# Patient Record
Sex: FEMALE | Race: BLACK OR AFRICAN AMERICAN | NOT HISPANIC OR LATINO | Employment: OTHER | URBAN - METROPOLITAN AREA
[De-identification: names, ages, dates, MRNs, and addresses within clinical notes are randomized per-mention and may not be internally consistent; named-entity substitution may affect disease eponyms.]

---

## 2017-01-04 ENCOUNTER — HOSPITAL ENCOUNTER (OUTPATIENT)
Dept: CARDIOLOGY | Facility: HOSPITAL | Age: 82
Setting detail: RECURRING SERIES
Discharge: HOME | End: 2017-01-04

## 2017-01-04 PROCEDURE — 85610 PROTHROMBIN TIME: CPT | Performed by: INTERNAL MEDICINE

## 2017-01-04 PROCEDURE — 36416 COLLJ CAPILLARY BLOOD SPEC: CPT | Performed by: INTERNAL MEDICINE

## 2017-01-12 ENCOUNTER — HOSPITAL ENCOUNTER (OUTPATIENT)
Dept: CARDIOLOGY | Facility: HOSPITAL | Age: 82
Setting detail: RECURRING SERIES
Discharge: HOME | End: 2017-01-12

## 2017-01-12 PROCEDURE — 85610 PROTHROMBIN TIME: CPT | Performed by: INTERNAL MEDICINE

## 2017-01-12 PROCEDURE — 36416 COLLJ CAPILLARY BLOOD SPEC: CPT | Performed by: INTERNAL MEDICINE

## 2017-01-17 ENCOUNTER — HOSPITAL ENCOUNTER (OUTPATIENT)
Dept: MAMMOGRAPHY | Facility: HOSPITAL | Age: 82
Discharge: HOME | End: 2017-01-17
Admitting: FAMILY MEDICINE

## 2017-01-17 DIAGNOSIS — Z13.9 SCREENING: ICD-10-CM

## 2017-01-17 PROCEDURE — G0202 SCR MAMMO BI INCL CAD: HCPCS

## 2017-01-26 ENCOUNTER — HOSPITAL ENCOUNTER (OUTPATIENT)
Dept: CARDIOLOGY | Facility: HOSPITAL | Age: 82
Setting detail: RECURRING SERIES
Discharge: HOME | End: 2017-01-26

## 2017-01-26 PROCEDURE — 36416 COLLJ CAPILLARY BLOOD SPEC: CPT | Performed by: INTERNAL MEDICINE

## 2017-01-26 PROCEDURE — 85610 PROTHROMBIN TIME: CPT | Performed by: INTERNAL MEDICINE

## 2017-02-23 ENCOUNTER — HOSPITAL ENCOUNTER (OUTPATIENT)
Dept: CARDIOLOGY | Facility: HOSPITAL | Age: 82
Setting detail: RECURRING SERIES
Discharge: HOME | End: 2017-02-23

## 2017-02-23 PROCEDURE — 85610 PROTHROMBIN TIME: CPT

## 2017-02-23 PROCEDURE — 36416 COLLJ CAPILLARY BLOOD SPEC: CPT

## 2017-03-06 ENCOUNTER — TELEPHONE (OUTPATIENT)
Dept: FAMILY MEDICINE CLINIC | Facility: CLINIC | Age: 82
End: 2017-03-06

## 2017-03-08 ENCOUNTER — LAB (OUTPATIENT)
Dept: LAB | Facility: HOSPITAL | Age: 82
End: 2017-03-08

## 2017-03-08 DIAGNOSIS — D72.819 LEUKOPENIA: ICD-10-CM

## 2017-03-08 DIAGNOSIS — D47.2 MGUS (MONOCLONAL GAMMOPATHY OF UNKNOWN SIGNIFICANCE): ICD-10-CM

## 2017-03-08 LAB
ALBUMIN SERPL-MCNC: 3.9 G/DL (ref 3.5–5.2)
ALBUMIN/GLOB SERPL: 1.1 G/DL (ref 1.1–2.4)
ALP SERPL-CCNC: 64 U/L (ref 38–116)
ALT SERPL W P-5'-P-CCNC: <5 U/L (ref 0–33)
ANION GAP SERPL CALCULATED.3IONS-SCNC: 10 MMOL/L
AST SERPL-CCNC: 16 U/L (ref 0–32)
BASOPHILS # BLD AUTO: 0.02 10*3/MM3 (ref 0–0.1)
BASOPHILS NFR BLD AUTO: 0.9 % (ref 0–1.1)
BILIRUB SERPL-MCNC: 1.6 MG/DL (ref 0.1–1.2)
BUN BLD-MCNC: 14 MG/DL (ref 6–20)
BUN/CREAT SERPL: 16.9 (ref 7.3–30)
CALCIUM SPEC-SCNC: 10 MG/DL (ref 8.5–10.2)
CHLORIDE SERPL-SCNC: 103 MMOL/L (ref 98–107)
CO2 SERPL-SCNC: 29 MMOL/L (ref 22–29)
CREAT BLD-MCNC: 0.83 MG/DL (ref 0.6–1.1)
DEPRECATED RDW RBC AUTO: 51.7 FL (ref 37–49)
EOSINOPHIL # BLD AUTO: 0 10*3/MM3 (ref 0–0.36)
EOSINOPHIL NFR BLD AUTO: 0 % (ref 1–5)
ERYTHROCYTE [DISTWIDTH] IN BLOOD BY AUTOMATED COUNT: 14.6 % (ref 11.7–14.5)
GFR SERPL CREATININE-BSD FRML MDRD: 80 ML/MIN/1.73
GLOBULIN UR ELPH-MCNC: 3.5 GM/DL (ref 1.8–3.5)
GLUCOSE BLD-MCNC: 95 MG/DL (ref 74–124)
HCT VFR BLD AUTO: 36.9 % (ref 34–45)
HGB BLD-MCNC: 11.8 G/DL (ref 11.5–14.9)
IMM GRANULOCYTES # BLD: 0 10*3/MM3 (ref 0–0.03)
IMM GRANULOCYTES NFR BLD: 0 % (ref 0–0.5)
LYMPHOCYTES # BLD AUTO: 1.23 10*3/MM3 (ref 1–3.5)
LYMPHOCYTES NFR BLD AUTO: 54.9 % (ref 20–49)
MCH RBC QN AUTO: 30.9 PG (ref 27–33)
MCHC RBC AUTO-ENTMCNC: 32 G/DL (ref 32–35)
MCV RBC AUTO: 96.6 FL (ref 83–97)
MONOCYTES # BLD AUTO: 0.21 10*3/MM3 (ref 0.25–0.8)
MONOCYTES NFR BLD AUTO: 9.4 % (ref 4–12)
NEUTROPHILS # BLD AUTO: 0.78 10*3/MM3 (ref 1.5–7)
NEUTROPHILS NFR BLD AUTO: 34.8 % (ref 39–75)
NRBC BLD MANUAL-RTO: 0 /100 WBC (ref 0–0)
PLATELET # BLD AUTO: 158 10*3/MM3 (ref 150–375)
PMV BLD AUTO: 9.2 FL (ref 8.9–12.1)
POTASSIUM BLD-SCNC: 3.7 MMOL/L (ref 3.5–4.7)
PROT SERPL-MCNC: 7.4 G/DL (ref 6.3–8)
RBC # BLD AUTO: 3.82 10*6/MM3 (ref 3.9–5)
SODIUM BLD-SCNC: 142 MMOL/L (ref 134–145)
WBC NRBC COR # BLD: 2.24 10*3/MM3 (ref 4–10)

## 2017-03-08 PROCEDURE — 80053 COMPREHEN METABOLIC PANEL: CPT | Performed by: INTERNAL MEDICINE

## 2017-03-08 PROCEDURE — 85025 COMPLETE CBC W/AUTO DIFF WBC: CPT | Performed by: INTERNAL MEDICINE

## 2017-03-08 PROCEDURE — 36415 COLL VENOUS BLD VENIPUNCTURE: CPT | Performed by: INTERNAL MEDICINE

## 2017-03-10 LAB
ALBUMIN SERPL-MCNC: 3.4 G/DL (ref 2.9–4.4)
ALBUMIN/GLOB SERPL: 0.9 {RATIO} (ref 0.7–1.7)
ALPHA1 GLOB FLD ELPH-MCNC: 0.3 G/DL (ref 0–0.4)
ALPHA2 GLOB SERPL ELPH-MCNC: 0.7 G/DL (ref 0.4–1)
B-GLOBULIN SERPL ELPH-MCNC: 1.5 G/DL (ref 0.7–1.3)
GAMMA GLOB SERPL ELPH-MCNC: 1.5 G/DL (ref 0.4–1.8)
GLOBULIN SER CALC-MCNC: 3.9 G/DL (ref 2.2–3.9)
IGA SERPL-MCNC: 719 MG/DL (ref 64–422)
IGG SERPL-MCNC: 1549 MG/DL (ref 700–1600)
IGM SERPL-MCNC: 63 MG/DL (ref 26–217)
INTERPRETATION SERPL IEP-IMP: ABNORMAL
KAPPA LC SERPL-MCNC: 42.85 MG/L (ref 3.3–19.4)
KAPPA LC/LAMBDA SER: 0.94 {RATIO} (ref 0.26–1.65)
LAMBDA LC FREE SERPL-MCNC: 45.77 MG/L (ref 5.71–26.3)
Lab: ABNORMAL
M-SPIKE: ABNORMAL G/DL
PROT SERPL-MCNC: 7.3 G/DL (ref 6–8.5)

## 2017-03-22 ENCOUNTER — OFFICE VISIT (OUTPATIENT)
Dept: ONCOLOGY | Facility: CLINIC | Age: 82
End: 2017-03-22

## 2017-03-22 ENCOUNTER — APPOINTMENT (OUTPATIENT)
Dept: LAB | Facility: HOSPITAL | Age: 82
End: 2017-03-22

## 2017-03-22 ENCOUNTER — HOSPITAL ENCOUNTER (OUTPATIENT)
Dept: CARDIOLOGY | Facility: HOSPITAL | Age: 82
Setting detail: RECURRING SERIES
Discharge: HOME | End: 2017-03-22

## 2017-03-22 VITALS
OXYGEN SATURATION: 99 % | BODY MASS INDEX: 36.49 KG/M2 | TEMPERATURE: 97.8 F | DIASTOLIC BLOOD PRESSURE: 94 MMHG | RESPIRATION RATE: 20 BRPM | HEART RATE: 63 BPM | HEIGHT: 68 IN | SYSTOLIC BLOOD PRESSURE: 146 MMHG | WEIGHT: 240.8 LBS

## 2017-03-22 DIAGNOSIS — D47.2 MGUS (MONOCLONAL GAMMOPATHY OF UNKNOWN SIGNIFICANCE): Primary | ICD-10-CM

## 2017-03-22 DIAGNOSIS — D70.8 OTHER NEUTROPENIA (HCC): ICD-10-CM

## 2017-03-22 PROCEDURE — 85610 PROTHROMBIN TIME: CPT

## 2017-03-22 PROCEDURE — 99213 OFFICE O/P EST LOW 20 MIN: CPT | Performed by: INTERNAL MEDICINE

## 2017-03-22 PROCEDURE — 36416 COLLJ CAPILLARY BLOOD SPEC: CPT

## 2017-03-22 NOTE — PROGRESS NOTES
Subjective     REASON FOR FOLLOW UP:   1. CHRONIC LEUKOPENIA/ Neutropenia  2. IgA Lambda MGUS  3. Ulcerative Colitis  4. Positive ALEC    HISTORY OF PRESENT ILLNESS:  The patient is a 81 y.o. year old female who is here for follow up of the above noted  issuse.  She had actually been evaluated by Dr. Choi in our practice in 2014 for leukopenia.  At that time Dr. Choi had sent a peripheral blood flow cytometry which did not show any abnormal populations of white cells.  Ms. Villeda reports that she is actually feeling well in general.  She's not had any recurring infections.  Her white count remains low but stable.  Her platelets and hemoglobin are unremarkable.    With her visit here 6 months ago, we performed additional lab studies that revealed an IgA lambda monoclonal gammopathy.  She had repeat serum protein studies performed last week on 3/8/2017 that showed her IgA level and monoclonal spike are stable.    She also was noted to have a positive ALEC and positive RNP antibodies.    History of Present Illness       Current Outpatient Prescriptions on File Prior to Visit   Medication Sig Dispense Refill   • Acetaminophen (PAIN RELIEVER PO) Take  by mouth.     • calcium carbonate (OS-ALIDA) 600 MG tablet Take  by mouth daily.     • furosemide (LASIX) 40 MG tablet TAKE 1 TABLET EVERY DAY AS DIRECTED 90 tablet 3   • gabapentin (NEURONTIN) 300 MG capsule TAKE 1 CAPSULE THREE TIMES DAILY 270 capsule 3   • mesalamine (LIALDA) 1.2 G EC tablet Take 4 tablets by mouth Daily for 360 days. 360 tablet 3   • omeprazole (priLOSEC) 20 MG capsule Take 1 capsule by mouth Daily. 90 capsule 3   • vitamin B-12 (CYANOCOBALAMIN) 1000 MCG tablet Take  by mouth daily.     • warfarin (COUMADIN) 5 MG tablet Take 1 tablet by mouth daily. 104 tablet 3     No current facility-administered medications on file prior to visit.         ALLERGIES:    Allergies   Allergen Reactions   • Amitriptyline    • Amoxicillin-Pot Clavulanate    • Aspirin   "  • Bactrim [Sulfamethoxazole-Trimethoprim]    • Iodinated Diagnostic Agents    • Latex    • Naproxen    • Nsaids    • Soma Compound With Codeine [Carisoprodol-Aspirin-Codeine]    • Sulfa Antibiotics           Review of Systems   Constitutional: Negative for activity change, appetite change, fatigue, fever and unexpected weight change.   HENT: Negative for hearing loss, nosebleeds, trouble swallowing and voice change.    Eyes: Negative for visual disturbance.   Respiratory: Negative for cough, shortness of breath and wheezing.    Cardiovascular: Negative for chest pain and palpitations.   Gastrointestinal: Negative for abdominal pain, nausea and vomiting.   Genitourinary: Negative for difficulty urinating, frequency, hematuria and urgency.   Musculoskeletal: Positive for arthralgias and gait problem. Negative for back pain and neck pain.   Skin: Negative for rash.   Neurological: Negative for dizziness, seizures, syncope and headaches.        Balance issues.   Hematological: Negative for adenopathy. Does not bruise/bleed easily.   Psychiatric/Behavioral: Negative for behavioral problems. The patient is not nervous/anxious.       Objective     Vitals:    03/22/17 1054   BP: 146/94   Resp: 20   Temp: 97.8 °F (36.6 °C)   TempSrc: Oral   Weight: 240 lb 12.8 oz (109 kg)   Height: 68\" (172.7 cm)   PainSc: 8  Comment: Bilat knee pain x8     Current Status 9/12/2016   ECOG score 1       Physical Exam   Constitutional: She is oriented to person, place, and time. She appears well-developed and well-nourished. No distress.   HENT:   Head: Normocephalic.   Eyes: Conjunctivae and EOM are normal. Pupils are equal, round, and reactive to light. No scleral icterus.   Neck: Normal range of motion. Neck supple. No JVD present. No thyromegaly present.   Cardiovascular: Normal rate and regular rhythm.  Exam reveals no gallop and no friction rub.    Murmur heard.  Pulmonary/Chest: Effort normal and breath sounds normal. She has no " wheezes. She has no rales.   Abdominal: Soft. She exhibits no distension and no mass. There is no tenderness.   Musculoskeletal: Normal range of motion. She exhibits no edema or deformity.   Lymphadenopathy:     She has no cervical adenopathy.   Neurological: She is alert and oriented to person, place, and time. She has normal reflexes. No cranial nerve deficit.   Ambulates with a cane.   Skin: Skin is warm and dry. No rash noted. No erythema.   Psychiatric: She has a normal mood and affect. Her behavior is normal. Judgment normal.         RECENT LABS:  Hematology WBC   Date Value Ref Range Status   03/08/2017 2.24 (L) 4.00 - 10.00 10*3/mm3 Final     RBC   Date Value Ref Range Status   03/08/2017 3.82 (L) 3.90 - 5.00 10*6/mm3 Final     Hemoglobin   Date Value Ref Range Status   03/08/2017 11.8 11.5 - 14.9 g/dL Final     Hematocrit   Date Value Ref Range Status   03/08/2017 36.9 34.0 - 45.0 % Final     Platelets   Date Value Ref Range Status   03/08/2017 158 150 - 375 10*3/mm3 Final          Assessment/Plan   1.  Chronic leukopenia/neutropenia without any recurring infections.  As noted above she been evaluated in our office in 2014 and at that time peripheral blood flow cytometry was normal.  I suspect this may be a combination of benign constitutional leukopenia, which is common among the  population with some contribution or worsening of leukopenia due to her Lialda for ulcerative colitis.  Her counts remain stable.  2.  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  Her IgA level and lambda light chains have been stable over the past 6 months.  We will continue to monitor this.  She currently does not have any signs of underlying myeloma or non-Hodgkin's lymphoma.  3.  Ulcerative colitis.  She continues to follow-up with her gastroenterologist Dr. English  4.  Positive ALEC on previous lab work.  It is possible she could also have some degree of autoimmune neutropenia.    Plan    1.  We  discussed the results of the lab studies from 3/8/2017 with Mrs. Villeda.  Since her MGUS and leukopenia are stable we will continue to observe this for now.  2.  She'll be scheduled to return in 6 months for M.D. follow-up and again will undergo labs one week prior to that visit including a CBC, serum chemistries, and serum protein studies.  3.  She is still reluctant to see a rheumatologist regarding her positive ALEC due to the high co-pay associated with specialty visits.  We did discuss however that if her arthralgias worsen that she may want to see a rheumatologist at that time.  We also discussed the possibility of a trial of prednisone therapy in the future if her symptoms worsen or if her counts worsen significantly over time.

## 2017-04-21 ENCOUNTER — HOSPITAL ENCOUNTER (OUTPATIENT)
Dept: CARDIOLOGY | Facility: HOSPITAL | Age: 82
Setting detail: RECURRING SERIES
Discharge: HOME | End: 2017-04-21

## 2017-04-21 PROCEDURE — 36416 COLLJ CAPILLARY BLOOD SPEC: CPT

## 2017-04-21 PROCEDURE — 85610 PROTHROMBIN TIME: CPT

## 2017-05-02 ENCOUNTER — OFFICE VISIT (OUTPATIENT)
Dept: FAMILY MEDICINE CLINIC | Facility: CLINIC | Age: 82
End: 2017-05-02

## 2017-05-02 VITALS
HEIGHT: 68 IN | OXYGEN SATURATION: 96 % | BODY MASS INDEX: 36.19 KG/M2 | HEART RATE: 81 BPM | SYSTOLIC BLOOD PRESSURE: 130 MMHG | DIASTOLIC BLOOD PRESSURE: 70 MMHG | WEIGHT: 238.8 LBS | TEMPERATURE: 98 F

## 2017-05-02 DIAGNOSIS — Z23 ENCOUNTER FOR IMMUNIZATION: ICD-10-CM

## 2017-05-02 DIAGNOSIS — Z00.00 HEALTH CARE MAINTENANCE: Primary | ICD-10-CM

## 2017-05-02 PROCEDURE — 90670 PCV13 VACCINE IM: CPT | Performed by: FAMILY MEDICINE

## 2017-05-02 PROCEDURE — G0009 ADMIN PNEUMOCOCCAL VACCINE: HCPCS | Performed by: FAMILY MEDICINE

## 2017-05-02 PROCEDURE — G0439 PPPS, SUBSEQ VISIT: HCPCS | Performed by: FAMILY MEDICINE

## 2017-05-02 RX ORDER — FUROSEMIDE 20 MG/1
TABLET ORAL
Qty: 135 TABLET | Refills: 3 | Status: SHIPPED | OUTPATIENT
Start: 2017-05-02 | End: 2017-08-31 | Stop reason: SDUPTHER

## 2017-05-02 RX ORDER — FUROSEMIDE 20 MG/1
20 TABLET ORAL DAILY
COMMUNITY
End: 2017-05-02 | Stop reason: SDUPTHER

## 2017-05-05 ENCOUNTER — TELEPHONE (OUTPATIENT)
Dept: FAMILY MEDICINE CLINIC | Facility: CLINIC | Age: 82
End: 2017-05-05

## 2017-05-16 ENCOUNTER — HOSPITAL ENCOUNTER (OUTPATIENT)
Dept: CARDIOLOGY | Facility: HOSPITAL | Age: 82
Setting detail: RECURRING SERIES
Discharge: HOME | End: 2017-05-16

## 2017-05-16 PROCEDURE — 85610 PROTHROMBIN TIME: CPT

## 2017-05-16 PROCEDURE — 36416 COLLJ CAPILLARY BLOOD SPEC: CPT

## 2017-05-22 ENCOUNTER — TELEPHONE (OUTPATIENT)
Dept: FAMILY MEDICINE CLINIC | Facility: CLINIC | Age: 82
End: 2017-05-22

## 2017-06-12 ENCOUNTER — TELEPHONE (OUTPATIENT)
Dept: GASTROENTEROLOGY | Facility: CLINIC | Age: 82
End: 2017-06-12

## 2017-06-12 RX ORDER — MESALAMINE 1.2 G/1
TABLET, DELAYED RELEASE ORAL
Qty: 360 TABLET | Refills: 1 | Status: SHIPPED | OUTPATIENT
Start: 2017-06-12 | End: 2017-11-01 | Stop reason: SDUPTHER

## 2017-06-12 NOTE — TELEPHONE ENCOUNTER
----- Message from Samantha Gonzalez sent at 6/12/2017  1:13 PM EDT -----  Regarding: PT CALLED - mesalamine (LIALDA) 1.2 G EC tablet REFILL  Contact: [phone]  DANIAL VELARDE SEND TO [facility]. PT ALMOST OUT OF MEDS.

## 2017-06-13 RX ORDER — FUROSEMIDE 40 MG/1
TABLET ORAL
Qty: 90 TABLET | Refills: 3 | Status: SHIPPED | OUTPATIENT
Start: 2017-06-13 | End: 2017-08-31

## 2017-06-14 ENCOUNTER — HOSPITAL ENCOUNTER (OUTPATIENT)
Dept: CARDIOLOGY | Facility: HOSPITAL | Age: 82
Setting detail: RECURRING SERIES
Discharge: HOME | End: 2017-06-14

## 2017-06-14 PROCEDURE — 85610 PROTHROMBIN TIME: CPT

## 2017-06-14 PROCEDURE — 36416 COLLJ CAPILLARY BLOOD SPEC: CPT

## 2017-07-05 ENCOUNTER — TELEPHONE (OUTPATIENT)
Dept: FAMILY MEDICINE CLINIC | Facility: CLINIC | Age: 82
End: 2017-07-05

## 2017-07-05 NOTE — TELEPHONE ENCOUNTER
Pt called wanting to know when her last bone density test was? Wanted to know if she is do for it? Last dexa scan that i see in allscripts is 12/18/2013

## 2017-07-06 DIAGNOSIS — Z78.0 POSTMENOPAUSAL: Primary | ICD-10-CM

## 2017-07-06 NOTE — TELEPHONE ENCOUNTER
Informed pt. Pt said that can we schedule that for her. Wants it done at [facility].

## 2017-07-12 ENCOUNTER — HOSPITAL ENCOUNTER (OUTPATIENT)
Dept: CARDIOLOGY | Facility: HOSPITAL | Age: 82
Setting detail: RECURRING SERIES
Discharge: HOME | End: 2017-07-12

## 2017-07-12 PROCEDURE — 85610 PROTHROMBIN TIME: CPT

## 2017-07-12 PROCEDURE — 36416 COLLJ CAPILLARY BLOOD SPEC: CPT

## 2017-07-18 ENCOUNTER — HOSPITAL ENCOUNTER (OUTPATIENT)
Dept: BONE DENSITY | Facility: HOSPITAL | Age: 82
Discharge: HOME | End: 2017-07-18
Admitting: FAMILY MEDICINE

## 2017-07-18 DIAGNOSIS — Z78.0 POSTMENOPAUSAL: ICD-10-CM

## 2017-07-18 PROCEDURE — 77080 DXA BONE DENSITY AXIAL: CPT

## 2017-07-20 ENCOUNTER — TELEPHONE (OUTPATIENT)
Dept: FAMILY MEDICINE CLINIC | Facility: CLINIC | Age: 82
End: 2017-07-20

## 2017-07-20 NOTE — TELEPHONE ENCOUNTER
----- Message from Praveena Jeff MA sent at 7/19/2017  7:39 AM EDT -----  Please keep for .    Bone density study shows no change from the last one which is good.  She has osteopenia which is thinning of the bones but not osteoporosis.  Should take calcium and vitamin D as well as do weight bearing exercise such as walking    Recheck in 2 years

## 2017-08-04 ENCOUNTER — TELEPHONE (OUTPATIENT)
Dept: FAMILY MEDICINE CLINIC | Facility: CLINIC | Age: 82
End: 2017-08-04

## 2017-08-04 NOTE — TELEPHONE ENCOUNTER
That is not something we can take care of over the phone.  Need a whole lot more information  Is she short of breath?  Wheezing? Having chest pain or palpitations?  Does the swelling go down at all overnight?  Has she tried compression hose?  Does the diuretic help even for a short time?

## 2017-08-04 NOTE — TELEPHONE ENCOUNTER
Not short of breath. No wheezing, no chest pains or palpitations, the swelling goes down a little at night. She does do the compression hose but the swelling is so bad its to hard to put them on. She said that it helps a little when she first takes it but then stops.

## 2017-08-04 NOTE — TELEPHONE ENCOUNTER
Pt said that she is having severe swelling in both legs from the knees down. She has been keeping them elevated and taking her furosemide like she is supposed to. She said that its not hot to the touch or red. She said that the swelling is really bad and wasn't sure if she should adjust her medication or what she should do?

## 2017-08-07 DIAGNOSIS — R60.0 BILATERAL EDEMA OF LOWER EXTREMITY: Primary | ICD-10-CM

## 2017-08-09 ENCOUNTER — HOSPITAL ENCOUNTER (OUTPATIENT)
Dept: CARDIOLOGY | Facility: HOSPITAL | Age: 82
Setting detail: RECURRING SERIES
Discharge: HOME | End: 2017-08-09

## 2017-08-09 PROCEDURE — 36416 COLLJ CAPILLARY BLOOD SPEC: CPT

## 2017-08-09 PROCEDURE — 85610 PROTHROMBIN TIME: CPT

## 2017-08-16 RX ORDER — OMEPRAZOLE 20 MG/1
CAPSULE, DELAYED RELEASE ORAL
Qty: 90 CAPSULE | Refills: 3 | OUTPATIENT
Start: 2017-08-16

## 2017-08-16 RX ORDER — GABAPENTIN 300 MG/1
300 CAPSULE ORAL 3 TIMES DAILY
Qty: 270 CAPSULE | Refills: 0 | Status: SHIPPED | OUTPATIENT
Start: 2017-08-16 | End: 2017-10-18 | Stop reason: SDUPTHER

## 2017-08-16 NOTE — TELEPHONE ENCOUNTER
Last seen 05/02/17  Last refill 07/21/16 90 day supply    Wants sent to mail order so needs to be printed out.

## 2017-08-16 NOTE — TELEPHONE ENCOUNTER
Escribe request received from [employer] for Omeprazole 20 mg 1 tab po daily.  Prescribed on 11/28/16, #90, R3.  Call to [employer] @ [phone] and spoke with Annie to advise of above.  She states all refills have been sent 2nd shipping protocol. - last shipped 6/2017    Call to pt. She states has enough rx on hand to last until November.  No refill needed.  Escribe request denied.    Shoaib nath scheduled with DR English for 11/30/17 @ 7905.

## 2017-08-30 ENCOUNTER — LAB (OUTPATIENT)
Dept: LAB | Facility: HOSPITAL | Age: 82
End: 2017-08-30

## 2017-08-30 DIAGNOSIS — D47.2 MGUS (MONOCLONAL GAMMOPATHY OF UNKNOWN SIGNIFICANCE): ICD-10-CM

## 2017-08-30 DIAGNOSIS — D70.8 OTHER NEUTROPENIA (HCC): ICD-10-CM

## 2017-08-30 LAB
ALBUMIN SERPL-MCNC: 3.9 G/DL (ref 3.5–5.2)
ALBUMIN/GLOB SERPL: 1 G/DL (ref 1.1–2.4)
ALP SERPL-CCNC: 70 U/L (ref 38–116)
ALT SERPL W P-5'-P-CCNC: 9 U/L (ref 0–33)
ANION GAP SERPL CALCULATED.3IONS-SCNC: 11.5 MMOL/L
AST SERPL-CCNC: 18 U/L (ref 0–32)
BASOPHILS # BLD AUTO: 0.02 10*3/MM3 (ref 0–0.1)
BASOPHILS NFR BLD AUTO: 0.7 % (ref 0–1.1)
BILIRUB SERPL-MCNC: 0.9 MG/DL (ref 0.1–1.2)
BUN BLD-MCNC: 16 MG/DL (ref 6–20)
BUN/CREAT SERPL: 18.2 (ref 7.3–30)
CALCIUM SPEC-SCNC: 10.4 MG/DL (ref 8.5–10.2)
CHLORIDE SERPL-SCNC: 103 MMOL/L (ref 98–107)
CO2 SERPL-SCNC: 27.5 MMOL/L (ref 22–29)
CREAT BLD-MCNC: 0.88 MG/DL (ref 0.6–1.1)
DEPRECATED RDW RBC AUTO: 53.7 FL (ref 37–49)
EOSINOPHIL # BLD AUTO: 0 10*3/MM3 (ref 0–0.36)
EOSINOPHIL NFR BLD AUTO: 0 % (ref 1–5)
ERYTHROCYTE [DISTWIDTH] IN BLOOD BY AUTOMATED COUNT: 14.7 % (ref 11.7–14.5)
GFR SERPL CREATININE-BSD FRML MDRD: 75 ML/MIN/1.73
GLOBULIN UR ELPH-MCNC: 3.8 GM/DL (ref 1.8–3.5)
GLUCOSE BLD-MCNC: 96 MG/DL (ref 74–124)
HCT VFR BLD AUTO: 38.2 % (ref 34–45)
HGB BLD-MCNC: 12.3 G/DL (ref 11.5–14.9)
IMM GRANULOCYTES # BLD: 0.01 10*3/MM3 (ref 0–0.03)
IMM GRANULOCYTES NFR BLD: 0.3 % (ref 0–0.5)
LYMPHOCYTES # BLD AUTO: 1.8 10*3/MM3 (ref 1–3.5)
LYMPHOCYTES NFR BLD AUTO: 61.2 % (ref 20–49)
MCH RBC QN AUTO: 31.8 PG (ref 27–33)
MCHC RBC AUTO-ENTMCNC: 32.2 G/DL (ref 32–35)
MCV RBC AUTO: 98.7 FL (ref 83–97)
MONOCYTES # BLD AUTO: 0.29 10*3/MM3 (ref 0.25–0.8)
MONOCYTES NFR BLD AUTO: 9.9 % (ref 4–12)
NEUTROPHILS # BLD AUTO: 0.82 10*3/MM3 (ref 1.5–7)
NEUTROPHILS NFR BLD AUTO: 27.9 % (ref 39–75)
NRBC BLD MANUAL-RTO: 0 /100 WBC (ref 0–0)
PLATELET # BLD AUTO: 167 10*3/MM3 (ref 150–375)
PMV BLD AUTO: 8.9 FL (ref 8.9–12.1)
POTASSIUM BLD-SCNC: 4 MMOL/L (ref 3.5–4.7)
PROT SERPL-MCNC: 7.7 G/DL (ref 6.3–8)
RBC # BLD AUTO: 3.87 10*6/MM3 (ref 3.9–5)
SODIUM BLD-SCNC: 142 MMOL/L (ref 134–145)
VIT B12 BLD-MCNC: 1174 PG/ML (ref 211–946)
WBC NRBC COR # BLD: 2.94 10*3/MM3 (ref 4–10)

## 2017-08-30 PROCEDURE — 80053 COMPREHEN METABOLIC PANEL: CPT | Performed by: INTERNAL MEDICINE

## 2017-08-30 PROCEDURE — 85025 COMPLETE CBC W/AUTO DIFF WBC: CPT | Performed by: INTERNAL MEDICINE

## 2017-08-30 PROCEDURE — 36415 COLL VENOUS BLD VENIPUNCTURE: CPT | Performed by: INTERNAL MEDICINE

## 2017-08-30 PROCEDURE — 82607 VITAMIN B-12: CPT | Performed by: INTERNAL MEDICINE

## 2017-08-31 RX ORDER — FUROSEMIDE 20 MG/1
TABLET ORAL
Qty: 270 TABLET | Refills: 3 | Status: SHIPPED | OUTPATIENT
Start: 2017-08-31 | End: 2018-01-02 | Stop reason: SDUPTHER

## 2017-08-31 RX ORDER — WARFARIN SODIUM 5 MG/1
5 TABLET ORAL
Qty: 104 TABLET | Refills: 3 | Status: SHIPPED | OUTPATIENT
Start: 2017-08-31 | End: 2018-06-26 | Stop reason: SDUPTHER

## 2017-09-01 LAB
ALBUMIN SERPL-MCNC: 3.4 G/DL (ref 2.9–4.4)
ALBUMIN/GLOB SERPL: 0.9 {RATIO} (ref 0.7–1.7)
ALPHA1 GLOB FLD ELPH-MCNC: 0.3 G/DL (ref 0–0.4)
ALPHA2 GLOB SERPL ELPH-MCNC: 0.7 G/DL (ref 0.4–1)
B-GLOBULIN SERPL ELPH-MCNC: 1.6 G/DL (ref 0.7–1.3)
GAMMA GLOB SERPL ELPH-MCNC: 1.5 G/DL (ref 0.4–1.8)
GLOBULIN SER CALC-MCNC: 4.1 G/DL (ref 2.2–3.9)
IGA SERPL-MCNC: 788 MG/DL (ref 64–422)
IGG SERPL-MCNC: 1331 MG/DL (ref 700–1600)
IGM SERPL-MCNC: 67 MG/DL (ref 26–217)
INTERPRETATION SERPL IEP-IMP: ABNORMAL
KAPPA LC SERPL-MCNC: 42.6 MG/L (ref 3.3–19.4)
KAPPA LC/LAMBDA SER: 0.85 {RATIO} (ref 0.26–1.65)
LAMBDA LC FREE SERPL-MCNC: 50.3 MG/L (ref 5.7–26.3)
Lab: ABNORMAL
M-SPIKE: ABNORMAL G/DL
PROT SERPL-MCNC: 7.5 G/DL (ref 6–8.5)

## 2017-09-06 ENCOUNTER — HOSPITAL ENCOUNTER (OUTPATIENT)
Dept: CARDIOLOGY | Facility: HOSPITAL | Age: 82
Setting detail: RECURRING SERIES
Discharge: HOME | End: 2017-09-06

## 2017-09-06 ENCOUNTER — APPOINTMENT (OUTPATIENT)
Dept: LAB | Facility: HOSPITAL | Age: 82
End: 2017-09-06

## 2017-09-06 ENCOUNTER — OFFICE VISIT (OUTPATIENT)
Dept: ONCOLOGY | Facility: CLINIC | Age: 82
End: 2017-09-06

## 2017-09-06 VITALS
BODY MASS INDEX: 40.55 KG/M2 | HEART RATE: 68 BPM | OXYGEN SATURATION: 98 % | SYSTOLIC BLOOD PRESSURE: 132 MMHG | DIASTOLIC BLOOD PRESSURE: 76 MMHG | WEIGHT: 243.4 LBS | TEMPERATURE: 97.5 F | RESPIRATION RATE: 18 BRPM | HEIGHT: 65 IN

## 2017-09-06 DIAGNOSIS — D47.2 MGUS (MONOCLONAL GAMMOPATHY OF UNKNOWN SIGNIFICANCE): Primary | ICD-10-CM

## 2017-09-06 DIAGNOSIS — D70.8 OTHER NEUTROPENIA (HCC): ICD-10-CM

## 2017-09-06 PROCEDURE — 36416 COLLJ CAPILLARY BLOOD SPEC: CPT

## 2017-09-06 PROCEDURE — 85610 PROTHROMBIN TIME: CPT

## 2017-09-06 PROCEDURE — 99213 OFFICE O/P EST LOW 20 MIN: CPT | Performed by: INTERNAL MEDICINE

## 2017-09-06 RX ORDER — MOMETASONE FUROATE 1 MG/G
1 OINTMENT TOPICAL 2 TIMES DAILY PRN
COMMUNITY
Start: 2017-08-24 | End: 2019-08-19

## 2017-09-06 RX ORDER — MOMETASONE FUROATE 1 MG/G
OINTMENT TOPICAL
COMMUNITY
Start: 2017-08-23 | End: 2017-09-06 | Stop reason: SDUPTHER

## 2017-09-06 NOTE — PROGRESS NOTES
Subjective     REASON FOR FOLLOW UP:   1. CHRONIC LEUKOPENIA/ Neutropenia  2. IgA Lambda MGUS  3. Ulcerative Colitis  4. Positive ALEC    HISTORY OF PRESENT ILLNESS:  The patient is a 82 y.o. year old female who is here for follow up of the above noted  issuse.  She had actually been evaluated by Dr. Choi in our practice in 2014 for leukopenia.  At that time Dr. Choi had sent a peripheral blood flow cytometry which did not show any abnormal populations of white cells.  Ms. Villead reports that she is actually feeling well in general.  She's not had any recurring infections.  Her white count remains low but stable.  Her platelets and hemoglobin are unremarkable.    With her visit here 6 months ago, we performed additional lab studies that revealed an IgA lambda monoclonal gammopathy.  She had repeat serum protein studies performed last week on 8/30/2017 that showed her IgA level and monoclonal spike had increased slightly.  Her white blood count remained low with an absolute neutrophil count of 820.  Her serum calcium level was also borderline elevated at 10.4 with a serum creatinine of 0.88.    I discussed these results with the patient and with the patient's daughter by phone.  I think it would be best if we proceed with a bone marrow aspirate and biopsy to be sure that there is no myeloma or lymphoma lurking in the marrow to explain these abnormalities.    She also was noted to have a positive ALEC and positive RNP antibodies.    History of Present Illness       Current Outpatient Prescriptions on File Prior to Visit   Medication Sig Dispense Refill   • Acetaminophen (PAIN RELIEVER PO) Take  by mouth.     • calcium carbonate (OS-ALIDA) 600 MG tablet Take  by mouth daily.     • furosemide (LASIX) 20 MG tablet TAKE 2 TABLETS IN THE MORNING  AND TAKE 1 TABLET AT NIGHT 270 tablet 3   • gabapentin (NEURONTIN) 300 MG capsule Take 1 capsule by mouth 3 (Three) Times a Day. 270 capsule 0   • mesalamine (LIALDA) 1.2 G EC  "tablet Take 4 tablets daily 360 tablet 1   • omeprazole (priLOSEC) 20 MG capsule Take 1 capsule by mouth Daily. 90 capsule 3   • vitamin B-12 (CYANOCOBALAMIN) 1000 MCG tablet Take  by mouth daily.     • warfarin (COUMADIN) 5 MG tablet Take 1 tablet by mouth Daily. 104 tablet 3     No current facility-administered medications on file prior to visit.         ALLERGIES:    Allergies   Allergen Reactions   • Amitriptyline    • Amoxicillin-Pot Clavulanate    • Aspirin    • Bactrim [Sulfamethoxazole-Trimethoprim]    • Iodinated Diagnostic Agents    • Latex    • Naproxen    • Nsaids    • Soma Compound With Codeine [Carisoprodol-Aspirin-Codeine]    • Sulfa Antibiotics           Review of Systems   Constitutional: Negative for activity change, appetite change, fatigue, fever and unexpected weight change.   HENT: Negative for hearing loss, nosebleeds, trouble swallowing and voice change.    Eyes: Negative for visual disturbance.   Respiratory: Negative for cough, shortness of breath and wheezing.    Cardiovascular: Negative for chest pain and palpitations.   Gastrointestinal: Negative for abdominal pain, nausea and vomiting.   Genitourinary: Negative for difficulty urinating, frequency, hematuria and urgency.   Musculoskeletal: Positive for arthralgias and gait problem. Negative for back pain and neck pain.   Skin: Negative for rash.   Neurological: Negative for dizziness, seizures, syncope and headaches.        Balance issues.   Hematological: Negative for adenopathy. Does not bruise/bleed easily.   Psychiatric/Behavioral: Negative for behavioral problems. The patient is not nervous/anxious.       Objective     Vitals:    09/06/17 0849   BP: 132/76   Pulse: 68   Resp: 18   Temp: 97.5 °F (36.4 °C)   TempSrc: Oral   SpO2: 98%   Weight: 243 lb 6.4 oz (110 kg)   Height: 64.96\" (165 cm)  Comment: new ht   PainSc: 5  Comment: all over pain, mainly rt shoulder     Current Status 9/6/2017   ECOG score 1       Physical Exam "   Constitutional: She is oriented to person, place, and time. She appears well-developed and well-nourished. No distress.   HENT:   Head: Normocephalic.   Eyes: Conjunctivae and EOM are normal. Pupils are equal, round, and reactive to light. No scleral icterus.   Neck: Normal range of motion. Neck supple. No JVD present. No thyromegaly present.   Cardiovascular: Normal rate and regular rhythm.  Exam reveals no gallop and no friction rub.    Murmur heard.  Pulmonary/Chest: Effort normal and breath sounds normal. She has no wheezes. She has no rales.   Abdominal: Soft. She exhibits no distension and no mass. There is no tenderness.   Musculoskeletal: Normal range of motion. She exhibits no edema or deformity.   Lymphadenopathy:     She has no cervical adenopathy.   Neurological: She is alert and oriented to person, place, and time. She has normal reflexes. No cranial nerve deficit.   Ambulates with a cane.   Skin: Skin is warm and dry. No rash noted. No erythema.   Psychiatric: She has a normal mood and affect. Her behavior is normal. Judgment normal.         RECENT LABS:  Hematology WBC   Date Value Ref Range Status   08/30/2017 2.94 (L) 4.00 - 10.00 10*3/mm3 Final     RBC   Date Value Ref Range Status   08/30/2017 3.87 (L) 3.90 - 5.00 10*6/mm3 Final     Hemoglobin   Date Value Ref Range Status   08/30/2017 12.3 11.5 - 14.9 g/dL Final     Hematocrit   Date Value Ref Range Status   08/30/2017 38.2 34.0 - 45.0 % Final     Platelets   Date Value Ref Range Status   08/30/2017 167 150 - 375 10*3/mm3 Final        Lab Results   Component Value Date    GLUCOSE 96 08/30/2017    BUN 16 08/30/2017    CREATININE 0.88 08/30/2017    EGFRIFNONA  09/12/2016      Comment:      <15 Indicative of kidney failure.    EGFRIFAFRI 75 08/30/2017    BCR 18.2 08/30/2017    K 4.0 08/30/2017    CO2 27.5 08/30/2017    CALCIUM 10.4 (H) 08/30/2017    PROTENTOTREF 7.5 08/30/2017    ALBUMIN 3.90 08/30/2017    ALBUMIN 3.4 08/30/2017    LABIL2 1.0 (L)  08/30/2017    LABIL2 0.9 08/30/2017    AST 18 08/30/2017    ALT 9 08/30/2017     Lab Results   Component Value Date    UWWSCKEA94 1174 (H) 08/30/2017 8/30/2017  IgG 700 - 1600 mg/dL 1331   IgA 64 - 422 mg/dL 788 (H)   IgM 26 - 217 mg/dL 67   Total Protein 6.0 - 8.5 g/dL 7.5   Albumin 2.9 - 4.4 g/dL 3.4   Alpha-1-Globulin 0.0 - 0.4 g/dL 0.3   Alpha-2-Globulin 0.4 - 1.0 g/dL 0.7   Beta Globulin 0.7 - 1.3 g/dL 1.6 (H)   Gamma Globulin 0.4 - 1.8 g/dL 1.5   M-Kip Not Observed g/dL Comment:   Comments: Due to the small quantity of monoclonal protein, unable to   quantitate the M-spike.   Globulin 2.2 - 3.9 g/dL 4.1 (H)   A/G Ratio 0.7 - 1.7 0.9   Immunofixation Reflex, Serum  Comment   Comments: Immunofixation shows IgA monoclonal protein with lambda light chain      Free Light Chain, Kappa 3.3 - 19.4 mg/L 42.6 (H)   Free Lambda Light Chains 5.7 - 26.3 mg/L 50.3 (H)   Kappa/Lambda Ratio 0.26 - 1.65 0.85       Assessment/Plan   1.  Chronic leukopenia/neutropenia without any recurring infections.  As noted above she been evaluated in our office in 2014 and at that time peripheral blood flow cytometry was normal.  I suspect this may be a combination of benign constitutional leukopenia, which is common among the  population with some contribution or worsening of leukopenia due to her Lialda for ulcerative colitis or possible autoimmune leukopenia.  Her counts remain low but stable.  2.  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  Her IgA level and lambda light chains have increased slightly over the past 6 months.  We discussed the option of a bone marrow aspirate and biopsy to rule out malignancy in the marrow as a cause for these abnormalities.    3.  Ulcerative colitis.  She continues to follow-up with her gastroenterologist Dr. English  4.  Positive ALEC on previous lab work.  It is possible she could also have some degree of autoimmune neutropenia.    Plan    1.  After discussing the  situation with the patient and her daughter, they've agreed to undergo a CT-guided bone marrow biopsy next week.  She will have to interrupt her Coumadin temporarily.  2.  She'll be scheduled to follow-up at our [place] office a couple of weeks after the biopsy to go over the results.  Obviously if there are any significant abnormalities in the marrow we can discuss treatment options.  If the marrow is unremarkable with no evidence of myeloma or other malignancies, then we most likely will get back on a schedule of following these abnormalities with labs every 6 months.

## 2017-09-12 ENCOUNTER — TELEPHONE (OUTPATIENT)
Dept: GASTROENTEROLOGY | Facility: CLINIC | Age: 82
End: 2017-09-12

## 2017-09-12 NOTE — TELEPHONE ENCOUNTER
"Faxed clarification received from [employer] with message: \"rx for Lialda DR 1.2 gm tab.  Pt reports salicylates allergy and there is a potential for cross- sensitivity.  Please confirm if ok to fill this medication.\"    Form to Dr English.    "

## 2017-09-13 ENCOUNTER — HOSPITAL ENCOUNTER (OUTPATIENT)
Dept: CT IMAGING | Facility: HOSPITAL | Age: 82
Discharge: HOME | End: 2017-09-13
Attending: INTERNAL MEDICINE | Admitting: INTERNAL MEDICINE

## 2017-09-13 VITALS
TEMPERATURE: 97.3 F | DIASTOLIC BLOOD PRESSURE: 48 MMHG | HEIGHT: 66 IN | SYSTOLIC BLOOD PRESSURE: 102 MMHG | HEART RATE: 60 BPM | WEIGHT: 240 LBS | BODY MASS INDEX: 38.57 KG/M2 | RESPIRATION RATE: 14 BRPM | OXYGEN SATURATION: 96 %

## 2017-09-13 DIAGNOSIS — D70.8 OTHER NEUTROPENIA (HCC): ICD-10-CM

## 2017-09-13 DIAGNOSIS — D47.2 MGUS (MONOCLONAL GAMMOPATHY OF UNKNOWN SIGNIFICANCE): ICD-10-CM

## 2017-09-13 LAB
INR PPP: 1.4 (ref 0.8–1.2)
PROTHROMBIN TIME: 16.1 SECONDS (ref 12.8–15.2)

## 2017-09-13 PROCEDURE — 88305 TISSUE EXAM BY PATHOLOGIST: CPT | Performed by: INTERNAL MEDICINE

## 2017-09-13 PROCEDURE — 63710000001 ALPRAZOLAM 1 MG TABLET: Performed by: RADIOLOGY

## 2017-09-13 PROCEDURE — 88311 DECALCIFY TISSUE: CPT | Performed by: INTERNAL MEDICINE

## 2017-09-13 PROCEDURE — 77012 CT SCAN FOR NEEDLE BIOPSY: CPT

## 2017-09-13 PROCEDURE — A9270 NON-COVERED ITEM OR SERVICE: HCPCS | Performed by: RADIOLOGY

## 2017-09-13 RX ORDER — ALPRAZOLAM 1 MG/1
1 TABLET ORAL ONCE
Status: COMPLETED | OUTPATIENT
Start: 2017-09-13 | End: 2017-09-13

## 2017-09-13 RX ORDER — LIDOCAINE HYDROCHLORIDE 10 MG/ML
20 INJECTION, SOLUTION INFILTRATION; PERINEURAL ONCE
Status: COMPLETED | OUTPATIENT
Start: 2017-09-13 | End: 2017-09-13

## 2017-09-13 RX ADMIN — ALPRAZOLAM 1 MG: 1 TABLET ORAL at 08:26

## 2017-09-13 RX ADMIN — LIDOCAINE HYDROCHLORIDE 20 ML: 10 INJECTION, SOLUTION INFILTRATION; PERINEURAL at 08:55

## 2017-09-13 NOTE — DISCHARGE INSTRUCTIONS
..EDUCATION /DISCHARGE INSTRUCTIONS  CT/US guided biopsy:  A biopsy is a procedure done to remove tissue for further analysis.  Before images are taken to locate the target area.  Images can be obtained using ultrasound, CT or MRI.  A physician will clean your skin with antiseptic soap, place a sterile towel around the site and administer a local anesthetic to numb the area.  The physician will then insert a special needle.  Sometimes images are taken of the needle after it is inserted to ensure the needle is in the correct area to be biopsied.   A sample is obtained and sent to the laboratory for study.  Occasionally the laboratory is unable to make a diagnosis from the sample and the procedure may need to be repeated.  Within a week the radiologist will send a report to your physician.  A pathologist will also examine the tissue and send a report.      Risks of the procedure include but are not limited to:   *  Bleeding    *  Infection   *  Puncture of surrounding organs *  Death     *  Lung collapse if the biopsy is near the chest which may require insertion of a       tube to re-inflate the lung if severe.      Benefits of the procedure:  Using x-ray helps to locate the area that requires a biopsy. The procedure is less invasive than a surgical procedure, there are no large incisions and it does not require anesthesia.      Alternatives to the procedure:  A biopsy can be performed surgically.  Risks of a surgical biopsy include exposure to anesthesia, infection, excessive bleeding and injury to abdominal organs.  A benefit of surgical biopsy is the ability to see the area to be biopsied and remove of a larger piece of tissue.    THIS EDUCATION INFORMATION WAS REVIEWED PRIOR TO PROCEDURE AND CONSENT. Patient initials__________________Time___________________    Post Procedure:    *  Expect the biopsy site may be tender up to one week.    *  Rest today (no pushing pulling or straining).   *  Slowly increase  activity tomorrow.    *  If you received sedation do not drive for 24 hours.   *  Keep dressing clean and dry.   *  Leave dressing on puncture site for 24 hours.    *  You may shower when dressing removed.    Call your doctor if experiencing:   *  Signs of infection such as redness, swelling, excessive pain and / or foul        smelling drainage from the puncture site.   *  Chills or fever over 101 degrees (by mouth).   *  Unrelieved pain.   *  Any new or severe symptoms.   *  If experiencing sudden / severe shortness of breath or chest pain go to the       nearest emergency room.     Following the procedure:     Follow-up with the ordering physician as directed.    Continue to take other medications as directed by your physician unless    otherwise instructed.   If applicable, resume taking your blood thinners  _9/13/2017 before bed.    If you have any concerns please call the Radiology Nurses Desk at [phone].  You are the most important factor in your recovery.  Follow the above instructions carefully.

## 2017-09-13 NOTE — NURSING NOTE
0810 Dr. Gonzales here.  0828 S/W Ruth, notified her patient will be ready in 20 minutes d/t premed given.

## 2017-09-13 NOTE — INTERVAL H&P NOTE
Name: Brittany Villeda ADMIT: 2017   : 1935  PCP: Janice Castaneda MD    MRN: [id number] LOS: 0 days   AGE/SEX: 82 y.o. female  ROOM: Room/bed info not found       Chief complaint: CHRONIC LEUKOPENIA/ Neutropenia for bone marrow biopsy and aspiration    Present Illness or Internal History:  Patient is a 82 y.o. female presents with CHRONIC LEUKOPENIA/ Neutropenia for bone marrow biopsy and aspiration.     Past Surgical History:  Past Surgical History:   Procedure Laterality Date   • CHOLECYSTECTOMY     • COLONOSCOPY  2014    Showed evident of colitis.   • HYSTERECTOMY     • KNEE ARTHROPLASTY     • MYOMECTOMY     • SINUS SURGERY     • TONSILLECTOMY         Past Medical History:  Past Medical History:   Diagnosis Date   • Anemia    • Arthritis    • Asthma    • Colitis    • COPD (chronic obstructive pulmonary disease)    • Diastolic dysfunction    • GERD (gastroesophageal reflux disease)    • Hypertension    • Hypertensive heart disease    • Hyperthyroidism    • Low back pain    • MGUS (monoclonal gammopathy of unknown significance)    • Nephrolithiasis    • Obesity    • Paroxysmal atrial fibrillation    • Peptic ulceration    • Pulmonary hypertension    • Sleep apnea    • Ventricular tachycardia     nonsustained   • Vertigo        Home Medications:    (Not in a hospital admission)    Allergies:  Amitriptyline; Amoxicillin-pot clavulanate; Aspirin; Bactrim [sulfamethoxazole-trimethoprim]; Iodinated diagnostic agents; Latex; Naproxen; Nsaids; Soma compound with codeine [carisoprodol-aspirin-codeine]; and Sulfa antibiotics    Family History:  Family History   Problem Relation Age of Onset   • Diabetes Mother    • Breast cancer Sister    • Kidney cancer Sister    • Heart disease Sister    • Prostate cancer Brother    • Prostate cancer Brother    • Prostate cancer Brother        Social History:  Social History   Substance Use Topics   • Smoking status: Former Smoker     Packs/day: 1.50      Years: 10.00   • Smokeless tobacco: None   • Alcohol use No        Objective     Physical Exam:    Head: normocephalic, without obvious abnormality and atraumatic  Lungs: clear to auscultation, respirations regular, respirations even and respirations unlabored  Heart: regular rhythm & normal rate  Abdomen: normal bowel sounds and no rebound tenderness    Vital Signs  Temp:  [97.3 °F (36.3 °C)] 97.3 °F (36.3 °C)  Heart Rate:  [66] 66  Resp:  [20] 20  BP: (124)/(67) 124/67    Anticipated Surgical Procedure:  CHRONIC LEUKOPENIA/ Neutropenia for bone marrow biopsy and aspiration    The risks, benefits and alternatives of this procedure have been discussed with the patient or responsible party: Yes        Vimal Gonzales MD  09/13/17  8:13 AM

## 2017-09-13 NOTE — H&P (VIEW-ONLY)
Subjective     REASON FOR FOLLOW UP:   1. CHRONIC LEUKOPENIA/ Neutropenia  2. IgA Lambda MGUS  3. Ulcerative Colitis  4. Positive ALEC    HISTORY OF PRESENT ILLNESS:  The patient is a 82 y.o. year old female who is here for follow up of the above noted  issuse.  She had actually been evaluated by Dr. Choi in our practice in 2014 for leukopenia.  At that time Dr. Choi had sent a peripheral blood flow cytometry which did not show any abnormal populations of white cells.  Ms. Villeda reports that she is actually feeling well in general.  She's not had any recurring infections.  Her white count remains low but stable.  Her platelets and hemoglobin are unremarkable.    With her visit here 6 months ago, we performed additional lab studies that revealed an IgA lambda monoclonal gammopathy.  She had repeat serum protein studies performed last week on 8/30/2017 that showed her IgA level and monoclonal spike had increased slightly.  Her white blood count remained low with an absolute neutrophil count of 820.  Her serum calcium level was also borderline elevated at 10.4 with a serum creatinine of 0.88.    I discussed these results with the patient and with the patient's daughter by phone.  I think it would be best if we proceed with a bone marrow aspirate and biopsy to be sure that there is no myeloma or lymphoma lurking in the marrow to explain these abnormalities.    She also was noted to have a positive ALEC and positive RNP antibodies.    History of Present Illness       Current Outpatient Prescriptions on File Prior to Visit   Medication Sig Dispense Refill   • Acetaminophen (PAIN RELIEVER PO) Take  by mouth.     • calcium carbonate (OS-ALIDA) 600 MG tablet Take  by mouth daily.     • furosemide (LASIX) 20 MG tablet TAKE 2 TABLETS IN THE MORNING  AND TAKE 1 TABLET AT NIGHT 270 tablet 3   • gabapentin (NEURONTIN) 300 MG capsule Take 1 capsule by mouth 3 (Three) Times a Day. 270 capsule 0   • mesalamine (LIALDA) 1.2 G EC  "tablet Take 4 tablets daily 360 tablet 1   • omeprazole (priLOSEC) 20 MG capsule Take 1 capsule by mouth Daily. 90 capsule 3   • vitamin B-12 (CYANOCOBALAMIN) 1000 MCG tablet Take  by mouth daily.     • warfarin (COUMADIN) 5 MG tablet Take 1 tablet by mouth Daily. 104 tablet 3     No current facility-administered medications on file prior to visit.         ALLERGIES:    Allergies   Allergen Reactions   • Amitriptyline    • Amoxicillin-Pot Clavulanate    • Aspirin    • Bactrim [Sulfamethoxazole-Trimethoprim]    • Iodinated Diagnostic Agents    • Latex    • Naproxen    • Nsaids    • Soma Compound With Codeine [Carisoprodol-Aspirin-Codeine]    • Sulfa Antibiotics           Review of Systems   Constitutional: Negative for activity change, appetite change, fatigue, fever and unexpected weight change.   HENT: Negative for hearing loss, nosebleeds, trouble swallowing and voice change.    Eyes: Negative for visual disturbance.   Respiratory: Negative for cough, shortness of breath and wheezing.    Cardiovascular: Negative for chest pain and palpitations.   Gastrointestinal: Negative for abdominal pain, nausea and vomiting.   Genitourinary: Negative for difficulty urinating, frequency, hematuria and urgency.   Musculoskeletal: Positive for arthralgias and gait problem. Negative for back pain and neck pain.   Skin: Negative for rash.   Neurological: Negative for dizziness, seizures, syncope and headaches.        Balance issues.   Hematological: Negative for adenopathy. Does not bruise/bleed easily.   Psychiatric/Behavioral: Negative for behavioral problems. The patient is not nervous/anxious.       Objective     Vitals:    09/06/17 0849   BP: 132/76   Pulse: 68   Resp: 18   Temp: 97.5 °F (36.4 °C)   TempSrc: Oral   SpO2: 98%   Weight: 243 lb 6.4 oz (110 kg)   Height: 64.96\" (165 cm)  Comment: new ht   PainSc: 5  Comment: all over pain, mainly rt shoulder     Current Status 9/6/2017   ECOG score 1       Physical Exam "   Constitutional: She is oriented to person, place, and time. She appears well-developed and well-nourished. No distress.   HENT:   Head: Normocephalic.   Eyes: Conjunctivae and EOM are normal. Pupils are equal, round, and reactive to light. No scleral icterus.   Neck: Normal range of motion. Neck supple. No JVD present. No thyromegaly present.   Cardiovascular: Normal rate and regular rhythm.  Exam reveals no gallop and no friction rub.    Murmur heard.  Pulmonary/Chest: Effort normal and breath sounds normal. She has no wheezes. She has no rales.   Abdominal: Soft. She exhibits no distension and no mass. There is no tenderness.   Musculoskeletal: Normal range of motion. She exhibits no edema or deformity.   Lymphadenopathy:     She has no cervical adenopathy.   Neurological: She is alert and oriented to person, place, and time. She has normal reflexes. No cranial nerve deficit.   Ambulates with a cane.   Skin: Skin is warm and dry. No rash noted. No erythema.   Psychiatric: She has a normal mood and affect. Her behavior is normal. Judgment normal.         RECENT LABS:  Hematology WBC   Date Value Ref Range Status   08/30/2017 2.94 (L) 4.00 - 10.00 10*3/mm3 Final     RBC   Date Value Ref Range Status   08/30/2017 3.87 (L) 3.90 - 5.00 10*6/mm3 Final     Hemoglobin   Date Value Ref Range Status   08/30/2017 12.3 11.5 - 14.9 g/dL Final     Hematocrit   Date Value Ref Range Status   08/30/2017 38.2 34.0 - 45.0 % Final     Platelets   Date Value Ref Range Status   08/30/2017 167 150 - 375 10*3/mm3 Final        Lab Results   Component Value Date    GLUCOSE 96 08/30/2017    BUN 16 08/30/2017    CREATININE 0.88 08/30/2017    EGFRIFNONA  09/12/2016      Comment:      <15 Indicative of kidney failure.    EGFRIFAFRI 75 08/30/2017    BCR 18.2 08/30/2017    K 4.0 08/30/2017    CO2 27.5 08/30/2017    CALCIUM 10.4 (H) 08/30/2017    PROTENTOTREF 7.5 08/30/2017    ALBUMIN 3.90 08/30/2017    ALBUMIN 3.4 08/30/2017    LABIL2 1.0 (L)  08/30/2017    LABIL2 0.9 08/30/2017    AST 18 08/30/2017    ALT 9 08/30/2017     Lab Results   Component Value Date    YZTBJSZU95 1174 (H) 08/30/2017 8/30/2017  IgG 700 - 1600 mg/dL 1331   IgA 64 - 422 mg/dL 788 (H)   IgM 26 - 217 mg/dL 67   Total Protein 6.0 - 8.5 g/dL 7.5   Albumin 2.9 - 4.4 g/dL 3.4   Alpha-1-Globulin 0.0 - 0.4 g/dL 0.3   Alpha-2-Globulin 0.4 - 1.0 g/dL 0.7   Beta Globulin 0.7 - 1.3 g/dL 1.6 (H)   Gamma Globulin 0.4 - 1.8 g/dL 1.5   M-Kip Not Observed g/dL Comment:   Comments: Due to the small quantity of monoclonal protein, unable to   quantitate the M-spike.   Globulin 2.2 - 3.9 g/dL 4.1 (H)   A/G Ratio 0.7 - 1.7 0.9   Immunofixation Reflex, Serum  Comment   Comments: Immunofixation shows IgA monoclonal protein with lambda light chain      Free Light Chain, Kappa 3.3 - 19.4 mg/L 42.6 (H)   Free Lambda Light Chains 5.7 - 26.3 mg/L 50.3 (H)   Kappa/Lambda Ratio 0.26 - 1.65 0.85       Assessment/Plan   1.  Chronic leukopenia/neutropenia without any recurring infections.  As noted above she been evaluated in our office in 2014 and at that time peripheral blood flow cytometry was normal.  I suspect this may be a combination of benign constitutional leukopenia, which is common among the  population with some contribution or worsening of leukopenia due to her Lialda for ulcerative colitis or possible autoimmune leukopenia.  Her counts remain low but stable.  2.  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  Her IgA level and lambda light chains have increased slightly over the past 6 months.  We discussed the option of a bone marrow aspirate and biopsy to rule out malignancy in the marrow as a cause for these abnormalities.    3.  Ulcerative colitis.  She continues to follow-up with her gastroenterologist Dr. English  4.  Positive ALEC on previous lab work.  It is possible she could also have some degree of autoimmune neutropenia.    Plan    1.  After discussing the  situation with the patient and her daughter, they've agreed to undergo a CT-guided bone marrow biopsy next week.  She will have to interrupt her Coumadin temporarily.  2.  She'll be scheduled to follow-up at our [facility] office a couple of weeks after the biopsy to go over the results.  Obviously if there are any significant abnormalities in the marrow we can discuss treatment options.  If the marrow is unremarkable with no evidence of myeloma or other malignancies, then we most likely will get back on a schedule of following these abnormalities with labs every 6 months.

## 2017-09-14 ENCOUNTER — TELEPHONE (OUTPATIENT)
Dept: INTERVENTIONAL RADIOLOGY/VASCULAR | Facility: HOSPITAL | Age: 82
End: 2017-09-14

## 2017-09-14 LAB — REF LAB TEST METHOD: NORMAL

## 2017-09-18 NOTE — TELEPHONE ENCOUNTER
Completed form faxed to [facility] @ [phone] - confirmation received.

## 2017-09-22 LAB
LAB AP CASE REPORT: NORMAL
LAB AP CLINICAL INFORMATION: NORMAL
Lab: NORMAL
PATH REPORT.ADDENDUM SPEC: NORMAL
PATH REPORT.FINAL DX SPEC: NORMAL
PATH REPORT.GROSS SPEC: NORMAL

## 2017-09-25 ENCOUNTER — OFFICE VISIT (OUTPATIENT)
Dept: ONCOLOGY | Facility: CLINIC | Age: 82
End: 2017-09-25

## 2017-09-25 ENCOUNTER — APPOINTMENT (OUTPATIENT)
Dept: OTHER | Facility: HOSPITAL | Age: 82
End: 2017-09-25

## 2017-09-25 VITALS
RESPIRATION RATE: 16 BRPM | SYSTOLIC BLOOD PRESSURE: 122 MMHG | HEIGHT: 65 IN | WEIGHT: 243 LBS | OXYGEN SATURATION: 98 % | HEART RATE: 69 BPM | DIASTOLIC BLOOD PRESSURE: 69 MMHG | BODY MASS INDEX: 40.48 KG/M2 | TEMPERATURE: 98.4 F

## 2017-09-25 DIAGNOSIS — R71.8 OTHER ABNORMALITY OF RED BLOOD CELLS: ICD-10-CM

## 2017-09-25 DIAGNOSIS — D47.2 MGUS (MONOCLONAL GAMMOPATHY OF UNKNOWN SIGNIFICANCE): Primary | ICD-10-CM

## 2017-09-25 DIAGNOSIS — D70.8 OTHER NEUTROPENIA (HCC): ICD-10-CM

## 2017-09-25 LAB
FERRITIN SERPL-MCNC: 40.2 NG/ML (ref 13–150)
HGB RETIC QN: 34.3 PG (ref 32.7–38.6)
IMM RETICS NFR: 10.5 % (ref 0.7–13.7)
IRON 24H UR-MRATE: 64 MCG/DL (ref 37–145)
IRON SATN MFR SERPL: 16 % (ref 20–50)
RETICS/RBC NFR AUTO: 0.93 % (ref 0.5–1.5)
TIBC SERPL-MCNC: 408 MCG/DL (ref 298–536)
TRANSFERRIN SERPL-MCNC: 274 MG/DL (ref 200–360)

## 2017-09-25 PROCEDURE — 82728 ASSAY OF FERRITIN: CPT | Performed by: INTERNAL MEDICINE

## 2017-09-25 PROCEDURE — 36415 COLL VENOUS BLD VENIPUNCTURE: CPT | Performed by: INTERNAL MEDICINE

## 2017-09-25 PROCEDURE — 99213 OFFICE O/P EST LOW 20 MIN: CPT | Performed by: INTERNAL MEDICINE

## 2017-09-25 PROCEDURE — 85046 RETICYTE/HGB CONCENTRATE: CPT | Performed by: INTERNAL MEDICINE

## 2017-09-25 PROCEDURE — 83540 ASSAY OF IRON: CPT | Performed by: INTERNAL MEDICINE

## 2017-09-25 PROCEDURE — 84466 ASSAY OF TRANSFERRIN: CPT | Performed by: INTERNAL MEDICINE

## 2017-09-25 RX ORDER — FERROUS SULFATE 325(65) MG
325 TABLET ORAL
Qty: 60 TABLET | Refills: 5 | Status: SHIPPED | OUTPATIENT
Start: 2017-09-25 | End: 2018-02-14 | Stop reason: SDUPTHER

## 2017-10-03 ENCOUNTER — OFFICE VISIT (OUTPATIENT)
Dept: FAMILY MEDICINE CLINIC | Facility: CLINIC | Age: 82
End: 2017-10-03

## 2017-10-03 VITALS
SYSTOLIC BLOOD PRESSURE: 122 MMHG | OXYGEN SATURATION: 98 % | DIASTOLIC BLOOD PRESSURE: 70 MMHG | BODY MASS INDEX: 40.45 KG/M2 | WEIGHT: 242.8 LBS | HEART RATE: 62 BPM | HEIGHT: 65 IN | TEMPERATURE: 97.5 F

## 2017-10-03 DIAGNOSIS — Z23 ENCOUNTER FOR IMMUNIZATION: ICD-10-CM

## 2017-10-03 DIAGNOSIS — R25.2 MUSCLE CRAMPS: ICD-10-CM

## 2017-10-03 DIAGNOSIS — M19.90 GENERALIZED ARTHRITIS: Primary | ICD-10-CM

## 2017-10-03 PROCEDURE — 99213 OFFICE O/P EST LOW 20 MIN: CPT | Performed by: FAMILY MEDICINE

## 2017-10-03 PROCEDURE — G0008 ADMIN INFLUENZA VIRUS VAC: HCPCS | Performed by: FAMILY MEDICINE

## 2017-10-03 PROCEDURE — 90662 IIV NO PRSV INCREASED AG IM: CPT | Performed by: FAMILY MEDICINE

## 2017-10-03 RX ORDER — OMEPRAZOLE 40 MG/1
40 CAPSULE, DELAYED RELEASE ORAL 2 TIMES DAILY
COMMUNITY
End: 2017-11-30 | Stop reason: DRUGHIGH

## 2017-10-03 NOTE — PROGRESS NOTES
Subjective   Brittany Villeda is a 82 y.o. female. CC: arthritis    History of Present Illness   Brittany is an 82 year old female who comes in today with the complaint of arthritis.    She complains of pain in both of her shoulders.  She has had arthroscopic surgery in the right shoulder.  She denies any known injury to the shoulders.  She is able to fix her hair but she has pain with it.  She has had the shoulder pain for about 2 weeks now.  It started after she mowed her lawn.  She also complains of pain in her left hip.  Difficult to walk.  She states that when she rolls onto her left side in bed, pain shoots from the left hip to the right.  The hip has been bothering her for about 2 months.  No known injury.  She has low back pain which has worsened.  Has a history of low back surgery.  Has seen Dr. Martinez for her back in the past.  Using a cane to walk.    Taking Tylenol for the pain which gives her a little relief but not much.  Cannot take any NSAIDs because she is on Coumadin.  Cannot take Tramadol because she is allergic to Soma compound and there could be a cross reaction.        The following portions of the patient's history were reviewed and updated as appropriate: allergies, current medications, past medical history, past social history, past surgical history and problem list.    Review of Systems   Constitutional: Negative.  Negative for fatigue and unexpected weight change.   HENT: Negative.  Negative for congestion.    Respiratory: Negative.  Negative for cough, shortness of breath and wheezing.    Cardiovascular: Negative.  Negative for chest pain, palpitations and leg swelling.   Gastrointestinal: Negative.  Negative for abdominal pain, diarrhea and nausea.   Genitourinary: Negative.  Negative for difficulty urinating.   Musculoskeletal: Positive for arthralgias and back pain.   Skin: Negative.  Negative for rash.   Neurological: Negative.  Negative for dizziness, light-headedness, numbness and  headaches.   Psychiatric/Behavioral: Negative.  Negative for dysphoric mood and sleep disturbance. The patient is not nervous/anxious.        Objective   Physical Exam   Constitutional: She is oriented to person, place, and time. She appears well-developed and well-nourished.   Neck: Normal range of motion. Neck supple.   Cardiovascular: Normal rate, regular rhythm and normal heart sounds.    Pulmonary/Chest: Effort normal and breath sounds normal.   Musculoskeletal:        Right shoulder: She exhibits tenderness and pain.        Left shoulder: She exhibits tenderness and pain.        Left hip: She exhibits normal range of motion and no tenderness.        Lumbar back: She exhibits tenderness and pain.   Neurological: She is alert and oriented to person, place, and time.   Skin: Skin is warm and dry. No rash noted.   Psychiatric: She has a normal mood and affect. Her behavior is normal.   Nursing note and vitals reviewed.    Vitals:    10/03/17 0808   BP: 122/70   Pulse: 62   Temp: 97.5 °F (36.4 °C)   SpO2: 98%     Assessment/Plan   Problems Addressed this Visit     None      Visit Diagnoses     Generalized arthritis    -  Primary    Relevant Orders    Sedimentation rate, automated    ALEC    Muscle cramps        Relevant Orders    Comprehensive metabolic panel    Encounter for immunization        Relevant Orders    Flu Vaccine High Dose PF 65YR+ (Completed)          After obtaining informed consent, the immunization is given by Praveena LILLY    She did not want any x-rays of the areas she is having pain.  Does not want referral to ortho.  Advised to check in with Dr. Martinez concerning the back pain.

## 2017-10-04 ENCOUNTER — HOSPITAL ENCOUNTER (OUTPATIENT)
Dept: CARDIOLOGY | Facility: HOSPITAL | Age: 82
Setting detail: RECURRING SERIES
Discharge: HOME | End: 2017-10-04

## 2017-10-04 LAB
ALBUMIN SERPL-MCNC: 4.2 G/DL (ref 3.5–5.2)
ALBUMIN/GLOB SERPL: 1.2 G/DL
ALP SERPL-CCNC: 66 U/L (ref 39–117)
ALT SERPL-CCNC: 9 U/L (ref 1–33)
ANA SER QL: POSITIVE
AST SERPL-CCNC: 18 U/L (ref 1–32)
BILIRUB SERPL-MCNC: 1.1 MG/DL (ref 0.1–1.2)
BUN SERPL-MCNC: 17 MG/DL (ref 8–23)
BUN/CREAT SERPL: 18.7 (ref 7–25)
CALCIUM SERPL-MCNC: 10.5 MG/DL (ref 8.6–10.5)
CHLORIDE SERPL-SCNC: 104 MMOL/L (ref 98–107)
CO2 SERPL-SCNC: 27.5 MMOL/L (ref 22–29)
CREAT SERPL-MCNC: 0.91 MG/DL (ref 0.57–1)
DSDNA AB SER-ACNC: <1 IU/ML (ref 0–9)
ERYTHROCYTE [SEDIMENTATION RATE] IN BLOOD BY WESTERGREN METHOD: 38 MM/HR (ref 0–30)
GLOBULIN SER CALC-MCNC: 3.5 GM/DL
GLUCOSE SERPL-MCNC: 100 MG/DL (ref 65–99)
Lab: NORMAL
POTASSIUM SERPL-SCNC: 3.8 MMOL/L (ref 3.5–5.2)
PROT SERPL-MCNC: 7.7 G/DL (ref 6–8.5)
SODIUM SERPL-SCNC: 145 MMOL/L (ref 136–145)

## 2017-10-04 PROCEDURE — 85610 PROTHROMBIN TIME: CPT

## 2017-10-04 PROCEDURE — 36416 COLLJ CAPILLARY BLOOD SPEC: CPT

## 2017-10-06 DIAGNOSIS — M25.50 ARTHRALGIA, UNSPECIFIED JOINT: ICD-10-CM

## 2017-10-06 DIAGNOSIS — R76.8 POSITIVE ANA (ANTINUCLEAR ANTIBODY): Primary | ICD-10-CM

## 2017-10-18 RX ORDER — GABAPENTIN 300 MG/1
CAPSULE ORAL
Qty: 270 CAPSULE | Refills: 0 | OUTPATIENT
Start: 2017-10-18 | End: 2017-10-19 | Stop reason: SDUPTHER

## 2017-10-19 RX ORDER — GABAPENTIN 300 MG/1
300 CAPSULE ORAL 3 TIMES DAILY
Qty: 270 CAPSULE | Refills: 0 | Status: SHIPPED | OUTPATIENT
Start: 2017-10-19 | End: 2018-01-02 | Stop reason: SDUPTHER

## 2017-11-01 ENCOUNTER — HOSPITAL ENCOUNTER (OUTPATIENT)
Dept: CARDIOLOGY | Facility: HOSPITAL | Age: 82
Setting detail: RECURRING SERIES
Discharge: HOME | End: 2017-11-01

## 2017-11-01 PROCEDURE — 36416 COLLJ CAPILLARY BLOOD SPEC: CPT

## 2017-11-01 PROCEDURE — 85610 PROTHROMBIN TIME: CPT

## 2017-11-02 RX ORDER — MESALAMINE 1.2 G/1
TABLET, DELAYED RELEASE ORAL
Qty: 360 TABLET | Refills: 0 | Status: SHIPPED | OUTPATIENT
Start: 2017-11-02 | End: 2017-11-30 | Stop reason: SDUPTHER

## 2017-11-10 ENCOUNTER — HOSPITAL ENCOUNTER (OUTPATIENT)
Dept: GENERAL RADIOLOGY | Facility: HOSPITAL | Age: 82
Discharge: HOME | End: 2017-11-10

## 2017-11-10 ENCOUNTER — HOSPITAL ENCOUNTER (OUTPATIENT)
Dept: GENERAL RADIOLOGY | Facility: HOSPITAL | Age: 82
Discharge: HOME | End: 2017-11-10
Admitting: NURSE PRACTITIONER

## 2017-11-10 DIAGNOSIS — M54.9 DORSALGIA: ICD-10-CM

## 2017-11-10 PROCEDURE — 73610 X-RAY EXAM OF ANKLE: CPT

## 2017-11-10 PROCEDURE — 72202 X-RAY EXAM SI JOINTS 3/> VWS: CPT

## 2017-11-10 PROCEDURE — 71020 HC CHEST PA AND LATERAL: CPT

## 2017-11-30 ENCOUNTER — TELEPHONE (OUTPATIENT)
Dept: GASTROENTEROLOGY | Facility: CLINIC | Age: 82
End: 2017-11-30

## 2017-11-30 ENCOUNTER — HOSPITAL ENCOUNTER (OUTPATIENT)
Dept: CARDIOLOGY | Facility: HOSPITAL | Age: 82
Setting detail: RECURRING SERIES
Discharge: HOME | End: 2017-11-30

## 2017-11-30 ENCOUNTER — OFFICE VISIT (OUTPATIENT)
Dept: GASTROENTEROLOGY | Facility: CLINIC | Age: 82
End: 2017-11-30

## 2017-11-30 VITALS
SYSTOLIC BLOOD PRESSURE: 120 MMHG | BODY MASS INDEX: 39.15 KG/M2 | WEIGHT: 235 LBS | TEMPERATURE: 98 F | DIASTOLIC BLOOD PRESSURE: 78 MMHG | HEIGHT: 65 IN

## 2017-11-30 DIAGNOSIS — K21.9 GASTROESOPHAGEAL REFLUX DISEASE WITHOUT ESOPHAGITIS: ICD-10-CM

## 2017-11-30 DIAGNOSIS — K51.30 ULCERATIVE RECTOSIGMOIDITIS WITHOUT COMPLICATION (HCC): Primary | ICD-10-CM

## 2017-11-30 PROCEDURE — 99213 OFFICE O/P EST LOW 20 MIN: CPT | Performed by: INTERNAL MEDICINE

## 2017-11-30 PROCEDURE — 85610 PROTHROMBIN TIME: CPT

## 2017-11-30 PROCEDURE — 36416 COLLJ CAPILLARY BLOOD SPEC: CPT

## 2017-11-30 RX ORDER — MESALAMINE 1.2 G/1
4800 TABLET, DELAYED RELEASE ORAL DAILY
Qty: 360 TABLET | Refills: 3 | Status: SHIPPED | OUTPATIENT
Start: 2017-11-30 | End: 2018-09-12 | Stop reason: SDUPTHER

## 2017-11-30 RX ORDER — OMEPRAZOLE 20 MG/1
20 CAPSULE, DELAYED RELEASE ORAL DAILY
Qty: 90 CAPSULE | Refills: 3 | Status: SHIPPED | OUTPATIENT
Start: 2017-11-30 | End: 2018-09-07 | Stop reason: SDUPTHER

## 2017-11-30 RX ORDER — PREDNISONE 1 MG/1
TABLET ORAL
COMMUNITY
Start: 2017-11-08 | End: 2017-12-12

## 2017-11-30 NOTE — PROGRESS NOTES
Chief Complaint   Patient presents with   • Med Refill     Lialda, Omeprazole       Brittany Villeda is a  82 y.o. 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  Last EGD was also in 2014 with no esophageal abnormalities.    BM are good - no blood in stool.  She is now on iron and stools are black.  Not constipated but stools are pretty formed.  Lialda 4.8 gm/daily.    She has episodes of acid brash but this is infrequent - happens after dinner and sometimes at night. She is tolerating the decreased dose of omeprazole ok - no real difference.  Taking 20 mg once daily.    She has been struggling with diffuse myalgias/arthralgias.  She has right hip and low back pain.  She has had a diffuse w/u and is seeing a neurologist.    She has rcvd her flu vaccine.        HPI  Past Medical History:   Diagnosis Date   • Anemia    • Arthritis    • Asthma    • Colitis    • COPD (chronic obstructive pulmonary disease)    • Diastolic dysfunction    • GERD (gastroesophageal reflux disease)    • Hypertension    • Hypertensive heart disease    • Hyperthyroidism    • Low back pain    • MGUS (monoclonal gammopathy of unknown significance)    • Nephrolithiasis    • Obesity    • Paroxysmal atrial fibrillation    • Peptic ulceration    • Pulmonary hypertension    • Sleep apnea    • Ventricular tachycardia     nonsustained   • Vertigo      Past Surgical History:   Procedure Laterality Date   • CHOLECYSTECTOMY     • COLONOSCOPY  06/16/2014    colitis, cryptitis,  tics, NBIH, TA w/low grade dysplasia   • HYSTERECTOMY     • KNEE ARTHROPLASTY     • MYOMECTOMY     • SINUS SURGERY     • TONSILLECTOMY         Current Outpatient Prescriptions:   •  calcium carbonate (OS-ALIDA) 600 MG tablet, Take  by mouth daily., Disp: , Rfl:   •  ferrous sulfate 325 (65 FE) MG tablet, Take 1 tablet by mouth Daily With Breakfast & Dinner. (Patient taking differently: Take 325 mg by mouth 2 (Two) Times a Day.), Disp:  60 tablet, Rfl: 5  •  furosemide (LASIX) 20 MG tablet, TAKE 2 TABLETS IN THE MORNING  AND TAKE 1 TABLET AT NIGHT, Disp: 270 tablet, Rfl: 3  •  gabapentin (NEURONTIN) 300 MG capsule, Take 1 capsule by mouth 3 (Three) Times a Day., Disp: 270 capsule, Rfl: 0  •  mesalamine (LIALDA) 1.2 g EC tablet, Take 4 tablets by mouth Daily., Disp: 360 tablet, Rfl: 3  •  mometasone (ELOCON) 0.1 % ointment, Apply  topically 2 (Two) Times a Day. Bid x1 month, till oct 23      Every other day, for once per day, Disp: , Rfl:   •  predniSONE (DELTASONE) 5 MG tablet, , Disp: , Rfl:   •  vitamin B-12 (CYANOCOBALAMIN) 1000 MCG tablet, Take  by mouth daily., Disp: , Rfl:   •  warfarin (COUMADIN) 5 MG tablet, Take 1 tablet by mouth Daily. (Patient taking differently: Take 5 mg by mouth Daily. Last taken 9/9/17), Disp: 104 tablet, Rfl: 3  •  Acetaminophen (PAIN RELIEVER PO), Take  by mouth., Disp: , Rfl:   •  omeprazole (priLOSEC) 20 MG capsule, Take 1 capsule by mouth Daily., Disp: 90 capsule, Rfl: 3  PRN Meds:.  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[employer]     Social History Main Topics   • Smoking status: Former Smoker     Packs/day: 1.50     Years: 10.00   • Smokeless tobacco: Never Used   • Alcohol use No   • Drug use: No   • Sexual activity: No     Other Topics Concern   • Not on file     Social History Narrative     Family History   Problem Relation Age of Onset   • Diabetes Mother    • Breast cancer Sister    • Kidney cancer Sister    •  Heart disease Sister    • Prostate cancer Brother    • Prostate cancer Brother    • Prostate cancer Brother      Review of Systems   Constitutional: Negative for appetite change and unexpected weight change.   Gastrointestinal: Negative for abdominal pain, blood in stool, constipation, nausea and vomiting.   Musculoskeletal: Positive for arthralgias and myalgias.   Neurological: Positive for weakness.   All other systems reviewed and are negative.    Vitals:    11/30/17 0842   BP: 120/78   Temp: 98 °F (36.7 °C)     Last Weight    11/30/17 0842   Weight: 235 lb (107 kg)     Physical Exam   Constitutional: She appears well-developed and well-nourished.   HENT:   Head: Normocephalic and atraumatic.   Eyes: No scleral icterus.   Pulmonary/Chest: Effort normal.   Abdominal: Soft. She exhibits no distension and no mass. There is no tenderness.   Neurological: She is alert.   Skin: Skin is warm and dry.   Psychiatric: She has a normal mood and affect.     No images are attached to the encounter.  Diagnoses and all orders for this visit:    Ulcerative rectosigmoiditis without complication    Gastroesophageal reflux disease without esophagitis    Other orders  -     predniSONE (DELTASONE) 5 MG tablet;   -     omeprazole (priLOSEC) 20 MG capsule; Take 1 capsule by mouth Daily.  -     mesalamine (LIALDA) 1.2 g EC tablet; Take 4 tablets by mouth Daily.    Plan:  - she is stable form GI standpoint  - continue current meds  - BM bx noted and Dr Valadez's notes reviewed - given stability of GI symptoms, lack of gross bleeding and normal Hb would defer endoscopic eval at this time - chronic blood loss in IBD with AC likely responsible for low iron but will follow closely and can reconsider should situation change

## 2017-12-05 ENCOUNTER — TRANSCRIBE ORDERS (OUTPATIENT)
Dept: PHYSICAL THERAPY | Facility: HOSPITAL | Age: 82
End: 2017-12-05

## 2017-12-05 DIAGNOSIS — M51.36 DDD (DEGENERATIVE DISC DISEASE), LUMBAR: Primary | ICD-10-CM

## 2017-12-05 DIAGNOSIS — M41.9 SCOLIOSIS OF LUMBAR SPINE, UNSPECIFIED SCOLIOSIS TYPE: ICD-10-CM

## 2017-12-06 ENCOUNTER — TELEPHONE (OUTPATIENT)
Dept: FAMILY MEDICINE CLINIC | Facility: CLINIC | Age: 82
End: 2017-12-06

## 2017-12-06 NOTE — TELEPHONE ENCOUNTER
Brittany wright wants to see you before you leave to go over labs from the neuro dr. Having pain in her calfs and hands. [phone]

## 2017-12-12 ENCOUNTER — OFFICE VISIT (OUTPATIENT)
Dept: FAMILY MEDICINE CLINIC | Facility: CLINIC | Age: 82
End: 2017-12-12

## 2017-12-12 VITALS
BODY MASS INDEX: 40.29 KG/M2 | HEIGHT: 65 IN | DIASTOLIC BLOOD PRESSURE: 65 MMHG | HEART RATE: 78 BPM | WEIGHT: 241.8 LBS | TEMPERATURE: 98.2 F | OXYGEN SATURATION: 96 % | SYSTOLIC BLOOD PRESSURE: 115 MMHG

## 2017-12-12 DIAGNOSIS — R76.8 POSITIVE ANA (ANTINUCLEAR ANTIBODY): ICD-10-CM

## 2017-12-12 DIAGNOSIS — M19.90 ARTHRITIS: Primary | ICD-10-CM

## 2017-12-12 PROCEDURE — 99213 OFFICE O/P EST LOW 20 MIN: CPT | Performed by: FAMILY MEDICINE

## 2017-12-12 NOTE — PROGRESS NOTES
Subjective   Brittany Villeda is a 82 y.o. female. CC: arthritis    History of Present Illness   Brittany is an 82 year old female who comes in today because of arthritis.  She is having a lot of trouble with her hands.  She states that her fingers stiffen up and almost become locked when she is trying to do something like needlework or writing.  She has to rub them together to loosen then up.    She also complains of pain in the right anterior shin.  She did see rheumatology.  X-rays were done.  She also had extensive lab work---she states that they took 25 tubes of blood.  She went back for a followup visit.  She states that the provider did not review the labs with her and give her much information about future care.  I reviewed the labs with her and they have been scanned in to her chart.  I so not have information from her last office visit with them.  Will request it.      The following portions of the patient's history were reviewed and updated as appropriate: allergies, current medications, past medical history, past social history, past surgical history and problem list.    Review of Systems   Constitutional: Negative.  Negative for fatigue and unexpected weight change.   HENT: Negative.  Negative for congestion.    Respiratory: Negative.  Negative for cough, shortness of breath and wheezing.    Cardiovascular: Negative.  Negative for chest pain, palpitations and leg swelling.   Gastrointestinal: Negative.  Negative for abdominal pain, constipation, diarrhea and nausea.   Genitourinary: Negative.  Negative for difficulty urinating.   Musculoskeletal: Positive for arthralgias.   Skin: Negative.  Negative for rash.   Neurological: Negative.  Negative for dizziness, light-headedness and headaches.   Psychiatric/Behavioral: Negative.  Negative for dysphoric mood and sleep disturbance. The patient is not nervous/anxious.        Objective   Physical Exam   Constitutional: She is oriented to person, place, and  "time. She appears well-developed and well-nourished.   Neck: Normal range of motion. Neck supple.   Cardiovascular: Normal rate, regular rhythm and normal heart sounds.    Pulmonary/Chest: Effort normal and breath sounds normal.   Musculoskeletal:   Arthritic changes in all of her fingers.   Neurological: She is alert and oriented to person, place, and time.   Skin: Skin is warm and dry. No rash noted.   Psychiatric: She has a normal mood and affect. Her behavior is normal.   Nursing note and vitals reviewed.    Vitals:    12/12/17 1336   BP: 115/65   Pulse: 78   Temp: 98.2 °F (36.8 °C)   SpO2: 96%   Weight: 110 kg (241 lb 12.8 oz)   Height: 165 cm (64.96\")     Assessment/Plan   Problems Addressed this Visit     None      Visit Diagnoses     Arthritis    -  Primary    Positive ALEC (antinuclear antibody)            Have requested notes of last office visit from her rheumatologist.  Will review that and then get in touch with Mrs. Villeda.         "

## 2017-12-18 ENCOUNTER — TELEPHONE (OUTPATIENT)
Dept: FAMILY MEDICINE CLINIC | Facility: CLINIC | Age: 82
End: 2017-12-18

## 2017-12-18 NOTE — TELEPHONE ENCOUNTER
Pt called wanting to know if you can write a letter for her to get her out of jury duty? She physically can not do jury duty

## 2017-12-18 NOTE — TELEPHONE ENCOUNTER
Have written the letter and will have someone type it.    Tell her that you will call her when it is ready.    Also let her know that we are still trying to get the records from her rheumatologist.

## 2017-12-19 NOTE — TELEPHONE ENCOUNTER
Letter mailed.    Received the notes from rheumatologist.  According to the notes she told them that she was doing a lot better at the time of the visit so they did not see the need to do anything further at that time..  Per the note she is to call them if things worsen before they see her back in May.

## 2017-12-21 ENCOUNTER — HOSPITAL ENCOUNTER (OUTPATIENT)
Dept: PHYSICAL THERAPY | Facility: HOSPITAL | Age: 82
Setting detail: THERAPIES SERIES
Discharge: HOME | End: 2017-12-21

## 2017-12-21 DIAGNOSIS — M25.551 RIGHT HIP PAIN: ICD-10-CM

## 2017-12-21 DIAGNOSIS — M51.36 DDD (DEGENERATIVE DISC DISEASE), LUMBAR: Primary | ICD-10-CM

## 2017-12-21 PROCEDURE — G8979 MOBILITY GOAL STATUS: HCPCS | Performed by: PHYSICAL THERAPIST

## 2017-12-21 PROCEDURE — G8978 MOBILITY CURRENT STATUS: HCPCS | Performed by: PHYSICAL THERAPIST

## 2017-12-21 PROCEDURE — 97161 PT EVAL LOW COMPLEX 20 MIN: CPT | Performed by: PHYSICAL THERAPIST

## 2017-12-21 PROCEDURE — 97110 THERAPEUTIC EXERCISES: CPT | Performed by: PHYSICAL THERAPIST

## 2017-12-21 NOTE — THERAPY EVALUATION
Outpatient Physical Therapy Ortho Initial Evaluation  [facility]     Patient Name: Brittany Villeda  : 1935  MRN: [id number]  Today's Date: 2017      Visit Date: 2017    Patient Active Problem List   Diagnosis   • Abnormal weight loss   • Tachycardia   • Sciatica   • Non-toxic multinodular goiter   • Nausea   • Low back pain   • Hyperthyroidism   • Hypertension   • Gastroesophageal reflux disease without esophagitis   • Fatigue   • Dizziness   • Diarrhea   • Cataract   • Abnormal thyroid stimulating hormone (TSH) level   • Abdominal pain   • Pulmonary hypertension   • Paroxysmal atrial fibrillation   • Diastolic dysfunction   • HUGO (obstructive sleep apnea)   • Trifascicular block   • Abnormal EKG   • Leukopenia   • MGUS (monoclonal gammopathy of unknown significance)   • Ulcerative rectosigmoiditis without complication        Past Medical History:   Diagnosis Date   • Anemia    • Arthritis    • Asthma    • Colitis    • COPD (chronic obstructive pulmonary disease)    • Diastolic dysfunction    • GERD (gastroesophageal reflux disease)    • Hypertension    • Hypertensive heart disease    • Hyperthyroidism    • Low back pain    • MGUS (monoclonal gammopathy of unknown significance)    • Nephrolithiasis    • Obesity    • Paroxysmal atrial fibrillation    • Peptic ulceration    • Pulmonary hypertension    • Sleep apnea    • Ventricular tachycardia     nonsustained   • Vertigo         Past Surgical History:   Procedure Laterality Date   • CHOLECYSTECTOMY     • COLONOSCOPY  2014    colitis, cryptitis,  tics, NBIH, TA w/low grade dysplasia   • HYSTERECTOMY     • KNEE ARTHROPLASTY     • MYOMECTOMY     • SINUS SURGERY     • TONSILLECTOMY         Visit Dx:     ICD-10-CM ICD-9-CM   1. DDD (degenerative disc disease), lumbar M51.36 722.52   2. Right hip pain M25.551 719.45             Patient History       17 1400          History    Chief Complaint Pain  -KH      Type of Pain Back pain   -      Date Current Problem(s) Began --   chronic  -      Brief Description of Current Complaint Patient is an 81 y/o female suffering from chronic LBP/R hip pain. Patient reports her back pain has been present for many years, however the hip only began bothering her about 3 months ago. 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 He recommended she try water therapy to alleviate her pain. Patient underwent a laminectomy back in 2000 and has also tried epidurals to alleviate her back pain. Patient reports difficulty standing for prolonged periods of time as well as completing daily tasks such as washing the dishes and vaccumming. She enjoys going to [employer] but has avoided it for the past two months due to pain with traveling and sitting.   -      Previous treatment for THIS PROBLEM Injections;Surgery  -      Onset Date- PT 12/21/17  -      Occupation/sports/leisure activities enjoys going to [employer]- hasn't gone in 2 months  -      Patient seeing anyone else for problem(s)? Yes  -      What clinical tests have you had for this problem? X-ray  -      Results of Clinical Tests DDD, scoliosis. Bone on bone hip OA  -KH      Pain     Pain Location Back  -      Pain at Present 7  -KH      Pain at Best 7  -KH      Pain at Worst 9  -KH      Pain Frequency Constant/continuous  -      Pain Description Aching  -      What Performance Factors Make the Current Problem(s) WORSE? standing  -      Tolerance Time- Standing 10-15 min  -      Tolerance Time- Sitting 30 min  -      Tolerance Time- Walking 15 min  -      Difficulties at work? not working  -      Difficulties with ADL's? trouble standing to do dishes, or doing anything in standing.   -      Difficulties with recreational activities? not as social due to pain  -      Fall Risk Assessment    Any falls in the past year: No  -      Does patient have a fear of falling Yes (comment)  -      Daily Activities    Primary Language  English  -KH      Are you able to read Yes  -KH      Are you able to write Yes  -KH      Patient is concerned about/has problems with Difficulty with self care (i.e. bathing, dressing, toileting:;Performing home management (household chores, shopping, care of dependents);Climbing Stairs;Standing;Transfers (getting out of a chair, bed);Walking  -KH      Pt Participated in POC and Goals Yes  -KH      Safety    Are you being hurt, hit, or frightened by anyone at home or in your life? No  -KH      Are you being neglected by a caregiver No  -KH        User Key  (r) = Recorded By, (t) = Taken By, (c) = Cosigned By    Initials Name Provider Type    GINGER Rae PT Physical Therapist              PT Ortho       12/21/17 1400    Subjective Comments    Subjective Comments I haven't gone to [employer] in over 2 months because of my pain  -KH    Subjective Pain    Able to rate subjective pain? yes  -KH    Pre-Treatment Pain Level 7  -KH    Post-Treatment Pain Level 7  -KH    Posture/Observations    Alignment Options Cervical lordosis;Thoracic kyphosis;Rounded shoulders;Forward head  -KH    Forward Head Moderate;Increased  -KH    Cervical Lordosis Moderate;Increased  -KH    Thoracic Kyphosis Moderate;Increased  -KH    Rounded Shoulders Moderate;Increased  -KH    Sensory Screen for Light Touch- Lower Quarter Clearing    L1 (inguinal area) Bilateral:;Intact  -KH    L2 (anterior mid thigh) Bilateral:;Intact  -KH    L3 (distal anterior thigh) Bilateral:;Intact  -KH    L4 (medial lower leg/foot) Bilateral:;Intact  -KH    L5 (lateral lower leg/great toe) Bilateral:;Intact  -KH    S1 (bottom of foot) Bilateral:;Intact  -KH    Myotomal Screen- Lower Quarter Clearing    Hip flexion (L2) Bilateral:;4- (Good -)  -KH    Knee extension (L3) Bilateral:;4 (Good)  -KH    Ankle DF (L4) Bilateral:;4 (Good)  -KH    Ankle PF (S1) Bilateral:;3+ (Fair +)  -KH    Knee flexion (S2) Bilateral:;4 (Good)  -KH    Lumbar ROM Screen- Lower Quarter Clearing     Lumbar Flexion Impaired   25% of WNL and painful  -KH    Lumbar Extension Impaired   25% of WNL and painful  -KH    Lumbar Lateral Flexion Impaired   25% of WNL and painful  -KH    SI/Hip Screen- Lower Quarter Clearing    Dipak's/Sigifredo's test Bilateral:;Positive  -KH    Posterior thigh sheer Bilateral:;Positive  -KH    Pain in Kam's area Bilateral:;Positive  -KH    Lower Extremity Flexibility    Hamstrings Bilateral:;Moderately limited  -KH    Hip External Rotators Bilateral:;Moderately limited  -KH    Transfers    Transfer, Comment heavily relies on L LE to perform sit to stand transfer and uses hands  -    Gait Assessment/Treatment    Gait, Comment uses rollator. Very forward flexed posture, decreased foot clearance, slow ovi, decreased step length  -    Stairs Assessment/Treatment    Stairs, Comment avoids stairs  -      User Key  (r) = Recorded By, (t) = Taken By, (c) = Cosigned By    Initials Name Provider Type    GINGER aRe, PT Physical Therapist          Therapy Education  Given: Symptoms/condition management, Pain management, HEP, Posture/body mechanics  Program: New  How Provided: Verbal, Demonstration, Written  Provided to: Patient  Level of Understanding: Teach back education performed           PT OP Goals       12/21/17 1400       PT Short Term Goals    STG 1 Patient will report a reduction in pain for 12-24 hours or greater following aquatic therapy session  -     STG 2 Patient will perform sit to stand from pool bench without using hands to demonstrate increased functional strength  -     STG 3 Patient will increase walking tolerance from 15 min to 20 min or greater in order to increase level of physical activity and allow patient to participate in social outings  -     Long Term Goals    LTG 1 Patient will improve 30 sec sit to stand test from 2.5 stands to 3 stands to demonstrate improved functional strength and reduced risk of falls  -     LTG 2 Patient will increase  standing tolerance from 10 min to 20 min or greater to increase ease with ADLs/household chores  -     LTG 3 Patient will improve perceived level of disability as measured by the Oswestry from 70% disability to </= 60% disability in order to improve quality of life  -       User Key  (r) = Recorded By, (t) = Taken By, (c) = Cosigned By    Initials Name Provider Type    GINGER Rae PT Physical Therapist                PT Assessment/Plan       12/21/17 1519       PT Assessment    Functional Limitations Limitations in community activities;Limitations in functional capacity and performance;Limitation in home management;Performance in self-care ADL;Impaired gait;Decreased safety during functional activities;Performance in leisure activities  -     Impairments Balance;Posture;Range of motion;Muscle strength;Pain;Gait;Endurance;Joint integrity;Joint mobility;Impaired flexibility  -     Assessment Comments Patient is an 83 y/o female suffering from chronic LBP/R hip pain. Patient reports her back pain has been present for many years, however the hip only began bothering her about 3 months ago. 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 He recommended she try water therapy to alleviate her pain. Patient underwent a laminectomy back in 2000 and has also tried epidurals to alleviate her back pain. Patient reports difficulty standing for prolonged periods of time as well as completing daily tasks such as washing the dishes and vaccumming. She enjoys going to [employer] but has avoided it for the past two months due to pain with traveling and sitting. Upon physical examination patient presents with severely limited lumbar mobility, decrease B LE strength with MMT, impaired functional strength with sit to stands, and gait deficits. Patient would benefit from skilled PT to address these deficits in order to improve safety and ease with daily mobility.   -     Please refer to paper survey for additional  self-reported information Yes  -KH     Rehab Potential Good  -KH     Patient/caregiver participated in establishment of treatment plan and goals Yes  -KH     Patient would benefit from skilled therapy intervention Yes  -KH     PT Plan    PT Frequency 2x/week  -     Predicted Duration of Therapy Intervention (days/wks) 5-6 weeks  -KH     Planned CPT's? PT EVAL LOW COMPLEXITY: 33814;PT AQUATIC THERAPY EA 15 MIN: 87366;PT THER PROC EA 15 MIN: 58546;PT RE-EVAL: 09268  -     Physical Therapy Interventions (Optional Details) aquatics exercise  -     PT Plan Comments LE strengthening, gait training, core stability, and LE flexibility  -       User Key  (r) = Recorded By, (t) = Taken By, (c) = Cosigned By    Initials Name Provider Type    GINGER Rae PT Physical Therapist                  Exercises       12/21/17 1400          Subjective Comments    Subjective Comments I haven't gone to [employer] in over 2 months because of my pain  -      Subjective Pain    Able to rate subjective pain? yes  -KH      Pre-Treatment Pain Level 7  -KH      Post-Treatment Pain Level 7  -KH      Exercise 1    Exercise Name 1 reviewed exercises in HEP- See copy in chart for details  -        User Key  (r) = Recorded By, (t) = Taken By, (c) = Cosigned By    Initials Name Provider Type    GINGER Rae PT Physical Therapist           Outcome Measure Options: 30 Second Chair Stand Test  30 Second Chair Stand Test  30 Second Chair Stand Test: 2.5 (excessive use of hands)  Modified Oswestry  Modified Oswestry Score/Comments: 70% disability      Time Calculation:   Start Time: 1450  Stop Time: 1530  Time Calculation (min): 40 min     Therapy Charges for Today     Code Description Service Date Service Provider Modifiers Qty    94436255011 HC PT THER PROC EA 15 MIN 12/21/2017 Chely Rae, PT GP 1    81445353938 HC PT EVAL LOW COMPLEXITY 2 12/21/2017 Chely Rae PT GP 1    06379409176 HC PT MOBILITY CURRENT 12/21/2017 Chely Rae, MARIA D GP,   1    [id number]  PT MOBILITY PROJECTED 12/21/2017 Chely Rae, PT GP, CL 1          PT G-Codes  PT Professional Judgement Used?: Yes  Outcome Measure Options: 30 Second Chair Stand Test  Score: 2.5 stands  Functional Limitation: Mobility: Walking and moving around  Mobility: Walking and Moving Around Current Status (): At least 80 percent but less than 100 percent impaired, limited or restricted  Mobility: Walking and Moving Around Goal Status (): At least 60 percent but less than 80 percent impaired, limited or restricted         Chely Rae, PT  12/21/2017

## 2017-12-24 ENCOUNTER — APPOINTMENT (OUTPATIENT)
Dept: GENERAL RADIOLOGY | Facility: HOSPITAL | Age: 82
End: 2017-12-24

## 2017-12-24 ENCOUNTER — HOSPITAL ENCOUNTER (OUTPATIENT)
Facility: HOSPITAL | Age: 82
Setting detail: OBSERVATION
Discharge: HOME | End: 2017-12-26
Attending: EMERGENCY MEDICINE | Admitting: INTERNAL MEDICINE

## 2017-12-24 DIAGNOSIS — R07.2 PRECORDIAL PAIN: Primary | ICD-10-CM

## 2017-12-24 DIAGNOSIS — I48.0 PAROXYSMAL ATRIAL FIBRILLATION (HCC): ICD-10-CM

## 2017-12-24 LAB
ALBUMIN SERPL-MCNC: 3.5 G/DL (ref 3.5–5.2)
ALBUMIN/GLOB SERPL: 0.9 G/DL
ALP SERPL-CCNC: 64 U/L (ref 39–117)
ALT SERPL W P-5'-P-CCNC: 13 U/L (ref 1–33)
ANION GAP SERPL CALCULATED.3IONS-SCNC: 9.8 MMOL/L
AST SERPL-CCNC: 20 U/L (ref 1–32)
BASOPHILS # BLD AUTO: 0.01 10*3/MM3 (ref 0–0.2)
BASOPHILS NFR BLD AUTO: 0.3 % (ref 0–1.5)
BILIRUB SERPL-MCNC: 1.1 MG/DL (ref 0.1–1.2)
BUN BLD-MCNC: 18 MG/DL (ref 8–23)
BUN/CREAT SERPL: 19.4 (ref 7–25)
CALCIUM SPEC-SCNC: 9.9 MG/DL (ref 8.6–10.5)
CHLORIDE SERPL-SCNC: 103 MMOL/L (ref 98–107)
CO2 SERPL-SCNC: 28.2 MMOL/L (ref 22–29)
CREAT BLD-MCNC: 0.93 MG/DL (ref 0.57–1)
DEPRECATED RDW RBC AUTO: 57.6 FL (ref 37–54)
EOSINOPHIL # BLD AUTO: 0 10*3/MM3 (ref 0–0.7)
EOSINOPHIL NFR BLD AUTO: 0 % (ref 0.3–6.2)
ERYTHROCYTE [DISTWIDTH] IN BLOOD BY AUTOMATED COUNT: 15.4 % (ref 11.7–13)
GFR SERPL CREATININE-BSD FRML MDRD: 70 ML/MIN/1.73
GLOBULIN UR ELPH-MCNC: 3.7 GM/DL
GLUCOSE BLD-MCNC: 103 MG/DL (ref 65–99)
HCT VFR BLD AUTO: 38.2 % (ref 35.6–45.5)
HGB BLD-MCNC: 12.3 G/DL (ref 11.9–15.5)
HOLD SPECIMEN: NORMAL
IMM GRANULOCYTES # BLD: 0 10*3/MM3 (ref 0–0.03)
IMM GRANULOCYTES NFR BLD: 0 % (ref 0–0.5)
INR PPP: 2.66 (ref 0.9–1.1)
INR PPP: 2.68 (ref 0.9–1.1)
LYMPHOCYTES # BLD AUTO: 1.81 10*3/MM3 (ref 0.9–4.8)
LYMPHOCYTES NFR BLD AUTO: 48.9 % (ref 19.6–45.3)
MCH RBC QN AUTO: 32.7 PG (ref 26.9–32)
MCHC RBC AUTO-ENTMCNC: 32.2 G/DL (ref 32.4–36.3)
MCV RBC AUTO: 101.6 FL (ref 80.5–98.2)
MONOCYTES # BLD AUTO: 0.32 10*3/MM3 (ref 0.2–1.2)
MONOCYTES NFR BLD AUTO: 8.6 % (ref 5–12)
NEUTROPHILS # BLD AUTO: 1.56 10*3/MM3 (ref 1.9–8.1)
NEUTROPHILS NFR BLD AUTO: 42.2 % (ref 42.7–76)
PLATELET # BLD AUTO: 154 10*3/MM3 (ref 140–500)
PMV BLD AUTO: 9.4 FL (ref 6–12)
POTASSIUM BLD-SCNC: 4.2 MMOL/L (ref 3.5–5.2)
PROT SERPL-MCNC: 7.2 G/DL (ref 6–8.5)
PROTHROMBIN TIME: 27.5 SECONDS (ref 11.7–14.2)
PROTHROMBIN TIME: 27.7 SECONDS (ref 11.7–14.2)
RBC # BLD AUTO: 3.76 10*6/MM3 (ref 3.9–5.2)
SODIUM BLD-SCNC: 141 MMOL/L (ref 136–145)
TROPONIN T SERPL-MCNC: <0.01 NG/ML (ref 0–0.03)
WBC NRBC COR # BLD: 3.7 10*3/MM3 (ref 4.5–10.7)

## 2017-12-24 PROCEDURE — 85025 COMPLETE CBC W/AUTO DIFF WBC: CPT | Performed by: EMERGENCY MEDICINE

## 2017-12-24 PROCEDURE — 80053 COMPREHEN METABOLIC PANEL: CPT | Performed by: EMERGENCY MEDICINE

## 2017-12-24 PROCEDURE — 84484 ASSAY OF TROPONIN QUANT: CPT | Performed by: EMERGENCY MEDICINE

## 2017-12-24 PROCEDURE — G0378 HOSPITAL OBSERVATION PER HR: HCPCS

## 2017-12-24 PROCEDURE — 93010 ELECTROCARDIOGRAM REPORT: CPT | Performed by: INTERNAL MEDICINE

## 2017-12-24 PROCEDURE — 71020 HC CHEST PA AND LATERAL: CPT

## 2017-12-24 PROCEDURE — 93005 ELECTROCARDIOGRAM TRACING: CPT

## 2017-12-24 PROCEDURE — 85610 PROTHROMBIN TIME: CPT | Performed by: EMERGENCY MEDICINE

## 2017-12-24 PROCEDURE — 85610 PROTHROMBIN TIME: CPT | Performed by: INTERNAL MEDICINE

## 2017-12-24 PROCEDURE — 99285 EMERGENCY DEPT VISIT HI MDM: CPT

## 2017-12-24 RX ORDER — FUROSEMIDE 40 MG/1
40 TABLET ORAL DAILY
Status: DISCONTINUED | OUTPATIENT
Start: 2017-12-24 | End: 2017-12-26 | Stop reason: HOSPADM

## 2017-12-24 RX ORDER — SODIUM CHLORIDE 0.9 % (FLUSH) 0.9 %
10 SYRINGE (ML) INJECTION AS NEEDED
Status: DISCONTINUED | OUTPATIENT
Start: 2017-12-24 | End: 2017-12-26 | Stop reason: HOSPADM

## 2017-12-24 RX ORDER — NITROGLYCERIN 0.4 MG/1
0.4 TABLET SUBLINGUAL
Status: DISCONTINUED | OUTPATIENT
Start: 2017-12-24 | End: 2017-12-26 | Stop reason: HOSPADM

## 2017-12-24 RX ORDER — PANTOPRAZOLE SODIUM 40 MG/1
40 TABLET, DELAYED RELEASE ORAL EVERY MORNING
Status: DISCONTINUED | OUTPATIENT
Start: 2017-12-25 | End: 2017-12-26 | Stop reason: HOSPADM

## 2017-12-24 RX ORDER — WARFARIN SODIUM 5 MG/1
5 TABLET ORAL
Status: DISCONTINUED | OUTPATIENT
Start: 2017-12-24 | End: 2017-12-25

## 2017-12-24 RX ORDER — LORAZEPAM 1 MG/1
1 TABLET ORAL EVERY 6 HOURS PRN
Status: DISCONTINUED | OUTPATIENT
Start: 2017-12-24 | End: 2017-12-26 | Stop reason: HOSPADM

## 2017-12-24 RX ORDER — SODIUM CHLORIDE 0.9 % (FLUSH) 0.9 %
1-10 SYRINGE (ML) INJECTION AS NEEDED
Status: DISCONTINUED | OUTPATIENT
Start: 2017-12-24 | End: 2017-12-26 | Stop reason: HOSPADM

## 2017-12-24 RX ORDER — GABAPENTIN 300 MG/1
300 CAPSULE ORAL EVERY 8 HOURS SCHEDULED
Status: DISCONTINUED | OUTPATIENT
Start: 2017-12-24 | End: 2017-12-26 | Stop reason: HOSPADM

## 2017-12-24 RX ORDER — ACETAMINOPHEN 325 MG/1
650 TABLET ORAL EVERY 4 HOURS PRN
Status: DISCONTINUED | OUTPATIENT
Start: 2017-12-24 | End: 2017-12-26 | Stop reason: HOSPADM

## 2017-12-24 RX ORDER — MESALAMINE 1.2 G/1
4800 TABLET, DELAYED RELEASE ORAL DAILY
Status: DISCONTINUED | OUTPATIENT
Start: 2017-12-24 | End: 2017-12-26 | Stop reason: HOSPADM

## 2017-12-24 RX ADMIN — ACETAMINOPHEN 650 MG: 325 TABLET, FILM COATED ORAL at 20:43

## 2017-12-24 RX ADMIN — WARFARIN SODIUM 5 MG: 5 TABLET ORAL at 20:43

## 2017-12-24 RX ADMIN — NITROGLYCERIN 0.5 INCH: 20 OINTMENT TOPICAL at 16:31

## 2017-12-24 RX ADMIN — FUROSEMIDE 40 MG: 40 TABLET ORAL at 20:43

## 2017-12-25 LAB
CHOLEST SERPL-MCNC: 141 MG/DL (ref 0–200)
HDLC SERPL-MCNC: 64 MG/DL (ref 40–60)
INR PPP: 2.94 (ref 0.9–1.1)
LDLC SERPL CALC-MCNC: 66 MG/DL (ref 0–100)
LDLC/HDLC SERPL: 1.03 {RATIO}
PROTHROMBIN TIME: 29.8 SECONDS (ref 11.7–14.2)
TRIGL SERPL-MCNC: 55 MG/DL (ref 0–150)
TROPONIN T SERPL-MCNC: <0.01 NG/ML (ref 0–0.03)
VLDLC SERPL-MCNC: 11 MG/DL (ref 5–40)

## 2017-12-25 PROCEDURE — 99220 PR INITIAL OBSERVATION CARE/DAY 70 MINUTES: CPT | Performed by: INTERNAL MEDICINE

## 2017-12-25 PROCEDURE — 85610 PROTHROMBIN TIME: CPT | Performed by: INTERNAL MEDICINE

## 2017-12-25 PROCEDURE — G0378 HOSPITAL OBSERVATION PER HR: HCPCS

## 2017-12-25 PROCEDURE — 80061 LIPID PANEL: CPT | Performed by: INTERNAL MEDICINE

## 2017-12-25 PROCEDURE — 93010 ELECTROCARDIOGRAM REPORT: CPT | Performed by: INTERNAL MEDICINE

## 2017-12-25 PROCEDURE — 93005 ELECTROCARDIOGRAM TRACING: CPT | Performed by: INTERNAL MEDICINE

## 2017-12-25 PROCEDURE — 84484 ASSAY OF TROPONIN QUANT: CPT | Performed by: INTERNAL MEDICINE

## 2017-12-25 RX ORDER — DEXTROSE MONOHYDRATE 25 G/50ML
25 INJECTION, SOLUTION INTRAVENOUS
Status: CANCELLED | OUTPATIENT
Start: 2017-12-25

## 2017-12-25 RX ORDER — WARFARIN SODIUM 4 MG/1
4 TABLET ORAL
Status: DISCONTINUED | OUTPATIENT
Start: 2017-12-25 | End: 2017-12-26 | Stop reason: HOSPADM

## 2017-12-25 RX ORDER — NICOTINE POLACRILEX 4 MG
15 LOZENGE BUCCAL
Status: CANCELLED | OUTPATIENT
Start: 2017-12-25

## 2017-12-25 RX ADMIN — GABAPENTIN 300 MG: 300 CAPSULE ORAL at 13:57

## 2017-12-25 RX ADMIN — WARFARIN SODIUM 4 MG: 4 TABLET ORAL at 17:45

## 2017-12-25 RX ADMIN — GABAPENTIN 300 MG: 300 CAPSULE ORAL at 06:37

## 2017-12-25 RX ADMIN — FUROSEMIDE 40 MG: 40 TABLET ORAL at 08:57

## 2017-12-25 RX ADMIN — PANTOPRAZOLE SODIUM 40 MG: 40 TABLET, DELAYED RELEASE ORAL at 06:37

## 2017-12-25 RX ADMIN — ACETAMINOPHEN 650 MG: 325 TABLET, FILM COATED ORAL at 21:08

## 2017-12-25 RX ADMIN — GABAPENTIN 300 MG: 300 CAPSULE ORAL at 21:08

## 2017-12-25 RX ADMIN — GABAPENTIN 300 MG: 300 CAPSULE ORAL at 00:11

## 2017-12-25 RX ADMIN — MESALAMINE 4.8 G: 1.2 TABLET, DELAYED RELEASE ORAL at 08:57

## 2017-12-26 ENCOUNTER — APPOINTMENT (OUTPATIENT)
Dept: CARDIOLOGY | Facility: HOSPITAL | Age: 82
End: 2017-12-26

## 2017-12-26 ENCOUNTER — HOSPITAL ENCOUNTER (OUTPATIENT)
Dept: CARDIOLOGY | Facility: HOSPITAL | Age: 82
Discharge: HOME | End: 2017-12-26
Attending: INTERNAL MEDICINE | Admitting: INTERNAL MEDICINE

## 2017-12-26 ENCOUNTER — APPOINTMENT (OUTPATIENT)
Dept: PHYSICAL THERAPY | Facility: HOSPITAL | Age: 82
End: 2017-12-26

## 2017-12-26 VITALS
WEIGHT: 243 LBS | DIASTOLIC BLOOD PRESSURE: 74 MMHG | BODY MASS INDEX: 39.05 KG/M2 | TEMPERATURE: 98 F | HEIGHT: 66 IN | OXYGEN SATURATION: 95 % | SYSTOLIC BLOOD PRESSURE: 116 MMHG | RESPIRATION RATE: 16 BRPM | HEART RATE: 62 BPM

## 2017-12-26 DIAGNOSIS — R07.2 PRECORDIAL PAIN: ICD-10-CM

## 2017-12-26 LAB
BH CV NUCLEAR PRIOR STUDY: 2
BH CV STRESS COMMENTS STAGE 1: NORMAL
BH CV STRESS DOSE REGADENOSON STAGE 1: 0.4
BH CV STRESS DURATION MIN STAGE 1: 0
BH CV STRESS DURATION SEC STAGE 1: 15
BH CV STRESS HR STAGE 1: 81
BH CV STRESS PROTOCOL 1: NORMAL
BH CV STRESS RECOVERY BP: NORMAL MMHG
BH CV STRESS RECOVERY HR: 78 BPM
BH CV STRESS STAGE 1: 1
INR PPP: 2.97 (ref 0.9–1.1)
LV EF NUC BP: 54 %
MAXIMAL PREDICTED HEART RATE: 138 BPM
PERCENT MAX PREDICTED HR: 58.7 %
PROTHROMBIN TIME: 29.9 SECONDS (ref 11.7–14.2)
STRESS BASELINE BP: NORMAL MMHG
STRESS BASELINE HR: 67 BPM
STRESS PERCENT HR: 69 %
STRESS POST EXERCISE DUR SEC: 15 SEC
STRESS POST PEAK HR: 81 BPM
STRESS TARGET HR: 117 BPM

## 2017-12-26 PROCEDURE — G0378 HOSPITAL OBSERVATION PER HR: HCPCS

## 2017-12-26 PROCEDURE — 25010000002 REGADENOSON 0.4 MG/5ML SOLUTION: Performed by: INTERNAL MEDICINE

## 2017-12-26 PROCEDURE — 0296T HC EXT ECG > 48HR TO 21 DAY RCRD W/CONECT INTL RCRD: CPT

## 2017-12-26 PROCEDURE — 93016 CV STRESS TEST SUPVJ ONLY: CPT | Performed by: INTERNAL MEDICINE

## 2017-12-26 PROCEDURE — 93018 CV STRESS TEST I&R ONLY: CPT | Performed by: INTERNAL MEDICINE

## 2017-12-26 PROCEDURE — A9555 RB82 RUBIDIUM: HCPCS | Performed by: INTERNAL MEDICINE

## 2017-12-26 PROCEDURE — 78492 MYOCRD IMG PET MLT RST&STRS: CPT

## 2017-12-26 PROCEDURE — 99217 PR OBSERVATION CARE DISCHARGE MANAGEMENT: CPT | Performed by: NURSE PRACTITIONER

## 2017-12-26 PROCEDURE — 78492 MYOCRD IMG PET MLT RST&STRS: CPT | Performed by: INTERNAL MEDICINE

## 2017-12-26 PROCEDURE — 0 RUBIDIUM CHLORIDE: Performed by: INTERNAL MEDICINE

## 2017-12-26 PROCEDURE — 93017 CV STRESS TEST TRACING ONLY: CPT

## 2017-12-26 PROCEDURE — 25010000002 REGADENOSON 0.4 MG/5ML SOLUTION

## 2017-12-26 PROCEDURE — 85610 PROTHROMBIN TIME: CPT | Performed by: INTERNAL MEDICINE

## 2017-12-26 RX ADMIN — ACETAMINOPHEN 650 MG: 325 TABLET, FILM COATED ORAL at 08:51

## 2017-12-26 RX ADMIN — GABAPENTIN 300 MG: 300 CAPSULE ORAL at 14:01

## 2017-12-26 RX ADMIN — MESALAMINE 4.8 G: 1.2 TABLET, DELAYED RELEASE ORAL at 10:45

## 2017-12-26 RX ADMIN — RUBIDIUM CHLORIDE RB-82 1 DOSE: 150 INJECTION, SOLUTION INTRAVENOUS at 09:41

## 2017-12-26 RX ADMIN — FUROSEMIDE 40 MG: 40 TABLET ORAL at 10:46

## 2017-12-26 RX ADMIN — REGADENOSON 0.4 MG: 0.08 INJECTION, SOLUTION INTRAVENOUS at 09:41

## 2017-12-26 RX ADMIN — RUBIDIUM CHLORIDE RB-82 1 DOSE: 150 INJECTION, SOLUTION INTRAVENOUS at 09:26

## 2017-12-28 ENCOUNTER — APPOINTMENT (OUTPATIENT)
Dept: PHYSICAL THERAPY | Facility: HOSPITAL | Age: 82
End: 2017-12-28

## 2018-01-02 ENCOUNTER — OFFICE VISIT (OUTPATIENT)
Dept: FAMILY MEDICINE CLINIC | Facility: CLINIC | Age: 83
End: 2018-01-02

## 2018-01-02 ENCOUNTER — TELEPHONE (OUTPATIENT)
Dept: CARDIOLOGY | Facility: CLINIC | Age: 83
End: 2018-01-02

## 2018-01-02 VITALS
TEMPERATURE: 97.5 F | BODY MASS INDEX: 39.58 KG/M2 | HEIGHT: 66 IN | SYSTOLIC BLOOD PRESSURE: 128 MMHG | DIASTOLIC BLOOD PRESSURE: 68 MMHG | WEIGHT: 246.3 LBS | OXYGEN SATURATION: 96 % | HEART RATE: 75 BPM

## 2018-01-02 DIAGNOSIS — M54.50 CHRONIC MIDLINE LOW BACK PAIN WITHOUT SCIATICA: ICD-10-CM

## 2018-01-02 DIAGNOSIS — E05.90 HYPERTHYROIDISM: ICD-10-CM

## 2018-01-02 DIAGNOSIS — I48.0 PAROXYSMAL ATRIAL FIBRILLATION (HCC): Primary | ICD-10-CM

## 2018-01-02 DIAGNOSIS — G89.29 CHRONIC MIDLINE LOW BACK PAIN WITHOUT SCIATICA: ICD-10-CM

## 2018-01-02 PROBLEM — L90.0 LICHEN SCLEROSUS: Status: ACTIVE | Noted: 2017-08-23

## 2018-01-02 PROBLEM — R07.2 PRECORDIAL PAIN: Status: RESOLVED | Noted: 2017-12-24 | Resolved: 2018-01-02

## 2018-01-02 PROCEDURE — 99214 OFFICE O/P EST MOD 30 MIN: CPT | Performed by: FAMILY MEDICINE

## 2018-01-02 RX ORDER — GABAPENTIN 300 MG/1
300 CAPSULE ORAL
Qty: 270 CAPSULE | Refills: 0
Start: 2018-01-02 | End: 2018-05-04 | Stop reason: SDUPTHER

## 2018-01-02 RX ORDER — FUROSEMIDE 20 MG/1
40 TABLET ORAL DAILY
Qty: 270 TABLET | Refills: 3
Start: 2018-01-02 | End: 2018-01-26 | Stop reason: SDUPTHER

## 2018-01-02 NOTE — TELEPHONE ENCOUNTER
Gregg Esparza,  Unfortunately, I have not seen this patient myself in the past 1-1/2 years but am scheduled to see her on 1/26/2018.  I will  address the concerns she has at that time. Thank you for sharing your concerns. Please let me know if you think she needs to be seen earlier.  Mary Grace Culp

## 2018-01-02 NOTE — PROGRESS NOTES
Subjective   Brittany Villeda is a 82 y.o. female.     Chief Complaint   Patient presents with   • [facility] Care     trans pt,    • Shortness of Breath     chest pain follow up hospital visit 12/24/17 got out on 12-26-17         History of Present Illness    Recent hospitalization.  Admitted over [place].  For 2 days.  Present with chest pain.  She ruled out for myocardial infarction.  She had a PET stress test that was essentially normal with normal LV function and no evidence of ischemia.  Since the hospitalization she describes no chest pain or dizziness.    Back pain.  Long-standing.  Chronic low back pain.  Previous sciatica.  She takes gabapentin 300 mg 3 times a day.  If she doesn't take it she states perhaps she has more pain.  The pain is a nuisance sometimes, other times a problem.  She's had no sciatica symptoms in some time.    Paroxysmal atrial fibrillation.  She was diagnosed with this in 2013 .  I one mentioned on the chart.  She has a ZIO patch on from previous hospitals age.  She continues the warfarin.  INR monitored by cardiology.    On Lasix.  Cause unknown.  She has normal systolic function.  She does have diastolic dysfunction.  She has pulmonary hypertension reportedly.  She takes 2 Lasix in the morning one at nighttime.  She states she takes Lasix for summertime leg swelling.  She states she has no history of hypertension.  Her blood pressure she states is always been normal.  She checks at home daily.  Runs about 120s systolic with normal diastolic.    Hyperthyroidism noted on the chart.  TSH was suppressed with normal T4.  She went to a endocrinologist.  Reportedly no further treatment was needed.          The following portions of the patient's history were reviewed and updated as appropriate: allergies, current medications, past family history, past medical history, past social history, past surgical history and problem list.          Review of Systems   Constitutional: Negative.   "  Respiratory: Negative.    Cardiovascular: Negative.    Musculoskeletal: Positive for back pain.   Neurological: Negative.    Psychiatric/Behavioral: Negative.  Negative for confusion and dysphoric mood.       Objective   Blood pressure 128/68, pulse 75, temperature 97.5 °F (36.4 °C), temperature source Oral, height 167.6 cm (65.98\"), weight 112 kg (246 lb 4.8 oz), SpO2 96 %.  Physical Exam   Constitutional: She appears well-developed and well-nourished. No distress.   Neck: No thyromegaly present.   Cardiovascular: Normal rate, regular rhythm, normal heart sounds and intact distal pulses.    Pulmonary/Chest: Effort normal and breath sounds normal.   Musculoskeletal: She exhibits no edema.   Skin: Skin is warm and dry.   Psychiatric: She has a normal mood and affect. Her behavior is normal. Judgment and thought content normal.   Nursing note and vitals reviewed.      Assessment/Plan   Brittany was seen today for establish care and shortness of breath.    Diagnoses and all orders for this visit:    Paroxysmal atrial fibrillation  -     TSH Rfx On Abnormal To Free T4  -     Basic Metabolic Panel    Hyperthyroidism  -     TSH Rfx On Abnormal To Free T4    Chronic midline low back pain without sciatica    Other orders  -     gabapentin (NEURONTIN) 300 MG capsule; Take 1 capsule by mouth 2 (Two) Times a Day.  -     furosemide (LASIX) 20 MG tablet; Take 2 tablets by mouth Daily.    Chronic low back pain.  I recommend she decrease the gabapentin from 300 mg 3 times a day down to 300 mg twice a day.  We will see if she has any changes in her back symptoms.  I am concerned about some dry mouth symptoms she is describing.  The gabapentin may increase her risk for falls.    Subclinical hyperthyroidism?.  Rechecking TSH and T4 today.  Reportedly she saw endocrinologist with no further action needed.    Paroxysmal atrial fibrillation.  Patient has a ZIO patch in place.  She'll be seeing her cardiologist end of this month.  Only " episode I can see of atrial ablation was in 2013.  She is on no beta blockers.  She continues warfarin.  I'm concerned about the risk of warfarin with regards to fall and bleeding.  Defer to cardiology for further or friend treatment.    Dependent edema.  None on examination today.  Typically occurs during the summer.  She is on Lasix 2 tablets in the morning one tablet at nighttime.  The nighttime tablet especially is causing urinary issues.  Recommend she decrease the Lasix to 2 tablets the morning only.  She will monitor blood pressure at home.  Also because of her diastolic dysfunction, would recommend less diuretics.    Follow-up in 6 weeks for recheck.  Sooner as needed.

## 2018-01-02 NOTE — TELEPHONE ENCOUNTER
----- Message from Mega Esparza MD sent at 1/2/2018  3:30 PM EST -----  Dr Culp,    I am seeing Ms Villeda for the first time today.  Her previous family doctor has retired.  I see she was diagnosed with paroxysmal atrial fibrillation 2013 and has been on warfarin since.  I don't see any further evidence of atrial fibrillation documented. At hospital discharge recently, Zio patch has been placed and results are pending.  She is going to see you within the next few weeks.  I also see she was recently admitted for chest pain over [place], with negative PET stress test.  Looking back, she had a similar negative PET stress test in 2015.     Question: If the Zio patch is negative, what your thoughts about stopping the warfarin?  She lives alone.  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    I'm also going to reduce her Lasix somewhat.  She is having some urinary symptoms from it.  She takes it primarily I believe for summertime leg swelling.  She has normal LV function systolic function, with previous documented diastolic dysfunction.  Also diagnosed with pulmonary hypertension.  There is a diagnosis of hypertension on the chart, however she checks her blood pressure at home and it is consistently normal.    Rafia Esparza M.D.

## 2018-01-03 LAB
BUN SERPL-MCNC: 24 MG/DL (ref 8–23)
BUN/CREAT SERPL: 23.3 (ref 7–25)
CALCIUM SERPL-MCNC: 10.3 MG/DL (ref 8.6–10.5)
CHLORIDE SERPL-SCNC: 99 MMOL/L (ref 98–107)
CO2 SERPL-SCNC: 32.3 MMOL/L (ref 22–29)
CREAT SERPL-MCNC: 1.03 MG/DL (ref 0.57–1)
GLUCOSE SERPL-MCNC: 94 MG/DL (ref 65–99)
POTASSIUM SERPL-SCNC: 3.7 MMOL/L (ref 3.5–5.2)
SODIUM SERPL-SCNC: 141 MMOL/L (ref 136–145)
TSH SERPL DL<=0.005 MIU/L-ACNC: 0.28 MIU/ML (ref 0.27–4.2)

## 2018-01-03 NOTE — TELEPHONE ENCOUNTER
Mini,    No.  She can definitely wait to see you.  I knew you had not seen her in some time.      Mega

## 2018-01-04 ENCOUNTER — HOSPITAL ENCOUNTER (OUTPATIENT)
Dept: PHYSICAL THERAPY | Facility: HOSPITAL | Age: 83
Setting detail: THERAPIES SERIES
Discharge: HOME | End: 2018-01-04

## 2018-01-04 DIAGNOSIS — M25.551 RIGHT HIP PAIN: ICD-10-CM

## 2018-01-04 DIAGNOSIS — M51.36 DDD (DEGENERATIVE DISC DISEASE), LUMBAR: Primary | ICD-10-CM

## 2018-01-04 PROCEDURE — 97113 AQUATIC THERAPY/EXERCISES: CPT | Performed by: PHYSICAL THERAPIST

## 2018-01-04 NOTE — THERAPY TREATMENT NOTE
Outpatient Physical Therapy Ortho Treatment Note  [facility]     Patient Name: Brittany Villeda  : 1935  MRN: [id number]  Today's Date: 2018      Visit Date: 2018    Visit Dx:    ICD-10-CM ICD-9-CM   1. DDD (degenerative disc disease), lumbar M51.36 722.52   2. Right hip pain M25.551 719.45       Patient Active Problem List   Diagnosis   • Sciatica   • Non-toxic multinodular goiter   • Chronic midline low back pain without sciatica   • Essential hypertension   • Gastroesophageal reflux disease without esophagitis   • Cataract   • Pulmonary hypertension   • Paroxysmal atrial fibrillation   • Diastolic dysfunction   • HUGO (obstructive sleep apnea)   • Trifascicular block   • Leukopenia   • MGUS (monoclonal gammopathy of unknown significance)   • Ulcerative rectosigmoiditis without complication   • Lichen sclerosus        Past Medical History:   Diagnosis Date   • Anemia    • Arthritis    • Asthma    • Colitis    • COPD (chronic obstructive pulmonary disease)    • Diastolic dysfunction    • Essential hypertension 2016   • GERD (gastroesophageal reflux disease)    • Hypertension    • Hypertensive heart disease    • Hyperthyroidism    • Kidney stone    • Low back pain    • MGUS (monoclonal gammopathy of unknown significance)    • Nephrolithiasis    • Obesity    • Paroxysmal atrial fibrillation    • Peptic ulceration    • Pulmonary hypertension    • Sleep apnea    • Ventricular tachycardia     nonsustained   • Vertigo         Past Surgical History:   Procedure Laterality Date   • CHOLECYSTECTOMY     • COLONOSCOPY  2014    colitis, cryptitis,  tics, NBIH, TA w/low grade dysplasia   • HEMORRHOIDECTOMY     • HYSTERECTOMY     • KNEE ARTHROPLASTY     • MYOMECTOMY     • SHOULDER SURGERY     • SINUS SURGERY     • TONSILLECTOMY                               PT Assessment/Plan       18 1121       PT Assessment    Assessment Comments Patient wearing heart halter after last week’s  hospitalization for new onset heart problems for further evaluation. She felt R lateral hip pain walking in water and with hip abduction ROM. She needed assistance in/out pool via stairs. She is very motivated to get better.  -PB       User Key  (r) = Recorded By, (t) = Taken By, (c) = Cosigned By    Initials Name Provider Type    KETTY Noel PT Physical Therapist                    Exercises       01/04/18 0900          Subjective Comments    Subjective Comments My hip is bothering me more than my back  -PB      Subjective Pain    Able to rate subjective pain? yes  -PB      Pre-Treatment Pain Level 7  -PB      Post-Treatment Pain Level 7  -PB      Aquatics    Aquatics performed? Yes  -PB      Aquatics LE    Water Walk forward;side   At rail trying not to hold  -PB      Abdominals noodle   SN x 10  -PB      Hip Abd/Add 10 B limited ROM R  -PB      Hip Flex/Ext 10 B  -PB      March in Place 10  -PB      Toe/Heel Raises 15  -PB      Uni-Squat Small hip circles 10/2 B   -PB      Bicycle Seated 2 min  -PB      Flutter/Scissor Seated x 10 each   -PB      Exercise 1    Exercise Name 1 Patient entered and exited warm pool non reciprocally with rail and assist from therapist on other side.  -PB        User Key  (r) = Recorded By, (t) = Taken By, (c) = Cosigned By    Initials Name Provider Type    KETTY Noel PT Physical Therapist                                            Time Calculation:   Start Time: 0945  Stop Time: 1025  Time Calculation (min): 40 min    Therapy Charges for Today     Code Description Service Date Service Provider Modifiers Qty    [id number]  PT AQUATIC THERAPY EA 15 MIN 1/4/2018 Rosemary Noel, PT GP 3                    Rosemary Noel PT  1/4/2018

## 2018-01-05 ENCOUNTER — TRANSCRIBE ORDERS (OUTPATIENT)
Dept: FAMILY MEDICINE CLINIC | Facility: CLINIC | Age: 83
End: 2018-01-05

## 2018-01-05 DIAGNOSIS — Z12.31 VISIT FOR SCREENING MAMMOGRAM: Primary | ICD-10-CM

## 2018-01-11 ENCOUNTER — HOSPITAL ENCOUNTER (OUTPATIENT)
Dept: CARDIOLOGY | Facility: HOSPITAL | Age: 83
Setting detail: RECURRING SERIES
Discharge: HOME | End: 2018-01-11

## 2018-01-11 ENCOUNTER — HOSPITAL ENCOUNTER (OUTPATIENT)
Dept: PHYSICAL THERAPY | Facility: HOSPITAL | Age: 83
Setting detail: THERAPIES SERIES
Discharge: HOME | End: 2018-01-11

## 2018-01-11 DIAGNOSIS — M51.36 DDD (DEGENERATIVE DISC DISEASE), LUMBAR: Primary | ICD-10-CM

## 2018-01-11 DIAGNOSIS — M25.551 RIGHT HIP PAIN: ICD-10-CM

## 2018-01-11 PROCEDURE — 36416 COLLJ CAPILLARY BLOOD SPEC: CPT

## 2018-01-11 PROCEDURE — 85610 PROTHROMBIN TIME: CPT

## 2018-01-11 PROCEDURE — 97113 AQUATIC THERAPY/EXERCISES: CPT | Performed by: PHYSICAL THERAPIST

## 2018-01-11 NOTE — THERAPY TREATMENT NOTE
Outpatient Physical Therapy Ortho Treatment Note  [facility]     Patient Name: Brittany Villeda  : 1935  MRN: [id number]  Today's Date: 2018      Visit Date: 2018    Visit Dx:    ICD-10-CM ICD-9-CM   1. DDD (degenerative disc disease), lumbar M51.36 722.52   2. Right hip pain M25.551 719.45       Patient Active Problem List   Diagnosis   • Sciatica   • Non-toxic multinodular goiter   • Chronic midline low back pain without sciatica   • Essential hypertension   • Gastroesophageal reflux disease without esophagitis   • Cataract   • Pulmonary hypertension   • Paroxysmal atrial fibrillation   • Diastolic dysfunction   • HUGO (obstructive sleep apnea)   • Trifascicular block   • Leukopenia   • MGUS (monoclonal gammopathy of unknown significance)   • Ulcerative rectosigmoiditis without complication   • Lichen sclerosus        Past Medical History:   Diagnosis Date   • Anemia    • Arthritis    • Asthma    • Colitis    • COPD (chronic obstructive pulmonary disease)    • Diastolic dysfunction    • Essential hypertension 2016   • GERD (gastroesophageal reflux disease)    • Hypertension    • Hypertensive heart disease    • Hyperthyroidism    • Kidney stone    • Low back pain    • MGUS (monoclonal gammopathy of unknown significance)    • Nephrolithiasis    • Obesity    • Paroxysmal atrial fibrillation    • Peptic ulceration    • Pulmonary hypertension    • Sleep apnea    • Ventricular tachycardia     nonsustained   • Vertigo         Past Surgical History:   Procedure Laterality Date   • CHOLECYSTECTOMY     • COLONOSCOPY  2014    colitis, cryptitis,  tics, NBIH, TA w/low grade dysplasia   • HEMORRHOIDECTOMY     • HYSTERECTOMY     • KNEE ARTHROPLASTY     • MYOMECTOMY     • SHOULDER SURGERY     • SINUS SURGERY     • TONSILLECTOMY                               PT Assessment/Plan       18 1235       PT Assessment    Assessment Comments Brittany responded well to PT treatment with added  stretches, decompression and deep water exercises.  She reported no pain by end of treatment. CGA to enter/exit pool.  -HEMALATHA       User Key  (r) = Recorded By, (t) = Taken By, (c) = Cosigned By    Initials Name Provider Type    HEMALATHA Yin, PT Physical Therapist                    Exercises       01/11/18 0900          Subjective Pain    Able to rate subjective pain? yes  -HEMALATHA      Pre-Treatment Pain Level 4  -HEMALATHA      Post-Treatment Pain Level 0  -HEMALATHA      Subjective Pain Comment I did ok after first water session, I was sore.  -HEMALATHA      Aquatics    Aquatics performed? Yes  -HEMALATHA      Aquatics LE    Water Walk forward;side;backward   At rail trying not to hold, 5-6 min.(deeper)  -HEMALATHA      Stretch 1 Hamstrings 20 sec X 2 ea with therapist assist to support leg.  -HEMALATHA      Stretch 2 BKTC at rail with noodle support 20 sed. X 2  -HEMALATHA      Vertical Traction Decompression knees slightly bent X 1.5 min  -HEMALATHA      Abdominals noodle   LN X 15  -HEMALATHA      Clams Suspended X 15  -HEMALATHA      Hip Abd/Add 10 B limited ROM R, used deep water  -HEMALATHA      Hip Flex/Ext 10 B, deep water  -HEMALATHA      March in Place 10  -HEMALATHA      Toe/Heel Raises --  -HEMALATHA      Uni-Squat Small hip circles 10/2 B   -HEMALATHA      Bicycle Seated & suspended X 2 min ea.  -HEMALATHA      Flutter/Scissor Seated 15/15  -HEMALATHA        User Key  (r) = Recorded By, (t) = Taken By, (c) = Cosigned By    Initials Name Provider Type    HEMALATHA Yin, MARIA D Physical Therapist                             Therapy Education  Education Details: Recommended non-slip footwear for walking on pool deck and in locker room.  Given: Bandaging/dressing change  Program: Reinforced (Aquatic exercise)  How Provided: Verbal  Provided to: Patient  Level of Understanding: Teach back education performed              Time Calculation:   Start Time: 0950  Stop Time: 1030  Time Calculation (min): 40 min    Therapy Charges for Today     Code Description Service Date Service Provider Modifiers Qty    99159398706  PT AQUATIC  THERAPY EA 15 MIN 1/11/2018 Dima Yin, PT GP 3                    Dima Yin, PT  1/11/2018

## 2018-01-15 ENCOUNTER — LAB (OUTPATIENT)
Dept: LAB | Facility: HOSPITAL | Age: 83
End: 2018-01-15

## 2018-01-15 DIAGNOSIS — R71.8 OTHER ABNORMALITY OF RED BLOOD CELLS: ICD-10-CM

## 2018-01-15 DIAGNOSIS — D70.8 OTHER NEUTROPENIA (HCC): ICD-10-CM

## 2018-01-15 DIAGNOSIS — D47.2 MGUS (MONOCLONAL GAMMOPATHY OF UNKNOWN SIGNIFICANCE): ICD-10-CM

## 2018-01-15 LAB
ALBUMIN SERPL-MCNC: 4 G/DL (ref 3.5–5.2)
ALBUMIN/GLOB SERPL: 1.1 G/DL (ref 1.1–2.4)
ALP SERPL-CCNC: 63 U/L (ref 38–116)
ALT SERPL W P-5'-P-CCNC: 7 U/L (ref 0–33)
ANION GAP SERPL CALCULATED.3IONS-SCNC: 10.9 MMOL/L
AST SERPL-CCNC: 15 U/L (ref 0–32)
BASOPHILS # BLD AUTO: 0.02 10*3/MM3 (ref 0–0.1)
BASOPHILS NFR BLD AUTO: 0.6 % (ref 0–1.1)
BILIRUB SERPL-MCNC: 1 MG/DL (ref 0.1–1.2)
BUN BLD-MCNC: 16 MG/DL (ref 6–20)
BUN/CREAT SERPL: 19.3 (ref 7.3–30)
CALCIUM SPEC-SCNC: 10.3 MG/DL (ref 8.5–10.2)
CHLORIDE SERPL-SCNC: 105 MMOL/L (ref 98–107)
CO2 SERPL-SCNC: 28.1 MMOL/L (ref 22–29)
CREAT BLD-MCNC: 0.83 MG/DL (ref 0.6–1.1)
DEPRECATED RDW RBC AUTO: 55 FL (ref 37–49)
EOSINOPHIL # BLD AUTO: 0 10*3/MM3 (ref 0–0.36)
EOSINOPHIL NFR BLD AUTO: 0 % (ref 1–5)
ERYTHROCYTE [DISTWIDTH] IN BLOOD BY AUTOMATED COUNT: 14.7 % (ref 11.7–14.5)
FERRITIN SERPL-MCNC: 82.4 NG/ML
GFR SERPL CREATININE-BSD FRML MDRD: 80 ML/MIN/1.73
GLOBULIN UR ELPH-MCNC: 3.5 GM/DL (ref 1.8–3.5)
GLUCOSE BLD-MCNC: 101 MG/DL (ref 74–124)
HCT VFR BLD AUTO: 39.7 % (ref 34–45)
HGB BLD-MCNC: 12.9 G/DL (ref 11.5–14.9)
IMM GRANULOCYTES # BLD: 0 10*3/MM3 (ref 0–0.03)
IMM GRANULOCYTES NFR BLD: 0 % (ref 0–0.5)
IRON 24H UR-MRATE: 63 MCG/DL (ref 37–145)
IRON SATN MFR SERPL: 19 % (ref 14–48)
LYMPHOCYTES # BLD AUTO: 1.93 10*3/MM3 (ref 1–3.5)
LYMPHOCYTES NFR BLD AUTO: 59.9 % (ref 20–49)
MCH RBC QN AUTO: 32.6 PG (ref 27–33)
MCHC RBC AUTO-ENTMCNC: 32.5 G/DL (ref 32–35)
MCV RBC AUTO: 100.3 FL (ref 83–97)
MONOCYTES # BLD AUTO: 0.33 10*3/MM3 (ref 0.25–0.8)
MONOCYTES NFR BLD AUTO: 10.2 % (ref 4–12)
NEUTROPHILS # BLD AUTO: 0.94 10*3/MM3 (ref 1.5–7)
NEUTROPHILS NFR BLD AUTO: 29.3 % (ref 39–75)
NRBC BLD MANUAL-RTO: 0 /100 WBC (ref 0–0)
PLATELET # BLD AUTO: 177 10*3/MM3 (ref 150–375)
PMV BLD AUTO: 8.8 FL (ref 8.9–12.1)
POTASSIUM BLD-SCNC: 4.2 MMOL/L (ref 3.5–4.7)
PROT SERPL-MCNC: 7.5 G/DL (ref 6.3–8)
RBC # BLD AUTO: 3.96 10*6/MM3 (ref 3.9–5)
SODIUM BLD-SCNC: 144 MMOL/L (ref 134–145)
TIBC SERPL-MCNC: 336 MCG/DL (ref 249–505)
TRANSFERRIN SERPL-MCNC: 240 MG/DL (ref 200–360)
WBC NRBC COR # BLD: 3.22 10*3/MM3 (ref 4–10)

## 2018-01-15 PROCEDURE — 83540 ASSAY OF IRON: CPT | Performed by: INTERNAL MEDICINE

## 2018-01-15 PROCEDURE — 84466 ASSAY OF TRANSFERRIN: CPT | Performed by: INTERNAL MEDICINE

## 2018-01-15 PROCEDURE — 85025 COMPLETE CBC W/AUTO DIFF WBC: CPT | Performed by: INTERNAL MEDICINE

## 2018-01-15 PROCEDURE — 36415 COLL VENOUS BLD VENIPUNCTURE: CPT | Performed by: INTERNAL MEDICINE

## 2018-01-15 PROCEDURE — 80053 COMPREHEN METABOLIC PANEL: CPT

## 2018-01-15 PROCEDURE — 82728 ASSAY OF FERRITIN: CPT | Performed by: INTERNAL MEDICINE

## 2018-01-16 ENCOUNTER — APPOINTMENT (OUTPATIENT)
Dept: PHYSICAL THERAPY | Facility: HOSPITAL | Age: 83
End: 2018-01-16

## 2018-01-16 ENCOUNTER — TELEPHONE (OUTPATIENT)
Dept: CARDIOLOGY | Facility: CLINIC | Age: 83
End: 2018-01-16

## 2018-01-16 PROCEDURE — 0298T HOLTER MONITOR - 72 HOUR UP TO 21 DAY: CPT | Performed by: INTERNAL MEDICINE

## 2018-01-16 NOTE — TELEPHONE ENCOUNTER
----- Message from SHAHID Roberts sent at 1/16/2018  1:44 PM EST -----  Regarding: Zio      ----- Message -----     From: Brooke Faustin MD     Sent: 1/16/2018  10:49 AM       To: SHAHID Roberts

## 2018-01-16 NOTE — TELEPHONE ENCOUNTER
I talked to patient regarding Ziopatch results.  Patient is not consuming any alcohol and consumes only one caffeinated beverage a day.  Her blood pressure remained stable at 110-120 monos mercury systolic.  Will have her decrease stimulants as much as she is able to keep her appointment on February 26.  She has no symptoms at this time though did have symptoms when she had SVT as listed on the Zio patch.[email or web address]

## 2018-01-17 LAB
ALBUMIN SERPL-MCNC: 3.4 G/DL (ref 2.9–4.4)
ALBUMIN/GLOB SERPL: 0.9 {RATIO} (ref 0.7–1.7)
ALPHA1 GLOB FLD ELPH-MCNC: 0.3 G/DL (ref 0–0.4)
ALPHA2 GLOB SERPL ELPH-MCNC: 0.7 G/DL (ref 0.4–1)
B-GLOBULIN SERPL ELPH-MCNC: 1.6 G/DL (ref 0.7–1.3)
GAMMA GLOB SERPL ELPH-MCNC: 1.2 G/DL (ref 0.4–1.8)
GLOBULIN SER CALC-MCNC: 3.8 G/DL (ref 2.2–3.9)
IGA SERPL-MCNC: 714 MG/DL (ref 64–422)
IGG SERPL-MCNC: 1082 MG/DL (ref 700–1600)
IGM SERPL-MCNC: 62 MG/DL (ref 26–217)
INTERPRETATION SERPL IEP-IMP: ABNORMAL
KAPPA LC SERPL-MCNC: 36.1 MG/L (ref 3.3–19.4)
KAPPA LC/LAMBDA SER: 0.91 {RATIO} (ref 0.26–1.65)
LAMBDA LC FREE SERPL-MCNC: 39.8 MG/L (ref 5.7–26.3)
Lab: ABNORMAL
M-SPIKE: 0.4 G/DL
PROT SERPL-MCNC: 7.2 G/DL (ref 6–8.5)

## 2018-01-18 ENCOUNTER — HOSPITAL ENCOUNTER (OUTPATIENT)
Dept: PHYSICAL THERAPY | Facility: HOSPITAL | Age: 83
Setting detail: THERAPIES SERIES
Discharge: HOME | End: 2018-01-18

## 2018-01-18 DIAGNOSIS — M25.551 RIGHT HIP PAIN: ICD-10-CM

## 2018-01-18 DIAGNOSIS — M51.36 DDD (DEGENERATIVE DISC DISEASE), LUMBAR: Primary | ICD-10-CM

## 2018-01-18 PROCEDURE — 97113 AQUATIC THERAPY/EXERCISES: CPT | Performed by: PHYSICAL THERAPIST

## 2018-01-18 NOTE — THERAPY TREATMENT NOTE
Outpatient Physical Therapy Ortho Treatment Note  [facility]     Patient Name: Brittany Villeda  : 1935  MRN: [id number]  Today's Date: 2018      Visit Date: 2018    Visit Dx:    ICD-10-CM ICD-9-CM   1. DDD (degenerative disc disease), lumbar M51.36 722.52   2. Right hip pain M25.551 719.45       Patient Active Problem List   Diagnosis   • Sciatica   • Non-toxic multinodular goiter   • Chronic midline low back pain without sciatica   • Essential hypertension   • Gastroesophageal reflux disease without esophagitis   • Cataract   • Pulmonary hypertension   • Paroxysmal atrial fibrillation   • Diastolic dysfunction   • HUGO (obstructive sleep apnea)   • Trifascicular block   • Leukopenia   • MGUS (monoclonal gammopathy of unknown significance)   • Ulcerative rectosigmoiditis without complication   • Lichen sclerosus        Past Medical History:   Diagnosis Date   • Anemia    • Arthritis    • Asthma    • Chest pain    • Colitis    • COPD (chronic obstructive pulmonary disease)    • Diastolic dysfunction    • Essential hypertension 2016   • GERD (gastroesophageal reflux disease)    • Hypertension    • Hypertensive heart disease    • Hyperthyroidism    • Kidney stone    • Low back pain    • MGUS (monoclonal gammopathy of unknown significance)    • Nephrolithiasis    • Obesity    • Paroxysmal atrial fibrillation    • Peptic ulceration    • Pulmonary hypertension    • Sleep apnea    • Ventricular tachycardia     nonsustained   • Vertigo         Past Surgical History:   Procedure Laterality Date   • CHOLECYSTECTOMY     • COLONOSCOPY  2014    colitis, cryptitis,  tics, NBIH, TA w/low grade dysplasia   • HEMORRHOIDECTOMY     • HYSTERECTOMY     • KNEE ARTHROPLASTY     • MYOMECTOMY     • SHOULDER SURGERY     • SINUS SURGERY     • TONSILLECTOMY                               PT Assessment/Plan       18 1335       PT Assessment    Assessment Comments Brittany has responded well to aquatic  "therapy with decreased pain level and better tolerance to activity in water   -PB       User Key  (r) = Recorded By, (t) = Taken By, (c) = Cosigned By    Initials Name Provider Type    KETTY Noel PT Physical Therapist                    Exercises       01/18/18 1300          Subjective Comments    Subjective Comments I did not have pain at the end of last session and continue to feel better. The stairs do aggravate pain going up.  -PB      Subjective Pain    Able to rate subjective pain? yes  -PB      Pre-Treatment Pain Level 0  -PB      Post-Treatment Pain Level 0  -PB      Aquatics    Aquatics performed? Yes  -PB      Aquatics LE    Water Walk forward;side   x 3 each   -PB      Stretch 1 HS SN with small hip sweep x 60\" B  -PB      Stretch 2 Lower back BKTC walk up wall with LN x 10\"/5  -PB      Stretch 3 Calf 30\" B   -PB      Vertical Traction LN mid depth water at rail x 2 min   -PB      Abdominals noodle   LN x 15   -PB      Hip Abd/Add 15 B  -PB      March in Place 15   -PB      Toe/Heel Raises 15  -PB      Uni-Squat Small hip circles 10/2 B   -PB      Bicycle Seated & suspended X 2 min ea.  -PB      Flutter/Scissor Seated 20/20  -PB      Exercise 1    Exercise Name 1 Patient entered water via stairs and rail with SBA and exited pool via lift chair to walker   -PB        User Key  (r) = Recorded By, (t) = Taken By, (c) = Cosigned By    Initials Name Provider Type    KETTY Noel PT Physical Therapist                                            Time Calculation:   Start Time: 0950  Stop Time: 1033  Time Calculation (min): 43 min    Therapy Charges for Today     Code Description Service Date Service Provider Modifiers Qty    50583882929  PT AQUATIC THERAPY EA 15 MIN 1/18/2018 Rosemary Noel, PT GP 3                    Rosemary Noel, PT  1/18/2018     "

## 2018-01-22 ENCOUNTER — APPOINTMENT (OUTPATIENT)
Dept: LAB | Facility: HOSPITAL | Age: 83
End: 2018-01-22

## 2018-01-22 ENCOUNTER — OFFICE VISIT (OUTPATIENT)
Dept: ONCOLOGY | Facility: CLINIC | Age: 83
End: 2018-01-22

## 2018-01-22 VITALS
HEART RATE: 76 BPM | HEIGHT: 65 IN | BODY MASS INDEX: 41.75 KG/M2 | SYSTOLIC BLOOD PRESSURE: 132 MMHG | TEMPERATURE: 97.6 F | DIASTOLIC BLOOD PRESSURE: 70 MMHG | RESPIRATION RATE: 16 BRPM | WEIGHT: 250.6 LBS

## 2018-01-22 DIAGNOSIS — D70.8 OTHER NEUTROPENIA (HCC): Primary | ICD-10-CM

## 2018-01-22 DIAGNOSIS — R79.9 ABNORMAL FINDING OF BLOOD CHEMISTRY: ICD-10-CM

## 2018-01-22 DIAGNOSIS — D47.2 MGUS (MONOCLONAL GAMMOPATHY OF UNKNOWN SIGNIFICANCE): ICD-10-CM

## 2018-01-22 PROCEDURE — G0463 HOSPITAL OUTPT CLINIC VISIT: HCPCS | Performed by: INTERNAL MEDICINE

## 2018-01-22 PROCEDURE — 99213 OFFICE O/P EST LOW 20 MIN: CPT | Performed by: INTERNAL MEDICINE

## 2018-01-22 NOTE — PROGRESS NOTES
Subjective     REASON FOR FOLLOW UP:   1. CHRONIC LEUKOPENIA/ Neutropenia  2. IgA Lambda MGUS  3. Ulcerative Colitis  4. Positive ALEC    HISTORY OF PRESENT ILLNESS:  The patient is a 82 y.o. year old female who is here for follow up of the above noted  issuse.  She had actually been evaluated by Dr. Choi in our practice in 2014 for leukopenia.  At that time Dr. Choi had sent a peripheral blood flow cytometry which did not show any abnormal populations of white cells.  Ms. Villeda reports that she is actually feeling well in general.  She's not had any recurring infections.  Her white count remains low but stable.  Her platelets and hemoglobin are unremarkable.    She had a repeat serum protein electrophoresis performed last week that showed stable IgA monoclonal spike.  She remains on oral iron supplementation and her iron studies were within normal limits.    History of Present Illness       Current Outpatient Prescriptions on File Prior to Visit   Medication Sig Dispense Refill   • Acetaminophen (PAIN RELIEVER PO) Take  by mouth.     • calcium carbonate (OS-ALIDA) 600 MG tablet Take 600 mg by mouth Daily.     • ferrous sulfate 325 (65 FE) MG tablet Take 1 tablet by mouth Daily With Breakfast & Dinner. (Patient taking differently: Take 325 mg by mouth 2 (Two) Times a Day.) 60 tablet 5   • furosemide (LASIX) 20 MG tablet Take 2 tablets by mouth Daily. 270 tablet 3   • gabapentin (NEURONTIN) 300 MG capsule Take 1 capsule by mouth 2 (Two) Times a Day. 270 capsule 0   • mesalamine (LIALDA) 1.2 g EC tablet Take 4 tablets by mouth Daily. 360 tablet 3   • mometasone (ELOCON) 0.1 % ointment Apply 1 application topically 2 (Two) Times a Day.     • omeprazole (priLOSEC) 20 MG capsule Take 1 capsule by mouth Daily. 90 capsule 3   • vitamin B-12 (CYANOCOBALAMIN) 1000 MCG tablet Take 1,000 mcg by mouth Daily.     • warfarin (COUMADIN) 5 MG tablet Take 1 tablet by mouth Daily. 104 tablet 3     No current facility-administered  "medications on file prior to visit.         ALLERGIES:    Allergies   Allergen Reactions   • Amitriptyline    • Amoxicillin-Pot Clavulanate    • Aspirin    • Bactrim [Sulfamethoxazole-Trimethoprim]    • Codeine    • Iodinated Diagnostic Agents    • Latex    • Naproxen    • Nsaids    • Soma Compound With Codeine [Carisoprodol-Aspirin-Codeine]    • Sulfa Antibiotics           Review of Systems   Constitutional: Negative for activity change, appetite change, fatigue, fever and unexpected weight change.   HENT: Negative for hearing loss, nosebleeds, trouble swallowing and voice change.    Eyes: Negative for visual disturbance.   Respiratory: Negative for cough, shortness of breath and wheezing.    Cardiovascular: Negative for chest pain and palpitations.   Gastrointestinal: Negative for abdominal pain, nausea and vomiting.   Genitourinary: Negative for difficulty urinating, frequency, hematuria and urgency.   Musculoskeletal: Positive for arthralgias and gait problem. Negative for back pain and neck pain.   Skin: Negative for rash.   Neurological: Negative for dizziness, seizures, syncope and headaches.        Balance issues.   Hematological: Negative for adenopathy. Does not bruise/bleed easily.   Psychiatric/Behavioral: Negative for behavioral problems. The patient is not nervous/anxious.       Objective     Vitals:    01/22/18 1059   BP: 132/70   Pulse: 76   Resp: 16   Temp: 97.6 °F (36.4 °C)   Weight: 114 kg (250 lb 9.6 oz)   Height: 164 cm (64.57\")  Comment: new ht.   PainSc: 3  Comment: rt. hip     Current Status 1/22/2018   ECOG score 0       Physical Exam   Constitutional: She is oriented to person, place, and time. She appears well-developed and well-nourished. No distress.   HENT:   Head: Normocephalic.   Eyes: Conjunctivae and EOM are normal. Pupils are equal, round, and reactive to light. No scleral icterus.   Neck: Normal range of motion. Neck supple. No JVD present. No thyromegaly present. "   Cardiovascular: Normal rate and regular rhythm.  Exam reveals no gallop and no friction rub.    Murmur heard.  Pulmonary/Chest: Effort normal and breath sounds normal. She has no wheezes. She has no rales.   Abdominal: Soft. She exhibits no distension and no mass. There is no tenderness.   Musculoskeletal: Normal range of motion. She exhibits no edema or deformity.   Lymphadenopathy:     She has no cervical adenopathy.   Neurological: She is alert and oriented to person, place, and time. She has normal reflexes. No cranial nerve deficit.   Ambulates with a cane.   Skin: Skin is warm and dry. No rash noted. No erythema.   Psychiatric: She has a normal mood and affect. Her behavior is normal. Judgment normal.         RECENT LABS:  Hematology WBC   Date Value Ref Range Status   01/15/2018 3.22 (L) 4.00 - 10.00 10*3/mm3 Final     RBC   Date Value Ref Range Status   01/15/2018 3.96 3.90 - 5.00 10*6/mm3 Final     Hemoglobin   Date Value Ref Range Status   01/15/2018 12.9 11.5 - 14.9 g/dL Final     Hematocrit   Date Value Ref Range Status   01/15/2018 39.7 34.0 - 45.0 % Final     Platelets   Date Value Ref Range Status   01/15/2018 177 150 - 375 10*3/mm3 Final        Lab Results   Component Value Date    GLUCOSE 101 01/15/2018    BUN 16 01/15/2018    CREATININE 0.83 01/15/2018    EGFRIFNONA 51 (L) 01/02/2018    EGFRIFAFRI 80 01/15/2018    BCR 19.3 01/15/2018    K 4.2 01/15/2018    CO2 28.1 01/15/2018    CALCIUM 10.3 (H) 01/15/2018    PROTENTOTREF 7.2 01/15/2018    ALBUMIN 3.4 01/15/2018    ALBUMIN 4.00 01/15/2018    LABIL2 0.9 01/15/2018    LABIL2 1.1 01/15/2018    AST 15 01/15/2018    ALT 7 01/15/2018       Lab Results   Component Value Date    IRON 63 01/15/2018    TIBC 336 01/15/2018    FERRITIN 82.40 01/15/2018     1/15/2018  IgG 700 - 1600 mg/dL 1082   IgA 64 - 422 mg/dL 714 (H)   IgM 26 - 217 mg/dL 62   Total Protein 6.0 - 8.5 g/dL 7.2   Albumin 2.9 - 4.4 g/dL 3.4   Alpha-1-Globulin 0.0 - 0.4 g/dL 0.3    Alpha-2-Globulin 0.4 - 1.0 g/dL 0.7   Beta Globulin 0.7 - 1.3 g/dL 1.6 (H)   Gamma Globulin 0.4 - 1.8 g/dL 1.2   M-Kip Not Observed g/dL 0.4 (H)   Globulin 2.2 - 3.9 g/dL 3.8   A/G Ratio 0.7 - 1.7 0.9   Immunofixation Reflex, Serum  Comment   Comments: Immunofixation shows IgA monoclonal protein with lambda light chain   specificity.     Free Light Chain, Kappa 3.3 - 19.4 mg/L 36.1 (H)   Free Lambda Light Chains 5.7 - 26.3 mg/L 39.8 (H)   Kappa/Lambda Ratio 0.26 - 1.65 0.91       BONE MARROW BIOPSY 9/13/2017  No evidence malignancy or plasma cell excess. Absent stainable iron.    Assessment/Plan   1.  Chronic leukopenia/neutropenia without any recurring infections.  As noted above she been evaluated in our office in 2014 and at that time peripheral blood flow cytometry was normal.  I suspect this may be a combination of benign constitutional leukopenia, which is common among the  population with some contribution or worsening of leukopenia due to her Lialda for ulcerative colitis or possible autoimmune leukopenia.  Her counts remain low but stable. Bone marrow exam on 9/13/2017 did not show any evidence of marrow pathology other than absent stainable iron.  2.  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  Her IgA level and lambda light chains have increased slightly over the past 6 months.  With her bone marrow showing no excess of plasma cells we will just plan on observing this for now with repeat serum protein studies every 6 months.   3.  Ulcerative colitis.  She continues to follow-up with her gastroenterologist Dr. English  4.  Positive ALEC on previous lab work.  It is possible she could also have some degree of autoimmune neutropenia.  5.  Absent iron on the bone marrow examination.  We suspect patient is becoming iron deficient due to chronic GI blood loss from ulcerative colitis and chronic Coumadin therapy.    Plan    1.  We discussed the results of the lab studies with the  patient and  reassured her that monoclonal gammopathy and leukopenia are stable.  2.  She'll be scheduled for return visit in 6 months and will undergo repeat labs one week prior to that visit including CBC, serum chemistries, iron studies, and serum protein electrophoresis with immunofixation.

## 2018-01-23 ENCOUNTER — HOSPITAL ENCOUNTER (OUTPATIENT)
Dept: PHYSICAL THERAPY | Facility: HOSPITAL | Age: 83
Setting detail: THERAPIES SERIES
Discharge: HOME | End: 2018-01-23

## 2018-01-23 DIAGNOSIS — M25.551 RIGHT HIP PAIN: ICD-10-CM

## 2018-01-23 DIAGNOSIS — M51.36 DDD (DEGENERATIVE DISC DISEASE), LUMBAR: Primary | ICD-10-CM

## 2018-01-23 PROCEDURE — 97113 AQUATIC THERAPY/EXERCISES: CPT | Performed by: PHYSICAL THERAPIST

## 2018-01-23 NOTE — THERAPY PROGRESS REPORT/RE-CERT
Outpatient Physical Therapy Ortho Re-Assessment  [facility]     Patient Name: Brittany Villeda  : 1935  MRN: [id number]  Today's Date: 2018      Visit Date: 2018    Patient Active Problem List   Diagnosis   • Sciatica   • Non-toxic multinodular goiter   • Chronic midline low back pain without sciatica   • Essential hypertension   • Gastroesophageal reflux disease without esophagitis   • Cataract   • Pulmonary hypertension   • Paroxysmal atrial fibrillation   • Diastolic dysfunction   • HUGO (obstructive sleep apnea)   • Trifascicular block   • Leukopenia   • MGUS (monoclonal gammopathy of unknown significance)   • Ulcerative rectosigmoiditis without complication   • Lichen sclerosus        Past Medical History:   Diagnosis Date   • Anemia    • Arthritis    • Asthma    • Chest pain    • Colitis    • COPD (chronic obstructive pulmonary disease)    • Diastolic dysfunction    • Essential hypertension 2016   • GERD (gastroesophageal reflux disease)    • Hypertension    • Hypertensive heart disease    • Hyperthyroidism    • Kidney stone    • Low back pain    • MGUS (monoclonal gammopathy of unknown significance)    • Nephrolithiasis    • Obesity    • Paroxysmal atrial fibrillation    • Peptic ulceration    • Pulmonary hypertension    • Sleep apnea    • Ventricular tachycardia     nonsustained   • Vertigo         Past Surgical History:   Procedure Laterality Date   • CHOLECYSTECTOMY     • COLONOSCOPY  2014    colitis, cryptitis,  tics, NBIH, TA w/low grade dysplasia   • HEMORRHOIDECTOMY     • HYSTERECTOMY     • KNEE ARTHROPLASTY     • MYOMECTOMY     • SHOULDER SURGERY     • SINUS SURGERY     • TONSILLECTOMY         Visit Dx:     ICD-10-CM ICD-9-CM   1. DDD (degenerative disc disease), lumbar M51.36 722.52   2. Right hip pain M25.551 719.45                                       PT OP Goals       18 1200       PT Short Term Goals    STG 1 Patient will report a reduction in pain for  12-24 hours or greater following aquatic therapy session  -PB     STG 1 Progress Met  -PB     STG 1 Progress Comments Pain 0/10   -PB     STG 2 Patient will perform sit to stand from pool bench without using hands to demonstrate increased functional strength  -PB     STG 2 Progress Ongoing  -PB     STG 2 Progress Comments Requires B hand support to balance to stand up and sit   -PB     STG 3 Patient will increase walking tolerance from 15 min to 20 min or greater in order to increase level of physical activity and allow patient to participate in social outings  -PB     STG 3 Progress Progressing  -PB     STG 3 Progress Comments Walking same distance about 15 min without pain   -PB     Long Term Goals    LTG 1 Patient will improve 30 sec sit to stand test from 2.5 stands to 3 stands to demonstrate improved functional strength and reduced risk of falls  -PB     LTG 1 Progress Met  -PB     LTG 1 Progress Comments 4 stand with B hand support   -PB     LTG 2 Patient will increase standing tolerance from 10 min to 20 min or greater to increase ease with ADLs/household chores  -PB     LTG 2 Progress Progressing  -PB     LTG 2 Progress Comments Standing more comfortably for ADLS  -PB     LTG 3 Patient will improve perceived level of disability as measured by the Oswestry from 70% disability to </= 60% disability in order to improve quality of life  -PB     LTG 3 Progress Ongoing  -PB     LTG 3 Progress Comments Plan to reassess visit 10   -PB       User Key  (r) = Recorded By, (t) = Taken By, (c) = Cosigned By    Initials Name Provider Type    KETTY Noel, PT Physical Therapist                PT Assessment/Plan       01/23/18 1234       PT Assessment    Functional Limitations Limitations in community activities;Limitations in functional capacity and performance;Limitation in home management;Performance in self-care ADL;Impaired gait;Decreased safety during functional activities;Performance in leisure activities  -PB      Impairments Balance;Posture;Range of motion;Muscle strength;Pain;Gait;Endurance;Joint integrity;Joint mobility;Impaired flexibility  -PB     Assessment Comments Brittany has been seen for evaluation and 4 aquatic therapy sessions for right hip pain.  She has responded very well to pain relief with aquatic therapy and only reported mild pain with one exercise performed today.  She does feel better avoiding the stairs to exit the pool so that her pain relief is lasting.  She demonstrates imrpoved sit to stand mobility but does require B hand support and she is able to perform more exercises in the water to help improve he r strength and endurance.  She has not returned to [facility] since November 5th because she has to walk up a steeper ramp but that is a personal goal.  She did miss some therapy visits in the past month due to hospitilization so her therapy has been extended this next month.   -PB     Please refer to paper survey for additional self-reported information Yes  -PB     Rehab Potential Good  -PB     Patient/caregiver participated in establishment of treatment plan and goals Yes  -PB     Patient would benefit from skilled therapy intervention Yes  -PB     PT Plan    PT Frequency 2x/week  -PB     Predicted Duration of Therapy Intervention (days/wks) 30 days   -PB     Planned CPT's? PT AQUATIC THERAPY EA 15 MIN: 67631;PT THER PROC EA 15 MIN: 25316;PT GAIT TRAINING EA 15 MIN: 80681;PT NEUROMUSC RE-EDUCATION EA 15 MIN: 12451  -PB       User Key  (r) = Recorded By, (t) = Taken By, (c) = Cosigned By    Initials Name Provider Type    PB Rosemary Noel, PT Physical Therapist                  Exercises       01/23/18 1200          Subjective Comments    Subjective Comments Still not having any pain.  The water is a miracle.   -PB      Subjective Pain    Able to rate subjective pain? yes  -PB      Pre-Treatment Pain Level 0  -PB      Post-Treatment Pain Level 0  -PB      Subjective Pain Comment 1/10 R hip pain with  "hip AROM flex/ext exercise   -PB      Aquatics    Aquatics performed? Yes  -PB      Aquatics LE    Water Walk forward;side;backward;Other (comment)   heel to toe balance walk at rail   -PB      Stretch 2 Lower back BKTC walk up wall with LN x 10\"/5  -PB      Stretch 3 Calf 30\" B   -PB      Vertical Traction LN mid depth water at rail x 2 min   -PB      Abdominals noodle   LN x 15   -PB      Hip Abd/Add 15 B  -PB      Hip Flex/Ext 15 B  -PB      March in Place 15  -PB      Mini Squat Sit to stand from bench with B hand support x 5  -PB      Toe/Heel Raises 15  -PB      Uni-Squat Small hip circles 10/2 B   -PB      Bicycle Seated 1 min  -PB      Flutter/Scissor Seated 20/20  -PB      Exercise 1    Exercise Name 1 Patient entered water via stairs and rail with SBA and exited pool via lift chair to walker   -PB        User Key  (r) = Recorded By, (t) = Taken By, (c) = Cosigned By    Initials Name Provider Type    PB Rosemary Noel, PT Physical Therapist                                  Time Calculation:   Start Time: 0945  Stop Time: 1030  Time Calculation (min): 45 min     Therapy Charges for Today     Code Description Service Date Service Provider Modifiers Qty    [id number] HC PT AQUATIC THERAPY EA 15 MIN 1/23/2018 Rosemary Noel, PT GP 3                    Rosemary Noel, PT  1/23/2018      "

## 2018-01-25 ENCOUNTER — HOSPITAL ENCOUNTER (OUTPATIENT)
Dept: PHYSICAL THERAPY | Facility: HOSPITAL | Age: 83
Setting detail: THERAPIES SERIES
Discharge: HOME | End: 2018-01-25

## 2018-01-25 ENCOUNTER — HOSPITAL ENCOUNTER (OUTPATIENT)
Dept: CARDIOLOGY | Facility: HOSPITAL | Age: 83
Setting detail: RECURRING SERIES
Discharge: HOME | End: 2018-01-25

## 2018-01-25 DIAGNOSIS — M51.36 DDD (DEGENERATIVE DISC DISEASE), LUMBAR: Primary | ICD-10-CM

## 2018-01-25 DIAGNOSIS — M25.551 RIGHT HIP PAIN: ICD-10-CM

## 2018-01-25 PROCEDURE — 85610 PROTHROMBIN TIME: CPT

## 2018-01-25 PROCEDURE — 36416 COLLJ CAPILLARY BLOOD SPEC: CPT

## 2018-01-25 PROCEDURE — 97113 AQUATIC THERAPY/EXERCISES: CPT | Performed by: PHYSICAL THERAPIST

## 2018-01-25 NOTE — THERAPY TREATMENT NOTE
Outpatient Physical Therapy Ortho Treatment Note  [facility]     Patient Name: Brittany Villeda  : 1935  MRN: [id number]  Today's Date: 2018      Visit Date: 2018    Visit Dx:    ICD-10-CM ICD-9-CM   1. DDD (degenerative disc disease), lumbar M51.36 722.52   2. Right hip pain M25.551 719.45       Patient Active Problem List   Diagnosis   • Sciatica   • Non-toxic multinodular goiter   • Chronic midline low back pain without sciatica   • Essential hypertension   • Gastroesophageal reflux disease without esophagitis   • Cataract   • Pulmonary hypertension   • Paroxysmal atrial fibrillation   • Diastolic dysfunction   • HUGO (obstructive sleep apnea)   • Trifascicular block   • Leukopenia   • MGUS (monoclonal gammopathy of unknown significance)   • Ulcerative rectosigmoiditis without complication   • Lichen sclerosus        Past Medical History:   Diagnosis Date   • Anemia    • Arthritis    • Asthma    • Chest pain    • Colitis    • COPD (chronic obstructive pulmonary disease)    • Diastolic dysfunction    • Essential hypertension 2016   • GERD (gastroesophageal reflux disease)    • Hypertension    • Hypertensive heart disease    • Hyperthyroidism    • Kidney stone    • Low back pain    • MGUS (monoclonal gammopathy of unknown significance)    • Nephrolithiasis    • Obesity    • Paroxysmal atrial fibrillation    • Peptic ulceration    • Pulmonary hypertension    • Sleep apnea    • Ventricular tachycardia     nonsustained   • Vertigo         Past Surgical History:   Procedure Laterality Date   • CHOLECYSTECTOMY     • COLONOSCOPY  2014    colitis, cryptitis,  tics, NBIH, TA w/low grade dysplasia   • HEMORRHOIDECTOMY     • HYSTERECTOMY     • KNEE ARTHROPLASTY     • MYOMECTOMY     • SHOULDER SURGERY     • SINUS SURGERY     • TONSILLECTOMY                               PT Assessment/Plan       18 1220       PT Assessment    Assessment Comments Brittany had reported extended pain  "relief until after last session her pain increased to 5/10 apparently from performing sit to stand test that placed greater strain on her shoulder and hip from seat position and she is used to getting up from chair with arms.   -PB     PT Plan    PT Plan Comments Continue 2 weeks of therapy likely for core and leg AROM, gait, and flexibility to help return pain level back to 0/10   -PB       User Key  (r) = Recorded By, (t) = Taken By, (c) = Cosigned By    Initials Name Provider Type    KETTY Noel, PT Physical Therapist                    Exercises       01/25/18 0900          Subjective Comments    Subjective Comments Not feeling as good.  Having pain in L shoulder and R hip, I think it was from doing to sit to stand 5 times last visit.   -PB      Subjective Pain    Able to rate subjective pain? yes  -PB      Pre-Treatment Pain Level 5  -PB      Post-Treatment Pain Level 4  -PB      Subjective Pain Comment Not as painful in the water  -PB      Aquatics LE    Water Walk forward;side;backward;Other (comment)  -PB      Stretch 2 Lower back BKTC walk up wall with LN x 10\"/5  -PB      Stretch 3 Calf 30\" B   -PB      Vertical Traction LN mid depth water at rail x 2 min   -PB      Abdominals noodle   SN x 15   -PB      Hip Abd/Add 15 B  -PB      Hip Flex/Ext 15 B  -PB      March in Place 15  -PB      Mini Squat --  -PB      Toe/Heel Raises 15  -PB      Uni-Squat Small hip circles 10/2 B   -PB      Bicycle Seated 1 min  -PB      Flutter/Scissor Seated 20/20  -PB      Exercise 1    Exercise Name 1 Patient entered water via stairs and rail with SBA and exited pool via lift chair to walker   -PB        User Key  (r) = Recorded By, (t) = Taken By, (c) = Cosigned By    Initials Name Provider Type    KETTY Noel PT Physical Therapist                                            Time Calculation:   Start Time: 0950  Stop Time: 1029  Time Calculation (min): 39 min    Therapy Charges for Today     Code Description " Service Date Service Provider Modifiers Qty    28498260882 HC PT AQUATIC THERAPY EA 15 MIN 1/25/2018 Rosemary Noel, PT GP 3                    Rosemary Noel, PT  1/25/2018

## 2018-01-26 ENCOUNTER — OFFICE VISIT (OUTPATIENT)
Dept: CARDIOLOGY | Facility: CLINIC | Age: 83
End: 2018-01-26

## 2018-01-26 VITALS
HEART RATE: 62 BPM | WEIGHT: 246 LBS | SYSTOLIC BLOOD PRESSURE: 120 MMHG | HEIGHT: 68 IN | DIASTOLIC BLOOD PRESSURE: 68 MMHG | BODY MASS INDEX: 37.28 KG/M2

## 2018-01-26 DIAGNOSIS — I48.0 PAROXYSMAL ATRIAL FIBRILLATION (HCC): Primary | ICD-10-CM

## 2018-01-26 DIAGNOSIS — I51.89 DIASTOLIC DYSFUNCTION: ICD-10-CM

## 2018-01-26 DIAGNOSIS — I45.3 TRIFASCICULAR BLOCK: ICD-10-CM

## 2018-01-26 DIAGNOSIS — I27.20 PULMONARY HYPERTENSION (HCC): ICD-10-CM

## 2018-01-26 DIAGNOSIS — D47.2 MGUS (MONOCLONAL GAMMOPATHY OF UNKNOWN SIGNIFICANCE): ICD-10-CM

## 2018-01-26 DIAGNOSIS — I10 ESSENTIAL HYPERTENSION: ICD-10-CM

## 2018-01-26 PROCEDURE — 99213 OFFICE O/P EST LOW 20 MIN: CPT | Performed by: INTERNAL MEDICINE

## 2018-01-26 PROCEDURE — 93000 ELECTROCARDIOGRAM COMPLETE: CPT | Performed by: INTERNAL MEDICINE

## 2018-01-26 RX ORDER — FUROSEMIDE 20 MG/1
40 TABLET ORAL EVERY MORNING
COMMUNITY
End: 2018-06-26 | Stop reason: SDUPTHER

## 2018-01-26 NOTE — TELEPHONE ENCOUNTER
Mega,     I had a nice discussion with the patient today about ongoing use of Coumadin especially as we have not had any known recurrence of atrial fibrillation though she is certainly at risk for an embolic event given her chads VASC factors.  We discussed frankly discontinue warfarin/anticoagulation and she was quite insistent that she is ambulating well with a walker and is not having any troubles with balance or falls.  She is quite hesitant to discontinue warfarin.  She and her family promised to pay close attention to her on ambulation and will call promptly if they notice a change.  So for now opted to stay on Coumadin    Mini

## 2018-01-27 NOTE — PROGRESS NOTES
Date of Office Visit: 2018  Encounter Provider: Mary Grace Culp MD  Place of Service: [facility] CARDIOLOGY  Patient Name: Brittany Villeda  :1935    Chief complaint  follow-up of paroxysmal atrial fibrillation, hypertension with hypertensive heart disease conduction disease and chronic warfarin therapy     History of Present Illness  The patient is a pleasant, 82-year-old female with a history of hypertension, obstructive sleep apnea, obesity, immobility, pulmonary hypertension, history of nonsustained ventricular tachycardia, and obstructive sleep apnea.  She has a history of diastolic dysfunction.  In , she had nonsustained ventricular tachycardia.  A stress perfusion study was negative for ischemia.  An echocardiogram revealed normal left ventricular size and function with moderate left ventricular hypertrophy.  In 2012, she had chronic obstructive pulmonary disease exacerbation as well as chest pain that resolved with treatment of her chronic obstructive pulmonary disease.  In 2013, she was seen for persistent dizziness in the setting of new-onset paroxysmal atrial fibrillation.  She was admitted to the hospital and not felt to have had a stroke.  Her symptoms actually resolved with ear manipulation and were felt to be more vestibular in nature.  She also developed paroxysmal atrial fibrillation with early bradycardia which resolved with treatment of her sleep apnea and AV flaco blocker therapy.  She was placed on warfarin.  She also had a heme-positive stool with anemia and was seen by the gastrointestinal doctors and EGD and colonoscopy were performed. The EGD revealed mild erythema but otherwise stable.  She initiated therapy with a BiPAP and now presents for follow-up.      Since last visit, the patient has not returned for follow-up, though was admitted in 2017 with chest pain.  She has a stress perfusion study that was negative for ischemia.  She had  a Holter performed in 12/2017 that showed episodes of supraventricular tachycardia with 1st degree AV block. Episodes of supraventricular tachycardia lasted up to 16 minutes. Some of these were symptomatic.  Since then, she denies any chest pain, shortness of breath, palpitations, syncope, or near syncope.  She is ambulating with a walker around her home.  She has had no further palpitations after decreasing her stimulant use. She has had no falls or bleeding abnormalities that she is aware of.    Past Medical History:   Diagnosis Date   • Anemia    • Arthritis    • Asthma    • Chest pain    • Colitis    • COPD (chronic obstructive pulmonary disease)    • Diastolic dysfunction    • Essential hypertension 5/12/2016   • GERD (gastroesophageal reflux disease)    • Hypertension    • Hypertensive heart disease    • Hyperthyroidism    • Kidney stone    • Low back pain    • MGUS (monoclonal gammopathy of unknown significance)    • Nephrolithiasis    • Obesity    • Paroxysmal atrial fibrillation    • Peptic ulceration    • Pulmonary hypertension    • Sleep apnea    • Ventricular tachycardia     nonsustained   • Vertigo      Past Surgical History:   Procedure Laterality Date   • CHOLECYSTECTOMY     • COLONOSCOPY  06/16/2014    colitis, cryptitis,  tics, NBIH, TA w/low grade dysplasia   • HEMORRHOIDECTOMY     • HYSTERECTOMY     • KNEE ARTHROPLASTY     • MYOMECTOMY     • SHOULDER SURGERY     • SINUS SURGERY     • TONSILLECTOMY       Outpatient Medications Prior to Visit   Medication Sig Dispense Refill   • Acetaminophen (PAIN RELIEVER PO) Take  by mouth.     • calcium carbonate (OS-ALIDA) 600 MG tablet Take 600 mg by mouth Daily.     • ferrous sulfate 325 (65 FE) MG tablet Take 1 tablet by mouth Daily With Breakfast & Dinner. (Patient taking differently: Take 325 mg by mouth 2 (Two) Times a Day.) 60 tablet 5   • gabapentin (NEURONTIN) 300 MG capsule Take 1 capsule by mouth 2 (Two) Times a Day. 270 capsule 0   • mesalamine  (LIALDA) 1.2 g EC tablet Take 4 tablets by mouth Daily. 360 tablet 3   • mometasone (ELOCON) 0.1 % ointment Apply 1 application topically 2 (Two) Times a Day.     • omeprazole (priLOSEC) 20 MG capsule Take 1 capsule by mouth Daily. 90 capsule 3   • vitamin B-12 (CYANOCOBALAMIN) 1000 MCG tablet Take 1,000 mcg by mouth Daily.     • warfarin (COUMADIN) 5 MG tablet Take 1 tablet by mouth Daily. 104 tablet 3   • furosemide (LASIX) 20 MG tablet Take 2 tablets by mouth Daily. (Patient taking differently: Take 40 mg by mouth Every Morning. 20mg Q PM) 270 tablet 3     No facility-administered medications prior to visit.      Allergies as of 01/26/2018 - Merlin as Reviewed 01/26/2018   Allergen Reaction Noted   • Amitriptyline  05/12/2016   • Amoxicillin-pot clavulanate  05/12/2016   • Aspirin  05/12/2016   • Bactrim [sulfamethoxazole-trimethoprim]  05/12/2016   • Codeine  11/30/2017   • Iodinated diagnostic agents  05/12/2016   • Latex  09/12/2016   • Naproxen  05/12/2016   • Nsaids  05/12/2016   • Soma compound with codeine [carisoprodol-aspirin-codeine]  09/12/2016   • Sulfa antibiotics  05/12/2016     Social History     Social History   • Marital status:      Spouse name: N/A   • Number of children: 10   • Years of education: High School     Occupational History   • Caregiver Retired     worked for [employer]     Social History Main Topics   • Smoking status: Former Smoker     Packs/day: 1.50     Years: 10.00   • Smokeless tobacco: Never Used   • Alcohol use No   • Drug use: No   • Sexual activity: No     Other Topics Concern   • Not on file     Social History Narrative     Family History   Problem Relation Age of Onset   • Diabetes Mother    • Breast cancer Sister    • Kidney cancer Sister    • Heart disease Sister    • Prostate cancer Brother    • Prostate cancer Brother    • Prostate cancer Brother      Review of Systems   Constitution: Negative for fever, malaise/fatigue, weight gain and weight  "loss.   HENT: Negative for ear pain, hearing loss, nosebleeds and sore throat.    Eyes: Negative for double vision, pain, vision loss in left eye and vision loss in right eye.   Cardiovascular:        See history of present illness.   Respiratory: Negative for cough, shortness of breath, sleep disturbances due to breathing, snoring and wheezing.    Endocrine: Negative for cold intolerance, heat intolerance and polyuria.   Skin: Negative for itching, poor wound healing and rash.   Musculoskeletal: Negative for joint pain, joint swelling and myalgias.   Gastrointestinal: Negative for abdominal pain, diarrhea, hematochezia, nausea and vomiting.   Genitourinary: Negative for hematuria and hesitancy.   Neurological: Negative for numbness, paresthesias and seizures.   Psychiatric/Behavioral: Negative for depression. The patient is not nervous/anxious.      Objective:     Vitals:    01/26/18 1034   BP: 120/68   Pulse: 62   Weight: 112 kg (246 lb)   Height: 172.7 cm (68\")     Body mass index is 37.4 kg/(m^2).    Physical Exam   Constitutional: She is oriented to person, place, and time. She appears well-developed and well-nourished. No distress.   Obese   HENT:   Head: Normocephalic.   Eyes: Conjunctivae are normal. Pupils are equal, round, and reactive to light. No scleral icterus.   Neck: Normal carotid pulses, no hepatojugular reflux and no JVD present. Carotid bruit is not present. No tracheal deviation, no edema and no erythema present. No thyromegaly present.   Cardiovascular: Normal rate, regular rhythm, S1 normal, S2 normal, normal heart sounds and intact distal pulses.   No extrasystoles are present. PMI is not displaced.  Exam reveals no gallop, no distant heart sounds and no friction rub.    No murmur heard.  Pulses:       Carotid pulses are 2+ on the right side, and 2+ on the left side.       Radial pulses are 2+ on the right side, and 2+ on the left side.        Femoral pulses are 2+ on the right side, and 2+ " on the left side.       Dorsalis pedis pulses are 2+ on the right side, and 2+ on the left side.        Posterior tibial pulses are 2+ on the right side, and 2+ on the left side.   Pulmonary/Chest: Effort normal and breath sounds normal. No respiratory distress. She has no decreased breath sounds. She has no wheezes. She has no rhonchi. She has no rales. She exhibits no tenderness.   Abdominal: Soft. Bowel sounds are normal. She exhibits no distension and no mass. There is no hepatosplenomegaly. There is no tenderness. There is no rebound and no guarding.   Musculoskeletal: She exhibits no edema, tenderness or deformity.   Neurological: She is alert and oriented to person, place, and time.   Skin: Skin is warm and dry. No rash noted. She is not diaphoretic. No cyanosis or erythema. No pallor. Nails show no clubbing.   Psychiatric: She has a normal mood and affect. Her speech is normal and behavior is normal. Judgment and thought content normal.     Lab Review:     ECG 12 Lead  Date/Time: 1/26/2018 9:20 PM  Performed by: DAMIAN PAZ  Authorized by: DAMIAN PAZ   Comparison: compared with previous ECG   Similar to previous ECG  Rhythm: sinus rhythm  Rhythm comments: PAC  Conduction: right bundle branch block, LAFB and 1st degree  Clinical impression: abnormal ECG          Assessment:       Diagnosis Plan   1. Paroxysmal atrial fibrillation  ECG 12 Lead   2. Pulmonary hypertension  ECG 12 Lead   3. Essential hypertension  ECG 12 Lead   4. Diastolic dysfunction  ECG 12 Lead   5. Trifascicular block  ECG 12 Lead   6. MGUS (monoclonal gammopathy of unknown significance)  ECG 12 Lead     Plan:       1.  Distant history of paroxysmal atrial fibrillation. Dr. Esparza had raised the concern about her fall risks and the fact that atrial fibrillation was in the distant past. I did discuss these concerns with the patient.  However, she insists that she has not had any falls and that she feels fairly stable on her walker around her  home and outside of her home.  She prefers to stay on warfarin therapy at this time, knowing well that she has not had any know recurrences.  She does have a [id number] score that is elevated and it is certainly reasonable to continue with warfarin at this point.  She will call if her gait changes at all.    2.  Supraventricular tachycardia.  Symptoms improved with decreased stimulant use.  3.  Hypertension, under good control.  4.  History of diastolic dysfunction.   5.  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  6.  Pulmonary hypertension.  Last echocardiogram in 2014 showed an RV systolic pressure of 41 mmHg. Clinically, she is doing well.  Will continue with the current regimen.     Atrial Fibrillation and Atrial Flutter  Assessment  • The patient has paroxysmal atrial fibrillation  • This is non-valvular in etiology  • The patient's CHADS2-VASc score is 4  • A [id number] score of 2 or more is considered a high risk for a thromboembolic event  • Warfarin prescribed    Plan  • Attempt to maintain sinus rhythm  • Continue warfarin for antithrombotic therapy, bleeding issues discussed       Brittany Villeda   Home Medication Instructions CANDI:    Printed on:01/27/18 2046   Medication Information                      Acetaminophen (PAIN RELIEVER PO)  Take  by mouth.             calcium carbonate (OS-ALIDA) 600 MG tablet  Take 600 mg by mouth Daily.             ferrous sulfate 325 (65 FE) MG tablet  Take 1 tablet by mouth Daily With Breakfast & Dinner.             furosemide (LASIX) 20 MG tablet  Take 40 mg by mouth Every Morning. 20mg in the PM             gabapentin (NEURONTIN) 300 MG capsule  Take 1 capsule by mouth 2 (Two) Times a Day.             mesalamine (LIALDA) 1.2 g EC tablet  Take 4 tablets by mouth Daily.             mometasone (ELOCON) 0.1 % ointment  Apply 1 application topically 2 (Two) Times a Day.             omeprazole (priLOSEC) 20 MG capsule  Take 1 capsule by mouth  Daily.             vitamin B-12 (CYANOCOBALAMIN) 1000 MCG tablet  Take 1,000 mcg by mouth Daily.             warfarin (COUMADIN) 5 MG tablet  Take 1 tablet by mouth Daily.               Dictated utilizing Dragon dictation

## 2018-01-30 ENCOUNTER — HOSPITAL ENCOUNTER (OUTPATIENT)
Dept: PHYSICAL THERAPY | Facility: HOSPITAL | Age: 83
Setting detail: THERAPIES SERIES
Discharge: HOME | End: 2018-01-30

## 2018-01-30 ENCOUNTER — HOSPITAL ENCOUNTER (OUTPATIENT)
Dept: MAMMOGRAPHY | Facility: HOSPITAL | Age: 83
Discharge: HOME | End: 2018-01-30
Admitting: FAMILY MEDICINE

## 2018-01-30 DIAGNOSIS — M25.551 RIGHT HIP PAIN: ICD-10-CM

## 2018-01-30 DIAGNOSIS — Z12.31 VISIT FOR SCREENING MAMMOGRAM: ICD-10-CM

## 2018-01-30 DIAGNOSIS — M51.36 DDD (DEGENERATIVE DISC DISEASE), LUMBAR: Primary | ICD-10-CM

## 2018-01-30 PROCEDURE — 97113 AQUATIC THERAPY/EXERCISES: CPT | Performed by: PHYSICAL THERAPIST

## 2018-01-30 PROCEDURE — 77067 SCR MAMMO BI INCL CAD: CPT

## 2018-01-30 NOTE — THERAPY TREATMENT NOTE
Outpatient Physical Therapy Ortho Treatment Note  [facility]     Patient Name: Brittany Villeda  : 1935  MRN: [id number]  Today's Date: 2018      Visit Date: 2018    Visit Dx:    ICD-10-CM ICD-9-CM   1. DDD (degenerative disc disease), lumbar M51.36 722.52   2. Right hip pain M25.551 719.45       Patient Active Problem List   Diagnosis   • Sciatica   • Non-toxic multinodular goiter   • Chronic midline low back pain without sciatica   • Essential hypertension   • Gastroesophageal reflux disease without esophagitis   • Cataract   • Pulmonary hypertension   • Paroxysmal atrial fibrillation   • Diastolic dysfunction   • HUGO (obstructive sleep apnea)   • Trifascicular block   • Leukopenia   • MGUS (monoclonal gammopathy of unknown significance)   • Ulcerative rectosigmoiditis without complication   • Lichen sclerosus        Past Medical History:   Diagnosis Date   • Anemia    • Arthritis    • Asthma    • Chest pain    • Colitis    • COPD (chronic obstructive pulmonary disease)    • Diastolic dysfunction    • Essential hypertension 2016   • GERD (gastroesophageal reflux disease)    • Hypertension    • Hypertensive heart disease    • Hyperthyroidism    • Kidney stone    • Low back pain    • MGUS (monoclonal gammopathy of unknown significance)    • Nephrolithiasis    • Obesity    • Paroxysmal atrial fibrillation    • Peptic ulceration    • Pulmonary hypertension    • Sleep apnea    • Ventricular tachycardia     nonsustained   • Vertigo         Past Surgical History:   Procedure Laterality Date   • CHOLECYSTECTOMY     • COLONOSCOPY  2014    colitis, cryptitis,  tics, NBIH, TA w/low grade dysplasia   • HEMORRHOIDECTOMY     • HYSTERECTOMY     • KNEE ARTHROPLASTY     • MYOMECTOMY     • SHOULDER SURGERY     • SINUS SURGERY     • TONSILLECTOMY                               PT Assessment/Plan       18 1030       PT Assessment    Assessment Comments Brittany reports her pain has decreased  since flare up last week.  Progressed to leg press against foam ring which was challenging and required moderate UE support on rail and shallow water. Backward walking is unsteady and required SBA today in shallower water.  -HEMALATHA       User Key  (r) = Recorded By, (t) = Taken By, (c) = Cosigned By    Initials Name Provider Type    HEMALATHA Yin, PT Physical Therapist                    Exercises       01/30/18 0900          Subjective Pain    Able to rate subjective pain? yes  -HEMALATHA      Pre-Treatment Pain Level 0  -HEMALATHA      Post-Treatment Pain Level 0  -HEMALATHA      Subjective Pain Comment Feeling better today.  -HEMALATHA      Aquatics    Aquatics performed? Yes  -HEMALATHA      Aquatics LE    Water Walk forward;side;backward   100 ft e.  -HEMALATHA      Stretch 2 BKTC 10 sec X 2  -HEMALATHA      Stretch 3 Calf 20 sec X 2 ea  -HEMALATHA      Abdominals noodle   Large Noodle X 15  -HEMALATHA      Clams 15X  -HEMALATHA      Hip Abd/Add 15 B  -HEMALATHA      Hip Flex/Ext 15 B  -HEMALATHA      March in Place 15X ( suspended)  -HEMALATHA      Mini Squat Leg Press Shallow blue ring X 10 ea with moderate UE support on rail.  -HEMALATHA      Toe/Heel Raises -/15 B  -HEMALATHA      Uni-Squat Hip Circles 10/10  -HEMALATHA      Bicycle Seated X 1min., floating X 1 min.  -HEMALATHA      Flutter/Scissor Floating 20/20  -HEMALATHA      Exercise 1    Exercise Name 1 Patient entered water via stairs and rail with SBA and exited pool via lift chair to walker   -HEMALATHA        User Key  (r) = Recorded By, (t) = Taken By, (c) = Cosigned By    Initials Name Provider Type    HEMALATHA Yin, PT Physical Therapist                                            Time Calculation:   Start Time: 0945  Stop Time: 1030  Time Calculation (min): 45 min    Therapy Charges for Today     Code Description Service Date Service Provider Modifiers Qty    39042012174  PT AQUATIC THERAPY EA 15 MIN 1/30/2018 Dima Yin, PT GP 3                    Dima Yin, PT  1/30/2018

## 2018-02-01 ENCOUNTER — HOSPITAL ENCOUNTER (OUTPATIENT)
Dept: PHYSICAL THERAPY | Facility: HOSPITAL | Age: 83
Setting detail: THERAPIES SERIES
Discharge: HOME | End: 2018-02-01

## 2018-02-01 DIAGNOSIS — M25.551 RIGHT HIP PAIN: ICD-10-CM

## 2018-02-01 DIAGNOSIS — M51.36 DDD (DEGENERATIVE DISC DISEASE), LUMBAR: Primary | ICD-10-CM

## 2018-02-01 PROCEDURE — 97113 AQUATIC THERAPY/EXERCISES: CPT | Performed by: PHYSICAL THERAPIST

## 2018-02-01 NOTE — THERAPY TREATMENT NOTE
Outpatient Physical Therapy Ortho Treatment Note  [facility]     Patient Name: Brittany Villeda  : 1935  MRN: [id number]  Today's Date: 2018      Visit Date: 2018    Visit Dx:    ICD-10-CM ICD-9-CM   1. DDD (degenerative disc disease), lumbar M51.36 722.52   2. Right hip pain M25.551 719.45       Patient Active Problem List   Diagnosis   • Sciatica   • Non-toxic multinodular goiter   • Chronic midline low back pain without sciatica   • Essential hypertension   • Gastroesophageal reflux disease without esophagitis   • Cataract   • Pulmonary hypertension   • Paroxysmal atrial fibrillation   • Diastolic dysfunction   • HUGO (obstructive sleep apnea)   • Trifascicular block   • Leukopenia   • MGUS (monoclonal gammopathy of unknown significance)   • Ulcerative rectosigmoiditis without complication   • Lichen sclerosus        Past Medical History:   Diagnosis Date   • Anemia    • Arthritis    • Asthma    • Chest pain    • Colitis    • COPD (chronic obstructive pulmonary disease)    • Diastolic dysfunction    • Essential hypertension 2016   • GERD (gastroesophageal reflux disease)    • Hypertension    • Hypertensive heart disease    • Hyperthyroidism    • Kidney stone    • Low back pain    • MGUS (monoclonal gammopathy of unknown significance)    • Nephrolithiasis    • Obesity    • Paroxysmal atrial fibrillation    • Peptic ulceration    • Pulmonary hypertension    • Sleep apnea    • Ventricular tachycardia     nonsustained   • Vertigo         Past Surgical History:   Procedure Laterality Date   • CHOLECYSTECTOMY     • COLONOSCOPY  2014    colitis, cryptitis,  tics, NBIH, TA w/low grade dysplasia   • HEMORRHOIDECTOMY     • HYSTERECTOMY     • KNEE ARTHROPLASTY     • MYOMECTOMY     • SHOULDER SURGERY     • SINUS SURGERY     • TONSILLECTOMY                               PT Assessment/Plan       18 1420       PT Assessment    Assessment Comments did well today and added box step. pt  "with general pain today in many joints but still managed going down steps okay. went to deeper water today for improved steadiness and indep.  -ZK       User Key  (r) = Recorded By, (t) = Taken By, (c) = Cosigned By    Initials Name Provider Type    ZK Zorre Zeno Kimura, PT Physical Therapist                    Exercises       02/01/18 1000          Subjective Comments    Subjective Comments sore from weather. agrees to use heating pad at home more.  -ZK      Subjective Pain    Able to rate subjective pain? yes  -ZK      Pre-Treatment Pain Level 6  -ZK      Post-Treatment Pain Level 4  -ZK      Subjective Pain Comment right hip shoulders too  -ZK      Aquatics    Aquatics performed? Yes  -ZK      Aquatics LE    Water Walk forward;side;backward   10 minutes at end of rx. deep water indep  -ZK      Stretch Other 1 easy shoulder AROM in easy scaption plane for ab add and breastroke motions 5'  -ZK      Hip Abd/Add 15  -ZK      Hip Flex/Ext 15  -ZK      March in Place on pool floor 30  -ZK      Mini Squat SN 20 leg press  -ZK      Uni-Squat 10-10  -ZK      Step Ups 4\" with railing 15x  -ZK      Bicycle at bench 2'  -ZK      Flutter/Scissor bench 2'  -ZK      Exercise 1    Exercise Name 1 steps in, lift chair out.   -ZK        User Key  (r) = Recorded By, (t) = Taken By, (c) = Cosigned By    Initials Name Provider Type    ZK Zorre Zeno Kimura, PT Physical Therapist                                            Time Calculation:   Start Time: 1030  Stop Time: 1115  Time Calculation (min): 45 min    Therapy Charges for Today     Code Description Service Date Service Provider Modifiers Qty    31612502050  PT AQUATIC THERAPY EA 15 MIN 2/1/2018 Zorre Zeno Kimura, PT GP 3                    Zorre Zeno Kimura, PT  2/1/2018     "

## 2018-02-06 ENCOUNTER — HOSPITAL ENCOUNTER (OUTPATIENT)
Dept: PHYSICAL THERAPY | Facility: HOSPITAL | Age: 83
Setting detail: THERAPIES SERIES
Discharge: HOME | End: 2018-02-06

## 2018-02-06 DIAGNOSIS — M25.551 RIGHT HIP PAIN: ICD-10-CM

## 2018-02-06 DIAGNOSIS — M51.36 DDD (DEGENERATIVE DISC DISEASE), LUMBAR: Primary | ICD-10-CM

## 2018-02-06 PROCEDURE — 97113 AQUATIC THERAPY/EXERCISES: CPT

## 2018-02-08 ENCOUNTER — HOSPITAL ENCOUNTER (OUTPATIENT)
Dept: PHYSICAL THERAPY | Facility: HOSPITAL | Age: 83
Setting detail: THERAPIES SERIES
Discharge: HOME | End: 2018-02-08

## 2018-02-08 DIAGNOSIS — M25.551 RIGHT HIP PAIN: ICD-10-CM

## 2018-02-08 DIAGNOSIS — M51.36 DDD (DEGENERATIVE DISC DISEASE), LUMBAR: Primary | ICD-10-CM

## 2018-02-08 PROCEDURE — G8980 MOBILITY D/C STATUS: HCPCS | Performed by: PHYSICAL THERAPIST

## 2018-02-08 PROCEDURE — 97113 AQUATIC THERAPY/EXERCISES: CPT | Performed by: PHYSICAL THERAPIST

## 2018-02-08 PROCEDURE — G8979 MOBILITY GOAL STATUS: HCPCS | Performed by: PHYSICAL THERAPIST

## 2018-02-08 NOTE — THERAPY TREATMENT NOTE
Outpatient Physical Therapy Ortho Treatment Note  [facility]     Patient Name: Brittany Villeda  : 1935  MRN: [id number]  Today's Date: 2018      Visit Date: 2018    Visit Dx:    ICD-10-CM ICD-9-CM   1. DDD (degenerative disc disease), lumbar M51.36 722.52   2. Right hip pain M25.551 719.45       Patient Active Problem List   Diagnosis   • Sciatica   • Non-toxic multinodular goiter   • Chronic midline low back pain without sciatica   • Essential hypertension   • Gastroesophageal reflux disease without esophagitis   • Cataract   • Pulmonary hypertension   • Paroxysmal atrial fibrillation   • Diastolic dysfunction   • HUGO (obstructive sleep apnea)   • Trifascicular block   • Leukopenia   • MGUS (monoclonal gammopathy of unknown significance)   • Ulcerative rectosigmoiditis without complication   • Lichen sclerosus        Past Medical History:   Diagnosis Date   • Anemia    • Arthritis    • Asthma    • Chest pain    • Colitis    • COPD (chronic obstructive pulmonary disease)    • Diastolic dysfunction    • Essential hypertension 2016   • GERD (gastroesophageal reflux disease)    • Hypertension    • Hypertensive heart disease    • Hyperthyroidism    • Kidney stone    • Low back pain    • MGUS (monoclonal gammopathy of unknown significance)    • Nephrolithiasis    • Obesity    • Paroxysmal atrial fibrillation    • Peptic ulceration    • Pulmonary hypertension    • Sleep apnea    • Ventricular tachycardia     nonsustained   • Vertigo         Past Surgical History:   Procedure Laterality Date   • CHOLECYSTECTOMY     • COLONOSCOPY  2014    colitis, cryptitis,  tics, NBIH, TA w/low grade dysplasia   • HEMORRHOIDECTOMY     • HYSTERECTOMY     • KNEE ARTHROPLASTY     • MYOMECTOMY     • SHOULDER SURGERY     • SINUS SURGERY     • TONSILLECTOMY                               PT Assessment/Plan       18 2200       PT Assessment    Assessment Comments back and hip feels well. Still a little  shoulder pain from doing the sit to stand thing. . Declines step ups. They bother knees. Pt had dec pain today 3/10. Challenged with SLS Met goals for standing and walking longer.   -SP     PT Plan    PT Plan Comments last visit set for 2/8/18. Has improved with aqua. Does well with program, Doesnt want to do steps. Plan dc Get modified oswestry.    -SP       User Key  (r) = Recorded By, (t) = Taken By, (c) = Cosigned By    Initials Name Provider Type    SP Claudia Johnston, PT Physical Therapist                    Exercises       02/06/18 2200          Subjective Comments    Subjective Comments doing better. shoulder is still a little sore. When asked, she can stand and walk longer.   -SP      Subjective Pain    Able to rate subjective pain? yes  -SP      Pre-Treatment Pain Level 3  -SP      Post-Treatment Pain Level 2  -SP      Subjective Pain Comment I can get up off bench without hands in water.   -SP      Aquatics    Aquatics performed? Yes  -SP      Aquatics LE    Water Walk forward;side;backward   10 minutes at end of rx. deep water indep  -SP      Stretch 2 BKTC 10 sec X 2  -SP      Stretch 3 Calf 20 sec X 2 ea  -SP      Stretch Other 1 trunk flextion at bench with benefit to shoulder ROM and low back stretch   -SP      Stretch Other 2 LAQ at bench - hold for stretch knee/ankle B x 10   -SP      Abdominals noodle   LN x15  -SP      Clams sit to stand at bench 5 x   -SP      Hip Abd/Add 15  -SP      Hip Flex/Ext 15  -SP      March in Place standing with core tightening 20   -SP      Mini Squat SN 20 leg press  -SP      Toe/Heel Raises 10/10   -SP      Uni-Squat 10-10 hip circles cw/ccwb  -SP      Step Ups declined step ups, knee sore   -SP      Bicycle at bench 2'  -SP      Flutter/Scissor bench 2'  -SP      Exercise 1    Exercise Name 1 steps in, lift chair out.   -SP      Additional Comments cont.   -SP      Exercise 2    Exercise Name 2 trunk rotation LN 5 B   -SP      Exercise 3    Exercise Name 3 single  leg stance   -SP      Additional Comments holding B 5 ct x 5   -SP        User Key  (r) = Recorded By, (t) = Taken By, (c) = Cosigned By    Initials Name Provider Type    LAURA Johnston PT Physical Therapist                               PT OP Goals       02/06/18 2200       PT Short Term Goals    STG 1 Patient will report a reduction in pain for 12-24 hours or greater following aquatic therapy session  -SP     STG 1 Progress Met  -SP     STG 2 Patient will perform sit to stand from pool bench without using hands to demonstrate increased functional strength  -SP     STG 2 Progress Met  -SP     STG 2 Progress Comments Met this date. Can do in pool, not on land.   -SP     STG 3 Patient will increase walking tolerance from 15 min to 20 min or greater in order to increase level of physical activity and allow patient to participate in social outings  -SP     STG 3 Progress Met  -SP     STG 3 Progress Comments Pt agrees she can walk up to 20 minutes for tasks.   -SP     Long Term Goals    LTG 1 Patient will improve 30 sec sit to stand test from 2.5 stands to 3 stands to demonstrate improved functional strength and reduced risk of falls  -SP     LTG 1 Progress Met  -SP     LTG 2 Patient will increase standing tolerance from 10 min to 20 min or greater to increase ease with ADLs/household chores  -SP     LTG 2 Progress Met  -SP     LTG 2 Progress Comments Pt subjectively reports she can do this.   -SP     LTG 3 Patient will improve perceived level of disability as measured by the Oswestry from 70% disability to </= 60% disability in order to improve quality of life  -SP     LTG 3 Progress Ongoing  -SP       User Key  (r) = Recorded By, (t) = Taken By, (c) = Cosigned By    Initials Name Provider Type    LAURA Johnston PT Physical Therapist                         Time Calculation:   Start Time: 0905  Stop Time: 0950  Time Calculation (min): 45 min    Therapy Charges for Today     Code Description Service Date  Service Provider Modifiers Qty    11718042973 HC PT AQUATIC THERAPY EA 15 MIN 2/6/2018 Claudia Johnston, PT GP 3                    Claudia Johnston, PT  2/7/2018

## 2018-02-14 ENCOUNTER — OFFICE VISIT (OUTPATIENT)
Dept: FAMILY MEDICINE CLINIC | Facility: CLINIC | Age: 83
End: 2018-02-14

## 2018-02-14 VITALS
SYSTOLIC BLOOD PRESSURE: 136 MMHG | OXYGEN SATURATION: 96 % | HEIGHT: 68 IN | HEART RATE: 64 BPM | DIASTOLIC BLOOD PRESSURE: 70 MMHG | WEIGHT: 244.1 LBS | BODY MASS INDEX: 36.99 KG/M2 | TEMPERATURE: 97.5 F

## 2018-02-14 DIAGNOSIS — D70.8 OTHER NEUTROPENIA (HCC): ICD-10-CM

## 2018-02-14 DIAGNOSIS — R71.8 OTHER ABNORMALITY OF RED BLOOD CELLS: ICD-10-CM

## 2018-02-14 DIAGNOSIS — M54.30 SCIATICA, UNSPECIFIED LATERALITY: ICD-10-CM

## 2018-02-14 DIAGNOSIS — I10 ESSENTIAL HYPERTENSION: Primary | ICD-10-CM

## 2018-02-14 DIAGNOSIS — D47.2 MGUS (MONOCLONAL GAMMOPATHY OF UNKNOWN SIGNIFICANCE): ICD-10-CM

## 2018-02-14 DIAGNOSIS — I48.0 PAROXYSMAL ATRIAL FIBRILLATION (HCC): ICD-10-CM

## 2018-02-14 PROCEDURE — 99214 OFFICE O/P EST MOD 30 MIN: CPT | Performed by: FAMILY MEDICINE

## 2018-02-14 RX ORDER — FERROUS SULFATE 325(65) MG
325 TABLET ORAL
Start: 2018-02-14 | End: 2018-12-21 | Stop reason: SINTOL

## 2018-02-14 NOTE — PROGRESS NOTES
"Subjective   Brittany Villeda is a 82 y.o. female.     Chief Complaint   Patient presents with   • Hypertension     pt is fasting        History of Present Illness    Hypertension.  Follow-up.  Patient continues Lasix daily.  No other anti-hypertensives.    Chronic low back pain with sciatica.  She continues gabapentin for this.  She is going to watch her aerobics which seems be helping.    Chronic leukopenia.  Followed by hematology.  Recent lab work looks good overall.  No anemia.  Iron levels normal.  She's taking the iron sulfate twice a day which is causing diarrhea.  She prefers to take once a day.     colitis.  Followed by GI.    Paroxysmal atrial fibrillation.  Recently saw cardiology.  They recommend continuing the warfarin because of her high risk factors.  She continues with [facility] monthly.  She describes no bleeding episodes.      The following portions of the patient's history were reviewed and updated as appropriate: allergies, current medications, past family history, past medical history, past social history, past surgical history and problem list.          Review of Systems   Constitutional: Negative.    Respiratory: Negative.    Cardiovascular: Negative.    Musculoskeletal: Positive for back pain.   Neurological: Negative.    Psychiatric/Behavioral: Negative.        Objective   Blood pressure 136/70, pulse 64, temperature 97.5 °F (36.4 °C), temperature source Oral, height 172.7 cm (67.99\"), weight 111 kg (244 lb 1.6 oz), SpO2 96 %.  Physical Exam   Constitutional: She appears well-developed and well-nourished. No distress.   Neck: No thyromegaly present.   Cardiovascular: Normal rate, regular rhythm, normal heart sounds and intact distal pulses.    Pulmonary/Chest: Effort normal and breath sounds normal.   Musculoskeletal: She exhibits no edema.   Skin: Skin is warm and dry.   Psychiatric: She has a normal mood and affect. Her behavior is normal. Judgment and thought content normal. "   Nursing note and vitals reviewed.      Assessment/Plan   Brittany was seen today for hypertension.    Diagnoses and all orders for this visit:    Essential hypertension    MGUS (monoclonal gammopathy of unknown significance)  -     ferrous sulfate 325 (65 FE) MG tablet; Take 1 tablet by mouth Daily With Breakfast.    Other neutropenia  -     ferrous sulfate 325 (65 FE) MG tablet; Take 1 tablet by mouth Daily With Breakfast.    Other abnormality of red blood cells   -     ferrous sulfate 325 (65 FE) MG tablet; Take 1 tablet by mouth Daily With Breakfast.    Paroxysmal atrial fibrillation    Sciatica, unspecified laterality      Paroxysmal atrial fibrillation.  She continues warfarin because of her high risk factors.  She will continue with cardiology and also with the warfarin clinic.  She's had no bleeding episodes.  Next    Hypertension.  This point well controlled with Lasix.  She also has history of pulmonary hypertension which is been stable.    Neutropenia.  Previous anemia.  CBC is stable.  I recommend she decrease the iron to once a day.  It is causing diarrhea she believes.    Sciatica.  She continues gabapentin.  Overall doing well.  I'll see her in 3 or 4 months.

## 2018-02-22 ENCOUNTER — HOSPITAL ENCOUNTER (OUTPATIENT)
Dept: CARDIOLOGY | Facility: HOSPITAL | Age: 83
Setting detail: RECURRING SERIES
Discharge: HOME | End: 2018-02-22

## 2018-02-22 PROCEDURE — 85610 PROTHROMBIN TIME: CPT

## 2018-02-22 PROCEDURE — 36416 COLLJ CAPILLARY BLOOD SPEC: CPT

## 2018-03-22 ENCOUNTER — HOSPITAL ENCOUNTER (OUTPATIENT)
Dept: CARDIOLOGY | Facility: HOSPITAL | Age: 83
Setting detail: RECURRING SERIES
Discharge: HOME | End: 2018-03-22

## 2018-03-22 PROCEDURE — 85610 PROTHROMBIN TIME: CPT

## 2018-03-22 PROCEDURE — 36416 COLLJ CAPILLARY BLOOD SPEC: CPT

## 2018-03-29 ENCOUNTER — HOSPITAL ENCOUNTER (OUTPATIENT)
Dept: CARDIOLOGY | Facility: HOSPITAL | Age: 83
Setting detail: RECURRING SERIES
Discharge: HOME | End: 2018-03-29

## 2018-03-29 PROCEDURE — 85610 PROTHROMBIN TIME: CPT

## 2018-03-29 PROCEDURE — 36416 COLLJ CAPILLARY BLOOD SPEC: CPT

## 2018-04-05 ENCOUNTER — HOSPITAL ENCOUNTER (OUTPATIENT)
Dept: CARDIOLOGY | Facility: HOSPITAL | Age: 83
Setting detail: RECURRING SERIES
Discharge: HOME | End: 2018-04-05

## 2018-04-05 PROCEDURE — 85610 PROTHROMBIN TIME: CPT

## 2018-04-05 PROCEDURE — 36416 COLLJ CAPILLARY BLOOD SPEC: CPT

## 2018-04-13 ENCOUNTER — TRANSCRIBE ORDERS (OUTPATIENT)
Dept: PHYSICAL THERAPY | Facility: HOSPITAL | Age: 83
End: 2018-04-13

## 2018-04-13 DIAGNOSIS — Z98.1 S/P LUMBAR FUSION: Primary | ICD-10-CM

## 2018-04-19 ENCOUNTER — HOSPITAL ENCOUNTER (OUTPATIENT)
Dept: CARDIOLOGY | Facility: HOSPITAL | Age: 83
Setting detail: RECURRING SERIES
Discharge: HOME | End: 2018-04-19

## 2018-04-19 PROCEDURE — 85610 PROTHROMBIN TIME: CPT

## 2018-04-19 PROCEDURE — 36416 COLLJ CAPILLARY BLOOD SPEC: CPT

## 2018-04-26 ENCOUNTER — HOSPITAL ENCOUNTER (OUTPATIENT)
Dept: PHYSICAL THERAPY | Facility: HOSPITAL | Age: 83
Setting detail: THERAPIES SERIES
Discharge: HOME | End: 2018-04-26

## 2018-04-26 ENCOUNTER — DOCUMENTATION (OUTPATIENT)
Dept: PHYSICAL THERAPY | Facility: HOSPITAL | Age: 83
End: 2018-04-26

## 2018-04-26 DIAGNOSIS — M54.50 CHRONIC BILATERAL LOW BACK PAIN WITHOUT SCIATICA: Primary | ICD-10-CM

## 2018-04-26 DIAGNOSIS — M25.511 BILATERAL SHOULDER PAIN, UNSPECIFIED CHRONICITY: ICD-10-CM

## 2018-04-26 DIAGNOSIS — G89.29 CHRONIC BILATERAL LOW BACK PAIN WITHOUT SCIATICA: Primary | ICD-10-CM

## 2018-04-26 DIAGNOSIS — M19.012 OSTEOARTHRITIS OF LEFT SHOULDER, UNSPECIFIED OSTEOARTHRITIS TYPE: ICD-10-CM

## 2018-04-26 DIAGNOSIS — M25.512 BILATERAL SHOULDER PAIN, UNSPECIFIED CHRONICITY: ICD-10-CM

## 2018-04-26 DIAGNOSIS — Z98.1 HISTORY OF LUMBAR FUSION: ICD-10-CM

## 2018-04-26 PROCEDURE — 97162 PT EVAL MOD COMPLEX 30 MIN: CPT | Performed by: PHYSICAL THERAPIST

## 2018-04-26 PROCEDURE — G8979 MOBILITY GOAL STATUS: HCPCS | Performed by: PHYSICAL THERAPIST

## 2018-04-26 PROCEDURE — G8978 MOBILITY CURRENT STATUS: HCPCS | Performed by: PHYSICAL THERAPIST

## 2018-04-26 NOTE — THERAPY EVALUATION
Outpatient Physical Therapy Ortho Initial Evaluation  [facility]     Patient Name: Brittany Villeda  : 1935  MRN: [id number]  Today's Date: 2018      Visit Date: 2018    Patient Active Problem List   Diagnosis   • Sciatica   • Non-toxic multinodular goiter   • Chronic midline low back pain without sciatica   • Essential hypertension   • Gastroesophageal reflux disease without esophagitis   • Cataract   • Pulmonary hypertension   • Paroxysmal atrial fibrillation   • Diastolic dysfunction   • HUGO (obstructive sleep apnea)   • Trifascicular block   • Leukopenia   • MGUS (monoclonal gammopathy of unknown significance)   • Ulcerative rectosigmoiditis without complication   • Lichen sclerosus        Past Medical History:   Diagnosis Date   • Anemia    • Arthritis    • Asthma    • Chest pain    • Colitis    • COPD (chronic obstructive pulmonary disease)    • Diastolic dysfunction    • Essential hypertension 2016   • GERD (gastroesophageal reflux disease)    • Hypertension    • Hypertensive heart disease    • Hyperthyroidism    • Kidney stone    • Low back pain    • MGUS (monoclonal gammopathy of unknown significance)    • Nephrolithiasis    • Obesity    • Paroxysmal atrial fibrillation    • Peptic ulceration    • Pulmonary hypertension    • Sleep apnea    • Ventricular tachycardia     nonsustained   • Vertigo         Past Surgical History:   Procedure Laterality Date   • BACK SURGERY      lumbar fusion   • CHOLECYSTECTOMY     • COLONOSCOPY  2014    colitis, cryptitis,  tics, NBIH, TA w/low grade dysplasia   • HEMORRHOIDECTOMY     • HYSTERECTOMY     • KNEE ARTHROPLASTY     • MYOMECTOMY     • SHOULDER SURGERY     • SINUS SURGERY     • TONSILLECTOMY         Visit Dx:     ICD-10-CM ICD-9-CM   1. Chronic bilateral low back pain without sciatica M54.5 724.2    G89.29 338.29   2. History of lumbar fusion Z98.1 V45.4   3. Bilateral shoulder pain, unspecified chronicity M25.511 719.41     M25.512    4. Osteoarthritis of right shoulder, unspecified osteoarthritis type M19.011 715.91             Patient History     Row Name 04/26/18 1050             History    Chief Complaint Pain  -JS      Type of Pain Back pain;Shoulder pain  -JS      Brief Description of Current Complaint Pt presents with extensive medical history  with c/o bilateral shoulder & low back pain, reporting history of low back pain with lumbar surgery several years ago (laminectomy & fusion in 2000 per pt report)  and bilateral shoulder pain with history of R shoulder surgery in 2012.  Pt had been seen by PT for aquatic therapy to address low back & hip pain until Feb 2018 finding much benefit from aquatic therapy.  Pt attempted to use pool at [facility] [place] 3x, though water was cold & pt did not tolerate this environment.  Pt has not exercised since that time & notices increased pain as a result.  Seen by Dr. Pereira recently & referred back to aquatic therapy at [facility][1] at this time.  -JS      Patient/Caregiver Goals Relieve pain;Improve mobility  -JS      Hand Dominance right-handed  -JS      What clinical tests have you had for this problem? X-ray  -JS      Results of Clinical Tests DDD lumbar spine  -JS      Patient seeing anyone else for problem(s)? Yes  -JS         Pain     Pain Location Back;Shoulder  -JS      Pain at Present 9  -JS      Pain at Best 8  -JS      Pain at Worst 9  -JS      What Performance Factors Make the Current Problem(s) WORSE? Shoulder: reaching, lifting. Back: Standing, turning in bed  -JS      What Performance Factors Make the Current Problem(s) BETTER? Shoulder: Tylenol, massage shoulder. Back: Back brace, Tylenol  -JS      Tolerance Time- Standing 15 min  -JS      Difficulties at work? not working  -JS      Difficulties with ADL's? Standing to do dishes. Reaching to do hair & to cabinets  -JS      Difficulties with recreational activities? only attends [employer]- limited socially due to back pain  -JS          Fall Risk Assessment    Any falls in the past year: No  -JS         Daily Activities    Primary Language English  -JS      Are you able to read Yes  -JS      Are you able to write Yes  -JS      Pt Participated in POC and Goals Yes  -JS         Safety    Are you being hurt, hit, or frightened by anyone at home or in your life? No  -JS      Are you being neglected by a caregiver No  -JS        User Key  (r) = Recorded By, (t) = Taken By, (c) = Cosigned By    Initials Name Provider Type    KIM Gant PT Physical Therapist                PT Ortho     Row Name 04/26/18 1050       Subjective Comments    Subjective Comments Pt arrives with c/o bilateral shoulder pain (L>R) & back pain.  Had attempted to use [employer] x 3 following last PT sessions in Feb, but water temp too cold.  No exercise since that time & symptoms have worsened.    -JS       Subjective Pain    Able to rate subjective pain? yes  -JS    Pre-Treatment Pain Level 9  -JS    Post-Treatment Pain Level 9  -JS    Subjective Pain Comment Back, bilateral shoulders  -JS       Posture/Observations    Forward Head Moderate  -JS    Cervical Lordosis Moderate  -JS    Thoracic Kyphosis Moderate  -JS    Rounded Shoulders Moderate  -JS    Genu valgus Bilateral:;Moderate  -JS    Posture/Observations Comments Stands with slightly flexed trunk using rolling walker  -JS       Quarter Clearing    Quarter Clearing Upper Quarter Clearing  -JS       Sensory Screen for Light Touch- Upper Quarter Clearing    C4 (posterior shoulder) Bilateral:;Intact  -JS    C5 (lateral upper arm) Bilateral:;Intact  -JS    C6 (tip of thumb) Bilateral:;Intact  -JS    C7 (tip of 3rd finger) Bilateral:;Intact  -JS    C8 (tip of 5th finger) Bilateral:;Intact  -JS    T1 (medial lower arm) Bilateral:;Intact  -JS       Cervical/Shoulder ROM Screen    Cervical flexion Normal   No reproduction of shoulder pain  -JS    Cervical extension Impaired   50%. No reproduction of shoulder pain   -JS    Cervical lateral flexion Impaired   50% bilaterally. No reproduction of shoulder pain  -JS    Cervical rotation Normal;Impaired   Normal  to R, 50% to L. No reproduction of shoulder pain  -JS    Cervical quadrant (Spurling's) --   (+) for neck pain. No reproduction of shoulder pain  -JS       Sensory Screen for Light Touch- Lower Quarter Clearing    L1 (inguinal area) Bilateral:;Intact  -JS    L2 (anterior mid thigh) Bilateral:;Intact  -JS    L3 (distal anterior thigh) Bilateral:;Intact  -JS    L4 (medial lower leg/foot) Bilateral:;Intact  -JS    L5 (lateral lower leg/great toe) Bilateral:;Intact  -JS    S1 (bottom of foot) Bilateral:;Intact  -JS       Myotomal Screen- Lower Quarter Clearing    Hip flexion (L2) Bilateral:;3+ (Fair +)   within limited ROM. Fair core stabilization during MMT  -JS    Knee extension (L3) Bilateral:;5 (Normal)  -JS    Ankle DF (L4) Bilateral:;5 (Normal)  -JS    Ankle PF (S1) Bilateral:;5 (Normal)  -JS    Knee flexion (S2) Bilateral:;5 (Normal)  -JS       Lumbar ROM Screen- Lower Quarter Clearing    Lumbar Flexion Impaired   50%  -JS    Lumbar Extension Impaired   10% of motion  -JS    Lumbar Lateral Flexion Impaired   50%  -JS       General ROM    RT Upper Ext Rt Shoulder ABduction;Rt Shoulder Flexion;Rt Shoulder External Rotation;Rt Shoulder Internal Rotation  -JS    LT Upper Ext Lt Shoulder ABduction;Lt Shoulder Flexion;Lt Shoulder External Rotation;Lt Shoulder Internal Rotation  -JS       Right Upper Ext    Rt Shoulder Abduction AROM 87  -JS    Rt Shoulder Abduction PROM 115  -JS    Rt Shoulder Flexion AROM 60  -JS    Rt Shoulder Flexion PROM 120  -JS    Rt Shoulder External Rotation AROM Functionally to post head with difficulty  -JS    Rt Shoulder Internal Rotation AROM Functionally to PSIS with difficulty  -JS    Rt Upper Extremity Comments  Pain during R shoulder A/PROM  -JS       Left Upper Ext    Lt Shoulder Abduction AROM 90  -JS    Lt Shoulder Abduction PROM 115  -JS     Lt Shoulder Flexion AROM 70  -JS    Lt Shoulder Flexion PROM 120  -JS    Lt Shoulder External Rotation AROM Functionally to C8 with difficulty  -JS    Lt Shoulder Internal Rotation AROM Functionally to L4-5 level with difficulty  -JS    Lt Upper Extremity Comments  Pain during L shoulder A/PROM  -JS       MMT (Manual Muscle Testing)    Additional Documentation General Assessment (Manual Muscle Testing) (Group)  -JS       General Assessment (Manual Muscle Testing)    General Manual Muscle Testing (MMT) Assessment upper extremity strength deficits identified;lower extremity strength deficits identified  -JS       Upper Extremity (Manual Muscle Testing)    Upper Extremity: Manual Muscle Testing (MMT) left shoulder strength deficit;right shoulder strength deficit  -JS       Left Shoulder (Manual Muscle Testing)    Left Shoulder Manual Muscle Testing (MMT) flexion;abduction;external rotation;internal rotation  -JS    MMT: Flexion, Left Shoulder flexion  -JS    MMT, Gross Movement: Left Shoulder Flexion (3-/5) fair minus  -JS    MMT: ABduction, Left Shoulder abduction  -JS    MMT, Gross Movement: Left Shoulder ABduction (3-/5) fair minus  -JS    MMT: Internal Rotation, Left Shoulder internal rotation  -JS    MMT, Gross Movement: Left Shoulder Internal Rotation (4+/5) good plus  -JS    MMT: External Rotation, Left Shoulder external rotation  -JS    MMT, Gross Movement: Left Shoulder External Rotation (4/5) good  -JS       Right Shoulder (Manual Muscle Testing)    Right Shoulder Manual Muscle Testing (MMT) flexion;abduction;external rotation;internal rotation  -JS    MMT: Flexion, Right Shoulder flexion  -JS    MMT, Gross Movement: Right Shoulder Flexion (3-/5) fair minus  -JS    MMT: ABduction, Right Shoulder abduction  -JS    MMT, Gross Movement: Right Shoulder ABduction (3-/5) fair minus  -JS    MMT: Internal Rotation, Right Shoulder internal rotation  -JS    MMT, Gross Movement: Right Shoulder Internal Rotation (4/5)  good  -JS    MMT: External Rotation, Right Shoulder external rotation  -JS    MMT, Gross Movement: Right Shoulder External Rotation (3+/5) fair plus  -JS       Lower Extremity (Manual Muscle Testing)    Lower Extremity: Manual Muscle Testing (MMT) left hip strength deficit;right hip strength deficit  -JS       Left Hip (Manual Muscle Testing)    Left Hip Manual Muscle Testing (MMT) abduction  -JS    MMT: ABduction, Left Hip abduction  -JS    MMT, Gross Movement: Left Hip ABduction (3+/5) fair plus  -JS       Right Hip (Manual Muscle Testing)    Right Hip Manual Muscle Testing (MMT) abduction  -JS    MMT: ABduction, Right Hip abduction  -JS    MMT, Gross Movement: Right Hip ABduction (3+/5) fair plus  -JS       Flexibility    Flexibility Tested? Lower Extremity  -JS       Lower Extremity Flexibility    Hamstrings Bilateral:;Moderately limited  -JS    Hip Flexors Bilateral:;Moderately limited   assessed in sidelying  -JS    Quadriceps Bilateral:;Moderately limited   assessed in sidelying  -JS       Gait/Stairs Assessment/Training    Hopkins Level (Gait) independent  -JS    Assistive Device (Gait) walker, 4-wheeled   Rollator  -JS    Deviations/Abnormal Patterns (Gait) bilateral deviations;ovi decreased;stride length decreased  -JS    Bilateral Gait Deviations forward flexed posture;heel strike decreased;weight shift ability decreased  -JS      User Key  (r) = Recorded By, (t) = Taken By, (c) = Cosigned By    Initials Name Provider Type    KIM Gant PT Physical Therapist                      Therapy Education  Education Details: Reviewed benefits of aquatic therapy.  Instructed pt to consider long-term plan to continue aquatic therapy following d/c from PT- pt is considering joining [employer] to continue aquatic exericse following PT sessions.  Given: Symptoms/condition management  Program: New  How Provided: Verbal  Provided to: Patient  Level of Understanding: Verbalized           PT OP Goals     Row  Name 04/26/18 1050          PT Short Term Goals    STG Date to Achieve 05/10/18  -JS     STG 1 Patient will perform 45 min session of aquatic exercise without increased symptoms.  -JS     STG 1 Progress New  -JS     STG 2 Pt will report reduction of shoulder & back pain from 9/10 to 6/10 or better following aquatic therapy sessions.  -JS     STG 2 Progress New  -JS     STG 3 Patient will demonstrate good core stabilization with ambulation & core exercises in the pool.  -JS     STG 3 Progress New  -JS        Long Term Goals    LTG Date to Achieve 06/07/18  -JS     LTG 1 Patient will be demonstrate independence with an advanced aquatic program and community resources to facilitate self-management of symptoms  -JS     LTG 1 Progress New  -JS     LTG 2 Patient will increase core and lower extremity strength and balance by improvement in the 5 Times Sit to Stand Test from 48 seconds to 40 seconds or less with moderate use of UEs to decrease strain on bilateral shoulders.  -JS     LTG 2 Progress New  -JS     LTG 3 Patient will improve score on Modified Oswestry Outcome Measures from 62 % to 50% or less as indication of reduced functional disability  -JS     LTG 3 Progress New  -JS     LTG 4 Patient will improve score on SPADI from 68% to 58% or less to indicate improve perceived disability of shoulders.  -JS     LTG 4 Progress New  -JS     LTG 5 Pt will demonstrate improvement in shoulder AROM & strength to allow reaching to at least 90 degrees flexion & to top/posterior head with improved ease & without significantly increasing symptoms for improved tolerance to reaching to cabinets, dressing & washing hair during ADLs   -        Time Calculation    PT Goal Re-Cert Due Date 07/26/18  -       User Key  (r) = Recorded By, (t) = Taken By, (c) = Cosigned By    Initials Name Provider Type    KIM Gant PT Physical Therapist                PT Assessment/Plan     Row Name 04/26/18 1050          PT Assessment     Functional Limitations Decreased safety during functional activities;Impaired locomotion;Limitation in home management;Limitations in community activities;Performance in self-care ADL;Performance in leisure activities;Limitations in functional capacity and performance  -JS     Impairments Balance;Range of motion;Muscle strength;Pain;Posture;Gait  -JS     Assessment Comments Pt is an 82 y/o female referred to PT for aquatic therapy for LBP with history of lumbar fusion & shoulder pain with diagnosis of L shoulder DJD.  Pt was seen by PT at [facility] for aquatic therapy in the past with last appointment Feb 2018.  Pt attempted to use [employer] on 3 occasions following aquatic therapy sessions, though reports that cold temperature of the pool did not allow tolerance to exercise in this environment.  Pt stopped all exercises at that time, and reports worsening of symptoms to now include chief complaints of bilateral shoulder pain & LBP.  Pt presents with decreased shoulder & lumbar ROM, decreased UE/LE & core strength, decreased flexibility and decreased functional balance.  Pt ambulates with Rollator with flexed trunk posture & performs sit to stand transfer with significant use of UEs for assistance resulting in increased use of shoulders during functional mobility.  Pt demonstrates decreased core stabilization during functional mobility as well which can contribute to increased strain on low back.  Pt will benefit to resume skilled PT to address evolving pain, current impairments & provide education on appropriate exercises to resume.  Pt is considering joining [facility] gym following therapy sessions to allow her to continue aquatic exercise independently for long-term symptom management.  -JS     Please refer to paper survey for additional self-reported information Yes  -JS     Rehab Potential Good  -JS     Patient/caregiver participated in establishment of treatment plan and goals Yes  -JS     Patient would  benefit from skilled therapy intervention Yes  -JS        PT Plan    PT Frequency 2x/week  -JS     Predicted Duration of Therapy Intervention (OT Eval) 6 weeks  -JS     Planned CPT's? PT EVAL AQUA: 97292;PT AQUATIC THERAPY EA 15 MIN: 43819;PT GAIT TRAINING EA 15 MIN: 17369;PT THER PROC EA 15 MIN: 70160;PT NEUROMUSC RE-EDUCATION EA 15 MIN: 30335  -JS     Physical Therapy Interventions (Optional Details) aquatics exercise  -JS     PT Plan Comments Aquatic therapy 2x/week to address shoulder ROM & strengthening, gait training, balance, core strength/stabilization, LE strengthening & flexibility.  Pt considering joining [employer] following PT sessions.  -JS       User Key  (r) = Recorded By, (t) = Taken By, (c) = Cosigned By    Initials Name Provider Type    KIM Gant, PT Physical Therapist                  Exercises     Row Name 04/26/18 1050             Subjective Comments    Subjective Comments Pt arrives with c/o bilateral shoulder pain (L>R) & back pain.  Had attempted to use [employer] pool x 3 following last PT sessions in Feb, but water temp too cold.  No exercise since that time & symptoms have worsened.    -JS         Subjective Pain    Able to rate subjective pain? yes  -JS      Pre-Treatment Pain Level 9  -JS      Post-Treatment Pain Level 9  -JS      Subjective Pain Comment Back, bilateral shoulders  -JS        User Key  (r) = Recorded By, (t) = Taken By, (c) = Cosigned By    Initials Name Provider Type    KIM Gant, PT Physical Therapist                        Outcome Measure Options: 5x Sit to Stand, Modifed Nabeel, Other Outcome Measure  5 Times Sit to Stand  5 Times Sit to Stand (seconds): 48 seconds  5 Times Sit to Stand Comments: Significant use of bilateral UEs on arm rests, decreased core stabilization with excessive trunk movements, (+) pain  Modified Oswestry  Modified Oswestry Score/Comments: 62%  Other Outcome Measure Tool Used  Other Outcome Measure Tool Comments: SPADI=  68%      Time Calculation:   Start Time: 1050  Stop Time: 1135  Time Calculation (min): 45 min     Therapy Charges for Today     Code Description Service Date Service Provider Modifiers Qty    53033993380 HC PT MOBILITY CURRENT 4/26/2018 Rolanda Gant, PT GP, CL 1    80163629136 HC PT MOBILITY PROJECTED 4/26/2018 Rolanda Gant, PT GP, CK 1    38400827272 HC PT AQUA EVAL MOD COMPLEXITY 3 4/26/2018 Rolanda Gant, PT GP 1          PT G-Codes  PT Professional Judgement Used?: Yes  Outcome Measure Options: 5x Sit to Stand, Modifed Owestry, Other Outcome Measure  Score: 48 sec 5x Sit to Stand/62% Oswestry/ 68% SPADI  Functional Limitation: Mobility: Walking and moving around  Mobility: Walking and Moving Around Current Status (): At least 60 percent but less than 80 percent impaired, limited or restricted  Mobility: Walking and Moving Around Goal Status (): At least 40 percent but less than 60 percent impaired, limited or restricted         Rolanda Gant PT  4/26/2018

## 2018-04-30 ENCOUNTER — HOSPITAL ENCOUNTER (OUTPATIENT)
Dept: PHYSICAL THERAPY | Facility: HOSPITAL | Age: 83
Setting detail: THERAPIES SERIES
Discharge: HOME | End: 2018-04-30

## 2018-04-30 DIAGNOSIS — M25.551 RIGHT HIP PAIN: ICD-10-CM

## 2018-04-30 DIAGNOSIS — M19.012 OSTEOARTHRITIS OF LEFT SHOULDER, UNSPECIFIED OSTEOARTHRITIS TYPE: ICD-10-CM

## 2018-04-30 DIAGNOSIS — Z98.1 HISTORY OF LUMBAR FUSION: ICD-10-CM

## 2018-04-30 DIAGNOSIS — G89.29 CHRONIC BILATERAL LOW BACK PAIN WITHOUT SCIATICA: Primary | ICD-10-CM

## 2018-04-30 DIAGNOSIS — M25.512 BILATERAL SHOULDER PAIN, UNSPECIFIED CHRONICITY: ICD-10-CM

## 2018-04-30 DIAGNOSIS — M25.511 BILATERAL SHOULDER PAIN, UNSPECIFIED CHRONICITY: ICD-10-CM

## 2018-04-30 DIAGNOSIS — M54.50 CHRONIC BILATERAL LOW BACK PAIN WITHOUT SCIATICA: Primary | ICD-10-CM

## 2018-04-30 DIAGNOSIS — M51.36 DDD (DEGENERATIVE DISC DISEASE), LUMBAR: ICD-10-CM

## 2018-04-30 PROCEDURE — 97113 AQUATIC THERAPY/EXERCISES: CPT | Performed by: PHYSICAL THERAPIST

## 2018-05-03 ENCOUNTER — HOSPITAL ENCOUNTER (OUTPATIENT)
Dept: PHYSICAL THERAPY | Facility: HOSPITAL | Age: 83
Setting detail: THERAPIES SERIES
End: 2018-05-03

## 2018-05-03 ENCOUNTER — HOSPITAL ENCOUNTER (OUTPATIENT)
Dept: CARDIOLOGY | Facility: HOSPITAL | Age: 83
Setting detail: RECURRING SERIES
Discharge: HOME | End: 2018-05-03

## 2018-05-03 PROCEDURE — 36416 COLLJ CAPILLARY BLOOD SPEC: CPT

## 2018-05-03 PROCEDURE — 85610 PROTHROMBIN TIME: CPT

## 2018-05-04 ENCOUNTER — OFFICE VISIT (OUTPATIENT)
Dept: FAMILY MEDICINE CLINIC | Facility: CLINIC | Age: 83
End: 2018-05-04

## 2018-05-04 VITALS
OXYGEN SATURATION: 96 % | SYSTOLIC BLOOD PRESSURE: 134 MMHG | TEMPERATURE: 98.2 F | HEART RATE: 76 BPM | WEIGHT: 239.5 LBS | BODY MASS INDEX: 36.3 KG/M2 | HEIGHT: 68 IN | DIASTOLIC BLOOD PRESSURE: 70 MMHG

## 2018-05-04 DIAGNOSIS — J01.90 ACUTE NON-RECURRENT SINUSITIS, UNSPECIFIED LOCATION: Primary | ICD-10-CM

## 2018-05-04 PROCEDURE — 99213 OFFICE O/P EST LOW 20 MIN: CPT | Performed by: FAMILY MEDICINE

## 2018-05-04 RX ORDER — AZITHROMYCIN 250 MG/1
TABLET, FILM COATED ORAL
Qty: 6 TABLET | Refills: 0 | Status: SHIPPED | OUTPATIENT
Start: 2018-05-04 | End: 2018-05-04 | Stop reason: SDUPTHER

## 2018-05-04 RX ORDER — AZITHROMYCIN 250 MG/1
TABLET, FILM COATED ORAL
Qty: 6 TABLET | Refills: 0 | Status: SHIPPED | OUTPATIENT
Start: 2018-05-04 | End: 2018-06-13

## 2018-05-04 RX ORDER — GABAPENTIN 300 MG/1
300 CAPSULE ORAL
Qty: 180 CAPSULE | Refills: 1 | Status: SHIPPED | OUTPATIENT
Start: 2018-05-04 | End: 2018-11-01 | Stop reason: SDUPTHER

## 2018-05-04 NOTE — PROGRESS NOTES
"Wendi Villeda is a 83 y.o. female.     Chief Complaint   Patient presents with   • Cough     x 2 days        History of Present Illness    3 days of cough and congestion.  Not increased sinus pressure with yellow nasal discharge.  No shortness of breath.  No fever.  Feeling rundown.  Moderate and persistent symptoms.  She's taking over-the-counter Delsym.  History of leukopenia and monoclonal gammopathy of unknown significance.  Followed by hematology.  Also on warfarin, followed by cardiology.  Recent INR was reportedly normal at 2.3.      The following portions of the patient's history were reviewed and updated as appropriate: allergies, current medications, past family history, past medical history, past social history, past surgical history and problem list.          Review of Systems   Constitutional: Negative for fatigue and fever.   HENT: Positive for congestion, rhinorrhea, sinus pain, sinus pressure and sore throat.    Respiratory: Positive for cough.    Gastrointestinal: Negative.    Skin: Negative for rash.       Objective   Blood pressure 134/70, pulse 76, temperature 98.2 °F (36.8 °C), temperature source Oral, height 172.7 cm (67.99\"), weight 109 kg (239 lb 8 oz), SpO2 96 %.  Physical Exam   Constitutional: No distress.   No acute distress.  Nontoxic.   HENT:   Right Ear: Tympanic membrane, external ear and ear canal normal.   Left Ear: Tympanic membrane, external ear and ear canal normal.   Nose: Nose normal.   Mouth/Throat: Oropharynx is clear and moist. No oropharyngeal exudate.   Eyes: Conjunctivae are normal. Right eye exhibits no discharge. Left eye exhibits no discharge. No scleral icterus.   Cardiovascular: Normal rate.    Pulmonary/Chest: Effort normal and breath sounds normal. No stridor. No respiratory distress. She has no wheezes. She has no rales.   No tachypnea   Lymphadenopathy:     She has no cervical adenopathy.   Skin: No rash noted.   Nursing note and vitals " reviewed.      Assessment/Plan   Brittany was seen today for cough.    Diagnoses and all orders for this visit:    Acute non-recurrent sinusitis, unspecified location    Other orders  -     gabapentin (NEURONTIN) 300 MG capsule; Take 1 capsule by mouth 2 (Two) Times a Day.  -     Discontinue: azithromycin (ZITHROMAX Z-YUNG) 250 MG tablet; Take 2 tablets the first day, then 1 tablet daily for 4 days.  -     azithromycin (ZITHROMAX Z-YUNG) 250 MG tablet; Take 2 tablets the first day, then 1 tablet daily for 4 days.        Viral URI.  I am concerned about developing sinusitis.  Likely not early pneumonia.  Patient has risk factors for worsening illness including leukopenia.  I'm recommending azithromycin five-day pack.  May interfere with warfarin, recent INR 2.3.  She will continue the over-the-counter Delsym.  She will seek medical attention with shortness of breath, high fever, or worsening symptoms.

## 2018-05-07 ENCOUNTER — APPOINTMENT (OUTPATIENT)
Dept: PHYSICAL THERAPY | Facility: HOSPITAL | Age: 83
End: 2018-05-07

## 2018-05-07 ENCOUNTER — OFFICE VISIT (OUTPATIENT)
Dept: GASTROENTEROLOGY | Facility: CLINIC | Age: 83
End: 2018-05-07

## 2018-05-07 VITALS
DIASTOLIC BLOOD PRESSURE: 78 MMHG | HEIGHT: 68 IN | TEMPERATURE: 98.8 F | WEIGHT: 237.2 LBS | BODY MASS INDEX: 35.95 KG/M2 | SYSTOLIC BLOOD PRESSURE: 112 MMHG

## 2018-05-07 DIAGNOSIS — K21.9 GASTROESOPHAGEAL REFLUX DISEASE WITHOUT ESOPHAGITIS: ICD-10-CM

## 2018-05-07 DIAGNOSIS — K51.30 ULCERATIVE RECTOSIGMOIDITIS WITHOUT COMPLICATION (HCC): Primary | ICD-10-CM

## 2018-05-07 PROCEDURE — 99213 OFFICE O/P EST LOW 20 MIN: CPT | Performed by: INTERNAL MEDICINE

## 2018-05-07 NOTE — PROGRESS NOTES
Chief Complaint   Patient presents with   • Follow-up   • Ulcerative rectosigmoiditis       Brittany Villeda is a  83 y.o. 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  Last EGD 2014 - no esophageal abnormalities.    Bowels moving regularly - soft BM - no blood in stool.  Has a little urgency with breakfast - doesn't happen any other time.      No issues with acid reflux at present. She doesn't eat late to prevent acid reflux.     She has been having trouble with a sinus infection - Currently on erythromycin.   She takes tylenol for OA.    HPI  Past Medical History:   Diagnosis Date   • Anemia    • Arthritis    • Asthma    • Chest pain    • Colitis    • COPD (chronic obstructive pulmonary disease)    • Diastolic dysfunction    • Essential hypertension 5/12/2016   • GERD (gastroesophageal reflux disease)    • Hypertension    • Hypertensive heart disease    • Hyperthyroidism    • Kidney stone    • Low back pain    • MGUS (monoclonal gammopathy of unknown significance)    • Nephrolithiasis    • Obesity    • Paroxysmal atrial fibrillation    • Peptic ulceration    • Pulmonary hypertension    • Sleep apnea    • Ventricular tachycardia     nonsustained   • Vertigo      Past Surgical History:   Procedure Laterality Date   • BACK SURGERY      lumbar fusion   • CHOLECYSTECTOMY     • COLONOSCOPY  06/16/2014    colitis, cryptitis,  tics, NBIH, TA w/low grade dysplasia   • HEMORRHOIDECTOMY     • HYSTERECTOMY     • KNEE ARTHROPLASTY     • MYOMECTOMY     • SHOULDER SURGERY     • SINUS SURGERY     • TONSILLECTOMY         Current Outpatient Prescriptions:   •  Acetaminophen (PAIN RELIEVER PO), Take  by mouth., Disp: , Rfl:   •  azithromycin (ZITHROMAX Z-YUNG) 250 MG tablet, Take 2 tablets the first day, then 1 tablet daily for 4 days., Disp: 6 tablet, Rfl: 0  •  calcium carbonate (OS-ALIDA) 600 MG tablet, Take 600 mg by mouth Daily., Disp: , Rfl:   •  ferrous sulfate 325 (65 FE) MG  tablet, Take 1 tablet by mouth Daily With Breakfast., Disp: , Rfl:   •  furosemide (LASIX) 20 MG tablet, Take 40 mg by mouth Every Morning. 20mg in the PM, Disp: , Rfl:   •  gabapentin (NEURONTIN) 300 MG capsule, Take 1 capsule by mouth 2 (Two) Times a Day., Disp: 180 capsule, Rfl: 1  •  mesalamine (LIALDA) 1.2 g EC tablet, Take 4 tablets by mouth Daily., Disp: 360 tablet, Rfl: 3  •  mometasone (ELOCON) 0.1 % ointment, Apply 1 application topically 2 (Two) Times a Day., Disp: , Rfl:   •  omeprazole (priLOSEC) 20 MG capsule, Take 1 capsule by mouth Daily., Disp: 90 capsule, Rfl: 3  •  vitamin B-12 (CYANOCOBALAMIN) 1000 MCG tablet, Take 1,000 mcg by mouth Daily., Disp: , Rfl:   •  warfarin (COUMADIN) 5 MG tablet, Take 1 tablet by mouth Daily., Disp: 104 tablet, Rfl: 3  PRN Meds:.  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[employer]     Social History Main Topics   • Smoking status: Former Smoker     Packs/day: 1.50     Years: 10.00   • Smokeless tobacco: Never Used   • Alcohol use No   • Drug use: No   • Sexual activity: No     Other Topics Concern   • Not on file     Social History Narrative   • No narrative on file     Family History   Problem Relation Age of Onset   • Diabetes Mother    • Breast cancer Sister    • Kidney cancer Sister    • Heart disease Sister    • Prostate cancer Brother    • Prostate cancer Brother    • Prostate cancer Brother      Review of Systems   Constitutional: Positive for appetite change. Negative for  fever and unexpected weight change.   HENT: Positive for postnasal drip, sinus pain, sinus pressure and sore throat.    Gastrointestinal: Negative for abdominal pain, blood in stool and diarrhea.   Musculoskeletal: Positive for arthralgias.   All other systems reviewed and are negative.    Vitals:    05/07/18 0829   BP: 112/78   Temp: 98.8 °F (37.1 °C)     1    05/07/18  0829   Weight: 108 kg (237 lb 3.2 oz)     Physical Exam   Constitutional: She appears well-developed and well-nourished.   HENT:   Head: Normocephalic and atraumatic.   Eyes: No scleral icterus.   Abdominal: Soft. She exhibits no distension and no mass. There is tenderness.       Neurological: She is alert.   Skin: Skin is warm and dry.   Psychiatric: She has a normal mood and affect.     No images are attached to the encounter.  Diagnoses and all orders for this visit:    Ulcerative rectosigmoiditis without complication    Gastroesophageal reflux disease without esophagitis         Plan:  - stable on current medications  - labs from 1/18 reviewed  - recommend hepatitis A vaccination-rx given  - 6 month f/u

## 2018-05-10 ENCOUNTER — APPOINTMENT (OUTPATIENT)
Dept: PHYSICAL THERAPY | Facility: HOSPITAL | Age: 83
End: 2018-05-10

## 2018-05-14 ENCOUNTER — APPOINTMENT (OUTPATIENT)
Dept: PHYSICAL THERAPY | Facility: HOSPITAL | Age: 83
End: 2018-05-14

## 2018-05-21 ENCOUNTER — HOSPITAL ENCOUNTER (OUTPATIENT)
Dept: PHYSICAL THERAPY | Facility: HOSPITAL | Age: 83
Setting detail: THERAPIES SERIES
Discharge: HOME | End: 2018-05-21

## 2018-05-21 DIAGNOSIS — G89.29 CHRONIC BILATERAL LOW BACK PAIN WITHOUT SCIATICA: Primary | ICD-10-CM

## 2018-05-21 DIAGNOSIS — M25.551 RIGHT HIP PAIN: ICD-10-CM

## 2018-05-21 DIAGNOSIS — M25.512 BILATERAL SHOULDER PAIN, UNSPECIFIED CHRONICITY: ICD-10-CM

## 2018-05-21 DIAGNOSIS — M19.012 OSTEOARTHRITIS OF LEFT SHOULDER, UNSPECIFIED OSTEOARTHRITIS TYPE: ICD-10-CM

## 2018-05-21 DIAGNOSIS — Z98.1 HISTORY OF LUMBAR FUSION: ICD-10-CM

## 2018-05-21 DIAGNOSIS — M51.36 DDD (DEGENERATIVE DISC DISEASE), LUMBAR: ICD-10-CM

## 2018-05-21 DIAGNOSIS — M54.50 CHRONIC BILATERAL LOW BACK PAIN WITHOUT SCIATICA: Primary | ICD-10-CM

## 2018-05-21 DIAGNOSIS — M25.511 BILATERAL SHOULDER PAIN, UNSPECIFIED CHRONICITY: ICD-10-CM

## 2018-05-21 PROCEDURE — 97113 AQUATIC THERAPY/EXERCISES: CPT | Performed by: PHYSICAL THERAPIST

## 2018-05-21 NOTE — THERAPY TREATMENT NOTE
Outpatient Physical Therapy Ortho Treatment Note   [facility]     Patient Name: Brittany Villeda  : 1935  MRN: [id number]  Today's Date: 2018      Visit Date: 2018    Visit Dx:    ICD-10-CM ICD-9-CM   1. Chronic bilateral low back pain without sciatica M54.5 724.2    G89.29 338.29   2. History of lumbar fusion Z98.1 V45.4   3. Bilateral shoulder pain, unspecified chronicity M25.511 719.41    M25.512    4. Osteoarthritis of left shoulder, unspecified osteoarthritis type M19.012 715.91   5. DDD (degenerative disc disease), lumbar M51.36 722.52   6. Right hip pain M25.551 719.45       Patient Active Problem List   Diagnosis   • Sciatica   • Non-toxic multinodular goiter   • Chronic midline low back pain without sciatica   • Essential hypertension   • Gastroesophageal reflux disease without esophagitis   • Cataract   • Pulmonary hypertension   • Paroxysmal atrial fibrillation   • Diastolic dysfunction   • HUGO (obstructive sleep apnea)   • Trifascicular block   • Leukopenia   • MGUS (monoclonal gammopathy of unknown significance)   • Ulcerative rectosigmoiditis without complication   • Lichen sclerosus        Past Medical History:   Diagnosis Date   • Anemia    • Arthritis    • Asthma    • Chest pain    • Colitis    • COPD (chronic obstructive pulmonary disease)    • Diastolic dysfunction    • Essential hypertension 2016   • GERD (gastroesophageal reflux disease)    • Hypertension    • Hypertensive heart disease    • Hyperthyroidism    • Kidney stone    • Low back pain    • MGUS (monoclonal gammopathy of unknown significance)    • Nephrolithiasis    • Obesity    • Paroxysmal atrial fibrillation    • Peptic ulceration    • Pulmonary hypertension    • Sleep apnea    • Ventricular tachycardia     nonsustained   • Vertigo         Past Surgical History:   Procedure Laterality Date   • BACK SURGERY      lumbar fusion   • CHOLECYSTECTOMY     • COLONOSCOPY  2014    colitis, cryptitis,   tics, NBIH, TA w/low grade dysplasia   • HEMORRHOIDECTOMY     • HYSTERECTOMY     • KNEE ARTHROPLASTY     • MYOMECTOMY     • SHOULDER SURGERY     • SINUS SURGERY     • TONSILLECTOMY                               PT Assessment/Plan     Row Name 05/21/18 1122          PT Assessment    Assessment Comments Brittany reports improvement in her back and hip, however having consiistent pain in the right shoulder.  She returns to PT after 3 week lapse in treatment due to sinus infection.  -HEMALATHA       User Key  (r) = Recorded By, (t) = Taken By, (c) = Cosigned By    Initials Name Provider Type    HEMALATHA Yin, PT Physical Therapist                    Exercises     Row Name 05/21/18 0900             Subjective Pain    Able to rate subjective pain? yes  -HEMALATHA      Pre-Treatment Pain Level 7  -HEMALATHA      Post-Treatment Pain Level 5  -HEMALATHA      Subjective Pain Comment My shoulder pain always runs at a 6-7, it just hurts.  My back is doing better. My hip hurts once in a while.  -HEMALATHA         Aquatics    Aquatics performed? Yes  -HEMALATHA         Aquatics LE    Water Walk forward;side   Independent  -HEMALATHA      Bicycle 1 min  -HEMALATHA      Flutter/Scissor 15/15  -HEMALATHA         Aquatics UE    Stretch 1 Shoulder Circles 10/10 R  -HEMALATHA      Stretch 2 Scapular Squeezes 5 sec X 5  -HEMALATHA      Stretch 3 --  -HEMALATHA      Stretch Other 1 PROM R shld Flexion X 6  -HEMALATHA      Stretch Other 2 Elbow Flexion/ extension 2X 10   -HEMALATHA      Scap Retraction/Row Rows X 15  -HEMALATHA      Scaption Seated forward reach with LN support X 12  -HEMALATHA      External Rotation @ 90 Shldr Flexion deep water to 80 degrees X 10  -HEMALATHA      External Rotation @ 0 12X ea  -HEMALATHA      I's/T's/Y's --  -HEMALATHA      Abdominals LN  X 15  -HEMALATHA      March in Place --  -HEMALATHA      Mini Squat 15X  -HEMALATHA        User Key  (r) = Recorded By, (t) = Taken By, (c) = Cosigned By    Initials Name Provider Type    HEMALATHA Yin, PT Physical Therapist                                            Time Calculation:   Start Time: 0900  Stop Time:  0945  Time Calculation (min): 45 min  Total Timed Code Minutes- PT: 40 minute(s)    Therapy Charges for Today     Code Description Service Date Service Provider Modifiers Qty    76581633391 HC PT AQUATIC THERAPY EA 15 MIN 5/21/2018 Dima Yin, PT GP 3                    Dima Yin, PT  5/21/2018

## 2018-05-23 ENCOUNTER — HOSPITAL ENCOUNTER (OUTPATIENT)
Dept: PHYSICAL THERAPY | Facility: HOSPITAL | Age: 83
Setting detail: THERAPIES SERIES
Discharge: HOME | End: 2018-05-23

## 2018-05-23 DIAGNOSIS — M25.512 BILATERAL SHOULDER PAIN, UNSPECIFIED CHRONICITY: ICD-10-CM

## 2018-05-23 DIAGNOSIS — M19.012 OSTEOARTHRITIS OF LEFT SHOULDER, UNSPECIFIED OSTEOARTHRITIS TYPE: ICD-10-CM

## 2018-05-23 DIAGNOSIS — G89.29 CHRONIC BILATERAL LOW BACK PAIN WITHOUT SCIATICA: Primary | ICD-10-CM

## 2018-05-23 DIAGNOSIS — M54.50 CHRONIC BILATERAL LOW BACK PAIN WITHOUT SCIATICA: Primary | ICD-10-CM

## 2018-05-23 DIAGNOSIS — M25.551 RIGHT HIP PAIN: ICD-10-CM

## 2018-05-23 DIAGNOSIS — Z98.1 HISTORY OF LUMBAR FUSION: ICD-10-CM

## 2018-05-23 DIAGNOSIS — M25.511 BILATERAL SHOULDER PAIN, UNSPECIFIED CHRONICITY: ICD-10-CM

## 2018-05-23 DIAGNOSIS — M51.36 DDD (DEGENERATIVE DISC DISEASE), LUMBAR: ICD-10-CM

## 2018-05-23 PROCEDURE — 97113 AQUATIC THERAPY/EXERCISES: CPT | Performed by: PHYSICAL THERAPIST

## 2018-05-23 NOTE — THERAPY TREATMENT NOTE
Outpatient Physical Therapy Ortho Treatment Note   [facility]     Patient Name: Brittany Villeda  : 1935  MRN: [id number]  Today's Date: 2018      Visit Date: 2018    Visit Dx:    ICD-10-CM ICD-9-CM   1. Chronic bilateral low back pain without sciatica M54.5 724.2    G89.29 338.29   2. History of lumbar fusion Z98.1 V45.4   3. Bilateral shoulder pain, unspecified chronicity M25.511 719.41    M25.512    4. Osteoarthritis of left shoulder, unspecified osteoarthritis type M19.012 715.91   5. DDD (degenerative disc disease), lumbar M51.36 722.52   6. Right hip pain M25.551 719.45       Patient Active Problem List   Diagnosis   • Sciatica   • Non-toxic multinodular goiter   • Chronic midline low back pain without sciatica   • Essential hypertension   • Gastroesophageal reflux disease without esophagitis   • Cataract   • Pulmonary hypertension   • Paroxysmal atrial fibrillation   • Diastolic dysfunction   • HUGO (obstructive sleep apnea)   • Trifascicular block   • Leukopenia   • MGUS (monoclonal gammopathy of unknown significance)   • Ulcerative rectosigmoiditis without complication   • Lichen sclerosus        Past Medical History:   Diagnosis Date   • Anemia    • Arthritis    • Asthma    • Chest pain    • Colitis    • COPD (chronic obstructive pulmonary disease)    • Diastolic dysfunction    • Essential hypertension 2016   • GERD (gastroesophageal reflux disease)    • Hypertension    • Hypertensive heart disease    • Hyperthyroidism    • Kidney stone    • Low back pain    • MGUS (monoclonal gammopathy of unknown significance)    • Nephrolithiasis    • Obesity    • Paroxysmal atrial fibrillation    • Peptic ulceration    • Pulmonary hypertension    • Sleep apnea    • Ventricular tachycardia     nonsustained   • Vertigo         Past Surgical History:   Procedure Laterality Date   • BACK SURGERY      lumbar fusion   • CHOLECYSTECTOMY     • COLONOSCOPY  2014    colitis, cryptitis,   tics, NBIH, TA w/low grade dysplasia   • HEMORRHOIDECTOMY     • HYSTERECTOMY     • KNEE ARTHROPLASTY     • MYOMECTOMY     • SHOULDER SURGERY     • SINUS SURGERY     • TONSILLECTOMY                               PT Assessment/Plan     Row Name 05/23/18 1630          PT Assessment    Assessment Comments Brittany reports R shoulder is improving.  Noted AROM improved after manual work today.  -HEMALATHA       User Key  (r) = Recorded By, (t) = Taken By, (c) = Cosigned By    Initials Name Provider Type    HEMALATHA Yin, PT Physical Therapist                Modalities     Row Name 05/23/18 1300             Subjective Pain    Able to rate subjective pain? yes  -HEMALATHA      Pre-Treatment Pain Level 2  -HEMALATHA      Subjective Pain Comment My shoulder is getting better. I had a little pain in back and hip this morning, but not now.  -HEMALATHA        User Key  (r) = Recorded By, (t) = Taken By, (c) = Cosigned By    Initials Name Provider Type    HEMALATHA Yin, PT Physical Therapist                Exercises     Row Name 05/23/18 1300             Subjective Pain    Able to rate subjective pain? yes  -HEMALATHA      Pre-Treatment Pain Level 2  -HEMALATHA      Subjective Pain Comment My shoulder is getting better. I had a little pain in back and hip this morning, but not now.  -HEMALATHA         Aquatics    Aquatics performed? Yes  -HEMALATHA         Aquatics LE    Stretch 1 Hamstrings 20 sec ea SN  -HEMALATHA      Toe/Heel Raises -/15X B  -HEMALATHA      Step Ups Leg Press lg foam ring X 10 e.  -HEMALATHA         Aquatics UE    Water Walk forward;side  -HEMALATHA      Stretch 1 Pendulums 10/10 R  -HEMALATHA      Stretch 2 Scapular Squeezes 5 sec X 5  -HEMALATHA      Stretch Other 1 A/AROM R shld Flexion X 10  -HEMALATHA      Stretch Other 2 Elbow Flexion/ extension 15X holding one paddle L5  -HEMALATHA      Scap Retraction/Row Rows L3 paddle 2X 15  -HEMALATHA      Scaption --  -HEMALATHA      External Rotation @ 90 Shldr Flexion deep water to 80 degrees X 10  -HEMALATHA      External Rotation @ 0 L1 paddle X 15, no paddle X 15  -HEMALATHA      Abdominals  LN  X 20  -HEMALATHA      Hip Abd/Add 20X ea  -HEMALATHA      March in Place 12X   -HEMALATHA      Mini Squat 15X  -HEMALATHA         Exercise 1    Exercise Name 1 Manual R shoulder-G-H distraction with oscillations, Inferior and AP glides, upper trap brief STM.  -HEMALATHA        User Key  (r) = Recorded By, (t) = Taken By, (c) = Cosigned By    Initials Name Provider Type    HEMALATHA Dima Yin, PT Physical Therapist                                            Time Calculation:   Start Time: 1443  Stop Time: 1522  Time Calculation (min): 39 min  Total Timed Code Minutes- PT: 38 minute(s)    Therapy Charges for Today     Code Description Service Date Service Provider Modifiers Qty    03481659987 HC PT AQUATIC THERAPY EA 15 MIN 5/23/2018 Dima Yin, PT GP 3                    Dima Yin, PT  5/23/2018

## 2018-05-29 ENCOUNTER — HOSPITAL ENCOUNTER (OUTPATIENT)
Dept: PHYSICAL THERAPY | Facility: HOSPITAL | Age: 83
Setting detail: THERAPIES SERIES
Discharge: HOME | End: 2018-05-29

## 2018-05-29 DIAGNOSIS — M25.551 RIGHT HIP PAIN: ICD-10-CM

## 2018-05-29 DIAGNOSIS — Z98.1 HISTORY OF LUMBAR FUSION: ICD-10-CM

## 2018-05-29 DIAGNOSIS — M19.012 OSTEOARTHRITIS OF LEFT SHOULDER, UNSPECIFIED OSTEOARTHRITIS TYPE: ICD-10-CM

## 2018-05-29 DIAGNOSIS — M25.511 BILATERAL SHOULDER PAIN, UNSPECIFIED CHRONICITY: ICD-10-CM

## 2018-05-29 DIAGNOSIS — G89.29 CHRONIC BILATERAL LOW BACK PAIN WITHOUT SCIATICA: Primary | ICD-10-CM

## 2018-05-29 DIAGNOSIS — M51.36 DDD (DEGENERATIVE DISC DISEASE), LUMBAR: ICD-10-CM

## 2018-05-29 DIAGNOSIS — M54.50 CHRONIC BILATERAL LOW BACK PAIN WITHOUT SCIATICA: Primary | ICD-10-CM

## 2018-05-29 DIAGNOSIS — M25.512 BILATERAL SHOULDER PAIN, UNSPECIFIED CHRONICITY: ICD-10-CM

## 2018-05-29 PROCEDURE — 97113 AQUATIC THERAPY/EXERCISES: CPT | Performed by: PHYSICAL THERAPIST

## 2018-05-29 NOTE — THERAPY TREATMENT NOTE
Outpatient Physical Therapy Ortho Treatment Note   [facility]     Patient Name: Brittany Villeda  : 1935  MRN: [id number]  Today's Date: 2018      Visit Date: 2018    Visit Dx:    ICD-10-CM ICD-9-CM   1. Chronic bilateral low back pain without sciatica M54.5 724.2    G89.29 338.29   2. History of lumbar fusion Z98.1 V45.4   3. Bilateral shoulder pain, unspecified chronicity M25.511 719.41    M25.512    4. Osteoarthritis of left shoulder, unspecified osteoarthritis type M19.012 715.91   5. DDD (degenerative disc disease), lumbar M51.36 722.52   6. Right hip pain M25.551 719.45       Patient Active Problem List   Diagnosis   • Sciatica   • Non-toxic multinodular goiter   • Chronic midline low back pain without sciatica   • Essential hypertension   • Gastroesophageal reflux disease without esophagitis   • Cataract   • Pulmonary hypertension   • Paroxysmal atrial fibrillation   • Diastolic dysfunction   • HUGO (obstructive sleep apnea)   • Trifascicular block   • Leukopenia   • MGUS (monoclonal gammopathy of unknown significance)   • Ulcerative rectosigmoiditis without complication   • Lichen sclerosus        Past Medical History:   Diagnosis Date   • Anemia    • Arthritis    • Asthma    • Chest pain    • Colitis    • COPD (chronic obstructive pulmonary disease)    • Diastolic dysfunction    • Essential hypertension 2016   • GERD (gastroesophageal reflux disease)    • Hypertension    • Hypertensive heart disease    • Hyperthyroidism    • Kidney stone    • Low back pain    • MGUS (monoclonal gammopathy of unknown significance)    • Nephrolithiasis    • Obesity    • Paroxysmal atrial fibrillation    • Peptic ulceration    • Pulmonary hypertension    • Sleep apnea    • Ventricular tachycardia     nonsustained   • Vertigo         Past Surgical History:   Procedure Laterality Date   • BACK SURGERY      lumbar fusion   • CHOLECYSTECTOMY     • COLONOSCOPY  2014    colitis, cryptitis,   tics, NBIH, TA w/low grade dysplasia   • HEMORRHOIDECTOMY     • HYSTERECTOMY     • KNEE ARTHROPLASTY     • MYOMECTOMY     • SHOULDER SURGERY     • SINUS SURGERY     • TONSILLECTOMY                               PT Assessment/Plan     Row Name 05/29/18 1442          PT Assessment    Assessment Comments Brittany is pleased with her progress, reporting no pain today, however still weak in right shoulder and B LEs.  [facility]ine [facility] exercise on her own as she sees the benefit.  -HEMALATHA       User Key  (r) = Recorded By, (t) = Taken By, (c) = Cosigned By    Initials Name Provider Type    HEMALATHA Yin, PT Physical Therapist                    Exercises     Row Name 05/29/18 1300             Subjective Comments    Subjective Comments I’m doing much better.  -HEMALATHA         Subjective Pain    Able to rate subjective pain? yes  -HEMALATHA      Pre-Treatment Pain Level 0  -HEMALATHA      Post-Treatment Pain Level 0  -HEMALATHA         Aquatics    Aquatics performed? Yes  -HEMALATHA         Aquatics LE    Toe/Heel Raises -/2X15 B  -HEMALATHA      Step Ups Leg Press lg foam ring X 10 e.  -HEMALATHA         Aquatics UE    Water Walk forward;side   Independent  -HEMALATHA      Stretch 1 Shoulder circles 5/5 ea  -HEMALATHA      Stretch 2 Shld horiz Abd/Add X 10  -HEMALATHA      Stretch 3 Arms in V position over small noodle with pulses 10 sec X 5  -HEMALATHA      Stretch Other 1 A/AROM R shld Flexion 2 X 10  -HEMALATHA      Stretch Other 2 Elbow Flexion/ extension 15X holding one paddle L5  -HEMALATHA      Scap Retraction/Row Rows L3 paddle 2X 15  -HEMALATHA      External Rotation @ 90 Shldr Flexion deep water to 80 degrees X 10  -HEMALATHA      External Rotation @ 0 L1 paddle X 15, no paddle X 15  -HEMALATHA      Abdominals LN  X 20  -HEMALATHA      Hip Abd/Add 20X ea  -HEMALATHA      March in Place 12X   -HEMALATHA      Mini Squat 20X, Sit to Dtands X 12 with noodle support  -HEMALATHA      Toe/Heel Raises 2 X 12  -HEMALATHA        User Key  (r) = Recorded By, (t) = Taken By, (c) = Cosigned By    Initials Name Provider Type    HEMALATHA  Dima Yin, PT Physical Therapist                                            Time Calculation:   Start Time: 1300  Stop Time: 1342  Time Calculation (min): 42 min  Total Timed Code Minutes- PT: 40 minute(s)    Therapy Charges for Today     Code Description Service Date Service Provider Modifiers Qty    96681733728 HC PT AQUATIC THERAPY EA 15 MIN 5/29/2018 Dima Yin, PT GP 3                    Dima Yin, PT  5/29/2018

## 2018-05-31 ENCOUNTER — HOSPITAL ENCOUNTER (OUTPATIENT)
Dept: PHYSICAL THERAPY | Facility: HOSPITAL | Age: 83
Setting detail: THERAPIES SERIES
Discharge: HOME | End: 2018-05-31

## 2018-05-31 ENCOUNTER — HOSPITAL ENCOUNTER (OUTPATIENT)
Dept: CARDIOLOGY | Facility: HOSPITAL | Age: 83
Setting detail: RECURRING SERIES
Discharge: HOME | End: 2018-05-31

## 2018-05-31 DIAGNOSIS — Z98.1 HISTORY OF LUMBAR FUSION: ICD-10-CM

## 2018-05-31 DIAGNOSIS — M25.511 BILATERAL SHOULDER PAIN, UNSPECIFIED CHRONICITY: ICD-10-CM

## 2018-05-31 DIAGNOSIS — M19.012 OSTEOARTHRITIS OF LEFT SHOULDER, UNSPECIFIED OSTEOARTHRITIS TYPE: ICD-10-CM

## 2018-05-31 DIAGNOSIS — G89.29 CHRONIC BILATERAL LOW BACK PAIN WITHOUT SCIATICA: Primary | ICD-10-CM

## 2018-05-31 DIAGNOSIS — M51.36 DDD (DEGENERATIVE DISC DISEASE), LUMBAR: ICD-10-CM

## 2018-05-31 DIAGNOSIS — M25.512 BILATERAL SHOULDER PAIN, UNSPECIFIED CHRONICITY: ICD-10-CM

## 2018-05-31 DIAGNOSIS — M25.551 RIGHT HIP PAIN: ICD-10-CM

## 2018-05-31 DIAGNOSIS — M54.50 CHRONIC BILATERAL LOW BACK PAIN WITHOUT SCIATICA: Primary | ICD-10-CM

## 2018-05-31 PROCEDURE — 36416 COLLJ CAPILLARY BLOOD SPEC: CPT

## 2018-05-31 PROCEDURE — 85610 PROTHROMBIN TIME: CPT

## 2018-05-31 PROCEDURE — 97113 AQUATIC THERAPY/EXERCISES: CPT | Performed by: PHYSICAL THERAPIST

## 2018-06-13 ENCOUNTER — OFFICE VISIT (OUTPATIENT)
Dept: FAMILY MEDICINE CLINIC | Facility: CLINIC | Age: 83
End: 2018-06-13

## 2018-06-13 VITALS
HEIGHT: 68 IN | SYSTOLIC BLOOD PRESSURE: 120 MMHG | HEART RATE: 78 BPM | DIASTOLIC BLOOD PRESSURE: 73 MMHG | TEMPERATURE: 97.4 F | OXYGEN SATURATION: 93 % | BODY MASS INDEX: 36.77 KG/M2 | WEIGHT: 242.6 LBS

## 2018-06-13 DIAGNOSIS — D47.2 MGUS (MONOCLONAL GAMMOPATHY OF UNKNOWN SIGNIFICANCE): ICD-10-CM

## 2018-06-13 DIAGNOSIS — Z00.00 MEDICARE ANNUAL WELLNESS VISIT, SUBSEQUENT: Primary | ICD-10-CM

## 2018-06-13 DIAGNOSIS — I48.0 PAROXYSMAL ATRIAL FIBRILLATION (HCC): ICD-10-CM

## 2018-06-13 DIAGNOSIS — I10 ESSENTIAL HYPERTENSION: ICD-10-CM

## 2018-06-13 DIAGNOSIS — M54.31 SCIATICA OF RIGHT SIDE: ICD-10-CM

## 2018-06-13 DIAGNOSIS — K21.9 GASTROESOPHAGEAL REFLUX DISEASE WITHOUT ESOPHAGITIS: ICD-10-CM

## 2018-06-13 PROCEDURE — G0439 PPPS, SUBSEQ VISIT: HCPCS | Performed by: FAMILY MEDICINE

## 2018-06-13 PROCEDURE — 99213 OFFICE O/P EST LOW 20 MIN: CPT | Performed by: FAMILY MEDICINE

## 2018-06-13 PROCEDURE — 96160 PT-FOCUSED HLTH RISK ASSMT: CPT | Performed by: FAMILY MEDICINE

## 2018-06-13 PROCEDURE — G0009 ADMIN PNEUMOCOCCAL VACCINE: HCPCS | Performed by: FAMILY MEDICINE

## 2018-06-13 PROCEDURE — 90732 PPSV23 VACC 2 YRS+ SUBQ/IM: CPT | Performed by: FAMILY MEDICINE

## 2018-06-13 NOTE — PROGRESS NOTES
QUICK REFERENCE INFORMATION:  The ABCs of the Annual Wellness Visit    Subsequent Medicare Wellness Visit    HEALTH RISK ASSESSMENT    1935    Recent Hospitalizations:  No hospitalization(s) within the last year..        Current Medical Providers:  Patient Care Team:  Mega Esparza MD as PCP - General (Family Medicine)  Micaela English MD as Consulting Physician (Gastroenterology)  Mary Grace Culp MD as Consulting Physician (Cardiology)  Devon Valadez MD as Consulting Physician (Hematology and Oncology)  Janice Castaneda MD (Inactive) as Referring Physician (Family Medicine)        Smoking Status:  History   Smoking Status   • Former Smoker   • Packs/day: 1.50   • Years: 10.00   Smokeless Tobacco   • Never Used       Alcohol Consumption:  History   Alcohol Use No       Depression Screen:   PHQ-2/PHQ-9 Depression Screening 6/13/2018   Little interest or pleasure in doing things 0   Feeling down, depressed, or hopeless 0   Trouble falling or staying asleep, or sleeping too much 0   Feeling tired or having little energy 0   Poor appetite or overeating 0   Feeling bad about yourself - or that you are a failure or have let yourself or your family down 0   Trouble concentrating on things, such as reading the newspaper or watching television 0   Moving or speaking so slowly that other people could have noticed. Or the opposite - being so fidgety or restless that you have been moving around a lot more than usual 0   Thoughts that you would be better off dead, or of hurting yourself in some way 0   Total Score 0   If you checked off any problems, how difficult have these problems made it for you to do your work, take care of things at home, or get along with other people? -       Health Habits and Functional and Cognitive Screening:  Functional & Cognitive Status 6/13/2018   Do you have difficulty preparing food and eating? No   Do you have difficulty bathing yourself, getting dressed or grooming yourself? No   Do  you have difficulty using the toilet? No   Do you have difficulty moving around from place to place? No   Do you have trouble with steps or getting out of a bed or a chair? No   In the past year have you fallen or experienced a near fall? No   Current Diet Well Balanced Diet   Dental Exam Not up to date   Eye Exam Up to date   Exercise (times per week) 7 times per week   Current Exercise Activities Include Swimming   Do you need help using the phone?  No   Are you deaf or do you have serious difficulty hearing?  No   Do you need help with transportation? No   Do you need help shopping? No   Do you need help preparing meals?  No   Do you need help with housework?  No   Do you need help with laundry? No   Do you need help taking your medications? No   Do you need help managing money? No   Do you ever drive or ride in a car without wearing a seat belt? No   Have you felt unusual stress, anger or loneliness in the last month? No   Who do you live with? Alone   If you need help, do you have trouble finding someone available to you? No   Have you been bothered in the last four weeks by sexual problems? No   Do you have difficulty concentrating, remembering or making decisions? No           Does the patient have evidence of cognitive impairment? No    Aspirin use counseling: Does not need ASA (and currently is not on it)      Recent Lab Results:  CMP:  Lab Results   Component Value Date    GLU 94 01/02/2018    BUN 16 01/15/2018    CREATININE 0.83 01/15/2018    EGFRIFNONA 51 (L) 01/02/2018    EGFRIFAFRI 80 01/15/2018    BCR 19.3 01/15/2018     01/15/2018    K 4.2 01/15/2018    CO2 28.1 01/15/2018    CALCIUM 10.3 (H) 01/15/2018    PROTENTOTREF 7.2 01/15/2018    ALBUMIN 3.4 01/15/2018    ALBUMIN 4.00 01/15/2018    LABGLOBREF 3.8 01/15/2018    LABIL2 0.9 01/15/2018    LABIL2 1.1 01/15/2018    BILITOT 1.0 01/15/2018    ALKPHOS 63 01/15/2018    AST 15 01/15/2018    ALT 7 01/15/2018     Lipid Panel:  Lab Results   Component  Value Date    CHOL 141 12/25/2017    TRIG 55 12/25/2017    HDL 64 (H) 12/25/2017    VLDL 11 12/25/2017    LDLHDL 1.03 12/25/2017     HbA1c:       Visual Acuity:  No exam data present    Age-appropriate Screening Schedule:  Refer to the list below for future screening recommendations based on patient's age, sex and/or medical conditions. Orders for these recommended tests are listed in the plan section. The patient has been provided with a written plan.    Health Maintenance   Topic Date Due   • ZOSTER VACCINE (2 of 2) 08/15/2011   • PNEUMOCOCCAL VACCINES (65+ LOW/MEDIUM RISK) (2 of 2 - PPSV23) 05/02/2018   • INFLUENZA VACCINE  08/01/2018   • MAMMOGRAM  01/30/2020   • TDAP/TD VACCINES (2 - Td) 01/01/2022   • COLONOSCOPY  06/16/2024        Immunization History   Administered Date(s) Administered   • Flu Vaccine High Dose PF 65YR+ 10/03/2017   • Hepatitis A 05/24/2018   • Pneumococcal Conjugate 13-Valent (PCV13) 05/02/2017   • Pneumococcal Polysaccharide (PPSV23) 06/13/2018   • Zostavax 06/20/2011         Subjective   History of Present Illness    Brittany Villeda is a 83 y.o. female who presents for an Subsequent Wellness Visit.    The following portions of the patient's history were reviewed and updated as appropriate: allergies, current medications, past family history, past medical history, past social history, past surgical history and problem list.    Outpatient Medications Prior to Visit   Medication Sig Dispense Refill   • Acetaminophen (PAIN RELIEVER PO) Take  by mouth.     • calcium carbonate (OS-ALIDA) 600 MG tablet Take 600 mg by mouth Daily.     • ferrous sulfate 325 (65 FE) MG tablet Take 1 tablet by mouth Daily With Breakfast.     • furosemide (LASIX) 20 MG tablet Take 40 mg by mouth Every Morning. 20mg in the PM     • gabapentin (NEURONTIN) 300 MG capsule Take 1 capsule by mouth 2 (Two) Times a Day. 180 capsule 1   • mesalamine (LIALDA) 1.2 g EC tablet Take 4 tablets by mouth Daily. 360 tablet 3   •  "mometasone (ELOCON) 0.1 % ointment Apply 1 application topically 2 (Two) Times a Day.     • omeprazole (priLOSEC) 20 MG capsule Take 1 capsule by mouth Daily. 90 capsule 3   • vitamin B-12 (CYANOCOBALAMIN) 1000 MCG tablet Take 1,000 mcg by mouth Daily.     • warfarin (COUMADIN) 5 MG tablet Take 1 tablet by mouth Daily. 104 tablet 3   • azithromycin (ZITHROMAX Z-YUNG) 250 MG tablet Take 2 tablets the first day, then 1 tablet daily for 4 days. 6 tablet 0     No facility-administered medications prior to visit.        Patient Active Problem List   Diagnosis   • Sciatica   • Non-toxic multinodular goiter   • Chronic midline low back pain without sciatica   • Essential hypertension   • Gastroesophageal reflux disease without esophagitis   • Cataract   • Pulmonary hypertension   • Paroxysmal atrial fibrillation   • Diastolic dysfunction   • HUGO (obstructive sleep apnea)   • Trifascicular block   • Leukopenia   • MGUS (monoclonal gammopathy of unknown significance)   • Ulcerative rectosigmoiditis without complication   • Lichen sclerosus       Advance Care Planning:  has an advance directive - a copy HAS NOT been provided. Have asked the patient to send this to us to add to record.    Identification of Risk Factors:  Risk factors include: cardiovascular risk. Chronic pain. Higher fall risk.      Review of Systems    Compared to one year ago, the patient feels her physical health is the same.  Compared to one year ago, the patient feels her mental health is the same.    Objective     Physical Exam    Vitals:    06/13/18 1349   BP: 120/73   Pulse: 78   Temp: 97.4 °F (36.3 °C)   TempSrc: Oral   SpO2: 93%   Weight: 110 kg (242 lb 9.6 oz)   Height: 172.7 cm (67.99\")       Patient's Body mass index is 36.9 kg/m². BMI is above normal parameters. Recommendations include: exercise counseling.      Assessment/Plan   Patient Self-Management and Personalized Health Advice  The patient has been provided with information about: exercise, " prevention of cardiac or vascular disease and designing advance directives and preventive services including:   · Advance directive, Pneumococcal vaccine .    Visit Diagnoses:    ICD-10-CM ICD-9-CM   1. Medicare annual wellness visit, subsequent Z00.00 V70.0   2. Paroxysmal atrial fibrillation I48.0 427.31   3. Essential hypertension I10 401.9   4. MGUS (monoclonal gammopathy of unknown significance) D47.2 273.1   5. Gastroesophageal reflux disease without esophagitis K21.9 530.81   6. Sciatica of right side M54.31 724.3       Orders Placed This Encounter   Procedures   • Pneumococcal Polysaccharide Vaccine 23-Valent Greater Than or Equal To 3yo Subcutaneous / IM       Outpatient Encounter Prescriptions as of 6/13/2018   Medication Sig Dispense Refill   • Acetaminophen (PAIN RELIEVER PO) Take  by mouth.     • calcium carbonate (OS-ALIDA) 600 MG tablet Take 600 mg by mouth Daily.     • ferrous sulfate 325 (65 FE) MG tablet Take 1 tablet by mouth Daily With Breakfast.     • furosemide (LASIX) 20 MG tablet Take 40 mg by mouth Every Morning. 20mg in the PM     • gabapentin (NEURONTIN) 300 MG capsule Take 1 capsule by mouth 2 (Two) Times a Day. 180 capsule 1   • mesalamine (LIALDA) 1.2 g EC tablet Take 4 tablets by mouth Daily. 360 tablet 3   • mometasone (ELOCON) 0.1 % ointment Apply 1 application topically 2 (Two) Times a Day.     • omeprazole (priLOSEC) 20 MG capsule Take 1 capsule by mouth Daily. 90 capsule 3   • vitamin B-12 (CYANOCOBALAMIN) 1000 MCG tablet Take 1,000 mcg by mouth Daily.     • warfarin (COUMADIN) 5 MG tablet Take 1 tablet by mouth Daily. 104 tablet 3   • [DISCONTINUED] azithromycin (ZITHROMAX Z-YUNG) 250 MG tablet Take 2 tablets the first day, then 1 tablet daily for 4 days. 6 tablet 0     No facility-administered encounter medications on file as of 6/13/2018.        Reviewed use of high risk medication in the elderly: yes  Reviewed for potential of harmful drug interactions in the elderly:  yes    Follow Up:  Return in about 6 months (around 12/13/2018) for Recheck.     An After Visit Summary and PPPS with all of these plans were given to the patient.

## 2018-06-13 NOTE — PROGRESS NOTES
"Wendi Villeda is a 83 y.o. female.     Chief Complaint   Patient presents with   • Hypertension     follow up        History of Present Illness    Paroxysmal atrial fibrillation.  Patient continues warfarin prescribed by cardiology.    Hypertension.  Patient is on no blood pressure medication other than perhaps furosemide.  Blood pressure today is normal.    Ulcerative rectosigmoiditis, without complication.  Followed by specialty. She continues mesalamine.    GERD.  Continues omeprazole 20 mg day.  Also followed by gastroenterology, see above.     Monoclonal gammopathy of unknown significance.  Followed by hematology.  CBC stable about 6 months ago.    Sciatica with chronic back pain.  Right-sided sciatica.  She's been on gabapentin 300 mg twice day for some time.  She is not sure is making any difference.  The symptoms will come and go.    The following portions of the patient's history were reviewed and updated as appropriate: allergies, current medications, past family history, past medical history, past social history, past surgical history and problem list.          Review of Systems   Constitutional: Negative.    Respiratory: Negative.    Cardiovascular: Negative.    Musculoskeletal: Positive for back pain.   Neurological: Negative.    Psychiatric/Behavioral: Negative.        Objective   Blood pressure 120/73, pulse 78, temperature 97.4 °F (36.3 °C), temperature source Oral, height 172.7 cm (67.99\"), weight 110 kg (242 lb 9.6 oz), SpO2 93 %.  Physical Exam   Constitutional: She appears well-developed and well-nourished. No distress.   Neck: No thyromegaly present.   Cardiovascular: Normal rate, regular rhythm, normal heart sounds and intact distal pulses.    Appears to be in sinus rhythm today.   Pulmonary/Chest: Effort normal and breath sounds normal.   Musculoskeletal: She exhibits no edema.   Skin: Skin is warm and dry.   Psychiatric: She has a normal mood and affect. Her behavior is " normal. Judgment and thought content normal.   Nursing note and vitals reviewed.      Assessment/Plan   Brittany was seen today for hypertension.    Diagnoses and all orders for this visit:    Medicare annual wellness visit, subsequent    Paroxysmal atrial fibrillation    Essential hypertension    MGUS (monoclonal gammopathy of unknown significance)    Gastroesophageal reflux disease without esophagitis    Sciatica of right side    Other orders  -     Pneumococcal Polysaccharide Vaccine 23-Valent Greater Than or Equal To 3yo Subcutaneous / IM        Paroxysmal atrial fibrillation.  Patient continues warfarin prescribed by cardiology.  Appears to be in sinus rhythm today.    Hypertension.  Patient is on no blood pressure medication other than perhaps furosemide.  Blood pressure today is normal.    Ulcerative rectosigmoiditis, without complication.  Followed by specialty.  She continues mesalamine.    GERD.  Continues omeprazole 20 mg day.  Also followed by gastroenterology, see above.  I'll recommend continuing the omeprazole 20 mg a day.  Patient states doesn't seem to make a difference in terms her symptoms.  However given the warfarin use, he'll be reasonable to continue the PPI.  To help prevent a stomach ulcer.    Monoclonal gammopathy of unknown significance.  Followed by hematology.  CBC stable about 6 months ago.  Has an appointment with her hematologist coming up soon.  I encouraged her to get blood work done prior.    Sciatica.  I recommend weaning the gabapentin.  3 mg a day for about 10 days and then maybe every other day and then stop.  If the sciatica is much worse, restart gabapentin let us know.  Otherwise see me in 6 months.

## 2018-06-21 ENCOUNTER — HOSPITAL ENCOUNTER (OUTPATIENT)
Dept: PHYSICAL THERAPY | Facility: HOSPITAL | Age: 83
Setting detail: THERAPIES SERIES
Discharge: HOME | End: 2018-06-21

## 2018-06-21 DIAGNOSIS — M25.512 BILATERAL SHOULDER PAIN, UNSPECIFIED CHRONICITY: ICD-10-CM

## 2018-06-21 DIAGNOSIS — Z98.1 HISTORY OF LUMBAR FUSION: ICD-10-CM

## 2018-06-21 DIAGNOSIS — M51.36 DDD (DEGENERATIVE DISC DISEASE), LUMBAR: ICD-10-CM

## 2018-06-21 DIAGNOSIS — M54.50 CHRONIC BILATERAL LOW BACK PAIN WITHOUT SCIATICA: Primary | ICD-10-CM

## 2018-06-21 DIAGNOSIS — M19.012 OSTEOARTHRITIS OF LEFT SHOULDER, UNSPECIFIED OSTEOARTHRITIS TYPE: ICD-10-CM

## 2018-06-21 DIAGNOSIS — M25.511 BILATERAL SHOULDER PAIN, UNSPECIFIED CHRONICITY: ICD-10-CM

## 2018-06-21 DIAGNOSIS — G89.29 CHRONIC BILATERAL LOW BACK PAIN WITHOUT SCIATICA: Primary | ICD-10-CM

## 2018-06-21 DIAGNOSIS — M25.551 RIGHT HIP PAIN: ICD-10-CM

## 2018-06-21 PROCEDURE — G8979 MOBILITY GOAL STATUS: HCPCS | Performed by: PHYSICAL THERAPIST

## 2018-06-21 PROCEDURE — 97113 AQUATIC THERAPY/EXERCISES: CPT | Performed by: PHYSICAL THERAPIST

## 2018-06-21 PROCEDURE — G8980 MOBILITY D/C STATUS: HCPCS | Performed by: PHYSICAL THERAPIST

## 2018-06-21 NOTE — THERAPY DISCHARGE NOTE
Outpatient Physical Therapy Ortho Treatment Note/Discharge Summary  [facility]     Patient Name: Brittany Villeda  : 1935  MRN: [id number]  Today's Date: 2018      Visit Date: 2018    Visit Dx:    ICD-10-CM ICD-9-CM   1. Chronic bilateral low back pain without sciatica M54.5 724.2    G89.29 338.29   2. History of lumbar fusion Z98.1 V45.4   3. Bilateral shoulder pain, unspecified chronicity M25.511 719.41    M25.512    4. Osteoarthritis of left shoulder, unspecified osteoarthritis type M19.012 715.91   5. DDD (degenerative disc disease), lumbar M51.36 722.52   6. Right hip pain M25.551 719.45       Patient Active Problem List   Diagnosis   • Sciatica   • Non-toxic multinodular goiter   • Chronic midline low back pain without sciatica   • Essential hypertension   • Gastroesophageal reflux disease without esophagitis   • Cataract   • Pulmonary hypertension   • Paroxysmal atrial fibrillation   • Diastolic dysfunction   • HUGO (obstructive sleep apnea)   • Trifascicular block   • Leukopenia   • MGUS (monoclonal gammopathy of unknown significance)   • Ulcerative rectosigmoiditis without complication   • Lichen sclerosus        Past Medical History:   Diagnosis Date   • Anemia    • Arthritis    • Asthma    • Chest pain    • Colitis    • COPD (chronic obstructive pulmonary disease)    • Diastolic dysfunction    • Essential hypertension 2016   • GERD (gastroesophageal reflux disease)    • Hypertension    • Hypertensive heart disease    • Hyperthyroidism    • Kidney stone    • Low back pain    • MGUS (monoclonal gammopathy of unknown significance)    • Nephrolithiasis    • Obesity    • Paroxysmal atrial fibrillation    • Peptic ulceration    • Pulmonary hypertension    • Sleep apnea    • Ventricular tachycardia     nonsustained   • Vertigo         Past Surgical History:   Procedure Laterality Date   • BACK SURGERY      lumbar fusion   • CHOLECYSTECTOMY     • COLONOSCOPY  2014     colitis, cryptitis,  tics, NBIH, TA w/low grade dysplasia   • HEMORRHOIDECTOMY     • HYSTERECTOMY     • KNEE ARTHROPLASTY     • MYOMECTOMY     • SHOULDER SURGERY     • SINUS SURGERY     • TONSILLECTOMY                                       Exercises     Row Name 06/21/18 1400             Subjective Comments    Subjective Comments I can’t afford the [facility] to the [facility] so I haven’t done any water exercise since I was here last.  -HEMALATHA         Subjective Pain    Able to rate subjective pain? yes  -HEMALATHA      Pre-Treatment Pain Level 1  -HEMALATHA      Post-Treatment Pain Level 1  -HEMALATHA      Subjective Pain Comment My back and R shoulder are  doing well. I have a little pain in my R hip.   -HEMALATHA         Aquatics    Aquatics performed? Yes  -HEMALATHA         Aquatics UE    Water Walk forward;side;backward   Independent  -HEMALATHA      Stretch 2 Shld horiz Abd/Add X 10  -HEMALATHA      Stretch Other 1 AROM R shld Flexion (elbow flexed) X 10  -HEMALATHA      Stretch Other 2 Elbow Flexion/ extension 15X holding 2 paddle L5  -HEMALATHA      Scap Retraction/Row Rows L3 paddle X 15  -HEMALATHA      External Rotation @ 0 L3 paddle X 15  -HEMALATHA      Abdominals LN  X 15  -HEMALATHA      Hip Abd/Add 15X ea  -HEMALATHA      Hip Flex/Ext 15X  -HEMALATHA      March in Place Leg Press lg foam ring X 15 ea  -HEMALATHA      Mini Squat Sit/Stand X 10  -HEMALATHA      Toe/Heel Raises 15/15  -HEMALATHA      Uni-Clock Seated bicyles-independent  -HEMALATHA        User Key  (r) = Recorded By, (t) = Taken By, (c) = Cosigned By    Initials Name Provider Type    HEMALATHA Yin, PT Physical Therapist                               PT OP Goals     Row Name 06/21/18 1400          PT Short Term Goals    STG Date to Achieve 05/10/18  -HEMALATHA     STG 1 Patient will perform 45 min session of aquatic exercise without increased symptoms.  -HEMALATHA     STG 1 Progress Met  -HEMALATHA     STG 2 Pt will report reduction of shoulder & back pain from 9/10 to 6/10 or better following aquatic therapy sessions.  -HEMALATHA     STG 2 Progress Met  -HEMALATHA     STG 3 Patient will  demonstrate good core stabilization with ambulation & core exercises in the pool.  -HEMALATHA     STG 3 Progress Met  -HEMALATHA        Long Term Goals    LTG Date to Achieve 06/07/18  -HEMALATHA     LTG 1 Patient will be demonstrate independence with an advanced aquatic program and community resources to facilitate self-management of symptoms  -HEMALATHA     LTG 1 Progress Met  -HEMALATHA     LTG 2 Patient will increase core and lower extremity strength and balance by improvement in the 5 Times Sit to Stand Test from 48 seconds to 40 seconds or less with moderate use of UEs to decrease strain on bilateral shoulders.  -HEMALATHA     LTG 2 Progress Met  -HEMALATHA     LTG 3 Patient will improve score on Modified Oswestry Outcome Measures from 62 % to 50% or less as indication of reduced functional disability  -HEMALATHA     LTG 3 Progress Met  -HEMALATHA     LTG 4 Patient will improve score on SPADI from 68% to 58% or less to indicate improve perceived disability of shoulders.  -HEMALATHA     LTG 4 Progress Met  -HEMALATHA     LTG 4 Progress Comments 37%  -HEMALATHA     LTG 5 Pt will demonstrate improvement in shoulder AROM & strength to allow reaching to at least 90 degrees flexion & to top/posterior head with improved ease & without significantly increasing symptoms for improved tolerance to reaching to cabinets, dressing & washing hair during ADLs   -HEMALATHA     LTG 5 Progress Met  -HEMALATHA       User Key  (r) = Recorded By, (t) = Taken By, (c) = Cosigned By    Initials Name Provider Type    HEMALATHA Yin, PT Physical Therapist                         Time Calculation:   Start Time: 1345  Stop Time: 1428  Time Calculation (min): 43 min  Total Timed Code Minutes- PT: 43 minute(s)  Therapy Suggested Charges     Code   Minutes Charges    None           Therapy Charges for Today     Code Description Service Date Service Provider Modifiers Qty    66580384425 HC PT AQUATIC THERAPY EA 15 MIN 6/21/2018 Dima Yin, PT GP 3    17250235611 HC PT MOBILITY PROJECTED 6/21/2018 Dima Yin, PT GP, CK 1     [id number]  PT MOBILITY DISCHARGE 6/21/2018 Dima Yin, PT GP, CK 1          PT G-Codes  PT Professional Judgement Used?: Yes  Score: WILLIAM 42%, SPADI 37%  Functional Limitation: Mobility: Walking and moving around  Mobility: Walking and Moving Around Goal Status (): At least 40 percent but less than 60 percent impaired, limited or restricted  Mobility: Walking and Moving Around Discharge Status (): At least 40 percent but less than 60 percent impaired, limited or restricted     OP PT Discharge Summary  Date of Discharge: 06/21/18  Reason for Discharge: All goals achieved, Independent  Outcomes Achieved: Able to achieve all goals within established timeline  Discharge Destination: Home with home program  Discharge Instructions/Additional Comments: Brittany reports improvement in back pain- rated 0/10 today, stating new back brace helps.  R shoulder also not as painful and improved AROM-can reach to top and back of head and > 90 degrees. Reports slight pain R hip rated 1/10.  [facility] to [facility] the [facility] due to finances, [facility] as patient must have warm water (did not tolerate temperature at [facility]).        Dima Yin, PT  6/21/2018

## 2018-06-26 RX ORDER — WARFARIN SODIUM 5 MG/1
5 TABLET ORAL
Qty: 90 TABLET | Refills: 1 | Status: SHIPPED | OUTPATIENT
Start: 2018-06-26 | End: 2018-11-13 | Stop reason: SDUPTHER

## 2018-06-26 RX ORDER — FUROSEMIDE 20 MG/1
40 TABLET ORAL EVERY MORNING
Qty: 180 TABLET | Refills: 1 | Status: SHIPPED | OUTPATIENT
Start: 2018-06-26 | End: 2019-05-28 | Stop reason: SDUPTHER

## 2018-06-28 ENCOUNTER — HOSPITAL ENCOUNTER (OUTPATIENT)
Dept: CARDIOLOGY | Facility: HOSPITAL | Age: 83
Setting detail: RECURRING SERIES
Discharge: HOME | End: 2018-06-28

## 2018-06-28 PROCEDURE — 85610 PROTHROMBIN TIME: CPT

## 2018-06-28 PROCEDURE — 36416 COLLJ CAPILLARY BLOOD SPEC: CPT

## 2018-07-09 ENCOUNTER — LAB (OUTPATIENT)
Dept: LAB | Facility: HOSPITAL | Age: 83
End: 2018-07-09

## 2018-07-09 DIAGNOSIS — R79.9 ABNORMAL FINDING OF BLOOD CHEMISTRY: ICD-10-CM

## 2018-07-09 DIAGNOSIS — D47.2 MGUS (MONOCLONAL GAMMOPATHY OF UNKNOWN SIGNIFICANCE): ICD-10-CM

## 2018-07-09 DIAGNOSIS — D70.8 OTHER NEUTROPENIA (HCC): ICD-10-CM

## 2018-07-09 LAB
ALBUMIN SERPL-MCNC: 3.7 G/DL (ref 3.5–5.2)
ALBUMIN/GLOB SERPL: 1 G/DL (ref 1.1–2.4)
ALP SERPL-CCNC: 65 U/L (ref 38–116)
ALT SERPL W P-5'-P-CCNC: 6 U/L (ref 0–33)
ANION GAP SERPL CALCULATED.3IONS-SCNC: 9.7 MMOL/L
AST SERPL-CCNC: 16 U/L (ref 0–32)
BASOPHILS # BLD AUTO: 0.02 10*3/MM3 (ref 0–0.1)
BASOPHILS NFR BLD AUTO: 0.6 % (ref 0–1.1)
BILIRUB SERPL-MCNC: 1.5 MG/DL (ref 0.1–1.2)
BUN BLD-MCNC: 25 MG/DL (ref 6–20)
BUN/CREAT SERPL: 25.8 (ref 7.3–30)
CALCIUM SPEC-SCNC: 10.4 MG/DL (ref 8.5–10.2)
CHLORIDE SERPL-SCNC: 105 MMOL/L (ref 98–107)
CO2 SERPL-SCNC: 27.3 MMOL/L (ref 22–29)
CREAT BLD-MCNC: 0.97 MG/DL (ref 0.6–1.1)
DEPRECATED RDW RBC AUTO: 52.1 FL (ref 37–49)
EOSINOPHIL # BLD AUTO: 0 10*3/MM3 (ref 0–0.36)
EOSINOPHIL NFR BLD AUTO: 0 % (ref 1–5)
ERYTHROCYTE [DISTWIDTH] IN BLOOD BY AUTOMATED COUNT: 14.1 % (ref 11.7–14.5)
FERRITIN SERPL-MCNC: 90.1 NG/ML
GFR SERPL CREATININE-BSD FRML MDRD: 66 ML/MIN/1.73
GLOBULIN UR ELPH-MCNC: 3.7 GM/DL (ref 1.8–3.5)
GLUCOSE BLD-MCNC: 94 MG/DL (ref 74–124)
HCT VFR BLD AUTO: 36.8 % (ref 34–45)
HGB BLD-MCNC: 11.8 G/DL (ref 11.5–14.9)
IMM GRANULOCYTES # BLD: 0 10*3/MM3 (ref 0–0.03)
IMM GRANULOCYTES NFR BLD: 0 % (ref 0–0.5)
IRON 24H UR-MRATE: 93 MCG/DL (ref 37–145)
IRON SATN MFR SERPL: 31 % (ref 14–48)
LYMPHOCYTES # BLD AUTO: 1.64 10*3/MM3 (ref 1–3.5)
LYMPHOCYTES NFR BLD AUTO: 51.4 % (ref 20–49)
MCH RBC QN AUTO: 32.2 PG (ref 27–33)
MCHC RBC AUTO-ENTMCNC: 32.1 G/DL (ref 32–35)
MCV RBC AUTO: 100.3 FL (ref 83–97)
MONOCYTES # BLD AUTO: 0.32 10*3/MM3 (ref 0.25–0.8)
MONOCYTES NFR BLD AUTO: 10 % (ref 4–12)
NEUTROPHILS # BLD AUTO: 1.21 10*3/MM3 (ref 1.5–7)
NEUTROPHILS NFR BLD AUTO: 38 % (ref 39–75)
NRBC BLD MANUAL-RTO: 0 /100 WBC (ref 0–0)
PLATELET # BLD AUTO: 147 10*3/MM3 (ref 150–375)
PMV BLD AUTO: 8.6 FL (ref 8.9–12.1)
POTASSIUM BLD-SCNC: 4.3 MMOL/L (ref 3.5–4.7)
PROT SERPL-MCNC: 7.4 G/DL (ref 6.3–8)
RBC # BLD AUTO: 3.67 10*6/MM3 (ref 3.9–5)
SODIUM BLD-SCNC: 142 MMOL/L (ref 134–145)
TIBC SERPL-MCNC: 298 MCG/DL (ref 249–505)
TRANSFERRIN SERPL-MCNC: 213 MG/DL (ref 200–360)
WBC NRBC COR # BLD: 3.19 10*3/MM3 (ref 4–10)

## 2018-07-09 PROCEDURE — 80053 COMPREHEN METABOLIC PANEL: CPT | Performed by: INTERNAL MEDICINE

## 2018-07-09 PROCEDURE — 36415 COLL VENOUS BLD VENIPUNCTURE: CPT | Performed by: INTERNAL MEDICINE

## 2018-07-09 PROCEDURE — 83540 ASSAY OF IRON: CPT | Performed by: INTERNAL MEDICINE

## 2018-07-09 PROCEDURE — 84466 ASSAY OF TRANSFERRIN: CPT | Performed by: INTERNAL MEDICINE

## 2018-07-09 PROCEDURE — 82728 ASSAY OF FERRITIN: CPT | Performed by: INTERNAL MEDICINE

## 2018-07-09 PROCEDURE — 85025 COMPLETE CBC W/AUTO DIFF WBC: CPT | Performed by: INTERNAL MEDICINE

## 2018-07-10 LAB
ALBUMIN SERPL-MCNC: 3.5 G/DL (ref 2.9–4.4)
ALBUMIN/GLOB SERPL: 1 {RATIO} (ref 0.7–1.7)
ALPHA1 GLOB FLD ELPH-MCNC: 0.3 G/DL (ref 0–0.4)
ALPHA2 GLOB SERPL ELPH-MCNC: 0.6 G/DL (ref 0.4–1)
B-GLOBULIN SERPL ELPH-MCNC: 1.3 G/DL (ref 0.7–1.3)
GAMMA GLOB SERPL ELPH-MCNC: 1.4 G/DL (ref 0.4–1.8)
GLOBULIN SER CALC-MCNC: 3.6 G/DL (ref 2.2–3.9)
IGA SERPL-MCNC: 723 MG/DL (ref 64–422)
IGG SERPL-MCNC: 1359 MG/DL (ref 700–1600)
IGM SERPL-MCNC: 62 MG/DL (ref 26–217)
INTERPRETATION SERPL IEP-IMP: ABNORMAL
KAPPA LC SERPL-MCNC: 45.7 MG/L (ref 3.3–19.4)
KAPPA LC/LAMBDA SER: 0.88 {RATIO} (ref 0.26–1.65)
LAMBDA LC FREE SERPL-MCNC: 51.9 MG/L (ref 5.7–26.3)
Lab: ABNORMAL
M-SPIKE: 0.4 G/DL
PROT SERPL-MCNC: 7.1 G/DL (ref 6–8.5)

## 2018-07-16 ENCOUNTER — APPOINTMENT (OUTPATIENT)
Dept: LAB | Facility: HOSPITAL | Age: 83
End: 2018-07-16

## 2018-07-16 ENCOUNTER — OFFICE VISIT (OUTPATIENT)
Dept: ONCOLOGY | Facility: CLINIC | Age: 83
End: 2018-07-16

## 2018-07-16 VITALS
DIASTOLIC BLOOD PRESSURE: 82 MMHG | SYSTOLIC BLOOD PRESSURE: 132 MMHG | HEIGHT: 64 IN | OXYGEN SATURATION: 96 % | TEMPERATURE: 97.9 F | HEART RATE: 54 BPM | BODY MASS INDEX: 41.48 KG/M2 | WEIGHT: 243 LBS | RESPIRATION RATE: 16 BRPM

## 2018-07-16 DIAGNOSIS — D70.8 OTHER NEUTROPENIA (HCC): ICD-10-CM

## 2018-07-16 DIAGNOSIS — D47.2 MGUS (MONOCLONAL GAMMOPATHY OF UNKNOWN SIGNIFICANCE): ICD-10-CM

## 2018-07-16 DIAGNOSIS — K51.30 ULCERATIVE RECTOSIGMOIDITIS WITHOUT COMPLICATION (HCC): Primary | ICD-10-CM

## 2018-07-16 PROCEDURE — G0463 HOSPITAL OUTPT CLINIC VISIT: HCPCS | Performed by: INTERNAL MEDICINE

## 2018-07-16 PROCEDURE — 99214 OFFICE O/P EST MOD 30 MIN: CPT | Performed by: INTERNAL MEDICINE

## 2018-07-16 RX ORDER — MOMETASONE FUROATE 1 MG/G
CREAM TOPICAL
COMMUNITY
Start: 2018-06-14 | End: 2018-09-17 | Stop reason: SDUPTHER

## 2018-07-26 ENCOUNTER — HOSPITAL ENCOUNTER (OUTPATIENT)
Dept: CARDIOLOGY | Facility: HOSPITAL | Age: 83
Setting detail: RECURRING SERIES
Discharge: HOME | End: 2018-07-26

## 2018-07-26 PROCEDURE — 36416 COLLJ CAPILLARY BLOOD SPEC: CPT

## 2018-07-26 PROCEDURE — 85610 PROTHROMBIN TIME: CPT

## 2018-08-27 ENCOUNTER — HOSPITAL ENCOUNTER (OUTPATIENT)
Dept: CARDIOLOGY | Facility: HOSPITAL | Age: 83
Setting detail: RECURRING SERIES
Discharge: HOME | End: 2018-08-27

## 2018-08-27 PROCEDURE — 85610 PROTHROMBIN TIME: CPT

## 2018-08-27 PROCEDURE — 36416 COLLJ CAPILLARY BLOOD SPEC: CPT

## 2018-09-11 RX ORDER — OMEPRAZOLE 20 MG/1
CAPSULE, DELAYED RELEASE ORAL
Qty: 90 CAPSULE | Refills: 0 | Status: SHIPPED | OUTPATIENT
Start: 2018-09-11 | End: 2019-02-21 | Stop reason: SDUPTHER

## 2018-09-12 ENCOUNTER — TELEPHONE (OUTPATIENT)
Dept: GASTROENTEROLOGY | Facility: CLINIC | Age: 83
End: 2018-09-12

## 2018-09-12 RX ORDER — MESALAMINE 1.2 G/1
4800 TABLET, DELAYED RELEASE ORAL DAILY
Qty: 360 TABLET | Refills: 0 | Status: SHIPPED | OUTPATIENT
Start: 2018-09-12 | End: 2018-11-21 | Stop reason: SDUPTHER

## 2018-09-12 NOTE — TELEPHONE ENCOUNTER
Faxed request received from [facility] for Yazmin DOMÍNGUEZ 1.2 gm - take 4 tabs by mouth daily, #360.  Pt has f/u appt on 11/5/18.     Escribe completed as requested, R0.

## 2018-09-17 ENCOUNTER — OFFICE VISIT (OUTPATIENT)
Dept: CARDIOLOGY | Facility: CLINIC | Age: 83
End: 2018-09-17

## 2018-09-17 VITALS
BODY MASS INDEX: 35.68 KG/M2 | HEART RATE: 55 BPM | HEIGHT: 68 IN | DIASTOLIC BLOOD PRESSURE: 80 MMHG | SYSTOLIC BLOOD PRESSURE: 130 MMHG | WEIGHT: 235.4 LBS

## 2018-09-17 DIAGNOSIS — R00.1 BRADYCARDIA: Primary | ICD-10-CM

## 2018-09-17 DIAGNOSIS — I27.20 PULMONARY HYPERTENSION (HCC): ICD-10-CM

## 2018-09-17 PROCEDURE — 99214 OFFICE O/P EST MOD 30 MIN: CPT | Performed by: INTERNAL MEDICINE

## 2018-09-17 PROCEDURE — 93000 ELECTROCARDIOGRAM COMPLETE: CPT | Performed by: INTERNAL MEDICINE

## 2018-09-17 NOTE — PROGRESS NOTES
Date of Office Visit: 2018  Encounter Provider: Mary Grace Culp MD  Place of Service: [facility] CARDIOLOGY  Patient Name: Brittany Villeda  :1935    Chief complaint  follow-up of paroxysmal atrial fibrillation, hypertension with hypertensive heart disease conduction disease and chronic warfarin therapy     History of Present Illness  The patient is a pleasant, 83-year-old female with a history of hypertension, obstructive sleep apnea, obesity, immobility, pulmonary hypertension, history of nonsustained ventricular tachycardia, and obstructive sleep apnea.  She has a history of diastolic dysfunction.  In , she had nonsustained ventricular tachycardia.  A stress perfusion study was negative for ischemia.  An echocardiogram revealed normal left ventricular size and function with moderate left ventricular hypertrophy.  In 2012, she had chronic obstructive pulmonary disease exacerbation as well as chest pain that resolved with treatment of her chronic obstructive pulmonary disease.  In 2013, she was seen for persistent dizziness in the setting of new-onset paroxysmal atrial fibrillation.  She was admitted to the hospital and not felt to have had a stroke.  Her symptoms actually resolved with ear manipulation and were felt to be more vestibular in nature.  She also developed paroxysmal atrial fibrillation with early bradycardia which resolved with treatment of her sleep apnea and AV flaco blocker therapy.  She was placed on warfarin.  She also had a heme-positive stool with anemia and was seen by the gastrointestinal doctors and EGD and colonoscopy were performed. The EGD revealed mild erythema but otherwise stable.  She initiated therapy with a BiPAP.  In 2017 presented to the emergency room with chest pain.  She ruled out for myocardial infarction.  A stress perfusion study that was negative for ischemia with normal systolic function.  A Ziopatch revealed  sinus rhythm with episodes of supra-ventricular tachycardia that was symptomatic.    Since last visit she is ambulating with her cane unfortunately she is not tolerating CPAP at all.  She denies any chest pain, shortness of breath, palpitations, syncope near syncope.  Her heart rate had home has been in the 60s    Past Medical History:   Diagnosis Date   • Anemia    • Arthritis    • Asthma    • Chest pain    • Colitis    • COPD (chronic obstructive pulmonary disease) (CMS/HCC)    • Diastolic dysfunction    • Essential hypertension 5/12/2016   • GERD (gastroesophageal reflux disease)    • Hypertension    • Hypertensive heart disease    • Hyperthyroidism    • Kidney stone    • Low back pain    • MGUS (monoclonal gammopathy of unknown significance)    • Nephrolithiasis    • Obesity    • Paroxysmal atrial fibrillation (CMS/HCC)    • Peptic ulceration    • Pulmonary hypertension    • Sleep apnea    • Ventricular tachycardia (CMS/HCC)     nonsustained   • Vertigo      Past Surgical History:   Procedure Laterality Date   • BACK SURGERY      lumbar fusion   • CHOLECYSTECTOMY     • COLONOSCOPY  06/16/2014    colitis, cryptitis,  tics, NBIH, TA w/low grade dysplasia   • HEMORRHOIDECTOMY     • HYSTERECTOMY     • KNEE ARTHROPLASTY     • MYOMECTOMY     • SHOULDER SURGERY     • SINUS SURGERY     • TONSILLECTOMY       Outpatient Medications Prior to Visit   Medication Sig Dispense Refill   • Acetaminophen (PAIN RELIEVER PO) Take  by mouth As Needed.     • calcium carbonate (OS-ALIDA) 600 MG tablet Take 600 mg by mouth Daily.     • ferrous sulfate 325 (65 FE) MG tablet Take 1 tablet by mouth Daily With Breakfast. (Patient taking differently: Take 325 mg by mouth. Take 1 po every other week)     • furosemide (LASIX) 20 MG tablet Take 2 tablets by mouth Every Morning. 20mg in the  tablet 1   • gabapentin (NEURONTIN) 300 MG capsule Take 1 capsule by mouth 2 (Two) Times a Day. 180 capsule 1   • mesalamine (LIALDA) 1.2 g EC tablet  Take 4 tablets by mouth Daily. 360 tablet 0   • mometasone (ELOCON) 0.1 % ointment Apply 1 application topically to the appropriate area as directed 2 (Two) Times a Day. As needed     • omeprazole (priLOSEC) 20 MG capsule TAKE 1 CAPSULE EVERY DAY 90 capsule 0   • vitamin B-12 (CYANOCOBALAMIN) 1000 MCG tablet Take 1,000 mcg by mouth Daily.     • warfarin (COUMADIN) 5 MG tablet Take 1 tablet by mouth Daily. 90 tablet 1   • mometasone (ELOCON) 0.1 % cream        No facility-administered medications prior to visit.        Allergies as of 09/17/2018 - Reviewed 09/17/2018   Allergen Reaction Noted   • Amitriptyline  05/12/2016   • Amoxicillin-pot clavulanate  05/12/2016   • Aspirin  05/12/2016   • Bactrim [sulfamethoxazole-trimethoprim]  05/12/2016   • Codeine  11/30/2017   • Iodinated diagnostic agents  05/12/2016   • Latex  09/12/2016   • Naproxen  05/12/2016   • Nsaids  05/12/2016   • Soma compound with codeine [carisoprodol-aspirin-codeine]  09/12/2016   • Sulfa antibiotics  05/12/2016     Social History     Social History   • Marital status:      Spouse name: N/A   • Number of children: 10   • Years of education: High School     Occupational History   • Caregiver Retired     worked for [employer]     Social History Main Topics   • Smoking status: Former Smoker     Packs/day: 1.50     Years: 10.00   • Smokeless tobacco: Never Used   • Alcohol use No      Comment: Daily caffeine use   • Drug use: No   • Sexual activity: No     Other Topics Concern   • Not on file     Social History Narrative   • No narrative on file     Family History   Problem Relation Age of Onset   • Diabetes Mother    • Breast cancer Sister    • Kidney cancer Sister    • Heart disease Sister    • Prostate cancer Brother    • Prostate cancer Brother    • Prostate cancer Brother      Review of Systems   Constitution: Negative for fever, malaise/fatigue, weight gain and weight loss.   HENT: Negative for ear pain, hearing loss,  "nosebleeds and sore throat.    Eyes: Negative for double vision, pain, vision loss in left eye and vision loss in right eye.   Cardiovascular:        See history of present illness.   Respiratory: Negative for cough, shortness of breath, sleep disturbances due to breathing, snoring and wheezing.    Endocrine: Negative for cold intolerance, heat intolerance and polyuria.   Skin: Negative for itching, poor wound healing and rash.   Musculoskeletal: Negative for joint pain, joint swelling and myalgias.   Gastrointestinal: Negative for abdominal pain, diarrhea, hematochezia, nausea and vomiting.   Genitourinary: Negative for hematuria and hesitancy.   Neurological: Negative for numbness, paresthesias and seizures.   Psychiatric/Behavioral: Negative for depression. The patient is not nervous/anxious.         Objective:     Vitals:    09/17/18 1005   BP: 130/80   Pulse: 55   Weight: 107 kg (235 lb 6.4 oz)   Height: 172.7 cm (68\")     Body mass index is 35.79 kg/m².    Physical Exam   Constitutional: She is oriented to person, place, and time. She appears well-developed and well-nourished.   Obese   HENT:   Head: Normocephalic.   Nose: Nose normal.   Mouth/Throat: Oropharynx is clear and moist.   Eyes: Pupils are equal, round, and reactive to light. Conjunctivae and EOM are normal. Right eye exhibits no discharge. No scleral icterus.   Neck: Normal range of motion. Neck supple. No JVD present. No thyromegaly present.   Cardiovascular: Normal rate, regular rhythm, normal heart sounds and intact distal pulses.  Exam reveals no gallop and no friction rub.    No murmur heard.  Pulses:       Carotid pulses are 2+ on the right side, and 2+ on the left side.       Radial pulses are 2+ on the right side, and 2+ on the left side.        Femoral pulses are 0 on the right side, and 0 on the left side.       Popliteal pulses are 0 on the right side, and 0 on the left side.        Dorsalis pedis pulses are 2+ on the right side, and 2+ " on the left side.        Posterior tibial pulses are 2+ on the right side, and 2+ on the left side.   Pulmonary/Chest: Effort normal and breath sounds normal. No respiratory distress. She has no wheezes. She has no rales.   Abdominal: Soft. Bowel sounds are normal. She exhibits no distension. There is no hepatosplenomegaly. There is no tenderness. There is no rebound.   Musculoskeletal: Normal range of motion. She exhibits no edema or tenderness.   Neurological: She is alert and oriented to person, place, and time.   Skin: Skin is warm and dry. No rash noted. No erythema.   Psychiatric: She has a normal mood and affect. Her behavior is normal. Judgment and thought content normal.   Vitals reviewed.    Lab Review:     ECG 12 Lead  Date/Time: 9/17/2018 7:45 PM  Performed by: DAMIAN PAZ  Authorized by: DAMIAN PAZ   Rhythm: sinus bradycardia  Rhythm comments: with sinus arrhythmia  Conduction: right bundle branch block  Clinical impression: abnormal ECG          Assessment:       Diagnosis Plan   1. Bradycardia  Holter Monitor - 24 Hour   2. Pulmonary hypertension  Adult Transthoracic Echo Complete W/ Cont if Necessary Per Protocol     Plan:       1.  Distant history of paroxysmal atrial fibrillation.  With an elevated chads VASC score she's been maintained on warfarin  2.  Supraventricular tachycardia. As above  3.  Hypertension, under good control.  4.  History of diastolic dysfunction.   5.  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 With brief pauses on today's EKG, will place a 24 hour holter  6.  Pulmonary hypertension.  Last echocardiogram in 2014 showed an RV systolic pressure of 41 mmHg. Will check an echo.   7   Chest pain.  Negative PET scan in December 2017  8.  Untreated HUGO, intolerant of BiPAP    Atrial Fibrillation and Atrial Flutter  Assessment  • The patient has paroxysmal atrial fibrillation  • This is non-valvular in etiology  • The patient's CHADS2-VASc score is 4  • A  [id number] score of 2 or more is considered a high risk for a thromboembolic event  • Warfarin prescribed    Plan  • Attempt to maintain sinus rhythm  • Continue warfarin for antithrombotic therapy, bleeding issues discussed         Your medication list          Accurate as of 9/17/18 11:59 PM. If you have any questions, ask your nurse or doctor.               CHANGE how you take these medications      Instructions Last Dose Given Next Dose Due   ferrous sulfate 325 (65 FE) MG tablet  What changed:  · when to take this  · additional instructions      Take 1 tablet by mouth Daily With Breakfast.       mometasone 0.1 % ointment  Commonly known as:  ELOCON  What changed:  Another medication with the same name was removed. Continue taking this medication, and follow the directions you see here.  Changed by:  Mary Grace Culp MD      Apply 1 application topically to the appropriate area as directed 2 (Two) Times a Day. As needed          CONTINUE taking these medications      Instructions Last Dose Given Next Dose Due   calcium carbonate 600 MG tablet  Commonly known as:  OS-ALIDA      Take 600 mg by mouth Daily.       furosemide 20 MG tablet  Commonly known as:  LASIX      Take 2 tablets by mouth Every Morning. 20mg in the PM       gabapentin 300 MG capsule  Commonly known as:  NEURONTIN      Take 1 capsule by mouth 2 (Two) Times a Day.       mesalamine 1.2 g EC tablet  Commonly known as:  LIALDA      Take 4 tablets by mouth Daily.       omeprazole 20 MG capsule  Commonly known as:  priLOSEC      TAKE 1 CAPSULE EVERY DAY       PAIN RELIEVER PO      Take  by mouth As Needed.       vitamin B-12 1000 MCG tablet  Commonly known as:  CYANOCOBALAMIN      Take 1,000 mcg by mouth Daily.       warfarin 5 MG tablet  Commonly known as:  COUMADIN      Take 1 tablet by mouth Daily.              Dictated utilizing Dragon dictation

## 2018-09-24 ENCOUNTER — APPOINTMENT (OUTPATIENT)
Dept: PHARMACY | Facility: HOSPITAL | Age: 83
End: 2018-09-24

## 2018-09-27 ENCOUNTER — HOSPITAL ENCOUNTER (OUTPATIENT)
Dept: CARDIOLOGY | Facility: HOSPITAL | Age: 83
Discharge: HOME | End: 2018-09-27
Attending: INTERNAL MEDICINE | Admitting: INTERNAL MEDICINE

## 2018-09-27 ENCOUNTER — ANTICOAGULATION VISIT (OUTPATIENT)
Dept: PHARMACY | Facility: HOSPITAL | Age: 83
End: 2018-09-27

## 2018-09-27 VITALS
DIASTOLIC BLOOD PRESSURE: 70 MMHG | WEIGHT: 235 LBS | HEART RATE: 74 BPM | HEIGHT: 68 IN | BODY MASS INDEX: 35.61 KG/M2 | SYSTOLIC BLOOD PRESSURE: 100 MMHG

## 2018-09-27 DIAGNOSIS — I48.0 PAROXYSMAL ATRIAL FIBRILLATION (HCC): ICD-10-CM

## 2018-09-27 DIAGNOSIS — I27.20 PULMONARY HYPERTENSION (HCC): ICD-10-CM

## 2018-09-27 LAB
ASCENDING AORTA: 3.8 CM
BH CV ECHO MEAS - ACS: 2.2 CM
BH CV ECHO MEAS - AO MAX PG (FULL): 3.2 MMHG
BH CV ECHO MEAS - AO MAX PG: 8.1 MMHG
BH CV ECHO MEAS - AO MEAN PG (FULL): 1.9 MMHG
BH CV ECHO MEAS - AO MEAN PG: 4.3 MMHG
BH CV ECHO MEAS - AO ROOT AREA (BSA CORRECTED): 1.6
BH CV ECHO MEAS - AO ROOT AREA: 10 CM^2
BH CV ECHO MEAS - AO ROOT DIAM: 3.6 CM
BH CV ECHO MEAS - AO V2 MAX: 142.4 CM/SEC
BH CV ECHO MEAS - AO V2 MEAN: 97.3 CM/SEC
BH CV ECHO MEAS - AO V2 VTI: 32.9 CM
BH CV ECHO MEAS - AVA(I,A): 2.6 CM^2
BH CV ECHO MEAS - AVA(I,D): 2.6 CM^2
BH CV ECHO MEAS - AVA(V,A): 2.9 CM^2
BH CV ECHO MEAS - AVA(V,D): 2.9 CM^2
BH CV ECHO MEAS - BSA(HAYCOCK): 2.3 M^2
BH CV ECHO MEAS - BSA: 2.2 M^2
BH CV ECHO MEAS - BZI_BMI: 35.7 KILOGRAMS/M^2
BH CV ECHO MEAS - BZI_METRIC_HEIGHT: 172.7 CM
BH CV ECHO MEAS - BZI_METRIC_WEIGHT: 106.6 KG
BH CV ECHO MEAS - EDV(MOD-SP2): 98 ML
BH CV ECHO MEAS - EDV(MOD-SP4): 100 ML
BH CV ECHO MEAS - EDV(TEICH): 93.3 ML
BH CV ECHO MEAS - EF(CUBED): 58.1 %
BH CV ECHO MEAS - EF(MOD-BP): 61 %
BH CV ECHO MEAS - EF(MOD-SP2): 63.3 %
BH CV ECHO MEAS - EF(MOD-SP4): 57 %
BH CV ECHO MEAS - EF(TEICH): 49.9 %
BH CV ECHO MEAS - ESV(MOD-SP2): 36 ML
BH CV ECHO MEAS - ESV(MOD-SP4): 43 ML
BH CV ECHO MEAS - ESV(TEICH): 46.8 ML
BH CV ECHO MEAS - FS: 25.2 %
BH CV ECHO MEAS - IVS/LVPW: 1.2
BH CV ECHO MEAS - IVSD: 1 CM
BH CV ECHO MEAS - LAT PEAK E' VEL: 11 CM/SEC
BH CV ECHO MEAS - LV DIASTOLIC VOL/BSA (35-75): 45.7 ML/M^2
BH CV ECHO MEAS - LV MASS(C)D: 141.2 GRAMS
BH CV ECHO MEAS - LV MASS(C)DI: 64.5 GRAMS/M^2
BH CV ECHO MEAS - LV MAX PG: 4.9 MMHG
BH CV ECHO MEAS - LV MEAN PG: 2.4 MMHG
BH CV ECHO MEAS - LV SYSTOLIC VOL/BSA (12-30): 19.6 ML/M^2
BH CV ECHO MEAS - LV V1 MAX: 110.6 CM/SEC
BH CV ECHO MEAS - LV V1 MEAN: 73.3 CM/SEC
BH CV ECHO MEAS - LV V1 VTI: 23.2 CM
BH CV ECHO MEAS - LVIDD: 4.5 CM
BH CV ECHO MEAS - LVIDS: 3.4 CM
BH CV ECHO MEAS - LVLD AP2: 7 CM
BH CV ECHO MEAS - LVLD AP4: 7.1 CM
BH CV ECHO MEAS - LVLS AP2: 5.7 CM
BH CV ECHO MEAS - LVLS AP4: 6.2 CM
BH CV ECHO MEAS - LVOT AREA (M): 3.8 CM^2
BH CV ECHO MEAS - LVOT AREA: 3.8 CM^2
BH CV ECHO MEAS - LVOT DIAM: 2.2 CM
BH CV ECHO MEAS - LVPWD: 0.86 CM
BH CV ECHO MEAS - MED PEAK E' VEL: 7 CM/SEC
BH CV ECHO MEAS - MR MAX PG: 19.2 MMHG
BH CV ECHO MEAS - MR MAX VEL: 219.2 CM/SEC
BH CV ECHO MEAS - MV A DUR: 0.15 SEC
BH CV ECHO MEAS - MV A MAX VEL: 56.8 CM/SEC
BH CV ECHO MEAS - MV DEC SLOPE: 193.7 CM/SEC^2
BH CV ECHO MEAS - MV DEC TIME: 0.24 SEC
BH CV ECHO MEAS - MV E MAX VEL: 42.2 CM/SEC
BH CV ECHO MEAS - MV E/A: 0.74
BH CV ECHO MEAS - MV MAX PG: 1.4 MMHG
BH CV ECHO MEAS - MV MEAN PG: 0.79 MMHG
BH CV ECHO MEAS - MV P1/2T MAX VEL: 45.1 CM/SEC
BH CV ECHO MEAS - MV P1/2T: 68.2 MSEC
BH CV ECHO MEAS - MV V2 MAX: 59.7 CM/SEC
BH CV ECHO MEAS - MV V2 MEAN: 41.8 CM/SEC
BH CV ECHO MEAS - MV V2 VTI: 18.3 CM
BH CV ECHO MEAS - MVA P1/2T LCG: 4.9 CM^2
BH CV ECHO MEAS - MVA(P1/2T): 3.2 CM^2
BH CV ECHO MEAS - MVA(VTI): 4.8 CM^2
BH CV ECHO MEAS - PA ACC TIME: 0.13 SEC
BH CV ECHO MEAS - PA MAX PG (FULL): 2.6 MMHG
BH CV ECHO MEAS - PA MAX PG: 4.9 MMHG
BH CV ECHO MEAS - PA PR(ACCEL): 22 MMHG
BH CV ECHO MEAS - PA V2 MAX: 110.5 CM/SEC
BH CV ECHO MEAS - PULM A REVS DUR: 0.1 SEC
BH CV ECHO MEAS - PULM A REVS VEL: 28.1 CM/SEC
BH CV ECHO MEAS - PULM DIAS VEL: 41.7 CM/SEC
BH CV ECHO MEAS - PULM S/D: 0.64
BH CV ECHO MEAS - PULM SYS VEL: 26.7 CM/SEC
BH CV ECHO MEAS - PVA(V,A): 1.9 CM^2
BH CV ECHO MEAS - PVA(V,D): 1.9 CM^2
BH CV ECHO MEAS - QP/QS: 0.56
BH CV ECHO MEAS - RAP SYSTOLE: 15 MMHG
BH CV ECHO MEAS - RV MAX PG: 2.3 MMHG
BH CV ECHO MEAS - RV MEAN PG: 1.1 MMHG
BH CV ECHO MEAS - RV V1 MAX: 75.2 CM/SEC
BH CV ECHO MEAS - RV V1 MEAN: 49.6 CM/SEC
BH CV ECHO MEAS - RV V1 VTI: 17.3 CM
BH CV ECHO MEAS - RVOT AREA: 2.8 CM^2
BH CV ECHO MEAS - RVOT DIAM: 1.9 CM
BH CV ECHO MEAS - RVSP: 48 MMHG
BH CV ECHO MEAS - SI(AO): 150.9 ML/M^2
BH CV ECHO MEAS - SI(CUBED): 24.5 ML/M^2
BH CV ECHO MEAS - SI(LVOT): 39.8 ML/M^2
BH CV ECHO MEAS - SI(MOD-SP2): 28.3 ML/M^2
BH CV ECHO MEAS - SI(MOD-SP4): 26 ML/M^2
BH CV ECHO MEAS - SI(TEICH): 21.2 ML/M^2
BH CV ECHO MEAS - SUP REN AO DIAM: 2.1 CM
BH CV ECHO MEAS - SV(AO): 330.4 ML
BH CV ECHO MEAS - SV(CUBED): 53.5 ML
BH CV ECHO MEAS - SV(LVOT): 87.1 ML
BH CV ECHO MEAS - SV(MOD-SP2): 62 ML
BH CV ECHO MEAS - SV(MOD-SP4): 57 ML
BH CV ECHO MEAS - SV(RVOT): 49.2 ML
BH CV ECHO MEAS - SV(TEICH): 46.5 ML
BH CV ECHO MEAS - TAPSE (>1.6): 1.5 CM2
BH CV ECHO MEAS - TR MAX VEL: 288.4 CM/SEC
BH CV ECHO MEASUREMENTS AVERAGE E/E' RATIO: 4.69
BH CV XLRA - RV BASE: 3.2 CM
BH CV XLRA - TDI S': 8 CM/SEC
INR PPP: 2.1 (ref 0.91–1.09)
LEFT ATRIUM VOLUME INDEX: 33 ML/M2
PROTHROMBIN TIME: 25.4 SECONDS (ref 10–13.8)
SINUS: 3.3 CM
STJ: 2.9 CM

## 2018-09-27 PROCEDURE — 85610 PROTHROMBIN TIME: CPT

## 2018-09-27 PROCEDURE — 93306 TTE W/DOPPLER COMPLETE: CPT | Performed by: INTERNAL MEDICINE

## 2018-09-27 PROCEDURE — 36416 COLLJ CAPILLARY BLOOD SPEC: CPT

## 2018-09-27 PROCEDURE — G0463 HOSPITAL OUTPT CLINIC VISIT: HCPCS

## 2018-09-27 PROCEDURE — 93306 TTE W/DOPPLER COMPLETE: CPT

## 2018-09-27 NOTE — PROGRESS NOTES
Anticoagulation Clinic Progress Note  Anticoagulation Summary  As of 2018    INR goal:   2.0-3.0   TTR:   --   Today's INR:   2.1   Warfarin maintenance plan:   2.5 mg on Thu, Sat; 5 mg all other days   Weekly warfarin total:   30 mg   Plan last modified:   Janice Hart [facility] (2018)   Next INR check:   10/24/2018   Target end date:   Indefinite    Indications    Paroxysmal atrial fibrillation (CMS/HCC) [I48.0]             Anticoagulation Episode Summary     INR check location:       Preferred lab:       Send INR reminders to:    [facility]    Comments:         Anticoagulation Care Providers     Provider Role Specialty Phone number    Mary Grace Culp MD Referring Cardiology [phone]    Janice Hart [facility] Responsible Pharmacy             Drug interactions: No new medications  Diet: consistent    Clinic Interview:  Patient Findings     Negatives:   Signs/symptoms of thrombosis, Signs/symptoms of bleeding,   Laboratory test error suspected, Change in health, Change in alcohol use,   Change in activity, Upcoming invasive procedure, Emergency department   visit, Upcoming dental procedure, Missed doses, Extra doses, Change in   medications, Change in diet/appetite, Hospital admission, Bruising, Other   complaints      Clinical Outcomes     Negatives:   Major bleeding event, Thromboembolic event,   Anticoagulation-related hospital admission, Anticoagulation-related ED   visit, Anticoagulation-related fatality        Education:  Brittany Villeda is a new start in the [facility]. We discussed the followin) Warfarin's indication, mechanism, and dosing  2) Enforced the importance of taking warfarin as instructed and at the same time every day, preferably in the evening so that we can make dose adjustments more easily following subsequent clinic visits  3) What she should do about a missed dose; pts can take missed doses within about 12 hours of their usual  scheduled dose, but she was instructed on the importance of not doubling up on doses unless told to do so by the [facility]  4) Explained possible side effects of warfarin therapy, including increased risk of bleeding, s/sx of bleeding and s/sx of any additional clots/PE/CVA.   5) Discussed monitoring of warfarin, the INR, goal INR range, and the frequency of monitoring  6) Reviewed drug/food/tobacco/EtOH interactions and provided written information covering these topics in more detail, explaining that green, leafy vegetables interact most heavily with warfarin  7) Instructed the pt not to take or discontinue any medications without informing her physician/pharmacist and reminded her to inform us of any dietary changes, as well  8) Explained that she would be coming into the clinic more frequently in these first few weeks of therapy as we try to adjust her dose and achieve a therapeutic INR x 2 consecutive readings. Once that is achieved, patient will follow up in clinic every 4 weeks, on average.    She stated no problems with transportation or scheduling clinic appts in this manner. she expressed understanding of the information provided and has no additional questions at this time.    Brittany Villeda was presented with a copy of the Patients Rights and Responsibilities. she expressed verbal consent and agreement to receive care in the [facility] under the current collaborative care agreement with [facility].       INR History:  Previous INR data from [facility] is recorded in [facility] and scanned into the patient's chart in [facility]. These results have been analyzed and reviewed.      Plan:  1. INR is Therapeutic today- see above in Anticoagulation Summary.   Will instruct Brittany Villeda to Continue their warfarin regimen- see above in Anticoagulation Summary.  2. Follow up in 1 month  3. Patient declines warfarin refills.  4. Verbal and  written information provided. Patient expresses understanding and has no further questions at this time.    Janice Hart, [facility]

## 2018-09-28 ENCOUNTER — TELEPHONE (OUTPATIENT)
Dept: CARDIOLOGY | Facility: CLINIC | Age: 83
End: 2018-09-28

## 2018-10-01 ENCOUNTER — TELEPHONE (OUTPATIENT)
Dept: FAMILY MEDICINE CLINIC | Facility: CLINIC | Age: 83
End: 2018-10-01

## 2018-10-01 NOTE — TELEPHONE ENCOUNTER
Pt of Dr Esparza last seen 6/18    She called today because starting Sunday morning going to [place] she could hear a ringing in ears. She came home and it was still happening. She said it sounds like beeping. Will you refer to ENT or does she need to come in here?

## 2018-10-01 NOTE — TELEPHONE ENCOUNTER
Complains of ringing in ears.  If other symptoms should be seen here or UC,or if she has ready access to ENT could go there.  If just ringing and no other problems would give it a few days.

## 2018-10-02 ENCOUNTER — OFFICE VISIT (OUTPATIENT)
Dept: FAMILY MEDICINE CLINIC | Facility: CLINIC | Age: 83
End: 2018-10-02

## 2018-10-02 VITALS
RESPIRATION RATE: 16 BRPM | WEIGHT: 230.7 LBS | BODY MASS INDEX: 34.96 KG/M2 | HEART RATE: 88 BPM | TEMPERATURE: 98.2 F | SYSTOLIC BLOOD PRESSURE: 122 MMHG | OXYGEN SATURATION: 95 % | HEIGHT: 68 IN | DIASTOLIC BLOOD PRESSURE: 82 MMHG

## 2018-10-02 DIAGNOSIS — Z23 NEED FOR IMMUNIZATION AGAINST INFLUENZA: ICD-10-CM

## 2018-10-02 DIAGNOSIS — H93.12 TINNITUS OF LEFT EAR: Primary | ICD-10-CM

## 2018-10-02 PROCEDURE — 99214 OFFICE O/P EST MOD 30 MIN: CPT | Performed by: FAMILY MEDICINE

## 2018-10-02 PROCEDURE — 90662 IIV NO PRSV INCREASED AG IM: CPT | Performed by: FAMILY MEDICINE

## 2018-10-02 PROCEDURE — G0008 ADMIN INFLUENZA VIRUS VAC: HCPCS | Performed by: FAMILY MEDICINE

## 2018-10-02 NOTE — PATIENT INSTRUCTIONS
ER if neurological symptoms--speech, visual changes, etc    If you do not hear about the ear doctor appointment this week, call us

## 2018-10-02 NOTE — PROGRESS NOTES
Subjective   Brittany Villeda is a 83 y.o. female.     Complains of a noise in her left ear.  While driving to [place] Sunday, September 30 she noticed an intermittent noise in her left ear that sounded like the alarm in a car when her seatbelt isn't fastened.  Actually got fairly loud.  Continues intermittently.  She has no pain injury or illness.  She says that since this happened she has had a little bit of the blowing noise in the right ear.  She does not appreciate any acute hearing loss.  She has had no diplopia, dysphagia or other neurological symptoms.  The noise is intermittent.  Years ago she had some vertigo but she has had no dizziness associated with this.  He is on warfarin for PAF         The following portions of the patient's history were reviewed and updated as appropriate: allergies, current medications, past family history, past medical history, past social history, past surgical history and problem list.    Review of Systems   Constitutional: Negative for chills and fever.   HENT: Positive for tinnitus. Negative for congestion, rhinorrhea and sore throat.    Eyes: Negative for discharge and visual disturbance.   Respiratory: Negative for cough and shortness of breath.    Cardiovascular: Negative for chest pain.   Gastrointestinal: Negative for abdominal pain, constipation, diarrhea, nausea and vomiting.   Endocrine: Negative for polydipsia.   Genitourinary: Negative for dysuria and hematuria.   Musculoskeletal: Negative for arthralgias and myalgias.   Skin: Negative for rash.   Allergic/Immunologic: Negative.    Neurological: Negative for dizziness, speech difficulty, weakness, numbness and headaches.   Hematological: Does not bruise/bleed easily.   Psychiatric/Behavioral: Negative for dysphoric mood. The patient is not nervous/anxious.    All other systems reviewed and are negative.      Objective   Physical Exam   Constitutional: She is oriented to person, place, and time. She appears  well-developed and well-nourished.   HENT:   Head: Normocephalic and atraumatic.   Right Ear: External ear normal.   Left Ear: External ear normal.   Mouth/Throat: No oropharyngeal exudate.   Eyes: Pupils are equal, round, and reactive to light. Conjunctivae, EOM and lids are normal. Right eye exhibits no discharge. Left eye exhibits no discharge. No scleral icterus.   Neck: Trachea normal, normal range of motion and phonation normal. Neck supple. Carotid bruit is not present. No thyromegaly present.   Cardiovascular: Normal rate, regular rhythm and normal heart sounds.  Exam reveals no gallop and no friction rub.    No murmur heard.  Pulmonary/Chest: Effort normal and breath sounds normal. No stridor. She has no wheezes. She has no rales.   Abdominal: Soft. She exhibits no distension. There is no tenderness.   Musculoskeletal: Normal range of motion. She exhibits no edema.   Lymphadenopathy:     She has no cervical adenopathy.   Neurological: She is alert and oriented to person, place, and time. She has normal strength. No cranial nerve deficit or sensory deficit.   Cerebellar intact     Skin: Skin is warm, dry and intact. No petechiae and no rash noted. No cyanosis. Nails show no clubbing.   Psychiatric: She has a normal mood and affect. Her speech is normal and behavior is normal. Judgment and thought content normal. Cognition and memory are normal.   Nursing note and vitals reviewed.        Assessment/Plan   Brittany was seen today for tinnitus.    Diagnoses and all orders for this visit:    Tinnitus of left ear  -     Ambulatory Referral to ENT (Otolaryngology)    Need for immunization against influenza  -     Fluzone High Dose =>65Years      Patient Instructions   ER if neurological symptoms--speech, visual changes, etc    If you do not hear about the ear doctor appointment this week, call us      EMR Dragon/Transcription disclaimer:   Much of this encounter note is an electronic transcription/translation of  spoken language to printed text. The electronic translation of spoken language may permit erroneous, or at times, nonsensical words or phrases to be inadvertently transcribed; Although I have reviewed the note for such errors, some may still exist. Please contact me with any questions or concerns about the conduct of this encounter note.

## 2018-10-03 ENCOUNTER — ANTICOAGULATION VISIT (OUTPATIENT)
Dept: PHARMACY | Facility: HOSPITAL | Age: 83
End: 2018-10-03

## 2018-10-03 DIAGNOSIS — I48.0 PAROXYSMAL ATRIAL FIBRILLATION (HCC): ICD-10-CM

## 2018-10-03 NOTE — PROGRESS NOTES
This visit is for documentation purposes only. Patient called because they missed a dose last night. Reassured her that this was ok and to take a full 5mg tomorrow, Thursday 10/4, instead of her usual 2.5mg. She verbally agreed and did not have any additional questions.    Thank you,  Sanna Allen, Jamar[facility]

## 2018-10-04 NOTE — TELEPHONE ENCOUNTER
Neck talked to patient regarding echo and Holter results.  I reviewed with her that she has rapid heart beat and increasing medications or adding medications were likely due to worsening of her slow heartbeat.  Has no symptoms at all chest pain shortness of breath or palpitations and feels well.  I told her that she still has significant slow heartbeat which may become symptomatic without warning at times such as when she's driving and that could be quite dangerous.  She is not interested in considering pacemaker or additional medications.  I also recommended that she consider possible alternative treatment options for sleep apnea including but not limited to the use of oxygen.  She states that by my explanation she prefers to have come in for a visit with her daughter for another explanation and may consider treatment options at that time.    Whit, please see if he can get her in with her daughter in the next 2-3 weeks. aravind

## 2018-10-24 ENCOUNTER — TELEPHONE (OUTPATIENT)
Dept: CARDIOLOGY | Facility: CLINIC | Age: 83
End: 2018-10-24

## 2018-10-24 ENCOUNTER — OFFICE VISIT (OUTPATIENT)
Dept: CARDIOLOGY | Facility: CLINIC | Age: 83
End: 2018-10-24

## 2018-10-24 ENCOUNTER — ANTICOAGULATION VISIT (OUTPATIENT)
Dept: PHARMACY | Facility: HOSPITAL | Age: 83
End: 2018-10-24

## 2018-10-24 VITALS
BODY MASS INDEX: 38.76 KG/M2 | HEIGHT: 64 IN | HEART RATE: 64 BPM | SYSTOLIC BLOOD PRESSURE: 108 MMHG | DIASTOLIC BLOOD PRESSURE: 65 MMHG | WEIGHT: 227 LBS

## 2018-10-24 DIAGNOSIS — I45.9 HEART BLOCK: ICD-10-CM

## 2018-10-24 DIAGNOSIS — R00.1 BRADYCARDIA: Primary | ICD-10-CM

## 2018-10-24 DIAGNOSIS — I48.0 PAROXYSMAL ATRIAL FIBRILLATION (HCC): ICD-10-CM

## 2018-10-24 DIAGNOSIS — I48.0 PAF (PAROXYSMAL ATRIAL FIBRILLATION) (HCC): ICD-10-CM

## 2018-10-24 DIAGNOSIS — G47.33 OSA (OBSTRUCTIVE SLEEP APNEA): ICD-10-CM

## 2018-10-24 LAB
INR PPP: 3.5 (ref 0.91–1.09)
PROTHROMBIN TIME: 41.5 SECONDS (ref 10–13.8)

## 2018-10-24 PROCEDURE — G0463 HOSPITAL OUTPT CLINIC VISIT: HCPCS | Performed by: PHARMACIST

## 2018-10-24 PROCEDURE — 36416 COLLJ CAPILLARY BLOOD SPEC: CPT

## 2018-10-24 PROCEDURE — 85610 PROTHROMBIN TIME: CPT

## 2018-10-24 PROCEDURE — 99215 OFFICE O/P EST HI 40 MIN: CPT | Performed by: INTERNAL MEDICINE

## 2018-10-24 NOTE — PROGRESS NOTES
Anticoagulation Clinic Progress Note    Anticoagulation Summary  As of 10/24/2018    INR goal:   2.0-3.0   TTR:   44.4 % (2.4 wk)   Today's INR:   3.5!   Warfarin maintenance plan:   2.5 mg on Thu, Sat; 5 mg all other days   Weekly warfarin total:   30 mg   Plan last modified:   [facility] (9/27/2018)   Next INR check:   11/7/2018   Priority:   High   Target end date:   Indefinite    Indications    Paroxysmal atrial fibrillation (CMS/HCC) [I48.0]             Anticoagulation Episode Summary     INR check location:       Preferred lab:       Send INR reminders to:   [facility] [place]    Comments:         Anticoagulation Care Providers     Provider Role Specialty Phone number    Mary Grace Culp MD Referring Cardiology [phone]    [facility] Responsible [facility]           Drug interactions: has remained unchanged.  Diet: has remained unchanged.    Clinic Interview:  Patient Findings     Negatives:   Signs/symptoms of thrombosis, Signs/symptoms of bleeding,   Laboratory test error suspected, Change in health, Change in alcohol use,   Change in activity, Upcoming invasive procedure, Emergency department   visit, Upcoming dental procedure, Missed doses, Extra doses, Change in   medications, Change in diet/appetite, Hospital admission, Bruising, Other   complaints      Clinical Outcomes     Negatives:   Major bleeding event, Thromboembolic event,   Anticoagulation-related hospital admission, Anticoagulation-related ED   visit, Anticoagulation-related fatality        INR History:  Anticoagulation Monitoring 9/27/2018 10/3/2018 10/24/2018   INR 2.1 - 3.5   INR Date 9/27/2018 - 10/24/2018   INR Goal 2.0-3.0 2.0-3.0 2.0-3.0   Trend - Same Same   Last Week Total 0 mg 20 mg 30 mg   Next Week Total 30 mg 32.5 mg 27.5 mg   Sun 5 mg 5 mg 5 mg   Mon 5 mg 5 mg 5 mg   Tue 5 mg 5 mg 5 mg   Wed 5 mg 5 mg 2.5 mg (10/24); Otherwise 5 mg   Thu 2.5 mg 5 mg (10/4); Otherwise 2.5 mg 2.5 mg   Fri 5 mg 5 mg 5  mg   Sat 2.5 mg 2.5 mg 2.5 mg   Visit Report - - -   Some recent data might be hidden       Plan:  1. INR is therapeutic today- see above in Anticoagulation Summary.   Will instruct Brittany DELROY RichmondVilleda to continue their warfarin regimen- see above in Anticoagulation Summary.  2. Follow up in 2 weeks.  3. Patient declines warfarin refills.  4. Verbal and written information provided. Patient expresses understanding and has no further questions at this time.    Seema Lambert

## 2018-10-24 NOTE — PROGRESS NOTES
Anticoagulation Clinic Progress Note    Anticoagulation Summary  As of 10/24/2018    INR goal:   2.0-3.0   TTR:   44.4 % (2.4 wk)   Today's INR:   3.5!   Warfarin maintenance plan:   2.5 mg on Thu, Sat; 5 mg all other days   Weekly warfarin total:   30 mg   Plan last modified:   [facility]ice [facility] (9/27/2018)   Next INR check:   11/7/2018   Priority:   High   Target end date:   Indefinite    Indications    Paroxysmal atrial fibrillation (CMS/HCC) [I48.0]             Anticoagulation Episode Summary     INR check location:       Preferred lab:       Send INR reminders to:   [facility] [place]    Comments:         Anticoagulation Care Providers     Provider Role Specialty Phone number    Mary Grace Culp MD Referring Cardiology [phone]    [facility] Responsible Pharmacy           Drug interactions: has remained unchanged.  Diet: has remained unchanged.    Clinic Interview:  Patient Findings     Negatives:   Signs/symptoms of thrombosis, Signs/symptoms of bleeding,   Laboratory test error suspected, Change in health, Change in alcohol use,   Change in activity, Upcoming invasive procedure, Emergency department   visit, Upcoming dental procedure, Missed doses, Extra doses, Change in   medications, Change in diet/appetite, Hospital admission, Bruising, Other   complaints      Clinical Outcomes     Negatives:   Major bleeding event, Thromboembolic event,   Anticoagulation-related hospital admission, Anticoagulation-related ED   visit, Anticoagulation-related fatality        INR History:  Anticoagulation Monitoring 9/27/2018 10/3/2018 10/24/2018   INR 2.1 - 3.5   INR Date 9/27/2018 - 10/24/2018   INR Goal 2.0-3.0 2.0-3.0 2.0-3.0   Trend - Same Same   Last Week Total 0 mg 20 mg 30 mg   Next Week Total 30 mg 32.5 mg 27.5 mg   Sun 5 mg 5 mg 5 mg   Mon 5 mg 5 mg 5 mg   Tue 5 mg 5 mg 5 mg   Wed 5 mg 5 mg 2.5 mg (10/24); Otherwise 5 mg   Thu 2.5 mg 5 mg (10/4); Otherwise 2.5 mg 2.5 mg   Fri 5 mg 5 mg 5  mg   Sat 2.5 mg 2.5 mg 2.5 mg   Visit Report - - -   Some recent data might be hidden       Plan:  1. INR is Supratherapeutic today- see above in Anticoagulation Summary.  Will instruct Brittany Villeda to Change their warfarin regimen- see above in Anticoagulation Summary.  2. Follow up in 2 weeks  3. Patient declines warfarin refills.  4. Verbal and written information provided. Patient expresses understanding and has no further questions at this time.    Sanna Allen, [facility]

## 2018-10-28 PROCEDURE — 93000 ELECTROCARDIOGRAM COMPLETE: CPT | Performed by: INTERNAL MEDICINE

## 2018-10-30 ENCOUNTER — OFFICE VISIT (OUTPATIENT)
Dept: CARDIOLOGY | Facility: CLINIC | Age: 83
End: 2018-10-30

## 2018-10-30 VITALS
BODY MASS INDEX: 39.44 KG/M2 | HEIGHT: 64 IN | SYSTOLIC BLOOD PRESSURE: 128 MMHG | WEIGHT: 231 LBS | HEART RATE: 59 BPM | DIASTOLIC BLOOD PRESSURE: 78 MMHG

## 2018-10-30 DIAGNOSIS — I45.9 HEART BLOCK: Primary | ICD-10-CM

## 2018-10-30 PROCEDURE — 93000 ELECTROCARDIOGRAM COMPLETE: CPT | Performed by: INTERNAL MEDICINE

## 2018-10-30 PROCEDURE — 99204 OFFICE O/P NEW MOD 45 MIN: CPT | Performed by: INTERNAL MEDICINE

## 2018-10-31 NOTE — PROGRESS NOTES
Date of Office Visit: 10/30/2018  Encounter Provider: Jeuss Granda MD  Place of Service: [facility] CARDIOLOGY  Patient Name: Brittany Villeda  :1935    Chief Complaint   Patient presents with   • Atrial Fibrillation     New Patient Consult / MKD ref Bradycardia   :     HPI: Brittany Villeda is a 83 y.o. female who presents today for evaluation of high degree heart block seen on monitor.  Patient has occasional intermittent episodes of dizziness.  He isn't been present for years.  She notices no associated or modifying factors.  She had a previous EKG revealing pauses up to 3 seconds.  She recently wore is no patch which demonstrated high-grade heart block with multiple nonconducted P waves.  She denies any syncope or presyncope.          Past Medical History:   Diagnosis Date   • Anemia    • Arthritis    • Asthma    • Chest pain    • Colitis    • COPD (chronic obstructive pulmonary disease) (CMS/HCC)    • Diastolic dysfunction    • Essential hypertension 2016   • GERD (gastroesophageal reflux disease)    • Hypertension    • Hypertensive heart disease    • Hyperthyroidism    • Kidney stone    • Low back pain    • MGUS (monoclonal gammopathy of unknown significance)    • Nephrolithiasis    • Obesity    • Paroxysmal atrial fibrillation (CMS/HCC)    • Peptic ulceration    • Pulmonary hypertension (CMS/HCC)    • Sleep apnea    • Ventricular tachycardia (CMS/HCC)     nonsustained   • Vertigo        Past Surgical History:   Procedure Laterality Date   • BACK SURGERY      lumbar fusion   • CHOLECYSTECTOMY     • COLONOSCOPY  2014    colitis, cryptitis,  tics, NBIH, TA w/low grade dysplasia   • HEMORRHOIDECTOMY     • HYSTERECTOMY     • KNEE ARTHROPLASTY     • MYOMECTOMY     • SHOULDER SURGERY     • SINUS SURGERY     • TONSILLECTOMY         Social History     Social History   • Marital status:      Spouse name: N/A   • Number of children: 10   • Years of  education: High School     Occupational History   • Caregiver Retired     worked for [employer]     Social History Main Topics   • Smoking status: Former Smoker     Packs/day: 1.50     Years: 10.00   • Smokeless tobacco: Never Used   • Alcohol use No      Comment: Daily caffeine use   • Drug use: No   • Sexual activity: No     Other Topics Concern   • Not on file     Social History Narrative   • No narrative on file       Family History   Problem Relation Age of Onset   • Diabetes Mother    • Breast cancer Sister    • Kidney cancer Sister    • Heart disease Sister    • Prostate cancer Brother    • Prostate cancer Brother    • Prostate cancer Brother        Review of Systems   Constitution: Negative for weakness and malaise/fatigue.   HENT: Negative.    Eyes: Negative.    Cardiovascular: Negative for chest pain, dyspnea on exertion, leg swelling and near-syncope.   Respiratory: Negative for cough and shortness of breath.    Endocrine: Negative.    Hematologic/Lymphatic: Negative.    Skin: Negative.    Musculoskeletal: Negative.    Gastrointestinal: Negative.    Genitourinary: Negative.    Neurological: Positive for dizziness and light-headedness.   Psychiatric/Behavioral: Negative.    Allergic/Immunologic: Negative.        Allergies   Allergen Reactions   • Amitriptyline    • Amoxicillin-Pot Clavulanate    • Aspirin    • Bactrim [Sulfamethoxazole-Trimethoprim]    • Codeine    • Iodinated Diagnostic Agents    • Latex    • Naproxen    • Nsaids    • Soma Compound With Codeine [Carisoprodol-Aspirin-Codeine]    • Sulfa Antibiotics          Current Outpatient Prescriptions:   •  Acetaminophen (PAIN RELIEVER PO), Take  by mouth As Needed., Disp: , Rfl:   •  calcium carbonate (OS-ALIDA) 600 MG tablet, Take 600 mg by mouth Daily., Disp: , Rfl:   •  ferrous sulfate 325 (65 FE) MG tablet, Take 1 tablet by mouth Daily With Breakfast. (Patient taking differently: Take 325 mg by mouth. Take 1 po every other week),  "Disp: , Rfl:   •  furosemide (LASIX) 20 MG tablet, Take 2 tablets by mouth Every Morning. 20mg in the PM, Disp: 180 tablet, Rfl: 1  •  gabapentin (NEURONTIN) 300 MG capsule, Take 1 capsule by mouth 2 (Two) Times a Day. (Patient taking differently: Take 300 mg by mouth 3 (Three) Times a Day.), Disp: 180 capsule, Rfl: 1  •  mesalamine (LIALDA) 1.2 g EC tablet, Take 4 tablets by mouth Daily., Disp: 360 tablet, Rfl: 0  •  mometasone (ELOCON) 0.1 % ointment, Apply 1 application topically to the appropriate area as directed 2 (Two) Times a Day. As needed, Disp: , Rfl:   •  omeprazole (priLOSEC) 20 MG capsule, TAKE 1 CAPSULE EVERY DAY, Disp: 90 capsule, Rfl: 0  •  vitamin B-12 (CYANOCOBALAMIN) 1000 MCG tablet, Take 1,000 mcg by mouth Daily., Disp: , Rfl:   •  warfarin (COUMADIN) 5 MG tablet, Take 1 tablet by mouth Daily., Disp: 90 tablet, Rfl: 1      Objective:     Vitals:    10/30/18 1246   BP: 128/78   Pulse: 59   Weight: 105 kg (231 lb)   Height: 162.6 cm (64\")     Body mass index is 39.65 kg/m².    PHYSICAL EXAM:    Physical Exam   Constitutional: She is oriented to person, place, and time. She appears well-developed and well-nourished. No distress.   HENT:   Head: Normocephalic and atraumatic.   Right Ear: External ear normal.   Left Ear: External ear normal.   Eyes: EOM are normal. Right eye exhibits no discharge. Left eye exhibits no discharge.   Neck: Normal range of motion. Neck supple. No thyromegaly present.   Cardiovascular: Normal rate.  An irregular rhythm present.   No murmur heard.  Pulmonary/Chest: Effort normal and breath sounds normal. No respiratory distress.   Abdominal: Soft. Bowel sounds are normal. She exhibits no distension. There is no tenderness.   Musculoskeletal: Normal range of motion.   Neurological: She is alert and oriented to person, place, and time. She has normal reflexes.   Skin: Skin is warm. She is not diaphoretic.   Psychiatric: She has a normal mood and affect. Her behavior is " normal. Judgment and thought content normal.   Nursing note and vitals reviewed.          ECG 12 Lead  Date/Time: 10/30/2018 7:17 PM  Performed by: ZABRINA GRANDA  Authorized by: ZABRINA GRANDA   Comparison: compared with previous ECG   Similar to previous ECG  Comparison to previous ECG: Similar to previous EKG  Rhythm: sinus rhythm and bundle branch block  Conduction: complete RBBB and LAFB  Clinical impression: abnormal ECG  Comments: Sinus arrhythmia with rapid bundle branch block and left anterior fascicular block           I personally reviewed her Zio patch tracings   Assessment:       Diagnosis Plan   1. Heart block  Case Request EP Lab: Pacemaker DC new          Plan:       The patient has evidence of high-grade heart  on her heart monitor.  She has some intermittent dizziness.  She has had evidence of high-grade heart block previously.  We discussed the risk and benefits of a pacemaker.  She was agreeable to proceeding we will get her on the schedule the next week or so.    As always, it has been a pleasure to participate in your patient's care.      Sincerely,         Zabrina Granda MD

## 2018-11-01 NOTE — TELEPHONE ENCOUNTER
I talked with Dr. Krause who is fine with proceeding with CT angiogram in this setting.  I contacted the patient who states that she has blood work and an appointment with Dr. English on Monday.  She is not certain when she wants to the CT scan of her chest for thoracic aortic aneurysm as discussed at the last visit and will talk with her daughter was not available at that time and call back.  He did tell her this was not urgent and could be done early next week or 4-5 weeks after the pacemaker has been placed.  Whit, please contact the daughter and find out when they 1 do the CT angiogram of the chest.  aravind

## 2018-11-02 RX ORDER — GABAPENTIN 300 MG/1
CAPSULE ORAL
Qty: 180 CAPSULE | Refills: 1 | Status: SHIPPED | OUTPATIENT
Start: 2018-11-02 | End: 2018-12-17

## 2018-11-02 NOTE — TELEPHONE ENCOUNTER
Spoke with pt.  She has a lot of things going on Monday and would like to wait until after Pacemaker placed.  Can you place order.  Pt is aware to schedule CTA 4-5 weeks after PM placement.

## 2018-11-05 ENCOUNTER — TRANSCRIBE ORDERS (OUTPATIENT)
Dept: LAB | Facility: HOSPITAL | Age: 83
End: 2018-11-05

## 2018-11-05 ENCOUNTER — OFFICE VISIT (OUTPATIENT)
Dept: GASTROENTEROLOGY | Facility: CLINIC | Age: 83
End: 2018-11-05

## 2018-11-05 ENCOUNTER — LAB (OUTPATIENT)
Dept: LAB | Facility: HOSPITAL | Age: 83
End: 2018-11-05
Attending: INTERNAL MEDICINE

## 2018-11-05 VITALS
SYSTOLIC BLOOD PRESSURE: 132 MMHG | WEIGHT: 233 LBS | HEIGHT: 64 IN | DIASTOLIC BLOOD PRESSURE: 90 MMHG | TEMPERATURE: 98.2 F | BODY MASS INDEX: 39.78 KG/M2

## 2018-11-05 DIAGNOSIS — Z13.6 SCREENING FOR HYPERTENSION: ICD-10-CM

## 2018-11-05 DIAGNOSIS — Z01.810 PRE-OPERATIVE CARDIOVASCULAR EXAMINATION: ICD-10-CM

## 2018-11-05 DIAGNOSIS — I45.9 HEART BLOCK: ICD-10-CM

## 2018-11-05 DIAGNOSIS — K51.30 ULCERATIVE RECTOSIGMOIDITIS WITHOUT COMPLICATION (HCC): Primary | ICD-10-CM

## 2018-11-05 DIAGNOSIS — Z01.818 PRE-OP EXAM: ICD-10-CM

## 2018-11-05 DIAGNOSIS — K21.9 GASTROESOPHAGEAL REFLUX DISEASE WITHOUT ESOPHAGITIS: ICD-10-CM

## 2018-11-05 DIAGNOSIS — Z13.6 SCREENING FOR ISCHEMIC HEART DISEASE: ICD-10-CM

## 2018-11-05 DIAGNOSIS — I45.9 CONDUCTION DISORDER, UNSPECIFIED: ICD-10-CM

## 2018-11-05 DIAGNOSIS — Z01.818 PRE-OP EXAM: Primary | ICD-10-CM

## 2018-11-05 LAB
ANION GAP SERPL CALCULATED.3IONS-SCNC: 12.4 MMOL/L
BASOPHILS # BLD AUTO: 0.01 10*3/MM3 (ref 0–0.2)
BASOPHILS NFR BLD AUTO: 0.4 % (ref 0–1.5)
BUN BLD-MCNC: 12 MG/DL (ref 8–23)
BUN/CREAT SERPL: 15.6 (ref 7–25)
CALCIUM SPEC-SCNC: 9.9 MG/DL (ref 8.6–10.5)
CHLORIDE SERPL-SCNC: 107 MMOL/L (ref 98–107)
CO2 SERPL-SCNC: 28.6 MMOL/L (ref 22–29)
CREAT BLD-MCNC: 0.77 MG/DL (ref 0.57–1)
DEPRECATED RDW RBC AUTO: 54.4 FL (ref 37–54)
EOSINOPHIL # BLD AUTO: 0 10*3/MM3 (ref 0–0.7)
EOSINOPHIL NFR BLD AUTO: 0 % (ref 0.3–6.2)
ERYTHROCYTE [DISTWIDTH] IN BLOOD BY AUTOMATED COUNT: 14.9 % (ref 11.7–13)
GFR SERPL CREATININE-BSD FRML MDRD: 87 ML/MIN/1.73
GLUCOSE BLD-MCNC: 93 MG/DL (ref 65–99)
HCT VFR BLD AUTO: 37.1 % (ref 35.6–45.5)
HGB BLD-MCNC: 11.9 G/DL (ref 11.9–15.5)
IMM GRANULOCYTES # BLD: 0 10*3/MM3 (ref 0–0.03)
IMM GRANULOCYTES NFR BLD: 0 % (ref 0–0.5)
INR PPP: 2.35 (ref 0.9–1.1)
LYMPHOCYTES # BLD AUTO: 1.4 10*3/MM3 (ref 0.9–4.8)
LYMPHOCYTES NFR BLD AUTO: 52.6 % (ref 19.6–45.3)
MCH RBC QN AUTO: 31.8 PG (ref 26.9–32)
MCHC RBC AUTO-ENTMCNC: 32.1 G/DL (ref 32.4–36.3)
MCV RBC AUTO: 99.2 FL (ref 80.5–98.2)
MONOCYTES # BLD AUTO: 0.25 10*3/MM3 (ref 0.2–1.2)
MONOCYTES NFR BLD AUTO: 9.4 % (ref 5–12)
NEUTROPHILS # BLD AUTO: 1 10*3/MM3 (ref 1.9–8.1)
NEUTROPHILS NFR BLD AUTO: 37.6 % (ref 42.7–76)
PLATELET # BLD AUTO: 155 10*3/MM3 (ref 140–500)
PMV BLD AUTO: 9.8 FL (ref 6–12)
POTASSIUM BLD-SCNC: 3.5 MMOL/L (ref 3.5–5.2)
PROTHROMBIN TIME: 25.4 SECONDS (ref 11.7–14.2)
RBC # BLD AUTO: 3.74 10*6/MM3 (ref 3.9–5.2)
SODIUM BLD-SCNC: 148 MMOL/L (ref 136–145)
WBC NRBC COR # BLD: 2.66 10*3/MM3 (ref 4.5–10.7)

## 2018-11-05 PROCEDURE — 85610 PROTHROMBIN TIME: CPT

## 2018-11-05 PROCEDURE — 85025 COMPLETE CBC W/AUTO DIFF WBC: CPT

## 2018-11-05 PROCEDURE — 80048 BASIC METABOLIC PNL TOTAL CA: CPT

## 2018-11-05 PROCEDURE — 36415 COLL VENOUS BLD VENIPUNCTURE: CPT

## 2018-11-05 PROCEDURE — 99213 OFFICE O/P EST LOW 20 MIN: CPT | Performed by: INTERNAL MEDICINE

## 2018-11-05 NOTE — PROGRESS NOTES
Chief Complaint   Patient presents with   • Follow-up   • Ulcerative rectosigmoiditis       Brittany Villeda is a  83 y.o. 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  Last EGD 2014 - no esophageal abnormalities.  Last seen in the office 5/18.    Bowels moving regularly - soft BM - no blood in stool.  Has a little urgency with breakfast - doesn't happen any other time.  No incontinence.  She takes 4.8 gm lialda daily - [employer] is no longer covering this med as of 1/1/19 - apriso is a covered alternative.    No issues with acid reflux at present. She doesn't eat late to prevent acid reflux. She takes omeprazole 20 mg daily - she was taking 40 mg - she did not note a difference when we decreased her dose.    She has rcvd her flu shot.  .   HPI  Past Medical History:   Diagnosis Date   • Anemia    • Arthritis    • Asthma    • Chest pain    • Colitis    • COPD (chronic obstructive pulmonary disease) (CMS/HCC)    • Diastolic dysfunction    • Essential hypertension 5/12/2016   • GERD (gastroesophageal reflux disease)    • Hypertension    • Hypertensive heart disease    • Hyperthyroidism    • Kidney stone    • Low back pain    • MGUS (monoclonal gammopathy of unknown significance)    • Nephrolithiasis    • Obesity    • Paroxysmal atrial fibrillation (CMS/HCC)    • Peptic ulceration    • Pulmonary hypertension (CMS/HCC)    • Sleep apnea    • Ventricular tachycardia (CMS/HCC)     nonsustained   • Vertigo      Past Surgical History:   Procedure Laterality Date   • BACK SURGERY      lumbar fusion   • CHOLECYSTECTOMY     • COLONOSCOPY  06/16/2014    colitis, cryptitis,  tics, NBIH, TA w/low grade dysplasia   • HEMORRHOIDECTOMY     • HYSTERECTOMY     • KNEE ARTHROPLASTY     • MYOMECTOMY     • SHOULDER SURGERY     • SINUS SURGERY     • TONSILLECTOMY         Current Outpatient Prescriptions:   •  Acetaminophen (PAIN RELIEVER PO), Take  by mouth As Needed., Disp: , Rfl:   •   calcium carbonate (OS-ALIDA) 600 MG tablet, Take 600 mg by mouth Daily., Disp: , Rfl:   •  ferrous sulfate 325 (65 FE) MG tablet, Take 1 tablet by mouth Daily With Breakfast. (Patient taking differently: Take 325 mg by mouth. Take 1 po every other week), Disp: , Rfl:   •  furosemide (LASIX) 20 MG tablet, Take 2 tablets by mouth Every Morning. 20mg in the PM, Disp: 180 tablet, Rfl: 1  •  gabapentin (NEURONTIN) 300 MG capsule, TAKE 1 CAPSULE TWICE DAILY, Disp: 180 capsule, Rfl: 1  •  mesalamine (LIALDA) 1.2 g EC tablet, Take 4 tablets by mouth Daily., Disp: 360 tablet, Rfl: 0  •  mometasone (ELOCON) 0.1 % ointment, Apply 1 application topically to the appropriate area as directed 2 (Two) Times a Day. As needed, Disp: , Rfl:   •  omeprazole (priLOSEC) 20 MG capsule, TAKE 1 CAPSULE EVERY DAY, Disp: 90 capsule, Rfl: 0  •  vitamin B-12 (CYANOCOBALAMIN) 1000 MCG tablet, Take 1,000 mcg by mouth Daily., Disp: , Rfl:   •  warfarin (COUMADIN) 5 MG tablet, Take 1 tablet by mouth Daily., Disp: 90 tablet, Rfl: 1  PRN Meds:.  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[employer]     Social History Main Topics   • Smoking status: Former Smoker     Packs/day: 1.50     Years: 10.00   • Smokeless tobacco: Never Used   • Alcohol use No      Comment: Daily caffeine use   • Drug use: No   • Sexual activity: No     Other Topics Concern   • Not on file     Social History Narrative   • No narrative on file     Family History   Problem Relation Age of Onset    • Diabetes Mother    • Breast cancer Sister    • Kidney cancer Sister    • Heart disease Sister    • Prostate cancer Brother    • Prostate cancer Brother    • Prostate cancer Brother      Review of Systems   Constitutional: Negative for appetite change and unexpected weight change.   Cardiovascular: Negative for palpitations.   Gastrointestinal: Negative for abdominal pain, blood in stool and diarrhea.   All other systems reviewed and are negative.    Vitals:    11/05/18 0830   BP: 132/90   Temp: 98.2 °F (36.8 °C)     1    11/05/18  0830   Weight: 106 kg (233 lb)     Physical Exam   Constitutional: She appears well-developed and well-nourished.   HENT:   Head: Normocephalic and atraumatic.   Eyes: No scleral icterus.   Pulmonary/Chest: Effort normal.   Abdominal: She exhibits no distension.   Neurological: She is alert.   Skin: Skin is warm and dry.   Psychiatric: She has a normal mood and affect.     No images are attached to the encounter.  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

## 2018-11-05 NOTE — TELEPHONE ENCOUNTER
She should have a follow-up appointment around 4-6 weeks after permanent pacemaker placement and we'll order the CT then. aravind

## 2018-11-06 ENCOUNTER — TELEPHONE (OUTPATIENT)
Dept: CARDIOLOGY | Facility: CLINIC | Age: 83
End: 2018-11-06

## 2018-11-06 NOTE — TELEPHONE ENCOUNTER
----- Message from Jesus Granda MD sent at 11/5/2018  4:51 PM EST -----  That will be fine.  I just do it on her coumadin.      ----- Message -----  From: Geetha Lassiter  Sent: 11/5/2018   2:06 PM  To: SHAHID Farias Kevin Wayne Parrott, MD Dr Parrott    Her INR today is 2.35.    Geetha LANG  ----- Message -----  From: Jesus Granda MD  Sent: 10/30/2018   5:32 PM  To: Geetha Lassiter    Yes     ----- Message -----  From: Geetha Lassiter  Sent: 10/30/2018   2:05 PM  To: Jesus Granda MD    Pt is scheduled for 11/7.    Shes going to have her labs drawn on Monday 11/5, is that enough time to let her know if she needs to hold her Warfarin or not?    Geetha LANG      ----- Message -----  From: Jesus Granda MD  Sent: 10/30/2018   1:43 PM  To: SHAHID Farias Taylor O Brewster    Please schedule for a dual chamber pacemaker with [facility] [employer].  Check INR few days before, needs to be 2-3

## 2018-11-06 NOTE — PERIOPERATIVE NURSING NOTE
Called and spoke with pt confirming arrival time and need to be NPO after MN. May take am meds with the exception of diuretics. Pt [place] [facility] [place]. Family to accompany pt to hospital in the am

## 2018-11-07 ENCOUNTER — HOSPITAL ENCOUNTER (OUTPATIENT)
Facility: HOSPITAL | Age: 83
Discharge: HOME | End: 2018-11-08
Attending: INTERNAL MEDICINE | Admitting: INTERNAL MEDICINE

## 2018-11-07 ENCOUNTER — APPOINTMENT (OUTPATIENT)
Dept: GENERAL RADIOLOGY | Facility: HOSPITAL | Age: 83
End: 2018-11-07

## 2018-11-07 DIAGNOSIS — I45.9 HEART BLOCK: ICD-10-CM

## 2018-11-07 DIAGNOSIS — I48.0 PAROXYSMAL ATRIAL FIBRILLATION (HCC): Primary | ICD-10-CM

## 2018-11-07 LAB
INR PPP: 1.8 (ref 0.8–1.2)
INR PPP: 1.8 (ref 0.9–1.1)
PROTHROMBIN TIME: 21.6 SECONDS
PROTHROMBIN TIME: 21.6 SECONDS (ref 12.8–15.2)

## 2018-11-07 PROCEDURE — 93005 ELECTROCARDIOGRAM TRACING: CPT | Performed by: INTERNAL MEDICINE

## 2018-11-07 PROCEDURE — G0378 HOSPITAL OBSERVATION PER HR: HCPCS

## 2018-11-07 PROCEDURE — C1898 LEAD, PMKR, OTHER THAN TRANS: HCPCS | Performed by: INTERNAL MEDICINE

## 2018-11-07 PROCEDURE — 25010000002 DIPHENHYDRAMINE PER 50 MG: Performed by: INTERNAL MEDICINE

## 2018-11-07 PROCEDURE — 63710000001 WARFARIN 5 MG TABLET: Performed by: INTERNAL MEDICINE

## 2018-11-07 PROCEDURE — A9270 NON-COVERED ITEM OR SERVICE: HCPCS | Performed by: INTERNAL MEDICINE

## 2018-11-07 PROCEDURE — 93010 ELECTROCARDIOGRAM REPORT: CPT | Performed by: INTERNAL MEDICINE

## 2018-11-07 PROCEDURE — 25010000002 MIDAZOLAM PER 1 MG: Performed by: INTERNAL MEDICINE

## 2018-11-07 PROCEDURE — 85610 PROTHROMBIN TIME: CPT

## 2018-11-07 PROCEDURE — 33208 INSRT HEART PM ATRIAL & VENT: CPT | Performed by: INTERNAL MEDICINE

## 2018-11-07 PROCEDURE — 63710000001 ACETAMINOPHEN 325 MG TABLET: Performed by: INTERNAL MEDICINE

## 2018-11-07 PROCEDURE — 25010000002 FENTANYL CITRATE (PF) 100 MCG/2ML SOLUTION: Performed by: INTERNAL MEDICINE

## 2018-11-07 PROCEDURE — 63710000001 GABAPENTIN 300 MG CAPSULE: Performed by: INTERNAL MEDICINE

## 2018-11-07 PROCEDURE — 25010000002 VANCOMYCIN PER 500 MG: Performed by: INTERNAL MEDICINE

## 2018-11-07 PROCEDURE — C1894 INTRO/SHEATH, NON-LASER: HCPCS | Performed by: INTERNAL MEDICINE

## 2018-11-07 PROCEDURE — C1785 PMKR, DUAL, RATE-RESP: HCPCS | Performed by: INTERNAL MEDICINE

## 2018-11-07 PROCEDURE — 99152 MOD SED SAME PHYS/QHP 5/>YRS: CPT | Performed by: INTERNAL MEDICINE

## 2018-11-07 PROCEDURE — 71045 X-RAY EXAM CHEST 1 VIEW: CPT

## 2018-11-07 PROCEDURE — 25010000002 METHYLPREDNISOLONE PER 125 MG: Performed by: INTERNAL MEDICINE

## 2018-11-07 PROCEDURE — 63710000001 ACETAMINOPHEN 500 MG TABLET: Performed by: INTERNAL MEDICINE

## 2018-11-07 PROCEDURE — 99153 MOD SED SAME PHYS/QHP EA: CPT | Performed by: INTERNAL MEDICINE

## 2018-11-07 DEVICE — ENDOCARDIAL STEROID-ELUTING MR CONDITIONAL STYLET DELIVERED PACE/SENSE LEAD
Type: IMPLANTABLE DEVICE | Status: FUNCTIONAL
Brand: INGEVITY™ MRI

## 2018-11-07 DEVICE — PACEMAKER
Type: IMPLANTABLE DEVICE | Status: FUNCTIONAL
Brand: ACCOLADE™ MRI DR

## 2018-11-07 RX ORDER — LIDOCAINE HYDROCHLORIDE 10 MG/ML
0.1 INJECTION, SOLUTION EPIDURAL; INFILTRATION; INTRACAUDAL; PERINEURAL ONCE AS NEEDED
Status: DISCONTINUED | OUTPATIENT
Start: 2018-11-07 | End: 2018-11-07 | Stop reason: HOSPADM

## 2018-11-07 RX ORDER — ACETAMINOPHEN 325 MG/1
650 TABLET ORAL EVERY 4 HOURS PRN
Status: DISCONTINUED | OUTPATIENT
Start: 2018-11-07 | End: 2018-11-07

## 2018-11-07 RX ORDER — FENTANYL CITRATE 50 UG/ML
INJECTION, SOLUTION INTRAMUSCULAR; INTRAVENOUS AS NEEDED
Status: DISCONTINUED | OUTPATIENT
Start: 2018-11-07 | End: 2018-11-07 | Stop reason: HOSPADM

## 2018-11-07 RX ORDER — VANCOMYCIN HYDROCHLORIDE 1 G/200ML
INJECTION, SOLUTION INTRAVENOUS CONTINUOUS PRN
Status: COMPLETED | OUTPATIENT
Start: 2018-11-07 | End: 2018-11-07

## 2018-11-07 RX ORDER — GABAPENTIN 300 MG/1
300 CAPSULE ORAL 2 TIMES DAILY
Status: DISCONTINUED | OUTPATIENT
Start: 2018-11-07 | End: 2018-11-08 | Stop reason: HOSPADM

## 2018-11-07 RX ORDER — ACETAMINOPHEN 500 MG
1000 TABLET ORAL EVERY 6 HOURS PRN
Status: DISCONTINUED | OUTPATIENT
Start: 2018-11-07 | End: 2018-11-08 | Stop reason: HOSPADM

## 2018-11-07 RX ORDER — LIDOCAINE HYDROCHLORIDE 10 MG/ML
INJECTION, SOLUTION INFILTRATION; PERINEURAL AS NEEDED
Status: DISCONTINUED | OUTPATIENT
Start: 2018-11-07 | End: 2018-11-07 | Stop reason: HOSPADM

## 2018-11-07 RX ORDER — SODIUM CHLORIDE 9 MG/ML
75 INJECTION, SOLUTION INTRAVENOUS CONTINUOUS
Status: DISCONTINUED | OUTPATIENT
Start: 2018-11-07 | End: 2018-11-08 | Stop reason: HOSPADM

## 2018-11-07 RX ORDER — WARFARIN SODIUM 5 MG/1
5 TABLET ORAL
Status: DISCONTINUED | OUTPATIENT
Start: 2018-11-07 | End: 2018-11-08 | Stop reason: HOSPADM

## 2018-11-07 RX ORDER — ACETAMINOPHEN 650 MG/1
650 SUPPOSITORY RECTAL EVERY 4 HOURS PRN
Status: DISCONTINUED | OUTPATIENT
Start: 2018-11-07 | End: 2018-11-07

## 2018-11-07 RX ORDER — SODIUM CHLORIDE 0.9 % (FLUSH) 0.9 %
3 SYRINGE (ML) INJECTION EVERY 12 HOURS SCHEDULED
Status: DISCONTINUED | OUTPATIENT
Start: 2018-11-07 | End: 2018-11-07 | Stop reason: HOSPADM

## 2018-11-07 RX ORDER — SODIUM CHLORIDE 0.9 % (FLUSH) 0.9 %
3-10 SYRINGE (ML) INJECTION AS NEEDED
Status: DISCONTINUED | OUTPATIENT
Start: 2018-11-07 | End: 2018-11-07 | Stop reason: HOSPADM

## 2018-11-07 RX ORDER — SODIUM CHLORIDE 0.9 % (FLUSH) 0.9 %
3 SYRINGE (ML) INJECTION EVERY 12 HOURS SCHEDULED
Status: DISCONTINUED | OUTPATIENT
Start: 2018-11-07 | End: 2018-11-08 | Stop reason: HOSPADM

## 2018-11-07 RX ORDER — CHOLECALCIFEROL (VITAMIN D3) 125 MCG
1000 CAPSULE ORAL DAILY
Status: DISCONTINUED | OUTPATIENT
Start: 2018-11-07 | End: 2018-11-08 | Stop reason: HOSPADM

## 2018-11-07 RX ORDER — METHYLPREDNISOLONE SODIUM SUCCINATE 125 MG/2ML
125 INJECTION, POWDER, LYOPHILIZED, FOR SOLUTION INTRAMUSCULAR; INTRAVENOUS ONCE
Status: COMPLETED | OUTPATIENT
Start: 2018-11-07 | End: 2018-11-07

## 2018-11-07 RX ORDER — ACETAMINOPHEN 160 MG/5ML
650 SOLUTION ORAL EVERY 4 HOURS PRN
Status: DISCONTINUED | OUTPATIENT
Start: 2018-11-07 | End: 2018-11-07

## 2018-11-07 RX ORDER — SODIUM CHLORIDE 0.9 % (FLUSH) 0.9 %
3-10 SYRINGE (ML) INJECTION AS NEEDED
Status: DISCONTINUED | OUTPATIENT
Start: 2018-11-07 | End: 2018-11-08 | Stop reason: HOSPADM

## 2018-11-07 RX ORDER — MIDAZOLAM HYDROCHLORIDE 1 MG/ML
INJECTION INTRAMUSCULAR; INTRAVENOUS AS NEEDED
Status: DISCONTINUED | OUTPATIENT
Start: 2018-11-07 | End: 2018-11-07 | Stop reason: HOSPADM

## 2018-11-07 RX ORDER — MESALAMINE 1.2 G/1
4800 TABLET, DELAYED RELEASE ORAL DAILY
Status: DISCONTINUED | OUTPATIENT
Start: 2018-11-07 | End: 2018-11-08 | Stop reason: HOSPADM

## 2018-11-07 RX ORDER — DIPHENHYDRAMINE HYDROCHLORIDE 50 MG/ML
25 INJECTION INTRAMUSCULAR; INTRAVENOUS ONCE
Status: COMPLETED | OUTPATIENT
Start: 2018-11-07 | End: 2018-11-07

## 2018-11-07 RX ADMIN — ACETAMINOPHEN 1000 MG: 500 TABLET, FILM COATED ORAL at 16:52

## 2018-11-07 RX ADMIN — METHYLPREDNISOLONE SODIUM SUCCINATE 125 MG: 125 INJECTION, POWDER, FOR SOLUTION INTRAMUSCULAR; INTRAVENOUS at 09:40

## 2018-11-07 RX ADMIN — Medication 3 ML: at 16:53

## 2018-11-07 RX ADMIN — Medication 3 ML: at 20:59

## 2018-11-07 RX ADMIN — DIPHENHYDRAMINE HYDROCHLORIDE 25 MG: 50 INJECTION, SOLUTION INTRAMUSCULAR; INTRAVENOUS at 09:40

## 2018-11-07 RX ADMIN — GABAPENTIN 300 MG: 300 CAPSULE ORAL at 15:03

## 2018-11-07 RX ADMIN — WARFARIN SODIUM 5 MG: 5 TABLET ORAL at 19:49

## 2018-11-07 RX ADMIN — GABAPENTIN 300 MG: 300 CAPSULE ORAL at 20:59

## 2018-11-07 RX ADMIN — SODIUM CHLORIDE 75 ML/HR: 9 INJECTION, SOLUTION INTRAVENOUS at 09:38

## 2018-11-07 RX ADMIN — ACETAMINOPHEN 650 MG: 325 TABLET, FILM COATED ORAL at 12:09

## 2018-11-07 RX ADMIN — ACETAMINOPHEN 1000 MG: 500 TABLET, FILM COATED ORAL at 23:20

## 2018-11-07 NOTE — H&P (VIEW-ONLY)
Date of Office Visit: 10/30/2018  Encounter Provider: Jesus Granda MD  Place of Service: [facility] CARDIOLOGY  Patient Name: Brittany Villeda  :1935    Chief Complaint   Patient presents with   • Atrial Fibrillation     New Patient Consult / MKD ref Bradycardia   :     HPI: Brittany Villeda is a 83 y.o. female who presents today for evaluation of high degree heart block seen on monitor.  Patient has occasional intermittent episodes of dizziness.  He isn't been present for years.  She notices no associated or modifying factors.  She had a previous EKG revealing pauses up to 3 seconds.  She recently wore is no patch which demonstrated high-grade heart block with multiple nonconducted P waves.  She denies any syncope or presyncope.          Past Medical History:   Diagnosis Date   • Anemia    • Arthritis    • Asthma    • Chest pain    • Colitis    • COPD (chronic obstructive pulmonary disease) (CMS/HCC)    • Diastolic dysfunction    • Essential hypertension 2016   • GERD (gastroesophageal reflux disease)    • Hypertension    • Hypertensive heart disease    • Hyperthyroidism    • Kidney stone    • Low back pain    • MGUS (monoclonal gammopathy of unknown significance)    • Nephrolithiasis    • Obesity    • Paroxysmal atrial fibrillation (CMS/HCC)    • Peptic ulceration    • Pulmonary hypertension (CMS/HCC)    • Sleep apnea    • Ventricular tachycardia (CMS/HCC)     nonsustained   • Vertigo        Past Surgical History:   Procedure Laterality Date   • BACK SURGERY      lumbar fusion   • CHOLECYSTECTOMY     • COLONOSCOPY  2014    colitis, cryptitis,  tics, NBIH, TA w/low grade dysplasia   • HEMORRHOIDECTOMY     • HYSTERECTOMY     • KNEE ARTHROPLASTY     • MYOMECTOMY     • SHOULDER SURGERY     • SINUS SURGERY     • TONSILLECTOMY         Social History     Social History   • Marital status:      Spouse name: N/A   • Number of children: 10   • Years of  education: High School     Occupational History   • Caregiver Retired     worked for [employer]     Social History Main Topics   • Smoking status: Former Smoker     Packs/day: 1.50     Years: 10.00   • Smokeless tobacco: Never Used   • Alcohol use No      Comment: Daily caffeine use   • Drug use: No   • Sexual activity: No     Other Topics Concern   • Not on file     Social History Narrative   • No narrative on file       Family History   Problem Relation Age of Onset   • Diabetes Mother    • Breast cancer Sister    • Kidney cancer Sister    • Heart disease Sister    • Prostate cancer Brother    • Prostate cancer Brother    • Prostate cancer Brother        Review of Systems   Constitution: Negative for weakness and malaise/fatigue.   HENT: Negative.    Eyes: Negative.    Cardiovascular: Negative for chest pain, dyspnea on exertion, leg swelling and near-syncope.   Respiratory: Negative for cough and shortness of breath.    Endocrine: Negative.    Hematologic/Lymphatic: Negative.    Skin: Negative.    Musculoskeletal: Negative.    Gastrointestinal: Negative.    Genitourinary: Negative.    Neurological: Positive for dizziness and light-headedness.   Psychiatric/Behavioral: Negative.    Allergic/Immunologic: Negative.        Allergies   Allergen Reactions   • Amitriptyline    • Amoxicillin-Pot Clavulanate    • Aspirin    • Bactrim [Sulfamethoxazole-Trimethoprim]    • Codeine    • Iodinated Diagnostic Agents    • Latex    • Naproxen    • Nsaids    • Soma Compound With Codeine [Carisoprodol-Aspirin-Codeine]    • Sulfa Antibiotics          Current Outpatient Prescriptions:   •  Acetaminophen (PAIN RELIEVER PO), Take  by mouth As Needed., Disp: , Rfl:   •  calcium carbonate (OS-ALIDA) 600 MG tablet, Take 600 mg by mouth Daily., Disp: , Rfl:   •  ferrous sulfate 325 (65 FE) MG tablet, Take 1 tablet by mouth Daily With Breakfast. (Patient taking differently: Take 325 mg by mouth. Take 1 po every other week),  "Disp: , Rfl:   •  furosemide (LASIX) 20 MG tablet, Take 2 tablets by mouth Every Morning. 20mg in the PM, Disp: 180 tablet, Rfl: 1  •  gabapentin (NEURONTIN) 300 MG capsule, Take 1 capsule by mouth 2 (Two) Times a Day. (Patient taking differently: Take 300 mg by mouth 3 (Three) Times a Day.), Disp: 180 capsule, Rfl: 1  •  mesalamine (LIALDA) 1.2 g EC tablet, Take 4 tablets by mouth Daily., Disp: 360 tablet, Rfl: 0  •  mometasone (ELOCON) 0.1 % ointment, Apply 1 application topically to the appropriate area as directed 2 (Two) Times a Day. As needed, Disp: , Rfl:   •  omeprazole (priLOSEC) 20 MG capsule, TAKE 1 CAPSULE EVERY DAY, Disp: 90 capsule, Rfl: 0  •  vitamin B-12 (CYANOCOBALAMIN) 1000 MCG tablet, Take 1,000 mcg by mouth Daily., Disp: , Rfl:   •  warfarin (COUMADIN) 5 MG tablet, Take 1 tablet by mouth Daily., Disp: 90 tablet, Rfl: 1      Objective:     Vitals:    10/30/18 1246   BP: 128/78   Pulse: 59   Weight: 105 kg (231 lb)   Height: 162.6 cm (64\")     Body mass index is 39.65 kg/m².    PHYSICAL EXAM:    Physical Exam   Constitutional: She is oriented to person, place, and time. She appears well-developed and well-nourished. No distress.   HENT:   Head: Normocephalic and atraumatic.   Right Ear: External ear normal.   Left Ear: External ear normal.   Eyes: EOM are normal. Right eye exhibits no discharge. Left eye exhibits no discharge.   Neck: Normal range of motion. Neck supple. No thyromegaly present.   Cardiovascular: Normal rate.  An irregular rhythm present.   No murmur heard.  Pulmonary/Chest: Effort normal and breath sounds normal. No respiratory distress.   Abdominal: Soft. Bowel sounds are normal. She exhibits no distension. There is no tenderness.   Musculoskeletal: Normal range of motion.   Neurological: She is alert and oriented to person, place, and time. She has normal reflexes.   Skin: Skin is warm. She is not diaphoretic.   Psychiatric: She has a normal mood and affect. Her behavior is " normal. Judgment and thought content normal.   Nursing note and vitals reviewed.          ECG 12 Lead  Date/Time: 10/30/2018 7:17 PM  Performed by: ZABRINA GRANDA  Authorized by: ZABRINA GRANDA   Comparison: compared with previous ECG   Similar to previous ECG  Comparison to previous ECG: Similar to previous EKG  Rhythm: sinus rhythm and bundle branch block  Conduction: complete RBBB and LAFB  Clinical impression: abnormal ECG  Comments: Sinus arrhythmia with rapid bundle branch block and left anterior fascicular block           I personally reviewed her Zio patch tracings   Assessment:       Diagnosis Plan   1. Heart block  Case Request EP Lab: Pacemaker DC new          Plan:       The patient has evidence of high-grade heart  on her heart monitor.  She has some intermittent dizziness.  She has had evidence of high-grade heart block previously.  We discussed the risk and benefits of a pacemaker.  She was agreeable to proceeding we will get her on the schedule the next week or so.    As always, it has been a pleasure to participate in your patient's care.      Sincerely,         Zabrina Granda MD

## 2018-11-07 NOTE — DISCHARGE INSTRUCTIONS
"[facility]   [phone]    Post Pacemaker / Defibrillator Implant Instructions      1.  The dressing may be removed the next day.    2. If steri-strips were used, they should not be removed. Allow them to \"fall off\".      3. You may shower after the dressing is removed. Do not allow shower water to hit directly on incision.    4. No lotion/powder/ointment/cream on incision until it is healed.    5. Gently wash incision daily with soap and water and pat dry.    6. You may reapply a dressing if there is drainage, otherwise leave your incision open to air. If you reapply a dressing, please notify the pacemaker clinic.    7. No heavy lifting, pulling, or pushing.    8. Do not raise the affected arm over your head for a minimum of 1 month.    9. The pacemaker clinic will contact you (usually within 1 business day) to schedule a pacemaker/incision check. The check is usually done 7-10 days post-implant. If you have not heard from the pacemaker clinic within 3 days, please call the office.    10. Please call the office if you experience any of the following:   bleeding or drainage from your incision   swelling, redness, or opening of your incision   fever or chills   pain not relieved with medication   chest pain or difficulty breathing   lightheadness    11. For defibrillator patients only: If you receive a shock from your device, please call the office. If you receive 2 or more shocks within a 24 hour period OR if you receive 1 shock and feel poorly, you should be evaluated in the emergency room. Please DO NOT DRIVE if you have received a shock until your device has been checked.  "

## 2018-11-07 NOTE — PLAN OF CARE
Problem: Patient Care Overview  Goal: Plan of Care Review   11/07/18 6438   Coping/Psychosocial   Plan of Care Reviewed With patient   Plan of Care Review   Progress improving   OTHER   Outcome Summary admitted from recovery S/P ppm placement today. site cdi.given po tylenol for pain falls risk will cont to monitor

## 2018-11-08 ENCOUNTER — TELEPHONE (OUTPATIENT)
Dept: CARDIOLOGY | Facility: CLINIC | Age: 83
End: 2018-11-08

## 2018-11-08 VITALS
TEMPERATURE: 98.1 F | WEIGHT: 231 LBS | SYSTOLIC BLOOD PRESSURE: 128 MMHG | BODY MASS INDEX: 39.44 KG/M2 | DIASTOLIC BLOOD PRESSURE: 74 MMHG | HEIGHT: 64 IN | OXYGEN SATURATION: 94 % | RESPIRATION RATE: 20 BRPM | HEART RATE: 75 BPM

## 2018-11-08 PROCEDURE — A9270 NON-COVERED ITEM OR SERVICE: HCPCS | Performed by: INTERNAL MEDICINE

## 2018-11-08 PROCEDURE — 63710000001 VITAMIN B-12 500 MCG TABLET: Performed by: INTERNAL MEDICINE

## 2018-11-08 PROCEDURE — 93010 ELECTROCARDIOGRAM REPORT: CPT | Performed by: INTERNAL MEDICINE

## 2018-11-08 PROCEDURE — 63710000001 ACETAMINOPHEN 500 MG TABLET: Performed by: INTERNAL MEDICINE

## 2018-11-08 PROCEDURE — 93005 ELECTROCARDIOGRAM TRACING: CPT | Performed by: INTERNAL MEDICINE

## 2018-11-08 PROCEDURE — 63710000001 MESALAMINE 1.2 G TABLET DELAYED-RELEASE: Performed by: INTERNAL MEDICINE

## 2018-11-08 PROCEDURE — 63710000001 GABAPENTIN 300 MG CAPSULE: Performed by: INTERNAL MEDICINE

## 2018-11-08 PROCEDURE — 99024 POSTOP FOLLOW-UP VISIT: CPT | Performed by: NURSE PRACTITIONER

## 2018-11-08 PROCEDURE — G0378 HOSPITAL OBSERVATION PER HR: HCPCS

## 2018-11-08 RX ADMIN — MESALAMINE 4.8 G: 1.2 TABLET, DELAYED RELEASE ORAL at 08:50

## 2018-11-08 RX ADMIN — Medication 3 ML: at 08:52

## 2018-11-08 RX ADMIN — Medication 1000 MCG: at 08:50

## 2018-11-08 RX ADMIN — GABAPENTIN 300 MG: 300 CAPSULE ORAL at 08:50

## 2018-11-08 RX ADMIN — ACETAMINOPHEN 1000 MG: 500 TABLET, FILM COATED ORAL at 08:50

## 2018-11-08 NOTE — TELEPHONE ENCOUNTER
----- Message from SHAHID Farias sent at 11/8/2018  8:48 AM EST -----  Regarding: appt  Incision check in 1 week

## 2018-11-08 NOTE — PLAN OF CARE
Problem: Patient Care Overview  Goal: Plan of Care Review  Outcome: Ongoing (interventions implemented as appropriate)   11/08/18 0651   Coping/Psychosocial   Plan of Care Reviewed With patient   Plan of Care Review   Progress improving   OTHER   Outcome Summary Minimal c/o pain. Tylenol given for pain control. Incision is C/D/I. VSS. Potential discharge this am . Will continue to momitor      11/08/18 0651   Coping/Psychosocial   Plan of Care Reviewed With patient   Plan of Care Review   Progress improving   OTHER   Outcome Summary Minimal c/o pain. Tylenol given for pain control. Incision is C/D/I. VSS. Potential discharge this am . Will continue to momitor     Goal: Individualization and Mutuality  Outcome: Ongoing (interventions implemented as appropriate)    Goal: Discharge Needs Assessment  Outcome: Ongoing (interventions implemented as appropriate)    Goal: Interprofessional Rounds/Family Conf  Outcome: Ongoing (interventions implemented as appropriate)      Problem: Cardiac Rhythm Management Device (Adult)  Goal: Signs and Symptoms of Listed Potential Problems Will be Absent, Minimized or Managed (Cardiac Rhythm Management Device)  Outcome: Ongoing (interventions implemented as appropriate)      Problem: Fall Risk (Adult)  Goal: Identify Related Risk Factors and Signs and Symptoms  Outcome: Ongoing (interventions implemented as appropriate)    Goal: Absence of Fall  Outcome: Ongoing (interventions implemented as appropriate)      Problem: Skin Injury Risk (Adult)  Goal: Identify Related Risk Factors and Signs and Symptoms  Outcome: Ongoing (interventions implemented as appropriate)    Goal: Skin Health and Integrity  Outcome: Ongoing (interventions implemented as appropriate)

## 2018-11-08 NOTE — DISCHARGE SUMMARY
DISCHARGE NOTE    Patient Name: Brittany Villeda  Age/Sex: 83 y.o. female  : 1935  MRN: [id number]    Date of Discharge:  2018   Date of Admit: 2018  Encounter Provider: SHAHID Farias  Place of Service: [facility] CARDIOLOGY  Patient Care Team:  Mega Esparza MD as PCP - General (Family Medicine)  Micaela English MD as Consulting Physician (Gastroenterology)  Mary Grace Culp MD as Consulting Physician (Cardiology)  Devon Valadez MD as Consulting Physician (Hematology and Oncology)  Janice Castaneda MD (Inactive) as Referring Physician (Family Medicine)  Micaela English MD as Referring Physician (Gastroenterology)  Sanna Allen [facility] as Pharmacist (Pharmacy)  Jp Krause Jr., MD as Consulting Physician (Hematology and Oncology)    Subjective:     Discharge Diagnosis:    Heart block      Hospital Course:     83 year old patient of Dr. Culp and Dr. Granda's with symptomatic intermittent high degree AV block . She saw Dr. Granda and was recommended for PPM. She underwent PPM implantation yesterday by Dr. Granda. She has done well post procedure and is stable for dc home. Warfarin resumed for her PAF and will have incision check in 1 week. INR on Monday.    Vital Signs  Temp:  [97.5 °F (36.4 °C)-98.3 °F (36.8 °C)] 98.1 °F (36.7 °C)  Heart Rate:  [60-97] 75  Resp:  [16-20] 20  BP: (115-138)/(60-99) 128/74    Intake/Output Summary (Last 24 hours) at 18 1117  Last data filed at 18 1214   Gross per 24 hour   Intake              240 ml   Output                0 ml   Net              240 ml       Physical Exam:    General Appearance: No acute distress, well developed and well nourished.   Eyes: Conjunctiva and lids: No erythema, swelling, or discharge. Sclera non-icteric.   HENT: Atraumatic, normocephalic. External eyes, ears, and nose  normal.   Respiratory: No signs of respiratory distress. Respiration rhythm and depth normal.   Clear to auscultation. No rales, crackles, rhonchi, or wheezing auscultated.   Cardiovascular:  Jugular Venous Pressure: Normal  Heart Rate and Rhythm: Normal, Heart Sounds: Normal S1 and S2. No S3 or S4 noted.  Murmurs: No murmurs noted. No rubs, thrills, or gallops.   Arterial Pulses: Posterior tibialis and dorsalis pedis pulses normal.   Lower Extremities: No edema noted.  Gastrointestinal:  Abdomen soft, non-distended, non-tender.  Musculoskeletal: Normal movement of extremities  Skin: Warm and dry.   Psychiatric: Patient alert and oriented to person, place, and time. Speech and behavior appropriate. Normal mood and affect.    Labs:     Results from last 7 days  Lab Units 11/05/18  0802   SODIUM mmol/L 148*   POTASSIUM mmol/L 3.5   CHLORIDE mmol/L 107   CO2 mmol/L 28.6   BUN mg/dL 12   CREATININE mg/dL 0.77   GLUCOSE mg/dL 93   CALCIUM mg/dL 9.9           Results from last 7 days  Lab Units 11/05/18  0802   WBC 10*3/mm3 2.66*   HEMOGLOBIN g/dL 11.9   HEMATOCRIT % 37.1   PLATELETS 10*3/mm3 155       Results from last 7 days  Lab Units 11/07/18  0931 11/07/18  0929 11/05/18  0802   INR  1.8* 1.8* 2.35*                       Discharge Diet:    Dietary Orders     Start     Ordered    11/07/18 1206  Diet Regular  Diet Effective Now     Question:  Diet Texture / Consistency  Answer:  Regular    11/07/18 1205            Activity at Discharge:   Activity Instructions     Activity as tolerated                  Discharge Medications     Discharge Medications      Changes to Medications      Instructions Start Date   ferrous sulfate 325 (65 FE) MG tablet  What changed:  · when to take this  · additional instructions   325 mg, Oral, Daily With Breakfast         Continue These Medications      Instructions Start Date   calcium carbonate 600 MG tablet  Commonly known as:  OS-ALIDA   600 mg, Oral, Daily      furosemide 20 MG  tablet  Commonly known as:  LASIX   40 mg, Oral, Every Morning, 20mg in the PM       gabapentin 300 MG capsule  Commonly known as:  NEURONTIN   TAKE 1 CAPSULE TWICE DAILY      mesalamine 1.2 g EC tablet  Commonly known as:  LIALDA   4,800 mg, Oral, Daily      mometasone 0.1 % ointment  Commonly known as:  ELOCON   1 application, Topical, 2 Times Daily, As needed      omeprazole 20 MG capsule  Commonly known as:  priLOSEC   TAKE 1 CAPSULE EVERY DAY      PAIN RELIEVER PO   1 tablet, Oral, As Needed      vitamin B-12 1000 MCG tablet  Commonly known as:  CYANOCOBALAMIN   1,000 mcg, Oral, Daily      warfarin 5 MG tablet  Commonly known as:  COUMADIN   5 mg, Oral, Daily Warfarin             Discharge disposition: home    Follow-up Appointments  Additional Instructions for the Follow-ups that You Need to Schedule     Protime-INR    Nov 12, 2018      Is Patient on anti-coag:  Yes           Follow-up Information     Mega Esparza MD Follow up.    Specialty:  Family Medicine  Contact information:  [address]  [address]  [facility] [address] [phone]             [facility] Follow up in 1 week(s).    Why:  Incision check   Contact information:  [phone]               Future Appointments  Date Time Provider Department Center   11/15/2018 8:00 AM PACEART IN OFFICE, L[facility] CHERYLE TREV CD L[facility]KR None   12/12/2018 10:45 AM Mega Esparza MD [facility] PC EASPT None   12/21/2018 1:00 PM Mary Grace Culp MD [facility] CD LCGKR None   1/2/2019 10:00 AM LAB CHAIR 1 CBC KREE  LAB KRES LAG   1/9/2019 10:20 AM VITALS ONLY CBC KRE  LAB KRES LAG   1/9/2019 10:40 AM Devon Valadez MD [facility] CBC KRES  CBC Cheryle   1/10/2019 10:15 AM Carmelo Tao MD [facility] ANDERSO2 None   3/18/2019 10:00 AM Tereza Jackson DNP, APRN MGANASTASIYA CD LCGKR None   4/24/2019 9:40 AM Mary Grace Culp MD [facility] CD LCGEP None         SHAHID Farias  11/08/18  11:17 AM

## 2018-11-09 ENCOUNTER — TELEPHONE (OUTPATIENT)
Dept: FAMILY MEDICINE CLINIC | Facility: CLINIC | Age: 83
End: 2018-11-09

## 2018-11-09 NOTE — TELEPHONE ENCOUNTER
Patient called stating that she was due for her Hep shot on Wednesday but she had surgery on Wednesday for a pace maker. Patient is wondering when she should get her Hep A shot.

## 2018-11-12 ENCOUNTER — ANTICOAGULATION VISIT (OUTPATIENT)
Dept: PHARMACY | Facility: HOSPITAL | Age: 83
End: 2018-11-12

## 2018-11-12 DIAGNOSIS — I48.0 PAROXYSMAL ATRIAL FIBRILLATION (HCC): ICD-10-CM

## 2018-11-12 LAB
INR PPP: 2.5 (ref 0.91–1.09)
PROTHROMBIN TIME: 29.9 SECONDS (ref 10–13.8)

## 2018-11-12 PROCEDURE — 85610 PROTHROMBIN TIME: CPT

## 2018-11-12 PROCEDURE — 36416 COLLJ CAPILLARY BLOOD SPEC: CPT

## 2018-11-12 NOTE — PROGRESS NOTES
Anticoagulation Clinic Progress Note    Anticoagulation Summary  As of 2018    INR goal:   2.0-3.0   TTR:   53.2 % (1.2 mo)   INR used for dosin.5 (2018)   Warfarin maintenance plan:   2.5 mg on Thu, Sat; 5 mg all other days   Weekly warfarin total:   30 mg   No change documented:   Cherelle Montague   Plan last modified:   Janice Hart [facility] (2018)   Next INR check:   12/10/2018   Priority:   Maintenance   Target end date:   Indefinite    Indications    Paroxysmal atrial fibrillation (CMS/HCC) [I48.0]             Anticoagulation Episode Summary     INR check location:       Preferred lab:       Send INR reminders to:    [facility] [place]    Comments:         Anticoagulation Care Providers     Provider Role Specialty Phone number    Mary Grace Culp MD Referring Cardiology [phone]    Janice Hart [facility] Responsible Pharmacy [phone]          Drug interactions: has remained unchanged.  Diet: has remained unchanged.    Clinic Interview:  Patient Findings     Negatives:   Signs/symptoms of thrombosis, Signs/symptoms of bleeding,   Laboratory test error suspected, Change in health, Change in alcohol use,   Change in activity, Upcoming invasive procedure, Emergency department   visit, Upcoming dental procedure, Missed doses, Extra doses, Change in   medications, Change in diet/appetite, Hospital admission, Bruising, Other   complaints      Clinical Outcomes     Negatives:   Major bleeding event, Thromboembolic event,   Anticoagulation-related hospital admission, Anticoagulation-related ED   visit, Anticoagulation-related fatality        INR History:  Anticoagulation Monitoring 10/3/2018 10/24/2018 2018   INR - 3.5 2.5   INR Date - 10/24/2018 2018   INR Goal 2.0-3.0 2.0-3.0 2.0-3.0   Trend Same Same Same   Last Week Total 20 mg 30 mg 30 mg   Next Week Total 32.5 mg 27.5 mg 30 mg   Sun 5 mg 5 mg 5 mg   Mon 5 mg 5 mg 5 mg   Tue 5 mg 5 mg 5 mg   Wed 5 mg 2.5 mg  (10/24); Otherwise 5 mg 5 mg   Thu 5 mg (10/4); Otherwise 2.5 mg 2.5 mg 2.5 mg   Fri 5 mg 5 mg 5 mg   Sat 2.5 mg 2.5 mg 2.5 mg   Visit Report - - -   Some recent data might be hidden       Plan:  1. INR is therapeutic today- see above in Anticoagulation Summary.   Will instruct Brittany Villeda to continue their warfarin regimen- see above in Anticoagulation Summary.  2. Follow up in 4 weeks.  3. Patient declines warfarin refills.  4. Verbal and written information provided. Patient expresses understanding and has no further questions at this time.    Cherelle Montague

## 2018-11-13 RX ORDER — WARFARIN SODIUM 5 MG/1
TABLET ORAL
Qty: 90 TABLET | Refills: 1 | Status: SHIPPED | OUTPATIENT
Start: 2018-11-13 | End: 2019-10-25

## 2018-11-15 ENCOUNTER — TELEPHONE (OUTPATIENT)
Dept: CARDIOLOGY | Facility: CLINIC | Age: 83
End: 2018-11-15

## 2018-11-15 ENCOUNTER — CLINICAL SUPPORT NO REQUIREMENTS (OUTPATIENT)
Dept: CARDIOLOGY | Facility: CLINIC | Age: 83
End: 2018-11-15

## 2018-11-15 DIAGNOSIS — I44.2 THIRD DEGREE AV BLOCK (HCC): Primary | ICD-10-CM

## 2018-11-15 PROCEDURE — 93280 PM DEVICE PROGR EVAL DUAL: CPT | Performed by: INTERNAL MEDICINE

## 2018-11-15 NOTE — TELEPHONE ENCOUNTER
Pt of Dr. Culp.  You implanted PPM on 11/7/18.  Pacemaker checked today in office an PPM and incision are both great.  She is coming back in December for 1 month and Feb for 3 months check.  She is seeing Dr. Culp on 12/21/18.  She has no follow up with you and d/c summary does not indicate she needs follow up with you.  Please let me know if you would like to see her back and if you do when.

## 2018-11-21 ENCOUNTER — TELEPHONE (OUTPATIENT)
Dept: GASTROENTEROLOGY | Facility: CLINIC | Age: 83
End: 2018-11-21

## 2018-11-21 ENCOUNTER — HOSPITAL ENCOUNTER (EMERGENCY)
Facility: HOSPITAL | Age: 83
Discharge: HOME | End: 2018-11-21
Attending: EMERGENCY MEDICINE | Admitting: EMERGENCY MEDICINE

## 2018-11-21 VITALS
HEART RATE: 84 BPM | HEIGHT: 64 IN | SYSTOLIC BLOOD PRESSURE: 134 MMHG | DIASTOLIC BLOOD PRESSURE: 81 MMHG | OXYGEN SATURATION: 98 % | TEMPERATURE: 97.6 F | BODY MASS INDEX: 39.88 KG/M2 | WEIGHT: 233.6 LBS | RESPIRATION RATE: 16 BRPM

## 2018-11-21 DIAGNOSIS — B02.9 HERPES ZOSTER WITHOUT COMPLICATION: Primary | ICD-10-CM

## 2018-11-21 PROCEDURE — 99283 EMERGENCY DEPT VISIT LOW MDM: CPT

## 2018-11-21 RX ORDER — MESALAMINE 1.2 G/1
4800 TABLET, DELAYED RELEASE ORAL DAILY
Qty: 360 TABLET | Refills: 0 | Status: SHIPPED | OUTPATIENT
Start: 2018-11-21 | End: 2019-05-28 | Stop reason: SDUPTHER

## 2018-11-21 RX ORDER — VALACYCLOVIR HYDROCHLORIDE 1 G/1
1000 TABLET, FILM COATED ORAL 3 TIMES DAILY
Qty: 21 TABLET | Refills: 0 | Status: SHIPPED | OUTPATIENT
Start: 2018-11-21 | End: 2018-11-28

## 2018-11-21 NOTE — TELEPHONE ENCOUNTER
See o/v note of 11/5.  Lialda 1.2 gm - take 4 tabs by mouth daily, #360, R0 completed.      Next refill will need to be Apriso.

## 2018-11-21 NOTE — ED PROVIDER NOTES
Pt presents to the ED complaining of L shoulder pain and rash that began this morning. Per pt, rash is spreading to back and down arm. Pt denies any other complaints, chest pain, and SOA.     On exam,  GENERAL: not distressed  HENT: nares patent  EYES: no scleral icterus  CV: regular rhythm, regular rate  RESPIRATORY: normal effort  ABDOMEN: soft  MUSCULOSKELETAL: no deformity  NEURO: alert, MCKINLEY, FC  SKIN: warm, dry, vesicles to L shoulder and scapula     Vital signs and nursing notes reviewed.    I agree with midlevel plan to discharge pt with anti-viral medication for treatment of herpes zoster. Informed pt of possible persistence of pain, and pt directed to manage pain with lidoderm patches.     The BRENT and I have discussed this patient's history, physical exam, and treatment plan.  I have reviewed the documentation and personally had a face to face interaction with the patient. I affirm the documentation and agree with the treatment and plan.  The attached note describes my personal findings.    Documentation assistance provided by nuris Knott for Dr. Federico MD.  Information recorded by the scribe was done at my direction and has been verified and validated by me.       Jessenia Knott  11/21/18 1223       Jimy Caballero II, MD  11/21/18 3005

## 2018-11-21 NOTE — TELEPHONE ENCOUNTER
----- Message from Marielle Glez sent at 11/21/2018  2:31 PM EST -----  Regarding: mesalamine  Contact: [phone]  NEED AN NEW RX FOR MEDICATION       [employer] MAIL ORDER

## 2018-11-21 NOTE — DISCHARGE INSTRUCTIONS
Keep the areas covered.   Take the medications as prescribed.  Follow up with your PCP as directed.  Return to the ER as needed.

## 2018-11-21 NOTE — ED PROVIDER NOTES
EMERGENCY DEPARTMENT ENCOUNTER    Room Number:  15/15  Date seen:  11/21/2018  Time seen: 11:08 AM  PCP: Mega Esparza MD   History provided by- patient, family    HPI:  Chief complaint: skin rash  Context:Brittany Villeda is a 83 y.o. female states that this morning, she started having left shoulder pain. When she examined her left shoulder, she noticed that there was a painful and blister-like skin rash to her left shoulder, left neck, left chest, and behind her left ear. She denies fever, chills, intraoral lesions, any other chest pain, dyspnea, N/V/D, abd pain, and hx of shingles. She is s/p pacemaker placement on 11/7/18. Pt has no other complaints at this time.     Onset: gradual  Location: left shoulder, left neck, left chest, behind left ear  Radiation: none  Duration: noticed today  Timing: constant  Character: painful, blister-like  Aggravating Factors: none mentioned  Alleviating Factors: none mentioned  Severity: moderate        ALLERGIES  Codeine; Amitriptyline; Amoxicillin-pot clavulanate; Aspirin; Bactrim [sulfamethoxazole-trimethoprim]; Iodinated diagnostic agents; Latex; Naproxen; Nsaids; Soma compound with codeine [carisoprodol-aspirin-codeine]; and Sulfa antibiotics    PAST MEDICAL HISTORY  Active Ambulatory Problems     Diagnosis Date Noted   • Sciatica 05/12/2016   • Non-toxic multinodular goiter 05/12/2016   • Chronic midline low back pain without sciatica 05/12/2016   • Essential hypertension 05/12/2016   • Gastroesophageal reflux disease without esophagitis 05/12/2016   • Cataract 05/12/2016   • Pulmonary hypertension (CMS/[facility]) 06/30/2016   • Paroxysmal atrial fibrillation (CMS/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) 11/30/2017   • Lichen  sclerosus 08/23/2017   • Heart block 10/30/2018     Resolved Ambulatory Problems     Diagnosis Date Noted   • Abnormal weight loss 05/12/2016   • Tachycardia 05/12/2016   • Nausea 05/12/2016   • Hyperthyroidism 05/12/2016   • Fatigue 05/12/2016   • Dizziness 05/12/2016   • Diarrhea 05/12/2016   • Abnormal thyroid stimulating hormone (TSH) level 05/12/2016   • Abdominal pain 05/12/2016   • Abnormal EKG 06/30/2016   • Precordial pain 12/24/2017     Past Medical History:   Diagnosis Date   • Anemia    • Arrhythmia    • Arthritis    • Asthma    • Chest pain    • Colitis    • COPD (chronic obstructive pulmonary disease) (CMS/HCC)    • Diastolic dysfunction    • Essential hypertension 5/12/2016   • GERD (gastroesophageal reflux disease)    • Hypertension    • Hypertensive heart disease    • Hyperthyroidism    • Kidney stone    • Low back pain    • MGUS (monoclonal gammopathy of unknown significance)    • Nephrolithiasis    • Obesity    • Paroxysmal atrial fibrillation (CMS/HCC)    • Peptic ulceration    • Pulmonary hypertension (CMS/HCC)    • Sleep apnea    • Ventricular tachycardia (CMS/HCC)    • Vertigo        PAST SURGICAL HISTORY  Past Surgical History:   Procedure Laterality Date   • BACK SURGERY      lumbar fusion   • CHOLECYSTECTOMY     • COLONOSCOPY  06/16/2014    colitis, cryptitis,  tics, NBIH, TA w/low grade dysplasia   • HEMORRHOIDECTOMY     • HYSTERECTOMY     • KNEE ARTHROPLASTY     • MYOMECTOMY     • SHOULDER SURGERY     • SINUS SURGERY     • TONSILLECTOMY         FAMILY HISTORY  Family History   Problem Relation Age of Onset   • Diabetes Mother    • Breast cancer Sister    • Kidney cancer Sister    • Heart disease Sister    • Prostate cancer Brother    • Prostate cancer Brother    • Prostate cancer Brother        SOCIAL HISTORY  Social History     Socioeconomic History   • Marital status:      Spouse name: Not on file   • Number of children: 10   • Years of education: High School   • Highest  education level: Not on file   Social Needs   • Financial resource strain: Not on file   • Food insecurity - worry: Not on file   • Food insecurity - inability: Not on file   • Transportation needs - medical: Not on file   • Transportation needs - non-medical: Not on file   Occupational History   • Occupation: Caregiver     Employer: RETIRED     Comment: worked for [employer]   Tobacco Use   • Smoking status: Former Smoker     Packs/day: 1.50     Years: 10.00     Pack years: 15.00   • Smokeless tobacco: Never Used   • Tobacco comment: QUIT SMOKING 1965   Substance and Sexual Activity   • Alcohol use: No     Comment: Daily caffeine use   • Drug use: No   • Sexual activity: No   Other Topics Concern   • Not on file   Social History Narrative   • Not on file       REVIEW OF SYSTEMS  Review of Systems   Constitutional: Negative for chills and fever.   HENT: Negative.    Eyes: Negative.    Respiratory: Negative for shortness of breath.    Cardiovascular: Negative for chest pain.   Gastrointestinal: Negative for abdominal pain.   Genitourinary: Negative.    Musculoskeletal: Negative for joint swelling.   Skin: Positive for rash (painful and blister-like skin rash to left shoulder, left neck, left chest, and behind left ear).   Neurological: Negative for weakness and numbness.   Psychiatric/Behavioral: Negative.        PHYSICAL EXAM  ED Triage Vitals   Temp Heart Rate Resp BP SpO2   11/21/18 1117 11/21/18 1054 11/21/18 1054 11/21/18 1118 11/21/18 1054   97.8 °F (36.6 °C) 104 18 130/82 97 % WNL      Temp src Heart Rate Source Patient Position BP Location FiO2 (%)   11/21/18 1117 11/21/18 1054 -- -- --   Oral Monitor        Physical Exam   Constitutional: She is oriented to person, place, and time and well-developed, well-nourished, and in no distress.   HENT:   Head: Normocephalic and atraumatic.   Right Ear: External ear normal.   Left Ear: External ear normal.   Nose: Nose normal.   Eyes: Conjunctivae are  "normal.   Neck: Normal range of motion.   Cardiovascular: Normal rate and regular rhythm.   Pulmonary/Chest: Effort normal and breath sounds normal. No respiratory distress.   Musculoskeletal: Normal range of motion.   Neurological: She is alert and oriented to person, place, and time.   Skin: Skin is warm and dry. Rash (vesicular skin rash to the left posterior shoulder, left neck, left chest wall, and left posterior auricular area (skin rash does not cross the midline)) noted.   Psychiatric: Affect normal.   Nursing note and vitals reviewed.      MEDICATIONS GIVEN IN ER  Medications - No data to display      PROCEDURES  Procedures      PROGRESS AND CONSULTS    Progress Notes:       1114- Informed pt and family that based on exam findings, pt's sx are consistent with shingles skin rash. Educated them on the nature of shingles. Notified them of plan to prescribe pt with antiviral for shingles. Instructed pt to keep the skin rash covered. Also instructed pt and family to have pt f/u closely with PMD for recheck. RTER warnings given. Pt and family understand and agree with plan. Addressed all questions.    1155- Reviewed pt's history and workup with Dr. Caballero.  After a bedside evaluation; Dr Caballero agrees with the plan of care.      Disposition vitals:  /82   Pulse 104   Temp 97.8 °F (36.6 °C) (Oral)   Resp 18   Ht 162.6 cm (64\")   LMP  (LMP Unknown)   SpO2 97%   BMI 39.65 kg/m²       DIAGNOSIS  Final diagnoses:   Herpes zoster without complication       DISCHARGE    Patient discharged in stable condition.    Reviewed implications of diagnosis, meds, responsibility to follow up, warning signs and symptoms of possible worsening, potential complications and reasons to return to ER.    Patient/Family voiced understanding of above instructions.    Discussed plan for discharge, as there is no emergent indication for admission.  Pt/family is agreeable and understands need for follow up and repeat testing.  Pt is " aware that discharge does not mean that nothing is wrong but it indicates no emergency is present and they must continue care with follow-up as given below or physician of their choice.     FOLLOW-UP  Mega Esparza MD  [address]  [place] [address]  [phone]    In 1 week        DISCHARGE MEDICATIONS     Medication List      New Prescriptions    valACYclovir 1000 MG tablet  Commonly known as:  VALTREX  Take 1 tablet by mouth 3 (Three) Times a Day for 7 days.        Changed    ferrous sulfate 325 (65 FE) MG tablet  Take 1 tablet by mouth Daily With Breakfast.  What changed:    when to take this  additional instructions            Documentation assistance provided by nuris Smith for Katherine Torres PA-C.  Information recorded by the scribe was done at my direction and has been verified and validated by me.           Didi Smith  11/21/18 1214       Katherine Torres PA  11/22/18 2014

## 2018-11-26 ENCOUNTER — TELEPHONE (OUTPATIENT)
Dept: GASTROENTEROLOGY | Facility: CLINIC | Age: 83
End: 2018-11-26

## 2018-11-26 NOTE — TELEPHONE ENCOUNTER
----- Message from Maryann Villalta sent at 11/26/2018  9:54 AM EST -----  Contact: [phone]  PLEASE CALL PATIENT ABOUT NEW MEDICATION... SHE IS WANTING TO MAKE SURE SHE GETS IT.      THANKS

## 2018-11-26 NOTE — TELEPHONE ENCOUNTER
Called pt and pt was wanting to make sure that we sent in her lialda to the [facility].  Advised pt that yes it was sent on 11/21/2018.  Advised pt to let us know if she has any problems.  Pt verb understanding and reports in Jose A she will need to be switched to apriso.

## 2018-12-10 ENCOUNTER — ANTICOAGULATION VISIT (OUTPATIENT)
Dept: PHARMACY | Facility: HOSPITAL | Age: 83
End: 2018-12-10

## 2018-12-10 DIAGNOSIS — I48.0 PAROXYSMAL ATRIAL FIBRILLATION (HCC): ICD-10-CM

## 2018-12-10 LAB
INR PPP: 2.2 (ref 0.91–1.09)
PROTHROMBIN TIME: 26.6 SECONDS (ref 10–13.8)

## 2018-12-10 PROCEDURE — 36416 COLLJ CAPILLARY BLOOD SPEC: CPT

## 2018-12-10 PROCEDURE — 85610 PROTHROMBIN TIME: CPT

## 2018-12-10 NOTE — PROGRESS NOTES
Anticoagulation Clinic Progress Note    Anticoagulation Summary  As of 12/10/2018    INR goal:   2.0-3.0   TTR:   73.6 % (2.1 mo)   INR used for dosin.2 (12/10/2018)   Warfarin maintenance plan:   2.5 mg on Thu, Sat; 5 mg all other days   Weekly warfarin total:   30 mg   No change documented:   Fiorella Baires   Plan last modified:   [facility] (2018)   Next INR check:   2019   Priority:   Maintenance   Target end date:   Indefinite    Indications    Paroxysmal atrial fibrillation (CMS/HCC) [I48.0]             Anticoagulation Episode Summary     INR check location:       Preferred lab:       Send INR reminders to:    [facility] [place]    Comments:         Anticoagulation Care Providers     Provider Role Specialty Phone number    Mary Grace Culp MD Referring Cardiology [phone]    [facility] Responsible Pharmacy [phone]          Drug interactions: has remained unchanged.  Diet: has remained unchanged.    Clinic Interview:  Patient Findings     Negatives:   Signs/symptoms of thrombosis, Signs/symptoms of bleeding,   Laboratory test error suspected, Change in health, Change in alcohol use,   Change in activity, Upcoming invasive procedure, Emergency department   visit, Upcoming dental procedure, Missed doses, Extra doses, Change in   medications, Change in diet/appetite, Hospital admission, Bruising, Other   complaints      Clinical Outcomes     Negatives:   Major bleeding event, Thromboembolic event,   Anticoagulation-related hospital admission, Anticoagulation-related ED   visit, Anticoagulation-related fatality        INR History:  Anticoagulation Monitoring 10/24/2018 2018 12/10/2018   INR 3.5 2.5 2.2   INR Date 10/24/2018 2018 12/10/2018   INR Goal 2.0-3.0 2.0-3.0 2.0-3.0   Trend Same Same Same   Last Week Total 30 mg 30 mg 30 mg   Next Week Total 27.5 mg 30 mg 30 mg   Sun 5 mg 5 mg 5 mg   Mon 5 mg 5 mg 5 mg   Tue 5 mg 5 mg 5 mg   Wed 2.5  mg (10/24); Otherwise 5 mg 5 mg 5 mg   Thu 2.5 mg 2.5 mg 2.5 mg   Fri 5 mg 5 mg 5 mg   Sat 2.5 mg 2.5 mg 2.5 mg   Visit Report - - -   Some recent data might be hidden       Plan:  1. INR is therapeutic today- see above in Anticoagulation Summary.   Will instruct Brittany Villeda to continue their warfarin regimen- see above in Anticoagulation Summary.  2. Follow up in 4 weeks.  3. Patient declines warfarin refills.  4. Verbal and written information provided. Patient expresses understanding and has no further questions at this time.    Fiorella Baires

## 2018-12-11 ENCOUNTER — CLINICAL SUPPORT NO REQUIREMENTS (OUTPATIENT)
Dept: CARDIOLOGY | Facility: CLINIC | Age: 83
End: 2018-12-11

## 2018-12-11 DIAGNOSIS — I44.2 THIRD DEGREE AV BLOCK (HCC): Primary | ICD-10-CM

## 2018-12-17 ENCOUNTER — OFFICE VISIT (OUTPATIENT)
Dept: FAMILY MEDICINE CLINIC | Facility: CLINIC | Age: 83
End: 2018-12-17

## 2018-12-17 VITALS
HEIGHT: 64 IN | HEART RATE: 73 BPM | DIASTOLIC BLOOD PRESSURE: 80 MMHG | TEMPERATURE: 97.1 F | WEIGHT: 229.9 LBS | SYSTOLIC BLOOD PRESSURE: 124 MMHG | BODY MASS INDEX: 39.25 KG/M2 | OXYGEN SATURATION: 96 %

## 2018-12-17 DIAGNOSIS — I10 ESSENTIAL HYPERTENSION: ICD-10-CM

## 2018-12-17 DIAGNOSIS — B02.29 POSTHERPETIC NEURALGIA: Primary | ICD-10-CM

## 2018-12-17 DIAGNOSIS — I45.9 HEART BLOCK: ICD-10-CM

## 2018-12-17 PROCEDURE — 99214 OFFICE O/P EST MOD 30 MIN: CPT | Performed by: FAMILY MEDICINE

## 2018-12-17 RX ORDER — GABAPENTIN 300 MG/1
300 CAPSULE ORAL 4 TIMES DAILY
Qty: 360 CAPSULE | Refills: 1 | Status: SHIPPED | OUTPATIENT
Start: 2018-12-17 | End: 2019-07-22 | Stop reason: SDUPTHER

## 2018-12-17 NOTE — PROGRESS NOTES
"Subjective   Brittany Villeda is a 83 y.o. female.     Hypertension (pt had to get pacemacker 11/7/18 hospital follow up, pt is fasting)    History of Present Illness    Heart block.  Patient a permanent pacemaker placed over a month ago.  She feels fine otherwise with exception of postherpetic neuralgia.  She was diagnosed with shingles along the left neck and left shoulder area about a month ago.  She was placed on Valtrex.  Previously was on gabapentin for chronic back pain.  We had lowered her dose from 3 times a day down to 300 mg 2 times a day.  She continues that now.  She complains of radiculopathy from the left side of the neck down to the left hand.  When she uses the hand she feels pain in the left shoulder.  She has decreased range of motion left shoulder.  She has a minimal rash now at the left shoulder she states.  Mostly where the rashes were the pain is.    Hypertension follow up. Doing well with current medication which she is taking as directed. No known high or low blood pressure episodes. No cardiovascular or neurological symptoms. Today's BP: 124/80.      Last lipid panel:   Lab Results   Component Value Date    HDL 64 (H) 12/25/2017     Lab Results   Component Value Date    LDL 66 12/25/2017     Lab Results   Component Value Date    TRIG 55 12/25/2017       The following portions of the patient's history were reviewed and updated as appropriate: allergies, current medications, past family history, past medical history, past social history, past surgical history and problem list.      Review of Systems   Constitutional: Negative.    Respiratory: Negative.    Cardiovascular: Negative.    Musculoskeletal: Positive for back pain.   Skin: Positive for rash.   Neurological: Negative for numbness.       Objective   Blood pressure 124/80, pulse 73, temperature 97.1 °F (36.2 °C), temperature source Oral, height 162.6 cm (64.02\"), weight 104 kg (229 lb 14.4 oz), SpO2 96 %.  Physical Exam " "  Constitutional: She appears well-developed and well-nourished. No distress.   Neck: No thyromegaly present.   Cardiovascular: Normal rate, regular rhythm, normal heart sounds and intact distal pulses.   Pulmonary/Chest: Effort normal and breath sounds normal.   Musculoskeletal: She exhibits no edema.   She has decreased range of motion left shoulder.  There is weakness with abduction.  Pain with abduction against resistance.   Skin: Skin is warm and dry. Rash noted.   Postinflammatory changes from of vesicular rash left deltoid prominently.  Consistent with postherpetic neuralgia.   Psychiatric: She has a normal mood and affect. Her behavior is normal. Judgment and thought content normal.   Nursing note and vitals reviewed.      Assessment/Plan   Brittany was seen today for hypertension.    Diagnoses and all orders for this visit:    Postherpetic neuralgia    Essential hypertension    Heart block    Other orders  -     gabapentin (NEURONTIN) 300 MG capsule; Take 1 capsule by mouth 4 (Four) Times a Day.    Postherpetic neuralgia.  Also probable rotator cuff tendinitis.  She has an appointment coming up with her orthopedic doctor.  I recommend we increase the gabapentin from 300 mg twice a day up to eventually 300 mg 4 times a day.  She will decrease dose if she becomes sedated.  Daughter is aware also.    I pretension.  Good control.    Heart block.  Resolved since a permanent pacemaker.    History of sleep apnea.  Patient never tolerated the CPAP device.  She's not very excited about trying again.  She states she sleeps well \"like a log\".  She is scheduled to see the sleep doctor soon.      Current outpatient and discharge medications have been reconciled for the patient.  Reviewed by: Mega Esparza MD       "

## 2018-12-19 ENCOUNTER — OFFICE VISIT (OUTPATIENT)
Dept: SLEEP MEDICINE | Facility: HOSPITAL | Age: 83
End: 2018-12-19
Attending: INTERNAL MEDICINE

## 2018-12-19 VITALS
WEIGHT: 229 LBS | SYSTOLIC BLOOD PRESSURE: 149 MMHG | OXYGEN SATURATION: 97 % | HEIGHT: 64 IN | HEART RATE: 97 BPM | BODY MASS INDEX: 39.09 KG/M2 | DIASTOLIC BLOOD PRESSURE: 84 MMHG

## 2018-12-19 DIAGNOSIS — G47.33 OSA (OBSTRUCTIVE SLEEP APNEA): Primary | ICD-10-CM

## 2018-12-19 DIAGNOSIS — IMO0002 SLEEP-RELATED HYPOVENTILATION: ICD-10-CM

## 2018-12-19 PROCEDURE — G0463 HOSPITAL OUTPT CLINIC VISIT: HCPCS

## 2018-12-19 PROCEDURE — 99204 OFFICE O/P NEW MOD 45 MIN: CPT | Performed by: INTERNAL MEDICINE

## 2018-12-19 NOTE — PROGRESS NOTES
[facility] New Patient/Consultation       Reason for Consultation: HUGO    Patient Care Team:  Mega Esparza MD as PCP - General (Family Medicine)  Micaela English MD as Consulting Physician (Gastroenterology)  Mary Grace Culp MD as Consulting Physician (Cardiology)  Jp Krause Jr., MD as Consulting Physician (Hematology and Oncology)  Yasmeen Pichardo RAMIRO as Pharmacist  Carmelo Tao MD as Consulting Physician (Sleep Medicine)    Chief complaint:  HUGO    History of present illness:  Thank you for asking me to see your patient.  The patient is a 83 y.o. female who has a history of HGUO and atrial fibrillation.  The patient recently had PPM for symptomatic intermittent high degree AV block.    The patient was last seen in the sleep disorder Center in May 2013.  An overnight polysomnogram performed May 2, 2013, weight 270 pounds, demonstrated mild AHI abnormality at 9 events per hour for total sleep time.  During REM, moderate severity obstructive sleep apnea identified with AHI 25 events per hour.  Sleep-related hypoxemia noted with low O2 saturation of 74% and sleep-related hypoxemia present for 90 minutes.  The patient has never been able to tolerate CPAP.    Patient goes to bed between 8:30 and 9 PM and awakens at 6:45 AM.  She states she feels good upon awakening.  Her weight is down 40 pounds since her last sleep study.  She will have a dry mouth in the morning.  She will use the restroom once during the nighttime.  She denies hypersomnolence.    Review of Systems:  Recorded on the Sleep Questionnaire.  Unremarkable except for painful joints, poor appetite, history of irregular heartbeat, and swollen ankles toward the end of the day.    History:  Past Medical History:   Diagnosis Date   • Anemia    • Arrhythmia    • Arthritis    • Asthma    • Chest pain    • Colitis    • COPD (chronic obstructive pulmonary disease) (CMS/[facility])    • Diastolic dysfunction    • Essential hypertension 5/12/2016   •  GERD (gastroesophageal reflux disease)    • Hypertension    • Hypertensive heart disease    • Hyperthyroidism    • Kidney stone    • Low back pain    • MGUS (monoclonal gammopathy of unknown significance)    • Nephrolithiasis    • Obesity    • Paroxysmal atrial fibrillation (CMS/HCC)    • Peptic ulceration    • Pulmonary hypertension (CMS/HCC)    • Sleep apnea    • Ventricular tachycardia (CMS/HCC)     nonsustained   • Vertigo    ,   Past Surgical History:   Procedure Laterality Date   • BACK SURGERY      lumbar fusion   • CARDIAC ELECTROPHYSIOLOGY PROCEDURE N/A 2018    Procedure: Pacemaker DC new   [place];  Surgeon: Jesus Granda MD;  Location: [facility] INVASIVE LOCATION;  Service: Cardiology   • CHOLECYSTECTOMY     • COLONOSCOPY  2014    colitis, cryptitis,  tics, NBIH, TA w/low grade dysplasia   • HEMORRHOIDECTOMY     • HYSTERECTOMY     • KNEE ARTHROPLASTY     • MYOMECTOMY     • SHOULDER SURGERY     • SINUS SURGERY     • TONSILLECTOMY     ,   Family History   Problem Relation Age of Onset   • Diabetes Mother    • Breast cancer Sister    • Kidney cancer Sister    • Heart disease Sister    • Prostate cancer Brother    • Prostate cancer Brother    • Prostate cancer Brother     and   Social History     Tobacco Use   • Smoking status: Former Smoker     Packs/day: 1.50     Years: 10.00     Pack years: 15.00     Start date:      Last attempt to quit: 1965     Years since quittin.0   • Smokeless tobacco: Never Used   • Tobacco comment: QUIT SMOKING    Substance Use Topics   • Alcohol use: No     Comment: Daily caffeine use - one cup of coffee   • Drug use: No       Allergies:  Codeine; Amitriptyline; Amoxicillin-pot clavulanate; Aspirin; Bactrim [sulfamethoxazole-trimethoprim]; Iodinated diagnostic agents; Latex; Naproxen; Nsaids; Soma compound with codeine [carisoprodol-aspirin-codeine]; and Sulfa antibiotics     Medication Review: I reviewed her medication list    Vital Signs:   "  Vitals:    12/19/18 0900   BP: 149/84   Pulse: 97   SpO2: 97%   Weight: 104 kg (229 lb)   Height: 162.6 cm (64\")      Body mass index is 39.31 kg/m².  Neck Circumference: 14.5 inches      Physical Exam:  Recorded on the [facility] Physical Exam Form and is unremarkable except for:  A normal sized tongue and uvula and moderate narrowing of the posterior pharyngeal opening and class II MP airway     Results Review:  I reviewed the results of her old sleep study       Impression:   History of mild obstructive sleep apnea for total sleep time, moderate severity during REM and significant sleep-related hypoxemia.  The patient has lost 40 pounds of weight since her last sleep study.    Plan:  Good sleep hygiene measures should be maintained.  Further weight loss would be beneficial in this patient who is obese.    Pathophysiology of HUGO described to the patient.  Cardiovascular complications of untreated HUGO also reviewed.  I also reviewed the relationship of abnormal heart rhythm and HUGO.      After reviewing all with the patient and her son, I would recommend obtaining an overnight diagnostic polysomnogram.  She is agreeable and this will be scheduled.     Thank you for requesting me to assist in this patient's care.    Carmelo Tao MD  Sleep Medicine  12/22/18  11:13 AM          "

## 2018-12-21 ENCOUNTER — OFFICE VISIT (OUTPATIENT)
Dept: CARDIOLOGY | Facility: CLINIC | Age: 83
End: 2018-12-21

## 2018-12-21 VITALS
DIASTOLIC BLOOD PRESSURE: 72 MMHG | BODY MASS INDEX: 39.27 KG/M2 | WEIGHT: 230 LBS | HEIGHT: 64 IN | HEART RATE: 85 BPM | SYSTOLIC BLOOD PRESSURE: 120 MMHG

## 2018-12-21 DIAGNOSIS — I51.89 DIASTOLIC DYSFUNCTION: ICD-10-CM

## 2018-12-21 DIAGNOSIS — I48.0 PAROXYSMAL ATRIAL FIBRILLATION (HCC): ICD-10-CM

## 2018-12-21 DIAGNOSIS — I45.9 HEART BLOCK: ICD-10-CM

## 2018-12-21 DIAGNOSIS — G47.33 OSA (OBSTRUCTIVE SLEEP APNEA): ICD-10-CM

## 2018-12-21 DIAGNOSIS — I10 ESSENTIAL HYPERTENSION: ICD-10-CM

## 2018-12-21 DIAGNOSIS — R00.1 BRADYCARDIA: Primary | ICD-10-CM

## 2018-12-21 DIAGNOSIS — I27.20 PULMONARY HYPERTENSION (HCC): ICD-10-CM

## 2018-12-21 PROCEDURE — 99214 OFFICE O/P EST MOD 30 MIN: CPT | Performed by: INTERNAL MEDICINE

## 2018-12-21 PROCEDURE — 93000 ELECTROCARDIOGRAM COMPLETE: CPT | Performed by: INTERNAL MEDICINE

## 2018-12-21 NOTE — PROGRESS NOTES
Date of Office Visit: 2018  Encounter Provider: Mary Grace Culp MD  Place of Service: [facility] CARDIOLOGY  Patient Name: Brittany Villeda  :1935    Chief complaint  follow-up of paroxysmal atrial fibrillation, hypertension with hypertensive heart disease conduction disease and chronic warfarin therapy     History of Present Illness  The patient is a pleasant, 83-year-old female with a history of hypertension, obstructive sleep apnea, obesity, immobility, pulmonary hypertension, history of nonsustained ventricular tachycardia, and obstructive sleep apnea.  She has a history of diastolic dysfunction.  In , she had nonsustained ventricular tachycardia.  A stress perfusion study was negative for ischemia.  An echocardiogram revealed normal left ventricular size and function with moderate left ventricular hypertrophy.  In 2012, she had chronic obstructive pulmonary disease exacerbation as well as chest pain that resolved with treatment of her chronic obstructive pulmonary disease.  In 2013, she was seen for persistent dizziness in the setting of new-onset paroxysmal atrial fibrillation.  She was admitted to the hospital and not felt to have had a stroke.  Her symptoms actually resolved with ear manipulation and were felt to be more vestibular in nature.  She also developed paroxysmal atrial fibrillation with early bradycardia which resolved with treatment of her sleep apnea and AV flaco blocker therapy.  She was placed on warfarin.  She also had a heme-positive stool with anemia and was seen by the gastrointestinal doctors and EGD and colonoscopy were performed. The EGD revealed mild erythema but otherwise stable.  She initiated therapy with a BiPAP.  In 2017 presented to the emergency room with chest pain.  She ruled out for myocardial infarction.  A stress perfusion study that was negative for ischemia with normal systolic function.  A Ziopatch revealed  sinus rhythm with episodes of supra-ventricular tachycardia that was symptomatic.  In September 2018 EKG showed pauses in the setting of bifascicular block.  A 24-hour Holter revealed 65 pauses 3 seconds longest induration.  Sinus rhythm was present throughout most of the study.  With complaints of palpitations PACs were noted.  Current 22.2% of the monitor time there is also 14 episodes of atrial tachycardia lasting 4 beats.  There were also episodes of 2-1 AV block and questionable third degree.  An echocardiogram revealed normal systolic function grade 1 diastolic dysfunction, borderline right ventricular enlargement, mild to moderate tricuspid valve regurgitation with an RV systolic pressure 48 mmHg.  The ascending aorta was mildly dilated at 3.8 cm.  I recommended a pacemaker placement but she deferred in which she discuss this further with her daughter and is here today regarding this.  By September 2018 she was noted to have more symptomatic bradycardia and after much discussion she underwent pacemaker placement in November 2018.      ..............................    Past Medical History:   Diagnosis Date   • Anemia    • Arrhythmia    • Arthritis    • Asthma    • Chest pain    • Colitis    • COPD (chronic obstructive pulmonary disease) (CMS/HCC)    • Diastolic dysfunction    • Essential hypertension 5/12/2016   • GERD (gastroesophageal reflux disease)    • Hypertension    • Hypertensive heart disease    • Hyperthyroidism    • Kidney stone    • Low back pain    • MGUS (monoclonal gammopathy of unknown significance)    • Nephrolithiasis    • Obesity    • Paroxysmal atrial fibrillation (CMS/HCC)    • Peptic ulceration    • Pulmonary hypertension (CMS/HCC)    • Sleep apnea    • Ventricular tachycardia (CMS/HCC)     nonsustained   • Vertigo      Past Surgical History:   Procedure Laterality Date   • BACK SURGERY      lumbar fusion   • CARDIAC ELECTROPHYSIOLOGY PROCEDURE N/A 11/7/2018    Procedure: Pacemaker DC  [place];  Surgeon: Jesus Granda MD;  Location: [facility] INVASIVE LOCATION;  Service: Cardiology   • CHOLECYSTECTOMY     • COLONOSCOPY  06/16/2014    colitis, cryptitis,  tics, NBIH, TA w/low grade dysplasia   • HEMORRHOIDECTOMY     • HYSTERECTOMY     • KNEE ARTHROPLASTY     • MYOMECTOMY     • SHOULDER SURGERY     • SINUS SURGERY     • TONSILLECTOMY       Outpatient Medications Prior to Visit   Medication Sig Dispense Refill   • Acetaminophen (PAIN RELIEVER PO) Take 1 tablet by mouth As Needed.     • calcium carbonate (OS-ALIDA) 600 MG tablet Take 600 mg by mouth Daily.     • furosemide (LASIX) 20 MG tablet Take 2 tablets by mouth Every Morning. 20mg in the  tablet 1   • gabapentin (NEURONTIN) 300 MG capsule Take 1 capsule by mouth 4 (Four) Times a Day. (Patient taking differently: Take 300 mg by mouth 3 (Three) Times a Day.) 360 capsule 1   • mesalamine (LIALDA) 1.2 g EC tablet Take 4 tablets by mouth Daily. 360 tablet 0   • mometasone (ELOCON) 0.1 % ointment Apply 1 application topically to the appropriate area as directed 2 (Two) Times a Day. As needed     • omeprazole (priLOSEC) 20 MG capsule TAKE 1 CAPSULE EVERY DAY 90 capsule 0   • vitamin B-12 (CYANOCOBALAMIN) 1000 MCG tablet Take 1,000 mcg by mouth Daily.     • warfarin (COUMADIN) 5 MG tablet TAKE 1 TABLET EVERY DAY 90 tablet 1   • ferrous sulfate 325 (65 FE) MG tablet Take 1 tablet by mouth Daily With Breakfast. (Patient taking differently: Take 325 mg by mouth. Take 1 po every other week)       No facility-administered medications prior to visit.        Allergies as of 12/21/2018 - Reviewed 12/21/2018   Allergen Reaction Noted   • Codeine Hallucinations 11/30/2017   • Amitriptyline Rash 05/12/2016   • Amoxicillin-pot clavulanate Rash 05/12/2016   • Aspirin Unknown (See Comments) 05/12/2016   • Bactrim [sulfamethoxazole-trimethoprim] Rash 05/12/2016   • Iodinated diagnostic agents Rash 05/12/2016   • Latex Rash 09/12/2016   • Naproxen  Rash 2016   • Nsaids Unknown (See Comments) 2016   • Soma compound with codeine [carisoprodol-aspirin-codeine] Rash 2016   • Sulfa antibiotics Rash 2016     Social History     Socioeconomic History   • Marital status:      Spouse name: Not on file   • Number of children: 10   • Years of education: High School   • Highest education level: Not on file   Social Needs   • Financial resource strain: Not on file   • Food insecurity - worry: Not on file   • Food insecurity - inability: Not on file   • Transportation needs - medical: Not on file   • Transportation needs - non-medical: Not on file   Occupational History   • Occupation: Caregiver     Employer: RETIRED     Comment: worked for [employer]   Tobacco Use   • Smoking status: Former Smoker     Packs/day: 1.50     Years: 10.00     Pack years: 15.00     Start date:      Last attempt to quit:      Years since quittin.0   • Smokeless tobacco: Never Used   • Tobacco comment: QUIT SMOKING    Substance and Sexual Activity   • Alcohol use: No     Comment: Daily caffeine use - one cup of coffee   • Drug use: No   • Sexual activity: No   Other Topics Concern   • Not on file   Social History Narrative   • Not on file     Family History   Problem Relation Age of Onset   • Diabetes Mother    • Breast cancer Sister    • Kidney cancer Sister    • Heart disease Sister    • Prostate cancer Brother    • Prostate cancer Brother    • Prostate cancer Brother      Review of Systems   Constitution: Negative for fever, malaise/fatigue, weight gain and weight loss.   HENT: Negative for ear pain, hearing loss, nosebleeds and sore throat.    Eyes: Negative for double vision, pain, vision loss in left eye and vision loss in right eye.   Cardiovascular:        See history of present illness.   Respiratory: Negative for cough, shortness of breath, sleep disturbances due to breathing, snoring and wheezing.    Endocrine: Negative for  "cold intolerance, heat intolerance and polyuria.   Skin: Negative for itching, poor wound healing and rash.   Musculoskeletal: Negative for joint pain, joint swelling and myalgias.   Gastrointestinal: Negative for abdominal pain, diarrhea, hematochezia, nausea and vomiting.   Genitourinary: Negative for hematuria and hesitancy.   Neurological: Negative for numbness, paresthesias and seizures.   Psychiatric/Behavioral: Negative for depression. The patient is not nervous/anxious.         Objective:     Vitals:    12/21/18 1339   BP: 120/72   BP Location: Right arm   Pulse: 85   Weight: 104 kg (230 lb)   Height: 162.6 cm (64\")     Body mass index is 39.48 kg/m².    Physical Exam   Constitutional: She is oriented to person, place, and time. She appears well-developed and well-nourished.   Obese   HENT:   Head: Normocephalic.   Nose: Nose normal.   Mouth/Throat: Oropharynx is clear and moist.   Eyes: Conjunctivae and EOM are normal. Pupils are equal, round, and reactive to light. Right eye exhibits no discharge. No scleral icterus.   Neck: Normal range of motion. Neck supple. No JVD present. No thyromegaly present.   Cardiovascular: Normal rate, regular rhythm, normal heart sounds and intact distal pulses. Exam reveals no gallop and no friction rub.   No murmur heard.  Pulses:       Carotid pulses are 2+ on the right side, and 2+ on the left side.       Radial pulses are 2+ on the right side, and 2+ on the left side.        Femoral pulses are 0 on the right side, and 0 on the left side.       Popliteal pulses are 0 on the right side, and 0 on the left side.        Dorsalis pedis pulses are 2+ on the right side, and 2+ on the left side.        Posterior tibial pulses are 2+ on the right side, and 2+ on the left side.   Pulmonary/Chest: Effort normal and breath sounds normal. No respiratory distress. She has no wheezes. She has no rales.   Abdominal: Soft. Bowel sounds are normal. She exhibits no distension. There is no " hepatosplenomegaly. There is no tenderness. There is no rebound.   Musculoskeletal: Normal range of motion. She exhibits no edema or tenderness.   Neurological: She is alert and oriented to person, place, and time.   Skin: Skin is warm and dry. No rash noted. No erythema.   Psychiatric: She has a normal mood and affect. Her behavior is normal. Judgment and thought content normal.   Vitals reviewed.    Lab Review:     ECG 12 Lead  Date/Time: 12/21/2018 1:55 PM  Performed by: Mary Grace Culp MD  Authorized by: Mary Grace Culp MD   Comparison: compared with previous ECG   Comparison to previous ECG: Omentum ventricular pacing is noted.  Rhythm: sinus rhythm  Rhythm comments: Sinus rhythm with sinus arrhythmia and intermittent ventricular pacing            Assessment:       Diagnosis Plan   1. Bradycardia  ECG 12 Lead   2. Pulmonary hypertension (CMS/HCC)     3. Paroxysmal atrial fibrillation (CMS/HCC)     4. Essential hypertension     5. Heart block     6. Diastolic dysfunction     7. HUGO (obstructive sleep apnea)       Plan:       1.  Distant history of paroxysmal atrial fibrillation and paroxysmal supraventricular tachycardia with sick sinus syndrome with intermittent type and complete heart block.  status post permanent pacemaker placement.  Continue routine device checks.  2.  Supraventricular tachycardia. As above  3.  Hypertension, her blood pressure initially taken with the risks cuff is 180/112 however subsequently taken with a thigh cuff was 120/72.  4.  History of diastolic dysfunction.   5.  Probable early ascending aortic aneurysm.  Noted by echocardiography in September.  I had recommended CT angiography of her chest to further assess this after obtaining clearance from hematology.  However patient deferred this at that time until after pacemaker was placed.  Unfortunately she does not recall any of this.  However her daughter Gema was contacted by phone and does recall this.  At this point will proceed with  CT angiography of the chest I asked her to increase her hydration and hold Lasix for 3 days day of and the day following CT scan.  6.  Pulmonary hypertension, RVSP 48mmHg. The patient saw Dr. Tao and refuses further testing for sleep apnea  7   Hx of chest pain.  Negative PET scan in December 2017.  No recurrence  8.  Untreated HUGO, intolerant of BiPAP       Your medication list           Accurate as of 12/21/18 11:59 PM. If you have any questions, ask your nurse or doctor.               CHANGE how you take these medications      Instructions Last Dose Given Next Dose Due   gabapentin 300 MG capsule  Commonly known as:  NEURONTIN  What changed:  when to take this      Take 1 capsule by mouth 4 (Four) Times a Day.          CONTINUE taking these medications      Instructions Last Dose Given Next Dose Due   calcium carbonate 600 MG tablet  Commonly known as:  OS-ALIDA      Take 600 mg by mouth Daily.       furosemide 20 MG tablet  Commonly known as:  LASIX      Take 2 tablets by mouth Every Morning. 20mg in the PM       mesalamine 1.2 g EC tablet  Commonly known as:  LIALDA      Take 4 tablets by mouth Daily.       mometasone 0.1 % ointment  Commonly known as:  ELOCON      Apply 1 application topically to the appropriate area as directed 2 (Two) Times a Day. As needed       omeprazole 20 MG capsule  Commonly known as:  priLOSEC      TAKE 1 CAPSULE EVERY DAY       PAIN RELIEVER PO      Take 1 tablet by mouth As Needed.       vitamin B-12 1000 MCG tablet  Commonly known as:  CYANOCOBALAMIN      Take 1,000 mcg by mouth Daily.       warfarin 5 MG tablet  Commonly known as:  COUMADIN      TAKE 1 TABLET EVERY DAY          STOP taking these medications    ferrous sulfate 325 (65 FE) MG tablet  Stopped by:  Mary Grace Culp MD               Patient is no longer taking -.  I corrected the med list to reflect this.  I did not stop these medications.    Dictated utilizing Dragon dictation

## 2018-12-22 PROBLEM — IMO0002 SLEEP-RELATED HYPOVENTILATION: Status: ACTIVE | Noted: 2018-12-22

## 2018-12-29 PROBLEM — R00.1 BRADYCARDIA: Status: ACTIVE | Noted: 2018-12-29

## 2019-01-02 ENCOUNTER — LAB (OUTPATIENT)
Dept: LAB | Facility: HOSPITAL | Age: 84
End: 2019-01-02

## 2019-01-02 DIAGNOSIS — K51.30 ULCERATIVE RECTOSIGMOIDITIS WITHOUT COMPLICATION (HCC): ICD-10-CM

## 2019-01-02 DIAGNOSIS — D47.2 MGUS (MONOCLONAL GAMMOPATHY OF UNKNOWN SIGNIFICANCE): ICD-10-CM

## 2019-01-02 DIAGNOSIS — D70.8 OTHER NEUTROPENIA (HCC): ICD-10-CM

## 2019-01-02 LAB
ALBUMIN SERPL-MCNC: 4 G/DL (ref 3.5–5.2)
ALBUMIN/GLOB SERPL: 1.2 G/DL (ref 1.1–2.4)
ALP SERPL-CCNC: 65 U/L (ref 38–116)
ALT SERPL W P-5'-P-CCNC: 5 U/L (ref 0–33)
ANION GAP SERPL CALCULATED.3IONS-SCNC: 9.7 MMOL/L
AST SERPL-CCNC: 14 U/L (ref 0–32)
BASOPHILS # BLD AUTO: 0.02 10*3/MM3 (ref 0–0.1)
BASOPHILS NFR BLD AUTO: 0.6 % (ref 0–1.1)
BILIRUB SERPL-MCNC: 1.2 MG/DL (ref 0.1–1.2)
BUN BLD-MCNC: 21 MG/DL (ref 6–20)
BUN/CREAT SERPL: 26.9 (ref 7.3–30)
CALCIUM SPEC-SCNC: 10.2 MG/DL (ref 8.5–10.2)
CHLORIDE SERPL-SCNC: 104 MMOL/L (ref 98–107)
CO2 SERPL-SCNC: 29.3 MMOL/L (ref 22–29)
CREAT BLD-MCNC: 0.78 MG/DL (ref 0.6–1.1)
DEPRECATED RDW RBC AUTO: 53.8 FL (ref 37–49)
EOSINOPHIL # BLD AUTO: 0 10*3/MM3 (ref 0–0.36)
EOSINOPHIL NFR BLD AUTO: 0 % (ref 1–5)
ERYTHROCYTE [DISTWIDTH] IN BLOOD BY AUTOMATED COUNT: 14.2 % (ref 11.7–14.5)
FERRITIN SERPL-MCNC: 80.5 NG/ML
GFR SERPL CREATININE-BSD FRML MDRD: 85 ML/MIN/1.73
GLOBULIN UR ELPH-MCNC: 3.4 GM/DL (ref 1.8–3.5)
GLUCOSE BLD-MCNC: 101 MG/DL (ref 74–124)
HCT VFR BLD AUTO: 37.3 % (ref 34–45)
HGB BLD-MCNC: 12 G/DL (ref 11.5–14.9)
IMM GRANULOCYTES # BLD AUTO: 0.01 10*3/MM3 (ref 0–0.03)
IMM GRANULOCYTES NFR BLD AUTO: 0.3 % (ref 0–0.5)
IRON 24H UR-MRATE: 86 MCG/DL (ref 37–145)
IRON SATN MFR SERPL: 27 % (ref 14–48)
LYMPHOCYTES # BLD AUTO: 1.63 10*3/MM3 (ref 1–3.5)
LYMPHOCYTES NFR BLD AUTO: 47.8 % (ref 20–49)
MCH RBC QN AUTO: 32.7 PG (ref 27–33)
MCHC RBC AUTO-ENTMCNC: 32.2 G/DL (ref 32–35)
MCV RBC AUTO: 101.6 FL (ref 83–97)
MONOCYTES # BLD AUTO: 0.39 10*3/MM3 (ref 0.25–0.8)
MONOCYTES NFR BLD AUTO: 11.4 % (ref 4–12)
NEUTROPHILS # BLD AUTO: 1.36 10*3/MM3 (ref 1.5–7)
NEUTROPHILS NFR BLD AUTO: 39.9 % (ref 39–75)
NRBC BLD AUTO-RTO: 0 /100 WBC (ref 0–0)
PLATELET # BLD AUTO: 145 10*3/MM3 (ref 150–375)
PMV BLD AUTO: 8.1 FL (ref 8.9–12.1)
POTASSIUM BLD-SCNC: 3.9 MMOL/L (ref 3.5–4.7)
PROT SERPL-MCNC: 7.4 G/DL (ref 6.3–8)
RBC # BLD AUTO: 3.67 10*6/MM3 (ref 3.9–5)
SODIUM BLD-SCNC: 143 MMOL/L (ref 134–145)
TIBC SERPL-MCNC: 322 MCG/DL (ref 249–505)
TRANSFERRIN SERPL-MCNC: 230 MG/DL (ref 200–360)
WBC NRBC COR # BLD: 3.41 10*3/MM3 (ref 4–10)

## 2019-01-02 PROCEDURE — 85025 COMPLETE CBC W/AUTO DIFF WBC: CPT

## 2019-01-02 PROCEDURE — 83540 ASSAY OF IRON: CPT

## 2019-01-02 PROCEDURE — 80053 COMPREHEN METABOLIC PANEL: CPT

## 2019-01-02 PROCEDURE — 84466 ASSAY OF TRANSFERRIN: CPT

## 2019-01-02 PROCEDURE — 36415 COLL VENOUS BLD VENIPUNCTURE: CPT | Performed by: INTERNAL MEDICINE

## 2019-01-02 PROCEDURE — 82728 ASSAY OF FERRITIN: CPT

## 2019-01-03 LAB
ALBUMIN SERPL-MCNC: 3.7 G/DL (ref 2.9–4.4)
ALBUMIN/GLOB SERPL: 1.1 {RATIO} (ref 0.7–1.7)
ALPHA1 GLOB FLD ELPH-MCNC: 0.2 G/DL (ref 0–0.4)
ALPHA2 GLOB SERPL ELPH-MCNC: 0.6 G/DL (ref 0.4–1)
B-GLOBULIN SERPL ELPH-MCNC: 1.4 G/DL (ref 0.7–1.3)
GAMMA GLOB SERPL ELPH-MCNC: 1.3 G/DL (ref 0.4–1.8)
GLOBULIN SER CALC-MCNC: 3.6 G/DL (ref 2.2–3.9)
IGA SERPL-MCNC: 780 MG/DL (ref 64–422)
IGG SERPL-MCNC: 1460 MG/DL (ref 700–1600)
IGM SERPL-MCNC: 58 MG/DL (ref 26–217)
INTERPRETATION SERPL IEP-IMP: ABNORMAL
KAPPA LC SERPL-MCNC: 38.7 MG/L (ref 3.3–19.4)
KAPPA LC/LAMBDA SER: 0.9 {RATIO} (ref 0.26–1.65)
LAMBDA LC FREE SERPL-MCNC: 42.8 MG/L (ref 5.7–26.3)
Lab: ABNORMAL
M-SPIKE: 0.4 G/DL
PROT SERPL-MCNC: 7.3 G/DL (ref 6–8.5)

## 2019-01-09 ENCOUNTER — APPOINTMENT (OUTPATIENT)
Dept: LAB | Facility: HOSPITAL | Age: 84
End: 2019-01-09

## 2019-01-09 ENCOUNTER — OFFICE VISIT (OUTPATIENT)
Dept: ONCOLOGY | Facility: CLINIC | Age: 84
End: 2019-01-09

## 2019-01-09 ENCOUNTER — ANTICOAGULATION VISIT (OUTPATIENT)
Dept: PHARMACY | Facility: HOSPITAL | Age: 84
End: 2019-01-09

## 2019-01-09 VITALS
RESPIRATION RATE: 18 BRPM | BODY MASS INDEX: 39.21 KG/M2 | OXYGEN SATURATION: 94 % | HEART RATE: 66 BPM | DIASTOLIC BLOOD PRESSURE: 77 MMHG | TEMPERATURE: 97.9 F | WEIGHT: 229.7 LBS | HEIGHT: 64 IN | SYSTOLIC BLOOD PRESSURE: 145 MMHG

## 2019-01-09 DIAGNOSIS — I48.0 PAROXYSMAL ATRIAL FIBRILLATION (HCC): ICD-10-CM

## 2019-01-09 DIAGNOSIS — D70.8 OTHER NEUTROPENIA (HCC): ICD-10-CM

## 2019-01-09 DIAGNOSIS — D47.2 MGUS (MONOCLONAL GAMMOPATHY OF UNKNOWN SIGNIFICANCE): Primary | ICD-10-CM

## 2019-01-09 LAB
INR PPP: 2.2 (ref 0.91–1.09)
PROTHROMBIN TIME: 26.9 SECONDS (ref 10–13.8)

## 2019-01-09 PROCEDURE — 99214 OFFICE O/P EST MOD 30 MIN: CPT | Performed by: INTERNAL MEDICINE

## 2019-01-09 PROCEDURE — 36416 COLLJ CAPILLARY BLOOD SPEC: CPT

## 2019-01-09 PROCEDURE — 85610 PROTHROMBIN TIME: CPT

## 2019-01-09 NOTE — PROGRESS NOTES
Anticoagulation Clinic Progress Note    Anticoagulation Summary  As of 2019    INR goal:   2.0-3.0   TTR:   82.0 % (3.1 mo)   INR used for dosin.2 (2019)   Warfarin maintenance plan:   2.5 mg on Thu, Sat; 5 mg all other days   Weekly warfarin total:   30 mg   No change documented:   Fiorella Baires   Plan last modified:   [facility] (2018)   Next INR check:   2019   Priority:   Maintenance   Target end date:   Indefinite    Indications    Paroxysmal atrial fibrillation (CMS/HCC) [I48.0]             Anticoagulation Episode Summary     INR check location:       Preferred lab:       Send INR reminders to:    [facility] [place]    Comments:         Anticoagulation Care Providers     Provider Role Specialty Phone number    Mary Grace Culp MD Referring Cardiology [phone]    [facility] [phone]          Clinic Interview:  Patient Findings     Negatives:   Signs/symptoms of thrombosis, Signs/symptoms of bleeding,   Laboratory test error suspected, Change in health, Change in alcohol use,   Change in activity, Upcoming invasive procedure, Emergency department   visit, Upcoming dental procedure, Missed doses, Extra doses, Change in   medications, Change in diet/appetite, Hospital admission, Bruising, Other   complaints      Clinical Outcomes     Negatives:   Major bleeding event, Thromboembolic event,   Anticoagulation-related hospital admission, Anticoagulation-related ED   visit, Anticoagulation-related fatality        INR History:  Anticoagulation Monitoring 2018 12/10/2018 2019   INR 2.5 2.2 2.2   INR Date 2018 12/10/2018 2019   INR Goal 2.0-3.0 2.0-3.0 2.0-3.0   Trend Same Same Same   Last Week Total 30 mg 30 mg 30 mg   Next Week Total 30 mg 30 mg 30 mg   Sun 5 mg 5 mg 5 mg   Mon 5 mg 5 mg 5 mg   Tue 5 mg 5 mg 5 mg   Wed 5 mg 5 mg 5 mg   Thu 2.5 mg 2.5 mg 2.5 mg   Fri 5 mg 5 mg 5 mg   Sat 2.5 mg 2.5 mg 2.5 mg    Visit Report - - -   Some recent data might be hidden       Plan:  1. INR is therapeutic today- see above in Anticoagulation Summary.   Will instruct Brittany Villeda to continue their warfarin regimen- see above in Anticoagulation Summary.  2. Follow up in 4 weeks.  3. Patient declines warfarin refills.  4. Verbal and written information provided. Patient expresses understanding and has no further questions at this time.    Fiorella Baires

## 2019-01-09 NOTE — PROGRESS NOTES
Subjective     REASON FOR FOLLOW UP:   1. CHRONIC LEUKOPENIA/ Neutropenia  2. IgA Lambda MGUS  3. Ulcerative Colitis  4. Positive ALEC    HISTORY OF PRESENT ILLNESS:  The patient is a 83 y.o. year old female who is here for follow up of the above noted  issuse.  She had actually been evaluated by Dr. Choi in our practice in 2014 for leukopenia.  At that time Dr. Choi had sent a peripheral blood flow cytometry which did not show any abnormal populations of white cells.  Ms. Villeda reports that she is actually feeling well in general.  She's not had any recurring infections.  Her white count remains low but stable.  Her platelets and hemoglobin are unremarkable.    She had a repeat serum protein electrophoresis performed last week that showed stable IgA monoclonal spike and adequate iron stores.  Her white count remains low but stable and overall she appears to be doing very well.  History of Present Illness       Current Outpatient Medications on File Prior to Visit   Medication Sig Dispense Refill   • Acetaminophen (PAIN RELIEVER PO) Take 1 tablet by mouth As Needed.     • calcium carbonate (OS-ALIDA) 600 MG tablet Take 600 mg by mouth Daily.     • furosemide (LASIX) 20 MG tablet Take 2 tablets by mouth Every Morning. 20mg in the  tablet 1   • gabapentin (NEURONTIN) 300 MG capsule Take 1 capsule by mouth 4 (Four) Times a Day. (Patient taking differently: Take 300 mg by mouth 3 (Three) Times a Day.) 360 capsule 1   • mesalamine (LIALDA) 1.2 g EC tablet Take 4 tablets by mouth Daily. 360 tablet 0   • mometasone (ELOCON) 0.1 % ointment Apply 1 application topically to the appropriate area as directed 2 (Two) Times a Day. As needed     • omeprazole (priLOSEC) 20 MG capsule TAKE 1 CAPSULE EVERY DAY 90 capsule 0   • vitamin B-12 (CYANOCOBALAMIN) 1000 MCG tablet Take 1,000 mcg by mouth Daily.     • warfarin (COUMADIN) 5 MG tablet TAKE 1 TABLET EVERY DAY 90 tablet 1     No current facility-administered medications  "on file prior to visit.         ALLERGIES:    Allergies   Allergen Reactions   • Codeine Hallucinations   • Amitriptyline Rash   • Amoxicillin-Pot Clavulanate Rash   • Aspirin Unknown (See Comments)     Patient doesn't know why   • Bactrim [Sulfamethoxazole-Trimethoprim] Rash   • Iodinated Diagnostic Agents Rash   • Latex Rash   • Naproxen Rash   • Nsaids Unknown (See Comments)     unkknown   • Soma Compound With Codeine [Carisoprodol-Aspirin-Codeine] Rash   • Sulfa Antibiotics Rash          Review of Systems   Constitutional: Negative for activity change, appetite change, fatigue, fever and unexpected weight change.   HENT: Negative for hearing loss, nosebleeds, trouble swallowing and voice change.    Eyes: Negative for visual disturbance.   Respiratory: Negative for cough, shortness of breath and wheezing.    Cardiovascular: Negative for chest pain and palpitations.   Gastrointestinal: Negative for abdominal pain, nausea and vomiting.   Genitourinary: Negative for difficulty urinating, frequency, hematuria and urgency.   Musculoskeletal: Positive for arthralgias and gait problem. Negative for back pain and neck pain.   Skin: Negative for rash.   Neurological: Negative for dizziness, seizures, syncope and headaches.        Balance issues.   Hematological: Negative for adenopathy. Does not bruise/bleed easily.   Psychiatric/Behavioral: Negative for behavioral problems. The patient is not nervous/anxious.       Objective     Vitals:    01/09/19 1023   BP: 145/77   Pulse: 66   Resp: 18   Temp: 97.9 °F (36.6 °C)   TempSrc: Oral   SpO2: 94%   Weight: 104 kg (229 lb 11.2 oz)   Height: 162.6 cm (64.02\")   PainSc: 0-No pain     Current Status 1/9/2019   ECOG score 1       Physical Exam   Constitutional: She is oriented to person, place, and time. She appears well-developed and well-nourished. No distress.   HENT:   Head: Normocephalic.   Eyes: Conjunctivae and EOM are normal. Pupils are equal, round, and reactive to light. " No scleral icterus.   Neck: Normal range of motion. Neck supple. No JVD present. No thyromegaly present.   Cardiovascular: Normal rate and regular rhythm. Exam reveals no gallop and no friction rub.   Murmur heard.  Pulmonary/Chest: Effort normal and breath sounds normal. She has no wheezes. She has no rales.   Abdominal: Soft. She exhibits no distension and no mass. There is no tenderness.   Musculoskeletal: Normal range of motion. She exhibits no edema or deformity.   Lymphadenopathy:     She has no cervical adenopathy.   Neurological: She is alert and oriented to person, place, and time. She has normal reflexes. No cranial nerve deficit.   Ambulates with a cane.   Skin: Skin is warm and dry. No rash noted. No erythema.   Psychiatric: She has a normal mood and affect. Her behavior is normal. Judgment normal.         RECENT LABS:  Hematology WBC   Date Value Ref Range Status   01/02/2019 3.41 (L) 4.00 - 10.00 10*3/mm3 Final     RBC   Date Value Ref Range Status   01/02/2019 3.67 (L) 3.90 - 5.00 10*6/mm3 Final     Hemoglobin   Date Value Ref Range Status   01/02/2019 12.0 11.5 - 14.9 g/dL Final     Hematocrit   Date Value Ref Range Status   01/02/2019 37.3 34.0 - 45.0 % Final     Platelets   Date Value Ref Range Status   01/02/2019 145 (L) 150 - 375 10*3/mm3 Final        Lab Results   Component Value Date    GLUCOSE 101 01/02/2019    BUN 21 (H) 01/02/2019    CREATININE 0.78 01/02/2019    EGFRIFNONA 51 (L) 01/02/2018    EGFRIFAFRI 85 01/02/2019    BCR 26.9 01/02/2019    K 3.9 01/02/2019    CO2 29.3 (H) 01/02/2019    CALCIUM 10.2 01/02/2019    PROTENTOTREF 7.3 01/02/2019    ALBUMIN 4.00 01/02/2019    ALBUMIN 3.7 01/02/2019    LABIL2 1.1 01/02/2019    AST 14 01/02/2019    ALT 5 01/02/2019       Lab Results   Component Value Date    IRON 86 01/02/2019    TIBC 322 01/02/2019    FERRITIN 80.50 01/02/2019       SPEP/DMITRIY 1/2/2019  IgG 700 - 1600 mg/dL 1460    IgA 64 - 422 mg/dL 780 Abnormally high     IgM 26 - 217 mg/dL 58     Total Protein 6.0 - 8.5 g/dL 7.3    Albumin 2.9 - 4.4 g/dL 3.7    Alpha-1-Globulin 0.0 - 0.4 g/dL 0.2    Alpha-2-Globulin 0.4 - 1.0 g/dL 0.6    Beta Globulin 0.7 - 1.3 g/dL 1.4 Abnormally high     Gamma Globulin 0.4 - 1.8 g/dL 1.3    M-Kip Not Observed g/dL 0.4 Abnormally high     Globulin 2.2 - 3.9 g/dL 3.6    A/G Ratio 0.7 - 1.7 1.1    Immunofixation Reflex, Serum  Comment    Comment: Immunofixation shows IgA monoclonal protein with lambda light chain   specificity.       BONE MARROW BIOPSY 9/13/2017  No evidence malignancy or plasma cell excess. Absent stainable iron.    Assessment/Plan   1.  Chronic leukopenia/neutropenia without any recurring infections.  As noted above she been evaluated in our office in 2014 and at that time peripheral blood flow cytometry was normal.  I suspect this may be a combination of benign constitutional leukopenia, which is common among the  population with some contribution or worsening of leukopenia due to her Lialda for ulcerative colitis or possible autoimmune leukopenia.  Her counts remain low but stable. Bone marrow exam on 9/13/2017 did not show any evidence of marrow pathology other than absent stainable iron.  2.  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  Her IgA level and lambda light chains have been stable over the past 6 months.  With her bone marrow showing no excess of plasma cells we will just plan on observing this for now with repeat serum protein studies every 6 months.   3.  Ulcerative colitis.  She continues to follow-up with her gastroenterologist Dr. English  4.  Positive ALEC on previous lab work.  It is possible she could also have some degree of autoimmune neutropenia.  5.  Absent iron on the bone marrow examination.  We suspect patient is becoming iron deficient due to chronic GI blood loss from ulcerative colitis and chronic Coumadin therapy.    Plan    1.  We discussed the results of the lab studies with the patient and   reassured her that monoclonal gammopathy and leukopenia are stable.  2.  She'll be scheduled for return visit in 6 months and will undergo repeat labs one week prior to that visit including CBC, serum chemistries, iron studies, and serum protein electrophoresis with immunofixation.

## 2019-01-21 ENCOUNTER — TRANSCRIBE ORDERS (OUTPATIENT)
Dept: ADMINISTRATIVE | Facility: HOSPITAL | Age: 84
End: 2019-01-21

## 2019-01-21 DIAGNOSIS — Z12.39 BREAST SCREENING: Primary | ICD-10-CM

## 2019-01-28 ENCOUNTER — OFFICE VISIT (OUTPATIENT)
Dept: FAMILY MEDICINE CLINIC | Facility: CLINIC | Age: 84
End: 2019-01-28

## 2019-01-28 VITALS
WEIGHT: 228.3 LBS | TEMPERATURE: 97 F | HEIGHT: 64 IN | HEART RATE: 72 BPM | DIASTOLIC BLOOD PRESSURE: 67 MMHG | OXYGEN SATURATION: 96 % | SYSTOLIC BLOOD PRESSURE: 130 MMHG | BODY MASS INDEX: 38.98 KG/M2

## 2019-01-28 DIAGNOSIS — M19.019 SHOULDER ARTHRITIS: Primary | ICD-10-CM

## 2019-01-28 DIAGNOSIS — M75.112 INCOMPLETE TEAR OF LEFT ROTATOR CUFF: Primary | ICD-10-CM

## 2019-01-28 PROCEDURE — 99213 OFFICE O/P EST LOW 20 MIN: CPT | Performed by: FAMILY MEDICINE

## 2019-01-28 NOTE — PROGRESS NOTES
"Subjective   Brittany Villeda is a 83 y.o. female.     Chief Complaint   Patient presents with   • Shoulder Pain     left shoulder, she had been having this pain for a while every since her pacemaker was placed in Nov 7 2018 the pain is worse        History of Present Illness    Left shoulder pain.  Since November.  On and off again prior to that.  She describes decreased range of motion of the left shoulder.  Pain especially at nighttime.  She has seen a orthopedic doctor through [place] [id number].  Reportedly has a rotator cuff tear.  They have tried some cortisone injections without relief.  She has not gone to physical therapy.  They're considering surgery.  We have placed a second opinion consultation through [facility] [place] [facility].    She had severe shingles on the left side of the neck and left shoulder in November of this year.  She was treated with an antiviral.  She continues on gabapentin 3 times a day maximum.  It is helping the neuropathic, neuralgia pain.  She describes the pain also going from her neck to her shoulder.  Currently taking gabapentin twice a day.  Also taking Tylenol 500 mg 2 tablets twice a day      The following portions of the patient's history were reviewed and updated as appropriate: allergies, current medications, past family history, past medical history, past social history, past surgical history and problem list.          Review of Systems   Constitutional: Negative for fever.   Respiratory: Negative.    Cardiovascular: Negative.    Musculoskeletal: Negative for joint swelling.   Skin: Negative for rash.   Neurological: Positive for weakness. Negative for numbness.   Psychiatric/Behavioral: Negative for dysphoric mood.       Objective   Blood pressure 130/67, pulse 72, temperature 97 °F (36.1 °C), temperature source Oral, height 162.6 cm (64.02\"), weight 104 kg (228 lb 4.8 oz), SpO2 96 %.  Physical Exam   Constitutional: No distress.   Cardiovascular: Normal rate.   Pulmonary/Chest: " Effort normal.   Musculoskeletal:   Left shoulder.  Limited range of motion.  She cannot abduct past 45°.  Painful.  Also strength appears decreased at the left deltoid.  Examination is limited by pain.  She has good pulses.  She has no rash.       Assessment/Plan   Brittany was seen today for shoulder pain.    Diagnoses and all orders for this visit:    Incomplete tear of left rotator cuff  -     Ambulatory Referral to Physical Therapy Evaluate and treat        Rotator cuff tear.  Left-sided.  Probable frozen shoulder syndrome.  We are waiting the second opinion with orthopedics.  In the meantime I strongly recommend physical therapy, evaluation and treat.  She likely will benefit from even minimal range of motion and strength training.  Also if she needs surgery, the physical therapy will help preparing her for that.  I did offer hydrocodone for nighttime, patient declined.  She does not like the way it makes her feel.  She cannot take NSAIDs because of previous rash and also previous GI problems.  She also cannot take NSAIDs because of her warfarin use and paroxysmal atrial fibrillation.    Postherpetic neuralgia.  Left neck left shoulder left chest wall.  It is probable that some of her shoulder pain is related to the neuralgia.  However she has definite rotator cuff issues that exacerbate her pain.  Continue gabapentin 300 mg twice a day.

## 2019-02-04 ENCOUNTER — ANTICOAGULATION VISIT (OUTPATIENT)
Dept: PHARMACY | Facility: HOSPITAL | Age: 84
End: 2019-02-04

## 2019-02-04 DIAGNOSIS — I48.0 PAROXYSMAL ATRIAL FIBRILLATION (HCC): ICD-10-CM

## 2019-02-04 LAB
INR PPP: 2.1 (ref 0.91–1.09)
INR PPP: 2.1 (ref 0.91–1.09)
PROTHROMBIN TIME: 25.3 SECONDS (ref 10–13.8)
PROTHROMBIN TIME: 25.3 SECONDS (ref 10–13.8)

## 2019-02-04 PROCEDURE — 85610 PROTHROMBIN TIME: CPT

## 2019-02-04 PROCEDURE — 36416 COLLJ CAPILLARY BLOOD SPEC: CPT

## 2019-02-04 NOTE — PROGRESS NOTES
Anticoagulation Clinic Progress Note    Anticoagulation Summary  As of 2019    INR goal:   2.0-3.0   TTR:   85.9 % (4 mo)   INR used for dosin.1 (2019)   Warfarin maintenance plan:   2.5 mg on Thu, Sat; 5 mg all other days   Weekly warfarin total:   30 mg   No change documented:   Fiorella Baires   Plan last modified:   [facility] (2018)   Next INR check:   3/4/2019   Priority:   Maintenance   Target end date:   Indefinite    Indications    Paroxysmal atrial fibrillation (CMS/HCC) [I48.0]             Anticoagulation Episode Summary     INR check location:       Preferred lab:       Send INR reminders to:    [facility]    Comments:         Anticoagulation Care Providers     Provider Role Specialty Phone number    Mary Grace Culp MD Referring Cardiology [phone]    [facility] Responsible Pharmacy [phone]          Clinic Interview:  Patient Findings     Negatives:   Signs/symptoms of thrombosis, Signs/symptoms of bleeding,   Laboratory test error suspected, Change in health, Change in alcohol use,   Change in activity, Upcoming invasive procedure, Emergency department   visit, Upcoming dental procedure, Missed doses, Extra doses, Change in   medications, Change in diet/appetite, Hospital admission, Bruising, Other   complaints      Clinical Outcomes     Negatives:   Major bleeding event, Thromboembolic event,   Anticoagulation-related hospital admission, Anticoagulation-related ED   visit, Anticoagulation-related fatality        INR History:  Anticoagulation Monitoring 12/10/2018 2019 2019   INR 2.2 2.2 2.1   INR Date 12/10/2018 2019 2019   INR Goal 2.0-3.0 2.0-3.0 2.0-3.0   Trend Same Same Same   Last Week Total 30 mg 30 mg 30 mg   Next Week Total 30 mg 30 mg 30 mg   Sun 5 mg 5 mg 5 mg   Mon 5 mg 5 mg 5 mg   Tue 5 mg 5 mg 5 mg   Wed 5 mg 5 mg 5 mg   Thu 2.5 mg 2.5 mg 2.5 mg   Fri 5 mg 5 mg 5 mg   Sat 2.5 mg 2.5 mg 2.5 mg    Visit Report - - -   Some recent data might be hidden       Plan:  1. INR is therapeutic today- see above in Anticoagulation Summary.   Will instruct Brittany Villeda to continue their warfarin regimen- see above in Anticoagulation Summary.  2. Follow up in 4 weeks.  3. Patient declines warfarin refills.  4. Verbal and written information provided. Patient expresses understanding and has no further questions at this time.    Fiorella Baires

## 2019-02-06 ENCOUNTER — APPOINTMENT (OUTPATIENT)
Dept: GENERAL RADIOLOGY | Facility: HOSPITAL | Age: 84
End: 2019-02-06

## 2019-02-06 ENCOUNTER — HOSPITAL ENCOUNTER (EMERGENCY)
Facility: HOSPITAL | Age: 84
Discharge: HOME | End: 2019-02-06
Attending: EMERGENCY MEDICINE | Admitting: EMERGENCY MEDICINE

## 2019-02-06 VITALS
BODY MASS INDEX: 37.56 KG/M2 | DIASTOLIC BLOOD PRESSURE: 62 MMHG | WEIGHT: 220 LBS | RESPIRATION RATE: 16 BRPM | OXYGEN SATURATION: 98 % | HEIGHT: 64 IN | HEART RATE: 73 BPM | TEMPERATURE: 98.2 F | SYSTOLIC BLOOD PRESSURE: 105 MMHG

## 2019-02-06 DIAGNOSIS — B02.29 POST HERPETIC NEURALGIA: ICD-10-CM

## 2019-02-06 DIAGNOSIS — M75.32 CALCIFIC TENDONITIS OF LEFT SHOULDER: Primary | ICD-10-CM

## 2019-02-06 DIAGNOSIS — M75.02 ADHESIVE CAPSULITIS OF LEFT SHOULDER: ICD-10-CM

## 2019-02-06 PROCEDURE — 99283 EMERGENCY DEPT VISIT LOW MDM: CPT

## 2019-02-06 PROCEDURE — 73030 X-RAY EXAM OF SHOULDER: CPT

## 2019-02-06 RX ORDER — METHYLPREDNISOLONE 4 MG/1
TABLET ORAL
Qty: 1 EACH | Refills: 0 | Status: SHIPPED | OUTPATIENT
Start: 2019-02-06 | End: 2019-02-13

## 2019-02-06 RX ORDER — ONDANSETRON 4 MG/1
4 TABLET, ORALLY DISINTEGRATING ORAL ONCE
Status: COMPLETED | OUTPATIENT
Start: 2019-02-06 | End: 2019-02-06

## 2019-02-06 RX ORDER — HYDROCODONE BITARTRATE AND ACETAMINOPHEN 7.5; 325 MG/1; MG/1
1 TABLET ORAL ONCE
Status: COMPLETED | OUTPATIENT
Start: 2019-02-06 | End: 2019-02-06

## 2019-02-06 RX ORDER — HYDROCODONE BITARTRATE AND ACETAMINOPHEN 5; 325 MG/1; MG/1
1 TABLET ORAL EVERY 8 HOURS PRN
Qty: 15 TABLET | Refills: 0 | Status: SHIPPED | OUTPATIENT
Start: 2019-02-06 | End: 2019-02-13

## 2019-02-06 RX ADMIN — ONDANSETRON 4 MG: 4 TABLET, ORALLY DISINTEGRATING ORAL at 09:55

## 2019-02-06 RX ADMIN — HYDROCODONE BITARTRATE AND ACETAMINOPHEN 1 TABLET: 7.5; 325 TABLET ORAL at 09:54

## 2019-02-06 NOTE — ED PROVIDER NOTES
EMERGENCY DEPARTMENT ENCOUNTER    CHIEF COMPLAINT  Chief Complaint: left shoulder pain  History given by: patient  History limited by: nothing  Room Number: 08/08  PMD: Mega Esparza MD      HPI:  Pt is a 83 y.o. female who presents complaining of left shoulder pain that began in early November. She states that she has had this pain since they placed her pacemaker. When she moves her left arm, she has shooting pain down the left side of her neck and into her shoulder. However, she also has pain with light palpation to the posterior left shoulder.  Pt states that she has seen her PCP regarding this shoulder pain and he has referred her to PT and an orthopedic physician. She states that she has an apt for both of these in ten days. Pt states that she had shingles on the left side of her neck and shoulder after they placed the pacemaker. Her shingles were located in the same areas that she has had the pain since then.     Duration: three months  Onset: gradual  Timing: constant  Location: left shoulder  Radiation: none  Quality: pain  Intensity/Severity: moderate  Progression: worsening  Associated Symptoms: none  Aggravating Factors: movement of left arm  Alleviating Factors: none  Previous Episodes: none  Treatment before arrival: Pt saw her PCP twice and is scheduled to see ortho and PT.     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   • Leukopenia 09/12/2016   • MGUS (monoclonal gammopathy of unknown  significance) 09/16/2016   • 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   • Lichen sclerosus 08/23/2017   • Heart block 10/30/2018   • Sleep-related hypoxia 12/22/2018   • Bradycardia 12/29/2018   • Shoulder arthritis 01/28/2019     Resolved Ambulatory Problems     Diagnosis Date Noted   • Abnormal weight loss 05/12/2016   • Tachycardia 05/12/2016   • Nausea 05/12/2016   • Hyperthyroidism 05/12/2016   • Fatigue 05/12/2016   • Dizziness 05/12/2016   • Diarrhea 05/12/2016   • Abnormal thyroid stimulating hormone (TSH) level 05/12/2016   • Abdominal pain 05/12/2016   • Abnormal EKG 06/30/2016   • Precordial pain 12/24/2017     Past Medical History:   Diagnosis Date   • Anemia    • Arrhythmia    • Arthritis    • Asthma    • Chest pain    • Colitis    • COPD (chronic obstructive pulmonary disease) (CMS/HCC)    • Diastolic dysfunction    • Essential hypertension 5/12/2016   • GERD (gastroesophageal reflux disease)    • Hypertension    • Hypertensive heart disease    • Hyperthyroidism    • Kidney stone    • Low back pain    • MGUS (monoclonal gammopathy of unknown significance)    • Nephrolithiasis    • Obesity    • Paroxysmal atrial fibrillation (CMS/HCC)    • Peptic ulceration    • Pulmonary hypertension (CMS/HCC)    • Sleep apnea    • Ventricular tachycardia (CMS/HCC)    • Vertigo        PAST SURGICAL HISTORY  Past Surgical History:   Procedure Laterality Date   • BACK SURGERY      lumbar fusion   • CARDIAC ELECTROPHYSIOLOGY PROCEDURE N/A 11/7/2018    Procedure: Pacemaker DC new   [place];  Surgeon: Jesus Granda MD;  Location: [facility] INVASIVE LOCATION;  Service: Cardiology   • CHOLECYSTECTOMY     • COLONOSCOPY  06/16/2014    colitis, cryptitis,  tics, NBIH, TA w/low grade dysplasia   • HEMORRHOIDECTOMY     • HYSTERECTOMY     • KNEE ARTHROPLASTY     • MYOMECTOMY     • SHOULDER SURGERY     • SINUS SURGERY     • TONSILLECTOMY         FAMILY HISTORY  Family History   Problem Relation  Age of Onset   • Diabetes Mother    • Breast cancer Sister    • Kidney cancer Sister    • Heart disease Sister    • Prostate cancer Brother    • Prostate cancer Brother    • Prostate cancer Brother        SOCIAL HISTORY  Social History     Socioeconomic History   • Marital status:      Spouse name: Not on file   • Number of children: 10   • Years of education: High School   • Highest education level: Not on file   Social Needs   • Financial resource strain: Not on file   • Food insecurity - worry: Not on file   • Food insecurity - inability: Not on file   • Transportation needs - medical: Not on file   • Transportation needs - non-medical: Not on file   Occupational History   • Occupation: Caregiver     Employer: RETIRED     Comment: worked for [employer]   Tobacco Use   • Smoking status: Former Smoker     Packs/day: 1.50     Years: 10.00     Pack years: 15.00     Start date:      Last attempt to quit:      Years since quittin.1   • Smokeless tobacco: Never Used   • Tobacco comment: QUIT SMOKING    Substance and Sexual Activity   • Alcohol use: No     Comment: Daily caffeine use - one cup of coffee   • Drug use: No   • Sexual activity: No   Other Topics Concern   • Not on file   Social History Narrative   • Not on file       ALLERGIES  Codeine; Amitriptyline; Amoxicillin-pot clavulanate; Aspirin; Bactrim [sulfamethoxazole-trimethoprim]; Iodinated diagnostic agents; Latex; Naproxen; Nsaids; Soma compound with codeine [carisoprodol-aspirin-codeine]; and Sulfa antibiotics    REVIEW OF SYSTEMS  Review of Systems   Constitutional: Negative for fever.   HENT: Negative for sore throat.    Eyes: Negative.    Respiratory: Negative for cough and shortness of breath.    Cardiovascular: Negative for chest pain.   Gastrointestinal: Negative for abdominal pain, diarrhea and vomiting.   Genitourinary: Negative for dysuria.   Musculoskeletal: Positive for arthralgias (left shoulder). Negative  for neck pain.   Skin: Negative for rash.   Neurological: Negative for weakness, numbness and headaches.   Hematological: Negative.    Psychiatric/Behavioral: Negative.    All other systems reviewed and are negative.      PHYSICAL EXAM  ED Triage Vitals   Temp Heart Rate Resp BP SpO2   02/06/19 0913 02/06/19 0913 02/06/19 0913 02/06/19 0926 02/06/19 0913   98.2 °F (36.8 °C) 74 15 119/55 98 %      Temp src Heart Rate Source Patient Position BP Location FiO2 (%)   02/06/19 0913 -- 02/06/19 0926 -- --   Tympanic  Sitting         Physical Exam   Constitutional: She is oriented to person, place, and time. No distress.   HENT:   Head: Normocephalic and atraumatic.   Eyes: EOM are normal. Pupils are equal, round, and reactive to light.   Neck: Normal range of motion. Neck supple.   Cardiovascular: Normal rate, regular rhythm and normal heart sounds.   Pulmonary/Chest: Effort normal and breath sounds normal. No respiratory distress.   Abdominal: Soft. There is no tenderness. There is no rebound and no guarding.   Musculoskeletal: Normal range of motion. She exhibits no edema.   Pain with external rotation, abduction, and extension of her left shoulder.    Neurological: She is alert and oriented to person, place, and time. She has normal sensation and normal strength.   Skin: Skin is warm and dry. No rash noted.   Pt has healing scars on posterior left shoulder from recent shingles outbreak.   Psychiatric: Mood and affect normal.   Nursing note and vitals reviewed.      RADIOLOGY  XR Shoulder 2+ View Left   Final Result   FINDINGS AND IMPRESSION:   Left-sided pacemaker is present.       There is no fracture or dislocation. Moderate to severe glenohumeral   osteoarthritis is present. There is increased, ill-defined calcification   overlying the humeral head in the area of the rotator cuff tendons   suggestive of calcific tendinopathy/tendinitis. Correlation with patient   history is recommended.       This report was  finalized on 2/6/2019 10:44 AM by Dr. Edgar Lopez M.D.               I ordered the above noted radiological studies. Interpreted by radiologist.Reviewed by me in PACS.       PROCEDURES  Procedures      PROGRESS AND CONSULTS       0948  Ordered XR left shoulder for further evaluation. Ordered norco for pain and zofran for nausea.     1057  Rechecked Pt who is resting comfortably. Informed her that her XRs show calcific tendonitis in her right shoulder which is most likely responsible for her pain with movement of her shoulder. She also likely has residual nerve pain from the shingles that were in the area.  Discussed plans for discharge and instructed her to keep her scheduled follow up with ortho and PT. Pt understands and agrees to all plans. All questions answered.       MEDICAL DECISION MAKING  Results were reviewed/discussed with the patient and they were also made aware of online access. Pt also made aware that some labs, such as cultures, will not be resulted during ER visit and follow up with PMD is necessary.     MDM  Number of Diagnoses or Management Options  Calcific tendonitis of left shoulder:      Amount and/or Complexity of Data Reviewed  Tests in the radiology section of CPT®: ordered and reviewed (XR left shoulder shows no fracture or dislocation. However, there is increased, ill-defined calcification overlying the humeral head in the area of the rotator cuff tendons suggestive of calcific tendinopathy/tendinitis.)           DIAGNOSIS  Final diagnoses:   Calcific tendonitis of left shoulder   Post herpetic neuralgia       DISPOSITION  DISCHARGE    Patient discharged in stable condition.    Reviewed implications of results, diagnosis, meds, responsibility to follow up, warning signs and symptoms of possible worsening, potential complications and reasons to return to ER, including new or worsening symptoms.    Patient/Family voiced understanding of above instructions.    Discussed plan for  discharge, as there is no emergent indication for admission. Patient referred to primary care provider for BP management due to today's BP. Pt/family is agreeable and understands need for follow up and repeat testing.  Pt is aware that discharge does not mean that nothing is wrong but it indicates no emergency is present that requires admission and they must continue care with follow-up as given below or physician of their choice.     FOLLOW-UP  No follow-up provider specified.       Medication List      New Prescriptions    HYDROcodone-acetaminophen 5-325 MG per tablet  Commonly known as:  NORCO  Take 1 tablet by mouth Every 8 (Eight) Hours As Needed for Moderate Pain .     MethylPREDNISolone 4 MG tablet  Commonly known as:  MEDROL (YUNG)  Take as directed on package instructions.        Changed    gabapentin 300 MG capsule  Commonly known as:  NEURONTIN  Take 1 capsule by mouth 4 (Four) Times a Day.  What changed:  when to take this              Latest Documented Vital Signs:  As of 11:17 AM  BP- 119/55 HR- 74 Temp- 98.2 °F (36.8 °C) (Tympanic) O2 sat- 98%    --  Documentation assistance provided by nuris Veloz for Dr. Flores.  Information recorded by the scribe was done at my direction and has been verified and validated by me.       Gunjan Veloz  02/06/19 1115       Ricardo Flores MD  02/06/19 6229

## 2019-02-06 NOTE — ED NOTES
"Pt c/o left shoulder pain since \"the beginning of December 2018\"     Vivian Cancino, RN  02/06/19 0912    "

## 2019-02-06 NOTE — DISCHARGE INSTRUCTIONS
Use medications as directed and follow up with your physical therapist and the orthopedist as scheduled.  DO NOT put the sling back on.

## 2019-02-11 ENCOUNTER — APPOINTMENT (OUTPATIENT)
Dept: MAMMOGRAPHY | Facility: HOSPITAL | Age: 84
End: 2019-02-11

## 2019-02-12 ENCOUNTER — CLINICAL SUPPORT NO REQUIREMENTS (OUTPATIENT)
Dept: CARDIOLOGY | Facility: CLINIC | Age: 84
End: 2019-02-12

## 2019-02-12 DIAGNOSIS — I44.2 COMPLETE HEART BLOCK (HCC): Primary | ICD-10-CM

## 2019-02-12 PROCEDURE — 93280 PM DEVICE PROGR EVAL DUAL: CPT | Performed by: INTERNAL MEDICINE

## 2019-02-13 ENCOUNTER — TREATMENT (OUTPATIENT)
Dept: PHYSICAL THERAPY | Facility: CLINIC | Age: 84
End: 2019-02-13

## 2019-02-13 ENCOUNTER — OFFICE VISIT (OUTPATIENT)
Dept: FAMILY MEDICINE CLINIC | Facility: CLINIC | Age: 84
End: 2019-02-13

## 2019-02-13 VITALS
SYSTOLIC BLOOD PRESSURE: 140 MMHG | WEIGHT: 223.2 LBS | DIASTOLIC BLOOD PRESSURE: 78 MMHG | TEMPERATURE: 97.5 F | BODY MASS INDEX: 38.1 KG/M2 | HEIGHT: 64 IN | HEART RATE: 68 BPM | OXYGEN SATURATION: 96 %

## 2019-02-13 DIAGNOSIS — M19.019 SHOULDER ARTHRITIS: Primary | ICD-10-CM

## 2019-02-13 DIAGNOSIS — G89.29 CHRONIC LEFT SHOULDER PAIN: Primary | ICD-10-CM

## 2019-02-13 DIAGNOSIS — M75.112 INCOMPLETE TEAR OF LEFT ROTATOR CUFF: ICD-10-CM

## 2019-02-13 DIAGNOSIS — B02.29 POSTHERPETIC NEURALGIA: ICD-10-CM

## 2019-02-13 DIAGNOSIS — M25.512 CHRONIC LEFT SHOULDER PAIN: Primary | ICD-10-CM

## 2019-02-13 PROCEDURE — 97162 PT EVAL MOD COMPLEX 30 MIN: CPT | Performed by: PHYSICAL THERAPIST

## 2019-02-13 PROCEDURE — 97110 THERAPEUTIC EXERCISES: CPT | Performed by: PHYSICAL THERAPIST

## 2019-02-13 PROCEDURE — 99214 OFFICE O/P EST MOD 30 MIN: CPT | Performed by: FAMILY MEDICINE

## 2019-02-13 RX ORDER — GABAPENTIN 100 MG/1
100 CAPSULE ORAL 3 TIMES DAILY
Qty: 90 CAPSULE | Refills: 2 | Status: SHIPPED | OUTPATIENT
Start: 2019-02-13 | End: 2019-03-08

## 2019-02-13 RX ORDER — GABAPENTIN 100 MG/1
100 CAPSULE ORAL 3 TIMES DAILY
Qty: 90 CAPSULE | Refills: 2 | Status: SHIPPED | OUTPATIENT
Start: 2019-02-13 | End: 2019-02-13 | Stop reason: SDUPTHER

## 2019-02-13 RX ORDER — TRAMADOL HYDROCHLORIDE 50 MG/1
50 TABLET ORAL 2 TIMES DAILY PRN
Qty: 20 TABLET | Refills: 1 | Status: SHIPPED | OUTPATIENT
Start: 2019-02-13 | End: 2019-02-13 | Stop reason: SDUPTHER

## 2019-02-13 RX ORDER — TRAMADOL HYDROCHLORIDE 50 MG/1
50 TABLET ORAL 2 TIMES DAILY PRN
Qty: 20 TABLET | Refills: 1 | Status: SHIPPED | OUTPATIENT
Start: 2019-02-13 | End: 2019-03-08

## 2019-02-13 NOTE — PROGRESS NOTES
"Physical Therapy Initial Evaluation and Plan of Care    Patient: Brittany Villeda   : 1935  Diagnosis/ICD-10 Code:  Chronic left shoulder pain [M25.512, G89.29]  Referring practitioner: Mega Esparza MD    Subjective Evaluation    History of Present Illness  Date of onset: 2018  Mechanism of injury: Pt reports pacemaker don on 2018 and going to room had pain going from left side of neck to shoulder and was dx with shingles.  Pt reports anterior, lateral and posterior shoulder pain.      PMH: no previous shoulder pain prior to     Pain  Current pain ratin  At best pain ratin  At worst pain ratin  Location: denies numbness or tingling  Quality: severe pain.  Alleviating factors: nothing known.  Aggravating factors: sleeping (taking top off juice)  Progression: worsening    Hand dominance: right    Diagnostic Tests  X-ray: abnormal (details in Epic, moderate to severe osteoarthritis and calcifications)    Treatments  Previous treatment: medication  Patient Goals  Patient goals for therapy: decreased pain (be able to use arm)             Objective       Palpation   Left   No palpable tenderness to the supraspinatus.   Hypertonic in the infraspinatus.   Tenderness of the infraspinatus.     Tenderness     Left Shoulder   Tenderness in the AC joint, acromion, biceps tendon (proximal), infraspinatus tendon, subacromial bursa, subscapularis tendon and supraspinatus tendon.     Active Range of Motion   Left Shoulder   Flexion: 80 (gross assessment) degrees   Abduction: 75 (gross assessment) degrees     Additional Active Range of Motion Details  Cross cervical AROM WNL and reports of \"muscle pull\" only and no increased pain and no reproduction of symptoms.  \"pull\" reported with right side-bending and right rotation      Strength/Myotome Testing     Left Shoulder     Planes of Motion   Flexion: 3-   Abduction: 3-   External rotation at 0°: 4-   Internal rotation at 0°: 4-     Tests "     Left Shoulder   Positive Hawkin's and passive horizontal adduction.   Negative belly press, external rotation lag sign and painful arc.          Assessment & Plan     Assessment  Impairments: abnormal or restricted ROM, impaired physical strength, lacks appropriate home exercise program and pain with function  Assessment details: Pt is pleasant 83 y.o. Female who presents with chronic left shoulder pain since pacemaker was placed in November 2018.  Pt demonstrates signs and symptoms consistent with right glenohumeral joint degeneration and rotator cuff tear.  Pt requires further medical evaluation by orthopedist, but will also require skilled physical therapy to manage pain and improve ROM pending evaluation of MD.   Prognosis: good  Functional Limitations: carrying objects, lifting, pushing, uncomfortable because of pain, reaching behind back and reaching overhead  Goals  Plan Goals: Short - Term Goals - In 2 weeks:  1. Pt will tolerate AAROM exercises with </= mild increased pain.  2. Pt will be (I) with initial HEP.    Long - Term Goals - to be assessed and planned following scheduled MD follow up.    Plan  Therapy options: will be seen for skilled physical therapy services  Planned modality interventions: cryotherapy and thermotherapy (hydrocollator packs)  Planned therapy interventions: body mechanics training, functional ROM exercises, home exercise program, joint mobilization, manual therapy, neuromuscular re-education, soft tissue mobilization, strengthening, therapeutic activities and stretching  Duration in visits: 4  Treatment plan discussed with: patient and family  Plan details: Daughter educated post-evaluation and treatment        Manual Therapy:    -     mins  57324;  Therapeutic Exercise:    10     mins  87752;     Neuromuscular Mat:    -   mins  11306;    Therapeutic Activity:     -     mins  41162;     Gait Training:      -     mins  19916;     Ultrasound:     -     mins  11419;    Electrical  Stimulation:    -     mins  30438 ( );  Dry Needling     -     mins self-pay    Timed Treatment:   10   mins   Total Treatment:     40   mins    PT SIGNATURE: Karen Ramsay, PT DPT   DATE TREATMENT INITIATED: 2/13/2019    Initial Certification  Certification Period: 5/14/2019  I certify that the therapy services are furnished while this patient is under my care.  The services outlined above are required by this patient, and will be reviewed every 90 days.     PHYSICIAN: Mega Esparza MD      DATE:     Please sign and return via fax to [phone]. Thank you, [facility].

## 2019-02-13 NOTE — PROGRESS NOTES
"Subjective   Brittany Villeda is a 83 y.o. female.     Chief Complaint   Patient presents with   • Herpes Zoster     follow up er   • Shoulder Pain     left         History of Present Illness    Left shoulder pain.  Chronic.  Rotator cuff tear.  Probable frozen shoulder.  Seen by physical therapy.  Has an appointment coming up for second opinion with [facility] orthopedic surgeon Dr. Lovell.  She complains of ongoing pain that is worse with range of motion.  Especially at nighttime.  She also has burning tingling pain along the distribution of the shingles in the left neck and left shoulder.  She continues gabapentin 300 mg 3 times a day.  If she takes more than that she gets too groggy.  Hydrocodone has not helped her.  It was prescribed by previous orthopedic doctor.  She cannot take NSAIDs because of her warfarin use and paroxysmal atrial fibrillation.      The following portions of the patient's history were reviewed and updated as appropriate: allergies, current medications, past family history, past medical history, past social history, past surgical history and problem list.          Review of Systems   Musculoskeletal: Positive for arthralgias.   Neurological: Positive for weakness.   Psychiatric/Behavioral: Negative for dysphoric mood.       Objective   Blood pressure 140/78, pulse 68, temperature 97.5 °F (36.4 °C), temperature source Oral, height 162.6 cm (64.02\"), weight 101 kg (223 lb 3.2 oz), SpO2 96 %.  Physical Exam   Constitutional: No distress.   Looks tired.   Cardiovascular: Normal rate.   Pulmonary/Chest: Effort normal.   Musculoskeletal:   Left shoulder with limited range of motion.  Positive impingement testing.  She cannot abduct past about 45 degrees.  She also has mild allodynia to light palpation and light touch at the neck and shoulder.       Assessment/Plan   Brittany was seen today for herpes zoster and shoulder pain.    Diagnoses and all orders for this " visit:    Shoulder arthritis    Postherpetic neuralgia    Other orders  -     Discontinue: traMADol (ULTRAM) 50 MG tablet; Take 1 tablet by mouth 2 (Two) Times a Day As Needed (shoulder pain).  -     Discontinue: gabapentin (NEURONTIN) 100 MG capsule; Take 1 capsule by mouth 3 (Three) Times a Day. Take with Gabapentin 300mg as directed  -     gabapentin (NEURONTIN) 100 MG capsule; Take 1 capsule by mouth 3 (Three) Times a Day. Take with Gabapentin 300mg as directed  -     traMADol (ULTRAM) 50 MG tablet; Take 1 tablet by mouth 2 (Two) Times a Day As Needed (shoulder pain).      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  Developing chronic pain syndrome.  She does not respond well to hydrocodone.  I am recommending tramadol 1/2-1 tablet twice a day as needed for more severe shoulder pain.  Especially at nighttime.  She is aware this may cause sedation or agitation.  Stop if it does.  May be habit forming.    Continue gabapentin.  This time we are going from 300 mg 3 times a day up to 400 mg 3 times a day with gradual addition of 100 mg of gabapentin.  I will see her back within 3 weeks for recheck.    Paroxysmal atrial fibrillation.  Followed by cardiology.

## 2019-02-14 NOTE — PATIENT INSTRUCTIONS
Pathology and involved anatomy  HEP performance  Please view [facility] Brittany Villeda for a complete list of HEP instructions.  Handout given for HEP

## 2019-02-20 ENCOUNTER — TREATMENT (OUTPATIENT)
Dept: PHYSICAL THERAPY | Facility: CLINIC | Age: 84
End: 2019-02-20

## 2019-02-20 DIAGNOSIS — M75.112 INCOMPLETE TEAR OF LEFT ROTATOR CUFF: ICD-10-CM

## 2019-02-20 DIAGNOSIS — M25.512 CHRONIC LEFT SHOULDER PAIN: Primary | ICD-10-CM

## 2019-02-20 DIAGNOSIS — G89.29 CHRONIC LEFT SHOULDER PAIN: Primary | ICD-10-CM

## 2019-02-20 PROCEDURE — 97110 THERAPEUTIC EXERCISES: CPT | Performed by: PHYSICAL THERAPIST

## 2019-02-20 NOTE — PATIENT INSTRUCTIONS
Please view [facility] Brittany Villeda for a complete list of HEP instructions.  Handout given for HEP

## 2019-02-20 NOTE — PROGRESS NOTES
Physical Therapy Daily Progress Note      Brittanyyuri Shaferg reports: shoulder is a little better and pain patch is helping more than pain medicine did.      Subjective     Objective   See Exercise, Manual, and Modality Logs for complete treatment.       Assessment & Plan     Assessment  Assessment details: Pt reported increased pain during PROM, ER > flexion and manual therapy was deferred.  Remainder of treatment session focused on AAROM to tolerance for flexion, scaption, abduction and ER.  Pt demonstrated low tolerance to active and AAROM.  Pt instructed on performing small motion to the point of discomfort.  Updated HEP with additional AAROM exercises today.          Progress per Plan of Care pending results from scheduled orthopedic appointment.            Manual Therapy:    5     mins  01162;  Therapeutic Exercise:    20     mins  78569;     Neuromuscular Mat:    -    mins  36834;    Therapeutic Activity:     -     mins  60892;     Gait Training:      -     mins  79012;     Ultrasound:     -     mins  68097;    Electrical Stimulation:    -     mins  86424 ( );  Dry Needling     -     mins self-pay    Timed Treatment:   25   mins   Total Treatment:     25   mins    Karen Ramsay, PT DPT  Physical Therapist

## 2019-02-22 RX ORDER — OMEPRAZOLE 20 MG/1
CAPSULE, DELAYED RELEASE ORAL
Qty: 90 CAPSULE | Refills: 0 | Status: SHIPPED | OUTPATIENT
Start: 2019-02-22 | End: 2019-04-29 | Stop reason: SDUPTHER

## 2019-03-01 ENCOUNTER — OFFICE VISIT (OUTPATIENT)
Dept: ORTHOPEDIC SURGERY | Facility: CLINIC | Age: 84
End: 2019-03-01

## 2019-03-01 VITALS — WEIGHT: 227 LBS | TEMPERATURE: 98.4 F | HEIGHT: 64 IN | BODY MASS INDEX: 38.76 KG/M2

## 2019-03-01 DIAGNOSIS — M19.012 PRIMARY LOCALIZED OSTEOARTHROSIS OF LEFT SHOULDER REGION: Primary | ICD-10-CM

## 2019-03-01 PROCEDURE — 99204 OFFICE O/P NEW MOD 45 MIN: CPT | Performed by: ORTHOPAEDIC SURGERY

## 2019-03-01 PROCEDURE — 20610 DRAIN/INJ JOINT/BURSA W/O US: CPT | Performed by: ORTHOPAEDIC SURGERY

## 2019-03-01 RX ADMIN — LIDOCAINE HYDROCHLORIDE 4 ML: 10 INJECTION, SOLUTION EPIDURAL; INFILTRATION; INTRACAUDAL; PERINEURAL at 14:06

## 2019-03-01 RX ADMIN — METHYLPREDNISOLONE ACETATE 80 MG: 80 INJECTION, SUSPENSION INTRA-ARTICULAR; INTRALESIONAL; INTRAMUSCULAR; SOFT TISSUE at 14:06

## 2019-03-01 NOTE — PROGRESS NOTES
New left Shoulder      Patient: Brittany Villeda        YOB: 1935    Medical Record Number: [id number]        Chief Complaints: left shoulder pain  Chief Complaint   Patient presents with   • Left Shoulder - Establish Care, Pain           History of Present Illness: This is a 83-year-old female who presents complaining of left shoulder pain she is right-hand dominant she states began on 11/7/2018 when she got a pacemaker note dedicated history injury change in activity her symptoms are severe constant aching loss of range of motion worse with activity somewhat better with rest she is retired her past medical history smart for colitis anemia    Allergies:   Allergies   Allergen Reactions   • Codeine Hallucinations   • Amitriptyline Rash   • Amoxicillin-Pot Clavulanate Rash   • Aspirin Unknown (See Comments)     Patient doesn't know why   • Bactrim [Sulfamethoxazole-Trimethoprim] Rash   • Iodinated Diagnostic Agents Rash   • Latex Rash   • Naproxen Rash   • Nsaids Unknown (See Comments)     unkknown   • Soma Compound With Codeine [Carisoprodol-Aspirin-Codeine] Rash   • Sulfa Antibiotics Rash       Medications:   Home Medications:  Current Outpatient Medications on File Prior to Visit   Medication Sig   • Acetaminophen (PAIN RELIEVER PO) Take 1 tablet by mouth As Needed.   • calcium carbonate (OS-ALIDA) 600 MG tablet Take 600 mg by mouth Daily.   • furosemide (LASIX) 20 MG tablet Take 2 tablets by mouth Every Morning. 20mg in the PM   • gabapentin (NEURONTIN) 100 MG capsule Take 1 capsule by mouth 3 (Three) Times a Day. Take with Gabapentin 300mg as directed   • gabapentin (NEURONTIN) 300 MG capsule Take 1 capsule by mouth 4 (Four) Times a Day. (Patient taking differently: Take 300 mg by mouth 3 (Three) Times a Day.)   • mesalamine (LIALDA) 1.2 g EC tablet Take 4 tablets by mouth Daily.   • mometasone (ELOCON) 0.1 % ointment Apply 1 application topically to the appropriate area as directed 2 (Two)  Times a Day. As needed   • omeprazole (priLOSEC) 20 MG capsule TAKE 1 CAPSULE EVERY DAY   • traMADol (ULTRAM) 50 MG tablet Take 1 tablet by mouth 2 (Two) Times a Day As Needed (shoulder pain).   • vitamin B-12 (CYANOCOBALAMIN) 1000 MCG tablet Take 1,000 mcg by mouth Daily.   • warfarin (COUMADIN) 5 MG tablet TAKE 1 TABLET EVERY DAY     No current facility-administered medications on file prior to visit.      Current Medications:  Scheduled Meds:  Continuous Infusions:  No current facility-administered medications for this visit.   PRN Meds:.    Past Medical History:   Diagnosis Date   • Anemia    • Arrhythmia    • Arthritis    • Asthma    • Chest pain    • Colitis    • COPD (chronic obstructive pulmonary disease) (CMS/HCC)    • Diastolic dysfunction    • Essential hypertension 5/12/2016   • GERD (gastroesophageal reflux disease)    • Hypertension    • Hypertensive heart disease    • Hyperthyroidism    • Kidney stone    • Low back pain    • MGUS (monoclonal gammopathy of unknown significance)    • Nephrolithiasis    • Obesity    • Paroxysmal atrial fibrillation (CMS/HCC)    • Peptic ulceration    • Pulmonary hypertension (CMS/HCC)    • Sleep apnea    • Ventricular tachycardia (CMS/HCC)     nonsustained   • Vertigo         Past Surgical History:   Procedure Laterality Date   • BACK SURGERY      lumbar fusion   • CARDIAC ELECTROPHYSIOLOGY PROCEDURE N/A 11/7/2018    Procedure: Pacemaker DC new   [place];  Surgeon: Jesus Granda MD;  Location: [facility] INVASIVE LOCATION;  Service: Cardiology   • CHOLECYSTECTOMY     • COLONOSCOPY  06/16/2014    colitis, cryptitis,  tics, NBIH, TA w/low grade dysplasia   • HEMORRHOIDECTOMY     • HYSTERECTOMY     • KNEE ARTHROPLASTY     • MYOMECTOMY     • SHOULDER SURGERY     • SINUS SURGERY     • TONSILLECTOMY          Social History     Occupational History   • Occupation: Caregiver     Employer: RETIRED     Comment: worked for [employer]   Tobacco Use   • Smoking  status: Former Smoker     Packs/day: 1.50     Years: 10.00     Pack years: 15.00     Start date:      Last attempt to quit:      Years since quittin.1   • Smokeless tobacco: Never Used   • Tobacco comment: QUIT SMOKING    Substance and Sexual Activity   • Alcohol use: No     Comment: Daily caffeine use - one cup of coffee   • Drug use: No   • Sexual activity: No    Social History     Social History Narrative   • Not on file        Family History   Problem Relation Age of Onset   • Diabetes Mother    • Breast cancer Sister    • Kidney cancer Sister    • Heart disease Sister    • Prostate cancer Brother    • Prostate cancer Brother    • Prostate cancer Brother              Review of Systems: 14 point review of systems are remarkable for the pertinent positives listed in the chart by the patient the remainder are negative    Review of Systems      Physical Exam: 83 y.o. female  General Appearance:    Alert, cooperative, in no acute distress                 There were no vitals filed for this visit.   Patient is alert and read ×3 no acute distress appears her above-listed at height weight and age.  Affect is normal respiratory rate is normal unlabored. Heart rate regular rate rhythm, sclera, dentition and hearing are normal for the purpose of this exam.    Ortho Exam  Physical exam the left shoulder reveals no overlying skin changes no lymphedema lymphadenopathy the patient can actively flex to about 150 passively I get them to 160 abduction is similar external rotation is 40 internal rotation to there buttock.  Rotator cuff strength is 4+ over 5 with isometric strength testing no overlying skin changes.  Patient has reasonable cervical range of motion for their age no radicular symptoms and a normal elbow exam.  There are good distal pulses.  Large Joint Arthrocentesis: L glenohumeral  Date/Time: 3/1/2019 2:06 PM  Consent given by: patient  Site marked: site marked  Timeout: Immediately prior to  procedure a time out was called to verify the correct patient, procedure, equipment, support staff and site/side marked as required   Supporting Documentation  Indications: pain   Procedure Details  Location: shoulder - L glenohumeral  Preparation: Patient was prepped and draped in the usual sterile fashion  Needle size: 22 G  Approach: posterior  Medications administered: 4 mL lidocaine PF 1% 1 %; 80 mg methylPREDNISolone acetate 80 MG/ML  Patient tolerance: patient tolerated the procedure well with no immediate complications                Radiology:   AP, Scapular Y  of the left shoulder were ordered/reviewed to evauate shoulder pain.  These were [facility] on [facility] she does have marked degenerative change about her glenohumeral joint with near complete loss of joint space  Imaging Results (most recent)     None        Assessment/Plan: Left shoulder pain this is arthritis in origin.  I suspect may be from positioning on the table at the time of her pacemaker or using her arm or his irritated arthritis.  I think it is reasonable to proceed with an injection we also talked about arthroplasty she will start with the injection if she fails to improve we will pursue other means of treatment

## 2019-03-04 ENCOUNTER — ANTICOAGULATION VISIT (OUTPATIENT)
Dept: PHARMACY | Facility: HOSPITAL | Age: 84
End: 2019-03-04

## 2019-03-04 DIAGNOSIS — I48.0 PAROXYSMAL ATRIAL FIBRILLATION (HCC): ICD-10-CM

## 2019-03-04 LAB
INR PPP: 3.9 (ref 0.91–1.09)
PROTHROMBIN TIME: 46.4 SECONDS (ref 10–13.8)

## 2019-03-04 PROCEDURE — G0463 HOSPITAL OUTPT CLINIC VISIT: HCPCS

## 2019-03-04 PROCEDURE — 85610 PROTHROMBIN TIME: CPT

## 2019-03-04 PROCEDURE — 36416 COLLJ CAPILLARY BLOOD SPEC: CPT

## 2019-03-04 NOTE — PROGRESS NOTES
Anticoagulation Clinic Progress Note    Anticoagulation Summary  As of 3/4/2019    INR goal:   2.0-3.0   TTR:   79.1 % (4.9 mo)   INR used for dosing:   3.9! (3/4/2019)   Warfarin maintenance plan:   2.5 mg on Thu, Sat; 5 mg all other days   Weekly warfarin total:   30 mg   Plan last modified:   Janice Hrat [facility] (9/27/2018)   Next INR check:   3/11/2019   Priority:   Maintenance   Target end date:   Indefinite    Indications    Paroxysmal atrial fibrillation (CMS/HCC) [I48.0]             Anticoagulation Episode Summary     INR check location:       Preferred lab:       Send INR reminders to:    [facility] CLINICAL POOL    Comments:         Anticoagulation Care Providers     Provider Role Specialty Phone number    Mary Grace Culp MD Referring Cardiology [phone]    Janice Hart [facility] Responsible Pharmacy [phone]          Clinic Interview:  Patient Findings     Positives:   Change in health, Change in medications, Change in   diet/appetite    Negatives:   Signs/symptoms of thrombosis, Signs/symptoms of bleeding,   Laboratory test error suspected, Change in alcohol use, Change in   activity, Upcoming invasive procedure, Emergency department visit,   Upcoming dental procedure, Missed doses, Extra doses, Hospital admission,   Bruising, Other complaints    Comments:   Began tramadol, which may increase INR; however, stopped   sometime last week due to GI upset that also caused significantly reduced   appetite (decreased vit k). GI upset not yet improved.       Clinical Outcomes     Negatives:   Major bleeding event, Thromboembolic event,   Anticoagulation-related hospital admission, Anticoagulation-related ED   visit, Anticoagulation-related fatality    Comments:   Began tramadol, which may increase INR; however, stopped   sometime last week due to GI upset that also caused significantly reduced   appetite (decreased vit k). GI upset not yet improved.         INR History:  Anticoagulation Monitoring  1/9/2019 2/4/2019 3/4/2019   INR 2.2 2.1 3.9   INR Date 1/9/2019 2/4/2019 3/4/2019   INR Goal 2.0-3.0 2.0-3.0 2.0-3.0   Trend Same Same Same   Last Week Total 30 mg 30 mg 30 mg   Next Week Total 30 mg 30 mg 27.5 mg   Sun 5 mg 5 mg 5 mg   Mon 5 mg 5 mg 2.5 mg (3/4)   Tue 5 mg 5 mg 5 mg   Wed 5 mg 5 mg 5 mg   Thu 2.5 mg 2.5 mg 2.5 mg   Fri 5 mg 5 mg 5 mg   Sat 2.5 mg 2.5 mg 2.5 mg   Visit Report - - -   Some recent data might be hidden       Plan:  1. INR is Supratherapeutic today- see above in Anticoagulation Summary.  Will instruct Brittany Villeda to Change their warfarin regimen- see above in Anticoagulation Summary.  2. Follow up in 1 week  3. Patient declines warfarin refills.  4. Verbal and written information provided. Patient expresses understanding and has no further questions at this time.    Rusty Interiano [facility]

## 2019-03-07 RX ORDER — LIDOCAINE HYDROCHLORIDE 10 MG/ML
4 INJECTION, SOLUTION EPIDURAL; INFILTRATION; INTRACAUDAL; PERINEURAL
Status: COMPLETED | OUTPATIENT
Start: 2019-03-01 | End: 2019-03-01

## 2019-03-07 RX ORDER — METHYLPREDNISOLONE ACETATE 80 MG/ML
80 INJECTION, SUSPENSION INTRA-ARTICULAR; INTRALESIONAL; INTRAMUSCULAR; SOFT TISSUE
Status: COMPLETED | OUTPATIENT
Start: 2019-03-01 | End: 2019-03-01

## 2019-03-08 ENCOUNTER — OFFICE VISIT (OUTPATIENT)
Dept: FAMILY MEDICINE CLINIC | Facility: CLINIC | Age: 84
End: 2019-03-08

## 2019-03-08 VITALS
WEIGHT: 227 LBS | SYSTOLIC BLOOD PRESSURE: 121 MMHG | HEIGHT: 64 IN | HEART RATE: 66 BPM | BODY MASS INDEX: 38.76 KG/M2 | OXYGEN SATURATION: 97 % | TEMPERATURE: 97.1 F | DIASTOLIC BLOOD PRESSURE: 74 MMHG

## 2019-03-08 DIAGNOSIS — B02.29 POSTHERPETIC NEURALGIA: Primary | ICD-10-CM

## 2019-03-08 DIAGNOSIS — M19.019 SHOULDER ARTHRITIS: ICD-10-CM

## 2019-03-08 PROCEDURE — 99213 OFFICE O/P EST LOW 20 MIN: CPT | Performed by: FAMILY MEDICINE

## 2019-03-08 RX ORDER — GENTAMICIN SULFATE 1 MG/G
CREAM TOPICAL 3 TIMES DAILY
COMMUNITY
Start: 2019-03-04 | End: 2019-08-19

## 2019-03-08 NOTE — PROGRESS NOTES
"Subjective   Brittany Villeda is a 83 y.o. female.     Chief Complaint   Patient presents with   • Shoulder Pain     left shoulder        History of Present Illness    Follow-up shoulder pain.  Left-sided.  Also post herpetic neuralgia.  After last visit she was in significant left shoulder pain.  We added tramadol.  She took a couple and upset her stomach and she stopped.  She felt as if the gabapentin improved her neuralgia pain on the left side of the neck and head.  She went to the orthopedic surgeon.  She had a cortisone injection in her left shoulder.  It helped the pain tremendously.  She now has a near full range of motion without pain.  She has a follow-up appointment with orthopedic doctor coming up in the next month or so.      The following portions of the patient's history were reviewed and updated as appropriate: allergies, current medications, past family history, past medical history, past social history, past surgical history and problem list.          Review of Systems   Constitutional: Negative.    Respiratory: Negative.    Cardiovascular: Negative.        Objective   Blood pressure 121/74, pulse 66, temperature 97.1 °F (36.2 °C), temperature source Oral, height 162.6 cm (64.02\"), weight 103 kg (227 lb), SpO2 97 %.  Physical Exam   Constitutional: She appears well-developed and well-nourished. No distress.   Neck: No thyromegaly present.   Cardiovascular: Normal rate, normal heart sounds and intact distal pulses.   Irregularly irregular rhythm rate controlled   Pulmonary/Chest: Effort normal and breath sounds normal.   Musculoskeletal: She exhibits no edema.   Much improved range of motion left shoulder   Skin: Skin is warm and dry.   Psychiatric: She has a normal mood and affect. Her behavior is normal. Judgment and thought content normal.   Nursing note and vitals reviewed.      Assessment/Plan   Brittany was seen today for shoulder pain.    Diagnoses and all orders for this " visit:    Postherpetic neuralgia    Shoulder arthritis      Postherpetic neuralgia and shoulder arthritis.  Some overlap.  Predominantly left shoulder arthritis.  Getting better.  Decrease gabapentin down to 300 mg 3 times a day.  We can consider decreasing more in the future.  She will follow-up with her orthopedic doctor as scheduled.  Otherwise see me in 6 months.

## 2019-03-11 ENCOUNTER — ANTICOAGULATION VISIT (OUTPATIENT)
Dept: PHARMACY | Facility: HOSPITAL | Age: 84
End: 2019-03-11

## 2019-03-11 DIAGNOSIS — I48.0 PAROXYSMAL ATRIAL FIBRILLATION (HCC): ICD-10-CM

## 2019-03-11 LAB
INR PPP: 3.1 (ref 0.91–1.09)
PROTHROMBIN TIME: 37.4 SECONDS (ref 10–13.8)

## 2019-03-11 PROCEDURE — 85610 PROTHROMBIN TIME: CPT

## 2019-03-11 PROCEDURE — 36416 COLLJ CAPILLARY BLOOD SPEC: CPT

## 2019-03-11 NOTE — PROGRESS NOTES
Anticoagulation Clinic Progress Note    Anticoagulation Summary  As of 3/11/2019    INR goal:   2.0-3.0   TTR:   79.1 % (4.9 mo)   INR used for dosing:      Warfarin maintenance plan:   2.5 mg on Thu, Sat; 5 mg all other days   Weekly warfarin total:   30 mg   No change documented:   Fiorella Baires   Plan last modified:   Janice Hart, [facility] (9/27/2018)   Next INR check:   3/26/2019   Priority:   High   Target end date:   Indefinite    Indications    Paroxysmal atrial fibrillation (CMS/HCC) [I48.0]             Anticoagulation Episode Summary     INR check location:       Preferred lab:       [facility]ers [facility]:    [facility]    Comments:         Anticoagulation Care Providers     Provider Role Specialty Phone number    Mary Grace Culp MD Referring Cardiology [phone]    Janice Hart [facility] Responsible Pharmacy [phone]          Clinic Interview:  Patient Findings     Positives:   Change in medications        INR History:  Anticoagulation Monitoring 2/4/2019 3/4/2019 3/11/2019   INR 2.1 3.9 -   INR Date 2/4/2019 3/4/2019 -   INR Goal 2.0-3.0 2.0-3.0 2.0-3.0   Trend Same Same Same   Last Week Total 30 mg 30 mg 27.5 mg   Next Week Total 30 mg 27.5 mg 30 mg   Sun 5 mg 5 mg 5 mg   Mon 5 mg 2.5 mg (3/4) 5 mg   Tue 5 mg 5 mg 5 mg   Wed 5 mg 5 mg 5 mg   Thu 2.5 mg 2.5 mg 2.5 mg   Fri 5 mg 5 mg 5 mg   Sat 2.5 mg 2.5 mg 2.5 mg   Visit Report - - -   Some recent data might be hidden       Plan:  1. INR is therapeutic today- see above in Anticoagulation Summary.   Will instruct Brittany Villeda to continue their warfarin regimen- see above in Anticoagulation Summary.  2. Follow up in 2 weeks.  3. Patient declines warfarin refills.  4. Verbal and written information provided. Patient expresses understanding and has no further questions at this time.    Fiorella Baires

## 2019-03-11 NOTE — PROGRESS NOTES
Anticoagulation Clinic Progress Note    Anticoagulation Summary  As of 3/11/2019    INR goal:   2.0-3.0   TTR:   75.5 % (5.2 mo)   INR used for dosing:   3.1! (3/11/2019)   Warfarin maintenance plan:   2.5 mg on Thu, Sat; 5 mg all other days   Weekly warfarin total:   30 mg   No change documented:   Fiorella Baires   Plan last modified:   Janice Hart [facility] (9/27/2018)   Next INR check:   3/26/2019   Priority:   High   Target end date:   Indefinite    Indications    Paroxysmal atrial fibrillation (CMS/HCC) [I48.0]             Anticoagulation Episode Summary     INR check location:       Preferred lab:       Send INR reminders to:    [facility] CLINICAL POOL    Comments:         Anticoagulation Care Providers     Provider Role Specialty Phone number    Mary Grace Culp MD Referring Cardiology [phone]    Janice Hart [facility] Responsible Pharmacy [phone]          Clinic Interview:  Patient Findings     Positives:   Change in medications    Negatives:   Signs/symptoms of thrombosis, Signs/symptoms of bleeding,   Laboratory test error suspected, Change in health, Change in alcohol use,   Change in activity, Upcoming invasive procedure, Emergency department   visit, Upcoming dental procedure, Missed doses, Extra doses, Change in   diet/appetite, Hospital admission, Bruising, Other complaints      Clinical Outcomes     Negatives:   Major bleeding event, Thromboembolic event,   Anticoagulation-related hospital admission, Anticoagulation-related ED   visit, Anticoagulation-related fatality        INR History:  Anticoagulation Monitoring 2/4/2019 3/4/2019 3/11/2019   INR 2.1 3.9 3.1   INR Date 2/4/2019 3/4/2019 3/11/2019   INR Goal 2.0-3.0 2.0-3.0 2.0-3.0   Trend Same Same Same   Last Week Total 30 mg 30 mg 27.5 mg   Next Week Total 30 mg 27.5 mg 30 mg   Sun 5 mg 5 mg 5 mg   Mon 5 mg 2.5 mg (3/4) 5 mg   Tue 5 mg 5 mg 5 mg   Wed 5 mg 5 mg 5 mg   Thu 2.5 mg 2.5 mg 2.5 mg   Fri 5 mg 5 mg 5 mg   Sat 2.5  mg 2.5 mg 2.5 mg   Visit Report - - -   Some recent data might be hidden       Plan:  1. INR is out of range- see above in Anticoagulation Summary.   Will instruct Brittany Villeda to Continue  their warfarin regimen- see above in Anticoagulation Summary.  2. Follow up in 2 weeks.   3. Patient declines warfarin refills.  4. Verbal and written information provided. Patient expresses understanding and has no further questions at this time.    Fiorella Baires

## 2019-03-13 ENCOUNTER — HOSPITAL ENCOUNTER (OUTPATIENT)
Dept: MAMMOGRAPHY | Facility: HOSPITAL | Age: 84
Discharge: HOME | End: 2019-03-13
Admitting: FAMILY MEDICINE

## 2019-03-13 DIAGNOSIS — Z12.39 BREAST SCREENING: ICD-10-CM

## 2019-03-13 PROCEDURE — 77067 SCR MAMMO BI INCL CAD: CPT

## 2019-03-13 PROCEDURE — 77063 BREAST TOMOSYNTHESIS BI: CPT

## 2019-03-14 DIAGNOSIS — R92.8 ABNORMAL MAMMOGRAM OF LEFT BREAST: Primary | ICD-10-CM

## 2019-03-18 ENCOUNTER — OFFICE VISIT (OUTPATIENT)
Dept: CARDIOLOGY | Facility: CLINIC | Age: 84
End: 2019-03-18

## 2019-03-18 VITALS
HEIGHT: 64 IN | WEIGHT: 225.8 LBS | SYSTOLIC BLOOD PRESSURE: 144 MMHG | HEART RATE: 66 BPM | DIASTOLIC BLOOD PRESSURE: 80 MMHG | BODY MASS INDEX: 38.55 KG/M2

## 2019-03-18 DIAGNOSIS — I45.9 HEART BLOCK: ICD-10-CM

## 2019-03-18 DIAGNOSIS — Z86.79 HISTORY OF ATRIAL FIBRILLATION: Primary | ICD-10-CM

## 2019-03-18 DIAGNOSIS — I51.89 DIASTOLIC DYSFUNCTION: ICD-10-CM

## 2019-03-18 DIAGNOSIS — G47.33 OSA (OBSTRUCTIVE SLEEP APNEA): ICD-10-CM

## 2019-03-18 DIAGNOSIS — I27.20 PULMONARY HYPERTENSION (HCC): ICD-10-CM

## 2019-03-18 DIAGNOSIS — I10 ESSENTIAL HYPERTENSION: ICD-10-CM

## 2019-03-18 DIAGNOSIS — Z95.0 PACEMAKER: ICD-10-CM

## 2019-03-18 PROCEDURE — 93000 ELECTROCARDIOGRAM COMPLETE: CPT | Performed by: NURSE PRACTITIONER

## 2019-03-18 PROCEDURE — 99214 OFFICE O/P EST MOD 30 MIN: CPT | Performed by: NURSE PRACTITIONER

## 2019-03-18 NOTE — PROGRESS NOTES
Date of Office Visit: 2019  Encounter Provider: Tereza Jackson, NIKITA, APRN  Place of Service: [facility] CARDIOLOGY  Patient Name: Brittany Villeda  :1935        Subjective:     Chief Complaint:  Follow-up, paroxysmal atrial fibrillation, hypertension, hypertensive heart disease, chronic anticoagulation therapy.      History of Present Illness:  Brittany Villeda is a 83 y.o. female patient of Dr. Culp.  This is my first time seeing this patient in the office today, and I have reviewed her records.    Patient has a history of hypertension, obstructive sleep apnea, obesity, and mobility, pulmonary hypertension, nonsustained ventricular tachycardia, diastolic dysfunction.    In  patient had nonsustained ventricular tachycardia.  Stress perfusion study was negative for ischemia.  Echocardiogram showed normal left ventricular size and function with moderate LVH.  2012 she had chronic obstructive pulmonary disease exacerbation as well as chest pain that resolved with treatment of her COPD.  2013 she had persistent dizziness in the setting of new onset paroxysmal atrial fibrillation.  She was admitted to the hospital and not felt to have had a stroke.  Symptoms actually resolved with ear manipulation and were felt to be more vestibular in nature.  She also developed paroxysmal atrial fibrillation with early bradycardia which resolved with treatment of sleep apnea and AV flaco blocker therapy.  She was placed on warfarin.  She also had a heme positive stool with anemia and was seen by gastroenterology and EGD and colonoscopy were done.  EGD showed mild erythema but otherwise stable.  She initiated therapy of BiPAP.  2017 she presented to the ER with chest pain.  Ruled out for MI.  Stress perfusion study was negative for ischemia with normal systolic function.  Zio patch shows sinus rhythm with episodes of SVT that was symptomatic.  2018 EKG showed  pauses in the setting of bifascicular block.  24-hour Holter showed 65 pauses 3 seconds long in duration.  Sinus rhythm was present throughout most of the study.  With complaints of palpitations PACs were noted.  There were also 14 episodes of atrial tachycardia lasting 4 beats.  There is also episode of 2-1 AV block and questionable third-degree block.  Echocardiogram showed normal systolic function, grade 1 diastolic dysfunction, borderline right ventricular enlargement, mild to moderate tricuspid valve regurgitation, and RVSP of 48 mmHg.  A sending aorta was mildly dilated at 3.8 cm.  Pacemaker placement was recommended the patient deferred.  By September 2018 patient was noted to have more symptomatic bradycardia and after much discussion underwent pacemaker placement November 2018.    Patient was seen in office 12/21/18 by Dr. Culp with her daughter, at which point she was feeling well.  She did not have recommended CT angiogram of the chest done yet at that time due to history of probable early ascending aortic aneurysm seen on echocardiogram in September.  At that point it was decided to proceed with CT angiography of the chest and she was instructed to increase her hydration and hold Lasix 3 days prior and the day following the CT scan.      Patient presents to office today for follow-up appointment.  Patient's daughter was with her in the office today, per patient preference.  Her appointment was not initially scheduled for today, and had been changed, however they had not been notified of this so she was added on to my schedule. Patient reports she has been feeling great since last visit.  She continues to have INRs checked through the INR clinic.  She reports blood pressure is always higher in the office than it is at home.  Blood pressure at home today was 129/60.  She will continue to monitor notify office of high or low readings.  She denies any chest pain, shortness of breath, shortness of breath with  exertion, palpitations, racing heartbeat sensation, edema, dizziness, syncope, near syncope, falls, fatigue, or abnormal bleeding.  She has not got her CT angiogram of the chest done since last visit, she reports that she was never called to schedule this.  We will check with Dr. Culp that this was not canceled for any particular reason and that was just an oversight with the scheduling department.    Patient to schedule 4-6 month follow-up appointment with Dr. Culp or follow-up sooner if needed for any new, recurrent, or worsening symptoms or other problems/concerns.        Past Medical History:   Diagnosis Date   • Anemia    • Arrhythmia    • Arthritis    • Asthma    • Bradycardia    • Chest pain    • Colitis    • COPD (chronic obstructive pulmonary disease) (CMS/HCC)    • Diastolic dysfunction    • Essential hypertension 5/12/2016   • GERD (gastroesophageal reflux disease)    • Heart block    • Hypertension    • Hypertensive heart disease    • Hyperthyroidism    • Kidney stone    • Low back pain    • MGUS (monoclonal gammopathy of unknown significance)    • Nephrolithiasis    • Obesity    • Paroxysmal atrial fibrillation (CMS/HCC)    • Peptic ulceration    • Pulmonary hypertension (CMS/HCC)    • Sleep apnea    • Ventricular tachycardia (CMS/HCC)     nonsustained   • Vertigo      Past Surgical History:   Procedure Laterality Date   • BACK SURGERY      lumbar fusion   • CARDIAC ELECTROPHYSIOLOGY PROCEDURE N/A 11/7/2018    Procedure: Pacemaker DC new   [place];  Surgeon: Jesus Granda MD;  Location: [facility] INVASIVE LOCATION;  Service: Cardiology   • CHOLECYSTECTOMY     • COLONOSCOPY  06/16/2014    colitis, cryptitis,  tics, NBIH, TA w/low grade dysplasia   • HEMORRHOIDECTOMY     • HYSTERECTOMY     • KNEE ARTHROPLASTY     • MYOMECTOMY     • SHOULDER SURGERY     • SINUS SURGERY     • TOE NAIL AMPUTATION  03/04/2019   • TONSILLECTOMY       Outpatient Medications Prior to Visit   Medication Sig Dispense Refill    • Acetaminophen (PAIN RELIEVER PO) Take 1 tablet by mouth As Needed.     • calcium carbonate (OS-ALIDA) 600 MG tablet Take 600 mg by mouth Daily.     • furosemide (LASIX) 20 MG tablet Take 2 tablets by mouth Every Morning. 20mg in the  tablet 1   • gabapentin (NEURONTIN) 300 MG capsule Take 1 capsule by mouth 4 (Four) Times a Day. (Patient taking differently: Take 300 mg by mouth 3 (Three) Times a Day.) 360 capsule 1   • gentamicin (GARAMYCIN) 0.1 % cream Apply  topically to the appropriate area as directed 3 (Three) Times a Day.     • mesalamine (LIALDA) 1.2 g EC tablet Take 4 tablets by mouth Daily. 360 tablet 0   • mometasone (ELOCON) 0.1 % ointment Apply 1 application topically to the appropriate area as directed 2 (Two) Times a Day As Needed. As needed     • omeprazole (priLOSEC) 20 MG capsule TAKE 1 CAPSULE EVERY DAY 90 capsule 0   • vitamin B-12 (CYANOCOBALAMIN) 1000 MCG tablet Take 1,000 mcg by mouth Daily.     • warfarin (COUMADIN) 5 MG tablet TAKE 1 TABLET EVERY DAY 90 tablet 1     No facility-administered medications prior to visit.        Allergies as of 03/18/2019 - Reviewed 03/18/2019   Allergen Reaction Noted   • Codeine Hallucinations 11/30/2017   • Amitriptyline Rash 05/12/2016   • Amoxicillin-pot clavulanate Rash 05/12/2016   • Aspirin Unknown (See Comments) 05/12/2016   • Bactrim [sulfamethoxazole-trimethoprim] Rash 05/12/2016   • Iodinated diagnostic agents Rash 05/12/2016   • Latex Rash 09/12/2016   • Naproxen Rash 05/12/2016   • Nsaids Unknown (See Comments) 05/12/2016   • Soma compound with codeine [carisoprodol-aspirin-codeine] Rash 09/12/2016   • Sulfa antibiotics Rash 05/12/2016     Social History     Socioeconomic History   • Marital status:      Spouse name: Not on file   • Number of children: 10   • Years of education: High School   • Highest education level: Not on file   Social Needs   • Financial resource strain: Not on file   • Food insecurity - worry: Not on file   •  Food insecurity - inability: Not on file   • Transportation needs - medical: Not on file   • Transportation needs - non-medical: Not on file   Occupational History   • Occupation: Caregiver     Employer: RETIRED     Comment: worked for [employer]   Tobacco Use   • Smoking status: Former Smoker     Packs/day: 1.50     Years: 10.00     Pack years: 15.00     Start date:      Last attempt to quit:      Years since quittin.2   • Smokeless tobacco: Never Used   • Tobacco comment: QUIT SMOKING    Substance and Sexual Activity   • Alcohol use: No     Comment: Daily caffeine use - one cup of coffee   • Drug use: No   • Sexual activity: No   Other Topics Concern   • Not on file   Social History Narrative   • Not on file     Family History   Problem Relation Age of Onset   • Diabetes Mother    • Breast cancer Sister    • Kidney cancer Sister    • Heart disease Sister    • Prostate cancer Brother    • Prostate cancer Brother    • Prostate cancer Brother        Review of Systems   Constitution: Negative for chills, fever, malaise/fatigue, weight gain and weight loss.   HENT: Negative for ear pain, hearing loss, nosebleeds and sore throat.    Eyes: Negative for blurred vision, double vision, redness, vision loss in left eye, vision loss in right eye and visual disturbance.   Cardiovascular:        SEE HPI.    Respiratory: Negative for cough, shortness of breath, snoring and wheezing.    Endocrine: Negative for cold intolerance and heat intolerance.   Skin: Negative for itching, rash and suspicious lesions.   Musculoskeletal: Negative for joint pain, joint swelling and myalgias.   Gastrointestinal: Negative for abdominal pain, diarrhea, hematemesis, melena, nausea and vomiting.   Genitourinary: Negative for dysuria, frequency and hematuria.   Neurological: Negative for dizziness, headaches, numbness, paresthesias and seizures.   Psychiatric/Behavioral: Negative for altered mental status and  "depression. The patient is not nervous/anxious.           Objective:     Vitals:    03/18/19 1119   BP: 144/80   BP Location: Right arm   Cuff Size: Large Adult   Pulse: 66   Weight: 102 kg (225 lb 12.8 oz)   Height: 162.6 cm (64\")     Body mass index is 38.76 kg/m².      PHYSICAL EXAM:  Physical Exam   Constitutional: She is oriented to person, place, and time. She appears well-developed and well-nourished. No distress.   Obese   HENT:   Head: Normocephalic and atraumatic.   Eyes: Pupils are equal, round, and reactive to light.   Neck: Neck supple. No JVD present. Carotid bruit is not present. No tracheal deviation present.   Cardiovascular: Normal rate, regular rhythm, normal heart sounds and intact distal pulses. Exam reveals no gallop and no friction rub.   No murmur heard.  Pulses:       Radial pulses are 2+ on the right side, and 2+ on the left side.        Posterior tibial pulses are 2+ on the right side, and 2+ on the left side.   Pulmonary/Chest: Effort normal and breath sounds normal. No respiratory distress. She has no wheezes. She has no rales.   Abdominal: Soft. Bowel sounds are normal. She exhibits no distension. There is no tenderness.   Musculoskeletal: She exhibits no edema, tenderness or deformity.   Neurological: She is alert and oriented to person, place, and time.   Skin: Skin is warm and dry. No rash noted. She is not diaphoretic. No erythema.   Psychiatric: She has a normal mood and affect. Her behavior is normal. Judgment normal.           ECG 12 Lead  Date/Time: 3/18/2019 2:51 PM  Performed by: Tereza Jackson DNP, APRN  Authorized by: Tereza Jackson DNP, SHAHID   Comparison: compared with previous ECG from 12/21/2018  Similar to previous ECG  Rhythm comments: Atrial ventricular dual paced complexes.  Rate: normal  BPM: 66    Clinical impression: abnormal EKG              Assessment:       Diagnosis Plan   1. History of atrial fibrillation     2. Essential hypertension     3. Pulmonary " hypertension (CMS/HCC)     4. Diastolic dysfunction     5. Heart block     6. HUGO (obstructive sleep apnea)     7. Pacemaker           Plan:     1. Distant history atrial fibrillation and paroxysmal SVT with sick sinus syndrome and intermittent complete heart block status post permanent pacemaker placement: Continue with routine device checks.  2. Hypertension: Patient reports blood pressure is always more elevated during office visits and at home it stays well controlled.  Blood pressure at home this morning was 129/60.  Patient to continue to monitor notify office of high or low readings.  3. History diastolic dysfunction  4. Probable early ascending aortic aneurysm: Noted by echocardiography previously.  CT angiogram of the chest has not been done since last visit.  Will double check with Dr. Culp that there was not a reason that this was held.  If not then we will get patient scheduled for this.  5. Pulmonary hypertension: RVSP 48 mmHg.  Saw Dr. aTo and refused further testing for sleep apnea.  6. History of chest pain: Negative PET scan December 2017.  No recurrence.  7. Untreated obstructive sleep apnea: Intolerant of BiPAP.    Patient to follow-up with Dr. Culp in 4-6 months or sooner if needed for any new, recurrent, or worsening symptoms or other problems/concerns.           Your medication list           Accurate as of 3/18/19 11:59 PM. If you have any questions, ask your nurse or doctor.               CHANGE how you take these medications      Instructions Last Dose Given Next Dose Due   gabapentin 300 MG capsule  Commonly known as:  NEURONTIN  What changed:  when to take this      Take 1 capsule by mouth 4 (Four) Times a Day.          CONTINUE taking these medications      Instructions Last Dose Given Next Dose Due   calcium carbonate 600 MG tablet  Commonly known as:  OS-ALIDA      Take 600 mg by mouth Daily.       furosemide 20 MG tablet  Commonly known as:  LASIX      Take 2 tablets by mouth Every  Morning. 20mg in the PM       gentamicin 0.1 % cream  Commonly known as:  GARAMYCIN      Apply  topically to the appropriate area as directed 3 (Three) Times a Day.       mesalamine 1.2 g EC tablet  Commonly known as:  LIALDA      Take 4 tablets by mouth Daily.       mometasone 0.1 % ointment  Commonly known as:  ELOCON      Apply 1 application topically to the appropriate area as directed 2 (Two) Times a Day As Needed. As needed       omeprazole 20 MG capsule  Commonly known as:  priLOSEC      TAKE 1 CAPSULE EVERY DAY       PAIN RELIEVER PO      Take 1 tablet by mouth As Needed.       vitamin B-12 1000 MCG tablet  Commonly known as:  CYANOCOBALAMIN      Take 1,000 mcg by mouth Daily.       warfarin 5 MG tablet  Commonly known as:  COUMADIN      TAKE 1 TABLET EVERY DAY              I do not stop or change the above medications.  Patient's medication list was updated to reflect medications they are currently taking including medication changes made by other providers.            Thanks,    Tereza Jackson, NIKITA, APRN  03/18/2019             Dictated utilizing Dragon dictation

## 2019-03-19 ENCOUNTER — TELEPHONE (OUTPATIENT)
Dept: CARDIOLOGY | Facility: CLINIC | Age: 84
End: 2019-03-19

## 2019-03-19 DIAGNOSIS — I71.20 THORACIC AORTIC ANEURYSM WITHOUT RUPTURE (HCC): Primary | ICD-10-CM

## 2019-03-19 PROBLEM — Z86.79 HISTORY OF ATRIAL FIBRILLATION: Status: ACTIVE | Noted: 2019-03-19

## 2019-03-19 PROBLEM — Z95.0 PACEMAKER: Status: ACTIVE | Noted: 2019-03-19

## 2019-03-19 NOTE — TELEPHONE ENCOUNTER
Dr. Culp,    I saw this patient in the office yesterday and she had not had her CT angiogram chest done.  Before reordering this I just wanted to make sure the reason that this have been held?   (Please see your previous note under section 5).     Of note, patient does report that she does not think she can raise 1 of her arms above her head due to history of shoulder issues.    If we are going to proceed with this then I will instruct patient to hold the Lasix and increase hydration 3 days prior to procedure as well as 1 day after.      Thanks,  SHAHID Rinaldi

## 2019-03-19 NOTE — TELEPHONE ENCOUNTER
She can certainly at this point given her concerns to CT of the chest without contrast with 3D reconstruction.  Please find out from radiology if they can do this with her arm by her side.( I believe they can). aravind

## 2019-03-22 ENCOUNTER — HOSPITAL ENCOUNTER (OUTPATIENT)
Dept: ULTRASOUND IMAGING | Facility: HOSPITAL | Age: 84
Discharge: HOME | End: 2019-03-22

## 2019-03-22 ENCOUNTER — HOSPITAL ENCOUNTER (OUTPATIENT)
Dept: MAMMOGRAPHY | Facility: HOSPITAL | Age: 84
Discharge: HOME | End: 2019-03-22
Admitting: FAMILY MEDICINE

## 2019-03-22 PROCEDURE — 76642 ULTRASOUND BREAST LIMITED: CPT

## 2019-03-22 PROCEDURE — 77065 DX MAMMO INCL CAD UNI: CPT

## 2019-03-25 NOTE — TELEPHONE ENCOUNTER
Okay, hopefully they will be able to at least partially raise that arm for clear images.  This is a chronic shoulder problem and so I do not expect it to get better.  We will proceed with the CT chest without.

## 2019-03-25 NOTE — TELEPHONE ENCOUNTER
Geno--please find out from radiology if there is any way that CT chest without contrast can be done if patient cannot raise left arm above head.

## 2019-03-25 NOTE — TELEPHONE ENCOUNTER
3/25/19  I spoke with Ronan, radiology tech - yes they can do the CT but the images are not as clear./raza

## 2019-03-26 ENCOUNTER — ANTICOAGULATION VISIT (OUTPATIENT)
Dept: PHARMACY | Facility: HOSPITAL | Age: 84
End: 2019-03-26

## 2019-03-26 DIAGNOSIS — I48.0 PAROXYSMAL ATRIAL FIBRILLATION (HCC): ICD-10-CM

## 2019-03-26 LAB
INR PPP: 2.5 (ref 0.91–1.09)
PROTHROMBIN TIME: 29.6 SECONDS (ref 10–13.8)

## 2019-03-26 PROCEDURE — 85610 PROTHROMBIN TIME: CPT

## 2019-03-26 PROCEDURE — 36416 COLLJ CAPILLARY BLOOD SPEC: CPT

## 2019-03-26 NOTE — PROGRESS NOTES
Anticoagulation Clinic Progress Note    Anticoagulation Summary  As of 3/26/2019    INR goal:   2.0-3.0   TTR:   76.2 % (5.7 mo)   INR used for dosin.5 (3/26/2019)   Warfarin maintenance plan:   2.5 mg every Thu, Sat; 5 mg all other days   Weekly warfarin total:   30 mg   No change documented:   Fiorella Baires   Plan last modified:   Janice Hart [facility] (2018)   Next INR check:   3/29/2019   Priority:   Critical   Target end date:   Indefinite    Indications    Paroxysmal atrial fibrillation (CMS/HCC) [I48.0]             Anticoagulation Episode Summary     INR check location:       Preferred lab:       Send INR reminders to:    ANJU RANDALL [facility]    Comments:         Anticoagulation Care Providers     Provider Role Specialty Phone number    Mary Grace Culp MD Referring Cardiology [phone]    Janice Hart [facility] Responsible Pharmacy [phone]          Clinic Interview:  Patient Findings     Positives:   Change in medications        INR History:  Anticoagulation Monitoring 3/4/2019 3/11/2019 3/26/2019   INR 3.9 3.1 2.5   INR Date 3/4/2019 3/11/2019 3/26/2019   INR Goal 2.0-3.0 2.0-3.0 2.0-3.0   Trend Same Same Same   Last Week Total 30 mg 27.5 mg 30 mg   Next Week Total 27.5 mg 30 mg 30 mg   Sun 5 mg 5 mg -   Mon 2.5 mg (3/4) 5 mg -   Tue 5 mg 5 mg 5 mg   Wed 5 mg 5 mg 5 mg   Thu 2.5 mg 2.5 mg 2.5 mg   Fri 5 mg 5 mg -   Sat 2.5 mg 2.5 mg -   Visit Report - - -   Some recent data might be hidden       Plan:  1. INR is therapeutic today- see above in Anticoagulation Summary.   Will instruct Brittany Villeda to continue their warfarin regimen- see above in Anticoagulation Summary.  2. Follow up in 1 weeks.  3. Patient declines warfarin refills.  4. Verbal and written information provided. Patient expresses understanding and has no further questions at this time.    Fiorella Baires

## 2019-03-29 ENCOUNTER — HOSPITAL ENCOUNTER (OUTPATIENT)
Dept: CT IMAGING | Facility: HOSPITAL | Age: 84
Discharge: HOME | End: 2019-03-29
Admitting: NURSE PRACTITIONER

## 2019-03-29 ENCOUNTER — APPOINTMENT (OUTPATIENT)
Dept: PHARMACY | Facility: HOSPITAL | Age: 84
End: 2019-03-29

## 2019-03-29 ENCOUNTER — ANTICOAGULATION VISIT (OUTPATIENT)
Dept: PHARMACY | Facility: HOSPITAL | Age: 84
End: 2019-03-29

## 2019-03-29 DIAGNOSIS — I48.0 PAROXYSMAL ATRIAL FIBRILLATION (HCC): ICD-10-CM

## 2019-03-29 DIAGNOSIS — I71.20 THORACIC AORTIC ANEURYSM WITHOUT RUPTURE (HCC): ICD-10-CM

## 2019-03-29 LAB
INR PPP: 3.1 (ref 0.91–1.09)
PROTHROMBIN TIME: 37.5 SECONDS (ref 10–13.8)

## 2019-03-29 PROCEDURE — 36416 COLLJ CAPILLARY BLOOD SPEC: CPT

## 2019-03-29 PROCEDURE — 76377 3D RENDER W/INTRP POSTPROCES: CPT

## 2019-03-29 PROCEDURE — G0463 HOSPITAL OUTPT CLINIC VISIT: HCPCS

## 2019-03-29 PROCEDURE — 71250 CT THORAX DX C-: CPT

## 2019-03-29 PROCEDURE — 85610 PROTHROMBIN TIME: CPT

## 2019-03-29 RX ORDER — CLINDAMYCIN HYDROCHLORIDE 150 MG/1
150 CAPSULE ORAL 3 TIMES DAILY
COMMUNITY
End: 2019-04-12

## 2019-03-29 NOTE — PROGRESS NOTES
Anticoagulation Clinic Progress Note    Anticoagulation Summary  As of 3/29/2019    INR goal:   2.0-3.0   TTR:   76.4 % (5.8 mo)   INR used for dosing:   3.1! (3/29/2019)   Warfarin maintenance plan:   2.5 mg every Thu, Sat; 5 mg all other days   Weekly warfarin total:   30 mg   Plan last modified:   Janice Hart [facility] (9/27/2018)   Next INR check:   4/8/2019   Priority:   Critical   Target end date:   Indefinite    Indications    Paroxysmal atrial fibrillation (CMS/HCC) [I48.0]             Anticoagulation Episode Summary     INR check location:       Preferred lab:       Send INR reminders to:    ANJU RANDALL CLINICAL POOL    Comments:         Anticoagulation Care Providers     Provider Role Specialty Phone number    Mary Grace Culp MD Referring Cardiology [phone]    Janice Hart [facility] Responsible Pharmacy [phone]          Clinic Interview:  Patient Findings     Positives:   Change in medications    Negatives:   Signs/symptoms of thrombosis, Signs/symptoms of bleeding,   Laboratory test error suspected, Change in health, Change in alcohol use,   Change in activity, Upcoming invasive procedure, Emergency department   visit, Upcoming dental procedure, Missed doses, Extra doses, Change in   diet/appetite, Hospital admission, Bruising, Other complaints    Comments:   Continuing Cleocin po for foot infection til ~4/4 ir 4/5       Clinical Outcomes     Negatives:   Major bleeding event, Thromboembolic event,   Anticoagulation-related hospital admission, Anticoagulation-related ED   visit, Anticoagulation-related fatality    Comments:   Continuing Cleocin po for foot infection til ~4/4 ir 4/5         INR History:  Anticoagulation Monitoring 3/11/2019 3/26/2019 3/29/2019   INR 3.1 2.5 3.1   INR Date 3/11/2019 3/26/2019 3/29/2019   INR Goal 2.0-3.0 2.0-3.0 2.0-3.0   Trend Same Same Same   Last Week Total 27.5 mg 30 mg 30 mg   Next Week Total 30 mg 30 mg 27.5 mg   Sun 5 mg - 5 mg   Mon 5 mg - 5 mg   Tue 5 mg  5 mg 5 mg   Wed 5 mg 5 mg 5 mg   Thu 2.5 mg 2.5 mg 2.5 mg   Fri 5 mg - 2.5 mg (3/29); Otherwise 5 mg   Sat 2.5 mg - 2.5 mg   Visit Report - - -   Some recent data might be hidden       Plan:  1. INR is Supratherapeutic today- see above in Anticoagulation Summary.  Will instruct Brittany Villeda to Take only 2.5mg today, then continue same dosage regimen. Will come in for INR check 4/8. Note Continuing cleocin po now until ~ 4/4.    - see above in Anticoagulation Summary.  2. Follow up in 10 days  3. Patient declines warfarin refills.  4. Verbal and written information provided. Patient expresses understanding and has no further questions at this time.    Alissa Carmichael [facility]

## 2019-04-01 ENCOUNTER — TELEPHONE (OUTPATIENT)
Dept: CARDIOLOGY | Facility: CLINIC | Age: 84
End: 2019-04-01

## 2019-04-01 NOTE — TELEPHONE ENCOUNTER
----- Message from Tereza Jackson, NIKITA, APRN sent at 3/29/2019  4:32 PM EDT -----  Please let patient know that the dilation of her descending thoracic aorta is stable.  We will repeat a CT in 1 year.  She needs to keep follow-up appointment as scheduled with Dr. Culp for July or follow-up sooner if needed for any new, recurrent, or worsening symptoms or other issues/concerns.

## 2019-04-08 ENCOUNTER — ANTICOAGULATION VISIT (OUTPATIENT)
Dept: PHARMACY | Facility: HOSPITAL | Age: 84
End: 2019-04-08

## 2019-04-08 DIAGNOSIS — I48.0 PAROXYSMAL ATRIAL FIBRILLATION (HCC): ICD-10-CM

## 2019-04-08 LAB
INR PPP: 1.7 (ref 0.91–1.09)
PROTHROMBIN TIME: 20.7 SECONDS (ref 10–13.8)

## 2019-04-08 PROCEDURE — 85610 PROTHROMBIN TIME: CPT

## 2019-04-08 PROCEDURE — G0463 HOSPITAL OUTPT CLINIC VISIT: HCPCS

## 2019-04-08 PROCEDURE — 36416 COLLJ CAPILLARY BLOOD SPEC: CPT

## 2019-04-08 NOTE — PROGRESS NOTES
Anticoagulation Clinic Progress Note    Anticoagulation Summary  As of 2019    INR goal:   2.0-3.0   TTR:   76.1 % (6.1 mo)   INR used for dosin.7! (2019)   Warfarin maintenance plan:   2.5 mg every Thu, Sat; 5 mg all other days   Weekly warfarin total:   30 mg   Plan last modified:   Janice Hart [facility] (2018)   Next INR check:   4/15/2019   Priority:   Critical   Target end date:   Indefinite    Indications    Paroxysmal atrial fibrillation (CMS/HCC) [I48.0]             Anticoagulation Episode Summary     INR check location:       Preferred lab:       Send INR reminders to:    ANJU RANDALL CLINICAL POOL    Comments:         Anticoagulation Care Providers     Provider Role Specialty Phone number    Mary Grace Culp MD Referring Cardiology [phone]    Janice Hart [facility] Responsible Pharmacy [phone]          Clinic Interview:  Patient Findings     Negatives:   Signs/symptoms of thrombosis, Signs/symptoms of bleeding,   Laboratory test error suspected, Change in health, Change in alcohol use,   Change in activity, Upcoming invasive procedure, Emergency department   visit, Upcoming dental procedure, Missed doses, Extra doses, Change in   medications, Change in diet/appetite, Hospital admission, Bruising, Other   complaints      Clinical Outcomes     Negatives:   Major bleeding event, Thromboembolic event,   Anticoagulation-related hospital admission, Anticoagulation-related ED   visit, Anticoagulation-related fatality        INR History:  Anticoagulation Monitoring 3/26/2019 3/29/2019 2019   INR 2.5 3.1 1.7   INR Date 3/26/2019 3/29/2019 2019   INR Goal 2.0-3.0 2.0-3.0 2.0-3.0   Trend Same Same Same   Last Week Total 30 mg 30 mg 30 mg   Next Week Total 30 mg 27.5 mg 32.5 mg   Sun - 5 mg 5 mg   Mon - 5 mg 7.5 mg ()   Tue 5 mg 5 mg 5 mg   Wed 5 mg 5 mg 5 mg   Thu 2.5 mg 2.5 mg 2.5 mg   Fri - 2.5 mg (3/29); Otherwise 5 mg 5 mg   Sat - 2.5 mg 2.5 mg   Visit Report - - -   Some  recent data might be hidden       Plan:  1. INR is Subtherapeutic today- see above in Anticoagulation Summary.  Will instruct Brittany Villeda to Continue their warfarin regimen with a 7.5 mg boost dose today- see above in Anticoagulation Summary.  2. Follow up in 1 week  3. Patient declines warfarin refills.  4. Verbal and written information provided. Patient expresses understanding and has no further questions at this time.    Reyna Sanches RP

## 2019-04-12 ENCOUNTER — CONSULT (OUTPATIENT)
Dept: ORTHOPEDIC SURGERY | Facility: CLINIC | Age: 84
End: 2019-04-12

## 2019-04-12 VITALS — HEIGHT: 64 IN | WEIGHT: 225 LBS | BODY MASS INDEX: 38.41 KG/M2

## 2019-04-12 DIAGNOSIS — G89.29 CHRONIC RIGHT SHOULDER PAIN: Primary | ICD-10-CM

## 2019-04-12 DIAGNOSIS — M25.511 CHRONIC RIGHT SHOULDER PAIN: Primary | ICD-10-CM

## 2019-04-12 PROCEDURE — 73030 X-RAY EXAM OF SHOULDER: CPT | Performed by: ORTHOPAEDIC SURGERY

## 2019-04-12 PROCEDURE — 20610 DRAIN/INJ JOINT/BURSA W/O US: CPT | Performed by: ORTHOPAEDIC SURGERY

## 2019-04-12 PROCEDURE — 99214 OFFICE O/P EST MOD 30 MIN: CPT | Performed by: ORTHOPAEDIC SURGERY

## 2019-04-12 RX ADMIN — LIDOCAINE HYDROCHLORIDE 2 ML: 20 INJECTION, SOLUTION EPIDURAL; INFILTRATION; INTRACAUDAL; PERINEURAL at 09:53

## 2019-04-12 RX ADMIN — METHYLPREDNISOLONE ACETATE 80 MG: 80 INJECTION, SUSPENSION INTRA-ARTICULAR; INTRALESIONAL; INTRAMUSCULAR; SOFT TISSUE at 09:53

## 2019-04-12 NOTE — PROGRESS NOTES
Patient: Brittany Villeda    YOB: 1935    Medical Record Number: [id number]    Chief Complaints:  Referral for left shoulder pain, new complaint of right shoulder pain    History of Present Illness:     84 y.o. female patient who presents for evaluation of both shoulders.  The patient is referred to me for further evaluation by Dr. Lovell.  The patient has a long history of worsening bilateral shoulder pain and dysfunction.  She has only previously seen Dr. Lovell for the left shoulder.  Pain is described as moderate to severe, constant and aching.  She has difficulty with overhead activities, reaching and lifting.  She responded well to a previous injection in the left shoulder.  She has never had any treatment for the right shoulder.    Allergies:   Allergies   Allergen Reactions   • Codeine Hallucinations   • Amitriptyline Rash   • Carisoprodol-Aspirin-Codeine Unknown (See Comments)   • Tramadol Unknown (See Comments)     heart races    • Amoxicillin-Pot Clavulanate Rash   • Aspirin Unknown (See Comments)     Patient doesn't know why   • Bactrim [Sulfamethoxazole-Trimethoprim] Rash   • Iodinated Diagnostic Agents Rash   • Latex Rash   • Naproxen Rash   • Nsaids Unknown (See Comments)     unkknown   • Soma Compound With Codeine [Carisoprodol-Aspirin-Codeine] Rash   • Sulfa Antibiotics Rash       Home Medications:    Current Outpatient Medications:   •  Acetaminophen (PAIN RELIEVER PO), Take 1 tablet by mouth As Needed., Disp: , Rfl:   •  calcium carbonate (OS-ALIDA) 600 MG tablet, Take 600 mg by mouth Daily., Disp: , Rfl:   •  furosemide (LASIX) 20 MG tablet, Take 2 tablets by mouth Every Morning. 20mg in the PM, Disp: 180 tablet, Rfl: 1  •  gabapentin (NEURONTIN) 300 MG capsule, Take 1 capsule by mouth 4 (Four) Times a Day. (Patient taking differently: Take 300 mg by mouth 3 (Three) Times a Day.), Disp: 360 capsule, Rfl: 1  •  gentamicin (GARAMYCIN) 0.1 % cream, Apply  topically to the  appropriate area as directed 3 (Three) Times a Day., Disp: , Rfl:   •  mesalamine (LIALDA) 1.2 g EC tablet, Take 4 tablets by mouth Daily., Disp: 360 tablet, Rfl: 0  •  mometasone (ELOCON) 0.1 % ointment, Apply 1 application topically to the appropriate area as directed 2 (Two) Times a Day As Needed. As needed, Disp: , Rfl:   •  omeprazole (priLOSEC) 20 MG capsule, TAKE 1 CAPSULE EVERY DAY, Disp: 90 capsule, Rfl: 0  •  vitamin B-12 (CYANOCOBALAMIN) 1000 MCG tablet, Take 1,000 mcg by mouth Daily., Disp: , Rfl:   •  warfarin (COUMADIN) 5 MG tablet, TAKE 1 TABLET EVERY DAY, Disp: 90 tablet, Rfl: 1    Past Medical History:   Diagnosis Date   • Anemia    • Arrhythmia    • Arthritis    • Asthma    • Bradycardia    • Chest pain    • Colitis    • COPD (chronic obstructive pulmonary disease) (CMS/HCC)    • Diastolic dysfunction    • Essential hypertension 5/12/2016   • GERD (gastroesophageal reflux disease)    • Heart block    • Hypertension    • Hypertensive heart disease    • Hyperthyroidism    • Kidney stone    • Low back pain    • MGUS (monoclonal gammopathy of unknown significance)    • Nephrolithiasis    • Obesity    • Paroxysmal atrial fibrillation (CMS/HCC)    • Peptic ulceration    • Pulmonary hypertension (CMS/HCC)    • Sleep apnea    • Ventricular tachycardia (CMS/HCC)     nonsustained   • Vertigo        Past Surgical History:   Procedure Laterality Date   • BACK SURGERY      lumbar fusion   • CARDIAC ELECTROPHYSIOLOGY PROCEDURE N/A 11/7/2018    Procedure: Pacemaker DC new   [place];  Surgeon: Jesus Granda MD;  Location: [facility] INVASIVE LOCATION;  Service: Cardiology   • CHOLECYSTECTOMY     • COLONOSCOPY  06/16/2014    colitis, cryptitis,  tics, NBIH, TA w/low grade dysplasia   • HEMORRHOIDECTOMY     • HYSTERECTOMY     • KNEE ARTHROPLASTY     • MYOMECTOMY     • SHOULDER SURGERY     • SINUS SURGERY     • TOE NAIL AMPUTATION  03/04/2019   • TONSILLECTOMY         Social History     Occupational History  "  • Occupation: Caregiver     Employer: RETIRED     Comment: worked for [employer]   Tobacco Use   • Smoking status: Former Smoker     Packs/day: 1.50     Years: 10.00     Pack years: 15.00     Start date:      Last attempt to quit:      Years since quittin.3   • Smokeless tobacco: Never Used   • Tobacco comment: QUIT SMOKING    Substance and Sexual Activity   • Alcohol use: No     Comment: Daily caffeine use - one cup of coffee   • Drug use: No   • Sexual activity: No      Social History     Social History Narrative   • Not on file       Family History   Problem Relation Age of Onset   • Diabetes Mother    • Breast cancer Sister    • Kidney cancer Sister    • Heart disease Sister    • Prostate cancer Brother    • Prostate cancer Brother    • Prostate cancer Brother        Review of Systems:      Constitutional: Denies fever, shaking or chills   Eyes: Denies change in visual acuity   HEENT: Denies nasal congestion or sore throat   Respiratory: Denies cough or shortness of breath   Cardiovascular: Denies chest pain or edema  Endocrine: Denies tremors, palpitations, intolerance of heat or cold, polyuria, polydipsia.  GI: Denies abdominal pain, nausea, vomiting, bloody stools or diarrhea  : Denies frequency, urgency, incontinence, retention, or nocturia.  Musculoskeletal: Denies numbness, tingling or loss of motor function except as above  Integument: Denies rash, lesion or ulceration   Neurologic: Denies headache or focal weakness, deficits  Heme: Denies spontaneous or excessive bleeding, epistaxis, hematuria, melena, fatigue, enlarged or tender lymph nodes.      All other pertinent positives and negatives as noted above in HPI.    Physical Exam: 84 y.o. female    Vitals:    19 0907   Weight: 102 kg (225 lb)   Height: 162.6 cm (64\")     General:  Patient is awake and alert.  Appears in no acute distress or discomfort.    Psych:  Affect and demeanor are appropriate.    Eyes:  " Conjunctiva and sclera appear grossly normal.  Eyes track well and EOM seem to be intact.    Ears:  No gross abnormalities.  Hearing adequate for the exam.    Cardiovascular:  Regular rate and rhythm.    Lungs:  Good chest expansion.  Breathing unlabored.    Extremities: Right shoulder is examined. Skin is benign.  No obvious gross abnormalities.  No palpable masses or adenopathy.  Moderate tenderness noted over anterior glenohumeral joint and rotator interval.  Motion is limited and uncomfortable--forward elevation roughly 140 degrees, external rotation 25 degrees, internal rotation to her pocket.  Passive motion is a little better but still very limited.  No instability.  Seems to have overall good strength in her rotator cuff and deltoid but she does have discomfort which limits the exam somewhat.  Good motor and sensory function in lower arm and hand.  Brisk capillary refill in hand.  Palpable radial pulse.  Good skin turgor.    Left shoulder exam is similar.  Motion is more limited than that on the right.  Good motor and sensory function distally with a palpable radial pulse.    Imaging:  Previous x-rays from February including AP and orthogonal views of the left shoulder are reviewed.  The x-rays show moderate to severe osteoarthritis with bone-on-bone degeneration, osteophyte formation and subchondral sclerosis.  AP, scapular Y and axillary views of the right shoulder are ordered and reviewed today.  No comparison films are available.  The x-rays show moderate glenohumeral osteoarthritis with joint space narrowing, osteophyte formation and subchondral sclerosis.    Assessment/Plan:  Bilateral shoulder osteoarthritis    We had a long discussion about conservative and surgical treatment options.  We talked about an arthroplasty and all that that would potentially entail in terms of surgery itself, rehabilitation and recovery.  The patient acknowledged understanding this information.  Patient has stated that she  would prefer to continue nonsurgical care indefinitely.  The risks, benefits and alternatives to injections for both shoulders were discussed.  She says the left is doing fairly well at this time and she would prefer just to get the right shoulder injected.  The risks were discussed and she consented.  Forward, she will follow-up as needed.    Wes Roldan MD    04/12/2019      Large Joint Arthrocentesis: R glenohumeral  Date/Time: 4/12/2019 9:53 AM  Consent given by: patient  Site marked: site marked  Timeout: Immediately prior to procedure a time out was called to verify the correct patient, procedure, equipment, support staff and site/side marked as required   Supporting Documentation  Indications: pain   Procedure Details  Location: shoulder - R glenohumeral  Preparation: Patient was prepped and draped in the usual sterile fashion  Needle gauge: 21 G.  Approach: posterior  Medications administered: 2 mL lidocaine PF 2% 2 %; 80 mg methylPREDNISolone acetate 80 MG/ML  Patient tolerance: patient tolerated the procedure well with no immediate complications

## 2019-04-14 RX ORDER — LIDOCAINE HYDROCHLORIDE 20 MG/ML
2 INJECTION, SOLUTION EPIDURAL; INFILTRATION; INTRACAUDAL; PERINEURAL
Status: COMPLETED | OUTPATIENT
Start: 2019-04-12 | End: 2019-04-12

## 2019-04-14 RX ORDER — METHYLPREDNISOLONE ACETATE 80 MG/ML
80 INJECTION, SUSPENSION INTRA-ARTICULAR; INTRALESIONAL; INTRAMUSCULAR; SOFT TISSUE
Status: COMPLETED | OUTPATIENT
Start: 2019-04-12 | End: 2019-04-12

## 2019-04-15 ENCOUNTER — ANTICOAGULATION VISIT (OUTPATIENT)
Dept: PHARMACY | Facility: HOSPITAL | Age: 84
End: 2019-04-15

## 2019-04-15 DIAGNOSIS — I48.0 PAROXYSMAL ATRIAL FIBRILLATION (HCC): ICD-10-CM

## 2019-04-15 LAB
INR PPP: 2.2 (ref 0.91–1.09)
PROTHROMBIN TIME: 26 SECONDS (ref 10–13.8)

## 2019-04-15 PROCEDURE — 85610 PROTHROMBIN TIME: CPT

## 2019-04-15 PROCEDURE — 36416 COLLJ CAPILLARY BLOOD SPEC: CPT

## 2019-04-15 NOTE — PROGRESS NOTES
Anticoagulation Clinic Progress Note    Anticoagulation Summary  As of 4/15/2019    INR goal:   2.0-3.0   TTR:   74.8 % (6.3 mo)   INR used for dosin.2 (4/15/2019)   Warfarin maintenance plan:   2.5 mg every Thu, Sat; 5 mg all other days   Weekly warfarin total:   30 mg   No change documented:   Cherelle Montague   Plan last modified:   [facility] (2018)   Next INR check:   2019   Priority:   High   Target end date:   Indefinite    Indications    Paroxysmal atrial fibrillation (CMS/HCC) [I48.0]             Anticoagulation Episode Summary     INR check location:       Preferred lab:       Send INR reminders to:    [facility]    Comments:         Anticoagulation Care Providers     Provider Role Specialty Phone number    Mary Grace Culp MD Referring Cardiology [phone]    [facility] [phone]          Clinic Interview:  Patient Findings     Negatives:   Signs/symptoms of thrombosis, Signs/symptoms of bleeding,   Laboratory test error suspected, Change in health, Change in alcohol use,   Change in activity, Upcoming invasive procedure, Emergency department   visit, Upcoming dental procedure, Missed doses, Extra doses, Change in   medications, Change in diet/appetite, Hospital admission, Bruising, Other   complaints      Clinical Outcomes     Negatives:   Major bleeding event, Thromboembolic event,   Anticoagulation-related hospital admission, Anticoagulation-related ED   visit, Anticoagulation-related fatality        INR History:  Anticoagulation Monitoring 3/29/2019 2019 4/15/2019   INR 3.1 1.7 2.2   INR Date 3/29/2019 2019 4/15/2019   INR Goal 2.0-3.0 2.0-3.0 2.0-3.0   Trend Same Same Same   Last Week Total 30 mg 30 mg 32.5 mg   Next Week Total 27.5 mg 32.5 mg 30 mg   Sun 5 mg 5 mg 5 mg   Mon 5 mg 7.5 mg () 5 mg   Tue 5 mg 5 mg 5 mg   Wed 5 mg 5 mg 5 mg   Thu 2.5 mg 2.5 mg 2.5 mg   Fri 2.5 mg (3/29); Otherwise 5 mg 5 mg 5 mg    Sat 2.5 mg 2.5 mg 2.5 mg   Visit Report - - -   Some recent data might be hidden       Plan:  1. INR is therapeutic today- see above in Anticoagulation Summary.   Will instruct Brittanyyuri Shaferg to continue their warfarin regimen- see above in Anticoagulation Summary.  2. Follow up in 2 weeks.  3. Patient declines warfarin refills.  4. Verbal and written information provided. Patient expresses understanding and has no further questions at this time.    Cherelle Montague

## 2019-04-29 ENCOUNTER — ANTICOAGULATION VISIT (OUTPATIENT)
Dept: PHARMACY | Facility: HOSPITAL | Age: 84
End: 2019-04-29

## 2019-04-29 DIAGNOSIS — I48.0 PAROXYSMAL ATRIAL FIBRILLATION (HCC): ICD-10-CM

## 2019-04-29 LAB
INR PPP: 1.9 (ref 0.91–1.09)
PROTHROMBIN TIME: 23.3 SECONDS (ref 10–13.8)

## 2019-04-29 PROCEDURE — 85610 PROTHROMBIN TIME: CPT

## 2019-04-29 PROCEDURE — 36416 COLLJ CAPILLARY BLOOD SPEC: CPT

## 2019-04-29 NOTE — PROGRESS NOTES
Anticoagulation Clinic Progress Note    Anticoagulation Summary  As of 2019    INR goal:   2.0-3.0   TTR:   74.2 % (6.8 mo)   INR used for dosin.9! (2019)   Warfarin maintenance plan:   2.5 mg every Thu, Sat; 5 mg all other days   Weekly warfarin total:   30 mg   No change documented:   Seema Lambert   Plan last modified:   [facility] (2018)   Next INR check:   2019   Priority:   High   Target end date:   Indefinite    Indications    Paroxysmal atrial fibrillation (CMS/HCC) [I48.0]             Anticoagulation Episode Summary     INR check location:       Preferred lab:       Send INR reminders to:    [facility] POOL    Comments:         Anticoagulation Care Providers     Provider Role Specialty Phone number    Mary Grace Culp MD Referring Cardiology [phone]    [facility] [phone]          Clinic Interview:  Patient Findings     Negatives:   Signs/symptoms of thrombosis, Signs/symptoms of bleeding,   Laboratory test error suspected, Change in health, Change in alcohol use,   Change in activity, Upcoming invasive procedure, Emergency department   visit, Upcoming dental procedure, Missed doses, Extra doses, Change in   medications, Change in diet/appetite, Hospital admission, Bruising, Other   complaints      Clinical Outcomes     Negatives:   Major bleeding event, Thromboembolic event,   Anticoagulation-related hospital admission, Anticoagulation-related ED   visit, Anticoagulation-related fatality        INR History:  Anticoagulation Monitoring 2019 4/15/2019 2019   INR 1.7 2.2 1.9   INR Date 2019 4/15/2019 2019   INR Goal 2.0-3.0 2.0-3.0 2.0-3.0   Trend Same Same Same   Last Week Total 30 mg 32.5 mg 30 mg   Next Week Total 32.5 mg 30 mg 30 mg   Sun 5 mg 5 mg 5 mg   Mon 7.5 mg () 5 mg 5 mg   Tue 5 mg 5 mg 5 mg   Wed 5 mg 5 mg 5 mg   Thu 2.5 mg 2.5 mg 2.5 mg   Fri 5 mg 5 mg 5 mg   Sat 2.5 mg 2.5 mg 2.5 mg    Visit Report - - -   Some recent data might be hidden       Plan:  1. INR is out of range- see above in Anticoagulation Summary.   Will instruct Brittany Villeda to Continue  their warfarin regimen- see above in Anticoagulation Summary.  2. Follow up in 3 weeks.   3. Patient declines warfarin refills.  4. Verbal and written information provided. Patient expresses understanding and has no further questions at this time.    Seema Lambert

## 2019-04-30 RX ORDER — OMEPRAZOLE 20 MG/1
CAPSULE, DELAYED RELEASE ORAL
Qty: 90 CAPSULE | Refills: 0 | Status: SHIPPED | OUTPATIENT
Start: 2019-04-30 | End: 2019-08-26 | Stop reason: SDUPTHER

## 2019-05-04 ENCOUNTER — APPOINTMENT (OUTPATIENT)
Dept: CT IMAGING | Facility: HOSPITAL | Age: 84
End: 2019-05-04

## 2019-05-04 ENCOUNTER — HOSPITAL ENCOUNTER (EMERGENCY)
Facility: HOSPITAL | Age: 84
Discharge: HOME | End: 2019-05-04
Attending: EMERGENCY MEDICINE | Admitting: EMERGENCY MEDICINE

## 2019-05-04 ENCOUNTER — APPOINTMENT (OUTPATIENT)
Dept: GENERAL RADIOLOGY | Facility: HOSPITAL | Age: 84
End: 2019-05-04

## 2019-05-04 VITALS
BODY MASS INDEX: 38.95 KG/M2 | HEIGHT: 64 IN | SYSTOLIC BLOOD PRESSURE: 129 MMHG | WEIGHT: 228.13 LBS | TEMPERATURE: 97.8 F | RESPIRATION RATE: 16 BRPM | OXYGEN SATURATION: 96 % | HEART RATE: 71 BPM | DIASTOLIC BLOOD PRESSURE: 66 MMHG

## 2019-05-04 DIAGNOSIS — R07.9 CHEST PAIN, UNSPECIFIED TYPE: Primary | ICD-10-CM

## 2019-05-04 LAB
ALBUMIN SERPL-MCNC: 3.8 G/DL (ref 3.5–5.2)
ALBUMIN/GLOB SERPL: 1.2 G/DL
ALP SERPL-CCNC: 60 U/L (ref 39–117)
ALT SERPL W P-5'-P-CCNC: 11 U/L (ref 1–33)
ANION GAP SERPL CALCULATED.3IONS-SCNC: 8.7 MMOL/L
AST SERPL-CCNC: 15 U/L (ref 1–32)
BASOPHILS # BLD AUTO: 0.01 10*3/MM3 (ref 0–0.2)
BASOPHILS NFR BLD AUTO: 0.3 % (ref 0–1.5)
BILIRUB SERPL-MCNC: 1.1 MG/DL (ref 0.2–1.2)
BUN BLD-MCNC: 16 MG/DL (ref 8–23)
BUN/CREAT SERPL: 18 (ref 7–25)
CALCIUM SPEC-SCNC: 10 MG/DL (ref 8.6–10.5)
CHLORIDE SERPL-SCNC: 106 MMOL/L (ref 98–107)
CO2 SERPL-SCNC: 28.3 MMOL/L (ref 22–29)
CREAT BLD-MCNC: 0.89 MG/DL (ref 0.57–1)
DEPRECATED RDW RBC AUTO: 54.9 FL (ref 37–54)
EOSINOPHIL # BLD AUTO: 0 10*3/MM3 (ref 0–0.4)
EOSINOPHIL NFR BLD AUTO: 0 % (ref 0.3–6.2)
ERYTHROCYTE [DISTWIDTH] IN BLOOD BY AUTOMATED COUNT: 14.6 % (ref 12.3–15.4)
GFR SERPL CREATININE-BSD FRML MDRD: 73 ML/MIN/1.73
GLOBULIN UR ELPH-MCNC: 3.3 GM/DL
GLUCOSE BLD-MCNC: 97 MG/DL (ref 65–99)
HCT VFR BLD AUTO: 36.4 % (ref 34–46.6)
HGB BLD-MCNC: 11.3 G/DL (ref 12–15.9)
IMM GRANULOCYTES # BLD AUTO: 0.01 10*3/MM3 (ref 0–0.05)
IMM GRANULOCYTES NFR BLD AUTO: 0.3 % (ref 0–0.5)
INR PPP: 2.73 (ref 0.9–1.1)
LIPASE SERPL-CCNC: 19 U/L (ref 13–60)
LYMPHOCYTES # BLD AUTO: 1.41 10*3/MM3 (ref 0.7–3.1)
LYMPHOCYTES NFR BLD AUTO: 45.9 % (ref 19.6–45.3)
MCH RBC QN AUTO: 32.1 PG (ref 26.6–33)
MCHC RBC AUTO-ENTMCNC: 31 G/DL (ref 31.5–35.7)
MCV RBC AUTO: 103.4 FL (ref 79–97)
MONOCYTES # BLD AUTO: 0.34 10*3/MM3 (ref 0.1–0.9)
MONOCYTES NFR BLD AUTO: 11.1 % (ref 5–12)
NEUTROPHILS # BLD AUTO: 1.3 10*3/MM3 (ref 1.7–7)
NEUTROPHILS NFR BLD AUTO: 42.4 % (ref 42.7–76)
NRBC BLD AUTO-RTO: 0 /100 WBC (ref 0–0.2)
NT-PROBNP SERPL-MCNC: 391.5 PG/ML (ref 5–1800)
PLATELET # BLD AUTO: 152 10*3/MM3 (ref 140–450)
PMV BLD AUTO: 8.8 FL (ref 6–12)
POTASSIUM BLD-SCNC: 4.3 MMOL/L (ref 3.5–5.2)
PROT SERPL-MCNC: 7.1 G/DL (ref 6–8.5)
PROTHROMBIN TIME: 28.6 SECONDS (ref 11.7–14.2)
RBC # BLD AUTO: 3.52 10*6/MM3 (ref 3.77–5.28)
SODIUM BLD-SCNC: 143 MMOL/L (ref 136–145)
TROPONIN T SERPL-MCNC: <0.01 NG/ML (ref 0–0.03)
TROPONIN T SERPL-MCNC: <0.01 NG/ML (ref 0–0.03)
WBC NRBC COR # BLD: 3.07 10*3/MM3 (ref 3.4–10.8)

## 2019-05-04 PROCEDURE — 84484 ASSAY OF TROPONIN QUANT: CPT | Performed by: EMERGENCY MEDICINE

## 2019-05-04 PROCEDURE — 71275 CT ANGIOGRAPHY CHEST: CPT

## 2019-05-04 PROCEDURE — 85610 PROTHROMBIN TIME: CPT | Performed by: EMERGENCY MEDICINE

## 2019-05-04 PROCEDURE — 83690 ASSAY OF LIPASE: CPT | Performed by: EMERGENCY MEDICINE

## 2019-05-04 PROCEDURE — 99284 EMERGENCY DEPT VISIT MOD MDM: CPT

## 2019-05-04 PROCEDURE — 25010000002 DIPHENHYDRAMINE PER 50 MG: Performed by: EMERGENCY MEDICINE

## 2019-05-04 PROCEDURE — 93010 ELECTROCARDIOGRAM REPORT: CPT | Performed by: INTERNAL MEDICINE

## 2019-05-04 PROCEDURE — 93005 ELECTROCARDIOGRAM TRACING: CPT | Performed by: EMERGENCY MEDICINE

## 2019-05-04 PROCEDURE — 71046 X-RAY EXAM CHEST 2 VIEWS: CPT

## 2019-05-04 PROCEDURE — 25010000002 IOPAMIDOL 61 % SOLUTION: Performed by: EMERGENCY MEDICINE

## 2019-05-04 PROCEDURE — 74174 CTA ABD&PLVS W/CONTRAST: CPT

## 2019-05-04 PROCEDURE — 96375 TX/PRO/DX INJ NEW DRUG ADDON: CPT

## 2019-05-04 PROCEDURE — 83880 ASSAY OF NATRIURETIC PEPTIDE: CPT | Performed by: EMERGENCY MEDICINE

## 2019-05-04 PROCEDURE — 96374 THER/PROPH/DIAG INJ IV PUSH: CPT

## 2019-05-04 PROCEDURE — 85025 COMPLETE CBC W/AUTO DIFF WBC: CPT | Performed by: EMERGENCY MEDICINE

## 2019-05-04 PROCEDURE — 25010000002 METHYLPREDNISOLONE PER 125 MG: Performed by: EMERGENCY MEDICINE

## 2019-05-04 PROCEDURE — 80053 COMPREHEN METABOLIC PANEL: CPT | Performed by: EMERGENCY MEDICINE

## 2019-05-04 RX ORDER — NITROGLYCERIN 0.4 MG/1
0.4 TABLET SUBLINGUAL ONCE
Status: COMPLETED | OUTPATIENT
Start: 2019-05-04 | End: 2019-05-04

## 2019-05-04 RX ORDER — DIPHENHYDRAMINE HYDROCHLORIDE 50 MG/ML
25 INJECTION INTRAMUSCULAR; INTRAVENOUS ONCE
Status: COMPLETED | OUTPATIENT
Start: 2019-05-04 | End: 2019-05-04

## 2019-05-04 RX ORDER — SODIUM CHLORIDE 0.9 % (FLUSH) 0.9 %
10 SYRINGE (ML) INJECTION AS NEEDED
Status: DISCONTINUED | OUTPATIENT
Start: 2019-05-04 | End: 2019-05-04 | Stop reason: HOSPADM

## 2019-05-04 RX ORDER — METHYLPREDNISOLONE SODIUM SUCCINATE 125 MG/2ML
125 INJECTION, POWDER, LYOPHILIZED, FOR SOLUTION INTRAMUSCULAR; INTRAVENOUS ONCE
Status: COMPLETED | OUTPATIENT
Start: 2019-05-04 | End: 2019-05-04

## 2019-05-04 RX ADMIN — IOPAMIDOL 95 ML: 612 INJECTION, SOLUTION INTRAVENOUS at 15:22

## 2019-05-04 RX ADMIN — METHYLPREDNISOLONE SODIUM SUCCINATE 125 MG: 125 INJECTION, POWDER, FOR SOLUTION INTRAMUSCULAR; INTRAVENOUS at 13:59

## 2019-05-04 RX ADMIN — DIPHENHYDRAMINE HYDROCHLORIDE 25 MG: 50 INJECTION, SOLUTION INTRAMUSCULAR; INTRAVENOUS at 14:00

## 2019-05-04 RX ADMIN — NITROGLYCERIN 0.4 MG: 0.4 TABLET, ORALLY DISINTEGRATING SUBLINGUAL at 12:39

## 2019-05-04 NOTE — ED PROVIDER NOTES
" EMERGENCY DEPARTMENT ENCOUNTER    Room Number:  09/09  Date seen:  5/4/2019  Time seen: 12:21 PM  PCP: Mega Esparza MD  Historian: patient      HPI:  Chief Complaint: chest pain  Context: Brittany Villeda is a 84 y.o. female who presents to the ED c/o intermittent mid-sternal chest tightness onset about an hour PTA. The pain is \"like eating something but it goes down the wrong way\" and she states that the pain radiates down her back. Pt was sitting down when the pain hit and states that she ate about two hours prior to the onset of her pain. She reports accompanying diaphoresis and lightheadedness. Denies SOA, nausea, vomiting, Hx of HTN, Hx of kidney problems, chills, fever, cough, and any other complaints at this time. Pt has a pacemaker in place.     Pain Location: midsternal  Radiation: to the mid back  Quality: \"like eating something but it goes down the wrong way\"  Intensity/Severity: moderate  Duration: about an hour  Onset quality: sudden  Timing: intermittent  Progression: unchanged  Aggravating Factors: none  Alleviating Factors: none  Previous Episodes: none  Treatment before arrival: none  Associated Symptoms: lightheadedness and diaphoresis      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   • Leukopenia 09/12/2016   • MGUS (monoclonal gammopathy of unknown significance) 09/16/2016   • 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   • Lichen sclerosus 08/23/2017   • Heart " block 10/30/2018   • Sleep-related hypoxia 12/22/2018   • Bradycardia 12/29/2018   • Shoulder arthritis 01/28/2019   • History of atrial fibrillation 03/19/2019   • Pacemaker 03/19/2019     Resolved Ambulatory Problems     Diagnosis Date Noted   • Abnormal weight loss 05/12/2016   • Tachycardia 05/12/2016   • Nausea 05/12/2016   • Hyperthyroidism 05/12/2016   • Fatigue 05/12/2016   • Dizziness 05/12/2016   • Diarrhea 05/12/2016   • Abnormal thyroid stimulating hormone (TSH) level 05/12/2016   • Abdominal pain 05/12/2016   • Abnormal EKG 06/30/2016   • Precordial pain 12/24/2017     Past Medical History:   Diagnosis Date   • Anemia    • Arrhythmia    • Arthritis    • Asthma    • Bradycardia    • Chest pain    • Colitis    • COPD (chronic obstructive pulmonary disease) (CMS/HCC)    • Diastolic dysfunction    • Essential hypertension 5/12/2016   • GERD (gastroesophageal reflux disease)    • Heart block    • Hypertension    • Hypertensive heart disease    • Hyperthyroidism    • Kidney stone    • Low back pain    • MGUS (monoclonal gammopathy of unknown significance)    • Nephrolithiasis    • Obesity    • Paroxysmal atrial fibrillation (CMS/HCC)    • Peptic ulceration    • Pulmonary hypertension (CMS/HCC)    • Sleep apnea    • Ventricular tachycardia (CMS/HCC)    • Vertigo          PAST SURGICAL HISTORY  Past Surgical History:   Procedure Laterality Date   • BACK SURGERY      lumbar fusion   • CARDIAC ELECTROPHYSIOLOGY PROCEDURE N/A 11/7/2018    Procedure: Pacemaker DC new   [place];  Surgeon: Jesus Granda MD;  Location: [facility] INVASIVE LOCATION;  Service: Cardiology   • CHOLECYSTECTOMY     • COLONOSCOPY  06/16/2014    colitis, cryptitis,  tics, NBIH, TA w/low grade dysplasia   • HEMORRHOIDECTOMY     • HYSTERECTOMY     • KNEE ARTHROPLASTY     • MYOMECTOMY     • SHOULDER SURGERY     • SINUS SURGERY     • TOE NAIL AMPUTATION  03/04/2019   • TONSILLECTOMY           FAMILY HISTORY  Family History   Problem  "Relation Age of Onset   • Diabetes Mother    • Breast cancer Sister    • Kidney cancer Sister    • Heart disease Sister    • Prostate cancer Brother    • Prostate cancer Brother    • Prostate cancer Brother          SOCIAL HISTORY  Social History     Socioeconomic History   • Marital status:      Spouse name: Not on file   • Number of children: 10   • Years of education: High School   • Highest education level: Not on file   Occupational History   • Occupation: Caregiver     Employer: RETIRED     Comment: worked for [employer]   Tobacco Use   • Smoking status: Former Smoker     Packs/day: 1.50     Years: 10.00     Pack years: 15.00     Start date:      Last attempt to quit:      Years since quittin.3   • Smokeless tobacco: Never Used   • Tobacco comment: QUIT SMOKING    Substance and Sexual Activity   • Alcohol use: No     Comment: Daily caffeine use - one cup of coffee   • Drug use: No   • Sexual activity: No         ALLERGIES  Codeine; Amitriptyline; Carisoprodol-aspirin-codeine; Tramadol; Amoxicillin-pot clavulanate; Aspirin; Bactrim [sulfamethoxazole-trimethoprim]; Iodinated diagnostic agents; Latex; Naproxen; Nsaids; Soma compound with codeine [carisoprodol-aspirin-codeine]; and Sulfa antibiotics        REVIEW OF SYSTEMS  Review of Systems   Constitutional: Positive for diaphoresis. Negative for fever.   HENT: Negative for congestion.    Eyes: Negative for visual disturbance.   Respiratory: Negative for shortness of breath.    Cardiovascular: Positive for chest pain (midsternal, intermittent, \"like eating something and having it go down the wrong way\"). Negative for palpitations.   Gastrointestinal: Negative for blood in stool and vomiting.   Endocrine: Negative for polyuria.   Genitourinary: Negative for flank pain.   Musculoskeletal: Negative for joint swelling.   Skin: Negative for wound.   Neurological: Positive for light-headedness. Negative for seizures. "   Hematological: Negative for adenopathy.   Psychiatric/Behavioral: Negative for sleep disturbance.        PHYSICAL EXAM  ED Triage Vitals [05/04/19 1155]   Temp Heart Rate Resp BP SpO2   -- 90 16 138/76 98 %      Temp src Heart Rate Source Patient Position BP Location FiO2 (%)   -- -- -- -- --       GENERAL: no acute distress  HENT: nares patent  EYES: no scleral icterus  CV: regular rhythm, normal rate, mild tenderness to the lower third of the sternum, 2+ radial pulses bilaterally  RESPIRATORY: normal effort, CTAB  ABDOMEN: soft, non-tender throughout  MUSCULOSKELETAL: no deformity, no BLE edema  NEURO: alert, moves all extremities, follows commands  SKIN: warm, dry    Vital signs and nursing notes reviewed.      LAB RESULTS  Recent Results (from the past 24 hour(s))   Protime-INR    Collection Time: 05/04/19 12:39 PM   Result Value Ref Range    Protime 28.6 (H) 11.7 - 14.2 Seconds    INR 2.73 (H) 0.90 - 1.10   Comprehensive Metabolic Panel    Collection Time: 05/04/19 12:39 PM   Result Value Ref Range    Glucose 97 65 - 99 mg/dL    BUN 16 8 - 23 mg/dL    Creatinine 0.89 0.57 - 1.00 mg/dL    Sodium 143 136 - 145 mmol/L    Potassium 4.3 3.5 - 5.2 mmol/L    Chloride 106 98 - 107 mmol/L    CO2 28.3 22.0 - 29.0 mmol/L    Calcium 10.0 8.6 - 10.5 mg/dL    Total Protein 7.1 6.0 - 8.5 g/dL    Albumin 3.80 3.50 - 5.20 g/dL    ALT (SGPT) 11 1 - 33 U/L    AST (SGOT) 15 1 - 32 U/L    Alkaline Phosphatase 60 39 - 117 U/L    Total Bilirubin 1.1 0.2 - 1.2 mg/dL    eGFR  African Amer 73 >60 mL/min/1.73    Globulin 3.3 gm/dL    A/G Ratio 1.2 g/dL    BUN/Creatinine Ratio 18.0 7.0 - 25.0    Anion Gap 8.7 mmol/L   Lipase    Collection Time: 05/04/19 12:39 PM   Result Value Ref Range    Lipase 19 13 - 60 U/L   BNP    Collection Time: 05/04/19 12:39 PM   Result Value Ref Range    proBNP 391.5 5.0-1,800.0 pg/mL   Troponin    Collection Time: 05/04/19 12:39 PM   Result Value Ref Range    Troponin T <0.010 0.000 - 0.030 ng/mL   CBC Auto  Differential    Collection Time: 05/04/19 12:39 PM   Result Value Ref Range    WBC 3.07 (L) 3.40 - 10.80 10*3/mm3    RBC 3.52 (L) 3.77 - 5.28 10*6/mm3    Hemoglobin 11.3 (L) 12.0 - 15.9 g/dL    Hematocrit 36.4 34.0 - 46.6 %    .4 (H) 79.0 - 97.0 fL    MCH 32.1 26.6 - 33.0 pg    MCHC 31.0 (L) 31.5 - 35.7 g/dL    RDW 14.6 12.3 - 15.4 %    RDW-SD 54.9 (H) 37.0 - 54.0 fl    MPV 8.8 6.0 - 12.0 fL    Platelets 152 140 - 450 10*3/mm3    Neutrophil % 42.4 (L) 42.7 - 76.0 %    Lymphocyte % 45.9 (H) 19.6 - 45.3 %    Monocyte % 11.1 5.0 - 12.0 %    Eosinophil % 0.0 (L) 0.3 - 6.2 %    Basophil % 0.3 0.0 - 1.5 %    Immature Grans % 0.3 0.0 - 0.5 %    Neutrophils, Absolute 1.30 (L) 1.70 - 7.00 10*3/mm3    Lymphocytes, Absolute 1.41 0.70 - 3.10 10*3/mm3    Monocytes, Absolute 0.34 0.10 - 0.90 10*3/mm3    Eosinophils, Absolute 0.00 0.00 - 0.40 10*3/mm3    Basophils, Absolute 0.01 0.00 - 0.20 10*3/mm3    Immature Grans, Absolute 0.01 0.00 - 0.05 10*3/mm3    nRBC 0.0 0.0 - 0.2 /100 WBC   Troponin    Collection Time: 05/04/19  2:48 PM   Result Value Ref Range    Troponin T <0.010 0.000 - 0.030 ng/mL       Ordered the above labs and reviewed the results.      RADIOLOGY  Xr Chest 2 View    Result Date: 5/4/2019  TWO-VIEW CHEST  HISTORY: Chest pain.  FINDINGS: There is mild elevation of the right hemidiaphragm unchanged from previous studies. The lungs are clear except for a calcified granuloma in the left midlung. There is mild cardiomegaly with a pacemaker in place and no acute abnormality is seen. There is improved lung expansion when compared to the study dated 11/07/2018.        Ordered the above noted radiological studies. Reviewed by me in PACS.  Spoke with Dr. Mcclendon (radiologist) regarding CTA chest and abd scan results.      PROCEDURES  Procedures    EKG:           EKG time: 1207  Rhythm/Rate: A-fib rate of 75  P waves and [place]: n/a  QRS, axis: paced-complex   ST and T waves: no acute ischemic changes     Interpreted  Contemporaneously by me, independently viewed  Unchanged compared to prior 11/8/18    HEART SCORE    History Moderately suspicious (1)  ECG Normal (0)  Age > or = 65 (2)  Risk factors No risk factors (0)  Troponin < or = Normal limit (0)    This patient's HEART score is 3    HEART Score Key:  Scores 0-3: 0.9-1.7% risk of adverse cardiac event. In the HEART Score study, these patients were discharged (0.99% in the retrospective study, 1.7% in the prospective study)  Scores 4-6: 12-16.6% risk of adverse cardiac event. In the HEART Score study, these patients were admitted to the hospital. (11.6% retrospective, 16.6% prospective)  Scores ?7: 50-65% risk of adverse cardiac event. In the HEART Score study, these patients were candidates for early invasive measures. (65.2% retrospective, 50.1% prospective)    MEDICATIONS GIVEN IN ER  Medications   sodium chloride 0.9 % flush 10 mL (not administered)   nitroglycerin (NITROSTAT) SL tablet 0.4 mg (0.4 mg Sublingual Given 5/4/19 1239)   methylPREDNISolone sodium succinate (SOLU-Medrol) injection 125 mg (125 mg Intravenous Given 5/4/19 1359)   diphenhydrAMINE (BENADRYL) injection 25 mg (25 mg Intravenous Given 5/4/19 1400)   iopamidol (ISOVUE-300) 61 % injection 100 mL (95 mL Intravenous Given by Other 5/4/19 1522)         PROGRESS AND CONSULTS     1229- Ordered NTG for pt pain. Ordered CXR, labs, and EKG.     1345- Ordered Solu-medrol and Benadryl for pt. Ordered CTA chest and CTA abd.     1441- Rechecked pt. Pt is resting comfortably. Pt reports the relief of most of her pain. Notified pt of her current lab, CXR, and EKG results. Discussed the plan to obtain CT chest and to obtain a repeat Troponin. Further discussed that a CTA will use contrast and that we administered solu-medrol and benadryl prior to obtaining the scan. Pt understands and agrees with the plan, all questions answered.    1552- Rechecked pt. Pt is resting comfortably. Notified pt of her CTA chest, CTA abd,  and repeat troponin results. Discussed the plan to discharge the pt home. I instructed the pt to f/u with the chest pain clinic. RTED instructions given. Pt understands and agrees with the plan, all questions answered.    MEDICAL DECISION MAKING    84-year-old female with history of A. fib status post pacemaker placement on warfarin presents today with substernal chest discomfort and also complaining of back pain.  She reports it almost felt like something she swallowed it go down right but she had not eaten for 2 hours prior to the pain starting.  EKG reveals paced rhythm with no acute ischemic changes noted.  Initial and 2-hour interval cardiac troponin are both normal.  She has had previous notation of a mild dilatation of her ascending aorta, thus CTA chest abdomen pelvis was obtained to evaluate for possible dissection or aneurysm rupture.  She was premedicated with Solu-Medrol and Benadryl as she has reported contrast allergy.  She tolerated the contrast well.  CTA is negative for dissection or significant aneurysm.  She is appropriate for discharge home with chest pain clinic referral.  I suspect this may have been esophageal.  She also had some mild sternal tenderness on exam however this would not explain her back pain.  Return precautions were discussed.    MDM  Number of Diagnoses or Management Options  Chest pain, unspecified type:      Amount and/or Complexity of Data Reviewed  Clinical lab tests: ordered and reviewed (Initial Troponin <0.010  Repeat Troponin <0.010)  Tests in the radiology section of CPT®: ordered and reviewed (CXR- negative acute  CTA chest and abd- negative)  Tests in the medicine section of CPT®: ordered and reviewed (See EKG note.)  Discussion of test results with the performing providers: yes (Dr. Mcclendon (Radiologist))  Decide to obtain previous medical records or to obtain history from someone other than the patient: yes  Independent visualization of images, tracings, or  specimens: yes    Patient Progress  Patient progress: stable         DIAGNOSIS  Final diagnoses:   Chest pain, unspecified type       DISPOSITION  DISCHARGE    Patient discharged in stable condition.    Reviewed implications of results, diagnosis, meds, responsibility to follow up, warning signs and symptoms of possible worsening, potential complications and reasons to return to ER.    Patient/Family voiced understanding of above instructions.    Discussed plan for discharge, as there is no emergent indication for admission. Patient referred to primary care provider for BP management due to today's BP. Pt/family is agreeable and understands need for follow up and repeat testing.  Pt is aware that discharge does not mean that nothing is wrong but it indicates no emergency is present that requires admission and they must continue care with follow-up as given below or physician of their choice.     FOLLOW-UP  [facility]  [address] [facility] [facility] [address] [phone]             Medication List      Changed    gabapentin 300 MG capsule  Commonly known as:  NEURONTIN  Take 1 capsule by mouth 4 (Four) Times a Day.  What changed:  when to take this                Latest Documented Vital Signs:  As of 3:52 PM  BP- 115/51 HR- 64 Temp- 97.8 °F (36.6 °C) (Oral) O2 sat- 98%    --  Documentation assistance provided by nuris Claros for Dr. BHUMI Castro MD.  Information recorded by the nuris was done at my direction and has been verified and validated by me.     Fredi Claros  05/04/19 0092       Tony Castro MD  05/04/19 6544

## 2019-05-08 ENCOUNTER — OFFICE VISIT (OUTPATIENT)
Dept: CARDIOLOGY | Facility: CLINIC | Age: 84
End: 2019-05-08

## 2019-05-08 ENCOUNTER — CLINICAL SUPPORT NO REQUIREMENTS (OUTPATIENT)
Dept: CARDIOLOGY | Facility: CLINIC | Age: 84
End: 2019-05-08

## 2019-05-08 VITALS
WEIGHT: 224.8 LBS | HEART RATE: 85 BPM | SYSTOLIC BLOOD PRESSURE: 126 MMHG | HEIGHT: 64 IN | DIASTOLIC BLOOD PRESSURE: 78 MMHG | BODY MASS INDEX: 38.38 KG/M2

## 2019-05-08 DIAGNOSIS — Z95.0 PACEMAKER: ICD-10-CM

## 2019-05-08 DIAGNOSIS — I44.2 THIRD DEGREE AV BLOCK (HCC): Primary | ICD-10-CM

## 2019-05-08 DIAGNOSIS — I48.0 PAROXYSMAL ATRIAL FIBRILLATION (HCC): ICD-10-CM

## 2019-05-08 DIAGNOSIS — I47.1 PAROXYSMAL SVT (SUPRAVENTRICULAR TACHYCARDIA) (HCC): ICD-10-CM

## 2019-05-08 DIAGNOSIS — I10 ESSENTIAL HYPERTENSION: ICD-10-CM

## 2019-05-08 DIAGNOSIS — G47.33 OSA (OBSTRUCTIVE SLEEP APNEA): ICD-10-CM

## 2019-05-08 DIAGNOSIS — I51.89 DIASTOLIC DYSFUNCTION: ICD-10-CM

## 2019-05-08 DIAGNOSIS — R00.2 PALPITATIONS: ICD-10-CM

## 2019-05-08 DIAGNOSIS — Z87.898 HISTORY OF CHEST PAIN: Primary | ICD-10-CM

## 2019-05-08 DIAGNOSIS — R07.9 CHEST PAIN, UNSPECIFIED TYPE: ICD-10-CM

## 2019-05-08 PROBLEM — I47.10 PAROXYSMAL SVT (SUPRAVENTRICULAR TACHYCARDIA): Status: ACTIVE | Noted: 2019-05-08

## 2019-05-08 PROCEDURE — 99214 OFFICE O/P EST MOD 30 MIN: CPT | Performed by: NURSE PRACTITIONER

## 2019-05-08 PROCEDURE — 93280 PM DEVICE PROGR EVAL DUAL: CPT | Performed by: INTERNAL MEDICINE

## 2019-05-08 PROCEDURE — 93000 ELECTROCARDIOGRAM COMPLETE: CPT | Performed by: NURSE PRACTITIONER

## 2019-05-08 NOTE — PROGRESS NOTES
Date of Office Visit: 2019  Encounter Provider: Tereza Jackson, NIKITA, APRN  Place of Service: [facility] CARDIOLOGY  Patient Name: Brittany Villeda  :1935        Subjective:     Chief Complaint:  Follow-up, paroxysmal atrial fibrillation, hypertension, hypertensive heart disease, chronic anticoagulation therapy.      History of Present Illness:  Brittany Villeda is a 84 y.o. female patient of Dr. Culp.  I am seeing this patient in the office today and I reviewed her records.    Patient has a history of  paroxysmal atrial fibrillation, hypertension, obstructive sleep apnea, obesity, imobility, pulmonary hypertension, nonsustained ventricular tachycardia, diastolic dysfunction.     In  patient had nonsustained ventricular tachycardia.  Stress perfusion study was negative for ischemia.  Echocardiogram showed normal left ventricular size and function with moderate LVH.  2012 she had chronic obstructive pulmonary disease exacerbation as well as chest pain that resolved with treatment of her COPD.  2013 she had persistent dizziness in the setting of new onset paroxysmal atrial fibrillation.  She was admitted to the hospital and not felt to have had a stroke.  Symptoms actually resolved with ear manipulation and were felt to be more vestibular in nature.  She also developed paroxysmal atrial fibrillation with early bradycardia which resolved with treatment of sleep apnea and AV flaco blocker therapy.  She was placed on warfarin.  She also had a heme positive stool with anemia and was seen by gastroenterology and EGD and colonoscopy were done.  EGD showed mild erythema but otherwise stable.  She initiated therapy of BiPAP.  2017 she presented to the ER with chest pain.  Ruled out for MI.  Stress perfusion study was negative for ischemia with normal systolic function.  Zio patch shows sinus rhythm with episodes of SVT that was symptomatic.  2018 EKG  showed pauses in the setting of bifascicular block.  24-hour Holter showed 65 pauses 3 seconds long in duration.  Sinus rhythm was present throughout most of the study.  With complaints of palpitations PACs were noted.  There were also 14 episodes of atrial tachycardia lasting 4 beats.  There is also episode of 2-1 AV block and questionable third-degree block.  Echocardiogram showed normal systolic function, grade 1 diastolic dysfunction, borderline right ventricular enlargement, mild to moderate tricuspid valve regurgitation, and RVSP of 48 mmHg.  A sending aorta was mildly dilated at 3.8 cm.  Pacemaker placement was recommended the patient deferred.  By September 2018 patient was noted to have more symptomatic bradycardia and after much discussion underwent pacemaker placement November 2018.    Patient was seen in the ER 5/4/2019 when she presented with intermittent midsternal chest tightness that began prior to arrival.  She reported that the pain radiated to her back and felt like she had something stuck in her throat.  She was sitting down when the pain began and had eaten something about 2 hours prior to the onset.  It was associated with diaphoresis, lightheadedness.  CT angiogram of the chest showed a sending aorta with maximal AP diameter 4 cm.  No dissection seen.  No evidence of pulmonary emboli.  Thoracic and lumbar spine degenerative changes were seen.  No acute abdominal abnormalities were seen on abdomen/pelvis CT.  Lipase level was within normal limits, as was proBNP.  Troponins were negative x2.  EKG showed paced rhythm without acute changes.  Patient was instructed to follow-up with cardiology outpatient.  Patient symptoms were felt to be esophageal in nature.      Patient presents to office today for follow-up appointment.  Patient's daughter is with her in the office today, per patient preference.  Patient reports that prior to her ER visit she was sitting and talking to her friend from [employer].   "Her son told her that he forgot her bank receipt and she had a sudden onset of chest pain and pressure/heaviness that radiated to her back.  She then developed sweating, lightheadedness, shortness of breath, near syncope.  She reports she called 911 and went to the ER and when she got there she was still having symptoms on and off at the hospital but not as bad as they initially had been in her house.  She usually only drinks 1 cup of caffeine per day and does not drink alcohol.  She denies any recurrence of chest discomfort symptoms since ER visit, however she has been having intermittent palpitations that she describes as her heart beating fast \" like it is coming out of the chest.\"  These mostly occur at night when she is watching the news, and usually occur 2-3 times per day but do not occur when she gets in bed at night.  She has some chronic dyspnea on exertion, however she reports that this is not new or worsening since last visit.  Denies any lower extremity edema, dizziness, syncope, near syncope, falls, fatigue, or abnormal bleeding.  She does have a history of untreated sleep apnea.  Pacemaker was checked in the office today which showed she has remained in atrial fibrillation since the evening of 5/6/2019.  However she is only having palpitation symptoms intermittently.  We will place Holter monitor to see if average heart rate remains well controlled.  Blood pressure and heart rate are well controlled in the office today.         Past Medical History:   Diagnosis Date   • Anemia    • Arrhythmia    • Arthritis    • Asthma    • Bradycardia    • Chest pain    • Colitis    • COPD (chronic obstructive pulmonary disease) (CMS/[facility])    • Diastolic dysfunction    • Essential hypertension 5/12/2016   • GERD (gastroesophageal reflux disease)    • Heart block    • Hypertension    • Hypertensive heart disease    • Hyperthyroidism    • Kidney stone    • Leukopenia    • Low back pain    • MGUS (monoclonal gammopathy " of unknown significance)    • Nephrolithiasis    • Obesity    • Paroxysmal atrial fibrillation (CMS/HCC)    • Peptic ulceration    • Pulmonary hypertension (CMS/HCC)    • Sleep apnea    • Trifascicular block    • Ventricular tachycardia (CMS/HCC)     nonsustained   • Vertigo      Past Surgical History:   Procedure Laterality Date   • BACK SURGERY      lumbar fusion   • CARDIAC ELECTROPHYSIOLOGY PROCEDURE N/A 11/7/2018    Procedure: Pacemaker DC new   [place];  Surgeon: Jesus Granda MD;  Location:  INVASIVE LOCATION;  Service: Cardiology   • CHOLECYSTECTOMY     • COLONOSCOPY  06/16/2014    colitis, cryptitis,  tics, NBIH, TA w/low grade dysplasia   • HEMORRHOIDECTOMY     • HYSTERECTOMY     • KNEE ARTHROPLASTY     • MYOMECTOMY     • SHOULDER SURGERY     • SINUS SURGERY     • TOE NAIL AMPUTATION  03/04/2019   • TONSILLECTOMY       Outpatient Medications Prior to Visit   Medication Sig Dispense Refill   • Acetaminophen (PAIN RELIEVER PO) Take 1 tablet by mouth As Needed.     • calcium carbonate (OS-ALIDA) 600 MG tablet Take 600 mg by mouth Daily.     • furosemide (LASIX) 20 MG tablet Take 2 tablets by mouth Every Morning. 20mg in the  tablet 1   • gabapentin (NEURONTIN) 300 MG capsule Take 1 capsule by mouth 4 (Four) Times a Day. (Patient taking differently: Take 300 mg by mouth 3 (Three) Times a Day.) 360 capsule 1   • gentamicin (GARAMYCIN) 0.1 % cream Apply  topically to the appropriate area as directed 3 (Three) Times a Day.     • mesalamine (LIALDA) 1.2 g EC tablet Take 4 tablets by mouth Daily. 360 tablet 0   • mometasone (ELOCON) 0.1 % ointment Apply 1 application topically to the appropriate area as directed 2 (Two) Times a Day As Needed. As needed     • omeprazole (priLOSEC) 20 MG capsule TAKE 1 CAPSULE EVERY DAY 90 capsule 0   • vitamin B-12 (CYANOCOBALAMIN) 1000 MCG tablet Take 1,000 mcg by mouth Daily.     • warfarin (COUMADIN) 5 MG tablet TAKE 1 TABLET EVERY DAY 90 tablet 1     No  facility-administered medications prior to visit.        Allergies as of 2019 - Reviewed 2019   Allergen Reaction Noted   • Codeine Hallucinations 2017   • Amitriptyline Rash 2016   • Carisoprodol-aspirin-codeine Unknown (See Comments) 2016   • Tramadol Unknown (See Comments) 2019   • Amoxicillin-pot clavulanate Rash 2016   • Aspirin Unknown (See Comments) 2016   • Bactrim [sulfamethoxazole-trimethoprim] Rash 2016   • Iodinated diagnostic agents Rash 2016   • Latex Rash 2016   • Naproxen Rash 2016   • Nsaids Unknown (See Comments) 2016   • Soma compound with codeine [carisoprodol-aspirin-codeine] Rash 2016   • Sulfa antibiotics Rash 2016     Social History     Socioeconomic History   • Marital status:      Spouse name: Not on file   • Number of children: 10   • Years of education: High School   • Highest education level: Not on file   Occupational History   • Occupation: Caregiver     Employer: RETIRED     Comment: worked for [employer]   Tobacco Use   • Smoking status: Former Smoker     Packs/day: 1.50     Years: 10.00     Pack years: 15.00     Start date:      Last attempt to quit:      Years since quittin.3   • Smokeless tobacco: Never Used   • Tobacco comment: QUIT SMOKING    Substance and Sexual Activity   • Alcohol use: No     Comment: Daily caffeine use - one cup of coffee   • Drug use: No   • Sexual activity: No     Family History   Problem Relation Age of Onset   • Diabetes Mother    • Breast cancer Sister    • Kidney cancer Sister    • Heart disease Sister    • Prostate cancer Brother    • Prostate cancer Brother    • Prostate cancer Brother        Review of Systems   Constitution: Negative for chills, fever, malaise/fatigue, weight gain and weight loss.   HENT: Negative for ear pain, hearing loss, nosebleeds and sore throat.    Eyes: Negative for blurred vision, double vision,  "redness, vision loss in left eye, vision loss in right eye and visual disturbance.   Cardiovascular: Positive for irregular heartbeat.        SEE HPI.    Respiratory: Negative for cough, shortness of breath, snoring and wheezing.    Endocrine: Negative for cold intolerance and heat intolerance.   Skin: Negative for itching, rash and suspicious lesions.   Musculoskeletal: Negative for joint pain, joint swelling and myalgias.   Gastrointestinal: Negative for abdominal pain, diarrhea, hematemesis, melena, nausea and vomiting.   Genitourinary: Negative for dysuria, frequency and hematuria.   Neurological: Negative for dizziness, headaches, numbness, paresthesias and seizures.   Psychiatric/Behavioral: Negative for altered mental status and depression. The patient is not nervous/anxious.           Objective:     Vitals:    05/08/19 1449   BP: 126/78   BP Location: Right arm   Cuff Size: Large Adult   Pulse: 85   Weight: 102 kg (224 lb 12.8 oz)   Height: 162.6 cm (64\")     Body mass index is 38.59 kg/m².      PHYSICAL EXAM:  Physical Exam   Constitutional: She is oriented to person, place, and time. She appears well-developed and well-nourished. No distress.   HENT:   Head: Normocephalic and atraumatic.   Eyes: Pupils are equal, round, and reactive to light.   Neck: Neck supple. No JVD present. Carotid bruit is not present. No tracheal deviation present.   Cardiovascular: Normal rate, normal heart sounds and intact distal pulses. An irregularly irregular rhythm present. Exam reveals no gallop and no friction rub.   No murmur heard.  Pulses:       Radial pulses are 2+ on the right side, and 2+ on the left side.        Posterior tibial pulses are 2+ on the right side, and 2+ on the left side.   Pulmonary/Chest: Effort normal and breath sounds normal. No respiratory distress. She has no wheezes. She has no rales.   Abdominal: Soft. Bowel sounds are normal. She exhibits no distension. There is no tenderness. "   Musculoskeletal: She exhibits no edema, tenderness or deformity.   Neurological: She is alert and oriented to person, place, and time.   Skin: Skin is warm and dry. No rash noted. She is not diaphoretic. No erythema.   Psychiatric: She has a normal mood and affect. Her behavior is normal. Judgment normal.           ECG 12 Lead  Date/Time: 5/8/2019 3:05 PM  Performed by: Tereza Jackson DNP, APRN  Authorized by: Tereza Jackson DNP, SHAHID   Comparison: compared with previous ECG from 3/18/2019  Comparison to previous ECG: Patient does not appear to be in ventricular paced rhythm today.  Rhythm: atrial fibrillation  Rate: normal  BPM: 85  Conduction: right bundle branch block and left anterior fascicular block  Other findings: non-specific ST-T wave changes    Clinical impression: abnormal EKG              Assessment:       Diagnosis Plan   1. History of chest pain  ECG 12 Lead    Stress Test With Pet Myocardial Perfusion (MULTI STUDY, REST AND STRESS)   2. Palpitations  ECG 12 Lead    Holter Monitor - 24 Hour   3. Paroxysmal atrial fibrillation (CMS/HCC)  ECG 12 Lead    Holter Monitor - 24 Hour    Stress Test With Pet Myocardial Perfusion (MULTI STUDY, REST AND STRESS)   4. Paroxysmal SVT (supraventricular tachycardia) (CMS/HCC)  ECG 12 Lead    Holter Monitor - 24 Hour   5. Pacemaker  ECG 12 Lead   6. Essential hypertension     7. Diastolic dysfunction     8. HUGO (obstructive sleep apnea)     9. Chest pain, unspecified type   Stress Test With Pet Myocardial Perfusion (MULTI STUDY, REST AND STRESS)         Plan:     1. History of chest pain: Negative PET stress test December 2017.   Had not had any recurrence until 5/4/2019.  Negative troponins x2 at this time and normal proBNP.  Patient symptoms had some typical and atypical features.  They occurred with stress and the associated symptoms are concerning for possible ischemia.  Would recommend repeat stress test at this time.  2. Palpitations: Patient continues  to have palpitations multiple times a day, mostly in the evening, however not at bedtime.  Will check pacemaker in the office today.  Also recommend Holter monitor.  3. History of atrial fibrillation and paroxysmal SVT with sick sinus syndrome pleat heart block status post permanent pacemaker placement: Continue with routine device checks.  Pacemaker check in the office today shows atrial fibrillation has been present consistently since 5/6/2019 in the evening.  Overall atrial fibrillation burden only 3% since last check.  No other events/episodes seen.  4. Hypertension: Blood pressure well controlled in the office today.  5. History of diastolic dysfunction: Appears euvolemic in the office today.  Lungs clear and no edema.  Recent normal proBNP.  6. Ascending aortic dilation:: CT chest without contrast 3/29/2019 showed mildly dilated ascending thoracic aorta at 4 cm.  CTA chest without contrast 5/4/2019 showed maximum AP diameter of ascending aorta 4 cm.  7. Pulmonary hypertension: RVSP 48 mmHg.  Patient saw Dr. Tao in refused further testing for sleep apnea.  8. Untreated obstructive sleep apnea: Intolerant of BiPAP machine.    Patient to keep 7/11/2019 follow-up with Dr. Culp as scheduled or follow-up sooner if needed for any new, recurrent, or worsening symptoms or other problems/concerns.    Greater than 13 minutes of 25-minute office visit spent face-to-face with patient discussing recommended testing, recommended follow-up, signs/symptoms that would warrant sooner follow-up or ER visit.           Your medication list           Accurate as of 5/8/19  4:57 PM. If you have any questions, ask your nurse or doctor.               CHANGE how you take these medications      Instructions Last Dose Given Next Dose Due   gabapentin 300 MG capsule  Commonly known as:  NEURONTIN  What changed:  when to take this      Take 1 capsule by mouth 4 (Four) Times a Day.          CONTINUE taking these medications       Instructions Last Dose Given Next Dose Due   calcium carbonate 600 MG tablet  Commonly known as:  OS-ALIDA      Take 600 mg by mouth Daily.       furosemide 20 MG tablet  Commonly known as:  LASIX      Take 2 tablets by mouth Every Morning. 20mg in the PM       gentamicin 0.1 % cream  Commonly known as:  GARAMYCIN      Apply  topically to the appropriate area as directed 3 (Three) Times a Day.       mesalamine 1.2 g EC tablet  Commonly known as:  LIALDA      Take 4 tablets by mouth Daily.       mometasone 0.1 % ointment  Commonly known as:  ELOCON      Apply 1 application topically to the appropriate area as directed 2 (Two) Times a Day As Needed. As needed       omeprazole 20 MG capsule  Commonly known as:  priLOSEC      TAKE 1 CAPSULE EVERY DAY       PAIN RELIEVER PO      Take 1 tablet by mouth As Needed.       vitamin B-12 1000 MCG tablet  Commonly known as:  CYANOCOBALAMIN      Take 1,000 mcg by mouth Daily.       warfarin 5 MG tablet  Commonly known as:  COUMADIN      TAKE 1 TABLET EVERY DAY              I did not stop or change the above medications.  Patient's medication list was updated to reflect medications they are currently taking including medication changes made by other providers.          Thanks,    Tereza Jackson, NIKITA, APRN  05/08/2019             Dictated utilizing Dragon dictation

## 2019-05-11 ENCOUNTER — CLINICAL SUPPORT NO REQUIREMENTS (OUTPATIENT)
Dept: CARDIOLOGY | Facility: CLINIC | Age: 84
End: 2019-05-11

## 2019-05-11 DIAGNOSIS — I44.2 THIRD DEGREE AV BLOCK (HCC): Primary | ICD-10-CM

## 2019-05-13 ENCOUNTER — TELEPHONE (OUTPATIENT)
Dept: CARDIOLOGY | Facility: CLINIC | Age: 84
End: 2019-05-13

## 2019-05-13 NOTE — TELEPHONE ENCOUNTER
At/af burden alert received over the weekend.  She does have a history of paf and is on warfarin.  She was in the office on 5/6/19 and af is continuous since then. She saw Tereza Jackson on 5/6/19, so I am sending this message to her as well.   I am turning of at/af burden alert that is currently set to alert us each time she has 24 hours of af.  This message will come up under Dr. Granda's name, but that is because referring MD was set to him, but it is actually Dr. Culp and I have corrected his information in [employer].

## 2019-05-14 NOTE — TELEPHONE ENCOUNTER
When I ordered the holter she had been in persistent a. Fib for days but was only having palpitations in the evening.  I wanted to see if overall heart rates were controlled and rule out other possible arrhythmias that would possibly be causing the palpitations since they were not consistent but she was in persistent a. Fib.     Will discuss with you when you get here.  Thanks

## 2019-05-14 NOTE — TELEPHONE ENCOUNTER
Alvarado--  Please CANCEL holter but do not cancel patient's stress test scheduled for next week.  Patient still needs stress test done, as scheduled.     (Spoke with Dr. Culp who thinks holter not needed at this time).   Thanks!

## 2019-05-14 NOTE — TELEPHONE ENCOUNTER
Cristy, please see me in am.  I am not sure why the A. fib alert was turned off.  Please turn it back on.  We will need to treat further as patient has symptoms of palpitations.  Milady, she does not need a Holter.  Okay for stress test.  See me in a.m.  May need to start low-dose beta-blocker therapy or digoxin.. aravind

## 2019-05-15 ENCOUNTER — TELEPHONE (OUTPATIENT)
Dept: CARDIOLOGY | Facility: CLINIC | Age: 84
End: 2019-05-15

## 2019-05-15 ENCOUNTER — CLINICAL SUPPORT NO REQUIREMENTS (OUTPATIENT)
Dept: CARDIOLOGY | Facility: CLINIC | Age: 84
End: 2019-05-15

## 2019-05-15 DIAGNOSIS — I44.2 THIRD DEGREE AV BLOCK (HCC): Primary | ICD-10-CM

## 2019-05-15 DIAGNOSIS — I48.0 PAF (PAROXYSMAL ATRIAL FIBRILLATION) (HCC): Primary | ICD-10-CM

## 2019-05-15 NOTE — TELEPHONE ENCOUNTER
"Dr. Culp--      I called and spoke with patient.      She has not had any recurrent palpitations since before last visit.  Denies increased fatigue or shortness of breath.  Denies any recurrent chest discomfort.     Has history of allergy to carisoprodol-aspirin-codeine and thinks it may have given her hives or thinks it may have \"made her go crazy\"  -- she can not remember exact reaction to it.      Given this allergy beta-blockers are coming up as possible cross reaction.  She reports she has never taken a beta-blocker.     In the past it looks like she has been on AV flaco blocker therapy with diltiazem.    At this time would you recommend continuing to monitor she she is asymptomatic, trying low dose diltiazem with careful BP monitoring, or cautiously trying low dose beta-blocker?    Recent BPs:  122/72, 112/70, 113/69, 114/82, 125/69, 116/69, 124/72, 114/59, 127/74.   "

## 2019-05-15 NOTE — TELEPHONE ENCOUNTER
Alert dual chamber pacemaker report received due to afib burden. Alert is back on per your task 5/13. Patient has been in continuous afib since office visit on 5/8.

## 2019-05-16 NOTE — TELEPHONE ENCOUNTER
The only thing I did was make a change to the alert settings in [employer] (so that it would not send a transmission each time at/af burden alert was met).  This alert is usually used for pt that do not have a history of af or pt's with history of af and not anticoagulated.  I did this since she was in continuous af for 5 days and is on warfarin.  Her pacemaker will continue to record af episodes and af burden that would have been available each time we checked it even if the burden alert is off.  I was off the past 2 days and the burden alert was turned back on while I was off/

## 2019-05-16 NOTE — TELEPHONE ENCOUNTER
Called patient, and she had her daughter get on the line also, to discuss medication recommendations and recommendations to continue to monitor HR and BP and call if SBP staying below 100 or HR below 50 or if she develops dizziness/ lightheadedness.     Also recommend keeping 7/11/19 follow-up with Dr. Culp as scheduled or follow-up sooner if needed for any new, recurrent, or worsening symptoms, or other problems/ concerns.     They verbalized understanding of plan.

## 2019-05-17 NOTE — TELEPHONE ENCOUNTER
Thanks you.  OK.  [place] alert is off now.  Will continue to record af episode and af burden in the device, just not transmit alerts for this information.

## 2019-05-20 ENCOUNTER — CLINICAL SUPPORT NO REQUIREMENTS (OUTPATIENT)
Dept: CARDIOLOGY | Facility: CLINIC | Age: 84
End: 2019-05-20

## 2019-05-20 DIAGNOSIS — I44.2 COMPLETE HEART BLOCK (HCC): Primary | ICD-10-CM

## 2019-05-21 ENCOUNTER — ANTICOAGULATION VISIT (OUTPATIENT)
Dept: PHARMACY | Facility: HOSPITAL | Age: 84
End: 2019-05-21

## 2019-05-21 DIAGNOSIS — I48.0 PAROXYSMAL ATRIAL FIBRILLATION (HCC): ICD-10-CM

## 2019-05-21 LAB
INR PPP: 3.3 (ref 0.91–1.09)
PROTHROMBIN TIME: 39.4 SECONDS (ref 10–13.8)

## 2019-05-21 PROCEDURE — 85610 PROTHROMBIN TIME: CPT

## 2019-05-21 PROCEDURE — 36416 COLLJ CAPILLARY BLOOD SPEC: CPT

## 2019-05-21 PROCEDURE — G0463 HOSPITAL OUTPT CLINIC VISIT: HCPCS

## 2019-05-21 NOTE — PROGRESS NOTES
Anticoagulation Clinic Progress Note    Anticoagulation Summary  As of 5/21/2019    INR goal:   2.0-3.0   TTR:   72.5 % (7.5 mo)   INR used for dosing:   3.3! (5/21/2019)   Warfarin maintenance plan:   2.5 mg every Tue, Thu, Sat; 5 mg all other days   Weekly warfarin total:   27.5 mg   Plan last modified:   Yasmeen Pichardo RPH (5/21/2019)   Next INR check:   5/29/2019   Priority:   High   Target end date:   Indefinite    Indications    Paroxysmal atrial fibrillation (CMS/HCC) [I48.0]             Anticoagulation Episode Summary     INR check location:       Preferred lab:       Send INR reminders to:   [facility] CLINICAL POOL    Comments:         Anticoagulation Care Providers     Provider Role Specialty Phone number    Mary Grace Culp MD Referring Cardiology [phone]    Janice Hart [facility] Responsible Pharmacy [phone]          Clinic Interview:      INR History:  Anticoagulation Monitoring 4/15/2019 4/29/2019 5/21/2019   INR 2.2 1.9 3.3   INR Date 4/15/2019 4/29/2019 5/21/2019   INR Goal 2.0-3.0 2.0-3.0 2.0-3.0   Trend Same Same Down   Last Week Total 32.5 mg 30 mg 30 mg   Next Week Total 30 mg 30 mg 27.5 mg   Sun 5 mg 5 mg 5 mg   Mon 5 mg 5 mg 5 mg   Tue 5 mg 5 mg 2.5 mg   Wed 5 mg 5 mg 5 mg   Thu 2.5 mg 2.5 mg 2.5 mg   Fri 5 mg 5 mg 5 mg   Sat 2.5 mg 2.5 mg 2.5 mg   Visit Report - - -   Some recent data might be hidden       Plan:  1. INR is Supratherapeutic today potentially due to drug interaction with warfarin/diltiazem- see above in Anticoagulation Summary.  Will instruct Brittany Villeda to Change their warfarin regimen- see above in Anticoagulation Summary.  2. Follow up in 1 week  3. Patient declines warfarin refills.  4. Verbal and written information provided. Patient expresses understanding and has no further questions at this time.    Yasmeen Pichardo [facility]

## 2019-05-28 ENCOUNTER — TELEPHONE (OUTPATIENT)
Dept: GASTROENTEROLOGY | Facility: CLINIC | Age: 84
End: 2019-05-28

## 2019-05-29 ENCOUNTER — HOSPITAL ENCOUNTER (OUTPATIENT)
Dept: CARDIOLOGY | Facility: HOSPITAL | Age: 84
Discharge: HOME | End: 2019-05-29
Admitting: NURSE PRACTITIONER

## 2019-05-29 ENCOUNTER — ANTICOAGULATION VISIT (OUTPATIENT)
Dept: PHARMACY | Facility: HOSPITAL | Age: 84
End: 2019-05-29

## 2019-05-29 DIAGNOSIS — R07.9 CHEST PAIN, UNSPECIFIED TYPE: ICD-10-CM

## 2019-05-29 DIAGNOSIS — Z87.898 HISTORY OF CHEST PAIN: ICD-10-CM

## 2019-05-29 DIAGNOSIS — I48.0 PAROXYSMAL ATRIAL FIBRILLATION (HCC): ICD-10-CM

## 2019-05-29 LAB
BH CV STRESS BP STAGE 1: NORMAL
BH CV STRESS COMMENTS STAGE 1: NORMAL
BH CV STRESS DOSE REGADENOSON STAGE 1: 0.4
BH CV STRESS DURATION MIN STAGE 1: 0
BH CV STRESS DURATION SEC STAGE 1: 10
BH CV STRESS HR STAGE 1: 75
BH CV STRESS PROTOCOL 1: NORMAL
BH CV STRESS RECOVERY BP: NORMAL MMHG
BH CV STRESS RECOVERY HR: 72 BPM
BH CV STRESS STAGE 1: 1
INR PPP: 3.7 (ref 0.91–1.09)
LV EF NUC BP: 69 %
MAXIMAL PREDICTED HEART RATE: 136 BPM
PERCENT MAX PREDICTED HR: 55.15 %
PROTHROMBIN TIME: 44.2 SECONDS (ref 10–13.8)
STRESS BASELINE BP: NORMAL MMHG
STRESS BASELINE HR: 72 BPM
STRESS PERCENT HR: 65 %
STRESS POST EXERCISE DUR SEC: 10 SEC
STRESS POST PEAK BP: NORMAL MMHG
STRESS POST PEAK HR: 75 BPM
STRESS TARGET HR: 116 BPM

## 2019-05-29 PROCEDURE — 93018 CV STRESS TEST I&R ONLY: CPT | Performed by: INTERNAL MEDICINE

## 2019-05-29 PROCEDURE — 93017 CV STRESS TEST TRACING ONLY: CPT

## 2019-05-29 PROCEDURE — 78492 MYOCRD IMG PET MLT RST&STRS: CPT | Performed by: INTERNAL MEDICINE

## 2019-05-29 PROCEDURE — G0463 HOSPITAL OUTPT CLINIC VISIT: HCPCS

## 2019-05-29 PROCEDURE — 93016 CV STRESS TEST SUPVJ ONLY: CPT | Performed by: INTERNAL MEDICINE

## 2019-05-29 PROCEDURE — 36416 COLLJ CAPILLARY BLOOD SPEC: CPT

## 2019-05-29 PROCEDURE — 78492 MYOCRD IMG PET MLT RST&STRS: CPT

## 2019-05-29 PROCEDURE — 85610 PROTHROMBIN TIME: CPT

## 2019-05-29 PROCEDURE — 0 RUBIDIUM CHLORIDE: Performed by: NURSE PRACTITIONER

## 2019-05-29 PROCEDURE — A9555 RB82 RUBIDIUM: HCPCS | Performed by: NURSE PRACTITIONER

## 2019-05-29 PROCEDURE — 25010000002 REGADENOSON 0.4 MG/5ML SOLUTION: Performed by: NURSE PRACTITIONER

## 2019-05-29 RX ORDER — FUROSEMIDE 20 MG/1
TABLET ORAL
Qty: 270 TABLET | Refills: 1 | Status: SHIPPED | OUTPATIENT
Start: 2019-05-29 | End: 2019-10-16 | Stop reason: SDUPTHER

## 2019-05-29 RX ORDER — MESALAMINE 1.2 G/1
TABLET, DELAYED RELEASE ORAL
Qty: 360 TABLET | Refills: 0 | Status: SHIPPED | OUTPATIENT
Start: 2019-05-29 | End: 2019-06-06 | Stop reason: CLARIF

## 2019-05-29 RX ADMIN — RUBIDIUM CHLORIDE RB-82 1 DOSE: 150 INJECTION, SOLUTION INTRAVENOUS at 09:32

## 2019-05-29 RX ADMIN — REGADENOSON 0.4 MG: 0.08 INJECTION, SOLUTION INTRAVENOUS at 09:44

## 2019-05-29 RX ADMIN — RUBIDIUM CHLORIDE RB-82 1 DOSE: 150 INJECTION, SOLUTION INTRAVENOUS at 09:44

## 2019-05-29 NOTE — PROGRESS NOTES
Please call patient let her know that her stress test looked good without signs of tightly blocked arteries.  It was considered a low risk study.  At this point I recommend continuing to monitor her blood pressure and call if she has systolic blood pressure readings less than 100, heart rate less than 50, or she develops dizziness or lightheadedness.  Keep July follow-up with Dr. Culp as scheduled or follow-up sooner if needed for any new, recurrent, or worsening symptoms or other problems/concerns.

## 2019-05-29 NOTE — PROGRESS NOTES
Anticoagulation Clinic Progress Note    Anticoagulation Summary  As of 5/29/2019    INR goal:   2.0-3.0   TTR:   70.1 % (7.8 mo)   INR used for dosing:   3.7! (5/29/2019)   Warfarin maintenance plan:   2.5 mg every Tue, Thu, Sat; 5 mg all other days   Weekly warfarin total:   27.5 mg   Plan last modified:   Yasmeen Pichardo [facility] (5/21/2019)   Next INR check:   6/5/2019   Priority:   High   Target end date:   Indefinite    Indications    Paroxysmal atrial fibrillation (CMS/HCC) [I48.0]             Anticoagulation Episode Summary     INR check location:       Preferred lab:       Send INR reminders to:    [facility]    Comments:         Anticoagulation Care Providers     Provider Role Specialty Phone number    Mary Grace Culp MD Referring Cardiology [phone]    Janice Hart [facility] Responsible Pharmacy [phone]          Clinic Interview:  Patient Findings     Negatives:   Signs/symptoms of thrombosis, Signs/symptoms of bleeding,   Laboratory test error suspected, Change in health, Change in alcohol use,   Change in activity, Upcoming invasive procedure, Emergency department   visit, Upcoming dental procedure, Missed doses, Extra doses, Change in   medications, Change in diet/appetite, Hospital admission, Bruising, Other   complaints      Clinical Outcomes     Negatives:   Major bleeding event, Thromboembolic event,   Anticoagulation-related hospital admission, Anticoagulation-related ED   visit, Anticoagulation-related fatality        INR History:  Anticoagulation Monitoring 4/29/2019 5/21/2019 5/29/2019   INR 1.9 3.3 3.7   INR Date 4/29/2019 5/21/2019 5/29/2019   INR Goal 2.0-3.0 2.0-3.0 2.0-3.0   Trend Same Down Same   Last Week Total 30 mg 30 mg 27.5 mg   Next Week Total 30 mg 27.5 mg 25 mg   Sun 5 mg 5 mg 5 mg   Mon 5 mg 5 mg 5 mg   Tue 5 mg 2.5 mg 2.5 mg   Wed 5 mg 5 mg 2.5 mg (5/29)   Thu 2.5 mg 2.5 mg 2.5 mg   Fri 5 mg 5 mg 5 mg   Sat 2.5 mg 2.5 mg 2.5 mg   Visit Report - - -   Some recent  data might be hidden       Plan:  1. INR is Supratherapeutic today- see above in Anticoagulation Summary.  Will instruct Brittany Villeda to Change their warfarin regimen- see above in Anticoagulation Summary.  2. Follow up in 1 week  3. Patient declines warfarin refills.  4. Verbal and written information provided. Patient expresses understanding and has no further questions at this time.    Nilesh Matthews, [facility]

## 2019-05-29 NOTE — PATIENT INSTRUCTIONS
Partial dose of 2.5 mg today, then resume normal schedule tomorrow of 2.5 mg on Tues, Thurs, Sat and 5 mg rest of week. Check INR in 1 week.

## 2019-06-03 ENCOUNTER — TELEPHONE (OUTPATIENT)
Dept: GASTROENTEROLOGY | Facility: CLINIC | Age: 84
End: 2019-06-03

## 2019-06-03 NOTE — TELEPHONE ENCOUNTER
Faxed request received from [email or web address] for formulary alternative for Mesalamine 1.2 gm - formulary alternative would be balsalazide or sulfasalazine.      Call to pt.  Advise of above.  States ok with switching to formulary if Dr English thinks is appropriate.    Message to DR English.

## 2019-06-05 ENCOUNTER — ANTICOAGULATION VISIT (OUTPATIENT)
Dept: PHARMACY | Facility: HOSPITAL | Age: 84
End: 2019-06-05

## 2019-06-05 DIAGNOSIS — I48.0 PAROXYSMAL ATRIAL FIBRILLATION (HCC): ICD-10-CM

## 2019-06-05 LAB
INR PPP: 3.9 (ref 0.91–1.09)
PROTHROMBIN TIME: 46.9 SECONDS (ref 10–13.8)

## 2019-06-05 PROCEDURE — 85610 PROTHROMBIN TIME: CPT

## 2019-06-05 PROCEDURE — 36416 COLLJ CAPILLARY BLOOD SPEC: CPT

## 2019-06-05 PROCEDURE — G0463 HOSPITAL OUTPT CLINIC VISIT: HCPCS

## 2019-06-05 NOTE — PROGRESS NOTES
Anticoagulation Clinic Progress Note    Anticoagulation Summary  As of 6/5/2019    INR goal:   2.0-3.0   TTR:   68.0 % (8 mo)   INR used for dosing:   3.9! (6/5/2019)   Warfarin maintenance plan:   5 mg every Mon, Wed, Fri; 2.5 mg all other days   Weekly warfarin total:   25 mg   Plan last modified:   Rusty Interiano [facility] (6/5/2019)   Next INR check:   6/11/2019   Priority:   High   Target end date:   Indefinite    Indications    Paroxysmal atrial fibrillation (CMS/HCC) [I48.0]             Anticoagulation Episode Summary     INR check location:       Preferred lab:       Send INR reminders to:   [facility] POOL    Comments:         Anticoagulation Care Providers     Provider Role Specialty Phone number    Mary Grace Culp MD Referring Cardiology [phone]    Janice Hart [facility] Responsible Pharmacy [phone]          Clinic Interview:  Patient Findings     Positives:   Change in medications    Negatives:   Signs/symptoms of thrombosis, Signs/symptoms of bleeding,   Laboratory test error suspected, Change in health, Change in alcohol use,   Change in activity, Upcoming invasive procedure, Emergency department   visit, Upcoming dental procedure, Missed doses, Extra doses, Change in   diet/appetite, Hospital admission, Bruising, Other complaints    Comments:   Only change is diltiazem initiation 2 wks ago.      Clinical Outcomes     Negatives:   Major bleeding event, Thromboembolic event,   Anticoagulation-related hospital admission, Anticoagulation-related ED   visit, Anticoagulation-related fatality    Comments:   Only change is diltiazem initiation 2 wks ago.        INR History:  Anticoagulation Monitoring 5/21/2019 5/29/2019 6/5/2019   INR 3.3 3.7 3.9   INR Date 5/21/2019 5/29/2019 6/5/2019   INR Goal 2.0-3.0 2.0-3.0 2.0-3.0   Trend Down Same Down   Last Week Total 30 mg 27.5 mg 25 mg   Next Week Total 27.5 mg 25 mg 22.5 mg   Sun 5 mg 5 mg 2.5 mg   Mon 5 mg 5 mg 5 mg   Tue 2.5 mg 2.5 mg -   Wed 5 mg  2.5 mg (5/29) 2.5 mg (6/5)   Thu 2.5 mg 2.5 mg 2.5 mg   Fri 5 mg 5 mg 5 mg   Sat 2.5 mg 2.5 mg 2.5 mg   Visit Report - - -   Some recent data might be hidden       Plan:  1. INR is Supratherapeutic today- see above in Anticoagulation Summary.  Will instruct Brittany Villeda to Change their warfarin regimen- see above in Anticoagulation Summary.  2. Follow up in 1 week  3. Patient declines warfarin refills.  4. Verbal and written information provided. Patient expresses understanding and has no further questions at this time.    Rusty Interiano [facility]

## 2019-06-06 RX ORDER — BALSALAZIDE DISODIUM 750 MG/1
2250 CAPSULE ORAL 3 TIMES DAILY
Qty: 270 CAPSULE | Refills: 0 | Status: SHIPPED | OUTPATIENT
Start: 2019-06-06 | End: 2019-06-11 | Stop reason: SDUPTHER

## 2019-06-06 NOTE — TELEPHONE ENCOUNTER
Okay to switch to balsalazide-she is allergic to sulfa so sulfasalazine is not an option.  I have sent this to her [facility].  Please update us if there is a problem

## 2019-06-06 NOTE — TELEPHONE ENCOUNTER
Called pt and advised per Dr English that it is ok to swithc to balsalazide.  She is allergic to sulfa so sulfasalazine is not an option.  She has sent this to her [facility] pharmacy.  Advised pt to update us if there is any problems.     Pt verb understanding and is asking about getting her c/s scheduled.  Pt sent in OA paperwork.  Message sent to Rebecca.

## 2019-06-10 ENCOUNTER — ANTICOAGULATION VISIT (OUTPATIENT)
Dept: PHARMACY | Facility: HOSPITAL | Age: 84
End: 2019-06-10

## 2019-06-10 DIAGNOSIS — I48.0 PAROXYSMAL ATRIAL FIBRILLATION (HCC): ICD-10-CM

## 2019-06-10 LAB
INR PPP: 2.4 (ref 0.91–1.09)
PROTHROMBIN TIME: 29.3 SECONDS (ref 10–13.8)

## 2019-06-10 PROCEDURE — 36416 COLLJ CAPILLARY BLOOD SPEC: CPT

## 2019-06-10 PROCEDURE — 85610 PROTHROMBIN TIME: CPT

## 2019-06-10 NOTE — PROGRESS NOTES
Anticoagulation Clinic Progress Note    Anticoagulation Summary  As of 6/10/2019    INR goal:   2.0-3.0   TTR:   67.5 % (8.2 mo)   INR used for dosin.4 (6/10/2019)   Warfarin maintenance plan:   5 mg every Mon, Wed, Fri; 2.5 mg all other days   Weekly warfarin total:   25 mg   No change documented:   Cherelle Montague   Plan last modified:   Rusty Interiano [facility] (2019)   Next INR check:   2019   Priority:   High   Target end date:   Indefinite    Indications    Paroxysmal atrial fibrillation (CMS/HCC) [I48.0]             Anticoagulation Episode Summary     INR check location:       Preferred lab:       Send INR reminders to:    [facility]INICAL POOL    Comments:         Anticoagulation Care Providers     Provider Role Specialty Phone number    Mary Grace Culp MD Referring Cardiology [phone]    Janice Hart [facility] Responsible Pharmacy [phone]          Clinic Interview:  Patient Findings     Negatives:   Signs/symptoms of thrombosis, Signs/symptoms of bleeding,   Laboratory test error suspected, Change in health, Change in alcohol use,   Change in activity, Upcoming invasive procedure, Emergency department   visit, Upcoming dental procedure, Missed doses, Extra doses, Change in   medications, Change in diet/appetite, Hospital admission, Bruising, Other   complaints      Clinical Outcomes     Negatives:   Major bleeding event, Thromboembolic event,   Anticoagulation-related hospital admission, Anticoagulation-related ED   visit, Anticoagulation-related fatality        INR History:  Anticoagulation Monitoring 2019 2019 6/10/2019   INR 3.7 3.9 2.4   INR Date 2019 2019 6/10/2019   INR Goal 2.0-3.0 2.0-3.0 2.0-3.0   Trend Same Down Same   Last Week Total 27.5 mg 25 mg 22.5 mg   Next Week Total 25 mg 22.5 mg 25 mg   Sun 5 mg 2.5 mg 2.5 mg   Mon 5 mg 5 mg 5 mg   Tue 2.5 mg - 2.5 mg   Wed 2.5 mg () 2.5 mg () 5 mg   Thu 2.5 mg 2.5 mg 2.5 mg   Fri 5 mg 5 mg 5 mg   Sat 2.5 mg  2.5 mg 2.5 mg   Visit Report - - -   Some recent data might be hidden       Plan:  1. INR is therapeutic today- see above in Anticoagulation Summary.   Will instruct Brittany S Christiana to continue their warfarin regimen- see above in Anticoagulation Summary.  2. Follow up in 2 weeks.  3. Patient declines warfarin refills.  4. Verbal and written information provided. Patient expresses understanding and has no further questions at this time.    Cherelle Montague

## 2019-06-11 ENCOUNTER — TELEPHONE (OUTPATIENT)
Dept: GASTROENTEROLOGY | Facility: CLINIC | Age: 84
End: 2019-06-11

## 2019-06-11 RX ORDER — BALSALAZIDE DISODIUM 750 MG/1
CAPSULE ORAL
Qty: 810 CAPSULE | Refills: 1 | Status: SHIPPED | OUTPATIENT
Start: 2019-06-11 | End: 2019-09-10

## 2019-06-11 NOTE — TELEPHONE ENCOUNTER
----- Message from Ewelina Mcfadden sent at 6/11/2019 10:35 AM EDT -----  Regarding: pt called   Contact: [phone]  Pt is calling, gave this number for us to call to clarify dosage     [facility] [phone]

## 2019-06-11 NOTE — TELEPHONE ENCOUNTER
Liz completed for Balsalazide 750 mg - take 3 capsules by mouth 3x/day, #810, R1 (due for annual appt 11/19).

## 2019-06-13 ENCOUNTER — LAB (OUTPATIENT)
Dept: LAB | Facility: HOSPITAL | Age: 84
End: 2019-06-13

## 2019-06-13 DIAGNOSIS — D70.8 OTHER NEUTROPENIA (HCC): ICD-10-CM

## 2019-06-13 DIAGNOSIS — D47.2 MGUS (MONOCLONAL GAMMOPATHY OF UNKNOWN SIGNIFICANCE): ICD-10-CM

## 2019-06-13 LAB
ALBUMIN SERPL-MCNC: 4 G/DL (ref 3.5–5.2)
ALBUMIN/GLOB SERPL: 1.2 G/DL (ref 1.1–2.4)
ALP SERPL-CCNC: 63 U/L (ref 38–116)
ALT SERPL W P-5'-P-CCNC: 10 U/L (ref 0–33)
ANION GAP SERPL CALCULATED.3IONS-SCNC: 10.6 MMOL/L
AST SERPL-CCNC: 20 U/L (ref 0–32)
BASOPHILS # BLD AUTO: 0.01 10*3/MM3 (ref 0–0.2)
BASOPHILS NFR BLD AUTO: 0.4 % (ref 0–1.5)
BILIRUB SERPL-MCNC: 1.8 MG/DL (ref 0.2–1.2)
BUN BLD-MCNC: 15 MG/DL (ref 6–20)
BUN/CREAT SERPL: 16.9 (ref 7.3–30)
CALCIUM SPEC-SCNC: 10 MG/DL (ref 8.5–10.2)
CHLORIDE SERPL-SCNC: 106 MMOL/L (ref 98–107)
CO2 SERPL-SCNC: 27.4 MMOL/L (ref 22–29)
CREAT BLD-MCNC: 0.89 MG/DL (ref 0.6–1.1)
DEPRECATED RDW RBC AUTO: 54.4 FL (ref 37–54)
EOSINOPHIL # BLD AUTO: 0 10*3/MM3 (ref 0–0.4)
EOSINOPHIL NFR BLD AUTO: 0 % (ref 0.3–6.2)
ERYTHROCYTE [DISTWIDTH] IN BLOOD BY AUTOMATED COUNT: 14.3 % (ref 12.3–15.4)
GFR SERPL CREATININE-BSD FRML MDRD: 73 ML/MIN/1.73
GLOBULIN UR ELPH-MCNC: 3.3 GM/DL (ref 1.8–3.5)
GLUCOSE BLD-MCNC: 122 MG/DL (ref 74–124)
HCT VFR BLD AUTO: 37.9 % (ref 34–46.6)
HGB BLD-MCNC: 12 G/DL (ref 12–15.9)
IMM GRANULOCYTES # BLD AUTO: 0.01 10*3/MM3 (ref 0–0.05)
IMM GRANULOCYTES NFR BLD AUTO: 0.4 % (ref 0–0.5)
LYMPHOCYTES # BLD AUTO: 1.32 10*3/MM3 (ref 0.7–3.1)
LYMPHOCYTES NFR BLD AUTO: 47 % (ref 19.6–45.3)
MCH RBC QN AUTO: 32.4 PG (ref 26.6–33)
MCHC RBC AUTO-ENTMCNC: 31.7 G/DL (ref 31.5–35.7)
MCV RBC AUTO: 102.4 FL (ref 79–97)
MONOCYTES # BLD AUTO: 0.33 10*3/MM3 (ref 0.1–0.9)
MONOCYTES NFR BLD AUTO: 11.7 % (ref 5–12)
NEUTROPHILS # BLD AUTO: 1.14 10*3/MM3 (ref 1.7–7)
NEUTROPHILS NFR BLD AUTO: 40.5 % (ref 42.7–76)
NRBC BLD AUTO-RTO: 0 /100 WBC (ref 0–0.2)
PLATELET # BLD AUTO: 181 10*3/MM3 (ref 140–450)
PMV BLD AUTO: 8.8 FL (ref 6–12)
POTASSIUM BLD-SCNC: 3.8 MMOL/L (ref 3.5–4.7)
PROT SERPL-MCNC: 7.3 G/DL (ref 6.3–8)
RBC # BLD AUTO: 3.7 10*6/MM3 (ref 3.77–5.28)
SODIUM BLD-SCNC: 144 MMOL/L (ref 134–145)
WBC NRBC COR # BLD: 2.81 10*3/MM3 (ref 3.4–10.8)

## 2019-06-13 PROCEDURE — 80053 COMPREHEN METABOLIC PANEL: CPT

## 2019-06-13 PROCEDURE — 36415 COLL VENOUS BLD VENIPUNCTURE: CPT | Performed by: INTERNAL MEDICINE

## 2019-06-13 PROCEDURE — 85025 COMPLETE CBC W/AUTO DIFF WBC: CPT | Performed by: INTERNAL MEDICINE

## 2019-06-14 LAB
ALBUMIN SERPL-MCNC: 3.5 G/DL (ref 2.9–4.4)
ALBUMIN/GLOB SERPL: 1.1 {RATIO} (ref 0.7–1.7)
ALPHA1 GLOB FLD ELPH-MCNC: 0.2 G/DL (ref 0–0.4)
ALPHA2 GLOB SERPL ELPH-MCNC: 0.6 G/DL (ref 0.4–1)
B-GLOBULIN SERPL ELPH-MCNC: 1.3 G/DL (ref 0.7–1.3)
GAMMA GLOB SERPL ELPH-MCNC: 1.2 G/DL (ref 0.4–1.8)
GLOBULIN SER CALC-MCNC: 3.3 G/DL (ref 2.2–3.9)
IGA SERPL-MCNC: 709 MG/DL (ref 64–422)
IGG SERPL-MCNC: 1161 MG/DL (ref 700–1600)
IGM SERPL-MCNC: 58 MG/DL (ref 26–217)
INTERPRETATION SERPL IEP-IMP: ABNORMAL
KAPPA LC SERPL-MCNC: 40.2 MG/L (ref 3.3–19.4)
KAPPA LC/LAMBDA SER: 1 {RATIO} (ref 0.26–1.65)
LAMBDA LC FREE SERPL-MCNC: 40.4 MG/L (ref 5.7–26.3)
Lab: ABNORMAL
M-SPIKE: 0.4 G/DL
PROT SERPL-MCNC: 6.8 G/DL (ref 6–8.5)

## 2019-06-17 ENCOUNTER — TELEPHONE (OUTPATIENT)
Dept: ORTHOPEDIC SURGERY | Facility: CLINIC | Age: 84
End: 2019-06-17

## 2019-06-17 NOTE — TELEPHONE ENCOUNTER
Patient says that she dropped off a form for a handicapped placard on 6/14. Patient is wanting to know if it has been addressed, would like for someone to call her regarding the matter.

## 2019-06-17 NOTE — TELEPHONE ENCOUNTER
I saw her once for shoulder arthritis.  I can't give her a handicap placard for that.  If so, I need to see her for that in order to approve the handicap placard.

## 2019-06-18 ENCOUNTER — TELEPHONE (OUTPATIENT)
Dept: GASTROENTEROLOGY | Facility: CLINIC | Age: 84
End: 2019-06-18

## 2019-06-18 NOTE — TELEPHONE ENCOUNTER
Call to [facility] and spoke with Yanick - transferred to Pharmacist, Carmen Jones.  Advise per Dr English that ok to fill.      Carmen R&V.    Advise Carmen that pt will be out of lialda in 4 days - need asap.  States will ship today.    Call to pt to advise of above.   Verb understanding.

## 2019-06-18 NOTE — TELEPHONE ENCOUNTER
Call from pt.   has not yet received Balsalazide and will run out of lialda in 4 days.  [facility] called her yesterday and advised that need further info from this office.    Call to [facility] @ [phone] and spoke with Yanick.  States cross sensitivity noted with salicylates and want to make sure DR English ok with filling.    Message to Dr English.

## 2019-06-20 ENCOUNTER — OFFICE VISIT (OUTPATIENT)
Dept: ONCOLOGY | Facility: CLINIC | Age: 84
End: 2019-06-20

## 2019-06-20 ENCOUNTER — APPOINTMENT (OUTPATIENT)
Dept: LAB | Facility: HOSPITAL | Age: 84
End: 2019-06-20

## 2019-06-20 VITALS
RESPIRATION RATE: 18 BRPM | HEART RATE: 76 BPM | HEIGHT: 64 IN | OXYGEN SATURATION: 97 % | WEIGHT: 231.8 LBS | DIASTOLIC BLOOD PRESSURE: 64 MMHG | SYSTOLIC BLOOD PRESSURE: 102 MMHG | TEMPERATURE: 97.8 F | BODY MASS INDEX: 39.57 KG/M2

## 2019-06-20 DIAGNOSIS — D70.8 OTHER NEUTROPENIA (HCC): ICD-10-CM

## 2019-06-20 DIAGNOSIS — K51.30 ULCERATIVE RECTOSIGMOIDITIS WITHOUT COMPLICATION (HCC): ICD-10-CM

## 2019-06-20 DIAGNOSIS — D47.2 MGUS (MONOCLONAL GAMMOPATHY OF UNKNOWN SIGNIFICANCE): Primary | ICD-10-CM

## 2019-06-20 PROCEDURE — 99214 OFFICE O/P EST MOD 30 MIN: CPT | Performed by: INTERNAL MEDICINE

## 2019-06-20 PROCEDURE — G0463 HOSPITAL OUTPT CLINIC VISIT: HCPCS | Performed by: INTERNAL MEDICINE

## 2019-06-20 RX ORDER — MESALAMINE 1.2 G/1
4800 TABLET, DELAYED RELEASE ORAL DAILY
Qty: 360 TABLET | Refills: 3 | OUTPATIENT
Start: 2019-06-20

## 2019-06-20 NOTE — PROGRESS NOTES
Subjective     REASON FOR FOLLOW UP:   1. CHRONIC LEUKOPENIA/ Neutropenia  2. IgA Lambda MGUS  3. Ulcerative Colitis  4. Positive ALEC    HISTORY OF PRESENT ILLNESS:  The patient is a 84 y.o. year old female who is here for follow up of the above noted  issuse.  She had actually been evaluated by Dr. Choi in our practice in 2014 for leukopenia.  At that time Dr. Choi had sent a peripheral blood flow cytometry which did not show any abnormal populations of white cells.      She is here today for routine six-month follow-up and monitoring of her blood counts and monoclonal gammopathy..    She had a repeat serum protein electrophoresis performed last week that showed stable IgA monoclonal spike and adequate iron stores.  Her white count remains low but stable and overall she appears to be doing very well.    History of Present Illness     ALLERGIES:    Allergies   Allergen Reactions   • Codeine Hallucinations   • Amitriptyline Rash   • Carisoprodol-Aspirin-Codeine Unknown (See Comments)   • Tramadol Unknown (See Comments)     heart races    • Amoxicillin-Pot Clavulanate Rash   • Aspirin Unknown (See Comments)     Patient doesn't know why   • Bactrim [Sulfamethoxazole-Trimethoprim] Rash   • Iodinated Diagnostic Agents Rash   • Latex Rash   • Naproxen Rash   • Nsaids Unknown (See Comments)     unkknown   • Soma Compound With Codeine [Carisoprodol-Aspirin-Codeine] Rash   • Sulfa Antibiotics Rash          Review of Systems   Constitutional: Negative for activity change, appetite change, fatigue, fever and unexpected weight change.   HENT: Negative for hearing loss, nosebleeds, trouble swallowing and voice change.    Eyes: Negative for visual disturbance.   Respiratory: Negative for cough, shortness of breath and wheezing.    Cardiovascular: Negative for chest pain and palpitations.   Gastrointestinal: Negative for abdominal pain, nausea and vomiting.   Genitourinary: Negative for difficulty urinating, frequency, hematuria  "and urgency.   Musculoskeletal: Positive for arthralgias and gait problem. Negative for back pain and neck pain.   Skin: Negative for rash.   Neurological: Negative for dizziness, seizures, syncope and headaches.        Balance issues.   Hematological: Negative for adenopathy. Does not bruise/bleed easily.   Psychiatric/Behavioral: Negative for behavioral problems. The patient is not nervous/anxious.       Objective     Vitals:    06/20/19 0942   BP: 102/64   Pulse: 76   Resp: 18   Temp: 97.8 °F (36.6 °C)   TempSrc: Oral   SpO2: 97%   Weight: 105 kg (231 lb 12.8 oz)   Height: 162.6 cm (64.02\")   PainSc: 0-No pain     Current Status 6/20/2019   ECOG score 1       Physical Exam   Constitutional: She is oriented to person, place, and time. She appears well-developed and well-nourished. No distress.   HENT:   Head: Normocephalic.   Eyes: Conjunctivae and EOM are normal. Pupils are equal, round, and reactive to light. No scleral icterus.   Neck: Normal range of motion. Neck supple. No JVD present. No thyromegaly present.   Cardiovascular: Normal rate and regular rhythm. Exam reveals no gallop and no friction rub.   Murmur heard.  Pulmonary/Chest: Effort normal and breath sounds normal. She has no wheezes. She has no rales.   Abdominal: Soft. She exhibits no distension and no mass. There is no tenderness.   Musculoskeletal: Normal range of motion. She exhibits edema. She exhibits no deformity.   Lymphadenopathy:     She has no cervical adenopathy.   Neurological: She is alert and oriented to person, place, and time. She has normal reflexes. No cranial nerve deficit.   Ambulates with a cane.   Skin: Skin is warm and dry. No rash noted. No erythema.   Psychiatric: She has a normal mood and affect. Her behavior is normal. Judgment normal.         RECENT LABS:  Hematology WBC   Date Value Ref Range Status   06/13/2019 2.81 (L) 3.40 - 10.80 10*3/mm3 Final     RBC   Date Value Ref Range Status   06/13/2019 3.70 (L) 3.77 - 5.28 " 10*6/mm3 Final     Hemoglobin   Date Value Ref Range Status   06/13/2019 12.0 12.0 - 15.9 g/dL Final     Hematocrit   Date Value Ref Range Status   06/13/2019 37.9 34.0 - 46.6 % Final     Platelets   Date Value Ref Range Status   06/13/2019 181 140 - 450 10*3/mm3 Final        Lab Results   Component Value Date    GLUCOSE 122 06/13/2019    BUN 15 06/13/2019    CREATININE 0.89 06/13/2019    EGFRIFNONA 51 (L) 01/02/2018    EGFRIFAFRI 73 06/13/2019    BCR 16.9 06/13/2019    K 3.8 06/13/2019    CO2 27.4 06/13/2019    CALCIUM 10.0 06/13/2019    PROTENTOTREF 6.8 06/13/2019    ALBUMIN 4.00 06/13/2019    ALBUMIN 3.5 06/13/2019    LABIL2 1.1 06/13/2019    AST 20 06/13/2019    ALT 10 06/13/2019       Lab Results   Component Value Date    IRON 86 01/02/2019    TIBC 322 01/02/2019    FERRITIN 80.50 01/02/2019       SPEP/DMITRIY 6/13/2019  IgG 700 - 1600 mg/dL 1161    IgA 64 - 422 mg/dL 709 Abnormally high     IgM 26 - 217 mg/dL 58    Total Protein 6.0 - 8.5 g/dL 6.8    Albumin 2.9 - 4.4 g/dL 3.5    Alpha-1-Globulin 0.0 - 0.4 g/dL 0.2    Alpha-2-Globulin 0.4 - 1.0 g/dL 0.6    Beta Globulin 0.7 - 1.3 g/dL 1.3    Gamma Globulin 0.4 - 1.8 g/dL 1.2    M-Kip Not Observed g/dL 0.4 Abnormally high     Globulin 2.2 - 3.9 g/dL 3.3    A/G Ratio 0.7 - 1.7 1.1    Immunofixation Reflex, Serum  Comment    Comment: Immunofixation shows IgA monoclonal protein with lambda light chain   specificity.     BONE MARROW BIOPSY 9/13/2017  No evidence malignancy or plasma cell excess. Absent stainable iron.    Assessment/Plan   1.  Chronic leukopenia/neutropenia without any recurring infections.  As noted above she been evaluated in our office in 2014 and at that time peripheral blood flow cytometry was normal.  I suspect this may be a combination of benign constitutional leukopenia, which is common among the  population with some contribution or worsening of leukopenia due to her Lialda for ulcerative colitis or possible autoimmune  leukopenia.  Her counts remain low but stable. Bone marrow exam on 9/13/2017 did not show any evidence of marrow pathology other than absent stainable iron.  2.  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  Her IgA level and lambda light chains have been stable over the past 6 months.  With her bone marrow showing no excess of plasma cells we will just plan on observing this for now with repeat serum protein studies every 6 months.   3.  Ulcerative colitis.  She continues to follow-up with her gastroenterologist Dr. English  4.  Positive ALEC on previous lab work.  It is possible she could also have some degree of autoimmune neutropenia.      Plan    1.  We discussed the results of the lab studies with the patient and  reassured her that monoclonal gammopathy and leukopenia are stable.  2.  She'll be scheduled for return visit in 6 months and will undergo repeat labs one week prior to that visit including CBC, serum chemistries, iron studies, and serum protein electrophoresis with immunofixation.

## 2019-06-24 ENCOUNTER — ANTICOAGULATION VISIT (OUTPATIENT)
Dept: PHARMACY | Facility: HOSPITAL | Age: 84
End: 2019-06-24

## 2019-06-24 DIAGNOSIS — I48.0 PAROXYSMAL ATRIAL FIBRILLATION (HCC): ICD-10-CM

## 2019-06-24 LAB
INR PPP: 2.7 (ref 0.91–1.09)
PROTHROMBIN TIME: 31.9 SECONDS (ref 10–13.8)

## 2019-06-24 PROCEDURE — 85610 PROTHROMBIN TIME: CPT

## 2019-06-24 PROCEDURE — 36416 COLLJ CAPILLARY BLOOD SPEC: CPT

## 2019-06-24 NOTE — PROGRESS NOTES
Anticoagulation Clinic Progress Note    Anticoagulation Summary  As of 2019    INR goal:   2.0-3.0   TTR:   69.2 % (8.7 mo)   INR used for dosin.7 (2019)   Warfarin maintenance plan:   5 mg every Mon, Wed, Fri; 2.5 mg all other days   Weekly warfarin total:   25 mg   No change documented:   Seema Lamebrt   Plan last modified:   Rusty Interiano [facility] (2019)   Next INR check:   2019   Priority:   High   Target end date:   Indefinite    Indications    Paroxysmal atrial fibrillation (CMS/HCC) [I48.0]             Anticoagulation Episode Summary     INR check location:       Preferred lab:       Send INR reminders to:    [facility]INICAL POOL    Comments:         Anticoagulation Care Providers     Provider Role Specialty Phone number    Mary Grace Culp MD Referring Cardiology [phone]    Janice Hart [facility] Responsible Pharmacy [phone]          Clinic Interview:  Patient Findings     Positives:   Change in medications    Comments:   Balsalazide 750mg 3 caps Three times a day      Clinical Outcomes     Comments:   Balsalazide 750mg 3 caps Three times a day        INR History:  Anticoagulation Monitoring 2019 6/10/2019 2019   INR 3.9 2.4 2.7   INR Date 2019 6/10/2019 2019   INR Goal 2.0-3.0 2.0-3.0 2.0-3.0   Trend Down Same Same   Last Week Total 25 mg 22.5 mg 25 mg   Next Week Total 22.5 mg 25 mg 25 mg   Sun 2.5 mg 2.5 mg 2.5 mg   Mon 5 mg 5 mg 5 mg   Tue - 2.5 mg 2.5 mg   Wed 2.5 mg () 5 mg 5 mg   Thu 2.5 mg 2.5 mg 2.5 mg   Fri 5 mg 5 mg 5 mg   Sat 2.5 mg 2.5 mg 2.5 mg   Visit Report - - -   Some recent data might be hidden       Plan:  1. INR is therapeutic today- see above in Anticoagulation Summary.   Will instruct Brittany Villeda to continue their warfarin regimen- see above in Anticoagulation Summary.  2. Follow up Friday to ensure INR not affected by Balsalazide.  3. Patient declines warfarin refills.  4. Verbal and written information provided. Patient  expresses understanding and has no further questions at this time.    Seema Lambert

## 2019-06-28 ENCOUNTER — ANTICOAGULATION VISIT (OUTPATIENT)
Dept: PHARMACY | Facility: HOSPITAL | Age: 84
End: 2019-06-28

## 2019-06-28 DIAGNOSIS — I48.0 PAROXYSMAL ATRIAL FIBRILLATION (HCC): ICD-10-CM

## 2019-06-28 LAB
INR PPP: 2.1 (ref 0.91–1.09)
PROTHROMBIN TIME: 25.3 SECONDS (ref 10–13.8)

## 2019-06-28 PROCEDURE — 36416 COLLJ CAPILLARY BLOOD SPEC: CPT

## 2019-06-28 PROCEDURE — 85610 PROTHROMBIN TIME: CPT

## 2019-06-28 NOTE — PROGRESS NOTES
Anticoagulation Clinic Progress Note    Anticoagulation Summary  As of 2019    INR goal:   2.0-3.0   TTR:   69.7 % (8.8 mo)   INR used for dosin.1 (2019)   Warfarin maintenance plan:   5 mg every Mon, Wed, Fri; 2.5 mg all other days   Weekly warfarin total:   25 mg   No change documented:   Fiorella Baires   Plan last modified:   Rusty Interiano [facility] (2019)   Next INR check:   2019   Priority:   High   Target end date:   Indefinite    Indications    Paroxysmal atrial fibrillation (CMS/HCC) [I48.0]             Anticoagulation Episode Summary     INR check location:       Preferred lab:       Send INR reminders to:    [facility] [place]    Comments:         Anticoagulation Care Providers     Provider Role Specialty Phone number    Mary Grace Culp MD Referring Cardiology [phone]    Janice Hart [facility] Responsible Pharmacy [phone]          Clinic Interview:  Patient Findings     Negatives:   Signs/symptoms of thrombosis, Signs/symptoms of bleeding,   Laboratory test error suspected, Change in health, Change in alcohol use,   Change in activity, Upcoming invasive procedure, Emergency department   visit, Upcoming dental procedure, Missed doses, Extra doses, Change in   medications, Change in diet/appetite, Hospital admission, Bruising, Other   complaints      Clinical Outcomes     Negatives:   Major bleeding event, Thromboembolic event,   Anticoagulation-related hospital admission, Anticoagulation-related ED   visit, Anticoagulation-related fatality        INR History:  Anticoagulation Monitoring 6/10/2019 2019 2019   INR 2.4 2.7 2.1   INR Date 6/10/2019 2019 2019   INR Goal 2.0-3.0 2.0-3.0 2.0-3.0   Trend Same Same Same   Last Week Total 22.5 mg 25 mg 25 mg   Next Week Total 25 mg 25 mg 25 mg   Sun 2.5 mg 2.5 mg 2.5 mg   Mon 5 mg 5 mg 5 mg   Tue 2.5 mg 2.5 mg 2.5 mg   Wed 5 mg 5 mg 5 mg   Thu 2.5 mg 2.5 mg 2.5 mg   Fri 5 mg 5 mg 5 mg   Sat 2.5 mg 2.5  mg 2.5 mg   Visit Report - - -   Some recent data might be hidden       Plan:  1. INR is therapeutic today- see above in Anticoagulation Summary.   Will instruct Brittany Villeda to continue their warfarin regimen- see above in Anticoagulation Summary.  2. Follow up in 4 weeks.  3. Patient declines warfarin refills.  4. Verbal and written information provided. Patient expresses understanding and has no further questions at this time.    Fiorella Baires

## 2019-07-09 ENCOUNTER — TELEPHONE (OUTPATIENT)
Dept: GASTROENTEROLOGY | Facility: CLINIC | Age: 84
End: 2019-07-09

## 2019-07-09 DIAGNOSIS — R19.7 DIARRHEA, UNSPECIFIED TYPE: ICD-10-CM

## 2019-07-09 DIAGNOSIS — K51.30 ULCERATIVE RECTOSIGMOIDITIS WITHOUT COMPLICATION (HCC): Primary | ICD-10-CM

## 2019-07-09 NOTE — TELEPHONE ENCOUNTER
With recent change in symptoms and recent travel will check stool studies, BMP and CBC.  Okay to use Imodium as long as she does not have fever abdominal pain

## 2019-07-09 NOTE — TELEPHONE ENCOUNTER
Call to pt.  Reports started balsalazide 3 tabs 3x/day  about 2 wks ago.  1 wk ago went to [place] for 1 wk.  Returned 7/7 and since 7/7 has been having 3-4 diarrhea stools/day, and also at night.  Denies blood, fever, abd pain.      Poor appetite.  Denies eating raw oysters, or anything outside of normal diet wile in [place].      Concerned re: onset/persistence diarrhea.    Update/concern to Dr English.

## 2019-07-09 NOTE — TELEPHONE ENCOUNTER
----- Message from Marielle Glez sent at 7/9/2019  2:32 PM EDT -----  Regarding: balsalazide  Contact: [phone]  PT COMPLAIN OF LOOSE MARCIA

## 2019-07-10 NOTE — TELEPHONE ENCOUNTER
Called pt and advised per Dr English that with recent change in symptoms and recent travel will check stool studies- culture , o and p, c diff, and labs (cbc , cmp).  Advised it is ok to use imodium as long as she is not having a fever or abd pain.    Pt verb understanding and made lab appt for tomorrow at 8a  07/11.    Labs and stools orders placed,

## 2019-07-11 LAB
BASOPHILS # BLD AUTO: 0.02 10*3/MM3 (ref 0–0.2)
BASOPHILS NFR BLD AUTO: 0.8 % (ref 0–1.5)
EOSINOPHIL # BLD AUTO: 0 10*3/MM3 (ref 0–0.4)
EOSINOPHIL NFR BLD AUTO: 0 % (ref 0.3–6.2)
ERYTHROCYTE [DISTWIDTH] IN BLOOD BY AUTOMATED COUNT: 14.1 % (ref 12.3–15.4)
HCT VFR BLD AUTO: 37.3 % (ref 34–46.6)
HGB BLD-MCNC: 11.6 G/DL (ref 12–15.9)
IMM GRANULOCYTES # BLD AUTO: 0.01 10*3/MM3 (ref 0–0.05)
IMM GRANULOCYTES NFR BLD AUTO: 0.4 % (ref 0–0.5)
LYMPHOCYTES # BLD AUTO: 1.29 10*3/MM3 (ref 0.7–3.1)
LYMPHOCYTES NFR BLD AUTO: 51.2 % (ref 19.6–45.3)
MCH RBC QN AUTO: 31.3 PG (ref 26.6–33)
MCHC RBC AUTO-ENTMCNC: 31.1 G/DL (ref 31.5–35.7)
MCV RBC AUTO: 100.5 FL (ref 79–97)
MONOCYTES # BLD AUTO: 0.26 10*3/MM3 (ref 0.1–0.9)
MONOCYTES NFR BLD AUTO: 10.3 % (ref 5–12)
NEUTROPHILS # BLD AUTO: 0.94 10*3/MM3 (ref 1.7–7)
NEUTROPHILS NFR BLD AUTO: 37.3 % (ref 42.7–76)
NRBC BLD AUTO-RTO: 0 /100 WBC (ref 0–0.2)
PLATELET # BLD AUTO: 164 10*3/MM3 (ref 140–450)
RBC # BLD AUTO: 3.71 10*6/MM3 (ref 3.77–5.28)
WBC # BLD AUTO: 2.52 10*3/MM3 (ref 3.4–10.8)

## 2019-07-12 LAB
BUN SERPL-MCNC: 13 MG/DL (ref 8–23)
BUN/CREAT SERPL: 15.5 (ref 7–25)
CALCIUM SERPL-MCNC: 9.8 MG/DL (ref 8.6–10.5)
CHLORIDE SERPL-SCNC: 105 MMOL/L (ref 98–107)
CO2 SERPL-SCNC: 29.3 MMOL/L (ref 22–29)
CREAT SERPL-MCNC: 0.84 MG/DL (ref 0.57–1)
GLUCOSE SERPL-MCNC: 97 MG/DL (ref 65–99)
POTASSIUM SERPL-SCNC: 3.7 MMOL/L (ref 3.5–5.2)
SODIUM SERPL-SCNC: 145 MMOL/L (ref 136–145)

## 2019-07-12 NOTE — PROGRESS NOTES
Date of Office Visit: 07/15/2019  Encounter Provider: SHAHID Rios  Place of Service: [facility] CARDIOLOGY  Patient Name: Brittany Villeda  :1935      Subjective:     Chief Complaint:  Follow up for atrial fibrillation    History of Present Illness:  Brittany Villeda is a pleasant 84 y.o. female who is new to me.  Outside records have been obtained and reviewed by me.     This is a patient of Dr. Culp with a history of paroxysmal atrial fibrillation, hypertension, obstructive sleep apnea, obesity, immobility, COPD, pulmonary hypertension, nonsustained ventricular tachycardia, hypertensive heart disease, diastolic dysfunction.    Previous cardiac history includes in  she had nonsustained ventricular tachycardia for which she had a stress perfusion study which was negative for ischemia.  Her echocardiogram showed normal LV size and function with moderate LVH.    2012 she had COPD exacerbation with chest pain that resolved with treatment of her COPD.    2013 she had persistent dizziness in the setting of new onset paroxysmal atrial fibrillation.  She was admitted to the hospital and apparently work-up for stroke was negative.  Symptoms actually resolved with ear manipulation and were felt to be more vestibular in nature.  With her paroxysmal atrial fibrillation she also developed bradycardia which resolved with treatment of sleep apnea and AV node blocker therapy.  She was placed on warfarin.    She also had a heme positive stool with anemia was seen by gastroenterology and EGD and colonoscopy was done.  EGD showed mild erythema but otherwise stable.  Also it was reported that she had initiated BiPAP therapy.    And 2017 she presented to the ER with chest pain and ruled out for MI.  She had a stress perfusion study that was negative for ischemia with normal systolic function.  She had Ziopatch that showed sinus rhythm with episodes of SVT that  was symptomatic.    In September 2018 she had an EKG that showed pauses in the setting of a bifascicular block.  She had 24-hour Holter monitor that showed 65 pauses that were about 3 seconds long in duration.  She was sinus rhythm throughout most of the study and the PACs that were noted were associated with complaints of palpitations.  There were 14 episodes of atrial tachycardia lasting 4 beats as well as one episode of 2-1 AV block with questionable third-degree heart block.  She had an echocardiogram showing normal systolic function, grade 1 diastolic dysfunction, borderline right ventricular enlargement, mild to moderate tricuspid valve regurgitation and pulmonary hypertension with RVSP of 48 mmHg, mildly dilated ascending aorta measured at 3.8 cm.  Pacemaker placement was recommended but the patient deferred but later was noted to have more some somatic bradycardia and after much discussion she underwent pacemaker placement on November 2018.    Patient presented the ED on 5/4/2019 with intermittent midsternal chest tightness began prior to arrival.  The pain radiated to her back and felt like she had something stuck in her throat.  She was sitting down when pain began and had eaten something about 2 hours prior to onset.  It was associated with diaphoresis and lightheadedness.  CT angiogram of the chest showed ascending aorta with a maximum AP diameter 4 cm with no dissection seen or no evidence of pulmonary emboli, thoracic and lumbar spine degenerative changes were seen.  No acute abdominal abnormality seen on CT of the abdomen pelvis.  She had a normal lipase and proBNP, troponins were negative x2.  EKG showed paced rhythm without acute changes.  It was felt that her symptoms were esophageal in nature but she was instructed to follow-up with cardiology outpatient.    She presented for ED follow-up on 5/8/2019 where she saw SHAHID Rinaldi.  Apparently her symptoms started after her son told her he  forgot her bank receipt and she developed sudden onset of chest pain pressure/heaviness radiating to the back as well as sweating, lightheadedness, shortness of breath or near syncope.  Since the ED she had had recurrence of chest discomfort but reported intermittent palpitations that she described as her heart beating fast like it was coming out of the chest.  It would mostly occur at night when she was watching the news and usually 2-3 times per day but none when she would get in bed at nighttime.  She also had chronic dyspnea on exertion that was not new or worsening.  There is no lower extremity edema, dizziness, syncope, near syncope, falls, fatigue or abnormal bleeding.  It was reported that she has a history of untreated sleep apnea.  Her pacemaker was checked in the office which showed that she had remained in atrial fibrillation since the evening of 5/6/2019 however reported only intermittent palpitations.  Her blood pressure and heart rate were well controlled in the office that day.  Her device was interrogated in the office that day and her AF burden increased from 1% to 3%.  She has been anticoagulated with Coumadin.  Because of her symptoms of palpitations Dr. Culp wanted the patient to be on Cardizem 30 mg twice daily the patient was given parameters for heart rate and blood pressure.  Stress test was also ordered because of questionable anginal symptoms while she was in the ED.  The stress test was performed on 5/29/2019 and myocardial perfusion imaging indicated normal myocardial perfusion study with no evidence of ischemia, LV ejection fraction was normal at 69% and it was consistent with a low risk study.    Milady wanted the patient to keep her scheduled July follow-up with Dr. Culp.  The patient was moved to my schedule because Dr. Culp was out of the office.  At today's visit she had her device checked which showed normal dual-chamber pacemaker function, she was atrial paced less than 1% of the  time, V paced 65% of the time.  Her atrial fibrillation burden has been 100% since her 5/8/2019 check.  She had 4 NST episodes reported with 3 EGM to review and appeared to be RVR with A. Fib.  At today's visit she is present with her daughter again.  She has overall been doing pretty well.  She does report developing lower extremity edema since starting the diltiazem.  It does improve some with elevation but she is unable to get compression stockings on them.  She went to [place] with her family and they took their meals in and she did not have any changes in her sodium intake during that time that would precipitate weight gain or leg swelling.  She denies any recurrence of chest pain or palpitations.  Her shortness of breath with walking long distances is not new changed or worsened.  She denies any dizzy spells or significant fatigue.  She denies any lightheadedness, syncope or near syncope.  She denies any PND or orthopnea.  She does have a dry cough that started this morning.  She has not had any fevers.  Her weight is up about 7 pounds on her home scale since she started the diltiazem.  He states at home her heart rate typically runs 60 to 70s.  Her blood pressure runs 110s-120s/60-70s.  She has not had any bleeding issues except for occasional bruising on her warfarin.  No reports of GI bleeding, nosebleeds or blood in the urine.      She had normal renal function on 7/11/2019    Past Medical History:   Diagnosis Date   • Anemia    • Arrhythmia    • Arthritis    • Asthma    • Bradycardia    • Chest pain    • Colitis    • COPD (chronic obstructive pulmonary disease) (CMS/[facility])    • Diastolic dysfunction    • Essential hypertension 5/12/2016   • GERD (gastroesophageal reflux disease)    • Heart block    • Hypertension    • Hypertensive heart disease    • Hyperthyroidism    • Kidney stone    • Leukopenia    • Low back pain    • MGUS (monoclonal gammopathy of unknown significance)    • Nephrolithiasis    • Obesity     • Paroxysmal atrial fibrillation (CMS/HCC)    • Peptic ulceration    • Pulmonary hypertension (CMS/HCC)    • Sleep apnea    • Trifascicular block    • Ventricular tachycardia (CMS/HCC)     nonsustained   • Vertigo      Past Surgical History:   Procedure Laterality Date   • BACK SURGERY      lumbar fusion   • CARDIAC ELECTROPHYSIOLOGY PROCEDURE N/A 11/7/2018    Procedure: Pacemaker DC new   [place];  Surgeon: Jesus Granda MD;  Location: [facility] INVASIVE LOCATION;  Service: Cardiology   • CHOLECYSTECTOMY     • COLONOSCOPY  06/16/2014    colitis, cryptitis,  tics, NBIH, TA w/low grade dysplasia   • HEMORRHOIDECTOMY     • HYSTERECTOMY     • KNEE ARTHROPLASTY     • MYOMECTOMY     • SHOULDER SURGERY     • SINUS SURGERY     • TOE NAIL AMPUTATION  03/04/2019   • TONSILLECTOMY       Outpatient Medications Prior to Visit   Medication Sig Dispense Refill   • Acetaminophen (PAIN RELIEVER PO) Take 1 tablet by mouth As Needed.     • balsalazide (COLAZAL) 750 MG capsule Take 3 capsules by mouth three times a day 810 capsule 1   • calcium carbonate (OS-ALIDA) 600 MG tablet Take 600 mg by mouth Daily.     • diltiaZEM (CARDIZEM) 30 MG tablet Take 1 tablet by mouth 2 (Two) Times a Day. 60 tablet 2   • furosemide (LASIX) 20 MG tablet TAKE 2 TABLETS IN THE MORNING  AND TAKE 1 TABLET EVERY EVENING 270 tablet 1   • gabapentin (NEURONTIN) 300 MG capsule Take 1 capsule by mouth 4 (Four) Times a Day. (Patient taking differently: Take 300 mg by mouth 3 (Three) Times a Day.) 360 capsule 1   • gentamicin (GARAMYCIN) 0.1 % cream Apply  topically to the appropriate area as directed 3 (Three) Times a Day.     • mometasone (ELOCON) 0.1 % ointment Apply 1 application topically to the appropriate area as directed 2 (Two) Times a Day As Needed. As needed     • omeprazole (priLOSEC) 20 MG capsule TAKE 1 CAPSULE EVERY DAY 90 capsule 0   • vitamin B-12 (CYANOCOBALAMIN) 1000 MCG tablet Take 1,000 mcg by mouth Daily.     • warfarin (COUMADIN)  5 MG tablet TAKE 1 TABLET EVERY DAY 90 tablet 1     No facility-administered medications prior to visit.        Allergies as of 07/15/2019 - Reviewed 07/15/2019   Allergen Reaction Noted   • Codeine Hallucinations 2017   • Amitriptyline Rash 2016   • Carisoprodol-aspirin-codeine Unknown (See Comments) 2016   • Tramadol Unknown (See Comments) 2019   • Amoxicillin-pot clavulanate Rash 2016   • Aspirin Unknown (See Comments) 2016   • Bactrim [sulfamethoxazole-trimethoprim] Rash 2016   • Iodinated diagnostic agents Rash 2016   • Latex Rash 2016   • Naproxen Rash 2016   • Nsaids Unknown (See Comments) 2016   • Soma compound with codeine [carisoprodol-aspirin-codeine] Rash 2016   • Sulfa antibiotics Rash 2016     Social History     Socioeconomic History   • Marital status:      Spouse name: Not on file   • Number of children: 10   • Years of education: High School   • Highest education level: Not on file   Occupational History   • Occupation: Caregiver     Employer: RETIRED     Comment: worked for [employer]   Tobacco Use   • Smoking status: Former Smoker     Packs/day: 1.50     Years: 10.00     Pack years: 15.00     Start date:      Last attempt to quit: 1965     Years since quittin.5   • Smokeless tobacco: Never Used   • Tobacco comment: QUIT SMOKING    Substance and Sexual Activity   • Alcohol use: No     Comment: Daily caffeine use - one cup of coffee   • Drug use: No   • Sexual activity: No     Family History   Problem Relation Age of Onset   • Diabetes Mother    • Breast cancer Sister    • Kidney cancer Sister    • Heart disease Sister    • Prostate cancer Brother    • Prostate cancer Brother    • Prostate cancer Brother      Review of Systems   Constitution: Negative for chills, fever and malaise/fatigue.   HENT: Negative for ear pain, hearing loss, nosebleeds and sore throat.    Eyes: Negative for  "double vision, pain, vision loss in left eye and vision loss in right eye.   Cardiovascular: Positive for leg swelling. Negative for chest pain, claudication, dyspnea on exertion, irregular heartbeat, near-syncope, orthopnea, palpitations, paroxysmal nocturnal dyspnea and syncope.   Respiratory: Negative for cough, shortness of breath, snoring and wheezing.    Endocrine: Negative for cold intolerance and heat intolerance.   Hematologic/Lymphatic: Negative for bleeding problem.   Skin: Negative for color change, itching, rash and unusual hair distribution.   Musculoskeletal: Positive for joint pain (leg pain with walking). Negative for joint swelling.   Gastrointestinal: Positive for diarrhea (following with GI for work up). Negative for abdominal pain, hematochezia, melena, nausea and vomiting.   Genitourinary: Negative for decreased libido, frequency, hematuria, hesitancy and incomplete emptying.   Neurological: Negative for excessive daytime sleepiness, dizziness, headaches, light-headedness, loss of balance, numbness, paresthesias and seizures.   Psychiatric/Behavioral: Negative for depression.          Objective:     Vitals:    07/15/19 0854 07/15/19 0923   BP: 112/72    BP Location: Right arm    Patient Position: Sitting    Pulse: 79 70   SpO2:  98%   Weight: 110 kg (242 lb 6.4 oz)    Height: 162.6 cm (64\")      Body mass index is 41.61 kg/m².    PHYSICAL EXAM:  Physical Exam   Constitutional: She is oriented to person, place, and time. She appears well-developed and well-nourished. No distress.   Morbidly obese, uses rollator walker chair   HENT:   Head: Normocephalic and atraumatic.   Eyes: Conjunctivae and EOM are normal.   Wears glasses   Neck: No JVD present. Carotid bruit is not present.   Cardiovascular: Normal rate, regular rhythm, normal heart sounds and intact distal pulses.   No murmur heard.  Pulses:       Radial pulses are 2+ on the right side, and 2+ on the left side.        Dorsalis pedis pulses " are 2+ on the right side, and 2+ on the left side.        Posterior tibial pulses are 2+ on the right side, and 2+ on the left side.   3+ bilateral pedal/lower extremity edema to shins   Pulmonary/Chest: Effort normal and breath sounds normal. No accessory muscle usage. No tachypnea. No respiratory distress. She has no decreased breath sounds. She has no wheezes. She has no rhonchi. She has no rales. She exhibits no tenderness.   Left chest PPM   Abdominal: Soft. Bowel sounds are normal. She exhibits no distension. There is no tenderness. There is no rebound and no guarding.   Obese   Neurological: She is alert and oriented to person, place, and time.   Skin: Skin is warm, dry and intact. Bruising (scattered small bruises on arms) noted. She is not diaphoretic. No erythema.   Psychiatric: She has a normal mood and affect. Her speech is normal and behavior is normal. Judgment and thought content normal. Cognition and memory are normal.   Nursing note and vitals reviewed.        ECG 12 Lead  Date/Time: 7/15/2019 9:04 AM  Performed by: Beatrice Hernandez APRN  Authorized by: Beatrice Hernandez APRN   Comparison: compared with previous ECG from 5/8/2019  Comparison to previous ECG: Twave changes in V1-V3 less pronounsed.   Rhythm: atrial fibrillation  Ectopy: unifocal PVCs and infrequent PVCs  Rate: normal  BPM: 79  Conduction: right bundle branch block and left anterior fascicular block  T inversion: V1, V2 and V3  QRS axis: left  Other findings: non-specific ST-T wave changes    Clinical impression: abnormal EKG  Comments: Indication: Atrial fibrillation now persistent            Assessment:       Diagnosis Plan   1. Paroxysmal atrial fibrillation (CMS/HCC)  ECG 12 Lead   2. Paroxysmal SVT (supraventricular tachycardia) (CMS/HCC)  ECG 12 Lead   3. Pulmonary hypertension (CMS/HCC)  ECG 12 Lead   4. HUGO (obstructive sleep apnea)  ECG 12 Lead   5. Pacemaker  ECG 12 Lead   6. Essential hypertension  ECG 12 Lead   7. Diastolic  dysfunction  ECG 12 Lead       Plan:     1. History of chest pain: Negative PET stress test December 2017.  9/2018 echocardiogram showed normal LV systolic function and wall motion.  Had not had any recurrence until 5/4/2019.  Negative troponins x2 at this time and normal proBNP 5/2019 in ED.  Patient symptoms had some typical and atypical features.  They occurred with stress and the associated symptoms were concerning for possible ischemia so stress test ordered.   5/29/19 stress test was negative for ischemia.  2. Palpitations: Because of continued palpitations multiple times a day mostly in the evening but not at bedtime and with her carisoprodol allergy they can be a cross sensitivity to beta-blockers so it was opted to try calcium channel blocker instead- diltiazem 30 mg twice daily was added in May 2019 and had resolution of palpitations.   3. History of atrial fibrillation and paroxysmal SVT: Recent increase in burden in May 2019. Has has 100% AF burden since 5/8/19.  Diltiazem added and anticoagulated with Coumadin. INR monitored by outpatient anticoagulation clinic.  No abnormal bleeding reported.  4. Sick sinus syndrome/complete heart block status post permanent pacemaker placement: Continue with routine device checks. Normal device function on today's check.  5. Hypertension: Blood pressure well controlled in the office today.  6. History of diastolic dysfunction: Grade 1 on 9/2018 echo.  Lungs clear, felt significant lower extremity edema since starting diltiazem.  Recent normal proBNP 5/2019.  Will recheck echocardiogram.  7. Ascending aortic dilation: Borderline dilation at 3.8 cm on 9/27/2018 echo, CT chest without contrast 3/29/2019 showed mildly dilated ascending thoracic aorta at 4 cm.  CTA chest without contrast 5/4/2019 showed maximum AP diameter of ascending aorta 4 cm.  8. Pulmonary hypertension: RVSP 48 mmHg 9/2018 echo.  Patient saw Dr. Tao and refused further testing for sleep  apnea.  9. Untreated obstructive sleep apnea: Intolerant of BiPAP machine. She was educated on importance of treatment.  10. Anemia: Mild Defer to heme/onc, has history of ulcerative colitis and follows with GI Dr. English.   11. Leukopenia: Defer to heme/onc  12. Diarrhea: GI is working up with her hx of ulcerative colitis and recent med changes  13.  Lower extremity edema/weight gain: No increase in shortness of breath with exertion, PND/orthopnea, or JVD, but does report dry cough starting this AM.   Will check an echocardiogram as her last one was in September 2018.  She has constantly been in atrial fibrillation since 5/8/19 even since starting the diltiazem.  We will have her stop the diltiazem at this time and monitor for recurrence of palpitations and see if her lower extremity edema improves. She was encouraged to notify us of worsening symptoms.       Atrial Fibrillation and Atrial Flutter  Assessment  • The patient has paroxysmal atrial fibrillation  • The patient's CHADS2-VASc score is 3  • A [id number] score of 2 or more is considered a high risk for a thromboembolic event    Plan  • Attempt to maintain sinus rhythm  • Continue warfarin for antithrombotic therapy, bleeding issues discussed  • Continue diltiazem for rhythm control  • Continue diltiazem for rate control      Because of her lower extremity edema since starting the diltiazem and 100% atrial fibrillation burden since early May 2019. I am going to have her stop the diltiazem and monitor her heart rate and let me know if it goes over 100 or she has recurrence of palpitations. I advised the patient that if they have not heard from our office within 2-3 days after they have completed their testing (echocardiogram) to please call our office to obtain their results.    Follow up with Dr. Culp in 3 months, unless otherwise needed sooner based on symptoms or test results.  I advised the patient to contact our office with any questions or concerns.          Your medication list           Accurate as of 7/15/19  9:23 AM. If you have any questions, ask your nurse or doctor.               CHANGE how you take these medications      Instructions Last Dose Given Next Dose Due   gabapentin 300 MG capsule  Commonly known as:  NEURONTIN  What changed:  when to take this      Take 1 capsule by mouth 4 (Four) Times a Day.          CONTINUE taking these medications      Instructions Last Dose Given Next Dose Due   balsalazide 750 MG capsule  Commonly known as:  COLAZAL      Take 3 capsules by mouth three times a day       calcium carbonate 600 MG tablet  Commonly known as:  OS-ALIDA      Take 600 mg by mouth Daily.       diltiaZEM 30 MG tablet  Commonly known as:  CARDIZEM      Take 1 tablet by mouth 2 (Two) Times a Day.       furosemide 20 MG tablet  Commonly known as:  LASIX      TAKE 2 TABLETS IN THE MORNING  AND TAKE 1 TABLET EVERY EVENING       gentamicin 0.1 % cream  Commonly known as:  GARAMYCIN      Apply  topically to the appropriate area as directed 3 (Three) Times a Day.       mometasone 0.1 % ointment  Commonly known as:  ELOCON      Apply 1 application topically to the appropriate area as directed 2 (Two) Times a Day As Needed. As needed       omeprazole 20 MG capsule  Commonly known as:  priLOSEC      TAKE 1 CAPSULE EVERY DAY       PAIN RELIEVER PO      Take 1 tablet by mouth As Needed.       vitamin B-12 1000 MCG tablet  Commonly known as:  CYANOCOBALAMIN      Take 1,000 mcg by mouth Daily.       warfarin 5 MG tablet  Commonly known as:  COUMADIN      TAKE 1 TABLET EVERY DAY              The above medication changes may not have been made by this provider.  Medication list was updated to reflect medications patient is currently taking including medication changes and discontinuations made by other healthcare providers.     I have discussed this case with Dr. Culp who agrees with the plan of care.  It has been a pleasure to participate in this patient's care.  Please feel free to contact me with any questions or concerns.     Beatrice Hernandez, APRN  07/15/2019       Dictated utilizing Dragon Dictation System.

## 2019-07-13 LAB — C DIFF TOX A+B STL QL IA: NEGATIVE

## 2019-07-15 ENCOUNTER — CLINICAL SUPPORT NO REQUIREMENTS (OUTPATIENT)
Dept: CARDIOLOGY | Facility: CLINIC | Age: 84
End: 2019-07-15

## 2019-07-15 ENCOUNTER — OFFICE VISIT (OUTPATIENT)
Dept: CARDIOLOGY | Facility: CLINIC | Age: 84
End: 2019-07-15

## 2019-07-15 VITALS
DIASTOLIC BLOOD PRESSURE: 72 MMHG | WEIGHT: 242.4 LBS | BODY MASS INDEX: 41.38 KG/M2 | OXYGEN SATURATION: 98 % | HEIGHT: 64 IN | HEART RATE: 70 BPM | SYSTOLIC BLOOD PRESSURE: 112 MMHG

## 2019-07-15 DIAGNOSIS — I44.2 COMPLETE HEART BLOCK (HCC): Primary | ICD-10-CM

## 2019-07-15 DIAGNOSIS — I48.0 PAROXYSMAL ATRIAL FIBRILLATION (HCC): Primary | ICD-10-CM

## 2019-07-15 DIAGNOSIS — I27.20 PULMONARY HYPERTENSION (HCC): ICD-10-CM

## 2019-07-15 DIAGNOSIS — I51.89 DIASTOLIC DYSFUNCTION: ICD-10-CM

## 2019-07-15 DIAGNOSIS — Z95.0 PACEMAKER: ICD-10-CM

## 2019-07-15 DIAGNOSIS — I47.1 PAROXYSMAL SVT (SUPRAVENTRICULAR TACHYCARDIA) (HCC): ICD-10-CM

## 2019-07-15 DIAGNOSIS — I10 ESSENTIAL HYPERTENSION: ICD-10-CM

## 2019-07-15 DIAGNOSIS — G47.33 OSA (OBSTRUCTIVE SLEEP APNEA): ICD-10-CM

## 2019-07-15 PROCEDURE — 99214 OFFICE O/P EST MOD 30 MIN: CPT | Performed by: NURSE PRACTITIONER

## 2019-07-15 PROCEDURE — 93000 ELECTROCARDIOGRAM COMPLETE: CPT | Performed by: NURSE PRACTITIONER

## 2019-07-15 PROCEDURE — 93280 PM DEVICE PROGR EVAL DUAL: CPT | Performed by: INTERNAL MEDICINE

## 2019-07-16 LAB
BACTERIA SPEC CULT: NORMAL
BACTERIA SPEC CULT: NORMAL
CAMPYLOBACTER STL CULT: NORMAL
E COLI SXT STL QL IA: NEGATIVE
O+P SPEC MICRO: NORMAL
O+P STL CONC: NORMAL
SALM + SHIG STL CULT: NORMAL

## 2019-07-19 ENCOUNTER — HOSPITAL ENCOUNTER (EMERGENCY)
Facility: HOSPITAL | Age: 84
Discharge: HOME | End: 2019-07-19
Attending: EMERGENCY MEDICINE | Admitting: EMERGENCY MEDICINE

## 2019-07-19 ENCOUNTER — APPOINTMENT (OUTPATIENT)
Dept: GENERAL RADIOLOGY | Facility: HOSPITAL | Age: 84
End: 2019-07-19

## 2019-07-19 ENCOUNTER — CLINICAL SUPPORT NO REQUIREMENTS (OUTPATIENT)
Dept: CARDIOLOGY | Facility: CLINIC | Age: 84
End: 2019-07-19

## 2019-07-19 VITALS
WEIGHT: 242 LBS | OXYGEN SATURATION: 97 % | SYSTOLIC BLOOD PRESSURE: 123 MMHG | BODY MASS INDEX: 41.32 KG/M2 | RESPIRATION RATE: 18 BRPM | HEIGHT: 64 IN | DIASTOLIC BLOOD PRESSURE: 59 MMHG | TEMPERATURE: 99.1 F | HEART RATE: 70 BPM

## 2019-07-19 DIAGNOSIS — J06.9 VIRAL URI WITH COUGH: Primary | ICD-10-CM

## 2019-07-19 DIAGNOSIS — I48.0 PAROXYSMAL ATRIAL FIBRILLATION (HCC): Primary | ICD-10-CM

## 2019-07-19 LAB
ALBUMIN SERPL-MCNC: 3.7 G/DL (ref 3.5–5.2)
ALBUMIN/GLOB SERPL: 1.2 G/DL
ALP SERPL-CCNC: 67 U/L (ref 39–117)
ALT SERPL W P-5'-P-CCNC: 8 U/L (ref 1–33)
ANION GAP SERPL CALCULATED.3IONS-SCNC: 9.1 MMOL/L (ref 5–15)
AST SERPL-CCNC: 19 U/L (ref 1–32)
BASOPHILS # BLD AUTO: 0.02 10*3/MM3 (ref 0–0.2)
BASOPHILS NFR BLD AUTO: 0.8 % (ref 0–1.5)
BILIRUB SERPL-MCNC: 1.2 MG/DL (ref 0.2–1.2)
BUN BLD-MCNC: 15 MG/DL (ref 8–23)
BUN/CREAT SERPL: 16 (ref 7–25)
CALCIUM SPEC-SCNC: 10 MG/DL (ref 8.6–10.5)
CHLORIDE SERPL-SCNC: 103 MMOL/L (ref 98–107)
CO2 SERPL-SCNC: 27.9 MMOL/L (ref 22–29)
CREAT BLD-MCNC: 0.94 MG/DL (ref 0.57–1)
DEPRECATED RDW RBC AUTO: 51.9 FL (ref 37–54)
EOSINOPHIL # BLD AUTO: 0.01 10*3/MM3 (ref 0–0.4)
EOSINOPHIL NFR BLD AUTO: 0.4 % (ref 0.3–6.2)
ERYTHROCYTE [DISTWIDTH] IN BLOOD BY AUTOMATED COUNT: 13.9 % (ref 12.3–15.4)
GFR SERPL CREATININE-BSD FRML MDRD: 69 ML/MIN/1.73
GLOBULIN UR ELPH-MCNC: 3.2 GM/DL
GLUCOSE BLD-MCNC: 94 MG/DL (ref 65–99)
HCT VFR BLD AUTO: 36.5 % (ref 34–46.6)
HGB BLD-MCNC: 11.5 G/DL (ref 12–15.9)
HOLD SPECIMEN: NORMAL
HOLD SPECIMEN: NORMAL
IMM GRANULOCYTES # BLD AUTO: 0.01 10*3/MM3 (ref 0–0.05)
IMM GRANULOCYTES NFR BLD AUTO: 0.4 % (ref 0–0.5)
INR PPP: 1.8 (ref 0.9–1.1)
LYMPHOCYTES # BLD AUTO: 1.43 10*3/MM3 (ref 0.7–3.1)
LYMPHOCYTES NFR BLD AUTO: 54.4 % (ref 19.6–45.3)
MAGNESIUM SERPL-MCNC: 1.7 MG/DL (ref 1.6–2.4)
MCH RBC QN AUTO: 31.9 PG (ref 26.6–33)
MCHC RBC AUTO-ENTMCNC: 31.5 G/DL (ref 31.5–35.7)
MCV RBC AUTO: 101.1 FL (ref 79–97)
MONOCYTES # BLD AUTO: 0.33 10*3/MM3 (ref 0.1–0.9)
MONOCYTES NFR BLD AUTO: 12.5 % (ref 5–12)
NEUTROPHILS # BLD AUTO: 0.83 10*3/MM3 (ref 1.7–7)
NEUTROPHILS NFR BLD AUTO: 31.5 % (ref 42.7–76)
NRBC BLD AUTO-RTO: 0 /100 WBC (ref 0–0.2)
NT-PROBNP SERPL-MCNC: 1268 PG/ML (ref 5–1800)
PLATELET # BLD AUTO: 130 10*3/MM3 (ref 140–450)
PMV BLD AUTO: 9.5 FL (ref 6–12)
POTASSIUM BLD-SCNC: 3.9 MMOL/L (ref 3.5–5.2)
PROT SERPL-MCNC: 6.9 G/DL (ref 6–8.5)
PROTHROMBIN TIME: 20.6 SECONDS (ref 11.7–14.2)
RBC # BLD AUTO: 3.61 10*6/MM3 (ref 3.77–5.28)
SODIUM BLD-SCNC: 140 MMOL/L (ref 136–145)
TROPONIN T SERPL-MCNC: <0.01 NG/ML (ref 0–0.03)
WBC NRBC COR # BLD: 2.63 10*3/MM3 (ref 3.4–10.8)
WHOLE BLOOD HOLD SPECIMEN: NORMAL
WHOLE BLOOD HOLD SPECIMEN: NORMAL

## 2019-07-19 PROCEDURE — 99284 EMERGENCY DEPT VISIT MOD MDM: CPT

## 2019-07-19 PROCEDURE — 83735 ASSAY OF MAGNESIUM: CPT | Performed by: EMERGENCY MEDICINE

## 2019-07-19 PROCEDURE — 80053 COMPREHEN METABOLIC PANEL: CPT | Performed by: EMERGENCY MEDICINE

## 2019-07-19 PROCEDURE — 84484 ASSAY OF TROPONIN QUANT: CPT | Performed by: EMERGENCY MEDICINE

## 2019-07-19 PROCEDURE — 71046 X-RAY EXAM CHEST 2 VIEWS: CPT

## 2019-07-19 PROCEDURE — 93010 ELECTROCARDIOGRAM REPORT: CPT | Performed by: INTERNAL MEDICINE

## 2019-07-19 PROCEDURE — 94799 UNLISTED PULMONARY SVC/PX: CPT

## 2019-07-19 PROCEDURE — 83880 ASSAY OF NATRIURETIC PEPTIDE: CPT | Performed by: EMERGENCY MEDICINE

## 2019-07-19 PROCEDURE — 85025 COMPLETE CBC W/AUTO DIFF WBC: CPT | Performed by: EMERGENCY MEDICINE

## 2019-07-19 PROCEDURE — 93005 ELECTROCARDIOGRAM TRACING: CPT | Performed by: EMERGENCY MEDICINE

## 2019-07-19 PROCEDURE — 94640 AIRWAY INHALATION TREATMENT: CPT

## 2019-07-19 PROCEDURE — 85610 PROTHROMBIN TIME: CPT | Performed by: EMERGENCY MEDICINE

## 2019-07-19 RX ORDER — ALBUTEROL SULFATE 2.5 MG/3ML
2.5 SOLUTION RESPIRATORY (INHALATION) ONCE
Status: COMPLETED | OUTPATIENT
Start: 2019-07-19 | End: 2019-07-19

## 2019-07-19 RX ORDER — SODIUM CHLORIDE 0.9 % (FLUSH) 0.9 %
10 SYRINGE (ML) INJECTION AS NEEDED
Status: DISCONTINUED | OUTPATIENT
Start: 2019-07-19 | End: 2019-07-19 | Stop reason: HOSPADM

## 2019-07-19 RX ORDER — GUAIFENESIN DEXTROMETHORPHAN HYDROBROMIDE ORAL SOLUTION 10; 100 MG/5ML; MG/5ML
5 SOLUTION ORAL EVERY 12 HOURS
Qty: 100 ML | Refills: 0 | Status: SHIPPED | OUTPATIENT
Start: 2019-07-19 | End: 2019-08-19

## 2019-07-19 RX ORDER — ALBUTEROL SULFATE 90 UG/1
2 AEROSOL, METERED RESPIRATORY (INHALATION) EVERY 6 HOURS PRN
Qty: 1 INHALER | Refills: 0 | Status: SHIPPED | OUTPATIENT
Start: 2019-07-19 | End: 2021-07-25

## 2019-07-19 RX ADMIN — ALBUTEROL SULFATE 2.5 MG: 2.5 SOLUTION RESPIRATORY (INHALATION) at 16:35

## 2019-07-22 ENCOUNTER — ANTICOAGULATION VISIT (OUTPATIENT)
Dept: PHARMACY | Facility: HOSPITAL | Age: 84
End: 2019-07-22

## 2019-07-22 DIAGNOSIS — I48.0 PAROXYSMAL ATRIAL FIBRILLATION (HCC): ICD-10-CM

## 2019-07-22 LAB
INR PPP: 1.8 (ref 0.91–1.09)
PROTHROMBIN TIME: 21.1 SECONDS (ref 10–13.8)

## 2019-07-22 PROCEDURE — 36416 COLLJ CAPILLARY BLOOD SPEC: CPT

## 2019-07-22 PROCEDURE — 85610 PROTHROMBIN TIME: CPT

## 2019-07-22 PROCEDURE — G0463 HOSPITAL OUTPT CLINIC VISIT: HCPCS

## 2019-07-22 RX ORDER — GABAPENTIN 300 MG/1
CAPSULE ORAL
Qty: 360 CAPSULE | Refills: 1 | Status: SHIPPED | OUTPATIENT
Start: 2019-07-22 | End: 2019-09-10

## 2019-07-22 NOTE — PROGRESS NOTES
Anticoagulation Clinic Progress Note    Anticoagulation Summary  As of 2019    INR goal:   2.0-3.0   TTR:   66.3 % (9.6 mo)   INR used for dosin.8! (2019)   Warfarin maintenance plan:   2.5 mg every Tue, Thu, Sat; 5 mg all other days   Weekly warfarin total:   27.5 mg   Plan last modified:   Rusty Interiano [facility] (2019)   Next INR check:   2019   Priority:   High   Target end date:   Indefinite    Indications    Paroxysmal atrial fibrillation (CMS/HCC) [I48.0]             Anticoagulation Episode Summary     INR check location:       Preferred lab:       Send INR reminders to:    [facility]INICAL POOL    Comments:         Anticoagulation Care Providers     Provider Role Specialty Phone number    Mary Grace Culp MD Referring Cardiology [phone]    Janice Hart [facility] Responsible Pharmacy [phone]          Clinic Interview:  Patient Findings     Positives:   Emergency department visit, Change in medications    Negatives:   Signs/symptoms of thrombosis, Signs/symptoms of bleeding,   Laboratory test error suspected, Change in health, Change in alcohol use,   Change in activity, Upcoming invasive procedure, Upcoming dental   procedure, Missed doses, Extra doses, Change in diet/appetite, Hospital   admission, Bruising, Other complaints    Comments:   Recent ED visit for respiratory sx - no interacting meds.   Stopped diltiazem 7/15 per APRN Hernandez's guidance. Previously decreased   warfarin dose as a result of diltiazem intxn.      Clinical Outcomes     Negatives:   Major bleeding event, Thromboembolic event,   Anticoagulation-related hospital admission, Anticoagulation-related ED   visit, Anticoagulation-related fatality    Comments:   Recent ED visit for respiratory sx - no interacting meds.   Stopped diltiazem 7/15 per APRN Hernandez's guidance. Previously decreased   warfarin dose as a result of diltiazem intxn.        INR History:  Anticoagulation Monitoring 2019    INR 2.7 2.1 1.8   INR Date 6/24/2019 6/28/2019 7/22/2019   INR Goal 2.0-3.0 2.0-3.0 2.0-3.0   Trend Same Same Up   Last Week Total 25 mg 25 mg 25 mg   Next Week Total 25 mg 25 mg 27.5 mg   Sun 2.5 mg 2.5 mg 5 mg   Mon 5 mg 5 mg 5 mg   Tue 2.5 mg 2.5 mg 2.5 mg   Wed 5 mg 5 mg 5 mg   Thu 2.5 mg 2.5 mg 2.5 mg   Fri 5 mg 5 mg 5 mg   Sat 2.5 mg 2.5 mg 2.5 mg   Visit Report - - -   Some recent data might be hidden       Plan:  1. INR is Subtherapeutic today- see above in Anticoagulation Summary.  Will instruct Brittany Villeda to Increase their warfarin regimen- see above in Anticoagulation Summary.  2. Follow up in 1 week per pt to coordinate with another appt  3. Patient declines warfarin refills.  4. Verbal and written information provided. Patient expresses understanding and has no further questions at this time.    Rusty Interiano [facility]

## 2019-07-23 ENCOUNTER — TELEPHONE (OUTPATIENT)
Dept: GASTROENTEROLOGY | Facility: CLINIC | Age: 84
End: 2019-07-23

## 2019-07-24 NOTE — TELEPHONE ENCOUNTER
rec staying off the balsalazide until her office f/u in August and we can decide on further management at that time

## 2019-07-24 NOTE — TELEPHONE ENCOUNTER
Called pt and advised per Dr English that her repeat labs are stable      Pt verb understanding and reports that she quit taking the balsalazide for about a wk and a half and her bowels are fine.  Pt states she feels pretty good.  She has had a resp infection. Advised will update Dr English.

## 2019-07-24 NOTE — TELEPHONE ENCOUNTER
Called pt and advised per Dr English to stay off the balsalazide until her office f/u in August and we can decide on further management at that time.  Pt verb understanding.

## 2019-07-29 ENCOUNTER — HOSPITAL ENCOUNTER (OUTPATIENT)
Dept: CARDIOLOGY | Facility: HOSPITAL | Age: 84
Discharge: HOME | End: 2019-07-29
Admitting: NURSE PRACTITIONER

## 2019-07-29 ENCOUNTER — ANTICOAGULATION VISIT (OUTPATIENT)
Dept: PHARMACY | Facility: HOSPITAL | Age: 84
End: 2019-07-29

## 2019-07-29 VITALS
BODY MASS INDEX: 41.32 KG/M2 | DIASTOLIC BLOOD PRESSURE: 70 MMHG | WEIGHT: 242 LBS | HEIGHT: 64 IN | SYSTOLIC BLOOD PRESSURE: 122 MMHG | HEART RATE: 76 BPM

## 2019-07-29 DIAGNOSIS — I51.89 DIASTOLIC DYSFUNCTION: ICD-10-CM

## 2019-07-29 DIAGNOSIS — I47.1 PAROXYSMAL SVT (SUPRAVENTRICULAR TACHYCARDIA) (HCC): ICD-10-CM

## 2019-07-29 DIAGNOSIS — Z95.0 PACEMAKER: ICD-10-CM

## 2019-07-29 DIAGNOSIS — G47.33 OSA (OBSTRUCTIVE SLEEP APNEA): ICD-10-CM

## 2019-07-29 DIAGNOSIS — I48.0 PAROXYSMAL ATRIAL FIBRILLATION (HCC): ICD-10-CM

## 2019-07-29 DIAGNOSIS — I10 ESSENTIAL HYPERTENSION: ICD-10-CM

## 2019-07-29 DIAGNOSIS — I27.20 PULMONARY HYPERTENSION (HCC): ICD-10-CM

## 2019-07-29 LAB
AORTIC ROOT ANNULUS: 2.2 CM
ASCENDING AORTA: 3.5 CM
BH CV ECHO MEAS - ACS: 1.9 CM
BH CV ECHO MEAS - AO MAX PG (FULL): 0.4 MMHG
BH CV ECHO MEAS - AO MAX PG: 4 MMHG
BH CV ECHO MEAS - AO MEAN PG (FULL): 0.13 MMHG
BH CV ECHO MEAS - AO MEAN PG: 2.3 MMHG
BH CV ECHO MEAS - AO V2 MAX: 100.2 CM/SEC
BH CV ECHO MEAS - AO V2 MEAN: 72.1 CM/SEC
BH CV ECHO MEAS - AO V2 VTI: 20.3 CM
BH CV ECHO MEAS - AVA(I,A): 3.2 CM^2
BH CV ECHO MEAS - AVA(I,D): 3.2 CM^2
BH CV ECHO MEAS - AVA(V,A): 3.3 CM^2
BH CV ECHO MEAS - AVA(V,D): 3.3 CM^2
BH CV ECHO MEAS - BSA(HAYCOCK): 2.3 M^2
BH CV ECHO MEAS - BSA: 2.1 M^2
BH CV ECHO MEAS - BZI_BMI: 41.5 KILOGRAMS/M^2
BH CV ECHO MEAS - BZI_METRIC_HEIGHT: 162.6 CM
BH CV ECHO MEAS - BZI_METRIC_WEIGHT: 109.8 KG
BH CV ECHO MEAS - EDV(MOD-SP2): 73 ML
BH CV ECHO MEAS - EDV(MOD-SP4): 77 ML
BH CV ECHO MEAS - EDV(TEICH): 98 ML
BH CV ECHO MEAS - EF(CUBED): 65.5 %
BH CV ECHO MEAS - EF(MOD-BP): 57 %
BH CV ECHO MEAS - EF(MOD-SP2): 56.2 %
BH CV ECHO MEAS - EF(MOD-SP4): 55.8 %
BH CV ECHO MEAS - EF(TEICH): 57.1 %
BH CV ECHO MEAS - ESV(MOD-SP2): 32 ML
BH CV ECHO MEAS - ESV(MOD-SP4): 34 ML
BH CV ECHO MEAS - ESV(TEICH): 42 ML
BH CV ECHO MEAS - FS: 29.9 %
BH CV ECHO MEAS - IVS/LVPW: 1
BH CV ECHO MEAS - IVSD: 0.98 CM
BH CV ECHO MEAS - LAT PEAK E' VEL: 11 CM/SEC
BH CV ECHO MEAS - LV DIASTOLIC VOL/BSA (35-75): 36.3 ML/M^2
BH CV ECHO MEAS - LV MASS(C)D: 153.1 GRAMS
BH CV ECHO MEAS - LV MASS(C)DI: 72.2 GRAMS/M^2
BH CV ECHO MEAS - LV MAX PG: 3.6 MMHG
BH CV ECHO MEAS - LV MEAN PG: 2.1 MMHG
BH CV ECHO MEAS - LV SYSTOLIC VOL/BSA (12-30): 16 ML/M^2
BH CV ECHO MEAS - LV V1 MAX: 95.1 CM/SEC
BH CV ECHO MEAS - LV V1 MEAN: 69.9 CM/SEC
BH CV ECHO MEAS - LV V1 VTI: 18.9 CM
BH CV ECHO MEAS - LVIDD: 4.6 CM
BH CV ECHO MEAS - LVIDS: 3.2 CM
BH CV ECHO MEAS - LVLD AP2: 7 CM
BH CV ECHO MEAS - LVLD AP4: 6.6 CM
BH CV ECHO MEAS - LVLS AP2: 6.3 CM
BH CV ECHO MEAS - LVLS AP4: 6.1 CM
BH CV ECHO MEAS - LVOT AREA (M): 3.5 CM^2
BH CV ECHO MEAS - LVOT AREA: 3.5 CM^2
BH CV ECHO MEAS - LVOT DIAM: 2.1 CM
BH CV ECHO MEAS - LVPWD: 0.96 CM
BH CV ECHO MEAS - MED PEAK E' VEL: 7 CM/SEC
BH CV ECHO MEAS - MR MAX PG: 14.4 MMHG
BH CV ECHO MEAS - MR MAX VEL: 190 CM/SEC
BH CV ECHO MEAS - MV DEC SLOPE: 325.6 CM/SEC^2
BH CV ECHO MEAS - MV DEC TIME: 0.22 SEC
BH CV ECHO MEAS - MV E MAX VEL: 77.1 CM/SEC
BH CV ECHO MEAS - MV MAX PG: 1.8 MMHG
BH CV ECHO MEAS - MV MEAN PG: 0.71 MMHG
BH CV ECHO MEAS - MV P1/2T MAX VEL: 77.1 CM/SEC
BH CV ECHO MEAS - MV P1/2T: 69.4 MSEC
BH CV ECHO MEAS - MV V2 MAX: 66.5 CM/SEC
BH CV ECHO MEAS - MV V2 MEAN: 39.6 CM/SEC
BH CV ECHO MEAS - MV V2 VTI: 16.3 CM
BH CV ECHO MEAS - MVA P1/2T LCG: 2.9 CM^2
BH CV ECHO MEAS - MVA(P1/2T): 3.2 CM^2
BH CV ECHO MEAS - MVA(VTI): 4 CM^2
BH CV ECHO MEAS - PA ACC TIME: 0.1 SEC
BH CV ECHO MEAS - PA MAX PG (FULL): 1.3 MMHG
BH CV ECHO MEAS - PA MAX PG: 2.6 MMHG
BH CV ECHO MEAS - PA PR(ACCEL): 34.6 MMHG
BH CV ECHO MEAS - PA V2 MAX: 80.5 CM/SEC
BH CV ECHO MEAS - RAP SYSTOLE: 15 MMHG
BH CV ECHO MEAS - RV MAX PG: 1.3 MMHG
BH CV ECHO MEAS - RV MEAN PG: 0.71 MMHG
BH CV ECHO MEAS - RV V1 MAX: 56.3 CM/SEC
BH CV ECHO MEAS - RV V1 MEAN: 39.9 CM/SEC
BH CV ECHO MEAS - RV V1 VTI: 10.5 CM
BH CV ECHO MEAS - RVSP: 53 MMHG
BH CV ECHO MEAS - SI(CUBED): 30.3 ML/M^2
BH CV ECHO MEAS - SI(LVOT): 31 ML/M^2
BH CV ECHO MEAS - SI(MOD-SP2): 19.3 ML/M^2
BH CV ECHO MEAS - SI(MOD-SP4): 20.3 ML/M^2
BH CV ECHO MEAS - SI(TEICH): 26.4 ML/M^2
BH CV ECHO MEAS - SUP REN AO DIAM: 2.1 CM
BH CV ECHO MEAS - SV(CUBED): 64.3 ML
BH CV ECHO MEAS - SV(LVOT): 65.8 ML
BH CV ECHO MEAS - SV(MOD-SP2): 41 ML
BH CV ECHO MEAS - SV(MOD-SP4): 43 ML
BH CV ECHO MEAS - SV(TEICH): 55.9 ML
BH CV ECHO MEAS - TAPSE (>1.6): 1.3 CM2
BH CV ECHO MEAS - TR MAX VEL: 309.3 CM/SEC
BH CV ECHO MEASUREMENTS AVERAGE E/E' RATIO: 8.57
BH CV XLRA - RV BASE: 3.6 CM
BH CV XLRA - TDI S': 11 CM/SEC
INR PPP: 1.7 (ref 0.91–1.09)
LEFT ATRIUM VOLUME INDEX: 36 ML/M2
LEFT ATRIUM VOLUME: 75 CM3
LV EF 2D ECHO EST: 57 %
PROTHROMBIN TIME: 20.8 SECONDS (ref 10–13.8)
SINUS: 3.7 CM
STJ: 3.1 CM

## 2019-07-29 PROCEDURE — 93306 TTE W/DOPPLER COMPLETE: CPT

## 2019-07-29 PROCEDURE — 93306 TTE W/DOPPLER COMPLETE: CPT | Performed by: INTERNAL MEDICINE

## 2019-07-29 PROCEDURE — 36416 COLLJ CAPILLARY BLOOD SPEC: CPT

## 2019-07-29 PROCEDURE — G0463 HOSPITAL OUTPT CLINIC VISIT: HCPCS

## 2019-07-29 PROCEDURE — 85610 PROTHROMBIN TIME: CPT

## 2019-07-29 NOTE — PROGRESS NOTES
Anticoagulation Clinic Progress Note    Anticoagulation Summary  As of 2019    INR goal:   2.0-3.0   TTR:   64.8 % (9.8 mo)   INR used for dosin.7! (2019)   Warfarin maintenance plan:   2.5 mg every Tue, Sat; 5 mg all other days   Weekly warfarin total:   30 mg   Plan last modified:   Rusty Interiano [facility] (2019)   Next INR check:   2019   Priority:   High   Target end date:   Indefinite    Indications    Paroxysmal atrial fibrillation (CMS/HCC) [I48.0]             Anticoagulation Episode Summary     INR check location:       Preferred lab:       Send INR reminders to:   [facility]INICAL POOL    Comments:         Anticoagulation Care Providers     Provider Role Specialty Phone number    Mary Grace Culp MD Referring Cardiology [phone]    Janice Hart [facility] Responsible Pharmacy [phone]          Clinic Interview:  Patient Findings     Positives:   Change in medications    Negatives:   Signs/symptoms of thrombosis, Signs/symptoms of bleeding,   Laboratory test error suspected, Change in health, Change in alcohol use,   Change in activity, Upcoming invasive procedure, Emergency department   visit, Upcoming dental procedure, Missed doses, Extra doses, Change in   diet/appetite, Hospital admission, Bruising, Other complaints    Comments:   Diltiazem recently discontinued ~1.5 wks ago.       Clinical Outcomes     Negatives:   Major bleeding event, Thromboembolic event,   Anticoagulation-related hospital admission, Anticoagulation-related ED   visit, Anticoagulation-related fatality    Comments:   Diltiazem recently discontinued ~1.5 wks ago.         INR History:  Anticoagulation Monitoring 2019   INR 2.1 1.8 1.7   INR Date 2019   INR Goal 2.0-3.0 2.0-3.0 2.0-3.0   Trend Same Up Up   Last Week Total 25 mg 25 mg 27.5 mg   Next Week Total 25 mg 27.5 mg 32.5 mg   Sun 2.5 mg 5 mg 5 mg   Mon 5 mg 5 mg 7.5 mg (); Otherwise 5 mg   Tue 2.5  mg 2.5 mg 2.5 mg   Wed 5 mg 5 mg 5 mg   Thu 2.5 mg 2.5 mg 5 mg   Fri 5 mg 5 mg 5 mg   Sat 2.5 mg 2.5 mg 2.5 mg   Visit Report - - -   Some recent data might be hidden       Plan:  1. INR is Subtherapeutic today- see above in Anticoagulation Summary.  Will instruct Brittany Villeda to Increase their warfarin regimen- see above in Anticoagulation Summary.  2. Follow up in 2 weeks  3. Patient declines warfarin refills.  4. Verbal and written information provided. Patient expresses understanding and has no further questions at this time.    Rusty Interiano [facility]

## 2019-07-30 ENCOUNTER — TELEPHONE (OUTPATIENT)
Dept: CARDIOLOGY | Facility: CLINIC | Age: 84
End: 2019-07-30

## 2019-07-30 NOTE — TELEPHONE ENCOUNTER
I called and spoke with patient.  Since stopping diltiazem her lower extremity edema is greatly improved.  It is not completely gone but she is now able to wear compression stockings.  She has not had any further weight gain.  Her blood pressure at home is running 120s/70s and heart rate is ranging 60s to 70s.  She does not have heart rates higher than the 70s she states.  She denies any worsening shortness of breath, chest pain, palpitations, PND or orthopnea.  She again adamantly states that she will not wear a CPAP mask for sleep apnea as I discussed her echocardiogram results with her.  I am going to discuss her echocardiogram results further with Dr. Culp and will let her know if any additional changes will be made.  She demonstrated understanding of the plan of care and would like me to update 1 of her daughters as well.

## 2019-07-31 ENCOUNTER — TELEPHONE (OUTPATIENT)
Dept: CARDIOLOGY | Facility: CLINIC | Age: 84
End: 2019-07-31

## 2019-07-31 NOTE — TELEPHONE ENCOUNTER
I called and spoke with Dr. Culp regarding the patient's recent echocardiogram and worsening tricuspid regurgitation and reported area of apical inferior hypokinesis that is new.  She will review the echocardiogram results and let me know if she would like any further testing.

## 2019-08-01 ENCOUNTER — TELEPHONE (OUTPATIENT)
Dept: CARDIOLOGY | Facility: CLINIC | Age: 84
End: 2019-08-01

## 2019-08-01 DIAGNOSIS — I27.20 PULMONARY HYPERTENSION (HCC): Primary | ICD-10-CM

## 2019-08-01 DIAGNOSIS — R93.1 REGIONAL WALL MOTION ABNORMALITY OF HEART: ICD-10-CM

## 2019-08-01 NOTE — TELEPHONE ENCOUNTER
I spoke with Norma Leblanc in echo after discussing with Dr. Culp to repeat images at no charge with contrast to reassess wall motion.  I contacted the patient as well as her daughter and reviewed the echo findings and the plan for repeat images at no charge except for the cost of IV contrast.  They were in agreement to come back in for repeat images.  I also reviewed her pulmonary hypertension and moderate to severe tricuspid regurgitation and again reiterated the importance of having her sleep apnea treated.  Her daughter states she will discuss this with her mother again however her mother was very adamant recently about refusing treatment despite the risks.

## 2019-08-02 ENCOUNTER — HOSPITAL ENCOUNTER (OUTPATIENT)
Dept: CARDIOLOGY | Facility: HOSPITAL | Age: 84
Discharge: HOME | End: 2019-08-02
Admitting: NURSE PRACTITIONER

## 2019-08-02 VITALS — OXYGEN SATURATION: 95 %

## 2019-08-02 DIAGNOSIS — R93.1 REGIONAL WALL MOTION ABNORMALITY OF HEART: ICD-10-CM

## 2019-08-02 DIAGNOSIS — I27.20 PULMONARY HYPERTENSION (HCC): ICD-10-CM

## 2019-08-02 LAB
BH CV ECHO MEAS - BSA(HAYCOCK): 2.2 M^2
BH CV ECHO MEAS - BSA: 2.1 M^2
BH CV ECHO MEAS - BZI_BMI: 39.3 KILOGRAMS/M^2
BH CV ECHO MEAS - BZI_METRIC_HEIGHT: 162.6 CM
BH CV ECHO MEAS - BZI_METRIC_WEIGHT: 103.9 KG
BH CV ECHO MEAS - EDV(MOD-SP2): 91 ML
BH CV ECHO MEAS - EDV(MOD-SP4): 111 ML
BH CV ECHO MEAS - EF(MOD-BP): 56 %
BH CV ECHO MEAS - EF(MOD-SP2): 54.9 %
BH CV ECHO MEAS - EF(MOD-SP4): 56.8 %
BH CV ECHO MEAS - ESV(MOD-SP2): 41 ML
BH CV ECHO MEAS - ESV(MOD-SP4): 48 ML
BH CV ECHO MEAS - LV DIASTOLIC VOL/BSA (35-75): 53.6 ML/M^2
BH CV ECHO MEAS - LV SYSTOLIC VOL/BSA (12-30): 23.2 ML/M^2
BH CV ECHO MEAS - LVLD AP2: 6.6 CM
BH CV ECHO MEAS - LVLD AP4: 6.5 CM
BH CV ECHO MEAS - LVLS AP2: 5.8 CM
BH CV ECHO MEAS - LVLS AP4: 5.2 CM
BH CV ECHO MEAS - SI(MOD-SP2): 24.1 ML/M^2
BH CV ECHO MEAS - SI(MOD-SP4): 30.4 ML/M^2
BH CV ECHO MEAS - SV(MOD-SP2): 50 ML
BH CV ECHO MEAS - SV(MOD-SP4): 63 ML
MAXIMAL PREDICTED HEART RATE: 136 BPM
STRESS TARGET HR: 116 BPM

## 2019-08-02 PROCEDURE — 93308 TTE F-UP OR LMTD: CPT

## 2019-08-02 PROCEDURE — 25010000002 PERFLUTREN (DEFINITY) 8.476 MG IN SODIUM CHLORIDE 0.9 % 10 ML INJECTION: Performed by: NURSE PRACTITIONER

## 2019-08-02 RX ADMIN — PERFLUTREN 1.5 ML: 6.52 INJECTION, SUSPENSION INTRAVENOUS at 09:24

## 2019-08-08 ENCOUNTER — TELEPHONE (OUTPATIENT)
Dept: CARDIOLOGY | Facility: CLINIC | Age: 84
End: 2019-08-08

## 2019-08-08 NOTE — TELEPHONE ENCOUNTER
I notified patient of repeat limited echo results that I reviewed with Dr. Culp.  The area of wall motion abnormality was not present on this study with Definity.  No changes to patient's regimen.  She will keep her 10/8/2019 appointment with Dr. Culp unless otherwise needed sooner she was encouraged to call.  She demonstrated understanding.

## 2019-08-12 ENCOUNTER — ANTICOAGULATION VISIT (OUTPATIENT)
Dept: PHARMACY | Facility: HOSPITAL | Age: 84
End: 2019-08-12

## 2019-08-12 DIAGNOSIS — I48.0 PAROXYSMAL ATRIAL FIBRILLATION (HCC): ICD-10-CM

## 2019-08-12 LAB
INR PPP: 2.1 (ref 0.91–1.09)
PROTHROMBIN TIME: 24.8 SECONDS (ref 10–13.8)

## 2019-08-12 PROCEDURE — 85610 PROTHROMBIN TIME: CPT

## 2019-08-12 PROCEDURE — 36416 COLLJ CAPILLARY BLOOD SPEC: CPT

## 2019-08-12 NOTE — PROGRESS NOTES
Anticoagulation Clinic Progress Note    Anticoagulation Summary  As of 2019    INR goal:   2.0-3.0   TTR:   63.0 % (10.3 mo)   INR used for dosin.1 (2019)   Warfarin maintenance plan:   2.5 mg every Tue, Sat; 5 mg all other days   Weekly warfarin total:   30 mg   No change documented:   Efraín Sotelo, Pharmacy Intern   Plan last modified:   Rusty Interiano [facility] (2019)   Next INR check:   2019   Priority:   High   Target end date:   Indefinite    Indications    Paroxysmal atrial fibrillation (CMS/[facility]) [I48.0]             Anticoagulation Episode Summary     INR check location:       Preferred lab:       Send INR reminders to:    [facility] CLINICAL POOL    Comments:         Anticoagulation Care Providers     Provider Role Specialty Phone number    Mary Grace Culp MD Referring Cardiology [phone]    Janice Hart [facility] Responsible Pharmacy [phone]          Clinic Interview:  Patient Findings     Positives:   Change in medications    Negatives:   Signs/symptoms of thrombosis, Signs/symptoms of bleeding,   Laboratory test error suspected, Change in health, Change in alcohol use,   Change in activity, Upcoming invasive procedure, Emergency department   visit, Upcoming dental procedure, Missed doses, Extra doses, Change in   diet/appetite, Hospital admission, Bruising, Other complaints    Comments:   Stopped taking balasalazide immediately before stopping   diltiazem few weeks ago.       Clinical Outcomes     Negatives:   Major bleeding event, Thromboembolic event,   Anticoagulation-related hospital admission, Anticoagulation-related ED   visit, Anticoagulation-related fatality    Comments:   Stopped taking balasalazide immediately before stopping   diltiazem few weeks ago.         INR History:  Anticoagulation Monitoring 2019   INR 1.8 1.7 2.1   INR Date 2019   INR Goal 2.0-3.0 2.0-3.0 2.0-3.0   Trend Up Up Same   Last Week Total 25 mg  27.5 mg 30 mg   Next Week Total 27.5 mg 32.5 mg 30 mg   Sun 5 mg 5 mg 5 mg   Mon 5 mg 7.5 mg (7/29); Otherwise 5 mg 5 mg   Tue 2.5 mg 2.5 mg 2.5 mg   Wed 5 mg 5 mg 5 mg   Thu 2.5 mg 5 mg 5 mg   Fri 5 mg 5 mg 5 mg   Sat 2.5 mg 2.5 mg 2.5 mg   Visit Report - - -   Some recent data might be hidden       Plan:  1. INR is Therapeutic today- see above in Anticoagulation Summary.  Will instruct Brittany Villeda to Continue their warfarin regimen- see above in Anticoagulation Summary.  2. Follow up in 2 weeks  3. Patient declines warfarin refills.  4. Verbal and written information provided. Patient expresses understanding and has no further questions at this time.    Efraín Sotelo, Pharmacy Intern

## 2019-08-12 NOTE — PROGRESS NOTES
I have supervised and reviewed the notes, assessments, and/or procedures performed by Efraín Sotelo, PharmD Candidate. I concur with his documentation of this patient.    Yasmeen Pichardo [facility]

## 2019-08-19 ENCOUNTER — OFFICE VISIT (OUTPATIENT)
Dept: GASTROENTEROLOGY | Facility: CLINIC | Age: 84
End: 2019-08-19

## 2019-08-19 VITALS
BODY MASS INDEX: 39.03 KG/M2 | SYSTOLIC BLOOD PRESSURE: 118 MMHG | TEMPERATURE: 98.5 F | WEIGHT: 228.6 LBS | DIASTOLIC BLOOD PRESSURE: 72 MMHG | HEIGHT: 64 IN

## 2019-08-19 DIAGNOSIS — K21.9 GASTROESOPHAGEAL REFLUX DISEASE WITHOUT ESOPHAGITIS: ICD-10-CM

## 2019-08-19 DIAGNOSIS — K51.30 ULCERATIVE RECTOSIGMOIDITIS WITHOUT COMPLICATION (HCC): Primary | ICD-10-CM

## 2019-08-19 DIAGNOSIS — D12.6 ADENOMATOUS POLYP OF COLON, UNSPECIFIED PART OF COLON: ICD-10-CM

## 2019-08-19 PROCEDURE — 99213 OFFICE O/P EST LOW 20 MIN: CPT | Performed by: INTERNAL MEDICINE

## 2019-08-19 NOTE — PROGRESS NOTES
Chief Complaint   Patient presents with   • Ulcerative Colitis   • Pre-op Exam       Brittany Villeda is a  84 y.o. 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  Last EGD 2014 - no esophageal abnormalities.  Last seen in the office 11/18.      Given stable on Lialda for years.  Her insurance mandated switch to balsalazide in June.  After a few weeks on this medication she had persistent diarrhea and urgency.  Stool studies were negative.  We stopped the balsalazide and her symptoms resolved.    Her last colonoscopy was in 2000 for the time of her diagnosis.  She had a tubular adenoma removed at that time as well which was 5 mm.    She is doing well-denies abdominal pain, no rectal bleeding, no diarrhea  HPI  Past Medical History:   Diagnosis Date   • Anemia    • Arrhythmia    • Arthritis    • Asthma    • Bradycardia    • Chest pain    • Colitis    • COPD (chronic obstructive pulmonary disease) (CMS/HCC)    • Diastolic dysfunction    • Essential hypertension 5/12/2016   • GERD (gastroesophageal reflux disease)    • Heart block    • Hypertension    • Hypertensive heart disease    • Hyperthyroidism    • Kidney stone    • Leukopenia    • Low back pain    • MGUS (monoclonal gammopathy of unknown significance)    • Nephrolithiasis    • Obesity    • Paroxysmal atrial fibrillation (CMS/HCC)    • Peptic ulceration    • Pulmonary hypertension (CMS/HCC)    • Sleep apnea    • Trifascicular block    • Ventricular tachycardia (CMS/HCC)     nonsustained   • Vertigo      Past Surgical History:   Procedure Laterality Date   • BACK SURGERY      lumbar fusion   • CARDIAC ELECTROPHYSIOLOGY PROCEDURE N/A 11/7/2018    Procedure: Pacemaker DC new   [place];  Surgeon: Jesus Granda MD;  Location: [facility] INVASIVE LOCATION;  Service: Cardiology   • CHOLECYSTECTOMY     • COLONOSCOPY  06/16/2014    colitis, cryptitis,  tics, NBIH, TA w/low grade dysplasia   • HEMORRHOIDECTOMY      • HYSTERECTOMY     • KNEE ARTHROPLASTY     • MYOMECTOMY     • SHOULDER SURGERY     • SINUS SURGERY     • TOE NAIL AMPUTATION  03/04/2019   • TONSILLECTOMY         Current Outpatient Medications:   •  Acetaminophen (PAIN RELIEVER PO), Take 1 tablet by mouth As Needed., Disp: , Rfl:   •  albuterol sulfate  (90 Base) MCG/ACT inhaler, Inhale 2 puffs Every 6 (Six) Hours As Needed for Wheezing., Disp: 1 inhaler, Rfl: 0  •  calcium carbonate (OS-ALIDA) 600 MG tablet, Take 600 mg by mouth Daily., Disp: , Rfl:   •  furosemide (LASIX) 20 MG tablet, TAKE 2 TABLETS IN THE MORNING  AND TAKE 1 TABLET EVERY EVENING, Disp: 270 tablet, Rfl: 1  •  gabapentin (NEURONTIN) 300 MG capsule, TAKE 1 CAPSULE FOUR TIMES DAILY, Disp: 360 capsule, Rfl: 1  •  omeprazole (priLOSEC) 20 MG capsule, TAKE 1 CAPSULE EVERY DAY, Disp: 90 capsule, Rfl: 0  •  vitamin B-12 (CYANOCOBALAMIN) 1000 MCG tablet, Take 1,000 mcg by mouth Daily., Disp: , Rfl:   •  warfarin (COUMADIN) 5 MG tablet, TAKE 1 TABLET EVERY DAY (Patient taking differently: TAKE 1 TABLET T, W, F, half tablet M,Th,S,S), Disp: 90 tablet, Rfl: 1  •  balsalazide (COLAZAL) 750 MG capsule, Take 3 capsules by mouth three times a day, Disp: 810 capsule, Rfl: 1  •  diltiaZEM (CARDIZEM) 30 MG tablet, Take 1 tablet by mouth 2 (Two) Times a Day., Disp: 60 tablet, Rfl: 2  PRN Meds:.  Allergies   Allergen Reactions   • Codeine Hallucinations   • Amitriptyline Rash   • Carisoprodol-Aspirin-Codeine Unknown (See Comments)   • Tramadol Unknown (See Comments)     heart races    • Amoxicillin-Pot Clavulanate Rash   • Aspirin Unknown (See Comments)     Patient doesn't know why   • Bactrim [Sulfamethoxazole-Trimethoprim] Rash   • Iodinated Diagnostic Agents Rash   • Latex Rash   • Naproxen Rash   • Nsaids Unknown (See Comments)     unkknown   • Soma Compound With Codeine [Carisoprodol-Aspirin-Codeine] Rash   • Sulfa Antibiotics Rash     Social History     Socioeconomic History   • Marital status:       Spouse name: Not on file   • Number of children: 10   • Years of education: High School   • Highest education level: Not on file   Occupational History   • Occupation: Caregiver     Employer: RETIRED     Comment: worked for [employer]   Tobacco Use   • Smoking status: Former Smoker     Packs/day: 1.50     Years: 10.00     Pack years: 15.00     Start date:      Last attempt to quit:      Years since quittin.6   • Smokeless tobacco: Never Used   • Tobacco comment: QUIT SMOKING    Substance and Sexual Activity   • Alcohol use: No     Comment: Daily caffeine use - one cup of coffee   • Drug use: Defer   • Sexual activity: Defer     Family History   Problem Relation Age of Onset   • Diabetes Mother    • Breast cancer Sister    • Kidney cancer Sister    • Heart disease Sister    • Prostate cancer Brother    • Prostate cancer Brother    • Prostate cancer Brother      Review of Systems   Constitutional: Positive for unexpected weight change. Negative for appetite change.   Respiratory: Negative for shortness of breath.    Cardiovascular: Positive for leg swelling.   Gastrointestinal: Negative for abdominal pain, blood in stool and constipation.   Musculoskeletal: Positive for gait problem.   All other systems reviewed and are negative.    Vitals:    19   BP: 118/72   Temp: 98.5 °F (36.9 °C)         19   Weight: 104 kg (228 lb 9.6 oz)     Physical Exam   Constitutional: She appears well-developed and well-nourished.   HENT:   Head: Normocephalic and atraumatic.   Eyes: No scleral icterus.   Abdominal: Soft. She exhibits no distension. There is no tenderness.   Musculoskeletal: She exhibits edema.   Neurological: She is alert.   Skin: Skin is warm and dry.   Psychiatric: She has a normal mood and affect.     No images are attached to the encounter.  Diagnoses and all orders for this visit:    Ulcerative rectosigmoiditis without complication  (CMS/HCC)    Gastroesophageal reflux disease without esophagitis    Adenomatous polyp of colon, unspecified part of colon         · Will hold off on any treatment for now given that she is symptomatic but I have asked her to call if she should develop problems  · Sheis intolerant of balsalazide so should she develop symptoms again, an alternate agent would be necessary (tolerated lialda in the past)  · Given her age and comorbidities, would not pursue further surveillance colonoscopy

## 2019-08-19 NOTE — PATIENT INSTRUCTIONS
· Will hold off on any treatment for now given that she is symptomatic but I have asked her to call if she should develop problems  · Given her age and comorbidities, would not pursue further surveillance colonoscopy

## 2019-08-26 ENCOUNTER — ANTICOAGULATION VISIT (OUTPATIENT)
Dept: PHARMACY | Facility: HOSPITAL | Age: 84
End: 2019-08-26

## 2019-08-26 DIAGNOSIS — I48.0 PAROXYSMAL ATRIAL FIBRILLATION (HCC): ICD-10-CM

## 2019-08-26 LAB
INR PPP: 1.9 (ref 0.91–1.09)
PROTHROMBIN TIME: 22.5 SECONDS (ref 10–13.8)

## 2019-08-26 PROCEDURE — G0463 HOSPITAL OUTPT CLINIC VISIT: HCPCS

## 2019-08-26 PROCEDURE — 85610 PROTHROMBIN TIME: CPT

## 2019-08-26 PROCEDURE — 36416 COLLJ CAPILLARY BLOOD SPEC: CPT

## 2019-08-26 NOTE — PROGRESS NOTES
Anticoagulation Clinic Progress Note    Anticoagulation Summary  As of 2019    INR goal:   2.0-3.0   TTR:   62.4 % (10.8 mo)   INR used for dosin.9! (2019)   Warfarin maintenance plan:   2.5 mg every Sat; 5 mg all other days   Weekly warfarin total:   32.5 mg   Plan last modified:   Rusty Interiano RPH (2019)   Next INR check:   2019   Priority:   High   Target end date:   Indefinite    Indications    Paroxysmal atrial fibrillation (CMS/HCC) [I48.0]             Anticoagulation Episode Summary     INR check location:       Preferred lab:       Send INR reminders to:   [facility]INICAL POOL    Comments:         Anticoagulation Care Providers     Provider Role Specialty Phone number    Mary Grace Culp MD Referring Cardiology [phone]          Clinic Interview:  Patient Findings     Negatives:   Signs/symptoms of thrombosis, Signs/symptoms of bleeding,   Laboratory test error suspected, Change in health, Change in alcohol use,   Change in activity, Upcoming invasive procedure, Emergency department   visit, Upcoming dental procedure, Missed doses, Extra doses, Change in   medications, Change in diet/appetite, Hospital admission, Bruising, Other   complaints      Clinical Outcomes     Negatives:   Major bleeding event, Thromboembolic event,   Anticoagulation-related hospital admission, Anticoagulation-related ED   visit, Anticoagulation-related fatality        INR History:  Anticoagulation Monitoring 2019   INR 1.7 2.1 1.9   INR Date 2019   INR Goal 2.0-3.0 2.0-3.0 2.0-3.0   Trend Up Same Up   Last Week Total 27.5 mg 30 mg 30 mg   Next Week Total 32.5 mg 30 mg 32.5 mg   Sun 5 mg 5 mg 5 mg   Mon 7.5 mg (); Otherwise 5 mg 5 mg 5 mg   Tue 2.5 mg 2.5 mg 5 mg   Wed 5 mg 5 mg 5 mg   Thu 5 mg 5 mg 5 mg   Fri 5 mg 5 mg 5 mg   Sat 2.5 mg 2.5 mg 2.5 mg   Visit Report - - -   Some recent data might be hidden       Plan:  1. INR is Subtherapeutic  today- see above in Anticoagulation Summary.  Will instruct Brittany Villeda to Increase their warfarin regimen- see above in Anticoagulation Summary.  2. Follow up in 2 weeks  3. Patient declines warfarin refills.  4. Verbal and written information provided. Patient expresses understanding and has no further questions at this time.    Rusty Interiano, [facility]

## 2019-08-28 RX ORDER — OMEPRAZOLE 20 MG/1
CAPSULE, DELAYED RELEASE ORAL
Qty: 90 CAPSULE | Refills: 0 | Status: SHIPPED | OUTPATIENT
Start: 2019-08-28 | End: 2019-10-25

## 2019-09-09 ENCOUNTER — ANTICOAGULATION VISIT (OUTPATIENT)
Dept: PHARMACY | Facility: HOSPITAL | Age: 84
End: 2019-09-09

## 2019-09-09 DIAGNOSIS — I48.0 PAROXYSMAL ATRIAL FIBRILLATION (HCC): ICD-10-CM

## 2019-09-09 LAB
INR PPP: 2.4 (ref 0.91–1.09)
PROTHROMBIN TIME: 29.2 SECONDS (ref 10–13.8)

## 2019-09-09 PROCEDURE — 85610 PROTHROMBIN TIME: CPT

## 2019-09-09 PROCEDURE — 36416 COLLJ CAPILLARY BLOOD SPEC: CPT

## 2019-09-09 NOTE — PROGRESS NOTES
Anticoagulation Clinic Progress Note    Anticoagulation Summary  As of 2019    INR goal:   2.0-3.0   TTR:   63.1 % (11.2 mo)   INR used for dosin.4 (2019)   Warfarin maintenance plan:   2.5 mg every Sat; 5 mg all other days   Weekly warfarin total:   32.5 mg   No change documented:   Cherelle Montague   Plan last modified:   Rusty Interiano [facility] (2019)   Next INR check:   2019   Priority:   High   Target end date:   Indefinite    Indications    Paroxysmal atrial fibrillation (CMS/[facility]) [I48.0]             Anticoagulation Episode Summary     INR check location:       Preferred lab:       Send INR reminders to:    ANJ[facility] CLINICAL POOL    Comments:         Anticoagulation Care Providers     Provider Role Specialty Phone number    Mary Grace Culp MD Referring Cardiology [phone]          Clinic Interview:  Patient Findings     Negatives:   Signs/symptoms of thrombosis, Signs/symptoms of bleeding,   Laboratory test error suspected, Change in health, Change in alcohol use,   Change in activity, Upcoming invasive procedure, Emergency department   visit, Upcoming dental procedure, Missed doses, Extra doses, Change in   medications, Change in diet/appetite, Hospital admission, Bruising, Other   complaints      Clinical Outcomes     Negatives:   Major bleeding event, Thromboembolic event,   Anticoagulation-related hospital admission, Anticoagulation-related ED   visit, Anticoagulation-related fatality        INR History:  Anticoagulation Monitoring 2019   INR 2.1 1.9 2.4   INR Date 2019   INR Goal 2.0-3.0 2.0-3.0 2.0-3.0   Trend Same Up Same   Last Week Total 30 mg 30 mg 32.5 mg   Next Week Total 30 mg 32.5 mg 32.5 mg   Sun 5 mg 5 mg 5 mg   Mon 5 mg 5 mg 5 mg   Tue 2.5 mg 5 mg 5 mg   Wed 5 mg 5 mg 5 mg   Thu 5 mg 5 mg 5 mg   Fri 5 mg 5 mg 5 mg   Sat 2.5 mg 2.5 mg 2.5 mg   Visit Report - - -   Some recent data might be hidden       Plan:  1. INR is  therapeutic today- see above in Anticoagulation Summary.   Will instruct Brittanyyuri Villeda to continue their warfarin regimen- see above in Anticoagulation Summary.  2. Follow up in 2 weeks.  3. Patient declines warfarin refills.  4. Verbal and written information provided. Patient expresses understanding and has no further questions at this time.    Cherelle Montague

## 2019-09-10 ENCOUNTER — OFFICE VISIT (OUTPATIENT)
Dept: FAMILY MEDICINE CLINIC | Facility: CLINIC | Age: 84
End: 2019-09-10

## 2019-09-10 VITALS
HEART RATE: 68 BPM | WEIGHT: 228.1 LBS | HEIGHT: 64 IN | OXYGEN SATURATION: 98 % | SYSTOLIC BLOOD PRESSURE: 123 MMHG | DIASTOLIC BLOOD PRESSURE: 73 MMHG | BODY MASS INDEX: 38.94 KG/M2 | TEMPERATURE: 97.2 F

## 2019-09-10 DIAGNOSIS — Z00.00 MEDICARE ANNUAL WELLNESS VISIT, SUBSEQUENT: Primary | ICD-10-CM

## 2019-09-10 DIAGNOSIS — Z23 NEED FOR IMMUNIZATION AGAINST INFLUENZA: ICD-10-CM

## 2019-09-10 DIAGNOSIS — M54.41 CHRONIC MIDLINE LOW BACK PAIN WITH RIGHT-SIDED SCIATICA: ICD-10-CM

## 2019-09-10 DIAGNOSIS — S76.012A STRAIN OF LEFT HIP, INITIAL ENCOUNTER: ICD-10-CM

## 2019-09-10 DIAGNOSIS — I48.0 PAROXYSMAL ATRIAL FIBRILLATION (HCC): ICD-10-CM

## 2019-09-10 DIAGNOSIS — K51.30 ULCERATIVE RECTOSIGMOIDITIS WITHOUT COMPLICATION (HCC): ICD-10-CM

## 2019-09-10 DIAGNOSIS — G89.29 CHRONIC MIDLINE LOW BACK PAIN WITH RIGHT-SIDED SCIATICA: ICD-10-CM

## 2019-09-10 DIAGNOSIS — Z78.0 POSTMENOPAUSAL: ICD-10-CM

## 2019-09-10 DIAGNOSIS — I10 ESSENTIAL HYPERTENSION: ICD-10-CM

## 2019-09-10 PROCEDURE — 96160 PT-FOCUSED HLTH RISK ASSMT: CPT | Performed by: FAMILY MEDICINE

## 2019-09-10 PROCEDURE — G0008 ADMIN INFLUENZA VIRUS VAC: HCPCS | Performed by: FAMILY MEDICINE

## 2019-09-10 PROCEDURE — G0439 PPPS, SUBSEQ VISIT: HCPCS | Performed by: FAMILY MEDICINE

## 2019-09-10 PROCEDURE — 99214 OFFICE O/P EST MOD 30 MIN: CPT | Performed by: FAMILY MEDICINE

## 2019-09-10 PROCEDURE — 90653 IIV ADJUVANT VACCINE IM: CPT | Performed by: FAMILY MEDICINE

## 2019-09-10 RX ORDER — GABAPENTIN 300 MG/1
300 CAPSULE ORAL 2 TIMES DAILY
Qty: 180 CAPSULE | Refills: 1
Start: 2019-09-10 | End: 2020-01-27

## 2019-09-10 NOTE — PROGRESS NOTES
The ABCs of the Annual Wellness Visit  Subsequent Medicare Wellness Visit    Chief Complaint   Patient presents with   • Medicare Wellness-subsequent     pt is fasting    • Hip Pain     left hip pain x 1 wk NKI    • Shoulder Pain     bilateral        Subjective   History of Present Illness:  Brittany Villeda is a 84 y.o. female who presents for a Subsequent Medicare Wellness Visit.    HEALTH RISK ASSESSMENT    Recent Hospitalizations:  No hospitalization(s) within the last year.    Current Medical Providers:  Patient Care Team:  Mega Esparza MD as PCP - General (Family Medicine)  Micaela English MD as Consulting Physician (Gastroenterology)  Mary Grace Culp MD as Consulting Physician (Cardiology)  Jp Krause Jr., MD as Consulting Physician (Hematology and Oncology)  Yasmeen Pichardo RPH as Pharmacist  Micaela English MD as Referring Physician (Gastroenterology)  Devon Valadez MD as Consulting Physician (Hematology and Oncology)    Smoking Status:  Social History     Tobacco Use   Smoking Status Former Smoker   • Packs/day: 1.50   • Years: 10.00   • Pack years: 15.00   • Start date:    • Last attempt to quit:    • Years since quittin.7   Smokeless Tobacco Never Used   Tobacco Comment    QUIT SMOKING        Alcohol Consumption:  Social History     Substance and Sexual Activity   Alcohol Use No    Comment: Daily caffeine use - one cup of coffee       Depression Screen:   PHQ-2/PHQ-9 Depression Screening 9/10/2019   Little interest or pleasure in doing things 0   Feeling down, depressed, or hopeless 0   Trouble falling or staying asleep, or sleeping too much -   Feeling tired or having little energy -   Poor appetite or overeating -   Feeling bad about yourself - or that you are a failure or have let yourself or your family down -   Trouble concentrating on things, such as reading the newspaper or watching television -   Moving or speaking so slowly that other people could have noticed. Or  the opposite - being so fidgety or restless that you have been moving around a lot more than usual -   Thoughts that you would be better off dead, or of hurting yourself in some way -   Total Score 0   If you checked off any problems, how difficult have these problems made it for you to do your work, take care of things at home, or get along with other people? -       Fall Risk Screen:  BRIT Fall Risk Assessment has not been completed.    Health Habits and Functional and Cognitive Screening:  Functional & Cognitive Status 9/10/2019   Do you have difficulty preparing food and eating? No   Do you have difficulty bathing yourself, getting dressed or grooming yourself? No   Do you have difficulty using the toilet? No   Do you have difficulty moving around from place to place? No   Do you have trouble with steps or getting out of a bed or a chair? Yes   Current Diet Well Balanced Diet   Dental Exam Not up to date   Eye Exam Not up to date   Exercise (times per week) 0 times per week   Current Exercise Activities Include None   Do you need help using the phone?  No   Are you deaf or do you have serious difficulty hearing?  No   Do you need help with transportation? No   Do you need help shopping? Yes   Do you need help preparing meals?  No   Do you need help with housework?  No   Do you need help with laundry? No   Do you need help taking your medications? No   Do you need help managing money? No   Do you ever drive or ride in a car without wearing a seat belt? No   Have you felt unusual stress, anger or loneliness in the last month? No   Who do you live with? Child   If you need help, do you have trouble finding someone available to you? No   Have you been bothered in the last four weeks by sexual problems? No   Do you have difficulty concentrating, remembering or making decisions? No         Does the patient have evidence of cognitive impairment? No    Asprin use counseling:Does not need ASA (and currently is not on  it)    Age-appropriate Screening Schedule:  Refer to the list below for future screening recommendations based on patient's age, sex and/or medical conditions. Orders for these recommended tests are listed in the plan section. The patient has been provided with a written plan.    Health Maintenance   Topic Date Due   • ZOSTER VACCINE (2 of 2) 08/15/2011   • INFLUENZA VACCINE  08/01/2019   • MAMMOGRAM  03/22/2021   • TDAP/TD VACCINES (2 - Td) 01/01/2022   • COLONOSCOPY  06/16/2024   • PNEUMOCOCCAL VACCINES (65+ LOW/MEDIUM RISK)  Completed          The following portions of the patient's history were reviewed and updated as appropriate: allergies, current medications, past family history, past medical history, past social history, past surgical history and problem list.    Outpatient Medications Prior to Visit   Medication Sig Dispense Refill   • Acetaminophen (PAIN RELIEVER PO) Take 1 tablet by mouth As Needed.     • albuterol sulfate  (90 Base) MCG/ACT inhaler Inhale 2 puffs Every 6 (Six) Hours As Needed for Wheezing. 1 inhaler 0   • calcium carbonate (OS-ALIDA) 600 MG tablet Take 600 mg by mouth Daily.     • furosemide (LASIX) 20 MG tablet TAKE 2 TABLETS IN THE MORNING  AND TAKE 1 TABLET EVERY EVENING 270 tablet 1   • gabapentin (NEURONTIN) 300 MG capsule TAKE 1 CAPSULE FOUR TIMES DAILY 360 capsule 1   • omeprazole (priLOSEC) 20 MG capsule TAKE 1 CAPSULE EVERY DAY 90 capsule 0   • vitamin B-12 (CYANOCOBALAMIN) 1000 MCG tablet Take 1,000 mcg by mouth Daily.     • warfarin (COUMADIN) 5 MG tablet TAKE 1 TABLET EVERY DAY (Patient taking differently: TAKE 1 TABLET T, W, F, half tablet M,Th,S,S) 90 tablet 1   • balsalazide (COLAZAL) 750 MG capsule Take 3 capsules by mouth three times a day 810 capsule 1   • diltiaZEM (CARDIZEM) 30 MG tablet Take 1 tablet by mouth 2 (Two) Times a Day. 60 tablet 2     No facility-administered medications prior to visit.        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  Compared to one year ago, the patient feels her mental health is the same.    Reviewed chart for potential of high risk medication in the elderly: yes  Reviewed chart for potential of harmful drug interactions in the elderly:yes    Objective         Vitals:    09/10/19 0810   BP: 123/73   Pulse: 68   Temp: 97.2 °F (36.2 °C)   TempSrc: Oral   SpO2: 98%   Weight: 103 kg (228 lb 1.6 oz)   Height: 162.6 cm (64.02\")       Body mass index is 39.13 kg/m².  Discussed the patient's BMI with her. The BMI is above average; no BMI management plan is appropriate..    Physical Exam    Lab Results   Component Value Date    GLU 97 07/11/2019        Assessment/Plan   Medicare Risks and Personalized Health Plan  CMS Preventative Services Quick Reference  Osteoprorosis Risk    The above risks/problems have been discussed with the patient.  Pertinent information has been shared with the patient in the After Visit Summary.  Follow up plans and orders are seen below in the Assessment/Plan " Section.    Diagnoses and all orders for this visit:    1. Medicare annual wellness visit, subsequent (Primary)    2. Paroxysmal atrial fibrillation (CMS/HCC)    3. Essential hypertension    4. 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     An After Visit Summary and PPPS were given to the patient.

## 2019-09-10 NOTE — PROGRESS NOTES
Subjective   Brittany Villeda is a 84 y.o. female.     Chief Complaint   Patient presents with   • Medicare Wellness-subsequent     pt is fasting    • Hip Pain     left hip pain x 1 wk NKI    • Shoulder Pain     bilateral         History of Present Illness    Hypertension.  Patient continues to check her blood pressure daily.  It runs 120/80 on average.  Its been completely normal for a number of years at this time.  She is no longer taking the Cardizem.  She takes Lasix 20 mg a day only.  No cardiovascular symptoms.    Paroxysmal atrial fibrillation.  Continues on warfarin managed by cardiology.  It is rate controlled without the diltiazem.  She has no symptoms.  She states she does not know when she goes in or out of atrial fibrillation.  She has no trouble with bleeding from the warfarin.    Sciatica.  Right-sided.  Well-controlled with gabapentin 300 mg twice a day.  If she takes it more than twice a day she does not feel well.  She is had no recent right-sided sciatica.    Left-sided hip pain.  1 week.  She was bending over to put on her compression stockings.  Started having pain.  Since then she is having pain with left hip flexion only.  No pain with ambulation.  She went to a chiropractor.  Reportedly x-rays showed may be some arthritis but otherwise reportedly nothing else.  She is had no fall or other injury.    Bilateral shoulder pain.  Followed by orthopedic surgery.  She is going to be seeing them in a week or so.    Ulcerative colitis.  Followed by GI.  No recent exacerbation.  She does continue omeprazole prescribed by GI.          The following portions of the patient's history were reviewed and updated as appropriate: allergies, current medications, past family history, past medical history, past social history, past surgical history and problem list.          Review of Systems   Constitutional: Negative.    Respiratory: Negative.    Cardiovascular: Positive for leg swelling.   Musculoskeletal:  "Positive for back pain.   Neurological: Negative.        Objective   Blood pressure 123/73, pulse 68, temperature 97.2 °F (36.2 °C), temperature source Oral, height 162.6 cm (64.02\"), weight 103 kg (228 lb 1.6 oz), SpO2 98 %.  Physical Exam   Constitutional: She appears well-developed and well-nourished. No distress.   Neck: No thyromegaly present.   Cardiovascular: Normal rate, regular rhythm, normal heart sounds and intact distal pulses.   Appears to be in sinus rhythm today   Pulmonary/Chest: Effort normal and breath sounds normal.   Musculoskeletal: She exhibits edema.   Left hip with pain with internal rotation and flexion of the hip.  Especially with active left hip flexion she has pain over the left hip abductors to palpation.  There is no pain over the greater trochanteric bursa.  She ambulates without pain.   Skin: Skin is warm and dry.   Psychiatric: She has a normal mood and affect. Her behavior is normal. Judgment and thought content normal.   Nursing note and vitals reviewed.      Assessment/Plan   Brittany was seen today for medicare wellness-subsequent, hip pain and shoulder pain.    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        Paroxysmal atrial fibrillation.  Rate controlled.  Currently in sinus rhythm.  Continue warfarin.  Continue with cardiology.    Hypertension.  Well-controlled on furosemide at low-dose.    Ulcerative colitis.  Followed by GI.  Stable.    Strain of the left hip " flexors.  Musculoskeletal strain, recommend observation, Tylenol, heat.  If not improving within the next month or so may need further evaluation.  She can ambulate without pain.    Postmenopausal.  History of osteopenia.  She is at increased fall risk.  Rechecking DEXA scan.    Chronic low back pain with right-sided sciatica, controlled with gabapentin.  300 mg twice a day without side effect.    See me in 6 months.  Sooner as needed.

## 2019-09-12 ENCOUNTER — OFFICE VISIT (OUTPATIENT)
Dept: ORTHOPEDIC SURGERY | Facility: CLINIC | Age: 84
End: 2019-09-12

## 2019-09-12 VITALS — BODY MASS INDEX: 38.99 KG/M2 | TEMPERATURE: 98 F | WEIGHT: 228.4 LBS | HEIGHT: 64 IN

## 2019-09-12 DIAGNOSIS — M19.90 ARTHRITIS: Primary | ICD-10-CM

## 2019-09-12 PROCEDURE — 20610 DRAIN/INJ JOINT/BURSA W/O US: CPT | Performed by: ORTHOPAEDIC SURGERY

## 2019-09-12 RX ADMIN — METHYLPREDNISOLONE ACETATE 80 MG: 80 INJECTION, SUSPENSION INTRA-ARTICULAR; INTRALESIONAL; INTRAMUSCULAR; SOFT TISSUE at 08:13

## 2019-09-12 NOTE — PROGRESS NOTES
Patient: Brittany Villeda  YOB: 1935  Date of Service: 9/12/2019    Chief Complaints:   Chief Complaint   Patient presents with   • Right Shoulder - Follow-up   • Left Shoulder - Follow-up     Bilateral shoulder pain  Subjective:    History of Present Illness: Pt is seen in the office today with complaints of Chief Complaint   Patient presents with   • Right Shoulder - Follow-up   • Left Shoulder - Follow-up   .    Patient is here for bilateral shoulder pain she has arthritis in both is had injections in both wishes to proceed with repeat injection she understands the option of arthroplasty but wishes to hold off on that      Allergies:   Allergies   Allergen Reactions   • Codeine Hallucinations   • Amitriptyline Rash   • Carisoprodol-Aspirin-Codeine Unknown (See Comments)   • Tramadol Unknown (See Comments)     heart races    • Amoxicillin-Pot Clavulanate Rash   • Aspirin Unknown (See Comments)     Patient doesn't know why   • Bactrim [Sulfamethoxazole-Trimethoprim] Rash   • Iodinated Diagnostic Agents Rash   • Latex Rash   • Naproxen Rash   • Nsaids Unknown (See Comments)     unkknown   • Soma Compound With Codeine [Carisoprodol-Aspirin-Codeine] Rash   • Sulfa Antibiotics Rash       Medications:   Home Medications:  Current Outpatient Medications on File Prior to Visit   Medication Sig   • Acetaminophen (PAIN RELIEVER PO) Take 1 tablet by mouth As Needed.   • albuterol sulfate  (90 Base) MCG/ACT inhaler Inhale 2 puffs Every 6 (Six) Hours As Needed for Wheezing.   • calcium carbonate-cholecalciferol 500-400 MG-UNIT tablet tablet Take  by mouth Daily.   • furosemide (LASIX) 20 MG tablet TAKE 2 TABLETS IN THE MORNING  AND TAKE 1 TABLET EVERY EVENING   • gabapentin (NEURONTIN) 300 MG capsule Take 1 capsule by mouth 2 (Two) Times a Day.   • omeprazole (priLOSEC) 20 MG capsule TAKE 1 CAPSULE EVERY DAY   • vitamin B-12 (CYANOCOBALAMIN) 1000 MCG tablet Take 1,000 mcg by mouth Daily.   • warfarin  (COUMADIN) 5 MG tablet TAKE 1 TABLET EVERY DAY (Patient taking differently: TAKE 1 TABLET T, W, F, half tablet M,Th,S,S)     No current facility-administered medications on file prior to visit.      Current Medications:  Scheduled Meds:  Continuous Infusions:  No current facility-administered medications for this visit.   PRN Meds:.    I have reviewed the patient's medical history in detail and updated the computerized patient record.  Review and summarization of old records include:    Past Medical History:   Diagnosis Date   • Anemia    • Arrhythmia    • Arthritis    • Asthma    • Bradycardia    • Chest pain    • Colitis    • COPD (chronic obstructive pulmonary disease) (CMS/HCC)    • Diastolic dysfunction    • Essential hypertension 5/12/2016   • GERD (gastroesophageal reflux disease)    • Heart block    • Hypertension    • Hypertensive heart disease    • Hyperthyroidism    • Kidney stone    • Leukopenia    • Low back pain    • MGUS (monoclonal gammopathy of unknown significance)    • Nephrolithiasis    • Obesity    • Paroxysmal atrial fibrillation (CMS/HCC)    • Peptic ulceration    • Pulmonary hypertension (CMS/HCC)    • Sleep apnea    • Trifascicular block    • Ventricular tachycardia (CMS/HCC)     nonsustained   • Vertigo         Past Surgical History:   Procedure Laterality Date   • BACK SURGERY      lumbar fusion   • CARDIAC ELECTROPHYSIOLOGY PROCEDURE N/A 11/7/2018    Procedure: Pacemaker DC new   [place];  Surgeon: Jesus Granda MD;  Location: [facility] INVASIVE LOCATION;  Service: Cardiology   • CHOLECYSTECTOMY     • COLONOSCOPY  06/16/2014    colitis, cryptitis,  tics, NBIH, TA w/low grade dysplasia   • HEMORRHOIDECTOMY     • HYSTERECTOMY     • KNEE ARTHROPLASTY     • MYOMECTOMY     • SHOULDER SURGERY     • SINUS SURGERY     • TOE NAIL AMPUTATION  03/04/2019   • TONSILLECTOMY          Social History     Occupational History   • Occupation: Caregiver     Employer: RETIRED     Comment: worked for  [employer]   Tobacco Use   • Smoking status: Former Smoker     Packs/day: 1.50     Years: 10.00     Pack years: 15.00     Start date:      Last attempt to quit:      Years since quittin.7   • Smokeless tobacco: Never Used   • Tobacco comment: QUIT SMOKING    Substance and Sexual Activity   • Alcohol use: No     Comment: Daily caffeine use - one cup of coffee   • Drug use: Defer   • Sexual activity: Defer    Social History     Social History Narrative   • Not on file        Family History   Problem Relation Age of Onset   • Diabetes Mother    • Breast cancer Sister    • Kidney cancer Sister    • Heart disease Sister    • Prostate cancer Brother    • Prostate cancer Brother    • Prostate cancer Brother        ROS: 14 point review of systems was performed and was negative except for documented findings in HPI and today's encounter.     Allergies:   Allergies   Allergen Reactions   • Codeine Hallucinations   • Amitriptyline Rash   • Carisoprodol-Aspirin-Codeine Unknown (See Comments)   • Tramadol Unknown (See Comments)     heart races    • Amoxicillin-Pot Clavulanate Rash   • Aspirin Unknown (See Comments)     Patient doesn't know why   • Bactrim [Sulfamethoxazole-Trimethoprim] Rash   • Iodinated Diagnostic Agents Rash   • Latex Rash   • Naproxen Rash   • Nsaids Unknown (See Comments)     unkknown   • Soma Compound With Codeine [Carisoprodol-Aspirin-Codeine] Rash   • Sulfa Antibiotics Rash     Constitutional:  Denies fever, shaking or chills   Eyes:  Denies change in visual acuity   HENT:  Denies nasal congestion or sore throat   Respiratory:  Denies cough or shortness of breath   Cardiovascular:  Denies chest pain or severe LE edema   GI:  Denies abdominal pain, nausea, vomiting, bloody stools or diarrhea   Musculoskeletal:  Numbness, tingling, or loss of motor function only as noted above in history of present illness.  : Denies painful urination or hematuria  Integument:  Denies rash,  lesion or ulceration   Neurologic:  Denies headache or focal weakness  Endocrine:  Denies lymphadenopathy  Psych:  Denies confusion or change in mental status   Hem:  Denies active bleeding      Physical Exam: 84 y.o. female  Wt Readings from Last 3 Encounters:   09/12/19 104 kg (228 lb 6.4 oz)   09/10/19 103 kg (228 lb 1.6 oz)   08/19/19 104 kg (228 lb 9.6 oz)       Body mass index is 39.2 kg/m².  Facility age limit for growth percentiles is 20 years.  Vitals:    09/12/19 0803   Temp: 98 °F (36.7 °C)     Vital signs reviewed.   General Appearance:    Alert, cooperative, in no acute distress                  Eyes: conjunctiva clear  ENT: external ears and nose atraumatic  CV: no peripheral edema  Resp: normal respiratory effort  Skin: no rashes or wounds; normal turgor  Psych: mood and affect appropriate  Lymph: no nodes appreciated  Neuro: gross sensation intact  Vascular:  Palpable peripheral pulse in noted extremity    Ortho exam      Physical exam the left and right shoulder reveals no overlying skin changes no lymphedema lymphadenopathy the patient can actively flex to about 150 passively I get them to 160 abduction is similar external rotation is 40 internal rotation to there buttock.  Rotator cuff strength is 4+ over 5 with isometric strength testing no overlying skin changes.  Patient has reasonable cervical range of motion for their age no radicular symptoms and a normal elbow exam.  There are good distal pulses.           Assessment: Bilateral shoulder arthritis    Plan: Injections  Follow up as indicated.  Ice, elevate, and rest as needed.    Hyacinth Lovell M.D.        Large Joint Arthrocentesis: L glenohumeral  Date/Time: 9/12/2019 8:13 AM  Consent given by: patient  Site marked: site marked  Timeout: Immediately prior to procedure a time out was called to verify the correct patient, procedure, equipment, support staff and site/side marked as required   Supporting Documentation  Indications: pain    Procedure Details  Location: shoulder - L glenohumeral  Preparation: Patient was prepped and draped in the usual sterile fashion  Needle size: 22 G  Approach: posterior  Medications administered: 80 mg methylPREDNISolone acetate 80 MG/ML; 4 mL lidocaine (cardiac)  Patient tolerance: patient tolerated the procedure well with no immediate complications    Large Joint Arthrocentesis: R glenohumeral  Date/Time: 9/12/2019 8:13 AM  Consent given by: patient  Site marked: site marked  Timeout: Immediately prior to procedure a time out was called to verify the correct patient, procedure, equipment, support staff and site/side marked as required   Supporting Documentation  Indications: pain   Procedure Details  Location: shoulder - R glenohumeral  Preparation: Patient was prepped and draped in the usual sterile fashion  Needle size: 22 G  Approach: posterior  Medications administered: 4 mL lidocaine (cardiac); 80 mg methylPREDNISolone acetate 80 MG/ML  Patient tolerance: patient tolerated the procedure well with no immediate complications

## 2019-09-16 ENCOUNTER — TELEPHONE (OUTPATIENT)
Dept: GASTROENTEROLOGY | Facility: CLINIC | Age: 84
End: 2019-09-16

## 2019-09-16 RX ORDER — METHYLPREDNISOLONE ACETATE 80 MG/ML
80 INJECTION, SUSPENSION INTRA-ARTICULAR; INTRALESIONAL; INTRAMUSCULAR; SOFT TISSUE
Status: COMPLETED | OUTPATIENT
Start: 2019-09-12 | End: 2019-09-12

## 2019-09-16 NOTE — TELEPHONE ENCOUNTER
----- Message from Fabiana Kim sent at 9/16/2019 12:02 PM EDT -----  Regarding: symptoms  Contact: [phone]  Pt calling regarding bleeding in stool.

## 2019-09-16 NOTE — TELEPHONE ENCOUNTER
"Call to pt.  Noted scant amount red streaking in BM yesterday - thought may have been from tomatoes.  This am, noting blood on toilet tissue - none in stool, or toilet water.  Rectum \"burns like fire\".      States overall, feels well. H/o hemorrhoid surgery years ago.  States cannot get into tub for sitz bath.      Advise will update Dr English for recommendations.  Verb understanding.  "

## 2019-09-18 ENCOUNTER — HOSPITAL ENCOUNTER (OUTPATIENT)
Dept: BONE DENSITY | Facility: HOSPITAL | Age: 84
Discharge: HOME | End: 2019-09-18
Admitting: FAMILY MEDICINE

## 2019-09-18 DIAGNOSIS — Z78.0 POSTMENOPAUSAL: ICD-10-CM

## 2019-09-18 PROCEDURE — 77080 DXA BONE DENSITY AXIAL: CPT

## 2019-09-18 NOTE — TELEPHONE ENCOUNTER
Called pt and advised per Dr English that she recommends prep h supp every hs and prep h wipes through out the day prn.     Pt verb understanding .

## 2019-09-20 NOTE — PROGRESS NOTES
Spoke in detail with Patient about results, patient expressed understanding and will follow up as agreed.     Any pending Labs and/or Diagnostic procedures required have been ordered for future release.    Tanya GARCIA

## 2019-09-23 ENCOUNTER — ANTICOAGULATION VISIT (OUTPATIENT)
Dept: PHARMACY | Facility: HOSPITAL | Age: 84
End: 2019-09-23

## 2019-09-23 DIAGNOSIS — I48.0 PAROXYSMAL ATRIAL FIBRILLATION (HCC): ICD-10-CM

## 2019-09-23 LAB
INR PPP: 2.4 (ref 0.91–1.09)
PROTHROMBIN TIME: 29.2 SECONDS (ref 10–13.8)

## 2019-09-23 PROCEDURE — 36416 COLLJ CAPILLARY BLOOD SPEC: CPT

## 2019-09-23 PROCEDURE — 85610 PROTHROMBIN TIME: CPT

## 2019-09-23 NOTE — PROGRESS NOTES
Anticoagulation Clinic Progress Note    Anticoagulation Summary  As of 2019    INR goal:   2.0-3.0   TTR:   64.6 % (11.7 mo)   INR used for dosin.4 (2019)   Warfarin maintenance plan:   2.5 mg every Sat; 5 mg all other days   Weekly warfarin total:   32.5 mg   No change documented:   Fiorella Baires   Plan last modified:   Rusty Interiano RP (2019)   Next INR check:   10/21/2019   Priority:   High   Target end date:   Indefinite    Indications    Paroxysmal atrial fibrillation (CMS/[facility]) [I48.0]             Anticoagulation Episode Summary     INR check location:       Preferred lab:       Send INR reminders to:    [facility]    Comments:         Anticoagulation Care Providers     Provider Role Specialty Phone number    Mary Grace Culp MD Referring Cardiology [phone]          Clinic Interview:  Patient Findings     Negatives:   Signs/symptoms of thrombosis, Signs/symptoms of bleeding,   Laboratory test error suspected, Change in health, Change in alcohol use,   Change in activity, Upcoming invasive procedure, Emergency department   visit, Upcoming dental procedure, Missed doses, Extra doses, Change in   medications, Change in diet/appetite, Hospital admission, Bruising, Other   complaints      Clinical Outcomes     Negatives:   Major bleeding event, Thromboembolic event,   Anticoagulation-related hospital admission, Anticoagulation-related ED   visit, Anticoagulation-related fatality        INR History:  Anticoagulation Monitoring 2019   INR 1.9 2.4 2.4   INR Date 2019   INR Goal 2.0-3.0 2.0-3.0 2.0-3.0   Trend Up Same Same   Last Week Total 30 mg 32.5 mg 32.5 mg   Next Week Total 32.5 mg 32.5 mg 32.5 mg   Sun 5 mg 5 mg 5 mg   Mon 5 mg 5 mg 5 mg   Tue 5 mg 5 mg 5 mg   Wed 5 mg 5 mg 5 mg   Thu 5 mg 5 mg 5 mg   Fri 5 mg 5 mg 5 mg   Sat 2.5 mg 2.5 mg 2.5 mg   Visit Report - - -   Some recent data might be hidden        Plan:  1. INR is therapeutic today- see above in Anticoagulation Summary.   Will instruct Brittany Villeda to continue their warfarin regimen- see above in Anticoagulation Summary.  2. Follow up in 4 weeks.  3. Patient declines warfarin refills.  4. Verbal and written information provided. Patient expresses understanding and has no further questions at this time.    Fiorella Baires

## 2019-09-26 ENCOUNTER — TELEPHONE (OUTPATIENT)
Dept: GASTROENTEROLOGY | Facility: CLINIC | Age: 84
End: 2019-09-26

## 2019-09-26 RX ORDER — HYDROCORTISONE ACETATE 25 MG/1
25 SUPPOSITORY RECTAL NIGHTLY
Qty: 14 SUPPOSITORY | Refills: 3 | Status: SHIPPED | OUTPATIENT
Start: 2019-09-26 | End: 2019-10-10

## 2019-09-26 NOTE — TELEPHONE ENCOUNTER
rec check cbc - anusol supp called to pharmacy - if bleeding pwersists would recommend c/s  - if bleeding is severe, rec er eval

## 2019-09-26 NOTE — TELEPHONE ENCOUNTER
"See note of 9/16.      Call to pt  States used preparation h supp and wipes as instructed with above call.  \"Helped for a minute\".    States yesterday and today, noting bright blood in toilet water and tissue.  States bottom a little sore, but not like it was.  INR 9/23 2.4 - therapeutic.    States concerned about bleeding - wonders if should have c/s.    Update/question to DR English.   "

## 2019-09-26 NOTE — TELEPHONE ENCOUNTER
----- Message from Lenore Bob sent at 9/26/2019 11:09 AM EDT -----  Regarding: voicemail  Patient left voicemail stating she has been passing blood, Dr English told her to call the office is she experienced this symptom.

## 2019-09-27 LAB
BASOPHILS # BLD AUTO: 0.01 10*3/MM3 (ref 0–0.2)
BASOPHILS NFR BLD AUTO: 0.3 % (ref 0–1.5)
BUN SERPL-MCNC: 18 MG/DL (ref 8–23)
BUN/CREAT SERPL: 20 (ref 7–25)
CALCIUM SERPL-MCNC: 10.6 MG/DL (ref 8.6–10.5)
CHLORIDE SERPL-SCNC: 103 MMOL/L (ref 98–107)
CO2 SERPL-SCNC: 30.3 MMOL/L (ref 22–29)
CREAT SERPL-MCNC: 0.9 MG/DL (ref 0.57–1)
EOSINOPHIL # BLD AUTO: 0.01 10*3/MM3 (ref 0–0.4)
EOSINOPHIL NFR BLD AUTO: 0.3 % (ref 0.3–6.2)
ERYTHROCYTE [DISTWIDTH] IN BLOOD BY AUTOMATED COUNT: 13.5 % (ref 12.3–15.4)
GLUCOSE SERPL-MCNC: 101 MG/DL (ref 65–99)
HCT VFR BLD AUTO: 38.4 % (ref 34–46.6)
HGB BLD-MCNC: 12.4 G/DL (ref 12–15.9)
IMM GRANULOCYTES # BLD AUTO: 0.01 10*3/MM3 (ref 0–0.05)
IMM GRANULOCYTES NFR BLD AUTO: 0.3 % (ref 0–0.5)
LYMPHOCYTES # BLD AUTO: 1.54 10*3/MM3 (ref 0.7–3.1)
LYMPHOCYTES NFR BLD AUTO: 51.9 % (ref 19.6–45.3)
MCH RBC QN AUTO: 31.4 PG (ref 26.6–33)
MCHC RBC AUTO-ENTMCNC: 32.3 G/DL (ref 31.5–35.7)
MCV RBC AUTO: 97.2 FL (ref 79–97)
MONOCYTES # BLD AUTO: 0.31 10*3/MM3 (ref 0.1–0.9)
MONOCYTES NFR BLD AUTO: 10.4 % (ref 5–12)
NEUTROPHILS # BLD AUTO: 1.09 10*3/MM3 (ref 1.7–7)
NEUTROPHILS NFR BLD AUTO: 36.8 % (ref 42.7–76)
NRBC BLD AUTO-RTO: 0 /100 WBC (ref 0–0.2)
PLATELET # BLD AUTO: 154 10*3/MM3 (ref 140–450)
POTASSIUM SERPL-SCNC: 4.6 MMOL/L (ref 3.5–5.2)
RBC # BLD AUTO: 3.95 10*6/MM3 (ref 3.77–5.28)
SODIUM SERPL-SCNC: 144 MMOL/L (ref 136–145)
WBC # BLD AUTO: 2.97 10*3/MM3 (ref 3.4–10.8)

## 2019-09-27 NOTE — TELEPHONE ENCOUNTER
Called pt and advised per Dr English that she recommends checking cbc.  She also sent anusol supp to her pharmacy.  If bleeding persists would recommends a c/s and if bleeding is severe , recommends er eval.     Pt verb understanding but reports that the supp are $76 and she can not afford this.  Also she reports her stools are orange in color today and she has not seen any bright red blood today.  Also pt is coming in today at 11am for cbc.  Advised pt will send message to Dr English.

## 2019-10-02 LAB — C DIFF TOX A+B STL QL IA: NEGATIVE

## 2019-10-04 ENCOUNTER — OFFICE VISIT (OUTPATIENT)
Dept: CARDIOLOGY | Facility: CLINIC | Age: 84
End: 2019-10-04

## 2019-10-04 VITALS
WEIGHT: 222 LBS | DIASTOLIC BLOOD PRESSURE: 72 MMHG | BODY MASS INDEX: 37.9 KG/M2 | HEIGHT: 64 IN | SYSTOLIC BLOOD PRESSURE: 118 MMHG | HEART RATE: 91 BPM

## 2019-10-04 DIAGNOSIS — I51.89 DIASTOLIC DYSFUNCTION: ICD-10-CM

## 2019-10-04 DIAGNOSIS — I48.19 ATRIAL FIBRILLATION, PERSISTENT (HCC): Primary | ICD-10-CM

## 2019-10-04 DIAGNOSIS — I27.20 PULMONARY HYPERTENSION (HCC): ICD-10-CM

## 2019-10-04 DIAGNOSIS — I47.1 PAROXYSMAL SVT (SUPRAVENTRICULAR TACHYCARDIA) (HCC): ICD-10-CM

## 2019-10-04 LAB
BACTERIA SPEC CULT: NORMAL
BACTERIA SPEC CULT: NORMAL
CAMPYLOBACTER STL CULT: NORMAL
E COLI SXT STL QL IA: NEGATIVE
SALM + SHIG STL CULT: NORMAL

## 2019-10-04 PROCEDURE — 93000 ELECTROCARDIOGRAM COMPLETE: CPT | Performed by: INTERNAL MEDICINE

## 2019-10-04 PROCEDURE — 99213 OFFICE O/P EST LOW 20 MIN: CPT | Performed by: INTERNAL MEDICINE

## 2019-10-04 NOTE — PROGRESS NOTES
Date of Office Visit: 10/04/2019  Encounter Provider: Mary Grace Culp MD  Place of Service: [facility]  Patient Name: Brittany Villeda  :1935    Chief complaint  follow-up of paroxysmal atrial fibrillation, hypertension with hypertensive heart disease conduction disease and chronic warfarin therapy     History of Present Illness  The patient is a pleasant, 84-year-old female with a history of hypertension, obstructive sleep apnea, obesity, immobility, pulmonary hypertension, history of nonsustained ventricular tachycardia, and obstructive sleep apnea.  She has a history of diastolic dysfunction.  In , she had nonsustained ventricular tachycardia.  A stress perfusion study was negative for ischemia.  An echocardiogram revealed normal left ventricular size and function with moderate left ventricular hypertrophy.  In 2012, she had chronic obstructive pulmonary disease exacerbation as well as chest pain that resolved with treatment of her chronic obstructive pulmonary disease.  In 2013, she was seen for persistent dizziness in the setting of new-onset paroxysmal atrial fibrillation.  She was admitted to the hospital and not felt to have had a stroke.  Her symptoms actually resolved with ear manipulation and were felt to be more vestibular in nature.  She also developed paroxysmal atrial fibrillation with early bradycardia which resolved with treatment of her sleep apnea and AV flaco blocker therapy.  She was placed on warfarin.  She also had a heme-positive stool with anemia and was seen by the gastrointestinal doctors and EGD and colonoscopy were performed. The EGD revealed mild erythema but otherwise stable.  She initiated therapy with a BiPAP.  In 2017 presented to the emergency room with chest pain.  She ruled out for myocardial infarction.  A stress perfusion study that was negative for ischemia with normal systolic function.  A Ziopatch revealed  sinus rhythm with episodes of supra-ventricular tachycardia that was symptomatic.  In September 2018 EKG showed pauses in the setting of bifascicular block.  A 24-hour Holter revealed 65 pauses 3 seconds longest induration.  Sinus rhythm was present throughout most of the study.  With complaints of palpitations PACs were noted.  Current 22.2% of the monitor time there is also 14 episodes of atrial tachycardia lasting 4 beats.  There were also episodes of 2-1 AV block and questionable third degree.  An echocardiogram revealed normal systolic function grade 1 diastolic dysfunction, borderline right ventricular enlargement, mild to moderate tricuspid valve regurgitation with an RV systolic pressure 48 mmHg.  The ascending aorta was mildly dilated at 3.8 cm.  I recommended a pacemaker placement but she deferred in which she discuss this further with her daughter and is here today regarding this.  By September 2018 she was noted to have more symptomatic bradycardia and after much discussion she underwent pacemaker placement in November 2018.     Since the last visit she has not been exercising.  She denies any chest pain palpitations syncope near syncope.  Her chronic dyspnea on exertion is unchanged.  Last check in July 2019 showed normal function with persistent atrial fibrillation.  She has chronic dyspnea on exertion that is unchanged.  Her edema has improved with leg elevation and support stockings.  She had some rectal bleeding in September and saw Dr. Santana for it.  Her hemoglobin was 12.4.  She denies any chest pain syncope near syncope or palpitations.    Past Medical History:   Diagnosis Date   • Anemia    • Arrhythmia    • Arthritis    • Asthma    • Bradycardia    • Chest pain    • Colitis    • COPD (chronic obstructive pulmonary disease) (CMS/[facility])    • Diastolic dysfunction    • Essential hypertension 5/12/2016   • GERD (gastroesophageal reflux disease)    • Heart block    • Hypertension    • Hypertensive  heart disease    • Hyperthyroidism    • Kidney stone    • Leukopenia    • Low back pain    • MGUS (monoclonal gammopathy of unknown significance)    • Nephrolithiasis    • Obesity    • Paroxysmal atrial fibrillation (CMS/HCC)    • Peptic ulceration    • PSVT (paroxysmal supraventricular tachycardia) (CMS/HCC)    • Pulmonary hypertension (CMS/HCC)    • Sleep apnea    • Trifascicular block    • 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)    • Ventricular tachycardia (CMS/HCC)     nonsustained   • Vertigo      Past Surgical History:   Procedure Laterality Date   • BACK SURGERY      lumbar fusion   • CARDIAC ELECTROPHYSIOLOGY PROCEDURE N/A 11/7/2018    Procedure: Pacemaker DC new   [place];  Surgeon: Jesus Granda MD;  Location: [facility] INVASIVE LOCATION;  Service: Cardiology   • CHOLECYSTECTOMY     • COLONOSCOPY  06/16/2014    colitis, cryptitis,  tics, NBIH, TA w/low grade dysplasia   • HEMORRHOIDECTOMY     • HYSTERECTOMY     • KNEE ARTHROPLASTY     • MYOMECTOMY     • SHOULDER SURGERY     • SINUS SURGERY     • TOE NAIL AMPUTATION  03/04/2019   • TONSILLECTOMY       Outpatient Medications Prior to Visit   Medication Sig Dispense Refill   • Acetaminophen (PAIN RELIEVER PO) Take 1 tablet by mouth As Needed.     • albuterol sulfate  (90 Base) MCG/ACT inhaler Inhale 2 puffs Every 6 (Six) Hours As Needed for Wheezing. 1 inhaler 0   • calcium carbonate-cholecalciferol 500-400 MG-UNIT tablet tablet Take  by mouth Daily.     • furosemide (LASIX) 20 MG tablet TAKE 2 TABLETS IN THE MORNING  AND TAKE 1 TABLET EVERY EVENING 270 tablet 1   • gabapentin (NEURONTIN) 300 MG capsule Take 1 capsule by mouth 2 (Two) Times a Day. 180 capsule 1   • hydrocortisone (ANUSOL-HC) 25 MG suppository Insert 1 suppository into the rectum Every Night for 14 days. 14 suppository 3   • omeprazole (priLOSEC) 20 MG capsule TAKE 1 CAPSULE EVERY DAY 90 capsule 0   • vitamin B-12 (CYANOCOBALAMIN) 1000 MCG tablet Take 1,000  mcg by mouth Daily.     • warfarin (COUMADIN) 5 MG tablet TAKE 1 TABLET EVERY DAY (Patient taking differently: TAKE 1 TABLET T, W, F, half tablet M,Th,S,S) 90 tablet 1     No facility-administered medications prior to visit.        Allergies as of 10/04/2019 - Reviewed 10/04/2019   Allergen Reaction Noted   • Codeine Hallucinations 2017   • Amitriptyline Rash 2016   • Carisoprodol-aspirin-codeine Unknown (See Comments) 2016   • Tramadol Unknown (See Comments) 2019   • Amoxicillin-pot clavulanate Rash 2016   • Aspirin Unknown (See Comments) 2016   • Bactrim [sulfamethoxazole-trimethoprim] Rash 2016   • Iodinated diagnostic agents Rash 2016   • Latex Rash 2016   • Naproxen Rash 2016   • Nsaids Unknown (See Comments) 2016   • Soma compound with codeine [carisoprodol-aspirin-codeine] Rash 2016   • Sulfa antibiotics Rash 2016     Social History     Socioeconomic History   • Marital status:      Spouse name: Not on file   • Number of children: 10   • Years of education: High School   • Highest education level: Not on file   Occupational History   • Occupation: Caregiver     Employer: RETIRED     Comment: worked for [employer]   Tobacco Use   • Smoking status: Former Smoker     Packs/day: 1.50     Years: 10.00     Pack years: 15.00     Start date:      Last attempt to quit:      Years since quittin.7   • Smokeless tobacco: Never Used   • Tobacco comment: QUIT SMOKING    Substance and Sexual Activity   • Alcohol use: No     Comment: Daily caffeine use - one cup of coffee   • Drug use: Defer   • Sexual activity: Defer     Family History   Problem Relation Age of Onset   • Diabetes Mother    • Breast cancer Sister    • Kidney cancer Sister    • Heart disease Sister    • Prostate cancer Brother    • Prostate cancer Brother    • Prostate cancer Brother      Review of Systems   Constitution: Negative for fever,  "malaise/fatigue, weight gain and weight loss (Initially lost 22 pounds since to lie).   HENT: Positive for hearing loss. Negative for ear pain, nosebleeds and sore throat.    Eyes: Negative for double vision, pain, vision loss in left eye and vision loss in right eye.   Cardiovascular:        See history of present illness.   Respiratory: Negative for cough, shortness of breath, sleep disturbances due to breathing, snoring and wheezing.    Endocrine: Negative for cold intolerance, heat intolerance and polyuria.   Skin: Negative for itching, poor wound healing and rash.   Musculoskeletal: Positive for joint pain and myalgias. Negative for joint swelling.   Gastrointestinal: Positive for hematochezia. Negative for abdominal pain, diarrhea, nausea and vomiting.   Genitourinary: Negative for hematuria and hesitancy.   Neurological: Negative for numbness, paresthesias and seizures.   Psychiatric/Behavioral: Negative for depression. The patient is not nervous/anxious.         Objective:     Vitals:    10/04/19 1044   BP: 118/72   Pulse: 91   Weight: 101 kg (222 lb)   Height: 162.6 cm (64\")     Body mass index is 38.11 kg/m².    Physical Exam   Constitutional: She is oriented to person, place, and time. She appears well-developed and well-nourished.   Obese   HENT:   Head: Normocephalic.   Nose: Nose normal.   Mouth/Throat: Oropharynx is clear and moist.   Eyes: Conjunctivae and EOM are normal. Pupils are equal, round, and reactive to light. Right eye exhibits no discharge. No scleral icterus.   Neck: Normal range of motion. Neck supple. No JVD present. No thyromegaly present.   Cardiovascular: Normal rate, normal heart sounds and intact distal pulses. An irregularly irregular rhythm present. Exam reveals no gallop and no friction rub.   No murmur heard.  Pulses:       Carotid pulses are 2+ on the right side, and 2+ on the left side.       Radial pulses are 2+ on the right side, and 2+ on the left side.        Femoral " pulses are 2+ on the right side, and 2+ on the left side.       Popliteal pulses are 2+ on the right side, and 2+ on the left side.        Dorsalis pedis pulses are 2+ on the right side, and 2+ on the left side.        Posterior tibial pulses are 2+ on the right side, and 2+ on the left side.   Pulmonary/Chest: Effort normal and breath sounds normal. No respiratory distress. She has no wheezes. She has no rales.   Abdominal: Soft. Bowel sounds are normal. She exhibits no distension. There is no hepatosplenomegaly. There is no tenderness. There is no rebound.   Musculoskeletal: Normal range of motion. She exhibits no edema or tenderness.   Neurological: She is alert and oriented to person, place, and time.   Skin: Skin is warm and dry. No rash noted. No erythema.   Psychiatric: She has a normal mood and affect. Her behavior is normal. Judgment and thought content normal.   Vitals reviewed.    Lab Review:     ECG 12 Lead  Date/Time: 10/4/2019 10:56 AM  Performed by: Mary Grace Culp MD  Authorized by: Mary Grace Culp MD   Comparison: compared with previous ECG   Comparison to previous ECG: Intermittent negative ventricular conduction is present  Rhythm: atrial fibrillation  QRS axis: left  Pacing: ventricular paced rhythm  Clinical impression: abnormal EKG          Assessment:       Diagnosis Plan   1. Atrial fibrillation, persistent  ECG 12 Lead   2. Paroxysmal SVT (supraventricular tachycardia) (CMS/HCC)     3. Pulmonary hypertension (CMS/HCC)     4. Diastolic dysfunction       Plan:           1.  Persistent atrial fibrillation with history of paroxysmal supraventricular tachycardia.  .  No significant tachyarrhythmia noted on last pacemaker check.  She has one coming up in the near future.  She is still on Coumadin at this point and working on her rectal bleeding with Dr. Francis.  She is well aware that if this worsens she must go to the emergency room promptly.  2.  Status post permanent pacemaker placement.  Device  check next week  3.  Hypertension, controlled  4.  History of diastolic dysfunction.   5.  Dilated a sending aorta measuring 4.0 cm.  (Not read by radiology as an aneurysm)  6.  Pulmonary hypertension.  RV pressures further elevated in July 2019 to 53 mmHg.  Likely due to untreated sleep apnea.  7   Hx of chest pain.  Negative PET scan in December 2017.  No recurrence  8.  Untreated HUGO, intolerant of BiPAP but likely has addressed this with her 22 pound weight loss.  9.  Rectal bleeding with hemorroids. Followed by Dr English and may need colonoscopy. Still some bleeding with rectal suppossitory x 12 days.  I have asked her to contact Dr. Santana with an update next week and if it worsens over the weekend to go to the emergency room.    Atrial Fibrillation and Atrial Flutter  Assessment  • The patient's CHADS2-VASc score is 4  • A [id number] score of 2 or more is considered a high risk for a thromboembolic event       Your medication list           Accurate as of 10/4/19 11:59 PM. If you have any questions, ask your nurse or doctor.               CHANGE how you take these medications      Instructions Last Dose Given Next Dose Due   warfarin 5 MG tablet  Commonly known as:  COUMADIN  What changed:    · how much to take  · how to take this  · when to take this      TAKE 1 TABLET EVERY DAY          CONTINUE taking these medications      Instructions Last Dose Given Next Dose Due   albuterol sulfate  (90 Base) MCG/ACT inhaler  Commonly known as:  PROVENTIL HFA;VENTOLIN HFA;PROAIR HFA      Inhale 2 puffs Every 6 (Six) Hours As Needed for Wheezing.       calcium carbonate-cholecalciferol 500-400 MG-UNIT tablet tablet      Take  by mouth Daily.       furosemide 20 MG tablet  Commonly known as:  LASIX      TAKE 2 TABLETS IN THE MORNING  AND TAKE 1 TABLET EVERY EVENING       gabapentin 300 MG capsule  Commonly known as:  NEURONTIN      Take 1 capsule by mouth 2 (Two) Times a Day.       hydrocortisone 25 MG  suppository  Commonly known as:  ANUSOL-HC      Insert 1 suppository into the rectum Every Night for 14 days.       omeprazole 20 MG capsule  Commonly known as:  priLOSEC      TAKE 1 CAPSULE EVERY DAY       PAIN RELIEVER PO      Take 1 tablet by mouth As Needed.       vitamin B-12 1000 MCG tablet  Commonly known as:  CYANOCOBALAMIN      Take 1,000 mcg by mouth Daily.              Patient is no longer taking -.  I corrected the med list to reflect this.  I did not stop these medications.    Dictated utilizing Dragon dictation

## 2019-10-06 PROBLEM — I48.19 ATRIAL FIBRILLATION, PERSISTENT (HCC): Status: ACTIVE | Noted: 2019-10-06

## 2019-10-06 PROBLEM — I48.19 ATRIAL FIBRILLATION, PERSISTENT: Status: ACTIVE | Noted: 2019-10-06

## 2019-10-06 LAB
O+P SPEC MICRO: NORMAL
O+P STL CONC: NORMAL

## 2019-10-07 ENCOUNTER — TELEPHONE (OUTPATIENT)
Dept: GASTROENTEROLOGY | Facility: CLINIC | Age: 84
End: 2019-10-07

## 2019-10-09 NOTE — TELEPHONE ENCOUNTER
Called pt and advised per Dr English that her hgb remains stable and her bmp was normal.     Pt verb understanding.  Pt reports that she is still bleeding.  She states that the bleeding happens mostly in the am.  She states in the am she sees blood in her stools, when she wipes and in the toilet water.  In the afternoon when she has a bm she sees the blood only in the stool.  Advised will send message to Dr English.  Pt verb understanding.

## 2019-10-10 NOTE — TELEPHONE ENCOUNTER
Chads vas score is 4.  With a low thromboembolic  procedural risk and intermediate to high procedural bleeding risk.  Therefore it is reasonable to hold Coumadin without a bridge. Micaela, is it okay to hold Coumadin for 3 to 4 days prior to the colonoscopy?

## 2019-10-10 NOTE — TELEPHONE ENCOUNTER
Due to the continued bleeding, perhaps we need to discuss repeating her colonoscopy.  Please see if she thinks she could tolerate this prep and who prescribes her Coumadin so that we can get clearance to hold it for the procedure.  If she is interested in her cardiologist is agreeable, could consider evaluating the bleeding with colonoscopy since it does not seem to be subsiding

## 2019-10-10 NOTE — TELEPHONE ENCOUNTER
Called pt and advised per Dr English that due to the continues bleeding , perhaps we need to discuss repeating c/s.     Pt verb understanding and states she can tolerated the prep.  Pt also reports Dr Culp prescribed her coumadin.  Advised Pt we will contact Dr Culp to see if she can hold her coumadin for a colonoscopy.  Pt verb understanding.     Message sent to Dr Culp regarding the above.      Also update sent to Dr English.

## 2019-10-15 ENCOUNTER — TELEPHONE (OUTPATIENT)
Dept: GASTROENTEROLOGY | Facility: CLINIC | Age: 84
End: 2019-10-15

## 2019-10-15 ENCOUNTER — PREP FOR SURGERY (OUTPATIENT)
Dept: OTHER | Facility: HOSPITAL | Age: 84
End: 2019-10-15

## 2019-10-15 DIAGNOSIS — K62.5 RECTAL BLEEDING: Primary | ICD-10-CM

## 2019-10-15 RX ORDER — MESALAMINE 0.38 G/1
375 CAPSULE, EXTENDED RELEASE ORAL DAILY
Qty: 120 CAPSULE | Refills: 1 | Status: ON HOLD | OUTPATIENT
Start: 2019-10-15 | End: 2019-10-28 | Stop reason: SDUPTHER

## 2019-10-15 NOTE — TELEPHONE ENCOUNTER
Call to pt.  Advise per Dr English that will send apriso to pharmacy to see if this is covered.  Should proceed with c/s as previously discussed.  Dr Culp has o k'd holding coumadin x 4 days prior - can be scheduled 10/28 if ok with pt.  Last dose coumadin 10/23.    Verb understanding and agreeable to 10/28.  Advise that Scheduling will contact to arrange.

## 2019-10-15 NOTE — TELEPHONE ENCOUNTER
Called pt and pt reports she is still having bleeding in the am.  She reports yesterday that her first bm was bright red and then later in the day it was a maroon color.  She reports that she is using the prep h supp, but is still having problems with bleeding.  Pt is currently not on any medications.  Pt reports feeling well.  Advised pt will send message to Dr English and in the meantime if her bleeding increases to go to ER.  Pt verb understanding.

## 2019-10-15 NOTE — TELEPHONE ENCOUNTER
----- Message from Matty Wagoner sent at 10/15/2019  9:12 AM EDT -----  Regarding: bleeding  Contact: [phone]  Pt says she is still bleeding in the mornings and wants to know what she needs to do.

## 2019-10-15 NOTE — TELEPHONE ENCOUNTER
Will send apriso to pharmacy to see if this is covered (instead of lialda or colazol) and I think we should proceed with c/s as previously discussed - Dr Culp has ok'd holding her coumadin x 4 days prior (INR day of c/s) - can be scheduled at [facility] on 10/28 if ok with pt (last dose coumadin 10/23)

## 2019-10-16 RX ORDER — FUROSEMIDE 20 MG/1
TABLET ORAL
Qty: 270 TABLET | Refills: 1 | Status: SHIPPED | OUTPATIENT
Start: 2019-10-16 | End: 2019-10-25

## 2019-10-17 ENCOUNTER — CLINICAL SUPPORT NO REQUIREMENTS (OUTPATIENT)
Dept: CARDIOLOGY | Facility: CLINIC | Age: 84
End: 2019-10-17

## 2019-10-17 DIAGNOSIS — I44.2 COMPLETE HEART BLOCK (HCC): Primary | ICD-10-CM

## 2019-10-17 PROCEDURE — 93294 REM INTERROG EVL PM/LDLS PM: CPT | Performed by: INTERNAL MEDICINE

## 2019-10-17 PROCEDURE — 93296 REM INTERROG EVL PM/IDS: CPT | Performed by: INTERNAL MEDICINE

## 2019-10-21 ENCOUNTER — ANTICOAGULATION VISIT (OUTPATIENT)
Dept: PHARMACY | Facility: HOSPITAL | Age: 84
End: 2019-10-21

## 2019-10-21 LAB
INR PPP: 2.7 (ref 0.91–1.09)
PROTHROMBIN TIME: 32.9 SECONDS (ref 10–13.8)

## 2019-10-21 PROCEDURE — 85610 PROTHROMBIN TIME: CPT

## 2019-10-21 PROCEDURE — 36416 COLLJ CAPILLARY BLOOD SPEC: CPT

## 2019-10-21 NOTE — PROGRESS NOTES
Anticoagulation Clinic Progress Note    Anticoagulation Summary  As of 10/21/2019    INR goal:   2.0-3.0   TTR:   67.2 % (1 y)   INR used for dosin.7 (10/21/2019)   Warfarin maintenance plan:   2.5 mg every Sat; 5 mg all other days   Weekly warfarin total:   32.5 mg   Plan last modified:   Rusty Interiano [facility] (2019)   Next INR check:   2019   Priority:   High   Target end date:   Indefinite         Anticoagulation Episode Summary     INR check location:       Preferred lab:       Send INR reminders to:   [facility] [employer] [place]    Comments:         Anticoagulation Care Providers     Provider Role Specialty Phone number    Mary Grace Culp MD Referring Cardiology [phone]          Clinic Interview:      INR History:  Anticoagulation Monitoring 2019 2019 10/21/2019   INR 2.4 2.4 2.7   INR Date 2019 2019 10/21/2019   INR Goal 2.0-3.0 2.0-3.0 2.0-3.0   Trend Same Same Same   Last Week Total 32.5 mg 32.5 mg 32.5 mg   Next Week Total 32.5 mg 32.5 mg 15 mg   Sun 5 mg 5 mg Hold (10/27); Otherwise 5 mg   Mon 5 mg 5 mg 5 mg   Tue 5 mg 5 mg 5 mg   Wed 5 mg 5 mg 5 mg   Thu 5 mg 5 mg Hold (10/24); Otherwise 5 mg   Fri 5 mg 5 mg Hold (10/25); Otherwise 5 mg   Sat 2.5 mg 2.5 mg Hold (10/26); Otherwise 2.5 mg   Visit Report - - -   Some recent data might be hidden       Plan:  1. INR is Therapeutic today- see above in Anticoagulation Summary.  Will instruct Brittany Villeda to Continue their warfarin regimen and will hold 4 days prior to colonoscopy on 10/28 as per Dr. Culp & Dr. English notes- see above in Anticoagulation Summary.  2. Follow up in 1 week after procedure/restart of warfarin  3. Patient declines warfarin refills.  4. Verbal and written information provided. Patient expresses understanding and has no further questions at this time.    Yasmeen Pichardo [facility]

## 2019-10-24 ENCOUNTER — TELEPHONE (OUTPATIENT)
Dept: GASTROENTEROLOGY | Facility: CLINIC | Age: 84
End: 2019-10-24

## 2019-10-24 NOTE — TELEPHONE ENCOUNTER
Received fax request for clarification on apriso 0.375mg .  Script states to take one per day however quantity states 120.   Called pt's [facility] pharmacy and spoke with pharmacist Pablo and advised that the instructions should be take 4 tabs po daily.  He verb understanding.

## 2019-10-25 ENCOUNTER — TELEPHONE (OUTPATIENT)
Dept: GASTROENTEROLOGY | Facility: CLINIC | Age: 84
End: 2019-10-25

## 2019-10-25 RX ORDER — OMEPRAZOLE 20 MG/1
20 CAPSULE, DELAYED RELEASE ORAL DAILY
COMMUNITY
End: 2019-10-30 | Stop reason: SDUPTHER

## 2019-10-25 RX ORDER — WARFARIN SODIUM 5 MG/1
5 TABLET ORAL
COMMUNITY
End: 2019-11-11 | Stop reason: SDUPTHER

## 2019-10-25 RX ORDER — FUROSEMIDE 20 MG/1
20 TABLET ORAL 2 TIMES DAILY
COMMUNITY
End: 2020-03-12

## 2019-10-28 ENCOUNTER — HOSPITAL ENCOUNTER (OUTPATIENT)
Facility: HOSPITAL | Age: 84
Setting detail: HOSPITAL OUTPATIENT SURGERY
Discharge: HOME | End: 2019-10-28
Attending: INTERNAL MEDICINE | Admitting: INTERNAL MEDICINE

## 2019-10-28 ENCOUNTER — ANESTHESIA (OUTPATIENT)
Dept: GASTROENTEROLOGY | Facility: HOSPITAL | Age: 84
End: 2019-10-28

## 2019-10-28 ENCOUNTER — ANTICOAGULATION VISIT (OUTPATIENT)
Dept: PHARMACY | Facility: HOSPITAL | Age: 84
End: 2019-10-28

## 2019-10-28 ENCOUNTER — ANESTHESIA EVENT (OUTPATIENT)
Dept: GASTROENTEROLOGY | Facility: HOSPITAL | Age: 84
End: 2019-10-28

## 2019-10-28 VITALS
HEART RATE: 71 BPM | SYSTOLIC BLOOD PRESSURE: 118 MMHG | WEIGHT: 212 LBS | RESPIRATION RATE: 16 BRPM | OXYGEN SATURATION: 98 % | DIASTOLIC BLOOD PRESSURE: 79 MMHG | BODY MASS INDEX: 36.39 KG/M2 | TEMPERATURE: 98.3 F

## 2019-10-28 DIAGNOSIS — I48.19 ATRIAL FIBRILLATION, PERSISTENT (HCC): ICD-10-CM

## 2019-10-28 DIAGNOSIS — K62.5 RECTAL BLEEDING: ICD-10-CM

## 2019-10-28 LAB
INR PPP: 1.32 (ref 0.9–1.1)
PROTHROMBIN TIME: 16.1 SECONDS (ref 11.7–14.2)

## 2019-10-28 PROCEDURE — 88305 TISSUE EXAM BY PATHOLOGIST: CPT | Performed by: INTERNAL MEDICINE

## 2019-10-28 PROCEDURE — 25010000002 PROPOFOL 10 MG/ML EMULSION: Performed by: NURSE ANESTHETIST, CERTIFIED REGISTERED

## 2019-10-28 PROCEDURE — 45380 COLONOSCOPY AND BIOPSY: CPT | Performed by: INTERNAL MEDICINE

## 2019-10-28 PROCEDURE — S0260 H&P FOR SURGERY: HCPCS | Performed by: INTERNAL MEDICINE

## 2019-10-28 PROCEDURE — 45385 COLONOSCOPY W/LESION REMOVAL: CPT | Performed by: INTERNAL MEDICINE

## 2019-10-28 PROCEDURE — 85610 PROTHROMBIN TIME: CPT | Performed by: INTERNAL MEDICINE

## 2019-10-28 DEVICE — DEV CLIP ENDO RESOLUTION360 CONTRL ROT 235CM: Type: IMPLANTABLE DEVICE | Site: TRANSVERSE COLON | Status: FUNCTIONAL

## 2019-10-28 RX ORDER — SODIUM CHLORIDE 0.9 % (FLUSH) 0.9 %
10 SYRINGE (ML) INJECTION AS NEEDED
Status: DISCONTINUED | OUTPATIENT
Start: 2019-10-28 | End: 2019-10-28 | Stop reason: HOSPADM

## 2019-10-28 RX ORDER — PROPOFOL 10 MG/ML
VIAL (ML) INTRAVENOUS CONTINUOUS PRN
Status: DISCONTINUED | OUTPATIENT
Start: 2019-10-28 | End: 2019-10-28 | Stop reason: SURG

## 2019-10-28 RX ORDER — MESALAMINE 0.38 G/1
375 CAPSULE, EXTENDED RELEASE ORAL DAILY
Qty: 360 CAPSULE | Refills: 1 | Status: SHIPPED | OUTPATIENT
Start: 2019-10-28 | End: 2019-11-22 | Stop reason: SDUPTHER

## 2019-10-28 RX ORDER — SODIUM CHLORIDE 0.9 % (FLUSH) 0.9 %
3 SYRINGE (ML) INJECTION EVERY 12 HOURS SCHEDULED
Status: DISCONTINUED | OUTPATIENT
Start: 2019-10-28 | End: 2019-10-28 | Stop reason: HOSPADM

## 2019-10-28 RX ORDER — LIDOCAINE HYDROCHLORIDE 20 MG/ML
INJECTION, SOLUTION INFILTRATION; PERINEURAL AS NEEDED
Status: DISCONTINUED | OUTPATIENT
Start: 2019-10-28 | End: 2019-10-28 | Stop reason: SURG

## 2019-10-28 RX ORDER — PROPOFOL 10 MG/ML
VIAL (ML) INTRAVENOUS AS NEEDED
Status: DISCONTINUED | OUTPATIENT
Start: 2019-10-28 | End: 2019-10-28 | Stop reason: SURG

## 2019-10-28 RX ORDER — SODIUM CHLORIDE, SODIUM LACTATE, POTASSIUM CHLORIDE, CALCIUM CHLORIDE 600; 310; 30; 20 MG/100ML; MG/100ML; MG/100ML; MG/100ML
30 INJECTION, SOLUTION INTRAVENOUS CONTINUOUS PRN
Status: DISCONTINUED | OUTPATIENT
Start: 2019-10-28 | End: 2019-10-28 | Stop reason: HOSPADM

## 2019-10-28 RX ADMIN — PROPOFOL 100 MCG/KG/MIN: 10 INJECTION, EMULSION INTRAVENOUS at 13:54

## 2019-10-28 RX ADMIN — PROPOFOL 100 MG: 10 INJECTION, EMULSION INTRAVENOUS at 13:54

## 2019-10-28 RX ADMIN — LIDOCAINE HYDROCHLORIDE 60 MG: 20 INJECTION, SOLUTION INFILTRATION; PERINEURAL at 13:54

## 2019-10-28 RX ADMIN — SODIUM CHLORIDE, POTASSIUM CHLORIDE, SODIUM LACTATE AND CALCIUM CHLORIDE 30 ML/HR: 600; 310; 30; 20 INJECTION, SOLUTION INTRAVENOUS at 13:49

## 2019-10-28 NOTE — ANESTHESIA PREPROCEDURE EVALUATION
Anesthesia Evaluation     Patient summary reviewed and Nursing notes reviewed                Airway   Mallampati: II  TM distance: >3 FB  Neck ROM: full  No difficulty expected  Dental - normal exam   (+) upper dentures    Pulmonary - normal exam   (+) COPD, asthma, sleep apnea,   Cardiovascular     Rhythm: regular  Rate: normal    (+) pacemaker pacemaker, hypertension, dysrhythmias Bradycardia, Atrial Fib, Tachycardia, murmur,     ROS comment: Hx afib/PSVT/pacer/pulmonary HTN  PE comment: Systolic murmur LSB    Neuro/Psych  (+) dizziness/light headedness, numbness,       ROS Comment: Vertigo  GI/Hepatic/Renal/Endo    (+) obesity, morbid obesity, GERD, PUD, GI bleeding, renal disease, hyperthyroidism    Musculoskeletal     (+) back pain, chronic pain,   Abdominal   (+) obese,    Substance History      OB/GYN          Other   (+) arthritis                   Anesthesia Plan    ASA 3     MAC     intravenous induction   Anesthetic plan, all risks, benefits, and alternatives have been provided, discussed and informed consent has been obtained with: patient.

## 2019-10-28 NOTE — ANESTHESIA POSTPROCEDURE EVALUATION
Patient: Brittany Villeda    Procedure Summary     Date:  10/28/19 Room / Location:   [facility]    Anesthesia Start:  1351 Anesthesia Stop:  1428    Procedure:  COLONOSCOPY WITH COLD AND HOT POLYPECTOMIES (N/A ) Diagnosis:       Rectal bleeding      (Rectal bleeding [K62.5])    Surgeon:  Micaela English MD Provider:  Estiven Scales MD    Anesthesia Type:  MAC ASA Status:  3          Anesthesia Type: MAC  Last vitals  BP   (!) 83/59 (10/28/19 1429)   Temp   36.8 °C (98.3 °F) (10/28/19 1304)   Pulse   71 (10/28/19 1429)   Resp   14 (10/28/19 1429)     SpO2   98 % (10/28/19 1429)     Post Anesthesia Care and Evaluation    Patient location during evaluation: PHASE II  Patient participation: complete - patient participated  Level of consciousness: awake and alert  Pain management: adequate  Airway patency: patent  Anesthetic complications: No anesthetic complications  PONV Status: none  Cardiovascular status: acceptable  Respiratory status: acceptable  Hydration status: acceptable

## 2019-10-28 NOTE — PROGRESS NOTES
Anticoagulation Clinic Progress Note    Anticoagulation Summary  As of 10/28/2019    INR goal:   2.0-3.0   TTR:   66.9 % (1.1 y)   INR used for dosin.32! (10/28/2019)   Warfarin maintenance plan:   2.5 mg every Sat; 5 mg all other days   Weekly warfarin total:   32.5 mg   No change documented:   Rusty Interiano RPH   Plan last modified:   Rusty Interiano RPH (2019)   Next INR check:   2019   Priority:   High   Target end date:   Indefinite    Indications    Atrial fibrillation  persistent [I48.19]             Anticoagulation Episode Summary     INR check location:       Preferred lab:       Send INR reminders to:    [facility]    Comments:         Anticoagulation Care Providers     Provider Role Specialty Phone number    Mary Grace Culp MD Referring Cardiology [phone]            Clinic Interview:  Patient Findings     Positives:   Change in medications    Negatives:   Signs/symptoms of thrombosis, Signs/symptoms of bleeding,   Laboratory test error suspected, Change in health, Change in alcohol use,   Change in activity, Upcoming invasive procedure, Emergency department   visit, Upcoming dental procedure, Missed doses, Extra doses, Change in   diet/appetite, Hospital admission, Bruising, Other complaints    Comments:   Starting Apriso (mesalamine) tomorrow      Clinical Outcomes     Negatives:   Major bleeding event, Thromboembolic event,   Anticoagulation-related hospital admission, Anticoagulation-related ED   visit, Anticoagulation-related fatality    Comments:   Starting Apriso (mesalamine) tomorrow        INR History:  Anticoagulation Monitoring 2019 10/21/2019 10/28/2019   INR 2.4 2.7 1.32   INR Date 2019 10/21/2019 10/28/2019   INR Goal 2.0-3.0 2.0-3.0 2.0-3.0   Trend Same Same Same   Last Week Total 32.5 mg 32.5 mg 15 mg   Next Week Total 32.5 mg 15 mg 32.5 mg   Sun 5 mg Hold (10/27); Otherwise 5 mg 5 mg   Mon 5 mg 5 mg 5 mg   Tue 5 mg 5 mg 5 mg   Wed 5 mg 5 mg 5 mg    Thu 5 mg Hold (10/24); Otherwise 5 mg 5 mg   Fri 5 mg Hold (10/25); Otherwise 5 mg 5 mg   Sat 2.5 mg Hold (10/26); Otherwise 2.5 mg 2.5 mg   Visit Report - - -   Some recent data might be hidden       Plan:  1. INR is Subtherapeutic today- see above in Anticoagulation Summary.   Will instruct Brittany Villeda to Resume their warfarin regimen; defer boost due to GI bleeding prior to colonscopy - see above in Anticoagulation Summary.  2. Follow up in 1 week as previously scheduled  3. They have been instructed to call if any changes in medications, doses, concerns, etc. Patient expresses understanding and has no further questions at this time.    Rusty Interiano [facility]

## 2019-10-29 LAB
CYTO UR: NORMAL
LAB AP CASE REPORT: NORMAL
PATH REPORT.FINAL DX SPEC: NORMAL
PATH REPORT.GROSS SPEC: NORMAL

## 2019-10-30 RX ORDER — OMEPRAZOLE 20 MG/1
CAPSULE, DELAYED RELEASE ORAL
Qty: 90 CAPSULE | Refills: 0 | Status: SHIPPED | OUTPATIENT
Start: 2019-10-30 | End: 2020-01-10

## 2019-11-05 ENCOUNTER — ANTICOAGULATION VISIT (OUTPATIENT)
Dept: PHARMACY | Facility: HOSPITAL | Age: 84
End: 2019-11-05

## 2019-11-05 DIAGNOSIS — I48.19 ATRIAL FIBRILLATION, PERSISTENT (HCC): ICD-10-CM

## 2019-11-05 LAB
INR PPP: 1.8 (ref 0.91–1.09)
PROTHROMBIN TIME: 21.4 SECONDS (ref 10–13.8)

## 2019-11-05 PROCEDURE — G0463 HOSPITAL OUTPT CLINIC VISIT: HCPCS

## 2019-11-05 PROCEDURE — 85610 PROTHROMBIN TIME: CPT

## 2019-11-05 PROCEDURE — 36416 COLLJ CAPILLARY BLOOD SPEC: CPT

## 2019-11-05 NOTE — PROGRESS NOTES
Anticoagulation Clinic Progress Note    Anticoagulation Summary  As of 2019    INR goal:   2.0-3.0   TTR:   65.6 % (1.1 y)   INR used for dosin.8! (2019)   Warfarin maintenance plan:   2.5 mg every Sat; 5 mg all other days   Weekly warfarin total:   32.5 mg   Plan last modified:   Rusty Interiano RPH (2019)   Next INR check:   2019   Priority:   High   Target end date:   Indefinite    Indications    Atrial fibrillation  persistent [I48.19]             Anticoagulation Episode Summary     INR check location:       Preferred lab:       Send INR reminders to:   [facility]    Comments:         Anticoagulation Care Providers     Provider Role Specialty Phone number    Mary Grace Culp MD Referring Cardiology [phone]          Clinic Interview:  Patient Findings     Positives:   Signs/symptoms of bleeding    Negatives:   Signs/symptoms of thrombosis, Laboratory test error   suspected, Change in health, Change in alcohol use, Change in activity,   Upcoming invasive procedure, Emergency department visit, Upcoming dental   procedure, Missed doses, Extra doses, Change in medications, Change in   diet/appetite, Hospital admission, Bruising, Other complaints    Comments:   Pt reports some bloody stools but not as bad as they were   before colonoscopy; GI reports if bleeding continues >1 week to contact   them; will see if pt still having symptoms in a week.       Clinical Outcomes     Negatives:   Major bleeding event, Thromboembolic event,   Anticoagulation-related hospital admission, Anticoagulation-related ED   visit, Anticoagulation-related fatality    Comments:   Pt reports some bloody stools but not as bad as they were   before colonoscopy; GI reports if bleeding continues >1 week to contact   them; will see if pt still having symptoms in a week.         INR History:  Anticoagulation Monitoring 10/21/2019 10/28/2019 2019   INR 2.7 1.32 1.8   INR Date 10/21/2019 10/28/2019  11/5/2019   INR Goal 2.0-3.0 2.0-3.0 2.0-3.0   Trend Same Same Same   Last Week Total 32.5 mg 15 mg 32.5 mg   Next Week Total 15 mg 32.5 mg 35 mg   Sun Hold (10/27); Otherwise 5 mg 5 mg 5 mg   Mon 5 mg 5 mg -   Tue 5 mg 5 mg 7.5 mg (11/5)   Wed 5 mg 5 mg 5 mg   Thu Hold (10/24); Otherwise 5 mg 5 mg 5 mg   Fri Hold (10/25); Otherwise 5 mg 5 mg 5 mg   Sat Hold (10/26); Otherwise 2.5 mg 2.5 mg 2.5 mg   Visit Report - - -   Some recent data might be hidden       Plan:  1. INR is Subtherapeutic today- see above in Anticoagulation Summary.  Will instruct Brittany Villeda to Continue their warfarin regimen and give 7.5 mg today instead of 5 mg. I would like to ensure the pt is within the goal range when she visits on 11/11 and to see if bleeding gets worse when in the goal range of 2 to 3- see above in Anticoagulation Summary.  2. Follow up in 1 week  3. Patient declines warfarin refills.  4. Verbal and written information provided. Patient expresses understanding and has no further questions at this time.    Shmuel Mccracken [facility]

## 2019-11-08 ENCOUNTER — TELEPHONE (OUTPATIENT)
Dept: GASTROENTEROLOGY | Facility: CLINIC | Age: 84
End: 2019-11-08

## 2019-11-08 NOTE — TELEPHONE ENCOUNTER
"Call to pt.  States wants rx to go to [facility] because more affordable and can get 90 day supply.     States continues to pass \"little clots\" of dk blood first thing in the morning.  Notes orange color when wipes.  States this less than has been, but is persisting.  Notes foul odor when passes gas.      Update to Dr English.   "

## 2019-11-08 NOTE — TELEPHONE ENCOUNTER
Received fax from [facility] regarding apriso script and salicylate allergy and possible cross sensitivity .      Called pt and left vm for pt to call back.      This was addressed on 10/25 with pt's  [facility] pharmacy.

## 2019-11-11 ENCOUNTER — ANTICOAGULATION VISIT (OUTPATIENT)
Dept: PHARMACY | Facility: HOSPITAL | Age: 84
End: 2019-11-11

## 2019-11-11 ENCOUNTER — TELEPHONE (OUTPATIENT)
Dept: GASTROENTEROLOGY | Facility: CLINIC | Age: 84
End: 2019-11-11

## 2019-11-11 DIAGNOSIS — I48.19 ATRIAL FIBRILLATION, PERSISTENT (HCC): ICD-10-CM

## 2019-11-11 LAB
INR PPP: 2.6 (ref 0.91–1.09)
PROTHROMBIN TIME: 31.2 SECONDS (ref 10–13.8)

## 2019-11-11 PROCEDURE — 36416 COLLJ CAPILLARY BLOOD SPEC: CPT

## 2019-11-11 PROCEDURE — 85610 PROTHROMBIN TIME: CPT

## 2019-11-11 RX ORDER — WARFARIN SODIUM 5 MG/1
TABLET ORAL
Qty: 90 TABLET | Refills: 0 | Status: SHIPPED | OUTPATIENT
Start: 2019-11-11 | End: 2020-02-04

## 2019-11-11 NOTE — PROGRESS NOTES
Anticoagulation Clinic Progress Note    Anticoagulation Summary  As of 2019    INR goal:   2.0-3.0   TTR:   65.7 % (1.1 y)   INR used for dosin.6 (2019)   Warfarin maintenance plan:   2.5 mg every Sat; 5 mg all other days   Weekly warfarin total:   32.5 mg   No change documented:   Emmanuel Mike RPH   Plan last modified:   Rusty Interiano RP (2019)   Next INR check:   2019   Priority:   Maintenance   Target end date:   Indefinite    Indications    Atrial fibrillation  persistent [I48.19]             Anticoagulation Episode Summary     INR check location:       Preferred lab:       Send INR reminders to:    [facility]    Comments:         Anticoagulation Care Providers     Provider Role Specialty Phone number    Mary Grace Culp MD Referring Cardiology [phone]          Clinic Interview:  Patient Findings     Negatives:   Signs/symptoms of thrombosis, Signs/symptoms of bleeding,   Laboratory test error suspected, Change in health, Change in alcohol use,   Change in activity, Upcoming invasive procedure, Emergency department   visit, Upcoming dental procedure, Missed doses, Extra doses, Change in   medications, Change in diet/appetite, Hospital admission, Bruising, Other   complaints      Clinical Outcomes     Negatives:   Major bleeding event, Thromboembolic event,   Anticoagulation-related hospital admission, Anticoagulation-related ED   visit, Anticoagulation-related fatality        INR History:  Anticoagulation Monitoring 10/28/2019 2019 2019   INR 1.32 1.8 2.6   INR Date 10/28/2019 2019 2019   INR Goal 2.0-3.0 2.0-3.0 2.0-3.0   Trend Same Same Same   Last Week Total 15 mg 32.5 mg 35 mg   Next Week Total 32.5 mg 35 mg 32.5 mg   Sun 5 mg 5 mg 5 mg   Mon 5 mg - 5 mg   Tue 5 mg 7.5 mg () 5 mg   Wed 5 mg 5 mg 5 mg   Thu 5 mg 5 mg 5 mg   Fri 5 mg 5 mg 5 mg   Sat 2.5 mg 2.5 mg 2.5 mg   Visit Report - - -   Some recent data might be hidden        Plan:  1. INR is Therapeutic today- see above in Anticoagulation Summary.  Will instruct Brittany Villeda to Continue their warfarin regimen- see above in Anticoagulation Summary.  2. Follow up in 2 weeks  3. Patient desires warfarin refills. Refill sent to [facility] mail order pharmacy.  4. Verbal and written information provided. Patient expresses understanding and has no further questions at this time.    Emmanuel Mike, [facility]

## 2019-11-11 NOTE — TELEPHONE ENCOUNTER
Warfarin 5 mg tablet: Take half tab (2.5 mg) on Saturdays. Take 1 tablet (5 mg) all other days.     Refill sent to [facility] mail order pharmacy during clinic visit.

## 2019-11-12 NOTE — TELEPHONE ENCOUNTER
Called pt and advised per Dr English that 2 of the polyps bx showed adenaomtous change.  This is not cancerous but is considered potentially precancerous.  She can defer further c/s due to age.     Also advised to continue apriso for uc and f/u with her in Jan.  Pt verb understanding and reports that [facility] is telling her that they will not fill due to her aspirin allergy.  Advised pt that she has been on mesalmine yrs without problems.    Called [facility] and spoke with pharmacist Yovana and advised per Dr English that pt has been on mesalamine for years and is ok to have her apriso filled.  She verb understanding and will refill script.      Called pt and advised of this.  Pt verb understanding and is asking if there is anything she can take for her foul smelling gas. Advised will send message to Dr English.

## 2019-11-15 NOTE — TELEPHONE ENCOUNTER
Called pt and advised per Dr English that she can try gasx , beano or phazyme. Pt verb understanding.

## 2019-11-20 ENCOUNTER — OFFICE VISIT (OUTPATIENT)
Dept: FAMILY MEDICINE CLINIC | Facility: CLINIC | Age: 84
End: 2019-11-20

## 2019-11-20 ENCOUNTER — HOSPITAL ENCOUNTER (OUTPATIENT)
Dept: CT IMAGING | Facility: HOSPITAL | Age: 84
Discharge: HOME | End: 2019-11-20

## 2019-11-20 ENCOUNTER — HOSPITAL ENCOUNTER (OUTPATIENT)
Dept: ULTRASOUND IMAGING | Facility: HOSPITAL | Age: 84
Discharge: HOME | End: 2019-11-20
Admitting: NURSE PRACTITIONER

## 2019-11-20 ENCOUNTER — HOSPITAL ENCOUNTER (OUTPATIENT)
Dept: ULTRASOUND IMAGING | Facility: HOSPITAL | Age: 84
Discharge: HOME | End: 2019-11-20

## 2019-11-20 VITALS
BODY MASS INDEX: 38.41 KG/M2 | HEIGHT: 64 IN | HEART RATE: 78 BPM | WEIGHT: 225 LBS | RESPIRATION RATE: 22 BRPM | OXYGEN SATURATION: 98 % | DIASTOLIC BLOOD PRESSURE: 80 MMHG | TEMPERATURE: 98.1 F | SYSTOLIC BLOOD PRESSURE: 128 MMHG

## 2019-11-20 DIAGNOSIS — R10.2 ACUTE PELVIC PAIN: Primary | ICD-10-CM

## 2019-11-20 DIAGNOSIS — R10.2 ACUTE PELVIC PAIN: ICD-10-CM

## 2019-11-20 PROCEDURE — 76857 US EXAM PELVIC LIMITED: CPT

## 2019-11-20 PROCEDURE — 74176 CT ABD & PELVIS W/O CONTRAST: CPT

## 2019-11-20 PROCEDURE — 76830 TRANSVAGINAL US NON-OB: CPT

## 2019-11-20 PROCEDURE — 99214 OFFICE O/P EST MOD 30 MIN: CPT | Performed by: NURSE PRACTITIONER

## 2019-11-20 NOTE — PROGRESS NOTES
Subjective   Brittany Villeda is a 84 y.o. female.     Chief Complaint   Patient presents with   • Flank Pain     left side      HPI New patient to me.  Here with daughter for left lower pelvic pain x appr 1 month.  Began a week or so prior to colonoscopy and seems to be getting worse.  Pain is all the time.  Sitting up straight in chair and bending forward to tie her show exacerbate pain.  Pain is located in left groin-at first thought her panties were too tight, but that was not the case.  Not had this in the past. Took some tylenol without relief. Denies any urinary, vaginal, bowel changes. Food, bowel movement, urinating do not change the pain one way or another.           Social History     Tobacco Use   • Smoking status: Former Smoker     Packs/day: 1.50     Years: 10.00     Pack years: 15.00     Start date:      Last attempt to quit: 1965     Years since quittin.9   • Smokeless tobacco: Never Used   • Tobacco comment: QUIT SMOKING    Substance Use Topics   • Alcohol use: No     Comment: Daily caffeine use - one cup of coffee   • Drug use: Defer       The following portions of the patient's history were reviewed and updated as appropriate: allergies, current medications, past family history, past medical history, past social history, past surgical history and problem list.    Review of Systems   Constitutional: Negative for activity change, appetite change, chills, fever and unexpected weight change.   Respiratory: Negative for cough and shortness of breath.    Cardiovascular: Negative for chest pain and palpitations.   Gastrointestinal: Positive for anal bleeding and blood in stool. Negative for abdominal distention, abdominal pain, constipation, diarrhea, nausea, rectal pain and vomiting.         Anal bleeding and blood in stool lessening  since colonoscopy   Genitourinary: Positive for pelvic pain. Negative for difficulty urinating, dysuria, enuresis, flank pain, frequency, hematuria,  "urgency, vaginal bleeding, vaginal discharge and vaginal pain.   Musculoskeletal: Positive for gait problem. Negative for joint swelling.   Neurological: Negative for weakness and numbness.       Objective   Blood pressure 128/80, pulse 78, temperature 98.1 °F (36.7 °C), temperature source Tympanic, resp. rate 22, height 162.6 cm (64\"), weight 102 kg (225 lb), SpO2 98 %.  Body mass index is 38.62 kg/m².    Physical Exam   Constitutional: She is oriented to person, place, and time. She appears well-developed and well-nourished. No distress.   HENT:   Head: Normocephalic and atraumatic.   Eyes: Conjunctivae are normal. Right eye exhibits no discharge. Left eye exhibits no discharge.   Cardiovascular: Normal rate and regular rhythm.   Pulmonary/Chest: Effort normal and breath sounds normal.   Abdominal: Soft. Bowel sounds are normal. There is no tenderness. Hernia confirmed negative in the right inguinal area and confirmed negative in the left inguinal area.   Genitourinary: Vagina normal. Pelvic exam was performed with patient supine. There is no rash on the right labia. There is no rash on the left labia. No vaginal discharge found.   Genitourinary Comments: Localized, focal TTP left pelvis just deep from left pelvis-pain radiates to left inguinal area    Pain is not focused on bone       Musculoskeletal: She exhibits no deformity.   Lymphadenopathy: No inguinal adenopathy noted on the right or left side.   Neurological: She is alert and oriented to person, place, and time.   Skin: Skin is warm and dry. She is not diaphoretic.   Psychiatric: She has a normal mood and affect.   Nursing note and vitals reviewed.      Assessment   Problem List Items Addressed This Visit     None      Visit Diagnoses     Acute pelvic pain    -  Primary    Relevant Orders    US Pelvis Complete    US Non-ob Transvaginal    US Testicular or Ovarian Vascular Limited    CT Abdomen Pelvis Without Contrast           Procedures       "     Impression and Plan:  US ordered to eval pain.  Could be hernia related.  Does not seem to be colon or bladder related. Prior CT scan abd/pelvis from this past May did not show any abnormalities pelvis.     Late entry: spoke with US tech after pelvic US completed and did not show any acute findings but radiologist recommended CT scan with contrast.  Patient and FM returned to office to discuss this and are agreeable for CT scan.  I did not order contrast due to contrast allergy.   Later entry:  Spoke with radiologist with preliminary findings CT.  No acute concerning findings but patient is noted to have b/l small inguinal hernias.  The right has a little bowel, but not strangulated and the left has small amount fat.  Patient and FM returned to office to review this and will watch and wait prior to seeking surgical intervention which is reasonable-she is relieved to know that nothing acute was found.  She will try heat and prn tylenol to manage and follow up with PCP prn. We discussed s/s requiring emergent tx.         Health Maintenance Due   Topic Date Due   • ANNUAL PHYSICAL  04/08/1938              EMR Dragon/Transcription disclaimer:   Much of this encounter note is an electronic transcription/translation of spoken language to printed text. The electronic translation of spoken language may permit erroneous, or at times, nonsensical words or phrases to be inadvertently transcribed; Although I have reviewed the note for such errors, some may still exist.

## 2019-11-22 ENCOUNTER — TELEPHONE (OUTPATIENT)
Dept: GASTROENTEROLOGY | Facility: CLINIC | Age: 84
End: 2019-11-22

## 2019-11-22 RX ORDER — MESALAMINE 0.38 G/1
CAPSULE, EXTENDED RELEASE ORAL
Qty: 360 CAPSULE | Refills: 1 | Status: SHIPPED | OUTPATIENT
Start: 2019-11-22 | End: 2020-03-09

## 2019-11-22 NOTE — TELEPHONE ENCOUNTER
Called pt and advised that we have corrected her apriso to be 4 tablets per day.  ADvised pt we have resent this to her [facility]. Pt verb understanding.

## 2019-11-22 NOTE — TELEPHONE ENCOUNTER
----- Message from Matty Wagoner Rep sent at 11/22/2019  9:17 AM EST -----  Regarding: omeprazole ([facility]  Contact: [phone]  Pt is calling because she said she is suppose to be taking 4 a day but it was called in for just one a day.

## 2019-11-26 ENCOUNTER — ANTICOAGULATION VISIT (OUTPATIENT)
Dept: PHARMACY | Facility: HOSPITAL | Age: 84
End: 2019-11-26

## 2019-11-26 DIAGNOSIS — I48.19 ATRIAL FIBRILLATION, PERSISTENT (HCC): ICD-10-CM

## 2019-11-26 LAB
INR PPP: 2.8 (ref 0.91–1.09)
PROTHROMBIN TIME: 33.4 SECONDS (ref 10–13.8)

## 2019-11-26 PROCEDURE — 36416 COLLJ CAPILLARY BLOOD SPEC: CPT

## 2019-11-26 PROCEDURE — 85610 PROTHROMBIN TIME: CPT

## 2019-11-26 NOTE — PROGRESS NOTES
Anticoagulation Clinic Progress Note    Anticoagulation Summary  As of 2019    INR goal:   2.0-3.0   TTR:   67.0 % (1.1 y)   INR used for dosin.8 (2019)   Warfarin maintenance plan:   2.5 mg every Sat; 5 mg all other days   Weekly warfarin total:   32.5 mg   No change documented:   Seema Lambert   Plan last modified:   Rusty Interiano RPH (2019)   Next INR check:   2019   Priority:   Maintenance   Target end date:   Indefinite    Indications    Atrial fibrillation  persistent [I48.19]             Anticoagulation Episode Summary     INR check location:       Preferred lab:       Send INR reminders to:    [facility]    Comments:         Anticoagulation Care Providers     Provider Role Specialty Phone number    Mary Grace Culp MD Referring Cardiology [phone]          Clinic Interview:  Patient Findings     Negatives:   Signs/symptoms of thrombosis, Signs/symptoms of bleeding,   Laboratory test error suspected, Change in health, Change in alcohol use,   Change in activity, Upcoming invasive procedure, Emergency department   visit, Upcoming dental procedure, Missed doses, Extra doses, Change in   medications, Change in diet/appetite, Hospital admission, Bruising, Other   complaints      Clinical Outcomes     Negatives:   Major bleeding event, Thromboembolic event,   Anticoagulation-related hospital admission, Anticoagulation-related ED   visit, Anticoagulation-related fatality        INR History:  Anticoagulation Monitoring 2019   INR 1.8 2.6 2.8   INR Date 2019   INR Goal 2.0-3.0 2.0-3.0 2.0-3.0   Trend Same Same Same   Last Week Total 32.5 mg 35 mg 32.5 mg   Next Week Total 35 mg 32.5 mg 32.5 mg   Sun 5 mg 5 mg 5 mg   Mon - 5 mg 5 mg   Tue 7.5 mg () 5 mg 5 mg   Wed 5 mg 5 mg 5 mg   Thu 5 mg 5 mg 5 mg   Fri 5 mg 5 mg 5 mg   Sat 2.5 mg 2.5 mg 2.5 mg   Visit Report - - -   Some recent data might be hidden        Plan:  1. INR is therapeutic today- see above in Anticoagulation Summary.   Will instruct Brittany Villeda to continue their warfarin regimen- see above in Anticoagulation Summary.  2. Follow up in 4.5 weeks.  3. Patient declines warfarin refills.  4. Verbal and written information provided. Patient expresses understanding and has no further questions at this time.    Seema Lambert

## 2019-12-12 ENCOUNTER — APPOINTMENT (OUTPATIENT)
Dept: LAB | Facility: HOSPITAL | Age: 84
End: 2019-12-12

## 2019-12-13 ENCOUNTER — CLINICAL SUPPORT (OUTPATIENT)
Dept: ORTHOPEDIC SURGERY | Facility: CLINIC | Age: 84
End: 2019-12-13

## 2019-12-13 VITALS — BODY MASS INDEX: 36.54 KG/M2 | WEIGHT: 214 LBS | HEIGHT: 64 IN | TEMPERATURE: 98 F

## 2019-12-13 DIAGNOSIS — M19.90 ARTHRITIS: Primary | ICD-10-CM

## 2019-12-13 PROCEDURE — 20610 DRAIN/INJ JOINT/BURSA W/O US: CPT | Performed by: ORTHOPAEDIC SURGERY

## 2019-12-13 RX ORDER — METHYLPREDNISOLONE ACETATE 80 MG/ML
80 INJECTION, SUSPENSION INTRA-ARTICULAR; INTRALESIONAL; INTRAMUSCULAR; SOFT TISSUE
Status: COMPLETED | OUTPATIENT
Start: 2019-12-13 | End: 2019-12-13

## 2019-12-13 RX ADMIN — METHYLPREDNISOLONE ACETATE 80 MG: 80 INJECTION, SUSPENSION INTRA-ARTICULAR; INTRALESIONAL; INTRAMUSCULAR; SOFT TISSUE at 08:09

## 2019-12-13 RX ADMIN — METHYLPREDNISOLONE ACETATE 80 MG: 80 INJECTION, SUSPENSION INTRA-ARTICULAR; INTRALESIONAL; INTRAMUSCULAR; SOFT TISSUE at 08:10

## 2019-12-13 NOTE — PROGRESS NOTES
Patient: Brittany Villeda  YOB: 1935  Date of Service: 12/13/2019    Chief Complaints:   Chief Complaint   Patient presents with   • Right Shoulder - Follow-up   • Left Shoulder - Follow-up   Bilateral shoulder pain    Subjective:    History of Present Illness: Pt is seen in the office today with complaints of Chief Complaint   Patient presents with   • Right Shoulder - Follow-up   • Left Shoulder - Follow-up   .    Patient has bilateral shoulder pain with degenerative changes she gets intermittent injection she did get some relief from those      Allergies:   Allergies   Allergen Reactions   • Codeine Hallucinations   • Amitriptyline Rash   • Amoxicillin-Pot Clavulanate Rash   • Aspirin Unknown (See Comments)     Patient doesn't know why   • Bactrim [Sulfamethoxazole-Trimethoprim] Rash   • Carisoprodol-Aspirin-Codeine Palpitations   • Iodinated Diagnostic Agents Rash   • Latex Rash   • Naproxen Rash   • Nsaids Unknown (See Comments)     unkknown   • Soma Compound With Codeine [Carisoprodol-Aspirin-Codeine] Rash   • Sulfa Antibiotics Rash   • Tramadol Palpitations     heart races        Medications:   Home Medications:  Current Outpatient Medications on File Prior to Visit   Medication Sig   • Acetaminophen (PAIN RELIEVER PO) Take 1 tablet by mouth As Needed.   • albuterol sulfate  (90 Base) MCG/ACT inhaler Inhale 2 puffs Every 6 (Six) Hours As Needed for Wheezing.   • calcium carbonate-cholecalciferol 500-400 MG-UNIT tablet tablet Take 1 tablet by mouth Daily.   • furosemide (LASIX) 20 MG tablet Take 20 mg by mouth 2 (Two) Times a Day.   • gabapentin (NEURONTIN) 300 MG capsule Take 1 capsule by mouth 2 (Two) Times a Day.   • mesalamine (APRISO) 0.375 g 24 hr capsule Take 4 tablets by mouth daily   • omeprazole (priLOSEC) 20 MG capsule TAKE 1 CAPSULE EVERY DAY   • vitamin B-12 (CYANOCOBALAMIN) 1000 MCG tablet Take 1,000 mcg by mouth Daily.   • warfarin (COUMADIN) 5 MG tablet TAKE HALF  TABLET (2.5 MG) BY MOUTH ON SATURDAYS. TAKE 1 TABLET (5 MG) BY MOUTH ALL OTHER DAYS     No current facility-administered medications on file prior to visit.      Current Medications:  Scheduled Meds:  Continuous Infusions:  No current facility-administered medications for this visit.   PRN Meds:.    I have reviewed the patient's medical history in detail and updated the computerized patient record.  Review and summarization of old records include:    Past Medical History:   Diagnosis Date   • Anemia    • Arrhythmia    • Arthritis    • Asthma    • Bradycardia    • Chest pain    • Colitis    • COPD (chronic obstructive pulmonary disease) (CMS/HCC)    • Diastolic dysfunction    • Essential hypertension 5/12/2016   • GERD (gastroesophageal reflux disease)    • Heart block    • Hypertension    • Hypertensive heart disease    • Hyperthyroidism    • Kidney stone    • Leukopenia    • Low back pain    • MGUS (monoclonal gammopathy of unknown significance)    • Nephrolithiasis    • Obesity    • Paroxysmal atrial fibrillation (CMS/HCC)    • Peptic ulceration    • PSVT (paroxysmal supraventricular tachycardia) (CMS/HCC)    • Pulmonary hypertension (CMS/HCC)    • Rectal bleed    • Sleep apnea    • Trifascicular block    • 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)    • Ventricular tachycardia (CMS/HCC)     nonsustained   • Vertigo         Past Surgical History:   Procedure Laterality Date   • BACK SURGERY      lumbar fusion   • CARDIAC ELECTROPHYSIOLOGY PROCEDURE N/A 11/7/2018    Procedure: Pacemaker DC new   [place];  Surgeon: Jesus Granda MD;  Location: [facility] CATH INVASIVE LOCATION;  Service: Cardiology   • CHOLECYSTECTOMY     • COLONOSCOPY  06/16/2014    colitis, cryptitis,  tics, NBIH, TA w/low grade dysplasia   • COLONOSCOPY N/A 10/28/2019    Procedure: COLONOSCOPY WITH COLD AND HOT POLYPECTOMIES;  Surgeon: Micaela English MD;  Location: [facility] ENDOSCOPY;  Service: Gastroenterology   •  HEMORRHOIDECTOMY     • HYSTERECTOMY     • KNEE ARTHROPLASTY     • MYOMECTOMY     • SHOULDER SURGERY     • SINUS SURGERY     • TOE NAIL AMPUTATION  2019   • TONSILLECTOMY          Social History     Occupational History   • Occupation: Caregiver     Employer: RETIRED     Comment: worked for [employer]   Tobacco Use   • Smoking status: Former Smoker     Packs/day: 1.50     Years: 10.00     Pack years: 15.00     Start date:      Last attempt to quit: 1965     Years since quittin.9   • Smokeless tobacco: Never Used   • Tobacco comment: QUIT SMOKING    Substance and Sexual Activity   • Alcohol use: No     Comment: Daily caffeine use - one cup of coffee   • Drug use: Defer   • Sexual activity: Defer    Social History     Social History Narrative   • Not on file        Family History   Problem Relation Age of Onset   • Diabetes Mother    • Breast cancer Sister    • Kidney cancer Sister    • Heart disease Sister    • Prostate cancer Brother    • Prostate cancer Brother    • Prostate cancer Brother    • Malig Hyperthermia Neg Hx        ROS: 14 point review of systems was performed and was negative except for documented findings in HPI and today's encounter.     Allergies:   Allergies   Allergen Reactions   • Codeine Hallucinations   • Amitriptyline Rash   • Amoxicillin-Pot Clavulanate Rash   • Aspirin Unknown (See Comments)     Patient doesn't know why   • Bactrim [Sulfamethoxazole-Trimethoprim] Rash   • Carisoprodol-Aspirin-Codeine Palpitations   • Iodinated Diagnostic Agents Rash   • Latex Rash   • Naproxen Rash   • Nsaids Unknown (See Comments)     unkknown   • Soma Compound With Codeine [Carisoprodol-Aspirin-Codeine] Rash   • Sulfa Antibiotics Rash   • Tramadol Palpitations     heart races      Constitutional:  Denies fever, shaking or chills   Eyes:  Denies change in visual acuity   HENT:  Denies nasal congestion or sore throat   Respiratory:  Denies cough or shortness of breath    Cardiovascular:  Denies chest pain or severe LE edema   GI:  Denies abdominal pain, nausea, vomiting, bloody stools or diarrhea   Musculoskeletal:  Numbness, tingling, or loss of motor function only as noted above in history of present illness.  : Denies painful urination or hematuria  Integument:  Denies rash, lesion or ulceration   Neurologic:  Denies headache or focal weakness  Endocrine:  Denies lymphadenopathy  Psych:  Denies confusion or change in mental status   Hem:  Denies active bleeding      Physical Exam: 84 y.o. female  Wt Readings from Last 3 Encounters:   12/13/19 97.1 kg (214 lb)   11/20/19 102 kg (225 lb)   10/25/19 96.2 kg (212 lb)       Body mass index is 36.73 kg/m².  Facility age limit for growth percentiles is 20 years.  Vitals:    12/13/19 0756   Temp: 98 °F (36.7 °C)     Vital signs reviewed.   General Appearance:    Alert, cooperative, in no acute distress                  Eyes: conjunctiva clear  ENT: external ears and nose atraumatic  CV: no peripheral edema  Resp: normal respiratory effort  Skin: no rashes or wounds; normal turgor  Psych: mood and affect appropriate  Lymph: no nodes appreciated  Neuro: gross sensation intact  Vascular:  Palpable peripheral pulse in noted extremity    Ortho exam      Exam demonstrates limited range of motion this is unchanged from last exam           Assessment: Bilateral shoulder DJD    Plan: Injection  Follow up as indicated.  Ice, elevate, and rest as needed.  NEPTALI Lovell M.D.        Large Joint Arthrocentesis: L glenohumeral  Date/Time: 12/13/2019 8:09 AM  Consent given by: patient  Site marked: site marked  Timeout: Immediately prior to procedure a time out was called to verify the correct patient, procedure, equipment, support staff and site/side marked as required   Supporting Documentation  Indications: pain   Procedure Details  Location: shoulder - L glenohumeral  Preparation: Patient was prepped and draped in the usual sterile  fashion  Needle size: 25 G  Approach: posterior  Medications administered: 80 mg methylPREDNISolone acetate 80 MG/ML; 4 mL lidocaine (cardiac)  Patient tolerance: patient tolerated the procedure well with no immediate complications      Large Joint Arthrocentesis: R glenohumeral  Date/Time: 12/13/2019 8:10 AM  Consent given by: patient  Site marked: site marked  Timeout: Immediately prior to procedure a time out was called to verify the correct patient, procedure, equipment, support staff and site/side marked as required   Supporting Documentation  Indications: pain   Procedure Details  Location: shoulder - R glenohumeral  Preparation: Patient was prepped and draped in the usual sterile fashion  Needle size: 22 G  Approach: posterior  Medications administered: 4 mL lidocaine (cardiac); 80 mg methylPREDNISolone acetate 80 MG/ML  Patient tolerance: patient tolerated the procedure well with no immediate complications

## 2019-12-17 ENCOUNTER — OFFICE VISIT (OUTPATIENT)
Dept: FAMILY MEDICINE CLINIC | Facility: CLINIC | Age: 84
End: 2019-12-17

## 2019-12-17 VITALS
SYSTOLIC BLOOD PRESSURE: 128 MMHG | HEIGHT: 64 IN | WEIGHT: 222.5 LBS | TEMPERATURE: 96.9 F | BODY MASS INDEX: 37.98 KG/M2 | DIASTOLIC BLOOD PRESSURE: 69 MMHG | HEART RATE: 70 BPM | OXYGEN SATURATION: 97 %

## 2019-12-17 DIAGNOSIS — M19.019 SHOULDER ARTHRITIS: Primary | ICD-10-CM

## 2019-12-17 PROCEDURE — 99213 OFFICE O/P EST LOW 20 MIN: CPT | Performed by: FAMILY MEDICINE

## 2019-12-17 RX ORDER — HYDROCODONE BITARTRATE AND ACETAMINOPHEN 5; 325 MG/1; MG/1
TABLET ORAL
Qty: 20 TABLET | Refills: 0 | Status: SHIPPED | OUTPATIENT
Start: 2019-12-17 | End: 2020-04-17

## 2019-12-17 NOTE — PROGRESS NOTES
"Subjective   Brittanyyuri Villeda is a 84 y.o. female.     Chief Complaint   Patient presents with   • Shoulder Pain     left shoulder for a while and keeps getting worse the injects are not helping         History of Present Illness    Acute on chronic left shoulder pain.  Recently seen by orthopedic surgery.  Seen last week.  Had a steroid injection both shoulders.  Right shoulder feeling better but his left shoulder still a problem.  Becoming a quality of life issue.  Severe pain at times that is affecting her.  Negative depression screen.  Here with daughter.  She has decreased range of motion of the shoulder.  Cannot abduct.  She has reported bone-on-bone arthritis.  She wants to have surgery.  She has multiple core morbidities including pulmonary hypertension and atrial fibrillation.  Continues on warfarin.  She has chronic back pain.  Continues gabapentin 300 mg twice a day.  Higher doses she is too sedated.  The Tylenol which she takes twice a day is helping.      The following portions of the patient's history were reviewed and updated as appropriate: allergies, current medications, past family history, past medical history, past social history, past surgical history and problem list.          Review of Systems   Constitutional: Negative.    Respiratory: Negative.    Cardiovascular: Negative.    Musculoskeletal: Positive for arthralgias.   Psychiatric/Behavioral: Negative for dysphoric mood.       Objective   Blood pressure 128/69, pulse 70, temperature 96.9 °F (36.1 °C), temperature source Oral, height 162.6 cm (64.02\"), weight 101 kg (222 lb 8 oz), SpO2 97 %.  Physical Exam   Constitutional:   Tired appearing but nontoxic   HENT:   Head: Atraumatic.   Cardiovascular: Normal rate.   Pulmonary/Chest: Effort normal.   Musculoskeletal:   Shoulder with decreased range of motion.  She cannot abduct past about 80 degrees.  Questionable adhesive capsulitis.       Assessment/Plan   Brittany was seen today for " shoulder pain.    Diagnoses and all orders for this visit:    Shoulder arthritis  -     HYDROcodone-acetaminophen (NORCO) 5-325 MG per tablet; 1/2-1 tab po bid prn severe L shoulder pain. Do not take with tylenol.      Acute on chronic left shoulder pain with known shoulder osteoarthritis.  I recommend she get back to her surgeon for further discussion.  Although I do not believe she is a good surgical candidate.  She is continuing the Tylenol twice a day.  For very severe pain on severe days, substitute hydrocodone for Tylenol.  She is going to take one half of a hydrocodone once or twice a day as needed.  Number 20 pills last couple months.  She understands take this sparingly.  May cause interactions with her other medications.  May have similar side effects as codeine.  Stop with this mental status change her sedation.  She understands increased fall risk.  She understands risk of constipation.  She understands this is a controlled substance.  Okay to take with her gabapentin.    Follow-up as scheduled.  Sooner as needed

## 2019-12-19 ENCOUNTER — APPOINTMENT (OUTPATIENT)
Dept: LAB | Facility: HOSPITAL | Age: 84
End: 2019-12-19

## 2019-12-31 ENCOUNTER — ANTICOAGULATION VISIT (OUTPATIENT)
Dept: PHARMACY | Facility: HOSPITAL | Age: 84
End: 2019-12-31

## 2019-12-31 DIAGNOSIS — I48.19 ATRIAL FIBRILLATION, PERSISTENT (HCC): ICD-10-CM

## 2019-12-31 LAB
INR PPP: 2.8 (ref 0.91–1.09)
PROTHROMBIN TIME: 33.2 SECONDS (ref 10–13.8)

## 2019-12-31 PROCEDURE — 85610 PROTHROMBIN TIME: CPT

## 2019-12-31 PROCEDURE — 36416 COLLJ CAPILLARY BLOOD SPEC: CPT

## 2019-12-31 NOTE — PROGRESS NOTES
Anticoagulation Clinic Progress Note    Anticoagulation Summary  As of 2019    INR goal:   2.0-3.0   TTR:   69.5 % (1.2 y)   INR used for dosin.8 (2019)   Warfarin maintenance plan:   2.5 mg every Sat; 5 mg all other days   Weekly warfarin total:   32.5 mg   No change documented:   Cherelle Montague   Plan last modified:   Rusty Inter[facility] (2019)   Next INR check:   2020   Priority:   Maintenance   Target end date:   Indefinite    Indications    Atrial fibrillation  persistent [I48.19]             Anticoagulation Episode Summary     INR check location:       Preferred lab:       Send INR reminders to:    [facility]    Comments:         Anticoagulation Care Providers     Provider Role Specialty Phone number    Mary Grace Culp MD Referring Cardiology [phone]          Clinic Interview:  Patient Findings     Negatives:   Signs/symptoms of thrombosis, Signs/symptoms of bleeding,   Laboratory test error suspected, Change in health, Change in alcohol use,   Change in activity, Upcoming invasive procedure, Emergency department   visit, Upcoming dental procedure, Missed doses, Extra doses, Change in   medications, Change in diet/appetite, Hospital admission, Bruising, Other   complaints      Clinical Outcomes     Negatives:   Major bleeding event, Thromboembolic event,   Anticoagulation-related hospital admission, Anticoagulation-related ED   visit, Anticoagulation-related fatality        INR History:  Anticoagulation Monitoring 2019   INR 2.6 2.8 2.8   INR Date 2019   INR Goal 2.0-3.0 2.0-3.0 2.0-3.0   Trend Same Same Same   Last Week Total 35 mg 32.5 mg 32.5 mg   Next Week Total 32.5 mg 32.5 mg 32.5 mg   Sun 5 mg 5 mg 5 mg   Mon 5 mg 5 mg 5 mg   Tue 5 mg 5 mg 5 mg   Wed 5 mg 5 mg 5 mg   Thu 5 mg 5 mg 5 mg   Fri 5 mg 5 mg 5 mg   Sat 2.5 mg 2.5 mg 2.5 mg   Visit Report - - -   Some recent data might be hidden        Plan:  1. INR is therapeutic today- see above in Anticoagulation Summary.   Will instruct Brittany Villeda to continue their warfarin regimen- see above in Anticoagulation Summary.  2. Follow up in 5 weeks.  3. Patient declines warfarin refills.  4. Verbal and written information provided. Patient expresses understanding and has no further questions at this time.    Cherelle Montague

## 2020-01-03 ENCOUNTER — LAB (OUTPATIENT)
Dept: LAB | Facility: HOSPITAL | Age: 85
End: 2020-01-03

## 2020-01-03 DIAGNOSIS — D70.8 OTHER NEUTROPENIA (HCC): ICD-10-CM

## 2020-01-03 DIAGNOSIS — D47.2 MGUS (MONOCLONAL GAMMOPATHY OF UNKNOWN SIGNIFICANCE): ICD-10-CM

## 2020-01-03 DIAGNOSIS — K51.30 ULCERATIVE RECTOSIGMOIDITIS WITHOUT COMPLICATION (HCC): ICD-10-CM

## 2020-01-03 LAB
ALBUMIN SERPL-MCNC: 4.1 G/DL (ref 3.5–5.2)
ALBUMIN/GLOB SERPL: 1.1 G/DL (ref 1.1–2.4)
ALP SERPL-CCNC: 73 U/L (ref 38–116)
ALT SERPL W P-5'-P-CCNC: 7 U/L (ref 0–33)
ANION GAP SERPL CALCULATED.3IONS-SCNC: 11.8 MMOL/L (ref 5–15)
AST SERPL-CCNC: 16 U/L (ref 0–32)
BASOPHILS # BLD AUTO: 0.01 10*3/MM3 (ref 0–0.2)
BASOPHILS NFR BLD AUTO: 0.3 % (ref 0–1.5)
BILIRUB SERPL-MCNC: 1.7 MG/DL (ref 0.2–1.2)
BUN BLD-MCNC: 30 MG/DL (ref 6–20)
BUN/CREAT SERPL: 32.3 (ref 7.3–30)
CALCIUM SPEC-SCNC: 10.4 MG/DL (ref 8.5–10.2)
CHLORIDE SERPL-SCNC: 103 MMOL/L (ref 98–107)
CO2 SERPL-SCNC: 29.2 MMOL/L (ref 22–29)
CREAT BLD-MCNC: 0.93 MG/DL (ref 0.6–1.1)
DEPRECATED RDW RBC AUTO: 54.7 FL (ref 37–54)
EOSINOPHIL # BLD AUTO: 0.01 10*3/MM3 (ref 0–0.4)
EOSINOPHIL NFR BLD AUTO: 0.3 % (ref 0.3–6.2)
ERYTHROCYTE [DISTWIDTH] IN BLOOD BY AUTOMATED COUNT: 14.4 % (ref 12.3–15.4)
FERRITIN SERPL-MCNC: 80 NG/ML (ref 13–150)
GFR SERPL CREATININE-BSD FRML MDRD: 70 ML/MIN/1.73
GLOBULIN UR ELPH-MCNC: 3.6 GM/DL (ref 1.8–3.5)
GLUCOSE BLD-MCNC: 102 MG/DL (ref 74–124)
HCT VFR BLD AUTO: 39.1 % (ref 34–46.6)
HGB BLD-MCNC: 12.7 G/DL (ref 12–15.9)
IMM GRANULOCYTES # BLD AUTO: 0 10*3/MM3 (ref 0–0.05)
IMM GRANULOCYTES NFR BLD AUTO: 0 % (ref 0–0.5)
IRON 24H UR-MRATE: 71 MCG/DL (ref 37–145)
IRON SATN MFR SERPL: 19 % (ref 14–48)
LYMPHOCYTES # BLD AUTO: 2.09 10*3/MM3 (ref 0.7–3.1)
LYMPHOCYTES NFR BLD AUTO: 61.1 % (ref 19.6–45.3)
MCH RBC QN AUTO: 33.4 PG (ref 26.6–33)
MCHC RBC AUTO-ENTMCNC: 32.5 G/DL (ref 31.5–35.7)
MCV RBC AUTO: 102.9 FL (ref 79–97)
MONOCYTES # BLD AUTO: 0.36 10*3/MM3 (ref 0.1–0.9)
MONOCYTES NFR BLD AUTO: 10.5 % (ref 5–12)
NEUTROPHILS # BLD AUTO: 0.95 10*3/MM3 (ref 1.7–7)
NEUTROPHILS NFR BLD AUTO: 27.8 % (ref 42.7–76)
NRBC BLD AUTO-RTO: 0 /100 WBC (ref 0–0.2)
PLATELET # BLD AUTO: 129 10*3/MM3 (ref 140–450)
PMV BLD AUTO: 8.6 FL (ref 6–12)
POTASSIUM BLD-SCNC: 3.9 MMOL/L (ref 3.5–4.7)
PROT SERPL-MCNC: 7.7 G/DL (ref 6.3–8)
RBC # BLD AUTO: 3.8 10*6/MM3 (ref 3.77–5.28)
SODIUM BLD-SCNC: 144 MMOL/L (ref 134–145)
TIBC SERPL-MCNC: 372 MCG/DL (ref 249–505)
TRANSFERRIN SERPL-MCNC: 266 MG/DL (ref 200–360)
WBC NRBC COR # BLD: 3.42 10*3/MM3 (ref 3.4–10.8)

## 2020-01-03 PROCEDURE — 83540 ASSAY OF IRON: CPT

## 2020-01-03 PROCEDURE — 36415 COLL VENOUS BLD VENIPUNCTURE: CPT

## 2020-01-03 PROCEDURE — 85025 COMPLETE CBC W/AUTO DIFF WBC: CPT

## 2020-01-03 PROCEDURE — 80053 COMPREHEN METABOLIC PANEL: CPT

## 2020-01-03 PROCEDURE — 82728 ASSAY OF FERRITIN: CPT

## 2020-01-03 PROCEDURE — 84466 ASSAY OF TRANSFERRIN: CPT

## 2020-01-06 LAB
ALBUMIN SERPL-MCNC: 3.4 G/DL (ref 2.9–4.4)
ALBUMIN/GLOB SERPL: 0.9 {RATIO} (ref 0.7–1.7)
ALPHA1 GLOB FLD ELPH-MCNC: 0.3 G/DL (ref 0–0.4)
ALPHA2 GLOB SERPL ELPH-MCNC: 0.7 G/DL (ref 0.4–1)
B-GLOBULIN SERPL ELPH-MCNC: 1.6 G/DL (ref 0.7–1.3)
GAMMA GLOB SERPL ELPH-MCNC: 1.4 G/DL (ref 0.4–1.8)
GLOBULIN SER CALC-MCNC: 4 G/DL (ref 2.2–3.9)
IGA SERPL-MCNC: 828 MG/DL (ref 64–422)
IGG SERPL-MCNC: 1471 MG/DL (ref 700–1600)
IGM SERPL-MCNC: 68 MG/DL (ref 26–217)
INTERPRETATION SERPL IEP-IMP: ABNORMAL
KAPPA LC SERPL-MCNC: 37 MG/L (ref 3.3–19.4)
KAPPA LC/LAMBDA SER: 0.97 {RATIO} (ref 0.26–1.65)
LAMBDA LC FREE SERPL-MCNC: 38.2 MG/L (ref 5.7–26.3)
Lab: ABNORMAL
M-SPIKE: ABNORMAL G/DL
PROT SERPL-MCNC: 7.4 G/DL (ref 6–8.5)

## 2020-01-10 ENCOUNTER — APPOINTMENT (OUTPATIENT)
Dept: OTHER | Facility: HOSPITAL | Age: 85
End: 2020-01-10

## 2020-01-10 ENCOUNTER — APPOINTMENT (OUTPATIENT)
Dept: ONCOLOGY | Facility: CLINIC | Age: 85
End: 2020-01-10

## 2020-01-10 RX ORDER — OMEPRAZOLE 20 MG/1
CAPSULE, DELAYED RELEASE ORAL
Qty: 90 CAPSULE | Refills: 0 | Status: SHIPPED | OUTPATIENT
Start: 2020-01-10 | End: 2020-03-24

## 2020-01-17 ENCOUNTER — CLINICAL SUPPORT NO REQUIREMENTS (OUTPATIENT)
Dept: CARDIOLOGY | Facility: CLINIC | Age: 85
End: 2020-01-17

## 2020-01-17 DIAGNOSIS — I44.2 COMPLETE HEART BLOCK (HCC): Primary | ICD-10-CM

## 2020-01-17 DIAGNOSIS — I48.19 ATRIAL FIBRILLATION, PERSISTENT (HCC): ICD-10-CM

## 2020-01-17 PROCEDURE — 93280 PM DEVICE PROGR EVAL DUAL: CPT | Performed by: INTERNAL MEDICINE

## 2020-01-22 ENCOUNTER — CLINICAL SUPPORT NO REQUIREMENTS (OUTPATIENT)
Dept: CARDIOLOGY | Facility: CLINIC | Age: 85
End: 2020-01-22

## 2020-01-22 DIAGNOSIS — I48.19 ATRIAL FIBRILLATION, PERSISTENT (HCC): ICD-10-CM

## 2020-01-22 DIAGNOSIS — I44.2 COMPLETE HEART BLOCK (HCC): Primary | ICD-10-CM

## 2020-01-26 ENCOUNTER — APPOINTMENT (OUTPATIENT)
Dept: CT IMAGING | Facility: HOSPITAL | Age: 85
End: 2020-01-26

## 2020-01-26 ENCOUNTER — HOSPITAL ENCOUNTER (EMERGENCY)
Facility: HOSPITAL | Age: 85
Discharge: HOME | End: 2020-01-26
Attending: EMERGENCY MEDICINE | Admitting: EMERGENCY MEDICINE

## 2020-01-26 VITALS
HEART RATE: 70 BPM | DIASTOLIC BLOOD PRESSURE: 73 MMHG | RESPIRATION RATE: 18 BRPM | SYSTOLIC BLOOD PRESSURE: 137 MMHG | HEIGHT: 64 IN | TEMPERATURE: 97.7 F | OXYGEN SATURATION: 97 % | BODY MASS INDEX: 35.34 KG/M2 | WEIGHT: 207 LBS

## 2020-01-26 DIAGNOSIS — R42 VERTIGO: Primary | ICD-10-CM

## 2020-01-26 LAB
ALBUMIN SERPL-MCNC: 3.1 G/DL (ref 3.5–5.2)
ALBUMIN/GLOB SERPL: 1.3 G/DL
ALP SERPL-CCNC: 53 U/L (ref 39–117)
ALT SERPL W P-5'-P-CCNC: 8 U/L (ref 1–33)
ANION GAP SERPL CALCULATED.3IONS-SCNC: 10 MMOL/L (ref 5–15)
ANISOCYTOSIS BLD QL: ABNORMAL
AST SERPL-CCNC: 15 U/L (ref 1–32)
BILIRUB SERPL-MCNC: 1.1 MG/DL (ref 0.2–1.2)
BUN BLD-MCNC: 19 MG/DL (ref 8–23)
BUN/CREAT SERPL: 26 (ref 7–25)
CALCIUM SPEC-SCNC: 8.4 MG/DL (ref 8.6–10.5)
CHLORIDE SERPL-SCNC: 108 MMOL/L (ref 98–107)
CO2 SERPL-SCNC: 24 MMOL/L (ref 22–29)
CREAT BLD-MCNC: 0.73 MG/DL (ref 0.57–1)
DEPRECATED RDW RBC AUTO: 48.1 FL (ref 37–54)
ERYTHROCYTE [DISTWIDTH] IN BLOOD BY AUTOMATED COUNT: 12.8 % (ref 12.3–15.4)
GFR SERPL CREATININE-BSD FRML MDRD: 92 ML/MIN/1.73
GLOBULIN UR ELPH-MCNC: 2.4 GM/DL
GLUCOSE BLD-MCNC: 81 MG/DL (ref 65–99)
HCT VFR BLD AUTO: 37.7 % (ref 34–46.6)
HGB BLD-MCNC: 12.2 G/DL (ref 12–15.9)
INR PPP: 2.33 (ref 0.9–1.1)
LYMPHOCYTES # BLD MANUAL: 1.73 10*3/MM3 (ref 0.7–3.1)
LYMPHOCYTES NFR BLD MANUAL: 1 % (ref 5–12)
LYMPHOCYTES NFR BLD MANUAL: 49.5 % (ref 19.6–45.3)
MACROCYTES BLD QL SMEAR: ABNORMAL
MCH RBC QN AUTO: 33 PG (ref 26.6–33)
MCHC RBC AUTO-ENTMCNC: 32.4 G/DL (ref 31.5–35.7)
MCV RBC AUTO: 101.9 FL (ref 79–97)
MONOCYTES # BLD AUTO: 0.04 10*3/MM3 (ref 0.1–0.9)
NEUTROPHILS # BLD AUTO: 1.66 10*3/MM3 (ref 1.7–7)
NEUTROPHILS NFR BLD MANUAL: 47.4 % (ref 42.7–76)
PLAT MORPH BLD: NORMAL
PLATELET # BLD AUTO: 129 10*3/MM3 (ref 140–450)
PMV BLD AUTO: 9.2 FL (ref 6–12)
POTASSIUM BLD-SCNC: 3.4 MMOL/L (ref 3.5–5.2)
PROT SERPL-MCNC: 5.5 G/DL (ref 6–8.5)
PROTHROMBIN TIME: 25.2 SECONDS (ref 11.7–14.2)
RBC # BLD AUTO: 3.7 10*6/MM3 (ref 3.77–5.28)
SODIUM BLD-SCNC: 142 MMOL/L (ref 136–145)
TROPONIN T SERPL-MCNC: <0.01 NG/ML (ref 0–0.03)
VARIANT LYMPHS NFR BLD MANUAL: 2.1 % (ref 0–5)
WBC MORPH BLD: NORMAL
WBC NRBC COR # BLD: 3.5 10*3/MM3 (ref 3.4–10.8)

## 2020-01-26 PROCEDURE — 70450 CT HEAD/BRAIN W/O DYE: CPT

## 2020-01-26 PROCEDURE — 96374 THER/PROPH/DIAG INJ IV PUSH: CPT

## 2020-01-26 PROCEDURE — 85007 BL SMEAR W/DIFF WBC COUNT: CPT | Performed by: EMERGENCY MEDICINE

## 2020-01-26 PROCEDURE — 93005 ELECTROCARDIOGRAM TRACING: CPT | Performed by: EMERGENCY MEDICINE

## 2020-01-26 PROCEDURE — 80053 COMPREHEN METABOLIC PANEL: CPT | Performed by: EMERGENCY MEDICINE

## 2020-01-26 PROCEDURE — 93010 ELECTROCARDIOGRAM REPORT: CPT | Performed by: INTERNAL MEDICINE

## 2020-01-26 PROCEDURE — 99284 EMERGENCY DEPT VISIT MOD MDM: CPT

## 2020-01-26 PROCEDURE — 99283 EMERGENCY DEPT VISIT LOW MDM: CPT

## 2020-01-26 PROCEDURE — 85610 PROTHROMBIN TIME: CPT | Performed by: EMERGENCY MEDICINE

## 2020-01-26 PROCEDURE — 84484 ASSAY OF TROPONIN QUANT: CPT | Performed by: EMERGENCY MEDICINE

## 2020-01-26 PROCEDURE — 25010000002 ONDANSETRON PER 1 MG: Performed by: EMERGENCY MEDICINE

## 2020-01-26 PROCEDURE — 85025 COMPLETE CBC W/AUTO DIFF WBC: CPT | Performed by: EMERGENCY MEDICINE

## 2020-01-26 RX ORDER — MECLIZINE HYDROCHLORIDE 25 MG/1
25 TABLET ORAL ONCE
Status: COMPLETED | OUTPATIENT
Start: 2020-01-26 | End: 2020-01-26

## 2020-01-26 RX ORDER — ONDANSETRON 4 MG/1
4 TABLET, FILM COATED ORAL EVERY 6 HOURS PRN
Qty: 10 TABLET | Refills: 0 | Status: SHIPPED | OUTPATIENT
Start: 2020-01-26 | End: 2020-06-19

## 2020-01-26 RX ORDER — ONDANSETRON 2 MG/ML
4 INJECTION INTRAMUSCULAR; INTRAVENOUS ONCE
Status: COMPLETED | OUTPATIENT
Start: 2020-01-26 | End: 2020-01-26

## 2020-01-26 RX ORDER — MECLIZINE HYDROCHLORIDE 25 MG/1
25 TABLET ORAL 3 TIMES DAILY PRN
Qty: 12 TABLET | Refills: 0 | Status: SHIPPED | OUTPATIENT
Start: 2020-01-26 | End: 2020-06-19

## 2020-01-26 RX ADMIN — SODIUM CHLORIDE 500 ML: 9 INJECTION, SOLUTION INTRAVENOUS at 16:39

## 2020-01-26 RX ADMIN — ONDANSETRON 4 MG: 2 INJECTION INTRAMUSCULAR; INTRAVENOUS at 16:44

## 2020-01-26 RX ADMIN — MECLIZINE HYDROCHLORIDE 25 MG: 25 TABLET ORAL at 16:44

## 2020-01-26 NOTE — ED PROVIDER NOTES
" EMERGENCY DEPARTMENT ENCOUNTER    CHIEF COMPLAINT  Chief Complaint: Dizziness   History given by: Patient   History limited by: none   Room Number: 37/37  PMD: Mega Esparza MD      HPI:  Pt is a 84 y.o. female who presents complaining of intermittent \"room spinning\" dizziness that began at 0600 this morning upon waking up, improved with lying still and exacerbated with standing and movement. Per pt, she has had two similar events such as this 5-6 years ago, was evaluated at that time and dx with vertigo. Pt affirms she ambulates with a walker or cane at baseline.     Duration:  More than 10 hours   Onset: gradual   Timing: intermittent   Location: generalized   Radiation: none   Quality: dizziness   Intensity/Severity: mild to moderate   Progression: unchanged   Associated Symptoms: none   Aggravating Factors: movement and ambulation   Alleviating Factors: lying still   Previous Episodes: Pt affirms hx of vertigo.   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   • Leukopenia 09/12/2016   • MGUS (monoclonal gammopathy of unknown significance) 09/16/2016   • 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CMS/[facility]) 11/30/2017   • Lichen sclerosus 08/23/2017   • Heart block 10/30/2018   • Sleep-related hypoxia 12/22/2018   • Bradycardia 12/29/2018   • Shoulder arthritis 01/28/2019   • History of atrial fibrillation 03/19/2019   • Pacemaker 03/19/2019   • Paroxysmal SVT (supraventricular tachycardia) (CMS/HCC) 05/08/2019   • " History of chest pain 05/08/2019   • Palpitations 05/08/2019   • Atrial fibrillation, persistent 10/06/2019   • Rectal bleeding 10/15/2019   • Arthritis 12/13/2019     Resolved Ambulatory Problems     Diagnosis Date Noted   • Abnormal weight loss 05/12/2016   • Tachycardia 05/12/2016   • Nausea 05/12/2016   • Hyperthyroidism 05/12/2016   • Fatigue 05/12/2016   • Dizziness 05/12/2016   • Diarrhea 05/12/2016   • Abnormal thyroid stimulating hormone (TSH) level 05/12/2016   • Abdominal pain 05/12/2016   • Abnormal EKG 06/30/2016   • Precordial pain 12/24/2017     Past Medical History:   Diagnosis Date   • Anemia    • Arrhythmia    • Asthma    • Chest pain    • Colitis    • COPD (chronic obstructive pulmonary disease) (CMS/HCC)    • GERD (gastroesophageal reflux disease)    • Hypertension    • Hypertensive heart disease    • Kidney stone    • Low back pain    • Nephrolithiasis    • Obesity    • Paroxysmal atrial fibrillation (CMS/HCC)    • Peptic ulceration    • PSVT (paroxysmal supraventricular tachycardia) (CMS/HCC)    • Rectal bleed    • Sleep apnea    • Ventricular tachycardia (CMS/HCC)    • Vertigo        PAST SURGICAL HISTORY  Past Surgical History:   Procedure Laterality Date   • BACK SURGERY      lumbar fusion   • CARDIAC ELECTROPHYSIOLOGY PROCEDURE N/A 11/7/2018    Procedure: Pacemaker DC new   [place];  Surgeon: Jesus Granda MD;  Location: [facility] CATH INVASIVE LOCATION;  Service: Cardiology   • CHOLECYSTECTOMY     • COLONOSCOPY  06/16/2014    colitis, cryptitis,  tics, NBIH, TA w/low grade dysplasia   • COLONOSCOPY N/A 10/28/2019    Procedure: COLONOSCOPY WITH COLD AND HOT POLYPECTOMIES;  Surgeon: Micaela English MD;  Location: [facility] ENDOSCOPY;  Service: Gastroenterology   • HEMORRHOIDECTOMY     • HYSTERECTOMY     • KNEE ARTHROPLASTY     • MYOMECTOMY     • SHOULDER SURGERY     • SINUS SURGERY     • TOE NAIL AMPUTATION  03/04/2019   • TONSILLECTOMY         FAMILY HISTORY  Family History   Problem  "Relation Age of Onset   • Diabetes Mother    • Breast cancer Sister    • Kidney cancer Sister    • Heart disease Sister    • Prostate cancer Brother    • Prostate cancer Brother    • Prostate cancer Brother    • Malig Hyperthermia Neg Hx        SOCIAL HISTORY  Social History     Socioeconomic History   • Marital status:      Spouse name: Not on file   • Number of children: 10   • Years of education: High School   • Highest education level: Not on file   Occupational History   • Occupation: Caregiver     Employer: RETIRED     Comment: worked for [employer]   Tobacco Use   • Smoking status: Former Smoker     Packs/day: 1.50     Years: 10.00     Pack years: 15.00     Start date:      Last attempt to quit:      Years since quittin.1   • Smokeless tobacco: Never Used   • Tobacco comment: QUIT SMOKING    Substance and Sexual Activity   • Alcohol use: No     Comment: Daily caffeine use - one cup of coffee   • Drug use: Defer   • Sexual activity: Defer       ALLERGIES  Codeine; Amitriptyline; Amoxicillin-pot clavulanate; Aspirin; Bactrim [sulfamethoxazole-trimethoprim]; Carisoprodol-aspirin-codeine; Iodinated diagnostic agents; Latex; Naproxen; Nsaids; Soma compound with codeine [carisoprodol-aspirin-codeine]; Sulfa antibiotics; and Tramadol    REVIEW OF SYSTEMS  Review of Systems   Constitutional: Negative for fever.   HENT: Negative for sore throat.    Eyes: Negative.    Respiratory: Negative for cough and shortness of breath.    Cardiovascular: Negative for chest pain.   Gastrointestinal: Negative for abdominal pain, diarrhea and vomiting.   Genitourinary: Negative for dysuria.   Musculoskeletal: Negative for neck pain.   Skin: Negative for rash.   Allergic/Immunologic: Negative.    Neurological: Positive for dizziness (\"room spinning\"). Negative for weakness, numbness and headaches.   Hematological: Negative.    Psychiatric/Behavioral: Negative.    All other systems reviewed and " are negative.      PHYSICAL EXAM  ED Triage Vitals   Temp Heart Rate Resp BP SpO2   01/26/20 1549 01/26/20 1549 01/26/20 1549 01/26/20 1554 01/26/20 1549   97.7 °F (36.5 °C) 70 18 131/73 98 %      Temp src Heart Rate Source Patient Position BP Location FiO2 (%)   01/26/20 1549 01/26/20 1549 01/26/20 1554 01/26/20 1554 --   Tympanic Monitor Lying Right arm        Physical Exam   Constitutional: She is oriented to person, place, and time. No distress.   HENT:   Head: Normocephalic and atraumatic.   Mouth/Throat: Mucous membranes are normal.   Eyes: Pupils are equal, round, and reactive to light. EOM are normal.   Nystagmus on R brown gaze that does fatigue.    Neck: Normal range of motion. Neck supple.   Cardiovascular: Normal rate, regular rhythm and normal heart sounds.   Pulmonary/Chest: Effort normal and breath sounds normal. No respiratory distress.   Abdominal: Soft. Bowel sounds are normal. She exhibits no distension. There is no tenderness. There is no rebound and no guarding.   Musculoskeletal: Normal range of motion. She exhibits no edema (no pedal) or tenderness (no calf).   Neurological: She is alert and oriented to person, place, and time. She has normal sensation and normal strength. She has a normal Finger-Nose-Finger Test.   Non focal exam   Skin: Skin is warm and dry. No rash noted.   Psychiatric: Mood and affect normal.   Nursing note and vitals reviewed.      LAB RESULTS  Lab Results (last 24 hours)     Procedure Component Value Units Date/Time    CBC & Differential [[id number]] Collected:  01/26/20 1638    Specimen:  Blood Updated:  01/26/20 1728    Narrative:       The following orders were created for panel order CBC & Differential.  Procedure                               Abnormality         Status                     ---------                               -----------         ------                     CBC Auto Differential[[id number]]        Abnormal            Final result                 Please  view results for these tests on the individual orders.    Comprehensive Metabolic Panel [[id number]]  (Abnormal) Collected:  01/26/20 1638    Specimen:  Blood Updated:  01/26/20 1712     Glucose 81 mg/dL      BUN 19 mg/dL      Creatinine 0.73 mg/dL      Sodium 142 mmol/L      Potassium 3.4 mmol/L      Chloride 108 mmol/L      CO2 24.0 mmol/L      Calcium 8.4 mg/dL      Total Protein 5.5 g/dL      Albumin 3.10 g/dL      ALT (SGPT) 8 U/L      AST (SGOT) 15 U/L      Alkaline Phosphatase 53 U/L      Total Bilirubin 1.1 mg/dL      eGFR  African Amer 92 mL/min/1.73      Globulin 2.4 gm/dL      A/G Ratio 1.3 g/dL      BUN/Creatinine Ratio 26.0     Anion Gap 10.0 mmol/L     Narrative:       GFR Normal >60  Chronic Kidney Disease <60  Kidney Failure <15      Protime-INR [[id number]]  (Abnormal) Collected:  01/26/20 1638    Specimen:  Blood Updated:  01/26/20 1700     Protime 25.2 Seconds      INR 2.33    Troponin [[id number]]  (Normal) Collected:  01/26/20 1638    Specimen:  Blood Updated:  01/26/20 1709     Troponin T <0.010 ng/mL     Narrative:       Troponin T Reference Range:  <= 0.03 ng/mL-   Negative for AMI  >0.03 ng/mL-     Abnormal for myocardial necrosis.  Clinicians would have to utilize clinical acumen, EKG, Troponin and serial changes to determine if it is an Acute Myocardial Infarction or myocardial injury due to an underlying chronic condition.       Results may be falsely decreased if patient taking Biotin.      CBC Auto Differential [[id number]]  (Abnormal) Collected:  01/26/20 1638    Specimen:  Blood Updated:  01/26/20 1728     WBC 3.50 10*3/mm3      RBC 3.70 10*6/mm3      Hemoglobin 12.2 g/dL      Hematocrit 37.7 %      .9 fL      MCH 33.0 pg      MCHC 32.4 g/dL      RDW 12.8 %      RDW-SD 48.1 fl      MPV 9.2 fL      Platelets 129 10*3/mm3     Manual Differential [[id number]]  (Abnormal) Collected:  01/26/20 1638    Specimen:  Blood Updated:  01/26/20 1728     Neutrophil % 47.4 %      Lymphocyte % 49.5  %      Monocyte % 1.0 %      Atypical Lymphocyte % 2.1 %      Neutrophils Absolute 1.66 10*3/mm3      Lymphocytes Absolute 1.73 10*3/mm3      Monocytes Absolute 0.04 10*3/mm3      Anisocytosis Slight/1+     Macrocytes Slight/1+     WBC Morphology Normal     Platelet Morphology Normal          I ordered the above labs and reviewed the results    RADIOLOGY  CT Head Without Contrast   Final Result   No evidence for acute intracranial abnormality.       This report was finalized on 1/26/2020 5:46 PM by Dr. Natan Martínez M.D.               I ordered the above noted radiological studies. Interpreted by radiologist. Reviewed by me in PACS.       PROCEDURES  Procedures  EKG    EKG time: 1641  Rhythm/Rate: V-Paced   No Acute Ischemia  Non-Specific ST-T changes    Unchanged compared to prior on 7/19/19    Interpreted Contemporaneously by me.  Independently viewed by me      PROGRESS AND CONSULTS     1609: Upon pt exam, pt's vitals are stable, BP at 139/83 and HR in 70s. Discussed with pt the possibility symptoms are related to vertigo with plan for medication management in ED. Informed pt of plan for rule out of emergent findings with labs, CT head, and EKG as well.     1618: Labs ordered for further evaluation. CT head ordered due to dizziness. Antivert and Zofran ordered for symptom management.     1800: Pt rechecked and resting comfortably. Discussed with pt the negative acute findings of labs and CT head, with likelihood pt's symptoms are related to vertigo as pt's prior episodes several years ago. Informed pt of plan for discharge with medication for management of episodes, Antivert and Zofran, and plan for outpatient PCP follow up for recheck. Pt directed to rest at home and avoid operating heavy machinery for several days. Pt updated on INR level and informed that her medication would not interfere with anticoagulation. Pt understands and agrees with the plan, all questions answered.      MEDICAL DECISION  MAKING  Results were reviewed/discussed with the patient and they were also made aware of online access. Pt also made aware that some labs, such as cultures, will not be resulted during ER visit and follow up with PMD is necessary.     MDM  Number of Diagnoses or Management Options     Amount and/or Complexity of Data Reviewed  Clinical lab tests: ordered and reviewed (Unremarkable labs, INR - 2.3)  Tests in the radiology section of CPT®: ordered and reviewed (CT head - negative acute )  Tests in the medicine section of CPT®: ordered and reviewed (See note )           DIAGNOSIS  Final diagnoses:   Vertigo       DISPOSITION  DISCHARGE    Patient discharged in stable condition.    Reviewed implications of results, diagnosis, meds, responsibility to follow up, warning signs and symptoms of possible worsening, potential complications and reasons to return to ER.    Patient/Family voiced understanding of above instructions.    Discussed plan for discharge, as there is no emergent indication for admission. Patient referred to primary care provider for BP management due to today's BP. Pt/family is agreeable and understands need for follow up and repeat testing.  Pt is aware that discharge does not mean that nothing is wrong but it indicates no emergency is present that requires admission and they must continue care with follow-up as given below or physician of their choice.     FOLLOW-UP  Mega Esparza MD  [address]  [place] [id number] [phone]    In 3 days  If symptoms worsen         Medication List      New Prescriptions    meclizine 25 MG tablet  Commonly known as:  ANTIVERT  Take 1 tablet by mouth 3 (Three) Times a Day As Needed for Dizziness.     ondansetron 4 MG tablet  Commonly known as:  ZOFRAN  Take 1 tablet by mouth Every 6 (Six) Hours As Needed for Nausea or   Vomiting.              Latest Documented Vital Signs:  As of 6:12 PM  BP- 136/70 HR- 70 Temp- 97.7 °F (36.5 °C) (Tympanic) O2  sat- 96%    --  Documentation assistance provided by nuris Knott for Dr. Torres.  Information recorded by the scribe was done at my direction and has been verified and validated by me.                 Jessenia Knott  01/26/20 1812       Natan Torres MD  01/26/20 2034

## 2020-01-26 NOTE — ED TRIAGE NOTES
Pt reports she feeling dizzy since this morning. Pt reports nothing makes it worse or better. Pt reprots she feels like the room is spinning. Pt denies pain.

## 2020-01-26 NOTE — ED NOTES
Bedside dysphasia performed before PO medication administration.      Kevin Lechuga, CORRINE  01/26/20 0092

## 2020-01-27 RX ORDER — GABAPENTIN 300 MG/1
CAPSULE ORAL
Qty: 360 CAPSULE | Refills: 1 | Status: SHIPPED | OUTPATIENT
Start: 2020-01-27 | End: 2020-06-19

## 2020-02-04 ENCOUNTER — ANTICOAGULATION VISIT (OUTPATIENT)
Dept: PHARMACY | Facility: HOSPITAL | Age: 85
End: 2020-02-04

## 2020-02-04 DIAGNOSIS — I48.19 ATRIAL FIBRILLATION, PERSISTENT (HCC): ICD-10-CM

## 2020-02-04 LAB
INR PPP: 2.9 (ref 0.91–1.09)
PROTHROMBIN TIME: 34.9 SECONDS (ref 10–13.8)

## 2020-02-04 PROCEDURE — 85610 PROTHROMBIN TIME: CPT

## 2020-02-04 PROCEDURE — 36416 COLLJ CAPILLARY BLOOD SPEC: CPT

## 2020-02-04 RX ORDER — WARFARIN SODIUM 5 MG/1
TABLET ORAL
Qty: 85 TABLET | Refills: 0 | Status: SHIPPED | OUTPATIENT
Start: 2020-02-04 | End: 2020-04-07

## 2020-02-04 NOTE — PROGRESS NOTES
Anticoagulation Clinic Progress Note    Anticoagulation Summary  As of 2020    INR goal:   2.0-3.0   TTR:   71.7 % (1.3 y)   INR used for dosin.9 (2020)   Warfarin maintenance plan:   2.5 mg every Sat; 5 mg all other days   Weekly warfarin total:   32.5 mg   No change documented:   Cherelle Montague   Plan last modified:   [facility]ust[facility] (2019)   Next INR check:   3/9/2020   Priority:   Maintenance   Target end date:   Indefinite    Indications    Atrial fibrillation  persistent [I48.19]             Anticoagulation Episode Summary     INR check location:       Preferred lab:       Send IN[facility] reminders [facility]:    [facility]    Comments:         Anticoagulation Care Providers     Provider Role Specialty Phone number    Mary Grace Culp MD Referring Cardiology [phone]          Clinic Interview:      INR History:  Anticoagulation Monitoring 2019   INR 2.8 2.8 2.9   INR Date 2019   INR Goal 2.0-3.0 2.0-3.0 2.0-3.0   Trend Same Same Same   Last Week Total 32.5 mg 32.5 mg 32.5 mg   Next Week Total 32.5 mg 32.5 mg 32.5 mg   Sun 5 mg 5 mg 5 mg   Mon 5 mg 5 mg 5 mg   Tue 5 mg 5 mg 5 mg   Wed 5 mg 5 mg 5 mg   Thu 5 mg 5 mg 5 mg   Fri 5 mg 5 mg 5 mg   Sat 2.5 mg 2.5 mg 2.5 mg   Visit Report - - -   Some recent data might be hidden       Plan:  1. INR is therapeutic today- see above in Anticoagulation Summary.   Will instruct Brittany Villeda to continue their warfarin regimen- see above in Anticoagulation Summary.  2. Follow up in 5 weeks.  3. Patient declines warfarin refills.  4. Verbal and written information provided. Patient expresses understanding and has no further questions at this time.    Cherelle Montague

## 2020-02-05 ENCOUNTER — APPOINTMENT (OUTPATIENT)
Dept: OTHER | Facility: HOSPITAL | Age: 85
End: 2020-02-05

## 2020-02-05 ENCOUNTER — APPOINTMENT (OUTPATIENT)
Dept: LAB | Facility: HOSPITAL | Age: 85
End: 2020-02-05

## 2020-02-05 ENCOUNTER — OFFICE VISIT (OUTPATIENT)
Dept: ONCOLOGY | Facility: CLINIC | Age: 85
End: 2020-02-05

## 2020-02-05 VITALS
TEMPERATURE: 97.4 F | RESPIRATION RATE: 16 BRPM | WEIGHT: 218 LBS | HEART RATE: 70 BPM | SYSTOLIC BLOOD PRESSURE: 122 MMHG | DIASTOLIC BLOOD PRESSURE: 75 MMHG | BODY MASS INDEX: 37.22 KG/M2 | HEIGHT: 64 IN | OXYGEN SATURATION: 97 %

## 2020-02-05 DIAGNOSIS — D70.8 OTHER NEUTROPENIA (HCC): ICD-10-CM

## 2020-02-05 DIAGNOSIS — D47.2 MGUS (MONOCLONAL GAMMOPATHY OF UNKNOWN SIGNIFICANCE): Primary | ICD-10-CM

## 2020-02-05 PROCEDURE — 99214 OFFICE O/P EST MOD 30 MIN: CPT | Performed by: INTERNAL MEDICINE

## 2020-02-05 NOTE — PROGRESS NOTES
Subjective     REASON FOR FOLLOW UP:   1. CHRONIC LEUKOPENIA/ Neutropenia  2. IgA Lambda MGUS  3. Ulcerative Colitis  4. Positive ALEC    HISTORY OF PRESENT ILLNESS:  The patient is a 84 y.o. year old female who is here for follow up of the above noted  issuse.  She had actually been evaluated by Dr. Choi in our practice in 2014 for leukopenia.  At that time Dr. Choi had sent a peripheral blood flow cytometry which did not show any abnormal populations of white cells.      She is here today for routine six-month follow-up and monitoring of her blood counts and monoclonal gammopathy..    She had a repeat serum protein electrophoresis performed 1/3/2020 that showed stable IgA monoclonal spike.  Her white count remains low but stable and overall she appears to be doing very well.    She did have an ER visit last week for an episode of vertigo and was discharged with a prescription for meclizine.    History of Present Illness     ALLERGIES:    Allergies   Allergen Reactions   • Codeine Hallucinations   • Amitriptyline Rash   • Amoxicillin-Pot Clavulanate Rash   • Aspirin Unknown - Low Severity     Patient doesn't know why   • Bactrim [Sulfamethoxazole-Trimethoprim] Rash   • Carisoprodol-Aspirin-Codeine Palpitations   • Iodinated Diagnostic Agents Rash   • Latex Rash   • Naproxen Rash   • Nsaids Unknown - Low Severity     unkknown   • Soma Compound With Codeine [Carisoprodol-Aspirin-Codeine] Rash   • Sulfa Antibiotics Rash   • Tramadol Palpitations     heart races           Review of Systems   Constitutional: Negative for activity change, appetite change, fatigue, fever and unexpected weight change.   HENT: Negative for hearing loss, nosebleeds, trouble swallowing and voice change.    Eyes: Negative for visual disturbance.   Respiratory: Negative for cough, shortness of breath and wheezing.    Cardiovascular: Negative for chest pain and palpitations.   Gastrointestinal: Negative for abdominal pain, nausea and vomiting.  "  Genitourinary: Negative for difficulty urinating, frequency, hematuria and urgency.   Musculoskeletal: Positive for arthralgias and gait problem. Negative for back pain and neck pain.   Skin: Negative for rash.   Neurological: Negative for dizziness, seizures, syncope and headaches.        Balance issues.   Hematological: Negative for adenopathy. Does not bruise/bleed easily.   Psychiatric/Behavioral: Negative for behavioral problems. The patient is not nervous/anxious.       Objective     Vitals:    02/05/20 0904   BP: 122/75   Pulse: 70   Resp: 16   Temp: 97.4 °F (36.3 °C)   TempSrc: Oral   SpO2: 97%   Weight: 98.9 kg (218 lb)   Height: 162.6 cm (64.02\")   PainSc: 0-No pain     Current Status 2/5/2020   ECOG score 1       Physical Exam   Constitutional: She is oriented to person, place, and time. She appears well-developed and well-nourished. No distress.   HENT:   Head: Normocephalic.   Eyes: Pupils are equal, round, and reactive to light. Conjunctivae and EOM are normal. No scleral icterus.   Neck: Normal range of motion. Neck supple. No JVD present. No thyromegaly present.   Cardiovascular: Normal rate and regular rhythm. Exam reveals no gallop and no friction rub.   Murmur heard.  Pulmonary/Chest: Effort normal and breath sounds normal. She has no wheezes. She has no rales.   Abdominal: Soft. She exhibits no distension and no mass. There is no tenderness.   Musculoskeletal: Normal range of motion. She exhibits edema. She exhibits no deformity.   Lymphadenopathy:     She has no cervical adenopathy.   Neurological: She is alert and oriented to person, place, and time. She has normal reflexes. No cranial nerve deficit.   Ambulates with a cane.   Skin: Skin is warm and dry. No rash noted. No erythema.   Psychiatric: She has a normal mood and affect. Her behavior is normal. Judgment normal.         RECENT LABS:  Hematology WBC   Date Value Ref Range Status   01/26/2020 3.50 3.40 - 10.80 10*3/mm3 Final "   09/27/2019 2.97 (L) 3.40 - 10.80 10*3/mm3 Final     RBC   Date Value Ref Range Status   01/26/2020 3.70 (L) 3.77 - 5.28 10*6/mm3 Final   09/27/2019 3.95 3.77 - 5.28 10*6/mm3 Final     Hemoglobin   Date Value Ref Range Status   01/26/2020 12.2 12.0 - 15.9 g/dL Final     Hematocrit   Date Value Ref Range Status   01/26/2020 37.7 34.0 - 46.6 % Final     Platelets   Date Value Ref Range Status   01/26/2020 129 (L) 140 - 450 10*3/mm3 Final        Lab Results   Component Value Date    GLUCOSE 81 01/26/2020    BUN 19 01/26/2020    CREATININE 0.73 01/26/2020    EGFRIFNONA 60 (L) 09/27/2019    EGFRIFAFRI 92 01/26/2020    BCR 26.0 (H) 01/26/2020    K 3.4 (L) 01/26/2020    CO2 24.0 01/26/2020    CALCIUM 8.4 (L) 01/26/2020    PROTENTOTREF 7.4 01/03/2020    ALBUMIN 3.10 (L) 01/26/2020    LABIL2 0.9 01/03/2020    AST 15 01/26/2020    ALT 8 01/26/2020       Lab Results   Component Value Date    IRON 71 01/03/2020    TIBC 372 01/03/2020    FERRITIN 80.00 01/03/2020       SPEP/DMITRIY 1/3/2020  IgG  700 - 1600 mg/dL 1471    1161     IgA  64 - 422 mg/dL 828High     709High      Comment: Results confirmed on   dilution.   IgM  26 - 217 mg/dL 68    58     Total Protein  6.0 - 8.5 g/dL 7.4  7.7 R 6.9  6.8  7.3 R   Albumin  2.9 - 4.4 g/dL 3.4  4.10 R 3.70 R 3.5  4.00 R   Alpha-1-Globulin  0.0 - 0.4 g/dL 0.3    0.2     Alpha-2-Globulin  0.4 - 1.0 g/dL 0.7    0.6     Beta Globulin  0.7 - 1.3 g/dL 1.6High     1.3     Gamma Globulin  0.4 - 1.8 g/dL 1.4    1.2     M-Kip  Not Observed g/dL Comment:    0.4High      Comment: Due to the small quantity of monoclonal protein, unable to   quantitate the M-spike.   Globulin  2.2 - 3.9 g/dL 4.0High     3.3     A/G Ratio  0.7 - 1.7 0.9  1.1 R 1.2 R 1.1  1.2 R   Immunofixation Reflex, Serum Comment    Comment CM    Comment: Immunofixation shows IgA monoclonal protein with lambda light chain   specificity.     Free Light Chain, Kappa  3.3 - 19.4 mg/L 37.0High     40.2High      Free Lambda Light  Chains  5.7 - 26.3 mg/L 38.2High     40.4High      Kappa/Lambda Ratio  0.26 - 1.65 0.97    1.00           BONE MARROW BIOPSY 9/13/2017  No evidence malignancy or plasma cell excess. Absent stainable iron.    Assessment/Plan   1.  Chronic leukopenia/neutropenia without any recurring infections.  As noted above she been evaluated in our office in 2014 and at that time peripheral blood flow cytometry was normal.  I suspect this may be a combination of benign constitutional leukopenia, which is common among the  population with some contribution or worsening of leukopenia due to her Lialda for ulcerative colitis or possible autoimmune leukopenia.  Her counts remain low but stable. Bone marrow exam on 9/13/2017 did not show any evidence of marrow pathology other than absent stainable iron.  2.  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  Her IgA level and lambda light chains have been stable over the past 6 months.  With her bone marrow showing no excess of plasma cells we will just plan on observing this for now with repeat serum protein studies every 6 months.   3.  Ulcerative colitis.  She continues to follow-up with her gastroenterologist Dr. English  4.  Positive ALEC on previous lab work.  It is possible she could also have some degree of autoimmune neutropenia.      Plan    1.  We discussed the results of the lab studies with the patient and  reassured her that monoclonal gammopathy and leukopenia are stable.  2.  She'll be scheduled for return visit in 6 months and will undergo repeat labs one week prior to that visit including CBC, serum chemistries, iron studies, and serum protein electrophoresis with immunofixation.

## 2020-02-13 NOTE — PROGRESS NOTES
Subjective     REASON FOR FOLLOW UP:   1. CHRONIC LEUKOPENIA/ Neutropenia  2. IgA Lambda MGUS  3. Ulcerative Colitis  4. Positive ALEC    HISTORY OF PRESENT ILLNESS:  The patient is a 82 y.o. year old female who is here for follow up of the above noted  issuse.  She had actually been evaluated by Dr. Choi in our practice in 2014 for leukopenia.  At that time Dr. Choi had sent a peripheral blood flow cytometry which did not show any abnormal populations of white cells.  Ms. Villeda reports that she is actually feeling well in general.  She's not had any recurring infections.  Her white count remains low but stable.  Her platelets and hemoglobin are unremarkable.    With her visit here 6 months ago, we performed additional lab studies that revealed an IgA lambda monoclonal gammopathy.  She had repeat serum protein studies performed last week on 8/30/2017 that showed her IgA level and monoclonal spike had increased slightly.  Her white blood count remained low with an absolute neutrophil count of 820.  Her serum calcium level was also borderline elevated at 10.4 with a serum creatinine of 0.88.    We scheduled her for an outpatient bone marrow biopsy that was performed on 9/13/2017.  She returns today to discuss those results.  Thankfully the bone marrow biopsy did not show any evidence of plasma cell dyscrasia or other malignancy or myelodysplasia.  She had absent iron on the iron stain.    She also was noted to have a positive ALEC and positive RNP antibodies.    History of Present Illness       Current Outpatient Prescriptions on File Prior to Visit   Medication Sig Dispense Refill   • Acetaminophen (PAIN RELIEVER PO) Take  by mouth.     • calcium carbonate (OS-ALIDA) 600 MG tablet Take  by mouth daily.     • furosemide (LASIX) 20 MG tablet TAKE 2 TABLETS IN THE MORNING  AND TAKE 1 TABLET AT NIGHT 270 tablet 3   • gabapentin (NEURONTIN) 300 MG capsule Take 1 capsule by mouth 3 (Three) Times a Day. 270 capsule 0   •  hyperglycemia mesalamine (LIALDA) 1.2 G EC tablet Take 4 tablets daily 360 tablet 1   • mometasone (ELOCON) 0.1 % ointment Apply  topically 2 (Two) Times a Day. Bid x1 month, till oct 23      Every other day, for once per day     • omeprazole (priLOSEC) 20 MG capsule Take 1 capsule by mouth Daily. 90 capsule 3   • vitamin B-12 (CYANOCOBALAMIN) 1000 MCG tablet Take  by mouth daily.     • warfarin (COUMADIN) 5 MG tablet Take 1 tablet by mouth Daily. (Patient taking differently: Take 5 mg by mouth Daily. Last taken 9/9/17) 104 tablet 3     No current facility-administered medications on file prior to visit.         ALLERGIES:    Allergies   Allergen Reactions   • Amitriptyline    • Amoxicillin-Pot Clavulanate    • Aspirin    • Bactrim [Sulfamethoxazole-Trimethoprim]    • Iodinated Diagnostic Agents    • Latex    • Naproxen    • Nsaids    • Soma Compound With Codeine [Carisoprodol-Aspirin-Codeine]    • Sulfa Antibiotics           Review of Systems   Constitutional: Negative for activity change, appetite change, fatigue, fever and unexpected weight change.   HENT: Negative for hearing loss, nosebleeds, trouble swallowing and voice change.    Eyes: Negative for visual disturbance.   Respiratory: Negative for cough, shortness of breath and wheezing.    Cardiovascular: Negative for chest pain and palpitations.   Gastrointestinal: Negative for abdominal pain, nausea and vomiting.   Genitourinary: Negative for difficulty urinating, frequency, hematuria and urgency.   Musculoskeletal: Positive for arthralgias and gait problem. Negative for back pain and neck pain.   Skin: Negative for rash.   Neurological: Negative for dizziness, seizures, syncope and headaches.        Balance issues.   Hematological: Negative for adenopathy. Does not bruise/bleed easily.   Psychiatric/Behavioral: Negative for behavioral problems. The patient is not nervous/anxious.       Objective     Vitals:    09/25/17 0810   BP: 122/69   Pulse: 69   Resp: 16   Temp:  "98.4 °F (36.9 °C)   TempSrc: Oral   SpO2: 98%   Weight: 243 lb (110 kg)   Height: 64.96\" (165 cm)   PainSc:   9   PainLoc: Generalized  Comment: with no pain medication     Current Status 9/25/2017   ECOG score 1       Physical Exam   Constitutional: She is oriented to person, place, and time. She appears well-developed and well-nourished. No distress.   HENT:   Head: Normocephalic.   Eyes: Conjunctivae and EOM are normal. Pupils are equal, round, and reactive to light. No scleral icterus.   Neck: Normal range of motion. Neck supple. No JVD present. No thyromegaly present.   Cardiovascular: Normal rate and regular rhythm.  Exam reveals no gallop and no friction rub.    Murmur heard.  Pulmonary/Chest: Effort normal and breath sounds normal. She has no wheezes. She has no rales.   Abdominal: Soft. She exhibits no distension and no mass. There is no tenderness.   Musculoskeletal: Normal range of motion. She exhibits no edema or deformity.   Lymphadenopathy:     She has no cervical adenopathy.   Neurological: She is alert and oriented to person, place, and time. She has normal reflexes. No cranial nerve deficit.   Ambulates with a cane.   Skin: Skin is warm and dry. No rash noted. No erythema.   Psychiatric: She has a normal mood and affect. Her behavior is normal. Judgment normal.         RECENT LABS:  Hematology WBC   Date Value Ref Range Status   08/30/2017 2.94 (L) 4.00 - 10.00 10*3/mm3 Final     RBC   Date Value Ref Range Status   08/30/2017 3.87 (L) 3.90 - 5.00 10*6/mm3 Final     Hemoglobin   Date Value Ref Range Status   08/30/2017 12.3 11.5 - 14.9 g/dL Final     Hematocrit   Date Value Ref Range Status   08/30/2017 38.2 34.0 - 45.0 % Final     Platelets   Date Value Ref Range Status   08/30/2017 167 150 - 375 10*3/mm3 Final        Lab Results   Component Value Date    GLUCOSE 96 08/30/2017    BUN 16 08/30/2017    CREATININE 0.88 08/30/2017    EGFRIFNONA  09/12/2016      Comment:      <15 Indicative of kidney " failure.    EGFRIFAFRI 75 08/30/2017    BCR 18.2 08/30/2017    K 4.0 08/30/2017    CO2 27.5 08/30/2017    CALCIUM 10.4 (H) 08/30/2017    PROTENTOTREF 7.5 08/30/2017    ALBUMIN 3.90 08/30/2017    ALBUMIN 3.4 08/30/2017    LABIL2 1.0 (L) 08/30/2017    LABIL2 0.9 08/30/2017    AST 18 08/30/2017    ALT 9 08/30/2017     Lab Results   Component Value Date    YXUKJWEA34 1174 (H) 08/30/2017 8/30/2017  IgG 700 - 1600 mg/dL 1331   IgA 64 - 422 mg/dL 788 (H)   IgM 26 - 217 mg/dL 67   Total Protein 6.0 - 8.5 g/dL 7.5   Albumin 2.9 - 4.4 g/dL 3.4   Alpha-1-Globulin 0.0 - 0.4 g/dL 0.3   Alpha-2-Globulin 0.4 - 1.0 g/dL 0.7   Beta Globulin 0.7 - 1.3 g/dL 1.6 (H)   Gamma Globulin 0.4 - 1.8 g/dL 1.5   M-Kip Not Observed g/dL Comment:   Comments: Due to the small quantity of monoclonal protein, unable to   quantitate the M-spike.   Globulin 2.2 - 3.9 g/dL 4.1 (H)   A/G Ratio 0.7 - 1.7 0.9   Immunofixation Reflex, Serum  Comment   Comments: Immunofixation shows IgA monoclonal protein with lambda light chain      Free Light Chain, Kappa 3.3 - 19.4 mg/L 42.6 (H)   Free Lambda Light Chains 5.7 - 26.3 mg/L 50.3 (H)   Kappa/Lambda Ratio 0.26 - 1.65 0.85     BONE MARROW BIOPSY 9/13/2017  No evidence malignancy or plasma cell excess. Absent stainable iron.    Assessment/Plan   1.  Chronic leukopenia/neutropenia without any recurring infections.  As noted above she been evaluated in our office in 2014 and at that time peripheral blood flow cytometry was normal.  I suspect this may be a combination of benign constitutional leukopenia, which is common among the  population with some contribution or worsening of leukopenia due to her Lialda for ulcerative colitis or possible autoimmune leukopenia.  Her counts remain low but stable. Bone marrow exam on 9/13/2017 did not show any evidence of marrow pathology other than absent stainable iron.  2.  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  Her IgA level and  lambda light chains have increased slightly over the past 6 months.  With her bone marrow showing no excess of plasma cells we will just plan on observing this for now with repeat serum protein studies every 4-6 months.   3.  Ulcerative colitis.  She continues to follow-up with her gastroenterologist Dr. English  4.  Positive ALEC on previous lab work.  It is possible she could also have some degree of autoimmune neutropenia.  5.  Absent iron on the bone marrow examination.  We suspect patient is becoming iron deficient due to chronic GI blood loss from ulcerative colitis and chronic Coumadin therapy.    Plan    1.  We discussed the results of the bone marrow with the patient and her daughter today and reassured them that we do not see any evidence of marrow pathology and certainly no evidence of myeloma or other plasma cell dyscrasia.  Likewise there is no evidence of myelodysplasia or malignancy to explain her chronic leukopenia.  2.  We will draw some additional lab studies today for an iron profile and ferritin due to the absent iron on her bone marrow exam.  3.  We we'll go ahead and start the patient on some oral iron supplementation with ferrous sulfate 325 mg by mouth twice daily with food.  4.  We will schedule follow-up M.D. appointment in our office in 4 months with repeat labs drawn 1 week prior to that visit to include a CBC iron profile, ferritin, serum chemistries, and serum protein electrophoresis and immunofixation.  5.  She will be following up with Dr. English her gastroenterologist in November.  We will defer to her discretion as to whether or not she feels Ms. Villeda would need to undergo another colonoscopy.  6.  We encouraged her to discuss with her primary care physician her diffuse arthralgias.  Certainly possible that she could have an underlying autoimmune connective tissue disorder contributing to her symptoms and may need to see a rheumatologist at some point.

## 2020-03-02 ENCOUNTER — TELEPHONE (OUTPATIENT)
Dept: GASTROENTEROLOGY | Facility: CLINIC | Age: 85
End: 2020-03-02

## 2020-03-02 NOTE — TELEPHONE ENCOUNTER
----- Message from Fabiana Kim sent at 3/2/2020  8:36 AM EST -----  Regarding: medication  Contact: [phone]  Pt is calling regarding mesalamine (APRISO) 0.375 g 24 hr capsule prescription and refill     Pharmacy:  [facility] - [facility] [address] - [phone]  - [phone] FX Phone:  [phone] Fax:  [phone]   [address] [facility] [address]

## 2020-03-02 NOTE — TELEPHONE ENCOUNTER
Call to pt.  States on 2/24, received mesalamine 0.375 g - take 4 tabs po daily, #120 (1 mo supply).      Concerned because bottle states mesalamine (does not include apriso), and then only 1 mo supply.    Call to [facility] and spoke with Dorothea.  Pt was given 1 mo supply because  Apriso now no longer formulary.  Requires PA.  Transferred to  and spoke with Jelani.  Clinical info given.  Decision will be faxed to  within 24-48 hours.  Can also call  to check status.  Reference # for PA is [id number].

## 2020-03-03 NOTE — TELEPHONE ENCOUNTER
Catherine Almodovar MA  [facility] Clinical 1 Pool 18 minutes ago (12:46 PM)      PA for Mesalamine approved 3/3/2020-12/31/2020   To be scanned into Media tab    Routing comment      Call to [employer] and spoke with Lucila.  Advise of above.  States 90 day refill will be shipped to pt on 3/16.  Call to pt.  Advise of same.   Verb understanding.  Kesha Ahn RN.

## 2020-03-09 ENCOUNTER — ANTICOAGULATION VISIT (OUTPATIENT)
Dept: PHARMACY | Facility: HOSPITAL | Age: 85
End: 2020-03-09

## 2020-03-09 DIAGNOSIS — I48.19 ATRIAL FIBRILLATION, PERSISTENT (HCC): ICD-10-CM

## 2020-03-09 LAB
INR PPP: 2.6 (ref 0.91–1.09)
PROTHROMBIN TIME: 31.7 SECONDS (ref 10–13.8)

## 2020-03-09 PROCEDURE — 36416 COLLJ CAPILLARY BLOOD SPEC: CPT

## 2020-03-09 PROCEDURE — 85610 PROTHROMBIN TIME: CPT

## 2020-03-09 RX ORDER — MESALAMINE 0.38 G/1
CAPSULE, EXTENDED RELEASE ORAL
Qty: 360 CAPSULE | Refills: 1 | Status: SHIPPED | OUTPATIENT
Start: 2020-03-09 | End: 2020-07-30

## 2020-03-09 NOTE — TELEPHONE ENCOUNTER
Liz request received for mesalamine 0.375 g - take 4 caps by mouth daily, #360, R1.     See note of 11/11/19- pt was to f/u in Jan.  No appt noted.    Message to Dr English.

## 2020-03-09 NOTE — PROGRESS NOTES
Anticoagulation Clinic Progress Note    Anticoagulation Summary  As of 3/9/2020    INR goal:   2.0-3.0   TTR:   73.6 % (1.4 y)   INR used for dosin.6 (3/9/2020)   Warfarin maintenance plan:   2.5 mg every Sat; 5 mg all other days   Weekly warfarin total:   32.5 mg   No change documented:   Cherelle Montague   Plan last modified:   Rusty Interiano [facility] (2019)   Next INR check:   2020   Priority:   Maintenance   Target end date:   Indefinite    Indications    Atrial fibrillation  persistent [I48.19]             Anticoagulation Episode Summary     INR check location:       Preferred lab:       [facility]    Comments:         Anticoagulation Care Providers     Provider Role Specialty Phone number    Mary Grace Culp MD Referring Cardiology [phone]          Clinic Interview:      INR History:  Anticoagulation Monitoring 2019 2020 3/9/2020   INR 2.8 2.9 2.6   INR Date 2019 2020 3/9/2020   INR Goal 2.0-3.0 2.0-3.0 2.0-3.0   Trend Same Same Same   Last Week Total 32.5 mg 32.5 mg 32.5 mg   Next Week Total 32.5 mg 32.5 mg 32.5 mg   Sun 5 mg 5 mg 5 mg   Mon 5 mg 5 mg 5 mg   Tue 5 mg 5 mg 5 mg   Wed 5 mg 5 mg 5 mg   Thu 5 mg 5 mg 5 mg   Fri 5 mg 5 mg 5 mg   Sat 2.5 mg 2.5 mg 2.5 mg   Visit Report - - -   Some recent data might be hidden       Plan:  1. INR is therapeutic today- see above in Anticoagulation Summary.   Will instruct Brittany Villeda to continue their warfarin regimen- see above in Anticoagulation Summary.  2. Follow up in 6 weeks.  3. Patient declines warfarin refills.  4. Verbal and written information provided. Patient expresses understanding and has no further questions at this time.    Cherelle Montague

## 2020-03-12 RX ORDER — FUROSEMIDE 20 MG/1
TABLET ORAL
Qty: 270 TABLET | Refills: 1 | Status: SHIPPED | OUTPATIENT
Start: 2020-03-12 | End: 2020-07-27

## 2020-03-12 NOTE — TELEPHONE ENCOUNTER
Please call patient and confirm the furosemide dosage.  Find out who originally prescribed it.  I know it was not me.  It seems like potentially a lot.

## 2020-03-12 NOTE — TELEPHONE ENCOUNTER
It looks like we have filled this medication a few times. She said that is how she takes the medication I seen that it goes all the way back til 2017 with Dr. Castaneda. I asked her who started her on this dose She said that she thinks it wass Dr. Haji

## 2020-03-13 ENCOUNTER — CLINICAL SUPPORT (OUTPATIENT)
Dept: ORTHOPEDIC SURGERY | Facility: CLINIC | Age: 85
End: 2020-03-13

## 2020-03-13 VITALS — TEMPERATURE: 97.9 F | WEIGHT: 212.1 LBS | HEIGHT: 64 IN | BODY MASS INDEX: 36.21 KG/M2

## 2020-03-13 DIAGNOSIS — M19.012 PRIMARY LOCALIZED OSTEOARTHROSIS OF LEFT SHOULDER REGION: Primary | ICD-10-CM

## 2020-03-13 PROCEDURE — 73030 X-RAY EXAM OF SHOULDER: CPT | Performed by: ORTHOPAEDIC SURGERY

## 2020-03-13 PROCEDURE — 20610 DRAIN/INJ JOINT/BURSA W/O US: CPT | Performed by: ORTHOPAEDIC SURGERY

## 2020-03-13 RX ORDER — LIDOCAINE HYDROCHLORIDE 20 MG/ML
4 INJECTION, SOLUTION EPIDURAL; INFILTRATION; INTRACAUDAL; PERINEURAL
Status: COMPLETED | OUTPATIENT
Start: 2020-03-13 | End: 2020-03-13

## 2020-03-13 RX ORDER — METHYLPREDNISOLONE ACETATE 80 MG/ML
80 INJECTION, SUSPENSION INTRA-ARTICULAR; INTRALESIONAL; INTRAMUSCULAR; SOFT TISSUE
Status: COMPLETED | OUTPATIENT
Start: 2020-03-13 | End: 2020-03-13

## 2020-03-13 RX ADMIN — LIDOCAINE HYDROCHLORIDE 4 ML: 20 INJECTION, SOLUTION EPIDURAL; INFILTRATION; INTRACAUDAL; PERINEURAL at 08:21

## 2020-03-13 RX ADMIN — METHYLPREDNISOLONE ACETATE 80 MG: 80 INJECTION, SUSPENSION INTRA-ARTICULAR; INTRALESIONAL; INTRAMUSCULAR; SOFT TISSUE at 08:21

## 2020-03-13 NOTE — PROGRESS NOTES
Patient: Brittany Villeda  YOB: 1935  Date of Service: 3/13/2020    Chief Complaints:   Chief Complaint   Patient presents with   • Right Shoulder - Follow-up, Pain   • Left Shoulder - Follow-up, Pain   Bilateral shoulder pain    Subjective:    History of Present Illness: Pt is seen in the office today with complaints of Chief Complaint   Patient presents with   • Right Shoulder - Follow-up, Pain   • Left Shoulder - Follow-up, Pain   .    Patient is here followed bilateral shoulder pain she has known degenerative changes we have x-rayed the right one more recently the left has been a while ago she does have degenerative changes she gets intermittent injections and does well      Allergies:   Allergies   Allergen Reactions   • Codeine Hallucinations   • Amitriptyline Rash   • Amoxicillin-Pot Clavulanate Rash   • Aspirin Unknown - Low Severity     Patient doesn't know why   • Bactrim [Sulfamethoxazole-Trimethoprim] Rash   • Carisoprodol-Aspirin-Codeine Palpitations   • Iodinated Diagnostic Agents Rash   • Latex Rash   • Naproxen Rash   • Nsaids Unknown - Low Severity     unkknown   • Soma Compound With Codeine [Carisoprodol-Aspirin-Codeine] Rash   • Sulfa Antibiotics Rash   • Tramadol Palpitations     heart races        Medications:   Home Medications:  Current Outpatient Medications on File Prior to Visit   Medication Sig   • Acetaminophen (PAIN RELIEVER PO) Take 1 tablet by mouth As Needed.   • albuterol sulfate  (90 Base) MCG/ACT inhaler Inhale 2 puffs Every 6 (Six) Hours As Needed for Wheezing.   • calcium carbonate-cholecalciferol 500-400 MG-UNIT tablet tablet Take 1 tablet by mouth Daily.   • furosemide (LASIX) 20 MG tablet TAKE 2 TABLETS IN THE MORNING  AND TAKE 1 TABLET EVERY EVENING   • gabapentin (NEURONTIN) 300 MG capsule TAKE 1 CAPSULE FOUR TIMES DAILY   • HYDROcodone-acetaminophen (NORCO) 5-325 MG per tablet 1/2-1 tab po bid prn severe L shoulder pain. Do not take with tylenol.    • meclizine (ANTIVERT) 25 MG tablet Take 1 tablet by mouth 3 (Three) Times a Day As Needed for Dizziness.   • mesalamine (APRISO) 0.375 g 24 hr capsule TAKE 4 CAPSULES DAILY   • omeprazole (priLOSEC) 20 MG capsule TAKE 1 CAPSULE EVERY DAY   • ondansetron (ZOFRAN) 4 MG tablet Take 1 tablet by mouth Every 6 (Six) Hours As Needed for Nausea or Vomiting.   • vitamin B-12 (CYANOCOBALAMIN) 1000 MCG tablet Take 1,000 mcg by mouth Daily.   • warfarin (COUMADIN) 5 MG tablet Take one-half tablet (2.5 mg) by mouth on Sat, and take one tablet (5 mg) by mouth all other days or as directed.     No current facility-administered medications on file prior to visit.      Current Medications:  Scheduled Meds:  Continuous Infusions:  No current facility-administered medications for this visit.   PRN Meds:.    I have reviewed the patient's medical history in detail and updated the computerized patient record.  Review and summarization of old records include:    Past Medical History:   Diagnosis Date   • Anemia    • Arrhythmia    • Arthritis    • Asthma    • Bradycardia    • Chest pain    • Colitis    • COPD (chronic obstructive pulmonary disease) (CMS/HCC)    • Diastolic dysfunction    • Essential hypertension 5/12/2016   • GERD (gastroesophageal reflux disease)    • Heart block    • Hypertension    • Hypertensive heart disease    • Hyperthyroidism    • Kidney stone    • Leukopenia    • Low back pain    • MGUS (monoclonal gammopathy of unknown significance)    • Nephrolithiasis    • Obesity    • Paroxysmal atrial fibrillation (CMS/HCC)    • Peptic ulceration    • PSVT (paroxysmal supraventricular tachycardia) (CMS/HCC)    • Pulmonary hypertension (CMS/HCC)    • Rectal bleed    • Sleep apnea    • Trifascicular block    • 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)    • Ventricular tachycardia (CMS/HCC)     nonsustained   • Vertigo         Past Surgical History:   Procedure Laterality Date   • BACK SURGERY      lumbar  fusion   • CARDIAC ELECTROPHYSIOLOGY PROCEDURE N/A 2018    Procedure: Pacemaker DC new   [place];  Surgeon: Jesus Granda MD;  Location:  [facility];  Service: Cardiology   • CHOLECYSTECTOMY     • COLONOSCOPY  2014    colitis, cryptitis,  tics, NBIH, TA w/low grade dysplasia   • COLONOSCOPY N/A 10/28/2019    Procedure: COLONOSCOPY WITH COLD AND HOT POLYPECTOMIES;  Surgeon: Micaela English MD;  Location:  [facility];  Service: Gastroenterology   • HEMORRHOIDECTOMY     • HYSTERECTOMY     • KNEE ARTHROPLASTY     • MYOMECTOMY     • SHOULDER SURGERY     • SINUS SURGERY     • TOE NAIL AMPUTATION  2019   • TONSILLECTOMY          Social History     Occupational History   • Occupation: Caregiver     Employer: RETIRED     Comment: worked for [employer]   Tobacco Use   • Smoking status: Former Smoker     Packs/day: 1.50     Years: 10.00     Pack years: 15.00     Start date:      Last attempt to quit:      Years since quittin.2   • Smokeless tobacco: Never Used   • Tobacco comment: QUIT SMOKING    Substance and Sexual Activity   • Alcohol use: No     Comment: Daily caffeine use - one cup of coffee   • Drug use: Defer   • Sexual activity: Defer    Social History     Social History Narrative   • Not on file        Family History   Problem Relation Age of Onset   • Diabetes Mother    • Breast cancer Sister    • Kidney cancer Sister    • Heart disease Sister    • Prostate cancer Brother    • Prostate cancer Brother    • Prostate cancer Brother    • Malig Hyperthermia Neg Hx        ROS: 14 point review of systems was performed and was negative except for documented findings in HPI and today's encounter.     Allergies:   Allergies   Allergen Reactions   • Codeine Hallucinations   • Amitriptyline Rash   • Amoxicillin-Pot Clavulanate Rash   • Aspirin Unknown - Low Severity     Patient doesn't know why   • Bactrim [Sulfamethoxazole-Trimethoprim] Rash   •  Carisoprodol-Aspirin-Codeine Palpitations   • Iodinated Diagnostic Agents Rash   • Latex Rash   • Naproxen Rash   • Nsaids Unknown - Low Severity     unkknown   • Soma Compound With Codeine [Carisoprodol-Aspirin-Codeine] Rash   • Sulfa Antibiotics Rash   • Tramadol Palpitations     heart races      Constitutional:  Denies fever, shaking or chills   Eyes:  Denies change in visual acuity   HENT:  Denies nasal congestion or sore throat   Respiratory:  Denies cough or shortness of breath   Cardiovascular:  Denies chest pain or severe LE edema   GI:  Denies abdominal pain, nausea, vomiting, bloody stools or diarrhea   Musculoskeletal:  Numbness, tingling, or loss of motor function only as noted above in history of present illness.  : Denies painful urination or hematuria  Integument:  Denies rash, lesion or ulceration   Neurologic:  Denies headache or focal weakness  Endocrine:  Denies lymphadenopathy  Psych:  Denies confusion or change in mental status   Hem:  Denies active bleeding      Physical Exam: 84 y.o. female  Wt Readings from Last 3 Encounters:   03/13/20 96.2 kg (212 lb 1.6 oz)   02/05/20 98.9 kg (218 lb)   01/26/20 93.9 kg (207 lb)       Body mass index is 36.41 kg/m².  Facility age limit for growth percentiles is 20 years.  Vitals:    03/13/20 0819   Temp: 97.9 °F (36.6 °C)     Vital signs reviewed.   General Appearance:    Alert, cooperative, in no acute distress                  Eyes: conjunctiva clear  ENT: external ears and nose atraumatic  CV: no peripheral edema  Resp: normal respiratory effort  Skin: no rashes or wounds; normal turgor  Psych: mood and affect appropriate  Lymph: no nodes appreciated  Neuro: gross sensation intact  Vascular:  Palpable peripheral pulse in noted extremity    Ortho exam  Physical exam the right and left shoulder reveals no overlying skin changes no lymphedema lymphadenopathy the patient can actively flex to about 150 passively I get them to 160 abduction is similar  external rotation is 40 internal rotation to there buttock.  Rotator cuff strength is 4+ over 5 with isometric strength testing no overlying skin changes.  Patient has reasonable cervical range of motion for their age no radicular symptoms and a normal elbow exam.  There are good distal pulses.      X-rays AP scapular on x-ray lateral left shoulder were taken to evaluate her symptoms and compared to previous films she does have degenerative changes about the glenohumeral joint with flattening the humeral head and osteophyte formation         Assessment: Bilateral shoulder DJD    Plan: Injections  Follow up as indicated.  Ice, elevate, and rest as needed  Hyacinth Lovell M.D.        Large Joint Arthrocentesis: L glenohumeral  Date/Time: 3/13/2020 8:21 AM  Consent given by: patient  Site marked: site marked  Timeout: Immediately prior to procedure a time out was called to verify the correct patient, procedure, equipment, support staff and site/side marked as required   Supporting Documentation  Indications: pain   Procedure Details  Location: shoulder - L glenohumeral  Preparation: Patient was prepped and draped in the usual sterile fashion  Needle size: 22 G  Approach: posterior  Medications administered: 80 mg methylPREDNISolone acetate 80 MG/ML; 4 mL lidocaine PF 2% 2 %  Patient tolerance: patient tolerated the procedure well with no immediate complications    Large Joint Arthrocentesis: R glenohumeral  Date/Time: 3/13/2020 8:21 AM  Consent given by: patient  Site marked: site marked  Timeout: Immediately prior to procedure a time out was called to verify the correct patient, procedure, equipment, support staff and site/side marked as required   Supporting Documentation  Indications: pain   Procedure Details  Location: shoulder - R glenohumeral  Preparation: Patient was prepped and draped in the usual sterile fashion  Needle size: 22 G  Approach: posterior  Medications administered: 4 mL lidocaine PF 2% 2 %; 80 mg  methylPREDNISolone acetate 80 MG/ML  Patient tolerance: patient tolerated the procedure well with no immediate complications

## 2020-03-24 RX ORDER — OMEPRAZOLE 20 MG/1
CAPSULE, DELAYED RELEASE ORAL
Qty: 90 CAPSULE | Refills: 0 | Status: SHIPPED | OUTPATIENT
Start: 2020-03-24 | End: 2020-05-19

## 2020-03-30 ENCOUNTER — TELEPHONE (OUTPATIENT)
Dept: GASTROENTEROLOGY | Facility: CLINIC | Age: 85
End: 2020-03-30

## 2020-03-30 NOTE — TELEPHONE ENCOUNTER
"Call to pt.  Has noted blood clots \"size of cottage cheese\" in toilet water every morning.  Preceded by discomfort in suprapubic area - alleviated by passing stool.  When has BM later in day, only very scant amount blood noted on stool.  Denies fever.      Taking apriso (mesalamine)  4 caps QAM as ordered.      Concerned re: persistent AM pattern - asking what to do aout this.  Message to Dr English.   "

## 2020-03-30 NOTE — TELEPHONE ENCOUNTER
----- Message from Matty Wagoner sent at 3/30/2020 10:42 AM EDT -----  Regarding: passing blood  Contact: [phone]  Pt is calling because she is passing blood but only in the morning, she wants to know what she should do.

## 2020-03-31 ENCOUNTER — TELEMEDICINE (OUTPATIENT)
Dept: GASTROENTEROLOGY | Facility: CLINIC | Age: 85
End: 2020-03-31

## 2020-03-31 DIAGNOSIS — K51.30 ULCERATIVE RECTOSIGMOIDITIS WITHOUT COMPLICATION (HCC): ICD-10-CM

## 2020-03-31 DIAGNOSIS — K62.5 RECTAL BLEEDING: Primary | ICD-10-CM

## 2020-03-31 PROCEDURE — 99024 POSTOP FOLLOW-UP VISIT: CPT | Performed by: INTERNAL MEDICINE

## 2020-03-31 RX ORDER — HYDROCORTISONE ACETATE 25 MG/1
25 SUPPOSITORY RECTAL NIGHTLY PRN
Qty: 30 SUPPOSITORY | Refills: 3 | Status: SHIPPED | OUTPATIENT
Start: 2020-03-31 | End: 2020-07-22

## 2020-03-31 NOTE — PROGRESS NOTES
Subjective   Chief Complaint   Patient presents with   • Rectal Bleeding       Brittany Villeda is a  84 y.o. female here for a follow up visit for rectal bleeding.     C/o small amounts of rectal bleeding in the morning upon wakening.  She describes it as small chunks with the appearance of cottage cheese curds.  The blood has been bright red blood as well as dark blood.  She has no abdominal discomfort and her bowel movements are solid otherwise.  She has a history of ulcerative colitis and she takes Apriso 1.5 g/day.  She is anticoagulated on Coumadin.  She otherwise feels well.  She may see some blood streaking throughout the day but it is a scant amount.  HPI  Past Medical History:   Diagnosis Date   • Anemia    • Arrhythmia    • Arthritis    • Asthma    • Bradycardia    • Chest pain    • Colitis    • COPD (chronic obstructive pulmonary disease) (CMS/HCC)    • Diastolic dysfunction    • Essential hypertension 5/12/2016   • GERD (gastroesophageal reflux disease)    • Heart block    • Hypertension    • Hypertensive heart disease    • Hyperthyroidism    • Kidney stone    • Leukopenia    • Low back pain    • MGUS (monoclonal gammopathy of unknown significance)    • Nephrolithiasis    • Obesity    • Paroxysmal atrial fibrillation (CMS/HCC)    • Peptic ulceration    • PSVT (paroxysmal supraventricular tachycardia) (CMS/HCC)    • Pulmonary hypertension (CMS/HCC)    • Rectal bleed    • Sleep apnea    • Trifascicular block    • 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)    • Ventricular tachycardia (CMS/HCC)     nonsustained   • Vertigo      Past Surgical History:   Procedure Laterality Date   • BACK SURGERY      lumbar fusion   • CARDIAC ELECTROPHYSIOLOGY PROCEDURE N/A 11/7/2018    Procedure: Pacemaker DC new   [place];  Surgeon: Jesus Granda MD;  Location: [facility] INVASIVE LOCATION;  Service: Cardiology   • CHOLECYSTECTOMY     • COLONOSCOPY  06/16/2014    colitis, cryptitis,  tics, NBIH,  TA w/low grade dysplasia   • COLONOSCOPY N/A 10/28/2019    Procedure: COLONOSCOPY WITH COLD AND HOT POLYPECTOMIES;  Surgeon: Micaela English MD;  Location: [facility] ENDOSCOPY;  Service: Gastroenterology   • HEMORRHOIDECTOMY     • HYSTERECTOMY     • KNEE ARTHROPLASTY     • MYOMECTOMY     • SHOULDER SURGERY     • SINUS SURGERY     • TOE NAIL AMPUTATION  03/04/2019   • TONSILLECTOMY         Current Outpatient Medications:   •  Acetaminophen (PAIN RELIEVER PO), Take 1 tablet by mouth As Needed., Disp: , Rfl:   •  albuterol sulfate  (90 Base) MCG/ACT inhaler, Inhale 2 puffs Every 6 (Six) Hours As Needed for Wheezing., Disp: 1 inhaler, Rfl: 0  •  calcium carbonate-cholecalciferol 500-400 MG-UNIT tablet tablet, Take 1 tablet by mouth Daily., Disp: , Rfl:   •  furosemide (LASIX) 20 MG tablet, TAKE 2 TABLETS IN THE MORNING  AND TAKE 1 TABLET EVERY EVENING, Disp: 270 tablet, Rfl: 1  •  gabapentin (NEURONTIN) 300 MG capsule, TAKE 1 CAPSULE FOUR TIMES DAILY, Disp: 360 capsule, Rfl: 1  •  HYDROcodone-acetaminophen (NORCO) 5-325 MG per tablet, 1/2-1 tab po bid prn severe L shoulder pain. Do not take with tylenol., Disp: 20 tablet, Rfl: 0  •  hydrocortisone (ANUSOL-HC) 25 MG suppository, Insert 1 suppository into the rectum At Night As Needed for Hemorrhoids (rectal discomfort/bleeding)., Disp: 30 suppository, Rfl: 3  •  meclizine (ANTIVERT) 25 MG tablet, Take 1 tablet by mouth 3 (Three) Times a Day As Needed for Dizziness., Disp: 12 tablet, Rfl: 0  •  mesalamine (APRISO) 0.375 g 24 hr capsule, TAKE 4 CAPSULES DAILY, Disp: 360 capsule, Rfl: 1  •  omeprazole (priLOSEC) 20 MG capsule, TAKE 1 CAPSULE EVERY DAY, Disp: 90 capsule, Rfl: 0  •  ondansetron (ZOFRAN) 4 MG tablet, Take 1 tablet by mouth Every 6 (Six) Hours As Needed for Nausea or Vomiting., Disp: 10 tablet, Rfl: 0  •  vitamin B-12 (CYANOCOBALAMIN) 1000 MCG tablet, Take 1,000 mcg by mouth Daily., Disp: , Rfl:   •  warfarin (COUMADIN) 5 MG tablet, Take one-half tablet  (2.5 mg) by mouth on Sat, and take one tablet (5 mg) by mouth all other days or as directed., Disp: 85 tablet, Rfl: 0  PRN Meds:.  Allergies   Allergen Reactions   • Codeine Hallucinations   • Amitriptyline Rash   • Amoxicillin-Pot Clavulanate Rash   • Aspirin Unknown - Low Severity     Patient doesn't know why   • Bactrim [Sulfamethoxazole-Trimethoprim] Rash   • Carisoprodol-Aspirin-Codeine Palpitations   • Iodinated Diagnostic Agents Rash   • Latex Rash   • Naproxen Rash   • Nsaids Unknown - Low Severity     unkknown   • Soma Compound With Codeine [Carisoprodol-Aspirin-Codeine] Rash   • Sulfa Antibiotics Rash   • Tramadol Palpitations     heart races      Social History     Socioeconomic History   • Marital status:      Spouse name: Not on file   • Number of children: 10   • Years of education: High School   • Highest education level: Not on file   Occupational History   • Occupation: Caregiver     Employer: RETIRED     Comment: worked for [employer]   Tobacco Use   • Smoking status: Former Smoker     Packs/day: 1.50     Years: 10.00     Pack years: 15.00     Start date:      Last attempt to quit:      Years since quittin.2   • Smokeless tobacco: Never Used   • Tobacco comment: QUIT SMOKING    Substance and Sexual Activity   • Alcohol use: No     Comment: Daily caffeine use - one cup of coffee   • Drug use: Defer   • Sexual activity: Defer     Family History   Problem Relation Age of Onset   • Diabetes Mother    • Breast cancer Sister    • Kidney cancer Sister    • Heart disease Sister    • Prostate cancer Brother    • Prostate cancer Brother    • Prostate cancer Brother    • Malig Hyperthermia Neg Hx      There were no vitals filed for this visit.  There were no vitals filed for this visit.    Objective   No radiology results for the last 7 days    Assessment/Plan   Diagnoses and all orders for this visit:    Rectal bleeding    Ulcerative rectosigmoiditis without  complication (CMS/HCC)    Other orders  -     hydrocortisone (ANUSOL-HC) 25 MG suppository; Insert 1 suppository into the rectum At Night As Needed for Hemorrhoids (rectal discomfort/bleeding).      Plan:  · Continue Apriso 1.5 g/day  · Bleeding is minimal-not concerned at this point and it is likely related to hemorrhoids or proctitis in the setting of Coumadin use.  · We will try suppository in the hopes that this will cut down on bleeding.  Patient to call if bleeding worsens or her symptoms change or are concerning.    Length of call 4 minutes

## 2020-04-02 NOTE — PROGRESS NOTES
"Subjective:   Tamy Hines is a 20 y.o. female here today for evaluation and management of:     Encounter for surveillance of injectable contraceptive  Been on the Depo for a few years.   No periods on it.   LMP: a few years ago  Last Depo December.   She is sexually active with one partner. She denies any partner abuse.   Urine pregnancy test is negative in clinic.   Will provide depo in clinic today.   Immunizations updated and provided in clinic today.       Plantar wart  Left hand wart been there for about 2 months.   Does not want cryotherapy  Advised using wart pad OTC.          Current medicines (including changes today)  Current Outpatient Medications   Medication Sig Dispense Refill   • amitriptyline (ELAVIL) 50 MG Tab Take 1 Tab by mouth every bedtime. 90 Tab 3   • ibuprofen (MOTRIN) 200 MG Tab Take 400 mg by mouth every 6 hours as needed.       No current facility-administered medications for this visit.      She  has a past medical history of Allergic rhinitis, ASTHMA (8/15/2013), Bowel habit changes, Gynecological disorder, Heart burn, Indigestion, Menarche (8/15/2013), and Nausea, vomiting and diarrhea (11/19/2014).    ROS  No chest pain, no shortness of breath, no abdominal pain       Objective:     BP (!) 98/66   Pulse 75   Temp 36.7 °C (98.1 °F) (Temporal)   Resp 16   Ht 1.689 m (5' 6.5\")   Wt 87.1 kg (192 lb)   SpO2 95%  Body mass index is 30.53 kg/m².   Physical Exam:  Constitutional: Alert, no distress.  Skin: Warm, dry, good turgor,small wart on palm of left hand  Eye: Equal, round and reactive, conjunctiva clear, lids normal.  ENMT: Lips without lesions,  Neck: Trachea midline  Respiratory: Unlabored respiratory effort  Psych: Alert and oriented x3, normal affect and mood.        Assessment and Plan:   The following treatment plan was discussed    1. Need for vaccination  - Pneumovax Vaccine (PPSV23)  - Meningococcal Vaccine Serogroup B 2-3 Dose (TRUMENBA)  - 9VHPV Vaccine 2-3 " Date of Office Visit: 10/24/2018  Encounter Provider: aMry Grace Culp MD  Place of Service: [facility] CARDIOLOGY  Patient Name: Brittany Villeda  :1935    Chief complaint  follow-up of paroxysmal atrial fibrillation, hypertension with hypertensive heart disease conduction disease and chronic warfarin therapy     History of Present Illness   The patient is a pleasant, 83-year-old female with a history of hypertension, obstructive sleep apnea, obesity, immobility, pulmonary hypertension, history of nonsustained ventricular tachycardia, and obstructive sleep apnea.  She has a history of diastolic dysfunction.  In , she had nonsustained ventricular tachycardia.  A stress perfusion study was negative for ischemia.  An echocardiogram revealed normal left ventricular size and function with moderate left ventricular hypertrophy.  In 2012, she had chronic obstructive pulmonary disease exacerbation as well as chest pain that resolved with treatment of her chronic obstructive pulmonary disease.  In 2013, she was seen for persistent dizziness in the setting of new-onset paroxysmal atrial fibrillation.  She was admitted to the hospital and not felt to have had a stroke.  Her symptoms actually resolved with ear manipulation and were felt to be more vestibular in nature.  She also developed paroxysmal atrial fibrillation with early bradycardia which resolved with treatment of her sleep apnea and AV flaco blocker therapy.  She was placed on warfarin.  She also had a heme-positive stool with anemia and was seen by the gastrointestinal doctors and EGD and colonoscopy were performed. The EGD revealed mild erythema but otherwise stable.  She initiated therapy with a BiPAP.  In 2017 presented to the emergency room with chest pain.  She ruled out for myocardial infarction.  A stress perfusion study that was negative for ischemia with normal systolic function.  A Ziopatch revealed  Dose (GARDASIL 9)    2. Encounter for surveillance of contraceptives, unspecified contraceptive  Depo sub Q provided. Follow up every 3 months.   - POCT Pregnancy      3. Plantar wart  Advised on treatment with OTC salicylate products and will return to clinic for cryotherapy if needed.       Followup: as needed.   3 months with MA visit for depo provera.           sinus rhythm with episodes of supra-ventricular tachycardia that was symptomatic.  In September 2018 EKG showed pauses in the setting of bifascicular block.  A 24-hour Holter revealed 65 pauses 3 seconds longest induration.  Sinus rhythm was present throughout most of the study.  With complaints of palpitations PACs were noted.  Current 22.2% of the monitor time there is also 14 episodes of atrial tachycardia lasting 4 beats.  There were also episodes of 2-1 AV block and questionable third degree.  An echocardiogram revealed normal systolic function grade 1 diastolic dysfunction, borderline right ventricular enlargement, mild to moderate tricuspid valve regurgitation with an RV systolic pressure 48 mmHg.  The ascending aorta was mildly dilated at 3.8 cm.  I recommended a pacemaker placement but she deferred in which she discuss this further with her daughter and is here today regarding this.    Since last visit she continues to feel well.  She denies any chest pain, shortness of breath, palpitations, syncope near syncope.  He has not been checking her blood pressure heart rate consistently at home.  She is not treating sleep apnea.    Past Medical History:   Diagnosis Date   • Anemia    • Arthritis    • Asthma    • Chest pain    • Colitis    • COPD (chronic obstructive pulmonary disease) (CMS/HCC)    • Diastolic dysfunction    • Essential hypertension 5/12/2016   • GERD (gastroesophageal reflux disease)    • Hypertension    • Hypertensive heart disease    • Hyperthyroidism    • Kidney stone    • Low back pain    • MGUS (monoclonal gammopathy of unknown significance)    • Nephrolithiasis    • Obesity    • Paroxysmal atrial fibrillation (CMS/HCC)    • Peptic ulceration    • Pulmonary hypertension (CMS/HCC)    • Sleep apnea    • Ventricular tachycardia (CMS/HCC)     nonsustained   • Vertigo      Past Surgical History:   Procedure Laterality Date   • BACK SURGERY      lumbar fusion   • CHOLECYSTECTOMY     • COLONOSCOPY   06/16/2014    colitis, cryptitis,  tics, NBIH, TA w/low grade dysplasia   • HEMORRHOIDECTOMY     • HYSTERECTOMY     • KNEE ARTHROPLASTY     • MYOMECTOMY     • SHOULDER SURGERY     • SINUS SURGERY     • TONSILLECTOMY       Outpatient Medications Prior to Visit   Medication Sig Dispense Refill   • Acetaminophen (PAIN RELIEVER PO) Take  by mouth As Needed.     • calcium carbonate (OS-ALIDA) 600 MG tablet Take 600 mg by mouth Daily.     • ferrous sulfate 325 (65 FE) MG tablet Take 1 tablet by mouth Daily With Breakfast. (Patient taking differently: Take 325 mg by mouth. Take 1 po every other week)     • furosemide (LASIX) 20 MG tablet Take 2 tablets by mouth Every Morning. 20mg in the  tablet 1   • gabapentin (NEURONTIN) 300 MG capsule Take 1 capsule by mouth 2 (Two) Times a Day. (Patient taking differently: Take 300 mg by mouth 3 (Three) Times a Day.) 180 capsule 1   • mesalamine (LIALDA) 1.2 g EC tablet Take 4 tablets by mouth Daily. 360 tablet 0   • mometasone (ELOCON) 0.1 % ointment Apply 1 application topically to the appropriate area as directed 2 (Two) Times a Day. As needed     • omeprazole (priLOSEC) 20 MG capsule TAKE 1 CAPSULE EVERY DAY 90 capsule 0   • vitamin B-12 (CYANOCOBALAMIN) 1000 MCG tablet Take 1,000 mcg by mouth Daily.     • warfarin (COUMADIN) 5 MG tablet Take 1 tablet by mouth Daily. 90 tablet 1     No facility-administered medications prior to visit.        Allergies as of 10/24/2018 - Reviewed 10/24/2018   Allergen Reaction Noted   • Amitriptyline  05/12/2016   • Amoxicillin-pot clavulanate  05/12/2016   • Aspirin  05/12/2016   • Bactrim [sulfamethoxazole-trimethoprim]  05/12/2016   • Codeine  11/30/2017   • Iodinated diagnostic agents  05/12/2016   • Latex  09/12/2016   • Naproxen  05/12/2016   • Nsaids  05/12/2016   • Soma compound with codeine [carisoprodol-aspirin-codeine]  09/12/2016   • Sulfa antibiotics  05/12/2016     Social History     Social History   • Marital status:       "Spouse name: N/A   • Number of children: 10   • Years of education: High School     Occupational History   • Caregiver Retired     worked for [employer]     Social History Main Topics   • Smoking status: Former Smoker     Packs/day: 1.50     Years: 10.00   • Smokeless tobacco: Never Used   • Alcohol use No      Comment: Daily caffeine use   • Drug use: No   • Sexual activity: No     Other Topics Concern   • Not on file     Social History Narrative   • No narrative on file     Family History   Problem Relation Age of Onset   • Diabetes Mother    • Breast cancer Sister    • Kidney cancer Sister    • Heart disease Sister    • Prostate cancer Brother    • Prostate cancer Brother    • Prostate cancer Brother      Review of Systems   Constitution: Negative for fever, malaise/fatigue, weight gain and weight loss.   HENT: Negative for ear pain, hearing loss, nosebleeds and sore throat.    Eyes: Negative for double vision, pain, vision loss in left eye and vision loss in right eye.   Cardiovascular:        See history of present illness.   Respiratory: Negative for cough, shortness of breath, sleep disturbances due to breathing, snoring and wheezing.    Endocrine: Negative for cold intolerance, heat intolerance and polyuria.   Skin: Negative for itching, poor wound healing and rash.   Musculoskeletal: Positive for myalgias. Negative for joint pain and joint swelling.   Gastrointestinal: Negative for abdominal pain, diarrhea, hematochezia, nausea and vomiting.   Genitourinary: Negative for hematuria and hesitancy.   Neurological: Negative for numbness, paresthesias and seizures.   Psychiatric/Behavioral: Negative for depression. The patient is not nervous/anxious.         Objective:     Vitals:    10/24/18 0936   BP: 108/65   Pulse: 64   Weight: 103 kg (227 lb)   Height: 162.6 cm (64\")     Body mass index is 38.96 kg/m².    Physical Exam   Constitutional: She is oriented to person, place, and time. She appears " well-developed and well-nourished.   Obese   HENT:   Head: Normocephalic.   Nose: Nose normal.   Mouth/Throat: Oropharynx is clear and moist.   Eyes: Pupils are equal, round, and reactive to light. Conjunctivae and EOM are normal. Right eye exhibits no discharge. No scleral icterus.   Neck: Normal range of motion. Neck supple. No JVD present. No thyromegaly present.   Cardiovascular: Normal rate, regular rhythm, normal heart sounds and intact distal pulses.  Exam reveals no gallop and no friction rub.    No murmur heard.  Pulses:       Carotid pulses are 2+ on the right side, and 2+ on the left side.       Radial pulses are 2+ on the right side, and 2+ on the left side.        Femoral pulses are 2+ on the right side, and 2+ on the left side.       Popliteal pulses are 2+ on the right side, and 2+ on the left side.        Dorsalis pedis pulses are 2+ on the right side, and 2+ on the left side.        Posterior tibial pulses are 2+ on the right side, and 2+ on the left side.   Pulmonary/Chest: Effort normal and breath sounds normal. No respiratory distress. She has no wheezes. She has no rales.   Abdominal: Soft. Bowel sounds are normal. She exhibits no distension. There is no hepatosplenomegaly. There is no tenderness. There is no rebound.   Musculoskeletal: Normal range of motion. She exhibits no edema or tenderness.   Neurological: She is alert and oriented to person, place, and time.   Skin: Skin is warm and dry. No rash noted. No erythema.   Psychiatric: She has a normal mood and affect. Her behavior is normal. Judgment and thought content normal.   Vitals reviewed.    Lab Review:     ECG 12 Lead  Date/Time: 10/28/2018 6:58 PM  Performed by: DAMIAN PAZ  Authorized by: DAMIAN PAZ   Comparison: compared with previous ECG   Rhythm: sinus rhythm  Conduction: LAFB and 2nd degree (Mobitz 2)  Clinical impression: abnormal ECG          Assessment:       Diagnosis Plan   1. Bradycardia  Ambulatory Referral to Cardiac  Electrophysiology   2. PAF (paroxysmal atrial fibrillation) (CMS/[facility])  Ambulatory Referral to Cardiac Electrophysiology   3. Heart block  Ambulatory Referral to Cardiac Electrophysiology   4. HUGO (obstructive sleep apnea)  Ambulatory Referral to Sleep Medicine     Plan:       1.  Distant history of paroxysmal atrial fibrillation and paroxysmal supraventricular tachycardia with sick sinus syndrome with intermittent type and complete heart block.  I had a long talk with the patient and 2 daughters who accompany her.  I strongly recommended a permanent pacemaker and treatment of sleep apnea.  She still does not wish to do so as she feels quite well despite my discussion of possible sudden syncope/death.  She would like additional discussion with electrophysiologist in the presence of her daughters. With this in mind and request a consult with   2.  Supraventricular tachycardia. As above  3.  Hypertension, controlled  4.  History of diastolic dysfunction.   5.  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  Pulmonary hypertension, RVSP 48mmHg  7   Hx of chest pain.  Negative PET scan in December 2017  8.  Untreated HUGO, intolerant of BiPAP  9.  Probable ascending aortic aneurysm.  Will check a CT angiogram of the chest    Atrial Fibrillation and Atrial Flutter  Assessment  • The patient has paroxysmal atrial fibrillation  • This is non-valvular in etiology  • The patient's CHADS2-VASc score is 4  • A [id number] score of 2 or more is considered a high risk for a thromboembolic event  • Warfarin prescribed    Plan  • Attempt to maintain sinus rhythm  • Continue warfarin for antithrombotic therapy, bleeding issues discussed       Your medication list          Accurate as of 10/24/18  9:54 PM. If you have any questions, ask your nurse or doctor.               CHANGE how you take these medications      Instructions Last Dose Given Next Dose Due   ferrous sulfate 325 (65 FE) MG  tablet  What changed:  · when to take this  · additional instructions      Take 1 tablet by mouth Daily With Breakfast.       gabapentin 300 MG capsule  Commonly known as:  NEURONTIN  What changed:  when to take this      Take 1 capsule by mouth 2 (Two) Times a Day.          CONTINUE taking these medications      Instructions Last Dose Given Next Dose Due   calcium carbonate 600 MG tablet  Commonly known as:  OS-ALIDA      Take 600 mg by mouth Daily.       furosemide 20 MG tablet  Commonly known as:  LASIX      Take 2 tablets by mouth Every Morning. 20mg in the PM       mesalamine 1.2 g EC tablet  Commonly known as:  LIALDA      Take 4 tablets by mouth Daily.       mometasone 0.1 % ointment  Commonly known as:  ELOCON      Apply 1 application topically to the appropriate area as directed 2 (Two) Times a Day. As needed       omeprazole 20 MG capsule  Commonly known as:  priLOSEC      TAKE 1 CAPSULE EVERY DAY       PAIN RELIEVER PO      Take  by mouth As Needed.       vitamin B-12 1000 MCG tablet  Commonly known as:  CYANOCOBALAMIN      Take 1,000 mcg by mouth Daily.       warfarin 5 MG tablet  Commonly known as:  COUMADIN      Take 1 tablet by mouth Daily.                Dictated utilizing Dragon dictation

## 2020-04-06 ENCOUNTER — TELEPHONE (OUTPATIENT)
Dept: PHARMACY | Facility: HOSPITAL | Age: 85
End: 2020-04-06

## 2020-04-06 ENCOUNTER — TELEPHONE (OUTPATIENT)
Dept: CARDIOLOGY | Facility: CLINIC | Age: 85
End: 2020-04-06

## 2020-04-06 DIAGNOSIS — I48.19 ATRIAL FIBRILLATION, PERSISTENT (HCC): Primary | ICD-10-CM

## 2020-04-06 NOTE — TELEPHONE ENCOUNTER
4/6/20  Pt left [facility] - has appt on 4/17 - asking what she should do - keep appt or reschedule - feels fine.  Her ph  [phone]- I did not get an answer when I called back.  Please call pt to either reschedule or set up as televisit./raza

## 2020-04-07 RX ORDER — WARFARIN SODIUM 5 MG/1
TABLET ORAL
Qty: 85 TABLET | Refills: 0 | Status: SHIPPED | OUTPATIENT
Start: 2020-04-07 | End: 2020-06-01

## 2020-04-17 ENCOUNTER — OFFICE VISIT (OUTPATIENT)
Dept: CARDIOLOGY | Facility: CLINIC | Age: 85
End: 2020-04-17

## 2020-04-17 ENCOUNTER — CLINICAL SUPPORT NO REQUIREMENTS (OUTPATIENT)
Dept: CARDIOLOGY | Facility: CLINIC | Age: 85
End: 2020-04-17

## 2020-04-17 VITALS
HEIGHT: 64 IN | WEIGHT: 214.8 LBS | HEART RATE: 74 BPM | DIASTOLIC BLOOD PRESSURE: 70 MMHG | BODY MASS INDEX: 36.67 KG/M2 | SYSTOLIC BLOOD PRESSURE: 115 MMHG

## 2020-04-17 DIAGNOSIS — I44.2 THIRD DEGREE AV BLOCK (HCC): Primary | ICD-10-CM

## 2020-04-17 DIAGNOSIS — I47.1 PAROXYSMAL SVT (SUPRAVENTRICULAR TACHYCARDIA) (HCC): ICD-10-CM

## 2020-04-17 DIAGNOSIS — Z95.0 PACEMAKER: ICD-10-CM

## 2020-04-17 DIAGNOSIS — I48.19 ATRIAL FIBRILLATION, PERSISTENT (HCC): Primary | ICD-10-CM

## 2020-04-17 DIAGNOSIS — G47.33 OSA (OBSTRUCTIVE SLEEP APNEA): ICD-10-CM

## 2020-04-17 DIAGNOSIS — I51.89 DIASTOLIC DYSFUNCTION: ICD-10-CM

## 2020-04-17 DIAGNOSIS — I10 ESSENTIAL HYPERTENSION: ICD-10-CM

## 2020-04-17 PROCEDURE — 93294 REM INTERROG EVL PM/LDLS PM: CPT | Performed by: INTERNAL MEDICINE

## 2020-04-17 PROCEDURE — 93296 REM INTERROG EVL PM/IDS: CPT | Performed by: INTERNAL MEDICINE

## 2020-04-17 PROCEDURE — 99441 PR PHYS/QHP TELEPHONE EVALUATION 5-10 MIN: CPT | Performed by: NURSE PRACTITIONER

## 2020-04-17 NOTE — PROGRESS NOTES
Date of Office Visit: 2020  Encounter Provider: Tereza Jackson, NIKITA, APRN  Place of Service: [facility] CARDIOLOGY  Patient Name: Brittany Villeda  :1935        Subjective:     Chief Complaint:  Follow-up paroxysmal atrial fibrillation, hypertension, chronic anticoagulation therapy.      History of Present Illness:  Brittany Villeda is a 85 y.o. female patient of Dr. Culp.  I am doing a telehealth visit with patient and I have reviewed her records.    Patient has a history of  paroxysmal atrial fibrillation, hypertension, obstructive sleep apnea, obesity, imobility, pulmonary hypertension, nonsustained ventricular tachycardia, diastolic dysfunction.     In  patient had nonsustained ventricular tachycardia.  Stress perfusion study was negative for ischemia.  Echocardiogram showed normal left ventricular size and function with moderate LVH.  2012 she had chronic obstructive pulmonary disease exacerbation as well as chest pain that resolved with treatment of her COPD.  2013 she had persistent dizziness in the setting of new onset paroxysmal atrial fibrillation.  She was admitted to the hospital and not felt to have had a stroke.  Symptoms actually resolved with ear manipulation and were felt to be more vestibular in nature.  She also developed paroxysmal atrial fibrillation with early bradycardia which resolved with treatment of sleep apnea and AV flaco blocker therapy.  She was placed on warfarin.  She also had a heme positive stool with anemia and was seen by gastroenterology and EGD and colonoscopy were done.  EGD showed mild erythema but otherwise stable.  She initiated therapy of BiPAP.  2017 she presented to the ER with chest pain.  Ruled out for MI.  Stress perfusion study was negative for ischemia with normal systolic function.  Zio patch shows sinus rhythm with episodes of SVT that was symptomatic.  2018 EKG showed pauses in the  setting of bifascicular block.  24-hour Holter showed 65 pauses 3 seconds long in duration.  Sinus rhythm was present throughout most of the study.  With complaints of palpitations PACs were noted.  There were also 14 episodes of atrial tachycardia lasting 4 beats.  There is also episode of 2-1 AV block and questionable third-degree block.  Echocardiogram showed normal systolic function, grade 1 diastolic dysfunction, borderline right ventricular enlargement, mild to moderate tricuspid valve regurgitation, and RVSP of 48 mmHg.  A sending aorta was mildly dilated at 3.8 cm.  Pacemaker placement was recommended the patient deferred.  By September 2018 patient was noted to have more symptomatic bradycardia and after much discussion underwent pacemaker placement November 2018.       Patient has called into the office today for a telehealth phone follow-up appointment.  Patient reports she has been feeling good since last visit.  She is staying active around the house and goes out to walk back and forth on back deck and walks back and forth in the [place] to stay active. Not doing formal exercise but says she stays busy.  Denies exertional issues or concerns. BP usually 110s-120s/60s-70s,  HR 60s-70s. Home health is going to be checking INRs in place of AC clinic for the time being.  Denies chest pain, shortness of breath, shortness of breath with exertion, palpitations, edema (wears compression socks), dizziness, syncope, falls, fatigue, or abnormal bleeding.         Past Medical History:   Diagnosis Date   • Anemia    • Arrhythmia    • Arthritis    • Asthma    • Bradycardia    • Chest pain    • Colitis    • COPD (chronic obstructive pulmonary disease) (CMS/[facility])    • Diastolic dysfunction    • Essential hypertension 5/12/2016   • GERD (gastroesophageal reflux disease)    • Heart block    • Hypertension    • Hypertensive heart disease    • Hyperthyroidism    • Kidney stone    • Leukopenia    • Low back pain    • MGUS  (monoclonal gammopathy of unknown significance)    • Nephrolithiasis    • Obesity    • Paroxysmal atrial fibrillation (CMS/HCC)    • Peptic ulceration    • PSVT (paroxysmal supraventricular tachycardia) (CMS/HCC)    • Pulmonary hypertension (CMS/HCC)    • Rectal bleed    • Sleep apnea    • Trifascicular block    • 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)    • Ventricular tachycardia (CMS/HCC)     nonsustained   • Vertigo      Past Surgical History:   Procedure Laterality Date   • BACK SURGERY      lumbar fusion   • CARDIAC ELECTROPHYSIOLOGY PROCEDURE N/A 11/7/2018    Procedure: Pacemaker DC new   [place];  Surgeon: Jesus Granda MD;  Location:  [facility] CATH INVASIVE LOCATION;  Service: Cardiology   • CHOLECYSTECTOMY     • COLONOSCOPY  06/16/2014    colitis, cryptitis,  tics, NBIH, TA w/low grade dysplasia   • COLONOSCOPY N/A 10/28/2019    Procedure: COLONOSCOPY WITH COLD AND HOT POLYPECTOMIES;  Surgeon: Micaela English MD;  Location:  [facility] ENDOSCOPY;  Service: Gastroenterology   • HEMORRHOIDECTOMY     • HYSTERECTOMY     • KNEE ARTHROPLASTY     • MYOMECTOMY     • SHOULDER SURGERY     • SINUS SURGERY     • TOE NAIL AMPUTATION  03/04/2019   • TONSILLECTOMY       Outpatient Medications Prior to Visit   Medication Sig Dispense Refill   • Acetaminophen (PAIN RELIEVER PO) Take 1 tablet by mouth As Needed.     • albuterol sulfate  (90 Base) MCG/ACT inhaler Inhale 2 puffs Every 6 (Six) Hours As Needed for Wheezing. 1 inhaler 0   • calcium carbonate-cholecalciferol 500-400 MG-UNIT tablet tablet Take 1 tablet by mouth Daily.     • furosemide (LASIX) 20 MG tablet TAKE 2 TABLETS IN THE MORNING  AND TAKE 1 TABLET EVERY EVENING 270 tablet 1   • gabapentin (NEURONTIN) 300 MG capsule TAKE 1 CAPSULE FOUR TIMES DAILY 360 capsule 1   • hydrocortisone (ANUSOL-HC) 25 MG suppository Insert 1 suppository into the rectum At Night As Needed for Hemorrhoids (rectal discomfort/bleeding). 30 suppository 3   •  meclizine (ANTIVERT) 25 MG tablet Take 1 tablet by mouth 3 (Three) Times a Day As Needed for Dizziness. 12 tablet 0   • mesalamine (APRISO) 0.375 g 24 hr capsule TAKE 4 CAPSULES DAILY 360 capsule 1   • omeprazole (priLOSEC) 20 MG capsule TAKE 1 CAPSULE EVERY DAY 90 capsule 0   • ondansetron (ZOFRAN) 4 MG tablet Take 1 tablet by mouth Every 6 (Six) Hours As Needed for Nausea or Vomiting. 10 tablet 0   • vitamin B-12 (CYANOCOBALAMIN) 1000 MCG tablet Take 1,000 mcg by mouth Daily.     • warfarin (COUMADIN) 5 MG tablet TAKE 1/2 TABLET  ON  SATURDAYS  AND TAKE 1 TABLET ALL OTHER DAYS OR AS DIRECTED 85 tablet 0   • HYDROcodone-acetaminophen (NORCO) 5-325 MG per tablet 1/2-1 tab po bid prn severe L shoulder pain. Do not take with tylenol. 20 tablet 0     No facility-administered medications prior to visit.        Allergies as of 04/17/2020 - Reviewed 04/17/2020   Allergen Reaction Noted   • Codeine Hallucinations 11/30/2017   • Amitriptyline Rash 05/12/2016   • Amoxicillin-pot clavulanate Rash 05/12/2016   • Aspirin Unknown - Low Severity 05/12/2016   • Bactrim [sulfamethoxazole-trimethoprim] Rash 05/12/2016   • Carisoprodol-aspirin-codeine Palpitations 09/12/2016   • Iodinated diagnostic agents Rash 05/12/2016   • Latex Rash 09/12/2016   • Naproxen Rash 05/12/2016   • Nsaids Unknown - Low Severity 05/12/2016   • Soma compound with codeine [carisoprodol-aspirin-codeine] Rash 09/12/2016   • Sulfa antibiotics Rash 05/12/2016   • Tramadol Palpitations 04/12/2019     Social History     Socioeconomic History   • Marital status:      Spouse name: Not on file   • Number of children: 10   • Years of education: High School   • Highest education level: Not on file   Occupational History   • Occupation: Caregiver     Employer: RETIRED     Comment: worked for [employer]   Tobacco Use   • Smoking status: Former Smoker     Packs/day: 1.50     Years: 10.00     Pack years: 15.00     Start date: 1953     Last attempt  "to quit: 1965     Years since quittin.3   • Smokeless tobacco: Never Used   • Tobacco comment: QUIT SMOKING    Substance and Sexual Activity   • Alcohol use: No     Comment: Daily caffeine use - one cup of coffee   • Drug use: Defer   • Sexual activity: Defer     Family History   Problem Relation Age of Onset   • Diabetes Mother    • Breast cancer Sister    • Kidney cancer Sister    • Heart disease Sister    • Prostate cancer Brother    • Prostate cancer Brother    • Prostate cancer Brother    • Malig Hyperthermia Neg Hx        Review of Systems   Cardiovascular: Negative for chest pain, dyspnea on exertion, leg swelling (Wears compression hose), palpitations and syncope.   Respiratory: Negative for shortness of breath.    Hematologic/Lymphatic: Negative for bleeding problem.   Musculoskeletal: Negative for falls.        Uses walker or cane   Gastrointestinal: Negative for melena.   Genitourinary: Negative for hematuria.   Neurological: Negative for dizziness.   Psychiatric/Behavioral: Negative for altered mental status.          Objective:     Vitals:    20 0932   BP: 115/70   Pulse: 74   Weight: 97.4 kg (214 lb 12.8 oz)   Height: 162.6 cm (64\")     Body mass index is 36.87 kg/m².        Assessment:       Diagnosis Plan   1. Atrial fibrillation, persistent     2. Paroxysmal SVT (supraventricular tachycardia) (CMS/HCC)     3. Essential hypertension     4. Diastolic dysfunction     5. Pacemaker     6. HUGO (obstructive sleep apnea)           Plan:     1. Persistent atrial fibrillation with hx paroxysmal SVT: On warfarin.  Denies falls or bleeding.  INRs managed by  clinic.   2. Pacemaker: continue with routine device checks. Scheduled for remote check today.   3. Hypertension: controlled.  4. History diastolic dysfunction: without symptoms of heart failure.  5. Dilated ascending aorta measuring 4.0cm  6. Pulmonary hypertension: continue with BP control.  Asymptomatic.  Likely from untreated " HUGO.  7. Untreated HUGO: intolerant of bipap.   8. Hx hemorrhoids and constipation: follows Dr. English.  Denies any bleeding at this time.  Uses suppositories as needed for constipation.     Patient to follow-up with Dr. Culp in 6 months or sooner if needed for any new, recurrent, or worsening symptoms or other problems/ concerns.    This patient has consented to a telehealth visit via phone. The visit was scheduled as a telehealth phone visit to comply with patient safety concerns in accordance with CDC recommendations.  All vitals recorded within this visit are reported by the patient.  I spent 9 minutes in direct conversation with this patient.            Your medication list           Accurate as of April 17, 2020 10:02 AM. If you have any questions, ask your nurse or doctor.               CONTINUE taking these medications      Instructions Last Dose Given Next Dose Due   albuterol sulfate  (90 Base) MCG/ACT inhaler  Commonly known as:  PROVENTIL HFA;VENTOLIN HFA;PROAIR HFA      Inhale 2 puffs Every 6 (Six) Hours As Needed for Wheezing.       calcium carbonate-cholecalciferol 500-400 MG-UNIT tablet tablet      Take 1 tablet by mouth Daily.       furosemide 20 MG tablet  Commonly known as:  LASIX      TAKE 2 TABLETS IN THE MORNING  AND TAKE 1 TABLET EVERY EVENING       gabapentin 300 MG capsule  Commonly known as:  NEURONTIN      TAKE 1 CAPSULE FOUR TIMES DAILY       hydrocortisone 25 MG suppository  Commonly known as:  ANUSOL-HC      Insert 1 suppository into the rectum At Night As Needed for Hemorrhoids (rectal discomfort/bleeding).       meclizine 25 MG tablet  Commonly known as:  ANTIVERT      Take 1 tablet by mouth 3 (Three) Times a Day As Needed for Dizziness.       mesalamine 0.375 g 24 hr capsule  Commonly known as:  APRISO      TAKE 4 CAPSULES DAILY       omeprazole 20 MG capsule  Commonly known as:  priLOSEC      TAKE 1 CAPSULE EVERY DAY       ondansetron 4 MG tablet  Commonly known as:  Zofran       Take 1 tablet by mouth Every 6 (Six) Hours As Needed for Nausea or Vomiting.       PAIN RELIEVER PO      Take 1 tablet by mouth As Needed.       vitamin B-12 1000 MCG tablet  Commonly known as:  CYANOCOBALAMIN      Take 1,000 mcg by mouth Daily.       warfarin 5 MG tablet  Commonly known as:  COUMADIN      TAKE 1/2 TABLET  ON  SATURDAYS  AND TAKE 1 TABLET ALL OTHER DAYS OR AS DIRECTED          STOP taking these medications    HYDROcodone-acetaminophen 5-325 MG per tablet  Commonly known as:  NORCO  Stopped by:  Tereza Jackson DNP, APRN               I did not stop or change the above medications.  Patient's medication list was updated to reflect medications they are currently taking including medication changes made by other providers.          Thanks,    Tereza Jackson DNP, APRN  04/17/2020           Dictated utilizing Dragon dictation

## 2020-04-20 ENCOUNTER — ANTICOAGULATION VISIT (OUTPATIENT)
Dept: PHARMACY | Facility: HOSPITAL | Age: 85
End: 2020-04-20

## 2020-04-20 DIAGNOSIS — I48.19 ATRIAL FIBRILLATION, PERSISTENT (HCC): ICD-10-CM

## 2020-04-20 LAB
INR PPP: 2.8 (ref 0.91–1.09)
PROTHROMBIN TIME: 33.5 SECONDS (ref 10–13.8)

## 2020-04-20 PROCEDURE — G0463 HOSPITAL OUTPT CLINIC VISIT: HCPCS

## 2020-04-20 PROCEDURE — 85610 PROTHROMBIN TIME: CPT

## 2020-04-20 PROCEDURE — 36416 COLLJ CAPILLARY BLOOD SPEC: CPT

## 2020-04-20 NOTE — PROGRESS NOTES
"Anticoagulation Clinic Progress Note    Anticoagulation Summary  As of 2020    INR goal:   2.0-3.0   TTR:   75.6 % (1.5 y)   INR used for dosin.8 (2020)   Warfarin maintenance plan:   2.5 mg every Sat; 5 mg all other days   Weekly warfarin total:   32.5 mg   No change documented:   Rusty Interiano RPH   Plan last modified:   Rusty Interiano RPH (2019)   Next INR check:   2020   Priority:   Maintenance   Target end date:   Indefinite    Indications    Atrial fibrillation  persistent [I48.19]             Anticoagulation Episode Summary     INR check location:       Preferred lab:       Send INR reminders to:    [facility]    Comments:         Anticoagulation Care Providers     Provider Role Specialty Phone number    Mary Grace Culp MD Referring Cardiology [phone]          Clinic Interview:  Patient Findings     Positives:   Signs/symptoms of bleeding    Negatives:   Signs/symptoms of thrombosis, Laboratory test error   suspected, Change in health, Change in alcohol use, Change in activity,   Upcoming invasive procedure, Emergency department visit, Upcoming dental   procedure, Missed doses, Extra doses, Change in medications, Change in   diet/appetite, Hospital admission, Bruising, Other complaints    Comments:   Reports some blood (\"gooey, maroon\", \"bright red blood on   toilet paper\") in morning with BMs since 3/11/20; h/o hemorrhoids/colitis;   pt spoke with Dr. English 2 wks ago, who advised use of the suppositories   that had been prescribed. Pt agreeable to contacting Dr. English again   today to update on current, persistent symptoms.       Clinical Outcomes     Negatives:   Major bleeding event, Thromboembolic event,   Anticoagulation-related hospital admission, Anticoagulation-related ED   visit, Anticoagulation-related fatality    Comments:   Reports some blood (\"gooey, maroon\", \"bright red blood on   toilet paper\") in morning with BMs since 3/11/20; h/o " hemorrhoids/colitis;   pt spoke with Dr. English 2 wks ago, who advised use of the suppositories   that had been prescribed. Pt agreeable to contacting Dr. English again   today to update on current, persistent symptoms.         INR History:  Anticoagulation Monitoring 2/4/2020 3/9/2020 4/20/2020   INR 2.9 2.6 2.8   INR Date 2/4/2020 3/9/2020 4/20/2020   INR Goal 2.0-3.0 2.0-3.0 2.0-3.0   Trend Same Same Same   Last Week Total 32.5 mg 32.5 mg 32.5 mg   Next Week Total 32.5 mg 32.5 mg 32.5 mg   Sun 5 mg 5 mg 5 mg   Mon 5 mg 5 mg 5 mg   Tue 5 mg 5 mg 5 mg   Wed 5 mg 5 mg 5 mg   Thu 5 mg 5 mg 5 mg   Fri 5 mg 5 mg 5 mg   Sat 2.5 mg 2.5 mg 2.5 mg   Visit Report - - -   Some recent data might be hidden       Plan:  1. INR is Therapeutic today- see above in Anticoagulation Summary.  Will instruct Brittany Villeda to Continue their warfarin regimen- see above in Anticoagulation Summary.  2. Agreeable to call Dr. English to discuss GI bleeding w/ h/o colitis/hemorrhoids  3. Follow up in 2 weeks w/ [facility]  4. Patient declines warfarin refills.  5. Verbal and written information provided. To seek immediate medical attention if s/sx of GI bleeding worsen/persist. Patient expresses understanding and has no further questions at this time.    Rusty Interiano [facility]

## 2020-04-21 ENCOUNTER — OFFICE VISIT (OUTPATIENT)
Dept: GASTROENTEROLOGY | Facility: CLINIC | Age: 85
End: 2020-04-21

## 2020-04-21 ENCOUNTER — TELEPHONE (OUTPATIENT)
Dept: GASTROENTEROLOGY | Facility: CLINIC | Age: 85
End: 2020-04-21

## 2020-04-21 VITALS — BODY MASS INDEX: 35.85 KG/M2 | HEIGHT: 64 IN | WEIGHT: 210 LBS

## 2020-04-21 DIAGNOSIS — Z79.01 ANTICOAGULATED ON WARFARIN: ICD-10-CM

## 2020-04-21 DIAGNOSIS — K62.5 RECTAL BLEEDING: Primary | ICD-10-CM

## 2020-04-21 DIAGNOSIS — K51.30 ULCERATIVE RECTOSIGMOIDITIS WITHOUT COMPLICATION (HCC): ICD-10-CM

## 2020-04-21 PROCEDURE — 99442 PR PHYS/QHP TELEPHONE EVALUATION 11-20 MIN: CPT | Performed by: INTERNAL MEDICINE

## 2020-04-21 NOTE — PROGRESS NOTES
Subjective   Chief Complaint   Patient presents with   • Rectal Bleeding       Brittany Villeda is a  85 y.o. female here for a follow up visit for rectal bleeding.     Patient has consented to proceed with a telehealth (telephone or telemedicine) visit in lieu of an office visit given the coronavirus epidemic.    Patient reports that the rectal bleeding is continued.  She called in early March complaining of rectal bleeding that occurred with her first morning bowel movement.  She described as dark small chunks of blood.  Then she would have a second bowel movement around 10 AM with bright red blood.  This is persisted despite using hydrocortisone at least once daily.  She was using it up to 3 times daily some days.  Her Coumadin levels have been checked and have been in the normal range.  Her stools are not hard.  She is had no constipation.  Stools have been fairly soft.  She feels okay otherwise.  She does complain of some pressure in her pelvis at times but no other abdominal pain.  She takes Apriso 1.5 g/day.  HPI  Past Medical History:   Diagnosis Date   • Anemia    • Arrhythmia    • Arthritis    • Asthma    • Bradycardia    • Chest pain    • Colitis    • COPD (chronic obstructive pulmonary disease) (CMS/HCC)    • Diastolic dysfunction    • Essential hypertension 5/12/2016   • GERD (gastroesophageal reflux disease)    • Heart block    • Hypertension    • Hypertensive heart disease    • Hyperthyroidism    • Kidney stone    • Leukopenia    • Low back pain    • MGUS (monoclonal gammopathy of unknown significance)    • Nephrolithiasis    • Obesity    • Paroxysmal atrial fibrillation (CMS/HCC)    • Peptic ulceration    • PSVT (paroxysmal supraventricular tachycardia) (CMS/HCC)    • Pulmonary hypertension (CMS/HCC)    • Rectal bleed    • Sleep apnea    • Trifascicular block    • 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)    • Ventricular tachycardia (CMS/HCC)     nonsustained   • Vertigo       Past Surgical History:   Procedure Laterality Date   • BACK SURGERY      lumbar fusion   • CARDIAC ELECTROPHYSIOLOGY PROCEDURE N/A 11/7/2018    Procedure: Pacemaker DC new   [place];  Surgeon: Jesus Granda MD;  Location: [facility] CATH INVASIVE LOCATION;  Service: Cardiology   • CHOLECYSTECTOMY     • COLONOSCOPY  06/16/2014    colitis, cryptitis,  tics, NBIH, TA w/low grade dysplasia   • COLONOSCOPY N/A 10/28/2019    Procedure: COLONOSCOPY WITH COLD AND HOT POLYPECTOMIES;  Surgeon: Micaela English MD;  Location: [facility] ENDOSCOPY;  Service: Gastroenterology   • HEMORRHOIDECTOMY     • HYSTERECTOMY     • KNEE ARTHROPLASTY     • MYOMECTOMY     • SHOULDER SURGERY     • SINUS SURGERY     • TOE NAIL AMPUTATION  03/04/2019   • TONSILLECTOMY         Current Outpatient Medications:   •  Acetaminophen (PAIN RELIEVER PO), Take 1 tablet by mouth As Needed., Disp: , Rfl:   •  albuterol sulfate  (90 Base) MCG/ACT inhaler, Inhale 2 puffs Every 6 (Six) Hours As Needed for Wheezing., Disp: 1 inhaler, Rfl: 0  •  calcium carbonate-cholecalciferol 500-400 MG-UNIT tablet tablet, Take 1 tablet by mouth Daily., Disp: , Rfl:   •  furosemide (LASIX) 20 MG tablet, TAKE 2 TABLETS IN THE MORNING  AND TAKE 1 TABLET EVERY EVENING, Disp: 270 tablet, Rfl: 1  •  gabapentin (NEURONTIN) 300 MG capsule, TAKE 1 CAPSULE FOUR TIMES DAILY, Disp: 360 capsule, Rfl: 1  •  hydrocortisone (ANUSOL-HC) 25 MG suppository, Insert 1 suppository into the rectum At Night As Needed for Hemorrhoids (rectal discomfort/bleeding)., Disp: 30 suppository, Rfl: 3  •  meclizine (ANTIVERT) 25 MG tablet, Take 1 tablet by mouth 3 (Three) Times a Day As Needed for Dizziness., Disp: 12 tablet, Rfl: 0  •  mesalamine (APRISO) 0.375 g 24 hr capsule, TAKE 4 CAPSULES DAILY, Disp: 360 capsule, Rfl: 1  •  omeprazole (priLOSEC) 20 MG capsule, TAKE 1 CAPSULE EVERY DAY, Disp: 90 capsule, Rfl: 0  •  ondansetron (ZOFRAN) 4 MG tablet, Take 1 tablet by mouth Every 6 (Six)  Hours As Needed for Nausea or Vomiting., Disp: 10 tablet, Rfl: 0  •  vitamin B-12 (CYANOCOBALAMIN) 1000 MCG tablet, Take 1,000 mcg by mouth Daily., Disp: , Rfl:   •  warfarin (COUMADIN) 5 MG tablet, TAKE 1/2 TABLET  ON    AND TAKE 1 TABLET ALL OTHER DAYS OR AS DIRECTED, Disp: 85 tablet, Rfl: 0  PRN Meds:.  Allergies   Allergen Reactions   • Codeine Hallucinations   • Amitriptyline Rash   • Amoxicillin-Pot Clavulanate Rash   • Aspirin Unknown - Low Severity     Patient doesn't know why   • Bactrim [Sulfamethoxazole-Trimethoprim] Rash   • Carisoprodol-Aspirin-Codeine Palpitations   • Iodinated Diagnostic Agents Rash   • Latex Rash   • Naproxen Rash   • Nsaids Unknown - Low Severity     unkknown   • Soma Compound With Codeine [Carisoprodol-Aspirin-Codeine] Rash   • Sulfa Antibiotics Rash   • Tramadol Palpitations     heart races      Social History     Socioeconomic History   • Marital status:      Spouse name: Not on file   • Number of children: 10   • Years of education: High School   • Highest education level: Not on file   Occupational History   • Occupation: Caregiver     Employer: RETIRED     Comment: worked for [employer]   Tobacco Use   • Smoking status: Former Smoker     Packs/day: 1.50     Years: 10.00     Pack years: 15.00     Start date:      Last attempt to quit:      Years since quittin.3   • Smokeless tobacco: Never Used   • Tobacco comment: QUIT SMOKING    Substance and Sexual Activity   • Alcohol use: No     Comment: Daily caffeine use - one cup of coffee   • Drug use: Defer   • Sexual activity: Defer     Family History   Problem Relation Age of Onset   • Diabetes Mother    • Breast cancer Sister    • Kidney cancer Sister    • Heart disease Sister    • Prostate cancer Brother    • Prostate cancer Brother    • Prostate cancer Brother    • Malig Hyperthermia Neg Hx      Review of Systems   Constitutional: Negative for appetite change and unexpected weight  change.   Gastrointestinal: Positive for blood in stool. Negative for abdominal pain, constipation and diarrhea.   All other systems reviewed and are negative.    There were no vitals filed for this visit.      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  · She is having home health come to check her INR in 2 weeks-we will try to arrange to have them also check her CBC and her iron levels to see if she need supplementation.    · If we cannot get Uceris foam approved may have to consider a trial of steroids      Time of call is 11 minutes

## 2020-04-21 NOTE — TELEPHONE ENCOUNTER
Call to pt.  States DR Culp' office has arranged for lab to come to her home for draw.      Call to Dr Culp' office - not open.  Will try back tomorrow.    [facility]    1/1/2020 10:41 AM        Name: Marcelo Stokes . Admitted: 12/29/2019  Primary Care Provider: Dora Anne MD (Tel: [phone])       Subjective:    Sitting in bed sob has improved but still on on 2L o2, still slow to answer question per father in law is not baseline  Reviewed interval ancillary notes    Current Medications  amiodarone (CORDARONE) tablet 200 mg, Daily  metronidazole (FLAGYL) 500 mg in NaCl 100 mL IVPB premix, Q8H  potassium chloride (KLOR-CON M) extended release tablet 40 mEq, PRN    Or  potassium bicarb-citric acid (EFFER-K) effervescent tablet 40 mEq, PRN    Or  potassium chloride 10 mEq/100 mL IVPB (Peripheral Line), PRN  potassium chloride 20 mEq/50 mL IVPB (Central Line), PRN  sodium chloride flush 0.9 % injection 10 mL, 2 times per day  sodium chloride flush 0.9 % injection 10 mL, PRN  ondansetron (ZOFRAN) injection 4 mg, Q6H PRN  digoxin (LANOXIN) tablet 125 mcg, Daily  sodium chloride flush 0.9 % injection 10 mL, 2 times per day  sodium chloride flush 0.9 % injection 10 mL, PRN  magnesium sulfate 1 g in dextrose 5% 100 mL IVPB, PRN  pantoprazole (PROTONIX) injection 40 mg, Daily  linezolid (ZYVOX) IVPB 600 mg, Q12H  milrinone (PRIMACOR) 20 mg in dextrose 5 % 100 mL infusion, Continuous  carvedilol (COREG) tablet 3.125 mg, BID WC        Objective:  /77   Pulse 97   Temp 97.1 °F (36.2 °C) (Temporal)   Resp 12   Ht 5' 10\" (1.778 m)   Wt 198 lb 3.1 oz (89.9 kg)   SpO2 97%   BMI 28.44 kg/m²     Intake/Output Summary (Last 24 hours) at 1/1/2020 1041  Last data filed at 1/1/2020 0546  Gross per 24 hour   Intake 2919.7 ml   Output 1590 ml   Net 1329.7 ml      Wt Readings from Last 3 Encounters:   01/01/20 198 lb 3.1 oz (89.9 kg)   12/23/19 192 lb 9.6 oz (87.4 kg)   12/18/19 194 lb 11.2 oz (88.3 kg)       General appearance:  Appears comfortable.  AAOx3 but with no obstruction    Ischemic hepatitis: imaging negative, lfts improving  Coagulopathy:  S/p vit k improvied no bleeding    Acute encephalopathy: stillquite slow to answer questions, will get neuro input, may be degress of under perfusion from weak heart    ?  C diff + 12/21: continue iv flagly, wbc inmproving  - c diff isolation  - still on linozolid per id, f/u cultures       Diet: Dietary Nutrition Supplements: Standard High Calorie Oral Supplement  DIET LOW SODIUM 2 GM;  Dietary Nutrition Supplements: Standard High Calorie Oral Supplement  Code:Full Code  DVT PPXscd no ac given coagulpathy  Disposition 2-3 days,       Connie Cali MD   1/1/2020 10:41 AM

## 2020-04-21 NOTE — TELEPHONE ENCOUNTER
----- Message from Micaela English MD sent at 4/21/2020  4:06 PM EDT -----  Regarding: add labs to  labs  Patient is scheduled to have her INR drawn at home by [facility] in 2 weeks-can we contact them to see if they could also draw a CBC and a ferritin level?

## 2020-04-22 NOTE — TELEPHONE ENCOUNTER
Call from Sigifredo with [employer].  States will fax requisition to this office - this will expedite appropriate processing.

## 2020-04-22 NOTE — TELEPHONE ENCOUNTER
Call to [facility] and spoke with Sigifredo.  Request add cbc and ferritin to labs being done on 5/4.     Sigifredo states will add - watch for results back to DR Culp.

## 2020-04-22 NOTE — TELEPHONE ENCOUNTER
Call to DR Culp' office @ [phone] - FD left for Mariola requesting info for [facility].  Awaiting reply.

## 2020-04-22 NOTE — TELEPHONE ENCOUNTER
Phone Number for [facility]: [phone]. If you all need, you can enter labs needed into pt chart and we can pull off a copy and send to the lab. Thanks!

## 2020-04-24 NOTE — TELEPHONE ENCOUNTER
Call to [employer] and spoke with Gillian.  Fax not received.  Per instructions - refaxed.  Receipt confirmed.

## 2020-05-04 LAB — INR PPP: 2.57

## 2020-05-05 ENCOUNTER — ANTICOAGULATION VISIT (OUTPATIENT)
Dept: PHARMACY | Facility: HOSPITAL | Age: 85
End: 2020-05-05

## 2020-05-05 DIAGNOSIS — I48.19 ATRIAL FIBRILLATION, PERSISTENT (HCC): ICD-10-CM

## 2020-05-05 NOTE — PROGRESS NOTES
Anticoagulation Clinic Progress Note    Anticoagulation Summary  As of 2020    INR goal:   2.0-3.0   TTR:   76.1 % (1.6 y)   INR used for dosin.57 (2020)   Warfarin maintenance plan:   2.5 mg every Sat; 5 mg all other days   Weekly warfarin total:   32.5 mg   No change documented:   Rusty Interiano RPH   Plan last modified:   Rusty Interiano RPH (2019)   Next INR check:   6/3/2020   Priority:   Maintenance   Target end date:   Indefinite    Indications    Atrial fibrillation  persistent [I48.19]             Anticoagulation Episode Summary     INR check location:       Preferred lab:       Send INR reminders to:    [facility]    Comments:         Anticoagulation Care Providers     Provider Role Specialty Phone number    Mary Grace Culp MD Referring Cardiology [phone]          Clinic Interview:  Patient Findings     Negatives:   Signs/symptoms of thrombosis, Signs/symptoms of bleeding,   Laboratory test error suspected, Change in health, Change in alcohol use,   Change in activity, Upcoming invasive procedure, Emergency department   visit, Upcoming dental procedure, Missed doses, Extra doses, Change in   medications, Change in diet/appetite, Hospital admission, Bruising, Other   complaints      Clinical Outcomes     Negatives:   Major bleeding event, Thromboembolic event,   Anticoagulation-related hospital admission, Anticoagulation-related ED   visit, Anticoagulation-related fatality        INR History:  Anticoagulation Monitoring 3/9/2020 2020 2020   INR 2.6 2.8 2.57   INR Date 3/9/2020 2020 2020   INR Goal 2.0-3.0 2.0-3.0 2.0-3.0   Trend Same Same Same   Last Week Total 32.5 mg 32.5 mg 32.5 mg   Next Week Total 32.5 mg 32.5 mg 32.5 mg   Sun 5 mg 5 mg 5 mg   Mon 5 mg 5 mg 5 mg   Tue 5 mg 5 mg 5 mg   Wed 5 mg 5 mg 5 mg   Thu 5 mg 5 mg 5 mg   Fri 5 mg 5 mg 5 mg   Sat 2.5 mg 2.5 mg 2.5 mg   Visit Report - - -   Some recent data might be hidden       Plan:  1. INR is  Therapeutic today- see above in Anticoagulation Summary.   Will instruct Brittany Villeda to Continue their warfarin regimen- see above in Anticoagulation Summary.  2. Follow up in 4 weeks w/ [facility].   3. They have been instructed to call if any changes in medications, doses, concerns, etc. Patient expresses understanding and has no further questions at this time.    Rusty Interiano RP

## 2020-05-19 ENCOUNTER — TELEPHONE (OUTPATIENT)
Dept: CARDIOLOGY | Facility: CLINIC | Age: 85
End: 2020-05-19

## 2020-05-19 RX ORDER — OMEPRAZOLE 20 MG/1
CAPSULE, DELAYED RELEASE ORAL
Qty: 90 CAPSULE | Refills: 0 | Status: SHIPPED | OUTPATIENT
Start: 2020-05-19 | End: 2020-07-24

## 2020-05-19 NOTE — TELEPHONE ENCOUNTER
Spoke with pt.  Verbalized understanding.  No questions.  She will contact Dr Esparza to see if any further workup is needed.  (done)

## 2020-05-19 NOTE — TELEPHONE ENCOUNTER
Blood work dated 5/7/2020 reviewed with hemoglobin of 12.4.  Platelet count normal.  White count slightly low.  Please have the patient follow-up with PCP regarding low white count

## 2020-05-20 ENCOUNTER — TELEPHONE (OUTPATIENT)
Dept: FAMILY MEDICINE CLINIC | Facility: CLINIC | Age: 85
End: 2020-05-20

## 2020-05-20 NOTE — TELEPHONE ENCOUNTER
Please call patient.  No further action needed at this time.  Make sure she follows up with Dr. Valadez, her hematologist.  They are aware of the low white blood cell count as is she.

## 2020-05-20 NOTE — TELEPHONE ENCOUNTER
Pt called back and she said that Dr Valadez has been watching the WBC and she is always low.     JUST ANAHI.

## 2020-05-20 NOTE — TELEPHONE ENCOUNTER
PATIENT CALLED STATING THAT SHE HAD RECEIVED A CALL FROM DR. PAZ OFFICE REGARDING HER LAB RESULTS. SHE STATES THAT THEY TOLD HER TO CALL THE OFFICE TO GET THOSE RESULTS. SHE CAN BE REACHED [phone].

## 2020-06-01 RX ORDER — WARFARIN SODIUM 5 MG/1
TABLET ORAL
Qty: 85 TABLET | Refills: 1 | Status: SHIPPED | OUTPATIENT
Start: 2020-06-01 | End: 2020-10-29

## 2020-06-03 LAB — INR PPP: 2.68

## 2020-06-04 ENCOUNTER — ANTICOAGULATION VISIT (OUTPATIENT)
Dept: PHARMACY | Facility: HOSPITAL | Age: 85
End: 2020-06-04

## 2020-06-04 DIAGNOSIS — I48.19 ATRIAL FIBRILLATION, PERSISTENT (HCC): ICD-10-CM

## 2020-06-04 NOTE — PROGRESS NOTES
Anticoagulation Clinic Progress Note    Anticoagulation Summary  As of 2020    INR goal:   2.0-3.0   TTR:   77.3 % (1.7 y)   INR used for dosin.68 (6/3/2020)   Warfarin maintenance plan:   2.5 mg every Sat; 5 mg all other days   Weekly warfarin total:   32.5 mg   Plan last modified:   Rusty Interiano [facility] (2019)   Next INR check:   2020   Priority:   Maintenance   Target end date:   Indefinite    Indications    Atrial fibrillation  persistent [I48.19]             Anticoagulation Episode Summary     INR check location:       Preferred lab:       Send INR reminders to:    [facility]    Comments:         Anticoagulation Care Providers     Provider Role Specialty Phone number    Mary Grace Culp MD Referring Cardiology [phone]          Clinic Interview:      INR History:  Anticoagulation Monitoring 2020   INR 2.8 2.57 2.68   INR Date 2020 2020 6/3/2020   INR Goal 2.0-3.0 2.0-3.0 2.0-3.0   Trend Same Same Same   Last Week Total 32.5 mg 32.5 mg 32.5 mg   Next Week Total 32.5 mg 32.5 mg 32.5 mg   Sun 5 mg 5 mg 5 mg   Mon 5 mg 5 mg 5 mg   Tue 5 mg 5 mg 5 mg   Wed 5 mg 5 mg 5 mg   Thu 5 mg 5 mg 5 mg   Fri 5 mg 5 mg 5 mg   Sat 2.5 mg 2.5 mg 2.5 mg   Visit Report - - -   Some recent data might be hidden       Plan:  1. INR is Therapeutic today- see above in Anticoagulation Summary.   Will instruct Brittany Villeda to Continue their warfarin regimen- see above in Anticoagulation Summary.  2. Follow up in 4 weeks  3. Spoke with pt today. They have been instructed to call if any changes in medications, doses, concerns, etc. Patient expresses understanding and has no further questions at this time.    Yasmeen Pichardo [facility]

## 2020-06-17 ENCOUNTER — TELEPHONE (OUTPATIENT)
Dept: FAMILY MEDICINE CLINIC | Facility: CLINIC | Age: 85
End: 2020-06-17

## 2020-06-17 NOTE — TELEPHONE ENCOUNTER
Patient called requesting refills for HYDROCODONE.     Patient callback  [phone]    Pharmacy confirmed [facility] AT [facility] AND [address]

## 2020-06-19 ENCOUNTER — CLINICAL SUPPORT (OUTPATIENT)
Dept: ORTHOPEDIC SURGERY | Facility: CLINIC | Age: 85
End: 2020-06-19

## 2020-06-19 ENCOUNTER — OFFICE VISIT (OUTPATIENT)
Dept: FAMILY MEDICINE CLINIC | Facility: CLINIC | Age: 85
End: 2020-06-19

## 2020-06-19 VITALS
OXYGEN SATURATION: 98 % | HEART RATE: 70 BPM | WEIGHT: 218 LBS | DIASTOLIC BLOOD PRESSURE: 68 MMHG | SYSTOLIC BLOOD PRESSURE: 130 MMHG | TEMPERATURE: 97.3 F | BODY MASS INDEX: 37.22 KG/M2 | HEIGHT: 64 IN

## 2020-06-19 VITALS — TEMPERATURE: 97.6 F | HEIGHT: 64 IN | WEIGHT: 218 LBS | BODY MASS INDEX: 37.22 KG/M2

## 2020-06-19 DIAGNOSIS — M19.90 ARTHRITIS: ICD-10-CM

## 2020-06-19 DIAGNOSIS — M54.41 CHRONIC MIDLINE LOW BACK PAIN WITH RIGHT-SIDED SCIATICA: ICD-10-CM

## 2020-06-19 DIAGNOSIS — M19.019 SHOULDER ARTHRITIS: Primary | ICD-10-CM

## 2020-06-19 DIAGNOSIS — G89.29 CHRONIC MIDLINE LOW BACK PAIN WITH RIGHT-SIDED SCIATICA: ICD-10-CM

## 2020-06-19 DIAGNOSIS — M25.511 CHRONIC RIGHT SHOULDER PAIN: Primary | ICD-10-CM

## 2020-06-19 DIAGNOSIS — K51.30 ULCERATIVE RECTOSIGMOIDITIS WITHOUT COMPLICATION (HCC): ICD-10-CM

## 2020-06-19 DIAGNOSIS — I10 ESSENTIAL HYPERTENSION: ICD-10-CM

## 2020-06-19 DIAGNOSIS — G89.29 CHRONIC RIGHT SHOULDER PAIN: Primary | ICD-10-CM

## 2020-06-19 DIAGNOSIS — I48.19 ATRIAL FIBRILLATION, PERSISTENT (HCC): ICD-10-CM

## 2020-06-19 PROCEDURE — 73030 X-RAY EXAM OF SHOULDER: CPT | Performed by: ORTHOPAEDIC SURGERY

## 2020-06-19 PROCEDURE — 99214 OFFICE O/P EST MOD 30 MIN: CPT | Performed by: FAMILY MEDICINE

## 2020-06-19 PROCEDURE — 20610 DRAIN/INJ JOINT/BURSA W/O US: CPT | Performed by: ORTHOPAEDIC SURGERY

## 2020-06-19 RX ORDER — GABAPENTIN 300 MG/1
300 CAPSULE ORAL 2 TIMES DAILY
Qty: 180 CAPSULE | Refills: 1
Start: 2020-06-19 | End: 2020-12-09

## 2020-06-19 RX ORDER — METHYLPREDNISOLONE ACETATE 80 MG/ML
80 INJECTION, SUSPENSION INTRA-ARTICULAR; INTRALESIONAL; INTRAMUSCULAR; SOFT TISSUE
Status: COMPLETED | OUTPATIENT
Start: 2020-06-19 | End: 2020-06-19

## 2020-06-19 RX ORDER — HYDROCODONE BITARTRATE AND ACETAMINOPHEN 5; 325 MG/1; MG/1
TABLET ORAL
Qty: 20 TABLET | Refills: 0 | Status: SHIPPED | OUTPATIENT
Start: 2020-06-19 | End: 2020-11-24 | Stop reason: HOSPADM

## 2020-06-19 RX ADMIN — METHYLPREDNISOLONE ACETATE 80 MG: 80 INJECTION, SUSPENSION INTRA-ARTICULAR; INTRALESIONAL; INTRAMUSCULAR; SOFT TISSUE at 07:45

## 2020-06-19 NOTE — PROGRESS NOTES
"Subjective   Brittany Villeda is a 85 y.o. female.     Chief Complaint   Patient presents with   • Med Refill     3 mth f/u for Controlled Sub.        History of Present Illness    Shoulder arthritis.  Chronic midline low back pain with right-sided sciatica.  Gabapentin continues to 300 mg twice a day.  No sedation.  She states it helps.  She also takes a very rare hydrocodone tablet.  I gave her 20 a number of months ago and she just ran out.  She also saw orthopedic surgeon this morning and had some steroid shots in her shoulders.  No trouble with either medication.  No sedation.  No near falls.  No constipation.  She states the hydrocodone does help.  She uses it for more severe pain only.  She is unable to take NSAIDs because of her medications, see below.    Ulcerative rectosigmoid colitis.  She has had some recent rectal bleeding but her gastroenterologist states it is related to hemorrhoids.  She is using suppositories and that is much better.    Persistent atrial fibrillation.  She continues on warfarin.  She does not need rate control.  Her heart rate has been in the normal range without beta-blockade.    Hypertension.  Currently taking furosemide 40 the morning and 20 in the evening.  Her potassium was slightly low a few months ago.      The following portions of the patient's history were reviewed and updated as appropriate: allergies, current medications, past family history, past medical history, past social history, past surgical history and problem list.          Review of Systems   Constitutional: Negative.    Respiratory: Negative.    Cardiovascular: Negative for palpitations and leg swelling.   Musculoskeletal: Positive for back pain.   Neurological: Negative.    Psychiatric/Behavioral: Negative.        Objective   Blood pressure 130/68, pulse 70, temperature 97.3 °F (36.3 °C), temperature source Temporal, height 162.6 cm (64\"), weight 98.9 kg (218 lb), SpO2 98 %.  Physical Exam "   Constitutional: She appears well-developed and well-nourished. No distress.   Neck: No thyromegaly present.   Cardiovascular: Normal rate, regular rhythm, normal heart sounds and intact distal pulses.   Appears to be in sinus rhythm today.   Pulmonary/Chest: Effort normal and breath sounds normal.   Musculoskeletal: She exhibits no edema.   Skin: Skin is warm and dry.   Psychiatric: She has a normal mood and affect. Her behavior is normal. Judgment and thought content normal.   Nursing note and vitals reviewed.      Assessment/Plan   Brittany was seen today for med refill.    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    Essential hypertension  -     Comprehensive Metabolic Panel      Shoulder arthritis.  Chronic low back pain with right-sided sciatica.  Continue gabapentin.  Continue very minimal hydrocodone use.  20 tablets has lasted her about 5 months.  Refill given.  She is had no constipation or otherwise adverse effects.  I will see her back within 6 months for annual wellness visit and recheck.    Ulcerative colitis.  She continues to follow with her gastroenterologist.    Atrial fibrillation.  No clinical evidence today.  I believe she is in sinus rhythm today.  I did not do a EKG.  She continues warfarin.  Her rate is acceptable without beta-blockers.    Hypertension.  Well-controlled with Lasix.  Potassium was a little bit low a few months ago.  Rechecking today.

## 2020-06-19 NOTE — PROGRESS NOTES
Patient: Brittany Villeda  YOB: 1935  Date of Service: 6/19/2020    Chief Complaints: Right shoulder pain, left shoulder pain  Subjective:    History of Present Illness: Pt is seen in the office today with complaints of bilateral shoulder pain I last saw her in March for injections of her shoulder she has known degenerative changes is not interested in think surgical would like repeat injections.          Allergies:   Allergies   Allergen Reactions   • Codeine Hallucinations   • Amitriptyline Rash   • Amoxicillin-Pot Clavulanate Rash   • Aspirin Unknown - Low Severity     Patient doesn't know why   • Bactrim [Sulfamethoxazole-Trimethoprim] Rash   • Carisoprodol-Aspirin-Codeine Palpitations   • Iodinated Diagnostic Agents Rash   • Latex Rash   • Naproxen Rash   • Nsaids Unknown - Low Severity     unkknown   • Soma Compound With Codeine [Carisoprodol-Aspirin-Codeine] Rash   • Sulfa Antibiotics Rash   • Tramadol Palpitations     heart races        Medications:   Home Medications:  Current Outpatient Medications on File Prior to Visit   Medication Sig   • Acetaminophen (PAIN RELIEVER PO) Take 1 tablet by mouth As Needed.   • albuterol sulfate  (90 Base) MCG/ACT inhaler Inhale 2 puffs Every 6 (Six) Hours As Needed for Wheezing.   • calcium carbonate-cholecalciferol 500-400 MG-UNIT tablet tablet Take 1 tablet by mouth Daily.   • furosemide (LASIX) 20 MG tablet TAKE 2 TABLETS IN THE MORNING  AND TAKE 1 TABLET EVERY EVENING   • gabapentin (NEURONTIN) 300 MG capsule TAKE 1 CAPSULE FOUR TIMES DAILY   • hydrocortisone (ANUSOL-HC) 25 MG suppository Insert 1 suppository into the rectum At Night As Needed for Hemorrhoids (rectal discomfort/bleeding).   • meclizine (ANTIVERT) 25 MG tablet Take 1 tablet by mouth 3 (Three) Times a Day As Needed for Dizziness.   • mesalamine (APRISO) 0.375 g 24 hr capsule TAKE 4 CAPSULES DAILY   • omeprazole (priLOSEC) 20 MG capsule TAKE 1 CAPSULE EVERY DAY   • ondansetron  (ZOFRAN) 4 MG tablet Take 1 tablet by mouth Every 6 (Six) Hours As Needed for Nausea or Vomiting.   • vitamin B-12 (CYANOCOBALAMIN) 1000 MCG tablet Take 1,000 mcg by mouth Daily.   • warfarin (COUMADIN) 5 MG tablet Take one-half tablet (2.5 mg) by mouth on Sat, and take one tablet (5 mg) by mouth all other days or as directed.     No current facility-administered medications on file prior to visit.      Current Medications:  Scheduled Meds:  Continuous Infusions:  No current facility-administered medications for this visit.   PRN Meds:.    I have reviewed the patient's medical history in detail and updated the computerized patient record.  Review and summarization of old records include:    Past Medical History:   Diagnosis Date   • Anemia    • Arrhythmia    • Arthritis    • Asthma    • Bradycardia    • Chest pain    • Colitis    • COPD (chronic obstructive pulmonary disease) (CMS/HCC)    • Diastolic dysfunction    • Essential hypertension 5/12/2016   • GERD (gastroesophageal reflux disease)    • Heart block    • Hypertension    • Hypertensive heart disease    • Hyperthyroidism    • Kidney stone    • Leukopenia    • Low back pain    • MGUS (monoclonal gammopathy of unknown significance)    • Nephrolithiasis    • Obesity    • Paroxysmal atrial fibrillation (CMS/HCC)    • Peptic ulceration    • PSVT (paroxysmal supraventricular tachycardia) (CMS/HCC)    • Pulmonary hypertension (CMS/HCC)    • Rectal bleed    • Sleep apnea    • Trifascicular block    • 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 INVASIVE LOCATION;  Service: Cardiology   • CHOLECYSTECTOMY     • COLONOSCOPY  06/16/2014     colitis, cryptitis,  tics, NBIH, TA w/low grade dysplasia   • COLONOSCOPY N/A 10/28/2019    Procedure: COLONOSCOPY WITH COLD AND HOT POLYPECTOMIES;  Surgeon: Micaela English MD;  Location: [facility] ENDOSCOPY;  Service: Gastroenterology   • HEMORRHOIDECTOMY     • HYSTERECTOMY     • KNEE ARTHROPLASTY     • MYOMECTOMY     • SHOULDER SURGERY     • SINUS SURGERY     • TOE NAIL AMPUTATION  2019   • TONSILLECTOMY          Social History     Occupational History   • Occupation: Caregiver     Employer: RETIRED     Comment: worked for [employer]   Tobacco Use   • Smoking status: Former Smoker     Packs/day: 1.50     Years: 10.00     Pack years: 15.00     Start date:      Last attempt to quit:      Years since quittin.5   • Smokeless tobacco: Never Used   • Tobacco comment: QUIT SMOKING    Substance and Sexual Activity   • Alcohol use: No     Comment: Daily caffeine use - one cup of coffee   • Drug use: Defer   • Sexual activity: Defer    Social History     Social History Narrative   • Not on file        Family History   Problem Relation Age of Onset   • Diabetes Mother    • Breast cancer Sister    • Kidney cancer Sister    • Heart disease Sister    • Prostate cancer Brother    • Prostate cancer Brother    • Prostate cancer Brother    • Malig Hyperthermia Neg Hx        ROS: 14 point review of systems was performed and was negative except for documented findings in HPI and today's encounter.     Allergies:   Allergies   Allergen Reactions   • Codeine Hallucinations   • Amitriptyline Rash   • Amoxicillin-Pot Clavulanate Rash   • Aspirin Unknown - Low Severity     Patient doesn't know why   • Bactrim [Sulfamethoxazole-Trimethoprim] Rash   • Carisoprodol-Aspirin-Codeine Palpitations   • Iodinated Diagnostic Agents Rash   • Latex Rash   • Naproxen Rash   • Nsaids Unknown - Low Severity     unkknown   • Soma Compound With Codeine [Carisoprodol-Aspirin-Codeine] Rash   • Sulfa Antibiotics Rash   •  Tramadol Palpitations     heart races      Constitutional:  Denies fever, shaking or chills   Eyes:  Denies change in visual acuity   HENT:  Denies nasal congestion or sore throat   Respiratory:  Denies cough or shortness of breath   Cardiovascular:  Denies chest pain or severe LE edema   GI:  Denies abdominal pain, nausea, vomiting, bloody stools or diarrhea   Musculoskeletal:  Numbness, tingling, or loss of motor function only as noted above in history of present illness.  : Denies painful urination or hematuria  Integument:  Denies rash, lesion or ulceration   Neurologic:  Denies headache or focal weakness  Endocrine:  Denies lymphadenopathy  Psych:  Denies confusion or change in mental status   Hem:  Denies active bleeding      Physical Exam: 85 y.o. female  Wt Readings from Last 3 Encounters:   04/21/20 95.3 kg (210 lb)   04/17/20 97.4 kg (214 lb 12.8 oz)   03/13/20 96.2 kg (212 lb 1.6 oz)       There is no height or weight on file to calculate BMI.  No height and weight on file for this encounter.  There were no vitals filed for this visit.  Vital signs reviewed.   General Appearance:    Alert, cooperative, in no acute distress                  Eyes: conjunctiva clear  ENT: external ears and nose atraumatic  CV: no peripheral edema  Resp: normal respiratory effort  Skin: no rashes or wounds; normal turgor  Psych: mood and affect appropriate  Lymph: no nodes appreciated  Neuro: gross sensation intact  Vascular:  Palpable peripheral pulse in noted extremity    Ortho exam  Exam is unchanged    X-rays AP scapular on x-ray lateral the right shoulder were taken to evaluate her symptoms and compared to previous films she does have degenerative changes with narrowing the joint space and osteophyte formation               Assessment: Bilateral shoulder DJD    Plan: Injections  Follow up as indicated.  Ice, elevate, and rest as needed.  Discussed conservative measures of pain control including ice, bracing.  Also  talked about the importance of strengthening Hyacinth Lovell M.D.      Large Joint Arthrocentesis: L glenohumeral  Date/Time: 6/19/2020 7:45 AM  Consent given by: patient  Site marked: site marked  Timeout: Immediately prior to procedure a time out was called to verify the correct patient, procedure, equipment, support staff and site/side marked as required   Supporting Documentation  Indications: pain   Procedure Details  Location: shoulder - L glenohumeral  Preparation: Patient was prepped and draped in the usual sterile fashion  Needle gauge: 21.  Approach: posterior  Medications administered: 80 mg methylPREDNISolone acetate 80 MG/ML; 4 mL lidocaine (cardiac)  Patient tolerance: patient tolerated the procedure well with no immediate complications    Large Joint Arthrocentesis: R glenohumeral  Date/Time: 6/19/2020 7:45 AM  Consent given by: patient  Site marked: site marked  Timeout: Immediately prior to procedure a time out was called to verify the correct patient, procedure, equipment, support staff and site/side marked as required   Supporting Documentation  Indications: pain   Procedure Details  Location: shoulder - R glenohumeral  Preparation: Patient was prepped and draped in the usual sterile fashion  Needle gauge: 21.  Approach: posterior  Medications administered: 4 mL lidocaine (cardiac); 80 mg methylPREDNISolone acetate 80 MG/ML  Patient tolerance: patient tolerated the procedure well with no immediate complications

## 2020-06-20 LAB
ALBUMIN SERPL-MCNC: 4.2 G/DL (ref 3.5–5.2)
ALBUMIN/GLOB SERPL: 1.2 G/DL
ALP SERPL-CCNC: 67 U/L (ref 39–117)
ALT SERPL-CCNC: <5 U/L (ref 1–33)
AST SERPL-CCNC: 11 U/L (ref 1–32)
BILIRUB SERPL-MCNC: 1.2 MG/DL (ref 0.2–1.2)
BUN SERPL-MCNC: 15 MG/DL (ref 8–23)
BUN/CREAT SERPL: 13.9 (ref 7–25)
CALCIUM SERPL-MCNC: 10.7 MG/DL (ref 8.6–10.5)
CHLORIDE SERPL-SCNC: 102 MMOL/L (ref 98–107)
CO2 SERPL-SCNC: 31 MMOL/L (ref 22–29)
CREAT SERPL-MCNC: 1.08 MG/DL (ref 0.57–1)
GLOBULIN SER CALC-MCNC: 3.5 GM/DL
GLUCOSE SERPL-MCNC: 108 MG/DL (ref 65–99)
POTASSIUM SERPL-SCNC: 4.2 MMOL/L (ref 3.5–5.2)
PROT SERPL-MCNC: 7.7 G/DL (ref 6–8.5)
SODIUM SERPL-SCNC: 141 MMOL/L (ref 136–145)

## 2020-06-22 NOTE — PROGRESS NOTES
Lab work overall okay.  Potassium level back to normal.  The calcium level just borderline high.  We will continue to monitor.

## 2020-07-01 LAB — INR PPP: 2.99

## 2020-07-02 ENCOUNTER — ANTICOAGULATION VISIT (OUTPATIENT)
Dept: PHARMACY | Facility: HOSPITAL | Age: 85
End: 2020-07-02

## 2020-07-02 DIAGNOSIS — I48.19 ATRIAL FIBRILLATION, PERSISTENT (HCC): ICD-10-CM

## 2020-07-02 NOTE — PROGRESS NOTES
Anticoagulation Clinic Progress Note    Anticoagulation Summary  As of 2020    INR goal:   2.0-3.0   TTR:   78.3 % (1.7 y)   INR used for dosin.99 (2020)   Warfarin maintenance plan:   2.5 mg every Sat; 5 mg all other days   Weekly warfarin total:   32.5 mg   No change documented:   Beatrice Rice RPH   Plan last modified:   Rusty Interiano RP (2019)   Next INR check:   2020   Priority:   Maintenance   Target end date:   Indefinite    Indications    Atrial fibrillation  persistent (CMS/HCC) [I48.19]             Anticoagulation Episode Summary     INR check location:       Preferred lab:       Send INR reminders to:    [facility]    Comments:         Anticoagulation Care Providers     Provider Role Specialty Phone number    Mary Grace Culp MD Referring Cardiology [phone]          Clinic Interview:  Patient Findings     Negatives:   Signs/symptoms of thrombosis, Signs/symptoms of bleeding,   Laboratory test error suspected, Change in health, Change in alcohol use,   Change in activity, Upcoming invasive procedure, Emergency department   visit, Upcoming dental procedure, Missed doses, Extra doses, Change in   medications, Change in diet/appetite, Hospital admission, Bruising, Other   complaints      Clinical Outcomes     Negatives:   Major bleeding event, Thromboembolic event,   Anticoagulation-related hospital admission, Anticoagulation-related ED   visit, Anticoagulation-related fatality        INR History:  Anticoagulation Monitoring 2020   INR 2.57 2.68 2.99   INR Date 2020 6/3/2020 2020   INR Goal 2.0-3.0 2.0-3.0 2.0-3.0   Trend Same Same Same   Last Week Total 32.5 mg 32.5 mg 32.5 mg   Next Week Total 32.5 mg 32.5 mg 32.5 mg   Sun 5 mg 5 mg 5 mg   Mon 5 mg 5 mg 5 mg   Tue 5 mg 5 mg 5 mg   Wed 5 mg 5 mg 5 mg   Thu 5 mg 5 mg 5 mg   Fri 5 mg 5 mg 5 mg   Sat 2.5 mg 2.5 mg 2.5 mg   Visit Report - - -   Some recent data might be hidden        Plan:  1. INR is Therapeutic today- see above in Anticoagulation Summary.   Will instruct Brittany Villeda to Continue their warfarin regimen- see above in Anticoagulation Summary.  2. Follow up in 4 weeks  3. They have been instructed to call if any changes in medications, doses, concerns, etc. Patient expresses understanding and has no further questions at this time.    Beatrice Rice, [facility]

## 2020-07-15 ENCOUNTER — LAB (OUTPATIENT)
Dept: LAB | Facility: HOSPITAL | Age: 85
End: 2020-07-15

## 2020-07-15 DIAGNOSIS — D70.8 OTHER NEUTROPENIA (HCC): ICD-10-CM

## 2020-07-15 DIAGNOSIS — D47.2 MGUS (MONOCLONAL GAMMOPATHY OF UNKNOWN SIGNIFICANCE): ICD-10-CM

## 2020-07-15 LAB
ALBUMIN SERPL-MCNC: 4 G/DL (ref 3.5–5.2)
ALBUMIN/GLOB SERPL: 1.1 G/DL (ref 1.1–2.4)
ALP SERPL-CCNC: 62 U/L (ref 38–116)
ALT SERPL W P-5'-P-CCNC: 6 U/L (ref 0–33)
ANION GAP SERPL CALCULATED.3IONS-SCNC: 10 MMOL/L (ref 5–15)
AST SERPL-CCNC: 16 U/L (ref 0–32)
BASOPHILS # BLD AUTO: 0.03 10*3/MM3 (ref 0–0.2)
BASOPHILS NFR BLD AUTO: 0.9 % (ref 0–1.5)
BILIRUB SERPL-MCNC: 1.8 MG/DL (ref 0.2–1.2)
BUN SERPL-MCNC: 19 MG/DL (ref 6–20)
BUN/CREAT SERPL: 17.3 (ref 7.3–30)
CALCIUM SPEC-SCNC: 10.6 MG/DL (ref 8.5–10.2)
CHLORIDE SERPL-SCNC: 103 MMOL/L (ref 98–107)
CO2 SERPL-SCNC: 29 MMOL/L (ref 22–29)
CREAT SERPL-MCNC: 1.1 MG/DL (ref 0.6–1.1)
DEPRECATED RDW RBC AUTO: 52.1 FL (ref 37–54)
EOSINOPHIL # BLD AUTO: 0.01 10*3/MM3 (ref 0–0.4)
EOSINOPHIL NFR BLD AUTO: 0.3 % (ref 0.3–6.2)
ERYTHROCYTE [DISTWIDTH] IN BLOOD BY AUTOMATED COUNT: 14 % (ref 12.3–15.4)
GFR SERPL CREATININE-BSD FRML MDRD: 57 ML/MIN/1.73
GLOBULIN UR ELPH-MCNC: 3.5 GM/DL (ref 1.8–3.5)
GLUCOSE SERPL-MCNC: 116 MG/DL (ref 74–124)
HCT VFR BLD AUTO: 38.2 % (ref 34–46.6)
HGB BLD-MCNC: 12.2 G/DL (ref 12–15.9)
IMM GRANULOCYTES # BLD AUTO: 0.01 10*3/MM3 (ref 0–0.05)
IMM GRANULOCYTES NFR BLD AUTO: 0.3 % (ref 0–0.5)
LYMPHOCYTES # BLD AUTO: 1.61 10*3/MM3 (ref 0.7–3.1)
LYMPHOCYTES NFR BLD AUTO: 48.1 % (ref 19.6–45.3)
MCH RBC QN AUTO: 32 PG (ref 26.6–33)
MCHC RBC AUTO-ENTMCNC: 31.9 G/DL (ref 31.5–35.7)
MCV RBC AUTO: 100.3 FL (ref 79–97)
MONOCYTES # BLD AUTO: 0.34 10*3/MM3 (ref 0.1–0.9)
MONOCYTES NFR BLD AUTO: 10.1 % (ref 5–12)
NEUTROPHILS NFR BLD AUTO: 1.35 10*3/MM3 (ref 1.7–7)
NEUTROPHILS NFR BLD AUTO: 40.3 % (ref 42.7–76)
NRBC BLD AUTO-RTO: 0 /100 WBC (ref 0–0.2)
PLATELET # BLD AUTO: 155 10*3/MM3 (ref 140–450)
PMV BLD AUTO: 8.7 FL (ref 6–12)
POTASSIUM SERPL-SCNC: 4.3 MMOL/L (ref 3.5–4.7)
PROT SERPL-MCNC: 7.5 G/DL (ref 6.3–8)
RBC # BLD AUTO: 3.81 10*6/MM3 (ref 3.77–5.28)
SODIUM SERPL-SCNC: 142 MMOL/L (ref 134–145)
WBC # BLD AUTO: 3.35 10*3/MM3 (ref 3.4–10.8)

## 2020-07-15 PROCEDURE — 80053 COMPREHEN METABOLIC PANEL: CPT

## 2020-07-15 PROCEDURE — 36415 COLL VENOUS BLD VENIPUNCTURE: CPT

## 2020-07-15 PROCEDURE — 85025 COMPLETE CBC W/AUTO DIFF WBC: CPT

## 2020-07-16 LAB
ALBUMIN SERPL-MCNC: 3.3 G/DL (ref 2.9–4.4)
ALBUMIN/GLOB SERPL: 0.9 {RATIO} (ref 0.7–1.7)
ALPHA1 GLOB FLD ELPH-MCNC: 0.3 G/DL (ref 0–0.4)
ALPHA2 GLOB SERPL ELPH-MCNC: 0.6 G/DL (ref 0.4–1)
B-GLOBULIN SERPL ELPH-MCNC: 1.5 G/DL (ref 0.7–1.3)
GAMMA GLOB SERPL ELPH-MCNC: 1.4 G/DL (ref 0.4–1.8)
GLOBULIN SER CALC-MCNC: 3.8 G/DL (ref 2.2–3.9)
IGA SERPL-MCNC: 731 MG/DL (ref 64–422)
IGG SERPL-MCNC: 1373 MG/DL (ref 586–1602)
IGM SERPL-MCNC: 54 MG/DL (ref 26–217)
INTERPRETATION SERPL IEP-IMP: ABNORMAL
KAPPA LC SERPL-MCNC: 51.4 MG/L (ref 3.3–19.4)
KAPPA LC/LAMBDA SER: 1.06 {RATIO} (ref 0.26–1.65)
LAMBDA LC FREE SERPL-MCNC: 48.5 MG/L (ref 5.7–26.3)
Lab: ABNORMAL
M-SPIKE: ABNORMAL G/DL
PROT SERPL-MCNC: 7.1 G/DL (ref 6–8.5)

## 2020-07-22 ENCOUNTER — OFFICE VISIT (OUTPATIENT)
Dept: ONCOLOGY | Facility: CLINIC | Age: 85
End: 2020-07-22

## 2020-07-22 ENCOUNTER — TELEPHONE (OUTPATIENT)
Dept: FAMILY MEDICINE CLINIC | Facility: CLINIC | Age: 85
End: 2020-07-22

## 2020-07-22 ENCOUNTER — APPOINTMENT (OUTPATIENT)
Dept: LAB | Facility: HOSPITAL | Age: 85
End: 2020-07-22

## 2020-07-22 VITALS
RESPIRATION RATE: 18 BRPM | DIASTOLIC BLOOD PRESSURE: 60 MMHG | WEIGHT: 222.6 LBS | HEART RATE: 74 BPM | SYSTOLIC BLOOD PRESSURE: 121 MMHG | OXYGEN SATURATION: 95 % | HEIGHT: 64 IN | TEMPERATURE: 97.5 F | BODY MASS INDEX: 38 KG/M2

## 2020-07-22 DIAGNOSIS — D70.8 OTHER NEUTROPENIA (HCC): ICD-10-CM

## 2020-07-22 DIAGNOSIS — K51.30 ULCERATIVE RECTOSIGMOIDITIS WITHOUT COMPLICATION (HCC): ICD-10-CM

## 2020-07-22 DIAGNOSIS — D47.2 MGUS (MONOCLONAL GAMMOPATHY OF UNKNOWN SIGNIFICANCE): Primary | ICD-10-CM

## 2020-07-22 PROCEDURE — 99214 OFFICE O/P EST MOD 30 MIN: CPT | Performed by: INTERNAL MEDICINE

## 2020-07-22 NOTE — PROGRESS NOTES
Subjective     REASON FOR FOLLOW UP:   1. CHRONIC LEUKOPENIA/ Neutropenia  2. IgA Lambda MGUS  3. Ulcerative Colitis  4. Positive ALEC    HISTORY OF PRESENT ILLNESS:  The patient is a 85 y.o. year old female who is here for follow up of the above noted  issuse.  She had actually been evaluated by Dr. Choi in our practice in 2014 for leukopenia.  At that time Dr. Choi had sent a peripheral blood flow cytometry which did not show any abnormal populations of white cells.      She is here today for routine six-month follow-up and monitoring of her blood counts and monoclonal gammopathy..    She had a repeat serum protein electrophoresis performed 7/15/2020 that showed stable IgA monoclonal spike.  Her free serum light chains were slightly higher but her kappa lambda ratio remains normal.  Her white count remains low but stable and overall she appears to be doing very well.      History of Present Illness     ALLERGIES:    Allergies   Allergen Reactions   • Codeine Hallucinations   • Amitriptyline Rash   • Amoxicillin-Pot Clavulanate Rash   • Aspirin Unknown - Low Severity     Patient doesn't know why   • Bactrim [Sulfamethoxazole-Trimethoprim] Rash   • Carisoprodol-Aspirin-Codeine Palpitations   • Iodinated Diagnostic Agents Rash   • Latex Rash   • Naproxen Rash   • Nsaids Unknown - Low Severity     unkknown   • Soma Compound With Codeine [Carisoprodol-Aspirin-Codeine] Rash   • Sulfa Antibiotics Rash   • Tramadol Palpitations     heart races           Review of Systems   Constitutional: Negative for activity change, appetite change, fatigue, fever and unexpected weight change.   HENT: Negative for hearing loss, nosebleeds, trouble swallowing and voice change.    Eyes: Negative for visual disturbance.   Respiratory: Negative for cough, shortness of breath and wheezing.    Cardiovascular: Negative for chest pain and palpitations.   Gastrointestinal: Negative for abdominal pain, nausea and vomiting.   Genitourinary:  "Negative for difficulty urinating, frequency, hematuria and urgency.   Musculoskeletal: Positive for arthralgias and gait problem. Negative for back pain and neck pain.   Skin: Negative for rash.   Neurological: Negative for dizziness, seizures, syncope and headaches.        Balance issues.   Hematological: Negative for adenopathy. Does not bruise/bleed easily.   Psychiatric/Behavioral: Negative for behavioral problems. The patient is not nervous/anxious.    Reviewed and unchanged on the visit of 7/22/2020     Objective     Vitals:    07/22/20 0956   BP: 121/60   Pulse: 74   Resp: 18   Temp: 97.5 °F (36.4 °C)   TempSrc: Skin   SpO2: 95%   Weight: 101 kg (222 lb 9.6 oz)   Height: 162.6 cm (64.02\")   PainSc: 0-No pain     Current Status 7/22/2020   ECOG score 1       Physical Exam   Constitutional: She is oriented to person, place, and time. She appears well-developed and well-nourished. No distress.   HENT:   Head: Normocephalic.   Eyes: Pupils are equal, round, and reactive to light. Conjunctivae and EOM are normal. No scleral icterus.   Neck: Normal range of motion. Neck supple. No JVD present. No thyromegaly present.   Cardiovascular: Normal rate and regular rhythm. Exam reveals no gallop and no friction rub.   Murmur heard.  Pulmonary/Chest: Effort normal and breath sounds normal. She has no wheezes. She has no rales.   Abdominal: Soft. She exhibits no distension and no mass. There is no tenderness.   Musculoskeletal: Normal range of motion. She exhibits edema. She exhibits no deformity.   Lymphadenopathy:     She has no cervical adenopathy.   Neurological: She is alert and oriented to person, place, and time. She has normal reflexes. No cranial nerve deficit.   Ambulates with a cane.   Skin: Skin is warm and dry. No rash noted. No erythema.   Psychiatric: She has a normal mood and affect. Her behavior is normal. Judgment normal.   Unchanged on the visit of 7/22/2020    RECENT LABS:  Hematology WBC   Date Value " Ref Range Status   07/15/2020 3.35 (L) 3.40 - 10.80 10*3/mm3 Final   09/27/2019 2.97 (L) 3.40 - 10.80 10*3/mm3 Final     RBC   Date Value Ref Range Status   07/15/2020 3.81 3.77 - 5.28 10*6/mm3 Final   09/27/2019 3.95 3.77 - 5.28 10*6/mm3 Final     Hemoglobin   Date Value Ref Range Status   07/15/2020 12.2 12.0 - 15.9 g/dL Final     Hematocrit   Date Value Ref Range Status   07/15/2020 38.2 34.0 - 46.6 % Final     Platelets   Date Value Ref Range Status   07/15/2020 155 140 - 450 10*3/mm3 Final        Lab Results   Component Value Date    GLUCOSE 116 07/15/2020    BUN 19 07/15/2020    CREATININE 1.10 07/15/2020    EGFRIFNONA 48 (L) 06/19/2020    EGFRIFAFRI 57 (L) 07/15/2020    BCR 17.3 07/15/2020    K 4.3 07/15/2020    CO2 29.0 07/15/2020    CALCIUM 10.6 (C) 07/15/2020    PROTENTOTREF 7.1 07/15/2020    ALBUMIN 4.00 07/15/2020    ALBUMIN 3.3 07/15/2020    LABIL2 0.9 07/15/2020    AST 16 07/15/2020    ALT 6 07/15/2020       Lab Results   Component Value Date    IRON 71 01/03/2020    TIBC 372 01/03/2020    FERRITIN 80.00 01/03/2020       SPEP/DMITRIY 7/15/2020  IgG  586 - 1602 mg/dL 1373    IgA  64 - 422 mg/dL 731High     IgM  26 - 217 mg/dL 54    Total Protein  6.0 - 8.5 g/dL 7.1    Albumin  2.9 - 4.4 g/dL 3.3    Alpha-1-Globulin  0.0 - 0.4 g/dL 0.3    Alpha-2-Globulin  0.4 - 1.0 g/dL 0.6    Beta Globulin  0.7 - 1.3 g/dL 1.5High     Gamma Globulin  0.4 - 1.8 g/dL 1.4    M-Kip  Not Observed g/dL Comment:    Comment: Due to the small quantity of monoclonal protein, unable to   quantitate the M-spike.   Globulin  2.2 - 3.9 g/dL 3.8    A/G Ratio  0.7 - 1.7 0.9    Immunofixation Reflex, Serum Comment    Comment: Immunofixation shows IgA monoclonal protein with lambda light chain   specificity.     Free Light Chain, Kappa  3.3 - 19.4 mg/L 51.4High     Free Lambda Light Chains  5.7 - 26.3 mg/L 48.5High     Kappa/Lambda Ratio  0.26 - 1.65 1.06        BONE MARROW BIOPSY 9/13/2017  No evidence malignancy or plasma cell excess.  Absent stainable iron.    Assessment/Plan   1.  Chronic leukopenia/neutropenia without any recurring infections.  As noted above she been evaluated in our office in 2014 and at that time peripheral blood flow cytometry was normal.  I suspect this may be a combination of benign constitutional leukopenia, which is common among the  population with some contribution or worsening of leukopenia due to her medications for ulcerative colitis or possible autoimmune leukopenia.  Her counts remain low but stable. Bone marrow exam on 9/13/2017 did not show any evidence of marrow pathology other than absent stainable iron.  2.  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  Her IgA level and lambda light chains have been stable over the past 6 months.  With her bone marrow showing no excess of plasma cells we will just plan on observing this for now with repeat serum protein studies every 6 months.   3.  Ulcerative colitis.  She continues to follow-up with her gastroenterologist Dr. English  4.  Positive ALEC on previous lab work.  It is possible she could also have some degree of autoimmune neutropenia.      Plan    1.  We discussed the results of the lab studies with the patient and  reassured her that monoclonal gammopathy and leukopenia are stable.  2.  She'll be scheduled for return visit in 6 months and will undergo repeat labs one week prior to that visit including CBC, serum chemistries, iron studies, and serum protein electrophoresis with immunofixation.

## 2020-07-22 NOTE — TELEPHONE ENCOUNTER
Pt is taking miralax for constipation.    Pt is asking if the miralax would have any affects to her coumadin level?    Pt can be reached #[phone].

## 2020-07-24 RX ORDER — OMEPRAZOLE 20 MG/1
CAPSULE, DELAYED RELEASE ORAL
Qty: 90 CAPSULE | Refills: 0 | Status: SHIPPED | OUTPATIENT
Start: 2020-07-24 | End: 2020-10-13

## 2020-07-27 RX ORDER — FUROSEMIDE 20 MG/1
TABLET ORAL
Qty: 270 TABLET | Refills: 1 | Status: SHIPPED | OUTPATIENT
Start: 2020-07-27 | End: 2020-09-03

## 2020-07-29 LAB — INR PPP: 2.54

## 2020-07-30 ENCOUNTER — ANTICOAGULATION VISIT (OUTPATIENT)
Dept: PHARMACY | Facility: HOSPITAL | Age: 85
End: 2020-07-30

## 2020-07-30 DIAGNOSIS — I48.19 ATRIAL FIBRILLATION, PERSISTENT (HCC): ICD-10-CM

## 2020-07-30 RX ORDER — MESALAMINE 0.38 G/1
CAPSULE, EXTENDED RELEASE ORAL
Qty: 360 CAPSULE | Refills: 1 | Status: SHIPPED | OUTPATIENT
Start: 2020-07-30 | End: 2021-02-26

## 2020-07-30 NOTE — PROGRESS NOTES
Anticoagulation Clinic Progress Note    Anticoagulation Summary  As of 2020    INR goal:   2.0-3.0   TTR:   79.2 % (1.8 y)   INR used for dosin.54 (2020)   Warfarin maintenance plan:   2.5 mg every Sat; 5 mg all other days   Weekly warfarin total:   32.5 mg   Plan last modified:   Rusty Interiano [facility] (2019)   Next INR check:   2020   Priority:   Maintenance   Target end date:   Indefinite    Indications    Atrial fibrillation  persistent (CMS/HCC) [I48.19]             Anticoagulation Episode Summary     INR check location:       Preferred lab:       Send INR reminders to:    [facility]    Comments:         Anticoagulation Care Providers     Provider Role Specialty Phone number    Mary Grace Culp MD Referring Cardiology [phone]          Clinic Interview:      INR History:  Anticoagulation Monitoring 2020   INR 2.68 2.99 2.54   INR Date 6/3/2020 2020 2020   INR Goal 2.0-3.0 2.0-3.0 2.0-3.0   Trend Same Same Same   Last Week Total 32.5 mg 32.5 mg 32.5 mg   Next Week Total 32.5 mg 32.5 mg 32.5 mg   Sun 5 mg 5 mg 5 mg   Mon 5 mg 5 mg 5 mg   Tue 5 mg 5 mg 5 mg   Wed 5 mg 5 mg 5 mg   Thu 5 mg 5 mg 5 mg   Fri 5 mg 5 mg 5 mg   Sat 2.5 mg 2.5 mg 2.5 mg   Visit Report - - -   Some recent data might be hidden       Plan:  1. INR is Therapeutic today- see above in Anticoagulation Summary.   Will instruct Brittany Villeda to Continue their warfarin regimen- see above in Anticoagulation Summary.  2. Follow up in 4 weeks  3. Spoke with pt today. They have been instructed to call if any changes in medications, doses, concerns, etc. Patient expresses understanding and has no further questions at this time.    Yasmeen Pichardo [facility]

## 2020-08-13 ENCOUNTER — TELEPHONE (OUTPATIENT)
Dept: GASTROENTEROLOGY | Facility: CLINIC | Age: 85
End: 2020-08-13

## 2020-08-13 NOTE — TELEPHONE ENCOUNTER
"Call topt.  States for past wk, does not adequately empty with AM BM's.  Usually has 1 bm daily.  Now having about 3/day - \"going in little spurts\".  Denies blood, fever.  Stool is soft.      Taking apriso as ordered.  Has never been on fiber.  States does not want anything would have to take daily - just something she can use if needed.     Update/request to DR English   "

## 2020-08-13 NOTE — TELEPHONE ENCOUNTER
Recommend benefiber daily - use for 2 weeks to hopefully get her back on track - can continue thereafter if needed

## 2020-08-13 NOTE — TELEPHONE ENCOUNTER
----- Message from Matty Santana sent at 8/13/2020 10:15 AM EDT -----  Regarding: med suggestion advice  Contact: [phone]  Patient is having a hard time having a complete bowel movement she would like to know if there is something you can suggest she take that will not affect her warfarin.  Please advise [phone]

## 2020-08-20 ENCOUNTER — TELEPHONE (OUTPATIENT)
Dept: CARDIOLOGY | Facility: CLINIC | Age: 85
End: 2020-08-20

## 2020-08-20 NOTE — TELEPHONE ENCOUNTER
If the pain was positional then can keep routine appointment.  Otherwise please see if she can come in and see Corrine

## 2020-08-20 NOTE — TELEPHONE ENCOUNTER
Spoke with pt.  It was not positional.  She will await scheduling to call to get her with an APRN.

## 2020-08-20 NOTE — TELEPHONE ENCOUNTER
Pt LMM re: CP since Sunday.    Called pt back.  She stated everything is gone.      Sunday the pain started and peaked yesterday.  She woke this AM and pain is gone and she has no sx.      During the episode she had CP not exertional, no SOA, no fatigue, no dizziness.  Just pain below her left breast that felt like a pinch.  She said it peaked yesterday and was worse with laying down and after a cough.      BP today was 120/62 with 72.  Advised her if this comes back then go to the ER or call 911.  Please advise.

## 2020-08-24 ENCOUNTER — ANTICOAGULATION VISIT (OUTPATIENT)
Dept: PHARMACY | Facility: HOSPITAL | Age: 85
End: 2020-08-24

## 2020-08-24 ENCOUNTER — OFFICE VISIT (OUTPATIENT)
Dept: CARDIOLOGY | Facility: CLINIC | Age: 85
End: 2020-08-24

## 2020-08-24 ENCOUNTER — TELEPHONE (OUTPATIENT)
Dept: CARDIOLOGY | Facility: CLINIC | Age: 85
End: 2020-08-24

## 2020-08-24 VITALS
DIASTOLIC BLOOD PRESSURE: 64 MMHG | WEIGHT: 224.2 LBS | SYSTOLIC BLOOD PRESSURE: 110 MMHG | HEIGHT: 64 IN | HEART RATE: 70 BPM | BODY MASS INDEX: 38.28 KG/M2

## 2020-08-24 DIAGNOSIS — I71.21 ASCENDING AORTIC ANEURYSM (HCC): ICD-10-CM

## 2020-08-24 DIAGNOSIS — R07.89 OTHER CHEST PAIN: Primary | ICD-10-CM

## 2020-08-24 DIAGNOSIS — I51.89 DIASTOLIC DYSFUNCTION: ICD-10-CM

## 2020-08-24 DIAGNOSIS — I27.20 PULMONARY HYPERTENSION (HCC): ICD-10-CM

## 2020-08-24 DIAGNOSIS — I48.19 ATRIAL FIBRILLATION, PERSISTENT (HCC): ICD-10-CM

## 2020-08-24 DIAGNOSIS — I10 ESSENTIAL HYPERTENSION: ICD-10-CM

## 2020-08-24 DIAGNOSIS — Z79.01 CHRONIC ANTICOAGULATION: ICD-10-CM

## 2020-08-24 DIAGNOSIS — E83.52 HYPERCALCEMIA: ICD-10-CM

## 2020-08-24 LAB
INR PPP: 2.9 (ref 0.91–1.09)
PROTHROMBIN TIME: 34.5 SECONDS (ref 10–13.8)

## 2020-08-24 PROCEDURE — 99214 OFFICE O/P EST MOD 30 MIN: CPT | Performed by: NURSE PRACTITIONER

## 2020-08-24 PROCEDURE — 36416 COLLJ CAPILLARY BLOOD SPEC: CPT

## 2020-08-24 PROCEDURE — 85610 PROTHROMBIN TIME: CPT

## 2020-08-24 PROCEDURE — 93000 ELECTROCARDIOGRAM COMPLETE: CPT | Performed by: NURSE PRACTITIONER

## 2020-08-24 NOTE — PROGRESS NOTES
I have supervised and reviewed the notes, assessments, and/or procedures performed by our Pharmacy Intern. The documented assessment and plan were developed cooperatively. I concur with the documentation of this patient encounter.    Rusty Interiano RP

## 2020-08-24 NOTE — PROGRESS NOTES
Anticoagulation Clinic Progress Note    Anticoagulation Summary  As of 2020    INR goal:   2.0-3.0   TTR:   80.0 % (1.9 y)   INR used for dosin.9 (2020)   Warfarin maintenance plan:   2.5 mg every Sat; 5 mg all other days   Weekly warfarin total:   32.5 mg   No change documented:   Bernie Campbell, Pharmacy Intern   Plan last modified:   Rusty Interiano [facility] (2019)   Next INR check:   2020   Priority:   Maintenance   Target end date:   Indefinite    Indications    Atrial fibrillation  persistent (CMS/HCC) [I48.19]             Anticoagulation Episode Summary     INR check location:       Preferred lab:       Send INR reminders to:    [facility]    Comments:         Anticoagulation Care Providers     Provider Role Specialty Phone number    Mary Grace Culp MD Referring Cardiology [phone]          Clinic Interview:  Patient Findings     Negatives:   Signs/symptoms of thrombosis, Signs/symptoms of bleeding,   Laboratory test error suspected, Change in health, Change in alcohol use,   Change in activity, Upcoming invasive procedure, Emergency department   visit, Upcoming dental procedure, Missed doses, Extra doses, Change in   medications, Change in diet/appetite, Hospital admission, Bruising, Other   complaints      Clinical Outcomes     Negatives:   Major bleeding event, Thromboembolic event,   Anticoagulation-related hospital admission, Anticoagulation-related ED   visit, Anticoagulation-related fatality        INR History:  Anticoagulation Monitoring 2020   INR 2.99 2.54 2.9   INR Date 2020   INR Goal 2.0-3.0 2.0-3.0 2.0-3.0   Trend Same Same Same   Last Week Total 32.5 mg 32.5 mg 32.5 mg   Next Week Total 32.5 mg 32.5 mg 32.5 mg   Sun 5 mg 5 mg 5 mg   Mon 5 mg 5 mg 5 mg   Tue 5 mg 5 mg 5 mg   Wed 5 mg 5 mg 5 mg   Thu 5 mg 5 mg 5 mg   Fri 5 mg 5 mg 5 mg   Sat 2.5 mg 2.5 mg 2.5 mg   Visit Report - - -   Some recent data might be  hidden       Plan:  1. INR is Therapeutic today- see above in Anticoagulation Summary.  Will instruct Brittany Villeda to Continue their warfarin regimen- see above in Anticoagulation Summary.  2. Follow up in 4 weeks  3. Patient declines warfarin refills.  4. Verbal and written information provided. Patient expresses understanding and has no further questions at this time.    Bernie Campbell, Pharmacy Intern

## 2020-08-24 NOTE — TELEPHONE ENCOUNTER
Called to confirm plan with patient as I told her I would call after about her INR results.  We will proceed with CTA tomorrow and needs premedication.  She will call the office if she has not heard anything about this by tomorrow morning.  She will go the ER for any chest discomfort recurrence prior to that time however she reports that she is feeling good at this point.        Geetha Hendricks, or Hamida---   patient needs CTA tomorrow to rule out thoracic aortic dissection.  She needs to be premedicated given that she has a history of contrast dye allergy but has done okay with premedication in the past.  Please let me know if I need to change the CTA order to stat.

## 2020-08-24 NOTE — PROGRESS NOTES
Date of Office Visit: 2020  Encounter Provider: Tereza Jackson, NIKITA, APRN  Place of Service: [facility] CARDIOLOGY  Patient Name: Brittany Villeda  :1935        Subjective:     Chief Complaint:  Follow-up, paroxysmal atrial fibrillation, hypertension.      History of Present Illness:  Brittany Villeda is a 85 y.o. female patient of Dr. Culp.  I am seeing this patient in the office today and I have reviewed her records.    Patient has a history of  paroxysmal atrial fibrillation, hypertension, obstructive sleep apnea, obesity, imobility, pulmonary hypertension, nonsustained ventricular tachycardia, diastolic dysfunction.     In  patient had nonsustained ventricular tachycardia.  Stress perfusion study was negative for ischemia.  Echocardiogram showed normal left ventricular size and function with moderate LVH.  2012 she had chronic obstructive pulmonary disease exacerbation as well as chest pain that resolved with treatment of her COPD.  2013 she had persistent dizziness in the setting of new onset paroxysmal atrial fibrillation.  She was admitted to the hospital and not felt to have had a stroke.  Symptoms actually resolved with ear manipulation and were felt to be more vestibular in nature.  She also developed paroxysmal atrial fibrillation with early bradycardia which resolved with treatment of sleep apnea and AV flaco blocker therapy.  She was placed on warfarin.  She also had a heme positive stool with anemia and was seen by gastroenterology and EGD and colonoscopy were done.  EGD showed mild erythema but otherwise stable.  She initiated therapy of BiPAP.  2017 she presented to the ER with chest pain.  Ruled out for MI.  Stress perfusion study was negative for ischemia with normal systolic function.  Zio patch shows sinus rhythm with episodes of SVT that was symptomatic.  2018 EKG showed pauses in the setting of bifascicular block.   24-hour Holter showed 65 pauses 3 seconds long in duration.  Sinus rhythm was present throughout most of the study.  With complaints of palpitations PACs were noted.  There were also 14 episodes of atrial tachycardia lasting 4 beats.  There is also episode of 2-1 AV block and questionable third-degree block.  Echocardiogram showed normal systolic function, grade 1 diastolic dysfunction, borderline right ventricular enlargement, mild to moderate tricuspid valve regurgitation, and RVSP of 48 mmHg.  A sending aorta was mildly dilated at 3.8 cm.  Pacemaker placement was recommended the patient deferred.  By September 2018 patient was noted to have more symptomatic bradycardia and after much discussion underwent pacemaker placement November 2018.  Patient had a telehealth visit 4/2020 due to coronavirus epidemic.  At this point she was feeling well without cardiac symptoms or concerns.  She called the office 8/2020 reporting that she had some chest discomfort that occurred Sunday and Monday at rest under left breast feeling like a pinch.  Blood pressure was 120/62 and heart rate 72.  He was instructed to schedule follow-up with APRN.      Patient presents to office today for follow-up appointment.  Patient's daughter is with her in the office today, per patient preference.  Patient is not the best historian.  She reports that a week ago Sunday she was having an intermittent pinching sensation to her left chest.  Last Wednesday she developed some lower sternal chest discomfort and then discomfort and tightening to her left chest which was quite significant and worse with a deep breath and worse when she tried to get up off of her potty chair though reports that this is not necessarily positional.  She was having quite a bit of shortness of breath with it as well but did not have any radiation of the discomfort (no radiation to jaw, neck, arm, or back) nor did she have any palpitations, diaphoresis, or other associated  symptoms.  It lasted several hours and she reports that she knew that she should call EMS but she did not and her daughter chastised her for it later.  Thursday morning when she woke up she had some mild chest discomfort though it was much improved.  She reports she has never had an episode of chest discomfort like that before as it was quite significant.  She has not had any recurrence of shortness of breath since the episode on Wednesday.  She has had some intermittent lightheadedness for the last few days but reports that she also has a history of this and a history of vertigo though this has not necessarily felt like her vertigo.  She denies any active chest discomfort or shortness of breath in the office.  Denies any palpitations, racing heartbeat sensation, lower extremity edema, fatigue, fainting, falling, or abnormal bleeding.  She had an INR check about a month ago that was at goal.          Past Medical History:   Diagnosis Date   • Anemia    • Arrhythmia    • Arthritis    • Asthma    • Bradycardia    • Chest pain    • Colitis    • COPD (chronic obstructive pulmonary disease) (CMS/HCC)    • Diastolic dysfunction    • Essential hypertension 5/12/2016   • GERD (gastroesophageal reflux disease)    • Heart block    • Hypertension    • Hypertensive heart disease    • Hyperthyroidism    • Kidney stone    • Leukopenia    • Low back pain    • MGUS (monoclonal gammopathy of unknown significance)    • Nephrolithiasis    • Obesity    • Paroxysmal atrial fibrillation (CMS/HCC)    • Peptic ulceration    • PSVT (paroxysmal supraventricular tachycardia) (CMS/HCC)    • Pulmonary hypertension (CMS/HCC)    • Rectal bleed    • Sleep apnea    • Trifascicular block    • 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)    • Ventricular tachycardia (CMS/HCC)     nonsustained   • Vertigo      Past Surgical History:   Procedure Laterality Date   • BACK SURGERY      lumbar fusion   • CARDIAC ELECTROPHYSIOLOGY PROCEDURE  N/A 11/7/2018    Procedure: Pacemaker DC new   [place];  Surgeon: Jesus Granda MD;  Location:  [facility];  Service: Cardiology   • CHOLECYSTECTOMY     • COLONOSCOPY  06/16/2014    colitis, cryptitis,  tics, NBIH, TA w/low grade dysplasia   • COLONOSCOPY N/A 10/28/2019    Procedure: COLONOSCOPY WITH COLD AND HOT POLYPECTOMIES;  Surgeon: Micaela English MD;  Location: [facility] ENDOSCOPY;  Service: Gastroenterology   • HEMORRHOIDECTOMY     • HYSTERECTOMY     • KNEE ARTHROPLASTY     • MYOMECTOMY     • SHOULDER SURGERY     • SINUS SURGERY     • TOE NAIL AMPUTATION  03/04/2019   • TONSILLECTOMY       Outpatient Medications Prior to Visit   Medication Sig Dispense Refill   • Acetaminophen (PAIN RELIEVER PO) Take 1 tablet by mouth As Needed.     • albuterol sulfate  (90 Base) MCG/ACT inhaler Inhale 2 puffs Every 6 (Six) Hours As Needed for Wheezing. 1 inhaler 0   • calcium carbonate-cholecalciferol 500-400 MG-UNIT tablet tablet Take 1 tablet by mouth 2 (two) times a day.     • furosemide (LASIX) 20 MG tablet TAKE 2 TABLETS IN THE MORNING  AND TAKE 1 TABLET EVERY EVENING 270 tablet 1   • gabapentin (NEURONTIN) 300 MG capsule Take 1 capsule by mouth 2 (two) times a day. 180 capsule 1   • HYDROcodone-acetaminophen (NORCO) 5-325 MG per tablet 1/2 - 1 tab po qd AS NEEDED for severe shoulder arthritis 20 tablet 0   • mesalamine (APRISO) 0.375 g 24 hr capsule TAKE 4 CAPSULES DAILY 360 capsule 1   • omeprazole (priLOSEC) 20 MG capsule TAKE 1 CAPSULE EVERY DAY 90 capsule 0   • vitamin B-12 (CYANOCOBALAMIN) 1000 MCG tablet Take 1,000 mcg by mouth Daily.     • warfarin (COUMADIN) 5 MG tablet Take one-half tablet (2.5 mg) by mouth on Sat, and take one tablet (5 mg) by mouth all other days or as directed. 85 tablet 1   • Wheat Dextrin (BENEFIBER DRINK MIX PO) Take  by mouth 2 (two) times a day.       No facility-administered medications prior to visit.        Allergies as of 08/24/2020 - Reviewed  2020   Allergen Reaction Noted   • Codeine Hallucinations 2017   • Amitriptyline Rash 2016   • Amoxicillin-pot clavulanate Rash 2016   • Aspirin Unknown - Low Severity 2016   • Bactrim [sulfamethoxazole-trimethoprim] Rash 2016   • Carisoprodol-aspirin-codeine Palpitations 2016   • Iodinated diagnostic agents Rash 2016   • Latex Rash 2016   • Naproxen Rash 2016   • Nsaids Unknown - Low Severity 2016   • Soma compound with codeine [carisoprodol-aspirin-codeine] Rash 2016   • Sulfa antibiotics Rash 2016   • Tramadol Palpitations 2019     Social History     Socioeconomic History   • Marital status:      Spouse name: Not on file   • Number of children: 10   • Years of education: High School   • Highest education level: Not on file   Occupational History   • Occupation: Caregiver     Employer: RETIRED     Comment: worked for [employer]   Tobacco Use   • Smoking status: Former Smoker     Packs/day: 1.50     Years: 10.00     Pack years: 15.00     Start date:      Last attempt to quit: 1965     Years since quittin.6   • Smokeless tobacco: Never Used   • Tobacco comment: QUIT SMOKING    Substance and Sexual Activity   • Alcohol use: No     Comment: Daily caffeine use - one cup of coffee   • Drug use: Defer   • Sexual activity: Defer     Family History   Problem Relation Age of Onset   • Diabetes Mother    • Breast cancer Sister    • Kidney cancer Sister    • Heart disease Sister    • Prostate cancer Brother    • Prostate cancer Brother    • Prostate cancer Brother    • Malig Hyperthermia Neg Hx        Review of Systems   Constitution: Negative for malaise/fatigue.   Cardiovascular: Positive for chest pain (No currently). Negative for dyspnea on exertion, leg swelling, near-syncope, palpitations and syncope.   Respiratory: Positive for shortness of breath (Only with episode of chest discomfort last Wednesday  ").    Hematologic/Lymphatic: Negative for bleeding problem.   Musculoskeletal: Negative for falls.   Gastrointestinal: Negative for melena.        Denies rectal bleeding or other abnormal bleeding   Genitourinary: Negative for hematuria.   Neurological: Positive for light-headedness.   Psychiatric/Behavioral: Negative for altered mental status.          Objective:     Vitals:    08/24/20 1358   BP: 110/64   BP Location: Right arm   Cuff Size: Large Adult   Pulse: 70   Weight: 102 kg (224 lb 3.2 oz)   Height: 162.6 cm (64\")     Body mass index is 38.48 kg/m².      PHYSICAL EXAM:  Physical Exam   Constitutional: She is oriented to person, place, and time. She appears well-developed and well-nourished. No distress.   HENT:   Head: Normocephalic and atraumatic.   Eyes: Pupils are equal, round, and reactive to light.   Neck: Neck supple. No JVD present. Carotid bruit is not present. No tracheal deviation present. No thyromegaly present.   Cardiovascular: Normal rate, regular rhythm, normal heart sounds and intact distal pulses. Exam reveals no gallop and no friction rub.   No murmur heard.  Pulses:       Radial pulses are 2+ on the right side, and 2+ on the left side.        Posterior tibial pulses are 2+ on the right side, and 2+ on the left side.   Pulmonary/Chest: Effort normal and breath sounds normal. No respiratory distress. She has no wheezes. She has no rales.   Abdominal: Soft. Bowel sounds are normal. She exhibits no distension.   Musculoskeletal: She exhibits no edema, tenderness or deformity.   Neurological: She is alert and oriented to person, place, and time.   Skin: Skin is warm and dry. No rash noted. She is not diaphoretic. No erythema.   Psychiatric: She has a normal mood and affect. Her behavior is normal. Judgment normal.           ECG 12 Lead  Date/Time: 8/24/2020 2:20 PM  Performed by: Tereza Jackson DNP, APRN  Authorized by: Tereza Jackson DNP, APRN   Comparison: compared with previous ECG " from 10/4/2019  Rhythm comments: Atrial fibrillation and ventricular paced complexes    Clinical impression: abnormal EKG  Comments: No significant changes from previous EKG              Assessment:       Diagnosis Plan   1. Other chest pain     2. Ascending aortic aneurysm (CMS/HCC)     3. Atrial fibrillation, persistent (CMS/HCC)     4. Chronic anticoagulation  CBC & Differential   5. Essential hypertension     6. Pulmonary hypertension (CMS/HCC)     7. Diastolic dysfunction     8. Hypercalcemia  Basic Metabolic Panel         Plan:     1. Chest pain: Asymptomatic in the office today.  We are going to check an INR today.  If INR at goal then we will do a CTA to rule out dissection of thoracic aortic dilation.  If INR not at goal then we will do a CTA PE protocol and then also see if they are able to comment on the thoracic aorta.  She will need premedication and we will get this done first thing tomorrow.  If CTA is normal then we will proceed with repeat PET stress test.  ER if she develops any recurrent chest discomfort symptoms prior to that time.  If cardiac testing comes back normal then would recommend following up with GI and PCP for possible noncardiac causes of symptoms.  2. Persistent atrial fibrillation with history of paroxysmal supraventricular tachycardia: Anticoagulated with warfarin.  Denies falls or abnormal bleeding.  Remains in persistent atrial fibrillation on pacemaker check.  3. Chronic anticoagulation therapy: Denies falls or abnormal bleeding.  4. Status post permanent pacemaker placement: Pacemaker check normal in the office today.  Remains in persistent atrial fibrillation but no other events reported and pacemaker functioning normally.  5. Hypertension: Blood pressure well controlled in the office today and was also well controlled when she called in the other day reporting her symptoms.  6. History of diastolic dysfunction: She appears euvolemic on exam.  7. Dilated ascending aorta:  Measuring 4.0 cm.  Not read by radiology as aneurysm.  Plan as above.  8. Pulmonary hypertension: RV pressure elevated 7/20 19 to 53 mmHg.  [place] to be from untreated sleep apnea.  9. History of chest discomfort: Negative PET stress test 12/2017.  She believes that the chest discomfort she had more recently is different from what she had in the past though not strictly typical in nature.  10. Untreated sleep apnea: Intolerant of BiPAP machine.  11. History of rectal bleeding and hemorrhoids: Followed by Dr. Encinas.  Denies any recent rectal bleeding or blood in urine or other bleeding.  12. Hypercalcemia: She is not sure who instructed her to take her current calcium medication dose but she is going to call Dr. Esparza as soon as possible to find out if she needs to adjust her home dose of calcium.    Patient to follow-up with Dr. Culp in 6 months or sooner if needed for any new, recurrent, or worsening symptoms or other issues/concerns.  She will go to the ER for any recurrent chest discomfort prior to testing or prior to follow-up.    Plan of care reviewed with Dr. Robbi MD.        ADDENDUM:   INR at goal.  We will proceed with CTA chest to rule out thoracic aortic dissection.             Your medication list           Accurate as of August 24, 2020  3:33 PM. If you have any questions, ask your nurse or doctor.               CONTINUE taking these medications      Instructions Last Dose Given Next Dose Due   albuterol sulfate  (90 Base) MCG/ACT inhaler  Commonly known as:  PROVENTIL HFA;VENTOLIN HFA;PROAIR HFA      Inhale 2 puffs Every 6 (Six) Hours As Needed for Wheezing.       BENEFIBER DRINK MIX PO      Take  by mouth 2 (two) times a day.       calcium carbonate-cholecalciferol 500-400 MG-UNIT tablet tablet      Take 1 tablet by mouth 2 (two) times a day.       furosemide 20 MG tablet  Commonly known as:  LASIX      TAKE 2 TABLETS IN THE MORNING  AND TAKE 1 TABLET EVERY EVENING       gabapentin 300 MG  capsule  Commonly known as:  NEURONTIN      Take 1 capsule by mouth 2 (two) times a day.       HYDROcodone-acetaminophen 5-325 MG per tablet  Commonly known as:  NORCO      1/2 - 1 tab po qd AS NEEDED for severe shoulder arthritis       mesalamine 0.375 g 24 hr capsule  Commonly known as:  APRISO      TAKE 4 CAPSULES DAILY       omeprazole 20 MG capsule  Commonly known as:  priLOSEC      TAKE 1 CAPSULE EVERY DAY       PAIN RELIEVER PO      Take 1 tablet by mouth As Needed.       vitamin B-12 1000 MCG tablet  Commonly known as:  CYANOCOBALAMIN      Take 1,000 mcg by mouth Daily.       warfarin 5 MG tablet  Commonly known as:  COUMADIN      Take one-half tablet (2.5 mg) by mouth on Sat, and take one tablet (5 mg) by mouth all other days or as directed.              I did not stop or change the above medications.  Patient's medication list was updated to reflect medications they are currently taking including medication changes made by other providers.        Thanks,    Tereza Jackson, NIKITA, APRN  08/24/2020         Dictated utilizing Dragon dictation

## 2020-08-25 ENCOUNTER — APPOINTMENT (OUTPATIENT)
Dept: PHARMACY | Facility: HOSPITAL | Age: 85
End: 2020-08-25

## 2020-08-26 ENCOUNTER — HOSPITAL ENCOUNTER (OUTPATIENT)
Dept: CT IMAGING | Facility: HOSPITAL | Age: 85
Discharge: HOME | End: 2020-08-26
Admitting: NURSE PRACTITIONER

## 2020-08-26 LAB
CREAT BLDA-MCNC: 1.1 MG/DL (ref 0.6–1.3)
INR PPP: 2.93

## 2020-08-26 PROCEDURE — 0 IOPAMIDOL PER 1 ML: Performed by: NURSE PRACTITIONER

## 2020-08-26 PROCEDURE — 82565 ASSAY OF CREATININE: CPT

## 2020-08-26 PROCEDURE — 71275 CT ANGIOGRAPHY CHEST: CPT

## 2020-08-26 RX ADMIN — IOPAMIDOL 95 ML: 755 INJECTION, SOLUTION INTRAVENOUS at 13:10

## 2020-08-27 ENCOUNTER — ANTICOAGULATION VISIT (OUTPATIENT)
Dept: PHARMACY | Facility: HOSPITAL | Age: 85
End: 2020-08-27

## 2020-08-27 ENCOUNTER — TELEPHONE (OUTPATIENT)
Dept: CARDIOLOGY | Facility: CLINIC | Age: 85
End: 2020-08-27

## 2020-08-27 DIAGNOSIS — I48.19 ATRIAL FIBRILLATION, PERSISTENT (HCC): ICD-10-CM

## 2020-08-27 DIAGNOSIS — R07.9 CHEST PAIN, UNSPECIFIED TYPE: Primary | ICD-10-CM

## 2020-08-27 NOTE — TELEPHONE ENCOUNTER
CTA chest shows no dissection and stable aortic dilation.      Continues to have renal cysts.  Also with some nonspecific mildly enlarged lymph nodes and stable scarring to right lower lobe.  Multinodular goiter again seen..  Follow-up with primary care provider for noncardiac findings--attention to mildly enlarged lymph nodes was recommended on follow-up, will have her discuss with PCP.    Proceed with repeat stress test.  We will also look at repeating an echo given LVH noted on CTA.  There is also signs of some pulmonary hypertension which she has a known history of, felt to be from untreated sleep apnea, but refused further testing for sleep apnea when saw Dr. Tao.    _______________________________________________      Called and discussed with patient.  She is requesting that I also discussed with her daughter.  I called and discussed with Amina, per patient request and okay per verbal release form.  Discussed findings with her as well and they are going to call Dr. Esparza and have him follow-up on noncardiac findings.  They know that someone should be calling them from the office within the next few days to schedule stress test and echo and if they do not hear anything then they will give us a call back.

## 2020-08-31 NOTE — PROGRESS NOTES
Anticoagulation Clinic Progress Note    Anticoagulation Summary  As of 2020    INR goal:   2.0-3.0   TTR:   80.0 % (1.9 y)   INR used for dosin.93 (2020)   Warfarin maintenance plan:   2.5 mg every Sat; 5 mg all other days   Weekly warfarin total:   32.5 mg   No change documented:   Rusty Interiano RPH   Plan last modified:   Rusty Interiano RPH (2019)   Next INR check:   2020   Priority:   Maintenance   Target end date:   Indefinite    Indications    Atrial fibrillation  persistent (CMS/[facility]) [I48.19]             Anticoagulation Episode Summary     INR check location:       Preferred lab:       Send INR reminders to:    [facility]    Comments:         Anticoagulation Care Providers     Provider Role Specialty Phone number    Mary Grace Culp MD Referring Cardiology [phone]          Clinic Interview:  Patient Findings     Negatives:   Signs/symptoms of thrombosis, Signs/symptoms of bleeding,   Laboratory test error suspected, Change in health, Change in alcohol use,   Change in activity, Upcoming invasive procedure, Emergency department   visit, Upcoming dental procedure, Missed doses, Extra doses, Change in   medications, Change in diet/appetite, Hospital admission, Bruising, Other   complaints      Clinical Outcomes     Negatives:   Major bleeding event, Thromboembolic event,   Anticoagulation-related hospital admission, Anticoagulation-related ED   visit, Anticoagulation-related fatality        INR History:  Anticoagulation Monitoring 2020   INR 2.54 2.9 2.93   INR Date 2020   INR Goal 2.0-3.0 2.0-3.0 2.0-3.0   Trend Same Same Same   Last Week Total 32.5 mg 32.5 mg 32.5 mg   Next Week Total 32.5 mg 32.5 mg 32.5 mg   Sun 5 mg 5 mg 5 mg   Mon 5 mg 5 mg 5 mg   Tue 5 mg 5 mg 5 mg   Wed 5 mg 5 mg 5 mg   Thu 5 mg 5 mg 5 mg   Fri 5 mg 5 mg 5 mg   Sat 2.5 mg 2.5 mg 2.5 mg   Visit Report - - -   Some recent data might be hidden        Plan:  1. INR is Therapeutic today- see above in Anticoagulation Summary. Unsuccessful reaching until 8/31/20. Appears [facility] visited pt despite us requesting to cancel lab draw on 8/26/20.   Will instruct Brittany Villeda to Continue their warfarin regimen- see above in Anticoagulation Summary.  2. Follow up in 4 weeks (9/23/20)  3. They have been instructed to call if any changes in medications, doses, concerns, etc. Patient expresses understanding and has no further questions at this time.    Rusty Interiano [facility]

## 2020-09-01 ENCOUNTER — TELEPHONE (OUTPATIENT)
Dept: CARDIOLOGY | Facility: CLINIC | Age: 85
End: 2020-09-01

## 2020-09-01 NOTE — TELEPHONE ENCOUNTER
Called and spoke with patient.  She has decided not to do any further testing at this time.  Verbalized understanding of risks including potential undiagnosed heart issues leading to heart attack or death.  Recommended that she call if she changes her mind or if she has any new, recurrent, or worsening symptoms prior to next visit.  ER for severe or unrelieved symptoms.  I tried to ask her if she would be willing to schedule 6-month follow-up with Dr. Culp and she is amenable but does not want to follow-up sooner.      Brianna-- she reports she is willing to schedule a six-month follow-up with Dr. Culp if you call her.      Dr. Culp-- ANAHI

## 2020-09-02 ENCOUNTER — TELEPHONE (OUTPATIENT)
Dept: FAMILY MEDICINE CLINIC | Facility: CLINIC | Age: 85
End: 2020-09-02

## 2020-09-02 NOTE — TELEPHONE ENCOUNTER
Patient called in  And stated she is out of  melazine 25 mg  And wanted to know if it can be called in due to her dizziness starting again .   1 3 times a day         Sent to [facility] [address] [place] [address] [address] [place] [address] BYPASS AT [facility] & ([facility]) - [phone]  - [phone]   [phone]        Patient call back [phone]

## 2020-09-03 ENCOUNTER — OFFICE VISIT (OUTPATIENT)
Dept: FAMILY MEDICINE CLINIC | Facility: CLINIC | Age: 85
End: 2020-09-03

## 2020-09-03 DIAGNOSIS — R42 DIZZINESS: Primary | ICD-10-CM

## 2020-09-03 PROCEDURE — 99442 PR PHYS/QHP TELEPHONE EVALUATION 11-20 MIN: CPT | Performed by: FAMILY MEDICINE

## 2020-09-03 NOTE — PROGRESS NOTES
Subjective   Brittany Villeda is a 85 y.o. female.     Chief Complaint   Patient presents with   • Dizziness        History of Present Illness    You have chosen to receive care through a telephone visit. Do you consent to use a telephone visit for your medical care today? Yes    Coronavirus pandemic telehealth visit.  Patient is complaining of some dizziness when she first stands up.  Typically when she stands up fast.  Over the last 3 or 4 days.  She has similar problem in January.  She otherwise feels fine.  She is treated for paroxysmal atrial fibrillation with warfarin.  She does not need rate control.  She states her heart rate is not fast.  Her INR is therapeutic.  She does not feel vertiginous.  No chest pain.  No chest pressure.  Otherwise feels fine.  Is feeling better today.  She is prescribed Lasix 20 mg a day for hypertension.  She is taken for a number of years.        The following portions of the patient's history were reviewed and updated as appropriate: allergies, current medications, past family history, past medical history, past social history, past surgical history and problem list.          Review of Systems   Constitutional: Negative.    Respiratory: Negative.    Cardiovascular: Negative.    Neurological: Positive for light-headedness.   Psychiatric/Behavioral: Negative.        Objective   There were no vitals taken for this visit.  Physical Exam   Constitutional: She is oriented to person, place, and time.   Patient is well-known to me.  She sounds in no acute distress.   Neurological: She is alert and oriented to person, place, and time.       Assessment/Plan   Brittany was seen today for dizziness.    Diagnoses and all orders for this visit:    Dizziness      Dizziness/lightheadedness.  Higher probability of orthostatic hypotension.  She is checking her blood pressure at home.  This morning is 119/77 with a heart rate of 69.  She checks it daily.  These numbers are about the same she  states.  I want her to stop her Lasix.  She will start checking her blood pressure twice a day.  She has an appointment to see me next week.  We will discuss more then.  She will call if her blood pressure gets very high.    Duration of visit 10 minutes

## 2020-09-09 ENCOUNTER — OFFICE VISIT (OUTPATIENT)
Dept: FAMILY MEDICINE CLINIC | Facility: CLINIC | Age: 85
End: 2020-09-09

## 2020-09-09 VITALS
HEIGHT: 64 IN | TEMPERATURE: 97.5 F | BODY MASS INDEX: 39.27 KG/M2 | SYSTOLIC BLOOD PRESSURE: 135 MMHG | OXYGEN SATURATION: 98 % | DIASTOLIC BLOOD PRESSURE: 77 MMHG | HEART RATE: 75 BPM | WEIGHT: 230 LBS

## 2020-09-09 DIAGNOSIS — R42 DIZZINESS: ICD-10-CM

## 2020-09-09 DIAGNOSIS — D47.2 MGUS (MONOCLONAL GAMMOPATHY OF UNKNOWN SIGNIFICANCE): ICD-10-CM

## 2020-09-09 DIAGNOSIS — I10 ESSENTIAL HYPERTENSION: Primary | ICD-10-CM

## 2020-09-09 DIAGNOSIS — I71.21 ASCENDING AORTIC ANEURYSM (HCC): ICD-10-CM

## 2020-09-09 DIAGNOSIS — Z23 NEED FOR VACCINATION: ICD-10-CM

## 2020-09-09 DIAGNOSIS — R59.0 MEDIASTINAL LYMPHADENOPATHY: ICD-10-CM

## 2020-09-09 PROCEDURE — G0008 ADMIN INFLUENZA VIRUS VAC: HCPCS | Performed by: FAMILY MEDICINE

## 2020-09-09 PROCEDURE — 90653 IIV ADJUVANT VACCINE IM: CPT | Performed by: FAMILY MEDICINE

## 2020-09-09 PROCEDURE — 99214 OFFICE O/P EST MOD 30 MIN: CPT | Performed by: FAMILY MEDICINE

## 2020-09-09 NOTE — PROGRESS NOTES
"Wendi Villeda is a 85 y.o. female.     Chief Complaint   Patient presents with   • Dizziness     follow up    • CT SCAN FOLLOW UP        History of Present Illness    Follow-up telephone visit from last week.  She was having some lightheadedness and dizziness when she stood up.  She is on Lasix 20 mg a day for some years for hypertension.  Asked her to stop the Lasix last week.  Her blood pressures running normal.  Averaging 120 systolic.  Normal diastolic.  She is feeling better now.    Thoracic dilated aorta.  Aneurysmal.  Repeat CT angiogram recently revealed no change.  However did demonstrate some mediastinal lymph nodes, mildly enlarged and nonspecific, they were not seen on the previous CT scan from 1 year ago.  She is also followed by hematology for leukopenia and monoclonal gammopathy of unknown significance.  She states she overall feels well.  No fevers.  No night sweats.  Good appetite.  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  I prescribed 20 a few weeks ago she has 18 now.  Does cause some constipation.  The CT scan also showed a goiter.  Appears to be unchanged in size.      The following portions of the patient's history were reviewed and updated as appropriate: allergies, current medications, past family history, past medical history, past social history, past surgical history and problem list.          Review of Systems   Constitutional: Negative.    Respiratory: Negative.    Cardiovascular: Negative.    Musculoskeletal: Negative.    Neurological: Positive for light-headedness.       Objective   Blood pressure 135/77, pulse 75, temperature 97.5 °F (36.4 °C), temperature source Temporal, height 162.6 cm (64.02\"), weight 104 kg (230 lb), SpO2 98 %.  Physical Exam   Constitutional: She is oriented to person, place, and time. She appears well-developed and well-nourished. No distress.   HENT:   Head: Atraumatic. "   Neck: Normal range of motion.   Cardiovascular: Normal rate, regular rhythm, normal heart sounds and intact distal pulses.   Pulmonary/Chest: Effort normal and breath sounds normal.   Musculoskeletal: She exhibits no edema.   Neurological: She is alert and oriented to person, place, and time. Coordination normal.   Skin: Skin is warm and dry.   Psychiatric: She has a normal mood and affect. Her behavior is normal. Judgment and thought content normal.   Nursing note and vitals reviewed.      Assessment/Plan   Brittany was seen today for dizziness and ct scan follow up.    Diagnoses and all orders for this visit:    Essential hypertension    Ascending aortic aneurysm (CMS/HCC)    MGUS (monoclonal gammopathy of unknown significance)    Dizziness        Dizziness.  Resolving.  Likely orthostatic hypotension at home.  She is doing well off the Lasix.  Blood pressure today is just slightly.  No orthostasis in the office today.  I recommend observation.  If symptoms return or going to let us know.    Ascending aortic aneurysmal dilatation.  Unchanged on recent CT angiogram.    Mild nonspecific mediastinal lymphadenopathy not seen on last year CT angiography.  She has a history of leukopenia and monoclonal gammopathy of unknown significance.  This point I recommend repeating a CT scan in 6 months.  The patient and family agrees.  Risks and benefits discussed in some detail.

## 2020-09-18 ENCOUNTER — CLINICAL SUPPORT (OUTPATIENT)
Dept: ORTHOPEDIC SURGERY | Facility: CLINIC | Age: 85
End: 2020-09-18

## 2020-09-18 VITALS — BODY MASS INDEX: 38.93 KG/M2 | HEIGHT: 64 IN | TEMPERATURE: 97.1 F | WEIGHT: 228 LBS

## 2020-09-18 DIAGNOSIS — M19.019 GLENOHUMERAL ARTHRITIS: Primary | ICD-10-CM

## 2020-09-18 PROCEDURE — 20610 DRAIN/INJ JOINT/BURSA W/O US: CPT | Performed by: ORTHOPAEDIC SURGERY

## 2020-09-18 RX ORDER — METHYLPREDNISOLONE ACETATE 80 MG/ML
80 INJECTION, SUSPENSION INTRA-ARTICULAR; INTRALESIONAL; INTRAMUSCULAR; SOFT TISSUE
Status: COMPLETED | OUTPATIENT
Start: 2020-09-18 | End: 2020-09-18

## 2020-09-18 RX ADMIN — METHYLPREDNISOLONE ACETATE 80 MG: 80 INJECTION, SUSPENSION INTRA-ARTICULAR; INTRALESIONAL; INTRAMUSCULAR; SOFT TISSUE at 07:46

## 2020-09-18 NOTE — PROGRESS NOTES
Patient: Brittany Villeda  YOB: 1935  Date of Service: 9/18/2020    Chief Complaints: Bilateral shoulder pain    Subjective:    History of Present Illness: Pt is seen in the office today with complaints of bilateral shoulder pain she has known degenerative changes her primary care we will not clear her for anything surgical at this time which is not surprising shot gave her about 6 weeks relief she would like to repeat it.          Allergies:   Allergies   Allergen Reactions   • Codeine Hallucinations   • Amitriptyline Rash   • Amoxicillin-Pot Clavulanate Rash   • Aspirin Unknown - Low Severity     Patient doesn't know why   • Bactrim [Sulfamethoxazole-Trimethoprim] Rash   • Carisoprodol-Aspirin-Codeine Palpitations   • Iodinated Diagnostic Agents Rash   • Latex Rash   • Naproxen Rash   • Nsaids Unknown - Low Severity     unkknown   • Soma Compound With Codeine [Carisoprodol-Aspirin-Codeine] Rash   • Sulfa Antibiotics Rash   • Tramadol Palpitations     heart races        Medications:   Home Medications:  Current Outpatient Medications on File Prior to Visit   Medication Sig   • Acetaminophen (PAIN RELIEVER PO) Take 1 tablet by mouth As Needed.   • albuterol sulfate  (90 Base) MCG/ACT inhaler Inhale 2 puffs Every 6 (Six) Hours As Needed for Wheezing.   • calcium carbonate-cholecalciferol 500-400 MG-UNIT tablet tablet Take 1 tablet by mouth 2 (two) times a day.   • gabapentin (NEURONTIN) 300 MG capsule Take 1 capsule by mouth 2 (two) times a day.   • HYDROcodone-acetaminophen (NORCO) 5-325 MG per tablet 1/2 - 1 tab po qd AS NEEDED for severe shoulder arthritis   • mesalamine (APRISO) 0.375 g 24 hr capsule TAKE 4 CAPSULES DAILY   • omeprazole (priLOSEC) 20 MG capsule TAKE 1 CAPSULE EVERY DAY   • vitamin B-12 (CYANOCOBALAMIN) 1000 MCG tablet Take 1,000 mcg by mouth Daily.   • warfarin (COUMADIN) 5 MG tablet Take one-half tablet (2.5 mg) by mouth on Sat, and take one tablet (5 mg) by mouth all  other days or as directed.   • Wheat Dextrin (BENEFIBER DRINK MIX PO) Take  by mouth 2 (two) times a day.     No current facility-administered medications on file prior to visit.      Current Medications:  Scheduled Meds:  Continuous Infusions:No current facility-administered medications for this visit.     PRN Meds:.    I have reviewed the patient's medical history in detail and updated the computerized patient record.  Review and summarization of old records include:    Past Medical History:   Diagnosis Date   • Anemia    • Arrhythmia    • Arthritis    • Asthma    • Bradycardia    • Chest pain    • Colitis    • COPD (chronic obstructive pulmonary disease) (CMS/HCC)    • Diastolic dysfunction    • Essential hypertension 5/12/2016   • GERD (gastroesophageal reflux disease)    • Heart block    • Hypertension    • Hypertensive heart disease    • Hyperthyroidism    • Kidney stone    • Leukopenia    • Low back pain    • MGUS (monoclonal gammopathy of unknown significance)    • Nephrolithiasis    • Obesity    • Paroxysmal atrial fibrillation (CMS/HCC)    • Peptic ulceration    • PSVT (paroxysmal supraventricular tachycardia) (CMS/HCC)    • Pulmonary hypertension (CMS/HCC)    • Rectal bleed    • Sleep apnea    • Trifascicular block    • 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)    • Ventricular tachycardia (CMS/HCC)     nonsustained   • Vertigo         Past Surgical History:   Procedure Laterality Date   • BACK SURGERY      lumbar fusion   • CARDIAC ELECTROPHYSIOLOGY PROCEDURE N/A 11/7/2018    Procedure: Pacemaker DC new   [place];  Surgeon: Jesus Granda MD;  Location: [facility] CATH INVASIVE LOCATION;  Service: Cardiology   • CHOLECYSTECTOMY     • COLONOSCOPY  06/16/2014    colitis, cryptitis,  tics, NBIH, TA w/low grade dysplasia   • COLONOSCOPY N/A 10/28/2019    Procedure: COLONOSCOPY WITH COLD AND HOT POLYPECTOMIES;  Surgeon: Micaela English MD;  Location: [facility] ENDOSCOPY;  Service:  Gastroenterology   • HEMORRHOIDECTOMY     • HYSTERECTOMY     • KNEE ARTHROPLASTY     • MYOMECTOMY     • SHOULDER SURGERY     • SINUS SURGERY     • TOE NAIL AMPUTATION  2019   • TONSILLECTOMY          Social History     Occupational History   • Occupation: Caregiver     Employer: RETIRED     Comment: worked for [employer]   Tobacco Use   • Smoking status: Former Smoker     Packs/day: 1.50     Years: 10.00     Pack years: 15.00     Start date:      Quit date:      Years since quittin.7   • Smokeless tobacco: Never Used   • Tobacco comment: QUIT SMOKING    Substance and Sexual Activity   • Alcohol use: No     Comment: Daily caffeine use - one cup of coffee   • Drug use: Defer   • Sexual activity: Defer      Social History     Social History Narrative   • Not on file        Family History   Problem Relation Age of Onset   • Diabetes Mother    • Breast cancer Sister    • Kidney cancer Sister    • Heart disease Sister    • Prostate cancer Brother    • Prostate cancer Brother    • Prostate cancer Brother    • Malig Hyperthermia Neg Hx        ROS: 14 point review of systems was performed and was negative except for documented findings in HPI and today's encounter.     Allergies:   Allergies   Allergen Reactions   • Codeine Hallucinations   • Amitriptyline Rash   • Amoxicillin-Pot Clavulanate Rash   • Aspirin Unknown - Low Severity     Patient doesn't know why   • Bactrim [Sulfamethoxazole-Trimethoprim] Rash   • Carisoprodol-Aspirin-Codeine Palpitations   • Iodinated Diagnostic Agents Rash   • Latex Rash   • Naproxen Rash   • Nsaids Unknown - Low Severity     unkknown   • Soma Compound With Codeine [Carisoprodol-Aspirin-Codeine] Rash   • Sulfa Antibiotics Rash   • Tramadol Palpitations     heart races      Constitutional:  Denies fever, shaking or chills   Eyes:  Denies change in visual acuity   HENT:  Denies nasal congestion or sore throat   Respiratory:  Denies cough or shortness of  breath   Cardiovascular:  Denies chest pain or severe LE edema   GI:  Denies abdominal pain, nausea, vomiting, bloody stools or diarrhea   Musculoskeletal:  Numbness, tingling, or loss of motor function only as noted above in history of present illness.  : Denies painful urination or hematuria  Integument:  Denies rash, lesion or ulceration   Neurologic:  Denies headache or focal weakness  Endocrine:  Denies lymphadenopathy  Psych:  Denies confusion or change in mental status   Hem:  Denies active bleeding      Physical Exam: 85 y.o. female  Wt Readings from Last 3 Encounters:   09/09/20 104 kg (230 lb)   08/24/20 102 kg (224 lb 3.2 oz)   07/22/20 101 kg (222 lb 9.6 oz)       There is no height or weight on file to calculate BMI.  No height and weight on file for this encounter.  There were no vitals filed for this visit.  Vital signs reviewed.   General Appearance:    Alert, cooperative, in no acute distress                    Ortho exam    Exam is unchanged         .time    Assessment: Bilateral shoulder DJD plan is to proceed with injections  Plan:   Follow up as indicated.  Ice, elevate, and rest as needed.  Discussed conservative measures of pain control including ice, bracing.    Hyacinth Lovell M.D.        Large Joint Arthrocentesis: L glenohumeral  Date/Time: 9/18/2020 7:46 AM  Consent given by: patient  Site marked: site marked  Timeout: Immediately prior to procedure a time out was called to verify the correct patient, procedure, equipment, support staff and site/side marked as required   Supporting Documentation  Indications: pain   Procedure Details  Location: shoulder - L glenohumeral  Preparation: Patient was prepped and draped in the usual sterile fashion  Needle size: 22 G  Approach: posterior  Medications administered: 4 mL lidocaine (cardiac); 80 mg methylPREDNISolone acetate 80 MG/ML  Patient tolerance: patient tolerated the procedure well with no immediate complications    Large Joint  Arthrocentesis: R glenohumeral  Date/Time: 9/18/2020 7:46 AM  Consent given by: patient  Site marked: site marked  Timeout: Immediately prior to procedure a time out was called to verify the correct patient, procedure, equipment, support staff and site/side marked as required   Supporting Documentation  Indications: pain   Procedure Details  Location: shoulder - R glenohumeral  Preparation: Patient was prepped and draped in the usual sterile fashion  Needle size: 22 G  Approach: posterior  Medications administered: 4 mL lidocaine (cardiac); 80 mg methylPREDNISolone acetate 80 MG/ML  Patient tolerance: patient tolerated the procedure well with no immediate complications

## 2020-09-23 LAB — INR PPP: 4.87

## 2020-09-24 ENCOUNTER — ANTICOAGULATION VISIT (OUTPATIENT)
Dept: PHARMACY | Facility: HOSPITAL | Age: 85
End: 2020-09-24

## 2020-09-24 DIAGNOSIS — I48.19 ATRIAL FIBRILLATION, PERSISTENT (HCC): ICD-10-CM

## 2020-09-24 NOTE — PROGRESS NOTES
Anticoagulation Clinic Progress Note    Anticoagulation Summary  As of 2020    INR goal:  2.0-3.0   TTR:  77.1 % (2 y)   INR used for dosin.87 (2020)   Warfarin maintenance plan:  2.5 mg every Sat; 5 mg all other days   Weekly warfarin total:  32.5 mg   Plan last modified:  Rusty Interiano RPH (2019)   Next INR check:  2020   Priority:  Maintenance   Target end date:  Indefinite    Indications    Atrial fibrillation  persistent (CMS/HCC) [I48.19]             Anticoagulation Episode Summary     INR check location:      Preferred lab:      Send INR reminders to:  [facility] [place]    Comments:        Anticoagulation Care Providers     Provider Role Specialty Phone number    Mary Grace Culp MD Referring Cardiology [phone]          Clinic Interview:  Patient Findings     Positives:  Change in health, Change in medications    Negatives:  Signs/symptoms of thrombosis, Signs/symptoms of bleeding,   Laboratory test error suspected, Change in alcohol use, Change in   activity, Upcoming invasive procedure, Emergency department visit,   Upcoming dental procedure, Missed doses, Extra doses, Change in   diet/appetite, Hospital admission, Bruising, Other complaints    Comments:  Patient received methylprednisolone and lidocaine injection to   shoulder - R glenohumerol on 20 for pain relief. Reported slight   relief in shoulder pain but that it still painful with certain types of   movements. Denies s/sx of bleeding.         Clinical Outcomes     Negatives:  Major bleeding event, Thromboembolic event,   Anticoagulation-related hospital admission, Anticoagulation-related ED   visit, Anticoagulation-related fatality    Comments:  Patient received methylprednisolone and lidocaine injection to   shoulder - R glenohumerol on 20 for pain relief. Reported slight   relief in shoulder pain but that it still painful with certain types of   movements. Denies s/sx of bleeding.           INR  History:  Anticoagulation Monitoring 8/24/2020 8/27/2020 9/24/2020   INR 2.9 2.93 4.87   INR Date 8/24/2020 8/26/2020 9/23/2020   INR Goal 2.0-3.0 2.0-3.0 2.0-3.0   Trend Same Same Same   Last Week Total 32.5 mg 32.5 mg 32.5 mg   Next Week Total 32.5 mg 32.5 mg 25 mg   Sun 5 mg 5 mg 5 mg   Mon 5 mg 5 mg 5 mg   Tue 5 mg 5 mg 5 mg   Wed 5 mg 5 mg -   Thu 5 mg 5 mg Hold (9/24)   Fri 5 mg 5 mg 2.5 mg (9/25)   Sat 2.5 mg 2.5 mg 2.5 mg   Visit Report - - -   Some recent data might be hidden       Plan:  1. INR is Supratherapeutic today- see above in Anticoagulation Summary.   Will instruct Brittany Villeda to Change their warfarin regimen- see above in Anticoagulation Summary. Hold today, 2.5 mg partial dose on 9/25 then resume current maintenance regimen.   2. Follow up in 1 week  3. Advised to seek emergency care if she notices signs or symptoms of bleeding.   4. Pt has agreed to only be called if INR out of range. They have been instructed to call if any changes in medications, doses, concerns, etc. Patient expresses understanding and has no further questions at this time.    Emmanuel Mike, PharmD

## 2020-10-01 LAB — INR PPP: 2.87

## 2020-10-02 ENCOUNTER — ANTICOAGULATION VISIT (OUTPATIENT)
Dept: PHARMACY | Facility: HOSPITAL | Age: 85
End: 2020-10-02

## 2020-10-02 DIAGNOSIS — I48.19 ATRIAL FIBRILLATION, PERSISTENT (HCC): ICD-10-CM

## 2020-10-02 NOTE — PROGRESS NOTES
Anticoagulation Clinic Progress Note    Anticoagulation Summary  As of 10/2/2020    INR goal:  2.0-3.0   TTR:  76.3 % (2 y)   INR used for dosin.87 (10/1/2020)   Warfarin maintenance plan:  2.5 mg every Sat; 5 mg all other days   Weekly warfarin total:  32.5 mg   No change documented:  Rusty Interiano RPH   Plan last modified:  Rusty Interiano RPH (2019)   Next INR check:  10/14/2020   Priority:  Maintenance   Target end date:  Indefinite    Indications    Atrial fibrillation  persistent (CMS/HCC) [I48.19]             Anticoagulation Episode Summary     INR check location:      Preferred lab:      Send INR reminders to:   [facility]    Comments:        Anticoagulation Care Providers     Provider Role Specialty Phone number    Mary Grace Culp MD Referring Cardiology [phone]          Clinic Interview:  Patient Findings     Negatives:  Signs/symptoms of thrombosis, Signs/symptoms of bleeding,   Laboratory test error suspected, Change in health, Change in alcohol use,   Change in activity, Upcoming invasive procedure, Emergency department   visit, Upcoming dental procedure, Missed doses, Extra doses, Change in   medications, Change in diet/appetite, Hospital admission, Bruising, Other   complaints      Clinical Outcomes     Negatives:  Major bleeding event, Thromboembolic event,   Anticoagulation-related hospital admission, Anticoagulation-related ED   visit, Anticoagulation-related fatality        INR History:  Anticoagulation Monitoring 2020 2020 10/2/2020   INR 2.93 4.87 2.87   INR Date 2020 2020 10/1/2020   INR Goal 2.0-3.0 2.0-3.0 2.0-3.0   Trend Same Same Same   Last Week Total 32.5 mg 32.5 mg 30 mg   Next Week Total 32.5 mg 25 mg 32.5 mg   Sun 5 mg 5 mg 5 mg   Mon 5 mg 5 mg 5 mg   Tue 5 mg 5 mg 5 mg   Wed 5 mg - 5 mg   Thu 5 mg Hold () 5 mg   Fri 5 mg 2.5 mg () 5 mg   Sat 2.5 mg 2.5 mg 2.5 mg   Visit Report - - -   Some recent data might be hidden        Plan:  1. INR is Therapeutic today- see above in Anticoagulation Summary.   Will instruct Brittany Villeda to Continue their warfarin regimen- see above in Anticoagulation Summary.  2. Follow up in 2 weeks  3. They have been instructed to call if any changes in medications, doses, concerns, etc. Patient expresses understanding and has no further questions at this time.    Rusty Interiano RP

## 2020-10-13 RX ORDER — OMEPRAZOLE 20 MG/1
CAPSULE, DELAYED RELEASE ORAL
Qty: 90 CAPSULE | Refills: 0 | Status: SHIPPED | OUTPATIENT
Start: 2020-10-13 | End: 2021-05-14 | Stop reason: HOSPADM

## 2020-10-14 LAB — INR PPP: 3.32

## 2020-10-15 ENCOUNTER — ANTICOAGULATION VISIT (OUTPATIENT)
Dept: PHARMACY | Facility: HOSPITAL | Age: 85
End: 2020-10-15

## 2020-10-15 DIAGNOSIS — I48.19 ATRIAL FIBRILLATION, PERSISTENT (HCC): ICD-10-CM

## 2020-10-15 NOTE — PROGRESS NOTES
Anticoagulation Clinic Progress Note    Anticoagulation Summary  As of 10/15/2020    INR goal:  2.0-3.0   TTR:  75.5 % (2 y)   INR used for dosing:  3.32 (10/14/2020)   Warfarin maintenance plan:  2.5 mg every Tue, Sat; 5 mg all other days   Weekly warfarin total:  30 mg   Plan last modified:  Rusty Interiano RPH (10/15/2020)   Next INR check:  10/28/2020   Priority:  Maintenance   Target end date:  Indefinite    Indications    Atrial fibrillation  persistent (CMS/HCC) [I48.19]             Anticoagulation Episode Summary     INR check location:      Preferred lab:      Send INR reminders to:  [facility]    Comments:        Anticoagulation Care Providers     Provider Role Specialty Phone number    Mary Grace Culp MD Referring Cardiology [phone]          Clinic Interview:  Patient Findings     Negatives:  Signs/symptoms of thrombosis, Signs/symptoms of bleeding,   Laboratory test error suspected, Change in health, Change in alcohol use,   Change in activity, Upcoming invasive procedure, Emergency department   visit, Upcoming dental procedure, Missed doses, Extra doses, Change in   medications, Change in diet/appetite, Hospital admission, Bruising, Other   complaints      Clinical Outcomes     Negatives:  Major bleeding event, Thromboembolic event,   Anticoagulation-related hospital admission, Anticoagulation-related ED   visit, Anticoagulation-related fatality        INR History:  Anticoagulation Monitoring 9/24/2020 10/2/2020 10/15/2020   INR 4.87 2.87 3.32   INR Date 9/23/2020 10/1/2020 10/14/2020   INR Goal 2.0-3.0 2.0-3.0 2.0-3.0   Trend Same Same Down   Last Week Total 32.5 mg 30 mg 32.5 mg   Next Week Total 25 mg 32.5 mg 27.5 mg   Sun 5 mg 5 mg 5 mg   Mon 5 mg 5 mg 5 mg   Tue 5 mg 5 mg 2.5 mg   Wed - 5 mg 5 mg   Thu Hold (9/24) 5 mg 2.5 mg (10/15); Otherwise 5 mg   Fri 2.5 mg (9/25) 5 mg 5 mg   Sat 2.5 mg 2.5 mg 2.5 mg   Visit Report - - -   Some recent data might be hidden       Plan:  1. INR  is Supratherapeutic today- see above in Anticoagulation Summary.   Will instruct Brittany Villeda to Change their warfarin regimen- see above in Anticoagulation Summary.  2. Follow up in 2 weeks  3. They have been instructed to call if any changes in medications, doses, concerns, etc. Patient expresses understanding and has no further questions at this time.    Rusty Interiano [facility]

## 2020-10-19 ENCOUNTER — APPOINTMENT (OUTPATIENT)
Dept: GENERAL RADIOLOGY | Facility: HOSPITAL | Age: 85
End: 2020-10-19

## 2020-10-19 ENCOUNTER — TELEPHONE (OUTPATIENT)
Dept: FAMILY MEDICINE CLINIC | Facility: CLINIC | Age: 85
End: 2020-10-19

## 2020-10-19 ENCOUNTER — HOSPITAL ENCOUNTER (EMERGENCY)
Facility: HOSPITAL | Age: 85
Discharge: HOME | End: 2020-10-19
Attending: EMERGENCY MEDICINE | Admitting: EMERGENCY MEDICINE

## 2020-10-19 ENCOUNTER — APPOINTMENT (OUTPATIENT)
Dept: CARDIOLOGY | Facility: HOSPITAL | Age: 85
End: 2020-10-19

## 2020-10-19 VITALS
SYSTOLIC BLOOD PRESSURE: 151 MMHG | HEART RATE: 70 BPM | OXYGEN SATURATION: 96 % | TEMPERATURE: 98.9 F | DIASTOLIC BLOOD PRESSURE: 86 MMHG

## 2020-10-19 DIAGNOSIS — I50.32 CHRONIC DIASTOLIC HEART FAILURE (HCC): ICD-10-CM

## 2020-10-19 DIAGNOSIS — I27.20 PULMONARY HYPERTENSION (HCC): ICD-10-CM

## 2020-10-19 DIAGNOSIS — R60.0 BILATERAL LEG EDEMA: Primary | ICD-10-CM

## 2020-10-19 LAB
ALBUMIN SERPL-MCNC: 3.7 G/DL (ref 3.5–5.2)
ALBUMIN/GLOB SERPL: 1 G/DL
ALP SERPL-CCNC: 75 U/L (ref 39–117)
ALT SERPL W P-5'-P-CCNC: 8 U/L (ref 1–33)
ANION GAP SERPL CALCULATED.3IONS-SCNC: 8.5 MMOL/L (ref 5–15)
ANISOCYTOSIS BLD QL: ABNORMAL
AST SERPL-CCNC: 18 U/L (ref 1–32)
BH CV LOWER VASCULAR LEFT COMMON FEMORAL AUGMENT: NORMAL
BH CV LOWER VASCULAR LEFT COMMON FEMORAL COMPETENT: NORMAL
BH CV LOWER VASCULAR LEFT COMMON FEMORAL COMPRESS: NORMAL
BH CV LOWER VASCULAR LEFT COMMON FEMORAL PHASIC: NORMAL
BH CV LOWER VASCULAR LEFT COMMON FEMORAL SPONT: NORMAL
BH CV LOWER VASCULAR LEFT DISTAL FEMORAL COMPRESS: NORMAL
BH CV LOWER VASCULAR LEFT GASTRONEMIUS COMPRESS: NORMAL
BH CV LOWER VASCULAR LEFT GREATER SAPH AK COMPRESS: NORMAL
BH CV LOWER VASCULAR LEFT GREATER SAPH BK COMPRESS: NORMAL
BH CV LOWER VASCULAR LEFT LESSER SAPH COMPRESS: NORMAL
BH CV LOWER VASCULAR LEFT MID FEMORAL AUGMENT: NORMAL
BH CV LOWER VASCULAR LEFT MID FEMORAL COMPETENT: NORMAL
BH CV LOWER VASCULAR LEFT MID FEMORAL COMPRESS: NORMAL
BH CV LOWER VASCULAR LEFT MID FEMORAL PHASIC: NORMAL
BH CV LOWER VASCULAR LEFT MID FEMORAL SPONT: NORMAL
BH CV LOWER VASCULAR LEFT PERONEAL COMPRESS: NORMAL
BH CV LOWER VASCULAR LEFT POPLITEAL AUGMENT: NORMAL
BH CV LOWER VASCULAR LEFT POPLITEAL COMPETENT: NORMAL
BH CV LOWER VASCULAR LEFT POPLITEAL COMPRESS: NORMAL
BH CV LOWER VASCULAR LEFT POPLITEAL PHASIC: NORMAL
BH CV LOWER VASCULAR LEFT POPLITEAL SPONT: NORMAL
BH CV LOWER VASCULAR LEFT POSTERIOR TIBIAL COMPRESS: NORMAL
BH CV LOWER VASCULAR LEFT PROXIMAL FEMORAL COMPRESS: NORMAL
BH CV LOWER VASCULAR LEFT SAPHENOFEMORAL JUNCTION COMPRESS: NORMAL
BH CV LOWER VASCULAR RIGHT COMMON FEMORAL AUGMENT: NORMAL
BH CV LOWER VASCULAR RIGHT COMMON FEMORAL COMPETENT: NORMAL
BH CV LOWER VASCULAR RIGHT COMMON FEMORAL COMPRESS: NORMAL
BH CV LOWER VASCULAR RIGHT COMMON FEMORAL PHASIC: NORMAL
BH CV LOWER VASCULAR RIGHT COMMON FEMORAL SPONT: NORMAL
BH CV LOWER VASCULAR RIGHT DISTAL FEMORAL COMPRESS: NORMAL
BH CV LOWER VASCULAR RIGHT GASTRONEMIUS COMPRESS: NORMAL
BH CV LOWER VASCULAR RIGHT GREATER SAPH AK COMPRESS: NORMAL
BH CV LOWER VASCULAR RIGHT GREATER SAPH BK COMPRESS: NORMAL
BH CV LOWER VASCULAR RIGHT LESSER SAPH COMPRESS: NORMAL
BH CV LOWER VASCULAR RIGHT MID FEMORAL AUGMENT: NORMAL
BH CV LOWER VASCULAR RIGHT MID FEMORAL COMPETENT: NORMAL
BH CV LOWER VASCULAR RIGHT MID FEMORAL COMPRESS: NORMAL
BH CV LOWER VASCULAR RIGHT MID FEMORAL PHASIC: NORMAL
BH CV LOWER VASCULAR RIGHT MID FEMORAL SPONT: NORMAL
BH CV LOWER VASCULAR RIGHT PERONEAL COMPRESS: NORMAL
BH CV LOWER VASCULAR RIGHT POPLITEAL AUGMENT: NORMAL
BH CV LOWER VASCULAR RIGHT POPLITEAL COMPETENT: NORMAL
BH CV LOWER VASCULAR RIGHT POPLITEAL COMPRESS: NORMAL
BH CV LOWER VASCULAR RIGHT POPLITEAL PHASIC: NORMAL
BH CV LOWER VASCULAR RIGHT POPLITEAL SPONT: NORMAL
BH CV LOWER VASCULAR RIGHT POSTERIOR TIBIAL COMPRESS: NORMAL
BH CV LOWER VASCULAR RIGHT PROXIMAL FEMORAL COMPRESS: NORMAL
BH CV LOWER VASCULAR RIGHT SAPHENOFEMORAL JUNCTION COMPRESS: NORMAL
BILIRUB SERPL-MCNC: 1.4 MG/DL (ref 0–1.2)
BUN SERPL-MCNC: 11 MG/DL (ref 8–23)
BUN/CREAT SERPL: 13.3 (ref 7–25)
BURR CELLS BLD QL SMEAR: ABNORMAL
CALCIUM SPEC-SCNC: 10.4 MG/DL (ref 8.6–10.5)
CHLORIDE SERPL-SCNC: 108 MMOL/L (ref 98–107)
CO2 SERPL-SCNC: 24.5 MMOL/L (ref 22–29)
CREAT SERPL-MCNC: 0.83 MG/DL (ref 0.57–1)
DEPRECATED RDW RBC AUTO: 46 FL (ref 37–54)
ERYTHROCYTE [DISTWIDTH] IN BLOOD BY AUTOMATED COUNT: 13.6 % (ref 12.3–15.4)
GFR SERPL CREATININE-BSD FRML MDRD: 79 ML/MIN/1.73
GLOBULIN UR ELPH-MCNC: 3.7 GM/DL
GLUCOSE SERPL-MCNC: 96 MG/DL (ref 65–99)
HCT VFR BLD AUTO: 36.1 % (ref 34–46.6)
HGB BLD-MCNC: 12.1 G/DL (ref 12–15.9)
HOLD SPECIMEN: NORMAL
HOLD SPECIMEN: NORMAL
INR PPP: 2.69 (ref 0.9–1.1)
LYMPHOCYTES # BLD MANUAL: 1.99 10*3/MM3 (ref 0.7–3.1)
LYMPHOCYTES NFR BLD MANUAL: 54.5 % (ref 19.6–45.3)
LYMPHOCYTES NFR BLD MANUAL: 9.1 % (ref 5–12)
MACROCYTES BLD QL SMEAR: ABNORMAL
MCH RBC QN AUTO: 31 PG (ref 26.6–33)
MCHC RBC AUTO-ENTMCNC: 33.5 G/DL (ref 31.5–35.7)
MCV RBC AUTO: 92.6 FL (ref 79–97)
MONOCYTES # BLD AUTO: 0.33 10*3/MM3 (ref 0.1–0.9)
NEUTROPHILS # BLD AUTO: 1.33 10*3/MM3 (ref 1.7–7)
NEUTROPHILS NFR BLD MANUAL: 36.4 % (ref 42.7–76)
NT-PROBNP SERPL-MCNC: 1017 PG/ML (ref 0–1800)
PLAT MORPH BLD: NORMAL
PLATELET # BLD AUTO: 181 10*3/MM3 (ref 140–450)
PMV BLD AUTO: 9 FL (ref 6–12)
POIKILOCYTOSIS BLD QL SMEAR: ABNORMAL
POTASSIUM SERPL-SCNC: 4.2 MMOL/L (ref 3.5–5.2)
PROT SERPL-MCNC: 7.4 G/DL (ref 6–8.5)
PROTHROMBIN TIME: 27.7 SECONDS (ref 11.7–14.2)
RBC # BLD AUTO: 3.9 10*6/MM3 (ref 3.77–5.28)
SMUDGE CELLS BLD QL SMEAR: ABNORMAL
SODIUM SERPL-SCNC: 141 MMOL/L (ref 136–145)
TROPONIN T SERPL-MCNC: <0.01 NG/ML (ref 0–0.03)
WBC # BLD AUTO: 3.65 10*3/MM3 (ref 3.4–10.8)
WHOLE BLOOD HOLD SPECIMEN: NORMAL
WHOLE BLOOD HOLD SPECIMEN: NORMAL

## 2020-10-19 PROCEDURE — 80053 COMPREHEN METABOLIC PANEL: CPT | Performed by: EMERGENCY MEDICINE

## 2020-10-19 PROCEDURE — 93005 ELECTROCARDIOGRAM TRACING: CPT | Performed by: EMERGENCY MEDICINE

## 2020-10-19 PROCEDURE — 71046 X-RAY EXAM CHEST 2 VIEWS: CPT

## 2020-10-19 PROCEDURE — 83880 ASSAY OF NATRIURETIC PEPTIDE: CPT | Performed by: EMERGENCY MEDICINE

## 2020-10-19 PROCEDURE — 85610 PROTHROMBIN TIME: CPT | Performed by: EMERGENCY MEDICINE

## 2020-10-19 PROCEDURE — 93010 ELECTROCARDIOGRAM REPORT: CPT | Performed by: INTERNAL MEDICINE

## 2020-10-19 PROCEDURE — 85025 COMPLETE CBC W/AUTO DIFF WBC: CPT | Performed by: EMERGENCY MEDICINE

## 2020-10-19 PROCEDURE — 96374 THER/PROPH/DIAG INJ IV PUSH: CPT

## 2020-10-19 PROCEDURE — 84484 ASSAY OF TROPONIN QUANT: CPT | Performed by: EMERGENCY MEDICINE

## 2020-10-19 PROCEDURE — 85007 BL SMEAR W/DIFF WBC COUNT: CPT | Performed by: EMERGENCY MEDICINE

## 2020-10-19 PROCEDURE — 25010000002 HYDROMORPHONE PER 4 MG: Performed by: EMERGENCY MEDICINE

## 2020-10-19 PROCEDURE — 99284 EMERGENCY DEPT VISIT MOD MDM: CPT

## 2020-10-19 PROCEDURE — 93970 EXTREMITY STUDY: CPT

## 2020-10-19 RX ORDER — HYDROMORPHONE HYDROCHLORIDE 1 MG/ML
0.5 INJECTION, SOLUTION INTRAMUSCULAR; INTRAVENOUS; SUBCUTANEOUS ONCE
Status: COMPLETED | OUTPATIENT
Start: 2020-10-19 | End: 2020-10-19

## 2020-10-19 RX ORDER — SODIUM CHLORIDE 0.9 % (FLUSH) 0.9 %
10 SYRINGE (ML) INJECTION AS NEEDED
Status: DISCONTINUED | OUTPATIENT
Start: 2020-10-19 | End: 2020-10-19 | Stop reason: HOSPADM

## 2020-10-19 RX ORDER — FUROSEMIDE 20 MG/1
20 TABLET ORAL DAILY
Qty: 30 TABLET | Refills: 0 | Status: SHIPPED | OUTPATIENT
Start: 2020-10-19 | End: 2020-10-26

## 2020-10-19 RX ADMIN — HYDROMORPHONE HYDROCHLORIDE 0.5 MG: 1 INJECTION, SOLUTION INTRAMUSCULAR; INTRAVENOUS; SUBCUTANEOUS at 19:24

## 2020-10-19 NOTE — ED NOTES
Patient to er from home with c/o leg swelling that started a few days ago and getting worse. Patient has mask on in triage along with staff.     Javid Nieto, RN  10/19/20 0178

## 2020-10-19 NOTE — ED PROVIDER NOTES
EMERGENCY DEPARTMENT ENCOUNTER    Room Number:  31/31  Date of encounter:  10/19/2020  PCP: Mega Esparza MD  Historian: Patient, son      HPI:  Chief Complaint: Leg pain  A complete HPI/ROS/PMH/PSH/SH/FH are unobtainable due to: None    Context: Brittany Villeda is a 85 y.o. female who presents to the ED c/o right leg pain.  Onset 6 days ago.  Pain is constant, severe, and sharp.  Nothing makes this better or worse.  She feels that her legs are swollen, primarily on the right side.  She has pain radiates from her calf up into her hip.  She denies have any chest pain or shortness of breath.  She denies having history of prior leg swelling.      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   • Leukopenia 09/12/2016   • MGUS (monoclonal gammopathy of unknown significance) 09/16/2016   • 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   • Other chest pain 12/24/2017   • Lichen sclerosus 08/23/2017   • Heart block 10/30/2018   • Sleep-related hypoxia 12/22/2018   • Bradycardia 12/29/2018   • Shoulder arthritis 01/28/2019   • History of atrial fibrillation 03/19/2019   • Pacemaker 03/19/2019   • Paroxysmal SVT (supraventricular tachycardia) (CMS/HCC) 05/08/2019   • History of chest pain 05/08/2019   • Palpitations 05/08/2019   • Atrial fibrillation, persistent (CMS/HCC) 10/06/2019   • Rectal bleeding 10/15/2019   • Arthritis 12/13/2019   • Ascending aortic aneurysm (CMS/HCC) 08/24/2020   • Mediastinal lymphadenopathy 09/09/2020      Resolved Ambulatory Problems     Diagnosis Date Noted   • Abnormal weight loss 05/12/2016   • Tachycardia 05/12/2016   • Nausea 05/12/2016   • Hyperthyroidism 05/12/2016   • Fatigue 05/12/2016   • Dizziness 05/12/2016   • Diarrhea 05/12/2016   • Abnormal thyroid stimulating hormone (TSH) level 05/12/2016   • Abdominal pain 05/12/2016   • Abnormal EKG 06/30/2016     Past Medical History:   Diagnosis Date   • Anemia    • Arrhythmia    • Asthma    • Chest pain    • Colitis    • COPD (chronic obstructive pulmonary disease) (CMS/HCC)    • GERD (gastroesophageal reflux disease)    • Hypertension    • Hypertensive heart disease    • Kidney stone    • Low back pain    • Nephrolithiasis    • Obesity    • Paroxysmal atrial fibrillation (CMS/HCC)    • Peptic ulceration    • PSVT (paroxysmal supraventricular tachycardia) (CMS/HCC)    • Rectal bleed    • Sleep apnea    • Ventricular tachycardia (CMS/HCC)    • Vertigo          PAST SURGICAL HISTORY  Past Surgical History:   Procedure Laterality Date   • BACK SURGERY      lumbar fusion   • CARDIAC ELECTROPHYSIOLOGY PROCEDURE N/A 11/7/2018    Procedure: Pacemaker DC new   [place];  Surgeon: Jesus Granda MD;  Location: [facility] CATH INVASIVE LOCATION;  Service: Cardiology   • CHOLECYSTECTOMY     • COLONOSCOPY  06/16/2014    colitis, cryptitis,  tics, NBIH, TA w/low grade dysplasia   • COLONOSCOPY N/A 10/28/2019    Procedure: COLONOSCOPY WITH COLD AND HOT POLYPECTOMIES;  Surgeon: Micaela English MD;  Location: [facility] ENDOSCOPY;  Service: Gastroenterology   • HEMORRHOIDECTOMY     • HYSTERECTOMY     • KNEE ARTHROPLASTY     • MYOMECTOMY     • SHOULDER SURGERY     • SINUS SURGERY     • TOE NAIL AMPUTATION  03/04/2019   • TONSILLECTOMY           FAMILY HISTORY  Family History   Problem Relation Age of Onset   • Diabetes Mother    • Breast cancer Sister    • Kidney cancer Sister    • Heart disease Sister    • Prostate cancer Brother    • Prostate cancer Brother    • Prostate  cancer Brother    • Malig Hyperthermia Neg Hx          SOCIAL HISTORY  Social History     Socioeconomic History   • Marital status:      Spouse name: Not on file   • Number of children: 10   • Years of education: High School   • Highest education level: Not on file   Occupational History   • Occupation: Caregiver     Employer: RETIRED     Comment: worked for [employer]   Tobacco Use   • Smoking status: Former Smoker     Packs/day: 1.50     Years: 10.00     Pack years: 15.00     Start date:      Quit date:      Years since quittin.8   • Smokeless tobacco: Never Used   • Tobacco comment: QUIT SMOKING    Substance and Sexual Activity   • Alcohol use: No     Comment: Daily caffeine use - one cup of coffee   • Drug use: Defer   • Sexual activity: Defer         ALLERGIES  Codeine, Amitriptyline, Amoxicillin-pot clavulanate, Aspirin, Bactrim [sulfamethoxazole-trimethoprim], Carisoprodol-aspirin-codeine, Iodinated diagnostic agents, Latex, Naproxen, Nsaids, Soma compound with codeine [carisoprodol-aspirin-codeine], Sulfa antibiotics, and Tramadol        REVIEW OF SYSTEMS  Review of Systems     All systems reviewed and negative except for those discussed in HPI.       PHYSICAL EXAM    I have reviewed the triage vital signs and nursing notes.    ED Triage Vitals [10/19/20 1603]   Temp Heart Rate Resp BP SpO2   98.9 °F (37.2 °C) 82 -- -- 97 %      Temp src Heart Rate Source Patient Position BP Location FiO2 (%)   Tympanic -- -- -- --       Physical Exam  GENERAL: not distressed  HENT: nares patent  EYES: no scleral icterus  CV: regular rhythm, regular rate, 2+ DP pulses bilaterally, warm feet bilaterally  RESPIRATORY: normal effort, clear to auscultation bilaterally  ABDOMEN: soft, nontender, obese abdomen  MUSCULOSKELETAL: no deformity, 1+ edema to the bilateral lower extremities although the right leg is slightly larger circumferentially than the left at the calf   NEURO: alert, moves  all extremities, follows commands  SKIN: warm, dry, there is bruising to the feet bilaterally        LAB RESULTS  Recent Results (from the past 24 hour(s))   Protime-INR    Collection Time: 10/19/20  6:42 PM    Specimen: Blood   Result Value Ref Range    Protime 27.7 (H) 11.7 - 14.2 Seconds    INR 2.69 (H) 0.90 - 1.10   Light Blue Top    Collection Time: 10/19/20  6:42 PM   Result Value Ref Range    Extra Tube hold for add-on    Green Top (Gel)    Collection Time: 10/19/20  6:42 PM   Result Value Ref Range    Extra Tube Hold for add-ons.    Lavender Top    Collection Time: 10/19/20  6:42 PM   Result Value Ref Range    Extra Tube hold for add-on    Gold Top - SST    Collection Time: 10/19/20  6:42 PM   Result Value Ref Range    Extra Tube Hold for add-ons.    Comprehensive Metabolic Panel    Collection Time: 10/19/20  6:42 PM    Specimen: Blood   Result Value Ref Range    Glucose 96 65 - 99 mg/dL    BUN 11 8 - 23 mg/dL    Creatinine 0.83 0.57 - 1.00 mg/dL    Sodium 141 136 - 145 mmol/L    Potassium 4.2 3.5 - 5.2 mmol/L    Chloride 108 (H) 98 - 107 mmol/L    CO2 24.5 22.0 - 29.0 mmol/L    Calcium 10.4 8.6 - 10.5 mg/dL    Total Protein 7.4 6.0 - 8.5 g/dL    Albumin 3.70 3.50 - 5.20 g/dL    ALT (SGPT) 8 1 - 33 U/L    AST (SGOT) 18 1 - 32 U/L    Alkaline Phosphatase 75 39 - 117 U/L    Total Bilirubin 1.4 (H) 0.0 - 1.2 mg/dL    eGFR  African Amer 79 >60 mL/min/1.73    Globulin 3.7 gm/dL    A/G Ratio 1.0 g/dL    BUN/Creatinine Ratio 13.3 7.0 - 25.0    Anion Gap 8.5 5.0 - 15.0 mmol/L   BNP    Collection Time: 10/19/20  6:42 PM    Specimen: Blood   Result Value Ref Range    proBNP 1,017.0 0.0-1,800.0 pg/mL   Troponin    Collection Time: 10/19/20  6:42 PM    Specimen: Blood   Result Value Ref Range    Troponin T <0.010 0.000 - 0.030 ng/mL   CBC Auto Differential    Collection Time: 10/19/20  6:42 PM    Specimen: Blood   Result Value Ref Range    WBC 3.65 3.40 - 10.80 10*3/mm3    RBC 3.90 3.77 - 5.28 10*6/mm3    Hemoglobin  12.1 12.0 - 15.9 g/dL    Hematocrit 36.1 34.0 - 46.6 %    MCV 92.6 79.0 - 97.0 fL    MCH 31.0 26.6 - 33.0 pg    MCHC 33.5 31.5 - 35.7 g/dL    RDW 13.6 12.3 - 15.4 %    RDW-SD 46.0 37.0 - 54.0 fl    MPV 9.0 6.0 - 12.0 fL    Platelets 181 140 - 450 10*3/mm3   Manual Differential    Collection Time: 10/19/20  6:42 PM    Specimen: Blood   Result Value Ref Range    Neutrophil % 36.4 (L) 42.7 - 76.0 %    Lymphocyte % 54.5 (H) 19.6 - 45.3 %    Monocyte % 9.1 5.0 - 12.0 %    Neutrophils Absolute 1.33 (L) 1.70 - 7.00 10*3/mm3    Lymphocytes Absolute 1.99 0.70 - 3.10 10*3/mm3    Monocytes Absolute 0.33 0.10 - 0.90 10*3/mm3    Anisocytosis Mod/2+ None Seen    Dee Cells Mod/2+ None Seen    Macrocytes Mod/2+ None Seen    Poikilocytes Mod/2+ None Seen    Smudge Cells Slight/1+ None Seen    Platelet Morphology Normal Normal   Duplex Venous Lower Extremity - Bilateral    Collection Time: 10/19/20  7:40 PM   Result Value Ref Range    Right Common Femoral Spont Y     Right Common Femoral Phasic Y     Right Common Femoral Augment Y     Right Common Femoral Competent Y     Right Common Femoral Compress C     Right Saphenofemoral Junction Compress C     Right Proximal Femoral Compress C     Right Mid Femoral Spont Y     Right Mid Femoral Phasic Y     Right Mid Femoral Augment Y     Right Mid Femoral Competent Y     Right Mid Femoral Compress C     Right Distal Femoral Compress C     Right Popliteal Spont Y     Right Popliteal Phasic Y     Right Popliteal Augment Y     Right Popliteal Competent Y     Right Popliteal Compress C     Right Posterior Tibial Compress C     Right Peroneal Compress C     Right GastronemiusSoleal Compress C     Right Greater Saph AK Compress C     Right Greater Saph BK Compress C     Right Lesser Saph Compress C     Left Common Femoral Spont Y     Left Common Femoral Phasic Y     Left Common Femoral Augment Y     Left Common Femoral Competent Y     Left Common Femoral Compress C     Left Saphenofemoral Junction  Compress C     Left Proximal Femoral Compress C     Left Mid Femoral Spont Y     Left Mid Femoral Phasic Y     Left Mid Femoral Augment Y     Left Mid Femoral Competent Y     Left Mid Femoral Compress C     Left Distal Femoral Compress C     Left Popliteal Spont Y     Left Popliteal Phasic Y     Left Popliteal Augment Y     Left Popliteal Competent Y     Left Popliteal Compress C     Left Posterior Tibial Compress C     Left Peroneal Compress C     Left GastronemiusSoleal Compress C     Left Greater Saph AK Compress C     Left Greater Saph BK Compress C     Left Lesser Saph Compress C        Ordered the above labs and independently reviewed the results.        RADIOLOGY  Xr Chest 2 View    Result Date: 10/19/2020  XR CHEST 2 VW-  HISTORY: Female who is 85 years-old,  leg swelling  TECHNIQUE: Frontal and lateral views of the chest  COMPARISON: 07/19/2019  FINDINGS: The heart size is borderline. Aorta is tortuous. Left-sided pacemaker and cardiac leads are seen. Mild pulmonary vascular congestion is apparent.  No focal pulmonary consolidation, pleural effusion, or pneumothorax. Right hemidiaphragm is elevated. No acute osseous process.      No focal pulmonary consolidation. Tortuous aorta.  This report was finalized on 10/19/2020 8:07 PM by Dr. Nas Maharaj M.D.        I ordered the above noted radiological studies. Reviewed by me and discussed with radiologist.  See dictation for official radiology interpretation.      PROCEDURES    Procedures      MEDICATIONS GIVEN IN ER    Medications   sodium chloride 0.9 % flush 10 mL (has no administration in time range)   HYDROmorphone (DILAUDID) injection 0.5 mg (0.5 mg Intravenous Given 10/19/20 1924)         PROGRESS, DATA ANALYSIS, CONSULTS, AND MEDICAL DECISION MAKING    All labs have been independently reviewed by me.  All radiology studies have been reviewed by me and discussed with radiologist dictating the report.   EKG's independently viewed and interpreted by  me.  Discussion below represents my analysis of pertinent findings related to patient's condition, differential diagnosis, treatment plan and final disposition.    Differential diagnosis includes coagulopathy, DVT, arterial injury, CHF, cirrhosis, nephrosis.    ED Course as of Oct 19 2216   Mon Oct 19, 2020   1854 EKG interpreted by myself.  Time 1835.  Atrial fibrillation.  Heart rate 70.  Left bundle branch block.    [TD]   1855 On medical chart review, old EKG obtained from 1/26/2020.  atrial fibrillation.  Heart rate 70.  Ventricularly paced rhythm.    [TD]   1957 proBNP: 1,017.0 [TD]   1957 Troponin T: <0.010 [TD]   1957 INR(!): 2.69 [TD]   1957 Creatinine: 0.83 [TD]   1957 Sodium: 141 [TD]   1957 Potassium: 4.2 [TD]   2017 Chest x-ray interpreted by myself.  There is no evidence of pneumonia.  She has borderline cardiomegaly.    [TD]   2031 She used to be taking furosemide 40 mg in the morning 20 mg in evening.  However, her PCP discontinued this about 6 weeks ago due to having issues with dizziness.    [TD]      ED Course User Index  [TD] Jimy Caballero II, MD     I am instructed patient on 20 mg of furosemide daily.  I will have her follow-up with her PCP in 1 week to reassess her symptoms.  I want to make sure that you not over diurese her given her prior intolerance.  However, I do believe that given her history of pulmonary hypertension and CHF that she needs to be back on some diuretic.    I gave the patient and family good return precautions.  They are in agreement with the plan.    PPE: Both the patient and I wore a surgical mask throughout the entire patient encounter. I wore protective goggles.     AS OF 22:16 EDT VITALS:    BP - 151/86  HR - 70  TEMP - 98.9 °F (37.2 °C) (Tympanic)  O2 SATS - 96%        DIAGNOSIS  Final diagnoses:   Bilateral leg edema   Pulmonary hypertension (CMS/HCC)   Chronic diastolic heart failure (CMS/HCC)         DISPOSITION  DISCHARGE    FOLLOW-UP  Mega Esparza,  MD  [address]  [address]  [facility] [address]  [phone]    Schedule an appointment as soon as possible for a visit in 1 week  for adjustment of your water pill (furosemide)    [facility] Emergency Department  [address] [facility] [address] [phone]  Go to   immmediately if symptoms worsen         Medication List      New Prescriptions    furosemide 20 MG tablet  Commonly known as: LASIX  Take 1 tablet by mouth Daily for 30 days.           Where to Get Your Medications      You can get these medications from any pharmacy    Bring a paper prescription for each of these medications  · furosemide 20 MG tablet                  Jimy Caballero II, MD  10/19/20 6824

## 2020-10-19 NOTE — TELEPHONE ENCOUNTER
PT WANTS SOME ADVICE FROM DR. RANDHAWA REGARDING HER LEGS SWELLING AND THE DISCOLORATION OF HER FEET.

## 2020-10-19 NOTE — TELEPHONE ENCOUNTER
Please ask her if she is having shortness of breath, fever, hot red legs, or other severe concerns.  Otherwise we may be able to restart her Lasix.

## 2020-10-20 ENCOUNTER — PATIENT OUTREACH (OUTPATIENT)
Dept: CASE MANAGEMENT | Facility: OTHER | Age: 85
End: 2020-10-20

## 2020-10-20 NOTE — OUTREACH NOTE
Care Plan Note      Responses   Lifestyle Goals  Routine follow-up with doctor(s), Medication management, Fewer ER/urgent care visits, Fewer exacerbations   Barriers  Transportation issues [Patient's children transport patient to appointments. ]   Self Management  Medication Adherence   Suggested Appointments  Other (See Comment) [Patient requests AC assist in scheduling follow up appointment with Dr. Esparza. ]   Annual Wellness Visit:   Patient Will Schedule [Scheduled for December. ]   Specific Disease Process Teaching  Heart Failure [Take Lasix as directed and follow up with Dr. Esparza]   Does patient have depression diagnosis?  No   Advanced Directives:  Patient Has   Ed Visits past 12 months:  2 or 3   Hospitalizations past 12 months  1   Discharge destination:  Home   Medication Adherence  Medications understood   Goal Progress  Making Progress Toward Goal(s)   Readiness Scale  6   Confidence Scale  6   Health Literacy  Good        The main concerns and/or symptoms the patient would like to address are: Spoke with patient regarding her ED visit and her health and wellness. Patient states she is taking her Lasix as directed. Patient's son drove her to the ED but she would like her daughter to drive her to the follow up. Patient requests [facility] assistance in scheduling a follow up with Dr. Esparza in the early morning. .    Education/instruction provided by Care Coordinator: Introduced self, explained ACM RN role and provided contact information. Reviewed patient's follow up appointment. Patient has no scheduled but plans to use her daughter as transportation. Patient states she is taking her Lasix as directed. No other questions or concerns at this time. Follow up outreach scheduled.     Follow Up Outreach Due: 1 week    Ruth Encinas RN  Ambulatory     10/20/2020, 14:56 EDT

## 2020-10-20 NOTE — OUTREACH NOTE
Care Coordination Note    Spoke with Beatrice at Dr. Esparza's office. A hospital follow up appointment scheduled for 10/26 at 9:30 AM has been set and communicated to the patient.     Ruth Encinas RN  Ambulatory     10/20/2020, 15:03 EDT

## 2020-10-26 ENCOUNTER — OFFICE VISIT (OUTPATIENT)
Dept: FAMILY MEDICINE CLINIC | Facility: CLINIC | Age: 85
End: 2020-10-26

## 2020-10-26 VITALS
HEIGHT: 64 IN | DIASTOLIC BLOOD PRESSURE: 71 MMHG | SYSTOLIC BLOOD PRESSURE: 145 MMHG | BODY MASS INDEX: 38.76 KG/M2 | HEART RATE: 71 BPM | TEMPERATURE: 98 F | OXYGEN SATURATION: 99 % | WEIGHT: 227 LBS

## 2020-10-26 DIAGNOSIS — M19.019 SHOULDER ARTHRITIS: ICD-10-CM

## 2020-10-26 DIAGNOSIS — I48.19 ATRIAL FIBRILLATION, PERSISTENT (HCC): ICD-10-CM

## 2020-10-26 DIAGNOSIS — I27.20 PULMONARY HYPERTENSION (HCC): Primary | ICD-10-CM

## 2020-10-26 PROCEDURE — 99214 OFFICE O/P EST MOD 30 MIN: CPT | Performed by: FAMILY MEDICINE

## 2020-10-26 RX ORDER — FUROSEMIDE 40 MG/1
40 TABLET ORAL DAILY
Qty: 90 TABLET | Refills: 1 | Status: SHIPPED | OUTPATIENT
Start: 2020-10-26 | End: 2020-12-23

## 2020-10-26 NOTE — PROGRESS NOTES
"Subjective   Brittany Villeda is a 85 y.o. female.     Chief Complaint   Patient presents with   • Leg Swelling     hospital f/u, x 1 wk         History of Present Illness    Follow-up leg swelling, emergency room visit.    2 months ago patient was having orthostatic symptoms.  She was on Lasix for a number of years.  I stopped the Lasix.  She did fine until last week, when she started having bilateral ankle swelling that was uncomfortable for her.  She felt maybe a little more shortness of breath than usual.  Also feeling tired.  Her kidney function has always been remarkably good.  I was out of the office.  They went to the emergency room.  The chest x-ray showed some mild vascular congestion but no infiltrate.  BNP was borderline elevated.  Her EKG revealed paced atrial fibrillation.  Her INR was elevated 2.9.  She continues warfarin for the persistent atrial fibrillation.  They restarted low-dose Lasix 20 mg a day.  Since then the leg swelling is come back down but still somewhat bothersome for her.  She states previously she was taking Lasix 40 mg in the morning and 20 mg in the evening.  She has not seen her cardiologist in over 1 year.    She complains of continuing left shoulder arthritis.  She has a limited supply of hydrocodone she takes on occasion.  She cannot take NSAIDs.  She has tolerated the medication.      The following portions of the patient's history were reviewed and updated as appropriate: allergies, current medications, past family history, past medical history, past social history, past surgical history and problem list.          Review of Systems   Constitutional: Negative.    Respiratory: Positive for shortness of breath.    Cardiovascular: Positive for leg swelling.   Musculoskeletal: Negative.        Objective   Blood pressure 145/71, pulse 71, temperature 98 °F (36.7 °C), temperature source Temporal, height 162.6 cm (64.02\"), weight 103 kg (227 lb), SpO2 99 %.  Physical " Exam  Constitutional:       Appearance: Normal appearance.   HENT:      Head: Atraumatic.   Neck:      Musculoskeletal: Normal range of motion and neck supple.   Cardiovascular:      Rate and Rhythm: Normal rate and regular rhythm.      Pulses: Normal pulses.      Comments: Regular rate and rhythm today.  Musculoskeletal:      Comments: One half to +1 pitting edema bilateral lower extremities.         Assessment/Plan   Diagnoses and all orders for this visit:    1. Pulmonary hypertension (CMS/HCC) (Primary)  -     Ambulatory Referral to Cardiology    2. Atrial fibrillation, persistent (CMS/HCC)    3. Shoulder arthritis    Other orders  -     furosemide (Lasix) 40 MG tablet; Take 1 tablet by mouth Daily.  Dispense: 90 tablet; Refill: 1      Pulmonary hypertension, persistent atrial fibrillation, pacemaker, lower extremity edema.  Possible borderline congestive heart failure.  Definite dependent edema made worse off Lasix.  The Lasix was stopped 2 months ago because of orthostatic symptoms.  The ankle started swelling 2 weeks ago.  I am recommending she get to her cardiologist for further evaluation.  I restarted Lasix at the previous higher dose of 40 mg daily.  She states she was also taking 20 mg in the evening.  We will hold off on that for now.  If not improving they are going to let me know.  I have placed referral for her to get back to her cardiologist.    Atrial fibrillation.  Persistent.  On warfarin.  INR was 2.9.  Managed by cardiology.    Left shoulder arthritis.  Osteoarthritis.  Chronic pain syndrome related to shoulder arthritis.  She can continue the occasional hydrocodone.  She is tolerating.

## 2020-10-28 ENCOUNTER — ANTICOAGULATION VISIT (OUTPATIENT)
Dept: PHARMACY | Facility: HOSPITAL | Age: 85
End: 2020-10-28

## 2020-10-28 DIAGNOSIS — I48.19 ATRIAL FIBRILLATION, PERSISTENT (HCC): ICD-10-CM

## 2020-10-28 LAB — INR PPP: 2.58

## 2020-10-28 NOTE — PROGRESS NOTES
Anticoagulation Clinic Progress Note    Anticoagulation Summary  As of 10/28/2020    INR goal:  2.0-3.0   TTR:  75.5 % (2.1 y)   INR used for dosin.58 (10/28/2020)   Warfarin maintenance plan:  2.5 mg every Tue, Sat; 5 mg all other days   Weekly warfarin total:  30 mg   Plan last modified:  Rusty Interiano [facility] (10/15/2020)   Next INR check:  2020   Priority:  Maintenance   Target end date:  Indefinite    Indications    Atrial fibrillation  persistent (CMS/HCC) [I48.19]             Anticoagulation Episode Summary     INR check location:      Preferred lab:      Send INR reminders to:   [facility] POOL    Comments:        Anticoagulation Care Providers     Provider Role Specialty Phone number    Mary Grace Culp MD Referring Cardiology [phone]          Clinic Interview:  PCP did increase diuretic for leg swelling--improving      INR History:  Anticoagulation Monitoring 10/2/2020 10/15/2020 10/28/2020   INR 2.87 3.32 2.58   INR Date 10/1/2020 10/14/2020 10/28/2020   INR Goal 2.0-3.0 2.0-3.0 2.0-3.0   Trend Same Down Same   Last Week Total 30 mg 32.5 mg 30 mg   Next Week Total 32.5 mg 27.5 mg 30 mg   Sun 5 mg 5 mg 5 mg   Mon 5 mg 5 mg 5 mg   Tue 5 mg 2.5 mg 2.5 mg   Wed 5 mg 5 mg 5 mg   Thu 5 mg 2.5 mg (10/15); Otherwise 5 mg 5 mg   Fri 5 mg 5 mg 5 mg   Sat 2.5 mg 2.5 mg 2.5 mg   Visit Report - - -   Some recent data might be hidden       Plan:  1. INR is Therapeutic today- see above in Anticoagulation Summary.   Will instruct Brittany Villeda to Continue their warfarin regimen- see above in Anticoagulation Summary.  2. Follow up in 2 weeks  3. Spoke with pt today. They have been instructed to call if any changes in medications, doses, concerns, etc. Patient expresses understanding and has no further questions at this time.    Yasmeen Pichardo [facility]

## 2020-10-29 RX ORDER — WARFARIN SODIUM 5 MG/1
TABLET ORAL
Qty: 85 TABLET | Refills: 0 | Status: SHIPPED | OUTPATIENT
Start: 2020-10-29 | End: 2020-11-20

## 2020-11-02 ENCOUNTER — OFFICE VISIT (OUTPATIENT)
Dept: ORTHOPEDIC SURGERY | Facility: CLINIC | Age: 85
End: 2020-11-02

## 2020-11-02 VITALS — TEMPERATURE: 96 F

## 2020-11-02 DIAGNOSIS — M25.512 CHRONIC LEFT SHOULDER PAIN: Primary | ICD-10-CM

## 2020-11-02 DIAGNOSIS — M19.019 ARTHRITIS OF SHOULDER: ICD-10-CM

## 2020-11-02 DIAGNOSIS — G89.29 CHRONIC LEFT SHOULDER PAIN: Primary | ICD-10-CM

## 2020-11-02 PROCEDURE — 99213 OFFICE O/P EST LOW 20 MIN: CPT | Performed by: ORTHOPAEDIC SURGERY

## 2020-11-02 NOTE — PROGRESS NOTES
Patient: Brittany Villeda    YOB: 1935    Medical Record Number: [id number]    Chief Complaints: Worsening left shoulder pain    History of Present Illness:     85 y.o. female patient who comes in for follow-up of her left shoulder.  She had injections done a little over a month ago.  It helped on the right but not on the left.  Her left shoulder pain is getting worse.  The pain is severe, constant, and both aching and throbbing.  She has very limited movement and function.  The pain is keeping her up at night.  She comes in today with her daughter wanting to discuss the option of a possible arthroplasty.    Allergies:   Allergies   Allergen Reactions   • Codeine Hallucinations   • Amitriptyline Rash   • Amoxicillin-Pot Clavulanate Rash   • Aspirin Unknown - Low Severity     Patient doesn't know why   • Bactrim [Sulfamethoxazole-Trimethoprim] Rash   • Carisoprodol-Aspirin-Codeine Palpitations   • Iodinated Diagnostic Agents Rash   • Latex Rash   • Naproxen Rash   • Nsaids Unknown - Low Severity     unkknown   • Soma Compound With Codeine [Carisoprodol-Aspirin-Codeine] Rash   • Sulfa Antibiotics Rash   • Tramadol Palpitations     heart races        Home Medications:    Current Outpatient Medications:   •  Acetaminophen (PAIN RELIEVER PO), Take 1 tablet by mouth As Needed., Disp: , Rfl:   •  albuterol sulfate  (90 Base) MCG/ACT inhaler, Inhale 2 puffs Every 6 (Six) Hours As Needed for Wheezing., Disp: 1 inhaler, Rfl: 0  •  calcium carbonate-cholecalciferol 500-400 MG-UNIT tablet tablet, Take 1 tablet by mouth 2 (two) times a day., Disp: , Rfl:   •  furosemide (Lasix) 40 MG tablet, Take 1 tablet by mouth Daily., Disp: 90 tablet, Rfl: 1  •  gabapentin (NEURONTIN) 300 MG capsule, Take 1 capsule by mouth 2 (two) times a day., Disp: 180 capsule, Rfl: 1  •  HYDROcodone-acetaminophen (NORCO) 5-325 MG per tablet, 1/2 - 1 tab po qd AS NEEDED for severe shoulder arthritis, Disp: 20 tablet, Rfl: 0  •   mesalamine (APRISO) 0.375 g 24 hr capsule, TAKE 4 CAPSULES DAILY, Disp: 360 capsule, Rfl: 1  •  omeprazole (priLOSEC) 20 MG capsule, TAKE 1 CAPSULE EVERY DAY, Disp: 90 capsule, Rfl: 0  •  vitamin B-12 (CYANOCOBALAMIN) 1000 MCG tablet, Take 1,000 mcg by mouth Daily., Disp: , Rfl:   •  warfarin (COUMADIN) 5 MG tablet, TAKE 1/2 TABLET ON SATURDAYS AND TAKE 1 TABLET ALL OTHER DAYS OR AS DIRECTED, Disp: 85 tablet, Rfl: 0  •  Wheat Dextrin (BENEFIBER DRINK MIX PO), Take  by mouth 2 (two) times a day., Disp: , Rfl:     Past Medical History:   Diagnosis Date   • Anemia    • Arrhythmia    • Arthritis    • Asthma    • Bradycardia    • Chest pain    • Colitis    • COPD (chronic obstructive pulmonary disease) (CMS/HCC)    • Diastolic dysfunction    • Essential hypertension 5/12/2016   • GERD (gastroesophageal reflux disease)    • Heart block    • Hypertension    • Hypertensive heart disease    • Hyperthyroidism    • Kidney stone    • Leukopenia    • Low back pain    • MGUS (monoclonal gammopathy of unknown significance)    • Nephrolithiasis    • Obesity    • Paroxysmal atrial fibrillation (CMS/HCC)    • Peptic ulceration    • PSVT (paroxysmal supraventricular tachycardia) (CMS/HCC)    • Pulmonary hypertension (CMS/HCC)    • Rectal bleed    • Sleep apnea    • Trifascicular block    • 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)    • Ventricular tachycardia (CMS/HCC)     nonsustained   • Vertigo        Past Surgical History:   Procedure Laterality Date   • BACK SURGERY      lumbar fusion   • CARDIAC ELECTROPHYSIOLOGY PROCEDURE N/A 11/7/2018    Procedure: Pacemaker DC new   [place];  Surgeon: Jesus Granda MD;  Location: [facility] INVASIVE LOCATION;  Service: Cardiology   • CHOLECYSTECTOMY     • COLONOSCOPY  06/16/2014    colitis, cryptitis,  tics, NBIH, TA w/low grade dysplasia   • COLONOSCOPY N/A 10/28/2019    Procedure: COLONOSCOPY WITH COLD AND HOT POLYPECTOMIES;  Surgeon: Micaela English MD;   Location: [facility] ENDOSCOPY;  Service: Gastroenterology   • HEMORRHOIDECTOMY     • HYSTERECTOMY     • KNEE ARTHROPLASTY     • MYOMECTOMY     • SHOULDER SURGERY     • SINUS SURGERY     • TOE NAIL AMPUTATION  2019   • TONSILLECTOMY         Social History     Occupational History   • Occupation: Caregiver     Employer: RETIRED     Comment: worked for [employer]   Tobacco Use   • Smoking status: Former Smoker     Packs/day: 1.50     Years: 10.00     Pack years: 15.00     Start date:      Quit date:      Years since quittin.8   • Smokeless tobacco: Never Used   • Tobacco comment: QUIT SMOKING    Substance and Sexual Activity   • Alcohol use: No     Comment: Daily caffeine use - one cup of coffee   • Drug use: Defer   • Sexual activity: Defer      Social History     Social History Narrative   • Not on file       Family History   Problem Relation Age of Onset   • Diabetes Mother    • Breast cancer Sister    • Kidney cancer Sister    • Heart disease Sister    • Prostate cancer Brother    • Prostate cancer Brother    • Prostate cancer Brother    • Malig Hyperthermia Neg Hx        Review of Systems:      Constitutional: Denies fever, shaking or chills   Eyes: Denies change in visual acuity   HEENT: Denies nasal congestion or sore throat   Respiratory: Denies cough or shortness of breath   Cardiovascular: Denies chest pain or edema  Endocrine: Denies tremors, palpitations, intolerance of heat or cold, polyuria, polydipsia.  GI: Denies abdominal pain, nausea, vomiting, bloody stools or diarrhea  : Denies frequency, urgency, incontinence, retention, or nocturia.  Musculoskeletal: Denies numbness, tingling or loss of motor function except as above  Integument: Denies rash, lesion or ulceration   Neurologic: Denies headache or focal weakness, deficits  Heme: Denies spontaneous or excessive bleeding, epistaxis, hematuria, melena, fatigue, enlarged or tender lymph nodes.      All other pertinent  positives and negatives as noted above in HPI.    Physical Exam:   85 y.o. female  Vitals:    11/02/20 0756   Temp: 96 °F (35.6 °C)   TempSrc: Temporal     General:  Patient is awake and alert.  Appears in no acute distress or discomfort.    Psych:  Affect and demeanor are appropriate.    Eyes:  Conjunctiva and sclera appear grossly normal.  Eyes track well and EOM seem to be intact.    Ears:  No gross abnormalities.  Hearing adequate for the exam.    Cardiovascular:  Regular rate and rhythm.    Lungs:  Good chest expansion.  Breathing unlabored.    Extremities:  Left shoulder is examined.  Skin is benign.  Moderate anterior tenderness.  Active and passive motion are limited and painful for her.  She has crepitus with attempted range of motion.  Cannot really get a good assessment of her cuff strength due to pain. Intact deltoid function and axillary nerve sensation.  Normal motor and sensory function in her lower arm and hand.  Palpable radial pulse.  Brisk capillary refill.    Imaging: Previous x-rays of the left shoulder are reviewed and show end-stage left shoulder osteoarthritis.  Incidental note of ipsilateral pacemaker    Assessment/Plan: Left shoulder end-stage osteoarthritis    We discussed the natural history of this condition and all available treatment options, both surgical and nonsurgical.  She is potentially a candidate for an arthroplasty but she would need preoperative medical clearance and optimization.  She has an appointment to see Dr. Culp on Thursday.  I suggest that she talk with her about whether or not she is at acceptable risk for potential surgery or if she has any modifiable risk factors.  She would also need permission to come off the Coumadin 7 days prior to surgery.  If she decides that she would like to move forward with the surgery and is deemed to be at acceptable risk then she can contact me and we can look at potentially getting her scheduled.  She would need to see me back again  for a preoperative visit.    Wes Roldan MD  11/02/2020

## 2020-11-05 ENCOUNTER — OFFICE VISIT (OUTPATIENT)
Dept: CARDIOLOGY | Facility: CLINIC | Age: 85
End: 2020-11-05

## 2020-11-05 VITALS
BODY MASS INDEX: 38.58 KG/M2 | WEIGHT: 226 LBS | HEIGHT: 64 IN | DIASTOLIC BLOOD PRESSURE: 70 MMHG | SYSTOLIC BLOOD PRESSURE: 108 MMHG | HEART RATE: 71 BPM

## 2020-11-05 DIAGNOSIS — G47.33 OSA (OBSTRUCTIVE SLEEP APNEA): ICD-10-CM

## 2020-11-05 DIAGNOSIS — I51.89 DIASTOLIC DYSFUNCTION: ICD-10-CM

## 2020-11-05 DIAGNOSIS — I27.20 PULMONARY HYPERTENSION (HCC): ICD-10-CM

## 2020-11-05 DIAGNOSIS — I10 ESSENTIAL HYPERTENSION: ICD-10-CM

## 2020-11-05 DIAGNOSIS — I47.1 PAROXYSMAL SVT (SUPRAVENTRICULAR TACHYCARDIA) (HCC): ICD-10-CM

## 2020-11-05 DIAGNOSIS — I71.21 ASCENDING AORTIC ANEURYSM (HCC): Primary | ICD-10-CM

## 2020-11-05 DIAGNOSIS — Z95.0 PACEMAKER: ICD-10-CM

## 2020-11-05 PROCEDURE — 93000 ELECTROCARDIOGRAM COMPLETE: CPT | Performed by: INTERNAL MEDICINE

## 2020-11-05 PROCEDURE — 99214 OFFICE O/P EST MOD 30 MIN: CPT | Performed by: INTERNAL MEDICINE

## 2020-11-05 NOTE — PROGRESS NOTES
Date of Office Visit: 2020  Encounter Provider: Mary Grace Culp MD  Place of Service: [facility] CARDIOLOGY  Patient Name: Brittany Villeda  :1935    Chief complaint  Paroxysmal atrial fibrillation, hypertension, borderline dilated aortic root, sick sinus syndrome status post pacemaker placement    History of Present Illness  The patient is a pleasant, 85-year-old female with a history of hypertension, obstructive sleep apnea, obesity, immobility, pulmonary hypertension, history of nonsustained ventricular tachycardia, and obstructive sleep apnea.  She has a history of diastolic dysfunction.  In , she had nonsustained ventricular tachycardia.  A stress perfusion study was negative for ischemia.  An echocardiogram revealed normal left ventricular size and function with moderate left ventricular hypertrophy. In 2013, she was seen for persistent dizziness in the setting of new-onset paroxysmal atrial fibrillation.  She was admitted to the hospital and not felt to have had a stroke.  Her symptoms actually resolved with ear manipulation and were felt to be more vestibular in nature.  She also developed paroxysmal atrial fibrillation with early bradycardia which resolved with treatment of her sleep apnea and AV flaco blocker therapy.  She was placed on warfarin.  She also had a heme-positive stool with anemia and was seen by the gastrointestinal doctors and EGD and colonoscopy were performed. The EGD revealed mild erythema but otherwise stable.  She initiated therapy with a BiPAP.  In 2017 presented to the emergency room with chest pain.  She ruled out for myocardial infarction.  A stress perfusion study that was negative for ischemia with normal systolic function.  A Ziopatch revealed sinus rhythm with episodes of supra-ventricular tachycardia that was symptomatic.  In 2018 EKG showed pauses in the setting of bifascicular block.  A 24-hour Holter  revealed 65 pauses 3 seconds longest induration.  Sinus rhythm was present throughout most of the study.  With complaints of palpitations PACs were noted.  Current 22.2% of the monitor time there is also 14 episodes of atrial tachycardia lasting 4 beats.  There were also episodes of 2-1 AV block and questionable third degree.  An echocardiogram revealed normal systolic function grade 1 diastolic dysfunction, borderline right ventricular enlargement, mild to moderate tricuspid valve regurgitation with an RV systolic pressure 48 mmHg.  The ascending aorta was mildly dilated at 3.8 cm.  I recommended a pacemaker placement but she deferred in which she discuss this further with her daughter and is here today regarding this.  By September 2018 she was noted to have more symptomatic bradycardia and after much discussion she underwent pacemaker placement in November 2018.  In 2019 at follow-up echocardiogram showed an ejection fraction of 56%.  In August 2020 a sending aorta by CT imaging was stable measuring 3.9 cm.  Mediastinal lymphadenopathy was present.  Large right renal cyst was again noted.    Since last visit she has had no chest pain shortness of breath palpitations syncope near syncope.  Lasix was discontinued several months ago due to concerns of orthostasis.  Several weeks ago with worsening edema and shortness of breath Lasix was resumed.  She has been suffering from shoulder pain and at times in the morning her pressures in the 140s.  At other times it is as it is today.  She denies any dizziness or syncope.  She is wanting to consider left shoulder surgery as the pain is quite intense at times.  She is discussed this with Dr. Roldan.    Past Medical History:   Diagnosis Date   • Anemia    • Arrhythmia    • Arthritis    • Asthma    • Bradycardia    • Chest pain    • Colitis    • COPD (chronic obstructive pulmonary disease) (CMS/formerly [facility])    • Diastolic dysfunction    • Essential hypertension 5/12/2016   • GERD  (gastroesophageal reflux disease)    • Heart block    • Hypertension    • Hypertensive heart disease    • Hyperthyroidism    • Kidney stone    • Leukopenia    • Low back pain    • MGUS (monoclonal gammopathy of unknown significance)    • Nephrolithiasis    • Obesity    • Paroxysmal atrial fibrillation (CMS/HCC)    • Peptic ulceration    • PSVT (paroxysmal supraventricular tachycardia) (CMS/HCC)    • Pulmonary hypertension (CMS/HCC)    • Rectal bleed    • Sleep apnea    • Trifascicular block    • 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)    • Ventricular tachycardia (CMS/HCC)     nonsustained   • Vertigo      Past Surgical History:   Procedure Laterality Date   • BACK SURGERY      lumbar fusion   • CARDIAC ELECTROPHYSIOLOGY PROCEDURE N/A 11/7/2018    Procedure: Pacemaker DC new   [place];  Surgeon: Jessu Granda MD;  [facility] [facility];  Service: Cardiology   • CHOLECYSTECTOMY     • COLONOSCOPY  06/16/2014    colitis, cryptitis,  tics, NBIH, TA w/low grade dysplasia   • COLONOSCOPY N/A 10/28/2019    Procedure: COLONOSCOPY WITH COLD AND HOT POLYPECTOMIES;  Surgeon: Micaela English MD;  Location: [facility];  Service: Gastroenterology   • HEMORRHOIDECTOMY     • HYSTERECTOMY     • KNEE ARTHROPLASTY     • MYOMECTOMY     • SHOULDER SURGERY     • SINUS SURGERY     • TOE NAIL AMPUTATION  03/04/2019   • TONSILLECTOMY       Outpatient Medications Prior to Visit   Medication Sig Dispense Refill   • Acetaminophen (PAIN RELIEVER PO) Take 1 tablet by mouth As Needed.     • albuterol sulfate  (90 Base) MCG/ACT inhaler Inhale 2 puffs Every 6 (Six) Hours As Needed for Wheezing. 1 inhaler 0   • calcium carbonate-cholecalciferol 500-400 MG-UNIT tablet tablet Take 1 tablet by mouth 2 (two) times a day.     • furosemide (Lasix) 40 MG tablet Take 1 tablet by mouth Daily. 90 tablet 1   • gabapentin (NEURONTIN) 300 MG capsule Take 1 capsule by mouth 2 (two) times a day. 180  capsule 1   • HYDROcodone-acetaminophen (NORCO) 5-325 MG per tablet 1/2 - 1 tab po qd AS NEEDED for severe shoulder arthritis 20 tablet 0   • mesalamine (APRISO) 0.375 g 24 hr capsule TAKE 4 CAPSULES DAILY 360 capsule 1   • omeprazole (priLOSEC) 20 MG capsule TAKE 1 CAPSULE EVERY DAY 90 capsule 0   • vitamin B-12 (CYANOCOBALAMIN) 1000 MCG tablet Take 1,000 mcg by mouth Daily.     • warfarin (COUMADIN) 5 MG tablet TAKE 1/2 TABLET ON  AND TAKE 1 TABLET ALL OTHER DAYS OR AS DIRECTED 85 tablet 0   • Wheat Dextrin (BENEFIBER DRINK MIX PO) Take  by mouth 2 (two) times a day.       No facility-administered medications prior to visit.        Allergies as of 2020 - Reviewed 2020   Allergen Reaction Noted   • Codeine Hallucinations 2017   • Amitriptyline Rash 2016   • Amoxicillin-pot clavulanate Rash 2016   • Aspirin Unknown - Low Severity 2016   • Bactrim [sulfamethoxazole-trimethoprim] Rash 2016   • Carisoprodol-aspirin-codeine Palpitations 2016   • Iodinated diagnostic agents Rash 2016   • Latex Rash 2016   • Naproxen Rash 2016   • Nsaids Unknown - Low Severity 2016   • Soma compound with codeine [carisoprodol-aspirin-codeine] Rash 2016   • Sulfa antibiotics Rash 2016   • Tramadol Palpitations 2019     Social History     Socioeconomic History   • Marital status:      Spouse name: Not on file   • Number of children: 10   • Years of education: High School   • Highest education level: Not on file   Occupational History   • Occupation: Caregiver     Employer: RETIRED     Comment: worked for [employer]   Tobacco Use   • Smoking status: Former Smoker     Packs/day: 1.50     Years: 10.00     Pack years: 15.00     Start date:      Quit date:      Years since quittin.8   • Smokeless tobacco: Never Used   • Tobacco comment: QUIT SMOKING    Substance and Sexual Activity   • Alcohol use: No      "Comment: Daily caffeine use - one cup of coffee   • Drug use: Defer   • Sexual activity: Defer     Family History   Problem Relation Age of Onset   • Diabetes Mother    • Breast cancer Sister    • Kidney cancer Sister    • Heart disease Sister    • Prostate cancer Brother    • Prostate cancer Brother    • Prostate cancer Brother    • Malig Hyperthermia Neg Hx      Review of Systems   Constitution: Negative for fever, malaise/fatigue, weight gain and weight loss.   HENT: Negative for ear pain, hearing loss, nosebleeds and sore throat.    Eyes: Negative for double vision, pain, vision loss in left eye and vision loss in right eye.   Cardiovascular: Positive for leg swelling.        See history of present illness.   Respiratory: Negative for cough, shortness of breath, sleep disturbances due to breathing, snoring and wheezing.    Endocrine: Negative for cold intolerance, heat intolerance and polyuria.   Skin: Negative for itching, poor wound healing and rash.   Musculoskeletal: Negative for joint pain, joint swelling and myalgias.   Gastrointestinal: Negative for abdominal pain, diarrhea, hematochezia, nausea and vomiting.   Genitourinary: Negative for hematuria and hesitancy.   Neurological: Negative for numbness, paresthesias and seizures.   Psychiatric/Behavioral: Negative for depression. The patient is not nervous/anxious.         Objective:     Vitals:    11/05/20 1047   BP: 108/70   Pulse: 71   Weight: 103 kg (226 lb)   Height: 162.6 cm (64\")     Body mass index is 38.79 kg/m².    Vitals signs reviewed.   Constitutional:       Appearance: Well-developed.      Comments: Morbid obesty   Eyes:      General: No scleral icterus.        Right eye: No discharge.      Conjunctiva/sclera: Conjunctivae normal.      Pupils: Pupils are equal, round, and reactive to light.   HENT:      Head: Normocephalic.      Nose: Nose normal.   Neck:      Musculoskeletal: Normal range of motion and neck supple.      Thyroid: No " thyromegaly.      Vascular: No JVD.   Pulmonary:      Effort: Pulmonary effort is normal. No respiratory distress.      Breath sounds: Normal breath sounds. No wheezing. No rales.   Cardiovascular:      Normal rate. Regular rhythm. Normal S1. Normal S2.      Murmurs: There is no murmur.      No gallop.   Pulses:     Intact distal pulses.   Edema:     Peripheral edema present.     Pretibial: bilateral 1+ edema of the pretibial area.     Ankle: bilateral 1+ edema of the ankle.  Abdominal:      General: Bowel sounds are normal. There is no distension.      Palpations: Abdomen is soft.      Tenderness: There is no abdominal tenderness. There is no rebound.   Musculoskeletal: Normal range of motion.         General: No tenderness.   Skin:     General: Skin is warm and dry.      Findings: No erythema or rash.   Neurological:      Mental Status: Alert and oriented to person, place, and time.   Psychiatric:         Behavior: Behavior normal.         Thought Content: Thought content normal.         Judgment: Judgment normal.       Lab Review:     ECG 12 Lead    Date/Time: 11/5/2020 10:53 AM  Performed by: Mary Grace Culp MD  Authorized by: Mary Grace Culp MD   Comparison: compared with previous ECG   Rhythm: atrial fibrillation  Pacing: ventricular paced rhythm  Clinical impression: abnormal EKG          Assessment:       Diagnosis Plan   1. Ascending aortic aneurysm (CMS/HCC)  ECG 12 Lead   2. Diastolic dysfunction  ECG 12 Lead   3. Pulmonary hypertension (CMS/HCC)  Adult Transthoracic Echo Complete W/ Cont if Necessary Per Protocol   4. Pacemaker     5. Paroxysmal SVT (supraventricular tachycardia) (CMS/HCC)     6. Essential hypertension     7. HUGO (obstructive sleep apnea)       Plan:       1.  Persistent atrial fibrillation with history of paroxysmal supraventricular tachycardia.  Rates are controlled and she is tolerating Coumadin well without falls or bleeding  2.  Status post permanent pacemaker placement.  Device check  next week  3.  Edema.  Improved with resumption of diuretics.  We will check an echocardiogram to reassess pulmonary pressures.  4.  Preoperative clearance for left shoulder surgery.  She had a stress test last year that was normal and no anginal symptoms.  Her edema needs further evaluation as above.  As long as there is no significant LV systolic dysfunction or pulmonary hypertension, her perioperative risk is increased but not prohibitive.  Would need Lovenox bridge when she is off Coumadin and would like to start back Coumadin fairly soon after shoulder surgery.  5.  History of diastolic dysfunction.   6.  Dilated asending aorta able by CT in August 2020  7.  Pulmonary hypertension.  As above  8.  Untreated HUGO, untreated  9.  Rectal bleeding with hemorroids.  No recent events  10.  Mediastinal adenopathy.  Patient and daughter state they have discussed this with Dr. Esparza who will continue to follow this         Your medication list          Accurate as of November 5, 2020 11:59 PM. If you have any questions, ask your nurse or doctor.            CONTINUE taking these medications      Instructions Last Dose Given Next Dose Due   albuterol sulfate  (90 Base) MCG/ACT inhaler  Commonly known as: PROVENTIL HFA;VENTOLIN HFA;PROAIR HFA      Inhale 2 puffs Every 6 (Six) Hours As Needed for Wheezing.       BENEFIBER DRINK MIX PO      Take  by mouth 2 (two) times a day.       calcium carbonate-cholecalciferol 500-400 MG-UNIT tablet tablet      Take 1 tablet by mouth 2 (two) times a day.       furosemide 40 MG tablet  Commonly known as: Lasix      Take 1 tablet by mouth Daily.       gabapentin 300 MG capsule  Commonly known as: NEURONTIN      Take 1 capsule by mouth 2 (two) times a day.       HYDROcodone-acetaminophen 5-325 MG per tablet  Commonly known as: NORCO      1/2 - 1 tab po qd AS NEEDED for severe shoulder arthritis       mesalamine 0.375 g 24 hr capsule  Commonly known as: APRISO      TAKE 4 CAPSULES  DAILY       omeprazole 20 MG capsule  Commonly known as: priLOSEC      TAKE 1 CAPSULE EVERY DAY       PAIN RELIEVER PO      Take 1 tablet by mouth As Needed.       vitamin B-12 1000 MCG tablet  Commonly known as: CYANOCOBALAMIN      Take 1,000 mcg by mouth Daily.       warfarin 5 MG tablet  Commonly known as: COUMADIN      TAKE 1/2 TABLET ON SATURDAYS AND TAKE 1 TABLET ALL OTHER DAYS OR AS DIRECTED              Patient is no longer taking -.  I corrected the med list to reflect this.  I did not stop these medications.    Dictated utilizing Dragon dictation

## 2020-11-11 LAB — INR PPP: 2.31

## 2020-11-12 ENCOUNTER — ANTICOAGULATION VISIT (OUTPATIENT)
Dept: PHARMACY | Facility: HOSPITAL | Age: 85
End: 2020-11-12

## 2020-11-12 DIAGNOSIS — I48.19 ATRIAL FIBRILLATION, PERSISTENT (HCC): ICD-10-CM

## 2020-11-20 ENCOUNTER — HOSPITAL ENCOUNTER (OUTPATIENT)
Facility: HOSPITAL | Age: 85
Setting detail: OBSERVATION
Discharge: HOME HEALTH | End: 2020-11-24
Attending: EMERGENCY MEDICINE | Admitting: HOSPITALIST

## 2020-11-20 ENCOUNTER — TELEPHONE (OUTPATIENT)
Dept: FAMILY MEDICINE CLINIC | Facility: CLINIC | Age: 85
End: 2020-11-20

## 2020-11-20 ENCOUNTER — APPOINTMENT (OUTPATIENT)
Dept: GENERAL RADIOLOGY | Facility: HOSPITAL | Age: 85
End: 2020-11-20

## 2020-11-20 DIAGNOSIS — M25.561 ACUTE PAIN OF BOTH KNEES: ICD-10-CM

## 2020-11-20 DIAGNOSIS — M25.562 ACUTE PAIN OF LEFT KNEE: ICD-10-CM

## 2020-11-20 DIAGNOSIS — R29.898 WEAKNESS OF BOTH LOWER EXTREMITIES: ICD-10-CM

## 2020-11-20 DIAGNOSIS — M25.062 HEMARTHROSIS OF LEFT KNEE: ICD-10-CM

## 2020-11-20 DIAGNOSIS — Z79.01 CHRONIC ANTICOAGULATION: ICD-10-CM

## 2020-11-20 DIAGNOSIS — Z74.09 IMMOBILITY: Primary | ICD-10-CM

## 2020-11-20 DIAGNOSIS — M25.562 ACUTE PAIN OF BOTH KNEES: ICD-10-CM

## 2020-11-20 PROBLEM — M25.462 EFFUSION OF BURSA OF LEFT KNEE: Status: ACTIVE | Noted: 2020-11-20

## 2020-11-20 LAB
ALBUMIN SERPL-MCNC: 3.4 G/DL (ref 3.5–5.2)
ALBUMIN/GLOB SERPL: 1 G/DL
ALP SERPL-CCNC: 77 U/L (ref 39–117)
ALT SERPL W P-5'-P-CCNC: 14 U/L (ref 1–33)
ANION GAP SERPL CALCULATED.3IONS-SCNC: 7.6 MMOL/L (ref 5–15)
AST SERPL-CCNC: 32 U/L (ref 1–32)
BASOPHILS # BLD AUTO: 0.03 10*3/MM3 (ref 0–0.2)
BASOPHILS NFR BLD AUTO: 0.7 % (ref 0–1.5)
BILIRUB SERPL-MCNC: 4 MG/DL (ref 0–1.2)
BILIRUB UR QL STRIP: NEGATIVE
BUN SERPL-MCNC: 13 MG/DL (ref 8–23)
BUN/CREAT SERPL: 17.3 (ref 7–25)
CALCIUM SPEC-SCNC: 10.6 MG/DL (ref 8.6–10.5)
CHLORIDE SERPL-SCNC: 108 MMOL/L (ref 98–107)
CLARITY UR: CLEAR
CO2 SERPL-SCNC: 24.4 MMOL/L (ref 22–29)
COLOR UR: ABNORMAL
CREAT SERPL-MCNC: 0.75 MG/DL (ref 0.57–1)
DEPRECATED RDW RBC AUTO: 45.7 FL (ref 37–54)
EOSINOPHIL # BLD AUTO: 0.02 10*3/MM3 (ref 0–0.4)
EOSINOPHIL NFR BLD AUTO: 0.4 % (ref 0.3–6.2)
ERYTHROCYTE [DISTWIDTH] IN BLOOD BY AUTOMATED COUNT: 13.3 % (ref 12.3–15.4)
GFR SERPL CREATININE-BSD FRML MDRD: 89 ML/MIN/1.73
GLOBULIN UR ELPH-MCNC: 3.4 GM/DL
GLUCOSE SERPL-MCNC: 111 MG/DL (ref 65–99)
GLUCOSE UR STRIP-MCNC: NEGATIVE MG/DL
HCT VFR BLD AUTO: 34.8 % (ref 34–46.6)
HGB BLD-MCNC: 11.4 G/DL (ref 12–15.9)
HGB UR QL STRIP.AUTO: NEGATIVE
IMM GRANULOCYTES # BLD AUTO: 0.01 10*3/MM3 (ref 0–0.05)
IMM GRANULOCYTES NFR BLD AUTO: 0.2 % (ref 0–0.5)
INR PPP: 2.26 (ref 0.9–1.1)
INR PPP: 2.38 (ref 0.9–1.1)
KETONES UR QL STRIP: NEGATIVE
LEUKOCYTE ESTERASE UR QL STRIP.AUTO: NEGATIVE
LYMPHOCYTES # BLD AUTO: 1.43 10*3/MM3 (ref 0.7–3.1)
LYMPHOCYTES NFR BLD AUTO: 31 % (ref 19.6–45.3)
MAGNESIUM SERPL-MCNC: 1.8 MG/DL (ref 1.6–2.4)
MCH RBC QN AUTO: 30.8 PG (ref 26.6–33)
MCHC RBC AUTO-ENTMCNC: 32.8 G/DL (ref 31.5–35.7)
MCV RBC AUTO: 94.1 FL (ref 79–97)
MONOCYTES # BLD AUTO: 0.61 10*3/MM3 (ref 0.1–0.9)
MONOCYTES NFR BLD AUTO: 13.2 % (ref 5–12)
NEUTROPHILS NFR BLD AUTO: 2.51 10*3/MM3 (ref 1.7–7)
NEUTROPHILS NFR BLD AUTO: 54.5 % (ref 42.7–76)
NITRITE UR QL STRIP: NEGATIVE
NRBC BLD AUTO-RTO: 0 /100 WBC (ref 0–0.2)
PH UR STRIP.AUTO: 6.5 [PH] (ref 5–8)
PLATELET # BLD AUTO: 149 10*3/MM3 (ref 140–450)
PMV BLD AUTO: 9.5 FL (ref 6–12)
POTASSIUM SERPL-SCNC: 3.8 MMOL/L (ref 3.5–5.2)
PROT SERPL-MCNC: 6.8 G/DL (ref 6–8.5)
PROT UR QL STRIP: ABNORMAL
PROTHROMBIN TIME: 24.7 SECONDS (ref 11.7–14.2)
PROTHROMBIN TIME: 25.7 SECONDS (ref 11.7–14.2)
RBC # BLD AUTO: 3.7 10*6/MM3 (ref 3.77–5.28)
SODIUM SERPL-SCNC: 140 MMOL/L (ref 136–145)
SP GR UR STRIP: 1.02 (ref 1–1.03)
T4 FREE SERPL-MCNC: 1.47 NG/DL (ref 0.93–1.7)
TSH SERPL DL<=0.05 MIU/L-ACNC: 0.12 UIU/ML (ref 0.27–4.2)
UROBILINOGEN UR QL STRIP: ABNORMAL
WBC # BLD AUTO: 4.61 10*3/MM3 (ref 3.4–10.8)

## 2020-11-20 PROCEDURE — 99285 EMERGENCY DEPT VISIT HI MDM: CPT

## 2020-11-20 PROCEDURE — 81003 URINALYSIS AUTO W/O SCOPE: CPT | Performed by: PHYSICIAN ASSISTANT

## 2020-11-20 PROCEDURE — G0378 HOSPITAL OBSERVATION PER HR: HCPCS

## 2020-11-20 PROCEDURE — 73560 X-RAY EXAM OF KNEE 1 OR 2: CPT

## 2020-11-20 PROCEDURE — 84443 ASSAY THYROID STIM HORMONE: CPT | Performed by: PHYSICIAN ASSISTANT

## 2020-11-20 PROCEDURE — 85025 COMPLETE CBC W/AUTO DIFF WBC: CPT | Performed by: PHYSICIAN ASSISTANT

## 2020-11-20 PROCEDURE — 96374 THER/PROPH/DIAG INJ IV PUSH: CPT

## 2020-11-20 PROCEDURE — 80053 COMPREHEN METABOLIC PANEL: CPT | Performed by: PHYSICIAN ASSISTANT

## 2020-11-20 PROCEDURE — C9803 HOPD COVID-19 SPEC COLLECT: HCPCS

## 2020-11-20 PROCEDURE — 71045 X-RAY EXAM CHEST 1 VIEW: CPT

## 2020-11-20 PROCEDURE — 83735 ASSAY OF MAGNESIUM: CPT | Performed by: PHYSICIAN ASSISTANT

## 2020-11-20 PROCEDURE — 25010000002 HYDROMORPHONE PER 4 MG: Performed by: EMERGENCY MEDICINE

## 2020-11-20 PROCEDURE — 85610 PROTHROMBIN TIME: CPT | Performed by: INTERNAL MEDICINE

## 2020-11-20 PROCEDURE — U0004 COV-19 TEST NON-CDC HGH THRU: HCPCS | Performed by: NURSE PRACTITIONER

## 2020-11-20 PROCEDURE — 84439 ASSAY OF FREE THYROXINE: CPT | Performed by: PHYSICIAN ASSISTANT

## 2020-11-20 PROCEDURE — 85610 PROTHROMBIN TIME: CPT | Performed by: PHYSICIAN ASSISTANT

## 2020-11-20 RX ORDER — ACETAMINOPHEN 325 MG/1
325 TABLET ORAL EVERY 6 HOURS PRN
Status: DISCONTINUED | OUTPATIENT
Start: 2020-11-20 | End: 2020-11-24 | Stop reason: HOSPADM

## 2020-11-20 RX ORDER — ACETAMINOPHEN 500 MG
500 TABLET ORAL 2 TIMES DAILY
Status: ON HOLD | COMMUNITY
End: 2020-11-24 | Stop reason: SDUPTHER

## 2020-11-20 RX ORDER — ONDANSETRON 2 MG/ML
4 INJECTION INTRAMUSCULAR; INTRAVENOUS EVERY 6 HOURS PRN
Status: DISCONTINUED | OUTPATIENT
Start: 2020-11-20 | End: 2020-11-24 | Stop reason: HOSPADM

## 2020-11-20 RX ORDER — SODIUM CHLORIDE 0.9 % (FLUSH) 0.9 %
10 SYRINGE (ML) INJECTION AS NEEDED
Status: DISCONTINUED | OUTPATIENT
Start: 2020-11-20 | End: 2020-11-24 | Stop reason: HOSPADM

## 2020-11-20 RX ORDER — GABAPENTIN 300 MG/1
300 CAPSULE ORAL 2 TIMES DAILY
Status: DISCONTINUED | OUTPATIENT
Start: 2020-11-20 | End: 2020-11-24 | Stop reason: HOSPADM

## 2020-11-20 RX ORDER — PANTOPRAZOLE SODIUM 40 MG/1
40 TABLET, DELAYED RELEASE ORAL EVERY MORNING
Status: DISCONTINUED | OUTPATIENT
Start: 2020-11-21 | End: 2020-11-24 | Stop reason: HOSPADM

## 2020-11-20 RX ORDER — WARFARIN SODIUM 5 MG/1
5 TABLET ORAL
Status: COMPLETED | OUTPATIENT
Start: 2020-11-20 | End: 2020-11-20

## 2020-11-20 RX ORDER — ALBUTEROL SULFATE 90 UG/1
2 AEROSOL, METERED RESPIRATORY (INHALATION) EVERY 6 HOURS PRN
Status: DISCONTINUED | OUTPATIENT
Start: 2020-11-20 | End: 2020-11-21 | Stop reason: CLARIF

## 2020-11-20 RX ORDER — HYDROMORPHONE HYDROCHLORIDE 1 MG/ML
0.5 INJECTION, SOLUTION INTRAMUSCULAR; INTRAVENOUS; SUBCUTANEOUS ONCE
Status: COMPLETED | OUTPATIENT
Start: 2020-11-20 | End: 2020-11-20

## 2020-11-20 RX ORDER — SODIUM CHLORIDE 0.9 % (FLUSH) 0.9 %
10 SYRINGE (ML) INJECTION EVERY 12 HOURS SCHEDULED
Status: DISCONTINUED | OUTPATIENT
Start: 2020-11-20 | End: 2020-11-24 | Stop reason: HOSPADM

## 2020-11-20 RX ORDER — WARFARIN SODIUM 1 MG/1
1 TABLET ORAL
Status: DISCONTINUED | OUTPATIENT
Start: 2020-11-20 | End: 2020-11-20

## 2020-11-20 RX ORDER — WARFARIN SODIUM 2.5 MG/1
2.5 TABLET ORAL
Status: DISCONTINUED | OUTPATIENT
Start: 2020-11-21 | End: 2020-11-21

## 2020-11-20 RX ORDER — HYDROMORPHONE HYDROCHLORIDE 1 MG/ML
0.5 INJECTION, SOLUTION INTRAMUSCULAR; INTRAVENOUS; SUBCUTANEOUS
Status: DISCONTINUED | OUTPATIENT
Start: 2020-11-20 | End: 2020-11-24

## 2020-11-20 RX ORDER — WARFARIN SODIUM 5 MG/1
5 TABLET ORAL
Status: DISCONTINUED | OUTPATIENT
Start: 2020-11-22 | End: 2020-11-21

## 2020-11-20 RX ORDER — CHOLECALCIFEROL (VITAMIN D3) 125 MCG
1000 CAPSULE ORAL DAILY
Status: DISCONTINUED | OUTPATIENT
Start: 2020-11-21 | End: 2020-11-24 | Stop reason: HOSPADM

## 2020-11-20 RX ORDER — HYDROCODONE BITARTRATE AND ACETAMINOPHEN 5; 325 MG/1; MG/1
1 TABLET ORAL EVERY 4 HOURS PRN
Status: DISCONTINUED | OUTPATIENT
Start: 2020-11-20 | End: 2020-11-24 | Stop reason: HOSPADM

## 2020-11-20 RX ORDER — FUROSEMIDE 40 MG/1
40 TABLET ORAL DAILY
Status: DISCONTINUED | OUTPATIENT
Start: 2020-11-21 | End: 2020-11-24 | Stop reason: HOSPADM

## 2020-11-20 RX ORDER — WARFARIN SODIUM 5 MG/1
5 TABLET ORAL
COMMUNITY
End: 2020-11-24 | Stop reason: HOSPADM

## 2020-11-20 RX ORDER — WARFARIN SODIUM 2.5 MG/1
2.5 TABLET ORAL
COMMUNITY
End: 2020-11-24 | Stop reason: HOSPADM

## 2020-11-20 RX ORDER — MESALAMINE 0.38 G/1
1.5 CAPSULE, EXTENDED RELEASE ORAL DAILY
Status: DISCONTINUED | OUTPATIENT
Start: 2020-11-21 | End: 2020-11-24 | Stop reason: HOSPADM

## 2020-11-20 RX ADMIN — HYDROCODONE BITARTRATE AND ACETAMINOPHEN 1 TABLET: 5; 325 TABLET ORAL at 23:55

## 2020-11-20 RX ADMIN — WARFARIN 5 MG: 5 TABLET ORAL at 22:43

## 2020-11-20 RX ADMIN — GABAPENTIN 300 MG: 300 CAPSULE ORAL at 22:11

## 2020-11-20 RX ADMIN — ACETAMINOPHEN 325 MG: 325 TABLET, FILM COATED ORAL at 22:26

## 2020-11-20 RX ADMIN — SODIUM CHLORIDE, PRESERVATIVE FREE 10 ML: 5 INJECTION INTRAVENOUS at 22:12

## 2020-11-20 RX ADMIN — HYDROMORPHONE HYDROCHLORIDE 0.5 MG: 1 INJECTION, SOLUTION INTRAMUSCULAR; INTRAVENOUS; SUBCUTANEOUS at 15:17

## 2020-11-20 NOTE — TELEPHONE ENCOUNTER
Contacted the daughter back and was advised that patient's legs have been giving out on her. She has had multiple falls. Fell 3xs yesterday, once Wednesday and this am also. This nurse asked daughter if she had hit her head on any of the falls.concerned due to coumadin use.  Daughter stated no then proceeded to state that she had hit the side of her head. When I contacted them back EMS was on site and transporting to [facility].

## 2020-11-20 NOTE — TELEPHONE ENCOUNTER
PATIENT'S DAUGHTER IS CALLING BECAUSE PATIENT HAS FELL TWO TIMES IN THE PAST TWO DAYS. PLEASE GIVE THEM A CALL BACK TO LET THEM KNOW IF SHE SHOULD GO TO ER OR COME IN TO SEE .      CONCEPCION-DAUGHTER  BEST PH#: [phone]

## 2020-11-21 ENCOUNTER — APPOINTMENT (OUTPATIENT)
Dept: GENERAL RADIOLOGY | Facility: HOSPITAL | Age: 85
End: 2020-11-21

## 2020-11-21 LAB
ALBUMIN SERPL-MCNC: 2.9 G/DL (ref 3.5–5.2)
ALBUMIN/GLOB SERPL: 0.8 G/DL
ALP SERPL-CCNC: 84 U/L (ref 39–117)
ALT SERPL W P-5'-P-CCNC: 19 U/L (ref 1–33)
ANION GAP SERPL CALCULATED.3IONS-SCNC: 6.7 MMOL/L (ref 5–15)
AST SERPL-CCNC: 40 U/L (ref 1–32)
BASOPHILS # BLD AUTO: 0.02 10*3/MM3 (ref 0–0.2)
BASOPHILS NFR BLD AUTO: 0.3 % (ref 0–1.5)
BILIRUB SERPL-MCNC: 4.4 MG/DL (ref 0–1.2)
BUN SERPL-MCNC: 11 MG/DL (ref 8–23)
BUN/CREAT SERPL: 16.2 (ref 7–25)
CALCIUM SPEC-SCNC: 10 MG/DL (ref 8.6–10.5)
CHLORIDE SERPL-SCNC: 108 MMOL/L (ref 98–107)
CO2 SERPL-SCNC: 23.3 MMOL/L (ref 22–29)
CREAT SERPL-MCNC: 0.68 MG/DL (ref 0.57–1)
DEPRECATED RDW RBC AUTO: 45.7 FL (ref 37–54)
EOSINOPHIL # BLD AUTO: 0 10*3/MM3 (ref 0–0.4)
EOSINOPHIL NFR BLD AUTO: 0 % (ref 0.3–6.2)
ERYTHROCYTE [DISTWIDTH] IN BLOOD BY AUTOMATED COUNT: 13.5 % (ref 12.3–15.4)
GFR SERPL CREATININE-BSD FRML MDRD: 100 ML/MIN/1.73
GLOBULIN UR ELPH-MCNC: 3.8 GM/DL
GLUCOSE SERPL-MCNC: 107 MG/DL (ref 65–99)
HCT VFR BLD AUTO: 33.8 % (ref 34–46.6)
HGB BLD-MCNC: 11.3 G/DL (ref 12–15.9)
IMM GRANULOCYTES # BLD AUTO: 0.05 10*3/MM3 (ref 0–0.05)
IMM GRANULOCYTES NFR BLD AUTO: 0.6 % (ref 0–0.5)
INR PPP: 2.27 (ref 0.9–1.1)
LYMPHOCYTES # BLD AUTO: 1.49 10*3/MM3 (ref 0.7–3.1)
LYMPHOCYTES NFR BLD AUTO: 19.1 % (ref 19.6–45.3)
MCH RBC QN AUTO: 30.7 PG (ref 26.6–33)
MCHC RBC AUTO-ENTMCNC: 33.4 G/DL (ref 31.5–35.7)
MCV RBC AUTO: 91.8 FL (ref 79–97)
MONOCYTES # BLD AUTO: 0.97 10*3/MM3 (ref 0.1–0.9)
MONOCYTES NFR BLD AUTO: 12.4 % (ref 5–12)
NEUTROPHILS NFR BLD AUTO: 5.27 10*3/MM3 (ref 1.7–7)
NEUTROPHILS NFR BLD AUTO: 67.6 % (ref 42.7–76)
NRBC BLD AUTO-RTO: 0 /100 WBC (ref 0–0.2)
PLATELET # BLD AUTO: 146 10*3/MM3 (ref 140–450)
PMV BLD AUTO: 10 FL (ref 6–12)
POTASSIUM SERPL-SCNC: 4.1 MMOL/L (ref 3.5–5.2)
PROT SERPL-MCNC: 6.7 G/DL (ref 6–8.5)
PROTHROMBIN TIME: 24.8 SECONDS (ref 11.7–14.2)
RBC # BLD AUTO: 3.68 10*6/MM3 (ref 3.77–5.28)
SARS-COV-2 RNA RESP QL NAA+PROBE: NOT DETECTED
SODIUM SERPL-SCNC: 138 MMOL/L (ref 136–145)
WBC # BLD AUTO: 7.8 10*3/MM3 (ref 3.4–10.8)

## 2020-11-21 PROCEDURE — 99214 OFFICE O/P EST MOD 30 MIN: CPT | Performed by: NURSE PRACTITIONER

## 2020-11-21 PROCEDURE — 85025 COMPLETE CBC W/AUTO DIFF WBC: CPT | Performed by: INTERNAL MEDICINE

## 2020-11-21 PROCEDURE — 97110 THERAPEUTIC EXERCISES: CPT

## 2020-11-21 PROCEDURE — 73610 X-RAY EXAM OF ANKLE: CPT

## 2020-11-21 PROCEDURE — G0378 HOSPITAL OBSERVATION PER HR: HCPCS

## 2020-11-21 PROCEDURE — 97535 SELF CARE MNGMENT TRAINING: CPT

## 2020-11-21 PROCEDURE — 99213 OFFICE O/P EST LOW 20 MIN: CPT | Performed by: INTERNAL MEDICINE

## 2020-11-21 PROCEDURE — 80053 COMPREHEN METABOLIC PANEL: CPT | Performed by: INTERNAL MEDICINE

## 2020-11-21 PROCEDURE — 85610 PROTHROMBIN TIME: CPT | Performed by: INTERNAL MEDICINE

## 2020-11-21 PROCEDURE — 97165 OT EVAL LOW COMPLEX 30 MIN: CPT

## 2020-11-21 PROCEDURE — 97162 PT EVAL MOD COMPLEX 30 MIN: CPT

## 2020-11-21 RX ORDER — ALBUTEROL SULFATE 2.5 MG/3ML
2.5 SOLUTION RESPIRATORY (INHALATION) EVERY 6 HOURS PRN
Status: DISCONTINUED | OUTPATIENT
Start: 2020-11-21 | End: 2020-11-24 | Stop reason: HOSPADM

## 2020-11-21 RX ADMIN — CALCIUM CARBONATE-VITAMIN D TAB 500 MG-200 UNIT 500 MG: 500-200 TAB at 20:32

## 2020-11-21 RX ADMIN — Medication 1000 MCG: at 08:48

## 2020-11-21 RX ADMIN — SODIUM CHLORIDE, PRESERVATIVE FREE 10 ML: 5 INJECTION INTRAVENOUS at 08:48

## 2020-11-21 RX ADMIN — CALCIUM CARBONATE-VITAMIN D TAB 500 MG-200 UNIT 500 MG: 500-200 TAB at 08:48

## 2020-11-21 RX ADMIN — GABAPENTIN 300 MG: 300 CAPSULE ORAL at 20:32

## 2020-11-21 RX ADMIN — PANTOPRAZOLE SODIUM 40 MG: 40 TABLET, DELAYED RELEASE ORAL at 06:27

## 2020-11-21 RX ADMIN — SODIUM CHLORIDE, PRESERVATIVE FREE 10 ML: 5 INJECTION INTRAVENOUS at 20:33

## 2020-11-21 RX ADMIN — FUROSEMIDE 40 MG: 40 TABLET ORAL at 08:48

## 2020-11-21 RX ADMIN — HYDROCODONE BITARTRATE AND ACETAMINOPHEN 1 TABLET: 5; 325 TABLET ORAL at 20:43

## 2020-11-21 RX ADMIN — ACETAMINOPHEN 325 MG: 325 TABLET, FILM COATED ORAL at 20:46

## 2020-11-21 RX ADMIN — HYDROCODONE BITARTRATE AND ACETAMINOPHEN 1 TABLET: 5; 325 TABLET ORAL at 08:53

## 2020-11-21 RX ADMIN — GABAPENTIN 300 MG: 300 CAPSULE ORAL at 08:48

## 2020-11-21 RX ADMIN — MESALAMINE 1.5 G: 0.38 CAPSULE, EXTENDED RELEASE ORAL at 08:48

## 2020-11-22 ENCOUNTER — APPOINTMENT (OUTPATIENT)
Dept: CARDIOLOGY | Facility: HOSPITAL | Age: 85
End: 2020-11-22

## 2020-11-22 LAB
ANION GAP SERPL CALCULATED.3IONS-SCNC: 8.5 MMOL/L (ref 5–15)
B PARAPERT DNA SPEC QL NAA+PROBE: NOT DETECTED
B PERT DNA SPEC QL NAA+PROBE: NOT DETECTED
BH CV LOWER VASCULAR LEFT COMMON FEMORAL AUGMENT: NORMAL
BH CV LOWER VASCULAR LEFT COMMON FEMORAL COMPETENT: NORMAL
BH CV LOWER VASCULAR LEFT COMMON FEMORAL COMPRESS: NORMAL
BH CV LOWER VASCULAR LEFT COMMON FEMORAL PHASIC: NORMAL
BH CV LOWER VASCULAR LEFT COMMON FEMORAL SPONT: NORMAL
BH CV LOWER VASCULAR LEFT DISTAL FEMORAL COMPRESS: NORMAL
BH CV LOWER VASCULAR LEFT GASTRONEMIUS COMPRESS: NORMAL
BH CV LOWER VASCULAR LEFT GREATER SAPH AK COMPRESS: NORMAL
BH CV LOWER VASCULAR LEFT GREATER SAPH BK COMPRESS: NORMAL
BH CV LOWER VASCULAR LEFT LESSER SAPH COMPRESS: NORMAL
BH CV LOWER VASCULAR LEFT MID FEMORAL AUGMENT: NORMAL
BH CV LOWER VASCULAR LEFT MID FEMORAL COMPETENT: NORMAL
BH CV LOWER VASCULAR LEFT MID FEMORAL COMPRESS: NORMAL
BH CV LOWER VASCULAR LEFT MID FEMORAL PHASIC: NORMAL
BH CV LOWER VASCULAR LEFT MID FEMORAL SPONT: NORMAL
BH CV LOWER VASCULAR LEFT PERONEAL COMPRESS: NORMAL
BH CV LOWER VASCULAR LEFT POPLITEAL AUGMENT: NORMAL
BH CV LOWER VASCULAR LEFT POPLITEAL COMPETENT: NORMAL
BH CV LOWER VASCULAR LEFT POPLITEAL COMPRESS: NORMAL
BH CV LOWER VASCULAR LEFT POPLITEAL PHASIC: NORMAL
BH CV LOWER VASCULAR LEFT POPLITEAL SPONT: NORMAL
BH CV LOWER VASCULAR LEFT POSTERIOR TIBIAL COMPRESS: NORMAL
BH CV LOWER VASCULAR LEFT PROFUNDA FEMORAL COMPRESS: NORMAL
BH CV LOWER VASCULAR LEFT PROXIMAL FEMORAL COMPRESS: NORMAL
BH CV LOWER VASCULAR LEFT SAPHENOFEMORAL JUNCTION COMPRESS: NORMAL
BH CV LOWER VASCULAR RIGHT COMMON FEMORAL AUGMENT: NORMAL
BH CV LOWER VASCULAR RIGHT COMMON FEMORAL COMPETENT: NORMAL
BH CV LOWER VASCULAR RIGHT COMMON FEMORAL COMPRESS: NORMAL
BH CV LOWER VASCULAR RIGHT COMMON FEMORAL PHASIC: NORMAL
BH CV LOWER VASCULAR RIGHT COMMON FEMORAL SPONT: NORMAL
BH CV LOWER VASCULAR RIGHT DISTAL FEMORAL COMPRESS: NORMAL
BH CV LOWER VASCULAR RIGHT GASTRONEMIUS COMPRESS: NORMAL
BH CV LOWER VASCULAR RIGHT GREATER SAPH AK COMPRESS: NORMAL
BH CV LOWER VASCULAR RIGHT GREATER SAPH BK COMPRESS: NORMAL
BH CV LOWER VASCULAR RIGHT LESSER SAPH COMPRESS: NORMAL
BH CV LOWER VASCULAR RIGHT MID FEMORAL AUGMENT: NORMAL
BH CV LOWER VASCULAR RIGHT MID FEMORAL COMPETENT: NORMAL
BH CV LOWER VASCULAR RIGHT MID FEMORAL COMPRESS: NORMAL
BH CV LOWER VASCULAR RIGHT MID FEMORAL PHASIC: NORMAL
BH CV LOWER VASCULAR RIGHT MID FEMORAL SPONT: NORMAL
BH CV LOWER VASCULAR RIGHT PERONEAL COMPRESS: NORMAL
BH CV LOWER VASCULAR RIGHT POPLITEAL AUGMENT: NORMAL
BH CV LOWER VASCULAR RIGHT POPLITEAL COMPETENT: NORMAL
BH CV LOWER VASCULAR RIGHT POPLITEAL COMPRESS: NORMAL
BH CV LOWER VASCULAR RIGHT POPLITEAL PHASIC: NORMAL
BH CV LOWER VASCULAR RIGHT POPLITEAL SPONT: NORMAL
BH CV LOWER VASCULAR RIGHT POSTERIOR TIBIAL COMPRESS: NORMAL
BH CV LOWER VASCULAR RIGHT PROFUNDA FEMORAL COMPRESS: NORMAL
BH CV LOWER VASCULAR RIGHT PROXIMAL FEMORAL COMPRESS: NORMAL
BH CV LOWER VASCULAR RIGHT SAPHENOFEMORAL JUNCTION COMPRESS: NORMAL
BUN SERPL-MCNC: 14 MG/DL (ref 8–23)
BUN/CREAT SERPL: 17.7 (ref 7–25)
C PNEUM DNA NPH QL NAA+NON-PROBE: NOT DETECTED
CALCIUM SPEC-SCNC: 10.2 MG/DL (ref 8.6–10.5)
CHLORIDE SERPL-SCNC: 106 MMOL/L (ref 98–107)
CO2 SERPL-SCNC: 19.5 MMOL/L (ref 22–29)
CREAT SERPL-MCNC: 0.79 MG/DL (ref 0.57–1)
DEPRECATED RDW RBC AUTO: 46.9 FL (ref 37–54)
ERYTHROCYTE [DISTWIDTH] IN BLOOD BY AUTOMATED COUNT: 13.7 % (ref 12.3–15.4)
FLUAV SUBTYP SPEC NAA+PROBE: NOT DETECTED
FLUBV RNA ISLT QL NAA+PROBE: NOT DETECTED
GFR SERPL CREATININE-BSD FRML MDRD: 84 ML/MIN/1.73
GLUCOSE SERPL-MCNC: 107 MG/DL (ref 65–99)
HADV DNA SPEC NAA+PROBE: NOT DETECTED
HCOV 229E RNA SPEC QL NAA+PROBE: NOT DETECTED
HCOV HKU1 RNA SPEC QL NAA+PROBE: NOT DETECTED
HCOV NL63 RNA SPEC QL NAA+PROBE: NOT DETECTED
HCOV OC43 RNA SPEC QL NAA+PROBE: NOT DETECTED
HCT VFR BLD AUTO: 34.7 % (ref 34–46.6)
HGB BLD-MCNC: 11.9 G/DL (ref 12–15.9)
HMPV RNA NPH QL NAA+NON-PROBE: NOT DETECTED
HPIV1 RNA SPEC QL NAA+PROBE: NOT DETECTED
HPIV2 RNA SPEC QL NAA+PROBE: NOT DETECTED
HPIV3 RNA NPH QL NAA+PROBE: NOT DETECTED
HPIV4 P GENE NPH QL NAA+PROBE: NOT DETECTED
INR PPP: 2.13 (ref 0.9–1.1)
M PNEUMO IGG SER IA-ACNC: NOT DETECTED
MCH RBC QN AUTO: 32.2 PG (ref 26.6–33)
MCHC RBC AUTO-ENTMCNC: 34.3 G/DL (ref 31.5–35.7)
MCV RBC AUTO: 93.8 FL (ref 79–97)
PLATELET # BLD AUTO: 169 10*3/MM3 (ref 140–450)
PMV BLD AUTO: 10.3 FL (ref 6–12)
POTASSIUM SERPL-SCNC: 4.7 MMOL/L (ref 3.5–5.2)
PROTHROMBIN TIME: 23.5 SECONDS (ref 11.7–14.2)
RBC # BLD AUTO: 3.7 10*6/MM3 (ref 3.77–5.28)
RHINOVIRUS RNA SPEC NAA+PROBE: NOT DETECTED
RSV RNA NPH QL NAA+NON-PROBE: NOT DETECTED
SARS-COV-2 RNA NPH QL NAA+NON-PROBE: NOT DETECTED
SODIUM SERPL-SCNC: 134 MMOL/L (ref 136–145)
WBC # BLD AUTO: 8.88 10*3/MM3 (ref 3.4–10.8)

## 2020-11-22 PROCEDURE — 93970 EXTREMITY STUDY: CPT

## 2020-11-22 PROCEDURE — 0202U NFCT DS 22 TRGT SARS-COV-2: CPT | Performed by: INTERNAL MEDICINE

## 2020-11-22 PROCEDURE — G0378 HOSPITAL OBSERVATION PER HR: HCPCS

## 2020-11-22 PROCEDURE — 99213 OFFICE O/P EST LOW 20 MIN: CPT | Performed by: ORTHOPAEDIC SURGERY

## 2020-11-22 PROCEDURE — 85027 COMPLETE CBC AUTOMATED: CPT | Performed by: INTERNAL MEDICINE

## 2020-11-22 PROCEDURE — 99213 OFFICE O/P EST LOW 20 MIN: CPT | Performed by: INTERNAL MEDICINE

## 2020-11-22 PROCEDURE — 97110 THERAPEUTIC EXERCISES: CPT

## 2020-11-22 PROCEDURE — 80048 BASIC METABOLIC PNL TOTAL CA: CPT | Performed by: INTERNAL MEDICINE

## 2020-11-22 PROCEDURE — 85610 PROTHROMBIN TIME: CPT | Performed by: INTERNAL MEDICINE

## 2020-11-22 PROCEDURE — 87040 BLOOD CULTURE FOR BACTERIA: CPT | Performed by: INTERNAL MEDICINE

## 2020-11-22 RX ADMIN — GABAPENTIN 300 MG: 300 CAPSULE ORAL at 20:42

## 2020-11-22 RX ADMIN — SODIUM CHLORIDE, PRESERVATIVE FREE 10 ML: 5 INJECTION INTRAVENOUS at 20:42

## 2020-11-22 RX ADMIN — Medication 1000 MCG: at 08:20

## 2020-11-22 RX ADMIN — PANTOPRAZOLE SODIUM 40 MG: 40 TABLET, DELAYED RELEASE ORAL at 06:18

## 2020-11-22 RX ADMIN — GABAPENTIN 300 MG: 300 CAPSULE ORAL at 08:20

## 2020-11-22 RX ADMIN — MESALAMINE 1.5 G: 0.38 CAPSULE, EXTENDED RELEASE ORAL at 08:20

## 2020-11-22 RX ADMIN — CALCIUM CARBONATE-VITAMIN D TAB 500 MG-200 UNIT 500 MG: 500-200 TAB at 08:20

## 2020-11-22 RX ADMIN — ACETAMINOPHEN 325 MG: 325 TABLET, FILM COATED ORAL at 10:08

## 2020-11-22 RX ADMIN — SODIUM CHLORIDE, PRESERVATIVE FREE 10 ML: 5 INJECTION INTRAVENOUS at 08:20

## 2020-11-22 RX ADMIN — CALCIUM CARBONATE-VITAMIN D TAB 500 MG-200 UNIT 500 MG: 500-200 TAB at 20:42

## 2020-11-22 RX ADMIN — HYDROCODONE BITARTRATE AND ACETAMINOPHEN 1 TABLET: 5; 325 TABLET ORAL at 08:24

## 2020-11-22 RX ADMIN — HYDROCODONE BITARTRATE AND ACETAMINOPHEN 1 TABLET: 5; 325 TABLET ORAL at 20:44

## 2020-11-22 RX ADMIN — FUROSEMIDE 40 MG: 40 TABLET ORAL at 08:20

## 2020-11-23 LAB
ANION GAP SERPL CALCULATED.3IONS-SCNC: 14.2 MMOL/L (ref 5–15)
BACTERIA UR QL AUTO: ABNORMAL /HPF
BILIRUB UR QL STRIP: ABNORMAL
BUN SERPL-MCNC: 17 MG/DL (ref 8–23)
BUN/CREAT SERPL: 22.1 (ref 7–25)
CALCIUM SPEC-SCNC: 11.6 MG/DL (ref 8.6–10.5)
CHLORIDE SERPL-SCNC: 107 MMOL/L (ref 98–107)
CLARITY UR: ABNORMAL
CO2 SERPL-SCNC: 18.8 MMOL/L (ref 22–29)
COD CRY URNS QL: ABNORMAL /HPF
COLOR UR: ABNORMAL
CREAT SERPL-MCNC: 0.77 MG/DL (ref 0.57–1)
DEPRECATED RDW RBC AUTO: 45.8 FL (ref 37–54)
ERYTHROCYTE [DISTWIDTH] IN BLOOD BY AUTOMATED COUNT: 13.3 % (ref 12.3–15.4)
GFR SERPL CREATININE-BSD FRML MDRD: 86 ML/MIN/1.73
GLUCOSE SERPL-MCNC: 121 MG/DL (ref 65–99)
GLUCOSE UR STRIP-MCNC: NEGATIVE MG/DL
HCT VFR BLD AUTO: 35.6 % (ref 34–46.6)
HGB BLD-MCNC: 11.7 G/DL (ref 12–15.9)
HGB UR QL STRIP.AUTO: NEGATIVE
HYALINE CASTS UR QL AUTO: ABNORMAL /LPF
INR PPP: 1.72 (ref 0.9–1.1)
KETONES UR QL STRIP: NEGATIVE
LEUKOCYTE ESTERASE UR QL STRIP.AUTO: ABNORMAL
MCH RBC QN AUTO: 31.2 PG (ref 26.6–33)
MCHC RBC AUTO-ENTMCNC: 32.9 G/DL (ref 31.5–35.7)
MCV RBC AUTO: 94.9 FL (ref 79–97)
MUCOUS THREADS URNS QL MICRO: ABNORMAL /HPF
NITRITE UR QL STRIP: POSITIVE
PH UR STRIP.AUTO: <=5 [PH] (ref 5–8)
PLATELET # BLD AUTO: 201 10*3/MM3 (ref 140–450)
PMV BLD AUTO: 9.9 FL (ref 6–12)
POTASSIUM SERPL-SCNC: 4.2 MMOL/L (ref 3.5–5.2)
PROT UR QL STRIP: ABNORMAL
PROTHROMBIN TIME: 19.9 SECONDS (ref 11.7–14.2)
RBC # BLD AUTO: 3.75 10*6/MM3 (ref 3.77–5.28)
RBC # UR: ABNORMAL /HPF
REF LAB TEST METHOD: ABNORMAL
SODIUM SERPL-SCNC: 140 MMOL/L (ref 136–145)
SP GR UR STRIP: >=1.03 (ref 1–1.03)
SQUAMOUS #/AREA URNS HPF: ABNORMAL /HPF
UROBILINOGEN UR QL STRIP: ABNORMAL
WBC # BLD AUTO: 9.6 10*3/MM3 (ref 3.4–10.8)
WBC UR QL AUTO: ABNORMAL /HPF

## 2020-11-23 PROCEDURE — 85610 PROTHROMBIN TIME: CPT | Performed by: INTERNAL MEDICINE

## 2020-11-23 PROCEDURE — 81001 URINALYSIS AUTO W/SCOPE: CPT | Performed by: INTERNAL MEDICINE

## 2020-11-23 PROCEDURE — 85027 COMPLETE CBC AUTOMATED: CPT | Performed by: INTERNAL MEDICINE

## 2020-11-23 PROCEDURE — 99213 OFFICE O/P EST LOW 20 MIN: CPT | Performed by: NURSE PRACTITIONER

## 2020-11-23 PROCEDURE — 80048 BASIC METABOLIC PNL TOTAL CA: CPT | Performed by: INTERNAL MEDICINE

## 2020-11-23 PROCEDURE — 97110 THERAPEUTIC EXERCISES: CPT

## 2020-11-23 PROCEDURE — G0378 HOSPITAL OBSERVATION PER HR: HCPCS

## 2020-11-23 PROCEDURE — 99212 OFFICE O/P EST SF 10 MIN: CPT | Performed by: NURSE PRACTITIONER

## 2020-11-23 RX ADMIN — Medication 1000 MCG: at 08:08

## 2020-11-23 RX ADMIN — FUROSEMIDE 40 MG: 40 TABLET ORAL at 08:08

## 2020-11-23 RX ADMIN — SODIUM CHLORIDE, PRESERVATIVE FREE 10 ML: 5 INJECTION INTRAVENOUS at 20:00

## 2020-11-23 RX ADMIN — MESALAMINE 1.5 G: 0.38 CAPSULE, EXTENDED RELEASE ORAL at 08:08

## 2020-11-23 RX ADMIN — HYDROCODONE BITARTRATE AND ACETAMINOPHEN 1 TABLET: 5; 325 TABLET ORAL at 20:26

## 2020-11-23 RX ADMIN — GABAPENTIN 300 MG: 300 CAPSULE ORAL at 20:00

## 2020-11-23 RX ADMIN — SODIUM CHLORIDE, PRESERVATIVE FREE 10 ML: 5 INJECTION INTRAVENOUS at 08:08

## 2020-11-23 RX ADMIN — CALCIUM CARBONATE-VITAMIN D TAB 500 MG-200 UNIT 500 MG: 500-200 TAB at 08:08

## 2020-11-23 RX ADMIN — GABAPENTIN 300 MG: 300 CAPSULE ORAL at 08:08

## 2020-11-23 RX ADMIN — PANTOPRAZOLE SODIUM 40 MG: 40 TABLET, DELAYED RELEASE ORAL at 06:14

## 2020-11-23 RX ADMIN — CALCIUM CARBONATE-VITAMIN D TAB 500 MG-200 UNIT 500 MG: 500-200 TAB at 20:26

## 2020-11-24 ENCOUNTER — READMISSION MANAGEMENT (OUTPATIENT)
Dept: CALL CENTER | Facility: HOSPITAL | Age: 85
End: 2020-11-24

## 2020-11-24 VITALS
HEART RATE: 77 BPM | BODY MASS INDEX: 37.64 KG/M2 | SYSTOLIC BLOOD PRESSURE: 135 MMHG | WEIGHT: 220.46 LBS | HEIGHT: 64 IN | RESPIRATION RATE: 16 BRPM | OXYGEN SATURATION: 98 % | TEMPERATURE: 98.5 F | DIASTOLIC BLOOD PRESSURE: 77 MMHG

## 2020-11-24 LAB
INR PPP: 1.57 (ref 0.9–1.1)
PROTHROMBIN TIME: 18.6 SECONDS (ref 11.7–14.2)

## 2020-11-24 PROCEDURE — G0378 HOSPITAL OBSERVATION PER HR: HCPCS

## 2020-11-24 PROCEDURE — 97530 THERAPEUTIC ACTIVITIES: CPT

## 2020-11-24 PROCEDURE — 85610 PROTHROMBIN TIME: CPT | Performed by: INTERNAL MEDICINE

## 2020-11-24 PROCEDURE — 99212 OFFICE O/P EST SF 10 MIN: CPT | Performed by: NURSE PRACTITIONER

## 2020-11-24 PROCEDURE — 97110 THERAPEUTIC EXERCISES: CPT

## 2020-11-24 RX ORDER — HYDROCODONE BITARTRATE AND ACETAMINOPHEN 5; 325 MG/1; MG/1
1 TABLET ORAL EVERY 6 HOURS PRN
Qty: 12 TABLET | Refills: 0 | Status: SHIPPED | OUTPATIENT
Start: 2020-11-24 | End: 2020-12-09

## 2020-11-24 RX ORDER — ACETAMINOPHEN 500 MG
500 TABLET ORAL EVERY 6 HOURS PRN
Start: 2020-11-24

## 2020-11-24 RX ADMIN — HYDROCODONE BITARTRATE AND ACETAMINOPHEN 1 TABLET: 5; 325 TABLET ORAL at 06:12

## 2020-11-24 RX ADMIN — GABAPENTIN 300 MG: 300 CAPSULE ORAL at 08:19

## 2020-11-24 RX ADMIN — MESALAMINE 1.5 G: 0.38 CAPSULE, EXTENDED RELEASE ORAL at 08:19

## 2020-11-24 RX ADMIN — PANTOPRAZOLE SODIUM 40 MG: 40 TABLET, DELAYED RELEASE ORAL at 06:10

## 2020-11-24 RX ADMIN — Medication 1000 MCG: at 08:19

## 2020-11-24 RX ADMIN — CALCIUM CARBONATE-VITAMIN D TAB 500 MG-200 UNIT 500 MG: 500-200 TAB at 08:19

## 2020-11-24 RX ADMIN — FUROSEMIDE 40 MG: 40 TABLET ORAL at 08:19

## 2020-11-24 RX ADMIN — SODIUM CHLORIDE, PRESERVATIVE FREE 10 ML: 5 INJECTION INTRAVENOUS at 08:49

## 2020-11-24 NOTE — OUTREACH NOTE
Prep Survey      Responses   [facility] facility patient discharged from?  [place]   Is LACE score < 7 ?  No   Eligibility  [facility]   Date of Admission  11/20/20   Date of Discharge  11/24/20   Discharge Disposition  Home-Health Care [facility]   Discharge diagnosis  Hemarthrosis of left knee   Does the patient have one of the following disease processes/diagnoses(primary or secondary)?  Other   Does the patient have Home health ordered?  Yes   What is the Home health agency?   [facility]    Is there a DME ordered?  Yes   What D[place]  [facility]   Prep survey completed?  Yes          Ana Mendiola RN

## 2020-11-25 ENCOUNTER — TRANSITIONAL CARE MANAGEMENT TELEPHONE ENCOUNTER (OUTPATIENT)
Dept: CALL CENTER | Facility: HOSPITAL | Age: 85
End: 2020-11-25

## 2020-11-25 ENCOUNTER — TELEPHONE (OUTPATIENT)
Dept: FAMILY MEDICINE CLINIC | Facility: CLINIC | Age: 85
End: 2020-11-25

## 2020-11-25 LAB — INR PPP: 1.31

## 2020-11-25 NOTE — OUTREACH NOTE
Call Center TCM Note      Responses   [facility] patient discharged from?  [place]   Does the patient have one of the following disease processes/diagnoses(primary or secondary)?  Other   TCM attempt successful?  Yes   Call start time  1144   Call end time  1149   Discharge diagnosis  Hemaarthrosis of left knee, left ankle sprain.    Is patient permission given to speak with other caregiver?  Yes   List who call center can speak with  Amina Diana, daughter.    Person spoke with today (if not patient) and relationship  Amina Barros), daughter   Medication alerts for this patient  Warfarin on hold   Meds reviewed with patient/caregiver?  Yes   Is the patient having any side effects they believe may be caused by any medication additions or changes?  No   Does the patient have all medications ordered at discharge?  Yes   Is the patient taking all medications as directed (includes completed medication regime)?  Yes   Does the patient have a primary care provider?   Yes   Does the patient have an appointment with their PCP within 7 days of discharge?  No   Comments regarding PCP  PCP DR Esparza. Family would like My Chart video visit arranged for PCP follow up. No availability seen in Dr Esparza's schedule. Patient does not want appt with NP's or other providers, just Dr Esparza.    Nursing Interventions  -- [Message routed to office with telemedicine appt need. ]   Has the patient kept scheduled appointments due by today?  N/A   What is the Home health agency?   [facility]   Has home health visited the patient within 72 hours of discharge?  Call prior to 72 hours   What DME was ordered?  [facility] for hosptial bed   Has all DME been delivered?  Yes   DME comments  daughter reports that they have hospital bed and wheelchair.    Psychosocial issues?  No   Did the patient receive a copy of their discharge instructions?  Yes   Nursing interventions  Reviewed instructions with patient [daughter]   What is the patient's  perception of their health status since discharge?  Improving   Is the patient/caregiver able to teach back signs and symptoms related to disease process for when to call PCP?  Yes   Is the patient/caregiver able to teach back signs and symptoms related to disease process for when to call 911?  Yes   Is the patient/caregiver able to teach back the hierarchy of who to call/visit for symptoms/problems? PCP, Specialist, Home health nurse, Urgent Care, ED, 911  Yes   TCM call completed?  Yes          Janice Webster RN    11/25/2020, 11:49 EST

## 2020-11-27 ENCOUNTER — APPOINTMENT (OUTPATIENT)
Dept: CARDIOLOGY | Facility: HOSPITAL | Age: 85
End: 2020-11-27

## 2020-11-27 ENCOUNTER — TELEPHONE (OUTPATIENT)
Dept: FAMILY MEDICINE CLINIC | Facility: CLINIC | Age: 85
End: 2020-11-27

## 2020-11-27 ENCOUNTER — ANTICOAGULATION VISIT (OUTPATIENT)
Dept: PHARMACY | Facility: HOSPITAL | Age: 85
End: 2020-11-27

## 2020-11-27 ENCOUNTER — HOSPITAL ENCOUNTER (EMERGENCY)
Facility: HOSPITAL | Age: 85
Discharge: HOME | End: 2020-11-27
Attending: EMERGENCY MEDICINE | Admitting: EMERGENCY MEDICINE

## 2020-11-27 ENCOUNTER — APPOINTMENT (OUTPATIENT)
Dept: GENERAL RADIOLOGY | Facility: HOSPITAL | Age: 85
End: 2020-11-27

## 2020-11-27 VITALS
BODY MASS INDEX: 36.88 KG/M2 | HEART RATE: 80 BPM | HEIGHT: 64 IN | SYSTOLIC BLOOD PRESSURE: 148 MMHG | OXYGEN SATURATION: 96 % | WEIGHT: 216 LBS | TEMPERATURE: 98.5 F | RESPIRATION RATE: 16 BRPM | DIASTOLIC BLOOD PRESSURE: 74 MMHG

## 2020-11-27 DIAGNOSIS — I48.19 ATRIAL FIBRILLATION, PERSISTENT (HCC): ICD-10-CM

## 2020-11-27 DIAGNOSIS — M79.89 BILATERAL HAND SWELLING: Primary | ICD-10-CM

## 2020-11-27 DIAGNOSIS — R06.00 DYSPNEA, UNSPECIFIED TYPE: ICD-10-CM

## 2020-11-27 LAB
ALBUMIN SERPL-MCNC: 2.7 G/DL (ref 3.5–5.2)
ALBUMIN/GLOB SERPL: 0.6 G/DL
ALP SERPL-CCNC: 101 U/L (ref 39–117)
ALT SERPL W P-5'-P-CCNC: 11 U/L (ref 1–33)
ANION GAP SERPL CALCULATED.3IONS-SCNC: 6.8 MMOL/L (ref 5–15)
APTT PPP: 28.1 SECONDS (ref 22.7–35.4)
AST SERPL-CCNC: 29 U/L (ref 1–32)
BACTERIA SPEC AEROBE CULT: NORMAL
BACTERIA SPEC AEROBE CULT: NORMAL
BASOPHILS # BLD AUTO: 0.01 10*3/MM3 (ref 0–0.2)
BASOPHILS NFR BLD AUTO: 0.1 % (ref 0–1.5)
BH CV UPPER VENOUS LEFT AXILLARY AUGMENT: NORMAL
BH CV UPPER VENOUS LEFT AXILLARY COMPETENT: NORMAL
BH CV UPPER VENOUS LEFT AXILLARY COMPRESS: NORMAL
BH CV UPPER VENOUS LEFT AXILLARY PHASIC: NORMAL
BH CV UPPER VENOUS LEFT AXILLARY SPONT: NORMAL
BH CV UPPER VENOUS LEFT BASILIC FOREARM COMPRESS: NORMAL
BH CV UPPER VENOUS LEFT BASILIC UPPER COMPRESS: NORMAL
BH CV UPPER VENOUS LEFT BRACHIAL COMPRESS: NORMAL
BH CV UPPER VENOUS LEFT CEPHALIC FOREARM COMPRESS: NORMAL
BH CV UPPER VENOUS LEFT CEPHALIC UPPER COMPRESS: NORMAL
BH CV UPPER VENOUS LEFT INTERNAL JUGULAR AUGMENT: NORMAL
BH CV UPPER VENOUS LEFT INTERNAL JUGULAR COMPETENT: NORMAL
BH CV UPPER VENOUS LEFT INTERNAL JUGULAR COMPRESS: NORMAL
BH CV UPPER VENOUS LEFT INTERNAL JUGULAR PHASIC: NORMAL
BH CV UPPER VENOUS LEFT INTERNAL JUGULAR SPONT: NORMAL
BH CV UPPER VENOUS LEFT RADIAL COMPRESS: NORMAL
BH CV UPPER VENOUS LEFT SUBCLAVIAN AUGMENT: NORMAL
BH CV UPPER VENOUS LEFT SUBCLAVIAN COMPETENT: NORMAL
BH CV UPPER VENOUS LEFT SUBCLAVIAN COMPRESS: NORMAL
BH CV UPPER VENOUS LEFT SUBCLAVIAN PHASIC: NORMAL
BH CV UPPER VENOUS LEFT SUBCLAVIAN SPONT: NORMAL
BH CV UPPER VENOUS LEFT ULNAR COMPRESS: NORMAL
BH CV UPPER VENOUS RIGHT AXILLARY AUGMENT: NORMAL
BH CV UPPER VENOUS RIGHT AXILLARY COMPETENT: NORMAL
BH CV UPPER VENOUS RIGHT AXILLARY COMPRESS: NORMAL
BH CV UPPER VENOUS RIGHT AXILLARY PHASIC: NORMAL
BH CV UPPER VENOUS RIGHT AXILLARY SPONT: NORMAL
BH CV UPPER VENOUS RIGHT BRACHIAL COMPRESS: NORMAL
BH CV UPPER VENOUS RIGHT INTERNAL JUGULAR AUGMENT: NORMAL
BH CV UPPER VENOUS RIGHT INTERNAL JUGULAR COMPETENT: NORMAL
BH CV UPPER VENOUS RIGHT INTERNAL JUGULAR COMPRESS: NORMAL
BH CV UPPER VENOUS RIGHT INTERNAL JUGULAR PHASIC: NORMAL
BH CV UPPER VENOUS RIGHT INTERNAL JUGULAR SPONT: NORMAL
BH CV UPPER VENOUS RIGHT RADIAL COMPRESS: NORMAL
BH CV UPPER VENOUS RIGHT SUBCLAVIAN AUGMENT: NORMAL
BH CV UPPER VENOUS RIGHT SUBCLAVIAN COMPETENT: NORMAL
BH CV UPPER VENOUS RIGHT SUBCLAVIAN COMPRESS: NORMAL
BH CV UPPER VENOUS RIGHT SUBCLAVIAN PHASIC: NORMAL
BH CV UPPER VENOUS RIGHT SUBCLAVIAN SPONT: NORMAL
BH CV UPPER VENOUS RIGHT ULNAR COMPRESS: NORMAL
BILIRUB SERPL-MCNC: 2.2 MG/DL (ref 0–1.2)
BUN SERPL-MCNC: 14 MG/DL (ref 8–23)
BUN/CREAT SERPL: 22.2 (ref 7–25)
CALCIUM SPEC-SCNC: 10.3 MG/DL (ref 8.6–10.5)
CHLORIDE SERPL-SCNC: 102 MMOL/L (ref 98–107)
CO2 SERPL-SCNC: 26.2 MMOL/L (ref 22–29)
CREAT SERPL-MCNC: 0.63 MG/DL (ref 0.57–1)
DEPRECATED RDW RBC AUTO: 45.7 FL (ref 37–54)
EOSINOPHIL # BLD AUTO: 0.01 10*3/MM3 (ref 0–0.4)
EOSINOPHIL NFR BLD AUTO: 0.1 % (ref 0.3–6.2)
ERYTHROCYTE [DISTWIDTH] IN BLOOD BY AUTOMATED COUNT: 13.5 % (ref 12.3–15.4)
GFR SERPL CREATININE-BSD FRML MDRD: 109 ML/MIN/1.73
GLOBULIN UR ELPH-MCNC: 4.5 GM/DL
GLUCOSE SERPL-MCNC: 112 MG/DL (ref 65–99)
HCT VFR BLD AUTO: 33 % (ref 34–46.6)
HGB BLD-MCNC: 10.9 G/DL (ref 12–15.9)
IMM GRANULOCYTES # BLD AUTO: 0.05 10*3/MM3 (ref 0–0.05)
IMM GRANULOCYTES NFR BLD AUTO: 0.7 % (ref 0–0.5)
INR PPP: 1.16 (ref 0.9–1.1)
LYMPHOCYTES # BLD AUTO: 1.43 10*3/MM3 (ref 0.7–3.1)
LYMPHOCYTES NFR BLD AUTO: 20.2 % (ref 19.6–45.3)
MAGNESIUM SERPL-MCNC: 1.7 MG/DL (ref 1.6–2.4)
MCH RBC QN AUTO: 31 PG (ref 26.6–33)
MCHC RBC AUTO-ENTMCNC: 33 G/DL (ref 31.5–35.7)
MCV RBC AUTO: 93.8 FL (ref 79–97)
MONOCYTES # BLD AUTO: 0.9 10*3/MM3 (ref 0.1–0.9)
MONOCYTES NFR BLD AUTO: 12.7 % (ref 5–12)
NEUTROPHILS NFR BLD AUTO: 4.69 10*3/MM3 (ref 1.7–7)
NEUTROPHILS NFR BLD AUTO: 66.2 % (ref 42.7–76)
NRBC BLD AUTO-RTO: 0 /100 WBC (ref 0–0.2)
NT-PROBNP SERPL-MCNC: 2404 PG/ML (ref 0–1800)
PLATELET # BLD AUTO: 265 10*3/MM3 (ref 140–450)
PMV BLD AUTO: 9.1 FL (ref 6–12)
POTASSIUM SERPL-SCNC: 4.6 MMOL/L (ref 3.5–5.2)
PROT SERPL-MCNC: 7.2 G/DL (ref 6–8.5)
PROTHROMBIN TIME: 14.6 SECONDS (ref 11.7–14.2)
QT INTERVAL: 413 MS
RBC # BLD AUTO: 3.52 10*6/MM3 (ref 3.77–5.28)
SODIUM SERPL-SCNC: 135 MMOL/L (ref 136–145)
TROPONIN T SERPL-MCNC: <0.01 NG/ML (ref 0–0.03)
WBC # BLD AUTO: 7.09 10*3/MM3 (ref 3.4–10.8)

## 2020-11-27 PROCEDURE — 80053 COMPREHEN METABOLIC PANEL: CPT | Performed by: EMERGENCY MEDICINE

## 2020-11-27 PROCEDURE — 71045 X-RAY EXAM CHEST 1 VIEW: CPT

## 2020-11-27 PROCEDURE — 93005 ELECTROCARDIOGRAM TRACING: CPT | Performed by: EMERGENCY MEDICINE

## 2020-11-27 PROCEDURE — 84484 ASSAY OF TROPONIN QUANT: CPT | Performed by: EMERGENCY MEDICINE

## 2020-11-27 PROCEDURE — 99284 EMERGENCY DEPT VISIT MOD MDM: CPT

## 2020-11-27 PROCEDURE — 85025 COMPLETE CBC W/AUTO DIFF WBC: CPT | Performed by: EMERGENCY MEDICINE

## 2020-11-27 PROCEDURE — 85730 THROMBOPLASTIN TIME PARTIAL: CPT | Performed by: EMERGENCY MEDICINE

## 2020-11-27 PROCEDURE — 93010 ELECTROCARDIOGRAM REPORT: CPT | Performed by: INTERNAL MEDICINE

## 2020-11-27 PROCEDURE — 93970 EXTREMITY STUDY: CPT

## 2020-11-27 PROCEDURE — 36415 COLL VENOUS BLD VENIPUNCTURE: CPT

## 2020-11-27 PROCEDURE — 83880 ASSAY OF NATRIURETIC PEPTIDE: CPT | Performed by: EMERGENCY MEDICINE

## 2020-11-27 PROCEDURE — 83735 ASSAY OF MAGNESIUM: CPT | Performed by: EMERGENCY MEDICINE

## 2020-11-27 PROCEDURE — 85610 PROTHROMBIN TIME: CPT | Performed by: EMERGENCY MEDICINE

## 2020-11-27 NOTE — TELEPHONE ENCOUNTER
Pt's daughter Amina called the on-call service and explained that she and pt's home health nurse concerned for pt's new swelling in her left hand. Said she was unable to work with PT because of the pain. She is having a hard time moving the hand and arm.   Amina reports that pt was just discharged from the hospital after a fall and left leg pain. Pt still having pain and lot of trouble moving because of the left leg pain. Also said pt was taken off coumadin. Amina can not explain why. Says they were with pt in the hospital but none of them were told the diagnosis. Amina also does not know why pt on coumadin. I asked about a fib and hx of blood clots but neither the pt or Amina could recall.   I told her because of newly d/c of coumadin and new hand swelling that is painful she should go to the ED for imaging to make sure there is not a blood clot in the arm.     I told Amina that I would review the chart and call her back with regard to her prior admission but not to delay taking her to the hospital because of the concern for new blood clot.     Called Amina back to give her some explanation with prior admission and pt was in ED getting worked up. 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 Says porsche is really having a lot of pain in her legs and ankles, not able to bear weight well and needing a lot of assistance since home from the hospital. Said they wouldn't dare put her in a nursing facility now. Feels like she would die given the level of care and not being able to see the family.   I explained that with her fall she was found to have hemarthrosis of the left knee and left ankle sprain. She does have the ankle brace and wears it but still painful.   Coumadin was treating a fib. Pt's a fib classified as persistent and her 11/5/20 EKG shows a fib. Explained risk of blood clot and stroke off of coumadin that is why she was going to  have close follow up with cards and ortho.

## 2020-11-27 NOTE — PROGRESS NOTES
Anticoagulation Clinic Progress Note    Anticoagulation Summary  As of 2020    INR goal:  2.0-3.0   TTR:  76.1 % (2.1 y)   INR used for dosin.31 (2020)   Warfarin maintenance plan:  2.5 mg every Tue, Sat; 5 mg all other days   Weekly warfarin total:  30 mg   Plan last modified:  Rusty Interiano RP (10/15/2020)   Next INR check:  2020   Priority:  Maintenance   Target end date:  Indefinite    Indications    Atrial fibrillation  persistent (CMS/HCC) [I48.19]             Anticoagulation Episode Summary     INR check location:      Preferred lab:      Send INR reminders to:   [facility]    Comments:        Anticoagulation Care Providers     Provider Role Specialty Phone number    Mary Grace Culp MD Referring Cardiology [phone]          Clinic Interview:  Patient Findings     Positives:  Missed doses, Hospital admission    Comments:  Warfarin on hold --hospital admission for fall, warf held at   discharge on .      INR History:  Anticoagulation Monitoring 10/28/2020 2020 2020   INR 2.58 2.31 1.31   INR Date 10/28/2020 2020 2020   INR Goal 2.0-3.0 2.0-3.0 2.0-3.0   Trend Same Same Same   Last Week Total 30 mg 30 mg 10 mg   Next Week Total 30 mg 30 mg 30 mg   Sun 5 mg 5 mg 5 mg   Mon 5 mg 5 mg 5 mg   Tue 2.5 mg 2.5 mg 2.5 mg   Wed 5 mg 5 mg 5 mg   Thu 5 mg 5 mg 5 mg   Fri 5 mg 5 mg 5 mg   Sat 2.5 mg 2.5 mg 2.5 mg   Visit Report - - -   Some recent data might be hidden       Plan:  1. INR is Subtherapeutic today- see above in Anticoagulation Summary.   Will instruct Brittany Villeda to continue to HOLD their warfarin regimen- see above in Anticoagulation Summary.  2. Follow up when warfarin is restarted--will arrange for [facility] lab draw if not home health  3. They have been instructed to call if any changes in medications, doses, concerns, etc. Daughter Amina expresses understanding and has no further questions at this time.    Yasmeen Pichardo [facility]

## 2020-11-28 NOTE — TELEPHONE ENCOUNTER
Looks like ER eval was unremarkable. I have discussed role and need for anticaogulation extensively in the past with pt and daughter. On occasion they have been forgetful in the past. She needs to stay on anticoagulation was long as ok from an ortho standpoint. aravind

## 2020-12-03 ENCOUNTER — TELEPHONE (OUTPATIENT)
Dept: ORTHOPEDIC SURGERY | Facility: CLINIC | Age: 85
End: 2020-12-03

## 2020-12-03 ENCOUNTER — TELEMEDICINE (OUTPATIENT)
Dept: CARDIOLOGY | Facility: CLINIC | Age: 85
End: 2020-12-03

## 2020-12-03 VITALS
HEIGHT: 64 IN | SYSTOLIC BLOOD PRESSURE: 109 MMHG | DIASTOLIC BLOOD PRESSURE: 58 MMHG | HEART RATE: 73 BPM | BODY MASS INDEX: 37.08 KG/M2

## 2020-12-03 DIAGNOSIS — I48.11 LONGSTANDING PERSISTENT ATRIAL FIBRILLATION (HCC): Primary | ICD-10-CM

## 2020-12-03 DIAGNOSIS — Z95.0 PRESENCE OF CARDIAC PACEMAKER: ICD-10-CM

## 2020-12-03 DIAGNOSIS — I51.89 DIASTOLIC DYSFUNCTION: ICD-10-CM

## 2020-12-03 DIAGNOSIS — Z79.01 CHRONIC ANTICOAGULATION: ICD-10-CM

## 2020-12-03 PROCEDURE — 99214 OFFICE O/P EST MOD 30 MIN: CPT | Performed by: NURSE PRACTITIONER

## 2020-12-03 NOTE — PROGRESS NOTES
Date of Office Visit: 2020  Encounter Provider: Tereza Jackson, NIKITA, APRN  Place of Service: [facility] CARDIOLOGY  Patient Name: Brittany Villeda  :1935        Subjective:     Chief Complaint:  Follow-up, paroxysmal atrial fibrillation, chronic anticoagulation therapy currently on hold      History of Present Illness:  Brittany Villeda is a 85 y.o. female patient of Dr. Culp.  I am doing a telehealth visit with patient today and I have reviewed her records.     Patient has a history of  paroxysmal atrial fibrillation, hypertension, obstructive sleep apnea, obesity, imobility, pulmonary hypertension, nonsustained ventricular tachycardia, diastolic dysfunction.     In  patient had nonsustained ventricular tachycardia.  Stress perfusion study was negative for ischemia.  Echocardiogram showed normal left ventricular size and function with moderate LVH.  2012 she had chronic obstructive pulmonary disease exacerbation as well as chest pain that resolved with treatment of her COPD.  2013 she had persistent dizziness in the setting of new onset paroxysmal atrial fibrillation.  She was admitted to the hospital and not felt to have had a stroke.  Symptoms actually resolved with  ear manipulation and were felt to be more vestibular in nature.  She also developed paroxysmal atrial fibrillation with early bradycardia which resolved with treatment of sleep apnea and AV flaco blocker therapy.  She was placed on warfarin.  She also had a heme positive stool with anemia and was seen by gastroenterology and EGD and colonoscopy were done.  EGD showed mild erythema but otherwise stable.  She initiated therapy of BiPAP.  2017 she presented to the ER with chest pain.  Ruled out for MI.  Stress perfusion study was negative for ischemia with normal systolic function.  Zio patch shows sinus rhythm with episodes of SVT that was symptomatic.  2018 EKG showed pauses  in the setting of bifascicular block.  24-hour Holter showed 65 pauses 3 seconds long in duration.  Sinus rhythm was present throughout most of the study.  With complaints of palpitations PACs were noted.  There were also 14 episodes of atrial tachycardia lasting 4 beats.  There is also episode of 2-1 AV block and questionable third-degree block.  Echocardiogram showed normal systolic function, grade 1 diastolic dysfunction, borderline right ventricular enlargement, mild to moderate tricuspid valve regurgitation, and RVSP of 48 mmHg.  A sending aorta was mildly dilated at 3.8 cm.  Pacemaker placement was recommended the patient deferred.  By September 2018 patient was noted to have more symptomatic bradycardia and after much discussion underwent pacemaker placement November 2018.  Patient had a telehealth visit 4/2020 due to coronavirus epidemic. CT chest 8/2020 showed stable dilation or ascending aorta at 3.9cm.  She was seen by Dr. Culp 11/2020 at which point she was feeling well and nstructed to follow-up in 6 moths with SHAHID and 1 year with Dr. Culp.     She was admitted 11/20-11/24.  She was having falls and left knee strain with hemarthrois and warfarin was held. It was recommended that she go to rehab but she declined and went home with family and home health/ PT.  She went back to the ER 11/27 for hand pain and swelling.  She was negative for DVT/SVT. Chronic mild anemia again noted, hgb at baseline.  Cardiac work-up was negative as was chest x-ray.        Patient has called into the office today for a telehealth video follow-up appointment.  Patient's daughter is on the video visit with her today per patient preference.  Patient reports she is home with home health and physical therapy.  Home health is coming 2 days a week and PT 2 days a week.  Knee pain has improved quite a bit this week and has been able to work with PT.  Knee still swollen but with improvement.  Did have 1 episode of lightheadedness the  first time she tried to work with PT but no recurrence.  She reports that her lower extremity swelling has improved.  Clinically sounds euvolemic.  Denies chest pain or discomfort, shortness of breath, shortness of breath with exertion, palpitations, or syncope.  No falls since hospital discharge. She has upcoming hospital follow-up with PCP and we discussed importance of keeping this.           Past Medical History:   Diagnosis Date   • Anemia    • Arrhythmia    • Arthritis    • Asthma    • Bradycardia    • Chest pain    • Colitis    • COPD (chronic obstructive pulmonary disease) (CMS/HCC)    • Diastolic dysfunction    • Essential hypertension 5/12/2016   • GERD (gastroesophageal reflux disease)    • Heart block    • Hypertension    • Hypertensive heart disease    • Hyperthyroidism    • Kidney stone    • Leukopenia    • Low back pain    • MGUS (monoclonal gammopathy of unknown significance)    • Nephrolithiasis    • Obesity    • Paroxysmal atrial fibrillation (CMS/HCC)    • Peptic ulceration    • PSVT (paroxysmal supraventricular tachycardia) (CMS/HCC)    • Pulmonary hypertension (CMS/HCC)    • Rectal bleed    • Sleep apnea    • Trifascicular block    • 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 CATH INVASIVE LOCATION;  Service: Cardiology   • CHOLECYSTECTOMY     • COLONOSCOPY  06/16/2014    colitis, cryptitis,  tics, NBIH, TA w/low grade dysplasia   • COLONOSCOPY N/A 10/28/2019    Procedure: COLONOSCOPY WITH COLD AND HOT POLYPECTOMIES;  Surgeon: Micaela English MD;  Location: [facility] ENDOSCOPY;  Service: Gastroenterology   • HEMORRHOIDECTOMY     • HYSTERECTOMY     • KNEE ARTHROPLASTY     •  MYOMECTOMY     • SHOULDER SURGERY     • SINUS SURGERY     • TOE NAIL AMPUTATION  03/04/2019   • TONSILLECTOMY       Outpatient Medications Prior to Visit   Medication Sig Dispense Refill   • acetaminophen (TYLENOL) 500 MG tablet Take 1 tablet by mouth Every 6 (Six) Hours As Needed for Mild Pain .     • albuterol sulfate  (90 Base) MCG/ACT inhaler Inhale 2 puffs Every 6 (Six) Hours As Needed for Wheezing. 1 inhaler 0   • calcium carbonate-cholecalciferol 500-400 MG-UNIT tablet tablet Take 1 tablet by mouth 2 (two) times a day.     • furosemide (Lasix) 40 MG tablet Take 1 tablet by mouth Daily. 90 tablet 1   • gabapentin (NEURONTIN) 300 MG capsule Take 1 capsule by mouth 2 (two) times a day. 180 capsule 1   • HYDROcodone-acetaminophen (NORCO) 5-325 MG per tablet Take 1 tablet by mouth Every 6 (Six) Hours As Needed for Moderate Pain . 12 tablet 0   • mesalamine (APRISO) 0.375 g 24 hr capsule TAKE 4 CAPSULES DAILY 360 capsule 1   • omeprazole (priLOSEC) 20 MG capsule TAKE 1 CAPSULE EVERY DAY 90 capsule 0   • vitamin B-12 (CYANOCOBALAMIN) 1000 MCG tablet Take 1,000 mcg by mouth Daily.     • Wheat Dextrin (BENEFIBER DRINK MIX PO) Take  by mouth 2 (two) times a day.       No facility-administered medications prior to visit.        Allergies as of 12/03/2020 - Reviewed 12/03/2020   Allergen Reaction Noted   • Codeine Hallucinations 11/30/2017   • Amitriptyline Rash 05/12/2016   • Amoxicillin-pot clavulanate Rash 05/12/2016   • Aspirin Unknown - Low Severity 05/12/2016   • Bactrim [sulfamethoxazole-trimethoprim] Rash 05/12/2016   • Carisoprodol-aspirin-codeine Palpitations 09/12/2016   • Iodinated diagnostic agents Rash 05/12/2016   • Latex Rash 09/12/2016   • Naproxen Rash 05/12/2016   • Nsaids Unknown - Low Severity 05/12/2016   • Soma compound with codeine [carisoprodol-aspirin-codeine] Rash 09/12/2016   • Sulfa antibiotics Rash 05/12/2016   • Tramadol Palpitations 04/12/2019     Social History     Socioeconomic  "History   • Marital status:      Spouse name: Not on file   • Number of children: 10   • Years of education: High School   • Highest education level: Not on file   Occupational History   • Occupation: Caregiver     Employer: RETIRED     Comment: worked for [employer]   Tobacco Use   • Smoking status: Former Smoker     Packs/day: 1.50     Years: 10.00     Pack years: 15.00     Start date:      Quit date:      Years since quittin.9   • Smokeless tobacco: Never Used   • Tobacco comment: QUIT SMOKING    Substance and Sexual Activity   • Alcohol use: No     Comment: Daily caffeine use - one cup of coffee   • Drug use: Defer   • Sexual activity: Defer     Family History   Problem Relation Age of Onset   • Diabetes Mother    • Breast cancer Sister    • Kidney cancer Sister    • Heart disease Sister    • Prostate cancer Brother    • Prostate cancer Brother    • Prostate cancer Brother    • Malig Hyperthermia Neg Hx        Review of Systems   Cardiovascular: Negative for chest pain, dyspnea on exertion, leg swelling, palpitations and syncope.        SEE HPI   Respiratory: Negative for shortness of breath.    Musculoskeletal: Positive for falls (Hx falls but since discharge), joint pain (Knee, much improved) and joint swelling (Improved, per patient).   Gastrointestinal: Negative for melena.   Genitourinary: Negative for hematuria.   Neurological: Negative for dizziness.   Psychiatric/Behavioral: Negative for altered mental status.          Objective:     Vitals:    20 1051   BP: 109/58   Pulse: 73   Height: 162.6 cm (64\")     Body mass index is 37.08 kg/m².        PHYSICAL EXAM:  Alert and oriented.  Resting comfortably in bed.  Respirations appear even, unlabored, normal rate.  Daughter at bedside.          Assessment:       Diagnosis Plan   1. Longstanding persistent atrial fibrillation (CMS/HCC)     2. Diastolic dysfunction     3. Presence of cardiac pacemaker     4. Chronic " anticoagulation           Plan:     1. Left knee hemarthrosis:  They are going to call orthopedic provider today to schedule hospital follow-up.  Were supposed to be seen this week.  Discussed the importance of following up with ortho on if/when she is able to resume her warfarin from an orthopedic standpoint and notify in the office.  2. Persistent atrial fibrillation: warfarin currently on hold. Per recent telephone note, MD does feel she needs to resume warfarin if/when cleared by ortho.  Patient and family will follow up on this and notify the office as soon as possible as to what they find out.  They are aware of risks of not being on anticoagulation.  3. Pacemaker: continue with routine device checks.   4. History diastolic dysfunction: sounds clinically stable and overall euvolemic.  Watch for signs of dehydration (dizziness, lightheadedness) or fluid overload (edema, shortness of breath, weight gain).  5. Dilated ascending aorta: stable on 8/2020 imaging.   6. Pulmonary hypertension: denies dyspnea  7. Obstructive sleep apnea: untreated  8. History of mediastinal adenopathy: Followed by outside provider    Patient to schedule 6-8 week hospital follow-up appointment with Dr. Culp or follow-up sooner if needed for any new, recurrent, or worsening symptoms or other issues or concerns.    This patient has consented to a telehealth visit via video. The visit was scheduled as a telehealth video visit to comply with patient safety concerns in accordance with CDC recommendations.  All vitals recorded within this visit are reported by the patient.  I spent 28 minutes in total including but not limited to the 18 minutes spent in direct conversation with this patient.            Your medication list          Accurate as of December 3, 2020 11:59 PM. If you have any questions, ask your nurse or doctor.            CONTINUE taking these medications      Instructions Last Dose Given Next Dose Due   acetaminophen 500 MG  tablet  Commonly known as: TYLENOL      Take 1 tablet by mouth Every 6 (Six) Hours As Needed for Mild Pain .       albuterol sulfate  (90 Base) MCG/ACT inhaler  Commonly known as: PROVENTIL HFA;VENTOLIN HFA;PROAIR HFA      Inhale 2 puffs Every 6 (Six) Hours As Needed for Wheezing.       BENEFIBER DRINK MIX PO      Take  by mouth 2 (two) times a day.       calcium carbonate-cholecalciferol 500-400 MG-UNIT tablet tablet      Take 1 tablet by mouth 2 (two) times a day.       furosemide 40 MG tablet  Commonly known as: Lasix      Take 1 tablet by mouth Daily.       gabapentin 300 MG capsule  Commonly known as: NEURONTIN      Take 1 capsule by mouth 2 (two) times a day.       HYDROcodone-acetaminophen 5-325 MG per tablet  Commonly known as: NORCO      Take 1 tablet by mouth Every 6 (Six) Hours As Needed for Moderate Pain .       mesalamine 0.375 g 24 hr capsule  Commonly known as: APRISO      TAKE 4 CAPSULES DAILY       omeprazole 20 MG capsule  Commonly known as: priLOSEC      TAKE 1 CAPSULE EVERY DAY       vitamin B-12 1000 MCG tablet  Commonly known as: CYANOCOBALAMIN      Take 1,000 mcg by mouth Daily.              I did not stop or change the above medications.  Patient's medication list was updated to reflect medications they are currently taking including medication changes made by other providers.            Thanks,    Tereza Jackson DNP, APRN  12/03/2020         Dictated utilizing Dragon dictation

## 2020-12-03 NOTE — TELEPHONE ENCOUNTER
Provider: DR ANJEL MOSCOSO  Caller: CONCEPCION SOSA  Relationship to Patient: DAUGHTER  Phone Number:[phone]    Reason for Call: PATIENT'S CARDIOLOGIST ASKING WHEN PATIENT CAN RESUME TAKING WARFARIN. SAYS [facility] STOPPED WARFARIN WHEN PATIENT HAD BLEEDING IN HER KNEE

## 2020-12-04 ENCOUNTER — ANTICOAGULATION VISIT (OUTPATIENT)
Dept: PHARMACY | Facility: HOSPITAL | Age: 85
End: 2020-12-04

## 2020-12-04 ENCOUNTER — READMISSION MANAGEMENT (OUTPATIENT)
Dept: CALL CENTER | Facility: HOSPITAL | Age: 85
End: 2020-12-04

## 2020-12-04 DIAGNOSIS — I48.19 ATRIAL FIBRILLATION, PERSISTENT (HCC): ICD-10-CM

## 2020-12-04 NOTE — OUTREACH NOTE
Medical Week 2 Survey      Responses   [facility] patient discharged from?  [place]   Does the patient have one of the following disease processes/diagnoses(primary or secondary)?  Other   Week 2 attempt successful?  Yes   Call start time  0743   Discharge diagnosis  Hemaarthrosis of left knee, left ankle sprain.    Call end time  0747   Is patient permission given to speak with other caregiver?  Yes   List who call center can speak with  Amina Diana, daughter.    Medication alerts for this patient  Warfarin on hold   Meds reviewed with patient/caregiver?  Yes   Is the patient having any side effects they believe may be caused by any medication additions or changes?  No   Does the patient have all medications ordered at discharge?  Yes   Is the patient taking all medications as directed (includes completed medication regime)?  Yes   Does the patient have a primary care provider?   Yes   Does the patient have an appointment with their PCP within 7 days of discharge?  Yes   Comments regarding PCP  My chart visit.    Has the patient kept scheduled appointments due by today?  Yes   What is the Home health agency?   [facility]    Has home health visited the patient within 72 hours of discharge?  Yes   What DME was ordered?  [facility] bed   Has all DME been delivered?  Yes   Psychosocial issues?  No   Did the patient receive a copy of their discharge instructions?  Yes   Nursing interventions  Reviewed instructions with patient   What is the patient's perception of their health status since discharge?  Improving   Is the patient/caregiver able to teach back signs and symptoms related to disease process for when to call PCP?  Yes   Is the patient/caregiver able to teach back signs and symptoms related to disease process for when to call 911?  Yes   Is the patient/caregiver able to teach back the hierarchy of who to call/visit for symptoms/problems? PCP, Specialist, Home health nurse, Urgent Care, ED,  241  Yes   Additional teach back comments  Daughter - says she is doing well.   Week 2 Call Completed?  Yes          Leeann Maldonado RN

## 2020-12-04 NOTE — PROGRESS NOTES
Anticoagulation Clinic Progress Note    Anticoagulation Summary  As of 12/4/2020    INR goal:  2.0-3.0   TTR:  75.9 % (2.1 y)   INR used for dosing:  No new INR was available at the time of this encounter.   Warfarin maintenance plan:  2.5 mg every Tue, Sat; 5 mg all other days   Weekly warfarin total:  30 mg   Plan last modified:  Rusty Interiano RP (10/15/2020)   Next INR check:  12/8/2020   Priority:  Maintenance   Target end date:  Indefinite    Indications    Atrial fibrillation  persistent (CMS/HCC) [I48.19]             Anticoagulation Episode Summary     INR check location:      Preferred lab:      Send INR reminders to:  [facility]    Comments:        Anticoagulation Care Providers     Provider Role Specialty Phone number    Mary Grace Culp MD Referring Cardiology [phone]          Clinic Interview:  Patient Findings     Positives:  Missed doses, Other complaints    Negatives:  Signs/symptoms of thrombosis, Signs/symptoms of bleeding,   Laboratory test error suspected, Change in health, Change in alcohol use,   Change in activity, Upcoming invasive procedure, Emergency department   visit, Upcoming dental procedure, Extra doses, Change in medications,   Change in diet/appetite, Hospital admission, Bruising    Comments:  Warfarin had been on hold r/t knee hemarthrosis s/p fall.   Ortho cleared warfarin to be resumed 12/3 (see 12/3 telephone note).   Reports no warfarin was administered yesterday. Currently has [facility] through ~12/16.       Clinical Outcomes     Negatives:  Major bleeding event, Thromboembolic event,   Anticoagulation-related hospital admission, Anticoagulation-related ED   visit, Anticoagulation-related fatality    Comments:  Warfarin had been on hold r/t knee hemarthrosis s/p fall.   Ortho cleared warfarin to be resumed 12/3 (see 12/3 telephone note).   Reports no warfarin was administered yesterday. Currently has [facility] through ~12/16.          INR History:  Anticoagulation Monitoring 11/12/2020 11/27/2020 12/4/2020   INR 2.31 1.31 -   INR Date 11/11/2020 11/25/2020 -   INR Goal 2.0-3.0 2.0-3.0 2.0-3.0   Trend Same Same Same   Last Week Total 30 mg 10 mg 0 mg   Next Week Total 30 mg 30 mg 25 mg   Sun 5 mg 5 mg 5 mg   Mon 5 mg 5 mg 5 mg   Tue 2.5 mg 2.5 mg -   Wed 5 mg 5 mg -   Thu 5 mg 5 mg -   Fri 5 mg 5 mg Hold (12/4)   Sat 2.5 mg 2.5 mg 2.5 mg   Visit Report - - -   Some recent data might be hidden       Plan:  1. INR is unavailable today- see above in Anticoagulation Summary.   Will instruct Brittany Villeda to resume their usual warfarin regimen tomorrow (pt specifically requests to begin tomorrow due to usually taking it in AM rather than PM - see above in Anticoagulation Summary.  2. Follow up on 12/8/20 w/ [facility]  3. They have been instructed to call if any changes in medications, doses, concerns, etc. Patient expresses understanding and has no further questions at this time.    Rusty Interiano [facility]

## 2020-12-08 ENCOUNTER — ANTICOAGULATION VISIT (OUTPATIENT)
Dept: PHARMACY | Facility: HOSPITAL | Age: 85
End: 2020-12-08

## 2020-12-08 DIAGNOSIS — I48.19 ATRIAL FIBRILLATION, PERSISTENT (HCC): ICD-10-CM

## 2020-12-08 LAB — INR PPP: 1.2

## 2020-12-08 NOTE — PROGRESS NOTES
Anticoagulation Clinic Progress Note    Anticoagulation Summary  As of 2020    INR goal:  2.0-3.0   TTR:  74.9 % (2.2 y)   INR used for dosin.20 (2020)   Warfarin maintenance plan:  2.5 mg every Tue, Sat; 5 mg all other days   Weekly warfarin total:  30 mg   Plan last modified:  Rusty Interiano [facility] (10/15/2020)   Next INR check:  2020   Priority:  Maintenance   Target end date:  Indefinite    Indications    Atrial fibrillation  persistent (CMS/HCC) [I48.19]             Anticoagulation Episode Summary     INR check location:      Preferred lab:      Send IN[facility]ers [facility]:   [facility]    Comments:        Anticoagulation Care Providers     Provider Role Specialty Phone number    Mary Grace Culp MD Referring Cardiology [phone]          INR History:  Anticoagulation Monitoring 2020   INR 1.31 - 1.20   INR Date 2020 - 2020   INR Goal 2.0-3.0 2.0-3.0 2.0-3.0   Trend Same Same Same   Last Week Total 10 mg 0 mg 12.5 mg   Next Week Total 30 mg 25 mg 37.5 mg   Sun 5 mg 5 mg -   Mon 5 mg 5 mg -   Tue 2.5 mg - 7.5 mg ()   Wed 5 mg - 7.5 mg ()   Thu 5 mg - 5 mg   Fri 5 mg Hold () -   Sat 2.5 mg 2.5 mg -   Visit Report - - -   Some recent data might be hidden       Plan:  1. INR is Subtherapeutic today- see above in Anticoagulation Summary.   Provided instructions to Maria Antonia with [facility] to give a booster dose today and tomorrow of 7.5mg - see above in Anticoagulation Summary.  2. Follow up in 3 days      Janice Hart [facility]

## 2020-12-11 ENCOUNTER — ANTICOAGULATION VISIT (OUTPATIENT)
Dept: PHARMACY | Facility: HOSPITAL | Age: 85
End: 2020-12-11

## 2020-12-11 ENCOUNTER — TELEPHONE (OUTPATIENT)
Dept: FAMILY MEDICINE CLINIC | Facility: CLINIC | Age: 85
End: 2020-12-11

## 2020-12-11 DIAGNOSIS — I48.19 ATRIAL FIBRILLATION, PERSISTENT (HCC): ICD-10-CM

## 2020-12-11 LAB — INR PPP: 1.7

## 2020-12-11 NOTE — PROGRESS NOTES
Anticoagulation Clinic Progress Note    Anticoagulation Summary  As of 2020    INR goal:  2.0-3.0   TTR:  74.6 % (2.2 y)   INR used for dosin.70 (2020)   Warfarin maintenance plan:  2.5 mg every Tue, Sat; 5 mg all other days   Weekly warfarin total:  30 mg   Plan last modified:  Rusty Interiano RPH (10/15/2020)   Next INR check:  2020   Priority:  Maintenance   Target end date:  Indefinite    Indications    Atrial fibrillation  persistent (CMS/HCC) [I48.19]             Anticoagulation Episode Summary     INR check location:      Preferred lab:      [facility]    Comments:        Anticoagulation Care Providers     Provider Role Specialty Phone number    Mary Grace Culp MD Referring Cardiology [phone]          INR History:  Anticoagulation Monitoring 2020   INR - 1.20 1.70   INR Date - 2020   INR Goal 2.0-3.0 2.0-3.0 2.0-3.0   Trend Same Same Same   Last Week Total 0 mg 12.5 mg 32.5 mg   Next Week Total 25 mg 37.5 mg 30 mg   Sun 5 mg - 5 mg   Mon 5 mg - 5 mg   Tue - 7.5 mg () 2.5 mg   Wed - 7.5 mg () -   Thu - 5 mg -   Fri Hold () - 5 mg   Sat 2.5 mg - 2.5 mg   Visit Report - - -   Some recent data might be hidden       Plan:  1. INR is Subtherapeutic today- see above in Anticoagulation Summary.   Provided instructions to [facility]ia [facility] to Continue their warfarin regimen- see above in Anticoagulation Summary.  Called [phone]  2. Follow up in 5 days      Yasmeen Pichardo RPH

## 2020-12-11 NOTE — TELEPHONE ENCOUNTER
TODD [place] CALLED WITH INR 1.7  PROTIME 19.8      SHE IS ALSO REQUESTING ANOTHER ORDER FOR ANOTHER ONE WEEK 3   TODD CALL BACK NUMBER [phone]

## 2020-12-16 ENCOUNTER — ANTICOAGULATION VISIT (OUTPATIENT)
Dept: PHARMACY | Facility: HOSPITAL | Age: 85
End: 2020-12-16

## 2020-12-16 DIAGNOSIS — I48.19 ATRIAL FIBRILLATION, PERSISTENT (HCC): ICD-10-CM

## 2020-12-16 LAB — INR PPP: 2.1

## 2020-12-16 NOTE — TELEPHONE ENCOUNTER
"TODD FROM [facility] CALLED STATING THAT THE PATIENT'S INR THIS MORNING (12/16/20) WAS 2.1 AND HER PROTIME WAS 25.6.    TODD STATED THAT THE PATIENT HAS GONE BACK TO HER \"ORIGINAL\" DOSE OF THE WARFARIN (COUMADIN), AND SHE IS TAKING 2.5 MG OF THIS MEDICATION ON TUESDAYS AND THURSDAYS, AND 5 MG EVERY OTHER DAY (SUNDAYS, MONDAYS, WEDNESDAYS, FRIDAYS, SATURDAYS).    IF THERE ARE ANY QUESTIONS OR CONCERNS PLEASE CALL [facility] [phone].  "

## 2020-12-16 NOTE — PROGRESS NOTES
Anticoagulation Clinic Progress Note    Anticoagulation Summary  As of 2020    INR goal:  2.0-3.0   TTR:  74.3 % (2.2 y)   INR used for dosin.10 (2020)   Warfarin maintenance plan:  2.5 mg every Tue, Sat; 5 mg all other days   Weekly warfarin total:  30 mg   Plan last modified:  Rusty Interiano [facility] (10/15/2020)   Next INR check:  2020   Priority:  Maintenance   Target end date:  Indefinite    Indications    Atrial fibrillation  persistent (CMS/HCC) [I48.19]             Anticoagulation Episode Summary     INR check location:      Preferred lab:      [facility]:   [facility] [place]    Comments:        Anticoagulation Care Providers     Provider Role Specialty Phone number    Mary Grace Culp MD Referring Cardiology [phone]          INR History:  Anticoagulation Monitoring 2020   INR 1.20 1.70 2.10   INR Date 2020   INR Goal 2.0-3.0 2.0-3.0 2.0-3.0   Trend Same Same Same   Last Week Total 12.5 mg 32.5 mg 32.5 mg   Next Week Total 37.5 mg 30 mg 30 mg   Sun - 5 mg 5 mg   Mon - 5 mg 5 mg   Tue 7.5 mg () 2.5 mg 2.5 mg   Wed 7.5 mg () - 5 mg   Thu 5 mg - 5 mg   Fri - 5 mg 5 mg   Sat - 2.5 mg 2.5 mg   Visit Report - - -   Some recent data might be hidden       Plan:  1. INR is Therapeutic today- see above in Anticoagulation Summary.   Provided instructions to Maria Antonia with [facility] to Continue their warfarin regimen- see above in Anticoagulation Summary.  2. Follow up in 1 week with [place] or may need to schedule in clinic in 1-2 weeks.  Her insurance likely will not cover home [facility] draw.      Yasmeen Pichardo [facility]

## 2020-12-17 ENCOUNTER — EPISODE CHANGES (OUTPATIENT)
Dept: CASE MANAGEMENT | Facility: OTHER | Age: 85
End: 2020-12-17

## 2020-12-23 ENCOUNTER — TELEPHONE (OUTPATIENT)
Dept: FAMILY MEDICINE CLINIC | Facility: CLINIC | Age: 85
End: 2020-12-23

## 2020-12-23 ENCOUNTER — ANTICOAGULATION VISIT (OUTPATIENT)
Dept: PHARMACY | Facility: HOSPITAL | Age: 85
End: 2020-12-23

## 2020-12-23 ENCOUNTER — TELEMEDICINE (OUTPATIENT)
Dept: FAMILY MEDICINE CLINIC | Facility: CLINIC | Age: 85
End: 2020-12-23

## 2020-12-23 DIAGNOSIS — I10 ESSENTIAL HYPERTENSION: Primary | ICD-10-CM

## 2020-12-23 DIAGNOSIS — I48.19 ATRIAL FIBRILLATION, PERSISTENT (HCC): ICD-10-CM

## 2020-12-23 LAB — INR PPP: 3.3

## 2020-12-23 PROCEDURE — 99213 OFFICE O/P EST LOW 20 MIN: CPT | Performed by: FAMILY MEDICINE

## 2020-12-23 NOTE — PROGRESS NOTES
Anticoagulation Clinic Progress Note    Anticoagulation Summary  As of 12/23/2020    INR goal:  2.0-3.0   TTR:  74.3 % (2.2 y)   INR used for dosing:  3.30 (12/23/2020)   Warfarin maintenance plan:  2.5 mg every Tue, Sat; 5 mg all other days   Weekly warfarin total:  30 mg   Plan last modified:  Rusty Interiano RPH (10/15/2020)   Next INR check:  12/30/2020   Priority:  Maintenance   Target end date:  Indefinite    Indications    Atrial fibrillation  persistent (CMS/HCC) [I48.19]             Anticoagulation Episode Summary     INR check location:      Preferred lab:      [facility]    Comments:        Anticoagulation Care Providers     Provider Role Specialty Phone number    Mary Grace Culp MD Referring Cardiology [phone]          INR History:  Anticoagulation Monitoring 12/11/2020 12/16/2020 12/23/2020   INR 1.70 2.10 3.30   INR Date 12/11/2020 12/16/2020 12/23/2020   INR Goal 2.0-3.0 2.0-3.0 2.0-3.0   Trend Same Same Same   Last Week Total 32.5 mg 32.5 mg 30 mg   Next Week Total 30 mg 30 mg 27.5 mg   Sun 5 mg 5 mg 5 mg   Mon 5 mg 5 mg 5 mg   Tue 2.5 mg 2.5 mg 2.5 mg   Wed - 5 mg 2.5 mg (12/23)   Thu - 5 mg 5 mg   Fri 5 mg 5 mg 5 mg   Sat 2.5 mg 2.5 mg 2.5 mg   Visit Report - - -   Some recent data might be hidden       Plan:  1. INR is Supratherapeutic today- see above in Anticoagulation Summary.   Provided [facility] to Change their warfarin regimen- see above in Anticoagulation Summary.  2. Follow up in 1 week      Rusty Interiano RPH

## 2020-12-23 NOTE — TELEPHONE ENCOUNTER
KATELYN RUTH, WITH [facility], IS CALLING REQUESTING AN ORDER TO CONTINUE PT IN HOME WITH PATIENT 2 TIMES A WEEK FOR 4 WEEKS.    KATELYN CAN BE REACHED AT  [phone]

## 2020-12-23 NOTE — TELEPHONE ENCOUNTER
Hub staff attempted to follow warm transfer process and was unsuccessful     Caller: KATELYN RUTH    Relationship to patient:     Best call back number: [phone]    Patient is needing: KATELYN RUTH RETURNED CALL BACK REGARDING [facility]

## 2020-12-23 NOTE — PROGRESS NOTES
Subjective   Brittany Villeda is a 85 y.o. female.     Chief Complaint   Patient presents with   • Hypertension        History of Present Illness    You have chosen to receive care through a telehealth visit.  Do you consent to use a video/audio connection for your medical care today? Yes     FaceTime.  Excellent A/V connection.   Also present.    Hypertension.  She stopped the Lasix last visit.  She is not had any dependent edema.  She is mostly still in bed although she is continues to physical therapy at home.  She is able to get up to the side of the bed now.  The left knee hemarthrosis is much improved although she has occasional swelling on and off.  But the physical therapist is continue to working with her.  And the daughter has been pleased also.    Chronic pain syndrome.  Mostly osteoarthritis pain of the back and elsewhere.  She is taking the gabapentin still twice a day.  I want her to decrease it to once a day at nighttime.  I am concerned about possible sedation.    Persistent atrial fibrillation.  Her INR is being monitored by cardiology.  Her INR was recently 3.3 and they are holding her Coumadin today.        The following portions of the patient's history were reviewed and updated as appropriate: allergies, current medications, past family history, past medical history, past social history, past surgical history and problem list.          Review of Systems   Constitutional: Negative.    Respiratory: Negative.    Psychiatric/Behavioral: Negative for dysphoric mood ( Has been in good spirits.).       Objective   There were no vitals taken for this visit.  Physical Exam  Constitutional:       Comments: She is more interactive and talkative compared to last visit.  No evidence of depression.  No evidence of respiratory distress.   HENT:      Head: Atraumatic.   Neck:      Musculoskeletal: Normal range of motion.   Pulmonary:      Effort: Pulmonary effort is normal. No respiratory distress.    Skin:     Coloration: Skin is not pale.   Neurological:      Mental Status: She is alert and oriented to person, place, and time.      Coordination: Coordination normal.   Psychiatric:         Behavior: Behavior normal.       Blood pressure at home today is 110/80.  They state has been running those numbers no higher no lower.  Off Lasix.    Assessment/Plan   Diagnoses and all orders for this visit:    1. Essential hypertension (Primary)      Hypertension follow-up.  Continue off the furosemide.  At this point no need for medication.    Hemarthrosis left knee.  She is able to get up to the side of the bed now.  She continues home physical therapy.    Persistent atrial fibrillation.  Followed by cardiology.  They are monitoring her warfarin.    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  Were going to wean off the gabapentin over 1 week.  Take nighttime only for 1 week then stop.  I will see her back in 6 weeks for another FaceTime visit.  They will call with questions.    Duration of visit approximately 12 minutes.

## 2020-12-29 ENCOUNTER — ANTICOAGULATION VISIT (OUTPATIENT)
Dept: PHARMACY | Facility: HOSPITAL | Age: 85
End: 2020-12-29

## 2020-12-29 DIAGNOSIS — I48.19 ATRIAL FIBRILLATION, PERSISTENT (HCC): ICD-10-CM

## 2020-12-29 LAB — INR PPP: 2.5

## 2020-12-29 NOTE — PROGRESS NOTES
Anticoagulation Clinic Progress Note    Anticoagulation Summary  As of 2020    INR goal:  2.0-3.0   TTR:  74.2 % (2.2 y)   INR used for dosin.50 (2020)   Warfarin maintenance plan:  2.5 mg every Tue, Sat; 5 mg all other days   Weekly warfarin total:  30 mg   Plan last modified:  Rusty Interiano RPH (10/15/2020)   Next INR check:  2021   Priority:  Maintenance   Target end date:  Indefinite    Indications    Atrial fibrillation  persistent (CMS/HCC) [I48.19]             Anticoagulation Episode Summary     INR check location:      Preferred lab:      Send INR reminders to:   [facility]    Comments:        Anticoagulation Care Providers     Provider Role Specialty Phone number    Mary Grace Culp MD Referring Cardiology [phone]          INR History:  Anticoagulation Monitoring 2020   INR 2.10 3.30 2.50   INR Date 2020   INR Goal 2.0-3.0 2.0-3.0 2.0-3.0   Trend Same Same Same   Last Week Total 32.5 mg 30 mg 27.5 mg   Next Week Total 30 mg 27.5 mg 30 mg   Sun 5 mg 5 mg 5 mg   Mon 5 mg 5 mg 5 mg   Tue 2.5 mg 2.5 mg 2.5 mg   Wed 5 mg 2.5 mg () 5 mg   Thu 5 mg 5 mg 5 mg   Fri 5 mg 5 mg 5 mg   Sat 2.5 mg 2.5 mg 2.5 mg   Visit Report - - -   Some recent data might be hidden       Plan:  1. INR is Therapeutic today- see above in Anticoagulation Summary.   Provided instructions to Maria Antonia with [facility] to Continue their warfarin regimen- see above in Anticoagulation Summary.  2. Follow up in 1 week      Yasmeen Pichardo RPH

## 2021-01-06 ENCOUNTER — ANTICOAGULATION VISIT (OUTPATIENT)
Dept: PHARMACY | Facility: HOSPITAL | Age: 86
End: 2021-01-06

## 2021-01-06 ENCOUNTER — TELEPHONE (OUTPATIENT)
Dept: ONCOLOGY | Facility: CLINIC | Age: 86
End: 2021-01-06

## 2021-01-06 DIAGNOSIS — I48.19 ATRIAL FIBRILLATION, PERSISTENT (HCC): ICD-10-CM

## 2021-01-06 LAB — INR PPP: 4.1

## 2021-01-06 NOTE — TELEPHONE ENCOUNTER
Caller: johnny    Relationship to patient: daughter    Best call back number: [phone]    Daughter states that the pt is bed ridden and will not be able to make it to her appt on 1/7/21 and will need to resched for a later date. Unable to resched within hub timeframe.

## 2021-01-06 NOTE — PROGRESS NOTES
Anticoagulation Clinic Progress Note    Anticoagulation Summary  As of 2021    INR goal:  2.0-3.0   TTR:  73.8 % (2.3 y)   INR used for dosin.10 (2021)   Warfarin maintenance plan:  2.5 mg every Tue, Thu, Sat; 5 mg all other days   Weekly warfarin total:  27.5 mg   Plan last modified:  Rusty Interiano RPH (2021)   Next INR check:  2021   Priority:  Maintenance   Target end date:  Indefinite    Indications    Atrial fibrillation  persistent (CMS/HCC) [I48.19]             Anticoagulation Episode Summary     INR check location:      Preferred lab:      [facility]    Comments:        Anticoagulation Care Providers     Provider Role Specialty Phone number    Mary Grace Culp MD Referring Cardiology [phone]          INR History:  Anticoagulation Monitoring 2020   INR 3.30 2.50 4.10   INR Date 2020   INR Goal 2.0-3.0 2.0-3.0 2.0-3.0   Trend Same Same Down   Last Week Total 30 mg 27.5 mg 30 mg   Next Week Total 27.5 mg 30 mg 25 mg   Sun 5 mg 5 mg 5 mg   Mon 5 mg 5 mg 5 mg   Tue 2.5 mg 2.5 mg 2.5 mg   Wed 2.5 mg () 5 mg 2.5 mg ()   Thu 5 mg 5 mg 2.5 mg   Fri 5 mg 5 mg 5 mg   Sat 2.5 mg 2.5 mg 2.5 mg   Visit Report - - -   Some recent data might be hidden       Plan:  1. INR is Supratherapeutic today- see above in Anticoagulation Summary.   Provided instructions to Maria Antonia with [facility] to Change their warfarin regimen- see above in Anticoagulation Summary.  2. Follow up in 1 week      Rusty Interiano RPH

## 2021-01-07 ENCOUNTER — APPOINTMENT (OUTPATIENT)
Dept: LAB | Facility: HOSPITAL | Age: 86
End: 2021-01-07

## 2021-01-13 ENCOUNTER — ANTICOAGULATION VISIT (OUTPATIENT)
Dept: PHARMACY | Facility: HOSPITAL | Age: 86
End: 2021-01-13

## 2021-01-13 DIAGNOSIS — I48.19 ATRIAL FIBRILLATION, PERSISTENT (HCC): ICD-10-CM

## 2021-01-13 LAB — INR PPP: 5.8

## 2021-01-13 NOTE — PROGRESS NOTES
Anticoagulation Clinic Progress Note    Anticoagulation Summary  As of 2021    INR goal:  2.0-3.0   TTR:  73.2 % (2.3 y)   INR used for dosin.80 (2021)   Warfarin maintenance plan:  2.5 mg every Tue, Thu, Sat; 5 mg all other days   Weekly warfarin total:  27.5 mg   Plan last modified:  Rusty Interiano, KAUSHIK (2021)   Next INR check:  2021   Priority:  Maintenance   Target end date:  Indefinite    Indications    Atrial fibrillation  persistent (CMS/HCC) [I48.19]             Anticoagulation Episode Summary     INR check location:      Preferred lab:      Send INR reminders to:   [facility] CLIN[facility] [place]    Comments:        Anticoagulation Care Providers     Provider Role Specialty Phone number    Mary Grace Culp MD Referring Cardiology [phone]        Pertinent info:  Softer stools on 1/10/21-21. Cranberry juice 8 oz ~1.5 wks ago. Pt reports she only took 2.5 mg 3x in past week rather than 4x. Nurse reports pt has lost some weight in recent months and is less mobile as she previously was.     INR History:  Anticoagulation Monitoring 2020   INR 2.50 4.10 5.80   INR Date 2020   INR Goal 2.0-3.0 2.0-3.0 2.0-3.0   Trend Same Down Same   Last Week Total 27.5 mg 30 mg 27.5 mg   Next Week Total 30 mg 25 mg 15 mg   Sun 5 mg 5 mg 2.5 mg ()   Mon 5 mg 5 mg -   Tue 2.5 mg 2.5 mg -   Wed 5 mg 2.5 mg () Hold ()   Thu 5 mg 2.5 mg Hold ()   Fri 5 mg 5 mg 2.5 mg (1/15)   Sat 2.5 mg 2.5 mg 2.5 mg   Visit Report - - -   Some recent data might be hidden       Plan:  1. INR is Supratherapeutic today- see above in Anticoagulation Summary.   Provided instructions to Praveena with [facility] to Change their warfarin regimen- see above in Anticoagulation Summary. Also relayed instructions directly to patient.  2. Follow up in 5 days  3. Advised pt to seek immediate medical attention if s/sx of bleeding develop or fall occurs.        Rusty Interiano, [facility]

## 2021-01-14 ENCOUNTER — APPOINTMENT (OUTPATIENT)
Dept: LAB | Facility: HOSPITAL | Age: 86
End: 2021-01-14

## 2021-01-18 LAB — INR PPP: 2.7

## 2021-01-19 ENCOUNTER — PATIENT OUTREACH (OUTPATIENT)
Dept: CASE MANAGEMENT | Facility: OTHER | Age: 86
End: 2021-01-19

## 2021-01-19 ENCOUNTER — ANTICOAGULATION VISIT (OUTPATIENT)
Dept: PHARMACY | Facility: HOSPITAL | Age: 86
End: 2021-01-19

## 2021-01-19 DIAGNOSIS — I48.19 ATRIAL FIBRILLATION, PERSISTENT (HCC): ICD-10-CM

## 2021-01-19 NOTE — PROGRESS NOTES
Anticoagulation Clinic Progress Note    Anticoagulation Summary  As of 2021    INR goal:  2.0-3.0   TTR:  72.8 % (2.3 y)   INR used for dosin.70 (2021)   Warfarin maintenance plan:  2.5 mg every Tue, Thu, Sat; 5 mg all other days   Weekly warfarin total:  27.5 mg   Plan last modified:  Rusty Interiano [facility] (2021)   Next INR check:  2021   Priority:  Maintenance   Target end date:  Indefinite    Indications    Atrial fibrillation  persistent (CMS/HCC) [I48.19]             Anticoagulation Episode Summary     INR check location:      Preferred lab:      Send INR reminders to:  [facility] CLINICAL [place]    Comments:        Anticoagulation Care Providers     Provider Role Specialty Phone number    Mary Grace Culp MD Referring Cardiology [phone]          INR History:  Anticoagulation Monitoring 2021   INR 4.10 5.80 2.70   INR Date 2021   INR Goal 2.0-3.0 2.0-3.0 2.0-3.0   Trend Down Same Same   Last Week Total 30 mg 27.5 mg 10 mg   Next Week Total 25 mg 15 mg 25 mg   Sun 5 mg 2.5 mg () -   Mon 5 mg - -   Tue 2.5 mg - 2.5 mg   Wed 2.5 mg () Hold () 2.5 mg ()   Thu 2.5 mg Hold () -   Fri 5 mg 2.5 mg (1/15) -   Sat 2.5 mg 2.5 mg -   Visit Report - - -   Some recent data might be hidden       Plan:  1. INR is Therapeutic today- see above in Anticoagulation Summary.   Provided instructions to Nicole with [facility] to Change their warfarin regimen- see above in Anticoagulation Summary.  2. Follow up in 3 days  3. Spoke with pt and daughter and they agreed with warfarin plan as above.    Yasmeen Pichardo [facility]

## 2021-01-19 NOTE — OUTREACH NOTE
Patient Outreach Note    Spoke with patient regarding her health and wellness. Patient states she is doing okay. No problems or concerns at this time. Patient's daughter is not doing well in the hospital and family is waiting for a update. Patient states no problems with her B/P, no swelling, no medication problems. Patient's Coumadin dose was adjusted and will be rechecked Thursday. AWV reminder to be mailed to patient. AD on file. [employer] active. No further outreach scheduled at this time.     Care Plan Note      Responses   Annual Wellness Visit:   Patient Will Schedule   AWV Materials  Send Materials   Health Literacy  Good            Ruth Encinas RN  Ambulatory     1/19/2021, 15:51 EST

## 2021-01-21 ENCOUNTER — ANTICOAGULATION VISIT (OUTPATIENT)
Dept: PHARMACY | Facility: HOSPITAL | Age: 86
End: 2021-01-21

## 2021-01-21 ENCOUNTER — TELEPHONE (OUTPATIENT)
Dept: FAMILY MEDICINE CLINIC | Facility: CLINIC | Age: 86
End: 2021-01-21

## 2021-01-21 DIAGNOSIS — I48.19 ATRIAL FIBRILLATION, PERSISTENT (HCC): ICD-10-CM

## 2021-01-21 LAB — INR PPP: 2.5

## 2021-01-21 NOTE — PROGRESS NOTES
Anticoagulation Clinic Progress Note    Anticoagulation Summary  As of 2021    INR goal:  2.0-3.0   TTR:  72.9 % (2.3 y)   INR used for dosin.50 (2021)   Warfarin maintenance plan:  2.5 mg every day   Weekly warfarin total:  17.5 mg   Plan last modified:  Rusty Interiano RPH (2021)   Next INR check:  2021   Priority:  Maintenance   Target end date:  Indefinite    Indications    Atrial fibrillation  persistent (CMS/HCC) [I48.19]             Anticoagulation Episode Summary     INR check location:      Preferred lab:      Send INR reminders to:   AN[facility] CLINICAL [place]    Comments:        Anticoagulation Care Providers     Provider Role Specialty Phone number    Mary Grace Culp MD Referring Cardiology [phone]          INR History:  Anticoagulation Monitoring 2021   INR 5.80 2.70 2.50   INR Date 2021   INR Goal 2.0-3.0 2.0-3.0 2.0-3.0   Trend Same Same Down   Last Week Total 27.5 mg 10 mg 12.5 mg   Next Week Total 15 mg 25 mg 17.5 mg   Sun 2.5 mg () - 2.5 mg   Mon - - -   Tue - 2.5 mg -   Wed Hold () 2.5 mg () -   Thu Hold () - 2.5 mg   Fri 2.5 mg (1/15) - 2.5 mg   Sat 2.5 mg - 2.5 mg   Visit Report - - -   Some recent data might be hidden       Plan:  1. INR is Therapeutic today- see above in Anticoagulation Summary.   Provided instructions to [facility] to Change their warfarin regimen- see above in Anticoagulation Summary.  2. Follow up in 4 days      Rusty Interiano RPH

## 2021-01-21 NOTE — TELEPHONE ENCOUNTER
KATELYN RUTH FROM [facility] REQUESTED A VERBAL FOR TO CONTINUATION OF PHYSICAL THERAPY FOR  PT    2X A WEEK FOR FIVE WEEKS    KATELYN REQUESTED A CALL [phone]

## 2021-01-27 ENCOUNTER — ANTICOAGULATION VISIT (OUTPATIENT)
Dept: PHARMACY | Facility: HOSPITAL | Age: 86
End: 2021-01-27

## 2021-01-27 DIAGNOSIS — I48.19 ATRIAL FIBRILLATION, PERSISTENT (HCC): ICD-10-CM

## 2021-01-27 LAB — INR PPP: 1.8

## 2021-01-27 NOTE — PROGRESS NOTES
Anticoagulation Clinic Progress Note    Anticoagulation Summary  As of 2021    INR goal:  2.0-3.0   TTR:  72.9 % (2.3 y)   INR used for dosin.80 (2021)   Warfarin maintenance plan:  5 mg every Wed; 2.5 mg all other days   Weekly warfarin total:  20 mg   Plan last modified:  Rusty Interiano RPH (2021)   Next INR check:  2021   Priority:  Maintenance   Target end date:  Indefinite    Indications    Atrial fibrillation  persistent (CMS/HCC) [I48.19]             Anticoagulation Episode Summary     INR check location:      Preferred lab:      Send INR reminders to:   [facility] CLINICAL POOL    Comments:        Anticoagulation Care Providers     Provider Role Specialty Phone number    Mary Grace Culp MD Referring Cardiology [phone]          INR History:  Anticoagulation Monitoring 2021   INR 2.70 2.50 1.80   INR Date 2021   INR Goal 2.0-3.0 2.0-3.0 2.0-3.0   Trend Same Down Up   Last Week Total 10 mg 12.5 mg 17.5 mg   Next Week Total 25 mg 17.5 mg 20 mg   Sun - 2.5 mg 2.5 mg   Mon - - -   Tue 2.5 mg - -   Wed 2.5 mg () - 5 mg   Thu - 2.5 mg 2.5 mg   Fri - 2.5 mg 2.5 mg   Sat - 2.5 mg 2.5 mg   Visit Report - - -   Some recent data might be hidden       Plan:  1. INR is Subtherapeutic today- see above in Anticoagulation Summary.   Provided instructions via secure VM to [facility] to Increase their warfarin regimen- see above in Anticoagulation Summary. Also relayed instructions directly to patient/daughter.   2. Follow up in 5 days      Rusty Interiano RPH

## 2021-01-28 ENCOUNTER — TELEPHONE (OUTPATIENT)
Dept: FAMILY MEDICINE CLINIC | Facility: CLINIC | Age: 86
End: 2021-01-28

## 2021-02-01 ENCOUNTER — ANTICOAGULATION VISIT (OUTPATIENT)
Dept: PHARMACY | Facility: HOSPITAL | Age: 86
End: 2021-02-01

## 2021-02-01 DIAGNOSIS — I48.19 ATRIAL FIBRILLATION, PERSISTENT (HCC): ICD-10-CM

## 2021-02-01 LAB
INR PPP: 1.9
INR PPP: 1.9

## 2021-02-03 ENCOUNTER — TELEMEDICINE (OUTPATIENT)
Dept: FAMILY MEDICINE CLINIC | Facility: CLINIC | Age: 86
End: 2021-02-03

## 2021-02-03 DIAGNOSIS — L89.151 PRESSURE INJURY OF SACRAL REGION, STAGE 1: Primary | ICD-10-CM

## 2021-02-03 PROCEDURE — 99213 OFFICE O/P EST LOW 20 MIN: CPT | Performed by: FAMILY MEDICINE

## 2021-02-03 NOTE — PROGRESS NOTES
Chief Complaint  Wound Check    Subjective          Brittany Villeda presents to [facility] for   History of Present Illness    You have chosen to receive care through a telehealth visit.  Do you consent to use a video/audio connection for your medical care today? Yes      Video visit.  Patient continues to be bedbound.  He has multiple medical problems including hypertension, atrial fibrillation, obesity, and pulmonary hypertension.  Immobility is her #1 from right now.  The family and home health nurse noted a dime sized decubitus ulcer near the gluteal cleft.  It was erythematous and had some bleeding.  They have been applying peroxide and Neosporin and Aquaphor.  The daughter states that the redness is getting much better and the bleeding has stopped.  It is not bothering her the patient.  She is not able to get out of bed yet.  She does have home physical therapy.  They are recommending a bedside table.  She is otherwise doing well.  She is had some nuisance level constipation.  Prune juice makes the stools too loose.  No fever.  In good spirits.    Objective   Vital Signs:   There were no vitals taken for this visit.    Physical Exam  Constitutional:       Appearance: She is obese.   HENT:      Head: Atraumatic.   Neck:      Musculoskeletal: Normal range of motion.   Pulmonary:      Effort: Pulmonary effort is normal. No respiratory distress.   Skin:     Coloration: Skin is not pale.   Neurological:      Mental Status: She is alert and oriented to person, place, and time.      Coordination: Coordination normal.   Psychiatric:         Mood and Affect: Mood normal.         Behavior: Behavior normal.        Result Review :                 Assessment and Plan    Problem List Items Addressed This Visit     None      Visit Diagnoses     Pressure injury of sacral region, stage 1    -  Primary        Pressure ulcer, gluteal cleft/sacral region, dime size.  Improving with localized  care.  I have placed an order for home skin care.  We are also going to get her a bedside table so she can sit up at the side of the bed and eat her meals.  Continuing home physical therapy.  I am also recommending Colace for the mild constipation.    Duration of visit approximately 15 minutes      Follow Up {Instructions Charge Capture  Follow-up Communications :23}  No follow-ups on file.  Patient was given instructions and counseling regarding her condition or for health maintenance advice. Please see specific information pulled into the AVS if appropriate.

## 2021-02-08 ENCOUNTER — ANTICOAGULATION VISIT (OUTPATIENT)
Dept: PHARMACY | Facility: HOSPITAL | Age: 86
End: 2021-02-08

## 2021-02-08 DIAGNOSIS — I48.19 ATRIAL FIBRILLATION, PERSISTENT (HCC): ICD-10-CM

## 2021-02-08 LAB — INR PPP: 1.9

## 2021-02-08 NOTE — PROGRESS NOTES
Anticoagulation Clinic Progress Note    Anticoagulation Summary  As of 2021    INR goal:  2.0-3.0   TTR:  71.9 % (2.3 y)   INR used for dosin.90 (2021)   Warfarin maintenance plan:  5 mg every Mon, Thu; 2.5 mg all other days   Weekly warfarin total:  22.5 mg   No change documented:  Janice Hart [facility]   Plan last modified:  Janice Hart [facility] (2021)   Next INR check:  2/15/2021   Priority:  Maintenance   Target end date:  Indefinite    Indications    Atrial fibrillation  persistent (CMS/HCC) [I48.19]             Anticoagulation Episode Summary     INR check location:      Preferred lab:      Send INR reminders to:   ANJU RANDALL [facility]    Comments:        Anticoagulation Care Providers     Provider Role Specialty Phone number    Mary Grace Culp MD Referring Cardiology [phone]          INR History:  Anticoagulation Monitoring 2021   INR 1.80 1.90 1.90   INR Date 2021   INR Goal 2.0-3.0 2.0-3.0 2.0-3.0   Trend Up Up Same   Last Week Total 17.5 mg 20 mg 22.5 mg   Next Week Total 20 mg 22.5 mg 22.5 mg   Sun 2.5 mg 2.5 mg 2.5 mg   Mon - 5 mg 5 mg   Tue - 2.5 mg 2.5 mg   Wed 5 mg 2.5 mg 2.5 mg   Thu 2.5 mg 5 mg 5 mg   Fri 2.5 mg 2.5 mg 2.5 mg   Sat 2.5 mg 2.5 mg 2.5 mg   Visit Report - - -   Some recent data might be hidden       Plan:  1. INR is Subtherapeutic today- see above in Anticoagulation Summary.   Provided instructions to Maria Antonia with [facility] to Continue her current warfarin regimen- see above in Anticoagulation Summary.  2. Follow up in 1 week      Janice Hart [facility]

## 2021-02-15 ENCOUNTER — ANTICOAGULATION VISIT (OUTPATIENT)
Dept: PHARMACY | Facility: HOSPITAL | Age: 86
End: 2021-02-15

## 2021-02-15 ENCOUNTER — DOCUMENTATION (OUTPATIENT)
Dept: FAMILY MEDICINE CLINIC | Facility: CLINIC | Age: 86
End: 2021-02-15

## 2021-02-15 DIAGNOSIS — I48.19 ATRIAL FIBRILLATION, PERSISTENT (HCC): ICD-10-CM

## 2021-02-15 LAB — INR PPP: 2

## 2021-02-15 RX ORDER — CLONAZEPAM 0.5 MG/1
TABLET ORAL
Qty: 6 TABLET | Refills: 0 | Status: SHIPPED | OUTPATIENT
Start: 2021-02-15 | End: 2021-02-18

## 2021-02-15 NOTE — PROGRESS NOTES
Message from patient's daughter.  The patient's other daughter  of Covid related complications.  Patient is grieving.  Family is asking for something to help her calm down.  I have prescribed clonazepam at a very low dose with very minimal number of pills.  I have sent him a message to use 1/2 tablet to start with.  If it causes too much grogginess they are going to stop using them.  Benefits outweigh risks at this time.  Risks include increased fall risk.  However the patient is pretty much bedbound at this time anyways.

## 2021-02-15 NOTE — PROGRESS NOTES
Anticoagulation Clinic Progress Note    Anticoagulation Summary  As of 2/15/2021    INR goal:  2.0-3.0   TTR:  71.3 % (2.4 y)   INR used for dosin.00 (2/15/2021)   Warfarin maintenance plan:  5 mg every Mon, Thu; 2.5 mg all other days   Weekly warfarin total:  22.5 mg   No change documented:  Rusty Interiano RPH   Plan last modified:  Janice Hart RPH (2021)   Next INR check:  2021   Priority:  Maintenance   Target end date:  Indefinite    Indications    Atrial fibrillation  persistent (CMS/HCC) [I48.19]             Anticoagulation Episode Summary     INR check location:      Preferred lab:      Send INR reminders to:   ANJU RANDALL [facility]    Comments:        Anticoagulation Care Providers     Provider Role Specialty Phone number    Mary Grace Culp MD Referring Cardiology [phone]          INR History:  Anticoagulation Monitoring 2021 2021 2/15/2021   INR 1.90 1.90 2.00   INR Date 2021 2021 2/15/2021   INR Goal 2.0-3.0 2.0-3.0 2.0-3.0   Trend Up Same Same   Last Week Total 20 mg 22.5 mg 22.5 mg   Next Week Total 22.5 mg 22.5 mg 22.5 mg   Sun 2.5 mg 2.5 mg 2.5 mg   Mon 5 mg 5 mg 5 mg   Tue 2.5 mg 2.5 mg 2.5 mg   Wed 2.5 mg 2.5 mg 2.5 mg   Thu 5 mg 5 mg 5 mg   Fri 2.5 mg 2.5 mg 2.5 mg   Sat 2.5 mg 2.5 mg 2.5 mg   Visit Report - - -   Some recent data might be hidden       Plan:  1. INR is Therapeutic today- see above in Anticoagulation Summary.   Provided instructions to Corcoreyn with [facility] to Continue their warfarin regimen- see above in Anticoagulation Summary.  2. Follow up in 1 week      Rusty Interiano RPH

## 2021-02-18 ENCOUNTER — TELEMEDICINE (OUTPATIENT)
Dept: FAMILY MEDICINE CLINIC | Facility: CLINIC | Age: 86
End: 2021-02-18

## 2021-02-18 DIAGNOSIS — R05.9 COUGH: Primary | ICD-10-CM

## 2021-02-18 PROCEDURE — 99213 OFFICE O/P EST LOW 20 MIN: CPT | Performed by: FAMILY MEDICINE

## 2021-02-18 RX ORDER — WARFARIN SODIUM 1 MG/1
5 TABLET ORAL 2 TIMES WEEKLY
COMMUNITY
Start: 2021-05-03 | End: 2021-07-25

## 2021-02-18 NOTE — PROGRESS NOTES
Chief Complaint  Cough    Subjective          Brittany DELROY Villeda presents to [facility]  History of Present Illness    You have chosen to receive care through a telehealth visit.  Do you consent to use a video/audio connection for your medical care today? Yes    FaceTime visit.  Excellent A/V connection.    Cough.  For 2 or 3 weeks.  Dry.  Mostly at nighttime.  It is more of a nuisance for her.  She is had no shortness of breath.  No productive sputum.  She states her heart starts racing at times.  She has known atrial fibrillation.  Followed by cardiology.  She had a recent anticoagulation visit.  She continues on warfarin.  Her blood pressure recently was 128/72 with a normal heart rate when checked.  She has had no fevers chills.  No sore throat.  No diarrhea.  She does not feel winded or short of air.  She keeps the house very warm.  The area is very dry.  She had a CT scan done in August of last year which demonstrated some nonspecific mediastinal adenopathy.  The plan has been to repeat the CT of the chest in spring of this year.    Her daughter recently  from complications of COVID-19.  Patient is coping.  There have been 2 other children, at least with COVID-19.  None currently in the house with active COVID-19.  The sick contact was about a month ago.       Objective   Vital Signs:   There were no vitals taken for this visit.    Physical Exam  Constitutional:       Appearance: Normal appearance.      Comments: Patient is well-known to me.  She is alert and oriented.  She is articulate.  She is having no trouble with recent memory.  She is able to speak in complete sentences.  She does not appear short of air.  She has no tachypnea.  Her color looks good.   HENT:      Head: Atraumatic.   Neck:      Musculoskeletal: Normal range of motion.   Pulmonary:      Effort: Pulmonary effort is normal. No respiratory distress.   Skin:     Coloration: Skin is not pale.   Neurological:       Mental Status: She is alert and oriented to person, place, and time.      Coordination: Coordination normal.   Psychiatric:         Behavior: Behavior normal.        Result Review :                 Assessment and Plan    Diagnoses and all orders for this visit:    1. Cough (Primary)      Intermittent dry cough.  At this point likely nothing urgent.  Given the snowstorm, I do not recommend she come into our office today.  She is not tachypneic and is in no acute distress.  She was potentially exposed to COVID-19 about a month ago.  It is possible the symptoms are related to low-level COVID-19 URI syndrome.  In either case treatment is supportive and she is not short of breath or tachypneic.  Likely not community-acquired pneumonia at this time.  She is due for repeat CT scan in the spring because of some mediastinal adenopathy.  If the cough persists or gets worse sooner, should need further evaluation including office visit and CT scan.  I also spoke with the patient's daughter who understands the plan at this time.  Otherwise I recommend humidifier in her room, the relative humidity has been very low because of the cold temperatures, and she keeps her room very warm which will further drop the relative humidity.  They will contact us with concerns.    Duration of visit total 15 minutes      Follow Up   No follow-ups on file.  Patient was given instructions and counseling regarding her condition or for health maintenance advice. Please see specific information pulled into the AVS if appropriate.

## 2021-02-22 LAB — INR PPP: 1.8

## 2021-02-23 ENCOUNTER — ANTICOAGULATION VISIT (OUTPATIENT)
Dept: PHARMACY | Facility: HOSPITAL | Age: 86
End: 2021-02-23

## 2021-02-23 DIAGNOSIS — I48.19 ATRIAL FIBRILLATION, PERSISTENT (HCC): ICD-10-CM

## 2021-02-23 NOTE — PROGRESS NOTES
Anticoagulation Clinic Progress Note    Anticoagulation Summary  As of 2021    INR goal:  2.0-3.0   TTR:  70.7 % (2.4 y)   INR used for dosin.80 (2021)   Warfarin maintenance plan:  5 mg every Mon, Thu, Sat; 2.5 mg all other days   Weekly warfarin total:  25 mg   Plan last modified:  Rusty Interiano RPH (2021)   Next INR check:  3/1/2021   Priority:  Maintenance   Target end date:  Indefinite    Indications    Atrial fibrillation  persistent (CMS/HCC) [I48.19]             Anticoagulation Episode Summary     INR check location:      Preferred lab:      Send INR reminders to:  [facility] CLINICAL [place]    Comments:        Anticoagulation Care Providers     Provider Role Specialty Phone number    Mary Grace Culp MD Referring Cardiology [phone]          INR History:  Anticoagulation Monitoring 2021 2/15/2021 2021   INR 1.90 2.00 1.80   INR Date 2021 2/15/2021 2021   INR Goal 2.0-3.0 2.0-3.0 2.0-3.0   Trend Same Same Up   Last Week Total 22.5 mg 22.5 mg 22.5 mg   Next Week Total 22.5 mg 22.5 mg 25 mg   Sun 2.5 mg 2.5 mg 2.5 mg   Mon 5 mg 5 mg -   Tue 2.5 mg 2.5 mg 2.5 mg   Wed 2.5 mg 2.5 mg 2.5 mg   Thu 5 mg 5 mg 5 mg   Fri 2.5 mg 2.5 mg 2.5 mg   Sat 2.5 mg 2.5 mg 5 mg   Visit Report - - -   Some recent data might be hidden       Plan:  1. INR is Subtherapeutic today- see above in Anticoagulation Summary.   Provided instructions to Nicole with [place] [facility] to Increase their warfarin regimen- see above in Anticoagulation Summary.  2. Follow up in 1 week      Rusty Interiano RPH

## 2021-02-26 RX ORDER — MESALAMINE 0.38 G/1
CAPSULE, EXTENDED RELEASE ORAL
Qty: 360 CAPSULE | Refills: 0 | Status: SHIPPED | OUTPATIENT
Start: 2021-02-26 | End: 2021-07-15

## 2021-03-01 LAB — INR PPP: 2.1

## 2021-03-02 ENCOUNTER — ANTICOAGULATION VISIT (OUTPATIENT)
Dept: PHARMACY | Facility: HOSPITAL | Age: 86
End: 2021-03-02

## 2021-03-02 DIAGNOSIS — Z23 IMMUNIZATION DUE: ICD-10-CM

## 2021-03-02 DIAGNOSIS — I48.19 ATRIAL FIBRILLATION, PERSISTENT (HCC): ICD-10-CM

## 2021-03-02 NOTE — PROGRESS NOTES
Anticoagulation Clinic Progress Note    Anticoagulation Summary  As of 3/2/2021    INR goal:  2.0-3.0   TTR:  70.4 % (2.4 y)   INR used for dosin.10 (3/1/2021)   Warfarin maintenance plan:  5 mg every Mon, Thu, Sat; 2.5 mg all other days   Weekly warfarin total:  25 mg   Plan last modified:  Rusty Interiano [facility] (2021)   Next INR check:     Priority:  Maintenance   Target end date:  Indefinite    Indications    Atrial fibrillation  persistent (CMS/HCC) [I48.19]             Anticoagulation Episode Summary     INR check location:      Preferred lab:      Send INR reminders to:  [facility]    Comments:        Anticoagulation Care Providers     Provider Role Specialty Phone number    Mary Grace Culp MD Referring Cardiology [phone]          INR History:  Anticoagulation Monitoring 2/15/2021 2021 3/2/2021   INR 2.00 1.80 2.10   INR Date 2/15/2021 2021 3/1/2021   INR Goal 2.0-3.0 2.0-3.0 2.0-3.0   Trend Same Up Same   Last Week Total 22.5 mg 22.5 mg 25 mg   Next Week Total 22.5 mg 25 mg 25 mg   Sun 2.5 mg 2.5 mg 2.5 mg   Mon 5 mg - 5 mg   Tue 2.5 mg 2.5 mg 2.5 mg   Wed 2.5 mg 2.5 mg 2.5 mg   Thu 5 mg 5 mg 5 mg   Fri 2.5 mg 2.5 mg 2.5 mg   Sat 2.5 mg 5 mg 5 mg   Visit Report - - -   Some recent data might be hidden       Plan:  1. INR is Therapeutic today- see above in Anticoagulation Summary.   Provided instructions to [facility] via fax to Continue their warfarin regimen- see above in Anticoagulation Summary.  2. Follow up in 1 week  3.  Faxed to [place] and called patient today.      Yasmeen Pichardo [facility]

## 2021-03-08 ENCOUNTER — TELEPHONE (OUTPATIENT)
Dept: CARDIOLOGY | Facility: CLINIC | Age: 86
End: 2021-03-08

## 2021-03-08 ENCOUNTER — TELEMEDICINE (OUTPATIENT)
Dept: CARDIOLOGY | Facility: CLINIC | Age: 86
End: 2021-03-08

## 2021-03-08 ENCOUNTER — ANTICOAGULATION VISIT (OUTPATIENT)
Dept: PHARMACY | Facility: HOSPITAL | Age: 86
End: 2021-03-08

## 2021-03-08 VITALS — BODY MASS INDEX: 37.08 KG/M2 | HEIGHT: 64 IN

## 2021-03-08 DIAGNOSIS — I48.11 LONGSTANDING PERSISTENT ATRIAL FIBRILLATION (HCC): Primary | ICD-10-CM

## 2021-03-08 DIAGNOSIS — I47.1 PAROXYSMAL SVT (SUPRAVENTRICULAR TACHYCARDIA) (HCC): ICD-10-CM

## 2021-03-08 DIAGNOSIS — I48.19 ATRIAL FIBRILLATION, PERSISTENT (HCC): ICD-10-CM

## 2021-03-08 DIAGNOSIS — Z74.09 IMMOBILITY: ICD-10-CM

## 2021-03-08 DIAGNOSIS — D64.9 ANEMIA, UNSPECIFIED TYPE: ICD-10-CM

## 2021-03-08 DIAGNOSIS — G47.33 OSA (OBSTRUCTIVE SLEEP APNEA): ICD-10-CM

## 2021-03-08 DIAGNOSIS — Z95.0 PACEMAKER: ICD-10-CM

## 2021-03-08 DIAGNOSIS — I27.20 PULMONARY HYPERTENSION (HCC): ICD-10-CM

## 2021-03-08 DIAGNOSIS — Z79.01 CHRONIC ANTICOAGULATION: ICD-10-CM

## 2021-03-08 DIAGNOSIS — Z86.79 HISTORY OF ATRIAL FIBRILLATION: ICD-10-CM

## 2021-03-08 DIAGNOSIS — I71.21 ASCENDING AORTIC ANEURYSM (HCC): ICD-10-CM

## 2021-03-08 LAB — INR PPP: 2.4

## 2021-03-08 PROCEDURE — 99442 PR PHYS/QHP TELEPHONE EVALUATION 11-20 MIN: CPT | Performed by: INTERNAL MEDICINE

## 2021-03-08 RX ORDER — DOCUSATE SODIUM 100 MG/1
100 CAPSULE, LIQUID FILLED ORAL NIGHTLY PRN
COMMUNITY
End: 2022-11-14

## 2021-03-08 NOTE — PROGRESS NOTES
Date of Office Visit: 2021  Encounter Provider: Mary Grace Culp MD  Place of Service: [facility] CARDIOLOGY  Patient Name: Brittany Villeda  :1935    Chief complaint  Paroxysmal atrial fibrillation, hypertension, borderline dilated aortic root, sick sinus syndrome status post pacemaker placement    History of Present Illness  The patient is a pleasant, 85-year-old female with a history of hypertension, obstructive sleep apnea, obesity, immobility, pulmonary hypertension, history of nonsustained ventricular tachycardia, and obstructive sleep apnea.  She has a history of diastolic dysfunction.  In , she had nonsustained ventricular tachycardia.  A stress perfusion study was negative for ischemia.  An echocardiogram revealed normal left ventricular size and function with moderate left ventricular hypertrophy. In 2013, she was seen for persistent dizziness in the setting of new-onset paroxysmal atrial fibrillation.  She was admitted to the hospital and not felt to have had a stroke.  Her symptoms actually resolved with ear manipulation and were felt to be more vestibular in nature.  She also developed paroxysmal atrial fibrillation with early bradycardia which resolved with treatment of her sleep apnea and AV flaco blocker therapy.  She was placed on warfarin.  She also had a heme-positive stool with anemia and was seen by the gastrointestinal doctors and EGD and colonoscopy were performed. The EGD revealed mild erythema but otherwise stable.  She initiated therapy with a BiPAP.  In 2017 presented to the emergency room with chest pain.  She ruled out for myocardial infarction.  A stress perfusion study that was negative for ischemia with normal systolic function.  A Ziopatch revealed sinus rhythm with episodes of supra-ventricular tachycardia that was symptomatic.  In 2018 EKG showed pauses in the setting of bifascicular block.  A 24-hour Holter  revealed 65 pauses 3 seconds longest induration.  Sinus rhythm was present throughout most of the study.  With complaints of palpitations PACs were noted.  Current 22.2% of the monitor time there is also 14 episodes of atrial tachycardia lasting 4 beats.  There were also episodes of 2-1 AV block and questionable third degree.  An echocardiogram revealed normal systolic function grade 1 diastolic dysfunction, borderline right ventricular enlargement, mild to moderate tricuspid valve regurgitation with an RV systolic pressure 48 mmHg.  The ascending aorta was mildly dilated at 3.8 cm.  I recommended a pacemaker placement but she deferred in which she discuss this further with her daughter and is here today regarding this.  By 2018 she was noted to have more symptomatic bradycardia and after much discussion she underwent pacemaker placement in 2018.  In  at follow-up echocardiogram showed an ejection fraction of 56%.  In 2020 asending aorta by CT imaging was stable measuring 3.9 cm.  Mediastinal lymphadenopathy was present.  Large right renal cyst was again noted.    Device check  shows atrial fibrillation with intermittent rapid rates.  She states palpitations have improved she denies any shortness of breath edema dizziness chest pain.  She is not using CPAP.  Unfortunately she is mostly bedbound after she developed hemarthrosis in November.  She did not wish to go to rehab and and this did getting PT at home though it has been challenging as she has required 2 people to assist.  She also had a setback when her daughter  of Covid pneumonia recently.  Her family feels she is slowly improving from this.    Past Medical History:   Diagnosis Date   • Anemia    • Arrhythmia    • Arthritis    • Asthma    • Bradycardia    • Chest pain    • Colitis    • COPD (chronic obstructive pulmonary disease) (CMS/[facility])    • Diastolic dysfunction    • Essential hypertension 2016   • GERD  (gastroesophageal reflux disease)    • Heart block    • Hypertension    • Hypertensive heart disease    • Hyperthyroidism    • Kidney stone    • Leukopenia    • Low back pain    • MGUS (monoclonal gammopathy of unknown significance)    • Nephrolithiasis    • Obesity    • Paroxysmal atrial fibrillation (CMS/HCC)    • Peptic ulceration    • PSVT (paroxysmal supraventricular tachycardia) (CMS/HCC)    • Pulmonary hypertension (CMS/HCC)    • Rectal bleed    • Sleep apnea    • Trifascicular block    • 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)    • Ventricular tachycardia (CMS/HCC)     nonsustained   • Vertigo      Past Surgical History:   Procedure Laterality Date   • BACK SURGERY      lumbar fusion   • CARDIAC ELECTROPHYSIOLOGY PROCEDURE N/A 11/7/2018    Procedure: Pacemaker DC new   [place];  Surgeon: Jesus Granda MD;  Location: [facility] CATH INVASIVE LOCATION;  Service: Cardiology   • CHOLECYSTECTOMY     • COLONOSCOPY  06/16/2014    colitis, cryptitis,  tics, NBIH, TA w/low grade dysplasia   • COLONOSCOPY N/A 10/28/2019    Procedure: COLONOSCOPY WITH COLD AND HOT POLYPECTOMIES;  Surgeon: Micaela English MD;  Location: [facility] ENDOSCOPY;  Service: Gastroenterology   • HEMORRHOIDECTOMY     • HYSTERECTOMY     • KNEE ARTHROPLASTY     • MYOMECTOMY     • SHOULDER SURGERY     • SINUS SURGERY     • TOE NAIL AMPUTATION  03/04/2019   • TONSILLECTOMY       Outpatient Medications Prior to Visit   Medication Sig Dispense Refill   • acetaminophen (TYLENOL) 500 MG tablet Take 1 tablet by mouth Every 6 (Six) Hours As Needed for Mild Pain .     • albuterol sulfate  (90 Base) MCG/ACT inhaler Inhale 2 puffs Every 6 (Six) Hours As Needed for Wheezing. 1 inhaler 0   • calcium carbonate-cholecalciferol 500-400 MG-UNIT tablet tablet Take 1 tablet by mouth 2 (two) times a day.     • docusate sodium (COLACE) 100 MG capsule Take 100 mg by mouth Every Night.     • mesalamine (APRISO) 0.375 g 24 hr capsule TAKE 4  CAPSULES DAILY 360 capsule 0   • omeprazole (priLOSEC) 20 MG capsule TAKE 1 CAPSULE EVERY DAY 90 capsule 0   • vitamin B-12 (CYANOCOBALAMIN) 1000 MCG tablet Take 1,000 mcg by mouth Daily.     • warfarin (COUMADIN) 1 MG tablet Take 5 mg by mouth Daily.     • Wheat Dextrin (BENEFIBER DRINK MIX PO) Take  by mouth Every 2 (Two) Hours As Needed.       No facility-administered medications prior to visit.       Allergies as of 2021 - Reviewed 2021   Allergen Reaction Noted   • Codeine Hallucinations 2017   • Amitriptyline Rash 2016   • Amoxicillin-pot clavulanate Rash 2016   • Aspirin Unknown - Low Severity 2016   • Bactrim [sulfamethoxazole-trimethoprim] Rash 2016   • Carisoprodol-aspirin-codeine Palpitations 2016   • Iodinated diagnostic agents Rash 2016   • Latex Rash 2016   • Naproxen Rash 2016   • Nsaids Unknown - Low Severity 2016   • Soma compound with codeine [carisoprodol-aspirin-codeine] Rash 2016   • Sulfa antibiotics Rash 2016   • Tramadol Palpitations 2019     Social History     Socioeconomic History   • Marital status:      Spouse name: Not on file   • Number of children: 10   • Years of education: High School   • Highest education level: Not on file   Tobacco Use   • Smoking status: Former Smoker     Packs/day: 1.50     Years: 10.00     Pack years: 15.00     Start date:      Quit date:      Years since quittin.2   • Smokeless tobacco: Never Used   • Tobacco comment: QUIT SMOKING    Substance and Sexual Activity   • Alcohol use: No     Comment: Daily caffeine use - one cup of coffee   • Drug use: Defer   • Sexual activity: Defer     Family History   Problem Relation Age of Onset   • Diabetes Mother    • Breast cancer Sister    • Kidney cancer Sister    • Heart disease Sister    • Prostate cancer Brother    • Prostate cancer Brother    • Prostate cancer Brother    • Malig Hyperthermia Neg Hx   "    Review of Systems   Cardiovascular: Positive for chest pain. Negative for dyspnea on exertion, leg swelling, palpitations and syncope.        Objective:     Vitals:    03/08/21 1149   Height: 162.6 cm (64\")     Body mass index is 37.08 kg/m².    Physical Exam: Not performed    Assessment:       Diagnosis Plan   1. Longstanding persistent atrial fibrillation (CMS/HCC)     2. Anemia, unspecified type  CBC & Differential   3. Paroxysmal SVT (supraventricular tachycardia) (CMS/HCC)     4. Pacemaker     5. History of atrial fibrillation     6. Ascending aortic aneurysm (CMS/HCC)     7. Chronic anticoagulation     8. Pulmonary hypertension (CMS/HCC)     9. HUGO (obstructive sleep apnea)     10. Immobility       Plan:       1.  Persistent atrial fibrillation with history of paroxysmal supraventricular tachycardia.  Rates are controlled and she is tolerating Coumadin well without falls or bleeding  2.  Status post permanent pacemaker placement.  Device check next week  3.  Weakness.  We will check a CBC as she was previously mildly anemic in 11/20.  Daughter to also call back with additional blood pressure reading..  4.  History of diastolic dysfunction.   5.  Dilated asending aorta able by CT in August 2020  6.  Pulmonary hypertension.  As above  7.  Untreated HUGO, untreated  8.  Rectal bleeding with hemorroids.  No recent events  9.  Mediastinal adenopathy.  Patient and daughter state they have discussed this with Dr. Esparza who will continue to follow this  10.  Debilitated state.  She continues to work with PT and daughter states that she is slowly improving.  We will check for anemia and hypertension as above    This patient has consented to a telehealth visit via phone. The visit was scheduled as a half video visit to comply with patient safety concerns in accordance with CDC recommendations.  All vitals recorded within this visit are reported by the patient.  I spent 25 minutes in total including but not limited to " the 15 minutes spent in direct conversation with this patient.          Your medication list          Accurate as of March 8, 2021 11:59 PM. If you have any questions, ask your nurse or doctor.            CONTINUE taking these medications      Instructions Last Dose Given Next Dose Due   acetaminophen 500 MG tablet  Commonly known as: TYLENOL      Take 1 tablet by mouth Every 6 (Six) Hours As Needed for Mild Pain .       albuterol sulfate  (90 Base) MCG/ACT inhaler  Commonly known as: PROVENTIL HFA;VENTOLIN HFA;PROAIR HFA      Inhale 2 puffs Every 6 (Six) Hours As Needed for Wheezing.       BENEFIBER DRINK MIX PO      Take  by mouth Every 2 (Two) Hours As Needed.       calcium carbonate-cholecalciferol 500-400 MG-UNIT tablet tablet      Take 1 tablet by mouth 2 (two) times a day.       docusate sodium 100 MG capsule  Commonly known as: COLACE      Take 100 mg by mouth Every Night.       mesalamine 0.375 g 24 hr capsule  Commonly known as: APRISO      TAKE 4 CAPSULES DAILY       omeprazole 20 MG capsule  Commonly known as: priLOSEC      TAKE 1 CAPSULE EVERY DAY       vitamin B-12 1000 MCG tablet  Commonly known as: CYANOCOBALAMIN      Take 1,000 mcg by mouth Daily.       warfarin 1 MG tablet  Commonly known as: COUMADIN      Take 5 mg by mouth Daily.              Patient is no longer taking -.  I corrected the med list to reflect this.  I did not stop these medications.    Dictated utilizing Dragon dictation

## 2021-03-08 NOTE — PROGRESS NOTES
Anticoagulation Clinic Progress Note    Anticoagulation Summary  As of 3/8/2021    INR goal:  2.0-3.0   TTR:  70.6 % (2.4 y)   INR used for dosin.40 (3/8/2021)   Warfarin maintenance plan:  5 mg every Mon, Thu, Sat; 2.5 mg all other days   Weekly warfarin total:  25 mg   Plan last modified:  Rusty Interiano [facility] (2021)   Next INR check:  3/15/2021   Priority:  Maintenance   Target end date:  Indefinite    Indications    Atrial fibrillation  persistent (CMS/HCC) [I48.19]             Anticoagulation Episode Summary     INR check location:      Preferred lab:      [facility]    Comments:        Anticoagulation Care Providers     Provider Role Specialty Phone number    Mary Grace Culp MD Referring Cardiology [phone]          Clinic Interview:      INR History:  Anticoagulation Monitoring 2021 3/2/2021 3/8/2021   INR 1.80 2.10 2.40   INR Date 2021 3/1/2021 3/8/2021   INR Goal 2.0-3.0 2.0-3.0 2.0-3.0   Trend Up Same Same   Last Week Total 22.5 mg 25 mg 25 mg   Next Week Total 25 mg 25 mg 25 mg   Sun 2.5 mg 2.5 mg 2.5 mg   Mon - - 5 mg   Tue 2.5 mg 2.5 mg 2.5 mg   Wed 2.5 mg 2.5 mg 2.5 mg   Thu 5 mg 5 mg 5 mg   Fri 2.5 mg 2.5 mg 2.5 mg   Sat 5 mg 5 mg 5 mg   Visit Report - - -   Some recent data might be hidden       Plan:  1. INR is Therapeutic today- see above in Anticoagulation Summary.   Will instruct Brittany Villeda to Continue their warfarin regimen- see above in Anticoagulation Summary.  2. Follow up in 1 week--[facility]  3. Spoke with patient and faxed order to [facility] . They have been instructed to call if any changes in medications, doses, concerns, etc. Patient expresses understanding and has no further questions at this time.    Yasmeen Pichardo [facility]

## 2021-03-10 ENCOUNTER — TELEPHONE (OUTPATIENT)
Dept: CARDIOLOGY | Facility: CLINIC | Age: 86
End: 2021-03-10

## 2021-03-10 NOTE — TELEPHONE ENCOUNTER
----- Message from Brittany Villeda sent at 3/10/2021  3:10 PM EST -----  Regarding: Visit Follow-Up Question  Contact: [phone]  Dr Culp here is the Bp you requested from visiting nurse Brandi    2/24/21.   128/74    3/1/21.   132/70    3/4/21.   130/74    3/8/21.  132/70

## 2021-03-12 NOTE — TELEPHONE ENCOUNTER
Spoke with Amina (daughter).  Brandi RN with [facility] @ [phone].  They do not draw labs.  They do not have her down for nursing care, only PT and Warfarin checks only.  We can send an order to [facility] Only.  They will sometimes come to the patient.

## 2021-03-12 NOTE — TELEPHONE ENCOUNTER
Please let patient know these were numbers are reasonable.  To see if she can increase her activity a little more.  Also find out when his home health getting her CBC.

## 2021-03-15 ENCOUNTER — ANTICOAGULATION VISIT (OUTPATIENT)
Dept: PHARMACY | Facility: HOSPITAL | Age: 86
End: 2021-03-15

## 2021-03-15 DIAGNOSIS — I48.19 ATRIAL FIBRILLATION, PERSISTENT (HCC): ICD-10-CM

## 2021-03-15 LAB — INR PPP: 2.1

## 2021-03-15 NOTE — PROGRESS NOTES
Anticoagulation Clinic Progress Note    Anticoagulation Summary  As of 3/15/2021    INR goal:  2.0-3.0   TTR:  70.9 % (2.4 y)   INR used for dosin.10 (3/15/2021)   Warfarin maintenance plan:  5 mg every Mon, Thu, Sat; 2.5 mg all other days   Weekly warfarin total:  25 mg   Plan last modified:  Rusty Interiano [facility] (2021)   Next INR check:  3/29/2021   Priority:  Maintenance   Target end date:  Indefinite    Indications    Atrial fibrillation  persistent (CMS/HCC) [I48.19]             Anticoagulation Episode Summary     INR check location:      Preferred lab:      Send INR reminders to:  [facility] CLINICAL [place]    Comments:        Anticoagulation Care Providers     Provider Role Specialty Phone number    Mary Grace Culp MD Referring Cardiology [phone]          INR History:  Anticoagulation Monitoring 3/2/2021 3/8/2021 3/15/2021   INR 2.10 2.40 2.10   INR Date 3/1/2021 3/8/2021 3/15/2021   INR Goal 2.0-3.0 2.0-3.0 2.0-3.0   Trend Same Same Same   Last Week Total 25 mg 25 mg 25 mg   Next Week Total 25 mg 25 mg 25 mg   Sun 2.5 mg 2.5 mg 2.5 mg   Mon - 5 mg 5 mg   Tue 2.5 mg 2.5 mg 2.5 mg   Wed 2.5 mg 2.5 mg 2.5 mg   Thu 5 mg 5 mg 5 mg   Fri 2.5 mg 2.5 mg 2.5 mg   Sat 5 mg 5 mg 5 mg   Visit Report - - -   Some recent data might be hidden       Plan:  1. INR is Therapeutic today- see above in Anticoagulation Summary.   Provided instructions to Maria Antonia with [employer] via fax to Continue their warfarin regimen- see above in Anticoagulation Summary.  2. Follow up in 2 weeks      Yasmeen Pichardo RPH

## 2021-03-15 NOTE — TELEPHONE ENCOUNTER
Spoke with Amina.  Pt is unable to get out and no upcoming appts.  I will call [facility] tomorrow and see if they will go to here.

## 2021-03-15 NOTE — TELEPHONE ENCOUNTER
Order is already in the system.  Please ask either home health to address or see if family can take her someplace where it can be drawn.  See if she has an appointment in the near future with PCP.

## 2021-03-16 NOTE — TELEPHONE ENCOUNTER
Call [facility] again as [facility] does not handle these.  Spoke with Maria Antonia and was referred to [facility] @ [phone].  They will go out and draw the labs.  Faxed order to them at [phone].  Information to Amina.

## 2021-03-24 ENCOUNTER — TELEPHONE (OUTPATIENT)
Dept: FAMILY MEDICINE CLINIC | Facility: CLINIC | Age: 86
End: 2021-03-24

## 2021-03-24 ENCOUNTER — TELEMEDICINE (OUTPATIENT)
Dept: FAMILY MEDICINE CLINIC | Facility: CLINIC | Age: 86
End: 2021-03-24

## 2021-03-24 DIAGNOSIS — M79.604 PAIN IN BOTH LOWER EXTREMITIES: Primary | ICD-10-CM

## 2021-03-24 DIAGNOSIS — M79.605 PAIN IN BOTH LOWER EXTREMITIES: Primary | ICD-10-CM

## 2021-03-24 PROCEDURE — 99213 OFFICE O/P EST LOW 20 MIN: CPT | Performed by: FAMILY MEDICINE

## 2021-03-24 NOTE — TELEPHONE ENCOUNTER
Pt's daughter states she woke up today with pain in both legs from the knee down. She wants a video visit with Dr Esparza today but he only has 1 15 minute slot left. I offered her a video visit with SHAHID but she declined. She only wants to see Dr. Esparza.

## 2021-03-24 NOTE — PROGRESS NOTES
Chief Complaint  Leg Pain    Subjective          Brittany DELROY Villeda presents to [facility]  History of Present Illness    You have chosen to receive care through a telehealth visit.  Do you consent to use a video/audio connection for your medical care today? Yes    FaceTime visit.  Excellent A/V connection.  Daughter also present in the room with the patient.    Patient has been bedbound for a number of months now.  She is undergoing home physical therapy.  For the first time she was able to walk earlier this week.  The next day she developed left hip pain left groin pain from her groin down to her lower extremity.  And also right knee pain with pain down to the foot also.  She has pain when she lifts her legs up in bed.  The pain is mild to moderate.  There was no fall.  There was no known injury.  She has no leg swelling or rash.  She does not feel ill otherwise.  Her next physical therapy appointment is later this week.          Objective   Vital Signs:   There were no vitals taken for this visit.    Physical Exam  Constitutional:       Appearance: Normal appearance.      Comments: Excellent video quality.  Patient appears in no acute distress.  She is not in any respiratory distress.  Examination of the legs on video with excellent lighting reveals atrophy of the calf.  No edema.  No erythema.  No localized swelling.  She is able to flex both hips.  Some pain with left hip flexion.   Neurological:      Mental Status: She is alert.        Result Review :                 Assessment and Plan    Diagnoses and all orders for this visit:    1. Pain in both lower extremities (Primary)      Bilateral lower extremity leg pain after physical therapy.  She has not walked in a couple of months.  Likely musculoskeletal strains.  Less likely compression fracture, disc injury, or other pathological fracture.  At this time I recommend continuing moving around in bed, taking Tylenol, getting up  for meals.  However if the pain is not improving by early next week, should need further evaluation including x-rays.    Duration of visit approximately 15 minutes total      Follow Up   No follow-ups on file.  Patient was given instructions and counseling regarding her condition or for health maintenance advice. Please see specific information pulled into the AVS if appropriate.

## 2021-03-29 ENCOUNTER — ANTICOAGULATION VISIT (OUTPATIENT)
Dept: PHARMACY | Facility: HOSPITAL | Age: 86
End: 2021-03-29

## 2021-03-29 DIAGNOSIS — I48.19 ATRIAL FIBRILLATION, PERSISTENT (HCC): ICD-10-CM

## 2021-03-29 LAB — INR PPP: 1.8

## 2021-03-29 NOTE — PROGRESS NOTES
Anticoagulation Clinic Progress Note    Anticoagulation Summary  As of 3/29/2021    INR goal:  2.0-3.0   TTR:  70.3 % (2.5 y)   INR used for dosin.80 (3/29/2021)   Warfarin maintenance plan:  5 mg every Mon, Thu, Sat; 2.5 mg all other days   Weekly warfarin total:  25 mg   Plan last modified:  Rusty Interiano RP (2021)   Next INR check:  2021   Priority:  Maintenance   Target end date:  Indefinite    Indications    Atrial fibrillation  persistent (CMS/HCC) [I48.19]             Anticoagulation Episode Summary     INR check location:      Preferred lab:      Send INR reminders to:  [facility] CLINICAL [place]    Comments:        Anticoagulation Care Providers     Provider Role Specialty Phone number    Mayr Grace Culp MD Referring Cardiology [phone]          INR History:  Anticoagulation Monitoring 3/8/2021 3/15/2021 3/29/2021   INR 2.40 2.10 1.80   INR Date 3/8/2021 3/15/2021 3/29/2021   INR Goal 2.0-3.0 2.0-3.0 2.0-3.0   Trend Same Same Same   Last Week Total 25 mg 25 mg 25 mg   Next Week Total 25 mg 25 mg 25 mg   Sun 2.5 mg 2.5 mg 2.5 mg   Mon 5 mg 5 mg 5 mg   Tue 2.5 mg 2.5 mg 2.5 mg   Wed 2.5 mg 2.5 mg 2.5 mg   Thu 5 mg 5 mg 5 mg   Fri 2.5 mg 2.5 mg 2.5 mg   Sat 5 mg 5 mg 5 mg   Visit Report - - -   Some recent data might be hidden       Plan:  1. INR is Subtherapeutic today- see above in Anticoagulation Summary.   Provided instructions to [facility] to Continue their warfarin regimen- see above in Anticoagulation Summary.  2. Follow up in 1 week  3. Faxed order back to [phone]      Yasmeen Pichardo [facility]

## 2021-03-29 NOTE — TELEPHONE ENCOUNTER
Spoke with Amina (daughter).  Verbalized understanding.  No other questions.  They have had low WBC and it is watched by Dr Esparza.  (done)

## 2021-04-05 ENCOUNTER — ANTICOAGULATION VISIT (OUTPATIENT)
Dept: PHARMACY | Facility: HOSPITAL | Age: 86
End: 2021-04-05

## 2021-04-05 DIAGNOSIS — I48.19 ATRIAL FIBRILLATION, PERSISTENT (HCC): ICD-10-CM

## 2021-04-05 LAB — INR PPP: 2.4

## 2021-04-05 NOTE — PROGRESS NOTES
Anticoagulation Clinic Progress Note    Anticoagulation Summary  As of 2021    INR goal:  2.0-3.0   TTR:  70.3 % (2.5 y)   INR used for dosin.40 (2021)   Warfarin maintenance plan:  5 mg every Mon, Thu, Sat; 2.5 mg all other days   Weekly warfarin total:  25 mg   No change documented:  Rusty Interiano RPH   Plan last modified:  Rusty Interiano RPH (2021)   Next INR check:  2021   Priority:  Maintenance   Target end date:  Indefinite    Indications    Atrial fibrillation  persistent (CMS/HCC) [I48.19]             Anticoagulation Episode Summary     INR check location:      Preferred lab:      Send INR reminders to:   [facility]IN[facility] [place]    Comments:        Anticoagulation Care Providers     Provider Role Specialty Phone number    Mary Grace Culp MD Referring Cardiology [phone]          INR History:  Anticoagulation Monitoring 3/15/2021 3/29/2021 2021   INR 2.10 1.80 2.40   INR Date 3/15/2021 3/29/2021 2021   INR Goal 2.0-3.0 2.0-3.0 2.0-3.0   Trend Same Same Same   Last Week Total 25 mg 25 mg 25 mg   Next Week Total 25 mg 25 mg 25 mg   Sun 2.5 mg 2.5 mg 2.5 mg   Mon 5 mg 5 mg 5 mg   Tue 2.5 mg 2.5 mg 2.5 mg   Wed 2.5 mg 2.5 mg 2.5 mg   Thu 5 mg 5 mg 5 mg   Fri 2.5 mg 2.5 mg 2.5 mg   Sat 5 mg 5 mg 5 mg   Visit Report - - -   Some recent data might be hidden       Plan:  1. INR is Therapeutic today- see above in Anticoagulation Summary.   Provided instructions to Nicole with [facility] to Continue their warfarin regimen- see above in Anticoagulation Summary.  2. Follow up in 1 week      Rusty Interiano RPH

## 2021-04-07 ENCOUNTER — HOSPITAL ENCOUNTER (OUTPATIENT)
Facility: HOSPITAL | Age: 86
Setting detail: OBSERVATION
Discharge: HOME HEALTH | End: 2021-04-15
Attending: EMERGENCY MEDICINE | Admitting: INTERNAL MEDICINE

## 2021-04-07 ENCOUNTER — APPOINTMENT (OUTPATIENT)
Dept: GENERAL RADIOLOGY | Facility: HOSPITAL | Age: 86
End: 2021-04-07

## 2021-04-07 DIAGNOSIS — R53.1 GENERALIZED WEAKNESS: Primary | ICD-10-CM

## 2021-04-07 DIAGNOSIS — R06.00 ACUTE DYSPNEA: ICD-10-CM

## 2021-04-07 DIAGNOSIS — R29.898 WEAKNESS OF BOTH LOWER EXTREMITIES: ICD-10-CM

## 2021-04-07 LAB
DEPRECATED RDW RBC AUTO: 46.8 FL (ref 37–54)
ERYTHROCYTE [DISTWIDTH] IN BLOOD BY AUTOMATED COUNT: 13.1 % (ref 12.3–15.4)
HCT VFR BLD AUTO: 37.7 % (ref 34–46.6)
HGB BLD-MCNC: 12.2 G/DL (ref 12–15.9)
INR PPP: 2.15 (ref 0.9–1.1)
MCH RBC QN AUTO: 32 PG (ref 26.6–33)
MCHC RBC AUTO-ENTMCNC: 32.4 G/DL (ref 31.5–35.7)
MCV RBC AUTO: 99 FL (ref 79–97)
NRBC BLD AUTO-RTO: 0 /100 WBC (ref 0–0.2)
PLATELET # BLD AUTO: 163 10*3/MM3 (ref 140–450)
PMV BLD AUTO: 9.3 FL (ref 6–12)
PROTHROMBIN TIME: 23.7 SECONDS (ref 11.7–14.2)
RBC # BLD AUTO: 3.81 10*6/MM3 (ref 3.77–5.28)
WBC # BLD AUTO: 3.64 10*3/MM3 (ref 3.4–10.8)

## 2021-04-07 PROCEDURE — 93005 ELECTROCARDIOGRAM TRACING: CPT | Performed by: EMERGENCY MEDICINE

## 2021-04-07 PROCEDURE — 93010 ELECTROCARDIOGRAM REPORT: CPT | Performed by: INTERNAL MEDICINE

## 2021-04-07 PROCEDURE — 80053 COMPREHEN METABOLIC PANEL: CPT | Performed by: EMERGENCY MEDICINE

## 2021-04-07 PROCEDURE — 85610 PROTHROMBIN TIME: CPT | Performed by: EMERGENCY MEDICINE

## 2021-04-07 PROCEDURE — 84484 ASSAY OF TROPONIN QUANT: CPT | Performed by: EMERGENCY MEDICINE

## 2021-04-07 PROCEDURE — 85007 BL SMEAR W/DIFF WBC COUNT: CPT | Performed by: EMERGENCY MEDICINE

## 2021-04-07 PROCEDURE — 85025 COMPLETE CBC W/AUTO DIFF WBC: CPT | Performed by: EMERGENCY MEDICINE

## 2021-04-07 PROCEDURE — 71045 X-RAY EXAM CHEST 1 VIEW: CPT

## 2021-04-07 PROCEDURE — 99285 EMERGENCY DEPT VISIT HI MDM: CPT

## 2021-04-07 PROCEDURE — 83880 ASSAY OF NATRIURETIC PEPTIDE: CPT | Performed by: EMERGENCY MEDICINE

## 2021-04-08 ENCOUNTER — APPOINTMENT (OUTPATIENT)
Dept: CT IMAGING | Facility: HOSPITAL | Age: 86
End: 2021-04-08

## 2021-04-08 PROBLEM — N39.0 ACUTE UTI (URINARY TRACT INFECTION): Status: ACTIVE | Noted: 2021-04-08

## 2021-04-08 PROBLEM — R53.1 GENERALIZED WEAKNESS: Status: ACTIVE | Noted: 2021-04-08

## 2021-04-08 LAB
ALBUMIN SERPL-MCNC: 3.3 G/DL (ref 3.5–5.2)
ALBUMIN/GLOB SERPL: 1 G/DL
ALP SERPL-CCNC: 93 U/L (ref 39–117)
ALT SERPL W P-5'-P-CCNC: 9 U/L (ref 1–33)
ANION GAP SERPL CALCULATED.3IONS-SCNC: 7 MMOL/L (ref 5–15)
ANION GAP SERPL CALCULATED.3IONS-SCNC: 8.4 MMOL/L (ref 5–15)
AST SERPL-CCNC: 16 U/L (ref 1–32)
BACTERIA UR QL AUTO: ABNORMAL /HPF
BASOPHILS # BLD MANUAL: 0.04 10*3/MM3 (ref 0–0.2)
BASOPHILS NFR BLD AUTO: 1.1 % (ref 0–1.5)
BILIRUB SERPL-MCNC: 0.6 MG/DL (ref 0–1.2)
BILIRUB UR QL STRIP: NEGATIVE
BUN SERPL-MCNC: 12 MG/DL (ref 8–23)
BUN SERPL-MCNC: 9 MG/DL (ref 8–23)
BUN/CREAT SERPL: 17.3 (ref 7–25)
BUN/CREAT SERPL: 17.6 (ref 7–25)
CALCIUM SPEC-SCNC: 8.4 MG/DL (ref 8.6–10.5)
CALCIUM SPEC-SCNC: 9.7 MG/DL (ref 8.6–10.5)
CHLORIDE SERPL-SCNC: 106 MMOL/L (ref 98–107)
CHLORIDE SERPL-SCNC: 112 MMOL/L (ref 98–107)
CLARITY UR: CLEAR
CO2 SERPL-SCNC: 21 MMOL/L (ref 22–29)
CO2 SERPL-SCNC: 24.6 MMOL/L (ref 22–29)
COLOR UR: YELLOW
CREAT SERPL-MCNC: 0.52 MG/DL (ref 0.57–1)
CREAT SERPL-MCNC: 0.68 MG/DL (ref 0.57–1)
DEPRECATED RDW RBC AUTO: 44.9 FL (ref 37–54)
EOSINOPHIL # BLD MANUAL: 0.04 10*3/MM3 (ref 0–0.4)
EOSINOPHIL NFR BLD MANUAL: 1.1 % (ref 0.3–6.2)
ERYTHROCYTE [DISTWIDTH] IN BLOOD BY AUTOMATED COUNT: 12.7 % (ref 12.3–15.4)
GFR SERPL CREATININE-BSD FRML MDRD: 100 ML/MIN/1.73
GFR SERPL CREATININE-BSD FRML MDRD: 136 ML/MIN/1.73
GLOBULIN UR ELPH-MCNC: 3.3 GM/DL
GLUCOSE SERPL-MCNC: 108 MG/DL (ref 65–99)
GLUCOSE SERPL-MCNC: 76 MG/DL (ref 65–99)
GLUCOSE UR STRIP-MCNC: NEGATIVE MG/DL
HCT VFR BLD AUTO: 38.5 % (ref 34–46.6)
HGB BLD-MCNC: 13.2 G/DL (ref 12–15.9)
HGB UR QL STRIP.AUTO: NEGATIVE
HYALINE CASTS UR QL AUTO: ABNORMAL /LPF
KETONES UR QL STRIP: NEGATIVE
LEUKOCYTE ESTERASE UR QL STRIP.AUTO: ABNORMAL
LYMPHOCYTES # BLD MANUAL: 1.93 10*3/MM3 (ref 0.7–3.1)
LYMPHOCYTES NFR BLD MANUAL: 10.3 % (ref 5–12)
LYMPHOCYTES NFR BLD MANUAL: 52.9 % (ref 19.6–45.3)
MCH RBC QN AUTO: 33.4 PG (ref 26.6–33)
MCHC RBC AUTO-ENTMCNC: 34.3 G/DL (ref 31.5–35.7)
MCV RBC AUTO: 97.5 FL (ref 79–97)
MONOCYTES # BLD AUTO: 0.37 10*3/MM3 (ref 0.1–0.9)
NEUTROPHILS # BLD AUTO: 1.26 10*3/MM3 (ref 1.7–7)
NEUTROPHILS NFR BLD MANUAL: 34.5 % (ref 42.7–76)
NITRITE UR QL STRIP: POSITIVE
NT-PROBNP SERPL-MCNC: 1228 PG/ML (ref 0–1800)
PH UR STRIP.AUTO: <=5 [PH] (ref 5–8)
PLAT MORPH BLD: NORMAL
PLATELET # BLD AUTO: 142 10*3/MM3 (ref 140–450)
PMV BLD AUTO: 9.1 FL (ref 6–12)
POTASSIUM SERPL-SCNC: 4.4 MMOL/L (ref 3.5–5.2)
POTASSIUM SERPL-SCNC: 4.7 MMOL/L (ref 3.5–5.2)
PROCALCITONIN SERPL-MCNC: 0.05 NG/ML (ref 0–0.25)
PROT SERPL-MCNC: 6.6 G/DL (ref 6–8.5)
PROT UR QL STRIP: NEGATIVE
QT INTERVAL: 452 MS
RBC # BLD AUTO: 3.95 10*6/MM3 (ref 3.77–5.28)
RBC # UR: ABNORMAL /HPF
RBC MORPH BLD: NORMAL
REF LAB TEST METHOD: ABNORMAL
SARS-COV-2 ORF1AB RESP QL NAA+PROBE: NOT DETECTED
SMUDGE CELLS BLD QL SMEAR: ABNORMAL
SODIUM SERPL-SCNC: 139 MMOL/L (ref 136–145)
SODIUM SERPL-SCNC: 140 MMOL/L (ref 136–145)
SP GR UR STRIP: 1.02 (ref 1–1.03)
SQUAMOUS #/AREA URNS HPF: ABNORMAL /HPF
TROPONIN T SERPL-MCNC: <0.01 NG/ML (ref 0–0.03)
UROBILINOGEN UR QL STRIP: ABNORMAL
WBC # BLD AUTO: 3.01 10*3/MM3 (ref 3.4–10.8)
WBC UR QL AUTO: ABNORMAL /HPF

## 2021-04-08 PROCEDURE — 87086 URINE CULTURE/COLONY COUNT: CPT | Performed by: INTERNAL MEDICINE

## 2021-04-08 PROCEDURE — 87186 SC STD MICRODIL/AGAR DIL: CPT | Performed by: INTERNAL MEDICINE

## 2021-04-08 PROCEDURE — G0378 HOSPITAL OBSERVATION PER HR: HCPCS

## 2021-04-08 PROCEDURE — 84145 PROCALCITONIN (PCT): CPT | Performed by: NURSE PRACTITIONER

## 2021-04-08 PROCEDURE — U0004 COV-19 TEST NON-CDC HGH THRU: HCPCS | Performed by: EMERGENCY MEDICINE

## 2021-04-08 PROCEDURE — 97110 THERAPEUTIC EXERCISES: CPT

## 2021-04-08 PROCEDURE — P9612 CATHETERIZE FOR URINE SPEC: HCPCS

## 2021-04-08 PROCEDURE — 85027 COMPLETE CBC AUTOMATED: CPT | Performed by: NURSE PRACTITIONER

## 2021-04-08 PROCEDURE — 70450 CT HEAD/BRAIN W/O DYE: CPT

## 2021-04-08 PROCEDURE — 25010000002 CEFTRIAXONE PER 250 MG: Performed by: NURSE PRACTITIONER

## 2021-04-08 PROCEDURE — 87088 URINE BACTERIA CULTURE: CPT | Performed by: INTERNAL MEDICINE

## 2021-04-08 PROCEDURE — 97162 PT EVAL MOD COMPLEX 30 MIN: CPT

## 2021-04-08 PROCEDURE — 81001 URINALYSIS AUTO W/SCOPE: CPT | Performed by: EMERGENCY MEDICINE

## 2021-04-08 PROCEDURE — 36415 COLL VENOUS BLD VENIPUNCTURE: CPT | Performed by: NURSE PRACTITIONER

## 2021-04-08 PROCEDURE — 80048 BASIC METABOLIC PNL TOTAL CA: CPT | Performed by: NURSE PRACTITIONER

## 2021-04-08 RX ORDER — SODIUM CHLORIDE 0.9 % (FLUSH) 0.9 %
10 SYRINGE (ML) INJECTION AS NEEDED
Status: DISCONTINUED | OUTPATIENT
Start: 2021-04-08 | End: 2021-04-15 | Stop reason: HOSPADM

## 2021-04-08 RX ORDER — PANTOPRAZOLE SODIUM 40 MG/1
40 TABLET, DELAYED RELEASE ORAL EVERY MORNING
Status: DISCONTINUED | OUTPATIENT
Start: 2021-04-08 | End: 2021-04-14

## 2021-04-08 RX ORDER — DOCUSATE SODIUM 100 MG/1
100 CAPSULE, LIQUID FILLED ORAL NIGHTLY
Status: DISCONTINUED | OUTPATIENT
Start: 2021-04-08 | End: 2021-04-15 | Stop reason: HOSPADM

## 2021-04-08 RX ORDER — CALCIUM CARBONATE 200(500)MG
2 TABLET,CHEWABLE ORAL 2 TIMES DAILY PRN
Status: DISCONTINUED | OUTPATIENT
Start: 2021-04-08 | End: 2021-04-15 | Stop reason: HOSPADM

## 2021-04-08 RX ORDER — SODIUM CHLORIDE 0.9 % (FLUSH) 0.9 %
10 SYRINGE (ML) INJECTION EVERY 12 HOURS SCHEDULED
Status: DISCONTINUED | OUTPATIENT
Start: 2021-04-08 | End: 2021-04-15 | Stop reason: HOSPADM

## 2021-04-08 RX ORDER — WARFARIN SODIUM 5 MG/1
5 TABLET ORAL
Status: DISCONTINUED | OUTPATIENT
Start: 2021-04-08 | End: 2021-04-15 | Stop reason: HOSPADM

## 2021-04-08 RX ORDER — WHEAT DEXTRIN 3 G/4 G
1 POWDER IN PACKET (EA) ORAL DAILY
Status: DISCONTINUED | OUTPATIENT
Start: 2021-04-08 | End: 2021-04-15 | Stop reason: HOSPADM

## 2021-04-08 RX ORDER — CHOLECALCIFEROL (VITAMIN D3) 125 MCG
1000 CAPSULE ORAL DAILY
Status: DISCONTINUED | OUTPATIENT
Start: 2021-04-08 | End: 2021-04-15 | Stop reason: HOSPADM

## 2021-04-08 RX ORDER — MESALAMINE 0.38 G/1
1.5 CAPSULE, EXTENDED RELEASE ORAL DAILY
Status: DISCONTINUED | OUTPATIENT
Start: 2021-04-08 | End: 2021-04-15 | Stop reason: HOSPADM

## 2021-04-08 RX ORDER — ACETAMINOPHEN 325 MG/1
650 TABLET ORAL EVERY 4 HOURS PRN
Status: DISCONTINUED | OUTPATIENT
Start: 2021-04-08 | End: 2021-04-15 | Stop reason: HOSPADM

## 2021-04-08 RX ORDER — ONDANSETRON 2 MG/ML
4 INJECTION INTRAMUSCULAR; INTRAVENOUS EVERY 6 HOURS PRN
Status: DISCONTINUED | OUTPATIENT
Start: 2021-04-08 | End: 2021-04-15 | Stop reason: HOSPADM

## 2021-04-08 RX ORDER — ALBUTEROL SULFATE 90 UG/1
2 AEROSOL, METERED RESPIRATORY (INHALATION) EVERY 6 HOURS PRN
Status: DISCONTINUED | OUTPATIENT
Start: 2021-04-08 | End: 2021-04-15 | Stop reason: HOSPADM

## 2021-04-08 RX ORDER — ACETAMINOPHEN 160 MG/5ML
650 SOLUTION ORAL EVERY 4 HOURS PRN
Status: DISCONTINUED | OUTPATIENT
Start: 2021-04-08 | End: 2021-04-15 | Stop reason: HOSPADM

## 2021-04-08 RX ORDER — WARFARIN SODIUM 5 MG/1
5 TABLET ORAL
Status: DISCONTINUED | OUTPATIENT
Start: 2021-04-08 | End: 2021-04-08

## 2021-04-08 RX ORDER — WARFARIN SODIUM 2.5 MG/1
2.5 TABLET ORAL
Status: DISCONTINUED | OUTPATIENT
Start: 2021-04-09 | End: 2021-04-15 | Stop reason: HOSPADM

## 2021-04-08 RX ORDER — CEFTRIAXONE SODIUM 1 G/50ML
1 INJECTION, SOLUTION INTRAVENOUS EVERY 24 HOURS
Status: DISCONTINUED | OUTPATIENT
Start: 2021-04-09 | End: 2021-04-10

## 2021-04-08 RX ORDER — ONDANSETRON 4 MG/1
4 TABLET, FILM COATED ORAL EVERY 6 HOURS PRN
Status: DISCONTINUED | OUTPATIENT
Start: 2021-04-08 | End: 2021-04-15 | Stop reason: HOSPADM

## 2021-04-08 RX ORDER — ACETAMINOPHEN 650 MG/1
650 SUPPOSITORY RECTAL EVERY 4 HOURS PRN
Status: DISCONTINUED | OUTPATIENT
Start: 2021-04-08 | End: 2021-04-15 | Stop reason: HOSPADM

## 2021-04-08 RX ORDER — CEFTRIAXONE SODIUM 1 G/50ML
1 INJECTION, SOLUTION INTRAVENOUS ONCE
Status: COMPLETED | OUTPATIENT
Start: 2021-04-08 | End: 2021-04-08

## 2021-04-08 RX ADMIN — DOCUSATE SODIUM 100 MG: 100 CAPSULE ORAL at 21:19

## 2021-04-08 RX ADMIN — CEFTRIAXONE SODIUM 1 G: 1 INJECTION, SOLUTION INTRAVENOUS at 13:41

## 2021-04-08 RX ADMIN — PANTOPRAZOLE SODIUM 40 MG: 40 TABLET, DELAYED RELEASE ORAL at 13:41

## 2021-04-08 RX ADMIN — ACETAMINOPHEN 650 MG: 325 TABLET ORAL at 21:19

## 2021-04-08 RX ADMIN — MESALAMINE 1.5 G: 0.38 CAPSULE, EXTENDED RELEASE ORAL at 17:34

## 2021-04-08 RX ADMIN — CALCIUM CARBONATE-VITAMIN D TAB 500 MG-200 UNIT 500 MG: 500-200 TAB at 17:35

## 2021-04-08 RX ADMIN — SODIUM CHLORIDE, PRESERVATIVE FREE 10 ML: 5 INJECTION INTRAVENOUS at 21:19

## 2021-04-08 RX ADMIN — Medication 1 EACH: at 17:35

## 2021-04-08 RX ADMIN — WARFARIN 5 MG: 5 TABLET ORAL at 21:18

## 2021-04-08 RX ADMIN — SODIUM CHLORIDE, PRESERVATIVE FREE 10 ML: 5 INJECTION INTRAVENOUS at 02:02

## 2021-04-08 NOTE — ED PROVIDER NOTES
EMERGENCY DEPARTMENT ENCOUNTER    Room Number:  23/23  PCP: Mega Esparza MD  Historian: Patient  History Limited By: Nothing      HPI  Chief Complaint: Weakness and shortness of breath  Context: Brittany Villeda is a 86 y.o. female who presents to the ED c/o weakness and shortness of breath.  Patient states symptoms started on Saturday with increasing weakness.  Patient states she has been doing physical therapy and has been doing fairly well since Saturday she has been unable to get up.  Patient has had increasing shortness of breath.  Shortness of breath much worse with exertion and better with rest.  Has had no lower extremity swelling.  Has had some mild lightheadedness.  No chest pain.  No cough or congestion      Location: Weakness   Radiation: None  Character: Generalized  Duration: 5 days  Severity: Moderate   Progression: Not improving  Aggravating Factors: Nothing  Alleviating Factors: Nothing        MEDICAL RECORD REVIEW    Patient has history of atrial fibrillation          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   • Leukopenia 09/12/2016   • MGUS (monoclonal gammopathy of unknown significance) 09/16/2016   • 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   • Other chest pain 12/24/2017   • Lichen sclerosus 08/23/2017   • Heart block 10/30/2018   • Sleep-related hypoxia 12/22/2018   • Bradycardia 12/29/2018   • Shoulder arthritis 01/28/2019   • History of atrial fibrillation 03/19/2019   • Pacemaker 03/19/2019   •  Paroxysmal SVT (supraventricular tachycardia) (CMS/HCC) 05/08/2019   • History of chest pain 05/08/2019   • Palpitations 05/08/2019   • Atrial fibrillation, persistent (CMS/HCC) 10/06/2019   • Rectal bleeding 10/15/2019   • Arthritis 12/13/2019   • Ascending aortic aneurysm (CMS/HCC) 08/24/2020   • Mediastinal lymphadenopathy 09/09/2020   • Immobility 11/20/2020   • Left knee pain 11/20/2020   • Chronic anticoagulation 11/20/2020   • Hemarthrosis of left knee 11/20/2020   • Anemia    • Obesity (BMI 30-39.9)      Resolved Ambulatory Problems     Diagnosis Date Noted   • Abnormal weight loss 05/12/2016   • Tachycardia 05/12/2016   • Nausea 05/12/2016   • Hyperthyroidism 05/12/2016   • Fatigue 05/12/2016   • Dizziness 05/12/2016   • Diarrhea 05/12/2016   • Abnormal thyroid stimulating hormone (TSH) level 05/12/2016   • Abdominal pain 05/12/2016   • Abnormal EKG 06/30/2016     Past Medical History:   Diagnosis Date   • Arrhythmia    • Asthma    • Chest pain    • Colitis    • COPD (chronic obstructive pulmonary disease) (CMS/[facility])    • GERD (gastroesophageal reflux disease)    • Hypertension    • Hypertensive heart disease    • Kidney stone    • Low back pain    • Nephrolithiasis    • Obesity    • Paroxysmal atrial fibrillation (CMS/[facility])    • Peptic ulceration    • PSVT (paroxysmal supraventricular tachycardia) (CMS/[facility])    • Rectal bleed    • Sleep apnea    • Ventricular tachycardia (CMS/HCC)    • Vertigo          PAST SURGICAL HISTORY  Past Surgical History:   Procedure Laterality Date   • BACK SURGERY      lumbar fusion   • CARDIAC ELECTROPHYSIOLOGY PROCEDURE N/A 11/7/2018    Procedure: Pacemaker DC new   [place];  Surgeon: Jesus Granda MD;  Location: [facility] INVASIVE LOCATION;  Service: Cardiology   • CHOLECYSTECTOMY     • COLONOSCOPY  06/16/2014    colitis, cryptitis,  tics, NBIH, TA w/low grade dysplasia   • COLONOSCOPY N/A 10/28/2019    Procedure: COLONOSCOPY WITH COLD AND HOT POLYPECTOMIES;  Surgeon:  Micaela English MD;  Location: [facility] ENDOSCOPY;  Service: Gastroenterology   • HEMORRHOIDECTOMY     • HYSTERECTOMY     • KNEE ARTHROPLASTY     • MYOMECTOMY     • SHOULDER SURGERY     • SINUS SURGERY     • TOE NAIL AMPUTATION  2019   • TONSILLECTOMY           FAMILY HISTORY  Family History   Problem Relation Age of Onset   • Diabetes Mother    • Breast cancer Sister    • Kidney cancer Sister    • Heart disease Sister    • Prostate cancer Brother    • Prostate cancer Brother    • Prostate cancer Brother    • Malig Hyperthermia Neg Hx          SOCIAL HISTORY  Social History     Socioeconomic History   • Marital status:      Spouse name: Not on file   • Number of children: 10   • Years of education: High School   • Highest education level: Not on file   Tobacco Use   • Smoking status: Former Smoker     Packs/day: 1.50     Years: 10.00     Pack years: 15.00     Start date:      Quit date:      Years since quittin.3   • Smokeless tobacco: Never Used   • Tobacco comment: QUIT SMOKING    Substance and Sexual Activity   • Alcohol use: No     Comment: Daily caffeine use - one cup of coffee   • Drug use: Defer   • Sexual activity: Defer         ALLERGIES  Codeine, Amitriptyline, Amoxicillin-pot clavulanate, Aspirin, Bactrim [sulfamethoxazole-trimethoprim], Carisoprodol-aspirin-codeine, Iodinated diagnostic agents, Latex, Naproxen, Nsaids, Soma compound with codeine [carisoprodol-aspirin-codeine], Sulfa antibiotics, and Tramadol        REVIEW OF SYSTEMS  Review of Systems   Constitutional: Negative for fever.   HENT: Negative for sore throat.    Eyes: Negative.    Respiratory: Positive for shortness of breath. Negative for cough.    Cardiovascular: Negative for chest pain.   Gastrointestinal: Negative for abdominal pain, diarrhea and vomiting.   Genitourinary: Negative for dysuria.   Musculoskeletal: Negative for neck pain.   Skin: Negative for rash.   Allergic/Immunologic: Negative.     Neurological: Positive for weakness and light-headedness. Negative for numbness and headaches.   Hematological: Negative.    Psychiatric/Behavioral: Negative.    All other systems reviewed and are negative.           PHYSICAL EXAM  ED Triage Vitals [04/07/21 2312]   Temp Heart Rate Resp BP SpO2   99 °F (37.2 °C) 69 18 119/65 98 %      Temp src Heart Rate Source Patient Position BP Location FiO2 (%)   Tympanic -- -- -- --       Physical Exam  Vitals and nursing note reviewed.   Constitutional:       General: She is not in acute distress.  HENT:      Head: Normocephalic and atraumatic.   Eyes:      Pupils: Pupils are equal, round, and reactive to light.   Cardiovascular:      Rate and Rhythm: Normal rate and regular rhythm.      Heart sounds: Normal heart sounds.   Pulmonary:      Effort: Pulmonary effort is normal. No respiratory distress.      Breath sounds: Normal breath sounds.   Abdominal:      Palpations: Abdomen is soft.      Tenderness: There is no abdominal tenderness. There is no guarding or rebound.   Musculoskeletal:         General: Normal range of motion.      Cervical back: Normal range of motion and neck supple.   Skin:     General: Skin is warm and dry.      Findings: No rash.   Neurological:      Mental Status: She is alert and oriented to person, place, and time.      Sensory: Sensation is intact.      Comments: Generalized weakness.  Unable to lift either leg off the bed   Psychiatric:         Mood and Affect: Mood and affect normal.       Patient was wearing a face mask when I entered the room and they continued to wear a mask throughout their stay in the ED.  I wore PPE, including  gloves, face mask with shield or face mask with goggles whenever I was in the room with patient.     LAB RESULTS  Recent Results (from the past 24 hour(s))   ECG 12 Lead    Collection Time: 04/07/21 11:30 PM   Result Value Ref Range    QT Interval 452 ms   Protime-INR    Collection Time: 04/07/21 11:35 PM     Specimen: Blood   Result Value Ref Range    Protime 23.7 (H) 11.7 - 14.2 Seconds    INR 2.15 (H) 0.90 - 1.10   Comprehensive Metabolic Panel    Collection Time: 04/07/21 11:35 PM    Specimen: Blood   Result Value Ref Range    Glucose 108 (H) 65 - 99 mg/dL    BUN 12 8 - 23 mg/dL    Creatinine 0.68 0.57 - 1.00 mg/dL    Sodium 139 136 - 145 mmol/L    Potassium 4.4 3.5 - 5.2 mmol/L    Chloride 106 98 - 107 mmol/L    CO2 24.6 22.0 - 29.0 mmol/L    Calcium 9.7 8.6 - 10.5 mg/dL    Total Protein 6.6 6.0 - 8.5 g/dL    Albumin 3.30 (L) 3.50 - 5.20 g/dL    ALT (SGPT) 9 1 - 33 U/L    AST (SGOT) 16 1 - 32 U/L    Alkaline Phosphatase 93 39 - 117 U/L    Total Bilirubin 0.6 0.0 - 1.2 mg/dL    eGFR  African Amer 100 >60 mL/min/1.73    Globulin 3.3 gm/dL    A/G Ratio 1.0 g/dL    BUN/Creatinine Ratio 17.6 7.0 - 25.0    Anion Gap 8.4 5.0 - 15.0 mmol/L   BNP    Collection Time: 04/07/21 11:35 PM    Specimen: Blood   Result Value Ref Range    proBNP 1,228.0 0.0-1,800.0 pg/mL   Troponin    Collection Time: 04/07/21 11:35 PM    Specimen: Blood   Result Value Ref Range    Troponin T <0.010 0.000 - 0.030 ng/mL   CBC Auto Differential    Collection Time: 04/07/21 11:35 PM    Specimen: Blood   Result Value Ref Range    WBC 3.64 3.40 - 10.80 10*3/mm3    RBC 3.81 3.77 - 5.28 10*6/mm3    Hemoglobin 12.2 12.0 - 15.9 g/dL    Hematocrit 37.7 34.0 - 46.6 %    MCV 99.0 (H) 79.0 - 97.0 fL    MCH 32.0 26.6 - 33.0 pg    MCHC 32.4 31.5 - 35.7 g/dL    RDW 13.1 12.3 - 15.4 %    RDW-SD 46.8 37.0 - 54.0 fl    MPV 9.3 6.0 - 12.0 fL    Platelets 163 140 - 450 10*3/mm3    nRBC 0.0 0.0 - 0.2 /100 WBC   Manual Differential    Collection Time: 04/07/21 11:35 PM    Specimen: Blood   Result Value Ref Range    Neutrophil % 34.5 (L) 42.7 - 76.0 %    Lymphocyte % 52.9 (H) 19.6 - 45.3 %    Monocyte % 10.3 5.0 - 12.0 %    Eosinophil % 1.1 0.3 - 6.2 %    Basophil % 1.1 0.0 - 1.5 %    Neutrophils Absolute 1.26 (L) 1.70 - 7.00 10*3/mm3    Lymphocytes Absolute 1.93 0.70 -  3.10 10*3/mm3    Monocytes Absolute 0.37 0.10 - 0.90 10*3/mm3    Eosinophils Absolute 0.04 0.00 - 0.40 10*3/mm3    Basophils Absolute 0.04 0.00 - 0.20 10*3/mm3    RBC Morphology Normal Normal    Smudge Cells Slight/1+ None Seen    Platelet Morphology Normal Normal       Ordered the above labs and reviewed the results.        RADIOLOGY  XR Chest 1 View   Final Result   No acute findings.       This report was finalized on 4/8/2021 12:12 AM by Dr. Luna Oleary M.D.          CT Head Without Contrast    (Results Pending)        Ordered the above noted radiological studies. Reviewed by me in PACS.          PROCEDURES  Procedures      EKG:          EKG time: 2330  Rhythm/Rate: Paced rhythm rate 70  Interpreted Contemporaneously by me, independently viewed        MEDICATIONS GIVEN IN ER  Medications   sodium chloride 0.9 % flush 10 mL (has no administration in time range)   sodium chloride 0.9 % flush 10 mL (has no administration in time range)   acetaminophen (TYLENOL) tablet 650 mg (has no administration in time range)     Or   acetaminophen (TYLENOL) 160 MG/5ML solution 650 mg (has no administration in time range)     Or   acetaminophen (TYLENOL) suppository 650 mg (has no administration in time range)   ondansetron (ZOFRAN) tablet 4 mg (has no administration in time range)     Or   ondansetron (ZOFRAN) injection 4 mg (has no administration in time range)   calcium carbonate (TUMS) chewable tablet 500 mg (200 mg elemental) (has no administration in time range)             PROGRESS AND CONSULTS  ED Course as of Apr 08 0145   Thu Apr 08, 2021   0135 01:36 EDT  Patient presents for generalized weakness as well as shortness of breath.  Patient states she was able to ambulate around her house a week ago and now cannot lift either leg off the bed.  Does not appear to be focal like a stroke.  Patient has no back pain.  Patient also appears to have dyspnea of unclear etiology.  Patient has no evidence of pneumonia or  congestive heart failure.  Discussed options with patient and she feels she cannot go home.  Discussed with Shwetha who will admit for further eval for [facility].    [SL]      ED Course User Index  [SL] Mark Burnett MD           MEDICAL DECISION MAKING      MDM  Number of Diagnoses or Management Options     Amount and/or Complexity of Data Reviewed  Clinical lab tests: reviewed and ordered (White blood cell count 3)  Tests in the radiology section of CPT®: reviewed and ordered (X-ray negative)  Discuss the patient with other providers: yes (Discussed with Shwetha with [facility] who will admit for Dr. Leonard)               DIAGNOSIS  Final diagnoses:   Generalized weakness   Acute dyspnea           DISPOSITION  admit        Latest Documented Vital Signs:  As of 01:45 EDT  BP- 123/72 HR- 70 Temp- 99 °F (37.2 °C) (Tympanic) O2 sat- 100%                         Mark Burnett MD  04/08/21 0146

## 2021-04-08 NOTE — ED TRIAGE NOTES
Pt to ED from home with complaints of SOB of about a week that increasingly worse today with dizziness. Ems placed pt on 2L pt 95% on RA. Pt has pacemaker. Per ems pt has irregular heart rhythm in route.     I wore full protective equipment throughout this patient encounter including a face mask, goggles, and gloves. Hand hygiene was performed before donning protective equipment and after removal when leaving the room.

## 2021-04-08 NOTE — PROGRESS NOTES
Pts daughter paged after hours (2200) to report that pt had been sick x 4 days with weakness and progressive dyspnea.  Had not contacted PCP until this evening when daughter sent message.     Daughter was advised that mother needs to be seen in ER.  Daughter did not think patient could be taken by family members as she could not stand up.  Daughter was advised that she should call EMS if patient was so ill and short of breath that she could not stand up.  Daughter said she would do this as soon as she hung up the phone.    Will update PCP-SW

## 2021-04-08 NOTE — PLAN OF CARE
Goal Outcome Evaluation:     Progress: no change  Outcome Summary: Patient from ED with weakness of LE. CT and X-ray both negative. Bacteria found in urine. Patient to weak to walk. PT consulted. Patient on external catheter. Patient is V-paced and alert and oriented x4. Patient had dose or rocephin in ED. Will continue to monitor.

## 2021-04-08 NOTE — PROGRESS NOTES
There is not a written consult for neurology to evaluate and treat so I did not see this patient she appears to be neurologically stable at this time.  If a neurology consult is still needed please write a written consult and I will be able to see her in the morning.  Thanks  Jp Daniels MD

## 2021-04-08 NOTE — THERAPY EVALUATION
Patient Name: Brittany Villeda  : 1935    MRN: [id number]                              Today's Date: 2021       Admit Date: 2021    Visit Dx:     ICD-10-CM ICD-9-CM   1. Generalized weakness  R53.1 780.79   2. Acute dyspnea  R06.00 786.09     Patient Active Problem List   Diagnosis   • Sciatica   • Non-toxic multinodular goiter   • Chronic midline low back pain with right-sided sciatica   • Essential hypertension   • Gastroesophageal reflux disease without esophagitis   • Cataract   • Pulmonary hypertension (CMS/HCC)   • Diastolic dysfunction   • HUGO (obstructive sleep apnea)   • Trifascicular block   • Leukopenia   • MGUS (monoclonal gammopathy of unknown significance)   • 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CMS/[facility])   • Other chest pain   • Lichen sclerosus   • Heart block   • Sleep-related hypoxia   • Bradycardia   • Shoulder arthritis   • History of atrial fibrillation   • Pacemaker   • Paroxysmal SVT (supraventricular tachycardia) (CMS/[facility])   • History of chest pain   • Palpitations   • Atrial fibrillation, persistent (CMS/HCC)   • Rectal bleeding   • Arthritis   • Ascending aortic aneurysm (CMS/HCC)   • Mediastinal lymphadenopathy   • Immobility   • Left knee pain   • Chronic anticoagulation   • Hemarthrosis of left knee   • Anemia   • Obesity (BMI 30-39.9)   • Generalized weakness   • Acute UTI (urinary tract infection)     Past Medical History:   Diagnosis Date   • Acute UTI (urinary tract infection) 2021   • Anemia    • Arrhythmia    • Arthritis    • Asthma    • Bradycardia    • Chest pain    • Colitis    • COPD (chronic obstructive pulmonary disease) (CMS/[facility])    • Diastolic dysfunction    • Essential hypertension 2016   • GERD (gastroesophageal reflux disease)    • Heart block    • Hypertension    • Hypertensive heart disease    • Hyperthyroidism    • Kidney stone    • Leukopenia    • Low back pain    • MGUS (monoclonal gammopathy of unknown significance)    •  Nephrolithiasis    • Obesity    • Paroxysmal atrial fibrillation (CMS/HCC)    • Peptic ulceration    • PSVT (paroxysmal supraventricular tachycardia) (CMS/HCC)    • Pulmonary hypertension (CMS/HCC)    • Rectal bleed    • Sleep apnea    • Trifascicular block    • 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)    • Ventricular tachycardia (CMS/HCC)     nonsustained   • Vertigo      Past Surgical History:   Procedure Laterality Date   • BACK SURGERY      lumbar fusion   • CARDIAC ELECTROPHYSIOLOGY PROCEDURE N/A 11/7/2018    Procedure: Pacemaker DC new   [place];  Surgeon: Jesus Granda MD;  Location: [facility] CATH INVASIVE LOCATION;  Service: Cardiology   • CHOLECYSTECTOMY     • COLONOSCOPY  06/16/2014    colitis, cryptitis,  tics, NBIH, TA w/low grade dysplasia   • COLONOSCOPY N/A 10/28/2019    Procedure: COLONOSCOPY WITH COLD AND HOT POLYPECTOMIES;  Surgeon: Micaela English MD;  Location: [facility] ENDOSCOPY;  Service: Gastroenterology   • HEMORRHOIDECTOMY     • HYSTERECTOMY     • KNEE ARTHROPLASTY     • MYOMECTOMY     • SHOULDER SURGERY     • SINUS SURGERY     • TOE NAIL AMPUTATION  03/04/2019   • TONSILLECTOMY       General Information     Row Name 04/08/21 1609          Physical Therapy Time and Intention    Document Type  evaluation  -CB     Mode of Treatment  individual therapy;physical therapy  -CB     Row Name 04/08/21 160          General Information    Patient Profile Reviewed  yes  -CB     Prior Level of Function  independent: Per patient she was hospitalized Nov 2020 and prior to Nov she was independent with all ALDs and mobility with walker. Since Nov she has had homehealth PT and ambualted short distances with them but still unable to bathe self.  -CB     Existing Precautions/Restrictions  fall;oxygen therapy device and L/min;pacemaker  -CB     Barriers to Rehab  medically complex;previous functional deficit  -CB     Row Name 04/08/21 1561          Living Environment    Lives With   child(donaldo), adult  -CB     Row Name 04/08/21 1609          Home Main Entrance    Number of Stairs, Main Entrance  -- ramp  -CB     Row Name 04/08/21 1609          Stairs Within Home, Primary    Number of Stairs, Within Home, Primary  none  -CB     Row Name 04/08/21 1609          Cognition    Orientation Status (Cognition)  oriented x 4  -CB     Row Name 04/08/21 1609          Safety Issues, Functional Mobility    Impairments Affecting Function (Mobility)  balance;endurance/activity tolerance;pain;strength;shortness of breath;range of motion (ROM);postural/trunk control  -CB     Comment, Safety Issues/Impairments (Mobility)  non skid socks  -CB       User Key  (r) = Recorded By, (t) = Taken By, (c) = Cosigned By    Initials Name Provider Type    Luzma Estrada Physical Therapist        Mobility     Row Name 04/08/21 1613          Bed Mobility    Bed Mobility  supine-sit;sit-supine  -CB     Supine-Sit [place] (Bed Mobility)  maximum assist (25% patient effort)  -CB     Sit-Supine [place] (Bed Mobility)  maximum assist (25% patient effort)  -CB     Assistive Device (Bed Mobility)  head of bed elevated;leg ;bed rails  -CB     Comment (Bed Mobility)  pt with mod to severe groin pain with BLE movement to get EOB. Pain subsided then 2nd attemp patient able to complete  -CB     Row Name 04/08/21 1613          Bed-Chair Transfer    Bed-Chair [place] (Transfers)  not tested  -CB     Row Name 04/08/21 1613          Sit-Stand Transfer    Sit-Stand [place] (Transfers)  not tested  -CB     Row Name 04/08/21 1613          Gait/Stairs (Locomotion)    [place]io Level (Gait)  not tested  -CB       User Key  (r) = Recorded By, (t) = Taken By, (c) = Cosigned By    Initials Name Provider Type    Luzma Estrada Physical Therapist        Obj/Interventions     Row Name 04/08/21 1615          Range of Motion Comprehensive    General Range of Motion  bilateral lower extremity ROM WFL  -CB     Row Name  04/08/21 1615          Strength Comprehensive (MMT)    Comment, General Manual Muscle Testing (MMT) Assessment  generalized weakness BLE grossly 3/5  -CB     Row Name 04/08/21 1615          Motor Skills    Therapeutic Exercise  -- supine DF/PF, HS, SLR- modA for SLR  -CB     Row Name 04/08/21 1615          Balance    Balance Assessment  sitting static balance;sitting dynamic balance  -CB     Static Sitting Balance  mild impairment  -CB     Dynamic Sitting Balance  mild impairment  -CB     Row Name 04/08/21 1615          Sensory Assessment (Somatosensory)    Sensory Assessment (Somatosensory)  LE sensation intact  -CB       User Key  (r) = Recorded By, (t) = Taken By, (c) = Cosigned By    Initials Name Provider Type    Luzma Estrada Physical Therapist        Goals/Plan     Row Name 04/08/21 1627          Bed Mobility Goal 1 (PT)    Activity/Assistive Device (Bed Mobility Goal 1, PT)  bed mobility activities, all  -CB     Bent Level/Cues Needed (Bed Mobility Goal 1, PT)  contact guard assist  -CB     Time Frame (Bed Mobility Goal 1, PT)  long term goal (LTG);1 week  -CB     Row Name 04/08/21 1627          Transfer Goal 1 (PT)    Activity/Assistive Device (Transfer Goal 1, PT)  transfers, all;walker, rolling  -CB     Bent Level/Cues Needed (Transfer Goal 1, PT)  contact guard assist  -CB     Time Frame (Transfer Goal 1, PT)  long term goal (LTG);1 week  -CB     Row Name 04/08/21 1627          Gait Training Goal 1 (PT)    Activity/Assistive Device (Gait Training Goal 1, PT)  gait (walking locomotion)  -CB     Bent Level (Gait Training Goal 1, PT)  minimum assist (75% or more patient effort)  -CB     Distance (Gait Training Goal 1, PT)  25ft  -CB     Time Frame (Gait Training Goal 1, PT)  long term goal (LTG);1 week  -CB       User Key  (r) = Recorded By, (t) = Taken By, (c) = Cosigned By    Initials Name Provider Type    Luzma Estrada Physical Therapist        Clinical Impression     Row  Name 04/08/21 1616          Pain    Additional Documentation  Pain Scale: Numbers Pre/Post-Treatment (Group);Pain Scale: FACES Pre/Post-Treatment (Group)  -CB     Row Name 04/08/21 1616          Pain Scale: FACES Pre/Post-Treatment    Pain: FACES Scale, Pretreatment  0-->no hurt  -CB     Posttreatment Pain Rating  6-->hurts even more  -CB     Pre/Posttreatment Pain Comment  BLE when moving EOB in B groin then only LLE groin only with movement in bed.  -CB     Row Name 04/08/21 1616          Plan of Care Review    Plan of Care Reviewed With  patient;daughter  -CB     Progress  no change  -CB     Outcome Summary  Patient is a 85 yo female who presented with weakness and SOA. She was found to have a UTI and CT head negative. She states prior to hospitalization in Nov she was independent with all ALDs and mobility with walker but since Nov she has only been walking with home health PT and has all equipment needs. She has ramp to enter home and adult son lives with her. The patient has not completed standing since 4/5/2021.  The patient presents today with decreased strength, balance, activity tolerance, and increased pain. The patient completed supine<>sitting EOB with maxA on second attempt due to pain in B groin with intial trial of sitting and pain subsided. Pt reports dizziness in sitting and did not subside so patient returned to supine. The patient completed bed exercises and reported L groin pain wtih SLR so discontinued. The patient will likely benefit from skilled PT to increase level of indpendence.  Patient and daughter at bedside not agreeable to SNF recommendation from PT and wish to return home with home health PT.  -CB     Row Name 04/08/21 1616          Therapy Assessment/Plan (PT)    Patient/Family Therapy Goals Statement (PT)  get stronger  -CB     Rehab Potential (PT)  fair, will monitor progress closely  -CB     Criteria for Skilled Interventions Met (PT)  yes  -CB     Row Name 04/08/21 1611           Positioning and Restraints    Pre-Treatment Position  in bed  -CB     Post Treatment Position  bed  -CB     In Bed  notified nsg;fowlers;call light within reach;encouraged to call for assist;exit alarm on;with family/caregiver  -CB       User Key  (r) = Recorded By, (t) = Taken By, (c) = Cosigned By    Initials Name Provider Type    Luzma Estrada Physical Therapist        Outcome Measures     Row Name 04/08/21 1628          How much help from another person do you currently need...    Turning from your back to your side while in flat bed without using bedrails?  2  -CB     Moving from lying on back to sitting on the side of a flat bed without bedrails?  1  -CB     Moving to and from a bed to a chair (including a wheelchair)?  1  -CB     Standing up from a chair using your arms (e.g., wheelchair, bedside chair)?  1  -CB     Climbing 3-5 steps with a railing?  1  -CB     To walk in hospital room?  1  -CB     AM-PAC 6 Clicks Score (PT)  7  -CB     Row Name 04/08/21 1628          Functional Assessment    Outcome Measure Options  AM-PAC 6 Clicks Basic Mobility (PT)  -CB       User Key  (r) = Recorded By, (t) = Taken By, (c) = Cosigned By    Initials Name Provider Type    Luzma Estrada Physical Therapist        Physical Therapy Education                 Title: PT OT SLP Therapies (In Progress)     Topic: Physical Therapy (Done)     Point: Mobility training (Done)     Learning Progress Summary           Patient Acceptance, E,TB,D, VU,NR by  at 4/8/2021 1628                   Point: Home exercise program (Done)     Learning Progress Summary           Patient Acceptance, E,TB,D, VU,NR by CB at 4/8/2021 1628                   Point: Body mechanics (Done)     Learning Progress Summary           Patient Acceptance, E,TB,D, VU,NR by CB at 4/8/2021 1628                   Point: Precautions (Done)     Learning Progress Summary           Patient Acceptance, E,TB,D, VU,NR by  at 4/8/2021 1628                                User Key     Initials Effective Dates Name Provider Type Discipline     12/30/20 -  Luzma Ramírez Physical Therapist PT              PT Recommendation and Plan  Planned Therapy Interventions (PT): balance training, gait training, bed mobility training, home exercise program, patient/family education, transfer training, stretching, strengthening, stair training, ROM (range of motion)  Plan of Care Reviewed With: patient, daughter  Progress: no change  Outcome Summary: Patient is a 85 yo female who presented with weakness and SOA. She was found to have a UTI and CT head negative. She states prior to hospitalization in Nov she was independent with all ALDs and mobility with walker but since Nov she has only been walking with home health PT and has all equipment needs. She has ramp to enter home and adult son lives with her. The patient has not completed standing since 4/5/2021.  The patient presents today with decreased strength, balance, activity tolerance, and increased pain. The patient completed supine<>sitting EOB with maxA on second attempt due to pain in B groin with intial trial of sitting and pain subsided. Pt reports dizziness in sitting and did not subside so patient returned to supine. The patient completed bed exercises and reported L groin pain wtih SLR so discontinued. The patient will likely benefit from skilled PT to increase level of indpendence.  Patient and daughter at bedside not agreeable to SNF recommendation from PT and wish to return home with home health PT.     Time Calculation:   PT Charges     Row Name 04/08/21 1630             Time Calculation    Start Time  1536  -CB      Stop Time  1600  -CB      Time Calculation (min)  24 min  -CB      PT Received On  04/08/21  -CB      PT - Next Appointment  04/09/21  -CB      PT Goal Re-Cert Due Date  04/15/21  -CB         Time Calculation- PT    Total Timed Code Minutes- PT  15 minute(s)  -CB        User Key  (r) = Recorded By, (t) = Taken By,  (c) = Cosigned By    Initials Name Provider Type    Luzma Estrada Physical Therapist        Therapy Charges for Today     Code Description Service Date Service Provider Modifiers Qty    06755592646 HC PT EVAL MOD COMPLEXITY 2 4/8/2021 Luzma Ramírez GP 1    62553867580 HC PT THER PROC EA 15 MIN 4/8/2021 Luzma Ramírez GP 1          PT G-Codes  Outcome Measure Options: AM-PAC 6 Clicks Basic Mobility (PT)  AM-PAC 6 Clicks Score (PT): 7    Luzma Ramírez  4/8/2021

## 2021-04-08 NOTE — PLAN OF CARE
Goal Outcome Evaluation:  Plan of Care Reviewed With: patient, daughter  Progress: no change  Outcome Summary: Patient is a 85 yo female who presented with weakness and SOA. She was found to have a UTI and CT head negative. She states prior to hospitalization in Nov she was independent with all ALDs and mobility with walker but since Nov she has only been walking with home health PT and has all equipment needs. She has ramp to enter home and adult son lives with her. The patient has not completed standing since 4/5/2021.  The patient presents today with decreased strength, balance, activity tolerance, and increased pain. The patient completed supine<>sitting EOB with maxA on second attempt due to pain in B groin with intial trial of sitting and pain subsided. Pt reports dizziness in sitting and did not subside so patient returned to supine. The patient completed bed exercises and reported L groin pain wtih SLR so discontinued. The patient will likely benefit from skilled PT to increase level of indpendence.  Patient and daughter at bedside not agreeable to SNF recommendation from PT and wish to return home with home health PT.Patient was not wearing a face mask during this therapy encounter. Therapist used appropriate personal protective equipment including eye protection, mask, and gloves.  Mask used was standard procedure mask. Appropriate PPE was worn during the entire therapy session. Hand hygiene was completed before and after therapy session. Patient is not in enhanced droplet precautions.

## 2021-04-08 NOTE — H&P
Patient Name:  Brittany Villeda  YOB: 1935  MRN:  [id number]  Admit Date:  4/7/2021  Patient Care Team:  Mega Esparza MD as PCP - General (Family Medicine)  Micaela English MD as Consulting Physician (Gastroenterology)  Mary Grace Culp MD as Consulting Physician (Cardiology)  Jp Krause Jr., MD as Consulting Physician (Hematology and Oncology)  Yasmeen Pichardo [facility] as Pharmacist  Micaela English MD as Referring Physician (Gastroenterology)  Devon Valadez MD as Consulting Physician (Hematology and Oncology)  Rusty Interiano RP as Pharmacist (Pharmacy)      Subjective   History Present Illness     Chief Complaint   Patient presents with   • Shortness of Breath     History of Present Illness   Ms. Villeda is a 86 y.o. former smoker with a history of UC, MGUS, pulmonary HTN, HUGO, obesity, immobility, atrial fibrillation on warfarin, diastolic dysfunction and HTN that presents to [facility] complaining of shortness of breath and weakness. The patient states she has been progressively weaker since November of last year. She was at this facility at that time where she reported left ankle and left knee pain after a fall. She was found to have a hemarthrosis and coumadin was temporarily held. She was recommended to go to rehab but went home instead. She has been working with PT at home since that time.   The patient states prior to November she was very active and still cleaning her house. Since that last stay, she has been less mobile. She recently just started walking with a walker for short distances with PT. However, last Saturday she had an acute change and could not longer walk. She states she is primarily in a chair and cannot even get up to use the restroom. She has been going to the restroom in a brief and family cleaning her up. She states her left leg may be weaker than her right, but denies any prior stroke. She denies any prior issues with neuropathy and/or spine  issues. However, per the chart she does have a history of chronic low back pain. She denies any numbness or tingling as well as any changes in her speech, sensation or vision. She denies any nausea, vomiting or diarrhea. However, she does report some short-lived diarrhea about a week ago. She denies any chest pain, but has been increasingly short of breath with dizziness for the last week as well. She denies any fever or chills. She is not coughing. She denies any dysuria, frequency or urgency, but has been going in her brief as above.   In the ED, lab work showed a WBC of 3.64, proBNP of 1228 and negative troponin. Renal function unremarkable. INR 2.15 on warfarin for a history of afib. UA showed 31-50 WBC with 4+ bacteria and nitrite positive. COVID pending. Procal 0.05. CXR negative acute as well as CT head. She has been admitted for further work up at this time.    Review of Systems   Constitutional: Positive for activity change. Negative for appetite change, chills, fatigue and fever.   HENT: Negative for congestion and ear pain.    Eyes: Negative for pain and discharge.   Respiratory: Positive for shortness of breath. Negative for cough, choking and chest tightness.    Gastrointestinal: Negative for abdominal distention, abdominal pain, constipation, nausea and vomiting.   Genitourinary: Negative for difficulty urinating and dysuria.   Musculoskeletal: Positive for arthralgias (bilateral lower legs ache) and gait problem. Negative for back pain.   Skin: Negative for color change and pallor.   Neurological: Positive for weakness. Negative for dizziness and light-headedness.   Psychiatric/Behavioral: Negative for confusion. The patient is not nervous/anxious.       Personal History     Past Medical History:   Diagnosis Date   • Anemia    • Arrhythmia    • Arthritis    • Asthma    • Bradycardia    • Chest pain    • Colitis    • COPD (chronic obstructive pulmonary disease) (CMS/[facility])    • Diastolic dysfunction    •  Essential hypertension 2016   • GERD (gastroesophageal reflux disease)    • Heart block    • Hypertension    • Hypertensive heart disease    • Hyperthyroidism    • Kidney stone    • Leukopenia    • Low back pain    • MGUS (monoclonal gammopathy of unknown significance)    • Nephrolithiasis    • Obesity    • Paroxysmal atrial fibrillation (CMS/HCC)    • Peptic ulceration    • PSVT (paroxysmal supraventricular tachycardia) (CMS/HCC)    • Pulmonary hypertension (CMS/HCC)    • Rectal bleed    • Sleep apnea    • Trifascicular block    • 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)    • Ventricular tachycardia (CMS/HCC)     nonsustained   • Vertigo      Past Surgical History:   Procedure Laterality Date   • BACK SURGERY      lumbar fusion   • CARDIAC ELECTROPHYSIOLOGY PROCEDURE N/A 2018    Procedure: Pacemaker DC new   [place];  Surgeon: Jesus Granda MD;  Location: [facility] CATH INVASIVE LOCATION;  Service: Cardiology   • CHOLECYSTECTOMY     • COLONOSCOPY  2014    colitis, cryptitis,  tics, NBIH, TA w/low grade dysplasia   • COLONOSCOPY N/A 10/28/2019    Procedure: COLONOSCOPY WITH COLD AND HOT POLYPECTOMIES;  Surgeon: Micaela English MD;  [facility];  Service: Gastroenterology   • HEMORRHOIDECTOMY     • HYSTERECTOMY     • KNEE ARTHROPLASTY     • MYOMECTOMY     • SHOULDER SURGERY     • SINUS SURGERY     • TOE NAIL AMPUTATION  2019   • TONSILLECTOMY       Family History   Problem Relation Age of Onset   • Diabetes Mother    • Breast cancer Sister    • Kidney cancer Sister    • Heart disease Sister    • Prostate cancer Brother    • Prostate cancer Brother    • Prostate cancer Brother    • Malig Hyperthermia Neg Hx      Social History     Tobacco Use   • Smoking status: Former Smoker     Packs/day: 1.50     Years: 10.00     Pack years: 15.00     Start date:      Quit date:      Years since quittin.3   • Smokeless tobacco: Never Used   • Tobacco  comment: QUIT SMOKING 1965   Substance Use Topics   • Alcohol use: No     Comment: Daily caffeine use - one cup of coffee   • Drug use: Defer     (Not in a hospital admission)    Allergies:    Allergies   Allergen Reactions   • Codeine Hallucinations   • Amitriptyline Rash   • Amoxicillin-Pot Clavulanate Rash   • Aspirin Unknown - Low Severity     Patient doesn't know why   • Bactrim [Sulfamethoxazole-Trimethoprim] Rash   • Carisoprodol-Aspirin-Codeine Palpitations   • Iodinated Diagnostic Agents Rash   • Latex Rash   • Naproxen Rash   • Nsaids Unknown - Low Severity     unkknown   • Soma Compound With Codeine [Carisoprodol-Aspirin-Codeine] Rash   • Sulfa Antibiotics Rash   • Tramadol Palpitations     heart races        Objective    Objective     Vital Signs  Temp:  [97.7 °F (36.5 °C)-99 °F (37.2 °C)] 97.7 °F (36.5 °C)  Heart Rate:  [69-70] 70  Resp:  [16-18] 16  BP: (119-157)/(65-90) 156/85  SpO2:  [98 %-100 %] 100 %  on  Flow (L/min):  [2] 2;   Device (Oxygen Therapy): nasal cannula  Body mass index is 36.9 kg/m².    Physical Exam  Vitals and nursing note reviewed.   Constitutional:       General: She is not in acute distress.     Appearance: She is obese. She is not toxic-appearing.   HENT:      Head: Normocephalic and atraumatic.      Right Ear: External ear normal.      Left Ear: External ear normal.      Nose: Nose normal.   Eyes:      General:         Right eye: No discharge.         Left eye: No discharge.      Conjunctiva/sclera: Conjunctivae normal.   Cardiovascular:      Rate and Rhythm: Normal rate. Rhythm irregular.      Pulses: Normal pulses.      Heart sounds: Normal heart sounds.   Pulmonary:      Effort: Pulmonary effort is normal. No respiratory distress.      Breath sounds: Normal breath sounds.   Abdominal:      General: Bowel sounds are normal. There is no distension.      Palpations: Abdomen is soft.      Tenderness: There is no abdominal tenderness.   Musculoskeletal:         General: Swelling  (trace BLE) present. Normal range of motion.      Cervical back: Normal range of motion and neck supple.   Skin:     General: Skin is warm and dry.      Findings: No bruising.   Neurological:      Mental Status: She is alert and oriented to person, place, and time.      Sensory: No sensory deficit.      Motor: Weakness (generalized ) present.      Coordination: Coordination normal.   Psychiatric:         Mood and Affect: Mood normal.         Behavior: Behavior normal.        Results Review:  I reviewed the patient's new clinical results.  I reviewed the patient's new imaging results and agree with the interpretation.  I reviewed the patient's other test results and agree with the interpretation  I personally viewed and interpreted the patient's EKG/Telemetry data  Discussed with ED provider.    Lab Results (last 24 hours)     Procedure Component Value Units Date/Time    Protime-INR [[id number]]  (Abnormal) Collected: 04/07/21 2335    Specimen: Blood Updated: 04/07/21 2353     Protime 23.7 Seconds      INR 2.15    CBC & Differential [[id number]]  (Abnormal) Collected: 04/07/21 2335    Specimen: Blood Updated: 04/07/21 2342    Narrative:      The following orders were created for panel order CBC & Differential.  Procedure                               Abnormality         Status                     ---------                               -----------         ------                     CBC Auto Differential[[id number]]        Abnormal            Final result                 Please view results for these tests on the individual orders.    Comprehensive Metabolic Panel [[id number]]  (Abnormal) Collected: 04/07/21 2335    Specimen: Blood Updated: 04/08/21 0005     Glucose 108 mg/dL      BUN 12 mg/dL      Creatinine 0.68 mg/dL      Sodium 139 mmol/L      Potassium 4.4 mmol/L      Chloride 106 mmol/L      CO2 24.6 mmol/L      Calcium 9.7 mg/dL      Total Protein 6.6 g/dL      Albumin 3.30 g/dL      ALT (SGPT) 9 U/L      AST  (SGOT) 16 U/L      Alkaline Phosphatase 93 U/L      Total Bilirubin 0.6 mg/dL      eGFR  African Amer 100 mL/min/1.73      Globulin 3.3 gm/dL      A/G Ratio 1.0 g/dL      BUN/Creatinine Ratio 17.6     Anion Gap 8.4 mmol/L     Narrative:      GFR Normal >60  Chronic Kidney Disease <60  Kidney Failure <15      BNP [[id number]]  (Normal) Collected: 04/07/21 2335    Specimen: Blood Updated: 04/08/21 0004     proBNP 1,228.0 pg/mL     Narrative:      Among patients with dyspnea, NT-proBNP is highly sensitive for the detection of acute congestive heart failure. In addition NT-proBNP of <300 pg/ml effectively rules out acute congestive heart failure with 99% negative predictive value.    Results may be falsely decreased if patient taking Biotin.      Troponin [[id number]]  (Normal) Collected: 04/07/21 2335    Specimen: Blood Updated: 04/08/21 0005     Troponin T <0.010 ng/mL     Narrative:      Troponin T Reference Range:  <= 0.03 ng/mL-   Negative for AMI  >0.03 ng/mL-     Abnormal for myocardial necrosis.  Clinicians would have to utilize clinical acumen, EKG, Troponin and serial changes to determine if it is an Acute Myocardial Infarction or myocardial injury due to an underlying chronic condition.       Results may be falsely decreased if patient taking Biotin.      CBC Auto Differential [[id number]]  (Abnormal) Collected: 04/07/21 2335    Specimen: Blood Updated: 04/07/21 2342     WBC 3.64 10*3/mm3      RBC 3.81 10*6/mm3      Hemoglobin 12.2 g/dL      Hematocrit 37.7 %      MCV 99.0 fL      MCH 32.0 pg      MCHC 32.4 g/dL      RDW 13.1 %      RDW-SD 46.8 fl      MPV 9.3 fL      Platelets 163 10*3/mm3      nRBC 0.0 /100 WBC     Manual Differential [[id number]]  (Abnormal) Collected: 04/07/21 2335    Specimen: Blood Updated: 04/08/21 0008     Neutrophil % 34.5 %      Lymphocyte % 52.9 %      Monocyte % 10.3 %      Eosinophil % 1.1 %      Basophil % 1.1 %      Neutrophils Absolute 1.26 10*3/mm3      Lymphocytes Absolute 1.93  10*3/mm3      Monocytes Absolute 0.37 10*3/mm3      Eosinophils Absolute 0.04 10*3/mm3      Basophils Absolute 0.04 10*3/mm3      RBC Morphology Normal     Smudge Cells Slight/1+     Platelet Morphology Normal    Urinalysis With Microscopic If Indicated (No Culture) - Urine, Catheter [[id number]]  (Abnormal) Collected: 04/08/21 0107    Specimen: Urine, Catheter Updated: 04/08/21 0206     Color, UA Yellow     Appearance, UA Clear     pH, UA <=5.0     Specific Gravity, UA 1.017     Glucose, UA Negative     Ketones, UA Negative     Bilirubin, UA Negative     Blood, UA Negative     Protein, UA Negative     Leuk Esterase, UA Moderate (2+)     Nitrite, UA Positive     Urobilinogen, UA 0.2 E.U./dL    Urinalysis, Microscopic Only - Urine, Catheter [[id number]]  (Abnormal) Collected: 04/08/21 0107    Specimen: Urine, Catheter Updated: 04/08/21 0206     RBC, UA 0-2 /HPF      WBC, UA 31-50 /HPF      Bacteria, UA 4+ /HPF      Squamous Epithelial Cells, UA 0-2 /HPF      Hyaline Casts, UA 3-6 /LPF      Methodology Automated Microscopy    Urine Culture - Urine, Urine, Catheter [[id number]] Collected: 04/08/21 0107    Specimen: Urine, Catheter Updated: 04/08/21 1020    COVID PRE-OP / PRE-PROCEDURE SCREENING ORDER (NO ISOLATION) - Swab, Nasopharynx [[id number]] Collected: 04/08/21 0139    Specimen: Swab from Nasopharynx Updated: 04/08/21 0149    Narrative:      The following orders were created for panel order COVID PRE-OP / PRE-PROCEDURE SCREENING ORDER (NO ISOLATION) - Swab, Nasopharynx.  Procedure                               Abnormality         Status                     ---------                               -----------         ------                     COVID-19,APTIMA PANTHER,...[[id number]]                      In process                   Please view results for these tests on the individual orders.    COVID-19,APTIMA PANTHERANJU IN-HOUSE, NP/OP SWAB IN UTM/VTM/SALINE TRANSPORT MEDIA,24 HR TAT - Swab, Nasopharynx [[id number]]  "Collected: 04/08/21 0139    Specimen: Swab from Nasopharynx Updated: 04/08/21 0149    Basic Metabolic Panel [[id number]]  (Abnormal) Collected: 04/08/21 0548    Specimen: Blood Updated: 04/08/21 0659     Glucose 76 mg/dL      BUN 9 mg/dL      Creatinine 0.52 mg/dL      Sodium 140 mmol/L      Potassium 4.7 mmol/L      Comment: Specimen hemolyzed.  Results may be affected.        Chloride 112 mmol/L      CO2 21.0 mmol/L      Calcium 8.4 mg/dL      eGFR  African Amer 136 mL/min/1.73      BUN/Creatinine Ratio 17.3     Anion Gap 7.0 mmol/L     Narrative:      GFR Normal >60  Chronic Kidney Disease <60  Kidney Failure <15      Procalcitonin [[id number]]  (Normal) Collected: 04/08/21 0548    Specimen: Blood Updated: 04/08/21 1050     Procalcitonin 0.05 ng/mL     Narrative:      As a Marker for Sepsis (Non-Neonates):     1. <0.5 ng/mL represents a low risk of severe sepsis and/or septic shock.  2. >2 ng/mL represents a high risk of severe sepsis and/or septic shock.    As a Marker for Lower Respiratory Tract Infections that require antibiotic therapy:  PCT on Admission     Antibiotic Therapy             6-12 Hrs later  >0.5                          Strongly Recommended            >0.25 - <0.5             Recommended  0.1 - 0.25                  Discouraged                       Remeasure/reassess PCT  <0.1                         Strongly Discouraged         Remeasure/reassess PCT      As 28 day mortality risk marker: \"Change in Procalcitonin Result\" (>80% or <=80%) if Day 0 (or Day 1) and Day 4 values are available. Refer to http://www.[employer]-pct-calculator.com/    Change in PCT <=80 %   A decrease of PCT levels below or equal to 80% defines a positive change in PCT test result representing a higher risk for 28-day all-cause mortality of patients diagnosed with severe sepsis or septic shock.    Change in PCT >80 %   A decrease of PCT levels of more than 80% defines a negative change in PCT result representing a lower risk " for 28-day all-cause mortality of patients diagnosed with severe sepsis or septic shock.              Results may be falsely decreased if patient taking Biotin.     CBC (No Diff) [[id number]]  (Abnormal) Collected: 04/08/21 0912    Specimen: Blood Updated: 04/08/21 0926     WBC 3.01 10*3/mm3      RBC 3.95 10*6/mm3      Hemoglobin 13.2 g/dL      Hematocrit 38.5 %      MCV 97.5 fL      MCH 33.4 pg      MCHC 34.3 g/dL      RDW 12.7 %      RDW-SD 44.9 fl      MPV 9.1 fL      Platelets 142 10*3/mm3           Imaging Results (Last 24 Hours)     Procedure Component Value Units Date/Time    CT Head Without Contrast [[id number]] Collected: 04/08/21 0331     Updated: 04/08/21 0337    Narrative:      CT HEAD WITHOUT CONTRAST     HISTORY: Weakness     COMPARISON: 01/26/2020     TECHNIQUE: Axial CT imaging was obtained through the brain. No IV  contrast was administered.     FINDINGS:  No acute intracranial hemorrhage is seen. There is mild atrophy. There  is periventricular and deep white matter microangiopathic change. There  is no midline shift or mass effect.     Bilateral basal ganglia calcifications are seen. The paranasal sinuses  and mastoid air cells appear clear.       Impression:      No acute intracranial findings.     Radiation dose reduction techniques were utilized, including automated  exposure control and exposure modulation based on body size.     This report was finalized on 4/8/2021 3:34 AM by Dr. Luna Oleary M.D.       XR Chest 1 View [[id number]] Collected: 04/08/21 0011     Updated: 04/08/21 0015    Narrative:      SINGLE VIEW OF THE CHEST     HISTORY: Shortness of air     COMPARISON: 11/27/2020     FINDINGS:  Cardiomegaly is present. There is no vascular congestion. There is  stable elevation of the right hemidiaphragm. Left-sided pacemaker is  present. No pneumothorax or pleural effusion is seen. No acute  infiltrates are identified.       Impression:      No acute findings.     This report was  finalized on 4/8/2021 12:12 AM by Dr. Luna Oleary M.D.             Results for orders placed during the hospital encounter of 08/02/19    Adult Transthoracic Echo Limited W/ Cont if Necessary Per Protocol    Interpretation Summary  · A limited two-dimensional transthoracic echocardiogram was performed  · Left ventricular systolic function is normal. Calculated EF = 56%. Estimated EF was in agreement with the calculated EF. Normal left ventricular cavity size noted. Left ventricular wall thickness is consistent with mild concentric hypertrophy. Septal wall motion is abnormal, consistent with right ventricular pacing. Left ventricular diastolic function not assessed on this study.      ECG 12 Lead   Final Result   HEART RATE= 70  bpm   RR Interval= 856  ms   MS Interval= 203  ms   P Horizontal Axis= 198  deg   P Front Axis= 0  deg   QRSD Interval= 175  ms   QT Interval= 452  ms   QRS Axis= -59  deg   T Wave Axis= 110  deg   - ABNORMAL ECG -   Sinus rhythm   No change from prior tracing   ventricular pacing   Electronically Signed By: Genaro LeblancAUDELIA) ([facility]) 08-Apr-2021 09:33:18   Date and Time of Study: 2021-04-07 23:30:27           Assessment/Plan     Active Hospital Problems    Diagnosis  POA   • **Generalized weakness [R53.1]  Yes   • Chronic anticoagulation [Z79.01]  Not Applicable   • Immobility [Z74.09]  Yes   • Obesity (BMI 30-39.9) [E66.9]  Yes   • Atrial fibrillation, persistent (CMS/HCC) [I48.19]  Yes   • 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 Villeda is a 86 year old female who presented to the hospital with complaints of weakness and shortness of breath.    · Generalized weakness/immobility: Suspect this is  multifactorial. CT head was negative and she is has no gross neurological deficits. May have some component of neuropathy as she reports her legs do ache. UA does show pyuria and bacteria. She has been incontinent so this is likely contributing. Will treat UTI with Rocephin x 1 today. Await culture. Ask PT/OT to see. Consider neurology consultation. Patient does have a PPM so not MRI candidate.  · Acute UTI: Cover with Rocephin and await culture as above.  · Atrial fibrillation/chronic A/C: Resume warfarin. Monitor PT/INR. Rate controlled.   · Diastolic dysfunction/pulmonary HTN: BNP normal and CXR without congestion. She does not appear overtly overloaded. Will check echocardiogram to rule out any acute findings given weakness, dizziness and dyspnea. Will consult Cardiology if new findings. She sees Dr. Culp.  · HTN: BP stable. Does not appear to have any agents for BP.  · HUGO: Wean oxygen with sleep to maintain saturations > 90%.  · UC: Resume home medications. Monitor stools.     · I discussed the patients findings and my recommendations with patient and nursing staff.    VTE Prophylaxis - Warfarin (home med).  Code Status - No CPR. Discussed with patient at bedside.       SHAHID Nowak  [facility]  04/08/21  12:35 EDT

## 2021-04-08 NOTE — PROGRESS NOTES
Pharmacy Consult: Warfarin Dosing/ Monitoring    Brittany Villeda is a 86 y.o. female, estimated creatinine clearance is 57.2 mL/min (A) (by C-G formula based on SCr of 0.52 mg/dL (L)). weighing 97.5 kg (215 lb).     has a past medical history of Acute UTI (urinary tract infection) (2021), Anemia, Arrhythmia, Arthritis, Asthma, Bradycardia, Chest pain, Colitis, COPD (chronic obstructive pulmonary disease) (CMS/HCC), Diastolic dysfunction, Essential hypertension (2016), GERD (gastroesophageal reflux disease), Heart block, Hypertension, Hypertensive heart disease, Hyperthyroidism, Kidney stone, Leukopenia, Low back pain, MGUS (monoclonal gammopathy of unknown significance), Nephrolithiasis, Obesity, Paroxysmal atrial fibrillation (CMS/HCC), Peptic ulceration, PSVT (paroxysmal supraventricular tachycardia) (CMS/HCC), Pulmonary hypertension (CMS/HCC), Rectal bleed, Sleep apnea, Trifascicular block, 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CMS/[facility]), Ventricular tachycardia (CMS/[facility]), and Vertigo.    Social History     Tobacco Use    Smoking status: Former Smoker     Packs/day: 1.50     Years: 10.00     Pack years: 15.00     Start date:      Quit date:      Years since quittin.3    Smokeless tobacco: Never Used    Tobacco comment: QUIT SMOKING    Substance Use Topics    Alcohol use: No     Comment: Daily caffeine use - one cup of coffee    Drug use: Defer       Results from last 7 days   Lab Units 21  0912 21  2335 21  0000   INR   --  2.15* 2.40   HEMOGLOBIN g/dL 13.2 12.2  --    HEMATOCRIT % 38.5 37.7  --    PLATELETS 10*3/mm3 142 163  --      Results from last 7 days   Lab Units 21  0548 21  2335   SODIUM mmol/L 140 139   POTASSIUM mmol/L 4.7 4.4   CHLORIDE mmol/L 112* 106   CO2 mmol/L 21.0* 24.6   BUN mg/dL 9 12   CREATININE mg/dL 0.52* 0.68   CALCIUM mg/dL 8.4* 9.7   BILIRUBIN mg/dL  --  0.6   ALK PHOS U/L  --  93   ALT (SGPT) U/L  --  9   AST (SGOT)  U/L  --  16   GLUCOSE mg/dL 76 108*     Anticoagulation history: Warfarin therapy is managed outpatient by the medication management clinic. Patient takes 5 mg Mon,Thu,Sat and 2.5 mg all other days.    Hospital Anticoagulation:  Consulting provider: SHAHID Randall  Start date: 4/8  Indication: Atrial fibrillation  Target INR: 2.0-3.0  Expected duration: Indefinite   Bridge Therapy: No                  Date 4/8            INR 2.15            Warfarin dose 5 mg              Potential drug interactions:  Ceftriaxone - may enhance the effect of warfarin    Relevant nutrition status: Regular cardiac diet    Other: None    Education complete?/ Date: PTA    Assessment/Plan:  Resume outpatient warfarin regimen; dose 5 mg today  Monitor for signs/symptoms of bleeding  Follow up INR tomorrow      Pharmacy will continue to follow until discharge or discontinuation of warfarin.     Mahendra Henao III, PharmD  PGY1 Pharmacy Resident  4/8/2021

## 2021-04-09 ENCOUNTER — APPOINTMENT (OUTPATIENT)
Dept: CARDIOLOGY | Facility: HOSPITAL | Age: 86
End: 2021-04-09

## 2021-04-09 PROBLEM — R29.898 WEAKNESS OF BOTH LOWER EXTREMITIES: Status: ACTIVE | Noted: 2021-04-09

## 2021-04-09 PROBLEM — M47.817 SPONDYLOSIS OF LUMBOSACRAL REGION WITHOUT MYELOPATHY OR RADICULOPATHY: Status: ACTIVE | Noted: 2021-04-09

## 2021-04-09 PROBLEM — M47.817 SPONDYLOSIS OF LUMBOSACRAL REGION WITHOUT MYELOPATHY OR RADICULOPATHY: Status: RESOLVED | Noted: 2021-04-09 | Resolved: 2021-04-09

## 2021-04-09 PROBLEM — G90.1 DYSAUTONOMIA: Status: ACTIVE | Noted: 2021-04-09

## 2021-04-09 LAB
ANION GAP SERPL CALCULATED.3IONS-SCNC: 6.2 MMOL/L (ref 5–15)
AORTIC ARCH: 2.3 CM
AORTIC DIMENSIONLESS INDEX: 0.7 (DI)
ASCENDING AORTA: 3.9 CM
BASOPHILS # BLD AUTO: 0.03 10*3/MM3 (ref 0–0.2)
BASOPHILS NFR BLD AUTO: 0.9 % (ref 0–1.5)
BH CV ECHO MEAS - ACS: 1.9 CM
BH CV ECHO MEAS - AO ARCH DIAM (PROXIMAL TRANS.): 2.3 CM
BH CV ECHO MEAS - AO MAX PG (FULL): 2.6 MMHG
BH CV ECHO MEAS - AO MAX PG: 6.1 MMHG
BH CV ECHO MEAS - AO MEAN PG (FULL): 1 MMHG
BH CV ECHO MEAS - AO MEAN PG: 3 MMHG
BH CV ECHO MEAS - AO ROOT AREA (BSA CORRECTED): 1.4
BH CV ECHO MEAS - AO ROOT AREA: 6.2 CM^2
BH CV ECHO MEAS - AO ROOT DIAM: 2.8 CM
BH CV ECHO MEAS - AO V2 MAX: 123 CM/SEC
BH CV ECHO MEAS - AO V2 MEAN: 88 CM/SEC
BH CV ECHO MEAS - AO V2 VTI: 26.9 CM
BH CV ECHO MEAS - AVA(I,A): 2 CM^2
BH CV ECHO MEAS - AVA(I,D): 2 CM^2
BH CV ECHO MEAS - AVA(V,A): 2.2 CM^2
BH CV ECHO MEAS - AVA(V,D): 2.2 CM^2
BH CV ECHO MEAS - BSA(HAYCOCK): 2.1 M^2
BH CV ECHO MEAS - BSA: 2 M^2
BH CV ECHO MEAS - BZI_BMI: 36.9 KILOGRAMS/M^2
BH CV ECHO MEAS - BZI_METRIC_HEIGHT: 162.6 CM
BH CV ECHO MEAS - BZI_METRIC_WEIGHT: 97.5 KG
BH CV ECHO MEAS - EDV(CUBED): 50.7 ML
BH CV ECHO MEAS - EDV(MOD-SP2): 57 ML
BH CV ECHO MEAS - EDV(MOD-SP4): 78 ML
BH CV ECHO MEAS - EDV(TEICH): 58.1 ML
BH CV ECHO MEAS - EF(CUBED): 76 %
BH CV ECHO MEAS - EF(MOD-BP): 57 %
BH CV ECHO MEAS - EF(MOD-SP2): 57.9 %
BH CV ECHO MEAS - EF(MOD-SP4): 53.8 %
BH CV ECHO MEAS - EF(TEICH): 68.8 %
BH CV ECHO MEAS - ESV(CUBED): 12.2 ML
BH CV ECHO MEAS - ESV(MOD-SP2): 24 ML
BH CV ECHO MEAS - ESV(MOD-SP4): 36 ML
BH CV ECHO MEAS - ESV(TEICH): 18.1 ML
BH CV ECHO MEAS - FS: 37.8 %
BH CV ECHO MEAS - IVS/LVPW: 0.92
BH CV ECHO MEAS - IVSD: 1.1 CM
BH CV ECHO MEAS - LA DIMENSION: 3.9 CM
BH CV ECHO MEAS - LA/AO: 1.4
BH CV ECHO MEAS - LAT PEAK E' VEL: 11.3 CM/SEC
BH CV ECHO MEAS - LV DIASTOLIC VOL/BSA (35-75): 38.7 ML/M^2
BH CV ECHO MEAS - LV MASS(C)D: 138.2 GRAMS
BH CV ECHO MEAS - LV MASS(C)DI: 68.5 GRAMS/M^2
BH CV ECHO MEAS - LV MAX PG: 3.5 MMHG
BH CV ECHO MEAS - LV MEAN PG: 2 MMHG
BH CV ECHO MEAS - LV SYSTOLIC VOL/BSA (12-30): 17.8 ML/M^2
BH CV ECHO MEAS - LV V1 MAX: 93.3 CM/SEC
BH CV ECHO MEAS - LV V1 MEAN: 62.7 CM/SEC
BH CV ECHO MEAS - LV V1 VTI: 19.2 CM
BH CV ECHO MEAS - LVIDD: 3.7 CM
BH CV ECHO MEAS - LVIDS: 2.3 CM
BH CV ECHO MEAS - LVLD AP2: 6.4 CM
BH CV ECHO MEAS - LVLD AP4: 6.5 CM
BH CV ECHO MEAS - LVLS AP2: 5.3 CM
BH CV ECHO MEAS - LVLS AP4: 5.4 CM
BH CV ECHO MEAS - LVOT AREA (M): 2.8 CM^2
BH CV ECHO MEAS - LVOT AREA: 2.8 CM^2
BH CV ECHO MEAS - LVOT DIAM: 1.9 CM
BH CV ECHO MEAS - LVPWD: 1.2 CM
BH CV ECHO MEAS - MED PEAK E' VEL: 9 CM/SEC
BH CV ECHO MEAS - MV A DUR: 0.18 SEC
BH CV ECHO MEAS - MV A MAX VEL: 27.1 CM/SEC
BH CV ECHO MEAS - MV DEC SLOPE: 327 CM/SEC^2
BH CV ECHO MEAS - MV DEC TIME: 0.18 SEC
BH CV ECHO MEAS - MV E MAX VEL: 79.5 CM/SEC
BH CV ECHO MEAS - MV E/A: 2.9
BH CV ECHO MEAS - MV MEAN PG: 1 MMHG
BH CV ECHO MEAS - MV P1/2T MAX VEL: 81.4 CM/SEC
BH CV ECHO MEAS - MV P1/2T: 72.9 MSEC
BH CV ECHO MEAS - MV V2 MEAN: 45.5 CM/SEC
BH CV ECHO MEAS - MV V2 VTI: 18.5 CM
BH CV ECHO MEAS - MVA P1/2T LCG: 2.7 CM^2
BH CV ECHO MEAS - MVA(P1/2T): 3 CM^2
BH CV ECHO MEAS - MVA(VTI): 2.9 CM^2
BH CV ECHO MEAS - PA ACC SLOPE: 832 CM/SEC^2
BH CV ECHO MEAS - PA ACC TIME: 0.1 SEC
BH CV ECHO MEAS - PA MAX PG (FULL): 1.2 MMHG
BH CV ECHO MEAS - PA MAX PG: 2.6 MMHG
BH CV ECHO MEAS - PA PR(ACCEL): 36.3 MMHG
BH CV ECHO MEAS - PA V2 MAX: 79.9 CM/SEC
BH CV ECHO MEAS - PI END-D VEL: 102 CM/SEC
BH CV ECHO MEAS - PULM A REVS DUR: 0.12 SEC
BH CV ECHO MEAS - PULM A REVS VEL: 15.6 CM/SEC
BH CV ECHO MEAS - PULM DIAS VEL: 47.2 CM/SEC
BH CV ECHO MEAS - PULM S/D: 0.67
BH CV ECHO MEAS - PULM SYS VEL: 31.6 CM/SEC
BH CV ECHO MEAS - PVA(V,A): 2.3 CM^2
BH CV ECHO MEAS - PVA(V,D): 2.3 CM^2
BH CV ECHO MEAS - QP/QS: 0.66
BH CV ECHO MEAS - RAP SYSTOLE: 3 MMHG
BH CV ECHO MEAS - RV MAX PG: 1.3 MMHG
BH CV ECHO MEAS - RV MEAN PG: 1 MMHG
BH CV ECHO MEAS - RV V1 MAX: 57.9 CM/SEC
BH CV ECHO MEAS - RV V1 MEAN: 37.3 CM/SEC
BH CV ECHO MEAS - RV V1 VTI: 11.5 CM
BH CV ECHO MEAS - RVOT AREA: 3.1 CM^2
BH CV ECHO MEAS - RVOT DIAM: 2 CM
BH CV ECHO MEAS - RVSP: 28 MMHG
BH CV ECHO MEAS - SI(AO): 82.1 ML/M^2
BH CV ECHO MEAS - SI(CUBED): 19.1 ML/M^2
BH CV ECHO MEAS - SI(LVOT): 27 ML/M^2
BH CV ECHO MEAS - SI(MOD-SP2): 16.4 ML/M^2
BH CV ECHO MEAS - SI(MOD-SP4): 20.8 ML/M^2
BH CV ECHO MEAS - SI(TEICH): 19.8 ML/M^2
BH CV ECHO MEAS - SV(AO): 165.6 ML
BH CV ECHO MEAS - SV(CUBED): 38.5 ML
BH CV ECHO MEAS - SV(LVOT): 54.4 ML
BH CV ECHO MEAS - SV(MOD-SP2): 33 ML
BH CV ECHO MEAS - SV(MOD-SP4): 42 ML
BH CV ECHO MEAS - SV(RVOT): 36.1 ML
BH CV ECHO MEAS - SV(TEICH): 40 ML
BH CV ECHO MEAS - TAPSE (>1.6): 1.4 CM
BH CV ECHO MEAS - TR MAX VEL: 250 CM/SEC
BH CV ECHO MEASUREMENTS AVERAGE E/E' RATIO: 7.83
BH CV XLRA - RV BASE: 3.8 CM
BH CV XLRA - RV LENGTH: 5.1 CM
BH CV XLRA - RV MID: 2.8 CM
BH CV XLRA - TDI S': 12 CM/SEC
BUN SERPL-MCNC: 10 MG/DL (ref 8–23)
BUN/CREAT SERPL: 15.4 (ref 7–25)
CALCIUM SPEC-SCNC: 10.2 MG/DL (ref 8.6–10.5)
CHLORIDE SERPL-SCNC: 105 MMOL/L (ref 98–107)
CO2 SERPL-SCNC: 26.8 MMOL/L (ref 22–29)
CREAT SERPL-MCNC: 0.65 MG/DL (ref 0.57–1)
DEPRECATED RDW RBC AUTO: 45.4 FL (ref 37–54)
EOSINOPHIL # BLD AUTO: 0.1 10*3/MM3 (ref 0–0.4)
EOSINOPHIL NFR BLD AUTO: 3 % (ref 0.3–6.2)
ERYTHROCYTE [DISTWIDTH] IN BLOOD BY AUTOMATED COUNT: 12.8 % (ref 12.3–15.4)
GFR SERPL CREATININE-BSD FRML MDRD: 105 ML/MIN/1.73
GLUCOSE SERPL-MCNC: 85 MG/DL (ref 65–99)
HCT VFR BLD AUTO: 38.3 % (ref 34–46.6)
HGB BLD-MCNC: 12.9 G/DL (ref 12–15.9)
INR PPP: 2.21 (ref 0.9–1.1)
LEFT ATRIUM VOLUME INDEX: 29 ML/M2
LV EF 2D ECHO EST: 60 %
LYMPHOCYTES # BLD AUTO: 1.5 10*3/MM3 (ref 0.7–3.1)
LYMPHOCYTES NFR BLD AUTO: 45.5 % (ref 19.6–45.3)
MAXIMAL PREDICTED HEART RATE: 134 BPM
MCH RBC QN AUTO: 32.8 PG (ref 26.6–33)
MCHC RBC AUTO-ENTMCNC: 33.7 G/DL (ref 31.5–35.7)
MCV RBC AUTO: 97.5 FL (ref 79–97)
MONOCYTES # BLD AUTO: 0.35 10*3/MM3 (ref 0.1–0.9)
MONOCYTES NFR BLD AUTO: 10.6 % (ref 5–12)
NEUTROPHILS NFR BLD AUTO: 1.31 10*3/MM3 (ref 1.7–7)
NEUTROPHILS NFR BLD AUTO: 39.7 % (ref 42.7–76)
PLATELET # BLD AUTO: 152 10*3/MM3 (ref 140–450)
PMV BLD AUTO: 9.3 FL (ref 6–12)
POTASSIUM SERPL-SCNC: 4.3 MMOL/L (ref 3.5–5.2)
PROTHROMBIN TIME: 24.2 SECONDS (ref 11.7–14.2)
RBC # BLD AUTO: 3.93 10*6/MM3 (ref 3.77–5.28)
SINUS: 3.3 CM
SODIUM SERPL-SCNC: 138 MMOL/L (ref 136–145)
STJ: 3 CM
STRESS TARGET HR: 114 BPM
TSH SERPL DL<=0.05 MIU/L-ACNC: 0.51 UIU/ML (ref 0.27–4.2)
VIT B12 BLD-MCNC: 1335 PG/ML (ref 211–946)
WBC # BLD AUTO: 3.3 10*3/MM3 (ref 3.4–10.8)

## 2021-04-09 PROCEDURE — 85025 COMPLETE CBC W/AUTO DIFF WBC: CPT | Performed by: INTERNAL MEDICINE

## 2021-04-09 PROCEDURE — G0378 HOSPITAL OBSERVATION PER HR: HCPCS

## 2021-04-09 PROCEDURE — 84443 ASSAY THYROID STIM HORMONE: CPT | Performed by: INTERNAL MEDICINE

## 2021-04-09 PROCEDURE — 93306 TTE W/DOPPLER COMPLETE: CPT

## 2021-04-09 PROCEDURE — 25010000002 CEFTRIAXONE PER 250 MG: Performed by: INTERNAL MEDICINE

## 2021-04-09 PROCEDURE — 97110 THERAPEUTIC EXERCISES: CPT

## 2021-04-09 PROCEDURE — 96365 THER/PROPH/DIAG IV INF INIT: CPT

## 2021-04-09 PROCEDURE — 97530 THERAPEUTIC ACTIVITIES: CPT

## 2021-04-09 PROCEDURE — 25010000002 PERFLUTREN (DEFINITY) 8.476 MG IN SODIUM CHLORIDE (PF) 0.9 % 10 ML INJECTION: Performed by: NURSE PRACTITIONER

## 2021-04-09 PROCEDURE — 99204 OFFICE O/P NEW MOD 45 MIN: CPT | Performed by: PSYCHIATRY & NEUROLOGY

## 2021-04-09 PROCEDURE — 82607 VITAMIN B-12: CPT | Performed by: INTERNAL MEDICINE

## 2021-04-09 PROCEDURE — 93306 TTE W/DOPPLER COMPLETE: CPT | Performed by: INTERNAL MEDICINE

## 2021-04-09 PROCEDURE — 97535 SELF CARE MNGMENT TRAINING: CPT

## 2021-04-09 PROCEDURE — 80048 BASIC METABOLIC PNL TOTAL CA: CPT | Performed by: NURSE PRACTITIONER

## 2021-04-09 PROCEDURE — 97166 OT EVAL MOD COMPLEX 45 MIN: CPT

## 2021-04-09 PROCEDURE — 85610 PROTHROMBIN TIME: CPT | Performed by: INTERNAL MEDICINE

## 2021-04-09 RX ADMIN — Medication 1 EACH: at 09:12

## 2021-04-09 RX ADMIN — ACETAMINOPHEN 650 MG: 325 TABLET ORAL at 06:37

## 2021-04-09 RX ADMIN — Medication 1000 MCG: at 09:12

## 2021-04-09 RX ADMIN — CEFTRIAXONE SODIUM 1 G: 1 INJECTION, SOLUTION INTRAVENOUS at 13:14

## 2021-04-09 RX ADMIN — WARFARIN 2.5 MG: 2.5 TABLET ORAL at 21:59

## 2021-04-09 RX ADMIN — SODIUM CHLORIDE, PRESERVATIVE FREE 10 ML: 5 INJECTION INTRAVENOUS at 09:13

## 2021-04-09 RX ADMIN — CALCIUM CARBONATE-VITAMIN D TAB 500 MG-200 UNIT 500 MG: 500-200 TAB at 09:13

## 2021-04-09 RX ADMIN — MESALAMINE 1.5 G: 0.38 CAPSULE, EXTENDED RELEASE ORAL at 09:12

## 2021-04-09 RX ADMIN — ACETAMINOPHEN 650 MG: 325 TABLET ORAL at 22:03

## 2021-04-09 RX ADMIN — CALCIUM CARBONATE-VITAMIN D TAB 500 MG-200 UNIT 500 MG: 500-200 TAB at 21:59

## 2021-04-09 RX ADMIN — SODIUM CHLORIDE, PRESERVATIVE FREE 10 ML: 5 INJECTION INTRAVENOUS at 21:59

## 2021-04-09 RX ADMIN — PERFLUTREN 2 ML: 6.52 INJECTION, SUSPENSION INTRAVENOUS at 11:42

## 2021-04-09 RX ADMIN — PANTOPRAZOLE SODIUM 40 MG: 40 TABLET, DELAYED RELEASE ORAL at 06:37

## 2021-04-09 NOTE — PROGRESS NOTES
Discharge Planning Assessment  [facility]     Patient Name: Brittany Villeda  MRN: [id number]  Today's Date: 4/9/2021    Admit Date: 4/7/2021    Discharge Needs Assessment     Row Name 04/09/21 1811       Living Environment    Lives With  child(donaldo), adult    Current Living Arrangements  home/apartment/condo    Primary Care Provided by  child(donaldo)    Provides Primary Care For  no one, unable/limited ability to care for self    Family Caregiver if Needed  child(donaldo), adult    Family Caregiver Names  isabellan has 11 children    Quality of Family Relationships  helpful;involved;supportive    Able to Return to Prior Arrangements  yes       Resource/Environmental Concerns    Resource/Environmental Concerns  none    Transportation Concerns  car, none       Transition Planning    Patient/Family Anticipates Transition to  home with family;home with help/services    Patient/Family Anticipated Services at Transition  none    Transportation Anticipated  other (see comments) will need ambulance at [place]       Discharge Needs Assessment    Readmission Within the Last 30 Days  no previous admission in last 30 days    Equipment Currently Used at Home  rollator;hospital bed;wheelchair    Anticipated Changes Related to Illness  none        Discharge Plan     Row Name 04/09/21 1815       Plan    Plan Comments  Patient lives in a single story house and her son, Nas Villeda, lives with her.  There is a ramp to enter.  He does not work outside the home and is home most of the time.  The patient has11 children and they assist as needed.  She does require assistance with all ADLS.  Daughter states that it has been difficult getting the patient to appointments so they are currently doing all MD appointments via telehealth.  Patient has a hospital bed, wheelchair, and rollator.  She is current with [facility].  Referral sent in [facility].  Discussed SNF, but patient wanting to return home.  Provided daughter with PC agency list per  her request.  At [place], patient will need ambulance transport home.  Ambulance necessity form and facesheet in [facility].  CCP will follow. Katherine Meek RN    Row Name 04/09/21 1814       Plan    Plan  return home with family and [facility]     Patient/Family in Agreement with Plan  yes    Plan Comments  Spoke with patient and Tracey Diana ( [phone]) at bedside.  Facesheet, PCP and pharmacy verified.        Continued Care and Services - Admitted Since 4/7/2021     Home Medical Care     Service Provider Request Status Selected Services Address Phone Fax Patient Preferred    [facility] [employer]  Pending - Request Sent N/A [address] [id number] [phone] --                Demographic Summary     Row Name 04/09/21 1810       General Information    Admission Type  observation    Arrived From  home    Required Notices Provided  Observation Status Notice    Referral Source  admission list    Reason for Consult  discharge planning    Preferred Language  English        Functional Status     Row Name 04/09/21 1811       Functional Status    Usual Activity Tolerance  fair    Current Activity Tolerance  fair       Functional Status, IADL    Medications  assistive person    Meal Preparation  completely dependent    Housekeeping  completely dependent    Laundry  completely dependent    Shopping  completely dependent       Mental Status    General Appearance WDL  WDL       Mental Status Summary    Recent Changes in Mental Status/Cognitive Functioning  no changes        Psychosocial    No documentation.       Abuse/Neglect    No documentation.       Legal    No documentation.       Substance Abuse    No documentation.       Patient Forms    No documentation.           Katherine Meek RN

## 2021-04-09 NOTE — THERAPY EVALUATION
Patient Name: Brittany Villeda  : 1935    MRN: [id number]                              Today's Date: 2021       Admit Date: 2021    Visit Dx:     ICD-10-CM ICD-9-CM   1. Generalized weakness  R53.1 780.79   2. Acute dyspnea  R06.00 786.09     Patient Active Problem List   Diagnosis   • Sciatica   • Non-toxic multinodular goiter   • Chronic midline low back pain with right-sided sciatica   • Essential hypertension   • Gastroesophageal reflux disease without esophagitis   • Cataract   • Pulmonary hypertension (CMS/HCC)   • Diastolic dysfunction   • HUGO (obstructive sleep apnea)   • Trifascicular block   • Leukopenia   • MGUS (monoclonal gammopathy of unknown significance)   • 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CMS/[facility])   • Other chest pain   • Lichen sclerosus   • Heart block   • Sleep-related hypoxia   • Bradycardia   • Shoulder arthritis   • History of atrial fibrillation   • Pacemaker   • Paroxysmal SVT (supraventricular tachycardia) (CMS/[facility])   • History of chest pain   • Palpitations   • Atrial fibrillation, persistent (CMS/HCC)   • Rectal bleeding   • Arthritis   • Ascending aortic aneurysm (CMS/HCC)   • Mediastinal lymphadenopathy   • Immobility   • Left knee pain   • Chronic anticoagulation   • Hemarthrosis of left knee   • Anemia   • Obesity (BMI 30-39.9)   • Generalized weakness   • Acute UTI (urinary tract infection)     Past Medical History:   Diagnosis Date   • Acute UTI (urinary tract infection) 2021   • Anemia    • Arrhythmia    • Arthritis    • Asthma    • Bradycardia    • Chest pain    • Colitis    • COPD (chronic obstructive pulmonary disease) (CMS/[facility])    • Diastolic dysfunction    • Essential hypertension 2016   • GERD (gastroesophageal reflux disease)    • Heart block    • Hypertension    • Hypertensive heart disease    • Hyperthyroidism    • Kidney stone    • Leukopenia    • Low back pain    • MGUS (monoclonal gammopathy of unknown significance)    •  Nephrolithiasis    • Obesity    • Paroxysmal atrial fibrillation (CMS/HCC)    • Peptic ulceration    • PSVT (paroxysmal supraventricular tachycardia) (CMS/HCC)    • Pulmonary hypertension (CMS/HCC)    • Rectal bleed    • Sleep apnea    • Trifascicular block    • 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)    • Ventricular tachycardia (CMS/HCC)     nonsustained   • Vertigo      Past Surgical History:   Procedure Laterality Date   • BACK SURGERY      lumbar fusion   • CARDIAC ELECTROPHYSIOLOGY PROCEDURE N/A 11/7/2018    Procedure: Pacemaker DC new   [place];  Surgeon: Jesus Granda MD;  Location: [facility] CATH INVASIVE LOCATION;  Service: Cardiology   • CHOLECYSTECTOMY     • COLONOSCOPY  06/16/2014    colitis, cryptitis,  tics, NBIH, TA w/low grade dysplasia   • COLONOSCOPY N/A 10/28/2019    Procedure: COLONOSCOPY WITH COLD AND HOT POLYPECTOMIES;  Surgeon: Micaela English MD;  Location: [facility] ENDOSCOPY;  Service: Gastroenterology   • HEMORRHOIDECTOMY     • HYSTERECTOMY     • KNEE ARTHROPLASTY     • MYOMECTOMY     • SHOULDER SURGERY     • SINUS SURGERY     • TOE NAIL AMPUTATION  03/04/2019   • TONSILLECTOMY       General Information     Row Name 04/09/21 1208          OT Time and Intention    Document Type  evaluation  -BL     Mode of Treatment  occupational therapy  -BL     Row Name 04/09/21 1208          General Information    Patient Profile Reviewed  yes  -BL     Prior Level of Function  independent:;ADL's;feeding;grooming;dressing;bathing Pt reports prior to November of 2020 she was independent with all ADLs/IADLs.  -BL     Existing Precautions/Restrictions  fall;oxygen therapy device and L/min  -BL     Barriers to Rehab  medically complex  -BL     Row Name 04/09/21 1208          Occupational Profile    Reason for Services/Referral (Occupational Profile)  Pt is an 85 y/o female who is a former smoker with a hx of UC, MGUS, pulmonary HTN, HUGO, obesity, immobility, atrial fibrillation on  warfarin, diastolic dysfunction and HTN that presents to [facility] complaining of SOB and weakness. Pt reports that she lives at home with grandchild who has other children that check in on her daily. Pt reports that prior to November 2020 she was independent with ADLs/IADLs at baseline but since then has become dependent on others for assistance with self-care. Pt reports that she spend majority of her day in the bed at home or sitting on EOB. Pt presents this date with decrease functional mobility, decrease ADL/IADL performance, decrease balance, decrease endurance/activity tolerance, decrease BUE strength and ROM.  -     Row Name 04/09/21 1208          Living Environment    Lives With  grandchild(donaldo)  -     Row Name 04/09/21 1208          Cognition    Orientation Status (Cognition)  oriented x 3  -BL     Row Name 04/09/21 1208          Safety Issues, Functional Mobility    Safety Issues Affecting Function (Mobility)  insight into deficits/self-awareness;awareness of need for assistance;judgment;positioning of assistive device;sequencing abilities  -     Impairments Affecting Function (Mobility)  balance;endurance/activity tolerance;shortness of breath;strength;range of motion (ROM);postural/trunk control;grasp  -BL       User Key  (r) = Recorded By, (t) = Taken By, (c) = Cosigned By    Initials Name Provider Type    Anna Gonzalez OT Occupational Therapist          Mobility/ADL's     Row Name 04/09/21 1230          Bed Mobility    Bed Mobility  scooting/bridging;supine-sit;sit-supine  -BL     Scooting/Bridging [place] (Bed Mobility)  maximum assist (25% patient effort);2 person assist  -BL     Supine-Sit [place] (Bed Mobility)  contact guard  -BL     Sit-Supine [place] (Bed Mobility)  maximum assist (25% patient effort);2 person assist  -BL     Bed Mobility, Safety Issues  decreased use of arms for pushing/pulling;decreased use of legs for bridging/pushing  -BL      Assistive Device (Bed Mobility)  bed rails;draw sheet;head of bed elevated  -     Comment (Bed Mobility)  Pt CGA to supine>sit transition with increase time for moving BLEs out of bed. Pt Max A for sit>supine and for scooting due to decrease use of BUEs/BLEs.  -     Row Name 04/09/21 1230          Transfers    Transfers  sit-stand transfer  -     Comment (Transfers)  Pt performed sit>stand transition with use of rolling walker and Min Ax2 with verbal cues for sequencing task as pt took side steps towards HOB for increase positioning.  -     Bed-Chair [place] (Transfers)  not tested  -     Sit-Stand [place] (Transfers)  minimum assist (75% patient effort);2 person assist  -     Row Name 04/09/21 1230          Sit-Stand Transfer    Assistive Device (Sit-Stand Transfers)  walker, front-wheeled  -     Row Name 04/09/21 1230          Functional Mobility    Functional Mobility- Device  rolling walker  -     Functional Mobility- Safety Issues  sequencing ability decreased  -     Functional Mobility- Comment  side step towards HOB with rolling walker and Min A x2 with verbal cues.  -     Row Name 04/09/21 1230          Activities of Daily Living    BADL Assessment/Intervention  lower body dressing;grooming  -     Row Name 04/09/21 1230          Lower Body Dressing Assessment/Training    [place] Level (Lower Body Dressing)  doff;don;socks;dependent (less than 25% patient effort)  -     Row Name 04/09/21 1230          Grooming Assessment/Training    [place] Level (Grooming)  wash face, hands;set up  -     Position (Grooming)  edge of bed sitting  -       User Key  (r) = Recorded By, (t) = Taken By, (c) = Cosigned By    Initials Name Provider Type     Anna Mckeon OT Occupational Therapist        Obj/Interventions     Row Name 04/09/21 1235          Sensory Assessment (Somatosensory)    Sensory Assessment (Somatosensory)  UE sensation intact  -     Row Name 04/09/21 1235           Vision Assessment/Intervention    Visual Impairment/Limitations  WFL  -BL     Row Name 04/09/21 1235          Range of Motion Comprehensive    General Range of Motion  upper extremity range of motion deficits identified  -BL     Comment, General Range of Motion  LUE: 25% AROM-AAROM shoulder flexion due to rotator cuff tear, RUE: 50% AROM  -BL     Row Name 04/09/21 1235          Strength Comprehensive (MMT)    Comment, General Manual Muscle Testing (MMT) Assessment  BUE 3-/5  -BL     Row Name 04/09/21 1235          Motor Skills    Motor Skills  coordination  -     Coordination  WFL;dysdiadochokinesia;dysmetria;finger to nose;bilateral  -BL     Row Name 04/09/21 1235          Balance    Balance Assessment  sitting static balance;sitting dynamic balance;sit to stand dynamic balance;standing static balance;standing dynamic balance  -BL     Static Sitting Balance  mild impairment;unsupported;sitting, edge of bed  -BL     Dynamic Sitting Balance  mild impairment;unsupported;sitting, edge of bed  -BL     Sit to Stand Dynamic Balance  mild impairment;supported;standing  -BL     Static Standing Balance  mild impairment;supported;standing  -BL     Dynamic Standing Balance  mild impairment;supported;standing  -BL     Balance Interventions  sitting;standing;sit to stand;supported;static;dynamic;occupation based/functional task  -BL     Comment, Balance  Pt reports being dizzy while sitting EOB with 1 LOB episode noted this date.  -BL       User Key  (r) = Recorded By, (t) = Taken By, (c) = Cosigned By    Initials Name Provider Type    BL Anna Mckeon OT Occupational Therapist        Goals/Plan     Row Name 04/09/21 1246          Bed Mobility Goal 1 (OT)    Activity/Assistive Device (Bed Mobility Goal 1, OT)  bed mobility activities, all  -BL     [place] Level/Cues Needed (Bed Mobility Goal 1, OT)  supervision required  -BL     Time Frame (Bed Mobility Goal 1, OT)  short term goal (STG);2 weeks  -BL      Progress/Outcomes (Bed Mobility Goal 1, OT)  continuing progress toward goal  -BL     Row Name 04/09/21 1246          Transfer Goal 1 (OT)    Activity/Assistive Device (Transfer Goal 1, OT)  sit-to-stand/stand-to-sit;bed-to-chair/chair-to-bed;toilet;shower chair;commode, 3-in-1  -BL     [place] Level/Cues Needed (Transfer Goal 1, OT)  contact guard assist  -BL     Time Frame (Transfer Goal 1, OT)  short term goal (STG);2 weeks  -BL     Progress/Outcome (Transfer Goal 1, OT)  continuing progress toward goal  -BL     Row Name 04/09/21 1246          Bathing Goal 1 (OT)    Activity/Device (Bathing Goal 1, OT)  upper body bathing;lower body bathing  -BL     [place] Level/Cues Needed (Bathing Goal 1, OT)  minimum assist (75% or more patient effort)  -BL     Time Frame (Bathing Goal 1, OT)  short term goal (STG);2 weeks  -BL     Progress/Outcomes (Bathing Goal 1, OT)  continuing progress toward goal  -BL     Row Name 04/09/21 1246          Dressing Goal 1 (OT)    Activity/Device (Dressing Goal 1, OT)  upper body dressing;lower body dressing  -BL     [place]/Cues Needed (Dressing Goal 1, OT)  minimum assist (75% or more patient effort)  -BL     Time Frame (Dressing Goal 1, OT)  short term goal (STG);2 weeks  -BL     Progress/Outcome (Dressing Goal 1, OT)  continuing progress toward goal  -BL     Row Name 04/09/21 1246          Grooming Goal 1 (OT)    Activity/Device (Grooming Goal 1, OT)  grooming skills, all  -BL     [place] (Grooming Goal 1, OT)  modified independence  -BL     Time Frame (Grooming Goal 1, OT)  short term goal (STG);2 weeks  -BL     Progress/Outcome (Grooming Goal 1, OT)  continuing progress toward goal  -BL     Row Name 04/09/21 1246          Self-Feeding Goal 1 (OT)    Activity/Device (Self-Feeding Goal 1, OT)  self-feeding skills, all  -BL     [place] Level/Cues Needed (Self-Feeding Goal 1, OT)  modified independence  -BL     Time Frame (Self-Feeding Goal 1, OT)  short term goal  (STG);2 weeks  -BL     Progress/Outcomes (Self-Feeding Goal 1, OT)  continuing progress toward goal  -BL     Row Name 04/09/21 1246          Strength Goal 1 (OT)    Strength Goal 1 (OT)  Pt will increase BUE strength to 3+/5 prior to D/C.  -BL     Time Frame (Strength Goal 1, OT)  short term goal (STG);2 weeks  -BL     Progress/Outcome (Strength Goal 1, OT)  continuing progress toward goal  -BL     Row Name 04/09/21 1246          Therapy Assessment/Plan (OT)    Planned Therapy Interventions (OT)  activity tolerance training;functional balance retraining;occupation/activity based interventions;strengthening exercise;transfer/mobility retraining;ROM/therapeutic exercise;IADL retraining;BADL retraining;patient/caregiver education/training;passive ROM/stretching  -BL       User Key  (r) = Recorded By, (t) = Taken By, (c) = Cosigned By    Initials Name Provider Type     Anna Mckeon, JONATHAN Occupational Therapist        Clinical Impression     Row Name 04/09/21 1238          Pain Assessment    Additional Documentation  Pain Scale: Numbers Pre/Post-Treatment (Group)  -BL     Row Name 04/09/21 1238          Pain Scale: Numbers Pre/Post-Treatment    Pretreatment Pain Rating  3/10  -BL     Posttreatment Pain Rating  3/10  -BL     Pain Location - Orientation  lower  -BL     Pain Location  back  -BL     Pain Intervention(s)  Repositioned;Ambulation/increased activity  -BL     Row Name 04/09/21 1238          Plan of Care Review    Plan of Care Reviewed With  patient  -BL     Progress  improving  -BL     Outcome Summary  Pt seen by OT this date for evaluation. Upon arrival pt lying supine in bed finishing breakfast, 3/10 pain in lower back, A&O x3. Pt performed bed mobility to sit EOB with CGA and increase time to complete task due to difficulty with moving BLEs off bed. Pt noted with 1 LOB episode noted while sitting EOB, BP checked 102/57. Pt completed grooming task sitting EOB with set-up. Pt dependent for LB dressing task  this date. Pt performed sit>stand transition with Min A x2 and use of rolling walker with verbal cues to side step towards HOB for increase positioning. Pt very motivated this date with positive attitude towards therapy and getting better. Pt requiring Max A for sit>supine and for scooting up in bed. Pt left in supine position, needs in reach, alarm on. Pt to benefit from skilled OT services to address goals and deficits. OT wore mask, gloves, glasses,hand hygiene performed. OT rec HH-OT at D/C as pt is refusing SNF/DANN.  -     Row Name 04/09/21 1238          Therapy Assessment/Plan (OT)    Rehab Potential (OT)  good, to achieve stated therapy goals  -     Criteria for Skilled Therapeutic Interventions Met (OT)  yes;skilled treatment is necessary  -     Therapy Frequency (OT)  5 times/wk  -     Row Name 04/09/21 1238          Therapy Plan Review/Discharge Plan (OT)    Anticipated Discharge Disposition (OT)  home with home health  -     Row Name 04/09/21 1238          Vital Signs    Pre Patient Position  Supine  -BL     Intra Patient Position  Standing  -BL     Post Patient Position  Supine  -BL     Row Name 04/09/21 1238          Positioning and Restraints    Pre-Treatment Position  in bed  -BL     Post Treatment Position  bed  -BL     In Bed  notified nsg;supine;call light within reach;encouraged to call for assist;exit alarm on  -BL       User Key  (r) = Recorded By, (t) = Taken By, (c) = Cosigned By    Initials Name Provider Type    Anna Gonzalez, OT Occupational Therapist        Outcome Measures     Row Name 04/09/21 1248          How much help from another is currently needed...    Putting on and taking off regular lower body clothing?  1  -BL     Bathing (including washing, rinsing, and drying)  2  -BL     Toileting (which includes using toilet bed pan or urinal)  1  -BL     Putting on and taking off regular upper body clothing  2  -BL     Taking care of personal grooming (such as brushing  teeth)  3  -BL     Eating meals  3  -BL     AM-PAC 6 Clicks Score (OT)  12  -BL     Row Name 04/09/21 1248          Functional Assessment    Outcome Measure Options  AM-PAC 6 Clicks Daily Activity (OT)  -BL       User Key  (r) = Recorded By, (t) = Taken By, (c) = Cosigned By    Initials Name Provider Type     Anna Mckeon OT Occupational Therapist        Occupational Therapy Education                 Title: PT OT SLP Therapies (In Progress)     Topic: Occupational Therapy (In Progress)     Point: ADL training (Done)     Description:   Instruct learner(s) on proper safety adaptation and remediation techniques during self care or transfers.   Instruct in proper use of assistive devices.              Learning Progress Summary           Patient Acceptance, E, VU,NR by  at 4/9/2021 1249    Comment: transfer safety, ADL task modification                   Point: Home exercise program (Not Started)     Description:   Instruct learner(s) on appropriate technique for monitoring, assisting and/or progressing therapeutic exercises/activities.              Learner Progress:  Not documented in this visit.          Point: Precautions (Not Started)     Description:   Instruct learner(s) on prescribed precautions during self-care and functional transfers.              Learner Progress:  Not documented in this visit.          Point: Body mechanics (Done)     Description:   Instruct learner(s) on proper positioning and spine alignment during self-care, functional mobility activities and/or exercises.              Learning Progress Summary           Patient Acceptance, E, VU,NR by  at 4/9/2021 1249    Comment: transfer safety, ADL task modification                               User Key     Initials Effective Dates Name Provider Type [facility] 01/05/21 -  Anna Mckeon OT Occupational Therapist OT              OT Recommendation and Plan  Planned Therapy Interventions (OT): activity tolerance training, functional  balance retraining, occupation/activity based interventions, strengthening exercise, transfer/mobility retraining, ROM/therapeutic exercise, IADL retraining, BADL retraining, patient/caregiver education/training, passive ROM/stretching  Therapy Frequency (OT): 5 times/wk  Plan of Care Review  Plan of Care Reviewed With: patient  Progress: improving  Outcome Summary: Pt seen by OT this date for evaluation. Upon arrival pt lying supine in bed finishing breakfast, 3/10 pain in lower back, A&O x3. Pt performed bed mobility to sit EOB with CGA and increase time to complete task due to difficulty with moving BLEs off bed. Pt noted with 1 LOB episode noted while sitting EOB, BP checked 102/57. Pt completed grooming task sitting EOB with set-up. Pt dependent for LB dressing task this date. Pt performed sit>stand transition with Min A x2 and use of rolling walker with verbal cues to side step towards HOB for increase positioning. Pt very motivated this date with positive attitude towards therapy and getting better. Pt requiring Max A for sit>supine and for scooting up in bed. Pt left in supine position, needs in reach, alarm on. Pt to benefit from skilled OT services to address goals and deficits. OT wore mask, gloves, glasses,hand hygiene performed. OT rec HH-OT at D/C as pt is refusing SNF/DANN.     Time Calculation:   Time Calculation- OT     Row Name 04/09/21 1250             Time Calculation- OT    OT Start Time  0824  -BL      OT Stop Time  0900  -BL      OT Time Calculation (min)  36 min  -BL      Total Timed Code Minutes- OT  32 minute(s)  -BL      OT Received On  04/09/21  -BL      OT - Next Appointment  04/12/21  -BL      OT Goal Re-Cert Due Date  04/23/21  -BL        User Key  (r) = Recorded By, (t) = Taken By, (c) = Cosigned By    Initials Name Provider Type    Anna Gonzalez OT Occupational Therapist        Therapy Charges for Today     Code Description Service Date Service Provider Modifiers Qty     29404522805 HC OT EVAL MOD COMPLEXITY 2 4/9/2021 Anna Mckeon OT GO 1    87621067334 HC OT THERAPEUTIC ACT EA 15 MIN 4/9/2021 Anna Mckeon OT GO 1    43216552788 HC OT SELF CARE/MGMT/TRAIN EA 15 MIN 4/9/2021 Anna Mckeon OT GO 1               Anna Mckeon OT  4/9/2021

## 2021-04-09 NOTE — NURSING NOTE
Neuro consulted with patient about finding information regarding the pacemaker and whether or not it is compatible with MRI. Nurse called [employer] regarding implanted device. Tech support at [employer] guided towards a 16-point checklist for cardiology and radiology to use to determine compatibility. Paged out to NP on call for [facility] and gave her information regarding pacemaker make and model. Will continue to try and find resolution or answer.

## 2021-04-09 NOTE — CONSULTS
I was requested to see patient.  Patient is a member of a local [employer] which is very supportive.  She had a her daughter present who is a missionary.  There are no specific spiritual needs at this time, but she was open to having prayer.

## 2021-04-09 NOTE — PROGRESS NOTES
Pharmacy Consult: Warfarin Dosing/ Monitoring    Brittany Villeda is a 86 y.o. female, estimated creatinine clearance is 57.2 mL/min (by C-G formula based on SCr of 0.65 mg/dL). weighing 97.5 kg (215 lb).     has a past medical history of Acute UTI (urinary tract infection) (2021), Anemia, Arrhythmia, Arthritis, Asthma, Bradycardia, Chest pain, Colitis, COPD (chronic obstructive pulmonary disease) (CMS/[facility]), Diastolic dysfunction, Essential hypertension (2016), GERD (gastroesophageal reflux disease), Heart block, Hypertension, Hypertensive heart disease, Hyperthyroidism, Kidney stone, Leukopenia, Low back pain, MGUS (monoclonal gammopathy of unknown significance), Nephrolithiasis, Obesity, Paroxysmal atrial fibrillation (CMS/[facility]), Peptic ulceration, PSVT (paroxysmal supraventricular tachycardia) (CMS/[facility]), Pulmonary hypertension (CMS/[facility]), Rectal bleed, Sleep apnea, Trifascicular block, 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CMS/[facility]), Ventricular tachycardia (CMS/[facility]), and Vertigo.    Social History     Tobacco Use    Smoking status: Former Smoker     Packs/day: 1.50     Years: 10.00     Pack years: 15.00     Start date:      Quit date:      Years since quittin.3    Smokeless tobacco: Never Used    Tobacco comment: QUIT SMOKING    Substance Use Topics    Alcohol use: No     Comment: Daily caffeine use - one cup of coffee    Drug use: Defer       Results from last 7 days   Lab Units 21  0725 21  0912 21  2335 21  0000   INR  2.21*  --  2.15* 2.40   HEMOGLOBIN g/dL 12.9 13.2 12.2  --    HEMATOCRIT % 38.3 38.5 37.7  --    PLATELETS 10*3/mm3 152 142 163  --      Results from last 7 days   Lab Units 21  0725 21  0548 21  2335   SODIUM mmol/L 138 140 139   POTASSIUM mmol/L 4.3 4.7 4.4   CHLORIDE mmol/L 105 112* 106   CO2 mmol/L 26.8 21.0* 24.6   BUN mg/dL 10 9 12   CREATININE mg/dL 0.65 0.52* 0.68   CALCIUM mg/dL 10.2 8.4* 9.7   BILIRUBIN  mg/dL  --   --  0.6   ALK PHOS U/L  --   --  93   ALT (SGPT) U/L  --   --  9   AST (SGOT) U/L  --   --  16   GLUCOSE mg/dL 85 76 108*     Anticoagulation history: Warfarin therapy is managed outpatient by the medication management clinic. Patient takes 5 mg Mon,Thu,Sat and 2.5 mg all other days    Hospital Anticoagulation:  Consulting provider: SHAHID Randall  Start date: 4/8  Indication: Atrial fibrillation  Target INR: 2.0-3.0  Expected duration: Indefinite   Bridge Therapy: No                  Date 4/8 4/9           INR 2.15 2.21           Warfarin dose 5 mg 2.5mg             Potential drug interactions:  Ceftriaxone - may enhance the effect of warfarin    Relevant nutrition status: Regular cardiac diet    Other: None    Education complete?/ Date: PTA    Assessment/Plan:  INR in target today at 2.21 therefore will continue outpatient warfarin regimen as above in anticoagulation history....patient will receive 2.5mg of warfarin today.  Monitor for signs/symptoms of bleeding  Follow up INR tomorrow    Pharmacy will continue to follow until discharge or discontinuation of warfarin.     Fredi Granda [facility]  4/9/2021

## 2021-04-09 NOTE — PLAN OF CARE
Goal Outcome Evaluation:     Progress: no change  Outcome Summary: VSS. Patient still c/o of weakness in lower extremities. Patient worked with PT and was able to preform some activities. Patient experienced dizziness with activity. Urine culture positive for E.Coli. MD notified and patient will continue will rocephin. Patient was weaned of O2 and continues to sat at %. Neuro consulting and determining if patient is able to recieve MRI. Will continue to monitor.

## 2021-04-09 NOTE — PLAN OF CARE
Goal Outcome Evaluation:  Plan of Care Reviewed With: patient  Progress: improving  Outcome Summary: Pt seen by OT this date for evaluation. Upon arrival pt lying supine in bed finishing breakfast, 3/10 pain in lower back, A&O x3. Pt performed bed mobility to sit EOB with CGA and increase time to complete task due to difficulty with moving BLEs off bed. Pt noted with 1 LOB episode noted while sitting EOB, BP checked 102/57. Pt completed grooming task sitting EOB with set-up. Pt dependent for LB dressing task this date. Pt performed sit>stand transition with Min A x2 and use of rolling walker with verbal cues to side step towards HOB for increase positioning. Pt very motivated this date with positive attitude towards therapy and getting better. Pt requiring Max A for sit>supine and for scooting up in bed. Pt left in supine position, needs in reach, alarm on. Pt to benefit from skilled OT services to address goals and deficits. OT wore mask, gloves, glasses,hand hygiene performed. OT rec HH-OT at D/C as pt is refusing SNF/[facility].

## 2021-04-09 NOTE — NURSING NOTE
Update on pacemaker compatibility: Spoke to [employer] [facility] for [facility]. Per her notes on patient, the pacemaker is MRI compatible, however the 16 point checklist and form from MRI department still needs to be filled out by  [facility].   MRI department is faxing form to [facility] and form should be in patient's chart. Per [facility] the rounding physician needs to be called and reminded Saturday morning to fill out form. Upon completion of form Dr. Daniels (neuro) needs to be notified.

## 2021-04-09 NOTE — CONSULTS
Patient Identification:  NAME:  Brittany Villeda  Age:  86 y.o.   Sex:  female   :  1935   MRN:  [id number]       Chief complaint: She does not have one, reason for consult leg weakness    History of present illness: Patient is an 86-year-old right-handed black female with history of hypertension hypothyroidism low back pain which is chronic paroxysmal A. fib she is on Coumadin she comes to the hospital after she tried to get out of a chair and was not able to move her legs according to the daughter the patient uses a walker and over the last month has been slowing down a little bit she does have episodes in which she is walking with a walker and seems to go off to one side.  Context is a patient who does have occasional low blood pressure as measured here and some of this may be associated with lightheaded dizziness and generalized weakness nonetheless location she complains of leg weakness bilaterally there is no pain in the legs no numbness in the legs and according to her really no change in bowel bladder habits she does have low back pain is context modifying factors none quality as described again no paresthesias no paralysis she has not been on steroids that could have caused anything either according to the daughter.  CT of the head by my independent eyeball review shows atrophy and calcium in the brain but nothing acute    Past medical history:  Past Medical History:   Diagnosis Date   • Acute UTI (urinary tract infection) 2021   • Anemia    • Arrhythmia    • Arthritis    • Asthma    • Bradycardia    • Chest pain    • Colitis    • COPD (chronic obstructive pulmonary disease) (CMS/[facility])    • Diastolic dysfunction    • Essential hypertension 2016   • GERD (gastroesophageal reflux disease)    • Heart block    • Hypertension    • Hypertensive heart disease    • Hyperthyroidism    • Kidney stone    • Leukopenia    • Low back pain    • MGUS (monoclonal gammopathy of unknown significance)    •  Nephrolithiasis    • Obesity    • Paroxysmal atrial fibrillation (CMS/H[facility])    • Peptic ulceration    • PSVT (paroxysmal supraventricular tachycardia) (CMS/HCC)    • Pulmonary hypertension (CMS/HCC)    • Rectal bleed    • Sleep apnea    • Trifascicular block    • 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)    • Ventricular tachycardia (CMS/HCC)     nonsustained   • Vertigo        Past surgical history:  Past Surgical History:   Procedure Laterality Date   • BACK SURGERY      lumbar fusion   • CARDIAC ELECTROPHYSIOLOGY PROCEDURE N/A 11/7/2018    Procedure: Pacemaker DC new   [place];  Surgeon: Jesus Granda MD;  Location: [facility] CATH INVASIVE LOCATION;  Service: Cardiology   • CHOLECYSTECTOMY     • COLONOSCOPY  06/16/2014    colitis, cryptitis,  tics, NBIH, TA w/low grade dysplasia   • COLONOSCOPY N/A 10/28/2019    Procedure: COLONOSCOPY WITH COLD AND HOT POLYPECTOMIES;  Surgeon: Micaela English MD;  Location: [facility] ENDOSCOPY;  Service: Gastroenterology   • HEMORRHOIDECTOMY     • HYSTERECTOMY     • KNEE ARTHROPLASTY     • MYOMECTOMY     • SHOULDER SURGERY     • SINUS SURGERY     • TOE NAIL AMPUTATION  03/04/2019   • TONSILLECTOMY         Allergies:  Codeine, Amitriptyline, Amoxicillin-pot clavulanate, Aspirin, Bactrim [sulfamethoxazole-trimethoprim], Carisoprodol-aspirin-codeine, Iodinated diagnostic agents, Latex, Naproxen, Nsaids, Soma compound with codeine [carisoprodol-aspirin-codeine], Sulfa antibiotics, and Tramadol    Home medications:  Medications Prior to Admission   Medication Sig Dispense Refill Last Dose   • acetaminophen (TYLENOL) 500 MG tablet Take 1 tablet by mouth Every 6 (Six) Hours As Needed for Mild Pain .      • albuterol sulfate  (90 Base) MCG/ACT inhaler Inhale 2 puffs Every 6 (Six) Hours As Needed for Wheezing. 1 inhaler 0    • calcium carbonate-cholecalciferol 500-400 MG-UNIT tablet tablet Take 1 tablet by mouth 2 (two) times a day.      • docusate sodium  (COLACE) 100 MG capsule Take 100 mg by mouth Every Night.      • mesalamine (APRISO) 0.375 g 24 hr capsule TAKE 4 CAPSULES DAILY 360 capsule 0    • omeprazole (priLOSEC) 20 MG capsule TAKE 1 CAPSULE EVERY DAY 90 capsule 0    • vitamin B-12 (CYANOCOBALAMIN) 1000 MCG tablet Take 1,000 mcg by mouth Daily.      • warfarin (COUMADIN) 1 MG tablet Take 5 mg by mouth Daily.      • Wheat Dextrin (BENEFIBER DRINK MIX PO) Take  by mouth Every 2 (Two) Hours As Needed.           Hospital medications:  calcium-vitamin D, 500 mg, Oral, BID  cefTRIAXone, 1 g, Intravenous, Q24H  docusate sodium, 100 mg, Oral, Nightly  mesalamine, 1.5 g, Oral, Daily  pantoprazole, 40 mg, Oral, QAM  sodium chloride, 10 mL, Intravenous, Q12H  vitamin B-12, 1,000 mcg, Oral, Daily  warfarin, 2.5 mg, Oral, Once per day on Sun Tue Wed Fri  warfarin, 5 mg, Oral, Once per day on  Sat  wheat dextrin, 1 each, Oral, Daily      Pharmacy to dose warfarin,       •  acetaminophen **OR** acetaminophen **OR** acetaminophen  •  albuterol sulfate HFA  •  calcium carbonate  •  ondansetron **OR** ondansetron  •  Pharmacy to dose warfarin  •  sodium chloride    Family history:  Family History   Problem Relation Age of Onset   • Diabetes Mother    • Breast cancer Sister    • Kidney cancer Sister    • Heart disease Sister    • Prostate cancer Brother    • Prostate cancer Brother    • Prostate cancer Brother    • Malig Hyperthermia Neg Hx        Social history:  Social History     Tobacco Use   • Smoking status: Former Smoker     Packs/day: 1.50     Years: 10.00     Pack years: 15.00     Start date:      Quit date:      Years since quittin.3   • Smokeless tobacco: Never Used   • Tobacco comment: QUIT SMOKING    Substance Use Topics   • Alcohol use: No     Comment: Daily caffeine use - one cup of coffee   • Drug use: Defer       Review of systems:    Not had bowel incontinence but had some today because she could not get back to the room soon enough  has low back pain denies just about everything else cannot give a good review of systems daughter is here who cannot give much else in the way of review of systems but I reviewed the chart fully    Objective:  Vitals Ranges:   Temp:  [97.4 °F (36.3 °C)-97.9 °F (36.6 °C)] 97.9 °F (36.6 °C)  Heart Rate:  [70-76] 72  Resp:  [16-18] 18  BP: ()/(41-71) 114/70      Physical Exam:  Patient is awake alert oriented times 3 fund of knowledge poor attention span concentration fair recent remote memory not so good language function normal well-developed well-nourished in no distress pupils 2 and half constricting to 2 unable to visualize disc retinas extraocular movements are full without nystagmus nasolabial folds palate tongue symmetrical decreased hearing normal facial sensation head turning shoulder shrug.  Visual fields are full motor she has antalgesic weaknes in the upper extremities but gives good  strength she cannot lift the proximal arms off the bed and has rotator cuff problems according to her history.  She was able to bend the knees in bed but not lift the legs distal strength however dorsi and plantar flexion is at least 5- out of 5 reflexes trace throughout symmetrical toes definitely downgoing bilaterally sensation normal light touch face both arms both legs coordination slow in the upper extremities Station and gait was impossible.  Heart is regular without murmur neck supple without bruits extremities no clubbing cyanosis there may be some peripheral edema in the feet she is obese visual fields are full    Results review:   I reviewed the patient's new clinical results.    Data review:  Lab Results (last 24 hours)     Procedure Component Value Units Date/Time    Urine Culture - Urine, Urine, Catheter [[id number]]  (Abnormal) Collected: 04/08/21 0107    Specimen: Urine, Catheter Updated: 04/09/21 1032     Urine Culture >100,000 CFU/mL Escherichia coli    CBC & Differential [[id number]]  (Abnormal)  Collected: 04/09/21 0725    Specimen: Blood Updated: 04/09/21 0900    Narrative:      The following orders were created for panel order CBC & Differential.  Procedure                               Abnormality         Status                     ---------                               -----------         ------                     CBC Auto Differential[[id number]]        Abnormal            Final result                 Please view results for these tests on the individual orders.    CBC Auto Differential [[id number]]  (Abnormal) Collected: 04/09/21 0725    Specimen: Blood Updated: 04/09/21 0900     WBC 3.30 10*3/mm3      RBC 3.93 10*6/mm3      Hemoglobin 12.9 g/dL      Hematocrit 38.3 %      MCV 97.5 fL      MCH 32.8 pg      MCHC 33.7 g/dL      RDW 12.8 %      RDW-SD 45.4 fl      MPV 9.3 fL      Platelets 152 10*3/mm3      Neutrophil % 39.7 %      Lymphocyte % 45.5 %      Monocyte % 10.6 %      Eosinophil % 3.0 %      Basophil % 0.9 %      Neutrophils, Absolute 1.31 10*3/mm3      Lymphocytes, Absolute 1.50 10*3/mm3      Monocytes, Absolute 0.35 10*3/mm3      Eosinophils, Absolute 0.10 10*3/mm3      Basophils, Absolute 0.03 10*3/mm3     Vitamin B12 [[id number]]  (Abnormal) Collected: 04/09/21 0725    Specimen: Blood Updated: 04/09/21 0835     Vitamin B-12 1,335 pg/mL     Narrative:      Results may be falsely increased if patient taking Biotin.      TSH Rfx On Abnormal To Free T4 [[id number]]  (Normal) Collected: 04/09/21 0725    Specimen: Blood Updated: 04/09/21 0826     TSH 0.509 uIU/mL     Basic Metabolic Panel [[id number]]  (Normal) Collected: 04/09/21 0725    Specimen: Blood Updated: 04/09/21 0820     Glucose 85 mg/dL      BUN 10 mg/dL      Creatinine 0.65 mg/dL      Sodium 138 mmol/L      Potassium 4.3 mmol/L      Chloride 105 mmol/L      CO2 26.8 mmol/L      Calcium 10.2 mg/dL      eGFR  African Amer 105 mL/min/1.73      BUN/Creatinine Ratio 15.4     Anion Gap 6.2 mmol/L     Narrative:      GFR Normal >60  Chronic  Kidney Disease <60  Kidney Failure <15      Protime-INR [[id number]]  (Abnormal) Collected: 04/09/21 0725    Specimen: Blood Updated: 04/09/21 0814     Protime 24.2 Seconds      INR 2.21           Imaging:  Imaging Results (Last 24 Hours)     ** No results found for the last 24 hours. **      pPE worn at all times washed before washed afterwards disposed of everything properly was not within 6 feet of her for more than few minutes during exam no aerosols used at any point    Assessment and Plan:     This patient has weakness in the legs more so than the arms although she states that this is more acute listening to the history it sounds like she has had some leg weakness previously and has had to use a walker and has had sort of a slow decline over the last few weeks or so.  Note that her proximal leg weakness seems more prominent than distal which her dorsi and plantar flexion is good also upper extremity strength is good except for some Antalgesic weakness.  Her toes are downgoing bilaterally and there is no evidence of Guillain-Barré syndrome  I am confident that she has significant spinal stenosis at more than 1 level I would like to check an MRI of the low back where she has low back pain but possibly even further up the spine later.  She does not really look myelopathic although her legs are weak and her upper extremities seem to be okay.  Has a pacemaker and its not sure if she can get an MRI scan of the low back.  I will attempt to have that checked out.  I will check a CPK level but I am not convinced that she has polymyositis. there is some definite quadriceps weakness more so than distal weakness and there is no history of steroid use  She does not really look like primary Parkinson's disease either.  Note that she does sound very much like she has some dysautonomia and when she stands up and walks with a walker over the last month or so I believe she has orthostatic hypotension.      Jp Daniels,  MD  04/09/21  16:25 EDT

## 2021-04-09 NOTE — THERAPY TREATMENT NOTE
Patient Name: Brittany Villeda  : 1935    MRN: [id number]                              Today's Date: 2021       Admit Date: 2021    Visit Dx:     ICD-10-CM ICD-9-CM   1. Generalized weakness  R53.1 780.79   2. Acute dyspnea  R06.00 786.09     Patient Active Problem List   Diagnosis   • Sciatica   • Non-toxic multinodular goiter   • Chronic midline low back pain with right-sided sciatica   • Essential hypertension   • Gastroesophageal reflux disease without esophagitis   • Cataract   • Pulmonary hypertension (CMS/HCC)   • Diastolic dysfunction   • HUGO (obstructive sleep apnea)   • Trifascicular block   • Leukopenia   • MGUS (monoclonal gammopathy of unknown significance)   • 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CMS/[facility])   • Other chest pain   • Lichen sclerosus   • Heart block   • Sleep-related hypoxia   • Bradycardia   • Shoulder arthritis   • History of atrial fibrillation   • Pacemaker   • Paroxysmal SVT (supraventricular tachycardia) (CMS/[facility])   • History of chest pain   • Palpitations   • Atrial fibrillation, persistent (CMS/HCC)   • Rectal bleeding   • Arthritis   • Ascending aortic aneurysm (CMS/HCC)   • Mediastinal lymphadenopathy   • Immobility   • Left knee pain   • Chronic anticoagulation   • Hemarthrosis of left knee   • Anemia   • Obesity (BMI 30-39.9)   • Generalized weakness   • Acute UTI (urinary tract infection)     Past Medical History:   Diagnosis Date   • Acute UTI (urinary tract infection) 2021   • Anemia    • Arrhythmia    • Arthritis    • Asthma    • Bradycardia    • Chest pain    • Colitis    • COPD (chronic obstructive pulmonary disease) (CMS/[facility])    • Diastolic dysfunction    • Essential hypertension 2016   • GERD (gastroesophageal reflux disease)    • Heart block    • Hypertension    • Hypertensive heart disease    • Hyperthyroidism    • Kidney stone    • Leukopenia    • Low back pain    • MGUS (monoclonal gammopathy of unknown significance)    •  Nephrolithiasis    • Obesity    • Paroxysmal atrial fibrillation (CMS/HCC)    • Peptic ulceration    • PSVT (paroxysmal supraventricular tachycardia) (CMS/HCC)    • Pulmonary hypertension (CMS/HCC)    • Rectal bleed    • Sleep apnea    • Trifascicular block    • 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)    • Ventricular tachycardia (CMS/HCC)     nonsustained   • Vertigo      Past Surgical History:   Procedure Laterality Date   • BACK SURGERY      lumbar fusion   • CARDIAC ELECTROPHYSIOLOGY PROCEDURE N/A 11/7/2018    Procedure: Pacemaker DC new   [place];  Surgeon: Jesus Granda MD;  Location: [facility] CATH INVASIVE LOCATION;  Service: Cardiology   • CHOLECYSTECTOMY     • COLONOSCOPY  06/16/2014    colitis, cryptitis,  tics, NBIH, TA w/low grade dysplasia   • COLONOSCOPY N/A 10/28/2019    Procedure: COLONOSCOPY WITH COLD AND HOT POLYPECTOMIES;  Surgeon: Micaela English MD;  Location: [facility] ENDOSCOPY;  Service: Gastroenterology   • HEMORRHOIDECTOMY     • HYSTERECTOMY     • KNEE ARTHROPLASTY     • MYOMECTOMY     • SHOULDER SURGERY     • SINUS SURGERY     • TOE NAIL AMPUTATION  03/04/2019   • TONSILLECTOMY       General Information     Row Name 04/09/21 1001          Physical Therapy Time and Intention    Document Type  therapy note (daily note)  -     Mode of Treatment  physical therapy  -     Row Name 04/09/21 1001          General Information    Existing Precautions/Restrictions  fall;oxygen therapy device and L/min  -     Row Name 04/09/21 1001          Cognition    Orientation Status (Cognition)  oriented x 3  -       User Key  (r) = Recorded By, (t) = Taken By, (c) = Cosigned By    Initials Name Provider Type     Desire Matthews PTA Physical Therapy Assistant        Mobility     Row Name 04/09/21 1002          Bed Mobility    Bed Mobility  supine-sit;sit-supine  -     Supine-Sit [place] (Bed Mobility)  contact guard  -     Sit-Supine [place] (Bed Mobility)   maximum assist (25% patient effort);2 person assist  -     Assistive Device (Bed Mobility)  bed rails;head of bed elevated  -SM     Row Name 04/09/21 1002          Transfers    Comment (Transfers)  stood 1x, maintaining standing for ~3min, then took 2 side steps towards HOB  -SM     Row Name 04/09/21 1002          Sit-Stand Transfer    Sit-Stand [place] (Transfers)  minimum assist (75% patient effort);2 person assist  -     Assistive Device (Sit-Stand Transfers)  walker, front-wheeled  -SM     Row Name 04/09/21 1002          Gait/Stairs (Locomotion)    [place] Level (Gait)  minimum assist (75% patient effort);2 person assist  -     Assistive Device (Gait)  walker, front-wheeled  -     Distance in Feet (Gait)  2 side steps towards [facility]       User Key  (r) = Recorded By, (t) = Taken By, (c) = Cosigned By    Initials Name Provider Type    Desire Falcon PTA Physical Therapy Assistant        Obj/Interventions     Row Name 04/09/21 1003          Motor Skills    Therapeutic Exercise  -- seated B AP, LAQ x10 reps  -       User Key  (r) = Recorded By, (t) = Taken By, (c) = Cosigned By    Initials Name Provider Type    Desire Falcon PTA Physical Therapy Assistant        Goals/Plan    No documentation.       Clinical Impression     Row Name 04/09/21 1003          Pain    Additional Documentation  Pain Scale: Numbers Pre/Post-Treatment (Group)  -SM     Row Name 04/09/21 1003          Pain Scale: Numbers Pre/Post-Treatment    Pretreatment Pain Rating  3/10  -     Posttreatment Pain Rating  3/10  -     Pain Location  back  -     Pain Intervention(s)  Repositioned;Ambulation/increased activity;Rest  -SM     Row Name 04/09/21 1003          Positioning and Restraints    Pre-Treatment Position  in bed  -     Post Treatment Position  bed  -     In Bed  supine;call light within reach;encouraged to call for assist;exit alarm on  -       User Key  (r) = Recorded By, (t) = Taken By,  (c) = Cosigned By    Initials Name Provider Type    Desire Falcon PTA Physical Therapy Assistant        Outcome Measures     Row Name 04/09/21 1004          How much help from another person do you currently need...    Turning from your back to your side while in flat bed without using bedrails?  3  -SM     Moving from lying on back to sitting on the side of a flat bed without bedrails?  3  -SM     Moving to and from a bed to a chair (including a wheelchair)?  2  -SM     Standing up from a chair using your arms (e.g., wheelchair, bedside chair)?  3  -SM     Climbing 3-5 steps with a railing?  1  -SM     To walk in hospital room?  2  -SM     AM-PAC 6 Clicks Score (PT)  14  -     Row Name 04/09/21 1004          Functional Assessment    Outcome Measure Options  AM-PAC 6 Clicks Basic Mobility (PT)  -       User Key  (r) = Recorded By, (t) = Taken By, (c) = Cosigned By    Initials Name Provider Type    Desire Falcon PTA Physical Therapy Assistant        Physical Therapy Education                 Title: PT OT SLP Therapies (In Progress)     Topic: Physical Therapy (Done)     Point: Mobility training (Done)     Learning Progress Summary           Patient Acceptance, E,TB,D, VU,NR by  at 4/9/2021 1005    Acceptance, E,TB,D, VU,NR by  at 4/8/2021 1628                   Point: Home exercise program (Done)     Learning Progress Summary           Patient Acceptance, E,TB,D, VU,NR by  at 4/9/2021 1005    Acceptance, E,TB,D, VU,NR by  at 4/8/2021 1628                   Point: Body mechanics (Done)     Learning Progress Summary           Patient Acceptance, E,TB,D, VU,NR by  at 4/9/2021 1005    Acceptance, E,TB,D, VU,NR by CB at 4/8/2021 1628                   Point: Precautions (Done)     Learning Progress Summary           Patient Acceptance, E,TB,D, VU,NR by  at 4/9/2021 1005    Acceptance, E,TB,D, VU,NR by CB at 4/8/2021 1628                               User Key     Initials Effective  Dates Name Provider Type Discipline     03/07/18 -  Desire Matthews PTA Physical Therapy Assistant PT    CB 12/30/20 -  Luzma Ramírez Physical Therapist PT              PT Recommendation and Plan     Plan of Care Reviewed With: patient  Progress: improving  Outcome Summary: Pt tolerated treatment well this date. Much improved success w/ mobility today. Required CGA to transition from supine to sit, then stood and took 2 side steps towards HOB w/ min A x2 and Rw. Pt had c/o dizziness initially while seated on EOB, though improved slightly after sitting for several minutes. Performed a few seated LE exercises while on EOB. Plan to transfer/ambulate to chair tomorrow. Patient was not wearing a face mask during this therapy encounter. Therapist used appropriate personal protective equipment including eye protection, mask, and gloves.  Mask used was standard procedure mask. Appropriate PPE was worn during the entire therapy session. Hand hygiene was completed before and after therapy session. Patient is not in enhanced droplet precautions.     Time Calculation:   PT Charges     Row Name 04/09/21 1008             Time Calculation    Start Time  0824  -      Stop Time  0856  -      Time Calculation (min)  32 min  -      PT Received On  04/09/21  -      PT - Next Appointment  04/10/21  -        User Key  (r) = Recorded By, (t) = Taken By, (c) = Cosigned By    Initials Name Provider Type     Desire Matthews PTA Physical Therapy Assistant        Therapy Charges for Today     Code Description Service Date Service Provider Modifiers Qty    25397414132 HC PT THER PROC EA 15 MIN 4/9/2021 Desire Matthews, KEANU GP 1    58101357597 HC PT THERAPEUTIC ACT EA 15 MIN 4/9/2021 Desire Matthews PTA GP 1    79659047852 HC PT THER SUPP EA 15 MIN 4/9/2021 Desire Matthews PTA GP 1          PT G-Codes  Outcome Measure Options: AM-PAC 6 Clicks Basic Mobility (PT)  AM-PAC 6 Clicks Score (PT): 14    Desire  Randi Matthews, PTA  4/9/2021

## 2021-04-09 NOTE — PLAN OF CARE
Problem: Adult Inpatient Plan of Care  Goal: Plan of Care Review  Outcome: Ongoing, Progressing  Flowsheets (Taken 4/9/2021 8096)  Progress: no change  Plan of Care Reviewed With: patient  Outcome Summary: Patient complained of back pain, relieved with tylenol PRN. Patient rested most of the night. Purewick in place, good urine output. A&Ox4. VSS. Will continue to monitor.

## 2021-04-09 NOTE — PLAN OF CARE
Goal Outcome Evaluation:  Plan of Care Reviewed With: patient  Progress: improving  Outcome Summary: Pt tolerated treatment well this date. Much improved success w/ mobility today. Required CGA to transition from supine to sit, then stood and took 2 side steps towards HOB w/ min A x2 and Rw. Pt had c/o dizziness initially while seated on EOB, though improved slightly after sitting for several minutes. Performed a few seated LE exercises while on EOB. Plan to transfer/ambulate to chair tomorrow. Patient was not wearing a face mask during this therapy encounter. Therapist used appropriate personal protective equipment including eye protection, mask, and gloves.  Mask used was standard procedure mask. Appropriate PPE was worn during the entire therapy session. Hand hygiene was completed before and after therapy session. Patient is not in enhanced droplet precautions.

## 2021-04-09 NOTE — PROGRESS NOTES
Name: Brittany Villeda ADMIT: 2021   : 1935  PCP: Mega Esparza MD    MRN: [id number] LOS: 0 days   AGE/SEX: 86 y.o. female  ROOM: [facility]     Subjective   Subjective   Resting in bed. Worked with PT. States she stood and took about 4 steps. Denies any chest pain or dyspnea. No cough/fever/chills. No nausea or vomiting. Did have some breakfast. Nursing notes indicate low back pain. Note of bilateral groin pain yesterday with PT.     Objective   Objective   Vital Signs  Temp:  [97.4 °F (36.3 °C)-97.8 °F (36.6 °C)] 97.4 °F (36.3 °C)  Heart Rate:  [70-76] 76  Resp:  [16] 16  BP: ()/(41-80) 123/69  SpO2:  [98 %-99 %] 98 %  on  Flow (L/min):  [2] 2;   Device (Oxygen Therapy): nasal cannula  Body mass index is 36.9 kg/m².     Physical Exam  Vitals and nursing note reviewed.   Constitutional:       General: She is not in acute distress.     Appearance: She is obese. She is not toxic-appearing.   Cardiovascular:      Rate and Rhythm: Normal rate. Rhythm regular and pacing on monitor.     Pulses: Normal pulses.      Heart sounds: Normal heart sounds.   Pulmonary:      Effort: Pulmonary effort is normal. No respiratory distress.      Breath sounds: Normal breath sounds. Wearing 2 L oxygen saturating 100%  Abdominal:      General: Bowel sounds are normal. There is no distension.      Palpations: Abdomen is soft.      Tenderness: There is no abdominal tenderness.   Musculoskeletal:         General: Swelling (trace BLE) present. Normal range of motion.      Cervical back: Normal range of motion and neck supple.   Skin:     General: Skin is warm and dry.      Findings: No bruising.   Neurological:      Mental Status: She is alert and oriented to person, place, and time.      Sensory: No sensory deficit.      Motor: Weakness (generalized) present.      Coordination: Coordination normal.   Psychiatric:         Mood and Affect: Mood normal.         Behavior: Behavior normal.     Results Review:       I  reviewed the patient's new clinical results.  Results from last 7 days   Lab Units 04/09/21  0725 04/08/21  0912 04/07/21  2335   WBC 10*3/mm3 3.30* 3.01* 3.64   HEMOGLOBIN g/dL 12.9 13.2 12.2   PLATELETS 10*3/mm3 152 142 163     Results from last 7 days   Lab Units 04/09/21  0725 04/08/21  0548 04/07/21  2335   SODIUM mmol/L 138 140 139   POTASSIUM mmol/L 4.3 4.7 4.4   CHLORIDE mmol/L 105 112* 106   CO2 mmol/L 26.8 21.0* 24.6   BUN mg/dL 10 9 12   CREATININE mg/dL 0.65 0.52* 0.68   GLUCOSE mg/dL 85 76 108*   Estimated Creatinine Clearance: 57.2 mL/min (by C-G formula based on SCr of 0.65 mg/dL).  Results from last 7 days   Lab Units 04/07/21  2335   ALBUMIN g/dL 3.30*   BILIRUBIN mg/dL 0.6   ALK PHOS U/L 93   AST (SGOT) U/L 16   ALT (SGPT) U/L 9     Results from last 7 days   Lab Units 04/09/21  0725 04/08/21  0548 04/07/21  2335   CALCIUM mg/dL 10.2 8.4* 9.7   ALBUMIN g/dL  --   --  3.30*     Results from last 7 days   Lab Units 04/08/21  0548   PROCALCITONIN ng/mL 0.05     No results found for: HGBA1C, POCGLU    calcium-vitamin D, 500 mg, Oral, BID  cefTRIAXone, 1 g, Intravenous, Q24H  docusate sodium, 100 mg, Oral, Nightly  mesalamine, 1.5 g, Oral, Daily  pantoprazole, 40 mg, Oral, QAM  sodium chloride, 10 mL, Intravenous, Q12H  vitamin B-12, 1,000 mcg, Oral, Daily  warfarin, 2.5 mg, Oral, Once per day on Sun Tue Wed Fri  warfarin, 5 mg, Oral, Once per day on Mon Thu Sat  wheat dextrin, 1 each, Oral, Daily      Pharmacy to dose warfarin,     Diet Regular; Cardiac       Assessment/Plan     Active Hospital Problems    Diagnosis  POA   • **Generalized weakness [R53.1]  Yes   • Acute UTI (urinary tract infection) [N39.0]  Yes   • Chronic anticoagulation [Z79.01]  Not Applicable   • Immobility [Z74.09]  Yes   • Obesity (BMI 30-39.9) [E66.9]  Yes   • Atrial fibrillation, persistent (CMS/HCC) [I48.19]  Yes   • 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]  Yes   • MGUS (monoclonal gammopathy of  unknown significance) [D47.2]  Yes   • HUGO (obstructive sleep apnea) [G47.33]  Yes   • Pulmonary hypertension (CMS/HCC) [I27.20]  Yes   • Diastolic dysfunction [I51.89]  Yes   • Essential hypertension [I10]  Yes      Resolved Hospital Problems   No resolved problems to display.     Ms. Villeda is a 86 year old female who presented to the hospital with complaints of weakness and shortness of breath.     · Generalized weakness/immobility: Suspect this is multifactorial. CT head was negative and she is has no gross neurological deficits. Neurology consulted. Consider CT imaging of lumbar spine pending their input given back pain/groin pain. No MRI related to PPM. UTI present and being treated with abx at this time. PT/OT following.  · Acute UTI: Cover with Rocephin. Culture pending.  · Atrial fibrillation/chronic A/C: Continue warfarin. Monitor PT/INR. Rate controlled and paced.   · Diastolic dysfunction/pulmonary HTN: BNP normal and CXR without congestion. She does not appear overtly overloaded. Echocardiogram ordered. Will consult Cardiology if new findings. She sees Dr. Culp.  · HTN: BP stable. Does not appear to have any agents for BP.  · HUGO: Wean oxygen with sleep to maintain saturations > 90%.   · UC: Continue mesalamine. Monitor stools.      I discussed the patients findings and my recommendations with patient and nursing staff.     VTE Prophylaxis - Warfarin (home med).  Code Status - No CPR. Discussed with patient at bedside.   Disposition - Anticipate discharge TBD. Likely home with HH as family/patient do not want SNF.      SHAHID Nowak  [facility]  04/09/21  09:30 EDT

## 2021-04-10 LAB
ALBUMIN SERPL-MCNC: 3 G/DL (ref 3.5–5.2)
ALBUMIN/GLOB SERPL: 0.9 G/DL
ALP SERPL-CCNC: 67 U/L (ref 39–117)
ALT SERPL W P-5'-P-CCNC: 8 U/L (ref 1–33)
ANION GAP SERPL CALCULATED.3IONS-SCNC: 6.8 MMOL/L (ref 5–15)
AST SERPL-CCNC: 16 U/L (ref 1–32)
BACTERIA SPEC AEROBE CULT: ABNORMAL
BILIRUB SERPL-MCNC: 1.7 MG/DL (ref 0–1.2)
BUN SERPL-MCNC: 11 MG/DL (ref 8–23)
BUN/CREAT SERPL: 17.5 (ref 7–25)
CALCIUM SPEC-SCNC: 10.4 MG/DL (ref 8.6–10.5)
CHLORIDE SERPL-SCNC: 106 MMOL/L (ref 98–107)
CK SERPL-CCNC: 194 U/L (ref 20–180)
CO2 SERPL-SCNC: 26.2 MMOL/L (ref 22–29)
CREAT SERPL-MCNC: 0.63 MG/DL (ref 0.57–1)
DEPRECATED RDW RBC AUTO: 45.2 FL (ref 37–54)
ERYTHROCYTE [DISTWIDTH] IN BLOOD BY AUTOMATED COUNT: 12.7 % (ref 12.3–15.4)
GFR SERPL CREATININE-BSD FRML MDRD: 109 ML/MIN/1.73
GLOBULIN UR ELPH-MCNC: 3.4 GM/DL
GLUCOSE SERPL-MCNC: 82 MG/DL (ref 65–99)
HCT VFR BLD AUTO: 38.1 % (ref 34–46.6)
HGB BLD-MCNC: 12.5 G/DL (ref 12–15.9)
INR PPP: 2.25 (ref 0.9–1.1)
MCH RBC QN AUTO: 32.3 PG (ref 26.6–33)
MCHC RBC AUTO-ENTMCNC: 32.8 G/DL (ref 31.5–35.7)
MCV RBC AUTO: 98.4 FL (ref 79–97)
PLATELET # BLD AUTO: 144 10*3/MM3 (ref 140–450)
PMV BLD AUTO: 9.5 FL (ref 6–12)
POTASSIUM SERPL-SCNC: 4.3 MMOL/L (ref 3.5–5.2)
PROT SERPL-MCNC: 6.4 G/DL (ref 6–8.5)
PROTHROMBIN TIME: 24.6 SECONDS (ref 11.7–14.2)
RBC # BLD AUTO: 3.87 10*6/MM3 (ref 3.77–5.28)
SODIUM SERPL-SCNC: 139 MMOL/L (ref 136–145)
WBC # BLD AUTO: 3.73 10*3/MM3 (ref 3.4–10.8)

## 2021-04-10 PROCEDURE — 82550 ASSAY OF CK (CPK): CPT | Performed by: PSYCHIATRY & NEUROLOGY

## 2021-04-10 PROCEDURE — 99212 OFFICE O/P EST SF 10 MIN: CPT | Performed by: PSYCHIATRY & NEUROLOGY

## 2021-04-10 PROCEDURE — 80053 COMPREHEN METABOLIC PANEL: CPT | Performed by: NURSE PRACTITIONER

## 2021-04-10 PROCEDURE — 99214 OFFICE O/P EST MOD 30 MIN: CPT | Performed by: INTERNAL MEDICINE

## 2021-04-10 PROCEDURE — G0378 HOSPITAL OBSERVATION PER HR: HCPCS

## 2021-04-10 PROCEDURE — 36415 COLL VENOUS BLD VENIPUNCTURE: CPT | Performed by: INTERNAL MEDICINE

## 2021-04-10 PROCEDURE — 85027 COMPLETE CBC AUTOMATED: CPT | Performed by: NURSE PRACTITIONER

## 2021-04-10 PROCEDURE — 25010000002 CEFTRIAXONE PER 250 MG: Performed by: INTERNAL MEDICINE

## 2021-04-10 PROCEDURE — 85610 PROTHROMBIN TIME: CPT | Performed by: INTERNAL MEDICINE

## 2021-04-10 PROCEDURE — 97110 THERAPEUTIC EXERCISES: CPT

## 2021-04-10 RX ORDER — LORAZEPAM 2 MG/ML
1 INJECTION INTRAMUSCULAR
Status: ACTIVE | OUTPATIENT
Start: 2021-04-11 | End: 2021-04-11

## 2021-04-10 RX ORDER — DOXYCYCLINE 100 MG/1
100 CAPSULE ORAL EVERY 12 HOURS SCHEDULED
Status: COMPLETED | OUTPATIENT
Start: 2021-04-10 | End: 2021-04-14

## 2021-04-10 RX ADMIN — DOXYCYCLINE 100 MG: 100 CAPSULE ORAL at 20:41

## 2021-04-10 RX ADMIN — PANTOPRAZOLE SODIUM 40 MG: 40 TABLET, DELAYED RELEASE ORAL at 06:48

## 2021-04-10 RX ADMIN — DOXYCYCLINE 100 MG: 100 CAPSULE ORAL at 15:45

## 2021-04-10 RX ADMIN — WARFARIN 5 MG: 5 TABLET ORAL at 20:42

## 2021-04-10 RX ADMIN — CALCIUM CARBONATE-VITAMIN D TAB 500 MG-200 UNIT 500 MG: 500-200 TAB at 09:39

## 2021-04-10 RX ADMIN — Medication 1 EACH: at 09:39

## 2021-04-10 RX ADMIN — SODIUM CHLORIDE, PRESERVATIVE FREE 10 ML: 5 INJECTION INTRAVENOUS at 20:41

## 2021-04-10 RX ADMIN — CEFTRIAXONE SODIUM 1 G: 1 INJECTION, SOLUTION INTRAVENOUS at 09:48

## 2021-04-10 RX ADMIN — MESALAMINE 1.5 G: 0.38 CAPSULE, EXTENDED RELEASE ORAL at 09:38

## 2021-04-10 RX ADMIN — ACETAMINOPHEN 650 MG: 325 TABLET ORAL at 20:41

## 2021-04-10 RX ADMIN — Medication 1000 MCG: at 09:38

## 2021-04-10 RX ADMIN — CALCIUM CARBONATE-VITAMIN D TAB 500 MG-200 UNIT 500 MG: 500-200 TAB at 20:41

## 2021-04-10 RX ADMIN — SODIUM CHLORIDE, PRESERVATIVE FREE 10 ML: 5 INJECTION INTRAVENOUS at 09:39

## 2021-04-10 NOTE — PLAN OF CARE
Goal Outcome Evaluation:  Plan of Care Reviewed With: patient  Progress: improving  Outcome Summary: Pt agreeable to therapy this date. Pt performed bed mob with modA x2 and stand pivot transfer to the chair with mod/maxA x2 and HHA. Pt continues to benefit from PT to improve function.  Patient was intermittently wearing a face mask during this therapy encounter. Therapist used appropriate personal protective equipment including eye protection, mask, and gloves.  Mask used was standard procedure mask. Appropriate PPE was worn during the entire therapy session. Hand hygiene was completed before and after therapy session. Patient is not in enhanced droplet precautions.   PT Enedina Lee

## 2021-04-10 NOTE — PROGRESS NOTES
Patient Identification:  NAME:  Brittany Villeda  Age:  86 y.o.   Sex:  female   :  1935   MRN:  [id number]       Chief complaint: Leg weakness, back pain    History of present illness: She states she just does not feel too good today a little depressed and run down but otherwise feels about the same some back pain and has the leg weakness.      Past medical history:  Past Medical History:   Diagnosis Date   • Acute UTI (urinary tract infection) 2021   • Anemia    • Arrhythmia    • Arthritis    • Asthma    • Bradycardia    • Chest pain    • Colitis    • COPD (chronic obstructive pulmonary disease) (CMS/[facility])    • Diastolic dysfunction    • Essential hypertension 2016   • GERD (gastroesophageal reflux disease)    • Heart block    • Hypertension    • Hypertensive heart disease    • Hyperthyroidism    • Kidney stone    • Leukopenia    • Low back pain    • MGUS (monoclonal gammopathy of unknown significance)    • Nephrolithiasis    • Obesity    • Paroxysmal atrial fibrillation (CMS/[facility])    • Peptic ulceration    • PSVT (paroxysmal supraventricular tachycardia) (CMS/[facility])    • Pulmonary hypertension (CMS/[facility])    • Rectal bleed    • Sleep apnea    • Trifascicular block    • 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CMS/[facility])    • Ventricular tachycardia (CMS/[facility])     nonsustained   • Vertigo        Allergies:  Codeine, Amitriptyline, Amoxicillin-pot clavulanate, Aspirin, Bactrim [sulfamethoxazole-trimethoprim], Carisoprodol-aspirin-codeine, Iodinated diagnostic agents, Latex, Naproxen, Nsaids, Soma compound with codeine [carisoprodol-aspirin-codeine], Sulfa antibiotics, and Tramadol    Home medications:  Medications Prior to Admission   Medication Sig Dispense Refill Last Dose   • acetaminophen (TYLENOL) 500 MG tablet Take 1 tablet by mouth Every 6 (Six) Hours As Needed for Mild Pain .      • albuterol sulfate  (90 Base) MCG/ACT inhaler Inhale 2 puffs Every 6 (Six) Hours As Needed for  Wheezing. 1 inhaler 0    • calcium carbonate-cholecalciferol 500-400 MG-UNIT tablet tablet Take 1 tablet by mouth 2 (two) times a day.      • docusate sodium (COLACE) 100 MG capsule Take 100 mg by mouth Every Night.      • mesalamine (APRISO) 0.375 g 24 hr capsule TAKE 4 CAPSULES DAILY 360 capsule 0    • omeprazole (priLOSEC) 20 MG capsule TAKE 1 CAPSULE EVERY DAY 90 capsule 0    • vitamin B-12 (CYANOCOBALAMIN) 1000 MCG tablet Take 1,000 mcg by mouth Daily.      • warfarin (COUMADIN) 1 MG tablet Take 5 mg by mouth Daily.      • Wheat Dextrin (BENEFIBER DRINK MIX PO) Take  by mouth Every 2 (Two) Hours As Needed.           Hospital medications:  calcium-vitamin D, 500 mg, Oral, BID  cefTRIAXone, 1 g, Intravenous, Q24H  docusate sodium, 100 mg, Oral, Nightly  mesalamine, 1.5 g, Oral, Daily  pantoprazole, 40 mg, Oral, QAM  sodium chloride, 10 mL, Intravenous, Q12H  vitamin B-12, 1,000 mcg, Oral, Daily  warfarin, 2.5 mg, Oral, Once per day on Sun Tue Wed Fri  warfarin, 5 mg, Oral, Once per day on Mon Thu Sat  wheat dextrin, 1 each, Oral, Daily      Pharmacy to dose warfarin,       •  acetaminophen **OR** acetaminophen **OR** acetaminophen  •  albuterol sulfate HFA  •  calcium carbonate  •  ondansetron **OR** ondansetron  •  Pharmacy to dose warfarin  •  sodium chloride      Objective:  Vitals Ranges:   Temp:  [97.9 °F (36.6 °C)-98.4 °F (36.9 °C)] 98.2 °F (36.8 °C)  Heart Rate:  [70-78] 70  Resp:  [18] 18  BP: (111-119)/(59-70) 119/67      Physical Exam:  Patient is awake alert normal cranial nerves II through VII she still cannot raise either arm above her shoulder she does have some rotator cuff problem but I think there is proximal weakness in the upper extremities her  strength is good lower extremities although she is obese I think she has some quadriceps wasting underneath the adipose tissue, definite proximal weakness can barely bend the legs or move the legs proximally or lift them but has excellent distal  strength dorsi and plantar flexion reflexes trace throughout symmetrical trace or absent at the knees toes definitely downgoing bilaterally no loss of light touch distally no sensory level    Results review:   I reviewed the patient's new clinical results.    Data review:  Lab Results (last 24 hours)     Procedure Component Value Units Date/Time    Comprehensive Metabolic Panel [[id number]]  (Abnormal) Collected: 04/10/21 0642    Specimen: Blood Updated: 04/10/21 0756     Glucose 82 mg/dL      BUN 11 mg/dL      Creatinine 0.63 mg/dL      Sodium 139 mmol/L      Potassium 4.3 mmol/L      Chloride 106 mmol/L      CO2 26.2 mmol/L      Calcium 10.4 mg/dL      Total Protein 6.4 g/dL      Albumin 3.00 g/dL      ALT (SGPT) 8 U/L      AST (SGOT) 16 U/L      Alkaline Phosphatase 67 U/L      Total Bilirubin 1.7 mg/dL      eGFR  African Amer 109 mL/min/1.73      Globulin 3.4 gm/dL      A/G Ratio 0.9 g/dL      BUN/Creatinine Ratio 17.5     Anion Gap 6.8 mmol/L     Narrative:      GFR Normal >60  Chronic Kidney Disease <60  Kidney Failure <15      CK [[id number]]  (Abnormal) Collected: 04/10/21 0642    Specimen: Blood Updated: 04/10/21 0756     Creatine Kinase 194 U/L     Protime-INR [[id number]]  (Abnormal) Collected: 04/10/21 0642    Specimen: Blood Updated: 04/10/21 0750     Protime 24.6 Seconds      INR 2.25    CBC (No Diff) [[id number]]  (Abnormal) Collected: 04/10/21 0642    Specimen: Blood Updated: 04/10/21 0721     WBC 3.73 10*3/mm3      RBC 3.87 10*6/mm3      Hemoglobin 12.5 g/dL      Hematocrit 38.1 %      MCV 98.4 fL      MCH 32.3 pg      MCHC 32.8 g/dL      RDW 12.7 %      RDW-SD 45.2 fl      MPV 9.5 fL      Platelets 144 10*3/mm3     Urine Culture - Urine, Urine, Catheter [[id number]]  (Abnormal)  (Susceptibility) Collected: 04/08/21 0107    Specimen: Urine, Catheter Updated: 04/10/21 0214     Urine Culture >100,000 CFU/mL Escherichia coli    Susceptibility      Escherichia coli      CARLOS      Ampicillin Susceptible       Ampicillin + Sulbactam Susceptible      Cefazolin Susceptible      Cefepime Susceptible      Ceftazidime Susceptible      Ceftriaxone Susceptible      Gentamicin Susceptible      Levofloxacin Susceptible      Nitrofurantoin Susceptible      Piperacillin + Tazobactam Susceptible      Tetracycline Susceptible      Trimethoprim + Sulfamethoxazole Susceptible               Linear View                          Imaging:  Imaging Results (Last 24 Hours)     ** No results found for the last 24 hours. **         PPE worn at all times washed before washed afterwards disposed of everything properly was not within 6 feet of her for more than few minutes during exam no aerosols used at any point    Assessment and Plan:     This patient has proximal more so than distal leg weakness.  There is I believe some atrophy of the muscles in the proximal legs, although she is pretty obese, which could also imply a proximal myopathy.  Although she has had shoulder problems she definitely has some weakness lifting her arms as well.  We are going to try and get an MRI of the both sacral spine if she gets cleared by cardiology.  She apparently has a [employer] iZettle pacemaker that will be MRI compatible.  She does complain of low back pain that hurts into the groin so this could be consistent with radiculopathy or spinal stenosis at the L2-3-4 level without causing distal leg weakness entheses dorsi and plantar flexion is excellent and the toes are downgoing).  So, we will try the MRI of the back and go from there.  Note her CPK is within normal limits, only slightly elevated.  This is not going to be acute polymyositis.  Likewise there is no report of her being on steroids long-term which could cause this phenomena as well.      Jp Daniels MD  04/10/21  12:38 EDT

## 2021-04-10 NOTE — PROGRESS NOTES
Pharmacy Consult: Warfarin Dosing/ Monitoring    Brittany Villeda is a 86 y.o. female, estimated creatinine clearance is 57.2 mL/min (by C-G formula based on SCr of 0.65 mg/dL). weighing 97.5 kg (215 lb).     has a past medical history of Acute UTI (urinary tract infection) (2021), Anemia, Arrhythmia, Arthritis, Asthma, Bradycardia, Chest pain, Colitis, COPD (chronic obstructive pulmonary disease) (CMS/[facility]), Diastolic dysfunction, Essential hypertension (2016), GERD (gastroesophageal reflux disease), Heart block, Hypertension, Hypertensive heart disease, Hyperthyroidism, Kidney stone, Leukopenia, Low back pain, MGUS (monoclonal gammopathy of unknown significance), Nephrolithiasis, Obesity, Paroxysmal atrial fibrillation (CMS/[facility]), Peptic ulceration, PSVT (paroxysmal supraventricular tachycardia) (CMS/[facility]), Pulmonary hypertension (CMS/[facility]), Rectal bleed, Sleep apnea, Trifascicular block, 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CMS/[facility]), Ventricular tachycardia (CMS/[facility]), and Vertigo.    Social History     Tobacco Use    Smoking status: Former Smoker     Packs/day: 1.50     Years: 10.00     Pack years: 15.00     Start date:      Quit date:      Years since quittin.3    Smokeless tobacco: Never Used    Tobacco comment: QUIT SMOKING    Substance Use Topics    Alcohol use: No     Comment: Daily caffeine use - one cup of coffee    Drug use: Defer       Results from last 7 days   Lab Units 04/10/21  0642 21  0725 21  0912 21  2335 21  0000   INR  2.25* 2.21*  --  2.15* 2.40   HEMOGLOBIN g/dL 12.5 12.9 13.2 12.2  --    HEMATOCRIT % 38.1 38.3 38.5 37.7  --    PLATELETS 10*3/mm3 144 152 142 163  --      Results from last 7 days   Lab Units 04/10/21  0642 21  0725 21  0548 21  2335   SODIUM mmol/L 139 138 140 139   POTASSIUM mmol/L 4.3 4.3 4.7 4.4   CHLORIDE mmol/L 106 105 112* 106   CO2 mmol/L 26.2 26.8 21.0* 24.6   BUN mg/dL 11 10 9 12    CREATININE mg/dL 0.63 0.65 0.52* 0.68   CALCIUM mg/dL 10.4 10.2 8.4* 9.7   BILIRUBIN mg/dL 1.7*  --   --  0.6   ALK PHOS U/L 67  --   --  93   ALT (SGPT) U/L 8  --   --  9   AST (SGOT) U/L 16  --   --  16   GLUCOSE mg/dL 82 85 76 108*     Anticoagulation history: Warfarin therapy is managed outpatient by the medication management clinic. Patient takes 5 mg Mon,Thu,Sat and 2.5 mg all other days    Hospital Anticoagulation:  Consulting provider: SHAHID Randall  Start date: 4/8  Indication: Atrial fibrillation  Target INR: 2.0-3.0  Expected duration: Indefinite   Bridge Therapy: No                  Date 4/8 4/9  4/10          INR 2.15 2.21  2.25          Warfarin dose 5 mg 2.5mg  5 mg            Potential drug interactions:  Ceftriaxone - may enhance the effect of warfarin    Relevant nutrition status: Regular cardiac diet    Other: None    Education complete?/ Date: hospitals    Assessment/Plan:  INR in target today at 2.25 therefore will continue outpatient warfarin regimen as above in anticoagulation history....patient will receive 5.0mg of warfarin today.  Monitor for signs/symptoms of bleeding  Follow up INR tomorrow    Pharmacy will continue to follow until discharge or discontinuation of warfarin.       Janice Alonso, [facility]

## 2021-04-10 NOTE — PROGRESS NOTES
Name: Brittany Villeda ADMIT: 2021   : 1935  PCP: Mega Esparza MD    MRN: [id number] LOS: 0 days   AGE/SEX: 86 y.o. female  ROOM: [facility]     Subjective   Subjective   Continues to complain of weakness of both thigh muscles bilaterally.  No double vision.  No loss of the vision.  No slurring of the speech.  No unilateral numbness or weakness.  No loss of consciousness.  No dizziness.  No joint pain or swelling.    Review of Systems  Cardiovascular/respiratory.  No chest pain.  Positive old shortness of breath.  No palpitation.  GI.  No abdominal pain or nausea or vomiting.  .  No dysuria or hematuria.  No incontinence.     Objective   Objective   Vital Signs  Temp:  [97.9 °F (36.6 °C)-98.4 °F (36.9 °C)] 98.2 °F (36.8 °C)  Heart Rate:  [70-78] 70  Resp:  [18] 18  BP: (111-119)/(59-70) 119/67  SpO2:  [96 %-99 %] 97 %  on   ;   Device (Oxygen Therapy): room air    Intake/Output Summary (Last 24 hours) at 4/10/2021 1302  Last data filed at 4/10/2021 0900  Gross per 24 hour   Intake 820 ml   Output --   Net 820 ml     Body mass index is 36.9 kg/m².      21  0145 21  1106   Weight: 97.5 kg (215 lb) 97.5 kg (215 lb)     Physical Exam  General.  Elderly female.  Alert oriented x3 no apparent pain distress or diaphoresis normal mood and affect  Eyes.  Pupils equal round and reactive intact extraocular musculature no pallor or jaundice  Neck.  Supple no JVD no lymphadenopathy no thyromegaly  Cardiovascular.  Regular rate and rhythm with occasional ectopic beats.  Chest.  Clear to auscultation bilaterally with no added sounds  Abdomen.  Soft lax no tenderness no organomegaly no guarding or  Extremities.  No clubbing cyanosis or edema  CNS.  Proximal muscle weakness of both lower extremities and both upper extremities more so on the lower extremities than the upper otherwise negative    Results Review:      Results from last 7 days   Lab Units 04/10/21  0642 21  0725 21  0527  04/07/21  2335   SODIUM mmol/L 139 138 140 139   POTASSIUM mmol/L 4.3 4.3 4.7 4.4   CHLORIDE mmol/L 106 105 112* 106   CO2 mmol/L 26.2 26.8 21.0* 24.6   BUN mg/dL 11 10 9 12   CREATININE mg/dL 0.63 0.65 0.52* 0.68   GLUCOSE mg/dL 82 85 76 108*   CALCIUM mg/dL 10.4 10.2 8.4* 9.7   AST (SGOT) U/L 16  --   --  16   ALT (SGPT) U/L 8  --   --  9     Estimated Creatinine Clearance: 57.2 mL/min (by C-G formula based on SCr of 0.63 mg/dL).          Results from last 7 days   Lab Units 04/10/21  0642 04/07/21  2335   CK TOTAL U/L 194*  --    TROPONIN T ng/mL  --  <0.010     Results from last 7 days   Lab Units 04/07/21  2335   PROBNP pg/mL 1,228.0     Results from last 7 days   Lab Units 04/09/21  0725   TSH uIU/mL 0.509           Invalid input(s):  PHOS        Invalid input(s): LDLCALC  Results from last 7 days   Lab Units 04/10/21  0642 04/09/21  0725 04/08/21  0912 04/07/21  2335   WBC 10*3/mm3 3.73 3.30* 3.01* 3.64   HEMOGLOBIN g/dL 12.5 12.9 13.2 12.2   HEMATOCRIT % 38.1 38.3 38.5 37.7   PLATELETS 10*3/mm3 144 152 142 163   MCV fL 98.4* 97.5* 97.5* 99.0*   MCH pg 32.3 32.8 33.4* 32.0   MCHC g/dL 32.8 33.7 34.3 32.4   RDW % 12.7 12.8 12.7 13.1   RDW-SD fl 45.2 45.4 44.9 46.8   MPV fL 9.5 9.3 9.1 9.3   NEUTROPHIL % %  --  39.7*  --   --    LYMPHOCYTE % %  --  45.5*  --   --    MONOCYTES % %  --  10.6  --   --    EOSINOPHIL % %  --  3.0  --   --    BASOPHIL % %  --  0.9  --   --    NEUTROS ABS 10*3/mm3  --  1.31*  --  1.26*   LYMPHS ABS 10*3/mm3  --  1.50  --   --    MONOS ABS 10*3/mm3  --  0.35  --   --    EOS ABS 10*3/mm3  --  0.10  --  0.04   BASOS ABS 10*3/mm3  --  0.03  --  0.04   NRBC /100 WBC  --   --   --  0.0     Results from last 7 days   Lab Units 04/10/21  0642 04/09/21  0725 04/07/21  2335 04/05/21  0000   INR  2.25* 2.21* 2.15* 2.40         Results from last 7 days   Lab Units 04/08/21  0548   PROCALCITONIN ng/mL 0.05             Results from last 7 days   Lab Units 04/08/21  0107   URINECX  >100,000 CFU/mL  Escherichia coli*         Results from last 7 days   Lab Units 04/08/21  0107   NITRITE UA  Positive*   WBC UA /HPF 31-50*   BACTERIA UA /HPF 4+*   SQUAM EPITHEL UA /HPF 0-2   URINECX  >100,000 CFU/mL Escherichia coli*           Imaging:  Imaging Results (Last 24 Hours)     ** No results found for the last 24 hours. **             I reviewed the patient's new clinical results / labs / tests / procedures      Assessment/Plan     Active Hospital Problems    Diagnosis  POA   • **Generalized weakness [R53.1]  Yes   • Dysautonomia (CMS/HCC) [G90.1]  Yes   • Weakness of both lower extremities [R29.898]  Yes   • Acute UTI (urinary tract infection) [N39.0]  Yes   • Chronic anticoagulation [Z79.01]  Not Applicable   • Immobility [Z74.09]  Yes   • Obesity (BMI 30-39.9) [E66.9]  Yes   • Atrial fibrillation, persistent (CMS/HCC) [I48.19]  Yes   • 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  Differential diagnosis myopathy/cord abnormality.  CK only mildly elevated.  Not on steroids or other medications that can cause myopathy.  Discussed with neurology.  Plan for MRI of the lumbar spine.  PT and OT on board.  · E. coli UTI sensitive to doxycycline we will switch from Rocephin to doxycycline patient is asymptomatic.  · History of atrial fibrillation/diastolic congestive heart failure/hypertension tension/pulmonary hypertension.  BNP is normal.  Rate is controlled.  Blood pressure is controlled.  Currently on Coumadin with therapeutic INR.  Will monitor.  No  signs of angina or congestive heart failure.  Echo with normal ejection fraction right atrial dilatation left atrial dilation and diastolic dysfunction  · History of ulcerative colitis.  Continue Protonix and mesalamine  · History of obstructive sleep apnea.  Nocturnal oxygen.  · Hyperbilirubinemia.  Intermittent problem.  Suspect Gilbert's disease. Benign GI examination.  Will monitor.    · Discussed with patient/neurology      Cedric Cee MD  [facility]  04/10/21  13:02 EDT

## 2021-04-10 NOTE — CONSULTS
Patient Name: Brittany Villeda  :1935  86 y.o.    Date of Admission: 2021  Encounter Provider: Brooke Faustin MD  Date of Encounter Visit: 04/10/21  Place of Service: [facility] CARDIOLOGY  Referring Provider: Kevin Leonard MD  Patient Care Team:  Mega Esparza MD as PCP - General (Family Medicine)  Micaela English MD as Consulting Physician (Gastroenterology)  Mary Grace Culp MD as Consulting Physician (Cardiology)  Jp Krause Jr., MD as Consulting Physician (Hematology and Oncology)  Yasmeen Pichardo [facility] as Pharmacist  Micaela English MD as Referring Physician (Gastroenterology)  Devon Valadez MD as Consulting Physician (Hematology and Oncology)  Rusty Interiano [facility] as Pharmacist (Pharmacy)      Chief complaint: Weakness    History of Present Illness:    This is a lady with paroxysmal atrial fibrillation on warfarin, hypertension, borderline dilated aortic root, sick sinus syndrome status post [employer] pacemaker in 2018.  She came to the emergency room with weakness and some inability to move her legs.  Neurology was consulted and would like to do an MRI of the back.  We were asked to see her to make sure her pacemaker is compatible.    She says she does not feel good but has a hard time describing it.  She denies chest pain, palpitations or shortness of breath.    Prior Cardiac Testing:   Echo 2021  · The right atrial cavity is severely dilated.  · Left atrium is dilated severely  · Estimated left ventricular EF = 60% Left ventricular systolic function is normal.  · Left ventricular diastolic function was indeterminate.  · Mildly reduced right ventricular systolic function noted.  · Saline test results are negative.    Pacer Interrogation 2020      Stress Test 2019  · Myocardial perfusion imaging indicates a normal myocardial perfusion study with no evidence of ischemia.  · Left ventricular ejection fraction is normal (Calculated EF =  69%).  · Impressions are consistent with a low risk study.         Past Medical History:   Diagnosis Date   • Acute UTI (urinary tract infection) 4/8/2021   • Anemia    • Arrhythmia    • Arthritis    • Asthma    • Bradycardia    • Chest pain    • Colitis    • COPD (chronic obstructive pulmonary disease) (CMS/[facility])    • Diastolic dysfunction    • Essential hypertension 5/12/2016   • GERD (gastroesophageal reflux disease)    • Heart block    • Hypertension    • Hypertensive heart disease    • Hyperthyroidism    • Kidney stone    • Leukopenia    • Low back pain    • MGUS (monoclonal gammopathy of unknown significance)    • Nephrolithiasis    • Obesity    • Paroxysmal atrial fibrillation (CMS/HCC)    • Peptic ulceration    • PSVT (paroxysmal supraventricular tachycardia) (CMS/[facility])    • Pulmonary hypertension (CMS/HCC)    • Rectal bleed    • Sleep apnea    • Trifascicular block    • 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CMS/[facility])    • Ventricular tachycardia (CMS/[facility])     nonsustained   • Vertigo        Past Surgical History:   Procedure Laterality Date   • BACK SURGERY      lumbar fusion   • CARDIAC ELECTROPHYSIOLOGY PROCEDURE N/A 11/7/2018    Procedure: Pacemaker DC new   [place];  Surgeon: Jesus Granda MD;  Location: [facility] CATH INVASIVE LOCATION;  Service: Cardiology   • CHOLECYSTECTOMY     • COLONOSCOPY  06/16/2014    colitis, cryptitis,  tics, NBIH, TA w/low grade dysplasia   • COLONOSCOPY N/A 10/28/2019    Procedure: COLONOSCOPY WITH COLD AND HOT POLYPECTOMIES;  Surgeon: Micaela English MD;  Location: [facility] ENDOSCOPY;  Service: Gastroenterology   • HEMORRHOIDECTOMY     • HYSTERECTOMY     • KNEE ARTHROPLASTY     • MYOMECTOMY     • SHOULDER SURGERY     • SINUS SURGERY     • TOE NAIL AMPUTATION  03/04/2019   • TONSILLECTOMY           Prior to Admission medications    Medication Sig Start Date End Date Taking? Authorizing Provider   acetaminophen (TYLENOL) 500 MG tablet Take 1 tablet by mouth  Every 6 (Six) Hours As Needed for Mild Pain . 20   Vargas Jones MD   albuterol sulfate  (90 Base) MCG/ACT inhaler Inhale 2 puffs Every 6 (Six) Hours As Needed for Wheezing. 19   Merlin Archibald MD   calcium carbonate-cholecalciferol 500-400 MG-UNIT tablet tablet Take 1 tablet by mouth 2 (two) times a day.    Ely Bolanos MD   docusate sodium (COLACE) 100 MG capsule Take 100 mg by mouth Every Night.    Ely Bolanos MD   mesalamine (APRISO) 0.375 g 24 hr capsule TAKE 4 CAPSULES DAILY 21   Micaela English MD   omeprazole (priLOSEC) 20 MG capsule TAKE 1 CAPSULE EVERY DAY 10/13/20   Micaela English MD   vitamin B-12 (CYANOCOBALAMIN) 1000 MCG tablet Take 1,000 mcg by mouth Daily. 4/9/15   Ely Bolanos MD   warfarin (COUMADIN) 1 MG tablet Take 5 mg by mouth Daily.    Ely Bolanos MD   Wheat Dextrin (BENEFIBER DRINK MIX PO) Take  by mouth Every 2 (Two) Hours As Needed.    Ely Bolanos MD       Allergies   Allergen Reactions   • Codeine Hallucinations   • Amitriptyline Rash   • Amoxicillin-Pot Clavulanate Rash   • Aspirin Unknown - Low Severity     Patient doesn't know why   • Bactrim [Sulfamethoxazole-Trimethoprim] Rash   • Carisoprodol-Aspirin-Codeine Palpitations   • Iodinated Diagnostic Agents Rash   • Latex Rash   • Naproxen Rash   • Nsaids Unknown - Low Severity     unkknown   • Soma Compound With Codeine [Carisoprodol-Aspirin-Codeine] Rash   • Sulfa Antibiotics Rash   • Tramadol Palpitations     heart races        Social History     Socioeconomic History   • Marital status:      Spouse name: Not on file   • Number of children: 10   • Years of education: High School   • Highest education level: Not on file   Tobacco Use   • Smoking status: Former Smoker     Packs/day: 1.50     Years: 10.00     Pack years: 15.00     Start date:      Quit date:      Years since quittin.3   • Smokeless tobacco: Never Used   • Tobacco  comment: QUIT SMOKING 1965   Substance and Sexual Activity   • Alcohol use: No     Comment: Daily caffeine use - one cup of coffee   • Drug use: Defer   • Sexual activity: Defer       Family History   Problem Relation Age of Onset   • Diabetes Mother    • Breast cancer Sister    • Kidney cancer Sister    • Heart disease Sister    • Prostate cancer Brother    • Prostate cancer Brother    • Prostate cancer Brother    • Malig Hyperthermia Neg Hx        REVIEW OF SYSTEMS:   All other systems reviewed and negative.        Objective:     Vitals:    04/09/21 1938 04/09/21 2329 04/10/21 0731 04/10/21 1314   BP: 111/64 114/59 119/67 134/80   BP Location: Left arm Left arm Left arm Left arm   Patient Position: Lying Lying Lying Sitting   Pulse: 70 78 70 70   Resp: 18 18 18 18   Temp: 98.4 °F (36.9 °C) 98 °F (36.7 °C) 98.2 °F (36.8 °C) 98.3 °F (36.8 °C)   TempSrc: Oral Oral Oral Oral   SpO2: 99% 96% 97% 99%   Weight:       Height:         Body mass index is 36.9 kg/m².    Intake/Output Summary (Last 24 hours) at 4/10/2021 1457  Last data filed at 4/10/2021 1314  Gross per 24 hour   Intake 890 ml   Output --   Net 890 ml       Constitutional: She is oriented to person, place, and time. She appears well-developed. She does not appear ill.   HENT:   Head: Normocephalic and atraumatic. Head is without contusion.   Right Ear: Hearing normal. No drainage.   Left Ear: Hearing normal. No drainage.   Nose: No nasal deformity. No epistaxis.   Eyes: Lids are normal. Right eye exhibits no exudate. Left eye exhibits no exudate.  Neck: No JVD present. Carotid bruit is not present. No tracheal deviation present. No thyroid mass and no thyromegaly present.   Cardiovascular: Normal rate, regular rhythm and normal heart sounds.    Pulses:       Posterior tibial pulses are 2+ on the right side, and 2+ on the left side.   Pulmonary/Chest: Effort normal and breath sounds normal.   Abdominal: Soft. Normal appearance and bowel sounds are normal.  There is no tenderness.   Musculoskeletal: Normal range of motion.        Right shoulder: She exhibits no deformity.        Left shoulder: She exhibits no deformity.   Neurological: She is alert and oriented to person, place, and time. She has normal strength.   Skin: Skin is warm, dry and intact. No rash noted.   Psychiatric: She has a normal mood and affect. Her behavior is normal. Thought content normal.   Vitals reviewed      Lab Review:     Results from last 7 days   Lab Units 04/10/21  0642   SODIUM mmol/L 139   POTASSIUM mmol/L 4.3   CHLORIDE mmol/L 106   CO2 mmol/L 26.2   BUN mg/dL 11   CREATININE mg/dL 0.63   CALCIUM mg/dL 10.4   BILIRUBIN mg/dL 1.7*   ALK PHOS U/L 67   ALT (SGPT) U/L 8   AST (SGOT) U/L 16   GLUCOSE mg/dL 82     Results from last 7 days   Lab Units 04/10/21  0642 04/07/21  2335   CK TOTAL U/L 194*  --    TROPONIN T ng/mL  --  <0.010     Results from last 7 days   Lab Units 04/10/21  0642   WBC 10*3/mm3 3.73   HEMOGLOBIN g/dL 12.5   HEMATOCRIT % 38.1   PLATELETS 10*3/mm3 144     Results from last 7 days   Lab Units 04/10/21  0642 04/09/21  0725 04/07/21  2335   INR  2.25* 2.21* 2.15*                EKG 4/7/2021      Prior EKG 11/27/2020      I personally viewed and interpreted the patient's EKG/Telemetry data.        Assessment and Plan:       1.  [employer] pacemaker.  This is MRI compatible and either 1.5 or 3 Nancy magnet.  Okay to proceed with MRI of the back.  2.  Atrial fibrillation.  Rate controlled.  On warfarin  3.  Sick sinus syndrome with pacemaker as above  4.  Hypertension    Stable from a cardiac standpoint.  Please call if there are any further cardiac issues.    Brooke Faustin MD  04/10/21  14:57 EDT

## 2021-04-10 NOTE — THERAPY TREATMENT NOTE
Patient Name: Brittany Villeda  : 1935    MRN: [id number]                              Today's Date: 4/10/2021       Admit Date: 2021    Visit Dx:     ICD-10-CM ICD-9-CM   1. Generalized weakness  R53.1 780.79   2. Acute dyspnea  R06.00 786.09     Patient Active Problem List   Diagnosis   • Sciatica   • Non-toxic multinodular goiter   • Chronic midline low back pain with right-sided sciatica   • Essential hypertension   • Gastroesophageal reflux disease without esophagitis   • Cataract   • Pulmonary hypertension (CMS/HCC)   • Diastolic dysfunction   • HUGO (obstructive sleep apnea)   • Trifascicular block   • Leukopenia   • MGUS (monoclonal gammopathy of unknown significance)   • 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)   • Other chest pain   • Lichen sclerosus   • Heart block   • Sleep-related hypoxia   • Bradycardia   • Shoulder arthritis   • History of atrial fibrillation   • Pacemaker   • Paroxysmal SVT (supraventricular tachycardia) (CMS/HCC)   • History of chest pain   • Palpitations   • Atrial fibrillation, persistent (CMS/HCC)   • Rectal bleeding   • Arthritis   • Ascending aortic aneurysm (CMS/HCC)   • Mediastinal lymphadenopathy   • Immobility   • Left knee pain   • Chronic anticoagulation   • Hemarthrosis of left knee   • Anemia   • Obesity (BMI 30-39.9)   • Generalized weakness   • Acute UTI (urinary tract infection)   • Dysautonomia (CMS/HCC)   • Weakness of both lower extremities     Past Medical History:   Diagnosis Date   • Acute UTI (urinary tract infection) 2021   • Anemia    • Arrhythmia    • Arthritis    • Asthma    • Bradycardia    • Chest pain    • Colitis    • COPD (chronic obstructive pulmonary disease) (CMS/HCC)    • Diastolic dysfunction    • Essential hypertension 2016   • GERD (gastroesophageal reflux disease)    • Heart block    • Hypertension    • Hypertensive heart disease    • Hyperthyroidism    • Kidney stone    • Leukopenia    • Low back pain     • MGUS (monoclonal gammopathy of unknown significance)    • Nephrolithiasis    • Obesity    • Paroxysmal atrial fibrillation (CMS/HCC)    • Peptic ulceration    • PSVT (paroxysmal supraventricular tachycardia) (CMS/HCC)    • Pulmonary hypertension (CMS/HCC)    • Rectal bleed    • Sleep apnea    • Trifascicular block    • 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)    • Ventricular tachycardia (CMS/HCC)     nonsustained   • Vertigo      Past Surgical History:   Procedure Laterality Date   • BACK SURGERY      lumbar fusion   • CARDIAC ELECTROPHYSIOLOGY PROCEDURE N/A 11/7/2018    Procedure: Pacemaker DC new   [place];  Surgeon: Jesus Granda MD;  Location: [facility] CATH INVASIVE LOCATION;  Service: Cardiology   • CHOLECYSTECTOMY     • COLONOSCOPY  06/16/2014    colitis, cryptitis,  tics, NBIH, TA w/low grade dysplasia   • COLONOSCOPY N/A 10/28/2019    Procedure: COLONOSCOPY WITH COLD AND HOT POLYPECTOMIES;  Surgeon: Micaela English MD;  Location: [facility] ENDOSCOPY;  Service: Gastroenterology   • HEMORRHOIDECTOMY     • HYSTERECTOMY     • KNEE ARTHROPLASTY     • MYOMECTOMY     • SHOULDER SURGERY     • SINUS SURGERY     • TOE NAIL AMPUTATION  03/04/2019   • TONSILLECTOMY       General Information     Row Name 04/10/21 1135          Physical Therapy Time and Intention    Document Type  therapy note (daily note)  -DB     Mode of Treatment  individual therapy;physical therapy  -DB     Row Name 04/10/21 1134          General Information    Patient Profile Reviewed  yes  -DB       User Key  (r) = Recorded By, (t) = Taken By, (c) = Cosigned By    Initials Name Provider Type    DB Karen Hunt PT Physical Therapist        Mobility     Row Name 04/10/21 3031          Bed Mobility    Supine-Sit [place] (Bed Mobility)  moderate assist (50% patient effort);2 person assist;verbal cues;nonverbal cues (demo/gesture);minimum assist (75% patient effort)  -DB     Assistive Device (Bed Mobility)  bed  rails;head of bed elevated  -DB     Row Name 04/10/21 1135          Bed-Chair Transfer    Bed-Chair [place] (Transfers)  moderate assist (50% patient effort);maximum assist (25% patient effort);2 person assist;verbal cues;nonverbal cues (demo/gesture) stand pivot transfer from bed to chair with HHA  -DB     Assistive Device (Bed-Chair Transfers)  other (see comments) HHA  -DB     Row Name 04/10/21 1135          Sit-Stand Transfer    Sit-Stand [place] (Transfers)  moderate assist (50% patient effort);2 person assist;verbal cues;nonverbal cues (demo/gesture)  -DB     Assistive Device (Sit-Stand Transfers)  other (see comments) HHA  -DB       User Key  (r) = Recorded By, (t) = Taken By, (c) = Cosigned By    Initials Name Provider Type    DB Karen Hunt, PT Physical Therapist        Obj/Interventions     Row Name 04/10/21 1136          Balance    Balance Assessment  sitting static balance;sitting dynamic balance;standing static balance;standing dynamic balance  -DB     Static Sitting Balance  WFL;unsupported;sitting, edge of bed  -DB     Dynamic Sitting Balance  mild impairment;unsupported;sitting, edge of bed  -DB     Static Standing Balance  mild impairment  -DB     Dynamic Standing Balance  moderate impairment  -DB     Balance Interventions  sitting;standing;sit to stand  -DB       User Key  (r) = Recorded By, (t) = Taken By, (c) = Cosigned By    Initials Name Provider Type    DB Karen Hunt, PT Physical Therapist        Goals/Plan    No documentation.       Clinical Impression     Row Name 04/10/21 1137          Pain Scale: Numbers Pre/Post-Treatment    Pain Intervention(s)  Ambulation/increased activity;Repositioned  -DB     Row Name 04/10/21 1137          Plan of Care Review    Plan of Care Reviewed With  patient  -DB     Progress  improving  -DB     Outcome Summary  Pt agreeable to therapy this date. Pt performed bed mob with modA x2 and stand pivot transfer to the chair with mod/maxA x2 and  HHA. Pt continues to benefit from PT to improve function.  -DB     Row Name 04/10/21 1137          Vital Signs    O2 Delivery Pre Treatment  room air  -DB     O2 Delivery Intra Treatment  room air  -DB     O2 Delivery Post Treatment  room air  -DB     Pre Patient Position  Supine  -DB     Intra Patient Position  Standing  -DB     Post Patient Position  Sitting  -DB     Row Name 04/10/21 1137          Positioning and Restraints    Pre-Treatment Position  in bed  -DB     Post Treatment Position  chair  -DB     In Chair  reclined;call light within reach;encouraged to call for assist  -DB       User Key  (r) = Recorded By, (t) = Taken By, (c) = Cosigned By    Initials Name Provider Type    Karen Norman, PT Physical Therapist        Outcome Measures     Row Name 04/10/21 1139          How much help from another person do you currently need...    Turning from your back to your side while in flat bed without using bedrails?  3  -DB     Moving from lying on back to sitting on the side of a flat bed without bedrails?  2  -DB     Moving to and from a bed to a chair (including a wheelchair)?  2  -DB     Standing up from a chair using your arms (e.g., wheelchair, bedside chair)?  2  -DB     Climbing 3-5 steps with a railing?  1  -DB     To walk in hospital room?  1  -DB     AM-PAC 6 Clicks Score (PT)  11  -DB     Row Name 04/10/21 1139          Functional Assessment    Outcome Measure Options  AM-PAC 6 Clicks Basic Mobility (PT)  -DB       User Key  (r) = Recorded By, (t) = Taken By, (c) = Cosigned By    Initials Name Provider Type    Karen Norman PT Physical Therapist        Physical Therapy Education                 Title: PT OT SLP Therapies (In Progress)     Topic: Physical Therapy (Done)     Point: Mobility training (Done)     Learning Progress Summary           Patient Acceptance, E, VU by DB at 4/10/2021 1139    Acceptance, E,TB,D, VU,NR by ZENA at 4/9/2021 1005    Acceptance, E,TB,D, VU,NR by CB at 4/8/2021  1628                   Point: Home exercise program (Done)     Learning Progress Summary           Patient Acceptance, E, VU by DB at 4/10/2021 1139    Acceptance, E,TB,D, VU,NR by  at 4/9/2021 1005    Acceptance, E,TB,D, VU,NR by CB at 4/8/2021 1628                   Point: Body mechanics (Done)     Learning Progress Summary           Patient Acceptance, E, VU by DB at 4/10/2021 1139    Acceptance, E,TB,D, VU,NR by  at 4/9/2021 1005    Acceptance, E,TB,D, VU,NR by CB at 4/8/2021 1628                   Point: Precautions (Done)     Learning Progress Summary           Patient Acceptance, E, VU by DB at 4/10/2021 1139    Acceptance, E,TB,D, VU,NR by  at 4/9/2021 1005    Acceptance, E,TB,D, VU,NR by CB at 4/8/2021 1628                               User Key     Initials Effective Dates Name Provider Type Discipline     03/07/18 -  Desire Matthews, PTA Physical Therapy Assistant PT    DB 01/22/20 -  Karen Hunt, PT Physical Therapist PT    CB 12/30/20 -  Luzma Ramírez Physical Therapist PT              PT Recommendation and Plan     Plan of Care Reviewed With: patient  Progress: improving  Outcome Summary: Pt agreeable to therapy this date. Pt performed bed mob with modA x2 and stand pivot transfer to the chair with mod/maxA x2 and HHA. Pt continues to benefit from PT to improve function.     Time Calculation:   PT Charges     Row Name 04/10/21 1140             Time Calculation    Start Time  1015  -DB      Stop Time  1029  -DB      Time Calculation (min)  14 min  -DB      PT Received On  04/10/21  -DB      PT - Next Appointment  04/11/21  -DB         Time Calculation- PT    Total Timed Code Minutes- PT  14 minute(s)  -DB        User Key  (r) = Recorded By, (t) = Taken By, (c) = Cosigned By    Initials Name Provider Type    DB Karen Hunt, PT Physical Therapist        Therapy Charges for Today     Code Description Service Date Service Provider Modifiers Qty    00264404153 HC PT THER PROC EA 15 MIN  4/10/2021 Karen Hunt, PT GP 1          PT G-Codes  Outcome Measure Options: AM-PAC 6 Clicks Basic Mobility (PT)  AM-PAC 6 Clicks Score (PT): 11  AM-PAC 6 Clicks Score (OT): 12    Karen Hunt, PT  4/10/2021

## 2021-04-10 NOTE — PLAN OF CARE
Goal Outcome Evaluation:  Plan of Care Reviewed With: patient  Progress: no change  Outcome Summary: All needs met this shift. Bedrest. Regular cardiac diet. VSS. Alert and oriented x 4. [employer]. On room air. V-paced on the monitor. Tylenol given for pain and effective. Will CTM. Plan for MRI, waiting for approval on pacemaker.

## 2021-04-11 PROBLEM — D75.89 MACROCYTOSIS: Status: ACTIVE | Noted: 2021-04-11

## 2021-04-11 PROBLEM — E83.52 HYPERCALCEMIA: Status: ACTIVE | Noted: 2021-04-11

## 2021-04-11 LAB
ALBUMIN SERPL-MCNC: 3.2 G/DL (ref 3.5–5.2)
ALBUMIN/GLOB SERPL: 0.9 G/DL
ALP SERPL-CCNC: 77 U/L (ref 39–117)
ALT SERPL W P-5'-P-CCNC: 8 U/L (ref 1–33)
ANION GAP SERPL CALCULATED.3IONS-SCNC: 11.7 MMOL/L (ref 5–15)
AST SERPL-CCNC: 22 U/L (ref 1–32)
BASOPHILS # BLD AUTO: 0.02 10*3/MM3 (ref 0–0.2)
BASOPHILS NFR BLD AUTO: 0.4 % (ref 0–1.5)
BILIRUB SERPL-MCNC: 1.9 MG/DL (ref 0–1.2)
BUN SERPL-MCNC: 11 MG/DL (ref 8–23)
BUN/CREAT SERPL: 20 (ref 7–25)
CA-I BLD-MCNC: 6.2 MG/DL (ref 4.6–5.4)
CA-I SERPL ISE-MCNC: 1.54 MMOL/L (ref 1.15–1.35)
CALCIUM SPEC-SCNC: 10.7 MG/DL (ref 8.6–10.5)
CHLORIDE SERPL-SCNC: 102 MMOL/L (ref 98–107)
CK SERPL-CCNC: 211 U/L (ref 20–180)
CO2 SERPL-SCNC: 24.3 MMOL/L (ref 22–29)
CREAT SERPL-MCNC: 0.55 MG/DL (ref 0.57–1)
DEPRECATED RDW RBC AUTO: 50 FL (ref 37–54)
EOSINOPHIL # BLD AUTO: 0.12 10*3/MM3 (ref 0–0.4)
EOSINOPHIL NFR BLD AUTO: 2.5 % (ref 0.3–6.2)
ERYTHROCYTE [DISTWIDTH] IN BLOOD BY AUTOMATED COUNT: 13 % (ref 12.3–15.4)
GFR SERPL CREATININE-BSD FRML MDRD: 127 ML/MIN/1.73
GLOBULIN UR ELPH-MCNC: 3.6 GM/DL
GLUCOSE SERPL-MCNC: 86 MG/DL (ref 65–99)
HCT VFR BLD AUTO: 41.1 % (ref 34–46.6)
HGB BLD-MCNC: 13.2 G/DL (ref 12–15.9)
IMM GRANULOCYTES # BLD AUTO: 0.01 10*3/MM3 (ref 0–0.05)
IMM GRANULOCYTES NFR BLD AUTO: 0.2 % (ref 0–0.5)
INR PPP: 1.99 (ref 0.9–1.1)
LYMPHOCYTES # BLD AUTO: 1.72 10*3/MM3 (ref 0.7–3.1)
LYMPHOCYTES NFR BLD AUTO: 35.5 % (ref 19.6–45.3)
MAGNESIUM SERPL-MCNC: 1.6 MG/DL (ref 1.6–2.4)
MCH RBC QN AUTO: 33.1 PG (ref 26.6–33)
MCHC RBC AUTO-ENTMCNC: 32.1 G/DL (ref 31.5–35.7)
MCV RBC AUTO: 103 FL (ref 79–97)
MONOCYTES # BLD AUTO: 0.68 10*3/MM3 (ref 0.1–0.9)
MONOCYTES NFR BLD AUTO: 14 % (ref 5–12)
NEUTROPHILS NFR BLD AUTO: 2.3 10*3/MM3 (ref 1.7–7)
NEUTROPHILS NFR BLD AUTO: 47.4 % (ref 42.7–76)
NRBC BLD AUTO-RTO: 0 /100 WBC (ref 0–0.2)
PLATELET # BLD AUTO: 146 10*3/MM3 (ref 140–450)
PMV BLD AUTO: 9.5 FL (ref 6–12)
POTASSIUM SERPL-SCNC: 4.1 MMOL/L (ref 3.5–5.2)
PROT SERPL-MCNC: 6.8 G/DL (ref 6–8.5)
PROTHROMBIN TIME: 22.4 SECONDS (ref 11.7–14.2)
RBC # BLD AUTO: 3.99 10*6/MM3 (ref 3.77–5.28)
SODIUM SERPL-SCNC: 138 MMOL/L (ref 136–145)
TSH SERPL DL<=0.05 MIU/L-ACNC: 0.54 UIU/ML (ref 0.27–4.2)
WBC # BLD AUTO: 4.85 10*3/MM3 (ref 3.4–10.8)

## 2021-04-11 PROCEDURE — 80053 COMPREHEN METABOLIC PANEL: CPT | Performed by: INTERNAL MEDICINE

## 2021-04-11 PROCEDURE — G0378 HOSPITAL OBSERVATION PER HR: HCPCS

## 2021-04-11 PROCEDURE — 84443 ASSAY THYROID STIM HORMONE: CPT | Performed by: INTERNAL MEDICINE

## 2021-04-11 PROCEDURE — 85025 COMPLETE CBC W/AUTO DIFF WBC: CPT | Performed by: INTERNAL MEDICINE

## 2021-04-11 PROCEDURE — 82550 ASSAY OF CK (CPK): CPT | Performed by: INTERNAL MEDICINE

## 2021-04-11 PROCEDURE — 85610 PROTHROMBIN TIME: CPT | Performed by: INTERNAL MEDICINE

## 2021-04-11 PROCEDURE — 83735 ASSAY OF MAGNESIUM: CPT | Performed by: INTERNAL MEDICINE

## 2021-04-11 PROCEDURE — 96361 HYDRATE IV INFUSION ADD-ON: CPT

## 2021-04-11 PROCEDURE — 82330 ASSAY OF CALCIUM: CPT | Performed by: INTERNAL MEDICINE

## 2021-04-11 RX ORDER — SODIUM CHLORIDE 9 MG/ML
75 INJECTION, SOLUTION INTRAVENOUS CONTINUOUS
Status: DISCONTINUED | OUTPATIENT
Start: 2021-04-11 | End: 2021-04-15 | Stop reason: HOSPADM

## 2021-04-11 RX ADMIN — MESALAMINE 1.5 G: 0.38 CAPSULE, EXTENDED RELEASE ORAL at 09:28

## 2021-04-11 RX ADMIN — Medication 1000 MCG: at 09:28

## 2021-04-11 RX ADMIN — Medication 1 EACH: at 09:29

## 2021-04-11 RX ADMIN — DOCUSATE SODIUM 100 MG: 100 CAPSULE ORAL at 20:31

## 2021-04-11 RX ADMIN — CALCIUM CARBONATE-VITAMIN D TAB 500 MG-200 UNIT 500 MG: 500-200 TAB at 09:29

## 2021-04-11 RX ADMIN — ACETAMINOPHEN 650 MG: 325 TABLET ORAL at 06:49

## 2021-04-11 RX ADMIN — DOXYCYCLINE 100 MG: 100 CAPSULE ORAL at 09:28

## 2021-04-11 RX ADMIN — SODIUM CHLORIDE 75 ML/HR: 9 INJECTION, SOLUTION INTRAVENOUS at 18:42

## 2021-04-11 RX ADMIN — DOXYCYCLINE 100 MG: 100 CAPSULE ORAL at 20:31

## 2021-04-11 RX ADMIN — SODIUM CHLORIDE, PRESERVATIVE FREE 10 ML: 5 INJECTION INTRAVENOUS at 20:31

## 2021-04-11 RX ADMIN — ACETAMINOPHEN 650 MG: 325 TABLET ORAL at 11:00

## 2021-04-11 RX ADMIN — PANTOPRAZOLE SODIUM 40 MG: 40 TABLET, DELAYED RELEASE ORAL at 06:48

## 2021-04-11 RX ADMIN — WARFARIN 2.5 MG: 2.5 TABLET ORAL at 20:30

## 2021-04-11 RX ADMIN — SODIUM CHLORIDE 75 ML/HR: 9 INJECTION, SOLUTION INTRAVENOUS at 21:34

## 2021-04-11 RX ADMIN — SODIUM CHLORIDE, PRESERVATIVE FREE 10 ML: 5 INJECTION INTRAVENOUS at 09:30

## 2021-04-11 RX ADMIN — ACETAMINOPHEN 650 MG: 325 TABLET ORAL at 21:28

## 2021-04-11 NOTE — PLAN OF CARE
Goal Outcome Evaluation:  Plan of Care Reviewed With: patient  Progress: no change  Patient's VSS this shift.  She sat up in chair for 4 to 5 hours today, and reports that this was too long compared to what she is used to.  This RN encouraged patient/family to notify staff whenever she feels she would like to return to bed.  Awaiting MRI spine, hopefully to be completed tomorrow.

## 2021-04-11 NOTE — PROGRESS NOTES
I did not see this patient in follow-up today but have noted that she is neurologically stable.  Recall she has proximal greater than distal leg weakness without myelopathic symptoms or signs.  We have scheduled an MRI of the lumbosacral spine but we are awaiting clearance by cardiology given the fact she has a [employer] pacemaker.  I am confident given her low back pain she has some degree of lumbosacral spondylosis but I am not convinced she has an acute bilateral radiculopathy.  Jp Daniels MD

## 2021-04-11 NOTE — SIGNIFICANT NOTE
04/11/21 1155   OTHER   Discipline physical therapist   Rehab Time/Intention   Session Not Performed patient/family declined treatment   Recommendation   PT - Next Appointment 04/12/21      6

## 2021-04-11 NOTE — PROGRESS NOTES
Pharmacy Consult: Warfarin Dosing/ Monitoring    Brittany Villeda is a 86 y.o. female, estimated creatinine clearance is 57.2 mL/min (by C-G formula based on SCr of 0.65 mg/dL). weighing 97.5 kg (215 lb).     has a past medical history of Acute UTI (urinary tract infection) (2021), Anemia, Arrhythmia, Arthritis, Asthma, Bradycardia, Chest pain, Colitis, COPD (chronic obstructive pulmonary disease) (CMS/[facility]), Diastolic dysfunction, Essential hypertension (2016), GERD (gastroesophageal reflux disease), Heart block, Hypertension, Hypertensive heart disease, Hyperthyroidism, Kidney stone, Leukopenia, Low back pain, MGUS (monoclonal gammopathy of unknown significance), Nephrolithiasis, Obesity, Paroxysmal atrial fibrillation (CMS/[facility]), Peptic ulceration, PSVT (paroxysmal supraventricular tachycardia) (CMS/[facility]), Pulmonary hypertension (CMS/[facility]), Rectal bleed, Sleep apnea, Trifascicular block, 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CMS/[facility]), Ventricular tachycardia (CMS/[facility]), and Vertigo.    Social History     Tobacco Use    Smoking status: Former Smoker     Packs/day: 1.50     Years: 10.00     Pack years: 15.00     Start date:      Quit date:      Years since quittin.3    Smokeless tobacco: Never Used    Tobacco comment: QUIT SMOKING    Substance Use Topics    Alcohol use: No     Comment: Daily caffeine use - one cup of coffee    Drug use: Defer       Results from last 7 days   Lab Units 04/11/21  0431 04/10/21  0642 04/09/21  0725 21  0912 21  2335 21  0000   INR  1.99* 2.25* 2.21*  --  2.15* 2.40   HEMOGLOBIN g/dL 13.2 12.5 12.9 13.2 12.2  --    HEMATOCRIT % 41.1 38.1 38.3 38.5 37.7  --    PLATELETS 10*3/mm3 146 144 152 142 163  --      Results from last 7 days   Lab Units 04/11/21  0431 04/10/21  0642 21  0725 21  2335   SODIUM mmol/L 138 139 138 139   POTASSIUM mmol/L 4.1 4.3 4.3 4.4   CHLORIDE mmol/L 102 106 105 106   CO2 mmol/L 24.3 26.2 26.8 24.6    BUN mg/dL 11 11 10 12   CREATININE mg/dL 0.55* 0.63 0.65 0.68   CALCIUM mg/dL 10.7* 10.4 10.2 9.7   BILIRUBIN mg/dL 1.9* 1.7*  --  0.6   ALK PHOS U/L 77 67  --  93   ALT (SGPT) U/L 8 8  --  9   AST (SGOT) U/L 22 16  --  16   GLUCOSE mg/dL 86 82 85 108*     Anticoagulation history: Warfarin therapy is managed outpatient by the medication management clinic. Patient takes 5 mg Mon,Thu,Sat and 2.5 mg all other days    Hospital Anticoagulation:  Consulting provider: SHAHID Randall  Start date: 4/8  Indication: Atrial fibrillation  Target INR: 2.0-3.0  Expected duration: Indefinite   Bridge Therapy: No                  Date 4/8 4/9  4/10  4/11         INR 2.15 2.21  2.25 1.99         Warfarin dose 5 mg 2.5mg  5 mg  2.5mg           Potential drug interactions:  Ceftriaxone - may enhance the effect of warfarin    Relevant nutrition status: Regular cardiac diet, breakfast 50%    Other: None    Education complete?/ Date: PTA    Assessment/Plan:  continue outpatient warfarin regimen as above in anticoagulation history....patient will receive 2.5mg of warfarin today.  Monitor for signs/symptoms of bleeding  Follow up INR tomorrow    Pharmacy will continue to follow until discharge or discontinuation of warfarin.       Janice Alonso, [facility]

## 2021-04-11 NOTE — PROGRESS NOTES
Name: Brittany Villeda ADMIT: 2021   : 1935  PCP: Mega Esparza MD    MRN: [id number] LOS: 0 days   AGE/SEX: 86 y.o. female  ROOM: [facility]     Subjective   Subjective   Continues to complain of weakness of both thigh muscles bilaterally.  No double vision.  No loss of the vision.  No slurring of the speech.  No unilateral numbness or weakness.  No loss of consciousness.  No dizziness.  No joint pain or swelling.    Review of Systems    Cardiovascular/respiratory.  No chest pain.  No shortness of breath.  No palpitation.  GI.  No abdominal pain or nausea or vomiting.  .  No dysuria or hematuria.  No incontinence.     Objective   Objective   Vital Signs  Temp:  [98.3 °F (36.8 °C)-99 °F (37.2 °C)] 99 °F (37.2 °C)  Heart Rate:  [70-82] 80  Resp:  [18-20] 20  BP: (104-134)/(62-80) 118/66  SpO2:  [94 %-99 %] 95 %  on   ;   Device (Oxygen Therapy): room air    Intake/Output Summary (Last 24 hours) at 2021 1213  Last data filed at 2021 0035  Gross per 24 hour   Intake 530 ml   Output --   Net 530 ml     Body mass index is 36.9 kg/m².      21  0145 21  1106   Weight: 97.5 kg (215 lb) 97.5 kg (215 lb)     Physical Exam    General.  Elderly female.  Alert oriented x3 no apparent pain distress or diaphoresis normal mood and affect  Eyes.  Pupils equal round and reactive intact extraocular musculature no pallor or jaundice  Neck.  Supple no JVD no lymphadenopathy no thyromegaly  Cardiovascular.  Regular rate and rhythm with grade 2 systolic murmur.  Chest.  Clear to auscultation bilaterally with no added sounds  Abdomen.  Soft lax no tenderness no organomegaly no guarding or  Extremities.  No clubbing cyanosis or edema  CNS.  Proximal muscle weakness of both lower extremities and both upper extremities more so on the lower extremities than the upper(no change from yesterday) otherwise negative    Results Review:      Results from last 7 days   Lab Units 21  0431 04/10/21  0682  04/09/21  0725 04/08/21  0548 04/07/21  2335   SODIUM mmol/L 138 139 138 140 139   POTASSIUM mmol/L 4.1 4.3 4.3 4.7 4.4   CHLORIDE mmol/L 102 106 105 112* 106   CO2 mmol/L 24.3 26.2 26.8 21.0* 24.6   BUN mg/dL 11 11 10 9 12   CREATININE mg/dL 0.55* 0.63 0.65 0.52* 0.68   GLUCOSE mg/dL 86 82 85 76 108*   CALCIUM mg/dL 10.7* 10.4 10.2 8.4* 9.7   AST (SGOT) U/L 22 16  --   --  16   ALT (SGPT) U/L 8 8  --   --  9     Estimated Creatinine Clearance: 57.2 mL/min (A) (by C-G formula based on SCr of 0.55 mg/dL (L)).          Results from last 7 days   Lab Units 04/10/21  0642 04/07/21  2335   CK TOTAL U/L 194*  --    TROPONIN T ng/mL  --  <0.010     Results from last 7 days   Lab Units 04/07/21  2335   PROBNP pg/mL 1,228.0     Results from last 7 days   Lab Units 04/09/21  0725   TSH uIU/mL 0.509           Invalid input(s):  PHOS        Invalid input(s): LDLCALC  Results from last 7 days   Lab Units 04/11/21  0431 04/10/21  0642 04/09/21  0725 04/08/21  0912 04/07/21  2335 04/07/21  2335   WBC 10*3/mm3 4.85 3.73 3.30* 3.01*  --  3.64   HEMOGLOBIN g/dL 13.2 12.5 12.9 13.2  --  12.2   HEMATOCRIT % 41.1 38.1 38.3 38.5  --  37.7   PLATELETS 10*3/mm3 146 144 152 142  --  163   MCV fL 103.0* 98.4* 97.5* 97.5*   < > 99.0*   MCH pg 33.1* 32.3 32.8 33.4*   < > 32.0   MCHC g/dL 32.1 32.8 33.7 34.3   < > 32.4   RDW % 13.0 12.7 12.8 12.7   < > 13.1   RDW-SD fl 50.0 45.2 45.4 44.9   < > 46.8   MPV fL 9.5 9.5 9.3 9.1   < > 9.3   NEUTROPHIL % % 47.4  --  39.7*  --    < >  --    LYMPHOCYTE % % 35.5  --  45.5*  --    < >  --    MONOCYTES % % 14.0*  --  10.6  --    < >  --    EOSINOPHIL % % 2.5  --  3.0  --    < >  --    BASOPHIL % % 0.4  --  0.9  --    < >  --    IMM GRAN % % 0.2  --   --   --   --   --    NEUTROS ABS 10*3/mm3 2.30  --  1.31*  --    < > 1.26*   LYMPHS ABS 10*3/mm3 1.72  --  1.50  --    < >  --    MONOS ABS 10*3/mm3 0.68  --  0.35  --    < >  --    EOS ABS 10*3/mm3 0.12  --  0.10  --    < > 0.04   BASOS ABS 10*3/mm3 0.02  --   0.03  --    < > 0.04   IMMATURE GRANS (ABS) 10*3/mm3 0.01  --   --   --   --   --    NRBC /100 WBC 0.0  --   --   --   --  0.0    < > = values in this interval not displayed.     Results from last 7 days   Lab Units 04/11/21  0431 04/10/21  0642 04/09/21  0725 04/07/21  2335 04/05/21  0000   INR  1.99* 2.25* 2.21* 2.15* 2.40         Results from last 7 days   Lab Units 04/08/21  0548   PROCALCITONIN ng/mL 0.05             Results from last 7 days   Lab Units 04/08/21  0107   URINECX  >100,000 CFU/mL Escherichia coli*         Results from last 7 days   Lab Units 04/08/21  0107   NITRITE UA  Positive*   WBC UA /HPF 31-50*   BACTERIA UA /HPF 4+*   SQUAM EPITHEL UA /HPF 0-2   URINECX  >100,000 CFU/mL Escherichia coli*           Imaging:  Imaging Results (Last 24 Hours)     ** No results found for the last 24 hours. **             I reviewed the patient's new clinical results / labs / tests / procedures      Assessment/Plan     Active Hospital Problems    Diagnosis  POA   • **Generalized weakness [R53.1]  Yes   • Macrocytosis [D75.89]  Yes   • Hypercalcemia [E83.52]  No   • Dysautonomia (CMS/HCC) [G90.1]  Yes   • Weakness of both lower extremities [R29.898]  Yes   • Acute UTI (urinary tract infection) [N39.0]  Yes   • Chronic anticoagulation [Z79.01]  Not Applicable   • Immobility [Z74.09]  Yes   • Obesity (BMI 30-39.9) [E66.9]  Yes   • Atrial fibrillation, persistent (CMS/HCC) [I48.19]  Yes   • 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  Differential  diagnosis myopathy/cord abnormality/CNS demyelinating disease..  CK only mildly elevated will recheck.  Potassium is normal.  TSH is normal.  Will check magnesium..  Not on steroids or other medications that can cause myopathy.  Discussed with neurology yesterday.  Awaiting MRI of the lumbar spine.  PT and OT on board but patient has declined physical therapy yesterday she was encouraged to participate..  · E. coli UTI sensitive to doxycycline we will switch from Rocephin to doxycycline patient is asymptomatic.  · History of atrial fibrillation/diastolic congestive heart failure/hypertension tension/pulmonary hypertension.  BNP is normal.  Rate is controlled.  Blood pressure is controlled.  Currently on Coumadin with therapeutic INR.  Will monitor.  No signs of angina or congestive heart failure.  Echo with normal ejection fraction right atrial dilatation left atrial dilation and diastolic dysfunction.  Cardiology okayed MRI.  · History of ulcerative colitis.  Continue Protonix and mesalamine.  Stable with negative GI examination.  · History of obstructive sleep apnea.  Nocturnal oxygen.  · Hyperbilirubinemia.  Intermittent problem.  Suspect Gilbert's disease. Benign GI examination.  Will monitor.  · Hypercalcemia.  Will check ionized calcium.  Start slow IV fluid.  And stop p.o. calcium and vitamin D.  · Macrocytosis.  Normal B12 and TSH.  Will check cortisol and folic acid.    · Discussed with patient.      Cedric Cee MD  [facility]  04/11/21  12:13 EDT

## 2021-04-11 NOTE — PLAN OF CARE
Goal Outcome Evaluation:  Plan of Care Reviewed With: patient  Progress: no change  Outcome Summary: All needs met this shift. Rested well. Incontinence care provided. Rested well. VSS. On room air. V-paced on the monitor. Complained of pain, Tylenol given. Will CTM.

## 2021-04-12 ENCOUNTER — APPOINTMENT (OUTPATIENT)
Dept: CT IMAGING | Facility: HOSPITAL | Age: 86
End: 2021-04-12

## 2021-04-12 LAB
25(OH)D3 SERPL-MCNC: 34.1 NG/ML (ref 30–100)
ALBUMIN SERPL-MCNC: 3.5 G/DL (ref 3.5–5.2)
ALBUMIN/GLOB SERPL: 1 G/DL
ALP SERPL-CCNC: 80 U/L (ref 39–117)
ALT SERPL W P-5'-P-CCNC: 11 U/L (ref 1–33)
ANION GAP SERPL CALCULATED.3IONS-SCNC: 8.2 MMOL/L (ref 5–15)
AST SERPL-CCNC: 17 U/L (ref 1–32)
BILIRUB SERPL-MCNC: 1.9 MG/DL (ref 0–1.2)
BUN SERPL-MCNC: 12 MG/DL (ref 8–23)
BUN/CREAT SERPL: 21.8 (ref 7–25)
CALCIUM SPEC-SCNC: 10 MG/DL (ref 8.6–10.5)
CALCIUM SPEC-SCNC: 10 MG/DL (ref 8.6–10.5)
CHLORIDE SERPL-SCNC: 105 MMOL/L (ref 98–107)
CO2 SERPL-SCNC: 23.8 MMOL/L (ref 22–29)
CORTIS SERPL-MCNC: 9.44 MCG/DL
CREAT SERPL-MCNC: 0.55 MG/DL (ref 0.57–1)
DEPRECATED RDW RBC AUTO: 43.1 FL (ref 37–54)
ERYTHROCYTE [DISTWIDTH] IN BLOOD BY AUTOMATED COUNT: 12.5 % (ref 12.3–15.4)
ERYTHROCYTE [SEDIMENTATION RATE] IN BLOOD: 43 MM/HR (ref 0–30)
FOLATE SERPL-MCNC: 7.81 NG/ML (ref 4.78–24.2)
GFR SERPL CREATININE-BSD FRML MDRD: 127 ML/MIN/1.73
GLOBULIN UR ELPH-MCNC: 3.5 GM/DL
GLUCOSE SERPL-MCNC: 95 MG/DL (ref 65–99)
HCT VFR BLD AUTO: 36.8 % (ref 34–46.6)
HGB BLD-MCNC: 12.9 G/DL (ref 12–15.9)
INR PPP: 1.97 (ref 0.9–1.1)
MAGNESIUM SERPL-MCNC: 1.5 MG/DL (ref 1.6–2.4)
MCH RBC QN AUTO: 33.6 PG (ref 26.6–33)
MCHC RBC AUTO-ENTMCNC: 35.1 G/DL (ref 31.5–35.7)
MCV RBC AUTO: 95.8 FL (ref 79–97)
PLATELET # BLD AUTO: 165 10*3/MM3 (ref 140–450)
PMV BLD AUTO: 9.5 FL (ref 6–12)
POTASSIUM SERPL-SCNC: 4.2 MMOL/L (ref 3.5–5.2)
PROT SERPL-MCNC: 7 G/DL (ref 6–8.5)
PROTHROMBIN TIME: 22.2 SECONDS (ref 11.7–14.2)
PTH-INTACT SERPL-MCNC: 67.9 PG/ML (ref 15–65)
QT INTERVAL: 443 MS
RBC # BLD AUTO: 3.84 10*6/MM3 (ref 3.77–5.28)
RPR SER QL: NORMAL
SODIUM SERPL-SCNC: 137 MMOL/L (ref 136–145)
WBC # BLD AUTO: 4.4 10*3/MM3 (ref 3.4–10.8)

## 2021-04-12 PROCEDURE — 82746 ASSAY OF FOLIC ACID SERUM: CPT | Performed by: INTERNAL MEDICINE

## 2021-04-12 PROCEDURE — 85027 COMPLETE CBC AUTOMATED: CPT | Performed by: INTERNAL MEDICINE

## 2021-04-12 PROCEDURE — 93005 ELECTROCARDIOGRAM TRACING: CPT | Performed by: INTERNAL MEDICINE

## 2021-04-12 PROCEDURE — 85652 RBC SED RATE AUTOMATED: CPT | Performed by: INTERNAL MEDICINE

## 2021-04-12 PROCEDURE — 82306 VITAMIN D 25 HYDROXY: CPT | Performed by: INTERNAL MEDICINE

## 2021-04-12 PROCEDURE — 96361 HYDRATE IV INFUSION ADD-ON: CPT

## 2021-04-12 PROCEDURE — 25010000002 ZOLEDRONIC ACID PER 1 MG: Performed by: INTERNAL MEDICINE

## 2021-04-12 PROCEDURE — 82533 TOTAL CORTISOL: CPT | Performed by: INTERNAL MEDICINE

## 2021-04-12 PROCEDURE — G0378 HOSPITAL OBSERVATION PER HR: HCPCS

## 2021-04-12 PROCEDURE — 82308 ASSAY OF CALCITONIN: CPT | Performed by: INTERNAL MEDICINE

## 2021-04-12 PROCEDURE — 96375 TX/PRO/DX INJ NEW DRUG ADDON: CPT

## 2021-04-12 PROCEDURE — 83970 ASSAY OF PARATHORMONE: CPT | Performed by: INTERNAL MEDICINE

## 2021-04-12 PROCEDURE — 99214 OFFICE O/P EST MOD 30 MIN: CPT | Performed by: NURSE PRACTITIONER

## 2021-04-12 PROCEDURE — 36415 COLL VENOUS BLD VENIPUNCTURE: CPT | Performed by: INTERNAL MEDICINE

## 2021-04-12 PROCEDURE — 86334 IMMUNOFIX E-PHORESIS SERUM: CPT | Performed by: INTERNAL MEDICINE

## 2021-04-12 PROCEDURE — 80053 COMPREHEN METABOLIC PANEL: CPT | Performed by: INTERNAL MEDICINE

## 2021-04-12 PROCEDURE — 82784 ASSAY IGA/IGD/IGG/IGM EACH: CPT | Performed by: INTERNAL MEDICINE

## 2021-04-12 PROCEDURE — 74176 CT ABD & PELVIS W/O CONTRAST: CPT

## 2021-04-12 PROCEDURE — 93010 ELECTROCARDIOGRAM REPORT: CPT | Performed by: INTERNAL MEDICINE

## 2021-04-12 PROCEDURE — 86592 SYPHILIS TEST NON-TREP QUAL: CPT | Performed by: NURSE PRACTITIONER

## 2021-04-12 PROCEDURE — 83735 ASSAY OF MAGNESIUM: CPT | Performed by: INTERNAL MEDICINE

## 2021-04-12 PROCEDURE — 85610 PROTHROMBIN TIME: CPT | Performed by: INTERNAL MEDICINE

## 2021-04-12 PROCEDURE — 71250 CT THORAX DX C-: CPT

## 2021-04-12 RX ORDER — LORAZEPAM 2 MG/ML
1 INJECTION INTRAMUSCULAR ONCE
Status: DISCONTINUED | OUTPATIENT
Start: 2021-04-12 | End: 2021-04-13

## 2021-04-12 RX ORDER — LACTULOSE 10 G/15ML
20 SOLUTION ORAL 2 TIMES DAILY
Status: DISCONTINUED | OUTPATIENT
Start: 2021-04-12 | End: 2021-04-15 | Stop reason: HOSPADM

## 2021-04-12 RX ADMIN — DOXYCYCLINE 100 MG: 100 CAPSULE ORAL at 20:39

## 2021-04-12 RX ADMIN — Medication 1 EACH: at 08:58

## 2021-04-12 RX ADMIN — ZOLEDRONIC ACID 3.5 MG: 4 INJECTION, SOLUTION, CONCENTRATE INTRAVENOUS at 11:37

## 2021-04-12 RX ADMIN — SODIUM CHLORIDE, PRESERVATIVE FREE 10 ML: 5 INJECTION INTRAVENOUS at 20:40

## 2021-04-12 RX ADMIN — Medication 1000 MCG: at 08:58

## 2021-04-12 RX ADMIN — WARFARIN 5 MG: 5 TABLET ORAL at 20:39

## 2021-04-12 RX ADMIN — LACTULOSE 20 G: 20 SOLUTION ORAL at 12:37

## 2021-04-12 RX ADMIN — ACETAMINOPHEN 650 MG: 325 TABLET ORAL at 08:58

## 2021-04-12 RX ADMIN — PANTOPRAZOLE SODIUM 40 MG: 40 TABLET, DELAYED RELEASE ORAL at 06:12

## 2021-04-12 RX ADMIN — DOXYCYCLINE 100 MG: 100 CAPSULE ORAL at 08:58

## 2021-04-12 RX ADMIN — MESALAMINE 1.5 G: 0.38 CAPSULE, EXTENDED RELEASE ORAL at 08:58

## 2021-04-12 RX ADMIN — SODIUM CHLORIDE, PRESERVATIVE FREE 10 ML: 5 INJECTION INTRAVENOUS at 08:58

## 2021-04-12 NOTE — PROGRESS NOTES
"DOS: 2021  NAME: Brittany Villeda   : 1935  PCP: Mega Esparza MD  Chief Complaint   Patient presents with   • Shortness of Breath     Neruology    Subjective: Daughters at the bedside and provides additional history.  Her daughter endorses history obtained from the patient earlier today as below.  They report acute inability to walk starting in November with some improvement after working with PT but acute worsening April 3 after sitting in recliner for several hours.  Patient complains of ongoing leg weakness as well as upper extremity weakness but also notes she has rotator cuff issues in both surgeries and she has an acute pain in her right elbow today, denies injury.  Pt seen in follow up today, however the problem is new to the examiner.      Objective:  Vital signs: /74 (BP Location: Left arm, Patient Position: Lying)   Pulse 79   Temp 97.5 °F (36.4 °C) (Oral)   Resp 18   Ht 162.6 cm (64\")   Wt 97.5 kg (215 lb)   LMP  (LMP Unknown)   SpO2 99%   BMI 36.90 kg/m²       General appearance: Well developed, well nourished, well groomed, alert and cooperative.  Obese.    HEENT: Normocephalic.   Neck and spine: Neck supple, no nuchal rigidity.  Cardiac: Regular rate and rhythm.  Peripheral Vasculature: Radial pulses are equal and symmetric.  Chest Exam: Clear to auscultation bilaterally, no wheezes, no rhonchi.  Extremities: Normal, no edema.   Skin: No rashes or birthmarks.     Higher integrative function: Oriented to time, place, person, intact recent and remote memory, attention span, concentration and language. Spontaneous speech, fund of vocabulary are normal.   CN II: Normal visual fields.   CN III IV VI: Extraocular movements are full without nystagmus. Pupils are equal, round, and reactive to light.   CN V: Normal facial sensation.  CN VII: Facial movements are symmetric, no weakness.   CN VIII: Auditory acuity is normal.   CN IX & X: Symmetric palatal movement.   CN XI: " Sternocleidomastoid and trapezius are normal. No weakness.   CN XII: The tongue is midline. No atrophy or fasciculations.   Motor: Bilateral upper extremity with no resistance in deltoids.  Right elbow extension and flexion with giveaway weakness due to pain, otherwise upper extremities are 4 out of 5.  Lower extremities are approximately 4- out of 5 throughout but patient unable to raise lower extremities against gravity.  No fasciculations, rigidity, spasticity or abnormal movements.   Sensation: Intact to light touch, cool temperature, and vibration in upper and lower extremities.  Station and gait: Patient nonambulatory.  Muscle stretch reflexes: Reflexes are normal in upper extremities, trace to areflexive in lower extremities.  Plantar reflexes are flexor bilaterally.   Coordination: Difficulty completing finger-to-nose due to proximal weakness.      Scheduled Meds:docusate sodium, 100 mg, Oral, Nightly  doxycycline, 100 mg, Oral, Q12H  lactulose, 20 g, Oral, BID  LORazepam, 1 mg, Intravenous, Once  mesalamine, 1.5 g, Oral, Daily  pantoprazole, 40 mg, Oral, QAM  sodium chloride, 10 mL, Intravenous, Q12H  vitamin B-12, 1,000 mcg, Oral, Daily  warfarin, 2.5 mg, Oral, Once per day on Sun Tue Wed Fri  warfarin, 5 mg, Oral, Once per day on Mon Thu Sat  wheat dextrin, 1 each, Oral, Daily      Continuous Infusions:Pharmacy Consult - Pharmacy to dose,   Pharmacy to dose warfarin,   sodium chloride, 75 mL/hr, Last Rate: 75 mL/hr (04/11/21 2134)      PRN Meds:.•  acetaminophen **OR** acetaminophen **OR** acetaminophen  •  albuterol sulfate HFA  •  calcium carbonate  •  ondansetron **OR** ondansetron  •  Pharmacy Consult - Pharmacy to dose  •  Pharmacy to dose warfarin  •  sodium chloride    Laboratory results:  Lab Results   Component Value Date    GLUCOSE 95 04/12/2021    CALCIUM 10.0 04/12/2021     04/12/2021    K 4.2 04/12/2021    CO2 23.8 04/12/2021     04/12/2021    BUN 12 04/12/2021    CREATININE 0.55  (L) 04/12/2021    EGFRIFAFRI 127 04/12/2021    EGFRIFNONA 48 (L) 06/19/2020    BCR 21.8 04/12/2021    ANIONGAP 8.2 04/12/2021     Lab Results   Component Value Date    WBC 4.40 04/12/2021    HGB 12.9 04/12/2021    HCT 36.8 04/12/2021    MCV 95.8 04/12/2021     04/12/2021     Lab Results   Component Value Date    CHOL 141 12/25/2017     Lab Results   Component Value Date    HDL 64 (H) 12/25/2017     Lab Results   Component Value Date    LDL 66 12/25/2017     Lab Results   Component Value Date    TRIG 55 12/25/2017          ECG 4/12 tracings viewed by me, shows V-paced rhythm.  Review and interpretation of imaging: CT head images viewed by me, no acute findings seen.  CT HEAD WITHOUT CONTRAST     HISTORY: Weakness     COMPARISON: 01/26/2020     TECHNIQUE: Axial CT imaging was obtained through the brain. No IV  contrast was administered.     FINDINGS:  No acute intracranial hemorrhage is seen. There is mild atrophy. There  is periventricular and deep white matter microangiopathic change. There  is no midline shift or mass effect.     Bilateral basal ganglia calcifications are seen. The paranasal sinuses  and mastoid air cells appear clear.     IMPRESSION:  No acute intracranial findings.     Radiation dose reduction techniques were utilized, including automated  exposure control and exposure modulation based on body size.     This report was finalized on 4/8/2021 3:34 AM by Dr. Luna Oleary M.D.       Impression:  This patient is an 86-year-old female, former smoker, with HTN, hypothyroidism, PAF on warfarin, s/p PPM, HUGO, COPD, ulcerative colitis, previous kidney stone, and MGUS who presented 4/7 with shortness of breath and weakness.  The patient tells me she was admitted in November 2020 with difficulty walking.  Per quick review of chart at that time patient was found to have hemarthrosis and an ankle injury.  She states she was bedridden for some time after that discharge.  She has recently been  working with physical therapy and is now able to ambulate from her bedroom to the living room with assist x2 and a walker.  Starting Saturday she has had worsening weakness in bilateral lower extremities, shortness of air, and lightheadedness prompting presentation to the ED.  She does note some low back pain as well as left groin discomfort with pains radiating into the left lower extremity.  She describes some nonpainful right lower extremity jerking/spasm.  INR was 2.15 on admission, UA positive (started on rocephin), chest x-ray and CT head were negative for acute findings.  She was initially evaluated by Dr. Daniels who has ordered an MRI of the lumbar spine.  She also notes upper extremity weakness and difficulty feeding herself.  She is currently being worked up for hypercalcemia.    Work-up:  CT head: No acute findings.  Mild atrophy and small vessel disease noted.  2D echo: EF 60%, severely dilated left atrial cavity size, no spontaneous echo contrast, saline test results negative.  Labs: Folate 7.8, PTH 67.9, TSH 0.54, INR 1.97 4/12, B12 1355, sedimentation rate 43, /211, Covid testing negative this admission      Diagnoses:  Proximal muscle weakness-subacute.  Etiology not clear. CKs are only mildly elevated arguing against myopathy.  May be a combination of deconditioning plus or minus myelopathy.  E. coli UTI  Hypercalcemia  History of A. fib on warfarin  History of HUGO  History of ulcerative colitis    Plan:  MRI L-spine ordered, will add MRI T and C-spine.  Check additional labs for treatable causes of neuropathy, lupus panel, repeat CK in a.m.  May need to consider EMG/NCS.  Patient was seen with Dr. Cherry today.  We will follow.

## 2021-04-12 NOTE — PLAN OF CARE
Goal Outcome Evaluation:  Plan of Care Reviewed With: patient  Progress: no change  Outcome Summary: Pt still waiting to get MRI completed- MRI called regarding what they need to complete- they stated there was a specific form that they needed from cardiology and had already notified them; pt also had CT of abdomen and chest done; Pt is assist x2; does of IV zometa given; tylenol given once for left sided pain, IV fluids continued; pt not eating much; EKG also done.

## 2021-04-12 NOTE — PROGRESS NOTES
Name: Brittany Villeda ADMIT: 2021   : 1935  PCP: Mega Esparza MD    MRN: [id number] LOS: 0 days   AGE/SEX: 86 y.o. female  ROOM: [facility]     Subjective   Subjective   No change of weakness of both thigh muscles/upper extremity bilaterally.  Positive myalgia..  No double vision.  No loss of the vision.  No slurring of the speech.  No unilateral numbness or weakness.  No loss of consciousness.  No dizziness.  No joint pain or swelling.    Review of Systems    Cardiovascular/respiratory.  No chest pain.  No shortness of breath.  No palpitation.  GI.  No abdominal pain or nausea or vomiting.  No bowel movement for a few days.  .  No dysuria or hematuria.  No incontinence.     Objective   Objective   Vital Signs  Temp:  [97.5 °F (36.4 °C)-98.2 °F (36.8 °C)] 97.5 °F (36.4 °C)  Heart Rate:  [70-79] 79  Resp:  [18-20] 18  BP: (106-135)/(65-83) 106/74  SpO2:  [97 %-99 %] 99 %  on   ;   Device (Oxygen Therapy): room air  No intake or output data in the 24 hours ending 21 1015  Body mass index is 36.9 kg/m².      21  0145 21  1106   Weight: 97.5 kg (215 lb) 97.5 kg (215 lb)     Physical Exam    General.  Elderly female.  Alert oriented x3 no apparent pain distress or diaphoresis normal mood and affect  Eyes.  Pupils equal round and reactive intact extraocular musculature no pallor or jaundice  Neck.  Supple no JVD no lymphadenopathy no thyromegaly  Cardiovascular.  Regular rate and rhythm with grade 2 systolic murmur.  Chest.  Clear to auscultation bilaterally with no added sounds  Abdomen.  Soft lax no tenderness no organomegaly no guarding or  Extremities.  No clubbing cyanosis or edema  CNS.  Proximal muscle weakness of both lower extremities and both upper extremities more so on the lower extremities than the upper(no change from yesterday) otherwise negative    Results Review:      Results from last 7 days   Lab Units 21  0645 21  0431 04/10/21  0642 21  0706  04/08/21  0548 04/07/21  2335   SODIUM mmol/L 137 138 139 138 140 139   POTASSIUM mmol/L 4.2 4.1 4.3 4.3 4.7 4.4   CHLORIDE mmol/L 105 102 106 105 112* 106   CO2 mmol/L 23.8 24.3 26.2 26.8 21.0* 24.6   BUN mg/dL 12 11 11 10 9 12   CREATININE mg/dL 0.55* 0.55* 0.63 0.65 0.52* 0.68   GLUCOSE mg/dL 95 86 82 85 76 108*   CALCIUM mg/dL 10.0 10.7* 10.4 10.2 8.4* 9.7   AST (SGOT) U/L 17 22 16  --   --  16   ALT (SGPT) U/L 11 8 8  --   --  9     Estimated Creatinine Clearance: 57.2 mL/min (A) (by C-G formula based on SCr of 0.55 mg/dL (L)).          Results from last 7 days   Lab Units 04/11/21  1247 04/10/21  0642 04/07/21  2335   CK TOTAL U/L 211* 194*  --    TROPONIN T ng/mL  --   --  <0.010     Results from last 7 days   Lab Units 04/07/21  2335   PROBNP pg/mL 1,228.0     Results from last 7 days   Lab Units 04/11/21  1247 04/09/21  0725   TSH uIU/mL 0.544 0.509     Results from last 7 days   Lab Units 04/11/21  1247   MAGNESIUM mg/dL 1.6           Invalid input(s): LDLCALC  Results from last 7 days   Lab Units 04/12/21  0645 04/11/21  0431 04/10/21  0642 04/09/21  0725 04/08/21  0912 04/07/21  2335 04/07/21  2335   WBC 10*3/mm3 4.40 4.85 3.73 3.30* 3.01*  --  3.64   HEMOGLOBIN g/dL 12.9 13.2 12.5 12.9 13.2  --  12.2   HEMATOCRIT % 36.8 41.1 38.1 38.3 38.5  --  37.7   PLATELETS 10*3/mm3 165 146 144 152 142  --  163   MCV fL 95.8 103.0* 98.4* 97.5* 97.5*   < > 99.0*   MCH pg 33.6* 33.1* 32.3 32.8 33.4*   < > 32.0   MCHC g/dL 35.1 32.1 32.8 33.7 34.3   < > 32.4   RDW % 12.5 13.0 12.7 12.8 12.7   < > 13.1   RDW-SD fl 43.1 50.0 45.2 45.4 44.9   < > 46.8   MPV fL 9.5 9.5 9.5 9.3 9.1   < > 9.3   NEUTROPHIL % %  --  47.4  --  39.7*  --    < >  --    LYMPHOCYTE % %  --  35.5  --  45.5*  --    < >  --    MONOCYTES % %  --  14.0*  --  10.6  --    < >  --    EOSINOPHIL % %  --  2.5  --  3.0  --    < >  --    BASOPHIL % %  --  0.4  --  0.9  --    < >  --    IMM GRAN % %  --  0.2  --   --   --   --   --    NEUTROS ABS 10*3/mm3  --   2.30  --  1.31*  --    < > 1.26*   LYMPHS ABS 10*3/mm3  --  1.72  --  1.50  --    < >  --    MONOS ABS 10*3/mm3  --  0.68  --  0.35  --    < >  --    EOS ABS 10*3/mm3  --  0.12  --  0.10  --    < > 0.04   BASOS ABS 10*3/mm3  --  0.02  --  0.03  --    < > 0.04   IMMATURE GRANS (ABS) 10*3/mm3  --  0.01  --   --   --   --   --    NRBC /100 WBC  --  0.0  --   --   --   --  0.0    < > = values in this interval not displayed.     Results from last 7 days   Lab Units 04/12/21  0645 04/11/21  0431 04/10/21  0642 04/09/21  0725 04/07/21  2335   INR  1.97* 1.99* 2.25* 2.21* 2.15*         Results from last 7 days   Lab Units 04/08/21  0548   PROCALCITONIN ng/mL 0.05             Results from last 7 days   Lab Units 04/08/21  0107   URINECX  >100,000 CFU/mL Escherichia coli*         Results from last 7 days   Lab Units 04/08/21  0107   NITRITE UA  Positive*   WBC UA /HPF 31-50*   BACTERIA UA /HPF 4+*   SQUAM EPITHEL UA /HPF 0-2   URINECX  >100,000 CFU/mL Escherichia coli*           Imaging:  Imaging Results (Last 24 Hours)     ** No results found for the last 24 hours. 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  Differential diagnosis myopathy/cord abnormality/CNS demyelinating disease..  CK only mildly elevated..  Potassium is normal.  TSH is normal.  Awaiting magnesium..  Not on steroids or other medications that can cause myopathy..  Awaiting MRI of the lumbar spine.  PT and OT on board and for MRI of the lumbar spine is negative we will let neurology decide if the patient needs an MRI of the brain/C/T spine.  If all labs are negative this is mostly a myopathy because of hypercalcemia.  · E. coli UTI sensitive to doxycycline we will switch from Rocephin to doxycycline patient is asymptomatic.  · History of atrial fibrillation/diastolic congestive heart failure/hypertension tension/pulmonary hypertension.  BNP is normal.  Rate is controlled.  Blood pressure is controlled.  Currently on Coumadin with therapeutic INR.  Will monitor.  No signs of angina or congestive heart failure.  Echo with normal ejection fraction right atrial dilatation left atrial dilation and diastolic dysfunction.  Cardiology okayed MRI.  · History of ulcerative colitis.  Continue Protonix and mesalamine.  Stable with negative GI examination.  · History of obstructive sleep apnea.  Nocturnal oxygen.  · Hyperbilirubinemia.  Intermittent problem.  Suspect Gilbert's disease. Benign GI examination.  Will monitor.  · Hypercalcemia.  Elevated ionized calcium.  Holding slow IV fluid.  . calcium and vitamin D DC'd.  Will check parathyroid hormone/calcitonin/serum protein immunofixation/ESR/CT chest, abdomen, pelvis/vitamin D to rule out cancer, multiple myeloma, vitamin D toxicity hyperparathyroidism.  Will initiate Zometa.  Will monitor..  · Macrocytosis.  Normal B12/folate/cortisol and TSH.   · Constipation.  Continue Colace  and add lactulose  · Discussed with patient/nurse.      Cedric Cee MD  [facility]  04/12/21  10:15 EDT

## 2021-04-12 NOTE — PROGRESS NOTES
Pharmacy Consult: Warfarin Dosing/ Monitoring    Brittany Villeda is a 86 y.o. female, estimated creatinine clearance is 57.2 mL/min (by C-G formula based on SCr of 0.65 mg/dL). weighing 97.5 kg (215 lb).    PMH: Acute UTI (2021), Anemia, Arrhythmia, Arthritis, Asthma, Bradycardia, Chest pain, Colitis, COPD, Diastolic dysfunction, Essential hypertension (2016), GERD, Heart block, Hypertension, Hypertensive heart disease, Hyperthyroidism, Kidney stone, Leukopenia, Low back pain, MGUS (monoclonal gammopathy of unknown significance), Nephrolithiasis, Obesity, Paroxysmal atrial fibrillation, Peptic ulceration, PSVT (paroxysmal supraventricular tachycardia), Pulmonary hypertension, Rectal bleed, Sleep apnea, Trifascicular block, Ulcerative rectosigmoiditis without complication, Ventricular tachycardia, and Vertigo.    Social History     Tobacco Use    Smoking status: Former Smoker     Packs/day: 1.50     Years: 10.00     Pack years: 15.00     Start date:      Quit date:      Years since quittin.3    Smokeless tobacco: Never Used    Tobacco comment: QUIT SMOKING    Substance Use Topics    Alcohol use: No     Comment: Daily caffeine use - one cup of coffee    Drug use: Defer     Results from last 7 days   Lab Units 04/11/21  0431 04/10/21  0642 04/09/21  0721  0912 21  2335 21  0000   INR  1.99* 2.25* 2.21*  --  2.15* 2.40   HEMOGLOBIN g/dL 13.2 12.5 12.9 13.2 12.2  --    HEMATOCRIT % 41.1 38.1 38.3 38.5 37.7  --    PLATELETS 10*3/mm3 146 144 152 142 163  --      Results from last 7 days   Lab Units 21  0431 04/10/21  0642 21  0725 21  2335   SODIUM mmol/L 138 139 138 139   POTASSIUM mmol/L 4.1 4.3 4.3 4.4   CHLORIDE mmol/L 102 106 105 106   CO2 mmol/L 24.3 26.2 26.8 24.6   BUN mg/dL 11 11 10 12   CREATININE mg/dL 0.55* 0.63 0.65 0.68   CALCIUM mg/dL 10.7* 10.4 10.2 9.7   BILIRUBIN mg/dL 1.9* 1.7*  --  0.6   ALK PHOS U/L 77 67  --  93   ALT (SGPT) U/L 8 8   --  9   AST (SGOT) U/L 22 16  --  16   GLUCOSE mg/dL 86 82 85 108*     Anticoagulation history: Warfarin therapy is managed outpatient by the [facility]. Patient takes Warfarin 5 mg po q Mon,Thu,Sat and 2.5 mg all other days    Hospital Anticoagulation:  Consulting provider: SHAHID Randall  Start date: 4/8/21 continued from home  Indication: Atrial fibrillation  Target INR: 2.0-3.0  Expected duration: Indefinite   Bridge Therapy: No                  Date 4/8 4/9  4/10  4/11 4/12        INR 2.15 2.21  2.25 1.99 1.97        Warfarin dose 5 mg 2.5 mg  5 mg  2.5 mg 5 mg          Potential drug interactions:  1) Ceftriaxone - may enhance the effect of warfarin  2) Acetaminophen - may result in an increased risk of bleeding.   3) Doxycycline - may result in an increased risk of bleeding.   4) Mesalamine - may result in decreased warfarin efficacy.   5) Pantoprazole - may result in increased International Normalized Ratio (INR) and prothrombin time.     Relevant nutrition status: Regular, cardiac diet    Other: Albumin: 3.50 g/dL (4/12/21)    Education complete?/ Date: [facility]    Assessment/Plan:  Continue outpatient warfarin regimen: Warfarin 5 mg po q Mon,Thu,Sat and 2.5 mg all other days  Monitor for signs/symptoms of bleeding  Follow up INR tomorrow    Pharmacy will continue to follow until discharge or discontinuation of warfarin.     Moraima Cabrales [facility]  Clinical Staff Pharmacist

## 2021-04-12 NOTE — PROGRESS NOTES
Continued Stay Note  Psychiatric     Patient Name: Brittany Villeda  MRN: [id number]  Today's Date: 4/12/2021    Admit Date: 4/7/2021    Discharge Plan     Row Name 04/12/21 1439       Plan    Plan  return home with family and Court[facility] @ [facility]    Patient/Family in Agreement with Plan  yes    Plan Comments  Spoke with patient at bedside.  She is currently waiting to have a MRI.  Asked about PT, and patient stated that she has been unable to work with them.  Discussed SNF, but patient still wanting to return home with family and get therapy in her home.  Plan remains for patient to return home with Cathleen @ [facility] to follow. Katherine Meek RN        Discharge Codes    No documentation.             Katherine Meek RN

## 2021-04-12 NOTE — PLAN OF CARE
Goal Outcome Evaluation:  Plan of Care Reviewed With: patient  Progress: no change   Patient's VSS.  She refused to participate in PT today.  MRI spine without contrast is planned for 0600 tomorrow, when the rep from [employer] will be available/present.  16-point checklist has been faxed to MRI.  RN to administer 1mg IV Ativan prior to MRI, per Dr. Daniels's order.

## 2021-04-12 NOTE — PLAN OF CARE
Problem: Adult Inpatient Plan of Care  Goal: Plan of Care Review  Outcome: Ongoing, Progressing  Flowsheets (Taken 4/12/2021 9302)  Plan of Care Reviewed With: patient  Outcome Summary: Pt rested well overnight. Tylenol given for complaints of pain. IV fluids. Incontinence care provided. MRI of the spine this a.m.VSS, will continue to monitor.

## 2021-04-12 NOTE — SIGNIFICANT NOTE
04/12/21 1513   OTHER   Discipline physical therapy assistant   Rehab Time/Intention   Session Not Performed patient unavailable for treatment  (pt was checked on in AM and declined d/t dizziness, then off floor when checked on in PM; PT will f/u tomorrow)   Recommendation   PT - Next Appointment 04/13/21

## 2021-04-13 ENCOUNTER — APPOINTMENT (OUTPATIENT)
Dept: GENERAL RADIOLOGY | Facility: HOSPITAL | Age: 86
End: 2021-04-13

## 2021-04-13 ENCOUNTER — APPOINTMENT (OUTPATIENT)
Dept: MRI IMAGING | Facility: HOSPITAL | Age: 86
End: 2021-04-13

## 2021-04-13 PROBLEM — E83.42 HYPOMAGNESEMIA: Status: ACTIVE | Noted: 2021-04-13

## 2021-04-13 PROBLEM — M19.031 ARTHRITIS OF RIGHT WRIST: Status: ACTIVE | Noted: 2021-04-13

## 2021-04-13 LAB
CA-I BLD-MCNC: 6 MG/DL (ref 4.6–5.4)
CA-I SERPL ISE-MCNC: 1.5 MMOL/L (ref 1.15–1.35)
CK SERPL-CCNC: 194 U/L (ref 20–180)
CRP SERPL-MCNC: 8.2 MG/DL (ref 0–0.5)
ERYTHROCYTE [SEDIMENTATION RATE] IN BLOOD: 41 MM/HR (ref 0–30)
HBA1C MFR BLD: 4.6 % (ref 4.8–5.6)
INR PPP: 2.17 (ref 0.9–1.1)
PROTHROMBIN TIME: 23.9 SECONDS (ref 11.7–14.2)
URATE SERPL-MCNC: 4.9 MG/DL (ref 2.4–5.7)

## 2021-04-13 PROCEDURE — 82784 ASSAY IGA/IGD/IGG/IGM EACH: CPT | Performed by: NURSE PRACTITIONER

## 2021-04-13 PROCEDURE — 86235 NUCLEAR ANTIGEN ANTIBODY: CPT | Performed by: INTERNAL MEDICINE

## 2021-04-13 PROCEDURE — 82175 ASSAY OF ARSENIC: CPT | Performed by: NURSE PRACTITIONER

## 2021-04-13 PROCEDURE — 84165 PROTEIN E-PHORESIS SERUM: CPT | Performed by: NURSE PRACTITIONER

## 2021-04-13 PROCEDURE — A9577 INJ MULTIHANCE: HCPCS | Performed by: INTERNAL MEDICINE

## 2021-04-13 PROCEDURE — 36415 COLL VENOUS BLD VENIPUNCTURE: CPT | Performed by: INTERNAL MEDICINE

## 2021-04-13 PROCEDURE — 86147 CARDIOLIPIN ANTIBODY EA IG: CPT | Performed by: NURSE PRACTITIONER

## 2021-04-13 PROCEDURE — 86146 BETA-2 GLYCOPROTEIN ANTIBODY: CPT | Performed by: NURSE PRACTITIONER

## 2021-04-13 PROCEDURE — 86140 C-REACTIVE PROTEIN: CPT | Performed by: NURSE PRACTITIONER

## 2021-04-13 PROCEDURE — 83036 HEMOGLOBIN GLYCOSYLATED A1C: CPT | Performed by: NURSE PRACTITIONER

## 2021-04-13 PROCEDURE — 85652 RBC SED RATE AUTOMATED: CPT | Performed by: NURSE PRACTITIONER

## 2021-04-13 PROCEDURE — 72141 MRI NECK SPINE W/O DYE: CPT

## 2021-04-13 PROCEDURE — 82330 ASSAY OF CALCIUM: CPT | Performed by: INTERNAL MEDICINE

## 2021-04-13 PROCEDURE — G0378 HOSPITAL OBSERVATION PER HR: HCPCS

## 2021-04-13 PROCEDURE — 84155 ASSAY OF PROTEIN SERUM: CPT | Performed by: NURSE PRACTITIONER

## 2021-04-13 PROCEDURE — 83825 ASSAY OF MERCURY: CPT | Performed by: NURSE PRACTITIONER

## 2021-04-13 PROCEDURE — 84446 ASSAY OF VITAMIN E: CPT | Performed by: NURSE PRACTITIONER

## 2021-04-13 PROCEDURE — 85670 THROMBIN TIME PLASMA: CPT | Performed by: NURSE PRACTITIONER

## 2021-04-13 PROCEDURE — 85610 PROTHROMBIN TIME: CPT | Performed by: INTERNAL MEDICINE

## 2021-04-13 PROCEDURE — 72146 MRI CHEST SPINE W/O DYE: CPT

## 2021-04-13 PROCEDURE — 73110 X-RAY EXAM OF WRIST: CPT

## 2021-04-13 PROCEDURE — 85613 RUSSELL VIPER VENOM DILUTED: CPT | Performed by: NURSE PRACTITIONER

## 2021-04-13 PROCEDURE — 96376 TX/PRO/DX INJ SAME DRUG ADON: CPT

## 2021-04-13 PROCEDURE — 85597 PHOSPHOLIPID PLTLT NEUTRALIZ: CPT | Performed by: NURSE PRACTITIONER

## 2021-04-13 PROCEDURE — 85610 PROTHROMBIN TIME: CPT | Performed by: NURSE PRACTITIONER

## 2021-04-13 PROCEDURE — 99212 OFFICE O/P EST SF 10 MIN: CPT | Performed by: NURSE PRACTITIONER

## 2021-04-13 PROCEDURE — 86334 IMMUNOFIX E-PHORESIS SERUM: CPT | Performed by: NURSE PRACTITIONER

## 2021-04-13 PROCEDURE — 85598 HEXAGNAL PHOSPH PLTLT NEUTRL: CPT | Performed by: NURSE PRACTITIONER

## 2021-04-13 PROCEDURE — 82550 ASSAY OF CK (CPK): CPT | Performed by: NURSE PRACTITIONER

## 2021-04-13 PROCEDURE — 84550 ASSAY OF BLOOD/URIC ACID: CPT | Performed by: INTERNAL MEDICINE

## 2021-04-13 PROCEDURE — 25010000002 ZOLEDRONIC ACID PER 1 MG: Performed by: INTERNAL MEDICINE

## 2021-04-13 PROCEDURE — 96361 HYDRATE IV INFUSION ADD-ON: CPT

## 2021-04-13 PROCEDURE — 83655 ASSAY OF LEAD: CPT | Performed by: NURSE PRACTITIONER

## 2021-04-13 PROCEDURE — 85730 THROMBOPLASTIN TIME PARTIAL: CPT | Performed by: NURSE PRACTITIONER

## 2021-04-13 PROCEDURE — 85732 THROMBOPLASTIN TIME PARTIAL: CPT | Performed by: NURSE PRACTITIONER

## 2021-04-13 PROCEDURE — 86038 ANTINUCLEAR ANTIBODIES: CPT | Performed by: INTERNAL MEDICINE

## 2021-04-13 PROCEDURE — 0 GADOBENATE DIMEGLUMINE 529 MG/ML SOLUTION: Performed by: INTERNAL MEDICINE

## 2021-04-13 PROCEDURE — 72158 MRI LUMBAR SPINE W/O & W/DYE: CPT

## 2021-04-13 PROCEDURE — 86225 DNA ANTIBODY NATIVE: CPT | Performed by: INTERNAL MEDICINE

## 2021-04-13 PROCEDURE — 82595 ASSAY OF CRYOGLOBULIN: CPT | Performed by: NURSE PRACTITIONER

## 2021-04-13 PROCEDURE — 86200 CCP ANTIBODY: CPT | Performed by: INTERNAL MEDICINE

## 2021-04-13 RX ORDER — MAGNESIUM SULFATE HEPTAHYDRATE 40 MG/ML
2 INJECTION, SOLUTION INTRAVENOUS AS NEEDED
Status: DISCONTINUED | OUTPATIENT
Start: 2021-04-13 | End: 2021-04-15 | Stop reason: HOSPADM

## 2021-04-13 RX ORDER — MAGNESIUM SULFATE HEPTAHYDRATE 40 MG/ML
4 INJECTION, SOLUTION INTRAVENOUS AS NEEDED
Status: DISCONTINUED | OUTPATIENT
Start: 2021-04-13 | End: 2021-04-15 | Stop reason: HOSPADM

## 2021-04-13 RX ADMIN — SODIUM CHLORIDE, PRESERVATIVE FREE 10 ML: 5 INJECTION INTRAVENOUS at 20:22

## 2021-04-13 RX ADMIN — LACTULOSE 20 G: 20 SOLUTION ORAL at 09:02

## 2021-04-13 RX ADMIN — WARFARIN 2.5 MG: 2.5 TABLET ORAL at 20:22

## 2021-04-13 RX ADMIN — SODIUM CHLORIDE 75 ML/HR: 9 INJECTION, SOLUTION INTRAVENOUS at 04:24

## 2021-04-13 RX ADMIN — DOXYCYCLINE 100 MG: 100 CAPSULE ORAL at 20:22

## 2021-04-13 RX ADMIN — SODIUM CHLORIDE 75 ML/HR: 9 INJECTION, SOLUTION INTRAVENOUS at 22:26

## 2021-04-13 RX ADMIN — SODIUM CHLORIDE, PRESERVATIVE FREE 10 ML: 5 INJECTION INTRAVENOUS at 09:03

## 2021-04-13 RX ADMIN — Medication 1 EACH: at 09:02

## 2021-04-13 RX ADMIN — Medication 1000 MCG: at 09:01

## 2021-04-13 RX ADMIN — DOXYCYCLINE 100 MG: 100 CAPSULE ORAL at 09:01

## 2021-04-13 RX ADMIN — MESALAMINE 1.5 G: 0.38 CAPSULE, EXTENDED RELEASE ORAL at 09:01

## 2021-04-13 RX ADMIN — PANTOPRAZOLE SODIUM 40 MG: 40 TABLET, DELAYED RELEASE ORAL at 06:07

## 2021-04-13 RX ADMIN — GADOBENATE DIMEGLUMINE 20 ML: 529 INJECTION, SOLUTION INTRAVENOUS at 16:04

## 2021-04-13 RX ADMIN — ZOLEDRONIC ACID 3.3 MG: 4 INJECTION, SOLUTION, CONCENTRATE INTRAVENOUS at 17:00

## 2021-04-13 NOTE — PLAN OF CARE
"This is the patient's 3rd refusal of PT services. Had long discussion with patient and her goals. Patient stated she is \"just too weak to get up.\" Asked patient what she plans on doing when she goes home and says she'll \"wait to get up with physical therapy when they come to the house.\" Asked patient why she would get up with physical therapy in her home but not here in the hospital and she says she \"just can't do it, she doesn't want to.\"  Patient states she will take an ambulance home, stay in her hospital bed and that her daughters and son will help change her and roll her in bed when she is at home. Attempted to encourage and educate patient to participate in bed exercise or mobility even rolling side<>side to be able to do that on her own and she adamantly refused despite there being no medical or musculoskeletal reason as to why she could not perform bed mobility.  Asked patient if she should be discharged from PT caseload since she is refusing to participate and she said \"Yes take me off. I'm not going to work with you.\"      Patient will be DC from PT caseload. Instructed patient and RN that because the last documented standing/bed mobility required modA x 2 that current DC recs are SNU/DANN. However since patient and family are refusing, safe DC home includes ambulance transportation home into hospital bed. Pt requires 2 people for bed mobility, toileting/dressing in bed. Optional HHPT to follow only if patient will participate.  Problem: Adult Inpatient Plan of Care  Goal: Plan of Care Review  Outcome: Unable to Meet, Plan Revised  Flowsheets (Taken 4/12/2021 0566 by Areli Dick, RN)  Plan of Care Reviewed With: patient   Goal Outcome Evaluation:           "

## 2021-04-13 NOTE — PROGRESS NOTES
Name: Brittany Villeda ADMIT: 2021   : 1935  PCP: Mega Esparza MD    MRN: [id number] LOS: 0 days   AGE/SEX: 86 y.o. female  ROOM: [facility]     Subjective   Subjective   No change of weakness of both thigh muscles/upper extremity bilaterally.  Positive myalgia of the extremities...  No double vision.  No loss of the vision.  No slurring of the speech.  No unilateral numbness or weakness.  No loss of consciousness.  No dizziness.  No joint pain or swelling.  Complaining of right wrist pain.  Nursing staff reports that the patient is not motivated at all and refuses to work with physical therapy.      Review of Systems    Cardiovascular/respiratory.  No chest pain.  No shortness of breath.  No palpitation.  GI.  No abdominal pain or nausea or vomiting.  No bowel movement for a few days.  .  No dysuria or hematuria.  No incontinence.     Objective   Objective   Vital Signs  Temp:  [97.9 °F (36.6 °C)-98.3 °F (36.8 °C)] 98.3 °F (36.8 °C)  Heart Rate:  [69-82] 70  Resp:  [16-18] 18  BP: (105-133)/(51-70) 128/62  SpO2:  [98 %-99 %] 99 %  on   ;   Device (Oxygen Therapy): room air    Intake/Output Summary (Last 24 hours) at 2021 1207  Last data filed at 2021 0941  Gross per 24 hour   Intake 3284 ml   Output --   Net 3284 ml     Body mass index is 36.9 kg/m².      21  0145 21  1106   Weight: 97.5 kg (215 lb) 97.5 kg (215 lb)     Physical Exam    General.  Elderly female.  Alert oriented x3 no apparent pain distress or diaphoresis normal mood and affect  Eyes.  Pupils equal round and reactive intact extraocular musculature no pallor or jaundice  Neck.  Supple no JVD no lymphadenopathy no thyromegaly  Cardiovascular.  Regular rate and rhythm with grade 2 systolic murmur.  Chest.  Clear to auscultation bilaterally with no added sounds  Abdomen.  Soft lax no tenderness no organomegaly no guarding or  Extremities.  No clubbing cyanosis or edema  CNS.  Proximal muscle weakness of both  lower extremities and both upper extremities more so on the lower extremities than the upper(no change from yesterday) otherwise negative  Musculoskeletal.  Redness/hotness/tenderness of the right wrist with decreased range of movement otherwise negative    Results Review:      Results from last 7 days   Lab Units 04/12/21  1031 04/12/21  0645 04/11/21  0431 04/10/21  0642 04/09/21  0725 04/08/21  0548 04/07/21  2335   SODIUM mmol/L  --  137 138 139 138 140 139   POTASSIUM mmol/L  --  4.2 4.1 4.3 4.3 4.7 4.4   CHLORIDE mmol/L  --  105 102 106 105 112* 106   CO2 mmol/L  --  23.8 24.3 26.2 26.8 21.0* 24.6   BUN mg/dL  --  12 11 11 10 9 12   CREATININE mg/dL  --  0.55* 0.55* 0.63 0.65 0.52* 0.68   GLUCOSE mg/dL  --  95 86 82 85 76 108*   CALCIUM mg/dL 10.0 10.0 10.7* 10.4 10.2 8.4* 9.7   AST (SGOT) U/L  --  17 22 16  --   --  16   ALT (SGPT) U/L  --  11 8 8  --   --  9     Estimated Creatinine Clearance: 57.2 mL/min (A) (by C-G formula based on SCr of 0.55 mg/dL (L)).  Results from last 7 days   Lab Units 04/13/21  0552   HEMOGLOBIN A1C % 4.60*         Results from last 7 days   Lab Units 04/13/21  0552 04/11/21  1247 04/10/21  0642 04/07/21  2335   CK TOTAL U/L 194* 211* 194*  --    TROPONIN T ng/mL  --   --   --  <0.010     Results from last 7 days   Lab Units 04/07/21  2335   PROBNP pg/mL 1,228.0     Results from last 7 days   Lab Units 04/11/21  1247 04/09/21  0725   TSH uIU/mL 0.544 0.509     Results from last 7 days   Lab Units 04/12/21  1031 04/11/21  1247   MAGNESIUM mg/dL 1.5* 1.6           Invalid input(s): LDLCALC  Results from last 7 days   Lab Units 04/12/21  0645 04/11/21  0431 04/10/21  0642 04/09/21  0725 04/08/21  0912 04/07/21  2335 04/07/21  2335   WBC 10*3/mm3 4.40 4.85 3.73 3.30* 3.01*  --  3.64   HEMOGLOBIN g/dL 12.9 13.2 12.5 12.9 13.2  --  12.2   HEMATOCRIT % 36.8 41.1 38.1 38.3 38.5  --  37.7   PLATELETS 10*3/mm3 165 146 144 152 142  --  163   MCV fL 95.8 103.0* 98.4* 97.5* 97.5*   < > 99.0*      MCH pg 33.6* 33.1* 32.3 32.8 33.4*   < > 32.0   MCHC g/dL 35.1 32.1 32.8 33.7 34.3   < > 32.4   RDW % 12.5 13.0 12.7 12.8 12.7   < > 13.1   RDW-SD fl 43.1 50.0 45.2 45.4 44.9   < > 46.8   MPV fL 9.5 9.5 9.5 9.3 9.1   < > 9.3   NEUTROPHIL % %  --  47.4  --  39.7*  --    < >  --    LYMPHOCYTE % %  --  35.5  --  45.5*  --    < >  --    MONOCYTES % %  --  14.0*  --  10.6  --    < >  --    EOSINOPHIL % %  --  2.5  --  3.0  --    < >  --    BASOPHIL % %  --  0.4  --  0.9  --    < >  --    IMM GRAN % %  --  0.2  --   --   --   --   --    NEUTROS ABS 10*3/mm3  --  2.30  --  1.31*  --    < > 1.26*   LYMPHS ABS 10*3/mm3  --  1.72  --  1.50  --    < >  --    MONOS ABS 10*3/mm3  --  0.68  --  0.35  --    < >  --    EOS ABS 10*3/mm3  --  0.12  --  0.10  --    < > 0.04   BASOS ABS 10*3/mm3  --  0.02  --  0.03  --    < > 0.04   IMMATURE GRANS (ABS) 10*3/mm3  --  0.01  --   --   --   --   --    NRBC /100 WBC  --  0.0  --   --   --   --  0.0    < > = values in this interval not displayed.     Results from last 7 days   Lab Units 04/13/21  0553 04/12/21  0645 04/11/21  0431 04/10/21  0642 04/09/21  0725 04/07/21  2335   INR  2.17* 1.97* 1.99* 2.25* 2.21* 2.15*         Results from last 7 days   Lab Units 04/08/21  0548   PROCALCITONIN ng/mL 0.05     Results from last 7 days   Lab Units 04/13/21  0552 04/12/21  1031   SED RATE mm/hr 41* 43*   CRP mg/dL 8.20*  --          Results from last 7 days   Lab Units 04/08/21  0107   URINECX  >100,000 CFU/mL Escherichia coli*         Results from last 7 days   Lab Units 04/08/21  0107   NITRITE UA  Positive*   WBC UA /HPF 31-50*   BACTERIA UA /HPF 4+*   SQUAM EPITHEL UA /HPF 0-2   URINECX  >100,000 CFU/mL Escherichia coli*           Imaging:  Imaging Results (Last 24 Hours)     Procedure Component Value Units Date/Time    CT Abdomen Pelvis Without Contrast [[id number]] Collected: 04/12/21 1642     Updated: 04/13/21 1045    Narrative:      CT OF THE CHEST, ABDOMEN AND PELVIS WITHOUT CONTRAST  04/12/2021     HISTORY: Hypercalcemia. Evaluate for neoplasm.     TECHNIQUE: Spiral images were obtained from the lung apices to the  symphysis pubis. No intravenous or oral contrast was given.     COMPARISON: Previous CT of the chest dated 08/26/2020 and CT of the  abdomen and pelvis dated 11/20/2019 are compared.     FINDINGS: Again seen is rather marked enlargement of the thyroid gland  which appears similar to the 08/26/2020 study. Small amount of  mediastinal lymphadenopathy is seen and this also appears stable from  the previous study. There is some aortic and coronary calcification.     There is some minimal ground-glass appearing infiltrate in the lingula  on images 50 through 57. There is some mild chronic atelectasis or scar  of the right lower lobe. This appears similar to the 08/26/2020 study.     No new lung masses are seen.     The gallbladder has been removed. Liver appears somewhat small but no  focal hepatic lesions are seen. The spleen, pancreas and adrenals appear  unremarkable. Low-density bilateral renal lesions are seen presumably  cysts on this unenhanced scan of these were also present on the previous  study of 08/26/2020. Lower lumbar fusion hardware is seen.     There is colonic diverticulosis. Moderately large amount of stool is  seen in the rectum which is distended up to approximately 7.4 cm. Uterus  has been removed.       Impression:      1. Markedly enlarged thyroid gland which was also seen on the previous  study of 08/26/2020.  2. No definite evidence of neoplasm in the chest.  3. Status post hysterectomy and cholecystectomy.  4. Bilateral renal cysts.  5. No evidence of neoplasm in the abdomen and pelvis.  6. There is some minimal ground glass appearing infiltrate in the left  upper lobe which may be inflammatory in nature. This was not seen on the  08/26/2020 study. Mild scar or chronic atelectasis of the right lower  lobe is seen.                 Radiation dose reduction  techniques were utilized, including automated  exposure control and exposure modulation based on body size.     This report was finalized on 4/13/2021 10:42 AM by Dr. Gordy Nance M.D.       CT Chest Without Contrast Diagnostic [[id number]] Collected: 04/12/21 1642     Updated: 04/13/21 1045    Narrative:      CT OF THE CHEST, ABDOMEN AND PELVIS WITHOUT CONTRAST 04/12/2021     HISTORY: Hypercalcemia. Evaluate for neoplasm.     TECHNIQUE: Spiral images were obtained from the lung apices to the  symphysis pubis. No intravenous or oral contrast was given.     COMPARISON: Previous CT of the chest dated 08/26/2020 and CT of the  abdomen and pelvis dated 11/20/2019 are compared.     FINDINGS: Again seen is rather marked enlargement of the thyroid gland  which appears similar to the 08/26/2020 study. Small amount of  mediastinal lymphadenopathy is seen and this also appears stable from  the previous study. There is some aortic and coronary calcification.     There is some minimal ground-glass appearing infiltrate in the lingula  on images 50 through 57. There is some mild chronic atelectasis or scar  of the right lower lobe. This appears similar to the 08/26/2020 study.     No new lung masses are seen.     The gallbladder has been removed. Liver appears somewhat small but no  focal hepatic lesions are seen. The spleen, pancreas and adrenals appear  unremarkable. Low-density bilateral renal lesions are seen presumably  cysts on this unenhanced scan of these were also present on the previous  study of 08/26/2020. Lower lumbar fusion hardware is seen.     There is colonic diverticulosis. Moderately large amount of stool is  seen in the rectum which is distended up to approximately 7.4 cm. Uterus  has been removed.       Impression:      1. Markedly enlarged thyroid gland which was also seen on the previous  study of 08/26/2020.  2. No definite evidence of neoplasm in the chest.  3. Status post hysterectomy and  cholecystectomy.  4. Bilateral renal cysts.  5. No evidence of neoplasm in the abdomen and pelvis.  6. There is some minimal ground glass appearing infiltrate in the left  upper lobe which may be inflammatory in nature. This was not seen on the  08/26/2020 study. Mild scar or chronic atelectasis of the right lower  lobe is seen.                 Radiation dose reduction techniques were utilized, including automated  exposure control and exposure modulation based on body size.     This report was finalized on 4/13/2021 10:42 AM by Dr. Gordy Nance M.D.                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]  No   • Hypomagnesemia [E83.42]  No   • Macrocytosis [D75.89]  Yes   • Hypercalcemia [E83.52]  No   • Dysautonomia (CMS/HCC) [G90.1]  Yes   • Weakness of both lower extremities [R29.898]  Yes   • Acute UTI (urinary tract infection) [N39.0]  Yes   • Chronic anticoagulation [Z79.01]  Not Applicable   • Immobility [Z74.09]  Yes   • Obesity (BMI 30-39.9) [E66.9]  Yes   • Atrial fibrillation, persistent (CMS/HCC) [I48.19]  Yes   • 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  Differential diagnosis myopathy/peripheral neuropathy/cord abnormality/CNS demyelinating disease..  CK only mildly elevated arguing against myopathy...  Potassium is normal.  TSH is normal.  Low  magnesium, will substitute..  Not on steroids or other medications that can cause myopathy.. PT and OT working with the patient but she is unmotivated.  Neurology now checking C, T, L-spine MRI.  I definitely agree that an EMG study is appropriate.  She might require a muscle/nerve biopsy  · E. coli UTI sensitive to doxycycline we will switch from Rocephin to doxycycline patient is asymptomatic.  · History of atrial fibrillation/diastolic congestive heart failure/hypertension tension/pulmonary hypertension.  BNP is normal.  Rate is controlled.  Blood pressure is controlled.  Currently on Coumadin with therapeutic INR.  Will monitor.  No signs of angina or congestive heart failure.  Echo with normal ejection fraction right atrial dilatation left atrial dilation and diastolic dysfunction.  Cardiology okayed MRI.  · History of ulcerative colitis.  Continue Protonix and mesalamine.  Stable with negative GI examination.  · History of obstructive sleep apnea.  Nocturnal oxygen.  · Hyperbilirubinemia.  Intermittent problem.  Suspect Gilbert's disease. Benign GI examination.  Will monitor.  · Hypercalcemia.  Elevated ionized calcium.  Improving.  Cont. Slow IV fluid.  . calcium and vitamin D DC'd.  Parathyroid hormone is elevated.  Vitamin D level is normal.  ESR and C-reactive protein are elevated.  Awaiting calcitonin/serum protein immunofixation.  CT of the chest, abdomen, pelvis with no evidence of tumors.  This is mostly hyperparathyroidism.  Will give extra bolus of Zometa.  Hypercalcemia slowly improving.  Will monitor..  · Hypomagnesemia.  Will replace and monitor.  · Macrocytosis.  Normal B12/folate/cortisol and TSH.   · Constipation.  C resolved.  Ontinue Colace and add lactulose  · Right wrist inflammatory arthritis.  Will check uric acid/CCP/ALEC/x-ray  · Discussed with patient/nurse.      Cedric Cee MD  [facility]  04/13/21  12:07 EDT

## 2021-04-13 NOTE — PROGRESS NOTES
Pharmacy Consult: Warfarin Dosing/ Monitoring    Brittany Villeda is a 86 y.o. female, estimated creatinine clearance is 57.2 mL/min (A) (by C-G formula based on SCr of 0.55 mg/dL (L)). weighing 97.5 kg (215 lb).    PMH: Acute UTI (2021), Anemia, Arrhythmia, Arthritis, Asthma, Bradycardia, Chest pain, Colitis, COPD, Diastolic dysfunction, Essential hypertension (2016), GERD, Heart block, Hypertension, Hypertensive heart disease, Hyperthyroidism, Kidney stone, Leukopenia, Low back pain, MGUS (monoclonal gammopathy of unknown significance), Nephrolithiasis, Obesity, Paroxysmal atrial fibrillation, Peptic ulceration, PSVT (paroxysmal supraventricular tachycardia), Pulmonary hypertension, Rectal bleed, Sleep apnea, Trifascicular block, Ulcerative rectosigmoiditis without complication, Ventricular tachycardia, and Vertigo.    Social History     Tobacco Use    Smoking status: Former Smoker     Packs/day: 1.50     Years: 10.00     Pack years: 15.00     Start date:      Quit date:      Years since quittin.3    Smokeless tobacco: Never Used    Tobacco comment: QUIT SMOKING    Substance Use Topics    Alcohol use: No     Comment: Daily caffeine use - one cup of coffee    Drug use: Defer     Results from last 7 days   Lab Units 21  0553 21  0645 21  0431 04/10/21  0642 21  0725 21  0912 21  2335   INR  2.17* 1.97* 1.99* 2.25* 2.21*  --  2.15*   HEMOGLOBIN g/dL  --  12.9 13.2 12.5 12.9 13.2 12.2   HEMATOCRIT %  --  36.8 41.1 38.1 38.3 38.5 37.7   PLATELETS 10*3/mm3  --  165 146 144 152 142 163     Results from last 7 days   Lab Units 21  1031 21  0645 21  0431 04/10/21  0642   SODIUM mmol/L  --  137 138 139   POTASSIUM mmol/L  --  4.2 4.1 4.3   CHLORIDE mmol/L  --  105 102 106   CO2 mmol/L  --  23.8 24.3 26.2   BUN mg/dL  --  12 11 11   CREATININE mg/dL  --  0.55* 0.55* 0.63   CALCIUM mg/dL 10.0 10.0 10.7* 10.4   BILIRUBIN mg/dL  --  1.9* 1.9*  1.7*   ALK PHOS U/L  --  80 77 67   ALT (SGPT) U/L  --  11 8 8   AST (SGOT) U/L  --  17 22 16   GLUCOSE mg/dL  --  95 86 82     Anticoagulation history: Warfarin therapy is managed outpatient by the [facility]. Patient takes Warfarin 5 mg po q Mon,Thu,Sat and 2.5 mg all other days    Hospital Anticoagulation:  Consulting provider: SHAHID Randall  Start date: 4/8/21 continued from home  Indication: Atrial fibrillation  Target INR: 2.0-3.0  Expected duration: Indefinite   Bridge Therapy: No                  Date 4/8 4/9  4/10  4/11 4/12 4/13       INR 2.15 2.21  2.25 1.99 1.97 2.17       Warfarin dose 5 mg 2.5 mg  5 mg  2.5 mg 5 mg 2.5 mg         Potential drug interactions:  1) Ceftriaxone - may enhance the effect of warfarin  2) Acetaminophen - may result in an increased risk of bleeding.   3) Doxycycline - may result in an increased risk of bleeding.   4) Mesalamine - may result in decreased warfarin efficacy.   5) Pantoprazole - may result in increased International Normalized Ratio (INR) and prothrombin time.     Relevant nutrition status: Regular, cardiac diet    Other: Albumin: 3.50 g/dL (4/12/21)    Education complete?/ Date: [facility]    Assessment/Plan:    1) Continue outpatient warfarin regimen: Warfarin 5 mg po q Mon,Thu,Sat and 2.5 mg all other days  2) Monitor for signs/symptoms of bleeding  3) Follow up INR tomorrow    Pharmacy will continue to follow until discharge or discontinuation of warfarin.     Moraima Cabrales [facility]  Clinical Staff Pharmacist

## 2021-04-13 NOTE — PLAN OF CARE
"Goal Outcome Evaluation:   Pt had MRI spine series today.  Several lab tests ordered.  Pt refused PT and stated that she will \"go home by ambulance, be put in her hospital bed at home, and her family can turn her\".  PT removed her from their caseload.  Pt has had a flat affect this shift.  Minimal complaints of pain but declined pain medication.  VSS.  Pts needs met this shift.  Will continue to monitor        "

## 2021-04-13 NOTE — PROGRESS NOTES
"DOS: 2021  NAME: Brittany Villeda   : 1935  PCP: Mega Esparza MD  Chief Complaint   Patient presents with   • Shortness of Breath     Neurology    Subjective: Patient denies any significant events overnight.  Weakness is stable.  She does report right hand discomfort in addition to right elbow pain noticed yesterday.  Denies injury.  Per PT notes patient has refused to work with PT.    Objective:  Vital signs: /62 (BP Location: Right arm, Patient Position: Lying)   Pulse 70   Temp 98.3 °F (36.8 °C) (Oral)   Resp 18   Ht 162.6 cm (64\")   Wt 97.5 kg (215 lb)   LMP  (LMP Unknown)   SpO2 99%   BMI 36.90 kg/m²       General appearance: Well developed, well nourished, well groomed, alert and cooperative.  Obese.    HEENT: Normocephalic.   Neck and spine: Neck supple, no nuchal rigidity.  Cardiac: Regular rate and rhythm.  Peripheral Vasculature: Radial pulses are equal and symmetric.  Chest Exam: Clear to auscultation bilaterally, no wheezes, no rhonchi.  Extremities: No edema.   Skin: No rashes or birthmarks.      Higher integrative function: Oriented to time, place, person, intact recent and remote memory, attention span, concentration and language. Spontaneous speech, fund of vocabulary are normal.   CN II: Normal visual fields.   CN III IV VI: Extraocular movements are full without nystagmus. Pupils are equal, round, and reactive to light.   CN V: Normal facial sensation.  CN VII: Facial movements are symmetric, no weakness.   CN VIII: Auditory acuity is normal.   CN IX & X: Symmetric palatal movement.   CN XI: Sternocleidomastoid and trapezius are normal. No weakness.   CN XII: The tongue is midline. No atrophy or fasciculations.   Motor: Bilateral upper extremity with no resistance in deltoids.  RUE power testing limited by pain, but elbow extension/flexion at least 4/5. LUE 4/5. Lower extremities are approximately 4- out of 5 throughout but patient unable to raise lower extremities " against gravity.  No fasciculations, rigidity, spasticity or abnormal movements.   Sensation: Intact to light touch in all extremities.  Station and gait: Patient nonambulatory.  Muscle stretch reflexes: Plantar reflexes are flexor bilaterally.   Coordination: Difficulty completing finger-to-nose due to proximal weakness.  Patient reexamined, changes noted.      Scheduled Meds:docusate sodium, 100 mg, Oral, Nightly  doxycycline, 100 mg, Oral, Q12H  lactulose, 20 g, Oral, BID  mesalamine, 1.5 g, Oral, Daily  pantoprazole, 40 mg, Oral, QAM  sodium chloride, 10 mL, Intravenous, Q12H  vitamin B-12, 1,000 mcg, Oral, Daily  warfarin, 2.5 mg, Oral, Once per day on Sun Tue Wed Fri  warfarin, 5 mg, Oral, Once per day on Mon Thu Sat  wheat dextrin, 1 each, Oral, Daily  zoledronic acid, 3.3 mg, Intravenous, Once      Continuous Infusions:Pharmacy Consult - Pharmacy to dose,   Pharmacy to dose warfarin,   sodium chloride, 75 mL/hr, Last Rate: 75 mL/hr (04/13/21 0424)      PRN Meds:.•  acetaminophen **OR** acetaminophen **OR** acetaminophen  •  albuterol sulfate HFA  •  calcium carbonate  •  magnesium sulfate **OR** magnesium sulfate **OR** magnesium sulfate  •  ondansetron **OR** ondansetron  •  Pharmacy Consult - Pharmacy to dose  •  Pharmacy to dose warfarin  •  sodium chloride    Laboratory results:  Lab Results   Component Value Date    GLUCOSE 95 04/12/2021    CALCIUM 10.0 04/12/2021     04/12/2021    K 4.2 04/12/2021    CO2 23.8 04/12/2021     04/12/2021    BUN 12 04/12/2021    CREATININE 0.55 (L) 04/12/2021    EGFRIFAFRI 127 04/12/2021    EGFRIFNONA 48 (L) 06/19/2020    BCR 21.8 04/12/2021    ANIONGAP 8.2 04/12/2021     Lab Results   Component Value Date    WBC 4.40 04/12/2021    HGB 12.9 04/12/2021    HCT 36.8 04/12/2021    MCV 95.8 04/12/2021     04/12/2021     Lab Results   Component Value Date    CHOL 141 12/25/2017     Lab Results   Component Value Date    HDL 64 (H) 12/25/2017     Lab Results    Component Value Date    LDL 66 12/25/2017     Lab Results   Component Value Date    TRIG 55 12/25/2017         Lab 04/13/21  0552   HEMOGLOBIN A1C 4.60*      Review and interpretation of imaging:    Impression:  This patient is an 86-year-old female, former smoker, with HTN, hypothyroidism, PAF on warfarin, s/p PPM, HUGO, COPD, ulcerative colitis, previous kidney stone, and MGUS who presented 4/7 with shortness of breath and weakness.  The patient tells me she was admitted in November 2020 with difficulty walking.  Per quick review of chart at that time patient was found to have hemarthrosis and an ankle injury.  She states she was bedridden for some time after that discharge.  She has recently been working with physical therapy and is now able to ambulate from her bedroom to the living room with assist x2 and a walker.  Starting Saturday 4/3 she has had worsening weakness in bilateral lower extremities, shortness of air, and lightheadedness prompting presentation to the ED.  She does note some low back pain as well as left groin discomfort with pains radiating into the left lower extremity.  She describes some nonpainful right lower extremity jerking/spasm.  INR was 2.15 on admission, UA positive (started on rocephin), chest x-ray and CT head were negative for acute findings.  She was initially evaluated by Dr. Daniels who has ordered an MRI of the lumbar spine.  She also notes upper extremity weakness and difficulty feeding herself.  She is currently being worked up for hypercalcemia.     Work-up:  CT head: No acute findings.  Mild atrophy and small vessel disease noted.  2D echo: EF 60%, severely dilated left atrial cavity size, no spontaneous echo contrast, saline test results negative.  CT abdomen/pelvis: No evidence of neoplasm.  Markedly enlarged thyroid, also noted on previous study from August 2020.  Labs: Folate 7.8, PTH 67.9, TSH 0.54, B12 1355,  /211,  on 4/13.  Covid testing negative this  admission.  Hemoglobin A1c 4.60%, CRP 8.20, sedimentation rate 41        Diagnoses:  Proximal muscle weakness-subacute.  Etiology not clear. CKs are only mildly elevated arguing against myopathy.  May be a combination of deconditioning plus or minus myelopathy.  E. coli UTI  Hypercalcemia  History of A. fib on warfarin  History of HUGO  History of ulcerative colitis  The above impression statement reviewed and changes noted.    Plan:  Follow-up additional labs for treatable causes of peripheral neuropathy as well as lupus and ALEC panels  Follow-up MRI C/T/L-spine, further recommendations pending these results.  She will likely need outpatient EMG/NCS and follow-up with Dr. Maxime Arnold.  Discussed with Dr. Cherry today.  Will follow.

## 2021-04-14 ENCOUNTER — APPOINTMENT (OUTPATIENT)
Dept: GENERAL RADIOLOGY | Facility: HOSPITAL | Age: 86
End: 2021-04-14

## 2021-04-14 LAB
ALBUMIN SERPL ELPH-MCNC: 2.9 G/DL (ref 2.9–4.4)
ALBUMIN SERPL-MCNC: 2.9 G/DL (ref 3.5–5.2)
ALBUMIN/GLOB SERPL: 0.7 G/DL
ALBUMIN/GLOB SERPL: 0.9 {RATIO} (ref 0.7–1.7)
ALP SERPL-CCNC: 81 U/L (ref 39–117)
ALPHA1 GLOB SERPL ELPH-MCNC: 0.3 G/DL (ref 0–0.4)
ALPHA2 GLOB SERPL ELPH-MCNC: 0.6 G/DL (ref 0.4–1)
ALT SERPL W P-5'-P-CCNC: 15 U/L (ref 1–33)
ANA SER QL: POSITIVE
ANION GAP SERPL CALCULATED.3IONS-SCNC: 8 MMOL/L (ref 5–15)
AST SERPL-CCNC: 24 U/L (ref 1–32)
B-GLOBULIN SERPL ELPH-MCNC: 1.1 G/DL (ref 0.7–1.3)
BASOPHILS # BLD AUTO: 0.03 10*3/MM3 (ref 0–0.2)
BASOPHILS NFR BLD AUTO: 0.6 % (ref 0–1.5)
BILIRUB SERPL-MCNC: 2.1 MG/DL (ref 0–1.2)
BUN SERPL-MCNC: 7 MG/DL (ref 8–23)
BUN/CREAT SERPL: 14.6 (ref 7–25)
CA-I BLD-MCNC: 5.5 MG/DL (ref 4.6–5.4)
CA-I SERPL ISE-MCNC: 1.37 MMOL/L (ref 1.15–1.35)
CALCIT SERPL-MCNC: <2 PG/ML (ref 0–5)
CALCIUM SPEC-SCNC: 9.3 MG/DL (ref 8.6–10.5)
CCP IGA+IGG SERPL IA-ACNC: 11 UNITS (ref 0–19)
CENTROMERE B AB SER-ACNC: <0.2 AI (ref 0–0.9)
CHLORIDE SERPL-SCNC: 105 MMOL/L (ref 98–107)
CHROMATIN AB SERPL-ACNC: 0.5 AI (ref 0–0.9)
CO2 SERPL-SCNC: 22 MMOL/L (ref 22–29)
CREAT SERPL-MCNC: 0.48 MG/DL (ref 0.57–1)
DEPRECATED RDW RBC AUTO: 45.5 FL (ref 37–54)
DSDNA AB SER-ACNC: <1 IU/ML (ref 0–9)
ENA JO1 AB SER-ACNC: <0.2 AI (ref 0–0.9)
ENA RNP AB SER-ACNC: 2.2 AI (ref 0–0.9)
ENA SCL70 AB SER-ACNC: <0.2 AI (ref 0–0.9)
ENA SM AB SER-ACNC: <0.2 AI (ref 0–0.9)
ENA SS-A AB SER-ACNC: <0.2 AI (ref 0–0.9)
ENA SS-B AB SER-ACNC: <0.2 AI (ref 0–0.9)
EOSINOPHIL # BLD AUTO: 0.07 10*3/MM3 (ref 0–0.4)
EOSINOPHIL NFR BLD AUTO: 1.5 % (ref 0.3–6.2)
ERYTHROCYTE [DISTWIDTH] IN BLOOD BY AUTOMATED COUNT: 12.5 % (ref 12.3–15.4)
GAMMA GLOB SERPL ELPH-MCNC: 1.6 G/DL (ref 0.4–1.8)
GFR SERPL CREATININE-BSD FRML MDRD: 149 ML/MIN/1.73
GLOBULIN SER-MCNC: 3.6 G/DL (ref 2.2–3.9)
GLOBULIN UR ELPH-MCNC: 3.9 GM/DL
GLUCOSE SERPL-MCNC: 90 MG/DL (ref 65–99)
HCT VFR BLD AUTO: 35.8 % (ref 34–46.6)
HGB BLD-MCNC: 12 G/DL (ref 12–15.9)
IGA SERPL-MCNC: 783 MG/DL (ref 64–422)
IGA SERPL-MCNC: 792 MG/DL (ref 64–422)
IGG SERPL-MCNC: 1538 MG/DL (ref 586–1602)
IGG SERPL-MCNC: 1725 MG/DL (ref 586–1602)
IGM SERPL-MCNC: 56 MG/DL (ref 26–217)
IGM SERPL-MCNC: 58 MG/DL (ref 26–217)
IMM GRANULOCYTES # BLD AUTO: 0.01 10*3/MM3 (ref 0–0.05)
IMM GRANULOCYTES NFR BLD AUTO: 0.2 % (ref 0–0.5)
INR PPP: 1.9 (ref 0.9–1.1)
INTERPRETATION SERPL IEP-IMP: ABNORMAL
LABORATORY COMMENT REPORT: ABNORMAL
LYMPHOCYTES # BLD AUTO: 1.47 10*3/MM3 (ref 0.7–3.1)
LYMPHOCYTES NFR BLD AUTO: 31.4 % (ref 19.6–45.3)
Lab: ABNORMAL
M PROTEIN SERPL ELPH-MCNC: 0.3 G/DL
MAGNESIUM SERPL-MCNC: 1.5 MG/DL (ref 1.6–2.4)
MCH RBC QN AUTO: 33.1 PG (ref 26.6–33)
MCHC RBC AUTO-ENTMCNC: 33.5 G/DL (ref 31.5–35.7)
MCV RBC AUTO: 98.9 FL (ref 79–97)
MONOCYTES # BLD AUTO: 0.63 10*3/MM3 (ref 0.1–0.9)
MONOCYTES NFR BLD AUTO: 13.5 % (ref 5–12)
NEUTROPHILS NFR BLD AUTO: 2.47 10*3/MM3 (ref 1.7–7)
NEUTROPHILS NFR BLD AUTO: 52.8 % (ref 42.7–76)
NRBC BLD AUTO-RTO: 0 /100 WBC (ref 0–0.2)
PLATELET # BLD AUTO: 170 10*3/MM3 (ref 140–450)
PMV BLD AUTO: 9.2 FL (ref 6–12)
POTASSIUM SERPL-SCNC: 3.7 MMOL/L (ref 3.5–5.2)
PROT PATTERN SERPL IFE-IMP: ABNORMAL
PROT SERPL-MCNC: 6.5 G/DL (ref 6–8.5)
PROT SERPL-MCNC: 6.8 G/DL (ref 6–8.5)
PROTHROMBIN TIME: 21.6 SECONDS (ref 11.7–14.2)
RBC # BLD AUTO: 3.62 10*6/MM3 (ref 3.77–5.28)
SODIUM SERPL-SCNC: 135 MMOL/L (ref 136–145)
WBC # BLD AUTO: 4.68 10*3/MM3 (ref 3.4–10.8)

## 2021-04-14 PROCEDURE — 85025 COMPLETE CBC W/AUTO DIFF WBC: CPT | Performed by: INTERNAL MEDICINE

## 2021-04-14 PROCEDURE — 73030 X-RAY EXAM OF SHOULDER: CPT

## 2021-04-14 PROCEDURE — 86235 NUCLEAR ANTIGEN ANTIBODY: CPT | Performed by: PSYCHIATRY & NEUROLOGY

## 2021-04-14 PROCEDURE — 36415 COLL VENOUS BLD VENIPUNCTURE: CPT | Performed by: INTERNAL MEDICINE

## 2021-04-14 PROCEDURE — 96361 HYDRATE IV INFUSION ADD-ON: CPT

## 2021-04-14 PROCEDURE — 80053 COMPREHEN METABOLIC PANEL: CPT | Performed by: INTERNAL MEDICINE

## 2021-04-14 PROCEDURE — 73521 X-RAY EXAM HIPS BI 2 VIEWS: CPT

## 2021-04-14 PROCEDURE — 25010000002 METHYLPREDNISOLONE PER 40 MG: Performed by: INTERNAL MEDICINE

## 2021-04-14 PROCEDURE — 85610 PROTHROMBIN TIME: CPT | Performed by: INTERNAL MEDICINE

## 2021-04-14 PROCEDURE — G0378 HOSPITAL OBSERVATION PER HR: HCPCS

## 2021-04-14 PROCEDURE — 83735 ASSAY OF MAGNESIUM: CPT | Performed by: INTERNAL MEDICINE

## 2021-04-14 PROCEDURE — 96375 TX/PRO/DX INJ NEW DRUG ADDON: CPT

## 2021-04-14 PROCEDURE — 25010000002 MAGNESIUM SULFATE 2 GM/50ML SOLUTION: Performed by: INTERNAL MEDICINE

## 2021-04-14 PROCEDURE — 82330 ASSAY OF CALCIUM: CPT | Performed by: INTERNAL MEDICINE

## 2021-04-14 PROCEDURE — 96376 TX/PRO/DX INJ SAME DRUG ADON: CPT

## 2021-04-14 PROCEDURE — 99213 OFFICE O/P EST LOW 20 MIN: CPT | Performed by: NURSE PRACTITIONER

## 2021-04-14 PROCEDURE — 86225 DNA ANTIBODY NATIVE: CPT | Performed by: PSYCHIATRY & NEUROLOGY

## 2021-04-14 RX ORDER — PANTOPRAZOLE SODIUM 40 MG/1
40 TABLET, DELAYED RELEASE ORAL
Status: DISCONTINUED | OUTPATIENT
Start: 2021-04-15 | End: 2021-04-15 | Stop reason: HOSPADM

## 2021-04-14 RX ORDER — METHYLPREDNISOLONE SODIUM SUCCINATE 40 MG/ML
20 INJECTION, POWDER, LYOPHILIZED, FOR SOLUTION INTRAMUSCULAR; INTRAVENOUS EVERY 12 HOURS
Status: DISCONTINUED | OUTPATIENT
Start: 2021-04-14 | End: 2021-04-14

## 2021-04-14 RX ORDER — METHYLPREDNISOLONE SODIUM SUCCINATE 40 MG/ML
20 INJECTION, POWDER, LYOPHILIZED, FOR SOLUTION INTRAMUSCULAR; INTRAVENOUS EVERY 12 HOURS
Status: DISCONTINUED | OUTPATIENT
Start: 2021-04-14 | End: 2021-04-15 | Stop reason: HOSPADM

## 2021-04-14 RX ADMIN — ACETAMINOPHEN 650 MG: 325 TABLET ORAL at 09:00

## 2021-04-14 RX ADMIN — PANTOPRAZOLE SODIUM 40 MG: 40 TABLET, DELAYED RELEASE ORAL at 06:10

## 2021-04-14 RX ADMIN — MAGNESIUM SULFATE HEPTAHYDRATE 2 G: 2 INJECTION, SOLUTION INTRAVENOUS at 18:26

## 2021-04-14 RX ADMIN — SODIUM CHLORIDE, PRESERVATIVE FREE 10 ML: 5 INJECTION INTRAVENOUS at 10:18

## 2021-04-14 RX ADMIN — DOXYCYCLINE 100 MG: 100 CAPSULE ORAL at 20:53

## 2021-04-14 RX ADMIN — MESALAMINE 1.5 G: 0.38 CAPSULE, EXTENDED RELEASE ORAL at 08:56

## 2021-04-14 RX ADMIN — METHYLPREDNISOLONE SODIUM SUCCINATE 20 MG: 40 INJECTION, POWDER, FOR SOLUTION INTRAMUSCULAR; INTRAVENOUS at 20:54

## 2021-04-14 RX ADMIN — MAGNESIUM SULFATE HEPTAHYDRATE 2 G: 2 INJECTION, SOLUTION INTRAVENOUS at 13:46

## 2021-04-14 RX ADMIN — MAGNESIUM SULFATE HEPTAHYDRATE 2 G: 2 INJECTION, SOLUTION INTRAVENOUS at 10:18

## 2021-04-14 RX ADMIN — SODIUM CHLORIDE 75 ML/HR: 9 INJECTION, SOLUTION INTRAVENOUS at 13:53

## 2021-04-14 RX ADMIN — LACTULOSE 20 G: 20 SOLUTION ORAL at 08:55

## 2021-04-14 RX ADMIN — Medication 1000 MCG: at 08:55

## 2021-04-14 RX ADMIN — METHYLPREDNISOLONE SODIUM SUCCINATE 20 MG: 40 INJECTION, POWDER, FOR SOLUTION INTRAMUSCULAR; INTRAVENOUS at 13:46

## 2021-04-14 RX ADMIN — Medication 1 EACH: at 08:56

## 2021-04-14 RX ADMIN — DOXYCYCLINE 100 MG: 100 CAPSULE ORAL at 08:55

## 2021-04-14 RX ADMIN — WARFARIN 2.5 MG: 2.5 TABLET ORAL at 20:53

## 2021-04-14 NOTE — PROGRESS NOTES
"DOS: 2021  NAME: Brittany Villeda   : 1935  PCP: Mega Esparza MD  Chief Complaint   Patient presents with   • Shortness of Breath     Neurology    Subjective: Patient continues to complain of right hand pain and currently has an ice pack in place.  She denies any significant events overnight or significant change in her weakness.    Objective:  Vital signs: /59 (BP Location: Left arm, Patient Position: Lying)   Pulse 70   Temp 97.6 °F (36.4 °C) (Oral)   Resp 18   Ht 162.6 cm (64\")   Wt 97.5 kg (215 lb)   LMP  (LMP Unknown)   SpO2 96%   BMI 36.90 kg/m²       General appearance: Well developed, well nourished, well groomed, alert and cooperative.  Obese.    HEENT: Normocephalic.   Neck and spine: Neck supple, no nuchal rigidity.  Cardiac: Regular rate and rhythm.  Peripheral Vasculature: Radial pulses are equal and symmetric.  Chest Exam: Clear to auscultation bilaterally, no wheezes, no rhonchi.  Extremities: No edema.   Skin: No rashes or birthmarks.      Higher integrative function: Oriented to time, place, person, intact recent and remote memory, attention span, concentration and language. Spontaneous speech, fund of vocabulary are normal.   CN II: Normal visual fields.   CN III IV VI: Extraocular movements are full without nystagmus. Pupils are equal, round, and reactive to light.   CN V: Normal facial sensation.  CN VII: Facial movements are symmetric, no weakness.   CN VIII: Auditory acuity is normal.   CN IX & X: Symmetric palatal movement.   CN XI: Sternocleidomastoid and trapezius are normal. No weakness.   CN XII: The tongue is midline. No atrophy or fasciculations.   Motor: Bilateral upper extremity with no resistance in deltoids.  RUE power testing limited by pain, but elbow extension/flexion at least 4/5. LUE 4/5. Lower extremities are approximately 4- out of 5 throughout but patient unable to raise lower extremities against gravity.  No fasciculations, rigidity, " spasticity or abnormal movements.   Sensation: Intact to light touch in all extremities.  Station and gait: Patient nonambulatory.  Muscle stretch reflexes: Plantar reflexes are flexor bilaterally.   Coordination: Difficulty completing finger-to-nose due to proximal weakness.  Patient reexamined, changes noted.    Scheduled Meds:docusate sodium, 100 mg, Oral, Nightly  doxycycline, 100 mg, Oral, Q12H  lactulose, 20 g, Oral, BID  mesalamine, 1.5 g, Oral, Daily  methylPREDNISolone sodium succinate, 20 mg, Intravenous, Q12H  pantoprazole, 40 mg, Oral, QAM  sodium chloride, 10 mL, Intravenous, Q12H  vitamin B-12, 1,000 mcg, Oral, Daily  warfarin, 2.5 mg, Oral, Once per day on Sun Tue Wed Fri  warfarin, 5 mg, Oral, Once per day on Mon Thu Sat  wheat dextrin, 1 each, Oral, Daily      Continuous Infusions:Pharmacy Consult - Pharmacy to dose,   Pharmacy to dose warfarin,   sodium chloride, 75 mL/hr, Last Rate: 75 mL/hr (04/14/21 1353)      PRN Meds:.•  acetaminophen **OR** acetaminophen **OR** acetaminophen  •  albuterol sulfate HFA  •  calcium carbonate  •  magnesium sulfate **OR** magnesium sulfate **OR** magnesium sulfate  •  ondansetron **OR** ondansetron  •  Pharmacy Consult - Pharmacy to dose  •  Pharmacy to dose warfarin  •  sodium chloride    Laboratory results:  Lab Results   Component Value Date    GLUCOSE 90 04/14/2021    CALCIUM 9.3 04/14/2021     (L) 04/14/2021    K 3.7 04/14/2021    CO2 22.0 04/14/2021     04/14/2021    BUN 7 (L) 04/14/2021    CREATININE 0.48 (L) 04/14/2021    EGFRIFAFRI 149 04/14/2021    EGFRIFNONA 48 (L) 06/19/2020    BCR 14.6 04/14/2021    ANIONGAP 8.0 04/14/2021     Lab Results   Component Value Date    WBC 4.68 04/14/2021    HGB 12.0 04/14/2021    HCT 35.8 04/14/2021    MCV 98.9 (H) 04/14/2021     04/14/2021     Lab Results   Component Value Date    CHOL 141 12/25/2017     Lab Results   Component Value Date    HDL 64 (H) 12/25/2017     Lab Results   Component Value Date     LDL 66 12/25/2017     Lab Results   Component Value Date    TRIG 55 12/25/2017         Lab 04/13/21  0552   HEMOGLOBIN A1C 4.60*      Review and interpretation of imaging:  CERVICAL SPINE MRI WITHOUT CONTRAST     HISTORY: Unable to stand. Bilateral lower extremity weakness.     TECHNIQUE: Cervical spine MRI was performed without contrast. Image  quality is poor due to extensive patient motion. To avoid delay in the  patient's care, I am proceeding with the report. Thoracic and lumbar MRI  scans were performed today as well.     FINDINGS: The visualized posterior fossa contents appear normal.  Vertebral body height and marrow signal are normal. There is no  subluxation.     The spinal cord demonstrates normal caliber and signal.     At C3-4, there is loss of disc height with what appears to be posterior  midline disc bulge and hypertrophic change of the posterior elements  combining to create severe stenosis of the spinal canal. There is no CSF  signal anterior or posterior to the cord at this level but only minimal  cord remodeling and no cord signal abnormality identified.     There are degenerative disc changes at C4-5, C5-6, C6-7, and C7-T1, but  there does not appear to be significant canal stenosis at any of those  levels. There is multilevel facet arthropathy and foraminal stenosis,  poorly delineated.     There appears to be massive enlargement of the thyroid gland with  multiple cystic foci.     IMPRESSION:  Severe spinal canal stenosis due to chronic-appearing  degenerative change at C3-4. There is no cord signal abnormality. Note  that fine anatomic detail is markedly limited and detail of foraminal  stenosis is poor.     This report was finalized on 4/13/2021 4:56 PM by Dr. Jp Jackson M.D.     THORACIC SPINE MRI WITHOUT CONTRAST     HISTORY: Bilateral lower extremity weakness with difficulty standing.  Total spine MRI performed today. Cervical and lumbar exams are reported  separately.      TECHNIQUE: Thoracic spine MRI was performed without contrast and is  correlated with chest CT performed yesterday. There is excessive patient  motion on this exam which severely compromises fine anatomic detail.  However, there is yield regarding the spinal canal and the spinal cord  and so I am proceeding with the report to avoid delay in the patient's  care.     FINDINGS: There is pronounced thoracic kyphosis. Marrow signal  throughout the thoracic spine appears normal. The spinal cord appears  normal in caliber throughout its length and grossly normal in signal  throughout its length. It is possible that a small spinal cord lesion  might not be demonstrated on this exam but there is no large area of  signal or contour abnormality in the cord. The thoracic spinal canal  appears widely patent at every level. There is degenerative disc change  throughout the thoracic spine but no marrow edema or significant  compression deformity.     Paraspinous musculature is deconditioned. No paraspinal mass lesion is  present. Visualized posterior mediastinal structures are normal and  there is no pleural effusion.     IMPRESSION:  Fine anatomic detail is limited due to motion. There is no  spinal canal stenosis or cord signal abnormality identified in the  thoracic spine.     This report was finalized on 4/13/2021 5:15 PM by Dr. Jp Jackson M.D.   LUMBAR SPINE MRI WITH AND WITHOUT CONTRAST     HISTORY: Bilateral lower extremity weakness with difficulty standing.  Prior lumbar spine surgery.     TECHNIQUE: Lumbar MRI was performed before and after the IV  administration of 20 mL of MultiHance contrast. Cervical and thoracic  spine MRI scans were performed today as well and are reported  separately. This exam is correlated with abdomen and pelvis CT performed  yesterday.     FINDINGS: There has been previous L4 laminectomy and there appears to be  solid ankylosis across posterior elements at L4-5. There is posterior  mojgan  and pedicle screw fusion hardware remaining along the left side at  that level. This level is fused with 7 mm anterolisthesis of 4 on 5.  There is 4 mm anterolisthesis of L3 on L4. Alignment is otherwise  normal. The tip of the conus lies at the lower L1 endplate level.     Vertebral body height and marrow signal are normal throughout the lumbar  spine.     There is less motion artifact on the lumbar series, perhaps as it was  the first of the three scans. Patients generally do not tolerate three  MRI scans contiguously without substantial motion on the later scans.     At L1-2 and L2-3, disc spaces are preserved and the canal and foramina  are patent.     At L3-4, there is complete loss of disc height with hypertrophic facet  change. The axial T2 sequences through this level are nondiagnostic due  to motion. There appears to be narrowing of the left lateral recess with  only mild stenosis of the spinal canal and the neural foramina.     At L4-5, there is postsurgical change as delineated above. The canal and  foramina are patent.     At L5-S1, disc height is preserved and there is hypertrophic facet  change. The canal is patent. The foramina are mildly stenotic.     There is no abnormal contrast enhancement. Paraspinous musculature is  atrophic.     IMPRESSION:  Postoperative change from L4-5 fusion with L4 laminectomy.  There is mild lateral recess narrowing and foraminal stenosis at the  transitional L3-4 level. No high-grade stenosis is present.     This report was finalized on 4/13/2021 5:15 PM by Dr. Jp Jackson M.D.       Impression:  This patient is an 86-year-old female, former smoker, with HTN, hypothyroidism, PAF on warfarin, s/p PPM, HUGO, COPD, ulcerative colitis, previous kidney stone, and MGUS who presented 4/7 with shortness of breath and weakness.  The patient tells me she was admitted in November 2020 with difficulty walking.  Per quick review of chart at that time patient was found to have  hemarthrosis and an ankle injury.  She states she was bedridden for some time after that discharge.  She has recently been working with physical therapy and is now able to ambulate from her bedroom to the living room with assist x2 and a walker.  Starting Saturday 4/3 she has had worsening weakness in bilateral lower extremities, shortness of air, and lightheadedness prompting presentation to the ED.  She does note some low back pain as well as left groin discomfort with pains radiating into the left lower extremity.  She describes some nonpainful right lower extremity jerking/spasm.  INR was 2.15 on admission, UA positive (started on rocephin), chest x-ray and CT head were negative for acute findings.  She was initially evaluated by Dr. Daniels who has ordered an MRI of the lumbar spine.  She also notes upper extremity weakness and difficulty feeding herself.  She is currently being worked up for hypercalcemia.     Work-up:  CT head: No acute findings.  Mild atrophy and small vessel disease noted.  2D echo: EF 60%, severely dilated left atrial cavity size, no spontaneous echo contrast, saline test results negative.  CT abdomen/pelvis: No evidence of neoplasm.  Markedly enlarged thyroid, also noted on previous study from August 2020.  MRI C-spine without: Severe spinal canal stenosis due to chronic appearing degenerative change at C3-4.  No cord signal abnormality.  MRI thoracic spine: Motion degraded but no spinal canal stenosis or Cord signal abnormality in the thoracic spine.  MRI L-spine with and without contrast: Postoperative changes from L4-5 fusion with L4 laminectomy.  Mild lateral recess narrowing and foraminal stenosis at the transitional L3-4 level.  No high-grade stenosis present.  Labs: Folate 7.8, PTH 67.9, TSH 0.54, B12 1355,  /211,  on 4/13.  Covid testing negative this admission.  Hemoglobin A1c 4.60%, CRP 8.20, sedimentation rate 41        Diagnoses:  Proximal muscle  weakness-subacute.  Etiology not clear. CKs are only mildly elevated arguing against myopathy.  May be a combination of deconditioning plus or minus myelopathy.  E. coli UTI  Hypercalcemia  History of A. fib on warfarin  History of HUGO  History of ulcerative colitis  The above impression statement reviewed and changes noted.    Plan:  No cause of progressive weakness identified on total spine imaging.  Primary team planning a trial of steroids.  Will check EMG.  Patient's son Nas was updated on MRI imaging results via telephone.  Discussed with Dr. Gomez, Dr. Cherry, and Dr. Arnold.  Will follow.  Encourage patient to participate in PT as she will only continue to get weaker with disuse.

## 2021-04-14 NOTE — PLAN OF CARE
Goal Outcome Evaluation:      Pt had several xrays this shift.  Pt had one tearful and anxious episode this morning but is better this evening.  Pt's daughter at bedside and active in care.  Pt with poor appetite today and encouraged to eat meals.  Neuro oncology consulted this am and has assessed pt.  Pt has been resting quietly this evening.  VSS.  Pt needs met this shift.  Will continue to monitor

## 2021-04-14 NOTE — PLAN OF CARE
Goal Outcome Evaluation:    Pt slept well with no complaints this shift. IV fluids. Incontinence care provided. No changes overnight. VSS, will continue to monitor.

## 2021-04-14 NOTE — PROGRESS NOTES
Pharmacy Consult: Warfarin Dosing/ Monitoring    Brittany Villeda is a 86 y.o. female, estimated creatinine clearance is 57.2 mL/min (A) (by C-G formula based on SCr of 0.48 mg/dL (L)). weighing 97.5 kg (215 lb).    PMH: Acute UTI (2021), Anemia, Arrhythmia, Arthritis, Asthma, Bradycardia, Chest pain, Colitis, COPD, Diastolic dysfunction, Essential hypertension (2016), GERD, Heart block, Hypertension, Hypertensive heart disease, Hyperthyroidism, Kidney stone, Leukopenia, Low back pain, MGUS (monoclonal gammopathy of unknown significance), Nephrolithiasis, Obesity, Paroxysmal atrial fibrillation, Peptic ulceration, PSVT (paroxysmal supraventricular tachycardia), Pulmonary hypertension, Rectal bleed, Sleep apnea, Trifascicular block, Ulcerative rectosigmoiditis without complication, Ventricular tachycardia, and Vertigo.    Social History     Tobacco Use    Smoking status: Former Smoker     Packs/day: 1.50     Years: 10.00     Pack years: 15.00     Start date:      Quit date:      Years since quittin.3    Smokeless tobacco: Never Used    Tobacco comment: QUIT SMOKING    Substance Use Topics    Alcohol use: No     Comment: Daily caffeine use - one cup of coffee    Drug use: Defer     Results from last 7 days   Lab Units 21  0701 21  0553 21  0645 21  0431 04/10/21  0642 21  0725 21  0912 21  2335   INR  1.90* 2.17* 1.97* 1.99* 2.25* 2.21*  --  2.15*   HEMOGLOBIN g/dL 12.0  --  12.9 13.2 12.5 12.9 13.2 12.2   HEMATOCRIT % 35.8  --  36.8 41.1 38.1 38.3 38.5 37.7   PLATELETS 10*3/mm3 170  --  165 146 144 152 142 163     Results from last 7 days   Lab Units 21  0701 21  1031 21  0645 21  0431   SODIUM mmol/L 135*  --  137 138   POTASSIUM mmol/L 3.7  --  4.2 4.1   CHLORIDE mmol/L 105  --  105 102   CO2 mmol/L 22.0  --  23.8 24.3   BUN mg/dL 7*  --  12 11   CREATININE mg/dL 0.48*  --  0.55* 0.55*   CALCIUM mg/dL 9.3 10.0 10.0 10.7*    BILIRUBIN mg/dL 2.1*  --  1.9* 1.9*   ALK PHOS U/L 81  --  80 77   ALT (SGPT) U/L 15  --  11 8   AST (SGOT) U/L 24  --  17 22   GLUCOSE mg/dL 90  --  95 86     Anticoagulation history: Warfarin therapy is managed outpatient by the [facility]. Patient takes Warfarin 5 mg po q Mon,Thu,Sat and 2.5 mg all other days    Hospital Anticoagulation:  Consulting provider: SHAHID Randall  Start date: 4/8/21 continued from home  Indication: Atrial fibrillation  Target INR: 2.0-3.0  Expected duration: Indefinite   Bridge Therapy: No                  Date 4/8 4/9  4/10  4/11 4/12 4/13 4/14      INR 2.15 2.21  2.25 1.99 1.97 2.17 1.90      Warfarin dose 5 mg 2.5 mg  5 mg  2.5 mg 5 mg 2.5 mg 2.5 mg        Potential drug interactions:  1) Ceftriaxone - may enhance the effect of warfarin  2) Acetaminophen - may result in an increased risk of bleeding.   3) Doxycycline - may result in an increased risk of bleeding.   4) Mesalamine - may result in decreased warfarin efficacy.   5) Pantoprazole - may result in increased International Normalized Ratio (INR) and prothrombin time.     Relevant nutrition status: Regular, cardiac diet    Other: Albumin: 2.90 g/dL (4/14/21)    Education complete?/ Date: [facility]    Assessment/Plan:    1) Continue outpatient warfarin regimen: Warfarin 5 mg po q Mon,Thu,Sat and 2.5 mg all other days (INR= 1.90 today)  2) Monitor for signs/symptoms of bleeding  3) Follow up INR tomorrow    Pharmacy will continue to follow until discharge or discontinuation of warfarin.     Moraima Cabrales, [facility]  Clinical Staff Pharmacist

## 2021-04-14 NOTE — PROGRESS NOTES
Name: Brittany Villeda ADMIT: 2021   : 1935  PCP: Mega Esparza MD    MRN: [id number] LOS: 0 days   AGE/SEX: 86 y.o. female  ROOM: [facility]     Subjective   Subjective   Patient continues with right hand and wrist pain associated with swelling and redness.  She reports that the extremity pain is now localized to the shoulders and both hips.  Continues with weakness of the lower extremity and upper extremity.  No headache no double vision no loss of the vision no slurring of the speech.  No loss of consciousness or seizures.  Continues to be poorly motivated to participate in physical therapy..      Review of Systems    Cardiovascular/respiratory.  No chest pain.  No shortness of breath.  No palpitation.  GI.  No abdominal pain or nausea or vomiting.   .  No dysuria or hematuria.  No incontinence.     Objective   Objective   Vital Signs  Temp:  [97.6 °F (36.4 °C)-98.9 °F (37.2 °C)] 97.6 °F (36.4 °C)  Heart Rate:  [67-90] 70  Resp:  [16-18] 18  BP: (111-154)/() 122/59  SpO2:  [91 %-98 %] 96 %  on   ;   Device (Oxygen Therapy): room air    Intake/Output Summary (Last 24 hours) at 2021 1126  Last data filed at 2021 1018  Gross per 24 hour   Intake 530 ml   Output --   Net 530 ml     Body mass index is 36.9 kg/m².      21  0145 21  1106   Weight: 97.5 kg (215 lb) 97.5 kg (215 lb)     Physical Exam    General.  Elderly female.  Alert oriented x3 no apparent pain distress or diaphoresis normal mood and affect  Eyes.  Pupils equal round and reactive intact extraocular musculature no pallor or jaundice  Neck.  Supple no JVD no lymphadenopathy no thyromegaly  Cardiovascular.  Regular rate and rhythm with grade 2 systolic murmur.  Chest.  Clear to auscultation bilaterally with no added sounds  Abdomen.  Soft lax no tenderness no organomegaly no guarding or  Extremities.  No clubbing cyanosis or edema  CNS.  Proximal muscle weakness of both lower extremities and both upper  extremities more so on the lower extremities than the upper(no change from yesterday) otherwise negative  Musculoskeletal.  Redness/hotness/tenderness of the right wrist with decreased range of movement.  Painful bilateral shoulders and the head range of movement.    Results Review:      Results from last 7 days   Lab Units 04/14/21  0701 04/12/21  1031 04/12/21  0645 04/11/21  0431 04/10/21  0642 04/09/21  0725 04/08/21  0548 04/07/21  2335   SODIUM mmol/L 135*  --  137 138 139 138 140 139   POTASSIUM mmol/L 3.7  --  4.2 4.1 4.3 4.3 4.7 4.4   CHLORIDE mmol/L 105  --  105 102 106 105 112* 106   CO2 mmol/L 22.0  --  23.8 24.3 26.2 26.8 21.0* 24.6   BUN mg/dL 7*  --  12 11 11 10 9 12   CREATININE mg/dL 0.48*  --  0.55* 0.55* 0.63 0.65 0.52* 0.68   GLUCOSE mg/dL 90  --  95 86 82 85 76 108*   CALCIUM mg/dL 9.3 10.0 10.0 10.7* 10.4 10.2 8.4* 9.7   AST (SGOT) U/L 24  --  17 22 16  --   --  16   ALT (SGPT) U/L 15  --  11 8 8  --   --  9     Estimated Creatinine Clearance: 57.2 mL/min (A) (by C-G formula based on SCr of 0.48 mg/dL (L)).  Results from last 7 days   Lab Units 04/13/21  0552   HEMOGLOBIN A1C % 4.60*         Results from last 7 days   Lab Units 04/13/21  0552 04/11/21  1247 04/10/21  0642 04/07/21  2335   CK TOTAL U/L 194* 211* 194*  --    TROPONIN T ng/mL  --   --   --  <0.010     Results from last 7 days   Lab Units 04/07/21  2335   PROBNP pg/mL 1,228.0     Results from last 7 days   Lab Units 04/11/21  1247 04/09/21  0725   TSH uIU/mL 0.544 0.509     Results from last 7 days   Lab Units 04/14/21  0701 04/12/21  1031 04/11/21  1247   MAGNESIUM mg/dL 1.5* 1.5* 1.6           Invalid input(s): LDLCALC  Results from last 7 days   Lab Units 04/14/21  0701 04/12/21  0645 04/11/21  0431 04/11/21  0431 04/10/21  0642 04/09/21  0725 04/08/21  0912 04/07/21  2335 04/07/21  2335   WBC 10*3/mm3 4.68 4.40  --  4.85 3.73 3.30* 3.01*  --  3.64   HEMOGLOBIN g/dL 12.0 12.9  --  13.2 12.5 12.9 13.2  --  12.2   HEMATOCRIT %  35.8 36.8  --  41.1 38.1 38.3 38.5  --  37.7   PLATELETS 10*3/mm3 170 165  --  146 144 152 142  --  163   MCV fL 98.9* 95.8   < > 103.0* 98.4* 97.5* 97.5*   < > 99.0*   MCH pg 33.1* 33.6*   < > 33.1* 32.3 32.8 33.4*   < > 32.0   MCHC g/dL 33.5 35.1   < > 32.1 32.8 33.7 34.3   < > 32.4   RDW % 12.5 12.5   < > 13.0 12.7 12.8 12.7   < > 13.1   RDW-SD fl 45.5 43.1   < > 50.0 45.2 45.4 44.9   < > 46.8   MPV fL 9.2 9.5   < > 9.5 9.5 9.3 9.1   < > 9.3   NEUTROPHIL % % 52.8  --   --  47.4  --  39.7*  --    < >  --    LYMPHOCYTE % % 31.4  --   --  35.5  --  45.5*  --    < >  --    MONOCYTES % % 13.5*  --   --  14.0*  --  10.6  --    < >  --    EOSINOPHIL % % 1.5  --   --  2.5  --  3.0  --    < >  --    BASOPHIL % % 0.6  --   --  0.4  --  0.9  --    < >  --    IMM GRAN % % 0.2  --   --  0.2  --   --   --    < >  --    NEUTROS ABS 10*3/mm3 2.47  --   --  2.30  --  1.31*  --    < > 1.26*   LYMPHS ABS 10*3/mm3 1.47  --   --  1.72  --  1.50  --    < >  --    MONOS ABS 10*3/mm3 0.63  --   --  0.68  --  0.35  --    < >  --    EOS ABS 10*3/mm3 0.07  --   --  0.12  --  0.10  --    < > 0.04   BASOS ABS 10*3/mm3 0.03  --   --  0.02  --  0.03  --    < > 0.04   IMMATURE GRANS (ABS) 10*3/mm3 0.01  --   --  0.01  --   --   --    < >  --    NRBC /100 WBC 0.0  --   --  0.0  --   --   --   --  0.0    < > = values in this interval not displayed.     Results from last 7 days   Lab Units 04/14/21  0701 04/13/21  0553 04/12/21  0645 04/11/21  0431 04/10/21  0642 04/09/21  0725 04/07/21  2335   INR  1.90* 2.17* 1.97* 1.99* 2.25* 2.21* 2.15*         Results from last 7 days   Lab Units 04/08/21  0548   PROCALCITONIN ng/mL 0.05     Results from last 7 days   Lab Units 04/13/21  0552 04/12/21  1031   SED RATE mm/hr 41* 43*   CRP mg/dL 8.20*  --          Results from last 7 days   Lab Units 04/08/21  0107   URINECX  >100,000 CFU/mL Escherichia coli*         Results from last 7 days   Lab Units 04/08/21  0107   NITRITE UA  Positive*   WBC UA /HPF  31-50*   BACTERIA UA /HPF 4+*   SQUAM EPITHEL UA /HPF 0-2   URINECX  >100,000 CFU/mL Escherichia coli*     Results from last 7 days   Lab Units 04/13/21  1222   URIC ACID mg/dL 4.9       Imaging:  Imaging Results (Last 24 Hours)     Procedure Component Value Units Date/Time    XR Wrist 3+ View Right [[id number]] Collected: 04/13/21 1811     Updated: 04/13/21 1823    Narrative:      RIGHT WRIST X-RAY     HISTORY: Wrist pain after the hand was squeezed.     TECHNIQUE: Four x-rays of the right hand are provided.     FINDINGS: The bones are demineralized. There is osteoarthritic change  along the radial side of the wrist. There is no evidence of fracture.       Impression:      No acute abnormality identified at the right wrist.     This report was finalized on 4/13/2021 6:20 PM by Dr. Jp Jackson M.D.       MRI Lumbar Spine With & Without Contrast [[id number]] Collected: 04/13/21 1714     Updated: 04/13/21 1718    Narrative:      LUMBAR SPINE MRI WITH AND WITHOUT CONTRAST     HISTORY: Bilateral lower extremity weakness with difficulty standing.  Prior lumbar spine surgery.     TECHNIQUE: Lumbar MRI was performed before and after the IV  administration of 20 mL of MultiHance contrast. Cervical and thoracic  spine MRI scans were performed today as well and are reported  separately. This exam is correlated with abdomen and pelvis CT performed  yesterday.     FINDINGS: There has been previous L4 laminectomy and there appears to be  solid ankylosis across posterior elements at L4-5. There is posterior  mojgan and pedicle screw fusion hardware remaining along the left side at  that level. This level is fused with 7 mm anterolisthesis of 4 on 5.  There is 4 mm anterolisthesis of L3 on L4. Alignment is otherwise  normal. The tip of the conus lies at the lower L1 endplate level.     Vertebral body height and marrow signal are normal throughout the lumbar  spine.     There is less motion artifact on the lumbar series, perhaps as  it was  the first of the three scans. Patients generally do not tolerate three  MRI scans contiguously without substantial motion on the later scans.     At L1-2 and L2-3, disc spaces are preserved and the canal and foramina  are patent.     At L3-4, there is complete loss of disc height with hypertrophic facet  change. The axial T2 sequences through this level are nondiagnostic due  to motion. There appears to be narrowing of the left lateral recess with  only mild stenosis of the spinal canal and the neural foramina.     At L4-5, there is postsurgical change as delineated above. The canal and  foramina are patent.     At L5-S1, disc height is preserved and there is hypertrophic facet  change. The canal is patent. The foramina are mildly stenotic.     There is no abnormal contrast enhancement. Paraspinous musculature is  atrophic.       Impression:      Postoperative change from L4-5 fusion with L4 laminectomy.  There is mild lateral recess narrowing and foraminal stenosis at the  transitional L3-4 level. No high-grade stenosis is present.     This report was finalized on 4/13/2021 5:15 PM by Dr. Jp Jackson M.D.       MRI Thoracic Spine Without Contrast [[id number]] Collected: 04/13/21 1703     Updated: 04/13/21 1718    Narrative:      THORACIC SPINE MRI WITHOUT CONTRAST     HISTORY: Bilateral lower extremity weakness with difficulty standing.  Total spine MRI performed today. Cervical and lumbar exams are reported  separately.     TECHNIQUE: Thoracic spine MRI was performed without contrast and is  correlated with chest CT performed yesterday. There is excessive patient  motion on this exam which severely compromises fine anatomic detail.  However, there is yield regarding the spinal canal and the spinal cord  and so I am proceeding with the report to avoid delay in the patient's  care.     FINDINGS: There is pronounced thoracic kyphosis. Marrow signal  throughout the thoracic spine appears normal. The spinal  cord appears  normal in caliber throughout its length and grossly normal in signal  throughout its length. It is possible that a small spinal cord lesion  might not be demonstrated on this exam but there is no large area of  signal or contour abnormality in the cord. The thoracic spinal canal  appears widely patent at every level. There is degenerative disc change  throughout the thoracic spine but no marrow edema or significant  compression deformity.     Paraspinous musculature is deconditioned. No paraspinal mass lesion is  present. Visualized posterior mediastinal structures are normal and  there is no pleural effusion.       Impression:      Fine anatomic detail is limited due to motion. There is no  spinal canal stenosis or cord signal abnormality identified in the  thoracic spine.     This report was finalized on 4/13/2021 5:15 PM by Dr. Jp Jackson M.D.       MRI Cervical Spine Without Contrast [[id number]] Collected: 04/13/21 1646     Updated: 04/13/21 1659    Narrative:      CERVICAL SPINE MRI WITHOUT CONTRAST     HISTORY: Unable to stand. Bilateral lower extremity weakness.     TECHNIQUE: Cervical spine MRI was performed without contrast. Image  quality is poor due to extensive patient motion. To avoid delay in the  patient's care, I am proceeding with the report. Thoracic and lumbar MRI  scans were performed today as well.     FINDINGS: The visualized posterior fossa contents appear normal.  Vertebral body height and marrow signal are normal. There is no  subluxation.     The spinal cord demonstrates normal caliber and signal.     At C3-4, there is loss of disc height with what appears to be posterior  midline disc bulge and hypertrophic change of the posterior elements  combining to create severe stenosis of the spinal canal. There is no CSF  signal anterior or posterior to the cord at this level but only minimal  cord remodeling and no cord signal abnormality identified.     There are degenerative  disc changes at C4-5, C5-6, C6-7, and C7-T1, but  there does not appear to be significant canal stenosis at any of those  levels. There is multilevel facet arthropathy and foraminal stenosis,  poorly delineated.     There appears to be massive enlargement of the thyroid gland with  multiple cystic foci.       Impression:      Severe spinal canal stenosis due to chronic-appearing  degenerative change at C3-4. There is no cord signal abnormality. Note  that fine anatomic detail is markedly limited and detail of foraminal  stenosis is poor.     This report was finalized on 4/13/2021 4:56 PM by Dr. Jp Jackson M.D.                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]  No   • Hypomagnesemia [E83.42]  No   • Macrocytosis [D75.89]  Yes   • Hypercalcemia [E83.52]  No   • Dysautonomia (CMS/HCC) [G90.1]  Yes   • Weakness of both lower extremities [R29.898]  Yes   • Acute UTI (urinary tract infection) [N39.0]  Yes   • Chronic anticoagulation [Z79.01]  Not Applicable   • Immobility [Z74.09]  Yes   • Obesity (BMI 30-39.9) [E66.9]  Yes   • Atrial fibrillation, persistent (CMS/HCC) [I48.19]  Yes   • 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  Differential diagnosis myopathy/peripheral neuropathy/CNS demyelinating disease.. MRI of the C/T/L-spine significant for C3-4 severe  spinal stenosis but no cord abnormalities.  CK only mildly elevated arguing against myopathy...  Potassium is normal.  TSH is normal.  Low magnesium, being substituted.  Hypercalcemia now almost completely corrected.  Not on steroids or other medications that can cause myopathy.. PT and OT working with the patient but she is unmotivated.  Neurology following.  Alex up for peripheral neuropathy closes that includes pending cryoglobulin/lupus anticoagulant/serum electrophoresis/heavy metal screen.  Folate, cortisol, TSH, B12 are normal.  Uric acid is normal.  X-ray of the right wrist without significant disease.  I really believe the patient would benefit from EMG study and possibly muscle/nerve biopsy.  We will ask neurosurgery to evaluate for C3-4 severe stenosis.  Awaiting neurology follow-up.  We will also check shoulders and hip x-rays to rule out severe arthritis there.  We will try a low-dose IV steroids.  · E. coli UTI sensitive to doxycycline we will switch from Rocephin to doxycycline patient is asymptomatic.  · History of atrial fibrillation/diastolic congestive heart failure/hypertension tension/pulmonary hypertension.  BNP is normal.  Rate is controlled.  Blood pressure is controlled.  Currently on Coumadin with therapeutic INR.  Will monitor.  No signs of angina or congestive heart failure.  Echo with normal ejection fraction right atrial dilatation left atrial dilation and diastolic dysfunction.  Cardiology okayed MRI.  · History of ulcerative colitis.  Continue Protonix and mesalamine.  Stable with negative GI examination.  · History of obstructive sleep apnea.  Nocturnal oxygen.  · Hyperbilirubinemia.  Intermittent problem.  Suspect Gilbert's disease. Benign GI examination.  Will monitor.  · Hypercalcemia.  Elevated ionized calcium.  Improving.  Cont. Slow IV fluid.  . calcium and vitamin D DC'd.  Parathyroid hormone is elevated.  Vitamin D level is normal.  ESR and C-reactive protein are elevated.   Awaiting calcitonin/serum protein immunofixation.  CT of the chest, abdomen, pelvis with no evidence of tumors.  This is mostly hyperparathyroidism.  Calcium level has been improving.  Status post Zometa treatment. .  Hypercalcemia slowly improving.  Will monitor..  · Hypomagnesemia.  Will replace and monitor.  · Macrocytosis.  Normal B12/folate/cortisol and TSH.   · Constipation. resolved.  Ontinue Colace and add lactulose  · Right wrist inflammatory arthritis.  Normal uric acid.  X-ray negative.  CCP/ALEC.  Will initiate low-dose steroids.  · Discussed with patient/nurse.      Cedric Cee MD  [facility]  04/14/21  11:26 EDT      Addendum.  4/14/2021 at 3:18 PM.  I discussed with neurology service the case.  No obvious scoliosis for the progressive muscle weakness.  We are still waiting for the immunological studies and peripheral neuropathy work-up.  Neurology planning EMG.  We will proceed with a trial of steroids.  If EMG is going to take more than 2 days to get home this can be done as an outpatient.  Patient has refused skilled facility placement and home health before.  I am hoping to discharge the patient in 1 to 2 days.

## 2021-04-15 ENCOUNTER — READMISSION MANAGEMENT (OUTPATIENT)
Dept: CALL CENTER | Facility: HOSPITAL | Age: 86
End: 2021-04-15

## 2021-04-15 ENCOUNTER — TELEPHONE (OUTPATIENT)
Dept: NEUROLOGY | Facility: CLINIC | Age: 86
End: 2021-04-15

## 2021-04-15 VITALS
OXYGEN SATURATION: 95 % | BODY MASS INDEX: 36.7 KG/M2 | SYSTOLIC BLOOD PRESSURE: 126 MMHG | TEMPERATURE: 97.6 F | RESPIRATION RATE: 16 BRPM | HEART RATE: 79 BPM | HEIGHT: 64 IN | WEIGHT: 215 LBS | DIASTOLIC BLOOD PRESSURE: 64 MMHG

## 2021-04-15 PROBLEM — E83.52 HYPERCALCEMIA: Status: RESOLVED | Noted: 2021-04-11 | Resolved: 2021-04-15

## 2021-04-15 PROBLEM — N39.0 ACUTE UTI (URINARY TRACT INFECTION): Status: RESOLVED | Noted: 2021-04-08 | Resolved: 2021-04-15

## 2021-04-15 PROBLEM — E83.42 HYPOMAGNESEMIA: Status: RESOLVED | Noted: 2021-04-13 | Resolved: 2021-04-15

## 2021-04-15 LAB
ALBUMIN SERPL-MCNC: 3.1 G/DL (ref 3.5–5.2)
ALBUMIN/GLOB SERPL: 0.8 G/DL
ALP SERPL-CCNC: 89 U/L (ref 39–117)
ALT SERPL W P-5'-P-CCNC: 16 U/L (ref 1–33)
ANION GAP SERPL CALCULATED.3IONS-SCNC: 9.7 MMOL/L (ref 5–15)
AST SERPL-CCNC: 37 U/L (ref 1–32)
BASOPHILS # BLD AUTO: 0.01 10*3/MM3 (ref 0–0.2)
BASOPHILS NFR BLD AUTO: 0.2 % (ref 0–1.5)
BILIRUB SERPL-MCNC: 1.1 MG/DL (ref 0–1.2)
BUN SERPL-MCNC: 8 MG/DL (ref 8–23)
BUN/CREAT SERPL: 18.2 (ref 7–25)
CA-I BLD-MCNC: 5.4 MG/DL (ref 4.6–5.4)
CA-I SERPL ISE-MCNC: 1.36 MMOL/L (ref 1.15–1.35)
CALCIUM SPEC-SCNC: 8.6 MG/DL (ref 8.6–10.5)
CENTROMERE B AB SER-ACNC: <0.2 AI (ref 0–0.9)
CHLORIDE SERPL-SCNC: 107 MMOL/L (ref 98–107)
CHROMATIN AB SERPL-ACNC: 0.6 AI (ref 0–0.9)
CO2 SERPL-SCNC: 20.3 MMOL/L (ref 22–29)
CREAT SERPL-MCNC: 0.44 MG/DL (ref 0.57–1)
DEPRECATED RDW RBC AUTO: 44.5 FL (ref 37–54)
DSDNA AB SER-ACNC: <1 IU/ML (ref 0–9)
ENA JO1 AB SER-ACNC: <0.2 AI (ref 0–0.9)
ENA RNP AB SER-ACNC: 2.2 AI (ref 0–0.9)
ENA SCL70 AB SER-ACNC: <0.2 AI (ref 0–0.9)
ENA SM AB SER-ACNC: <0.2 AI (ref 0–0.9)
ENA SS-A AB SER-ACNC: <0.2 AI (ref 0–0.9)
ENA SS-B AB SER-ACNC: <0.2 AI (ref 0–0.9)
EOSINOPHIL # BLD AUTO: 0 10*3/MM3 (ref 0–0.4)
EOSINOPHIL NFR BLD AUTO: 0 % (ref 0.3–6.2)
ERYTHROCYTE [DISTWIDTH] IN BLOOD BY AUTOMATED COUNT: 12.3 % (ref 12.3–15.4)
GFR SERPL CREATININE-BSD FRML MDRD: >150 ML/MIN/1.73
GLOBULIN UR ELPH-MCNC: 4.1 GM/DL
GLUCOSE SERPL-MCNC: 143 MG/DL (ref 65–99)
HCT VFR BLD AUTO: 36.2 % (ref 34–46.6)
HGB BLD-MCNC: 12.3 G/DL (ref 12–15.9)
IMM GRANULOCYTES # BLD AUTO: 0.01 10*3/MM3 (ref 0–0.05)
IMM GRANULOCYTES NFR BLD AUTO: 0.2 % (ref 0–0.5)
INR PPP: 1.82 (ref 0.9–1.1)
LYMPHOCYTES # BLD AUTO: 0.96 10*3/MM3 (ref 0.7–3.1)
LYMPHOCYTES NFR BLD AUTO: 20.9 % (ref 19.6–45.3)
Lab: ABNORMAL
MAGNESIUM SERPL-MCNC: 2.6 MG/DL (ref 1.6–2.4)
MCH RBC QN AUTO: 33.4 PG (ref 26.6–33)
MCHC RBC AUTO-ENTMCNC: 34 G/DL (ref 31.5–35.7)
MCV RBC AUTO: 98.4 FL (ref 79–97)
MONOCYTES # BLD AUTO: 0.12 10*3/MM3 (ref 0.1–0.9)
MONOCYTES NFR BLD AUTO: 2.6 % (ref 5–12)
NEUTROPHILS NFR BLD AUTO: 3.49 10*3/MM3 (ref 1.7–7)
NEUTROPHILS NFR BLD AUTO: 76.1 % (ref 42.7–76)
NRBC BLD AUTO-RTO: 0 /100 WBC (ref 0–0.2)
PLATELET # BLD AUTO: 183 10*3/MM3 (ref 140–450)
PMV BLD AUTO: 9.3 FL (ref 6–12)
POTASSIUM SERPL-SCNC: 4.1 MMOL/L (ref 3.5–5.2)
PROT SERPL-MCNC: 7.2 G/DL (ref 6–8.5)
PROTHROMBIN TIME: 20.8 SECONDS (ref 11.7–14.2)
RBC # BLD AUTO: 3.68 10*6/MM3 (ref 3.77–5.28)
SODIUM SERPL-SCNC: 137 MMOL/L (ref 136–145)
WBC # BLD AUTO: 4.59 10*3/MM3 (ref 3.4–10.8)

## 2021-04-15 PROCEDURE — 82330 ASSAY OF CALCIUM: CPT | Performed by: INTERNAL MEDICINE

## 2021-04-15 PROCEDURE — 25010000002 METHYLPREDNISOLONE PER 40 MG: Performed by: INTERNAL MEDICINE

## 2021-04-15 PROCEDURE — 85610 PROTHROMBIN TIME: CPT | Performed by: INTERNAL MEDICINE

## 2021-04-15 PROCEDURE — 80053 COMPREHEN METABOLIC PANEL: CPT | Performed by: INTERNAL MEDICINE

## 2021-04-15 PROCEDURE — 85025 COMPLETE CBC W/AUTO DIFF WBC: CPT | Performed by: INTERNAL MEDICINE

## 2021-04-15 PROCEDURE — 96376 TX/PRO/DX INJ SAME DRUG ADON: CPT

## 2021-04-15 PROCEDURE — G0378 HOSPITAL OBSERVATION PER HR: HCPCS

## 2021-04-15 PROCEDURE — 83735 ASSAY OF MAGNESIUM: CPT | Performed by: INTERNAL MEDICINE

## 2021-04-15 RX ORDER — PREDNISONE 10 MG/1
20 TABLET ORAL 2 TIMES DAILY
Qty: 40 TABLET | Refills: 0 | Status: SHIPPED | OUTPATIENT
Start: 2021-04-15 | End: 2021-04-28

## 2021-04-15 RX ADMIN — PANTOPRAZOLE SODIUM 40 MG: 40 TABLET, DELAYED RELEASE ORAL at 08:27

## 2021-04-15 RX ADMIN — LACTULOSE 20 G: 20 SOLUTION ORAL at 08:27

## 2021-04-15 RX ADMIN — Medication 1 EACH: at 08:28

## 2021-04-15 RX ADMIN — METHYLPREDNISOLONE SODIUM SUCCINATE 20 MG: 40 INJECTION, POWDER, FOR SOLUTION INTRAMUSCULAR; INTRAVENOUS at 09:22

## 2021-04-15 RX ADMIN — SODIUM CHLORIDE, PRESERVATIVE FREE 10 ML: 5 INJECTION INTRAVENOUS at 08:28

## 2021-04-15 RX ADMIN — MESALAMINE 1.5 G: 0.38 CAPSULE, EXTENDED RELEASE ORAL at 08:27

## 2021-04-15 RX ADMIN — Medication 1000 MCG: at 08:27

## 2021-04-15 NOTE — TELEPHONE ENCOUNTER
Caller: GAEL    Relationship to patient: NURSE  Best call back number: [phone]    New or established patient?  [x] New  [] Established    Date of discharge: 04/15/2021-PER GAEL    Facility discharged from: [facility]    Diagnosis/Symptoms: WEAKNESS    Length of stay (If applicable): 04/08/2021-04/15/2021-7 DAYS    Specialty Only: Did you see a [facility] health provider?    [x] Yes  [] No  If so, who? MYESHA ROMERO    PLEASE ADVISE, NEEDS TO BE SEEN WITHIN ONE WEEK.

## 2021-04-15 NOTE — PLAN OF CARE
Goal Outcome Evaluation:         VSS, A&Ox4, no c/o pain, swelling and erythema at R hand knuckles, ivf, iv steriod, neuro onc following, possible d/c pending s/o of providers, ctm

## 2021-04-15 NOTE — OUTREACH NOTE
Prep Survey      Responses   [facility] facility patient discharged from?  [facility]   Is LACE score < 7 ?  No   Emergency Room discharge w/ pulse ox?  No   Eligibility  [facility]   Date of Admission  04/07/21   Date of Discharge  04/15/21   Discharge Disposition  Home-Health Care Sv   Discharge diagnosis  Acute UTI Dysautonomia MGUS Immobility   Does the patient have one of the following disease processes/diagnoses(primary or secondary)?  Other   Does the patient have Home health ordered?  Yes   What is the Home health agency?   [facility]    Is there a DME ordered?  No   Prep survey completed?  Yes          Janice Mena RN

## 2021-04-15 NOTE — PROGRESS NOTES
Case Management Discharge Note      Final Note: DC home with [facility] via [facility]. Katherine Meek RN         Selected Continued Care - Discharged on 4/15/2021 Admission date: 4/7/2021 - Discharge disposition: Home-Health Care Svc    Destination    No services have been selected for the patient.              Durable Medical Equipment    No services have been selected for the patient.              Dialysis/Infusion    No services have been selected for the patient.              Home Medical Care Coordination complete    Service Provider Selected Services Address Phone Fax Patient Preferred    [facility] - [facility] [address] [place] [phone] --          Therapy    No services have been selected for the patient.              Community Resources    No services have been selected for the patient.                  Transportation Services  Ambulance: [facility]    Final Discharge Disposition Code: 06 - home with home health care

## 2021-04-15 NOTE — DISCHARGE SUMMARY
Patient Name: Brittany Villeda  : 1935  MRN: [id number]    Date of Admission: 2021  Date of Discharge:  4/15/2021  Primary Care Physician: Mega Esparza MD      Discharge Diagnoses     Active Hospital Problems    Diagnosis  POA   • **Generalized weakness [R53.1]  Yes   • Arthritis of right wrist [M19.031]  No   • Macrocytosis [D75.89]  Yes   • Dysautonomia (CMS/HCC) [G90.1]  Yes   • Weakness of both lower extremities [R29.898]  Yes   • Chronic anticoagulation [Z79.01]  Not Applicable   • Immobility [Z74.09]  Yes   • Obesity (BMI 30-39.9) [E66.9]  Yes   • Atrial fibrillation, persistent (CMS/HCC) [I48.19]  Yes   • 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]  Yes   • MGUS (monoclonal gammopathy of unknown significance) [D47.2]  Yes   • HUGO (obstructive sleep apnea) [G47.33]  Yes   • Pulmonary hypertension (CMS/HCC) [I27.20]  Yes   • Diastolic dysfunction [I51.89]  Yes   • Essential hypertension [I10]  Yes      Resolved Hospital Problems    Diagnosis Date Resolved POA   • Hypomagnesemia [E83.42] 04/15/2021 No   • Hypercalcemia [E83.52] 04/15/2021 No   • Spondylosis of lumbosacral region without myelopathy or radiculopathy [M47.817] 2021 Yes   • Acute UTI (urinary tract infection) [N39.0] 04/15/2021 Yes        Hospital Course     Brief admission history and physical.  Please refer to the H&P for full details.  A very pleasant 86 years old -American female with a past history of ulcerative colitis/MGUS/pulmonary hypertension/motility/obstructive sleep apnea/obesity/A. fib/diastolic dysfunction/hypertension.  Presented to the emergency department with progressive worsening weakness and mobility associated with shortness of breath does report multiple joint pains specially in both lower extremities without redness hotness.  She does report that there weakness of the muscle has rendered her bedridden.  No cardiac symptoms.  No fever or chills.  Positive myalgia.  No  skin rashes.  Physical examination remarkable for temperature of 97.7 a pulse of 70 respiratory rate of 156/85 and O2 sats of 100% on 4 L the rest of the examination is remarkable for obesity/rate controlled irregular heart rhythm/trace bilateral lower extremity edema/proximal muscle weakness of the upper and lower extremities.  Hospital course.  EKG revealed a normal sinus rhythm with ventricular pacing.  Chest x-ray with no acute findings.  CT scan of the brain without contrast revealed no acute intracranial disease.  CBC was normal except a white count of 3.01.  Procalcitonin was normal.  BMP was normal except a chloride of 112 CO2 of 21.  UA suggestive of UTI.  proBNP was normal.  Troponin was negative.  Liver function test was normal except an albumin of 3.3.  INR was 2.1.  The impression this nice lady was that of progressive proximal muscle weakness leading to immobility.  Extensive work-up was initiated.  Neurology was consulted.  MRIs of the C, T, L spine was significant for a C3-4 severe spinal stenosis but no cord abnormalities.  CK was mildly elevated but not significant enough to incriminate myopathy.  Potassium was normal.  TSH was normal.  Magnesium was low and this was substituted.  Ionized calcium was elevated and this was treated with Zometa.  There was no electrolyte abnormalities to incriminate for her weakness after correction of the low magnesium and high calcium.  Neurology has asked for peripheral neuropathy work-up.  The following are pending at the time of discharge including cryoglobulins/lupus anticoagulant/serum electrophoresis/heavy metal screen.  The rest of the peripheral neuropathy work-up included a normal folic acid/cortisol/TSH/B12.  Uric acid was normal.  The differential diagnosis was then narrowed to myopathy or peripheral neuropathy or CNS demyelinating disease.  EMG was scheduled and it will be done as an outpatient.  An MRI of the brain and a muscle biopsy as an outpatient  are consideration.  X-rays of the hip and the shoulder shows minimal arthritis.  She did experience redness and hotness of the right wrist associated with pain and decreased range of movement.  We gave her a low IV dose of steroid the day before discharge and she reported resolution of her myalgia and some improvement of the muscle weakness and improvement of her right wrist pain.  She will be switched to a 20 days therapy of prednisone p.o.  She will need to follow-up with neurology and rheumatology as an outpatient.  Wagoner mentioning that she had hypomagnesemia and this was corrected.  Her ionized calcium was elevated and this was corrected by Zometa and IV fluid.  Her parathyroid hormone was elevated indicating hyperparathyroidism.  Vitamin D was normal.  She will be referred to an endocrine as an outpatient.  She was noted to have an E. coli UTI and she was treated with doxycycline.  She remained asymptomatic.  She has a history of atrial fibrillation/chronic diastolic congestive heart failure/hypertension/pulmonary hypertension.  Her BN P was normal.  Her A. fib rate was controlled.  Blood pressure was controlled.  She was maintained on her Coumadin with a follow-up on the INR that remained therapeutic to slightly subtherapeutic.  A 2D echo showed a normal ejection fraction with right atrial and left atrial dilatation and diastolic dysfunction.  Cardiology saw the patient and gave the okay for the MRIs as the patient has a pacer.  She has a history of ulcerative colitis we continued her on the Protonix and mesalamine and this remained stable with benign GI examination.  She has a history of obstructive sleep apnea and she was supplemented with nocturnal oxygen.  She was noted to have mild intermittent hyperbilirubinemia suggestive of Gilbert's disease.  She had microcytosis during hospitalization B12/folic acid/cortisone/TSH were normal.  She had constipation that has resolved with lactulose and Colace.   Patient wants to go home which I do not think this would be a problem she has refused to skilled facility placement for physical therapy.  She does have a home health and physical therapy at home that follows up on her INR and on her gait training.  Patient will have multiple consultants follow-up including neurology/cardiology/endocrine/rheumatology/primary care physician.  At the time of discharge she was hemodynamically stable still continued with proximal muscle weakness and inability to ambulate      Consultants     Consult Orders (all) (From admission, onward)     Start     Ordered    04/14/21 1117  Inpatient Neuro-Oncology Consult  Once     Provider:  (Not yet assigned)    04/14/21 1117    04/12/21 1006  Inpatient Case Management  Consult  Once     Provider:  (Not yet assigned)    04/12/21 1006    04/10/21 1245  Inpatient Cardiology Consult  Once     Specialty:  Cardiology  Provider:  Lucila Shannon MD    04/10/21 1245    04/09/21 0824  Inpatient Neurology Consult General  Once     Specialty:  Neurology  Provider:  Jp Daniels MD    04/09/21 0823    04/08/21 1408  Inpatient Neurology Consult General  Once     Specialty:  Neurology  Provider:  Jp Daniels MD    04/08/21 1407    04/08/21 1223  Inpatient Spiritual Care Consult  Once     Provider:  (Not yet assigned)    04/08/21 1222    04/08/21 1007  Inpatient Case Management  Consult  Once     Provider:  (Not yet assigned)    04/08/21 1007    04/08/21 0047  [facility] (on-call MD unless specified) Details  Once     Specialty:  Hospitalist  Provider:  (Not yet assigned)    04/08/21 0046              Procedures     Imaging Results (All)     Procedure Component Value Units Date/Time    XR Shoulder 2+ View Bilateral [[id number]] Collected: 04/14/21 1249     Updated: 04/14/21 1255    Narrative:      PROCEDURE:  XR SHOULDER 2+ VW BILATERAL-     HISTORY: Rotator cuff pain in both shoulders.     COMPARISON: CT chest 04/12/2021. Chest  radiograph 04/07/2021.     FINDINGS:       6 views of the shoulders were obtained.     There is diffuse osseous demineralization, which limits evaluation of  fine osseous detail. There is bilateral glenohumeral osteoarthritis,  left greater than right. There is bilateral acromioclavicular  osteoarthritis. No acute fracture or osseous malalignment is identified.  There is partially imaged left chest cardiac pacemaker.       Impression:         Diffuse osseous demineralization with no definite acute osseous  abnormality. Bilateral glenohumeral and acromioclavicular  osteoarthritis.        This report was finalized on 4/14/2021 12:52 PM by Dr. Shasha Pitt M.D.       XR Hips Bilateral With or Without Pelvis 2 View [[id number]] Collected: 04/14/21 1244     Updated: 04/14/21 1252    Narrative:      PROCEDURE:  XR HIPS BILATERAL W OR WO PELVIS 2 VIEW-     HISTORY: Bilateral hip pain with abduction.     COMPARISON: CT abdomen and pelvis 04/12/2021.     FINDINGS:       6 views of the pelvis and both hips were obtained.     There is diffuse osseous demineralization, which limits evaluation of  fine osseous detail. There is incompletely assessed surgical hardware in  the lower lumbar spine. No definite acute fracture is identified. There  is no osseous malalignment.  There is mild bilateral hip osteoarthritis.   There is calcific atherosclerosis. There is a moderate rectal stool  burden.       Impression:         Diffuse osseous demineralization with no definite acute osseous  abnormality. If there is persistent clinical concern for acute fracture  or inability to bear weight, further evaluation with dedicated cross  sectional imaging would be recommended.        This report was finalized on 4/14/2021 12:49 PM by Dr. Shasha Pitt M.D.       XR Wrist 3+ View Right [[id number]] Collected: 04/13/21 1811     Updated: 04/13/21 1823    Narrative:      RIGHT WRIST X-RAY     HISTORY: Wrist pain after the hand was squeezed.        TECHNIQUE: Four x-rays of the right hand are provided.     FINDINGS: The bones are demineralized. There is osteoarthritic change  along the radial side of the wrist. There is no evidence of fracture.       Impression:      No acute abnormality identified at the right wrist.     This report was finalized on 4/13/2021 6:20 PM by Dr. Jp Jackson M.D.       MRI Lumbar Spine With & Without Contrast [[id number]] Collected: 04/13/21 1714     Updated: 04/13/21 1718    Narrative:      LUMBAR SPINE MRI WITH AND WITHOUT CONTRAST     HISTORY: Bilateral lower extremity weakness with difficulty standing.  Prior lumbar spine surgery.     TECHNIQUE: Lumbar MRI was performed before and after the IV  administration of 20 mL of MultiHance contrast. Cervical and thoracic  spine MRI scans were performed today as well and are reported  separately. This exam is correlated with abdomen and pelvis CT performed  yesterday.     FINDINGS: There has been previous L4 laminectomy and there appears to be  solid ankylosis across posterior elements at L4-5. There is posterior  mojgan and pedicle screw fusion hardware remaining along the left side at  that level. This level is fused with 7 mm anterolisthesis of 4 on 5.  There is 4 mm anterolisthesis of L3 on L4. Alignment is otherwise  normal. The tip of the conus lies at the lower L1 endplate level.     Vertebral body height and marrow signal are normal throughout the lumbar  spine.     There is less motion artifact on the lumbar series, perhaps as it was  the first of the three scans. Patients generally do not tolerate three  MRI scans contiguously without substantial motion on the later scans.     At L1-2 and L2-3, disc spaces are preserved and the canal and foramina  are patent.     At L3-4, there is complete loss of disc height with hypertrophic facet  change. The axial T2 sequences through this level are nondiagnostic due  to motion. There appears to be narrowing of the left lateral recess  with  only mild stenosis of the spinal canal and the neural foramina.     At L4-5, there is postsurgical change as delineated above. The canal and  foramina are patent.     At L5-S1, disc height is preserved and there is hypertrophic facet  change. The canal is patent. The foramina are mildly stenotic.     There is no abnormal contrast enhancement. Paraspinous musculature is  atrophic.       Impression:      Postoperative change from L4-5 fusion with L4 laminectomy.  There is mild lateral recess narrowing and foraminal stenosis at the  transitional L3-4 level. No high-grade stenosis is present.     This report was finalized on 4/13/2021 5:15 PM by Dr. Jp Jackson M.D.       MRI Thoracic Spine Without Contrast [[id number]] Collected: 04/13/21 1703     Updated: 04/13/21 1718    Narrative:      THORACIC SPINE MRI WITHOUT CONTRAST     HISTORY: Bilateral lower extremity weakness with difficulty standing.  Total spine MRI performed today. Cervical and lumbar exams are reported  separately.     TECHNIQUE: Thoracic spine MRI was performed without contrast and is  correlated with chest CT performed yesterday. There is excessive patient  motion on this exam which severely compromises fine anatomic detail.  However, there is yield regarding the spinal canal and the spinal cord  and so I am proceeding with the report to avoid delay in the patient's  care.     FINDINGS: There is pronounced thoracic kyphosis. Marrow signal  throughout the thoracic spine appears normal. The spinal cord appears  normal in caliber throughout its length and grossly normal in signal  throughout its length. It is possible that a small spinal cord lesion  might not be demonstrated on this exam but there is no large area of  signal or contour abnormality in the cord. The thoracic spinal canal  appears widely patent at every level. There is degenerative disc change  throughout the thoracic spine but no marrow edema or significant  compression  deformity.     Paraspinous musculature is deconditioned. No paraspinal mass lesion is  present. Visualized posterior mediastinal structures are normal and  there is no pleural effusion.       Impression:      Fine anatomic detail is limited due to motion. There is no  spinal canal stenosis or cord signal abnormality identified in the  thoracic spine.     This report was finalized on 4/13/2021 5:15 PM by Dr. Jp Jackson M.D.       MRI Cervical Spine Without Contrast [[id number]] Collected: 04/13/21 1646     Updated: 04/13/21 1659    Narrative:      CERVICAL SPINE MRI WITHOUT CONTRAST     HISTORY: Unable to stand. Bilateral lower extremity weakness.     TECHNIQUE: Cervical spine MRI was performed without contrast. Image  quality is poor due to extensive patient motion. To avoid delay in the  patient's care, I am proceeding with the report. Thoracic and lumbar MRI  scans were performed today as well.     FINDINGS: The visualized posterior fossa contents appear normal.  Vertebral body height and marrow signal are normal. There is no  subluxation.     The spinal cord demonstrates normal caliber and signal.     At C3-4, there is loss of disc height with what appears to be posterior  midline disc bulge and hypertrophic change of the posterior elements  combining to create severe stenosis of the spinal canal. There is no CSF  signal anterior or posterior to the cord at this level but only minimal  cord remodeling and no cord signal abnormality identified.     There are degenerative disc changes at C4-5, C5-6, C6-7, and C7-T1, but  there does not appear to be significant canal stenosis at any of those  levels. There is multilevel facet arthropathy and foraminal stenosis,  poorly delineated.     There appears to be massive enlargement of the thyroid gland with  multiple cystic foci.       Impression:      Severe spinal canal stenosis due to chronic-appearing  degenerative change at C3-4. There is no cord signal  abnormality. Note  that fine anatomic detail is markedly limited and detail of foraminal  stenosis is poor.     This report was finalized on 4/13/2021 4:56 PM by Dr. Jp Jackson M.D.       CT Abdomen Pelvis Without Contrast [[id number]] Collected: 04/12/21 1642     Updated: 04/13/21 1045    Narrative:      CT OF THE CHEST, ABDOMEN AND PELVIS WITHOUT CONTRAST 04/12/2021     HISTORY: Hypercalcemia. Evaluate for neoplasm.     TECHNIQUE: Spiral images were obtained from the lung apices to the  symphysis pubis. No intravenous or oral contrast was given.     COMPARISON: Previous CT of the chest dated 08/26/2020 and CT of the  abdomen and pelvis dated 11/20/2019 are compared.     FINDINGS: Again seen is rather marked enlargement of the thyroid gland  which appears similar to the 08/26/2020 study. Small amount of  mediastinal lymphadenopathy is seen and this also appears stable from  the previous study. There is some aortic and coronary calcification.     There is some minimal ground-glass appearing infiltrate in the lingula  on images 50 through 57. There is some mild chronic atelectasis or scar  of the right lower lobe. This appears similar to the 08/26/2020 study.     No new lung masses are seen.     The gallbladder has been removed. Liver appears somewhat small but no  focal hepatic lesions are seen. The spleen, pancreas and adrenals appear  unremarkable. Low-density bilateral renal lesions are seen presumably  cysts on this unenhanced scan of these were also present on the previous  study of 08/26/2020. Lower lumbar fusion hardware is seen.     There is colonic diverticulosis. Moderately large amount of stool is  seen in the rectum which is distended up to approximately 7.4 cm. Uterus  has been removed.       Impression:      1. Markedly enlarged thyroid gland which was also seen on the previous  study of 08/26/2020.  2. No definite evidence of neoplasm in the chest.  3. Status post hysterectomy and  cholecystectomy.  4. Bilateral renal cysts.  5. No evidence of neoplasm in the abdomen and pelvis.  6. There is some minimal ground glass appearing infiltrate in the left  upper lobe which may be inflammatory in nature. This was not seen on the  08/26/2020 study. Mild scar or chronic atelectasis of the right lower  lobe is seen.                 Radiation dose reduction techniques were utilized, including automated  exposure control and exposure modulation based on body size.     This report was finalized on 4/13/2021 10:42 AM by Dr. Gordy Nance M.D.       CT Chest Without Contrast Diagnostic [[id number]] Collected: 04/12/21 1642     Updated: 04/13/21 1045    Narrative:      CT OF THE CHEST, ABDOMEN AND PELVIS WITHOUT CONTRAST 04/12/2021     HISTORY: Hypercalcemia. Evaluate for neoplasm.     TECHNIQUE: Spiral images were obtained from the lung apices to the  symphysis pubis. No intravenous or oral contrast was given.     COMPARISON: Previous CT of the chest dated 08/26/2020 and CT of the  abdomen and pelvis dated 11/20/2019 are compared.     FINDINGS: Again seen is rather marked enlargement of the thyroid gland  which appears similar to the 08/26/2020 study. Small amount of  mediastinal lymphadenopathy is seen and this also appears stable from  the previous study. There is some aortic and coronary calcification.     There is some minimal ground-glass appearing infiltrate in the lingula  on images 50 through 57. There is some mild chronic atelectasis or scar  of the right lower lobe. This appears similar to the 08/26/2020 study.     No new lung masses are seen.     The gallbladder has been removed. Liver appears somewhat small but no  focal hepatic lesions are seen. The spleen, pancreas and adrenals appear  unremarkable. Low-density bilateral renal lesions are seen presumably  cysts on this unenhanced scan of these were also present on the previous  study of 08/26/2020. Lower lumbar fusion hardware is seen.        There is colonic diverticulosis. Moderately large amount of stool is  seen in the rectum which is distended up to approximately 7.4 cm. Uterus  has been removed.       Impression:      1. Markedly enlarged thyroid gland which was also seen on the previous  study of 08/26/2020.  2. No definite evidence of neoplasm in the chest.  3. Status post hysterectomy and cholecystectomy.  4. Bilateral renal cysts.  5. No evidence of neoplasm in the abdomen and pelvis.  6. There is some minimal ground glass appearing infiltrate in the left  upper lobe which may be inflammatory in nature. This was not seen on the  08/26/2020 study. Mild scar or chronic atelectasis of the right lower  lobe is seen.                 Radiation dose reduction techniques were utilized, including automated  exposure control and exposure modulation based on body size.     This report was finalized on 4/13/2021 10:42 AM by Dr. Gordy Nance M.D.       CT Head Without Contrast [[id number]] Collected: 04/08/21 0331     Updated: 04/08/21 0337    Narrative:      CT HEAD WITHOUT CONTRAST     HISTORY: Weakness     COMPARISON: 01/26/2020     TECHNIQUE: Axial CT imaging was obtained through the brain. No IV  contrast was administered.     FINDINGS:  No acute intracranial hemorrhage is seen. There is mild atrophy. There  is periventricular and deep white matter microangiopathic change. There  is no midline shift or mass effect.     Bilateral basal ganglia calcifications are seen. The paranasal sinuses  and mastoid air cells appear clear.       Impression:      No acute intracranial findings.     Radiation dose reduction techniques were utilized, including automated  exposure control and exposure modulation based on body size.     This report was finalized on 4/8/2021 3:34 AM by Dr. Luna Oleary M.D.       XR Chest 1 View [[id number]] Collected: 04/08/21 0011     Updated: 04/08/21 0015    Narrative:      SINGLE VIEW OF THE CHEST     HISTORY: Shortness of  air     COMPARISON: 11/27/2020     FINDINGS:  Cardiomegaly is present. There is no vascular congestion. There is  stable elevation of the right hemidiaphragm. Left-sided pacemaker is  present. No pneumothorax or pleural effusion is seen. No acute  infiltrates are identified.       Impression:      No acute findings.     This report was finalized on 4/8/2021 12:12 AM by Dr. Luna Oleary M.D.             Pertinent Labs     Results from last 7 days   Lab Units 04/15/21  0558 04/14/21  0701 04/12/21  0645 04/11/21  0431   WBC 10*3/mm3 4.59 4.68 4.40 4.85   HEMOGLOBIN g/dL 12.3 12.0 12.9 13.2   PLATELETS 10*3/mm3 183 170 165 146     Results from last 7 days   Lab Units 04/15/21  0558 04/14/21  0701 04/12/21  0645 04/11/21  0431   SODIUM mmol/L 137 135* 137 138   POTASSIUM mmol/L 4.1 3.7 4.2 4.1   CHLORIDE mmol/L 107 105 105 102   CO2 mmol/L 20.3* 22.0 23.8 24.3   BUN mg/dL 8 7* 12 11   CREATININE mg/dL 0.44* 0.48* 0.55* 0.55*   GLUCOSE mg/dL 143* 90 95 86   Estimated Creatinine Clearance: 57.2 mL/min (A) (by C-G formula based on SCr of 0.44 mg/dL (L)).  Results from last 7 days   Lab Units 04/15/21  0558 04/14/21  0701 04/13/21  0552 04/12/21  0645 04/11/21  0431   ALBUMIN g/dL 3.10* 2.90* 2.9 3.50 3.20*   BILIRUBIN mg/dL 1.1 2.1*  --  1.9* 1.9*   ALK PHOS U/L 89 81  --  80 77   AST (SGOT) U/L 37* 24  --  17 22   ALT (SGPT) U/L 16 15  --  11 8     Results from last 7 days   Lab Units 04/15/21  0558 04/14/21  0701 04/13/21  0552 04/12/21  1031 04/12/21  0645 04/11/21  1247   CALCIUM mg/dL 8.6 9.3  --  10.0 10.0  --    ALBUMIN g/dL 3.10* 2.90* 2.9  --  3.50  --    MAGNESIUM mg/dL 2.6* 1.5*  --  1.5*  --  1.6       Results from last 7 days   Lab Units 04/13/21  0552 04/11/21  1247 04/10/21  0642   CK TOTAL U/L 194* 211* 194*     Results from last 7 days   Lab Units 04/13/21  1222   URIC ACID mg/dL 4.9         Invalid input(s): LDLCALC      Imaging Results (Last 24 Hours)     ** No results found for the last 24 hours.  **          Test Results Pending at Discharge     Pending Labs     Order Current Status    [facility] Comprehensive Panel In process    Cryoglobulin In process    Heavy Metals, Blood In process    Lupus Anticoagulant Panel In process    Vitamin E In process            Discharge Exam   Physical Exam  Vitals temperature 97.6 a pulse of 16 and blood pressure 126/64 and O2 sats of 95% on room air  General.  Elderly female.  Alert oriented x3 no apparent pain distress or diaphoresis normal mood and affect  Eyes.  Pupils equal round and reactive intact extraocular musculature no pallor or jaundice  Neck.  Supple no JVD no lymphadenopathy no thyromegaly  Cardiovascular.  Regular rate and rhythm with occasional ectopic beats.  Grade 2 systolic murmur.  Chest.  Clear to auscultation bilaterally with no added sounds  Abdomen.  Soft lax no tenderness no organomegaly no guarding or  Extremities.  No clubbing cyanosis or edema  CNS.  Proximal muscle weakness of both lower extremities and both upper extremities more so on the lower extremities than the upper(mild improvement from yesterday) otherwise negative  Musculoskeletal.  Decreased Redness/hotness/tenderness of the right wrist with decreased range of movement.     Discharge Details        Discharge Medications      New Medications      Instructions Start Date   predniSONE 10 MG tablet  Commonly known as: DELTASONE   20 mg, Oral, 2 Times Daily         Continue These Medications      Instructions Start Date   acetaminophen 500 MG tablet  Commonly known as: TYLENOL   500 mg, Oral, Every 6 Hours PRN      albuterol sulfate  (90 Base) MCG/ACT inhaler  Commonly known as: PROVENTIL HFA;VENTOLIN HFA;PROAIR HFA   2 puffs, Inhalation, Every 6 Hours PRN      BENEFIBER DRINK MIX PO   Oral, Every 2 Hours PRN      docusate sodium 100 MG capsule  Commonly known as: COLACE   100 mg, Oral, Nightly      mesalamine 0.375 g 24 hr capsule  Commonly known as: APRISO   TAKE 4 CAPSULES DAILY         omeprazole 20 MG capsule  Commonly known as: priLOSEC   TAKE 1 CAPSULE EVERY DAY      vitamin B-12 1000 MCG tablet  Commonly known as: CYANOCOBALAMIN   1,000 mcg, Oral, Daily      warfarin 1 MG tablet  Commonly known as: COUMADIN   5 mg, Oral, Daily Warfarin         Stop These Medications    calcium carbonate-cholecalciferol 500-400 MG-UNIT tablet tablet            Allergies   Allergen Reactions   • Codeine Hallucinations   • Amitriptyline Rash   • Amoxicillin-Pot Clavulanate Rash   • Aspirin Unknown - Low Severity     Patient doesn't know why   • Bactrim [Sulfamethoxazole-Trimethoprim] Rash   • Carisoprodol-Aspirin-Codeine Palpitations   • Iodinated Diagnostic Agents Rash   • Latex Rash   • Naproxen Rash   • Nsaids Unknown - Low Severity     unknown   • Soma Compound With Codeine [Carisoprodol-Aspirin-Codeine] Rash   • Sulfa Antibiotics Rash   • Tramadol Palpitations     heart races          Discharge Disposition:  Condition: Stable    Diet:   Diet Order   Procedures   • Diet Regular; Cardiac       Activity:   Activity Instructions     Activity as Tolerated      Other Activity Instructions      Activity Instructions: Home health PT/OT to evaluate and treat    Up WIth Assist            Counseling : Continue home health PT and INR check by home    CODE STATUS:    Code Status and Medical Interventions:   Ordered at: 04/08/21 1211     Limited Support to NOT Include:    Cardioversion/Defibrillation    Intubation     Level Of Support Discussed With:    Patient     Code Status:    No CPR     Medical Interventions (Level of Support Prior to Arrest):    Limited       Future Appointments   Date Time Provider Department Center   7/19/2021 10:00 AM Mary Grace Culp MD [facility] None     Additional Instructions for the Follow-ups that You Need to Schedule     Call MD With Problems / Concerns   As directed      Instructions: Call MD or return to ER if increasing pain in the muscles or progressive worsening weakness of the  muscles/headache/focal neurological symptoms/chest pain/shortness of breath/palpitation/loss of consciousness or dizziness    Order Comments: Instructions: Call MD or return to ER if increasing pain in the muscles or progressive worsening weakness of the muscles/headache/focal neurological symptoms/chest pain/shortness of breath/palpitation/loss of consciousness or dizziness          Discharge Follow-up with PCP   As directed       Currently Documented PCP:    Mega Esparza MD    PCP Phone Number:    [phone]     Follow Up Details: 1 week.  Proximal girdle muscle weakness/mobility/anticoagulation         Discharge Follow-up with Specialty: Cardiology; 2 Weeks   As directed      Specialty: Cardiology    Follow Up: 2 Weeks    Follow Up Details: History of A. fib         Discharge Follow-up with Specified Provider: Endocrine; 2 Weeks   As directed      To: Endocrine    Follow Up: 2 Weeks    Follow Up Details: Hyperparathyroidism/hypercalcemia         Discharge Follow-up with Specified Provider: Neurology; 1 Week   As directed      To: Neurology    Follow Up: 1 Week    Follow Up Details: Proximal muscle weakness         Discharge Follow-up with Specified Provider: Rheumatology; 2 Weeks   As directed      To: Rheumatology    Follow Up: 2 Weeks    Follow Up Details: Myopathy/arthritis           Follow-up Information     Mega Esparza MD .    Specialty: Family Medicine  Why: 1 week.  Proximal girdle muscle weakness/mobility/anticoagulation  Contact information:  [address]  [place] [address]  [phone]                     Time Spent on Discharge:  Greater than 30 minutes      Cedric Cee MD  [facility]  04/15/21  09:29 EDT

## 2021-04-15 NOTE — SIGNIFICANT NOTE
"   04/15/21 0956   OTHER   Discipline occupational therapist   Rehab Time/Intention   Session Not Performed other (see comments)  (Pt adamantly refused. Voiced she is \"very weak\". OT educated pt on increasing strength before d/c and she asked therapy to s/o.)     "

## 2021-04-15 NOTE — PROGRESS NOTES
No significant neuro change overnight. Patient notes she has been d/c and ambulance is coming to get her at 1pm. EMG has not been completed yet. Will d/w Dr Arnold and reschedule as outpatient.

## 2021-04-15 NOTE — PROGRESS NOTES
Continued Stay Note  [facility]     Patient Name: Brittany Villeda  MRN: [id number]  Today's Date: 4/15/2021    Admit Date: 4/7/2021    Discharge Plan     Row Name 04/15/21 1130       Plan    Plan  home with family and [facility]    Plan Comments  DC orders in EPIC.  Spoke with patient at bedside and she confirms that she wants to return home and is not interested in SNF @ this time. She confirms that she will need an ambulance at dc and that she has a ramp to enter.  Called and spoke with Mita/[facility] and ambulance arranged for 1300.  Facesheet and ambulance necessity form in [facility].  Faye/[facility] notifed of dc.  Spoke with daughter, Amina Diana ( [phone]), by telephone and she is agreeable to dc plan.  She did confirm that the patient would have 24/7 care @ home.  Katherine Meek RN        Discharge Codes    No documentation.       Expected Discharge Date and Time     Expected Discharge Date Expected Discharge Time    Apr 15, 2021             Katherine Meek RN

## 2021-04-16 ENCOUNTER — TRANSITIONAL CARE MANAGEMENT TELEPHONE ENCOUNTER (OUTPATIENT)
Dept: CALL CENTER | Facility: HOSPITAL | Age: 86
End: 2021-04-16

## 2021-04-16 LAB
ARSENIC BLD-MCNC: 5 UG/L (ref 2–23)
LEAD BLDV-MCNC: 4 UG/DL (ref 0–4)
MERCURY BLD-MCNC: <1 UG/L (ref 0–14.9)

## 2021-04-16 NOTE — OUTREACH NOTE
Call Center TCM Note      Responses   [facility] patient discharged from?  [place]   Does the patient have one of the following disease processes/diagnoses(primary or secondary)?  Other   TCM attempt successful?  Yes   Call start time  1654   Call end time  1657   Discharge diagnosis  Acute UTI Dysautonomia MGUS Immobility   Does the patient have all medications ordered at discharge?  Yes   Is the patient taking all medications as directed (includes completed medication regime)?  Yes   Does the patient have a primary care provider?   Yes   Does the patient have an appointment with their PCP within 7 days of discharge?  N/A   Comments regarding PCP  states her daughter Amina takes care of all of her appts   Has the patient kept scheduled appointments due by today?  N/A   What is the Home health agency?   [facility]    Has home health visited the patient within 72 hours of discharge?  Yes   Psychosocial issues?  No   Did the patient receive a copy of their discharge instructions?  Yes   Nursing interventions  Reviewed instructions with patient   What is the patient's perception of their health status since discharge?  Improving   Is the patient/caregiver able to teach back the hierarchy of who to call/visit for symptoms/problems? PCP, Specialist, Home health nurse, Urgent Care, ED, 911  Yes   TCM call completed?  Yes   Wrap up additional comments  States she is doing fine, stated her daughter takes care of her appts and she will be calling to make her f/u appts, no questions or concerns at this time.          Radha Falcon RN    4/16/2021, 16:57 EDT

## 2021-04-16 NOTE — OUTREACH NOTE
Call Center TCM Note      Responses   [facility] patient discharged from?  [place]   Does the patient have one of the following disease processes/diagnoses(primary or secondary)?  Other   TCM attempt successful?  No   Unsuccessful attempts  Attempt 1          Janice Webster RN    4/16/2021, 16:22 EDT

## 2021-04-16 NOTE — TELEPHONE ENCOUNTER
You saw this pt in the hosp this month. She is wanting to schedule a f/u for next week (d/c paperwork said 1 week). Just wanted to be sure you are okay with following up with her and when. I didn't see a plan for f/u in the last note.       Thank you

## 2021-04-19 ENCOUNTER — TELEPHONE (OUTPATIENT)
Dept: CARDIOLOGY | Facility: CLINIC | Age: 86
End: 2021-04-19

## 2021-04-19 ENCOUNTER — TELEPHONE (OUTPATIENT)
Dept: PHARMACY | Facility: HOSPITAL | Age: 86
End: 2021-04-19

## 2021-04-19 DIAGNOSIS — I48.0 PAROXYSMAL ATRIAL FIBRILLATION (HCC): Primary | ICD-10-CM

## 2021-04-19 LAB — CRYOGLOB SER QL 1D COLD INC: NORMAL

## 2021-04-19 NOTE — TELEPHONE ENCOUNTER
Patient's daughter Amina called, informing me that her mom was in [facility] April 7-15.  Patient was told to follow up in 2 weeks for an office appt.  Amina said Brittany can be reached at [phone].  Amina also said her mom might want to do a virtual appt if that is an option.

## 2021-04-19 NOTE — TELEPHONE ENCOUNTER
Ms. Villeda has visits with [facility] scheduled, with next visit being tomorrow, 4/20/21. Faxed INR order to ensure INR is checked tomorrow. Spoke with staff member at [place]'s office who confirmed.

## 2021-04-19 NOTE — TELEPHONE ENCOUNTER
Provider: MELTON  Caller: MAIRA CONCEPCION  Relationship to Patient: DAUGHTER  Pharmacy: NA  Phone Number: [phone]  Reason for Call: HOSPITAL FOLLOW UP   PATIENT IS WANTING TO GET A FOLLOW UP ON THIS REQUEST.   When was the patient last seen: 04/08/2021-04/15/2021    PLEASE ADVISE.

## 2021-04-20 ENCOUNTER — ANTICOAGULATION VISIT (OUTPATIENT)
Dept: PHARMACY | Facility: HOSPITAL | Age: 86
End: 2021-04-20

## 2021-04-20 DIAGNOSIS — I48.19 ATRIAL FIBRILLATION, PERSISTENT (HCC): Primary | ICD-10-CM

## 2021-04-20 LAB — INR PPP: 3.8

## 2021-04-20 PROCEDURE — 93294 REM INTERROG EVL PM/LDLS PM: CPT | Performed by: INTERNAL MEDICINE

## 2021-04-20 PROCEDURE — 93296 REM INTERROG EVL PM/IDS: CPT | Performed by: INTERNAL MEDICINE

## 2021-04-20 NOTE — PROGRESS NOTES
Anticoagulation Clinic Progress Note    Anticoagulation Summary  As of 4/20/2021    INR goal:  2.0-3.0   TTR:  70.2 % (2.5 y)   INR used for dosing:  3.80 (4/20/2021)   Warfarin maintenance plan:  5 mg every Mon, Thu, Sat; 2.5 mg all other days   Weekly warfarin total:  25 mg   Plan last modified:  Rusty Interiano [facility] (2/23/2021)   Next INR check:  4/26/2021   Priority:  Maintenance   Target end date:  Indefinite    Indications    Atrial fibrillation  persistent (CMS/HCC) [I48.19]             Anticoagulation Episode Summary     INR check location:      Preferred lab:      Send INR reminders to:  [facility] [place]    Comments:        Anticoagulation Care Providers     Provider Role Specialty Phone number    Mary Grace Culp MD Referring Cardiology [phone]          INR History:  Anticoagulation Monitoring 3/29/2021 4/5/2021 4/20/2021   INR 1.80 2.40 3.80   INR Date 3/29/2021 4/5/2021 4/20/2021   INR Goal 2.0-3.0 2.0-3.0 2.0-3.0   Trend Same Same Same   Last Week Total 25 mg 25 mg 25 mg   Next Week Total 25 mg 25 mg 22.5 mg   Sun 2.5 mg 2.5 mg 2.5 mg   Mon 5 mg 5 mg -   Tue 2.5 mg 2.5 mg 2.5 mg   Wed 2.5 mg 2.5 mg 2.5 mg   Thu 5 mg 5 mg 2.5 mg (4/22)   Fri 2.5 mg 2.5 mg 2.5 mg   Sat 5 mg 5 mg 5 mg   Visit Report - - -   Some recent data might be hidden       Plan:  1. INR is Supratherapeutic today- see above in Anticoagulation Summary.   Provided instructions to Maria Antonia with [facility] to  change her warfarin regimen (2.5mg on Thursday)- see above in Anticoagulation Summary.  2. Follow up INR on 4/26      Janice Hart [facility]

## 2021-04-21 LAB
A-TOCOPHEROL VIT E SERPL-MCNC: 5.7 MG/L (ref 9–29)
GAMMA TOCOPHEROL SERPL-MCNC: 1.1 MG/L (ref 0.5–4.9)

## 2021-04-22 ENCOUNTER — READMISSION MANAGEMENT (OUTPATIENT)
Dept: CALL CENTER | Facility: HOSPITAL | Age: 86
End: 2021-04-22

## 2021-04-22 NOTE — OUTREACH NOTE
Medical Week 2 Survey      Responses   [facility] patient discharged from?  [place]   Does the patient have one of the following disease processes/diagnoses(primary or secondary)?  Other   Week 2 attempt successful?  Yes   Call start time  1723   Discharge diagnosis  Acute UTI Dysautonomia MGUS Immobility   Call end time  1726   Meds reviewed with patient/caregiver?  Yes   Is the patient having any side effects they believe may be caused by any medication additions or changes?  No   Does the patient have all medications ordered at discharge?  Yes   Is the patient taking all medications as directed (includes completed medication regime)?  Yes   Does the patient have a primary care provider?   Yes   Does the patient have an appointment with their PCP within 7 days of discharge?  Yes   Has the patient kept scheduled appointments due by today?  Yes   Psychosocial issues?  No   Did the patient receive a copy of their discharge instructions?  Yes   Nursing interventions  Reviewed instructions with patient   What is the patient's perception of their health status since discharge?  Improving   Is the patient/caregiver able to teach back signs and symptoms related to disease process for when to call PCP?  Yes   Is the patient/caregiver able to teach back signs and symptoms related to disease process for when to call 911?  Yes   Is the patient/caregiver able to teach back the hierarchy of who to call/visit for symptoms/problems? PCP, Specialist, Home health nurse, Urgent Care, ED, 911  Yes   Week 2 Call Completed?  Yes          Luc Tripp RN

## 2021-04-26 ENCOUNTER — TELEMEDICINE (OUTPATIENT)
Dept: FAMILY MEDICINE CLINIC | Facility: CLINIC | Age: 86
End: 2021-04-26

## 2021-04-26 ENCOUNTER — ANTICOAGULATION VISIT (OUTPATIENT)
Dept: PHARMACY | Facility: HOSPITAL | Age: 86
End: 2021-04-26

## 2021-04-26 DIAGNOSIS — E83.52 HYPERCALCEMIA: ICD-10-CM

## 2021-04-26 DIAGNOSIS — I48.19 ATRIAL FIBRILLATION, PERSISTENT (HCC): ICD-10-CM

## 2021-04-26 DIAGNOSIS — I48.19 ATRIAL FIBRILLATION, PERSISTENT (HCC): Primary | ICD-10-CM

## 2021-04-26 DIAGNOSIS — E83.42 HYPOMAGNESEMIA: ICD-10-CM

## 2021-04-26 DIAGNOSIS — M35.3 PMR (POLYMYALGIA RHEUMATICA) (HCC): ICD-10-CM

## 2021-04-26 DIAGNOSIS — Z09 HOSPITAL DISCHARGE FOLLOW-UP: Primary | ICD-10-CM

## 2021-04-26 LAB — INR PPP: 2.7

## 2021-04-26 PROCEDURE — 99214 OFFICE O/P EST MOD 30 MIN: CPT | Performed by: FAMILY MEDICINE

## 2021-04-26 PROCEDURE — 1111F DSCHRG MED/CURRENT MED MERGE: CPT | Performed by: FAMILY MEDICINE

## 2021-04-26 NOTE — PATIENT INSTRUCTIONS
I believe you may have polymyalgia rheumatica.  I want to continue on the prednisone until we discuss it more next visit.  If you run out of prednisone between now and the next visit, please contact us.  We await the results of blood work.  I will be contacting our staff will give the visiting nurses to call.  Polymyalgia Rheumatica  Polymyalgia rheumatica (PMR) is an inflammatory disorder that causes the muscles and joints to ache and become stiff. Sometimes, PMR leads to a more dangerous condition that can cause vision loss (temporal arteritis or giant cell arteritis).  What are the causes?  The exact cause of PMR is not known.  What increases the risk?  You are more likely to develop this condition if you are:  · Female.  · 50 years of age or older.  · .  What are the signs or symptoms?  Pain and stiffness are the main symptoms of PMR. Symptoms may:  · Be worse after inactivity and in the morning.  · Affect your:  ? Hips, buttocks, and thighs.  ? Neck, arms, and shoulders. This can make it hard to raise your arms above your head.  ? Hands and wrists.  Other symptoms include:  · Fever.  · Tiredness.  · Weakness.  · Depression.  · Decreased appetite. This may lead to weight loss.  Symptoms may start slowly or suddenly.  How is this diagnosed?  This condition is diagnosed with your medical history and a physical exam. You may need to see a health care provider who specializes in diseases of the joints, muscles, and bones (rheumatologist). You may also have tests, including:  · Blood tests.  · X-rays.  · Ultrasound.  How is this treated?  PMR usually goes away without treatment, but it may take years. Your health care provider may recommend low-dose steroids and other medicines to help manage your symptoms of pain and stiffness. Regular exercise and rest will also help your symptoms.  Follow these instructions at home:    · Take over-the-counter and prescription medicines only as told by your health care  provider.  · Make sure to get enough rest and sleep.  · Eat a healthy and nutritious diet.  · Try to exercise most days of the week. Ask your health care provider what type of exercise is best for you.  · Keep all follow-up visits as told by your health care provider. This is important.  Contact a health care provider if:  · Your symptoms do not improve with medicine.  · You have side effects from steroids. These may include:  ? Weight gain.  ? Swelling.  ? Insomnia.  ? Mood changes.  ? Bruising.  ? High blood sugar readings, if you have diabetes.  ? Higher than normal blood pressure readings, if you monitor your blood pressure.  Get help right away if:  · You develop symptoms of temporal arteritis, such as:  ? A change in vision.  ? Severe headache.  ? Scalp pain.  ? Jaw pain.  Summary  · Polymyalgia rheumatica is an inflammatory disorder that causes aching and stiffness in your muscles and joints.  · The exact cause of this condition is not known.  · This condition usually goes away without treatment. Your health care provider may give you low-dose steroids to help manage your pain and stiffness.  · Rest and regular exercise will help the symptoms.  This information is not intended to replace advice given to you by your health care provider. Make sure you discuss any questions you have with your health care provider.  Document Revised: 10/24/2019 Document Reviewed: 10/24/2019  Elsevier Patient Education © 2021 [employer]

## 2021-04-26 NOTE — PROGRESS NOTES
Anticoagulation Clinic Progress Note    Anticoagulation Summary  As of 2021    INR goal:  2.0-3.0   TTR:  69.9 % (2.6 y)   INR used for dosin.70 (2021)   Warfarin maintenance plan:  5 mg every Mon, Thu, Sat; 2.5 mg all other days   Weekly warfarin total:  25 mg   Plan last modified:  Rusty Interiano RPH (2021)   Next INR check:  5/3/2021   Priority:  Maintenance   Target end date:  Indefinite    Indications    Atrial fibrillation  persistent (CMS/HCC) [I48.19]             Anticoagulation Episode Summary     INR check location:      Preferred lab:      Send INR reminders to:  [facility]    Comments:        Anticoagulation Care Providers     Provider Role Specialty Phone number    Mary Grace Culp MD Referring Cardiology [phone]        Findings:  Discharged from hospital 4/15/21 on 20-day course of prednisone.     INR History:  Anticoagulation Monitoring 2021   INR 2.40 3.80 2.70   INR Date 2021   INR Goal 2.0-3.0 2.0-3.0 2.0-3.0   Trend Same Same Same   Last Week Total 25 mg 25 mg 22.5 mg   Next Week Total 25 mg 22.5 mg 22.5 mg   Sun 2.5 mg 2.5 mg 2.5 mg   Mon 5 mg - 5 mg   Tue 2.5 mg 2.5 mg 2.5 mg   Wed 2.5 mg 2.5 mg 2.5 mg   Thu 5 mg 2.5 mg () 2.5 mg ()   Fri 2.5 mg 2.5 mg 2.5 mg   Sat 5 mg 5 mg 5 mg   Visit Report - - -   Some recent data might be hidden       Plan:  1. INR is Therapeutic today- see above in Anticoagulation Summary.   Provided instructions to Maria Antonia with [facility] to Change their warfarin regimen - Trial 5 mg Mon/Sat, 2.5 mg AOD while on prednisone - see above in Anticoagulation Summary.  2. Follow up in 1 week      Rusty Interiano RPH

## 2021-04-26 NOTE — PROGRESS NOTES
"Chief Complaint  Hospital Follow Up Visit and Fatigue    Subjective          Brittany Villeda presents to [facility]  History of Present Illness     Hospital discharge video visit.  Good audio and video connection.  Informed consent given.    Hospital discharge follow-up for leg weakness and leg pain.  She was admitted.  Extensive work-up.  Of note her sedimentation rate and C-reactive protein were moderately elevated.  Her calcium and parathyroid hormone levels were elevated.  Her parathyroid hormone level was just minimally elevated.  Her magnesium was low.  Her ionized calcium was high.  All this was corrected.  When the prednisone magnesium was started, she states her leg pain and weakness \"melted away\" and she said it was like \"pouring water on a flower\".  She felt much better.  She did describe some weakness and achiness.  She continues on prednisone 20 mg a day.  She has 20 days worth.  She continues to follow with home health.  They continue to monitor her INR for atrial fibrillation stroke prevention.  She has history of monoclonal gammopathy of unknown significance.  Her SPEP showed IgA a monoclonal gammopathy, unchanged from previous checks.  M spike just minimally elevated.  Her ALEC was positive, with RNP antibodies elevated otherwise antibodies were normal.  X-rays revealed diffuse osseous demineralization.  No other acute findings.  No lytic areas.  No fractures.    Since being home she is taking her medication.  She is not taking magnesium.  The magnesium level is elevated at discharge.  She is walking more.  She continues the prednisone.  She feels much better..    Objective   Vital Signs:   There were no vitals taken for this visit.    Physical Exam  Constitutional:       Comments: She looks much better.  Her affect is brighter.   HENT:      Head: Atraumatic.   Pulmonary:      Effort: Pulmonary effort is normal. No respiratory distress.   Musculoskeletal:      " Cervical back: Normal range of motion.   Skin:     Coloration: Skin is not pale.   Neurological:      Mental Status: She is alert and oriented to person, place, and time.      Coordination: Coordination normal.   Psychiatric:         Behavior: Behavior normal.        Result Review :   The following data was reviewed by: Mega Esparza MD on 04/26/2021:  Common labs    Common Labsle 4/13/21 4/13/21 4/13/21 4/14/21 4/14/21 4/15/21 4/15/21    0552 0552 1222 0701 0701 0558 0558   Glucose     90  143 (A)   BUN     7 (A)  8   Creatinine     0.48 (A)  0.44 (A)   eGFR  Am     149  >150   Sodium     135 (A)  137   Potassium     3.7  4.1   Chloride     105  107   Calcium     9.3  8.6   Total Protein  6.5        Albumin  2.9   2.90 (A)  3.10 (A)   Total Bilirubin     2.1 (A)  1.1   Alkaline Phosphatase     81  89   AST (SGOT)     24  37 (A)   ALT (SGPT)     15  16   WBC    4.68  4.59    Hemoglobin    12.0  12.3    Hematocrit    35.8  36.2    Platelets    170  183    Hemoglobin A1C 4.60 (A)         Uric Acid   4.9       (A) Abnormal value            Data reviewed: Consultant notes Pulm and Recent hospitalization notes D/C summary and labs          Assessment and Plan    Diagnoses and all orders for this visit:    1. Hospital discharge follow-up (Primary)    2. PMR (polymyalgia rheumatica) (CMS/[facility])  -     Comprehensive Metabolic Panel; Future  -     Sedimentation Rate; Future  -     C-reactive Protein; Future    3. Hypomagnesemia  -     Magnesium; Future  -     PTH Intact (Serial Monitor); Future  -     Calcium, Ionized; Future    4. Hypercalcemia  -     Comprehensive Metabolic Panel; Future  -     Magnesium; Future  -     PTH Intact (Serial Monitor); Future  -     Calcium, Ionized; Future    5. Atrial fibrillation, persistent (CMS/[facility])      Hospital discharge follow-up for leg weakness and pain.  Hypomagnesemia, resolved.  Hypercalcemia with mildly elevated parathyroid hormone level.  She is no longer taking calcium  supplementation.    I am repeating the above lab work.    Given the rapid improvement in symptomatology, decreased pain and decreased weakness, with the prednisone at low-dose, this is suggestive of polymyalgia rheumatica and is supported by the elevated CRP and sed rate.  At this time I recommend continuing the low-dose prednisone 20 mg a day.  I am repeating the above blood work.  To consider then lowering down to 15 or 10 mg a day.  Assuming the other repeat lab work is normal.    She does have an EMG ordered.  I would recommend at this time canceling it.  She is at home and unable to get into the doctor's office.  We can order a later date if needed.    Atrial fibrillation.  Persistent.  She continues on warfarin and this is managed by her cardiologist.    I will see her back in 2 weeks for a video visit.  Sooner as needed.    Duration of visit 35 minutes.      Follow Up   No follow-ups on file.  Patient was given instructions and counseling regarding her condition or for health maintenance advice. Please see specific information pulled into the AVS if appropriate.

## 2021-04-26 NOTE — PROGRESS NOTES
Transitional Care Follow Up Visit  Subjective     Brittany Villeda is a 86 y.o. female who presents for a transitional care management visit.    Within 48 business hours after discharge our office contacted her via telephone to coordinate her care and needs.      I reviewed and discussed the details of that call along with the discharge summary, hospital problems, inpatient lab results, inpatient diagnostic studies, and consultation reports with Brittany.     Current outpatient and discharge medications have been reconciled for the patient.  Reviewed by: Mega Espraza MD      Date of TCM Phone Call 4/15/2021   [facility]   Date of Admission 4/7/2021   Date of Discharge 4/15/2021   Discharge Disposition [facility]     Risk for Readmission (LACE) Score: 14 (4/15/2021  6:01 AM)      Fatigue  Associated symptoms include fatigue.      Course During Hospital Stay:  Uncomplicated.      The following portions of the patient's history were reviewed and updated as appropriate: allergies, current medications, past family history, past medical history, past social history, past surgical history and problem list.    Review of Systems   Constitutional: Positive for fatigue.       Objective   Physical Exam    Assessment/Plan   Diagnoses and all orders for this visit:    1. Hospital discharge follow-up (Primary)    2. PMR (polymyalgia rheumatica) (CMS/[facility])  -     Comprehensive Metabolic Panel; Future  -     Sedimentation Rate; Future  -     C-reactive Protein; Future    3. Hypomagnesemia  -     Magnesium; Future  -     PTH Intact (Serial Monitor); Future  -     Calcium, Ionized; Future    4. Hypercalcemia  -     Comprehensive Metabolic Panel; Future  -     Magnesium; Future  -     PTH Intact (Serial Monitor); Future  -     Calcium, Ionized; Future    5. Atrial fibrillation, persistent (CMS/[facility])

## 2021-04-27 DIAGNOSIS — E83.42 HYPOMAGNESEMIA: ICD-10-CM

## 2021-04-27 DIAGNOSIS — M35.3 PMR (POLYMYALGIA RHEUMATICA) (HCC): ICD-10-CM

## 2021-04-27 DIAGNOSIS — E83.52 HYPERCALCEMIA: ICD-10-CM

## 2021-04-27 LAB
APTT HEX PL PPP: 0 SEC
APTT IMM NP PPP: 27.1 SEC
APTT PPP 1:1 SALINE: 54.3 SEC
APTT PPP: 38.2 SEC
B2 GLYCOPROT1 IGA SER-ACNC: <10 SAU
B2 GLYCOPROT1 IGG SER-ACNC: <10 SGU
B2 GLYCOPROT1 IGM SER-ACNC: <10 SMU
CARDIOLIPIN IGG SER IA-ACNC: <10 GPL
CARDIOLIPIN IGM SER IA-ACNC: <10 MPL
CONFIRM APTT: 0 SEC
CONFIRM DRVVT: ABNORMAL S
DRVVT SCREEN TO CONFIRM RATIO: ABNORMAL {RATIO}
INR PPP: 1.9 RATIO
LABORATORY COMMENT REPORT: ABNORMAL
PROTHROMBIN TIME: 19.4 SEC
SCREEN DRVVT: 46.6 SEC
THROMBIN TIME: 15.1 SEC

## 2021-04-28 ENCOUNTER — TELEMEDICINE (OUTPATIENT)
Dept: CARDIOLOGY | Facility: CLINIC | Age: 86
End: 2021-04-28

## 2021-04-28 VITALS
DIASTOLIC BLOOD PRESSURE: 73 MMHG | HEART RATE: 75 BPM | HEIGHT: 64 IN | BODY MASS INDEX: 36.9 KG/M2 | SYSTOLIC BLOOD PRESSURE: 149 MMHG

## 2021-04-28 DIAGNOSIS — Z79.01 CHRONIC ANTICOAGULATION: ICD-10-CM

## 2021-04-28 DIAGNOSIS — Z95.0 PRESENCE OF CARDIAC PACEMAKER: ICD-10-CM

## 2021-04-28 DIAGNOSIS — E21.3 HYPERPARATHYROIDISM (HCC): Primary | ICD-10-CM

## 2021-04-28 DIAGNOSIS — I77.810 DILATION OF THORACIC AORTA (HCC): ICD-10-CM

## 2021-04-28 DIAGNOSIS — I48.11 LONGSTANDING PERSISTENT ATRIAL FIBRILLATION (HCC): Primary | ICD-10-CM

## 2021-04-28 PROCEDURE — 99214 OFFICE O/P EST MOD 30 MIN: CPT | Performed by: NURSE PRACTITIONER

## 2021-04-28 RX ORDER — PREDNISONE 10 MG/1
10 TABLET ORAL 2 TIMES DAILY
Qty: 20 TABLET | Refills: 0 | Status: SHIPPED | OUTPATIENT
Start: 2021-04-28 | End: 2021-05-07

## 2021-05-03 ENCOUNTER — TELEMEDICINE (OUTPATIENT)
Dept: FAMILY MEDICINE CLINIC | Facility: CLINIC | Age: 86
End: 2021-05-03

## 2021-05-03 ENCOUNTER — ANTICOAGULATION VISIT (OUTPATIENT)
Dept: PHARMACY | Facility: HOSPITAL | Age: 86
End: 2021-05-03

## 2021-05-03 DIAGNOSIS — I48.19 ATRIAL FIBRILLATION, PERSISTENT (HCC): Primary | ICD-10-CM

## 2021-05-03 DIAGNOSIS — E21.3 HYPERPARATHYROIDISM (HCC): Primary | ICD-10-CM

## 2021-05-03 LAB — INR PPP: 2.7

## 2021-05-03 PROCEDURE — 99213 OFFICE O/P EST LOW 20 MIN: CPT | Performed by: FAMILY MEDICINE

## 2021-05-03 NOTE — PROGRESS NOTES
Chief Complaint  No chief complaint on file.    Subjective          Brittany Villeda presents to [facility]  History of Present Illness    You have chosen to receive care through a telehealth visit.  Do you consent to use a video/audio connection for your medical care today? Yes     FaceTime audio video visit with excellent connection.    Follow-up elevated calcium levels.  Patient was admitted to [facility] a few weeks ago with weakness and lower extremity discomfort.  She is found to have elevated CRP and sedimentation rate.  Her parathyroid hormone level was elevated in the 80s.  Her ionized calcium was elevated.  Her albumin was slightly low.  Her magnesium was very low.  She had magnesium supplementation and she started feeling better.  The same time she was placed on prednisone at low-dose for possible neuropathy.    At our last telephone visit about a week ago, I continue to her prednisone but at a lower dose of 10 mg.  I ordered lab work which is done through [facility] visiting nurses.  Her ionized calcium level is elevated at 1.36.  Magnesium was borderline low at 1.4.  Normal transaminases.  Her albumin was on the lower side of 3.5.  Her calcium level was 9.7.  Corrected to low tens.  The C-reactive protein was almost back to normal and so was a sedimentation rate.    Her repeat parathyroid hormone level is very high at 243.    She continues to feel better.  She is more ambulatory.  She still feels as well she was before with the prednisone and the magnesium was started at the hospital.  Much better than since before admission.          Objective   Vital Signs:   There were no vitals taken for this visit.    Physical Exam  HENT:      Head: Atraumatic.   Pulmonary:      Effort: Pulmonary effort is normal. No respiratory distress.   Musculoskeletal:      Cervical back: Normal range of motion.   Skin:     Coloration: Skin is not pale.   Neurological:      Mental  Status: She is alert and oriented to person, place, and time.      Coordination: Coordination normal.   Psychiatric:         Behavior: Behavior normal.        Result Review :   The following data was reviewed by: Mega Esparza MD on 05/03/2021:  Common labs    Common Labsle 4/13/21 4/13/21 4/13/21 4/14/21 4/14/21 4/15/21 4/15/21    0552 0552 1222 0701 0701 0558 0558   Glucose     90  143 (A)   BUN     7 (A)  8   Creatinine     0.48 (A)  0.44 (A)   eGFR  Am     149  >150   Sodium     135 (A)  137   Potassium     3.7  4.1   Chloride     105  107   Calcium     9.3  8.6   Total Protein  6.5        Albumin  2.9   2.90 (A)  3.10 (A)   Total Bilirubin     2.1 (A)  1.1   Alkaline Phosphatase     81  89   AST (SGOT)     24  37 (A)   ALT (SGPT)     15  16   WBC    4.68  4.59    Hemoglobin    12.0  12.3    Hematocrit    35.8  36.2    Platelets    170  183    Hemoglobin A1C 4.60 (A)         Uric Acid   4.9       (A) Abnormal value                      Assessment and Plan    Diagnoses and all orders for this visit:    1. Hyperparathyroidism (CMS/[facility]) (Primary)  -     Ambulatory Referral to Endocrinology    Other orders  -     magnesium gluconate (MAGONATE) 30 MG tablet; Take 1 tablet by mouth Daily.  Dispense: 90 tablet; Refill: 1      Lower extremity weakness and myalgias.  Improved with a combination of magnesium and prednisone.  She definitely has hyperparathyroidism.  Probably primary hyperparathyroidism.  Her vitamin D level was normal during hospitalization at 34.  There still also may be an element of polymyalgia rheumatica.  I am going to continue her on the prednisone 10 mg a day for the next couple weeks and then stop it.  In addition I want her to continue on some magnesium supplementation which I am sending to the pharmacy.  I also get a referral with an endocrinologist.  Referral has been placed.  I will follow up with her after the endocrinology consultation.  They understand to contact us if her symptoms  are getting worse.  Note she is pretty much bedbound and homebound.  Although the family states will be able to get her into the endocrinologist if available.    Duration of visit approximately 15 minutes      Follow Up   No follow-ups on file.  Patient was given instructions and counseling regarding her condition or for health maintenance advice. Please see specific information pulled into the AVS if appropriate.

## 2021-05-03 NOTE — PROGRESS NOTES
Anticoagulation Clinic Progress Note    Anticoagulation Summary  As of 5/3/2021    INR goal:  2.0-3.0   TTR:  70.0 % (2.6 y)   INR used for dosin.70 (5/3/2021)   Warfarin maintenance plan:  5 mg every Mon, Thu, Sat; 2.5 mg all other days   Weekly warfarin total:  25 mg   Plan last modified:  Rusty Interiano RPH (2021)   Next INR check:  5/10/2021   Priority:  Maintenance   Target end date:  Indefinite    Indications    Atrial fibrillation  persistent (CMS/HCC) [I48.19]             Anticoagulation Episode Summary     INR check location:      Preferred lab:      Send INR reminders to:  [facility]    Comments:        Anticoagulation Care Providers     Provider Role Specialty Phone number    Mary Grace Culp MD Referring Cardiology [phone]        Findings:  Continues on 20-day course of prednisone since 4/15/21.     INR History:  Anticoagulation Monitoring 2021 2021 5/3/2021   INR 3.80 2.70 2.70   INR Date 2021 2021 5/3/2021   INR Goal 2.0-3.0 2.0-3.0 2.0-3.0   Trend Same Same Same   Last Week Total 25 mg 22.5 mg 22.5 mg   Next Week Total 22.5 mg 22.5 mg 22.5 mg   Sun 2.5 mg 2.5 mg 2.5 mg   Mon - 5 mg 5 mg   Tue 2.5 mg 2.5 mg 2.5 mg   Wed 2.5 mg 2.5 mg 2.5 mg   Thu 2.5 mg () 2.5 mg () 2.5 mg ()   Fri 2.5 mg 2.5 mg 2.5 mg   Sat 5 mg 5 mg 5 mg   Visit Report - - -   Some recent data might be hidden       Plan:  1. INR is Therapeutic today- see above in Anticoagulation Summary.   Provided instructions to Maria Antonia Bhardwaj with [facility] to Change their warfarin regimen while still on prednisone course - 5 mg Mon/Sat, 2.5 mg AOD - see above in Anticoagulation Summary.  2. Follow up in 1 week      Rusty Interiano RPH

## 2021-05-07 ENCOUNTER — READMISSION MANAGEMENT (OUTPATIENT)
Dept: CALL CENTER | Facility: HOSPITAL | Age: 86
End: 2021-05-07

## 2021-05-07 RX ORDER — PREDNISONE 1 MG/1
15 TABLET ORAL DAILY
Qty: 90 TABLET | Refills: 1 | Status: SHIPPED | OUTPATIENT
Start: 2021-05-07 | End: 2021-05-14 | Stop reason: HOSPADM

## 2021-05-07 NOTE — OUTREACH NOTE
Medical Week 4 Survey      Responses   [facility] patient discharged from?  [place]   Does the patient have one of the following disease processes/diagnoses(primary or secondary)?  Other   Week 4 attempt successful?  Yes   Call start time  1304   Call end time  1311   Discharge diagnosis  Acute UTI Dysautonomia MGUS Immobility   Person spoke with today (if not patient) and relationship  Amina, herbie   Meds reviewed with patient/caregiver?  Yes   Is the patient taking all medications as directed (includes completed medication regime)?  Yes   Has the patient kept scheduled appointments due by today?  Yes   Is the patient still receiving Home Health Services?  N/A   Comments  She has enlarged thyroid. Saw endocrinologist   What is the patient's perception of their health status since discharge?  New symptoms unrelated to diagnosis [Has an enlarge thyroid saw endocrinologist today]   Week 4 Call Completed?  Yes   Would the patient like one additional call?  No   Graduated  Yes   Is the patient interested in additional calls from an ambulatory ?  NOTE:  applies to high risk patients requiring additional follow-up.  No          Mariana Daniels RN

## 2021-05-09 ENCOUNTER — APPOINTMENT (OUTPATIENT)
Dept: GENERAL RADIOLOGY | Facility: HOSPITAL | Age: 86
End: 2021-05-09

## 2021-05-09 ENCOUNTER — APPOINTMENT (OUTPATIENT)
Dept: CT IMAGING | Facility: HOSPITAL | Age: 86
End: 2021-05-09

## 2021-05-09 ENCOUNTER — HOSPITAL ENCOUNTER (OUTPATIENT)
Facility: HOSPITAL | Age: 86
Setting detail: OBSERVATION
Discharge: HOME | End: 2021-05-14
Attending: EMERGENCY MEDICINE | Admitting: INTERNAL MEDICINE

## 2021-05-09 DIAGNOSIS — R06.02 SHORTNESS OF BREATH: ICD-10-CM

## 2021-05-09 DIAGNOSIS — R10.13 EPIGASTRIC PAIN: ICD-10-CM

## 2021-05-09 DIAGNOSIS — K80.50 CHOLEDOCHOLITHIASIS: Primary | ICD-10-CM

## 2021-05-09 LAB
ALBUMIN SERPL-MCNC: 3.5 G/DL (ref 3.5–5.2)
ALBUMIN/GLOB SERPL: 1 G/DL
ALP SERPL-CCNC: 79 U/L (ref 39–117)
ALT SERPL W P-5'-P-CCNC: 8 U/L (ref 1–33)
ANION GAP SERPL CALCULATED.3IONS-SCNC: 11.8 MMOL/L (ref 5–15)
AST SERPL-CCNC: 16 U/L (ref 1–32)
B PARAPERT DNA SPEC QL NAA+PROBE: NOT DETECTED
B PERT DNA SPEC QL NAA+PROBE: NOT DETECTED
BASOPHILS # BLD AUTO: 0.02 10*3/MM3 (ref 0–0.2)
BASOPHILS NFR BLD AUTO: 0.5 % (ref 0–1.5)
BILIRUB SERPL-MCNC: 1.5 MG/DL (ref 0–1.2)
BUN SERPL-MCNC: 8 MG/DL (ref 8–23)
BUN/CREAT SERPL: 14.5 (ref 7–25)
C PNEUM DNA NPH QL NAA+NON-PROBE: NOT DETECTED
CALCIUM SPEC-SCNC: 10 MG/DL (ref 8.6–10.5)
CHLORIDE SERPL-SCNC: 104 MMOL/L (ref 98–107)
CO2 SERPL-SCNC: 21.2 MMOL/L (ref 22–29)
CREAT SERPL-MCNC: 0.55 MG/DL (ref 0.57–1)
DEPRECATED RDW RBC AUTO: 44.7 FL (ref 37–54)
EOSINOPHIL # BLD AUTO: 0.03 10*3/MM3 (ref 0–0.4)
EOSINOPHIL NFR BLD AUTO: 0.7 % (ref 0.3–6.2)
ERYTHROCYTE [DISTWIDTH] IN BLOOD BY AUTOMATED COUNT: 12.7 % (ref 12.3–15.4)
FLUAV SUBTYP SPEC NAA+PROBE: NOT DETECTED
FLUBV RNA ISLT QL NAA+PROBE: NOT DETECTED
GFR SERPL CREATININE-BSD FRML MDRD: 127 ML/MIN/1.73
GLOBULIN UR ELPH-MCNC: 3.5 GM/DL
GLUCOSE SERPL-MCNC: 103 MG/DL (ref 65–99)
HADV DNA SPEC NAA+PROBE: NOT DETECTED
HCOV 229E RNA SPEC QL NAA+PROBE: NOT DETECTED
HCOV HKU1 RNA SPEC QL NAA+PROBE: NOT DETECTED
HCOV NL63 RNA SPEC QL NAA+PROBE: NOT DETECTED
HCOV OC43 RNA SPEC QL NAA+PROBE: NOT DETECTED
HCT VFR BLD AUTO: 40.2 % (ref 34–46.6)
HGB BLD-MCNC: 13.7 G/DL (ref 12–15.9)
HMPV RNA NPH QL NAA+NON-PROBE: NOT DETECTED
HOLD SPECIMEN: NORMAL
HPIV1 RNA SPEC QL NAA+PROBE: NOT DETECTED
HPIV2 RNA SPEC QL NAA+PROBE: NOT DETECTED
HPIV3 RNA NPH QL NAA+PROBE: NOT DETECTED
HPIV4 P GENE NPH QL NAA+PROBE: NOT DETECTED
IMM GRANULOCYTES # BLD AUTO: 0.01 10*3/MM3 (ref 0–0.05)
IMM GRANULOCYTES NFR BLD AUTO: 0.2 % (ref 0–0.5)
INR PPP: 3.47 (ref 0.9–1.1)
LIPASE SERPL-CCNC: 14 U/L (ref 13–60)
LYMPHOCYTES # BLD AUTO: 1.67 10*3/MM3 (ref 0.7–3.1)
LYMPHOCYTES NFR BLD AUTO: 39.7 % (ref 19.6–45.3)
M PNEUMO IGG SER IA-ACNC: NOT DETECTED
MCH RBC QN AUTO: 33.2 PG (ref 26.6–33)
MCHC RBC AUTO-ENTMCNC: 34.1 G/DL (ref 31.5–35.7)
MCV RBC AUTO: 97.3 FL (ref 79–97)
MONOCYTES # BLD AUTO: 0.4 10*3/MM3 (ref 0.1–0.9)
MONOCYTES NFR BLD AUTO: 9.5 % (ref 5–12)
NEUTROPHILS NFR BLD AUTO: 2.08 10*3/MM3 (ref 1.7–7)
NEUTROPHILS NFR BLD AUTO: 49.4 % (ref 42.7–76)
NRBC BLD AUTO-RTO: 0 /100 WBC (ref 0–0.2)
NT-PROBNP SERPL-MCNC: 762.9 PG/ML (ref 0–1800)
PLATELET # BLD AUTO: 160 10*3/MM3 (ref 140–450)
PMV BLD AUTO: 9.1 FL (ref 6–12)
POTASSIUM SERPL-SCNC: 4.5 MMOL/L (ref 3.5–5.2)
PROT SERPL-MCNC: 7 G/DL (ref 6–8.5)
PROTHROMBIN TIME: 34.6 SECONDS (ref 11.7–14.2)
QT INTERVAL: 443 MS
RBC # BLD AUTO: 4.13 10*6/MM3 (ref 3.77–5.28)
RHINOVIRUS RNA SPEC NAA+PROBE: NOT DETECTED
RSV RNA NPH QL NAA+NON-PROBE: NOT DETECTED
SARS-COV-2 RNA NPH QL NAA+NON-PROBE: NOT DETECTED
SODIUM SERPL-SCNC: 137 MMOL/L (ref 136–145)
TROPONIN T SERPL-MCNC: <0.01 NG/ML (ref 0–0.03)
WBC # BLD AUTO: 4.21 10*3/MM3 (ref 3.4–10.8)

## 2021-05-09 PROCEDURE — 25010000002 DIPHENHYDRAMINE PER 50 MG: Performed by: EMERGENCY MEDICINE

## 2021-05-09 PROCEDURE — 83690 ASSAY OF LIPASE: CPT | Performed by: EMERGENCY MEDICINE

## 2021-05-09 PROCEDURE — 83880 ASSAY OF NATRIURETIC PEPTIDE: CPT | Performed by: EMERGENCY MEDICINE

## 2021-05-09 PROCEDURE — 25010000002 METHYLPREDNISOLONE PER 40 MG: Performed by: EMERGENCY MEDICINE

## 2021-05-09 PROCEDURE — 96375 TX/PRO/DX INJ NEW DRUG ADDON: CPT

## 2021-05-09 PROCEDURE — G0378 HOSPITAL OBSERVATION PER HR: HCPCS

## 2021-05-09 PROCEDURE — 71275 CT ANGIOGRAPHY CHEST: CPT

## 2021-05-09 PROCEDURE — 99285 EMERGENCY DEPT VISIT HI MDM: CPT

## 2021-05-09 PROCEDURE — 84484 ASSAY OF TROPONIN QUANT: CPT | Performed by: EMERGENCY MEDICINE

## 2021-05-09 PROCEDURE — 96376 TX/PRO/DX INJ SAME DRUG ADON: CPT

## 2021-05-09 PROCEDURE — 96361 HYDRATE IV INFUSION ADD-ON: CPT

## 2021-05-09 PROCEDURE — 85025 COMPLETE CBC W/AUTO DIFF WBC: CPT | Performed by: EMERGENCY MEDICINE

## 2021-05-09 PROCEDURE — 0 IOPAMIDOL PER 1 ML: Performed by: EMERGENCY MEDICINE

## 2021-05-09 PROCEDURE — 71045 X-RAY EXAM CHEST 1 VIEW: CPT

## 2021-05-09 PROCEDURE — 85610 PROTHROMBIN TIME: CPT | Performed by: EMERGENCY MEDICINE

## 2021-05-09 PROCEDURE — 93005 ELECTROCARDIOGRAM TRACING: CPT

## 2021-05-09 PROCEDURE — 93010 ELECTROCARDIOGRAM REPORT: CPT | Performed by: INTERNAL MEDICINE

## 2021-05-09 PROCEDURE — 25010000002 HYDROMORPHONE PER 4 MG: Performed by: EMERGENCY MEDICINE

## 2021-05-09 PROCEDURE — 25010000002 CEFTRIAXONE PER 250 MG: Performed by: INTERNAL MEDICINE

## 2021-05-09 PROCEDURE — 80053 COMPREHEN METABOLIC PANEL: CPT | Performed by: EMERGENCY MEDICINE

## 2021-05-09 PROCEDURE — 0202U NFCT DS 22 TRGT SARS-COV-2: CPT | Performed by: EMERGENCY MEDICINE

## 2021-05-09 PROCEDURE — 25010000002 ONDANSETRON PER 1 MG: Performed by: EMERGENCY MEDICINE

## 2021-05-09 PROCEDURE — 96365 THER/PROPH/DIAG IV INF INIT: CPT

## 2021-05-09 PROCEDURE — 74177 CT ABD & PELVIS W/CONTRAST: CPT

## 2021-05-09 RX ORDER — PREDNISONE 10 MG/1
15 TABLET ORAL
Status: DISCONTINUED | OUTPATIENT
Start: 2021-05-10 | End: 2021-05-13

## 2021-05-09 RX ORDER — CEFTRIAXONE SODIUM 1 G/50ML
1 INJECTION, SOLUTION INTRAVENOUS EVERY 24 HOURS
Status: DISCONTINUED | OUTPATIENT
Start: 2021-05-09 | End: 2021-05-10

## 2021-05-09 RX ORDER — HYDROMORPHONE HYDROCHLORIDE 1 MG/ML
0.5 INJECTION, SOLUTION INTRAMUSCULAR; INTRAVENOUS; SUBCUTANEOUS ONCE
Status: COMPLETED | OUTPATIENT
Start: 2021-05-09 | End: 2021-05-09

## 2021-05-09 RX ORDER — NITROGLYCERIN 0.4 MG/1
0.4 TABLET SUBLINGUAL
Status: DISCONTINUED | OUTPATIENT
Start: 2021-05-09 | End: 2021-05-14 | Stop reason: HOSPADM

## 2021-05-09 RX ORDER — DIPHENHYDRAMINE HYDROCHLORIDE 50 MG/ML
50 INJECTION INTRAMUSCULAR; INTRAVENOUS ONCE
Status: COMPLETED | OUTPATIENT
Start: 2021-05-09 | End: 2021-05-09

## 2021-05-09 RX ORDER — SODIUM CHLORIDE, SODIUM LACTATE, POTASSIUM CHLORIDE, CALCIUM CHLORIDE 600; 310; 30; 20 MG/100ML; MG/100ML; MG/100ML; MG/100ML
50 INJECTION, SOLUTION INTRAVENOUS CONTINUOUS
Status: DISCONTINUED | OUTPATIENT
Start: 2021-05-09 | End: 2021-05-14 | Stop reason: HOSPADM

## 2021-05-09 RX ORDER — ONDANSETRON 4 MG/1
4 TABLET, FILM COATED ORAL EVERY 6 HOURS PRN
Status: DISCONTINUED | OUTPATIENT
Start: 2021-05-09 | End: 2021-05-14 | Stop reason: HOSPADM

## 2021-05-09 RX ORDER — UREA 10 %
3 LOTION (ML) TOPICAL NIGHTLY PRN
Status: DISCONTINUED | OUTPATIENT
Start: 2021-05-09 | End: 2021-05-14 | Stop reason: HOSPADM

## 2021-05-09 RX ORDER — ACETAMINOPHEN 325 MG/1
650 TABLET ORAL EVERY 4 HOURS PRN
Status: DISCONTINUED | OUTPATIENT
Start: 2021-05-09 | End: 2021-05-14 | Stop reason: HOSPADM

## 2021-05-09 RX ORDER — WARFARIN SODIUM 2.5 MG/1
2.5 TABLET ORAL NIGHTLY
COMMUNITY
End: 2021-08-14 | Stop reason: HOSPADM

## 2021-05-09 RX ORDER — METHYLPREDNISOLONE SODIUM SUCCINATE 40 MG/ML
40 INJECTION, POWDER, LYOPHILIZED, FOR SOLUTION INTRAMUSCULAR; INTRAVENOUS EVERY 4 HOURS
Status: DISPENSED | OUTPATIENT
Start: 2021-05-09 | End: 2021-05-09

## 2021-05-09 RX ORDER — CHOLECALCIFEROL (VITAMIN D3) 125 MCG
1000 CAPSULE ORAL DAILY
Status: DISCONTINUED | OUTPATIENT
Start: 2021-05-10 | End: 2021-05-14 | Stop reason: HOSPADM

## 2021-05-09 RX ORDER — ALBUTEROL SULFATE 2.5 MG/3ML
2.5 SOLUTION RESPIRATORY (INHALATION) EVERY 6 HOURS PRN
Status: DISCONTINUED | OUTPATIENT
Start: 2021-05-09 | End: 2021-05-14 | Stop reason: HOSPADM

## 2021-05-09 RX ORDER — PANTOPRAZOLE SODIUM 40 MG/1
40 TABLET, DELAYED RELEASE ORAL EVERY MORNING
Refills: 0 | Status: DISCONTINUED | OUTPATIENT
Start: 2021-05-10 | End: 2021-05-11

## 2021-05-09 RX ORDER — ONDANSETRON 2 MG/ML
4 INJECTION INTRAMUSCULAR; INTRAVENOUS EVERY 6 HOURS PRN
Status: DISCONTINUED | OUTPATIENT
Start: 2021-05-09 | End: 2021-05-14 | Stop reason: HOSPADM

## 2021-05-09 RX ORDER — MORPHINE SULFATE 2 MG/ML
2 INJECTION, SOLUTION INTRAMUSCULAR; INTRAVENOUS EVERY 4 HOURS PRN
Status: DISCONTINUED | OUTPATIENT
Start: 2021-05-09 | End: 2021-05-14 | Stop reason: HOSPADM

## 2021-05-09 RX ORDER — MESALAMINE 0.38 G/1
1.5 CAPSULE, EXTENDED RELEASE ORAL DAILY
Status: DISCONTINUED | OUTPATIENT
Start: 2021-05-10 | End: 2021-05-14 | Stop reason: HOSPADM

## 2021-05-09 RX ORDER — SODIUM CHLORIDE 0.9 % (FLUSH) 0.9 %
10 SYRINGE (ML) INJECTION AS NEEDED
Status: DISCONTINUED | OUTPATIENT
Start: 2021-05-09 | End: 2021-05-14 | Stop reason: HOSPADM

## 2021-05-09 RX ORDER — ONDANSETRON 2 MG/ML
4 INJECTION INTRAMUSCULAR; INTRAVENOUS ONCE
Status: COMPLETED | OUTPATIENT
Start: 2021-05-09 | End: 2021-05-09

## 2021-05-09 RX ADMIN — ONDANSETRON 4 MG: 2 INJECTION INTRAMUSCULAR; INTRAVENOUS at 11:32

## 2021-05-09 RX ADMIN — SODIUM CHLORIDE, POTASSIUM CHLORIDE, SODIUM LACTATE AND CALCIUM CHLORIDE 100 ML/HR: 600; 310; 30; 20 INJECTION, SOLUTION INTRAVENOUS at 20:08

## 2021-05-09 RX ADMIN — DIPHENHYDRAMINE HYDROCHLORIDE 50 MG: 50 INJECTION, SOLUTION INTRAMUSCULAR; INTRAVENOUS at 11:33

## 2021-05-09 RX ADMIN — IOPAMIDOL 95 ML: 755 INJECTION, SOLUTION INTRAVENOUS at 12:51

## 2021-05-09 RX ADMIN — METHYLPREDNISOLONE SODIUM SUCCINATE 40 MG: 40 INJECTION, POWDER, FOR SOLUTION INTRAMUSCULAR; INTRAVENOUS at 11:33

## 2021-05-09 RX ADMIN — METHYLPREDNISOLONE SODIUM SUCCINATE 40 MG: 40 INJECTION, POWDER, FOR SOLUTION INTRAMUSCULAR; INTRAVENOUS at 20:07

## 2021-05-09 RX ADMIN — CEFTRIAXONE SODIUM 1 G: 1 INJECTION, SOLUTION INTRAVENOUS at 21:06

## 2021-05-09 RX ADMIN — HYDROMORPHONE HYDROCHLORIDE 0.5 MG: 1 INJECTION, SOLUTION INTRAMUSCULAR; INTRAVENOUS; SUBCUTANEOUS at 11:32

## 2021-05-10 PROBLEM — R73.9 HYPERGLYCEMIA: Status: ACTIVE | Noted: 2021-05-10

## 2021-05-10 PROBLEM — I48.91 ATRIAL FIBRILLATION: Status: ACTIVE | Noted: 2019-10-06

## 2021-05-10 PROBLEM — I10 ESSENTIAL HYPERTENSION: Status: ACTIVE | Noted: 2021-05-10

## 2021-05-10 LAB
ANION GAP SERPL CALCULATED.3IONS-SCNC: 7 MMOL/L (ref 5–15)
BUN SERPL-MCNC: 9 MG/DL (ref 8–23)
BUN/CREAT SERPL: 19.1 (ref 7–25)
CALCIUM SPEC-SCNC: 9.3 MG/DL (ref 8.6–10.5)
CHLORIDE SERPL-SCNC: 104 MMOL/L (ref 98–107)
CO2 SERPL-SCNC: 23 MMOL/L (ref 22–29)
CREAT SERPL-MCNC: 0.47 MG/DL (ref 0.57–1)
DEPRECATED RDW RBC AUTO: 44.3 FL (ref 37–54)
ERYTHROCYTE [DISTWIDTH] IN BLOOD BY AUTOMATED COUNT: 12.8 % (ref 12.3–15.4)
GFR SERPL CREATININE-BSD FRML MDRD: >150 ML/MIN/1.73
GLUCOSE SERPL-MCNC: 123 MG/DL (ref 65–99)
HCT VFR BLD AUTO: 37.8 % (ref 34–46.6)
HGB BLD-MCNC: 12.7 G/DL (ref 12–15.9)
MCH RBC QN AUTO: 32.7 PG (ref 26.6–33)
MCHC RBC AUTO-ENTMCNC: 33.6 G/DL (ref 31.5–35.7)
MCV RBC AUTO: 97.4 FL (ref 79–97)
PLATELET # BLD AUTO: 153 10*3/MM3 (ref 140–450)
PMV BLD AUTO: 9 FL (ref 6–12)
POTASSIUM SERPL-SCNC: 4.7 MMOL/L (ref 3.5–5.2)
RBC # BLD AUTO: 3.88 10*6/MM3 (ref 3.77–5.28)
SODIUM SERPL-SCNC: 134 MMOL/L (ref 136–145)
WBC # BLD AUTO: 3.99 10*3/MM3 (ref 3.4–10.8)

## 2021-05-10 PROCEDURE — 99214 OFFICE O/P EST MOD 30 MIN: CPT | Performed by: INTERNAL MEDICINE

## 2021-05-10 PROCEDURE — 80048 BASIC METABOLIC PNL TOTAL CA: CPT | Performed by: INTERNAL MEDICINE

## 2021-05-10 PROCEDURE — G0378 HOSPITAL OBSERVATION PER HR: HCPCS

## 2021-05-10 PROCEDURE — 96361 HYDRATE IV INFUSION ADD-ON: CPT

## 2021-05-10 PROCEDURE — 85027 COMPLETE CBC AUTOMATED: CPT | Performed by: INTERNAL MEDICINE

## 2021-05-10 PROCEDURE — 63710000001 PREDNISONE PER 5 MG: Performed by: INTERNAL MEDICINE

## 2021-05-10 RX ORDER — DOCUSATE SODIUM 100 MG/1
100 CAPSULE, LIQUID FILLED ORAL 2 TIMES DAILY
Status: DISCONTINUED | OUTPATIENT
Start: 2021-05-10 | End: 2021-05-14 | Stop reason: HOSPADM

## 2021-05-10 RX ADMIN — PREDNISONE 15 MG: 10 TABLET ORAL at 08:17

## 2021-05-10 RX ADMIN — Medication 1000 MCG: at 08:17

## 2021-05-10 RX ADMIN — DOCUSATE SODIUM 100 MG: 100 CAPSULE, LIQUID FILLED ORAL at 20:39

## 2021-05-10 RX ADMIN — MESALAMINE 1.5 G: 0.38 CAPSULE, EXTENDED RELEASE ORAL at 08:17

## 2021-05-10 RX ADMIN — SODIUM CHLORIDE, POTASSIUM CHLORIDE, SODIUM LACTATE AND CALCIUM CHLORIDE 100 ML/HR: 600; 310; 30; 20 INJECTION, SOLUTION INTRAVENOUS at 05:42

## 2021-05-10 RX ADMIN — PANTOPRAZOLE SODIUM 40 MG: 40 TABLET, DELAYED RELEASE ORAL at 05:42

## 2021-05-10 RX ADMIN — SODIUM CHLORIDE, POTASSIUM CHLORIDE, SODIUM LACTATE AND CALCIUM CHLORIDE 100 ML/HR: 600; 310; 30; 20 INJECTION, SOLUTION INTRAVENOUS at 20:39

## 2021-05-11 LAB
ALBUMIN SERPL-MCNC: 3.1 G/DL (ref 3.5–5.2)
ALBUMIN/GLOB SERPL: 1 G/DL
ALP SERPL-CCNC: 66 U/L (ref 39–117)
ALT SERPL W P-5'-P-CCNC: 9 U/L (ref 1–33)
ANION GAP SERPL CALCULATED.3IONS-SCNC: 6 MMOL/L (ref 5–15)
AST SERPL-CCNC: 13 U/L (ref 1–32)
BASOPHILS # BLD AUTO: 0.02 10*3/MM3 (ref 0–0.2)
BASOPHILS NFR BLD AUTO: 0.4 % (ref 0–1.5)
BILIRUB SERPL-MCNC: 1 MG/DL (ref 0–1.2)
BUN SERPL-MCNC: 8 MG/DL (ref 8–23)
BUN/CREAT SERPL: 14 (ref 7–25)
CALCIUM SPEC-SCNC: 9.6 MG/DL (ref 8.6–10.5)
CHLORIDE SERPL-SCNC: 107 MMOL/L (ref 98–107)
CO2 SERPL-SCNC: 26 MMOL/L (ref 22–29)
CREAT SERPL-MCNC: 0.57 MG/DL (ref 0.57–1)
DEPRECATED RDW RBC AUTO: 46.4 FL (ref 37–54)
EOSINOPHIL # BLD AUTO: 0.02 10*3/MM3 (ref 0–0.4)
EOSINOPHIL NFR BLD AUTO: 0.4 % (ref 0.3–6.2)
ERYTHROCYTE [DISTWIDTH] IN BLOOD BY AUTOMATED COUNT: 13 % (ref 12.3–15.4)
GFR SERPL CREATININE-BSD FRML MDRD: 122 ML/MIN/1.73
GLOBULIN UR ELPH-MCNC: 3.2 GM/DL
GLUCOSE SERPL-MCNC: 75 MG/DL (ref 65–99)
HBA1C MFR BLD: 5.3 % (ref 4.8–5.6)
HCT VFR BLD AUTO: 35.2 % (ref 34–46.6)
HGB BLD-MCNC: 11.7 G/DL (ref 12–15.9)
IMM GRANULOCYTES # BLD AUTO: 0.02 10*3/MM3 (ref 0–0.05)
IMM GRANULOCYTES NFR BLD AUTO: 0.4 % (ref 0–0.5)
INR PPP: 2.91 (ref 0.9–1.1)
LYMPHOCYTES # BLD AUTO: 2.2 10*3/MM3 (ref 0.7–3.1)
LYMPHOCYTES NFR BLD AUTO: 46.3 % (ref 19.6–45.3)
MCH RBC QN AUTO: 32.1 PG (ref 26.6–33)
MCHC RBC AUTO-ENTMCNC: 33.2 G/DL (ref 31.5–35.7)
MCV RBC AUTO: 96.7 FL (ref 79–97)
MONOCYTES # BLD AUTO: 0.54 10*3/MM3 (ref 0.1–0.9)
MONOCYTES NFR BLD AUTO: 11.4 % (ref 5–12)
NEUTROPHILS NFR BLD AUTO: 1.95 10*3/MM3 (ref 1.7–7)
NEUTROPHILS NFR BLD AUTO: 41.1 % (ref 42.7–76)
NRBC BLD AUTO-RTO: 0 /100 WBC (ref 0–0.2)
PLATELET # BLD AUTO: 152 10*3/MM3 (ref 140–450)
PMV BLD AUTO: 9.1 FL (ref 6–12)
POTASSIUM SERPL-SCNC: 4.3 MMOL/L (ref 3.5–5.2)
PROT SERPL-MCNC: 6.3 G/DL (ref 6–8.5)
PROTHROMBIN TIME: 30.2 SECONDS (ref 11.7–14.2)
QT INTERVAL: 443 MS
RBC # BLD AUTO: 3.64 10*6/MM3 (ref 3.77–5.28)
SODIUM SERPL-SCNC: 139 MMOL/L (ref 136–145)
WBC # BLD AUTO: 4.75 10*3/MM3 (ref 3.4–10.8)

## 2021-05-11 PROCEDURE — 96375 TX/PRO/DX INJ NEW DRUG ADDON: CPT

## 2021-05-11 PROCEDURE — 93010 ELECTROCARDIOGRAM REPORT: CPT | Performed by: INTERNAL MEDICINE

## 2021-05-11 PROCEDURE — 83036 HEMOGLOBIN GLYCOSYLATED A1C: CPT | Performed by: INTERNAL MEDICINE

## 2021-05-11 PROCEDURE — 80053 COMPREHEN METABOLIC PANEL: CPT | Performed by: INTERNAL MEDICINE

## 2021-05-11 PROCEDURE — G0378 HOSPITAL OBSERVATION PER HR: HCPCS

## 2021-05-11 PROCEDURE — 63710000001 PREDNISONE PER 5 MG: Performed by: INTERNAL MEDICINE

## 2021-05-11 PROCEDURE — 99214 OFFICE O/P EST MOD 30 MIN: CPT | Performed by: INTERNAL MEDICINE

## 2021-05-11 PROCEDURE — 93005 ELECTROCARDIOGRAM TRACING: CPT | Performed by: INTERNAL MEDICINE

## 2021-05-11 PROCEDURE — 96361 HYDRATE IV INFUSION ADD-ON: CPT

## 2021-05-11 PROCEDURE — 85610 PROTHROMBIN TIME: CPT | Performed by: INTERNAL MEDICINE

## 2021-05-11 PROCEDURE — 85025 COMPLETE CBC W/AUTO DIFF WBC: CPT | Performed by: INTERNAL MEDICINE

## 2021-05-11 RX ORDER — PANTOPRAZOLE SODIUM 40 MG/10ML
40 INJECTION, POWDER, LYOPHILIZED, FOR SOLUTION INTRAVENOUS EVERY 12 HOURS SCHEDULED
Status: DISCONTINUED | OUTPATIENT
Start: 2021-05-11 | End: 2021-05-14 | Stop reason: HOSPADM

## 2021-05-11 RX ADMIN — PANTOPRAZOLE SODIUM 40 MG: 40 INJECTION, POWDER, FOR SOLUTION INTRAVENOUS at 21:19

## 2021-05-11 RX ADMIN — PREDNISONE 15 MG: 10 TABLET ORAL at 08:54

## 2021-05-11 RX ADMIN — PANTOPRAZOLE SODIUM 40 MG: 40 TABLET, DELAYED RELEASE ORAL at 06:14

## 2021-05-11 RX ADMIN — DOCUSATE SODIUM 100 MG: 100 CAPSULE, LIQUID FILLED ORAL at 08:54

## 2021-05-11 RX ADMIN — SODIUM CHLORIDE, POTASSIUM CHLORIDE, SODIUM LACTATE AND CALCIUM CHLORIDE 100 ML/HR: 600; 310; 30; 20 INJECTION, SOLUTION INTRAVENOUS at 08:57

## 2021-05-11 RX ADMIN — NITROGLYCERIN 0.4 MG: 0.4 TABLET SUBLINGUAL at 07:40

## 2021-05-11 RX ADMIN — MESALAMINE 1.5 G: 0.38 CAPSULE, EXTENDED RELEASE ORAL at 08:54

## 2021-05-11 RX ADMIN — Medication 1000 MCG: at 08:54

## 2021-05-12 PROBLEM — R10.13 EPIGASTRIC PAIN: Status: ACTIVE | Noted: 2021-05-12

## 2021-05-12 LAB
ALBUMIN SERPL-MCNC: 3.1 G/DL (ref 3.5–5.2)
ALBUMIN/GLOB SERPL: 1 G/DL
ALP SERPL-CCNC: 66 U/L (ref 39–117)
ALT SERPL W P-5'-P-CCNC: 9 U/L (ref 1–33)
ANION GAP SERPL CALCULATED.3IONS-SCNC: 6.4 MMOL/L (ref 5–15)
AST SERPL-CCNC: 13 U/L (ref 1–32)
BILIRUB SERPL-MCNC: 1.1 MG/DL (ref 0–1.2)
BUN SERPL-MCNC: 6 MG/DL (ref 8–23)
BUN/CREAT SERPL: 12 (ref 7–25)
CALCIUM SPEC-SCNC: 9.4 MG/DL (ref 8.6–10.5)
CHLORIDE SERPL-SCNC: 107 MMOL/L (ref 98–107)
CO2 SERPL-SCNC: 25.6 MMOL/L (ref 22–29)
CREAT SERPL-MCNC: 0.5 MG/DL (ref 0.57–1)
DEPRECATED RDW RBC AUTO: 43.9 FL (ref 37–54)
ERYTHROCYTE [DISTWIDTH] IN BLOOD BY AUTOMATED COUNT: 12.8 % (ref 12.3–15.4)
GFR SERPL CREATININE-BSD FRML MDRD: 142 ML/MIN/1.73
GLOBULIN UR ELPH-MCNC: 3.2 GM/DL
GLUCOSE SERPL-MCNC: 72 MG/DL (ref 65–99)
HCT VFR BLD AUTO: 35.9 % (ref 34–46.6)
HGB BLD-MCNC: 12 G/DL (ref 12–15.9)
INR PPP: 2.04 (ref 0.9–1.1)
LYMPHOCYTES # BLD MANUAL: 1.79 10*3/MM3 (ref 0.7–3.1)
LYMPHOCYTES NFR BLD MANUAL: 12.8 % (ref 5–12)
LYMPHOCYTES NFR BLD MANUAL: 48.9 % (ref 19.6–45.3)
MCH RBC QN AUTO: 32 PG (ref 26.6–33)
MCHC RBC AUTO-ENTMCNC: 33.4 G/DL (ref 31.5–35.7)
MCV RBC AUTO: 95.7 FL (ref 79–97)
MONOCYTES # BLD AUTO: 0.47 10*3/MM3 (ref 0.1–0.9)
NEUTROPHILS # BLD AUTO: 1.4 10*3/MM3 (ref 1.7–7)
NEUTROPHILS NFR BLD MANUAL: 38.3 % (ref 42.7–76)
PLAT MORPH BLD: NORMAL
PLATELET # BLD AUTO: 169 10*3/MM3 (ref 140–450)
PMV BLD AUTO: 9 FL (ref 6–12)
POTASSIUM SERPL-SCNC: 4.1 MMOL/L (ref 3.5–5.2)
PROT SERPL-MCNC: 6.3 G/DL (ref 6–8.5)
PROTHROMBIN TIME: 22.8 SECONDS (ref 11.7–14.2)
RBC # BLD AUTO: 3.75 10*6/MM3 (ref 3.77–5.28)
RBC MORPH BLD: NORMAL
SODIUM SERPL-SCNC: 139 MMOL/L (ref 136–145)
WBC # BLD AUTO: 3.66 10*3/MM3 (ref 3.4–10.8)
WBC MORPH BLD: NORMAL

## 2021-05-12 PROCEDURE — 63710000001 PREDNISONE PER 5 MG: Performed by: INTERNAL MEDICINE

## 2021-05-12 PROCEDURE — 85025 COMPLETE CBC W/AUTO DIFF WBC: CPT | Performed by: INTERNAL MEDICINE

## 2021-05-12 PROCEDURE — 99214 OFFICE O/P EST MOD 30 MIN: CPT | Performed by: INTERNAL MEDICINE

## 2021-05-12 PROCEDURE — 96376 TX/PRO/DX INJ SAME DRUG ADON: CPT

## 2021-05-12 PROCEDURE — 85610 PROTHROMBIN TIME: CPT | Performed by: INTERNAL MEDICINE

## 2021-05-12 PROCEDURE — 80053 COMPREHEN METABOLIC PANEL: CPT | Performed by: INTERNAL MEDICINE

## 2021-05-12 PROCEDURE — 96361 HYDRATE IV INFUSION ADD-ON: CPT

## 2021-05-12 PROCEDURE — 85007 BL SMEAR W/DIFF WBC COUNT: CPT | Performed by: INTERNAL MEDICINE

## 2021-05-12 PROCEDURE — G0378 HOSPITAL OBSERVATION PER HR: HCPCS

## 2021-05-12 RX ADMIN — MESALAMINE 1.5 G: 0.38 CAPSULE, EXTENDED RELEASE ORAL at 08:51

## 2021-05-12 RX ADMIN — PREDNISONE 15 MG: 10 TABLET ORAL at 08:51

## 2021-05-12 RX ADMIN — Medication 1000 MCG: at 08:51

## 2021-05-12 RX ADMIN — SODIUM CHLORIDE, POTASSIUM CHLORIDE, SODIUM LACTATE AND CALCIUM CHLORIDE 100 ML/HR: 600; 310; 30; 20 INJECTION, SOLUTION INTRAVENOUS at 22:56

## 2021-05-12 RX ADMIN — SODIUM CHLORIDE, POTASSIUM CHLORIDE, SODIUM LACTATE AND CALCIUM CHLORIDE 100 ML/HR: 600; 310; 30; 20 INJECTION, SOLUTION INTRAVENOUS at 08:51

## 2021-05-12 RX ADMIN — PANTOPRAZOLE SODIUM 40 MG: 40 INJECTION, POWDER, FOR SOLUTION INTRAVENOUS at 08:51

## 2021-05-12 RX ADMIN — PANTOPRAZOLE SODIUM 40 MG: 40 INJECTION, POWDER, FOR SOLUTION INTRAVENOUS at 20:31

## 2021-05-13 ENCOUNTER — PREP FOR SURGERY (OUTPATIENT)
Dept: OTHER | Facility: HOSPITAL | Age: 86
End: 2021-05-13

## 2021-05-13 ENCOUNTER — ANESTHESIA EVENT (OUTPATIENT)
Dept: GASTROENTEROLOGY | Facility: HOSPITAL | Age: 86
End: 2021-05-13

## 2021-05-13 ENCOUNTER — ANESTHESIA (OUTPATIENT)
Dept: GASTROENTEROLOGY | Facility: HOSPITAL | Age: 86
End: 2021-05-13

## 2021-05-13 DIAGNOSIS — R10.13 EPIGASTRIC PAIN: Primary | ICD-10-CM

## 2021-05-13 PROBLEM — K26.9 DUODENAL ULCER: Status: ACTIVE | Noted: 2021-05-13

## 2021-05-13 PROBLEM — K29.71 HEMORRHAGIC GASTRITIS: Status: ACTIVE | Noted: 2021-05-13

## 2021-05-13 LAB
ALBUMIN SERPL-MCNC: 3.1 G/DL (ref 3.5–5.2)
ALBUMIN/GLOB SERPL: 0.9 G/DL
ALP SERPL-CCNC: 71 U/L (ref 39–117)
ALT SERPL W P-5'-P-CCNC: 9 U/L (ref 1–33)
ANION GAP SERPL CALCULATED.3IONS-SCNC: 8.4 MMOL/L (ref 5–15)
AST SERPL-CCNC: 16 U/L (ref 1–32)
BASOPHILS # BLD AUTO: 0.01 10*3/MM3 (ref 0–0.2)
BASOPHILS NFR BLD AUTO: 0.3 % (ref 0–1.5)
BILIRUB SERPL-MCNC: 1.2 MG/DL (ref 0–1.2)
BUN SERPL-MCNC: 4 MG/DL (ref 8–23)
BUN/CREAT SERPL: 7.8 (ref 7–25)
CALCIUM SPEC-SCNC: 9.6 MG/DL (ref 8.6–10.5)
CHLORIDE SERPL-SCNC: 106 MMOL/L (ref 98–107)
CO2 SERPL-SCNC: 25.6 MMOL/L (ref 22–29)
CREAT SERPL-MCNC: 0.51 MG/DL (ref 0.57–1)
DEPRECATED RDW RBC AUTO: 45.4 FL (ref 37–54)
EOSINOPHIL # BLD AUTO: 0.03 10*3/MM3 (ref 0–0.4)
EOSINOPHIL NFR BLD AUTO: 0.8 % (ref 0.3–6.2)
ERYTHROCYTE [DISTWIDTH] IN BLOOD BY AUTOMATED COUNT: 12.6 % (ref 12.3–15.4)
GFR SERPL CREATININE-BSD FRML MDRD: 139 ML/MIN/1.73
GLOBULIN UR ELPH-MCNC: 3.5 GM/DL
GLUCOSE BLDC GLUCOMTR-MCNC: 134 MG/DL (ref 70–130)
GLUCOSE BLDC GLUCOMTR-MCNC: 76 MG/DL (ref 70–130)
GLUCOSE BLDC GLUCOMTR-MCNC: 78 MG/DL (ref 70–130)
GLUCOSE SERPL-MCNC: 68 MG/DL (ref 65–99)
HCT VFR BLD AUTO: 39.6 % (ref 34–46.6)
HGB BLD-MCNC: 13.1 G/DL (ref 12–15.9)
IMM GRANULOCYTES # BLD AUTO: 0 10*3/MM3 (ref 0–0.05)
IMM GRANULOCYTES NFR BLD AUTO: 0 % (ref 0–0.5)
INR PPP: 1.81 (ref 0.9–1.1)
LYMPHOCYTES # BLD AUTO: 1.86 10*3/MM3 (ref 0.7–3.1)
LYMPHOCYTES NFR BLD AUTO: 51.1 % (ref 19.6–45.3)
MCH RBC QN AUTO: 32.8 PG (ref 26.6–33)
MCHC RBC AUTO-ENTMCNC: 33.1 G/DL (ref 31.5–35.7)
MCV RBC AUTO: 99 FL (ref 79–97)
MONOCYTES # BLD AUTO: 0.4 10*3/MM3 (ref 0.1–0.9)
MONOCYTES NFR BLD AUTO: 11 % (ref 5–12)
NEUTROPHILS NFR BLD AUTO: 1.34 10*3/MM3 (ref 1.7–7)
NEUTROPHILS NFR BLD AUTO: 36.8 % (ref 42.7–76)
NRBC BLD AUTO-RTO: 0.3 /100 WBC (ref 0–0.2)
PLATELET # BLD AUTO: 137 10*3/MM3 (ref 140–450)
PMV BLD AUTO: 9.6 FL (ref 6–12)
POTASSIUM SERPL-SCNC: 3.8 MMOL/L (ref 3.5–5.2)
PROT SERPL-MCNC: 6.6 G/DL (ref 6–8.5)
PROTHROMBIN TIME: 20.8 SECONDS (ref 11.7–14.2)
RBC # BLD AUTO: 4 10*6/MM3 (ref 3.77–5.28)
SARS-COV-2 RNA RESP QL NAA+PROBE: NOT DETECTED
SODIUM SERPL-SCNC: 140 MMOL/L (ref 136–145)
WBC # BLD AUTO: 3.64 10*3/MM3 (ref 3.4–10.8)

## 2021-05-13 PROCEDURE — 43239 EGD BIOPSY SINGLE/MULTIPLE: CPT | Performed by: INTERNAL MEDICINE

## 2021-05-13 PROCEDURE — G0378 HOSPITAL OBSERVATION PER HR: HCPCS

## 2021-05-13 PROCEDURE — 96375 TX/PRO/DX INJ NEW DRUG ADDON: CPT

## 2021-05-13 PROCEDURE — 85610 PROTHROMBIN TIME: CPT | Performed by: INTERNAL MEDICINE

## 2021-05-13 PROCEDURE — 88305 TISSUE EXAM BY PATHOLOGIST: CPT | Performed by: INTERNAL MEDICINE

## 2021-05-13 PROCEDURE — 87081 CULTURE SCREEN ONLY: CPT | Performed by: INTERNAL MEDICINE

## 2021-05-13 PROCEDURE — 96376 TX/PRO/DX INJ SAME DRUG ADON: CPT

## 2021-05-13 PROCEDURE — 85025 COMPLETE CBC W/AUTO DIFF WBC: CPT | Performed by: INTERNAL MEDICINE

## 2021-05-13 PROCEDURE — 96361 HYDRATE IV INFUSION ADD-ON: CPT

## 2021-05-13 PROCEDURE — 80053 COMPREHEN METABOLIC PANEL: CPT | Performed by: INTERNAL MEDICINE

## 2021-05-13 PROCEDURE — 63710000001 PREDNISONE PER 5 MG: Performed by: INTERNAL MEDICINE

## 2021-05-13 PROCEDURE — U0003 INFECTIOUS AGENT DETECTION BY NUCLEIC ACID (DNA OR RNA); SEVERE ACUTE RESPIRATORY SYNDROME CORONAVIRUS 2 (SARS-COV-2) (CORONAVIRUS DISEASE [COVID-19]), AMPLIFIED PROBE TECHNIQUE, MAKING USE OF HIGH THROUGHPUT TECHNOLOGIES AS DESCRIBED BY CMS-2020-01-R: HCPCS | Performed by: INTERNAL MEDICINE

## 2021-05-13 PROCEDURE — 25010000002 PROPOFOL 10 MG/ML EMULSION: Performed by: ANESTHESIOLOGY

## 2021-05-13 PROCEDURE — 82962 GLUCOSE BLOOD TEST: CPT

## 2021-05-13 RX ORDER — SUCRALFATE 1 G/1
1 TABLET ORAL
Status: DISCONTINUED | OUTPATIENT
Start: 2021-05-13 | End: 2021-05-14 | Stop reason: HOSPADM

## 2021-05-13 RX ORDER — DEXTROSE MONOHYDRATE 25 G/50ML
50 INJECTION, SOLUTION INTRAVENOUS ONCE
Status: COMPLETED | OUTPATIENT
Start: 2021-05-13 | End: 2021-05-13

## 2021-05-13 RX ORDER — PROPOFOL 10 MG/ML
VIAL (ML) INTRAVENOUS CONTINUOUS PRN
Status: DISCONTINUED | OUTPATIENT
Start: 2021-05-13 | End: 2021-05-13 | Stop reason: SURG

## 2021-05-13 RX ORDER — LIDOCAINE HYDROCHLORIDE 20 MG/ML
INJECTION, SOLUTION INFILTRATION; PERINEURAL AS NEEDED
Status: DISCONTINUED | OUTPATIENT
Start: 2021-05-13 | End: 2021-05-13 | Stop reason: SURG

## 2021-05-13 RX ORDER — SODIUM CHLORIDE 9 MG/ML
30 INJECTION, SOLUTION INTRAVENOUS CONTINUOUS PRN
Status: DISCONTINUED | OUTPATIENT
Start: 2021-05-13 | End: 2021-05-13

## 2021-05-13 RX ADMIN — LIDOCAINE HYDROCHLORIDE 60 MG: 20 INJECTION, SOLUTION INFILTRATION; PERINEURAL at 11:09

## 2021-05-13 RX ADMIN — SODIUM CHLORIDE 30 ML/HR: 9 INJECTION, SOLUTION INTRAVENOUS at 10:25

## 2021-05-13 RX ADMIN — DEXTROSE MONOHYDRATE 50 ML: 500 INJECTION PARENTERAL at 08:02

## 2021-05-13 RX ADMIN — PANTOPRAZOLE SODIUM 40 MG: 40 INJECTION, POWDER, FOR SOLUTION INTRAVENOUS at 21:46

## 2021-05-13 RX ADMIN — SUCRALFATE 1 G: 1 TABLET ORAL at 21:46

## 2021-05-13 RX ADMIN — PREDNISONE 15 MG: 10 TABLET ORAL at 11:59

## 2021-05-13 RX ADMIN — MESALAMINE 1.5 G: 0.38 CAPSULE, EXTENDED RELEASE ORAL at 11:59

## 2021-05-13 RX ADMIN — PROPOFOL 100 MCG/KG/MIN: 10 INJECTION, EMULSION INTRAVENOUS at 11:12

## 2021-05-13 RX ADMIN — PANTOPRAZOLE SODIUM 40 MG: 40 INJECTION, POWDER, FOR SOLUTION INTRAVENOUS at 08:02

## 2021-05-13 RX ADMIN — PROPOFOL 80 MCG/KG/MIN: 10 INJECTION, EMULSION INTRAVENOUS at 11:08

## 2021-05-13 RX ADMIN — SODIUM CHLORIDE, POTASSIUM CHLORIDE, SODIUM LACTATE AND CALCIUM CHLORIDE 100 ML/HR: 600; 310; 30; 20 INJECTION, SOLUTION INTRAVENOUS at 08:50

## 2021-05-13 RX ADMIN — Medication 1000 MCG: at 11:59

## 2021-05-13 RX ADMIN — DOCUSATE SODIUM 100 MG: 100 CAPSULE, LIQUID FILLED ORAL at 21:46

## 2021-05-13 RX ADMIN — SUCRALFATE 1 G: 1 TABLET ORAL at 16:44

## 2021-05-13 NOTE — ANESTHESIA PREPROCEDURE EVALUATION
Anesthesia Evaluation                  Airway   Mallampati: II  TM distance: >3 FB  Neck ROM: full  No difficulty expected  Dental    (+) upper dentures    Pulmonary - normal exam   (+) COPD, asthma,sleep apnea,   Cardiovascular - normal exam    (+) hypertension, dysrhythmias Atrial Fib,       Neuro/Psych  GI/Hepatic/Renal/Endo    (+) obesity, morbid obesity, GERD, PUD, GI bleeding , renal disease stones, thyroid problem     Musculoskeletal     Abdominal    Substance History      OB/GYN          Other                        Anesthesia Plan    ASA 3     MAC     intravenous induction     Anesthetic plan, all risks, benefits, and alternatives have been provided, discussed and informed consent has been obtained with: patient.

## 2021-05-13 NOTE — ANESTHESIA POSTPROCEDURE EVALUATION
Patient: Brittany Villeda    Procedure Summary     Date: 05/13/21 [facility]    Anesthesia Start: 1101 Anesthesia Stop: 1125    Procedure: ESOPHAGOGASTRODUODENOSCOPY with BX (N/A Esophagus) Diagnosis:       Epigastric pain      (Epigastric pain [R10.13])    Surgeons: Stefan Mcfadden MD Provider: Gricelda Chaudhary MD    Anesthesia Type: MAC ASA Status: 3          Anesthesia Type: MAC    Vitals  No vitals data found for the desired time range.          Post Anesthesia Care and Evaluation    Patient location during evaluation: bedside  Patient participation: complete - patient participated  Level of consciousness: awake  Pain management: adequate  Airway patency: patent  Anesthetic complications: No anesthetic complications    Cardiovascular status: acceptable  Respiratory status: acceptable  Hydration status: acceptable

## 2021-05-14 ENCOUNTER — READMISSION MANAGEMENT (OUTPATIENT)
Dept: CALL CENTER | Facility: HOSPITAL | Age: 86
End: 2021-05-14

## 2021-05-14 VITALS
OXYGEN SATURATION: 96 % | WEIGHT: 209 LBS | HEART RATE: 70 BPM | TEMPERATURE: 97.6 F | BODY MASS INDEX: 35.68 KG/M2 | SYSTOLIC BLOOD PRESSURE: 145 MMHG | HEIGHT: 64 IN | DIASTOLIC BLOOD PRESSURE: 82 MMHG | RESPIRATION RATE: 16 BRPM

## 2021-05-14 LAB
CYTO UR: NORMAL
DEPRECATED RDW RBC AUTO: 46.7 FL (ref 37–54)
ERYTHROCYTE [DISTWIDTH] IN BLOOD BY AUTOMATED COUNT: 13.1 % (ref 12.3–15.4)
HCT VFR BLD AUTO: 40.8 % (ref 34–46.6)
HGB BLD-MCNC: 13.7 G/DL (ref 12–15.9)
INR PPP: 1.63 (ref 0.9–1.1)
LAB AP CASE REPORT: NORMAL
LAB AP DIAGNOSIS COMMENT: NORMAL
MCH RBC QN AUTO: 33 PG (ref 26.6–33)
MCHC RBC AUTO-ENTMCNC: 33.6 G/DL (ref 31.5–35.7)
MCV RBC AUTO: 98.3 FL (ref 79–97)
PATH REPORT.FINAL DX SPEC: NORMAL
PATH REPORT.GROSS SPEC: NORMAL
PLATELET # BLD AUTO: 181 10*3/MM3 (ref 140–450)
PMV BLD AUTO: 8.9 FL (ref 6–12)
PROTHROMBIN TIME: 19.1 SECONDS (ref 11.7–14.2)
RBC # BLD AUTO: 4.15 10*6/MM3 (ref 3.77–5.28)
UREASE TISS QL: NEGATIVE
WBC # BLD AUTO: 3.73 10*3/MM3 (ref 3.4–10.8)

## 2021-05-14 PROCEDURE — G0378 HOSPITAL OBSERVATION PER HR: HCPCS

## 2021-05-14 PROCEDURE — 85027 COMPLETE CBC AUTOMATED: CPT | Performed by: INTERNAL MEDICINE

## 2021-05-14 PROCEDURE — 85610 PROTHROMBIN TIME: CPT | Performed by: INTERNAL MEDICINE

## 2021-05-14 RX ORDER — SUCRALFATE 1 G/1
1 TABLET ORAL
Qty: 120 TABLET | Refills: 3 | Status: SHIPPED | OUTPATIENT
Start: 2021-05-14 | End: 2021-06-14

## 2021-05-14 RX ORDER — PANTOPRAZOLE SODIUM 40 MG/1
40 TABLET, DELAYED RELEASE ORAL 2 TIMES DAILY
Qty: 60 TABLET | Refills: 3 | Status: SHIPPED | OUTPATIENT
Start: 2021-05-14 | End: 2021-07-29

## 2021-05-14 RX ADMIN — SODIUM CHLORIDE, POTASSIUM CHLORIDE, SODIUM LACTATE AND CALCIUM CHLORIDE 50 ML/HR: 600; 310; 30; 20 INJECTION, SOLUTION INTRAVENOUS at 07:14

## 2021-05-14 RX ADMIN — SUCRALFATE 1 G: 1 TABLET ORAL at 07:12

## 2021-05-14 RX ADMIN — DOCUSATE SODIUM 100 MG: 100 CAPSULE, LIQUID FILLED ORAL at 08:15

## 2021-05-14 RX ADMIN — SUCRALFATE 1 G: 1 TABLET ORAL at 11:09

## 2021-05-14 RX ADMIN — MESALAMINE 1.5 G: 0.38 CAPSULE, EXTENDED RELEASE ORAL at 08:15

## 2021-05-14 RX ADMIN — Medication 1000 MCG: at 08:15

## 2021-05-14 RX ADMIN — PANTOPRAZOLE SODIUM 40 MG: 40 INJECTION, POWDER, FOR SOLUTION INTRAVENOUS at 08:16

## 2021-05-14 NOTE — OUTREACH NOTE
Prep Survey      Responses   [facility] patient discharged from?  [place]   Is LACE score < 7 ?  No   Emergency Room discharge w/ pulse ox?  No   Eligibility  [facility]   Date of Admission  05/09/21   Date of Discharge  05/14/21   Discharge Disposition  Home-Health Care [facility] [place] [facility]   Discharge diagnosis  Hemorrhagic gastritis   Does the patient have one of the following disease processes/diagnoses(primary or secondary)?  Other   Does the patient have Home health ordered?  Yes   What is the Home health agency?   [facility]    Is there a DME ordered?  No   Prep survey completed?  Yes          Lawanda Peters RN

## 2021-05-17 ENCOUNTER — TRANSITIONAL CARE MANAGEMENT TELEPHONE ENCOUNTER (OUTPATIENT)
Dept: CALL CENTER | Facility: HOSPITAL | Age: 86
End: 2021-05-17

## 2021-05-17 NOTE — OUTREACH NOTE
Call Center TCM Note      Responses   [facility] patient discharged from?  [place]   Does the patient have one of the following disease processes/diagnoses(primary or secondary)?  Other   TCM attempt successful?  Yes   Discharge diagnosis  Hemorrhagic gastritis   Is patient permission given to speak with other caregiver?  Yes   Person spoke with today (if not patient) and relationship  Amina, herbie   Meds reviewed with patient/caregiver?  Yes   Is the patient having any side effects they believe may be caused by any medication additions or changes?  No   Does the patient have all medications ordered at discharge?  Yes   Is the patient taking all medications as directed (includes completed medication regime)?  Yes   Does the patient have a primary care provider?   Yes   Does the patient have an appointment with their PCP within 7 days of discharge?  Yes   Comments regarding PCP  TCM [facility] is sched as VIDEO VISIT with Dr Esparza 05/18/2021   Has the patient kept scheduled appointments due by today?  Yes   What is the Home health agency?   [facility] [place]    Has home health visited the patient within 72 hours of discharge?  Yes   Psychosocial issues?  No   Did the patient receive a copy of their discharge instructions?  Yes   Nursing interventions  Reviewed instructions with patient   What is the patient's perception of their health status since discharge?  Improving   Is the patient/caregiver able to teach back signs and symptoms related to disease process for when to call PCP?  Yes   Is the patient/caregiver able to teach back signs and symptoms related to disease process for when to call 911?  Yes   Is the patient/caregiver able to teach back the hierarchy of who to call/visit for symptoms/problems? PCP, Specialist, Home health nurse, Urgent Care, ED, 911  Yes   If the patient is a current smoker, are they able to teach back resources for cessation?  Not a smoker   TCM call completed?  Yes   Wrap up  additional comments  I spoke with pt dtr Amina and pt present in room. Pt is better from GI standpoint. Abdominal pain i simproved at this time. Pt is having alot more joint pain/stiffness being off Prednisone. TCM [facility] is sched as VIDEO VISIT with Dr Esparza 05/18/2021, pt does need referral to Rheumatologist and hopes Dr Esparza ofc can assist with refferral and soonest appt for this.           Janice Oakes MA    5/17/2021, 10:16 EDT

## 2021-05-18 ENCOUNTER — TELEMEDICINE (OUTPATIENT)
Dept: FAMILY MEDICINE CLINIC | Facility: CLINIC | Age: 86
End: 2021-05-18

## 2021-05-18 DIAGNOSIS — E83.42 HYPOMAGNESEMIA: ICD-10-CM

## 2021-05-18 DIAGNOSIS — K26.9 DUODENAL ULCER: ICD-10-CM

## 2021-05-18 DIAGNOSIS — E21.3 HYPERPARATHYROIDISM (HCC): ICD-10-CM

## 2021-05-18 DIAGNOSIS — Z09 HOSPITAL DISCHARGE FOLLOW-UP: Primary | ICD-10-CM

## 2021-05-18 DIAGNOSIS — K29.01 OTHER ACUTE GASTRITIS WITH HEMORRHAGE: ICD-10-CM

## 2021-05-18 PROCEDURE — 99214 OFFICE O/P EST MOD 30 MIN: CPT | Performed by: FAMILY MEDICINE

## 2021-05-18 NOTE — PROGRESS NOTES
Transitional Care Follow Up Visit  Subjective      Video call.  Good A/V connection.  Patient gave verbal consent for treatment.    Brittany Villeda is a 86 y.o. female who presents for a transitional care management visit.    Within 48 business hours after discharge our office contacted her via telephone to coordinate her care and needs.      I reviewed and discussed the details of that call along with the discharge summary, hospital problems, inpatient lab results, inpatient diagnostic studies, and consultation reports with Brittany.     Current outpatient and discharge medications have been reconciled for the patient.  Reviewed by: Mega Esparza MD      Date of TCM Phone Call 5/14/2021   [facility]   Date of Admission 5/9/2021   Date of Discharge 5/14/2021   Discharge Disposition Home-Health Care [facility]     Risk for Readmission (LACE) Score: 11 (5/14/2021  6:01 AM)      History of Present Illness     Course During Hospital Stay: The patient was admitted to the hospital for abdominal pain on warfarin, prednisone for PMR, and previously stopped PPI because of hypomagnesemia.  There was concern that she might have choledocholithiasis.  Previous gallbladder surgery.  However was seen by GI and this was thought not to be the case.  CT chest revealed no evidence of PE or dissection.  Her Covid and respiratory panel was negative.  GI did not feel an MRCP or ERCP was warranted.  They suspected peptic ulcer disease.  A few weeks back she was admitted for lower extremity discomfort.  Thought to have a neuropathy-like syndrome.  She was placed on prednisone which made her symptoms meltaway.  She had hypomagnesemia, her PPI was stopped.  She had a relatively low calcium and low ionized calcium level with a high parathyroid hormone level, found to be up to 225 as an outpatient.  An EGD at this most recent hospitalization revealed hemorrhagic gastritis and a small duodenal ulcer.  No active bleeding.   Later pathology revealed no malignancy, no H. pylori.  She was started on Protonix.  Her steroids were stopped.  She is thought to have polymyalgia rheumatica.  She was referred to rheumatologist.  She also seen an endocrinologist prior to the hospital admission.  Her warfarin was resumed and is monitored by cardiology.    Since the hospital stay she is not getting out of bed much.  She does have physical therapy coming.  They restarted the warfarin.  INR will be done tomorrow through cardiology.  Off prednisone.  Taking the PPI twice a day.  No abdominal pain.  Only fair appetite.  She is getting round-the-clock care from her family.     The following portions of the patient's history were reviewed and updated as appropriate: allergies, current medications, past family history, past medical history, past social history, past surgical history and problem list.    Review of Systems    Objective   Physical Exam  HENT:      Head: Atraumatic.   Pulmonary:      Effort: Pulmonary effort is normal. No respiratory distress.   Musculoskeletal:      Cervical back: Normal range of motion.   Skin:     Coloration: Skin is not pale.   Neurological:      Mental Status: She is alert and oriented to person, place, and time.      Coordination: Coordination normal.   Psychiatric:         Behavior: Behavior normal.         Assessment/Plan   Diagnoses and all orders for this visit:    1. Hospital discharge follow-up (Primary)    2. Other acute gastritis with hemorrhage    3. Duodenal ulcer    4. Hyperparathyroidism (CMS/[facility])    5. Hypomagnesemia    Other orders  -     magnesium gluconate (MAGONATE) 30 MG tablet; Take 1 tablet by mouth Daily.  Dispense: 90 tablet; Refill: 1      Hospital discharge follow-up.  Medication has been reconciled.    Acute gastritis, duodenal ulcer, resolved.  She is to stay off prednisone for the possible PMR.  She will continue the PPI twice a day for least a couple weeks.  The combination of the prednisone  and the warfarin use likely led to the duodenal ulcer/hemorrhagic gastritis.    She will follow up with her endocrinologist for subclinical hyperparathyroidism.  Her calcium was normal at most recent hospitalization, last check 5 days ago.    Limited mobility.  Continue physical therapy.    Probable osteoarthritis of multiple joints.  This is likely confounding the diagnosis of PMR.     I will see the patient in about 1 month.    Duration of visit greater than 30 minutes with the majority face-to-face contact via video call.

## 2021-05-18 NOTE — PROGRESS NOTES
Chief Complaint  No chief complaint on file.    Subjective     {Problem List  Visit Diagnosis   Encounters  Notes  Medications  Labs  Result Review Imaging  Media :23}     Brittany Villeda presents to [facility]  History of Present Illness    Objective   Vital Signs:   There were no vitals taken for this visit.    Physical Exam   Result Review :{Labs  Result Review  Imaging  Med Tab  Media  Procedures :23}   {The following data was reviewed by (Optional):94906}  {Ambulatory Labs (Optional):44054}  {Data reviewed (Optional):80756:::1}          Assessment and Plan {CC Problem List  Visit Diagnosis   ROS  Review (Popup)  Health Maintenance  Quality  BestPractice  Medications  SmartSets  SnapShot Encounters  Media :23}   There are no diagnoses linked to this encounter.  {Time Spent (Optional):35504}  Follow Up {Instructions Charge Capture  Follow-up Communications :23}  No follow-ups on file.  Patient was given instructions and counseling regarding her condition or for health maintenance advice. Please see specific information pulled into the AVS if appropriate.

## 2021-05-19 ENCOUNTER — ANTICOAGULATION VISIT (OUTPATIENT)
Dept: PHARMACY | Facility: HOSPITAL | Age: 86
End: 2021-05-19

## 2021-05-19 ENCOUNTER — TELEPHONE (OUTPATIENT)
Dept: FAMILY MEDICINE CLINIC | Facility: CLINIC | Age: 86
End: 2021-05-19

## 2021-05-19 LAB — INR PPP: 1.2

## 2021-05-19 NOTE — PROGRESS NOTES
Anticoagulation Clinic Progress Note    Anticoagulation Summary  As of 2021    INR goal:  2.0-3.0   TTR:  69.2 % (2.6 y)   INR used for dosin.20 (2021)   Warfarin maintenance plan:  5 mg every Mon, Thu, Sat; 2.5 mg all other days   Weekly warfarin total:  25 mg   Plan last modified:  Rusty Interiano [facility] (2021)   Next INR check:  2021   Priority:  Maintenance   Target end date:  Indefinite    Indications    Atrial fibrillation (CMS/HCC) [I48.91]             Anticoagulation Episode Summary     INR check location:      Preferred lab:      Send INR reminders to:   [facility] [place]    Comments:        Anticoagulation Care Providers     Provider Role Specialty Phone number    Mary Grace Culp MD Referring Cardiology [phone]          Clinic Interview:      INR History:  Anticoagulation Monitoring 2021 5/3/2021 2021   INR 2.70 2.70 1.20   INR Date 2021 5/3/2021 2021   INR Goal 2.0-3.0 2.0-3.0 2.0-3.0   Trend Same Same Same   Last Week Total 22.5 mg 22.5 mg 25 mg   Next Week Total 22.5 mg 22.5 mg 30 mg   Sun 2.5 mg 2.5 mg -   Mon 5 mg 5 mg -   Tue 2.5 mg 2.5 mg -   Wed 2.5 mg 2.5 mg 7.5 mg ()   Thu 2.5 mg () 2.5 mg () 5 mg   Fri 2.5 mg 2.5 mg -   Sat 5 mg 5 mg -   Visit Report - - -   Some recent data might be hidden       Plan:  1. INR is Subtherapeutic today- see above in Anticoagulation Summary.   Will instruct Brittany DELROY Shaferg to give 7.5 mg of warfarin today and 5 mg of warfarin on - see above in Anticoagulation Summary.  2. Follow up in 2 days  3.  Pt has agreed to only be called if INR out of range. They have been instructed to call if any changes in medications, doses, concerns, etc. Patient expresses understanding and has no further questions at this time.    Shmuel Mccracken [facility]

## 2021-05-19 NOTE — TELEPHONE ENCOUNTER
Aminta with [facility]  [phone] called with the pts inr and pt. The patients INR is 1.2 and PT is 14.6. Aminta stated that nursing will ne seeing the pt for home health now due to her being discharged.

## 2021-05-20 NOTE — TELEPHONE ENCOUNTER
My colleague, Shmuel, contacted the home health nurse yesterday regarding her INR and warfarin instructions. Thank you for double checking!

## 2021-05-21 ENCOUNTER — ANTICOAGULATION VISIT (OUTPATIENT)
Dept: PHARMACY | Facility: HOSPITAL | Age: 86
End: 2021-05-21

## 2021-05-21 LAB — INR PPP: 1.3

## 2021-05-21 NOTE — PROGRESS NOTES
Anticoagulation Clinic Progress Note    Anticoagulation Summary  As of 2021    INR goal:  2.0-3.0   TTR:  69.1 % (2.6 y)   INR used for dosin.30 (2021)   Warfarin maintenance plan:  5 mg every Mon, Thu, Sat; 2.5 mg all other days   Weekly warfarin total:  25 mg   Plan last modified:  Rusty Interiano RP (2021)   Next INR check:  2021   Priority:  Maintenance   Target end date:  Indefinite    Indications    Atrial fibrillation (CMS/HCC) [I48.91]             Anticoagulation Episode Summary     INR check location:      Preferred lab:      Send INR reminders to:  [facility] [place]    Comments:        Anticoagulation Care Providers     Provider Role Specialty Phone number    Mary Grace Culp MD Referring Cardiology [phone]          INR History:  Anticoagulation Monitoring 5/3/2021 2021 2021   INR 2.70 1.20 1.30   INR Date 5/3/2021 2021 2021   INR Goal 2.0-3.0 2.0-3.0 2.0-3.0   Trend Same Same Same   Last Week Total 22.5 mg 25 mg 30 mg   Next Week Total 22.5 mg 30 mg 32.5 mg   Sun 2.5 mg - 5 mg ()   Mon 5 mg - -   Tue 2.5 mg - -   Wed 2.5 mg 7.5 mg () -   Thu 2.5 mg () 5 mg -   Fri 2.5 mg - 7.5 mg ()   Sat 5 mg - 5 mg   Visit Report - - -   Some recent data might be hidden       Plan:  1. INR is Subtherapeutic today- see above in Anticoagulation Summary.   Provided instructions to Nicole with [facility] ([phone]) to Change their warfarin regimen- see above in Anticoagulation Summary.  2. Follow up in 3 days      Michael Reyna [facility]

## 2021-05-22 ENCOUNTER — READMISSION MANAGEMENT (OUTPATIENT)
Dept: CALL CENTER | Facility: HOSPITAL | Age: 86
End: 2021-05-22

## 2021-05-22 NOTE — OUTREACH NOTE
Medical Week 2 Survey      Responses   [facility] patient discharged from?  [place]   Does the patient have one of the following disease processes/diagnoses(primary or secondary)?  Other   Week 2 attempt successful?  Yes   Call start time  0936   Discharge diagnosis  Hemorrhagic gastritis   Call end time  0938   Is patient permission given to speak with other caregiver?  Yes   Person spoke with today (if not patient) and relationship  Amina, herbie   Meds reviewed with patient/caregiver?  Yes   Is the patient having any side effects they believe may be caused by any medication additions or changes?  No   Does the patient have all medications ordered at discharge?  Yes   Is the patient taking all medications as directed (includes completed medication regime)?  Yes   Does the patient have a primary care provider?   Yes   Does the patient have an appointment with their PCP within 7 days of discharge?  Yes   Comments regarding PCP  Had a video visit on 05/18/21   Has the patient kept scheduled appointments due by today?  Yes   What is the Home health agency?   [facility]    Has home health visited the patient within 72 hours of discharge?  Yes   Did the patient receive a copy of their discharge instructions?  Yes   Nursing interventions  Reviewed instructions with patient   What is the patient's perception of their health status since discharge?  Improving   Is the patient/caregiver able to teach back signs and symptoms related to disease process for when to call PCP?  Yes   Is the patient/caregiver able to teach back signs and symptoms related to disease process for when to call 911?  Yes   Is the patient/caregiver able to teach back the hierarchy of who to call/visit for symptoms/problems? PCP, Specialist, Home health nurse, Urgent Care, ED, 911  Yes   If the patient is a current smoker, are they able to teach back resources for cessation?  Not a smoker   Additional teach back comments  Mary  states she is doing better   Week 2 Call Completed?  Yes          Janice Padilla RN

## 2021-05-25 ENCOUNTER — ANTICOAGULATION VISIT (OUTPATIENT)
Dept: PHARMACY | Facility: HOSPITAL | Age: 86
End: 2021-05-25

## 2021-05-25 LAB — INR PPP: 2.7

## 2021-05-28 ENCOUNTER — ANTICOAGULATION VISIT (OUTPATIENT)
Dept: PHARMACY | Facility: HOSPITAL | Age: 86
End: 2021-05-28

## 2021-05-28 ENCOUNTER — TELEPHONE (OUTPATIENT)
Dept: CARDIOLOGY | Facility: CLINIC | Age: 86
End: 2021-05-28

## 2021-05-28 LAB — INR PPP: 3.4

## 2021-05-28 NOTE — PROGRESS NOTES
Anticoagulation Clinic Progress Note    Anticoagulation Summary  As of 5/28/2021    INR goal:  2.0-3.0   TTR:  68.9 % (2.6 y)   INR used for dosing:  3.40 (5/28/2021)   Warfarin maintenance plan:  5 mg every Mon, Thu, Sat; 2.5 mg all other days   Weekly warfarin total:  25 mg   Plan last modified:  Rusty Interiano RPH (2/23/2021)   Next INR check:  6/2/2021   Priority:  Maintenance   Target end date:  Indefinite    Indications    Atrial fibrillation (CMS/HCC) [I48.91]             Anticoagulation Episode Summary     INR check location:      Preferred lab:      Send INR reminders to:  [facility] [place]    Comments:        Anticoagulation Care Providers     Provider Role Specialty Phone number    Mary Grace Culp MD Referring Cardiology [phone]          INR History:  Anticoagulation Monitoring 5/21/2021 5/25/2021 5/28/2021   INR 1.30 2.70 3.40   INR Date 5/21/2021 5/25/2021 5/28/2021   INR Goal 2.0-3.0 2.0-3.0 2.0-3.0   Trend Same Same Same   Last Week Total 30 mg 37.5 mg 32.5 mg   Next Week Total 32.5 mg 25 mg 20 mg   Sun 5 mg (5/23) - 2.5 mg   Mon - - 5 mg   Tue - 2.5 mg 2.5 mg   Wed - 2.5 mg -   Thu - 5 mg -   Fri 7.5 mg (5/21) - Hold (5/28)   Sat 5 mg - 2.5 mg (5/29)   Visit Report - - -   Some recent data might be hidden       Plan:  1. INR is Supratherapeutic today- see above in Anticoagulation Summary.   Provided instructions to Maria Antonia [facility] to Change their warfarin regimen- see above in Anticoagulation Summary.  2. Follow up in 5 days      Rusty Interiano RPH

## 2021-06-02 ENCOUNTER — ANTICOAGULATION VISIT (OUTPATIENT)
Dept: PHARMACY | Facility: HOSPITAL | Age: 86
End: 2021-06-02

## 2021-06-02 LAB — INR PPP: 2.3

## 2021-06-02 NOTE — PROGRESS NOTES
Anticoagulation Clinic Progress Note    Anticoagulation Summary  As of 2021    INR goal:  2.0-3.0   TTR:  68.9 % (2.7 y)   INR used for dosin.30 (2021)   Warfarin maintenance plan:  5 mg every Mon, Thu, Sat; 2.5 mg all other days   Weekly warfarin total:  25 mg   No change documented:  Rusty Interiano RPH   Plan last modified:  Rusty Interiano RPH (2021)   Next INR check:  2021   Priority:  Maintenance   Target end date:  Indefinite    Indications    Atrial fibrillation (CMS/HCC) [I48.91]             Anticoagulation Episode Summary     INR check location:      Preferred lab:      Send INR reminders to:   [facility] [place]    Comments:        Anticoagulation Care Providers     Provider Role Specialty Phone number    Mary Grace Culp MD Referring Cardiology [phone]          INR History:  Anticoagulation Monitoring 2021   INR 2.70 3.40 2.30   INR Date 2021   INR Goal 2.0-3.0 2.0-3.0 2.0-3.0   Trend Same Same Same   Last Week Total 37.5 mg 32.5 mg 20 mg   Next Week Total 25 mg 20 mg 25 mg   Sun - 2.5 mg 2.5 mg   Mon - 5 mg -   Tue 2.5 mg 2.5 mg -   Wed 2.5 mg - 2.5 mg   Thu 5 mg - 5 mg   Fri - Hold () 2.5 mg   Sat - 2.5 mg () 5 mg   Visit Report - - -   Some recent data might be hidden       Plan:  1. INR is Therapeutic today- see above in Anticoagulation Summary.   Provided instructions to [facility] to Continue their warfarin regimen- see above in Anticoagulation Summary.  2. Follow up in 5 days      Rusty Interiano RPH

## 2021-06-03 ENCOUNTER — TELEPHONE (OUTPATIENT)
Dept: FAMILY MEDICINE CLINIC | Facility: CLINIC | Age: 86
End: 2021-06-03

## 2021-06-03 NOTE — TELEPHONE ENCOUNTER
Jennifer Russo PT called in today. When her and the patients daughter where doing PT with patient today in the hallway. They where walking back to sit down from the hallway PT when the pt's left knee gave out and the patients daughter and PT helped guide her to the ground where then her son was called to help get her back up. The son arrived and helped get the patient up and PT was just calling to report of incident and that pt's left knee is sore from the way it was bent and being bent more then it was used to. If you have any questions the PT can be reached at [phone].

## 2021-06-08 ENCOUNTER — ANTICOAGULATION VISIT (OUTPATIENT)
Dept: PHARMACY | Facility: HOSPITAL | Age: 86
End: 2021-06-08

## 2021-06-08 LAB — INR PPP: 3.9

## 2021-06-08 NOTE — PROGRESS NOTES
Anticoagulation Clinic Progress Note    Anticoagulation Summary  As of 6/8/2021    INR goal:  2.0-3.0   TTR:  68.7 % (2.7 y)   INR used for dosing:  3.90 (6/8/2021)   Warfarin maintenance plan:  5 mg every Mon, Thu; 2.5 mg all other days   Weekly warfarin total:  22.5 mg   Plan last modified:  Rusty Interiano RPH (6/8/2021)   Next INR check:  6/15/2021   Priority:  Maintenance   Target end date:  Indefinite    Indications    Atrial fibrillation (CMS/HCC) [I48.91]             Anticoagulation Episode Summary     INR check location:      Preferred lab:      Send INR reminders to:  [facility] POOL    Comments:        Anticoagulation Care Providers     Provider Role Specialty Phone number    Mary Grace Culp MD Referring Cardiology [phone]          INR History:  Anticoagulation Monitoring 5/28/2021 6/2/2021 6/8/2021   INR 3.40 2.30 3.90   INR Date 5/28/2021 6/2/2021 6/8/2021   INR Goal 2.0-3.0 2.0-3.0 2.0-3.0   Trend Same Same Down   Last Week Total 32.5 mg 20 mg 25 mg   Next Week Total 20 mg 25 mg 20 mg   Sun 2.5 mg 2.5 mg 2.5 mg   Mon 5 mg - 5 mg   Tue 2.5 mg - Hold (6/8)   Wed - 2.5 mg 2.5 mg   Thu - 5 mg 5 mg   Fri Hold (5/28) 2.5 mg 2.5 mg   Sat 2.5 mg (5/29) 5 mg 2.5 mg   Visit Report - - -   Some recent data might be hidden       Plan:  1. INR is Supratherapeutic today- see above in Anticoagulation Summary.   Provided instructions to Daria with [facility] to Change their warfarin regimen- see above in Anticoagulation Summary.  2. Follow up in 1 week      Rusty Interiano RPH

## 2021-06-14 ENCOUNTER — TELEMEDICINE (OUTPATIENT)
Dept: FAMILY MEDICINE CLINIC | Facility: CLINIC | Age: 86
End: 2021-06-14

## 2021-06-14 DIAGNOSIS — K26.9 DUODENAL ULCER: ICD-10-CM

## 2021-06-14 DIAGNOSIS — I48.91 ATRIAL FIBRILLATION, UNSPECIFIED TYPE (HCC): ICD-10-CM

## 2021-06-14 DIAGNOSIS — I10 ESSENTIAL HYPERTENSION: Primary | ICD-10-CM

## 2021-06-14 PROCEDURE — 99213 OFFICE O/P EST LOW 20 MIN: CPT | Performed by: FAMILY MEDICINE

## 2021-06-14 RX ORDER — SUCRALFATE 1 G/1
1 TABLET ORAL
Qty: 120 TABLET | Refills: 3 | Status: SHIPPED | OUTPATIENT
Start: 2021-06-14 | End: 2021-07-29

## 2021-06-14 NOTE — PROGRESS NOTES
Chief Complaint    Follow-up fall    Subjective          Brittany Villeda presents to [facility]  History of Present Illness     Telehealth video visit.  Excellent A/V quality.  Patient gave verbal consent for video visit today.    Daughter present during the exam also.    She fell during episode of physical therapy on June 2.  She had some pain in her left groin that is now much better.  I did shake her confidence a little bit.  She started to work on getting out of the bed again.  She has no pressure sores.  No constipation.  She has occasional trouble with urination.  No fever.  She otherwise doing well.  Appetite remains good.  Spirits remain good.    Duodenal ulcer.  Osteoarthritis multiple joints.  Probable PMR.  She is off the prednisone since the episode of the duodenal ulcer.  Her pain remains manageable as well.  She is taking Tylenol.    Hypertension.  Recently not needing blood pressure medication.  However blood pressure this morning was 148/72 with a pulse of 72.    Objective   Vital Signs:   There were no vitals taken for this visit.    Physical Exam  HENT:      Head: Atraumatic.   Pulmonary:      Effort: Pulmonary effort is normal. No respiratory distress.   Musculoskeletal:      Cervical back: Normal range of motion.   Skin:     Coloration: Skin is not pale.   Neurological:      Mental Status: She is alert and oriented to person, place, and time.      Coordination: Coordination normal.   Psychiatric:         Behavior: Behavior normal.        Result Review :   The following data was reviewed by: Mega Esparza MD on 06/14/2021:  Common labs    Common Labsle 5/12/21 5/12/21 5/13/21 5/13/21 5/14/21    0431 0431 0509 [id number]    Glucose 72   68    BUN 6 (A)   4 (A)    Creatinine 0.50 (A)   0.51 (A)    eGFR African Am 142   139    Sodium 139   140    Potassium 4.1   3.8    Chloride 107   106    Calcium 9.4   9.6    Albumin 3.10 (A)   3.10 (A)    Total Bilirubin 1.1   1.2     Alkaline Phosphatase 66   71    AST (SGOT) 13   16    ALT (SGPT) 9   9    WBC  3.66 3.64  3.73   Hemoglobin  12.0 13.1  13.7   Hematocrit  35.9 39.6  40.8   Platelets  169 137 (A)  181   (A) Abnormal value       Comments are available for some flowsheets but are not being displayed.                     Assessment and Plan    Diagnoses and all orders for this visit:    1. Essential hypertension (Primary)    2. Atrial fibrillation, unspecified type (CMS/HCC)    3. Duodenal ulcer    Other orders  -     sucralfate (CARAFATE) 1 g tablet; Take 1 tablet by mouth 4 (Four) Times a Day Before Meals & at Bedtime.  Dispense: 120 tablet; Refill: 3      Hypertension.  Blood pressure elevated this morning 148 systolic.  We will continue to monitor.  I will see her back for video visit in 6 weeks.    Recent fall.  No residual injury.  She is really starting her physical therapy.    Osteoarthritis of multiple joints.  PMR.  Symptoms are utilized with Tylenol.    Duodenal ulcer.  She will continue pantoprazole and Carafate..    Duration of visit 15 minutes      Follow Up   No follow-ups on file.  Patient was given instructions and counseling regarding her condition or for health maintenance advice. Please see specific information pulled into the AVS if appropriate.

## 2021-06-15 ENCOUNTER — ANTICOAGULATION VISIT (OUTPATIENT)
Dept: PHARMACY | Facility: HOSPITAL | Age: 86
End: 2021-06-15

## 2021-06-15 LAB — INR PPP: 2.1

## 2021-06-15 NOTE — PROGRESS NOTES
Anticoagulation Clinic Progress Note    Anticoagulation Summary  As of 6/15/2021    INR goal:  2.0-3.0   TTR:  68.6 % (2.7 y)   INR used for dosin.10 (6/15/2021)   Warfarin maintenance plan:  5 mg every Mon, Thu; 2.5 mg all other days   Weekly warfarin total:  22.5 mg   Plan last modified:  Rusty Interiano [facility] (2021)   Next INR check:  2021   Priority:  Maintenance   Target end date:  Indefinite    Indications    Atrial fibrillation (CMS/HCC) [I48.91]             Anticoagulation Episode Summary     INR check location:      Preferred lab:      Send INR reminders to:  [facility] POOL    Comments:        Anticoagulation Care Providers     Provider Role Specialty Phone number    MaryG race Culp MD Referring Cardiology [phone]          INR History:  Anticoagulation Monitoring 2021 2021 6/15/2021   INR 2.30 3.90 2.10   INR Date 2021 2021 6/15/2021   INR Goal 2.0-3.0 2.0-3.0 2.0-3.0   Trend Same Down Same   Last Week Total 20 mg 25 mg 20 mg   Next Week Total 25 mg 20 mg 22.5 mg   Sun 2.5 mg 2.5 mg 2.5 mg   Mon - 5 mg 5 mg   Tue - Hold () 2.5 mg   Wed 2.5 mg 2.5 mg 2.5 mg   Thu 5 mg 5 mg 5 mg   Fri 2.5 mg 2.5 mg 2.5 mg   Sat 5 mg 2.5 mg 2.5 mg   Visit Report - - -   Some recent data might be hidden       Plan:  1. INR is Therapeutic today- see above in Anticoagulation Summary.   Provided instructions to Maria Antonia [facility] to Continue their warfarin regimen- see above in Anticoagulation Summary.  2. Follow up in 1 week      Janice Hart [facility]

## 2021-06-22 ENCOUNTER — APPOINTMENT (OUTPATIENT)
Dept: GENERAL RADIOLOGY | Facility: HOSPITAL | Age: 86
End: 2021-06-22

## 2021-06-22 ENCOUNTER — HOSPITAL ENCOUNTER (OUTPATIENT)
Facility: HOSPITAL | Age: 86
Setting detail: OBSERVATION
Discharge: HOME HEALTH | End: 2021-06-23
Attending: EMERGENCY MEDICINE | Admitting: INTERNAL MEDICINE

## 2021-06-22 DIAGNOSIS — G47.33 OBSTRUCTIVE SLEEP APNEA: ICD-10-CM

## 2021-06-22 DIAGNOSIS — R06.09 EXERTIONAL DYSPNEA: Primary | ICD-10-CM

## 2021-06-22 DIAGNOSIS — I50.9 ACUTE ON CHRONIC CONGESTIVE HEART FAILURE, UNSPECIFIED HEART FAILURE TYPE (HCC): ICD-10-CM

## 2021-06-22 DIAGNOSIS — I27.20 PULMONARY HYPERTENSION (HCC): ICD-10-CM

## 2021-06-22 LAB
ALBUMIN SERPL-MCNC: 3.2 G/DL (ref 3.5–5.2)
ALBUMIN/GLOB SERPL: 0.9 G/DL
ALP SERPL-CCNC: 67 U/L (ref 39–117)
ALT SERPL W P-5'-P-CCNC: 7 U/L (ref 1–33)
ANION GAP SERPL CALCULATED.3IONS-SCNC: 6.4 MMOL/L (ref 5–15)
ARTERIAL PATENCY WRIST A: POSITIVE
AST SERPL-CCNC: 15 U/L (ref 1–32)
ATMOSPHERIC PRESS: 750.1 MMHG
BASE EXCESS BLDA CALC-SCNC: 2.5 MMOL/L (ref 0–2)
BASOPHILS # BLD AUTO: 0.02 10*3/MM3 (ref 0–0.2)
BASOPHILS NFR BLD AUTO: 0.6 % (ref 0–1.5)
BDY SITE: ABNORMAL
BILIRUB SERPL-MCNC: 1.9 MG/DL (ref 0–1.2)
BUN SERPL-MCNC: 6 MG/DL (ref 8–23)
BUN/CREAT SERPL: 10.5 (ref 7–25)
CALCIUM SPEC-SCNC: 9.4 MG/DL (ref 8.6–10.5)
CHLORIDE SERPL-SCNC: 108 MMOL/L (ref 98–107)
CO2 SERPL-SCNC: 28.6 MMOL/L (ref 22–29)
CREAT SERPL-MCNC: 0.57 MG/DL (ref 0.57–1)
DEPRECATED RDW RBC AUTO: 50.6 FL (ref 37–54)
EOSINOPHIL # BLD AUTO: 0.01 10*3/MM3 (ref 0–0.4)
EOSINOPHIL NFR BLD AUTO: 0.3 % (ref 0.3–6.2)
ERYTHROCYTE [DISTWIDTH] IN BLOOD BY AUTOMATED COUNT: 14.1 % (ref 12.3–15.4)
GFR SERPL CREATININE-BSD FRML MDRD: 122 ML/MIN/1.73
GLOBULIN UR ELPH-MCNC: 3.4 GM/DL
GLUCOSE SERPL-MCNC: 124 MG/DL (ref 65–99)
HCO3 BLDA-SCNC: 27 MMOL/L (ref 22–28)
HCT VFR BLD AUTO: 39.3 % (ref 34–46.6)
HGB BLD-MCNC: 12.8 G/DL (ref 12–15.9)
INR PPP: 2.19 (ref 0.9–1.1)
LYMPHOCYTES # BLD AUTO: 1.18 10*3/MM3 (ref 0.7–3.1)
LYMPHOCYTES NFR BLD AUTO: 35.5 % (ref 19.6–45.3)
MAGNESIUM SERPL-MCNC: 1.5 MG/DL (ref 1.6–2.4)
MCH RBC QN AUTO: 31.6 PG (ref 26.6–33)
MCHC RBC AUTO-ENTMCNC: 32.6 G/DL (ref 31.5–35.7)
MCV RBC AUTO: 97 FL (ref 79–97)
MODALITY: ABNORMAL
MONOCYTES # BLD AUTO: 0.29 10*3/MM3 (ref 0.1–0.9)
MONOCYTES NFR BLD AUTO: 8.7 % (ref 5–12)
NEUTROPHILS NFR BLD AUTO: 1.81 10*3/MM3 (ref 1.7–7)
NEUTROPHILS NFR BLD AUTO: 54.6 % (ref 42.7–76)
NT-PROBNP SERPL-MCNC: 1178 PG/ML (ref 0–1800)
PCO2 BLDA: 40.1 MM HG (ref 35–45)
PH BLDA: 7.43 PH UNITS (ref 7.35–7.45)
PLATELET # BLD AUTO: 159 10*3/MM3 (ref 140–450)
PMV BLD AUTO: 9 FL (ref 6–12)
PO2 BLDA: 65 MM HG (ref 80–100)
POTASSIUM SERPL-SCNC: 3.7 MMOL/L (ref 3.5–5.2)
PROT SERPL-MCNC: 6.6 G/DL (ref 6–8.5)
PROTHROMBIN TIME: 24.1 SECONDS (ref 11.7–14.2)
QT INTERVAL: 464 MS
RBC # BLD AUTO: 4.05 10*6/MM3 (ref 3.77–5.28)
SAO2 % BLDCOA: 93.1 % (ref 92–99)
SARS-COV-2 ORF1AB RESP QL NAA+PROBE: NOT DETECTED
SODIUM SERPL-SCNC: 143 MMOL/L (ref 136–145)
TOTAL RATE: 16 BREATHS/MINUTE
TROPONIN T SERPL-MCNC: <0.01 NG/ML (ref 0–0.03)
WBC # BLD AUTO: 3.32 10*3/MM3 (ref 3.4–10.8)

## 2021-06-22 PROCEDURE — G0378 HOSPITAL OBSERVATION PER HR: HCPCS

## 2021-06-22 PROCEDURE — 83735 ASSAY OF MAGNESIUM: CPT | Performed by: EMERGENCY MEDICINE

## 2021-06-22 PROCEDURE — 25010000002 FUROSEMIDE PER 20 MG: Performed by: EMERGENCY MEDICINE

## 2021-06-22 PROCEDURE — 93010 ELECTROCARDIOGRAM REPORT: CPT | Performed by: INTERNAL MEDICINE

## 2021-06-22 PROCEDURE — 83880 ASSAY OF NATRIURETIC PEPTIDE: CPT | Performed by: EMERGENCY MEDICINE

## 2021-06-22 PROCEDURE — 84484 ASSAY OF TROPONIN QUANT: CPT | Performed by: EMERGENCY MEDICINE

## 2021-06-22 PROCEDURE — 80053 COMPREHEN METABOLIC PANEL: CPT | Performed by: EMERGENCY MEDICINE

## 2021-06-22 PROCEDURE — 36600 WITHDRAWAL OF ARTERIAL BLOOD: CPT

## 2021-06-22 PROCEDURE — 96374 THER/PROPH/DIAG INJ IV PUSH: CPT

## 2021-06-22 PROCEDURE — 25010000002 FUROSEMIDE PER 20 MG: Performed by: INTERNAL MEDICINE

## 2021-06-22 PROCEDURE — 85025 COMPLETE CBC W/AUTO DIFF WBC: CPT | Performed by: EMERGENCY MEDICINE

## 2021-06-22 PROCEDURE — C9803 HOPD COVID-19 SPEC COLLECT: HCPCS

## 2021-06-22 PROCEDURE — 71045 X-RAY EXAM CHEST 1 VIEW: CPT

## 2021-06-22 PROCEDURE — 82803 BLOOD GASES ANY COMBINATION: CPT

## 2021-06-22 PROCEDURE — 96376 TX/PRO/DX INJ SAME DRUG ADON: CPT

## 2021-06-22 PROCEDURE — 93005 ELECTROCARDIOGRAM TRACING: CPT | Performed by: EMERGENCY MEDICINE

## 2021-06-22 PROCEDURE — U0004 COV-19 TEST NON-CDC HGH THRU: HCPCS | Performed by: EMERGENCY MEDICINE

## 2021-06-22 PROCEDURE — 99285 EMERGENCY DEPT VISIT HI MDM: CPT

## 2021-06-22 PROCEDURE — 85610 PROTHROMBIN TIME: CPT | Performed by: EMERGENCY MEDICINE

## 2021-06-22 RX ORDER — CHOLECALCIFEROL (VITAMIN D3) 125 MCG
1000 CAPSULE ORAL DAILY
Status: DISCONTINUED | OUTPATIENT
Start: 2021-06-22 | End: 2021-06-23 | Stop reason: HOSPADM

## 2021-06-22 RX ORDER — ONDANSETRON 2 MG/ML
4 INJECTION INTRAMUSCULAR; INTRAVENOUS EVERY 6 HOURS PRN
Status: DISCONTINUED | OUTPATIENT
Start: 2021-06-22 | End: 2021-06-23 | Stop reason: HOSPADM

## 2021-06-22 RX ORDER — WARFARIN SODIUM 2.5 MG/1
2.5 TABLET ORAL
Status: DISCONTINUED | OUTPATIENT
Start: 2021-06-22 | End: 2021-06-23 | Stop reason: HOSPADM

## 2021-06-22 RX ORDER — SODIUM CHLORIDE 0.9 % (FLUSH) 0.9 %
10 SYRINGE (ML) INJECTION EVERY 12 HOURS SCHEDULED
Status: DISCONTINUED | OUTPATIENT
Start: 2021-06-22 | End: 2021-06-23 | Stop reason: HOSPADM

## 2021-06-22 RX ORDER — FUROSEMIDE 10 MG/ML
40 INJECTION INTRAMUSCULAR; INTRAVENOUS
Status: DISCONTINUED | OUTPATIENT
Start: 2021-06-22 | End: 2021-06-23

## 2021-06-22 RX ORDER — ACETAMINOPHEN 650 MG/1
650 SUPPOSITORY RECTAL EVERY 4 HOURS PRN
Status: DISCONTINUED | OUTPATIENT
Start: 2021-06-22 | End: 2021-06-23 | Stop reason: HOSPADM

## 2021-06-22 RX ORDER — SODIUM CHLORIDE 0.9 % (FLUSH) 0.9 %
10 SYRINGE (ML) INJECTION AS NEEDED
Status: DISCONTINUED | OUTPATIENT
Start: 2021-06-22 | End: 2021-06-23 | Stop reason: HOSPADM

## 2021-06-22 RX ORDER — PANTOPRAZOLE SODIUM 40 MG/1
40 TABLET, DELAYED RELEASE ORAL 2 TIMES DAILY
Status: DISCONTINUED | OUTPATIENT
Start: 2021-06-22 | End: 2021-06-23 | Stop reason: HOSPADM

## 2021-06-22 RX ORDER — ACETAMINOPHEN 325 MG/1
650 TABLET ORAL EVERY 4 HOURS PRN
Status: DISCONTINUED | OUTPATIENT
Start: 2021-06-22 | End: 2021-06-23 | Stop reason: HOSPADM

## 2021-06-22 RX ORDER — WHEAT DEXTRIN 3 G/4 G
1 POWDER IN PACKET (EA) ORAL DAILY
Status: DISCONTINUED | OUTPATIENT
Start: 2021-06-22 | End: 2021-06-23 | Stop reason: HOSPADM

## 2021-06-22 RX ORDER — ALBUTEROL SULFATE 2.5 MG/3ML
2.5 SOLUTION RESPIRATORY (INHALATION) EVERY 4 HOURS PRN
Status: DISCONTINUED | OUTPATIENT
Start: 2021-06-22 | End: 2021-06-23 | Stop reason: HOSPADM

## 2021-06-22 RX ORDER — WARFARIN SODIUM 5 MG/1
5 TABLET ORAL
Status: DISCONTINUED | OUTPATIENT
Start: 2021-06-24 | End: 2021-06-23 | Stop reason: HOSPADM

## 2021-06-22 RX ORDER — WARFARIN SODIUM 1 MG/1
1 TABLET ORAL 2 TIMES WEEKLY
Status: DISCONTINUED | OUTPATIENT
Start: 2021-06-24 | End: 2021-06-22

## 2021-06-22 RX ORDER — WARFARIN SODIUM 2.5 MG/1
2.5 TABLET ORAL
Status: DISCONTINUED | OUTPATIENT
Start: 2021-06-22 | End: 2021-06-22

## 2021-06-22 RX ORDER — UREA 10 %
30 LOTION (ML) TOPICAL DAILY
Status: DISCONTINUED | OUTPATIENT
Start: 2021-06-22 | End: 2021-06-23 | Stop reason: HOSPADM

## 2021-06-22 RX ORDER — ALBUTEROL SULFATE 90 UG/1
2 AEROSOL, METERED RESPIRATORY (INHALATION) EVERY 4 HOURS PRN
Status: DISCONTINUED | OUTPATIENT
Start: 2021-06-22 | End: 2021-06-22 | Stop reason: CLARIF

## 2021-06-22 RX ORDER — MESALAMINE 0.38 G/1
1.5 CAPSULE, EXTENDED RELEASE ORAL DAILY
Status: DISCONTINUED | OUTPATIENT
Start: 2021-06-22 | End: 2021-06-23 | Stop reason: HOSPADM

## 2021-06-22 RX ORDER — ACETAMINOPHEN 500 MG
500 TABLET ORAL EVERY 6 HOURS PRN
Status: DISCONTINUED | OUTPATIENT
Start: 2021-06-22 | End: 2021-06-23 | Stop reason: HOSPADM

## 2021-06-22 RX ORDER — FUROSEMIDE 10 MG/ML
40 INJECTION INTRAMUSCULAR; INTRAVENOUS ONCE
Status: COMPLETED | OUTPATIENT
Start: 2021-06-22 | End: 2021-06-22

## 2021-06-22 RX ORDER — NITROGLYCERIN 0.4 MG/1
0.4 TABLET SUBLINGUAL
Status: DISCONTINUED | OUTPATIENT
Start: 2021-06-22 | End: 2021-06-23 | Stop reason: HOSPADM

## 2021-06-22 RX ORDER — DOCUSATE SODIUM 100 MG/1
100 CAPSULE, LIQUID FILLED ORAL NIGHTLY
Status: DISCONTINUED | OUTPATIENT
Start: 2021-06-22 | End: 2021-06-23 | Stop reason: HOSPADM

## 2021-06-22 RX ORDER — SUCRALFATE 1 G/1
1 TABLET ORAL
Status: DISCONTINUED | OUTPATIENT
Start: 2021-06-22 | End: 2021-06-23 | Stop reason: HOSPADM

## 2021-06-22 RX ORDER — ACETAMINOPHEN 160 MG/5ML
650 SOLUTION ORAL EVERY 4 HOURS PRN
Status: DISCONTINUED | OUTPATIENT
Start: 2021-06-22 | End: 2021-06-23 | Stop reason: HOSPADM

## 2021-06-22 RX ADMIN — FUROSEMIDE 40 MG: 10 INJECTION, SOLUTION INTRAVENOUS at 22:16

## 2021-06-22 RX ADMIN — FUROSEMIDE 40 MG: 10 INJECTION, SOLUTION INTRAMUSCULAR; INTRAVENOUS at 15:57

## 2021-06-22 RX ADMIN — WARFARIN 2.5 MG: 2.5 TABLET ORAL at 22:17

## 2021-06-22 RX ADMIN — PANTOPRAZOLE SODIUM 40 MG: 40 TABLET, DELAYED RELEASE ORAL at 22:16

## 2021-06-22 RX ADMIN — SUCRALFATE 1 G: 1 TABLET ORAL at 22:16

## 2021-06-22 RX ADMIN — SODIUM CHLORIDE, PRESERVATIVE FREE 10 ML: 5 INJECTION INTRAVENOUS at 22:20

## 2021-06-22 RX ADMIN — Medication 27 MG: at 22:16

## 2021-06-22 NOTE — H&P
Internal medicine history and physical  INTERNAL MEDICINE   [facility]       Patient Identification:  Name: Brittany Villeda  Age: 86 y.o.  Sex: female  :  1935  MRN: [id number]                   Primary Care Physician: Mega Esparza MD                               Date of admission:2021    Chief Complaint: Brought to the emergency room by EMS for increasing shortness of breath with minimal activity for about a week.    History of Present Illness:   Patient is 86-year-old female with complicated past medical history remarkable for anemia/asthma/choledocholithiasis/colitis COPD gastroesophageal reflux disease hypertension atrial fibrillation as well as hypothyroidism with nephrolithiasis monoclonal platelet function significantly as well as pulmonary hypertension presented to the emergency room after being discharged from the hospital in middle of May for similar presentation with a weeklong history of increasing shortness of breath with minimal activity.  Patient is very sedentary but not properly treated for 6 days of the bed with legs dangling.  In this background it was noted that patient was taken off of Lasix for about 2 months.  Patient was hospitalized for 5 days in May for what looks like due to hemorrhagic gastritis and duodenal ulcer.  Patient was suspected to be in volume overload in the emergency room with associated unknown vascular congestion.  Patient was given a dose of Lasix 40 mg IV x1 dose and subsequently admitted for further care.      Past Medical History:  Past Medical History:   Diagnosis Date   • Acute UTI (urinary tract infection) 2021   • Anemia    • Arrhythmia    • Arthritis    • Asthma    • Bradycardia    • Chest pain    • Choledocholithiasis 2021   • Colitis    • COPD (chronic obstructive pulmonary disease) (CMS/[facility])    • Diastolic dysfunction    • Essential hypertension 2016   • GERD (gastroesophageal reflux disease)    • Heart block     • Hypertension    • Hypertensive heart disease    • Hyperthyroidism    • Kidney stone    • Leukopenia    • Low back pain    • MGUS (monoclonal gammopathy of unknown significance)    • Nephrolithiasis    • Obesity    • Paroxysmal atrial fibrillation (CMS/HCC)    • Peptic ulceration    • PSVT (paroxysmal supraventricular tachycardia) (CMS/HCC)    • Pulmonary hypertension (CMS/HCC)    • Rectal bleed    • Sleep apnea    • Trifascicular block    • 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)    • Ventricular tachycardia (CMS/HCC)     nonsustained   • Vertigo      Past Surgical History:  Past Surgical History:   Procedure Laterality Date   • BACK SURGERY      lumbar fusion   • CARDIAC ELECTROPHYSIOLOGY PROCEDURE N/A 11/7/2018    Procedure: Pacemaker DC new   [place];  Surgeon: Jesus Granda MD;  Location:  [facility];  Service: Cardiology   • CHOLECYSTECTOMY     • COLONOSCOPY  06/16/2014    colitis, cryptitis,  tics, NBIH, TA w/low grade dysplasia   • COLONOSCOPY N/A 10/28/2019    Procedure: COLONOSCOPY WITH COLD AND HOT POLYPECTOMIES;  Surgeon: Micaela English MD;  [facility];  Service: Gastroenterology   • ENDOSCOPY N/A 5/13/2021    Procedure: ESOPHAGOGASTRODUODENOSCOPY with BX;  Surgeon: Stefan Mcfadden MD;  [facility];  Service: Gastroenterology;  Laterality: N/A;  EPIGASTRIC PAIN  --DUODENAL ULCER, HEMORRHAGIC GASTRITIS, HIATAL HERNIA    • HEMORRHOIDECTOMY     • HYSTERECTOMY     • KNEE ARTHROPLASTY     • MYOMECTOMY     • PACEMAKER IMPLANTATION     • SHOULDER SURGERY     • SINUS SURGERY     • TOE NAIL AMPUTATION  03/04/2019   • TONSILLECTOMY        Home Meds:  (Not in a hospital admission)    Current Meds:     Current Facility-Administered Medications:   •  [COMPLETED] Insert peripheral IV, , , Once **AND** sodium chloride 0.9 % flush 10 mL, 10 mL, Intravenous, PRN, Merlin Archibald MD    Current Outpatient Medications:   •  acetaminophen  (TYLENOL) 500 MG tablet, Take 1 tablet by mouth Every 6 (Six) Hours As Needed for Mild Pain ., Disp: , Rfl:   •  albuterol sulfate  (90 Base) MCG/ACT inhaler, Inhale 2 puffs Every 6 (Six) Hours As Needed for Wheezing., Disp: 1 inhaler, Rfl: 0  •  docusate sodium (COLACE) 100 MG capsule, Take 100 mg by mouth Every Night., Disp: , Rfl:   •  magnesium gluconate (MAGONATE) 30 MG tablet, Take 1 tablet by mouth Daily., Disp: 90 tablet, Rfl: 1  •  mesalamine (APRISO) 0.375 g 24 hr capsule, TAKE 4 CAPSULES DAILY, Disp: 360 capsule, Rfl: 0  •  pantoprazole (Protonix) 40 MG EC tablet, Take 1 tablet by mouth 2 (Two) Times a Day., Disp: 60 tablet, Rfl: 3  •  sucralfate (CARAFATE) 1 g tablet, Take 1 tablet by mouth 4 (Four) Times a Day Before Meals & at Bedtime., Disp: 120 tablet, Rfl: 3  •  vitamin B-12 (CYANOCOBALAMIN) 1000 MCG tablet, Take 1,000 mcg by mouth Daily., Disp: , Rfl:   •  warfarin (COUMADIN) 1 MG tablet, Take 1 mg by mouth 2 (Two) Times a Week. 1mg on Monday and Saturday, Disp: , Rfl:   •  warfarin (COUMADIN) 2.5 MG tablet, Take 2.5 mg by mouth Daily (Monday-Friday). Take 2.5mg Tues, Wed, Thurs, Friday, Sunday, Disp: , Rfl:   •  Wheat Dextrin (BENEFIBER DRINK MIX PO), Take  by mouth Every 2 (Two) Hours As Needed., Disp: , Rfl:   Allergies:  Allergies   Allergen Reactions   • Codeine Hallucinations   • Amitriptyline Rash   • Amoxicillin-Pot Clavulanate Rash   • Aspirin Unknown - Low Severity     Patient doesn't know why   • Bactrim [Sulfamethoxazole-Trimethoprim] Rash   • Carisoprodol-Aspirin-Codeine Palpitations   • Iodinated Diagnostic Agents Rash   • Latex Rash   • Naproxen Rash   • Nsaids Unknown - Low Severity     unknown   • Soma Compound With Codeine [Carisoprodol-Aspirin-Codeine] Rash   • Sulfa Antibiotics Rash   • Tramadol Palpitations     heart races      Social History:   Social History     Tobacco Use   • Smoking status: Former Smoker     Packs/day: 1.50     Years: 10.00     Pack years: 15.00      "Start date:      Quit date:      Years since quittin.5   • Smokeless tobacco: Never Used   • Tobacco comment: QUIT SMOKING    Substance Use Topics   • Alcohol use: No      Family History:  Family History   Problem Relation Age of Onset   • Diabetes Mother    • Breast cancer Sister    • Kidney cancer Sister    • Heart disease Sister    • Prostate cancer Brother    • Prostate cancer Brother    • Prostate cancer Brother    • Malig Hyperthermia Neg Hx           Review of Systems  See history of present illness and past medical history. Remainder of ROS is negative.  Constitutional: Remarkable for no fever or chills  Cardiovascular: Remarkable for shortness of breath with minimal activity the patient denies any swelling of her legs.  Respiratory: Cough but no sputum production.  GI: Preserved appetite no nausea vomiting or diarrhea  : Remarkable for no burning urination frequency urgency  Musculoskeletal: Remarkable for chronic swelling of the lower extremities patient does not ambulate much and mainly stays in the bed and states that this is a diabetic.  Neurological: Remarkable for no loss of consciousness or continence.    Vitals:   /78   Pulse 70   Temp 97.1 °F (36.2 °C) (Tympanic)   Resp 20   Ht 167.6 cm (66\")   Wt 91.2 kg (201 lb)   LMP  (LMP Unknown)   SpO2 100%   BMI 32.44 kg/m²   I/O: No intake or output data in the 24 hours ending 21 1627  Exam:  Patient is examined using the personal protective equipment as per guidelines from infection control for this particular patient as enacted.  Hand washing was performed before and after patient interaction.  General Appearance:    Alert, cooperative, no distress, appears stated age, morbidly obese female   Head:    Normocephalic, without obvious abnormality, atraumatic   Eyes:    PERRL, conjunctiva/corneas clear, EOM's intact, both eyes   Ears:    Normal external ear canals, both ears   Nose:   Nares normal, septum midline, mucosa " normal, no drainage    or sinus tenderness   Throat:   Lips, tongue, gums normal; oral mucosa pink and moist   Neck:   Supple, symmetrical, trachea midline, no adenopathy;     thyroid:  no enlargement/tenderness/nodules; no carotid    bruit or JVD   Back:     Symmetric, no curvature, ROM normal, no CVA tenderness   Lungs:    Decreased breath sounds at the bases   Chest Wall:    No tenderness or deformity    Heart:    Regular rate and rhythm, S1 and S2 normal, no murmur, rub   or gallop   Abdomen:    Obese soft nontender   Extremities:  Cannot elicit any   Pulses:   Pulses palpable in all extremities; symmetric all extremities   Skin:  Significant edema in the dependent portions of her arms legs and body wall.   Neurologic:  Weakness of lower extremities mellitus grossly nonfocal examination.       Data Review:      I reviewed the patient's new clinical results.  Results from last 7 days   Lab Units 06/22/21  1215   WBC 10*3/mm3 3.32*   HEMOGLOBIN g/dL 12.8   PLATELETS 10*3/mm3 159     Results from last 7 days   Lab Units 06/22/21  1215   SODIUM mmol/L 143   POTASSIUM mmol/L 3.7   CHLORIDE mmol/L 108*   CO2 mmol/L 28.6   BUN mg/dL 6*   CREATININE mg/dL 0.57   CALCIUM mg/dL 9.4   GLUCOSE mg/dL 124*     XR Chest 1 View    Result Date: 6/22/2021  There is stable elevation of the right hemidiaphragm. Mild atelectasis at the base of left lung is noted.  This report was finalized on 6/22/2021 4:05 PM by Dr. Joseph Barajas M.D.      ECG 12 Lead   Final Result   HEART RATE= 70  bpm   RR Interval= 854  ms   NC Interval= 363  ms   P Horizontal Axis= 38  deg   P Front Axis= 119  deg   QRSD Interval= 191  ms   QT Interval= 464  ms   QRS Axis= -63  deg   T Wave Axis= 115  deg   - ABNORMAL ECG -   Ventricular-paced complexes   No change from prior tracing   Electronically Signed By: Eddie Worley ([facility]) 22-Jun-2021 17:33:54   Date and Time of Study: 2021-06-22 12:20:48        Microbiology Results (last 10 days)     Procedure  Component Value - Date/Time    COVID PRE-OP / PRE-PROCEDURE SCREENING ORDER (NO ISOLATION) - Swab, Nasopharynx [[id number]]  (Normal) Collected: 06/22/21 1557    Lab Status: Final result Specimen: Swab from Nasopharynx Updated: 06/22/21 2250    Narrative:      The following orders were created for panel order COVID PRE-OP / PRE-PROCEDURE SCREENING ORDER (NO ISOLATION) - Swab, Nasopharynx.  Procedure                               Abnormality         Status                     ---------                               -----------         ------                     COVID-19,[facility]TIMA PANTHER,...[[id number]]  Normal              Final result                 Please view results for these tests on the individual orders.    COVID-19,APTIMA PANTHER,[facility] IN-HOUSE, NP/OP SWAB IN UTM/VTM/SALINE TRANSPORT MEDIA,24 HR TAT - Swab, Nasopharynx [[id number]]  (Normal) Collected: 06/22/21 1557    Lab Status: Final result Specimen: Swab from Nasopharynx Updated: 06/22/21 2250     COVID19 Not Detected    Narrative:      Fact sheet for providers: https://www.fda.gov/media/427280/download     Fact sheet for patients: https://www.fda.gov/media/445360/download    Test performed by RT PCR.          Assessment:  Active Hospital Problems    Diagnosis  POA   • **Exertional dyspnea [R06.00]  Yes   • COPD (chronic obstructive pulmonary disease) (CMS/HCC) [J44.9]  Unknown   • Generalized weakness [R53.1]  Yes   • Chronic anticoagulation [Z79.01]  Not Applicable   • Anemia [D64.9]  Yes   • Mediastinal lymphadenopathy [R59.0]  Yes   • Atrial fibrillation (CMS/HCC) [I48.91]  Yes   • History of atrial fibrillation [Z86.79]  Not Applicable   • Pacemaker [Z95.0]  Yes   • MGUS (monoclonal gammopathy of unknown significance) [D47.2]  Yes   • Leukopenia [D72.819]  Yes     Component      Latest Ref Rng & Units 4/24/2014 1/29/2015 3/30/2015 3/31/2015   WBC      4.50 - 10.70 10*3/mm3 2.10 (L) 3.45 (L) 2.38 (L) 2.99 (L)     Component      Latest Ref Rng & Units  4/1/2015 8/17/2016 9/12/2016 3/8/2017   WBC      4.50 - 10.70 10*3/mm3 3.63 (L) 2.82 (L) 2.97 (L) 2.24 (L)     Component      Latest Ref Rng & Units 8/30/2017 12/24/2017   WBC      4.50 - 10.70 10*3/mm3 2.94 (L) 3.70 (L)        • Pulmonary hypertension (CMS/HCC) [I27.20]  Yes   • HUGO (obstructive sleep apnea) [G47.33]  Yes   • Essential hypertension [I10]  Yes   • Gastroesophageal reflux disease without esophagitis [K21.9]  Yes       Medical decision making:  Acute exacerbation of congestive heart failure-plan is to provide him with IV diuretics consult cardiology service and provided strict CHANDRAKANT's.  COPD with obstructive sleep apnea and pulmonary hypertension-continue her current regimen including oxygen supplementation and nebulizer treatment and monitor.  History of atrial fibrillation-continue her current regimen including anticoagulation therapy.  Monitor  Hypertension-continue antihypertensive regimen including diuretics and avoid hypotensive episode.  History pacemaker in place-continue diuresis and with current regimen and consult cardiology service.  Database is limited as patient is unable to give detailed account of her history and recent changes in medications.  History of duodenal ulcer-patient is currently not actively bleeding but currently on anticoagulation therapy.  Plan is to monitor H&H.  Toshia Armstrong MD   6/22/2021  16:27 EDT  Much of this encounter note is an electronic transcription/translation of spoken language to printed text. The electronic translation of spoken language may permit erroneous, or at times, nonsensical words or phrases to be inadvertently transcribed; Although I have reviewed the note for such errors, some may still exist

## 2021-06-22 NOTE — ED TRIAGE NOTES
"Pt brought it for SOA and tachypnea. Pt from home, lives with family.  92% on room air. Pt has not taken lasix \"in awhile.\" Does not wear oxygen at home per EMS.    Pt currently 98% on 2lpm per EMS.     20G IV RAC and left forearm.  "

## 2021-06-22 NOTE — ED NOTES
Nursing report ED to floor  Brittany Villeda  86 y.o.  female    HPI (triage note):   Chief Complaint   Patient presents with   • Shortness of Breath       Admitting doctor:   Toshia Armstrong MD    Admitting diagnosis:   The primary encounter diagnosis was Exertional dyspnea. Diagnoses of Pulmonary hypertension (CMS/HCC), Acute on chronic congestive heart failure, unspecified heart failure type (CMS/HCC), and Obstructive sleep apnea were also pertinent to this visit.    Code status:   Current Code Status     Date Active Code Status Order ID Comments User Context       6/22/2021 1631 CPR [id number]  Toshia Armstrong MD ED     Advance Care Planning Activity      Questions for Current Code Status     Question Answer Comment    Code Status CPR     Medical Interventions (Level of Support Prior to Arrest) Full           Allergies:   Codeine, Amitriptyline, Amoxicillin-pot clavulanate, Aspirin, Bactrim [sulfamethoxazole-trimethoprim], Carisoprodol-aspirin-codeine, Iodinated diagnostic agents, Latex, Naproxen, Nsaids, Soma compound with codeine [carisoprodol-aspirin-codeine], Sulfa antibiotics, and Tramadol    Weight:       06/22/21  1143   Weight: 91.2 kg (201 lb)       Most recent vitals:   Vitals:    06/22/21 1300 06/22/21 1315 06/22/21 1332 06/22/21 1557   BP: 141/78 142/96  141/78   Pulse: 70 72 70 70   Resp:       Temp:       TempSrc:       SpO2: 95% 97% 100%    Weight:       Height:           Active LDAs/IV Access:   Lines, Drains & Airways    Active LDAs     Name:   Placement date:   Placement time:   Site:   Days:    Peripheral IV 06/22/21 Right Antecubital   06/22/21    --    Antecubital   less than 1    External Urinary Catheter   06/22/21    1600    --   less than 1                Labs (abnormal labs have a star):   Labs Reviewed   PROTIME-INR - Abnormal; Notable for the following components:       Result Value    Protime 24.1 (*)     INR 2.19 (*)     All other components within normal limits   COMPREHENSIVE  METABOLIC PANEL - Abnormal; Notable for the following components:    Glucose 124 (*)     BUN 6 (*)     Chloride 108 (*)     Albumin 3.20 (*)     Total Bilirubin 1.9 (*)     All other components within normal limits    Narrative:     GFR Normal >60  Chronic Kidney Disease <60  Kidney Failure <15     MAGNESIUM - Abnormal; Notable for the following components:    Magnesium 1.5 (*)     All other components within normal limits   CBC WITH AUTO DIFFERENTIAL - Abnormal; Notable for the following components:    WBC 3.32 (*)     All other components within normal limits   BLOOD GAS, ARTERIAL - Abnormal; Notable for the following components:    pO2, Arterial 65.0 (*)     Base Excess, Arterial 2.5 (*)     All other components within normal limits   BNP (IN-HOUSE) - Normal    Narrative:     Among patients with dyspnea, NT-proBNP is highly sensitive for the detection of acute congestive heart failure. In addition NT-proBNP of <300 pg/ml effectively rules out acute congestive heart failure with 99% negative predictive value.    Results may be falsely decreased if patient taking Biotin.     TROPONIN (IN-HOUSE) - Normal    Narrative:     Troponin T Reference Range:  <= 0.03 ng/mL-   Negative for AMI  >0.03 ng/mL-     Abnormal for myocardial necrosis.  Clinicians would have to utilize clinical acumen, EKG, Troponin and serial changes to determine if it is an Acute Myocardial Infarction or myocardial injury due to an underlying chronic condition.       Results may be falsely decreased if patient taking Biotin.     COVID PRE-OP / PRE-PROCEDURE SCREENING ORDER (NO ISOLATION)    Narrative:     The following orders were created for panel order COVID PRE-OP / PRE-PROCEDURE SCREENING ORDER (NO ISOLATION) - Swab, Nasopharynx.  Procedure                               Abnormality         Status                     ---------                               -----------         ------                     COVID-19,APTIMA PANTHER,...[[id number]]                                                    Please view results for these tests on the individual orders.   COVID-19,[facility]TIMA PANTHER,[facility],NP/OP SWAB IN UTM/VTM/SALINE TRANSPORT MEDIA,24 HR TAT   BLOOD GAS, ARTERIAL   CBC AND DIFFERENTIAL    Narrative:     The following orders were created for panel order CBC & Differential.  Procedure                               Abnormality         Status                     ---------                               -----------         ------                     CBC Auto Differential[[id number]]        Abnormal            Final result                 Please view results for these tests on the individual orders.       EKG:   ECG 12 Lead   Preliminary Result   HEART RATE= 70  bpm   RR Interval= 854  ms   OK Interval= 363  ms   P Horizontal Axis= 38  deg   P Front Axis= 119  deg   QRSD Interval= 191  ms   QT Interval= 464  ms   QRS Axis= -63  deg   T Wave Axis= 115  deg   - ABNORMAL ECG -   Ventricular-paced complexes   Prolonged OK interval   Nonspecific IVCD with LAD   LVH with secondary repolarization abnormality   ST elevation secondary to LVH   Electronically Signed By:    Date and Time of Study: 2021-06-22 12:20:48          Meds given in ED:   Medications   sodium chloride 0.9 % flush 10 mL (has no administration in time range)   furosemide (LASIX) injection 40 mg (40 mg Intravenous Given 6/22/21 0537)       Imaging results:  XR Chest 1 View    Result Date: 6/22/2021  There is stable elevation of the right hemidiaphragm. Mild atelectasis at the base of left lung is noted.  This report was finalized on 6/22/2021 4:05 PM by Dr. Joseph Barajas M.D.        Ambulatory status:   bedrest    Social issues:   Social History     Socioeconomic History   • Marital status:      Spouse name: Not on file   • Number of children: 10   • Years of education: High School   • Highest education level: Not on file   Tobacco Use   • Smoking status: Former Smoker     Packs/day: 1.50      Years: 10.00     Pack years: 15.00     Start date:      Quit date:      Years since quittin.5   • Smokeless tobacco: Never Used   • Tobacco comment: QUIT SMOKING    Vaping Use   • Vaping Use: Never used   Substance and Sexual Activity   • Alcohol use: No   • Drug use: Defer   • Sexual activity: Defer    Nursing report ED to floor       Daria Lawrence RN  [phone]

## 2021-06-22 NOTE — PROGRESS NOTES
Pharmacy Consult: Warfarin Dosing/ Monitoring    Brittany Villeda is a 86 y.o. female, estimated creatinine clearance is 57.5 mL/min (by C-G formula based on SCr of 0.57 mg/dL). weighing 91.2 kg (201 lb).     has a past medical history of Acute UTI (urinary tract infection) (2021), Anemia, Arrhythmia, Arthritis, Asthma, Bradycardia, Chest pain, Choledocholithiasis (2021), Colitis, COPD (chronic obstructive pulmonary disease) (CMS/[facility]), Diastolic dysfunction, Essential hypertension (2016), GERD (gastroesophageal reflux disease), Heart block, Hypertension, Hypertensive heart disease, Hyperthyroidism, Kidney stone, Leukopenia, Low back pain, MGUS (monoclonal gammopathy of unknown significance), Nephrolithiasis, Obesity, Paroxysmal atrial fibrillation (CMS/HCC), Peptic ulceration, PSVT (paroxysmal supraventricular tachycardia) (CMS/[facility]), Pulmonary hypertension (CMS/[facility]), Rectal bleed, Sleep apnea, Trifascicular block, 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CMS/[facility]), Ventricular tachycardia (CMS/[facility]), and Vertigo.    Social History     Tobacco Use   • Smoking status: Former Smoker     Packs/day: 1.50     Years: 10.00     Pack years: 15.00     Start date:      Quit date:      Years since quittin.5   • Smokeless tobacco: Never Used   • Tobacco comment: QUIT SMOKING    Vaping Use   • Vaping Use: Never used   Substance Use Topics   • Alcohol use: No   • Drug use: Defer       Results from last 7 days   Lab Units 21  1215   INR  2.19*   HEMOGLOBIN g/dL 12.8   HEMATOCRIT % 39.3   PLATELETS 10*3/mm3 159     Results from last 7 days   Lab Units 21  1215   SODIUM mmol/L 143   POTASSIUM mmol/L 3.7   CHLORIDE mmol/L 108*   CO2 mmol/L 28.6   BUN mg/dL 6*   CREATININE mg/dL 0.57   CALCIUM mg/dL 9.4   BILIRUBIN mg/dL 1.9*   ALK PHOS U/L 67   ALT (SGPT) U/L 7   AST (SGOT) U/L 15   GLUCOSE mg/dL 124*     Anticoagulation history: 2.5mg sun/Tues/Wed/Fri/Sat except 5mg Mon/Thurs.  Patient is followed by the [facility]    [facility]agulation:  Consulting provider: Dr. Armstrong  Start date: PTA  Indication: afib  Target INR: 2-3  Expected duration: indefinete   Bridge Therapy: none    Date 6/22            INR 2.19            Warfarin dose 2.5mg              Potential drug interactions: Patient is admitted with CHF and being diuresis, so likely some hepatic congestions so INR will need to be monitored closely as it may increase or decrease depending on fluid status.    Relevant nutrition status: Cardiac diet    Other:     Education complete?/ Date: TBD.     Assessment/Plan:  INR therapeutic tonight will plan to resume home regimen    1. Warfarin 2.5mg on Sun/Tues/Wed/Fri/Sat except 5mg on Mon/Thurs.   2. Daily INRs    Pharmacy will continue to follow until discharge or discontinuation of warfarin.   Fredi Pierson RPH  6/22/2021

## 2021-06-22 NOTE — ED TRIAGE NOTES
per ems.    Pt alert and in no acute distress. Pt reports symptoms improved with oxygen.    Daughter en route to verify meds and allergies.

## 2021-06-22 NOTE — ED PROVIDER NOTES
EMERGENCY DEPARTMENT ENCOUNTER    Room Number:  27/27  Date of encounter:  6/22/2021  PCP: Mega Esparza MD  Historian: Patient and daughter      HPI:  Chief Complaint: Shortness of breath  A complete HPI/ROS/PMH/PSH/SH/FH are unobtainable due to: Not applicable  Context: Brittany Villeda is a 86 y.o. female who presents to the ED c/o shortness of breath has been occurring for approximately 1 week.  The shortness of breath is worse when she sits up in her bed they roll her over to clean her in her bed or even when she eats.  When she is still and is not doing any activity she is not short of breath.  Is been going on for approximately 1 week and worse the past 24 hours.  Currently when I see her she is not short of breath and she is sitting up in bed in room 27.  She denies any chest pain at all.  Denies any abdominal pain.  She still is having problems with constipation but has had a small bowel movement this morning.  No black stool or blood in the stool.  She has been off her Lasix per the instructions of Dr. Walter for approximately 2 months.  About 1 week ago (uncertain of the exact date) she took 1 dose of Lasix.  She is uncertain why she is off Lasix.  She does not report any obvious increase in swelling to her extremities.  She has had no nausea or vomiting.  She also reports no cough or fever.  She has decreased appetite but that is pretty much at her baseline.  She has been compliant with all her medicines which include Coumadin.  She has been nonambulatory since November.  She had a diagnosis of PMR and was placed upon steroids.        Previous Episodes: Yes in the past.  Please see her hospitalization in May 2021.  Current Symptoms: Currently sitting still she is asymptomatic in the bed.    MEDICAL HISTORY REVIEWED  Patient was discharged from the hospital May 14, 2021.  She was in the hospital for 5 days.  She had hemorrhagic gastritis and duodenal ulcers.  Please see a copy of the discharge  diagnosis and hospital course below.  Discharge Diagnoses            Active Hospital Problems     Diagnosis   POA   • **Hemorrhagic gastritis [K29.71]   Yes   • Duodenal ulcer [K26.9]   Yes   • Epigastric pain [R10.13]   Yes   • Essential hypertension [I10]   Yes   • Hyperglycemia [R73.9]   Yes   • Choledocholithiasis [K80.50]   Yes   • Atrial fibrillation (CMS/HCC) [I48.91]   Yes       Resolved Hospital Problems     Diagnosis Date Resolved POA   • Nausea and vomiting [R11.2] 05/14/2021 Yes   • Abdominal pain [R10.9] 05/14/2021 Yes         Hospital Course      Brief admission history and physical.  Please refer to the H&P for full detail.  Very pleasant 86 years old -American female with a past history significant for anemia/asthma/choledocholithiasis/colitis/COPD/GERD/hypertension/A. fib/hypothyroidism/nephrolithiasis/monoclonal gammopathy of uncertain significant/chronic anticoagulation/pulmonary hypertension who presents to the emergency department with dyspnea and upper abdominal pain there was no cough no fever or chills.  No chest pain.  No palpitation.  No hemoptysis.  No nausea or vomiting.  Positive constipation.  No bleeding per rectum or melena.  Zickel examination on admission remarkable for temperature 97.9 a pulse of 80 respiratory rate of 14 and blood pressure 134/98.  The rest of the examination is remarkable for decreased bilateral air entry in both lungs.  Hospital course.  Initial ER evaluation included a CBC that was normal.  CMP that was normal except a CO2 of 21.  INR 3.47.  CT of the chest revealed no pulmonary embolus or aortic dissection.  Dilated ascending aorta at 4.1 cm.  No pneumothorax or pleural effusion.  CT abdomen and pelvis revealed mild biliary ductal dilatation with possible choledocholithiasis.  No acute inflammatory process.  Covid was negative.  Respiratory PCR panel was negative.  Lipase was normal.  Troponin was negative.  proBNP was normal.  The impression on this  lady was that of dyspnea.  CT of the chest was negative.  Chest examination was negative.  Her pulse ox was unremarkable.  She does have a groundglass density in the lingula that is old.  Her shortness of breath spontaneously resolved.  There was no evidence of angina or lower respiratory tract infection.  Her second issue was that of an abdominal pain associated with nausea.  Initially she was thought to have a retained choledocholithiasis.  GI consult was obtained they did not believe this this is a choledocholithiasis especially that she has normal liver function test and lipase.  Do not feel MRCP or ERCP is warranted.  They suspected peptic ulcer disease.  An EGD was done and it confirmed the presence of hemorrhagic gastritis but no active bleed with a nonbleeding duodenal ulcers.  Biopsies were taken.  She was placed on IV Protonix and sucralfate and switched back to p.o. Protonix and sucralfate at the time of discharge.  Her diet was initially liquids and it was advanced and she tolerated that fairly well.  There was no signs of active bleed in fact she did not have a bowel movement for 3 days during her hospital stay.  Her steroids were stopped as a contributing factor of the hemorrhagic gastritis and a duodenal ulcer especially that she is on anticoagulation.  She takes steroid because of polymyalgia rheumatica.  Her daughter.  This is clinically stable during the hospital stay.  I will refer her to an otologist as an outpatient for further follow-up.  She was noted to have hyperglycemia during her hospital stay and the A1c was normal.  She has a history of atrial fibrillation/hypertension/heart block status post pacer/diastolic congestive heart failure.  She was asymptomatic except initial dyspnea that has resolved.  Troponins were negative.  There was a rate controlled atrial fibrillation.  There was no evidence of angina or congestive heart failure.  Blood pressure remained under good control.  Initially  her INR was elevated and her Coumadin was stopped specially that she underwent an EGD.  Her Coumadin was resumed at the time of discharge to start 4 days after the discharge to avoid any bleeding from the biopsies..  At the time of discharge INR was 1.6.  She was counseled against nonsteroidal anti-inflammatory drugs.  At the time of discharge she was hemodynamically stable and asymptomatic and tolerating p.o. well her GI okayed the discharge.      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   • Leukopenia 09/12/2016   • MGUS (monoclonal gammopathy of unknown significance) 09/16/2016   • 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   • Other chest pain 12/24/2017   • Lichen sclerosus 08/23/2017   • Heart block 10/30/2018   • Sleep-related hypoxia 12/22/2018   • Bradycardia 12/29/2018   • Shoulder arthritis 01/28/2019   • History of atrial fibrillation 03/19/2019   • Pacemaker 03/19/2019   • Paroxysmal SVT (supraventricular tachycardia) (CMS/[facility]) 05/08/2019   • History of chest pain 05/08/2019   • Palpitations 05/08/2019   • Atrial fibrillation (CMS/HCC) 10/06/2019   • Rectal bleeding 10/15/2019   • Arthritis 12/13/2019   • Ascending aortic aneurysm (CMS/HCC) 08/24/2020   • Mediastinal lymphadenopathy 09/09/2020   • Immobility 11/20/2020   • Left knee pain 11/20/2020   • Chronic anticoagulation 11/20/2020   • Hemarthrosis of left knee 11/20/2020   • Anemia    • Obesity (BMI 30-39.9)    • Generalized weakness 04/08/2021   • Dysautonomia  (CMS/[facility]) 04/09/2021   • Weakness of both lower extremities 04/09/2021   • Macrocytosis 04/11/2021   • Arthritis of right wrist 04/13/2021   • Hyperparathyroidism (CMS/HCC) 04/28/2021   • Choledocholithiasis 05/09/2021   • Essential hypertension 05/10/2021   • Hyperglycemia 05/10/2021   • Epigastric pain 05/12/2021   • Duodenal ulcer 05/13/2021   • Hemorrhagic gastritis 05/13/2021     Resolved Ambulatory Problems     Diagnosis Date Noted   • Abnormal weight loss 05/12/2016   • Tachycardia 05/12/2016   • Nausea and vomiting 05/12/2016   • Hyperthyroidism 05/12/2016   • Fatigue 05/12/2016   • Dizziness 05/12/2016   • Diarrhea 05/12/2016   • Abnormal thyroid stimulating hormone (TSH) level 05/12/2016   • Abdominal pain 05/12/2016   • Abnormal EKG 06/30/2016   • Acute UTI (urinary tract infection) 04/08/2021   • Spondylosis of lumbosacral region without myelopathy or radiculopathy 04/09/2021   • Hypercalcemia 04/11/2021   • Hypomagnesemia 04/13/2021     Past Medical History:   Diagnosis Date   • Arrhythmia    • Asthma    • Chest pain    • Colitis    • COPD (chronic obstructive pulmonary disease) (CMS/[facility])    • GERD (gastroesophageal reflux disease)    • Hypertension    • Hypertensive heart disease    • Kidney stone    • Low back pain    • Nephrolithiasis    • Obesity    • Paroxysmal atrial fibrillation (CMS/[facility])    • Peptic ulceration    • PSVT (paroxysmal supraventricular tachycardia) (CMS/[facility])    • Rectal bleed    • Sleep apnea    • Ventricular tachycardia (CMS/[facility])    • Vertigo          PAST SURGICAL HISTORY  Past Surgical History:   Procedure Laterality Date   • BACK SURGERY      lumbar fusion   • CARDIAC ELECTROPHYSIOLOGY PROCEDURE N/A 11/7/2018    Procedure: Pacemaker DC new   [place];  Surgeon: Jesus Granda MD;  Location: [facility] INVASIVE LOCATION;  Service: Cardiology   • CHOLECYSTECTOMY     • COLONOSCOPY  06/16/2014    colitis, cryptitis,  tics, NBIH, TA w/low grade dysplasia   • COLONOSCOPY N/A  10/28/2019    Procedure: COLONOSCOPY WITH COLD AND HOT POLYPECTOMIES;  Surgeon: Micaela English MD;  Location: [facility] ENDOSCOPY;  Service: Gastroenterology   • ENDOSCOPY N/A 2021    Procedure: ESOPHAGOGASTRODUODENOSCOPY with BX;  Surgeon: Stefan Mcfadden MD;  [facility];  Service: Gastroenterology;  Laterality: N/A;  EPIGASTRIC PAIN  --DUODENAL ULCER, HEMORRHAGIC GASTRITIS, HIATAL HERNIA    • HEMORRHOIDECTOMY     • HYSTERECTOMY     • KNEE ARTHROPLASTY     • MYOMECTOMY     • PACEMAKER IMPLANTATION     • SHOULDER SURGERY     • SINUS SURGERY     • TOE NAIL AMPUTATION  2019   • TONSILLECTOMY           FAMILY HISTORY  Family History   Problem Relation Age of Onset   • Diabetes Mother    • Breast cancer Sister    • Kidney cancer Sister    • Heart disease Sister    • Prostate cancer Brother    • Prostate cancer Brother    • Prostate cancer Brother    • Malig Hyperthermia Neg Hx          SOCIAL HISTORY  Social History     Socioeconomic History   • Marital status:      Spouse name: Not on file   • Number of children: 10   • Years of education: High School   • Highest education level: Not on file   Tobacco Use   • Smoking status: Former Smoker     Packs/day: 1.50     Years: 10.00     Pack years: 15.00     Start date:      Quit date:      Years since quittin.5   • Smokeless tobacco: Never Used   • Tobacco comment: QUIT SMOKING    Vaping Use   • Vaping Use: Never used   Substance and Sexual Activity   • Alcohol use: No   • Drug use: Defer   • Sexual activity: Defer         ALLERGIES  Codeine, Amitriptyline, Amoxicillin-pot clavulanate, Aspirin, Bactrim [sulfamethoxazole-trimethoprim], Carisoprodol-aspirin-codeine, Iodinated diagnostic agents, Latex, Naproxen, Nsaids, Soma compound with codeine [carisoprodol-aspirin-codeine], Sulfa antibiotics, and Tramadol        REVIEW OF SYSTEMS  Review of Systems     All systems reviewed and negative except for those discussed in  HPI.       PHYSICAL EXAM    I have reviewed the triage vital signs and nursing notes.    ED Triage Vitals [06/22/21 1059]   Temp Heart Rate Resp BP SpO2   97.1 °F (36.2 °C) 70 20 145/69 98 %      Temp src Heart Rate Source Patient Position BP Location FiO2 (%)   Tympanic Monitor -- -- --       GENERAL: Elderly female no acute distress.Vital signs on my initial evaluation O2 sat is 98% on room air.  Heart rate is normal and blood pressure is normal.  HENT: nares patent  Head/neck/ face are symmetric without gross deformity, signs of trauma, or swelling  EYES: no scleral icterus, no conjunctival pallor.  NECK: Supple, no meningismus  CV: regular rhythm, regular rate with intact distal pulses.  No murmur or rub.  Pacemaker in the left upper quadrant.  No signs of inflammation or infection.  Clean dry and intact and nontender.  RESPIRATORY: normal effort and no respiratory distress.  Clear to auscultation bilaterally.  ABDOMEN: soft and nontender.  Morbidly obese.  MUSCULOSKELETAL: no deformity.  Significant obesity to lower extremities and upper extremities bilaterally.  There is some underlying edema as well that is approximately 2+.  (No change per the patient).  Intact distal pulses with no coolness, pallor or cyanosis to her distal extremities  NEURO: alert and appropriate, moves all extremities, follows commands.  Alert and oriented x3.  No focal motor or sensory changes.  SKIN: warm, dry    Vital signs and nursing notes reviewed.        LAB RESULTS  Recent Results (from the past 24 hour(s))   Protime-INR    Collection Time: 06/22/21 12:15 PM    Specimen: Blood   Result Value Ref Range    Protime 24.1 (H) 11.7 - 14.2 Seconds    INR 2.19 (H) 0.90 - 1.10   Comprehensive Metabolic Panel    Collection Time: 06/22/21 12:15 PM    Specimen: Blood   Result Value Ref Range    Glucose 124 (H) 65 - 99 mg/dL    BUN 6 (L) 8 - 23 mg/dL    Creatinine 0.57 0.57 - 1.00 mg/dL    Sodium 143 136 - 145 mmol/L    Potassium 3.7 3.5 -  5.2 mmol/L    Chloride 108 (H) 98 - 107 mmol/L    CO2 28.6 22.0 - 29.0 mmol/L    Calcium 9.4 8.6 - 10.5 mg/dL    Total Protein 6.6 6.0 - 8.5 g/dL    Albumin 3.20 (L) 3.50 - 5.20 g/dL    ALT (SGPT) 7 1 - 33 U/L    AST (SGOT) 15 1 - 32 U/L    Alkaline Phosphatase 67 39 - 117 U/L    Total Bilirubin 1.9 (H) 0.0 - 1.2 mg/dL    eGFR  African Amer 122 >60 mL/min/1.73    Globulin 3.4 gm/dL    A/G Ratio 0.9 g/dL    BUN/Creatinine Ratio 10.5 7.0 - 25.0    Anion Gap 6.4 5.0 - 15.0 mmol/L   BNP    Collection Time: 06/22/21 12:15 PM    Specimen: Blood   Result Value Ref Range    proBNP 1,178.0 0.0-1,800.0 pg/mL   Troponin    Collection Time: 06/22/21 12:15 PM    Specimen: Blood   Result Value Ref Range    Troponin T <0.010 0.000 - 0.030 ng/mL   Magnesium    Collection Time: 06/22/21 12:15 PM    Specimen: Blood   Result Value Ref Range    Magnesium 1.5 (L) 1.6 - 2.4 mg/dL   CBC Auto Differential    Collection Time: 06/22/21 12:15 PM    Specimen: Blood   Result Value Ref Range    WBC 3.32 (L) 3.40 - 10.80 10*3/mm3    RBC 4.05 3.77 - 5.28 10*6/mm3    Hemoglobin 12.8 12.0 - 15.9 g/dL    Hematocrit 39.3 34.0 - 46.6 %    MCV 97.0 79.0 - 97.0 fL    MCH 31.6 26.6 - 33.0 pg    MCHC 32.6 31.5 - 35.7 g/dL    RDW 14.1 12.3 - 15.4 %    RDW-SD 50.6 37.0 - 54.0 fl    MPV 9.0 6.0 - 12.0 fL    Platelets 159 140 - 450 10*3/mm3    Neutrophil % 54.6 42.7 - 76.0 %    Lymphocyte % 35.5 19.6 - 45.3 %    Monocyte % 8.7 5.0 - 12.0 %    Eosinophil % 0.3 0.3 - 6.2 %    Basophil % 0.6 0.0 - 1.5 %    Neutrophils, Absolute 1.81 1.70 - 7.00 10*3/mm3    Lymphocytes, Absolute 1.18 0.70 - 3.10 10*3/mm3    Monocytes, Absolute 0.29 0.10 - 0.90 10*3/mm3    Eosinophils, Absolute 0.01 0.00 - 0.40 10*3/mm3    Basophils, Absolute 0.02 0.00 - 0.20 10*3/mm3   ECG 12 Lead    Collection Time: 06/22/21 12:20 PM   Result Value Ref Range    QT Interval 464 ms   Blood Gas, Arterial -    Collection Time: 06/22/21  2:15 PM    Specimen: Arterial Blood   Result Value Ref Range     Site Arterial: right radial     Maury's Test Positive     pH, Arterial 7.435 7.350 - 7.450 pH units    pCO2, Arterial 40.1 35.0 - 45.0 mm Hg    pO2, Arterial 65.0 (L) 80.0 - 100.0 mm Hg    HCO3, Arterial 27.0 22.0 - 28.0 mmol/L    Base Excess, Arterial 2.5 (H) 0.0 - 2.0 mmol/L    O2 Saturation Calculated 93.1 92.0 - 99.0 %    Barometric Pressure for Blood Gas 750.1 mmHg    Modality Room Air     Rate 16 Breaths/minute       Ordered the above labs and independently reviewed the results.        RADIOLOGY  XR Chest 1 View    Result Date: 6/22/2021  EXAMINATION: SINGLE VIEW CHEST RADIOGRAPH  HISTORY: 86-year-old female with history of shortness of breath.  FINDINGS: An upright AP portable chest radiograph was obtained. Comparison is made to a chest radiograph dated 05/09/2021. There is stable elevation of the right hemidiaphragm. Crowding of the vascular markings is seen at the base of the right lung. Mild interstitial and hazy opacification seen at the base of the left lung. A dual lead left-sided cardiac pacemaker is noted and appears stable. Minimal atheromatous calcification within aortic arch is noted. The visualized osseous structures appear normal.      There is stable elevation of the right hemidiaphragm. Mild atelectasis at the base of left lung is noted.        I ordered the above noted radiological studies. Reviewed by me and discussed with radiologist.  See dictation for official radiology interpretation.      PROCEDURES    Procedures      MEDICATIONS GIVEN IN ER    Medications   sodium chloride 0.9 % flush 10 mL (has no administration in time range)   furosemide (LASIX) injection 40 mg (has no administration in time range)         PROGRESS, DATA ANALYSIS, CONSULTS, AND MEDICAL DECISION MAKING    This patient has a history of pulmonary hypertension obstructive sleep apnea as well as congestive heart failure and has been off her Lasix for approximately 2 months although those factors could be contributing to  her shortness of breath.  Currently she is stable.  Informed the patient and the daughter of the test that we will order.  She has been compliant with Coumadin which makes it unlikely that this is a pulmonary embolism.  She had a CT angiogram of her chest for shortness of breath in May and that did not show any acute process.  We will check an INR on her.  We will also check other lab work.  She reports no GI blood loss.  All questions answered  DDx includes CHF, acute coronary syndrome, pulmonary embolism, pneumothorax, pneumonia, asthma/COPD,aspiration,  pulmonary hypertension, metabolic acidosis, deconditioning, anemia, other hematologic etiologies such as CO poisoning, anxiety.     We are currently under a pandemic from the COVID19 infection.  The patient presented to the emergency department by ambulance or personal vehicle. I followed the current protocols required by Infection Control at [facility] in my evaluation and treatment of the patient. The patient was wearing a face mask during my evaluation and throughout my encounter. During my whole encounter with this patient I used appropriate personal protective equipment.  This equipment consisted of eye protection, facemask, gown, and gloves.  I applied this equipment before entering the room.      All labs have been independently reviewed by me.  All radiology studies have been reviewed by me and discussed with radiologist dictating the report.   EKG's independently viewed and interpreted by me.  Discussion below represents my analysis of pertinent findings related to patient's condition, differential diagnosis, treatment plan and final disposition.      ED Course as of Jun 22 1522   Tue Jun 22, 2021   1354 EKG readingEKG was done at 1220Is a rate of 70 she has some paced rhythmWide-complexLikely has some underlying LVH.  Diffuse nonspecific ST and T wave changesCompared to the previous EKG from 5/11/2021 and it looks fairly similar.    [MM]    1355 Patient currently is on warfarin.   INR(!): 2.19 [MM]   1355 Chest x-ray looks stable.  Chronic elevation of the diaphragm but no acute changes improved cruciated.    [MM]   1517 I discussed the case with Dr. Armstrong.  Agrees to admit the patient to the hospital.    [MM]   1520 I have reevaluated the patient.  Also examined her blood gas.  I suspect that her being off her Lasix is probably caused some exacerbation of her shortness of breath.  Her renal function is unremarkable.  I Ladi give her a dose of Lasix here.  I think her chronic congestive heart failure, pulmonary hypertension, and obstructive sleep apnea are all contributing factors to her dyspnea.  I explained this to the patient and the daughter in the room.  All questions answered.    [MM]      ED Course User Index  [MM] Merlin Archibald MD       AS OF 15:22 EDT VITALS:    BP - 142/96  HR - 70  TEMP - 97.1 °F (36.2 °C) (Tympanic)  02 SATS - 100%        DIAGNOSIS  Final diagnoses:   Exertional dyspnea   Pulmonary hypertension (CMS/HCC)   Acute on chronic congestive heart failure, unspecified heart failure type (CMS/HCC)   Obstructive sleep apnea         DISPOSITION  I have reviewed the test results with my patient and explained the current treatment plan.  I answered all of the patient's questions.  The patient will be admitted to monitor bed at this time.  The patient is not hypotensive and is tolerating their current disease condition well enough for a monitored bed at this time.  The patient's current condition does not require intensive care treatment at this time.             Merlin Archibald MD  06/22/21 [id number]

## 2021-06-23 ENCOUNTER — READMISSION MANAGEMENT (OUTPATIENT)
Dept: CALL CENTER | Facility: HOSPITAL | Age: 86
End: 2021-06-23

## 2021-06-23 VITALS
HEART RATE: 80 BPM | SYSTOLIC BLOOD PRESSURE: 116 MMHG | BODY MASS INDEX: 32.3 KG/M2 | WEIGHT: 201 LBS | HEIGHT: 66 IN | DIASTOLIC BLOOD PRESSURE: 67 MMHG | OXYGEN SATURATION: 94 % | RESPIRATION RATE: 18 BRPM | TEMPERATURE: 98 F

## 2021-06-23 DIAGNOSIS — I51.89 DIASTOLIC DYSFUNCTION: Primary | ICD-10-CM

## 2021-06-23 LAB
ALBUMIN SERPL-MCNC: 3.1 G/DL (ref 3.5–5.2)
ALBUMIN/GLOB SERPL: 0.9 G/DL
ALP SERPL-CCNC: 61 U/L (ref 39–117)
ALT SERPL W P-5'-P-CCNC: <5 U/L (ref 1–33)
ANION GAP SERPL CALCULATED.3IONS-SCNC: 9.2 MMOL/L (ref 5–15)
AST SERPL-CCNC: 13 U/L (ref 1–32)
BASOPHILS # BLD AUTO: 0.02 10*3/MM3 (ref 0–0.2)
BASOPHILS NFR BLD AUTO: 0.5 % (ref 0–1.5)
BILIRUB SERPL-MCNC: 2.4 MG/DL (ref 0–1.2)
BUN SERPL-MCNC: 5 MG/DL (ref 8–23)
BUN/CREAT SERPL: 10 (ref 7–25)
CALCIUM SPEC-SCNC: 9.1 MG/DL (ref 8.6–10.5)
CHLORIDE SERPL-SCNC: 102 MMOL/L (ref 98–107)
CO2 SERPL-SCNC: 30.8 MMOL/L (ref 22–29)
CREAT SERPL-MCNC: 0.5 MG/DL (ref 0.57–1)
DEPRECATED RDW RBC AUTO: 51.7 FL (ref 37–54)
EOSINOPHIL # BLD AUTO: 0.02 10*3/MM3 (ref 0–0.4)
EOSINOPHIL NFR BLD AUTO: 0.5 % (ref 0.3–6.2)
ERYTHROCYTE [DISTWIDTH] IN BLOOD BY AUTOMATED COUNT: 14.3 % (ref 12.3–15.4)
GFR SERPL CREATININE-BSD FRML MDRD: 142 ML/MIN/1.73
GLOBULIN UR ELPH-MCNC: 3.3 GM/DL
GLUCOSE SERPL-MCNC: 88 MG/DL (ref 65–99)
HBA1C MFR BLD: 4.9 % (ref 4.8–5.6)
HCT VFR BLD AUTO: 40.5 % (ref 34–46.6)
HGB BLD-MCNC: 13.3 G/DL (ref 12–15.9)
IMM GRANULOCYTES # BLD AUTO: 0.01 10*3/MM3 (ref 0–0.05)
IMM GRANULOCYTES NFR BLD AUTO: 0.2 % (ref 0–0.5)
INR PPP: 2.11 (ref 0.9–1.1)
LYMPHOCYTES # BLD AUTO: 1.57 10*3/MM3 (ref 0.7–3.1)
LYMPHOCYTES NFR BLD AUTO: 38.2 % (ref 19.6–45.3)
MCH RBC QN AUTO: 32 PG (ref 26.6–33)
MCHC RBC AUTO-ENTMCNC: 32.8 G/DL (ref 31.5–35.7)
MCV RBC AUTO: 97.4 FL (ref 79–97)
MONOCYTES # BLD AUTO: 0.58 10*3/MM3 (ref 0.1–0.9)
MONOCYTES NFR BLD AUTO: 14.1 % (ref 5–12)
NEUTROPHILS NFR BLD AUTO: 1.91 10*3/MM3 (ref 1.7–7)
NEUTROPHILS NFR BLD AUTO: 46.5 % (ref 42.7–76)
NRBC BLD AUTO-RTO: 0.2 /100 WBC (ref 0–0.2)
PLATELET # BLD AUTO: 163 10*3/MM3 (ref 140–450)
PMV BLD AUTO: 9.7 FL (ref 6–12)
POTASSIUM SERPL-SCNC: 2.8 MMOL/L (ref 3.5–5.2)
PROT SERPL-MCNC: 6.4 G/DL (ref 6–8.5)
PROTHROMBIN TIME: 23.4 SECONDS (ref 11.7–14.2)
RBC # BLD AUTO: 4.16 10*6/MM3 (ref 3.77–5.28)
SODIUM SERPL-SCNC: 142 MMOL/L (ref 136–145)
TROPONIN T SERPL-MCNC: <0.01 NG/ML (ref 0–0.03)
WBC # BLD AUTO: 4.11 10*3/MM3 (ref 3.4–10.8)

## 2021-06-23 PROCEDURE — G0378 HOSPITAL OBSERVATION PER HR: HCPCS

## 2021-06-23 PROCEDURE — 80053 COMPREHEN METABOLIC PANEL: CPT | Performed by: INTERNAL MEDICINE

## 2021-06-23 PROCEDURE — 84484 ASSAY OF TROPONIN QUANT: CPT | Performed by: INTERNAL MEDICINE

## 2021-06-23 PROCEDURE — 97162 PT EVAL MOD COMPLEX 30 MIN: CPT

## 2021-06-23 PROCEDURE — 25010000002 FUROSEMIDE PER 20 MG: Performed by: INTERNAL MEDICINE

## 2021-06-23 PROCEDURE — 97110 THERAPEUTIC EXERCISES: CPT

## 2021-06-23 PROCEDURE — 85610 PROTHROMBIN TIME: CPT | Performed by: INTERNAL MEDICINE

## 2021-06-23 PROCEDURE — 96376 TX/PRO/DX INJ SAME DRUG ADON: CPT

## 2021-06-23 PROCEDURE — 85025 COMPLETE CBC W/AUTO DIFF WBC: CPT | Performed by: INTERNAL MEDICINE

## 2021-06-23 PROCEDURE — 99214 OFFICE O/P EST MOD 30 MIN: CPT | Performed by: INTERNAL MEDICINE

## 2021-06-23 PROCEDURE — 83036 HEMOGLOBIN GLYCOSYLATED A1C: CPT | Performed by: INTERNAL MEDICINE

## 2021-06-23 PROCEDURE — 63710000001 DIPHENHYDRAMINE PER 50 MG: Performed by: HOSPITALIST

## 2021-06-23 RX ORDER — PREDNISONE 50 MG/1
50 TABLET ORAL EVERY 6 HOURS
Status: DISCONTINUED | OUTPATIENT
Start: 2021-06-23 | End: 2021-06-23

## 2021-06-23 RX ORDER — FUROSEMIDE 20 MG/1
20 TABLET ORAL DAILY
Qty: 30 TABLET | Refills: 0 | Status: SHIPPED | OUTPATIENT
Start: 2021-06-23 | End: 2021-07-29

## 2021-06-23 RX ORDER — FUROSEMIDE 20 MG/1
20 TABLET ORAL DAILY
Status: DISCONTINUED | OUTPATIENT
Start: 2021-06-23 | End: 2021-06-23 | Stop reason: HOSPADM

## 2021-06-23 RX ORDER — DIPHENHYDRAMINE HCL 50 MG
50 CAPSULE ORAL ONCE
Status: DISCONTINUED | OUTPATIENT
Start: 2021-06-23 | End: 2021-06-23

## 2021-06-23 RX ADMIN — SUCRALFATE 1 G: 1 TABLET ORAL at 09:50

## 2021-06-23 RX ADMIN — Medication 27 MG: at 09:52

## 2021-06-23 RX ADMIN — MESALAMINE 1.5 G: 0.38 CAPSULE, EXTENDED RELEASE ORAL at 09:48

## 2021-06-23 RX ADMIN — FUROSEMIDE 40 MG: 10 INJECTION, SOLUTION INTRAVENOUS at 09:53

## 2021-06-23 RX ADMIN — Medication 1000 MCG: at 09:50

## 2021-06-23 RX ADMIN — DIPHENHYDRAMINE HYDROCHLORIDE 50 MG: 50 CAPSULE ORAL at 09:51

## 2021-06-23 RX ADMIN — SODIUM CHLORIDE, PRESERVATIVE FREE 10 ML: 5 INJECTION INTRAVENOUS at 09:55

## 2021-06-23 RX ADMIN — PANTOPRAZOLE SODIUM 40 MG: 40 TABLET, DELAYED RELEASE ORAL at 09:50

## 2021-06-23 NOTE — PLAN OF CARE
Goal Outcome Evaluation:  Plan of Care Reviewed With: patient           Outcome Summary: pt admitted yesterday for dyspnea, pt received lasix and is having great output, , pt able swoolen, pt a&0 x4, pt turned appropriately, pt was not soa  this shift, will continue to monitor

## 2021-06-23 NOTE — PROGRESS NOTES
Discharge Planning Assessment  [facility]     Patient Name: Brittany Villeda  MRN: [id number]  Today's Date: 6/23/2021    Admit Date: 6/22/2021    Discharge Needs Assessment     Row Name 06/23/21 1058       Living Environment    Lives With  child(donaldo), adult    Current Living Arrangements  home/apartment/condo    Primary Care Provided by  child(donaldo)    Provides Primary Care For  no one, unable/limited ability to care for self    Family Caregiver if Needed  child(donaldo), adult    Family Caregiver Names  Amina [phone]    Quality of Family Relationships  helpful;involved;supportive    Able to Return to Prior Arrangements  yes       Resource/Environmental Concerns    Resource/Environmental Concerns  none       Transition Planning    Patient/Family Anticipates Transition to  home with family    Patient/Family Anticipated Services at Transition  home health care current w/ [facility]    Transportation Anticipated  -- [facility] van       Discharge Needs Assessment    Readmission Within the Last 30 Days  no previous admission in last 30 days    Equipment Currently Used at Home  wheelchair;walker, standard;hospital bed    Concerns to be Addressed  adjustment to diagnosis/illness;denies needs/concerns at this time    Discharge Facility/Level of Care Needs  home with home health    Provided Post Acute Provider List?  N/A    N/A Provider List Comment  current w/ [facility]    Current Discharge Risk  dependent with mobility/activities of daily living        Discharge Plan     Row Name 06/23/21 1101       Plan    Plan  Home w/ family and current w/ [facility] ([employer] van today at noon)    Patient/Family in Agreement with Plan  yes    Plan Comments  Met with pt and pt's daughter at the bedside,face sheet verified. Pt lives with her son, she has 6 daughters they take turns assisting  with 24/7 care.  She has a hospital bed, ramp, rollator, and wheelchair. She is Current with [facility] , Epic notification sent to [facility].  Plan is to return  home with 24/7 family support and [facility]. [place] [facility] van scheduled to transport home today @ NOON , s/w [facility] [phone]. No other needs identified.        Continued Care and Services - Admitted Since 6/22/2021     Home Medical Care     Service Provider Request Status Selected Services Address Phone Fax Patient Preferred    [facility] [place]  Pending - Request Sent N/A [address] [place] [phone] --            Selected Continued Care - Prior Encounters Includes selections from prior encounters from 3/24/2021 to 6/23/2021    Discharged on 5/14/2021 Admission date: 5/9/2021 - Discharge disposition: Home or Self Care    Home Medical Care     Service Provider Selected Services Address Phone Fax Patient Preferred    [facility] [place]  Home Health Services [address] [place] [phone] --                Discharged on 4/15/2021 Admission date: 4/7/2021 - [place] [facility]     Service Provider Selected Services Address Phone Fax Patient Preferred    [facility] [place] [place]  Home Health Services [address] [place] [phone] --                    Expected Discharge Date and Time     Expected Discharge Date Expected Discharge Time    Jun 23, 2021         Demographic Summary    No documentation.       Functional Status     Row Name 06/23/21 1101       Functional Status    Usual Activity Tolerance  fair    Current Activity Tolerance  fair       Functional Status, IADL    Medications  assistive person    Meal Preparation  assistive person    Housekeeping  completely dependent    Laundry  completely dependent    Shopping  assistive equipment and person;completely dependent       Mental Status    General Appearance WDL  WDL       Mental Status Summary    Recent Changes in Mental Status/Cognitive Functioning  no changes         Psychosocial    No documentation.       Abuse/Neglect    No documentation.       Legal    No documentation.       Substance Abuse    No documentation.       Patient Forms    No documentation.           Tova Rowland RN

## 2021-06-23 NOTE — PLAN OF CARE
Goal Outcome Evaluation:  Plan of Care Reviewed With: patient, daughter        Progress: improving  Outcome Summary: Pt seen for PT mary this am. She was admitted yesterday for increasing shortness of air. Pt with hx of COPD, CHF, HTN, A fib, and pacemaker. At baseline, pt lives at home and her children rotate staying with pt. Pt with very limited mobility at baseline. She typically stays in the bed but will sit up on EOB for meals. Pt does not stand or transfer. She does report that HHPT sees her several times a week and she will attempt standing with them. Pt currently presents with generalized weakness, decreased activity tolerance, and overall decreased functional mobility.  Pt can perform bed mobility with min A and required mod A x 2 to stand using RWx. She also took several small side steps toward HOB. Pt limited due to increased SOA with activity. She plans to return home with family at [place]. Will continue to follow for skilled PT to maximize safety, strength, endurance, and independence with mobility.

## 2021-06-23 NOTE — PLAN OF CARE
Goal Outcome Evaluation:  Plan of Care Reviewed With: patient, daughter   Pt aaox4, room air, mid 90's saturation.pt is very understanding about her medications and condition. Family very supportive and also know a lot about her medical condition.

## 2021-06-23 NOTE — CONSULTS
Patient Name: Brittany Villeda  :1935  86 y.o.    Date of Admission: 2021  Encounter Provider: Brooke Faustin MD  Date of Encounter Visit: 21  Place of Service: [facility] CARDIOLOGY  Referring Provider: Toshia Armstrong MD  Patient Care Team:  Mega Esparza MD as PCP - General (Family Medicine)  Micaela English MD as Consulting Physician (Gastroenterology)  Mary Grace Culp MD as Consulting Physician (Cardiology)  Jp Krause Jr., MD as Consulting Physician (Hematology and Oncology)  Yasmeen Pichardo [facility] as Pharmacist  Micaela English MD as Referring Physician (Gastroenterology)  Devon Valadez MD as Consulting Physician (Hematology and Oncology)  Rusty Interiano RPH as Pharmacist (Pharmacy)      Chief complaint: Shortness of breath    Reason for consultation: CHF    History of Present Illness:    This is a lady with paroxysmal atrial fibrillation on warfarin, hypertension, borderline dilated aortic root, sick sinus syndrome status post [employer] pacemaker in 2018.  This was recently interrogated and is functioning normally.  She came in in May with shortness of breath and had hemorrhagic gastritis with a duodenal ulcer.  She came back on  with shortness of breath.  She has been off Lasix for 2 months.  proBNP was 1178, magnesium 1.5, chest x-ray showed stable elevation of the right hemidiaphragm, mild atelectasis of the lung base.  She has had 2 IV doses of Lasix with great diuresis.  She is no longer short of breath.  She is not on oxygen.       Prior Cardiac Testing:   Echo 2021  · The right atrial cavity is severely dilated.  · Left atrium is dilated severely  · Estimated left ventricular EF = 60% Left ventricular systolic function is normal.  · Left ventricular diastolic function was indeterminate.  · Mildly reduced right ventricular systolic function noted.  · Saline test results are negative.     Pacer Interrogation  7/21/2020       Stress Test 5/29/2019  · Myocardial perfusion imaging indicates a normal myocardial perfusion study with no evidence of ischemia.  · Left ventricular ejection fraction is normal (Calculated EF = 69%).  · Impressions are consistent with a low risk study.       Past Medical History:   Diagnosis Date   • Acute UTI (urinary tract infection) 4/8/2021   • Anemia    • Arrhythmia    • Arthritis    • Asthma    • Bradycardia    • Chest pain    • Choledocholithiasis 5/9/2021   • Colitis    • COPD (chronic obstructive pulmonary disease) (CMS/HCC)    • Diastolic dysfunction    • Essential hypertension 5/12/2016   • GERD (gastroesophageal reflux disease)    • Heart block    • Hypertension    • Hypertensive heart disease    • Hyperthyroidism    • Kidney stone    • Leukopenia    • Low back pain    • MGUS (monoclonal gammopathy of unknown significance)    • Nephrolithiasis    • Obesity    • Paroxysmal atrial fibrillation (CMS/HCC)    • Peptic ulceration    • PSVT (paroxysmal supraventricular tachycardia) (CMS/HCC)    • Pulmonary hypertension (CMS/HCC)    • Rectal bleed    • Sleep apnea    • Trifascicular block    • 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)    • Ventricular tachycardia (CMS/HCC)     nonsustained   • Vertigo        Past Surgical History:   Procedure Laterality Date   • BACK SURGERY      lumbar fusion   • CARDIAC ELECTROPHYSIOLOGY PROCEDURE N/A 11/7/2018    Procedure: Pacemaker DC new   [place];  Surgeon: Jesus Granda MD;  Location: [facility] CATH INVASIVE LOCATION;  Service: Cardiology   • CHOLECYSTECTOMY     • COLONOSCOPY  06/16/2014    colitis, cryptitis,  tics, NBIH, TA w/low grade dysplasia   • COLONOSCOPY N/A 10/28/2019    Procedure: COLONOSCOPY WITH COLD AND HOT POLYPECTOMIES;  Surgeon: Micaela English MD;  Location: [facility] ENDOSCOPY;  Service: Gastroenterology   • ENDOSCOPY N/A 5/13/2021    Procedure: ESOPHAGOGASTRODUODENOSCOPY with BX;  Surgeon: Stefan Mcfadden MD;   Location: [facility] ENDOSCOPY;  Service: Gastroenterology;  Laterality: N/A;  EPIGASTRIC PAIN  --DUODENAL ULCER, HEMORRHAGIC GASTRITIS, HIATAL HERNIA    • HEMORRHOIDECTOMY     • HYSTERECTOMY     • JOINT REPLACEMENT     • KNEE ARTHROPLASTY     • MYOMECTOMY     • PACEMAKER IMPLANTATION     • SHOULDER SURGERY     • SINUS SURGERY     • TOE NAIL AMPUTATION  03/04/2019   • TONSILLECTOMY           Prior to Admission medications    Medication Sig Start Date End Date Taking? Authorizing Provider   acetaminophen (TYLENOL) 500 MG tablet Take 1 tablet by mouth Every 6 (Six) Hours As Needed for Mild Pain . 11/24/20  Yes Vargas Jones MD   docusate sodium (COLACE) 100 MG capsule Take 100 mg by mouth Every Night.   Yes ProviderEly MD   mesalamine (APRISO) 0.375 g 24 hr capsule TAKE 4 CAPSULES DAILY 2/26/21  Yes Micaela English MD   pantoprazole (Protonix) 40 MG EC tablet Take 1 tablet by mouth 2 (Two) Times a Day. 5/14/21  Yes Cedric Cee MD   sucralfate (CARAFATE) 1 g tablet Take 1 tablet by mouth 4 (Four) Times a Day Before Meals & at Bedtime. 6/14/21  Yes Mega Esparza MD   vitamin B-12 (CYANOCOBALAMIN) 1000 MCG tablet Take 1,000 mcg by mouth Daily. 4/9/15  Yes Ely Bolanos MD   warfarin (COUMADIN) 1 MG tablet Take 5 mg by mouth 2 (Two) Times a Week. 1mg on Monday and Saturday 5/3/21  Yes Ely Bolanos MD   warfarin (COUMADIN) 2.5 MG tablet Take 2.5 mg by mouth Daily (Monday-Friday). Take 2.5mg Tues, Wed, Thurs, Friday, Sunday   Yes Ely Bolanos MD   albuterol sulfate  (90 Base) MCG/ACT inhaler Inhale 2 puffs Every 6 (Six) Hours As Needed for Wheezing.  Patient taking differently: Inhale 2 puffs Every 6 (Six) Hours As Needed for Wheezing (NOT TAKEN  FOR 3 YEARS). 7/19/19   Merlin Archibald MD   magnesium gluconate (MAGONATE) 30 MG tablet Take 1 tablet by mouth Daily. 5/18/21 5/18/22  Mega Esparza MD   Wheat Dextrin (BENEFIBER DRINK MIX PO) Take  by mouth Every 2  (Two) Hours As Needed.    Provider, Historical, MD       Allergies   Allergen Reactions   • Codeine Hallucinations   • Amitriptyline Rash   • Amoxicillin-Pot Clavulanate Rash   • Aspirin Unknown - Low Severity     Patient doesn't know why   • Bactrim [Sulfamethoxazole-Trimethoprim] Rash   • Carisoprodol-Aspirin-Codeine Palpitations   • Iodinated Diagnostic Agents Rash   • Latex Rash   • Naproxen Rash   • Nsaids Unknown - Low Severity     unknown   • Soma Compound With Codeine [Carisoprodol-Aspirin-Codeine] Rash   • Sulfa Antibiotics Rash   • Tramadol Palpitations     heart races        Social History     Socioeconomic History   • Marital status:      Spouse name: Not on file   • Number of children: 10   • Years of education: High School   • Highest education level: Not on file   Tobacco Use   • Smoking status: Former Smoker     Packs/day: 1.50     Years: 10.00     Pack years: 15.00     Start date:      Quit date:      Years since quittin.5   • Smokeless tobacco: Never Used   • Tobacco comment: QUIT SMOKING    Vaping Use   • Vaping Use: Never used   Substance and Sexual Activity   • Alcohol use: No   • Drug use: Defer   • Sexual activity: Defer       Family History   Problem Relation Age of Onset   • Diabetes Mother    • Breast cancer Sister    • Kidney cancer Sister    • Heart disease Sister    • Prostate cancer Brother    • Prostate cancer Brother    • Prostate cancer Brother    • Malig Hyperthermia Neg Hx        REVIEW OF SYSTEMS:   All other systems reviewed and negative.        Objective:     Vitals:    21 2351 21 0412 21 0429 21 0758   BP: 133/72 115/64 129/70 116/67   BP Location: Left arm Left arm Left arm Left arm   Patient Position: Sitting Lying Lying Sitting   Pulse: 73 77 83 83   Resp: 20 20 20 18   Temp: 98.3 °F (36.8 °C) 98.4 °F (36.9 °C)  98 °F (36.7 °C)   TempSrc: Oral Oral  Oral   SpO2: 94% 94% 93% 94%   Weight:       Height:         Body mass index  is 32.44 kg/m².    Intake/Output Summary (Last 24 hours) at 6/23/2021 0904  Last data filed at 6/23/2021 0412  Gross per 24 hour   Intake --   Output 4750 ml   Net -4750 ml       Constitutional: She is oriented to person, place, and time. She appears well-developed. She does not appear ill.   HENT:   Head: Normocephalic and atraumatic. Head is without contusion.   Right Ear: Hearing normal. No drainage.   Left Ear: Hearing normal. No drainage.   Nose: No nasal deformity. No epistaxis.   Eyes: Lids are normal. Right eye exhibits no exudate. Left eye exhibits no exudate.  Neck: No JVD present. Carotid bruit is not present. No tracheal deviation present. No thyroid mass and no thyromegaly present.   Cardiovascular: Normal rate, regular rhythm and normal heart sounds.    Pulses:       Posterior tibial pulses are 2+ on the right side, and 2+ on the left side.   Pulmonary/Chest: Effort normal and breath sounds normal.   Abdominal: Soft. Normal appearance and bowel sounds are normal. There is no tenderness.   Musculoskeletal: Normal range of motion.        Right shoulder: She exhibits no deformity.        Left shoulder: She exhibits no deformity.   Neurological: She is alert and oriented to person, place, and time. She has normal strength.   Skin: Skin is warm, dry and intact. No rash noted.   Psychiatric: She has a normal mood and affect. Her behavior is normal. Thought content normal.   Vitals reviewed      Lab Review:     Results from last 7 days   Lab Units 06/23/21  0630   SODIUM mmol/L 142   POTASSIUM mmol/L 2.8*   CHLORIDE mmol/L 102   CO2 mmol/L 30.8*   BUN mg/dL 5*   CREATININE mg/dL 0.50*   CALCIUM mg/dL 9.1   BILIRUBIN mg/dL 2.4*   ALK PHOS U/L 61   ALT (SGPT) U/L <5   AST (SGOT) U/L 13   GLUCOSE mg/dL 88     Results from last 7 days   Lab Units 06/23/21  0630 06/22/21  1215   TROPONIN T ng/mL <0.010 <0.010     Results from last 7 days   Lab Units 06/23/21  0630   WBC 10*3/mm3 4.11   HEMOGLOBIN g/dL 13.3    HEMATOCRIT % 40.5   PLATELETS 10*3/mm3 163     Results from last 7 days   Lab Units 06/23/21  0630 06/22/21  1215   INR  2.11* 2.19*     Results from last 7 days   Lab Units 06/22/21  1215   MAGNESIUM mg/dL 1.5*            Admission EKG 06/22/2021:      Previous EKG 05/09/2021:    I personally viewed and interpreted the patient's EKG/Telemetry data.        Assessment and Plan:       1.  Acute diastolic heart failure.  Heart failure preserved ejection fraction.  She had an echo in April.  No need to repeat.  She also looks like she has some right heart failure.  She diuresed well with 2 doses of IV Lasix.  She is no longer symptomatic.  Her vital signs are stable and she is not requiring oxygen.  I am okay with her going home today.  I think she should go home on Lasix 20 mg p.o. daily.  BMP in a week.  She has a follow-up appointment already scheduled with Dr. Culp on July 19.  She should keep that appointment.  2.  Persistent atrial fibrillation.  She is on anticoagulation with Coumadin.  3.  Status post permanent pacemaker for sick sinus syndrome.  Reviewed recent device check.  4.  Dilated ascending aorta.  5.  Pulmonary hypertension  6.  Obstructive sleep apnea.  Declined treatment.  7.  Recent GI bleed.  8.  Systemic hypertension.    Brooke Faustin MD  06/23/21  09:04 EDT

## 2021-06-23 NOTE — DISCHARGE PLACEMENT REQUEST
"Brittany Villeda (86 y.o. Female)     Date of Birth Social Security Number Address Home Phone MRN    [id number]  [id number] [facility] [address] [phone] [id number]    Latter day Marital Status          [employer]        Admission Date Admission Type Admitting Provider Attending Provider Department, Room/Bed    6/22/21 Emergency Toshia Armstrong MD Richards, Stephen J, MD [address], [address]    Discharge Date Discharge Disposition Discharge Destination         [facility]              Attending Provider: Seun Gee MD    Allergies: Codeine, Amitriptyline, Amoxicillin-pot Clavulanate, Aspirin, Bactrim [Sulfamethoxazole-trimethoprim], Carisoprodol-aspirin-codeine, Iodinated Diagnostic Agents, Latex, Naproxen, Nsaids, Soma Compound With Codeine [Carisoprodol-aspirin-codeine], Sulfa Antibiotics, Tramadol    Isolation: None   Infection: None   Code Status: CPR    Ht: 167.6 cm (66\")   Wt: 91.2 kg (201 lb)    Admission Cmt: None   Principal Problem: Exertional dyspnea [R06.00]                 Active Insurance as of 6/22/2021     Primary Coverage     Payor Plan Insurance Group Employer/Plan Group    HUMANA MEDICARE REPLACEMENT HUMANA MEDICARE REPLACEMENT [id number]     Payor Plan Address Payor Plan Phone Number Payor Plan Fax Number Effective Dates    [address] [address] [phone]  1/1/2018 - None Entered    [facility] [id number]       Subscriber Name Subscriber Birth Date Member ID       BRITTANY VILLEDA 1935 [id number]           Secondary Coverage     Payor Plan Insurance Group Employer/Plan Group    [place]      Payor Plan Address Payor Plan Phone Number Payor Plan Fax Number Effective Dates    [address] [phone]  5/3/2021 - None Entered    [facility] [id number]       Subscriber Name Subscriber Birth Date Member ID       BRITTANY VILLEDA 1935 [id number]                 Emergency Contacts      (Rel.) " Home Phone Work Phone Mobile Phone    Amina Diana (Daughter) [phone] -- --    Narda Cam (Daughter) -- -- [phone]    Fatmata Duran (Daughter) [phone] -- --

## 2021-06-23 NOTE — THERAPY EVALUATION
Patient Name: Brittany Villeda  : 1935    MRN: [id number]                              Today's Date: 2021       Admit Date: 2021    Visit Dx:     ICD-10-CM ICD-9-CM   1. Exertional dyspnea  R06.00 786.09   2. Pulmonary hypertension (CMS/HCC)  I27.20 416.8   3. Acute on chronic congestive heart failure, unspecified heart failure type (CMS/[facility])  I50.9 428.0   4. Obstructive sleep apnea  G47.33 327.23     Patient Active Problem List   Diagnosis   • Sciatica   • Non-toxic multinodular goiter   • Chronic midline low back pain with right-sided sciatica   • Essential hypertension   • Gastroesophageal reflux disease without esophagitis   • Cataract   • Pulmonary hypertension (CMS/HCC)   • Diastolic dysfunction   • HUGO (obstructive sleep apnea)   • Trifascicular block   • Leukopenia   • MGUS (monoclonal gammopathy of unknown significance)   • 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CMS/[facility])   • Other chest pain   • Lichen sclerosus   • Heart block   • Sleep-related hypoxia   • Bradycardia   • Shoulder arthritis   • History of atrial fibrillation   • Pacemaker   • Paroxysmal SVT (supraventricular tachycardia) (CMS/[facility])   • History of chest pain   • Palpitations   • Atrial fibrillation (CMS/HCC)   • Rectal bleeding   • Arthritis   • Ascending aortic aneurysm (CMS/HCC)   • Mediastinal lymphadenopathy   • Immobility   • Left knee pain   • Chronic anticoagulation   • Hemarthrosis of left knee   • Anemia   • Obesity (BMI 30-39.9)   • Generalized weakness   • Dysautonomia (CMS/HCC)   • Weakness of both lower extremities   • Macrocytosis   • Arthritis of right wrist   • Hyperparathyroidism (CMS/HCC)   • Choledocholithiasis   • Essential hypertension   • Hyperglycemia   • Epigastric pain   • Duodenal ulcer   • Hemorrhagic gastritis   • Exertional dyspnea   • COPD (chronic obstructive pulmonary disease) (CMS/HCC)     Past Medical History:   Diagnosis Date   • Acute UTI (urinary tract infection) 2021    • Anemia    • Arrhythmia    • Arthritis    • Asthma    • Bradycardia    • Chest pain    • Choledocholithiasis 5/9/2021   • Colitis    • COPD (chronic obstructive pulmonary disease) (CMS/HCC)    • Diastolic dysfunction    • Essential hypertension 5/12/2016   • GERD (gastroesophageal reflux disease)    • Heart block    • Hypertension    • Hypertensive heart disease    • Hyperthyroidism    • Kidney stone    • Leukopenia    • Low back pain    • MGUS (monoclonal gammopathy of unknown significance)    • Nephrolithiasis    • Obesity    • Paroxysmal atrial fibrillation (CMS/HCC)    • Peptic ulceration    • PSVT (paroxysmal supraventricular tachycardia) (CMS/HCC)    • Pulmonary hypertension (CMS/HCC)    • Rectal bleed    • Sleep apnea    • Trifascicular block    • 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)    • Ventricular tachycardia (CMS/HCC)     nonsustained   • Vertigo      Past Surgical History:   Procedure Laterality Date   • BACK SURGERY      lumbar fusion   • CARDIAC ELECTROPHYSIOLOGY PROCEDURE N/A 11/7/2018    Procedure: Pacemaker DC new   [place];  Surgeon: Jesus Granda MD;  Location: [facility] CATH INVASIVE LOCATION;  Service: Cardiology   • CHOLECYSTECTOMY     • COLONOSCOPY  06/16/2014    colitis, cryptitis,  tics, NBIH, TA w/low grade dysplasia   • COLONOSCOPY N/A 10/28/2019    Procedure: COLONOSCOPY WITH COLD AND HOT POLYPECTOMIES;  Surgeon: Micaela English MD;  Location: [facility] ENDOSCOPY;  Service: Gastroenterology   • ENDOSCOPY N/A 5/13/2021    Procedure: ESOPHAGOGASTRODUODENOSCOPY with BX;  Surgeon: Stefan Mcfadden MD;  Location: [facility] ENDOSCOPY;  Service: Gastroenterology;  Laterality: N/A;  EPIGASTRIC PAIN  --DUODENAL ULCER, HEMORRHAGIC GASTRITIS, HIATAL HERNIA    • HEMORRHOIDECTOMY     • HYSTERECTOMY     • JOINT REPLACEMENT     • KNEE ARTHROPLASTY     • MYOMECTOMY     • PACEMAKER IMPLANTATION     • SHOULDER SURGERY     • SINUS SURGERY     • TOE NAIL AMPUTATION  03/04/2019    • TONSILLECTOMY       General Information     [facility] Name 06/23/21 1029          Physical Therapy Time and Intention    Document Type  evaluation  -EJ     Mode of Treatment  physical therapy  -[facility] Name 06/23/21 1029          General Information    Patient Profile Reviewed  yes  -EJ     Prior Level of Function  min assist:;bed mobility;mod assist:;ADL's pt does not ambulate, usually stays in bed and will sit up on EOB for meals, PT does see pt several times/week at home and working on standing and taking a few steps.  -EJ     Existing Precautions/Restrictions  fall  -     Barriers to Rehab  previous functional deficit  -[facility] Name 06/23/21 1029          Living Environment    Lives With  child(donaldo), adult  -[facility] Name 06/23/21 1029          Cognition    Orientation Status (Cognition)  oriented x 3  -[facility] Name 06/23/21 1029          Safety Issues, Functional Mobility    Impairments Affecting Function (Mobility)  strength;endurance/activity tolerance  -       User Key  (r) = Recorded By, (t) = Taken By, (c) = Cosigned By    Initials Name Provider Type     Judith Ledbetter, PT Physical Therapist        Mobility     Row Name 06/23/21 1031          Bed Mobility    Bed Mobility  supine-sit;sit-supine;scooting/bridging  -     Scooting/Bridging [place] (Bed Mobility)  maximum assist (25% patient effort);2 person assist  -     Supine-Sit [place] (Bed Mobility)  verbal cues;minimum assist (75% patient effort)  -     Sit-Supine [place] (Bed Mobility)  verbal cues;minimum assist (75% patient effort);2 person assist  -EJ     Assistive Device (Bed Mobility)  head of bed elevated;bed rails;draw sheet  -EJ     Row Name 06/23/21 1031          Transfers    Comment (Transfers)  cues for upright posture  -EJ     Row Name 06/23/21 1031          Sit-Stand Transfer    Sit-Stand [place] (Transfers)  verbal cues;moderate assist (50% patient effort);2 person assist  -     Assistive  Device (Sit-Stand Transfers)  walker, front-wheeled  -EJ     Row Name 06/23/21 1031          Gait/Stairs (Locomotion)    [place] Level (Gait)  verbal cues;nonverbal cues (demo/gesture);moderate assist (50% patient effort);2 person assist  -EJ     Assistive Device (Gait)  walker, front-wheeled  -EJ     Distance in Feet (Gait)  3 side steps toward HOB  -EJ     Deviations/Abnormal Patterns (Gait)  ovi decreased;stride length decreased  -EJ     Bilateral Gait Deviations  forward flexed posture;heel strike decreased  -EJ     Comment (Gait/Stairs)  limited by fatigue and weakness  -EJ       User Key  (r) = Recorded By, (t) = Taken By, (c) = Cosigned By    Initials Name Provider Type    Judith Demarco, PT Physical Therapist        Obj/Interventions     Row Name 06/23/21 1032          Range of Motion Comprehensive    General Range of Motion  no range of motion deficits identified  -EJ     Row Name 06/23/21 1032          Strength Comprehensive (MMT)    Comment, General Manual Muscle Testing (MMT) Assessment  generalized weakness  -[facility] Name 06/23/21 1032          Balance    Balance Assessment  sitting static balance;standing static balance;standing dynamic balance  -EJ     Static Sitting Balance  WFL  -EJ     Static Standing Balance  mild impairment;supported  -EJ     Dynamic Standing Balance  moderate impairment;supported  -EJ       User Key  (r) = Recorded By, (t) = Taken By, (c) = Cosigned By    Initials Name Provider Type    Judith Demarco, PT Physical Therapist        Goals/Plan     Row Name 06/23/21 1037          Bed Mobility Goal 1 (PT)    Activity/Assistive Device (Bed Mobility Goal 1, PT)  bed mobility activities, all  -EJ     [place] Level/Cues Needed (Bed Mobility Goal 1, PT)  contact guard assist  -EJ     Time Frame (Bed Mobility Goal 1, PT)  1 week  -     Row Name 06/23/21 1037          Transfer Goal 1 (PT)    Activity/Assistive Device (Transfer Goal 1, PT)  transfers,  all;walker, rolling;sit-to-stand/stand-to-sit;bed-to-chair/chair-to-bed  -EJ     [place] Level/Cues Needed (Transfer Goal 1, PT)  2 person assist;minimum assist (75% or more patient effort)  -EJ     Time Frame (Transfer Goal 1, PT)  1 week  -EJ     Row Name 06/23/21 1037          Gait Training Goal 1 (PT)    Activity/Assistive Device (Gait Training Goal 1, PT)  gait (walking locomotion);walker, rolling  -EJ     [place] Level (Gait Training Goal 1, PT)  minimum assist (75% or more patient effort);2 person assist  -EJ     Distance (Gait Training Goal 1, PT)  10  -EJ     Time Frame (Gait Training Goal 1, PT)  1 week  -EJ       User Key  (r) = Recorded By, (t) = Taken By, (c) = Cosigned By    Initials Name Provider Type    EJ Judith Ledbetter, PT Physical Therapist        Clinical Impression     Row Name 06/23/21 1032          Pain    Additional Documentation  Pain Scale: Numbers Pre/Post-Treatment (Group)  -EJ     Row Name 06/23/21 1032          Pain Scale: Numbers Pre/Post-Treatment    Pretreatment Pain Rating  0/10 - no pain  -     Row Name 06/23/21 1032          Plan of Care Review    Plan of Care Reviewed With  patient;daughter  -EJ     Progress  improving  -EJ     Outcome Summary  Pt seen for PT eval this am. She was admitted yesterday for increasing shortness of air. Pt with hx of COPD, CHF, HTN, A fib, and pacemaker. At baseline, pt lives at home and her children rotate staying with pt. Pt with very limited mobility at baseline. She typically stays in the bed but will sit up on EOB for meals. Pt does not stand or transfer. She does report that HHPT sees her several times a week and she will attempt standing with them. Pt currently presents with generalized weakness, decreased activity tolerance, and overall decreased functional mobility.  Pt can perform bed mobility with min A and required mod A x 2 to stand using RWx. She also took several small side steps toward HOB. Pt limited due to increased  SOA with activity. She plans to return home with family at [place]. Will continue to follow for skilled PT to maximize safety, strength, endurance, and independence with mobility.  -     Row Name 06/23/21 1032          Therapy Assessment/Plan (PT)    Patient/Family Therapy Goals Statement (PT)  go home  -EJ     Rehab Potential (PT)  fair, will monitor progress closely  -EJ     Criteria for Skilled Interventions Met (PT)  yes  -     Row Name 06/23/21 1032          Vital Signs    Post SpO2 (%)  92  -EJ     O2 Delivery Post Treatment  room air  -EJ     Pre Patient Position  Supine  -EJ     Intra Patient Position  Standing  -EJ     Post Patient Position  Supine  -EJ       User Key  (r) = Recorded By, (t) = Taken By, (c) = Cosigned By    Initials Name Provider Type    Judith Demarco PT Physical Therapist        Outcome Measures     Row Name 06/23/21 1037          How much help from another person do you currently need...    Turning from your back to your side while in flat bed without using bedrails?  3  -EJ     Moving from lying on back to sitting on the side of a flat bed without bedrails?  3  -EJ     Moving to and from a bed to a chair (including a wheelchair)?  2  -EJ     Standing up from a chair using your arms (e.g., wheelchair, bedside chair)?  2  -EJ     Climbing 3-5 steps with a railing?  1  -EJ     To walk in hospital room?  1  -EJ     AM-PAC 6 Clicks Score (PT)  12  -EJ     Row Name 06/23/21 1037          Functional Assessment    Outcome Measure Options  AM-PAC 6 Clicks Basic Mobility (PT)  -       User Key  (r) = Recorded By, (t) = Taken By, (c) = Cosigned By    Initials Name Provider Type    Judith Demarco, PT Physical Therapist        Physical Therapy Education                 Title: PT OT SLP Therapies (In Progress)     Topic: Physical Therapy (In Progress)     Point: Mobility training (Done)     Learning Progress Summary           Patient Acceptance, E,TB,D, VU,NR by LA NENA at 6/23/2021 1038                    Point: Home exercise program (Not Started)     Learner Progress:  Not documented in this visit.          Point: Body mechanics (Not Started)     Learner Progress:  Not documented in this visit.          Point: Precautions (Not Started)     Learner Progress:  Not documented in this visit.                      User Key     Initials Effective Dates Name Provider Type Discipline     06/16/21 -  Judith Ledbetter, PT Physical Therapist PT              PT Recommendation and Plan  Planned Therapy Interventions (PT): balance training, bed mobility training, gait training, home exercise program, patient/family education, strengthening, ROM (range of motion), transfer training  Plan of Care Reviewed With: patient, daughter  Progress: improving  Outcome Summary: Pt seen for PT eval this am. She was admitted yesterday for increasing shortness of air. Pt with hx of COPD, CHF, HTN, A fib, and pacemaker. At baseline, pt lives at home and her children rotate staying with pt. Pt with very limited mobility at baseline. She typically stays in the bed but will sit up on EOB for meals. Pt does not stand or transfer. She does report that HHPT sees her several times a week and she will attempt standing with them. Pt currently presents with generalized weakness, decreased activity tolerance, and overall decreased functional mobility.  Pt can perform bed mobility with min A and required mod A x 2 to stand using RWx. She also took several small side steps toward HOB. Pt limited due to increased SOA with activity. She plans to return home with family at [place]. Will continue to follow for skilled PT to maximize safety, strength, endurance, and independence with mobility.     Time Calculation:   PT Charges     Row Name 06/23/21 1038             Time Calculation    Start Time  1000  -EJ      Stop Time  1021  -EJ      Time Calculation (min)  21 min  -EJ      PT Received On  06/23/21  -EJ      PT - Next Appointment  06/25/21  -EJ       PT Goal Re-Cert Due Date  06/30/21  -EJ         Time Calculation- PT    Total Timed Code Minutes- PT  12 minute(s)  -EJ        User Key  (r) = Recorded By, (t) = Taken By, (c) = Cosigned By    Initials Name Provider Type    Judith Demarco, PT Physical Therapist        Therapy Charges for Today     Code Description Service Date Service Provider Modifiers Qty    46248758516 HC PT EVAL MOD COMPLEXITY 2 6/23/2021 Judith Ledbetter, PT GP 1    58712199717 HC PT THER PROC EA 15 MIN 6/23/2021 Judith Ledbetter, PT GP 1    08084971158 HC PT THER SUPP EA 15 MIN 6/23/2021 Judith Ledbetter, PT GP 1          PT G-Codes  Outcome Measure Options: AM-PAC 6 Clicks Basic Mobility (PT)  AM-PAC 6 Clicks Score (PT): 12    Judith Ledbetter, PT  6/23/2021

## 2021-06-23 NOTE — PLAN OF CARE
Problem: Adult Inpatient Plan of Care  Goal: Plan of Care Review  6/23/2021 1155 by Eddie Castellanos RN  Outcome: Met  Flowsheets (Taken 6/23/2021 1155)  Plan of Care Reviewed With:   patient   daughter  6/23/2021 1151 by Eddie Castellanos RN  Outcome: Ongoing, Progressing  Flowsheets (Taken 6/23/2021 1151)  Plan of Care Reviewed With:   patient   daughter  Goal: Patient-Specific Goal (Individualized)  6/23/2021 1155 by Eddie Castellanos RN  Outcome: Met  6/23/2021 1151 by Eddie Castlelanos RN  Outcome: Ongoing, Progressing  Goal: Absence of Hospital-Acquired Illness or Injury  6/23/2021 1155 by Eddie Castlelanos RN  Outcome: Met  6/23/2021 1151 by Eddie Castellanos RN  Outcome: Ongoing, Progressing  Intervention: Identify and Manage Fall Risk  Recent Flowsheet Documentation  Taken 6/23/2021 1000 by Eddie Castellanos RN  Safety Promotion/Fall Prevention:   activity supervised   safety round/check completed  Taken 6/23/2021 0805 by Eddie Castellanos RN  Safety Promotion/Fall Prevention:   activity supervised   safety round/check completed  Intervention: Prevent Skin Injury  Recent Flowsheet Documentation  Taken 6/23/2021 1000 by Eddie Castellanos RN  Body Position:   turned   tilted, right  Taken 6/23/2021 0805 by Eddie Castellanos RN  Body Position: position changed independently  Intervention: Prevent Infection  Recent Flowsheet Documentation  Taken 6/23/2021 1000 by Eddie Castellanos RN  Infection Prevention:   hand hygiene promoted   personal protective equipment utilized  Taken 6/23/2021 0805 by Eddie Castellanos RN  Infection Prevention:   personal protective equipment utilized   hand hygiene promoted  Goal: Optimal Comfort and Wellbeing  6/23/2021 1155 by Eddie Castellanos RN  Outcome: Met  6/23/2021 1151 by Eddie Castellanos RN  Outcome: Ongoing, Progressing  Goal: Readiness for Transition of Care  6/23/2021 1155 by Eddie Castellanos RN  Outcome: Met  6/23/2021 1151 by Emanuel  CORRINE Morgan  Outcome: Ongoing, Progressing     Problem: Fall Injury Risk  Goal: Absence of Fall and Fall-Related Injury  6/23/2021 1155 by Eddie Castellanos RN  Outcome: Met  6/23/2021 1151 by Eddie Castellanos RN  Outcome: Ongoing, Progressing  Intervention: Promote Injury-Free Environment  Recent Flowsheet Documentation  Taken 6/23/2021 1000 by Eddie Castellanos RN  Safety Promotion/Fall Prevention:   activity supervised   safety round/check completed  Taken 6/23/2021 0805 by Eddie Castellanos RN  Safety Promotion/Fall Prevention:   activity supervised   safety round/check completed     Problem: Skin Injury Risk Increased  Goal: Skin Health and Integrity  6/23/2021 1155 by Eddie Castellanos RN  Outcome: Met  6/23/2021 1151 by Eddie Castellanos RN  Outcome: Ongoing, Progressing  Intervention: Optimize Skin Protection  Recent Flowsheet Documentation  Taken 6/23/2021 1000 by Eddie Castellanos RN  Head of Bed (HOB): HOB elevated  Taken 6/23/2021 0805 by Eddie Castellanos RN  Head of Bed (HOB): HOB elevated     Problem: COPD Comorbidity  Goal: Maintenance of COPD Symptom Control  6/23/2021 1155 by Eddie Castellanos RN  Outcome: Met  6/23/2021 1151 by Eddie Castellanos RN  Outcome: Ongoing, Progressing   Goal Outcome Evaluation:  Plan of Care Reviewed With: patient, daughter

## 2021-06-23 NOTE — PROGRESS NOTES
[phone]   LOS: 1 day     Name: Brittany Villeda  Age/Sex: 86 y.o. female  :  1935        PCP: Mega Esparza MD  Chief Complaint   Patient presents with   • Shortness of Breath      Subjective   She feels a lot better today denies new issues or complaints. She wants to go home today.  General: No Fever or Chills, Cardiac: No Chest Pain or Palpitations, Resp: No Cough or SOA, GI: No Nausea, Vomiting, or Diarrhea and Other: No bleeding    docusate sodium, 100 mg, Oral, Nightly  furosemide, 40 mg, Intravenous, BID  magnesium (as) gluconate, 27 mg, Oral, Daily  mesalamine, 1.5 g, Oral, Daily  pantoprazole, 40 mg, Oral, BID  sodium chloride, 10 mL, Intravenous, Q12H  sucralfate, 1 g, Oral, 4x Daily AC & at Bedtime  vitamin B-12, 1,000 mcg, Oral, Daily  warfarin, 2.5 mg, Oral, Once per day on Sun Tue Wed Fri Sat  [START ON 2021] warfarin, 5 mg, Oral, Once per day on   wheat dextrin, 1 each, Oral, Daily      Pharmacy to dose warfarin,         Objective   Vital Signs  Temp:  [97.1 °F (36.2 °C)-98.6 °F (37 °C)] 98.4 °F (36.9 °C)  Heart Rate:  [70-83] 83  Resp:  [20] 20  BP: (115-157)/(58-96) 129/70  Body mass index is 32.44 kg/m².    Intake/Output Summary (Last 24 hours) at 2021 0626  Last data filed at 2021 0412  Gross per 24 hour   Intake --   Output 4750 ml   Net -4750 ml       Physical Exam  Constitutional:       Appearance: Normal appearance.   Cardiovascular:      Rate and Rhythm: Normal rate and regular rhythm.   Pulmonary:      Effort: Pulmonary effort is normal. No respiratory distress.   Skin:     General: Skin is warm and dry.   Neurological:      General: No focal deficit present.      Mental Status: She is alert and oriented to person, place, and time.   Psychiatric:         Mood and Affect: Mood normal.         Behavior: Behavior normal.           Results Review:       I reviewed the patient's new clinical results.  Results from last 7 days   Lab Units 21  8458    WBC 10*3/mm3 3.32*   HEMOGLOBIN g/dL 12.8   PLATELETS 10*3/mm3 159     Results from last 7 days   Lab Units 06/22/21  1215   SODIUM mmol/L 143   POTASSIUM mmol/L 3.7   CHLORIDE mmol/L 108*   CO2 mmol/L 28.6   BUN mg/dL 6*   CREATININE mg/dL 0.57   CALCIUM mg/dL 9.4   MAGNESIUM mg/dL 1.5*   Estimated Creatinine Clearance: 57.5 mL/min (by C-G formula based on SCr of 0.57 mg/dL).      Assessment/Plan   Active Hospital Problems    Diagnosis  POA   • **Exertional dyspnea [R06.00]  Yes   • COPD (chronic obstructive pulmonary disease) (CMS/HCC) [J44.9]  Unknown   • Generalized weakness [R53.1]  Yes   • Chronic anticoagulation [Z79.01]  Not Applicable   • Anemia [D64.9]  Yes   • Mediastinal lymphadenopathy [R59.0]  Yes   • Atrial fibrillation (CMS/HCC) [I48.91]  Yes   • History of atrial fibrillation [Z86.79]  Not Applicable   • Pacemaker [Z95.0]  Yes   • MGUS (monoclonal gammopathy of unknown significance) [D47.2]  Yes   • Leukopenia [D72.819]  Yes   • Pulmonary hypertension (CMS/HCC) [I27.20]  Yes   • HUGO (obstructive sleep apnea) [G47.33]  Yes   • Essential hypertension [I10]  Yes   • Gastroesophageal reflux disease without esophagitis [K21.9]  Yes      Resolved Hospital Problems   No resolved problems to display.       PLAN  This is an 86-year-old female with a history of diastolic heart failure right-sided heart failure A. fib MGUS obstructive sleep apnea and hypertension that presents to the hospital with shortness of breath and weakness and is found to have acute on chronic diastolic heart failure and acute on chronic right heart failure  -She responded well to IV diuretics and is back to her baseline today. Cardiology has evaluated the patient and recommended oral Lasix daily at home.  -She has follow-up arranged with her cardiologist in the outpatient setting.  -Family and patient both desire discharge home today  -Discussed home, will continue home health.      Disposition  Plan Home today with home  health      Seun Gee MD  [facility]  06/23/21  06:26 EDT       Your medication list      START taking these medications      Instructions Last Dose Given Next Dose Due   furosemide 20 MG tablet  Commonly known as: LASIX      Take 1 tablet by mouth Daily.          CHANGE how you take these medications      Instructions Last Dose Given Next Dose Due   albuterol sulfate  (90 Base) MCG/ACT inhaler  Commonly known as: PROVENTIL HFA;VENTOLIN HFA;PROAIR HFA  What changed: reasons to take this      Inhale 2 puffs Every 6 (Six) Hours As Needed for Wheezing.          CONTINUE taking these medications      Instructions Last Dose Given Next Dose Due   acetaminophen 500 MG tablet  Commonly known as: TYLENOL      Take 1 tablet by mouth Every 6 (Six) Hours As Needed for Mild Pain .       BENEFIBER DRINK MIX PO      Take  by mouth Every 2 (Two) Hours As Needed.       docusate sodium 100 MG capsule  Commonly known as: COLACE      Take 100 mg by mouth Every Night.       magnesium gluconate 30 MG tablet  Commonly known as: MAGONATE      Take 1 tablet by mouth Daily.       mesalamine 0.375 g 24 hr capsule  Commonly known as: APRISO      TAKE 4 CAPSULES DAILY       pantoprazole 40 MG EC tablet  Commonly known as: Protonix      Take 1 tablet by mouth 2 (Two) Times a Day.       sucralfate 1 g tablet  Commonly known as: CARAFATE      Take 1 tablet by mouth 4 (Four) Times a Day Before Meals & at Bedtime.       vitamin B-12 1000 MCG tablet  Commonly known as: CYANOCOBALAMIN      Take 1,000 mcg by mouth Daily.       warfarin 2.5 MG tablet  Commonly known as: COUMADIN      Take 2.5 mg by mouth Daily (Monday-Friday). Take 2.5mg Tues, Wed, Thurs, Friday, Sunday       warfarin 1 MG tablet  Commonly known as: COUMADIN      Take 5 mg by mouth 2 (Two) Times a Week. 1mg on Monday and Saturday             Where to Get Your Medications      These medications were sent to [facility]  [address] [facility] [id number]    Hours: 7:00 AM-6:00 PM Mon-Fri, 8:00 AM-4:30 PM Sat-Sun (Closed 12-12:30PM) Phone: [phone]   · furosemide 20 MG tablet

## 2021-06-23 NOTE — PROGRESS NOTES
Pharmacy Consult: Warfarin Dosing/ Monitoring    Brittany Villeda is a 86 y.o. female, estimated creatinine clearance is 57.5 mL/min (by C-G formula based on SCr of 0.57 mg/dL). weighing 91.2 kg (201 lb).     has a past medical history of Acute UTI (urinary tract infection) (2021), Anemia, Arrhythmia, Arthritis, Asthma, Bradycardia, Chest pain, Choledocholithiasis (2021), Colitis, COPD (chronic obstructive pulmonary disease) (CMS/Formerly [facility]), Diastolic dysfunction, Essential hypertension (2016), GERD (gastroesophageal reflux disease), Heart block, Hypertension, Hypertensive heart disease, Hyperthyroidism, Kidney stone, Leukopenia, Low back pain, MGUS (monoclonal gammopathy of unknown significance), Nephrolithiasis, Obesity, Paroxysmal atrial fibrillation (CMS/Formerly [facility]), Peptic ulceration, PSVT (paroxysmal supraventricular tachycardia) (CMS/Formerly [facility]), Pulmonary hypertension (CMS/[facility]ly [facility]), Rectal bleed, Sleep apnea, Trifascicular block, 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CMS/Formerly [facility]), Ventricular tachycardia (CMS/Formerly [facility]), and Vertigo.    Social History     Tobacco Use    Smoking status: Former Smoker     Packs/day: 1.50     Years: 10.00     Pack years: 15.00     Start date:      Quit date:      Years since quittin.5    Smokeless tobacco: Never Used    Tobacco comment: QUIT SMOKING    Vaping Use    Vaping Use: Never used   Substance Use Topics    Alcohol use: No    Drug use: Defer       Results from last 7 days   Lab Units 21  0630 21  1215   INR  2.11* 2.19*   HEMOGLOBIN g/dL 13.3 12.8   HEMATOCRIT % 40.5 39.3   PLATELETS 10*3/mm3 163 159     Results from last 7 days   Lab Units 21  1215   SODIUM mmol/L 143   POTASSIUM mmol/L 3.7   CHLORIDE mmol/L 108*   CO2 mmol/L 28.6   BUN mg/dL 6*   CREATININE mg/dL 0.57   CALCIUM mg/dL 9.4   BILIRUBIN mg/dL 1.9*   ALK PHOS U/L 67   ALT (SGPT) U/L 7   AST (SGOT) U/L 15   GLUCOSE mg/dL 124*     Anticoagulation history: Patient is followed by  [facility]. Most recent clinic note from 6/15/21 stated patient was taking 5 mg every Mon, Thur; 2.5 mg all other days.     Hospital Anticoagulation:  Consulting provider: Dr. Armstrong  Start date: [facility]  Indication: afib  Target INR: 2-3  Expected duration: indefinete   Bridge Therapy: none    Date 6/22 6/23           INR 2.19 2.11           Warfarin dose 2.5 mg 2.5 mg             Potential drug interactions:   Sucralfate - moderate: Sucralfate may diminish the anticoagulant effect of Vitamin K Antagonists. Sucralfate may decrease the serum concentration of Vitamin K Antagonists.    Relevant nutrition status: Diet Regular, Cardiac    Other: Patient is admitted with CHF exacerbation and being diuresed. INR may increase due to hepatic congestion or decrease depending on fluid status.    Education complete?/ Date: No, patient is chronically managed by [facility] and has received formal education.    Assessment/Plan:  INR therapeutic at 2.11. Patient's home regimen was continued and warfarin 2.5 mg dose will be given this evening. Pharmacy will continue to follow until discharge or discontinuation of warfarin.     Maye Alfred, PharmD  Pharmacy Resident   6/23/2021

## 2021-06-23 NOTE — OUTREACH NOTE
Prep Survey      Responses   [facility] patient discharged from?  [place]   Is LACE score < 7 ?  No   Emergency Room discharge w/ pulse ox?  No   Eligibility  [facility]   Date of Admission  06/22/21   Date of Discharge  06/23/21   Discharge Disposition  Home or Self Care   Discharge diagnosis  external dypnea   Does the patient have one of the following disease processes/diagnoses(primary or secondary)?  Other   Does the patient have Home health ordered?  Yes   What is the Home health agency?   [facility]    Is there a DME ordered?  No   Prep survey completed?  Yes          Ana Mendiola RN

## 2021-06-24 ENCOUNTER — TRANSITIONAL CARE MANAGEMENT TELEPHONE ENCOUNTER (OUTPATIENT)
Dept: CALL CENTER | Facility: HOSPITAL | Age: 86
End: 2021-06-24

## 2021-06-24 NOTE — OUTREACH NOTE
Call Center TCM Note      Responses   [facility] patient discharged from?  [place]   Does the patient have one of the following disease processes/diagnoses(primary or secondary)?  Other   TCM attempt successful?  Yes   Discharge diagnosis  external dypnea   Person spoke with today (if not patient) and relationship  Spoke with pt and dtr present   Meds reviewed with patient/caregiver?  Yes   Is the patient having any side effects they believe may be caused by any medication additions or changes?  No   Does the patient have all medications ordered at discharge?  Yes   Is the patient taking all medications as directed (includes completed medication regime)?  Yes   Does the patient have a primary care provider?   Yes   Does the patient have an appointment with their PCP within 7 days of discharge?  No   Comments regarding PCP  Pt is sched fro Video Visit with PCP Dr Esparza on 07/28/2021, she is open to sooner appt if Dr Esparza feels this is needed. Pt also has Video Visit fwp with Cardio MD Dr Culp on 07/19/2021.   Has the patient kept scheduled appointments due by today?  Yes   What is the Home health agency?   [facility]    Has home health visited the patient within 72 hours of discharge?  Yes   Home health comments  Per pt  is already sched to see her later today    Psychosocial issues?  No   Did the patient receive a copy of their discharge instructions?  Yes   Nursing interventions  Reviewed instructions with patient   What is the patient's perception of their health status since discharge?  Improving   Is the patient/caregiver able to teach back signs and symptoms related to disease process for when to call PCP?  Yes   Is the patient/caregiver able to teach back signs and symptoms related to disease process for when to call 911?  Yes   Is the patient/caregiver able to teach back the hierarchy of who to call/visit for symptoms/problems? PCP, Specialist, Home health nurse, Urgent Care, ED, 911  Yes   If the  patient is a current smoker, are they able to teach back resources for cessation?  Not a smoker   TCM call completed?  Yes   Wrap up additional comments  Pt is feeling much better, her breathing is greatly improved. New med in place. HH coming today. Pt main concern is she was instructed to restrict fluid intake but unsure how much is ok. She is normally drinsl alot of water. Also there is a medication pt called Nesium(?) that she got in hospital she said helped her and wants to discuss rx for this. Can Dr Esparza be of assist with these questions? Pt is sched fro Video Visit with PCP Dr Esparza on 07/28/2021, she is open to sooner appt if Dr Esparza feels this is needed. Pt also has Video Visit fwp with Cardio MD Dr Culp on 07/19/2021           Janice Oakes MA    6/24/2021, 10:19 EDT

## 2021-06-28 ENCOUNTER — ANTICOAGULATION VISIT (OUTPATIENT)
Dept: PHARMACY | Facility: HOSPITAL | Age: 86
End: 2021-06-28

## 2021-06-28 LAB — INR PPP: 2.3

## 2021-06-28 NOTE — PROGRESS NOTES
Anticoagulation Clinic Progress Note    Anticoagulation Summary  As of 2021    INR goal:  2.0-3.0   TTR:  69.0 % (2.7 y)   INR used for dosin.30 (2021)   Warfarin maintenance plan:  5 mg every Mon, Thu; 2.5 mg all other days   Weekly warfarin total:  22.5 mg   No change documented:  Rusty Interiano RPH   Plan last modified:  Rusty Interiano RPH (2021)   Next INR check:  2021   Priority:  Maintenance   Target end date:  Indefinite    Indications    Atrial fibrillation (CMS/HCC) [I48.91]             Anticoagulation Episode Summary     INR check location:      Preferred lab:      Send INR reminders to:   [facility] [place]    Comments:        Anticoagulation Care Providers     Provider Role Specialty Phone number    Mary Grace Culp MD Referring Cardiology [phone]        Findings:  Admitted last week f/t SOA/fluid overload.     INR History:  Anticoagulation Monitoring 2021 6/15/2021 2021   INR 3.90 2.10 2.30   INR Date 2021 6/15/2021 2021   INR Goal 2.0-3.0 2.0-3.0 2.0-3.0   Trend Down Same Same   Last Week Total 25 mg 20 mg 22.5 mg   Next Week Total 20 mg 22.5 mg 22.5 mg   Sun 2.5 mg 2.5 mg 2.5 mg   Mon 5 mg 5 mg 5 mg   Tue Hold () 2.5 mg 2.5 mg   Wed 2.5 mg 2.5 mg 2.5 mg   Thu 5 mg 5 mg 5 mg   Fri 2.5 mg 2.5 mg 2.5 mg   Sat 2.5 mg 2.5 mg 2.5 mg   Visit Report - - -   Some recent data might be hidden       Plan:  1. INR is Therapeutic today- see above in Anticoagulation Summary.   Provided instructions to Carolina with [facility] to Continue their warfarin regimen- see above in Anticoagulation Summary.  2. Follow up in 1 week      Rusty Interiano RPH

## 2021-06-29 ENCOUNTER — TELEPHONE (OUTPATIENT)
Dept: FAMILY MEDICINE CLINIC | Facility: CLINIC | Age: 86
End: 2021-06-29

## 2021-06-29 NOTE — TELEPHONE ENCOUNTER
KATELYN WITH [facility] CALLED TO REQUEST RE-INSTATING PT SESSIONS, TWICE A WEEK FOR 3 WEEKS.  VERBAL ORDER CAN BE PROVIDED BY CALLING KATELYN [phone]

## 2021-06-29 NOTE — TELEPHONE ENCOUNTER
Orders have been approved. Patient states she is on a water restriction but is not sure. Advised HH to call the cardiologist since they are treating her for the CHF.

## 2021-06-30 ENCOUNTER — READMISSION MANAGEMENT (OUTPATIENT)
Dept: CALL CENTER | Facility: HOSPITAL | Age: 86
End: 2021-06-30

## 2021-06-30 NOTE — OUTREACH NOTE
Medical Week 2 Survey      Responses   [facility] patient discharged from?  [place]   Does the patient have one of the following disease processes/diagnoses(primary or secondary)?  Other   Week 2 attempt successful?  No   Unsuccessful attempts  Attempt 1          Mariana Daniels RN

## 2021-07-01 ENCOUNTER — TELEPHONE (OUTPATIENT)
Dept: CARDIOLOGY | Facility: CLINIC | Age: 86
End: 2021-07-01

## 2021-07-01 NOTE — TELEPHONE ENCOUNTER
Spoke with Dr. Culp.  Would not go over 1.5 L a day.  Would try to keep sodium under 2000 mg a day as well.

## 2021-07-01 NOTE — TELEPHONE ENCOUNTER
Pt's daughter (Amina) said the pt was in the hospital 6/22/2021 and discharged with fluid restriction.  No one told her much she could or couldn't drink.  Recommendations?

## 2021-07-02 ENCOUNTER — READMISSION MANAGEMENT (OUTPATIENT)
Dept: CALL CENTER | Facility: HOSPITAL | Age: 86
End: 2021-07-02

## 2021-07-02 NOTE — OUTREACH NOTE
Medical Week 2 Survey      Responses   [facility] patient discharged from?  [place]   Does the patient have one of the following disease processes/diagnoses(primary or secondary)?  Other   Week 2 attempt successful?  No   Unsuccessful attempts  Attempt 2          Janice Webster RN

## 2021-07-06 ENCOUNTER — ANTICOAGULATION VISIT (OUTPATIENT)
Dept: PHARMACY | Facility: HOSPITAL | Age: 86
End: 2021-07-06

## 2021-07-06 LAB — INR PPP: 1.8

## 2021-07-06 NOTE — PROGRESS NOTES
Anticoagulation Clinic Progress Note    Anticoagulation Summary  As of 2021    INR goal:  2.0-3.0   TTR:  69.0 % (2.7 y)   INR used for dosin.80 (2021)   Warfarin maintenance plan:  5 mg every Mon, Thu; 2.5 mg all other days   Weekly warfarin total:  22.5 mg   Plan last modified:  Rusty Interiano RPH (2021)   Next INR check:  2021   Priority:  Maintenance   Target end date:  Indefinite    Indications    Atrial fibrillation (CMS/HCC) [I48.91]             Anticoagulation Episode Summary     INR check location:      Preferred lab:      Send INR reminders to:  [facility] [place]    Comments:        Anticoagulation Care Providers     Provider Role Specialty Phone number    Mary Grace Culp MD Referring Cardiology [phone]          INR History:  Anticoagulation Monitoring 6/15/2021 2021 2021   INR 2.10 2.30 1.80   INR Date 6/15/2021 2021 2021   INR Goal 2.0-3.0 2.0-3.0 2.0-3.0   Trend Same Same Same   Last Week Total 20 mg 22.5 mg 22.5 mg   Next Week Total 22.5 mg 22.5 mg 25 mg   Sun 2.5 mg 2.5 mg 2.5 mg   Mon 5 mg 5 mg 5 mg   Tue 2.5 mg 2.5 mg 5 mg ()   Wed 2.5 mg 2.5 mg 2.5 mg   Thu 5 mg 5 mg 5 mg   Fri 2.5 mg 2.5 mg 2.5 mg   Sat 2.5 mg 2.5 mg 2.5 mg   Visit Report - - -   Some recent data might be hidden       Plan:  1. INR is Subtherapeutic today- see above in Anticoagulation Summary.   Provided instructions to Maria Antonia with [facility] to Change their warfarin regimen- see above in Anticoagulation Summary.  2. Follow up in 1 week      Rusty Interiano RPH

## 2021-07-09 ENCOUNTER — READMISSION MANAGEMENT (OUTPATIENT)
Dept: CALL CENTER | Facility: HOSPITAL | Age: 86
End: 2021-07-09

## 2021-07-09 ENCOUNTER — TELEPHONE (OUTPATIENT)
Dept: FAMILY MEDICINE CLINIC | Facility: CLINIC | Age: 86
End: 2021-07-09

## 2021-07-09 NOTE — TELEPHONE ENCOUNTER
LEFT [facility] VOICEMAIL REGARDING IF PALOMA IS HAVING ANY OTHER UTI SYMPTOMS. OK PER HIPAA.

## 2021-07-09 NOTE — TELEPHONE ENCOUNTER
SPOKE TO KATELYN ABOUT UA ORDER AND DR. RANDHAWA'S MESSAGE. LEFT DAUGHTER, CONCEPCION SOSA, VOICEMAIL TO GIVE THE OFFICE A CALL BACK REGARDING ORDER AND MORE QUESTIONS ABOUT UTI SYMPTOMS.

## 2021-07-09 NOTE — TELEPHONE ENCOUNTER
Please asked the nurse if the patient is having any pain with urination, frequent urination, fever, abdominal pain, or other specific urinary tract symptoms.  I typically do not order urinalysis for just an odor and darker colored urine, if no other symptoms.  You can also call the patient or the daughter and see how she is feeling.

## 2021-07-09 NOTE — TELEPHONE ENCOUNTER
Caller: KATELYN- [facility]     Best call back number: [phone]    What orders are you requesting (i.e. lab or imaging): URINE ANALYSIS     In what timeframe would the patient need to come in: ASAP    Additional notes: URINE HAS STRONG ORDER AND DARKER COLOR

## 2021-07-09 NOTE — OUTREACH NOTE
Medical Week 3 Survey      Responses   [facility] patient discharged from?  [place]   Does the patient have one of the following disease processes/diagnoses(primary or secondary)?  Other   Week 3 attempt successful?  Yes   Call start time  0837   Call end time  0839   Meds reviewed with patient/caregiver?  Yes   Is the patient taking all medications as directed (includes completed medication regime)?  Yes   Has the patient kept scheduled appointments due by today?  Yes   What is the patient's perception of their health status since discharge?  Improving   Week 3 Call Completed?  Yes   Wrap up additional comments  Pt. states, is feeling better, no new issues, her daughter takes care of her, she says,           Ann Flores, RN

## 2021-07-09 NOTE — TELEPHONE ENCOUNTER
SPOKE TO KATELYN REGARDING UA ORDER. SHE INFORMED ME TO CONTACT THE MAIN OFFICE. I SPOKE TO IVAN AND SHE IS GOING TO PUT IN AN ORDER FOR THE UA.

## 2021-07-12 LAB — INR PPP: 2

## 2021-07-13 ENCOUNTER — ANTICOAGULATION VISIT (OUTPATIENT)
Dept: PHARMACY | Facility: HOSPITAL | Age: 86
End: 2021-07-13

## 2021-07-13 ENCOUNTER — TELEPHONE (OUTPATIENT)
Dept: CARDIOLOGY | Facility: CLINIC | Age: 86
End: 2021-07-13

## 2021-07-13 NOTE — PROGRESS NOTES
Anticoagulation Clinic Progress Note    Anticoagulation Summary  As of 2021    INR goal:  2.0-3.0   TTR:  68.5 % (2.8 y)   INR used for dosin.00 (2021)   Warfarin maintenance plan:  5 mg every Mon, Thu; 2.5 mg all other days   Weekly warfarin total:  22.5 mg   Plan last modified:  Rusty Interiano [facility] (2021)   Next INR check:     Priority:  Maintenance   Target end date:  Indefinite    Indications    Atrial fibrillation (CMS/HCC) [I48.91]             Anticoagulation Episode Summary     INR check location:      Preferred lab:      Send INR reminders to:  [facility]    Comments:        Anticoagulation Care Providers     Provider Role Specialty Phone number    Mary Grace Culp MD Referring Cardiology [phone]          Clinic Interview:  Patient Findings     Negatives:  Signs/symptoms of thrombosis, Signs/symptoms of bleeding,   Laboratory test error suspected, Change in health, Change in alcohol use,   Change in activity, Upcoming invasive procedure, Emergency department   visit, Upcoming dental procedure, Missed doses, Extra doses, Change in   medications, Change in diet/appetite, Hospital admission, Bruising, Other   complaints      Clinical Outcomes     Negatives:  Major bleeding event, Thromboembolic event,   Anticoagulation-related hospital admission, Anticoagulation-related ED   visit, Anticoagulation-related fatality        INR History:  Anticoagulation Monitoring 2021   INR 2.30 1.80 2.00   INR Date 2021   INR Goal 2.0-3.0 2.0-3.0 2.0-3.0   Trend Same Same Same   Last Week Total 22.5 mg 22.5 mg 25 mg   Next Week Total 22.5 mg 25 mg 22.5 mg   Sun 2.5 mg 2.5 mg 2.5 mg   Mon 5 mg 5 mg 5 mg   Tue 2.5 mg 5 mg () 2.5 mg   Wed 2.5 mg 2.5 mg 2.5 mg   Thu 5 mg 5 mg 5 mg   Fri 2.5 mg 2.5 mg 2.5 mg   Sat 2.5 mg 2.5 mg 2.5 mg   Visit Report - - -   Some recent data might be hidden       Plan:  1. INR is Therapeutic today- see above in  Anticoagulation Summary. Spoke with Maria Antonia/[facility].  Therapeutic after boost last week.  Recently high on relatively average dosing and also recent admission for hemorrhagic gastritis (05/21).  Cont 5mg MoTh, 2.5mg all other days for now.  Will instruct Brittany Villeda to Continue their warfarin regimen- see above in Anticoagulation Summary.  2. Follow up in 1 week.  3. They have been instructed to call if any changes in medications, doses, concerns, etc. Patient expresses understanding and has no further questions at this time.    Beatrice Rice, [facility]     no

## 2021-07-13 NOTE — TELEPHONE ENCOUNTER
[facility]: I know we dont have any later that day but do we have a 1:40 on another day that might help.

## 2021-07-13 NOTE — TELEPHONE ENCOUNTER
----- Message from Brittany Villeda sent at 7/12/2021  8:54 PM EDT -----  Regarding: Non-Urgent Medical Question  Contact: [phone]  Dr Culp has a virtual Monday July 19th @10:00 am. I am asking to move the appointment to later in the afternoon.

## 2021-07-14 NOTE — TELEPHONE ENCOUNTER
I spoke with patient's daughter, Amina to move to another day/ later time. Offered first later time on 10/8, she stated they would keep appointment on 7/19.     Thank you  Carmen

## 2021-07-15 RX ORDER — MESALAMINE 0.38 G/1
CAPSULE, EXTENDED RELEASE ORAL
Qty: 360 CAPSULE | Refills: 0 | Status: SHIPPED | OUTPATIENT
Start: 2021-07-15 | End: 2021-11-10 | Stop reason: SDUPTHER

## 2021-07-15 RX ORDER — OMEPRAZOLE 20 MG/1
CAPSULE, DELAYED RELEASE ORAL
Qty: 90 CAPSULE | Refills: 1 | Status: SHIPPED | OUTPATIENT
Start: 2021-07-15 | End: 2021-07-25

## 2021-07-19 ENCOUNTER — TELEMEDICINE (OUTPATIENT)
Dept: CARDIOLOGY | Facility: CLINIC | Age: 86
End: 2021-07-19

## 2021-07-19 ENCOUNTER — READMISSION MANAGEMENT (OUTPATIENT)
Dept: CALL CENTER | Facility: HOSPITAL | Age: 86
End: 2021-07-19

## 2021-07-19 ENCOUNTER — TELEPHONE (OUTPATIENT)
Dept: GASTROENTEROLOGY | Facility: CLINIC | Age: 86
End: 2021-07-19

## 2021-07-19 VITALS
HEART RATE: 72 BPM | WEIGHT: 201 LBS | DIASTOLIC BLOOD PRESSURE: 70 MMHG | SYSTOLIC BLOOD PRESSURE: 124 MMHG | HEIGHT: 64 IN | BODY MASS INDEX: 34.31 KG/M2

## 2021-07-19 DIAGNOSIS — I10 ESSENTIAL HYPERTENSION: ICD-10-CM

## 2021-07-19 DIAGNOSIS — I47.1 PAROXYSMAL SVT (SUPRAVENTRICULAR TACHYCARDIA) (HCC): ICD-10-CM

## 2021-07-19 DIAGNOSIS — E87.6 HYPOKALEMIA: Primary | ICD-10-CM

## 2021-07-19 DIAGNOSIS — Z86.79 HISTORY OF ATRIAL FIBRILLATION: ICD-10-CM

## 2021-07-19 DIAGNOSIS — G47.33 OSA (OBSTRUCTIVE SLEEP APNEA): ICD-10-CM

## 2021-07-19 DIAGNOSIS — I50.31 ACUTE DIASTOLIC CHF (CONGESTIVE HEART FAILURE) (HCC): ICD-10-CM

## 2021-07-19 LAB — INR PPP: 1.8

## 2021-07-19 PROCEDURE — 99213 OFFICE O/P EST LOW 20 MIN: CPT | Performed by: INTERNAL MEDICINE

## 2021-07-19 NOTE — PROGRESS NOTES
Date of Office Visit: 2021  Encounter Provider: Mary Grace Culp MD  Place of Service: [facility] CARDIOLOGY  Patient Name: Brittany Villeda  :1935    This patient has consented to a telehealth visit via phone. The visit was scheduled as a telehealth video visit to comply with patient safety concerns in accordance with CDC recommendations.  All vitals recorded within this visit are reported by the patient.  I spent  22 minutes in total including but not limited to the 18minutes spent in direct conversation with this patient.     Chief complaint  Paroxysmal atrial fibrillation, hypertension, borderline dilated aortic root, sick sinus syndrome status post pacemaker placement    History of Present Illness  The patient is a pleasant, 85-year-old female with a history of hypertension, obstructive sleep apnea, obesity, immobility, pulmonary hypertension, history of nonsustained ventricular tachycardia, and obstructive sleep apnea.  She has a history of diastolic dysfunction.  In , she had nonsustained ventricular tachycardia.  A stress perfusion study was negative for ischemia.  An echocardiogram revealed normal left ventricular size and function with moderate left ventricular hypertrophy. In 2013, she was seen for persistent dizziness in the setting of new-onset paroxysmal atrial fibrillation.  She was admitted to the hospital and not felt to have had a stroke.  Her symptoms actually resolved with ear manipulation and were felt to be more vestibular in nature.  She also developed paroxysmal atrial fibrillation with early bradycardia which resolved with treatment of her sleep apnea and AV flaco blocker therapy.  She was placed on warfarin.  She also had a heme-positive stool with anemia and was seen by the gastrointestinal doctors and EGD and colonoscopy were performed. The EGD revealed mild erythema but otherwise stable.  She initiated therapy with a BiPAP.  In December  2017 presented to the emergency room with chest pain.  She ruled out for myocardial infarction.  A stress perfusion study that was negative for ischemia with normal systolic function.  A Ziopatch revealed sinus rhythm with episodes of supra-ventricular tachycardia that was symptomatic.  In September 2018 EKG showed pauses in the setting of bifascicular block.  A 24-hour Holter revealed 65 pauses 3 seconds longest induration.  Sinus rhythm was present throughout most of the study.  With complaints of palpitations PACs were noted.  Current 22.2% of the monitor time there is also 14 episodes of atrial tachycardia lasting 4 beats.  There were also episodes of 2-1 AV block and questionable third degree.  An echocardiogram revealed normal systolic function grade 1 diastolic dysfunction, borderline right ventricular enlargement, mild to moderate tricuspid valve regurgitation with an RV systolic pressure 48 mmHg.  The ascending aorta was mildly dilated at 3.8 cm.  I recommended a pacemaker placement but she deferred in which she discuss this further with her daughter and is here today regarding this.  By September 2018 she was noted to have more symptomatic bradycardia and after much discussion she underwent pacemaker placement in November 2018.  Echocardiogram in 4/21 showed an ejection fraction of 60% with right ventricular dysfunction right atrial enlargement with severe left atrial enlargement.  There is mild tricuspid valve regurgitation with normal right-sided pressures.  Saline injection was negative.CT angiogram of the chest and abdomen was performed on 5/21 that showed no pulmonary emboli stable dilated ascending aorta (4.1 cm) with elevated right hemidiaphragm with atelectasis.      Patient has had no chest pain, shortness of breath, palpitations, syncope near syncope.  She has not received her Covid vaccine.  She on her daughter states she is not leaving her house very often.  INR was checked recently and stable  however follow-up BMP has not been checked as best I can tell.  Blood pressure has been as it is today    Past Medical History:   Diagnosis Date   • Acute UTI (urinary tract infection) 4/8/2021   • Anemia    • Arrhythmia    • Arthritis    • Asthma    • Bradycardia    • Chest pain    • Choledocholithiasis 5/9/2021   • Colitis    • COPD (chronic obstructive pulmonary disease) (CMS/HCC)    • Diastolic dysfunction    • Essential hypertension 5/12/2016   • GERD (gastroesophageal reflux disease)    • Heart block    • Hypertension    • Hypertensive heart disease    • Hyperthyroidism    • Kidney stone    • Leukopenia    • Low back pain    • MGUS (monoclonal gammopathy of unknown significance)    • Nephrolithiasis    • Obesity    • Paroxysmal atrial fibrillation (CMS/HCC)    • Peptic ulceration    • Persistent atrial fibrillation (CMS/HCC)    • PSVT (paroxysmal supraventricular tachycardia) (CMS/HCC)    • Pulmonary hypertension (CMS/HCC)    • Rectal bleed    • Sleep apnea    • Systemic hypertension    • Trifascicular block    • 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)    • Ventricular tachycardia (CMS/HCC)     nonsustained   • Vertigo      Past Surgical History:   Procedure Laterality Date   • BACK SURGERY      lumbar fusion   • CARDIAC ELECTROPHYSIOLOGY PROCEDURE N/A 11/7/2018    Procedure: Pacemaker DC new   [place];  Surgeon: Jesus Granda MD;  Location: [facility] CATH INVASIVE LOCATION;  Service: Cardiology   • CHOLECYSTECTOMY     • COLONOSCOPY  06/16/2014    colitis, cryptitis,  tics, NBIH, TA w/low grade dysplasia   • COLONOSCOPY N/A 10/28/2019    Procedure: COLONOSCOPY WITH COLD AND HOT POLYPECTOMIES;  Surgeon: Micaela English MD;  Location: [facility] ENDOSCOPY;  Service: Gastroenterology   • ENDOSCOPY N/A 5/13/2021    Procedure: ESOPHAGOGASTRODUODENOSCOPY with BX;  Surgeon: Stefan Mcfadden MD;  Location: [facility] ENDOSCOPY;  Service: Gastroenterology;  Laterality: N/A;  EPIGASTRIC  PAIN  --DUODENAL ULCER, HEMORRHAGIC GASTRITIS, HIATAL HERNIA    • HEMORRHOIDECTOMY     • HYSTERECTOMY     • JOINT REPLACEMENT     • KNEE ARTHROPLASTY     • MYOMECTOMY     • PACEMAKER IMPLANTATION     • SHOULDER SURGERY     • SINUS SURGERY     • TOE NAIL AMPUTATION  03/04/2019   • TONSILLECTOMY       Outpatient Medications Prior to Visit   Medication Sig Dispense Refill   • acetaminophen (TYLENOL) 500 MG tablet Take 1 tablet by mouth Every 6 (Six) Hours As Needed for Mild Pain .     • docusate sodium (COLACE) 100 MG capsule Take 100 mg by mouth Every Night.     • furosemide (LASIX) 20 MG tablet Take 1 tablet by mouth Daily. 30 tablet 0   • mesalamine (APRISO) 0.375 g 24 hr capsule TAKE 4 CAPSULES DAILY 360 capsule 0   • pantoprazole (Protonix) 40 MG EC tablet Take 1 tablet by mouth 2 (Two) Times a Day. 60 tablet 3   • sucralfate (CARAFATE) 1 g tablet Take 1 tablet by mouth 4 (Four) Times a Day Before Meals & at Bedtime. 120 tablet 3   • vitamin B-12 (CYANOCOBALAMIN) 1000 MCG tablet Take 1,000 mcg by mouth Daily.     • warfarin (COUMADIN) 2.5 MG tablet Take 2.5 mg by mouth Every Night. 5mg on Monday and Thursday     • albuterol sulfate  (90 Base) MCG/ACT inhaler Inhale 2 puffs Every 6 (Six) Hours As Needed for Wheezing. (Patient taking differently: Inhale 2 puffs Every 6 (Six) Hours As Needed for Wheezing (NOT TAKEN  FOR 3 YEARS).) 1 inhaler 0   • magnesium gluconate (MAGONATE) 30 MG tablet Take 1 tablet by mouth Daily. 90 tablet 1   • omeprazole (priLOSEC) 20 MG capsule TAKE 1 CAPSULE EVERY DAY 90 capsule 1   • warfarin (COUMADIN) 1 MG tablet Take 5 mg by mouth 2 (Two) Times a Week. 1mg on Monday and Saturday     • Wheat Dextrin (BENEFIBER DRINK MIX PO) Take  by mouth Every 2 (Two) Hours As Needed.       No facility-administered medications prior to visit.       Allergies as of 07/19/2021 - Reviewed 07/19/2021   Allergen Reaction Noted   • Codeine Hallucinations 11/30/2017   • Amitriptyline Rash 05/12/2016   •  "Amoxicillin-pot clavulanate Rash 2016   • Aspirin Unknown - Low Severity 2016   • Bactrim [sulfamethoxazole-trimethoprim] Rash 2016   • Carisoprodol-aspirin-codeine Palpitations 2016   • Iodinated diagnostic agents Rash 2016   • Latex Rash 2016   • Naproxen Rash 2016   • Nsaids Unknown - Low Severity 2016   • Soma compound with codeine [carisoprodol-aspirin-codeine] Rash 2016   • Sulfa antibiotics Rash 2016   • Tramadol Palpitations 2019     Social History     Socioeconomic History   • Marital status:      Spouse name: Not on file   • Number of children: 10   • Years of education: High School   • Highest education level: Not on file   Tobacco Use   • Smoking status: Former Smoker     Packs/day: 1.50     Years: 10.00     Pack years: 15.00     Start date:      Quit date:      Years since quittin.6   • Smokeless tobacco: Never Used   • Tobacco comment: QUIT SMOKING    Vaping Use   • Vaping Use: Never used   Substance and Sexual Activity   • Alcohol use: No   • Drug use: Defer   • Sexual activity: Defer     Family History   Problem Relation Age of Onset   • Diabetes Mother    • Breast cancer Sister    • Kidney cancer Sister    • Heart disease Sister    • Prostate cancer Brother    • Prostate cancer Brother    • Prostate cancer Brother    • Malig Hyperthermia Neg Hx      Review of Systems   Cardiovascular: Negative for chest pain, dyspnea on exertion, leg swelling, palpitations and syncope.        Objective:     Vitals:    21 1410   BP: 124/70   Pulse: 72   Weight: 91.2 kg (201 lb)   Height: 162.6 cm (64\")     Body mass index is 34.5 kg/m².    Physical Exam not performed as this is a telehealth visit    Assessment:       Diagnosis Plan   1. Hypokalemia  Basic Metabolic Panel    Magnesium   2. Acute diastolic CHF (congestive heart failure) (CMS/HCC)  proBNP   3. Paroxysmal SVT (supraventricular tachycardia) (CMS/HCC)     4. " History of atrial fibrillation     5. Essential hypertension     6. HUGO (obstructive sleep apnea)       Plan:       1.  Persistent atrial fibrillation with history of paroxysmal supraventricular tachycardia.  Continue with warfarin.  Rates appear to be controlled.  2.  Status post permanent pacemaker placement.  Device check next week  3.  History of diastolic dysfunction.  No symptoms of heart failure.  We will check a proBNP.  Will have her return for follow-up in office in 3 months.  4.  Hypokalemia.  Will check BMP and magnesium with home health in 3 days..  Patient's daughter states he never picked up the magnesium as the pharmacy stated it was on back order  5.  Dilated asending aorta able by CT in August 2020.  We will check follow-up CT scan in 6 months  6.  Pulmonary hypertension.  Clinically stable  7.  Untreated HUGO, untreated  8.  Rectal bleeding with hemorroids.  No recent events  9.  Mediastinal adenopathy.  Followed by Dr. Walter  10.  Debilitated state.  Slowly ambulating around her home.      Time Spent: I spent 20 minutes caring for Brittany on this date of service. This time includes time spent by me in the following activities: preparing for the visit, reviewing tests, obtaining and/or reviewing a separately obtained history, performing a medically appropriate examination and/or evaluation, counseling and educating the patient/family/caregiver, documenting information in the medical record and independently interpreting results and communicating that information with the patient/family/caregiver.   I spent 1 minutes on the separately reported service of ECG. This time is not included in the time used to support the E/M service also reported today.        Your medication list          Accurate as of July 19, 2021 11:59 PM. If you have any questions, ask your nurse or doctor.            CHANGE how you take these medications      Instructions Last Dose Given Next Dose Due   warfarin 2.5 MG  tablet  Commonly known as: COUMADIN  What changed: Another medication with the same name was removed. Continue taking this medication, and follow the directions you see here.  Changed by: Mary Grace Culp MD      Take 2.5 mg by mouth Every Night. 5mg on Monday and Thursday          CONTINUE taking these medications      Instructions Last Dose Given Next Dose Due   acetaminophen 500 MG tablet  Commonly known as: TYLENOL      Take 1 tablet by mouth Every 6 (Six) Hours As Needed for Mild Pain .       docusate sodium 100 MG capsule  Commonly known as: COLACE      Take 100 mg by mouth Every Night.       furosemide 20 MG tablet  Commonly known as: LASIX      Take 1 tablet by mouth Daily.       mesalamine 0.375 g 24 hr capsule  Commonly known as: APRISO      TAKE 4 CAPSULES DAILY       pantoprazole 40 MG EC tablet  Commonly known as: Protonix      Take 1 tablet by mouth 2 (Two) Times a Day.       sucralfate 1 g tablet  Commonly known as: CARAFATE      Take 1 tablet by mouth 4 (Four) Times a Day Before Meals & at Bedtime.       vitamin B-12 1000 MCG tablet  Commonly known as: CYANOCOBALAMIN      Take 1,000 mcg by mouth Daily.          STOP taking these medications    albuterol sulfate  (90 Base) MCG/ACT inhaler  Commonly known as: PROVENTIL HFA;VENTOLIN HFA;PROAIR HFA  Stopped by: Mary Grace Culp MD        BENEFIBER DRINK MIX PO  Stopped by: Mary Grace Culp MD        magnesium gluconate 30 MG tablet  Commonly known as: MAGONATE  Stopped by: Mary Grace Culp MD        omeprazole 20 MG capsule  Commonly known as: priLOSEC  Stopped by: Mary Grace Culp MD               Patient is no longer taking -.  I corrected the med list to reflect this.  I did not stop these medications.      Dictated utilizing Dragon dictation

## 2021-07-19 NOTE — OUTREACH NOTE
Medical Week 4 Survey      Responses   [facility] patient discharged from?  [place]   Does the patient have one of the following disease processes/diagnoses(primary or secondary)?  Other   Week 4 attempt successful?  No          Chely Mooney RN

## 2021-07-20 PROCEDURE — 93296 REM INTERROG EVL PM/IDS: CPT | Performed by: INTERNAL MEDICINE

## 2021-07-20 PROCEDURE — 93294 REM INTERROG EVL PM/LDLS PM: CPT | Performed by: INTERNAL MEDICINE

## 2021-07-21 ENCOUNTER — ANTICOAGULATION VISIT (OUTPATIENT)
Dept: PHARMACY | Facility: HOSPITAL | Age: 86
End: 2021-07-21

## 2021-07-21 ENCOUNTER — TELEPHONE (OUTPATIENT)
Dept: GASTROENTEROLOGY | Facility: CLINIC | Age: 86
End: 2021-07-21

## 2021-07-22 NOTE — PROGRESS NOTES
Anticoagulation Clinic Progress Note    Anticoagulation Summary  As of 2021    INR goal:  2.0-3.0   TTR:  68.1 % (2.8 y)   INR used for dosin.8 (2021)   Warfarin maintenance plan:  5 mg every Mon, Thu, Sat; 2.5 mg all other days   Weekly warfarin total:  25 mg   Plan last modified:  Rusty Interiano, PharmD (2021)   Next INR check:  2021   Priority:  Maintenance   Target end date:  Indefinite    Indications    Atrial fibrillation (CMS/HCC) [I48.91]             Anticoagulation Episode Summary     INR check location:      Preferred lab:      Send INR reminders to:  [facility] [place]    Comments:        Anticoagulation Care Providers     Provider Role Specialty Phone number    Mary Grace Culp MD Referring Cardiology [phone]          INR History:  Anticoagulation Monitoring 2021   INR 1.80 2.00 1.8   INR Date 2021   INR Goal 2.0-3.0 2.0-3.0 2.0-3.0   Trend Same Same Up   Last Week Total 22.5 mg 25 mg 22.5 mg   Next Week Total 25 mg 22.5 mg 25 mg   Sun 2.5 mg 2.5 mg 2.5 mg   Mon 5 mg - -   Tue 5 mg () 2.5 mg -   Wed 2.5 mg 2.5 mg 2.5 mg   Thu 5 mg 5 mg 5 mg   Fri 2.5 mg 2.5 mg 2.5 mg   Sat 2.5 mg 2.5 mg 5 mg   Visit Report - - -   Some recent data might be hidden       Plan:  1. INR was Subtherapeutic 21 - see above in Anticoagulation Summary.   Provided instructions to Maria Antonia with [facility] to Increase their warfarin regimen- see above in Anticoagulation Summary.  2. Follow up in 1 week      Rusty Interiano, JamarD

## 2021-07-23 ENCOUNTER — TELEPHONE (OUTPATIENT)
Dept: CARDIOLOGY | Facility: CLINIC | Age: 86
End: 2021-07-23

## 2021-07-23 NOTE — TELEPHONE ENCOUNTER
Caller: KATELYN WITH [facility]    Relationship: PHYSICAL THERAPIST    Best call back number: [phone]    What orders are you requesting (i.e. lab or imaging): ORDERS FOR CONTINUED PHYSICAL THERAPY    In what timeframe would the patient need to come in: ASAP    Where will you receive your lab/imaging services: [facility]     Additional notes: KATELYN WITH [facility] CALLED TO REQUEST AN ORDER TO CONTINUE PHYSICAL THERAPY WITH PATIENT-2 X FOR 5 WEEKS AND 1 X WEEK FOR 3 WEEKS         THANKS

## 2021-07-26 LAB — INR PPP: 2.1

## 2021-07-27 ENCOUNTER — TELEPHONE (OUTPATIENT)
Dept: CARDIOLOGY | Facility: CLINIC | Age: 86
End: 2021-07-27

## 2021-07-27 DIAGNOSIS — E83.42 HYPOMAGNESEMIA: Primary | ICD-10-CM

## 2021-07-28 ENCOUNTER — ANTICOAGULATION VISIT (OUTPATIENT)
Dept: PHARMACY | Facility: HOSPITAL | Age: 86
End: 2021-07-28

## 2021-07-28 NOTE — TELEPHONE ENCOUNTER
Dr. Culp out of the office today.  Please let patient know that hemoglobin within normal limits on recent labs.  White blood cell count was a little low would have her discuss this with primary care and continue to follow with primary care for continued monitoring and recommendations.  Would keep July follow-up with Dr. Culp as scheduled or follow-up sooner for symptoms or concerns prior to that time.   TRANSFER - IN REPORT:    Verbal report received from Silvestre Groves RN(name) on Patrice Bray  being received from [facility](unit) for urgent transfer      Report consisted of patients Situation, Background, Assessment and   Recommendations(SBAR). Information from the following report(s) SBAR, Kardex, OR Summary, Procedure Summary, Intake/Output, MAR, Recent Results, Med Rec Status, Cardiac Rhythm NSR, Alarm Parameters  and Dual Neuro Assessment was reviewed with the receiving nurse. Primary Nurse Fide Mckeon RN and SULEIMAN Mercado performed a dual skin assessment on this patient No impairment noted        1200: Patient transfered into ICU bed 19 from [facility] -post code blue. On STEVE gtt for hypotension and reaching MAP goal >65. Intubated, NGT placed before transfer. Distended, hard abdomen. General Surgery paged to assess. Cannot obtain temperature, temp sensing alonso placed. Temp recorded 92.0. Bear hugger applied. Potassium replacement ordered, Albumin for hypotension. Vit K ordered per Dr. Benjamin Acuna. Xray at bedside for KUB for abd distention and NGT placement. 1300: Central Line placement. Xray ordered for verification. Order for Vaso and Levo, titrate off Steve.     1440: Patient off unit to OR for Laparotomy Exploratory per Dr. Benjamin Acuna.     36:  TRANSFER - IN REPORT:    Verbal report received from Paul Martins (name) on Patrice Bray  being received from OR(unit) for routine post - op      Report consisted of patients Situation, Background, Assessment and   Recommendations(SBAR). Information from the following report(s) SBAR, Kardex, OR Summary, Procedure Summary, Intake/Output, Med Rec Status and Alarm Parameters  was reviewed with the receiving nurse. Patient off STEVE arriving to room from OR. Nurse reported they titrated off STEVE in OR. Vaso at 0.035, and Levo at 12. Arterial Line and Left Felton drain placed in OR.        1933: Critical Lab called form Laboratory:  Lactic 8.2. Passed along to night shift SULEIMAN Morocho. Bedside and Verbal shift change report [facility] [address] (oncoming nurse) by Christopher Doran RN (offgoing nurse). Report included the following information SBAR, Kardex, Procedure Summary, Intake/Output, MAR, Recent Results, Med Rec Status, Alarm Parameters  and Dual Neuro Assessment.

## 2021-07-28 NOTE — PROGRESS NOTES
Anticoagulation Clinic Progress Note    Anticoagulation Summary  As of 2021    INR goal:  2.0-3.0   TTR:  67.8 % (2.8 y)   INR used for dosin.10 (2021)   Warfarin maintenance plan:  5 mg every Mon, Thu, Sat; 2.5 mg all other days   Weekly warfarin total:  25 mg   No change documented:  Rusty Interiano, Jose Eduardo   Plan last modified:  Rusty Interiano PharmD (2021)   Next INR check:  2021   Priority:  Maintenance   Target end date:  Indefinite    Indications    Atrial fibrillation (CMS/HCC) [I48.91]             Anticoagulation Episode Summary     INR check location:      Preferred lab:      Send INR reminders to:   [facility] [place]    Comments:        Anticoagulation Care Providers     Provider Role Specialty Phone number    Mary Grace Culp MD Referring Cardiology [phone]          INR History:  Anticoagulation Monitoring 2021   INR 2.00 1.8 2.10   INR Date 2021   INR Goal 2.0-3.0 2.0-3.0 2.0-3.0   Trend Same Up Same   Last Week Total 25 mg 22.5 mg 25 mg   Next Week Total 22.5 mg 25 mg 25 mg   Sun 2.5 mg 2.5 mg 2.5 mg   Mon - - -   Tue 2.5 mg - -   Wed 2.5 mg 2.5 mg 2.5 mg   Thu 5 mg 5 mg 5 mg   Fri 2.5 mg 2.5 mg 2.5 mg   Sat 2.5 mg 5 mg 5 mg   Visit Report - - -   Some recent data might be hidden       Plan:  1. INR was Therapeutic 21 - see above in Anticoagulation Summary.   Provided instructions to Maria Antonia [facility] to Continue their warfarin regimen- see above in Anticoagulation Summary.  2. Follow up in 1 week      Rusty Interiano PharmD

## 2021-07-29 ENCOUNTER — OFFICE VISIT (OUTPATIENT)
Dept: FAMILY MEDICINE CLINIC | Facility: CLINIC | Age: 86
End: 2021-07-29

## 2021-07-29 VITALS
HEIGHT: 64 IN | SYSTOLIC BLOOD PRESSURE: 120 MMHG | WEIGHT: 204 LBS | OXYGEN SATURATION: 97 % | TEMPERATURE: 97.3 F | DIASTOLIC BLOOD PRESSURE: 70 MMHG | BODY MASS INDEX: 34.83 KG/M2 | HEART RATE: 85 BPM

## 2021-07-29 DIAGNOSIS — Z74.09 IMMOBILITY: ICD-10-CM

## 2021-07-29 DIAGNOSIS — E87.6 HYPOKALEMIA: Primary | ICD-10-CM

## 2021-07-29 DIAGNOSIS — E87.6 HYPOKALEMIA: ICD-10-CM

## 2021-07-29 DIAGNOSIS — I48.91 ATRIAL FIBRILLATION, UNSPECIFIED TYPE (HCC): ICD-10-CM

## 2021-07-29 DIAGNOSIS — I10 ESSENTIAL HYPERTENSION: Primary | ICD-10-CM

## 2021-07-29 DIAGNOSIS — I50.31 ACUTE DIASTOLIC CHF (CONGESTIVE HEART FAILURE) (HCC): ICD-10-CM

## 2021-07-29 DIAGNOSIS — E21.3 HYPERPARATHYROIDISM (HCC): ICD-10-CM

## 2021-07-29 DIAGNOSIS — K21.9 GASTROESOPHAGEAL REFLUX DISEASE WITHOUT ESOPHAGITIS: Chronic | ICD-10-CM

## 2021-07-29 PROBLEM — Z79.01 CHRONIC ANTICOAGULATION: Chronic | Status: ACTIVE | Noted: 2020-11-20

## 2021-07-29 PROBLEM — K51.30 ULCERATIVE RECTOSIGMOIDITIS WITHOUT COMPLICATION: Chronic | Status: ACTIVE | Noted: 2017-11-30

## 2021-07-29 PROBLEM — R53.83 OTHER MALAISE AND FATIGUE: Status: ACTIVE | Noted: 2021-05-25

## 2021-07-29 PROBLEM — R53.2 FUNCTIONAL QUADRIPLEGIA: Status: ACTIVE | Noted: 2020-11-20

## 2021-07-29 PROBLEM — R53.81 OTHER MALAISE AND FATIGUE: Status: ACTIVE | Noted: 2021-05-25

## 2021-07-29 PROBLEM — M25.559 HIP PAIN: Status: ACTIVE | Noted: 2018-04-12

## 2021-07-29 PROBLEM — M25.519 SHOULDER PAIN: Status: ACTIVE | Noted: 2018-04-12

## 2021-07-29 PROBLEM — M19.019 OSTEOARTHRITIS OF GLENOHUMERAL JOINT: Status: ACTIVE | Noted: 2018-07-12

## 2021-07-29 PROBLEM — I71.21 ASCENDING AORTIC ANEURYSM: Chronic | Status: ACTIVE | Noted: 2020-08-24

## 2021-07-29 PROCEDURE — 99214 OFFICE O/P EST MOD 30 MIN: CPT | Performed by: FAMILY MEDICINE

## 2021-07-29 RX ORDER — FUROSEMIDE 20 MG/1
20 TABLET ORAL DAILY
Qty: 90 TABLET | Refills: 1 | Status: SHIPPED | OUTPATIENT
Start: 2021-07-29 | End: 2021-08-14 | Stop reason: HOSPADM

## 2021-07-29 RX ORDER — PANTOPRAZOLE SODIUM 40 MG/1
40 TABLET, DELAYED RELEASE ORAL 2 TIMES DAILY
Qty: 90 TABLET | Refills: 1 | Status: SHIPPED | OUTPATIENT
Start: 2021-07-29 | End: 2021-10-29

## 2021-07-29 NOTE — PROGRESS NOTES
"Chief Complaint  Hypertension (follow up )    Subjective          Brittany Villeda presents to [facility]  History of Present Illness    Follow-up hypertension.  She continues on Lasix 20 mg daily.  Pressures have been running normal.    Immobility.  She is out of the bed now.  She is been walking around her house and the deck.  She is in the office now for the first time in a number of months.  She states she has no pressure sores or other breakdown of the skin.    Paroxysmal atrial fibrillation.  She continues on warfarin.  And Lasix.  She is not on a beta-blocker.  Her INR has been managed by cardiology and has been therapeutic.    Diastolic dysfunction.  Also followed by cardiology.  She continues on Lasix.  She has had no worsening of dyspnea on exertion or other cardiopulmonary symptoms in recent days.    Ulcerative colitis.  She continues on mesalamine prescribed by her gastroenterologist.  She states she is not had trouble with diarrhea or constipation.    GERD.  She continues pantoprazole.  She does not like taking the soccer fate.  And her GI symptoms have been much improved.  She has a little bit of coughing at nighttime but no GERD symptoms.    Objective   Vital Signs:   /70   Pulse 85   Temp 97.3 °F (36.3 °C) (Temporal)   Ht 162.6 cm (64.02\")   Wt 92.5 kg (204 lb)   SpO2 97%   BMI 35.00 kg/m²     Physical Exam  Vitals and nursing note reviewed.   Constitutional:       General: She is not in acute distress.     Appearance: She is well-developed. She is obese.   HENT:      Head: Atraumatic.   Eyes:      Conjunctiva/sclera: Conjunctivae normal.   Cardiovascular:      Rate and Rhythm: Normal rate.      Heart sounds: Normal heart sounds.      Comments: Irregularly irregular rate and rhythm  Pulmonary:      Effort: Pulmonary effort is normal.      Breath sounds: Normal breath sounds.   Musculoskeletal:      Right lower leg: No edema.      Left lower leg: No edema. "   Skin:     General: Skin is warm and dry.      Findings: No rash.   Neurological:      General: No focal deficit present.      Mental Status: She is oriented to person, place, and time.   Psychiatric:         Behavior: Behavior normal.         Thought Content: Thought content normal.         Judgment: Judgment normal.        Result Review :                 Assessment and Plan    Diagnoses and all orders for this visit:    1. Essential hypertension (Primary)    2. Atrial fibrillation, unspecified type (CMS/HCC)    3. Hyperparathyroidism (CMS/HCC)    4. Gastroesophageal reflux disease without esophagitis    5. Immobility    Other orders  -     furosemide (LASIX) 20 MG tablet; Take 1 tablet by mouth Daily.  Dispense: 90 tablet; Refill: 1  -     pantoprazole (Protonix) 40 MG EC tablet; Take 1 tablet by mouth 2 (Two) Times a Day.  Dispense: 90 tablet; Refill: 1      Hypertension.  Well-controlled.  Continue furosemide.    Atrial fibrillation.  Paroxysmal.  She continues on anticoagulation.  Does not need rate control at this time.  Anticoagulation monitored by cardiology.    Diastolic dysfunction.  She continues on furosemide.  Followed by cardiology.    GERD.  Karen stopped the  fate.  At this time she no longer needs it.  She will continue on the pantoprazole.  They are going to buy over-the-counter magnesium supplementation.    Immobility.  Obesity.  She is doing much better.  It is good to see her in the office.    Mediastinal adenopathy.  She has had 2 CT scans of the chest in the last year.  They are now unchanged.    I will see her back in 6 months for annual Medicare wellness visit with more blood work then if needed.  Also we are doing the blood work ordered by the cardiologist today.      Follow Up   No follow-ups on file.  Patient was given instructions and counseling regarding her condition or for health maintenance advice. Please see specific information pulled into the AVS if appropriate.

## 2021-07-30 LAB
BUN SERPL-MCNC: 8 MG/DL (ref 8–27)
BUN/CREAT SERPL: 11 (ref 12–28)
CALCIUM SERPL-MCNC: 9.9 MG/DL (ref 8.7–10.3)
CHLORIDE SERPL-SCNC: 103 MMOL/L (ref 96–106)
CO2 SERPL-SCNC: 23 MMOL/L (ref 20–29)
CREAT SERPL-MCNC: 0.71 MG/DL (ref 0.57–1)
GLUCOSE SERPL-MCNC: 103 MG/DL (ref 65–99)
MAGNESIUM SERPL-MCNC: 1.4 MG/DL (ref 1.6–2.3)
NT-PROBNP SERPL-MCNC: 1422 PG/ML (ref 0–738)
POTASSIUM SERPL-SCNC: 3.8 MMOL/L (ref 3.5–5.2)
SODIUM SERPL-SCNC: 141 MMOL/L (ref 134–144)

## 2021-08-02 ENCOUNTER — ANTICOAGULATION VISIT (OUTPATIENT)
Dept: PHARMACY | Facility: HOSPITAL | Age: 86
End: 2021-08-02

## 2021-08-02 LAB — INR PPP: 3.2

## 2021-08-02 RX ORDER — MAGNESIUM OXIDE 400 MG/1
400 TABLET ORAL DAILY
Qty: 30 TABLET | Refills: 0 | Status: SHIPPED | OUTPATIENT
Start: 2021-08-02 | End: 2021-10-20 | Stop reason: SDUPTHER

## 2021-08-02 NOTE — TELEPHONE ENCOUNTER
Patient notified of results and recommendations and verbalized understanding  No ss of CHF= no edema- no wt gain- SOA baseline. Restricting sodium and water intake  Gricelda Ngo RN  Triage nurse

## 2021-08-02 NOTE — TELEPHONE ENCOUNTER
Dr. Culp--- looks like patient denies edema or weight gain and chronic shortness of breath is at baseline.  Was going to lean towards keeping Lasix dose the same for right now unless you feel otherwise?  Looks like you recently had an office visit with her and ordered labs so I will defer to you.

## 2021-08-02 NOTE — PROGRESS NOTES
Anticoagulation Clinic Progress Note    Anticoagulation Summary  As of 8/2/2021    INR goal:  2.0-3.0   TTR:  67.9 % (2.8 y)   INR used for dosing:  3.20 (8/2/2021)   Warfarin maintenance plan:  5 mg every Mon, Thu, Sat; 2.5 mg all other days   Weekly warfarin total:  25 mg   Plan last modified:  Rusty Interiano, PharmD (7/21/2021)   Next INR check:  8/9/2021   Priority:  Maintenance   Target end date:  Indefinite    Indications    Atrial fibrillation (CMS/HCC) [I48.91]             Anticoagulation Episode Summary     INR check location:      Preferred lab:      Send INR reminders to:  [facility] [place] CLINICAL [place]    Comments:        Anticoagulation Care Providers     Provider Role Specialty Phone number    Mary rGace Culp MD Referring Cardiology [phone]          INR History:  Anticoagulation Monitoring 7/21/2021 7/28/2021 8/2/2021   INR 1.8 2.10 3.20   INR Date 7/19/2021 7/26/2021 8/2/2021   INR Goal 2.0-3.0 2.0-3.0 2.0-3.0   Trend Up Same Same   Last Week Total 22.5 mg 25 mg 25 mg   Next Week Total 25 mg 25 mg 22.5 mg   Sun 2.5 mg 2.5 mg 2.5 mg   Mon - - 2.5 mg (8/2)   Tue - - 2.5 mg   Wed 2.5 mg 2.5 mg 2.5 mg   Thu 5 mg 5 mg 5 mg   Fri 2.5 mg 2.5 mg 2.5 mg   Sat 5 mg 5 mg 5 mg   Visit Report - - -   Some recent data might be hidden       Plan:  1. INR is Supratherapeutic today- see above in Anticoagulation Summary.   Provided instructions to [facility] to Change their warfarin regimen- see above in Anticoagulation Summary.  2. Follow up in 1 week      Rusty Interiano, JamarD

## 2021-08-02 NOTE — TELEPHONE ENCOUNTER
Triage RN---   please let patient know that magnesium level is still low.  I have sent in magnesium oxide 400 mg a day to her pharmacy.  If this is still on back order through pharmacy then she can pick this up over-the-counter and start taking 1 pill a day.  Need to recheck magnesium level in 1 to 2 weeks after she has been on this to ensure level has improved.  Kidney enzymes and electrolytes stable on metabolic panel.  Looks like fluid level test was slightly elevated.  Please see if she is still on low-dose Lasix and following water and sodium restriction and ensure she is not having signs/symptoms of heart failure.  If not then would continue to monitor and call if she develops these we will check with Dr. Culp to ensure there is nothing more she would like to add.

## 2021-08-03 NOTE — TELEPHONE ENCOUNTER
Spoke with pt's daughter (Amina).  Verbalized understanding.  No other questions.  Again re-iterated no more than 1.5 liters and under 2000mg of sodium.  (done)

## 2021-08-05 ENCOUNTER — HOSPITAL ENCOUNTER (INPATIENT)
Facility: HOSPITAL | Age: 86
LOS: 9 days | Discharge: HOME HEALTH | End: 2021-08-14
Attending: EMERGENCY MEDICINE | Admitting: INTERNAL MEDICINE

## 2021-08-05 ENCOUNTER — APPOINTMENT (OUTPATIENT)
Dept: CT IMAGING | Facility: HOSPITAL | Age: 86
End: 2021-08-05

## 2021-08-05 DIAGNOSIS — I60.9 SAH (SUBARACHNOID HEMORRHAGE) (HCC): ICD-10-CM

## 2021-08-05 DIAGNOSIS — S06.5XAA SDH (SUBDURAL HEMATOMA) (HCC): ICD-10-CM

## 2021-08-05 DIAGNOSIS — S06.5XAA SUBDURAL HEMATOMA (HCC): Primary | ICD-10-CM

## 2021-08-05 PROBLEM — I61.9 ICH (INTRACEREBRAL HEMORRHAGE) (HCC): Status: ACTIVE | Noted: 2021-08-05

## 2021-08-05 LAB
ALBUMIN SERPL-MCNC: 3.7 G/DL (ref 3.5–5.2)
ALBUMIN/GLOB SERPL: 1 G/DL
ALP SERPL-CCNC: 75 U/L (ref 39–117)
ALT SERPL W P-5'-P-CCNC: 6 U/L (ref 1–33)
ANION GAP SERPL CALCULATED.3IONS-SCNC: 8.7 MMOL/L (ref 5–15)
AST SERPL-CCNC: 20 U/L (ref 1–32)
BASOPHILS # BLD AUTO: 0.01 10*3/MM3 (ref 0–0.2)
BASOPHILS NFR BLD AUTO: 0.3 % (ref 0–1.5)
BILIRUB SERPL-MCNC: 0.7 MG/DL (ref 0–1.2)
BUN SERPL-MCNC: 7 MG/DL (ref 8–23)
BUN/CREAT SERPL: 11.7 (ref 7–25)
CALCIUM SPEC-SCNC: 10.3 MG/DL (ref 8.6–10.5)
CHLORIDE SERPL-SCNC: 102 MMOL/L (ref 98–107)
CO2 SERPL-SCNC: 25.3 MMOL/L (ref 22–29)
CREAT SERPL-MCNC: 0.6 MG/DL (ref 0.57–1)
DEPRECATED RDW RBC AUTO: 46.4 FL (ref 37–54)
EOSINOPHIL # BLD AUTO: 0.03 10*3/MM3 (ref 0–0.4)
EOSINOPHIL NFR BLD AUTO: 0.8 % (ref 0.3–6.2)
ERYTHROCYTE [DISTWIDTH] IN BLOOD BY AUTOMATED COUNT: 13.1 % (ref 12.3–15.4)
GFR SERPL CREATININE-BSD FRML MDRD: 115 ML/MIN/1.73
GLOBULIN UR ELPH-MCNC: 3.6 GM/DL
GLUCOSE BLDC GLUCOMTR-MCNC: 118 MG/DL (ref 70–130)
GLUCOSE SERPL-MCNC: 150 MG/DL (ref 65–99)
HCT VFR BLD AUTO: 39 % (ref 34–46.6)
HGB BLD-MCNC: 12.5 G/DL (ref 12–15.9)
INR PPP: 2.65 (ref 0.9–1.1)
INR PPP: 2.78 (ref 0.9–1.1)
LYMPHOCYTES # BLD AUTO: 1.59 10*3/MM3 (ref 0.7–3.1)
LYMPHOCYTES NFR BLD AUTO: 41.4 % (ref 19.6–45.3)
MCH RBC QN AUTO: 31 PG (ref 26.6–33)
MCHC RBC AUTO-ENTMCNC: 32.1 G/DL (ref 31.5–35.7)
MCV RBC AUTO: 96.8 FL (ref 79–97)
MONOCYTES # BLD AUTO: 0.26 10*3/MM3 (ref 0.1–0.9)
MONOCYTES NFR BLD AUTO: 6.8 % (ref 5–12)
NEUTROPHILS NFR BLD AUTO: 1.94 10*3/MM3 (ref 1.7–7)
NEUTROPHILS NFR BLD AUTO: 50.4 % (ref 42.7–76)
PLATELET # BLD AUTO: 216 10*3/MM3 (ref 140–450)
PMV BLD AUTO: 8.9 FL (ref 6–12)
POTASSIUM SERPL-SCNC: 3.9 MMOL/L (ref 3.5–5.2)
PROT SERPL-MCNC: 7.3 G/DL (ref 6–8.5)
PROTHROMBIN TIME: 28 SECONDS (ref 11.7–14.2)
PROTHROMBIN TIME: 29.1 SECONDS (ref 11.7–14.2)
RBC # BLD AUTO: 4.03 10*6/MM3 (ref 3.77–5.28)
SODIUM SERPL-SCNC: 136 MMOL/L (ref 136–145)
TROPONIN T SERPL-MCNC: <0.01 NG/ML (ref 0–0.03)
WBC # BLD AUTO: 3.84 10*3/MM3 (ref 3.4–10.8)

## 2021-08-05 PROCEDURE — 82962 GLUCOSE BLOOD TEST: CPT

## 2021-08-05 PROCEDURE — 85610 PROTHROMBIN TIME: CPT | Performed by: EMERGENCY MEDICINE

## 2021-08-05 PROCEDURE — 70450 CT HEAD/BRAIN W/O DYE: CPT

## 2021-08-05 PROCEDURE — 93010 ELECTROCARDIOGRAM REPORT: CPT | Performed by: INTERNAL MEDICINE

## 2021-08-05 PROCEDURE — 93005 ELECTROCARDIOGRAM TRACING: CPT | Performed by: EMERGENCY MEDICINE

## 2021-08-05 PROCEDURE — 25010000002 PROTHROMBIN COMPLEX CONC HUMAN 1000 UNITS KIT: Performed by: EMERGENCY MEDICINE

## 2021-08-05 PROCEDURE — U0004 COV-19 TEST NON-CDC HGH THRU: HCPCS | Performed by: EMERGENCY MEDICINE

## 2021-08-05 PROCEDURE — 99285 EMERGENCY DEPT VISIT HI MDM: CPT

## 2021-08-05 PROCEDURE — 25010000002 FENTANYL CITRATE (PF) 50 MCG/ML SOLUTION: Performed by: INTERNAL MEDICINE

## 2021-08-05 PROCEDURE — 80053 COMPREHEN METABOLIC PANEL: CPT | Performed by: EMERGENCY MEDICINE

## 2021-08-05 PROCEDURE — 85025 COMPLETE CBC W/AUTO DIFF WBC: CPT | Performed by: EMERGENCY MEDICINE

## 2021-08-05 PROCEDURE — 25010000003 PHYTONADIONE 10 MG/ML SOLUTION 1 ML AMPULE: Performed by: EMERGENCY MEDICINE

## 2021-08-05 PROCEDURE — 84484 ASSAY OF TROPONIN QUANT: CPT | Performed by: EMERGENCY MEDICINE

## 2021-08-05 RX ORDER — ONDANSETRON 2 MG/ML
4 INJECTION INTRAMUSCULAR; INTRAVENOUS EVERY 6 HOURS PRN
Status: DISCONTINUED | OUTPATIENT
Start: 2021-08-05 | End: 2021-08-14 | Stop reason: HOSPADM

## 2021-08-05 RX ORDER — ALUMINA, MAGNESIA, AND SIMETHICONE 2400; 2400; 240 MG/30ML; MG/30ML; MG/30ML
7.5 SUSPENSION ORAL EVERY 4 HOURS PRN
Status: DISCONTINUED | OUTPATIENT
Start: 2021-08-05 | End: 2021-08-14 | Stop reason: HOSPADM

## 2021-08-05 RX ORDER — SODIUM CHLORIDE 0.9 % (FLUSH) 0.9 %
10 SYRINGE (ML) INJECTION EVERY 12 HOURS SCHEDULED
Status: DISCONTINUED | OUTPATIENT
Start: 2021-08-05 | End: 2021-08-14 | Stop reason: HOSPADM

## 2021-08-05 RX ORDER — SODIUM CHLORIDE 0.9 % (FLUSH) 0.9 %
10 SYRINGE (ML) INJECTION AS NEEDED
Status: DISCONTINUED | OUTPATIENT
Start: 2021-08-05 | End: 2021-08-14 | Stop reason: HOSPADM

## 2021-08-05 RX ORDER — BISACODYL 10 MG
10 SUPPOSITORY, RECTAL RECTAL DAILY PRN
Status: DISCONTINUED | OUTPATIENT
Start: 2021-08-05 | End: 2021-08-14 | Stop reason: HOSPADM

## 2021-08-05 RX ORDER — FENTANYL CITRATE 50 UG/ML
50 INJECTION, SOLUTION INTRAMUSCULAR; INTRAVENOUS
Status: DISCONTINUED | OUTPATIENT
Start: 2021-08-05 | End: 2021-08-07

## 2021-08-05 RX ORDER — ACETAMINOPHEN 325 MG/1
650 TABLET ORAL EVERY 4 HOURS PRN
Status: DISCONTINUED | OUTPATIENT
Start: 2021-08-05 | End: 2021-08-07

## 2021-08-05 RX ORDER — ACETAMINOPHEN 650 MG/1
650 SUPPOSITORY RECTAL EVERY 4 HOURS PRN
Status: DISCONTINUED | OUTPATIENT
Start: 2021-08-05 | End: 2021-08-07

## 2021-08-05 RX ADMIN — PHYTONADIONE 10 MG: 10 INJECTION, EMULSION INTRAMUSCULAR; INTRAVENOUS; SUBCUTANEOUS at 22:28

## 2021-08-05 RX ADMIN — FENTANYL CITRATE 50 MCG: 50 INJECTION, SOLUTION INTRAMUSCULAR; INTRAVENOUS at 22:50

## 2021-08-05 RX ADMIN — PROTHROMBIN, COAGULATION FACTOR VII HUMAN, COAGULATION FACTOR IX HUMAN, COAGULATION FACTOR X HUMAN, PROTEIN C, PROTEIN S HUMAN, AND WATER 2280 UNITS: KIT at 22:28

## 2021-08-06 ENCOUNTER — APPOINTMENT (OUTPATIENT)
Dept: CT IMAGING | Facility: HOSPITAL | Age: 86
End: 2021-08-06

## 2021-08-06 LAB
DEPRECATED RDW RBC AUTO: 46.6 FL (ref 37–54)
ERYTHROCYTE [DISTWIDTH] IN BLOOD BY AUTOMATED COUNT: 13.3 % (ref 12.3–15.4)
GLUCOSE BLDC GLUCOMTR-MCNC: 100 MG/DL (ref 70–130)
GLUCOSE BLDC GLUCOMTR-MCNC: 116 MG/DL (ref 70–130)
GLUCOSE BLDC GLUCOMTR-MCNC: 119 MG/DL (ref 70–130)
GLUCOSE BLDC GLUCOMTR-MCNC: 67 MG/DL (ref 70–130)
HCT VFR BLD AUTO: 35.9 % (ref 34–46.6)
HGB BLD-MCNC: 11.8 G/DL (ref 12–15.9)
INR PPP: 1.32 (ref 0.9–1.1)
MCH RBC QN AUTO: 31.7 PG (ref 26.6–33)
MCHC RBC AUTO-ENTMCNC: 32.9 G/DL (ref 31.5–35.7)
MCV RBC AUTO: 96.5 FL (ref 79–97)
PLATELET # BLD AUTO: 190 10*3/MM3 (ref 140–450)
PMV BLD AUTO: 9.4 FL (ref 6–12)
PROTHROMBIN TIME: 16.2 SECONDS (ref 11.7–14.2)
QT INTERVAL: 447 MS
RBC # BLD AUTO: 3.72 10*6/MM3 (ref 3.77–5.28)
SARS-COV-2 ORF1AB RESP QL NAA+PROBE: NOT DETECTED
WBC # BLD AUTO: 3.52 10*3/MM3 (ref 3.4–10.8)

## 2021-08-06 PROCEDURE — 85027 COMPLETE CBC AUTOMATED: CPT | Performed by: INTERNAL MEDICINE

## 2021-08-06 PROCEDURE — 92610 EVALUATE SWALLOWING FUNCTION: CPT

## 2021-08-06 PROCEDURE — 99222 1ST HOSP IP/OBS MODERATE 55: CPT | Performed by: NURSE PRACTITIONER

## 2021-08-06 PROCEDURE — 85610 PROTHROMBIN TIME: CPT | Performed by: INTERNAL MEDICINE

## 2021-08-06 PROCEDURE — 70450 CT HEAD/BRAIN W/O DYE: CPT

## 2021-08-06 PROCEDURE — 82962 GLUCOSE BLOOD TEST: CPT

## 2021-08-06 PROCEDURE — 97530 THERAPEUTIC ACTIVITIES: CPT

## 2021-08-06 PROCEDURE — 25010000002 LEVETIRACETAM IN NACL 0.82% 500 MG/100ML SOLUTION: Performed by: NEUROLOGICAL SURGERY

## 2021-08-06 PROCEDURE — 97162 PT EVAL MOD COMPLEX 30 MIN: CPT

## 2021-08-06 PROCEDURE — 25010000002 FENTANYL CITRATE (PF) 50 MCG/ML SOLUTION: Performed by: INTERNAL MEDICINE

## 2021-08-06 RX ORDER — LEVETIRACETAM 5 MG/ML
500 INJECTION INTRAVASCULAR EVERY 12 HOURS SCHEDULED
Status: DISCONTINUED | OUTPATIENT
Start: 2021-08-06 | End: 2021-08-09

## 2021-08-06 RX ORDER — NICOTINE POLACRILEX 4 MG
15 LOZENGE BUCCAL
Status: DISCONTINUED | OUTPATIENT
Start: 2021-08-06 | End: 2021-08-14 | Stop reason: HOSPADM

## 2021-08-06 RX ORDER — LEVETIRACETAM 5 MG/ML
500 INJECTION INTRAVASCULAR EVERY 12 HOURS SCHEDULED
Status: DISCONTINUED | OUTPATIENT
Start: 2021-08-06 | End: 2021-08-06

## 2021-08-06 RX ORDER — DEXTROSE MONOHYDRATE 25 G/50ML
25 INJECTION, SOLUTION INTRAVENOUS
Status: DISCONTINUED | OUTPATIENT
Start: 2021-08-06 | End: 2021-08-14 | Stop reason: HOSPADM

## 2021-08-06 RX ADMIN — LEVETIRACETAM 500 MG: 5 INJECTION INTRAVENOUS at 16:16

## 2021-08-06 RX ADMIN — SODIUM CHLORIDE, PRESERVATIVE FREE 10 ML: 5 INJECTION INTRAVENOUS at 20:24

## 2021-08-06 RX ADMIN — ACETAMINOPHEN 650 MG: 325 TABLET, FILM COATED ORAL at 12:49

## 2021-08-06 RX ADMIN — LEVETIRACETAM 500 MG: 5 INJECTION INTRAVENOUS at 02:34

## 2021-08-06 RX ADMIN — SODIUM CHLORIDE, PRESERVATIVE FREE 10 ML: 5 INJECTION INTRAVENOUS at 08:03

## 2021-08-06 RX ADMIN — FENTANYL CITRATE 50 MCG: 50 INJECTION, SOLUTION INTRAMUSCULAR; INTRAVENOUS at 00:45

## 2021-08-06 NOTE — ED NOTES
Patient was placed in face mask in first look. Patient was wearing facemask when I entered the room and throughout our encounter. I wore full protective equipment throughout this patient encounter including a face mask, and gloves. Hand hygiene was performed before donning protective equipment and after removal when leaving the room.     Amina Parisi RN  08/05/21 2715

## 2021-08-06 NOTE — ED PROVIDER NOTES
EMERGENCY DEPARTMENT ENCOUNTER    Room Number:  24/24  PCP: Mega Esparza MD  Historian: Patient  History Limited By: Nothing      HPI  Chief Complaint: Headache and slurred speech  Context: Brittany Villeda is a 86 y.o. female who presents to the ED c/o headache and slurred speech.  Patient states he has had intermittent headache for the last few days.  States she has had weakness in her legs for weeks.  Patient states her family has been talking to her on the phone.  Last normal was 7 PM.  Patient now with slurred speech.  Patient is on Coumadin.  Patient's last INR was 7/26 and it was 2.1.  Patient states symptoms have improved.  States they have noted left facial droop.      Location: Slurred speech  Radiation: None  Character: Slurred  Duration: Last normal 7 PM  Severity: Moderate  Progression: Improved  Aggravating Factors: Nothing  Alleviating Factors: Nothing        MEDICAL RECORD REVIEW    Patient does have contrast allergy.  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   • Leukopenia 09/12/2016   • MGUS (monoclonal gammopathy of unknown significance) 09/16/2016   • 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   • Other chest pain 12/24/2017   • Lichen sclerosus 08/23/2017   • Heart block 10/30/2018   • Sleep-related hypoxia 12/22/2018   • Bradycardia 12/29/2018   • Shoulder arthritis 01/28/2019   • History of atrial fibrillation  03/19/2019   • Pacemaker 03/19/2019   • Paroxysmal SVT (supraventricular tachycardia) (CMS/HCC) 05/08/2019   • History of chest pain 05/08/2019   • Palpitations 05/08/2019   • Atrial fibrillation (CMS/HCC) 10/06/2019   • Rectal bleeding 10/15/2019   • Arthritis 12/13/2019   • Ascending aortic aneurysm (CMS/HCC) 08/24/2020   • Mediastinal lymphadenopathy 09/09/2020   • Immobility 11/20/2020   • Left knee pain 11/20/2020   • Chronic anticoagulation 11/20/2020   • Hemarthrosis of left knee 11/20/2020   • Anemia    • Obesity (BMI 30-39.9)    • Generalized weakness 04/08/2021   • Dysautonomia (CMS/HCC) 04/09/2021   • Weakness of both lower extremities 04/09/2021   • Macrocytosis 04/11/2021   • Hypercalcemia 04/11/2021   • Arthritis of right wrist 04/13/2021   • Hyperparathyroidism (CMS/HCC) 04/28/2021   • Choledocholithiasis 05/09/2021   • Essential hypertension 05/10/2021   • Hyperglycemia 05/10/2021   • Epigastric pain 05/12/2021   • Duodenal ulcer 05/13/2021   • Hemorrhagic gastritis 05/13/2021   • Exertional dyspnea 06/22/2021   • COPD (chronic obstructive pulmonary disease) (CMS/HCC)    • Functional quadriplegia (CMS/HCC) 11/20/2020   • Hip pain 04/12/2018   • Osteoarthritis of glenohumeral joint 07/12/2018   • Other malaise and fatigue 05/25/2021   • Shoulder pain 04/12/2018     Resolved Ambulatory Problems     Diagnosis Date Noted   • Abnormal weight loss 05/12/2016   • Tachycardia 05/12/2016   • Nausea and vomiting 05/12/2016   • Hyperthyroidism 05/12/2016   • Fatigue 05/12/2016   • Dizziness 05/12/2016   • Diarrhea 05/12/2016   • Abnormal thyroid stimulating hormone (TSH) level 05/12/2016   • Abdominal pain 05/12/2016   • Abnormal EKG 06/30/2016   • Acute UTI (urinary tract infection) 04/08/2021   • Spondylosis of lumbosacral region without myelopathy or radiculopathy 04/09/2021   • Hypomagnesemia 04/13/2021     Past Medical History:   Diagnosis Date   • Arrhythmia    • Asthma    • Chest pain    • Colitis    •  GERD (gastroesophageal reflux disease)    • Hypertension    • Hypertensive heart disease    • Kidney stone    • Low back pain    • Nephrolithiasis    • Obesity    • Paroxysmal atrial fibrillation (CMS/HCC)    • Peptic ulceration    • Persistent atrial fibrillation (CMS/HCC)    • PSVT (paroxysmal supraventricular tachycardia) (CMS/HCC)    • Rectal bleed    • Sleep apnea    • Systemic hypertension    • Ventricular tachycardia (CMS/HCC)    • Vertigo          PAST SURGICAL HISTORY  Past Surgical History:   Procedure Laterality Date   • BACK SURGERY      lumbar fusion   • CARDIAC ELECTROPHYSIOLOGY PROCEDURE N/A 11/7/2018    Procedure: Pacemaker DC new   [place];  Surgeon: Jesus Granda MD;  Location: [facility] CATH INVASIVE LOCATION;  Service: Cardiology   • CHOLECYSTECTOMY     • COLONOSCOPY  06/16/2014    colitis, cryptitis,  tics, NBIH, TA w/low grade dysplasia   • COLONOSCOPY N/A 10/28/2019    Procedure: COLONOSCOPY WITH COLD AND HOT POLYPECTOMIES;  Surgeon: Micaela English MD;  Location: [facility] ENDOSCOPY;  Service: Gastroenterology   • ENDOSCOPY N/A 5/13/2021    Procedure: ESOPHAGOGASTRODUODENOSCOPY with BX;  Surgeon: Stefan Mcfadden MD;  Location: [facility] ENDOSCOPY;  Service: Gastroenterology;  Laterality: N/A;  EPIGASTRIC PAIN  --DUODENAL ULCER, HEMORRHAGIC GASTRITIS, HIATAL HERNIA    • HEMORRHOIDECTOMY     • HYSTERECTOMY     • JOINT REPLACEMENT     • KNEE ARTHROPLASTY     • MYOMECTOMY     • PACEMAKER IMPLANTATION     • SHOULDER SURGERY     • SINUS SURGERY     • TOE NAIL AMPUTATION  03/04/2019   • TONSILLECTOMY           FAMILY HISTORY  Family History   Problem Relation Age of Onset   • Diabetes Mother    • Breast cancer Sister    • Kidney cancer Sister    • Heart disease Sister    • Prostate cancer Brother    • Prostate cancer Brother    • Prostate cancer Brother    • Malig Hyperthermia Neg Hx          SOCIAL HISTORY  Social History     Socioeconomic History   • Marital status:      Spouse name:  Not on file   • Number of children: 10   • Years of education: High School   • Highest education level: Not on file   Tobacco Use   • Smoking status: Former Smoker     Packs/day: 1.50     Years: 10.00     Pack years: 15.00     Start date:      Quit date:      Years since quittin.6   • Smokeless tobacco: Never Used   • Tobacco comment: QUIT SMOKING    Vaping Use   • Vaping Use: Never used   Substance and Sexual Activity   • Alcohol use: No   • Drug use: Defer   • Sexual activity: Defer         ALLERGIES  Codeine, Amitriptyline, Amoxicillin-pot clavulanate, Aspirin, Bactrim [sulfamethoxazole-trimethoprim], Carisoprodol-aspirin-codeine, Iodinated diagnostic agents, Latex, Naproxen, Nsaids, Soma compound with codeine [carisoprodol-aspirin-codeine], Sulfa antibiotics, and Tramadol        REVIEW OF SYSTEMS  Review of Systems   Constitutional: Negative for fever.   HENT: Negative for sore throat.    Eyes: Negative.    Respiratory: Negative for cough and shortness of breath.    Cardiovascular: Negative for chest pain.   Gastrointestinal: Negative for abdominal pain, diarrhea and vomiting.   Genitourinary: Negative for dysuria.   Musculoskeletal: Negative for neck pain.   Skin: Negative for rash.   Allergic/Immunologic: Negative.    Neurological: Positive for speech difficulty, weakness and headaches. Negative for numbness.   Hematological: Negative.    Psychiatric/Behavioral: Negative.    All other systems reviewed and are negative.           PHYSICAL EXAM  ED Triage Vitals [21]   Temp Heart Rate Resp BP SpO2   97.8 °F (36.6 °C) 70 14 130/66 97 %      Temp src Heart Rate Source Patient Position BP Location FiO2 (%)   Tympanic Monitor -- -- --       Physical Exam  Vitals and nursing note reviewed.   Constitutional:       General: She is not in acute distress.  HENT:      Head: Normocephalic and atraumatic.   Eyes:      Pupils: Pupils are equal, round, and reactive to light.   Cardiovascular:       Rate and Rhythm: Normal rate and regular rhythm.      Heart sounds: Normal heart sounds.   Pulmonary:      Effort: Pulmonary effort is normal. No respiratory distress.      Breath sounds: Normal breath sounds.   Abdominal:      Palpations: Abdomen is soft.      Tenderness: There is no abdominal tenderness. There is no guarding or rebound.   Musculoskeletal:         General: Normal range of motion.      Cervical back: Normal range of motion and neck supple.   Skin:     General: Skin is warm and dry.      Findings: No rash.   Neurological:      Mental Status: She is alert and oriented to person, place, and time.      Sensory: Sensation is intact. No sensory deficit.      Motor: Weakness present.      Comments: Patient with bilateral lower extremity weakness   Psychiatric:         Mood and Affect: Mood and affect normal.       NIH 3 Bilateral leg weakness is not new    Patient was wearing a face mask when I entered the room and they continued to wear a mask throughout their stay in the ED.  I wore PPE, including  gloves, face mask with shield or face mask with goggles whenever I was in the room with patient.      LAB RESULTS  Recent Results (from the past 24 hour(s))   Comprehensive Metabolic Panel    Collection Time: 08/05/21  8:39 PM    Specimen: Blood   Result Value Ref Range    Glucose 150 (H) 65 - 99 mg/dL    BUN 7 (L) 8 - 23 mg/dL    Creatinine 0.60 0.57 - 1.00 mg/dL    Sodium 136 136 - 145 mmol/L    Potassium 3.9 3.5 - 5.2 mmol/L    Chloride 102 98 - 107 mmol/L    CO2 25.3 22.0 - 29.0 mmol/L    Calcium 10.3 8.6 - 10.5 mg/dL    Total Protein 7.3 6.0 - 8.5 g/dL    Albumin 3.70 3.50 - 5.20 g/dL    ALT (SGPT) 6 1 - 33 U/L    AST (SGOT) 20 1 - 32 U/L    Alkaline Phosphatase 75 39 - 117 U/L    Total Bilirubin 0.7 0.0 - 1.2 mg/dL    eGFR  African Amer 115 >60 mL/min/1.73    Globulin 3.6 gm/dL    A/G Ratio 1.0 g/dL    BUN/Creatinine Ratio 11.7 7.0 - 25.0    Anion Gap 8.7 5.0 - 15.0 mmol/L   Troponin    Collection Time:  08/05/21  8:39 PM    Specimen: Blood   Result Value Ref Range    Troponin T <0.010 0.000 - 0.030 ng/mL   CBC Auto Differential    Collection Time: 08/05/21  8:39 PM    Specimen: Blood   Result Value Ref Range    WBC 3.84 3.40 - 10.80 10*3/mm3    RBC 4.03 3.77 - 5.28 10*6/mm3    Hemoglobin 12.5 12.0 - 15.9 g/dL    Hematocrit 39.0 34.0 - 46.6 %    MCV 96.8 79.0 - 97.0 fL    MCH 31.0 26.6 - 33.0 pg    MCHC 32.1 31.5 - 35.7 g/dL    RDW 13.1 12.3 - 15.4 %    RDW-SD 46.4 37.0 - 54.0 fl    MPV 8.9 6.0 - 12.0 fL    Platelets 216 140 - 450 10*3/mm3    Neutrophil % 50.4 42.7 - 76.0 %    Lymphocyte % 41.4 19.6 - 45.3 %    Monocyte % 6.8 5.0 - 12.0 %    Eosinophil % 0.8 0.3 - 6.2 %    Basophil % 0.3 0.0 - 1.5 %    Neutrophils, Absolute 1.94 1.70 - 7.00 10*3/mm3    Lymphocytes, Absolute 1.59 0.70 - 3.10 10*3/mm3    Monocytes, Absolute 0.26 0.10 - 0.90 10*3/mm3    Eosinophils, Absolute 0.03 0.00 - 0.40 10*3/mm3    Basophils, Absolute 0.01 0.00 - 0.20 10*3/mm3   Protime-INR    Collection Time: 08/05/21  8:40 PM    Specimen: Blood   Result Value Ref Range    Protime 29.1 (H) 11.7 - 14.2 Seconds    INR 2.78 (H) 0.90 - 1.10   ECG 12 Lead    Collection Time: 08/05/21  8:47 PM   Result Value Ref Range    QT Interval 447 ms   Protime-INR    Collection Time: 08/05/21 10:03 PM    Specimen: Blood   Result Value Ref Range    Protime 28.0 (H) 11.7 - 14.2 Seconds    INR 2.65 (H) 0.90 - 1.10       Ordered the above labs and reviewed the results.        RADIOLOGY  CT Head Without Contrast   Final Result         Electronically signed by Rusty Martínez MD on 08-05-21 at 2114           Ordered the above noted radiological studies. Reviewed by me in PACS.         PROCEDURES  Critical Care  Performed by: Mark Burnett MD  Authorized by: Bella Vasquez MD     Critical care provider statement:     Critical care time (minutes):  45    Critical care time was exclusive of:  Separately billable procedures and treating other patients    Critical  care was necessary to treat or prevent imminent or life-threatening deterioration of the following conditions:  CNS failure or compromise    Critical care was time spent personally by me on the following activities:  Discussions with primary provider, discussions with consultants, ordering and performing treatments and interventions, ordering and review of laboratory studies and ordering and review of radiographic studies          EKG:          EKG time: 2047  Rhythm/Rate: Paced rhythm rate 70    Interpreted Contemporaneously by me, independently viewed  Unchanged compared to prior 6/22/2021        MEDICATIONS GIVEN IN ER  Medications   prothrombin complex conc human (KCentra) IV solution 2,280 Units (2,280 Units Intravenous Incomplete 8/5/21 2211)     And   phytonadione (AQUA-MEPHYTON, VITAMIN K) 10 mg in sodium chloride 0.9 % 50 mL IVPB (has no administration in time range)   sodium chloride 0.9 % flush 10 mL (has no administration in time range)   sodium chloride 0.9 % flush 10 mL (has no administration in time range)   acetaminophen (TYLENOL) tablet 650 mg (has no administration in time range)     Or   acetaminophen (TYLENOL) suppository 650 mg (has no administration in time range)   fentaNYL citrate (PF) (SUBLIMAZE) injection 50 mcg (has no administration in time range)   niCARdipine (CARDENE) 25 mg in 250 mL NS infusion kit (has no administration in time range)   ondansetron (ZOFRAN) injection 4 mg (has no administration in time range)   bisacodyl (DULCOLAX) suppository 10 mg (has no administration in time range)   aluminum-magnesium hydroxide-simethicone (MAALOX MAX) 400-400-40 MG/5ML suspension 7.5 mL (has no administration in time range)             PROGRESS AND CONSULTS  ED Course as of Aug 05 2225   Thu Aug 05, 2021   2044 20:44 EDT  Patient discussed with Dr. Wilkins on arrival.  Patient's deficit is small.  Patient speech sounds like it is improved.  Patient does have history of contrast allergy and is  on Coumadin last evaluated was normal.  Deficit too small for mechanical intervention.  tPA not indicated as NIH is low and patient on Coumadin.    [SL]   2150 21:50 EDT  Patient presents for headache and slurred speech.  Peers to have subacute subdural with possible subarachnoid hemorrhage.  Patient is on Coumadin has been given Kcentra.  Patient has been discussed with Dr. Louie and will be admitted. Discussed with Dr. Callaway who will see her in the ICU.    [SL]      ED Course User Index  [SL] Mark Burnett MD     Burton and Norris Grade:  The Burton and Norris grade for this patient is: 2 at 22:25 EDT on 08/05/21.      MEDICAL DECISION MAKING      MDM  Number of Diagnoses or Management Options     Amount and/or Complexity of Data Reviewed  Clinical lab tests: reviewed and ordered (INR of 2.7)  Tests in the radiology section of CPT®: reviewed and ordered (CT with subacute subdural)  Discuss the patient with other providers: yes (Discussed with Dr. Louie and Dr. Callaway and will be admitted to ICU)               DIAGNOSIS  Final diagnoses:   Subdural hematoma (CMS/HCC)   SAH (subarachnoid hemorrhage) (CMS/HCC)           DISPOSITION  admit        Latest Documented Vital Signs:  As of 22:25 EDT  BP- 130/66 HR- 70 Temp- 97.8 °F (36.6 °C) (Tympanic) O2 sat- 98%                         Mark Burnett MD  08/05/21 0416

## 2021-08-06 NOTE — PLAN OF CARE
Goal Outcome Evaluation:  Plan of Care Reviewed With: patient           Outcome Summary: Pt is a 85 yo F who was admitted with slurred speech and found to have SDH and SAH. Pt presents to PT with impaired functional mobility and gait secondary to generalized weakness, impaired balance, and decreased activity tolerance. Upon standing with PT this AM, RN asked for patient to be returned to bed as she experienced a run of V-tach. PT will continue to follow to address strength, mobility, and gait.Patient was intermittently wearing a face mask during this therapy encounter. Therapist used appropriate personal protective equipment including eye protection, mask, and gloves.  Mask used was standard procedure mask. Appropriate PPE was worn during the entire therapy session. Hand hygiene was completed before and after therapy session. Patient is not in enhanced droplet precautions.

## 2021-08-06 NOTE — CONSULTS
"Adult Nutrition  Assessment/PES    Patient Name:  Brittany Villeda  YOB: 1935  MRN: [id number]  Admit Date:  8/5/2021    Assessment Date:  8/6/2021    Comments:  Nutrition screen completed per RN admission screen. Pt admitted with c/o headache and slurred speech and left facial droop due to SDH/SAH. Pt NPO at this time. Will follow for start of po as medical conditions allow.     Reason for Assessment     Row Name 08/06/21 0700          Reason for Assessment    Reason For Assessment  identified at risk by screening criteria     Diagnosis  -- SDH, Hx of HTN, Reflux, duodenal ulcer, gastritis     Identified At Risk by Screening Criteria  MST SCORE 2+         Nutrition/Diet History     Row Name 08/06/21 0701          Nutrition/Diet History    Typical Food/Fluid Intake  NPO,         Anthropometrics     Row Name 08/06/21 0701 08/06/21 0000       Anthropometrics    Height  162.6 cm (64.02\")  --    Weight  91.7 kg (202 lb 2.6 oz) not weighed by RD  91.7 kg (202 lb 2.6 oz)       Ideal Body Weight (IBW)    Ideal Body Weight (IBW) (kg)  55.04  --    % Ideal Body Weight  166.61  --       Body Mass Index (BMI)    BMI (kg/m2)  34.76  --    BMI Assessment  BMI 30-34.9: obesity grade I  --        Labs/Tests/Procedures/Meds     Row Name 08/06/21 0701          Labs/Procedures/Meds    Lab Results Reviewed  reviewed        Diagnostic Tests/Procedures    Diagnostic Test/Procedure Reviewed  reviewed        Medications    Pertinent Medications Reviewed  reviewed     Pertinent Medications Comments  keppra, cardene,         Physical Findings     Row Name 08/06/21 0702          Physical Findings    Overall Physical Appearance  obese         Estimated/Assessed Needs     Row Name 08/06/21 0701          Calculation Measurements    Height  162.6 cm (64.02\")         Nutrition Prescription Ordered     Row Name 08/06/21 0702          Nutrition Prescription PO    Current PO Diet  NPO                 Problem/Interventions:  Problem " 1     Row Name 08/06/21 0703          Nutrition Diagnoses Problem 1    Problem 1  Predicted Suboptimal Intake     Etiology (related to)  Medical Diagnosis               Intervention Goal     Row Name 08/06/21 0703          Intervention Goal    General  Maintain nutrition;Disease management/therapy;Reduce/improve symptoms     PO  Initiate feeding;PO intake (%);Tolerate PO     PO Intake %  75 %     Weight  No significant weight loss         Nutrition Intervention     Row Name 08/06/21 0704          Nutrition Intervention    RD/Tech Action  Follow Tx progress;Care plan reviewd;Await begin PO           Education/Evaluation     Row Name 08/06/21 0704          Education    Education  Will Instruct as appropriate        Monitor/Evaluation    Monitor  Per protocol           Electronically signed by:  Lucy Connell RD  08/06/21 07:04 EDT

## 2021-08-06 NOTE — CONSULTS
[facility] NEUROSURGERY CONSULT NOTE    Patient name: Brittany Villeda  Referring Provider: Dr. Louie  Reason for Consultation: subdural hematoma    Patient Care Team:  Mega Esparza MD as PCP - General (Family Medicine)  Micaela English MD as Consulting Physician (Gastroenterology)  Mary Grace Culp MD as Consulting Physician (Cardiology)  Jp Krasue Jr., MD as Consulting Physician (Hematology and Oncology)  Yasmeen Pichardo [facility] as Pharmacist  Micaela English MD as Referring Physician (Gastroenterology)  Devon Valadez MD as Consulting Physician (Hematology and Oncology)  Rusty Interiano PharmD as Pharmacist (Pharmacy)    Chief complaint: Slurred speech    Subjective .     History of present illness:    Patient is a 86 y.o.  female with history of atrial fibrillation on chronic Coumadin.  Was hospitalized earlier this year with a GI bleed.  Despite this, her Coumadin was resumed.  She reports onset of headache approximately 2 to 3 weeks ago.  It is typically only present when she coughs.  She describes it as a throbbing pain.  Takes Tylenol with minimal relief.  She denies any trauma to her head or falls.  She was brought to the ER yesterday as her family also noticed some change in her speech with slurring.  Currently she states it still seems a bit slow.  No numbness tingling of her extremities.  No neck pain.  No visual changes.  She reports weakness in her legs that is been present for many months following multiple medical issues over the last year.  She states she has been working with home health therapy.  She ambulates with a walker.  Reports difficulty getting to the bathroom in time due to mobility issues but knows when she needs to urinate and move her bowels    Review of Systems  Review of Systems   Constitutional: Negative for fever.   HENT: Negative for trouble swallowing.    Eyes: Negative for visual disturbance.   Respiratory: Negative for shortness of breath.    Cardiovascular: Negative for  chest pain.   Gastrointestinal: Negative for nausea and vomiting.   Genitourinary: Positive for enuresis.   Musculoskeletal: Negative for back pain.   Neurological: Positive for speech difficulty, weakness and headaches. Negative for dizziness and numbness.   Psychiatric/Behavioral: Negative for confusion.       History  PAST MEDICAL HISTORY  Past Medical History:   Diagnosis Date   • Acute UTI (urinary tract infection) 4/8/2021   • Anemia    • Arrhythmia    • Arthritis    • Asthma    • Bradycardia    • Chest pain    • Choledocholithiasis 5/9/2021   • Colitis    • COPD (chronic obstructive pulmonary disease) (CMS/HCC)    • Diastolic dysfunction    • Essential hypertension 5/12/2016   • GERD (gastroesophageal reflux disease)    • Heart block    • Hypertension    • Hypertensive heart disease    • Hyperthyroidism    • Kidney stone    • Leukopenia    • Low back pain    • MGUS (monoclonal gammopathy of unknown significance)    • Nephrolithiasis    • Obesity    • Paroxysmal atrial fibrillation (CMS/HCC)    • Peptic ulceration    • Persistent atrial fibrillation (CMS/HCC)    • PSVT (paroxysmal supraventricular tachycardia) (CMS/HCC)    • Pulmonary hypertension (CMS/HCC)    • Rectal bleed    • Sleep apnea    • Systemic hypertension    • Trifascicular block    • 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)    • Ventricular tachycardia (CMS/HCC)     nonsustained   • Vertigo        PAST SURGICAL HISTORY  Past Surgical History:   Procedure Laterality Date   • BACK SURGERY      lumbar fusion   • CARDIAC ELECTROPHYSIOLOGY PROCEDURE N/A 11/7/2018    Procedure: Pacemaker DC new   [place];  Surgeon: Jesus Granda MD;  Location: [facility] INVASIVE LOCATION;  Service: Cardiology   • CHOLECYSTECTOMY     • COLONOSCOPY  06/16/2014    colitis, cryptitis,  tics, NBIH, TA w/low grade dysplasia   • COLONOSCOPY N/A 10/28/2019    Procedure: COLONOSCOPY WITH COLD AND HOT POLYPECTOMIES;  Surgeon: Micaela English MD;   Location:  [facility];  Service: Gastroenterology   • ENDOSCOPY N/A 2021    Procedure: ESOPHAGOGASTRODUODENOSCOPY with BX;  Surgeon: Stefan Mcfadden MD;  Location:  [facility];  Service: Gastroenterology;  Laterality: N/A;  EPIGASTRIC PAIN  --DUODENAL ULCER, HEMORRHAGIC GASTRITIS, HIATAL HERNIA    • HEMORRHOIDECTOMY     • HYSTERECTOMY     • JOINT REPLACEMENT     • KNEE ARTHROPLASTY     • MYOMECTOMY     • PACEMAKER IMPLANTATION     • SHOULDER SURGERY     • SINUS SURGERY     • TOE NAIL AMPUTATION  2019   • TONSILLECTOMY         FAMILY HISTORY  Family History   Problem Relation Age of Onset   • Diabetes Mother    • Breast cancer Sister    • Kidney cancer Sister    • Heart disease Sister    • Prostate cancer Brother    • Prostate cancer Brother    • Prostate cancer Brother    • Malig Hyperthermia Neg Hx        SOCIAL HISTORY  Social History     Tobacco Use   • Smoking status: Former Smoker     Packs/day: 1.50     Years: 10.00     Pack years: 15.00     Start date:      Quit date:      Years since quittin.6   • Smokeless tobacco: Never Used   • Tobacco comment: QUIT SMOKING    Vaping Use   • Vaping Use: Never used   Substance Use Topics   • Alcohol use: No   • Drug use: Defer       retired  Lives with her son    Allergies:  Codeine, Amitriptyline, Amoxicillin-pot clavulanate, Aspirin, Bactrim [sulfamethoxazole-trimethoprim], Carisoprodol-aspirin-codeine, Iodinated diagnostic agents, Latex, Naproxen, Nsaids, Soma compound with codeine [carisoprodol-aspirin-codeine], Sulfa antibiotics, and Tramadol    MEDICATIONS:  Medications Prior to Admission   Medication Sig Dispense Refill Last Dose   • acetaminophen (TYLENOL) 500 MG tablet Take 1 tablet by mouth Every 6 (Six) Hours As Needed for Mild Pain .   Past Week at Unknown time   • docusate sodium (COLACE) 100 MG capsule Take 100 mg by mouth Every Night.   Past Week at Unknown time   • furosemide (LASIX) 20 MG tablet Take 1  tablet by mouth Daily. 90 tablet 1 Past Week at Unknown time   • magnesium oxide (MAG-OX) 400 MG tablet Take 1 tablet by mouth Daily. 30 tablet 0 8/5/2021 at Unknown time   • mesalamine (APRISO) 0.375 g 24 hr capsule TAKE 4 CAPSULES DAILY 360 capsule 0 Past Week at Unknown time   • pantoprazole (Protonix) 40 MG EC tablet Take 1 tablet by mouth 2 (Two) Times a Day. 90 tablet 1 Past Week at Unknown time   • vitamin B-12 (CYANOCOBALAMIN) 1000 MCG tablet Take 1,000 mcg by mouth Daily.   Past Week at Unknown time   • warfarin (COUMADIN) 2.5 MG tablet Take 2.5 mg by mouth Every Night. 5mg on Monday and Thursday   Past Week at Unknown time       Current Facility-Administered Medications:   •  acetaminophen (TYLENOL) tablet 650 mg, 650 mg, Oral, Q4H PRN, 650 mg at 08/06/21 1249 **OR** acetaminophen (TYLENOL) suppository 650 mg, 650 mg, Rectal, Q4H PRN, Brian Louie MD  •  aluminum-magnesium hydroxide-simethicone (MAALOX MAX) 400-400-40 MG/5ML suspension 7.5 mL, 7.5 mL, Oral, Q4H PRN, Brian Louie MD  •  bisacodyl (DULCOLAX) suppository 10 mg, 10 mg, Rectal, Daily PRN, Brian Louie MD  •  dextrose (D50W) 25 g/ 50mL Intravenous Solution 25 g, 25 g, Intravenous, Q15 Min PRN, Bella Vasquez MD  •  dextrose (GLUTOSE) oral gel 15 g, 15 g, Oral, Q15 Min PRN, Bella Vasquez MD  •  fentaNYL citrate (PF) (SUBLIMAZE) injection 50 mcg, 50 mcg, Intravenous, Q1H PRN, Brian Louie MD, 50 mcg at 08/06/21 0045  •  glucagon (human recombinant) (GLUCAGEN DIAGNOSTIC) injection 1 mg, 1 mg, Subcutaneous, Q15 Min PRN, Bella Vasquez MD  •  levETIRAcetam in NaCl 0.82% (KEPPRA) IVPB 500 mg, 500 mg, Intravenous, Q12H, Todnem, Gustavo, MD, 500 mg at 08/06/21 0234  •  niCARdipine (CARDENE) 25 mg in 250 mL NS infusion kit, 5-15 mg/hr, Intravenous, Titrated, Marianela Ramos, APRN  •  ondansetron (ZOFRAN) injection 4 mg, 4 mg, Intravenous, Q6H PRN, Brian Louie MD  •  sodium chloride 0.9 % flush 10 mL, 10 mL, Intravenous, Q12H,  Brian Louie MD, 10 mL at 08/06/21 0803  •  sodium chloride 0.9 % flush 10 mL, 10 mL, Intravenous, PRN, Brian Louie MD    Objective     Results Review:  LABS:  Results from last 7 days   Lab Units 08/06/21 0412 08/05/21 2039   WBC 10*3/mm3 3.52 3.84   HEMOGLOBIN g/dL 11.8* 12.5   HEMATOCRIT % 35.9 39.0   PLATELETS 10*3/mm3 190 216     Results from last 7 days   Lab Units 08/05/21 2039   SODIUM mmol/L 136   POTASSIUM mmol/L 3.9   CHLORIDE mmol/L 102   CO2 mmol/L 25.3   BUN mg/dL 7*   CREATININE mg/dL 0.60   CALCIUM mg/dL 10.3   BILIRUBIN mg/dL 0.7   ALK PHOS U/L 75   ALT (SGPT) U/L 6   AST (SGOT) U/L 20   GLUCOSE mg/dL 150*     Results from last 7 days   Lab Units 08/06/21 0412 08/05/21 2203 08/05/21  2040   INR  1.32* 2.65* 2.78*     Covid-negative    DIAGNOSTICS:  CT head reveals mixed density subacute posterior/with anterior chronic appearing bilateral left greater than right left convexity subdural hematomas. there is also subdural along the bilateral tentorium.  I do not appreciate subarachnoid hemorrhage on this study.  There is mass-effect particularly upon the sulci with some mild midline shift left to right.  On repeat imaging, findings are stable    Results Review:   I reviewed the patient's new clinical results.  I personally viewed and interpreted the patient's CT head.  Also reviewed by discussed with Dr. Callaway    Vital Signs   Temp:  [97.1 °F (36.2 °C)-98 °F (36.7 °C)] 97.5 °F (36.4 °C)  Heart Rate:  [69-70] 70  Resp:  [14-22] 18  BP: (105-133)/(62-89) 105/62    Physical Exam:  Physical Exam  Vitals reviewed.   Constitutional:       Comments: Her appearing as stated age female lying in bed in no acute distress   HENT:      Head: Normocephalic and atraumatic.   Eyes:      Extraocular Movements: EOM normal.      Pupils: Pupils are equal, round, and reactive to light.   Pulmonary:      Effort: Pulmonary effort is normal.   Musculoskeletal:      Cervical back: Neck supple.   Skin:      General: Skin is warm and dry.   Neurological:      General: No focal deficit present.      Mental Status: She is alert and oriented to person, place, and time.      Coordination: Finger-Nose-Finger Test normal.   Psychiatric:         Mood and Affect: Mood normal.         Speech: Speech is slurred.         Thought Content: Thought content normal.       Neurologic Exam     Mental Status   Oriented to person, place, and time.   Follows 3 step commands.   Attention: normal. Concentration: normal.   Speech: slurred (Minimal slurring)  Level of consciousness: alert  Knowledge: good.   Able to name object. Able to read. Able to repeat (Mild difficulty repeating phrases). Normal comprehension.     Cranial Nerves     CN II   Visual fields full to confrontation.     CN III, IV, VI   Pupils are equal, round, and reactive to light.  Extraocular motions are normal.   Right pupil: Size: 3 mm. Shape: regular. Reactivity: brisk.   Left pupil: Size: 3 mm. Shape: regular. Reactivity: brisk.   CN III: no CN III palsy  CN VI: no CN VI palsy  Nystagmus: none   Diplopia: none    CN V   Facial sensation intact.     CN VII   Facial expression full, symmetric.     CN VIII   CN VIII normal.     CN IX, X   CN IX normal.   CN X normal.     CN XI   CN XI normal.     CN XII   CN XII normal.     Motor Exam   Right arm pronator drift: absent  Left arm pronator drift: absent    Strength   Right deltoid: 4/5  Left deltoid: 5/5  Right biceps: 5/5  Left biceps: 5/5  Right triceps: 5/5  Left triceps: 5/5  Right interossei: 5/5  Left interossei: 5/5  Right iliopsoas: 3/5  Left iliopsoas: 3/5  Right quadriceps: 5/5  Left quadriceps: 5/5  Right hamstrin/5  Left hamstrin/5  Right anterior tibial: 5/5  Left anterior tibial: 5/5  Right gastroc: 5/5  Left gastroc: 5/5    Sensory Exam   Light touch normal.     Gait, Coordination, and Reflexes     Gait  Gait: (Per PT note -patient performed sit to stand x1 but placed back in bed due to run of V.  tach on monitor with patient reporting dizziness.)    Coordination   Finger to nose coordination: normal    Reflexes   Right plantar: normal  Left plantar: normal  Right Catalan: absent  Left Catalan: absent  Right ankle clonus: absent  Left pendular knee jerk: absent      Assessment/Plan       ICH (intracerebral hemorrhage) (CMS/HCC)    Subdural hematoma (CMS/HCC)    Patient presents to hospital with several weeks of intermittent headache intensified with coughing as well as some slurring of her speech and generalized weakness.  She denies any falls or trauma to her head.  She is on Coumadin chronically for atrial fibrillation.  CT showed bilateral left greater than right mixed density subacute to chronic subdural hematomas.  Repeat CT today stable.  INR was therapeutic upon arrival patient was given Kcentra and has now normalized.  On exam she has no cranial nerve deficits.  Very minimal slurring of speech which is more of a slowing than a true slurring.  She has generalized weakness particularly in her hip flexors.  We will continue to monitor closely.  Should remain in ICU for at least 24 hours from admission with another CT in the morning.  This is stable can likely transfer out.  Keep off Coumadin.  Keppra added for seizure prophylaxis.  Keep SBP less than 150.  I think the big question is whether the patient should ever be resumed on Coumadin at this point with recent GI bleed and a subdural.  May need to consider involving her cardiologist at some point. Dr. Callaway spoke with family this morning and he discussed dusty hole drainage and MMA.  She does not have any significant deficits on exam so bur hole drainage not necessary at this time but MMA would help prevent further hemorrhage in the future.  Per discussion with Dr. Callaway, the patient's family would like to think about this. They are not ready to make a decision as of yet.  She will very likely need patient rehab at discharge.      PLAN:   Keep in ICU  "today  CT head and INR in a.m.  Neuro check every 2 hours  Continue to hold Coumadin  Keep SBP less than 150  Keppra 500 mg twice daily-likely continue x1 week and discontinue if no seizure activity noted  Okay for diet and therapy eval    I discussed the patient's findings and my recommendations with patient, family, nursing staff and Dr. Cesia Ramos, APRN  08/06/21  15:10 EDT    \"Dictated utilizing Dragon dictation\".      "

## 2021-08-06 NOTE — PROGRESS NOTES
Discharge Planning Assessment  [facility]     Patient Name: Brittany Villeda  MRN: [id number]  Today's Date: 8/6/2021    Admit Date: 8/5/2021    Discharge Needs Assessment     Row Name 08/06/21 1459       Living Environment    Lives With  child(donaldo), adult    Current Living Arrangements  home/apartment/condo    Potentially Unsafe Housing Conditions  unable to assess    Primary Care Provided by  self    Provides Primary Care For  no one    Family Caregiver if Needed  child(donaldo), adult    Quality of Family Relationships  supportive    Able to Return to Prior Arrangements  yes       Resource/Environmental Concerns    Resource/Environmental Concerns  none    Transportation Concerns  car, none       Transition Planning    Patient/Family Anticipates Transition to  home with family    Patient/Family Anticipated Services at Transition  none    Transportation Anticipated  family or friend will provide       Discharge Needs Assessment    Equipment Currently Used at Home  walker, rolling    Concerns to be Addressed  discharge planning    Anticipated Changes Related to Illness  none    Equipment Needed After Discharge  none        Discharge Plan     Row Name 08/06/21 4342       Plan    Plan Comments  IMM noted.  CCP spoke with patient at bedside .  CCP role explained and discharge planning discussed. Face sheet verified.  Pt PCP is Dr. Mega Esparza.  Pt’s emergency contact is her daughter Amina, [phone].   Pt lives in a one story house with her son.  Her family assists her as needed.  She uses a walker to ambulate.  She Is independent with most ADL’s.  Pt has no history of Home Health. She reports she has Physical therapy in her home.   Pt has no past rehab stays.  She obtains her medications from [facility] pharmacy on [address].   Pt plan is Home.  Pt denies needs   She will continue with her PT.  CCP following therapies for DC needs    Row Name 08/06/21 3852       Plan    Plan  Plan is home        Continued Care and  Services - Admitted Since 8/5/2021    Coordination has not been started for this encounter.     Selected Continued Care - Prior Encounters Includes selections from prior encounters from 5/7/2021 to 8/6/2021    Discharged on 6/23/2021 Admission date: 6/22/2021 - Discharge disposition: [place]-Health Care [facility]    [place]     Service Provider Selected Services Address Phone Fax Patient Preferred    [facility] [address] [place] [phone] --                Discharged on 5/14/2021 Admission date: 5/9/2021 - Discharge disposition: Home or Self Care    Home Medical Care     Service Provider Selected Services Address Phone Fax Patient Preferred    [facility] [address] [place] [phone] --                      Demographic Summary    No documentation.       Functional Status    No documentation.       Psychosocial    No documentation.       Abuse/Neglect    No documentation.       Legal    No documentation.       Substance Abuse    No documentation.       Patient Forms    No documentation.           Yuliet Simon, CORRINE

## 2021-08-06 NOTE — PROGRESS NOTES
PROGRESS NOTE  Patient Name: Brittany Villeda  Age/Sex: 86 y.o. female  : 1935  MRN: [id number]    Date of Admission: 2021  Date of Encounter Visit: 21   LOS: 1 day   Patient Care Team:  Mega Esparza MD as PCP - General (Family Medicine)  Micaela English MD as Consulting Physician (Gastroenterology)  Mary Grace Culp MD as Consulting Physician (Cardiology)  Jp Krause Jr., MD as Consulting Physician (Hematology and Oncology)  Yasmeen Pichardo [facility] as Pharmacist  Micaela English MD as Referring Physician (Gastroenterology)  Devon Valadez MD as Consulting Physician (Hematology and Oncology)  Rusty Interiano, PharmD as Pharmacist (Pharmacy)    Chief Complaint:Subdural hematoma with possible diffuse subarachnoid hemorrhage, on home anticoagulation with Coumadin, came in with slurred speech and headache    Hospital course: Patient had therapeutic INR on presentation that was reversed with Kcentra with repeat INR of 1.32.  She is supposed to have repeat CT of the head this morning which will be ordered, she was evaluated by neurosurgery, no intervention is indicated at this point.  Patient denies any shortness of breath, she is on room air  Patient denies any palpitation she has a paced rhythm  Patient is afebrile, able to protect her airway with no suspected aspiration pneumonia or risk of such.  Her speech is doing better, she is slightly hard of hearing, she will by speech therapy today        REVIEW OF SYSTEMS:   CONSTITUTIONAL: no fever or chills  CARDIOVASCULAR: No chest pain, chest pressure or chest discomfort. No palpitations or edema.   RESPIRATORY: No shortness of breath, cough or sputum.   GASTROINTESTINAL: No anorexia, nausea, vomiting or diarrhea. No abdominal pain or blood.   HEMATOLOGIC: No bleeding or bruising.     Ventilator/Non-Invasive Ventilation Settings (From admission, onward)    None            Vital Signs  Temp:  [97.1 °F (36.2 °C)-98 °F (36.7 °C)] 97.1 °F (36.2  "°C)  Heart Rate:  [70] 70  Resp:  [14-22] 18  BP: (113-133)/(62-89) 122/64  SpO2:  [94 %-99 %] 99 %  on    Device (Oxygen Therapy): room air    Intake/Output Summary (Last 24 hours) at 8/6/2021 0920  Last data filed at 8/6/2021 0234  Gross per 24 hour   Intake 100 ml   Output 550 ml   Net -450 ml     Flowsheet Rows      First Filed Value   Admission Height  162.6 cm (64.02\") Documented at 08/06/2021 0701   Admission Weight  91.7 kg (202 lb 2.6 oz) Documented at 08/06/2021 0000        Body mass index is 34.68 kg/m².      08/06/21  0000 08/06/21  0701   Weight: 91.7 kg (202 lb 2.6 oz) 91.7 kg (202 lb 2.6 oz) (not weighed by RD)       Physical Exam:  GEN:  No acute distress, alert, cooperative, well developed   EYES:   Sclerae clear. No icterus. PERRL. Normal EOM  ENT:   External ears/nose normal, no oral lesions, no thrush, mucous membranes moist  NECK:  Supple, midline trachea, no JVD  LUNGS: Normal chest on inspection, CTAB, no wheezes. No rhonchi. No crackles. Respirations regular, even and unlabored.   CV:  Regular rhythm and rate patient has a paced rhythm based on the monitor. Normal S1/S2. No murmurs, gallops, or rubs noted.  ABD:  Soft, nontender and nondistended. Normal bowel sounds. No guarding  EXT:  Moves all extremities well. No cyanosis. No redness. No edema.   Skin: Dry, intact, no bleeding    Results Review:        Results from last 7 days   Lab Units 08/05/21 2039   SODIUM mmol/L 136   POTASSIUM mmol/L 3.9   CHLORIDE mmol/L 102   CO2 mmol/L 25.3   BUN mg/dL 7*   CREATININE mg/dL 0.60   CALCIUM mg/dL 10.3   AST (SGOT) U/L 20   ALT (SGPT) U/L 6   ANION GAP mmol/L 8.7   ALBUMIN g/dL 3.70     Results from last 7 days   Lab Units 08/05/21 2039   TROPONIN T ng/mL <0.010             Results from last 7 days   Lab Units 08/06/21  0412 08/05/21 2039   WBC 10*3/mm3 3.52 3.84   HEMOGLOBIN g/dL 11.8* 12.5   HEMATOCRIT % 35.9 39.0   PLATELETS 10*3/mm3 190 216   MCV fL 96.5 96.8   NEUTROPHIL % %  --  50.4 "   LYMPHOCYTE % %  --  41.4   MONOCYTES % %  --  6.8   EOSINOPHIL % %  --  0.8   BASOPHIL % %  --  0.3     Results from last 7 days   Lab Units 08/06/21  0412 08/05/21 2203 08/05/21 2040   INR  1.32* 2.65* 2.78*               Invalid input(s): LDLCALC          Glucose   Date/Time Value Ref Range Status   08/05/2021 2358 118 70 - 130 mg/dL Final     Comment:     Meter: [id number] : [id number] Arron Muniz RN                 Results from last 7 days   Lab Units 08/05/21 2207   COVID19  Not Detected               2D echocardiogram 4/8/2021:  · The right atrial cavity is severely dilated.  · Left atrium is dilated severely  · Estimated left ventricular EF = 60% Left ventricular systolic function is normal.  · Left ventricular diastolic function was indeterminate.  · Mildly reduced right ventricular systolic function noted.  · Saline test results are negative.  Imaging:   Imaging Results (All)     Procedure Component Value Units Date/Time    CT Head Without Contrast [[id number]] Collected: 08/05/21 2115     Updated: 08/05/21 2115    Narrative:        Patient: PALOMA CAPPS  Time Out: 21:14  Exam(s): CT HEAD Without Contrast     EXAM:    CT Head Without Intravenous Contrast    CLINICAL HISTORY:     Reason for exam: headache.    TECHNIQUE:    Axial computed tomography images of the head brain without intravenous   contrast.  CTDI is 54.8 mGy and DLP is 958 mGy-cm.  This CT exam was   performed according to the principle of ALARA (As Low As Reasonably   Achievable) by using one or more of the following dose reduction   techniques: automated exposure control, adjustment of the mA and or kV   according to patient size, and or use of iterative reconstruction   technique.    COMPARISON:    No relevant prior studies available.    FINDINGS:    Brain:  Subacute subdural hematoma along the left cerebral convexity   measuring to 12 mm in maximum thickness.  Question subtle diffuse   subarachnoid hemorrhage.  Findings are  limited secondary to mass-effect   on the sulci from the adjacent subdural hematomas.  Subacute subdural   hematoma with some acute blood products along the right cerebral   convexity measuring up to 5 mm in maximum thickness.  Chronic small   vessel ischemic disease and senescent changes.    Midline shift:  9 mm of rightward midline shift.    Ventricles:  Unremarkable.  No ventriculomegaly.    Bones joints:  Unremarkable.  No acute calvarial fracture.    Soft tissues:  Unremarkable.    Sinuses:  Unremarkable as visualized.    Mastoid air cells:  Unremarkable as visualized.    IMPRESSION:       1.  Subacute subdural hematoma along the left cerebral convexity   measuring to 12 mm in maximum thickness.  2.  Question subtle diffuse subarachnoid hemorrhage.  Findings are   limited secondary to mass-effect on the sulci from the adjacent subdural   hematomas.  3.  9 mm of rightward midline shift.      Impression:          Electronically signed by Rusty Martínez MD on 08-05-21 at 2114          I reviewed the patient's new clinical results.  I personally viewed and interpreted the patient's imaging results:        Medication Review:   levETIRAcetam, 500 mg, Intravenous, Q12H  sodium chloride, 10 mL, Intravenous, Q12H        niCARdipine, 5-15 mg/hr        ASSESSMENT:   1. Intracranial hemorrhage: Subdural hematoma with questionable diffuse subarachnoid hemorrhage  2. Chronic anticoagulation therapy  3. History of GI bleed due to gastritis and duodenal ulcer  4. Atrial fibrillation, permanent pacemaker in position  5. COPD by history, no sign of exacerbation  6. Diastolic congestive heart failure, compensated  7. Monoclonal gammopathy of unknown significance  8. Pulmonary hypertension by history but no apparent pulmonary hypertension on the most recent echocardiogram  9. Obstructive sleep apnea      PLAN:  Patient had her anticoagulation reversed with Kcentra  Repeat INR is down to 1.3  Patient need to have a stat CT of  the head this morning to follow-up on the bleeding  Discussed with the neurosurgery  Patient is doing good neurologically with no obvious neurological deficit headache or any hemodynamic problems  Continue to monitor in the ICU  We will need to discuss when it will be safe to resume anticoagulation once okay with neurosurgery  Discussed with neurosurgery, patient and with the family at the bedside.  Speech therapy to evaluate and will treat accordingly    Disposition: Monitor in the ICU for 1 more day    Bella Vasquez MD  08/06/21  09:20 EDT        Dictated utilizing Dragon dictation

## 2021-08-06 NOTE — H&P
Group: [facility]         Critical care admission note    Patient Identification:  Brittany Villeda  86 y.o.  female  1935  [id number]                CC: Slurred speech and headache    History of Present Illness:  86-year-old female who presents with slurred speech and headache. This has been going on for approximately 2 or 3 days, with intermittent headache. The patient has developed some weakness in her lower extremities but that has been going on for several weeks. Apparently her family last spoke to her over the phone and she was normal at 7 PM. Now the patient is manifesting slurred speech. She does take Coumadin at home for atrial fibrillation.  I discussed the case with Dr. Burnett. CT scan is showing intracerebral hemorrhage. Neurosurgery has been consulted by Dr. Burnett and they have ordered K-Centra be administered. Patient's INR is therapeutic since she is on Coumadin.  Old records reviewed.  Discharge summary was from May of this year.  The patient was recently here in the hospital for hemorrhagic gastritis, duodenal ulcer.  She was apparently still considered appropriate candidate for anticoagulation therapy after the discharge of that hospital stay.      Review of Systems   Constitutional: Negative for diaphoresis and fever.   HENT: Negative for ear discharge and sore throat.    Eyes: Negative for pain and visual disturbance.   Respiratory: Negative for cough and shortness of breath.    Cardiovascular: Negative for chest pain and leg swelling.   Gastrointestinal: Negative for abdominal pain and diarrhea.   Endocrine: Negative for cold intolerance and polyuria.   Genitourinary: Negative for dysuria and hematuria.   Musculoskeletal: Negative for joint swelling and myalgias.   Skin: Negative for rash and wound.   Neurological: Positive for speech difficulty and headaches. Negative for numbness.   Hematological: Negative for adenopathy. Does not bruise/bleed easily.    Psychiatric/Behavioral: Negative for agitation and confusion.       Past Medical History:  Past Medical History:   Diagnosis Date   • Acute UTI (urinary tract infection) 4/8/2021   • Anemia    • Arrhythmia    • Arthritis    • Asthma    • Bradycardia    • Chest pain    • Choledocholithiasis 5/9/2021   • Colitis    • COPD (chronic obstructive pulmonary disease) (CMS/HCC)    • Diastolic dysfunction    • Essential hypertension 5/12/2016   • GERD (gastroesophageal reflux disease)    • Heart block    • Hypertension    • Hypertensive heart disease    • Hyperthyroidism    • Kidney stone    • Leukopenia    • Low back pain    • MGUS (monoclonal gammopathy of unknown significance)    • Nephrolithiasis    • Obesity    • Paroxysmal atrial fibrillation (CMS/HCC)    • Peptic ulceration    • Persistent atrial fibrillation (CMS/HCC)    • PSVT (paroxysmal supraventricular tachycardia) (CMS/HCC)    • Pulmonary hypertension (CMS/HCC)    • Rectal bleed    • Sleep apnea    • Systemic hypertension    • Trifascicular block    • 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)    • Ventricular tachycardia (CMS/HCC)     nonsustained   • Vertigo        Past Surgical History:  Past Surgical History:   Procedure Laterality Date   • BACK SURGERY      lumbar fusion   • CARDIAC ELECTROPHYSIOLOGY PROCEDURE N/A 11/7/2018    Procedure: Pacemaker DC new   [place];  Surgeon: Jesus Granda MD;  Location: [facility] CATH INVASIVE LOCATION;  Service: Cardiology   • CHOLECYSTECTOMY     • COLONOSCOPY  06/16/2014    colitis, cryptitis,  tics, NBIH, TA w/low grade dysplasia   • COLONOSCOPY N/A 10/28/2019    Procedure: COLONOSCOPY WITH COLD AND HOT POLYPECTOMIES;  Surgeon: Micaela English MD;  Location: [facility] ENDOSCOPY;  Service: Gastroenterology   • ENDOSCOPY N/A 5/13/2021    Procedure: ESOPHAGOGASTRODUODENOSCOPY with BX;  Surgeon: Stefan Mcfadden MD;  Location: [facility] ENDOSCOPY;  Service: Gastroenterology;  Laterality: N/A;   EPIGASTRIC PAIN  --DUODENAL ULCER, HEMORRHAGIC GASTRITIS, HIATAL HERNIA    • HEMORRHOIDECTOMY     • HYSTERECTOMY     • JOINT REPLACEMENT     • KNEE ARTHROPLASTY     • MYOMECTOMY     • PACEMAKER IMPLANTATION     • SHOULDER SURGERY     • SINUS SURGERY     • TOE NAIL AMPUTATION  2019   • TONSILLECTOMY          Home Meds:  Reviewed and reconciled    Allergies:  Allergies   Allergen Reactions   • Codeine Hallucinations   • Amitriptyline Rash   • Amoxicillin-Pot Clavulanate Rash   • Aspirin Unknown - Low Severity     Patient doesn't know why   • Bactrim [Sulfamethoxazole-Trimethoprim] Rash   • Carisoprodol-Aspirin-Codeine Palpitations   • Iodinated Diagnostic Agents Rash   • Latex Rash   • Naproxen Rash   • Nsaids Unknown - Low Severity     unknown   • Soma Compound With Codeine [Carisoprodol-Aspirin-Codeine] Rash   • Sulfa Antibiotics Rash   • Tramadol Palpitations     heart races        Social History:   Social History     Socioeconomic History   • Marital status:      Spouse name: Not on file   • Number of children: 10   • Years of education: High School   • Highest education level: Not on file   Tobacco Use   • Smoking status: Former Smoker     Packs/day: 1.50     Years: 10.00     Pack years: 15.00     Start date:      Quit date:      Years since quittin.6   • Smokeless tobacco: Never Used   • Tobacco comment: QUIT SMOKING    Vaping Use   • Vaping Use: Never used   Substance and Sexual Activity   • Alcohol use: No   • Drug use: Defer   • Sexual activity: Defer       Family History:  Family History   Problem Relation Age of Onset   • Diabetes Mother    • Breast cancer Sister    • Kidney cancer Sister    • Heart disease Sister    • Prostate cancer Brother    • Prostate cancer Brother    • Prostate cancer Brother    • Malig Hyperthermia Neg Hx        Physical Exam:  /66   Pulse 70   Temp 97.8 °F (36.6 °C) (Tympanic)   Resp 14   LMP  (LMP Unknown)   SpO2 98%  There is no height or  weight on file to calculate BMI. 98%    Physical Exam  HENT:      Right Ear: External ear normal.      Left Ear: External ear normal.      Nose: Nose normal.   Eyes:      Conjunctiva/sclera: Conjunctivae normal.      Pupils: Pupils are equal, round, and reactive to light.   Neck:      Thyroid: No thyromegaly.      Vascular: No JVD.      Trachea: No tracheal deviation.   Cardiovascular:      Rate and Rhythm: Normal rate and regular rhythm.      Heart sounds: Normal heart sounds. No murmur heard.     Pulmonary:      Effort: Pulmonary effort is normal.      Breath sounds: Normal breath sounds.   Abdominal:      Palpations: Abdomen is soft.      Tenderness: There is no abdominal tenderness. There is no rebound.      Comments: Cannot palpate liver or spleen enlargement   Musculoskeletal:         General: No deformity. Normal range of motion.      Cervical back: Neck supple. No rigidity.   Skin:     General: Skin is warm.      Findings: No rash.      Comments: No palpable nodules   Neurological:      General: No focal deficit present.      Mental Status: She is alert and oriented to person, place, and time.      Cranial Nerves: No cranial nerve deficit.      Motor: No weakness.      Comments: I cannot tell any slurred speech.   Psychiatric:         Mood and Affect: Mood normal.         Thought Content: Thought content normal.         LABS:  COVID19   Date Value Ref Range Status   06/22/2021 Not Detected Not Detected - Ref. Range Final       Lab Results   Component Value Date    CALCIUM 10.3 08/05/2021     Results from last 7 days   Lab Units 08/05/21 2039   SODIUM mmol/L 136   POTASSIUM mmol/L 3.9   CHLORIDE mmol/L 102   CO2 mmol/L 25.3   BUN mg/dL 7*   CREATININE mg/dL 0.60   GLUCOSE mg/dL 150*   CALCIUM mg/dL 10.3   WBC 10*3/mm3 3.84   HEMOGLOBIN g/dL 12.5   PLATELETS 10*3/mm3 216   ALT (SGPT) U/L 6   AST (SGOT) U/L 20     Lab Results   Component Value Date    CKTOTAL 194 (H) 04/13/2021    TROPONINT <0.010 08/05/2021      Results from last 7 days   Lab Units 08/05/21 2039   TROPONIN T ng/mL <0.010                     Results from last 7 days   Lab Units 08/05/21 2203 08/05/21 2040 08/02/21  0000   INR  2.65* 2.78* 3.20         Lab Results   Component Value Date    TSH 0.544 04/11/2021     Estimated Creatinine Clearance: 55.6 mL/min (by C-G formula based on SCr of 0.6 mg/dL).         Imaging: I personally visualized the images of CT scan of the head.  Radiologist interpretation is as follows:  IMPRESSION:       1.  Subacute subdural hematoma along the left cerebral convexity   measuring to 12 mm in maximum thickness.  2.  Question subtle diffuse subarachnoid hemorrhage.  Findings are   limited secondary to mass-effect on the sulci from the adjacent subdural   hematomas.  3.  9 mm of rightward midline shift.      Assessment:  Intracranial hemorrhage.  Chronic anticoagulation therapy  History of GI bleed due to gastritis and duodenal ulcer  Atrial fibrillation        Recommendations:  Admit to the intensive care unit.  Consult neurosurgery.  Give IV K Centra.  Patient takes Coumadin at home.  Obviously discontinue Coumadin.  I think this patient has proven to us that she is not appropriate candidate for further anticoagulation in the future.  She was already in this hospital in May with GI bleed but somehow still restarted on warfarin.  Currently her neurologic exam is normal to me.  I do not observe any speech impediment described above.  Monitor neurologic status every hour.  Repeat CT of the head in the morning.  Hold home medications for now and resume gradually over the next few days.    Total critical care time 32 minutes excluding any separately billable procedure time      Brian Louie MD  8/5/2021  22:27 EDT      Much of this encounter note is an electronic transcription/translation of spoken language to printed text using Dragon Software.

## 2021-08-06 NOTE — PLAN OF CARE
Goal Outcome Evaluation:  Plan of Care Reviewed With: patient           Outcome Summary: Patient seen for clinical swallow assessment. Slight L facial droop noted. No dysarthria this date. Initially no overt s/s of aspiration with ice and consecutive drinks thins via cup/straw. Voice clear. Pt exhibited a swallow delay with puree, c/o obstruction and wet voice noted. Pt then developed voice change after the swallow with mech soft, thins, and nectar via straw. SLP recs nectar via cup clears. Meds crushed with nectar. Will follow for VFSS next date to further assess.    Patient was not wearing a face mask during this therapy encounter. Therapist used appropriate personal protective equipment including mask, eye protection and gloves.  Mask used was standard procedure mask. Appropriate PPE was worn during the entire therapy session. Hand hygiene was completed before and after therapy session. Patient is not in enhanced droplet precautions.

## 2021-08-06 NOTE — SIGNIFICANT NOTE
08/06/21 1102   OTHER   Discipline occupational therapist   Rehab Time/Intention   Session Not Performed   (pt had run of Vtach when stood with PT this AM. Discuss with RN who states to hold OT eval today and follow up next service date.)   Recommendation   OT - Next Appointment 08/07/21

## 2021-08-06 NOTE — ED NOTES
.RN wearing all appropriate PPE during entire encounter with patient.        Behringer, Catherine, RN  08/05/21 8503

## 2021-08-06 NOTE — NURSING NOTE
came to bedside and gave orders to start Keppra Q12 500mg.  At this time no other interventions taken place.  Will re-evaluate after repeat INR. CT last night at 8:58pm. NIH 3 upon arrival to ICU from ER. Left facial droop has improved. NPO. SBP <150. Patient is Atrial paced and takes coumadin at home.  Neurological intact with Q1 Neuro checks performed.

## 2021-08-06 NOTE — THERAPY EVALUATION
Acute Care - Speech Language Pathology   Swallow Initial Evaluation [facility]     Patient Name: Brittany Villeda  : 1935  MRN: [id number]  Today's Date: 2021               Admit Date: 2021    Visit Dx:     ICD-10-CM ICD-9-CM   1. Subdural hematoma (CMS/HCC)  S06.5X9A 432.1   2. 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)   • Other chest pain   • Lichen sclerosus   • Heart block   • Sleep-related hypoxia   • Bradycardia   • Shoulder arthritis   • History of atrial fibrillation   • Pacemaker   • Paroxysmal SVT (supraventricular tachycardia) (CMS/HCC)   • History of chest pain   • Palpitations   • Atrial fibrillation (CMS/HCC)   • Rectal bleeding   • Arthritis   • Ascending aortic aneurysm (CMS/HCC)   • Mediastinal lymphadenopathy   • Immobility   • Left knee pain   • Chronic anticoagulation   • Hemarthrosis of left knee   • Anemia   • Obesity (BMI 30-39.9)   • Generalized weakness   • Dysautonomia (CMS/HCC)   • Weakness of both lower extremities   • Macrocytosis   • Hypercalcemia   • Arthritis of right wrist   • Hyperparathyroidism (CMS/HCC)   • Choledocholithiasis   • Essential hypertension   • Hyperglycemia   • Epigastric pain   • Duodenal ulcer   • Hemorrhagic gastritis   • Exertional dyspnea   • COPD (chronic obstructive pulmonary disease) (CMS/HCC)   • Functional quadriplegia (CMS/HCC)   • Hip pain   • Osteoarthritis of glenohumeral joint   • Other malaise and fatigue   • Shoulder pain   •  ICH (intracerebral hemorrhage) (CMS/HCC)   • Subdural hematoma (CMS/HCC)     Past Medical History:   Diagnosis Date   • Acute UTI (urinary tract infection) 4/8/2021   • Anemia    • Arrhythmia    • Arthritis    • Asthma    • Bradycardia    • Chest pain    • Choledocholithiasis 5/9/2021   • Colitis    • COPD (chronic obstructive pulmonary disease) (CMS/HCC)    • Diastolic dysfunction    • Essential hypertension 5/12/2016   • GERD (gastroesophageal reflux disease)    • Heart block    • Hypertension    • Hypertensive heart disease    • Hyperthyroidism    • Kidney stone    • Leukopenia    • Low back pain    • MGUS (monoclonal gammopathy of unknown significance)    • Nephrolithiasis    • Obesity    • Paroxysmal atrial fibrillation (CMS/HCC)    • Peptic ulceration    • Persistent atrial fibrillation (CMS/HCC)    • PSVT (paroxysmal supraventricular tachycardia) (CMS/HCC)    • Pulmonary hypertension (CMS/HCC)    • Rectal bleed    • Sleep apnea    • Systemic hypertension    • Trifascicular block    • 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)    • Ventricular tachycardia (CMS/HCC)     nonsustained   • Vertigo      Past Surgical History:   Procedure Laterality Date   • BACK SURGERY      lumbar fusion   • CARDIAC ELECTROPHYSIOLOGY PROCEDURE N/A 11/7/2018    Procedure: Pacemaker DC new   [place];  Surgeon: Jesus Granda MD;  Location: [facility] CATH INVASIVE LOCATION;  Service: Cardiology   • CHOLECYSTECTOMY     • COLONOSCOPY  06/16/2014    colitis, cryptitis,  tics, NBIH, TA w/low grade dysplasia   • COLONOSCOPY N/A 10/28/2019    Procedure: COLONOSCOPY WITH COLD AND HOT POLYPECTOMIES;  Surgeon: Micaela English MD;  Location: [facility] ENDOSCOPY;  Service: Gastroenterology   • ENDOSCOPY N/A 5/13/2021    Procedure: ESOPHAGOGASTRODUODENOSCOPY with BX;  Surgeon: Stefan Mcfadden MD;  Location: [facility] ENDOSCOPY;  Service: Gastroenterology;  Laterality: N/A;  EPIGASTRIC PAIN  --DUODENAL ULCER, HEMORRHAGIC  GASTRITIS, HIATAL HERNIA    • HEMORRHOIDECTOMY     • HYSTERECTOMY     • JOINT REPLACEMENT     • KNEE ARTHROPLASTY     • MYOMECTOMY     • PACEMAKER IMPLANTATION     • SHOULDER SURGERY     • SINUS SURGERY     • TOE NAIL AMPUTATION  03/04/2019   • TONSILLECTOMY         SLP Recommendation and Plan  SLP Swallowing Diagnosis: suspected pharyngeal dysphagia  SLP Diet Recommendation: nectar thick liquids, clear liquid diet  Recommended Precautions and Strategies: upright posture during/after eating, no straw  SLP Rec. for Method of Medication Administration: meds crushed, with thick liquids, as tolerated     Monitor for Signs of Aspiration: yes  Recommended Diagnostics: reassess via VFSS ([facility]     Anticipated Discharge Disposition (SLP): unknown     Therapy Frequency (Swallow): PRN  Predicted Duration Therapy Intervention (Days): until discharge                         Plan of Care Reviewed With: patient  Outcome Summary: Patient seen for clinical swallow assessment. Slight L facial droop noted. No dysarthria this date. Initially no overt s/s of aspiration with ice and consecutive drinks thins via cup/straw. Voice clear. Pt exhibited a swallow delay with puree, c/o obstruction and wet voice noted. Pt then developed voice change after the swallow with mech soft, thins, and nectar via straw. SLP recs nectar via cup clears. Meds crushed with nectar. Will follow for VFSS next date to further assess.         SWALLOW EVALUATION (last 72 hours)      SLP Adult Swallow Evaluation     Row Name 08/06/21 1100                   Rehab Evaluation    Document Type  evaluation  -SH        Patient Effort  adequate  -SH           General Information    Patient Profile Reviewed  yes  -SH        Pertinent History Of Current Problem  SDH with SAH  -SH        Current Method of Nutrition  regular textures;thin liquids;other (see comments) tray held  -        Precautions/Limitations, Vision  WFL;for purposes of eval  -         Precautions/Limitations, Hearing  WFL;for purposes of eval  -        Prior Level of Function-Swallowing  no diet consistency restrictions;other (see comments) per family member present  -        Plans/Goals Discussed with  patient;agreed upon  [facility]        Barriers to Rehab  medically complex;cognitive status  -           Pain Scale: FACES Pre/Post-Treatment    Pain: FACES Scale, Pretreatment  2-->hurts little bit  -        Posttreatment Pain Rating  2-->hurts little bit back from laying in bed  -           Oral Motor Structure and Function    Dentition Assessment  natural, present and adequate  -        Volitional Swallow  delayed  -        Volitional Cough  weak  -           Oral Musculature and Cranial Nerve Assessment    Oral Labial or Buccal Impairment, Detail, Cranial Nerve VII (Facial):  left labial droop  -           Clinical Swallow Eval    Clinical Swallow Evaluation Summary  Patient seen for clinical swallow assessment. Slight L facial droop noted. No dysarthria this date. Initially no overt s/s of aspiration with ice and consecutive drinks thins via cup/straw. Voice clear. Pt exhibited a swallow delay with puree, c/o obstruction and wet voice noted. Pt then developed voice change (strained voice) after the swallow with mech soft, thins, and nectar via straw. SLP recs nectar via cup clears. Meds crushed with nectar. Will follow for VFSS next date to further assess.  -           Clinical Impression    SLP Swallowing Diagnosis  suspected pharyngeal dysphagia  -        Functional Impact  risk of aspiration/pneumonia  -           Recommendations    Therapy Frequency (Swallow)  PRN  -        Predicted Duration Therapy Intervention (Days)  until discharge  -        SLP Diet Recommendation  nectar thick liquids;clear liquid diet  -        Recommended Diagnostics  reassess via VFSS ([facility] [place])  -        Recommended Precautions and Strategies  upright posture during/after eating;no straw   -        Oral Care Recommendations  Oral Care BID/PRN  -        SLP Rec. for Method of Medication Administration  meds crushed;with thick liquids;as tolerated  -        Monitor for Signs of Aspiration  yes  -        Anticipated Discharge Disposition (SLP)  unknown  -           Swallow Goals (SLP)    Oral Nutrition/Hydration Goal Selection (SLP)  oral nutrition/hydration, SLP goal 1  -           Oral Nutrition/Hydration Goal 1 (SLP)    Oral Nutrition/Hydration Goal 1, SLP  Pt will chidi PO without overt s/s of aspiration.  -        Time Frame (Oral Nutrition/Hydration Goal 1, SLP)  by discharge  -          User Key  (r) = Recorded By, (t) = Taken By, (c) = Cosigned By    Initials Name Effective Dates     Desire Hoffmann MS CCC-[facility] 06/16/21 -           EDUCATION  The patient has been educated in the following areas:   Dysphagia (Swallowing Impairment).       SLP GOALS     Row Name 08/06/21 1100             Oral Nutrition/Hydration Goal 1 (SLP)    Oral Nutrition/Hydration Goal 1, SLP  Pt will chidi PO without overt s/s of aspiration.  -      Time Frame (Oral Nutrition/Hydration Goal 1, SLP)  by discharge  -        User Key  (r) = Recorded By, (t) = Taken By, (c) = Cosigned By    Initials Name Provider Type     Desire Hoffmann MS CCC-SLP Speech and Language Pathologist           SLP Outcome Measures (last 72 hours)      SLP Outcome Measures     Row Name 08/06/21 1300             SLP Outcome Measures    Outcome Measure Used?  Adult NOMS  -         Adult FCM Scores    FCM Chosen  Swallowing  -      Swallowing FCM Score  4  -        User Key  (r) = Recorded By, (t) = Taken By, (c) = Cosigned By    Initials Name Effective Dates     Desire Hoffmann MS CCC-[facility] 06/16/21 -            Time Calculation:   Time Calculation- SLP     Row Name 08/06/21 1312             Time Calculation- [facility]    SLP Start Time  1100  -      SLP Received On  08/06/21  -         Untimed Charges    93487-IR Eval Oral Pharyng  Swallow Minutes  60  -SH         Total Minutes    Untimed Charges Total Minutes  60  -SH       Total Minutes  60  -SH        User Key  (r) = Recorded By, (t) = Taken By, (c) = Cosigned By    Initials Name Provider Type    Desire Artis MS CCC-SLP Speech and Language Pathologist          Therapy Charges for Today     Code Description Service Date Service Provider Modifiers Qty    93048882076 HC ST EVAL ORAL PHARYNG SWALLOW 4 8/6/2021 Desire Hoffmann MS CCC-SLP GN 1               Desire Hoffmann MS CCC-SLP  8/6/2021

## 2021-08-06 NOTE — THERAPY EVALUATION
Patient Name: Brittany Villeda  : 1935    MRN: [id number]                              Today's Date: 2021       Admit Date: 2021    Visit Dx:     ICD-10-CM ICD-9-CM   1. Subdural hematoma (CMS/HCC)  S06.5X9A 432.1   2. 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)   • Other chest pain   • Lichen sclerosus   • Heart block   • Sleep-related hypoxia   • Bradycardia   • Shoulder arthritis   • History of atrial fibrillation   • Pacemaker   • Paroxysmal SVT (supraventricular tachycardia) (CMS/HCC)   • History of chest pain   • Palpitations   • Atrial fibrillation (CMS/HCC)   • Rectal bleeding   • Arthritis   • Ascending aortic aneurysm (CMS/HCC)   • Mediastinal lymphadenopathy   • Immobility   • Left knee pain   • Chronic anticoagulation   • Hemarthrosis of left knee   • Anemia   • Obesity (BMI 30-39.9)   • Generalized weakness   • Dysautonomia (CMS/HCC)   • Weakness of both lower extremities   • Macrocytosis   • Hypercalcemia   • Arthritis of right wrist   • Hyperparathyroidism (CMS/HCC)   • Choledocholithiasis   • Essential hypertension   • Hyperglycemia   • Epigastric pain   • Duodenal ulcer   • Hemorrhagic gastritis   • Exertional dyspnea   • COPD (chronic obstructive pulmonary disease) (CMS/HCC)   • Functional quadriplegia (CMS/HCC)   • Hip pain   • Osteoarthritis of glenohumeral joint   • Other malaise and fatigue   • Shoulder pain   • ICH (intracerebral hemorrhage) (CMS/HCC)   • Subdural hematoma  (CMS/HCC)     Past Medical History:   Diagnosis Date   • Acute UTI (urinary tract infection) 4/8/2021   • Anemia    • Arrhythmia    • Arthritis    • Asthma    • Bradycardia    • Chest pain    • Choledocholithiasis 5/9/2021   • Colitis    • COPD (chronic obstructive pulmonary disease) (CMS/HCC)    • Diastolic dysfunction    • Essential hypertension 5/12/2016   • GERD (gastroesophageal reflux disease)    • Heart block    • Hypertension    • Hypertensive heart disease    • Hyperthyroidism    • Kidney stone    • Leukopenia    • Low back pain    • MGUS (monoclonal gammopathy of unknown significance)    • Nephrolithiasis    • Obesity    • Paroxysmal atrial fibrillation (CMS/HCC)    • Peptic ulceration    • Persistent atrial fibrillation (CMS/HCC)    • PSVT (paroxysmal supraventricular tachycardia) (CMS/HCC)    • Pulmonary hypertension (CMS/HCC)    • Rectal bleed    • Sleep apnea    • Systemic hypertension    • Trifascicular block    • 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)    • Ventricular tachycardia (CMS/HCC)     nonsustained   • Vertigo      Past Surgical History:   Procedure Laterality Date   • BACK SURGERY      lumbar fusion   • CARDIAC ELECTROPHYSIOLOGY PROCEDURE N/A 11/7/2018    Procedure: Pacemaker DC new   [employer];  Surgeon: Jesus Granda MD;  Location: [facility] CATH INVASIVE LOCATION;  Service: Cardiology   • CHOLECYSTECTOMY     • COLONOSCOPY  06/16/2014    colitis, cryptitis,  tics, NBIH, TA w/low grade dysplasia   • COLONOSCOPY N/A 10/28/2019    Procedure: COLONOSCOPY WITH COLD AND HOT POLYPECTOMIES;  Surgeon: Micaela English MD;  Location: [facility] ENDOSCOPY;  Service: Gastroenterology   • ENDOSCOPY N/A 5/13/2021    Procedure: ESOPHAGOGASTRODUODENOSCOPY with BX;  Surgeon: Stefan Mcfadden MD;  Location: [facility] ENDOSCOPY;  Service: Gastroenterology;  Laterality: N/A;  EPIGASTRIC PAIN  --DUODENAL ULCER, HEMORRHAGIC GASTRITIS, HIATAL HERNIA    • HEMORRHOIDECTOMY     • HYSTERECTOMY      • JOINT REPLACEMENT     • KNEE ARTHROPLASTY     • MYOMECTOMY     • PACEMAKER IMPLANTATION     • SHOULDER SURGERY     • SINUS SURGERY     • TOE NAIL AMPUTATION  03/04/2019   • TONSILLECTOMY       General Information     [facility] Name 08/06/21 1124          Physical Therapy Time and Intention    Document Type  evaluation;therapy note (daily note)  -     Mode of Treatment  individual therapy;physical therapy  -     Row Name 08/06/21 1124          General Information    Prior Level of Function  independent:;gait;transfer;bed mobility walks with walker at baseline  -     Existing Precautions/Restrictions  fall  -     Barriers to Rehab  medically complex  -     Row Name 08/06/21 1124          Living Environment    Lives With  child(donaldo), adult  -[facility] Name 08/06/21 1124          Cognition    Orientation Status (Cognition)  oriented x 3  -[facility] Name 08/06/21 1124          Safety Issues, Functional Mobility    Impairments Affecting Function (Mobility)  balance;coordination;endurance/activity tolerance;strength  -     Comment, Safety Issues/Impairments (Mobility)  Pt reports being tired and weak  -       User Key  (r) = Recorded By, (t) = Taken By, (c) = Cosigned By    Initials Name Provider Type     Heaven Acosta, PT Physical Therapist        Mobility     Row Name 08/06/21 1125          Bed Mobility    Bed Mobility  supine-sit;sit-supine  -     Supine-Sit [place] (Bed Mobility)  verbal cues;nonverbal cues (demo/gesture);moderate assist (50% patient effort)  -     Sit-Supine [place] (Bed Mobility)  verbal cues;nonverbal cues (demo/gesture);moderate assist (50% patient effort);2 person assist  -     Assistive Device (Bed Mobility)  bed rails;draw sheet;head of bed elevated  -     Row Name 08/06/21 1125          Transfers    Comment (Transfers)  Pt performed sit to stand x1, upon standing RN entered room and asked to get pt back in bed as pt had a run of V-tach, pt confirmed  dizziness upon sitting back in bed  -     Row Name 08/06/21 1125          Sit-Stand Transfer    Sit-Stand [place] (Transfers)  verbal cues;nonverbal cues (demo/gesture);moderate assist (50% patient effort);2 person assist  -     Assistive Device (Sit-Stand Transfers)  walker, front-wheeled  -       User Key  (r) = Recorded By, (t) = Taken By, (c) = Cosigned By    Initials Name Provider Type     Heaven Acosta PT Physical Therapist        Obj/Interventions     Row Name 08/06/21 1127          Range of Motion Comprehensive    General Range of Motion  no range of motion deficits identified  -[facility] Name 08/06/21 1127          Strength Comprehensive (MMT)    Comment, General Manual Muscle Testing (MMT) Assessment  generalized weakness noted with funcitonal mobility, B LE grossly 3+/5  -[facility] Name 08/06/21 1127          Balance    Balance Assessment  sitting dynamic balance;standing static balance  -     Dynamic Sitting Balance  mild impairment  -     Static Standing Balance  moderate impairment  -       User Key  (r) = Recorded By, (t) = Taken By, (c) = Cosigned By    Initials Name Provider Type     Heaven Acosta, PT Physical Therapist        Goals/Plan     Row Name 08/06/21 1132          Bed Mobility Goal 1 (PT)    Activity/Assistive Device (Bed Mobility Goal 1, PT)  bed mobility activities, all  -     [place] Level/Cues Needed (Bed Mobility Goal 1, PT)  contact guard assist  -     Time Frame (Bed Mobility Goal 1, PT)  1 week  -[facility] Name 08/06/21 1132          Transfer Goal 1 (PT)    Activity/Assistive Device (Transfer Goal 1, PT)  transfers, all;walker, rolling  -     [place] Level/Cues Needed (Transfer Goal 1, PT)  contact guard assist  -     Time Frame (Transfer Goal 1, PT)  1 week  -[facility] Name 08/06/21 1132          Gait Training Goal 1 (PT)    Activity/Assistive Device (Gait Training Goal 1, PT)  gait (walking locomotion);walker, rolling  -      [place] Level (Gait Training Goal 1, PT)  minimum assist (75% or more patient effort)  -     Distance (Gait Training Goal 1, PT)  30  -CH     Time Frame (Gait Training Goal 1, PT)  1 week  -       User Key  (r) = Recorded By, (t) = Taken By, (c) = Cosigned By    Initials Name Provider Type    Heaven Lynn, PT Physical Therapist        Clinical Impression     Row Name 08/06/21 1129          Pain    Additional Documentation  Pain Scale: FACES Pre/Post-Treatment (Group)  -     Row Name 08/06/21 1129          Pain Scale: FACES Pre/Post-Treatment    Pain: FACES Scale, Pretreatment  0-->no hurt  -CH     Posttreatment Pain Rating  0-->no hurt  -CH     Row Name 08/06/21 1129          Plan of Care Review    Plan of Care Reviewed With  patient  -     Outcome Summary  Pt is a 87 yo F who was admitted with slurred speech and found to have SDH and SAH. Pt presents to PT with impaired functional mobility and gait secondary to generalized weakness, impaired balance, and decreased activity tolerance. Upon standing with PT this AM, RN asked for patient to be returned to bed as she experienced a run of V-tach. PT will continue to follow to address strength, mobility, and gait.  -     Row Name 08/06/21 1124          Therapy Assessment/Plan (PT)    Patient/Family Therapy Goals Statement (PT)  to return to [facility]  -     Rehab Potential (PT)  good, to achieve stated therapy goals  -     Criteria for Skilled Interventions Met (PT)  skilled treatment is necessary  -     Row Name 08/06/21 1129          Positioning and Restraints    Pre-Treatment Position  in bed  -     Post Treatment Position  bed  -     In Bed  supine;call light within reach;encouraged to call for assist;with family/caregiver  -       User Key  (r) = Recorded By, (t) = Taken By, (c) = Cosigned By    Initials Name Provider Type    Heaven Lynn, PT Physical Therapist        Outcome Measures     Row Name 08/06/21 1133          How  much help from another person do you currently need...    Turning from your back to your side while in flat bed without using bedrails?  2  -CH     Moving from lying on back to sitting on the side of a flat bed without bedrails?  2  -CH     Moving to and from a bed to a chair (including a wheelchair)?  1  -CH     Standing up from a chair using your arms (e.g., wheelchair, bedside chair)?  2  -CH     Climbing 3-5 steps with a railing?  1  -CH     To walk in hospital room?  1  -CH     AM-PAC 6 Clicks Score (PT)  9  -     Row Name 08/06/21 1133          Functional Assessment    Outcome Measure Options  AM-PAC 6 Clicks Basic Mobility (PT)  -       User Key  (r) = Recorded By, (t) = Taken By, (c) = Cosigned By    Initials Name Provider Type     Heaven Acosta PT Physical Therapist                       Physical Therapy Education                 Title: PT OT SLP Therapies (In Progress)     Topic: Physical Therapy (In Progress)     Point: Mobility training (Done)     Learning Progress Summary           Patient Acceptance, E,TB,D, VU,NR by  at 8/6/2021 1134                   Point: Home exercise program (Not Started)     Learner Progress:  Not documented in this visit.          Point: Body mechanics (Done)     Learning Progress Summary           Patient Acceptance, E,TB,D, VU,NR by  at 8/6/2021 1134                   Point: Precautions (Done)     Learning Progress Summary           Patient Acceptance, E,TB,D, VU,NR by  at 8/6/2021 1134                               User Key     Initials Effective Dates Name Provider Type [facility] 06/16/21 -  Heaven Acosta PT Physical Therapist PT              PT Recommendation and Plan  Planned Therapy Interventions (PT): balance training, bed mobility training, gait training, home exercise program, patient/family education, strengthening, transfer training  Plan of Care Reviewed With: patient  Outcome Summary: Pt is a 87 yo F who was admitted with slurred  speech and found to have SDH and SAH. Pt presents to PT with impaired functional mobility and gait secondary to generalized weakness, impaired balance, and decreased activity tolerance. Upon standing with PT this AM, RN asked for patient to be returned to bed as she experienced a run of V-tach. PT will continue to follow to address strength, mobility, and gait.     Time Calculation:   PT Charges     Row Name 08/06/21 1134             Time Calculation    Start Time  1009  -CH      Stop Time  1025  -CH      Time Calculation (min)  16 min  -CH      PT Received On  08/06/21  -      PT - Next Appointment  08/07/21  -      PT Goal Re-Cert Due Date  08/13/21  -         Time Calculation- PT    Total Timed Code Minutes- PT  8 minute(s)  -CH         Timed Charges    84309 - PT Therapeutic Activity Minutes  8  -CH         Untimed Charges    PT Eval/Re-eval Minutes  10  -CH         Total Minutes    Timed Charges Total Minutes  8  -CH      Untimed Charges Total Minutes  10  -CH       Total Minutes  18  -CH        User Key  (r) = Recorded By, (t) = Taken By, (c) = Cosigned By    Initials Name Provider Type     Heaven Acosta, PT Physical Therapist        Therapy Charges for Today     Code Description Service Date Service Provider Modifiers Qty    09429138231 HC PT THERAPEUTIC ACT EA 15 MIN 8/6/2021 Heaven Acosta, PT GP 1    01503512232 HC PT EVAL MOD COMPLEXITY 2 8/6/2021 Heaven Acosta PT GP 1          PT G-Codes  Outcome Measure Options: AM-PAC 6 Clicks Basic Mobility (PT)  AM-PAC 6 Clicks Score (PT): 9    Heaven Acosta PT  8/6/2021

## 2021-08-07 ENCOUNTER — APPOINTMENT (OUTPATIENT)
Dept: GENERAL RADIOLOGY | Facility: HOSPITAL | Age: 86
End: 2021-08-07

## 2021-08-07 ENCOUNTER — APPOINTMENT (OUTPATIENT)
Dept: CT IMAGING | Facility: HOSPITAL | Age: 86
End: 2021-08-07

## 2021-08-07 LAB
DEPRECATED RDW RBC AUTO: 44.9 FL (ref 37–54)
ERYTHROCYTE [DISTWIDTH] IN BLOOD BY AUTOMATED COUNT: 13.2 % (ref 12.3–15.4)
GLUCOSE BLDC GLUCOMTR-MCNC: 105 MG/DL (ref 70–130)
GLUCOSE BLDC GLUCOMTR-MCNC: 80 MG/DL (ref 70–130)
GLUCOSE BLDC GLUCOMTR-MCNC: 97 MG/DL (ref 70–130)
HCT VFR BLD AUTO: 35.7 % (ref 34–46.6)
HGB BLD-MCNC: 11.7 G/DL (ref 12–15.9)
INR PPP: 1.14 (ref 0.9–1.1)
MCH RBC QN AUTO: 30.8 PG (ref 26.6–33)
MCHC RBC AUTO-ENTMCNC: 32.8 G/DL (ref 31.5–35.7)
MCV RBC AUTO: 93.9 FL (ref 79–97)
PLATELET # BLD AUTO: 206 10*3/MM3 (ref 140–450)
PMV BLD AUTO: 8.7 FL (ref 6–12)
PROTHROMBIN TIME: 14.5 SECONDS (ref 11.7–14.2)
RBC # BLD AUTO: 3.8 10*6/MM3 (ref 3.77–5.28)
WBC # BLD AUTO: 3.83 10*3/MM3 (ref 3.4–10.8)

## 2021-08-07 PROCEDURE — 85610 PROTHROMBIN TIME: CPT | Performed by: NURSE PRACTITIONER

## 2021-08-07 PROCEDURE — 85027 COMPLETE CBC AUTOMATED: CPT | Performed by: INTERNAL MEDICINE

## 2021-08-07 PROCEDURE — 99232 SBSQ HOSP IP/OBS MODERATE 35: CPT | Performed by: NEUROLOGICAL SURGERY

## 2021-08-07 PROCEDURE — 92611 MOTION FLUOROSCOPY/SWALLOW: CPT

## 2021-08-07 PROCEDURE — 70450 CT HEAD/BRAIN W/O DYE: CPT

## 2021-08-07 PROCEDURE — 25010000002 LEVETIRACETAM IN NACL 0.82% 500 MG/100ML SOLUTION: Performed by: NEUROLOGICAL SURGERY

## 2021-08-07 PROCEDURE — 82962 GLUCOSE BLOOD TEST: CPT

## 2021-08-07 PROCEDURE — 74230 X-RAY XM SWLNG FUNCJ C+: CPT

## 2021-08-07 RX ADMIN — BARIUM SULFATE 1 TEASPOON(S): 0.6 CREAM ORAL at 09:12

## 2021-08-07 RX ADMIN — BARIUM SULFATE 135 ML: 980 POWDER, FOR SUSPENSION ORAL at 09:12

## 2021-08-07 RX ADMIN — LEVETIRACETAM 500 MG: 5 INJECTION INTRAVENOUS at 15:47

## 2021-08-07 RX ADMIN — SODIUM CHLORIDE, PRESERVATIVE FREE 10 ML: 5 INJECTION INTRAVENOUS at 08:21

## 2021-08-07 RX ADMIN — BARIUM SULFATE 50 ML: 400 SUSPENSION ORAL at 09:12

## 2021-08-07 RX ADMIN — BARIUM SULFATE 55 ML: 0.81 POWDER, FOR SUSPENSION ORAL at 09:12

## 2021-08-07 RX ADMIN — SODIUM CHLORIDE, PRESERVATIVE FREE 10 ML: 5 INJECTION INTRAVENOUS at 20:01

## 2021-08-07 RX ADMIN — LEVETIRACETAM 500 MG: 5 INJECTION INTRAVENOUS at 04:50

## 2021-08-07 NOTE — PLAN OF CARE
Goal Outcome Evaluation:  Progress: no change   VSS overnight. No neuro changes. Moving all extremities, A/Ox4, NIH 2. AM CT showed no change in SDH. Pt slept between care.

## 2021-08-07 NOTE — SIGNIFICANT NOTE
08/07/21 1144   OTHER   Discipline occupational therapist   Rehab Time/Intention   Session Not Performed other (see comments)  (per RN, pt having dusty hole procedure tomorrow or Monday, OT will f/u post op pending activity orders and appropriateness for therapy.)   Recommendation   OT - Next Appointment 08/09/21

## 2021-08-07 NOTE — SIGNIFICANT NOTE
08/07/21 1143   OTHER   Discipline physical therapist   Rehab Time/Intention   Session Not Performed other (see comments)  (discussed with RN, pt now possibly having dusty hole procedure on monday, will follow up.)   Recommendation   PT - Next Appointment 08/09/21

## 2021-08-07 NOTE — PLAN OF CARE
Goal Outcome Evaluation:  Plan of Care Reviewed With: patient        Progress: no change  Outcome Summary: Video swallow study performed this date. Mild oropharyngeal dysphagia characterized by x2 instances penetration large bolus thin liquids by cup and straw. All material is ejected from the airway post prandial. Intermittent minimal pharyngeal residue clears with a liquid wash or dry swallow. No additional incidents of penetration, aspiration, or significant oropharyngeal residue across consistencies.     Recommend: regular and thin liquid diet. Medications: whole with thin liquids or in puree as tolerated by pt. Compensations: small bites/sips, single sips, upright for all PO, single sips, alternate liquids/solids, dry swallow as needed.     SLP will follow for dysphagia therapy, carryover of swallow compensations and strategies. If pt is unable to moderate liquid bolus/sip size, or in setting of negative pulmonary or respiratory changes, could consider downgrade to nectar thick liquids - however, all penetrate material was ejected from the airway under limited fluoroscopy trials.

## 2021-08-07 NOTE — PLAN OF CARE
Goal Outcome Evaluation:   Pt. VSS. Alert and oriented resting in bed. Pt. Only complaints of mild HA and Bilateral lower extremity weakness. Plan to continue monitoring pt. Until planned intervention is confirmed.

## 2021-08-07 NOTE — PROGRESS NOTES
Neurosurgery progress note    Chief complaint: Bilateral symptomatic subdural hematomas with history of ground-level fall and Coumadin use        Subjective: No events reported overnight.  Her sister is at the bedside and reports that her speech and behavior seems different than usual and that she is speaking much slower than usual.  She continues to have bilateral lower extremity weakness which I suspect is related to cortical compression from the bilateral subdural hematomas.      Objective:  Vitals:    08/07/21 1300 08/07/21 1315 08/07/21 1330 08/07/21 1345   BP: 118/64      Pulse: 70 70 70 70   Resp:       Temp:       TempSrc:       SpO2: 99% 98% 97% 98%   Weight:       Height:           Physical exam  Awake, alert, oriented x3  Pupils are equal round reactive to light  Face is symmetric  Speech is slow but clear and easy to understand  Appears to have a short attention span, but not in distress  Follows commands x4 with no focal deficits in bilateral upper extremities  5/5 strength throughout in bilateral hand , biceps, triceps  Occult to assess lower extremity strength as there appears to be some poor effort however able to move against resistance with greater than 4/5 strength with bilateral DF/PF, 3/5 strength in bilateral knee extension and bilateral hip flexion.  It is very difficult to get her to raise her legs off the bed  No clonus  No Rome's    Imaging studies  I personally reviewed the following imaging studies..  CT head without contrast from August 6, 2021 and August 7, 2021  The 2 studies show bilateral acute on chronic subdural hematomas.  The left is much larger than the right.  The right measures 5 to 6 mm in thickness while the left is measuring 9 to 10 mm in thickness at its greatest depth.  There is slightly more mass-effect and midline shift from the left hematoma.  There is significant flattening of the underlying cortical surface from both subdural hematomas.  The majority of the  fluid collections appears to be more chronic with some interspersed acute hemorrhage.      Assessment/plan  86-year-old female with symptomatic, bilateral subdural hematomas  -The INR today was 1.14 and appears to be successfully reversed  -I reviewed again the CT head without contrast from yesterday and today.  There does not appear to be significant change with the bilateral subdural hematomas however there is significant mass-effect and flattening of the underlying cortical surface.  -I think the changes in her speech, memory, and difficulty walking is related to mass-effect from the subdural hematomas.  I have recommended bilateral bur holes to evacuate the subdural hematomas.  I reviewed the risks and benefits of this procedure with the patient and her daughter who is at the bedside as well as another daughter who was on the phone.  They want to discuss amongst themselves about proceeding with the intervention.  I have recommended that we continue with the patient n.p.o. past midnight and consider bilateral bur holes tomorrow to evacuate these hematomas  -For now I recommend we continue to monitor her closely in the ICU.  If there is any decline in her neurologic exam recommend repeat CT head emergently  -Recommend maintain systolic blood pressure less than 150  -Of note I have also reviewed with the patient and family the possibility of a bilateral middle meningeal artery embolization.  She does have a history of iodine allergy.  We discussed that the procedure would still be possible however she would likely have to be pretreated with Benadryl and steroids.  At this point I think the priority is more considering bur holes versus a craniotomy to evacuate the underlying subdural hematomas and remove the mass-effect.  The family said they will get back to me when they have decided to proceed with intervention.  I will continue to follow closely

## 2021-08-07 NOTE — MBS/VFSS/FEES
Acute Care - Speech Language Pathology   Swallow Initial Evaluation [facility]     Patient Name: Brittany Villeda  : 1935  MRN: [id number]  Today's Date: 2021               Admit Date: 2021    Visit Dx:     ICD-10-CM ICD-9-CM   1. Subdural hematoma (CMS/HCC)  S06.5X9A 432.1   2. 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)   • Other chest pain   • Lichen sclerosus   • Heart block   • Sleep-related hypoxia   • Bradycardia   • Shoulder arthritis   • History of atrial fibrillation   • Pacemaker   • Paroxysmal SVT (supraventricular tachycardia) (CMS/HCC)   • History of chest pain   • Palpitations   • Atrial fibrillation (CMS/HCC)   • Rectal bleeding   • Arthritis   • Ascending aortic aneurysm (CMS/HCC)   • Mediastinal lymphadenopathy   • Immobility   • Left knee pain   • Chronic anticoagulation   • Hemarthrosis of left knee   • Anemia   • Obesity (BMI 30-39.9)   • Generalized weakness   • Dysautonomia (CMS/HCC)   • Weakness of both lower extremities   • Macrocytosis   • Hypercalcemia   • Arthritis of right wrist   • Hyperparathyroidism (CMS/HCC)   • Choledocholithiasis   • Essential hypertension   • Hyperglycemia   • Epigastric pain   • Duodenal ulcer   • Hemorrhagic gastritis   • Exertional dyspnea   • COPD (chronic obstructive pulmonary disease) (CMS/HCC)   • Functional quadriplegia (CMS/HCC)   • Hip pain   • Osteoarthritis of glenohumeral joint   • Other malaise and fatigue   • Shoulder pain   •  ICH (intracerebral hemorrhage) (CMS/HCC)   • Subdural hematoma (CMS/HCC)     Past Medical History:   Diagnosis Date   • Acute UTI (urinary tract infection) 4/8/2021   • Anemia    • Arrhythmia    • Arthritis    • Asthma    • Bradycardia    • Chest pain    • Choledocholithiasis 5/9/2021   • Colitis    • COPD (chronic obstructive pulmonary disease) (CMS/HCC)    • Diastolic dysfunction    • Essential hypertension 5/12/2016   • GERD (gastroesophageal reflux disease)    • Heart block    • Hypertension    • Hypertensive heart disease    • Hyperthyroidism    • Kidney stone    • Leukopenia    • Low back pain    • MGUS (monoclonal gammopathy of unknown significance)    • Nephrolithiasis    • Obesity    • Paroxysmal atrial fibrillation (CMS/HCC)    • Peptic ulceration    • Persistent atrial fibrillation (CMS/HCC)    • PSVT (paroxysmal supraventricular tachycardia) (CMS/HCC)    • Pulmonary hypertension (CMS/HCC)    • Rectal bleed    • Sleep apnea    • Systemic hypertension    • Trifascicular block    • 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 CATH INVASIVE LOCATION;  Service: Cardiology   • CHOLECYSTECTOMY     • COLONOSCOPY  06/16/2014    colitis, cryptitis,  tics, NBIH, TA w/low grade dysplasia   • COLONOSCOPY N/A 10/28/2019    Procedure: COLONOSCOPY WITH COLD AND HOT POLYPECTOMIES;  Surgeon: Micaela English MD;  Location: [facility] ENDOSCOPY;  Service: Gastroenterology   • ENDOSCOPY N/A 5/13/2021    Procedure: ESOPHAGOGASTRODUODENOSCOPY with BX;  Surgeon: Stefan Mcfadden MD;  Location: [facility] ENDOSCOPY;  Service: Gastroenterology;  Laterality: N/A;  EPIGASTRIC PAIN  --DUODENAL ULCER, HEMORRHAGIC  GASTRITIS, HIATAL HERNIA    • HEMORRHOIDECTOMY     • HYSTERECTOMY     • JOINT REPLACEMENT     • KNEE ARTHROPLASTY     • MYOMECTOMY     • PACEMAKER IMPLANTATION     • SHOULDER SURGERY     • SINUS SURGERY     • TOE NAIL AMPUTATION  03/04/2019   • TONSILLECTOMY         SLP Recommendation and Plan  Video swallow study performed this date. Mild oropharyngeal dysphagia characterized by x2 instances penetration large bolus thin liquids by cup and straw. All material is ejected from the airway post prandial. Intermittent minimal pharyngeal residue clears with a liquid wash or dry swallow. No additional incidents of penetration, aspiration, or significant oropharyngeal residue across consistencies.     Recommend: regular and thin liquid diet. Medications: whole with thin liquids or in puree as tolerated by pt. Compensations: small bites/sips, single sips, upright for all PO, single sips, alternate liquids/solids, dry swallow as needed.     SLP will follow for dysphagia therapy, carryover of swallow compensations and strategies. If pt is unable to moderate liquid bolus/sip size, or in setting of negative pulmonary or respiratory changes, could consider downgrade to nectar thick liquids - however, all penetrate material was ejected from the airway under limited fluoroscopy trials.         SWALLOW EVALUATION (last 72 hours)      SLP Adult Swallow Evaluation     Row Name 08/07/21 0900       Rehab Evaluation    Document Type  evaluation  -AB    Subjective Information  no complaints  -AB    Patient Observations  alert;cooperative;agree to therapy  -AB    Patient/Family/Caregiver Comments/Observations  Pt seen in fluoroscopy suite, appears lethargic but alerts easily, cooperative throughout   -AB    Care Plan Review  evaluation/treatment results reviewed;care plan/treatment goals reviewed;risks/benefits reviewed;current/potential barriers reviewed;patient/other agree to care plan  -AB    Patient Effort  good  -AB    Symptoms Noted  During/After Treatment  none  -AB       General Information    Patient Profile Reviewed  yes  -AB    Pertinent History Of Current Problem  --    Current Method of Nutrition  nectar/syrup-thick liquids;clear liquids  -AB       Pain    Additional Documentation  Pain Scale: Numbers Pre/Post-Treatment (Group)  -AB       Pain Scale: FACES Pre/Post-Treatment    Pain: FACES Scale, Pretreatment  0-->no hurt  -AB    Posttreatment Pain Rating  0-->no hurt  -AB       MBS/VFSS Interpretation    VFSS Summary  VFSS completed in lateral view with pt positioned at 90 degree angle, self fed for all presentations. Assessment completed with: nectar thick liquids by cup/straw, thin liquids by cup/ straw, puree, mechanical soft solids, and regular solids. Lingual movements slow in initiation, decreased in bolus control. Premature spillage occurs to valleculae with NTL, thin liquids by cup x1, pyriform for mixed consistencies, thin liquids by cup  x1, thin liquids by straw x3. Laryngeal elevation, epiglottic inversion mildly reduced, functional. Base of tongue retraction reduced, contributes to posterior loss of bolus.  Anterior hyoid excursion adequate. Minimal PPW and BOT residuals remain with Puree; diffuse valleculae, BOT, PPW with mixed consistency secondary to decreased pharyngeal squeeze, result of aforementioned deficits. Residue effectively decreased to trace with a cued dry swallow or liquid wash. Transient penetration occurs during the swallow, ejected from the airway with initial trial thin liquids by cup (x1) - to level of vocal folds, and third consecutive trial thin liquids by straw (x1) - above level of vocal folds. This appears direct correlation to bolus size with head in pyriform sinus, ineffective epiglottic inversion resulting in incomplete laryngeal vestibular closure.  No material is visualized in the airway post prandial. No additional instances of penetration across trials. No instances of aspiration across  study.   -AB       SLP Communication to Radiology    Severity Level of Dysphagia  mild dysphagia;oral dysfunction;pharyngeal dysfunction  -AB    Consistencies Aspirated/Penetrated  penetrated;thin liquids  -AB    Summary Statement  VFSS completed in lateral view with pt positioned at 90 degree angle, self fed for all presentations. Assessment completed with: nectar thick liquids by cup/straw, thin liquids by cup/ straw, puree, mechanical soft solids, and regular solids. Lingual movements slow in initiation, decreased in bolus control. Premature spillage occurs to valleculae with NTL, thin liquids by cup x1, pyriform for mixed consistencies, thin liquids by cup  x1, thin liquids by straw x3. Laryngeal elevation, epiglottic inversion mildly reduced, functional. Base of tongue retraction reduced, contributes to posterior loss of bolus.  Anterior hyoid excursion adequate. Minimal PPW and BOT residuals remain with Puree; diffuse valleculae, BOT, PPW with mixed consistency secondary to decreased pharyngeal squeeze, result of aforementioned deficits. Residue effectively decreased to trace with a cued dry swallow or liquid wash. Transient penetration occurs during the swallow, ejected from the airway with initial trial thin liquids by cup (x1) - to level of vocal folds, and third consecutive trial thin liquids by straw (x1) - above level of vocal folds. This appears direct correlation to bolus size with head in pyriform sinus, ineffective epiglottic inversion resulting in incomplete laryngeal vestibular closure.  No material is visualized in the airway post prandial. No additional instances of penetration across trials. No instances of aspiration across study.   -AB       Clinical Impression    Daily Summary of Progress (SLP)  progress toward functional goals is good  -AB    Barriers to Overall Progress (SLP)  none ID  -AB    SLP Swallowing Diagnosis  mild;oral dysphagia;pharyngeal dysphagia  -AB    Functional Impact  risk  of aspiration/pneumonia  -AB    Rehab Potential/Prognosis, Swallowing  good, to achieve stated therapy goals  -AB    Swallow Criteria for Skilled Therapeutic Interventions Met  demonstrates skilled criteria  -AB       Recommendations    Therapy Frequency (Swallow)  PRN  -AB    Predicted Duration Therapy Intervention (Days)  until discharge  -AB    SLP Diet Recommendation  regular textures;thin liquids  -AB    Recommended Diagnostics  -- dysphagia tx  -AB    Recommended Precautions and Strategies  upright posture during/after eating;small bites of food and sips of liquid;multiple swallows per bite of food;multiple swallows per sip of liquid;alternate between small bites of food and sips of liquid  -AB    Oral Care Recommendations  Oral Care BID/PRN  -AB    SLP Rec. for Method of Medication Administration  meds whole;with thin liquids;with pudding or applesauce;as tolerated  -AB    Monitor for Signs of Aspiration  yes;notify SLP if any concerns  -AB    Anticipated Discharge Disposition (SLP)  unknown  -AB       Swallow Goals (SLP)    Oral Nutrition/Hydration Goal Selection (SLP)  oral nutrition/hydration, SLP goal 1  -AB    Pharyngeal Strengthening Exercise Goal Selection (SLP)  pharyngeal strengthening exercise, SLP goal 1  -AB    Swallow Management Recall Goal Selection (SLP)  swallow management recall, SLP goal 1  -AB    Additional Documentation  pharyngeal strengthening exercise goal selection (SLP);swallow management recall goal selection (SLP)  -AB       Oral Nutrition/Hydration Goal 1 (SLP)    Oral Nutrition/Hydration Goal 1, SLP  Pt will chidi PO without overt s/s of aspiration.  -AB    Time Frame (Oral Nutrition/Hydration Goal 1, SLP)  by discharge  -AB       Pharyngeal Strengthening Exercise Goal 1 (SLP)    Activity (Pharyngeal Strengthening Goal 1, SLP)  increase superior movement of the hyolaryngeal complex;increase epiglottic inversion and retroflexion;increase closure at entrance to airway/closure of  airway at glottis  -AB    Increase Superior Movement of the Hyolaryngeal Complex  Mendelsohn;hard effortful swallow  -AB    Increase Epiglottic Inversion and Retroflexion  falsetto;effortful pitch glide (falsetto + pharyngeal squeeze)  -AB    Increase Closure at Entrance to Airway/Closure of Airway at Glottis  supraglottic swallow;super-supraglottic swallow;breath hold exercises  -AB    Frazee/Accuracy (Pharyngeal Strengthening Goal 1, SLP)  with minimal cues (75-90% accuracy)  -AB    Time Frame (Pharyngeal Strengthening Goal 1, SLP)  by discharge  -AB       Swallow Management Recall Goal 1 (SLP)    Activity (Swallow Management Recall Goal 1, SLP)  independent recall of;safe diet/liquid level;safe diet level/texture;compensatory swallow strategies/techniques  -AB    Frazee/Accuracy (Swallow Management Recall Goal 1, SLP)  with minimal cues (75-90% accuracy)  -AB    Time Frame (Swallow Management Recall Goal 1, SLP)  short term goal (STG)  -AB    Barriers (Swallow Management Recall Goal 1, SLP)  VFSS review completed in fluoro suite - pt verbalizes understanding with 80% of compensations. She states decreased appetite limiting overall PO consumption at this time.   -AB    Progress/Outcomes (Swallow Management Recall Goal 1, SLP)  goal partially met  -AB      User Key  (r) = Recorded By, (t) = Taken By, (c) = Cosigned By    Initials Name Effective Dates    Clarissa Prieto, MS CCC-SLP 08/01/21 -           EDUCATION  The patient has been educated in the following areas:   Dysphagia (Swallowing Impairment).       SLP GOALS     Row Name 08/07/21 0900       Oral Nutrition/Hydration Goal 1 (SLP)    Oral Nutrition/Hydration Goal 1, SLP  Pt will chidi PO without overt s/s of aspiration.  -AB    Time Frame (Oral Nutrition/Hydration Goal 1, SLP)  by discharge  -AB       Pharyngeal Strengthening Exercise Goal 1 (SLP)    Activity (Pharyngeal Strengthening Goal 1, SLP)  increase superior movement of the hyolaryngeal  complex;increase epiglottic inversion and retroflexion;increase closure at entrance to airway/closure of airway at glottis  -AB    Increase Superior Movement of the Hyolaryngeal Complex  Mendelsohn;hard effortful swallow  -AB    Increase Epiglottic Inversion and Retroflexion  falsetto;effortful pitch glide (falsetto + pharyngeal squeeze)  -AB    Increase Closure at Entrance to Airway/Closure of Airway at Glottis  supraglottic swallow;super-supraglottic swallow;breath hold exercises  -AB    Eva/Accuracy (Pharyngeal Strengthening Goal 1, SLP)  with minimal cues (75-90% accuracy)  -AB    Time Frame (Pharyngeal Strengthening Goal 1, SLP)  by discharge  -AB       Swallow Management Recall Goal 1 (SLP)    Activity (Swallow Management Recall Goal 1, SLP)  independent recall of;safe diet/liquid level;safe diet level/texture;compensatory swallow strategies/techniques  -AB    Eva/Accuracy (Swallow Management Recall Goal 1, SLP)  with minimal cues (75-90% accuracy)  -AB    Time Frame (Swallow Management Recall Goal 1, SLP)  short term goal (STG)  -AB    Barriers (Swallow Management Recall Goal 1, SLP)  VIDEO FOLLOW UP:     VFSS review completed in fluoro suite - pt verbalizes understanding with 80% of compensations. She states decreased appetite limiting overall PO consumption at this time.      VFSS review and education was conducted this date. Individuals educated included: patient. Education methods/means included verbal review face to face and playback of VFSS.  Education barriers: none identified Further follow up recommended until discharge.     -AB    Progress/Outcomes (Swallow Management Recall Goal 1, SLP)  goal partially met  -AB      User Key  (r) = Recorded By, (t) = Taken By, (c) = Cosigned By    Initials Name Provider Type    Clarissa Prieto MS CCC-SLP Speech and Language Pathologist           SLP Outcome Measures (last 72 hours)      SLP Outcome Measures     Row Name 08/07/21 0900           SLP Outcome Measures    Outcome Measure Used?  Adult NOMS  -AB        Adult FCM Scores    FCM Chosen  Swallowing  -AB     Swallowing FCM Score  6  -AB       User Key  (r) = Recorded By, (t) = Taken By, (c) = Cosigned By    Initials Name Effective Dates    Clarissa Prieto MS CCC-SLP 08/01/21 -            Time Calculation:   Time Calculation- SLP     Row Name 08/07/21 0956             Time Calculation- SLP    SLP Start Time  0845  -AB      SLP Stop Time  1000  -AB      SLP Time Calculation (min)  75 min  -AB      SLP Received On  08/07/21  -AB         Untimed Charges    72670-RH Motion Fluoro Eval Swallow Minutes  75  -AB         Total Minutes    Untimed Charges Total Minutes  75  -AB       Total Minutes  75  -AB        User Key  (r) = Recorded By, (t) = Taken By, (c) = Cosigned By    Initials Name Provider Type    Clarissa Prieto MS CCC-SLP Speech and Language Pathologist          Therapy Charges for Today     Code Description Service Date Service Provider Modifiers Qty    [id number] HC ST MOTION FLUORO EVAL SWALLOW 5 8/7/2021 Clarissa Francis MS CCC-SLP GN 1        PPE:  Patient was wearing a face mask during this therapy encounter. The patient's mask was removed to allow po trials to be administered for purposes of this assessment. The patient's mask was replaced prior to being transported back up to their room. Therapist used appropriate personal protective equipment including mask, eye protection and gloves.  Mask used was standard procedure mask. Appropriate PPE was worn during the entire therapy session. Hand hygiene was completed before and after therapy session. Patient is not in enhanced droplet precautions.              Clarissa Francis MS CCC-SLP  8/7/2021

## 2021-08-07 NOTE — PROGRESS NOTES
[facility]  [phone]  Sixto Hugo MD    Subjective:  LOS: 2    Chief Complaint:  weakness    Awake, alert. Denies focal weakness. Taking some po. Denies soa, n/v/d    Objective   Vital Signs past 24hrs    Temp range: Temp (24hrs), Av.6 °F (36.4 °C), Min:97.1 °F (36.2 °C), Max:98 °F (36.7 °C)    BP range: BP: ()/(53-79) 131/66  Pulse range: Heart Rate:  [69-80] 70  Resp rate range:      Device (Oxygen Therapy): room air   Oxygen range:SpO2:  [93 %-100 %] 96 %      90.8 kg (200 lb 2.8 oz); Body mass index is 34.34 kg/m².    Intake/Output Summary (Last 24 hours) at 2021 0732  Last data filed at 2021 0500  Gross per 24 hour   Intake 297.87 ml   Output 1325 ml   Net -1027.13 ml       Physical Exam  Constitutional:       Appearance: She is obese.   HENT:      Head: Normocephalic.      Mouth/Throat:      Mouth: Mucous membranes are moist.   Eyes:      Pupils: Pupils are equal, round, and reactive to light.   Cardiovascular:      Rate and Rhythm: Normal rate and regular rhythm.      Heart sounds: No murmur heard.        Comments: paced  Pulmonary:      Effort: Pulmonary effort is normal.      Breath sounds: Normal breath sounds.   Abdominal:      General: Bowel sounds are normal.      Palpations: Abdomen is soft. There is no mass.      Tenderness: There is no abdominal tenderness.   Musculoskeletal:      Cervical back: Neck supple.   Neurological:      Mental Status: She is alert.       Results Review:    I have reviewed the laboratory and imaging data since the last note by [facility] physician.  My annotations are noted in assessment and plan.    Medication Review:  I have reviewed the current MAR.  My annotations are noted in assessment and plan.    niCARdipine, 5-15 mg/hr      Plan   PCCM Problems  SDH, subacute and chronic  Cerebral compression with midline shift  Relevant Medical Diagnoses  Afib on AC, reversed  Recent GIB  SSS with pacer  Ex-smoker  COPD  dCHF  MGUS  HUGO      THESE ARE  NEW MEDICAL PROBLEMS TO ME.    Plan of Treatment    Stable CT. BP controlled - not on drips. On Keppra, change to po.    Hx GIB, Hb stable.    Consult Cards outpatient for Afib decision on AC.    Await NS, but hopefully out of ICU today.    Spoke to dtr, not interested in rehab.    Sixto Hugo MD  08/07/21  07:32 EDT    While in the room and during my examination of the patient I wore gloves, gown, mask, eye protection and followed enhanced droplet/contact isolation protocol and precautions.  I washed my hands before and after this patient encounter.    Part of this note may be an electronic transcription/translation of spoken language to printed text using the Dragon Dictation System.

## 2021-08-08 ENCOUNTER — APPOINTMENT (OUTPATIENT)
Dept: CT IMAGING | Facility: HOSPITAL | Age: 86
End: 2021-08-08

## 2021-08-08 ENCOUNTER — ANESTHESIA (OUTPATIENT)
Dept: PERIOP | Facility: HOSPITAL | Age: 86
End: 2021-08-08

## 2021-08-08 ENCOUNTER — ANESTHESIA EVENT (OUTPATIENT)
Dept: PERIOP | Facility: HOSPITAL | Age: 86
End: 2021-08-08

## 2021-08-08 LAB
ANION GAP SERPL CALCULATED.3IONS-SCNC: 8.2 MMOL/L (ref 5–15)
BUN SERPL-MCNC: 7 MG/DL (ref 8–23)
BUN/CREAT SERPL: 13.2 (ref 7–25)
CALCIUM SPEC-SCNC: 9.7 MG/DL (ref 8.6–10.5)
CHLORIDE SERPL-SCNC: 106 MMOL/L (ref 98–107)
CO2 SERPL-SCNC: 24.8 MMOL/L (ref 22–29)
CREAT SERPL-MCNC: 0.53 MG/DL (ref 0.57–1)
DEPRECATED RDW RBC AUTO: 47.3 FL (ref 37–54)
ERYTHROCYTE [DISTWIDTH] IN BLOOD BY AUTOMATED COUNT: 13.4 % (ref 12.3–15.4)
GFR SERPL CREATININE-BSD FRML MDRD: 133 ML/MIN/1.73
GLUCOSE BLDC GLUCOMTR-MCNC: 145 MG/DL (ref 70–130)
GLUCOSE BLDC GLUCOMTR-MCNC: 87 MG/DL (ref 70–130)
GLUCOSE BLDC GLUCOMTR-MCNC: 91 MG/DL (ref 70–130)
GLUCOSE BLDC GLUCOMTR-MCNC: 99 MG/DL (ref 70–130)
GLUCOSE SERPL-MCNC: 82 MG/DL (ref 65–99)
HCT VFR BLD AUTO: 33.8 % (ref 34–46.6)
HGB BLD-MCNC: 11.2 G/DL (ref 12–15.9)
INR PPP: 1.21 (ref 0.9–1.1)
MAGNESIUM SERPL-MCNC: 1.9 MG/DL (ref 1.6–2.4)
MCH RBC QN AUTO: 31.5 PG (ref 26.6–33)
MCHC RBC AUTO-ENTMCNC: 33.1 G/DL (ref 31.5–35.7)
MCV RBC AUTO: 94.9 FL (ref 79–97)
PHOSPHATE SERPL-MCNC: 2.3 MG/DL (ref 2.5–4.5)
PLATELET # BLD AUTO: 225 10*3/MM3 (ref 140–450)
PMV BLD AUTO: 9.2 FL (ref 6–12)
POTASSIUM SERPL-SCNC: 4.2 MMOL/L (ref 3.5–5.2)
PROTHROMBIN TIME: 15.1 SECONDS (ref 11.7–14.2)
RBC # BLD AUTO: 3.56 10*6/MM3 (ref 3.77–5.28)
SODIUM SERPL-SCNC: 139 MMOL/L (ref 136–145)
WBC # BLD AUTO: 4.21 10*3/MM3 (ref 3.4–10.8)

## 2021-08-08 PROCEDURE — 85027 COMPLETE CBC AUTOMATED: CPT | Performed by: INTERNAL MEDICINE

## 2021-08-08 PROCEDURE — C1713 ANCHOR/SCREW BN/BN,TIS/BN: HCPCS | Performed by: NEUROLOGICAL SURGERY

## 2021-08-08 PROCEDURE — 85610 PROTHROMBIN TIME: CPT | Performed by: NURSE PRACTITIONER

## 2021-08-08 PROCEDURE — 80048 BASIC METABOLIC PNL TOTAL CA: CPT | Performed by: INTERNAL MEDICINE

## 2021-08-08 PROCEDURE — 83735 ASSAY OF MAGNESIUM: CPT | Performed by: INTERNAL MEDICINE

## 2021-08-08 PROCEDURE — 25010000003 CEFAZOLIN IN DEXTROSE 2-4 GM/100ML-% SOLUTION: Performed by: NEUROLOGICAL SURGERY

## 2021-08-08 PROCEDURE — C1729 CATH, DRAINAGE: HCPCS | Performed by: NEUROLOGICAL SURGERY

## 2021-08-08 PROCEDURE — 25010000002 DEXAMETHASONE PER 1 MG: Performed by: NURSE ANESTHETIST, CERTIFIED REGISTERED

## 2021-08-08 PROCEDURE — 82962 GLUCOSE BLOOD TEST: CPT

## 2021-08-08 PROCEDURE — 25010000002 NEOSTIGMINE 5 MG/10ML SOLUTION: Performed by: NURSE ANESTHETIST, CERTIFIED REGISTERED

## 2021-08-08 PROCEDURE — 25010000002 MORPHINE PER 10 MG: Performed by: NEUROLOGICAL SURGERY

## 2021-08-08 PROCEDURE — 99232 SBSQ HOSP IP/OBS MODERATE 35: CPT | Performed by: NEUROLOGICAL SURGERY

## 2021-08-08 PROCEDURE — 25010000002 FENTANYL CITRATE (PF) 50 MCG/ML SOLUTION: Performed by: NURSE ANESTHETIST, CERTIFIED REGISTERED

## 2021-08-08 PROCEDURE — 70450 CT HEAD/BRAIN W/O DYE: CPT

## 2021-08-08 PROCEDURE — 25010000002 PROPOFOL 10 MG/ML EMULSION: Performed by: NURSE ANESTHETIST, CERTIFIED REGISTERED

## 2021-08-08 PROCEDURE — 00C43ZZ EXTIRPATION OF MATTER FROM INTRACRANIAL SUBDURAL SPACE, PERCUTANEOUS APPROACH: ICD-10-PCS | Performed by: NEUROLOGICAL SURGERY

## 2021-08-08 PROCEDURE — 84100 ASSAY OF PHOSPHORUS: CPT | Performed by: INTERNAL MEDICINE

## 2021-08-08 PROCEDURE — 25010000002 LEVETIRACETAM IN NACL 0.82% 500 MG/100ML SOLUTION: Performed by: NEUROLOGICAL SURGERY

## 2021-08-08 PROCEDURE — 25010000002 PHENYLEPHRINE PER 1 ML: Performed by: NURSE ANESTHETIST, CERTIFIED REGISTERED

## 2021-08-08 PROCEDURE — 25010000002 ONDANSETRON PER 1 MG: Performed by: NURSE ANESTHETIST, CERTIFIED REGISTERED

## 2021-08-08 PROCEDURE — 25010000003 CEFAZOLIN PER 500 MG: Performed by: NEUROLOGICAL SURGERY

## 2021-08-08 PROCEDURE — 61154 BURR HOLE W/EVAC&/DRG HMTMA: CPT | Performed by: SPECIALIST/TECHNOLOGIST, OTHER

## 2021-08-08 PROCEDURE — 25010000002 CEFAZOLIN PER 500 MG: Performed by: NURSE ANESTHETIST, CERTIFIED REGISTERED

## 2021-08-08 PROCEDURE — 61154 BURR HOLE W/EVAC&/DRG HMTMA: CPT | Performed by: NEUROLOGICAL SURGERY

## 2021-08-08 PROCEDURE — C1889 IMPLANT/INSERT DEVICE, NOC: HCPCS | Performed by: NEUROLOGICAL SURGERY

## 2021-08-08 DEVICE — FLOSEAL HEMOSTATIC MATRIX, 10ML
Type: IMPLANTABLE DEVICE | Site: BRAIN | Status: FUNCTIONAL
Brand: FLOSEAL HEMOSTATIC MATRIX

## 2021-08-08 RX ORDER — HYDROMORPHONE HYDROCHLORIDE 1 MG/ML
0.5 INJECTION, SOLUTION INTRAMUSCULAR; INTRAVENOUS; SUBCUTANEOUS
Status: DISCONTINUED | OUTPATIENT
Start: 2021-08-08 | End: 2021-08-08 | Stop reason: HOSPADM

## 2021-08-08 RX ORDER — NEOSTIGMINE METHYLSULFATE 0.5 MG/ML
INJECTION, SOLUTION INTRAVENOUS AS NEEDED
Status: DISCONTINUED | OUTPATIENT
Start: 2021-08-08 | End: 2021-08-08 | Stop reason: SURG

## 2021-08-08 RX ORDER — MORPHINE SULFATE 2 MG/ML
2 INJECTION, SOLUTION INTRAMUSCULAR; INTRAVENOUS
Status: DISCONTINUED | OUTPATIENT
Start: 2021-08-08 | End: 2021-08-10

## 2021-08-08 RX ORDER — EPHEDRINE SULFATE 50 MG/ML
5 INJECTION, SOLUTION INTRAVENOUS ONCE AS NEEDED
Status: DISCONTINUED | OUTPATIENT
Start: 2021-08-08 | End: 2021-08-08 | Stop reason: HOSPADM

## 2021-08-08 RX ORDER — FENTANYL CITRATE 50 UG/ML
INJECTION, SOLUTION INTRAMUSCULAR; INTRAVENOUS AS NEEDED
Status: DISCONTINUED | OUTPATIENT
Start: 2021-08-08 | End: 2021-08-08 | Stop reason: SURG

## 2021-08-08 RX ORDER — DIPHENHYDRAMINE HCL 25 MG
25 CAPSULE ORAL
Status: DISCONTINUED | OUTPATIENT
Start: 2021-08-08 | End: 2021-08-08 | Stop reason: HOSPADM

## 2021-08-08 RX ORDER — FLUMAZENIL 0.1 MG/ML
0.2 INJECTION INTRAVENOUS AS NEEDED
Status: DISCONTINUED | OUTPATIENT
Start: 2021-08-08 | End: 2021-08-08 | Stop reason: HOSPADM

## 2021-08-08 RX ORDER — SODIUM CHLORIDE, SODIUM LACTATE, POTASSIUM CHLORIDE, CALCIUM CHLORIDE 600; 310; 30; 20 MG/100ML; MG/100ML; MG/100ML; MG/100ML
INJECTION, SOLUTION INTRAVENOUS CONTINUOUS PRN
Status: DISCONTINUED | OUTPATIENT
Start: 2021-08-08 | End: 2021-08-08 | Stop reason: SURG

## 2021-08-08 RX ORDER — OXYCODONE HYDROCHLORIDE AND ACETAMINOPHEN 5; 325 MG/1; MG/1
2 TABLET ORAL EVERY 6 HOURS PRN
Status: DISCONTINUED | OUTPATIENT
Start: 2021-08-08 | End: 2021-08-09

## 2021-08-08 RX ORDER — MAGNESIUM HYDROXIDE 1200 MG/15ML
LIQUID ORAL AS NEEDED
Status: DISCONTINUED | OUTPATIENT
Start: 2021-08-08 | End: 2021-08-08 | Stop reason: HOSPADM

## 2021-08-08 RX ORDER — FENTANYL CITRATE 50 UG/ML
50 INJECTION, SOLUTION INTRAMUSCULAR; INTRAVENOUS
Status: DISCONTINUED | OUTPATIENT
Start: 2021-08-08 | End: 2021-08-08 | Stop reason: HOSPADM

## 2021-08-08 RX ORDER — DEXAMETHASONE SODIUM PHOSPHATE 10 MG/ML
INJECTION INTRAMUSCULAR; INTRAVENOUS AS NEEDED
Status: DISCONTINUED | OUTPATIENT
Start: 2021-08-08 | End: 2021-08-08 | Stop reason: SURG

## 2021-08-08 RX ORDER — PROMETHAZINE HYDROCHLORIDE 25 MG/1
25 SUPPOSITORY RECTAL ONCE AS NEEDED
Status: DISCONTINUED | OUTPATIENT
Start: 2021-08-08 | End: 2021-08-08 | Stop reason: HOSPADM

## 2021-08-08 RX ORDER — HYDRALAZINE HYDROCHLORIDE 20 MG/ML
5 INJECTION INTRAMUSCULAR; INTRAVENOUS
Status: DISCONTINUED | OUTPATIENT
Start: 2021-08-08 | End: 2021-08-08 | Stop reason: HOSPADM

## 2021-08-08 RX ORDER — PROPOFOL 10 MG/ML
VIAL (ML) INTRAVENOUS AS NEEDED
Status: DISCONTINUED | OUTPATIENT
Start: 2021-08-08 | End: 2021-08-08 | Stop reason: SURG

## 2021-08-08 RX ORDER — LABETALOL HYDROCHLORIDE 5 MG/ML
5 INJECTION, SOLUTION INTRAVENOUS
Status: DISCONTINUED | OUTPATIENT
Start: 2021-08-08 | End: 2021-08-08 | Stop reason: HOSPADM

## 2021-08-08 RX ORDER — GLYCOPYRROLATE 0.2 MG/ML
INJECTION INTRAMUSCULAR; INTRAVENOUS AS NEEDED
Status: DISCONTINUED | OUTPATIENT
Start: 2021-08-08 | End: 2021-08-08 | Stop reason: SURG

## 2021-08-08 RX ORDER — ROCURONIUM BROMIDE 10 MG/ML
INJECTION, SOLUTION INTRAVENOUS AS NEEDED
Status: DISCONTINUED | OUTPATIENT
Start: 2021-08-08 | End: 2021-08-08 | Stop reason: SURG

## 2021-08-08 RX ORDER — ONDANSETRON 2 MG/ML
4 INJECTION INTRAMUSCULAR; INTRAVENOUS ONCE AS NEEDED
Status: DISCONTINUED | OUTPATIENT
Start: 2021-08-08 | End: 2021-08-08 | Stop reason: HOSPADM

## 2021-08-08 RX ORDER — LIDOCAINE HYDROCHLORIDE AND EPINEPHRINE 10; 10 MG/ML; UG/ML
INJECTION, SOLUTION INFILTRATION; PERINEURAL AS NEEDED
Status: DISCONTINUED | OUTPATIENT
Start: 2021-08-08 | End: 2021-08-08 | Stop reason: HOSPADM

## 2021-08-08 RX ORDER — LIDOCAINE HYDROCHLORIDE 20 MG/ML
INJECTION, SOLUTION INFILTRATION; PERINEURAL AS NEEDED
Status: DISCONTINUED | OUTPATIENT
Start: 2021-08-08 | End: 2021-08-08 | Stop reason: SURG

## 2021-08-08 RX ORDER — CEFAZOLIN SODIUM 2 G/100ML
2 INJECTION, SOLUTION INTRAVENOUS EVERY 8 HOURS
Status: DISPENSED | OUTPATIENT
Start: 2021-08-08 | End: 2021-08-09

## 2021-08-08 RX ORDER — NALOXONE HCL 0.4 MG/ML
0.2 VIAL (ML) INJECTION AS NEEDED
Status: DISCONTINUED | OUTPATIENT
Start: 2021-08-08 | End: 2021-08-08 | Stop reason: HOSPADM

## 2021-08-08 RX ORDER — PROMETHAZINE HYDROCHLORIDE 25 MG/1
25 TABLET ORAL ONCE AS NEEDED
Status: DISCONTINUED | OUTPATIENT
Start: 2021-08-08 | End: 2021-08-08 | Stop reason: HOSPADM

## 2021-08-08 RX ORDER — ONDANSETRON 2 MG/ML
INJECTION INTRAMUSCULAR; INTRAVENOUS AS NEEDED
Status: DISCONTINUED | OUTPATIENT
Start: 2021-08-08 | End: 2021-08-08 | Stop reason: SURG

## 2021-08-08 RX ORDER — DIPHENHYDRAMINE HYDROCHLORIDE 50 MG/ML
12.5 INJECTION INTRAMUSCULAR; INTRAVENOUS
Status: DISCONTINUED | OUTPATIENT
Start: 2021-08-08 | End: 2021-08-08 | Stop reason: HOSPADM

## 2021-08-08 RX ORDER — ACETAMINOPHEN 325 MG/1
650 TABLET ORAL EVERY 6 HOURS PRN
Status: DISCONTINUED | OUTPATIENT
Start: 2021-08-08 | End: 2021-08-08

## 2021-08-08 RX ORDER — CEFAZOLIN SODIUM 500 MG/2.2ML
INJECTION, POWDER, FOR SOLUTION INTRAMUSCULAR; INTRAVENOUS AS NEEDED
Status: DISCONTINUED | OUTPATIENT
Start: 2021-08-08 | End: 2021-08-08 | Stop reason: SURG

## 2021-08-08 RX ADMIN — DEXAMETHASONE SODIUM PHOSPHATE 10 MG: 10 INJECTION INTRAMUSCULAR; INTRAVENOUS at 14:58

## 2021-08-08 RX ADMIN — PHENYLEPHRINE HYDROCHLORIDE 100 MCG: 10 INJECTION INTRAVENOUS at 15:33

## 2021-08-08 RX ADMIN — ROCURONIUM BROMIDE 15 MG: 50 INJECTION INTRAVENOUS at 14:50

## 2021-08-08 RX ADMIN — MORPHINE SULFATE 2 MG: 2 INJECTION, SOLUTION INTRAMUSCULAR; INTRAVENOUS at 23:06

## 2021-08-08 RX ADMIN — SODIUM CHLORIDE, PRESERVATIVE FREE 10 ML: 5 INJECTION INTRAVENOUS at 08:19

## 2021-08-08 RX ADMIN — FENTANYL CITRATE 25 MCG: 50 INJECTION INTRAMUSCULAR; INTRAVENOUS at 14:44

## 2021-08-08 RX ADMIN — ROCURONIUM BROMIDE 10 MG: 50 INJECTION INTRAVENOUS at 15:24

## 2021-08-08 RX ADMIN — SODIUM CHLORIDE, POTASSIUM CHLORIDE, SODIUM LACTATE AND CALCIUM CHLORIDE: 600; 310; 30; 20 INJECTION, SOLUTION INTRAVENOUS at 14:22

## 2021-08-08 RX ADMIN — LIDOCAINE HYDROCHLORIDE 60 MG: 20 INJECTION, SOLUTION INFILTRATION; PERINEURAL at 14:32

## 2021-08-08 RX ADMIN — FENTANYL CITRATE 25 MCG: 50 INJECTION INTRAMUSCULAR; INTRAVENOUS at 14:30

## 2021-08-08 RX ADMIN — ROCURONIUM BROMIDE 10 MG: 50 INJECTION INTRAVENOUS at 15:26

## 2021-08-08 RX ADMIN — CEFAZOLIN 2 G: 225 INJECTION, POWDER, FOR SOLUTION INTRAMUSCULAR; INTRAVENOUS at 14:49

## 2021-08-08 RX ADMIN — LEVETIRACETAM 500 MG: 5 INJECTION INTRAVENOUS at 03:32

## 2021-08-08 RX ADMIN — PROPOFOL 140 MG: 10 INJECTION, EMULSION INTRAVENOUS at 14:32

## 2021-08-08 RX ADMIN — SODIUM CHLORIDE, POTASSIUM CHLORIDE, SODIUM LACTATE AND CALCIUM CHLORIDE: 600; 310; 30; 20 INJECTION, SOLUTION INTRAVENOUS at 14:23

## 2021-08-08 RX ADMIN — NEOSTIGMINE METHYLSULFATE 3 MG: 0.5 INJECTION INTRAVENOUS at 16:04

## 2021-08-08 RX ADMIN — ROCURONIUM BROMIDE 35 MG: 50 INJECTION INTRAVENOUS at 14:32

## 2021-08-08 RX ADMIN — PHENYLEPHRINE HYDROCHLORIDE 100 MCG: 10 INJECTION INTRAVENOUS at 15:30

## 2021-08-08 RX ADMIN — ONDANSETRON 4 MG: 2 INJECTION INTRAMUSCULAR; INTRAVENOUS at 15:24

## 2021-08-08 RX ADMIN — LEVETIRACETAM 500 MG: 5 INJECTION INTRAVENOUS at 19:57

## 2021-08-08 RX ADMIN — GLYCOPYRROLATE 0.5 MG: 0.2 INJECTION INTRAMUSCULAR; INTRAVENOUS at 16:04

## 2021-08-08 RX ADMIN — PHENYLEPHRINE HYDROCHLORIDE 100 MCG: 10 INJECTION INTRAVENOUS at 15:14

## 2021-08-08 RX ADMIN — CEFAZOLIN SODIUM 2 G: 2 INJECTION, SOLUTION INTRAVENOUS at 21:55

## 2021-08-08 RX ADMIN — SODIUM PHOSPHATE, MONOBASIC, MONOHYDRATE 20 MMOL: 276; 142 INJECTION, SOLUTION INTRAVENOUS at 10:09

## 2021-08-08 RX ADMIN — SODIUM CHLORIDE, PRESERVATIVE FREE 10 ML: 5 INJECTION INTRAVENOUS at 20:09

## 2021-08-08 NOTE — ANESTHESIA PREPROCEDURE EVALUATION
Anesthesia Evaluation     Patient summary reviewed and Nursing notes reviewed                Airway   Mallampati: II  TM distance: >3 FB  Neck ROM: full  No difficulty expected  Dental    (+) upper dentures    Pulmonary - normal exam   (+) COPD, asthma,sleep apnea,   Cardiovascular - normal exam    (+) hypertension, dysrhythmias Atrial Fib,       Neuro/Psych  GI/Hepatic/Renal/Endo    (+) obesity, morbid obesity, GERD, PUD, GI bleeding , renal disease stones, thyroid problem     Musculoskeletal     Abdominal    Substance History      OB/GYN          Other                          Anesthesia Plan    ASA 4 - emergent     general     intravenous induction     Anesthetic plan, all risks, benefits, and alternatives have been provided, discussed and informed consent has been obtained with: patient.

## 2021-08-08 NOTE — PLAN OF CARE
Goal Outcome Evaluation:  Plan of Care Reviewed With: patient        Progress: no change  Outcome Summary: Alert and orientedx4. NAD. NIH 1. VSS. Afebrile. No seizure activity noted. Daughter at bedside. Will CTM.

## 2021-08-08 NOTE — ANESTHESIA POSTPROCEDURE EVALUATION
"Patient: Brittany Villeda    Procedure Summary     Date: 08/08/21 Room / Location: [facility] OR [address] [place] [address] [facility] MAIN OR    Anesthesia Start: 1422 Anesthesia Stop: 1622    Procedure: Left-sided dusty holes for evacuation of subdural hematoma (Left Head) Diagnosis:       SDH (subdural hematoma) (CMS/HCC)      (SDH (subdural hematoma) (CMS/HCC) [S06.5X9A])    Surgeons: Gustavo Callaway MD Provider: Jp Parkinson MD    Anesthesia Type: general ASA Status: 4 - Emergent          Anesthesia Type: general    Vitals  Vitals Value Taken Time   /71 08/08/21 1801   Temp 36.5 °C (97.7 °F) 08/08/21 1758   Pulse 70 08/08/21 1802   Resp 20 08/08/21 1758   SpO2 98 % 08/08/21 1802   Vitals shown include unvalidated device data.        Post Anesthesia Care and Evaluation    Patient location during evaluation: bedside  Patient participation: complete - patient participated  Level of consciousness: awake and alert  Pain management: adequate  Airway patency: patent  Anesthetic complications: No anesthetic complications    Cardiovascular status: acceptable  Respiratory status: acceptable  Hydration status: acceptable    Comments: /71   Pulse 70   Temp 36.5 °C (97.7 °F) (Oral)   Resp 20   Ht 162.6 cm (64.02\")   Wt 90.8 kg (200 lb 2.8 oz)   LMP  (LMP Unknown)   SpO2 97%   BMI 34.34 kg/m²           "

## 2021-08-08 NOTE — PROGRESS NOTES
Neurosurgery progress note      Chief complaint: bilateral subdural hematomas      Subjective: No events reported overnight.  Still complains of mild to moderate headaches.  Continues to have lower extremity weakness.  Has not yet been out of bed to ambulate      Objective:  Vitals:    08/08/21 1100 08/08/21 1130 08/08/21 1145 08/08/21 1215   BP: 107/60      Pulse: 70 72 70 72   Resp:       Temp:       TempSrc:       SpO2: 94% 95% 96% 97%   Weight:       Height:           Physical exam  Awake, alert, oriented x3  Pupils equal round reactive to light  Extraocular muscles intact  Face symmetric  Speech is fluent and clear  No pronator drift  Motor exam  Bilateral deltoids 5/5, bilateral biceps 5/5, bilateral triceps 5/5, bilateral wrist extension 5/5 bilateral hand  5/5  Bilateral lower extremity strength is difficult to assess due to poor effort.  She does have diffuse weakness and with sustained effort greater than antigravity strength throughout the bilateral hip flexors, knee extension, DF/PF  Gait deferred  Able to detect  light touch in all 4 extremities      Assessment/plan  86-year-old female with bilateral subacute to chronic subdural hematomas and history of Coumadin use  -She appears much more alert this morning however continues to complain of mild to moderate headaches.  Continues to have lower extremity weakness.  -I talked again to the patient and her family I believe she is symptomatic from the subdural hematomas.  Specifically I believe the left sided subdural hematoma is contributing to most of her symptoms.  After carefully reviewing the CT head from yesterday there is several millimeters of midline shift at the level of the septum pellucidum from left to right and some sulcal effacement over the left cortex.  The right-sided subdural is much smaller at this time measuring 3 to 4 mm.  They have agreed to let me proceed tomorrow with bur holes versus a craniotomy for evacuation of the subdural  hematoma.  -We will plan to keep her n.p.o. past midnight  -I will plan to repeat the CT head today and monitor for any changes.  -Continue to maintain systolic blood pressure less than 150 mmHg

## 2021-08-08 NOTE — ANESTHESIA PROCEDURE NOTES
Airway  Urgency: elective    Date/Time: 8/8/2021 2:35 PM  Airway not difficult    General Information and Staff    Patient location during procedure: OR  CRNA: Britni Gay CRNA    Indications and Patient Condition  Indications for airway management: airway protection    Preoxygenated: yes  MILS maintained throughout  Mask difficulty assessment: 2 - vent by mask + OA or adjuvant +/- NMBA    Final Airway Details  Final airway type: endotracheal airway      Successful airway: ETT  Cuffed: yes   Successful intubation technique: direct laryngoscopy  Facilitating devices/methods: anterior pressure/BURP and intubating stylet  Endotracheal tube insertion site: oral  Blade: Matthews  Blade size: 2  ETT size (mm): 7.0  Cormack-Lehane Classification: grade I - full view of glottis  Placement verified by: chest auscultation   Cuff volume (mL): 8  Measured from: lips  ETT/EBT  to lips (cm): 21  Number of attempts at approach: 1  Assessment: lips, teeth, and gum same as pre-op and atraumatic intubation    Additional Comments  Pt preoxygenated, SIVI, bag mask vent, ATETI, dentition as before

## 2021-08-08 NOTE — OP NOTE
Preoperative diagnosis: Traumatic bilateral subacute on chronic subdural hematomas.  Left greater than right with several millimeters of midline shift at the septum pellucidum    Postoperative diagnosis: Same    Procedure performed: Left-sided bur holes for evacuation of left-sided subacute on chronic subdural hematoma     Surgeon: Stephen Callaway    Assistant: Brooke Wilkerson CFA who was instrumental in helping with hemostasis, visualization of neural structures, and retraction of neural structures.  Her skilled assistance was necessary for the success of this case    Indications for the procedure: Patient was an 86-year-old female recently admitted to the hospital with confusion, headaches, delayed speech, difficulty walking.  A CT head identified left greater than right subacute on chronic subdural hematomas.  She was on Coumadin.  Her INR was corrected.  This afternoon I was called to her bedside as there was a sudden decline in her neurologic status as she became more lethargic and developed a severe headache.  Repeat CT head did not show any significant expansion of the subdural hematoma however there was early uncal herniation as well as significant mass-effect on the left convexity.  The patient and her daughter were consented for the procedure.  The risks and benefits of the procedure were discussed with the patient and her family including the risk of hemorrhage, infection, paralysis.  They expressed understanding of the risks and benefits of the procedure and elected to proceed.      Description: Patient was brought into the operating room.  A timeout was performed.  She was put to sleep with general endotracheal anesthesia.  Her scalp was prepped and draped in the usual sterile fashion.  2 separate 2.5 cm incisions were planned on the left side of her head.  The region was then infiltrated with 1% lidocaine with epinephrine.  An incision was performed with a 10 blade knife.  All 4 areas were opened and  retracted with self-retaining retractors.  Hemostasis was obtained using bipolar electrocautery.  A acorn drill bit was used with a high-speed electric drill to make 2 small dusty holes.  The dura was then coagulated using bipolar forceps and then opened sharply with an 11 blade knife.  This opening was then widened using bipolar electrocautery.  There was evidence of some membranes underneath the opening which were then further opened using electrocautery.  Brisk dark fluid was evacuated under pressure. 4 L of saline irrigation was then used to irrigate until the fluid came back clear.  The EVD catheter was then placed without the stylette into the subdural space and tunneled out of the scalp and connected to a drainage bag.  The galea was then closed with 2-0 Vicryl sutures and the skin closed with 3-0 Monocryl.  The wounds were also irrigated with antibiotic solution.  The drain was sutured in place with 2-0 nylon sutures.  The patient was then extubated and transferred to recovery in stable condition.    Complications: None  Estimated blood loss: Less than 50 cc  Disposition: Return to ICU

## 2021-08-08 NOTE — PLAN OF CARE
Goal Outcome Evaluation:      Pt. VSS. Drowsy and oriented in bed. Pt. Has no pain or headache at this time. Pt. Able to move both upper extremities. Bilateral leg weakness still present.  Subdural drain in place. Drain bag several inches below patient.

## 2021-08-08 NOTE — PROGRESS NOTES
[facility]  [phone]  Sixto Hugo MD    Subjective:  LOS: 3    Chief Complaint:  weakness    Reports increased headache.  Also he does not feel well and perhaps somewhat nauseous.  No new focal weakness described.  Patient was ambulatory at home.    Objective   Vital Signs past 24hrs    Temp range: Temp (24hrs), Av.1 °F (36.7 °C), Min:97.9 °F (36.6 °C), Max:98.2 °F (36.8 °C)    BP range: BP: (105-128)/(49-67) 107/60  Pulse range: Heart Rate:  [68-78] 72  Resp rate range:      Device (Oxygen Therapy): room air   Oxygen range:SpO2:  [92 %-99 %] 97 %      90.8 kg (200 lb 2.8 oz); Body mass index is 34.34 kg/m².    Intake/Output Summary (Last 24 hours) at 2021 1322  Last data filed at 2021 1800  Gross per 24 hour   Intake 203 ml   Output 500 ml   Net -297 ml       Physical Exam  Constitutional:       Appearance: She is obese.   HENT:      Head: Normocephalic.      Mouth/Throat:      Mouth: Mucous membranes are moist.   Eyes:      Pupils: Pupils are equal, round, and reactive to light.   Cardiovascular:      Rate and Rhythm: Normal rate and regular rhythm.      Heart sounds: No murmur heard.        Comments: paced  Pulmonary:      Effort: Pulmonary effort is normal.      Breath sounds: Normal breath sounds.   Abdominal:      General: Bowel sounds are normal.      Palpations: Abdomen is soft. There is no mass.      Tenderness: There is no abdominal tenderness.   Musculoskeletal:      Cervical back: Neck supple.   Neurological:      Mental Status: She is alert.       Results Review:    I have reviewed the laboratory and imaging data since the last note by [facility] physician.  My annotations are noted in assessment and plan.    Medication Review:  I have reviewed the current MAR.  My annotations are noted in assessment and plan.    niCARdipine, 5-15 mg/hr      Plan   PCCM Problems  SDH, subacute and chronic  Cerebral compression with midline shift  Relevant Medical Diagnoses  Afib on AC,  reversed  Recent GIB  SSS with pacer  Ex-smoker  COPD  dCHF  MGUS  HUGO        Plan of Treatment    Note neurosurgery input.  Patient will require decompression with bur hole.  Repeat CT was ordered for worsening headache.  May need to go for bur hole placement today instead of tomorrow.    BP controlled - not on drips. On Keppra.    Hx GIB, Hb stable.    Consult Cards outpatient for Afib decision on AC.    Spoke to dtr at bedside.     I spoke to neurosurgery again just now.  Patient will be going for bur hole placement now.    Sixto Hugo MD  08/08/21  13:22 EDT    While in the room and during my examination of the patient I wore gloves, gown, mask, eye protection and followed enhanced droplet/contact isolation protocol and precautions.  I washed my hands before and after this patient encounter.    Part of this note may be an electronic transcription/translation of spoken language to printed text using the Dragon Dictation System.

## 2021-08-09 ENCOUNTER — APPOINTMENT (OUTPATIENT)
Dept: CT IMAGING | Facility: HOSPITAL | Age: 86
End: 2021-08-09

## 2021-08-09 LAB
ALBUMIN SERPL-MCNC: 3.1 G/DL (ref 3.5–5.2)
ANION GAP SERPL CALCULATED.3IONS-SCNC: 10.2 MMOL/L (ref 5–15)
BUN SERPL-MCNC: 9 MG/DL (ref 8–23)
BUN/CREAT SERPL: 14.1 (ref 7–25)
CALCIUM SPEC-SCNC: 9.7 MG/DL (ref 8.6–10.5)
CHLORIDE SERPL-SCNC: 104 MMOL/L (ref 98–107)
CO2 SERPL-SCNC: 23.8 MMOL/L (ref 22–29)
CREAT SERPL-MCNC: 0.64 MG/DL (ref 0.57–1)
DEPRECATED RDW RBC AUTO: 45.6 FL (ref 37–54)
ERYTHROCYTE [DISTWIDTH] IN BLOOD BY AUTOMATED COUNT: 13.3 % (ref 12.3–15.4)
GFR SERPL CREATININE-BSD FRML MDRD: 107 ML/MIN/1.73
GLUCOSE SERPL-MCNC: 134 MG/DL (ref 65–99)
HCT VFR BLD AUTO: 34.1 % (ref 34–46.6)
HGB BLD-MCNC: 11.6 G/DL (ref 12–15.9)
INR PPP: 1.25 (ref 0.9–1.1)
MCH RBC QN AUTO: 32 PG (ref 26.6–33)
MCHC RBC AUTO-ENTMCNC: 34 G/DL (ref 31.5–35.7)
MCV RBC AUTO: 93.9 FL (ref 79–97)
PHOSPHATE SERPL-MCNC: 3.4 MG/DL (ref 2.5–4.5)
PLATELET # BLD AUTO: 239 10*3/MM3 (ref 140–450)
PMV BLD AUTO: 9.5 FL (ref 6–12)
POTASSIUM SERPL-SCNC: 5 MMOL/L (ref 3.5–5.2)
PROTHROMBIN TIME: 15.5 SECONDS (ref 11.7–14.2)
RBC # BLD AUTO: 3.63 10*6/MM3 (ref 3.77–5.28)
SODIUM SERPL-SCNC: 138 MMOL/L (ref 136–145)
WBC # BLD AUTO: 4.87 10*3/MM3 (ref 3.4–10.8)

## 2021-08-09 PROCEDURE — 85027 COMPLETE CBC AUTOMATED: CPT | Performed by: NEUROLOGICAL SURGERY

## 2021-08-09 PROCEDURE — 25010000002 LEVETIRACETAM IN NACL 0.82% 500 MG/100ML SOLUTION: Performed by: NEUROLOGICAL SURGERY

## 2021-08-09 PROCEDURE — 70450 CT HEAD/BRAIN W/O DYE: CPT

## 2021-08-09 PROCEDURE — 80069 RENAL FUNCTION PANEL: CPT | Performed by: NEUROLOGICAL SURGERY

## 2021-08-09 PROCEDURE — 99024 POSTOP FOLLOW-UP VISIT: CPT | Performed by: NURSE PRACTITIONER

## 2021-08-09 PROCEDURE — 25010000003 CEFAZOLIN IN DEXTROSE 2-4 GM/100ML-% SOLUTION: Performed by: NEUROLOGICAL SURGERY

## 2021-08-09 PROCEDURE — 97530 THERAPEUTIC ACTIVITIES: CPT

## 2021-08-09 PROCEDURE — 25010000002 MORPHINE PER 10 MG: Performed by: NEUROLOGICAL SURGERY

## 2021-08-09 PROCEDURE — 85610 PROTHROMBIN TIME: CPT | Performed by: NEUROLOGICAL SURGERY

## 2021-08-09 PROCEDURE — 97166 OT EVAL MOD COMPLEX 45 MIN: CPT

## 2021-08-09 RX ORDER — LEVETIRACETAM 500 MG/1
500 TABLET ORAL 2 TIMES DAILY
Status: DISCONTINUED | OUTPATIENT
Start: 2021-08-09 | End: 2021-08-14 | Stop reason: HOSPADM

## 2021-08-09 RX ORDER — HYDROCODONE BITARTRATE AND ACETAMINOPHEN 10; 325 MG/1; MG/1
1 TABLET ORAL EVERY 6 HOURS PRN
Status: DISCONTINUED | OUTPATIENT
Start: 2021-08-09 | End: 2021-08-14 | Stop reason: HOSPADM

## 2021-08-09 RX ADMIN — CEFAZOLIN SODIUM 2 G: 2 INJECTION, SOLUTION INTRAVENOUS at 05:43

## 2021-08-09 RX ADMIN — LEVETIRACETAM 500 MG: 5 INJECTION INTRAVENOUS at 04:07

## 2021-08-09 RX ADMIN — MORPHINE SULFATE 2 MG: 2 INJECTION, SOLUTION INTRAMUSCULAR; INTRAVENOUS at 15:45

## 2021-08-09 RX ADMIN — LEVETIRACETAM 500 MG: 500 TABLET, FILM COATED ORAL at 20:46

## 2021-08-09 RX ADMIN — SODIUM CHLORIDE, PRESERVATIVE FREE 10 ML: 5 INJECTION INTRAVENOUS at 20:47

## 2021-08-09 RX ADMIN — MAGNESIUM HYDROXIDE 30 ML: 2400 SUSPENSION ORAL at 10:52

## 2021-08-09 RX ADMIN — SODIUM CHLORIDE, PRESERVATIVE FREE 10 ML: 5 INJECTION INTRAVENOUS at 08:56

## 2021-08-09 NOTE — THERAPY EVALUATION
Patient Name: Brittany Villeda  : 1935    MRN: [id number]                              Today's Date: 2021       Admit Date: 2021    Visit Dx:     ICD-10-CM ICD-9-CM   1. Subdural hematoma (CMS/HCC)  S06.5X9A 432.1   2. SAH (subarachnoid hemorrhage) (CMS/HCC)  I60.9 430   3. 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)   • Other chest pain   • Lichen sclerosus   • Heart block   • Sleep-related hypoxia   • Bradycardia   • Shoulder arthritis   • History of atrial fibrillation   • Pacemaker   • Paroxysmal SVT (supraventricular tachycardia) (CMS/HCC)   • History of chest pain   • Palpitations   • Atrial fibrillation (CMS/HCC)   • Rectal bleeding   • Arthritis   • Ascending aortic aneurysm (CMS/HCC)   • Mediastinal lymphadenopathy   • Immobility   • Left knee pain   • Chronic anticoagulation   • Hemarthrosis of left knee   • Anemia   • Obesity (BMI 30-39.9)   • Generalized weakness   • Dysautonomia (CMS/HCC)   • Weakness of both lower extremities   • Macrocytosis   • Hypercalcemia   • Arthritis of right wrist   • Hyperparathyroidism (CMS/HCC)   • Choledocholithiasis   • Essential hypertension   • Hyperglycemia   • Epigastric pain   • Duodenal ulcer   • Hemorrhagic gastritis   • Exertional dyspnea   • COPD (chronic obstructive pulmonary disease) (CMS/HCC)   • Functional quadriplegia (CMS/HCC)   • Hip pain   • Osteoarthritis of glenohumeral joint   • Other malaise and fatigue   • Shoulder pain   • ICH  (intracerebral hemorrhage) (CMS/HCC)   • Subdural hematoma (CMS/HCC)     Past Medical History:   Diagnosis Date   • Acute UTI (urinary tract infection) 4/8/2021   • Anemia    • Arrhythmia    • Arthritis    • Asthma    • Bradycardia    • Chest pain    • Choledocholithiasis 5/9/2021   • Colitis    • COPD (chronic obstructive pulmonary disease) (CMS/HCC)    • Diastolic dysfunction    • Essential hypertension 5/12/2016   • GERD (gastroesophageal reflux disease)    • Heart block    • Hypertension    • Hypertensive heart disease    • Hyperthyroidism    • Kidney stone    • Leukopenia    • Low back pain    • MGUS (monoclonal gammopathy of unknown significance)    • Nephrolithiasis    • Obesity    • Paroxysmal atrial fibrillation (CMS/HCC)    • Peptic ulceration    • Persistent atrial fibrillation (CMS/HCC)    • PSVT (paroxysmal supraventricular tachycardia) (CMS/HCC)    • Pulmonary hypertension (CMS/HCC)    • Rectal bleed    • Sleep apnea    • Systemic hypertension    • Trifascicular block    • 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)    • Ventricular tachycardia (CMS/HCC)     nonsustained   • Vertigo      Past Surgical History:   Procedure Laterality Date   • BACK SURGERY      lumbar fusion   • DUSTY HOLE Left 8/8/2021    Procedure: Left-sided dusty holes for evacuation of subdural hematoma;  Surgeon: Gustavo Callaway MD;  Location: [facility] MAIN OR;  Service: Neurosurgery;  Laterality: Left;   • CARDIAC ELECTROPHYSIOLOGY PROCEDURE N/A 11/7/2018    Procedure: Pacemaker DC new   [place];  Surgeon: Jesus Granda MD;  Location: [facility] CATH INVASIVE LOCATION;  Service: Cardiology   • CHOLECYSTECTOMY     • COLONOSCOPY  06/16/2014    colitis, cryptitis,  tics, NBIH, TA w/low grade dysplasia   • COLONOSCOPY N/A 10/28/2019    Procedure: COLONOSCOPY WITH COLD AND HOT POLYPECTOMIES;  Surgeon: Micaela English MD;  Location: [facility] ENDOSCOPY;  Service: Gastroenterology   • ENDOSCOPY N/A 5/13/2021     Procedure: ESOPHAGOGASTRODUODENOSCOPY with BX;  Surgeon: Stefan Mcfadedn MD;  Location: [facility] ENDOSCOPY;  Service: Gastroenterology;  Laterality: N/A;  EPIGASTRIC PAIN  --DUODENAL ULCER, HEMORRHAGIC GASTRITIS, HIATAL HERNIA    • HEMORRHOIDECTOMY     • HYSTERECTOMY     • JOINT REPLACEMENT     • KNEE ARTHROPLASTY     • MYOMECTOMY     • PACEMAKER IMPLANTATION     • SHOULDER SURGERY     • SINUS SURGERY     • TOE NAIL AMPUTATION  03/04/2019   • TONSILLECTOMY       General Information     Row Name 08/09/21 1242          OT Time and Intention    Document Type  (S) evaluation;therapy note (daily note) approached by RN after ICU rounds that Dr Hugo now wants therapy to sit pt EOB today even with drain in place.  Pt not with EVD drain and no restrictions other than keeping below level of head.  RN present while pt sitting EOB and assist OT PRN  -LE     Mode of Treatment  individual therapy;occupational therapy  -LE     Row Name 08/09/21 1242          General Information    Patient Profile Reviewed  yes  -LE     Prior Level of Function  independent:;mod assist: family walks alongside pt and assist as needed with ADL. pt does sponge bath at baseline.  -LE     Existing Precautions/Restrictions  fall  -LE     Row Name 08/09/21 1242          Living Environment    Lives With  alone family with pt 24/7.  -LE     Row Name 08/09/21 1242          Cognition    Orientation Status (Cognition)  oriented to;person;place;time;situation  -LE     Row Name 08/09/21 1242          Safety Issues, Functional Mobility    Impairments Affecting Function (Mobility)  balance;endurance/activity tolerance;postural/trunk control;range of motion (ROM)  -LIEN     Comment, Safety Issues/Impairments (Mobility)  non skid socks.  -LE       User Key  (r) = Recorded By, (t) = Taken By, (c) = Cosigned By    Initials Name Provider Type    Gricelda Mcdonald OTR Occupational Therapist          Mobility/ADL's     Row Name 08/09/21 1248          Bed Mobility    Bed  Mobility  scooting/bridging  -     Scooting/Bridging [place] (Bed Mobility)  dependent (less than 25% patient effort);2 person assist;verbal cues  -LE     Supine-Sit [place] (Bed Mobility)  maximum assist (25% patient effort);verbal cues  -LE     Sit-Supine [place] (Bed Mobility)  verbal cues;maximum assist (25% patient effort)  -LE     Bed Mobility, Safety Issues  decreased use of arms for pushing/pulling;decreased use of legs for bridging/pushing;impaired trunk control for bed mobility  -     Assistive Device (Bed Mobility)  bed rails;head of bed elevated;draw sheet  -[facility] Name 08/09/21 1248          Transfers    Comment (Transfers)  defer, first time sit since surgery 8/8  -[facility] Name 08/09/21 1248          Activities of Daily Living    BADL Assessment/Intervention  toileting;feeding;grooming;lower body dressing;bathing  -[facility] Name 08/09/21 1248          Toileting Assessment/Training    Comment (Toileting)  assist at bed level anticipated.  -[facility] Name 08/09/21 1248          Lower Body Dressing Assessment/Training    [place] Level (Lower Body Dressing)  dependent (less than 25% patient effort)  -LE     Comment (Lower Body Dressing)  to adjust socks before sitting EOB.  -[facility] Name 08/09/21 1248          Bathing Assessment/Intervention    [place] Level (Bathing)  dependent (less than 25% patient effort)  -LE     Position (Bathing)  edge of bed sitting;supine  -LE     Comment (Bathing)  RN giving pt a sponge bath when sitting EOB and then supine.  -LIEN       User Key  (r) = Recorded By, (t) = Taken By, (c) = Cosigned By    Initials Name Provider Type    Gricelda Mcdonald OTR Occupational Therapist        Obj/Interventions     Row Name 08/09/21 1251          Range of Motion Comprehensive    Comment, General Range of Motion  h/o limited B shld especially R shld with rotator cuff injury.  pt raises R UE by holding in L hand.  pt unsure if new  limitations.  over L  shld 1/4, R shld 1/5, elbow 2/3.  MMT at elbow and grasp weak but no focal weakness.  -     Row Name 08/09/21 1251          Shoulder (Therapeutic Exercise)    Shoulder (Therapeutic Exercise)  AROM (active range of motion);AAROM (active assistive range of motion)  -LE     Shoulder AAROM (Therapeutic Exercise)  bilateral;flexion;extension;10 repetitions before moving to EOB.  -     Row Name 08/09/21 1251          Balance    Balance Assessment  sitting static balance  -LE     Static Sitting Balance  mild impairment;WFL initial min A to sit EOB and then once scooted to edge of bed pt close SBA for several minutes.  -     Row Name 08/09/21 1251          Therapeutic Exercise    Therapeutic Exercise  shoulder  -LE       User Key  (r) = Recorded By, (t) = Taken By, (c) = Cosigned By    Initials Name Provider Type    Gricelda Mcdonald OTR Occupational Therapist        Goals/Plan     Row Name 08/09/21 1257          Transfer Goal 1 (OT)    Activity/Assistive Device (Transfer Goal 1, OT)  sit-to-stand/stand-to-sit;bed-to-chair/chair-to-bed;toilet;commode, 3-in-1;walker, rolling  -LE     [place] Level/Cues Needed (Transfer Goal 1, OT)  maximum assist (25-49% patient effort) of 2 person as needed.  -LE     Time Frame (Transfer Goal 1, OT)  2 weeks  -LE     Progress/Outcome (Transfer Goal 1, OT)  goal ongoing  -     Row Name 08/09/21 1257          Dressing Goal 1 (OT)    Activity/Device (Dressing Goal 1, OT)  upper body dressing  -LE     [place]/Cues Needed (Dressing Goal 1, OT)  set-up required;verbal cues required  -LE     Time Frame (Dressing Goal 1, OT)  2 weeks  -LE     Progress/Outcome (Dressing Goal 1, OT)  goal ongoing  -     Row Name 08/09/21 1257          Toileting Goal 1 (OT)    Activity/Device (Toileting Goal 1, OT)  adjust/manage clothing;perform perineal hygiene;commode, 3-in-1  -LE     [place] Level/Cues Needed (Toileting Goal 1, OT)  minimum assist (75% or more patient effort);verbal cues  required  -LE     Time Frame (Toileting Goal 1, OT)  2 weeks  -LE     Progress/Outcome (Toileting Goal 1, OT)  goal ongoing  -LE     Row Name 08/09/21 1257          Grooming Goal 1 (OT)    Activity/Device (Grooming Goal 1, OT)  oral care;wash face, hands sitting EOB  -LE     [place] (Grooming Goal 1, OT)  set-up required;standby assist  -LE     Time Frame (Grooming Goal 1, OT)  2 weeks  -LE     Progress/Outcome (Grooming Goal 1, OT)  goal ongoing  -LE     Row Name 08/09/21 1257          ROM Goal 1 (OT)    ROM Goal 1 (OT)  Hands on assist for B UE ROM ex to increase functional reach and hold during ADL tasks.  -LE     Time Frame (ROM Goal 1, OT)  2 weeks  -LE     Progress/Outcome (ROM Goal 1, OT)  goal ongoing  -LE     Row Name 08/09/21 1257          Therapy Assessment/Plan (OT)    Planned Therapy Interventions (OT)  activity tolerance training;adaptive equipment training;BADL retraining;occupation/activity based interventions;patient/caregiver education/training;transfer/mobility retraining;functional balance retraining;strengthening exercise;passive ROM/stretching;ROM/therapeutic exercise  -LE       User Key  (r) = Recorded By, (t) = Taken By, (c) = Cosigned By    Initials Name Provider Type    Gricelda Mcdonald OTR Occupational Therapist        Clinical Impression     Row Name 08/09/21 1243          Pain Scale: FACES Pre/Post-Treatment    Pain: FACES Scale, Pretreatment  0-->no hurt  -[facility] Name 08/09/21 1243          Plan of Care Review    Plan of Care Reviewed With  patient;daughter  -LE     Outcome Summary  Pt admit from home with headache, slurred speech, LE weakness.  CT (+) for SDH and s/p dusty hole on 8/8/21.   Pt seen by OT and is assist of 1 to move to sit EOB.  Pt tolerates well.  Demo limited B Shld AROM and difficult to determine if new limitations.   Pt may benefit from skilled OT in acute care to increase safety, independence and UE function.  Anticipate rehab at d/c.  -[facility] Name  08/09/21 1243          Therapy Assessment/Plan (OT)    Patient/Family Therapy Goal Statement (OT)  return to prior level of function.  -LE     Rehab Potential (OT)  good, to achieve stated therapy goals  -LE     Criteria for Skilled Therapeutic Interventions Met (OT)  meets criteria;yes  -LE     Therapy Frequency (OT)  5 times/wk  -LE     Row Name 08/09/21 1243          Therapy Plan Review/Discharge Plan (OT)    Equipment Needs Upon Discharge (OT)  -- owns walker.  rec. BSC when able to xfer.  -LE     Anticipated Discharge Disposition (OT)  skilled nursing facility;inpatient rehabilitation facility pending progress  -LE     Row Name 08/09/21 1243          Vital Signs    Pre Systolic BP Rehab  115  -LE     Pre Treatment Diastolic BP  58 taken supine prior to gettign up  -LE     Intra Systolic BP Rehab  116  -LE     Intra Treatment Diastolic BP  63 after sitting EOB.  -LE     Posttreatment Heart Rate (beats/min)  80  -LE     Posttreatment Resp Rate (breaths/min)  334  -LE     Pre SpO2 (%)  96  -LE     O2 Delivery Pre Treatment  room air  -LE     O2 Delivery Intra Treatment  room air  -LE     O2 Delivery Post Treatment  room air  -LE     Pre Patient Position  Supine  -LE     Intra Patient Position  Sitting  -LE     Post Patient Position  Supine  -LE     Row Name 08/09/21 1243          Positioning and Restraints    Pre-Treatment Position  in bed  -LE     Post Treatment Position  bed  -LE     In Bed  fowlers;call light within reach;encouraged to call for assist;with family/caregiver with RN  who is giving pt a bath.  -LE       User Key  (r) = Recorded By, (t) = Taken By, (c) = Cosigned By    Initials Name Provider Type    Gricelda Mcdonald OTR Occupational Therapist        Outcome Measures     Row Name 08/09/21 1259          How much help from another is currently needed...    Putting on and taking off regular lower body clothing?  1  -LE     Bathing (including washing, rinsing, and drying)  1  -LE     Toileting (which  includes using toilet bed pan or urinal)  1  -LE     Putting on and taking off regular upper body clothing  2  -LE     Taking care of personal grooming (such as brushing teeth)  2  -LE     Eating meals  3  -LE     AM-PAC 6 Clicks Score (OT)  10  -LIEN     Row Name 08/09/21 1259          Modified [place] Scale    Modified [place] Scale  5 - Severe disability.  Bedridden, incontinent, and requiring constant nursing care and attention.  -LIEN     Row Name 08/09/21 1259          Functional Assessment    Outcome Measure Options  AM-PAC 6 Clicks Daily Activity (OT);Modified Rito  -LE       User Key  (r) = Recorded By, (t) = Taken By, (c) = Cosigned By    Initials Name Provider Type    Gricelda Mcdonald OTR Occupational Therapist          Occupational Therapy Education                 Title: PT OT SLP Therapies (In Progress)     Topic: Occupational Therapy (Done)     Point: ADL training (Done)     Description:   Instruct learner(s) on proper safety adaptation and remediation techniques during self care or transfers.   Instruct in proper use of assistive devices.              Learning Progress Summary           Patient Acceptance, E,D, VU,DU by LIEN at 8/9/2021 1259    Comment: role of OT,plan of care, body mechanics and hand placement to move to EOB.   Family Acceptance, E,D, VU,DU by LIEN at 8/9/2021 1259    Comment: role of OT,plan of care, body mechanics and hand placement to move to EOB.                   Point: Precautions (Done)     Description:   Instruct learner(s) on prescribed precautions during self-care and functional transfers.              Learning Progress Summary           Patient Acceptance, E,D, VU,DU by LIEN at 8/9/2021 1259    Comment: role of OT,plan of care, body mechanics and hand placement to move to EOB.   Family Acceptance, E,D, VU,DU by LIEN at 8/9/2021 1259    Comment: role of OT,plan of care, body mechanics and hand placement to move to EOB.                   Point: Body mechanics (Done)     Description:    Instruct learner(s) on proper positioning and spine alignment during self-care, functional mobility activities and/or exercises.              Learning Progress Summary           Patient Acceptance, E,NEPTALI, DEREJE,FLAQUITA by LIEN at 8/9/2021 1259    Comment: role of OT,plan of care, body mechanics and hand placement to move to EOB.   Family Acceptance, E,NEPTALI, DEREJE,FLAQUITA by LIEN at 8/9/2021 1259    Comment: role of OT,plan of care, body mechanics and hand placement to move to EOB.                               User Key     Initials Effective Dates Name Provider Type Discipline     06/16/21 -  Gricelda Duval OTR Occupational Therapist OT              OT Recommendation and Plan  Planned Therapy Interventions (OT): activity tolerance training, adaptive equipment training, BADL retraining, occupation/activity based interventions, patient/caregiver education/training, transfer/mobility retraining, functional balance retraining, strengthening exercise, passive ROM/stretching, ROM/therapeutic exercise  Therapy Frequency (OT): 5 times/wk  Plan of Care Review  Plan of Care Reviewed With: patient, daughter  Outcome Summary: Pt admit from home with headache, slurred speech, LE weakness.  CT (+) for SDH and s/p dusty hole on 8/8/21.   Pt seen by OT and is assist of 1 to move to sit EOB.  Pt tolerates well.  Demo limited B Shld AROM and difficult to determine if new limitations.   Pt may benefit from skilled OT in acute care to increase safety, independence and UE function.  Anticipate rehab at d/c.     Time Calculation:   Time Calculation- OT     Row Name 08/09/21 1300 08/09/21 0841          Time Calculation- OT    OT Start Time  1026  -LE  --     OT Stop Time  1048  -LE  --     OT Time Calculation (min)  22 min  -LE  --     Total Timed Code Minutes- OT  12 minute(s)  -LE  --     OT Received On  08/09/21  -LE  --     OT - Next Appointment  08/10/21  -LE  08/10/21  -LE     OT Goal Re-Cert Due Date  08/23/21  -LE  --        Timed Charges    42017 - OT  Therapeutic Activity Minutes  12  -LE  --        Untimed Charges    OT Eval/Re-eval Minutes  10  -LE  --        Total Minutes    Timed Charges Total Minutes  12  -LE  --     Untimed Charges Total Minutes  10  -LE  --      Total Minutes  22  -LE  --       User Key  (r) = Recorded By, (t) = Taken By, (c) = Cosigned By    Initials Name Provider Type    Gricelda Mcdonald OTR Occupational Therapist        Therapy Charges for Today     Code Description Service Date Service Provider Modifiers Qty    [id number]  OT EVAL MOD COMPLEXITY 2 8/9/2021 Gricelda Dvual OTR GO 1    [id number]  OT THERAPEUTIC ACT EA 15 MIN 8/9/2021 Gricelda Duval OTR GO 1               KYLER Pradhan  8/9/2021

## 2021-08-09 NOTE — PLAN OF CARE
Goal Outcome Evaluation:  Plan of Care Reviewed With: patient, daughter           Outcome Summary: Pt admit from home with headache, slurred speech, LE weakness.  CT (+) for SDH and s/p dusty hole on 8/8/21.   Pt seen by OT and is assist of 1 to move to sit EOB.  Pt tolerates well.  Demo limited B Shld AROM and difficult to determine if new limitations.   Pt may benefit from skilled OT in acute care to increase safety, independence and UE function.  Anticipate rehab at d/c.        Patient was placed in a face mask intermittently during this therapy encounter. Therapist used appropriate personal protective equipment including surgical mask, eye shield and gloves during the entire therapy session. Hand hygiene was completed before and after therapy session. Patient is not in enhanced droplet precautions.

## 2021-08-09 NOTE — PROGRESS NOTES
"NEUROSURGERY PROGRESS NOTE     LOS: 4 days   Patient Care Team:  Mega Esparza MD as PCP - General (Family Medicine)  Micaela English MD as Consulting Physician (Gastroenterology)  Mary Grace Culp MD as Consulting Physician (Cardiology)  Jp Krause Jr., MD as Consulting Physician (Hematology and Oncology)  Yasmeen Pichardo [facility] as Pharmacist  Micaela English MD as Referring Physician (Gastroenterology)  Devon Valadez MD as Consulting Physician (Hematology and Oncology)  Rusty Interiano, JamarD as Pharmacist (Pharmacy)    Chief Complaint:  Sudden decline in neurological status last evening (significant lethargy and severe headache) prompted urgent L sided dusty holes for evacuation of L subacute on chronic subdural hematoma.     Subjective       Interval History: Much more alert.  States headache is \"gone.\"    History taken from: patient chart RN    Objective        Vital Signs  Temp:  [97.6 °F (36.4 °C)-99 °F (37.2 °C)] 99 °F (37.2 °C)  Heart Rate:  [66-84] 73  Resp:  [12-20] 20  BP: (105-129)/(54-96) 111/56     Physical Exam:     AA&O x 3.  Face symmetric.  Tongue protruded midline.  Follows commands in all 4 extremities.  No drift.  Left subdural drain intact with minimal output noted.     Results Review:     I reviewed the patient's new clinical results.     CT SCAN OF THE HEAD WITHOUT CONTRAST 8/8/21 AT 1820    No significant interval change when compared to previous study performed approximately 6 hours prior to current study.  2 left parietal dusty holes noted with subdural drainage catheter in place. Left cerebral hemisphere subdural collection has decreased in size with improvement in midline shift.      .  Results from last 7 days   Lab Units 08/09/21 0413 08/08/21 0418 08/07/21  0643   WBC 10*3/mm3 4.87 4.21 3.83   HEMOGLOBIN g/dL 11.6* 11.2* 11.7*   HEMATOCRIT % 34.1 33.8* 35.7   PLATELETS 10*3/mm3 239 225 206     .  Results from last 7 days   Lab Units 08/09/21 0413 08/08/21 0418 08/08/21  0418 " 08/05/21 2039 08/05/21 2039   SODIUM mmol/L 138  --  139  --  136   POTASSIUM mmol/L 5.0  --  4.2  --  3.9   CHLORIDE mmol/L 104  --  106  --  102   CO2 mmol/L 23.8  --  24.8  --  25.3   BUN mg/dL 9  --  7*  --  7*   CREATININE mg/dL 0.64  --  0.53*  --  0.60   GLUCOSE mg/dL 134*   < > 82   < > 150*   CALCIUM mg/dL 9.7  --  9.7  --  10.3    < > = values in this interval not displayed.       Assessment/Plan       ICH (intracerebral hemorrhage) (CMS/HCC)    Subdural hematoma (CMS/HCC)    POD 1 left sided bur holes for evacuation of subacute on chronic subdural hematoma that was causing mass-effect      Subdural drain removed today  CT head in a.m.; transfer to floor, mobilize if CT stable      SHAHID Ware  08/09/21  11:20 EDT

## 2021-08-09 NOTE — PROGRESS NOTES
[facility]  [phone]  Sixto Hugo MD    Subjective:  LOS: 4    Chief Complaint:  weakness    Status post sub-dural drain. Doing better and appears more alert. No complaints of HA etc.    Objective   Vital Signs past 24hrs    Temp range: Temp (24hrs), Av.1 °F (36.7 °C), Min:97.6 °F (36.4 °C), Max:99 °F (37.2 °C)    BP range: BP: (105-129)/(54-96) 117/65  Pulse range: Heart Rate:  [66-84] 70  Resp rate range: Resp:  [12-20] 20    Device (Oxygen Therapy): room airFlow (L/min):  [2-4] 2  Oxygen range:SpO2:  [89 %-100 %] 95 %      94.9 kg (209 lb 3.5 oz); Body mass index is 35.89 kg/m².    Intake/Output Summary (Last 24 hours) at 2021 0913  Last data filed at 2021 0407  Gross per 24 hour   Intake 1647 ml   Output 312 ml   Net 1335 ml       Physical Exam  Constitutional:       Appearance: She is obese.   HENT:      Head: Normocephalic.      Mouth/Throat:      Mouth: Mucous membranes are moist.   Eyes:      Pupils: Pupils are equal, round, and reactive to light.   Cardiovascular:      Rate and Rhythm: Normal rate and regular rhythm.      Heart sounds: No murmur heard.        Comments: paced  Pulmonary:      Effort: Pulmonary effort is normal.      Breath sounds: Normal breath sounds.   Abdominal:      General: Bowel sounds are normal.      Palpations: Abdomen is soft. There is no mass.      Tenderness: There is no abdominal tenderness.   Musculoskeletal:      Cervical back: Neck supple.   Neurological:      Mental Status: She is alert.       Results Review:    I have reviewed the laboratory and imaging data since the last note by [facility] physician.  My annotations are noted in assessment and plan.    Medication Review:  I have reviewed the current MAR.  My annotations are noted in assessment and plan.    niCARdipine, 5-15 mg/hr      Plan   PCCM Problems  SDH, subacute and chronic  Cerebral compression with midline shift  Relevant Medical Diagnoses  Afib on AC, reversed  Recent GIB  SSS with  pacer  Ex-smoker  COPD  dCHF  MGUS  HUGO        Plan of Treatment    Appears better after dusty hole. Monitor.    BP controlled - not on drips. On Keppra, change to po.    Consult Cards outpatient for Afib decision on AC.    Spoke to dtr at bedside.     Possible transfer out of ICU tomorrow.    Sixto Hugo MD  08/09/21  09:13 EDT    While in the room and during my examination of the patient I wore gloves, gown, mask, eye protection and followed enhanced droplet/contact isolation protocol and precautions.  I washed my hands before and after this patient encounter.    Part of this note may be an electronic transcription/translation of spoken language to printed text using the Dragon Dictation System.

## 2021-08-09 NOTE — SIGNIFICANT NOTE
08/09/21 0841   OTHER   Discipline occupational therapist   Rehab Time/Intention   Session Not Performed   (per RN plan to d/c drain from dusty hole later today.  Agree to follow up next service date for OT eval needs.)   Recommendation   OT - Next Appointment 08/10/21

## 2021-08-09 NOTE — PROGRESS NOTES
NEUROSURGERY PROCEDURE NOTE      Removal of L postsurgical cranial subdural drain.     The L MICHELE drain was putting out very minimal if any fluid. Dr. Callaway aware and gave instructions to remove the drain today.     The left frontal subdural drain stitch was removed and the drain was slowly removed; with drain catheter intact.  The site was prepped using sterile technique.  The site was closed using 3-0 Monocryl suture x 1 interrupted stitch.  The patient tolerated the procedure well.  The site was covered with a sterile bandage.

## 2021-08-09 NOTE — SIGNIFICANT NOTE
08/09/21 1552   OTHER   Discipline physical therapist   Rehab Time/Intention   Session Not Performed patient/family declined treatment  (Pt and family report that pt is not feeling well at this time and pt had a rough night. Family asked for PT to check back tomorrow.)   Recommendation   PT - Next Appointment 08/10/21

## 2021-08-09 NOTE — PROGRESS NOTES
"NEUROSURGERY POST OP PROGRESS NOTE     LOS: 4 days   Patient Care Team:  Mega Esparza MD as PCP - General (Family Medicine)  Micaela English MD as Consulting Physician (Gastroenterology)  Mary Grace Culp MD as Consulting Physician (Cardiology)  Jp Krause Jr., MD as Consulting Physician (Hematology and Oncology)  Yasmeen Pichardo RAMIRO as Pharmacist  Micaela English MD as Referring Physician (Gastroenterology)  Devon Valadez MD as Consulting Physician (Hematology and Oncology)  Rusty Interiano, JamarD as Pharmacist (Pharmacy)    Chief Complaint: Postoperative visit      Subjective     Interval History: Reports that the headache is \"gone.\"    History taken from: patient chart RN    Objective      Vital Signs  Temp:  [97.7 °F (36.5 °C)-99 °F (37.2 °C)] 99 °F (37.2 °C)  Heart Rate:  [67-92] 79  BP: ()/(53-85) 116/62       Results Review:     I reviewed the patient's new clinical results.      Results from last 7 days   Lab Units 08/09/21 0413 08/08/21 0418 08/07/21  0643   WBC 10*3/mm3 4.87 4.21 3.83   HEMOGLOBIN g/dL 11.6* 11.2* 11.7*   HEMATOCRIT % 34.1 33.8* 35.7   PLATELETS 10*3/mm3 239 225 206     .  Results from last 7 days   Lab Units 08/09/21 0413 08/08/21 0418 08/08/21 0418 08/05/21 2039 08/05/21 2039   SODIUM mmol/L 138  --  139  --  136   POTASSIUM mmol/L 5.0  --  4.2  --  3.9   CHLORIDE mmol/L 104  --  106  --  102   CO2 mmol/L 23.8  --  24.8  --  25.3   BUN mg/dL 9  --  7*  --  7*   CREATININE mg/dL 0.64  --  0.53*  --  0.60   GLUCOSE mg/dL 134*   < > 82   < > 150*   CALCIUM mg/dL 9.7  --  9.7  --  10.3    < > = values in this interval not displayed.     Postop head CT yesterday evening showed 2 left parietal dusty holes with placement of drainage catheter.  The CT had already shown decreased size of the left cerebral hemisphere subdural collection.  There are some smaller stable subdural collections remaining.      Assessment/Plan       ICH (intracerebral hemorrhage) (CMS/HCC)    " Subdural hematoma (CMS/HCC)    POD 1 left bur holes for evacuation of subacute on chronic subdural hematoma      Left subdural drain removed today  Repeat CT head in a.m.; mobilize if stable  Consider transfer out of ICU tomorrow    SHAHID Ware  08/09/21  18:20 EDT

## 2021-08-09 NOTE — NURSING NOTE
Plan of Care Reviewed with: Patient, Daughter  Patient remains in ICU. Q1 Neuro checks completed. Patient remains drowsy. Patient is oriented and continues to follow commands. Drain remains in place. PRN morphine given for head pain. Head pain increases when patient coughs. Daughter updated at bedside. Continue to monitor.

## 2021-08-10 LAB
ANION GAP SERPL CALCULATED.3IONS-SCNC: 8.3 MMOL/L (ref 5–15)
BUN SERPL-MCNC: 13 MG/DL (ref 8–23)
BUN/CREAT SERPL: 20.3 (ref 7–25)
CALCIUM SPEC-SCNC: 10 MG/DL (ref 8.6–10.5)
CHLORIDE SERPL-SCNC: 102 MMOL/L (ref 98–107)
CO2 SERPL-SCNC: 26.7 MMOL/L (ref 22–29)
CREAT SERPL-MCNC: 0.64 MG/DL (ref 0.57–1)
DEPRECATED RDW RBC AUTO: 47.4 FL (ref 37–54)
ERYTHROCYTE [DISTWIDTH] IN BLOOD BY AUTOMATED COUNT: 13.3 % (ref 12.3–15.4)
GFR SERPL CREATININE-BSD FRML MDRD: 107 ML/MIN/1.73
GLUCOSE SERPL-MCNC: 103 MG/DL (ref 65–99)
HCT VFR BLD AUTO: 35.1 % (ref 34–46.6)
HGB BLD-MCNC: 11.4 G/DL (ref 12–15.9)
MAGNESIUM SERPL-MCNC: 2 MG/DL (ref 1.6–2.4)
MCH RBC QN AUTO: 31.3 PG (ref 26.6–33)
MCHC RBC AUTO-ENTMCNC: 32.5 G/DL (ref 31.5–35.7)
MCV RBC AUTO: 96.4 FL (ref 79–97)
PLATELET # BLD AUTO: 206 10*3/MM3 (ref 140–450)
PMV BLD AUTO: 9 FL (ref 6–12)
POTASSIUM SERPL-SCNC: 4.2 MMOL/L (ref 3.5–5.2)
RBC # BLD AUTO: 3.64 10*6/MM3 (ref 3.77–5.28)
SODIUM SERPL-SCNC: 137 MMOL/L (ref 136–145)
WBC # BLD AUTO: 8.77 10*3/MM3 (ref 3.4–10.8)

## 2021-08-10 PROCEDURE — 83735 ASSAY OF MAGNESIUM: CPT | Performed by: INTERNAL MEDICINE

## 2021-08-10 PROCEDURE — 97110 THERAPEUTIC EXERCISES: CPT

## 2021-08-10 PROCEDURE — 80048 BASIC METABOLIC PNL TOTAL CA: CPT | Performed by: INTERNAL MEDICINE

## 2021-08-10 PROCEDURE — 99024 POSTOP FOLLOW-UP VISIT: CPT | Performed by: NURSE PRACTITIONER

## 2021-08-10 PROCEDURE — 85027 COMPLETE CBC AUTOMATED: CPT | Performed by: INTERNAL MEDICINE

## 2021-08-10 PROCEDURE — 97110 THERAPEUTIC EXERCISES: CPT | Performed by: OCCUPATIONAL THERAPIST

## 2021-08-10 RX ADMIN — SODIUM CHLORIDE, PRESERVATIVE FREE 10 ML: 5 INJECTION INTRAVENOUS at 20:12

## 2021-08-10 RX ADMIN — SODIUM CHLORIDE, PRESERVATIVE FREE 10 ML: 5 INJECTION INTRAVENOUS at 08:05

## 2021-08-10 RX ADMIN — LEVETIRACETAM 500 MG: 500 TABLET, FILM COATED ORAL at 08:05

## 2021-08-10 RX ADMIN — LEVETIRACETAM 500 MG: 500 TABLET, FILM COATED ORAL at 20:12

## 2021-08-10 NOTE — THERAPY TREATMENT NOTE
Patient Name: Brittany Villeda  : 1935    MRN: [id number]                              Today's Date: 8/10/2021       Admit Date: 2021    Visit Dx:     ICD-10-CM ICD-9-CM   1. Subdural hematoma (CMS/HCC)  S06.5X9A 432.1   2. SAH (subarachnoid hemorrhage) (CMS/HCC)  I60.9 430   3. 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)   • Other chest pain   • Lichen sclerosus   • Heart block   • Sleep-related hypoxia   • Bradycardia   • Shoulder arthritis   • History of atrial fibrillation   • Pacemaker   • Paroxysmal SVT (supraventricular tachycardia) (CMS/HCC)   • History of chest pain   • Palpitations   • Atrial fibrillation (CMS/HCC)   • Rectal bleeding   • Arthritis   • Ascending aortic aneurysm (CMS/HCC)   • Mediastinal lymphadenopathy   • Immobility   • Left knee pain   • Chronic anticoagulation   • Hemarthrosis of left knee   • Anemia   • Obesity (BMI 30-39.9)   • Generalized weakness   • Dysautonomia (CMS/HCC)   • Weakness of both lower extremities   • Macrocytosis   • Hypercalcemia   • Arthritis of right wrist   • Hyperparathyroidism (CMS/HCC)   • Choledocholithiasis   • Essential hypertension   • Hyperglycemia   • Epigastric pain   • Duodenal ulcer   • Hemorrhagic gastritis   • Exertional dyspnea   • COPD (chronic obstructive pulmonary disease) (CMS/HCC)   • Functional quadriplegia (CMS/HCC)   • Hip pain   • Osteoarthritis of glenohumeral joint   • Other malaise and fatigue   • Shoulder pain   • ICH  (intracerebral hemorrhage) (CMS/HCC)   • Subdural hematoma (CMS/HCC)     Past Medical History:   Diagnosis Date   • Acute UTI (urinary tract infection) 4/8/2021   • Anemia    • Arrhythmia    • Arthritis    • Asthma    • Bradycardia    • Chest pain    • Choledocholithiasis 5/9/2021   • Colitis    • COPD (chronic obstructive pulmonary disease) (CMS/HCC)    • Diastolic dysfunction    • Essential hypertension 5/12/2016   • GERD (gastroesophageal reflux disease)    • Heart block    • Hypertension    • Hypertensive heart disease    • Hyperthyroidism    • Kidney stone    • Leukopenia    • Low back pain    • MGUS (monoclonal gammopathy of unknown significance)    • Nephrolithiasis    • Obesity    • Paroxysmal atrial fibrillation (CMS/HCC)    • Peptic ulceration    • Persistent atrial fibrillation (CMS/HCC)    • PSVT (paroxysmal supraventricular tachycardia) (CMS/HCC)    • Pulmonary hypertension (CMS/HCC)    • Rectal bleed    • Sleep apnea    • Systemic hypertension    • Trifascicular block    • 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)    • Ventricular tachycardia (CMS/HCC)     nonsustained   • Vertigo      Past Surgical History:   Procedure Laterality Date   • BACK SURGERY      lumbar fusion   • DUSTY HOLE Left 8/8/2021    Procedure: Left-sided dusty holes for evacuation of subdural hematoma;  Surgeon: Gustavo Callaway MD;  Location: [facility] MAIN OR;  Service: Neurosurgery;  Laterality: Left;   • CARDIAC ELECTROPHYSIOLOGY PROCEDURE N/A 11/7/2018    Procedure: Pacemaker DC new   [place];  Surgeon: Jesus Granda MD;  Location: [facility] CATH INVASIVE LOCATION;  Service: Cardiology   • CHOLECYSTECTOMY     • COLONOSCOPY  06/16/2014    colitis, cryptitis,  tics, NBIH, TA w/low grade dysplasia   • COLONOSCOPY N/A 10/28/2019    Procedure: COLONOSCOPY WITH COLD AND HOT POLYPECTOMIES;  Surgeon: Micaela English MD;  Location: [facility] ENDOSCOPY;  Service: Gastroenterology   • ENDOSCOPY N/A 5/13/2021     Procedure: ESOPHAGOGASTRODUODENOSCOPY with BX;  Surgeon: Stefan Mfcadden MD;  Location: [facility] ENDOSCOPY;  Service: Gastroenterology;  Laterality: N/A;  EPIGASTRIC PAIN  --DUODENAL ULCER, HEMORRHAGIC GASTRITIS, HIATAL HERNIA    • HEMORRHOIDECTOMY     • HYSTERECTOMY     • JOINT REPLACEMENT     • KNEE ARTHROPLASTY     • MYOMECTOMY     • PACEMAKER IMPLANTATION     • SHOULDER SURGERY     • SINUS SURGERY     • TOE NAIL AMPUTATION  03/04/2019   • TONSILLECTOMY       General Information     Row Name 08/10/21 1529          Physical Therapy Time and Intention    Document Type  therapy note (daily note)  -MD     Mode of Treatment  physical therapy;co-treatment;occupational therapy  -MD     Row Name 08/10/21 1529          General Information    Existing Precautions/Restrictions  fall  -MD     Row Name 08/10/21 1529          Cognition    Orientation Status (Cognition)  oriented to;person  -MD       User Key  (r) = Recorded By, (t) = Taken By, (c) = Cosigned By    Initials Name Provider Type    Janice Duncan, PT Physical Therapist        Mobility     Row Name 08/10/21 1529          Bed Mobility    Sit-Supine [place] (Bed Mobility)  verbal cues;maximum assist (25% patient effort);2 person assist  -MD     Assistive Device (Bed Mobility)  bed rails;head of bed elevated;draw sheet  -MD     Row Name 08/10/21 1529          Transfers    Comment (Transfers)  Pt performed sit-stand x3 on 3rd attempt PT sat infront of pt blocking B knees to prevent buckling and encourage more upright posture.  -MD     Row Name 08/10/21 1529          Sit-Stand Transfer    Sit-Stand [place] (Transfers)  verbal cues;nonverbal cues (demo/gesture);moderate assist (50% patient effort);2 person assist  -MD     Assistive Device (Sit-Stand Transfers)  walker, front-wheeled  -MD       User Key  (r) = Recorded By, (t) = Taken By, (c) = Cosigned By    Initials Name Provider Type    Janice Duncan, PT Physical Therapist        Obj/Interventions     Row  Name 08/10/21 1531          Balance    Comment, Balance  static sitting balance: CGA this pm.  Static standing balance: Mod A x2 w RWx  -MD       User Key  (r) = Recorded By, (t) = Taken By, (c) = Cosigned By    Initials Name Provider Type    Janice Duncan, PT Physical Therapist        Goals/Plan    No documentation.       Clinical Impression     Row Name 08/10/21 1532          Pain    Additional Documentation  Pain Scale: FACES Pre/Post-Treatment (Group)  -MD     Row Name 08/10/21 1532          Pain Scale: Numbers Pre/Post-Treatment    Pain Intervention(s)  Repositioned;Ambulation/increased activity  -MD     Row Name 08/10/21 1532          Pain Scale: FACES Pre/Post-Treatment    Pain: FACES Scale, Pretreatment  0-->no hurt  -MD     Row Name 08/10/21 1532          Plan of Care Review    Outcome Summary  Pt progressing w standing attempts and tolerance.  Pt w less knee buckling today w standing.  PT sat infront of pt blocking B knees to encourage more upright posture w standing.  Pt also progressing w sitting balance and duration as demonstrated by requiring less assistance w sitting balance this pm.  -MD     Row Name 08/10/21 1532          Positioning and Restraints    Pre-Treatment Position  in bed  -MD     Post Treatment Position  bed  -MD     In Bed  supine;call light within reach;with family/caregiver  -MD       User Key  (r) = Recorded By, (t) = Taken By, (c) = Cosigned By    Initials Name Provider Type    Janice Duncan, PT Physical Therapist        Outcome Measures     Row Name 08/10/21 1535          How much help from another person do you currently need...    Turning from your back to your side while in flat bed without using bedrails?  3  -MD     Moving from lying on back to sitting on the side of a flat bed without bedrails?  3  -MD     Moving to and from a bed to a chair (including a wheelchair)?  2  -MD     Standing up from a chair using your arms (e.g., wheelchair, bedside chair)?  2  -MD     Climbing  3-5 steps with a railing?  1  -MD     To walk in hospital room?  1  -MD     AM-PAC 6 Clicks Score (PT)  12  -MD     Row Name 08/10/21 1535          Functional Assessment    Outcome Measure Options  AM-PAC 6 Clicks Basic Mobility (PT)  -MD       User Key  (r) = Recorded By, (t) = Taken By, (c) = Cosigned By    Initials Name Provider Type    Janice Duncan, PT Physical Therapist                       Physical Therapy Education                 Title: PT OT SLP Therapies (In Progress)     Topic: Physical Therapy (In Progress)     Point: Mobility training (Done)     Learning Progress Summary           Patient Acceptance, E,TB,D, VU,NR by  at 8/6/2021 1134                   Point: Home exercise program (Not Started)     Learner Progress:  Not documented in this visit.          Point: Body mechanics (Done)     Learning Progress Summary           Patient Acceptance, E,TB,D, VU,NR by  at 8/6/2021 1134                   Point: Precautions (Done)     Learning Progress Summary           Patient Acceptance, E, VU by MD at 8/10/2021 1536    Acceptance, E,TB,D, VU,NR by  at 8/6/2021 1134                               User Key     Initials Effective Dates Name Provider Type Discipline     06/16/21 -  Heaven Acosta PT Physical Therapist PT    MD 06/16/21 -  Janice Hopkins PT Physical Therapist PT              PT Recommendation and Plan     Outcome Summary: Pt progressing w standing attempts and tolerance.  Pt w less knee buckling today w standing.  PT sat infront of pt blocking B knees to encourage more upright posture w standing.  Pt also progressing w sitting balance and duration as demonstrated by requiring less assistance w sitting balance this pm.     Time Calculation:   PT Charges     Row Name 08/10/21 5282             Time Calculation    Start Time  1415  -MD      Stop Time  1426  -MD      Time Calculation (min)  11 min  -MD      PT Received On  08/10/21  -MD      PT - Next Appointment  08/11/21  -MD        User Key   (r) = Recorded By, (t) = Taken By, (c) = Cosigned By    Initials Name Provider Type    Janice Duncan, PT Physical Therapist        Therapy Charges for Today     Code Description Service Date Service Provider Modifiers Monet    78157650115 HC PT THER PROC EA 15 MIN 8/10/2021 Janice Hopkins, PT GP 1    [id number] HC PT THER SUPP EA 15 MIN 8/10/2021 Janice Hopkins, PT GP 1        Patient was not wearing a face mask during this therapy encounter. Therapist used appropriate personal protective equipment including mask and gloves.  Mask used was standard procedure mask. Appropriate PPE was worn during the entire therapy session. Hand hygiene was completed before and after therapy session. Patient is not in enhanced droplet precautions.       PT G-Codes  Outcome Measure Options: AM-PAC 6 Clicks Basic Mobility (PT)  AM-PAC 6 Clicks Score (PT): 12  AM-PAC 6 Clicks Score (OT): 10  Modified Rito Scale: 5 - Severe disability.  Bedridden, incontinent, and requiring constant nursing care and attention.    Janice Hopkins PT  8/10/2021

## 2021-08-10 NOTE — NURSING NOTE
Plan of Care Reviewed with: Patient, Daughter  Patient remains in ICU. Patient continues to be oriented and follows commands. Around 2045, patient complained of intermittent vision changes. Patient stated seeing rain falling in her field of vision in her right eye, specifically when drinking or eating. Patient did not have any other neuro changes when completing assessment. Day shift RN stated the patient did complain of the same intermittent vision changes one time at the end of day shift. Notified Neuro Sx of patient status, orders given to get STAT CT scan and call back with results. CT scan completed and called Neuro Sx back with results, orders given to D/C previously scheduled AM CT scan. Patient has not complained of any other vision changes since the beginning of shift. Patient does not complain of pain. Patient had a BMx1. Daughter updated at bedside. Waiting on am labs. Continue to monitor.

## 2021-08-10 NOTE — PLAN OF CARE
Goal Outcome Evaluation:              Outcome Summary: Pt progressing w standing attempts and tolerance.  Pt w less knee buckling today w standing.  PT sat infront of pt blocking B knees to encourage more upright posture w standing.  Pt also progressing w sitting balance and duration as demonstrated by requiring less assistance w sitting balance this pm.

## 2021-08-10 NOTE — THERAPY TREATMENT NOTE
Patient Name: Brittany Villeda  : 1935    MRN: [id number]                              Today's Date: 8/10/2021       Admit Date: 2021    Visit Dx:     ICD-10-CM ICD-9-CM   1. Subdural hematoma (CMS/HCC)  S06.5X9A 432.1   2. SAH (subarachnoid hemorrhage) (CMS/HCC)  I60.9 430   3. 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)   • Other chest pain   • Lichen sclerosus   • Heart block   • Sleep-related hypoxia   • Bradycardia   • Shoulder arthritis   • History of atrial fibrillation   • Pacemaker   • Paroxysmal SVT (supraventricular tachycardia) (CMS/HCC)   • History of chest pain   • Palpitations   • Atrial fibrillation (CMS/HCC)   • Rectal bleeding   • Arthritis   • Ascending aortic aneurysm (CMS/HCC)   • Mediastinal lymphadenopathy   • Immobility   • Left knee pain   • Chronic anticoagulation   • Hemarthrosis of left knee   • Anemia   • Obesity (BMI 30-39.9)   • Generalized weakness   • Dysautonomia (CMS/HCC)   • Weakness of both lower extremities   • Macrocytosis   • Hypercalcemia   • Arthritis of right wrist   • Hyperparathyroidism (CMS/HCC)   • Choledocholithiasis   • Essential hypertension   • Hyperglycemia   • Epigastric pain   • Duodenal ulcer   • Hemorrhagic gastritis   • Exertional dyspnea   • COPD (chronic obstructive pulmonary disease) (CMS/HCC)   • Functional quadriplegia (CMS/HCC)   • Hip pain   • Osteoarthritis of glenohumeral joint   • Other malaise and fatigue   • Shoulder pain   • ICH  (intracerebral hemorrhage) (CMS/HCC)   • Subdural hematoma (CMS/HCC)     Past Medical History:   Diagnosis Date   • Acute UTI (urinary tract infection) 4/8/2021   • Anemia    • Arrhythmia    • Arthritis    • Asthma    • Bradycardia    • Chest pain    • Choledocholithiasis 5/9/2021   • Colitis    • COPD (chronic obstructive pulmonary disease) (CMS/HCC)    • Diastolic dysfunction    • Essential hypertension 5/12/2016   • GERD (gastroesophageal reflux disease)    • Heart block    • Hypertension    • Hypertensive heart disease    • Hyperthyroidism    • Kidney stone    • Leukopenia    • Low back pain    • MGUS (monoclonal gammopathy of unknown significance)    • Nephrolithiasis    • Obesity    • Paroxysmal atrial fibrillation (CMS/HCC)    • Peptic ulceration    • Persistent atrial fibrillation (CMS/HCC)    • PSVT (paroxysmal supraventricular tachycardia) (CMS/HCC)    • Pulmonary hypertension (CMS/HCC)    • Rectal bleed    • Sleep apnea    • Systemic hypertension    • Trifascicular block    • 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 MAIN OR;  Service: Neurosurgery;  Laterality: Left;   • CARDIAC ELECTROPHYSIOLOGY PROCEDURE N/A 11/7/2018    Procedure: Pacemaker DC new   [place];  Surgeon: Jesus Granda MD;  Location: [facility] CATH INVASIVE LOCATION;  Service: Cardiology   • CHOLECYSTECTOMY     • COLONOSCOPY  06/16/2014    colitis, cryptitis,  tics, NBIH, TA w/low grade dysplasia   • COLONOSCOPY N/A 10/28/2019    Procedure: COLONOSCOPY WITH COLD AND HOT POLYPECTOMIES;  Surgeon: Micaela English MD;  Location: [facility] ENDOSCOPY;  Service: Gastroenterology   • ENDOSCOPY N/A 5/13/2021     Procedure: ESOPHAGOGASTRODUODENOSCOPY with BX;  Surgeon: Stefan Mcfadden MD;  Location: [facility] ENDOSCOPY;  Service: Gastroenterology;  Laterality: N/A;  EPIGASTRIC PAIN  --DUODENAL ULCER, HEMORRHAGIC GASTRITIS, HIATAL HERNIA    • HEMORRHOIDECTOMY     • HYSTERECTOMY     • JOINT REPLACEMENT     • KNEE ARTHROPLASTY     • MYOMECTOMY     • PACEMAKER IMPLANTATION     • SHOULDER SURGERY     • SINUS SURGERY     • TOE NAIL AMPUTATION  03/04/2019   • TONSILLECTOMY       General Information     Row Name 08/10/21 1551          OT Time and Intention    Document Type  therapy note (daily note)  -     Mode of Treatment  individual therapy;occupational therapy;co-treatment;physical therapy partial co treat  -     Row Name 08/10/21 1551          General Information    Existing Precautions/Restrictions  fall  -     Row Name 08/10/21 1551          Cognition    Orientation Status (Cognition)  oriented to;person  -     Row Name 08/10/21 1551          Safety Issues, Functional Mobility    Impairments Affecting Function (Mobility)  balance;strength;endurance/activity tolerance;postural/trunk control  -       User Key  (r) = Recorded By, (t) = Taken By, (c) = Cosigned By    Initials Name Provider Type     Nicole Mcmullen, OTR Occupational Therapist          Mobility/ADL's     Row Name 08/10/21 1552          Bed Mobility    Scooting/Bridging Brule (Bed Mobility)  set up;moderate assist (50% patient effort);2 person assist  -     Supine-Sit Brule (Bed Mobility)  set up;standby assist;verbal cues  -     Sit-Supine Brule (Bed Mobility)  set up;2 person assist;maximum assist (25% patient effort)  -     Comment (Bed Mobility)  OT helped pt get to EOB, OT and PT helped get pt back into bed  -     Row Name 08/10/21 1552          Transfers    Transfers  sit-stand transfer  -     Comment (Transfers)  OT A PT in tsf pt into standing position x2  -     Row Name 08/10/21 1552          Toileting  Assessment/Training    Comment (Toileting)  pt already washed up this date and no toileting needs  -       User Key  (r) = Recorded By, (t) = Taken By, (c) = Cosigned By    Initials Name Provider Type    Nicole Leon OTR Occupational Therapist        Obj/Interventions     Row Name 08/10/21 1553          Shoulder (Therapeutic Exercise)    Shoulder AAROM (Therapeutic Exercise)  right;left;flexion;extension;10 repetitions;sitting 2 sets  -[facility] Name 08/10/21 1553          Elbow/Forearm (Therapeutic Exercise)    Elbow/Forearm (Therapeutic Exercise)  AROM (active range of motion)  -     Elbow/Forearm AROM (Therapeutic Exercise)  extension;flexion;supination;pronation;sitting;bilateral;3 sets;10 repetitions  -[facility] Name 08/10/21 1553          Motor Skills    Motor Skills  coordination;functional endurance  -     Coordination  WFL  -     Functional Endurance  fair +  -KP     Row Name 08/10/21 1553          Balance    Balance Assessment  sitting static balance  -     Static Sitting Balance  WFL;sitting, edge of bed  -     Dynamic Sitting Balance  mild impairment  -     Static Standing Balance  moderate impairment  -     Balance Interventions  sitting;standing;sit to stand;supported;static  -[facility] Name 08/10/21 1553          Therapeutic Exercise    Therapeutic Exercise  shoulder;elbow/forearm  -       User Key  (r) = Recorded By, (t) = Taken By, (c) = Cosigned By    Initials Name Provider Type    Nicole Leon OTR Occupational Therapist        Goals/Plan    No documentation.       Clinical Impression     [facility] Name 08/10/21 1554          Pain Assessment    Additional Documentation  Pain Scale: Numbers Pre/Post-Treatment (Group)  -KP     Row Name 08/10/21 1554          Pain Scale: Numbers Pre/Post-Treatment    Pretreatment Pain Rating  0/10 - no pain  -     Posttreatment Pain Rating  0/10 - no pain  -[facility] Name 08/10/21 1554          Plan of Care Review    Plan  of Care Reviewed With  patient;sibling  -KP     Progress  improving  -KP     Outcome Summary  pt worked w OT and then a partial co treat w OT and PT. pt completed sup to sit w SBA, increase time needed but pt able to complete herself. pt was A of two to get back into the bed and to scoot up in the bed. pt stood 2x A of two and PT blocking her LEs when PT sat in front of her she did well. Pt worked on UE AROM and AAROM (sh) to incr strength and enduance. pt had good sitting balance this date. cont OT to incr ADL, strength, balance, and tsf.  -KP     Row Name 08/10/21 7994          Positioning and Restraints    Pre-Treatment Position  in bed  -KP     Post Treatment Position  bed  -KP     In Bed  supine;call light within reach;encouraged to call for assist;with family/caregiver  -       User Key  (r) = Recorded By, (t) = Taken By, (c) = Cosigned By    Initials Name Provider Type    Nicole Leon, OTR Occupational Therapist        Outcome Measures     Row Name 08/10/21 9076          How much help from another is currently needed...    Putting on and taking off regular lower body clothing?  1  -KP     Bathing (including washing, rinsing, and drying)  2  -KP     Toileting (which includes using toilet bed pan or urinal)  1  -KP     Putting on and taking off regular upper body clothing  2  -KP     Taking care of personal grooming (such as brushing teeth)  3  -KP     Eating meals  3  -KP     AM-PAC 6 Clicks Score (OT)  12  -KP     Row Name 08/10/21 1535          How much help from another person do you currently need...    Turning from your back to your side while in flat bed without using bedrails?  3  -MD     Moving from lying on back to sitting on the side of a flat bed without bedrails?  3  -MD     Moving to and from a bed to a chair (including a wheelchair)?  2  -MD     Standing up from a chair using your arms (e.g., wheelchair, bedside chair)?  2  -MD     Climbing 3-5 steps with a railing?  1  -MD     To  walk in hospital room?  1  -MD     AM-PAC 6 Clicks Score (PT)  12  -MD     Row Name 08/10/21 1556 08/10/21 1535       Functional Assessment    Outcome Measure Options  AM-PAC 6 Clicks Daily Activity (OT)  -DANK  AM-PAC 6 Clicks Basic Mobility (PT)  -MD      User Key  (r) = Recorded By, (t) = Taken By, (c) = Cosigned By    Initials Name Provider Type    Nicole Leon, OTR Occupational Therapist    Janice Duncan PT Physical Therapist          Occupational Therapy Education                 Title: PT OT SLP Therapies (In Progress)     Topic: Occupational Therapy (Done)     Point: ADL training (Done)     Description:   Instruct learner(s) on proper safety adaptation and remediation techniques during self care or transfers.   Instruct in proper use of assistive devices.              Learning Progress Summary           Patient Acceptance, E,D, VU,DU by LIEN at 8/9/2021 1259    Comment: role of OT,plan of care, body mechanics and hand placement to move to EOB.   Family Acceptance, E,D, VU,DU by LEIN at 8/9/2021 1259    Comment: role of OT,plan of care, body mechanics and hand placement to move to EOB.                   Point: Precautions (Done)     Description:   Instruct learner(s) on prescribed precautions during self-care and functional transfers.              Learning Progress Summary           Patient Acceptance, E,D, VU,DU by LIEN at 8/9/2021 1259    Comment: role of OT,plan of care, body mechanics and hand placement to move to EOB.   Family Acceptance, E,D, VU,DU by LIEN at 8/9/2021 1259    Comment: role of OT,plan of care, body mechanics and hand placement to move to EOB.                   Point: Body mechanics (Done)     Description:   Instruct learner(s) on proper positioning and spine alignment during self-care, functional mobility activities and/or exercises.              Learning Progress Summary           Patient Acceptance, E,D, VU,DU by LIEN at 8/9/2021 1259    Comment: role of OT,plan of care, body  mechanics and hand placement to move to EOB.   Family Acceptance, E,D, VU,DU by LIEN at 8/9/2021 0227    Comment: role of OT,plan of care, body mechanics and hand placement to move to EOB.                               User Key     Initials Effective Dates Name Provider Type Angel EMMANUEL 06/16/21 -  Gricelda Duval OTR Occupational Therapist OT              OT Recommendation and Plan     Plan of Care Review  Plan of Care Reviewed With: patient, sibling  Progress: improving  Outcome Summary: pt worked w OT and then a partial co treat w OT and PT. pt completed sup to sit w SBA, increase time needed but pt able to complete herself. pt was A of two to get back into the bed and to scoot up in the bed. pt stood 2x A of two and PT blocking her LEs when PT sat in front of her she did well. Pt worked on UE AROM and AAROM (sh) to incr strength and enduance. pt had good sitting balance this date. cont OT to incr ADL, strength, balance, and tsf.     Time Calculation:   Time Calculation- OT     Row Name 08/10/21 1556             Time Calculation- OT    OT Start Time  1354  -KP      OT Stop Time  1426  -KP      OT Time Calculation (min)  32 min  -KP      Total Timed Code Minutes- OT  32 minute(s)  -KP      OT Received On  08/10/21  -      OT - Next Appointment  08/11/21  -         Timed Charges    12284 - OT Therapeutic Exercise Minutes  32  -KP         Total Minutes    Timed Charges Total Minutes  32  -KP       Total Minutes  32  -KP        User Key  (r) = Recorded By, (t) = Taken By, (c) = Cosigned By    Initials Name Provider Type    Nicole Leon OTR Occupational Therapist        Therapy Charges for Today     Code Description Service Date Service Provider Modifiers Qty    [id number] HC OT THER PROC EA 15 MIN 8/10/2021 Nicole Mcmullen OTR GO 2               KYLER Morton  8/10/2021

## 2021-08-10 NOTE — PROGRESS NOTES
Adult Nutrition  Assessment/PES    Patient Name:  Brittany Villeda  YOB: 1935  MRN: [id number]  Admit Date:  8/5/2021    Assessment Date:  8/10/2021    Comments:  Nutrition follow up. S/P : Left-sided bur holes for evacuation of left-sided subacute on chronic subdural hematoma on 8/8.  Tolerating po.  25-75% of meals.  Dicussed with pt and daughter at bedside. Meal preference obtained. Will follow clinical course, nutrition needs.    Reason for Assessment     Row Name 08/10/21 1106          Reason for Assessment    Reason For Assessment  follow-up protocol     Diagnosis  neurologic conditions 8/8 : Left-sided bur holes for evacuation of left-sided subacute on chronic subdural hematoma         Nutrition/Diet History     Row Name 08/10/21 1106          Nutrition/Diet History    Typical Food/Fluid Intake  appetite fair         Anthropometrics     Row Name 08/10/21 0500          Anthropometrics    Weight  95.1 kg (209 lb 10.5 oz)         Labs/Tests/Procedures/Meds     Row Name 08/10/21 1107          Labs/Procedures/Meds    Lab Results Reviewed  reviewed, pertinent     Lab Results Comments  glu        Diagnostic Tests/Procedures    Diagnostic Test/Procedure Reviewed  reviewed, pertinent     Diagnostic Test/Procedures Comments  CT Head        Medications    Pertinent Medications Reviewed  reviewed, pertinent     Pertinent Medications Comments  keppra         Physical Findings     Row Name 08/10/21 1107          Physical Findings    Overall Physical Appearance  obese     Skin  surgical incision           Nutrition Prescription Ordered     Row Name 08/10/21 1108          Nutrition Prescription PO    Common Modifiers  Cardiac         Evaluation of Received Nutrient/Fluid Intake     Row Name 08/10/21 1108          PO Evaluation    Number of Days PO Intake Evaluated  Insufficient Data     % PO Intake  25-75%         Problem/Interventions:    Intervention Goal     Row Name 08/10/21 1109          Intervention  Goal    General  Maintain nutrition;Disease management/therapy;Reduce/improve symptoms;Improved nutrition related lab(s);Meet nutritional needs for age/condition     PO  Tolerate PO;PO intake (%);Increase intake     PO Intake %  75 %     Weight  No significant weight loss         Nutrition Intervention     Row Name 08/10/21 1109          Nutrition Intervention    RD/Tech Action  Care plan reviewd;Follow Tx progress;Encourage intake;Interview for preference;Advise alternate selection;Advise available snack           Education/Evaluation     Row Name 08/10/21 1104          Education    Education  Will Instruct as appropriate        Monitor/Evaluation    Monitor  Skin status;Per protocol;I&O;Symptoms;PO intake;Pertinent labs;Weight           Electronically signed by:  Piper Caballero RD  08/10/21 11:09 EDT

## 2021-08-10 NOTE — PLAN OF CARE
Goal Outcome Evaluation:  Plan of Care Reviewed With: patient, sibling        Progress: improving  Outcome Summary: pt worked w OT and then a partial co treat w OT and PT. pt completed sup to sit w SBA, increase time needed but pt able to complete herself. pt was A of two to get back into the bed and to scoot up in the bed. pt stood 2x A of two and PT blocking her LEs when PT sat in front of her she did well. Pt worked on UE AROM and AAROM (sh) to incr strength and enduance. pt had good sitting balance this date. cont OT to incr ADL, strength, balance, and tsf.  OT wore all PPE, washed hands before/after

## 2021-08-10 NOTE — PROGRESS NOTES
"NEUROSURGERY PROGRESS NOTE     LOS: 5 days   Patient Care Team:  Mega Esparza MD as PCP - General (Family Medicine)  Micaela English MD as Consulting Physician (Gastroenterology)  Mary Grace Culp MD as Consulting Physician (Cardiology)  Jp Krause Jr., MD as Consulting Physician (Hematology and Oncology)  Yasmeen Pichardo RAMIRO as Pharmacist  Micaela English MD as Referring Physician (Gastroenterology)  Devon Valadez MD as Consulting Physician (Hematology and Oncology)  Rusty Interiano, JamarD as Pharmacist (Pharmacy)    Chief Complaint: Post op visit    Subjective       Interval History: Refused PT yesterday. Had c/o R eye visual disturbance described as \"seeing rain\" last evening with chewing and/or drinking fluids. Today, patient states visual issues have completely resolved. Denies headache, or nausea.     History taken from: patient chart RN    Objective        Vital Signs  Temp:  [98.7 °F (37.1 °C)] 98.7 °F (37.1 °C)  Heart Rate:  [69-92] 75  BP: ()/(50-67) 99/55     Physical Exam:    AA&O x 3. Conversant and appropriate.   Face symmetric.   L old drain site and cranial incision are both approximated; sutures intact. No redness, swelling or drainage.   Follows commands x 4 extremities.   No drift. No dysmetria.      Results Review:     I reviewed the patient's new clinical results.     CT HEAD WO CONTRAST 8/9/2021 2121    L subdural drain has been removed. No significant changes since prior CT yesterday.     .  Results from last 7 days   Lab Units 08/10/21  0416 08/09/21  0413 08/08/21  0418   WBC 10*3/mm3 8.77 4.87 4.21   HEMOGLOBIN g/dL 11.4* 11.6* 11.2*   HEMATOCRIT % 35.1 34.1 33.8*   PLATELETS 10*3/mm3 206 239 225     .  Results from last 7 days   Lab Units 08/10/21  0416 08/09/21  0413 08/09/21  0413 08/08/21  0418 08/08/21  0418   SODIUM mmol/L 137  --  138  --  139   POTASSIUM mmol/L 4.2  --  5.0  --  4.2   CHLORIDE mmol/L 102  --  104  --  106   CO2 mmol/L 26.7  --  23.8  --  24.8   BUN " mg/dL 13  --  9  --  7*   CREATININE mg/dL 0.64  --  0.64  --  0.53*   GLUCOSE mg/dL 103*   < > 134*   < > 82   CALCIUM mg/dL 10.0  --  9.7  --  9.7    < > = values in this interval not displayed.       Assessment/Plan       ICH (intracerebral hemorrhage) (CMS/HCC)    Subdural hematoma (CMS/HCC)        Day 2 s/p left sided dusty hole for evacuation of subacute on chronic SDH      CT head stable. Clear for transfer out of ICU anytime.     Continue Keppra for a total of 7 days.    Mobilize - PT/OT    CT head in am    Gricelda Velazquez, APRN  08/10/21  09:31 EDT

## 2021-08-10 NOTE — PROGRESS NOTES
[facility]  [phone]  Sixto Hugo MD    Subjective:  LOS: 5    Chief Complaint:  weakness    Status post sub-dural drain. Doing better and appears more alert. No complaints of HA etc.    Objective   Vital Signs past 24hrs    Temp range: Temp (24hrs), Av.7 °F (37.1 °C), Min:98.7 °F (37.1 °C), Max:98.7 °F (37.1 °C)    BP range: BP: ()/(50-67) 111/57  Pulse range: Heart Rate:  [69-87] 73  Resp rate range:      Device (Oxygen Therapy): room air   Oxygen range:SpO2:  [91 %-100 %] 92 %      95.1 kg (209 lb 10.5 oz); Body mass index is 35.97 kg/m².    Intake/Output Summary (Last 24 hours) at 8/10/2021 1046  Last data filed at 8/10/2021 0417  Gross per 24 hour   Intake 502 ml   Output 230.25 ml   Net 271.75 ml       Physical Exam  Constitutional:       Appearance: She is obese.   HENT:      Head: Normocephalic.      Mouth/Throat:      Mouth: Mucous membranes are moist.   Eyes:      Pupils: Pupils are equal, round, and reactive to light.   Cardiovascular:      Rate and Rhythm: Normal rate and regular rhythm.      Heart sounds: No murmur heard.        Comments: paced  Pulmonary:      Effort: Pulmonary effort is normal.      Breath sounds: Normal breath sounds.   Abdominal:      General: Bowel sounds are normal.      Palpations: Abdomen is soft. There is no mass.      Tenderness: There is no abdominal tenderness.   Musculoskeletal:      Cervical back: Neck supple.   Neurological:      Mental Status: She is alert.       Results Review:    I have reviewed the laboratory and imaging data since the last note by [facility] physician.  My annotations are noted in assessment and plan.    Medication Review:  I have reviewed the current MAR.  My annotations are noted in assessment and plan.       Plan   PCCM Problems  SDH, subacute and chronic  Cerebral compression with midline shift  Relevant Medical Diagnoses  Afib on AC, reversed  Recent GIB  SSS with pacer  Ex-smoker  COPD  dCHF  MGUS  HUGO        Plan of  Treatment    Improved. Weak yesterday. Now better.    BP controlled - not on drips. On Keppra po.    Consult Cards outpatient for Afib decision on AC.    Transfer out of ICU.    Sixto Hugo MD  08/10/21  10:46 EDT    While in the room and during my examination of the patient I wore gloves, gown, mask, eye protection and followed enhanced droplet/contact isolation protocol and precautions.  I washed my hands before and after this patient encounter.    Part of this note may be an electronic transcription/translation of spoken language to printed text using the Dragon Dictation System.

## 2021-08-11 ENCOUNTER — APPOINTMENT (OUTPATIENT)
Dept: CT IMAGING | Facility: HOSPITAL | Age: 86
End: 2021-08-11

## 2021-08-11 LAB
ALBUMIN SERPL-MCNC: 2.8 G/DL (ref 3.5–5.2)
ANION GAP SERPL CALCULATED.3IONS-SCNC: 6.7 MMOL/L (ref 5–15)
BUN SERPL-MCNC: 9 MG/DL (ref 8–23)
BUN/CREAT SERPL: 22.5 (ref 7–25)
CALCIUM SPEC-SCNC: 10 MG/DL (ref 8.6–10.5)
CHLORIDE SERPL-SCNC: 101 MMOL/L (ref 98–107)
CO2 SERPL-SCNC: 26.3 MMOL/L (ref 22–29)
CREAT SERPL-MCNC: 0.4 MG/DL (ref 0.57–1)
DEPRECATED RDW RBC AUTO: 44.4 FL (ref 37–54)
ERYTHROCYTE [DISTWIDTH] IN BLOOD BY AUTOMATED COUNT: 13.2 % (ref 12.3–15.4)
GFR SERPL CREATININE-BSD FRML MDRD: >150 ML/MIN/1.73
GLUCOSE SERPL-MCNC: 71 MG/DL (ref 65–99)
HCT VFR BLD AUTO: 31.7 % (ref 34–46.6)
HGB BLD-MCNC: 10.7 G/DL (ref 12–15.9)
MCH RBC QN AUTO: 31.6 PG (ref 26.6–33)
MCHC RBC AUTO-ENTMCNC: 33.8 G/DL (ref 31.5–35.7)
MCV RBC AUTO: 93.5 FL (ref 79–97)
PHOSPHATE SERPL-MCNC: 1.7 MG/DL (ref 2.5–4.5)
PLATELET # BLD AUTO: 203 10*3/MM3 (ref 140–450)
PMV BLD AUTO: 9.2 FL (ref 6–12)
POTASSIUM SERPL-SCNC: 4.5 MMOL/L (ref 3.5–5.2)
RBC # BLD AUTO: 3.39 10*6/MM3 (ref 3.77–5.28)
SODIUM SERPL-SCNC: 134 MMOL/L (ref 136–145)
WBC # BLD AUTO: 8.39 10*3/MM3 (ref 3.4–10.8)

## 2021-08-11 PROCEDURE — 99024 POSTOP FOLLOW-UP VISIT: CPT | Performed by: NURSE PRACTITIONER

## 2021-08-11 PROCEDURE — 70450 CT HEAD/BRAIN W/O DYE: CPT

## 2021-08-11 PROCEDURE — 93010 ELECTROCARDIOGRAM REPORT: CPT | Performed by: INTERNAL MEDICINE

## 2021-08-11 PROCEDURE — 85027 COMPLETE CBC AUTOMATED: CPT | Performed by: INTERNAL MEDICINE

## 2021-08-11 PROCEDURE — 80069 RENAL FUNCTION PANEL: CPT | Performed by: INTERNAL MEDICINE

## 2021-08-11 PROCEDURE — 93005 ELECTROCARDIOGRAM TRACING: CPT | Performed by: INTERNAL MEDICINE

## 2021-08-11 RX ORDER — NITROGLYCERIN 0.4 MG/1
0.4 TABLET SUBLINGUAL
Status: DISCONTINUED | OUTPATIENT
Start: 2021-08-11 | End: 2021-08-14 | Stop reason: HOSPADM

## 2021-08-11 RX ADMIN — SODIUM CHLORIDE, PRESERVATIVE FREE 10 ML: 5 INJECTION INTRAVENOUS at 09:16

## 2021-08-11 RX ADMIN — HYDROCODONE BITARTRATE AND ACETAMINOPHEN 1 TABLET: 10; 325 TABLET ORAL at 14:32

## 2021-08-11 RX ADMIN — LEVETIRACETAM 500 MG: 500 TABLET, FILM COATED ORAL at 22:59

## 2021-08-11 RX ADMIN — LEVETIRACETAM 500 MG: 500 TABLET, FILM COATED ORAL at 09:16

## 2021-08-11 RX ADMIN — HYDROCODONE BITARTRATE AND ACETAMINOPHEN 1 TABLET: 10; 325 TABLET ORAL at 22:59

## 2021-08-11 RX ADMIN — SODIUM CHLORIDE, PRESERVATIVE FREE 10 ML: 5 INJECTION INTRAVENOUS at 22:59

## 2021-08-11 NOTE — PROGRESS NOTES
[facility]  [phone]  Sixto Hugo MD    Subjective:  LOS: 6    Chief Complaint:  weakness    She is still feeling weak.  Not seen by PT today.  Unsure if she is able to get out of bed.  Was fatigued after she got cleaned up.  Therefore on low-flow oxygen.  Currently eating dinner.    Objective   Vital Signs past 24hrs    Temp range: Temp (24hrs), Av.1 °F (36.7 °C), Min:97.5 °F (36.4 °C), Max:98.9 °F (37.2 °C)    BP range: BP: (110-126)/(55-70) 110/58  Pulse range: Heart Rate:  [70-84] 76  Resp rate range: Resp:  [18] 18    Device (Oxygen Therapy): nasal cannulaFlow (L/min):  [2.5] 2.5  Oxygen range:SpO2:  [91 %-97 %] 97 %      95.1 kg (209 lb 10.5 oz); Body mass index is 35.97 kg/m².    Intake/Output Summary (Last 24 hours) at 2021 181  Last data filed at 8/10/2021 2000  Gross per 24 hour   Intake 120 ml   Output --   Net 120 ml       Physical Exam  Constitutional:       Appearance: She is obese.   HENT:      Head: Normocephalic.      Mouth/Throat:      Mouth: Mucous membranes are moist.   Eyes:      Pupils: Pupils are equal, round, and reactive to light.   Cardiovascular:      Rate and Rhythm: Normal rate and regular rhythm.      Heart sounds: No murmur heard.        Comments: paced  Pulmonary:      Effort: Pulmonary effort is normal.      Breath sounds: Normal breath sounds.   Abdominal:      General: Bowel sounds are normal.      Palpations: Abdomen is soft. There is no mass.      Tenderness: There is no abdominal tenderness.   Musculoskeletal:      Cervical back: Neck supple.   Neurological:      Mental Status: She is alert.       Results Review:    I have reviewed the laboratory and imaging data since the last note by [facility] physician.  My annotations are noted in assessment and plan.    Medication Review:  I have reviewed the current MAR.  My annotations are noted in assessment and plan.       Plan   PCCM Problems  SDH, subacute and chronic  Cerebral compression with midline  shift  Relevant Medical Diagnoses  Afib on AC, reversed  Recent GIB  SSS with pacer  Ex-smoker  COPD  dCHF  MGUS  HUGO        Plan of Treatment    Overall better after drainage of subdural hemorrhage.  Unclear why no PT evaluation today.  Daughter would like to take patient home but it does not appear to me that the patient is presently safe to do so.    BP controlled - not on drips. On Keppra po.    Consult [facility] outpatient for Afib decision on AC.    Plan is home with home health once safe to do so.    Sixto Hugo MD  08/11/21  18:11 EDT    While in the room and during my examination of the patient I wore gloves, gown, mask, eye protection and followed enhanced droplet/contact isolation protocol and precautions.  I washed my hands before and after this patient encounter.    Part of this note may be an electronic transcription/translation of spoken language to printed text using the Dragon Dictation System.

## 2021-08-11 NOTE — PROGRESS NOTES
"NEUROSURGERY PROGRESS NOTE     LOS: 6 days   Patient Care Team:  Mega Esparza MD as PCP - General (Family Medicine)  Micaela English MD as Consulting Physician (Gastroenterology)  Mayr Grace Culp MD as Consulting Physician (Cardiology)  Jp Krause Jr., MD as Consulting Physician (Hematology and Oncology)  Yasmene Pichardo [facility] as Pharmacist  Micaela English MD as Referring Physician (Gastroenterology)  Devon Valadez MD as Consulting Physician (Hematology and Oncology)  Rusty Interiano, JamarD as Pharmacist (Pharmacy)    Chief Complaint:  F/U after dusty hole.    Subjective     Interval History: Good sitting balance and progressing with standing tolerance with PT/OT yesterday.  Today reports feeling \"worn out.\"  Denies headache.  Denies any visual difficulty.    History taken from: patient chart    Objective      Vital Signs  Temp:  [97.5 °F (36.4 °C)-98.7 °F (37.1 °C)] 98.1 °F (36.7 °C)  Heart Rate:  [] 80  Resp:  [18] 18  BP: (103-174)/(54-92) 117/67     Physical Exam:     AA&O x 3. PERRL. EOM's intact.  Face symmetric.   L dusty hole incision approximated with suture intact.   Follows commands x 4 extremities.        Results Review:     I reviewed the patient's new clinical results.  I reviewed the patient's new imaging results and agree with the interpretation.  Discussed with Dr. Callaway.     CT HEAD WITHOUT CONTRAST 8/11/21    S/P 2 left lateral parietal dusty holes. Mixed attenuation subdural collections to bilateral hemispheres are stable as is the post op pneumocephalus. No significant interval change noted.     .  Results from last 7 days   Lab Units 08/11/21  0620 08/10/21  0416 08/09/21  0413   WBC 10*3/mm3 8.39 8.77 4.87   HEMOGLOBIN g/dL 10.7* 11.4* 11.6*   HEMATOCRIT % 31.7* 35.1 34.1   PLATELETS 10*3/mm3 203 206 239     .  Results from last 7 days   Lab Units 08/11/21  0620 08/10/21  0416 08/10/21  0416 08/09/21  0413 08/09/21  0413   SODIUM mmol/L 134*  --  137  --  138   POTASSIUM mmol/L " 4.5  --  4.2  --  5.0   CHLORIDE mmol/L 101  --  102  --  104   CO2 mmol/L 26.3  --  26.7  --  23.8   BUN mg/dL 9  --  13  --  9   CREATININE mg/dL 0.40*  --  0.64  --  0.64   GLUCOSE mg/dL 71   < > 103*   < > 134*   CALCIUM mg/dL 10.0  --  10.0  --  9.7    < > = values in this interval not displayed.       Assessment/Plan       ICH (intracerebral hemorrhage) (CMS/HCC)    Subdural hematoma (CMS/HCC)       3 days S/P left sided dusty hole for evacuation of subacute on chronic SDH    Continue holding coumadin. Patient will be seen in office in 10-12 days with repeat head CT. Resuming coumadin will be considered at that time.  Continue to mobilize.   Spoke with daughter - she and remainder of family want to take patient home with home health. They have already been taking turns staying with her around the clock and would prefer to continue with that.   OK for discharge anytime from [facility] standpoint.  Please call for any questions or concerns.      Gricelda Velazquez, SHAHID  08/11/21  11:25 EDT

## 2021-08-11 NOTE — DISCHARGE PLACEMENT REQUEST
"Paloma Villeda (86 y.o. Female)     Date of Birth Social Security Number Address [place] Phone MRN    [id number]  [id number] [facility] [address] [phone] [id number]    Holiness Marital Status          Buddhist        Admission Date Admission Type Admitting Provider Attending Provider Department, Room/Bed    8/5/21 Emergency Bella Vasquez MD Saad, Lebnan S, MD [address], P583/1    Discharge Date Discharge Disposition Discharge Destination                       Attending Provider: Bella Vasquez MD    Allergies: Codeine, Amitriptyline, Amoxicillin-pot Clavulanate, Aspirin, Bactrim [Sulfamethoxazole-trimethoprim], Carisoprodol-aspirin-codeine, Iodinated Diagnostic Agents, Latex, Naproxen, Nsaids, Soma Compound With Codeine [Carisoprodol-aspirin-codeine], Sulfa Antibiotics, Tramadol    Isolation: None   Infection: None   Code Status: CPR    Ht: 162.6 cm (64.02\")   Wt: 95.1 kg (209 lb 10.5 oz)    Admission Cmt: None   Principal Problem: None                Active Insurance as of 8/5/2021     Primary Coverage     Payor Plan Insurance Group Employer/Plan Group    HUMANA MEDICARE REPLACEMENT HUMANA MEDICARE REPLACEMENT [id number]     Payor Plan Address Payor Plan Phone Number Payor Plan Fax Number Effective Dates    [address] [address] [phone]  1/1/2018 - None Entered    [facility] [id number]       Subscriber Name Subscriber Birth Date Member ID       PALOMA VILLEDA 1935 [id number]           Secondary Coverage     Payor Plan Insurance Group Employer/Plan Group    [place]      Payor Plan Address Payor Plan Phone Number Payor Plan Fax Number Effective Dates    [address] [phone]  5/3/2021 - None Entered    [facility] [id number]       Subscriber Name Subscriber Birth Date Member ID       PALOMA VILLEDA 1935 [id number]                 Emergency Contacts      (Rel.) Home Phone Work Phone Mobile Phone    " Amina Diana (Daughter) [phone] -- --    Narda Cam (Daughter) -- -- [phone]    Fatmata Duran (Daughter) [phone] -- --    ADAN CAPPS (Son) -- -- [phone]

## 2021-08-11 NOTE — PLAN OF CARE
Goal Outcome Evaluation:              Outcome Summary: Pt admitted to unit from ICU for slurred speech, headach, SDH, left facial droop.  Symmetrical facial features noted upon admission.  Pt presents conscious, alert, oriented, with weakness.  Pt's family at bedside.  Pt has recent surgical scar left cranial region (no redness, discharge noted).  Pt denies pain, discomfort, SOA at the time.  Pt slept for majority of shift with no change / incident noted.  Will continue to monitor.

## 2021-08-11 NOTE — PLAN OF CARE
Goal Outcome Evaluation:  Plan of Care Reviewed With: patient, family        Progress: no change  Outcome Summary: SLP continuing to follow s/p VFSS completed 8/7/21 - placed on regular diet/thin liquids at that time. With attempted follow up/re-evaluation today, pt sleeping soundly, family present at bedside, requests to not wake pt. Family endorses no difficulties with diet, good intake. SLP is available for further intervention as indicated.    Recommendations consistent from VFSS: regular and thin liquid diet. Medications: whole with thin liquids or in puree as tolerated by pt. Compensations: small bites/sips, single sips, upright for all PO, single sips, alternate liquids/solids, dry swallow as needed.      SLP will follow for dysphagia therapy, carryover of swallow compensations and strategies. If pt is unable to moderate liquid bolus/sip size, or in setting of negative pulmonary or respiratory changes, could consider downgrade to nectar thick liquids - however, all penetrate material was ejected from the airway under limited fluoroscopy trials.

## 2021-08-11 NOTE — PROGRESS NOTES
Continued Stay Note  [facility]     Patient Name: Brittany Villeda  MRN: [id number]  Today's Date: 8/11/2021    Admit Date: 8/5/2021    Discharge Plan     Row Name 08/11/21 1239       Plan    Plan  Home with family and [facility]    Patient/Family in Agreement with Plan  yes    Plan Comments  CCP followed up with pt and daughter at bedside to discuss d/c planning. Daughter confirms pt is current with [facility] and will resume services at d/c. Pt's son lives with her and her other children all check in on her regularly throughout the day to assist as needed. Daughter declines SNF referrals. Daughter states pt has all the DME she needs. CCP to follow for additional needs. Gifty Zaidi LCSW        Discharge Codes    No documentation.             Elaine Zaidi LCSW

## 2021-08-12 ENCOUNTER — APPOINTMENT (OUTPATIENT)
Dept: GENERAL RADIOLOGY | Facility: HOSPITAL | Age: 86
End: 2021-08-12

## 2021-08-12 DIAGNOSIS — I62.00 SUBDURAL HEMORRHAGE (HCC): Primary | ICD-10-CM

## 2021-08-12 LAB
ALBUMIN SERPL-MCNC: 2.7 G/DL (ref 3.5–5.2)
ANION GAP SERPL CALCULATED.3IONS-SCNC: 6.6 MMOL/L (ref 5–15)
ANION GAP SERPL CALCULATED.3IONS-SCNC: 6.6 MMOL/L (ref 5–15)
BUN SERPL-MCNC: 10 MG/DL (ref 8–23)
BUN SERPL-MCNC: 11 MG/DL (ref 8–23)
BUN/CREAT SERPL: 20.4 (ref 7–25)
BUN/CREAT SERPL: 22.2 (ref 7–25)
CALCIUM SPEC-SCNC: 10 MG/DL (ref 8.6–10.5)
CALCIUM SPEC-SCNC: 10.3 MG/DL (ref 8.6–10.5)
CHLORIDE SERPL-SCNC: 100 MMOL/L (ref 98–107)
CHLORIDE SERPL-SCNC: 99 MMOL/L (ref 98–107)
CO2 SERPL-SCNC: 25.4 MMOL/L (ref 22–29)
CO2 SERPL-SCNC: 25.4 MMOL/L (ref 22–29)
CREAT SERPL-MCNC: 0.45 MG/DL (ref 0.57–1)
CREAT SERPL-MCNC: 0.54 MG/DL (ref 0.57–1)
GFR SERPL CREATININE-BSD FRML MDRD: 130 ML/MIN/1.73
GFR SERPL CREATININE-BSD FRML MDRD: >150 ML/MIN/1.73
GLUCOSE SERPL-MCNC: 107 MG/DL (ref 65–99)
GLUCOSE SERPL-MCNC: 93 MG/DL (ref 65–99)
MAGNESIUM SERPL-MCNC: 2.1 MG/DL (ref 1.6–2.4)
PHOSPHATE SERPL-MCNC: 1.8 MG/DL (ref 2.5–4.5)
PHOSPHATE SERPL-MCNC: 2 MG/DL (ref 2.5–4.5)
PHOSPHATE SERPL-MCNC: 2.1 MG/DL (ref 2.5–4.5)
POTASSIUM SERPL-SCNC: 4.6 MMOL/L (ref 3.5–5.2)
POTASSIUM SERPL-SCNC: 4.9 MMOL/L (ref 3.5–5.2)
QT INTERVAL: 402 MS
SODIUM SERPL-SCNC: 131 MMOL/L (ref 136–145)
SODIUM SERPL-SCNC: 132 MMOL/L (ref 136–145)

## 2021-08-12 PROCEDURE — 84100 ASSAY OF PHOSPHORUS: CPT | Performed by: INTERNAL MEDICINE

## 2021-08-12 PROCEDURE — 97530 THERAPEUTIC ACTIVITIES: CPT

## 2021-08-12 PROCEDURE — 80069 RENAL FUNCTION PANEL: CPT | Performed by: INTERNAL MEDICINE

## 2021-08-12 PROCEDURE — 71045 X-RAY EXAM CHEST 1 VIEW: CPT

## 2021-08-12 PROCEDURE — 83735 ASSAY OF MAGNESIUM: CPT | Performed by: INTERNAL MEDICINE

## 2021-08-12 PROCEDURE — 80048 BASIC METABOLIC PNL TOTAL CA: CPT | Performed by: INTERNAL MEDICINE

## 2021-08-12 PROCEDURE — 94799 UNLISTED PULMONARY SVC/PX: CPT

## 2021-08-12 RX ORDER — PANTOPRAZOLE SODIUM 40 MG/1
40 TABLET, DELAYED RELEASE ORAL
Status: DISCONTINUED | OUTPATIENT
Start: 2021-08-13 | End: 2021-08-14 | Stop reason: HOSPADM

## 2021-08-12 RX ADMIN — SODIUM CHLORIDE, PRESERVATIVE FREE 10 ML: 5 INJECTION INTRAVENOUS at 20:37

## 2021-08-12 RX ADMIN — Medication 2 PACKET: at 11:26

## 2021-08-12 RX ADMIN — SODIUM CHLORIDE, PRESERVATIVE FREE 10 ML: 5 INJECTION INTRAVENOUS at 08:43

## 2021-08-12 RX ADMIN — LEVETIRACETAM 500 MG: 500 TABLET, FILM COATED ORAL at 08:43

## 2021-08-12 RX ADMIN — SODIUM CHLORIDE 1000 ML: 9 INJECTION, SOLUTION INTRAVENOUS at 14:42

## 2021-08-12 RX ADMIN — LEVETIRACETAM 500 MG: 500 TABLET, FILM COATED ORAL at 20:37

## 2021-08-12 NOTE — PROGRESS NOTES
Continued Stay Note  [facility]     Patient Name: Brittany Villeda  MRN: [id number]  Today's Date: 8/12/2021    Admit Date: 8/5/2021    Discharge Plan     Row Name 08/12/21 1412       Plan    Plan  Home with family and [facility] via [facility] tentatively scheduled tomorrow, 8/13, at 2:00 PM in case of d/c    Patient/Family in Agreement with Plan  yes    Plan Comments  CCP followed up with pt and family at bedside who confirm plan remains for pt to return home with care via her children and continued [facility] services. Pt's family decline rehab placement. Pt will require EMS transport home due to immobility. [facility] tentatively scheduled tomorrow, 8/13, at 2:00 PM in case of d/c. Pt has a ramp to enter her home. Gifty Zaidi LCSW        Discharge Codes    No documentation.       Expected Discharge Date and Time     Expected Discharge Date Expected Discharge Time    Aug 13, 2021             Elaine Zaidi LCSW

## 2021-08-12 NOTE — NURSING NOTE
CWON consult received. Patient with a stage 2 pressure injury to coccyx, that was present on admission. Wound measures 2.0 cm x 1.0 cm x 0.2 cm. Wound base pink and moist. Stable. Family at bedside states that they have been using silver hydrofiber and a foam dressing. This is appropriate for her wound and CWON will recommend to continue this wound care as well as turn protocol and positioning off wound when possible. Please orders.    CWON performed wound care and applied dsg to sacral wound. Pt tolerated well.    Please call with any questions.

## 2021-08-12 NOTE — PLAN OF CARE
VSS. No neuro changes this shift. MD notified of low urine output, low phosphorus, diminished lung sounds, and oxygen requirements last night. Chest xray, morning labs, and electrolyte replacement ordered. Dr. Nieves said to follow electrolyte replacement after morning labs completed today. No other changes in assessment.       Problem: Adult Inpatient Plan of Care  Goal: Plan of Care Review  Outcome: Ongoing, Progressing  Flowsheets (Taken 8/12/2021 0939)  Plan of Care Reviewed With:   patient   spouse  Goal: Patient-Specific Goal (Individualized)  Outcome: Ongoing, Progressing  Goal: Absence of Hospital-Acquired Illness or Injury  Outcome: Ongoing, Progressing  Intervention: Identify and Manage Fall Risk  Recent Flowsheet Documentation  Taken 8/12/2021 0612 by Carolina Hebert RN  Safety Promotion/Fall Prevention:   assistive device/personal items within reach   clutter free environment maintained   fall prevention program maintained   nonskid shoes/slippers when out of bed   safety round/check completed  Taken 8/12/2021 0417 by Carolina Hebert RN  Safety Promotion/Fall Prevention:   assistive device/personal items within reach   clutter free environment maintained   fall prevention program maintained   safety round/check completed  Taken 8/12/2021 0223 by Carolina Hebert RN  Safety Promotion/Fall Prevention:   assistive device/personal items within reach   clutter free environment maintained   fall prevention program maintained   nonskid shoes/slippers when out of bed   safety round/check completed  Taken 8/12/2021 0036 by Carolina Hebert, RN  Safety Promotion/Fall Prevention:   assistive device/personal items within reach   clutter free environment maintained   fall prevention program maintained   safety round/check completed  Taken 8/11/2021 2259 by Carolina Hebert RN  Safety Promotion/Fall Prevention:   assistive device/personal items within reach   clutter free environment maintained   fall prevention  program maintained   safety round/check completed  Taken 8/11/2021 2025 by Carolina Hebert RN  Safety Promotion/Fall Prevention:   assistive device/personal items within reach   clutter free environment maintained   fall prevention program maintained   safety round/check completed  Intervention: Prevent Skin Injury  Recent Flowsheet Documentation  Taken 8/12/2021 0612 by Carolina Hebert RN  Body Position:   weight shift assistance provided   supine, legs elevated  Taken 8/12/2021 0417 by Carolina Hebert RN  Body Position: position maintained  Taken 8/12/2021 0223 by Carolina Hebert RN  Body Position: position maintained  Taken 8/12/2021 0036 by Carolina Hebert RN  Body Position: position maintained  Taken 8/11/2021 2259 by Carolina Hebert RN  Body Position:   weight shift assistance provided   tilted, left  Skin Protection: incontinence pads utilized  Taken 8/11/2021 2025 by Carolina Hebert RN  Body Position: supine, legs elevated  Goal: Optimal Comfort and Wellbeing  Outcome: Ongoing, Progressing  Intervention: Provide Person-Centered Care  Recent Flowsheet Documentation  Taken 8/11/2021 2259 by Carolina Hebert RN  Trust Relationship/Rapport: care explained  Goal: Readiness for Transition of Care  Outcome: Ongoing, Progressing     Problem: Fall Injury Risk  Goal: Absence of Fall and Fall-Related Injury  Outcome: Ongoing, Progressing  Intervention: Identify and Manage Contributors to Fall Injury Risk  Recent Flowsheet Documentation  Taken 8/11/2021 2259 by Carolina Hebert RN  Medication Review/Management: medications reviewed  Intervention: Promote Injury-Free Environment  Recent Flowsheet Documentation  Taken 8/12/2021 0612 by Carolina Hebert RN  Safety Promotion/Fall Prevention:   assistive device/personal items within reach   clutter free environment maintained   fall prevention program maintained   nonskid shoes/slippers when out of bed   safety round/check completed  Taken 8/12/2021 0417 by Anup  CORRINE Figueroa  Safety Promotion/Fall Prevention:   assistive device/personal items within reach   clutter free environment maintained   fall prevention program maintained   safety round/check completed  Taken 8/12/2021 0223 by Carolina Hebert RN  Safety Promotion/Fall Prevention:   assistive device/personal items within reach   clutter free environment maintained   fall prevention program maintained   nonskid shoes/slippers when out of bed   safety round/check completed  Taken 8/12/2021 0036 by Carolina Hebert RN  Safety Promotion/Fall Prevention:   assistive device/personal items within reach   clutter free environment maintained   fall prevention program maintained   safety round/check completed  Taken 8/11/2021 2259 by Carolina Hebert RN  Safety Promotion/Fall Prevention:   assistive device/personal items within reach   clutter free environment maintained   fall prevention program maintained   safety round/check completed  Taken 8/11/2021 2025 by Carolina Hebert RN  Safety Promotion/Fall Prevention:   assistive device/personal items within reach   clutter free environment maintained   fall prevention program maintained   safety round/check completed     Problem: Skin Injury Risk Increased  Goal: Skin Health and Integrity  Outcome: Ongoing, Progressing  Intervention: Optimize Skin Protection  Recent Flowsheet Documentation  Taken 8/12/2021 0612 by Carolnia Hebert RN  Head of Bed (HOB): HOB elevated  Taken 8/12/2021 0417 by Carolina Hebert RN  Head of Bed (HOB): HOB elevated  Taken 8/12/2021 0223 by Carolina Hebert RN  Head of Bed (HOB): HOB elevated  Taken 8/12/2021 0036 by Carolina Hebert RN  Head of Bed (HOB): HOB elevated  Taken 8/11/2021 2259 by Carolina Hebert RN  Pressure Reduction Techniques:   sit time limited to 2 hours   weight shift assistance provided  Head of Bed (HOB): HOB elevated  Pressure Reduction Devices:   alternating pressure pump (ADD)   pressure-redistributing mattress utilized  Skin  Protection: incontinence pads utilized  Taken 8/11/2021 2025 by Carolina Hebert RN  Head of Bed (HOB): HOB elevated     Problem: Asthma Comorbidity  Goal: Maintenance of Asthma Control  Outcome: Ongoing, Progressing  Intervention: Maintain Asthma Symptom Control  Recent Flowsheet Documentation  Taken 8/11/2021 2259 by Carolina Hebert RN  Medication Review/Management: medications reviewed     Problem: COPD Comorbidity  Goal: Maintenance of COPD Symptom Control  Outcome: Ongoing, Progressing  Intervention: Maintain COPD-Symptom Control  Recent Flowsheet Documentation  Taken 8/11/2021 2259 by Carolina Hebert RN  Medication Review/Management: medications reviewed     Problem: Diabetes Comorbidity  Goal: Blood Glucose Level Within Desired Range  Outcome: Ongoing, Progressing     Problem: Heart Failure Comorbidity  Goal: Maintenance of Heart Failure Symptom Control  Outcome: Ongoing, Progressing  Intervention: Maintain Heart Failure-Management Strategies  Recent Flowsheet Documentation  Taken 8/11/2021 2259 by Carolina Hebert RN  Medication Review/Management: medications reviewed     Problem: Hypertension Comorbidity  Goal: Blood Pressure in Desired Range  Outcome: Ongoing, Progressing  Intervention: Maintain Hypertension-Management Strategies  Recent Flowsheet Documentation  Taken 8/11/2021 2259 by Carolina Hebert RN  Medication Review/Management: medications reviewed     Problem: Obstructive Sleep Apnea Risk or Actual (Comorbidity Management)  Goal: Unobstructed Breathing During Sleep  Outcome: Ongoing, Progressing     Problem: Pain Chronic (Persistent) (Comorbidity Management)  Goal: Acceptable Pain Control and Functional Ability  Outcome: Ongoing, Progressing  Intervention: Develop Pain Management Plan  Recent Flowsheet Documentation  Taken 8/11/2021 2259 by Carolina Hebert RN  Pain Management Interventions: see MAR  Intervention: Manage Persistent Pain  Recent Flowsheet Documentation  Taken 8/11/2021 2259 by  Carolina Hebert, RN  Medication Review/Management: medications reviewed  Intervention: Optimize Psychosocial Wellbeing  Recent Flowsheet Documentation  Taken 8/11/2021 2253 by Carolina Hebert, RN  Diversional Activities: television     Problem: Seizure Disorder Comorbidity  Goal: Maintenance of Seizure Control  Outcome: Ongoing, Progressing   Goal Outcome Evaluation:  Plan of Care Reviewed With: patient, spouse

## 2021-08-12 NOTE — PROGRESS NOTES
[facility]  [phone]  Shaun Carlisle MD    Subjective:  LOS: 7    Chief Complaint:  weakness    Very weak, requires significant assist. Very tired as well. No F/C, pain controlled. Moving bowels.     Objective   Vital Signs past 24hrs    Temp range: Temp (24hrs), Av.1 °F (36.7 °C), Min:97.6 °F (36.4 °C), Max:98.9 °F (37.2 °C)    BP range: BP: (100-125)/(58-68) 119/68  Pulse range: Heart Rate:  [76-84] 83  Resp rate range: Resp:  [18] 18    Device (Oxygen Therapy): room airFlow (L/min):  [2-2.5] 2  Oxygen range:SpO2:  [93 %-100 %] 93 %      95.1 kg (209 lb 10.5 oz); Body mass index is 35.97 kg/m².    Intake/Output Summary (Last 24 hours) at 2021 1022  Last data filed at 2021 0443  Gross per 24 hour   Intake 1200 ml   Output 150 ml   Net 1050 ml       Physical Exam  Constitutional:       Appearance: She is obese.   HENT:      Head: Normocephalic.      Mouth/Throat:      Mouth: Mucous membranes are moist.   Eyes:      Pupils: Pupils are equal, round, and reactive to light.   Cardiovascular:      Rate and Rhythm: Normal rate and regular rhythm.      Heart sounds: No murmur heard.        Comments: paced  Pulmonary:      Effort: Pulmonary effort is normal.      Breath sounds: Normal breath sounds.   Abdominal:      General: Bowel sounds are normal.      Palpations: Abdomen is soft. There is no mass.      Tenderness: There is no abdominal tenderness.   Musculoskeletal:      Cervical back: Neck supple.   Neurological:      Mental Status: She is alert.       Results Review:    I have reviewed the laboratory and imaging data since the last note by [facility] physician.  My annotations are noted in assessment and plan.    Medication Review:  I have reviewed the current MAR.  My annotations are noted in assessment and plan.       Plan   PCCM Problems  SDH, subacute and chronic  Cerebral compression with midline shift  Relevant Medical Diagnoses  Afib on AC, reversed  Recent GIB  SSS with  pacer  Ex-smoker  COPD  dCHF  MGUS  HUGO  Hypophosphatemia     Plan of Treatment  - PT to evaluate today, daughter wants to take the patient home, will determine plan of D/C after PT sees they would be interested in inpt rehab here.   - replacing phos and rechecking     - BP controlled not on any medications, On Keppra po.  - Will follow up with NSG in 10-12 days with CT head to determine whether to reinitiate Shaun CORREA MD  08/12/21  10:22 EDT

## 2021-08-12 NOTE — THERAPY TREATMENT NOTE
Patient Name: Brittany Villeda  : 1935    MRN: [id number]                              Today's Date: 2021       Admit Date: 2021    Visit Dx:     ICD-10-CM ICD-9-CM   1. Subdural hematoma (CMS/HCC)  S06.5X9A 432.1   2. SAH (subarachnoid hemorrhage) (CMS/HCC)  I60.9 430   3. 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)   • Other chest pain   • Lichen sclerosus   • Heart block   • Sleep-related hypoxia   • Bradycardia   • Shoulder arthritis   • History of atrial fibrillation   • Pacemaker   • Paroxysmal SVT (supraventricular tachycardia) (CMS/HCC)   • History of chest pain   • Palpitations   • Atrial fibrillation (CMS/HCC)   • Rectal bleeding   • Arthritis   • Ascending aortic aneurysm (CMS/HCC)   • Mediastinal lymphadenopathy   • Immobility   • Left knee pain   • Chronic anticoagulation   • Hemarthrosis of left knee   • Anemia   • Obesity (BMI 30-39.9)   • Generalized weakness   • Dysautonomia (CMS/HCC)   • Weakness of both lower extremities   • Macrocytosis   • Hypercalcemia   • Arthritis of right wrist   • Hyperparathyroidism (CMS/HCC)   • Choledocholithiasis   • Essential hypertension   • Hyperglycemia   • Epigastric pain   • Duodenal ulcer   • Hemorrhagic gastritis   • Exertional dyspnea   • COPD (chronic obstructive pulmonary disease) (CMS/HCC)   • Functional quadriplegia (CMS/HCC)   • Hip pain   • Osteoarthritis of glenohumeral joint   • Other malaise and fatigue   • Shoulder pain   • ICH  (intracerebral hemorrhage) (CMS/HCC)   • Subdural hematoma (CMS/HCC)     Past Medical History:   Diagnosis Date   • Acute UTI (urinary tract infection) 4/8/2021   • Anemia    • Arrhythmia    • Arthritis    • Asthma    • Bradycardia    • Chest pain    • Choledocholithiasis 5/9/2021   • Colitis    • COPD (chronic obstructive pulmonary disease) (CMS/HCC)    • Diastolic dysfunction    • Essential hypertension 5/12/2016   • GERD (gastroesophageal reflux disease)    • Heart block    • Hypertension    • Hypertensive heart disease    • Hyperthyroidism    • Kidney stone    • Leukopenia    • Low back pain    • MGUS (monoclonal gammopathy of unknown significance)    • Nephrolithiasis    • Obesity    • Paroxysmal atrial fibrillation (CMS/HCC)    • Peptic ulceration    • Persistent atrial fibrillation (CMS/HCC)    • PSVT (paroxysmal supraventricular tachycardia) (CMS/HCC)    • Pulmonary hypertension (CMS/HCC)    • Rectal bleed    • Sleep apnea    • Systemic hypertension    • Trifascicular block    • 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)    • Ventricular tachycardia (CMS/HCC)     nonsustained   • Vertigo      Past Surgical History:   Procedure Laterality Date   • BACK SURGERY      lumbar fusion   • DUSTY HOLE Left 8/8/2021    Procedure: Left-sided dusty holes for evacuation of subdural hematoma;  Surgeon: Gustavo Callaway MD;  [place] [facility] [place];  Service: Neurosurgery;  Laterality: Left;   • CARDIAC ELECTROPHYSIOLOGY PROCEDURE N/A 11/7/2018    Procedure: Pacemaker DC new   [place];  Surgeon: Jesus Granda MD;  [place] [facility] [place];  Service: Cardiology   • CHOLECYSTECTOMY     • COLONOSCOPY  06/16/2014    colitis, cryptitis,  tics, NBIH, TA w/low grade dysplasia   • COLONOSCOPY N/A 10/28/2019    Procedure: COLONOSCOPY WITH COLD AND HOT POLYPECTOMIES;  Surgeon: Micaela English MD;  Location[place] [facility] [place];  Service: Gastroenterology   • ENDOSCOPY N/A 5/13/2021     Procedure: ESOPHAGOGASTRODUODENOSCOPY with BX;  Surgeon: Stefan Mcfadden MD;  Location: [facility] ENDOSCOPY;  Service: Gastroenterology;  Laterality: N/A;  EPIGASTRIC PAIN  --DUODENAL ULCER, HEMORRHAGIC GASTRITIS, HIATAL HERNIA    • HEMORRHOIDECTOMY     • HYSTERECTOMY     • JOINT REPLACEMENT     • KNEE ARTHROPLASTY     • MYOMECTOMY     • PACEMAKER IMPLANTATION     • SHOULDER SURGERY     • SINUS SURGERY     • TOE NAIL AMPUTATION  03/04/2019   • TONSILLECTOMY       General Information     Row Name 08/12/21 1603          Physical Therapy Time and Intention    Document Type  therapy note (daily note)  -CF     Mode of Treatment  physical therapy;individual therapy  -CF     Row Name 08/12/21 1603          General Information    Patient Profile Reviewed  yes  -CF     Existing Precautions/Restrictions  fall  -CF     Row Name 08/12/21 1603          Cognition    Orientation Status (Cognition)  oriented to;person;unable/difficult to assess;other (see comments) pt with minimal verbal responses due to fatigue  -CF     Row Name 08/12/21 1603          Safety Issues, Functional Mobility    Safety Issues Affecting Function (Mobility)  awareness of need for assistance;sequencing abilities  -CF     Impairments Affecting Function (Mobility)  balance;strength;endurance/activity tolerance;postural/trunk control;range of motion (ROM);pain  -CF       User Key  (r) = Recorded By, (t) = Taken By, (c) = Cosigned By    Initials Name Provider Type    CF Faina Kahn PT Physical Therapist        Mobility     Row Name 08/12/21 1601          Bed Mobility    Bed Mobility  supine-sit;sit-supine;scooting/bridging  -CF     Scooting/Bridging [place] (Bed Mobility)  dependent (less than 25% patient effort);2 person assist;verbal cues;nonverbal cues (demo/gesture)  -CF     Supine-Sit [place] (Bed Mobility)  maximum assist (25% patient effort);1 person assist;verbal cues;nonverbal cues (demo/gesture)  -CF     Sit-Supine [place] (Bed  Mobility)  2 person assist;maximum assist (25% patient effort);nonverbal cues (demo/gesture);verbal cues  -CF     Comment (Bed Mobility)  Pt required min A intiialy at EOB, as pt fatigued required mod A x1 for sitting balance. Tolerated sitting up approx 8 mins total.  -     Row Name 08/12/21 1604          Transfers    Comment (Transfers)  Deferred as not appropriate this date. Pt with poor sitting  balance, unable to perform LAQ and overall very weak  -       User Key  (r) = Recorded By, (t) = Taken By, (c) = Cosigned By    Initials Name Provider Type     Faina Kahn PT Physical Therapist        Obj/Interventions     Row Name 08/12/21 1605          Motor Skills    Therapeutic Exercise  other (see comments) B ankle pumps, attempted laq at EOB, minimal quad activation noted  -       User Key  (r) = Recorded By, (t) = Taken By, (c) = Cosigned By    Initials Name Provider Type     Faina Kahn PT Physical Therapist        Goals/Plan    No documentation.       Clinical Impression     Row Name 08/12/21 1606          Pain    Additional Documentation  Pain Scale: FACES Pre/Post-Treatment (Group)  -[facility] Name 08/12/21 1606          Pain Scale: FACES Pre/Post-Treatment    Pain: FACES Scale, Pretreatment  4-->hurts little more  -CF     Posttreatment Pain Rating  6-->hurts even more  -     Pain Location - Orientation  generalized  -CF     Pain Location  extremity  -CF     Pre/Posttreatment Pain Comment  BUE and BLE pain with any mobility  -     Row Name 08/12/21 1607          Plan of Care Review    Plan of Care Reviewed With  patient;daughter  -     Outcome Summary  Pt seen for PT this afternoon. Pt asleep upon arrival and very fatigued/ grimacing in pain with any mobility. Pt required max A x1 to perform supine>sit. At EOB pt required min A intitially but then required mod A as she became more fatigued. Pt unable to hold her head up or use her BUE to support herself in sitting. Not  appropriate to attempt transfers this date. Daughter present and insistent on bringing pt home. Pt has hopsital bed, wheelchair, ramp to enter, walker, BSC. Discussed that pt is very weak and will require assist x2 for all needs at home. PT is still recommending d/c to SNF for strengthening but family seens determined to bring pt home. Will need HH services and 24/7 assist.  -CF     Row Name 08/12/21 1606          Vital Signs    O2 Delivery Pre Treatment  room air  -CF     Row Name 08/12/21 1606          Positioning and Restraints    Pre-Treatment Position  in bed  -CF     Post Treatment Position  bed  -CF     In Bed  call light within reach;fowlers;encouraged to call for assist;exit alarm on;with family/caregiver;side rails up x2;SCD pump applied;heels elevated;LUE elevated;RUE elevated  -CF       User Key  (r) = Recorded By, (t) = Taken By, (c) = Cosigned By    Initials Name Provider Type    Faina Damian PT Physical Therapist        Outcome Measures     Row Name 08/12/21 1609          How much help from another person do you currently need...    Turning from your back to your side while in flat bed without using bedrails?  2  -CF     Moving from lying on back to sitting on the side of a flat bed without bedrails?  2  -CF     Moving to and from a bed to a chair (including a wheelchair)?  1  -CF     Standing up from a chair using your arms (e.g., wheelchair, bedside chair)?  1  -CF     Climbing 3-5 steps with a railing?  1  -CF     To walk in hospital room?  1  -CF     AM-PAC 6 Clicks Score (PT)  8  -CF     Row Name 08/12/21 1609          Functional Assessment    Outcome Measure Options  AM-PAC 6 Clicks Basic Mobility (PT)  -CF       User Key  (r) = Recorded By, (t) = Taken By, (c) = Cosigned By    Initials Name Provider Type    Faina Damian PT Physical Therapist                       Physical Therapy Education                 Title: PT OT SLP Therapies (In Progress)     Topic: Physical Therapy (In  Progress)     Point: Mobility training (In Progress)     Learning Progress Summary           Patient Acceptance, E, NR by  at 8/12/2021 1610    Acceptance, E,TB,D, VU,NR by  at 8/6/2021 1134   Family Acceptance, E, NR by  at 8/12/2021 1610                   Point: Home exercise program (Not Started)     Learner Progress:  Not documented in this visit.          Point: Body mechanics (In Progress)     Learning Progress Summary           Patient Acceptance, E, NR by  at 8/12/2021 1610    Acceptance, E,TB,D, VU,NR by  at 8/6/2021 1134   Family Acceptance, E, NR by  at 8/12/2021 1610                   Point: Precautions (In Progress)     Learning Progress Summary           Patient Acceptance, E, NR by  at 8/12/2021 1610    Acceptance, E, VU by MD at 8/10/2021 1536    Acceptance, E,TB,D, VU,NR by  at 8/6/2021 1134   Family Acceptance, E, NR by  at 8/12/2021 1610                               User Key     Initials Effective Dates Name Provider Type Discipline     06/16/21 -  Heaven Acosta, PT Physical Therapist PT    MD 06/16/21 -  Janice Hopkins PT Physical Therapist PT     06/16/21 -  Faina Kahn PT Physical Therapist PT              PT Recommendation and Plan     Plan of Care Reviewed With: patient, daughter  Outcome Summary: Pt seen for PT this afternoon. Pt asleep upon arrival and very fatigued/ grimacing in pain with any mobility. Pt required max A x1 to perform supine>sit. At EOB pt required min A intitially but then required mod A as she became more fatigued. Pt unable to hold her head up or use her BUE to support herself in sitting. Not appropriate to attempt transfers this date. Daughter present and insistent on bringing pt home. Pt has hopsital bed, wheelchair, ramp to enter, walker, BSC. Discussed that pt is very weak and will require assist x2 for all needs at home. PT is still recommending d/c to SNF for strengthening but family seens determined to bring pt home. Will need HH  services and 24/7 assist.     Time Calculation:   PT Charges     Row Name 08/12/21 1611             Time Calculation    Start Time  1542  -CF      Stop Time  1600  -CF      Time Calculation (min)  18 min  -CF      PT Received On  08/12/21  -CF      PT - Next Appointment  08/13/21  -CF        User Key  (r) = Recorded By, (t) = Taken By, (c) = Cosigned By    Initials Name Provider Type    CF Faina Kahn, MARIA D Physical Therapist        Therapy Charges for Today     Code Description Service Date Service Provider Modifiers Qty    18728989143 HC PT THERAPEUTIC ACT EA 15 MIN 8/12/2021 Faina Kahn, PT GP 1    63860892535 HC PT THER SUPP EA 15 MIN 8/12/2021 Faina Kahn, PT GP 1          PT G-Codes  Outcome Measure Options: AM-PAC 6 Clicks Basic Mobility (PT)  AM-PAC 6 Clicks Score (PT): 8  AM-PAC 6 Clicks Score (OT): 12  Modified Miller Scale: 5 - Severe disability.  Bedridden, incontinent, and requiring constant nursing care and attention.    Faina Kahn PT  8/12/2021

## 2021-08-12 NOTE — SIGNIFICANT NOTE
08/12/21 1155   OTHER   Discipline physical therapist   Rehab Time/Intention   Session Not Performed patient/family declined treatment;other (see comments)  (Family declined PT session this AM as they wanted pt to eat and rest. Will check back later this afternoon if time allows.)

## 2021-08-13 LAB
ALBUMIN SERPL-MCNC: 2.7 G/DL (ref 3.5–5.2)
ANION GAP SERPL CALCULATED.3IONS-SCNC: 7.4 MMOL/L (ref 5–15)
BUN SERPL-MCNC: 9 MG/DL (ref 8–23)
BUN/CREAT SERPL: 18.8 (ref 7–25)
CALCIUM SPEC-SCNC: 9.7 MG/DL (ref 8.6–10.5)
CHLORIDE SERPL-SCNC: 99 MMOL/L (ref 98–107)
CO2 SERPL-SCNC: 23.6 MMOL/L (ref 22–29)
CREAT SERPL-MCNC: 0.48 MG/DL (ref 0.57–1)
DEPRECATED RDW RBC AUTO: 45.9 FL (ref 37–54)
ERYTHROCYTE [DISTWIDTH] IN BLOOD BY AUTOMATED COUNT: 12.9 % (ref 12.3–15.4)
GFR SERPL CREATININE-BSD FRML MDRD: 149 ML/MIN/1.73
GLUCOSE SERPL-MCNC: 92 MG/DL (ref 65–99)
HCT VFR BLD AUTO: 33.4 % (ref 34–46.6)
HGB BLD-MCNC: 11 G/DL (ref 12–15.9)
MCH RBC QN AUTO: 32.4 PG (ref 26.6–33)
MCHC RBC AUTO-ENTMCNC: 32.9 G/DL (ref 31.5–35.7)
MCV RBC AUTO: 98.2 FL (ref 79–97)
NT-PROBNP SERPL-MCNC: 2530 PG/ML (ref 0–1800)
OSMOLALITY UR: 344 MOSM/KG
PHOSPHATE SERPL-MCNC: 2 MG/DL (ref 2.5–4.5)
PLATELET # BLD AUTO: 219 10*3/MM3 (ref 140–450)
PMV BLD AUTO: 9.2 FL (ref 6–12)
POTASSIUM SERPL-SCNC: 4.8 MMOL/L (ref 3.5–5.2)
RBC # BLD AUTO: 3.4 10*6/MM3 (ref 3.77–5.28)
SODIUM SERPL-SCNC: 130 MMOL/L (ref 136–145)
SODIUM UR-SCNC: 140 MMOL/L
WBC # BLD AUTO: 6.88 10*3/MM3 (ref 3.4–10.8)

## 2021-08-13 PROCEDURE — 83935 ASSAY OF URINE OSMOLALITY: CPT | Performed by: INTERNAL MEDICINE

## 2021-08-13 PROCEDURE — 84300 ASSAY OF URINE SODIUM: CPT | Performed by: INTERNAL MEDICINE

## 2021-08-13 PROCEDURE — 85027 COMPLETE CBC AUTOMATED: CPT | Performed by: INTERNAL MEDICINE

## 2021-08-13 PROCEDURE — 83880 ASSAY OF NATRIURETIC PEPTIDE: CPT | Performed by: INTERNAL MEDICINE

## 2021-08-13 PROCEDURE — 80069 RENAL FUNCTION PANEL: CPT | Performed by: INTERNAL MEDICINE

## 2021-08-13 RX ADMIN — PANTOPRAZOLE SODIUM 40 MG: 40 TABLET, DELAYED RELEASE ORAL at 06:24

## 2021-08-13 RX ADMIN — SODIUM PHOSPHATE, MONOBASIC, MONOHYDRATE 40 MMOL: 276; 142 INJECTION, SOLUTION INTRAVENOUS at 13:09

## 2021-08-13 RX ADMIN — LEVETIRACETAM 500 MG: 500 TABLET, FILM COATED ORAL at 08:52

## 2021-08-13 RX ADMIN — LEVETIRACETAM 500 MG: 500 TABLET, FILM COATED ORAL at 21:11

## 2021-08-13 RX ADMIN — SODIUM CHLORIDE, PRESERVATIVE FREE 10 ML: 5 INJECTION INTRAVENOUS at 21:11

## 2021-08-13 RX ADMIN — HYDROCODONE BITARTRATE AND ACETAMINOPHEN 1 TABLET: 10; 325 TABLET ORAL at 21:11

## 2021-08-13 RX ADMIN — SODIUM CHLORIDE, PRESERVATIVE FREE 10 ML: 5 INJECTION INTRAVENOUS at 09:12

## 2021-08-13 NOTE — PROGRESS NOTES
[facility]  [phone]  Shaun Carlisle MD    Subjective:  LOS: 8    Chief Complaint:  weakness    Still very weak, but responsive, following commands, No F/C, n/v/d, pain controlled. Moving bowels.     Objective   Vital Signs past 24hrs,    Temp range: Temp (24hrs), Av.4 °F (36.9 °C), Min:97.9 °F (36.6 °C), Max:99.2 °F (37.3 °C)    BP range: BP: (115-128)/(61-80) 128/67  Pulse range: Heart Rate:  [73-91] 91  Resp rate range: Resp:  [16] 16    Device (Oxygen Therapy): room air   Oxygen range:SpO2:  [91 %-100 %] 99 %      95.1 kg (209 lb 10.5 oz); Body mass index is 35.97 kg/m².    Intake/Output Summary (Last 24 hours) at 2021 1037  Last data filed at 2021 0524  Gross per 24 hour   Intake --   Output 975 ml   Net -975 ml       Physical Exam  Constitutional:       Appearance: She is obese.   HENT:      Head: Normocephalic.      Mouth/Throat:      Mouth: Mucous membranes are moist.   Eyes:      Pupils: Pupils are equal, round, and reactive to light.   Cardiovascular:      Rate and Rhythm: Normal rate and regular rhythm.      Heart sounds: No murmur heard.        Comments: paced  Pulmonary:      Effort: Pulmonary effort is normal.      Breath sounds: Normal breath sounds.   Abdominal:      General: Bowel sounds are normal.      Palpations: Abdomen is soft. There is no mass.      Tenderness: There is no abdominal tenderness.   Musculoskeletal:      Cervical back: Neck supple.   Neurological:      Mental Status: She is alert.       Results Review:    I have reviewed the laboratory and imaging     Medication Review:  I have reviewed the current MAR.  My annotations are noted in assessment and plan.       Plan   SDH, subacute and chronic  Cerebral compression with midline shift (resolved)  Afib on AC, reversed  Recent GIB  SSS with pacer  Ex-smoker  COPD  dCHF  MGUS  HUGO  Hypophosphatemia     Plan of Treatment  - will hold off on D/C today with some significant electrolyte abnormalities,  worsening hyponatremia and significant phos depletions, will check urine Na, urine osms and replace as needed   - BP controlled not on any medications, On Keppra po.  - Will follow up with NSG in ~10 days with CT head to determine whether to reinitiate AC,       Shaun Carlisle MD  08/13/21  10:37 EDT

## 2021-08-13 NOTE — PLAN OF CARE
Goal Outcome Evaluation:      VSS. On room air even when sleeping. Worked IS with patient and only able to achieved 500 with fair effort. NIH score of 8. Denies any pain. Q2h turn and repositioning.

## 2021-08-13 NOTE — PLAN OF CARE
Problem: Adult Inpatient Plan of Care  Goal: Plan of Care Review  Outcome: Ongoing, Progressing  Flowsheets (Taken 8/13/2021 1705)  Progress: improving  Plan of Care Reviewed With: patient  Outcome Summary: VSS, A&O x4, turn q2h, poor appetite, encourage fluids, phosphorus to be rechecked 6 hrs after current infusion, urine specimens sent,  refused PT today, possible home with home health tomorrow. .  Goal: Patient-Specific Goal (Individualized)  Outcome: Ongoing, Progressing  Goal: Absence of Hospital-Acquired Illness or Injury  Outcome: Ongoing, Progressing  Intervention: Identify and Manage Fall Risk  Recent Flowsheet Documentation  Taken 8/13/2021 1200 by Nupur English RN  Safety Promotion/Fall Prevention:   activity supervised   assistive device/personal items within reach   clutter free environment maintained   fall prevention program maintained   nonskid shoes/slippers when out of bed   room organization consistent   safety round/check completed  Taken 8/13/2021 1034 by Nupur English RN  Safety Promotion/Fall Prevention:   clutter free environment maintained   assistive device/personal items within reach   activity supervised   fall prevention program maintained   nonskid shoes/slippers when out of bed   room organization consistent   safety round/check completed  Taken 8/13/2021 0900 by Nupur English RN  Safety Promotion/Fall Prevention:   assistive device/personal items within reach   activity supervised   clutter free environment maintained   fall prevention program maintained   nonskid shoes/slippers when out of bed   room organization consistent   safety round/check completed  Taken 8/13/2021 0715 by Nupur English RN  Safety Promotion/Fall Prevention:   clutter free environment maintained   assistive device/personal items within reach   activity supervised   fall prevention program maintained   nonskid shoes/slippers when out of bed   room organization consistent   safety  round/check completed  Intervention: Prevent Skin Injury  Recent Flowsheet Documentation  Taken 8/13/2021 1406 by Nupur English RN  Body Position:   turned   side-lying, left  Taken 8/13/2021 1200 by Nupur English RN  Body Position:   turned   side-lying, right   lower extremity elevated, right   lower extremity elevated, left   upper extremity elevated, right   upper extremity elevated, left  Taken 8/13/2021 1034 by Nupur English RN  Body Position: position maintained  Taken 8/13/2021 0900 by Nupur English RN  Body Position:   turned   side-lying, right   lower extremity elevated, right   lower extremity elevated, left   upper extremity elevated, left   upper extremity elevated, right  Taken 8/13/2021 0715 by Nupur English RN  Body Position: position maintained  Intervention: Prevent and Manage VTE (venous thromboembolism) Risk  Recent Flowsheet Documentation  Taken 8/13/2021 0900 by Nupur English RN  VTE Prevention/Management:   bilateral   sequential compression devices on  Goal: Optimal Comfort and Wellbeing  Outcome: Ongoing, Progressing  Goal: Readiness for Transition of Care  Outcome: Ongoing, Progressing     Problem: Fall Injury Risk  Goal: Absence of Fall and Fall-Related Injury  Outcome: Ongoing, Progressing  Intervention: Promote Injury-Free Environment  Recent Flowsheet Documentation  Taken 8/13/2021 1200 by Nupur English RN  Safety Promotion/Fall Prevention:   activity supervised   assistive device/personal items within reach   clutter free environment maintained   fall prevention program maintained   nonskid shoes/slippers when out of bed   room organization consistent   safety round/check completed  Taken 8/13/2021 1034 by Nupur English RN  Safety Promotion/Fall Prevention:   clutter free environment maintained   assistive device/personal items within reach   activity supervised   fall prevention program maintained   nonskid shoes/slippers when out of  bed   room organization consistent   safety round/check completed  Taken 8/13/2021 0900 by Nupur English RN  Safety Promotion/Fall Prevention:   assistive device/personal items within reach   activity supervised   clutter free environment maintained   fall prevention program maintained   nonskid shoes/slippers when out of bed   room organization consistent   safety round/check completed  Taken 8/13/2021 0715 by Nupur English RN  Safety Promotion/Fall Prevention:   clutter free environment maintained   assistive device/personal items within reach   activity supervised   fall prevention program maintained   nonskid shoes/slippers when out of bed   room organization consistent   safety round/check completed     Problem: Skin Injury Risk Increased  Goal: Skin Health and Integrity  Outcome: Ongoing, Progressing  Intervention: Optimize Skin Protection  Recent Flowsheet Documentation  Taken 8/13/2021 1406 by Nupur English RN  Head of Bed (HOB): HOB at 20-30 degrees  Taken 8/13/2021 1034 by Nupur English RN  Head of Bed (HOB): HOB at 20-30 degrees  Taken 8/13/2021 0900 by Nupur English RN  Pressure Reduction Techniques:   weight shift assistance provided   heels elevated off bed  Head of Bed (HOB): HOB at 20-30 degrees  Pressure Reduction Devices: alternating pressure pump (ADD)  Taken 8/13/2021 0715 by Nupur English RN  Head of Bed (HOB): HOB at 20-30 degrees     Problem: Asthma Comorbidity  Goal: Maintenance of Asthma Control  Outcome: Ongoing, Progressing     Problem: COPD Comorbidity  Goal: Maintenance of COPD Symptom Control  Outcome: Ongoing, Progressing     Problem: Diabetes Comorbidity  Goal: Blood Glucose Level Within Desired Range  Outcome: Ongoing, Progressing     Problem: Heart Failure Comorbidity  Goal: Maintenance of Heart Failure Symptom Control  Outcome: Ongoing, Progressing     Problem: Hypertension Comorbidity  Goal: Blood Pressure in Desired Range  Outcome: Ongoing,  Progressing     Problem: Obstructive Sleep Apnea Risk or Actual (Comorbidity Management)  Goal: Unobstructed Breathing During Sleep  Outcome: Ongoing, Progressing     Problem: Pain Chronic (Persistent) (Comorbidity Management)  Goal: Acceptable Pain Control and Functional Ability  Outcome: Ongoing, Progressing     Problem: Seizure Disorder Comorbidity  Goal: Maintenance of Seizure Control  Outcome: Ongoing, Progressing   Goal Outcome Evaluation:  Plan of Care Reviewed With: patient        Progress: improving  Outcome Summary: VSS, A&O x4, turn q2h, poor appetite, encourage fluids, phosphorus to be rechecked 6 hrs after current infusion, urine specimens sent,  refused PT today, possible home with home health tomorrow. .

## 2021-08-13 NOTE — PROGRESS NOTES
Continued Stay Note  [facility]     Patient Name: Brittany Villeda  MRN: [id number]  Today's Date: 8/13/2021    Admit Date: 8/5/2021    Discharge Plan     Row Name 08/13/21 1311       Plan    Plan  Home with family and [facility] via EMS    Plan Comments  Spoke with pt's RN who does report that MD does not plan to DC pt home today.  Call placed to [facility] and cancelled 2:00 transport for today.  CCP will follow to assist with finial DC needs.        Discharge Codes    No documentation.       Expected Discharge Date and Time     Expected Discharge Date Expected Discharge Time    Aug 13, 2021             GABRIEL Mixon

## 2021-08-14 ENCOUNTER — READMISSION MANAGEMENT (OUTPATIENT)
Dept: CALL CENTER | Facility: HOSPITAL | Age: 86
End: 2021-08-14

## 2021-08-14 VITALS
HEART RATE: 79 BPM | SYSTOLIC BLOOD PRESSURE: 126 MMHG | BODY MASS INDEX: 35.79 KG/M2 | OXYGEN SATURATION: 98 % | TEMPERATURE: 98 F | WEIGHT: 209.66 LBS | HEIGHT: 64 IN | DIASTOLIC BLOOD PRESSURE: 62 MMHG | RESPIRATION RATE: 18 BRPM

## 2021-08-14 PROBLEM — S06.5XAA SUBDURAL HEMATOMA (HCC): Status: RESOLVED | Noted: 2021-08-05 | Resolved: 2021-08-14

## 2021-08-14 LAB
ANION GAP SERPL CALCULATED.3IONS-SCNC: 5.7 MMOL/L (ref 5–15)
BUN SERPL-MCNC: 8 MG/DL (ref 8–23)
BUN/CREAT SERPL: 17 (ref 7–25)
CALCIUM SPEC-SCNC: 9.7 MG/DL (ref 8.6–10.5)
CHLORIDE SERPL-SCNC: 102 MMOL/L (ref 98–107)
CO2 SERPL-SCNC: 23.3 MMOL/L (ref 22–29)
CREAT SERPL-MCNC: 0.47 MG/DL (ref 0.57–1)
GFR SERPL CREATININE-BSD FRML MDRD: >150 ML/MIN/1.73
GLUCOSE SERPL-MCNC: 94 MG/DL (ref 65–99)
PHOSPHATE SERPL-MCNC: 3.1 MG/DL (ref 2.5–4.5)
POTASSIUM SERPL-SCNC: 4.3 MMOL/L (ref 3.5–5.2)
SODIUM SERPL-SCNC: 131 MMOL/L (ref 136–145)

## 2021-08-14 PROCEDURE — 80048 BASIC METABOLIC PNL TOTAL CA: CPT | Performed by: INTERNAL MEDICINE

## 2021-08-14 PROCEDURE — 97530 THERAPEUTIC ACTIVITIES: CPT

## 2021-08-14 PROCEDURE — 84100 ASSAY OF PHOSPHORUS: CPT | Performed by: INTERNAL MEDICINE

## 2021-08-14 RX ORDER — LEVETIRACETAM 500 MG/1
500 TABLET ORAL 2 TIMES DAILY
Qty: 14 TABLET | Refills: 0 | Status: SHIPPED | OUTPATIENT
Start: 2021-08-14 | End: 2021-08-30

## 2021-08-14 RX ORDER — LEVETIRACETAM 500 MG/1
500 TABLET ORAL 2 TIMES DAILY
Qty: 14 TABLET | Refills: 0 | Status: SHIPPED | OUTPATIENT
Start: 2021-08-14 | End: 2021-08-14

## 2021-08-14 RX ADMIN — PANTOPRAZOLE SODIUM 40 MG: 40 TABLET, DELAYED RELEASE ORAL at 06:18

## 2021-08-14 RX ADMIN — SODIUM CHLORIDE, PRESERVATIVE FREE 10 ML: 5 INJECTION INTRAVENOUS at 08:03

## 2021-08-14 RX ADMIN — LEVETIRACETAM 500 MG: 500 TABLET, FILM COATED ORAL at 08:03

## 2021-08-14 NOTE — CASE MANAGEMENT/SOCIAL WORK
Continued Stay Note  [facility]     Patient Name: Brittany Villeda  MRN: [id number]  Today's Date: 8/14/2021    Admit Date: 8/5/2021    Discharge Plan     Row Name 08/14/21 1645       Plan    Plan  Home with family and [facility] via [facility]    Patient/Family in Agreement with Plan  yes    Plan Comments  CCP spoke with [facility] and confirmed 1930 pickup time.  CCP filled out ambulance necessity form and advised staff RN of plan.  CCP to follow.    Row Name 08/14/21 1439       Plan    Plan  Home with family and [facility] via [facility]    Patient/Family in Agreement with Plan  yes    Plan Comments  Plan is home today.  CCP Spoke with [facility] to arrange transportation.  Time tentatively set for 1930 this afternoon - will call me back shortly with definite time.  CCP relayed this information to staff RN. CCP to follow.  Mari        Discharge Codes    No documentation.       Expected Discharge Date and Time     Expected Discharge Date Expected Discharge Time    Aug 14, 2021             Moshe Castro

## 2021-08-14 NOTE — CASE MANAGEMENT/SOCIAL WORK
Continued Stay Note  [facility]     Patient Name: Brittany Villeda  MRN: [id number]  Today's Date: 8/14/2021    Admit Date: 8/5/2021    Discharge Plan     Row Name 08/14/21 1439       Plan    Plan  Home with family and [facility] via [facility]    Patient/Family in Agreement with Plan  yes    Plan Comments  Plan is home today.  CCP Spoke with [facility] to arrange transportation.  Time tentatively set for 1930 this afternoon - will call me back shortly with definite time.  [facility] relayed this information to staff RN. CCP to follow.  Mari        Discharge Codes    No documentation.       Expected Discharge Date and Time     Expected Discharge Date Expected Discharge Time    Aug 14, 2021             Moshe Castro

## 2021-08-14 NOTE — PLAN OF CARE
Problem: Adult Inpatient Plan of Care  Goal: Plan of Care Review  Outcome: Ongoing, Progressing  Flowsheets  Taken 8/14/2021 0457 by Bri Barreto, RN  Plan of Care Reviewed With: patient  Outcome Summary: A&Ox4, VSS, turn q2hrs, encouraged po fluids, phos therapeutic, incision cdi and loki, NIH 10, possible home today with HH, will continue to monitor.

## 2021-08-14 NOTE — DISCHARGE SUMMARY
DISCHARGE SUMMARY    Patient Name: Brittany Villeda  Age/Sex: 86 y.o. female  : 1935  MRN: [id number]  Patient Care Team:  Mega Esparza MD as PCP - General (Family Medicine)  Micaela English MD as Consulting Physician (Gastroenterology)  Mary Grace Culp MD as Consulting Physician (Cardiology)  Jp Krause Jr., MD as Consulting Physician (Hematology and Oncology)  Yasmeen Pichardo [facility] as Pharmacist  Micaela English MD as Referring Physician (Gastroenterology)  Devon Valadez MD as Consulting Physician (Hematology and Oncology)  Rusty Interiano, PharmD as Pharmacist (Pharmacy)       Date of Admit: 2021  Date of Discharge:  21  Discharge Condition: Stable    Discharge Diagnoses:  1. Subdural hematoma subacute on chronic  2. Cerebral compression with midline shift  3. Atrial fibrillation on anticoagulation on presentation,  currently off anticoagulation  4. Recent GI bleed, on Protonix twice daily diastolic congestive heart failure compensated  5. MGUS  6. Sick sinus syndrome with permanent pacemaker  7. COPD  8. Obstructive sleep apnea  9. Hyponatremia  10. Hypophosphatemia    History of present illness from H&P from 2021:   86-year-old female who presents with slurred speech and headache. This has been going on for approximately 2 or 3 days, with intermittent headache. The patient has developed some weakness in her lower extremities but that has been going on for several weeks. Apparently her family last spoke to her over the phone and she was normal at 7 PM. Now the patient is manifesting slurred speech. She does take Coumadin at home for atrial fibrillation.  I discussed the case with Dr. Burnett. CT scan is showing intracerebral hemorrhage. Neurosurgery has been consulted by Dr. Burnett and they have ordered K-Centra be administered. Patient's INR is therapeutic since she is on Coumadin.  Old records reviewed.  Discharge summary was from May of this year.  The patient was  recently here in the hospital for hemorrhagic gastritis, duodenal ulcer.  She was apparently still considered appropriate candidate for anticoagulation therapy after the discharge of that hospital stay.    Hospital Course:   Brittany Villeda presented to [facility] with complaints of headache with initial work-up with CT scan done in the ER showing evidence of subdural hematoma with secondary mass-effect.patient was evaluated by neurosurgery, she did not have any drain, anticoagulation were reversed, and patient is to follow-up with cardiology as an outpatient to decide when to resume warfarin.  Patient had recent GI bleed was already on Protonix which was adjusted to twice daily dosing.  No significant problem from the COPD  Patient has diastolic congestive heart failure that was compensated  Patient has some mild hyponatremia and hypophosphatemia which were corrected prior to discharge, patient used to be on Lasix at home which will be on hold for the time being but may need to be resumed depending on the fluid retention after discharge.  Blood pressure is currently well controlled patient is on Keppra p.o. which will be kept for another week for post brain bleed seizure prophylaxis  Patient is to follow-up with neurosurgery in 10 days with repeat CT of the head to help assist with the decision on resuming anticoagulation.  No need to follow with us from the pulmonary or the CC stand point     Consults:   IP CONSULT TO NEUROSURGERY  IP CONSULT TO PULMONOLOGY  IP CONSULT TO NEURO CLINICAL SPECIALIST  IP CONSULT TO CASE MANAGEMENT   IP CONSULT TO NEUROSURGERY  IP CONSULT TO NUTRITION SERVICES  IP CONSULT TO CASE MANAGEMENT     Significant Discharge Diagnostics   Procedures Performed:  Procedure(s):  Left-sided dusty holes for evacuation of subdural hematoma       Pertinent Lab Results:  Results from last 7 days   Lab Units 08/14/21  0251 08/13/21  0541  08/12/21  1246 08/12/21  0543 08/11/21  0620 08/10/21  0416 08/09/21  0413 08/08/21  0418   SODIUM mmol/L 131* 130* 131* 132* 134* 137 138 139   POTASSIUM mmol/L 4.3 4.8 4.9 4.6 4.5 4.2 5.0 4.2   CHLORIDE mmol/L 102 99 99 100 101 102 104 106   CO2 mmol/L 23.3 23.6 25.4 25.4 26.3 26.7 23.8 24.8   BUN mg/dL 8 9 11 10 9 13 9 7*   CREATININE mg/dL 0.47* 0.48* 0.54* 0.45* 0.40* 0.64 0.64 0.53*   GLUCOSE mg/dL 94 92 107* 93 71 103* 134* 82   CALCIUM mg/dL 9.7 9.7 10.3 10.0 10.0 10.0 9.7 9.7         Results from last 7 days   Lab Units 08/13/21  0541 08/11/21  0620 08/11/21  0620 08/10/21  0416 08/10/21  0416 08/09/21  0413 08/09/21  0413 08/08/21  0418 08/08/21 0418   WBC 10*3/mm3 6.88  --  8.39  --  8.77  --  4.87  --  4.21   HEMOGLOBIN g/dL 11.0*  --  10.7*  --  11.4*  --  11.6*  --  11.2*   HEMATOCRIT % 33.4*  --  31.7*  --  35.1  --  34.1  --  33.8*   PLATELETS 10*3/mm3 219  --  203  --  206  --  239  --  225   MCV fL 98.2*  --  93.5  --  96.4  --  93.9  --  94.9   MCH pg 32.4   < > 31.6   < > 31.3   < > 32.0   < > 31.5   MCHC g/dL 32.9   < > 33.8   < > 32.5   < > 34.0   < > 33.1   RDW % 12.9   < > 13.2   < > 13.3   < > 13.3   < > 13.4   RDW-SD fl 45.9   < > 44.4   < > 47.4   < > 45.6   < > 47.3   MPV fL 9.2   < > 9.2   < > 9.0   < > 9.5   < > 9.2    < > = values in this interval not displayed.     Results from last 7 days   Lab Units 08/09/21  0413 08/08/21  0418   INR  1.25* 1.21*     Results from last 7 days   Lab Units 08/12/21  1246 08/10/21  0416 08/08/21  0418   MAGNESIUM mg/dL 2.1 2.0 1.9         Results from last 7 days   Lab Units 08/13/21  0541   PROBNP pg/mL 2,530.0*                                     Results from last 7 days   Lab Units 08/13/21  1212   SODIUM UR mmol/L 140   OSMOLALITY UR mOsm/kg 344       Imaging Results:  Imaging Results (All)     Procedure Component Value Units Date/Time    XR Chest 1 View [[id number]] Collected: 08/12/21 1926     Updated: 08/12/21 1934    Narrative:      XR CHEST 1  VW-     HISTORY: Female who is 86 years-old,  short of breath     TECHNIQUE: Frontal view of the chest     COMPARISON: 06/22/2021     FINDINGS: The heart size is borderline. Left-sided pacemaker and cardiac  leads are seen. Aorta is tortuous, calcified. Small right basilar  atelectasis or infiltrate, minimal right pleural effusion is suggested.  No pneumothorax. Right hemidiaphragm remains elevated. No acute osseous  process.       Impression:      Small right basilar atelectasis or infiltrate, minimal right  pleural effusion. Borderline heart size. Tortuous aorta.     This report was finalized on 8/12/2021 7:31 PM by Dr. Nas Maharaj M.D.       CT Head Without Contrast [[id number]] Collected: 08/11/21 0918     Updated: 08/11/21 1448    Narrative:      CT HEAD WITHOUT CONTRAST     CLINICAL HISTORY: Follow-up subdural hematoma.     TECHNIQUE: CT scan of the head was obtained with 3 mm axial images. No  intravenous contrast was administered. Sagittal and coronal  reconstructions were obtained.     COMPARISON: Previous head CT dated 08/09/2021.     FINDINGS:       There are 2 left lateral parietal dusty holes that have been placed for  the purposes of evacuation of a left lateral subdural hematoma. There is  a predominantly low-density subdural collection appreciated lateral to  left cerebral hemisphere which measures up to approximately 9-10 mm in  diameter lateral to the left frontal lobe. No significant interval  change is identified in the size of this collection. This collection has  a lower density along the lateral aspect of the left frontal lobe,  whereas along the lateral aspect of the left parietal lobe, there are  some areas of relatively higher density subdural hematoma. These  findings are stable when compared to the prior exam. Lateral to the  right cerebral hemisphere, there are additional mixed attenuation  subdural collections which demonstrate no interval detrimental change.  Again, lateral to  the right parietal and occipital lobes, there are  relatively higher density foci which demonstrate interval decrease in  density since the prior exam, compatible with interval evolutionary  change. The collection measures up to approximately 3 mm in diameter  lateral to the right frontal lobe. Additionally, there are areas of  subdural hematoma along the right aspect of the falx cerebra medial to  the right occipital lobe which layers along the superior aspect of the  right tentorial leaf. This additionally remained stable. There is a  moderate amount of pneumocephalus that demonstrates no significant  interval change. There is midline shift to the right by approximately 3  mm which is stable.     Otherwise, the ventricles, sulci, and cisterns are age appropriate. The  basal ganglia and thalami are unremarkable in appearance. The posterior  fossa structures are unremarkable.       Impression:         Stable findings when compared to the prior study dated 08/09/2021. Again  noted are mixed attenuation subdural collections lateral to both  cerebral hemispheres, as well as medial to the right cerebral hemisphere  layering along the right tentorial leaf. Postsurgical changes are again  appreciated with 2 left lateral parietal dusty holes and a moderate  amount of pneumocephalus is noted that demonstrates no significant  interval change.           Radiation dose reduction techniques were utilized, including automated  exposure control and exposure modulation based on body size.     This report was finalized on 8/11/2021 2:45 PM by Dr. Froy Lindsey M.D.       CT Head Without Contrast [[id number]] Collected: 08/09/21 2138     Updated: 08/09/21 2153    Narrative:      CT HEAD WITHOUT CONTRAST     HISTORY: Changes in vision. Patient is status post bur hole and subdural  drain placement     COMPARISON: 08/08/2021     TECHNIQUE: Axial CT imaging was obtained through the brain. No IV  contrast was administered.      FINDINGS:  As was previously discussed, this patient has undergone 2 parietal dusty  hole procedures. A previously identified subdural drain has been  removed. The amount of pneumocephalus overlying the left convexity has  decreased when compared to the prior exam. There is some residual fluid  overlying the left cerebral convexity. It is predominantly low to  isodense to brain parenchyma. Maximum thickness is approximately 9 mm  overlying the frontal lobe near the vertex. When remeasured from prior  exam, this same area measured up to 1.2 cm. The patient also has a mixed  density collection overlying the right cerebral convexity. Maximum  thickness of the more hyperdense component is approximately 4 mm. This  is stable when compared to prior exam. Mass effect upon the lateral  ventricles is improved. There is no significant midline shift. Patient  also has a small amount of parafalcine subdural hemorrhage. There is  also a small amount of hemorrhage layering over the tentorium on the  right, measuring up to 3 mm. This also appears stable. No new areas of  hemorrhage are seen. There is periventricular and deep white matter  microangiopathic change. There is atrophy. There is some gas identified  within the left scalp. There is also some edema. Orbits appear  unremarkable. Paranasal sinuses and mastoid air cells appear clear.       Impression:         1. Postsurgical changes, as noted above. Subdural drain on the left has  been removed. Pneumocephalus has decreased. Left cerebral convexity  collection has decreased slightly, and a right subdural collection is  stable, as are the parafalcine and right tentorial collections. Midline  shift has essentially resolved.     Radiation dose reduction techniques were utilized, including automated  exposure control and exposure modulation based on body size.     This report was finalized on 8/9/2021 9:50 PM by Dr. Luna Oleary M.D.       CT Head Without Contrast  [[id number]] Collected: 08/08/21 1957     Updated: 08/09/21 1200    Narrative:      CT SCAN OF THE HEAD WITHOUT CONTRAST      CLINICAL HISTORY: Postop evacuation of subdural hematoma.     TECHNIQUE: CT scan of the head was obtained with 3 mm axial images. No  intravenous contrast was administered.     COMPARISON: Comparison is made to previous CT scan of the head from  earlier today timed at approximately 12:04 PM.     FINDINGS:     In the interval since the prior examination performed earlier today, the  patient has undergone 2 left lateral parietal dusty holes for the  purposes of evacuation of the left lateral subdural hematoma. There is a  drainage catheter appreciated along the lateral aspect of the left  parietal lobe. The previously identified mixed attenuation subdural  hematoma is decreased in size. On the prior study performed earlier  today, the collection measured up to approximately 11 mm in diameter  lateral to the left frontal lobe. On the current exam, this component of  the subdural hematoma measures up to approximately 6 mm in diameter. The  previously identified mixed attenuation subdural hematoma lateral to the  right cerebral hemisphere demonstrates no significant interval change.  This subdural hematoma measures up to approximately 5 mm in diameter.  Additionally, there are foci of subdural hematoma appreciated along the  right aspect of the falx cerebri medial to the right occipital lobe and  this further layers along the superior surface of the right tentorial  leaf. This component of the subdural hematoma measures up to 2-3 mm in  diameter. There is a low-density subdural collection along the right  aspect of the falx cerebri medial to the right frontal and parietal  lobes measuring up to 3 mm in diameter. These additional subdural  collections demonstrate no significant interval change when compared to  the study performed earlier today. The previously identified mass effect  with midline  shift to the right is improved from approximately 8 mm to  now 3-4 mm. Again noted is some left-sided uncal herniation although  uncal herniation appears improved as well. A moderate amount of  postoperative pneumocephalus is incidentally noted.     Also, incidental note is made of postoperative changes within the left  lateral scalp.       Impression:         In the interval since the prior study performed earlier today, the  patient has undergone 2 left lateral parietal dusty holes and placement  of a subdural drainage catheter lateral to left cerebral hemisphere. The  previously identified subdural collection lateral to the left cerebral  hemisphere is decreased in size. The remaining subdural collections, as  discussed in detail above, demonstrate no significant interval change.  The previously identified mass effect with midline shift to the right is  improved from approximately 8 mm on the prior exam to now approximately  3-4 mm on the current study.     A moderate amount of pneumocephalus is incidentally identified. Also,  postoperative changes are incidentally noted within the left lateral  scalp.     Radiation dose reduction techniques were utilized, including automated  exposure control and exposure modulation based on body size.     This report was finalized on 8/9/2021 11:57 AM by Dr. Froy Lindsey M.D.       FL Video Swallow With Speech Single Contrast [[id number]] Collected: 08/09/21 1023     Updated: 08/09/21 1029    Narrative:      VIDEO SWALLOW STUDY     HISTORY: Dysphagia.     3 minutes 24 seconds fluoroscopy was provided for the speech pathologist  during a video swallow study. 6125 images were saved.     There is multilevel degenerative disc disease. There is premature  spillage of ingested contents. Episodes of penetration are identified  without annetta aspiration.       Impression:      Fluoroscopy was provided for the speech pathologist during a  video swallow study. For full details please see  the speech pathology  report.     This report was finalized on 8/9/2021 10:26 AM by Dr. Shasha Pitt M.D.       CT Head Without Contrast [[id number]] Collected: 08/08/21 1330     Updated: 08/08/21 1358    Narrative:      CT SCAN OF THE HEAD WITHOUT CONTRAST     CLINICAL HISTORY: Follow-up subdural hematoma.     CT scan of the head was obtained with 3 mm axial images. No intravenous  contrast was administered.     COMPARISON: Comparison is made to previous CT scan of head dated  08/07/2021.     FINDINGS:     There are mixed attenuation subdural hematomas appreciated lateral to  both cerebral hemispheres. The collection lateral to the left cerebral  hemisphere has greater density lateral to the left parietal and  occipital lobes where this component is of slightly greater density  relative to adjacent brain parenchyma. More anteriorly, the predominant  components of the subdural hematoma approximate this density of CSF  although they are admixed areas of higher density that are probably  largely reflective of membranes although there may be components of  acute subdural hemorrhage as well. The subdural hematoma lateral to the  left frontal lobe measures up to approximately 11 mm in diameter. The  appearance and size of this subdural hematoma is unchanged when compared  to the prior study. Additionally, there is a component of relatively  higher density subdural hematoma along the left aspect of falx cerebri  medial to left frontal lobe which measures up to approximately 2 mm in  diameter that also demonstrates no significant interval change. Along  the right aspect of falx cerebri medial to the right frontal and  parietal lobes, there is a low-density subdural collection measuring up  to 5 mm in diameter that is also unchanged. Lateral to the right  cerebral hemisphere, there is a subdural hematoma consisting of both  hypodense and more dense components. Again, the more dense components  are noted posteriorly along  the lateral aspect of the right parietal and  occipital lobes. The collection lateral to the right frontal lobe  measures up to approximately 4 mm in diameter. Again, these findings  demonstrate no significant interval change. Again noted is more acute  blood products within the subdural space along the medial aspect of the  right occipital lobe measuring up to approximately 2-3 mm in diameter.  This component of the subdural hematoma layers along the superior aspect  of the right tentorial leaf. There is mass effect with sulcal and  ventricular effacement as well as shift of the midline structures to the  right by approximately 8 mm. These findings demonstrate no significant  interval change when compared to the prior exam. Additionally identified  is left-sided uncal herniation which was also noted on the prior study.       Impression:         There is no significant interval change since the prior examination  dated 08/07/2021. Again noted are bilateral lateral cerebral hemispheric  mixed attenuation subdural hematoma collections, left larger than right.  These collections consist predominantly of subacute and chronic blood  products. Additional components are noted along the falx cerebri as  discussed in detail above. There is mass effect with midline shift to  the right by approximately 8 mm which demonstrates no significant  interval change. Also again noted is left-sided uncal herniation.     These findings were discussed with Dr. Callaway on 08/08/2021 at  approximately 1:10 PM.      Radiation dose reduction techniques were utilized, including automated  exposure control and exposure modulation based on body size.     This report was finalized on 8/8/2021 1:55 PM by Dr. Froy Lindsey M.D.       CT Head Without Contrast [[id number]] Collected: 08/07/21 0447     Updated: 08/07/21 0457    Narrative:      CT HEAD WITHOUT CONTRAST     HISTORY: Subdural hematoma     COMPARISON: 08/06/2021     TECHNIQUE: Axial CT  imaging was obtained through the brain. No IV  contrast was administered.     FINDINGS:  The patient has bilateral mixed density subdural hematomas. These appear  stable in thickness and density. There is midline shift to the right  approximately 5 mm. Mass effect with effacement of the sulci is seen  throughout both cerebral hemispheres, and there is also some effacement  of the ventricles bilaterally. No new areas of hemorrhage are seen.  Patient does appear to have some parafalcine hemorrhage, as well as some  hemorrhage layering over the tentorium, more significant on the right.  Again, this does not appear significantly changed.       Impression:      No significant interval change.     Radiation dose reduction techniques were utilized, including automated  exposure control and exposure modulation based on body size.     This report was finalized on 8/7/2021 4:54 AM by Dr. Luna Oleary M.D.       CT Head Without Contrast [[id number]] Collected: 08/06/21 1103     Updated: 08/06/21 1125    Narrative:      CT HEAD WO CONTRAST-     CLINICAL HISTORY: Follow-up subdural hematomas. Follow-up possible  subarachnoid hemorrhage.     TECHNIQUE: Transverse 3 mm thick images were acquired from the base of  skull to the vertex without IV contrast.     Radiation dose reduction techniques were utilized, including automated  exposure control and exposure modulation based on body size.     COMPARISON: CT scan of the head dated 08/05/2021.     FINDINGS: Again seen is a mixed density subdural hematoma overlying the  left cerebral hemisphere measuring up to 11 mm in thickness that appears  unchanged in overall size and configuration. The hematoma appears  chronic anteriorly where it has density similar to CSF. However,  posteriorly it is essentially isodense with brain parenchyma. There is a  thinner mixed density subdural hematoma overlying the right cerebral  convexity that is also low density anteriorly and isodense  posteriorly.  This hematoma measures approximately 5 mm in thickness, and is also  unchanged. There is no definite evidence of subarachnoid hemorrhage.  There is mild calcification thickening of the falx cerebri. No definite  subdural is identified along the course of the falx. There is mass  effect with left to right shift of the midline structures by 7 mm that  is unchanged. Diffuse effacement of cortical sulci throughout both  cerebral hemispheres is also unchanged no new hemorrhage is identified.  There is no evidence of recent or old infarct. The visualized paranasal  sinuses are well aerated.       Impression:      Bilateral mixed density subdural hematomas overlying the  cerebral convexities show no significant overall change in size or  configuration since the CT performed yesterday. No new hemorrhage is  identified. There is mass effect with effacement of cortical sulci and  left to right shift of the midline structures by 7 mm that is stable.     The patient's nurse was informed that a completed corrected report was  available for review on the electronic medical record system on  08/06/2021 at 11:22 AM.     This report was finalized on 8/6/2021 11:22 AM by Dr. Luc Ritchie M.D.       CT Head Without Contrast [[id number]] Collected: 08/05/21 2115     Updated: 08/05/21 2115    Narrative:        Patient: PALOMA CAPPS  Time Out: 21:14  Exam(s): CT HEAD Without Contrast     EXAM:    CT Head Without Intravenous Contrast    CLINICAL HISTORY:     Reason for exam: headache.    TECHNIQUE:    Axial computed tomography images of the head brain without intravenous   contrast.  CTDI is 54.8 mGy and DLP is 958 mGy-cm.  This CT exam was   performed according to the principle of ALARA (As Low As Reasonably   Achievable) by using one or more of the following dose reduction   techniques: automated exposure control, adjustment of the mA and or kV   according to patient size, and or use of iterative reconstruction    technique.    COMPARISON:    No relevant prior studies available.    FINDINGS:    Brain:  Subacute subdural hematoma along the left cerebral convexity   measuring to 12 mm in maximum thickness.  Question subtle diffuse   subarachnoid hemorrhage.  Findings are limited secondary to mass-effect   on the sulci from the adjacent subdural hematomas.  Subacute subdural   hematoma with some acute blood products along the right cerebral   convexity measuring up to 5 mm in maximum thickness.  Chronic small   vessel ischemic disease and senescent changes.    Midline shift:  9 mm of rightward midline shift.    Ventricles:  Unremarkable.  No ventriculomegaly.    Bones joints:  Unremarkable.  No acute calvarial fracture.    Soft tissues:  Unremarkable.    Sinuses:  Unremarkable as visualized.    Mastoid air cells:  Unremarkable as visualized.    IMPRESSION:       1.  Subacute subdural hematoma along the left cerebral convexity   measuring to 12 mm in maximum thickness.  2.  Question subtle diffuse subarachnoid hemorrhage.  Findings are   limited secondary to mass-effect on the sulci from the adjacent subdural   hematomas.  3.  9 mm of rightward midline shift.      Impression:          Electronically signed by Rusty Martínez MD on 08-05-21 at 2114      2D echocardiogram 4/8/2021:  · The right atrial cavity is severely dilated.  · Left atrium is dilated severely  · Estimated left ventricular EF = 60% Left ventricular systolic function is normal.  · Left ventricular diastolic function was indeterminate.  · Mildly reduced right ventricular systolic function noted.  · Saline test results are negative.    Objective:   Temp:  [96.7 °F (35.9 °C)-98.6 °F (37 °C)] 98 °F (36.7 °C)  Heart Rate:  [74-83] 79  Resp:  [16-18] 18  BP: (109-133)/(55-81) 126/62   SpO2:  [94 %-100 %] 98 %  on    Device (Oxygen Therapy): room air    Intake/Output Summary (Last 24 hours) at 8/14/2021 1343  Last data filed at 8/14/2021 1254  Gross per 24 hour    Intake 500 ml   Output 1550 ml   Net -1050 ml     Body mass index is 35.97 kg/m².      08/07/21  0500 08/09/21  0400 08/10/21  0500   Weight: 90.8 kg (200 lb 2.8 oz) 94.9 kg (209 lb 3.5 oz) 95.1 kg (209 lb 10.5 oz)     Weight change:     Physical Exam:      General: slow but alert and responsive cooperative, in no acute distress.         HEENT:  No oral lesions. No thrush. Oral mucosa moist.   Chest Wall:  No abnormalities observed.             Neck:  Trachea midline. No JVD.   Pulmonary:  CTAB. No wheezes. Respirations regular, even and unlabored.          Cardio:  The rhythm is irregular with normal rate. Normal S1 and S2. No murmur, gallop, rub or click.    Abdominal:  Soft, non-tender and non-distended. Normal bowel sounds. No masses. No organomegaly. No guarding. No rebound tenderness.  Extremities:  Moves all extremities well. No cyanosis. No redness. No edema.     Discharge Medications and Instructions:     Discharge Medications     Discharge Medications      New Medications      Instructions Start Date   levETIRAcetam 500 MG tablet  Commonly known as: KEPPRA   500 mg, Oral, 2 Times Daily         Continue These Medications      Instructions Start Date   acetaminophen 500 MG tablet  Commonly known as: TYLENOL   500 mg, Oral, Every 6 Hours PRN      docusate sodium 100 MG capsule  Commonly known as: COLACE   100 mg, Oral, Nightly      magnesium oxide 400 MG tablet  Commonly known as: MAG-OX   400 mg, Oral, Daily      mesalamine 0.375 g 24 hr capsule  Commonly known as: APRISO   TAKE 4 CAPSULES DAILY      pantoprazole 40 MG EC tablet  Commonly known as: Protonix   40 mg, Oral, 2 Times Daily      vitamin B-12 1000 MCG tablet  Commonly known as: CYANOCOBALAMIN   1,000 mcg, Oral, Daily         Stop These Medications    furosemide 20 MG tablet  Commonly known as: LASIX     warfarin 2.5 MG tablet  Commonly known as: COUMADIN            Discharge Diet:    Dietary Orders (From admission, onward)     Start     Ordered     08/09/21 1030  Diet Regular; Cardiac  Diet Effective Now     Question Answer Comment   Diet Texture / Consistency Regular    Common Modifiers Cardiac        08/09/21 1031                Activity at Discharge:   as tolerated     Discharge disposition: Skilled nursing facility    Discharge Instructions and Follow ups:      Contact information for follow-up providers     Mega Esparza MD .    Specialty: Family Medicine  Contact information:  [address]  [address]  [facility] [address] [phone]                   Contact information for after-discharge care     Home Medical Care     [facility] .    Service: Home Health Services  Contact information:  [address] [place] [id number] [phone]                           Future Appointments   Date Time Provider Department Center   8/24/2021  2:30 PM [facility] CT 3 BH [facility] CT [facility]   8/24/2021  4:00 PM Gricelda Velazquez, APRN [facility]   8/30/2021 11:15 AM Mega Esparza MD [facility]   1/31/2022 10:00 AM Mega Esparza MD [facility]        Medication Reconciliation: Please see electronically completed Med Rec.    Total time spent discharging patient including evaluation, medication reconciliation, arranging follow up, and post hospitalization instructions and education total time exceeds 30 minutes.     Bella Vasquez MD  08/14/21  13:43 EDT      Dictated utilizing Dragon dictation

## 2021-08-14 NOTE — CASE MANAGEMENT/SOCIAL WORK
"Physicians Statement of Medical Necessity for  Ambulance Transportation    GENERAL INFORMATION     Name: Brittany Villeda  YOB: 1935  Medicare #:   Transport Date: 08/14/2021 (Valid for round trips this date, or for scheduled repetitive trips for 60 days from the date signed below.)  [facility]: [facility]  Destination: [address] [place] [address]  Is the Patient's stay covered under Medicare Part A (PPS/DRG?)Yes  Closest appropriate facility? Yes  If this a hosp-hosp transfer? No  Is this a hospice patient? No    MEDICAL NECESSITY QUESTIONAIRE    Ambulance Transportation is medically necessary only if other means of transportation are contraindicated or would be potentially harmful to the patient.  To meet this requirement, the patient must be either \"bed confined\" or suffer from a condition such that transport by means other than an ambulance is contraindicated by the patient's condition.  The following questions must be answered by the healthcare professional signing below for this form to be valid:     1) Describe the MEDICAL CONDITION (physical and/or mental) of this patient AT THE TIME OF AMBULANCE TRANSPORT that requires the patient to be transported in an ambulance, and why transport by other means is contraindicated by the patient's condition: max assist post ICH  Past Medical History:   Diagnosis Date   • Acute UTI (urinary tract infection) 4/8/2021   • Anemia    • Arrhythmia    • Arthritis    • Asthma    • Bradycardia    • Chest pain    • Choledocholithiasis 5/9/2021   • Colitis    • COPD (chronic obstructive pulmonary disease) (CMS/[facility])    • Diastolic dysfunction    • Essential hypertension 5/12/2016   • GERD (gastroesophageal reflux disease)    • Heart block    • Hypertension    • Hypertensive heart disease    • Hyperthyroidism    • Kidney stone    • Leukopenia    • Low back pain    • MGUS (monoclonal gammopathy of unknown significance)    • Nephrolithiasis    • Obesity  " "  • Paroxysmal atrial fibrillation (CMS/HCC)    • Peptic ulceration    • Persistent atrial fibrillation (CMS/HCC)    • PSVT (paroxysmal supraventricular tachycardia) (CMS/HCC)    • Pulmonary hypertension (CMS/HCC)    • Rectal bleed    • Sleep apnea    • Systemic hypertension    • Trifascicular block    • 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 MAIN OR;  Service: Neurosurgery;  Laterality: Left;   • CARDIAC ELECTROPHYSIOLOGY PROCEDURE N/A 11/7/2018    Procedure: Pacemaker DC new   [place];  Surgeon: Jesus Granda MD;  Location: [facility] CATH INVASIVE LOCATION;  Service: Cardiology   • CHOLECYSTECTOMY     • COLONOSCOPY  06/16/2014    colitis, cryptitis,  tics, NBIH, TA w/low grade dysplasia   • COLONOSCOPY N/A 10/28/2019    Procedure: COLONOSCOPY WITH COLD AND HOT POLYPECTOMIES;  Surgeon: Micaela English MD;  Location: [facility] ENDOSCOPY;  Service: Gastroenterology   • ENDOSCOPY N/A 5/13/2021    Procedure: ESOPHAGOGASTRODUODENOSCOPY with BX;  Surgeon: Stefan Mcfadden MD;  Location: [facility] ENDOSCOPY;  Service: Gastroenterology;  Laterality: N/A;  EPIGASTRIC PAIN  --DUODENAL ULCER, HEMORRHAGIC GASTRITIS, HIATAL HERNIA    • HEMORRHOIDECTOMY     • HYSTERECTOMY     • JOINT REPLACEMENT     • KNEE ARTHROPLASTY     • MYOMECTOMY     • PACEMAKER IMPLANTATION     • SHOULDER SURGERY     • SINUS SURGERY     • TOE NAIL AMPUTATION  03/04/2019   • TONSILLECTOMY        2) Is this patient \"bed confined\" as defined below?No    To be \"bed confined\" the patient must satisfy all three of the following criteria:  (1) unable to get up from bed without assistance; AND (2) unable to ambulate;  AND (3) " unable to sit in a chair or wheelchair.  3) Can this patient safely be transported by car or wheelchair van (I.e., may safely sit during transport, without an attendant or monitoring?)No   4. In addition to completing questions 1-3 above, please check any of the following conditions that apply*:          *Note: supporting documentation for any boxes checked must be maintained in the patient's medical records Unable to tolerate seated position for time needed to transport      SIGNATURE OF PHYSICIAN OR OTHER AUTHORIZED HEALTHCARE PROFESSIONAL    I certify that the above information is true and correct based on my evaluation of this patient, and represent that the patient requires transport by ambulance and that other forms of transport are contraindicated.  I understand that this information will be used by the Centers for Medicare and Medicaid Services (CMS) to support the determiniation of medical necessity for ambulance services, and I represent that I have personal knowledge of the patient's condition at the time of transport.       If this box is checked, I also certify that the patient is physically or mentally incapable of signing the ambulance service's claim form and that the institution with which I am affiliated has furnished care, services or assistance to the patient.  My signature below is made on behalf of the patient pursuant to 42 .36(b)(4). In accordance with 42 .37, the specific reason(s) that the patient is physically or mentally incapable of signing the claim for is as follows:    Signature of Physician or Healthcare Professional  Date/Time:        (For Scheduled repetitive transport, this form is not valid for transports performed more than 60 days after this date).                                                                                                                                             --------------------------------------------------------------------------------------------  Printed Name and Credentials of Physician or Authorized Healthcare Professional     *Form must be signed by patient's attending physician for scheduled, repetitive transports,.  For non-repetitive ambulance transports, if unable to obtain the signature of the attending physician, any of the following may sign (please select below):     Physician  Clinical Nurse Specialist  Registered Nurse     Physician Assistant  Discharge Planner  Licensed Practical Nurse     Nurse Practitioner

## 2021-08-14 NOTE — THERAPY TREATMENT NOTE
Patient Name: Brittany Villeda  : 1935    MRN: [id number]                              Today's Date: 2021       Admit Date: 2021    Visit Dx:     ICD-10-CM ICD-9-CM   1. Subdural hematoma (CMS/HCC)  S06.5X9A 432.1   2. SAH (subarachnoid hemorrhage) (CMS/HCC)  I60.9 430   3. 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)   • Other chest pain   • Lichen sclerosus   • Heart block   • Sleep-related hypoxia   • Bradycardia   • Shoulder arthritis   • History of atrial fibrillation   • Pacemaker   • Paroxysmal SVT (supraventricular tachycardia) (CMS/HCC)   • History of chest pain   • Palpitations   • Atrial fibrillation (CMS/HCC)   • Rectal bleeding   • Arthritis   • Ascending aortic aneurysm (CMS/HCC)   • Mediastinal lymphadenopathy   • Immobility   • Left knee pain   • Chronic anticoagulation   • Hemarthrosis of left knee   • Anemia   • Obesity (BMI 30-39.9)   • Generalized weakness   • Dysautonomia (CMS/HCC)   • Weakness of both lower extremities   • Macrocytosis   • Hypercalcemia   • Arthritis of right wrist   • Hyperparathyroidism (CMS/HCC)   • Choledocholithiasis   • Essential hypertension   • Hyperglycemia   • Epigastric pain   • Duodenal ulcer   • Hemorrhagic gastritis   • Exertional dyspnea   • COPD (chronic obstructive pulmonary disease) (CMS/HCC)   • Functional quadriplegia (CMS/HCC)   • Hip pain   • Osteoarthritis of glenohumeral joint   • Other malaise and fatigue   • Shoulder pain   • ICH  (intracerebral hemorrhage) (CMS/HCC)   • Subdural hematoma (CMS/HCC)     Past Medical History:   Diagnosis Date   • Acute UTI (urinary tract infection) 4/8/2021   • Anemia    • Arrhythmia    • Arthritis    • Asthma    • Bradycardia    • Chest pain    • Choledocholithiasis 5/9/2021   • Colitis    • COPD (chronic obstructive pulmonary disease) (CMS/HCC)    • Diastolic dysfunction    • Essential hypertension 5/12/2016   • GERD (gastroesophageal reflux disease)    • Heart block    • Hypertension    • Hypertensive heart disease    • Hyperthyroidism    • Kidney stone    • Leukopenia    • Low back pain    • MGUS (monoclonal gammopathy of unknown significance)    • Nephrolithiasis    • Obesity    • Paroxysmal atrial fibrillation (CMS/HCC)    • Peptic ulceration    • Persistent atrial fibrillation (CMS/HCC)    • PSVT (paroxysmal supraventricular tachycardia) (CMS/HCC)    • Pulmonary hypertension (CMS/HCC)    • Rectal bleed    • Sleep apnea    • Systemic hypertension    • Trifascicular block    • 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 MAIN OR;  Service: Neurosurgery;  Laterality: Left;   • CARDIAC ELECTROPHYSIOLOGY PROCEDURE N/A 11/7/2018    Procedure: Pacemaker DC new   [place];  Surgeon: Jesus Granda MD;  Location: [facility] CATH INVASIVE LOCATION;  Service: Cardiology   • CHOLECYSTECTOMY     • COLONOSCOPY  06/16/2014    colitis, cryptitis,  tics, NBIH, TA w/low grade dysplasia   • COLONOSCOPY N/A 10/28/2019    Procedure: COLONOSCOPY WITH COLD AND HOT POLYPECTOMIES;  Surgeon: Micaela English MD;  Location: [facility] ENDOSCOPY;  Service: Gastroenterology   • ENDOSCOPY N/A 5/13/2021     Procedure: ESOPHAGOGASTRODUODENOSCOPY with BX;  Surgeon: Stefan Mcfadden MD;  Location: [facility] ENDOSCOPY;  Service: Gastroenterology;  Laterality: N/A;  EPIGASTRIC PAIN  --DUODENAL ULCER, HEMORRHAGIC GASTRITIS, HIATAL HERNIA    • HEMORRHOIDECTOMY     • HYSTERECTOMY     • JOINT REPLACEMENT     • KNEE ARTHROPLASTY     • MYOMECTOMY     • PACEMAKER IMPLANTATION     • SHOULDER SURGERY     • SINUS SURGERY     • TOE NAIL AMPUTATION  03/04/2019   • TONSILLECTOMY       General Information     Row Name 08/14/21 1257          Physical Therapy Time and Intention    Document Type  therapy note (daily note)  -EM     Mode of Treatment  individual therapy;physical therapy  -EM       User Key  (r) = Recorded By, (t) = Taken By, (c) = Cosigned By    Initials Name Provider Type    Angela Colon PT Physical Therapist        Mobility     Row Name 08/14/21 1257          Bed Mobility    Bed Mobility  rolling right  -EM     Rolling Right [place] (Bed Mobility)  moderate assist (50% patient effort);verbal cues  -EM     Scooting/Bridging [place] (Bed Mobility)  dependent (less than 25% patient effort);2 person assist  -EM     Supine-Sit [place] (Bed Mobility)  maximum assist (25% patient effort);verbal cues  -EM     Sit-Supine [place] (Bed Mobility)  maximum assist (25% patient effort);2 person assist;verbal cues  -EM     Assistive Device (Bed Mobility)  bed rails;head of bed elevated;draw sheet  -EM       User Key  (r) = Recorded By, (t) = Taken By, (c) = Cosigned By    Initials Name Provider Type    Angela Colon PT Physical Therapist        Obj/Interventions     Row Name 08/14/21 1258          Motor Skills    Therapeutic Exercise  other (see comments) AP, heel slides, LAQ (with very limited ROM), shoulder flx, shoulder shrugs, scapular adduction, elbow flx/ext x 5 reps  -EM     Row Name 08/14/21 1258          Balance    Comment, Balance  pt sat on EOB with CGA, cues for upright posture,  neutral head position and to open her eyes. patient with flat affect, c/o neck pain, keeps eyes closed unless constantly cued  -EM       User Key  (r) = Recorded By, (t) = Taken By, (c) = Cosigned By    Initials Name Provider Type    Angela Colon PT Physical Therapist        Goals/Plan     Row Name 08/14/21 1305          Bed Mobility Goal 1 (PT)    Activity/Assistive Device (Bed Mobility Goal 1, PT)  bed mobility activities, all  -EM     [place] Level/Cues Needed (Bed Mobility Goal 1, PT)  moderate assist (50-74% patient effort)  -EM     Time Frame (Bed Mobility Goal 1, PT)  1 week  -EM     Progress/Outcomes (Bed Mobility Goal 1, PT)  goal revised this date;progress slower than expected  -EM     Row Name 08/14/21 1305          Transfer Goal 1 (PT)    Activity/Assistive Device (Transfer Goal 1, PT)  bed-to-chair/chair-to-bed  -EM     [place] Level/Cues Needed (Transfer Goal 1, PT)  moderate assist (50-74% patient effort);2 person assist  -EM     Time Frame (Transfer Goal 1, PT)  1 week  -EM     Progress/Outcome (Transfer Goal 1, PT)  goal revised this date;progress slower than expected  -EM     Row Name 08/14/21 1305          Gait Training Goal 1 (PT)    Progress/Outcome (Gait Training Goal 1, PT)  goal no longer appropriate;unable to make needed progress  -EM       User Key  (r) = Recorded By, (t) = Taken By, (c) = Cosigned By    Initials Name Provider Type    Angela Colon PT Physical Therapist        Clinical Impression     Row Name 08/14/21 1300          Pain    Additional Documentation  Pain Scale: Numbers Pre/Post-Treatment (Group)  -EM     Row Name 08/14/21 1300          Pain Scale: Numbers Pre/Post-Treatment    Pain Location  neck  -EM     Pre/Posttreatment Pain Comment  family at bedside states pt's shoulders hurt, pt denies sholder pain, states the back of her neck hurts especially when moving the pillow under her head, upper traps very right, pt does not rate pain on  "numeric scale  -EM     Pain Intervention(s)  Repositioned asked pt about trying K-pad, family states \"She can't have heat\"  -EM     Row Name 08/14/21 1300          Plan of Care Review    Plan of Care Reviewed With  patient;family  -EM     Outcome Summary  patient resting in bed but arousable, family at the bedside, patient agreeable to PT. Patient performed a few ROM exercises in supine, bed mobility with maxAx2, sat up on EOB with CGA with flexed posture. Patient able to sit upright with cues for upright posture and cues to open her eyes, c/o pain in the back of her neck. Patient with flat affect, demonstrates very limited ROM with exercise attempts, limited activity tolerance. Patient will require total assist for all mobility at home. Patient's family present for treatment session.  -EM     Row Name 08/14/21 1300          Positioning and Restraints    Pre-Treatment Position  in bed  -EM     Post Treatment Position  bed  -EM     In Bed  supine;call light within reach;with family/caregiver;exit alarm on  -EM       User Key  (r) = Recorded By, (t) = Taken By, (c) = Cosigned By    Initials Name Provider Type    Angela Colon, PT Physical Therapist        Outcome Measures     Row Name 08/14/21 1305          How much help from another person do you currently need...    Turning from your back to your side while in flat bed without using bedrails?  2  -EM     Moving from lying on back to sitting on the side of a flat bed without bedrails?  2  -EM     Moving to and from a bed to a chair (including a wheelchair)?  1  -EM     Standing up from a chair using your arms (e.g., wheelchair, bedside chair)?  1  -EM     Climbing 3-5 steps with a railing?  1  -EM     To walk in hospital room?  1  -EM     AM-PAC 6 Clicks Score (PT)  8  -EM       User Key  (r) = Recorded By, (t) = Taken By, (c) = Cosigned By    Initials Name Provider Type    Angela Colon, PT Physical Therapist                       Physical " Therapy Education                 Title: PT OT SLP Therapies (In Progress)     Topic: Physical Therapy (In Progress)     Point: Mobility training (Done)     Learning Progress Summary           Patient Acceptance, E, VU by EM at 8/14/2021 1306    Acceptance, E, NR by  at 8/12/2021 1610    Acceptance, E,TB,D, VU,NR by  at 8/6/2021 1134   Family Acceptance, E, VU by EM at 8/14/2021 1306    Acceptance, E, NR by CF at 8/12/2021 1610                   Point: Home exercise program (Done)     Learning Progress Summary           Patient Acceptance, E, VU by EM at 8/14/2021 1306   Family Acceptance, E, VU by EM at 8/14/2021 1306                   Point: Body mechanics (In Progress)     Learning Progress Summary           Patient Acceptance, E, NR by CF at 8/12/2021 1610    Acceptance, E,TB,D, VU,NR by  at 8/6/2021 1134   Family Acceptance, E, NR by CF at 8/12/2021 1610                   Point: Precautions (In Progress)     Learning Progress Summary           Patient Acceptance, E, NR by  at 8/12/2021 1610    Acceptance, E, VU by MD at 8/10/2021 1536    Acceptance, E,TB,D, VU,NR by  at 8/6/2021 1134   Family Acceptance, E, NR by  at 8/12/2021 1610                               User Key     Initials Effective Dates Name Provider Type Discipline     06/16/21 -  Heaven Acosta, PT Physical Therapist PT    MD 06/16/21 -  Janice Hopkins, PT Physical Therapist PT    EM 06/16/21 -  Angela Arteaga PT Physical Therapist PT     06/16/21 -  Faina Kahn, PT Physical Therapist PT              PT Recommendation and Plan     Plan of Care Reviewed With: patient, family  Outcome Summary: patient resting in bed but arousable, family at the bedside, patient agreeable to PT. Patient performed a few ROM exercises in supine, bed mobility with maxAx2, sat up on EOB with CGA with flexed posture. Patient able to sit upright with cues for upright posture and cues to open her eyes, c/o pain in the back of her neck. Patient  with flat affect, demonstrates very limited ROM with exercise attempts, limited activity tolerance. Patient will require total assist for all mobility at home. Patient's family present for treatment session.     Time Calculation:   PT Charges     Row Name 08/14/21 1306             Time Calculation    Start Time  1125  -EM      Stop Time  1140  -EM      Time Calculation (min)  15 min  -EM      PT Received On  08/14/21  -EM      PT - Next Appointment  08/15/21  -EM      PT Goal Re-Cert Due Date  08/21/21  -EM         Time Calculation- PT    Total Timed Code Minutes- PT  15 minute(s)  -EM         Timed Charges    52483 - PT Therapeutic Activity Minutes  15  -EM         Total Minutes    Timed Charges Total Minutes  15  -EM       Total Minutes  15  -EM        User Key  (r) = Recorded By, (t) = Taken By, (c) = Cosigned By    Initials Name Provider Type    EM Angela Arteaga PT Physical Therapist        Therapy Charges for Today     Code Description Service Date Service Provider Modifiers Qty    32555266549 HC PT THERAPEUTIC ACT EA 15 MIN 8/14/2021 Angela Arteaga, PT GP 1    42777325065 HC PT THER SUPP EA 15 MIN 8/14/2021 Angela Arteaga, PT GP 1          PT G-Codes  Outcome Measure Options: AM-PAC 6 Clicks Basic Mobility (PT)  AM-PAC 6 Clicks Score (PT): 8  AM-PAC 6 Clicks Score (OT): 12  Modified [place] Scale: 5 - Severe disability.  Bedridden, incontinent, and requiring constant nursing care and attention.    Angela Arteaga PT  8/14/2021

## 2021-08-14 NOTE — PLAN OF CARE
Goal Outcome Evaluation:  Plan of Care Reviewed With: patient, family           Outcome Summary: patient resting in bed but arousable, family at the bedside, patient agreeable to PT. Patient performed a few ROM exercises in supine, bed mobility with maxAx2, sat up on EOB with CGA with flexed posture. Patient able to sit upright with cues for upright posture and cues to open her eyes, c/o pain in the back of her neck. Patient with flat affect, demonstrates very limited ROM with exercise attempts, limited activity tolerance. Patient will require total assist for all mobility at home. Patient's family present for treatment session.  Patient was not wearing a face mask during this therapy encounter. Therapist used appropriate personal protective equipment including eye protection, mask, and gloves.  Mask used was standard procedure mask. Appropriate PPE was worn during the entire therapy session. Hand hygiene was completed before and after therapy session. Patient is not in enhanced droplet precautions.

## 2021-08-15 NOTE — OUTREACH NOTE
Prep Survey      Responses   [facility] facility patient discharged from?  [place]   Is LACE score < 7 ?  No   Emergency Room discharge w/ pulse ox?  No   Eligibility  [facility]   Date of Admission  08/05/21   Date of Discharge  08/14/21   Discharge diagnosis  PAWAN HOLE  Subdural hematoma subacute on chronic Cerebral compression with midline shift   Does the patient have one of the following disease processes/diagnoses(primary or secondary)?  General Surgery   Does the patient have Home health ordered?  Yes   What is the Home health agency?   [facility]     Is there a DME ordered?  No   Prep survey completed?  Yes          Janice Mena RN

## 2021-08-16 ENCOUNTER — TRANSITIONAL CARE MANAGEMENT TELEPHONE ENCOUNTER (OUTPATIENT)
Dept: CALL CENTER | Facility: HOSPITAL | Age: 86
End: 2021-08-16

## 2021-08-16 NOTE — PROGRESS NOTES
"Subjective   Patient ID: Brittany Villeda is a 86 y.o. female is here today for post-op follow-up with a new head CT ordered to follow-up on SDH. Ms. Villeda had left-sided dusty holes for evacuation of left sided subacute on chronic SDH on 8/8/2021 by Dr. Callaway.      History of Present Illness     The patient is here today with her daughter who reports continued drowsiness.  The patient sleeps a lot but is easy to arouse.  She has little interest in food or drinking but will eat and drink with encouragement from the family.  She reports some episodes of dizziness and headache but they are not any more severe than what she had experienced while she was in the hospital.  The patient is currently in a wheelchair.  The daughter reports that she has not been very active and has been lying in bed for the most part.    The following portions of the patient's history were reviewed and updated as appropriate: allergies, current medications, past family history, past medical history, past social history, past surgical history and problem list.    Review of Systems   Constitutional: Positive for activity change.   Respiratory: Negative for chest tightness and shortness of breath.    Cardiovascular: Negative for chest pain.   Neurological: Positive for dizziness and headaches.       Objective     Vitals:    08/24/21 1626   BP: 118/76   Pulse: 68   Temp: 97.1 °F (36.2 °C)   Weight: 88.9 kg (196 lb)  Comment: In W/C, weight is stated   Height: 162.6 cm (64.02\")     Body mass index is 33.62 kg/m².      Physical Exam  Vitals reviewed.   Constitutional:       General: She is not in acute distress.     Appearance: She is well-developed. She is not toxic-appearing or diaphoretic.   HENT:      Head: Normocephalic.      Nose: Nose normal.      Mouth/Throat:      Mouth: Mucous membranes are moist.      Pharynx: Oropharynx is clear.   Eyes:      General:         Right eye: No discharge.         Left eye: No discharge.      " Conjunctiva/sclera: Conjunctivae normal.   Neck:      Trachea: No tracheal deviation.   Cardiovascular:      Rate and Rhythm: Normal rate.   Pulmonary:      Effort: Pulmonary effort is normal. No respiratory distress.   Abdominal:      General: There is no distension.      Palpations: Abdomen is soft.      Tenderness: There is no abdominal tenderness.   Musculoskeletal:         General: No tenderness. Normal range of motion.      Cervical back: Normal range of motion and neck supple.      Right lower leg: Edema (bilateral foot swelling) present.      Left lower leg: Edema present.   Skin:     General: Skin is warm and dry.      Findings: No erythema.      Comments: Cranial incisions x2 well approximated.  Sutures intact but appeared to be dissolving.  The posterior incision has some scab material which was gently removed with peroxide.  Old Monocryl drain stitch was removed as it was entangled in the patient's hair..   Neurological:      Mental Status: Mental status is at baseline.      GCS: GCS eye subscore is 4. GCS verbal subscore is 5. GCS motor subscore is 6.      Motor: No abnormal muscle tone.      Comments: Patient is sitting in a wheelchair dozing at intervals.  Awakens to verbal stimuli.  Oriented to person and place.  Converses appropriately.  Follows commands.   Psychiatric:         Behavior: Behavior is cooperative.         Thought Content: Thought content normal.       Assessment/Plan   Independent Review of Radiographic Studies:      I personally reviewed the images from the following studies.    I reviewed the CT imagings of the head without contrast performed today.  I reviewed these results with the daughter in the room and with Dr. Callaway over the phone as well.  The CT shows progression in the chronic hemispheric subdural collections left greater than right.  There is now 7 mm of midline shift left to right.  No evidence of acute hemorrhage.    Medical Decision Making:      Ms. Villeda is now 2  weeks out from left sided bur holes for evacuation of traumatic bilateral subacute on chronic subdural hematomas which were larger on the left side than on the right.  She did show some signs of improvement.  After the surgical drain was removed, she began to get a bit drowsy and was subsequently placed on a steroid taper with good response.  She had been on Keppra for a total of 2 weeks and that has since been discontinued.  She is still holding Coumadin.    Dr. Callaway had talked to the family once or twice during her hospitalization about middle meningeal artery embolization (MMA). The family had decided to hold off on the MMA while she was hospitalized. I discussed the MMA procedure again with the daughter.  I reminded her that today's repeat head CT shows further expansion in the size of the collections, no new bleeding.      The MMA procedure is not a cranial surgery rather it is an interventional procedure performed through either the femoral or radial artery. The benefit of the MMA is the interruption of the body's inflammatory response to the initial hemorrhage. The inflammatory process lends to further production of fluid thereby causing further expansion within the current subdural collection.  This process continues to replicate itself and can lead to further delay in resolution.  In short, middle meningeal artery embolization offers quicker resolution of chronic subdural hematoma collections.    The patient's daughter, who is also the main caregiver, was not interested in pursuing admission to the hospital (which was offered for MMA procedure) at this time.  She states that her mother is 86 years old and has wishes that she would like to discuss with her with her siblings present. The coumadin will continue to be held in light of the head CT findings today.      The daughter agreed to gather her siblings for a group telephone conversation with Dr. Callaway for further discussion and final decision on plan  of care from here. I have informed Dr. Callaway of this as well and he is in agreement with the plan as stated.      Diagnoses and all orders for this visit:    1. Subdural hemorrhage (CMS/HCC) (Primary)    2. Surgical followup visit      Return for Arrange telephone visit Dr. Callaway with family to discuss MMA procedure.

## 2021-08-16 NOTE — PROGRESS NOTES
LEFT PT VOICEMAIL REGARDING DR. RANDHAWA'S VERBAL OK FOR WOUND NURSE AND THAT I WILL CALL TO HOME HEALTH TO GET A NURSE OUT THERE. OK PER HIPAA.

## 2021-08-16 NOTE — PROGRESS NOTES
LEFT HOME HEALTH VOICEMAIL REGARDING TO GIVE THE OFFICE A CALL BACK FOR WOUND NURSE CONSULT. WILL CALL TOMORROW.

## 2021-08-16 NOTE — OUTREACH NOTE
Call Center TCM Note      Responses   [facility] patient discharged from?  [place]   Does the patient have one of the following disease processes/diagnoses(primary or secondary)?  General Surgery   TCM attempt successful?  Yes   Discharge diagnosis  PAWAN HOLE  Subdural hematoma subacute on chronic Cerebral compression with midline shift   Is patient permission given to speak with other caregiver?  Yes   Person spoke with today (if not patient) and relationship  dtr Amina Abebe reviewed with patient/caregiver?  Yes   Is the patient having any side effects they believe may be caused by any medication additions or changes?  No   Does the patient have all medications related to this admission filled (includes all antibiotics, pain medications, etc.)  Yes   Is the patient taking all medications as directed (includes completed medication regime)?  Yes   Does the patient have a follow up appointment scheduled with their surgeon?  Yes   Has the patient kept scheduled appointments due by today?  Yes   What is the Home health agency?   [facility]    Home health comments  Pt family reached out to [facility] over wkend and was told they would call today to sched.    Psychosocial issues?  No   Did the patient receive a copy of their discharge instructions?  Yes   Nursing interventions  Reviewed instructions with patient   What is the patient's perception of their health status since discharge?  Improving   Is the patient /caregiver able to teach back basic post-op care?  Continue use of incentive spirometry at least 1 week post discharge, Take showers only when approved by MD-sponge bathe until then, Do not remove steri-strips, Lifting as instructed by MD in discharge instructions, No tub bath, swimming, or hot tub until instructed by MD, Practice 'cough and deep breath', Drive as instructed by MD in discharge instructions, Keep incision areas clean,dry and protected   Is the patient/caregiver able to teach back signs  "and symptoms of incisional infection?  Pus or odor from incision, Increased redness, swelling or pain at the incisonal site, Increased drainage or bleeding, Fever, Incisional warmth   Is the patient/caregiver able to teach back steps to recovery at home?  Set small, achievable goals for return to baseline health, Practice good oral hygiene, Eat a well-balance diet, Rest and rebuild strength, gradually increase activity, Weigh daily, Make a list of questions for surgeon's appointment   If the patient is a current smoker, are they able to teach back resources for cessation?  Not a smoker   Is the patient/caregiver able to teach back the hierarchy of who to call/visit for symptoms/problems? PCP, Specialist, Home health nurse, Urgent Care, ED, 911  Yes   TCM call completed?  Yes   Wrap up additional comments  Pt sleeping, spoke w/dtr Amina. Pt is to baseline on speech, no h/a. Med changes in place. Pt has good family support. Big question is pt is sleeping alot. Dtr unsure what is \"normal\" in this circumstance. I urged dtr to call neurosgn for guidance. Pt ihas fwp w/PCP 08/30, will keep for now. CT & neuro sgn fwp on 08/24/2021.          Janice Oakes MA    8/16/2021, 11:15 EDT      "

## 2021-08-16 NOTE — PROGRESS NOTES
Case Management Discharge Note      Final Note: Home with [facility] via [facility]         Selected Continued Care - Discharged on 8/14/2021 Admission date: 8/5/2021 - Discharge disposition: Home or Self Care    Destination    No services have been selected for the patient.              Durable Medical Equipment    No services have been selected for the patient.              Dialysis/Infusion    No services have been selected for the patient.              Home Medical Care Coordination complete.    Service Provider Selected Services Address Phone Fax Patient Preferred    [facility] [address] [place] [id number] [phone] --          Therapy    No services have been selected for the patient.              Community Resources    No services have been selected for the patient.              Community & DME    No services have been selected for the patient.                Selected Continued Care - Prior Encounters Includes selections from prior encounters from 5/7/2021 to 8/14/2021    Discharged on 6/23/2021 Admission date: 6/22/2021 - Discharge disposition: Home-Health Care [facility]    Home Medical Care     Service Provider Selected Services Address Phone Fax Patient Preferred    [facility] - [facility] [address] [place] [id number] [phone] --                Discharged on 5/14/2021 Admission date: 5/9/2021 - Discharge disposition: Home or Self Care    Home Medical Care     Service Provider Selected Services Address Phone Fax Patient Preferred    [facility] - [facility] [address] [place] [phone] --                    Transportation Services  Ambulance: [facility]    Final Discharge Disposition Code: 06 - home with home health care

## 2021-08-17 NOTE — PROGRESS NOTES
SPOKE TO HOME HEALTH AND SHE STATED THAT THE PT WAS CHECKING ON IT TODAY AND WILL DETERMINE IF A WOUND NURSE IS NEEDED.

## 2021-08-18 ENCOUNTER — TELEPHONE (OUTPATIENT)
Dept: FAMILY MEDICINE CLINIC | Facility: CLINIC | Age: 86
End: 2021-08-18

## 2021-08-18 NOTE — TELEPHONE ENCOUNTER
[facility] CALLED TO OBTAIN VERBAL ORDERS FOR THERAPY  2 X 4 WEEKS FOR STRENGTHENING,  BED MOBILITY AND TRANSFERS. ALSO NEEDS A VERBAL ORDER FOR A NURSING EVAL FOR WOUND CARE       [place]/ [facility]     [phone]

## 2021-08-23 ENCOUNTER — TELEPHONE (OUTPATIENT)
Dept: NEUROSURGERY | Facility: CLINIC | Age: 86
End: 2021-08-23

## 2021-08-24 ENCOUNTER — READMISSION MANAGEMENT (OUTPATIENT)
Dept: CALL CENTER | Facility: HOSPITAL | Age: 86
End: 2021-08-24

## 2021-08-24 ENCOUNTER — HOSPITAL ENCOUNTER (OUTPATIENT)
Dept: CT IMAGING | Facility: HOSPITAL | Age: 86
Discharge: HOME | End: 2021-08-24
Admitting: NURSE PRACTITIONER

## 2021-08-24 ENCOUNTER — OFFICE VISIT (OUTPATIENT)
Dept: NEUROSURGERY | Facility: CLINIC | Age: 86
End: 2021-08-24

## 2021-08-24 VITALS
BODY MASS INDEX: 33.46 KG/M2 | HEIGHT: 64 IN | SYSTOLIC BLOOD PRESSURE: 118 MMHG | TEMPERATURE: 97.1 F | DIASTOLIC BLOOD PRESSURE: 76 MMHG | HEART RATE: 68 BPM | WEIGHT: 196 LBS

## 2021-08-24 DIAGNOSIS — I62.00 SUBDURAL HEMORRHAGE (HCC): Primary | ICD-10-CM

## 2021-08-24 DIAGNOSIS — I62.00 SUBDURAL HEMORRHAGE (HCC): ICD-10-CM

## 2021-08-24 DIAGNOSIS — Z09 SURGICAL FOLLOWUP VISIT: ICD-10-CM

## 2021-08-24 PROCEDURE — 99024 POSTOP FOLLOW-UP VISIT: CPT | Performed by: NURSE PRACTITIONER

## 2021-08-24 PROCEDURE — 70450 CT HEAD/BRAIN W/O DYE: CPT

## 2021-08-24 NOTE — OUTREACH NOTE
General Surgery Week 2 Survey      Responses   [facility] patient discharged from?  [place]   Does the patient have one of the following disease processes/diagnoses(primary or secondary)?  General Surgery   Week 2 attempt successful?  Yes   Call start time  1558   Call end time  1559   Discharge diagnosis  PAWAN HOLE  Subdural hematoma subacute on chronic Cerebral compression with midline shift   Is patient permission given to speak with other caregiver?  Yes   List who call center can speak with  daughter- Amina   Person spoke with today (if not patient) and relationship  daughter- Amina   Is the patient taking all medications as directed (includes completed medication regime)?  Yes   Does the patient have a follow up appointment scheduled with their surgeon?  Yes   Has the patient kept scheduled appointments due by today?  Yes   Comments  f/u with surgeon today   What is the Home health agency?   [facility]    Has home health visited the patient within 72 hours of discharge?  Yes   What is the patient's perception of their health status since discharge?  Improving   Is the patient/caregiver able to teach back the hierarchy of who to call/visit for symptoms/problems? PCP, Specialist, Home health nurse, Urgent Care, ED, 911  Yes   Week 2 call completed?  Yes   Wrap up additional comments  Quick call with daughter, they were waiting to be seen by Dr. Velazquez.          Radha Falcon RN

## 2021-08-26 ENCOUNTER — TELEPHONE (OUTPATIENT)
Dept: NEUROSURGERY | Facility: CLINIC | Age: 86
End: 2021-08-26

## 2021-08-26 NOTE — TELEPHONE ENCOUNTER
Home physical therapist called wanting to know about doing PT. She has been doing bedside exercises with family there and she said she noticed she not as talkative as before.  She said she does not have anymore PT visits until after her televisit next week but she was wanting to know about additional therapy with her.

## 2021-08-27 NOTE — TELEPHONE ENCOUNTER
I spoke to physical therapist let her know that Dr Callaway does want her to continue with home PT. Therapist said ok the next visit is scheduled for after the tele-visit with Dr. Callaway.

## 2021-08-30 ENCOUNTER — TELEMEDICINE (OUTPATIENT)
Dept: FAMILY MEDICINE CLINIC | Facility: CLINIC | Age: 86
End: 2021-08-30

## 2021-08-30 DIAGNOSIS — I61.9 NONTRAUMATIC INTRACEREBRAL HEMORRHAGE, UNSPECIFIED CEREBRAL LOCATION, UNSPECIFIED LATERALITY (HCC): Chronic | ICD-10-CM

## 2021-08-30 DIAGNOSIS — Z09 HOSPITAL DISCHARGE FOLLOW-UP: Primary | ICD-10-CM

## 2021-08-30 DIAGNOSIS — L89.309 PRESSURE INJURY OF SKIN OF BUTTOCK, UNSPECIFIED INJURY STAGE, UNSPECIFIED LATERALITY: ICD-10-CM

## 2021-08-30 PROCEDURE — 99214 OFFICE O/P EST MOD 30 MIN: CPT | Performed by: FAMILY MEDICINE

## 2021-08-30 RX ORDER — CLOTRIMAZOLE 1 %
CREAM (GRAM) TOPICAL 2 TIMES DAILY
Qty: 60 G | Refills: 0 | Status: SHIPPED | OUTPATIENT
Start: 2021-08-30 | End: 2021-11-10 | Stop reason: ALTCHOICE

## 2021-08-30 NOTE — PROGRESS NOTES
Chief Complaint  Hospital Follow Up Visit    Wendi Villeda presents to [facility]  History of Present Illness     MyChart video visit.  Doximity visit.  Good A/V quality.  Patient gave consent to treatment and evaluation over video today.    Hospital discharge follow-up.  Intracranial bleeding.  Very likely related to warfarin use.  Previously.... This summer... Patient had prednisone prescribed for PMR symptoms.  Patient felt much better on the low-dose prednisone but given the warfarin use, this may have led to hemorrhagic gastritis.  The prednisone was stopped couple months ago during the hospital stay.  The warfarin was restarted by the discharging physician.  The warfarin continued to be monitored by cardiology.    She saw her neurosurgeon a few days ago.  They are discussing a further procedure.  The patient does not want to go back to [facility] she stated.  She told me today she is concerned about the bedsore she got there.  Family is also concerned.  They are having home health come in.  But they are not sure if there is a wound care specialist coming in.  Patient is now bedbound.    She is less tired now that they have stopped the Keppra.  There is been no seizure activity.  She had some constipation but now improved with Colace.  No trouble with urination.    Patient has some bedsore pain but otherwise no other discomfort.  Headaches are improved.    She is currently taking new medication.  She is off her blood pressure medication.  Blood pressure at home recently has been running 140/60, 103/60, 104/65.  She is off the Lasix.  She is taking pantoprazole.    Objective   Vital Signs:   There were no vitals taken for this visit.    Physical Exam  Constitutional:       Comments: She appears in no acute distress.  She is alert.  She is active and talking to me.  Her face is symmetric.  The speech appears unremarkable.  She appears no acute  distress.  Her color looks normal.        Result Review :   The following data was reviewed by: Mega Esparza MD on 08/30/2021:  Common labs    Common Labsle 8/12/21 8/12/21 8/13/21 8/13/21 8/14/21    0543 1246 0541 0541    Glucose 93 107 (A) 92  94   BUN 10 11 9  8   Creatinine 0.45 (A) 0.54 (A) 0.48 (A)  0.47 (A)   eGFR  Am >150 130 149  >150   Sodium 132 (A) 131 (A) 130 (A)  131 (A)   Potassium 4.6 4.9 4.8  4.3   Chloride 100 99 99  102   Calcium 10.0 10.3 9.7  9.7   Albumin 2.70 (A)  2.70 (A)     WBC    6.88    Hemoglobin    11.0 (A)    Hematocrit    33.4 (A)    Platelets    219    (A) Abnormal value            Data reviewed: Recent hospitalization notes Including discharge summary.  Also reviewed recent neurosurgical notes.          Assessment and Plan    Diagnoses and all orders for this visit:    1. Hospital discharge follow-up (Primary)    2. Nontraumatic intracerebral hemorrhage, unspecified cerebral location, unspecified laterality (CMS/[facility])    3. Pressure injury of skin of buttock, unspecified injury stage, unspecified laterality  -     Ambulatory Referral to Home Health    Other orders  -     clotrimazole (LOTRIMIN) 1 % cream; Apply  topically to the appropriate area as directed 2 (Two) Times a Day.  Dispense: 60 g; Refill: 0      Hospital discharge follow-up for intracerebral bleed.  Very likely related to warfarin use.  And warfarin has been discontinued.  Indication for warfarin was atrial fibrillation with elevated stroke risk.  Now the risk of warfarin use outweighs benefits.  They will continue to follow with neurosurgery.    Sacral decubitus.  Also intertriginous inflammation of the groin area.  They will continue the zinc oxide cream.  I am adding Chlortrimazole cream.  Will send to their pharmacy.  Referral to home health wound care also made today.    6-week follow-up via video visit.    Long discussion today with the daughter and patient.  Patient states she does not want to go back  to the hospital for any reason.  Even if it meant she would die at home she would rather stay at home and die than go to the hospital.  I strongly believe the patient has the capacity to make this decision.  I also discussed this with her daughter who is also on the telephone/video call.  She agrees with what her mother requests.     Total time of encounter 35 minutes.        Follow Up   No follow-ups on file.  Patient was given instructions and counseling regarding her condition or for health maintenance advice. Please see specific information pulled into the AVS if appropriate.

## 2021-09-01 ENCOUNTER — TELEPHONE (OUTPATIENT)
Dept: NEUROSURGERY | Facility: CLINIC | Age: 86
End: 2021-09-01

## 2021-09-01 ENCOUNTER — OFFICE VISIT (OUTPATIENT)
Dept: NEUROSURGERY | Facility: CLINIC | Age: 86
End: 2021-09-01

## 2021-09-01 ENCOUNTER — READMISSION MANAGEMENT (OUTPATIENT)
Dept: CALL CENTER | Facility: HOSPITAL | Age: 86
End: 2021-09-01

## 2021-09-01 DIAGNOSIS — I62.00 SUBDURAL HEMORRHAGE (HCC): Primary | ICD-10-CM

## 2021-09-01 DIAGNOSIS — Z09 SURGICAL FOLLOWUP VISIT: ICD-10-CM

## 2021-09-01 PROCEDURE — 99024 POSTOP FOLLOW-UP VISIT: CPT | Performed by: NEUROLOGICAL SURGERY

## 2021-09-01 NOTE — OUTREACH NOTE
"General Surgery Week 3 Survey      Responses   [facility]   Does the patient have one of the following disease processes/diagnoses(primary or secondary)?  General Surgery   Week 3 attempt successful?  Yes   Call start time  0807   Call end time  0815   Is patient permission given to speak with other caregiver?  Yes   List who call center can speak with  CONCEPCION SOSA   Person spoke with today (if not patient) and relationship  CONCEPCION SOSA   What is the patient's perception of their health status since discharge?  New symptoms unrelated to diagnosis [DAUGHTER STATES PATIENT HAS A PRESSURE SORE ON HER COCCYX AND \"SOMETHING ON THE SKIN BETWEEN HER LEGS.\" ]   Week 3 call completed?  Yes   Wrap up additional comments  SURVEY QUESTIONS UNABLE TO BE DONE DUE TO DAUGHTER EXPRESSING CONCERNS AND DISSATISFACTION WITH PATIENT'S CARE. EMAIL TO BE SENT TO PATIENT ADVOCATE/GREIVANCE DEPARTMENT AT [facility], AND ASK THEM TO REACH OUT TO PATIENT'S DAUGHTER.          Zoë Mojica, BURT  "

## 2021-09-01 NOTE — TELEPHONE ENCOUNTER
I spoke to Catie (Physical therapist) I let her know that Dr. Callaway would like her to continue the home PT with bed exercises lifting arms, legs and relieving pressure off of her buttock and legs. I also spoke to daughter Amina and let her know and she has agreed to continue therapy with her mom.

## 2021-09-01 NOTE — TELEPHONE ENCOUNTER
Catie Ramirez from [facility] called about physical therapy on this pt.  She does not feel it is appropriate given the pt's issues (SDH),  but the daughter seems to think it makes her mother feel better.  Can you check on this please?  [phone]

## 2021-09-08 ENCOUNTER — READMISSION MANAGEMENT (OUTPATIENT)
Dept: CALL CENTER | Facility: HOSPITAL | Age: 86
End: 2021-09-08

## 2021-09-08 NOTE — OUTREACH NOTE
General Surgery Week 4 Survey      Responses   [facility] patient discharged from?  [place]   Does the patient have one of the following disease processes/diagnoses(primary or secondary)?  General Surgery   Week 4 attempt successful?  Yes   Call start time  1305   Call end time  1310   Discharge diagnosis  PAWAN HOLE  Subdural hematoma subacute on chronic Cerebral compression with midline shift   Is patient permission given to speak with other caregiver?  Yes   List who call center can speak with  Amina, daughter   Person spoke with today (if not patient) and relationship  Amina, daughter   Is the patient taking all medications as directed (includes completed medication regime)?  Yes   Has the patient kept scheduled appointments due by today?  Yes   Is the patient still receiving Home Health Services?  Yes   Psychosocial issues?  No   What is the patient's perception of their health status since discharge?  Improving   Is the patient/caregiver able to teach back the hierarchy of who to call/visit for symptoms/problems? PCP, Specialist, Home health nurse, Urgent Care, ED, 911  Yes   Week 4 call completed?  Yes   Would the patient like one additional call?  No   Graduated  Yes   Wrap up additional comments  Attempted other phone #'s as Amina requested to speak to patient and other daughter, but no answer.           Janice Webster RN

## 2021-09-16 ENCOUNTER — TELEPHONE (OUTPATIENT)
Dept: FAMILY MEDICINE CLINIC | Facility: CLINIC | Age: 86
End: 2021-09-16

## 2021-09-16 NOTE — TELEPHONE ENCOUNTER
Caller: KATELYN RUTH    Relationship: [facility]    Best call back number:     What orders are you requesting (i.e. lab or imaging):     In what timeframe would the patient need to come in:     Where will you receive your lab/imaging services:     Additional notes: KATELYN WITH [facility] IS CALLING IN TO GET VERBAL ORDERS TO CONTINUE SEEING PATIENT FOR PHYSICAL THERAPY.

## 2021-09-17 ENCOUNTER — TELEPHONE (OUTPATIENT)
Dept: FAMILY MEDICINE CLINIC | Facility: CLINIC | Age: 86
End: 2021-09-17

## 2021-09-17 NOTE — TELEPHONE ENCOUNTER
Daria with [facility] is calling to request verbal orders for Nursing. They are going in two times a week for two weeks for a small wound on her bottom.     Please advise.    Her phone number is  [phone]

## 2021-10-01 ENCOUNTER — HOSPITAL ENCOUNTER (OUTPATIENT)
Dept: CT IMAGING | Facility: HOSPITAL | Age: 86
Discharge: HOME | End: 2021-10-01
Admitting: NEUROLOGICAL SURGERY

## 2021-10-01 DIAGNOSIS — Z09 SURGICAL FOLLOWUP VISIT: ICD-10-CM

## 2021-10-01 DIAGNOSIS — I62.00 SUBDURAL HEMORRHAGE (HCC): ICD-10-CM

## 2021-10-01 PROCEDURE — 70450 CT HEAD/BRAIN W/O DYE: CPT

## 2021-10-04 ENCOUNTER — TELEPHONE (OUTPATIENT)
Dept: NEUROSURGERY | Facility: CLINIC | Age: 86
End: 2021-10-04

## 2021-10-04 NOTE — TELEPHONE ENCOUNTER
"  Yes and she was scheduled on 9/27 with Dr. Callaway but is was cancelled by \"the interface\" She will need to be rescheduled but can be a telephone visit  ----- Message from Gladis Guzman MA sent at 10/4/2021  9:56 AM EDT -----  Regarding: FW: Visit Follow-Up Question  Contact: [phone]  Was she suppose to have a follow up after her CT?   ----- Message -----  From: Brittany Villeda  Sent: 10/4/2021   9:16 AM EDT  To: [facility]  Subject: RE: Visit Follow-Up Question                     I've read the test results but I don't understand what it means    ----- Message -----  From: DERIAN VERNON  Sent: 10/4/21 9:07 AM  To: Brittany Villeda  Subject: RE: Visit Follow-Up Question    What is your question?      ----- Message -----       From:Brittany Villeda       Sent:10/3/2021  4:13 PM EDT         To:Gustavo Callaway MD    Subject:Visit Follow-Up Question    What does this mean ??        "

## 2021-10-06 ENCOUNTER — OFFICE VISIT (OUTPATIENT)
Dept: NEUROSURGERY | Facility: CLINIC | Age: 86
End: 2021-10-06

## 2021-10-06 DIAGNOSIS — I62.00 SUBDURAL HEMORRHAGE (HCC): Primary | ICD-10-CM

## 2021-10-06 PROCEDURE — 99024 POSTOP FOLLOW-UP VISIT: CPT | Performed by: NEUROLOGICAL SURGERY

## 2021-10-06 NOTE — PROGRESS NOTES
Subjective   History of Present Illness: Brittany Villeda is a 86 y.o. female is here today for tele-visit follow-up after CT head 10/1/21.  Her daughter states she is doing well. She is still getting PT twice a week. They are working on her standing.  She reports that she has had some tremor in her right hand as well as some weakness when holding a pen.  She reports that the symptoms have been present for about a week.  She denies any neck pain or pain rating down her upper extremity.  She denies any weakness in the left upper extremity.  Denies a speech difficulty.  She does not report any other neurologic symptoms.    You have chosen to receive care through a telephone visit. Do you consent to use a telephone visit for your medical care today? Yes    History of Present Illness    The following portions of the patient's history were reviewed and updated as appropriate: allergies, past family history, past medical history, past social history, past surgical history and problem list.    Past Medical History:   Diagnosis Date   • Acute UTI (urinary tract infection) 4/8/2021   • Anemia    • Arrhythmia    • Arthritis    • Asthma    • Bradycardia    • Chest pain    • Choledocholithiasis 5/9/2021   • Colitis    • COPD (chronic obstructive pulmonary disease) ([facility])    • Diastolic dysfunction    • Essential hypertension 5/12/2016   • GERD (gastroesophageal reflux disease)    • Heart block    • Hypertension    • Hypertensive heart disease    • Hyperthyroidism    • Kidney stone    • Leukopenia    • Low back pain    • MGUS (monoclonal gammopathy of unknown significance)    • Nephrolithiasis    • Obesity    • Paroxysmal atrial fibrillation (HCC)    • Peptic ulceration    • Persistent atrial fibrillation (HCC)    • PSVT (paroxysmal supraventricular tachycardia) ([facility])    • Pulmonary hypertension ([facility])    • Rectal bleed    • Sleep apnea    • Systemic hypertension    • Trifascicular block    • Ulcerative rectosigmoiditis without  complication (HCC)    • Ventricular tachycardia (HCC)     nonsustained   • Vertigo         Past Surgical History:   Procedure Laterality Date   • BACK SURGERY      lumbar fusion   • DUSTY HOLE Left 8/8/2021    Procedure: Left-sided dusty holes for evacuation of subdural hematoma;  Surgeon: Gustavo Callaway MD;  Location: [facility] MAIN OR;  Service: Neurosurgery;  Laterality: Left;   • CARDIAC ELECTROPHYSIOLOGY PROCEDURE N/A 11/7/2018    Procedure: Pacemaker DC new   [place];  Surgeon: Jesus Granda MD;  Location: [facility] CATH INVASIVE LOCATION;  Service: Cardiology   • CHOLECYSTECTOMY     • COLONOSCOPY  06/16/2014    colitis, cryptitis,  tics, NBIH, TA w/low grade dysplasia   • COLONOSCOPY N/A 10/28/2019    Procedure: COLONOSCOPY WITH COLD AND HOT POLYPECTOMIES;  Surgeon: Micaela English MD;  Location: [facility] ENDOSCOPY;  Service: Gastroenterology   • ENDOSCOPY N/A 5/13/2021    Procedure: ESOPHAGOGASTRODUODENOSCOPY with BX;  Surgeon: Stefan Mcfadden MD;  Location: [facility] ENDOSCOPY;  Service: Gastroenterology;  Laterality: N/A;  EPIGASTRIC PAIN  --DUODENAL ULCER, HEMORRHAGIC GASTRITIS, HIATAL HERNIA    • HEMORRHOIDECTOMY     • HYSTERECTOMY     • JOINT REPLACEMENT     • KNEE ARTHROPLASTY     • MYOMECTOMY     • PACEMAKER IMPLANTATION     • SHOULDER SURGERY     • SINUS SURGERY     • TOE NAIL AMPUTATION  03/04/2019   • TONSILLECTOMY            Current Outpatient Medications:   •  acetaminophen (TYLENOL) 500 MG tablet, Take 1 tablet by mouth Every 6 (Six) Hours As Needed for Mild Pain ., Disp: , Rfl:   •  docusate sodium (COLACE) 100 MG capsule, Take 100 mg by mouth Every Night., Disp: , Rfl:   •  magnesium oxide (MAG-OX) 400 MG tablet, Take 1 tablet by mouth Daily., Disp: 30 tablet, Rfl: 0  •  mesalamine (APRISO) 0.375 g 24 hr capsule, TAKE 4 CAPSULES DAILY, Disp: 360 capsule, Rfl: 0  •  pantoprazole (Protonix) 40 MG EC tablet, Take 1 tablet by mouth 2 (Two) Times a Day., Disp: 90 tablet, Rfl: 1  •  vitamin  B-12 (CYANOCOBALAMIN) 1000 MCG tablet, Take 1,000 mcg by mouth Daily., Disp: , Rfl:   •  clotrimazole (LOTRIMIN) 1 % cream, Apply  topically to the appropriate area as directed 2 (Two) Times a Day., Disp: 60 g, Rfl: 0     Allergies   Allergen Reactions   • Codeine Hallucinations     Tolerates hydromorphone   • Amitriptyline Rash   • Amoxicillin-Pot Clavulanate Rash   • Aspirin Unknown - Low Severity     Patient doesn't know why   • Bactrim [Sulfamethoxazole-Trimethoprim] Rash   • Carisoprodol-Aspirin-Codeine Palpitations   • Iodinated Diagnostic Agents Rash   • Latex Rash   • Naproxen Rash   • Nsaids Unknown - Low Severity     unknown   • Soma Compound With Codeine [Carisoprodol-Aspirin-Codeine] Rash   • Sulfa Antibiotics Rash   • Tramadol Palpitations     heart races         Social History     Socioeconomic History   • Marital status:      Spouse name: Not on file   • Number of children: 10   • Years of education: High School   • Highest education level: Not on file   Tobacco Use   • Smoking status: Former Smoker     Packs/day: 1.50     Years: 10.00     Pack years: 15.00     Start date:      Quit date:      Years since quittin.8   • Smokeless tobacco: Never Used   • Tobacco comment: QUIT SMOKING    Vaping Use   • Vaping Use: Never used   Substance and Sexual Activity   • Alcohol use: No   • Drug use: Defer   • Sexual activity: Defer        Family History   Problem Relation Age of Onset   • Diabetes Mother    • Breast cancer Sister    • Kidney cancer Sister    • Heart disease Sister    • Prostate cancer Brother    • Prostate cancer Brother    • Prostate cancer Brother    • Malig Hyperthermia Neg Hx         Review of Systems    Objective     There were no vitals filed for this visit.  There is no height or weight on file to calculate BMI.      Physical Exam  Neurologic Exam  No physical exam because this was a telephone visit      Assessment/Plan   Independent Review of Radiographic Studies:       I personally reviewed the images from the following studies.  CT head without contrast from 2021 and 2021  The CT head from  shows a significant reduction in the volume of the left-sided chronic subdural hematoma.  The left frontal subdural hematoma measures approximately 5 mm in thickness and was previously 11 mm.  There is 2 small areas of hyperdensity concerning for newer blood.    Medical Decision Makin-year-old female status post left-sided dusty holes on 2021  -She has recovered from her bur hole procedure and is continuing physical therapy.  Her daughters report that she is doing well.  She has indicated that she has a tremor in her right hand and some difficulty gripping a pen when writing.  I will plan to order an MRI to evaluate for any underlying infarcts.  -She was previously on Coumadin for atrial fibrillation, but her daughter reports that this was started before she was given a pacemaker.  I have asked her to follow-up with her cardiologist for further evaluation on the extent of her A. fib and if she needs to continue to be on Coumadin.  From my standpoint it is okay for her to restart Coumadin if needed to reduce the chances of strokes.  -I will plan to follow-up with her in 4 to 6 weeks after her MRI is complete.  I have again offered her a left-sided middle meningeal artery embolization.  I think that this procedure would be helpful if she has to restart her Coumadin as it would reduce the chances of further expansion of the subdural hematoma.    This was a telephone visit and we spoke for 5 minutes.   Diagnoses and all orders for this visit:    1. Subdural hemorrhage (HCC) (Primary)  -     MRI Brain Without Contrast; Future      Return in about 4 weeks (around 11/3/2021).

## 2021-10-07 ENCOUNTER — TELEPHONE (OUTPATIENT)
Dept: NEUROSURGERY | Facility: CLINIC | Age: 86
End: 2021-10-07

## 2021-10-08 ENCOUNTER — TELEPHONE (OUTPATIENT)
Dept: PHARMACY | Facility: HOSPITAL | Age: 86
End: 2021-10-08

## 2021-10-08 NOTE — TELEPHONE ENCOUNTER
"Dr. Culp,     Ms. Villeda suffered a subdural hematoma 8/2021 (see 8/5/21 hospitalization). Warfarin has been held since that time.     Per 10/6/21 Neurosurgery office visit, Dr. Callaway noted the following: \"The CT head from October 1 shows a significant reduction in the volume of the left-sided chronic subdural hematoma.  The left frontal subdural hematoma measures approximately 5 mm in thickness and was previously 11 mm.  There is 2 small areas of hyperdensity concerning for newer blood.....She was previously on Coumadin for atrial fibrillation, but her daughter reports that this was started before she was given a pacemaker.  I have asked her to follow-up with her cardiologist for further evaluation on the extent of her A. fib and if she needs to continue to be on Coumadin.  From my standpoint it is okay for her to restart Coumadin if needed to reduce the chances of strokes.\"    As Dr. Callaway has asked that Ms. Villeda follow up with Cardiology before resuming warfarin, we appreciate your review and input. We will be happy to proceed according to your recommendation.     Thank you in advance!  Rusty  "

## 2021-10-11 PROBLEM — Z79.01 CHRONIC ANTICOAGULATION: Chronic | Status: RESOLVED | Noted: 2020-11-20 | Resolved: 2021-10-11

## 2021-10-11 NOTE — TELEPHONE ENCOUNTER
Yes if they activate the MYCHART and the pt is NOT having cardiac symptoms (palpitations, swelling, SOA, dizziness, chest pain, or fatigue).  If yes then please let me know.

## 2021-10-11 NOTE — TELEPHONE ENCOUNTER
I spoke with patient's daughter and she is wondering if this could be a virtual visit as transportation is difficult to arrange. Please advise.     Thank you  Carmen

## 2021-10-18 NOTE — TELEPHONE ENCOUNTER
Left voicemail for patient's daughter, Amina to call the office and schedule.     Thank you  Carmen

## 2021-10-19 ENCOUNTER — TELEPHONE (OUTPATIENT)
Dept: CARDIOLOGY | Facility: CLINIC | Age: 86
End: 2021-10-19

## 2021-10-20 DIAGNOSIS — E83.42 HYPOMAGNESEMIA: ICD-10-CM

## 2021-10-20 RX ORDER — MAGNESIUM OXIDE 400 MG/1
400 TABLET ORAL DAILY
Qty: 90 TABLET | Refills: 0 | Status: SHIPPED | OUTPATIENT
Start: 2021-10-20 | End: 2021-12-21

## 2021-10-20 NOTE — TELEPHONE ENCOUNTER
Rx Refill Note  Requested Prescriptions     Pending Prescriptions Disp Refills   • magnesium oxide (MAG-OX) 400 MG tablet 90 tablet 1     Sig: Take 1 tablet by mouth Daily.      Last office visit with prescribing clinician: 7/29/2021      Next office visit with prescribing clinician: 1/31/2022            Clarissa Veliz MA  10/20/21, 14:31 EDTRx Refill Note  Requested Prescriptions     Pending Prescriptions Disp Refills   • magnesium oxide (MAG-OX) 400 MG tablet 90 tablet 1     Sig: Take 1 tablet by mouth Daily.      Last office visit with prescribing clinician: 7/29/2021      Next office visit with prescribing clinician: 1/31/2022            Clarissa Veliz MA  10/20/21, 14:31 EDT

## 2021-10-21 NOTE — TELEPHONE ENCOUNTER
I spoke with patient's daughter, Amina and she stated no active cardiac symptoms. Patient is scheduled 11/10 for video visit with Milady.     Thank you  Carmen

## 2021-10-22 ENCOUNTER — FLU SHOT (OUTPATIENT)
Dept: FAMILY MEDICINE CLINIC | Facility: CLINIC | Age: 86
End: 2021-10-22

## 2021-10-22 DIAGNOSIS — Z23 NEED FOR VACCINATION: Primary | ICD-10-CM

## 2021-10-22 PROCEDURE — 90662 IIV NO PRSV INCREASED AG IM: CPT | Performed by: FAMILY MEDICINE

## 2021-10-22 PROCEDURE — G0008 ADMIN INFLUENZA VIRUS VAC: HCPCS | Performed by: FAMILY MEDICINE

## 2021-10-25 ENCOUNTER — APPOINTMENT (OUTPATIENT)
Dept: GENERAL RADIOLOGY | Facility: HOSPITAL | Age: 86
End: 2021-10-25

## 2021-10-25 ENCOUNTER — HOSPITAL ENCOUNTER (EMERGENCY)
Facility: HOSPITAL | Age: 86
Discharge: HOME | End: 2021-10-25
Attending: EMERGENCY MEDICINE | Admitting: EMERGENCY MEDICINE

## 2021-10-25 ENCOUNTER — APPOINTMENT (OUTPATIENT)
Dept: CT IMAGING | Facility: HOSPITAL | Age: 86
End: 2021-10-25

## 2021-10-25 VITALS
HEART RATE: 71 BPM | TEMPERATURE: 97 F | DIASTOLIC BLOOD PRESSURE: 72 MMHG | SYSTOLIC BLOOD PRESSURE: 137 MMHG | RESPIRATION RATE: 16 BRPM | OXYGEN SATURATION: 96 %

## 2021-10-25 DIAGNOSIS — S20.212A CONTUSION OF LEFT CHEST WALL, INITIAL ENCOUNTER: Primary | ICD-10-CM

## 2021-10-25 LAB
ALBUMIN SERPL-MCNC: 3.5 G/DL (ref 3.5–5.2)
ALBUMIN/GLOB SERPL: 0.9 G/DL
ALP SERPL-CCNC: 77 U/L (ref 39–117)
ALT SERPL W P-5'-P-CCNC: 8 U/L (ref 1–33)
ANION GAP SERPL CALCULATED.3IONS-SCNC: 7.3 MMOL/L (ref 5–15)
ANISOCYTOSIS BLD QL: ABNORMAL
AST SERPL-CCNC: 14 U/L (ref 1–32)
BILIRUB SERPL-MCNC: 1.2 MG/DL (ref 0–1.2)
BUN SERPL-MCNC: 8 MG/DL (ref 8–23)
BUN/CREAT SERPL: 10.8 (ref 7–25)
CALCIUM SPEC-SCNC: 10.5 MG/DL (ref 8.6–10.5)
CHLORIDE SERPL-SCNC: 106 MMOL/L (ref 98–107)
CO2 SERPL-SCNC: 26.7 MMOL/L (ref 22–29)
CREAT SERPL-MCNC: 0.74 MG/DL (ref 0.57–1)
DEPRECATED RDW RBC AUTO: 52.6 FL (ref 37–54)
EOSINOPHIL # BLD MANUAL: 0.04 10*3/MM3 (ref 0–0.4)
EOSINOPHIL NFR BLD MANUAL: 1 % (ref 0.3–6.2)
ERYTHROCYTE [DISTWIDTH] IN BLOOD BY AUTOMATED COUNT: 14 % (ref 12.3–15.4)
GFR SERPL CREATININE-BSD FRML MDRD: 90 ML/MIN/1.73
GLOBULIN UR ELPH-MCNC: 3.9 GM/DL
GLUCOSE SERPL-MCNC: 93 MG/DL (ref 65–99)
HCT VFR BLD AUTO: 41.5 % (ref 34–46.6)
HGB BLD-MCNC: 13.3 G/DL (ref 12–15.9)
HOLD SPECIMEN: NORMAL
LYMPHOCYTES # BLD MANUAL: 1.91 10*3/MM3 (ref 0.7–3.1)
LYMPHOCYTES NFR BLD MANUAL: 4.1 % (ref 5–12)
LYMPHOCYTES NFR BLD MANUAL: 53.1 % (ref 19.6–45.3)
MACROCYTES BLD QL SMEAR: ABNORMAL
MCH RBC QN AUTO: 31.9 PG (ref 26.6–33)
MCHC RBC AUTO-ENTMCNC: 32 G/DL (ref 31.5–35.7)
MCV RBC AUTO: 99.5 FL (ref 79–97)
MONOCYTES # BLD AUTO: 0.15 10*3/MM3 (ref 0.1–0.9)
NEUTROPHILS # BLD AUTO: 1.5 10*3/MM3 (ref 1.7–7)
NEUTROPHILS NFR BLD MANUAL: 41.8 % (ref 42.7–76)
PLAT MORPH BLD: NORMAL
PLATELET # BLD AUTO: 147 10*3/MM3 (ref 140–450)
PMV BLD AUTO: 9.7 FL (ref 6–12)
POTASSIUM SERPL-SCNC: 4.4 MMOL/L (ref 3.5–5.2)
PROT SERPL-MCNC: 7.4 G/DL (ref 6–8.5)
QT INTERVAL: 455 MS
RBC # BLD AUTO: 4.17 10*6/MM3 (ref 3.77–5.28)
SMUDGE CELLS BLD QL SMEAR: ABNORMAL
SODIUM SERPL-SCNC: 140 MMOL/L (ref 136–145)
TROPONIN T SERPL-MCNC: <0.01 NG/ML (ref 0–0.03)
WBC # BLD AUTO: 3.6 10*3/MM3 (ref 3.4–10.8)
WHOLE BLOOD HOLD SPECIMEN: NORMAL
WHOLE BLOOD HOLD SPECIMEN: NORMAL

## 2021-10-25 PROCEDURE — 93005 ELECTROCARDIOGRAM TRACING: CPT | Performed by: NURSE PRACTITIONER

## 2021-10-25 PROCEDURE — 96374 THER/PROPH/DIAG INJ IV PUSH: CPT

## 2021-10-25 PROCEDURE — 71045 X-RAY EXAM CHEST 1 VIEW: CPT

## 2021-10-25 PROCEDURE — 71275 CT ANGIOGRAPHY CHEST: CPT

## 2021-10-25 PROCEDURE — 85007 BL SMEAR W/DIFF WBC COUNT: CPT | Performed by: NURSE PRACTITIONER

## 2021-10-25 PROCEDURE — 85025 COMPLETE CBC W/AUTO DIFF WBC: CPT | Performed by: NURSE PRACTITIONER

## 2021-10-25 PROCEDURE — 25010000002 DIPHENHYDRAMINE PER 50 MG: Performed by: NURSE PRACTITIONER

## 2021-10-25 PROCEDURE — 99284 EMERGENCY DEPT VISIT MOD MDM: CPT

## 2021-10-25 PROCEDURE — 93010 ELECTROCARDIOGRAM REPORT: CPT | Performed by: INTERNAL MEDICINE

## 2021-10-25 PROCEDURE — 80053 COMPREHEN METABOLIC PANEL: CPT | Performed by: NURSE PRACTITIONER

## 2021-10-25 PROCEDURE — 84484 ASSAY OF TROPONIN QUANT: CPT | Performed by: NURSE PRACTITIONER

## 2021-10-25 PROCEDURE — 0 IOPAMIDOL PER 1 ML: Performed by: EMERGENCY MEDICINE

## 2021-10-25 RX ORDER — SODIUM CHLORIDE 0.9 % (FLUSH) 0.9 %
10 SYRINGE (ML) INJECTION AS NEEDED
Status: DISCONTINUED | OUTPATIENT
Start: 2021-10-25 | End: 2021-10-25 | Stop reason: HOSPADM

## 2021-10-25 RX ORDER — ACETAMINOPHEN 500 MG
1000 TABLET ORAL ONCE
Status: COMPLETED | OUTPATIENT
Start: 2021-10-25 | End: 2021-10-25

## 2021-10-25 RX ORDER — DIPHENHYDRAMINE HYDROCHLORIDE 50 MG/ML
50 INJECTION INTRAMUSCULAR; INTRAVENOUS ONCE
Status: COMPLETED | OUTPATIENT
Start: 2021-10-25 | End: 2021-10-25

## 2021-10-25 RX ORDER — ONDANSETRON 2 MG/ML
4 INJECTION INTRAMUSCULAR; INTRAVENOUS ONCE
Status: DISCONTINUED | OUTPATIENT
Start: 2021-10-25 | End: 2021-10-25 | Stop reason: HOSPADM

## 2021-10-25 RX ORDER — HYDROMORPHONE HYDROCHLORIDE 1 MG/ML
0.5 INJECTION, SOLUTION INTRAMUSCULAR; INTRAVENOUS; SUBCUTANEOUS ONCE
Status: DISCONTINUED | OUTPATIENT
Start: 2021-10-25 | End: 2021-10-25 | Stop reason: HOSPADM

## 2021-10-25 RX ADMIN — ACETAMINOPHEN 1000 MG: 500 TABLET, FILM COATED ORAL at 14:48

## 2021-10-25 RX ADMIN — DIPHENHYDRAMINE HYDROCHLORIDE 50 MG: 50 INJECTION, SOLUTION INTRAMUSCULAR; INTRAVENOUS at 13:54

## 2021-10-25 RX ADMIN — IOPAMIDOL 100 ML: 755 INJECTION, SOLUTION INTRAVENOUS at 14:44

## 2021-10-25 NOTE — ED PROVIDER NOTES
I supervised care provided by the midlevel provider.   We have discussed this patient's history, physical exam, and treatment plan.  I have reviewed the note and personally saw and examined the patient and agree with the plan of care.   I have seen and examined this patient.  Patient as well as daughter is present.  She complains of pain that started Saturday morning.  Today is Monday.  Pain started after she was rotated to be cleaned and bathed.  She also reported that she had a gait belt that was around her chest the evening before to help move her.  Patient is bed ridden.  She denies any shortness of breath.  Pain is worse with any sort of movement or pressing on the left side of her chest.  Denies any abdominal pain.  She is eating very well no vomiting or diarrhea.  Pain only occurs with movement.  When she is still and does not move her left chest she does not have pain.  GENERAL: Elderly female that is frail and chronically ill-appearing not distressed  HENT: nares patent  Head/neck/ face are symmetric without gross deformity or swelling  EYES: no scleral icterus  CV: regular rhythm, regular rate with intact distal pulses.  Has very reproducible pain in the left side of her chest it is the mid to upper portion.  There is no crepitance or subcutaneous air.  RESPIRATORY: normal effort and no respiratory distress.  Clear to auscultation bilateral  ABDOMEN: soft and nontender  MUSCULOSKELETAL: no deformity  NEURO: alert and appropriate, moves all extremities, follows commands  SKIN: warm, dry    Vital signs and nursing notes reviewed.    Plan I think is very likely musculoskeletal pain.  Likely from trauma from movement in order to clean her.'s been going on for a little over 2 days.  She does not want any pain medicine at this time.  She will except Tylenol only.  Informed patient and daughter of my concerns.  All questions answered.  CT scan of the chest is pending.    We are currently under a pandemic from the  COVID19 infection.  The patient presented to the emergency department by ambulance or personal vehicle. I followed the current protocols required by Infection Control at [facility] in my evaluation and treatment of the patient. The patient was wearing a face mask during my evaluation and throughout my encounter. During my whole encounter with this patient I used appropriate personal protective equipment.  This equipment consisted of eye protection, facemask, gown, and gloves.  I applied this equipment before entering the room.    ED Course as of 10/25/21 2103   Mon Oct 25, 2021   1220 Discussed with patient that we will obtain labs, CT angio of the chest to rule out PE we will also obtain EKG. I do feel that this pain patient is having is musculoskeletal in nature especially since she states that they did use a gait belt to move her recently and that is when the pain started after that. [MS]   1407 I looked at the chest x-ray and reviewed the chest x-ray no acute abnormality appreciated. [MM]   1407 Still waiting on the EKG to be completed. [MM]   1412 Reviewed pt's history and workup with Dr. Archibald.  After a bedside evaluation, they agree with the plan of care.       [MS]   1429 EKG readingEKG was done at 1412It is a rate of 70 it is a ventricularly paced wide-complexSome nonspecific changes.  She has some LVHI do not see any obvious injury pattern.  No significant change from previous EKG from 8/11/2021. [MM]   1637 Called CT regarding no results on the CTA chest as of yet.  They are going to check into it and see if they can resolve the problem. [MS]   1754  Discussed with Dr. Hernandez regarding the CTA chest results which revealed no PE or other acute abnormality.  See dictation for official radiology interpretation.     [MS]   1800 Patient and daughter updated on CTA results that showed no PE or other acute abnormalities seen in the chest or lungs.  Lab work was unremarkable.  Discussed with him  that I feel this is musculoskeletal in nature and that will get better with time.  Patient was given strict return to ER precautions and close follow-up with her PMD.  Patient verbalized understanding and is agreeable to this plan. [MS]   5925 Discussed with Janice Causey CCP RN regarding patient needs ambulance transport back home. [MS]      ED Course User Index  [MM] Merlin Archibald MD  [MS] Jessenia Perdomo, Merlin Nickerson MD  10/25/21 1291

## 2021-10-25 NOTE — ED PROVIDER NOTES
EMERGENCY DEPARTMENT ENCOUNTER    Room Number:  31/31  Date of encounter:  10/25/2021  PCP: Mega Esparza MD  Historian: Patient      PPE    Patient was placed in face mask in first look. Patient was wearing facemask when I entered the room and throughout our encounter. I wore full protective equipment throughout this patient encounter including a CAPR face mask, and gloves. Hand hygiene was performed before donning protective equipment and after removal when leaving the room.        HPI:  Chief Complaint: Left-sided chest pain  A complete HPI/ROS/PMH/PSH/SH/FH are unobtainable due to: Nothing    Context: Brittany Villeda is a 86 y.o. female who arrives to the ED via EMS from home.  Patient presents with c/o moderate, intermittent, sharp left-sided chest pain that began 2 days ago.   Patient also complains of shortness of breath that also began 2 days ago, nonproductive cough last night.  Patient denies fever, chills, nausea, vomiting, diaphoresis, radiation of the pain, abdominal pain or any other symptoms.  Patient states that she is bedbound, does not get up and get around on her own.  She denies any recent injury.  Patient states that remaining still makes the symptoms better and taking a deep breath or turning to her left side worsens symptoms.          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   • Leukopenia 09/12/2016   • MGUS (monoclonal gammopathy of unknown significance) 09/16/2016   • 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Formerly [facility])  11/30/2017   • Other chest pain 12/24/2017   • Lichen sclerosus 08/23/2017   • Heart block 10/30/2018   • Sleep-related hypoxia 12/22/2018   • Bradycardia 12/29/2018   • Shoulder arthritis 01/28/2019   • History of atrial fibrillation 03/19/2019   • Pacemaker 03/19/2019   • Paroxysmal SVT (supraventricular tachycardia) ([facility]) 05/08/2019   • History of chest pain 05/08/2019   • Palpitations 05/08/2019   • Atrial fibrillation ([facility]) 10/06/2019   • Rectal bleeding 10/15/2019   • Arthritis 12/13/2019   • Ascending aortic aneurysm ([facility]) 08/24/2020   • Mediastinal lymphadenopathy 09/09/2020   • Immobility 11/20/2020   • Left knee pain 11/20/2020   • Hemarthrosis of left knee 11/20/2020   • Anemia    • Obesity (BMI 30-39.9)    • Generalized weakness 04/08/2021   • Dysautonomia ([facility]) 04/09/2021   • Weakness of both lower extremities 04/09/2021   • Macrocytosis 04/11/2021   • Hypercalcemia 04/11/2021   • Arthritis of right wrist 04/13/2021   • Hyperparathyroidism ([facility]) 04/28/2021   • Choledocholithiasis 05/09/2021   • Essential hypertension 05/10/2021   • Hyperglycemia 05/10/2021   • Epigastric pain 05/12/2021   • Duodenal ulcer 05/13/2021   • Hemorrhagic gastritis 05/13/2021   • Exertional dyspnea 06/22/2021   • COPD (chronic obstructive pulmonary disease) ([facility])    • Functional quadriplegia ([facility]) 11/20/2020   • Hip pain 04/12/2018   • Osteoarthritis of glenohumeral joint 07/12/2018   • Other malaise and fatigue 05/25/2021   • Shoulder pain 04/12/2018   • ICH (intracerebral hemorrhage) ([facility]) 08/05/2021     Resolved Ambulatory Problems     Diagnosis Date Noted   • Abnormal weight loss 05/12/2016   • Tachycardia 05/12/2016   • Nausea and vomiting 05/12/2016   • Hyperthyroidism 05/12/2016   • Fatigue 05/12/2016   • Dizziness 05/12/2016   • Diarrhea 05/12/2016   • Abnormal thyroid stimulating hormone (TSH) level 05/12/2016   • Abdominal pain 05/12/2016   • Abnormal EKG 06/30/2016   • Chronic anticoagulation 11/20/2020   • Acute  UTI (urinary tract infection) 04/08/2021   • Spondylosis of lumbosacral region without myelopathy or radiculopathy 04/09/2021   • Hypomagnesemia 04/13/2021   • Subdural hematoma (HCC) 08/05/2021     Past Medical History:   Diagnosis Date   • Arrhythmia    • Asthma    • Chest pain    • Colitis    • GERD (gastroesophageal reflux disease)    • Hypertension    • Hypertensive heart disease    • Kidney stone    • Low back pain    • Nephrolithiasis    • Obesity    • Paroxysmal atrial fibrillation (HCC)    • Peptic ulceration    • Persistent atrial fibrillation (HCC)    • PSVT (paroxysmal supraventricular tachycardia) (HCC)    • Rectal bleed    • Sleep apnea    • Systemic hypertension    • Ventricular tachycardia (HCC)    • Vertigo          PAST SURGICAL HISTORY  Past Surgical History:   Procedure Laterality Date   • BACK SURGERY      lumbar fusion   • DUSTY HOLE Left 8/8/2021    Procedure: Left-sided dusty holes for evacuation of subdural hematoma;  Surgeon: Gustavo Callaway MD;  Location: [facility] MAIN OR;  Service: Neurosurgery;  Laterality: Left;   • CARDIAC ELECTROPHYSIOLOGY PROCEDURE N/A 11/7/2018    Procedure: Pacemaker DC new   Digital Lab;  Surgeon: Jesus Granda MD;  Location: [facility] CATH INVASIVE LOCATION;  Service: Cardiology   • CHOLECYSTECTOMY     • COLONOSCOPY  06/16/2014    colitis, cryptitis,  tics, NBIH, TA w/low grade dysplasia   • COLONOSCOPY N/A 10/28/2019    Procedure: COLONOSCOPY WITH COLD AND HOT POLYPECTOMIES;  Surgeon: Micaela English MD;  Location: [facility] ENDOSCOPY;  Service: Gastroenterology   • ENDOSCOPY N/A 5/13/2021    Procedure: ESOPHAGOGASTRODUODENOSCOPY with BX;  Surgeon: Stefan Mcfadden MD;  Location: [facility] ENDOSCOPY;  Service: Gastroenterology;  Laterality: N/A;  EPIGASTRIC PAIN  --DUODENAL ULCER, HEMORRHAGIC GASTRITIS, HIATAL HERNIA    • HEMORRHOIDECTOMY     • HYSTERECTOMY     • JOINT REPLACEMENT     • KNEE ARTHROPLASTY     • MYOMECTOMY     • PACEMAKER IMPLANTATION     •  SHOULDER SURGERY     • SINUS SURGERY     • TOE NAIL AMPUTATION  2019   • TONSILLECTOMY           FAMILY HISTORY  Family History   Problem Relation Age of Onset   • Diabetes Mother    • Breast cancer Sister    • Kidney cancer Sister    • Heart disease Sister    • Prostate cancer Brother    • Prostate cancer Brother    • Prostate cancer Brother    • Malig Hyperthermia Neg Hx          SOCIAL HISTORY  Social History     Socioeconomic History   • Marital status:    • Number of children: 10   • Years of education: High School   Tobacco Use   • Smoking status: Former Smoker     Packs/day: 1.50     Years: 10.00     Pack years: 15.00     Start date:      Quit date:      Years since quittin.8   • Smokeless tobacco: Never Used   • Tobacco comment: QUIT SMOKING    Vaping Use   • Vaping Use: Never used   Substance and Sexual Activity   • Alcohol use: No   • Drug use: Defer   • Sexual activity: Defer         ALLERGIES  Codeine, Amitriptyline, Amoxicillin-pot clavulanate, Aspirin, Bactrim [sulfamethoxazole-trimethoprim], Carisoprodol-aspirin-codeine, Iodinated diagnostic agents, Latex, Naproxen, Nsaids, Soma compound with codeine [carisoprodol-aspirin-codeine], Sulfa antibiotics, and Tramadol        REVIEW OF SYSTEMS  Review of Systems     All systems reviewed and negative except for those discussed in HPI.        PHYSICAL EXAM    ED Triage Vitals [10/25/21 1132]   Temp Heart Rate Resp BP SpO2   97 °F (36.1 °C) 70 16 132/72 100 %       Physical Exam  GENERAL: Well appearing, nontoxic appearing, mildly distressed secondary to pain  HENT: normocephalic, atraumatic  EYES: no scleral icterus, PERRL  CV: regular rhythm, regular rate, no murmur  RESPIRATORY: normal effort, CTAB, reproducible left-sided chest wall tenderness  ABDOMEN: soft, normal bowel sounds, nontender  MUSCULOSKELETAL: no deformity  NEURO: alert, moves all extremities, follows commands, mental status normal/baseline  SKIN: warm, dry, no  rash   Psych: Appropriate mood and affect  Nursing notes and vital signs reviewed      LAB RESULTS  Recent Results (from the past 24 hour(s))   Green Top (Gel)    Collection Time: 10/25/21 11:53 AM   Result Value Ref Range    Extra Tube Hold for add-ons.    Lavender Top    Collection Time: 10/25/21 11:53 AM   Result Value Ref Range    Extra Tube hold for add-on    Light Blue Top    Collection Time: 10/25/21 11:53 AM   Result Value Ref Range    Extra Tube hold for add-on    Comprehensive Metabolic Panel    Collection Time: 10/25/21 11:53 AM    Specimen: Blood   Result Value Ref Range    Glucose 93 65 - 99 mg/dL    BUN 8 8 - 23 mg/dL    Creatinine 0.74 0.57 - 1.00 mg/dL    Sodium 140 136 - 145 mmol/L    Potassium 4.4 3.5 - 5.2 mmol/L    Chloride 106 98 - 107 mmol/L    CO2 26.7 22.0 - 29.0 mmol/L    Calcium 10.5 8.6 - 10.5 mg/dL    Total Protein 7.4 6.0 - 8.5 g/dL    Albumin 3.50 3.50 - 5.20 g/dL    ALT (SGPT) 8 1 - 33 U/L    AST (SGOT) 14 1 - 32 U/L    Alkaline Phosphatase 77 39 - 117 U/L    Total Bilirubin 1.2 0.0 - 1.2 mg/dL    eGFR  African Amer 90 >60 mL/min/1.73    Globulin 3.9 gm/dL    A/G Ratio 0.9 g/dL    BUN/Creatinine Ratio 10.8 7.0 - 25.0    Anion Gap 7.3 5.0 - 15.0 mmol/L   Troponin    Collection Time: 10/25/21 11:53 AM    Specimen: Blood   Result Value Ref Range    Troponin T <0.010 0.000 - 0.030 ng/mL   CBC Auto Differential    Collection Time: 10/25/21 11:53 AM    Specimen: Blood   Result Value Ref Range    WBC 3.60 3.40 - 10.80 10*3/mm3    RBC 4.17 3.77 - 5.28 10*6/mm3    Hemoglobin 13.3 12.0 - 15.9 g/dL    Hematocrit 41.5 34.0 - 46.6 %    MCV 99.5 (H) 79.0 - 97.0 fL    MCH 31.9 26.6 - 33.0 pg    MCHC 32.0 31.5 - 35.7 g/dL    RDW 14.0 12.3 - 15.4 %    RDW-SD 52.6 37.0 - 54.0 fl    MPV 9.7 6.0 - 12.0 fL    Platelets 147 140 - 450 10*3/mm3   Manual Differential    Collection Time: 10/25/21 11:53 AM    Specimen: Blood   Result Value Ref Range    Neutrophil % 41.8 (L) 42.7 - 76.0 %    Lymphocyte % 53.1 (H)  19.6 - 45.3 %    Monocyte % 4.1 (L) 5.0 - 12.0 %    Eosinophil % 1.0 0.3 - 6.2 %    Neutrophils Absolute 1.50 (L) 1.70 - 7.00 10*3/mm3    Lymphocytes Absolute 1.91 0.70 - 3.10 10*3/mm3    Monocytes Absolute 0.15 0.10 - 0.90 10*3/mm3    Eosinophils Absolute 0.04 0.00 - 0.40 10*3/mm3    Anisocytosis Mod/2+ None Seen    Macrocytes Mod/2+ None Seen    Smudge Cells Slight/1+ None Seen    Platelet Morphology Normal Normal   ECG 12 Lead    Collection Time: 10/25/21  2:12 PM   Result Value Ref Range    QT Interval 455 ms       Ordered the above labs and independently reviewed the results.      RADIOLOGY  XR Chest 1 View    Result Date: 10/25/2021  PORTABLE CHEST X-RAY  HISTORY: Left chest wall pain.  Portable chest x-ray is provided. Correlation: Chest x-ray August 12, 2021.  FINDINGS: There is a dual lead cardiac pacing device with cardiomegaly, mild, and unchanged. Pulmonary vascular volume is normal. The right hemidiaphragm is elevated but unchanged. The lungs appear clear. There is arthritic change at the shoulders.      No acute abnormality identified.  This report was finalized on 10/25/2021 12:49 PM by Dr. Jp Jackson M.D.      CT Angiogram Chest    Result Date: 10/25/2021  CT ANGIOGRAM CHEST-  Radiation dose reduction techniques were utilized, including automated exposure control and exposure modulation based on body size.  CLINICAL: Left-sided chest pain 86-year-old female, assess for pulmonary embolus.  TECHNIQUE: CT scan of the chest performed after the administration of contrast material to include coronal, sagittal and three-dimensional reconstructions. Opacification of the pulmonary arterial tree was suboptimal, the patient was reinjected using the right arm.  COMPARISON: Examination 05/09/2021  FINDINGS: No pulmonary embolus. Large goiter. Elevation of the right hemidiaphragm similar to the previous examination with atelectasis right lower lobe. No acute airspace disease or suspicious lesion has  developed. There is cardiac enlargement, no pericardial effusion. The esophagus is satisfactory in course and caliber. Mildly enlarged mediastinal lymph nodes similar to the previous examination. No acute osseous abnormality.  Minimal chronic infiltrate lingular segment.  CONCLUSION: 1. No pulmonary embolus demonstrated. 2. Goiter. 3. Elevation of the right hemidiaphragm with atelectasis right lower lobe similar to 05/09/2021, no acute pulmonary process. 4. Minimal chronic parenchymal change lingular segment again seen.  Findings called to Jessenia Perdomo in the emergency room at the time of completion, 5:50 PM.           I ordered the above noted radiological studies and viewed the images on the PACS system.       EKG      Independently viewed by me and interpreted by Dr Archibald         MEDICAL RECORD REVIEW  Medical records reviewed in [facility], patient had previously been on Coumadin for history of PAF, however it was discontinued recently after patient had subdural bleed.  Her PMD discussed with her that the risk did not help with the benefits.    PROCEDURES    Procedures        DIFFERENTIAL DIAGNOSIS  Differential diagnosis for chest pain include but are not limited to the following:  Anxiety, muscle strain, costochondritis, pleurisy, herpes zoster, MI, ACS, Aortic dissection, PE, pneumonia, pneumothorax, GERD        PROGRESS, DATA ANALYSIS, CONSULTS, AND MEDICAL DECISION MAKING        ED Course as of 10/25/21 1917   Mon Oct 25, 2021   1220 Discussed with patient that we will obtain labs, CT angio of the chest to rule out PE we will also obtain EKG. I do feel that this pain patient is having is musculoskeletal in nature especially since she states that they did use a gait belt to move her recently and that is when the pain started after that. [MS]   1407 I looked at the chest x-ray and reviewed the chest x-ray no acute abnormality appreciated. [MM]   1407 Still waiting on the EKG to be completed. [MM]   1412  Reviewed pt's history and workup with Dr. Archibald.  After a bedside evaluation, they agree with the plan of care.       [MS]   1429 EKG readingEKG was done at 1412It is a rate of 70 it is a ventricularly paced wide-complexSome nonspecific changes.  She has some LVHI do not see any obvious injury pattern.  No significant change from previous EKG from 8/11/2021. [MM]   1637 Called CT regarding no results on the CTA chest as of yet.  They are going to check into it and see if they can resolve the problem. [MS]   1754  Discussed with Dr. Hernandez regarding the CTA chest results which revealed no PE or other acute abnormality.  See dictation for official radiology interpretation.     [MS]   1800 Patient and daughter updated on CTA results that showed no PE or other acute abnormalities seen in the chest or lungs.  Lab work was unremarkable.  Discussed with him that I feel this is musculoskeletal in nature and that will get better with time.  Patient was given strict return to ER precautions and close follow-up with her PMD.  Patient verbalized understanding and is agreeable to this plan. [MS]   1805 Discussed with Janice Causey CCP RN regarding patient needs ambulance transport back home. [MS]      ED Course User Index  [MM] Merlin Archibald MD  [MS] Jessenia Perdomo APRN     Discussed plan for discharge, as there is no emergent indication for admission. Pt/family is agreeable and understands need for follow up and repeat testing.  Pt is aware that discharge does not mean that nothing is wrong but it indicates no emergency is present that requires admission and they must continue care with follow-up as given below or physician of their choice.   Patient/Family voiced understanding of above instructions.  Patient discharged in stable condition.    DIAGNOSIS  Final diagnoses:   Contusion of left chest wall, initial encounter       FOLLOW UP   Mega Esparza MD  [address]  [address]  [facility]  [address]  [phone]    Call in 1 day        RX     Medication List      No changes were made to your prescriptions during this visit.             MEDICATIONS GIVEN IN ED    Medications   sodium chloride 0.9 % flush 10 mL (has no administration in time range)   HYDROmorphone (DILAUDID) injection 0.5 mg (0 mg Intravenous Hold 10/25/21 1336)   ondansetron (ZOFRAN) injection 4 mg (0 mg Intravenous Hold 10/25/21 1337)   diphenhydrAMINE (BENADRYL) injection 50 mg (50 mg Intravenous Given 10/25/21 1354)   acetaminophen (TYLENOL) tablet 1,000 mg (1,000 mg Oral Given 10/25/21 1448)   iopamidol (ISOVUE-370) 76 % injection 100 mL (100 mL Intravenous Given 10/25/21 1444)           COURSE & MEDICAL DECISION MAKING  Any/All labs and Any/All Imaging studies that were ordered were reviewed and are noted above.  Results were reviewed/discussed with the patient and they were also made aware of online access.    Pt also made aware that some labs, such as cultures, will not be resulted during ER visit and follow up with PMD is necessary.        Jessenia Perdomo, APRN  10/25/21 1917

## 2021-10-25 NOTE — ED TRIAGE NOTES
Pain under left arm that radiates to left breast.  Pt thinks it is from her gait belt.  It only hurts when she touches it or she moves it or she takes a deep breath.  Denies soa and cp.  Is on RA at baseline.  Put on o2 by ems for comfort    Patient was placed in face mask during first look triage.  Patient was wearing a face mask throughout encounter.  I wore personal protective equipment throughout the encounter.  Hand hygiene was performed before and after patient encounter.

## 2021-10-25 NOTE — DISCHARGE INSTRUCTIONS
Continue current home medications  Tylenol as needed for pain  Ice to chest wall as needed  Follow-up with your PMD in 3 to 5 days if symptoms not improving  Return to the ER for fever, chills, chest pain, shortness of breath, weakness, dizziness, nausea, vomiting or any new or worsening symptoms

## 2021-10-25 NOTE — ED NOTES
DC papers given to family member at bedside. [facility] aware pt will need EMS tx home. States pt not able to stand or walk since surgery yet.      Brooke Gallardo, RN  10/25/21 6888

## 2021-10-29 RX ORDER — PANTOPRAZOLE SODIUM 40 MG/1
TABLET, DELAYED RELEASE ORAL
Qty: 180 TABLET | Refills: 1 | Status: SHIPPED | OUTPATIENT
Start: 2021-10-29 | End: 2022-04-28

## 2021-10-29 NOTE — TELEPHONE ENCOUNTER
Rx Refill Note  Requested Prescriptions     Pending Prescriptions Disp Refills   • pantoprazole (PROTONIX) 40 MG EC tablet [Pharmacy Med Name: PANTOPRAZOLE SODIUM 40 MG Tablet Delayed Release] 180 tablet      Sig: TAKE 1 TABLET TWICE DAILY      Last office visit with prescribing clinician: 7/29/2021      Next office visit with prescribing clinician: 1/31/2022            Clarissa Veliz MA  10/29/21, 13:21 EDT

## 2021-11-08 ENCOUNTER — TELEMEDICINE (OUTPATIENT)
Dept: FAMILY MEDICINE CLINIC | Facility: CLINIC | Age: 86
End: 2021-11-08

## 2021-11-08 ENCOUNTER — TELEPHONE (OUTPATIENT)
Dept: FAMILY MEDICINE CLINIC | Facility: CLINIC | Age: 86
End: 2021-11-08

## 2021-11-08 DIAGNOSIS — R05.9 COUGH: Primary | ICD-10-CM

## 2021-11-08 PROCEDURE — 99213 OFFICE O/P EST LOW 20 MIN: CPT | Performed by: FAMILY MEDICINE

## 2021-11-08 RX ORDER — ALBUTEROL SULFATE 90 UG/1
2 AEROSOL, METERED RESPIRATORY (INHALATION) EVERY 4 HOURS PRN
Qty: 18 G | Refills: 0 | Status: SHIPPED | OUTPATIENT
Start: 2021-11-08

## 2021-11-08 RX ORDER — FEXOFENADINE HCL 180 MG/1
180 TABLET ORAL DAILY PRN
Qty: 30 TABLET | Refills: 0 | Status: SHIPPED | OUTPATIENT
Start: 2021-11-08 | End: 2021-11-10 | Stop reason: ALTCHOICE

## 2021-11-08 RX ORDER — GUAIFENESIN 600 MG/1
1200 TABLET, EXTENDED RELEASE ORAL 2 TIMES DAILY PRN
Qty: 30 TABLET | Refills: 0 | Status: SHIPPED | OUTPATIENT
Start: 2021-11-08 | End: 2021-11-10 | Stop reason: ALTCHOICE

## 2021-11-08 NOTE — PROGRESS NOTES
Chief Complaint  Cough    Subjective          Brittany DELROY Villeda presents to [facility]  History of Present Illness    You have chosen to receive care through a telehealth visit.  Do you consent to use a video/audio connection for your medical care today? Yes    Cough.  The last 2 nights.  She feels fine she states.  No shortness of breath.  No fever.  She is having lot of sinus postnasal drip.  Some allergy symptoms.  No wheezing.. She has had this before.  Albuterol helped in the past.    Objective   Vital Signs:   There were no vitals taken for this visit.    Physical Exam  Constitutional:       Comments: Patient is well-known to me.  She appears no acute distress.No tachypnea.       /73 P 72 O2sat 96%  Result Review :                 Assessment and Plan    Diagnoses and all orders for this visit:    1. Cough (Primary)    Other orders  -     fexofenadine (Allegra Allergy) 180 MG tablet; Take 1 tablet by mouth Daily As Needed (allergy sinus congestion).  Dispense: 30 tablet; Refill: 0  -     guaiFENesin (Mucinex) 600 MG 12 hr tablet; Take 2 tablets by mouth 2 (Two) Times a Day As Needed for Cough.  Dispense: 30 tablet; Refill: 0  -     albuterol sulfate  (90 Base) MCG/ACT inhaler; Inhale 2 puffs Every 4 (Four) Hours As Needed for Wheezing (or cough).  Dispense: 18 g; Refill: 0         Cough. Allergies a strong possibility. Less likely early pneumonia.  Prescribing Mucinex, Allegra, and albuterol.  If not improving in a couple days I want to know about it.  With severe symptoms she will seek medical attention immediately.    Duration of visit 15 minutes.      Follow Up   No follow-ups on file.  Patient was given instructions and counseling regarding her condition or for health maintenance advice. Please see specific information pulled into the AVS if appropriate.

## 2021-11-08 NOTE — TELEPHONE ENCOUNTER
Caller: Amina Diana    Relationship: Emergency Contact    Best call back number: [phone] (H)    What medication are you requesting: SOMETHING FOR BAD COUGH    What are your current symptoms: BAD COUGH, SHORTNESS OF BREATH    How long have you been experiencing symptoms: SINCE YESTERDAY MORNING 11/07/2021    Have you had these symptoms before:    [] Yes  [] No    Have you been treated for these symptoms before:   [] Yes  [] No    If a prescription is needed, what is your preferred pharmacy and phone number:  [facility] [address] [facility] & ([place]) - [phone] [facility] [phone] FX     Additional notes: PATIENT'S DAUGHTER STATED PATIENT HAS HAD A RAJIV BLEED AND THIS CAN CAUSE PROBLEMS POSSIBLY, PLEASE ADVISE IF SOMETHING CAN BE CALLED INTO PHARMACY OR IF PATIENT CAN BE SEEN VIA A VIDEO APPOINTMENT ASAP

## 2021-11-09 ENCOUNTER — PATIENT OUTREACH (OUTPATIENT)
Dept: CASE MANAGEMENT | Facility: OTHER | Age: 86
End: 2021-11-09

## 2021-11-09 ENCOUNTER — APPOINTMENT (OUTPATIENT)
Dept: MRI IMAGING | Facility: HOSPITAL | Age: 86
End: 2021-11-09

## 2021-11-09 NOTE — OUTREACH NOTE
Ambulatory Case Management Note    Care Coordination    Attempted outreach to patient for High Risk Case Management regarding recent ED visit. No successful contact x 4. Voicemail left with ACM contact info. No further outreach scheduled at this time.       There are no recently modified care plans to display for this patient.      Ruth Encinas RN  Ambulatory Case Management    11/9/2021, 15:46 EST

## 2021-11-10 ENCOUNTER — TELEPHONE (OUTPATIENT)
Dept: PHARMACY | Facility: HOSPITAL | Age: 86
End: 2021-11-10

## 2021-11-10 ENCOUNTER — TELEMEDICINE (OUTPATIENT)
Dept: CARDIOLOGY | Facility: CLINIC | Age: 86
End: 2021-11-10

## 2021-11-10 ENCOUNTER — TELEPHONE (OUTPATIENT)
Dept: GASTROENTEROLOGY | Facility: CLINIC | Age: 86
End: 2021-11-10

## 2021-11-10 VITALS
HEART RATE: 72 BPM | BODY MASS INDEX: 217.05 KG/M2 | OXYGEN SATURATION: 98 % | SYSTOLIC BLOOD PRESSURE: 107 MMHG | RESPIRATION RATE: 18 BRPM | HEIGHT: 55 IN | DIASTOLIC BLOOD PRESSURE: 62 MMHG

## 2021-11-10 DIAGNOSIS — I77.810 THORACIC AORTIC ECTASIA (HCC): ICD-10-CM

## 2021-11-10 DIAGNOSIS — I48.11 LONGSTANDING PERSISTENT ATRIAL FIBRILLATION (HCC): Primary | ICD-10-CM

## 2021-11-10 DIAGNOSIS — Z95.0 PRESENCE OF CARDIAC PACEMAKER: ICD-10-CM

## 2021-11-10 PROCEDURE — 99213 OFFICE O/P EST LOW 20 MIN: CPT | Performed by: NURSE PRACTITIONER

## 2021-11-10 RX ORDER — FEXOFENADINE HCL 180 MG/1
180 TABLET ORAL AS NEEDED
Status: ON HOLD | COMMUNITY
End: 2022-11-02

## 2021-11-10 RX ORDER — MESALAMINE 0.38 G/1
1.5 CAPSULE, EXTENDED RELEASE ORAL DAILY
Qty: 360 CAPSULE | Refills: 0 | Status: SHIPPED | OUTPATIENT
Start: 2021-11-10 | End: 2021-11-22 | Stop reason: SDUPTHER

## 2021-11-10 NOTE — PROGRESS NOTES
Date of Office Visit: 11/10/2021  Encounter Provider: Tereza Jackson, NIKITA, APRN  Place of Service: [facility] CARDIOLOGY  Patient Name: Brittany Villeda  :1935        Subjective:     Chief Complaint:  Persistent atrial fibrillation, hypertension, sick sinus syndrome status post pacemaker placement      History of Present Illness:  Brittany Villeda is a 86 y.o. female patient of Dr. Culp.  I am doing a telehealth visit with this patient today and I have reviewed her records.    Patient has a history of hypertension, obstructive sleep apnea, obesity, immobility, pulmonary hypertension, nonsustained ventricular cardia, obstructive sleep, diastolic dysfunction.    PER PREVIOUS OFFICE NOTE: In , she had nonsustained ventricular tachycardia.  A stress perfusion study was negative for ischemia.  An echocardiogram revealed normal left ventricular size and function with moderate left ventricular hypertrophy. In 2013, she was seen for persistent dizziness in the setting of new-onset paroxysmal atrial fibrillation.  She was admitted to the hospital and not felt to have had a stroke.  Her symptoms actually resolved with ear manipulation and were felt to be more vestibular in nature.  She also developed paroxysmal atrial fibrillation with early bradycardia which resolved with treatment of her sleep apnea and AV flaco blocker therapy.  She was placed on warfarin.  She also had a heme-positive stool with anemia and was seen by the gastrointestinal doctors and EGD and colonoscopy were performed. The EGD revealed mild erythema but otherwise stable.  She initiated therapy with a BiPAP.  In 2017 presented to the emergency room with chest pain.  She ruled out for myocardial infarction.  A stress perfusion study that was negative for ischemia with normal systolic function.  A Ziopatch revealed sinus rhythm with episodes of supra-ventricular tachycardia that was symptomatic.  In  September 2018 EKG showed pauses in the setting of bifascicular block.  A 24-hour Holter revealed 65 pauses 3 seconds longest induration.  Sinus rhythm was present throughout most of the study.  With complaints of palpitations PACs were noted.  Current 22.2% of the monitor time there is also 14 episodes of atrial tachycardia lasting 4 beats.  There were also episodes of 2-1 AV block and questionable third degree.  An echocardiogram revealed normal systolic function grade 1 diastolic dysfunction, borderline right ventricular enlargement, mild to moderate tricuspid valve regurgitation with an RV systolic pressure 48 mmHg.  The ascending aorta was mildly dilated at 3.8 cm.  I recommended a pacemaker placement but she deferred in which she discuss this further with her daughter and is here today regarding this.  By September 2018 she was noted to have more symptomatic bradycardia and after much discussion she underwent pacemaker placement in November 2018.  Echocardiogram in 4/21 showed an ejection fraction of 60% with right ventricular dysfunction right atrial enlargement with severe left atrial enlargement.  There is mild tricuspid valve regurgitation with normal right-sided pressures.  Saline injection was negative.CT angiogram of the chest and abdomen was performed on 5/21 that showed no pulmonary emboli stable dilated ascending aorta (4.1 cm) with elevated right hemidiaphragm with atelectasis.  THE FOLLOWING IS MY CONTRIBUTION TO NOTE:     She presented to the hospital 8/2021 with slurred speech and headache.  CT showed intracerebral hemorrhage.  Neurosurgery was consulted.  Keppra was ordered.  Blood thinner and Lasix were held at discharge.      Patient has called into the office today for a telehealth follow-up appointment. Daughter with patient on video visit, per patient preference.  Patient reports she is doing well since hospital discharge.  No chest pain, shortness of breath, palpitations, swelling,  dizziness, fainting, falling, or abnormal bleeding.  107/62, 72, 98%;  118/76, 72;  129/73, 72.  She has PT coming to the house.  She is doing some standing and walks a few steps.  Also doing leg exercises in bed.  Feels like she is getting stronger.  Still does not think she could make it into the office at this point.        Past Medical History:   Diagnosis Date   • Acute UTI (urinary tract infection) 4/8/2021   • Anemia    • Arrhythmia    • Arthritis    • Asthma    • Bradycardia    • Chest pain    • Choledocholithiasis 5/9/2021   • Colitis    • COPD (chronic obstructive pulmonary disease) ([facility])    • Diastolic dysfunction    • Essential hypertension 5/12/2016   • GERD (gastroesophageal reflux disease)    • Heart block    • Hypertension    • Hypertensive heart disease    • Hyperthyroidism    • Kidney stone    • Leukopenia    • Low back pain    • MGUS (monoclonal gammopathy of unknown significance)    • Nephrolithiasis    • Obesity    • Paroxysmal atrial fibrillation (HCC)    • Peptic ulceration    • Persistent atrial fibrillation ([facility])    • PSVT (paroxysmal supraventricular tachycardia) ([facility])    • Pulmonary hypertension ([facility])    • Rectal bleed    • Sleep apnea    • Systemic hypertension    • Trifascicular block    • 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[facility])    • Ventricular tachycardia ([facility])     nonsustained   • Vertigo      Past Surgical History:   Procedure Laterality Date   • BACK SURGERY      lumbar fusion   • PAWAN HOLE Left 8/8/2021    Procedure: Left-sided pawan holes for evacuation of subdural hematoma;  Surgeon: Gustavo Callaway MD;  Location: [facility] OR;  Service: Neurosurgery;  Laterality: Left;   • CARDIAC ELECTROPHYSIOLOGY PROCEDURE N/A 11/7/2018    Procedure: Pacemaker DC new   [place];  Surgeon: Jesus Granda MD;  Location: [facility] CATH INVASIVE LOCATION;  Service: Cardiology   • CHOLECYSTECTOMY     • COLONOSCOPY  06/16/2014    colitis, cryptitis,  tics, NBIH, TA w/low grade  dysplasia   • COLONOSCOPY N/A 10/28/2019    Procedure: COLONOSCOPY WITH COLD AND HOT POLYPECTOMIES;  Surgeon: Micaela English MD;  Location: [facility] ENDOSCOPY;  Service: Gastroenterology   • ENDOSCOPY N/A 5/13/2021    Procedure: ESOPHAGOGASTRODUODENOSCOPY with BX;  Surgeon: Stefan Mcfadden MD;  Location: [facility] ENDOSCOPY;  Service: Gastroenterology;  Laterality: N/A;  EPIGASTRIC PAIN  --DUODENAL ULCER, HEMORRHAGIC GASTRITIS, HIATAL HERNIA    • HEMORRHOIDECTOMY     • HYSTERECTOMY     • JOINT REPLACEMENT     • KNEE ARTHROPLASTY     • MYOMECTOMY     • PACEMAKER IMPLANTATION     • SHOULDER SURGERY     • SINUS SURGERY     • TOE NAIL AMPUTATION  03/04/2019   • TONSILLECTOMY       Outpatient Medications Prior to Visit   Medication Sig Dispense Refill   • acetaminophen (TYLENOL) 500 MG tablet Take 1 tablet by mouth Every 6 (Six) Hours As Needed for Mild Pain .     • albuterol sulfate  (90 Base) MCG/ACT inhaler Inhale 2 puffs Every 4 (Four) Hours As Needed for Wheezing (or cough). 18 g 0   • docusate sodium (COLACE) 100 MG capsule Take 100 mg by mouth Every Night.     • fexofenadine (ALLEGRA) 180 MG tablet Take 180 mg by mouth Daily.     • magnesium oxide (MAG-OX) 400 MG tablet Take 1 tablet by mouth Daily. 90 tablet 0   • mesalamine (APRISO) 0.375 g 24 hr capsule TAKE 4 CAPSULES DAILY 360 capsule 0   • pantoprazole (PROTONIX) 40 MG EC tablet TAKE 1 TABLET TWICE DAILY 180 tablet 1   • vitamin B-12 (CYANOCOBALAMIN) 1000 MCG tablet Take 1,000 mcg by mouth Daily.     • fexofenadine (Allegra Allergy) 180 MG tablet Take 1 tablet by mouth Daily As Needed (allergy sinus congestion). 30 tablet 0   • clotrimazole (LOTRIMIN) 1 % cream Apply  topically to the appropriate area as directed 2 (Two) Times a Day. 60 g 0   • guaiFENesin (Mucinex) 600 MG 12 hr tablet Take 2 tablets by mouth 2 (Two) Times a Day As Needed for Cough. 30 tablet 0     No facility-administered medications prior to visit.       Allergies as of  11/10/2021 - Reviewed 11/10/2021   Allergen Reaction Noted   • Codeine Hallucinations 2017   • Amitriptyline Rash 2016   • Amoxicillin-pot clavulanate Rash 2016   • Aspirin Unknown - Low Severity 2016   • Bactrim [sulfamethoxazole-trimethoprim] Rash 2016   • Carisoprodol-aspirin-codeine Palpitations 2016   • Iodinated diagnostic agents Rash 2016   • Latex Rash 2016   • Naproxen Rash 2016   • Nsaids Unknown - Low Severity 2016   • Soma compound with codeine [carisoprodol-aspirin-codeine] Rash 2016   • Sulfa antibiotics Rash 2016   • Tramadol Palpitations 2019     Social History     Socioeconomic History   • Marital status:    • Number of children: 10   • Years of education: High School   Tobacco Use   • Smoking status: Former Smoker     Packs/day: 1.50     Years: 10.00     Pack years: 15.00     Start date:      Quit date:      Years since quittin.8   • Smokeless tobacco: Never Used   • Tobacco comment: QUIT SMOKING    Vaping Use   • Vaping Use: Never used   Substance and Sexual Activity   • Alcohol use: No   • Drug use: Defer   • Sexual activity: Defer     Family History   Problem Relation Age of Onset   • Diabetes Mother    • Breast cancer Sister    • Kidney cancer Sister    • Heart disease Sister    • Prostate cancer Brother    • Prostate cancer Brother    • Prostate cancer Brother    • Malig Hyperthermia Neg Hx        Review of Systems   Constitutional: Negative for malaise/fatigue.   Cardiovascular:        SEE HPI   Respiratory: Negative for shortness of breath.    Hematologic/Lymphatic: Negative for bleeding problem.   Gastrointestinal: Negative for melena.   Genitourinary: Negative for hematuria.   Neurological: Negative for dizziness.   Psychiatric/Behavioral: Negative for altered mental status.          Objective:     Vitals:    11/10/21 1408   BP: 107/62   Pulse: 72   Resp: 18   SpO2: 98%   Height: 64 cm  "(25.2\")     Body mass index is 217.05 kg/m².      PHYSICAL EXAM:  Vitals reviewed.   Constitutional:       Appearance: Healthy appearance. Not in distress.   HENT:      Head:      Comments: Voice normal  Pulmonary:      Effort: Pulmonary effort is normal.   Neurological:      Mental Status: Alert and oriented to person, place and time.               Assessment:       Diagnosis Plan   1. Longstanding persistent atrial fibrillation (HCC)     2. Presence of cardiac pacemaker     3. Thoracic aortic ectasia (HCC)           Plan:     1. Persistent atrial fibrillation: With paroxysmal SVT.  Rates appear controlled.  She has been off of her warfarin since August.  She denies any falls or head injuries prior to brain bleed.  INR was therapeutic.  At this point we discussed possibly resuming her warfarin.  She is aware of potential stroke risk/death if she does not resume warfarin however she is very concerned about recurrent bleeding if she does go back on a blood thinner as she has now had two bleeding issues with AC.  At this point she is wanting to take the risks of no anticoagulation over chancing another bleed.  Again patient verbalized understanding of risks.  2. Sick sinus syndrome status post permanent pacemaker placement: Continue with routine device checks.  We will see if we can go ahead and recheck pacemaker as it looks like 3 months.  3. Diastolic dysfunction: She denies any swelling or shortness of breath symptoms  4. Dilated ascending aorta: Seen on 8/2020 CT chest.  We will see if radiologist can go back and comment on size of thoracic aorta on 10/2021 CTA chest, call out to radiology.  5. Pulmonary hypertension: Clinically stable  6. Obstructive sleep apnea: Untreated  7. History of rectal bleeding with hemorrhoids  8. Mediastinal adenopathy: Follows with PCP  9. Debilitated state    Plan of care reviewed with Dr. Robbi MD.    Patient to schedule 8 week office follow-up with Dr. Culp or follow-up sooner as " needed for any new, recurrent, or worsening symptoms or other issues or concerns. Discussed in detail signs/symptoms that warrant sooner call or follow-up to the office or ER visit.        This patient has consented to a telehealth visit via video. The visit was scheduled as a telehealth video visit to comply with patient safety concerns in accordance with CDC recommendations.  All vitals recorded within this visit are reported by the patient.  I spent 30 minutes in total including but not limited to the 12 minutes spent in direct conversation with this patient.              Your medication list          Accurate as of November 10, 2021  2:56 PM. If you have any questions, ask your nurse or doctor.            CONTINUE taking these medications      Instructions Last Dose Given Next Dose Due   acetaminophen 500 MG tablet  Commonly known as: TYLENOL      Take 1 tablet by mouth Every 6 (Six) Hours As Needed for Mild Pain .       albuterol sulfate  (90 Base) MCG/ACT inhaler  Commonly known as: PROVENTIL HFA;VENTOLIN HFA;PROAIR HFA      Inhale 2 puffs Every 4 (Four) Hours As Needed for Wheezing (or cough).       docusate sodium 100 MG capsule  Commonly known as: COLACE      Take 100 mg by mouth Every Night.       fexofenadine 180 MG tablet  Commonly known as: ALLEGRA      Take 180 mg by mouth Daily.       magnesium oxide 400 MG tablet  Commonly known as: MAG-OX      Take 1 tablet by mouth Daily.       mesalamine 0.375 g 24 hr capsule  Commonly known as: APRISO      TAKE 4 CAPSULES DAILY       pantoprazole 40 MG EC tablet  Commonly known as: PROTONIX      TAKE 1 TABLET TWICE DAILY       vitamin B-12 1000 MCG tablet  Commonly known as: CYANOCOBALAMIN      Take 1,000 mcg by mouth Daily.              The above medication changes may not have been made by this provider.  Patient's medication list was updated to reflect medications they are currently taking including medication changes made by other  providers.            Thanks,    Tereza Jackson, DNP, APRN  11/10/2021         Dictated utilizing Dragon dictation

## 2021-11-10 NOTE — TELEPHONE ENCOUNTER
----- Message from Matty Gomez sent at 11/10/2021  1:20 PM EST -----  Regarding: Refills needed  Contact: [phone]  Refills needed.  She only has 3 pills left  [facility] to [facility].   [place]..[phone]

## 2021-11-10 NOTE — TELEPHONE ENCOUNTER
Pt last seen 4/21/20.      Call to daughter, Amina.  Advise appt needed for refills.     Reports pt has polymyalgia rheumatica, and has brain bleed this past summer.  Chair fast and very difficulty to get out of house.  Working on rehab and doing well with this.      Asking if may have phone visit.  Advise will send message to Dr English to check if phone visit with Dr English, or if ok for phone visit with aprn/pa.  Verb understanding.     States doing well on mesalamine 0.375 mg - 4 caps by mouth daily.  Needs refill.     [facility] verified.

## 2021-11-11 NOTE — TELEPHONE ENCOUNTER
Can you find a Monday 1045 or 1145 opening? It can be a telephone visit anytime throughout the day. Let me know if you cant find anything. thanks per Vicki VALDEZ.     Called pt's daughter and pt can make phone appt on 11/22 at 1045 with Vicki VALDEZ.      Message sent to Priscila to book appt.

## 2021-11-11 NOTE — TELEPHONE ENCOUNTER
Called pt's daughter and advised of Dr English's note and advised that Dr English had refilled her mother's medication to her [facility].  She verb understanding.       Upon trying to make phone visit no openings available on a Monday.  Message sent to Vicki to see if we can overbook.   ADvised daughter of this.

## 2021-11-17 ENCOUNTER — ANTICOAGULATION VISIT (OUTPATIENT)
Dept: PHARMACY | Facility: HOSPITAL | Age: 86
End: 2021-11-17

## 2021-11-17 NOTE — PROGRESS NOTES
"This visit is for documentation purposes only: Patient is no longer on warfarin. Please refer to 11/10/21 Cardiology note:    \"At this point we discussed possibly resuming her warfarin.  She is aware of potential stroke risk/death if she does not resume warfarin however she is very concerned about recurrent bleeding if she does go back on a blood thinner as she has now had two bleeding issues with AC.  At this point she is wanting to take the risks of no anticoagulation over chancing another bleed.  Again patient verbalized understanding of risks.\"    No longer following in the [facility]. It has been a pleasure being part of her care. If warfarin is resumed in the future, we will be happy to again participate in her care.  "

## 2021-11-22 ENCOUNTER — OFFICE VISIT (OUTPATIENT)
Dept: GASTROENTEROLOGY | Facility: CLINIC | Age: 86
End: 2021-11-22

## 2021-11-22 VITALS
SYSTOLIC BLOOD PRESSURE: 118 MMHG | HEIGHT: 66 IN | DIASTOLIC BLOOD PRESSURE: 60 MMHG | WEIGHT: 196 LBS | BODY MASS INDEX: 31.5 KG/M2

## 2021-11-22 DIAGNOSIS — K26.9 DUODENAL ULCER: ICD-10-CM

## 2021-11-22 DIAGNOSIS — K29.01 OTHER ACUTE GASTRITIS WITH HEMORRHAGE: ICD-10-CM

## 2021-11-22 DIAGNOSIS — K51.30 ULCERATIVE RECTOSIGMOIDITIS WITHOUT COMPLICATION (HCC): Primary | Chronic | ICD-10-CM

## 2021-11-22 DIAGNOSIS — K21.9 GASTROESOPHAGEAL REFLUX DISEASE WITHOUT ESOPHAGITIS: Chronic | ICD-10-CM

## 2021-11-22 PROCEDURE — 99214 OFFICE O/P EST MOD 30 MIN: CPT | Performed by: NURSE PRACTITIONER

## 2021-11-22 RX ORDER — MESALAMINE 0.38 G/1
1.5 CAPSULE, EXTENDED RELEASE ORAL DAILY
Qty: 360 CAPSULE | Refills: 3 | Status: SHIPPED | OUTPATIENT
Start: 2021-11-22 | End: 2022-11-08

## 2021-11-22 NOTE — PROGRESS NOTES
Chief Complaint   Patient presents with   • Ulcerative Colitis       Brittany Villeda is a  86 y.o. female here for a telephone follow up visit for ulcerative colitis.    HPI  86-year-old female presents today for telephone follow-up visit for ulcerative colitis.  She is a patient of Dr. English.  She is new to me today.You have chosen to receive care through a telephone visit. Do you consent to use a telephone visit for your medical care today? YES.    Both the patient and her daughter on the telephone today for this visit.  Overall the patient says she is doing really well.  She is currently taking Apriso 4 tabs daily and her bowels are moving good.  Her daughter says her appetite is also very good.  The patient reports she is still taking pantoprazole 40 mg twice daily and denies any reflux symptoms at this time.  She denies any dysphagia, reflux, abdominal pain, nausea and vomiting, diarrhea, constipation, rectal bleeding or melena.  She much appetite is good and her weight is stable.  Her last EGD was done during her hospitalization at [facility] on 5/21.  At that time our group was consulted for abnormal CT scan.  She had a CT scan of the abdomen pelvis done that showed:    IMPRESSION:        1. No pulmonary embolism. Stably dilated ascending aorta. Elevation of  the right hemidiaphragm with right lower lobe atelectasis. Persistent  groundglass density in the lingula.  2. Mild biliary ductal dilatation with possible choledocholithiasis,  MRCP correlation can be obtained as indicated.  3. No obstructive uropathy.  4. No acute inflammatory process of bowel is identified, follow up as  indications persist.         The EGD showed a duodenal ulcer and bleeding gastritis with a hiatal hernia.  She did finish the Carafate since coming home from the hospital.  Continues to take the pantoprazole 40 mg twice daily.  Her last colonoscopy was on 10/2019.  The patient tells me she is up-to-date with all of her vaccinations  and health screenings.  She also has history of cholecystectomy.      Past Medical History:   Diagnosis Date   • Acute UTI (urinary tract infection) 4/8/2021   • Anemia    • Arrhythmia    • Arthritis    • Asthma    • Bradycardia    • Chest pain    • Choledocholithiasis 5/9/2021   • Colitis    • COPD (chronic obstructive pulmonary disease) (HCC)    • Diastolic dysfunction    • Essential hypertension 5/12/2016   • GERD (gastroesophageal reflux disease)    • Heart block    • Hypertension    • Hypertensive heart disease    • Hyperthyroidism    • Kidney stone    • Leukopenia    • Low back pain    • MGUS (monoclonal gammopathy of unknown significance)    • Nephrolithiasis    • Obesity    • Paroxysmal atrial fibrillation (HCC)    • Peptic ulceration    • Persistent atrial fibrillation (HCC)    • PSVT (paroxysmal supraventricular tachycardia) (HCC)    • Pulmonary hypertension (HCC)    • Rectal bleed    • Sleep apnea    • Systemic hypertension    • Trifascicular block    • 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HCC)    • Ventricular tachycardia (HCC)     nonsustained   • Vertigo        Past Surgical History:   Procedure Laterality Date   • BACK SURGERY      lumbar fusion   • DUSTY HOLE Left 8/8/2021    Procedure: Left-sided dusty holes for evacuation of subdural hematoma;  Surgeon: Gustavo Callaway MD;  Location: [facility] MAIN OR;  Service: Neurosurgery;  Laterality: Left;   • CARDIAC ELECTROPHYSIOLOGY PROCEDURE N/A 11/7/2018    Procedure: Pacemaker DC new   [place];  Surgeon: Jesus Granda MD;  Location: [facility] CATH INVASIVE LOCATION;  Service: Cardiology   • CHOLECYSTECTOMY     • COLONOSCOPY  06/16/2014    colitis, cryptitis,  tics, NBIH, TA w/low grade dysplasia   • COLONOSCOPY N/A 10/28/2019    Procedure: COLONOSCOPY WITH COLD AND HOT POLYPECTOMIES;  Surgeon: Micaela English MD;  Location: [facility] ENDOSCOPY;  Service: Gastroenterology   • ENDOSCOPY N/A 5/13/2021    Procedure: ESOPHAGOGASTRODUODENOSCOPY  with BX;  Surgeon: Stefan Mcfadden MD;  Location: [facility] ENDOSCOPY;  Service: Gastroenterology;  Laterality: N/A;  EPIGASTRIC PAIN  --DUODENAL ULCER, HEMORRHAGIC GASTRITIS, HIATAL HERNIA    • HEMORRHOIDECTOMY     • HYSTERECTOMY     • JOINT REPLACEMENT     • KNEE ARTHROPLASTY     • MYOMECTOMY     • PACEMAKER IMPLANTATION     • SHOULDER SURGERY     • SINUS SURGERY     • TOE NAIL AMPUTATION  2019   • TONSILLECTOMY         Scheduled Meds:    Continuous Infusions:No current facility-administered medications for this visit.      PRN Meds:.    Allergies   Allergen Reactions   • Codeine Hallucinations     Tolerates hydromorphone   • Amitriptyline Rash   • Amoxicillin-Pot Clavulanate Rash   • Aspirin Unknown - Low Severity     Patient doesn't know why   • Bactrim [Sulfamethoxazole-Trimethoprim] Rash   • Carisoprodol-Aspirin-Codeine Palpitations   • Iodinated Diagnostic Agents Rash   • Latex Rash   • Naproxen Rash   • Nsaids Unknown - Low Severity     unknown   • Soma Compound With Codeine [Carisoprodol-Aspirin-Codeine] Rash   • Sulfa Antibiotics Rash   • Tramadol Palpitations     heart races        Social History     Socioeconomic History   • Marital status:    • Number of children: 10   • Years of education: High School   Tobacco Use   • Smoking status: Former Smoker     Packs/day: 1.50     Years: 10.00     Pack years: 15.00     Start date:      Quit date:      Years since quittin.9   • Smokeless tobacco: Never Used   • Tobacco comment: QUIT SMOKING    Vaping Use   • Vaping Use: Never used   Substance and Sexual Activity   • Alcohol use: No   • Drug use: Defer   • Sexual activity: Defer       Family History   Problem Relation Age of Onset   • Diabetes Mother    • Breast cancer Sister    • Kidney cancer Sister    • Heart disease Sister    • Prostate cancer Brother    • Prostate cancer Brother    • Prostate cancer Brother    • Malig Hyperthermia Neg Hx        Review of Systems    Constitutional: Negative for appetite change, chills, diaphoresis, fatigue, fever and unexpected weight change.   HENT: Negative for nosebleeds, postnasal drip, sore throat, trouble swallowing and voice change.    Respiratory: Negative for cough, choking, chest tightness, shortness of breath, wheezing and stridor.    Cardiovascular: Negative for chest pain, palpitations and leg swelling.   Gastrointestinal: Negative for abdominal distention, abdominal pain, anal bleeding, blood in stool, constipation, diarrhea, nausea, rectal pain and vomiting.   Endocrine: Negative for polydipsia, polyphagia and polyuria.   Musculoskeletal: Negative for gait problem.   Skin: Negative for rash and wound.   Allergic/Immunologic: Negative for food allergies.   Neurological: Negative for dizziness, speech difficulty and light-headedness.   Psychiatric/Behavioral: Negative for confusion, self-injury, sleep disturbance and suicidal ideas.       Vitals:    11/22/21 1050   BP: 118/60       Physical Exam  Constitutional:       General: She is not in acute distress.  Neurological:      Mental Status: She is oriented to person, place, and time.   Psychiatric:         Mood and Affect: Mood normal.         Thought Content: Thought content normal.         Judgment: Judgment normal.         No radiology results for the last 7 days     Diagnoses and all orders for this visit:    1. 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HCC) (Primary)    2. Gastroesophageal reflux disease without esophagitis    3. Duodenal ulcer    4. Other acute gastritis with hemorrhage    Other orders  -     mesalamine (APRISO) 0.375 g 24 hr capsule; Take 4 capsules by mouth Daily.  Dispense: 360 capsule; Refill: 3    Today's visit was done over the telephone.  Total time on the phone with the patient and her daughter was 25 minutes.  Overall patient seems to be doing quite well.  Ulcerative colitis seems well controlled on Apriso 4 tabs daily.  I will refill for 1  year.  Bowels are moving well.  Good appetite.  GERD seems well controlled on Protonix 40 mg twice daily.  Continue GERD precautions.  Patient is up-to-date with all of her vaccinations and health screenings.  Patient to call the office with any issues.  Patient to follow-up with Dr. English in 1 year.  Patient is agreeable to the plan.

## 2021-12-06 ENCOUNTER — TELEPHONE (OUTPATIENT)
Dept: NEUROSURGERY | Facility: CLINIC | Age: 86
End: 2021-12-06

## 2021-12-06 NOTE — TELEPHONE ENCOUNTER
Caller: HARRIS ( VERBAL IN CHART)    Relationship to patient: PTS DAUGHTER    Best call back number: [phone]    Chief complaint:     Type of visit: F/U EXT VIDEO VISIT    Requested date: 12/13/21    If rescheduling, when is the original appointment: PTS DAUGHTER CALLED AND IS AWARE DR. WILSON PREFERS NOT TO HAVE MRI COMPLETED ON SAME DAY AS APPT.  DUE TO TRANSPORTATION ISSUES PTS DAUGHTER WAS WONDERING IF SHE COULD BE SCHEDULED A VIDEO VISIT WITH DR. WILSON ON 12/13/21 INSTEAD OF COMING INTO THE OFFICE.  PTS DAUGHTER STATES IF A VIDEO VISIT IS UNABLE TO BE HAD THEY WILL NEED TO CANCEL APPT.    Additional notes:    PLEASE CALL PTS DAUGHTER  THANK YOU

## 2021-12-07 ENCOUNTER — APPOINTMENT (OUTPATIENT)
Dept: MRI IMAGING | Facility: HOSPITAL | Age: 86
End: 2021-12-07

## 2021-12-10 NOTE — TELEPHONE ENCOUNTER
MRI is not scheduled until 12/22/21. Ok for telephone visit per Dr. Callaway. Appointment has been rescheduled. Family notified.

## 2021-12-17 NOTE — TELEPHONE ENCOUNTER
Patient with walker and unable to get to clinic for INR due on 4/20/20.  Will schedule for home lab draw.  
2

## 2021-12-20 DIAGNOSIS — E83.42 HYPOMAGNESEMIA: ICD-10-CM

## 2021-12-21 NOTE — TELEPHONE ENCOUNTER
Rx Refill Note  Requested Prescriptions     Pending Prescriptions Disp Refills   • magnesium oxide (MAGOX) 400 (241.3 Mg) MG tablet tablet [Pharmacy Med Name: MAGNESIUM OXIDE 400 (241.3 Mg) MG Tablet] 90 tablet      Sig: TAKE 1 TABLET EVERY DAY      Last office visit with prescribing clinician: Visit date not found [facility] 11/16/21      Next office visit with prescribing clinician: Visit date not found            Radha Ellis MA  12/21/21, 15:55 EST

## 2021-12-22 ENCOUNTER — HOSPITAL ENCOUNTER (OUTPATIENT)
Dept: MRI IMAGING | Facility: HOSPITAL | Age: 86
Discharge: HOME | End: 2021-12-22
Admitting: NEUROLOGICAL SURGERY

## 2021-12-22 VITALS
HEIGHT: 66 IN | HEART RATE: 70 BPM | BODY MASS INDEX: 32.3 KG/M2 | OXYGEN SATURATION: 99 % | TEMPERATURE: 98.7 F | SYSTOLIC BLOOD PRESSURE: 140 MMHG | RESPIRATION RATE: 18 BRPM | DIASTOLIC BLOOD PRESSURE: 77 MMHG | WEIGHT: 201 LBS

## 2021-12-22 DIAGNOSIS — I62.00 SUBDURAL HEMORRHAGE (HCC): ICD-10-CM

## 2021-12-22 PROCEDURE — 70551 MRI BRAIN STEM W/O DYE: CPT

## 2021-12-22 NOTE — NURSING NOTE
Patient arrived in xray triage for an MRI of the brain. Protective goggles and mask in place with all patient interactions today.

## 2022-01-10 ENCOUNTER — OFFICE VISIT (OUTPATIENT)
Dept: NEUROSURGERY | Facility: CLINIC | Age: 87
End: 2022-01-10

## 2022-01-10 ENCOUNTER — TELEPHONE (OUTPATIENT)
Dept: NEUROSURGERY | Facility: CLINIC | Age: 87
End: 2022-01-10

## 2022-01-10 DIAGNOSIS — I62.00 SUBDURAL HEMORRHAGE: Primary | ICD-10-CM

## 2022-01-10 PROCEDURE — 99214 OFFICE O/P EST MOD 30 MIN: CPT | Performed by: NEUROLOGICAL SURGERY

## 2022-01-10 NOTE — TELEPHONE ENCOUNTER
Pt will keep tele-visit as scheduled. She will call on 3 way her mother is currently quarantined due to her son testing positve for covid.

## 2022-01-24 ENCOUNTER — TELEMEDICINE (OUTPATIENT)
Dept: FAMILY MEDICINE CLINIC | Facility: CLINIC | Age: 87
End: 2022-01-24

## 2022-01-24 ENCOUNTER — APPOINTMENT (OUTPATIENT)
Dept: GENERAL RADIOLOGY | Facility: HOSPITAL | Age: 87
End: 2022-01-24

## 2022-01-24 ENCOUNTER — HOSPITAL ENCOUNTER (EMERGENCY)
Facility: HOSPITAL | Age: 87
Discharge: HOME | End: 2022-01-24
Attending: EMERGENCY MEDICINE | Admitting: EMERGENCY MEDICINE

## 2022-01-24 VITALS
TEMPERATURE: 97.3 F | WEIGHT: 186 LBS | RESPIRATION RATE: 19 BRPM | HEIGHT: 64 IN | SYSTOLIC BLOOD PRESSURE: 128 MMHG | DIASTOLIC BLOOD PRESSURE: 81 MMHG | BODY MASS INDEX: 31.76 KG/M2 | OXYGEN SATURATION: 96 % | HEART RATE: 70 BPM

## 2022-01-24 DIAGNOSIS — D72.819 LEUKOPENIA, UNSPECIFIED TYPE: ICD-10-CM

## 2022-01-24 DIAGNOSIS — U07.1 COVID-19: Primary | ICD-10-CM

## 2022-01-24 DIAGNOSIS — U07.1 PNEUMONIA DUE TO COVID-19 VIRUS: Primary | ICD-10-CM

## 2022-01-24 DIAGNOSIS — D69.6 THROMBOCYTOPENIA: ICD-10-CM

## 2022-01-24 DIAGNOSIS — J12.82 PNEUMONIA DUE TO COVID-19 VIRUS: Primary | ICD-10-CM

## 2022-01-24 LAB
ALBUMIN SERPL-MCNC: 3.5 G/DL (ref 3.5–5.2)
ALBUMIN/GLOB SERPL: 0.9 G/DL
ALP SERPL-CCNC: 95 U/L (ref 39–117)
ALT SERPL W P-5'-P-CCNC: 13 U/L (ref 1–33)
ANION GAP SERPL CALCULATED.3IONS-SCNC: 11.2 MMOL/L (ref 5–15)
AST SERPL-CCNC: 30 U/L (ref 1–32)
BASOPHILS # BLD MANUAL: 0.02 10*3/MM3 (ref 0–0.2)
BASOPHILS NFR BLD MANUAL: 1 % (ref 0–1.5)
BILIRUB SERPL-MCNC: 0.8 MG/DL (ref 0–1.2)
BUN SERPL-MCNC: 7 MG/DL (ref 8–23)
BUN/CREAT SERPL: 11.7 (ref 7–25)
CALCIUM SPEC-SCNC: 9.8 MG/DL (ref 8.6–10.5)
CHLORIDE SERPL-SCNC: 105 MMOL/L (ref 98–107)
CO2 SERPL-SCNC: 23.8 MMOL/L (ref 22–29)
CREAT SERPL-MCNC: 0.6 MG/DL (ref 0.57–1)
DEPRECATED RDW RBC AUTO: 47.1 FL (ref 37–54)
EOSINOPHIL # BLD MANUAL: 0.02 10*3/MM3 (ref 0–0.4)
EOSINOPHIL NFR BLD MANUAL: 1 % (ref 0.3–6.2)
ERYTHROCYTE [DISTWIDTH] IN BLOOD BY AUTOMATED COUNT: 13.3 % (ref 12.3–15.4)
GFR SERPL CREATININE-BSD FRML MDRD: 115 ML/MIN/1.73
GLOBULIN UR ELPH-MCNC: 3.9 GM/DL
GLUCOSE SERPL-MCNC: 93 MG/DL (ref 65–99)
HCT VFR BLD AUTO: 42.9 % (ref 34–46.6)
HGB BLD-MCNC: 14.3 G/DL (ref 12–15.9)
HOLD SPECIMEN: NORMAL
HOLD SPECIMEN: NORMAL
LYMPHOCYTES # BLD MANUAL: 1.43 10*3/MM3 (ref 0.7–3.1)
LYMPHOCYTES NFR BLD MANUAL: 8 % (ref 5–12)
MCH RBC QN AUTO: 32.1 PG (ref 26.6–33)
MCHC RBC AUTO-ENTMCNC: 33.3 G/DL (ref 31.5–35.7)
MCV RBC AUTO: 96.4 FL (ref 79–97)
MONOCYTES # BLD: 0.19 10*3/MM3 (ref 0.1–0.9)
NEUTROPHILS # BLD AUTO: 0.68 10*3/MM3 (ref 1.7–7)
NEUTROPHILS NFR BLD MANUAL: 29 % (ref 42.7–76)
NT-PROBNP SERPL-MCNC: 762 PG/ML (ref 0–1800)
PLAT MORPH BLD: NORMAL
PLATELET # BLD AUTO: 123 10*3/MM3 (ref 140–450)
PMV BLD AUTO: 9.7 FL (ref 6–12)
POTASSIUM SERPL-SCNC: 4.2 MMOL/L (ref 3.5–5.2)
PROT SERPL-MCNC: 7.4 G/DL (ref 6–8.5)
RBC # BLD AUTO: 4.45 10*6/MM3 (ref 3.77–5.28)
RBC MORPH BLD: NORMAL
SODIUM SERPL-SCNC: 140 MMOL/L (ref 136–145)
TROPONIN T SERPL-MCNC: <0.01 NG/ML (ref 0–0.03)
VARIANT LYMPHS NFR BLD MANUAL: 61 % (ref 19.6–45.3)
WBC MORPH BLD: NORMAL
WBC NRBC COR # BLD: 2.35 10*3/MM3 (ref 3.4–10.8)
WHOLE BLOOD HOLD SPECIMEN: NORMAL
WHOLE BLOOD HOLD SPECIMEN: NORMAL

## 2022-01-24 PROCEDURE — 85007 BL SMEAR W/DIFF WBC COUNT: CPT | Performed by: EMERGENCY MEDICINE

## 2022-01-24 PROCEDURE — 93005 ELECTROCARDIOGRAM TRACING: CPT | Performed by: EMERGENCY MEDICINE

## 2022-01-24 PROCEDURE — 36415 COLL VENOUS BLD VENIPUNCTURE: CPT

## 2022-01-24 PROCEDURE — 83880 ASSAY OF NATRIURETIC PEPTIDE: CPT | Performed by: EMERGENCY MEDICINE

## 2022-01-24 PROCEDURE — 99283 EMERGENCY DEPT VISIT LOW MDM: CPT

## 2022-01-24 PROCEDURE — 85025 COMPLETE CBC W/AUTO DIFF WBC: CPT | Performed by: EMERGENCY MEDICINE

## 2022-01-24 PROCEDURE — 93010 ELECTROCARDIOGRAM REPORT: CPT | Performed by: INTERNAL MEDICINE

## 2022-01-24 PROCEDURE — 71045 X-RAY EXAM CHEST 1 VIEW: CPT

## 2022-01-24 PROCEDURE — 99213 OFFICE O/P EST LOW 20 MIN: CPT | Performed by: FAMILY MEDICINE

## 2022-01-24 PROCEDURE — 84484 ASSAY OF TROPONIN QUANT: CPT | Performed by: EMERGENCY MEDICINE

## 2022-01-24 PROCEDURE — 80053 COMPREHEN METABOLIC PANEL: CPT | Performed by: EMERGENCY MEDICINE

## 2022-01-24 RX ORDER — SODIUM CHLORIDE 0.9 % (FLUSH) 0.9 %
10 SYRINGE (ML) INJECTION AS NEEDED
Status: DISCONTINUED | OUTPATIENT
Start: 2022-01-24 | End: 2022-01-24 | Stop reason: HOSPADM

## 2022-01-24 NOTE — ED NOTES
Pt incontinent of stool- this RN and Kesha MCLEAN changed pt's brief, chux pads and linens. Pt hygiene provided.      Milady Moss, RN  01/24/22 5472

## 2022-01-24 NOTE — DISCHARGE INSTRUCTIONS
You have been seen for suspected or confirmed novel coronavirus, also known as COVID-19.    Your symptoms are mild today and you have been deemed appropriate for discharge home.  You should take Tylenol as needed for fever or aches.  You may also take over-the-counter cough medicines.    You should quarantine at home for the next 5 days.  If you have to be in contact with family, please wear the surgical mask provided to you today.  Wash your hands frequently.    Return to the ER should you develop worsening symptoms, particularly shortness of breath, or oxygen level below 92%.     You have been referred for monoclonal antibody therapy.  Due to limited availability of this medication at this time it is not guaranteed that she will be scheduled for this.

## 2022-01-24 NOTE — ED TRIAGE NOTES
Positive for covid sat.  He has cough, soa, and loss of appetite    Patient was placed in face mask during first look triage.  Patient was wearing a face mask throughout encounter.  I wore personal protective equipment throughout the encounter.  Hand hygiene was performed before and after patient encounter.

## 2022-01-24 NOTE — PROGRESS NOTES
Chief Complaint  Covid-19 Home Monitoring Video Visit    Subjective          Brittany Villeda presents to [facility]  History of Present Illness     You have chosen to receive care through a telehealth visit.  Do you consent to use a video/audio connection for your medical care today? Yes    4 days ago patient began having upper respiratory symptoms.  There is COVID-19 in the household.  3 days ago she had a positive home COVID-19 test and started become lethargic with decreased appetite.  Through the weekend her breathing has become more labored.  She is coughing.  Coughing up sputum with blood in it at times.  They have not checked her temperature.  She does not feel feverish she states.  And the family states she does not feel warm.  They checked her oxygen level earlier this weekend and it was 97.  But I do not think it is correct.  The states she is definitely short of breath.  Especially this morning.  They have oxygen at home but not using it.  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      Objective   Vital Signs:   There were no vitals taken for this visit.    Physical Exam  Constitutional:       Comments: Patient does not look well.  She is lethargic.  She can speak to me but has to stop in between sentences to catch her breath.  Her color seems off.  She appears tachypneic.  She is definitely working to breathe.        Result Review :                 Assessment and Plan    Diagnoses and all orders for this visit:    1. Pneumonia due to COVID-19 virus (Primary)      COVID-19 pneumonia.  She is having some mild to moderate respiratory distress.  There are no good home treatment options.  The family asked for ivermectin.  This will not work.  I  recommend the family call 911 immediately and get the patient to the hospital.  If not, she could die at home.  Family and patient aware of this.  I will follow up with her in a couple weeks hopefully.    Duration of visit 15 minutes      Follow Up   No follow-ups on file.  Patient was given instructions and counseling regarding her condition or for health maintenance advice. Please see specific information pulled into the AVS if appropriate.

## 2022-01-25 NOTE — ED PROVIDER NOTES
EMERGENCY DEPARTMENT ENCOUNTER    Room Number:  15/15  Date seen:  1/25/2022  PCP: Mega Esparza MD  Historian: patient, daughter      HPI:  Chief Complaint: short of breath, COVID   A complete HPI/ROS/PMH/PSH/SH/FH are unobtainable due to: nothing  Context: Brittany Villeda is a 86 y.o. female who presents to the ED c/o short of breath.  She did a virtual visit with PCP who recommended she come to ER.  She tested positive for COVID Saturday at home, symptoms started 5 days ago with general malaise.  O2 sats at home 96% and above.  No n/v/d.  No chest pain.  She has not been vaccinated.             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   • Leukopenia 09/12/2016   • MGUS (monoclonal gammopathy of unknown significance) 09/16/2016   • 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[facility]) 11/30/2017   • Other chest pain 12/24/2017   • Lichen sclerosus 08/23/2017   • Heart block 10/30/2018   • Sleep-related hypoxia 12/22/2018   • Bradycardia 12/29/2018   • Shoulder arthritis 01/28/2019   • History of atrial fibrillation 03/19/2019   • Pacemaker 03/19/2019   • Paroxysmal SVT (supraventricular tachycardia) ([facility]) 05/08/2019   • History of chest pain 05/08/2019   • Palpitations 05/08/2019   • Atrial fibrillation ([facility]) 10/06/2019   • Rectal bleeding 10/15/2019   • Arthritis 12/13/2019   • Ascending aortic aneurysm ([facility]) 08/24/2020   • Mediastinal lymphadenopathy 09/09/2020   • Immobility 11/20/2020   • Left knee pain 11/20/2020   • Hemarthrosis of left knee  11/20/2020   • Anemia    • Obesity (BMI 30-39.9)    • Generalized weakness 04/08/2021   • Dysautonomia ([facility]) 04/09/2021   • Weakness of both lower extremities 04/09/2021   • Macrocytosis 04/11/2021   • Hypercalcemia 04/11/2021   • Arthritis of right wrist 04/13/2021   • Hyperparathyroidism ([facility]) 04/28/2021   • Choledocholithiasis 05/09/2021   • Essential hypertension 05/10/2021   • Hyperglycemia 05/10/2021   • Epigastric pain 05/12/2021   • Duodenal ulcer 05/13/2021   • Hemorrhagic gastritis 05/13/2021   • Exertional dyspnea 06/22/2021   • COPD (chronic obstructive pulmonary disease) ([facility])    • Functional quadriplegia ([facility]) 11/20/2020   • Hip pain 04/12/2018   • Osteoarthritis of glenohumeral joint 07/12/2018   • Other malaise and fatigue 05/25/2021   • Shoulder pain 04/12/2018   • ICH (intracerebral hemorrhage) ([facility]) 08/05/2021     Resolved Ambulatory Problems     Diagnosis Date Noted   • Abnormal weight loss 05/12/2016   • Tachycardia 05/12/2016   • Nausea and vomiting 05/12/2016   • Hyperthyroidism 05/12/2016   • Fatigue 05/12/2016   • Dizziness 05/12/2016   • Diarrhea 05/12/2016   • Abnormal thyroid stimulating hormone (TSH) level 05/12/2016   • Abdominal pain 05/12/2016   • Abnormal EKG 06/30/2016   • Chronic anticoagulation 11/20/2020   • Acute UTI (urinary tract infection) 04/08/2021   • Spondylosis of lumbosacral region without myelopathy or radiculopathy 04/09/2021   • Hypomagnesemia 04/13/2021   • Subdural hematoma ([facility]) 08/05/2021     Past Medical History:   Diagnosis Date   • Arrhythmia    • Asthma    • Chest pain    • Colitis    • GERD (gastroesophageal reflux disease)    • Hypertension    • Hypertensive heart disease    • Kidney stone    • Low back pain    • Nephrolithiasis    • Obesity    • Paroxysmal atrial fibrillation (HCC)    • Peptic ulceration    • Persistent atrial fibrillation ([facility])    • PSVT (paroxysmal supraventricular tachycardia) ([facility])    • Rectal bleed    • Sleep apnea    • Systemic  hypertension    • Ventricular tachycardia (HCC)    • Vertigo          PAST SURGICAL HISTORY  Past Surgical History:   Procedure Laterality Date   • BACK SURGERY      lumbar fusion   • DUSTY HOLE Left 8/8/2021    Procedure: Left-sided dusty holes for evacuation of subdural hematoma;  Surgeon: Gustavo Callaway MD;  Location: [facility] MAIN OR;  Service: Neurosurgery;  Laterality: Left;   • CARDIAC ELECTROPHYSIOLOGY PROCEDURE N/A 11/7/2018    Procedure: Pacemaker DC new   [place];  Surgeon: Jesus Granda MD;  Location: [facility] CATH INVASIVE LOCATION;  Service: Cardiology   • CHOLECYSTECTOMY     • COLONOSCOPY  06/16/2014    colitis, cryptitis,  tics, NBIH, TA w/low grade dysplasia   • COLONOSCOPY N/A 10/28/2019    Procedure: COLONOSCOPY WITH COLD AND HOT POLYPECTOMIES;  Surgeon: Micaela English MD;  Location: [facility] ENDOSCOPY;  Service: Gastroenterology   • ENDOSCOPY N/A 5/13/2021    Procedure: ESOPHAGOGASTRODUODENOSCOPY with BX;  Surgeon: Stefan Mcfadden MD;  Location: [facility] ENDOSCOPY;  Service: Gastroenterology;  Laterality: N/A;  EPIGASTRIC PAIN  --DUODENAL ULCER, HEMORRHAGIC GASTRITIS, HIATAL HERNIA    • HEMORRHOIDECTOMY     • HYSTERECTOMY     • JOINT REPLACEMENT     • KNEE ARTHROPLASTY     • MYOMECTOMY     • PACEMAKER IMPLANTATION     • SHOULDER SURGERY     • SINUS SURGERY     • TOE NAIL AMPUTATION  03/04/2019   • TONSILLECTOMY           FAMILY HISTORY  Family History   Problem Relation Age of Onset   • Diabetes Mother    • Breast cancer Sister    • Kidney cancer Sister    • Heart disease Sister    • Prostate cancer Brother    • Prostate cancer Brother    • Prostate cancer Brother    • Malig Hyperthermia Neg Hx          SOCIAL HISTORY  Social History     Socioeconomic History   • Marital status:    • Number of children: 10   • Years of education: High School   Tobacco Use   • Smoking status: Former Smoker     Packs/day: 1.50     Years: 10.00     Pack years: 15.00     Start date: 1953     Quit  date:      Years since quittin.1   • Smokeless tobacco: Never Used   • Tobacco comment: QUIT SMOKING    Vaping Use   • Vaping Use: Never used   Substance and Sexual Activity   • Alcohol use: No   • Drug use: Defer   • Sexual activity: Defer         ALLERGIES  Codeine, Amitriptyline, Amoxicillin-pot clavulanate, Aspirin, Bactrim [sulfamethoxazole-trimethoprim], Carisoprodol-aspirin-codeine, Iodinated diagnostic agents, Latex, Naproxen, Nsaids, Soma compound with codeine [carisoprodol-aspirin-codeine], Sulfa antibiotics, and Tramadol        REVIEW OF SYSTEMS  Review of Systems   A complete ROS is negative other than in HPI above.       PHYSICAL EXAM  ED Triage Vitals [22 1413]   Temp Heart Rate Resp BP SpO2   97.3 °F (36.3 °C) 82 18 121/95 99 %      Temp src Heart Rate Source Patient Position BP Location FiO2 (%)   Tympanic Monitor -- -- --         GENERAL: awake and alert, no acute distress  HENT: nares patent  EYES: no scleral icterus  CV: regular rhythm, normal rate  RESPIRATORY: normal effort, CTA B  ABDOMEN: soft, NT, ND  MUSCULOSKELETAL: no deformity  NEURO: alert, moves all extremities, follows commands  PSYCH:  calm, cooperative  SKIN: warm, dry    Vital signs and nursing notes reviewed.          LAB RESULTS  Recent Results (from the past 24 hour(s))   Comprehensive Metabolic Panel    Collection Time: 22  5:47 PM    Specimen: Blood   Result Value Ref Range    Glucose 93 65 - 99 mg/dL    BUN 7 (L) 8 - 23 mg/dL    Creatinine 0.60 0.57 - 1.00 mg/dL    Sodium 140 136 - 145 mmol/L    Potassium 4.2 3.5 - 5.2 mmol/L    Chloride 105 98 - 107 mmol/L    CO2 23.8 22.0 - 29.0 mmol/L    Calcium 9.8 8.6 - 10.5 mg/dL    Total Protein 7.4 6.0 - 8.5 g/dL    Albumin 3.50 3.50 - 5.20 g/dL    ALT (SGPT) 13 1 - 33 U/L    AST (SGOT) 30 1 - 32 U/L    Alkaline Phosphatase 95 39 - 117 U/L    Total Bilirubin 0.8 0.0 - 1.2 mg/dL    eGFR  African Amer 115 >60 mL/min/1.73    Globulin 3.9 gm/dL    A/G Ratio 0.9  g/dL    BUN/Creatinine Ratio 11.7 7.0 - 25.0    Anion Gap 11.2 5.0 - 15.0 mmol/L   BNP    Collection Time: 01/24/22  5:47 PM    Specimen: Blood   Result Value Ref Range    proBNP 762.0 0.0-1,800.0 pg/mL   Troponin    Collection Time: 01/24/22  5:47 PM    Specimen: Blood   Result Value Ref Range    Troponin T <0.010 0.000 - 0.030 ng/mL   Green Top (Gel)    Collection Time: 01/24/22  5:47 PM   Result Value Ref Range    Extra Tube Hold for add-ons.    Lavender Top    Collection Time: 01/24/22  5:47 PM   Result Value Ref Range    Extra Tube hold for add-on    Gold Top - SST    Collection Time: 01/24/22  5:47 PM   Result Value Ref Range    Extra Tube Hold for add-ons.    Light Blue Top    Collection Time: 01/24/22  5:47 PM   Result Value Ref Range    Extra Tube hold for add-on    CBC Auto Differential    Collection Time: 01/24/22  5:47 PM    Specimen: Blood   Result Value Ref Range    WBC 2.35 (L) 3.40 - 10.80 10*3/mm3    RBC 4.45 3.77 - 5.28 10*6/mm3    Hemoglobin 14.3 12.0 - 15.9 g/dL    Hematocrit 42.9 34.0 - 46.6 %    MCV 96.4 79.0 - 97.0 fL    MCH 32.1 26.6 - 33.0 pg    MCHC 33.3 31.5 - 35.7 g/dL    RDW 13.3 12.3 - 15.4 %    RDW-SD 47.1 37.0 - 54.0 fl    MPV 9.7 6.0 - 12.0 fL    Platelets 123 (L) 140 - 450 10*3/mm3   Manual Differential    Collection Time: 01/24/22  5:47 PM    Specimen: Blood   Result Value Ref Range    Neutrophil % 29.0 (L) 42.7 - 76.0 %    Lymphocyte % 61.0 (H) 19.6 - 45.3 %    Monocyte % 8.0 5.0 - 12.0 %    Eosinophil % 1.0 0.3 - 6.2 %    Basophil % 1.0 0.0 - 1.5 %    Neutrophils Absolute 0.68 (L) 1.70 - 7.00 10*3/mm3    Lymphocytes Absolute 1.43 0.70 - 3.10 10*3/mm3    Monocytes Absolute 0.19 0.10 - 0.90 10*3/mm3    Eosinophils Absolute 0.02 0.00 - 0.40 10*3/mm3    Basophils Absolute 0.02 0.00 - 0.20 10*3/mm3    RBC Morphology Normal Normal    WBC Morphology Normal Normal    Platelet Morphology Normal Normal       Ordered the above labs and reviewed the results.        RADIOLOGY  XR Chest 1  View    Result Date: 1/24/2022  PORTABLE RADIOGRAPHIC VIEW OF THE CHEST  CLINICAL HISTORY: Shortness of air. Triage protocol.  FINDINGS: Left subclavian approach pacer device is identified with leads seen within the expected location of the right atrium and right ventricle. These are unchanged since the prior study dated 10/25/2021. There is chronic elevation of the right hemidiaphragm which was also noted on the prior exam. Mild right basilar atelectatic changes are seen. Additional areas of opacification within the left midlung zone could represent areas of atelectatic change or pneumonia. Please correlate with clinical data. The cardiomediastinal silhouette is unchanged in appearance since the prior exam and is again remarkable for some atherosclerotic calcifications. Incidental note is made of prominent degenerative phenomena within both shoulders.  This report was finalized on 1/24/2022 6:12 PM by Dr. Froy Lindsey M.D.        Ordered the above noted radiological studies. Reviewed by me in PACS.            PROCEDURES  Procedures              MEDICATIONS GIVEN IN ER  Medications - No data to display                MEDICAL DECISION MAKING, PROGRESS, and CONSULTS    All labs have been independently reviewed by me.  All radiology studies have been reviewed by me and discussed with radiologist dictating the report.   EKG's independently viewed and interpreted by me.  Discussion below represents my analysis of pertinent findings related to patient's condition, differential diagnosis, treatment plan and final disposition.      Differential diagnosis includes but is not limited to:  Pneumonia   Bronchitis  covid pneumonia  CHF    ED Course as of 01/25/22 0048   Mon Jan 24, 2022   1752 Chest x-ray independently interpreted in PACS.  There is a pacemaker in place.  There is elevation of the right hemidiaphragm.  No definite pleural effusion, mild infiltrate or atelectasis left midlung [JR]   1841 WBC(!): 2.35 [JR]   1841  Platelets(!): 123 [JR]   1841 Troponin T: <0.010 [JR]   1841 Creatinine: 0.60 [JR]   1841 ALT (SGPT): 13 [JR]   1841 AST (SGOT): 30 [JR]   1841 Patient has a chronic leukopenia, mild thrombocytopenia.  Kidney function and LFTs appear normal today.  Her chest x-ray demonstrates chronic elevation of the right hemidiaphragm, atelectasis or mild infiltrate left midlung zone.  She is not hypoxic.  Her family reports that she has been maintaining sats of 96 and above at home.  Currently O2 saturations are 98% on room air.  They believe that her first symptom onset was about 5 days ago.  She has not been vaccinated against COVID-19.  I think that patient could potentially benefit from monoclonal antibody therapy.  I explained to patient and her family that I cannot guarantee the availability of this medication due to limited supply at this time but I have talked with him about the risk and benefits and also explained that this is not an FDA approved medication.  I have placed order in hopes that she may be able to get this within the next 2 days. [JR]   1843 I also explained the patient and her daughter that although she does not currently have low oxygen level, she could still develop Covid pneumonia and low oxygen level within the next for 5 days.  I recommended they monitor her oxygen level closely and return to the emergency department for O2 saturations less than 92%. [JR]   1905 EKG          EKG time: 1759  Rhythm/Rate: Sinus rhythm, 70  P waves and CO: Normal  QRS, axis: LBBB  ST and T waves: No acute ischemic changes    Interpreted Contemporaneously by me, independently viewed  Similar compared to prior 10/25/2021       [JR]   1906 proBNP: 762.0 [JR]   1906 SpO2: 99 % [JR]      ED Course User Index  [JR] Tony Castro MD              Patient was placed in face mask in first look. Patient was wearing facemask when I entered the room and throughout our encounter. I wore full protective equipment throughout  this patient encounter including a face mask, eye shield, gown and gloves. Hand hygiene was performed before donning protective equipment and after removal when leaving the room.      DIAGNOSIS  Final diagnoses:   COVID-19   Leukopenia, unspecified type   Thrombocytopenia (HCC)         DISPOSITION  DISCHARGE    Patient discharged in stable condition.    Reviewed implications of results, diagnosis, meds, responsibility to follow up, warning signs and symptoms of possible worsening, potential complications and reasons to return to ER.    Patient/Family voiced understanding of above instructions.    Discussed plan for discharge, as there is no emergent indication for admission. Patient referred to primary care provider for BP management due to today's BP. Pt/family is agreeable and understands need for follow up and repeat testing.  Pt is aware that discharge does not mean that nothing is wrong but it indicates no emergency is present that requires admission and they must continue care with follow-up as given below or physician of their choice.     FOLLOW-UP  [facility]  [address] [address] [phone]  Schedule an appointment as soon as possible for a visit      Mega Esparza MD  [address] [address]  [phone]    Schedule an appointment as soon as possible for a visit            Medication List      No changes were made to your prescriptions during this visit.                   Latest Documented Vital Signs:  As of 00:48 EST  BP- 128/81 HR- 70 Temp- 97.3 °F (36.3 °C) (Tympanic) O2 sat- 96%        --    Please note that portions of this were completed with a voice recognition program.            Tony Castro MD  01/25/22 0051

## 2022-01-27 ENCOUNTER — PATIENT OUTREACH (OUTPATIENT)
Dept: CASE MANAGEMENT | Facility: OTHER | Age: 87
End: 2022-01-27

## 2022-01-27 LAB — QT INTERVAL: 450 MS

## 2022-01-27 NOTE — OUTREACH NOTE
Ambulatory Case Management Note    ED Potential Covid Discharge Follow-up  Talked with patient. Discussed 1/24/22 ED visit regarding COVID 19; cough and thrombocytopenia. Patient had a positive home COVID 19 test on 1/21/22; had PCP telemedicine appointment wne 1/31/22 PCP follow up scheduled.  Patient compliant with ED recommendations and under quarantine. O2 SAT being monitored with O2 no lower then 96%.   Patient reports symptoms of: Decrease in energy; feeling better and no  difficulty with chest pain; SOB; appetite or sleeping.  Patient reports no barriers in obtaining : food; medication and has transportation.    Reviewed ED AVS recommendations; education; quarantine education; education regarding being free of fever for 24 hours without use of Tylenol; hydration;  24/7 Nurse Triage Line; COVID 19 information telephone line; ACM contact information;  My Chart; Telehealth appointment availability;  affordability of food; medications; transportation; continued monitoring of symptoms; evaluation of worsening symptoms and establishing contact with PCP for follow up recommendations. Patient verbalized understanding. No further question or concerns voiced at this time.   General & Health Literacy Assessment    Questions/Answers      Most Recent Value   Assessment Completed With Patient   Living Arrangement Children  [Patient lives with son. Has assistance as needed with activities from family]   Type of Residence Private Residence   Home Care Services No   Bed or Wheelchair Confined No   Difficulty Keeping Appointments No        Care Evaluation    Questions/Answers      Most Recent Value   Care Gaps Addressed Flu Shot,  Pneumonia Vaccine,  Colon Cancer Screening   Colon Cancer Screening Type Colonoscopy   Colonoscopy Status Up to Date (< 10 yrs)   Colon Cancer Screening Completion at [facility] or Other [facility]   Flu Shot Status Up to Date   Pneumonia Vaccine Status Up to Date   Care Gap Comments Daughter states  patient has not received COVID 19 vaccine   Other Patient Education/Resources  24/7 [facility] Nurse Call Line,  Advanced Care Planning,  MyChart   24/7 Nurse Call Line Education Method Verbal   ACP Education Method Verbal  [Completed]   MyChart Education Method Verbal  [Active ]   Advanced Directives: Patient Has   Medication Adherence Medications understood   Healthy Lifestyle (Self-Efficacy) recognizes when to contact medical assistance,  recognizes when to stop activity,  self-reports important symptoms to medical professional      SDOH updated and reviewed with the patient during this program:     Financial Resource Strain: Low Risk    • Difficulty of Paying Living Expenses: Not hard at all       Food Insecurity: No Food Insecurity   • Worried About Running Out of Food in the Last Year: Never true   • Ran Out of Food in the Last Year: Never true       Transportation Needs: No Transportation Needs   • Lack of Transportation (Medical): No   • Lack of Transportation (Non-Medical): No     Shira Ford RN  Ambulatory Case Management    1/27/2022, 16:11 EST

## 2022-01-28 ENCOUNTER — DOCUMENTATION (OUTPATIENT)
Dept: FAMILY MEDICINE CLINIC | Facility: CLINIC | Age: 87
End: 2022-01-28

## 2022-01-28 NOTE — PROGRESS NOTES
Phone call with patient's daughter.  We sent her to the emergency room after having some respiratory distress earlier this week.  At the emergency room her oxygen saturation levels were good and the patient was discharged.  Daughter states her O2 saturations remained in the 90% range.  Her respiratory distress is much less.  She has some diarrhea but it is getting better.  Patient is not vaccinated.  She is bedbound.  I recommend in the next few weeks to get her to the [facility] parking lot for the drive-through immunizations.  With severe symptoms they are going to go back to the emergency room.  Otherwise it sounds like she is progressing well.  They will call with concerns.

## 2022-01-31 ENCOUNTER — TELEPHONE (OUTPATIENT)
Dept: CARDIOLOGY | Facility: CLINIC | Age: 87
End: 2022-01-31

## 2022-01-31 ENCOUNTER — TELEMEDICINE (OUTPATIENT)
Dept: FAMILY MEDICINE CLINIC | Facility: CLINIC | Age: 87
End: 2022-01-31

## 2022-01-31 DIAGNOSIS — Z00.00 MEDICARE ANNUAL WELLNESS VISIT, SUBSEQUENT: Primary | ICD-10-CM

## 2022-01-31 DIAGNOSIS — U07.1 UPPER RESPIRATORY TRACT INFECTION DUE TO COVID-19 VIRUS: ICD-10-CM

## 2022-01-31 DIAGNOSIS — J06.9 UPPER RESPIRATORY TRACT INFECTION DUE TO COVID-19 VIRUS: ICD-10-CM

## 2022-01-31 PROCEDURE — 1170F FXNL STATUS ASSESSED: CPT | Performed by: FAMILY MEDICINE

## 2022-01-31 PROCEDURE — G0439 PPPS, SUBSEQ VISIT: HCPCS | Performed by: FAMILY MEDICINE

## 2022-01-31 PROCEDURE — 1160F RVW MEDS BY RX/DR IN RCRD: CPT | Performed by: FAMILY MEDICINE

## 2022-01-31 PROCEDURE — 99213 OFFICE O/P EST LOW 20 MIN: CPT | Performed by: FAMILY MEDICINE

## 2022-01-31 PROCEDURE — 96160 PT-FOCUSED HLTH RISK ASSMT: CPT | Performed by: FAMILY MEDICINE

## 2022-01-31 NOTE — PROGRESS NOTES
Chief Complaint  Medicare Wellness-subsequent    Subjective          Brittany Villeda presents to [facility] PRIMARY [facility]ARE  History of Present Illness     You have chosen to receive care through a telehealth visit.  Do you consent to use a video/audio connection for your medical care today? Yes    Follow-up COVID-19 URI syndrome.  At last check last week over the phone via video visit there was significant respiratory distress.  COVID-19 was in the household.  She was unvaccinated.  She was sent to the emergency room.  O2 saturations were found to be good.  She seems stable.  They sent her back.  Since then she is doing better.  Appetite is good.  No constipation.  She is able to get out of the bed to use the bedside commode and sometimes the bathroom.  She is having a little bit of pain right around the xiphoid process occasionally last for few moments.  Otherwise no pain.  No depression.  Appetite is good.  SHe does not feel short of breath.    Objective   Vital Signs:   There were no vitals taken for this visit.    Physical Exam  Constitutional:       Appearance: She is obese.      Comments: Patient is well-known to me.  She appears no acute distress.  She is able to breathe much better compared to last visit.  There is no tachypnea.  Her affect seems good.  She is smiling.        Result Review :                 Assessment and Plan    Diagnoses and all orders for this visit:    1. Medicare annual wellness visit, subsequent (Primary)    2. Upper respiratory tract infection due to COVID-19 virus      COVID-19 URI syndrome.  Unvaccinated.  The symptoms appear to be resolved.  No sequelae.  She has been advised to get the COVID-19 vaccination.  I will see her back in 3 or 4 months for recheck.  Sooner as needed.    Duration of visit 15 minutes      Follow Up   No follow-ups on file.  Patient was given instructions and counseling regarding her condition or for health maintenance advice. Please see  specific information pulled into the AVS if appropriate.

## 2022-01-31 NOTE — PROGRESS NOTES
The ABCs of the Annual Wellness Visit  Subsequent Medicare Wellness Visit    Chief Complaint   Patient presents with   • Medicare Wellness-subsequent      Subjective    History of Present Illness:  Brittany Villeda is a 86 y.o. female who presents for a Subsequent Medicare Wellness Visit.    The following portions of the patient's history were reviewed and   updated as appropriate: allergies, current medications, past family history, past medical history, past social history, past surgical history and problem list.    Compared to one year ago, the patient feels her physical   health is same to worse.    Compared to one year ago, the patient feels her mental   health is the same.    Recent Hospitalizations:  This patient has had a [facility] admission record on file within the last 365 days.  Recently seen in the emergency room.    Current Medical Providers:  Patient Care Team:  Mega Esparza MD as PCP - General (Family Medicine)  Micaela English MD as Consulting Physician (Gastroenterology)  Mary Grace Culp MD as Consulting Physician (Cardiology)  Jp Krause Jr., MD as Consulting Physician (Hematology and Oncology)  Yasmeen Pichardo [facility] as Pharmacist  Micaela English MD as Referring Physician (Gastroenterology)  Devon Valadez MD as Consulting Physician (Hematology and Oncology)  Rusty Interiano, PharmD as Pharmacist (Pharmacy)    Outpatient Medications Prior to Visit   Medication Sig Dispense Refill   • acetaminophen (TYLENOL) 500 MG tablet Take 1 tablet by mouth Every 6 (Six) Hours As Needed for Mild Pain .     • albuterol sulfate  (90 Base) MCG/ACT inhaler Inhale 2 puffs Every 4 (Four) Hours As Needed for Wheezing (or cough). 18 g 0   • docusate sodium (COLACE) 100 MG capsule Take 100 mg by mouth Every Night.     • fexofenadine (ALLEGRA) 180 MG tablet Take 180 mg by mouth Daily.     • magnesium oxide (MAGOX) 400 (241.3 Mg) MG tablet tablet TAKE 1 TABLET EVERY DAY 90 tablet 1   • mesalamine  (APRISO) 0.375 g 24 hr capsule Take 4 capsules by mouth Daily. 360 capsule 3   • pantoprazole (PROTONIX) 40 MG EC tablet TAKE 1 TABLET TWICE DAILY 180 tablet 1   • vitamin B-12 (CYANOCOBALAMIN) 1000 MCG tablet Take 1,000 mcg by mouth Daily.       No facility-administered medications prior to visit.       No opioid medication identified on active medication list. I have reviewed chart for other potential  high risk medication/s and harmful drug interactions in the elderly.          Aspirin is not on active medication list.  Aspirin use is not indicated based on review of current medical condition/s. Risk of harm outweighs potential benefits.  .    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[facility])   • Other chest pain   • Lichen sclerosus   • Heart block   • Sleep-related hypoxia   • Bradycardia   • Shoulder arthritis   • History of atrial fibrillation   • Pacemaker   • Paroxysmal SVT (supraventricular tachycardia) ([facility])   • History of chest pain   • Palpitations   • Atrial fibrillation ([facility])   • Rectal bleeding   • Arthritis   • Ascending aortic aneurysm ([facility])   • Mediastinal lymphadenopathy   • Immobility   • Left knee pain   • Hemarthrosis of left knee   • Anemia   • Obesity (BMI 30-39.9)   • Generalized weakness   • Dysautonomia (HCC)   • Weakness of both lower extremities   • Macrocytosis   • Hypercalcemia   • Arthritis of right wrist   • Hyperparathyroidism (HCC)   • Choledocholithiasis   • Essential hypertension   • Hyperglycemia   • Epigastric pain   • Duodenal ulcer   •  Hemorrhagic gastritis   • Exertional dyspnea   • COPD (chronic obstructive pulmonary disease) (HCC)   • Functional quadriplegia (HCC)   • Hip pain   • Osteoarthritis of glenohumeral joint   • Other malaise and fatigue   • Shoulder pain   • ICH (intracerebral hemorrhage) ([facility])     Advance Care Planning  Advance Directive is on file.  ACP discussion was held with the patient during this visit. Patient has an advance directive in EMR which is still valid.           Objective    There were no vitals filed for this visit.  BMI Readings from Last 1 Encounters:   22 31.93 kg/m²   BMI is above normal parameters. Recommendations include: exercise counseling    Does the patient have evidence of cognitive impairment? No    Physical Exam            HEALTH RISK ASSESSMENT    Smoking Status:  Social History     Tobacco Use   Smoking Status Former Smoker   • Packs/day: 1.50   • Years: 10.00   • Pack years: 15.00   • Start date:    • Quit date:    • Years since quittin.1   Smokeless Tobacco Never Used   Tobacco Comment    QUIT SMOKING      Alcohol Consumption:  Social History     Substance and Sexual Activity   Alcohol Use No     Fall Risk Screen:    STEADI Fall Risk Assessment was completed, and patient is at MODERATE risk for falls. Assessment completed on:2022    Depression Screening:  PHQ-2/PHQ-9 Depression Screening 2021   Little interest or pleasure in doing things 0   Feeling down, depressed, or hopeless 0   Trouble falling or staying asleep, or sleeping too much -   Feeling tired or having little energy -   Poor appetite or overeating -   Feeling bad about yourself - or that you are a failure or have let yourself or your family down -   Trouble concentrating on things, such as reading the newspaper or watching television -   Moving or speaking so slowly that other people could have noticed. Or the opposite - being so fidgety or restless that you have been moving around a lot more than usual  -   Thoughts that you would be better off dead, or of hurting yourself in some way -   Total Score 0   If you checked off any problems, how difficult have these problems made it for you to do your work, take care of things at home, or get along with other people? -       Health Habits and Functional and Cognitive Screening:  Functional & Cognitive Status 1/31/2022   Do you have difficulty preparing food and eating? No   Do you have difficulty bathing yourself, getting dressed or grooming yourself? Yes   Do you have difficulty using the toilet? No   Do you have difficulty moving around from place to place? Yes   Do you have trouble with steps or getting out of a bed or a chair? Yes   Current Diet Well Balanced Diet   Dental Exam Not up to date   Eye Exam Not up to date   Exercise (times per week) 0 times per week   Current Exercises Include No Regular Exercise   Current Exercise Activities Include -   Do you need help using the phone?  No   Are you deaf or do you have serious difficulty hearing?  Yes   Do you need help with transportation? Yes   Do you need help shopping? Yes   Do you need help preparing meals?  Yes   Do you need help with housework?  Yes   Do you need help with laundry? Yes   Do you need help taking your medications? No   Do you need help managing money? No   Do you ever drive or ride in a car without wearing a seat belt? No   Have you felt unusual stress, anger or loneliness in the last month? Yes   Who do you live with? Child   If you need help, do you have trouble finding someone available to you? No   Have you been bothered in the last four weeks by sexual problems? No   Do you have difficulty concentrating, remembering or making decisions? No       Age-appropriate Screening Schedule:  Refer to the list below for future screening recommendations based on patient's age, sex and/or medical conditions. Orders for these recommended tests are listed in the plan section. The patient has been provided  with a written plan.    Health Maintenance   Topic Date Due   • MAMMOGRAM  03/22/2021   • DXA SCAN  09/18/2021   • TDAP/TD VACCINES (2 - Td or Tdap) 01/01/2022   • INFLUENZA VACCINE  Completed   • ZOSTER VACCINE  Discontinued              Assessment/Plan   CMS Preventative Services Quick Reference  Risk Factors Identified During Encounter  Fall Risk-High or Moderate  Obesity/Overweight   The above risks/problems have been discussed with the patient.  Follow up actions/plans if indicated are seen below in the Assessment/Plan Section.  Pertinent information has been shared with the patient in the After Visit Summary.    Diagnoses and all orders for this visit:    1. Medicare annual wellness visit, subsequent (Primary)    2. Upper respiratory tract infection due to COVID-19 virus        Follow Up:   No follow-ups on file.     An After Visit Summary and PPPS were made available to the patient.

## 2022-02-01 NOTE — TELEPHONE ENCOUNTER
She will need to be seen in the office. I also discussed this with Dr. Culp who recommended she be seen in office as well.

## 2022-02-07 PROCEDURE — 93296 REM INTERROG EVL PM/IDS: CPT | Performed by: INTERNAL MEDICINE

## 2022-02-07 PROCEDURE — 93294 REM INTERROG EVL PM/LDLS PM: CPT | Performed by: INTERNAL MEDICINE

## 2022-02-08 NOTE — TELEPHONE ENCOUNTER
I know her next available for the visit she needs are pretty far out. She had appt for 2/11/21 but was canceled by provider. I called to notify her and daughter wanted to find out about the video visit.  Not sure if you are able to get her in earlier. What I offered her then (daughter) didn't work.

## 2022-02-09 NOTE — TELEPHONE ENCOUNTER
Left voicemail for patient's daughter, Amina to call and reschedule appointment in office.     Thank you  Carmen

## 2022-02-15 ENCOUNTER — TELEPHONE (OUTPATIENT)
Dept: FAMILY MEDICINE CLINIC | Facility: CLINIC | Age: 87
End: 2022-02-15

## 2022-02-15 NOTE — TELEPHONE ENCOUNTER
Caller: SOHA    Relationship: [facility]     Best call back number: [phone]    What orders are you requesting (i.e. lab or imaging): ORDERS FOR PHYSICAL THERAPY EVALUATION     In what timeframe would the patient need to come in: ASAP     Where will you receive your lab/imaging services: [facility]     Additional notes: N/A

## 2022-02-16 ENCOUNTER — TELEMEDICINE (OUTPATIENT)
Dept: FAMILY MEDICINE CLINIC | Facility: CLINIC | Age: 87
End: 2022-02-16

## 2022-02-16 DIAGNOSIS — R26.2 AMBULATORY DYSFUNCTION: Primary | ICD-10-CM

## 2022-02-16 PROCEDURE — 99213 OFFICE O/P EST LOW 20 MIN: CPT | Performed by: FAMILY MEDICINE

## 2022-02-16 NOTE — PROGRESS NOTES
Chief Complaint  Difficulty Walking    Subjective          Brittany Villeda presents to [facility]  History of Present Illness     You have chosen to receive care through a telehealth visit.  Do you consent to use a video/audio connection for your medical care today? Yes    Video visit at the request of family and physical therapy.  Patient has ambulatory dysfunction related to obesity and frail health.  They want to start physical therapy.  She overall states she is doing well.  The COVID is resolved.  She does not have shortness of breath or fever.  With regards ambulation she has trouble walking, especially beyond a couple of steps.  She uses a bedside commode.  She eats sitting up at the side of her bed.  With regards to her current status, she has no bedsores, no constipation.  She states her bowel movements are very regular every day at 1030.  No urinary difficulties.  And she otherwise feels well.  She has no trouble taking her medication.      Objective   Vital Signs:   There were no vitals taken for this visit.    Physical Exam  Constitutional:       Comments: The patient is well-known to me and she has no acute distress.  Her color is good.  She is alert.  She has no respiratory distress.        Result Review :                 Assessment and Plan    Diagnoses and all orders for this visit:    1. Ambulatory dysfunction (Primary)      Ambulatory dysfunction related to multiple medical problems including obesity and deconditioning.  At this time physical therapy is an excellent idea.  She is doing very well otherwise with her ADLs and IADLs.  Especially with the help of family.  Goal is to improve her ambulation, if only to the bathroom instead of the bedside commode.  I will see her back as scheduled.    Duration of visit approximately 10 minutes including preparation for the visit, discussion with patient, coordination with medical staff, and documentation.      Follow Up    No follow-ups on file.  Patient was given instructions and counseling regarding her condition or for health maintenance advice. Please see specific information pulled into the AVS if appropriate.

## 2022-02-24 ENCOUNTER — TELEPHONE (OUTPATIENT)
Dept: FAMILY MEDICINE CLINIC | Facility: CLINIC | Age: 87
End: 2022-02-24

## 2022-02-24 NOTE — TELEPHONE ENCOUNTER
Caller: KATELYN    Relationship: Home Health    Best call back number:[phone]     What orders are you requesting (i.e. lab or imaging): VERBAL    In what timeframe would the patient need to come in: ASAP    Where will you receive your lab/imaging services: IN HOME    Additional notes:KATELYN IS CALLING TO REQUEST CONTINUED PHYSICAL THERAPY 2 TIMES A WEEK FOR 6 WEEKS.    PLEASE ADVISE.

## 2022-03-01 NOTE — PLAN OF CARE
Goal Outcome Evaluation:  Plan of Care Reviewed With: patient, daughter           Outcome Summary: Pt seen for PT this afternoon. Pt asleep upon arrival and very fatigued/ grimacing in pain with any mobility. Pt required max A x1 to perform supine>sit. At EOB pt required min A intitially but then required mod A as she became more fatigued. Pt unable to hold her head up or use her BUE to support herself in sitting. Not appropriate to attempt transfers this date. Daughter present and insistent on bringing pt home. Pt has hopsital bed, wheelchair, ramp to enter, walker, BSC. Discussed that pt is very weak and will require assist x2 for all needs at home. PT is still recommending d/c to SNF for strengthening but family seens determined to bring pt home. Will need HH services and 24/7 assist.   1.	Pain Control as needed  2.	Walking with full weight bearing as tolerated, with assistive devices (walker/Cane as Needed)  3.	DVT Prophylaxis: Eliquis BID for 30 days  4.	PT as needed  5.	Follow up with Dr. Dukes as Outpatient in 10-14 Days after Discharge from the Hospital or Rehab. Call Office For Appointment.  6.	Remove Dressing Post-Op Day 10, with Daily Dressing Changes as Need.  7.	Ice affected area as Needed  8.	Keep Dressing  Clean and dry.

## 2022-03-14 ENCOUNTER — OFFICE VISIT (OUTPATIENT)
Dept: CARDIOLOGY | Facility: CLINIC | Age: 87
End: 2022-03-14

## 2022-03-14 VITALS
HEIGHT: 65 IN | BODY MASS INDEX: 32.65 KG/M2 | DIASTOLIC BLOOD PRESSURE: 76 MMHG | WEIGHT: 196 LBS | SYSTOLIC BLOOD PRESSURE: 112 MMHG | HEART RATE: 70 BPM

## 2022-03-14 DIAGNOSIS — I51.89 DIASTOLIC DYSFUNCTION: ICD-10-CM

## 2022-03-14 DIAGNOSIS — I44.2 THIRD DEGREE AV BLOCK: ICD-10-CM

## 2022-03-14 DIAGNOSIS — I47.1 PAROXYSMAL SVT (SUPRAVENTRICULAR TACHYCARDIA): ICD-10-CM

## 2022-03-14 DIAGNOSIS — I48.11 LONGSTANDING PERSISTENT ATRIAL FIBRILLATION: Primary | ICD-10-CM

## 2022-03-14 DIAGNOSIS — I77.810 DILATION OF THORACIC AORTA: ICD-10-CM

## 2022-03-14 PROCEDURE — 93000 ELECTROCARDIOGRAM COMPLETE: CPT | Performed by: NURSE PRACTITIONER

## 2022-03-14 PROCEDURE — 99214 OFFICE O/P EST MOD 30 MIN: CPT | Performed by: NURSE PRACTITIONER

## 2022-03-14 NOTE — PROGRESS NOTES
[facility]   [address], [address]  [place], [place], [address]    [phone]  [email or web address]           OUTPATIENT CLINIC FOLLOW UP NOTE    Patient Care Team:  Patient Care Team:  Mega Esparza MD as PCP - General (Family Medicine)  Micaela English MD as Consulting Physician (Gastroenterology)  Mary Grace Culp MD as Consulting Physician (Cardiology)  Jp Krause Jr., MD as Consulting Physician (Hematology and Oncology)  Yasmeen Pichardo RPH as Pharmacist  Micaela English MD as Referring Physician (Gastroenterology)  Devon Valadez MD as Consulting Physician (Hematology and Oncology)  Rusty Interiano PharmD as Pharmacist (Pharmacy)    Subjective:      Chief Complaint   Patient presents with   • Atrial Fibrillation       HPI:    Brittany Villeda is a 86 y.o. female.  Problem list:  1. Nonsustained ventricular tachycardia  a. Initially noted in 2010.  b. Nuclear stress test 2010: Negative for ischemia.  c. TTE 2010: Normal LV size and function, moderate left ventricular hypertrophy.  2. Paroxysmal atrial fibrillation  a. Initially noted 4/2013 she was placed on AV flaco blockade and warfarin at that time..  b. 14-day Zio Patch 2017: Episodes of SVT that were symptomatic.  3. Sick sinus syndrome  a. 24-hour Holter 2018: 65 pauses, 3 seconds longest duration.  Sinus rhythm was present throughout most of the study.  PACs were present 22.2% of the monitoring time, there was 14 episodes of atrial tachycardia lasting 4 beats.  There was also episodes of 2-1 AV block and questionable third-degree block.  b. Status post pacemaker placement 11/2018.  4. Chest pain  a. Nuclear stress test 2017: Negative for ischemia.  5. Valvular heart disease  a. TTE 2018: Normal systolic function, grade 1 diastolic dysfunction, borderline right ventricle enlargement, mild to moderate TR with RVSP of 48 mmHg.  The ascending aorta was mildly dilated at 3.8 cm.  b. TTE 4/2021: EF 60% with right  ventricular dysfunction, right atrial enlargement with severe left atrial enlargement, mild TR with normal RVSP.  6. Hypertension  7. Pulmonary hypertension  8. Diastolic dysfunction  9. Obstructive sleep apnea  10. Immobility  11. GI bleed  a. 2013, EGD and colonoscopy were performed.  EGD revealed mild erythema but was otherwise stable.  12. Thoracic aortic aneurysm  a. CTA of the chest and abdomen 5/2021: No pulmonary emboli, stable dilated ascending aorta of 4.1 cm with elevated right hemidiaphragm with atelectasis.  b. CTA of the chest 10/2021: Ascending thoracic aorta stable at 4.1 cm.  13. Intracerebral hemorrhage in 8/2021.  14. Obstructive sleep apnea    The patient presents today for follow-up.  Her primary cardiologist is Dr. Culp.    Since the patient was last seen she reports that she has been doing well from a cardiac standpoint.  She denies chest pain, palpitations, lower extremity edema, lightheadedness, syncope.  Does have mild dyspnea with exertion.  Denies bleeding diatheses.  Blood pressure has been well controlled.    Review of Systems:  Positive for mild dyspnea with exertion  Negative for exertional chest pain, lower extremity edema, palpitations, syncope.     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[facility])   • Other chest pain   • Lichen sclerosus   • Heart block   • Sleep-related hypoxia   • Bradycardia   • Shoulder arthritis   • History of atrial fibrillation   • Pacemaker   • Paroxysmal SVT (supraventricular tachycardia) ([facility])   • History of chest  pain   • Palpitations   • Atrial fibrillation (HCC)   • Rectal bleeding   • Arthritis   • Ascending aortic aneurysm (HCC)   • Mediastinal lymphadenopathy   • Immobility   • Left knee pain   • Hemarthrosis of left knee   • Anemia   • Obesity (BMI 30-39.9)   • Generalized weakness   • Dysautonomia (HCC)   • Weakness of both lower extremities   • Macrocytosis   • Hypercalcemia   • Arthritis of right wrist   • Hyperparathyroidism (HCC)   • Choledocholithiasis   • Essential hypertension   • Hyperglycemia   • Epigastric pain   • Duodenal ulcer   • Hemorrhagic gastritis   • Exertional dyspnea   • COPD (chronic obstructive pulmonary disease) (HCC)   • Functional quadriplegia (HCC)   • Hip pain   • Osteoarthritis of glenohumeral joint   • Other malaise and fatigue   • Shoulder pain   • ICH (intracerebral hemorrhage) (HCC)         Current Outpatient Medications:   •  acetaminophen (TYLENOL) 500 MG tablet, Take 1 tablet by mouth Every 6 (Six) Hours As Needed for Mild Pain ., Disp: , Rfl:   •  albuterol sulfate  (90 Base) MCG/ACT inhaler, Inhale 2 puffs Every 4 (Four) Hours As Needed for Wheezing (or cough)., Disp: 18 g, Rfl: 0  •  docusate sodium (COLACE) 100 MG capsule, Take 100 mg by mouth Every Night., Disp: , Rfl:   •  ELDERBERRY PO, Take 2 tablets by mouth Daily., Disp: , Rfl:   •  fexofenadine (ALLEGRA) 180 MG tablet, Take 180 mg by mouth As Needed., Disp: , Rfl:   •  magnesium oxide (MAGOX) 400 (241.3 Mg) MG tablet tablet, TAKE 1 TABLET EVERY DAY, Disp: 90 tablet, Rfl: 1  •  mesalamine (APRISO) 0.375 g 24 hr capsule, Take 4 capsules by mouth Daily., Disp: 360 capsule, Rfl: 3  •  pantoprazole (PROTONIX) 40 MG EC tablet, TAKE 1 TABLET TWICE DAILY, Disp: 180 tablet, Rfl: 1  •  vitamin B-12 (CYANOCOBALAMIN) 1000 MCG tablet, Take 1,000 mcg by mouth Daily., Disp: , Rfl:     Allergies   Allergen Reactions   • Codeine Hallucinations     Tolerates hydromorphone   • Amitriptyline Rash   • Amoxicillin-Pot Clavulanate Rash  "  • Aspirin Unknown - Low Severity     Patient doesn't know why   • Bactrim [Sulfamethoxazole-Trimethoprim] Rash   • Carisoprodol-Aspirin-Codeine Palpitations   • Iodinated Diagnostic Agents Rash   • Latex Rash   • Naproxen Rash   • Nsaids Unknown - Low Severity     unknown   • Soma Compound With Codeine [Carisoprodol-Aspirin-Codeine] Rash   • Sulfa Antibiotics Rash   • Tramadol Palpitations     heart races         reports that she quit smoking about 57 years ago. She started smoking about 69 years ago. She has a 15.00 pack-year smoking history. She has never used smokeless tobacco.      Objective:   Physical exam:  /76   Pulse 70   Ht 163.8 cm (64.5\")   Wt 88.9 kg (196 lb)   LMP  (LMP Unknown)   BMI 33.12 kg/m²   CONSTITUTIONAL: No acute distress  RESPIRATORY: Normal effort. Clear to auscultation bilaterally without wheezing or rales  CARDIOVASCULAR: Carotids with normal upstrokes without bruits.  Regular rate and irregular rhythm with normal S1 and S2. Without murmur, gallop or rub. Normal radial pulse. There is no lower extremity edema bilaterally.    Labs:    BUN   Date Value Ref Range Status   01/24/2022 7 (L) 8 - 23 mg/dL Final     Creatinine   Date Value Ref Range Status   01/24/2022 0.60 0.57 - 1.00 mg/dL Final   08/26/2020 1.10 0.60 - 1.30 mg/dL Final     Comment:     Serial Number: [id number]:  [id number]     Potassium   Date Value Ref Range Status   01/24/2022 4.2 3.5 - 5.2 mmol/L Final     ALT (SGPT)   Date Value Ref Range Status   01/24/2022 13 1 - 33 U/L Final     AST (SGOT)   Date Value Ref Range Status   01/24/2022 30 1 - 32 U/L Final       Lab Results   Component Value Date    CHOL 141 12/25/2017     Lab Results   Component Value Date    TRIG 55 12/25/2017     Lab Results   Component Value Date    HDL 64 (H) 12/25/2017     Lab Results   Component Value Date    LDL 66 12/25/2017     No components found for: LDLDIRECTC    Diagnostic Data:      ECG 12 Lead    Date/Time: 3/14/2022 4:02 " PM  Performed by: Saniya Cramer APRN  Authorized by: Saniya Cramer APRN   Comparison: compared with previous ECG from 1/24/2022  Rhythm: atrial fibrillation  Rate: normal  BPM: 70  Comments:  ms,  ms            Results for orders placed during the hospital encounter of 04/07/21    Adult Transthoracic Echo Complete W/ Cont if Necessary Per Protocol    Interpretation Summary  · The right atrial cavity is severely dilated.  · Left atrium is dilated severely  · Estimated left ventricular EF = 60% Left ventricular systolic function is normal.  · Left ventricular diastolic function was indeterminate.  · Mildly reduced right ventricular systolic function noted.  · Saline test results are negative.      Assessment and Plan:   Diagnoses and all orders for this visit:    Longstanding persistent atrial fibrillation (HCC) (Primary)  Paroxysmal SVT (supraventricular tachycardia) (HCC)  Third degree AV block (HCC)  -Status post permanent pacemaker placement in 2018.  Most recent device interrogation reveals 100% A. fib burden.  -Anticoagulation previously discontinued due to prior GI bleed and intracerebral hemorrhage.  -Routine device checks.    Diastolic dysfunction  -Appears euvolemic  -Continue to monitor clinically for now.    Dilation of thoracic aorta (HCC)  -Stable at 4.1 cm on most recent CT of the chest in 10/2021.  -Continue to monitor clinically now.  Will consider repeat CT of the chest at time of follow-up.    - Return in about 6 months (around 9/14/2022) for Next scheduled follow up with Dr. Culp.    Electronically signed by SHAHID Ricardo, 03/14/22, 2:29 PM EDT.

## 2022-04-25 ENCOUNTER — OFFICE VISIT (OUTPATIENT)
Dept: FAMILY MEDICINE CLINIC | Facility: CLINIC | Age: 87
End: 2022-04-25

## 2022-04-25 VITALS
SYSTOLIC BLOOD PRESSURE: 124 MMHG | HEIGHT: 65 IN | TEMPERATURE: 97.8 F | BODY MASS INDEX: 32.59 KG/M2 | HEART RATE: 89 BPM | WEIGHT: 195.6 LBS | DIASTOLIC BLOOD PRESSURE: 62 MMHG | OXYGEN SATURATION: 98 %

## 2022-04-25 DIAGNOSIS — R60.9 EDEMA, UNSPECIFIED TYPE: Primary | ICD-10-CM

## 2022-04-25 DIAGNOSIS — R79.9 ABNORMAL FINDING OF BLOOD CHEMISTRY, UNSPECIFIED: ICD-10-CM

## 2022-04-25 DIAGNOSIS — R63.4 WEIGHT LOSS: ICD-10-CM

## 2022-04-25 PROCEDURE — 99214 OFFICE O/P EST MOD 30 MIN: CPT | Performed by: FAMILY MEDICINE

## 2022-04-25 NOTE — PROGRESS NOTES
"Chief Complaint  Foot Swelling (Left foot swelling no pain )    Subjective          Brittany Villeda presents to [facility]  History of Present Illness     Patient was bedbound for a number of months.  She is diabetic now for about 3 or 4 weeks.  She is been walking a lot more lately.  Sitting up in a chair more now.  She is lost weight.  Diet habits are unchanged.  She feels fine.  No changes in her baseline shortness of breath.  No orthopnea.  No PND.  No chest pain.  The swollen ankles do not bother her.  The left was particularly swollen yesterday evening.  It is much better now.  She states she has had no pain.  No burning.  No discomfort.  No warmth there.  She otherwise feels fine.  She is currently taking no prescription medication other than pantoprazole.  Previous bleeding stomach ulcers on blood thinners.    Objective   Vital Signs:   /62   Pulse 89   Temp 97.8 °F (36.6 °C) (Temporal)   Ht 163.8 cm (64.5\")   Wt 88.7 kg (195 lb 9.6 oz)   SpO2 98%   BMI 33.06 kg/m²            Physical Exam  Constitutional:       Comments: She looks thinner than previously seen, but appears in no acute distress and is relaxed.   HENT:      Head: Atraumatic.   Cardiovascular:      Rate and Rhythm: Normal rate and regular rhythm.      Pulses: Normal pulses.      Heart sounds: Normal heart sounds. No murmur heard.  Pulmonary:      Effort: Pulmonary effort is normal. No respiratory distress.      Breath sounds: Normal breath sounds. No wheezing or rales.   Musculoskeletal:      Cervical back: Normal range of motion.      Right lower leg: Edema present.      Left lower leg: Edema present.      Comments: She has bilateral trace pitting edema in the lower extremities including the pretibial surfaces.  There is no focal warmth or erythema.  No discoloration.  The popliteal fossa and the calf muscles are nontender.  She has good dorsalis pedis pulses.  There is no skin ulceration or " breakdown.        Result Review :   The following data was reviewed by: Mega Esparza MD on 04/25/2022:  Common labs    Common Labsle 8/14/21 10/25/21 10/25/21 1/24/22 1/24/22     1153 1153 1747 1747   Glucose 94 93   93   BUN 8 8   7 (A)   Creatinine 0.47 (A) 0.74   0.60   eGFR  Am >150 90   115   Sodium 131 (A) 140   140   Potassium 4.3 4.4   4.2   Chloride 102 106   105   Calcium 9.7 10.5   9.8   Albumin  3.50   3.50   Total Bilirubin  1.2   0.8   Alkaline Phosphatase  77   95   AST (SGOT)  14   30   ALT (SGPT)  8   13   WBC   3.60 2.35 (A)    Hemoglobin   13.3 14.3    Hematocrit   41.5 42.9    Platelets   147 123 (A)    (A) Abnormal value       Comments are available for some flowsheets but are not being displayed.                     Assessment and Plan    Diagnoses and all orders for this visit:    1. Edema, unspecified type (Primary)  -     CBC & Differential  -     Comprehensive Metabolic Panel  -     Urinalysis With Microscopic If Indicated (No Culture) - Urine, Clean Catch  -     TSH Rfx On Abnormal To Free T4  -     Hemoglobin A1c    2. Weight loss  -     CBC & Differential  -     Comprehensive Metabolic Panel  -     Urinalysis With Microscopic If Indicated (No Culture) - Urine, Clean Catch  -     TSH Rfx On Abnormal To Free T4  -     Hemoglobin A1c    3. Abnormal finding of blood chemistry, unspecified   -     Hemoglobin A1c      Dependent edema.  Only from activity and being out of bed now.  I recommend compression stockings.  I am doing the above lab work.  This is less likely a deep venous thrombosis.  And very likely not congestive heart failure or other abnormalities such as sequelae from portal hypertension.  She overall looks good.    Weight loss.  Likely due to diet and exercise changes.  Cannot completely rule out abnormal weight loss.  Getting above lab work.    If the swelling gets much worse, the patient and family understand to let me know and we will then do further work-up  including ultrasound.  Otherwise I will see her back in about 3 months for recheck.      Follow Up   No follow-ups on file.  Patient was given instructions and counseling regarding her condition or for health maintenance advice. Please see specific information pulled into the AVS if appropriate.

## 2022-04-26 LAB
ALBUMIN SERPL-MCNC: 3.9 G/DL (ref 3.6–4.6)
ALBUMIN/GLOB SERPL: 1 {RATIO} (ref 1.2–2.2)
ALP SERPL-CCNC: 93 IU/L (ref 44–121)
ALT SERPL-CCNC: 14 IU/L (ref 0–32)
APPEARANCE UR: ABNORMAL
AST SERPL-CCNC: 19 IU/L (ref 0–40)
BACTERIA #/AREA URNS HPF: ABNORMAL /[HPF]
BASOPHILS # BLD AUTO: 0 X10E3/UL (ref 0–0.2)
BASOPHILS NFR BLD AUTO: 1 %
BILIRUB SERPL-MCNC: 0.8 MG/DL (ref 0–1.2)
BILIRUB UR QL STRIP: NEGATIVE
BUN SERPL-MCNC: 16 MG/DL (ref 8–27)
BUN/CREAT SERPL: 18 (ref 12–28)
CALCIUM SERPL-MCNC: 10.8 MG/DL (ref 8.7–10.3)
CASTS URNS QL MICRO: ABNORMAL /LPF
CHLORIDE SERPL-SCNC: 104 MMOL/L (ref 96–106)
CO2 SERPL-SCNC: 22 MMOL/L (ref 20–29)
COLOR UR: YELLOW
CREAT SERPL-MCNC: 0.9 MG/DL (ref 0.57–1)
CRYSTALS URNS MICRO: ABNORMAL
EGFRCR SERPLBLD CKD-EPI 2021: 62 ML/MIN/1.73
EOSINOPHIL # BLD AUTO: 0.1 X10E3/UL (ref 0–0.4)
EOSINOPHIL NFR BLD AUTO: 1 %
EPI CELLS #/AREA URNS HPF: ABNORMAL /HPF (ref 0–10)
ERYTHROCYTE [DISTWIDTH] IN BLOOD BY AUTOMATED COUNT: 13 % (ref 11.7–15.4)
GLOBULIN SER CALC-MCNC: 4 G/DL (ref 1.5–4.5)
GLUCOSE SERPL-MCNC: 106 MG/DL (ref 65–99)
GLUCOSE UR QL STRIP: NEGATIVE
HBA1C MFR BLD: 5.4 % (ref 4.8–5.6)
HCT VFR BLD AUTO: 37.9 % (ref 34–46.6)
HGB BLD-MCNC: 12.3 G/DL (ref 11.1–15.9)
HGB UR QL STRIP: NEGATIVE
IMM GRANULOCYTES # BLD AUTO: 0 X10E3/UL (ref 0–0.1)
IMM GRANULOCYTES NFR BLD AUTO: 0 %
KETONES UR QL STRIP: ABNORMAL
LEUKOCYTE ESTERASE UR QL STRIP: ABNORMAL
LYMPHOCYTES # BLD AUTO: 1.7 X10E3/UL (ref 0.7–3.1)
LYMPHOCYTES NFR BLD AUTO: 40 %
MCH RBC QN AUTO: 31.7 PG (ref 26.6–33)
MCHC RBC AUTO-ENTMCNC: 32.5 G/DL (ref 31.5–35.7)
MCV RBC AUTO: 98 FL (ref 79–97)
MICRO URNS: ABNORMAL
MONOCYTES # BLD AUTO: 0.4 X10E3/UL (ref 0.1–0.9)
MONOCYTES NFR BLD AUTO: 9 %
NEUTROPHILS # BLD AUTO: 2.1 X10E3/UL (ref 1.4–7)
NEUTROPHILS NFR BLD AUTO: 49 %
NITRITE UR QL STRIP: POSITIVE
PH UR STRIP: 5.5 [PH] (ref 5–7.5)
PLATELET # BLD AUTO: 199 X10E3/UL (ref 150–450)
POTASSIUM SERPL-SCNC: 4.7 MMOL/L (ref 3.5–5.2)
PROT SERPL-MCNC: 7.9 G/DL (ref 6–8.5)
PROT UR QL STRIP: ABNORMAL
RBC # BLD AUTO: 3.88 X10E6/UL (ref 3.77–5.28)
RBC #/AREA URNS HPF: ABNORMAL /HPF (ref 0–2)
SODIUM SERPL-SCNC: 140 MMOL/L (ref 134–144)
SP GR UR STRIP: 1.02 (ref 1–1.03)
TSH SERPL DL<=0.005 MIU/L-ACNC: 0.85 UIU/ML (ref 0.45–4.5)
UNIDENT CRYS URNS QL MICRO: PRESENT
UROBILINOGEN UR STRIP-MCNC: 0.2 MG/DL (ref 0.2–1)
WBC # BLD AUTO: 4.3 X10E3/UL (ref 3.4–10.8)
WBC #/AREA URNS HPF: >30 /HPF (ref 0–5)

## 2022-04-28 RX ORDER — PANTOPRAZOLE SODIUM 40 MG/1
TABLET, DELAYED RELEASE ORAL
Qty: 180 TABLET | Refills: 1 | Status: SHIPPED | OUTPATIENT
Start: 2022-04-28 | End: 2023-01-27

## 2022-04-28 NOTE — TELEPHONE ENCOUNTER
Rx Refill Note  Requested Prescriptions     Pending Prescriptions Disp Refills   • pantoprazole (PROTONIX) 40 MG EC tablet [Pharmacy Med Name: PANTOPRAZOLE SODIUM 40 MG Tablet Delayed Release] 180 tablet 1     Sig: TAKE 1 TABLET TWICE DAILY      Last office visit with prescribing clinician: 4/25/2022      Next office visit with prescribing clinician: 7/18/2022            Gordon Monroy  04/28/22, 09:22 EDT

## 2022-05-09 PROCEDURE — 93296 REM INTERROG EVL PM/IDS: CPT | Performed by: INTERNAL MEDICINE

## 2022-05-09 PROCEDURE — 93294 REM INTERROG EVL PM/LDLS PM: CPT | Performed by: INTERNAL MEDICINE

## 2022-05-09 RX ORDER — FUROSEMIDE 20 MG/1
20 TABLET ORAL DAILY PRN
Qty: 30 TABLET | Refills: 0 | Status: SHIPPED | OUTPATIENT
Start: 2022-05-09 | End: 2022-08-01

## 2022-05-11 ENCOUNTER — TELEPHONE (OUTPATIENT)
Dept: FAMILY MEDICINE CLINIC | Facility: CLINIC | Age: 87
End: 2022-05-11

## 2022-05-11 ENCOUNTER — TELEMEDICINE (OUTPATIENT)
Dept: FAMILY MEDICINE CLINIC | Facility: CLINIC | Age: 87
End: 2022-05-11

## 2022-05-11 DIAGNOSIS — R60.0 EDEMA OF LEFT LOWER EXTREMITY: Primary | ICD-10-CM

## 2022-05-11 PROCEDURE — 99213 OFFICE O/P EST LOW 20 MIN: CPT | Performed by: FAMILY MEDICINE

## 2022-05-11 NOTE — PROGRESS NOTES
Chief Complaint  Edema    Subjective          Brittany Villeda presents to [facility]  History of Present Illness    You have chosen to receive care through a telehealth visit.  Do you consent to use a video/audio connection for your medical care today? Yes    Ongoing lower extremity edema left greater than right.  She was seen recently.  No dyspnea.  No orthopnea.  No chest pain.  She overall feels good.  She wakes up with the swelling is worse later in the day.  We started her on Lasix 20 mg a day recently.  She states the swelling is little better but still bothersome.  It is uncomfortable but not hot or tender to touch.  She is unable to use her compression stockings because they are too tight.  She is up out of bed much more than she used to be when she was bedbound the last couple of years.  The CMP revealed no liver enzyme abnormalities.  Her albumin and protein levels are normal.  Her kidney function was very adequate.  She otherwise feels well.  She is on no other medicine other than Lasix.  She has not taking amlodipine.  Her TSH was within normal limits.      Objective   Vital Signs:  There were no vitals taken for this visit.          Physical Exam  Constitutional:       Comments: Patient is well-known to me.  Overall she looks good.  Good affect.  No dyspnea.  Her lower extremities show edema but the lighting is not good.  No definitive erythema.        Result Review :                 Assessment and Plan    Diagnoses and all orders for this visit:    1. Edema of left lower extremity (Primary)  -     Duplex Venous Lower Extremity - Left CAR; Future    Lower extremity edema.  Suggestive of dependent edema.  Cannot completely rule out deep venous thrombosis of the left lower extremity.  Venous Doppler ultrasound ordered.  Holding off anticoagulation.  She has had 2 previous GI bleeds on anticoagulation, and also a intra cerebral hemorrhage.  If she does have a DVT, would  need consideration of a IVC filter.  At this time low probability of deep venous thrombosis.  No evidence of likely CHF, pulmonary embolism, or other cause of dependent edema.  I will see her back as scheduled.  Sooner as needed.    Duration of visit 15 minutes including coordinating video visit, interviewing patient and examining patient over the video, and recordkeeping.  Also ordered tests.         Follow Up   No follow-ups on file.  Patient was given instructions and counseling regarding her condition or for health maintenance advice. Please see specific information pulled into the AVS if appropriate.

## 2022-05-11 NOTE — TELEPHONE ENCOUNTER
Caller: Concepcion Diana    Relationship: Emergency Contact    Best call back number: [phone]     What is the best time to reach you: ANY     Who are you requesting to speak with (clinical staff, provider,  specific staff member): CLINICAL     Do you know the name of the person who called: CONCEPCION     What was the call regarding: WANTS TO TALK TO DR. RANDHAWA ASSISTANT ABOUT ULTRASOUND THAT WAS DISCUSSED TODAY 5/11/2022     Do you require a callback: YES

## 2022-05-13 ENCOUNTER — HOSPITAL ENCOUNTER (OUTPATIENT)
Dept: CARDIOLOGY | Facility: HOSPITAL | Age: 87
Discharge: HOME | End: 2022-05-13
Admitting: FAMILY MEDICINE

## 2022-05-13 DIAGNOSIS — R60.0 EDEMA OF LEFT LOWER EXTREMITY: ICD-10-CM

## 2022-05-13 LAB
BH CV LOWER VASCULAR LEFT COMMON FEMORAL AUGMENT: NORMAL
BH CV LOWER VASCULAR LEFT COMMON FEMORAL COMPETENT: NORMAL
BH CV LOWER VASCULAR LEFT COMMON FEMORAL COMPRESS: NORMAL
BH CV LOWER VASCULAR LEFT COMMON FEMORAL PHASIC: NORMAL
BH CV LOWER VASCULAR LEFT COMMON FEMORAL SPONT: NORMAL
BH CV LOWER VASCULAR LEFT DISTAL FEMORAL COMPRESS: NORMAL
BH CV LOWER VASCULAR LEFT GASTRONEMIUS COMPRESS: NORMAL
BH CV LOWER VASCULAR LEFT GREATER SAPH AK COMPRESS: NORMAL
BH CV LOWER VASCULAR LEFT GREATER SAPH BK COMPRESS: NORMAL
BH CV LOWER VASCULAR LEFT LESSER SAPH COMPRESS: NORMAL
BH CV LOWER VASCULAR LEFT MID FEMORAL AUGMENT: NORMAL
BH CV LOWER VASCULAR LEFT MID FEMORAL COMPETENT: NORMAL
BH CV LOWER VASCULAR LEFT MID FEMORAL COMPRESS: NORMAL
BH CV LOWER VASCULAR LEFT MID FEMORAL PHASIC: NORMAL
BH CV LOWER VASCULAR LEFT MID FEMORAL SPONT: NORMAL
BH CV LOWER VASCULAR LEFT PERONEAL COMPRESS: NORMAL
BH CV LOWER VASCULAR LEFT POPLITEAL AUGMENT: NORMAL
BH CV LOWER VASCULAR LEFT POPLITEAL COMPETENT: NORMAL
BH CV LOWER VASCULAR LEFT POPLITEAL COMPRESS: NORMAL
BH CV LOWER VASCULAR LEFT POPLITEAL PHASIC: NORMAL
BH CV LOWER VASCULAR LEFT POPLITEAL SPONT: NORMAL
BH CV LOWER VASCULAR LEFT POSTERIOR TIBIAL COMPRESS: NORMAL
BH CV LOWER VASCULAR LEFT PROFUNDA FEMORAL COMPRESS: NORMAL
BH CV LOWER VASCULAR LEFT PROXIMAL FEMORAL COMPRESS: NORMAL
BH CV LOWER VASCULAR LEFT SAPHENOFEMORAL JUNCTION COMPRESS: NORMAL
BH CV LOWER VASCULAR RIGHT COMMON FEMORAL AUGMENT: NORMAL
BH CV LOWER VASCULAR RIGHT COMMON FEMORAL COMPETENT: NORMAL
BH CV LOWER VASCULAR RIGHT COMMON FEMORAL COMPRESS: NORMAL
BH CV LOWER VASCULAR RIGHT COMMON FEMORAL PHASIC: NORMAL
BH CV LOWER VASCULAR RIGHT COMMON FEMORAL SPONT: NORMAL
BH CV POP FLUID COLLECT LEFT: 1
MAXIMAL PREDICTED HEART RATE: 133 BPM
STRESS TARGET HR: 113 BPM

## 2022-05-13 PROCEDURE — 93971 EXTREMITY STUDY: CPT

## 2022-05-23 ENCOUNTER — HOSPITAL ENCOUNTER (EMERGENCY)
Facility: HOSPITAL | Age: 87
Discharge: HOME | End: 2022-05-23
Attending: EMERGENCY MEDICINE | Admitting: EMERGENCY MEDICINE

## 2022-05-23 VITALS
SYSTOLIC BLOOD PRESSURE: 146 MMHG | HEART RATE: 70 BPM | OXYGEN SATURATION: 99 % | DIASTOLIC BLOOD PRESSURE: 87 MMHG | RESPIRATION RATE: 18 BRPM | TEMPERATURE: 97 F

## 2022-05-23 DIAGNOSIS — T54.91XA INGESTION OF BLEACH, ACCIDENTAL OR UNINTENTIONAL, INITIAL ENCOUNTER: Primary | ICD-10-CM

## 2022-05-23 DIAGNOSIS — J02.9 SORE THROAT: ICD-10-CM

## 2022-05-23 PROCEDURE — 99283 EMERGENCY DEPT VISIT LOW MDM: CPT

## 2022-05-23 NOTE — ED PROVIDER NOTES
MD ATTESTATION NOTE    The BRENT and I have discussed this patient's history, physical exam, and treatment plan.  I have reviewed the documentation and personally had a face to face interaction with the patient. I affirm the documentation and agree with the treatment and plan.  The attached note describes my personal findings.      I provided a substantive portion of the care of the patient.  I personally performed the physical exam in its entirety, and below are my findings.  For this patient encounter, the patient wore surgical mask, I wore full protective PPE including N95 and eye protection.      Brief HPI: Patient complains of a burning sensation in her throat.  She took her morning medications around 10:45 AM.  She reports that the cup she drank out of had previously had bleach in it.  She states that her daughter washed it out before filling it with water and giving it to the patient.  Her symptoms began shortly after she drank water from this cup.  Her symptoms have currently improved.  She denies shortness of breath.    PHYSICAL EXAM  ED Triage Vitals   Temp Heart Rate Resp BP SpO2   05/23/22 1247 05/23/22 1246 05/23/22 1246 05/23/22 1246 05/23/22 1246   97 °F (36.1 °C) 70 18 146/87 99 %      Temp src Heart Rate Source Patient Position BP Location FiO2 (%)   -- -- -- -- --                GENERAL: Awake and alert.  Elderly but nontoxic-appearing female.  Resting comfortably in no acute distress  HENT: nares patent, oropharynx is benign, patient is swallowing secretions without difficulty  EYES: no scleral icterus  CV: regular rhythm, normal rate  RESPIRATORY: normal effort  ABDOMEN: soft  MUSCULOSKELETAL: no deformity  NEURO: alert, moves all extremities, follows commands  PSYCH:  calm, cooperative  SKIN: warm, dry    Vital signs and nursing notes reviewed.        Plan: Poison control was contacted by CORRINE Gonzales.  They recommended observing the patient given her cool liquids to drink.  Patient has been able  to drink here in the ED without difficulty.  She will be discharged.     Genaro Owen MD  05/23/22 3641

## 2022-05-23 NOTE — ED TRIAGE NOTES
Patient took her medications this morning, and states that she used a cup that had had bleach in it before and it had not been washed out. She only drank enough to swallow her pills, her throat is now burning. Patient states she feels like she can not drink or eat anything due to her throat burning. Patient is able to swallow secretions and also is breathing normally.    Patient has on mask nurse has on proper ppe.

## 2022-05-23 NOTE — ED NOTES
This RN spoke with Cecilio from poison controlled recommendation is watching patient, cool liquids. APRN notified

## 2022-05-23 NOTE — DISCHARGE INSTRUCTIONS
Soft diet  Avoid spicy and acidic foods, carbonated beverages as they will likely hurt to swallow  Return if worse or new concerns   Continue care with your primary care physician and have your blood pressure regularly checked and managed. Normal blood pressure is 120/80.

## 2022-05-23 NOTE — ED PROVIDER NOTES
EMERGENCY DEPARTMENT ENCOUNTER    Room Number:  B01/01  Date of encounter:  5/23/2022  PCP: Mega Esparza MD  Historian: patient, daughter   Full history not obtainable due to: None    HPI:  Chief Complaint: Sore throat , Ingestion of bleach     Context: Brittany Villeda is a 87 y.o. female who presents to the ED c/o ingestion of bleach onset this am. Reports she accidentally grabbed a cup she thought was filled with water to take her medications but it actually contained bleach. She only took a sip large enough to swallow some pills.   She started with a sore throat immediately after. She then tried to drink some water and could not get the water down due to the burning pain. It has been constant since onset. Moderate to severe at times. Worsened with swallowing or attempting to take in po. No vomiting. No cp. No soa. 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   • Leukopenia 09/12/2016   • MGUS (monoclonal gammopathy of unknown significance) 09/16/2016   • 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[facility]) 11/30/2017   • Other chest pain 12/24/2017   • Lichen sclerosus 08/23/2017   • Heart block 10/30/2018   • Sleep-related hypoxia 12/22/2018   • Bradycardia 12/29/2018   • Shoulder arthritis 01/28/2019   • History of atrial fibrillation 03/19/2019   • Pacemaker 03/19/2019   • Paroxysmal SVT (supraventricular tachycardia) ([facility]) 05/08/2019   • History of chest pain 05/08/2019   • Palpitations  05/08/2019   • Atrial fibrillation ([facility]) 10/06/2019   • Rectal bleeding 10/15/2019   • Arthritis 12/13/2019   • Ascending aortic aneurysm ([facility]) 08/24/2020   • Mediastinal lymphadenopathy 09/09/2020   • Immobility 11/20/2020   • Left knee pain 11/20/2020   • Hemarthrosis of left knee 11/20/2020   • Anemia    • Obesity (BMI 30-39.9)    • Generalized weakness 04/08/2021   • Dysautonomia ([facility]) 04/09/2021   • Weakness of both lower extremities 04/09/2021   • Macrocytosis 04/11/2021   • Hypercalcemia 04/11/2021   • Arthritis of right wrist 04/13/2021   • Hyperparathyroidism ([facility]) 04/28/2021   • Choledocholithiasis 05/09/2021   • Essential hypertension 05/10/2021   • Hyperglycemia 05/10/2021   • Epigastric pain 05/12/2021   • Duodenal ulcer 05/13/2021   • Hemorrhagic gastritis 05/13/2021   • Exertional dyspnea 06/22/2021   • COPD (chronic obstructive pulmonary disease) ([facility])    • Functional quadriplegia ([facility]) 11/20/2020   • Hip pain 04/12/2018   • Osteoarthritis of glenohumeral joint 07/12/2018   • Other malaise and fatigue 05/25/2021   • Shoulder pain 04/12/2018   • ICH (intracerebral hemorrhage) ([facility]) 08/05/2021     Resolved Ambulatory Problems     Diagnosis Date Noted   • Abnormal weight loss 05/12/2016   • Tachycardia 05/12/2016   • Nausea and vomiting 05/12/2016   • Hyperthyroidism 05/12/2016   • Fatigue 05/12/2016   • Dizziness 05/12/2016   • Diarrhea 05/12/2016   • Abnormal thyroid stimulating hormone (TSH) level 05/12/2016   • Abdominal pain 05/12/2016   • Abnormal EKG 06/30/2016   • Chronic anticoagulation 11/20/2020   • Acute UTI (urinary tract infection) 04/08/2021   • Spondylosis of lumbosacral region without myelopathy or radiculopathy 04/09/2021   • Hypomagnesemia 04/13/2021   • Subdural hematoma ([facility]) 08/05/2021     Past Medical History:   Diagnosis Date   • Arrhythmia    • Asthma    • Chest pain    • Colitis    • GERD (gastroesophageal reflux disease)    • Hypertension    • Hypertensive heart disease    •  Kidney stone    • Low back pain    • Nephrolithiasis    • Obesity    • Paroxysmal atrial fibrillation (HCC)    • Peptic ulceration    • Persistent atrial fibrillation (HCC)    • PSVT (paroxysmal supraventricular tachycardia) (HCC)    • Rectal bleed    • Sleep apnea    • Systemic hypertension    • Ventricular tachycardia (HCC)    • Vertigo          PAST SURGICAL HISTORY  Past Surgical History:   Procedure Laterality Date   • BACK SURGERY      lumbar fusion   • DUSTY HOLE Left 8/8/2021    Procedure: Left-sided dusty holes for evacuation of subdural hematoma;  Surgeon: Gustavo Callaway MD;  Location: [facility] MAIN OR;  Service: Neurosurgery;  Laterality: Left;   • CARDIAC ELECTROPHYSIOLOGY PROCEDURE N/A 11/7/2018    Procedure: Pacemaker DC new   [place];  Surgeon: Jesus Granda MD;  Location: [facility] CATH INVASIVE LOCATION;  Service: Cardiology   • CHOLECYSTECTOMY     • COLONOSCOPY  06/16/2014    colitis, cryptitis,  tics, NBIH, TA w/low grade dysplasia   • COLONOSCOPY N/A 10/28/2019    Procedure: COLONOSCOPY WITH COLD AND HOT POLYPECTOMIES;  Surgeon: Micaela English MD;  Location: [facility] ENDOSCOPY;  Service: Gastroenterology   • ENDOSCOPY N/A 5/13/2021    Procedure: ESOPHAGOGASTRODUODENOSCOPY with BX;  Surgeon: Stefan Mcfadden MD;  Location: [facility] ENDOSCOPY;  Service: Gastroenterology;  Laterality: N/A;  EPIGASTRIC PAIN  --DUODENAL ULCER, HEMORRHAGIC GASTRITIS, HIATAL HERNIA    • HEMORRHOIDECTOMY     • HYSTERECTOMY     • JOINT REPLACEMENT     • KNEE ARTHROPLASTY     • MYOMECTOMY     • PACEMAKER IMPLANTATION     • SHOULDER SURGERY     • SINUS SURGERY     • TOE NAIL AMPUTATION  03/04/2019   • TONSILLECTOMY           FAMILY HISTORY  Family History   Problem Relation Age of Onset   • Diabetes Mother    • Breast cancer Sister    • Kidney cancer Sister    • Heart disease Sister    • Prostate cancer Brother    • Prostate cancer Brother    • Prostate cancer Brother    • Malig Hyperthermia Neg Hx          SOCIAL  HISTORY  Social History     Socioeconomic History   • Marital status:    • Number of children: 10   • Years of education: High School   Tobacco Use   • Smoking status: Former Smoker     Packs/day: 1.50     Years: 10.00     Pack years: 15.00     Start date:      Quit date:      Years since quittin.4   • Smokeless tobacco: Never Used   • Tobacco comment: QUIT SMOKING    Vaping Use   • Vaping Use: Never used   Substance and Sexual Activity   • Alcohol use: No   • Drug use: Defer   • Sexual activity: Defer         ALLERGIES  Codeine, Amitriptyline, Amoxicillin-pot clavulanate, Aspirin, Bactrim [sulfamethoxazole-trimethoprim], Carisoprodol-aspirin-codeine, Iodinated diagnostic agents, Latex, Naproxen, Nsaids, Soma compound with codeine [carisoprodol-aspirin-codeine], Sulfa antibiotics, and Tramadol        REVIEW OF SYSTEMS  Review of Systems   All systems reviewed and marked as negative except as listed in HPI       PHYSICAL EXAM    I have reviewed the triage vital signs and nursing notes.    ED Triage Vitals   Temp Heart Rate Resp BP SpO2   22 1247 22 1246 22 1246 22 1246 22 1246   97 °F (36.1 °C) 70 18 146/87 99 %      Temp src Heart Rate Source Patient Position BP Location FiO2 (%)   -- -- -- -- --              GENERAL: alert well developed, well nourished in no distress  HENT: NCAT, neck supple, trachea midline. Op is clear. There is mild injection of the POP. No stridor. No trismus. No dysphonia.   EYES: no scleral icterus, PERRL, normal conjunctivae  CV: regular rhythm, regular rate, no murmur  RESPIRATORY: unlabored effort, CTAB  ABDOMEN: soft, nontender, nondistended, bowel sounds present  MUSCULOSKELETAL: no gross deformity  NEURO: alert,  sensory and motor function of extremities grossly intact, speech clear, mental status normal/baseline  SKIN: warm, dry, no rash  PSYCH:  Appropriate mood and affect    Vital signs and nursing notes reviewed.          LAB  RESULTS  No results found for this or any previous visit (from the past 24 hour(s)).    Ordered the above labs and independently reviewed the results.        RADIOLOGY  No Radiology Exams Resulted Within Past 24 Hours    I ordered the above noted radiological studies. Independently reviewed by me and discussed with radiologist.  See dictation above for official radiology interpretation.      PROCEDURES    Procedures        MEDICATIONS GIVEN IN ER    Medications - No data to display      PROGRESS, DATA ANALYSIS, CONSULTS, AND MEDICAL DECISION MAKING    All labs have been independently reviewed by me.  All radiology studies have been reviewed by me.   EKG's independently reviewed by me.  Discussion below represents my analysis of pertinent findings related to patient's condition, differential diagnosis, treatment plan and final disposition.    DIFFERENTIAL DIAGNOSIS INCLUDE BUT NOT LIMITED TO: retained foreign body, bleach poisoning, esophagitis, GERD, gastritis     ED Course as of 05/23/22 1525   Mon May 23, 2022   Naga Gonzales, pt nurse spoke with poison control. The recommend no treatment or interventions. They do not expect any negative sequela from the ingestion  [JS]   1525 Po challenging. Doing well. Requesting to be discharged. Appears stable.  [JS]      ED Course User Index  [JS] Brooke Arnold APRN       AS OF 15:25 EDT VITALS:        BP - 146/87  HR - 70  TEMP - 97 °F (36.1 °C)  O2 SATS - 99%          DIAGNOSIS  Final diagnoses:   Ingestion of bleach, accidental or unintentional, initial encounter   Sore throat         DISPOSITION  Discharge     Pt masked in first look. I wore appropriate PPE throughout my encounters with the pt. I performed hand hygiene on entry into the pt room and upon exit.     Dictated utilizing Dragon dictation:  Much of this encounter note is an electronic transcription/translation of spoken language to printed text.      Brooke Arnold APRN  05/23/22 1525

## 2022-06-27 ENCOUNTER — TELEPHONE (OUTPATIENT)
Dept: GASTROENTEROLOGY | Facility: CLINIC | Age: 87
End: 2022-06-27

## 2022-06-27 NOTE — TELEPHONE ENCOUNTER
Caller: Brittany Villeda    Relationship: Self    Best call back number: [phone]    What is the best time to reach you: ASAP OR 06.28.22 AFTER 1PM     Who are you requesting to speak with (clinical staff, provider,  specific staff member): DR. VANESSA MOSCOSO    Do you know the name of the person who called: CONCEPCION SOSA-PTS DAUGHTER    What was the call regarding: CONCERN ABOUT BLOOD IN HER MOTHER'S STOOL    Do you require a callback: YES

## 2022-06-27 NOTE — TELEPHONE ENCOUNTER
Returned pts daughters call.    She stated pt has been having bright red blood intermittently from her rectum for the last 3 weeks.  She said she told Dr Esparza and he felt this to be her hemorrhoids.  dtr stated she is not sure if it is Hemorrhoids or not.  She hasn't been seen in a while, so I have scheduled them for further evaluation tomorrow.

## 2022-06-28 ENCOUNTER — OFFICE VISIT (OUTPATIENT)
Dept: GASTROENTEROLOGY | Facility: CLINIC | Age: 87
End: 2022-06-28

## 2022-06-28 VITALS
HEIGHT: 64 IN | DIASTOLIC BLOOD PRESSURE: 83 MMHG | HEART RATE: 70 BPM | SYSTOLIC BLOOD PRESSURE: 129 MMHG | WEIGHT: 197.8 LBS | BODY MASS INDEX: 33.77 KG/M2 | TEMPERATURE: 96.4 F

## 2022-06-28 DIAGNOSIS — K62.5 RECTAL BLEEDING: Primary | ICD-10-CM

## 2022-06-28 DIAGNOSIS — K51.30 ULCERATIVE RECTOSIGMOIDITIS WITHOUT COMPLICATION: ICD-10-CM

## 2022-06-28 PROCEDURE — 99214 OFFICE O/P EST MOD 30 MIN: CPT | Performed by: PHYSICIAN ASSISTANT

## 2022-06-28 RX ORDER — MESALAMINE 1000 MG/1
1000 SUPPOSITORY RECTAL NIGHTLY
Qty: 30 SUPPOSITORY | Refills: 0 | Status: SHIPPED | OUTPATIENT
Start: 2022-06-28 | End: 2022-07-18

## 2022-06-28 NOTE — PROGRESS NOTES
Chief Complaint  Rectal Bleeding and Abdominal Pain    Subjective        History of Present Illness  Brittany Villeda is a  87 y.o. female accompanied by her daughter Amina Diana here for complaints of abdominal pain and rectal bleeding.  She is a patient of Dr. Englihs, new to me today.  Past medical history is significant for rectosigmoid ulcerative colitis. She also has a history of duodenal ulcer  5/2021.     She presents today with cramping lower abdominal pain relieved with defecation, blood and mucus per rectum for the last three weeks. She has been taking metamucil and colace and reports her bowels move regularly and without difficulty. She denies nausea, vomiting, fever, chills, heartburn/reflux, dysphagia, or melena.    She is currently taking 4 Apriso tablets daily.     Last colonoscopy completed 10/28/2019: Proctosigmoid ulcerative colitis, sigmoid diverticulosis, nonbleeding internal hemorrhoids, tubular adenoma x2, hyperplastic polyp.  Screening colonoscopy deferred secondary to patient's age.    Past Medical History:   Diagnosis Date   • Acute UTI (urinary tract infection) 4/8/2021   • Anemia    • Arrhythmia    • Arthritis    • Asthma    • Bradycardia    • Chest pain    • Choledocholithiasis 5/9/2021   • Colitis    • COPD (chronic obstructive pulmonary disease) (HCC)    • Diastolic dysfunction    • Essential hypertension 5/12/2016   • GERD (gastroesophageal reflux disease)    • Heart block    • Hypertension    • Hypertensive heart disease    • Hyperthyroidism    • Kidney stone    • Leukopenia    • Low back pain    • MGUS (monoclonal gammopathy of unknown significance)    • Nephrolithiasis    • Obesity    • Paroxysmal atrial fibrillation (HCC)    • Peptic ulceration    • Persistent atrial fibrillation (HCC)    • PSVT (paroxysmal supraventricular tachycardia) (HCC)    • Pulmonary hypertension (HCC)    • Rectal bleed    • Sleep apnea    • Systemic hypertension    • Trifascicular block    •  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HCC)    • Ventricular tachycardia (HCC)     nonsustained   • Vertigo        Past Surgical History:   Procedure Laterality Date   • BACK SURGERY      lumbar fusion   • DUSTY HOLE Left 8/8/2021    Procedure: Left-sided dusty holes for evacuation of subdural hematoma;  Surgeon: Gustavo Callaway MD;  Location: [facility] MAIN OR;  Service: Neurosurgery;  Laterality: Left;   • CARDIAC ELECTROPHYSIOLOGY PROCEDURE N/A 11/7/2018    Procedure: Pacemaker DC new   [place];  Surgeon: Jesus Granda MD;  Location: [facility] CATH INVASIVE LOCATION;  Service: Cardiology   • CHOLECYSTECTOMY     • COLONOSCOPY  06/16/2014    colitis, cryptitis,  tics, NBIH, TA w/low grade dysplasia   • COLONOSCOPY N/A 10/28/2019    Procedure: COLONOSCOPY WITH COLD AND HOT POLYPECTOMIES;  Surgeon: Micaela English MD;  Location: [facility] ENDOSCOPY;  Service: Gastroenterology   • ENDOSCOPY N/A 5/13/2021    Procedure: ESOPHAGOGASTRODUODENOSCOPY with BX;  Surgeon: Stefan Mcfadden MD;  Location: [facility] ENDOSCOPY;  Service: Gastroenterology;  Laterality: N/A;  EPIGASTRIC PAIN  --DUODENAL ULCER, HEMORRHAGIC GASTRITIS, HIATAL HERNIA    • HEMORRHOIDECTOMY     • HYSTERECTOMY     • JOINT REPLACEMENT     • KNEE ARTHROPLASTY     • MYOMECTOMY     • PACEMAKER IMPLANTATION     • SHOULDER SURGERY     • SINUS SURGERY     • TOE NAIL AMPUTATION  03/04/2019   • TONSILLECTOMY         Family History   Problem Relation Age of Onset   • Diabetes Mother    • Breast cancer Sister    • Kidney cancer Sister    • Heart disease Sister    • Prostate cancer Brother    • Prostate cancer Brother    • Prostate cancer Brother    • Malig Hyperthermia Neg Hx        Social History     Socioeconomic History   • Marital status:    • Number of children: 10   • Years of education: High School   Tobacco Use   • Smoking status: Former Smoker     Packs/day: 1.50     Years: 10.00     Pack years: 15.00     Start date: 1953     Quit date:  1965     Years since quittin.5   • Smokeless tobacco: Never Used   • Tobacco comment: QUIT SMOKING    Vaping Use   • Vaping Use: Never used   Substance and Sexual Activity   • Alcohol use: No   • Drug use: Defer   • Sexual activity: Defer       Allergies   Allergen Reactions   • Codeine Hallucinations     Tolerates hydromorphone   • Amitriptyline Rash   • Amoxicillin-Pot Clavulanate Rash   • Aspirin Unknown - Low Severity     Patient doesn't know why   • Bactrim [Sulfamethoxazole-Trimethoprim] Rash   • Carisoprodol-Aspirin-Codeine Palpitations   • Iodinated Diagnostic Agents Rash   • Latex Rash   • Naproxen Rash   • Nsaids Unknown - Low Severity     unknown   • Soma Compound With Codeine [Carisoprodol-Aspirin-Codeine] Rash   • Sulfa Antibiotics Rash   • Tramadol Palpitations     heart races        Current Outpatient Medications on File Prior to Visit   Medication Sig Dispense Refill   • acetaminophen (TYLENOL) 500 MG tablet Take 1 tablet by mouth Every 6 (Six) Hours As Needed for Mild Pain .     • albuterol sulfate  (90 Base) MCG/ACT inhaler Inhale 2 puffs Every 4 (Four) Hours As Needed for Wheezing (or cough). 18 g 0   • docusate sodium (COLACE) 100 MG capsule Take 100 mg by mouth Every Night.     • ELDERBERRY PO Take 2 tablets by mouth Daily.     • fexofenadine (ALLEGRA) 180 MG tablet Take 180 mg by mouth As Needed.     • furosemide (Lasix) 20 MG tablet Take 1 tablet by mouth Daily As Needed (ankle swelling). 30 tablet 0   • magnesium oxide (MAGOX) 400 (241.3 Mg) MG tablet tablet TAKE 1 TABLET EVERY DAY 90 tablet 1   • mesalamine (APRISO) 0.375 g 24 hr capsule Take 4 capsules by mouth Daily. 360 capsule 3   • pantoprazole (PROTONIX) 40 MG EC tablet TAKE 1 TABLET TWICE DAILY 180 tablet 1   • vitamin B-12 (CYANOCOBALAMIN) 1000 MCG tablet Take 1,000 mcg by mouth Daily.       No current facility-administered medications on file prior to visit.       Review of Systems   Constitutional: Negative for  "chills, fever and unexpected weight loss.   HENT: Negative for trouble swallowing.    Respiratory: Negative for cough and shortness of breath.    Cardiovascular: Negative for chest pain and palpitations.   Gastrointestinal: Positive for abdominal pain and blood in stool. Negative for constipation, diarrhea, nausea, vomiting and GERD.        Objective   Vital Signs:   /83   Pulse 70   Temp 96.4 °F (35.8 °C)   Ht 162.6 cm (64\")   Wt 89.7 kg (197 lb 12.8 oz)   BMI 33.95 kg/m²       Physical Exam  Vitals and nursing note reviewed.   Constitutional:       General: She is not in acute distress.     Appearance: Normal appearance. She is obese. She is ill-appearing (Chronically).   HENT:      Head: Normocephalic and atraumatic.      Right Ear: External ear normal.      Left Ear: External ear normal.   Eyes:      General: No scleral icterus.     Conjunctiva/sclera: Conjunctivae normal.      Pupils: Pupils are equal, round, and reactive to light.   Cardiovascular:      Rate and Rhythm: Normal rate and regular rhythm.      Heart sounds: Normal heart sounds.   Pulmonary:      Effort: Pulmonary effort is normal.      Breath sounds: Normal breath sounds.   Abdominal:      General: Abdomen is flat. Bowel sounds are normal. There is no distension.      Palpations: Abdomen is soft.      Tenderness: There is no abdominal tenderness. There is no guarding or rebound.   Musculoskeletal:      Cervical back: Normal range of motion and neck supple.   Skin:     General: Skin is warm and dry.   Neurological:      Mental Status: She is alert and oriented to person, place, and time.      Gait: Gait abnormal (Unsteady, uses walker to ambulate).   Psychiatric:         Mood and Affect: Mood normal.         Behavior: Behavior normal.          Result Review :       Common labs    Common Labsle 10/25/21 10/25/21 1/24/22 1/24/22 4/25/22 4/25/22 4/25/22    1153 1153 1747 1747 1641 1641 1641   Glucose 93   93  106 (A)    BUN 8   7 (A)  16  "   Creatinine 0.74   0.60  0.90    eGFR  Am 90   115      Sodium 140   140  140    Potassium 4.4   4.2  4.7    Chloride 106   105  104    Calcium 10.5   9.8  10.8 (A)    Total Protein      7.9    Albumin 3.50   3.50  3.9    Total Bilirubin 1.2   0.8  0.8    Alkaline Phosphatase 77   95  93    AST (SGOT) 14   30  19    ALT (SGPT) 8   13  14    WBC  3.60 2.35 (A)  4.3     Hemoglobin  13.3 14.3  12.3     Hematocrit  41.5 42.9  37.9     Platelets  147 123 (A)  199     Hemoglobin A1C       5.4   (A) Abnormal value       Comments are available for some flowsheets but are not being displayed.                               Assessment and Plan    Diagnoses and all orders for this visit:    1. Rectal bleeding (Primary)  -     CBC & Differential  -     C-reactive Protein; Future  -     Sedimentation Rate; Future  -     Comprehensive Metabolic Panel  -     Calprotectin, Fecal - Stool, Per Rectum; Future  -     Clostridium Difficile EIA - Stool, Per Rectum; Future  -     Stool Culture (Reference Lab) - Stool, Per Rectum; Future    2. 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HCC)  -     CBC & Differential  -     C-reactive Protein; Future  -     Sedimentation Rate; Future  -     Comprehensive Metabolic Panel  -     Calprotectin, Fecal - Stool, Per Rectum; Future  -     Clostridium Difficile EIA - Stool, Per Rectum; Future  -     Stool Culture (Reference Lab) - Stool, Per Rectum; Future    Other orders  -     mesalamine (CANASA) 1000 MG suppository; Insert 1 suppository into the rectum Every Night for 42 days.  Dispense: 30 suppository; Refill: 0      · Obtain stool studies, CBC, CMP, inflammatory markers  · Continue Apriso.  · Begin Canasa suppositories.  · Follow-up in 3 weeks, sooner if necessary.      Follow Up   Return in about 3 weeks (around 7/19/2022).    Dragon dictation used throughout this note.     BELLO Mclaughlin

## 2022-06-29 ENCOUNTER — TELEPHONE (OUTPATIENT)
Dept: GASTROENTEROLOGY | Facility: CLINIC | Age: 87
End: 2022-06-29

## 2022-06-29 LAB
ALBUMIN SERPL-MCNC: 4 G/DL (ref 3.6–4.6)
ALBUMIN/GLOB SERPL: 1.1 {RATIO} (ref 1.2–2.2)
ALP SERPL-CCNC: 88 IU/L (ref 44–121)
ALT SERPL-CCNC: 5 IU/L (ref 0–32)
AST SERPL-CCNC: 16 IU/L (ref 0–40)
BASOPHILS # BLD AUTO: 0 X10E3/UL (ref 0–0.2)
BASOPHILS NFR BLD AUTO: 1 %
BILIRUB SERPL-MCNC: 1.3 MG/DL (ref 0–1.2)
BUN SERPL-MCNC: 15 MG/DL (ref 8–27)
BUN/CREAT SERPL: 18 (ref 12–28)
CALCIUM SERPL-MCNC: 10.2 MG/DL (ref 8.7–10.3)
CHLORIDE SERPL-SCNC: 106 MMOL/L (ref 96–106)
CO2 SERPL-SCNC: 24 MMOL/L (ref 20–29)
CREAT SERPL-MCNC: 0.85 MG/DL (ref 0.57–1)
EGFRCR SERPLBLD CKD-EPI 2021: 66 ML/MIN/1.73
EOSINOPHIL # BLD AUTO: 0.1 X10E3/UL (ref 0–0.4)
EOSINOPHIL NFR BLD AUTO: 2 %
ERYTHROCYTE [DISTWIDTH] IN BLOOD BY AUTOMATED COUNT: 14.1 % (ref 11.7–15.4)
ERYTHROCYTE [SEDIMENTATION RATE] IN BLOOD BY WESTERGREN METHOD: 46 MM/HR (ref 0–40)
GLOBULIN SER CALC-MCNC: 3.8 G/DL (ref 1.5–4.5)
GLUCOSE SERPL-MCNC: 98 MG/DL (ref 65–99)
HCT VFR BLD AUTO: 37.5 % (ref 34–46.6)
HGB BLD-MCNC: 12.2 G/DL (ref 11.1–15.9)
IMM GRANULOCYTES # BLD AUTO: 0 X10E3/UL (ref 0–0.1)
IMM GRANULOCYTES NFR BLD AUTO: 0 %
LYMPHOCYTES # BLD AUTO: 2.5 X10E3/UL (ref 0.7–3.1)
LYMPHOCYTES NFR BLD AUTO: 61 %
Lab: NORMAL
MCH RBC QN AUTO: 30.7 PG (ref 26.6–33)
MCHC RBC AUTO-ENTMCNC: 32.5 G/DL (ref 31.5–35.7)
MCV RBC AUTO: 94 FL (ref 79–97)
MONOCYTES # BLD AUTO: 0.4 X10E3/UL (ref 0.1–0.9)
MONOCYTES NFR BLD AUTO: 11 %
MORPHOLOGY BLD-IMP: ABNORMAL
NEUTROPHILS # BLD AUTO: 1 X10E3/UL (ref 1.4–7)
NEUTROPHILS NFR BLD AUTO: 25 %
PLATELET # BLD AUTO: 132 X10E3/UL (ref 150–450)
POTASSIUM SERPL-SCNC: 4.5 MMOL/L (ref 3.5–5.2)
PROT SERPL-MCNC: 7.8 G/DL (ref 6–8.5)
RBC # BLD AUTO: 3.98 X10E6/UL (ref 3.77–5.28)
SODIUM SERPL-SCNC: 144 MMOL/L (ref 134–144)
SPECIMEN STATUS: NORMAL
WBC # BLD AUTO: 4.1 X10E3/UL (ref 3.4–10.8)

## 2022-06-29 NOTE — TELEPHONE ENCOUNTER
Prior authorization for mesalamine has been submitted via [facility].Prior authorization has been approved. Pharmacy has been called and left a message.

## 2022-07-01 LAB
C DIFF TOX A+B STL QL IA: NEGATIVE
CRP SERPL-MCNC: 9 MG/L (ref 0–10)
SPECIMEN STATUS: NORMAL

## 2022-07-05 LAB
BACTERIA SPEC CULT: NORMAL
BACTERIA SPEC CULT: NORMAL
CALPROTECTIN STL-MCNT: 118 UG/G (ref 0–120)
CAMPYLOBACTER STL CULT: NORMAL
E COLI SXT STL QL IA: NEGATIVE
SALM + SHIG STL CULT: NORMAL

## 2022-07-17 NOTE — PROGRESS NOTES
Chief Complaint  Ulcerative Rectosigmoiditis and Rectal Bleeding    Subjective        History of Present Illness  Brittany Villeda is a  87 y.o. female patient of Dr. English here for follow up for abdominal pain and rectal bleeding.  She is accompanied to today's visit by her daughter Amina.  Past medical history is significant for rectosigmoid ulcerative colitis. She also has a history of duodenal ulcer  5/2021.    She is maintained on Apriso and was placed on Canasa at last visit. Stool culture and cdiff were negative. Elevated sed rate, normal CRP and normal calprotectin. Her CMP did reveal an elevated total bilirubin at 1.3. Her hemoglobin is stable but platelets low at 132k.     She is no longer having any lower abdominal pain or rectal bleeding.  She has remained on the Canasa suppositories today.  Her bowels are moving well and without difficulty; no constipation or diarrhea..    Past Medical History:   Diagnosis Date   • Acute UTI (urinary tract infection) 4/8/2021   • Anemia    • Arrhythmia    • Arthritis    • Asthma    • Bradycardia    • Chest pain    • Choledocholithiasis 5/9/2021   • Colitis    • COPD (chronic obstructive pulmonary disease) ([facility])    • Diastolic dysfunction    • Essential hypertension 5/12/2016   • GERD (gastroesophageal reflux disease)    • Heart block    • Hypertension    • Hypertensive heart disease    • Hyperthyroidism    • Kidney stone    • Leukopenia    • Low back pain    • MGUS (monoclonal gammopathy of unknown significance)    • Nephrolithiasis    • Obesity    • Paroxysmal atrial fibrillation (HCC)    • Peptic ulceration    • Persistent atrial fibrillation (HCC)    • PSVT (paroxysmal supraventricular tachycardia) ([facility])    • Pulmonary hypertension (HCC)    • Rectal bleed    • Sleep apnea    • Systemic hypertension    • Trifascicular block    • 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[facility])    • Ventricular tachycardia ([facility])     nonsustained   • Vertigo        Past  Surgical History:   Procedure Laterality Date   • BACK SURGERY      lumbar fusion   • DUSTY HOLE Left 2021    Procedure: Left-sided dusyt holes for evacuation of subdural hematoma;  Surgeon: Gustavo Callaway MD;  Location: [facility] MAIN OR;  Service: Neurosurgery;  Laterality: Left;   • CARDIAC ELECTROPHYSIOLOGY PROCEDURE N/A 2018    Procedure: Pacemaker DC new   [place];  Surgeon: Jesus Granda MD;  Location: [facility] CATH INVASIVE LOCATION;  Service: Cardiology   • CHOLECYSTECTOMY     • COLONOSCOPY  2014    colitis, cryptitis,  tics, NBIH, TA w/low grade dysplasia   • COLONOSCOPY N/A 10/28/2019    Procedure: COLONOSCOPY WITH COLD AND HOT POLYPECTOMIES;  Surgeon: Micaela English MD;  Location: [facility] ENDOSCOPY;  Service: Gastroenterology   • ENDOSCOPY N/A 2021    Procedure: ESOPHAGOGASTRODUODENOSCOPY with BX;  Surgeon: Stefan Mcfadden MD;  Location: [facility] ENDOSCOPY;  Service: Gastroenterology;  Laterality: N/A;  EPIGASTRIC PAIN  --DUODENAL ULCER, HEMORRHAGIC GASTRITIS, HIATAL HERNIA    • HEMORRHOIDECTOMY     • HYSTERECTOMY     • JOINT REPLACEMENT     • KNEE ARTHROPLASTY     • MYOMECTOMY     • PACEMAKER IMPLANTATION     • SHOULDER SURGERY     • SINUS SURGERY     • TOE NAIL AMPUTATION  2019   • TONSILLECTOMY         Family History   Problem Relation Age of Onset   • Diabetes Mother    • Breast cancer Sister    • Kidney cancer Sister    • Heart disease Sister    • Prostate cancer Brother    • Prostate cancer Brother    • Prostate cancer Brother    • Malig Hyperthermia Neg Hx        Social History     Socioeconomic History   • Marital status:    • Number of children: 10   • Years of education: High School   Tobacco Use   • Smoking status: Former Smoker     Packs/day: 1.50     Years: 10.00     Pack years: 15.00     Start date:      Quit date:      Years since quittin.5   • Smokeless tobacco: Never Used   • Tobacco comment: QUIT SMOKING    Vaping Use   •  Vaping Use: Never used   Substance and Sexual Activity   • Alcohol use: No   • Drug use: Defer   • Sexual activity: Defer       Allergies   Allergen Reactions   • Codeine Hallucinations     Tolerates hydromorphone   • Amitriptyline Rash   • Amoxicillin-Pot Clavulanate Rash   • Aspirin Unknown - Low Severity     Patient doesn't know why   • Bactrim [Sulfamethoxazole-Trimethoprim] Rash   • Carisoprodol-Aspirin-Codeine Palpitations   • Iodinated Diagnostic Agents Rash   • Latex Rash   • Naproxen Rash   • Nsaids Unknown - Low Severity     unknown   • Soma Compound With Codeine [Carisoprodol-Aspirin-Codeine] Rash   • Sulfa Antibiotics Rash   • Tramadol Palpitations     heart races        Current Outpatient Medications on File Prior to Visit   Medication Sig Dispense Refill   • acetaminophen (TYLENOL) 500 MG tablet Take 1 tablet by mouth Every 6 (Six) Hours As Needed for Mild Pain .     • albuterol sulfate  (90 Base) MCG/ACT inhaler Inhale 2 puffs Every 4 (Four) Hours As Needed for Wheezing (or cough). 18 g 0   • docusate sodium (COLACE) 100 MG capsule Take 100 mg by mouth Every Night.     • ELDERBERRY PO Take 2 tablets by mouth Daily.     • fexofenadine (ALLEGRA) 180 MG tablet Take 180 mg by mouth As Needed.     • furosemide (Lasix) 20 MG tablet Take 1 tablet by mouth Daily As Needed (ankle swelling). 30 tablet 0   • magnesium oxide (MAGOX) 400 (241.3 Mg) MG tablet tablet TAKE 1 TABLET EVERY DAY 90 tablet 1   • mesalamine (APRISO) 0.375 g 24 hr capsule Take 4 capsules by mouth Daily. 360 capsule 3   • pantoprazole (PROTONIX) 40 MG EC tablet TAKE 1 TABLET TWICE DAILY 180 tablet 1   • vitamin B-12 (CYANOCOBALAMIN) 1000 MCG tablet Take 1,000 mcg by mouth Daily.     • [DISCONTINUED] mesalamine (CANASA) 1000 MG suppository Insert 1 suppository into the rectum Every Night for 42 days. 30 suppository 0     No current facility-administered medications on file prior to visit.       Review of Systems   Constitutional:  "Negative for chills and fever.   HENT: Negative for trouble swallowing.    Gastrointestinal: Negative for abdominal pain, blood in stool, constipation and diarrhea.        Objective   Vital Signs:   /69   Pulse 63   Temp 96.4 °F (35.8 °C)   Ht 167.6 cm (66\")   Wt 88.9 kg (196 lb)   BMI 31.64 kg/m²       Physical Exam  Vitals and nursing note reviewed.   Constitutional:       General: She is not in acute distress.     Appearance: Normal appearance. She is ill-appearing (Chronically).   HENT:      Head: Normocephalic and atraumatic.      Right Ear: External ear normal.      Left Ear: External ear normal.   Eyes:      General: No scleral icterus.     Conjunctiva/sclera: Conjunctivae normal.      Pupils: Pupils are equal, round, and reactive to light.   Pulmonary:      Effort: Pulmonary effort is normal.   Musculoskeletal:      Cervical back: Normal range of motion and neck supple.   Skin:     General: Skin is warm and dry.   Neurological:      Mental Status: She is alert and oriented to person, place, and time.   Psychiatric:         Mood and Affect: Mood normal.         Behavior: Behavior normal.          Result Review :       Common labs    Common Labsle 1/24/22 1/24/22 4/25/22 4/25/22 4/25/22 6/28/22 6/28/22    1747 1747 1641 1641 1641 1451 1451   Glucose  93  106 (A)   98   BUN  7 (A)  16   15   Creatinine  0.60  0.90   0.85   eGFR African Am  115        Sodium  140  140   144   Potassium  4.2  4.7   4.5   Chloride  105  104   106   Calcium  9.8  10.8 (A)   10.2   Total Protein    7.9   7.8   Albumin  3.50  3.9   4.0   Total Bilirubin  0.8  0.8   1.3 (A)   Alkaline Phosphatase  95  93   88   AST (SGOT)  30  19   16   ALT (SGPT)  13  14   5   WBC 2.35 (A)  4.3   4.1    Hemoglobin 14.3  12.3   12.2    Hematocrit 42.9  37.9   37.5    Platelets 123 (A)  199   132 (A)    Hemoglobin A1C     5.4     (A) Abnormal value       Comments are available for some flowsheets but are not being displayed.                  "              Assessment and Plan    Diagnoses and all orders for this visit:    1. 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HCC) (Primary)      Pleasant 87-year-old female with a history of ulcerative rectosigmoiditis who presents following recent flare.  She has been taking Apriso and Canasa suppositories as prescribed.  Her rectal bleeding has completely resolved.  She is having no further episodes of lower abdominal pain.  Her bowels are moving well and without difficulty.    · Continue Apriso 1.5 g daily.  · She may discontinue the Canasa suppositories.  · Follow-up in 3 months.  We did discuss that should she begin to experience resumption of symptoms she is to contact the office immediately.      Follow Up   Return in about 3 months (around 10/18/2022).    Dragon dictation used throughout this note.     BELLO Mclaughlin

## 2022-07-18 ENCOUNTER — OFFICE VISIT (OUTPATIENT)
Dept: GASTROENTEROLOGY | Facility: CLINIC | Age: 87
End: 2022-07-18

## 2022-07-18 VITALS
WEIGHT: 196 LBS | HEART RATE: 63 BPM | SYSTOLIC BLOOD PRESSURE: 123 MMHG | TEMPERATURE: 96.4 F | BODY MASS INDEX: 31.5 KG/M2 | DIASTOLIC BLOOD PRESSURE: 69 MMHG | HEIGHT: 66 IN

## 2022-07-18 DIAGNOSIS — K51.30 ULCERATIVE RECTOSIGMOIDITIS WITHOUT COMPLICATION: Primary | Chronic | ICD-10-CM

## 2022-07-18 PROCEDURE — 99214 OFFICE O/P EST MOD 30 MIN: CPT | Performed by: PHYSICIAN ASSISTANT

## 2022-08-01 ENCOUNTER — TELEMEDICINE (OUTPATIENT)
Dept: FAMILY MEDICINE CLINIC | Facility: CLINIC | Age: 87
End: 2022-08-01

## 2022-08-01 VITALS
OXYGEN SATURATION: 97 % | DIASTOLIC BLOOD PRESSURE: 81 MMHG | WEIGHT: 187 LBS | HEART RATE: 70 BPM | SYSTOLIC BLOOD PRESSURE: 133 MMHG | HEIGHT: 65 IN | BODY MASS INDEX: 31.16 KG/M2

## 2022-08-01 DIAGNOSIS — I10 ESSENTIAL HYPERTENSION: Primary | ICD-10-CM

## 2022-08-01 PROCEDURE — 99213 OFFICE O/P EST LOW 20 MIN: CPT | Performed by: FAMILY MEDICINE

## 2022-08-01 RX ORDER — POTASSIUM CHLORIDE 750 MG/1
10 TABLET, FILM COATED, EXTENDED RELEASE ORAL DAILY
Qty: 90 TABLET | Refills: 1 | Status: SHIPPED | OUTPATIENT
Start: 2022-08-01 | End: 2023-01-27

## 2022-08-01 RX ORDER — MESALAMINE 1000 MG/1
1000 SUPPOSITORY RECTAL NIGHTLY
COMMUNITY
End: 2022-11-01

## 2022-08-01 RX ORDER — MAGNESIUM OXIDE 400 MG/1
400 TABLET ORAL DAILY
Qty: 90 TABLET | Refills: 1 | Status: SHIPPED | OUTPATIENT
Start: 2022-08-01 | End: 2023-01-27

## 2022-08-01 RX ORDER — FUROSEMIDE 20 MG/1
20 TABLET ORAL DAILY
Qty: 90 TABLET | Refills: 1 | Status: ON HOLD | OUTPATIENT
Start: 2022-08-01 | End: 2022-11-02

## 2022-08-01 NOTE — PROGRESS NOTES
"Chief Complaint  Edema    Subjective        Brittany Villeda presents to [facility] PRIMARY CARE  History of Present Illness     You have chosen to receive care through a telehealth visit.  Do you consent to use a video/audio connection for your medical care today? Yes    Follow-up lower extremity edema.  It comes and goes.  She is on as needed at furosemide 20 mg a day which works.  She gets some shortness of breath with exertion.  I did not have her come in the office today because she is not vaccinated against COVID-19 and we have many patients in the office today with COVID-19.  Her daughter was also present during the televisit.  She has had no fever.  She is having some mucus in her throat at times it bothers her but no trouble with food getting stuck when she swallows and regular basis.  Her acid reflux is controlled with pantoprazole.  She is up walking more now but she gets somewhat winded with walking.  No recent illness.  At home her blood pressure is mildly elevated today 133/81.        Objective   Vital Signs:  /81   Pulse 70   Ht 163.8 cm (64.5\")   Wt 84.8 kg (187 lb)   SpO2 97%   BMI 31.60 kg/m²   Estimated body mass index is 31.6 kg/m² as calculated from the following:    Height as of this encounter: 163.8 cm (64.5\").    Weight as of this encounter: 84.8 kg (187 lb).          Physical Exam  Constitutional:       Comments: Patient is well-known to me.  Good video connection.  She appears in no acute distress.  She is speaking complete sentences.  No respiratory distress.  Her O2 saturation at home today is 97% on room air.        Result Review :                Assessment and Plan   Diagnoses and all orders for this visit:    1. Essential hypertension (Primary)    Other orders  -     furosemide (Lasix) 20 MG tablet; Take 1 tablet by mouth Daily.  Dispense: 90 tablet; Refill: 1  -     potassium chloride 10 MEQ CR tablet; Take 1 tablet by mouth Daily.  Dispense: 90 tablet; " Refill: 1  -     magnesium oxide (MAG-OX) 400 MG tablet; Take 1 tablet by mouth Daily.  Dispense: 90 tablet; Refill: 1      Hypertension.  I recommend she take the furosemide 20 mg daily.  I refilled her magnesium 40 mg daily, stop with diarrhea.  I also want her to start potassium 10 mEq daily.  I will see her back in the office in 3 months.  I strongly recommended that the patient get vaccinated against COVID-19.  She states she will consider it.       I spent 16 minutes caring for Brittany on this date of service. This time includes time spent by me in the following activities:preparing for the visit, reviewing tests, obtaining and/or reviewing a separately obtained history, performing a medically appropriate examination and/or evaluation , counseling and educating the patient/family/caregiver, ordering medications, tests, or procedures and documenting information in the medical record  Follow Up   No follow-ups on file.  Patient was given instructions and counseling regarding her condition or for health maintenance advice. Please see specific information pulled into the AVS if appropriate.

## 2022-09-22 ENCOUNTER — TELEPHONE (OUTPATIENT)
Dept: GASTROENTEROLOGY | Facility: CLINIC | Age: 87
End: 2022-09-22

## 2022-09-23 ENCOUNTER — TELEPHONE (OUTPATIENT)
Dept: GASTROENTEROLOGY | Facility: CLINIC | Age: 87
End: 2022-09-23

## 2022-09-23 NOTE — TELEPHONE ENCOUNTER
Caller: Brittany Villeda    Relationship: Self    Best call back number: [phone]    What is the best time to reach you: ANYTIME    Who are you requesting to speak with (clinical staff, provider,  specific staff member): CLINICAL STAFF    Do you know the name of the person who called: ARUN/DR MOSCOSO'S NURSE    Do you require a callback: YES

## 2022-09-26 ENCOUNTER — HOSPITAL ENCOUNTER (OUTPATIENT)
Dept: CARDIOLOGY | Facility: HOSPITAL | Age: 87
Discharge: HOME | End: 2022-09-26
Admitting: NURSE PRACTITIONER

## 2022-09-26 ENCOUNTER — OFFICE VISIT (OUTPATIENT)
Dept: CARDIOLOGY | Facility: CLINIC | Age: 87
End: 2022-09-26

## 2022-09-26 ENCOUNTER — CLINICAL SUPPORT NO REQUIREMENTS (OUTPATIENT)
Dept: CARDIOLOGY | Facility: CLINIC | Age: 87
End: 2022-09-26

## 2022-09-26 ENCOUNTER — TELEPHONE (OUTPATIENT)
Dept: CARDIOLOGY | Facility: CLINIC | Age: 87
End: 2022-09-26

## 2022-09-26 VITALS
BODY MASS INDEX: 31.82 KG/M2 | WEIGHT: 186.4 LBS | HEART RATE: 70 BPM | OXYGEN SATURATION: 98 % | HEIGHT: 64 IN | SYSTOLIC BLOOD PRESSURE: 130 MMHG | DIASTOLIC BLOOD PRESSURE: 70 MMHG

## 2022-09-26 DIAGNOSIS — I48.91 ATRIAL FIBRILLATION, UNSPECIFIED TYPE: Chronic | ICD-10-CM

## 2022-09-26 DIAGNOSIS — Z95.0 PRESENCE OF CARDIAC PACEMAKER: Primary | ICD-10-CM

## 2022-09-26 DIAGNOSIS — I51.89 DIASTOLIC DYSFUNCTION: Chronic | ICD-10-CM

## 2022-09-26 DIAGNOSIS — D64.9 ANEMIA, UNSPECIFIED TYPE: ICD-10-CM

## 2022-09-26 DIAGNOSIS — I44.2 AV BLOCK, COMPLETE: Primary | ICD-10-CM

## 2022-09-26 DIAGNOSIS — I10 ESSENTIAL HYPERTENSION: ICD-10-CM

## 2022-09-26 LAB
DEPRECATED RDW RBC AUTO: 50 FL (ref 37–54)
ERYTHROCYTE [DISTWIDTH] IN BLOOD BY AUTOMATED COUNT: 13.2 % (ref 12.3–15.4)
HCT VFR BLD AUTO: 38.5 % (ref 34–46.6)
HGB BLD-MCNC: 12 G/DL (ref 12–15.9)
MCH RBC QN AUTO: 31.7 PG (ref 26.6–33)
MCHC RBC AUTO-ENTMCNC: 31.2 G/DL (ref 31.5–35.7)
MCV RBC AUTO: 101.9 FL (ref 79–97)
PLATELET # BLD AUTO: 123 10*3/MM3 (ref 140–450)
PMV BLD AUTO: 9.8 FL (ref 6–12)
RBC # BLD AUTO: 3.78 10*6/MM3 (ref 3.77–5.28)
WBC NRBC COR # BLD: 3.1 10*3/MM3 (ref 3.4–10.8)

## 2022-09-26 PROCEDURE — 36415 COLL VENOUS BLD VENIPUNCTURE: CPT

## 2022-09-26 PROCEDURE — 93279 PRGRMG DEV EVAL PM/LDLS PM: CPT | Performed by: INTERNAL MEDICINE

## 2022-09-26 PROCEDURE — 93000 ELECTROCARDIOGRAM COMPLETE: CPT | Performed by: NURSE PRACTITIONER

## 2022-09-26 PROCEDURE — 99214 OFFICE O/P EST MOD 30 MIN: CPT | Performed by: NURSE PRACTITIONER

## 2022-09-26 PROCEDURE — 85027 COMPLETE CBC AUTOMATED: CPT | Performed by: NURSE PRACTITIONER

## 2022-09-26 NOTE — PROGRESS NOTES
[facility] Follow Up Office Note     Encounter Date:22  Patient:Brittany Villeda  :1935  MRN:[id number]      Chief Complaint:   Chief Complaint   Patient presents with   • Dizziness   • Palpitations     Only when laying down.    • Follow-up         History of Presenting Illness:        Brittany Villeda is a 87 y.o. female who is here for follow-up.  She is a patient of Dr Culp.    Patient has known paroxysmal atrial fibrillation, hypertension, sick sinus syndrome status post permanent pacemaker, borderline dilated aortic root, HUGO, obesity, immobility, pulmonary hypertension.    In  patient had nonsustained VT, stressed perfusion study negative for ischemia.  LVEF normal on echo.    In  patient complained of persistent dizziness in setting of new onset paroxysmal atrial fibrillation.  During hospitalization she was ruled out for acute CVA and her symptoms seem to more vestibular in nature.  She developed some bradycardia which resolved with treatment of sleep apnea.    In 2017 patient presented with chest pain.  Stress perfusion study negative for ischemia with normal LVEF.  ZIO revealed sinus rhythm with symptomatic SVT.  In 2018 EKG showed pauses in setting of bifascicular block.  24-hour Holter revealed 65 pauses, the longest 3 seconds in duration.  Also episode of 2-1 block with questionable third-degree block.  Pacemaker was recommended at this time.  This was placed in 2018.    Echocardiogram 2021 with preserved LVEF and RV dysfunction with right atrial enlargement severe left atrial enlargement.  CT angiogram of chest and abdomen May 2021 with dilated ascending aorta 4.1 cm.    In 2021 patient presented to the hospital with slurred speech and headache.  CT showed intercerebral hemorrhage.  Neurosurgery consulted.  She was started on Keppra.  Warfarin and Lasix were held at discharge.    Patient was last seen by SHAHID Nix in  March 2022.  No changes were made.    Patient has no complaints today other than some chronic back pain.  Her daughter mentions she has been seeing blood in her stool, she says that her bowel movements are sometimes dark as well with some blood in the mucus.  She denies palpitations, dizziness, chest pain, dyspnea with exertion, lower extremity edema.  She uses a walker at baseline and is relatively sedentary.      Review of Systems:  Review of Systems   Cardiovascular: Negative for chest pain, dyspnea on exertion, leg swelling, orthopnea and palpitations.   Respiratory: Negative for shortness of breath.        Current Outpatient Medications on File Prior to Visit   Medication Sig Dispense Refill   • acetaminophen (TYLENOL) 500 MG tablet Take 1 tablet by mouth Every 6 (Six) Hours As Needed for Mild Pain .     • albuterol sulfate  (90 Base) MCG/ACT inhaler Inhale 2 puffs Every 4 (Four) Hours As Needed for Wheezing (or cough). 18 g 0   • docusate sodium (COLACE) 100 MG capsule Take 100 mg by mouth Every Night.     • ELDERBERRY PO Take 2 tablets by mouth Daily.     • fexofenadine (ALLEGRA) 180 MG tablet Take 180 mg by mouth As Needed.     • furosemide (Lasix) 20 MG tablet Take 1 tablet by mouth Daily. 90 tablet 1   • magnesium oxide (MAG-OX) 400 MG tablet Take 1 tablet by mouth Daily. 90 tablet 1   • mesalamine (APRISO) 0.375 g 24 hr capsule Take 4 capsules by mouth Daily. 360 capsule 3   • mesalamine (CANASA) 1000 MG suppository Insert 1,000 mg into the rectum Every Night.     • pantoprazole (PROTONIX) 40 MG EC tablet TAKE 1 TABLET TWICE DAILY 180 tablet 1   • potassium chloride 10 MEQ CR tablet Take 1 tablet by mouth Daily. 90 tablet 1   • vitamin B-12 (CYANOCOBALAMIN) 1000 MCG tablet Take 1,000 mcg by mouth Daily.       No current facility-administered medications on file prior to visit.       Allergies   Allergen Reactions   • Codeine Hallucinations     Tolerates hydromorphone   • Amitriptyline Rash   •  Amoxicillin-Pot Clavulanate Rash   • Aspirin Unknown - Low Severity     Patient doesn't know why   • Bactrim [Sulfamethoxazole-Trimethoprim] Rash   • Carisoprodol-Aspirin-Codeine Palpitations   • Iodinated Diagnostic Agents Rash   • Latex Rash   • Naproxen Rash   • Nsaids Unknown - Low Severity     unknown   • Soma Compound With Codeine [Carisoprodol-Aspirin-Codeine] Rash   • Sulfa Antibiotics Rash   • Tramadol Palpitations     heart races        Past Medical History:   Diagnosis Date   • Acute UTI (urinary tract infection) 4/8/2021   • Anemia    • Arrhythmia    • Arthritis    • Asthma    • Bradycardia    • Chest pain    • Choledocholithiasis 5/9/2021   • Colitis    • COPD (chronic obstructive pulmonary disease) ([facility])    • Diastolic dysfunction    • Essential hypertension 5/12/2016   • GERD (gastroesophageal reflux disease)    • Heart block    • Hypertension    • Hypertensive heart disease    • Hyperthyroidism    • Kidney stone    • Leukopenia    • Low back pain    • MGUS (monoclonal gammopathy of unknown significance)    • Nephrolithiasis    • Obesity    • Paroxysmal atrial fibrillation (HCC)    • Peptic ulceration    • Persistent atrial fibrillation (HCC)    • PSVT (paroxysmal supraventricular tachycardia) ([facility])    • Pulmonary hypertension ([facility])    • Rectal bleed    • Sleep apnea    • Systemic hypertension    • Trifascicular block    • 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[facility])    • Ventricular tachycardia (HCC)     nonsustained   • Vertigo        Past Surgical History:   Procedure Laterality Date   • BACK SURGERY      lumbar fusion   • PAWAN HOLE Left 8/8/2021    Procedure: Left-sided pawan holes for evacuation of subdural hematoma;  Surgeon: Gustavo Callaway MD;  Location: [facility] OR;  Service: Neurosurgery;  Laterality: Left;   • CARDIAC ELECTROPHYSIOLOGY PROCEDURE N/A 11/7/2018    Procedure: Pacemaker DC new   [place];  Surgeon: Jesus Granda MD;  Location: [facility] CATH INVASIVE LOCATION;   Service: Cardiology   • CHOLECYSTECTOMY     • COLONOSCOPY  2014    colitis, cryptitis,  tics, NBIH, TA w/low grade dysplasia   • COLONOSCOPY N/A 10/28/2019    Procedure: COLONOSCOPY WITH COLD AND HOT POLYPECTOMIES;  Surgeon: Micaela English MD;  Location:  [facility];  Service: Gastroenterology   • ENDOSCOPY N/A 2021    Procedure: ESOPHAGOGASTRODUODENOSCOPY with BX;  Surgeon: Stefan Mcfadden MD;  Location:  [facility];  Service: Gastroenterology;  Laterality: N/A;  EPIGASTRIC PAIN  --DUODENAL ULCER, HEMORRHAGIC GASTRITIS, HIATAL HERNIA    • HEMORRHOIDECTOMY     • HYSTERECTOMY     • JOINT REPLACEMENT     • KNEE ARTHROPLASTY     • MYOMECTOMY     • PACEMAKER IMPLANTATION     • SHOULDER SURGERY     • SINUS SURGERY     • TOE NAIL AMPUTATION  2019   • TONSILLECTOMY         Social History     Socioeconomic History   • Marital status:    • Number of children: 10   • Years of education: High School   Tobacco Use   • Smoking status: Former Smoker     Packs/day: 1.50     Years: 10.00     Pack years: 15.00     Start date:      Quit date:      Years since quittin.7   • Smokeless tobacco: Never Used   • Tobacco comment: QUIT SMOKING    Vaping Use   • Vaping Use: Never used   Substance and Sexual Activity   • Alcohol use: No   • Drug use: Defer   • Sexual activity: Defer       Family History   Problem Relation Age of Onset   • Diabetes Mother    • Breast cancer Sister    • Kidney cancer Sister    • Heart disease Sister    • Prostate cancer Brother    • Prostate cancer Brother    • Prostate cancer Brother    • Malig Hyperthermia Neg Hx        The following portions of the patient's history were reviewed and updated as appropriate: allergies, current medications, past family history, past medical history, past social history, past surgical history and problem list.       Objective:       Vitals:    22 1121   BP: 130/70   BP Location: Left arm   Patient Position:  "Sitting   Cuff Size: Adult   Pulse: 70   SpO2: 98%   Weight: 84.6 kg (186 lb 6.4 oz)   Height: 162.6 cm (64\")         Physical Exam:  Constitutional:  Elderly, pleasant.  Using rolling walker  HENT: Oropharynx clear and membrane moist  Eyes: Normal conjunctiva, no sclera icterus  Neck: Supple, no carotid bruit bilaterally  Cardiovascular: Regular rate and rhythm, No Murmur, trace bilateral lower extremity edema  Pulmonary: Normal respiratory effort, normal lung sounds, no wheezing  Neurological: Alert and orient x 3  Skin: Warm, dry, no ecchymosis, no rash  Psych: Appropriate mood and affect. Normal judgment and insight         Lab Results   Component Value Date     06/28/2022     04/25/2022    K 4.5 06/28/2022    K 4.7 04/25/2022     06/28/2022     04/25/2022    CO2 24 06/28/2022    CO2 22 04/25/2022    BUN 15 06/28/2022    BUN 16 04/25/2022    CREATININE 0.85 06/28/2022    CREATININE 0.90 04/25/2022    EGFRIFNONA 77 07/29/2021    EGFRIFNONA 48 (L) 06/19/2020    EGFRIFAFRI 115 01/24/2022    EGFRIFAFRI 90 10/25/2021    GLUCOSE 98 06/28/2022    GLUCOSE 106 (H) 04/25/2022    CALCIUM 10.2 06/28/2022    CALCIUM 10.8 (H) 04/25/2022    PROTENTOTREF 7.8 06/28/2022    PROTENTOTREF 7.9 04/25/2022    ALBUMIN 4.0 06/28/2022    ALBUMIN 3.9 04/25/2022    BILITOT 1.3 (H) 06/28/2022    BILITOT 0.8 04/25/2022    AST 16 06/28/2022    AST 19 04/25/2022    ALT 5 06/28/2022    ALT 14 04/25/2022     Lab Results   Component Value Date    WBC 4.1 06/28/2022    WBC 4.3 04/25/2022    HGB 12.2 06/28/2022    HGB 12.3 04/25/2022    HCT 37.5 06/28/2022    HCT 37.9 04/25/2022    MCV 94 06/28/2022    MCV 98 (H) 04/25/2022     (L) 06/28/2022     04/25/2022     Lab Results   Component Value Date    CHOL 141 12/25/2017    TRIG 55 12/25/2017    HDL 64 (H) 12/25/2017    LDL 66 12/25/2017     Lab Results   Component Value Date    PROBNP 762.0 01/24/2022    PROBNP 2,530.0 (H) 08/13/2021     Lab Results   Component " Value Date    CKTOTAL 194 (H) 04/13/2021    TROPONINT <0.010 01/24/2022     Lab Results   Component Value Date    TSH 0.854 04/25/2022    TSH 0.544 04/11/2021           ECG 12 Lead    Date/Time: 9/26/2022 11:39 AM  Performed by: Cassie Ojeda APRN  Authorized by: Cassie Ojeda APRN   Comparison: compared with previous ECG from 3/14/2022  Similar to previous ECG  Rhythm: paced  Rate: normal  Pacing: ventricular pacing             Assessment:          Diagnosis Plan   1. Presence of cardiac pacemaker  ECG 12 Lead   2. Anemia, unspecified type  CBC (No Diff)   3. Atrial fibrillation, unspecified type (HCC)     4. Essential hypertension     5. Diastolic dysfunction            Plan:       Permanent atrial fibrillation/sick sinus syndrome status post permanent pacemaker - V paced with 100% A. fib on interrogation.  She is not on anticoagulation due to previous hemorrhagic stroke and frailty.  She has concerns for GI bleeding today additionally.  Denies palpitations or dizziness    Essential hypertension - BP is controlled.  I am not making any medication changes    Chronic diastolic heart failure - appears compensated on exam.  She is on 20 mg oral Lasix as well as potassium supplement.  Her most recent labs are appropriate on these medications    Mild thoracic aorta dilation - reviewed CT 10/2021 which shows stable mild dilation.  BP is controlled.  I am not ordering additional imaging at this time    Blood in stool - reported per patient, small amount and dark stools.  Check CBC today.  Continue off blood thinners.  Scheduled to see GI    Patient is seen today for follow-up.  She is at her baseline, she is using a rolling walker and has some dyspnea but appears compensated from a heart failure perspective.  I am not making any changes today.  I will check labs with her report of blood in stools to make sure she is not significantly anemic.  She has scheduled follow-up with GI.  Follow-up with Dr. Muro in 6  months or earlier with problems    Orders Placed This Encounter   Procedures   • CBC (No Diff)     Standing Status:   Future     Number of Occurrences:   1     Standing Expiration Date:   9/26/2023     Order Specific Question:   Release to patient     Answer:   Routine Release   • ECG 12 Lead     This order was created via procedure documentation     Order Specific Question:   Release to patient     Answer:   Routine Release            SHAHID Hay  [facility]  09/26/22  12:05 EDT

## 2022-09-26 NOTE — TELEPHONE ENCOUNTER
Pt was seen in clinic today, she has a BSX PPM device that you implanted on 11/7/2018 and has not followed up with you but sees Dr. Culp and per telephone message from 11/15/2018 you said that pt could just follow with Dr. Culp unless new issues arise. Pt has not had any new events seen today, just AF that is not new and her burden appears to be 100% since at least 7/15/2019. I just wanted to make sure you are still okay with pt just being followed by Dr. Culp and if we could change her mode from DDDR 60 bpm to VVIR 60 bpm the next time she is seen in the office?

## 2022-09-29 ENCOUNTER — TELEPHONE (OUTPATIENT)
Dept: GASTROENTEROLOGY | Facility: CLINIC | Age: 87
End: 2022-09-29

## 2022-09-29 ENCOUNTER — OFFICE VISIT (OUTPATIENT)
Dept: GASTROENTEROLOGY | Facility: CLINIC | Age: 87
End: 2022-09-29

## 2022-09-29 ENCOUNTER — PREP FOR SURGERY (OUTPATIENT)
Dept: OTHER | Facility: HOSPITAL | Age: 87
End: 2022-09-29

## 2022-09-29 VITALS
SYSTOLIC BLOOD PRESSURE: 124 MMHG | WEIGHT: 186.4 LBS | DIASTOLIC BLOOD PRESSURE: 75 MMHG | BODY MASS INDEX: 31.82 KG/M2 | HEART RATE: 79 BPM | HEIGHT: 64 IN | TEMPERATURE: 97.1 F

## 2022-09-29 DIAGNOSIS — K62.5 RECTAL BLEEDING: ICD-10-CM

## 2022-09-29 DIAGNOSIS — K27.9 PUD (PEPTIC ULCER DISEASE): ICD-10-CM

## 2022-09-29 DIAGNOSIS — Z87.898 HISTORY OF ULCER DISEASE: ICD-10-CM

## 2022-09-29 DIAGNOSIS — K92.1 MELENA: Primary | ICD-10-CM

## 2022-09-29 DIAGNOSIS — K51.30 ULCERATIVE RECTOSIGMOIDITIS WITHOUT COMPLICATION: ICD-10-CM

## 2022-09-29 PROCEDURE — 99214 OFFICE O/P EST MOD 30 MIN: CPT | Performed by: INTERNAL MEDICINE

## 2022-09-29 RX ORDER — POTASSIUM CHLORIDE 750 MG/1
10 TABLET, EXTENDED RELEASE ORAL DAILY
COMMUNITY
Start: 2022-08-02 | End: 2022-11-02 | Stop reason: HOSPADM

## 2022-09-29 NOTE — PROGRESS NOTES
Subjective   Chief Complaint   Patient presents with   • Rectal Bleeding   • Black or Bloody Stool       Brittany Villeda is a  87 y.o. female here for a follow up visit for rectal bleeding and black stool.  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  She also has a history of small internal hemorrhoids and diverticulosis.  Patient reports soft stools with mucus and blood-tinged mucus.  Last week she had an episode of more copious bright red blood.  She is also been having fairly consistent black stool.  Hemoglobin on 926 was 12.  She is on no anticoagulation.  She is started back on Canasa and has been compliant with her Apriso 1.5 g daily.  She also takes Protonix 40 mg p.o. twice daily.  She does have a history of peptic ulcer disease.  EGD in 2021 notable for duodenal ulcer as well as hemorrhagic gastritis.  She denies any upper GI symptoms..   HPI  Past Medical History:   Diagnosis Date   • Acute UTI (urinary tract infection) 4/8/2021   • Anemia    • Arrhythmia    • Arthritis    • Asthma    • Bradycardia    • Chest pain    • Choledocholithiasis 5/9/2021   • Colitis    • COPD (chronic obstructive pulmonary disease) ([facility])    • Diastolic dysfunction    • Essential hypertension 5/12/2016   • GERD (gastroesophageal reflux disease)    • Heart block    • Hypertension    • Hypertensive heart disease    • Hyperthyroidism    • Kidney stone    • Leukopenia    • Low back pain    • MGUS (monoclonal gammopathy of unknown significance)    • Nephrolithiasis    • Obesity    • Paroxysmal atrial fibrillation (HCC)    • Peptic ulceration    • Persistent atrial fibrillation (HCC)    • PSVT (paroxysmal supraventricular tachycardia) ([facility])    • Pulmonary hypertension (HCC)    • Rectal bleed    • Sleep apnea    • Systemic hypertension    • Trifascicular block    • 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[facility])    • Ventricular tachycardia (HCC)     nonsustained   • Vertigo      Past  Surgical History:   Procedure Laterality Date   • BACK SURGERY      lumbar fusion   • DUSTY HOLE Left 8/8/2021    Procedure: Left-sided dusty holes for evacuation of subdural hematoma;  Surgeon: Gustavo Callaway MD;  Location: [facility] MAIN OR;  Service: Neurosurgery;  Laterality: Left;   • CARDIAC ELECTROPHYSIOLOGY PROCEDURE N/A 11/7/2018    Procedure: Pacemaker DC new   [place];  Surgeon: Jesus Granda MD;  Location: [facility] CATH INVASIVE LOCATION;  Service: Cardiology   • CHOLECYSTECTOMY     • COLONOSCOPY  06/16/2014    colitis, cryptitis,  tics, NBIH, TA w/low grade dysplasia   • COLONOSCOPY N/A 10/28/2019    Procedure: COLONOSCOPY WITH COLD AND HOT POLYPECTOMIES;  Surgeon: Micaela English MD;  Location[facility];  Service: Gastroenterology   • ENDOSCOPY N/A 5/13/2021    Procedure: ESOPHAGOGASTRODUODENOSCOPY with BX;  Surgeon: Stefan Mcfadden MD;  Location: [facility] ENDOSCOPY;  Service: Gastroenterology;  Laterality: N/A;  EPIGASTRIC PAIN  --DUODENAL ULCER, HEMORRHAGIC GASTRITIS, HIATAL HERNIA    • HEMORRHOIDECTOMY     • HYSTERECTOMY     • JOINT REPLACEMENT     • KNEE ARTHROPLASTY     • MYOMECTOMY     • PACEMAKER IMPLANTATION     • SHOULDER SURGERY     • SINUS SURGERY     • TOE NAIL AMPUTATION  03/04/2019   • TONSILLECTOMY         Current Outpatient Medications:   •  acetaminophen (TYLENOL) 500 MG tablet, Take 1 tablet by mouth Every 6 (Six) Hours As Needed for Mild Pain ., Disp: , Rfl:   •  albuterol sulfate  (90 Base) MCG/ACT inhaler, Inhale 2 puffs Every 4 (Four) Hours As Needed for Wheezing (or cough)., Disp: 18 g, Rfl: 0  •  docusate sodium (COLACE) 100 MG capsule, Take 100 mg by mouth Every Night., Disp: , Rfl:   •  ELDERBERRY PO, Take 2 tablets by mouth Daily., Disp: , Rfl:   •  fexofenadine (ALLEGRA) 180 MG tablet, Take 180 mg by mouth As Needed., Disp: , Rfl:   •  furosemide (Lasix) 20 MG tablet, Take 1 tablet by mouth Daily., Disp: 90 tablet, Rfl: 1  •  magnesium oxide (MAG-OX)  400 MG tablet, Take 1 tablet by mouth Daily., Disp: 90 tablet, Rfl: 1  •  mesalamine (APRISO) 0.375 g 24 hr capsule, Take 4 capsules by mouth Daily., Disp: 360 capsule, Rfl: 3  •  mesalamine (CANASA) 1000 MG suppository, Insert 1,000 mg into the rectum Every Night., Disp: , Rfl:   •  pantoprazole (PROTONIX) 40 MG EC tablet, TAKE 1 TABLET TWICE DAILY, Disp: 180 tablet, Rfl: 1  •  potassium chloride 10 MEQ CR tablet, Take 1 tablet by mouth Daily., Disp: 90 tablet, Rfl: 1  •  vitamin B-12 (CYANOCOBALAMIN) 1000 MCG tablet, Take 1,000 mcg by mouth Daily., Disp: , Rfl:   •  potassium chloride (K-DUR,KLOR-CON) 10 MEQ CR tablet, , Disp: , Rfl:   PRN Meds:.  Allergies   Allergen Reactions   • Codeine Hallucinations     Tolerates hydromorphone   • Amitriptyline Rash   • Amoxicillin-Pot Clavulanate Rash   • Aspirin Unknown - Low Severity     Patient doesn't know why   • Bactrim [Sulfamethoxazole-Trimethoprim] Rash   • Carisoprodol-Aspirin-Codeine Palpitations   • Iodinated Diagnostic Agents Rash   • Latex Rash   • Naproxen Rash   • Nsaids Unknown - Low Severity     unknown   • Soma Compound With Codeine [Carisoprodol-Aspirin-Codeine] Rash   • Sulfa Antibiotics Rash   • Tramadol Palpitations     heart races      Social History     Socioeconomic History   • Marital status:    • Number of children: 10   • Years of education: High School   Tobacco Use   • Smoking status: Former Smoker     Packs/day: 1.50     Years: 10.00     Pack years: 15.00     Start date:      Quit date:      Years since quittin.7   • Smokeless tobacco: Never Used   • Tobacco comment: QUIT SMOKING    Vaping Use   • Vaping Use: Never used   Substance and Sexual Activity   • Alcohol use: No   • Drug use: Defer   • Sexual activity: Defer     Family History   Problem Relation Age of Onset   • Diabetes Mother    • Breast cancer Sister    • Kidney cancer Sister    • Heart disease Sister    • Prostate cancer Brother    • Prostate cancer  Brother    • Prostate cancer Brother    • Malig Hyperthermia Neg Hx      Review of Systems   Constitutional: Negative for appetite change and unexpected weight change.   Gastrointestinal: Positive for blood in stool. Negative for constipation and diarrhea.     Vitals:    09/29/22 1257   BP: 124/75   Pulse: 79   Temp: 97.1 °F (36.2 °C)         09/29/22  1257   Weight: 84.6 kg (186 lb 6.4 oz)       Objective   Physical Exam  Constitutional:       Appearance: Normal appearance. She is well-developed.   HENT:      Head: Normocephalic and atraumatic.   Eyes:      General: No scleral icterus.     Conjunctiva/sclera: Conjunctivae normal.   Pulmonary:      Effort: Pulmonary effort is normal.   Abdominal:      General: There is no distension.      Palpations: Abdomen is soft.   Musculoskeletal:      Cervical back: Normal range of motion and neck supple.   Skin:     General: Skin is warm and dry.   Neurological:      Mental Status: She is alert.   Psychiatric:         Mood and Affect: Mood normal.         Behavior: Behavior normal.       No radiology results for the last 7 days    Assessment & Plan   Diagnoses and all orders for this visit:    Melena  -     Hemoglobin & Hematocrit, Blood  -     Ferritin    Rectal bleeding    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HCC)    History of ulcer disease    Other orders  -     potassium chloride (K-DUR,KLOR-CON) 10 MEQ CR tablet      Plan:  · Repeat H&H today to establish trend, check ferritin to see if she needs iron supplementation.  Thankfully her recent hemoglobin was normal  · She does have a history of peptic ulcer disease and is complaining of black stool.  Recommend EGD to ensure that previous ulcer has resolved and she is not dealing with ongoing ulceration.  Continue pantoprazole twice daily  · Continue Apriso 1.5 g/day, she has resumed Canasa and I want her to continue this for 30 days.

## 2022-09-29 NOTE — TELEPHONE ENCOUNTER
JAMES patient via telephone for. Scheduled 10/11 22  with arrival time of 2:40 pm . Prep paperwork mailed to verified address on file. Patient advised arrival time may change based on [facility] guidelines. JAMES LOVE

## 2022-09-30 ENCOUNTER — TELEPHONE (OUTPATIENT)
Dept: GASTROENTEROLOGY | Facility: CLINIC | Age: 87
End: 2022-09-30

## 2022-09-30 LAB
FERRITIN SERPL-MCNC: 65 NG/ML (ref 13–150)
HCT VFR BLD AUTO: 36.1 % (ref 34–46.6)
HGB BLD-MCNC: 11.9 G/DL (ref 12–15.9)

## 2022-09-30 NOTE — TELEPHONE ENCOUNTER
Called pt and spoke with pt's daughter and advised of Dr English's note. Verb understanding.     She reports that her mother's stools have been very dark to black for the last 2 wks and she is worried about cancer. Advised will send message to Dr English.

## 2022-09-30 NOTE — TELEPHONE ENCOUNTER
----- Message from Micaela English MD sent at 9/30/2022  9:32 AM EDT -----  Hemoglobin stable - iron normal.  Proceed with egd as scheduled

## 2022-10-11 ENCOUNTER — ANESTHESIA EVENT (OUTPATIENT)
Dept: GASTROENTEROLOGY | Facility: HOSPITAL | Age: 87
End: 2022-10-11

## 2022-10-11 ENCOUNTER — ANESTHESIA (OUTPATIENT)
Dept: GASTROENTEROLOGY | Facility: HOSPITAL | Age: 87
End: 2022-10-11

## 2022-10-11 ENCOUNTER — HOSPITAL ENCOUNTER (OUTPATIENT)
Facility: HOSPITAL | Age: 87
Setting detail: HOSPITAL OUTPATIENT SURGERY
Discharge: HOME | End: 2022-10-11
Attending: INTERNAL MEDICINE | Admitting: INTERNAL MEDICINE

## 2022-10-11 VITALS
DIASTOLIC BLOOD PRESSURE: 68 MMHG | RESPIRATION RATE: 20 BRPM | SYSTOLIC BLOOD PRESSURE: 111 MMHG | HEART RATE: 70 BPM | BODY MASS INDEX: 31.44 KG/M2 | WEIGHT: 183.19 LBS | TEMPERATURE: 97.9 F | OXYGEN SATURATION: 99 %

## 2022-10-11 LAB
HCT VFR BLD AUTO: 37 % (ref 34–46.6)
HGB BLD-MCNC: 12.2 G/DL (ref 12–15.9)

## 2022-10-11 PROCEDURE — 85014 HEMATOCRIT: CPT | Performed by: INTERNAL MEDICINE

## 2022-10-11 PROCEDURE — S0260 H&P FOR SURGERY: HCPCS | Performed by: INTERNAL MEDICINE

## 2022-10-11 PROCEDURE — 43235 EGD DIAGNOSTIC BRUSH WASH: CPT | Performed by: INTERNAL MEDICINE

## 2022-10-11 PROCEDURE — 85018 HEMOGLOBIN: CPT | Performed by: INTERNAL MEDICINE

## 2022-10-11 PROCEDURE — 25010000002 PROPOFOL 10 MG/ML EMULSION: Performed by: ANESTHESIOLOGY

## 2022-10-11 RX ORDER — LIDOCAINE HYDROCHLORIDE 20 MG/ML
INJECTION, SOLUTION INFILTRATION; PERINEURAL AS NEEDED
Status: DISCONTINUED | OUTPATIENT
Start: 2022-10-11 | End: 2022-10-11 | Stop reason: SURG

## 2022-10-11 RX ORDER — PROPOFOL 10 MG/ML
VIAL (ML) INTRAVENOUS AS NEEDED
Status: DISCONTINUED | OUTPATIENT
Start: 2022-10-11 | End: 2022-10-11 | Stop reason: SURG

## 2022-10-11 RX ORDER — SODIUM CHLORIDE, SODIUM LACTATE, POTASSIUM CHLORIDE, CALCIUM CHLORIDE 600; 310; 30; 20 MG/100ML; MG/100ML; MG/100ML; MG/100ML
30 INJECTION, SOLUTION INTRAVENOUS CONTINUOUS PRN
Status: DISCONTINUED | OUTPATIENT
Start: 2022-10-11 | End: 2022-10-11 | Stop reason: HOSPADM

## 2022-10-11 RX ADMIN — SODIUM CHLORIDE, POTASSIUM CHLORIDE, SODIUM LACTATE AND CALCIUM CHLORIDE 30 ML/HR: 600; 310; 30; 20 INJECTION, SOLUTION INTRAVENOUS at 13:52

## 2022-10-11 RX ADMIN — PROPOFOL 60 MG: 10 INJECTION, EMULSION INTRAVENOUS at 13:58

## 2022-10-11 RX ADMIN — LIDOCAINE HYDROCHLORIDE 60 MG: 20 INJECTION, SOLUTION INFILTRATION; PERINEURAL at 13:57

## 2022-10-11 RX ADMIN — PROPOFOL 40 MG: 10 INJECTION, EMULSION INTRAVENOUS at 13:57

## 2022-10-11 NOTE — H&P
[facility]  Pre Procedure History & Physical    Chief Complaint:   Melena, h/o pud    Subjective     HPI:   Brittany Villeda is a  87 y.o. female here for a follow up visit for rectal bleeding and black stool.  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  She also has a history of small internal hemorrhoids and diverticulosis.  Patient reports soft stools with mucus and blood-tinged mucus.  Last week she had an episode of more copious bright red blood.  She is also been having fairly consistent black stool.  Hemoglobin on 9/26 was 12.  She is on no anticoagulation.  She is started back on Canasa and has been compliant with her Apriso 1.5 g daily.  She also takes Protonix 40 mg p.o. twice daily.  She does have a history of peptic ulcer disease.  EGD in 2021 notable for duodenal ulcer as well as hemorrhagic gastritis.  She denies any upper GI symptoms..     Past Medical History:   Past Medical History:   Diagnosis Date   • Acute UTI (urinary tract infection) 04/08/2021   • Anemia    • Arrhythmia    • Arthritis    • Asthma    • Bradycardia    • Chest pain    • Choledocholithiasis 05/09/2021   • Colitis    • COPD (chronic obstructive pulmonary disease) ([facility])    • Diastolic dysfunction    • Essential hypertension 05/12/2016   • GERD (gastroesophageal reflux disease)    • Heart block    • Hypertension    • Hypertensive heart disease    • Hyperthyroidism    • Kidney stone    • Leukopenia    • Low back pain    • MGUS (monoclonal gammopathy of unknown significance)    • Nephrolithiasis    • Obesity    • Paroxysmal atrial fibrillation (HCC)    • Peptic ulceration    • Persistent atrial fibrillation ([facility])    • PSVT (paroxysmal supraventricular tachycardia) ([facility])    • Pulmonary hypertension ([facility])    • Rectal bleed    • Systemic hypertension    • Trifascicular block    • 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[facility])    • Ventricular tachycardia      nonsustained   • Vertigo        Past Surgical History:  Past Surgical History:   Procedure Laterality Date   • BACK SURGERY      lumbar fusion   • DUSTY HOLE Left 8/8/2021    Procedure: Left-sided dusty holes for evacuation of subdural hematoma;  Surgeon: Gustavo Callaway MD;  Location: [facility] MAIN OR;  Service: Neurosurgery;  Laterality: Left;   • CARDIAC ELECTROPHYSIOLOGY PROCEDURE N/A 11/7/2018    Procedure: Pacemaker DC new   [place];  Surgeon: Jesus Granda MD;  Location: [facility] CATH INVASIVE LOCATION;  Service: Cardiology   • CHOLECYSTECTOMY     • COLONOSCOPY  06/16/2014    colitis, cryptitis,  tics, NBIH, TA w/low grade dysplasia   • COLONOSCOPY N/A 10/28/2019    Procedure: COLONOSCOPY WITH COLD AND HOT POLYPECTOMIES;  Surgeon: Micaela English MD;  Location: [facility] ENDOSCOPY;  Service: Gastroenterology   • ENDOSCOPY N/A 5/13/2021    Procedure: ESOPHAGOGASTRODUODENOSCOPY with BX;  Surgeon: Stefan Mcfadden MD;  Location: [facility] ENDOSCOPY;  Service: Gastroenterology;  Laterality: N/A;  EPIGASTRIC PAIN  --DUODENAL ULCER, HEMORRHAGIC GASTRITIS, HIATAL HERNIA    • HEMORRHOIDECTOMY     • HYSTERECTOMY     • JOINT REPLACEMENT     • KNEE ARTHROPLASTY     • MYOMECTOMY     • PACEMAKER IMPLANTATION     • SHOULDER SURGERY     • SINUS SURGERY     • TOE NAIL AMPUTATION  03/04/2019   • TONSILLECTOMY         Family History:  Family History   Problem Relation Age of Onset   • Diabetes Mother    • Breast cancer Sister    • Kidney cancer Sister    • Heart disease Sister    • Prostate cancer Brother    • Prostate cancer Brother    • Prostate cancer Brother    • Malig Hyperthermia Neg Hx        Social History:   reports that she quit smoking about 57 years ago. Her smoking use included cigarettes. She started smoking about 69 years ago. She has a 15.00 pack-year smoking history. She has never used smokeless tobacco. She reports that she does not drink alcohol and does not use drugs.    Medications:   Medications  Prior to Admission   Medication Sig Dispense Refill Last Dose   • acetaminophen (TYLENOL) 500 MG tablet Take 1 tablet by mouth Every 6 (Six) Hours As Needed for Mild Pain .   10/10/2022   • albuterol sulfate  (90 Base) MCG/ACT inhaler Inhale 2 puffs Every 4 (Four) Hours As Needed for Wheezing (or cough). 18 g 0 Past Month   • ELDERBERRY PO Take 2 tablets by mouth Daily.   10/10/2022   • fexofenadine (ALLEGRA) 180 MG tablet Take 180 mg by mouth As Needed.   Past Month   • furosemide (Lasix) 20 MG tablet Take 1 tablet by mouth Daily. 90 tablet 1 Past Month   • magnesium oxide (MAG-OX) 400 MG tablet Take 1 tablet by mouth Daily. 90 tablet 1 10/10/2022   • mesalamine (APRISO) 0.375 g 24 hr capsule Take 4 capsules by mouth Daily. 360 capsule 3 10/10/2022   • mesalamine (CANASA) 1000 MG suppository Insert 1,000 mg into the rectum Every Night.   10/10/2022   • pantoprazole (PROTONIX) 40 MG EC tablet TAKE 1 TABLET TWICE DAILY 180 tablet 1 10/10/2022   • potassium chloride (K-DUR,KLOR-CON) 10 MEQ CR tablet    10/10/2022   • vitamin B-12 (CYANOCOBALAMIN) 1000 MCG tablet Take 1,000 mcg by mouth Daily.   10/10/2022   • docusate sodium (COLACE) 100 MG capsule Take 100 mg by mouth Every Night.   More than a month   • potassium chloride 10 MEQ CR tablet Take 1 tablet by mouth Daily. 90 tablet 1        Allergies:  Codeine, Amitriptyline, Amoxicillin-pot clavulanate, Aspirin, Bactrim [sulfamethoxazole-trimethoprim], Carisoprodol-aspirin-codeine, Iodinated diagnostic agents, Latex, Naproxen, Nsaids, Soma compound with codeine [carisoprodol-aspirin-codeine], Sulfa antibiotics, and Tramadol    ROS:    Pertinent items are noted in HPI, all other systems reviewed and negative     Objective     Blood pressure 132/73, pulse 70, resp. rate 16, weight 83.1 kg (183 lb 3 oz), SpO2 100 %, not currently breastfeeding.    Physical Exam   Constitutional: Pt is oriented to person, place, and time and well-developed, well-nourished, and in no  distress.   Mouth/Throat: Oropharynx is clear and moist.   Neck: Normal range of motion.   Cardiovascular: Normal rate, regular rhythm    Pulmonary/Chest: Effort normal    Abdominal: Soft. Nontender  Skin: Skin is warm and dry.   Psychiatric: Mood, memory, affect and judgment normal.     Assessment & Plan     Diagnosis:  Melena, h/o pud    Anticipated Surgical Procedure:  EGD with possible interventions    The risks, benefits, and alternatives of this procedure have been discussed with the patient or the responsible party- the patient understands and agrees to proceed.

## 2022-10-11 NOTE — ANESTHESIA POSTPROCEDURE EVALUATION
Patient: Brittany Villeda    Procedure Summary     Date: 10/11/22 Room / Location: [facility] ENDOSCOPY 10 /  [facility]    Anesthesia Start: 1352 Anesthesia Stop: 1406    Procedure: ESOPHAGOGASTRODUODENOSCOPY (Esophagus) Diagnosis:       Melena      PUD (peptic ulcer disease)      (Melena [K92.1])      (PUD (peptic ulcer disease) [K27.9])    Surgeons: Micaela English MD Provider: Jimy Jeffries MD    Anesthesia Type: MAC ASA Status: 3          Anesthesia Type: MAC    Vitals  Vitals Value Taken Time   /67 10/11/22 1405   Temp     Pulse 70 10/11/22 1410   Resp 18 10/11/22 1405   SpO2 96 % 10/11/22 1410   Vitals shown include unvalidated device data.        Post Anesthesia Care and Evaluation    Pain management: adequate    Airway patency: patent  Anesthetic complications: No anesthetic complications    Cardiovascular status: acceptable  Respiratory status: acceptable  Hydration status: acceptable    Comments: /67 (BP Location: Left arm, Patient Position: Lying)   Pulse 70   Resp 18   Wt 83.1 kg (183 lb 3 oz)   LMP  (LMP Unknown)   SpO2 96%   BMI 31.44 kg/m²

## 2022-10-11 NOTE — DISCHARGE INSTRUCTIONS

## 2022-10-11 NOTE — ANESTHESIA PREPROCEDURE EVALUATION
Anesthesia Evaluation                  Airway   Mallampati: II  Dental      Pulmonary    (+) a smoker Former, COPD, asthma,sleep apnea,     ROS comment: Positive HUGO screen/Diagnosis, 2 or more mitigating factors used (non-supine position, no narcotics)    Cardiovascular     (+) pacemaker pacemaker, hypertension, dysrhythmias Atrial Fib, CHF Diastolic >=55%,       Neuro/Psych  (+) dizziness/light headedness,    GI/Hepatic/Renal/Endo    (+) obesity,  GERD,  renal disease, thyroid problem hypothyroidism    Musculoskeletal     Abdominal    Substance History      OB/GYN          Other                      Anesthesia Plan    ASA 3     MAC       Anesthetic plan, risks, benefits, and alternatives have been provided, discussed and informed consent has been obtained with: patient.        CODE STATUS:

## 2022-10-27 ENCOUNTER — OFFICE VISIT (OUTPATIENT)
Dept: GASTROENTEROLOGY | Facility: CLINIC | Age: 87
End: 2022-10-27

## 2022-10-27 VITALS
HEIGHT: 64 IN | TEMPERATURE: 96.8 F | BODY MASS INDEX: 30.42 KG/M2 | WEIGHT: 178.2 LBS | DIASTOLIC BLOOD PRESSURE: 80 MMHG | SYSTOLIC BLOOD PRESSURE: 144 MMHG

## 2022-10-27 DIAGNOSIS — K51.30 ULCERATIVE RECTOSIGMOIDITIS WITHOUT COMPLICATION: Primary | Chronic | ICD-10-CM

## 2022-10-27 DIAGNOSIS — K21.9 GASTROESOPHAGEAL REFLUX DISEASE WITHOUT ESOPHAGITIS: Chronic | ICD-10-CM

## 2022-10-27 PROCEDURE — 99214 OFFICE O/P EST MOD 30 MIN: CPT | Performed by: PHYSICIAN ASSISTANT

## 2022-10-27 NOTE — PROGRESS NOTES
Chief Complaint  mucus in stool and ulcerative rectosigmoiditis    Subjective        History of Present Illness  Brittany Villeda is a  87 y.o. female here for follow-up for ulcerative rectosigmoiditis.  She is a patient of Dr. English.  She was last seen in clinic by Dr. English on 9/29/2022 for follow-up for rectal bleeding and black stool. EGD 10/11/2022 with LA grade a esophagitis.  She presents today's appointment accompanied by her daughter.    She reports her bowels have been soft and brown.  Stools are also accompanied by mucus.  No further episodes of black stools or bloody stools.  She also notes a burning sensation in the rectum following bowel movements.    Last colonoscopy completed 10/28/2019: Proctosigmoid ulcerative colitis, sigmoid diverticulosis, nonbleeding internal hemorrhoids, tubular adenoma x2, hyperplastic polyp.  Screening colonoscopy deferred secondary to patient's age.    She is currently taking Apriso 1.5 g daily.      Past Medical History:   Diagnosis Date   • Acute UTI (urinary tract infection) 04/08/2021   • Anemia    • Arrhythmia    • Arthritis    • Asthma    • Bradycardia    • Chest pain    • Choledocholithiasis 05/09/2021   • Colitis    • COPD (chronic obstructive pulmonary disease) ([facility])    • Diastolic dysfunction    • Essential hypertension 05/12/2016   • GERD (gastroesophageal reflux disease)    • Heart block    • Hypertension    • Hypertensive heart disease    • Hyperthyroidism    • Kidney stone    • Leukopenia    • Low back pain    • MGUS (monoclonal gammopathy of unknown significance)    • Nephrolithiasis    • Obesity    • Paroxysmal atrial fibrillation (HCC)    • Peptic ulceration    • Persistent atrial fibrillation (HCC)    • PSVT (paroxysmal supraventricular tachycardia) ([facility])    • Pulmonary hypertension (HCC)    • Rectal bleed    • Systemic hypertension    • Trifascicular block    • 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[facility])    • Ventricular tachycardia      nonsustained   • Vertigo        Past Surgical History:   Procedure Laterality Date   • BACK SURGERY      lumbar fusion   • DUSTY HOLE Left 8/8/2021    Procedure: Left-sided dusty holes for evacuation of subdural hematoma;  Surgeon: Gustavo Callaway MD;  Location: [facility] MAIN OR;  Service: Neurosurgery;  Laterality: Left;   • CARDIAC ELECTROPHYSIOLOGY PROCEDURE N/A 11/7/2018    Procedure: Pacemaker DC new   [place];  Surgeon: Jesus Granda MD;  Location: [facility] CATH INVASIVE LOCATION;  Service: Cardiology   • CHOLECYSTECTOMY     • COLONOSCOPY  06/16/2014    colitis, cryptitis,  tics, NBIH, TA w/low grade dysplasia   • COLONOSCOPY N/A 10/28/2019    Procedure: COLONOSCOPY WITH COLD AND HOT POLYPECTOMIES;  Surgeon: Micaela English MD;  Location: [facility] ENDOSCOPY;  Service: Gastroenterology   • ENDOSCOPY N/A 5/13/2021    Procedure: ESOPHAGOGASTRODUODENOSCOPY with BX;  Surgeon: Stefan Mcfadden MD;  Location: [facility] ENDOSCOPY;  Service: Gastroenterology;  Laterality: N/A;  EPIGASTRIC PAIN  --DUODENAL ULCER, HEMORRHAGIC GASTRITIS, HIATAL HERNIA    • ENDOSCOPY N/A 10/11/2022    Procedure: ESOPHAGOGASTRODUODENOSCOPY;  Surgeon: Micaela English MD;  Location: [facility] ENDOSCOPY;  Service: Gastroenterology;  Laterality: N/A;  PRE- MELENA  POST- ESOPHAGITIS   • HEMORRHOIDECTOMY     • HYSTERECTOMY     • JOINT REPLACEMENT     • KNEE ARTHROPLASTY     • MYOMECTOMY     • PACEMAKER IMPLANTATION     • SHOULDER SURGERY     • SINUS SURGERY     • TOE NAIL AMPUTATION  03/04/2019   • TONSILLECTOMY         Family History   Problem Relation Age of Onset   • Diabetes Mother    • Breast cancer Sister    • Kidney cancer Sister    • Heart disease Sister    • Prostate cancer Brother    • Prostate cancer Brother    • Prostate cancer Brother    • Malig Hyperthermia Neg Hx        Social History     Socioeconomic History   • Marital status:    • Number of children: 10   • Years of education: High School   Tobacco Use   • Smoking  status: Former     Packs/day: 1.50     Years: 10.00     Pack years: 15.00     Types: Cigarettes     Start date:      Quit date:      Years since quittin.8   • Smokeless tobacco: Never   • Tobacco comments:     QUIT SMOKING    Vaping Use   • Vaping Use: Never used   Substance and Sexual Activity   • Alcohol use: No   • Drug use: Never   • Sexual activity: Defer       Allergies   Allergen Reactions   • Codeine Hallucinations     Tolerates hydromorphone   • Amitriptyline Rash   • Amoxicillin-Pot Clavulanate Rash   • Aspirin Unknown - Low Severity     Patient doesn't know why   • Bactrim [Sulfamethoxazole-Trimethoprim] Rash   • Carisoprodol-Aspirin-Codeine Palpitations   • Iodinated Diagnostic Agents Rash   • Latex Rash   • Naproxen Rash   • Nsaids Unknown - Low Severity     unknown   • Soma Compound With Codeine [Carisoprodol-Aspirin-Codeine] Rash   • Sulfa Antibiotics Rash   • Tramadol Palpitations     heart races        Current Outpatient Medications on File Prior to Visit   Medication Sig Dispense Refill   • acetaminophen (TYLENOL) 500 MG tablet Take 1 tablet by mouth Every 6 (Six) Hours As Needed for Mild Pain .     • albuterol sulfate  (90 Base) MCG/ACT inhaler Inhale 2 puffs Every 4 (Four) Hours As Needed for Wheezing (or cough). 18 g 0   • docusate sodium (COLACE) 100 MG capsule Take 100 mg by mouth Every Night.     • ELDERBERRY PO Take 2 tablets by mouth Daily.     • fexofenadine (ALLEGRA) 180 MG tablet Take 180 mg by mouth As Needed.     • furosemide (Lasix) 20 MG tablet Take 1 tablet by mouth Daily. 90 tablet 1   • magnesium oxide (MAG-OX) 400 MG tablet Take 1 tablet by mouth Daily. 90 tablet 1   • mesalamine (APRISO) 0.375 g 24 hr capsule Take 4 capsules by mouth Daily. 360 capsule 3   • mesalamine (CANASA) 1000 MG suppository Insert 1,000 mg into the rectum Every Night.     • pantoprazole (PROTONIX) 40 MG EC tablet TAKE 1 TABLET TWICE DAILY 180 tablet 1   • potassium chloride  "(K-DUR,KLOR-CON) 10 MEQ CR tablet      • potassium chloride 10 MEQ CR tablet Take 1 tablet by mouth Daily. 90 tablet 1   • vitamin B-12 (CYANOCOBALAMIN) 1000 MCG tablet Take 1,000 mcg by mouth Daily.       No current facility-administered medications on file prior to visit.       Review of Systems     Objective   Vital Signs:   /80   Temp 96.8 °F (36 °C)   Ht 162.6 cm (64\")   Wt 80.8 kg (178 lb 3.2 oz)   BMI 30.59 kg/m²       Physical Exam  Vitals and nursing note reviewed.   Constitutional:       General: She is not in acute distress.     Appearance: Normal appearance. She is obese. She is not ill-appearing.   HENT:      Head: Normocephalic and atraumatic.      Right Ear: External ear normal.      Left Ear: External ear normal.   Eyes:      General: No scleral icterus.     Conjunctiva/sclera: Conjunctivae normal.      Pupils: Pupils are equal, round, and reactive to light.   Cardiovascular:      Rate and Rhythm: Rhythm irregular.      Heart sounds: Normal heart sounds.   Pulmonary:      Effort: Pulmonary effort is normal.      Breath sounds: Normal breath sounds.   Abdominal:      General: Abdomen is flat. Bowel sounds are normal. There is no distension.      Palpations: Abdomen is soft.      Tenderness: There is no abdominal tenderness. There is no guarding or rebound.   Musculoskeletal:      Cervical back: Normal range of motion and neck supple.   Skin:     General: Skin is warm and dry.   Neurological:      Mental Status: She is alert and oriented to person, place, and time.   Psychiatric:         Mood and Affect: Mood normal.         Behavior: Behavior normal.          Result Review :       Common labs    Common Labs 9/26/22 9/29/22 10/11/22   WBC 3.10 (A)     Hemoglobin 12.0 11.9 (A) 12.2   Hematocrit 38.5 36.1 37.0   Platelets 123 (A)     (A) Abnormal value                                Assessment and Plan    Diagnoses and all orders for this visit:    1. Ulcerative rectosigmoiditis without " complication (HCC) (Primary)    2. Gastroesophageal reflux disease without esophagitis      · Samples provided for 5 days of Analpram. We also discussed trial of Calmol suppositories.   · High fiber diet.   · Continue pantoprazole 40mg po bid.  · Continue Apriso 1.5g daily.   · Follow up in three months, sooner if necessary.      Follow Up   Return in about 3 months (around 1/27/2023).    Dragon dictation used throughout this note.     BELLO Mclaughlin

## 2022-11-01 ENCOUNTER — HOSPITAL ENCOUNTER (OUTPATIENT)
Facility: HOSPITAL | Age: 87
Discharge: HOME | End: 2022-11-02
Attending: EMERGENCY MEDICINE | Admitting: INTERNAL MEDICINE

## 2022-11-01 ENCOUNTER — APPOINTMENT (OUTPATIENT)
Dept: CT IMAGING | Facility: HOSPITAL | Age: 87
End: 2022-11-01

## 2022-11-01 ENCOUNTER — TELEPHONE (OUTPATIENT)
Dept: GASTROENTEROLOGY | Facility: CLINIC | Age: 87
End: 2022-11-01

## 2022-11-01 DIAGNOSIS — K62.5 GASTROINTESTINAL HEMORRHAGE ASSOCIATED WITH ANORECTAL SOURCE: ICD-10-CM

## 2022-11-01 DIAGNOSIS — K51.30 ULCERATIVE RECTOSIGMOIDITIS WITHOUT COMPLICATION: Primary | ICD-10-CM

## 2022-11-01 DIAGNOSIS — K92.2 LOWER GI BLEED: Primary | ICD-10-CM

## 2022-11-01 DIAGNOSIS — K62.5 RECTAL BLEEDING: ICD-10-CM

## 2022-11-01 PROBLEM — D69.6 THROMBOCYTOPENIA: Chronic | Status: ACTIVE | Noted: 2022-11-01

## 2022-11-01 LAB
ALBUMIN SERPL-MCNC: 3.5 G/DL (ref 3.5–5.2)
ALBUMIN/GLOB SERPL: 1.1 G/DL
ALP SERPL-CCNC: 75 U/L (ref 39–117)
ALT SERPL W P-5'-P-CCNC: 12 U/L (ref 1–33)
ANION GAP SERPL CALCULATED.3IONS-SCNC: 7.1 MMOL/L (ref 5–15)
AST SERPL-CCNC: 16 U/L (ref 1–32)
BASOPHILS # BLD AUTO: 0.01 10*3/MM3 (ref 0–0.2)
BASOPHILS NFR BLD AUTO: 0.3 % (ref 0–1.5)
BILIRUB SERPL-MCNC: 1.4 MG/DL (ref 0–1.2)
BUN SERPL-MCNC: 14 MG/DL (ref 8–23)
BUN/CREAT SERPL: 18.9 (ref 7–25)
CALCIUM SPEC-SCNC: 10.8 MG/DL (ref 8.6–10.5)
CHLORIDE SERPL-SCNC: 106 MMOL/L (ref 98–107)
CO2 SERPL-SCNC: 25.9 MMOL/L (ref 22–29)
CREAT SERPL-MCNC: 0.74 MG/DL (ref 0.57–1)
DEPRECATED RDW RBC AUTO: 45.1 FL (ref 37–54)
EGFRCR SERPLBLD CKD-EPI 2021: 78.4 ML/MIN/1.73
EOSINOPHIL # BLD AUTO: 0.08 10*3/MM3 (ref 0–0.4)
EOSINOPHIL NFR BLD AUTO: 2.2 % (ref 0.3–6.2)
ERYTHROCYTE [DISTWIDTH] IN BLOOD BY AUTOMATED COUNT: 12.7 % (ref 12.3–15.4)
GLOBULIN UR ELPH-MCNC: 3.3 GM/DL
GLUCOSE SERPL-MCNC: 85 MG/DL (ref 65–99)
HCT VFR BLD AUTO: 34.6 % (ref 34–46.6)
HGB BLD-MCNC: 12.2 G/DL (ref 12–15.9)
LIPASE SERPL-CCNC: 19 U/L (ref 13–60)
LYMPHOCYTES # BLD AUTO: 1.61 10*3/MM3 (ref 0.7–3.1)
LYMPHOCYTES NFR BLD AUTO: 44.8 % (ref 19.6–45.3)
MCH RBC QN AUTO: 33.6 PG (ref 26.6–33)
MCHC RBC AUTO-ENTMCNC: 35.3 G/DL (ref 31.5–35.7)
MCV RBC AUTO: 95.3 FL (ref 79–97)
MONOCYTES # BLD AUTO: 0.53 10*3/MM3 (ref 0.1–0.9)
MONOCYTES NFR BLD AUTO: 14.8 % (ref 5–12)
NEUTROPHILS NFR BLD AUTO: 1.35 10*3/MM3 (ref 1.7–7)
NEUTROPHILS NFR BLD AUTO: 37.6 % (ref 42.7–76)
PLATELET # BLD AUTO: 115 10*3/MM3 (ref 140–450)
PMV BLD AUTO: 10.6 FL (ref 6–12)
POTASSIUM SERPL-SCNC: 4.3 MMOL/L (ref 3.5–5.2)
PROT SERPL-MCNC: 6.8 G/DL (ref 6–8.5)
RBC # BLD AUTO: 3.63 10*6/MM3 (ref 3.77–5.28)
SODIUM SERPL-SCNC: 139 MMOL/L (ref 136–145)
WBC NRBC COR # BLD: 3.59 10*3/MM3 (ref 3.4–10.8)

## 2022-11-01 PROCEDURE — 80053 COMPREHEN METABOLIC PANEL: CPT | Performed by: EMERGENCY MEDICINE

## 2022-11-01 PROCEDURE — 99285 EMERGENCY DEPT VISIT HI MDM: CPT

## 2022-11-01 PROCEDURE — G0378 HOSPITAL OBSERVATION PER HR: HCPCS

## 2022-11-01 PROCEDURE — 83690 ASSAY OF LIPASE: CPT | Performed by: EMERGENCY MEDICINE

## 2022-11-01 PROCEDURE — 96365 THER/PROPH/DIAG IV INF INIT: CPT

## 2022-11-01 PROCEDURE — 74176 CT ABD & PELVIS W/O CONTRAST: CPT

## 2022-11-01 PROCEDURE — 85025 COMPLETE CBC W/AUTO DIFF WBC: CPT | Performed by: EMERGENCY MEDICINE

## 2022-11-01 RX ORDER — MESALAMINE 4 G/60ML
4 ENEMA RECTAL SEE ADMIN INSTRUCTIONS
Qty: 180 ML | Refills: 3 | Status: ON HOLD | OUTPATIENT
Start: 2022-11-01 | End: 2022-11-02

## 2022-11-01 RX ORDER — PREDNISONE 50 MG/1
TABLET ORAL
Qty: 3 TABLET | Refills: 0 | Status: ON HOLD | OUTPATIENT
Start: 2022-11-01 | End: 2022-11-02

## 2022-11-01 RX ORDER — NITROGLYCERIN 0.4 MG/1
0.4 TABLET SUBLINGUAL
Status: DISCONTINUED | OUTPATIENT
Start: 2022-11-01 | End: 2022-11-02 | Stop reason: HOSPADM

## 2022-11-01 RX ORDER — SODIUM CHLORIDE 0.9 % (FLUSH) 0.9 %
10 SYRINGE (ML) INJECTION AS NEEDED
Status: DISCONTINUED | OUTPATIENT
Start: 2022-11-01 | End: 2022-11-02 | Stop reason: HOSPADM

## 2022-11-01 RX ADMIN — PANTOPRAZOLE SODIUM 8 MG/HR: 40 INJECTION, POWDER, FOR SOLUTION INTRAVENOUS at 23:51

## 2022-11-01 NOTE — TELEPHONE ENCOUNTER
Called pt's daughter and she reports that her mother is having lots of rectal bleeding and is feeling very weak. She also advises her mother is having severe back pain and is nauseated.  Consulted with Catie MONSALVE and she recommends pt to go ER. ADvised daughter of this and she verb understanding.

## 2022-11-01 NOTE — ED PROVIDER NOTES
EMERGENCY DEPARTMENT ENCOUNTER    Room Number:  09/09  PCP: Mega Esparza MD  Historian: Patient  History Limited By: Nothing      HPI  Chief Complaint: Rectal bleeding  Context: Brittany Villeda is a 87 y.o. female who presents to the ED c/o rectal bleeding.  Patient has had melena in the past as well as distal colitis.  Patient was seen in GI clinic this week.  Was started on a cream.  Patient is unable to use this.  Patient has had increasing abdominal pain and now having increasing bloody diarrhea.  Has had no fevers or chills has had no vomiting.      Location: Lower abdomen  Radiation: None  Character: Aching  Duration: 2 days  Severity: Moderate  Progression: Not improving  Aggravating Factors: Nothing  Alleviating Factors: Nothing        MEDICAL RECORD REVIEW    History of ulcerative rectosigmoiditis.  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   • Leukopenia 09/12/2016   • MGUS (monoclonal gammopathy of unknown significance) 09/16/2016   • 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HCC) 11/30/2017   • Other chest pain 12/24/2017   • Lichen sclerosus 08/23/2017   • Heart block 10/30/2018   • Sleep-related hypoxia 12/22/2018   • Bradycardia 12/29/2018   • Shoulder arthritis 01/28/2019   • History of atrial fibrillation 03/19/2019   • Pacemaker 03/19/2019   • Paroxysmal SVT (supraventricular tachycardia) ([facility]) 05/08/2019   • History of chest pain 05/08/2019   • Palpitations 05/08/2019   • Atrial  fibrillation ([facility]) 10/06/2019   • Rectal bleeding 10/15/2019   • Arthritis 12/13/2019   • Ascending aortic aneurysm 08/24/2020   • Mediastinal lymphadenopathy 09/09/2020   • Immobility 11/20/2020   • Left knee pain 11/20/2020   • Hemarthrosis of left knee 11/20/2020   • Anemia    • Obesity (BMI 30-39.9)    • Generalized weakness 04/08/2021   • Dysautonomia ([facility]) 04/09/2021   • Weakness of both lower extremities 04/09/2021   • Macrocytosis 04/11/2021   • Hypercalcemia 04/11/2021   • Arthritis of right wrist 04/13/2021   • Hyperparathyroidism ([facility]) 04/28/2021   • Choledocholithiasis 05/09/2021   • Essential hypertension 05/10/2021   • Hyperglycemia 05/10/2021   • Epigastric pain 05/12/2021   • Duodenal ulcer 05/13/2021   • Hemorrhagic gastritis 05/13/2021   • Exertional dyspnea 06/22/2021   • COPD (chronic obstructive pulmonary disease) ([facility])    • Functional quadriplegia ([facility]) 11/20/2020   • Hip pain 04/12/2018   • Osteoarthritis of glenohumeral joint 07/12/2018   • Other malaise and fatigue 05/25/2021   • Shoulder pain 04/12/2018   • ICH (intracerebral hemorrhage) ([facility]) 08/05/2021   • Melena 09/29/2022     Resolved Ambulatory Problems     Diagnosis Date Noted   • Abnormal weight loss 05/12/2016   • Tachycardia 05/12/2016   • Nausea and vomiting 05/12/2016   • Hyperthyroidism 05/12/2016   • Fatigue 05/12/2016   • Dizziness 05/12/2016   • Diarrhea 05/12/2016   • Abnormal thyroid stimulating hormone (TSH) level 05/12/2016   • Abdominal pain 05/12/2016   • Abnormal EKG 06/30/2016   • Chronic anticoagulation 11/20/2020   • Acute UTI (urinary tract infection) 04/08/2021   • Spondylosis of lumbosacral region without myelopathy or radiculopathy 04/09/2021   • Hypomagnesemia 04/13/2021   • Subdural hematoma 08/05/2021     Past Medical History:   Diagnosis Date   • Arrhythmia    • Asthma    • Chest pain    • Colitis    • GERD (gastroesophageal reflux disease)    • Hypertension    • Hypertensive heart disease    • Kidney stone     • Low back pain    • Nephrolithiasis    • Obesity    • Paroxysmal atrial fibrillation (HCC)    • Peptic ulceration    • Persistent atrial fibrillation (HCC)    • PSVT (paroxysmal supraventricular tachycardia) (HCC)    • Rectal bleed    • Systemic hypertension    • Ventricular tachycardia    • Vertigo          PAST SURGICAL HISTORY  Past Surgical History:   Procedure Laterality Date   • BACK SURGERY      lumbar fusion   • DUSTY HOLE Left 8/8/2021    Procedure: Left-sided dusty holes for evacuation of subdural hematoma;  Surgeon: Gustavo Callaway MD;  Location: [facility] MAIN OR;  Service: Neurosurgery;  Laterality: Left;   • CARDIAC ELECTROPHYSIOLOGY PROCEDURE N/A 11/7/2018    Procedure: Pacemaker DC new   [place];  Surgeon: Jesus Granda MD;  Location: [facility] CATH INVASIVE LOCATION;  Service: Cardiology   • CHOLECYSTECTOMY     • COLONOSCOPY  06/16/2014    colitis, cryptitis,  tics, NBIH, TA w/low grade dysplasia   • COLONOSCOPY N/A 10/28/2019    Procedure: COLONOSCOPY WITH COLD AND HOT POLYPECTOMIES;  Surgeon: Micaela English MD;  Location: [facility] ENDOSCOPY;  Service: Gastroenterology   • ENDOSCOPY N/A 5/13/2021    Procedure: ESOPHAGOGASTRODUODENOSCOPY with BX;  Surgeon: Stefan Mcfadden MD;  Location: [facility] ENDOSCOPY;  Service: Gastroenterology;  Laterality: N/A;  EPIGASTRIC PAIN  --DUODENAL ULCER, HEMORRHAGIC GASTRITIS, HIATAL HERNIA    • ENDOSCOPY N/A 10/11/2022    Procedure: ESOPHAGOGASTRODUODENOSCOPY;  Surgeon: Micaela English MD;  Location: [facility] ENDOSCOPY;  Service: Gastroenterology;  Laterality: N/A;  PRE- MELENA  POST- ESOPHAGITIS   • HEMORRHOIDECTOMY     • HYSTERECTOMY     • JOINT REPLACEMENT     • KNEE ARTHROPLASTY     • MYOMECTOMY     • PACEMAKER IMPLANTATION     • SHOULDER SURGERY     • SINUS SURGERY     • TOE NAIL AMPUTATION  03/04/2019   • TONSILLECTOMY           FAMILY HISTORY  Family History   Problem Relation Age of Onset   • Diabetes Mother    • Breast cancer Sister    • Kidney  cancer Sister    • Heart disease Sister    • Prostate cancer Brother    • Prostate cancer Brother    • Prostate cancer Brother    • Malig Hyperthermia Neg Hx          SOCIAL HISTORY  Social History     Socioeconomic History   • Marital status:    • Number of children: 10   • Years of education: High School   Tobacco Use   • Smoking status: Former     Packs/day: 1.50     Years: 10.00     Pack years: 15.00     Types: Cigarettes     Start date:      Quit date:      Years since quittin.8   • Smokeless tobacco: Never   • Tobacco comments:     QUIT SMOKING    Vaping Use   • Vaping Use: Never used   Substance and Sexual Activity   • Alcohol use: No   • Drug use: Never   • Sexual activity: Defer         ALLERGIES  Codeine, Amitriptyline, Amoxicillin-pot clavulanate, Aspirin, Bactrim [sulfamethoxazole-trimethoprim], Carisoprodol-aspirin-codeine, Iodinated diagnostic agents, Latex, Naproxen, Nsaids, Soma compound with codeine [carisoprodol-aspirin-codeine], Sulfa antibiotics, and Tramadol        REVIEW OF SYSTEMS  Review of Systems   Constitutional: Negative for fever.   HENT: Negative for sore throat.    Eyes: Negative.    Respiratory: Negative for cough and shortness of breath.    Cardiovascular: Negative for chest pain.   Gastrointestinal: Positive for blood in stool and diarrhea. Negative for abdominal pain and vomiting.   Genitourinary: Negative for dysuria.   Musculoskeletal: Negative for neck pain.   Skin: Negative for rash.   Allergic/Immunologic: Negative.    Neurological: Negative for weakness, numbness and headaches.   Hematological: Negative.    Psychiatric/Behavioral: Negative.    All other systems reviewed and are negative.           PHYSICAL EXAM  ED Triage Vitals [22 1734]   Temp Heart Rate Resp BP SpO2   97.4 °F (36.3 °C) 62 18 112/56 98 %      Temp src Heart Rate Source Patient Position BP Location FiO2 (%)   -- -- -- -- --       Physical Exam  Vitals and nursing note reviewed.    Constitutional:       General: She is not in acute distress.  HENT:      Head: Normocephalic and atraumatic.   Eyes:      Extraocular Movements: EOM normal.      Pupils: Pupils are equal, round, and reactive to light.   Cardiovascular:      Rate and Rhythm: Normal rate and regular rhythm.      Heart sounds: Normal heart sounds.   Pulmonary:      Effort: Pulmonary effort is normal. No respiratory distress.      Breath sounds: Normal breath sounds.   Abdominal:      Palpations: Abdomen is soft.      Tenderness: There is abdominal tenderness. There is no guarding or rebound.      Comments: Left lower quadrant   Musculoskeletal:         General: No edema. Normal range of motion.      Cervical back: Normal range of motion and neck supple.   Skin:     General: Skin is warm and dry.      Findings: No rash.   Neurological:      Mental Status: She is alert and oriented to person, place, and time.      Sensory: Sensation is intact.      Motor: Motor strength is normal.   Psychiatric:         Mood and Affect: Mood and affect normal.       Patient was wearing a face mask when I entered the room and they continued to wear a mask throughout their stay in the ED.  I wore PPE, including  gloves, face mask with shield or face mask with goggles whenever I was in the room with patient.       LAB RESULTS  Recent Results (from the past 24 hour(s))   Lipase    Collection Time: 11/01/22  6:10 PM    Specimen: Blood   Result Value Ref Range    Lipase 19 13 - 60 U/L   CBC Auto Differential    Collection Time: 11/01/22  6:10 PM    Specimen: Blood   Result Value Ref Range    WBC 3.59 3.40 - 10.80 10*3/mm3    RBC 3.63 (L) 3.77 - 5.28 10*6/mm3    Hemoglobin 12.2 12.0 - 15.9 g/dL    Hematocrit 34.6 34.0 - 46.6 %    MCV 95.3 79.0 - 97.0 fL    MCH 33.6 (H) 26.6 - 33.0 pg    MCHC 35.3 31.5 - 35.7 g/dL    RDW 12.7 12.3 - 15.4 %    RDW-SD 45.1 37.0 - 54.0 fl    MPV 10.6 6.0 - 12.0 fL    Platelets 115 (L) 140 - 450 10*3/mm3    Neutrophil % 37.6 (L)  42.7 - 76.0 %    Lymphocyte % 44.8 19.6 - 45.3 %    Monocyte % 14.8 (H) 5.0 - 12.0 %    Eosinophil % 2.2 0.3 - 6.2 %    Basophil % 0.3 0.0 - 1.5 %    Neutrophils, Absolute 1.35 (L) 1.70 - 7.00 10*3/mm3    Lymphocytes, Absolute 1.61 0.70 - 3.10 10*3/mm3    Monocytes, Absolute 0.53 0.10 - 0.90 10*3/mm3    Eosinophils, Absolute 0.08 0.00 - 0.40 10*3/mm3    Basophils, Absolute 0.01 0.00 - 0.20 10*3/mm3   Comprehensive Metabolic Panel    Collection Time: 11/01/22  8:32 PM    Specimen: Blood   Result Value Ref Range    Glucose 85 65 - 99 mg/dL    BUN 14 8 - 23 mg/dL    Creatinine 0.74 0.57 - 1.00 mg/dL    Sodium 139 136 - 145 mmol/L    Potassium 4.3 3.5 - 5.2 mmol/L    Chloride 106 98 - 107 mmol/L    CO2 25.9 22.0 - 29.0 mmol/L    Calcium 10.8 (H) 8.6 - 10.5 mg/dL    Total Protein 6.8 6.0 - 8.5 g/dL    Albumin 3.50 3.50 - 5.20 g/dL    ALT (SGPT) 12 1 - 33 U/L    AST (SGOT) 16 1 - 32 U/L    Alkaline Phosphatase 75 39 - 117 U/L    Total Bilirubin 1.4 (H) 0.0 - 1.2 mg/dL    Globulin 3.3 gm/dL    A/G Ratio 1.1 g/dL    BUN/Creatinine Ratio 18.9 7.0 - 25.0    Anion Gap 7.1 5.0 - 15.0 mmol/L    eGFR 78.4 >60.0 mL/min/1.73       Ordered the above labs and reviewed the results.        RADIOLOGY  CT Abdomen Pelvis Without Contrast   Final Result           1. Colonic diverticulosis. No focal acute inflammatory process of bowel   is identified, follow up as indications persist.   2. No urolithiasis or hydronephrosis.       This report was finalized on 11/1/2022 7:14 PM by Dr. Nas Maharaj M.D.               Ordered the above noted radiological studies. Reviewed by me in PACS.          PROCEDURES  Procedures          MEDICATIONS GIVEN IN ER  Medications   sodium chloride 0.9 % flush 10 mL (has no administration in time range)   nitroglycerin (NITROSTAT) SL tablet 0.4 mg (has no administration in time range)   pantoprazole (PROTONIX) 40 mg in 100 mL NS (VTB) (has no administration in time range)             PROGRESS AND  CONSULTS  ED Course as of 11/01/22 2149   Tue Nov 01, 2022 2144 21:44 EDT  Patient presents for lower GI bleeding.  Patient has had several episodes of lower GI bleeding today.  Patient has had no vomiting.  Patient CT scan shows no evidence of colitis.  Discussed options with patient and she is uncomfortable with discharge.  Patient discussed with "[employer] BRENT with [facility] and will be admitted. [SL]      ED Course User Index  [SL] Mark Burnett MD           MEDICAL DECISION MAKING      MDM  Number of Diagnoses or Management Options     Amount and/or Complexity of Data Reviewed  Clinical lab tests: reviewed and ordered (White blood cell count 3)  Tests in the radiology section of CPT®: reviewed and ordered (CT scan negative for colitis)  Discuss the patient with other providers: yes (Discussed with "[employer] BRENT with [facility])               DIAGNOSIS  Final diagnoses:   Lower GI bleed           DISPOSITION  admit        Latest Documented Vital Signs:  As of 21:49 EDT  BP- 129/64 HR- 70 Temp- 97.4 °F (36.3 °C) O2 sat- 98%                         Mark Burnett MD  11/01/22 2150

## 2022-11-01 NOTE — ED NOTES
Pt via EMS Jovana Sheridan from home with c/o rectal bleeding x2 days, and LLQ pain x4 hours.    All triage performed with this RN wearing appropriate PPE.  Pt placed in mask upon arrival to ED.

## 2022-11-01 NOTE — TELEPHONE ENCOUNTER
----- Message from BELLO Mclaughlin sent at 11/1/2022  8:13 AM EDT -----  Regarding: FW: Blood in stool  Contact: [phone]  An Xray would be of no benefit and in regards to MRI, she has a pacemaker.  I have ordered for her to begin mesalamine enemas and also ordered a CT scan. Given her contrast allergy, she will require premedication. I have submitted the prescription fo  r prednisone to [facility]. She will also need to take 25mg Benadryl instead of 50mg given her age. Please give the instructions to her regarding premedication dosing prior to her CT.          ----- Message -----  From: Catherine Bravo RN  Sent: 10/31/2022   4:15 PM EDT  To: BELLO Mclaughlin  Subject: FW: Blood in stool                                 ----- Message -----  From: Brittany Villeda  Sent: 10/31/2022   1:28 PM EDT  To: [facility]  Subject: Blood in stool                                   Dr Haddad the suppository cream you gave Mommy is very difficult for her to administer. She is still seeing blood in her stool not much but it's there.   She has had pain in her lower back for sometimes now.   I'm asking for further investigation maybe Exray,MRI or something to this nature to get to the bottom of whats going on.

## 2022-11-02 ENCOUNTER — ANESTHESIA (OUTPATIENT)
Dept: GASTROENTEROLOGY | Facility: HOSPITAL | Age: 87
End: 2022-11-02

## 2022-11-02 ENCOUNTER — READMISSION MANAGEMENT (OUTPATIENT)
Dept: CALL CENTER | Facility: HOSPITAL | Age: 87
End: 2022-11-02

## 2022-11-02 ENCOUNTER — ANESTHESIA EVENT (OUTPATIENT)
Dept: GASTROENTEROLOGY | Facility: HOSPITAL | Age: 87
End: 2022-11-02

## 2022-11-02 VITALS
HEIGHT: 64 IN | HEART RATE: 70 BPM | RESPIRATION RATE: 16 BRPM | WEIGHT: 177.47 LBS | SYSTOLIC BLOOD PRESSURE: 126 MMHG | TEMPERATURE: 96.8 F | DIASTOLIC BLOOD PRESSURE: 69 MMHG | OXYGEN SATURATION: 99 % | BODY MASS INDEX: 30.3 KG/M2

## 2022-11-02 LAB
ABO GROUP BLD: NORMAL
ANION GAP SERPL CALCULATED.3IONS-SCNC: 8 MMOL/L (ref 5–15)
APTT PPP: 38.3 SECONDS (ref 22.7–35.4)
B PARAPERT DNA SPEC QL NAA+PROBE: NOT DETECTED
B PERT DNA SPEC QL NAA+PROBE: NOT DETECTED
BASOPHILS # BLD AUTO: 0.02 10*3/MM3 (ref 0–0.2)
BASOPHILS NFR BLD AUTO: 0.6 % (ref 0–1.5)
BLD GP AB SCN SERPL QL: NEGATIVE
BUN SERPL-MCNC: 11 MG/DL (ref 8–23)
BUN/CREAT SERPL: 14.5 (ref 7–25)
C DIFF TOX GENS STL QL NAA+PROBE: NEGATIVE
C PNEUM DNA NPH QL NAA+NON-PROBE: NOT DETECTED
CALCIUM SPEC-SCNC: 10.1 MG/DL (ref 8.6–10.5)
CHLORIDE SERPL-SCNC: 105 MMOL/L (ref 98–107)
CO2 SERPL-SCNC: 25 MMOL/L (ref 22–29)
CREAT SERPL-MCNC: 0.76 MG/DL (ref 0.57–1)
DEPRECATED RDW RBC AUTO: 46.7 FL (ref 37–54)
EGFRCR SERPLBLD CKD-EPI 2021: 75.9 ML/MIN/1.73
EOSINOPHIL # BLD AUTO: 0.07 10*3/MM3 (ref 0–0.4)
EOSINOPHIL NFR BLD AUTO: 2.2 % (ref 0.3–6.2)
ERYTHROCYTE [DISTWIDTH] IN BLOOD BY AUTOMATED COUNT: 12.8 % (ref 12.3–15.4)
FLUAV SUBTYP SPEC NAA+PROBE: NOT DETECTED
FLUBV RNA ISLT QL NAA+PROBE: NOT DETECTED
GLUCOSE SERPL-MCNC: 99 MG/DL (ref 65–99)
HADV DNA SPEC NAA+PROBE: NOT DETECTED
HCOV 229E RNA SPEC QL NAA+PROBE: NOT DETECTED
HCOV HKU1 RNA SPEC QL NAA+PROBE: NOT DETECTED
HCOV NL63 RNA SPEC QL NAA+PROBE: NOT DETECTED
HCOV OC43 RNA SPEC QL NAA+PROBE: NOT DETECTED
HCT VFR BLD AUTO: 35.3 % (ref 34–46.6)
HGB BLD-MCNC: 11.6 G/DL (ref 12–15.9)
HGB BLD-MCNC: 12 G/DL (ref 12–15.9)
HMPV RNA NPH QL NAA+NON-PROBE: NOT DETECTED
HPIV1 RNA ISLT QL NAA+PROBE: NOT DETECTED
HPIV2 RNA SPEC QL NAA+PROBE: NOT DETECTED
HPIV3 RNA NPH QL NAA+PROBE: NOT DETECTED
HPIV4 P GENE NPH QL NAA+PROBE: NOT DETECTED
INR PPP: 1.2 (ref 0.9–1.1)
LYMPHOCYTES # BLD AUTO: 1.37 10*3/MM3 (ref 0.7–3.1)
LYMPHOCYTES NFR BLD AUTO: 42.3 % (ref 19.6–45.3)
M PNEUMO IGG SER IA-ACNC: NOT DETECTED
MCH RBC QN AUTO: 33.9 PG (ref 26.6–33)
MCHC RBC AUTO-ENTMCNC: 34 G/DL (ref 31.5–35.7)
MCV RBC AUTO: 99.7 FL (ref 79–97)
MONOCYTES # BLD AUTO: 0.56 10*3/MM3 (ref 0.1–0.9)
MONOCYTES NFR BLD AUTO: 17.3 % (ref 5–12)
NEUTROPHILS NFR BLD AUTO: 1.21 10*3/MM3 (ref 1.7–7)
NEUTROPHILS NFR BLD AUTO: 37.3 % (ref 42.7–76)
PLATELET # BLD AUTO: 113 10*3/MM3 (ref 140–450)
PMV BLD AUTO: 9.6 FL (ref 6–12)
POTASSIUM SERPL-SCNC: 4.8 MMOL/L (ref 3.5–5.2)
PROTHROMBIN TIME: 15.3 SECONDS (ref 11.7–14.2)
RBC # BLD AUTO: 3.54 10*6/MM3 (ref 3.77–5.28)
RH BLD: POSITIVE
RHINOVIRUS RNA SPEC NAA+PROBE: NOT DETECTED
RSV RNA NPH QL NAA+NON-PROBE: NOT DETECTED
SARS-COV-2 RNA NPH QL NAA+NON-PROBE: NOT DETECTED
SODIUM SERPL-SCNC: 138 MMOL/L (ref 136–145)
T&S EXPIRATION DATE: NORMAL
WBC NRBC COR # BLD: 3.24 10*3/MM3 (ref 3.4–10.8)

## 2022-11-02 PROCEDURE — 25010000002 PROPOFOL 10 MG/ML EMULSION: Performed by: ANESTHESIOLOGY

## 2022-11-02 PROCEDURE — 0202U NFCT DS 22 TRGT SARS-COV-2: CPT | Performed by: NURSE PRACTITIONER

## 2022-11-02 PROCEDURE — 45331 SIGMOIDOSCOPY AND BIOPSY: CPT | Performed by: INTERNAL MEDICINE

## 2022-11-02 PROCEDURE — 63710000001 PANTOPRAZOLE 40 MG TABLET DELAYED-RELEASE: Performed by: INTERNAL MEDICINE

## 2022-11-02 PROCEDURE — 86900 BLOOD TYPING SEROLOGIC ABO: CPT | Performed by: NURSE PRACTITIONER

## 2022-11-02 PROCEDURE — G0378 HOSPITAL OBSERVATION PER HR: HCPCS

## 2022-11-02 PROCEDURE — 96366 THER/PROPH/DIAG IV INF ADDON: CPT

## 2022-11-02 PROCEDURE — 90662 IIV NO PRSV INCREASED AG IM: CPT | Performed by: INTERNAL MEDICINE

## 2022-11-02 PROCEDURE — 85025 COMPLETE CBC W/AUTO DIFF WBC: CPT | Performed by: NURSE PRACTITIONER

## 2022-11-02 PROCEDURE — 99214 OFFICE O/P EST MOD 30 MIN: CPT | Performed by: INTERNAL MEDICINE

## 2022-11-02 PROCEDURE — A9270 NON-COVERED ITEM OR SERVICE: HCPCS | Performed by: INTERNAL MEDICINE

## 2022-11-02 PROCEDURE — 25010000002 INFLUENZA VAC HIGH-DOSE QUAD 0.7 ML SUSPENSION PREFILLED SYRINGE: Performed by: INTERNAL MEDICINE

## 2022-11-02 PROCEDURE — 36415 COLL VENOUS BLD VENIPUNCTURE: CPT | Performed by: NURSE PRACTITIONER

## 2022-11-02 PROCEDURE — 87493 C DIFF AMPLIFIED PROBE: CPT | Performed by: INTERNAL MEDICINE

## 2022-11-02 PROCEDURE — 88305 TISSUE EXAM BY PATHOLOGIST: CPT | Performed by: INTERNAL MEDICINE

## 2022-11-02 PROCEDURE — 85730 THROMBOPLASTIN TIME PARTIAL: CPT | Performed by: NURSE PRACTITIONER

## 2022-11-02 PROCEDURE — 80048 BASIC METABOLIC PNL TOTAL CA: CPT | Performed by: NURSE PRACTITIONER

## 2022-11-02 PROCEDURE — 63710000001 PREDNISONE PER 1 MG: Performed by: INTERNAL MEDICINE

## 2022-11-02 PROCEDURE — 63710000001 HYDROCORTISONE 25 MG SUPPOSITORY: Performed by: HOSPITALIST

## 2022-11-02 PROCEDURE — G0008 ADMIN INFLUENZA VIRUS VAC: HCPCS | Performed by: INTERNAL MEDICINE

## 2022-11-02 PROCEDURE — 86901 BLOOD TYPING SEROLOGIC RH(D): CPT | Performed by: NURSE PRACTITIONER

## 2022-11-02 PROCEDURE — 85610 PROTHROMBIN TIME: CPT | Performed by: NURSE PRACTITIONER

## 2022-11-02 PROCEDURE — 86850 RBC ANTIBODY SCREEN: CPT | Performed by: NURSE PRACTITIONER

## 2022-11-02 PROCEDURE — 85018 HEMOGLOBIN: CPT | Performed by: NURSE PRACTITIONER

## 2022-11-02 PROCEDURE — A9270 NON-COVERED ITEM OR SERVICE: HCPCS | Performed by: HOSPITALIST

## 2022-11-02 RX ORDER — PANTOPRAZOLE SODIUM 40 MG/1
40 TABLET, DELAYED RELEASE ORAL
Status: DISCONTINUED | OUTPATIENT
Start: 2022-11-02 | End: 2022-11-02 | Stop reason: HOSPADM

## 2022-11-02 RX ORDER — LIDOCAINE HYDROCHLORIDE 20 MG/ML
INJECTION, SOLUTION INFILTRATION; PERINEURAL AS NEEDED
Status: DISCONTINUED | OUTPATIENT
Start: 2022-11-02 | End: 2022-11-02 | Stop reason: SURG

## 2022-11-02 RX ORDER — PROPOFOL 10 MG/ML
VIAL (ML) INTRAVENOUS CONTINUOUS PRN
Status: DISCONTINUED | OUTPATIENT
Start: 2022-11-02 | End: 2022-11-02 | Stop reason: SURG

## 2022-11-02 RX ORDER — SODIUM CHLORIDE, SODIUM LACTATE, POTASSIUM CHLORIDE, CALCIUM CHLORIDE 600; 310; 30; 20 MG/100ML; MG/100ML; MG/100ML; MG/100ML
INJECTION, SOLUTION INTRAVENOUS CONTINUOUS PRN
Status: DISCONTINUED | OUTPATIENT
Start: 2022-11-02 | End: 2022-11-02 | Stop reason: SURG

## 2022-11-02 RX ORDER — PREDNISONE 10 MG/1
TABLET ORAL
Qty: 50 TABLET | Refills: 0 | Status: SHIPPED | OUTPATIENT
Start: 2022-11-02 | End: 2022-11-16

## 2022-11-02 RX ORDER — PROPOFOL 10 MG/ML
VIAL (ML) INTRAVENOUS AS NEEDED
Status: DISCONTINUED | OUTPATIENT
Start: 2022-11-02 | End: 2022-11-02 | Stop reason: SURG

## 2022-11-02 RX ORDER — HYDROCORTISONE ACETATE 25 MG/1
25 SUPPOSITORY RECTAL 2 TIMES DAILY
Status: DISCONTINUED | OUTPATIENT
Start: 2022-11-02 | End: 2022-11-02 | Stop reason: HOSPADM

## 2022-11-02 RX ORDER — PREDNISONE 20 MG/1
40 TABLET ORAL
Status: DISCONTINUED | OUTPATIENT
Start: 2022-11-02 | End: 2022-11-02 | Stop reason: HOSPADM

## 2022-11-02 RX ORDER — MESALAMINE 1000 MG/1
1000 SUPPOSITORY RECTAL NIGHTLY
COMMUNITY
End: 2022-11-02 | Stop reason: HOSPADM

## 2022-11-02 RX ORDER — EPHEDRINE SULFATE 50 MG/ML
INJECTION, SOLUTION INTRAVENOUS AS NEEDED
Status: DISCONTINUED | OUTPATIENT
Start: 2022-11-02 | End: 2022-11-02 | Stop reason: SURG

## 2022-11-02 RX ADMIN — LIDOCAINE HYDROCHLORIDE 60 MG: 20 INJECTION, SOLUTION INFILTRATION; PERINEURAL at 10:57

## 2022-11-02 RX ADMIN — EPHEDRINE SULFATE 5 MG: 50 INJECTION INTRAVENOUS at 11:17

## 2022-11-02 RX ADMIN — PROPOFOL 80 MG: 10 INJECTION, EMULSION INTRAVENOUS at 10:58

## 2022-11-02 RX ADMIN — HYDROCORTISONE ACETATE 25 MG: 25 SUPPOSITORY RECTAL at 08:06

## 2022-11-02 RX ADMIN — INFLUENZA A VIRUS A/VICTORIA/2570/2019 IVR-215 (H1N1) ANTIGEN (FORMALDEHYDE INACTIVATED), INFLUENZA A VIRUS A/DARWIN/9/2021 SAN-010 (H3N2) ANTIGEN (FORMALDEHYDE INACTIVATED), INFLUENZA B VIRUS B/PHUKET/3073/2013 ANTIGEN (FORMALDEHYDE INACTIVATED), AND INFLUENZA B VIRUS B/MICHIGAN/01/2021 ANTIGEN (FORMALDEHYDE INACTIVATED) 0.7 ML: 60; 60; 60; 60 INJECTION, SUSPENSION INTRAMUSCULAR at 17:07

## 2022-11-02 RX ADMIN — SODIUM CHLORIDE, POTASSIUM CHLORIDE, SODIUM LACTATE AND CALCIUM CHLORIDE: 600; 310; 30; 20 INJECTION, SOLUTION INTRAVENOUS at 10:55

## 2022-11-02 RX ADMIN — Medication 300 MCG/KG/MIN: at 10:58

## 2022-11-02 RX ADMIN — PANTOPRAZOLE SODIUM 40 MG: 40 TABLET, DELAYED RELEASE ORAL at 08:06

## 2022-11-02 RX ADMIN — PREDNISONE 40 MG: 20 TABLET ORAL at 14:10

## 2022-11-02 NOTE — NURSING NOTE
Nursing report ED to floor  Brittany Villeda  87 y.o.  female    HPI :   Chief Complaint   Patient presents with   • Rectal Bleeding       Admitting doctor:   Ash Seo MD    Admitting diagnosis:   The encounter diagnosis was Lower GI bleed.    Code status:   Current Code Status     Date Active Code Status Order ID Comments User Context       11/1/2022 2138 CPR (Attempt to Resuscitate) [id number]  Deb Lynn, [facility]      Question Answer    Code Status (Patient has no pulse and is not breathing) CPR (Attempt to Resuscitate)    Medical Interventions (Patient has pulse or is breathing) Full Support                Allergies:   Codeine, Amitriptyline, Amoxicillin-pot clavulanate, Aspirin, Bactrim [sulfamethoxazole-trimethoprim], Carisoprodol-aspirin-codeine, Iodinated diagnostic agents, Latex, Naproxen, Nsaids, Soma compound with codeine [carisoprodol-aspirin-codeine], Sulfa antibiotics, and Tramadol    Isolation:   No active isolations    Intake and Output  No intake or output data in the 24 hours ending 11/01/22 2147    Weight:   There were no vitals filed for this visit.    Most recent vitals:   Vitals:    11/01/22 1734 11/01/22 1816   BP: 112/56 129/64   Pulse: 62 70   Resp: 18    Temp: 97.4 °F (36.3 °C)    SpO2: 98% 98%       Active LDAs/IV Access:   Lines, Drains & Airways     Active LDAs     Name Placement date Placement time Site Days    Peripheral IV 11/01/22 1810 Anterior;Left Forearm 11/01/22 1810  Forearm  less than 1                Labs (abnormal labs have a star):   Labs Reviewed   COMPREHENSIVE METABOLIC PANEL - Abnormal; Notable for the following components:       Result Value    Calcium 10.8 (*)     Total Bilirubin 1.4 (*)     All other components within normal limits    Narrative:     GFR Normal >60  Chronic Kidney Disease <60  Kidney Failure <15    The GFR formula is only valid for adults with stable renal function between ages 18 and 70.   CBC WITH AUTO DIFFERENTIAL -  Abnormal; Notable for the following components:    RBC 3.63 (*)     MCH 33.6 (*)     Platelets 115 (*)     Neutrophil % 37.6 (*)     Monocyte % 14.8 (*)     Neutrophils, Absolute 1.35 (*)     All other components within normal limits   LIPASE - Normal   HEMOGLOBIN   HEMOGLOBIN   TYPE AND SCREEN   CBC AND DIFFERENTIAL    Narrative:     The following orders were created for panel order CBC & Differential.  Procedure                               Abnormality         Status                     ---------                               -----------         ------                     CBC Auto Differential[[id number]]        Abnormal            Final result                 Please view results for these tests on the individual orders.       EKG:   No orders to display       Meds given in ED:   Medications   sodium chloride 0.9 % flush 10 mL (has no administration in time range)   nitroglycerin (NITROSTAT) SL tablet 0.4 mg (has no administration in time range)   pantoprazole (PROTONIX) 40 mg in 100 mL NS (VTB) (has no administration in time range)       Imaging results:  CT Abdomen Pelvis Without Contrast    Result Date: 2022    1. Colonic diverticulosis. No focal acute inflammatory process of bowel is identified, follow up as indications persist. 2. No urolithiasis or hydronephrosis.  This report was finalized on 2022 7:14 PM by Dr. Nas Maharaj M.D.        Ambulatory status:   - br    Social issues:   Social History     Socioeconomic History   • Marital status:    • Number of children: 10   • Years of education: High School   Tobacco Use   • Smoking status: Former     Packs/day: 1.50     Years: 10.00     Pack years: 15.00     Types: Cigarettes     Start date:      Quit date:      Years since quittin.8   • Smokeless tobacco: Never   • Tobacco comments:     QUIT SMOKING    Vaping Use   • Vaping Use: Never used   Substance and Sexual Activity   • Alcohol use: No   • Drug use: Never   •  Sexual activity: Defer       NIH Stroke Scale:         Roberto Carlos Cevallos RN  11/01/22 21:47 EDT

## 2022-11-02 NOTE — DISCHARGE SUMMARY
[facility]    Date of Discharge:  11/2/2022    PCP: Mega Esparza MD    Discharge Diagnosis:   Active Hospital Problems    Diagnosis  POA   • **Lower GI bleed [K92.2]  Yes   • Thrombocytopenia (HCC) [D69.6]  Yes   • GI bleed [K92.2]  Unknown   • ICH (intracerebral hemorrhage) (HCC) [I61.9]  Yes   • COPD (chronic obstructive pulmonary disease) (HCC) [J44.9]  Yes   • Atrial fibrillation (HCC) [I48.91]  Yes   • Essential hypertension [I10]  Yes      Resolved Hospital Problems   No resolved problems to display.     Procedure(s):  SIGMOIDOSCOPY FLEXIBLE WITH COLD BIOPSIES AND ASPIRATE  11/02 1053 FLEXIBLE SIGMOIDOSCOPY  Consults     Date and Time Order Name Status Description    11/1/2022  9:38 PM Inpatient Gastroenterology Consult Completed     11/1/2022  9:08 PM [facility] (on-call MD unless specified) Details          Hospital Course  87 y.o. female with a history of ulcerative proctosigmoiditis admitted with rectal bleeding. 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 Inflammation was found from rectum to sigmoid colon, mild but worse than previous. GI recommended tapering prednisone and Apriso. They cleared her for discharge and will see her in a week or two.    I discussed the patient's findings and my recommendations with patient, family and nursing staff.    Condition on Discharge: Improved. Patient would very much like to go home today.    Temp:  [96.8 °F (36 °C)-98.2 °F (36.8 °C)] 96.8 °F (36 °C)  Heart Rate:  [66-76] 70  Resp:  [16-18] 16  BP: ()/(45-81) 126/69  Body mass index is 30.45 kg/m².    Physical Exam  Constitutional:       General: She is not in acute distress.     Appearance: Normal appearance. She is not toxic-appearing.   HENT:      Head: Normocephalic and atraumatic.   Cardiovascular:      Rate and Rhythm: Normal rate and regular rhythm.   Pulmonary:      Effort: Pulmonary effort is normal. No respiratory distress.       Breath sounds: Normal breath sounds.   Abdominal:      General: Bowel sounds are normal.      Palpations: Abdomen is soft.      Tenderness: There is no abdominal tenderness. There is no guarding or rebound.   Musculoskeletal:         General: No swelling.   Skin:     General: Skin is warm and dry.   Neurological:      General: No focal deficit present.      Mental Status: She is alert.       Disposition: Home or Self Care       Discharge Medications      New Medications      Instructions Start Date   predniSONE 10 MG tablet  Commonly known as: DELTASONE   Take 4 tablets by mouth Daily for 5 days, THEN 3 tablets Daily for 5 days, THEN 2 tablets Daily for 5 days, THEN 1 tablet Daily for 5 days.   Start Date: November 2, 2022        Changes to Medications      Instructions Start Date   mesalamine 0.375 g 24 hr capsule  Commonly known as: APRISO  What changed: Another medication with the same name was removed. Continue taking this medication, and follow the directions you see here.   1.5 g, Oral, Daily         Continue These Medications      Instructions Start Date   acetaminophen 500 MG tablet  Commonly known as: TYLENOL   500 mg, Oral, Every 6 Hours PRN      albuterol sulfate  (90 Base) MCG/ACT inhaler  Commonly known as: PROVENTIL HFA;VENTOLIN HFA;PROAIR HFA   2 puffs, Inhalation, Every 4 Hours PRN      docusate sodium 100 MG capsule  Commonly known as: COLACE   100 mg, Oral, Nightly PRN      ELDERBERRY PO   2 tablets, Oral, Daily      magnesium oxide 400 MG tablet  Commonly known as: MAG-OX   400 mg, Oral, Daily      pantoprazole 40 MG EC tablet  Commonly known as: PROTONIX   TAKE 1 TABLET TWICE DAILY      potassium chloride 10 MEQ CR tablet   10 mEq, Oral, Daily      vitamin B-12 1000 MCG tablet  Commonly known as: CYANOCOBALAMIN   1,000 mcg, Oral, Daily         Stop These Medications    potassium chloride 10 MEQ CR tablet  Commonly known as: K-DUR,KLOR-CON           Diet Instructions     Diet: Regular       Discharge Diet: Regular         Activity Instructions     Activity as Tolerated           Additional Instructions for the Follow-ups that You Need to Schedule     Discharge Follow-up with PCP   As directed       Currently Documented PCP:    Mega Esparza MD    PCP Phone Number:    [phone]     Follow Up Details: see in 1 week         Discharge Follow-up with Specified Provider: Dr. English   As directed      To: Dr. English    Follow Up Details: 1-2 weeks            Follow-up Information     Mega Esparza MD .    Specialty: Family Medicine  Why: see in 1 week  Contact information:  [address]  [place] [address]  [phone]                        Pending Labs     Order Current Status    Tissue Pathology Exam In process         Shane Manrique MD  [facility]  11/02/22    Discharge time spent greater than 30 minutes.

## 2022-11-02 NOTE — ANESTHESIA PREPROCEDURE EVALUATION
Anesthesia Evaluation     NPO Solid Status: > 8 hours             Airway   Mallampati: II  Dental      Pulmonary    (+) a smoker Former, COPD, asthma,sleep apnea,     ROS comment: Positive HUGO screen/Diagnosis, 2 or more mitigating factors used (non-supine position, no narcotics)    Cardiovascular     (+) pacemaker pacemaker, hypertension, dysrhythmias Atrial Fib, CHF Diastolic >=55%,       Neuro/Psych  (+) dizziness/light headedness,    GI/Hepatic/Renal/Endo    (+) obesity,  GERD, PUD,  renal disease, thyroid problem hypothyroidism    Musculoskeletal     Abdominal    Substance History      OB/GYN          Other                          Anesthesia Plan    ASA 3     MAC       Anesthetic plan, risks, benefits, and alternatives have been provided, discussed and informed consent has been obtained with: patient.        CODE STATUS:

## 2022-11-02 NOTE — H&P
Patient Name:  Brittany Villeda  YOB: 1935  MRN:  [id number]  Admit Date:  11/1/2022  Patient Care Team:  Mega Esparza MD as PCP - General (Family Medicine)  Micaela English MD as Consulting Physician (Gastroenterology)  Mary Grace Culp MD as Consulting Physician (Cardiology)  Jp Karuse Jr., MD as Consulting Physician (Hematology and Oncology)  Yasmeen Pichardo [facility] as Pharmacist  Micaela English MD as Referring Physician (Gastroenterology)  Devon Valadez MD as Consulting Physician (Hematology and Oncology)  Rusty Interiano, JamarD as Pharmacist (Pharmacy)      Subjective   History Present Illness     Chief Complaint   Patient presents with   • Rectal Bleeding     History of Present Illness   Ms. 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  Patient does endorse previous GI bleeding and previous EGD, she is followed by GI who advised in clinic last week that her bleeding was likely due to hemorrhoids.  She states since that visit her stool is now diarrhea with bloody mucus. She also endorses associated nonradiating 3/10 lower abdominal cramping pain.  Patient denies any nausea, or vomiting she is not on blood thinners, she and her daughter at bedside confirm she has been off blood thinner since having a brain bleed about a year ago. Patient   Patient denies any other acute distress, chest pain, new or changed shortness of breath, palpitations, lightheadedness, dizziness, syncope, nausea, vomiting, constipation or  symptoms.     In the emergency department today patient was found to be heme positive, hemoglobin stable at 12 otherwise her labs are unremarkable.  CT of the abdomen shows diverticulosis, with  no inflammatory process identified.    Patient will be admitted to this group for further evaluation treatment of GI bleed and other acute and or chronic conditions.    Review of Systems   HENT: Positive for congestion.    Eyes: Negative.    Respiratory: Negative.    Cardiovascular: Positive for leg swelling.   Gastrointestinal: Positive for abdominal pain and diarrhea.   Endocrine: Negative.    Genitourinary: Negative.    Musculoskeletal: Negative.    Skin: Negative.    Allergic/Immunologic: Negative.    Neurological: Negative.    Hematological: Bruises/bleeds easily.   Psychiatric/Behavioral: Negative.    All other systems reviewed and are negative.       Personal History     Past Medical History:   Diagnosis Date   • Acute UTI (urinary tract infection) 04/08/2021   • Anemia    • Arrhythmia    • Arthritis    • Asthma    • Bradycardia    • Chest pain    • Choledocholithiasis 05/09/2021   • Colitis    • COPD (chronic obstructive pulmonary disease) ([facility])    • Diastolic dysfunction    • Essential hypertension 05/12/2016   • GERD (gastroesophageal reflux disease)    • Heart block    • Hypertension    • Hypertensive heart disease    • Hyperthyroidism    • Kidney stone    • Leukopenia    • Low back pain    • MGUS (monoclonal gammopathy of unknown significance)    • Nephrolithiasis    • Obesity    • Paroxysmal atrial fibrillation ([facility])    • Peptic ulceration    • Persistent atrial fibrillation ([facility])    • PSVT (paroxysmal supraventricular tachycardia) ([facility])    • Pulmonary hypertension ([facility])    • Rectal bleed    • Systemic hypertension    • Trifascicular block    • 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[facility])    • Ventricular tachycardia     nonsustained   • Vertigo      Past Surgical History:   Procedure Laterality Date   • BACK SURGERY      lumbar fusion   • PAWAN HOLE Left 8/8/2021    Procedure: Left-sided pawan holes for evacuation of subdural hematoma;  Surgeon: Gustavo Callaway MD;  Location: [facility];   Service: Neurosurgery;  Laterality: Left;   • CARDIAC ELECTROPHYSIOLOGY PROCEDURE N/A 2018    Procedure: Pacemaker DC new   [place];  Surgeon: Jesus Granda MD;  Location: [facility] CATH INVASIVE LOCATION;  Service: Cardiology   • CHOLECYSTECTOMY     • COLONOSCOPY  2014    colitis, cryptitis,  tics, NBIH, TA w/low grade dysplasia   • COLONOSCOPY N/A 10/28/2019    Procedure: COLONOSCOPY WITH COLD AND HOT POLYPECTOMIES;  Surgeon: Micaela English MD;  [facility];  Service: Gastroenterology   • ENDOSCOPY N/A 2021    Procedure: ESOPHAGOGASTRODUODENOSCOPY with BX;  Surgeon: Stefan Mcfadden MD;  [facility];  Service: Gastroenterology;  Laterality: N/A;  EPIGASTRIC PAIN  --DUODENAL ULCER, HEMORRHAGIC GASTRITIS, HIATAL HERNIA    • ENDOSCOPY N/A 10/11/2022    Procedure: ESOPHAGOGASTRODUODENOSCOPY;  Surgeon: Micaela English MD;  [facility];  Service: Gastroenterology;  Laterality: N/A;  PRE- MELENA  POST- ESOPHAGITIS   • HEMORRHOIDECTOMY     • HYSTERECTOMY     • JOINT REPLACEMENT     • KNEE ARTHROPLASTY     • MYOMECTOMY     • PACEMAKER IMPLANTATION     • SHOULDER SURGERY     • SINUS SURGERY     • TOE NAIL AMPUTATION  2019   • TONSILLECTOMY       Family History   Problem Relation Age of Onset   • Diabetes Mother    • Breast cancer Sister    • Kidney cancer Sister    • Heart disease Sister    • Prostate cancer Brother    • Prostate cancer Brother    • Prostate cancer Brother    • Malig Hyperthermia Neg Hx      Social History     Tobacco Use   • Smoking status: Former     Packs/day: 1.50     Years: 10.00     Pack years: 15.00     Types: Cigarettes     Start date:      Quit date: 1965     Years since quittin.8   • Smokeless tobacco: Never   • Tobacco comments:     QUIT SMOKING    Vaping Use   • Vaping Use: Never used   Substance Use Topics   • Alcohol use: No   • Drug use: Never     No current facility-administered medications on  file prior to encounter.     Current Outpatient Medications on File Prior to Encounter   Medication Sig Dispense Refill   • acetaminophen (TYLENOL) 500 MG tablet Take 1 tablet by mouth Every 6 (Six) Hours As Needed for Mild Pain .     • albuterol sulfate  (90 Base) MCG/ACT inhaler Inhale 2 puffs Every 4 (Four) Hours As Needed for Wheezing (or cough). 18 g 0   • docusate sodium (COLACE) 100 MG capsule Take 100 mg by mouth Every Night.     • ELDERBERRY PO Take 2 tablets by mouth Daily.     • fexofenadine (ALLEGRA) 180 MG tablet Take 180 mg by mouth As Needed.     • furosemide (Lasix) 20 MG tablet Take 1 tablet by mouth Daily. 90 tablet 1   • magnesium oxide (MAG-OX) 400 MG tablet Take 1 tablet by mouth Daily. 90 tablet 1   • mesalamine (APRISO) 0.375 g 24 hr capsule Take 4 capsules by mouth Daily. 360 capsule 3   • mesalamine (ROWASA) 4 g enema Insert 1 enema into the rectum See Admin Instructions. Insert one enema qAM and qPM for 30mins, try and retain overnight 180 mL 3   • pantoprazole (PROTONIX) 40 MG EC tablet TAKE 1 TABLET TWICE DAILY 180 tablet 1   • potassium chloride (K-DUR,KLOR-CON) 10 MEQ CR tablet      • potassium chloride 10 MEQ CR tablet Take 1 tablet by mouth Daily. 90 tablet 1   • predniSONE (DELTASONE) 50 MG tablet Take one tablet 13 hours prior to CT scan; Take one tablet 7 hours prior to CT scan; Take one tablet 1 hour prior to CT scan 3 tablet 0   • vitamin B-12 (CYANOCOBALAMIN) 1000 MCG tablet Take 1,000 mcg by mouth Daily.     • [DISCONTINUED] mesalamine (CANASA) 1000 MG suppository Insert 1,000 mg into the rectum Every Night.       Allergies   Allergen Reactions   • Codeine Hallucinations     Tolerates hydromorphone   • Amitriptyline Rash   • Amoxicillin-Pot Clavulanate Rash   • Aspirin Unknown - Low Severity     Patient doesn't know why   • Bactrim [Sulfamethoxazole-Trimethoprim] Rash   • Carisoprodol-Aspirin-Codeine Palpitations   • Iodinated Diagnostic Agents Rash   • Latex Rash   •  Naproxen Rash   • Nsaids Unknown - Low Severity     unknown   • Soma Compound With Codeine [Carisoprodol-Aspirin-Codeine] Rash   • Sulfa Antibiotics Rash   • Tramadol Palpitations     heart races        Objective    Objective     Vital Signs  Temp:  [97.4 °F (36.3 °C)] 97.4 °F (36.3 °C)  Heart Rate:  [62-70] 70  Resp:  [18] 18  BP: (112-129)/(56-64) 129/64  SpO2:  [98 %] 98 %  on   ;   Device (Oxygen Therapy): room air  There is no height or weight on file to calculate BMI.    Physical Exam  Vitals and nursing note reviewed.   Constitutional:       Appearance: She is obese.   HENT:      Mouth/Throat:      Mouth: Mucous membranes are dry.   Cardiovascular:      Rate and Rhythm: Normal rate. Rhythm irregular.      Pulses: Normal pulses.      Heart sounds: Normal heart sounds.   Pulmonary:      Effort: Pulmonary effort is normal.      Breath sounds: Examination of the right-lower field reveals decreased breath sounds. Examination of the left-lower field reveals decreased breath sounds. Decreased breath sounds present.   Abdominal:      General: Bowel sounds are normal.      Palpations: Abdomen is soft.      Tenderness: There is guarding.   Musculoskeletal:      Right lower leg: Edema present.      Left lower leg: Edema present.   Skin:     General: Skin is warm and dry.      Capillary Refill: Capillary refill takes less than 2 seconds.   Neurological:      General: No focal deficit present.      Mental Status: She is alert and oriented to person, place, and time.         Results Review:  I reviewed the patient's new clinical results.  I reviewed the patient's new imaging results and agree with the interpretation.  I reviewed the patient's other test results and agree with the interpretation  I personally viewed and interpreted the patient's EKG/Telemetry data  Discussed with ED provider.    Lab Results (last 24 hours)     Procedure Component Value Units Date/Time    CBC & Differential [[id number]]  (Abnormal) Collected:  11/01/22 1810    Specimen: Blood Updated: 11/01/22 1900    Narrative:      The following orders were created for panel order CBC & Differential.  Procedure                               Abnormality         Status                     ---------                               -----------         ------                     CBC Auto Differential[[id number]]        Abnormal            Final result                 Please view results for these tests on the individual orders.    Lipase [[id number]]  (Normal) Collected: 11/01/22 1810    Specimen: Blood Updated: 11/01/22 1848     Lipase 19 U/L     CBC Auto Differential [[id number]]  (Abnormal) Collected: 11/01/22 1810    Specimen: Blood Updated: 11/01/22 1900     WBC 3.59 10*3/mm3      RBC 3.63 10*6/mm3      Hemoglobin 12.2 g/dL      Hematocrit 34.6 %      MCV 95.3 fL      MCH 33.6 pg      MCHC 35.3 g/dL      RDW 12.7 %      RDW-SD 45.1 fl      MPV 10.6 fL      Platelets 115 10*3/mm3      Neutrophil % 37.6 %      Lymphocyte % 44.8 %      Monocyte % 14.8 %      Eosinophil % 2.2 %      Basophil % 0.3 %      Neutrophils, Absolute 1.35 10*3/mm3      Lymphocytes, Absolute 1.61 10*3/mm3      Monocytes, Absolute 0.53 10*3/mm3      Eosinophils, Absolute 0.08 10*3/mm3      Basophils, Absolute 0.01 10*3/mm3     Comprehensive Metabolic Panel [[id number]]  (Abnormal) Collected: 11/01/22 2032    Specimen: Blood Updated: 11/01/22 2104     Glucose 85 mg/dL      BUN 14 mg/dL      Creatinine 0.74 mg/dL      Sodium 139 mmol/L      Potassium 4.3 mmol/L      Chloride 106 mmol/L      CO2 25.9 mmol/L      Calcium 10.8 mg/dL      Total Protein 6.8 g/dL      Albumin 3.50 g/dL      ALT (SGPT) 12 U/L      AST (SGOT) 16 U/L      Alkaline Phosphatase 75 U/L      Total Bilirubin 1.4 mg/dL      Globulin 3.3 gm/dL      A/G Ratio 1.1 g/dL      BUN/Creatinine Ratio 18.9     Anion Gap 7.1 mmol/L      eGFR 78.4 mL/min/1.73      Comment: National Kidney Foundation and American Society of Nephrology (ASN) Task Force  recommended calculation based on the Chronic Kidney Disease Epidemiology Collaboration (CKD-EPI) equation refit without adjustment for race.       Narrative:      GFR Normal >60  Chronic Kidney Disease <60  Kidney Failure <15    The GFR formula is only valid for adults with stable renal function between ages 18 and 70.          Imaging Results (Last 24 Hours)     Procedure Component Value Units Date/Time    CT Abdomen Pelvis Without Contrast [[id number]] Collected: 11/01/22 1905     Updated: 11/01/22 1918    Narrative:      CT ABDOMEN PELVIS WO CONTRAST-     INDICATIONS: Abdominal pain     TECHNIQUE: Radiation dose reduction techniques were utilized, including  automated exposure control and exposure modulation based on body size.  Unenhanced ABDOMEN AND PELVIS CT     COMPARISON: 05/09/2021     FINDINGS:     Gallbladder is surgically absent.     Bilateral renal cysts are present. Mildly dilated extrarenal pelves are  present bilaterally.     Right hemidiaphragm is elevated     Otherwise unremarkable appearance of the liver, spleen, adrenal glands,  pancreas, kidneys, bladder.     No bowel obstruction or abnormal bowel thickening is identified. Colonic  diverticula are seen that do not appear inflamed.     No free intraperitoneal gas or free fluid.     Scattered small mesenteric and para-aortic lymph nodes are seen that are  not significant by size criteria.     Abdominal aorta is not aneurysmal. Aortic and other arterial  calcifications are present.     The lung bases show mild atelectasis, right more than left. Cardiac  leads are partly included.     Degenerative, surgical changes are seen in the spine. Degenerative  changes are also seen at the sacroiliac joints. No acute fracture is  identified.             Impression:            1. Colonic diverticulosis. No focal acute inflammatory process of bowel  is identified, follow up as indications persist.  2. No urolithiasis or hydronephrosis.     This report was  finalized on 11/1/2022 7:14 PM by Dr. Nas Maharaj M.D.             Results for orders placed during the hospital encounter of 04/07/21    Adult Transthoracic Echo Complete W/ Cont if Necessary Per Protocol    Interpretation Summary  · The right atrial cavity is severely dilated.  · Left atrium is dilated severely  · Estimated left ventricular EF = 60% Left ventricular systolic function is normal.  · Left ventricular diastolic function was indeterminate.  · Mildly reduced right ventricular systolic function noted.  · Saline test results are negative.      No orders to display        Assessment/Plan     Active Hospital Problems    Diagnosis  POA   • **GI bleed [K92.2]  Yes   • Lower GI bleed [K92.2]  Yes      Resolved Hospital Problems   No resolved problems to display.      Lower GI bleed  Hemoccult positive in ED  Hemoglobin 12.2 at time of admission  Monitor H&H  Protonix GTT ordered  GI consulted  Clear liquid diet  Vital signs per policy  Continuous cardiac monitoring pulse oximetry  Supplemental oxygen as needed for hypoxia/respiratory distress maintain oxygen saturation greater than 90%    Atrial fibrillation  Chronic  Rate controlled  Patient not on anticoagulation secondary to ICH.  Continuous cardiac monitoring  S/P cardiac pacemaker      Thrombocytopenia  Chronic  Platelets 115 at time of admission-down from 123 on September 26, 2020  Monitor CBC  Followed by Hematology oncology outpatient       COPD (chronic obstructive pulmonary disease)   Chronic/stable  Patient not in exacerbation  Baseline PFTs unknown  Patient is not oxygen dependent at baseline  Continue home inhalers pending med rec verification  Supplemental oxygen as needed for hypoxia/respiratory distress    Essential hypertension  Chronic/stable  Vital signs per policy  Continue home regimen pending med rec verification and clinical appropriateness.      I discussed the patient's findings and my recommendations with patient and  family.    VTE Prophylaxis - SCDs.  Code Status - Full code.       SHAHID Swenson  [facility]  11/01/22  23:17 EDT

## 2022-11-02 NOTE — ANESTHESIA POSTPROCEDURE EVALUATION
Patient: Brittany Villeda    Procedure Summary     Date: 11/02/22 Room / Location: [facility] ENDOSCOPY 10 / [facility] ENDOSCOPY    Anesthesia Start: 1055 Anesthesia Stop: 1113    Procedure: SIGMOIDOSCOPY FLEXIBLE WITH COLD BIOPSIES AND ASPIRATE Diagnosis:       Gastrointestinal hemorrhage associated with anorectal source      (Gastrointestinal hemorrhage associated with anorectal source [K62.5])    Surgeons: Micaela English MD Provider: Jp Chow MD    Anesthesia Type: MAC ASA Status: 3          Anesthesia Type: MAC    Vitals  Vitals Value Taken Time   BP 92/45 11/02/22 1110   Temp     Pulse 66 11/02/22 1110   Resp 16 11/02/22 1110   SpO2 99 % 11/02/22 1110           Post Anesthesia Care and Evaluation    Patient location during evaluation: bedside  Patient participation: complete - patient participated  Level of consciousness: awake  Pain management: adequate    Airway patency: patent  Anesthetic complications: No anesthetic complications  PONV Status: controlled  Cardiovascular status: acceptable  Respiratory status: acceptable  Hydration status: acceptable    Comments: --------------------            11/02/22               1110     --------------------   BP:       92/45      Pulse:      66       Resp:       16       Temp:                SpO2:      99%      --------------------

## 2022-11-02 NOTE — PLAN OF CARE
Goal Outcome Evaluation:  Plan of Care Reviewed With: patient        Progress: no change   No evidence GI bleeding. Denies pain or nausea. Oriented to room and MD orders. VSS. Daughter at bedside. Rested well.

## 2022-11-02 NOTE — PLAN OF CARE
Goal Outcome Evaluation:  Plan of Care Reviewed With: patient        Progress: no change  Outcome Evaluation: Patien is A & O x 4. Makes needs known. Daughter at bedside. Patient had sigmoid flex scope earlier. No c/o pain. No s/s of distress noted.

## 2022-11-02 NOTE — CONSULTS
[facility]  Initial Inpatient Consult Note    Referring Provider: A    Reason for Consultation: rectal bleeding    Subjective     History of present illness:    87 y.o. female , whom I follow as an outpatient, that we are asked to see for rectal bleeding.  Patient has a history of ulcerative proctosigmoiditis.  She has been having episodic bleeding.  She was last seen in the office on 10/27/2022.  She has been having red blood tinged stools with mucus for the past 3 days.  Hemoglobin remains normal at 12.  No bleeding overnight.  She was having some abdominal pain yesterday.  No other new issues.  No black or tarry stool.  She denies heartburn or acid reflux.    Last month she complained of some black stool.  She had an EGD on 10/11/2022 with LA grade a esophagitis.    Her last colonoscopy was on 10/28/2019 which showed proctosigmoid ulcerative colitis, sigmoid diverticulosis, nonbleeding internal hemorrhoids.  She had multiple polyps removed 2 of which were tubular adenoma.  Her ulcerative colitis is managed with Apriso 1.5 g/day.    Past Medical History:  Past Medical History:   Diagnosis Date   • Acute UTI (urinary tract infection) 04/08/2021   • Anemia    • Arrhythmia    • Arthritis    • Asthma    • Bradycardia    • Chest pain    • Choledocholithiasis 05/09/2021   • Colitis    • COPD (chronic obstructive pulmonary disease) ([facility])    • Diastolic dysfunction    • Essential hypertension 05/12/2016   • GERD (gastroesophageal reflux disease)    • Heart block    • Hypertension    • Hypertensive heart disease    • Hyperthyroidism    • Kidney stone    • Leukopenia    • Low back pain    • MGUS (monoclonal gammopathy of unknown significance)    • Nephrolithiasis    • Obesity    • Paroxysmal atrial fibrillation (HCC)    • Peptic ulceration    • Persistent atrial fibrillation (HCC)    • PSVT (paroxysmal supraventricular tachycardia) (HCC)    • Pulmonary hypertension (HCC)    • Rectal bleed    •  Systemic hypertension    • Trifascicular block    • 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HCC)    • Ventricular tachycardia     nonsustained   • Vertigo      Past Surgical History:  Past Surgical History:   Procedure Laterality Date   • BACK SURGERY      lumbar fusion   • DUSTY HOLE Left 2021    Procedure: Left-sided dusty holes for evacuation of subdural hematoma;  Surgeon: Gustavo Callaway MD;  Location: [facility] MAIN OR;  Service: Neurosurgery;  Laterality: Left;   • CARDIAC ELECTROPHYSIOLOGY PROCEDURE N/A 2018    Procedure: Pacemaker DC new   [place];  Surgeon: Jesus Granda MD;  Location: [facility] CATH INVASIVE LOCATION;  Service: Cardiology   • CHOLECYSTECTOMY     • COLONOSCOPY  2014    colitis, cryptitis,  tics, NBIH, TA w/low grade dysplasia   • COLONOSCOPY N/A 10/28/2019    Procedure: COLONOSCOPY WITH COLD AND HOT POLYPECTOMIES;  Surgeon: Micaela English MD;  Location: [facility] ENDOSCOPY;  Service: Gastroenterology   • ENDOSCOPY N/A 2021    Procedure: ESOPHAGOGASTRODUODENOSCOPY with BX;  Surgeon: Stefan Mcfadden MD;  Location: [facility] ENDOSCOPY;  Service: Gastroenterology;  Laterality: N/A;  EPIGASTRIC PAIN  --DUODENAL ULCER, HEMORRHAGIC GASTRITIS, HIATAL HERNIA    • ENDOSCOPY N/A 10/11/2022    Procedure: ESOPHAGOGASTRODUODENOSCOPY;  Surgeon: Micaela English MD;  Location: [facility] ENDOSCOPY;  Service: Gastroenterology;  Laterality: N/A;  PRE- MELENA  POST- ESOPHAGITIS   • HEMORRHOIDECTOMY     • HYSTERECTOMY     • JOINT REPLACEMENT     • KNEE ARTHROPLASTY     • MYOMECTOMY     • PACEMAKER IMPLANTATION     • SHOULDER SURGERY     • SINUS SURGERY     • TOE NAIL AMPUTATION  2019   • TONSILLECTOMY        Social History:   Social History     Tobacco Use   • Smoking status: Former     Packs/day: 1.50     Years: 10.00     Pack years: 15.00     Types: Cigarettes     Start date:      Quit date: 1965     Years since quittin.8   • Smokeless tobacco: Never   •  Tobacco comments:     QUIT SMOKING 1965   Substance Use Topics   • Alcohol use: No      Family History:  Family History   Problem Relation Age of Onset   • Diabetes Mother    • Breast cancer Sister    • Kidney cancer Sister    • Heart disease Sister    • Prostate cancer Brother    • Prostate cancer Brother    • Prostate cancer Brother    • Malig Hyperthermia Neg Hx        Home Meds:  Medications Prior to Admission   Medication Sig Dispense Refill Last Dose   • acetaminophen (TYLENOL) 500 MG tablet Take 1 tablet by mouth Every 6 (Six) Hours As Needed for Mild Pain .      • albuterol sulfate  (90 Base) MCG/ACT inhaler Inhale 2 puffs Every 4 (Four) Hours As Needed for Wheezing (or cough). 18 g 0    • magnesium oxide (MAG-OX) 400 MG tablet Take 1 tablet by mouth Daily. 90 tablet 1    • mesalamine (APRISO) 0.375 g 24 hr capsule Take 4 capsules by mouth Daily. 360 capsule 3    • pantoprazole (PROTONIX) 40 MG EC tablet TAKE 1 TABLET TWICE DAILY 180 tablet 1    • potassium chloride (K-DUR,KLOR-CON) 10 MEQ CR tablet 1 tablet Daily.      • potassium chloride 10 MEQ CR tablet Take 1 tablet by mouth Daily. 90 tablet 1    • vitamin B-12 (CYANOCOBALAMIN) 1000 MCG tablet Take 1,000 mcg by mouth Daily.      • docusate sodium (COLACE) 100 MG capsule Take 1 capsule by mouth At Night As Needed.      • ELDERBERRY PO Take 2 tablets by mouth Daily.      • mesalamine (CANASA) 1000 MG suppository Insert 1 suppository into the rectum Every Night.        Current Meds:   hydrocortisone, 25 mg, Rectal, BID  pantoprazole, 40 mg, Oral, QAM AC      Allergies:  Allergies   Allergen Reactions   • Codeine Hallucinations     Tolerates hydromorphone   • Amitriptyline Rash   • Amoxicillin-Pot Clavulanate Rash   • Aspirin Unknown - Low Severity     Patient doesn't know why   • Bactrim [Sulfamethoxazole-Trimethoprim] Rash   • Carisoprodol-Aspirin-Codeine Palpitations   • Iodinated Diagnostic Agents Rash   • Latex Rash   • Naproxen Rash   • Nsaids  Unknown - Low Severity     unknown   • Soma Compound With Codeine [Carisoprodol-Aspirin-Codeine] Rash   • Sulfa Antibiotics Rash   • Tramadol Palpitations     heart races      Review of Systems  Pertinent items are noted in HPI, all other systems reviewed and negative     Objective     Vital Signs  Temp:  [97.4 °F (36.3 °C)-98.2 °F (36.8 °C)] 98.2 °F (36.8 °C)  Heart Rate:  [62-72] 72  Resp:  [18] 18  BP: (112-153)/(56-81) 122/66  Physical Exam:  General Appearance:    Alert, cooperative, in no acute distress   Head:    Normocephalic, without obvious abnormality, atraumatic   Eyes:          conjunctivae and sclerae normal, no   icterus   Throat:   no thrush, oral mucosa moist   Neck:   Supple, no adenopathy   Lungs:     Clear to auscultation bilaterally    Heart:    Regular rhythm and normal rate    Chest Wall:    No abnormalities observed   Abdomen:     Soft, nondistended, nontender; normal bowel sounds   Extremities:   1+ le edema, no redness   Skin:   No bruising or rash   Psychiatric:  normal mood and insight     Results Review:   I reviewed the patient's new clinical results.    Results from last 7 days   Lab Units 11/02/22  0610 11/02/22 0233 11/01/22 1810   WBC 10*3/mm3  --  3.24* 3.59   HEMOGLOBIN g/dL 11.6* 12.0 12.2   HEMATOCRIT %  --  35.3 34.6   PLATELETS 10*3/mm3  --  113* 115*     Results from last 7 days   Lab Units 11/02/22 0233 11/01/22  2032   SODIUM mmol/L 138 139   POTASSIUM mmol/L 4.8 4.3   CHLORIDE mmol/L 105 106   CO2 mmol/L 25.0 25.9   BUN mg/dL 11 14   CREATININE mg/dL 0.76 0.74   CALCIUM mg/dL 10.1 10.8*   BILIRUBIN mg/dL  --  1.4*   ALK PHOS U/L  --  75   ALT (SGPT) U/L  --  12   AST (SGOT) U/L  --  16   GLUCOSE mg/dL 99 85     Results from last 7 days   Lab Units 11/02/22  0233   INR  1.20*     Lab Results   Lab Value Date/Time    LIPASE 19 11/01/2022 1810    LIPASE 14 05/09/2021 1109    LIPASE 19 05/04/2019 1239       Radiology:  CT Abdomen Pelvis Without Contrast   Final Result            1. Colonic diverticulosis. No focal acute inflammatory process of bowel   is identified, follow up as indications persist.   2. No urolithiasis or hydronephrosis.       This report was finalized on 11/1/2022 7:14 PM by Dr. Nas Maharaj M.D.              Assessment & Plan   Patient Active Problem List   Diagnosis   • Sciatica   • Non-toxic multinodular goiter   • Chronic midline low back pain with right-sided sciatica   • Essential hypertension   • Gastroesophageal reflux disease without esophagitis   • Cataract   • Pulmonary hypertension ([facility])   • Diastolic dysfunction   • HUGO (obstructive sleep apnea)   • Trifascicular block   • Leukopenia   • MGUS (monoclonal gammopathy of unknown significance)   • 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[facility])   • Other chest pain   • Lichen sclerosus   • Heart block   • Sleep-related hypoxia   • Bradycardia   • Shoulder arthritis   • History of atrial fibrillation   • Pacemaker   • Paroxysmal SVT (supraventricular tachycardia) ([facility])   • History of chest pain   • Palpitations   • Atrial fibrillation ([facility])   • Rectal bleeding   • Arthritis   • Ascending aortic aneurysm   • Mediastinal lymphadenopathy   • Immobility   • Left knee pain   • Hemarthrosis of left knee   • Anemia   • Obesity (BMI 30-39.9)   • Generalized weakness   • Dysautonomia ([facility])   • Weakness of both lower extremities   • Macrocytosis   • Hypercalcemia   • Arthritis of right wrist   • Hyperparathyroidism ([facility])   • Choledocholithiasis   • Essential hypertension   • Hyperglycemia   • Epigastric pain   • Duodenal ulcer   • Hemorrhagic gastritis   • Exertional dyspnea   • COPD (chronic obstructive pulmonary disease) ([facility])   • Functional quadriplegia ([facility])   • Hip pain   • Osteoarthritis of glenohumeral joint   • Other malaise and fatigue   • Shoulder pain   • ICH (intracerebral hemorrhage) ([facility])   • Melena   • GI bleed   • Lower GI bleed   • Thrombocytopenia ([facility])       Assessment:  1. Rectal  bleeding  2. Ulcerative colitis-proctosigmoiditis  3. GERD with esophagitis    Plan:  · Plan for flex sig today for further evaluation of bleeding-suspect internal hemorrhoids versus her known colitis.  Thankfully hemoglobin is remained stable.  She does have diverticulosis but this is not consistent with a diverticular bleed.  · Continue Apriso 1.5 g daily.      I discussed the patients findings and my recommendations with patient, family and nursing staff.    Micaela English MD

## 2022-11-03 ENCOUNTER — TELEPHONE (OUTPATIENT)
Dept: GASTROENTEROLOGY | Facility: CLINIC | Age: 87
End: 2022-11-03

## 2022-11-03 ENCOUNTER — TRANSITIONAL CARE MANAGEMENT TELEPHONE ENCOUNTER (OUTPATIENT)
Dept: CALL CENTER | Facility: HOSPITAL | Age: 87
End: 2022-11-03

## 2022-11-03 LAB
LAB AP CASE REPORT: NORMAL
LAB AP DIAGNOSIS COMMENT: NORMAL
PATH REPORT.FINAL DX SPEC: NORMAL
PATH REPORT.GROSS SPEC: NORMAL

## 2022-11-03 NOTE — TELEPHONE ENCOUNTER
Called pt's daughter and she reports that her mother is taking her mesalamine in the am and is taking the prednisone around noon.   She reports that the hospital call to get her mother scheduled for ct scan. Advised she does not need the ct scan since she has one done on 11/01 while in hospital. Verb understanding.     Update sent Dr English .

## 2022-11-03 NOTE — TELEPHONE ENCOUNTER
Caller: Amina Diana    Relationship: Emergency Contact    Best call back number: [phone]    What medications are you currently taking:     PREDNISONE      Current Outpatient Medications on File Prior to Visit   Medication Sig Dispense Refill   • acetaminophen (TYLENOL) 500 MG tablet Take 1 tablet by mouth Every 6 (Six) Hours As Needed for Mild Pain .     • albuterol sulfate  (90 Base) MCG/ACT inhaler Inhale 2 puffs Every 4 (Four) Hours As Needed for Wheezing (or cough). 18 g 0   • docusate sodium (COLACE) 100 MG capsule Take 1 capsule by mouth At Night As Needed.     • ELDERBERRY PO Take 2 tablets by mouth Daily.     • magnesium oxide (MAG-OX) 400 MG tablet Take 1 tablet by mouth Daily. 90 tablet 1   • mesalamine (APRISO) 0.375 g 24 hr capsule Take 4 capsules by mouth Daily. 360 capsule 3   • pantoprazole (PROTONIX) 40 MG EC tablet TAKE 1 TABLET TWICE DAILY 180 tablet 1   • potassium chloride 10 MEQ CR tablet Take 1 tablet by mouth Daily. 90 tablet 1   • predniSONE (DELTASONE) 10 MG tablet Take 4 tablets by mouth Daily for 5 days, THEN 3 tablets Daily for 5 days, THEN 2 tablets Daily for 5 days, THEN 1 tablet Daily for 5 days. 50 tablet 0   • vitamin B-12 (CYANOCOBALAMIN) 1000 MCG tablet Take 1,000 mcg by mouth Daily.       Current Facility-Administered Medications on File Prior to Visit   Medication Dose Route Frequency Provider Last Rate Last Admin   • [COMPLETED] Influenza Vac High-Dose Quad (FLUZONE HIGH DOSE) injection 0.7 mL  0.7 mL Intramuscular During Hospitalization Micaela English MD   0.7 mL at 11/02/22 1707   • [DISCONTINUED] ePHEDrine injection   Intravenous PRN Jp Chow MD   5 mg at 11/02/22 1117   • [DISCONTINUED] hydrocortisone (ANUSOL-HC) suppository 25 mg  25 mg Rectal BID Ash Seo MD   25 mg at 11/02/22 0806   • [DISCONTINUED] lactated ringers infusion   Intravenous Continuous PRN Jp Chow MD   Stopped at 11/02/22 1410   • [DISCONTINUED]  lidocaine (XYLOCAINE) 2% injection   Intravenous PRN Jp Chow MD   60 mg at 11/02/22 1057   • [DISCONTINUED] nitroglycerin (NITROSTAT) SL tablet 0.4 mg  0.4 mg Sublingual Q5 Min PRN Micaela English MD       • [DISCONTINUED] pantoprazole (PROTONIX) EC tablet 40 mg  40 mg Oral QAM AC Micaela English MD   40 mg at 11/02/22 0806   • [DISCONTINUED] predniSONE (DELTASONE) tablet 40 mg  40 mg Oral Daily With Breakfast Micaela English MD   40 mg at 11/02/22 1410   • [DISCONTINUED] Propofol (DIPRIVAN) injection   Intravenous PRN Jp Chow MD   80 mg at 11/02/22 1058   • [DISCONTINUED] Propofol (DIPRIVAN) injection   Intravenous Continuous PRN Jp Chow MD   Stopped at 11/02/22 1107   • [DISCONTINUED] sodium chloride 0.9 % flush 10 mL  10 mL Intravenous PRN Micaela English MD              When did you start taking these medications: 11/3/22    Which medication are you concerned about: PREDNISONE     Who prescribed you this medication: MICAELA ENGLISH     What are your concerns: CAN SHE TAKE ALL OF HER MEDICATIONS TOGETHER WITH PREDNISONE OR DOES SHE NEED TO SPACE HER MEDICATIONS OUT THROUGH THE DAY?     How long have you had these concerns: 11/2/22

## 2022-11-03 NOTE — OUTREACH NOTE
Prep Survey    Flowsheet Row Responses   [facility] patient discharged from? [place]   Is LACE score < 7 ? No   Emergency Room discharge w/ pulse ox? No   Eligibility [facility]   Date of Admission 11/01/22   Date of Discharge 11/02/22   Discharge Disposition Home or Self Care   Discharge diagnosis Lower GI bleed    Does the patient have one of the following disease processes/diagnoses(primary or secondary)? Other   Does the patient have Home health ordered? No   Is there a DME ordered? No   Prep survey completed? Yes          SAM VERNON - Registered Nurse

## 2022-11-03 NOTE — CASE MANAGEMENT/SOCIAL WORK
Case Management Discharge Note      Final Note: HomeJOSE ELIAS Souza T.D         Selected Continued Care - Discharged on 11/2/2022 Admission date: 11/1/2022 - Discharge disposition: Home or Self Care    Destination    No services have been selected for the patient.              Durable Medical Equipment    No services have been selected for the patient.              Dialysis/Infusion    No services have been selected for the patient.              Home Medical Care    No services have been selected for the patient.              Therapy    No services have been selected for the patient.              Community Resources    No services have been selected for the patient.              Community & DME    No services have been selected for the patient.                  Transportation Services  Private: Car    Final Discharge Disposition Code: 01 - home or self-care

## 2022-11-03 NOTE — TELEPHONE ENCOUNTER
Hub staff attempted to follow warm transfer process and was unsuccessful     Caller: Amina Diana    Relationship to patient: Emergency Contact    Best call back number: [phone]    Patient is needing: PATIENT IS DUE TO START MEDICATION TODAY. EMERGENCY CONTACT IS REQUESTING CALL BACK TODAY WITH UPDATE REGARDING MESSAGE SENT TO .

## 2022-11-03 NOTE — TELEPHONE ENCOUNTER
Ok to take prednisone with other meds     Biopsies showed nonspecific inflammation.  Stool studies were negative for c diff    F/u as scheduled

## 2022-11-03 NOTE — OUTREACH NOTE
Call Center TCM Note    Flowsheet Row Responses   [facility] patient discharged from? [place]   Does the patient have one of the following disease processes/diagnoses(primary or secondary)? Other   TCM attempt successful? Yes   Call start time 0937   Call end time 0944   Discharge diagnosis Lower GI bleed    Person spoke with today (if not patient) and relationship Amina daughter   Meds reviewed with patient/caregiver? Yes   Is the patient having any side effects they believe may be caused by any medication additions or changes? No   Does the patient have all medications ordered at discharge? Yes   Is the patient taking all medications as directed (includes completed medication regime)? Yes   Comments Has a previously scheduled followup on 11/7/2022 with Dr Esparza. Would like to keep as scheduled.  Patient is established,    Does the patient have an appointment with their PCP within 7 days of discharge? Yes   Psychosocial issues? No   Did the patient receive a copy of their discharge instructions? Yes   Nursing interventions Reviewed instructions with patient   What is the patient's perception of their health status since discharge? Improving   Is the patient/caregiver able to teach back signs and symptoms related to disease process for when to call PCP? Yes   Is the patient/caregiver able to teach back signs and symptoms related to disease process for when to call 911? Yes   Is the patient/caregiver able to teach back the hierarchy of who to call/visit for symptoms/problems? PCP, Specialist, Home health nurse, Urgent Care, ED, 911 Yes   If the patient is a current smoker, are they able to teach back resources for cessation? Not a smoker   TCM call completed? Yes   Call end time 0944   Would this patient benefit from a Referral to Amb Social Work? No   Is the patient interested in additional calls from an ambulatory ?  NOTE:  applies to high risk patients requiring additional follow-up. No           Fox Medina RN    11/3/2022, 09:44 EDT

## 2022-11-08 RX ORDER — MESALAMINE 0.38 G/1
CAPSULE, EXTENDED RELEASE ORAL
Qty: 360 CAPSULE | Refills: 3 | Status: SHIPPED | OUTPATIENT
Start: 2022-11-08

## 2022-11-09 ENCOUNTER — APPOINTMENT (OUTPATIENT)
Dept: CT IMAGING | Facility: HOSPITAL | Age: 87
End: 2022-11-09

## 2022-11-10 ENCOUNTER — TELEPHONE (OUTPATIENT)
Dept: GASTROENTEROLOGY | Facility: CLINIC | Age: 87
End: 2022-11-10

## 2022-11-10 RX ORDER — LANOLIN ALCOHOL/MO/W.PET/CERES
1000 CREAM (GRAM) TOPICAL DAILY
Qty: 90 TABLET | Refills: 1 | Status: SHIPPED | OUTPATIENT
Start: 2022-11-10 | End: 2023-01-27

## 2022-11-10 NOTE — TELEPHONE ENCOUNTER
Rx Refill Note  Requested Prescriptions     Pending Prescriptions Disp Refills   • vitamin B-12 (CYANOCOBALAMIN) 1000 MCG tablet 90 tablet 1     Sig: Take 1 tablet by mouth Daily.      Last office visit with prescribing clinician: 4/25/2022      Next office visit with prescribing clinician: Visit date not found            Gordon Monroy  11/10/22, 09:19 EST

## 2022-11-10 NOTE — TELEPHONE ENCOUNTER
----- Message from Micaela English MD sent at 10/11/2022  3:51 PM EDT -----  Hemoglobin is stable - slightly improved

## 2022-11-14 ENCOUNTER — OFFICE VISIT (OUTPATIENT)
Dept: GASTROENTEROLOGY | Facility: CLINIC | Age: 87
End: 2022-11-14

## 2022-11-14 VITALS
SYSTOLIC BLOOD PRESSURE: 137 MMHG | OXYGEN SATURATION: 98 % | TEMPERATURE: 97.7 F | DIASTOLIC BLOOD PRESSURE: 77 MMHG | WEIGHT: 180.9 LBS | HEIGHT: 64 IN | BODY MASS INDEX: 30.88 KG/M2 | HEART RATE: 70 BPM

## 2022-11-14 DIAGNOSIS — K51.30 ULCERATIVE RECTOSIGMOIDITIS WITHOUT COMPLICATION: Primary | ICD-10-CM

## 2022-11-14 PROCEDURE — 99214 OFFICE O/P EST MOD 30 MIN: CPT | Performed by: PHYSICIAN ASSISTANT

## 2022-11-14 NOTE — PROGRESS NOTES
Chief Complaint  ulcerative colitis w/ rectal bleeding, mucus in stool, and Abdominal Pain    Subjective        History of Present Illness  Brittany Villeda is a  87 y.o. female here for hospital follow-up for ulcerative colitis with rectal bleeding.  She is accompanied to today's appointment by her daughter.    Underwent a flex sig during hospitalization 11/2/2022 with inflammation from rectum to the sigmoid colon which was mild in severity worse than previous examinations.  Biopsies with nonspecific inflammation, stool studies negative.  She was placed on a prednisone taper in addition to Apriso.    She remains on prednisone taper.  Her bowels are moving twice daily with occasional left lower quadrant abdominal pain.  She has been taking the Apriso.  Continues to experience some bloody mucus in the stools but notes this is somewhat improved compared to hospitalization.  No fevers, chills, nausea, vomiting, heartburn/reflux.    CT abdomen and pelvis with contrast completed 11/1/2022 reviewed with patient and daughter.     Past Medical History:   Diagnosis Date   • Acute UTI (urinary tract infection) 04/08/2021   • Anemia    • Arrhythmia    • Arthritis    • Asthma    • Bradycardia    • Chest pain    • Choledocholithiasis 05/09/2021   • Colitis    • COPD (chronic obstructive pulmonary disease) ([facility])    • Diastolic dysfunction    • Essential hypertension 05/12/2016   • GERD (gastroesophageal reflux disease)    • Heart block    • Hypertension    • Hypertensive heart disease    • Hyperthyroidism    • Kidney stone    • Leukopenia    • Low back pain    • MGUS (monoclonal gammopathy of unknown significance)    • Nephrolithiasis    • Obesity    • Paroxysmal atrial fibrillation (HCC)    • Peptic ulceration    • Persistent atrial fibrillation (HCC)    • PSVT (paroxysmal supraventricular tachycardia) ([facility])    • Pulmonary hypertension (HCC)    • Rectal bleed    • Systemic hypertension    • Trifascicular block    • Ulcerative  rectosigmoiditis without complication (HCC)    • Ventricular tachycardia     nonsustained   • Vertigo        Past Surgical History:   Procedure Laterality Date   • BACK SURGERY      lumbar fusion   • DUSTY HOLE Left 8/8/2021    Procedure: Left-sided dusty holes for evacuation of subdural hematoma;  Surgeon: Gustavo Callaway MD;  Location: [facility] MAIN OR;  Service: Neurosurgery;  Laterality: Left;   • CARDIAC ELECTROPHYSIOLOGY PROCEDURE N/A 11/7/2018    Procedure: Pacemaker DC new   [place];  Surgeon: Jesus Granda MD;  Location: [facility] CATH INVASIVE LOCATION;  Service: Cardiology   • CHOLECYSTECTOMY     • COLONOSCOPY  06/16/2014    colitis, cryptitis,  tics, NBIH, TA w/low grade dysplasia   • COLONOSCOPY N/A 10/28/2019    Procedure: COLONOSCOPY WITH COLD AND HOT POLYPECTOMIES;  Surgeon: Micaela English MD;  Location: [facility] ENDOSCOPY;  Service: Gastroenterology   • ENDOSCOPY N/A 5/13/2021    Procedure: ESOPHAGOGASTRODUODENOSCOPY with BX;  Surgeon: Stefan Mcfadden MD;  Location: [facility] ENDOSCOPY;  Service: Gastroenterology;  Laterality: N/A;  EPIGASTRIC PAIN  --DUODENAL ULCER, HEMORRHAGIC GASTRITIS, HIATAL HERNIA    • ENDOSCOPY N/A 10/11/2022    Procedure: ESOPHAGOGASTRODUODENOSCOPY;  Surgeon: Micaela English MD;  Location: [facility] ENDOSCOPY;  Service: Gastroenterology;  Laterality: N/A;  PRE- MELENA  POST- ESOPHAGITIS   • HEMORRHOIDECTOMY     • HYSTERECTOMY     • JOINT REPLACEMENT     • KNEE ARTHROPLASTY     • MYOMECTOMY     • PACEMAKER IMPLANTATION     • SHOULDER SURGERY     • SIGMOIDOSCOPY N/A 11/2/2022    Procedure: SIGMOIDOSCOPY FLEXIBLE WITH COLD BIOPSIES AND ASPIRATE;  Surgeon: Micaela English MD;  Location: [facility] ENDOSCOPY;  Service: Gastroenterology;  Laterality: N/A;  PRE- RECTAL BLEEDING  POST- HEMORRHOIDS, DIVERTICULOSIS, COLITIS   • SINUS SURGERY     • TOE NAIL AMPUTATION  03/04/2019   • TONSILLECTOMY         Family History   Problem Relation Age of Onset   • Diabetes Mother    •  Breast cancer Sister    • Kidney cancer Sister    • Heart disease Sister    • Prostate cancer Brother    • Prostate cancer Brother    • Prostate cancer Brother    • Malig Hyperthermia Neg Hx        Social History     Socioeconomic History   • Marital status:    • Number of children: 10   • Years of education: High School   Tobacco Use   • Smoking status: Former     Packs/day: 1.50     Years: 10.00     Pack years: 15.00     Types: Cigarettes     Start date:      Quit date:      Years since quittin.9   • Smokeless tobacco: Never   • Tobacco comments:     QUIT SMOKING    Vaping Use   • Vaping Use: Never used   Substance and Sexual Activity   • Alcohol use: No   • Drug use: Never   • Sexual activity: Defer       Allergies   Allergen Reactions   • Codeine Hallucinations     Tolerates hydromorphone   • Amitriptyline Rash   • Amoxicillin-Pot Clavulanate Rash   • Aspirin Unknown - Low Severity     Patient doesn't know why   • Bactrim [Sulfamethoxazole-Trimethoprim] Rash   • Carisoprodol-Aspirin-Codeine Palpitations   • Iodinated Diagnostic Agents Rash   • Latex Rash   • Naproxen Rash   • Nsaids Unknown - Low Severity     unknown   • Soma Compound With Codeine [Carisoprodol-Aspirin-Codeine] Rash   • Sulfa Antibiotics Rash   • Tramadol Palpitations     heart races        Current Outpatient Medications on File Prior to Visit   Medication Sig Dispense Refill   • acetaminophen (TYLENOL) 500 MG tablet Take 1 tablet by mouth Every 6 (Six) Hours As Needed for Mild Pain .     • albuterol sulfate  (90 Base) MCG/ACT inhaler Inhale 2 puffs Every 4 (Four) Hours As Needed for Wheezing (or cough). 18 g 0   • ELDERBERRY PO Take 2 tablets by mouth Daily.     • magnesium oxide (MAG-OX) 400 MG tablet Take 1 tablet by mouth Daily. 90 tablet 1   • mesalamine (APRISO) 0.375 g 24 hr capsule TAKE 4 CAPSULES EVERY  capsule 3   • pantoprazole (PROTONIX) 40 MG EC tablet TAKE 1 TABLET TWICE DAILY 180 tablet 1   •  "potassium chloride 10 MEQ CR tablet Take 1 tablet by mouth Daily. 90 tablet 1   • predniSONE (DELTASONE) 10 MG tablet Take 4 tablets by mouth Daily for 5 days, THEN 3 tablets Daily for 5 days, THEN 2 tablets Daily for 5 days, THEN 1 tablet Daily for 5 days. 50 tablet 0   • vitamin B-12 (CYANOCOBALAMIN) 1000 MCG tablet Take 1 tablet by mouth Daily. 90 tablet 1   • [DISCONTINUED] docusate sodium (COLACE) 100 MG capsule Take 1 capsule by mouth At Night As Needed.       No current facility-administered medications on file prior to visit.       Review of Systems     Objective   Vital Signs:   /77   Pulse 70   Temp 97.7 °F (36.5 °C)   Ht 162.6 cm (64\")   Wt 82.1 kg (180 lb 14.4 oz)   SpO2 98%   BMI 31.05 kg/m²       Physical Exam  Vitals and nursing note reviewed.   Constitutional:       General: She is not in acute distress.     Appearance: Normal appearance. She is obese. She is not ill-appearing.   HENT:      Head: Normocephalic and atraumatic.      Right Ear: External ear normal.      Left Ear: External ear normal.   Eyes:      General: No scleral icterus.     Conjunctiva/sclera: Conjunctivae normal.      Pupils: Pupils are equal, round, and reactive to light.   Cardiovascular:      Rate and Rhythm: Normal rate and regular rhythm.      Heart sounds: Normal heart sounds.   Pulmonary:      Effort: Pulmonary effort is normal.      Breath sounds: Normal breath sounds.   Abdominal:      General: Bowel sounds are normal. There is no distension.      Palpations: Abdomen is soft.      Tenderness: There is abdominal tenderness (left lower quadrant). There is no guarding or rebound.   Musculoskeletal:      Cervical back: Normal range of motion and neck supple.   Skin:     General: Skin is warm and dry.   Neurological:      Mental Status: She is alert and oriented to person, place, and time.   Psychiatric:         Mood and Affect: Mood normal.         Behavior: Behavior normal.          Result Review :       Common " labs    Common Labs 10/11/22 11/1/22 11/1/22 11/2/22 11/2/22 11/2/22     1810 2032 0233 0233 0610   Glucose   85 99     BUN   14 11     Creatinine   0.74 0.76     Sodium   139 138     Potassium   4.3 4.8     Chloride   106 105     Calcium   10.8 (A) 10.1     Albumin   3.50      Total Bilirubin   1.4 (A)      Alkaline Phosphatase   75      AST (SGOT)   16      ALT (SGPT)   12      WBC  3.59   3.24 (A)    Hemoglobin 12.2 12.2   12.0 11.6 (A)   Hematocrit 37.0 34.6   35.3    Platelets  115 (A)   113 (A)    (A) Abnormal value                                Assessment and Plan    Diagnoses and all orders for this visit:    1. 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HCC) (Primary)  -     CBC & Differential      · Check cbc.   · Low residue diet.  · Continue prednisone taper  · Continue Apriso.      Follow Up   Return in about 3 months (around 2/14/2023) for Dr. Nohelia Chavarria dictation used throughout this note.     BELLO Mclaughlin

## 2022-11-15 LAB
DIFFERENTIAL COMMENT: ABNORMAL
EOSINOPHIL # BLD MANUAL: 0.06 10*3/MM3 (ref 0–0.4)
EOSINOPHIL NFR BLD MANUAL: 1 % (ref 0.3–6.2)
ERYTHROCYTE [DISTWIDTH] IN BLOOD BY AUTOMATED COUNT: 12.8 % (ref 12.3–15.4)
HCT VFR BLD AUTO: 41.7 % (ref 34–46.6)
HGB BLD-MCNC: 13.7 G/DL (ref 12–15.9)
LYMPHOCYTES # BLD MANUAL: 3.31 10*3/MM3 (ref 0.7–3.1)
LYMPHOCYTES NFR BLD MANUAL: 54.1 % (ref 19.6–45.3)
MCH RBC QN AUTO: 32.9 PG (ref 26.6–33)
MCHC RBC AUTO-ENTMCNC: 32.9 G/DL (ref 31.5–35.7)
MCV RBC AUTO: 100.2 FL (ref 79–97)
MONOCYTES # BLD MANUAL: 0.31 10*3/MM3 (ref 0.1–0.9)
MONOCYTES NFR BLD MANUAL: 5.1 % (ref 5–12)
NEUTROPHILS # BLD MANUAL: 2.43 10*3/MM3 (ref 1.7–7)
NEUTROPHILS NFR BLD MANUAL: 39.8 % (ref 42.7–76)
NRBC BLD AUTO-RTO: 0 /100 WBC (ref 0–0.2)
PLATELET # BLD AUTO: 184 10*3/MM3 (ref 140–450)
PLATELET BLD QL SMEAR: ABNORMAL
RBC # BLD AUTO: 4.16 10*6/MM3 (ref 3.77–5.28)
RBC MORPH BLD: ABNORMAL
WBC # BLD AUTO: 6.11 10*3/MM3 (ref 3.4–10.8)

## 2022-11-15 NOTE — PROGRESS NOTES
Please contact Brittany and inform her that her hemoglobin level has returned to normal which is a reassuring sign.

## 2022-11-16 ENCOUNTER — TELEPHONE (OUTPATIENT)
Dept: GASTROENTEROLOGY | Facility: CLINIC | Age: 87
End: 2022-11-16

## 2022-11-16 ENCOUNTER — OFFICE VISIT (OUTPATIENT)
Dept: FAMILY MEDICINE CLINIC | Facility: CLINIC | Age: 87
End: 2022-11-16

## 2022-11-16 VITALS
OXYGEN SATURATION: 99 % | TEMPERATURE: 97.7 F | WEIGHT: 182.3 LBS | BODY MASS INDEX: 31.12 KG/M2 | HEIGHT: 64 IN | SYSTOLIC BLOOD PRESSURE: 132 MMHG | DIASTOLIC BLOOD PRESSURE: 67 MMHG | HEART RATE: 71 BPM

## 2022-11-16 DIAGNOSIS — Z09 HOSPITAL DISCHARGE FOLLOW-UP: Primary | ICD-10-CM

## 2022-11-16 DIAGNOSIS — K40.91 UNILATERAL RECURRENT INGUINAL HERNIA WITHOUT OBSTRUCTION OR GANGRENE: ICD-10-CM

## 2022-11-16 DIAGNOSIS — K92.2 LOWER GI BLEED: ICD-10-CM

## 2022-11-16 PROCEDURE — 99999 PR OFFICE/OUTPT VISIT,PROCEDURE ONLY: CPT | Performed by: FAMILY MEDICINE

## 2022-11-16 PROCEDURE — 99214 OFFICE O/P EST MOD 30 MIN: CPT | Performed by: FAMILY MEDICINE

## 2022-11-16 NOTE — PROGRESS NOTES
Transitional Care Follow Up Visit  Subjective     Brittany Villeda is a 87 y.o. female who presents for a transitional care management visit.    Within 48 business hours after discharge our office contacted her via telephone to coordinate her care and needs.      I reviewed and discussed the details of that call along with the discharge summary, hospital problems, inpatient lab results, inpatient diagnostic studies, and consultation reports with Brittany.     Current outpatient and discharge medications have been reconciled for the patient.  Reviewed by: Mega Esparza MD      Date of TCM Phone Call 11/2/2022   [facility]   Date of Admission 11/1/2022   Date of Discharge 11/2/2022   Discharge Disposition Home or Self Care     Risk for Readmission (LACE) Score: 7 (11/2/2022  6:00 AM)      History of Present Illness   Course During Hospital Stay: Patient was admitted earlier this month for lower GI/rectal bleeding.  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  Inflammation Cory from rectum to sigmoid colon.  She has had this before.  She was prescribed a prednisone taper and Apriso.  She was followed up by her gastroenterologist about a week ago.  The follow-up CBC revealed a resolved anemia.  She had a mild anemia in the hospital of 11 hemoglobin.  The biopsy revealed nonspecific inflammation.  She continues mesalamine.  She has now off the prednisone in 2 days.    She is also concerned about a bulging in her left groin.  She states it started during the hospital stay.  It gets uncomfortable at times specially if she sits up.  She is had no blood in her stool and her stools are back to normal since hospitalization.  She otherwise feels well.     The following portions of the patient's history were reviewed and updated as appropriate: allergies, current medications, past family history, past medical history, past social history, past surgical history and problem  list.    Review of Systems    Objective   Physical Exam  Constitutional:       General: She is not in acute distress.     Appearance: She is not ill-appearing.   Cardiovascular:      Rate and Rhythm: Normal rate.   Pulmonary:      Effort: Pulmonary effort is normal.   Abdominal:      Comments: The abdomen is soft.  Nontender.  No distention.  She has a visible moderately sized left inguinal or femoral hernia.  Suspect inguinal.  It is nontender.  It is freely reducible with minimal pressure.  No tenderness with reduction of the hernia.   Neurological:      Mental Status: She is alert.       I reviewed the CT films myself.  I see a questionable hernia in that area on CT in the pelvis.    Assessment & Plan   Diagnoses and all orders for this visit:    1. Hospital discharge follow-up (Primary)    2. Lower GI bleed    3. Unilateral recurrent inguinal hernia without obstruction or gangrene  -     Ambulatory Referral to General Surgery      Hospital discharge follow-up for rectosigmoid colitis.  Followed by GI.  Continues mesalamine.  Finishing up the steroids.  No more rectal bleeding.    Left-sided hernia, in the inguinal area.  Freely reducible.  But moderately large.  It will likely be recurrent.  At this time she is at high risk for general anesthesia and surgery.  There is no immediate indication for surgical repair of this intermittent hernia.  However I do recommend she see a surgeon to get dialed in in case the hernia starts to get bigger, painful, or otherwise problematic.  I will see her back in the spring for Medicare wellness visit sooner as needed.

## 2022-11-16 NOTE — TELEPHONE ENCOUNTER
----- Message from BELLO Mclaughlin sent at 11/15/2022  3:37 PM EST -----  Please contact Brittany and inform her that her hemoglobin level has returned to normal which is a reassuring sign.

## 2022-11-23 ENCOUNTER — READMISSION MANAGEMENT (OUTPATIENT)
Dept: CALL CENTER | Facility: HOSPITAL | Age: 87
End: 2022-11-23

## 2022-11-23 NOTE — OUTREACH NOTE
Medical Week 3 Survey    Flowsheet Row Responses   [facility] patient discharged from? [place]   Does the patient have one of the following disease processes/diagnoses(primary or secondary)? Other   Week 3 attempt successful? Yes   Call start time 1622   Call end time 1623   Discharge diagnosis Lower GI bleed    Is patient permission given to speak with other caregiver? Yes   List who call center can speak with daughter- Amina   Person spoke with today (if not patient) and relationship daughter   Is the patient taking all medications as directed (includes completed medication regime)? Yes   Comments regarding appointments appt with surgeon on 11/30   Does the patient have a primary care provider?  Yes   Comments regarding PCP seen PCP on 11/16   Has the patient kept scheduled appointments due by today? Yes   Has home health visited the patient within 72 hours of discharge? N/A   Psychosocial issues? No   What is the patient's perception of their health status since discharge? Improving   Is the patient/caregiver able to teach back the hierarchy of who to call/visit for symptoms/problems? PCP, Specialist, Home health nurse, Urgent Care, ED, 911 Yes   Week 3 Call Completed? Yes   Graduated Yes   Did the patient feel the follow up calls were helpful during their recovery period? Yes   Was the number of calls appropriate? Yes   Graduated/Revoked comments Per daughter, patient is doing well.          CRISS THOMAS - Registered Nurse

## 2022-11-28 ENCOUNTER — TELEPHONE (OUTPATIENT)
Dept: GASTROENTEROLOGY | Facility: CLINIC | Age: 87
End: 2022-11-28

## 2022-11-28 RX ORDER — PREDNISONE 1 MG/1
TABLET ORAL
Qty: 40 TABLET | Refills: 0 | Status: SHIPPED | OUTPATIENT
Start: 2022-11-28 | End: 2022-11-30

## 2022-11-28 NOTE — TELEPHONE ENCOUNTER
----- Message from Micaela English MD sent at 11/28/2022  3:52 PM EST -----  Regarding: f/u appt  Please let the patient know that her PCP reached out to me regarding her rectal bleeding.  Please see if she can follow-up Wednesday at [phone]  [phone]

## 2022-11-28 NOTE — TELEPHONE ENCOUNTER
Call to daughter, Amina.  Advise per DR English note.     Reports has appt with surgeon's office on 11/30 @ 9am.  Asking if possible to see Dr English after that appt rather than waiting til afternoon.     Question to Dr English

## 2022-11-30 ENCOUNTER — OFFICE VISIT (OUTPATIENT)
Dept: GASTROENTEROLOGY | Facility: CLINIC | Age: 87
End: 2022-11-30

## 2022-11-30 ENCOUNTER — OFFICE VISIT (OUTPATIENT)
Dept: SURGERY | Facility: CLINIC | Age: 87
End: 2022-11-30

## 2022-11-30 VITALS
WEIGHT: 179.6 LBS | OXYGEN SATURATION: 98 % | BODY MASS INDEX: 30.66 KG/M2 | DIASTOLIC BLOOD PRESSURE: 72 MMHG | TEMPERATURE: 97.1 F | SYSTOLIC BLOOD PRESSURE: 110 MMHG | HEIGHT: 64 IN | HEART RATE: 72 BPM

## 2022-11-30 VITALS — WEIGHT: 182 LBS | BODY MASS INDEX: 29.25 KG/M2 | HEIGHT: 66 IN

## 2022-11-30 DIAGNOSIS — K51.30 ULCERATIVE RECTOSIGMOIDITIS WITHOUT COMPLICATION: ICD-10-CM

## 2022-11-30 DIAGNOSIS — K62.5 RECTAL BLEEDING: Primary | ICD-10-CM

## 2022-11-30 DIAGNOSIS — K40.90 LEFT INGUINAL HERNIA: Primary | ICD-10-CM

## 2022-11-30 PROCEDURE — 99204 OFFICE O/P NEW MOD 45 MIN: CPT | Performed by: PHYSICIAN ASSISTANT

## 2022-11-30 PROCEDURE — 99214 OFFICE O/P EST MOD 30 MIN: CPT | Performed by: INTERNAL MEDICINE

## 2022-11-30 RX ORDER — MESALAMINE 1000 MG/1
1000 SUPPOSITORY RECTAL NIGHTLY
Qty: 30 EACH | Refills: 1 | Status: SHIPPED | OUTPATIENT
Start: 2022-11-30 | End: 2023-03-13

## 2022-11-30 NOTE — PROGRESS NOTES
ASSESSMENT/PLAN:    This is an 87-year-old lady presenting with asymptomatic left inguinal hernia.  Given the worsening symptomatic nature of this hernia and its impedance on her independence I do feel that proceeding with an open left inguinal hernia under a monitored anesthesia would be acceptable.  She understands the nature of the procedure and the risks including but not limited to bleeding, infection, use of mesh, and recurrence.  Prior to undergoing this, we will obtain cardiac clearance from Dr. Culp.  All questions were answered at the time of this visit and they were willing to proceed with all recommendations.    CC:     Left inguinal hernia    HPI:    This is an 87-year-old lady presenting to the office today at the request of Dr. Mega Esparza for consultation.  For some time now she has noticed a bulge in her left groin but until November of this year it has not caused her much discomfort.  Starting in November she states the bulge began to become much larger and much more uncomfortable.  She states the bulge is getting larger in that and particularly when standing up she does experience a fair amount of discomfort, sometimes even having to sit back down for to relieve itself.  She states that the discomfort is enough that it is impeding her independence to some extent and she wishes to regain this independence.  Additionally she is concerned about bowel becoming incarcerated and this hernia and the ramifications of this.  Currently she denies abdominal pain elsewhere, nausea or vomiting.    ENDOSCOPY:   • Flex sig 11/2/2022  • EGD 5/13/2021  • Colonoscopy 10/28/2019    RADIOLOGY:   • CT scan of the abdomen pelvis: Per my review she has a small infraumbilical hernia and a moderate sized left inguinal hernia    SOCIAL HISTORY:   • Denies tobacco use  • Denies alcohol use    FAMILY HISTORY:    • Colorectal cancer: None    PREVIOUS ABDOMINAL SURGERY    • Hysterectomy  • Umbilical hernia  repair  • Cholecystectomy    OTHER SURGERY  Past Surgical History:   Procedure Laterality Date   • BACK SURGERY      lumbar fusion   • DUSTY HOLE Left 08/08/2021    Procedure: Left-sided dusty holes for evacuation of subdural hematoma;  Surgeon: Gustavo Callaway MD;  Location: [facility] MAIN OR;  Service: Neurosurgery;  Laterality: Left;   • CARDIAC ELECTROPHYSIOLOGY PROCEDURE N/A 11/07/2018    Procedure: Pacemaker DC new   [place];  Surgeon: Jesus Granda MD;  Location: [facility] CATH INVASIVE LOCATION;  Service: Cardiology   • CHOLECYSTECTOMY     • COLONOSCOPY  06/16/2014    colitis, cryptitis,  tics, NBIH, TA w/low grade dysplasia   • COLONOSCOPY N/A 10/28/2019    Procedure: COLONOSCOPY WITH COLD AND HOT POLYPECTOMIES;  Surgeon: Micaela English MD;  Location: [facility] ENDOSCOPY;  Service: Gastroenterology   • ENDOSCOPY N/A 05/13/2021    Procedure: ESOPHAGOGASTRODUODENOSCOPY with BX;  Surgeon: Stefan Mcfadden MD;  Location: [facility] ENDOSCOPY;  Service: Gastroenterology;  Laterality: N/A;  EPIGASTRIC PAIN  --DUODENAL ULCER, HEMORRHAGIC GASTRITIS, HIATAL HERNIA    • ENDOSCOPY N/A 10/11/2022    Procedure: ESOPHAGOGASTRODUODENOSCOPY;  Surgeon: Micaela English MD;  Location: [facility] ENDOSCOPY;  Service: Gastroenterology;  Laterality: N/A;  PRE- MELENA  POST- ESOPHAGITIS   • HEMORRHOIDECTOMY     • HERNIA REPAIR      umbilical   • HYSTERECTOMY     • JOINT REPLACEMENT     • KNEE ARTHROPLASTY     • MYOMECTOMY     • PACEMAKER IMPLANTATION     • SHOULDER SURGERY     • SIGMOIDOSCOPY N/A 11/02/2022    Procedure: SIGMOIDOSCOPY FLEXIBLE WITH COLD BIOPSIES AND ASPIRATE;  Surgeon: Micaela English MD;  Location: [facility] ENDOSCOPY;  Service: Gastroenterology;  Laterality: N/A;  PRE- RECTAL BLEEDING  POST- HEMORRHOIDS, DIVERTICULOSIS, COLITIS   • SINUS SURGERY     • TOE NAIL AMPUTATION  03/04/2019   • TONSILLECTOMY         PAST MEDICAL HISTORY:    Past Medical History:   Diagnosis Date   • Acute UTI (urinary tract  infection) 04/08/2021   • Anemia    • Arrhythmia    • Arthritis    • Asthma    • Bradycardia    • Chest pain    • Choledocholithiasis 05/09/2021   • Colitis    • COPD (chronic obstructive pulmonary disease) ([facility])    • Diastolic dysfunction    • Essential hypertension 05/12/2016   • GERD (gastroesophageal reflux disease)    • Heart block    • Hypertension    • Hypertensive heart disease    • Hyperthyroidism    • Kidney stone    • Leukopenia    • Low back pain    • MGUS (monoclonal gammopathy of unknown significance)    • Nephrolithiasis    • Obesity    • Paroxysmal atrial fibrillation ([facility])    • Peptic ulceration    • Persistent atrial fibrillation ([facility])    • PSVT (paroxysmal supraventricular tachycardia) ([facility])    • Pulmonary hypertension ([facility])    • Rectal bleed    • Systemic hypertension    • Trifascicular block    • 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[facility])    • Ventricular tachycardia     nonsustained   • Vertigo        MEDICATIONS:     Current Outpatient Medications:   •  acetaminophen (TYLENOL) 500 MG tablet, Take 1 tablet by mouth Every 6 (Six) Hours As Needed for Mild Pain ., Disp: , Rfl:   •  albuterol sulfate  (90 Base) MCG/ACT inhaler, Inhale 2 puffs Every 4 (Four) Hours As Needed for Wheezing (or cough)., Disp: 18 g, Rfl: 0  •  ELDERBERRY PO, Take 2 tablets by mouth Daily., Disp: , Rfl:   •  magnesium oxide (MAG-OX) 400 MG tablet, Take 1 tablet by mouth Daily., Disp: 90 tablet, Rfl: 1  •  mesalamine (APRISO) 0.375 g 24 hr capsule, TAKE 4 CAPSULES EVERY DAY, Disp: 360 capsule, Rfl: 3  •  pantoprazole (PROTONIX) 40 MG EC tablet, TAKE 1 TABLET TWICE DAILY, Disp: 180 tablet, Rfl: 1  •  potassium chloride 10 MEQ CR tablet, Take 1 tablet by mouth Daily., Disp: 90 tablet, Rfl: 1  •  predniSONE (DELTASONE) 5 MG tablet, Take 4 tablets by mouth Daily for 4 days, THEN 3 tablets Daily for 4 days, THEN 2 tablets Daily for 4 days, THEN 1 tablet Daily for 4 days., Disp: 40 tablet, Rfl: 0  •  vitamin  "B-12 (CYANOCOBALAMIN) 1000 MCG tablet, Take 1 tablet by mouth Daily., Disp: 90 tablet, Rfl: 1    ALLERGIES:   Allergies   Allergen Reactions   • Codeine Hallucinations     Tolerates hydromorphone   • Amitriptyline Rash   • Amoxicillin-Pot Clavulanate Rash   • Aspirin Unknown - Low Severity     Patient doesn't know why   • Bactrim [Sulfamethoxazole-Trimethoprim] Rash   • Carisoprodol-Aspirin-Codeine Palpitations   • Iodinated Diagnostic Agents Rash   • Latex Rash   • Naproxen Rash   • Nsaids Unknown - Low Severity     unknown   • Soma Compound With Codeine [Carisoprodol-Aspirin-Codeine] Rash   • Sulfa Antibiotics Rash   • Tramadol Palpitations     heart races        PHYSICAL EXAM:   • Constitutional: Well-developed well-nourished, chronically ill-appearing, use of a walker, no acute distress  • Vital signs:   o Height 66\"  o Weight 182  o BMI 29.4  • Neck: Supple, no palpable mass, trachea midline  • Respiratory: Clear to auscultation, normal inspiratory effort  • Cardiovascular: Regular rate, no murmur, no carotid bruit  • Gastrointestinal: Soft, nontender  • : Moderate sized left inguinal hernia, able to be reduced, mild tenderness  • Psychiatric: Alert and oriented ×3, normal affect         Silviano Lopez PA-C    "

## 2022-11-30 NOTE — PROGRESS NOTES
Subjective   Chief Complaint   Patient presents with   • ulcerative rectosigmoiditis   • Rectal Bleeding     clots       Brittany Villeda is a  87 y.o. female here for a follow up visit for ulcerative rectosigmoiditis, rectal bleeding.     She does similar presentation with admission earlier in November.  She underwent a flexible sigmoidoscopy at that time which did show some mild ulcerative proctosigmoiditis and she was treated with prednisone.  she is managed chronically with Apriso 1.5 g daily.    She finished steroids on the 19th and she developed bleeding again thereafter.  She reports that she has no diarrhea or urgency.  She is not straining to have a bowel movement.  Her stools are soft.  Appetite is good no abdominal pain.    Last colonoscopy was in October 2019-she had diverticulosis, polyps and internal hemorrhoids at that time.  HPI  Past Medical History:   Diagnosis Date   • Acute UTI (urinary tract infection) 04/08/2021   • Anemia    • Arrhythmia    • Arthritis    • Asthma    • Bradycardia    • Chest pain    • Choledocholithiasis 05/09/2021   • Colitis    • COPD (chronic obstructive pulmonary disease) ([facility])    • Diastolic dysfunction    • Essential hypertension 05/12/2016   • GERD (gastroesophageal reflux disease)    • Heart block    • Hernia    • Hypertension    • Hypertensive heart disease    • Hyperthyroidism    • Kidney stone    • Leukopenia    • Low back pain    • MGUS (monoclonal gammopathy of unknown significance)    • Nephrolithiasis    • Obesity    • Paroxysmal atrial fibrillation (HCC)    • Peptic ulceration    • Persistent atrial fibrillation ([facility])    • PSVT (paroxysmal supraventricular tachycardia) ([facility])    • Pulmonary hypertension ([facility])    • Rectal bleed    • Systemic hypertension    • Trifascicular block    • 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[facility])    • Ventricular tachycardia     nonsustained   • Vertigo      Past Surgical History:   Procedure Laterality Date   • BACK  SURGERY      lumbar fusion   • DUSTY HOLE Left 08/08/2021    Procedure: Left-sided dusty holes for evacuation of subdural hematoma;  Surgeon: Gustavo Callaway MD;  Location: [facility] MAIN OR;  Service: Neurosurgery;  Laterality: Left;   • CARDIAC ELECTROPHYSIOLOGY PROCEDURE N/A 11/07/2018    Procedure: Pacemaker DC new   [place];  Surgeon: Jesus Granda MD;  Location: [facility] CATH INVASIVE LOCATION;  Service: Cardiology   • CHOLECYSTECTOMY     • COLONOSCOPY  06/16/2014    colitis, cryptitis,  tics, NBIH, TA w/low grade dysplasia   • COLONOSCOPY N/A 10/28/2019    Procedure: COLONOSCOPY WITH COLD AND HOT POLYPECTOMIES;  Surgeon: Micaela English MD;  Location: [facility] ENDOSCOPY;  Service: Gastroenterology   • ENDOSCOPY N/A 05/13/2021    Procedure: ESOPHAGOGASTRODUODENOSCOPY with BX;  Surgeon: Stefan Mcfadden MD;  Location: [facility] ENDOSCOPY;  Service: Gastroenterology;  Laterality: N/A;  EPIGASTRIC PAIN  --DUODENAL ULCER, HEMORRHAGIC GASTRITIS, HIATAL HERNIA    • ENDOSCOPY N/A 10/11/2022    Procedure: ESOPHAGOGASTRODUODENOSCOPY;  Surgeon: Micaela English MD;  Location: [facility] ENDOSCOPY;  Service: Gastroenterology;  Laterality: N/A;  PRE- MELENA  POST- ESOPHAGITIS   • HEMORRHOIDECTOMY     • HERNIA REPAIR      umbilical   • HYSTERECTOMY     • JOINT REPLACEMENT     • KNEE ARTHROPLASTY     • MYOMECTOMY     • PACEMAKER IMPLANTATION     • SHOULDER SURGERY     • SIGMOIDOSCOPY N/A 11/02/2022    Procedure: SIGMOIDOSCOPY FLEXIBLE WITH COLD BIOPSIES AND ASPIRATE;  Surgeon: Micaela English MD;  Location: [facility] ENDOSCOPY;  Service: Gastroenterology;  Laterality: N/A;  PRE- RECTAL BLEEDING  POST- HEMORRHOIDS, DIVERTICULOSIS, COLITIS   • SINUS SURGERY     • TOE NAIL AMPUTATION  03/04/2019   • TONSILLECTOMY         Current Outpatient Medications:   •  acetaminophen (TYLENOL) 500 MG tablet, Take 1 tablet by mouth Every 6 (Six) Hours As Needed for Mild Pain ., Disp: , Rfl:   •  albuterol sulfate  (90 Base)  MCG/ACT inhaler, Inhale 2 puffs Every 4 (Four) Hours As Needed for Wheezing (or cough)., Disp: 18 g, Rfl: 0  •  ELDERBERRY PO, Take 2 tablets by mouth Daily., Disp: , Rfl:   •  magnesium oxide (MAG-OX) 400 MG tablet, Take 1 tablet by mouth Daily., Disp: 90 tablet, Rfl: 1  •  mesalamine (APRISO) 0.375 g 24 hr capsule, TAKE 4 CAPSULES EVERY DAY, Disp: 360 capsule, Rfl: 3  •  pantoprazole (PROTONIX) 40 MG EC tablet, TAKE 1 TABLET TWICE DAILY, Disp: 180 tablet, Rfl: 1  •  potassium chloride 10 MEQ CR tablet, Take 1 tablet by mouth Daily., Disp: 90 tablet, Rfl: 1  •  vitamin B-12 (CYANOCOBALAMIN) 1000 MCG tablet, Take 1 tablet by mouth Daily., Disp: 90 tablet, Rfl: 1  •  mesalamine (Canasa) 1000 MG suppository, Insert 1 suppository into the rectum Every Night., Disp: 30 each, Rfl: 1  PRN Meds:.  Allergies   Allergen Reactions   • Codeine Hallucinations     Tolerates hydromorphone   • Amitriptyline Rash   • Amoxicillin-Pot Clavulanate Rash   • Aspirin Unknown - Low Severity     Patient doesn't know why   • Bactrim [Sulfamethoxazole-Trimethoprim] Rash   • Carisoprodol-Aspirin-Codeine Palpitations   • Iodinated Diagnostic Agents Rash   • Latex Rash   • Naproxen Rash   • Nsaids Unknown - Low Severity     unknown   • Soma Compound With Codeine [Carisoprodol-Aspirin-Codeine] Rash   • Sulfa Antibiotics Rash   • Tramadol Palpitations     heart races      Social History     Socioeconomic History   • Marital status:    • Number of children: 10   • Years of education: High School   Tobacco Use   • Smoking status: Former     Packs/day: 1.50     Years: 10.00     Pack years: 15.00     Types: Cigarettes     Start date:      Quit date:      Years since quittin.9   • Smokeless tobacco: Never   • Tobacco comments:     QUIT SMOKING    Vaping Use   • Vaping Use: Never used   Substance and Sexual Activity   • Alcohol use: No   • Drug use: Never   • Sexual activity: Defer     Family History   Problem Relation Age of  Onset   • Diabetes Mother    • Breast cancer Sister    • Kidney cancer Sister    • Heart disease Sister    • Prostate cancer Brother    • Prostate cancer Brother    • Prostate cancer Brother    • Malig Hyperthermia Neg Hx      Review of Systems   Constitutional: Negative for appetite change and unexpected weight change.   Gastrointestinal: Positive for blood in stool. Negative for abdominal pain and constipation.     Vitals:    11/30/22 1544   BP: 110/72   Pulse: 72   Temp: 97.1 °F (36.2 °C)   SpO2: 98%         11/30/22  1544   Weight: 81.5 kg (179 lb 9.6 oz)       Objective   Physical Exam  Constitutional:       Appearance: Normal appearance. She is well-developed.   HENT:      Head: Normocephalic and atraumatic.   Eyes:      General: No scleral icterus.     Conjunctiva/sclera: Conjunctivae normal.   Pulmonary:      Effort: Pulmonary effort is normal.   Abdominal:      General: There is no distension.      Palpations: Abdomen is soft.   Musculoskeletal:      Cervical back: Normal range of motion and neck supple.   Skin:     General: Skin is warm and dry.   Neurological:      Mental Status: She is alert.   Psychiatric:         Mood and Affect: Mood normal.         Behavior: Behavior normal.       No radiology results for the last 7 days    Assessment & Plan   Diagnoses and all orders for this visit:    Rectal bleeding  -     Hemoglobin & Hematocrit, Blood    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HCC)    Other orders  -     mesalamine (Canasa) 1000 MG suppository; Insert 1 suppository into the rectum Every Night.      Plan:  · Hold on steroids for now she is really having no other symptoms.  We will try to get Canasa suppositories as this is worked well for her in the past and she has been able to get that with her insurance.  If we are unable to get Canasa, can do Preparation H suppositories 1-2 times per day.  · Check H&H today  · Discussed with her and her daughter that in the setting of ulcerative  rectosigmoiditis, bleeding is not uncommon.  Important that she is not losing too much blood and if it is persistent then we need to consider another course of steroids as she did respond to this in the past.  · She is had a recent flexible sigmoidoscopy and this bleeding certainly seems rectal in etiology.  I do not think that she warrants a full colonoscopy at this time and I think it would be difficult for her given her age and immobility.

## 2022-12-01 LAB
HCT VFR BLD AUTO: 38 % (ref 34–46.6)
HGB BLD-MCNC: 12.6 G/DL (ref 12–15.9)

## 2022-12-05 ENCOUNTER — TELEPHONE (OUTPATIENT)
Dept: SURGERY | Facility: CLINIC | Age: 87
End: 2022-12-05

## 2022-12-05 ENCOUNTER — PREP FOR SURGERY (OUTPATIENT)
Dept: OTHER | Facility: HOSPITAL | Age: 87
End: 2022-12-05

## 2022-12-05 DIAGNOSIS — K40.90 LEFT INGUINAL HERNIA: Primary | ICD-10-CM

## 2022-12-05 RX ORDER — CEFAZOLIN SODIUM 2 G/100ML
2 INJECTION, SOLUTION INTRAVENOUS ONCE
Status: CANCELLED | OUTPATIENT
Start: 2022-12-05 | End: 2022-12-05

## 2022-12-05 NOTE — TELEPHONE ENCOUNTER
I spoke with the patient's daughter, Amina, to schedule surgery. I gave her some dates in January and she will call back once she discusses with her sisters.

## 2022-12-06 ENCOUNTER — TELEPHONE (OUTPATIENT)
Dept: GASTROENTEROLOGY | Facility: CLINIC | Age: 87
End: 2022-12-06

## 2022-12-06 NOTE — TELEPHONE ENCOUNTER
"Called pt and spoke with pt's daughter and advised of Dr English's note.   Verb understanding.       She advised her mother was able to get the supp without issue.  She advised that her mother is doing \"pretty good\".      She is asking if her mother should take the prednisone that Dr Esparza ordered.   Also she reports that her mother has a sore between her buttocks and is asking what should she do about this. She states she does not want this to turn into a decubitus.   Advised will send message to Dr English.   "

## 2022-12-06 NOTE — TELEPHONE ENCOUNTER
----- Message from Micaela English MD sent at 12/1/2022 12:33 PM EST -----  Hemoglobin is normal - no significant change from prior - was she able to get the canasa supp?

## 2022-12-07 NOTE — TELEPHONE ENCOUNTER
I do not think that she needs to take the steroid prescribed by Dr. Esparza at this time.  I recommend that she follow-up with Dr. Esparza regarding the wound on her buttocks

## 2022-12-22 ENCOUNTER — HOME HEALTH ADMISSION (OUTPATIENT)
Dept: HOME HEALTH SERVICES | Facility: HOME HEALTHCARE | Age: 87
End: 2022-12-22

## 2022-12-22 DIAGNOSIS — L98.499 SKIN ULCER, UNSPECIFIED ULCER STAGE: Primary | ICD-10-CM

## 2022-12-27 ENCOUNTER — TELEPHONE (OUTPATIENT)
Dept: GASTROENTEROLOGY | Facility: CLINIC | Age: 87
End: 2022-12-27

## 2022-12-27 NOTE — TELEPHONE ENCOUNTER
Caller: Amina Diana    Relationship: DAUGHTER    Best call back number: [phone]    What is the best time to reach you: ANY    What was the call regarding: Amina Diana DAUGHTER OF PALOMA BLACKWELL CALLED IN WANTING TO RESCHEDULE HER MOTHERS APPT 12/28/2022 TO A TELEHEALTH APPT      Do you require a callback: YES

## 2022-12-30 NOTE — TELEPHONE ENCOUNTER
"Fax received from [facility] that \"pt has allergy to aspirin on file, so this med (mesalamine er 0.375 gm) contraindicated.  Please discuss with her\".    Message to Dr English.     "
Call to [facility] @ [phone] and spoke with Pablo Travis.  Advise per Dr English that pt was on this med previously for years - please fill.  Pablo [facility]ALLO[facility]KAYLIN.   
She was on this med previously for years - pls fill  
with patient

## 2023-01-20 ENCOUNTER — OFFICE VISIT (OUTPATIENT)
Dept: FAMILY MEDICINE CLINIC | Facility: CLINIC | Age: 88
End: 2023-01-20
Payer: MEDICARE

## 2023-01-20 VITALS
TEMPERATURE: 97.5 F | OXYGEN SATURATION: 100 % | BODY MASS INDEX: 32.15 KG/M2 | SYSTOLIC BLOOD PRESSURE: 111 MMHG | HEIGHT: 64 IN | DIASTOLIC BLOOD PRESSURE: 67 MMHG | WEIGHT: 188.3 LBS | HEART RATE: 67 BPM

## 2023-01-20 DIAGNOSIS — L98.491 SUPERFICIAL ULCER OF SKIN: Primary | ICD-10-CM

## 2023-01-20 PROCEDURE — 99213 OFFICE O/P EST LOW 20 MIN: CPT | Performed by: NURSE PRACTITIONER

## 2023-01-20 NOTE — PROGRESS NOTES
"Chief Complaint  Wound Check (Wound on right buttock x 1 month )    Subjective        Brittany Villeda presents to [facility]  History of Present Illness the patient to be.  She is here with her daughter for evaluation of 2 new superficial ulcerations on right inner buttock that her daughter noticed this past Tuesday.  Patient reports they are mildly painful.  She previously had a wound on her coccyx that healed up.  They had Silvadene at home and have been applying and think it might be a little bit better but they are concerned since that she wears briefs due to incontinence and has relative immobility and fear for worsening.  They would like home health care to see patient for wound care.    Objective   Vital Signs:  /67   Pulse 67   Temp 97.5 °F (36.4 °C) (Temporal)   Ht 162.6 cm (64\")   Wt 85.4 kg (188 lb 4.8 oz)   SpO2 100%   BMI 32.32 kg/m²   Estimated body mass index is 32.32 kg/m² as calculated from the following:    Height as of this encounter: 162.6 cm (64\").    Weight as of this encounter: 85.4 kg (188 lb 4.8 oz).             Physical Exam  Constitutional:       Appearance: She is not ill-appearing or toxic-appearing.      Comments: Frail   HENT:      Head: Normocephalic and atraumatic.   Musculoskeletal:      Comments: Generalized weakness, walker for ambulation, needs assist from sitting to standing but was steady to stand holding walker for evaluation   Skin:     Comments: Right inner buttock  Shallow ulcers x 2    Lower ulceration is appr 0.7cm x 1.5cm with light pink wound bed, surrounding granulation    Upper wound is 0.5 cm x 1.0 cm   Neurological:      Mental Status: She is alert and oriented to person, place, and time.   Psychiatric:         Mood and Affect: Mood normal.         Behavior: Behavior normal.      Comments: Very pleasant        Result Review :                   Assessment and Plan   Diagnoses and all orders for this visit:    1. " Superficial ulcer of skin (HCC) (Primary)  -     Ambulatory Referral to Home Health    Will order home health care for patient.  If they do not hear back that patient will be seen by early next week will let me know.  Recommend follow-up with PCP.  They can continue Silvadene until wound care evaluation.  Aware of signs and symptoms that require return to clinic.         Follow Up   Return if symptoms worsen or fail to improve.  Patient was given instructions and counseling regarding her condition or for health maintenance advice. Please see specific information pulled into the AVS if appropriate.

## 2023-01-24 ENCOUNTER — TELEPHONE (OUTPATIENT)
Dept: FAMILY MEDICINE CLINIC | Facility: CLINIC | Age: 88
End: 2023-01-24

## 2023-01-24 NOTE — TELEPHONE ENCOUNTER
perla     Caller: [facility]    Relationship: Home Health    Best call back number: [phone]    What orders are you requesting (i.e. lab or imaging): SKILLED NURSING ORDERS     In what timeframe would the patient need to come in: AS SOON AS POSSIBLE     Where will you receive your lab/imaging services: AT HOME    Additional notes: 2X WEEK FOR 1 WEEK AND 1X WEEK FOR 2 WEEKS FOR WOUND CARE

## 2023-01-27 RX ORDER — LANOLIN ALCOHOL/MO/W.PET/CERES
CREAM (GRAM) TOPICAL
Qty: 90 TABLET | Refills: 1 | Status: SHIPPED | OUTPATIENT
Start: 2023-01-27

## 2023-01-27 RX ORDER — POTASSIUM CHLORIDE 750 MG/1
TABLET, EXTENDED RELEASE ORAL
Qty: 90 TABLET | Refills: 1 | Status: SHIPPED | OUTPATIENT
Start: 2023-01-27

## 2023-01-27 RX ORDER — PANTOPRAZOLE SODIUM 40 MG/1
TABLET, DELAYED RELEASE ORAL
Qty: 180 TABLET | Refills: 1 | Status: SHIPPED | OUTPATIENT
Start: 2023-01-27

## 2023-01-27 NOTE — TELEPHONE ENCOUNTER
Rx Refill Note  Requested Prescriptions     Pending Prescriptions Disp Refills   • pantoprazole (PROTONIX) 40 MG EC tablet [Pharmacy Med Name: PANTOPRAZOLE SODIUM 40 MG Tablet Delayed Release] 180 tablet 1     Sig: TAKE 1 TABLET TWICE DAILY   • Magnesium Oxide 400 (240 Mg) MG tablet [Pharmacy Med Name: MAGNESIUM OXIDE 400 (240 Mg) MG Tablet] 90 tablet 1     Sig: TAKE 1 TABLET EVERY DAY   • potassium chloride (K-DUR,KLOR-CON) 10 MEQ CR tablet [Pharmacy Med Name: POTASSIUM CHLORIDE ER 10 MEQ Tablet Extended Release] 90 tablet 1     Sig: TAKE 1 TABLET EVERY DAY   • vitamin B-12 (CYANOCOBALAMIN) 1000 MCG tablet [Pharmacy Med Name: VITAMIN B-12 1000 MCG Tablet] 90 tablet 1     Sig: TAKE 1 TABLET EVERY DAY      Last office visit with prescribing clinician: 11/16/2022   Last telemedicine visit with prescribing clinician: 4/18/2023   Next office visit with prescribing clinician: 4/18/2023                         Would you like a call back once the refill request has been completed: [] Yes [] No    If the office needs to give you a call back, can they leave a voicemail: [] Yes [] No    Gordon Monroy  01/27/23, 14:05 EST

## 2023-01-30 NOTE — PROGRESS NOTES
After obtaining consent, and per orders of LONG [place] injection  by Catalina Shafer MA. Patient instructed to remain in clinic for 20 minutes afterwards, and to report any adverse reaction to me immediately. Administrations This Visit       cefTRIAXone (ROCEPHIN) 1,000 mg in lidocaine 1 % 2.86 mL IM Injection       Admin Date  01/30/2023  15:37 Action  Given Dose  1,000 mg Route  IntraMUSCular Site  Dorsogluteal Right Administered By  Catalina Shafer MA    Ordering Provider: EFREN Kimbrough CNP    NDC: 6603-5317-85    Lot#: [id number]    : Efraín Goodness    Patient Supplied?: No                    Patient tolerated well. Anticoagulation Clinic Progress Note    Anticoagulation Summary  As of 2021    INR goal:  2.0-3.0   TTR:  69.0 % (2.6 y)   INR used for dosin.70 (2021)   Warfarin maintenance plan:  5 mg every Mon, Thu, Sat; 2.5 mg all other days   Weekly warfarin total:  25 mg   No change documented:  Rusty Interiano RPH   Plan last modified:  Rusty Interiano RPH (2021)   Next INR check:  2021   Priority:  Maintenance   Target end date:  Indefinite    Indications    Atrial fibrillation (CMS/HCC) [I48.91]             Anticoagulation Episode Summary     INR check location:      Preferred lab:      Send INR reminders to:   ANJU RANDALL [facility]    Comments:        Anticoagulation Care Providers     Provider Role Specialty Phone number    Mary Grace Culp MD Referring Cardiology [phone]          INR History:  Anticoagulation Monitoring 2021   INR 1.20 1.30 2.70   INR Date 2021   INR Goal 2.0-3.0 2.0-3.0 2.0-3.0   Trend Same Same Same   Last Week Total 25 mg 30 mg 37.5 mg   Next Week Total 30 mg 32.5 mg 25 mg   Sun - 5 mg () -   Mon - - -   Tue - - 2.5 mg   Wed 7.5 mg () - 2.5 mg   Thu 5 mg - 5 mg   Fri - 7.5 mg () -   Sat - 5 mg -   Visit Report - - -   Some recent data might be hidden       Plan:  1. INR is Therapeutic today- see above in Anticoagulation Summary.   Provided instructions to Maria Antonia with [facility] to Change their warfarin regimen- see above in Anticoagulation Summary. Although she has received much more warfarin the past 5 days, will trial 5 mg Mon/Thurs/Sat, 2.5 mg AOD to ensure INR does not become supratherapeutic in light of recent admission w/ hemorrhagic gastritis  2. Follow up in 3 days      Rusty Interiano RPH

## 2023-02-06 ENCOUNTER — TELEPHONE (OUTPATIENT)
Dept: FAMILY MEDICINE CLINIC | Facility: CLINIC | Age: 88
End: 2023-02-06

## 2023-02-06 RX ORDER — NYSTATIN 100000 U/G
1 CREAM TOPICAL 2 TIMES DAILY
Qty: 30 G | Refills: 0 | Status: SHIPPED | OUTPATIENT
Start: 2023-02-06 | End: 2023-02-17

## 2023-02-06 NOTE — TELEPHONE ENCOUNTER
Caller: [facility]    Best call back number: [phone]     HUB RELAYED Okay for urinalysis with micro if indicated.    NO CALLBACK NECESSARY. NURSE WILL GET SAMPLE

## 2023-02-06 NOTE — TELEPHONE ENCOUNTER
Caller: FANNY    Relationship: [facility].    Best call back number: [phone]    What orders are you requesting (i.e. lab or imaging):URINEALYSIS AND PRN VISIT TO SEE PATIENT TODAY    In what timeframe would the patient need to come in: ASAP    Where will you receive your lab/imaging services: AT HOME    Additional notes: FANYN FROM [facility] IS CALLING PATIENT HAS SYMPTOMS OF UTI AND SHE IS NEEDING SOME VERBAL ORDERS.

## 2023-02-09 ENCOUNTER — TELEPHONE (OUTPATIENT)
Dept: FAMILY MEDICINE CLINIC | Facility: CLINIC | Age: 88
End: 2023-02-09

## 2023-02-09 NOTE — TELEPHONE ENCOUNTER
Caller: [facility]--FANNY     Relationship:     Best call back number: [phone]    What orders are you requesting (i.e. lab or imaging): VRBAL NURSING VISIT FOR NEXT WEEK.   1 VISIT PER WEEK FOR 1 WEEK     In what timeframe would the patient need to come in:     Where will you receive your lab/imaging services:     Additional notes:

## 2023-02-10 RX ORDER — NITROFURANTOIN 25; 75 MG/1; MG/1
100 CAPSULE ORAL 2 TIMES DAILY
Qty: 10 CAPSULE | Refills: 0 | Status: SHIPPED | OUTPATIENT
Start: 2023-02-10 | End: 2023-02-17

## 2023-02-10 NOTE — TELEPHONE ENCOUNTER
sabas ugalde she states that she feels better but her vaginal area hurts and burns when she uses the restroom.

## 2023-02-10 NOTE — TELEPHONE ENCOUNTER
Thank you.  I have sent Macrobid/nitrofurantoin twice a day for 5 days to her [facility] pharmacy.  If she is not improving please let us know.  With very severe symptoms such as fever, vomiting, or severe pain she must seek medical attention.

## 2023-02-10 NOTE — TELEPHONE ENCOUNTER
Spw harry arteaga with [facility]. Gave verbal orders for nursing visit for next week. Pt had a urine test come back positive for ecoli labs were placed on pcp desk. Waiting for further instructions

## 2023-02-17 ENCOUNTER — TELEPHONE (OUTPATIENT)
Dept: FAMILY MEDICINE CLINIC | Facility: CLINIC | Age: 88
End: 2023-02-17

## 2023-02-17 ENCOUNTER — OFFICE VISIT (OUTPATIENT)
Dept: FAMILY MEDICINE CLINIC | Facility: CLINIC | Age: 88
End: 2023-02-17
Payer: MEDICARE

## 2023-02-17 VITALS
DIASTOLIC BLOOD PRESSURE: 80 MMHG | HEIGHT: 64 IN | SYSTOLIC BLOOD PRESSURE: 138 MMHG | OXYGEN SATURATION: 97 % | WEIGHT: 194.5 LBS | TEMPERATURE: 96.8 F | RESPIRATION RATE: 18 BRPM | BODY MASS INDEX: 33.2 KG/M2 | HEART RATE: 85 BPM

## 2023-02-17 DIAGNOSIS — N30.00 ACUTE CYSTITIS WITHOUT HEMATURIA: Primary | ICD-10-CM

## 2023-02-17 LAB
BILIRUB BLD-MCNC: NEGATIVE MG/DL
CLARITY, POC: CLEAR
COLOR UR: ABNORMAL
EXPIRATION DATE: ABNORMAL
GLUCOSE UR STRIP-MCNC: NEGATIVE MG/DL
KETONES UR QL: NEGATIVE
LEUKOCYTE EST, POC: NEGATIVE
Lab: ABNORMAL
NITRITE UR-MCNC: NEGATIVE MG/ML
PH UR: 7 [PH] (ref 5–8)
PROT UR STRIP-MCNC: NEGATIVE MG/DL
RBC # UR STRIP: ABNORMAL /UL
SP GR UR: 1.02 (ref 1–1.03)
UROBILINOGEN UR QL: ABNORMAL

## 2023-02-17 PROCEDURE — 99213 OFFICE O/P EST LOW 20 MIN: CPT | Performed by: NURSE PRACTITIONER

## 2023-02-17 PROCEDURE — 81003 URINALYSIS AUTO W/O SCOPE: CPT | Performed by: NURSE PRACTITIONER

## 2023-02-17 RX ORDER — PHENAZOPYRIDINE HYDROCHLORIDE 95 MG/1
95 TABLET ORAL 3 TIMES DAILY PRN
Qty: 6 TABLET | Refills: 0 | Status: SHIPPED | OUTPATIENT
Start: 2023-02-17 | End: 2023-03-07

## 2023-02-17 RX ORDER — NITROFURANTOIN 25; 75 MG/1; MG/1
100 CAPSULE ORAL 2 TIMES DAILY
Qty: 10 CAPSULE | Refills: 0 | Status: SHIPPED | OUTPATIENT
Start: 2023-02-17 | End: 2023-03-07

## 2023-02-17 NOTE — PROGRESS NOTES
"Chief Complaint  Urinary Tract Infection (Follow up, still having burning, frequency. X 2m)    Subjective        Brittany Villeda presents to [facility]  History of Present Illness    Brittany Villeda is an 87-year-old female who presents today for UTI, burning and frequency for the past 2 months. She was started on Macrobid 2 times daily for 5 days. She is accompanied by an adult female to this visit.     The patient states her symptoms did not improve after taking Macrobid. She denies vaginal discharge or bleeding, and hematuria. She does report spots of blood with her bowel movements when she \"wipes the lip part of my private.\" She states the blood is brownish red color. Her symptoms have been occurring for 1.5 months or longer. When urinating she will experience a burning sensation and reports burning when she is not urinating as well. She has lower abdominal pain and reports she has a hernia. She denies pain her back, nausea, vomiting, diarrhea, or constipation. She states she is drinking plenty of water.     She states she has been using Aquaphor on her labia minora to keep herself comfortable.     Objective   Vital Signs:  /80 (BP Location: Left arm, Patient Position: Sitting, Cuff Size: Adult)   Pulse 85   Temp 96.8 °F (36 °C) (Temporal)   Resp 18   Ht 162.6 cm (64.02\")   Wt 88.2 kg (194 lb 8 oz)   SpO2 97%   BMI 33.37 kg/m²   Estimated body mass index is 33.37 kg/m² as calculated from the following:    Height as of this encounter: 162.6 cm (64.02\").    Weight as of this encounter: 88.2 kg (194 lb 8 oz).          Physical Exam  Constitutional:       General: She is not in acute distress.     Appearance: She is well-developed. She is not diaphoretic.   Cardiovascular:      Rate and Rhythm: Normal rate and regular rhythm.      Heart sounds: Normal heart sounds. No murmur heard.    No friction rub. No gallop.   Pulmonary:      Effort: Pulmonary effort is normal. " No respiratory distress.      Breath sounds: Normal breath sounds. No wheezing or rales.   Abdominal:      General: Bowel sounds are normal. There is no distension.      Palpations: Abdomen is soft.      Tenderness: There is no abdominal tenderness.   Musculoskeletal:      Cervical back: Neck supple.   Skin:     General: Skin is warm and dry.   Neurological:      Mental Status: She is alert and oriented to person, place, and time.        Result Review :                Assessment and Plan   Diagnoses and all orders for this visit:    1. Acute cystitis without hematuria (Primary)  -     POC Urinalysis Dipstick, Automated  -     Urine Culture - Urine, Urine, Clean Catch    Other orders  -     nitrofurantoin, macrocrystal-monohydrate, (Macrobid) 100 MG capsule; Take 1 capsule by mouth 2 (Two) Times a Day.  Dispense: 10 capsule; Refill: 0  -     phenazopyridine (PYRIDIUM) 95 MG tablet; Take 1 tablet by mouth 3 (Three) Times a Day As Needed for Bladder Spasms.  Dispense: 6 tablet; Refill: 0      1. Urinary tract infection symptoms   - The patient presents with UTI symptoms, still has burning. She has been using Aquaphor on her labia, recommend discontinuing this and starting the nystatin cream that Dr. Esparza has ordered. She has completed 5 days of the Macrobid, this will be continued for 5 more days while awaiting urine culture results. Changes to medication will be made as needed pending results. She denies flank pain, suprapubic tenderness, fever, chills, nausea, or vomiting.          Follow Up   No follow-ups on file.  Patient was given instructions and counseling regarding her condition or for health maintenance advice. Please see specific information pulled into the AVS if appropriate.       Transcribed from ambient dictation for SHAHID Ritchie by Geetha Turner.  02/17/23   13:13 EST    Patient or patient representative verbalized consent to the visit recording.  I have personally performed the services  described in this document as transcribed by the above individual, and it is both accurate and complete.

## 2023-02-17 NOTE — TELEPHONE ENCOUNTER
Caller: FANNY    Relationship: Home Health    Best call back number: [phone]    What was the call regarding: FANNY WITH [facility] CALLED TO UPDATE THAT PATIENT HAD BEEN DISCHARGED FROM HOME HEALTH    Do you require a callback: NO

## 2023-02-19 LAB
BACTERIA UR CULT: NORMAL
BACTERIA UR CULT: NORMAL

## 2023-02-21 ENCOUNTER — TELEPHONE (OUTPATIENT)
Dept: FAMILY MEDICINE CLINIC | Facility: CLINIC | Age: 88
End: 2023-02-21

## 2023-02-21 NOTE — TELEPHONE ENCOUNTER
Caller: Concepcion Diana    Relationship: Emergency Contact    Best call back number: [phone]    What was the call regarding: PATIENT'S DAUGHTER HAS A COUPLE MISSED CALLS FROM THE OFFICE, SHE BELIEVES IT IS REGARDING THE PATIENT.     [place] BUT PLEASE CALL BACK TO ADVISE WHAT THE CALL WAS REGARDING.     Do you require a callback: YES, ATTEMPTED WARM TRANSFER, NO ANSWER.

## 2023-02-21 NOTE — TELEPHONE ENCOUNTER
SPW Narda Cam PT DAUGHTER, WENT OVER LAB RESULTS. VOICED UNDERSTANDING WILL MAKE AN APPT IF NEEDED

## 2023-02-24 PROCEDURE — 93294 REM INTERROG EVL PM/LDLS PM: CPT | Performed by: INTERNAL MEDICINE

## 2023-02-24 PROCEDURE — 93296 REM INTERROG EVL PM/IDS: CPT | Performed by: INTERNAL MEDICINE

## 2023-03-01 ENCOUNTER — TELEPHONE (OUTPATIENT)
Dept: OBSTETRICS AND GYNECOLOGY | Facility: CLINIC | Age: 88
End: 2023-03-01
Payer: MEDICARE

## 2023-03-01 NOTE — TELEPHONE ENCOUNTER
Pt called about burning while urinating.     I recommended going to immediate care since she is a new gyn pt and her appt isn't until may. I also recommended a different dr to get in sooner and they wouldn't because it wasn't a female.

## 2023-03-02 RX ORDER — BROMPHENIRAMIN/PSEUDOEPHEDRINE 1-15MG/5ML
1 LIQUID (ML) ORAL 2 TIMES DAILY
Qty: 21 G | Refills: 0 | Status: SHIPPED | OUTPATIENT
Start: 2023-03-02 | End: 2023-03-07

## 2023-03-02 RX ORDER — FLUCONAZOLE 150 MG/1
150 TABLET ORAL ONCE
Qty: 1 TABLET | Refills: 0 | Status: SHIPPED | OUTPATIENT
Start: 2023-03-02 | End: 2023-03-02

## 2023-03-07 ENCOUNTER — OFFICE VISIT (OUTPATIENT)
Dept: FAMILY MEDICINE CLINIC | Facility: CLINIC | Age: 88
End: 2023-03-07
Payer: MEDICARE

## 2023-03-07 VITALS
WEIGHT: 192.3 LBS | BODY MASS INDEX: 32.83 KG/M2 | TEMPERATURE: 97.3 F | OXYGEN SATURATION: 99 % | HEIGHT: 64 IN | HEART RATE: 72 BPM | SYSTOLIC BLOOD PRESSURE: 114 MMHG | DIASTOLIC BLOOD PRESSURE: 65 MMHG

## 2023-03-07 DIAGNOSIS — R10.2 VULVAR PAIN: ICD-10-CM

## 2023-03-07 DIAGNOSIS — R30.0 BURNING WITH URINATION: Primary | ICD-10-CM

## 2023-03-07 LAB
BILIRUB BLD-MCNC: NEGATIVE MG/DL
CLARITY, POC: ABNORMAL
COLOR UR: ABNORMAL
EXPIRATION DATE: ABNORMAL
GLUCOSE UR STRIP-MCNC: NEGATIVE MG/DL
KETONES UR QL: NEGATIVE
LEUKOCYTE EST, POC: NEGATIVE
Lab: ABNORMAL
NITRITE UR-MCNC: NEGATIVE MG/ML
PH UR: 6 [PH] (ref 5–8)
PROT UR STRIP-MCNC: NEGATIVE MG/DL
RBC # UR STRIP: ABNORMAL /UL
SP GR UR: 1.02 (ref 1–1.03)
UROBILINOGEN UR QL: ABNORMAL

## 2023-03-07 PROCEDURE — 81003 URINALYSIS AUTO W/O SCOPE: CPT | Performed by: FAMILY MEDICINE

## 2023-03-07 PROCEDURE — 99214 OFFICE O/P EST MOD 30 MIN: CPT | Performed by: FAMILY MEDICINE

## 2023-03-07 RX ORDER — FUROSEMIDE 20 MG/1
20 TABLET ORAL DAILY PRN
COMMUNITY
Start: 2023-01-28

## 2023-03-07 RX ORDER — FLUCONAZOLE 150 MG/1
TABLET ORAL
COMMUNITY
Start: 2023-03-02 | End: 2023-03-07

## 2023-03-07 RX ORDER — PHENAZOPYRIDINE HYDROCHLORIDE 200 MG/1
200 TABLET, FILM COATED ORAL 3 TIMES DAILY PRN
Qty: 30 TABLET | Refills: 0 | Status: SHIPPED | OUTPATIENT
Start: 2023-03-07

## 2023-03-07 NOTE — PROGRESS NOTES
"Chief Complaint  VAGINAL BURNING     Subjective        Brittany Villeda presents to [facility]  History of Present Illness     87-year-old female, previously bedbound, now ambulating better.  She complains of 6 weeks of least of dysuria.  Burning with urination.  Also states it feels like her vulva is swollen and uncomfortable.  She states occasionally the pain will shoot to her rectum.  She was seen by a visiting nurse.  Urinalysis was suggestive of possible UTI.  She was treated with antibiotics with no resolution of symptoms.  She then had another round of antibiotics prescribed by different provider.  Again no resolution of symptoms.  Last week I empirically treated her for vulvitis with fluconazole times 1+ clotrimazole cream.  She states no help.  She has a history of a hysterectomy.  Remote.  Ovaries are still present.  No blood in the urine.  She had blood in her stool once recently.  No vaginal bleeding.  No vaginal discharge.  No itching or burning.  She states it hurts when she sits for period of time.  In November 2022 a CT scan of the abdomen pelvis was essentially unremarkable.    Objective   Vital Signs:  /65   Pulse 72   Temp 97.3 °F (36.3 °C) (Temporal)   Ht 162.6 cm (64\")   Wt 87.2 kg (192 lb 4.8 oz)   SpO2 99%   BMI 33.01 kg/m²   Estimated body mass index is 33.01 kg/m² as calculated from the following:    Height as of this encounter: 162.6 cm (64\").    Weight as of this encounter: 87.2 kg (192 lb 4.8 oz).             Physical Exam  Constitutional:       General: She is not in acute distress.     Appearance: Normal appearance. She is not ill-appearing.   Cardiovascular:      Rate and Rhythm: Normal rate.   Pulmonary:      Effort: Pulmonary effort is normal.   Abdominal:      General: There is no distension.      Palpations: Abdomen is soft. There is no mass.      Tenderness: There is no abdominal tenderness. There is no guarding or rebound.      " Hernia: No hernia is present.   Genitourinary:     Comments: The vulva is unremarkable.  The labia minora are perhaps slightly indurated.  But no erythema.  There is no vaginal discharge.  No vulvar or groin erythema or discharge.  The perianal area appears unremarkable.  With bimanual examination there is no palpable mass.  Nontender.  No foreign body.  Point-of-care ultrasound, suprapubic, reveals a partially empty bladder with out high residual.  No obvious mass.  Neurological:      Mental Status: She is alert.        Result Review :          Urinalysis today is unremarkable.         Assessment and Plan   Diagnoses and all orders for this visit:    1. Burning with urination (Primary)      Dysuria.  At least 6 weeks.  Subacute.  No obvious cause.  She has may be some vulvar edema but no inflammation.  Urinalysis is unremarkable.  There is no palpable mass.  No constitutional symptoms.  At this time most likely diagnosis is overactive bladder or interstitial cystitis or both.  Continue Pyridium as needed.  I want her to follow-up with her gynecologist for a recheck.  If symptoms persist, I would recommend urological consultation.       I spent 35 minutes caring for Brittany on this date of service. This time includes time spent by me in the following activities:preparing for the visit, reviewing tests, obtaining and/or reviewing a separately obtained history, performing a medically appropriate examination and/or evaluation , counseling and educating the patient/family/caregiver, ordering medications, tests, or procedures, documenting information in the medical record and care coordination  Follow Up   No follow-ups on file.  Patient was given instructions and counseling regarding her condition or for health maintenance advice. Please see specific information pulled into the AVS if appropriate.

## 2023-03-10 NOTE — PROGRESS NOTES
"EVALUATION AND MANAGEMENT ENCOUNTER    S:  Chief Complaint   Patient presents with   • VUVLAR PAIN       HPI:  Brittany Villeda is a 87 y.o.  with No LMP recorded (lmp unknown). Patient has had a hysterectomy. here for burning around introitus when she urinates, burning around anus. Recent ua neg for UTI.     Review of Systems   Constitutional: Negative.    HENT: Positive for hearing loss.    Eyes: Negative.    Respiratory: Negative.    Cardiovascular: Negative.    Gastrointestinal: Negative.    Endocrine: Negative.    Genitourinary:        Genital itching   Musculoskeletal: Negative.    Skin: Negative.    Allergic/Immunologic: Negative.    Neurological: Negative.    Hematological: Negative.    Psychiatric/Behavioral: Negative.        Patient reports that she is not currently experiencing any symptoms of urinary incontinence.      noTESTED FOR CHLAMYDIA?    .CESSATIONOPT    Vital Signs: /68   Ht 165.1 cm (65\")   Wt 83.5 kg (184 lb)   LMP  (LMP Unknown)   BMI 30.62 kg/m²      Brief Urine Lab Results  (Last result in the past 365 days)      Color   Clarity   Blood   Leuk Est   Nitrite   Protein   CREAT   Urine HCG        23 1210 Dark Yellow   Slightly Cloudy   Trace   Negative   Negative   Negative                 Physical Exam  Vitals and nursing note reviewed.   Constitutional:       Appearance: She is well-developed.   HENT:      Head: Normocephalic and atraumatic.   Cardiovascular:      Rate and Rhythm: Normal rate.   Pulmonary:      Effort: Pulmonary effort is normal.   Abdominal:      General: There is no distension.      Palpations: Abdomen is soft. There is no mass.      Tenderness: There is no abdominal tenderness. There is no guarding.   Genitourinary:     Vagina: No vaginal discharge.          Comments: Vitiligo-appearing area as noted, no pigment.  Skin not thickened.  Musculoskeletal:         General: No tenderness or deformity. Normal range of motion.      Cervical back: " Normal range of motion.   Skin:     General: Skin is warm and dry.      Coloration: Skin is not pale.      Findings: No erythema or rash.   Neurological:      Mental Status: She is alert and oriented to person, place, and time.   Psychiatric:         Behavior: Behavior normal.         Thought Content: Thought content normal.         Judgment: Judgment normal.         I saw the patient with a face mask, gloves and eye protection  The patient herself was masked.  Social distancing was observed as appropriate. All COVID precautions observed.     IMPRESSION:      Lichen sclerosis, vitiligo-appearing    Diagnoses and all orders for this visit:    1. Senile atrophic vaginitis (Primary)    2. Lichen sclerosus of female genitalia    Other orders  -     Discontinue: estradiol (ESTRACE VAGINAL) 0.1 MG/GM vaginal cream; Insert 2 g into the vagina Daily.  Dispense: 42.5 g; Refill: 3  -     estradiol (ESTRACE VAGINAL) 0.1 MG/GM vaginal cream; Insert 2 g into the vagina Daily.  Dispense: 42.5 g; Refill: 3          PLAN:      Topical estrogen cream for the atrophic vaginal atrophy, then consider steroids, since skin is not hypertrophic.    Pt instructed to call for results of any testing done today if she does not hear from us, and that failure to do so could result in inadequate treatment . Pt verbalized her understanding.     Return in about 2 months (around 5/13/2023) for Recheck..  Instructions and precautions given.     Time Spent: I spent 30+ minutes caring for Brittany on this date of service. This time includes time spent by me in the following activities: preparing for the visit, reviewing tests, obtaining and/or reviewing a separately obtained history, performing a medically appropriate examination and/or evaluation, counseling and educating the patient/family/caregiver, ordering medications, tests, or procedures, referring and communicating with other health care professionals, documenting information in the medical  record, independently interpreting results and communicating that information with the patient/family/caregiver and care coordination.      Gordy Taylor MD  18:07 EDT   03/13/2023

## 2023-03-13 ENCOUNTER — OFFICE VISIT (OUTPATIENT)
Dept: OBSTETRICS AND GYNECOLOGY | Facility: CLINIC | Age: 88
End: 2023-03-13
Payer: MEDICARE

## 2023-03-13 ENCOUNTER — CLINICAL SUPPORT NO REQUIREMENTS (OUTPATIENT)
Dept: CARDIOLOGY | Facility: CLINIC | Age: 88
End: 2023-03-13
Payer: MEDICARE

## 2023-03-13 ENCOUNTER — OFFICE VISIT (OUTPATIENT)
Dept: CARDIOLOGY | Facility: CLINIC | Age: 88
End: 2023-03-13
Payer: MEDICARE

## 2023-03-13 ENCOUNTER — OFFICE VISIT (OUTPATIENT)
Dept: GASTROENTEROLOGY | Facility: CLINIC | Age: 88
End: 2023-03-13
Payer: MEDICARE

## 2023-03-13 VITALS
HEART RATE: 66 BPM | DIASTOLIC BLOOD PRESSURE: 72 MMHG | BODY MASS INDEX: 32.37 KG/M2 | HEIGHT: 64 IN | SYSTOLIC BLOOD PRESSURE: 116 MMHG | WEIGHT: 189.6 LBS | TEMPERATURE: 96 F | OXYGEN SATURATION: 96 %

## 2023-03-13 VITALS
DIASTOLIC BLOOD PRESSURE: 68 MMHG | HEIGHT: 65 IN | BODY MASS INDEX: 30.66 KG/M2 | WEIGHT: 184 LBS | SYSTOLIC BLOOD PRESSURE: 128 MMHG

## 2023-03-13 VITALS
HEIGHT: 64 IN | WEIGHT: 190.2 LBS | BODY MASS INDEX: 32.47 KG/M2 | DIASTOLIC BLOOD PRESSURE: 72 MMHG | OXYGEN SATURATION: 97 % | HEART RATE: 70 BPM | SYSTOLIC BLOOD PRESSURE: 132 MMHG

## 2023-03-13 DIAGNOSIS — I10 ESSENTIAL HYPERTENSION: ICD-10-CM

## 2023-03-13 DIAGNOSIS — I44.2 AV BLOCK, COMPLETE: ICD-10-CM

## 2023-03-13 DIAGNOSIS — K51.30 ULCERATIVE RECTOSIGMOIDITIS WITHOUT COMPLICATION: Primary | ICD-10-CM

## 2023-03-13 DIAGNOSIS — K21.9 GASTROESOPHAGEAL REFLUX DISEASE, UNSPECIFIED WHETHER ESOPHAGITIS PRESENT: ICD-10-CM

## 2023-03-13 DIAGNOSIS — I48.11 LONGSTANDING PERSISTENT ATRIAL FIBRILLATION: ICD-10-CM

## 2023-03-13 DIAGNOSIS — R60.0 PEDAL EDEMA: ICD-10-CM

## 2023-03-13 DIAGNOSIS — N90.4 LICHEN SCLEROSUS OF FEMALE GENITALIA: ICD-10-CM

## 2023-03-13 DIAGNOSIS — N95.2 SENILE ATROPHIC VAGINITIS: Primary | ICD-10-CM

## 2023-03-13 DIAGNOSIS — I51.89 DIASTOLIC DYSFUNCTION: Primary | ICD-10-CM

## 2023-03-13 DIAGNOSIS — I44.2 AV BLOCK, COMPLETE: Primary | ICD-10-CM

## 2023-03-13 DIAGNOSIS — Z95.0 PRESENCE OF CARDIAC PACEMAKER: ICD-10-CM

## 2023-03-13 PROCEDURE — 1160F RVW MEDS BY RX/DR IN RCRD: CPT | Performed by: OBSTETRICS & GYNECOLOGY

## 2023-03-13 PROCEDURE — 1159F MED LIST DOCD IN RCRD: CPT | Performed by: NURSE PRACTITIONER

## 2023-03-13 PROCEDURE — 1159F MED LIST DOCD IN RCRD: CPT | Performed by: OBSTETRICS & GYNECOLOGY

## 2023-03-13 PROCEDURE — 99203 OFFICE O/P NEW LOW 30 MIN: CPT | Performed by: OBSTETRICS & GYNECOLOGY

## 2023-03-13 PROCEDURE — 1160F RVW MEDS BY RX/DR IN RCRD: CPT | Performed by: NURSE PRACTITIONER

## 2023-03-13 PROCEDURE — 1160F RVW MEDS BY RX/DR IN RCRD: CPT | Performed by: INTERNAL MEDICINE

## 2023-03-13 PROCEDURE — 99214 OFFICE O/P EST MOD 30 MIN: CPT | Performed by: NURSE PRACTITIONER

## 2023-03-13 PROCEDURE — 1159F MED LIST DOCD IN RCRD: CPT | Performed by: INTERNAL MEDICINE

## 2023-03-13 PROCEDURE — 99214 OFFICE O/P EST MOD 30 MIN: CPT | Performed by: INTERNAL MEDICINE

## 2023-03-13 RX ORDER — ESTRADIOL 0.1 MG/G
2 CREAM VAGINAL DAILY
Qty: 42.5 G | Refills: 3 | Status: SHIPPED | OUTPATIENT
Start: 2023-03-13 | End: 2023-03-13 | Stop reason: SDUPTHER

## 2023-03-13 RX ORDER — ESTRADIOL 0.1 MG/G
2 CREAM VAGINAL DAILY
Qty: 42.5 G | Refills: 3 | Status: SHIPPED | OUTPATIENT
Start: 2023-03-13

## 2023-03-13 NOTE — PROGRESS NOTES
Subjective   Chief Complaint   Patient presents with   • Rectal Pain     Burning sensation       Brittany Villeda is a  87 y.o. female here for a follow up visit for rectal pain.  Patient has a history of ulcerative rectosigmoiditis.    Upon further questioning, the pain is more in the perineum involving her vulva.  She reports that it burns when she urinates.  She has tried recent antifungal cream with no improvement.  She has no perianal discomfort.  She is seen no blood in her stool.  Bowel movements have been normal.  No rectal pain or discomfort with passing a bowel movement.  She is going to follow-up with gynecology regarding her vulvar discomfort.    She had issues with rectal bleeding in the fall 2022.  She underwent a flexible sigmoidoscopy at that time which did show some mild ulcerative proctosigmoiditis and she was treated with prednisone.  She is managed chronically with Apriso 1.5 g daily.  HPI  Past Medical History:   Diagnosis Date   • Acute UTI (urinary tract infection) 04/08/2021   • Anemia    • Arrhythmia    • Arthritis    • Asthma    • Bradycardia    • Chest pain    • Choledocholithiasis 05/09/2021   • Colitis    • COPD (chronic obstructive pulmonary disease) ([facility])    • Diastolic dysfunction    • Essential hypertension 05/12/2016   • GERD (gastroesophageal reflux disease)    • Heart block    • Hernia    • Hypertension    • Hypertensive heart disease    • Hyperthyroidism    • Kidney stone    • Leukopenia    • Low back pain    • MGUS (monoclonal gammopathy of unknown significance)    • Nephrolithiasis    • Obesity    • Paroxysmal atrial fibrillation ([facility])    • Peptic ulceration    • Persistent atrial fibrillation ([facility])    • PSVT (paroxysmal supraventricular tachycardia) ([facility])    • Pulmonary hypertension ([facility])    • Rectal bleed    • Systemic hypertension    • Trifascicular block    • 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[facility])    • Ventricular tachycardia     nonsustained   • Vertigo       Past Surgical History:   Procedure Laterality Date   • BACK SURGERY      lumbar fusion   • DUSTY HOLE Left 08/08/2021    Procedure: Left-sided dusty holes for evacuation of subdural hematoma;  Surgeon: Gustavo Callaway MD;  Location: [facility] MAIN OR;  Service: Neurosurgery;  Laterality: Left;   • CARDIAC ELECTROPHYSIOLOGY PROCEDURE N/A 11/07/2018    Procedure: Pacemaker DC new   [place];  Surgeon: Jesus Granda MD;  Location: [facility] CATH INVASIVE LOCATION;  Service: Cardiology   • CHOLECYSTECTOMY     • COLONOSCOPY  06/16/2014    colitis, cryptitis,  tics, NBIH, TA w/low grade dysplasia   • COLONOSCOPY N/A 10/28/2019    Procedure: COLONOSCOPY WITH COLD AND HOT POLYPECTOMIES;  Surgeon: Micaela English MD;  Location: [facility] ENDOSCOPY;  Service: Gastroenterology   • ENDOSCOPY N/A 05/13/2021    Procedure: ESOPHAGOGASTRODUODENOSCOPY with BX;  Surgeon: Stefan Mcfadden MD;  Location: [facility] ENDOSCOPY;  Service: Gastroenterology;  Laterality: N/A;  EPIGASTRIC PAIN  --DUODENAL ULCER, HEMORRHAGIC GASTRITIS, HIATAL HERNIA    • ENDOSCOPY N/A 10/11/2022    Procedure: ESOPHAGOGASTRODUODENOSCOPY;  Surgeon: Micaela English MD;  Location: [facility] ENDOSCOPY;  Service: Gastroenterology;  Laterality: N/A;  PRE- MELENA  POST- ESOPHAGITIS   • HEMORRHOIDECTOMY     • HERNIA REPAIR      umbilical   • HYSTERECTOMY     • JOINT REPLACEMENT     • KNEE ARTHROPLASTY     • MYOMECTOMY     • PACEMAKER IMPLANTATION     • SHOULDER SURGERY     • SIGMOIDOSCOPY N/A 11/02/2022    Procedure: SIGMOIDOSCOPY FLEXIBLE WITH COLD BIOPSIES AND ASPIRATE;  Surgeon: Micaela English MD;  Location: [facility] ENDOSCOPY;  Service: Gastroenterology;  Laterality: N/A;  PRE- RECTAL BLEEDING  POST- HEMORRHOIDS, DIVERTICULOSIS, COLITIS   • SINUS SURGERY     • TOE NAIL AMPUTATION  03/04/2019   • TONSILLECTOMY         Current Outpatient Medications:   •  acetaminophen (TYLENOL) 500 MG tablet, Take 1 tablet by mouth Every 6 (Six) Hours As Needed for Mild Pain  ., Disp: , Rfl:   •  albuterol sulfate  (90 Base) MCG/ACT inhaler, Inhale 2 puffs Every 4 (Four) Hours As Needed for Wheezing (or cough)., Disp: 18 g, Rfl: 0  •  ELDERBERRY PO, Take 2 tablets by mouth Daily., Disp: , Rfl:   •  furosemide (LASIX) 20 MG tablet, Take 1 tablet by mouth Daily As Needed., Disp: , Rfl:   •  Magnesium Oxide 400 (240 Mg) MG tablet, TAKE 1 TABLET EVERY DAY, Disp: 90 tablet, Rfl: 1  •  mesalamine (APRISO) 0.375 g 24 hr capsule, TAKE 4 CAPSULES EVERY DAY, Disp: 360 capsule, Rfl: 3  •  pantoprazole (PROTONIX) 40 MG EC tablet, TAKE 1 TABLET TWICE DAILY, Disp: 180 tablet, Rfl: 1  •  phenazopyridine (Pyridium) 200 MG tablet, Take 1 tablet by mouth 3 (Three) Times a Day As Needed for Bladder Spasms., Disp: 30 tablet, Rfl: 0  •  potassium chloride (K-DUR,KLOR-CON) 10 MEQ CR tablet, TAKE 1 TABLET EVERY DAY, Disp: 90 tablet, Rfl: 1  •  vitamin B-12 (CYANOCOBALAMIN) 1000 MCG tablet, TAKE 1 TABLET EVERY DAY, Disp: 90 tablet, Rfl: 1  PRN Meds:.  Allergies   Allergen Reactions   • Codeine Hallucinations     Tolerates hydromorphone   • Amitriptyline Rash   • Amoxicillin-Pot Clavulanate Rash   • Aspirin Unknown - Low Severity     Patient doesn't know why   • Bactrim [Sulfamethoxazole-Trimethoprim] Rash   • Carisoprodol-Aspirin-Codeine Palpitations   • Iodinated Contrast Media Rash   • Latex Rash   • Naproxen Rash   • Nsaids Unknown - Low Severity     unknown   • Soma Compound With Codeine [Carisoprodol-Aspirin-Codeine] Rash   • Sulfa Antibiotics Rash   • Tramadol Palpitations     heart races      Social History     Socioeconomic History   • Marital status:    • Number of children: 10   • Years of education: High School   Tobacco Use   • Smoking status: Former     Packs/day: 1.50     Years: 10.00     Pack years: 15.00     Types: Cigarettes     Start date:      Quit date:      Years since quittin.2   • Smokeless tobacco: Never   • Tobacco comments:     QUIT SMOKING    Vaping Use    • Vaping Use: Never used   Substance and Sexual Activity   • Alcohol use: No   • Drug use: Never   • Sexual activity: Defer     Family History   Problem Relation Age of Onset   • Diabetes Mother    • Breast cancer Sister    • Kidney cancer Sister    • Heart disease Sister    • Prostate cancer Brother    • Prostate cancer Brother    • Prostate cancer Brother    • Malig Hyperthermia Neg Hx      Review of Systems   Constitutional: Negative for appetite change and unexpected weight change.   Gastrointestinal: Negative for abdominal pain, anal bleeding, blood in stool and rectal pain.     Vitals:    03/13/23 1055   BP: 116/72   Pulse: 66   Temp: 96 °F (35.6 °C)   SpO2: 96%         03/13/23  1055   Weight: 86 kg (189 lb 9.6 oz)       Objective   Physical Exam  Constitutional:       Appearance: Normal appearance. She is well-developed.   HENT:      Head: Normocephalic and atraumatic.   Eyes:      General: No scleral icterus.     Conjunctiva/sclera: Conjunctivae normal.   Pulmonary:      Effort: Pulmonary effort is normal.   Abdominal:      General: There is no distension.      Palpations: Abdomen is soft.   Musculoskeletal:      Cervical back: Normal range of motion and neck supple.   Skin:     General: Skin is warm and dry.   Neurological:      Mental Status: She is alert.   Psychiatric:         Mood and Affect: Mood normal.         Behavior: Behavior normal.       No radiology results for the last 7 days    Assessment & Plan   Diagnoses and all orders for this visit:    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HCC)  -     CBC & Differential  -     Comprehensive Metabolic Panel  -     Vitamin D 25 Hydroxy    Gastroesophageal reflux disease, unspecified whether esophagitis present      Plan:  · UC stable-no diarrhea or rectal bleeding.  Continue Apriso 1.5 g daily  · Labs today-routine  · Has scheduled follow-up with gynecology for vulvar discomfort  · GERD stable on pantoprazole once daily

## 2023-03-13 NOTE — PROGRESS NOTES
"    CARDIOLOGY        Patient Name: Brittany Villeda  :1935  Age: 87 y.o.  Primary Cardiologist: Mary Grace Culp MD  Encounter Provider:  SHAHID Thibodeaux    Date of Service: 23      CHIEF COMPLAINT / REASON FOR OFFICE VISIT     Atrial Fibrillation (6 month f/u)      HISTORY OF PRESENT ILLNESS       HPI    Brittany Villeda is a 87 y.o. female who presents today for semiannual follow up.     Pt has a  history significant for PAF, HTN, SSS with PPM, dilated aortic root, HUGO, immobility, pulmonary HTN.    Patient presents today with her daughter.  Reports that she has done well since last evaluation.  Patient is tolerating all medications without adverse effects.  Largest complaint today is lower extremity edema.  Patient states that she has chronic lower extremity edema and that she used to take furosemide daily but then her symptoms improved so her PCP transitioned her to as needed only dosing.  She did take a dose earlier this week and reports that edema was improved but then she was urinating frequently.  Denies any chest discomfort, dyspnea at rest or with exertion, palpitations, lightheadedness, fatigue.      The following portions of the patient's history were reviewed and updated as appropriate: allergies, current medications, past family history, past medical history, past social history, past surgical history and problem list.      VITAL SIGNS     Visit Vitals  /72 (BP Location: Left arm, Patient Position: Sitting, Cuff Size: Adult)   Pulse 70   Ht 162.6 cm (64\")   Wt 86.3 kg (190 lb 3.2 oz)   LMP  (LMP Unknown)   SpO2 97%   BMI 32.65 kg/m²         Wt Readings from Last 3 Encounters:   23 86.3 kg (190 lb 3.2 oz)   23 87.2 kg (192 lb 4.8 oz)   23 88.2 kg (194 lb 8 oz)     Body mass index is 32.65 kg/m².      REVIEW OF SYSTEMS   Review of Systems   Constitutional: Negative for chills, fever, weight gain and weight loss.   Cardiovascular: Positive for leg swelling. "   Respiratory: Negative for cough, snoring and wheezing.    Hematologic/Lymphatic: Negative for bleeding problem. Does not bruise/bleed easily.   Skin: Negative for color change.   Musculoskeletal: Negative for falls, joint pain and myalgias.   Gastrointestinal: Negative for melena.   Genitourinary: Negative for hematuria.   Neurological: Negative for excessive daytime sleepiness.   Psychiatric/Behavioral: Negative for depression. The patient is not nervous/anxious.            PHYSICAL EXAMINATION     Vitals and nursing note reviewed.   Constitutional:       Appearance: Normal appearance. Well-developed.   Eyes:      Conjunctiva/sclera: Conjunctivae normal.   Neck:      Vascular: No carotid bruit.   Pulmonary:      Breath sounds: Normal breath sounds.   Cardiovascular:      Normal rate. Regular rhythm. Normal S1 with normal intensity. Normal S2 with normal intensity.      Murmurs: There is no murmur.      No gallop. No click. No rub.   Edema:     Peripheral edema present.     Pretibial: bilateral 2+ edema of the pretibial area.     Ankle: bilateral 2+ edema of the ankle.     Feet: bilateral 2+ edema of the feet.  Musculoskeletal: Normal range of motion. Skin:     General: Skin is warm and dry.   Neurological:      Mental Status: Alert and oriented to person, place, and time.      GCS: GCS eye subscore is 4. GCS verbal subscore is 5. GCS motor subscore is 6.   Psychiatric:         Speech: Speech normal.         Behavior: Behavior normal.         Thought Content: Thought content normal.         Judgment: Judgment normal.           REVIEWED DATA     Procedures      Cardiac Procedures:  1. Echocardiogram 4/9/2021.  RA is severely dilated.  Left atrium is severely dilated.  LVEF 60%.  Diastolic function indeterminate.  Mildly reduced RV systolic function.  Saline test results are negative.      BUN   Date Value Ref Range Status   11/02/2022 11 8 - 23 mg/dL Final     Creatinine   Date Value Ref Range Status   11/02/2022  0.76 0.57 - 1.00 mg/dL Final   08/26/2020 1.10 0.60 - 1.30 mg/dL Final     Comment:     Serial Number: [id number]:  [id number]     Potassium   Date Value Ref Range Status   11/02/2022 4.8 3.5 - 5.2 mmol/L Final     ALT (SGPT)   Date Value Ref Range Status   11/01/2022 12 1 - 33 U/L Final     AST (SGOT)   Date Value Ref Range Status   11/01/2022 16 1 - 32 U/L Final           ASSESSMENT & PLAN     Diagnoses and all orders for this visit:    1. Diastolic dysfunction (Primary)  · Denies dyspnea, dyspnea with exertion, orthopnea    2. Essential hypertension  · BP currently controlled  · Patient educated on importance of maintaining a low-sodium diet    3. AV block, complete (HCC)  4. Presence of cardiac pacemaker  · In need of device interrogation, this will be done after my appointment today    5. Longstanding persistent atrial fibrillation (HCC)  · Heart rate currently controlled  · No anticoagulation secondary to history of hemorrhagic stroke    6. Pedal edema  · Noted lower extremity edema, patient did take a dose of furosemide earlier this week and it improved her symptoms.  · Had discussion about balance between taking furosemide daily versus as needed.  Patient states that she does have increased urination on days that she takes it.  She is aware that she can take furosemide daily on an as-needed basis to balance her lower extremity edema.          Return in about 6 months (around 9/13/2023) for Dr. Culp, DO NOT SCHEDULE WITH AN NP, she has not seen [facility] since 2021..    Future Appointments       Provider Department Center    3/13/2023 10:45 AM Micaela English MD [facility] GASTROENTEROLOGY [facility]    3/13/2023 1:45 PM Gordy Taylor MD [facility] OBGYN [place] [facility]    3/27/2023 9:00 AM TREV BONILLA [place] DEVICE CHECK [facility] CARDIOLOGY [facility]    4/18/2023 9:00 AM Mega Esparza MD [facility] PRIMARY CARE [facility]    5/26/2023 11:00 AM  Shasha Wilkerson APRN [facility] OBN [facility]    7/10/2023 10:00 AM Brooke Gould MD [facility]                MEDICATIONS         Discharge Medications          Accurate as of March 13, 2023  9:48 AM. If you have any questions, ask your nurse or doctor.            Changes to Medications      Instructions Start Date   mesalamine 0.375 g 24 hr capsule  Commonly known as: APRISO  What changed: Another medication with the same name was removed. Continue taking this medication, and follow the directions you see here.  Changed by: SHAHID Thibodeaux   TAKE 4 CAPSULES EVERY DAY         Continue These Medications      Instructions Start Date   acetaminophen 500 MG tablet  Commonly known as: TYLENOL   500 mg, Oral, Every 6 Hours PRN      albuterol sulfate  (90 Base) MCG/ACT inhaler  Commonly known as: PROVENTIL HFA;VENTOLIN HFA;PROAIR HFA   2 puffs, Inhalation, Every 4 Hours PRN      ELDERBERRY PO   2 tablets, Oral, Daily      furosemide 20 MG tablet  Commonly known as: LASIX   20 mg, Oral, Daily PRN      Magnesium Oxide 400 (240 Mg) MG tablet   TAKE 1 TABLET EVERY DAY      pantoprazole 40 MG EC tablet  Commonly known as: PROTONIX   TAKE 1 TABLET TWICE DAILY      phenazopyridine 200 MG tablet  Commonly known as: Pyridium   200 mg, Oral, 3 Times Daily PRN      potassium chloride 10 MEQ CR tablet  Commonly known as: K-DUR,KLOR-CON   TAKE 1 TABLET EVERY DAY      vitamin B-12 1000 MCG tablet  Commonly known as: CYANOCOBALAMIN   TAKE 1 TABLET EVERY DAY                 **Dragon Disclaimer:   Much of this encounter note is an electronic transcription/translation of spoken language to printed text. The electronic translation of spoken language may permit erroneous, or at times, nonsensical words or phrases to be inadvertently transcribed. Although I have reviewed the note for such errors, some may still exist.

## 2023-03-14 LAB
25(OH)D3+25(OH)D2 SERPL-MCNC: 33.1 NG/ML (ref 30–100)
ALBUMIN SERPL-MCNC: 4.3 G/DL (ref 3.5–5.2)
ALBUMIN/GLOB SERPL: 1.3 G/DL
ALP SERPL-CCNC: 71 U/L (ref 39–117)
ALT SERPL-CCNC: 8 U/L (ref 1–33)
AST SERPL-CCNC: 9 U/L (ref 1–32)
BASOPHILS # BLD AUTO: 0.02 10*3/MM3 (ref 0–0.2)
BASOPHILS NFR BLD AUTO: 0.5 % (ref 0–1.5)
BILIRUB SERPL-MCNC: 1.4 MG/DL (ref 0–1.2)
BUN SERPL-MCNC: 13 MG/DL (ref 8–23)
BUN/CREAT SERPL: 13.3 (ref 7–25)
CALCIUM SERPL-MCNC: 11.1 MG/DL (ref 8.6–10.5)
CHLORIDE SERPL-SCNC: 107 MMOL/L (ref 98–107)
CO2 SERPL-SCNC: 27.4 MMOL/L (ref 22–29)
CREAT SERPL-MCNC: 0.98 MG/DL (ref 0.57–1)
EGFRCR SERPLBLD CKD-EPI 2021: 56 ML/MIN/1.73
EOSINOPHIL # BLD AUTO: 0.04 10*3/MM3 (ref 0–0.4)
EOSINOPHIL NFR BLD AUTO: 1.1 % (ref 0.3–6.2)
ERYTHROCYTE [DISTWIDTH] IN BLOOD BY AUTOMATED COUNT: 12.3 % (ref 12.3–15.4)
GLOBULIN SER CALC-MCNC: 3.4 GM/DL
GLUCOSE SERPL-MCNC: 99 MG/DL (ref 65–99)
HCT VFR BLD AUTO: 36.5 % (ref 34–46.6)
HGB BLD-MCNC: 12.2 G/DL (ref 12–15.9)
IMM GRANULOCYTES # BLD AUTO: 0.01 10*3/MM3 (ref 0–0.05)
IMM GRANULOCYTES NFR BLD AUTO: 0.3 % (ref 0–0.5)
LYMPHOCYTES # BLD AUTO: 1.92 10*3/MM3 (ref 0.7–3.1)
LYMPHOCYTES NFR BLD AUTO: 52.5 % (ref 19.6–45.3)
MCH RBC QN AUTO: 32.2 PG (ref 26.6–33)
MCHC RBC AUTO-ENTMCNC: 33.4 G/DL (ref 31.5–35.7)
MCV RBC AUTO: 96.3 FL (ref 79–97)
MONOCYTES # BLD AUTO: 0.24 10*3/MM3 (ref 0.1–0.9)
MONOCYTES NFR BLD AUTO: 6.6 % (ref 5–12)
NEUTROPHILS # BLD AUTO: 1.43 10*3/MM3 (ref 1.7–7)
NEUTROPHILS NFR BLD AUTO: 39 % (ref 42.7–76)
NRBC BLD AUTO-RTO: 0 /100 WBC (ref 0–0.2)
PLATELET # BLD AUTO: 126 10*3/MM3 (ref 140–450)
POTASSIUM SERPL-SCNC: 4.9 MMOL/L (ref 3.5–5.2)
PROT SERPL-MCNC: 7.7 G/DL (ref 6–8.5)
RBC # BLD AUTO: 3.79 10*6/MM3 (ref 3.77–5.28)
SODIUM SERPL-SCNC: 141 MMOL/L (ref 136–145)
WBC # BLD AUTO: 3.66 10*3/MM3 (ref 3.4–10.8)

## 2023-03-15 ENCOUNTER — TELEPHONE (OUTPATIENT)
Dept: GASTROENTEROLOGY | Facility: CLINIC | Age: 88
End: 2023-03-15
Payer: MEDICARE

## 2023-03-15 NOTE — TELEPHONE ENCOUNTER
----- Message from Micaela English MD sent at 3/14/2023  5:08 PM EDT -----  Labs are stable from prior - calcium is slightly elevated - stop calcium supplements if applicable - f/u with PCP routinely for repeat labs in a few months

## 2023-03-15 NOTE — TELEPHONE ENCOUNTER
Caller: Amina Diana    Relationship to patient: Emergency Contact    Best call back number: [phone]    Patient is needing: PATIENT MISSED NURSE CALL AND IS REQUESTING CALL BACK.

## 2023-03-20 ENCOUNTER — TELEPHONE (OUTPATIENT)
Dept: ORTHOPEDIC SURGERY | Facility: CLINIC | Age: 88
End: 2023-03-20
Payer: MEDICARE

## 2023-03-20 NOTE — TELEPHONE ENCOUNTER
Caller: CONCEPCION SOSA    Relationship to patient: DAUGHTER    Best call back number: [phone]    Chief complaint: BILATERAL SHOULDER PAIN    Type of visit: INJECTION/ FOLLOW UP    Requested date: FIRST AVAILABLE APPT AT EITHER OFFICE    Additional notes: PATIENT'S DAUGHTER, CONCEPCION WAS CALLING TO REQUEST AN APPT WITH DR. ARELLANO FOR PATIENT TO GET AN INJECTION IN BOTH SHOULDER'S IF POSSIBLE. I ATTEMPTED TO WARM TRANSFER CALL TO THE OFFICE DUE TO PATIENT SEEING DR. MOSCOSO LAST BUT I RECEIVED NO ANSWER. CONCEPCION IS REQUESTING AN APPT FOR THE FIRST AVAILABLE APPT AT EITHER OFFICE . THANK YOU!

## 2023-03-24 ENCOUNTER — OFFICE VISIT (OUTPATIENT)
Dept: ORTHOPEDIC SURGERY | Facility: CLINIC | Age: 88
End: 2023-03-24
Payer: MEDICARE

## 2023-03-24 VITALS — BODY MASS INDEX: 13.91 KG/M2 | HEIGHT: 65 IN | WEIGHT: 83.5 LBS | TEMPERATURE: 96.6 F

## 2023-03-24 DIAGNOSIS — M19.019 SHOULDER ARTHRITIS: ICD-10-CM

## 2023-03-24 DIAGNOSIS — R52 PAIN: Primary | ICD-10-CM

## 2023-03-24 PROCEDURE — 20610 DRAIN/INJ JOINT/BURSA W/O US: CPT | Performed by: ORTHOPAEDIC SURGERY

## 2023-03-24 PROCEDURE — 73030 X-RAY EXAM OF SHOULDER: CPT | Performed by: ORTHOPAEDIC SURGERY

## 2023-03-24 PROCEDURE — 99214 OFFICE O/P EST MOD 30 MIN: CPT | Performed by: ORTHOPAEDIC SURGERY

## 2023-03-24 RX ORDER — METHYLPREDNISOLONE ACETATE 80 MG/ML
80 INJECTION, SUSPENSION INTRA-ARTICULAR; INTRALESIONAL; INTRAMUSCULAR; SOFT TISSUE
Status: COMPLETED | OUTPATIENT
Start: 2023-03-24 | End: 2023-03-24

## 2023-03-24 RX ORDER — LIDOCAINE HYDROCHLORIDE 10 MG/ML
2 INJECTION, SOLUTION EPIDURAL; INFILTRATION; INTRACAUDAL; PERINEURAL
Status: COMPLETED | OUTPATIENT
Start: 2023-03-24 | End: 2023-03-24

## 2023-03-24 RX ADMIN — LIDOCAINE HYDROCHLORIDE 2 ML: 10 INJECTION, SOLUTION EPIDURAL; INFILTRATION; INTRACAUDAL; PERINEURAL at 09:54

## 2023-03-24 RX ADMIN — METHYLPREDNISOLONE ACETATE 80 MG: 80 INJECTION, SUSPENSION INTRA-ARTICULAR; INTRALESIONAL; INTRAMUSCULAR; SOFT TISSUE at 09:54

## 2023-03-24 NOTE — PROGRESS NOTES
Patient:Brittany Villeda    YOB: 1935    Medical Record Number:[id number]    Chief Complaints:  Bilateral shoulder osteoarthritis    History of Present Illness:     87 y.o. female patient who presents for evaluation of both shoulders.  This is an issue for which I last saw her roughly 3 years ago.  At that time we had discussed surgical and nonsurgical treatment.  Since then, she has had a a number of medical issues crop up.  She says the shoulder pain seems to be steadily getting worse.  Both shoulders bother her but the right is the worst of the 2.  Pain is moderate to severe, constant and aching.  She reports limited motion and function.  She would like to get the shoulders fixed but she recognizes that she is in no condition to consider having that surgery.  She comes in today hoping for something to alleviate the pain.    Allergies   Allergen Reactions   • Codeine Hallucinations     Tolerates hydromorphone   • Amitriptyline Rash   • Amoxicillin-Pot Clavulanate Rash   • Aspirin Unknown - Low Severity     Patient doesn't know why   • Bactrim [Sulfamethoxazole-Trimethoprim] Rash   • Carisoprodol-Aspirin-Codeine Palpitations   • Iodinated Contrast Media Rash   • Latex Rash   • Naproxen Rash   • Nsaids Unknown - Low Severity     unknown   • Soma Compound With Codeine [Carisoprodol-Aspirin-Codeine] Rash   • Sulfa Antibiotics Rash   • Tramadol Palpitations     heart races          Current Outpatient Medications:   •  acetaminophen (TYLENOL) 500 MG tablet, Take 1 tablet by mouth Every 6 (Six) Hours As Needed for Mild Pain ., Disp: , Rfl:   •  albuterol sulfate  (90 Base) MCG/ACT inhaler, Inhale 2 puffs Every 4 (Four) Hours As Needed for Wheezing (or cough)., Disp: 18 g, Rfl: 0  •  ELDERBERRY PO, Take 2 tablets by mouth Daily., Disp: , Rfl:   •  furosemide (LASIX) 20 MG tablet, Take 1 tablet by mouth Daily As Needed., Disp: , Rfl:   •  Magnesium Oxide 400 (240 Mg) MG tablet, TAKE 1 TABLET EVERY  DAY, Disp: 90 tablet, Rfl: 1  •  mesalamine (APRISO) 0.375 g 24 hr capsule, TAKE 4 CAPSULES EVERY DAY, Disp: 360 capsule, Rfl: 3  •  pantoprazole (PROTONIX) 40 MG EC tablet, TAKE 1 TABLET TWICE DAILY, Disp: 180 tablet, Rfl: 1  •  potassium chloride (K-DUR,KLOR-CON) 10 MEQ CR tablet, TAKE 1 TABLET EVERY DAY, Disp: 90 tablet, Rfl: 1  •  vitamin B-12 (CYANOCOBALAMIN) 1000 MCG tablet, TAKE 1 TABLET EVERY DAY, Disp: 90 tablet, Rfl: 1  •  estradiol (ESTRACE VAGINAL) 0.1 MG/GM vaginal cream, Insert 2 g into the vagina Daily. (Patient not taking: Reported on 3/24/2023), Disp: 42.5 g, Rfl: 3  •  phenazopyridine (Pyridium) 200 MG tablet, Take 1 tablet by mouth 3 (Three) Times a Day As Needed for Bladder Spasms. (Patient not taking: Reported on 3/24/2023), Disp: 30 tablet, Rfl: 0    Past Medical History:   Diagnosis Date   • Acute UTI (urinary tract infection) 04/08/2021   • Anemia    • Arrhythmia    • Arthritis    • Asthma    • Bradycardia    • Chest pain    • Choledocholithiasis 05/09/2021   • Colitis    • COPD (chronic obstructive pulmonary disease) (Formerly [facility])    • Diastolic dysfunction    • Essential hypertension 05/12/2016   • GERD (gastroesophageal reflux disease)    • Heart block    • Hernia    • Hypertension    • Hypertensive heart disease    • Hyperthyroidism    • Kidney stone    • Leukopenia    • Low back pain    • MGUS (monoclonal gammopathy of unknown significance)    • Nephrolithiasis    • Obesity    • Paroxysmal atrial fibrillation ([facility])    • Peptic ulceration    • Persistent atrial fibrillation ([facility])    • PSVT (paroxysmal supraventricular tachycardia) ([facility])    • Pulmonary hypertension ([facility])    • Rectal bleed    • Systemic hypertension    • Trifascicular block    • 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Formerly [facility])    • Ventricular tachycardia     nonsustained   • Vertigo        Past Surgical History:   Procedure Laterality Date   • BACK SURGERY      lumbar fusion   • PAWAN HOLE Left 08/08/2021    Procedure:  Left-sided dusty holes for evacuation of subdural hematoma;  Surgeon: Gustavo Callaway MD;  Location: [facility] MAIN OR;  Service: Neurosurgery;  Laterality: Left;   • CARDIAC ELECTROPHYSIOLOGY PROCEDURE N/A 11/07/2018    Procedure: Pacemaker DC new   [place];  Surgeon: Jesus Granda MD;  Location: [facility] CATH INVASIVE LOCATION;  Service: Cardiology   • CHOLECYSTECTOMY     • COLONOSCOPY  06/16/2014    colitis, cryptitis,  tics, NBIH, TA w/low grade dysplasia   • COLONOSCOPY N/A 10/28/2019    Procedure: COLONOSCOPY WITH COLD AND HOT POLYPECTOMIES;  Surgeon: Micaela English MD;  Location: [facility] ENDOSCOPY;  Service: Gastroenterology   • ENDOSCOPY N/A 05/13/2021    Procedure: ESOPHAGOGASTRODUODENOSCOPY with BX;  Surgeon: Stefan Mcfadden MD;  Location: [facility] ENDOSCOPY;  Service: Gastroenterology;  Laterality: N/A;  EPIGASTRIC PAIN  --DUODENAL ULCER, HEMORRHAGIC GASTRITIS, HIATAL HERNIA    • ENDOSCOPY N/A 10/11/2022    Procedure: ESOPHAGOGASTRODUODENOSCOPY;  Surgeon: Micaela English MD;  Location: [facility] ENDOSCOPY;  Service: Gastroenterology;  Laterality: N/A;  PRE- MELENA  POST- ESOPHAGITIS   • HEMORRHOIDECTOMY     • HERNIA REPAIR      umbilical   • HYSTERECTOMY     • JOINT REPLACEMENT     • KNEE ARTHROPLASTY     • MYOMECTOMY     • PACEMAKER IMPLANTATION     • SHOULDER SURGERY     • SIGMOIDOSCOPY N/A 11/02/2022    Procedure: SIGMOIDOSCOPY FLEXIBLE WITH COLD BIOPSIES AND ASPIRATE;  Surgeon: Micaela English MD;  Location: [facility] ENDOSCOPY;  Service: Gastroenterology;  Laterality: N/A;  PRE- RECTAL BLEEDING  POST- HEMORRHOIDS, DIVERTICULOSIS, COLITIS   • SINUS SURGERY     • TOE NAIL AMPUTATION  03/04/2019   • TONSILLECTOMY         Social History     Occupational History   • Occupation: Caregiver     Employer: RETIRED     Comment: worked for [employer]   Tobacco Use   • Smoking status: Former     Packs/day: 1.50     Years: 10.00     Pack years: 15.00     Types: Cigarettes     Start date:  "     Quit date: 1965     Years since quittin.2   • Smokeless tobacco: Never   • Tobacco comments:     QUIT SMOKING    Vaping Use   • Vaping Use: Never used   Substance and Sexual Activity   • Alcohol use: No   • Drug use: Never   • Sexual activity: Defer      Social History     Social History Narrative   • Not on file       Family History   Problem Relation Age of Onset   • Diabetes Mother    • Breast cancer Sister    • Kidney cancer Sister    • Heart disease Sister    • Prostate cancer Brother    • Prostate cancer Brother    • Prostate cancer Brother    • Malig Hyperthermia Neg Hx        Review of Systems:      Constitutional: Denies fever, shaking or chills   Eyes: Denies change in visual acuity   HEENT: Denies nasal congestion or sore throat   Respiratory: Denies cough or shortness of breath   Cardiovascular: Denies chest pain or edema  Endocrine: Denies tremors, palpitations, intolerance of heat or cold, polyuria, polydipsia.  GI: Denies abdominal pain, nausea, vomiting, bloody stools or diarrhea  : Denies frequency, urgency, incontinence, retention, or nocturia.  Musculoskeletal: Denies numbness, tingling or loss of motor function except as above  Integument: Denies rash, lesion or ulceration   Neurologic: Denies headache or focal weakness, deficits  Heme: Denies spontaneous or excessive bleeding, epistaxis, hematuria, melena, fatigue, enlarged or tender lymph nodes.      All other pertinent positives and negatives as noted above in HPI.    Physical Exam:87 y.o. female  Vitals:    23 0915   Temp: 96.6 °F (35.9 °C)   TempSrc: Temporal   Weight: 37.9 kg (83 lb 8 oz)   Height: 165.1 cm (65\")     General:  Patient is awake and alert.  Appears in no acute distress or discomfort.    Psych:  Affect and demeanor are appropriate.    Extremities: Both shoulders are both examined.  Skin is benign.  Moderate bursal effusion on the right, trace bursal effusion on the left.  No increased warmth or " erythema.  Active and passive motion are limited in both shoulders.  She has crepitus with range of motion.  She has pain and weakness with abduction and forward elevation.    Imaging: Bilateral shoulder AP, scapular Y and axillary views are ordered and reviewed to evaluate her complaint.  These are compared to previous x-rays.  She has advanced glenohumeral arthritis bilaterally with bone-on-bone, osteophyte formation and subchondral sclerosis.  Acromiohumeral interval measures 4 mm on the right and 5 on the left.    Assessment/Plan:  Bilateral shoulder cuff tear arthropathy    I showed her and her daughter the x-rays.  We discussed the significance of the findings.  We discussed her options, both surgical and nonsurgical.  She is really just looking for some relief.  I counseled her that injections may give her some temporary relief and this is something that we could certainly repeat again in the future.  She is interested in pursuing the injections today.  The risk, benefits and alternatives were discussed including the elevated risk with bilateral injections.  She acknowledged understanding and consented.  The injections were performed as described below.    Wes Roldan MD    03/24/2023  Large Joint Arthrocentesis: R subacromial bursa  Date/Time: 3/24/2023 9:54 AM  Consent given by: patient  Site marked: site marked  Timeout: Immediately prior to procedure a time out was called to verify the correct patient, procedure, equipment, support staff and site/side marked as required   Supporting Documentation  Indications: pain and joint swelling   Procedure Details  Location: shoulder - R subacromial bursa  Preparation: Patient was prepped and draped in the usual sterile fashion  Needle gauge: 21 G.  Approach: posterior  Medications administered: 80 mg methylPREDNISolone acetate 80 MG/ML; 2 mL lidocaine PF 1% 1 %  Patient tolerance: patient tolerated the procedure well with no immediate complications    Large  Joint Arthrocentesis: L subacromial bursa  Date/Time: 3/24/2023 9:54 AM  Consent given by: patient  Site marked: site marked  Timeout: Immediately prior to procedure a time out was called to verify the correct patient, procedure, equipment, support staff and site/side marked as required   Supporting Documentation  Indications: pain   Procedure Details  Location: shoulder - L subacromial bursa  Preparation: Patient was prepped and draped in the usual sterile fashion  Needle gauge: 21 G.  Approach: posterior  Medications administered: 80 mg methylPREDNISolone acetate 80 MG/ML; 2 mL lidocaine PF 1% 1 %  Patient tolerance: patient tolerated the procedure well with no immediate complications

## 2023-04-15 ENCOUNTER — APPOINTMENT (OUTPATIENT)
Dept: CT IMAGING | Facility: HOSPITAL | Age: 88
End: 2023-04-15
Payer: MEDICARE

## 2023-04-15 ENCOUNTER — APPOINTMENT (OUTPATIENT)
Dept: GENERAL RADIOLOGY | Facility: HOSPITAL | Age: 88
End: 2023-04-15
Payer: MEDICARE

## 2023-04-15 ENCOUNTER — HOSPITAL ENCOUNTER (EMERGENCY)
Facility: HOSPITAL | Age: 88
Discharge: HOME | End: 2023-04-16
Attending: EMERGENCY MEDICINE | Admitting: EMERGENCY MEDICINE
Payer: MEDICARE

## 2023-04-15 DIAGNOSIS — R07.81 PLEURITIC CHEST PAIN: Primary | ICD-10-CM

## 2023-04-15 DIAGNOSIS — J44.9 CHRONIC OBSTRUCTIVE PULMONARY DISEASE, UNSPECIFIED COPD TYPE: ICD-10-CM

## 2023-04-15 DIAGNOSIS — I27.20 PULMONARY HYPERTENSION: ICD-10-CM

## 2023-04-15 LAB
ALBUMIN SERPL-MCNC: 3.9 G/DL (ref 3.5–5.2)
ALBUMIN/GLOB SERPL: 1.1 G/DL
ALP SERPL-CCNC: 71 U/L (ref 39–117)
ALT SERPL W P-5'-P-CCNC: 11 U/L (ref 1–33)
ANION GAP SERPL CALCULATED.3IONS-SCNC: 7 MMOL/L (ref 5–15)
AST SERPL-CCNC: 16 U/L (ref 1–32)
BILIRUB SERPL-MCNC: 1.3 MG/DL (ref 0–1.2)
BUN SERPL-MCNC: 15 MG/DL (ref 8–23)
BUN/CREAT SERPL: 18.5 (ref 7–25)
CALCIUM SPEC-SCNC: 10.2 MG/DL (ref 8.6–10.5)
CHLORIDE SERPL-SCNC: 107 MMOL/L (ref 98–107)
CO2 SERPL-SCNC: 27 MMOL/L (ref 22–29)
CREAT SERPL-MCNC: 0.81 MG/DL (ref 0.57–1)
D DIMER PPP FEU-MCNC: 10.26 MCGFEU/ML (ref 0–0.88)
DEPRECATED RDW RBC AUTO: 46.5 FL (ref 37–54)
EGFRCR SERPLBLD CKD-EPI 2021: 69.9 ML/MIN/1.73
ERYTHROCYTE [DISTWIDTH] IN BLOOD BY AUTOMATED COUNT: 13.2 % (ref 12.3–15.4)
GEN 5 2HR TROPONIN T REFLEX: 22 NG/L
GLOBULIN UR ELPH-MCNC: 3.4 GM/DL
GLUCOSE SERPL-MCNC: 103 MG/DL (ref 65–99)
HCT VFR BLD AUTO: 34.1 % (ref 34–46.6)
HGB BLD-MCNC: 11.6 G/DL (ref 12–15.9)
HOLD SPECIMEN: NORMAL
HOLD SPECIMEN: NORMAL
LYMPHOCYTES # BLD MANUAL: 1.7 10*3/MM3 (ref 0.7–3.1)
LYMPHOCYTES NFR BLD MANUAL: 4 % (ref 5–12)
MCH RBC QN AUTO: 33.1 PG (ref 26.6–33)
MCHC RBC AUTO-ENTMCNC: 34 G/DL (ref 31.5–35.7)
MCV RBC AUTO: 97.4 FL (ref 79–97)
MONOCYTES # BLD: 0.11 10*3/MM3 (ref 0.1–0.9)
NEUTROPHILS # BLD AUTO: 1.04 10*3/MM3 (ref 1.7–7)
NEUTROPHILS NFR BLD MANUAL: 36.4 % (ref 42.7–76)
PLAT MORPH BLD: NORMAL
PLATELET # BLD AUTO: 106 10*3/MM3 (ref 140–450)
PMV BLD AUTO: 9.3 FL (ref 6–12)
POTASSIUM SERPL-SCNC: 4.5 MMOL/L (ref 3.5–5.2)
PROT SERPL-MCNC: 7.3 G/DL (ref 6–8.5)
QT INTERVAL: 476 MS
RBC # BLD AUTO: 3.5 10*6/MM3 (ref 3.77–5.28)
RBC MORPH BLD: NORMAL
SODIUM SERPL-SCNC: 141 MMOL/L (ref 136–145)
TROPONIN T DELTA: -3 NG/L
TROPONIN T SERPL HS-MCNC: 25 NG/L
VARIANT LYMPHS NFR BLD MANUAL: 59.6 % (ref 19.6–45.3)
WBC MORPH BLD: NORMAL
WBC NRBC COR # BLD: 2.86 10*3/MM3 (ref 3.4–10.8)
WHOLE BLOOD HOLD COAG: NORMAL
WHOLE BLOOD HOLD SPECIMEN: NORMAL

## 2023-04-15 PROCEDURE — 84484 ASSAY OF TROPONIN QUANT: CPT

## 2023-04-15 PROCEDURE — 25010000002 DIPHENHYDRAMINE PER 50 MG: Performed by: EMERGENCY MEDICINE

## 2023-04-15 PROCEDURE — 36415 COLL VENOUS BLD VENIPUNCTURE: CPT

## 2023-04-15 PROCEDURE — 85007 BL SMEAR W/DIFF WBC COUNT: CPT

## 2023-04-15 PROCEDURE — 71046 X-RAY EXAM CHEST 2 VIEWS: CPT

## 2023-04-15 PROCEDURE — 96374 THER/PROPH/DIAG INJ IV PUSH: CPT

## 2023-04-15 PROCEDURE — 99284 EMERGENCY DEPT VISIT MOD MDM: CPT

## 2023-04-15 PROCEDURE — 85379 FIBRIN DEGRADATION QUANT: CPT | Performed by: EMERGENCY MEDICINE

## 2023-04-15 PROCEDURE — 80053 COMPREHEN METABOLIC PANEL: CPT

## 2023-04-15 PROCEDURE — 85025 COMPLETE CBC W/AUTO DIFF WBC: CPT

## 2023-04-15 PROCEDURE — 71275 CT ANGIOGRAPHY CHEST: CPT

## 2023-04-15 PROCEDURE — 93005 ELECTROCARDIOGRAM TRACING: CPT

## 2023-04-15 PROCEDURE — 25510000001 IOPAMIDOL PER 1 ML: Performed by: EMERGENCY MEDICINE

## 2023-04-15 RX ORDER — DIPHENHYDRAMINE HYDROCHLORIDE 50 MG/ML
50 INJECTION INTRAMUSCULAR; INTRAVENOUS ONCE
Status: COMPLETED | OUTPATIENT
Start: 2023-04-15 | End: 2023-04-15

## 2023-04-15 RX ORDER — SODIUM CHLORIDE 0.9 % (FLUSH) 0.9 %
10 SYRINGE (ML) INJECTION AS NEEDED
Status: DISCONTINUED | OUTPATIENT
Start: 2023-04-15 | End: 2023-04-16 | Stop reason: HOSPADM

## 2023-04-15 RX ADMIN — DIPHENHYDRAMINE HYDROCHLORIDE 50 MG: 50 INJECTION, SOLUTION INTRAMUSCULAR; INTRAVENOUS at 21:01

## 2023-04-15 RX ADMIN — IOPAMIDOL 85 ML: 755 INJECTION, SOLUTION INTRAVENOUS at 22:20

## 2023-04-15 NOTE — ED PROVIDER NOTES
EMERGENCY DEPARTMENT ENCOUNTER    Room Number:  37/37  Date seen:  4/15/2023  PCP: Mega Esparza MD  Historian: Patient, daughters at bedside      HPI:  Chief Complaint: Chest pain  A complete HPI/ROS/PMH/PSH/SH/FH are unobtainable due to:   Context: Brittany Villeda is a 88 y.o. female who presents to the ED c/o chest pain.  Patient had onset of chest pain about 2:00.  Patient came on abruptly while she was at rest.  Pain is described as a sharp pain in the left chest.  Pain is worsened with deep breath.  Pain is currently gone.  Denies history of prior pain in the past.  Patient denies shortness of breath.  Denies fever.  Denies recent illness.      MEDICAL RECORD REVIEW (non ED)  I reviewed prior medical records including most recent cardiology office note.  Patient has a history of paroxysmal atrial fibrillation, sick sinus syndrome with pacemaker as well as pulmonary hypertension.  No documented coronary artery disease.  Chronic medical problems include ulcerative colitis, ICH, COPD and atrial fibrillation.  Patient is not on strong anticoagulation.    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   • Leukopenia 09/12/2016   • MGUS (monoclonal gammopathy of unknown significance) 09/16/2016   • Ulcerative rectosigmoiditis without complication 11/30/2017   • Other chest pain 12/24/2017   • Lichen sclerosus 08/23/2017   • Heart block 10/30/2018   • Sleep-related hypoxia 12/22/2018   • Bradycardia 12/29/2018   • Shoulder arthritis 01/28/2019   • History of atrial  fibrillation 03/19/2019   • Pacemaker 03/19/2019   • Paroxysmal SVT (supraventricular tachycardia) 05/08/2019   • History of chest pain 05/08/2019   • Palpitations 05/08/2019   • Atrial fibrillation 10/06/2019   • Rectal bleeding 10/15/2019   • Arthritis 12/13/2019   • Ascending aortic aneurysm 08/24/2020   • Mediastinal lymphadenopathy 09/09/2020   • Immobility 11/20/2020   • Left knee pain 11/20/2020   • Hemarthrosis of left knee 11/20/2020   • Anemia    • Obesity (BMI 30-39.9)    • Generalized weakness 04/08/2021   • Dysautonomia 04/09/2021   • Weakness of both lower extremities 04/09/2021   • Macrocytosis 04/11/2021   • Hypercalcemia 04/11/2021   • Arthritis of right wrist 04/13/2021   • Hyperparathyroidism 04/28/2021   • Choledocholithiasis 05/09/2021   • Essential hypertension 05/10/2021   • Hyperglycemia 05/10/2021   • Epigastric pain 05/12/2021   • Duodenal ulcer 05/13/2021   • Hemorrhagic gastritis 05/13/2021   • Exertional dyspnea 06/22/2021   • COPD (chronic obstructive pulmonary disease) ([facility])    • Functional quadriplegia 11/20/2020   • Hip pain 04/12/2018   • Osteoarthritis of glenohumeral joint 07/12/2018   • Other malaise and fatigue 05/25/2021   • Shoulder pain 04/12/2018   • ICH (intracerebral hemorrhage) 08/05/2021   • Melena 09/29/2022   • GI bleed 11/01/2022   • Lower GI bleed 11/01/2022   • Thrombocytopenia 11/01/2022   • Lichen sclerosus of female genitalia 03/13/2023   • Senile atrophic vaginitis 03/13/2023     Resolved Ambulatory Problems     Diagnosis Date Noted   • Abnormal weight loss 05/12/2016   • Tachycardia 05/12/2016   • Nausea and vomiting 05/12/2016   • Hyperthyroidism 05/12/2016   • Fatigue 05/12/2016   • Dizziness 05/12/2016   • Diarrhea 05/12/2016   • Abnormal thyroid stimulating hormone (TSH) level 05/12/2016   • Abdominal pain 05/12/2016   • Abnormal EKG 06/30/2016   • Chronic anticoagulation 11/20/2020   • Acute UTI (urinary tract infection) 04/08/2021   • Spondylosis of  lumbosacral region without myelopathy or radiculopathy 04/09/2021   • Hypomagnesemia 04/13/2021   • Subdural hematoma 08/05/2021     Past Medical History:   Diagnosis Date   • Arrhythmia    • Asthma    • Chest pain    • Colitis    • GERD (gastroesophageal reflux disease)    • Hernia    • Hypertension    • Hypertensive heart disease    • Kidney stone    • Low back pain    • Nephrolithiasis    • Obesity    • Paroxysmal atrial fibrillation    • Peptic ulceration    • Persistent atrial fibrillation    • PSVT (paroxysmal supraventricular tachycardia)    • Rectal bleed    • Systemic hypertension    • Ventricular tachycardia    • Vertigo          PAST SURGICAL HISTORY  Past Surgical History:   Procedure Laterality Date   • BACK SURGERY      lumbar fusion   • DUSTY HOLE Left 08/08/2021    Procedure: Left-sided dusty holes for evacuation of subdural hematoma;  Surgeon: Gustavo Callaway MD;  Location: [facility] MAIN OR;  Service: Neurosurgery;  Laterality: Left;   • CARDIAC ELECTROPHYSIOLOGY PROCEDURE N/A 11/07/2018    Procedure: Pacemaker DC new   [place];  Surgeon: Jesus Granda MD;  Location: [facility] CATH INVASIVE LOCATION;  Service: Cardiology   • CHOLECYSTECTOMY     • COLONOSCOPY  06/16/2014    colitis, cryptitis,  tics, NBIH, TA w/low grade dysplasia   • COLONOSCOPY N/A 10/28/2019    Procedure: COLONOSCOPY WITH COLD AND HOT POLYPECTOMIES;  Surgeon: Micaela English MD;  Location: [facility] ENDOSCOPY;  Service: Gastroenterology   • ENDOSCOPY N/A 05/13/2021    Procedure: ESOPHAGOGASTRODUODENOSCOPY with BX;  Surgeon: Stefan Mcfadden MD;  Location: [facility] ENDOSCOPY;  Service: Gastroenterology;  Laterality: N/A;  EPIGASTRIC PAIN  --DUODENAL ULCER, HEMORRHAGIC GASTRITIS, HIATAL HERNIA    • ENDOSCOPY N/A 10/11/2022    Procedure: ESOPHAGOGASTRODUODENOSCOPY;  Surgeon: Micaela English MD;  Location: [facility] ENDOSCOPY;  Service: Gastroenterology;  Laterality: N/A;  PRE- MELENA  POST- ESOPHAGITIS   • HEMORRHOIDECTOMY     •  HERNIA REPAIR      umbilical   • HYSTERECTOMY     • JOINT REPLACEMENT     • KNEE ARTHROPLASTY     • MYOMECTOMY     • PACEMAKER IMPLANTATION     • SHOULDER SURGERY     • SIGMOIDOSCOPY N/A 2022    Procedure: SIGMOIDOSCOPY FLEXIBLE WITH COLD BIOPSIES AND ASPIRATE;  Surgeon: Micaela English MD;  Location: [facility] ENDOSCOPY;  Service: Gastroenterology;  Laterality: N/A;  PRE- RECTAL BLEEDING  POST- HEMORRHOIDS, DIVERTICULOSIS, COLITIS   • SINUS SURGERY     • TOE NAIL AMPUTATION  2019   • TONSILLECTOMY           FAMILY HISTORY  Family History   Problem Relation Age of Onset   • Diabetes Mother    • Breast cancer Sister    • Kidney cancer Sister    • Heart disease Sister    • Prostate cancer Brother    • Prostate cancer Brother    • Prostate cancer Brother    • Malig Hyperthermia Neg Hx          SOCIAL HISTORY  Social History     Socioeconomic History   • Marital status:    • Number of children: 10   • Years of education: High School   Tobacco Use   • Smoking status: Former     Packs/day: 1.50     Years: 10.00     Pack years: 15.00     Types: Cigarettes     Start date:      Quit date:      Years since quittin.3   • Smokeless tobacco: Never   • Tobacco comments:     QUIT SMOKING    Vaping Use   • Vaping Use: Never used   Substance and Sexual Activity   • Alcohol use: No   • Drug use: Never   • Sexual activity: Defer         ALLERGIES  Codeine, Amitriptyline, Amoxicillin-pot clavulanate, Aspirin, Bactrim [sulfamethoxazole-trimethoprim], Carisoprodol-aspirin-codeine, Iodinated contrast media, Latex, Naproxen, Nsaids, Soma compound with codeine [carisoprodol-aspirin-codeine], Sulfa antibiotics, and Tramadol        REVIEW OF SYSTEMS  Review of Systems   Constitutional: Negative for fever.   Respiratory: Negative for shortness of breath.    Cardiovascular: Positive for chest pain (As per HPI) and leg swelling (Chronic, bilateral swelling.  Takes Lasix as needed.).   Gastrointestinal:  Negative for blood in stool.   All other systems reviewed and are negative.           PHYSICAL EXAM  ED Triage Vitals [04/15/23 1717]   Temp Heart Rate Resp BP SpO2   98.3 °F (36.8 °C) 62 (!) 32 130/76 100 %      Temp src Heart Rate Source Patient Position BP Location FiO2 (%)   Oral Monitor -- -- --       Physical Exam    GENERAL: Alert and pleasant female who looks younger than stated age.  Triage vitals reviewed notable for initial blood pressure 130/76.  Temperature heart rate and O2 sats are benign.  HENT: nares patent  EYES: no scleral icterus  CV: regular rhythm, regular rate-no murmur  RESPIRATORY: normal effort, clear to auscultation bilaterally-O2 sats upper 90s on room air  ABDOMEN: soft, obese/nontender  MUSCULOSKELETAL: Chest-there is reproducible tenderness to palpation over the left chest wall.  Lower extremities- moderate chronic appearing bilateral edema.  No significant tenderness to palpation  NEURO: Strength sensation and coordination are grossly intact.  Speech and mentation are unremarkable  SKIN: warm, dry      Vital signs and nursing notes reviewed.          LAB RESULTS  Recent Results (from the past 24 hour(s))   ECG 12 Lead ED Triage Standing Order; Chest Pain    Collection Time: 04/15/23  5:28 PM   Result Value Ref Range    QT Interval 476 ms   Comprehensive Metabolic Panel    Collection Time: 04/15/23  6:18 PM    Specimen: Blood   Result Value Ref Range    Glucose 103 (H) 65 - 99 mg/dL    BUN 15 8 - 23 mg/dL    Creatinine 0.81 0.57 - 1.00 mg/dL    Sodium 141 136 - 145 mmol/L    Potassium 4.5 3.5 - 5.2 mmol/L    Chloride 107 98 - 107 mmol/L    CO2 27.0 22.0 - 29.0 mmol/L    Calcium 10.2 8.6 - 10.5 mg/dL    Total Protein 7.3 6.0 - 8.5 g/dL    Albumin 3.9 3.5 - 5.2 g/dL    ALT (SGPT) 11 1 - 33 U/L    AST (SGOT) 16 1 - 32 U/L    Alkaline Phosphatase 71 39 - 117 U/L    Total Bilirubin 1.3 (H) 0.0 - 1.2 mg/dL    Globulin 3.4 gm/dL    A/G Ratio 1.1 g/dL    BUN/Creatinine Ratio 18.5 7.0 - 25.0     Anion Gap 7.0 5.0 - 15.0 mmol/L    eGFR 69.9 >60.0 mL/min/1.73   High Sensitivity Troponin T    Collection Time: 04/15/23  6:18 PM    Specimen: Blood   Result Value Ref Range    HS Troponin T 25 (H) <10 ng/L   Green Top (Gel)    Collection Time: 04/15/23  6:18 PM   Result Value Ref Range    Extra Tube Hold for add-ons.    Lavender Top    Collection Time: 04/15/23  6:18 PM   Result Value Ref Range    Extra Tube hold for add-on    Gold Top - SST    Collection Time: 04/15/23  6:18 PM   Result Value Ref Range    Extra Tube Hold for add-ons.    Light Blue Top    Collection Time: 04/15/23  6:18 PM   Result Value Ref Range    Extra Tube Hold for add-ons.    CBC Auto Differential    Collection Time: 04/15/23  6:18 PM    Specimen: Blood   Result Value Ref Range    WBC 2.86 (L) 3.40 - 10.80 10*3/mm3    RBC 3.50 (L) 3.77 - 5.28 10*6/mm3    Hemoglobin 11.6 (L) 12.0 - 15.9 g/dL    Hematocrit 34.1 34.0 - 46.6 %    MCV 97.4 (H) 79.0 - 97.0 fL    MCH 33.1 (H) 26.6 - 33.0 pg    MCHC 34.0 31.5 - 35.7 g/dL    RDW 13.2 12.3 - 15.4 %    RDW-SD 46.5 37.0 - 54.0 fl    MPV 9.3 6.0 - 12.0 fL    Platelets 106 (L) 140 - 450 10*3/mm3   D-dimer, Quantitative    Collection Time: 04/15/23  6:18 PM    Specimen: Blood   Result Value Ref Range    D-Dimer, Quantitative 10.26 (H) 0.00 - 0.88 MCGFEU/mL   Manual Differential    Collection Time: 04/15/23  6:18 PM    Specimen: Blood   Result Value Ref Range    Neutrophil % 36.4 (L) 42.7 - 76.0 %    Lymphocyte % 59.6 (H) 19.6 - 45.3 %    Monocyte % 4.0 (L) 5.0 - 12.0 %    Neutrophils Absolute 1.04 (L) 1.70 - 7.00 10*3/mm3    Lymphocytes Absolute 1.70 0.70 - 3.10 10*3/mm3    Monocytes Absolute 0.11 0.10 - 0.90 10*3/mm3    RBC Morphology Normal Normal    WBC Morphology Normal Normal    Platelet Morphology Normal Normal   High Sensitivity Troponin T 2Hr    Collection Time: 04/15/23 10:37 PM    Specimen: Blood   Result Value Ref Range    HS Troponin T 22 (H) <10 ng/L    Troponin T Delta -3 >=-4 - <+4 ng/L        Ordered the above labs and independently interpreted results. My findings will be discussed in the medical decision making section below        RADIOLOGY  XR Chest 2 View    Result Date: 4/15/2023  XR CHEST 2 VW-  04/15/2023  HISTORY: Chest pain.  Heart size is mildly enlarged. Lungs are underinflated with some vascular crowding. There is elevation of the right hemidiaphragm similar to the 01/24/2022 study. Mildly prominent soft tissue seen in the medial aspects of the right upper and left upper hemithorax likely related to enlarged thyroid gland seen well on the CT scan of 10/25/2021.  Cardiac pacemaker seen in good position.      1. Mild cardiomegaly. 2. Underinflation of the lungs which appear clear.  This report was finalized on 4/15/2023 5:58 PM by Dr. Gordy Nance M.D.      CT Angiogram Chest    Result Date: 4/15/2023  CT ANGIOGRAM OF THE CHEST  HISTORY: Pleuritic chest pain  COMPARISON: 10/25/2021  TECHNIQUE: Axial CT imaging was obtained through the thorax. IV contrast was administered. Three-D reformatted images were obtained.  FINDINGS: No acute pulmonary thromboembolus is seen. There is enlargement of the main pulmonary artery, which can be seen in the setting of pulmonary arterial hypertension. Examination was not optimized for assessment of the thoracic aorta. It appears to be normal in caliber. It is tortuous in its course. There is reflux of contrast material into the hepatic veins, which can be seen in the setting of right-sided heart failure. There are coronary artery calcifications. Heart is enlarged. The thyroid gland is markedly enlarged and heterogeneous, resulting in some mass effect upon the trachea. Similar findings were present on the prior study. The patient has some enlarged mediastinal lymph nodes. For example, a precarinal node measures up to 2.6 x 1.9 cm. Previously, it measured approximately 2.3 x 1.9 cm. Esophagus is within normal limits. There is elevation of the right  hemidiaphragm, with associated bronchovascular crowding. There are background changes of COPD. There is left basilar scarring. Images through the upper abdomen demonstrate bilateral renal cysts, including parapelvic cysts on the left. There are prominent extrarenal pelves bilaterally. Gallbladder is absent. There is body wall edema. No acute osseous abnormalities are seen. Prominent chest wall collaterals may be related to some stenosis of the left subclavian vein, secondary to the patient's pacemaker.       1. No acute pulmonary thromboembolus seen. 2. Cardiac enlargement. There is also reflux of contrast material into the hepatic veins, which can be seen in the setting of right-sided heart failure. 3. Enlarged mediastinal lymph nodes. Patient has had similar findings on prior imaging, although I think they are larger on the current study. Short-term CT follow-up in 3-6 months is suggested.  Radiation dose reduction techniques were utilized, including automated exposure control and exposure modulation based on body size.  This report was finalized on 4/15/2023 11:29 PM by Dr. Luna Oleary M.D.        I ordered and independently reviewed the above noted radiographic studies.      I viewed images of chest x-ray which showed cardiomegaly, no obvious disease per my independent interpretation.    See radiologist's dictation for official interpretation.             PROCEDURES  Procedures          MEDICATIONS GIVEN IN ER  Medications   sodium chloride 0.9 % flush 10 mL (has no administration in time range)   diphenhydrAMINE (BENADRYL) injection 50 mg (50 mg Intravenous Given 4/15/23 2101)   iopamidol (ISOVUE-370) 76 % injection 100 mL (85 mL Intravenous Given by Other 4/15/23 2220)               MEDICAL DECISION MAKING, PROGRESS, and CONSULTS    All labs have been independently reviewed by me.  All radiology studies have been reviewed by me and I have also reviewed the radiology report.   EKG's independently viewed  and interpreted by me.  Discussion below represents my analysis of pertinent findings related to patient's condition, differential diagnosis, treatment plan and final disposition.      Additional sources:  - Discussed/ obtained information from independent historians: EMS who brought patient, daughters at bedside    - External (non-ED) record review: Please see documented above    - Chronic or social conditions impacting care: Atrial fibrillation, obstructive sleep apnea, pulmonary hypertension, age 88, chronic lower extremity edema    - Shared decision making: I discussed ED evaluation and treatment plan with patient who is in agreement.  Complicated patient with pleuritic chest pain onset earlier today.  ED testing reviewed worrisome for possible pulmonary embolism.  D-dimer was elevated and CT angiogram of the chest was obtained.  No obvious pulmonary embolism.  Patient does have certainly reproducible tenderness palpation in favor chest wall related pain.  She has been relatively pain-free here in the ED and vitals have been reassuring.  Will discharge home to continue current medications and return for increased chest pain, shortness of breath or as needed.      Orders placed during this visit:  Orders Placed This Encounter   Procedures   • XR Chest 2 View   • CT Angiogram Chest   • [place] Draw   • Comprehensive Metabolic Panel   • High Sensitivity Troponin T   • CBC Auto Differential   • D-dimer, Quantitative   • Manual Differential   • High Sensitivity Troponin T 2Hr   • NPO Diet NPO Type: Strict NPO   • Undress and Gown   • Cardiac Monitoring   • Continuous Pulse Oximetry   • Oxygen Therapy- Nasal Cannula; 2 LPM; Titrate for SPO2: equal to or greater than, 92%   • ECG 12 Lead ED Triage Standing Order; Chest Pain   • ECG 12 Lead ED Triage Standing Order; Chest Pain   • Insert Peripheral IV   • CBC & Differential   • Green Top (Gel)   • Lavender Top   • Gold Top - SST   • Light Blue Top           Differential  diagnosis:    Please see as documented below in ED course      Independent interpretation of labs, radiology studies, and discussions with consultants:  ED Course as of 04/15/23 2356   Sat Apr 15, 2023   1803 Chest x-ray independently interpreted by me shows cardiomegaly and arthritic changes.  No obvious acute pulmonary disease.    Official radiology interpretation by Dr. Nance is in agreement with my assessment. [DB]   1804 EKG independently interpreted by me    Time 5:28 PM  Paced rhythm, 60  P waves-A-fib  QRS-ventricular paced with IVCD  ST, T waves-nonspecific changes    Compared to 2001 to not significant change [DB]   1805 NEZ-29-hjun-old female with history of pulmonary hypertension, atrial fibrillation presents with several hours of left-sided pleuritic chest pain.  Pain currently resolved.    On exam she is well-appearing with fairly benign vitals.  She does have reproducible tenderness to palpation over the left chest wall.    Assessment-story is somewhat atypical for acute coronary syndrome but still considered.  Pain was pleuritic and is reproducible to palpation so certainly would consider musculoskeletal pain although she denies any recent injury.  Would also consider acute pulmonary embolism in a patient who has fairly significant bilateral lower extremity edema although it is symmetric. [DB]   1931 Labs reviewed:    Chemistries are fairly unremarkable with benign renal and hepatic function.  Electrolytes unremarkable.  CBC shows slightly neutropenic white count with hemoglobin of 11.6, white blood count 2.9. [DB]   2020 Patient with very markedly elevated D-dimer of 10.3, worrisome for possible pulmonary embolism.  Will obtain CTA of the chest for further assessment. [DB]   2337 CT angiogram of the chest reviewed with Dr. Oleary shows no obvious pulmonary embolism.  There are signs of right heart failure which is known in this patient with history of pulmonary hypertension. [DB]      ED  Course User Index  [DB] Luc Begum MD             I used full protective equipment while examining this patient.  This includes face mask, gloves and protective eyewear.  I washed my hands before entering the room and immediately upon leaving the room    DIAGNOSIS  Final diagnoses:   Pleuritic chest pain   Chronic obstructive pulmonary disease, unspecified COPD type   Pulmonary hypertension         DISPOSITION  DISCHARGE    Patient discharged in stable condition.    Reviewed implications of results, diagnosis, meds, responsibility to follow up, warning signs and symptoms of possible worsening, potential complications and reasons to return to ER, including increased chest pain, shortness of breath or as needed.    Patient/Family voiced understanding of above instructions.    Discussed plan for discharge, as there is no emergent indication for admission. Patient referred to primary care provider for BP management due to today's BP. Pt/family is agreeable and understands need for follow up and repeat testing.  Pt is aware that discharge does not mean that nothing is wrong but it indicates no emergency is present that requires admission and they must continue care with follow-up as given below or physician of their choice.     FOLLOW-UP  Mega Esparza MD  [address] [address]  [phone]    In 1 week  If Not Better         Medication List      No changes were made to your prescriptions during this visit.                   Latest Documented Vital Signs:  As of 23:56 EDT  BP- 130/76 HR- 62 Temp- 98.3 °F (36.8 °C) (Oral) O2 sat- 100%              --    Please note that portions of this were completed with a voice recognition program.       Note Disclaimer: At [facility], we believe that sharing information builds trust and better relationships. You are receiving this note because you are receiving care at [facility] or recently visited. It is possible you will see [facility]  information before a provider has talked with you about it. This kind of information can be easy to misunderstand. To help you fully understand what it means for your health, we urge you to discuss this note with your provider.           Luc Begum MD  04/15/23 3551       Luc Begum MD  04/15/23 6439

## 2023-04-15 NOTE — ED NOTES
pt to ed from home via EMS.    pt c/o CP x2 hours, no radiation. pt hx afib, and has pacemaker. Pt took 1 nitro PTA

## 2023-04-16 VITALS
SYSTOLIC BLOOD PRESSURE: 130 MMHG | TEMPERATURE: 98.3 F | WEIGHT: 186.6 LBS | BODY MASS INDEX: 31.86 KG/M2 | OXYGEN SATURATION: 98 % | HEART RATE: 61 BPM | HEIGHT: 64 IN | DIASTOLIC BLOOD PRESSURE: 76 MMHG | RESPIRATION RATE: 32 BRPM

## 2023-04-16 NOTE — DISCHARGE INSTRUCTIONS
ED testing did not show evidence of heart attack or blood clots.  I suspect most likely cause is chest wall related pain.    Do not hesitate to return for increased chest pain, shortness of breath or as needed.

## 2023-04-20 ENCOUNTER — PATIENT OUTREACH (OUTPATIENT)
Dept: CASE MANAGEMENT | Facility: OTHER | Age: 88
End: 2023-04-20
Payer: MEDICARE

## 2023-04-20 NOTE — OUTREACH NOTE
Patient Outreach    AMBULATORY CASE MANAGEMENT NOTE    Name and Relationship of Patient/Support Person: Brittany Villeda S - Self    Call placed to patient answered by her daughter Amina. Introduced self and role of ACM. She states that her mother is doing well. They do have an appointment with PCP 4/21. No questions, concerns or needs regarding health wellness. Pt given number for 24/7 hotline. Pt appreciative of phone call and declined to participate in  Case Management Program. No needs identified. Did suggest to daughter to keep ACM number in case of need in the future.         Hayley VERNON  Ambulatory Case Management    4/20/2023, 12:48 EDT

## 2023-04-21 ENCOUNTER — OFFICE VISIT (OUTPATIENT)
Dept: FAMILY MEDICINE CLINIC | Facility: CLINIC | Age: 88
End: 2023-04-21
Payer: MEDICARE

## 2023-04-21 VITALS
HEIGHT: 64 IN | HEART RATE: 65 BPM | WEIGHT: 189.6 LBS | DIASTOLIC BLOOD PRESSURE: 68 MMHG | TEMPERATURE: 96.9 F | SYSTOLIC BLOOD PRESSURE: 126 MMHG | OXYGEN SATURATION: 98 % | BODY MASS INDEX: 32.37 KG/M2

## 2023-04-21 DIAGNOSIS — Z00.00 MEDICARE ANNUAL WELLNESS VISIT, SUBSEQUENT: Primary | ICD-10-CM

## 2023-04-21 DIAGNOSIS — R07.89 OTHER CHEST PAIN: ICD-10-CM

## 2023-04-21 NOTE — PROGRESS NOTES
The ABCs of the Annual Wellness Visit  Subsequent Medicare Wellness Visit    Subjective    Brittany Villeda is a 88 y.o. female who presents for a Subsequent Medicare Wellness Visit.    The following portions of the patient's history were reviewed and   updated as appropriate: allergies, current medications, past family history, past medical history, past social history, past surgical history and problem list.    Compared to one year ago, the patient feels her physical   health is the same.    Compared to one year ago, the patient feels her mental   health is better.    Recent Hospitalizations:  She was not admitted to the hospital during the last year.       Current Medical Providers:  Patient Care Team:  Mega Esparza MD as PCP - General (Family Medicine)  Micaela English MD as Consulting Physician (Gastroenterology)  Mary Grace Culp MD as Consulting Physician (Cardiology)  Jp Krause Jr., MD as Consulting Physician (Hematology and Oncology)  Yasmeen Pichardo RPH as Pharmacist  Micaela English MD as Referring Physician (Gastroenterology)  Devon Valadez MD as Consulting Physician (Hematology and Oncology)  Rusty Interiano, JamarD as Pharmacist (Pharmacy)  Catie Jules PA as Physician Assistant (Gastroenterology)  Haylye Ritter, CORRINE as Ambulatory  ([facility])    Outpatient Medications Prior to Visit   Medication Sig Dispense Refill   • acetaminophen (TYLENOL) 500 MG tablet Take 1 tablet by mouth Every 6 (Six) Hours As Needed for Mild Pain .     • albuterol sulfate  (90 Base) MCG/ACT inhaler Inhale 2 puffs Every 4 (Four) Hours As Needed for Wheezing (or cough). 18 g 0   • ELDERBERRY PO Take 2 tablets by mouth Daily.     • estradiol (ESTRACE VAGINAL) 0.1 MG/GM vaginal cream Insert 2 g into the vagina Daily. 42.5 g 3   • furosemide (LASIX) 20 MG tablet Take 1 tablet by mouth Daily As Needed.     • Magnesium Oxide 400 (240 Mg) MG tablet TAKE 1 TABLET EVERY DAY 90 tablet 1    • mesalamine (APRISO) 0.375 g 24 hr capsule TAKE 4 CAPSULES EVERY  capsule 3   • pantoprazole (PROTONIX) 40 MG EC tablet TAKE 1 TABLET TWICE DAILY 180 tablet 1   • potassium chloride (K-DUR,KLOR-CON) 10 MEQ CR tablet TAKE 1 TABLET EVERY DAY 90 tablet 1   • vitamin B-12 (CYANOCOBALAMIN) 1000 MCG tablet TAKE 1 TABLET EVERY DAY 90 tablet 1   • phenazopyridine (Pyridium) 200 MG tablet Take 1 tablet by mouth 3 (Three) Times a Day As Needed for Bladder Spasms. (Patient not taking: Reported on 3/24/2023) 30 tablet 0     No facility-administered medications prior to visit.       No opioid medication identified on active medication list. I have reviewed chart for other potential  high risk medication/s and harmful drug interactions in the elderly.          Aspirin is not on active medication list.  Aspirin use is not indicated based on review of current medical condition/s. Risk of harm outweighs potential benefits.  .    Patient Active Problem List   Diagnosis   • Sciatica   • Non-toxic multinodular goiter   • Chronic midline low back pain with right-sided sciatica   • Essential hypertension   • Gastroesophageal reflux disease without esophagitis   • Cataract   • Pulmonary hypertension   • Diastolic dysfunction   • HUGO (obstructive sleep apnea)   • Trifascicular block   • Leukopenia   • MGUS (monoclonal gammopathy of unknown significance)   • Ulcerative rectosigmoiditis without complication   • Other chest pain   • Lichen sclerosus   • Heart block   • Sleep-related hypoxia   • Bradycardia   • Shoulder arthritis   • History of atrial fibrillation   • Pacemaker   • Paroxysmal SVT (supraventricular tachycardia)   • History of chest pain   • Palpitations   • Atrial fibrillation   • Rectal bleeding   • Arthritis   • Ascending aortic aneurysm   • Mediastinal lymphadenopathy   • Immobility   • Left knee pain   • Hemarthrosis of left knee   • Anemia   • Obesity (BMI 30-39.9)   • Generalized weakness   • Dysautonomia   •  "Weakness of both lower extremities   • Macrocytosis   • Hypercalcemia   • Arthritis of right wrist   • Hyperparathyroidism   • Choledocholithiasis   • Essential hypertension   • Hyperglycemia   • Epigastric pain   • Duodenal ulcer   • Hemorrhagic gastritis   • Exertional dyspnea   • COPD (chronic obstructive pulmonary disease) ([facility])   • Functional quadriplegia   • Hip pain   • Osteoarthritis of glenohumeral joint   • Other malaise and fatigue   • Shoulder pain   • ICH (intracerebral hemorrhage)   • Melena   • GI bleed   • Lower GI bleed   • Thrombocytopenia   • Lichen sclerosus of female genitalia   • Senile atrophic vaginitis     Advance Care Planning   Advance Care Planning     Advance Directive is on file.  ACP discussion was held with the patient during this visit. Patient has an advance directive in EMR which is still valid.      Objective    Vitals:    23 0959   BP: 126/68   Pulse: 65   Temp: 96.9 °F (36.1 °C)   TempSrc: Temporal   SpO2: 98%   Weight: 86 kg (189 lb 9.6 oz)   Height: 162.6 cm (64\")     Estimated body mass index is 32.54 kg/m² as calculated from the following:    Height as of this encounter: 162.6 cm (64\").    Weight as of this encounter: 86 kg (189 lb 9.6 oz).    BMI is >= 30 and <35. (Class 1 Obesity). The following options were offered after discussion;: none (medical contraindication)      Does the patient have evidence of cognitive impairment? No          HEALTH RISK ASSESSMENT    Smoking Status:  Social History     Tobacco Use   Smoking Status Former   • Packs/day: 1.50   • Years: 10.00   • Pack years: 15.00   • Types: Cigarettes   • Start date:    • Quit date:    • Years since quittin.3   Smokeless Tobacco Never   Tobacco Comments    QUIT SMOKING      Alcohol Consumption:  Social History     Substance and Sexual Activity   Alcohol Use No     Fall Risk Screen:    STEADI Fall Risk Assessment was completed, and patient is at LOW risk for falls.Assessment completed " on:2023    Depression Screening:       View : No data to display.                Health Habits and Functional and Cognitive Screenin/21/2023     9:56 AM   Functional & Cognitive Status   Do you have difficulty preparing food and eating? No   Do you have difficulty bathing yourself, getting dressed or grooming yourself? No   Do you have difficulty using the toilet? No   Do you have difficulty moving around from place to place? No   Do you have trouble with steps or getting out of a bed or a chair? Yes   Current Diet Well Balanced Diet        Current Diet Comment low sodium   Dental Exam Up to date   Eye Exam Up to date   Exercise (times per week) 7 times per week   Current Exercises Include Walking   Do you need help using the phone?  No   Are you deaf or do you have serious difficulty hearing?  Yes   Do you need help with transportation? Yes   Do you need help shopping? Yes   Do you need help preparing meals?  No   Do you need help with housework?  No   Do you need help with laundry? No   Do you need help taking your medications? No   Do you need help managing money? No   Do you ever drive or ride in a car without wearing a seat belt? No   Have you felt unusual stress, anger or loneliness in the last month? No   Who do you live with? Child   If you need help, do you have trouble finding someone available to you? Yes   Have you been bothered in the last four weeks by sexual problems? No   Do you have difficulty concentrating, remembering or making decisions? No       Age-appropriate Screening Schedule:  Refer to the list below for future screening recommendations based on patient's age, sex and/or medical conditions. Orders for these recommended tests are listed in the plan section. The patient has been provided with a written plan.    Health Maintenance   Topic Date Due   • COVID-19 Vaccine (1) Never done   • MAMMOGRAM  2021   • DXA SCAN  2021   • TDAP/TD VACCINES (2 - Td or Tdap)  01/01/2022   • ANNUAL WELLNESS VISIT  01/31/2023   • INFLUENZA VACCINE  08/01/2023   • COLORECTAL CANCER SCREENING  10/28/2029   • Pneumococcal Vaccine 65+  Completed   • ZOSTER VACCINE  Discontinued                  CMS Preventative Services Quick Reference  Risk Factors Identified During Encounter  Fall Risk-High or Moderate: Discussed Fall Prevention in the home   Refusing COVID vaccination.  Prevnar 20 today.    The above risks/problems have been discussed with the patient.  Pertinent information has been shared with the patient in the After Visit Summary.  An After Visit Summary and PPPS were made available to the patient.    Follow Up:   Next Medicare Wellness visit to be scheduled in 1 year.       Additional E&M Note during same encounter follows:  Patient has multiple medical problems which are significant and separately identifiable that require additional work above and beyond the Medicare Wellness Visit.      Chief Complaint  Medicare Wellness-subsequent and Hospital Follow Up Visit (Chest pain )    Subjective        HPI  Brittany Villeda is also being seen today for follow-up ER visit for a brief episode of severe left-sided chest pain under her left breast.  Felt like a jabbing or sharp pain.  By the time she got to the emergency room the pain was better.  Her D-dimer was slightly elevated.  However a CT angiography of the chest revealed no pulmonary embolism.  Chest x-ray and the CT showed no evidence of pulmonary edema.    Highly sensitive troponins were minimally elevated.  However this was not thought to be related to acute coronary syndrome.    She has had no chest discomfort since.  She is having no shortness of breath.  She has ongoing intermittent ankle swelling relieved with furosemide maybe once a week.  She takes potassium daily.  She is not having dyspnea with exertion.  She is not having orthopnea or paroxysmal nocturnal dyspnea.  And she otherwise feels well.  Her mood is improved.  She  "is ambulating more.  And no longer have any urinary symptoms.         Objective   Vital Signs:  /68   Pulse 65   Temp 96.9 °F (36.1 °C) (Temporal)   Ht 162.6 cm (64\")   Wt 86 kg (189 lb 9.6 oz)   SpO2 98%   BMI 32.54 kg/m²     Physical Exam  Vitals and nursing note reviewed.   Constitutional:       General: She is not in acute distress.     Appearance: She is well-developed.   Cardiovascular:      Rate and Rhythm: Normal rate and regular rhythm.      Heart sounds: Normal heart sounds.   Pulmonary:      Effort: Pulmonary effort is normal.      Breath sounds: Normal breath sounds.   Musculoskeletal:      Cervical back: Normal range of motion.      Right lower leg: Edema present.      Left lower leg: Edema present.   Skin:     General: Skin is warm and dry.   Psychiatric:         Mood and Affect: Mood normal.                         Assessment and Plan   Diagnoses and all orders for this visit:    1. Medicare annual wellness visit, subsequent (Primary)    2. Other chest pain    Other orders  -     Pneumococcal Conjugate Vaccine 20-Valent All      Annual Medicare wellness visit.    Follow-up ER visit for chest pain.  Thought to be musculoskeletal.  No evidence of pulmonary embolism.  No evidence of pulmonary edema.  Likely not acute coronary syndrome.  She has not had recurrent pain.  He is having no exertional discomfort or trouble breathing.  She does have ongoing dependent edema.  Could be partially exacerbated by her known right heart failure, but she is not having dyspnea.  No definitive ascites.  And the ankle swelling is often relieved with compression stockings.  She takes the furosemide as needed.  Continue the potassium.  I will see her back within 6 months for recheck.         Follow Up   No follow-ups on file.  Patient was given instructions and counseling regarding her condition or for health maintenance advice. Please see specific information pulled into the AVS if appropriate.         "

## 2023-07-25 ENCOUNTER — TELEPHONE (OUTPATIENT)
Dept: CARDIOLOGY | Facility: CLINIC | Age: 88
End: 2023-07-25
Payer: MEDICARE

## 2023-07-25 ENCOUNTER — OFFICE VISIT (OUTPATIENT)
Dept: FAMILY MEDICINE CLINIC | Facility: CLINIC | Age: 88
End: 2023-07-25
Payer: MEDICARE

## 2023-07-25 VITALS
OXYGEN SATURATION: 99 % | SYSTOLIC BLOOD PRESSURE: 128 MMHG | WEIGHT: 174.2 LBS | HEIGHT: 64 IN | BODY MASS INDEX: 29.74 KG/M2 | DIASTOLIC BLOOD PRESSURE: 68 MMHG | TEMPERATURE: 97.5 F | HEART RATE: 60 BPM

## 2023-07-25 DIAGNOSIS — K40.91 UNILATERAL RECURRENT INGUINAL HERNIA WITHOUT OBSTRUCTION OR GANGRENE: Primary | ICD-10-CM

## 2023-07-25 PROBLEM — K92.2 GI BLEED: Status: RESOLVED | Noted: 2022-11-01 | Resolved: 2023-07-25

## 2023-07-25 PROBLEM — M25.559 HIP PAIN: Status: RESOLVED | Noted: 2018-04-12 | Resolved: 2023-07-25

## 2023-07-25 PROBLEM — R53.2 FUNCTIONAL QUADRIPLEGIA: Status: RESOLVED | Noted: 2020-11-20 | Resolved: 2023-07-25

## 2023-07-25 PROBLEM — M25.519 SHOULDER PAIN: Status: RESOLVED | Noted: 2018-04-12 | Resolved: 2023-07-25

## 2023-07-25 PROBLEM — R53.83 OTHER MALAISE AND FATIGUE: Status: RESOLVED | Noted: 2021-05-25 | Resolved: 2023-07-25

## 2023-07-25 PROBLEM — R10.13 EPIGASTRIC PAIN: Status: RESOLVED | Noted: 2021-05-12 | Resolved: 2023-07-25

## 2023-07-25 PROBLEM — M19.90 ARTHRITIS: Status: RESOLVED | Noted: 2019-12-13 | Resolved: 2023-07-25

## 2023-07-25 PROBLEM — K62.5 RECTAL BLEEDING: Status: RESOLVED | Noted: 2019-10-15 | Resolved: 2023-07-25

## 2023-07-25 PROBLEM — M25.562 LEFT KNEE PAIN: Status: RESOLVED | Noted: 2020-11-20 | Resolved: 2023-07-25

## 2023-07-25 PROBLEM — I10 ESSENTIAL HYPERTENSION: Status: RESOLVED | Noted: 2021-05-10 | Resolved: 2023-07-25

## 2023-07-25 PROBLEM — Z74.09 IMMOBILITY: Status: RESOLVED | Noted: 2020-11-20 | Resolved: 2023-07-25

## 2023-07-25 PROBLEM — D75.89 MACROCYTOSIS: Status: RESOLVED | Noted: 2021-04-11 | Resolved: 2023-07-25

## 2023-07-25 PROBLEM — M19.019 SHOULDER ARTHRITIS: Status: RESOLVED | Noted: 2019-01-28 | Resolved: 2023-07-25

## 2023-07-25 PROBLEM — M19.031 ARTHRITIS OF RIGHT WRIST: Status: RESOLVED | Noted: 2021-04-13 | Resolved: 2023-07-25

## 2023-07-25 PROBLEM — R73.9 HYPERGLYCEMIA: Status: RESOLVED | Noted: 2021-05-10 | Resolved: 2023-07-25

## 2023-07-25 PROBLEM — K92.1 MELENA: Status: RESOLVED | Noted: 2022-09-29 | Resolved: 2023-07-25

## 2023-07-25 PROBLEM — R29.898 WEAKNESS OF BOTH LOWER EXTREMITIES: Status: RESOLVED | Noted: 2021-04-09 | Resolved: 2023-07-25

## 2023-07-25 PROBLEM — R07.89 OTHER CHEST PAIN: Status: RESOLVED | Noted: 2017-12-24 | Resolved: 2023-07-25

## 2023-07-25 PROBLEM — R53.81 OTHER MALAISE AND FATIGUE: Status: RESOLVED | Noted: 2021-05-25 | Resolved: 2023-07-25

## 2023-07-25 PROBLEM — R53.1 GENERALIZED WEAKNESS: Status: RESOLVED | Noted: 2021-04-08 | Resolved: 2023-07-25

## 2023-07-25 PROBLEM — M25.062 HEMARTHROSIS OF LEFT KNEE: Status: RESOLVED | Noted: 2020-11-20 | Resolved: 2023-07-25

## 2023-07-25 PROCEDURE — 99213 OFFICE O/P EST LOW 20 MIN: CPT | Performed by: FAMILY MEDICINE

## 2023-07-25 NOTE — PROGRESS NOTES
"Chief Complaint  Abdominal Pain (LOWER ABDOMIN PAIN NEAR HERNIA X COUPLE WEEKS )    Subjective        Brittany Villeda presents to [facility]  History of Present Illness    Known left inguinal hernia.  Saw the surgeon last fall.  They are considering surgery.  I had recommended watching.  Given her multimedical problems including right heart failure.  However over the last 2-week she has had significant left lower abdominal pain at the site of the inguinal hernia.  She states it is popped out of bulging and was very uncomfortable.  A little better the last couple days.  No vomiting.  No fever.  No changes in bowel movements.  No constipation.    Objective   Vital Signs:  /68   Pulse 60   Temp 97.5 °F (36.4 °C) (Temporal)   Ht 162.6 cm (64\")   Wt 79 kg (174 lb 3.2 oz)   SpO2 99%   BMI 29.90 kg/m²   Estimated body mass index is 29.9 kg/m² as calculated from the following:    Height as of this encounter: 162.6 cm (64\").    Weight as of this encounter: 79 kg (174 lb 3.2 oz).             Physical Exam  Constitutional:       Comments: She appears slightly uncomfortable with movements but otherwise no acute distress.   Cardiovascular:      Rate and Rhythm: Normal rate.   Pulmonary:      Effort: Pulmonary effort is normal.   Abdominal:      Comments: Abdomen is soft with exception of a pronounced left inguinal hernia that is tender to palpation.  Slight erythema on the skin.  I was able to gently reduce the hernia manually without much discomfort.  She felt better with the hernia reduced.  Remainder the exam is unremarkable.      Result Review :                   Assessment and Plan   Diagnoses and all orders for this visit:    1. Unilateral recurrent inguinal hernia without obstruction or gangrene (Primary)      Unilateral left inguinal hernia.  Particularly symptomatic the last 2 weeks.  Better today.  My concern is ongoing recurrent incarceration.  And risk of strangulation.  " At this point the benefits of the surgery may very well outweigh the risks.  I recommended she get back to her surgeon and also her cardiologist for preoperative cardiac evaluation, if surgery indicated.  The patient and her daughter were advised to get her to the emergency room with severe incapacitating hernia pain.  Or other severe concerns.  I will see her otherwise as scheduled.         Follow Up   No follow-ups on file.  Patient was given instructions and counseling regarding her condition or for health maintenance advice. Please see specific information pulled into the AVS if appropriate.

## 2023-07-25 NOTE — TELEPHONE ENCOUNTER
Pt is in need of hernia surgery and needs to see me for clearance.She is currently scheduled to see me in September.  Please see if she can move this appointment to see me in the next 2 weeks.  She is seeing nurse practitioners 3 prior times and so does need to see me.

## 2023-07-26 ENCOUNTER — OFFICE VISIT (OUTPATIENT)
Dept: SURGERY | Facility: CLINIC | Age: 88
End: 2023-07-26
Payer: MEDICARE

## 2023-07-26 VITALS
BODY MASS INDEX: 30.05 KG/M2 | DIASTOLIC BLOOD PRESSURE: 68 MMHG | WEIGHT: 176 LBS | HEIGHT: 64 IN | SYSTOLIC BLOOD PRESSURE: 130 MMHG

## 2023-07-26 DIAGNOSIS — K40.90 LEFT INGUINAL HERNIA: Primary | ICD-10-CM

## 2023-07-26 NOTE — PROGRESS NOTES
ASSESSMENT/PLAN:    This is an 88-year-old lady returning to the office with worsening symptoms of her moderate size left inguinal hernia.  She is ready to have this repaired and I again reiterated this would be an open left inguinal hernia repair under a monitored anesthesia.  She is scheduled to meet with Dr. Culp in early August for cardiac clearance and we will schedule with her daughter after we have received this.  I have placed orders for surgery in anticipation of this.  She understands the nature of the procedure and the risks including but not limited to bleeding, infection, use of mesh, and recurrence.  All questions were answered at the time of this visit and they were willing to proceed with all recommendations.    CC:     Left inguinal hernia    HPI:    This is an 88-year-old lady returning to the office today after last having seen me in November 2022 for her moderate size left inguinal hernia.  At that time I had discussed proceeding with an open left inguinal hernia repair under a monitored anesthesia and recommended obtaining cardiac clearance from Dr. Culp prior to.  At that time severe enough and that the risks of surgery were too great.  Over the last several weeks she states that her hernia has increased in size and has become more symptomatic, causing pain in her left groin more frequently.  She recently was seen by Dr. Esparza who reduced her hernia but was much more concerned about its size and symptoms.  He contacted our office as well as Dr. Culp to initiate the process of moving forward with surgery and cardiac clearance.  She continues to deny nausea, vomiting, abdominal distention, difficulty with bowel movements or any additional complaints.    ENDOSCOPY:   Colonoscopy: 2019 normal per patient  Sigmoidoscopy: 2022 colitis  EGD: 2022 esophagitis    SOCIAL HISTORY:   Denies tobacco use  Denies alcohol use    FAMILY HISTORY:    Colorectal cancer: None    PREVIOUS ABDOMINAL SURGERY     Hysterectomy  Umbilical hernia repair  Lap bhakti    OTHER SURGERY  Past Surgical History:   Procedure Laterality Date    BACK SURGERY      lumbar fusion    DUSTY HOLE Left 08/08/2021    Procedure: Left-sided dusty holes for evacuation of subdural hematoma;  Surgeon: Gustavo Callaway MD;  Location: [facility] MAIN OR;  Service: Neurosurgery;  Laterality: Left;    CARDIAC ELECTROPHYSIOLOGY PROCEDURE N/A 11/07/2018    Procedure: Pacemaker DC new   [place];  Surgeon: Jesus Granda MD;  Location: [facility] CATH INVASIVE LOCATION;  Service: Cardiology    CHOLECYSTECTOMY      COLONOSCOPY  06/16/2014    colitis, cryptitis,  tics, NBIH, TA w/low grade dysplasia    COLONOSCOPY N/A 10/28/2019    Procedure: COLONOSCOPY WITH COLD AND HOT POLYPECTOMIES;  Surgeon: Micaela English MD;  Location: [facility] ENDOSCOPY;  Service: Gastroenterology    ENDOSCOPY N/A 05/13/2021    Procedure: ESOPHAGOGASTRODUODENOSCOPY with BX;  Surgeon: Stefan Mcfadden MD;  Location: [facility] ENDOSCOPY;  Service: Gastroenterology;  Laterality: N/A;  EPIGASTRIC PAIN  --DUODENAL ULCER, HEMORRHAGIC GASTRITIS, HIATAL HERNIA     ENDOSCOPY N/A 10/11/2022    Procedure: ESOPHAGOGASTRODUODENOSCOPY;  Surgeon: Micaela English MD;  Location: [facility] ENDOSCOPY;  Service: Gastroenterology;  Laterality: N/A;  PRE- MELENA  POST- ESOPHAGITIS    HEMORRHOIDECTOMY      HERNIA REPAIR      umbilical    HYSTERECTOMY      JOINT REPLACEMENT      KNEE ARTHROPLASTY      MYOMECTOMY      PACEMAKER IMPLANTATION      SHOULDER SURGERY      SIGMOIDOSCOPY N/A 11/02/2022    Procedure: SIGMOIDOSCOPY FLEXIBLE WITH COLD BIOPSIES AND ASPIRATE;  Surgeon: Micaela English MD;  Location: [facility] ENDOSCOPY;  Service: Gastroenterology;  Laterality: N/A;  PRE- RECTAL BLEEDING  POST- HEMORRHOIDS, DIVERTICULOSIS, COLITIS    SINUS SURGERY      TOE NAIL AMPUTATION  03/04/2019    TONSILLECTOMY         PAST MEDICAL HISTORY:    Past Medical History:   Diagnosis Date    Acute UTI (urinary tract  infection) 04/08/2021    Anemia     Arrhythmia     Arthritis     Asthma     Bradycardia     Chest pain     Choledocholithiasis 05/09/2021    Colitis     COPD (chronic obstructive pulmonary disease)     Diastolic dysfunction     Essential hypertension 05/12/2016    GERD (gastroesophageal reflux disease)     Heart block     Hernia     Hypertension     Hypertensive heart disease     Hyperthyroidism     Kidney stone     Leukopenia     Low back pain     MGUS (monoclonal gammopathy of unknown significance)     Nephrolithiasis     Obesity     Paroxysmal atrial fibrillation     Peptic ulceration     Persistent atrial fibrillation     PSVT (paroxysmal supraventricular tachycardia)     Pulmonary hypertension     Rectal bleed     Systemic hypertension     Trifascicular block     Ulcerative rectosigmoiditis without complication     Ventricular tachycardia     nonsustained    Vertigo        MEDICATIONS:     Current Outpatient Medications:     acetaminophen (TYLENOL) 500 MG tablet, Take 1 tablet by mouth Every 6 (Six) Hours As Needed for Mild Pain ., Disp: , Rfl:     albuterol sulfate  (90 Base) MCG/ACT inhaler, Inhale 2 puffs Every 4 (Four) Hours As Needed for Wheezing (or cough)., Disp: 18 g, Rfl: 0    ELDERBERRY PO, Take 2 tablets by mouth Daily., Disp: , Rfl:     estradiol (ESTRACE VAGINAL) 0.1 MG/GM vaginal cream, Insert 2 g into the vagina Daily., Disp: 42.5 g, Rfl: 3    furosemide (LASIX) 20 MG tablet, Take 1 tablet by mouth Daily As Needed., Disp: , Rfl:     Magnesium Oxide 400 (240 Mg) MG tablet, TAKE 1 TABLET EVERY DAY, Disp: 90 tablet, Rfl: 1    mesalamine (APRISO) 0.375 g 24 hr capsule, TAKE 4 CAPSULES EVERY DAY, Disp: 360 capsule, Rfl: 3    pantoprazole (PROTONIX) 40 MG EC tablet, TAKE 1 TABLET TWICE DAILY, Disp: 180 tablet, Rfl: 1    potassium chloride (K-DUR,KLOR-CON) 10 MEQ CR tablet, TAKE 1 TABLET EVERY DAY, Disp: 90 tablet, Rfl: 1    psyllium (METAMUCIL) 58.6 % packet, Take 1 packet by mouth Daily., Disp:  ", Rfl:     vitamin B-12 (CYANOCOBALAMIN) 1000 MCG tablet, TAKE 1 TABLET EVERY DAY, Disp: 90 tablet, Rfl: 1    ALLERGIES:   Allergies   Allergen Reactions    Codeine Hallucinations     Tolerates hydromorphone    Amitriptyline Rash    Amoxicillin-Pot Clavulanate Rash    Aspirin Unknown - Low Severity     Patient doesn't know why    Bactrim [Sulfamethoxazole-Trimethoprim] Rash    Carisoprodol-Aspirin-Codeine Palpitations    Iodinated Contrast Media Rash    Latex Rash    Naproxen Rash    Nsaids Unknown - Low Severity     unknown    Soma Compound With Codeine [Carisoprodol-Aspirin-Codeine] Rash    Sulfa Antibiotics Rash    Tramadol Palpitations     heart races        PHYSICAL EXAM:   Constitutional: Well-developed well-nourished, no acute distress  Vital signs:   Height 64\"  Weight 176  BMI 30.2  Neck: Supple, no palpable mass, trachea midline  Gastrointestinal: Soft, nontender  : Moderate size left inguinal hernia easily reducible, nontender  Psychiatric: Alert and oriented ×3, normal affect         Silviano Lopez PA-C    [facility] - General Surgery   [address], [address]  [place] [address]     [address], [address]  [place] [address]     Office: [phone]  Fax: [phone]    "

## 2023-08-04 ENCOUNTER — OFFICE VISIT (OUTPATIENT)
Dept: CARDIOLOGY | Facility: CLINIC | Age: 88
End: 2023-08-04
Payer: MEDICARE

## 2023-08-04 ENCOUNTER — TELEPHONE (OUTPATIENT)
Dept: CARDIOLOGY | Facility: CLINIC | Age: 88
End: 2023-08-04

## 2023-08-04 VITALS
BODY MASS INDEX: 29.53 KG/M2 | HEIGHT: 64 IN | SYSTOLIC BLOOD PRESSURE: 150 MMHG | DIASTOLIC BLOOD PRESSURE: 80 MMHG | WEIGHT: 173 LBS | HEART RATE: 60 BPM

## 2023-08-04 DIAGNOSIS — Z01.810 PREOP CARDIOVASCULAR EXAM: Primary | ICD-10-CM

## 2023-08-04 PROCEDURE — 99214 OFFICE O/P EST MOD 30 MIN: CPT | Performed by: INTERNAL MEDICINE

## 2023-08-04 PROCEDURE — 93000 ELECTROCARDIOGRAM COMPLETE: CPT | Performed by: INTERNAL MEDICINE

## 2023-08-04 NOTE — PROGRESS NOTES
Date of Office Visit: 2023  Encounter Provider: Mary Grace Culp MD  Place of Service: [facility] CARDIOLOGY  Patient Name: Brittany Villeda  :1935    Chief complaint  Paroxysmal atrial fibrillation, thoracic aortic aneurysm    History of Present Illness  The patient is a pleasant, 88-year-old female with a history of hypertension with hypertensive heart disease, obstructive sleep apnea (on BiPAP) obesity, immobility, pulmonary hypertension, history of nonsustained ventricular tachycardia, and obstructive sleep apnea.  She has a history of diastolic dysfunction, nonsustained ventricular tachycardia, atrial fibrillation and bradycardia.  She had a stress perfusion study that was negative in 2019.  She eventually underwent pacemaker placement 2018.  Echo 2021 with ejection fraction 60% with mild RV dysfunction negative saline injection with severe biatrial enlargement and no significant valvular heart disease.  RVSP was 38 mmHg.  Last CT angiogram of the chest was on 2023 when she was in the emergency room for pleuritic chest pain.  Not show pulmonary emboli.  It was a limited evaluation of the aorta.  Previously noted to be 4.1 cm aorta.  Dilated hepatic veins consistent with right-sided heart failure were noted.     Since last visit she has had no chest pain, shortness of breath, palpitations, syncope near syncope.  She is doing stretches.  She is considering hernia repair by Dr. Lopez she states over the past several months she has had left inguinal hernia repair which was reduced but still troublesome for her.  Surgery has been recommended.  She believes she has lost approximately 20 pounds in the past 3 months.  This has been intentional.    Past Medical History:   Diagnosis Date    Acute UTI (urinary tract infection) 2021    Anemia     Arrhythmia     Arthritis     Asthma     Bradycardia     Chest pain     Choledocholithiasis 2021    Colitis     COPD  (chronic obstructive pulmonary disease)     Diastolic dysfunction     Essential hypertension 05/12/2016    GERD (gastroesophageal reflux disease)     Heart block     Hernia     Hypertension     Hypertensive heart disease     Hyperthyroidism     Kidney stone     Leukopenia     Low back pain     MGUS (monoclonal gammopathy of unknown significance)     Nephrolithiasis     Obesity     Paroxysmal atrial fibrillation     Peptic ulceration     Persistent atrial fibrillation     PSVT (paroxysmal supraventricular tachycardia)     Pulmonary hypertension     Rectal bleed     Systemic hypertension     Trifascicular block     Ulcerative rectosigmoiditis without complication     Ventricular tachycardia     nonsustained    Vertigo      Past Surgical History:   Procedure Laterality Date    BACK SURGERY      lumbar fusion    DUSTY HOLE Left 08/08/2021    Procedure: Left-sided dusty holes for evacuation of subdural hematoma;  Surgeon: Gustavo Callaway MD;  Location: [facility] MAIN OR;  Service: Neurosurgery;  Laterality: Left;    CARDIAC ELECTROPHYSIOLOGY PROCEDURE N/A 11/07/2018    Procedure: Pacemaker DC new   [place];  Surgeon: Jesus Granda MD;  Location: [facility] CATH INVASIVE LOCATION;  Service: Cardiology    CHOLECYSTECTOMY      COLONOSCOPY  06/16/2014    colitis, cryptitis,  tics, NBIH, TA w/low grade dysplasia    COLONOSCOPY N/A 10/28/2019    Procedure: COLONOSCOPY WITH COLD AND HOT POLYPECTOMIES;  Surgeon: Micaela English MD;  Location: [facility] ENDOSCOPY;  Service: Gastroenterology    ENDOSCOPY N/A 05/13/2021    Procedure: ESOPHAGOGASTRODUODENOSCOPY with BX;  Surgeon: Stefan Mcfadden MD;  Location: [facility] ENDOSCOPY;  Service: Gastroenterology;  Laterality: N/A;  EPIGASTRIC PAIN  --DUODENAL ULCER, HEMORRHAGIC GASTRITIS, HIATAL HERNIA     ENDOSCOPY N/A 10/11/2022    Procedure: ESOPHAGOGASTRODUODENOSCOPY;  Surgeon: Micaela English MD;  Location: [facility] ENDOSCOPY;  Service: Gastroenterology;  Laterality: N/A;  PRE-  MELENA  POST- ESOPHAGITIS    HEMORRHOIDECTOMY      HERNIA REPAIR      umbilical    HYSTERECTOMY      JOINT REPLACEMENT      KNEE ARTHROPLASTY      MYOMECTOMY      PACEMAKER IMPLANTATION      SHOULDER SURGERY      SIGMOIDOSCOPY N/A 11/02/2022    Procedure: SIGMOIDOSCOPY FLEXIBLE WITH COLD BIOPSIES AND ASPIRATE;  Surgeon: Micaela English MD;  Location: [facility] ENDOSCOPY;  Service: Gastroenterology;  Laterality: N/A;  PRE- RECTAL BLEEDING  POST- HEMORRHOIDS, DIVERTICULOSIS, COLITIS    SINUS SURGERY      TOE NAIL AMPUTATION  03/04/2019    TONSILLECTOMY       Outpatient Medications Prior to Visit   Medication Sig Dispense Refill    acetaminophen (TYLENOL) 500 MG tablet Take 1 tablet by mouth Every 6 (Six) Hours As Needed for Mild Pain .      albuterol sulfate  (90 Base) MCG/ACT inhaler Inhale 2 puffs Every 4 (Four) Hours As Needed for Wheezing (or cough). 18 g 0    ELDERBERRY PO Take 2 tablets by mouth Daily.      furosemide (LASIX) 20 MG tablet Take 1 tablet by mouth Daily As Needed.      Magnesium Oxide 400 (240 Mg) MG tablet TAKE 1 TABLET EVERY DAY 90 tablet 1    mesalamine (APRISO) 0.375 g 24 hr capsule TAKE 4 CAPSULES EVERY  capsule 3    pantoprazole (PROTONIX) 40 MG EC tablet TAKE 1 TABLET TWICE DAILY 180 tablet 1    potassium chloride (K-DUR,KLOR-CON) 10 MEQ CR tablet TAKE 1 TABLET EVERY DAY 90 tablet 1    psyllium (METAMUCIL) 58.6 % packet Take 1 packet by mouth Daily.      vitamin B-12 (CYANOCOBALAMIN) 1000 MCG tablet TAKE 1 TABLET EVERY DAY 90 tablet 1    estradiol (ESTRACE VAGINAL) 0.1 MG/GM vaginal cream Insert 2 g into the vagina Daily. 42.5 g 3     No facility-administered medications prior to visit.       Allergies as of 08/04/2023 - Reviewed 08/04/2023   Allergen Reaction Noted    Codeine Hallucinations 11/30/2017    Amitriptyline Rash 05/12/2016    Amoxicillin-pot clavulanate Rash 05/12/2016    Aspirin Unknown - Low Severity 05/12/2016    Bactrim [sulfamethoxazole-trimethoprim] Rash  2016    Carisoprodol-aspirin-codeine Palpitations 2016    Iodinated contrast media Rash 2016    Latex Rash 2016    Naproxen Rash 2016    Nsaids Unknown - Low Severity 2016    Soma compound with codeine [carisoprodol-aspirin-codeine] Rash 2016    Sulfa antibiotics Rash 2016    Tramadol Palpitations 2019     Social History     Socioeconomic History    Marital status:     Number of children: 10    Years of education: High School   Tobacco Use    Smoking status: Former     Packs/day: 1.50     Years: 10.00     Pack years: 15.00     Types: Cigarettes     Start date:      Quit date:      Years since quittin.6    Smokeless tobacco: Never    Tobacco comments:     QUIT SMOKING    Vaping Use    Vaping Use: Never used   Substance and Sexual Activity    Alcohol use: No     Comment: caffeine - decaf coffee    Drug use: Never    Sexual activity: Defer     Family History   Problem Relation Age of Onset    Diabetes Mother     Breast cancer Sister     Kidney cancer Sister     Heart disease Sister     Prostate cancer Brother     Prostate cancer Brother     Prostate cancer Brother     Malig Hyperthermia Neg Hx      Review of Systems   Constitutional: Negative for chills, fever, weight gain and weight loss.   Cardiovascular:  Negative for leg swelling.   Respiratory:  Negative for cough, snoring and wheezing.    Hematologic/Lymphatic: Negative for bleeding problem. Does not bruise/bleed easily.   Skin:  Negative for color change.   Musculoskeletal:  Negative for falls, joint pain and myalgias.   Gastrointestinal:  Positive for abdominal pain. Negative for melena.   Genitourinary:  Negative for hematuria.   Neurological:  Negative for excessive daytime sleepiness.   Psychiatric/Behavioral:  Negative for depression. The patient is not nervous/anxious.       Objective:     Vitals:    23 0924   BP: 150/80   BP Location: Left arm   Patient Position: Sitting  "  Pulse: 60   Weight: 78.5 kg (173 lb)   Height: 162.6 cm (64\")     Body mass index is 29.7 kg/mý.    Vitals reviewed.   Constitutional:       Appearance: Well-developed.   Eyes:      General: No scleral icterus.        Right eye: No discharge.      Conjunctiva/sclera: Conjunctivae normal.      Pupils: Pupils are equal, round, and reactive to light.   HENT:      Head: Normocephalic.      Nose: Nose normal.   Neck:      Thyroid: No thyromegaly.      Vascular: No JVD.   Pulmonary:      Effort: Pulmonary effort is normal. No respiratory distress.      Breath sounds: Normal breath sounds. No wheezing. No rales.   Cardiovascular:      Normal rate. Regular rhythm. Normal S1. Normal S2.       Murmurs: There is no murmur.      No gallop.    Pulses:     Intact distal pulses.      Carotid: 2+ bilaterally.     Radial: 2+ bilaterally.     Femoral: 2+ bilaterally.     Popliteal: 2+ bilaterally.     Dorsalis pedis: 2+ bilaterally.     Posterior tibial: 2+ bilaterally.  Edema:     Peripheral edema absent.   Abdominal:      General: Bowel sounds are normal. There is no distension.      Palpations: Abdomen is soft.      Tenderness: There is no abdominal tenderness. There is no rebound.   Musculoskeletal: Normal range of motion.         General: No tenderness.      Cervical back: Normal range of motion and neck supple. Skin:     General: Skin is warm and dry.      Findings: No erythema or rash.   Neurological:      Mental Status: Alert and oriented to person, place, and time.   Psychiatric:         Behavior: Behavior normal.         Thought Content: Thought content normal.         Judgment: Judgment normal.     Lab Review:   Lab Results - Last 18 Months   Lab Units 04/15/23  1818 03/13/23  1130 11/30/22  1610 11/14/22  0948 11/02/22  0610 11/02/22  0233 09/26/22  1159 06/28/22  1451   WBC 10*3/mm3 2.86* 3.66  --  6.11  --  3.24*   < > 4.1   RBC 10*6/mm3 3.50* 3.79  --  4.16  --  3.54*   < > 3.98   HEMOGLOBIN g/dL 11.6* 12.2   < > " 13.7   < > 12.0   < > 12.2   HEMATOCRIT % 34.1 36.5   < > 41.7  --  35.3   < > 37.5   MCV fL 97.4* 96.3  --  100.2*  --  99.7*   < > 94   MCH pg 33.1* 32.2  --  32.9  --  33.9*   < > 30.7   MCHC g/dL 34.0 33.4  --  32.9  --  34.0   < > 32.5   RDW % 13.2 12.3  --  12.8  --  12.8   < > 14.1   PLATELETS 10*3/mm3 106* 126*  --  184  --  113*   < > 132*   NEUTROPHIL % %  --  39.0*  --   --   --  37.3*   < > 25   LYMPHOCYTE % %  --  52.5*  --   --   --  42.3   < > 61   MONOCYTES % %  --  6.6  --   --   --  17.3*   < > 11   EOSINOPHIL % %  --  1.1  --  1.0  --  2.2   < > 2   BASOPHIL % %  --  0.5  --   --   --  0.6   < > 1   NEUTROS ABS 10*3/mm3 1.04* 1.43*  --  2.43  --  1.21*   < > 1.0*   LYMPHS ABS 10*3/mm3  --  1.92  --  3.31*  --  1.37   < > 2.5   MONOS ABS 10*3/mm3  --  0.24  --  0.31  --  0.56   < > 0.4   EOS ABS 10*3/mm3  --  0.04  --   --   --  0.07   < > 0.1   EOSINOPHIL ABS 10*3/mm3  --   --   --  0.06  --   --   --   --    BASOS ABS 10*3/mm3  --  0.02  --   --   --  0.02   < > 0.0   IMM GRAN % %  --  0.3  --   --   --   --   --  0   IMMATURE GRANS (ABS) 10*3/mm3  --  0.01  --   --   --   --   --  0.0   RDW-SD fl 46.5  --   --   --   --  46.7   < >  --    MPV fL 9.3  --   --   --   --  9.6   < >  --     < > = values in this interval not displayed.     Lab Results - Last 18 Months   Lab Units 04/15/23  1818 03/13/23  1130 11/02/22  0233   GLUCOSE mg/dL 103* 99 99   BUN mg/dL 15 13 11   CREATININE mg/dL 0.81 0.98 0.76   SODIUM mmol/L 141 141 138   POTASSIUM mmol/L 4.5 4.9 4.8   CHLORIDE mmol/L 107 107 105   CO2 mmol/L 27.0 27.4 25.0   CALCIUM mg/dL 10.2 11.1* 10.1   BUN / CREAT RATIO  18.5 13.3 14.5   ANION GAP mmol/L 7.0  --  8.0   EGFR mL/min/1.73 69.9  --  75.9     Lab Results - Last 18 Months   Lab Units 04/15/23  1818 03/13/23  1130 11/02/22  0233 11/01/22  2032   GLUCOSE mg/dL 103* 99 99 85   BUN mg/dL 15 13 11 14   CREATININE mg/dL 0.81 0.98 0.76 0.74   SODIUM mmol/L 141 141 138 139   POTASSIUM mmol/L 4.5  4.9 4.8 4.3   CHLORIDE mmol/L 107 107 105 106   CO2 mmol/L 27.0 27.4 25.0 25.9   CALCIUM mg/dL 10.2 11.1* 10.1 10.8*   TOTAL PROTEIN g/dL 7.3  --   --  6.8   ALBUMIN g/dL 3.9 4.3  --  3.50   ALT (SGPT) U/L 11 8  --  12   AST (SGOT) U/L 16 9  --  16   ALK PHOS U/L 71 71  --  75   BILIRUBIN mg/dL 1.3* 1.4*  --  1.4*   GLOBULIN gm/dL 3.4  --   --  3.3   A/G RATIO g/dL 1.1  --   --  1.1   BUN / CREAT RATIO  18.5 13.3 14.5 18.9   ANION GAP mmol/L 7.0  --  8.0 7.1   EGFR mL/min/1.73 69.9  --  75.9 78.4       Lab Results - Last 18 Months   Lab Units 04/15/23  2237 04/15/23  1818   HSTROP T ng/L 22* 25*     Lab Results - Last 18 Months   Lab Units 04/25/22  1641   TSH uIU/mL 0.854     Lab Results - Last 18 Months   Lab Units 11/02/22  0233   PROTIME Seconds 15.3*   APTT seconds 38.3*         ECG 12 Lead    Date/Time: 8/4/2023 1:22 AM  Performed by: Mary Grace Culp MD  Authorized by: Mary Grace Culp MD   Comparison: compared with previous ECG   Similar to previous ECG  Rhythm: atrial fibrillation  Pacing: ventricular paced rhythm  Clinical impression: abnormal EKG      Assessment:       Diagnosis Plan   1. Preop cardiovascular exam  Adult Transthoracic Echo Complete W/ Cont if Necessary Per Protocol        Plan:       1.  Persistent atrial fibrillation with reasonable rate control.  She is felt to be poor candidate for anticoagulation due to history of GI bleed and decreased mobility.  2.  Preoperative clearance prior to inguinal hernia repair.  We will place a call to Dr. Lopez to determine urgency of the procedure.  We will repeat the echo to reassess pulmonary pressures and RV function.  If this is unchanged and surgery is felt to be warranted and reasonable to proceed though we will also ask radiology to comment on tracheal mass effect noted on recent CT scan.  Also discussed with Dr. Esparza.  3.  Status post permanent pacemaker placement 2018.  Continue routine device checks  4.  History of diastolic dysfunction.  Clinically  compensated without overt heart failure  5.  History of hypokalemia  6.  Dilated asending aorta.  Ask radiology to comment on aortic size from CT scan on 4/2023  7.  History of RV dysfunction and pulmonary hypertension now with.  Evidence of right-sided heart failure on CT scan earlier this year.  Repeat  echo  8.  Untreated HUGO, untreated  9.  Rectal bleeding with hemorroids.  No recent events  10.  Mediastinal adenopathy.  Followed by Dr. Esparza  11.  Debilitated state.  Slowly ambulating around her home.  12.  Thyromegaly with tracheal mass effect.  As above we will ask radiology to comment and also discuss with Dr. Esparza    Time Spent: I spent 35 minutes caring for Brittany on this date of service. This time includes time spent by me in the following activities: preparing for the visit, reviewing tests, obtaining and/or reviewing a separately obtained history, performing a medically appropriate examination and/or evaluation, counseling and educating the patient/family/caregiver, ordering medications, tests, or procedures, documenting information in the medical record, and independently interpreting results and communicating that information with the patient/family/caregiver.   I spent 1 minutes on the separately reported service of ECG. This time is not included in the time used to support the E/M service also reported today.        Your medication list            Accurate as of August 4, 2023 11:59 PM. If you have any questions, ask your nurse or doctor.                CONTINUE taking these medications        Instructions Last Dose Given Next Dose Due   acetaminophen 500 MG tablet  Commonly known as: TYLENOL      Take 1 tablet by mouth Every 6 (Six) Hours As Needed for Mild Pain .       albuterol sulfate  (90 Base) MCG/ACT inhaler  Commonly known as: PROVENTIL HFA;VENTOLIN HFA;PROAIR HFA      Inhale 2 puffs Every 4 (Four) Hours As Needed for Wheezing (or cough).       ELDERBERRY PO      Take 2 tablets by  mouth Daily.       estradiol 0.1 MG/GM vaginal cream  Commonly known as: ESTRACE VAGINAL      Insert 2 g into the vagina Daily.       furosemide 20 MG tablet  Commonly known as: LASIX      Take 1 tablet by mouth Daily As Needed.       Magnesium Oxide -Mg Supplement 400 (240 Mg) MG tablet      TAKE 1 TABLET EVERY DAY       mesalamine 0.375 g 24 hr capsule  Commonly known as: APRISO      TAKE 4 CAPSULES EVERY DAY       pantoprazole 40 MG EC tablet  Commonly known as: PROTONIX      TAKE 1 TABLET TWICE DAILY       potassium chloride 10 MEQ CR tablet  Commonly known as: K-DUR,KLOR-CON      TAKE 1 TABLET EVERY DAY       psyllium 58.6 % packet  Commonly known as: METAMUCIL      Take 1 packet by mouth Daily.       vitamin B-12 1000 MCG tablet  Commonly known as: CYANOCOBALAMIN      TAKE 1 TABLET EVERY DAY                Patient is no longer taking -.  I corrected the med list to reflect this.  I did not stop these medications.      Dictated utilizing Dragon dictation

## 2023-08-04 NOTE — TELEPHONE ENCOUNTER
Please call Dr Silviano Lopez for me.  Need to find out if hernia repair is elective or urgent to determine whether to do a stress test or not

## 2023-08-06 ENCOUNTER — TELEPHONE (OUTPATIENT)
Dept: CARDIOLOGY | Facility: CLINIC | Age: 88
End: 2023-08-06
Payer: MEDICARE

## 2023-08-06 NOTE — TELEPHONE ENCOUNTER
I talked with Dr. Lopez regarding hernia repair.  He does feel that this will need repair and has been reduced once.  It is not urgent but will likely need to be done in the next several months.      I have reviewed further the CT angiogram of the chest in April 2023 and it was compared to the 1/2021.  The radiologist mentioned thyroid gland was markedly enlarged with tracheal mass effect and mediastinal adenopathy was worse with recommendation to repeat CT in 3 months.  There is no mention of the aortic aneurysm previously noted.    Tania, please ask radiology to comment on aortic aneurysm size this more recent apical study.  Please find out if her thyroid mass is new since 2021.  After I have these results and upcoming echo which should be done next week, can proceed with decision regarding clearance.  We will also need to talk to  regarding tracheal mass effect.

## 2023-08-06 NOTE — TELEPHONE ENCOUNTER
I talked to Dr. Lopez who felt surgery will likely be needed but not urgent at the moment.  Likely will need this next several months.  We will need to assess RV function tracheal mass effect and aortic size as her other task.

## 2023-08-08 NOTE — TELEPHONE ENCOUNTER
Called Radiology and they will have Dr Oleary review and make addendum.      Pt will be having the ECHO on 8/10/2023 (Thursday).    Reminder to watch.

## 2023-08-10 ENCOUNTER — HOSPITAL ENCOUNTER (OUTPATIENT)
Dept: CARDIOLOGY | Facility: HOSPITAL | Age: 88
Discharge: HOME | End: 2023-08-10
Admitting: INTERNAL MEDICINE
Payer: MEDICARE

## 2023-08-10 VITALS
HEART RATE: 55 BPM | DIASTOLIC BLOOD PRESSURE: 72 MMHG | WEIGHT: 173 LBS | BODY MASS INDEX: 29.53 KG/M2 | SYSTOLIC BLOOD PRESSURE: 130 MMHG | OXYGEN SATURATION: 100 % | HEIGHT: 64 IN

## 2023-08-10 DIAGNOSIS — Z01.810 PREOP CARDIOVASCULAR EXAM: ICD-10-CM

## 2023-08-10 PROCEDURE — 93306 TTE W/DOPPLER COMPLETE: CPT

## 2023-08-11 LAB
AORTIC ARCH: 3.9 CM
ASCENDING AORTA: 3.8 CM
BH CV ECHO MEAS - ACS: 2.14 CM
BH CV ECHO MEAS - AO MAX PG: 6.1 MMHG
BH CV ECHO MEAS - AO MEAN PG: 3.5 MMHG
BH CV ECHO MEAS - AO ROOT DIAM: 2.8 CM
BH CV ECHO MEAS - AO V2 MAX: 123.4 CM/SEC
BH CV ECHO MEAS - AO V2 VTI: 24.3 CM
BH CV ECHO MEAS - AVA(I,D): 2.8 CM2
BH CV ECHO MEAS - EDV(CUBED): 85.6 ML
BH CV ECHO MEAS - EDV(MOD-SP2): 48 ML
BH CV ECHO MEAS - EDV(MOD-SP4): 40 ML
BH CV ECHO MEAS - EF(MOD-BP): 59.7 %
BH CV ECHO MEAS - EF(MOD-SP2): 60.4 %
BH CV ECHO MEAS - EF(MOD-SP4): 62.5 %
BH CV ECHO MEAS - ESV(CUBED): 26.6 ML
BH CV ECHO MEAS - ESV(MOD-SP2): 19 ML
BH CV ECHO MEAS - ESV(MOD-SP4): 15 ML
BH CV ECHO MEAS - FS: 32.3 %
BH CV ECHO MEAS - IVS/LVPW: 0.86 CM
BH CV ECHO MEAS - IVSD: 0.88 CM
BH CV ECHO MEAS - LAT PEAK E' VEL: 13.6 CM/SEC
BH CV ECHO MEAS - LV DIASTOLIC VOL/BSA (35-75): 21.7 CM2
BH CV ECHO MEAS - LV MASS(C)D: 138.5 GRAMS
BH CV ECHO MEAS - LV MAX PG: 3.5 MMHG
BH CV ECHO MEAS - LV MEAN PG: 1.83 MMHG
BH CV ECHO MEAS - LV SYSTOLIC VOL/BSA (12-30): 8.2 CM2
BH CV ECHO MEAS - LV V1 MAX: 93.4 CM/SEC
BH CV ECHO MEAS - LV V1 VTI: 18.8 CM
BH CV ECHO MEAS - LVIDD: 4.4 CM
BH CV ECHO MEAS - LVIDS: 3 CM
BH CV ECHO MEAS - LVOT AREA: 3.7 CM2
BH CV ECHO MEAS - LVOT DIAM: 2.16 CM
BH CV ECHO MEAS - LVPWD: 1.03 CM
BH CV ECHO MEAS - MED PEAK E' VEL: 8.2 CM/SEC
BH CV ECHO MEAS - MV DEC SLOPE: 515.6 CM/SEC2
BH CV ECHO MEAS - MV DEC TIME: 0.2 MSEC
BH CV ECHO MEAS - MV E MAX VEL: 61.3 CM/SEC
BH CV ECHO MEAS - MV MAX PG: 3.3 MMHG
BH CV ECHO MEAS - MV MEAN PG: 1.51 MMHG
BH CV ECHO MEAS - MV P1/2T: 47.4 MSEC
BH CV ECHO MEAS - MV V2 VTI: 13.5 CM
BH CV ECHO MEAS - MVA(P1/2T): 4.6 CM2
BH CV ECHO MEAS - MVA(VTI): 5.1 CM2
BH CV ECHO MEAS - PA ACC TIME: 0.1 SEC
BH CV ECHO MEAS - PA V2 MAX: 81.4 CM/SEC
BH CV ECHO MEAS - PULM DIAS VEL: 47.6 CM/SEC
BH CV ECHO MEAS - PULM S/D: 0.5
BH CV ECHO MEAS - PULM SYS VEL: 24 CM/SEC
BH CV ECHO MEAS - QP/QS: 1
BH CV ECHO MEAS - RAP SYSTOLE: 3 MMHG
BH CV ECHO MEAS - RV MAX PG: 3 MMHG
BH CV ECHO MEAS - RV V1 MAX: 86.1 CM/SEC
BH CV ECHO MEAS - RV V1 VTI: 17.8 CM
BH CV ECHO MEAS - RVOT DIAM: 2.22 CM
BH CV ECHO MEAS - RVSP: 38.7 MMHG
BH CV ECHO MEAS - SI(MOD-SP2): 15.8 ML/M2
BH CV ECHO MEAS - SI(MOD-SP4): 13.6 ML/M2
BH CV ECHO MEAS - SUP REN AO DIAM: 1.7 CM
BH CV ECHO MEAS - SV(LVOT): 68.9 ML
BH CV ECHO MEAS - SV(MOD-SP2): 29 ML
BH CV ECHO MEAS - SV(MOD-SP4): 25 ML
BH CV ECHO MEAS - SV(RVOT): 68.9 ML
BH CV ECHO MEAS - TAPSE (>1.6): 2.2 CM
BH CV ECHO MEAS - TR MAX PG: 35.7 MMHG
BH CV ECHO MEAS - TR MAX VEL: 298.6 CM/SEC
BH CV ECHO MEASUREMENTS AVERAGE E/E' RATIO: 5.62
BH CV VAS BP LEFT ARM: NORMAL MMHG
BH CV XLRA - RV BASE: 3.3 CM
BH CV XLRA - RV LENGTH: 5.5 CM
BH CV XLRA - RV MID: 3.4 CM
BH CV XLRA - TDI S': 6.8 CM/SEC
LEFT ATRIUM VOLUME INDEX: 43.6 ML/M2
SINUS: 3.5 CM
STJ: 3.5 CM

## 2023-08-14 ENCOUNTER — TELEPHONE (OUTPATIENT)
Dept: CARDIOLOGY | Facility: CLINIC | Age: 88
End: 2023-08-14
Payer: MEDICARE

## 2023-08-14 NOTE — TELEPHONE ENCOUNTER
Please let patient know that we are waiting to hear back from radiology however echo shows her heart is strong the right side looks better and RV pressures are much improved.  If the aortic aneurysm size is not significantly changed, okay to proceed with surgery.

## 2023-08-14 NOTE — TELEPHONE ENCOUNTER
Reviewed results and recommendations with Brittany Delvalle DELROY Villeda's daughter (ok per verbal HARRIS).  Amina verbalized understanding of results and recommendations and will share information with patient.    Patient will await call regarding aortic aneurysm size/clearance to proceed with surgery.    Thank you,  Elaine BUCHANAN RN  Triage Nurse [facility]   11:21 EDT

## 2023-08-21 ENCOUNTER — APPOINTMENT (OUTPATIENT)
Dept: CT IMAGING | Facility: HOSPITAL | Age: 88
End: 2023-08-21
Payer: MEDICARE

## 2023-08-21 ENCOUNTER — HOSPITAL ENCOUNTER (EMERGENCY)
Facility: HOSPITAL | Age: 88
Discharge: HOME | End: 2023-08-21
Attending: EMERGENCY MEDICINE
Payer: MEDICARE

## 2023-08-21 ENCOUNTER — TELEPHONE (OUTPATIENT)
Dept: FAMILY MEDICINE CLINIC | Facility: CLINIC | Age: 88
End: 2023-08-21
Payer: MEDICARE

## 2023-08-21 VITALS
TEMPERATURE: 97.9 F | DIASTOLIC BLOOD PRESSURE: 68 MMHG | OXYGEN SATURATION: 97 % | HEART RATE: 60 BPM | RESPIRATION RATE: 18 BRPM | SYSTOLIC BLOOD PRESSURE: 140 MMHG | BODY MASS INDEX: 29.53 KG/M2 | HEIGHT: 64 IN | WEIGHT: 173 LBS

## 2023-08-21 DIAGNOSIS — K42.9 UMBILICAL HERNIA WITHOUT OBSTRUCTION AND WITHOUT GANGRENE: ICD-10-CM

## 2023-08-21 DIAGNOSIS — R10.9 LEFT SIDED ABDOMINAL PAIN: Primary | ICD-10-CM

## 2023-08-21 LAB
ALBUMIN SERPL-MCNC: 4 G/DL (ref 3.5–5.2)
ALBUMIN/GLOB SERPL: 1.3 G/DL
ALP SERPL-CCNC: 66 U/L (ref 39–117)
ALT SERPL W P-5'-P-CCNC: 11 U/L (ref 1–33)
ANION GAP SERPL CALCULATED.3IONS-SCNC: 8 MMOL/L (ref 5–15)
AST SERPL-CCNC: 14 U/L (ref 1–32)
BACTERIA UR QL AUTO: ABNORMAL /HPF
BASOPHILS # BLD AUTO: 0.02 10*3/MM3 (ref 0–0.2)
BASOPHILS NFR BLD AUTO: 0.5 % (ref 0–1.5)
BILIRUB SERPL-MCNC: 1.1 MG/DL (ref 0–1.2)
BILIRUB UR QL STRIP: NEGATIVE
BUN SERPL-MCNC: 16 MG/DL (ref 8–23)
BUN/CREAT SERPL: 20 (ref 7–25)
CALCIUM SPEC-SCNC: 10.9 MG/DL (ref 8.6–10.5)
CHLORIDE SERPL-SCNC: 111 MMOL/L (ref 98–107)
CLARITY UR: CLEAR
CO2 SERPL-SCNC: 27 MMOL/L (ref 22–29)
COLOR UR: YELLOW
CREAT SERPL-MCNC: 0.8 MG/DL (ref 0.57–1)
DEPRECATED RDW RBC AUTO: 49 FL (ref 37–54)
EGFRCR SERPLBLD CKD-EPI 2021: 71 ML/MIN/1.73
EOSINOPHIL # BLD AUTO: 0.02 10*3/MM3 (ref 0–0.4)
EOSINOPHIL NFR BLD AUTO: 0.5 % (ref 0.3–6.2)
ERYTHROCYTE [DISTWIDTH] IN BLOOD BY AUTOMATED COUNT: 13.2 % (ref 12.3–15.4)
GLOBULIN UR ELPH-MCNC: 3 GM/DL
GLUCOSE SERPL-MCNC: 102 MG/DL (ref 65–99)
GLUCOSE UR STRIP-MCNC: NEGATIVE MG/DL
HCT VFR BLD AUTO: 39.4 % (ref 34–46.6)
HGB BLD-MCNC: 13.1 G/DL (ref 12–15.9)
HGB UR QL STRIP.AUTO: NEGATIVE
HYALINE CASTS UR QL AUTO: ABNORMAL /LPF
IMM GRANULOCYTES # BLD AUTO: 0 10*3/MM3 (ref 0–0.05)
IMM GRANULOCYTES NFR BLD AUTO: 0 % (ref 0–0.5)
KETONES UR QL STRIP: NEGATIVE
LEUKOCYTE ESTERASE UR QL STRIP.AUTO: ABNORMAL
LIPASE SERPL-CCNC: 20 U/L (ref 13–60)
LYMPHOCYTES # BLD AUTO: 1.65 10*3/MM3 (ref 0.7–3.1)
LYMPHOCYTES NFR BLD AUTO: 43.8 % (ref 19.6–45.3)
MCH RBC QN AUTO: 33.9 PG (ref 26.6–33)
MCHC RBC AUTO-ENTMCNC: 33.2 G/DL (ref 31.5–35.7)
MCV RBC AUTO: 102.1 FL (ref 79–97)
MONOCYTES # BLD AUTO: 0.39 10*3/MM3 (ref 0.1–0.9)
MONOCYTES NFR BLD AUTO: 10.3 % (ref 5–12)
NEUTROPHILS NFR BLD AUTO: 1.69 10*3/MM3 (ref 1.7–7)
NEUTROPHILS NFR BLD AUTO: 44.9 % (ref 42.7–76)
NITRITE UR QL STRIP: NEGATIVE
NRBC BLD AUTO-RTO: 0 /100 WBC (ref 0–0.2)
PH UR STRIP.AUTO: 6.5 [PH] (ref 5–8)
PLATELET # BLD AUTO: 120 10*3/MM3 (ref 140–450)
PMV BLD AUTO: 9 FL (ref 6–12)
POTASSIUM SERPL-SCNC: 5.5 MMOL/L (ref 3.5–5.2)
PROT SERPL-MCNC: 7 G/DL (ref 6–8.5)
PROT UR QL STRIP: NEGATIVE
RBC # BLD AUTO: 3.86 10*6/MM3 (ref 3.77–5.28)
RBC # UR STRIP: ABNORMAL /HPF
REF LAB TEST METHOD: ABNORMAL
SODIUM SERPL-SCNC: 146 MMOL/L (ref 136–145)
SP GR UR STRIP: 1.01 (ref 1–1.03)
SQUAMOUS #/AREA URNS HPF: ABNORMAL /HPF
UROBILINOGEN UR QL STRIP: ABNORMAL
WBC # UR STRIP: ABNORMAL /HPF
WBC NRBC COR # BLD: 3.77 10*3/MM3 (ref 3.4–10.8)

## 2023-08-21 PROCEDURE — 83690 ASSAY OF LIPASE: CPT | Performed by: EMERGENCY MEDICINE

## 2023-08-21 PROCEDURE — 80053 COMPREHEN METABOLIC PANEL: CPT | Performed by: EMERGENCY MEDICINE

## 2023-08-21 PROCEDURE — 81001 URINALYSIS AUTO W/SCOPE: CPT | Performed by: EMERGENCY MEDICINE

## 2023-08-21 PROCEDURE — 96374 THER/PROPH/DIAG INJ IV PUSH: CPT

## 2023-08-21 PROCEDURE — 25010000002 MORPHINE PER 10 MG: Performed by: EMERGENCY MEDICINE

## 2023-08-21 PROCEDURE — 74176 CT ABD & PELVIS W/O CONTRAST: CPT

## 2023-08-21 PROCEDURE — 99284 EMERGENCY DEPT VISIT MOD MDM: CPT

## 2023-08-21 PROCEDURE — 85025 COMPLETE CBC W/AUTO DIFF WBC: CPT | Performed by: EMERGENCY MEDICINE

## 2023-08-21 RX ORDER — SODIUM CHLORIDE 0.9 % (FLUSH) 0.9 %
10 SYRINGE (ML) INJECTION AS NEEDED
Status: DISCONTINUED | OUTPATIENT
Start: 2023-08-21 | End: 2023-08-21 | Stop reason: HOSPADM

## 2023-08-21 RX ORDER — DICYCLOMINE HYDROCHLORIDE 10 MG/1
10 CAPSULE ORAL 3 TIMES DAILY PRN
Qty: 30 CAPSULE | Refills: 0 | Status: SHIPPED | OUTPATIENT
Start: 2023-08-21 | End: 2023-08-28

## 2023-08-21 RX ORDER — MORPHINE SULFATE 2 MG/ML
2 INJECTION, SOLUTION INTRAMUSCULAR; INTRAVENOUS ONCE
Status: COMPLETED | OUTPATIENT
Start: 2023-08-21 | End: 2023-08-21

## 2023-08-21 RX ADMIN — MORPHINE SULFATE 2 MG: 2 INJECTION, SOLUTION INTRAMUSCULAR; INTRAVENOUS at 17:58

## 2023-08-21 RX ADMIN — SODIUM CHLORIDE, POTASSIUM CHLORIDE, SODIUM LACTATE AND CALCIUM CHLORIDE 1000 ML: 600; 310; 30; 20 INJECTION, SOLUTION INTRAVENOUS at 16:18

## 2023-08-21 NOTE — ED NOTES
Pt has umbilical hernia x a couple months.  She has abd pain.  She called pmd for pain meds and was told to come to ER.  She has pitting edema in bilat lower extremities

## 2023-08-21 NOTE — ED PROVIDER NOTES
EMERGENCY DEPARTMENT ENCOUNTER    Room Number:  20/20  PCP: Mega Esparza MD  Historian: Patient, EMS, daughter at bedside      HPI:  Chief Complaint: Abdominal pain  A complete HPI/ROS/PMH/PSH/SH/FH are unobtainable due to: Nothing  Context: Brittany Villeda is a 88 y.o. female who presents to the ED from home by EMS c/o left-sided abdominal pain for the past several months.  Pain is constant but waxing and waning.  Pain has been worse for the past 1 week.  Nothing makes her pain better or worse.  She has a history of a hernia and is scheduled to have hernia repair at the end of September.  She denies fever, chills, vomiting, diarrhea, constipation, dysuria, hematuria, chest pain, or shortness of breath.  Appetite is normal.  She has edema in both legs which is unchanged.  Per EMS, glucose was 109.  She has had a hysterectomy and cholecystectomy.            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    MGUS (monoclonal gammopathy of unknown significance) 09/16/2016    Ulcerative rectosigmoiditis without complication 11/30/2017    Lichen sclerosus 08/23/2017    Heart block 10/30/2018    Sleep-related hypoxia 12/22/2018    Bradycardia 12/29/2018    History of atrial fibrillation 03/19/2019    Pacemaker 03/19/2019    Paroxysmal SVT (supraventricular tachycardia) 05/08/2019    History of chest pain 05/08/2019    Palpitations 05/08/2019    Atrial fibrillation 10/06/2019    Ascending aortic aneurysm 08/24/2020    Mediastinal lymphadenopathy 09/09/2020    Anemia     Obesity (BMI 30-39.9)      Dysautonomia 04/09/2021    Hypercalcemia 04/11/2021    Hyperparathyroidism 04/28/2021    Choledocholithiasis 05/09/2021    Duodenal ulcer 05/13/2021    Hemorrhagic gastritis 05/13/2021    Exertional dyspnea 06/22/2021    COPD (chronic obstructive pulmonary disease)     Osteoarthritis of glenohumeral joint 07/12/2018    ICH (intracerebral hemorrhage) 08/05/2021    Lower GI bleed 11/01/2022    Thrombocytopenia 11/01/2022    Lichen sclerosus of female genitalia 03/13/2023    Senile atrophic vaginitis 03/13/2023     Resolved Ambulatory Problems     Diagnosis Date Noted    Abnormal weight loss 05/12/2016    Tachycardia 05/12/2016    Sciatica 05/12/2016    Nausea and vomiting 05/12/2016    Hyperthyroidism 05/12/2016    Fatigue 05/12/2016    Dizziness 05/12/2016    Diarrhea 05/12/2016    Abnormal thyroid stimulating hormone (TSH) level 05/12/2016    Abdominal pain 05/12/2016    Abnormal EKG 06/30/2016    Leukopenia 09/12/2016    Other chest pain 12/24/2017    Shoulder arthritis 01/28/2019    Rectal bleeding 10/15/2019    Arthritis 12/13/2019    Immobility 11/20/2020    Left knee pain 11/20/2020    Chronic anticoagulation 11/20/2020    Hemarthrosis of left knee 11/20/2020    Generalized weakness 04/08/2021    Acute UTI (urinary tract infection) 04/08/2021    Weakness of both lower extremities 04/09/2021    Spondylosis of lumbosacral region without myelopathy or radiculopathy 04/09/2021    Macrocytosis 04/11/2021    Arthritis of right wrist 04/13/2021    Hypomagnesemia 04/13/2021    Essential hypertension 05/10/2021    Hyperglycemia 05/10/2021    Epigastric pain 05/12/2021    Functional quadriplegia 11/20/2020    Hip pain 04/12/2018    Other malaise and fatigue 05/25/2021    Shoulder pain 04/12/2018    Subdural hematoma 08/05/2021    Melena 09/29/2022    GI bleed 11/01/2022     Past Medical History:   Diagnosis Date    Arrhythmia     Asthma     Chest pain     Colitis     GERD (gastroesophageal reflux disease)     Hernia      Hypertension     Hypertensive heart disease     Kidney stone     Low back pain     Nephrolithiasis     Obesity     Paroxysmal atrial fibrillation     Peptic ulceration     Persistent atrial fibrillation     PSVT (paroxysmal supraventricular tachycardia)     Rectal bleed     Systemic hypertension     Ventricular tachycardia     Vertigo          PAST SURGICAL HISTORY  Past Surgical History:   Procedure Laterality Date    BACK SURGERY      lumbar fusion    DUSTY HOLE Left 08/08/2021    Procedure: Left-sided dusty holes for evacuation of subdural hematoma;  Surgeon: Gustavo Callaway MD;  Location: [facility] MAIN OR;  Service: Neurosurgery;  Laterality: Left;    CARDIAC ELECTROPHYSIOLOGY PROCEDURE N/A 11/07/2018    Procedure: Pacemaker DC new   [place];  Surgeon: Jesus Granda MD;  Location: [facility] CATH INVASIVE LOCATION;  Service: Cardiology    CHOLECYSTECTOMY      COLONOSCOPY  06/16/2014    colitis, cryptitis,  tics, NBIH, TA w/low grade dysplasia    COLONOSCOPY N/A 10/28/2019    Procedure: COLONOSCOPY WITH COLD AND HOT POLYPECTOMIES;  Surgeon: Micaela English MD;  Location: [facility] ENDOSCOPY;  Service: Gastroenterology    ENDOSCOPY N/A 05/13/2021    Procedure: ESOPHAGOGASTRODUODENOSCOPY with BX;  Surgeon: Stefan Mcfadden MD;  Location: [facility] ENDOSCOPY;  Service: Gastroenterology;  Laterality: N/A;  EPIGASTRIC PAIN  --DUODENAL ULCER, HEMORRHAGIC GASTRITIS, HIATAL HERNIA     ENDOSCOPY N/A 10/11/2022    Procedure: ESOPHAGOGASTRODUODENOSCOPY;  Surgeon: Micaela English MD;  Location: [facility] ENDOSCOPY;  Service: Gastroenterology;  Laterality: N/A;  PRE- MELENA  POST- ESOPHAGITIS    HEMORRHOIDECTOMY      HERNIA REPAIR      umbilical    HYSTERECTOMY      JOINT REPLACEMENT      KNEE ARTHROPLASTY      MYOMECTOMY      PACEMAKER IMPLANTATION      SHOULDER SURGERY      SIGMOIDOSCOPY N/A 11/02/2022    Procedure: SIGMOIDOSCOPY FLEXIBLE WITH COLD BIOPSIES AND ASPIRATE;  Surgeon: Micaela English MD;  Location: [facility]  ENDOSCOPY;  Service: Gastroenterology;  Laterality: N/A;  PRE- RECTAL BLEEDING  POST- HEMORRHOIDS, DIVERTICULOSIS, COLITIS    SINUS SURGERY      TOE NAIL AMPUTATION  2019    TONSILLECTOMY           FAMILY HISTORY  Family History   Problem Relation Age of Onset    Diabetes Mother     Breast cancer Sister     Kidney cancer Sister     Heart disease Sister     Prostate cancer Brother     Prostate cancer Brother     Prostate cancer Brother     Malig Hyperthermia Neg Hx          SOCIAL HISTORY  Social History     Socioeconomic History    Marital status:     Number of children: 10    Years of education: High School   Tobacco Use    Smoking status: Former     Packs/day: 1.50     Years: 10.00     Pack years: 15.00     Types: Cigarettes     Start date:      Quit date:      Years since quittin.6    Smokeless tobacco: Never    Tobacco comments:     QUIT SMOKING    Vaping Use    Vaping Use: Never used   Substance and Sexual Activity    Alcohol use: No     Comment: caffeine - decaf coffee    Drug use: Never    Sexual activity: Defer         ALLERGIES  Codeine, Amitriptyline, Amoxicillin-pot clavulanate, Aspirin, Bactrim [sulfamethoxazole-trimethoprim], Carisoprodol-aspirin-codeine, Iodinated contrast media, Latex, Naproxen, Nsaids, Soma compound with codeine [carisoprodol-aspirin-codeine], Sulfa antibiotics, and Tramadol    REVIEW OF SYSTEMS  All systems have been reviewed and are negative except for those listed in the HPI      PHYSICAL EXAM  ED Triage Vitals [23 1358]   Temp Heart Rate Resp BP SpO2   97.9 øF (36.6 øC) 62 18 132/62 99 %      Temp src Heart Rate Source Patient Position BP Location FiO2 (%)   Tympanic Monitor -- -- --       Physical Exam      GENERAL: Awake, alert, oriented x3.  Well-developed, well-nourished elderly female.  Resting comfortably in no acute distress  HENT: NCAT, nares patent, moist mucous membranes  EYES: no scleral icterus  CV: regular rhythm, normal  rate  RESPIRATORY: normal effort, clear to auscultation bilaterally  ABDOMEN: soft, there is left mid abdominal tenderness without rebound or guarding, no CVA tenderness, bowel sounds are normal  MUSCULOSKELETAL: Extremities are nontender with full range of motion.  There is 1+ pedal edema in both lower legs  NEURO: Speech is normal.  No facial droop.  Follows commands  PSYCH:  calm, cooperative  SKIN: warm, dry    Vital signs and nursing notes reviewed.          LAB RESULTS  Recent Results (from the past 24 hour(s))   Urinalysis With Microscopic If Indicated (No Culture) - Urine, Clean Catch    Collection Time: 08/21/23  3:15 PM    Specimen: Urine, Clean Catch   Result Value Ref Range    Color, UA Yellow Yellow, Straw    Appearance, UA Clear Clear    pH, UA 6.5 5.0 - 8.0    Specific Gravity, UA 1.010 1.005 - 1.030    Glucose, UA Negative Negative    Ketones, UA Negative Negative    Bilirubin, UA Negative Negative    Blood, UA Negative Negative    Protein, UA Negative Negative    Leuk Esterase, UA Trace (A) Negative    Nitrite, UA Negative Negative    Urobilinogen, UA 1.0 E.U./dL 0.2 - 1.0 E.U./dL   Urinalysis, Microscopic Only - Urine, Clean Catch    Collection Time: 08/21/23  3:15 PM    Specimen: Urine, Clean Catch   Result Value Ref Range    RBC, UA None Seen None Seen, 0-2 /HPF    WBC, UA 3-5 (A) None Seen, 0-2 /HPF    Bacteria, UA None Seen None Seen /HPF    Squamous Epithelial Cells, UA 3-6 (A) None Seen, 0-2 /HPF    Hyaline Casts, UA 0-2 None Seen /LPF    Methodology Manual Light Microscopy    Comprehensive Metabolic Panel    Collection Time: 08/21/23  4:08 PM    Specimen: Blood   Result Value Ref Range    Glucose 102 (H) 65 - 99 mg/dL    BUN 16 8 - 23 mg/dL    Creatinine 0.80 0.57 - 1.00 mg/dL    Sodium 146 (H) 136 - 145 mmol/L    Potassium 5.5 (H) 3.5 - 5.2 mmol/L    Chloride 111 (H) 98 - 107 mmol/L    CO2 27.0 22.0 - 29.0 mmol/L    Calcium 10.9 (H) 8.6 - 10.5 mg/dL    Total Protein 7.0 6.0 - 8.5 g/dL     Albumin 4.0 3.5 - 5.2 g/dL    ALT (SGPT) 11 1 - 33 U/L    AST (SGOT) 14 1 - 32 U/L    Alkaline Phosphatase 66 39 - 117 U/L    Total Bilirubin 1.1 0.0 - 1.2 mg/dL    Globulin 3.0 gm/dL    A/G Ratio 1.3 g/dL    BUN/Creatinine Ratio 20.0 7.0 - 25.0    Anion Gap 8.0 5.0 - 15.0 mmol/L    eGFR 71.0 >60.0 mL/min/1.73   Lipase    Collection Time: 08/21/23  4:08 PM    Specimen: Blood   Result Value Ref Range    Lipase 20 13 - 60 U/L   CBC Auto Differential    Collection Time: 08/21/23  4:08 PM    Specimen: Blood   Result Value Ref Range    WBC 3.77 3.40 - 10.80 10*3/mm3    RBC 3.86 3.77 - 5.28 10*6/mm3    Hemoglobin 13.1 12.0 - 15.9 g/dL    Hematocrit 39.4 34.0 - 46.6 %    .1 (H) 79.0 - 97.0 fL    MCH 33.9 (H) 26.6 - 33.0 pg    MCHC 33.2 31.5 - 35.7 g/dL    RDW 13.2 12.3 - 15.4 %    RDW-SD 49.0 37.0 - 54.0 fl    MPV 9.0 6.0 - 12.0 fL    Platelets 120 (L) 140 - 450 10*3/mm3    Neutrophil % 44.9 42.7 - 76.0 %    Lymphocyte % 43.8 19.6 - 45.3 %    Monocyte % 10.3 5.0 - 12.0 %    Eosinophil % 0.5 0.3 - 6.2 %    Basophil % 0.5 0.0 - 1.5 %    Immature Grans % 0.0 0.0 - 0.5 %    Neutrophils, Absolute 1.69 (L) 1.70 - 7.00 10*3/mm3    Lymphocytes, Absolute 1.65 0.70 - 3.10 10*3/mm3    Monocytes, Absolute 0.39 0.10 - 0.90 10*3/mm3    Eosinophils, Absolute 0.02 0.00 - 0.40 10*3/mm3    Basophils, Absolute 0.02 0.00 - 0.20 10*3/mm3    Immature Grans, Absolute 0.00 0.00 - 0.05 10*3/mm3    nRBC 0.0 0.0 - 0.2 /100 WBC       Ordered the above labs and reviewed the results.        RADIOLOGY  CT Abdomen Pelvis Without Contrast    Result Date: 8/21/2023  CT OF THE ABDOMEN AND PELVIS WITHOUT CONTRAST 08/21/2023  HISTORY: Left-sided abdominal pain.  Spiral images were obtained from the lung bases to the symphysis pubis. No intravenous or oral contrast was given.  The gallbladder has been removed. The liver and spleen appear unremarkable. Pancreas appears somewhat atrophic. Adrenals are not enlarged. There are 2 right renal cysts including  the largest measuring up to 7.9 cm; 2 or 3 left renal cysts are seen the largest measuring up to 3.1 cm. There are postoperative changes of the lumbar spine as well as some degenerative disease. There is colonic diverticulosis. No bowel wall thickening or bowel dilatation is seen. Moderate amount of air is seen in the rectosigmoid colon. No free air, abscess or free fluid is seen.  Uterus has been removed.      1. Status post cholecystectomy and hysterectomy. 2. Bilateral renal cysts. 3. Colonic diverticulosis. 4. No acute findings are seen to explain patient's reported left abdominal pain.   Radiation dose reduction techniques were utilized, including automated exposure control and exposure modulation based on body size.   This report was finalized on 8/21/2023 4:12 PM by Dr. Gordy Nance M.D.       Ordered the above noted radiological studies. Reviewed by me in PACS.            PROCEDURES  Procedures              MEDICATIONS GIVEN IN ER  Medications   sodium chloride 0.9 % flush 10 mL (has no administration in time range)   lactated ringers bolus 1,000 mL (0 mL Intravenous Stopped 8/21/23 1913)   morphine injection 2 mg (2 mg Intravenous Given 8/21/23 1758)                   MEDICAL DECISION MAKING, PROGRESS, and CONSULTS    All labs have been independently reviewed by me.  All radiology studies have been reviewed by me and I have also reviewed the radiology report.   EKG's independently viewed and interpreted by me.  Discussion below represents my analysis of pertinent findings related to patient's condition, differential diagnosis, treatment plan and final disposition.      Additional sources:  - Discussed/ obtained information from independent historians: EMS, daughter at bedside    - External (non-ED) record review: Patient was last admitted here November 2022 for a lower GI bleed.  Sigmoidoscopy showed proctosigmoid ulcerative colitis.  She was treated with prednisone.  CT abdomen/pelvis done at that  time showed colonic diverticulosis.    - Chronic or social conditions impacting care: N/A          Orders placed during this visit:  Orders Placed This Encounter   Procedures    CT Abdomen Pelvis Without Contrast    Comprehensive Metabolic Panel    Lipase    Urinalysis With Microscopic If Indicated (No Culture) - Urine, Clean Catch    CBC Auto Differential    Urinalysis, Microscopic Only - Urine, Clean Catch    Insert Peripheral IV    CBC & Differential         Additional orders considered but not ordered:  N/A        Differential diagnosis:    Differential diagnosis includes but is not limited to:  - hepatobiliary pathology such as cholecystitis, cholangitis, and symptomatic cholelithiasis  - Pancreatitis  - Dyspepsia  - Small bowel obstruction  - Appendicitis  - Diverticulitis  - UTI including pyelonephritis  - Ureteral stone  - Zoster  - Colitis, including infectious and ischemic  - Atypical ACS        Independent interpretation of labs, radiology studies, and discussions with consultants:  ED Course as of 08/21/23 1939   Mon Aug 21, 2023   1537 Urinalysis is unremarkable. [WH]   1537 CT abdomen/pelvis personally interpreted by me.  My personal interpretation is: Lung bases are clear.  No obstructive uropathy.  No bowel obstruction    Per the radiologist, there are bilateral renal cysts.  There is colonic diverticulosis.  There are no acute findings. [WH]   1646 Sodium(!): 146 [WH]   1646 Potassium(!): 5.5 [WH]   1646 Lipase: 20 [WH]   1646 WBC: 3.77 [WH]   1646 Hemoglobin: 13.1 [WH]   1646 ALT (SGPT): 11 [WH]   1646 AST (SGOT): 14 [WH]   1646 Alkaline Phosphatase: 66 [WH]   1646 Total Bilirubin: 1.1 [WH]   1738 Test results discussed with the patient and her daughter, who is now at bedside.  Patient is still complaining of abdominal pain.  She will be given a dose of IV morphine.  Work-up is basically unremarkable except for mild hyperkalemia. [WH]   1855 Patient is feeling better after IV morphine.  On reexam,  abdomen is soft and without rebound or guarding.  Patient and her daughter were advised to call her general surgeon.  Return precautions were discussed. [WH]   1907 Patient presented to the ED complaining of left-sided abdominal pain for the past few months.  She has a history of an umbilical hernia and is waiting for surgery.  On exam, there was mild left abdominal tenderness but she did not have an acute abdomen.  Labs were basically unremarkable except for mild hyperkalemia.  She takes potassium at home.  Patient was given IV fluids and IV morphine.  Her symptoms improved.  CT abdomen/pelvis was negative acute.  There was no evidence of a bowel obstruction or incarcerated hernia.  She will be discharged with a prescription for Bentyl.  She was advised to stop taking potassium and to have her potassium level rechecked in 1 week. [WH]      ED Course User Index  [WH] Genaro Owen MD               DIAGNOSIS  Final diagnoses:   Left sided abdominal pain   Umbilical hernia without obstruction and without gangrene         DISPOSITION  DISCHARGE    Patient discharged in stable condition.    Reviewed implications of results, diagnosis, meds, responsibility to follow up, warning signs and symptoms of possible worsening, potential complications and reasons to return to ER, including worsening/persistent pain, vomiting, diarrhea, or other concern..    Patient/Family voiced understanding of above instructions.    Discussed plan for discharge, as there is no emergent indication for admission. Patient referred to primary care provider for BP management due to today's BP. Pt/family is agreeable and understands need for follow up and repeat testing.  Pt is aware that discharge does not mean that nothing is wrong but it indicates no emergency is present that requires admission and they must continue care with follow-up as given below or physician of their choice.     FOLLOW-UP  Antonio Foster MD  [address]  [place] [id number] [phone]          Mega Esparza MD  [address]  [address]  [place] [id number]  [phone]               Medication List        New Prescriptions      dicyclomine 10 MG capsule  Commonly known as: BENTYL  Take 1 capsule by mouth 3 (Three) Times a Day As Needed for Abdominal Cramping.            Stop      potassium chloride 10 MEQ CR tablet  Commonly known as: K-DUR,KLOR-CON               Where to Get Your Medications        These medications were sent to [facility] [id number] - [place], [place] - [address] [facility] & ([facility]) - [phone]  - [phone]   [address] [place] [address], [place] [id number]      Phone: [phone]   dicyclomine 10 MG capsule                   Latest Documented Vital Signs:  As of 19:39 EDT  BP- 140/68 HR- 60 Temp- 97.9 øF (36.6 øC) (Tympanic) O2 sat- 97%              --    Please note that portions of this were completed with a voice recognition program.       Note Disclaimer: At [facility], we believe that sharing information builds trust and better relationships. You are receiving this note because you are receiving care at [facility] or recently visited. It is possible you will see health information before a provider has talked with you about it. This kind of information can be easy to misunderstand. To help you fully understand what it means for your health, we urge you to discuss this note with your provider.             Genaro Owen MD  08/21/23 1890

## 2023-08-21 NOTE — TELEPHONE ENCOUNTER
Unable to reach the patients daughter by phone. I reached out to the emergency contact and spoke with her. She is going to let her sister know they need to take her to the ER today.

## 2023-08-21 NOTE — DISCHARGE INSTRUCTIONS
Follow-up with your general surgeon as soon as possible.  Return to the emergency department for worsening pain, vomiting, fever, or other concern.  Take medication as prescribed..  Stop taking your potassium pills.  You will need to have your potassium level rechecked in 1 week.

## 2023-08-22 ENCOUNTER — TELEPHONE (OUTPATIENT)
Dept: SURGERY | Facility: CLINIC | Age: 88
End: 2023-08-22
Payer: MEDICARE

## 2023-08-22 NOTE — TELEPHONE ENCOUNTER
Pt's daughter called pt has been cleared by Dr Duarte for hernia rpr I gave her a date of 9/18 in [place] & 9/26 in [place].  She wasn't happy with either & stated in a lot of pain & also in the ER on 8/21?

## 2023-08-23 ENCOUNTER — TELEPHONE (OUTPATIENT)
Dept: SURGERY | Facility: CLINIC | Age: 88
End: 2023-08-23
Payer: MEDICARE

## 2023-08-23 ENCOUNTER — DOCUMENTATION (OUTPATIENT)
Dept: FAMILY MEDICINE CLINIC | Facility: CLINIC | Age: 88
End: 2023-08-23
Payer: MEDICARE

## 2023-08-23 PROBLEM — K40.90 LEFT INGUINAL HERNIA: Status: ACTIVE | Noted: 2023-08-23

## 2023-08-23 NOTE — TELEPHONE ENCOUNTER
I talked to the daughter  who also reviewed CT scans.  Overall stenosis does not appear to be changed and with need for hernia surgery with worsening pain felt it was reasonable to proceed with surgery and see ENT afterwards.  I contacted Amina patient's daughter regarding this.  She states that she had discussed her thyroid abnormality with Dr. Grier her endocrinologist with problems with plans for intervention and reassessment a year later.  It has not been a year.  She also tells me that Dr. Lopez is not going to be doing her procedure with Dr. Foster is.  I have placed a call to Dr. Foster to discuss a tracheal deviation.  She is otherwise cleared from a cardiac standpoint.    Whit, please contact Dr. Foster's office and alert them of this in case he wishes to do anything different.  Gregg Alvarez, can you please call me about a mutual patient see Dr. Villeda.whom you are to take to the OR on September 1.  Thank you

## 2023-08-23 NOTE — TELEPHONE ENCOUNTER
Aneurysm unchanged if anything slightly smaller.  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  Call placed to Dr. Walter.

## 2023-08-23 NOTE — TELEPHONE ENCOUNTER
Spoke w/ daughter about having her mom's surgery on 9/1.  She's trying to make it work & will call me back

## 2023-08-24 ENCOUNTER — TELEPHONE (OUTPATIENT)
Dept: SURGERY | Facility: CLINIC | Age: 88
End: 2023-08-24
Payer: MEDICARE

## 2023-08-24 NOTE — TELEPHONE ENCOUNTER
Caller: CATALINA    Relationship to patient:  CARDIO STAFF    Best call back number: NO CALL BACK NECESSARY    STAFF is needing: S/W CATALINA @ DR.MINI PAZ OFFICE- PLEASE TELL  THAT  HAS SENT HIM A MESSAGE. THANK YOU

## 2023-08-24 NOTE — TELEPHONE ENCOUNTER
I talked to Dr. Foster and he reviewed films.  He does not believe there is significant compression of the trachea.    Whit, please let patient know and okay to proceed from our standpoint with surgery.

## 2023-08-31 ENCOUNTER — PATIENT OUTREACH (OUTPATIENT)
Dept: CASE MANAGEMENT | Facility: OTHER | Age: 88
End: 2023-08-31
Payer: MEDICARE

## 2023-08-31 NOTE — OUTREACH NOTE
AMBULATORY CASE MANAGEMENT NOTE    Name and Relationship of Patient/Support Person: Brittany Villeda S - Self    Patient Outreach    Introduced self, explained ACM RN role and provided contact information.  Spoke with patient's daughter. She is reportedly doing fine after ED visit. No needs at this time. Surgery to repair hernia is scheduled for 9/1/23. No further outreach scheduled at this time.     Education Documentation  No documentation found.        Ruth GALLO  Ambulatory Case Management    8/31/2023, 16:29 EDT

## 2023-09-01 ENCOUNTER — ANESTHESIA (OUTPATIENT)
Dept: PERIOP | Facility: HOSPITAL | Age: 88
End: 2023-09-01
Payer: MEDICARE

## 2023-09-01 ENCOUNTER — ANESTHESIA EVENT (OUTPATIENT)
Dept: PERIOP | Facility: HOSPITAL | Age: 88
End: 2023-09-01
Payer: MEDICARE

## 2023-09-01 ENCOUNTER — HOSPITAL ENCOUNTER (OUTPATIENT)
Facility: HOSPITAL | Age: 88
Discharge: HOME | End: 2023-09-03
Attending: SURGERY | Admitting: SURGERY
Payer: MEDICARE

## 2023-09-01 DIAGNOSIS — K40.90 LEFT INGUINAL HERNIA: ICD-10-CM

## 2023-09-01 PROCEDURE — 25010000002 ONDANSETRON PER 1 MG: Performed by: NURSE ANESTHETIST, CERTIFIED REGISTERED

## 2023-09-01 PROCEDURE — 49505 PRP I/HERN INIT REDUC >5 YR: CPT | Performed by: SPECIALIST/TECHNOLOGIST, OTHER

## 2023-09-01 PROCEDURE — A9270 NON-COVERED ITEM OR SERVICE: HCPCS | Performed by: SURGERY

## 2023-09-01 PROCEDURE — G0378 HOSPITAL OBSERVATION PER HR: HCPCS

## 2023-09-01 PROCEDURE — A9270 NON-COVERED ITEM OR SERVICE: HCPCS | Performed by: NURSE ANESTHETIST, CERTIFIED REGISTERED

## 2023-09-01 PROCEDURE — 25010000002 CEFAZOLIN IN DEXTROSE 2-4 GM/100ML-% SOLUTION: Performed by: PHYSICIAN ASSISTANT

## 2023-09-01 PROCEDURE — 25010000002 FENTANYL CITRATE (PF) 50 MCG/ML SOLUTION: Performed by: NURSE ANESTHETIST, CERTIFIED REGISTERED

## 2023-09-01 PROCEDURE — 49505 PRP I/HERN INIT REDUC >5 YR: CPT | Performed by: SURGERY

## 2023-09-01 PROCEDURE — 63710000001 HYDROCODONE-ACETAMINOPHEN 5-325 MG TABLET: Performed by: SURGERY

## 2023-09-01 PROCEDURE — 63710000001 HYDROCODONE-ACETAMINOPHEN 7.5-325 MG TABLET: Performed by: NURSE ANESTHETIST, CERTIFIED REGISTERED

## 2023-09-01 PROCEDURE — 25010000002 PROPOFOL 10 MG/ML EMULSION: Performed by: NURSE ANESTHETIST, CERTIFIED REGISTERED

## 2023-09-01 PROCEDURE — 25010000002 HYDROMORPHONE PER 4 MG: Performed by: NURSE ANESTHETIST, CERTIFIED REGISTERED

## 2023-09-01 DEVICE — CLIP LIGAT VASC HORIZON TI MD/LG GRN 6CT: Type: IMPLANTABLE DEVICE | Site: INGUINAL | Status: FUNCTIONAL

## 2023-09-01 RX ORDER — LIDOCAINE HYDROCHLORIDE AND EPINEPHRINE 10; 10 MG/ML; UG/ML
INJECTION, SOLUTION INFILTRATION; PERINEURAL AS NEEDED
Status: DISCONTINUED | OUTPATIENT
Start: 2023-09-01 | End: 2023-09-01 | Stop reason: HOSPADM

## 2023-09-01 RX ORDER — LIDOCAINE HYDROCHLORIDE 20 MG/ML
INJECTION, SOLUTION INFILTRATION; PERINEURAL AS NEEDED
Status: DISCONTINUED | OUTPATIENT
Start: 2023-09-01 | End: 2023-09-01 | Stop reason: SURG

## 2023-09-01 RX ORDER — FENTANYL CITRATE 50 UG/ML
25 INJECTION, SOLUTION INTRAMUSCULAR; INTRAVENOUS
Status: DISCONTINUED | OUTPATIENT
Start: 2023-09-01 | End: 2023-09-01 | Stop reason: HOSPADM

## 2023-09-01 RX ORDER — ONDANSETRON 2 MG/ML
INJECTION INTRAMUSCULAR; INTRAVENOUS AS NEEDED
Status: DISCONTINUED | OUTPATIENT
Start: 2023-09-01 | End: 2023-09-01 | Stop reason: SURG

## 2023-09-01 RX ORDER — MIDAZOLAM HYDROCHLORIDE 1 MG/ML
0.5 INJECTION INTRAMUSCULAR; INTRAVENOUS
Status: DISCONTINUED | OUTPATIENT
Start: 2023-09-01 | End: 2023-09-01 | Stop reason: HOSPADM

## 2023-09-01 RX ORDER — SODIUM CHLORIDE, SODIUM LACTATE, POTASSIUM CHLORIDE, CALCIUM CHLORIDE 600; 310; 30; 20 MG/100ML; MG/100ML; MG/100ML; MG/100ML
9 INJECTION, SOLUTION INTRAVENOUS CONTINUOUS
Status: DISCONTINUED | OUTPATIENT
Start: 2023-09-01 | End: 2023-09-01

## 2023-09-01 RX ORDER — DROPERIDOL 2.5 MG/ML
0.62 INJECTION, SOLUTION INTRAMUSCULAR; INTRAVENOUS
Status: DISCONTINUED | OUTPATIENT
Start: 2023-09-01 | End: 2023-09-01 | Stop reason: HOSPADM

## 2023-09-01 RX ORDER — LABETALOL HYDROCHLORIDE 5 MG/ML
5 INJECTION, SOLUTION INTRAVENOUS
Status: DISCONTINUED | OUTPATIENT
Start: 2023-09-01 | End: 2023-09-01 | Stop reason: HOSPADM

## 2023-09-01 RX ORDER — NALOXONE HCL 0.4 MG/ML
0.2 VIAL (ML) INJECTION AS NEEDED
Status: DISCONTINUED | OUTPATIENT
Start: 2023-09-01 | End: 2023-09-01 | Stop reason: HOSPADM

## 2023-09-01 RX ORDER — CEFAZOLIN SODIUM 2 G/100ML
2 INJECTION, SOLUTION INTRAVENOUS ONCE
Status: COMPLETED | OUTPATIENT
Start: 2023-09-01 | End: 2023-09-01

## 2023-09-01 RX ORDER — FENTANYL CITRATE 50 UG/ML
50 INJECTION, SOLUTION INTRAMUSCULAR; INTRAVENOUS ONCE AS NEEDED
Status: DISCONTINUED | OUTPATIENT
Start: 2023-09-01 | End: 2023-09-01 | Stop reason: HOSPADM

## 2023-09-01 RX ORDER — HYDROCODONE BITARTRATE AND ACETAMINOPHEN 5; 325 MG/1; MG/1
1 TABLET ORAL ONCE AS NEEDED
Status: DISCONTINUED | OUTPATIENT
Start: 2023-09-01 | End: 2023-09-01 | Stop reason: HOSPADM

## 2023-09-01 RX ORDER — LIDOCAINE HYDROCHLORIDE 10 MG/ML
0.5 INJECTION, SOLUTION INFILTRATION; PERINEURAL ONCE AS NEEDED
Status: DISCONTINUED | OUTPATIENT
Start: 2023-09-01 | End: 2023-09-01 | Stop reason: HOSPADM

## 2023-09-01 RX ORDER — DIPHENHYDRAMINE HYDROCHLORIDE 50 MG/ML
12.5 INJECTION INTRAMUSCULAR; INTRAVENOUS
Status: DISCONTINUED | OUTPATIENT
Start: 2023-09-01 | End: 2023-09-01 | Stop reason: HOSPADM

## 2023-09-01 RX ORDER — PANTOPRAZOLE SODIUM 40 MG/1
40 TABLET, DELAYED RELEASE ORAL DAILY
Status: DISCONTINUED | OUTPATIENT
Start: 2023-09-01 | End: 2023-09-03 | Stop reason: HOSPADM

## 2023-09-01 RX ORDER — MAGNESIUM HYDROXIDE 1200 MG/15ML
LIQUID ORAL AS NEEDED
Status: DISCONTINUED | OUTPATIENT
Start: 2023-09-01 | End: 2023-09-01 | Stop reason: HOSPADM

## 2023-09-01 RX ORDER — ONDANSETRON 2 MG/ML
4 INJECTION INTRAMUSCULAR; INTRAVENOUS EVERY 6 HOURS PRN
Status: DISCONTINUED | OUTPATIENT
Start: 2023-09-01 | End: 2023-09-03 | Stop reason: HOSPADM

## 2023-09-01 RX ORDER — ACETAMINOPHEN 325 MG/1
650 TABLET ORAL EVERY 6 HOURS PRN
Status: DISCONTINUED | OUTPATIENT
Start: 2023-09-01 | End: 2023-09-03 | Stop reason: HOSPADM

## 2023-09-01 RX ORDER — HYDRALAZINE HYDROCHLORIDE 20 MG/ML
5 INJECTION INTRAMUSCULAR; INTRAVENOUS
Status: DISCONTINUED | OUTPATIENT
Start: 2023-09-01 | End: 2023-09-01 | Stop reason: HOSPADM

## 2023-09-01 RX ORDER — HYDROCODONE BITARTRATE AND ACETAMINOPHEN 7.5; 325 MG/1; MG/1
1 TABLET ORAL EVERY 4 HOURS PRN
Status: DISCONTINUED | OUTPATIENT
Start: 2023-09-01 | End: 2023-09-01 | Stop reason: HOSPADM

## 2023-09-01 RX ORDER — SODIUM CHLORIDE 0.9 % (FLUSH) 0.9 %
3-10 SYRINGE (ML) INJECTION AS NEEDED
Status: DISCONTINUED | OUTPATIENT
Start: 2023-09-01 | End: 2023-09-01 | Stop reason: HOSPADM

## 2023-09-01 RX ORDER — PROMETHAZINE HYDROCHLORIDE 25 MG/1
25 TABLET ORAL ONCE AS NEEDED
Status: DISCONTINUED | OUTPATIENT
Start: 2023-09-01 | End: 2023-09-01 | Stop reason: HOSPADM

## 2023-09-01 RX ORDER — PROPOFOL 10 MG/ML
VIAL (ML) INTRAVENOUS AS NEEDED
Status: DISCONTINUED | OUTPATIENT
Start: 2023-09-01 | End: 2023-09-01 | Stop reason: SURG

## 2023-09-01 RX ORDER — FLUMAZENIL 0.1 MG/ML
0.2 INJECTION INTRAVENOUS AS NEEDED
Status: DISCONTINUED | OUTPATIENT
Start: 2023-09-01 | End: 2023-09-01 | Stop reason: HOSPADM

## 2023-09-01 RX ORDER — EPHEDRINE SULFATE 50 MG/ML
5 INJECTION, SOLUTION INTRAVENOUS ONCE AS NEEDED
Status: DISCONTINUED | OUTPATIENT
Start: 2023-09-01 | End: 2023-09-01 | Stop reason: HOSPADM

## 2023-09-01 RX ORDER — ONDANSETRON 2 MG/ML
4 INJECTION INTRAMUSCULAR; INTRAVENOUS ONCE AS NEEDED
Status: DISCONTINUED | OUTPATIENT
Start: 2023-09-01 | End: 2023-09-01 | Stop reason: HOSPADM

## 2023-09-01 RX ORDER — FENTANYL CITRATE 50 UG/ML
INJECTION, SOLUTION INTRAMUSCULAR; INTRAVENOUS AS NEEDED
Status: DISCONTINUED | OUTPATIENT
Start: 2023-09-01 | End: 2023-09-01 | Stop reason: SURG

## 2023-09-01 RX ORDER — SODIUM CHLORIDE 0.9 % (FLUSH) 0.9 %
3 SYRINGE (ML) INJECTION EVERY 12 HOURS SCHEDULED
Status: DISCONTINUED | OUTPATIENT
Start: 2023-09-01 | End: 2023-09-01 | Stop reason: HOSPADM

## 2023-09-01 RX ORDER — PROMETHAZINE HYDROCHLORIDE 25 MG/1
25 SUPPOSITORY RECTAL ONCE AS NEEDED
Status: DISCONTINUED | OUTPATIENT
Start: 2023-09-01 | End: 2023-09-01 | Stop reason: HOSPADM

## 2023-09-01 RX ORDER — TRAMADOL HYDROCHLORIDE 50 MG/1
50 TABLET ORAL EVERY 6 HOURS PRN
Status: DISCONTINUED | OUTPATIENT
Start: 2023-09-01 | End: 2023-09-03 | Stop reason: HOSPADM

## 2023-09-01 RX ORDER — FAMOTIDINE 10 MG/ML
20 INJECTION, SOLUTION INTRAVENOUS ONCE
Status: COMPLETED | OUTPATIENT
Start: 2023-09-01 | End: 2023-09-01

## 2023-09-01 RX ORDER — HYDROCODONE BITARTRATE AND ACETAMINOPHEN 5; 325 MG/1; MG/1
2 TABLET ORAL EVERY 4 HOURS PRN
Status: DISCONTINUED | OUTPATIENT
Start: 2023-09-01 | End: 2023-09-03 | Stop reason: HOSPADM

## 2023-09-01 RX ORDER — HYDROMORPHONE HYDROCHLORIDE 1 MG/ML
0.25 INJECTION, SOLUTION INTRAMUSCULAR; INTRAVENOUS; SUBCUTANEOUS
Status: DISCONTINUED | OUTPATIENT
Start: 2023-09-01 | End: 2023-09-01 | Stop reason: HOSPADM

## 2023-09-01 RX ORDER — IPRATROPIUM BROMIDE AND ALBUTEROL SULFATE 2.5; .5 MG/3ML; MG/3ML
3 SOLUTION RESPIRATORY (INHALATION) ONCE AS NEEDED
Status: DISCONTINUED | OUTPATIENT
Start: 2023-09-01 | End: 2023-09-01 | Stop reason: HOSPADM

## 2023-09-01 RX ADMIN — FENTANYL CITRATE 12.5 MCG: 50 INJECTION, SOLUTION INTRAMUSCULAR; INTRAVENOUS at 11:34

## 2023-09-01 RX ADMIN — HYDROCODONE BITARTRATE AND ACETAMINOPHEN 1 TABLET: 7.5; 325 TABLET ORAL at 12:19

## 2023-09-01 RX ADMIN — FAMOTIDINE 20 MG: 10 INJECTION INTRAVENOUS at 09:37

## 2023-09-01 RX ADMIN — PROPOFOL 20 MG: 10 INJECTION, EMULSION INTRAVENOUS at 11:15

## 2023-09-01 RX ADMIN — FENTANYL CITRATE 12.5 MCG: 50 INJECTION, SOLUTION INTRAMUSCULAR; INTRAVENOUS at 11:07

## 2023-09-01 RX ADMIN — CEFAZOLIN SODIUM 2 G: 2 INJECTION, SOLUTION INTRAVENOUS at 10:57

## 2023-09-01 RX ADMIN — ONDANSETRON 4 MG: 2 INJECTION INTRAMUSCULAR; INTRAVENOUS at 11:07

## 2023-09-01 RX ADMIN — HYDROMORPHONE HYDROCHLORIDE 0.25 MG: 1 INJECTION, SOLUTION INTRAMUSCULAR; INTRAVENOUS; SUBCUTANEOUS at 12:29

## 2023-09-01 RX ADMIN — PROPOFOL 40 MG: 10 INJECTION, EMULSION INTRAVENOUS at 11:07

## 2023-09-01 RX ADMIN — SODIUM CHLORIDE, POTASSIUM CHLORIDE, SODIUM LACTATE AND CALCIUM CHLORIDE 9 ML/HR: 600; 310; 30; 20 INJECTION, SOLUTION INTRAVENOUS at 09:35

## 2023-09-01 RX ADMIN — HYDROCODONE BITARTRATE AND ACETAMINOPHEN 2 TABLET: 5; 325 TABLET ORAL at 18:00

## 2023-09-01 RX ADMIN — PROPOFOL 75 MCG/KG/MIN: 10 INJECTION, EMULSION INTRAVENOUS at 11:07

## 2023-09-01 RX ADMIN — HYDROMORPHONE HYDROCHLORIDE 0.25 MG: 1 INJECTION, SOLUTION INTRAMUSCULAR; INTRAVENOUS; SUBCUTANEOUS at 12:19

## 2023-09-01 RX ADMIN — LIDOCAINE HYDROCHLORIDE 60 MG: 20 INJECTION, SOLUTION INFILTRATION; PERINEURAL at 11:07

## 2023-09-01 NOTE — ANESTHESIA PREPROCEDURE EVALUATION
Anesthesia Evaluation     Patient summary reviewed and Nursing notes reviewed   no history of anesthetic complications:   NPO Solid Status: > 8 hours  NPO Liquid Status: > 2 hours           Airway   Mallampati: II  TM distance: >3 FB  Neck ROM: full  Dental    (+) upper dentures    Pulmonary     breath sounds clear to auscultation  (+) a smoker Former, COPD, asthma,shortness of breath, sleep apnea  Cardiovascular - normal exam    ECG reviewed    (+) pacemaker pacemaker interrogated <1 month ago, hypertension, dysrhythmias    ROS comment: EF = 59.7%    Date/Time: 8/4/2023 1:22 AM  Performed by: Mary Grace Culp MD  Authorized by: Mary Grace Culp MD   Comparison: compared with previous ECG   Similar to previous ECG  Rhythm: atrial fibrillation  Pacing: ventricular paced rhythm  Clinical impression: abnormal EKG    PM interrogated 8/4      Neuro/Psych  (+) numbness, poor historian.    ROS Comment: [facility]  GI/Hepatic/Renal/Endo    (+) GERD, PUD, renal disease stones, thyroid problem     ROS Comment: CT scan demonstrates a enlarged thyroid with some tracheal deviation. Patient is asymptomatic with regards to thyroid issues and also respiratory complaints.  Can lie supine w/o SOA.      Musculoskeletal     Abdominal    Substance History - negative use     OB/GYN negative ob/gyn ROS         Other   arthritis,                     Anesthesia Plan    ASA 3     MAC     (/67 (BP Location: Right arm, Patient Position: Sitting)   Pulse 60   Temp 36.6 øC (97.8 øF)   Resp 16   LMP  (LMP Unknown)   SpO2 99%     * enlarged thyroid w/ some tracheal deviation noted on CT scan --> she's been cleared by adama alaniz  who notes difficulty w/ intubation not likely *     I have reviewed the patient's history with the patient and the chart, including all pertinent laboratory results and imaging. I have explained the risks of anesthesia including but not limited to dental damage, corneal abrasion, nerve injury, MI, stroke, and  death.    )  intravenous induction     Anesthetic plan, risks, benefits, and alternatives have been provided, discussed and informed consent has been obtained with: patient and child.    CODE STATUS:

## 2023-09-01 NOTE — ANESTHESIA POSTPROCEDURE EVALUATION
Patient: Brittany Villeda    Procedure Summary       Date: 09/01/23 Room / Location:  [facility] /  [facility]    Anesthesia Start: 1101 Anesthesia Stop: 1205    Procedure: INGUINAL HERNIA REPAIR LEFT (Left: Groin) Diagnosis:       Left inguinal hernia      (Left inguinal hernia [K40.90])    Surgeons: Antonio Foster MD Provider: Ayesha Renteria MD    Anesthesia Type: MAC ASA Status: 3            Anesthesia Type: MAC    Vitals  Vitals Value Taken Time   /66 09/01/23 1231   Temp 36.3 øC (97.4 øF) 09/01/23 1202   Pulse 60 09/01/23 1242   Resp 17 09/01/23 1230   SpO2 95 % 09/01/23 1242   Vitals shown include unvalidated device data.        Post Anesthesia Care and Evaluation    Patient location during evaluation: bedside  Patient participation: complete - patient participated  Level of consciousness: awake  Pain management: adequate    Airway patency: patent  Anesthetic complications: No anesthetic complications  PONV Status: controlled  Cardiovascular status: acceptable  Respiratory status: acceptable  Hydration status: acceptable    Comments: /66   Pulse 60   Temp 36.3 øC (97.4 øF) (Oral)   Resp 17   LMP  (LMP Unknown)   SpO2 100%

## 2023-09-01 NOTE — PLAN OF CARE
Goal Outcome Evaluation:  Plan of Care Reviewed With: patient, daughter        Progress: improving  Outcome Evaluation: LLQ incision CD&I with liquid glue. Denies any pain after pain medication given in PACU. Ambulated from stretcher to bed. Voiding. Tolerating clear liquids. Family at bedside. VSS on room air. SCD's on.

## 2023-09-01 NOTE — OP NOTE
PREOPERATIVE DIAGNOSIS:  Symptomatic left inguinal hernia    POSTOPERATIVE DIAGNOSIS (FINDINGS):  Same    PROCEDURE:  Open left inguinal hernia repair    SURGEON:  Antonio Foster MD    ASSISTANT:  Mayank Mena was responsible for performing the following activities: suction, irrigation, suturing, closing, retraction, and placing dressing, and their skilled assistance was necessary for the success of this case.    ANESTHESIA:  MAC    EBL:  Minimal    SPECIMEN(S):  none    DESCRIPTION:  In supine position under sedation prepped and draped usual sterile manner.  1% lidocaine with epinephrine infiltrated locally.  Oblique incision made and carried to and through the external oblique fascia.  Large indirect inguinal hernia encountered.  The round ligament and vessels were clipped and divided distally.  Dissection was carried proximally to the internal ring and herniated preperitoneal fat and large hernia sac were reduced to the preperitoneal plane.  Generous medial relaxing incision was made.  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  Good hemostasis noted.  External bleak closed with running 3-0 Vicryl.  Jacques's running 3-0 Vicryl.  Skin 5-0 Vicryl subcuticular followed by Exofin.  Tolerated well.    Antonio Foster M.D.

## 2023-09-01 NOTE — H&P
ASSESSMENT/PLAN:    88-year-old lady with symptomatic moderate left inguinal hernia.  Presents for open left inguinal hernia repair.  She understands nature of the procedure and the risks and wishes to proceed as outlined.    CC:     Left inguinal hernia    HPI:    88-year-old lady with left inguinal hernia which has become increasingly symptomatic and she would like to have that repaired.  Relevant review of systems negative other than presenting complaints.    ENDOSCOPY:   Colonoscopy: 2019, normal per her report    SOCIAL HISTORY:   Denies tobacco use  Denies alcohol use    FAMILY HISTORY:    Colorectal cancer: Negative    PREVIOUS ABDOMINAL SURGERY    Hysterectomy  Umbilical hernia repair  Laparoscopic cholecystectomy    PAST MEDICAL HISTORY:    COPD  Hypertension  Pulmonary hypertension  Hypertensive heart disease  Obstructive sleep apnea  Cardiac arrhythmias  Gastroesophageal reflux disease    MEDICATIONS:   Reviewed    ALLERGIES:   Codeine  Amitriptyline  Augmentin  Aspirin  Bactrim  Iodinated contrast  Latex  Naproxen  NSAIDs  Sulfa  Tramadol    PHYSICAL EXAM:   Constitutional: Well-developed well-nourished, no acute distress  Neck: Supple, no palpable mass, trachea midline  Respiratory: Normal nonlabored inspiratory effort  Cardiovascular: Regular rate, no jugular venous distention  Gastrointestinal: Soft, nontender  Genitourinary: Moderate size left inguinal hernia  Psychiatric: Alert and oriented ×3, normal affect     BERNARDO BAIRES M.D.

## 2023-09-02 PROCEDURE — 63710000001 HYDROCODONE-ACETAMINOPHEN 5-325 MG TABLET: Performed by: SURGERY

## 2023-09-02 PROCEDURE — A9270 NON-COVERED ITEM OR SERVICE: HCPCS | Performed by: SURGERY

## 2023-09-02 PROCEDURE — 99024 POSTOP FOLLOW-UP VISIT: CPT | Performed by: SURGERY

## 2023-09-02 PROCEDURE — 63710000001 PANTOPRAZOLE 40 MG TABLET DELAYED-RELEASE: Performed by: SURGERY

## 2023-09-02 PROCEDURE — G0378 HOSPITAL OBSERVATION PER HR: HCPCS

## 2023-09-02 RX ADMIN — HYDROCODONE BITARTRATE AND ACETAMINOPHEN 2 TABLET: 5; 325 TABLET ORAL at 01:09

## 2023-09-02 RX ADMIN — PANTOPRAZOLE SODIUM 40 MG: 40 TABLET, DELAYED RELEASE ORAL at 08:40

## 2023-09-02 RX ADMIN — HYDROCODONE BITARTRATE AND ACETAMINOPHEN 2 TABLET: 5; 325 TABLET ORAL at 18:13

## 2023-09-02 RX ADMIN — HYDROCODONE BITARTRATE AND ACETAMINOPHEN 2 TABLET: 5; 325 TABLET ORAL at 08:47

## 2023-09-02 NOTE — PROGRESS NOTES
Chief Complaint:    S/P Left inguinal hernia repair, POD 1    Subjective:    The patient seems to be doing well.  She is very hard of hearing.  The family has requested she stay 1 more night to help make arrangements at home.    Objective:    Temp:  [97.4 °F (36.3 °C)-98.9 °F (37.2 °C)] 98.4 °F (36.9 °C)  Heart Rate:  [60-69] 60  Resp:  [14-18] 16  BP: ()/(59-83) 90/59    Physical Exam  Constitutional:       Appearance: She is not ill-appearing or toxic-appearing.   Abdominal:      Palpations: Abdomen is soft.      Tenderness: There is no abdominal tenderness.   Neurological:      Mental Status: She is alert.   Psychiatric:         Behavior: Behavior is cooperative.       Results:    There are no new labs today.    Impression/Plan:    The patient is POD 1 from a left inguinal hernia repair.  The patient appears to be recovering well and we will continue to increase her activity.  The plan is for discharge to home tomorrow.    Holden Lovell Jr., M.D.

## 2023-09-02 NOTE — PLAN OF CARE
Goal Outcome Evaluation:  Plan of Care Reviewed With: patient, daughter        Progress: improving  Outcome Evaluation: Patient is A/O VSS placed on 2L O2 while sleeping r/t drop in O2 sats. Ambulated in the [place] with walker, tolerated well. Family at bedside. Slept most of night with one request for pain medication after ambulating to bathroom during night. Tolerating diet. Incision SHELLIE with liquid bandage. No drainage noted. Will continue to monitor.

## 2023-09-02 NOTE — PLAN OF CARE
Goal Outcome Evaluation:  Plan of Care Reviewed With: patient, family        Progress: improving  Outcome Evaluation: VSS, on room air during this shift, walked in halls w/ assist x1 gait belt and walker, voiding without issue, norco x1 w/ relief, no c/o n/v, L lower abdomen incision is CDI and approximated w/ dermabond, falls precautions maintained, family at bedside and updated on plan of care.

## 2023-09-03 ENCOUNTER — READMISSION MANAGEMENT (OUTPATIENT)
Dept: CALL CENTER | Facility: HOSPITAL | Age: 88
End: 2023-09-03
Payer: MEDICARE

## 2023-09-03 VITALS
HEART RATE: 60 BPM | WEIGHT: 173.9 LBS | HEIGHT: 64 IN | OXYGEN SATURATION: 95 % | SYSTOLIC BLOOD PRESSURE: 98 MMHG | DIASTOLIC BLOOD PRESSURE: 60 MMHG | TEMPERATURE: 97.9 F | RESPIRATION RATE: 16 BRPM | BODY MASS INDEX: 29.69 KG/M2

## 2023-09-03 PROCEDURE — A9270 NON-COVERED ITEM OR SERVICE: HCPCS | Performed by: SURGERY

## 2023-09-03 PROCEDURE — 63710000001 HYDROCODONE-ACETAMINOPHEN 5-325 MG TABLET: Performed by: SURGERY

## 2023-09-03 PROCEDURE — G0378 HOSPITAL OBSERVATION PER HR: HCPCS

## 2023-09-03 PROCEDURE — 63710000001 PANTOPRAZOLE 40 MG TABLET DELAYED-RELEASE: Performed by: SURGERY

## 2023-09-03 RX ORDER — HYDROCODONE BITARTRATE AND ACETAMINOPHEN 5; 325 MG/1; MG/1
TABLET ORAL
Qty: 20 TABLET | Refills: 0 | Status: SHIPPED | OUTPATIENT
Start: 2023-09-03 | End: 2023-09-14

## 2023-09-03 RX ADMIN — HYDROCODONE BITARTRATE AND ACETAMINOPHEN 2 TABLET: 5; 325 TABLET ORAL at 00:01

## 2023-09-03 RX ADMIN — HYDROCODONE BITARTRATE AND ACETAMINOPHEN 2 TABLET: 5; 325 TABLET ORAL at 09:42

## 2023-09-03 RX ADMIN — PANTOPRAZOLE SODIUM 40 MG: 40 TABLET, DELAYED RELEASE ORAL at 08:47

## 2023-09-03 NOTE — PLAN OF CARE
Goal Outcome Evaluation:  Plan of Care Reviewed With: patient, daughter        Progress: improving  Outcome Evaluation: Patient is A/O VSS on RA. Pain well controlled with home medication regimen. Ambulated unit with 1 person assist and walker. Bed alarm refused, family remaining at bedside and involved in care. Slept well most of shift. Possible DC today.

## 2023-09-03 NOTE — NURSING NOTE
VSS, on room air, voiding without issue, L groin incision is CDI and approximated w/ dermabond, pain controlled w/ norco, no n/v, tolerating regular diet. Patient and her daughters educated on post op care, when to seek help, new meds, and the need to schedule a follow up appt. Meds via meds to beds. Pt daughter is taking her home and she will have family with her at all times. Pt is eager for d/c.

## 2023-09-03 NOTE — OUTREACH NOTE
Prep Survey      Flowsheet Row Responses   [facility] patient discharged from? [place]   Is LACE score < 7 ? No   Eligibility [facility]   Date of Admission 09/01/23   Date of Discharge 09/03/23   Discharge Disposition Home or Self Care   Discharge diagnosis Left inguinal hernia,  Left inguinal hernia repair   Does the patient have one of the following disease processes/diagnoses(primary or secondary)? General Surgery   Does the patient have Home health ordered? No   Is there a DME ordered? No   Prep survey completed? Yes            Delma VERNON - Registered Nurse

## 2023-09-03 NOTE — DISCHARGE SUMMARY
Discharge Summary    Date of admission: 9/1/2023    Date of discharge: 9/3/2023    Final diagnosis:    1.  Left inguinal hernia  2.  COPD  3.  Pulmonary hypertension  4.  Hypertension  5.  Cardiac arrhythmias    Procedures performed during admission:    1.  Left inguinal hernia repair on 9/1/2023    Consulting physicians:    1.  None    Reason for admission:    The patient is a pleasant 88-year-old female who had a left inguinal hernia that was becoming increasingly symptomatic.  She was admitted for definitive surgical management.    Hospital course:    The patient was admitted on 9/1/2023 and taken to the operating room where she underwent an open left inguinal hernia repair.  Postoperative she was transferred to the floor where she did very well.  She remained afebrile and hemodynamically stable.  She had minimal pain that was adequately controlled with oral pain medications.  Once her family had made appropriate arrangements to provide care, she was already for discharge to home on 9/3/2023.    Prescriptions:    She was given a prescription for hydrocodone.    Follow-up:    She will follow-up with Dr. Foster in 2 weeks.    Holden Lovell Jr., M.D.

## 2023-09-04 ENCOUNTER — TRANSITIONAL CARE MANAGEMENT TELEPHONE ENCOUNTER (OUTPATIENT)
Dept: CALL CENTER | Facility: HOSPITAL | Age: 88
End: 2023-09-04
Payer: MEDICARE

## 2023-09-04 NOTE — OUTREACH NOTE
Call Center TCM Note      Flowsheet Row Responses   [facility] patient discharged from? [place]   Does the patient have one of the following disease processes/diagnoses(primary or secondary)? General Surgery   TCM attempt successful? Yes  [No verbal release from PCP on file]   Call start time 0951   Call end time 0957   Discharge diagnosis Left inguinal hernia,  Left inguinal hernia repair   Is patient permission given to speak with other caregiver? Yes   List who call center can speak with Chelsey   Person spoke with today (if not patient) and relationship Amina-Dtr   Meds reviewed with patient/caregiver? Yes   Is the patient having any side effects they believe may be caused by any medication additions or changes? No   Does the patient have all medications related to this admission filled (includes all antibiotics, pain medications, etc.) Yes   Is the patient taking all medications as directed (includes completed medication regime)? Yes   Does the patient have an appointment with their PCP within 7-14 days of discharge? No appointments available   Nursing Interventions Routed TCM call to PCP office   Has home health visited the patient within 72 hours of discharge? N/A   Psychosocial issues? No   Did the patient receive a copy of their discharge instructions? Yes   Nursing interventions Reviewed instructions with patient   What is the patient's perception of their health status since discharge? Improving   Nursing interventions Nurse provided patient education   Is the patient /caregiver able to teach back basic post-op care? Keep incision areas clean,dry and protected, No tub bath, swimming, or hot tub until instructed by MD, Take showers only when approved by MD-sponge bathe until then, Practice 'cough and deep breath'   Is the patient/caregiver able to teach back signs and symptoms of incisional infection? Increased redness, swelling or pain at the incisonal site, Increased drainage or bleeding,  Incisional warmth, Pus or odor from incision, Fever   Is the patient/caregiver able to teach back steps to recovery at home? Set small, achievable goals for return to baseline health, Rest and rebuild strength, gradually increase activity, Eat a well-balance diet   If the patient is a current smoker, are they able to teach back resources for cessation? Not a smoker   Is the patient/caregiver able to teach back the hierarchy of who to call/visit for symptoms/problems? PCP, Specialist, Home health nurse, Urgent Care, ED, 911 Yes   TCM call completed? Yes   Wrap up additional comments Dtr states patient doing well, some pain that is controlled with medication.   Call end time 0957   Would this patient benefit from a Referral to Amb Social Work? No   Is the patient interested in additional calls from an ambulatory ? No            Sharri Ahn RN    9/4/2023, 09:57 EDT

## 2023-09-06 RX ORDER — POTASSIUM CHLORIDE 750 MG/1
TABLET, EXTENDED RELEASE ORAL
Qty: 90 TABLET | Refills: 1 | Status: SHIPPED | OUTPATIENT
Start: 2023-09-06

## 2023-09-06 RX ORDER — PANTOPRAZOLE SODIUM 40 MG/1
40 TABLET, DELAYED RELEASE ORAL DAILY
Qty: 90 TABLET | Refills: 1 | Status: SHIPPED | OUTPATIENT
Start: 2023-09-06

## 2023-09-06 NOTE — TELEPHONE ENCOUNTER
Rx Refill Note  Requested Prescriptions     Pending Prescriptions Disp Refills    pantoprazole (PROTONIX) 40 MG EC tablet [Pharmacy Med Name: PANTOPRAZOLE SODIUM 40 MG Tablet Delayed Release] 180 tablet 1     Sig: TAKE 1 TABLET TWICE DAILY    potassium chloride (K-DUR,KLOR-CON) 10 MEQ CR tablet [Pharmacy Med Name: POTASSIUM CHLORIDE ER 10 MEQ Tablet Extended Release] 90 tablet      Sig: TAKE 1 TABLET EVERY DAY      Last office visit with prescribing clinician: Visit date not found   Last telemedicine visit with prescribing clinician: Visit date not found   Next office visit with prescribing clinician: Visit date not found                         Would you like a call back once the refill request has been completed: [] Yes [] No    If the office needs to give you a call back, can they leave a voicemail: [] Yes [] No    Silviano Gonzalez MA  09/06/23, 08:39 EDT

## 2023-09-08 ENCOUNTER — OFFICE VISIT (OUTPATIENT)
Dept: FAMILY MEDICINE CLINIC | Facility: CLINIC | Age: 88
End: 2023-09-08
Payer: MEDICARE

## 2023-09-08 VITALS
SYSTOLIC BLOOD PRESSURE: 130 MMHG | OXYGEN SATURATION: 99 % | BODY MASS INDEX: 30.13 KG/M2 | WEIGHT: 176.5 LBS | HEIGHT: 64 IN | TEMPERATURE: 96 F | DIASTOLIC BLOOD PRESSURE: 72 MMHG | HEART RATE: 66 BPM

## 2023-09-08 DIAGNOSIS — Z09 HOSPITAL DISCHARGE FOLLOW-UP: Primary | ICD-10-CM

## 2023-09-08 NOTE — PROGRESS NOTES
"Transitional Care Follow Up Visit  Subjective     Brittany Villeda is a 88 y.o. female who presents for a transitional care management visit.    Within 48 business hours after discharge our office contacted her via telephone to coordinate her care and needs.      I reviewed and discussed the details of that call along with the discharge summary, hospital problems, inpatient lab results, inpatient diagnostic studies, and consultation reports with Brittany.     Current outpatient and discharge medications have been reconciled for the patient.  Reviewed by: Mega Esparza MD          9/3/2023    11:11 AM   Date of TCM Phone Call   [facility]   Date of Admission 9/1/2023   Date of Discharge 9/3/2023   Discharge Disposition Home or Self Care     Risk for Readmission (LACE) Score: 9 (9/3/2023  6:00 AM)      History of Present Illness   Course During Hospital Stay: Hospital discharge follow-up for left inguinal hernia repair.  2 night hospital stay.  Discharge summary reviewed.  She had reported unremarkable postoperative stay.  The surgeon describes pain management controlled with oral meds.  Discharge home with family.  Prescribed hydrocodone to take as needed.    She has not taken the hydrocodone because she states it makes her \"hallucinate\".  No trouble with constipation.  Taking Tylenol.  She is sore with movement.  She has a lot of bruising she states from the incision site.  She otherwise feels well.  I reviewed her medication.  She is taking things as directed.     The following portions of the patient's history were reviewed and updated as appropriate: allergies, current medications, past family history, past medical history, past social history, past surgical history, and problem list.    Review of Systems    Objective   Physical Exam  Vitals and nursing note reviewed.   Constitutional:       General: She is not in acute distress.     Appearance: She is well-developed.   Neck:      " "Comments: On CT scan she has a enlarged thyroid that travels into the retrosternal/Tracheal area.  Nothing on palpation today.  Cardiovascular:      Rate and Rhythm: Normal rate.   Pulmonary:      Effort: Pulmonary effort is normal.      Breath sounds: No stridor.   Abdominal:      Comments: The wound appears unremarkable other than some surrounding ecchymosis.  There is also a adjacent slightly tender hematoma.  Abdomen is otherwise soft and nontender.  No erythema.  No wound dehiscence.  No wound drainage.   Skin:     General: Skin is warm and dry.   Psychiatric:         Mood and Affect: Mood normal.       Assessment & Plan   Diagnoses and all orders for this visit:    1. Hospital discharge follow-up (Primary)      Hospital discharge follow-up.  Hernia repair surgery.  She has a surrounding hematoma that is overall unremarkable.  Heating pad may help with comfort.  She does not want to take hydrocodone because of some \"hallucinations\".  I want her to try half of a hydrocodone.  Watch out for constipation.  Otherwise continue Tylenol.  Do not take Tylenol with hydrocodone.  I will see her back as scheduled in about a month.    Enlarged retrosternal thyroid.  Seen on CT scan.  Unremarkable exam today.  No respiratory issues.               "

## 2023-09-13 ENCOUNTER — READMISSION MANAGEMENT (OUTPATIENT)
Dept: CALL CENTER | Facility: HOSPITAL | Age: 88
End: 2023-09-13
Payer: MEDICARE

## 2023-09-13 NOTE — OUTREACH NOTE
General Surgery Week 2 Survey      Flowsheet Row Responses   [facility] patient discharged from? [place]   Does the patient have one of the following disease processes/diagnoses(primary or secondary)? General Surgery   Week 2 attempt successful? Yes   Call start time 1559   Call end time 1606   Does the patient have a follow up appointment scheduled with their surgeon? Yes   Has the patient kept scheduled appointments due by today? Yes   What is the patient's perception of their health status since discharge? Improving   Week 2 call completed? Yes   Wrap up additional comments Daughter reports pt is improving. Daughter also reports increased drainage to incision. Pt has surgery f/u first thing in the morning. Daughter instructed to reinforce with gauze and leave until MD appointment. Daughter agreeable to do so.   Call end time 1606            Chely VERNON - Registered Nurse

## 2023-09-14 ENCOUNTER — OFFICE VISIT (OUTPATIENT)
Dept: SURGERY | Facility: CLINIC | Age: 88
End: 2023-09-14
Payer: MEDICARE

## 2023-09-14 VITALS
BODY MASS INDEX: 30.25 KG/M2 | HEIGHT: 64 IN | DIASTOLIC BLOOD PRESSURE: 78 MMHG | WEIGHT: 177.2 LBS | SYSTOLIC BLOOD PRESSURE: 136 MMHG

## 2023-09-14 DIAGNOSIS — Z48.89 POSTOPERATIVE VISIT: Primary | ICD-10-CM

## 2023-09-16 NOTE — PROGRESS NOTES
[facility] Progress Note    Today's Date: 6/4/2020    Chief Complaint:   Chief Complaint   Patient presents with   • Follow-up     2m   • Edema     B/L LE   • fatigue       PCP: Shalom Appiah MD  Referring Provider: Shalom Appiah MD        HPI:  74 year old female with history of nonischemic cardiomyopathy with negative cardiac cath in 2015 and recent Lexiscan stress test negative for ischemia, hypertension, history of atrial fibrillation status post ablation, and congestive heart failure with systolic and diastolic dysfunction.    Patient continues to complain of prominent dyspnea on exertion with short distance walking, and ankle and lower leg edema.  Her diuretic dose was increased according to the patient, without significant improvement in her symptoms.    ROS: 12point ROS negative other than mentioned in HPI      Current Medications:   Current Outpatient Medications   Medication   • traMADol (ULTRAM) 50 MG tablet   • docusate sodium (COLACE) 100 MG capsule   • ferrous sulfate 325 (65 FE) MG tablet   • Lactulose 20 GM/30ML Solution   • pantoprazole (PROTONIX) 40 MG packet for oral suspension   • apixaBAN (ELIQUIS) 5 MG Tab   • gabapentin (NEURONTIN) 300 MG capsule   • furosemide (LASIX) 40 MG tablet     No current facility-administered medications for this visit.        Relevant Past Medical History:  As noted in HPI        Social History:  Social History     Tobacco Use   Smoking Status Never Smoker   Smokeless Tobacco Never Used     Social History     Substance and Sexual Activity   Alcohol Use No   • Frequency: Never     Social History     Substance and Sexual Activity   Drug Use No       Family History:   Family History   Problem Relation Age of Onset   • Heart disease Mother        Examination:   Blood pressure 118/70, pulse (!) 108, weight 65.8 kg (145 lb).  Weight    06/04/20 1041   Weight: 65.8 kg (145 lb)     Gen: NAD, AO   Eyes: Pupils equal and reactive to light.    ENT: No significant  Postoperative visit    Open left inguinal hernia repair 9/1/2023    Office visit: Incision is healing well and currently she appears to be doing well.  She and family indicate that pain early on was more significant but has leveled off at this point.  She is free to return to her normal level of activity and she will return here as needed.   abnormalities  Cardiac: RRR, S1/2, no murmurs.  2+ edema   vascular: Pulses equal and intact.  No carotid bruit/thrill.  No JVD    Respiratory: Clear to auscultation and percussion   GI: Soft nontender no masses   Neuro: non-focal, alert, no significant abnormalities or deficits  Musculoskeletal: Normal range of motion  Skin: no visible lesions  Psych: mood appropriate        Clinical Data:  The following were personally visualized and interpreted by me:    Labs, imaging, recent records    Recent Labs  No results for input(s): CHOLESTEROL, HDL, TRIGLYCERIDE, CALCLDL, LDLDIR, NONHDL in the last 8765 hours.    Recent Labs   Lab 05/22/20  0508  12/16/19  1555   Sodium 135   < >  --    Chloride 103   < >  --    BUN 40*   < >  --    GFR Estimate,  23  eGFR 15 - 29 mL/min/1.73m2 = Severe decrease in kidney function. Stage 4 CKD (chronic kidney disease), or severe kidney disease.   < >  --    BUN/Creatinine Ratio 17   < >  --    Potassium 4.0   < >  --    Glucose 114*   < >  --    Creatinine 2.32*   < >  --    GFR Estimate, Non  20  eGFR 15 - 29 mL/min/1.73m2 = Severe decrease in kidney function. Stage 4 CKD (chronic kidney disease), or severe kidney disease.   < >  --    CALCIUM 8.7   < >  --    TSH  --   --  4.731  Findings most consistent with euthyroid state, no additional testing suggested. TSH may be normal in patients with thyroid dysfunction and pituitary disease. Clinical correlation recommended.  (Reflex TSH algorithm is not recommended in hospitalized patients. A variety of drugs, as well as serious acute and chronic illnesses may alter thyroid function tests. Commonly implicated drugs include glucocorticoids, dopamine, carbamazepine, iodine,   amiodarone, lithium and heparin.)    < > = values in this interval not displayed.          Hemoglobin A1C   Date Value   12/16/2019 5.7 % (H)   12/16/2019 A1C%           eAG mg/dL   12/16/2019 6.0            126   12/16/2019 6.5             140   12/16/2019 7.0            154   12/16/2019 7.5            169   12/16/2019 8.0            183   12/16/2019 8.5            197   12/16/2019 9.0            212   12/16/2019 9.5            226   12/16/2019 10.0           240   12/16/2019 ----DIABETIC SCREENING---   12/16/2019 NON DIABETIC                 <5.7%   12/16/2019 INCREASED RISK                5.7-6.4%   12/16/2019 DIAGNOSTIC FOR DIABETES      >6.4%   12/16/2019 ----DIABETIC CONTROL---   .    Recent Labs   Lab 05/22/20  0508 05/21/20  0502 05/20/20  0520   WBC 2.4* 2.2* 2.9*   RBC 3.33* 3.41* 3.40*   HGB 10.4* 10.1* 10.6*   HCT 32.2* 32.5* 32.7*   MCV 96.7 95.3 96.2   MCHC 32.3 31.1* 32.4   RDW-CV 17.8* 18.1* 17.8*    145 135*   LYMPH 31 34 28       Problems/Plans:  Essential hypertension  Blood pressure is well controlled on the current regimen.  Patient was advised to follow dietary salt restrictions.      Chronic diastolic CHF (congestive heart failure) (CMS/HCC)  Congestive heart failure due to combined systolic and diastolic heart failure, most recent Lexiscan stress test showed an ejection fraction of about 40%.  I suspect that she may have underlying amyloid heart disease.  A pyrophosphate scan was ordered.  I did not increase her diuretics at this time because she has chronic kidney disease and her renal function is tenuous.  She currently has class II to class III symptoms of heart failure with shortness of breath with minimal activity.    Paroxysmal atrial fibrillation (CMS/HCC)  Status post ablation, anticoagulated with Eliquis.  In sinus rhythm.    CKD (chronic kidney disease) stage 4, GFR 15-29 ml/min (CMS/[facility])  Followed by nephrology, her most recent creatinine was about 2.2.    Long term (current) use of anticoagulants  Watchman could be considered, but first we need to exclude amyloid.        Follow-up: 6 months or sooner as needed      Arthur Garrett MD, FACC

## 2023-09-21 ENCOUNTER — READMISSION MANAGEMENT (OUTPATIENT)
Dept: CALL CENTER | Facility: HOSPITAL | Age: 88
End: 2023-09-21
Payer: MEDICARE

## 2023-09-21 NOTE — OUTREACH NOTE
General Surgery Week 3 Survey      Flowsheet Row Responses   [facility] patient discharged from? [facility]   Does the patient have one of the following disease processes/diagnoses(primary or secondary)? General Surgery   Week 3 attempt successful? No   Unsuccessful attempts Attempt 1            Shannan POTTS - Registered Nurse

## 2023-09-25 ENCOUNTER — OFFICE VISIT (OUTPATIENT)
Dept: CARDIOLOGY | Facility: CLINIC | Age: 88
End: 2023-09-25
Payer: MEDICARE

## 2023-09-25 ENCOUNTER — CLINICAL SUPPORT NO REQUIREMENTS (OUTPATIENT)
Dept: CARDIOLOGY | Facility: CLINIC | Age: 88
End: 2023-09-25

## 2023-09-25 VITALS
HEART RATE: 60 BPM | HEIGHT: 64 IN | DIASTOLIC BLOOD PRESSURE: 76 MMHG | SYSTOLIC BLOOD PRESSURE: 128 MMHG | BODY MASS INDEX: 30.22 KG/M2 | WEIGHT: 177 LBS

## 2023-09-25 DIAGNOSIS — I45.3 TRIFASCICULAR BLOCK: ICD-10-CM

## 2023-09-25 DIAGNOSIS — I44.2 AV BLOCK, COMPLETE: Primary | ICD-10-CM

## 2023-09-25 DIAGNOSIS — G47.33 OSA (OBSTRUCTIVE SLEEP APNEA): ICD-10-CM

## 2023-09-25 DIAGNOSIS — I27.20 PULMONARY HYPERTENSION: ICD-10-CM

## 2023-09-25 DIAGNOSIS — I47.10 PAROXYSMAL SVT (SUPRAVENTRICULAR TACHYCARDIA): Primary | ICD-10-CM

## 2023-09-25 DIAGNOSIS — I10 ESSENTIAL HYPERTENSION: Chronic | ICD-10-CM

## 2023-09-25 DIAGNOSIS — I51.89 DIASTOLIC DYSFUNCTION: Chronic | ICD-10-CM

## 2023-09-25 DIAGNOSIS — I71.21 ANEURYSM OF ASCENDING AORTA WITHOUT RUPTURE: Chronic | ICD-10-CM

## 2023-09-25 NOTE — PROGRESS NOTES
Date of Office Visit: 2023  Encounter Provider: Mary Grace Culp MD  Place of Service: [facility] CARDIOLOGY  Patient Name: Brittany Villeda  :1935    Chief complaint  Paroxysmal atrial fibrillation, thoracic aortic aneurysm    History of Present Illness  The patient is a pleasant, 88-year-old female with a history of hypertension with hypertensive heart disease, obstructive sleep apnea (on BiPAP) obesity, immobility, pulmonary hypertension, history of nonsustained ventricular tachycardia, and obstructive sleep apnea.  She has a history of diastolic dysfunction, nonsustained ventricular tachycardia, atrial fibrillation and bradycardia.  She had a stress perfusion study that was negative in 2019.  She eventually underwent pacemaker placement 2018.  Echo 2021 with ejection fraction 60% with mild RV dysfunction negative saline injection with severe biatrial enlargement and no significant valvular heart disease.  RVSP was 38 mmHg.  Last CT angiogram of the chest was on 2023 when she was in the emergency room for pleuritic chest pain.  Not show pulmonary emboli.  It was a limited evaluation of the aorta.  Previously noted to be 4.1 cm aorta.  The aorta was reviewed with the aortic root measuring 3.6 cm acing aorta measuring 3.7 cm and dilated hepatic veins consistent with right-sided heart failure were noted.  On 2023 she had a follow-up echocardiogram that showed an ejection fraction 60% with indeterminate diastolic function, moderate severe atrial enlargement, mild mitral valve regurgitation, moderate tricuspid regurgitation with RVSP 39 mmHg.  The ascending aorta measured 3.8 cm arch measured 3.9 cm.    Since last visit patient is walking she denies any palpitation shortness of breath dizziness or chest pain.  She has mild chronic dependent edema.  She is not taking Lasix.  Patient has been referred to ENT by PCP.    Past Medical History:   Diagnosis Date     Abdominal aortic aneurysm     Anemia     Arrhythmia     Arthritis     Asthma     Bradycardia     Choledocholithiasis 05/09/2021    Colitis     Diastolic dysfunction     Essential hypertension 05/12/2016    GERD (gastroesophageal reflux disease)     Heart block     Hypertension     Hypertensive heart disease     Hyperthyroidism     REPORTS ENLARGED    Inguinal hernia     Kidney stone     Leukopenia     MGUS (monoclonal gammopathy of unknown significance)     Nephrolithiasis     Pacemaker     Paroxysmal atrial fibrillation     Peptic ulceration     Pressure ulcer     REPORTS ON COCCYX. INSTR TO NOTIFY DR BAIRES'S OFFICE    PSVT (paroxysmal supraventricular tachycardia)     Pulmonary hypertension     Rectal bleed     Sleep apnea     NO DEVICE    Subdural hematoma     Systemic hypertension     Trifascicular block     Ulcerative rectosigmoiditis without complication     Ventricular tachycardia     nonsustained     Past Surgical History:   Procedure Laterality Date    BACK SURGERY      lumbar fusion    DUSTY HOLE Left 08/08/2021    Procedure: Left-sided dusty holes for evacuation of subdural hematoma;  Surgeon: Gustavo Callaway MD;  Location: [facility] MAIN OR;  Service: Neurosurgery;  Laterality: Left;    CARDIAC ELECTROPHYSIOLOGY PROCEDURE N/A 11/07/2018    Procedure: Pacemaker DC new   [place];  Surgeon: Jesus Granda MD;  Location: [facility] CATH INVASIVE LOCATION;  Service: Cardiology    CHOLECYSTECTOMY      COLONOSCOPY  06/16/2014    colitis, cryptitis,  tics, NBIH, TA w/low grade dysplasia    COLONOSCOPY N/A 10/28/2019    Procedure: COLONOSCOPY WITH COLD AND HOT POLYPECTOMIES;  Surgeon: Micaela English MD;  Location: [facility] ENDOSCOPY;  Service: Gastroenterology    ENDOSCOPY N/A 05/13/2021    Procedure: ESOPHAGOGASTRODUODENOSCOPY with BX;  Surgeon: Stefan Mcfadden MD;  Location: [facility] ENDOSCOPY;  Service: Gastroenterology;  Laterality: N/A;  EPIGASTRIC PAIN  --DUODENAL ULCER, HEMORRHAGIC GASTRITIS, HIATAL  HERNIA     ENDOSCOPY N/A 10/11/2022    Procedure: ESOPHAGOGASTRODUODENOSCOPY;  Surgeon: Micaela English MD;  Location:  [facility] ENDOSCOPY;  Service: Gastroenterology;  Laterality: N/A;  PRE- MELENA  POST- ESOPHAGITIS    HEMORRHOIDECTOMY      HERNIA REPAIR      umbilical    HYSTERECTOMY      INGUINAL HERNIA REPAIR Left 9/1/2023    Procedure: INGUINAL HERNIA REPAIR LEFT;  Surgeon: Antonio Foster MD;  Location:  [facility] [place];  Service: General;  Laterality: Left;    JOINT REPLACEMENT Bilateral     KNEES    MYOMECTOMY      PACEMAKER IMPLANTATION      SHOULDER SURGERY      SIGMOIDOSCOPY N/A 11/02/2022    Procedure: SIGMOIDOSCOPY FLEXIBLE WITH COLD BIOPSIES AND ASPIRATE;  Surgeon: Micaela English MD;  Location:  [facility] ENDOSCOPY;  Service: Gastroenterology;  Laterality: N/A;  PRE- RECTAL BLEEDING  POST- HEMORRHOIDS, DIVERTICULOSIS, COLITIS    SINUS SURGERY      TOE NAIL AMPUTATION  03/04/2019    TONSILLECTOMY       Outpatient Medications Prior to Visit   Medication Sig Dispense Refill    acetaminophen (TYLENOL) 500 MG tablet Take 1 tablet by mouth Every 6 (Six) Hours As Needed for Mild Pain .      albuterol sulfate  (90 Base) MCG/ACT inhaler Inhale 2 puffs Every 4 (Four) Hours As Needed for Wheezing (or cough). 18 g 0    ELDERBERRY PO Take 2 tablets by mouth Daily. HOLDING FOR DOS      furosemide (LASIX) 20 MG tablet Take 1 tablet by mouth Daily As Needed.      Magnesium Oxide 400 (240 Mg) MG tablet TAKE 1 TABLET EVERY DAY (Patient taking differently: Take 1 tablet by mouth Daily. HOLDING FOR DOS) 90 tablet 1    mesalamine (APRISO) 0.375 g 24 hr capsule TAKE 4 CAPSULES EVERY DAY (Patient taking differently: Take 4 g by mouth Daily.) 360 capsule 3    pantoprazole (PROTONIX) 40 MG EC tablet Take 1 tablet by mouth Daily. 90 tablet 1    potassium chloride (K-DUR,KLOR-CON) 10 MEQ CR tablet TAKE 1 TABLET EVERY DAY 90 tablet 1    psyllium (METAMUCIL) 58.6 % packet Take 1 packet by mouth Daily.      vitamin B-12  (CYANOCOBALAMIN) 1000 MCG tablet TAKE 1 TABLET EVERY DAY (Patient taking differently: Take 0.5 tablets by mouth Daily. HOLDING FOR DOS) 90 tablet 1     No facility-administered medications prior to visit.       Allergies as of 2023 - Reviewed 2023   Allergen Reaction Noted    Codeine Hallucinations 2017    Amitriptyline Rash 2016    Amoxicillin-pot clavulanate Rash 2016    Aspirin Unknown - Low Severity 2016    Carisoprodol-aspirin-codeine Palpitations 2016    Iodinated contrast media Rash 2016    Latex Rash 2016    Naproxen Rash 2016    Nsaids Unknown - Low Severity 2016    Soma compound with codeine [carisoprodol-aspirin-codeine] Rash 2016    Sulfa antibiotics Rash 2016    Tramadol Palpitations 2019     Social History     Socioeconomic History    Marital status:     Number of children: 10    Years of education: High School   Tobacco Use    Smoking status: Former     Packs/day: 1.50     Years: 10.00     Pack years: 15.00     Types: Cigarettes     Start date:      Quit date:      Years since quittin.7    Smokeless tobacco: Never    Tobacco comments:     QUIT SMOKING    Vaping Use    Vaping Use: Never used   Substance and Sexual Activity    Alcohol use: No     Comment: caffeine - decaf coffee    Drug use: Never    Sexual activity: Defer     Family History   Problem Relation Age of Onset    Diabetes Mother     Breast cancer Sister     Kidney cancer Sister     Heart disease Sister     Prostate cancer Brother     Prostate cancer Brother     Prostate cancer Brother     Malig Hyperthermia Neg Hx      Review of Systems   Constitutional: Negative for chills, fever, weight gain and weight loss.   Cardiovascular:  Positive for leg swelling.   Respiratory:  Negative for cough, snoring and wheezing.    Hematologic/Lymphatic: Negative for bleeding problem. Does not bruise/bleed easily.   Skin:  Negative for color change.  "  Musculoskeletal:  Negative for falls, joint pain and myalgias.   Gastrointestinal:  Negative for melena.   Genitourinary:  Negative for hematuria.   Neurological:  Negative for excessive daytime sleepiness.   Psychiatric/Behavioral:  Negative for depression. The patient is not nervous/anxious.       Objective:     Vitals:    09/25/23 0848   BP: 128/76   Pulse: 60   Weight: 80.3 kg (177 lb)   Height: 162.6 cm (64\")     Body mass index is 30.38 kg/m².    Vitals reviewed.   Constitutional:       Appearance: Well-developed.   Eyes:      General: No scleral icterus.        Right eye: No discharge.      Conjunctiva/sclera: Conjunctivae normal.      Pupils: Pupils are equal, round, and reactive to light.   HENT:      Head: Normocephalic.      Nose: Nose normal.   Neck:      Thyroid: No thyromegaly.      Vascular: No JVD.   Pulmonary:      Effort: Pulmonary effort is normal. No respiratory distress.      Breath sounds: Normal breath sounds. No wheezing. No rales.   Cardiovascular:      Normal rate. Regular rhythm. Normal S1. Normal S2.       Murmurs: There is no murmur.      No gallop.    Pulses:     Intact distal pulses.      Carotid: 2+ bilaterally.     Radial: 2+ bilaterally.     Femoral: 2+ bilaterally.     Popliteal: 2+ bilaterally.     Dorsalis pedis: 2+ bilaterally.     Posterior tibial: 2+ bilaterally.  Edema:     Peripheral edema present.     Ankle: bilateral trace edema of the ankle.  Abdominal:      General: Bowel sounds are normal. There is no distension.      Palpations: Abdomen is soft.      Tenderness: There is no abdominal tenderness. There is no rebound.   Musculoskeletal: Normal range of motion.         General: No tenderness.      Cervical back: Normal range of motion and neck supple. Skin:     General: Skin is warm and dry.      Findings: No erythema or rash.   Neurological:      Mental Status: Alert and oriented to person, place, and time.   Psychiatric:         Behavior: Behavior normal.         " Thought Content: Thought content normal.         Judgment: Judgment normal.     Lab Review:   Lab Results - Last 18 Months   Lab Units 08/21/23  1608 04/15/23  1818 03/13/23  1130 09/26/22  1159 06/28/22  1451   WBC 10*3/mm3 3.77 2.86* 3.66   < > 4.1   RBC 10*6/mm3 3.86 3.50* 3.79   < > 3.98   HEMOGLOBIN g/dL 13.1 11.6* 12.2   < > 12.2   HEMATOCRIT % 39.4 34.1 36.5   < > 37.5   MCV fL 102.1* 97.4* 96.3   < > 94   MCH pg 33.9* 33.1* 32.2   < > 30.7   MCHC g/dL 33.2 34.0 33.4   < > 32.5   RDW % 13.2 13.2 12.3   < > 14.1   PLATELETS 10*3/mm3 120* 106* 126*   < > 132*   NEUTROPHIL % % 44.9  --  39.0*   < > 25   LYMPHOCYTE % % 43.8  --  52.5*   < > 61   MONOCYTES % % 10.3  --  6.6   < > 11   EOSINOPHIL % % 0.5  --  1.1   < > 2   BASOPHIL % % 0.5  --  0.5   < > 1   NEUTROS ABS 10*3/mm3 1.69* 1.04* 1.43*   < > 1.0*   LYMPHS ABS 10*3/mm3 1.65  --  1.92   < > 2.5   MONOS ABS 10*3/mm3 0.39  --  0.24   < > 0.4   EOS ABS 10*3/mm3 0.02  --  0.04   < > 0.1   EOSINOPHIL ABS   --   --   --    < >  --    BASOS ABS 10*3/mm3 0.02  --  0.02   < > 0.0   IMM GRAN % %  --   --  0.3  --  0   IMMATURE GRANS (ABS) 10*3/mm3  --   --  0.01  --  0.0   RDW-SD fl 49.0 46.5  --    < >  --    MPV fL 9.0 9.3  --    < >  --     < > = values in this interval not displayed.       Lab Results - Last 18 Months   Lab Units 08/21/23  1608 04/15/23  1818   GLUCOSE mg/dL 102* 103*   BUN mg/dL 16 15   CREATININE mg/dL 0.80 0.81   SODIUM mmol/L 146* 141   POTASSIUM mmol/L 5.5* 4.5   CHLORIDE mmol/L 111* 107   CO2 mmol/L 27.0 27.0   CALCIUM mg/dL 10.9* 10.2   TOTAL PROTEIN g/dL 7.0 7.3   ALBUMIN g/dL 4.0 3.9   ALT (SGPT) U/L 11 11   AST (SGOT) U/L 14 16   ALK PHOS U/L 66 71   BILIRUBIN mg/dL 1.1 1.3*   GLOBULIN gm/dL 3.0 3.4   A/G RATIO g/dL 1.3 1.1   BUN / CREAT RATIO  20.0 18.5   ANION GAP mmol/L 8.0 7.0   EGFR mL/min/1.73 71.0 69.9       Lab Results - Last 18 Months   Lab Units 04/15/23  2237 04/15/23  1818   HSTROP T ng/L 22* 25*     Lab Results - Last 18  Months   Lab Units 04/25/22  1641   TSH uIU/mL 0.854     Lab Results - Last 18 Months   Lab Units 11/02/22  0233   PROTIME Seconds 15.3*   APTT seconds 38.3*       ECG 12 Lead    Date/Time: 9/25/2023 8:59 AM  Performed by: Mary Grace Culp MD  Authorized by: Mary Grace Culp MD           Diagnosis Plan   1. Paroxysmal SVT (supraventricular tachycardia)        2. Trifascicular block        3. Essential hypertension        4. Diastolic dysfunction        5. Aneurysm of ascending aorta without rupture        6. Pulmonary hypertension        7. HUGO (obstructive sleep apnea)          Plan:       1.  Persistent atrial fibrillation with reasonable rate control.  She is felt to be poor candidate for anticoagulation due to history of GI bleed and decreased mobility.  2.  Mild dependent edema.  Continue with minimal use of diuretics.  3.  Status post permanent pacemaker placement 2018.  Continue routine device checks  4.  History of diastolic dysfunction.  Clinically compensated without overt heart failure  5.  History of hyperkalemia, I recommended low potassium diet and will follow-up with Dr. Walter.  6.  Dilated asending aorta.  Ascending aorta measured 3.7 cm by CT 4/2023.  7.  History of RV dysfunction and pulmonary hypertension now with.  Evidence of right-sided heart failure on CT scan earlier this year.  Most recent echo 8/2023 with improved RV function.  8.  Untreated HUGO, untreated  9.  Rectal bleeding with hemorroids.  No recent events  10.  Mediastinal adenopathy.  Followed by Dr. Esparza  11.  Debilitated state.  Slowly ambulating around her home.  12.  Thyromegaly with tracheal mass effect.  Additional recommendations per Dr. Esparza      Time Spent: I spent 25 minutes caring for Brittany on this date of service. This time includes time spent by me in the following activities: preparing for the visit, reviewing tests, obtaining and/or reviewing a separately obtained history, performing a medically appropriate examination and/or  evaluation, counseling and educating the patient/family/caregiver, documenting information in the medical record, and independently interpreting results and communicating that information with the patient/family/caregiver.   I spent 1 minutes on the separately reported service of ECG. This time is not included in the time used to support the E/M service also reported today.        Your medication list            Accurate as of September 25, 2023 11:59 PM. If you have any questions, ask your nurse or doctor.                CHANGE how you take these medications        Instructions Last Dose Given Next Dose Due   Magnesium Oxide -Mg Supplement 400 (240 Mg) MG tablet  What changed: additional instructions      TAKE 1 TABLET EVERY DAY       mesalamine 0.375 g 24 hr capsule  Commonly known as: APRISO  What changed: See the new instructions.      TAKE 4 CAPSULES EVERY DAY       vitamin B-12 1000 MCG tablet  Commonly known as: CYANOCOBALAMIN  What changed:   how much to take  additional instructions      TAKE 1 TABLET EVERY DAY              CONTINUE taking these medications        Instructions Last Dose Given Next Dose Due   acetaminophen 500 MG tablet  Commonly known as: TYLENOL      Take 1 tablet by mouth Every 6 (Six) Hours As Needed for Mild Pain .       albuterol sulfate  (90 Base) MCG/ACT inhaler  Commonly known as: PROVENTIL HFA;VENTOLIN HFA;PROAIR HFA      Inhale 2 puffs Every 4 (Four) Hours As Needed for Wheezing (or cough).       ELDERBERRY PO      Take 2 tablets by mouth Daily. HOLDING FOR DOS       furosemide 20 MG tablet  Commonly known as: LASIX      Take 1 tablet by mouth Daily As Needed.       pantoprazole 40 MG EC tablet  Commonly known as: PROTONIX      Take 1 tablet by mouth Daily.       potassium chloride 10 MEQ CR tablet  Commonly known as: K-DUR,KLOR-CON      TAKE 1 TABLET EVERY DAY       psyllium 58.6 % packet  Commonly known as: METAMUCIL      Take 1 packet by mouth Daily.                 Patient is no longer taking -.  I corrected the med list to reflect this.  I did not stop these medications.      Dictated utilizing Dragon dictation

## 2023-10-03 NOTE — TELEPHONE ENCOUNTER
Rx Refill Note  Requested Prescriptions     Pending Prescriptions Disp Refills    vitamin B-12 (CYANOCOBALAMIN) 1000 MCG tablet 90 tablet 1     Sig: Take 1 tablet by mouth Daily.      Last office visit with prescribing clinician: 9/8/2023   Last telemedicine visit with prescribing clinician: Visit date not found   Next office visit with prescribing clinician: 10/23/2023                         Would you like a call back once the refill request has been completed: [] Yes [] No    If the office needs to give you a call back, can they leave a voicemail: [] Yes [] No    Gordon Monroy  10/03/23, 12:58 EDT

## 2023-10-04 RX ORDER — LANOLIN ALCOHOL/MO/W.PET/CERES
1000 CREAM (GRAM) TOPICAL DAILY
Qty: 90 TABLET | Refills: 1 | Status: SHIPPED | OUTPATIENT
Start: 2023-10-04

## 2023-10-12 ENCOUNTER — TELEPHONE (OUTPATIENT)
Dept: FAMILY MEDICINE CLINIC | Facility: CLINIC | Age: 88
End: 2023-10-12
Payer: MEDICARE

## 2023-10-12 NOTE — TELEPHONE ENCOUNTER
Called pt and left VM to reschedule appt Dr. Esparza will be out of the office on 10/23/23. Hub to relay ok to schedule for next available appt

## 2023-10-13 ENCOUNTER — CLINICAL SUPPORT (OUTPATIENT)
Dept: ORTHOPEDIC SURGERY | Facility: CLINIC | Age: 88
End: 2023-10-13
Payer: MEDICARE

## 2023-10-13 VITALS — WEIGHT: 178.8 LBS | HEIGHT: 64 IN | TEMPERATURE: 98.2 F | BODY MASS INDEX: 30.52 KG/M2

## 2023-10-13 DIAGNOSIS — M19.019 SHOULDER ARTHRITIS: Primary | ICD-10-CM

## 2023-10-13 RX ORDER — LIDOCAINE HYDROCHLORIDE 10 MG/ML
2 INJECTION, SOLUTION EPIDURAL; INFILTRATION; INTRACAUDAL; PERINEURAL
Status: COMPLETED | OUTPATIENT
Start: 2023-10-13 | End: 2023-10-13

## 2023-10-13 RX ORDER — METHYLPREDNISOLONE ACETATE 80 MG/ML
80 INJECTION, SUSPENSION INTRA-ARTICULAR; INTRALESIONAL; INTRAMUSCULAR; SOFT TISSUE
Status: COMPLETED | OUTPATIENT
Start: 2023-10-13 | End: 2023-10-13

## 2023-10-13 RX ADMIN — METHYLPREDNISOLONE ACETATE 80 MG: 80 INJECTION, SUSPENSION INTRA-ARTICULAR; INTRALESIONAL; INTRAMUSCULAR; SOFT TISSUE at 09:41

## 2023-10-13 RX ADMIN — LIDOCAINE HYDROCHLORIDE 2 ML: 10 INJECTION, SOLUTION EPIDURAL; INFILTRATION; INTRACAUDAL; PERINEURAL at 09:41

## 2023-10-13 NOTE — PROGRESS NOTES
Ms. Villeda would like to get repeat injections today.  The risks, benefits and alternatives were discussed and the patient consented.  Going forward, the patient will follow-up as needed.    Wes Roldan MD    10/13/2023         Large Joint Arthrocentesis: R subacromial bursa  Date/Time: 10/13/2023 9:41 AM  Consent given by: patient  Site marked: site marked  Timeout: Immediately prior to procedure a time out was called to verify the correct patient, procedure, equipment, support staff and site/side marked as required   Supporting Documentation  Indications: pain   Procedure Details  Location: shoulder - R subacromial bursa  Preparation: Patient was prepped and draped in the usual sterile fashion  Needle gauge: 21 G.  Approach: posterior  Medications administered: 2 mL lidocaine PF 1% 1 %; 80 mg methylPREDNISolone acetate 80 MG/ML  Patient tolerance: patient tolerated the procedure well with no immediate complications      Large Joint Arthrocentesis: L subacromial bursa  Date/Time: 10/13/2023 9:41 AM  Consent given by: patient  Site marked: site marked  Timeout: Immediately prior to procedure a time out was called to verify the correct patient, procedure, equipment, support staff and site/side marked as required   Supporting Documentation  Indications: pain   Procedure Details  Location: shoulder - L subacromial bursa  Preparation: Patient was prepped and draped in the usual sterile fashion  Needle gauge: 21 G.  Approach: posterior  Medications administered: 2 mL lidocaine PF 1% 1 %; 80 mg methylPREDNISolone acetate 80 MG/ML  Patient tolerance: patient tolerated the procedure well with no immediate complications

## 2023-11-13 ENCOUNTER — OFFICE VISIT (OUTPATIENT)
Dept: FAMILY MEDICINE CLINIC | Facility: CLINIC | Age: 88
End: 2023-11-13
Payer: MEDICARE

## 2023-11-13 VITALS
SYSTOLIC BLOOD PRESSURE: 119 MMHG | HEIGHT: 64 IN | HEART RATE: 60 BPM | OXYGEN SATURATION: 96 % | TEMPERATURE: 97.7 F | BODY MASS INDEX: 30.32 KG/M2 | WEIGHT: 177.6 LBS | DIASTOLIC BLOOD PRESSURE: 65 MMHG

## 2023-11-13 DIAGNOSIS — Z23 INFLUENZA VACCINE ADMINISTERED: ICD-10-CM

## 2023-11-13 DIAGNOSIS — J30.9 ALLERGIC RHINITIS, UNSPECIFIED SEASONALITY, UNSPECIFIED TRIGGER: Primary | ICD-10-CM

## 2023-11-13 DIAGNOSIS — Z86.79 HISTORY OF ATRIAL FIBRILLATION: ICD-10-CM

## 2023-11-13 DIAGNOSIS — R60.9 DEPENDENT EDEMA: ICD-10-CM

## 2023-11-13 DIAGNOSIS — I51.89 DIASTOLIC DYSFUNCTION: Chronic | ICD-10-CM

## 2023-11-13 NOTE — PROGRESS NOTES
"Chief Complaint  URI (Coughing phelm mainly at night for 3 weeks )    Subjective        Brittany Villeda presents to [facility]  History of Present Illness    6-month follow-up.  No particular complaints.  She uses occasional furosemide for lower extremity edema.  She has a history of diastolic dysfunction remotely.  However no dyspnea.  No pulmonary symptoms otherwise.  No cardiovascular symptoms.  She has some postnasal drip at nighttime that bothers her and she will start sneezing and may be some coughing.  No hemoptysis.  No fever.  Feels fine overall.    Objective   Vital Signs:  /65   Pulse 60   Temp 97.7 °F (36.5 °C) (Temporal)   Ht 162.6 cm (64.02\")   Wt 80.6 kg (177 lb 9.6 oz)   SpO2 96%   BMI 30.47 kg/m²   Estimated body mass index is 30.47 kg/m² as calculated from the following:    Height as of this encounter: 162.6 cm (64.02\").    Weight as of this encounter: 80.6 kg (177 lb 9.6 oz).               Physical Exam  Vitals and nursing note reviewed.   Constitutional:       General: She is not in acute distress.     Appearance: She is well-developed.   HENT:      Mouth/Throat:      Mouth: Mucous membranes are moist.      Pharynx: Oropharynx is clear. No oropharyngeal exudate or posterior oropharyngeal erythema.   Neck:      Comments: The neck exam is unremarkable.  No palpable mass.  Thyroid exam unremarkable.  Cardiovascular:      Rate and Rhythm: Normal rate and regular rhythm.      Heart sounds: Normal heart sounds.   Pulmonary:      Effort: Pulmonary effort is normal.      Breath sounds: Normal breath sounds.   Skin:     General: Skin is warm and dry.   Psychiatric:         Mood and Affect: Mood normal.        Result Review :  The following data was reviewed by: Mega Esparza MD on 11/13/2023:  Common labs          3/13/2023    11:30 4/15/2023    18:18 8/21/2023    16:08   Common Labs   Glucose 99  103  102    BUN 13  15  16    Creatinine 0.98  0.81  0.80  "   Sodium 141  141  146    Potassium 4.9  4.5  5.5    Chloride 107  107  111    Calcium 11.1  10.2  10.9    Total Protein 7.7      Albumin 4.3  3.9  4.0    Total Bilirubin 1.4  1.3  1.1    Alkaline Phosphatase 71  71  66    AST (SGOT) 9  16  14    ALT (SGPT) 8  11  11    WBC 3.66  2.86  3.77    Hemoglobin 12.2  11.6  13.1    Hematocrit 36.5  34.1  39.4    Platelets 126  106  120                   Assessment and Plan   Diagnoses and all orders for this visit:    1. Allergic rhinitis, unspecified seasonality, unspecified trigger (Primary)    2. Influenza vaccine administered  -     Fluzone High-Dose 65+yrs    3. Diastolic dysfunction  -     CBC & Differential  -     Comprehensive Metabolic Panel    4. History of atrial fibrillation  -     CBC & Differential  -     Comprehensive Metabolic Panel    5. Dependent edema  -     CBC & Differential  -     Comprehensive Metabolic Panel      Nighttime postnasal drip.  With sneezing.  Very likely allergic rhinitis or other allergy symptoms.  I do not recommend specific therapy at this time.  Feeling much worse she will let me know.  I will see her back in 6 months for annual Medicare wellness visit.  Sooner as needed.         Follow Up   No follow-ups on file.  Patient was given instructions and counseling regarding her condition or for health maintenance advice. Please see specific information pulled into the AVS if appropriate.

## 2023-11-14 LAB
ALBUMIN SERPL-MCNC: 4.3 G/DL (ref 3.5–5.2)
ALBUMIN/GLOB SERPL: 1.5 G/DL
ALP SERPL-CCNC: 73 U/L (ref 39–117)
ALT SERPL-CCNC: 8 U/L (ref 1–33)
AST SERPL-CCNC: 15 U/L (ref 1–32)
BASOPHILS # BLD AUTO: ABNORMAL 10*3/UL
BASOPHILS # BLD MANUAL: 0.03 10*3/MM3 (ref 0–0.2)
BASOPHILS NFR BLD MANUAL: 1 % (ref 0–1.5)
BILIRUB SERPL-MCNC: 1.2 MG/DL (ref 0–1.2)
BUN SERPL-MCNC: 12 MG/DL (ref 8–23)
BUN/CREAT SERPL: 13.8 (ref 7–25)
CALCIUM SERPL-MCNC: 10.8 MG/DL (ref 8.6–10.5)
CHLORIDE SERPL-SCNC: 105 MMOL/L (ref 98–107)
CO2 SERPL-SCNC: 30.6 MMOL/L (ref 22–29)
CREAT SERPL-MCNC: 0.87 MG/DL (ref 0.57–1)
DIFFERENTIAL COMMENT: ABNORMAL
EGFRCR SERPLBLD CKD-EPI 2021: 64.2 ML/MIN/1.73
EOSINOPHIL # BLD AUTO: ABNORMAL 10*3/UL
EOSINOPHIL NFR BLD AUTO: ABNORMAL %
ERYTHROCYTE [DISTWIDTH] IN BLOOD BY AUTOMATED COUNT: 11.7 % (ref 12.3–15.4)
GLOBULIN SER CALC-MCNC: 2.9 GM/DL
GLUCOSE SERPL-MCNC: 100 MG/DL (ref 65–99)
HCT VFR BLD AUTO: 39.9 % (ref 34–46.6)
HGB BLD-MCNC: 13.2 G/DL (ref 12–15.9)
LYMPHOCYTES # BLD AUTO: ABNORMAL 10*3/UL
LYMPHOCYTES # BLD MANUAL: 1.63 10*3/MM3 (ref 0.7–3.1)
LYMPHOCYTES NFR BLD AUTO: ABNORMAL %
LYMPHOCYTES NFR BLD MANUAL: 56.3 % (ref 19.6–45.3)
MCH RBC QN AUTO: 33.2 PG (ref 26.6–33)
MCHC RBC AUTO-ENTMCNC: 33.1 G/DL (ref 31.5–35.7)
MCV RBC AUTO: 100.5 FL (ref 79–97)
MONOCYTES # BLD MANUAL: 0.24 10*3/MM3 (ref 0.1–0.9)
MONOCYTES NFR BLD AUTO: ABNORMAL %
MONOCYTES NFR BLD MANUAL: 8.3 % (ref 5–12)
NEUTROPHILS # BLD MANUAL: 0.99 10*3/MM3 (ref 1.7–7)
NEUTROPHILS NFR BLD AUTO: ABNORMAL %
NEUTROPHILS NFR BLD MANUAL: 34.4 % (ref 42.7–76)
PLATELET # BLD AUTO: 138 10*3/MM3 (ref 140–450)
PLATELET BLD QL SMEAR: ABNORMAL
POTASSIUM SERPL-SCNC: 4.5 MMOL/L (ref 3.5–5.2)
PROT SERPL-MCNC: 7.2 G/DL (ref 6–8.5)
RBC # BLD AUTO: 3.97 10*6/MM3 (ref 3.77–5.28)
RBC MORPH BLD: ABNORMAL
SODIUM SERPL-SCNC: 143 MMOL/L (ref 136–145)
WBC # BLD AUTO: 2.89 10*3/MM3 (ref 3.4–10.8)

## 2023-11-20 RX ORDER — MESALAMINE 0.38 G/1
CAPSULE, EXTENDED RELEASE ORAL
Qty: 360 CAPSULE | Refills: 4 | Status: SHIPPED | OUTPATIENT
Start: 2023-11-20

## 2023-12-11 RX ORDER — CLONAZEPAM 0.5 MG/1
0.5 TABLET ORAL 2 TIMES DAILY PRN
Qty: 10 TABLET | Refills: 0 | Status: SHIPPED | OUTPATIENT
Start: 2023-12-11

## 2024-01-12 ENCOUNTER — CLINICAL SUPPORT (OUTPATIENT)
Dept: ORTHOPEDIC SURGERY | Facility: CLINIC | Age: 89
End: 2024-01-12
Payer: MEDICARE

## 2024-01-12 VITALS — WEIGHT: 186.8 LBS | TEMPERATURE: 97.8 F | BODY MASS INDEX: 31.89 KG/M2 | HEIGHT: 64 IN

## 2024-01-12 DIAGNOSIS — M19.019 SHOULDER ARTHRITIS: Primary | ICD-10-CM

## 2024-01-12 RX ORDER — METHYLPREDNISOLONE ACETATE 80 MG/ML
80 INJECTION, SUSPENSION INTRA-ARTICULAR; INTRALESIONAL; INTRAMUSCULAR; SOFT TISSUE
Status: COMPLETED | OUTPATIENT
Start: 2024-01-12 | End: 2024-01-12

## 2024-01-12 RX ORDER — LIDOCAINE HYDROCHLORIDE 10 MG/ML
2 INJECTION, SOLUTION EPIDURAL; INFILTRATION; INTRACAUDAL; PERINEURAL
Status: COMPLETED | OUTPATIENT
Start: 2024-01-12 | End: 2024-01-12

## 2024-01-12 RX ADMIN — LIDOCAINE HYDROCHLORIDE 2 ML: 10 INJECTION, SOLUTION EPIDURAL; INFILTRATION; INTRACAUDAL; PERINEURAL at 09:18

## 2024-01-12 RX ADMIN — METHYLPREDNISOLONE ACETATE 80 MG: 80 INJECTION, SUSPENSION INTRA-ARTICULAR; INTRALESIONAL; INTRAMUSCULAR; SOFT TISSUE at 09:18

## 2024-01-12 NOTE — PROGRESS NOTES
Ms. Villeda comes in today for follow-up.  Injections have worked well in the past.  The patient would like to get repeat injections today.  The risks, benefits and alternatives were discussed and the patient consented.  Going forward, the patient will follow-up with Mary for future injections.    Wes Roldan MD    01/12/2024     Large Joint Arthrocentesis: R subacromial bursa  Date/Time: 1/12/2024 9:18 AM  Consent given by: patient  Site marked: site marked  Timeout: Immediately prior to procedure a time out was called to verify the correct patient, procedure, equipment, support staff and site/side marked as required   Supporting Documentation  Indications: pain   Procedure Details  Location: shoulder - R subacromial bursa  Preparation: Patient was prepped and draped in the usual sterile fashion  Needle gauge: 21G.  Approach: posterior  Medications administered: 80 mg methylPREDNISolone acetate 80 MG/ML; 2 mL lidocaine PF 1% 1 %  Patient tolerance: patient tolerated the procedure well with no immediate complications      Large Joint Arthrocentesis: L subacromial bursa  Date/Time: 1/12/2024 9:18 AM  Consent given by: patient  Site marked: site marked  Timeout: Immediately prior to procedure a time out was called to verify the correct patient, procedure, equipment, support staff and site/side marked as required   Supporting Documentation  Indications: pain   Procedure Details  Location: shoulder - L subacromial bursa  Preparation: Patient was prepped and draped in the usual sterile fashion  Needle gauge: 21G.  Approach: posterior  Medications administered: 80 mg methylPREDNISolone acetate 80 MG/ML; 2 mL lidocaine PF 1% 1 %  Patient tolerance: patient tolerated the procedure well with no immediate complications

## 2024-02-01 ENCOUNTER — OFFICE VISIT (OUTPATIENT)
Dept: OBSTETRICS AND GYNECOLOGY | Facility: CLINIC | Age: 89
End: 2024-02-01
Payer: MEDICARE

## 2024-02-01 VITALS
WEIGHT: 186 LBS | DIASTOLIC BLOOD PRESSURE: 70 MMHG | HEIGHT: 64 IN | SYSTOLIC BLOOD PRESSURE: 118 MMHG | BODY MASS INDEX: 31.76 KG/M2

## 2024-02-01 DIAGNOSIS — R10.2 VULVAR PAIN: Primary | ICD-10-CM

## 2024-02-01 DIAGNOSIS — Z13.89 SCREENING FOR GENITOURINARY CONDITION: ICD-10-CM

## 2024-02-01 LAB
BILIRUB BLD-MCNC: NEGATIVE MG/DL
CLARITY, POC: CLEAR
COLOR UR: YELLOW
GLUCOSE UR STRIP-MCNC: NEGATIVE MG/DL
KETONES UR QL: NEGATIVE
LEUKOCYTE EST, POC: NEGATIVE
NITRITE UR-MCNC: NEGATIVE MG/ML
PH UR: 5 [PH] (ref 5–8)
PROT UR STRIP-MCNC: NEGATIVE MG/DL
RBC # UR STRIP: NEGATIVE /UL
SP GR UR: 1 (ref 1–1.03)
UROBILINOGEN UR QL: NORMAL

## 2024-02-01 RX ORDER — NYSTATIN AND TRIAMCINOLONE ACETONIDE 100000; 1 [USP'U]/G; MG/G
1 OINTMENT TOPICAL 2 TIMES DAILY
Qty: 60 G | Refills: 0 | Status: SHIPPED | OUTPATIENT
Start: 2024-02-01 | End: 2024-02-08

## 2024-02-01 RX ORDER — CLOBETASOL PROPIONATE 0.5 MG/G
1 OINTMENT TOPICAL 2 TIMES DAILY
Qty: 60 G | Refills: 1 | Status: SHIPPED | OUTPATIENT
Start: 2024-02-01 | End: 2024-02-01 | Stop reason: SDUPTHER

## 2024-02-01 RX ORDER — CLOBETASOL PROPIONATE 0.5 MG/G
1 OINTMENT TOPICAL 2 TIMES DAILY
Qty: 60 G | Refills: 1 | Status: SHIPPED | OUTPATIENT
Start: 2024-02-01

## 2024-02-01 RX ORDER — NYSTATIN AND TRIAMCINOLONE ACETONIDE 100000; 1 [USP'U]/G; MG/G
1 OINTMENT TOPICAL 2 TIMES DAILY
Qty: 60 G | Refills: 0 | Status: SHIPPED | OUTPATIENT
Start: 2024-02-01 | End: 2024-02-01 | Stop reason: SDUPTHER

## 2024-02-01 NOTE — PROGRESS NOTES
"Subjective     CC: Vaginal pain     Brittany Villeda is a 88 y.o.  whose LMP is No LMP recorded (lmp unknown). Patient has had a hysterectomy.. She presents with continued c/o vulvar burning. Her symptoms have been ongoing for several months. Pt was seen by Dr. Taylor for similar c/o in 3/23. She was given estrogen cream and instructed to follow up in 2 months. Review of note indicates patient may need steroid cream in addition.  She did not follow up. She reports the estrogen cream did not alleviate her symptoms at all. She currently is using vasoline and Aquaphor for her symptoms which are poorly managed with this. She does wear a brief.     HPI    HPI    The following portions of the patient's history were reviewed and updated as appropriate:vital signs, allergies, current medications, past medical history, past social history, past surgical history, and problem list      Review of Systems     Review of Systems   Constitutional: Negative.    Genitourinary:         Vulvar pain        Objective      Ht 162.6 cm (64\")   Wt 84.4 kg (186 lb)   LMP  (LMP Unknown)   BMI 31.93 kg/m²     Physical Exam    Physical Exam  Vitals and nursing note reviewed.   Constitutional:       Appearance: Normal appearance.   Genitourinary:     Labia:         Right: Rash and tenderness present. No lesion.         Left: Rash and tenderness present. No lesion.           Comments: Green- area of hypopigmented skin  Red- Area of erythema   Musculoskeletal:         General: Normal range of motion.   Skin:     General: Skin is warm and dry.   Neurological:      General: No focal deficit present.      Mental Status: She is alert and oriented to person, place, and time.   Psychiatric:         Mood and Affect: Mood normal.         Behavior: Behavior normal.         Lab Review   Labs: Urinalysis - with micro     Imaging   No data reviewed    Assessment  Diagnoses and all orders for this visit:    1. Screening for genitourinary " condition (Primary)  -     POC Urinalysis Dipstick        Additional Assessment:   Vulvar pain     Plan     Vulvar pain- Has failed vaginal estrogen. 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 Also suspect yeast- erx mycolog cream. Plan to see patient back in 2 weeks. If no improvement will ref to dermatology. Enc pt to sleep with no underwear or pad on. Ok to continue aquaphor or use a moisture barrier cream.     RTO PRN     Valeria Darling, APRN  2/1/2024

## 2024-02-05 ENCOUNTER — TELEPHONE (OUTPATIENT)
Dept: GASTROENTEROLOGY | Facility: CLINIC | Age: 89
End: 2024-02-05
Payer: MEDICARE

## 2024-02-05 RX ORDER — LANOLIN ALCOHOL/MO/W.PET/CERES
1 CREAM (GRAM) TOPICAL DAILY
Qty: 90 TABLET | Refills: 1 | Status: SHIPPED | OUTPATIENT
Start: 2024-02-05

## 2024-02-05 RX ORDER — MESALAMINE 0.38 G/1
CAPSULE, EXTENDED RELEASE ORAL
Qty: 360 CAPSULE | Refills: 4 | OUTPATIENT
Start: 2024-02-05

## 2024-02-05 RX ORDER — PANTOPRAZOLE SODIUM 40 MG/1
40 TABLET, DELAYED RELEASE ORAL DAILY
Qty: 90 TABLET | Refills: 1 | Status: SHIPPED | OUTPATIENT
Start: 2024-02-05

## 2024-02-05 RX ORDER — MESALAMINE 0.38 G/1
1500 CAPSULE, EXTENDED RELEASE ORAL DAILY
Qty: 360 CAPSULE | Refills: 0 | Status: SHIPPED | OUTPATIENT
Start: 2024-02-05

## 2024-02-05 RX ORDER — FUROSEMIDE 20 MG/1
20 TABLET ORAL DAILY PRN
Qty: 30 TABLET | Refills: 1 | Status: SHIPPED | OUTPATIENT
Start: 2024-02-05

## 2024-02-05 RX ORDER — LANOLIN ALCOHOL/MO/W.PET/CERES
1000 CREAM (GRAM) TOPICAL DAILY
Qty: 90 TABLET | Refills: 1 | Status: SHIPPED | OUTPATIENT
Start: 2024-02-05

## 2024-02-05 RX ORDER — POTASSIUM CHLORIDE 750 MG/1
10 TABLET, EXTENDED RELEASE ORAL DAILY
Qty: 90 TABLET | Refills: 1 | Status: SHIPPED | OUTPATIENT
Start: 2024-02-05

## 2024-02-05 NOTE — TELEPHONE ENCOUNTER
----- Message from Brittany Villeda sent at 2/3/2024  6:20 PM EST -----  Regarding: Medication   Contact: [phone]  Dr English, I have a new insurance with [employer], can you send my refill of the M to the [facility] on [address]. It in my profile   Until I can get the new mail order set up at [facility]  Thank you   Brittany Villeda

## 2024-02-05 NOTE — TELEPHONE ENCOUNTER
Rx Refill Note  Requested Prescriptions     Pending Prescriptions Disp Refills    pantoprazole (PROTONIX) 40 MG EC tablet 90 tablet 1     Sig: Take 1 tablet by mouth Daily.    potassium chloride (K-DUR,KLOR-CON) 10 MEQ CR tablet 90 tablet 1     Sig: Take 1 tablet by mouth Daily.      Last office visit with prescribing clinician: Visit date not found   Last telemedicine visit with prescribing clinician: Visit date not found   Next office visit with prescribing clinician: Visit date not found                         Would you like a call back once the refill request has been completed: [] Yes [] No    If the office needs to give you a call back, can they leave a voicemail: [] Yes [] No    Ines Perry LPN  02/05/24, 07:32 EST

## 2024-02-05 NOTE — TELEPHONE ENCOUNTER
Rx Refill Note  Requested Prescriptions     Pending Prescriptions Disp Refills    Magnesium Oxide -Mg Supplement 400 (240 Mg) MG tablet 90 tablet 1     Sig: Take 1 tablet by mouth Daily.    furosemide (LASIX) 20 MG tablet       Sig: Take 1 tablet by mouth Daily As Needed.    vitamin B-12 (CYANOCOBALAMIN) 1000 MCG tablet 90 tablet 1     Sig: Take 1 tablet by mouth Daily.      Last office visit with prescribing clinician: 11/13/2023   Last telemedicine visit with prescribing clinician: Visit date not found   Next office visit with prescribing clinician: 4/22/2024                         Would you like a call back once the refill request has been completed: [] Yes [] No    If the office needs to give you a call back, can they leave a voicemail: [] Yes [] No    Ines Perry LPN  02/05/24, 07:31 EST

## 2024-02-05 NOTE — TELEPHONE ENCOUNTER
Caller: [employer] [facility]    Relationship: DAUGHTER    Best call back number:  [phone]    Requested Prescriptions:   Requested Prescriptions     Pending Prescriptions Disp Refills    mesalamine (APRISO) 0.375 g 24 hr capsule 360 capsule 4        Pharmacy where request should be sent:    [facility] [id number] [place], [place] - [address] [place] [address] BY AT [facility] & ([address] ) - [phone]  - [phone]    [address] [place] [id number]   Phone: [phone] Fax: [phone]       Last office visit with prescribing clinician: 3/13/2023   Last telemedicine visit with prescribing clinician: Visit date not found   Next office visit with prescribing clinician: 6/3/2024     Additional details provided by patient: DAUGHTER SCHEDULED APPT FOR PT    Does the patient have less than a 3 day supply:  [] Yes  [x] No    Would you like a call back once the refill request has been completed: [x] Yes [] No    If the office needs to give you a call back, can they leave a voicemail: [x] Yes [] No    Matty Drake Rep   02/05/24 08:44 EST

## 2024-02-16 ENCOUNTER — OFFICE VISIT (OUTPATIENT)
Dept: OBSTETRICS AND GYNECOLOGY | Facility: CLINIC | Age: 89
End: 2024-02-16
Payer: MEDICARE

## 2024-02-16 VITALS
BODY MASS INDEX: 31.76 KG/M2 | SYSTOLIC BLOOD PRESSURE: 128 MMHG | DIASTOLIC BLOOD PRESSURE: 70 MMHG | WEIGHT: 186 LBS | HEIGHT: 64 IN

## 2024-02-16 DIAGNOSIS — R10.2 VULVAR PAIN: Primary | ICD-10-CM

## 2024-02-16 RX ORDER — POTASSIUM CHLORIDE 750 MG/1
TABLET, FILM COATED, EXTENDED RELEASE ORAL
COMMUNITY
Start: 2024-02-06

## 2024-02-16 RX ORDER — NYSTATIN 100000 U/G
OINTMENT TOPICAL
COMMUNITY
Start: 2024-02-01

## 2024-03-22 NOTE — PROGRESS NOTES
Date of Office Visit: 2024  Encounter Provider: SHAHID García  Place of Service: [facility] CARDIOLOGY  Patient Name: Brittany Villeda  :1935    Chief complaint  Paroxysmal atrial fibrillation, thoracic aortic aneurysm     History of Present Illness  Patient is a 88 y.o. year old female  patient of Dr. Culp. Past medical history includes hypertension with hypertensive heart disease, obstructive sleep apnea (on BiPAP) obesity, immobility, pulmonary hypertension, history of nonsustained ventricular tachycardia, and obstructive sleep apnea.  She has a history of diastolic dysfunction, nonsustained ventricular tachycardia, atrial fibrillation and bradycardia.  She had a stress perfusion study that was negative in 2019.  She eventually underwent pacemaker placement 2018.  Echo 2021 with ejection fraction 60% with mild RV dysfunction negative saline injection with severe biatrial enlargement and no significant valvular heart disease.  RVSP was 38 mmHg.  Last CT angiogram of the chest was on 2023 when she was in the emergency room for pleuritic chest pain.  Not show pulmonary emboli.  It was a limited evaluation of the aorta.  Previously noted to be 4.1 cm aorta.  The aorta was reviewed with the aortic root measuring 3.6 cm acing aorta measuring 3.7 cm and dilated hepatic veins consistent with right-sided heart failure were noted.  On 2023 she had a follow-up echocardiogram that showed an ejection fraction 60% with indeterminate diastolic function, moderate severe atrial enlargement, mild mitral valve regurgitation, moderate tricuspid regurgitation with RVSP 39 mmHg.  The ascending aorta measured 3.8 cm arch measured 3.9 cm.     Interval history  Patient presents today for routine follow-up.  Her daughter has accompanied her to visit today per her preference.  I will visit with him for the first time today and have reviewed her medical record.  Since last  visit she continues to leave sleep apnea untreated.  She denies palpitations, shortness of breath, dizziness, chest pain or chest pressure, fatigue, syncope or presyncope.  She reports persistent lower extremity edema left greater than right.  She currently takes furosemide 20 mg as needed and usually takes this about twice per week.  Blood pressure today is at goal and she reports readings at home are similar when checked.    Past Medical History:   Diagnosis Date    Abdominal aortic aneurysm     Anemia     Arrhythmia     Arthritis     Asthma     Bradycardia     Choledocholithiasis 05/09/2021    Colitis     Diastolic dysfunction     Essential hypertension 05/12/2016    GERD (gastroesophageal reflux disease)     Heart block     Hypertension     Hypertensive heart disease     Hyperthyroidism     REPORTS ENLARGED    Inguinal hernia     Kidney stone     Leukopenia     MGUS (monoclonal gammopathy of unknown significance)     Nephrolithiasis     Pacemaker     Paroxysmal atrial fibrillation     Peptic ulceration     Pressure ulcer     REPORTS ON COCCYX. INSTR TO NOTIFY DR BAIRES'S OFFICE    PSVT (paroxysmal supraventricular tachycardia)     Pulmonary hypertension     Rectal bleed     Sleep apnea     NO DEVICE    Subdural hematoma     Systemic hypertension     Trifascicular block     Ulcerative rectosigmoiditis without complication     Ventricular tachycardia     nonsustained     Past Surgical History:   Procedure Laterality Date    BACK SURGERY      lumbar fusion    PAWAN HOLE Left 08/08/2021    Procedure: Left-sided pawan holes for evacuation of subdural hematoma;  Surgeon: Gustavo Callaway MD;  Location: [facility] OR;  Service: Neurosurgery;  Laterality: Left;    CARDIAC ELECTROPHYSIOLOGY PROCEDURE N/A 11/07/2018    Procedure: Pacemaker DC new   [place];  Surgeon: Jesus Granda MD;  Location: [facility] CATH INVASIVE LOCATION;  Service: Cardiology    CHOLECYSTECTOMY      COLONOSCOPY  06/16/2014    colitis,  cryptitis,  tics, NBIH, TA w/low grade dysplasia    COLONOSCOPY N/A 10/28/2019    Procedure: COLONOSCOPY WITH COLD AND HOT POLYPECTOMIES;  Surgeon: Micaela English MD;  Location:  [facility];  Service: Gastroenterology    ENDOSCOPY N/A 05/13/2021    Procedure: ESOPHAGOGASTRODUODENOSCOPY with BX;  Surgeon: Stefan Mcfadden MD;  Location:  [facility];  Service: Gastroenterology;  Laterality: N/A;  EPIGASTRIC PAIN  --DUODENAL ULCER, HEMORRHAGIC GASTRITIS, HIATAL HERNIA     ENDOSCOPY N/A 10/11/2022    Procedure: ESOPHAGOGASTRODUODENOSCOPY;  Surgeon: Micaela English MD;  Location:  [facility];  Service: Gastroenterology;  Laterality: N/A;  PRE- MELENA  POST- ESOPHAGITIS    HEMORRHOIDECTOMY      HERNIA REPAIR      umbilical    HYSTERECTOMY      INGUINAL HERNIA REPAIR Left 9/1/2023    Procedure: INGUINAL HERNIA REPAIR LEFT;  Surgeon: Antonio Foster MD;  [place]  [facility] [place] [facility] [place];  Service: General;  Laterality: Left;    JOINT REPLACEMENT Bilateral     KNEES    MYOMECTOMY      PACEMAKER IMPLANTATION      SHOULDER SURGERY      SIGMOIDOSCOPY N/A 11/02/2022    Procedure: SIGMOIDOSCOPY FLEXIBLE WITH COLD BIOPSIES AND ASPIRATE;  Surgeon: Micaela English MD;  [facility];  Service: Gastroenterology;  Laterality: N/A;  PRE- RECTAL BLEEDING  POST- HEMORRHOIDS, DIVERTICULOSIS, COLITIS    SINUS SURGERY      TOE NAIL AMPUTATION  03/04/2019    TONSILLECTOMY       Outpatient Medications Prior to Visit   Medication Sig Dispense Refill    acetaminophen (TYLENOL) 500 MG tablet Take 1 tablet by mouth Every 6 (Six) Hours As Needed for Mild Pain .      albuterol sulfate  (90 Base) MCG/ACT inhaler Inhale 2 puffs Every 4 (Four) Hours As Needed for Wheezing (or cough). 18 g 0    clobetasol (TEMOVATE) 0.05 % ointment Apply 1 Application topically to the appropriate area as directed 2 (Two) Times a Day. 60 g 1    clonazePAM (KlonoPIN) 0.5 MG tablet Take 1 tablet by mouth 2 (Two) Times a Day As  Needed for Anxiety (Grief related anxiety). 10 tablet 0    ELDERBERRY PO Take 2 tablets by mouth Daily. HOLDING FOR DOS      furosemide (LASIX) 20 MG tablet Take 1 tablet by mouth Daily As Needed (edema). 30 tablet 1    Magnesium Oxide -Mg Supplement 400 (240 Mg) MG tablet Take 1 tablet by mouth Daily. 90 tablet 1    mesalamine (APRISO) 0.375 g 24 hr capsule Take 4 capsules by mouth Daily. 360 capsule 0    nystatin (MYCOSTATIN) 656685 UNIT/GM ointment       pantoprazole (PROTONIX) 40 MG EC tablet Take 1 tablet by mouth Daily. 90 tablet 1    potassium chloride (K-DUR,KLOR-CON) 10 MEQ CR tablet Take 1 tablet by mouth Daily. 90 tablet 1    psyllium (METAMUCIL) 58.6 % packet Take 1 packet by mouth Daily.      vitamin B-12 (CYANOCOBALAMIN) 1000 MCG tablet Take 1 tablet by mouth Daily. 90 tablet 1    potassium chloride 10 MEQ CR tablet        No facility-administered medications prior to visit.       Allergies as of 2024 - Reviewed 2024   Allergen Reaction Noted    Codeine Hallucinations 2017    Amitriptyline Rash 2016    Amoxicillin-pot clavulanate Rash 2016    Aspirin Unknown - Low Severity 2016    Carisoprodol-aspirin-codeine Palpitations 2016    Iodinated contrast media Rash 2016    Latex Rash 2016    Naproxen Rash 2016    Nsaids Unknown - Low Severity 2016    Soma compound with codeine [carisoprodol-aspirin-codeine] Rash 2016    Sulfa antibiotics Rash 2016    Tramadol Palpitations 2019     Social History     Socioeconomic History    Marital status:     Number of children: 10    Years of education: High School   Tobacco Use    Smoking status: Former     Current packs/day: 0.00     Average packs/day: 1.5 packs/day for 12.0 years (18.0 ttl pk-yrs)     Types: Cigarettes     Start date:      Quit date: 1965     Years since quittin.2     Passive exposure: Past    Smokeless tobacco: Never    Tobacco comments:     QUIT  "SMOKING 1965   Vaping Use    Vaping status: Never Used   Substance and Sexual Activity    Alcohol use: No     Comment: caffeine - decaf coffee    Drug use: Never    Sexual activity: Defer     Family History   Problem Relation Age of Onset    Diabetes Mother     Breast cancer Sister     Kidney cancer Sister     Heart disease Sister     Prostate cancer Brother     Prostate cancer Brother     Prostate cancer Brother     Malig Hyperthermia Neg Hx      Review of Systems   Constitutional: Negative for malaise/fatigue.   Cardiovascular:  Positive for leg swelling. Negative for chest pain, claudication, dyspnea on exertion, near-syncope, orthopnea, palpitations, paroxysmal nocturnal dyspnea and syncope.   Respiratory:  Negative for shortness of breath.    Neurological:  Negative for brief paralysis, dizziness, headaches and light-headedness.   All other systems reviewed and are negative.       Objective:     Vitals:    03/25/24 0945   BP: 120/78   Pulse: 60   SpO2: 96%   Weight: 83 kg (183 lb)   Height: 162.6 cm (64\")     Body mass index is 31.41 kg/m².    Vitals reviewed.   Constitutional:       General: Not in acute distress.     Appearance: Well-developed and not in distress. Frail. Not diaphoretic.   HENT:      Head: Normocephalic.   Pulmonary:      Effort: Pulmonary effort is normal. No respiratory distress.      Breath sounds: Normal breath sounds. No wheezing. No rhonchi. No rales.   Cardiovascular:      Normal rate. Regular rhythm.   Pulses:     Radial: 2+ bilaterally.  Edema:     Peripheral edema present.     Pretibial: 1+ edema of the left pretibial area and trace edema of the right pretibial area.     Ankle: 1+ edema of the left ankle and trace edema of the right ankle.     Feet: 1+ edema of the left foot and trace edema of the right foot.  Skin:     General: Skin is warm and dry. There is no cyanosis.      Findings: No rash.   Neurological:      Mental Status: Alert and oriented to person, place, and time. "   Psychiatric:         Behavior: Behavior normal. Behavior is cooperative.         Thought Content: Thought content normal.         Judgment: Judgment normal.       Lab Review:     Lab Results   Component Value Date     11/13/2023     (H) 08/21/2023    K 4.5 11/13/2023    K 5.5 (H) 08/21/2023     11/13/2023     (H) 08/21/2023    CO2 30.6 (H) 11/13/2023    CO2 27.0 08/21/2023    BUN 12 11/13/2023    BUN 16 08/21/2023    CREATININE 0.87 11/13/2023    CREATININE 0.80 08/21/2023    EGFRIFNONA 77 07/29/2021    EGFRIFNONA 48 (L) 06/19/2020    EGFRIFAFRI 115 01/24/2022    EGFRIFAFRI 90 10/25/2021    GLUCOSE 100 (H) 11/13/2023    GLUCOSE 102 (H) 08/21/2023    CALCIUM 10.8 (H) 11/13/2023    CALCIUM 10.9 (H) 08/21/2023    PROTENTOTREF 7.2 11/13/2023    PROTENTOTREF 7.7 03/13/2023    ALBUMIN 4.3 11/13/2023    ALBUMIN 4.0 08/21/2023    BILITOT 1.2 11/13/2023    BILITOT 1.1 08/21/2023    AST 15 11/13/2023    AST 14 08/21/2023    ALT 8 11/13/2023    ALT 11 08/21/2023     Lab Results   Component Value Date    WBC 2.89 (L) 11/13/2023    WBC 3.77 08/21/2023    HGB 13.2 11/13/2023    HGB 13.1 08/21/2023    HCT 39.9 11/13/2023    HCT 39.4 08/21/2023    .5 (H) 11/13/2023    .1 (H) 08/21/2023     (L) 11/13/2023     (L) 08/21/2023     Lab Results   Component Value Date    PROBNP 762.0 01/24/2022    PROBNP 2,530.0 (H) 08/13/2021     Lab Results   Component Value Date    CKTOTAL 194 (H) 04/13/2021    TROPONINT 22 (H) 04/15/2023     Lab Results   Component Value Date    TSH 0.854 04/25/2022    TSH 0.544 04/11/2021             ECG 12 Lead    Date/Time: 3/25/2024 1:15 PM  Performed by: Sydney Gilliam APRN    Authorized by: Sydney Gilliam APRN  Comparison: compared with previous ECG   Similar to previous ECG  Rhythm: paced  Rate: normal  BPM: 60  QRS axis: normal  Comments: Paced rhythm        Assessment:       Diagnosis Plan   1. Longstanding persistent atrial fibrillation         2. Pedal edema        3. HUGO (obstructive sleep apnea)        4. Aneurysm of ascending aorta without rupture        5. AV block, complete        6. Presence of cardiac pacemaker        7. Diastolic dysfunction        8. Essential hypertension        9. Pulmonary hypertension          Plan:       1.  Persistent atrial fibrillation with reasonable rate control.  She is felt to be poor candidate for anticoagulation due to history of GI bleed and decreased mobility.  Rate controlled and asymptomatic today  2.  Dependent edema.  Significant on exam today.  Asked her to increase Lasix temporarily and take 20 mg daily for the next 4 days.  She can then resume her as needed dosing.  Also encouraged her to limit salt in her diet in order to help control edema.  Elevating her legs and wearing compression stockings may also be beneficial.  3.  Status post permanent pacemaker placement 2018.  Continue routine device checks  4.  History of diastolic dysfunction.  Peripheral edema but no rales or diminished breath sounds.  Recommendations as above  5.  History of hyperkalemia, normal on most recent labs  6.  Dilated asending aorta.  Ascending aorta measured 3.7 cm by CT 4/2023.  Consider repeat CT scan at next appointment.  7.  History of RV dysfunction and pulmonary hypertension now with.  Evidence of right-sided heart failure on CT scan early 2023.  Most recent echo 8/2023 with improved RV function.  8.  Untreated HUGO, untreated.   9.  Rectal bleeding with hemorroids.  No recent events  10.  Mediastinal adenopathy.  Followed by Dr. Esparza  11.  Debilitated state.  Slowly ambulating around her home.  12.  Thyromegaly with tracheal mass effect.  Additional recommendations per Dr. Esparza      Time Spent: I spent 30 minutes caring for Brittany on this date of service. This time includes time spent by me in the following activities: preparing for the visit, reviewing tests, performing a medically appropriate examination and/or  evaluations, counseling and educating the patient/family/caregiver, ordering medications, tests, or procedures, documenting information in the medical record, and independently interpreting results and communicating that information with the patient/family/caregiver.   I spent 1 minutes on the separately reported service of ECG. This time is not included in the time used to support the E/M service also reported today.        Your medication list            Accurate as of March 25, 2024  1:17 PM. If you have any questions, ask your nurse or doctor.                CONTINUE taking these medications        Instructions Last Dose Given Next Dose Due   acetaminophen 500 MG tablet  Commonly known as: TYLENOL      Take 1 tablet by mouth Every 6 (Six) Hours As Needed for Mild Pain .       albuterol sulfate  (90 Base) MCG/ACT inhaler  Commonly known as: PROVENTIL HFA;VENTOLIN HFA;PROAIR HFA      Inhale 2 puffs Every 4 (Four) Hours As Needed for Wheezing (or cough).       clobetasol 0.05 % ointment  Commonly known as: TEMOVATE      Apply 1 Application topically to the appropriate area as directed 2 (Two) Times a Day.       clonazePAM 0.5 MG tablet  Commonly known as: KlonoPIN      Take 1 tablet by mouth 2 (Two) Times a Day As Needed for Anxiety (Grief related anxiety).       ELDERBERRY PO      Take 2 tablets by mouth Daily. HOLDING FOR DOS       furosemide 20 MG tablet  Commonly known as: LASIX      Take 1 tablet by mouth Daily As Needed (edema).       Magnesium Oxide -Mg Supplement 400 (240 Mg) MG tablet      Take 1 tablet by mouth Daily.       mesalamine 0.375 g 24 hr capsule  Commonly known as: APRISO      Take 4 capsules by mouth Daily.       nystatin 926012 UNIT/GM ointment  Commonly known as: MYCOSTATIN           pantoprazole 40 MG EC tablet  Commonly known as: PROTONIX      Take 1 tablet by mouth Daily.       potassium chloride 10 MEQ CR tablet  Commonly known as: KLOR-CON M10      Take 1 tablet by mouth Daily.        psyllium 58.6 % packet  Commonly known as: METAMUCIL      Take 1 packet by mouth Daily.       vitamin B-12 1000 MCG tablet  Commonly known as: CYANOCOBALAMIN      Take 1 tablet by mouth Daily.                Patient is no longer taking -.  I corrected the med list to reflect this.  I did not stop these medications.    Return in about 6 months (around 9/25/2024) for with Dr. Culp with PPM device check same day.      Dictated utilizing Dragon dictation

## 2024-03-25 ENCOUNTER — OFFICE VISIT (OUTPATIENT)
Dept: CARDIOLOGY | Facility: CLINIC | Age: 89
End: 2024-03-25
Payer: MEDICARE

## 2024-03-25 VITALS
SYSTOLIC BLOOD PRESSURE: 120 MMHG | HEART RATE: 60 BPM | BODY MASS INDEX: 31.24 KG/M2 | OXYGEN SATURATION: 96 % | HEIGHT: 64 IN | DIASTOLIC BLOOD PRESSURE: 78 MMHG | WEIGHT: 183 LBS

## 2024-03-25 DIAGNOSIS — I48.11 LONGSTANDING PERSISTENT ATRIAL FIBRILLATION: Primary | ICD-10-CM

## 2024-03-25 DIAGNOSIS — I51.89 DIASTOLIC DYSFUNCTION: ICD-10-CM

## 2024-03-25 DIAGNOSIS — I44.2 AV BLOCK, COMPLETE: ICD-10-CM

## 2024-03-25 DIAGNOSIS — R60.0 PEDAL EDEMA: ICD-10-CM

## 2024-03-25 DIAGNOSIS — I10 ESSENTIAL HYPERTENSION: ICD-10-CM

## 2024-03-25 DIAGNOSIS — I71.21 ANEURYSM OF ASCENDING AORTA WITHOUT RUPTURE: ICD-10-CM

## 2024-03-25 DIAGNOSIS — I27.20 PULMONARY HYPERTENSION: ICD-10-CM

## 2024-03-25 DIAGNOSIS — Z95.0 PRESENCE OF CARDIAC PACEMAKER: ICD-10-CM

## 2024-03-25 DIAGNOSIS — G47.33 OSA (OBSTRUCTIVE SLEEP APNEA): ICD-10-CM

## 2024-03-25 PROCEDURE — 93000 ELECTROCARDIOGRAM COMPLETE: CPT | Performed by: NURSE PRACTITIONER

## 2024-03-25 PROCEDURE — 99214 OFFICE O/P EST MOD 30 MIN: CPT | Performed by: NURSE PRACTITIONER

## 2024-04-22 ENCOUNTER — OFFICE VISIT (OUTPATIENT)
Dept: FAMILY MEDICINE CLINIC | Facility: CLINIC | Age: 89
End: 2024-04-22
Payer: MEDICARE

## 2024-04-22 VITALS
OXYGEN SATURATION: 95 % | BODY MASS INDEX: 31.38 KG/M2 | WEIGHT: 183.8 LBS | HEART RATE: 63 BPM | TEMPERATURE: 97.5 F | DIASTOLIC BLOOD PRESSURE: 66 MMHG | SYSTOLIC BLOOD PRESSURE: 120 MMHG | HEIGHT: 64 IN

## 2024-04-22 DIAGNOSIS — R60.9 DEPENDENT EDEMA: ICD-10-CM

## 2024-04-22 DIAGNOSIS — Z00.00 MEDICARE ANNUAL WELLNESS VISIT, SUBSEQUENT: Primary | ICD-10-CM

## 2024-04-22 DIAGNOSIS — L98.491 SKIN ULCER, LIMITED TO BREAKDOWN OF SKIN: ICD-10-CM

## 2024-04-22 DIAGNOSIS — D69.6 THROMBOCYTOPENIA: Chronic | ICD-10-CM

## 2024-04-22 PROCEDURE — G0439 PPPS, SUBSEQ VISIT: HCPCS | Performed by: FAMILY MEDICINE

## 2024-04-22 PROCEDURE — 1170F FXNL STATUS ASSESSED: CPT | Performed by: FAMILY MEDICINE

## 2024-04-22 PROCEDURE — 99214 OFFICE O/P EST MOD 30 MIN: CPT | Performed by: FAMILY MEDICINE

## 2024-04-22 RX ORDER — CLONAZEPAM 0.5 MG/1
TABLET ORAL
Qty: 10 TABLET | OUTPATIENT
Start: 2024-04-22

## 2024-04-22 RX ORDER — CLONAZEPAM 0.5 MG/1
0.5 TABLET ORAL 2 TIMES DAILY PRN
Qty: 10 TABLET | Refills: 0 | Status: SHIPPED | OUTPATIENT
Start: 2024-04-22

## 2024-04-22 RX ORDER — TACROLIMUS 1 MG/G
1 OINTMENT TOPICAL 2 TIMES DAILY
COMMUNITY

## 2024-04-22 NOTE — PROGRESS NOTES
The ABCs of the Annual Wellness Visit  Subsequent Medicare Wellness Visit    Subjective    Brittany Villeda is a 89 y.o. female who presents for a Subsequent Medicare Wellness Visit.    The following portions of the patient's history were reviewed and   updated as appropriate: allergies, current medications, past family history, past medical history, past social history, past surgical history, and problem list.    Compared to one year ago, the patient feels her physical   health is worse.    Compared to one year ago, the patient feels her mental   health is worse.    Recent Hospitalizations:  She was not admitted to the hospital during the last year.       Current Medical Providers:  Patient Care Team:  Mega Esparza MD as PCP - General (Family Medicine)  Micaela English MD as Consulting Physician (Gastroenterology)  Mary Grcae Culp MD as Consulting Physician (Cardiology)  Jp Krause Jr., MD as Consulting Physician (Hematology and Oncology)  Yasmeen Pichardo RPH as Pharmacist  Micaela English MD as Referring Physician (Gastroenterology)  Devon Valadez MD as Consulting Physician (Hematology and Oncology)  Rusty Interiano, JamarD as Pharmacist (Pharmacy)  Catie Jules PA as Physician Assistant (Gastroenterology)  Antonio Foster MD as Surgeon (General Surgery)  Valeria Darling APRN as Nurse Practitioner (Obstetrics and Gynecology)    Outpatient Medications Prior to Visit   Medication Sig Dispense Refill    acetaminophen (TYLENOL) 500 MG tablet Take 1 tablet by mouth Every 6 (Six) Hours As Needed for Mild Pain .      albuterol sulfate  (90 Base) MCG/ACT inhaler Inhale 2 puffs Every 4 (Four) Hours As Needed for Wheezing (or cough). 18 g 0    clobetasol (TEMOVATE) 0.05 % ointment Apply 1 Application topically to the appropriate area as directed 2 (Two) Times a Day. 60 g 1    ELDERBERRY PO Take 2 tablets by mouth Daily. HOLDING FOR DOS      furosemide (LASIX) 20 MG tablet Take 1 tablet by  mouth Daily As Needed (edema). 30 tablet 1    Magnesium Oxide -Mg Supplement 400 (240 Mg) MG tablet Take 1 tablet by mouth Daily. 90 tablet 1    mesalamine (APRISO) 0.375 g 24 hr capsule Take 4 capsules by mouth Daily. 360 capsule 0    nystatin (MYCOSTATIN) 502038 UNIT/GM ointment       pantoprazole (PROTONIX) 40 MG EC tablet Take 1 tablet by mouth Daily. 90 tablet 1    potassium chloride (K-DUR,KLOR-CON) 10 MEQ CR tablet Take 1 tablet by mouth Daily. 90 tablet 1    psyllium (METAMUCIL) 58.6 % packet Take 1 packet by mouth Daily.      tacrolimus (PROTOPIC) 0.1 % ointment Apply 1 Application topically to the appropriate area as directed 2 (Two) Times a Day.      vitamin B-12 (CYANOCOBALAMIN) 1000 MCG tablet Take 1 tablet by mouth Daily. 90 tablet 1    clonazePAM (KlonoPIN) 0.5 MG tablet Take 1 tablet by mouth 2 (Two) Times a Day As Needed for Anxiety (Grief related anxiety). 10 tablet 0     No facility-administered medications prior to visit.       No opioid medication identified on active medication list. I have reviewed chart for other potential  high risk medication/s and harmful drug interactions in the elderly.        Aspirin is not on active medication list.  Aspirin use is not indicated based on review of current medical condition/s. Risk of harm outweighs potential benefits.  .    Patient Active Problem List   Diagnosis    Non-toxic multinodular goiter    Chronic midline low back pain with right-sided sciatica    Essential hypertension    Gastroesophageal reflux disease without esophagitis    Cataract    Pulmonary hypertension    Diastolic dysfunction    HUGO (obstructive sleep apnea)    Trifascicular block    MGUS (monoclonal gammopathy of unknown significance)    Ulcerative rectosigmoiditis without complication    Lichen sclerosus    Heart block    Sleep-related hypoxia    Bradycardia    History of atrial fibrillation    Pacemaker    Paroxysmal SVT (supraventricular tachycardia)    History of chest pain     "Palpitations    Atrial fibrillation    Ascending aortic aneurysm    Mediastinal lymphadenopathy    Anemia    Obesity (BMI 30-39.9)    Dysautonomia    Hypercalcemia    Hyperparathyroidism    Choledocholithiasis    Duodenal ulcer    Hemorrhagic gastritis    Exertional dyspnea    COPD (chronic obstructive pulmonary disease)    Osteoarthritis of glenohumeral joint    ICH (intracerebral hemorrhage)    Lower GI bleed    Thrombocytopenia    Lichen sclerosus of female genitalia    Senile atrophic vaginitis    Left inguinal hernia    Vulvar pain     Advance Care Planning   Advance Care Planning     Advance Directive is on file.  ACP discussion was held with the patient during this visit. Patient has an advance directive in EMR which is still valid.      Objective    Vitals:    24 0937   BP: 120/66   Pulse: 63   Temp: 97.5 °F (36.4 °C)   TempSrc: Temporal   SpO2: 95%   Weight: 83.4 kg (183 lb 12.8 oz)   Height: 162.6 cm (64\")     Estimated body mass index is 31.55 kg/m² as calculated from the following:    Height as of this encounter: 162.6 cm (64\").    Weight as of this encounter: 83.4 kg (183 lb 12.8 oz).           Does the patient have evidence of cognitive impairment? No          HEALTH RISK ASSESSMENT    Smoking Status:  Social History     Tobacco Use   Smoking Status Former    Current packs/day: 0.00    Average packs/day: 1.5 packs/day for 12.0 years (18.0 ttl pk-yrs)    Types: Cigarettes    Start date:     Quit date: 1965    Years since quittin.3    Passive exposure: Past   Smokeless Tobacco Never   Tobacco Comments    QUIT SMOKING      Alcohol Consumption:  Social History     Substance and Sexual Activity   Alcohol Use No    Comment: caffeine - decaf coffee     Fall Risk Screen:    STEADI Fall Risk Assessment was completed, and patient is at LOW risk for falls.Assessment completed on:2024    Depression Screenin/22/2024     9:36 AM   PHQ-2/PHQ-9 Depression Screening   Little Interest or " Pleasure in Doing Things 0-->not at all   Feeling Down, Depressed or Hopeless 0-->not at all   PHQ-9: Brief Depression Severity Measure Score 0       Health Habits and Functional and Cognitive Screenin/22/2024     9:33 AM   Functional & Cognitive Status   Do you have difficulty preparing food and eating? No   Do you have difficulty bathing yourself, getting dressed or grooming yourself? No   Do you have difficulty using the toilet? No   Do you have difficulty moving around from place to place? No   Do you have trouble with steps or getting out of a bed or a chair? No   Current Diet Well Balanced Diet   Dental Exam Up to date   Eye Exam Up to date   Exercise (times per week) 4 times per week   Current Exercises Include Walking   Do you need help using the phone?  No   Are you deaf or do you have serious difficulty hearing?  Yes   Do you need help to go to places out of walking distance? Yes   Do you need help shopping? No   Do you need help preparing meals?  No   Do you need help with housework?  No   Do you need help with laundry? No   Do you need help taking your medications? No   Do you need help managing money? No   Do you ever drive or ride in a car without wearing a seat belt? No   Have you felt unusual stress, anger or loneliness in the last month? No   Who do you live with? Child   If you need help, do you have trouble finding someone available to you? No   Have you been bothered in the last four weeks by sexual problems? No   Do you have difficulty concentrating, remembering or making decisions? No       Age-appropriate Screening Schedule:  Refer to the list below for future screening recommendations based on patient's age, sex and/or medical conditions. Orders for these recommended tests are listed in the plan section. The patient has been provided with a written plan.    Health Maintenance   Topic Date Due    RSV Vaccine - Adults (1 - 1-dose 60+ series) Never done    DXA SCAN  2021     TDAP/TD VACCINES (2 - Td or Tdap) 2022    COVID-19 Vaccine ( - - season) Never done    ANNUAL WELLNESS VISIT  2024    BMI FOLLOWUP  2024    INFLUENZA VACCINE  2024    COLORECTAL CANCER SCREENING  10/28/2029    Pneumococcal Vaccine 65+  Completed    ZOSTER VACCINE  Discontinued                  CMS Preventative Services Quick Reference  Risk Factors Identified During Encounter  Immunizations Discussed/Encouraged: COVID19  Inactivity/Sedentary: Movement as possible.  The above risks/problems have been discussed with the patient.  Pertinent information has been shared with the patient in the After Visit Summary.  An After Visit Summary and PPPS were made available to the patient.    Follow Up:   Next Medicare Wellness visit to be scheduled in 1 year.       Additional E&M Note during same encounter follows:  Patient has multiple medical problems which are significant and separately identifiable that require additional work above and beyond the Medicare Wellness Visit.      Chief Complaint  Wound Check (Red sore on bottom 2 weeks ), Joint Swelling (Bilateral ankle swelling ), Medicare Wellness-subsequent, and Anxiety (Recent death of daughter 24)    Subjective        HPI  Brittany Villeda is also being seen today for one of her daughters  recently.  She is coping.  Grieving.  She would like a refill of clonazepam that I prescribed last year when another daughter had .  She tolerated the medication.  She received a short supply.    Dependent edema.  She continues on furosemide 20 mg daily with potassium.  No recent lab work.  No shortness of breath.  No chest pain.  No syncopal episodes.  He has had increased edema the last few days.    History of relative leukopenia and thrombocytopenia.  She has had some petechiae on her forearms but none else.  She does have a small sore on her buttocks.  Daughter thinks it is improving.         Objective   Vital Signs:  /66   Pulse  "63   Temp 97.5 °F (36.4 °C) (Temporal)   Ht 162.6 cm (64\")   Wt 83.4 kg (183 lb 12.8 oz)   SpO2 95%   BMI 31.55 kg/m²     Physical Exam  Vitals and nursing note reviewed.   Constitutional:       General: She is not in acute distress.     Appearance: She is well-developed.   Cardiovascular:      Rate and Rhythm: Normal rate and regular rhythm.      Heart sounds: Normal heart sounds.   Pulmonary:      Effort: Pulmonary effort is normal.      Breath sounds: Normal breath sounds.   Genitourinary:     Comments: She has a 1 cm very shallow ulcer on the left buttocks.  Musculoskeletal:      Right lower leg: Edema present.      Left lower leg: Edema present.      Comments: She has +3 doughy edema in the lower extremities.   Skin:     General: Skin is warm and dry.      Findings: Rash present.      Comments: Small petechial rash on the forearms.   Psychiatric:         Mood and Affect: Mood normal.                         Assessment and Plan   Diagnoses and all orders for this visit:    1. Medicare annual wellness visit, subsequent (Primary)    2. Dependent edema  -     CBC & Differential; Future  -     Comprehensive Metabolic Panel; Future  -     CBC & Differential  -     Comprehensive Metabolic Panel    3. Thrombocytopenia  -     CBC & Differential; Future  -     Comprehensive Metabolic Panel; Future  -     CBC & Differential  -     Comprehensive Metabolic Panel    4. Skin ulcer, limited to breakdown of skin    Other orders  -     clonazePAM (KlonoPIN) 0.5 MG tablet; Take 1 tablet by mouth 2 (Two) Times a Day As Needed for Anxiety (Grief related anxiety).  Dispense: 10 tablet; Refill: 0      Annual Medicare wellness visit.    Situational grief.  Counseling given.  Short course of clonazepam indicated as needed.    Thrombocytopenia.  She has a petechial rash on her forearms.  May not be from the thrombocytopenia.  Her previous levels have been stable.  Rechecking CBC today.    Dependent edema.  Likely multifactorial.  " No evidence of congestive heart failure.  Checking creatinine today.  She can double her furosemide up to 40 mg a day for few days.  Take double potassium also.  If not improving let us know.    Limited skin ulcer on the left buttocks.  May be from a boil.  Or from pressure ulcer.  Although she is moving around a lot.  It is improving.  If getting worse I can get a home health skin nurse to come out help her.    I will see her in 6 months.  Sooner as needed.       Follow Up   Return in about 6 months (around 10/22/2024) for Lab Visit One Week Prior to Next Appointment, Recheck.  Patient was given instructions and counseling regarding her condition or for health maintenance advice. Please see specific information pulled into the AVS if appropriate.

## 2024-04-23 ENCOUNTER — HOSPITAL ENCOUNTER (OUTPATIENT)
Facility: HOSPITAL | Age: 89
Setting detail: OBSERVATION
Discharge: HOME | End: 2024-04-25
Attending: EMERGENCY MEDICINE | Admitting: HOSPITALIST
Payer: MEDICARE

## 2024-04-23 ENCOUNTER — TELEPHONE (OUTPATIENT)
Dept: FAMILY MEDICINE CLINIC | Facility: CLINIC | Age: 89
End: 2024-04-23

## 2024-04-23 DIAGNOSIS — I50.33 ACUTE ON CHRONIC DIASTOLIC CONGESTIVE HEART FAILURE: ICD-10-CM

## 2024-04-23 DIAGNOSIS — D69.6 THROMBOCYTOPENIA: ICD-10-CM

## 2024-04-23 DIAGNOSIS — R60.0 BILATERAL LOWER EXTREMITY EDEMA: Primary | ICD-10-CM

## 2024-04-23 LAB
ALBUMIN SERPL-MCNC: 3.4 G/DL (ref 3.5–5.2)
ALBUMIN SERPL-MCNC: 4.1 G/DL (ref 3.7–4.7)
ALBUMIN/GLOB SERPL: 1.1 G/DL
ALBUMIN/GLOB SERPL: 1.4 {RATIO} (ref 1.2–2.2)
ALP SERPL-CCNC: 60 U/L (ref 39–117)
ALP SERPL-CCNC: 65 IU/L (ref 44–121)
ALT SERPL W P-5'-P-CCNC: 9 U/L (ref 1–33)
ALT SERPL-CCNC: 12 IU/L (ref 0–32)
ANION GAP SERPL CALCULATED.3IONS-SCNC: 9.1 MMOL/L (ref 5–15)
ANISOCYTOSIS BLD QL: ABNORMAL
AST SERPL-CCNC: 13 U/L (ref 1–32)
AST SERPL-CCNC: 19 IU/L (ref 0–40)
BASOPHILS # BLD AUTO: 0 X10E3/UL (ref 0–0.2)
BASOPHILS # BLD MANUAL: 0.03 10*3/MM3 (ref 0–0.2)
BASOPHILS NFR BLD AUTO: 0 %
BASOPHILS NFR BLD MANUAL: 1.1 % (ref 0–1.5)
BILIRUB SERPL-MCNC: 1.1 MG/DL (ref 0–1.2)
BILIRUB SERPL-MCNC: 1.2 MG/DL (ref 0–1.2)
BUN SERPL-MCNC: 15 MG/DL (ref 8–23)
BUN SERPL-MCNC: 16 MG/DL (ref 8–27)
BUN/CREAT SERPL: 17 (ref 12–28)
BUN/CREAT SERPL: 17.6 (ref 7–25)
CALCIUM SERPL-MCNC: 10.7 MG/DL (ref 8.7–10.3)
CALCIUM SPEC-SCNC: 10.2 MG/DL (ref 8.6–10.5)
CHLORIDE SERPL-SCNC: 104 MMOL/L (ref 96–106)
CHLORIDE SERPL-SCNC: 107 MMOL/L (ref 98–107)
CO2 SERPL-SCNC: 25 MMOL/L (ref 20–29)
CO2 SERPL-SCNC: 25.9 MMOL/L (ref 22–29)
CREAT SERPL-MCNC: 0.85 MG/DL (ref 0.57–1)
CREAT SERPL-MCNC: 0.95 MG/DL (ref 0.57–1)
DEPRECATED RDW RBC AUTO: 53 FL (ref 37–54)
EGFRCR SERPLBLD CKD-EPI 2021: 57 ML/MIN/1.73
EGFRCR SERPLBLD CKD-EPI 2021: 65.6 ML/MIN/1.73
EOSINOPHIL # BLD AUTO: 0 X10E3/UL (ref 0–0.4)
EOSINOPHIL NFR BLD AUTO: 0 %
ERYTHROCYTE [DISTWIDTH] IN BLOOD BY AUTOMATED COUNT: 13.2 % (ref 11.7–15.4)
ERYTHROCYTE [DISTWIDTH] IN BLOOD BY AUTOMATED COUNT: 13.4 % (ref 12.3–15.4)
GLOBULIN SER CALC-MCNC: 2.9 G/DL (ref 1.5–4.5)
GLOBULIN UR ELPH-MCNC: 3.1 GM/DL
GLUCOSE SERPL-MCNC: 102 MG/DL (ref 70–99)
GLUCOSE SERPL-MCNC: 107 MG/DL (ref 65–99)
HCT VFR BLD AUTO: 38.4 % (ref 34–46.6)
HCT VFR BLD AUTO: 39.5 % (ref 34–46.6)
HGB BLD-MCNC: 12.6 G/DL (ref 12–15.9)
HGB BLD-MCNC: 13.2 G/DL (ref 11.1–15.9)
IMM GRANULOCYTES # BLD AUTO: 0 X10E3/UL (ref 0–0.1)
IMM GRANULOCYTES NFR BLD AUTO: 0 %
LYMPHOCYTES # BLD AUTO: 1.9 X10E3/UL (ref 0.7–3.1)
LYMPHOCYTES # BLD MANUAL: 1.58 10*3/MM3 (ref 0.7–3.1)
LYMPHOCYTES NFR BLD AUTO: 55 %
LYMPHOCYTES NFR BLD MANUAL: 8.6 % (ref 5–12)
MACROCYTES BLD QL SMEAR: ABNORMAL
MCH RBC QN AUTO: 34.4 PG (ref 26.6–33)
MCH RBC QN AUTO: 35 PG (ref 26.6–33)
MCHC RBC AUTO-ENTMCNC: 32.8 G/DL (ref 31.5–35.7)
MCHC RBC AUTO-ENTMCNC: 33.4 G/DL (ref 31.5–35.7)
MCV RBC AUTO: 103 FL (ref 79–97)
MCV RBC AUTO: 106.7 FL (ref 79–97)
MONOCYTES # BLD AUTO: 0.3 X10E3/UL (ref 0.1–0.9)
MONOCYTES # BLD: 0.27 10*3/MM3 (ref 0.1–0.9)
MONOCYTES NFR BLD AUTO: 10 %
NEUTROPHILS # BLD AUTO: 1.2 X10E3/UL (ref 1.4–7)
NEUTROPHILS # BLD AUTO: 1.25 10*3/MM3 (ref 1.7–7)
NEUTROPHILS NFR BLD AUTO: 35 %
NEUTROPHILS NFR BLD MANUAL: 39.8 % (ref 42.7–76)
NRBC BLD AUTO-RTO: 0 /100 WBC (ref 0–0.2)
NT-PROBNP SERPL-MCNC: 579 PG/ML (ref 0–1800)
PLAT MORPH BLD: NORMAL
PLATELET # BLD AUTO: 108 10*3/MM3 (ref 140–450)
PLATELET # BLD AUTO: 127 X10E3/UL (ref 150–450)
PMV BLD AUTO: 9.3 FL (ref 6–12)
POTASSIUM SERPL-SCNC: 4.7 MMOL/L (ref 3.5–5.2)
POTASSIUM SERPL-SCNC: 4.9 MMOL/L (ref 3.5–5.2)
PROT SERPL-MCNC: 6.5 G/DL (ref 6–8.5)
PROT SERPL-MCNC: 7 G/DL (ref 6–8.5)
RBC # BLD AUTO: 3.6 10*6/MM3 (ref 3.77–5.28)
RBC # BLD AUTO: 3.84 X10E6/UL (ref 3.77–5.28)
SMUDGE CELLS BLD QL SMEAR: ABNORMAL
SODIUM SERPL-SCNC: 142 MMOL/L (ref 136–145)
SODIUM SERPL-SCNC: 143 MMOL/L (ref 134–144)
VARIANT LYMPHS NFR BLD MANUAL: 3.2 % (ref 0–5)
VARIANT LYMPHS NFR BLD MANUAL: 47.3 % (ref 19.6–45.3)
WBC # BLD AUTO: 3.4 X10E3/UL (ref 3.4–10.8)
WBC NRBC COR # BLD AUTO: 3.13 10*3/MM3 (ref 3.4–10.8)

## 2024-04-23 PROCEDURE — 83880 ASSAY OF NATRIURETIC PEPTIDE: CPT | Performed by: EMERGENCY MEDICINE

## 2024-04-23 PROCEDURE — G0378 HOSPITAL OBSERVATION PER HR: HCPCS

## 2024-04-23 PROCEDURE — 99285 EMERGENCY DEPT VISIT HI MDM: CPT

## 2024-04-23 PROCEDURE — 96374 THER/PROPH/DIAG INJ IV PUSH: CPT

## 2024-04-23 PROCEDURE — 85025 COMPLETE CBC W/AUTO DIFF WBC: CPT | Performed by: EMERGENCY MEDICINE

## 2024-04-23 PROCEDURE — 80053 COMPREHEN METABOLIC PANEL: CPT | Performed by: EMERGENCY MEDICINE

## 2024-04-23 PROCEDURE — 25010000002 FUROSEMIDE PER 20 MG: Performed by: EMERGENCY MEDICINE

## 2024-04-23 PROCEDURE — 85007 BL SMEAR W/DIFF WBC COUNT: CPT | Performed by: EMERGENCY MEDICINE

## 2024-04-23 RX ORDER — PANTOPRAZOLE SODIUM 40 MG/1
40 TABLET, DELAYED RELEASE ORAL DAILY
Status: DISCONTINUED | OUTPATIENT
Start: 2024-04-24 | End: 2024-04-25 | Stop reason: HOSPADM

## 2024-04-23 RX ORDER — VALACYCLOVIR HYDROCHLORIDE 1 G/1
1000 TABLET, FILM COATED ORAL DAILY
COMMUNITY

## 2024-04-23 RX ORDER — SODIUM CHLORIDE 9 MG/ML
40 INJECTION, SOLUTION INTRAVENOUS AS NEEDED
Status: DISCONTINUED | OUTPATIENT
Start: 2024-04-23 | End: 2024-04-25 | Stop reason: HOSPADM

## 2024-04-23 RX ORDER — VALACYCLOVIR HYDROCHLORIDE 500 MG/1
1000 TABLET, FILM COATED ORAL
Status: DISCONTINUED | OUTPATIENT
Start: 2024-04-24 | End: 2024-04-25 | Stop reason: HOSPADM

## 2024-04-23 RX ORDER — CLONAZEPAM 0.5 MG/1
0.5 TABLET ORAL 2 TIMES DAILY PRN
Status: DISCONTINUED | OUTPATIENT
Start: 2024-04-23 | End: 2024-04-25 | Stop reason: HOSPADM

## 2024-04-23 RX ORDER — MESALAMINE 0.38 G/1
1.5 CAPSULE, EXTENDED RELEASE ORAL DAILY
Status: DISCONTINUED | OUTPATIENT
Start: 2024-04-24 | End: 2024-04-25 | Stop reason: HOSPADM

## 2024-04-23 RX ORDER — SODIUM CHLORIDE 0.9 % (FLUSH) 0.9 %
10 SYRINGE (ML) INJECTION AS NEEDED
Status: DISCONTINUED | OUTPATIENT
Start: 2024-04-23 | End: 2024-04-24

## 2024-04-23 RX ORDER — ACETAMINOPHEN 500 MG
500 TABLET ORAL EVERY 6 HOURS PRN
Status: DISCONTINUED | OUTPATIENT
Start: 2024-04-23 | End: 2024-04-25 | Stop reason: HOSPADM

## 2024-04-23 RX ORDER — LIDOCAINE 50 MG/G
1 OINTMENT TOPICAL DAILY
COMMUNITY

## 2024-04-23 RX ORDER — FUROSEMIDE 40 MG/1
40 TABLET ORAL DAILY
Status: ON HOLD | COMMUNITY
End: 2024-04-25

## 2024-04-23 RX ORDER — SODIUM CHLORIDE 0.9 % (FLUSH) 0.9 %
10 SYRINGE (ML) INJECTION AS NEEDED
Status: DISCONTINUED | OUTPATIENT
Start: 2024-04-23 | End: 2024-04-25 | Stop reason: HOSPADM

## 2024-04-23 RX ORDER — SODIUM CHLORIDE 0.9 % (FLUSH) 0.9 %
10 SYRINGE (ML) INJECTION EVERY 12 HOURS SCHEDULED
Status: DISCONTINUED | OUTPATIENT
Start: 2024-04-23 | End: 2024-04-25 | Stop reason: HOSPADM

## 2024-04-23 RX ORDER — FUROSEMIDE 10 MG/ML
80 INJECTION INTRAMUSCULAR; INTRAVENOUS ONCE
Status: COMPLETED | OUTPATIENT
Start: 2024-04-23 | End: 2024-04-23

## 2024-04-23 RX ADMIN — FUROSEMIDE 80 MG: 10 INJECTION, SOLUTION INTRAMUSCULAR; INTRAVENOUS at 19:41

## 2024-04-23 RX ADMIN — Medication 10 ML: at 21:48

## 2024-04-23 NOTE — ED TRIAGE NOTES
Patient to ED via PV c/o bilateral leg and foot swelling and weeping since last night. Patient was recently increased to 40mg Lasix yesterday.

## 2024-04-23 NOTE — ED PROVIDER NOTES
EMERGENCY DEPARTMENT ENCOUNTER  Room Number:  32/32  PCP: Mega Esparza MD  Independent Historians: Patient and family      HPI:  Chief Complaint: had concerns including Leg Swelling.     A complete HPI/ROS/PMH/PSH/SH/FH are unobtainable due to: None    Chronic or social conditions impacting patient care (Social Determinants of Health): None      Context: Brittany Villeda is a 89 y.o. female with a medical history of atrial fibrillation, COPD, intracranial hemorrhage, and thrombocytopenia with prior GI bleeding who presents to the ED c/o acute bilateral lower extremity edema with ecchymosis.  No known trauma.  No chest pain or shortness of breath.  No fevers.  Patient has noted increased swelling of the lower extremities over the last several days and actually saw her PCP yesterday who obtained labs and increased her furosemide.  Family noted that the edema was starting to leak and they also noted increasing purple color of bilateral feet thus presented to the ED for further evaluation.  The patient gets around with a walker says that she has been able to get around at home.  No exertional chest pain or shortness of breath reported.  No cough or fevers.  No change in bowel habits.  No black or bloody stools reported.  No other clear exacerbating relieving factors.  The patient generally wears compressive stockings but does not have them on today so that we could evaluate her legs.      Review of prior external notes (non-ED) -and- Review of prior external test results outside of this encounter:  Hospital discharge summary 11/2/2022 reviewed: Patient admitted for thrombocytopenia and lower GI bleed.  Patient with history of COPD and ICH as well as atrial fibrillation and essential hypertension  --------------------------------------------  TTE 8/11/2023 reviewed: Calculated LVEF 59.7%    PAST MEDICAL HISTORY  Active Ambulatory Problems     Diagnosis Date Noted    Non-toxic multinodular goiter 05/12/2016     Chronic midline low back pain with right-sided sciatica 05/12/2016    Essential hypertension 05/12/2016    Gastroesophageal reflux disease without esophagitis 05/12/2016    Cataract 05/12/2016    Pulmonary hypertension 06/30/2016    Diastolic dysfunction 06/30/2016    HUGO (obstructive sleep apnea) 06/30/2016    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    MGUS (monoclonal gammopathy of unknown significance) 09/16/2016    Ulcerative rectosigmoiditis without complication 11/30/2017    Lichen sclerosus 08/23/2017    Heart block 10/30/2018    Sleep-related hypoxia 12/22/2018    Bradycardia 12/29/2018    History of atrial fibrillation 03/19/2019    Pacemaker 03/19/2019    Paroxysmal SVT (supraventricular tachycardia) 05/08/2019    History of chest pain 05/08/2019    Palpitations 05/08/2019    Atrial fibrillation 10/06/2019    Ascending aortic aneurysm 08/24/2020    Mediastinal lymphadenopathy 09/09/2020    Anemia     Obesity (BMI 30-39.9)     Dysautonomia 04/09/2021    Hypercalcemia 04/11/2021    Hyperparathyroidism 04/28/2021    Choledocholithiasis 05/09/2021    Duodenal ulcer 05/13/2021    Hemorrhagic gastritis 05/13/2021    Exertional dyspnea 06/22/2021    COPD (chronic obstructive pulmonary disease)     Osteoarthritis of glenohumeral joint 07/12/2018    ICH (intracerebral hemorrhage) 08/05/2021    Lower GI bleed 11/01/2022    Thrombocytopenia 11/01/2022    Lichen sclerosus of female genitalia 03/13/2023    Senile atrophic vaginitis 03/13/2023    Left inguinal hernia 08/23/2023    Vulvar pain 02/01/2024     Resolved Ambulatory Problems     Diagnosis Date Noted    Abnormal weight loss 05/12/2016    Tachycardia 05/12/2016    Sciatica 05/12/2016    Nausea and vomiting 05/12/2016    Hyperthyroidism 05/12/2016    Fatigue 05/12/2016    Dizziness 05/12/2016    Diarrhea 05/12/2016    Abnormal thyroid stimulating hormone (TSH) level 05/12/2016    Abdominal pain 05/12/2016    Abnormal EKG 06/30/2016    Leukopenia 09/12/2016    Other chest  pain 12/24/2017    Shoulder arthritis 01/28/2019    Rectal bleeding 10/15/2019    Arthritis 12/13/2019    Immobility 11/20/2020    Left knee pain 11/20/2020    Chronic anticoagulation 11/20/2020    Hemarthrosis of left knee 11/20/2020    Generalized weakness 04/08/2021    Acute UTI (urinary tract infection) 04/08/2021    Weakness of both lower extremities 04/09/2021    Spondylosis of lumbosacral region without myelopathy or radiculopathy 04/09/2021    Macrocytosis 04/11/2021    Arthritis of right wrist 04/13/2021    Hypomagnesemia 04/13/2021    Essential hypertension 05/10/2021    Hyperglycemia 05/10/2021    Epigastric pain 05/12/2021    Functional quadriplegia 11/20/2020    Hip pain 04/12/2018    Other malaise and fatigue 05/25/2021    Shoulder pain 04/12/2018    Subdural hematoma 08/05/2021    Melena 09/29/2022    GI bleed 11/01/2022     Past Medical History:   Diagnosis Date    Abdominal aortic aneurysm     Arrhythmia     Asthma     Colitis     GERD (gastroesophageal reflux disease)     Hypertension     Hypertensive heart disease     Inguinal hernia     Kidney stone     Nephrolithiasis     Paroxysmal atrial fibrillation     Peptic ulceration     Pressure ulcer     PSVT (paroxysmal supraventricular tachycardia)     Rectal bleed     Sleep apnea     Systemic hypertension     Ventricular tachycardia          PAST SURGICAL HISTORY  Past Surgical History:   Procedure Laterality Date    BACK SURGERY      lumbar fusion    PAWAN HOLE Left 08/08/2021    Procedure: Left-sided pawan holes for evacuation of subdural hematoma;  Surgeon: Gustavo Callaway MD;  Location: [facility] MAIN OR;  Service: Neurosurgery;  Laterality: Left;    CARDIAC ELECTROPHYSIOLOGY PROCEDURE N/A 11/07/2018    Procedure: Pacemaker DC new   [place];  Surgeon: Jesus Granda MD;  Location: [facility] CATH INVASIVE LOCATION;  Service: Cardiology    CHOLECYSTECTOMY      COLONOSCOPY  06/16/2014    colitis, cryptitis,  tics, NBIH, TA w/low grade dysplasia     COLONOSCOPY N/A 10/28/2019    Procedure: COLONOSCOPY WITH COLD AND HOT POLYPECTOMIES;  Surgeon: Micaela English MD;  Location:  [facility];  Service: Gastroenterology    ENDOSCOPY N/A 05/13/2021    Procedure: ESOPHAGOGASTRODUODENOSCOPY with BX;  Surgeon: Stefan Mcfadden MD;  Location:  [facility];  Service: Gastroenterology;  Laterality: N/A;  EPIGASTRIC PAIN  --DUODENAL ULCER, HEMORRHAGIC GASTRITIS, HIATAL HERNIA     ENDOSCOPY N/A 10/11/2022    Procedure: ESOPHAGOGASTRODUODENOSCOPY;  Surgeon: Micaela English MD;  Location:  [facility];  Service: Gastroenterology;  Laterality: N/A;  PRE- MELENA  POST- ESOPHAGITIS    HEMORRHOIDECTOMY      HERNIA REPAIR      umbilical    HYSTERECTOMY      INGUINAL HERNIA REPAIR Left 9/1/2023    Procedure: INGUINAL HERNIA REPAIR LEFT;  Surgeon: Antonio Foster MD;  [place]  [facility] [place] [facility] [place]  Service: General;  Laterality: Left;    JOINT REPLACEMENT Bilateral     KNEES    MYOMECTOMY      PACEMAKER IMPLANTATION      SHOULDER SURGERY      SIGMOIDOSCOPY N/A 11/02/2022    Procedure: SIGMOIDOSCOPY FLEXIBLE WITH COLD BIOPSIES AND ASPIRATE;  Surgeon: Micaela English MD;  Location:  [facility];  Service: Gastroenterology;  Laterality: N/A;  PRE- RECTAL BLEEDING  POST- HEMORRHOIDS, DIVERTICULOSIS, COLITIS    SINUS SURGERY      TOE NAIL AMPUTATION  03/04/2019    TONSILLECTOMY           FAMILY HISTORY  Family History   Problem Relation Age of Onset    Diabetes Mother     Breast cancer Sister     Kidney cancer Sister     Heart disease Sister     Prostate cancer Brother     Prostate cancer Brother     Prostate cancer Brother     Malig Hyperthermia Neg Hx          SOCIAL HISTORY  Social History     Socioeconomic History    Marital status:     Number of children: 10    Years of education: High School   Tobacco Use    Smoking status: Former     Current packs/day: 0.00     Average packs/day: 1.5 packs/day for 12.0 years (18.0 ttl pk-yrs)     Types: Cigarettes      Start date:      Quit date:      Years since quittin.3     Passive exposure: Past    Smokeless tobacco: Never    Tobacco comments:     QUIT SMOKING    Vaping Use    Vaping status: Never Used   Substance and Sexual Activity    Alcohol use: No     Comment: caffeine - decaf coffee    Drug use: Never    Sexual activity: Defer         ALLERGIES  Codeine, Amitriptyline, Amoxicillin-pot clavulanate, Aspirin, Carisoprodol-aspirin-codeine, Iodinated contrast media, Latex, Naproxen, Nsaids, Soma compound with codeine [carisoprodol-aspirin-codeine], Sulfa antibiotics, and Tramadol      REVIEW OF SYSTEMS  Review of Systems  Included in HPI  All systems reviewed and negative except for those discussed in HPI.      PHYSICAL EXAM    I have reviewed the triage vital signs and nursing notes.    ED Triage Vitals [24 1743]   Temp Heart Rate Resp BP SpO2   97.1 °F (36.2 °C) 68 18 -- 98 %      Temp src Heart Rate Source Patient Position BP Location FiO2 (%)   Tympanic -- -- -- --       Physical Exam    Physical Exam   Constitutional: No distress.  Nontoxic.  Pleasant.  HENT:  Head: Normocephalic and atraumatic.   Oropharynx: Mucous membranes are moist.   Eyes: . No scleral icterus. No conjunctival pallor.  Neck: Normal range of motion. Neck supple.   Cardiovascular: Pink warm and well perfused throughout.    Pulmonary/Chest: No respiratory distress.  No tachypnea or increased work of breathing appreciated.  Clear to auscultation  Abdominal: Soft. There is no tenderness. There is no rebound and no guarding.   Musculoskeletal: Moves all extremities equally.  4+ bilateral pitting edema up to the knees.  Some mild petechial/ecchymotic lesions bilateral lower extremities.  Some weeping noted from at least 1 lesion on each leg.  No cellulitic change.  No calf tenderness.  Neurological: Alert and oriented.  No acute focal deficit appreciated.  Skin: Skin is pink, warm, and dry.   Psychiatric: Mood and affect normal.    Nursing note and vitals reviewed.             LAB RESULTS  Recent Results (from the past 24 hour(s))   Comprehensive Metabolic Panel    Collection Time: 04/23/24  6:45 PM    Specimen: Blood   Result Value Ref Range    Glucose 107 (H) 65 - 99 mg/dL    BUN 15 8 - 23 mg/dL    Creatinine 0.85 0.57 - 1.00 mg/dL    Sodium 142 136 - 145 mmol/L    Potassium 4.7 3.5 - 5.2 mmol/L    Chloride 107 98 - 107 mmol/L    CO2 25.9 22.0 - 29.0 mmol/L    Calcium 10.2 8.6 - 10.5 mg/dL    Total Protein 6.5 6.0 - 8.5 g/dL    Albumin 3.4 (L) 3.5 - 5.2 g/dL    ALT (SGPT) 9 1 - 33 U/L    AST (SGOT) 13 1 - 32 U/L    Alkaline Phosphatase 60 39 - 117 U/L    Total Bilirubin 1.1 0.0 - 1.2 mg/dL    Globulin 3.1 gm/dL    A/G Ratio 1.1 g/dL    BUN/Creatinine Ratio 17.6 7.0 - 25.0    Anion Gap 9.1 5.0 - 15.0 mmol/L    eGFR 65.6 >60.0 mL/min/1.73   BNP    Collection Time: 04/23/24  6:45 PM    Specimen: Blood   Result Value Ref Range    proBNP 579.0 0.0 - 1,800.0 pg/mL   CBC Auto Differential    Collection Time: 04/23/24  6:45 PM    Specimen: Blood   Result Value Ref Range    WBC 3.13 (L) 3.40 - 10.80 10*3/mm3    RBC 3.60 (L) 3.77 - 5.28 10*6/mm3    Hemoglobin 12.6 12.0 - 15.9 g/dL    Hematocrit 38.4 34.0 - 46.6 %    .7 (H) 79.0 - 97.0 fL    MCH 35.0 (H) 26.6 - 33.0 pg    MCHC 32.8 31.5 - 35.7 g/dL    RDW 13.4 12.3 - 15.4 %    RDW-SD 53.0 37.0 - 54.0 fl    MPV 9.3 6.0 - 12.0 fL    Platelets 108 (L) 140 - 450 10*3/mm3    nRBC 0.0 0.0 - 0.2 /100 WBC   Manual Differential    Collection Time: 04/23/24  6:45 PM    Specimen: Blood   Result Value Ref Range    Neutrophil % 39.8 (L) 42.7 - 76.0 %    Lymphocyte % 47.3 (H) 19.6 - 45.3 %    Monocyte % 8.6 5.0 - 12.0 %    Basophil % 1.1 0.0 - 1.5 %    Atypical Lymphocyte % 3.2 0.0 - 5.0 %    Neutrophils Absolute 1.25 (L) 1.70 - 7.00 10*3/mm3    Lymphocytes Absolute 1.58 0.70 - 3.10 10*3/mm3    Monocytes Absolute 0.27 0.10 - 0.90 10*3/mm3    Basophils Absolute 0.03 0.00 - 0.20 10*3/mm3    Anisocytosis  Mod/2+ None Seen    Macrocytes Mod/2+ None Seen    Smudge Cells Slight/1+ None Seen    Platelet Morphology Normal Normal         RADIOLOGY  No Radiology Exams Resulted Within Past 24 Hours      MEDICATIONS GIVEN IN ER  Medications   sodium chloride 0.9 % flush 10 mL (has no administration in time range)   furosemide (LASIX) injection 80 mg (has no administration in time range)         ORDERS PLACED DURING THIS VISIT:  Orders Placed This Encounter   Procedures    Comprehensive Metabolic Panel    BNP    CBC Auto Differential    Manual Differential    LCG (on-call MD unless specified)    Insert Peripheral IV    Initiate ED Observation Status    CBC & Differential         OUTPATIENT MEDICATION MANAGEMENT:  Current Facility-Administered Medications Ordered in Epic   Medication Dose Route Frequency Provider Last Rate Last Admin    furosemide (LASIX) injection 80 mg  80 mg Intravenous Once Rusty Arnold MD        sodium chloride 0.9 % flush 10 mL  10 mL Intravenous PRN Rusty Arnold MD         Current Outpatient Medications Ordered in Epic   Medication Sig Dispense Refill    acetaminophen (TYLENOL) 500 MG tablet Take 1 tablet by mouth Every 6 (Six) Hours As Needed for Mild Pain .      albuterol sulfate  (90 Base) MCG/ACT inhaler Inhale 2 puffs Every 4 (Four) Hours As Needed for Wheezing (or cough). 18 g 0    clobetasol (TEMOVATE) 0.05 % ointment Apply 1 Application topically to the appropriate area as directed 2 (Two) Times a Day. 60 g 1    clonazePAM (KlonoPIN) 0.5 MG tablet Take 1 tablet by mouth 2 (Two) Times a Day As Needed for Anxiety (Grief related anxiety). 10 tablet 0    ELDERBERRY PO Take 2 tablets by mouth Daily. HOLDING FOR DOS      furosemide (LASIX) 20 MG tablet Take 1 tablet by mouth Daily As Needed (edema). 30 tablet 1    Magnesium Oxide -Mg Supplement 400 (240 Mg) MG tablet Take 1 tablet by mouth Daily. 90 tablet 1    mesalamine (APRISO) 0.375 g 24 hr capsule Take 4 capsules by mouth Daily. 360  capsule 0    nystatin (MYCOSTATIN) 185891 UNIT/GM ointment       pantoprazole (PROTONIX) 40 MG EC tablet Take 1 tablet by mouth Daily. 90 tablet 1    potassium chloride (K-DUR,KLOR-CON) 10 MEQ CR tablet Take 1 tablet by mouth Daily. 90 tablet 1    psyllium (METAMUCIL) 58.6 % packet Take 1 packet by mouth Daily.      tacrolimus (PROTOPIC) 0.1 % ointment Apply 1 Application topically to the appropriate area as directed 2 (Two) Times a Day.      vitamin B-12 (CYANOCOBALAMIN) 1000 MCG tablet Take 1 tablet by mouth Daily. 90 tablet 1         PROCEDURES  Procedures            PROGRESS, DATA ANALYSIS, CONSULTS, AND MEDICAL DECISION MAKING  All labs have been independently interpreted by me.  All radiology studies have been reviewed by me. All EKG's have been independently viewed and interpreted by me.  Discussion below represents my analysis of pertinent findings related to patient's condition, differential diagnosis, treatment plan and final disposition.    Differential diagnosis:   My diagnosis for lower extremity pain and injury includes but is not limited to hip fracture, femur fracture, hip dislocation, hip contusion, hip sprain, hip strain, pelvic fracture, ischio-tibial band pain, ischio-tibial band bursitis, knee sprain, patella dislocation, knee dislocation, internal derangement of knee, fractures of the femur, tibia, fibula, ankle, foot and digits, ankle sprain, ankle dislocation, Lisfranc fracture, fracture dislocations of the digits, pulmonary embolism, claudication, peripheral vascular disease, gout, osteoarthritis, rheumatoid arthritis, bursitis, septic joint, poly-rheumatica, polyarthralgia and other inflammatory or infectious disease processes.      Clinical Scores:                  ED Course as of 04/1935 Tue Apr 23, 2024 1921 proBNP: 579.0 [RS]   1921 Glucose(!): 107 [RS]   1921 BUN: 15 [RS]   1921 Creatinine: 0.85 [RS]   1921 Sodium: 142 [RS]   1921 Potassium: 4.7 [RS]   1921 WBC(!): 3.13 [RS]    1921 Hemoglobin: 12.6 [RS]   1921 Platelets(!): 108 [RS]   1924 CONSULT        Provider: MAXX ARRINGTON    Discussion: Reviewed patient history, ED presentation and evaluation as well as my plan for diuresis and cardiology consultation.  Agreeable to examination to the observation unit for further evaluation and treatment.    Agreeable c treatment and planned disposition.         [RS]   1933 CONSULT        Provider: Dr. Santos - MARYBETH    Discussion: Reviewed patient history, ED presentation and evaluation.  Agreeable with plan.  Available for consultation as needed.    Agreeable c treatment and planned disposition.         [RS]      ED Course User Index  [RS] Rusty Arnold MD         Prescription drug monitoring program review:     AS OF 19:35 EDT VITALS:    BP - 125/76  HR - 60  TEMP - 97.1 °F (36.2 °C) (Tympanic)  O2 SATS - 96%    COMPLEXITY OF CARE  The patient requires admission.      DIAGNOSIS  Final diagnoses:   Bilateral lower extremity edema   Thrombocytopenia         DISPOSITION  ED Disposition       ED Disposition   Decision to Admit    Condition   --    Comment   --                  ADMISSION    Discussed treatment plan and reason for admission with pt/family and admitting physician.  Pt/family voiced understanding of the plan for admission for further testing/treatment as needed.       Please note that portions of this document were completed with a voice recognition program.    Note Disclaimer: At [facility], we believe that sharing information builds trust and better relationships. You are receiving this note because you recently visited [facility]. It is possible you will see health information before a provider has talked with you about it. This kind of information can be easy to misunderstand. To help you fully understand what it means for your health, we urge you to discuss this note with your provider.         Rusty Arnold MD  04/1935

## 2024-04-23 NOTE — PROGRESS NOTES
Clinical Pharmacy Services: Medication History    Brittany Villeda is a 89 y.o. female presenting to [facility] for   Chief Complaint   Patient presents with    Leg Swelling       She  has a past medical history of Abdominal aortic aneurysm, Anemia, Arrhythmia, Arthritis, Asthma, Bradycardia, Choledocholithiasis (05/09/2021), Colitis, Diastolic dysfunction, Essential hypertension (05/12/2016), GERD (gastroesophageal reflux disease), Heart block, Hypertension, Hypertensive heart disease, Hyperthyroidism, Inguinal hernia, Kidney stone, Leukopenia, MGUS (monoclonal gammopathy of unknown significance), Nephrolithiasis, Pacemaker, Paroxysmal atrial fibrillation, Peptic ulceration, Pressure ulcer, PSVT (paroxysmal supraventricular tachycardia), Pulmonary hypertension, Rectal bleed, Sleep apnea, Subdural hematoma, Systemic hypertension, Trifascicular block, Ulcerative rectosigmoiditis without complication, and Ventricular tachycardia.    Allergies as of 04/23/2024 - Reviewed 04/23/2024   Allergen Reaction Noted    Codeine Hallucinations 11/30/2017    Amitriptyline Rash 05/12/2016    Amoxicillin-pot clavulanate Rash 05/12/2016    Aspirin Unknown - Low Severity 05/12/2016    Carisoprodol-aspirin-codeine Palpitations 09/12/2016    Iodinated contrast media Rash 05/12/2016    Latex Rash 09/12/2016    Naproxen Rash 05/12/2016    Nsaids Unknown - Low Severity 05/12/2016    Soma compound with codeine [carisoprodol-aspirin-codeine] Rash 09/12/2016    Sulfa antibiotics Rash 05/12/2016    Tramadol Palpitations 04/12/2019       Medication information was obtained from: Patient & Pharmacy   Pharmacy and Phone Number: [facility] [phone]    Prior to Admission Medications       Prescriptions Last Dose Informant Patient Reported? Taking?    acetaminophen (TYLENOL) 500 MG tablet  Self No Yes    Take 1 tablet by mouth Every 6 (Six) Hours As Needed for Mild Pain .    albuterol sulfate  (90 Base) MCG/ACT inhaler   No  No    Inhale 2 puffs Every 4 (Four) Hours As Needed for Wheezing (or cough).    clobetasol (TEMOVATE) 0.05 % ointment  Self No Yes    Apply 1 Application topically to the appropriate area as directed 2 (Two) Times a Day.    clonazePAM (KlonoPIN) 0.5 MG tablet  Self No Yes    Take 1 tablet by mouth 2 (Two) Times a Day As Needed for Anxiety (Grief related anxiety).    ELDERBERRY PO   Yes No    Take 2 tablets by mouth Daily. HOLDING FOR DOS    furosemide (LASIX) 20 MG tablet   No No    Take 1 tablet by mouth Daily As Needed (edema).    furosemide (LASIX) 40 MG tablet  Self Yes Yes    Take 1 tablet by mouth Daily.    Magnesium Oxide -Mg Supplement 400 (240 Mg) MG tablet  Self No Yes    Take 1 tablet by mouth Daily.    mesalamine (APRISO) 0.375 g 24 hr capsule  Self No Yes    Take 4 capsules by mouth Daily.    nystatin (MYCOSTATIN) 353982 UNIT/GM ointment  Self Yes Yes    Apply 1 Application topically to the appropriate area as directed As Needed.    pantoprazole (PROTONIX) 40 MG EC tablet  Self No Yes    Take 1 tablet by mouth Daily.    potassium chloride (K-DUR,KLOR-CON) 10 MEQ CR tablet  Self No Yes    Take 1 tablet by mouth Daily.    Patient taking differently:  Take 2 tablets by mouth Daily.    psyllium (METAMUCIL) 58.6 % packet   Yes No    Take 1 packet by mouth Daily.    tacrolimus (PROTOPIC) 0.1 % ointment  Self Yes Yes    Apply 1 Application topically to the appropriate area as directed 2 (Two) Times a Day.    valACYclovir (VALTREX) 1000 MG tablet  Self Yes Yes    Take 1 tablet by mouth Daily.    vitamin B-12 (CYANOCOBALAMIN) 1000 MCG tablet  Self No Yes    Take 1 tablet by mouth Daily.              Medication notes:     This medication list is complete to the best of my knowledge as of 4/23/2024    Please call if questions.    Maryuri Graham  Medication History Technician   [phone]    4/23/2024 19:54 EDT

## 2024-04-23 NOTE — TELEPHONE ENCOUNTER
Caller: Concepcion Diana    Relationship: Emergency Contact    Best call back number: [phone]    What is the best time to reach you: ANYTIME    Who are you requesting to speak with (clinical staff, provider,  specific staff member): CLINICAL    What was the call regarding:CONCEPCION IS RETURNING A CALL REGARDING LAB RESULTS.

## 2024-04-24 ENCOUNTER — APPOINTMENT (OUTPATIENT)
Dept: CARDIOLOGY | Facility: HOSPITAL | Age: 89
End: 2024-04-24
Payer: MEDICARE

## 2024-04-24 PROBLEM — I50.31 ACUTE DIASTOLIC CONGESTIVE HEART FAILURE: Status: ACTIVE | Noted: 2024-04-24

## 2024-04-24 PROBLEM — I48.0 PAROXYSMAL ATRIAL FIBRILLATION: Status: ACTIVE | Noted: 2019-10-06

## 2024-04-24 PROBLEM — I50.33 ACUTE ON CHRONIC DIASTOLIC CONGESTIVE HEART FAILURE: Status: ACTIVE | Noted: 2024-04-24

## 2024-04-24 PROBLEM — R60.0 BILATERAL LOWER EXTREMITY EDEMA: Status: ACTIVE | Noted: 2024-04-24

## 2024-04-24 LAB
ANION GAP SERPL CALCULATED.3IONS-SCNC: 7.1 MMOL/L (ref 5–15)
AORTIC DIMENSIONLESS INDEX: 0.8 (DI)
ASCENDING AORTA: 3.4 CM
BH CV ECHO MEAS - ACS: 2.18 CM
BH CV ECHO MEAS - AO MAX PG: 5.6 MMHG
BH CV ECHO MEAS - AO MEAN PG: 2.9 MMHG
BH CV ECHO MEAS - AO ROOT DIAM: 3.5 CM
BH CV ECHO MEAS - AO V2 MAX: 118.7 CM/SEC
BH CV ECHO MEAS - AO V2 VTI: 21.7 CM
BH CV ECHO MEAS - AVA(I,D): 2.6 CM2
BH CV ECHO MEAS - EDV(CUBED): 39.1 ML
BH CV ECHO MEAS - EDV(MOD-SP2): 59 ML
BH CV ECHO MEAS - EDV(MOD-SP4): 53 ML
BH CV ECHO MEAS - EF(MOD-BP): 55 %
BH CV ECHO MEAS - EF(MOD-SP2): 57.6 %
BH CV ECHO MEAS - EF(MOD-SP4): 56.6 %
BH CV ECHO MEAS - ESV(CUBED): 10.1 ML
BH CV ECHO MEAS - ESV(MOD-SP2): 25 ML
BH CV ECHO MEAS - ESV(MOD-SP4): 23 ML
BH CV ECHO MEAS - FS: 36.3 %
BH CV ECHO MEAS - IVS/LVPW: 1.02 CM
BH CV ECHO MEAS - IVSD: 1.07 CM
BH CV ECHO MEAS - LA A2CS (ATRIAL LENGTH): 5.5 CM
BH CV ECHO MEAS - LA A4C LENGTH: 5.5 CM
BH CV ECHO MEAS - LAT PEAK E' VEL: 10 CM/SEC
BH CV ECHO MEAS - LV MASS(C)D: 107.2 GRAMS
BH CV ECHO MEAS - LV MAX PG: 3.6 MMHG
BH CV ECHO MEAS - LV MEAN PG: 1.95 MMHG
BH CV ECHO MEAS - LV V1 MAX: 95.3 CM/SEC
BH CV ECHO MEAS - LV V1 VTI: 18.3 CM
BH CV ECHO MEAS - LVIDD: 3.4 CM
BH CV ECHO MEAS - LVIDS: 2.16 CM
BH CV ECHO MEAS - LVOT AREA: 3.1 CM2
BH CV ECHO MEAS - LVOT DIAM: 1.98 CM
BH CV ECHO MEAS - LVPWD: 1.05 CM
BH CV ECHO MEAS - MED PEAK E' VEL: 7.17 CM/SEC
BH CV ECHO MEAS - MR MAX PG: 25.7 MMHG
BH CV ECHO MEAS - MR MAX VEL: 249.9 CM/SEC
BH CV ECHO MEAS - MV A DUR: 0.07 SEC
BH CV ECHO MEAS - MV A MAX VEL: 36.5 CM/SEC
BH CV ECHO MEAS - MV DEC SLOPE: 508.2 CM/SEC2
BH CV ECHO MEAS - MV DEC TIME: 0.16 SEC
BH CV ECHO MEAS - MV E MAX VEL: 85.5 CM/SEC
BH CV ECHO MEAS - MV E/A: 2.34
BH CV ECHO MEAS - MV MEAN PG: 0.95 MMHG
BH CV ECHO MEAS - MV P1/2T: 59.1 MSEC
BH CV ECHO MEAS - MV V2 VTI: 22.3 CM
BH CV ECHO MEAS - MVA(P1/2T): 3.7 CM2
BH CV ECHO MEAS - MVA(VTI): 2.5 CM2
BH CV ECHO MEAS - PA ACC SLOPE: 688.2 CM/SEC2
BH CV ECHO MEAS - PA ACC TIME: 0.08 SEC
BH CV ECHO MEAS - PA V2 MAX: 84.8 CM/SEC
BH CV ECHO MEAS - RAP SYSTOLE: 3 MMHG
BH CV ECHO MEAS - RV MAX PG: 1.29 MMHG
BH CV ECHO MEAS - RV V1 MAX: 56.8 CM/SEC
BH CV ECHO MEAS - RV V1 VTI: 10.9 CM
BH CV ECHO MEAS - RVSP: 38 MMHG
BH CV ECHO MEAS - SUP REN AO DIAM: 1.6 CM
BH CV ECHO MEAS - SV(LVOT): 56.7 ML
BH CV ECHO MEAS - SV(MOD-SP2): 34 ML
BH CV ECHO MEAS - SV(MOD-SP4): 30 ML
BH CV ECHO MEAS - TAPSE (>1.6): 1.4 CM
BH CV ECHO MEAS - TR MAX PG: 35 MMHG
BH CV ECHO MEAS - TR MAX VEL: 295.6 CM/SEC
BH CV ECHO MEASUREMENTS AVERAGE E/E' RATIO: 9.96
BH CV ECHO SHUNT ASSESSMENT PERFORMED (HIDDEN SCRIPTING): 1
BH CV XLRA - RV BASE: 4 CM
BH CV XLRA - RV LENGTH: 6 CM
BH CV XLRA - RV MID: 2.2 CM
BH CV XLRA - TDI S': 12.5 CM/SEC
BUN SERPL-MCNC: 16 MG/DL (ref 8–23)
BUN/CREAT SERPL: 19.8 (ref 7–25)
CALCIUM SPEC-SCNC: 10.2 MG/DL (ref 8.6–10.5)
CHLORIDE SERPL-SCNC: 106 MMOL/L (ref 98–107)
CO2 SERPL-SCNC: 28.9 MMOL/L (ref 22–29)
CREAT SERPL-MCNC: 0.81 MG/DL (ref 0.57–1)
DEPRECATED RDW RBC AUTO: 51.5 FL (ref 37–54)
EGFRCR SERPLBLD CKD-EPI 2021: 69.5 ML/MIN/1.73
ERYTHROCYTE [DISTWIDTH] IN BLOOD BY AUTOMATED COUNT: 13.4 % (ref 12.3–15.4)
GLUCOSE SERPL-MCNC: 89 MG/DL (ref 65–99)
HCT VFR BLD AUTO: 38.6 % (ref 34–46.6)
HGB BLD-MCNC: 12.8 G/DL (ref 12–15.9)
LEFT ATRIUM VOLUME INDEX: 38.8 ML/M2
LEFT ATRIUM VOLUME: 71 ML
MCH RBC QN AUTO: 34.9 PG (ref 26.6–33)
MCHC RBC AUTO-ENTMCNC: 33.2 G/DL (ref 31.5–35.7)
MCV RBC AUTO: 105.2 FL (ref 79–97)
PLATELET # BLD AUTO: 113 10*3/MM3 (ref 140–450)
PMV BLD AUTO: 8.8 FL (ref 6–12)
POTASSIUM SERPL-SCNC: 4.1 MMOL/L (ref 3.5–5.2)
QT INTERVAL: 488 MS
QTC INTERVAL: 488 MS
RBC # BLD AUTO: 3.67 10*6/MM3 (ref 3.77–5.28)
SODIUM SERPL-SCNC: 142 MMOL/L (ref 136–145)
URATE SERPL-MCNC: 6.9 MG/DL (ref 2.4–5.7)
WBC NRBC COR # BLD AUTO: 3.05 10*3/MM3 (ref 3.4–10.8)

## 2024-04-24 PROCEDURE — G0378 HOSPITAL OBSERVATION PER HR: HCPCS

## 2024-04-24 PROCEDURE — 25010000002 FUROSEMIDE PER 20 MG: Performed by: INTERNAL MEDICINE

## 2024-04-24 PROCEDURE — 84550 ASSAY OF BLOOD/URIC ACID: CPT | Performed by: HOSPITALIST

## 2024-04-24 PROCEDURE — 25510000001 PERFLUTREN (DEFINITY) 8.476 MG IN SODIUM CHLORIDE (PF) 0.9 % 10 ML INJECTION: Performed by: INTERNAL MEDICINE

## 2024-04-24 PROCEDURE — 85027 COMPLETE CBC AUTOMATED: CPT | Performed by: NURSE PRACTITIONER

## 2024-04-24 PROCEDURE — 93306 TTE W/DOPPLER COMPLETE: CPT | Performed by: INTERNAL MEDICINE

## 2024-04-24 PROCEDURE — 80048 BASIC METABOLIC PNL TOTAL CA: CPT | Performed by: NURSE PRACTITIONER

## 2024-04-24 PROCEDURE — 93306 TTE W/DOPPLER COMPLETE: CPT

## 2024-04-24 PROCEDURE — 97162 PT EVAL MOD COMPLEX 30 MIN: CPT

## 2024-04-24 PROCEDURE — 93010 ELECTROCARDIOGRAM REPORT: CPT | Performed by: INTERNAL MEDICINE

## 2024-04-24 PROCEDURE — 99214 OFFICE O/P EST MOD 30 MIN: CPT | Performed by: INTERNAL MEDICINE

## 2024-04-24 PROCEDURE — 97530 THERAPEUTIC ACTIVITIES: CPT

## 2024-04-24 PROCEDURE — 96376 TX/PRO/DX INJ SAME DRUG ADON: CPT

## 2024-04-24 PROCEDURE — 93005 ELECTROCARDIOGRAM TRACING: CPT | Performed by: INTERNAL MEDICINE

## 2024-04-24 RX ORDER — FUROSEMIDE 10 MG/ML
40 INJECTION INTRAMUSCULAR; INTRAVENOUS ONCE
Status: COMPLETED | OUTPATIENT
Start: 2024-04-24 | End: 2024-04-24

## 2024-04-24 RX ORDER — FUROSEMIDE 10 MG/ML
80 INJECTION INTRAMUSCULAR; INTRAVENOUS ONCE
Status: COMPLETED | OUTPATIENT
Start: 2024-04-24 | End: 2024-04-24

## 2024-04-24 RX ORDER — NITROGLYCERIN 0.4 MG/1
0.4 TABLET SUBLINGUAL
Status: DISCONTINUED | OUTPATIENT
Start: 2024-04-24 | End: 2024-04-25 | Stop reason: HOSPADM

## 2024-04-24 RX ORDER — ONDANSETRON 2 MG/ML
4 INJECTION INTRAMUSCULAR; INTRAVENOUS EVERY 6 HOURS PRN
Status: DISCONTINUED | OUTPATIENT
Start: 2024-04-24 | End: 2024-04-25 | Stop reason: HOSPADM

## 2024-04-24 RX ADMIN — VALACYCLOVIR HYDROCHLORIDE 1000 MG: 500 TABLET, FILM COATED ORAL at 09:03

## 2024-04-24 RX ADMIN — MESALAMINE 1.5 G: 0.38 CAPSULE, EXTENDED RELEASE ORAL at 09:04

## 2024-04-24 RX ADMIN — FUROSEMIDE 40 MG: 10 INJECTION, SOLUTION INTRAMUSCULAR; INTRAVENOUS at 17:07

## 2024-04-24 RX ADMIN — FUROSEMIDE 80 MG: 10 INJECTION, SOLUTION INTRAMUSCULAR; INTRAVENOUS at 14:28

## 2024-04-24 RX ADMIN — PERFLUTREN 2 ML: 6.52 INJECTION, SUSPENSION INTRAVENOUS at 13:11

## 2024-04-24 RX ADMIN — PANTOPRAZOLE SODIUM 40 MG: 40 TABLET, DELAYED RELEASE ORAL at 09:02

## 2024-04-24 RX ADMIN — MAGNESIUM OXIDE 400 MG (241.3 MG MAGNESIUM) TABLET 400 MG: TABLET at 09:02

## 2024-04-24 RX ADMIN — Medication 10 ML: at 09:10

## 2024-04-24 NOTE — PLAN OF CARE
Goal Outcome Evaluation:  Plan of Care Reviewed With: patient      Patient admitted to  observation unit for evaluation and treatment of bilateral lower extremity edema. Lasix given in ER. Pt diuresing well. Purewick in place.  Cardiology consulted. Vital signs stable. Daughter at bedside. Will monitor.

## 2024-04-24 NOTE — H&P
[facility]   HISTORY AND PHYSICAL    Patient Name: Brittany Villeda  : 1935  MRN: [id number]  Primary Care Physician:  Mega Esparza MD  Date of admission: 2024    Subjective   Subjective     Chief Complaint:   Chief Complaint   Patient presents with    Leg Swelling         HPI:    Brittany Villeda is a [place] 89 y.o. female with a medical history of atrial fibrillation, COPD, pacemaker, untreated HUGO, hypertension, intracranial hemorrhage, and thrombocytopenia with prior GI bleeding who presented to the emergency department with a complaint of bilateral lower extremity edema and was admitted to the ED observation unit for further evaluation and workup.  Daughter states patient has persistent lower extremity edema however, swelling is much worse today pitting edema and weeping from bilateral lower extremities.  Patient was seen in her PCP office yesterday and instructed to double up on her Lasix taking 40 mg daily.  Patient has compression stockings at home for her persistent edema.  Denies any complaints of chest pain or shortness of air.    ED work-up: .  Albumin 3.4.  Chest x-ray reported mild cardiomegaly. Underinflation of the lungs which appear clear.    Upon admission to the ED observation unit, patient is awake, alert and oriented x 4.  She denies any complaints of pain or shortness of breath.  Patient had received Lasix 80 mg IV in the ED with over 2000 mL urine output at this time.  Cardiology consult in the morning.    Review of Systems   All systems were reviewed and negative except for: As documented in HPI.    Personal History     Past Medical History:   Diagnosis Date    Abdominal aortic aneurysm     Anemia     Arrhythmia     Arthritis     Asthma     Bradycardia     Choledocholithiasis 2021    Colitis     Diastolic dysfunction     Essential hypertension 2016    GERD (gastroesophageal reflux disease)     Heart block     Hypertension     Hypertensive heart  disease     Hyperthyroidism     REPORTS ENLARGED    Inguinal hernia     Kidney stone     Leukopenia     MGUS (monoclonal gammopathy of unknown significance)     Nephrolithiasis     Pacemaker     Paroxysmal atrial fibrillation     Peptic ulceration     Pressure ulcer     REPORTS ON COCCYX. INSTR TO NOTIFY DR BAIRES'S OFFICE    PSVT (paroxysmal supraventricular tachycardia)     Pulmonary hypertension     Rectal bleed     Sleep apnea     NO DEVICE    Subdural hematoma     Systemic hypertension     Trifascicular block     Ulcerative rectosigmoiditis without complication     Ventricular tachycardia     nonsustained       Past Surgical History:   Procedure Laterality Date    BACK SURGERY      lumbar fusion    DUSTY HOLE Left 08/08/2021    Procedure: Left-sided dusty holes for evacuation of subdural hematoma;  Surgeon: Gustavo Callaway MD;  Location: [facility] MAIN OR;  Service: Neurosurgery;  Laterality: Left;    CARDIAC ELECTROPHYSIOLOGY PROCEDURE N/A 11/07/2018    Procedure: Pacemaker DC new   [place];  Surgeon: Jesus Granda MD;  Location: [facility] CATH INVASIVE LOCATION;  Service: Cardiology    CHOLECYSTECTOMY      COLONOSCOPY  06/16/2014    colitis, cryptitis,  tics, NBIH, TA w/low grade dysplasia    COLONOSCOPY N/A 10/28/2019    Procedure: COLONOSCOPY WITH COLD AND HOT POLYPECTOMIES;  Surgeon: Micaela English MD;  Location: [facility] ENDOSCOPY;  Service: Gastroenterology    ENDOSCOPY N/A 05/13/2021    Procedure: ESOPHAGOGASTRODUODENOSCOPY with BX;  Surgeon: Stefan Mcfadden MD;  Location: [facility] ENDOSCOPY;  Service: Gastroenterology;  Laterality: N/A;  EPIGASTRIC PAIN  --DUODENAL ULCER, HEMORRHAGIC GASTRITIS, HIATAL HERNIA     ENDOSCOPY N/A 10/11/2022    Procedure: ESOPHAGOGASTRODUODENOSCOPY;  Surgeon: Micaela English MD;  Location: [facility] ENDOSCOPY;  Service: Gastroenterology;  Laterality: N/A;  PRE- MELENA  POST- ESOPHAGITIS    HEMORRHOIDECTOMY      HERNIA REPAIR      umbilical    HYSTERECTOMY       INGUINAL HERNIA REPAIR Left 9/1/2023    Procedure: INGUINAL HERNIA REPAIR LEFT;  Surgeon: Antonio Foster MD;  Location:  [facility];  Service: General;  Laterality: Left;    JOINT REPLACEMENT Bilateral     KNEES    MYOMECTOMY      PACEMAKER IMPLANTATION      SHOULDER SURGERY      SIGMOIDOSCOPY N/A 11/02/2022    Procedure: SIGMOIDOSCOPY FLEXIBLE WITH COLD BIOPSIES AND ASPIRATE;  Surgeon: Micaela English MD;  Location: [facility] ENDOSCOPY;  Service: Gastroenterology;  Laterality: N/A;  PRE- RECTAL BLEEDING  POST- HEMORRHOIDS, DIVERTICULOSIS, COLITIS    SINUS SURGERY      TOE NAIL AMPUTATION  03/04/2019    TONSILLECTOMY         Family History: family history includes Breast cancer in her sister; Diabetes in her mother; Heart disease in her sister; Kidney cancer in her sister; Prostate cancer in her brother, brother, and brother. Otherwise pertinent FHx was reviewed and not pertinent to current issue.    Social History:  reports that she quit smoking about 59 years ago. Her smoking use included cigarettes. She started smoking about 71 years ago. She has a 18 pack-year smoking history. She has been exposed to tobacco smoke. She has never used smokeless tobacco. She reports that she does not drink alcohol and does not use drugs.    Home Medications:  Elderberry, Magnesium Oxide -Mg Supplement, acetaminophen, albuterol sulfate HFA, clobetasol, clonazePAM, furosemide, lidocaine, mesalamine, nystatin, pantoprazole, potassium chloride, psyllium, tacrolimus, valACYclovir, and vitamin B-12    Allergies:  Allergies   Allergen Reactions    Codeine Hallucinations     Tolerates hydromorphone    Amitriptyline Rash    Amoxicillin-Pot Clavulanate Rash    Aspirin Unknown - Low Severity     Patient doesn't know why    Carisoprodol-Aspirin-Codeine Palpitations    Iodinated Contrast Media Rash    Latex Rash    Naproxen Rash    Nsaids Unknown - Low Severity     UNSURE OF REACTION    Soma Compound With Codeine  [Carisoprodol-Aspirin-Codeine] Rash    Sulfa Antibiotics Rash    Tramadol Palpitations     heart races        Objective   Objective     Vitals:   Temp:  [97.1 °F (36.2 °C)-98.2 °F (36.8 °C)] 98.2 °F (36.8 °C)  Heart Rate:  [60-68] 60  Resp:  [18] 18  BP: (124-141)/(65-82) 135/66  Physical Exam    Constitutional: Awake, alert   Eyes: PERRLA, sclerae anicteric, no conjunctival injection   HENT: NCAT, mucous membranes moist   Neck: Supple, no thyromegaly, no lymphadenopathy, trachea midline   Respiratory: Clear to auscultation bilaterally, nonlabored respirations    Cardiovascular: RRR, no murmurs, rubs, or gallops, palpable pedal pulses bilaterally   Gastrointestinal: Abdomen soft, nontender, nondistended   Musculoskeletal: +3 pitting edema to bilateral lower extremities with weeping   Psychiatric: Appropriate affect, cooperative   Neurologic: Oriented x 3, strength symmetric in all extremities, Cranial Nerves grossly intact to confrontation, speech clear   Skin: No rashes     Result Review    Result Review:  I have personally reviewed the results from the time of this admission to 4/23/2024 23:14 EDT and agree with these findings:  [x]  Laboratory list / accordion  []  Microbiology  [x]  Radiology  [x]  EKG/Telemetry   []  Cardiology/Vascular   []  Pathology  [x]  Old records  []  Other:  Most notable findings include: +3 pitting edema to bilateral lower extremities with weeping      Assessment & Plan   Assessment / Plan     Brief Patient Summary:  Brittany Villeda is a 89 y.o. female who was admitted to the ED observation unit for further evaluation and workup of bilateral lower extremity edema.    Active Hospital Problems:  Active Hospital Problems    Diagnosis     **Bilateral leg edema      Plan:     BLE edema  Lasix 80 mg IV given in ED  I&O  Daily weight  Monitor vital signs  Cardiology consultation    Long-standing-persistent A-Fib  Rate controlled  Pacemaker  Cardiology office note: She is felt to be poor  candidate for anticoagulation due to history of GI bleed and decreased mobility.       DVT prophylaxis:  No DVT prophylaxis order currently exists.        CODE STATUS:       Admission Status:  I believe this patient meets observation status.    Electronically signed by SHAHID Rojas, 04/23/24, 8:13 PM EDT.        75 minutes has been spent by [facility] providers in the care of this patient while under observation status      I have worn appropriate PPE during this patient encounter, sanitized my hands both with entering and exiting patient's room.    I have discussed plan of care with patient including advance care plan and/or surrogate decision maker.  Patient advises that their daughter will be their primary surrogate decision maker

## 2024-04-24 NOTE — PROGRESS NOTES
ED OBSERVATION PROGRESS/DISCHARGE SUMMARY    Date of Admission: 4/23/2024   LOS: 0 days   PCP: Mega Esparza MD    Subjective patient reports she is feeling okay this morning, endorses pain in legs but denies chest discomfort.  States that she recently lost a daughter which has weighed heavily on her.    Hospital Outcome: 89-year-old female admitted to the observation unit for further evaluation of bilateral lower extremity edema.  Patient has history of dependent edema, previously was on as needed Lasix.  She began taking Lasix 20 mg daily at the end of March.  She saw her PCP on 4/22 and he told her to start taking 40 mg daily for a few days.  Her legs then began started weeping at home so she came into the ED.  Patient was given Lasix 80 mg in ED yesterday and she diuresed 2.7 L overnight.      4/24: Patient was seen by cardiology this morning, Dr. Galvin.  He feels patient is in heart failure.  Echocardiogram pending but Dr. Galvin has requested patient be admission to hospital for further diuresis.  I discussed patient with Dr. Leonard who has graciously accepted the patient for admission to the hospital under care of A.  Discussed with patient and daughters at the bedside who expressed understanding and are in agreement with plan.    ROS:  General: no fevers, chills  Respiratory: no cough, dyspnea  Cardiovascular: no chest pain, palpitations  Abdomen: No abdominal pain, nausea, vomiting, or diarrhea  Neurologic: No focal weakness    Objective   Physical Exam:  I have reviewed the vital signs.  Temp:  [97.1 °F (36.2 °C)-98.4 °F (36.9 °C)] 97.5 °F (36.4 °C)  Heart Rate:  [59-68] 61  Resp:  [16-18] 16  BP: (104-141)/(57-82) 104/60  General Appearance:    Alert, cooperative, no distress  Head:    Normocephalic, atraumatic  Eyes:    Sclerae anicteric  Neck:   Supple, no mass  Lungs: Clear to auscultation bilaterally, respirations unlabored  Heart: Regular rate and rhythm, S1 and S2 normal, no murmur, rub  or gallop  Abdomen:  Soft, nontender, bowel sounds active, nondistended  Extremities: No clubbing, cyanosis, or edema to lower extremities  Pulses:  2+ and symmetric in distal lower extremities  Skin: No rashes   Neurologic: Oriented x3, Normal strength to extremities    Results Review:    I have reviewed the labs, radiology results and diagnostic studies.    Results from last 7 days   Lab Units 04/24/24  0521   WBC 10*3/mm3 3.05*   HEMOGLOBIN g/dL 12.8   HEMATOCRIT % 38.6   PLATELETS 10*3/mm3 113*     Results from last 7 days   Lab Units 04/24/24  0521 04/23/24  1845 04/22/24  1011   SODIUM mmol/L 142 142 143   POTASSIUM mmol/L 4.1 4.7 4.9   CHLORIDE mmol/L 106 107 104   CO2 mmol/L 28.9 25.9 25   BUN mg/dL 16 15 16   CREATININE mg/dL 0.81 0.85 0.95   CALCIUM mg/dL 10.2 10.2 10.7*   BILIRUBIN mg/dL  --  1.1 1.2   ALK PHOS U/L  --  60 65   ALT (SGPT) U/L  --  9 12   AST (SGOT) U/L  --  13 19   GLUCOSE mg/dL 89 107* 102*     Imaging Results (Last 24 Hours)       ** No results found for the last 24 hours. **            I have reviewed the medications.  ---------------------------------------------------------------------------------------------  Assessment & Plan   Assessment/Problem List    Bilateral leg edema      Plan:    BLE edema  -Lasix 80 mg IV given in ED  -I&O  -Daily weight  -Monitor vital signs  -Cardiology consultation, Dr. Galvin  -Continue IV diuresis per cardiology  -Echocardiogram pending  -Admit to hospital, Dr. Leonard/MISAEL     Long-standing-persistent A-Fib  -Rate controlled  -Pacemaker  -Cardiology office note: She is felt to be poor candidate for anticoagulation due to history of GI bleed and decreased mobility.     History of vaginitis  -Continue daily valacyclovir    Disposition: Admit to hospital, Dr. Leonard/MISAEL    52 minutes has been spent by [facility] providers in the care of this patient while under observation status     This note will serve as progress  note/transfer summary    BELLO Gordillo 04/24/24 10:18 EDT    I have worn appropriate PPE during this patient encounter and sanitized my hands with entering and exiting patient room.

## 2024-04-24 NOTE — PLAN OF CARE
Goal Outcome Evaluation:  Plan of Care Reviewed With: patient           Outcome Evaluation: Pt. is an 89 year old Female admitted to the hospital with BLE edema.  Pt. reports that prior to admission she was using a Rwx for ambulation.  Pt. currently presents with decreased strength, decreased balance, and decreased tolerance to functional activity. This AM, pt. able to ambulate 20 feet, CGA x 1, with use of Rwx.  Pt. requires CGA x 1 for bed mobility and CGA x 1 for sit <-> stand transfers. Verbal/tactile cues given during ambulation for posture correction.  Limited in gait distance due to fatigue and weakness.  Pt. will benefit from skilled inpt. P.T. to address her functional deficits and to assist pt. in regaining her maximum level of independence with functional mobility.      Anticipated Discharge Disposition (PT): home with assist, home with home health

## 2024-04-24 NOTE — CONSULTS
Patient Name:  Brittany Villeda  YOB: 1935  Patient Care Team:  Mega Esparza MD as PCP - General (Family Medicine)  Micaela English MD as Consulting Physician (Gastroenterology)  Mary Grace Culp MD as Consulting Physician (Cardiology)  Jp Krause Jr., MD as Consulting Physician (Hematology and Oncology)  Yasmeen Pichardo [facility] as Pharmacist  Micaela English MD as Referring Physician (Gastroenterology)  Devon Valadez MD as Consulting Physician (Hematology and Oncology)  Rusty Interiano, PharmD as Pharmacist (Pharmacy)  Catie Jules PA as Physician Assistant (Gastroenterology)  Antonio Foster MD as Surgeon (General Surgery)  Valeria Darling APRN as Nurse Practitioner (Obstetrics and Gynecology)    Date of Admission:  4/23/2024  Date of Consult:  4/24/2024    Inpatient Hospitalist Consult  Consult performed by: Kevin Leonard MD  Consult ordered by: Cherelle Garcia PA  Reason for consult: Ongoing management of congestive heart failure        Subjective     Ms. Villeda is a 89 y.o. female that has been admitted to [facility] due to lower extremity edema found secondary to congestive heart failure.  She has been admitted by Kevin Leonard MD and we were asked to see and admit to the hospital for further evaluation and treatment of congestive heart failure.  She initially was evaluated in the ED and admitted to the observation unit.  She has significant cardiac history including atrial fibrillation with a pacemaker, history of diastolic dysfunction, pulmonary hypertension, AV block and other sustained arrhythmias.  She has been having increased swelling in her lower extremities for which she was given Lasix and had recently increased the dose but legs began weeping because of the severe edema.  She denies any chest pain or shortness of breath.  No fever or chills.  Due to worsening symptoms she was told to come to the ED for evaluation.  Initial history with IV  Lasix and has had modest diuresis with some improvement in the edema.      Past Medical History:   Diagnosis Date    Abdominal aortic aneurysm     Anemia     Arrhythmia     Arthritis     Asthma     Bradycardia     Choledocholithiasis 05/09/2021    Colitis     Diastolic dysfunction     Essential hypertension 05/12/2016    GERD (gastroesophageal reflux disease)     Heart block     Hypertension     Hypertensive heart disease     Hyperthyroidism     REPORTS ENLARGED    Inguinal hernia     Kidney stone     Leukopenia     MGUS (monoclonal gammopathy of unknown significance)     Nephrolithiasis     Pacemaker     Paroxysmal atrial fibrillation     Peptic ulceration     Pressure ulcer     REPORTS ON COCCYX. INSTR TO NOTIFY DR BAIRES'S OFFICE    PSVT (paroxysmal supraventricular tachycardia)     Pulmonary hypertension     Rectal bleed     Sleep apnea     NO DEVICE    Subdural hematoma     Systemic hypertension     Trifascicular block     Ulcerative rectosigmoiditis without complication     Ventricular tachycardia     nonsustained     Past Surgical History:   Procedure Laterality Date    BACK SURGERY      lumbar fusion    DUSTY HOLE Left 08/08/2021    Procedure: Left-sided dusty holes for evacuation of subdural hematoma;  Surgeon: Gustavo Callaway MD;  Location: [facility] MAIN OR;  Service: Neurosurgery;  Laterality: Left;    CARDIAC ELECTROPHYSIOLOGY PROCEDURE N/A 11/07/2018    Procedure: Pacemaker DC new   [place];  Surgeon: Jesus Granda MD;  Location: [facility] CATH INVASIVE LOCATION;  Service: Cardiology    CHOLECYSTECTOMY      COLONOSCOPY  06/16/2014    colitis, cryptitis,  tics, NBIH, TA w/low grade dysplasia    COLONOSCOPY N/A 10/28/2019    Procedure: COLONOSCOPY WITH COLD AND HOT POLYPECTOMIES;  Surgeon: Micaela English MD;  Location: [facility] ENDOSCOPY;  Service: Gastroenterology    ENDOSCOPY N/A 05/13/2021    Procedure: ESOPHAGOGASTRODUODENOSCOPY with BX;  Surgeon: Stefan Mcfadden MD;  Location: [facility]  ENDOSCOPY;  Service: Gastroenterology;  Laterality: N/A;  EPIGASTRIC PAIN  --DUODENAL ULCER, HEMORRHAGIC GASTRITIS, HIATAL HERNIA     ENDOSCOPY N/A 10/11/2022    Procedure: ESOPHAGOGASTRODUODENOSCOPY;  Surgeon: Micaela English MD;  Location:  [facility] ENDOSCOPY;  Service: Gastroenterology;  Laterality: N/A;  PRE- MELENA  POST- ESOPHAGITIS    HEMORRHOIDECTOMY      HERNIA REPAIR      umbilical    HYSTERECTOMY      INGUINAL HERNIA REPAIR Left 2023    Procedure: INGUINAL HERNIA REPAIR LEFT;  Surgeon: Antonio Foster MD;  [place]  [facility] [place] [facility]  Service: General;  Laterality: Left;    JOINT REPLACEMENT Bilateral     KNEES    MYOMECTOMY      PACEMAKER IMPLANTATION      SHOULDER SURGERY      SIGMOIDOSCOPY N/A 2022    Procedure: SIGMOIDOSCOPY FLEXIBLE WITH COLD BIOPSIES AND ASPIRATE;  Surgeon: Micaela English MD;  Location:  [facility] ENDOSCOPY;  Service: Gastroenterology;  Laterality: N/A;  PRE- RECTAL BLEEDING  POST- HEMORRHOIDS, DIVERTICULOSIS, COLITIS    SINUS SURGERY      TOE NAIL AMPUTATION  2019    TONSILLECTOMY       Family History   Problem Relation Age of Onset    Diabetes Mother     Breast cancer Sister     Kidney cancer Sister     Heart disease Sister     Prostate cancer Brother     Prostate cancer Brother     Prostate cancer Brother     Malig Hyperthermia Neg Hx      Social History     Tobacco Use    Smoking status: Former     Current packs/day: 0.00     Average packs/day: 1.5 packs/day for 12.0 years (18.0 ttl pk-yrs)     Types: Cigarettes     Start date:      Quit date:      Years since quittin.3     Passive exposure: Past    Smokeless tobacco: Never    Tobacco comments:     QUIT SMOKING    Vaping Use    Vaping status: Never Used   Substance Use Topics    Alcohol use: No     Comment: caffeine - decaf coffee    Drug use: Never     Medications Prior to Admission   Medication Sig Dispense Refill Last Dose    acetaminophen (TYLENOL) 500 MG tablet Take 1 tablet by mouth Every 6  (Six) Hours As Needed for Mild Pain .   4/23/2024 at 0800    clobetasol (TEMOVATE) 0.05 % ointment Apply 1 Application topically to the appropriate area as directed 2 (Two) Times a Day. 60 g 1 4/23/2024 at 0800    furosemide (LASIX) 40 MG tablet Take 1 tablet by mouth Daily.   4/22/2024 at 1130    lidocaine (XYLOCAINE) 5 % ointment Apply 1 Application topically to the appropriate area as directed Daily.   4/23/2024 at 0800    Magnesium Oxide -Mg Supplement 400 (240 Mg) MG tablet Take 1 tablet by mouth Daily. 90 tablet 1 4/23/2024 at 0800    mesalamine (APRISO) 0.375 g 24 hr capsule Take 4 capsules by mouth Daily. 360 capsule 0 4/23/2024 at 0800    nystatin (MYCOSTATIN) 391810 UNIT/GM ointment Apply 1 Application topically to the appropriate area as directed As Needed.       pantoprazole (PROTONIX) 40 MG EC tablet Take 1 tablet by mouth Daily. 90 tablet 1 4/22/2024 at 0800    potassium chloride (K-DUR,KLOR-CON) 10 MEQ CR tablet Take 1 tablet by mouth Daily. (Patient taking differently: Take 2 tablets by mouth Daily.) 90 tablet 1 4/22/2024 at 0800    tacrolimus (PROTOPIC) 0.1 % ointment Apply 1 Application topically to the appropriate area as directed 2 (Two) Times a Day.   4/23/2024 at 0800    valACYclovir (VALTREX) 1000 MG tablet Take 1 tablet by mouth Daily.   4/22/2024 at 0800    vitamin B-12 (CYANOCOBALAMIN) 1000 MCG tablet Take 1 tablet by mouth Daily. 90 tablet 1 4/22/2024 at 0800    albuterol sulfate  (90 Base) MCG/ACT inhaler Inhale 2 puffs Every 4 (Four) Hours As Needed for Wheezing (or cough). 18 g 0     clonazePAM (KlonoPIN) 0.5 MG tablet Take 1 tablet by mouth 2 (Two) Times a Day As Needed for Anxiety (Grief related anxiety). 10 tablet 0 Unknown    ELDERBERRY PO Take 2 tablets by mouth Daily. HOLDING FOR DOS       furosemide (LASIX) 20 MG tablet Take 1 tablet by mouth Daily As Needed (edema). 30 tablet 1     psyllium (METAMUCIL) 58.6 % packet Take 1 packet by mouth Daily.        Allergies:   Codeine, Amitriptyline, Amoxicillin-pot clavulanate, Aspirin, Carisoprodol-aspirin-codeine, Iodinated contrast media, Latex, Naproxen, Nsaids, Soma compound with codeine [carisoprodol-aspirin-codeine], Sulfa antibiotics, and Tramadol    Review of Systems   Constitutional: Negative.    HENT: Negative.     Respiratory:  Positive for shortness of breath.    Cardiovascular:  Positive for leg swelling.   Gastrointestinal: Negative.    Endocrine: Negative.    Genitourinary: Negative.    Musculoskeletal: Negative.    Skin: Negative.    Neurological: Negative.    Hematological: Negative.    Psychiatric/Behavioral: Negative.         Objective      Vital Signs  Temp:  [97.1 °F (36.2 °C)-98.4 °F (36.9 °C)] 97.7 °F (36.5 °C)  Heart Rate:  [59-68] 59  Resp:  [16-18] 17  BP: (104-141)/(57-82) 127/59  Body mass index is 29.35 kg/m².    Physical Exam  Vitals and nursing note reviewed.   Constitutional:       General: She is not in acute distress.  HENT:      Head: Normocephalic and atraumatic.   Cardiovascular:      Rate and Rhythm: Normal rate and regular rhythm.   Pulmonary:      Effort: Pulmonary effort is normal.      Breath sounds: Normal breath sounds.   Abdominal:      General: Bowel sounds are normal. There is no distension.      Palpations: Abdomen is soft.      Tenderness: There is no abdominal tenderness.   Musculoskeletal:      Right lower leg: Edema present.      Left lower leg: Edema present.   Skin:     General: Skin is warm and dry.   Neurological:      Mental Status: She is alert and oriented to person, place, and time.   Psychiatric:         Behavior: Behavior normal.         Results Review:   I reviewed the patient's new clinical results.  I reviewed the patient's new imaging results and agree with the interpretation.  I reviewed the patient's other test results and agree with the interpretation  I personally viewed and interpreted the patient's EKG/Telemetry data         Active Hospital Problems    Diagnosis   POA    **Acute on chronic diastolic congestive heart failure [I50.33]  Yes    Bilateral lower extremity edema [R60.0]  Yes    Thrombocytopenia [D69.6]  Yes    COPD (chronic obstructive pulmonary disease) [J44.9]  Yes    Paroxysmal atrial fibrillation [I48.0]  Yes    Pulmonary hypertension [I27.20]  Yes    HUGO (obstructive sleep apnea) [G47.33]  Yes    Essential hypertension [I10]  Yes       89 y.o. female with Acute on chronic diastolic congestive heart failure.    Discussed with cardiology who feels this is acute exacerbation of known diastolic heart failure.  Will defer diuresis to them.  Echocardiogram performed this morning shows preserved EF.  BMP and uric acid daily  Cardiology consulted.  Strict I/Os and daily weights.   NC oxygen.   Keep legs elevated regarding severe edema.  Wound care nurse following.  TCP chronic, monitor with daily CBC  Continue home antihypertensive regimen.    Thank you very much for asking [facility] to be involved in this patient's care. We will follow along with you.      Kevin Leonard MD  [facility]  04/24/24  16:48 EDT

## 2024-04-24 NOTE — PLAN OF CARE
Goal Outcome Evaluation:           Progress: no change  Outcome Evaluation: Transfer to this unit today. A&Ox4. RA. Skin issue noted on coccyx, barrier cream applied. IV lasix, good UOP. Purewick in place, catheter care provided. BLE elevated. VSS.

## 2024-04-24 NOTE — THERAPY EVALUATION
Patient Name: rBittany Villeda  : 1935    MRN: [id number]                              Today's Date: 2024       Admit Date: 2024    Visit Dx:     ICD-10-CM ICD-9-CM   1. Bilateral lower extremity edema  R60.0 782.3   2. Thrombocytopenia  D69.6 287.5     Patient Active Problem List   Diagnosis    Non-toxic multinodular goiter    Chronic midline low back pain with right-sided sciatica    Essential hypertension    Gastroesophageal reflux disease without esophagitis    Cataract    Pulmonary hypertension    Diastolic dysfunction    HUGO (obstructive sleep apnea)    Trifascicular block    MGUS (monoclonal gammopathy of unknown significance)    Ulcerative rectosigmoiditis without complication    Lichen sclerosus    Heart block    Sleep-related hypoxia    Bradycardia    History of atrial fibrillation    Pacemaker    Paroxysmal SVT (supraventricular tachycardia)    History of chest pain    Palpitations    Atrial fibrillation    Ascending aortic aneurysm    Mediastinal lymphadenopathy    Anemia    Obesity (BMI 30-39.9)    Dysautonomia    Hypercalcemia    Hyperparathyroidism    Choledocholithiasis    Duodenal ulcer    Hemorrhagic gastritis    Exertional dyspnea    COPD (chronic obstructive pulmonary disease)    Osteoarthritis of glenohumeral joint    ICH (intracerebral hemorrhage)    Lower GI bleed    Thrombocytopenia    Lichen sclerosus of female genitalia    Senile atrophic vaginitis    Left inguinal hernia    Vulvar pain    Bilateral leg edema     Past Medical History:   Diagnosis Date    Abdominal aortic aneurysm     Anemia     Arrhythmia     Arthritis     Asthma     Bradycardia     Choledocholithiasis 2021    Colitis     Diastolic dysfunction     Essential hypertension 2016    GERD (gastroesophageal reflux disease)     Heart block     Hypertension     Hypertensive heart disease     Hyperthyroidism     REPORTS ENLARGED    Inguinal hernia     Kidney stone     Leukopenia     MGUS (monoclonal  gammopathy of unknown significance)     Nephrolithiasis     Pacemaker     Paroxysmal atrial fibrillation     Peptic ulceration     Pressure ulcer     REPORTS ON COCCYX. INSTR TO NOTIFY DR FOSTER'S OFFICE    PSVT (paroxysmal supraventricular tachycardia)     Pulmonary hypertension     Rectal bleed     Sleep apnea     NO DEVICE    Subdural hematoma     Systemic hypertension     Trifascicular block     Ulcerative rectosigmoiditis without complication     Ventricular tachycardia     nonsustained     Past Surgical History:   Procedure Laterality Date    BACK SURGERY      lumbar fusion    DUSTY HOLE Left 08/08/2021    Procedure: Left-sided dusty holes for evacuation of subdural hematoma;  Surgeon: Gustavo Callaway MD;  Location: [facility] MAIN OR;  Service: Neurosurgery;  Laterality: Left;    CARDIAC ELECTROPHYSIOLOGY PROCEDURE N/A 11/07/2018    Procedure: Pacemaker DC new   [place];  Surgeon: Jesus Granda MD;  Location: [facility] CATH INVASIVE LOCATION;  Service: Cardiology    CHOLECYSTECTOMY      COLONOSCOPY  06/16/2014    colitis, cryptitis,  tics, NBIH, TA w/low grade dysplasia    COLONOSCOPY N/A 10/28/2019    Procedure: COLONOSCOPY WITH COLD AND HOT POLYPECTOMIES;  Surgeon: Micaela English MD;  Location: [facility] ENDOSCOPY;  Service: Gastroenterology    ENDOSCOPY N/A 05/13/2021    Procedure: ESOPHAGOGASTRODUODENOSCOPY with BX;  Surgeon: Stefan Mcfadden MD;  Location: [facility] ENDOSCOPY;  Service: Gastroenterology;  Laterality: N/A;  EPIGASTRIC PAIN  --DUODENAL ULCER, HEMORRHAGIC GASTRITIS, HIATAL HERNIA     ENDOSCOPY N/A 10/11/2022    Procedure: ESOPHAGOGASTRODUODENOSCOPY;  Surgeon: Micaela English MD;  Location: [facility] ENDOSCOPY;  Service: Gastroenterology;  Laterality: N/A;  PRE- MELENA  POST- ESOPHAGITIS    HEMORRHOIDECTOMY      HERNIA REPAIR      umbilical    HYSTERECTOMY      INGUINAL HERNIA REPAIR Left 9/1/2023    Procedure: INGUINAL HERNIA REPAIR LEFT;  Surgeon: Antonio Foster MD;  Location:   [facility];  Service: General;  Laterality: Left;    JOINT REPLACEMENT Bilateral     KNEES    MYOMECTOMY      PACEMAKER IMPLANTATION      SHOULDER SURGERY      SIGMOIDOSCOPY N/A 11/02/2022    Procedure: SIGMOIDOSCOPY FLEXIBLE WITH COLD BIOPSIES AND ASPIRATE;  Surgeon: Micaela English MD;  Location: [facility] ENDOSCOPY;  Service: Gastroenterology;  Laterality: N/A;  PRE- RECTAL BLEEDING  POST- HEMORRHOIDS, DIVERTICULOSIS, COLITIS    SINUS SURGERY      TOE NAIL AMPUTATION  03/04/2019    TONSILLECTOMY        General Information       Row Name 04/24/24 1119          Physical Therapy Time and Intention    Document Type evaluation  Pt. admitted with BLE edema  -MS     Mode of Treatment physical therapy;individual therapy  -MS       Row Name 04/24/24 1119          General Information    Patient Profile Reviewed yes  -MS     Prior Level of Function --  use of Rwx for ambulation  -MS     Existing Precautions/Restrictions fall  Exit alarm  -MS     Barriers to Rehab none identified  -MS       Row Name 04/24/24 1119          Cognition    Orientation Status (Cognition) oriented x 3  -MS       Row Name 04/24/24 1119          Safety Issues, Functional Mobility    Comment, Safety Issues/Impairments (Mobility) Gait belt used for safety.  -MS               User Key  (r) = Recorded By, (t) = Taken By, (c) = Cosigned By      Initials Name Provider Type    MS Ash Villalta, PT Physical Therapist                   Mobility       Row Name 04/24/24 1120          Bed Mobility    Bed Mobility supine-sit;sit-supine  -MS     Supine-Sit [place] (Bed Mobility) contact guard  -MS     Sit-Supine [place] (Bed Mobility) contact guard  -MS       Row Name 04/24/24 1120          Sit-Stand Transfer    Sit-Stand [place] (Transfers) contact guard  -MS     Assistive Device (Sit-Stand Transfers) walker, front-wheeled  -MS       Row Name 04/24/24 1120          Gait/Stairs (Locomotion)    [place] Level (Gait) contact guard  -MS      Assistive Device (Gait) walker, front-wheeled  -MS     Distance in Feet (Gait) 20  -MS     Deviations/Abnormal Patterns (Gait) ovi decreased  -MS     Bilateral Gait Deviations forward flexed posture  -MS     Comment, (Gait/Stairs) Verbal/tactile cues given for posture correction.  Limited in gait distance due to fatigue, weakness  -MS               User Key  (r) = Recorded By, (t) = Taken By, (c) = Cosigned By      Initials Name Provider Type    Ash Dominique KLAUS PT Physical Therapist                   Obj/Interventions       Row Name 04/24/24 1121          Range of Motion Comprehensive    Comment, General Range of Motion BUE/LE (WFL's)  -MS       Row Name 04/24/24 1121          Strength Comprehensive (MMT)    Comment, General Manual Muscle Testing (MMT) Assessment BUE/LE (3+/5)  -MS               User Key  (r) = Recorded By, (t) = Taken By, (c) = Cosigned By      Initials Name Provider Type    Ash Dominique KLAUS PT Physical Therapist                   Goals/Plan       Row Name 04/24/24 1122          Bed Mobility Goal 1 (PT)    Activity/Assistive Device (Bed Mobility Goal 1, PT) bed mobility activities, all  -MS     [place]ambers Level/Cues Needed (Bed Mobility Goal 1, PT) standby assist  -MS     Time Frame (Bed Mobility Goal 1, PT) long term goal (LTG);1 week  -MS       Row Name 04/24/24 1122          Transfer Goal 1 (PT)    Activity/Assistive Device (Transfer Goal 1, PT) transfers, all;walker, rolling  -MS     [place]ambers Level/Cues Needed (Transfer Goal 1, PT) standby assist  -MS     Time Frame (Transfer Goal 1, PT) long term goal (LTG);1 week  -MS       Row Name 04/24/24 1122          Gait Training Goal 1 (PT)    Activity/Assistive Device (Gait Training Goal 1, PT) gait (walking locomotion);walker, rolling  -MS     [place]ambers Level (Gait Training Goal 1, PT) standby assist  -MS     Distance (Gait Training Goal 1, PT) 100 feet  -MS     Time Frame (Gait Training Goal 1, PT) long term goal (LTG);1 week   -MS       Row Name 04/24/24 1122          Therapy Assessment/Plan (PT)    Planned Therapy Interventions (PT) balance training;bed mobility training;gait training;home exercise program;patient/family education;postural re-education;transfer training;strengthening  -MS               User Key  (r) = Recorded By, (t) = Taken By, (c) = Cosigned By      Initials Name Provider Type    Ash Dominique, PT Physical Therapist                   Clinical Impression       Row Name 04/24/24 1121          Pain    Pretreatment Pain Rating 3/10  -MS     Posttreatment Pain Rating 3/10  -MS     Pain Location - Side/Orientation Right  -MS     Pre/Posttreatment Pain Comment Right lower L.E. (between knee and ankle)  -MS       Row Name 04/24/24 1121          Plan of Care Review    Plan of Care Reviewed With patient;family  -MS       Row Name 04/24/24 1121          Therapy Assessment/Plan (PT)    Rehab Potential (PT) good, to achieve stated therapy goals  -MS     Criteria for Skilled Interventions Met (PT) skilled treatment is necessary  -MS     Therapy Frequency (PT) 6 times/wk  -MS       Row Name 04/24/24 1121          Positioning and Restraints    Pre-Treatment Position in bed  -MS     Post Treatment Position bed  -MS     In Bed notified nsg;supine;call light within reach;encouraged to call for assist;exit alarm on;with family/caregiver  -MS               User Key  (r) = Recorded By, (t) = Taken By, (c) = Cosigned By      Initials Name Provider Type    Ash Dominique, PT Physical Therapist                   Outcome Measures       Row Name 04/24/24 1123 04/24/24 0005       How much help from another person do you currently need...    Turning from your back to your side while in flat bed without using bedrails? 3  -MS 4  -MD    Moving from lying on back to sitting on the side of a flat bed without bedrails? 3  -MS 4  -MD    Moving to and from a bed to a chair (including a wheelchair)? 3  -MS 4  -MD    Standing up from a chair  using your arms (e.g., wheelchair, bedside chair)? 3  -MS 4  -MD    Climbing 3-5 steps with a railing? 3  -MS 3  -MD    To walk in hospital room? 3  -MS 3  -MD    AM-PAC 6 Clicks Score (PT) 18  -MS 22  -MD    Highest Level of Mobility Goal 6 --> Walk 10 steps or more  -MS 7 --> Walk 25 feet or more  -MD      Row Name 04/24/24 1123          Functional Assessment    Outcome Measure Options AM-PAC 6 Clicks Basic Mobility (PT)  -MS               User Key  (r) = Recorded By, (t) = Taken By, (c) = Cosigned By      Initials Name Provider Type    Janice Scanlon, RN Registered Nurse    Ash Dominique, PT Physical Therapist                                 Physical Therapy Education       Title: PT OT SLP Therapies (In Progress)       Topic: Physical Therapy (In Progress)       Point: Mobility training (Done)       Learning Progress Summary             Patient Acceptance, E,D, VU,NR by MS at 4/24/2024 1123                         Point: Home exercise program (Not Started)       Learner Progress:  Not documented in this visit.              Point: Body mechanics (Done)       Learning Progress Summary             Patient Acceptance, E,D, VU,NR by MS at 4/24/2024 1123                         Point: Precautions (Done)       Learning Progress Summary             Patient Acceptance, E,D, VU,NR by MS at 4/24/2024 1123                                         User Key       Initials Effective Dates Name Provider Type Discipline    MS 06/16/21 -  Ash Villalta, PT Physical Therapist PT                  PT Recommendation and Plan  Planned Therapy Interventions (PT): balance training, bed mobility training, gait training, home exercise program, patient/family education, postural re-education, transfer training, strengthening  Plan of Care Reviewed With: patient  Outcome Evaluation: Pt. is an 89 year old Female admitted to the hospital with BLE edema.  Pt. reports that prior to admission she was using a Rwx for ambulation.   Pt. currently presents with decreased strength, decreased balance, and decreased tolerance to functional activity. This AM, pt. able to ambulate 20 feet, CGA x 1, with use of Rwx.  Pt. requires CGA x 1 for bed mobility and CGA x 1 for sit <-> stand transfers. Verbal/tactile cues given during ambulation for posture correction.  Limited in gait distance due to fatigue and weakness.  Pt. will benefit from skilled inpt. P.T. to address her functional deficits and to assist pt. in regaining her maximum level of independence with functional mobility.     Time Calculation:         PT Charges       Row Name 04/24/24 1127             Time Calculation    Start Time 1010  -MS      Stop Time 1025  -MS      Time Calculation (min) 15 min  -MS      PT Received On 04/24/24  -MS      PT - Next Appointment 04/25/24  -MS      PT Goal Re-Cert Due Date 05/01/24  -MS         Time Calculation- PT    Total Timed Code Minutes- PT 14 minute(s)  -MS                User Key  (r) = Recorded By, (t) = Taken By, (c) = Cosigned By      Initials Name Provider Type    Ash Dominique, PT Physical Therapist                  Therapy Charges for Today       Code Description Service Date Service Provider Modifiers Qty    53133270318  PT EVAL MOD COMPLEXITY 2 4/24/2024 Ash Villalta, PT GP 1    [id number]  PT THERAPEUTIC ACT EA 15 MIN 4/24/2024 Ash Villalta, PT GP 1            PT G-Codes  Outcome Measure Options: AM-PAC 6 Clicks Basic Mobility (PT)  AM-PAC 6 Clicks Score (PT): 18  PT Discharge Summary  Anticipated Discharge Disposition (PT): home with assist, home with home health (Pending pt. progress)    Ash Villalta, MARIA D  4/24/2024

## 2024-04-24 NOTE — PROGRESS NOTES
MD ATTESTATION NOTE     SHARED VISIT: This visit was performed by BOTH a physician and an APC. The substantive portion of the medical decision making was performed by this attesting physician who made or approved the management plan and takes responsibility for patient management. All studies in the APC note (if performed) were independently interpreted by me.  The BR[facility] and I have discussed this patient's history, physical exam, and treatment plan. I have reviewed the documentation and personally had a face to face interaction with the patient. I affirm the documentation and agree with the treatment and plan. I provided a substantive portion of the care of the patient.  I personally performed the physical exam in its entirety, and below are my findings.      Brief HPI: Patient is resting comfortably.  Denies chest pain or shortness of breath.    PHYSICAL EXAM  ED Triage Vitals   Temp Heart Rate Resp BP SpO2   04/23/24 1743 04/23/24 1743 04/23/24 1743 04/23/24 1755 04/23/24 1743   97.1 °F (36.2 °C) 68 18 124/65 98 %      Temp src Heart Rate Source Patient Position BP Location FiO2 (%)   04/23/24 1743 04/23/24 2051 04/23/24 2051 04/23/24 2051 --   Tympanic Monitor Lying Left arm          GENERAL: Awake and alert.  Well-developed elderly female.  Resting comfortably in no acute distress  HENT: nares patent  EYES: no scleral icterus  CV: regular rhythm, normal rate  RESPIRATORY: normal effort, clear to auscultation bilaterally  ABDOMEN: soft, nontender  MUSCULOSKELETAL: There is 3+ pedal edema in both lower legs  NEURO: alert, moves all extremities, follows commands  PSYCH:  calm, cooperative  SKIN: warm, dry    Vital signs and nursing notes reviewed.        Plan: She has had 2.7 L of urine output since getting IV Lasix.  Renal function is normal.  Cardiology consult is pending.

## 2024-04-24 NOTE — CONSULTS
Date of Consultation: 24    Referral Provider: Rusty Arnold MD     Reason for Consultation: CHF exacerbation, edema    Encounter Provider: Franklin Galvin MD    Group of Service: [facility]     Patient Name: Brittany Villeda    :1935    Chief complaint: CHF exacerbation, edema    History of Present Illness:      This is a very pleasant 89-year-old female with a history of chronic diastolic CHF, persistent atrial fibrillation, pacemaker, nonsustained ventricular tachycardia, and chronic lower extremity edema.  She also has a history of chronic right-sided congestive heart failure.  She is not on systemic anticoagulation for the atrial fibrillation secondary to history of GI bleeding and thrombocytopenia.    The patient has a history of lower extremity edema, although it evidently has worsened within the last several weeks.  She actually recently hit her right lower extremity, and it began to weep with fluid.  She has a small wound which is covered on exam.  She denied any recent shortness of breath or chest discomfort.  She has been given 80 mg of IV Lasix, and has diuresed well, but still has significant lower extremity edema.      ECHO 8/10/23    Left ventricular systolic function is normal. Calculated left ventricular EF = 59.7% Normal left ventricular cavity size and wall thickness noted. All left ventricular wall segments contract normally. Left ventricular diastolic function was indeterminate.    Left atrial volume is severely increased. The right atrial cavity is moderately dilated.    The mitral valve is structurally normal with no significant stenosis present. Mild mitral valve regurgitation is present.    Moderate tricuspid valve regurgitation is present. Estimated right ventricular systolic pressure from tricuspid regurgitation is mildly elevated (35-45 mmHg). Calculated right ventricular systolic pressure from tricuspid regurgitation is 38.7 mmHg.    Stress Test  5/29/19  Myocardial perfusion imaging indicates a normal myocardial perfusion study with no evidence of ischemia.  Left ventricular ejection fraction is normal (Calculated EF = 69%).  Impressions are consistent with a low risk study.    Holter 9/17/18  Patient diary submitted.Palpitations was reported during the monitoring period. Palpitations correlated with episodes of premature atrial contractions. Patient reported rare episodes of palpitations. No complications noted. The predominant rhythm noted during the testing period was sinus rhythm. Premature atrial contractions occured frequently. PACs occurred 22.2% of the monitor time. Evidence of non-specified atrial arrhythmias was noted. There are 14 atrial tachycardia events, longest lasting 4 beats.  These are most consistent with brief episodes of supraventricular tachycardia. Premature ventricular contractions occured occasionally. There were no episodes of ventricular tachycardia. Pauses were noted in the sinoatrial node. There were 65 pauses. 3 seconds was the longest pause. 1st degree atrioventricular block noted. 65 pauses were noted longest lasting 2.9 seconds at 6:31 AM.       Past Medical History:   Diagnosis Date    Abdominal aortic aneurysm     Anemia     Arrhythmia     Arthritis     Asthma     Bradycardia     Choledocholithiasis 05/09/2021    Colitis     Diastolic dysfunction     Essential hypertension 05/12/2016    GERD (gastroesophageal reflux disease)     Heart block     Hypertension     Hypertensive heart disease     Hyperthyroidism     REPORTS ENLARGED    Inguinal hernia     Kidney stone     Leukopenia     MGUS (monoclonal gammopathy of unknown significance)     Nephrolithiasis     Pacemaker     Paroxysmal atrial fibrillation     Peptic ulceration     Pressure ulcer     REPORTS ON COCCYX. INSTR TO NOTIFY DR BAIRES'S OFFICE    PSVT (paroxysmal supraventricular tachycardia)     Pulmonary hypertension     Rectal bleed     Sleep apnea     NO DEVICE     Subdural hematoma     Systemic hypertension     Trifascicular block     Ulcerative rectosigmoiditis without complication     Ventricular tachycardia     nonsustained         Past Surgical History:   Procedure Laterality Date    BACK SURGERY      lumbar fusion    DUSTY HOLE Left 08/08/2021    Procedure: Left-sided dusty holes for evacuation of subdural hematoma;  Surgeon: Gustavo Callaway MD;  Location: [facility] MAIN OR;  Service: Neurosurgery;  Laterality: Left;    CARDIAC ELECTROPHYSIOLOGY PROCEDURE N/A 11/07/2018    Procedure: Pacemaker DC new   [place];  Surgeon: Jesus Granda MD;  Location: [facility] CATH INVASIVE LOCATION;  Service: Cardiology    CHOLECYSTECTOMY      COLONOSCOPY  06/16/2014    colitis, cryptitis,  tics, NBIH, TA w/low grade dysplasia    COLONOSCOPY N/A 10/28/2019    Procedure: COLONOSCOPY WITH COLD AND HOT POLYPECTOMIES;  Surgeon: Micaela English MD;  Location: [facility] ENDOSCOPY;  Service: Gastroenterology    ENDOSCOPY N/A 05/13/2021    Procedure: ESOPHAGOGASTRODUODENOSCOPY with BX;  Surgeon: Stefan Mcfadden MD;  Location: [facility] ENDOSCOPY;  Service: Gastroenterology;  Laterality: N/A;  EPIGASTRIC PAIN  --DUODENAL ULCER, HEMORRHAGIC GASTRITIS, HIATAL HERNIA     ENDOSCOPY N/A 10/11/2022    Procedure: ESOPHAGOGASTRODUODENOSCOPY;  Surgeon: Micaela English MD;  Location: [facility] ENDOSCOPY;  Service: Gastroenterology;  Laterality: N/A;  PRE- MELENA  POST- ESOPHAGITIS    HEMORRHOIDECTOMY      HERNIA REPAIR      umbilical    HYSTERECTOMY      INGUINAL HERNIA REPAIR Left 9/1/2023    Procedure: INGUINAL HERNIA REPAIR LEFT;  Surgeon: Antonio Foster MD;  Location: [facility] OR OSC;  Service: General;  Laterality: Left;    JOINT REPLACEMENT Bilateral     KNEES    MYOMECTOMY      PACEMAKER IMPLANTATION      SHOULDER SURGERY      SIGMOIDOSCOPY N/A 11/02/2022    Procedure: SIGMOIDOSCOPY FLEXIBLE WITH COLD BIOPSIES AND ASPIRATE;  Surgeon: Micaela English MD;  Location: [facility] ENDOSCOPY;   Service: Gastroenterology;  Laterality: N/A;  PRE- RECTAL BLEEDING  POST- HEMORRHOIDS, DIVERTICULOSIS, COLITIS    SINUS SURGERY      TOE NAIL AMPUTATION  03/04/2019    TONSILLECTOMY           Allergies   Allergen Reactions    Codeine Hallucinations     Tolerates hydromorphone    Amitriptyline Rash    Amoxicillin-Pot Clavulanate Rash    Aspirin Unknown - Low Severity     Patient doesn't know why    Carisoprodol-Aspirin-Codeine Palpitations    Iodinated Contrast Media Rash    Latex Rash    Naproxen Rash    Nsaids Unknown - Low Severity     UNSURE OF REACTION    Soma Compound With Codeine [Carisoprodol-Aspirin-Codeine] Rash    Sulfa Antibiotics Rash    Tramadol Palpitations     heart races          No current facility-administered medications on file prior to encounter.     Current Outpatient Medications on File Prior to Encounter   Medication Sig Dispense Refill    acetaminophen (TYLENOL) 500 MG tablet Take 1 tablet by mouth Every 6 (Six) Hours As Needed for Mild Pain .      clobetasol (TEMOVATE) 0.05 % ointment Apply 1 Application topically to the appropriate area as directed 2 (Two) Times a Day. 60 g 1    furosemide (LASIX) 40 MG tablet Take 1 tablet by mouth Daily.      lidocaine (XYLOCAINE) 5 % ointment Apply 1 Application topically to the appropriate area as directed Daily.      Magnesium Oxide -Mg Supplement 400 (240 Mg) MG tablet Take 1 tablet by mouth Daily. 90 tablet 1    mesalamine (APRISO) 0.375 g 24 hr capsule Take 4 capsules by mouth Daily. 360 capsule 0    nystatin (MYCOSTATIN) 687425 UNIT/GM ointment Apply 1 Application topically to the appropriate area as directed As Needed.      pantoprazole (PROTONIX) 40 MG EC tablet Take 1 tablet by mouth Daily. 90 tablet 1    potassium chloride (K-DUR,KLOR-CON) 10 MEQ CR tablet Take 1 tablet by mouth Daily. (Patient taking differently: Take 2 tablets by mouth Daily.) 90 tablet 1    tacrolimus (PROTOPIC) 0.1 % ointment Apply 1 Application topically to the  appropriate area as directed 2 (Two) Times a Day.      valACYclovir (VALTREX) 1000 MG tablet Take 1 tablet by mouth Daily.      vitamin B-12 (CYANOCOBALAMIN) 1000 MCG tablet Take 1 tablet by mouth Daily. 90 tablet 1    albuterol sulfate  (90 Base) MCG/ACT inhaler Inhale 2 puffs Every 4 (Four) Hours As Needed for Wheezing (or cough). 18 g 0    clonazePAM (KlonoPIN) 0.5 MG tablet Take 1 tablet by mouth 2 (Two) Times a Day As Needed for Anxiety (Grief related anxiety). 10 tablet 0    ELDERBERRY PO Take 2 tablets by mouth Daily. HOLDING FOR DOS      furosemide (LASIX) 20 MG tablet Take 1 tablet by mouth Daily As Needed (edema). 30 tablet 1    psyllium (METAMUCIL) 58.6 % packet Take 1 packet by mouth Daily.           Social History     Socioeconomic History    Marital status:     Number of children: 10    Years of education: High School   Tobacco Use    Smoking status: Former     Current packs/day: 0.00     Average packs/day: 1.5 packs/day for 12.0 years (18.0 ttl pk-yrs)     Types: Cigarettes     Start date:      Quit date:      Years since quittin.3     Passive exposure: Past    Smokeless tobacco: Never    Tobacco comments:     QUIT SMOKING    Vaping Use    Vaping status: Never Used   Substance and Sexual Activity    Alcohol use: No     Comment: caffeine - decaf coffee    Drug use: Never    Sexual activity: Defer         Family History   Problem Relation Age of Onset    Diabetes Mother     Breast cancer Sister     Kidney cancer Sister     Heart disease Sister     Prostate cancer Brother     Prostate cancer Brother     Prostate cancer Brother     Malig Hyperthermia Neg Hx        REVIEW OF SYSTEMS:   Pertinent positives are noted in the HPI above.  Otherwise, all other systems were reviewed, and are negative.     Objective:     Vitals:    24 0513 24 0717 24 1310 24 1358   BP: 108/58 104/60 125/76 127/59   BP Location: Left arm Right arm  Left arm   Patient Position:  "Lying Lying  Lying   Pulse: 59 61 59 59   Resp: 18 16  17   Temp: 97.5 °F (36.4 °C) 97.5 °F (36.4 °C)  97.7 °F (36.5 °C)   TempSrc: Oral Oral  Oral   SpO2: 99% 97%  99%   Weight: 77.6 kg (171 lb)  77.6 kg (171 lb)    Height:   162.6 cm (64\")      Body mass index is 29.35 kg/m².  Flowsheet Rows      Flowsheet Row First Filed Value   Admission Height 162.6 cm (64\") Documented at 04/23/2024 2051   Admission Weight 80 kg (176 lb 6.4 oz) Documented at 04/23/2024 2051             General:    No acute distress, alert and oriented x4, pleasant                   Head:    Normocephalic, atraumatic.   Eyes:          Conjunctivae and sclerae normal, no icterus.   Throat:   No oral lesions, no thrush, oral mucosa moist.    Neck:   Supple, trachea midline.   Lungs:     Decreased breath sounds at the bases    Heart:    Regular rhythm and normal rate (paced).  II/VI SM LLSB.   Abdomen:     Soft, non-tender, non-distended, positive bowel sounds.    Extremities:   2-3+ lower extremity edema.  Small wound on right calf area.   Pulses:   Pulses palpable and equal bilaterally.    Skin:   No bleeding or rash.   Neuro:   Non-focal.  Moves all extremities well.    Psychiatric:   Normal mood and affect.           Lab Review:                Results from last 7 days   Lab Units 04/24/24  0521   SODIUM mmol/L 142   POTASSIUM mmol/L 4.1   CHLORIDE mmol/L 106   CO2 mmol/L 28.9   BUN mg/dL 16   CREATININE mg/dL 0.81   GLUCOSE mg/dL 89   CALCIUM mg/dL 10.2         Results from last 7 days   Lab Units 04/24/24  0521   WBC 10*3/mm3 3.05*   HEMOGLOBIN g/dL 12.8   HEMATOCRIT % 38.6   PLATELETS 10*3/mm3 113*                       EKG (reviewed by me personally):                  Assessment:   1.  Multifactorial lower extremity edema  2.  Acute on chronic diastolic CHF  3.  Acute on chronic right-sided CHF  4.  Pulmonary hypertension  5.  COPD without acute exacerbation  6.  Persistent atrial fibrillation  7.  Status post [employer] dual-chamber " pacemaker in 2018  8.  Obstructive sleep apnea  9.  Hypertension  10.  History of GI bleeding  11.  Thrombocytopenia  12.  Hypertension  13.  Immobility    Plan:       I suspect that her lower extremity edema is a combination of acute on chronic diastolic and right-sided CHF, also with some degree of venous insufficiency.  She had 2-3+ pitting edema, and a small weeping wound on the right lower extremity.  I really feel like she needs further diuresis.  I gave her another 80 mg of IV Lasix this morning, and I followed that with 40 mg of IV Lasix this evening.  She typically only takes the Lasix as needed several times a week.  The trigger for the congestive heart failure is not entirely clear.  Will add a TSH to her labs tomorrow morning to ensure this is not contributing.  Her blood pressure is actually on the lower side, and hypertension is not a contributing factor here.     Again, she is not anticoagulated because of a history of GI bleeding and thrombocytopenia.  She is currently paced, and the pacemaker appears to be functioning well.  Agree with keeping her legs elevated.  Wound care nurse is also following.    Discussed with Dr. Leonard earlier today.  Thank you very much for this consult.    Maxime Galvin MD

## 2024-04-24 NOTE — ED NOTES
Nursing report ED to floor  Brittany Villeda  89 y.o.  female    HPI :  HPI (Adult)  Stated Reason for Visit: bilat leg swelling and weeping    Chief Complaint  Chief Complaint   Patient presents with    Leg Swelling       Admitting doctor:   Karen Roberts MD    Admitting diagnosis:   The primary encounter diagnosis was Bilateral lower extremity edema. A diagnosis of Thrombocytopenia was also pertinent to this visit.    Code status:   Current Code Status       Date Active Code Status Order ID Comments User Context       Prior            Allergies:   Codeine, Amitriptyline, Amoxicillin-pot clavulanate, Aspirin, Carisoprodol-aspirin-codeine, Iodinated contrast media, Latex, Naproxen, Nsaids, Soma compound with codeine [carisoprodol-aspirin-codeine], Sulfa antibiotics, and Tramadol    Isolation:   No active isolations    Intake and Output  No intake or output data in the 24 hours ending 04/23/24 2001    Weight:   There were no vitals filed for this visit.    Most recent vitals:   Vitals:    04/23/24 1831 04/23/24 1854 04/23/24 1901 04/23/24 1931   BP: 137/68  125/76 141/82   Pulse:  61 60 61   Resp:       Temp:       TempSrc:       SpO2:  96% 96% 97%       Active LDAs/IV Access:   Lines, Drains & Airways       Active LDAs       Name Placement date Placement time Site Days    Peripheral IV 04/23/24 1845 Right Antecubital 04/23/24 1845  Antecubital  less than 1    External Urinary Catheter 04/23/24  1943  --  less than 1                    Labs (abnormal labs have a star):   Labs Reviewed   COMPREHENSIVE METABOLIC PANEL - Abnormal; Notable for the following components:       Result Value    Glucose 107 (*)     Albumin 3.4 (*)     All other components within normal limits    Narrative:     GFR Normal >60  Chronic Kidney Disease <60  Kidney Failure <15    The GFR formula is only valid for adults with stable renal function between ages 18 and 70.   CBC WITH AUTO DIFFERENTIAL - Abnormal; Notable for the following  components:    WBC 3.13 (*)     RBC 3.60 (*)     .7 (*)     MCH 35.0 (*)     Platelets 108 (*)     All other components within normal limits   MANUAL DIFFERENTIAL - Abnormal; Notable for the following components:    Neutrophil % 39.8 (*)     Lymphocyte % 47.3 (*)     Neutrophils Absolute 1.25 (*)     All other components within normal limits   BNP (IN-HOUSE) - Normal    Narrative:     This assay is used as an aid in the diagnosis of individuals suspected of having heart failure. It can be used as an aid in the diagnosis of acute decompensated heart failure (ADHF) in patients presenting with signs and symptoms of ADHF to the emergency department (ED). In addition, NT-proBNP of <300 pg/mL indicates ADHF is not likely.    Age Range Result Interpretation  NT-proBNP Concentration (pg/mL:      <50             Positive            >450                   Gray                 300-450                    Negative             <300    50-75           Positive            >900                  Gray                300-900                  Negative            <300      >75             Positive            >1800                  Gray                300-1800                  Negative            <300   CBC AND DIFFERENTIAL    Narrative:     The following orders were created for panel order CBC & Differential.  Procedure                               Abnormality         Status                     ---------                               -----------         ------                     CBC Auto Differential[[id number]]        Abnormal            Final result                 Please view results for these tests on the individual orders.       EKG:   No orders to display       Meds given in ED:   Medications   sodium chloride 0.9 % flush 10 mL (has no administration in time range)   furosemide (LASIX) injection 80 mg (80 mg Intravenous Given 4/23/24 1941)       Imaging results:  No radiology results for the last day    Ambulatory status:    - assist x 2    Social issues:   Social History     Socioeconomic History    Marital status:     Number of children: 10    Years of education: High School   Tobacco Use    Smoking status: Former     Current packs/day: 0.00     Average packs/day: 1.5 packs/day for 12.0 years (18.0 ttl pk-yrs)     Types: Cigarettes     Start date:      Quit date:      Years since quittin.3     Passive exposure: Past    Smokeless tobacco: Never    Tobacco comments:     QUIT SMOKING    Vaping Use    Vaping status: Never Used   Substance and Sexual Activity    Alcohol use: No     Comment: caffeine - decaf coffee    Drug use: Never    Sexual activity: Defer       Peripheral Neurovascular  Peripheral Neurovascular (Adult)  Peripheral Neurovascular WDL: .WDL except  Additional Documentation: Edema (Group)  Edema  Edema: leg, left, leg, right, knee, left, knee, right, ankle, left, ankle, right, foot, left, foot, right  Leg, Left Edema: 3+ (Moderate)  Leg, Right Edema: 3+ (Moderate)  Knee, Left Edema: 3+ (Moderate)  Knee, Right Edema: 3+ (Moderate)  Ankle, Left Edema: 3+ (Moderate)  Ankle, Right Edema: 3+ (Moderate)  Foot, Left Edema: 3+ (Moderate)  Foot, Right Edema: 3+ (Moderate)    Neuro Cognitive  Neuro Cognitive (Adult)  Cognitive/Neuro/Behavioral WDL: WDL, orientation  Orientation: oriented x 4    Learning  Learning Assessment (Adult)  Learning Readiness and Ability: sensory deficit noted (Pt extremely hard of hearing)  Education Provided  Person Taught: patient, family member/friend  Teaching Method: verbal instruction  Teaching Focus: symptom/problem overview, self-management, medication administration, diagnostic test  Education Outcome Evaluation: eager to learn, acceptance expressed, able to teach back, verbalizes understanding    Respiratory  Respiratory (Adult)  Airway WDL: WDL  Respiratory WDL  Respiratory WDL: WDL, all  Rhythm/Pattern, Respiratory: no shortness of breath reported    Abdominal Pain        Pain Assessments  Pain (Adult)  (0-10) Pain Rating: Rest: 7  (0-10) Pain Rating: Activity: 7    NIH Stroke Scale       Laine Sawant RN  04/23/24 20:01 EDT

## 2024-04-25 ENCOUNTER — APPOINTMENT (OUTPATIENT)
Dept: GENERAL RADIOLOGY | Facility: HOSPITAL | Age: 89
End: 2024-04-25
Payer: MEDICARE

## 2024-04-25 ENCOUNTER — HOSPITAL ENCOUNTER (OUTPATIENT)
Facility: HOSPITAL | Age: 89
Setting detail: OBSERVATION
Discharge: HOME | End: 2024-04-27
Attending: EMERGENCY MEDICINE | Admitting: INTERNAL MEDICINE
Payer: MEDICARE

## 2024-04-25 ENCOUNTER — READMISSION MANAGEMENT (OUTPATIENT)
Dept: CALL CENTER | Facility: HOSPITAL | Age: 89
End: 2024-04-25
Payer: MEDICARE

## 2024-04-25 VITALS
HEART RATE: 60 BPM | HEIGHT: 64 IN | BODY MASS INDEX: 29.47 KG/M2 | WEIGHT: 172.62 LBS | SYSTOLIC BLOOD PRESSURE: 107 MMHG | OXYGEN SATURATION: 98 % | RESPIRATION RATE: 16 BRPM | DIASTOLIC BLOOD PRESSURE: 58 MMHG | TEMPERATURE: 97.5 F

## 2024-04-25 DIAGNOSIS — R06.09 DYSPNEA ON EXERTION: ICD-10-CM

## 2024-04-25 DIAGNOSIS — N28.9 ACUTE RENAL INSUFFICIENCY: Primary | ICD-10-CM

## 2024-04-25 DIAGNOSIS — E87.6 HYPOKALEMIA: ICD-10-CM

## 2024-04-25 DIAGNOSIS — I50.32 CHRONIC DIASTOLIC CONGESTIVE HEART FAILURE: ICD-10-CM

## 2024-04-25 DIAGNOSIS — R53.1 GENERALIZED WEAKNESS: ICD-10-CM

## 2024-04-25 DIAGNOSIS — I95.9 HYPOTENSION, UNSPECIFIED HYPOTENSION TYPE: ICD-10-CM

## 2024-04-25 DIAGNOSIS — E83.42 HYPOMAGNESEMIA: ICD-10-CM

## 2024-04-25 LAB
ALBUMIN SERPL-MCNC: 3.5 G/DL (ref 3.5–5.2)
ALBUMIN/GLOB SERPL: 1.1 G/DL
ALP SERPL-CCNC: 60 U/L (ref 39–117)
ALT SERPL W P-5'-P-CCNC: 7 U/L (ref 1–33)
ANION GAP SERPL CALCULATED.3IONS-SCNC: 11.7 MMOL/L (ref 5–15)
ANION GAP SERPL CALCULATED.3IONS-SCNC: 6.6 MMOL/L (ref 5–15)
ANISOCYTOSIS BLD QL: NORMAL
AST SERPL-CCNC: 16 U/L (ref 1–32)
BILIRUB SERPL-MCNC: 1.9 MG/DL (ref 0–1.2)
BUN SERPL-MCNC: 17 MG/DL (ref 8–23)
BUN SERPL-MCNC: 21 MG/DL (ref 8–23)
BUN/CREAT SERPL: 17 (ref 7–25)
BUN/CREAT SERPL: 17.1 (ref 7–25)
CALCIUM SPEC-SCNC: 10.1 MG/DL (ref 8.6–10.5)
CALCIUM SPEC-SCNC: 10.3 MG/DL (ref 8.6–10.5)
CHLORIDE SERPL-SCNC: 102 MMOL/L (ref 98–107)
CHLORIDE SERPL-SCNC: 103 MMOL/L (ref 98–107)
CO2 SERPL-SCNC: 25.3 MMOL/L (ref 22–29)
CO2 SERPL-SCNC: 31.4 MMOL/L (ref 22–29)
CREAT SERPL-MCNC: 1 MG/DL (ref 0.57–1)
CREAT SERPL-MCNC: 1.23 MG/DL (ref 0.57–1)
DEPRECATED RDW RBC AUTO: 51 FL (ref 37–54)
DEPRECATED RDW RBC AUTO: 51.9 FL (ref 37–54)
EGFRCR SERPLBLD CKD-EPI 2021: 42.1 ML/MIN/1.73
EGFRCR SERPLBLD CKD-EPI 2021: 54 ML/MIN/1.73
ERYTHROCYTE [DISTWIDTH] IN BLOOD BY AUTOMATED COUNT: 13.1 % (ref 12.3–15.4)
ERYTHROCYTE [DISTWIDTH] IN BLOOD BY AUTOMATED COUNT: 13.2 % (ref 12.3–15.4)
GLOBULIN UR ELPH-MCNC: 3.1 GM/DL
GLUCOSE SERPL-MCNC: 100 MG/DL (ref 65–99)
GLUCOSE SERPL-MCNC: 147 MG/DL (ref 65–99)
HCT VFR BLD AUTO: 38.9 % (ref 34–46.6)
HCT VFR BLD AUTO: 41.8 % (ref 34–46.6)
HGB BLD-MCNC: 12.8 G/DL (ref 12–15.9)
HGB BLD-MCNC: 14 G/DL (ref 12–15.9)
LYMPHOCYTES # BLD MANUAL: 1.14 10*3/MM3 (ref 0.7–3.1)
LYMPHOCYTES NFR BLD MANUAL: 7.1 % (ref 5–12)
MACROCYTES BLD QL SMEAR: NORMAL
MAGNESIUM SERPL-MCNC: 1.5 MG/DL (ref 1.6–2.4)
MCH RBC QN AUTO: 34.1 PG (ref 26.6–33)
MCH RBC QN AUTO: 35.4 PG (ref 26.6–33)
MCHC RBC AUTO-ENTMCNC: 32.9 G/DL (ref 31.5–35.7)
MCHC RBC AUTO-ENTMCNC: 33.5 G/DL (ref 31.5–35.7)
MCV RBC AUTO: 103.7 FL (ref 79–97)
MCV RBC AUTO: 105.8 FL (ref 79–97)
MONOCYTES # BLD: 0.27 10*3/MM3 (ref 0.1–0.9)
NEUTROPHILS # BLD AUTO: 2.44 10*3/MM3 (ref 1.7–7)
NEUTROPHILS NFR BLD MANUAL: 63.3 % (ref 42.7–76)
NRBC BLD AUTO-RTO: 0 /100 WBC (ref 0–0.2)
NT-PROBNP SERPL-MCNC: 535 PG/ML (ref 0–1800)
PLAT MORPH BLD: NORMAL
PLATELET # BLD AUTO: 115 10*3/MM3 (ref 140–450)
PLATELET # BLD AUTO: 129 10*3/MM3 (ref 140–450)
PMV BLD AUTO: 8.9 FL (ref 6–12)
PMV BLD AUTO: 9.1 FL (ref 6–12)
POTASSIUM SERPL-SCNC: 3.1 MMOL/L (ref 3.5–5.2)
POTASSIUM SERPL-SCNC: 3.7 MMOL/L (ref 3.5–5.2)
PROT SERPL-MCNC: 6.6 G/DL (ref 6–8.5)
RBC # BLD AUTO: 3.75 10*6/MM3 (ref 3.77–5.28)
RBC # BLD AUTO: 3.95 10*6/MM3 (ref 3.77–5.28)
SODIUM SERPL-SCNC: 139 MMOL/L (ref 136–145)
SODIUM SERPL-SCNC: 141 MMOL/L (ref 136–145)
TROPONIN T SERPL HS-MCNC: 31 NG/L
TSH SERPL DL<=0.05 MIU/L-ACNC: 0.82 UIU/ML (ref 0.27–4.2)
URATE SERPL-MCNC: 7.5 MG/DL (ref 2.4–5.7)
VARIANT LYMPHS NFR BLD MANUAL: 29.6 % (ref 19.6–45.3)
WBC MORPH BLD: NORMAL
WBC NRBC COR # BLD AUTO: 3.31 10*3/MM3 (ref 3.4–10.8)
WBC NRBC COR # BLD AUTO: 3.85 10*3/MM3 (ref 3.4–10.8)

## 2024-04-25 PROCEDURE — 99285 EMERGENCY DEPT VISIT HI MDM: CPT

## 2024-04-25 PROCEDURE — 99214 OFFICE O/P EST MOD 30 MIN: CPT | Performed by: INTERNAL MEDICINE

## 2024-04-25 PROCEDURE — 71045 X-RAY EXAM CHEST 1 VIEW: CPT

## 2024-04-25 PROCEDURE — G0378 HOSPITAL OBSERVATION PER HR: HCPCS

## 2024-04-25 PROCEDURE — 93005 ELECTROCARDIOGRAM TRACING: CPT | Performed by: EMERGENCY MEDICINE

## 2024-04-25 PROCEDURE — 96366 THER/PROPH/DIAG IV INF ADDON: CPT

## 2024-04-25 PROCEDURE — 84484 ASSAY OF TROPONIN QUANT: CPT | Performed by: PHYSICIAN ASSISTANT

## 2024-04-25 PROCEDURE — 25010000002 MAGNESIUM SULFATE 2 GM/50ML SOLUTION: Performed by: PHYSICIAN ASSISTANT

## 2024-04-25 PROCEDURE — 85027 COMPLETE CBC AUTOMATED: CPT | Performed by: HOSPITALIST

## 2024-04-25 PROCEDURE — 93010 ELECTROCARDIOGRAM REPORT: CPT | Performed by: INTERNAL MEDICINE

## 2024-04-25 PROCEDURE — 85007 BL SMEAR W/DIFF WBC COUNT: CPT | Performed by: PHYSICIAN ASSISTANT

## 2024-04-25 PROCEDURE — 83735 ASSAY OF MAGNESIUM: CPT | Performed by: PHYSICIAN ASSISTANT

## 2024-04-25 PROCEDURE — 93005 ELECTROCARDIOGRAM TRACING: CPT | Performed by: PHYSICIAN ASSISTANT

## 2024-04-25 PROCEDURE — 84443 ASSAY THYROID STIM HORMONE: CPT | Performed by: INTERNAL MEDICINE

## 2024-04-25 PROCEDURE — 25810000003 SODIUM CHLORIDE 0.9 % SOLUTION: Performed by: PHYSICIAN ASSISTANT

## 2024-04-25 PROCEDURE — 85025 COMPLETE CBC W/AUTO DIFF WBC: CPT | Performed by: PHYSICIAN ASSISTANT

## 2024-04-25 PROCEDURE — 83880 ASSAY OF NATRIURETIC PEPTIDE: CPT | Performed by: PHYSICIAN ASSISTANT

## 2024-04-25 PROCEDURE — 84550 ASSAY OF BLOOD/URIC ACID: CPT | Performed by: HOSPITALIST

## 2024-04-25 PROCEDURE — 96365 THER/PROPH/DIAG IV INF INIT: CPT

## 2024-04-25 PROCEDURE — 80053 COMPREHEN METABOLIC PANEL: CPT | Performed by: HOSPITALIST

## 2024-04-25 RX ORDER — POLYETHYLENE GLYCOL 3350 17 G/17G
17 POWDER, FOR SOLUTION ORAL DAILY PRN
Status: DISCONTINUED | OUTPATIENT
Start: 2024-04-25 | End: 2024-04-27 | Stop reason: HOSPADM

## 2024-04-25 RX ORDER — POTASSIUM CHLORIDE 20 MEQ/1
20 TABLET, EXTENDED RELEASE ORAL DAILY
Qty: 30 TABLET | Refills: 0 | Status: SHIPPED | OUTPATIENT
Start: 2024-04-25

## 2024-04-25 RX ORDER — MAGNESIUM SULFATE HEPTAHYDRATE 40 MG/ML
2 INJECTION, SOLUTION INTRAVENOUS
Status: COMPLETED | OUTPATIENT
Start: 2024-04-25 | End: 2024-04-26

## 2024-04-25 RX ORDER — BISACODYL 10 MG
10 SUPPOSITORY, RECTAL RECTAL DAILY PRN
Status: DISCONTINUED | OUTPATIENT
Start: 2024-04-25 | End: 2024-04-27 | Stop reason: HOSPADM

## 2024-04-25 RX ORDER — ONDANSETRON 2 MG/ML
4 INJECTION INTRAMUSCULAR; INTRAVENOUS EVERY 6 HOURS PRN
Status: DISCONTINUED | OUTPATIENT
Start: 2024-04-25 | End: 2024-04-27 | Stop reason: HOSPADM

## 2024-04-25 RX ORDER — BISACODYL 5 MG/1
5 TABLET, DELAYED RELEASE ORAL DAILY PRN
Status: DISCONTINUED | OUTPATIENT
Start: 2024-04-25 | End: 2024-04-27 | Stop reason: HOSPADM

## 2024-04-25 RX ORDER — AMOXICILLIN 250 MG
2 CAPSULE ORAL 2 TIMES DAILY PRN
Status: DISCONTINUED | OUTPATIENT
Start: 2024-04-25 | End: 2024-04-27 | Stop reason: HOSPADM

## 2024-04-25 RX ORDER — FUROSEMIDE 40 MG/1
40 TABLET ORAL DAILY
Qty: 30 TABLET | Refills: 0 | Status: SHIPPED | OUTPATIENT
Start: 2024-04-25

## 2024-04-25 RX ORDER — ACETAMINOPHEN 325 MG/1
650 TABLET ORAL EVERY 4 HOURS PRN
Status: DISCONTINUED | OUTPATIENT
Start: 2024-04-25 | End: 2024-04-27 | Stop reason: HOSPADM

## 2024-04-25 RX ORDER — SODIUM CHLORIDE 0.9 % (FLUSH) 0.9 %
10 SYRINGE (ML) INJECTION AS NEEDED
Status: DISCONTINUED | OUTPATIENT
Start: 2024-04-25 | End: 2024-04-27 | Stop reason: HOSPADM

## 2024-04-25 RX ORDER — ALUMINA, MAGNESIA, AND SIMETHICONE 2400; 2400; 240 MG/30ML; MG/30ML; MG/30ML
15 SUSPENSION ORAL EVERY 6 HOURS PRN
Status: DISCONTINUED | OUTPATIENT
Start: 2024-04-25 | End: 2024-04-27 | Stop reason: HOSPADM

## 2024-04-25 RX ORDER — UREA 10 %
3 LOTION (ML) TOPICAL NIGHTLY PRN
Status: DISCONTINUED | OUTPATIENT
Start: 2024-04-25 | End: 2024-04-27 | Stop reason: HOSPADM

## 2024-04-25 RX ORDER — FUROSEMIDE 40 MG/1
40 TABLET ORAL DAILY
Status: DISCONTINUED | OUTPATIENT
Start: 2024-04-25 | End: 2024-04-25 | Stop reason: HOSPADM

## 2024-04-25 RX ORDER — ONDANSETRON 4 MG/1
4 TABLET, ORALLY DISINTEGRATING ORAL EVERY 6 HOURS PRN
Status: DISCONTINUED | OUTPATIENT
Start: 2024-04-25 | End: 2024-04-27 | Stop reason: HOSPADM

## 2024-04-25 RX ORDER — POTASSIUM CHLORIDE 750 MG/1
40 TABLET, FILM COATED, EXTENDED RELEASE ORAL EVERY 4 HOURS
Status: DISPENSED | OUTPATIENT
Start: 2024-04-25 | End: 2024-04-26

## 2024-04-25 RX ORDER — NITROGLYCERIN 0.4 MG/1
0.4 TABLET SUBLINGUAL
Status: DISCONTINUED | OUTPATIENT
Start: 2024-04-25 | End: 2024-04-27 | Stop reason: HOSPADM

## 2024-04-25 RX ADMIN — FUROSEMIDE 40 MG: 40 TABLET ORAL at 10:07

## 2024-04-25 RX ADMIN — SODIUM CHLORIDE 250 ML: 9 INJECTION, SOLUTION INTRAVENOUS at 21:45

## 2024-04-25 RX ADMIN — POTASSIUM CHLORIDE 40 MEQ: 750 TABLET, EXTENDED RELEASE ORAL at 21:50

## 2024-04-25 RX ADMIN — MESALAMINE 1.5 G: 0.38 CAPSULE, EXTENDED RELEASE ORAL at 10:08

## 2024-04-25 RX ADMIN — MAGNESIUM SULFATE HEPTAHYDRATE 2 G: 40 INJECTION, SOLUTION INTRAVENOUS at 23:07

## 2024-04-25 RX ADMIN — MAGNESIUM OXIDE 400 MG (241.3 MG MAGNESIUM) TABLET 400 MG: TABLET at 10:07

## 2024-04-25 RX ADMIN — MAGNESIUM SULFATE HEPTAHYDRATE 2 G: 40 INJECTION, SOLUTION INTRAVENOUS at 21:52

## 2024-04-25 RX ADMIN — PANTOPRAZOLE SODIUM 40 MG: 40 TABLET, DELAYED RELEASE ORAL at 10:07

## 2024-04-25 RX ADMIN — VALACYCLOVIR HYDROCHLORIDE 1000 MG: 500 TABLET, FILM COATED ORAL at 10:07

## 2024-04-25 NOTE — OUTREACH NOTE
Prep Survey      Flowsheet Row Responses   [facility] patient discharged from? [place]   Is LACE score < 7 ? No   Eligibility [facility]   Date of Admission 04/23/24   Date of Discharge 04/25/24   Discharge Disposition Home or Self Care   Discharge diagnosis A/C CHF   Does the patient have one of the following disease processes/diagnoses(primary or secondary)? CHF   Does the patient have Home health ordered? No   Is there a DME ordered? No   Prep survey completed? Yes            Shania POTTS - Registered Nurse

## 2024-04-25 NOTE — DISCHARGE SUMMARY
Patient Name: Brittany Villeda  : 1935  MRN: [id number]    Date of Admission: 2024  Date of Discharge:  2024  Primary Care Physician: Mega Esparza MD      Chief Complaint:   Leg Swelling      Discharge Diagnoses     Active Hospital Problems    Diagnosis  POA    **Acute on chronic diastolic congestive heart failure [I50.33]  Yes    Bilateral lower extremity edema [R60.0]  Yes    Thrombocytopenia [D69.6]  Yes    COPD (chronic obstructive pulmonary disease) [J44.9]  Yes    Paroxysmal atrial fibrillation [I48.0]  Yes    Pulmonary hypertension [I27.20]  Yes    HUGO (obstructive sleep apnea) [G47.33]  Yes    Essential hypertension [I10]  Yes      Resolved Hospital Problems   No resolved problems to display.        Hospital Course     Ms. Villeda is a 89 y.o. female with a history of COPD, pulmonary hypertension, sleep apnea, HTN and paroxysmal A-fib (no AC due to prior GI bleed) who presented to [facility] initially complaining of lower extremity edema.  Please see the admitting history and physical for further details.  She was found to have decompensated heart failure and was admitted to the hospital for further evaluation and treatment.  She was seen by cardiology who recommended further IV diuresis.  With diuresis she is almost net 7 L output.  Her weight is down 6 pounds.  She feels better and lower extremity edema much improved.  Echocardiogram showed preserved ejection fraction.  Plan will be discharged home today on Lasix and follow-up with cardiology nurse practitioner next week.  It was suggested she start Jardiance at time of discharge but patient prefers to wait and discuss this further with her cardiologist, Dr. Culp.      Day of Discharge     Subjective:  Feels much better and eager to go home today.    Physical Exam:  Temp:  [97.5 °F (36.4 °C)-97.8 °F (36.6 °C)] 97.5 °F (36.4 °C)  Heart Rate:  [59-66] 60  Resp:  [16-17] 16  BP: (107-127)/(58-76) 107/58  Body mass  index is 29.63 kg/m².  Intake/Output Summary (Last 24 hours) at 4/25/2024 0943  Last data filed at 4/25/2024 0246  Gross per 24 hour   Intake 120 ml   Output 4550 ml   Net -4430 ml   Net IO Since Admission: -6,890 mL [04/25/24 0943]   Wt Readings from Last 1 Encounters:   04/25/24 0559 78.3 kg (172 lb 9.9 oz)   04/24/24 1310 77.6 kg (171 lb)   04/24/24 0513 77.6 kg (171 lb)   04/23/24 2051 80 kg (176 lb 6.4 oz)     Wt Readings from Last 3 Encounters:   04/25/24 78.3 kg (172 lb 9.9 oz)   04/22/24 83.4 kg (183 lb 12.8 oz)   03/25/24 83 kg (183 lb)       Physical Exam  Vitals and nursing note reviewed.   Constitutional:       General: She is not in acute distress.  Cardiovascular:      Rate and Rhythm: Normal rate and regular rhythm.      Heart sounds: Murmur heard.   Pulmonary:      Effort: Pulmonary effort is normal.      Breath sounds: Normal breath sounds.   Abdominal:      General: Bowel sounds are normal.      Palpations: Abdomen is soft.      Tenderness: There is no abdominal tenderness.   Musculoskeletal:         General: No swelling.      Comments: Minimal lower extremity edema   Skin:     General: Skin is warm and dry.   Neurological:      Mental Status: She is alert. Mental status is at baseline.         Consultants     Consult Orders (all) (From admission, onward)       Start     Ordered    04/24/24 1028  Inpatient Hospitalist Consult  Once        Specialty:  Hospitalist  Provider:  Kevin Leonard MD    04/24/24 1027    04/24/24 0702  Inpatient Cardiology Consult  IN AM        Specialty:  Cardiology  Provider:  James Singleton MD    04/23/24 2057             Procedures     Results for orders placed during the hospital encounter of 04/23/24    Adult Transthoracic Echo Complete W/ Cont if Necessary Per Protocol    Interpretation Summary    Left ventricular systolic function is normal. Left ventricular ejection fraction appears to be 61 - 65%.    Left ventricular diastolic function was indeterminate.     "The right ventricular cavity is mildly dilated. Normal right ventricular systolic function noted.    The left atrial cavity is severely dilated.    The right atrial cavity is severely dilated.    Saline test results are negative.    Mild aortic valve regurgitation is present.    Mild mitral valve regurgitation is present.    Mild to moderate tricuspid valve regurgitation is present.    Calculated right ventricular systolic pressure from tricuspid regurgitation is 38 mmHg.    There is no evidence of pericardial effusion.    Pertinent Labs     Results from last 7 days   Lab Units 04/25/24  0833 04/24/24  0521 04/23/24 1845 04/22/24  1011   WBC 10*3/mm3 3.31* 3.05* 3.13* 3.4   HEMOGLOBIN g/dL 12.8 12.8 12.6 13.2   PLATELETS 10*3/mm3 129* 113* 108* 127*     Results from last 7 days   Lab Units 04/25/24  0725 04/24/24  0521 04/23/24 1845 04/22/24  1011   SODIUM mmol/L 141 142 142 143   POTASSIUM mmol/L 3.7 4.1 4.7 4.9   CHLORIDE mmol/L 103 106 107 104   CO2 mmol/L 31.4* 28.9 25.9 25   BUN mg/dL 17 16 15 16   CREATININE mg/dL 1.00 0.81 0.85 0.95   GLUCOSE mg/dL 100* 89 107* 102*   EGFR mL/min/1.73 54.0* 69.5 65.6  --      Results from last 7 days   Lab Units 04/23/24 1845 04/22/24  1011   ALBUMIN g/dL 3.4* 4.1   BILIRUBIN mg/dL 1.1 1.2   ALK PHOS U/L 60 65   AST (SGOT) U/L 13 19   ALT (SGPT) U/L 9 12     Results from last 7 days   Lab Units 04/25/24  0725 04/24/24  0521 04/23/24 1845 04/22/24  1011   CALCIUM mg/dL 10.3 10.2 10.2 10.7*   ALBUMIN g/dL  --   --  3.4* 4.1       Results from last 7 days   Lab Units 04/23/24 1845   PROBNP pg/mL 579.0     Results from last 7 days   Lab Units 04/25/24  0725   URIC ACID mg/dL 7.5*         Invalid input(s): \"LDLCALC\"          Test Results Pending at Discharge       Discharge Details        Discharge Medications        Changes to Medications        Instructions Start Date   furosemide 40 MG tablet  Commonly known as: LASIX  What changed: Another medication with the same name " was removed. Continue taking this medication, and follow the directions you see here.   40 mg, Oral, Daily      potassium chloride 20 MEQ CR tablet  Commonly known as: KLOR-CON M20  What changed:   medication strength  how much to take   20 mEq, Oral, Daily             Continue These Medications        Instructions Start Date   acetaminophen 500 MG tablet  Commonly known as: TYLENOL   500 mg, Oral, Every 6 Hours PRN      clobetasol 0.05 % ointment  Commonly known as: TEMOVATE   1 Application, Topical, 2 Times Daily      clonazePAM 0.5 MG tablet  Commonly known as: KlonoPIN   0.5 mg, Oral, 2 Times Daily PRN      lidocaine 5 % ointment  Commonly known as: XYLOCAINE   1 Application, Topical, Daily      Magnesium Oxide -Mg Supplement 400 (240 Mg) MG tablet   400 mg, Oral, Daily      mesalamine 0.375 g 24 hr capsule  Commonly known as: APRISO   1.5 g, Oral, Daily      nystatin [id number] UNIT/GM ointment  Commonly known as: MYCOSTATIN   Apply 1 Application topically to the appropriate area as directed As Needed.      pantoprazole 40 MG EC tablet  Commonly known as: PROTONIX   40 mg, Oral, Daily      tacrolimus 0.1 % ointment  Commonly known as: PROTOPIC   1 Application, Topical, 2 Times Daily      valACYclovir 1000 MG tablet  Commonly known as: VALTREX   1,000 mg, Oral, Daily      vitamin B-12 1000 MCG tablet  Commonly known as: CYANOCOBALAMIN   1,000 mcg, Oral, Daily             Stop These Medications      albuterol sulfate  (90 Base) MCG/ACT inhaler  Commonly known as: PROVENTIL HFA;VENTOLIN HFA;PROAIR HFA     ELDERBERRY PO     psyllium 58.6 % packet  Commonly known as: METAMUCIL              Allergies   Allergen Reactions    Codeine Hallucinations     Tolerates hydromorphone    Amitriptyline Rash    Amoxicillin-Pot Clavulanate Rash    Aspirin Unknown - Low Severity     Patient doesn't know why    Carisoprodol-Aspirin-Codeine Palpitations    Iodinated Contrast Media Rash    Latex Rash    Naproxen Rash    Nsaids  Unknown - Low Severity     UNSURE OF REACTION    Soma Compound With Codeine [Carisoprodol-Aspirin-Codeine] Rash    Sulfa Antibiotics Rash    Tramadol Palpitations     heart races        Discharge Disposition:  Home or Self Care      Discharge Diet:  Diet Order   Procedures    Diet: Cardiac; Healthy Heart (2-3 Na+); Fluid Consistency: Thin (IDDSI 0)       Discharge Activity:   Activity Instructions       Activity as Tolerated      Measure Weight Daily      Instruct patient on daily weights            CODE STATUS:    Code Status and Medical Interventions:   Ordered at: 04/24/24 1027     Level Of Support Discussed With:    Patient     Code Status (Patient has no pulse and is not breathing):    CPR (Attempt to Resuscitate)     Medical Interventions (Patient has pulse or is breathing):    Full Support       Future Appointments   Date Time Provider Department Center   6/3/2024 11:15 AM Micaela English MD [facility] EA ELLY [facility]   10/10/2024  8:30 AM [facility] DEVICE CHECK [facility] [id number] None   10/10/2024  9:00 AM Mary Grace Culp MD [facility] LCGKR [facility]   10/22/2024  9:45 AM Mega Esparza MD [facility] EASPT [facility]     Additional Instructions for the Follow-ups that You Need to Schedule       Discharge Follow-up with PCP   As directed       Currently Documented PCP:    Mega Esparza MD    PCP Phone Number:    [phone]     Follow Up Details: 1 to 2 weeks (or sooner if problems)               Follow-up Information       Mega Esparza MD .    Specialty: Family Medicine  Why: 1 to 2 weeks (or sooner if problems)  Contact information:  [address]  [facility] [address] [phone]               [facility] .    Specialty: Cardiology  Contact information:  [address]  [facility] [facility] [address] [phone]                           Additional Instructions for the Follow-ups that You Need to Schedule       Discharge Follow-up with PCP   As directed        Currently Documented PCP:    Mega Esparza MD    PCP Phone Number:    [phone]     Follow Up Details: 1 to 2 weeks (or sooner if problems)            Time Spent on Discharge:  Greater than 30 minutes      Kevin Leonard MD  [facility]  04/25/24  09:02 EDT

## 2024-04-26 ENCOUNTER — TRANSITIONAL CARE MANAGEMENT TELEPHONE ENCOUNTER (OUTPATIENT)
Dept: CALL CENTER | Facility: HOSPITAL | Age: 89
End: 2024-04-26
Payer: MEDICARE

## 2024-04-26 PROBLEM — E87.6 HYPOKALEMIA: Status: ACTIVE | Noted: 2024-04-26

## 2024-04-26 PROBLEM — N17.9 AKI (ACUTE KIDNEY INJURY): Status: ACTIVE | Noted: 2024-04-25

## 2024-04-26 PROBLEM — I50.32 CHRONIC DIASTOLIC CONGESTIVE HEART FAILURE: Status: ACTIVE | Noted: 2024-04-24

## 2024-04-26 LAB
ALBUMIN SERPL-MCNC: 3.6 G/DL (ref 3.5–5.2)
ALBUMIN/GLOB SERPL: 1.3 G/DL
ALP SERPL-CCNC: 56 U/L (ref 39–117)
ALT SERPL W P-5'-P-CCNC: 11 U/L (ref 1–33)
ANION GAP SERPL CALCULATED.3IONS-SCNC: 8 MMOL/L (ref 5–15)
AST SERPL-CCNC: 13 U/L (ref 1–32)
BACTERIA UR QL AUTO: NORMAL /HPF
BILIRUB SERPL-MCNC: 1.7 MG/DL (ref 0–1.2)
BILIRUB UR QL STRIP: NEGATIVE
BUN SERPL-MCNC: 19 MG/DL (ref 8–23)
BUN/CREAT SERPL: 17.8 (ref 7–25)
CALCIUM SPEC-SCNC: 9.8 MG/DL (ref 8.6–10.5)
CHLORIDE SERPL-SCNC: 101 MMOL/L (ref 98–107)
CLARITY UR: CLEAR
CO2 SERPL-SCNC: 29 MMOL/L (ref 22–29)
COLOR UR: YELLOW
CREAT SERPL-MCNC: 1.07 MG/DL (ref 0.57–1)
DEPRECATED RDW RBC AUTO: 51.4 FL (ref 37–54)
EGFRCR SERPLBLD CKD-EPI 2021: 49.8 ML/MIN/1.73
ERYTHROCYTE [DISTWIDTH] IN BLOOD BY AUTOMATED COUNT: 13.3 % (ref 12.3–15.4)
GLOBULIN UR ELPH-MCNC: 2.7 GM/DL
GLUCOSE SERPL-MCNC: 104 MG/DL (ref 65–99)
GLUCOSE UR STRIP-MCNC: NEGATIVE MG/DL
HCT VFR BLD AUTO: 38.9 % (ref 34–46.6)
HGB BLD-MCNC: 13.3 G/DL (ref 12–15.9)
HGB UR QL STRIP.AUTO: NEGATIVE
HYALINE CASTS UR QL AUTO: NORMAL /LPF
KETONES UR QL STRIP: NEGATIVE
LEUKOCYTE ESTERASE UR QL STRIP.AUTO: ABNORMAL
MAGNESIUM SERPL-MCNC: 3.6 MG/DL (ref 1.6–2.4)
MCH RBC QN AUTO: 35.2 PG (ref 26.6–33)
MCHC RBC AUTO-ENTMCNC: 34.2 G/DL (ref 31.5–35.7)
MCV RBC AUTO: 102.9 FL (ref 79–97)
NITRITE UR QL STRIP: NEGATIVE
PH UR STRIP.AUTO: 6 [PH] (ref 5–8)
PHOSPHATE SERPL-MCNC: 3.1 MG/DL (ref 2.5–4.5)
PLATELET # BLD AUTO: 116 10*3/MM3 (ref 140–450)
PMV BLD AUTO: 9 FL (ref 6–12)
POTASSIUM SERPL-SCNC: 3.9 MMOL/L (ref 3.5–5.2)
POTASSIUM SERPL-SCNC: 4.1 MMOL/L (ref 3.5–5.2)
PROT SERPL-MCNC: 6.3 G/DL (ref 6–8.5)
PROT UR QL STRIP: NEGATIVE
QT INTERVAL: 452 MS
QT INTERVAL: 466 MS
QTC INTERVAL: 456 MS
QTC INTERVAL: 466 MS
RBC # BLD AUTO: 3.78 10*6/MM3 (ref 3.77–5.28)
RBC # UR STRIP: NORMAL /HPF
REF LAB TEST METHOD: NORMAL
SODIUM SERPL-SCNC: 138 MMOL/L (ref 136–145)
SP GR UR STRIP: 1.02 (ref 1–1.03)
SQUAMOUS #/AREA URNS HPF: NORMAL /HPF
UROBILINOGEN UR QL STRIP: ABNORMAL
WBC # UR STRIP: NORMAL /HPF
WBC NRBC COR # BLD AUTO: 3.69 10*3/MM3 (ref 3.4–10.8)

## 2024-04-26 PROCEDURE — G0378 HOSPITAL OBSERVATION PER HR: HCPCS

## 2024-04-26 PROCEDURE — 97530 THERAPEUTIC ACTIVITIES: CPT

## 2024-04-26 PROCEDURE — 25810000003 SODIUM CHLORIDE 0.9 % SOLUTION: Performed by: NURSE PRACTITIONER

## 2024-04-26 PROCEDURE — 96366 THER/PROPH/DIAG IV INF ADDON: CPT

## 2024-04-26 PROCEDURE — 83735 ASSAY OF MAGNESIUM: CPT | Performed by: NURSE PRACTITIONER

## 2024-04-26 PROCEDURE — 96361 HYDRATE IV INFUSION ADD-ON: CPT

## 2024-04-26 PROCEDURE — 84100 ASSAY OF PHOSPHORUS: CPT | Performed by: NURSE PRACTITIONER

## 2024-04-26 PROCEDURE — 80053 COMPREHEN METABOLIC PANEL: CPT | Performed by: NURSE PRACTITIONER

## 2024-04-26 PROCEDURE — 36415 COLL VENOUS BLD VENIPUNCTURE: CPT | Performed by: PHYSICIAN ASSISTANT

## 2024-04-26 PROCEDURE — 84132 ASSAY OF SERUM POTASSIUM: CPT | Performed by: PHYSICIAN ASSISTANT

## 2024-04-26 PROCEDURE — 81001 URINALYSIS AUTO W/SCOPE: CPT | Performed by: PHYSICIAN ASSISTANT

## 2024-04-26 PROCEDURE — 85027 COMPLETE CBC AUTOMATED: CPT | Performed by: NURSE PRACTITIONER

## 2024-04-26 PROCEDURE — 97162 PT EVAL MOD COMPLEX 30 MIN: CPT

## 2024-04-26 PROCEDURE — 25010000002 MAGNESIUM SULFATE 2 GM/50ML SOLUTION: Performed by: PHYSICIAN ASSISTANT

## 2024-04-26 RX ORDER — PANTOPRAZOLE SODIUM 40 MG/1
40 TABLET, DELAYED RELEASE ORAL DAILY
Status: DISCONTINUED | OUTPATIENT
Start: 2024-04-26 | End: 2024-04-27 | Stop reason: HOSPADM

## 2024-04-26 RX ORDER — FUROSEMIDE 40 MG/1
40 TABLET ORAL DAILY
Status: DISCONTINUED | OUTPATIENT
Start: 2024-04-26 | End: 2024-04-27 | Stop reason: HOSPADM

## 2024-04-26 RX ORDER — TACROLIMUS 1 MG/G
1 OINTMENT TOPICAL 2 TIMES DAILY
Status: DISCONTINUED | OUTPATIENT
Start: 2024-04-26 | End: 2024-04-26

## 2024-04-26 RX ORDER — VALACYCLOVIR HYDROCHLORIDE 500 MG/1
1000 TABLET, FILM COATED ORAL DAILY
Status: DISCONTINUED | OUTPATIENT
Start: 2024-04-26 | End: 2024-04-27 | Stop reason: HOSPADM

## 2024-04-26 RX ORDER — MESALAMINE 0.38 G/1
1.5 CAPSULE, EXTENDED RELEASE ORAL DAILY
Status: DISCONTINUED | OUTPATIENT
Start: 2024-04-26 | End: 2024-04-27 | Stop reason: HOSPADM

## 2024-04-26 RX ORDER — CLONAZEPAM 0.5 MG/1
0.5 TABLET ORAL 2 TIMES DAILY PRN
Status: DISCONTINUED | OUTPATIENT
Start: 2024-04-26 | End: 2024-04-27 | Stop reason: HOSPADM

## 2024-04-26 RX ORDER — MULTIPLE VITAMINS W/ MINERALS TAB 9MG-400MCG
1 TAB ORAL DAILY
Status: DISCONTINUED | OUTPATIENT
Start: 2024-04-26 | End: 2024-04-27 | Stop reason: HOSPADM

## 2024-04-26 RX ORDER — CHOLECALCIFEROL (VITAMIN D3) 125 MCG
1000 CAPSULE ORAL DAILY
Status: DISCONTINUED | OUTPATIENT
Start: 2024-04-26 | End: 2024-04-27 | Stop reason: HOSPADM

## 2024-04-26 RX ORDER — CLOBETASOL PROPIONATE 0.5 MG/G
CREAM TOPICAL EVERY 12 HOURS SCHEDULED
Status: DISCONTINUED | OUTPATIENT
Start: 2024-04-26 | End: 2024-04-27 | Stop reason: HOSPADM

## 2024-04-26 RX ADMIN — VALACYCLOVIR HYDROCHLORIDE 1000 MG: 500 TABLET, FILM COATED ORAL at 09:05

## 2024-04-26 RX ADMIN — FUROSEMIDE 40 MG: 40 TABLET ORAL at 14:47

## 2024-04-26 RX ADMIN — PANTOPRAZOLE SODIUM 40 MG: 40 TABLET, DELAYED RELEASE ORAL at 09:05

## 2024-04-26 RX ADMIN — MESALAMINE 1.5 G: 0.38 CAPSULE, EXTENDED RELEASE ORAL at 09:05

## 2024-04-26 RX ADMIN — POTASSIUM CHLORIDE 40 MEQ: 750 TABLET, EXTENDED RELEASE ORAL at 01:49

## 2024-04-26 RX ADMIN — Medication 1 TABLET: at 16:31

## 2024-04-26 RX ADMIN — MAGNESIUM SULFATE HEPTAHYDRATE 2 G: 40 INJECTION, SOLUTION INTRAVENOUS at 01:10

## 2024-04-26 RX ADMIN — Medication 1 APPLICATION: at 14:38

## 2024-04-26 RX ADMIN — Medication 1 APPLICATION: at 20:39

## 2024-04-26 RX ADMIN — Medication 1000 MCG: at 09:05

## 2024-04-26 RX ADMIN — CLOBETASOL PROPIONATE CREAM USP, 0.05%: 0.5 CREAM TOPICAL at 20:39

## 2024-04-26 RX ADMIN — CLOBETASOL PROPIONATE CREAM USP, 0.05%: 0.5 CREAM TOPICAL at 09:05

## 2024-04-26 RX ADMIN — SODIUM CHLORIDE 250 ML: 900 INJECTION, SOLUTION INTRAVENOUS at 01:50

## 2024-04-26 NOTE — PLAN OF CARE
Goal Outcome Evaluation:  Plan of Care Reviewed With: patient, daughter           Outcome Evaluation: Pt is an 89 y/o F presenting with c/o SOA and generalized weakness, further workup revealing acute renal insufficiency. Pt recently d/c'd from [facility] on 4/25, admitted with BLE edema and weeping. PMH includes HTN, a-fib, HF, pacemaker, and renal failure. Pt greeted in supine, agreeable to PT evaluation this PM. Daughter present throughout session. A&O x4. Reports living in single-story home with son, ramp to enter, mod-I with RW. Daughter reports that someone is typically at home with the pt 24/7. This date, pt presents with reduced balance, strength, and endurance, limiting her overall mobility. Transfers to EOB with SBA, completes STS and ambulates x80 ft with CGA and RW. Demonstrates reduced gait speed, fwd flexed posture, and downward gaze. Limited in distance d/t fatigue. Denies pain or SOA. Returned pt to bed and left in supine. PT will continue to monitor throughout course of care. Recommending d/c home with HH.      Anticipated Discharge Disposition (PT): home with assist, home with home health

## 2024-04-26 NOTE — PROGRESS NOTES
Nutrition Services    Patient Name:  Brittany Villeda  YOB: 1935  MRN: [id number]  Admit Date:  4/25/2024Assessment Date:  04/26/24    Summary: Screen for PI. 89 yoF admitted 4/25 with SOB and weakness. PMH: L medial glute wound, CHF, COPD, current RACH. Labs and meds reviewed. Noted pt on lasix d/t fluid- weight loss seen d/t fluid status. Pt states she has lost about 8# and related this to fluid loss. #. She denies any changes in appetite and is currently eating 50-75%. Denies chewing/swallowing difficulty or N/V/C/D. Agreeable to ti to aid in wound healing. RD will follow per protocol.     PLAN  - Ti bid  - daily MVI  - encourage intakes       CLINICAL NUTRITION ASSESSMENT      Reason for Assessment Pressure Injury and/or Non-Healing Wound     Diagnosis/Problem   RACH   Medical/Surgical History Past Medical History:   Diagnosis Date    Abdominal aortic aneurysm     Anemia     Arrhythmia     Arthritis     Asthma     Bradycardia     Choledocholithiasis 05/09/2021    Colitis     Diastolic dysfunction     Essential hypertension 05/12/2016    GERD (gastroesophageal reflux disease)     Heart block     Hypertension     Hypertensive heart disease     Hyperthyroidism     REPORTS ENLARGED    Inguinal hernia     Kidney stone     Leukopenia     MGUS (monoclonal gammopathy of unknown significance)     Nephrolithiasis     Pacemaker     Paroxysmal atrial fibrillation     Peptic ulceration     Pressure ulcer     REPORTS ON COCCYX. INSTR TO NOTIFY DR BAIRES'S OFFICE    PSVT (paroxysmal supraventricular tachycardia)     Pulmonary hypertension     Rectal bleed     Sleep apnea     NO DEVICE    Subdural hematoma     Systemic hypertension     Trifascicular block     Ulcerative rectosigmoiditis without complication     Ventricular tachycardia     nonsustained       Past Surgical History:   Procedure Laterality Date    BACK SURGERY      lumbar fusion    PAWAN HOLE Left 08/08/2021    Procedure: Left-sided  "dusty holes for evacuation of subdural hematoma;  Surgeon: Gustavo Callaway MD;  Location: [facility] MAIN OR;  Service: Neurosurgery;  Laterality: Left;    CARDIAC ELECTROPHYSIOLOGY PROCEDURE N/A 11/07/2018    Procedure: Pacemaker DC new   [place];  Surgeon: Jesus Granda MD;  Location: [facility] CATH INVASIVE LOCATION;  Service: Cardiology    CHOLECYSTECTOMY      COLONOSCOPY  06/16/2014    colitis, cryptitis,  tics, NBIH, TA w/low grade dysplasia    COLONOSCOPY N/A 10/28/2019    Procedure: COLONOSCOPY WITH COLD AND HOT POLYPECTOMIES;  Surgeon: Micaela English MD;  Location: [facility] ENDOSCOPY;  Service: Gastroenterology    ENDOSCOPY N/A 05/13/2021    Procedure: ESOPHAGOGASTRODUODENOSCOPY with BX;  Surgeon: Stefan Mcfadden MD;  Location: [facility] ENDOSCOPY;  Service: Gastroenterology;  Laterality: N/A;  EPIGASTRIC PAIN  --DUODENAL ULCER, HEMORRHAGIC GASTRITIS, HIATAL HERNIA     ENDOSCOPY N/A 10/11/2022    Procedure: ESOPHAGOGASTRODUODENOSCOPY;  Surgeon: Micaela English MD;  Location: [facility] ENDOSCOPY;  Service: Gastroenterology;  Laterality: N/A;  PRE- MELENA  POST- ESOPHAGITIS    HEMORRHOIDECTOMY      HERNIA REPAIR      umbilical    HYSTERECTOMY      INGUINAL HERNIA REPAIR Left 9/1/2023    Procedure: INGUINAL HERNIA REPAIR LEFT;  Surgeon: Antonio Foster MD;  Location: [facility] OR OSC;  Service: General;  Laterality: Left;    JOINT REPLACEMENT Bilateral     KNEES    MYOMECTOMY      PACEMAKER IMPLANTATION      SHOULDER SURGERY      SIGMOIDOSCOPY N/A 11/02/2022    Procedure: SIGMOIDOSCOPY FLEXIBLE WITH COLD BIOPSIES AND ASPIRATE;  Surgeon: Micaela English MD;  Location: [facility] ENDOSCOPY;  Service: Gastroenterology;  Laterality: N/A;  PRE- RECTAL BLEEDING  POST- HEMORRHOIDS, DIVERTICULOSIS, COLITIS    SINUS SURGERY      TOE NAIL AMPUTATION  03/04/2019    TONSILLECTOMY          Anthropometrics        Current Height  Current Weight  BMI kg/m2 Height: 162.6 cm (64\")  Weight: 74.9 kg (165 lb 1.6 oz) (04/26/24 " 1011)  Body mass index is 28.34 kg/m².   Adjusted BMI (if applicable)    BMI Category Overweight (25 - 29.9)   Ideal Body Weight (IBW) 120#   Usual Body Weight (UBW) 175#   Weight Trend Stable, Loss   Weight History Wt Readings from Last 30 Encounters:   04/26/24 1011 74.9 kg (165 lb 1.6 oz)   04/25/24 1943 78 kg (172 lb)   04/25/24 0559 78.3 kg (172 lb 9.9 oz)   04/24/24 1310 77.6 kg (171 lb)   04/24/24 0513 77.6 kg (171 lb)   04/23/24 2051 80 kg (176 lb 6.4 oz)   04/22/24 0937 83.4 kg (183 lb 12.8 oz)   03/25/24 0945 83 kg (183 lb)   02/16/24 1151 84.4 kg (186 lb)   02/01/24 0955 84.4 kg (186 lb)   01/12/24 0924 84.7 kg (186 lb 12.8 oz)   11/13/23 0957 80.6 kg (177 lb 9.6 oz)   10/13/23 0939 81.1 kg (178 lb 12.8 oz)   09/25/23 0848 80.3 kg (177 lb)   09/14/23 1045 80.4 kg (177 lb 3.2 oz)   09/08/23 1337 80.1 kg (176 lb 8 oz)   09/01/23 1453 78.9 kg (173 lb 14.4 oz)   08/28/23 1251 78.9 kg (173 lb 14.4 oz)   08/21/23 1359 78.5 kg (173 lb)   08/10/23 1331 78.5 kg (173 lb)   08/04/23 0924 78.5 kg (173 lb)   07/26/23 1133 79.8 kg (176 lb)   07/25/23 1058 79 kg (174 lb 3.2 oz)   06/30/23 0909 84.6 kg (186 lb 6.4 oz)   04/21/23 0959 86 kg (189 lb 9.6 oz)   04/15/23 1717 84.6 kg (186 lb 9.6 oz)   03/24/23 0915 37.9 kg (83 lb 8 oz)   03/13/23 1409 83.5 kg (184 lb)   03/13/23 1055 86 kg (189 lb 9.6 oz)   03/13/23 0913 86.3 kg (190 lb 3.2 oz)   03/07/23 1142 87.2 kg (192 lb 4.8 oz)   02/17/23 1042 88.2 kg (194 lb 8 oz)   01/20/23 1355 85.4 kg (188 lb 4.8 oz)   11/30/22 1544 81.5 kg (179 lb 9.6 oz)      --  Labs       Pertinent Labs    Results from last 7 days   Lab Units 04/26/24  1055 04/26/24  0337 04/25/24  2054 04/25/24  0725 04/24/24  0521 04/23/24  1845   SODIUM mmol/L  --  138 139 141   < > 142   POTASSIUM mmol/L 3.9 4.1 3.1* 3.7   < > 4.7   CHLORIDE mmol/L  --  101 102 103   < > 107   CO2 mmol/L  --  29.0 25.3 31.4*   < > 25.9   BUN mg/dL  --  19 21 17   < > 15   CREATININE mg/dL  --  1.07* 1.23* 1.00   < > 0.85    CALCIUM mg/dL  --  9.8 10.1 10.3   < > 10.2   BILIRUBIN mg/dL  --  1.7* 1.9*  --   --  1.1   ALK PHOS U/L  --  56 60  --   --  60   ALT (SGPT) U/L  --  11 7  --   --  9   AST (SGOT) U/L  --  13 16  --   --  13   GLUCOSE mg/dL  --  104* 147* 100*   < > 107*    < > = values in this interval not displayed.     Results from last 7 days   Lab Units 04/26/24 0337 04/26/24 0329 04/25/24 2054   MAGNESIUM mg/dL 3.6*  --  1.5*   PHOSPHORUS mg/dL 3.1  --   --    HEMOGLOBIN g/dL  --  13.3 14.0   HEMATOCRIT %  --  38.9 41.8   WBC 10*3/mm3  --  3.69 3.85   ALBUMIN g/dL 3.6  --  3.5     Results from last 7 days   Lab Units 04/26/24 0329 04/25/24 2054 04/25/24  0833 04/24/24  0521 04/23/24  1845   PLATELETS 10*3/mm3 116* 115* 129* 113* 108*     COVID19   Date Value Ref Range Status   11/02/2022 Not Detected Not Detected - Ref. Range Final     Lab Results   Component Value Date    HGBA1C 5.4 04/25/2022          Medications           Scheduled Medications clobetasol propionate, , Topical, Q12H  furosemide, 40 mg, Oral, Daily  Menthol-Zinc Oxide, 1 Application, Topical, Q12H  mesalamine, 1.5 g, Oral, Daily  pantoprazole, 40 mg, Oral, Daily  valACYclovir, 1,000 mg, Oral, Daily  vitamin B-12, 1,000 mcg, Oral, Daily       Infusions     PRN Medications   acetaminophen    aluminum-magnesium hydroxide-simethicone    senna-docusate sodium **AND** polyethylene glycol **AND** bisacodyl **AND** bisacodyl    clonazePAM    Magnesium Standard Dose Replacement - Follow Nurse / BPA Driven Protocol    melatonin    nitroglycerin    ondansetron ODT **OR** ondansetron    Potassium Replacement - Follow Nurse / BPA Driven Protocol    sodium chloride     Physical Findings          General Findings alert, hard of hearing, oriented, overweight, room air   Oral/Mouth Cavity WDL, dental appliance   Edema  2+ (mild), 3+ (moderate)   Gastrointestinal WDL, last bowel movement: 4/22   Skin  pressure injury: left medial gluteal   Tubes/Drains/Lines none    NFPE Not indicated at this time   --  Current Nutrition Orders & Evaluation of Intake       Oral Nutrition     Food Allergies NKFA   Current PO Diet Diet: Cardiac; Healthy Heart (2-3 Na+); Fluid Consistency: Thin (IDDSI 0)   Supplement n/a   PO Evaluation     % PO Intake 50-75%    Factors Affecting Intake: No factors at this time   --  PES STATEMENT / NUTRITION DIAGNOSIS      Nutrition Dx Problem  Problem: Increased Nutrient Needs  Etiology: Medical Diagnosis - pressure injury     Signs/Symptoms: Other (comment) loss of skin integrity      NUTRITION INTERVENTION / PLAN OF CARE      Intervention Goal(s) Meet estimated needs, Disease management/therapy, Maintain intake, and Accepts oral nutrition supplement         RD Intervention/Action Supplement provided, Encourage intake, and Continue to monitor   --      Prescription/Orders:       PO Diet Heart healthy       Supplements Ti BID, daily MVI      Enteral Nutrition       Parenteral Nutrition    New Prescription Ordered? Yes   --      Monitor/Evaluation Per protocol, PO intake, Supplement intake, Skin status   Discharge Plan/Needs No discharge needs identified at this time   --    RD to follow per protocol.      Electronically signed by:  Bailey Mas RD  04/26/24 16:03 EDT

## 2024-04-26 NOTE — OUTREACH NOTE
Call Center TCM Note      Flowsheet Row Responses   [facility] patient discharged from? [place]   Does the patient have one of the following disease processes/diagnoses(primary or secondary)? CHF   TCM attempt successful? No   Unsuccessful attempts Attempt 1   Change in Health Status Readmitted             Zoë Mojica LPN    4/26/2024, 08:18 EDT

## 2024-04-26 NOTE — ED PROVIDER NOTES
EMERGENCY DEPARTMENT ENCOUNTER      PCP: Mega Esparza MD  Patient Care Team:  Mega Esparza MD as PCP - General (Family Medicine)  Micaela English MD as Consulting Physician (Gastroenterology)  Mary Grace Culp MD as Consulting Physician (Cardiology)  Jp Krause Jr., MD as Consulting Physician (Hematology and Oncology)  Yasmeen Pichardo [facility] as Pharmacist  Micaela English MD as Referring Physician (Gastroenterology)  Devon Valadez MD as Consulting Physician (Hematology and Oncology)  Rusty Interiano, JamarD as Pharmacist (Pharmacy)  Catie Jules PA as Physician Assistant (Gastroenterology)  Antonio Foster MD as Surgeon (General Surgery)  Valeria Darling APRN as Nurse Practitioner (Obstetrics and Gynecology)   Independent Historians: Patient, family at bedside    HPI:  Chief Complaint: Shortness of breath  A complete HPI/ROS/PMH/PSH/SH/FH are unobtainable due to: None    Chronic or social conditions impacting patient care (social determinants of health): None    Context: Brittany Villeda is a 89 y.o. female with history of COPD, CHF who presents to the ED from home via EMS c/o acute weakness and shortness of breath that occurred at home prior to arrival.  Patient was discharged from the hospital today after being admitted on 4/23 for acute on chronic diastolic congestive heart failure.  Patient states she ate dinner and then was ambulating using her walker back to her room when she felt like her legs are going to give out.  She states she made it to her room and felt she was going to fall but ended up falling backwards on her bed.  She reports feeling very short of breath as well.  Family states patient got diaphoretic and had perioral cyanosis and her fingertips were blue.  Patient denies having any chest pain.  Her leg swelling is improved from a few days ago when she was admitted.    Review of prior external notes and/or external test results outside of this encounter: Reviewed discharge  summary from 4/25/2024.  Patient was admitted on 4/23 for bilateral lower extremity edema.  She was diuresed.  Discharged home on Lasix and plan to follow-up with cardiology nurse practitioner next week.      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    MGUS (monoclonal gammopathy of unknown significance) 09/16/2016    Ulcerative rectosigmoiditis without complication 11/30/2017    Lichen sclerosus 08/23/2017    Heart block 10/30/2018    Sleep-related hypoxia 12/22/2018    Bradycardia 12/29/2018    History of atrial fibrillation 03/19/2019    Pacemaker 03/19/2019    Paroxysmal SVT (supraventricular tachycardia) 05/08/2019    History of chest pain 05/08/2019    Palpitations 05/08/2019    Paroxysmal atrial fibrillation 10/06/2019    Ascending aortic aneurysm 08/24/2020    Mediastinal lymphadenopathy 09/09/2020    Anemia     Obesity (BMI 30-39.9)     Dysautonomia 04/09/2021    Hypercalcemia 04/11/2021    Hyperparathyroidism 04/28/2021    Choledocholithiasis 05/09/2021    Duodenal ulcer 05/13/2021    Hemorrhagic gastritis 05/13/2021    Exertional dyspnea 06/22/2021    COPD (chronic obstructive pulmonary disease)     Osteoarthritis of glenohumeral joint 07/12/2018    ICH (intracerebral hemorrhage) 08/05/2021    Lower GI bleed 11/01/2022    Thrombocytopenia 11/01/2022    Lichen sclerosus of female genitalia 03/13/2023    Senile atrophic vaginitis 03/13/2023    Left inguinal hernia 08/23/2023    Vulvar pain 02/01/2024    Bilateral lower extremity edema 04/24/2024    Acute on chronic diastolic congestive heart  failure 04/24/2024     Resolved Ambulatory Problems     Diagnosis Date Noted    Abnormal weight loss 05/12/2016    Tachycardia 05/12/2016    Sciatica 05/12/2016    Nausea and vomiting 05/12/2016    Hyperthyroidism 05/12/2016    Fatigue 05/12/2016    Dizziness 05/12/2016    Diarrhea 05/12/2016    Abnormal thyroid stimulating hormone (TSH) level 05/12/2016    Abdominal pain 05/12/2016    Abnormal EKG 06/30/2016    Leukopenia 09/12/2016    Other chest pain 12/24/2017    Shoulder arthritis 01/28/2019    Rectal bleeding 10/15/2019    Arthritis 12/13/2019    Immobility 11/20/2020    Left knee pain 11/20/2020    Chronic anticoagulation 11/20/2020    Hemarthrosis of left knee 11/20/2020    Generalized weakness 04/08/2021    Acute UTI (urinary tract infection) 04/08/2021    Weakness of both lower extremities 04/09/2021    Spondylosis of lumbosacral region without myelopathy or radiculopathy 04/09/2021    Macrocytosis 04/11/2021    Arthritis of right wrist 04/13/2021    Hypomagnesemia 04/13/2021    Essential hypertension 05/10/2021    Hyperglycemia 05/10/2021    Epigastric pain 05/12/2021    Functional quadriplegia 11/20/2020    Hip pain 04/12/2018    Other malaise and fatigue 05/25/2021    Shoulder pain 04/12/2018    Subdural hematoma 08/05/2021    Melena 09/29/2022    GI bleed 11/01/2022     Past Medical History:   Diagnosis Date    Abdominal aortic aneurysm     Arrhythmia     Asthma     Colitis     GERD (gastroesophageal reflux disease)     Hypertension     Hypertensive heart disease     Inguinal hernia     Kidney stone     Nephrolithiasis     Peptic ulceration     Pressure ulcer     PSVT (paroxysmal supraventricular tachycardia)     Rectal bleed     Sleep apnea     Systemic hypertension     Ventricular tachycardia        The patient qualifies to receive the vaccine, but they have not yet received it.    PAST SURGICAL HISTORY  Past Surgical History:   Procedure Laterality Date    BACK SURGERY      lumbar fusion    PAWAN  HOLE Left 08/08/2021    Procedure: Left-sided dusty holes for evacuation of subdural hematoma;  Surgeon: Gustavo Callaway MD;  Location: [facility] MAIN OR;  Service: Neurosurgery;  Laterality: Left;    CARDIAC ELECTROPHYSIOLOGY PROCEDURE N/A 11/07/2018    Procedure: Pacemaker DC new   [place];  Surgeon: Jesus Granda MD;  Location: [facility] CATH INVASIVE LOCATION;  Service: Cardiology    CHOLECYSTECTOMY      COLONOSCOPY  06/16/2014    colitis, cryptitis,  tics, NBIH, TA w/low grade dysplasia    COLONOSCOPY N/A 10/28/2019    Procedure: COLONOSCOPY WITH COLD AND HOT POLYPECTOMIES;  Surgeon: Micaela English MD;  Location: [facility] ENDOSCOPY;  Service: Gastroenterology    ENDOSCOPY N/A 05/13/2021    Procedure: ESOPHAGOGASTRODUODENOSCOPY with BX;  Surgeon: Stefan Mcfadden MD;  Location: [facility] ENDOSCOPY;  Service: Gastroenterology;  Laterality: N/A;  EPIGASTRIC PAIN  --DUODENAL ULCER, HEMORRHAGIC GASTRITIS, HIATAL HERNIA     ENDOSCOPY N/A 10/11/2022    Procedure: ESOPHAGOGASTRODUODENOSCOPY;  Surgeon: Micaela English MD;  Location: [facility] ENDOSCOPY;  Service: Gastroenterology;  Laterality: N/A;  PRE- MELENA  POST- ESOPHAGITIS    HEMORRHOIDECTOMY      HERNIA REPAIR      umbilical    HYSTERECTOMY      INGUINAL HERNIA REPAIR Left 9/1/2023    Procedure: INGUINAL HERNIA REPAIR LEFT;  Surgeon: Antonio Foster MD;  Location: [facility] OR OSC;  Service: General;  Laterality: Left;    JOINT REPLACEMENT Bilateral     KNEES    MYOMECTOMY      PACEMAKER IMPLANTATION      SHOULDER SURGERY      SIGMOIDOSCOPY N/A 11/02/2022    Procedure: SIGMOIDOSCOPY FLEXIBLE WITH COLD BIOPSIES AND ASPIRATE;  Surgeon: Micaela English MD;  Location: [facility] ENDOSCOPY;  Service: Gastroenterology;  Laterality: N/A;  PRE- RECTAL BLEEDING  POST- HEMORRHOIDS, DIVERTICULOSIS, COLITIS    SINUS SURGERY      TOE NAIL AMPUTATION  03/04/2019    TONSILLECTOMY           FAMILY HISTORY  Family History   Problem Relation Age of Onset    Diabetes Mother      Breast cancer Sister     Kidney cancer Sister     Heart disease Sister     Prostate cancer Brother     Prostate cancer Brother     Prostate cancer Brother     Malig Hyperthermia Neg Hx          SOCIAL HISTORY  Social History     Socioeconomic History    Marital status:     Number of children: 10    Years of education: High School   Tobacco Use    Smoking status: Former     Current packs/day: 0.00     Average packs/day: 1.5 packs/day for 12.0 years (18.0 ttl pk-yrs)     Types: Cigarettes     Start date:      Quit date:      Years since quittin.3     Passive exposure: Past    Smokeless tobacco: Never    Tobacco comments:     QUIT SMOKING    Vaping Use    Vaping status: Never Used   Substance and Sexual Activity    Alcohol use: No     Comment: caffeine - decaf coffee    Drug use: Never    Sexual activity: Defer         ALLERGIES  Codeine, Amitriptyline, Amoxicillin-pot clavulanate, Aspirin, Carisoprodol-aspirin-codeine, Iodinated contrast media, Latex, Naproxen, Nsaids, Soma compound with codeine [carisoprodol-aspirin-codeine], Sulfa antibiotics, and Tramadol        REVIEW OF SYSTEMS  Review of Systems   Respiratory:  Positive for shortness of breath.    Cardiovascular:  Negative for chest pain.   Neurological:  Positive for weakness and light-headedness.        All systems reviewed and negative except for those discussed in HPI.       PHYSICAL EXAM    I have reviewed the triage vital signs and nursing notes.    ED Triage Vitals [24]   Temp Heart Rate Resp BP SpO2   97.9 °F (36.6 °C) 72 21 114/48 98 %      Temp src Heart Rate Source Patient Position BP Location FiO2 (%)   Tympanic Monitor -- Right arm --       Physical Exam  GENERAL: alert, elderly, frail  SKIN: Warm, dry  HENT: Normocephalic, atraumatic  EYES: no scleral icterus  CV: regular rhythm, regular rate  RESPIRATORY: normal effort, lungs clear  ABDOMEN: soft, nontender, nondistended  MUSCULOSKELETAL: no deformity, trace  edema to lower extremities  NEURO: alert, moves all extremities, follows commands          LAB RESULTS  Recent Results (from the past 24 hour(s))   Basic Metabolic Panel    Collection Time: 04/25/24  7:25 AM    Specimen: Blood   Result Value Ref Range    Glucose 100 (H) 65 - 99 mg/dL    BUN 17 8 - 23 mg/dL    Creatinine 1.00 0.57 - 1.00 mg/dL    Sodium 141 136 - 145 mmol/L    Potassium 3.7 3.5 - 5.2 mmol/L    Chloride 103 98 - 107 mmol/L    CO2 31.4 (H) 22.0 - 29.0 mmol/L    Calcium 10.3 8.6 - 10.5 mg/dL    BUN/Creatinine Ratio 17.0 7.0 - 25.0    Anion Gap 6.6 5.0 - 15.0 mmol/L    eGFR 54.0 (L) >60.0 mL/min/1.73   Uric Acid    Collection Time: 04/25/24  7:25 AM    Specimen: Blood   Result Value Ref Range    Uric Acid 7.5 (H) 2.4 - 5.7 mg/dL   TSH Rfx On Abnormal To Free T4    Collection Time: 04/25/24  7:25 AM    Specimen: Blood   Result Value Ref Range    TSH 0.817 0.270 - 4.200 uIU/mL   CBC (No Diff)    Collection Time: 04/25/24  8:33 AM    Specimen: Blood   Result Value Ref Range    WBC 3.31 (L) 3.40 - 10.80 10*3/mm3    RBC 3.75 (L) 3.77 - 5.28 10*6/mm3    Hemoglobin 12.8 12.0 - 15.9 g/dL    Hematocrit 38.9 34.0 - 46.6 %    .7 (H) 79.0 - 97.0 fL    MCH 34.1 (H) 26.6 - 33.0 pg    MCHC 32.9 31.5 - 35.7 g/dL    RDW 13.2 12.3 - 15.4 %    RDW-SD 51.0 37.0 - 54.0 fl    MPV 8.9 6.0 - 12.0 fL    Platelets 129 (L) 140 - 450 10*3/mm3   ECG 12 Lead Dyspnea    Collection Time: 04/25/24  8:03 PM   Result Value Ref Range    QT Interval 452 ms    QTC Interval 456 ms   Comprehensive Metabolic Panel    Collection Time: 04/25/24  8:54 PM    Specimen: Blood   Result Value Ref Range    Glucose 147 (H) 65 - 99 mg/dL    BUN 21 8 - 23 mg/dL    Creatinine 1.23 (H) 0.57 - 1.00 mg/dL    Sodium 139 136 - 145 mmol/L    Potassium 3.1 (L) 3.5 - 5.2 mmol/L    Chloride 102 98 - 107 mmol/L    CO2 25.3 22.0 - 29.0 mmol/L    Calcium 10.1 8.6 - 10.5 mg/dL    Total Protein 6.6 6.0 - 8.5 g/dL    Albumin 3.5 3.5 - 5.2 g/dL    ALT (SGPT) 7 1 -  33 U/L    AST (SGOT) 16 1 - 32 U/L    Alkaline Phosphatase 60 39 - 117 U/L    Total Bilirubin 1.9 (H) 0.0 - 1.2 mg/dL    Globulin 3.1 gm/dL    A/G Ratio 1.1 g/dL    BUN/Creatinine Ratio 17.1 7.0 - 25.0    Anion Gap 11.7 5.0 - 15.0 mmol/L    eGFR 42.1 (L) >60.0 mL/min/1.73   High Sensitivity Troponin T    Collection Time: 04/25/24  8:54 PM    Specimen: Blood   Result Value Ref Range    HS Troponin T 31 (H) <14 ng/L   BNP    Collection Time: 04/25/24  8:54 PM    Specimen: Blood   Result Value Ref Range    proBNP 535.0 0.0 - 1,800.0 pg/mL   Magnesium    Collection Time: 04/25/24  8:54 PM    Specimen: Blood   Result Value Ref Range    Magnesium 1.5 (L) 1.6 - 2.4 mg/dL   CBC Auto Differential    Collection Time: 04/25/24  8:54 PM    Specimen: Blood   Result Value Ref Range    WBC 3.85 3.40 - 10.80 10*3/mm3    RBC 3.95 3.77 - 5.28 10*6/mm3    Hemoglobin 14.0 12.0 - 15.9 g/dL    Hematocrit 41.8 34.0 - 46.6 %    .8 (H) 79.0 - 97.0 fL    MCH 35.4 (H) 26.6 - 33.0 pg    MCHC 33.5 31.5 - 35.7 g/dL    RDW 13.1 12.3 - 15.4 %    RDW-SD 51.9 37.0 - 54.0 fl    MPV 9.1 6.0 - 12.0 fL    Platelets 115 (L) 140 - 450 10*3/mm3    nRBC 0.0 0.0 - 0.2 /100 WBC   Manual Differential    Collection Time: 04/25/24  8:54 PM    Specimen: Blood   Result Value Ref Range    Neutrophil % 63.3 42.7 - 76.0 %    Lymphocyte % 29.6 19.6 - 45.3 %    Monocyte % 7.1 5.0 - 12.0 %    Neutrophils Absolute 2.44 1.70 - 7.00 10*3/mm3    Lymphocytes Absolute 1.14 0.70 - 3.10 10*3/mm3    Monocytes Absolute 0.27 0.10 - 0.90 10*3/mm3    Anisocytosis Mod/2+ None Seen    Macrocytes Mod/2+ None Seen    WBC Morphology Normal Normal    Platelet Morphology Normal Normal   ECG 12 Lead Altered Mental Status    Collection Time: 04/25/24 10:07 PM   Result Value Ref Range    QT Interval 465 ms    QTC Interval 465 ms       Ordered the above labs and independently reviewed and interpreted the results.        RADIOLOGY  XR Chest 1 View    Result Date: 4/25/2024  Emergency  portable view of the chest on 4/25/2024  CLINICAL HISTORY: Weakness and dyspnea  This is correlated to a prior PA and lateral chest x-ray on 4/15/2023.  FINDINGS: There is a left subclavian transvenous pacemaker in place with its distal leads projecting over the right atrium and right ventricular apex. The cardiomediastinal silhouette is mildly enlarged. The pulmonary vasculature is within normal limits. There is stable moderate elevation of the right hemidiaphragm. The lungs are clear. The costophrenic angles are sharp.      1. No convincing active disease is seen in the chest.  2. 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  This report was finalized on 4/25/2024 9:53 PM by Dr. Nate Metcalf M.D on Workstation: [id number]       I ordered the above noted radiological studies. Independently reviewed and interpreted by me.  See dictation for official radiology interpretation.      PROCEDURES    Procedures      MEDICATIONS GIVEN IN ER    Medications   Potassium Replacement - Follow Nurse / BPA Driven Protocol (has no administration in time range)   Magnesium Standard Dose Replacement - Follow Nurse / BPA Driven Protocol (has no administration in time range)   magnesium sulfate 2g/50 mL (PREMIX) infusion (2 g Intravenous New Bag 4/25/24 2307)   potassium chloride (K-DUR,KLOR-CON) ER tablet 40 mEq (40 mEq Oral Given 4/25/24 2150)   sodium chloride 0.9 % bolus 250 mL (250 mL Intravenous New Bag 4/25/24 2145)         PROGRESS, DATA ANALYSIS, CONSULTS, AND MEDICAL DECISION MAKING    All labs have been independently reviewed and interpreted by me.  All radiology studies have been independently reviewed and interpreted by me and discussed with radiologist dictating the report.   EKG's independently reviewed and interpreted by me.  Discussion below represents  my analysis of pertinent findings related to patient's condition, differential diagnosis, treatment plan and final disposition.    My differential diagnosis for dyspnea includes but is not limited to:    Asthma, COPD, pneumonia, pulmonary embolism, acute respiratory distress syndrome, pneumothorax, hemothorax, pleural effusion, pulmonary fibrosis, congestive heart failure, myocardial infarction, DKA, uremia, acidosis, sepsis, anemia, drug related, hyperventilation, CNS disease, inhalation exposure, airway obstruction, aspiration, electrolyte abnormalities, myasthenia gravis, panic attack, anaphylaxis    My differential diagnosis for generalized weakness includes but is not limited to:    Neuromuscular weakness, CVA, Hemorrhagic stroke, Multiple sclerosis, Spinal cord disease:Infection (Epidural abscess), Infarction/ischemia, Trauma (Spinal Cord Syndromes), Inflammation (Transverse Myelitis), Degenerative (Spinal muscular atrophy), Tumor, Peripheral nerve disease: Guillain-Manchester syndrome, Toxins (Ciguatera), Diabetic peripheral neuropathy, Organophosphate toxicity, Lambert-Eaton myasthenic syndrome, Rhabdomyolysis, Dermatomyositis, Polymyositis, Alcoholic myopathy, Non-neuromuscular weakness, ACS, Arrhythmia/Syncope, Severe infection/Sepsis, Hypoglycemia, Periodic paralysis (electrolyte disturbance, K, Mg, Ca), Thyrotoxic periodic paralysis, Respiratory failure, Symptomatic Anemia, Severe dehydration, Hypothyroidism, Polypharmacy, Malignancy          ED Course as of 04/25/24 2334   Thu Apr 25, 2024 2111 WBC: 3.85 [DC]   2111 Hemoglobin: 14.0 [DC]   2132 Creatinine(!): 1.23 [DC]   2141 Called to room by one of patient's daughters. Pt staring off and briefly unresponsive before returning to baseline. Does not appear post-ictal. Denies chest pain. Does report feeling lightheaded. Pt is hypotensive. Will give small fluid bolus, repeat EKG and plan for readmission. [DC]   2154 Discussed case with Dr. Cee [facility], who  will admit the patient to telemetry. [DC]   2211 Initial EKG independently interpreted by myself.  Time 8:03 PM.  Atrial fibrillation.  Heart rate 61.  IVCD. [TD]   2212 Repeat EKG independently interpreted by myself.  Time 10:07 PM.  Junctional rhythm.  Heart rate 60.  Left bundle branch block.  Negative Sgarbossa criteria. [TD]      ED Course User Index  [DC] Karen Rosario PA  [TD] Jimy Caballero II, MD             AS OF 23:34 EDT VITALS:    BP - (!) 87/49  HR - 62  TEMP - 97.7 °F (36.5 °C) (Oral)  O2 SATS - 97%        DIAGNOSIS  Final diagnoses:   Acute renal insufficiency   Hypomagnesemia   Hypokalemia   Dyspnea on exertion   Generalized weakness   Hypotension, unspecified hypotension type         DISPOSITION  ED Disposition       ED Disposition   Decision to Admit    Condition   --    Comment   Level of Care: Telemetry [5]   Diagnosis: Acute renal insufficiency [834216]   Admitting Physician: SYEDA GRISSOM [[id number]]   Attending Physician: SYEDA GRISSOM [[id number]]                    Note Disclaimer: At [facility], we believe that sharing information builds trust and better relationships. You are receiving this note because you recently visited [facility]. It is possible you will see health information before a provider has talked with you about it. This kind of information can be easy to misunderstand. To help you fully understand what it means for your health, we urge you to discuss this note with your provider.         Karne Rosario PA  04/25/24 8816

## 2024-04-26 NOTE — H&P
Patient Name:  Brittany Villeda  YOB: 1935  MRN:  [id number]  Admit Date:  4/25/2024  Patient Care Team:  Mega Esparza MD as PCP - General (Family Medicine)  Micaela English MD as Consulting Physician (Gastroenterology)  Mary Grace Culp MD as Consulting Physician (Cardiology)  Jp Krause Jr., MD as Consulting Physician (Hematology and Oncology)  Yasmeen Pichardo [facility] as Pharmacist  Micaela English MD as Referring Physician (Gastroenterology)  Devon Valadez MD as Consulting Physician (Hematology and Oncology)  Rusty Interiano, JamarD as Pharmacist (Pharmacy)  Catie Jules PA as Physician Assistant (Gastroenterology)  Antonio Foster MD as Surgeon (General Surgery)  Valeria Darling APRN as Nurse Practitioner (Obstetrics and Gynecology)      Subjective   History Present Illness     Chief Complaint   Patient presents with    Shortness of Breath     History of Present Illness  Ms. Villeda is a 89 y.o. non-smoker with a history of chronic diastolic CHF, COPD, HTN, HUGO, paroxysmal atrial fibrillation, pulmonary hypertension, thrombocytopenia  that presents to [facility] complaining of shortness of breath and weakness.  Patient was recently discharged from our service yesterday secondary to acute on chronic CHF.  Daughter at bedside reports that she became short of breath and had difficulty ambulating when she arrived home.  Her family member reported that she had an episode where her lips and fingertips were purple so they called EMS.  Upon EMS arrival, patient was 98% on room air with a respiratory rate of 22.  Upon presentation to the ED, her potassium was 3.1, creat 1.23.  We were asked to admit for observation secondary to acute kidney injury.    Review of Systems   Constitutional:  Negative for chills and fever.   HENT:  Negative for congestion and rhinorrhea.    Eyes:  Negative for photophobia and visual disturbance.   Respiratory:  Negative for cough and  shortness of breath.    Cardiovascular:  Negative for chest pain and palpitations.   Gastrointestinal:  Negative for constipation, diarrhea, nausea and vomiting.   Endocrine: Negative for cold intolerance and heat intolerance.   Genitourinary:  Negative for difficulty urinating and dysuria.   Musculoskeletal:  Negative for gait problem and joint swelling.   Skin:  Negative for rash and wound.   Neurological:  Negative for dizziness, light-headedness and headaches.   Psychiatric/Behavioral:  Negative for sleep disturbance and suicidal ideas.         Personal History     Past Medical History:   Diagnosis Date    Abdominal aortic aneurysm     Anemia     Arrhythmia     Arthritis     Asthma     Bradycardia     Choledocholithiasis 05/09/2021    Colitis     Diastolic dysfunction     Essential hypertension 05/12/2016    GERD (gastroesophageal reflux disease)     Heart block     Hypertension     Hypertensive heart disease     Hyperthyroidism     REPORTS ENLARGED    Inguinal hernia     Kidney stone     Leukopenia     MGUS (monoclonal gammopathy of unknown significance)     Nephrolithiasis     Pacemaker     Paroxysmal atrial fibrillation     Peptic ulceration     Pressure ulcer     REPORTS ON COCCYX. INSTR TO NOTIFY DR BAIRES'S OFFICE    PSVT (paroxysmal supraventricular tachycardia)     Pulmonary hypertension     Rectal bleed     Sleep apnea     NO DEVICE    Subdural hematoma     Systemic hypertension     Trifascicular block     Ulcerative rectosigmoiditis without complication     Ventricular tachycardia     nonsustained     Past Surgical History:   Procedure Laterality Date    BACK SURGERY      lumbar fusion    PAWAN HOLE Left 08/08/2021    Procedure: Left-sided pawan holes for evacuation of subdural hematoma;  Surgeon: Gustavo Callaway MD;  Location: [facility];  Service: Neurosurgery;  Laterality: Left;    CARDIAC ELECTROPHYSIOLOGY PROCEDURE N/A 11/07/2018    Procedure: Pacemaker DC new   [place];  Surgeon: Jesus Alberto  Jesus Aguilera MD;  Location: [facility] CATH INVASIVE LOCATION;  Service: Cardiology    CHOLECYSTECTOMY      COLONOSCOPY  06/16/2014    colitis, cryptitis,  tics, NBIH, TA w/low grade dysplasia    COLONOSCOPY N/A 10/28/2019    Procedure: COLONOSCOPY WITH COLD AND HOT POLYPECTOMIES;  Surgeon: Micaela English MD;  Location: [facility] ENDOSCOPY;  Service: Gastroenterology    ENDOSCOPY N/A 05/13/2021    Procedure: ESOPHAGOGASTRODUODENOSCOPY with BX;  Surgeon: Stefan Mcfadden MD;  Location: [facility] ENDOSCOPY;  Service: Gastroenterology;  Laterality: N/A;  EPIGASTRIC PAIN  --DUODENAL ULCER, HEMORRHAGIC GASTRITIS, HIATAL HERNIA     ENDOSCOPY N/A 10/11/2022    Procedure: ESOPHAGOGASTRODUODENOSCOPY;  Surgeon: Micaela English MD;  Location: [facility] ENDOSCOPY;  Service: Gastroenterology;  Laterality: N/A;  PRE- MELENA  POST- ESOPHAGITIS    HEMORRHOIDECTOMY      HERNIA REPAIR      umbilical    HYSTERECTOMY      INGUINAL HERNIA REPAIR Left 9/1/2023    Procedure: INGUINAL HERNIA REPAIR LEFT;  Surgeon: Antonio Foster MD;  Location: [facility] OR [facility];  Service: General;  Laterality: Left;    JOINT REPLACEMENT Bilateral     KNEES    MYOMECTOMY      PACEMAKER IMPLANTATION      SHOULDER SURGERY      SIGMOIDOSCOPY N/A 11/02/2022    Procedure: SIGMOIDOSCOPY FLEXIBLE WITH COLD BIOPSIES AND ASPIRATE;  Surgeon: Micaela English MD;  Location: [facility] ENDOSCOPY;  Service: Gastroenterology;  Laterality: N/A;  PRE- RECTAL BLEEDING  POST- HEMORRHOIDS, DIVERTICULOSIS, COLITIS    SINUS SURGERY      TOE NAIL AMPUTATION  03/04/2019    TONSILLECTOMY       Family History   Problem Relation Age of Onset    Diabetes Mother     Breast cancer Sister     Kidney cancer Sister     Heart disease Sister     Prostate cancer Brother     Prostate cancer Brother     Prostate cancer Brother     Malig Hyperthermia Neg Hx      Social History     Tobacco Use    Smoking status: Former     Current packs/day: 0.00     Average packs/day: 1.5 packs/day for 12.0 years  (18.0 ttl pk-yrs)     Types: Cigarettes     Start date:      Quit date:      Years since quittin.3     Passive exposure: Past    Smokeless tobacco: Never    Tobacco comments:     QUIT SMOKING    Vaping Use    Vaping status: Never Used   Substance Use Topics    Alcohol use: No     Comment: caffeine - decaf coffee    Drug use: Never     Current Facility-Administered Medications on File Prior to Encounter   Medication Dose Route Frequency Provider Last Rate Last Admin    [DISCONTINUED] acetaminophen (TYLENOL) tablet 500 mg  500 mg Oral Q6H PRN Melissa Duarte APRN        [DISCONTINUED] clonazePAM (KlonoPIN) tablet 0.5 mg  0.5 mg Oral BID PRN Melissa Duarte APRN        [DISCONTINUED] furosemide (LASIX) tablet 40 mg  40 mg Oral Daily Kevin Leonard MD   40 mg at 24 1007    [DISCONTINUED] magnesium oxide (MAG-OX) tablet 400 mg  1 tablet Oral Daily Melissa Duarte APRN   400 mg at 24 1007    [DISCONTINUED] mesalamine (APRISO) 24 hr capsule 1.5 g  1.5 g Oral Daily Melissa Duarte APRN   1.5 g at 24 1008    [DISCONTINUED] nitroglycerin (NITROSTAT) SL tablet 0.4 mg  0.4 mg Sublingual Q5 Min PRN Kevin Leonard MD        [DISCONTINUED] ondansetron (ZOFRAN) injection 4 mg  4 mg Intravenous Q6H PRN Kevin Leoanrd MD        [DISCONTINUED] pantoprazole (PROTONIX) EC tablet 40 mg  40 mg Oral Daily Melissa Duarte APRN   40 mg at 24 1007    [DISCONTINUED] Potassium Replacement - Follow Nurse / BPA Driven Protocol   Does not apply PRN Melissa Duarte APRN        [DISCONTINUED] sodium chloride 0.9 % flush 10 mL  10 mL Intravenous Q12H Melissa Duarte APRN   10 mL at 24 0910    [DISCONTINUED] sodium chloride 0.9 % flush 10 mL  10 mL Intravenous PRN Melissa Duarte APRN        [DISCONTINUED] sodium chloride 0.9 % infusion 40 mL  40 mL Intravenous PRN Melissa Duarte APRN        [DISCONTINUED] valACYclovir (VALTREX) tablet 1,000 mg  1,000 mg Oral Q24H Melissa Duarte APRN   1,000  mg at 04/25/24 1007     Current Outpatient Medications on File Prior to Encounter   Medication Sig Dispense Refill    acetaminophen (TYLENOL) 500 MG tablet Take 1 tablet by mouth Every 6 (Six) Hours As Needed for Mild Pain .      clobetasol (TEMOVATE) 0.05 % ointment Apply 1 Application topically to the appropriate area as directed 2 (Two) Times a Day. 60 g 1    clonazePAM (KlonoPIN) 0.5 MG tablet Take 1 tablet by mouth 2 (Two) Times a Day As Needed for Anxiety (Grief related anxiety). 10 tablet 0    furosemide (LASIX) 40 MG tablet Take 1 tablet by mouth Daily. 30 tablet 0    lidocaine (XYLOCAINE) 5 % ointment Apply 1 Application topically to the appropriate area as directed Daily.      Magnesium Oxide -Mg Supplement 400 (240 Mg) MG tablet Take 1 tablet by mouth Daily. 90 tablet 1    mesalamine (APRISO) 0.375 g 24 hr capsule Take 4 capsules by mouth Daily. 360 capsule 0    nystatin (MYCOSTATIN) 457447 UNIT/GM ointment Apply 1 Application topically to the appropriate area as directed As Needed.      pantoprazole (PROTONIX) 40 MG EC tablet Take 1 tablet by mouth Daily. 90 tablet 1    potassium chloride (KLOR-CON M20) 20 MEQ CR tablet Take 1 tablet by mouth Daily. 30 tablet 0    tacrolimus (PROTOPIC) 0.1 % ointment Apply 1 Application topically to the appropriate area as directed 2 (Two) Times a Day.      valACYclovir (VALTREX) 1000 MG tablet Take 1 tablet by mouth Daily.      vitamin B-12 (CYANOCOBALAMIN) 1000 MCG tablet Take 1 tablet by mouth Daily. 90 tablet 1     Allergies   Allergen Reactions    Codeine Hallucinations     Tolerates hydromorphone    Amitriptyline Rash    Amoxicillin-Pot Clavulanate Rash    Aspirin Unknown - Low Severity     Patient doesn't know why    Carisoprodol-Aspirin-Codeine Palpitations    Iodinated Contrast Media Rash    Latex Rash    Naproxen Rash    Nsaids Unknown - Low Severity     UNSURE OF REACTION    Soma Compound With Codeine [Carisoprodol-Aspirin-Codeine] Rash    Sulfa Antibiotics  Rash    Tramadol Palpitations     heart races        Objective    Objective     Vital Signs  Temp:  [97.5 °F (36.4 °C)-97.9 °F (36.6 °C)] 97.7 °F (36.5 °C)  Heart Rate:  [53-72] 62  Resp:  [16-21] 16  BP: ()/(48-66) 87/49  SpO2:  [94 %-99 %] 97 %  on   ;   Device (Oxygen Therapy): room air  Body mass index is 29.52 kg/m².    Physical Exam  Vitals and nursing note reviewed.   Constitutional:       General: She is not in acute distress.     Appearance: She is well-developed. She is not toxic-appearing.   HENT:      Head: Normocephalic and atraumatic.   Eyes:      General: No scleral icterus.        Right eye: No discharge.         Left eye: No discharge.      Conjunctiva/sclera: Conjunctivae normal.   Neck:      Vascular: No JVD.   Cardiovascular:      Rate and Rhythm: Normal rate and regular rhythm.      Heart sounds: Normal heart sounds. No murmur heard.     No friction rub. No gallop.      Comments: Trace edema  Pulmonary:      Effort: Pulmonary effort is normal. No respiratory distress.      Breath sounds: Normal breath sounds. No wheezing or rales.   Abdominal:      General: Bowel sounds are normal. There is no distension.      Palpations: Abdomen is soft.      Tenderness: There is no abdominal tenderness. There is no guarding.   Musculoskeletal:         General: No tenderness or deformity. Normal range of motion.      Cervical back: Normal range of motion and neck supple.   Skin:     General: Skin is warm and dry.      Capillary Refill: Capillary refill takes less than 2 seconds.   Neurological:      Mental Status: She is alert and oriented to person, place, and time.   Psychiatric:         Behavior: Behavior normal.     Results Review:  I reviewed the patient's new clinical results.  I reviewed the patient's new imaging results and agree with the interpretation.  I reviewed the patient's other test results and agree with the interpretation  I personally viewed and interpreted the patient's EKG/Telemetry  data  Discussed with ED provider.    Lab Results (last 24 hours)       Procedure Component Value Units Date/Time    Basic Metabolic Panel [[id number]]  (Abnormal) Collected: 04/25/24 0725    Specimen: Blood Updated: 04/25/24 0819     Glucose 100 mg/dL      BUN 17 mg/dL      Creatinine 1.00 mg/dL      Sodium 141 mmol/L      Potassium 3.7 mmol/L      Chloride 103 mmol/L      CO2 31.4 mmol/L      Calcium 10.3 mg/dL      BUN/Creatinine Ratio 17.0     Anion Gap 6.6 mmol/L      eGFR 54.0 mL/min/1.73     Narrative:      GFR Normal >60  Chronic Kidney Disease <60  Kidney Failure <15    The GFR formula is only valid for adults with stable renal function between ages 18 and 70.    Uric Acid [[id number]]  (Abnormal) Collected: 04/25/24 0725    Specimen: Blood Updated: 04/25/24 0819     Uric Acid 7.5 mg/dL     TSH Rfx On Abnormal To Free T4 [[id number]]  (Normal) Collected: 04/25/24 0725    Specimen: Blood Updated: 04/25/24 0823     TSH 0.817 uIU/mL     CBC (No Diff) [[id number]]  (Abnormal) Collected: 04/25/24 0833    Specimen: Blood Updated: 04/25/24 0836     WBC 3.31 10*3/mm3      RBC 3.75 10*6/mm3      Hemoglobin 12.8 g/dL      Hematocrit 38.9 %      .7 fL      MCH 34.1 pg      MCHC 32.9 g/dL      RDW 13.2 %      RDW-SD 51.0 fl      MPV 8.9 fL      Platelets 129 10*3/mm3     CBC & Differential [[id number]]  (Abnormal) Collected: 04/25/24 2054    Specimen: Blood Updated: 04/25/24 2105    Narrative:      The following orders were created for panel order CBC & Differential.  Procedure                               Abnormality         Status                     ---------                               -----------         ------                     CBC Auto Differential[[id number]]        Abnormal            Final result                 Please view results for these tests on the individual orders.    Comprehensive Metabolic Panel [[id number]]  (Abnormal) Collected: 04/25/24 2054    Specimen: Blood Updated: 04/25/24 2127      Glucose 147 mg/dL      BUN 21 mg/dL      Creatinine 1.23 mg/dL      Sodium 139 mmol/L      Potassium 3.1 mmol/L      Chloride 102 mmol/L      CO2 25.3 mmol/L      Calcium 10.1 mg/dL      Total Protein 6.6 g/dL      Albumin 3.5 g/dL      ALT (SGPT) 7 U/L      AST (SGOT) 16 U/L      Alkaline Phosphatase 60 U/L      Total Bilirubin 1.9 mg/dL      Globulin 3.1 gm/dL      A/G Ratio 1.1 g/dL      BUN/Creatinine Ratio 17.1     Anion Gap 11.7 mmol/L      eGFR 42.1 mL/min/1.73     Narrative:      GFR Normal >60  Chronic Kidney Disease <60  Kidney Failure <15    The GFR formula is only valid for adults with stable renal function between ages 18 and 70.    High Sensitivity Troponin T [[id number]]  (Abnormal) Collected: 04/25/24 2054    Specimen: Blood Updated: 04/25/24 2124     HS Troponin T 31 ng/L     Narrative:      High Sensitive Troponin T Reference Range:  <14.0 ng/L- Negative Female for AMI  <22.0 ng/L- Negative Male for AMI  >=14 - Abnormal Female indicating possible myocardial injury.  >=22 - Abnormal Male indicating possible myocardial injury.   Clinicians would have to utilize clinical acumen, EKG, Troponin, and serial changes to determine if it is an Acute Myocardial Infarction or myocardial injury due to an underlying chronic condition.         BNP [[id number]]  (Normal) Collected: 04/25/24 2054    Specimen: Blood Updated: 04/25/24 2124     proBNP 535.0 pg/mL     Narrative:      This assay is used as an aid in the diagnosis of individuals suspected of having heart failure. It can be used as an aid in the diagnosis of acute decompensated heart failure (ADHF) in patients presenting with signs and symptoms of ADHF to the emergency department (ED). In addition, NT-proBNP of <300 pg/mL indicates ADHF is not likely.    Age Range Result Interpretation  NT-proBNP Concentration (pg/mL:      <50             Positive            >450                   Gray                 300-450                    Negative              <300    50-75           Positive            >900                  Gray                300-900                  Negative            <300      >75             Positive            >1800                  Gray                300-1800                  Negative            <300    Magnesium [[id number]]  (Abnormal) Collected: 04/25/24 2054    Specimen: Blood Updated: 04/25/24 2127     Magnesium 1.5 mg/dL     CBC Auto Differential [[id number]]  (Abnormal) Collected: 04/25/24 2054    Specimen: Blood Updated: 04/25/24 2105     WBC 3.85 10*3/mm3      RBC 3.95 10*6/mm3      Hemoglobin 14.0 g/dL      Hematocrit 41.8 %      .8 fL      MCH 35.4 pg      MCHC 33.5 g/dL      RDW 13.1 %      RDW-SD 51.9 fl      MPV 9.1 fL      Platelets 115 10*3/mm3      nRBC 0.0 /100 WBC     Manual Differential [[id number]] Collected: 04/25/24 2054    Specimen: Blood Updated: 04/25/24 2135     Neutrophil % 63.3 %      Lymphocyte % 29.6 %      Monocyte % 7.1 %      Neutrophils Absolute 2.44 10*3/mm3      Lymphocytes Absolute 1.14 10*3/mm3      Monocytes Absolute 0.27 10*3/mm3      Anisocytosis Mod/2+     Macrocytes Mod/2+     WBC Morphology Normal     Platelet Morphology Normal            Imaging Results (Last 24 Hours)       Procedure Component Value Units Date/Time    XR Chest 1 View [[id number]] Collected: 04/25/24 2151     Updated: 04/25/24 2156    Narrative:      Emergency portable view of the chest on 4/25/2024     CLINICAL HISTORY: Weakness and dyspnea     This is correlated to a prior PA and lateral chest x-ray on 4/15/2023.     FINDINGS: There is a left subclavian transvenous pacemaker in place with  its distal leads projecting over the right atrium and right ventricular  apex. The cardiomediastinal silhouette is mildly enlarged. The pulmonary  vasculature is within normal limits. There is stable moderate elevation  of the right hemidiaphragm. The lungs are clear. The costophrenic angles  are sharp.        Impression:      1. No convincing  active disease is seen in the chest.     2. There is a left subclavian transvenous pacemaker in place and there  is mild cardiomegaly and there is chronic moderate elevation the right  hemidiaphragm with horizontal linear atelectasis at the right lung base.     This report was finalized on 4/25/2024 9:53 PM by Dr. Nate Metcalf M.D  on Workstation: [id number]               Results for orders placed during the hospital encounter of 04/23/24    Adult Transthoracic Echo Complete W/ Cont if Necessary Per Protocol    Interpretation Summary    Left ventricular systolic function is normal. Left ventricular ejection fraction appears to be 61 - 65%.    Left ventricular diastolic function was indeterminate.    The right ventricular cavity is mildly dilated. Normal right ventricular systolic function noted.    The left atrial cavity is severely dilated.    The right atrial cavity is severely dilated.    Saline test results are negative.    Mild aortic valve regurgitation is present.    Mild mitral valve regurgitation is present.    Mild to moderate tricuspid valve regurgitation is present.    Calculated right ventricular systolic pressure from tricuspid regurgitation is 38 mmHg.    There is no evidence of pericardial effusion.      ECG 12 Lead Altered Mental Status   Preliminary Result   HEART RATE= 60  bpm   RR Interval= 1000  ms   AK Interval=   ms   P Horizontal Axis=   deg   P Front Axis=   deg   QRSD Interval= 169  ms   QT Interval= 465  ms   QTcB= 465  ms   QRS Axis= -64  deg   T Wave Axis= 105  deg   - ABNORMAL ECG -   Junctional rhythm   Nonspecific IVCD with LAD   LVH with secondary repolarization abnormality   Inferior infarct, acute (RCA)   Anterolateral infarct, old   RV involvement, suggest recording right precordial leads   Electronically Signed By:    Date and Time of Study: 2024-04-25 22:07:45      ECG 12 Lead Dyspnea   Preliminary Result   HEART RATE= 61  bpm   RR Interval= 984  ms   AK Interval=   ms   P  Horizontal Axis=   deg   P Front Axis=   deg   QRSD Interval= 142  ms   QT Interval= 452  ms   QTcB= 456  ms   QRS Axis= -46  deg   T Wave Axis= -60  deg   - ABNORMAL ECG -   Afib/flut and V-paced complexes   No further rhythm analysis attempted due to paced rhythm   Right bundle branch block   LVH with secondary repolarization abnormality   Electronically Signed By:    Date and Time of Study: 2024-04-25 20:03:02           Assessment/Plan     Active Hospital Problems    Diagnosis  POA    **RACH (acute kidney injury) [N17.9]  Yes    Hypokalemia [E87.6]  Yes    Acute on chronic diastolic congestive heart failure [I50.33]  Yes    Thrombocytopenia [D69.6]  Yes    COPD (chronic obstructive pulmonary disease) [J44.9]  Yes    Paroxysmal atrial fibrillation [I48.0]  Yes    Pulmonary hypertension [I27.20]  Yes    HUGO (obstructive sleep apnea) [G47.33]  Yes    Essential hypertension [I10]  Yes      Resolved Hospital Problems   No resolved problems to display.       Ms. Villeda is a 89 y.o. former smoker with a history of  chronic diastolic CHF, COPD, HTN, HUGO, paroxysmal atrial fibrillation, pulmonary hypertension, thrombocytopenia who presents with dyspnea was found to have acute kidney injury.    RACH   -Creat mildly elevated at 1.23.  It looks like when she was discharged her creat was 1 which is around her baseline.  Will give a small bolus tonight.  -Avoid nephrotoxins  -Repeat BMP in a.m.  -Family at bedside reports weakness at home during ambulation.  Will have PT to treat and eval    Chronic diastolic congestive heart failure  Pulmonary hypertension  -Compensated on exam.    -Daily weights  -Strict I's and O  -Resume home regimen    Paroxysmal atrial fibrillation  -Rate is currently controlled    COPD  -Compensating, resume home nebulizer     Essential hypertension  -Pressures were soft upon presentation, will hold her antihypertensives    Thrombocytopenia  -Platelets today 115.  It appears when she was discharged  over 129.  Repeat CBC in a.m.    Hypokalemia  -Replace      I discussed the patient's findings and my recommendations with patient and family.    VTE Prophylaxis - SCDs.  Code Status - Full code.       SHAHID Chiu  [facility]  04/26/24  01:39 EDT

## 2024-04-26 NOTE — DISCHARGE PLACEMENT REQUEST
"Paloma Villeda (89 y.o. Female)       Date of Birth   1935    Social Security Number       Address   [address] [address]    Home Phone   [phone]    MRN   [id number]       [facility]    Marital Status                               Admission Date   4/25/24    Admission Type   Emergency    Admitting Provider   Ash Marlow MD    Attending Provider   Ash Marlow MD    Department, Room/Bed   [address], [address]       Discharge Date       Discharge Disposition       Discharge Destination                                 Attending Provider: Ash Marlow MD    Allergies: Codeine, Amitriptyline, Amoxicillin-pot Clavulanate, Aspirin, Carisoprodol-aspirin-codeine, Iodinated Contrast Media, Latex, Naproxen, Nsaids, Soma Compound With Codeine [Carisoprodol-aspirin-codeine], Sulfa Antibiotics, Tramadol    Isolation: None   Infection: None   Code Status: CPR    Ht: 162.6 cm (64\")   Wt: 78 kg (172 lb)    Admission Cmt: None   Principal Problem: RACH (acute kidney injury) [N17.9]                   Active Insurance as of 4/25/2024       Primary Coverage       Payor Plan Insurance Group Employer/Plan Group    ANTHEM MEDICARE REPLACEMENT ANTHEM MEDICARE ADVANTAGE [id number]       Payor Plan Address Payor Plan Phone Number Payor Plan Fax Number Effective Dates    [address] [phone]  1/1/2024 - None Entered    [facility] [id number]         Subscriber Name Subscriber Birth Date Member ID       PALOMA VILLEDA 1935 [id number]               Secondary Coverage       Payor Plan Insurance Group Employer/Plan Group    [place]        Payor Plan Address Payor Plan Phone Number Payor Plan Fax Number Effective Dates    [address] [phone]  1/1/2024 - None Entered    [facility] [id number]         Subscriber Name Subscriber Birth Date Member ID       PALOMA VILLEDA 1935 [id number]               "       Emergency Contacts        (Rel.) Home Phone Work Phone Mobile Phone    TONY CASTAÑEDA ([facility]) (Daughter) -- -- [phone]    Narda Cam([facility]) (Daughter) -- -- [phone]    Amina Diana (Daughter) [phone] -- --

## 2024-04-26 NOTE — ED NOTES
"..Nursing report ED to floor  Brittany Villeda  89 y.o.  female    HPI :  HPI (Adult)  Stated Reason for Visit: (S) Pt just DC'd from here today, familly called for SOA and \"blue lips\", upon EMS arrial, pt was pink, warm, and dry NO CYANOSIS PRESENT, O2 was 98% on room air.  RPM are 22.  Pt is in no distress at this time.  History Obtained From: patient, EMS  Precipitating Event(s): none  Duration (Hours): 2    Chief Complaint  Chief Complaint   Patient presents with    Shortness of Breath       Admitting doctor:   Cedric Cee MD    Admitting diagnosis:   The primary encounter diagnosis was Acute renal insufficiency. Diagnoses of Hypomagnesemia, Hypokalemia, Dyspnea on exertion, Generalized weakness, and Hypotension, unspecified hypotension type were also pertinent to this visit.    Code status:   Current Code Status       Date Active Code Status Order ID Comments User Context       Prior            Allergies:   Codeine, Amitriptyline, Amoxicillin-pot clavulanate, Aspirin, Carisoprodol-aspirin-codeine, Iodinated contrast media, Latex, Naproxen, Nsaids, Soma compound with codeine [carisoprodol-aspirin-codeine], Sulfa antibiotics, and Tramadol    Isolation:   No active isolations    Intake and Output  No intake or output data in the 24 hours ending 04/25/24 2200    Weight:       04/25/24 1943   Weight: 78 kg (172 lb)       Most recent vitals:   Vitals:    04/25/24 2008 04/25/24 2054 04/25/24 2132 04/25/24 2137   BP:  100/58 106/66 98/60   BP Location:       Pulse: 60 72 60 61   Resp:  18  16   Temp:       TempSrc:       SpO2: 97% 97% 97% 94%   Weight:       Height:           Active LDAs/IV Access:   Lines, Drains & Airways       Active LDAs       Name Placement date Placement time Site Days    Peripheral IV 04/25/24 2055 Anterior;Left Forearm 04/25/24 2055  Forearm  less than 1    External Urinary Catheter 04/23/24 1943  --  2                    Labs (abnormal labs have a star):   Labs Reviewed "   COMPREHENSIVE METABOLIC PANEL - Abnormal; Notable for the following components:       Result Value    Glucose 147 (*)     Creatinine 1.23 (*)     Potassium 3.1 (*)     Total Bilirubin 1.9 (*)     eGFR 42.1 (*)     All other components within normal limits    Narrative:     GFR Normal >60  Chronic Kidney Disease <60  Kidney Failure <15    The GFR formula is only valid for adults with stable renal function between ages 18 and 70.   TROPONIN - Abnormal; Notable for the following components:    HS Troponin T 31 (*)     All other components within normal limits    Narrative:     High Sensitive Troponin T Reference Range:  <14.0 ng/L- Negative Female for AMI  <22.0 ng/L- Negative Male for AMI  >=14 - Abnormal Female indicating possible myocardial injury.  >=22 - Abnormal Male indicating possible myocardial injury.   Clinicians would have to utilize clinical acumen, EKG, Troponin, and serial changes to determine if it is an Acute Myocardial Infarction or myocardial injury due to an underlying chronic condition.        MAGNESIUM - Abnormal; Notable for the following components:    Magnesium 1.5 (*)     All other components within normal limits   CBC WITH AUTO DIFFERENTIAL - Abnormal; Notable for the following components:    .8 (*)     MCH 35.4 (*)     Platelets 115 (*)     All other components within normal limits   BNP (IN-HOUSE) - Normal    Narrative:     This assay is used as an aid in the diagnosis of individuals suspected of having heart failure. It can be used as an aid in the diagnosis of acute decompensated heart failure (ADHF) in patients presenting with signs and symptoms of ADHF to the emergency department (ED). In addition, NT-proBNP of <300 pg/mL indicates ADHF is not likely.    Age Range Result Interpretation  NT-proBNP Concentration (pg/mL:      <50             Positive            >450                   Gray                 300-450                    Negative             <300    50-75            Positive            >900                  Gray                300-900                  Negative            <300      >75             Positive            >1800                  Gray                300-1800                  Negative            <300   MANUAL DIFFERENTIAL   URINALYSIS W/ MICROSCOPIC IF INDICATED (NO CULTURE)   HIGH SENSITIVITIY TROPONIN T 2HR   CBC AND DIFFERENTIAL    Narrative:     The following orders were created for panel order CBC & Differential.  Procedure                               Abnormality         Status                     ---------                               -----------         ------                     CBC Auto Differential[[id number]]        Abnormal            Final result                 Please view results for these tests on the individual orders.       EKG:   ECG 12 Lead Dyspnea   Preliminary Result   HEART RATE= 61  bpm   RR Interval= 984  ms   MS Interval=   ms   P Horizontal Axis=   deg   P Front Axis=   deg   QRSD Interval= 142  ms   QT Interval= 452  ms   QTcB= 456  ms   QRS Axis= -46  deg   T Wave Axis= -60  deg   - ABNORMAL ECG -   Afib/flut and V-paced complexes   No further rhythm analysis attempted due to paced rhythm   Right bundle branch block   LVH with secondary repolarization abnormality   Electronically Signed By:    Date and Time of Study: 2024-04-25 20:03:02      ECG 12 Lead Altered Mental Status    (Results Pending)       Meds given in ED:   Medications   sodium chloride 0.9 % bolus 250 mL (250 mL Intravenous New Bag 4/25/24 2145)   Potassium Replacement - Follow Nurse / BPA Driven Protocol (has no administration in time range)   Magnesium Standard Dose Replacement - Follow Nurse / BPA Driven Protocol (has no administration in time range)   magnesium sulfate 2g/50 mL (PREMIX) infusion (2 g Intravenous New Bag 4/25/24 2152)   potassium chloride (K-DUR,KLOR-CON) ER tablet 40 mEq (40 mEq Oral Given 4/25/24 2150)       Imaging results:  XR Chest 1 View    Result  Date: 2024  1. No convincing active disease is seen in the chest.  2. 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  This report was finalized on 2024 9:53 PM by Dr. Nate Metcalf M.D on Workstation: [id number]       Ambulatory status:   - ambulatory with assistance    Social issues:   Social History     Socioeconomic History    Marital status:     Number of children: 10    Years of education: High School   Tobacco Use    Smoking status: Former     Current packs/day: 0.00     Average packs/day: 1.5 packs/day for 12.0 years (18.0 ttl pk-yrs)     Types: Cigarettes     Start date:      Quit date:      Years since quittin.3     Passive exposure: Past    Smokeless tobacco: Never    Tobacco comments:     QUIT SMOKING    Vaping Use    Vaping status: Never Used   Substance and Sexual Activity    Alcohol use: No     Comment: caffeine - decaf coffee    Drug use: Never    Sexual activity: Defer       Peripheral Neurovascular  Peripheral Neurovascular (Adult)  Peripheral Neurovascular WDL: .WDL except  Additional Documentation: Edema (Group)  Edema  Leg, Left Edema: 2+ (Mild)  Leg, Right Edema: 2+ (Mild)    Neuro Cognitive  Neuro Cognitive (Adult)  Cognitive/Neuro/Behavioral WDL: .WDL except (patient staring to left side, i shake her arm and she looks at me and says 'hello'.  provider assessing patient, patient answering questions.  face symmetric, speech clear, patient moving both arms symetrically.)    Learning  Learning Assessment (Adult)  Learning Readiness and Ability: no barriers identified    Respiratory  Respiratory (Adult)  Airway WDL: WDL  Respiratory WDL  Respiratory WDL: .WDL except (dyspnea)    Abdominal Pain       Pain Assessments  Pain (Adult)  (0-10) Pain Rating: Rest: 0  (0-10) Pain Rating: Activity: 0    NIH  Stroke Scale       Celso Dean RN  04/25/24 22:00 EDT

## 2024-04-26 NOTE — CASE MANAGEMENT/SOCIAL WORK
Discharge Planning Assessment  [facility]     Patient Name: Brittany Villeda  MRN: [id number]  Today's Date: 4/26/2024    Admit Date: 4/25/2024    Plan: Home with family support, family to transport   Discharge Needs Assessment       Row Name 04/26/24 0944       Living Environment    People in Home child(donaldo), adult    Name(s) of People in Home Genaro Villeda    Current Living Arrangements home    Potentially Unsafe Housing Conditions none    In the past 12 months has the electric, gas, oil, or water company threatened to shut off services in your home? No    Primary Care Provided by self    Provides Primary Care For no one    Family Caregiver if Needed child(donaldo), adult    Family Caregiver Names Genaro Villeda, pt stated that all her children assist in her care    Quality of Family Relationships helpful;involved;supportive    Able to Return to Prior Arrangements yes       Resource/Environmental Concerns    Resource/Environmental Concerns none    Transportation Concerns none       Transportation Needs    In the past 12 months, has lack of transportation kept you from medical appointments or from getting medications? no    In the past 12 months, has lack of transportation kept you from meetings, work, or from getting things needed for daily living? No       Food Insecurity    Within the past 12 months, you worried that your food would run out before you got the money to buy more. Never true    Within the past 12 months, the food you bought just didn't last and you didn't have money to get more. Never true       Transition Planning    Patient/Family Anticipates Transition to home;home with family    Patient/Family Anticipated Services at Transition none    Transportation Anticipated family or friend will provide       Discharge Needs Assessment    Equipment Currently Used at Home cane, straight;walker, rolling;ramp    Concerns to be Addressed no discharge needs identified;denies needs/concerns at this time     Anticipated Changes Related to Illness none    Equipment Needed After Discharge none                   Discharge Plan       Row Name 04/26/24 0945       Plan    Plan Home with family support, family to transport    Patient/Family in Agreement with Plan yes    Provided Post Acute Provider List? N/A    Provided Post Acute Provider Quality & Resource List? N/A    Plan Comments Met with pt at bedside. Permission obtained to speak to pt in front of daughter, Narda. Introduced self and explained role of . Face sheet verified, PCP is [facility] Esparza. Pt denies  any difficulty paying for medications, and she obtains her medications from [facility]. Pt stated that her son, Genaro, lives with her, and could assist with any  care needs that may arise, however, she stated that all her children assist in her care. Pt attempts to perform her own ADL's and attempts to maintain as much independence as possible. Pt uses a cane/walker for mobility. Pt has had [facility] health in the past, and is requesting a referral for home health at this time. Pt has never been to rehab/SNF and stated that she will never go to a SNF. Upon discharge, pt stated that family will transport home. Explained that CCP would follow to assess for discharge needs.                  Continued Care and Services - Admitted Since 4/25/2024    No active coordination exists for this encounter.       Expected Discharge Date and Time       Expected Discharge Date Expected Discharge Time    Apr 28, 2024            Demographic Summary    No documentation.                  Functional Status    No documentation.                  Psychosocial    No documentation.                  Abuse/Neglect    No documentation.                  Legal    No documentation.                  Substance Abuse    No documentation.                  Patient Forms    No documentation.                     Angle Michel RN

## 2024-04-26 NOTE — CASE MANAGEMENT/SOCIAL WORK
Case Management Discharge Note      Final Note: home no needs    Provided Post Acute Provider List?: N/A  Provided Post Acute Provider Quality & Resource List?: N/A    Selected Continued Care - Discharged on 4/25/2024 Admission date: 4/23/2024 - Discharge disposition: Home or Self Care      Destination    No services have been selected for the patient.                Durable Medical Equipment    No services have been selected for the patient.                Dialysis/Infusion    No services have been selected for the patient.                Home Medical Care    No services have been selected for the patient.                Therapy    No services have been selected for the patient.                Community Resources    No services have been selected for the patient.                Community & DME    No services have been selected for the patient.                    Transportation Services  Private: Car    Final Discharge Disposition Code: 01 - home or self-care

## 2024-04-26 NOTE — CASE MANAGEMENT/SOCIAL WORK
Continued Stay Note  [facility]     Patient Name: Brittany Villeda  MRN: [id number]  Today's Date: 4/26/2024    Admit Date: 4/25/2024    Plan: Home with home health, family to transport   Discharge Plan       Row Name 04/26/24 1627       Plan    Plan Home with home health, family to transport    Plan Comments [facility]  health declined due to insurance. Referral placed [facility]. Contacted [facility] [facility] and informed of referral.                   Discharge Codes    No documentation.                 Expected Discharge Date and Time       Expected Discharge Date Expected Discharge Time    Apr 28, 2024               Angle Michel RN

## 2024-04-26 NOTE — THERAPY EVALUATION
Patient Name: Brittany Villeda  : 1935    MRN: [id number]                              Today's Date: 2024       Admit Date: 2024    Visit Dx:     ICD-10-CM ICD-9-CM   1. Acute renal insufficiency  N28.9 593.9   2. Hypomagnesemia  E83.42 275.2   3. Hypokalemia  E87.6 276.8   4. Dyspnea on exertion  R06.09 786.09   5. Generalized weakness  R53.1 780.79   6. Hypotension, unspecified hypotension type  I95.9 458.9     Patient Active Problem List   Diagnosis    Non-toxic multinodular goiter    Chronic midline low back pain with right-sided sciatica    Essential hypertension    Gastroesophageal reflux disease without esophagitis    Cataract    Pulmonary hypertension    Diastolic dysfunction    HUGO (obstructive sleep apnea)    Trifascicular block    MGUS (monoclonal gammopathy of unknown significance)    Ulcerative rectosigmoiditis without complication    Lichen sclerosus    Heart block    Sleep-related hypoxia    Bradycardia    History of atrial fibrillation    Pacemaker    Paroxysmal SVT (supraventricular tachycardia)    History of chest pain    Palpitations    Paroxysmal atrial fibrillation    Ascending aortic aneurysm    Mediastinal lymphadenopathy    Anemia    Obesity (BMI 30-39.9)    Generalized weakness    Dysautonomia    Hypercalcemia    Hyperparathyroidism    Choledocholithiasis    Duodenal ulcer    Hemorrhagic gastritis    Exertional dyspnea    COPD (chronic obstructive pulmonary disease)    Osteoarthritis of glenohumeral joint    ICH (intracerebral hemorrhage)    Lower GI bleed    Thrombocytopenia    Lichen sclerosus of female genitalia    Senile atrophic vaginitis    Left inguinal hernia    Vulvar pain    Bilateral lower extremity edema    Chronic diastolic congestive heart failure    Acute renal insufficiency    Hypokalemia     Past Medical History:   Diagnosis Date    Abdominal aortic aneurysm     Anemia     Arrhythmia     Arthritis     Asthma     Bradycardia     Choledocholithiasis  05/09/2021    Colitis     Diastolic dysfunction     Essential hypertension 05/12/2016    GERD (gastroesophageal reflux disease)     Heart block     Hypertension     Hypertensive heart disease     Hyperthyroidism     REPORTS ENLARGED    Inguinal hernia     Kidney stone     Leukopenia     MGUS (monoclonal gammopathy of unknown significance)     Nephrolithiasis     Pacemaker     Paroxysmal atrial fibrillation     Peptic ulceration     Pressure ulcer     REPORTS ON COCCYX. INSTR TO NOTIFY DR BAIRES'S OFFICE    PSVT (paroxysmal supraventricular tachycardia)     Pulmonary hypertension     Rectal bleed     Sleep apnea     NO DEVICE    Subdural hematoma     Systemic hypertension     Trifascicular block     Ulcerative rectosigmoiditis without complication     Ventricular tachycardia     nonsustained     Past Surgical History:   Procedure Laterality Date    BACK SURGERY      lumbar fusion    DUSTY HOLE Left 08/08/2021    Procedure: Left-sided dusty holes for evacuation of subdural hematoma;  Surgeon: Gustavo Callaway MD;  Location: [facility] MAIN OR;  Service: Neurosurgery;  Laterality: Left;    CARDIAC ELECTROPHYSIOLOGY PROCEDURE N/A 11/07/2018    Procedure: Pacemaker DC new   [place];  Surgeon: Jesus Granda MD;  Location: [facility] CATH INVASIVE LOCATION;  Service: Cardiology    CHOLECYSTECTOMY      COLONOSCOPY  06/16/2014    colitis, cryptitis,  tics, NBIH, TA w/low grade dysplasia    COLONOSCOPY N/A 10/28/2019    Procedure: COLONOSCOPY WITH COLD AND HOT POLYPECTOMIES;  Surgeon: Micaela English MD;  Location: [facility] ENDOSCOPY;  Service: Gastroenterology    ENDOSCOPY N/A 05/13/2021    Procedure: ESOPHAGOGASTRODUODENOSCOPY with BX;  Surgeon: Stefan Mcfadden MD;  Location: [facility] ENDOSCOPY;  Service: Gastroenterology;  Laterality: N/A;  EPIGASTRIC PAIN  --DUODENAL ULCER, HEMORRHAGIC GASTRITIS, HIATAL HERNIA     ENDOSCOPY N/A 10/11/2022    Procedure: ESOPHAGOGASTRODUODENOSCOPY;  Surgeon: Micaela English MD;   Location: [facility] ENDOSCOPY;  Service: Gastroenterology;  Laterality: N/A;  PRE- MELENA  POST- ESOPHAGITIS    HEMORRHOIDECTOMY      HERNIA REPAIR      umbilical    HYSTERECTOMY      INGUINAL HERNIA REPAIR Left 9/1/2023    Procedure: INGUINAL HERNIA REPAIR LEFT;  Surgeon: Antonio Foster MD;  [place] [facility] [place] [facility]  Service: General;  Laterality: Left;    JOINT REPLACEMENT Bilateral     KNEES    MYOMECTOMY      PACEMAKER IMPLANTATION      SHOULDER SURGERY      SIGMOIDOSCOPY N/A 11/02/2022    Procedure: SIGMOIDOSCOPY FLEXIBLE WITH COLD BIOPSIES AND ASPIRATE;  Surgeon: Micaela English MD;  Location: [facility] ENDOSCOPY;  Service: Gastroenterology;  Laterality: N/A;  PRE- RECTAL BLEEDING  POST- HEMORRHOIDS, DIVERTICULOSIS, COLITIS    SINUS SURGERY      TOE NAIL AMPUTATION  03/04/2019    TONSILLECTOMY        General Information       Row Name 04/26/24 1618          Physical Therapy Time and Intention    Document Type evaluation  -DB (r) HB (t) DB (c)     Mode of Treatment physical therapy;individual therapy  -DB (r) HB (t) DB (c)       Row Name 04/26/24 1614          General Information    Patient Profile Reviewed yes  -DB (r) HB (t) DB (c)     Prior Level of Function independent:  mod-I with RW  -DB (r) HB (t) DB (c)     Existing Precautions/Restrictions fall  -DB (r) HB (t) DB (c)     Barriers to Rehab hearing deficit  -DB (r) HB (t) DB (c)       Row Name 04/26/24 1616          Living Environment    People in Home child(donaldo), adult  -DB (r) HB (t) DB (c)       Row Name 04/26/24 1618          Home Main Entrance    Number of Stairs, Main Entrance none  -DB (r) HB (t) DB (c)       Row Name 04/26/24 1618          Stairs Within Home, Primary    Number of Stairs, Within Home, Primary none  -DB (r) HB (t) DB (c)       Row Name 04/26/24 1618          Cognition    Orientation Status (Cognition) oriented x 4  -DB (r) HB (t) DB (c)       Row Name 04/26/24 1616          Safety Issues, Functional Mobility    Safety Issues  Affecting Function (Mobility) insight into deficits/self-awareness;safety precautions follow-through/compliance;safety precaution awareness  -DB (r) HB (t) DB (c)     Impairments Affecting Function (Mobility) balance;endurance/activity tolerance;strength  -DB (r) HB (t) DB (c)               User Key  (r) = Recorded By, (t) = Taken By, (c) = Cosigned By      Initials Name Provider Type    DB Karen Hunt, PT Physical Therapist    Renee Miller PT Student PT Student                   Mobility       Row Name 04/26/24 1632          Bed Mobility    Bed Mobility supine-sit;sit-supine  -DB (r) HB (t) DB (c)     Supine-Sit [place] (Bed Mobility) standby assist  -DB (r) HB (t) DB (c)     Sit-Supine [place] (Bed Mobility) standby assist  -DB (r) HB (t) DB (c)     Assistive Device (Bed Mobility) head of bed elevated;bed rails  -DB (r) HB (t) DB (c)     Comment, (Bed Mobility) Increased time to complete  -DB (r) HB (t) DB (c)       Row Name 04/26/24 1632          Bed-Chair Transfer    Bed-Chair [place] (Transfers) not tested  -DB (r) HB (t) DB (c)       Row Name 04/26/24 1632          Sit-Stand Transfer    Sit-Stand [place] (Transfers) standby assist  -DB (r) HB (t) DB (c)     Assistive Device (Sit-Stand Transfers) walker, front-wheeled  -DB (r) HB (t) DB (c)     Comment, (Sit-Stand Transfer) STS x1 from EOB, no LOB or unsteadiness, appropriate use of BUEs to push up from bed  -DB (r) HB (t) DB (c)       Row Name 04/26/24 1632          Gait/Stairs (Locomotion)    [place] Level (Gait) contact guard  -DB (r) HB (t) DB (c)     Assistive Device (Gait) walker, front-wheeled  -DB (r) HB (t) DB (c)     Distance in Feet (Gait) 80  -DB (r) HB (t) DB (c)     Deviations/Abnormal Patterns (Gait) ovi decreased;gait speed decreased;stride length decreased;base of support, narrow  -DB (r) HB (t) DB (c)     Bilateral Gait Deviations forward flexed posture  -DB (r) HB (t) DB (c)     [place] Level  (Stairs) not tested  -DB (r) HB (t) DB (c)     Comment, (Gait/Stairs) VC's for safety and upright posture, pt able to correct, limited in distance d/t fatigue and generalized weakness  -DB (r) HB (t) DB (c)               User Key  (r) = Recorded By, (t) = Taken By, (c) = Cosigned By      Initials Name Provider Type    DB Karen Hunt, PT Physical Therapist    Renee Miller, PT Student PT Student                   Obj/Interventions       Row Name 04/26/24 1634          Range of Motion Comprehensive    General Range of Motion no range of motion deficits identified  -DB (r) HB (t) DB (c)       Row Name 04/26/24 1634          Strength Comprehensive (MMT)    Comment, General Manual Muscle Testing (MMT) Assessment BLE grossly 4-/5  -DB (r) HB (t) DB (c)       Row Name 04/26/24 1634          Balance    Balance Assessment sitting static balance;sitting dynamic balance;standing static balance;standing dynamic balance  -DB (r) HB (t) DB (c)     Static Sitting Balance standby assist  -DB (r) HB (t) DB (c)     Dynamic Sitting Balance standby assist  -DB (r) HB (t) DB (c)     Position, Sitting Balance unsupported;sitting edge of bed  -DB (r) HB (t) DB (c)     Static Standing Balance contact guard  -DB (r) HB (t) DB (c)     Dynamic Standing Balance contact guard  -DB (r) HB (t) DB (c)     Position/Device Used, Standing Balance walker, front-wheeled;supported  -DB (r) HB (t) DB (c)     Balance Interventions sitting;standing;sit to stand  -DB (r) HB (t) DB (c)     Comment, Balance Increased UE support on RW in stance, no LOB noted throughout, demonstrates slight unsteadiness on her feet  -DB (r) HB (t) DB (c)               User Key  (r) = Recorded By, (t) = Taken By, (c) = Cosigned By      Initials Name Provider Type    DB Karen Hunt, PT Physical Therapist    Renee Miller, PT Student PT Student                   Goals/Plan       Row Name 04/26/24 1643          Bed Mobility Goal 1 (PT)    Activity/Assistive Device  (Bed Mobility Goal 1, PT) bed mobility activities, all  -DB (r) HB (t) DB (c)     [place] Level/Cues Needed (Bed Mobility Goal 1, PT) modified independence  -DB (r) HB (t) DB (c)     Time Frame (Bed Mobility Goal 1, PT) long term goal (LTG);1 week  -DB (r) HB (t) DB (c)       Row Name 04/26/24 164          Transfer Goal 1 (PT)    Activity/Assistive Device (Transfer Goal 1, PT) sit-to-stand/stand-to-sit;walker, rolling  -DB (r) HB (t) DB (c)     [place] Level/Cues Needed (Transfer Goal 1, PT) modified independence  -DB (r) HB (t) DB (c)     Time Frame (Transfer Goal 1, PT) long term goal (LTG);1 week  -DB (r) HB (t) DB (c)       Row Name 04/26/24 1649          Gait Training Goal 1 (PT)    Activity/Assistive Device (Gait Training Goal 1, PT) gait (walking locomotion);walker, rolling  -DB (r) HB (t) DB (c)     [place] Level (Gait Training Goal 1, PT) modified independence  -DB (r) HB (t) DB (c)     Distance (Gait Training Goal 1, PT) Pt to ambulate >100 ft with RW  -DB (r) HB (t) DB (c)     Time Frame (Gait Training Goal 1, PT) long term goal (LTG);1 week  -DB (r) HB (t) DB (c)       Row Name 04/26/24 9844          Therapy Assessment/Plan (PT)    Planned Therapy Interventions (PT) balance training;bed mobility training;gait training;home exercise program;patient/family education;neuromuscular re-education;postural re-education;transfer training;ROM (range of motion);stair training;strengthening  -DB (r) HB (t) DB (c)               User Key  (r) = Recorded By, (t) = Taken By, (c) = Cosigned By      Initials Name Provider Type    Karen Norman, PT Physical Therapist    Renee Miller, PT Student PT Student                   Clinical Impression       Row Name 04/26/24 163          Pain    Pretreatment Pain Rating 0/10 - no pain  -DB (r) HB (t) DB (c)     Posttreatment Pain Rating 0/10 - no pain  -DB (r) HB (t) DB (c)       Row Name 04/26/24 1300          Plan of Care Review    Plan of Care Reviewed  With patient;daughter  -DB (r) HB (t) DB (c)     Outcome Evaluation Pt is an 88 y/o F presenting with c/o SOA and generalized weakness, further workup revealing acute renal insufficiency. Pt recently d/c'd from [facility] on 4/25, admitted with BLE edema and weeping. PMH includes HTN, a-fib, HF, pacemaker, and renal failure. Pt greeted in supine, agreeable to PT evaluation this PM. Daughter present throughout session. A&O x4. Reports living in single-story home with son, ramp to enter, mod-I with RW. Daughter reports that someone is typically at home with the pt 24/7. This date, pt presents with reduced balance, strength, and endurance, limiting her overall mobility. Transfers to EOB with SBA, completes STS and ambulates x80 ft with CGA and RW. Demonstrates reduced gait speed, fwd flexed posture, and downward gaze. Limited in distance d/t fatigue. Denies pain or SOA. Returned pt to bed and left in supine. PT will continue to monitor throughout course of care. Recommending d/c home with HH.  -DB (r) HB (t) DB (c)       Row Name 04/26/24 4569          Therapy Assessment/Plan (PT)    Rehab Potential (PT) good, to achieve stated therapy goals  -DB (r) HB (t) DB (c)     Criteria for Skilled Interventions Met (PT) yes  -DB (r) HB (t) DB (c)     Therapy Frequency (PT) 5 times/wk  -DB (r) HB (t) DB (c)       Row Name 04/26/24 4254          Vital Signs    O2 Delivery Pre Treatment room air  -DB (r) HB (t) DB (c)     O2 Delivery Intra Treatment room air  -DB (r) HB (t) DB (c)     O2 Delivery Post Treatment room air  -DB (r) HB (t) DB (c)     Pre Patient Position Supine  -DB (r) HB (t) DB (c)     Intra Patient Position Standing  -DB (r) HB (t) DB (c)     Post Patient Position Supine  -DB (r) HB (t) DB (c)       Row Name 04/26/24 1956          Positioning and Restraints    Pre-Treatment Position in bed  -DB (r) HB (t) DB (c)     Post Treatment Position bed  -DB (r) HB (t) DB (c)     In Bed supine;call light within reach;encouraged to  call for assist;exit alarm on;with family/caregiver  -DB (r) HB (t) DB (c)               User Key  (r) = Recorded By, (t) = Taken By, (c) = Cosigned By      Initials Name Provider Type    Karen Norman, PT Physical Therapist    Renee Miller, PT Student PT Student                   Outcome Measures       Row Name 04/26/24 1644          How much help from another person do you currently need...    Turning from your back to your side while in flat bed without using bedrails? 4  -DB (r) HB (t) DB (c)     Moving from lying on back to sitting on the side of a flat bed without bedrails? 4  -DB (r) HB (t) DB (c)     Moving to and from a bed to a chair (including a wheelchair)? 3  -DB (r) HB (t) DB (c)     Standing up from a chair using your arms (e.g., wheelchair, bedside chair)? 3  -DB (r) HB (t) DB (c)     Climbing 3-5 steps with a railing? 3  -DB (r) HB (t) DB (c)     To walk in hospital room? 3  -DB (r) HB (t) DB (c)     AM-PAC 6 Clicks Score (PT) 20  -DB (r) HB (t)     Highest Level of Mobility Goal 6 --> Walk 10 steps or more  -DB (r) HB (t)       Row Name 04/26/24 1644          Functional Assessment    Outcome Measure Options AM-PAC 6 Clicks Basic Mobility (PT)  -DB (r) HB (t) DB (c)               User Key  (r) = Recorded By, (t) = Taken By, (c) = Cosigned By      Initials Name Provider Type    Karen Norman, PT Physical Therapist    Renee Miller, PT Student PT Student                                 Physical Therapy Education       Title: PT OT SLP Therapies (Done)       Topic: Physical Therapy (Done)       Point: Mobility training (Done)       Learning Progress Summary             Patient Acceptance, E, VU by HB at 4/26/2024 1645                         Point: Home exercise program (Done)       Learning Progress Summary             Patient Acceptance, E, VU by HB at 4/26/2024 1645                         Point: Body mechanics (Done)       Learning Progress Summary             Patient Acceptance,  E, VU by  at 4/26/2024 1645                         Point: Precautions (Done)       Learning Progress Summary             Patient Acceptance, E, VU by  at 4/26/2024 1645                                         User Key       Initials Effective Dates Name Provider Type Discipline     02/21/24 -  Renee Chong, PT Student PT Student PT                  PT Recommendation and Plan  Planned Therapy Interventions (PT): balance training, bed mobility training, gait training, home exercise program, patient/family education, neuromuscular re-education, postural re-education, transfer training, ROM (range of motion), stair training, strengthening  Plan of Care Reviewed With: patient, daughter  Outcome Evaluation: Pt is an 89 y/o F presenting with c/o SOA and generalized weakness, further workup revealing acute renal insufficiency. Pt recently d/c'd from [facility] on 4/25, admitted with BLE edema and weeping. PMH includes HTN, a-fib, HF, pacemaker, and renal failure. Pt greeted in supine, agreeable to PT evaluation this PM. Daughter present throughout session. A&O x4. Reports living in single-story home with son, ramp to enter, mod-I with RW. Daughter reports that someone is typically at home with the pt 24/7. This date, pt presents with reduced balance, strength, and endurance, limiting her overall mobility. Transfers to EOB with SBA, completes STS and ambulates x80 ft with CGA and RW. Demonstrates reduced gait speed, fwd flexed posture, and downward gaze. Limited in distance d/t fatigue. Denies pain or SOA. Returned pt to bed and left in supine. PT will continue to monitor throughout course of care. Recommending d/c home with HH.     Time Calculation:         PT Charges       Row Name 04/26/24 1645             Time Calculation    Start Time 1559  -DB (r) HB (t) DB (c)      Stop Time 1615  -DB (r) HB (t) DB (c)      Time Calculation (min) 16 min  -DB (r) HB (t)      PT Received On 04/26/24  -DB (r) HB (t) DB (c)      PT - Next  Appointment 04/29/24  -DB (r) HB (t) DB (c)      PT Goal Re-Cert Due Date 05/03/24  -DB (r) HB (t) DB (c)         Time Calculation- PT    Total Timed Code Minutes- PT 8 minute(s)  -DB (r) HB (t) DB (c)         Timed Charges    66963 - PT Therapeutic Activity Minutes 8  -DB (r) HB (t) DB (c)         Total Minutes    Timed Charges Total Minutes 8  -DB (r) HB (t)       Total Minutes 8  -DB (r) HB (t)                User Key  (r) = Recorded By, (t) = Taken By, (c) = Cosigned By      Initials Name Provider Type    DB Karen Hunt, PT Physical Therapist    Renee Miller, PT Student PT Student                  Therapy Charges for Today       Code Description Service Date Service Provider Modifiers Qty    68848972362 HC PT THERAPEUTIC ACT EA 15 MIN 4/26/2024 Renee Chong, PT Student GP 1    22110460804 HC PT EVAL MOD COMPLEXITY 2 4/26/2024 Renee Chong, PT Student GP 1            PT G-Codes  Outcome Measure Options: AM-PAC 6 Clicks Basic Mobility (PT)  AM-PAC 6 Clicks Score (PT): 20  PT Discharge Summary  Anticipated Discharge Disposition (PT): home with assist, home with home health    Renee Chong PT Student  4/26/2024

## 2024-04-26 NOTE — NURSING NOTE
04/26/24 1050   Wound 04/23/24 2200 Left medial gluteal Pressure Injury   Placement Date/Time: 04/23/24 2200   Side: Left  Orientation: medial  Location: gluteal  Primary Wound Type: (c) Pressure Injury   Pressure Injury Stage DTPI   Dressing Appearance moist drainage   Closure None   Base non-blanchable;moist;maroon/purple   Periwound excoriated;moist;swelling;redness   Periwound Temperature warm   Periwound Skin Turgor soft   Edges open   Drainage Characteristics/Odor serosanguineous   Drainage Amount scant   Skin Interventions   Pressure Reduction Devices alternating pressure pump (ADD)  (Accumax pump)   Pressure Reduction Techniques positioned off wounds;pressure points protected;heels elevated off bed     Wound/Ostomy: We see patient at the request of the floor nurse regarding skin issue on leftgluteal area. Patient alert, able to turn with assistance, upon assessment is observed non-intact skin localized area of persistent non-blanchable purple discoloration, DTI POA evolving into an open blister, Calmoseptine is ordered.  Wound care order and pressure ulcer preventives  measures have been implemented into Epic.   Currently on Accumax pump for adequate pressure redistribution and pressure relief.  She is at risk for further skin problems, is completely bedridden, has multiple contractures, impaired mobility and is incontinent, repositioning her every two hours and minimizing any skin contact with urine or stool would be beneficial.  Bilateral Heels are with intact skin and normal coloration, protective padding ordered. Please keep the heels off-loaded, at all the time.  Re-consult for any additional needs.

## 2024-04-27 ENCOUNTER — READMISSION MANAGEMENT (OUTPATIENT)
Dept: CALL CENTER | Facility: HOSPITAL | Age: 89
End: 2024-04-27
Payer: MEDICARE

## 2024-04-27 VITALS
BODY MASS INDEX: 28.68 KG/M2 | WEIGHT: 167.99 LBS | DIASTOLIC BLOOD PRESSURE: 65 MMHG | TEMPERATURE: 98.4 F | HEIGHT: 64 IN | HEART RATE: 60 BPM | SYSTOLIC BLOOD PRESSURE: 107 MMHG | OXYGEN SATURATION: 96 % | RESPIRATION RATE: 18 BRPM

## 2024-04-27 PROBLEM — E87.6 HYPOKALEMIA: Status: RESOLVED | Noted: 2024-04-26 | Resolved: 2024-04-27

## 2024-04-27 LAB
ANION GAP SERPL CALCULATED.3IONS-SCNC: 9.3 MMOL/L (ref 5–15)
BASOPHILS # BLD AUTO: 0.01 10*3/MM3 (ref 0–0.2)
BASOPHILS NFR BLD AUTO: 0.3 % (ref 0–1.5)
BUN SERPL-MCNC: 18 MG/DL (ref 8–23)
BUN/CREAT SERPL: 21.7 (ref 7–25)
CALCIUM SPEC-SCNC: 9.7 MG/DL (ref 8.6–10.5)
CHLORIDE SERPL-SCNC: 101 MMOL/L (ref 98–107)
CO2 SERPL-SCNC: 27.7 MMOL/L (ref 22–29)
CREAT SERPL-MCNC: 0.83 MG/DL (ref 0.57–1)
DEPRECATED RDW RBC AUTO: 53.7 FL (ref 37–54)
EGFRCR SERPLBLD CKD-EPI 2021: 67.5 ML/MIN/1.73
EOSINOPHIL # BLD AUTO: 0.01 10*3/MM3 (ref 0–0.4)
EOSINOPHIL NFR BLD AUTO: 0.3 % (ref 0.3–6.2)
ERYTHROCYTE [DISTWIDTH] IN BLOOD BY AUTOMATED COUNT: 13.5 % (ref 12.3–15.4)
GLUCOSE SERPL-MCNC: 105 MG/DL (ref 65–99)
HCT VFR BLD AUTO: 40 % (ref 34–46.6)
HGB BLD-MCNC: 12.9 G/DL (ref 12–15.9)
LYMPHOCYTES # BLD AUTO: 1.56 10*3/MM3 (ref 0.7–3.1)
LYMPHOCYTES NFR BLD AUTO: 45.1 % (ref 19.6–45.3)
MCH RBC QN AUTO: 34.3 PG (ref 26.6–33)
MCHC RBC AUTO-ENTMCNC: 32.3 G/DL (ref 31.5–35.7)
MCV RBC AUTO: 106.4 FL (ref 79–97)
MONOCYTES # BLD AUTO: 0.49 10*3/MM3 (ref 0.1–0.9)
MONOCYTES NFR BLD AUTO: 14.2 % (ref 5–12)
NEUTROPHILS NFR BLD AUTO: 1.38 10*3/MM3 (ref 1.7–7)
NEUTROPHILS NFR BLD AUTO: 39.8 % (ref 42.7–76)
PLATELET # BLD AUTO: 115 10*3/MM3 (ref 140–450)
PMV BLD AUTO: 9.1 FL (ref 6–12)
POTASSIUM SERPL-SCNC: 3.9 MMOL/L (ref 3.5–5.2)
RBC # BLD AUTO: 3.76 10*6/MM3 (ref 3.77–5.28)
SODIUM SERPL-SCNC: 138 MMOL/L (ref 136–145)
WBC NRBC COR # BLD AUTO: 3.46 10*3/MM3 (ref 3.4–10.8)

## 2024-04-27 PROCEDURE — 80048 BASIC METABOLIC PNL TOTAL CA: CPT | Performed by: INTERNAL MEDICINE

## 2024-04-27 PROCEDURE — G0378 HOSPITAL OBSERVATION PER HR: HCPCS

## 2024-04-27 PROCEDURE — 85025 COMPLETE CBC W/AUTO DIFF WBC: CPT | Performed by: INTERNAL MEDICINE

## 2024-04-27 RX ADMIN — Medication 1000 MCG: at 10:09

## 2024-04-27 RX ADMIN — VALACYCLOVIR HYDROCHLORIDE 1000 MG: 500 TABLET, FILM COATED ORAL at 10:10

## 2024-04-27 RX ADMIN — CLOBETASOL PROPIONATE CREAM USP, 0.05%: 0.5 CREAM TOPICAL at 10:11

## 2024-04-27 RX ADMIN — MESALAMINE 1.5 G: 0.38 CAPSULE, EXTENDED RELEASE ORAL at 10:10

## 2024-04-27 RX ADMIN — Medication 1 APPLICATION: at 10:11

## 2024-04-27 RX ADMIN — FUROSEMIDE 40 MG: 40 TABLET ORAL at 10:10

## 2024-04-27 RX ADMIN — Medication 1 TABLET: at 10:10

## 2024-04-27 RX ADMIN — PANTOPRAZOLE SODIUM 40 MG: 40 TABLET, DELAYED RELEASE ORAL at 10:10

## 2024-04-27 NOTE — OUTREACH NOTE
Prep Survey      Flowsheet Row Responses   [facility] patient discharged from? [place]   Is LACE score < 7 ? No   Eligibility [facility]   Date of Admission 04/25/24   Date of Discharge 04/27/24   Discharge Disposition Home-Health Care Sv   Discharge diagnosis Acute renal insufficiency   Does the patient have one of the following disease processes/diagnoses(primary or secondary)? CHF   Does the patient have Home health ordered? Yes   What is the Home health agency?  [facility]--pending   Is there a DME ordered? No   Prep survey completed? Yes            Nicole AGLLO - Registered Nurse

## 2024-04-27 NOTE — DISCHARGE SUMMARY
"Date of Admission: 4/25/2024  Date of Discharge:  4/27/2024  Primary Care Physician: Mega Esparza MD     Discharge Diagnosis:  Active Hospital Problems    Diagnosis  POA    **Acute renal insufficiency [N28.9]  Yes    Chronic diastolic congestive heart failure [I50.32]  Yes    Thrombocytopenia [D69.6]  Yes    COPD (chronic obstructive pulmonary disease) [J44.9]  Yes    Generalized weakness [R53.1]  Yes    Paroxysmal atrial fibrillation [I48.0]  Yes    Pulmonary hypertension [I27.20]  Yes    HUGO (obstructive sleep apnea) [G47.33]  Yes    Essential hypertension [I10]  Yes      Resolved Hospital Problems    Diagnosis Date Resolved POA    Hypokalemia [E87.6] 04/27/2024 Yes       Presenting Problem/History of Present Illness:  Hypokalemia [E87.6]  Hypomagnesemia [E83.42]  Dyspnea on exertion [R06.09]  Acute renal insufficiency [N28.9]  Generalized weakness [R53.1]  Hypotension, unspecified hypotension type [I95.9]     Hospital Course:  The patient is a 89 y.o. female with a history of HTN, COPD, HUGO, PAF, and chronic diastolic CHF recently admitted with a CHF exacerbation and diuresed extensively who presented the day following her discharge with generalized weakness and shortness of breath. Please see admission H&P for further details. She did not have evidence of a COPD exacerbation or other primary acute pulmonary issue. She was slightly volume depleted and she received a small amount of IV fluid after which her symptoms and labs significantly improved. She was started back on her oral lasix at 40mg daily which she tolerated well. She is feeling much better and will be discharged home on this regimen. I reviewed the measurement of her daily weights at home with the patient and her daughter and have asked her to follow up with her PCP or cardiologist next week.     Exam Today:  Blood pressure 107/65, pulse 60, temperature 98.4 °F (36.9 °C), temperature source Oral, resp. rate 18, height 162.6 cm (64\"), weight 76.2 " kg (167 lb 15.9 oz), SpO2 96%, not currently breastfeeding.  Vitals and nursing note reviewed.   Constitutional:       General: She is not in acute distress.     Appearance: She is well-developed. She is not toxic-appearing.   HENT:      Head: Normocephalic and atraumatic.   Eyes:      General: No scleral icterus.        Right eye: No discharge.         Left eye: No discharge.      Conjunctiva/sclera: Conjunctivae normal.   Neck:      Vascular: No JVD.   Cardiovascular:      Rate and Rhythm: Normal rate and regular rhythm.      Heart sounds: Normal heart sounds. No murmur heard.     No friction rub. No gallop.      Comments: Trace BLE edema  Pulmonary:      Effort: Pulmonary effort is normal. No respiratory distress.      Breath sounds: Normal breath sounds. No wheezing or rales.   Abdominal:      General: Bowel sounds are normal. There is no distension.      Palpations: Abdomen is soft.      Tenderness: There is no abdominal tenderness. There is no guarding.   Musculoskeletal:         General: No tenderness or deformity. Normal range of motion.      Cervical back: Normal range of motion and neck supple.   Skin:     General: Skin is warm and dry.      Capillary Refill: Capillary refill takes less than 2 seconds.   Neurological:      Mental Status: She is alert and oriented to person, place, and time.   Psychiatric:         Behavior: Behavior normal.     Consults:   Consults       Date and Time Order Name Status Description    4/25/2024  9:47 PM MISAEL (on-call MD unless specified) Details      4/24/2024 10:27 AM Inpatient Hospitalist Consult Completed     4/23/2024  8:57 PM Inpatient Cardiology Consult Completed              Discharge Disposition:  Home or Self Care    Discharge Medications:     Discharge Medications        Continue These Medications        Instructions Start Date   acetaminophen 500 MG tablet  Commonly known as: TYLENOL   500 mg, Oral, Every 6 Hours PRN      clobetasol 0.05 % ointment  Commonly known  as: TEMOVATE   1 Application, Topical, 2 Times Daily      clonazePAM 0.5 MG tablet  Commonly known as: KlonoPIN   0.5 mg, Oral, 2 Times Daily PRN      furosemide 40 MG tablet  Commonly known as: LASIX   40 mg, Oral, Daily      lidocaine 5 % ointment  Commonly known as: XYLOCAINE   1 Application, Topical, Daily      Magnesium Oxide -Mg Supplement 400 (240 Mg) MG tablet   400 mg, Oral, Daily      mesalamine 0.375 g 24 hr capsule  Commonly known as: APRISO   1.5 g, Oral, Daily      nystatin 532266 UNIT/GM ointment  Commonly known as: MYCOSTATIN   Apply 1 Application topically to the appropriate area as directed As Needed.      pantoprazole 40 MG EC tablet  Commonly known as: PROTONIX   40 mg, Oral, Daily      potassium chloride 20 MEQ CR tablet  Commonly known as: KLOR-CON M20   20 mEq, Oral, Daily      tacrolimus 0.1 % ointment  Commonly known as: PROTOPIC   1 Application, Topical, 2 Times Daily      valACYclovir 1000 MG tablet  Commonly known as: VALTREX   1,000 mg, Oral, Daily      vitamin B-12 1000 MCG tablet  Commonly known as: CYANOCOBALAMIN   1,000 mcg, Oral, Daily               Discharge Diet:   Diet Instructions       Diet: Cardiac Diets; Healthy Heart (2-3 Na+); Regular (IDDSI 7); Thin (IDDSI 0)      Discharge Diet: Cardiac Diets    Cardiac Diet: Healthy Heart (2-3 Na+)    Texture: Regular (IDDSI 7)    Fluid Consistency: Thin (IDDSI 0)            Activity at Discharge:   Activity Instructions       Activity as Tolerated              Follow-up Appointments:  Future Appointments   Date Time Provider Department Center   5/7/2024 10:30 AM Jaye Floyd APRN [facility]   6/3/2024 11:15 AM Micaela English MD [facility]   10/10/2024  8:30 AM TREV BONILLA [place] DEVICE CHECK [facility] None   10/10/2024  9:00 AM Mary Grace Culp MD [facility]   10/22/2024  9:45 AM Mega Esparza MD [facility]     Additional Instructions for the Follow-ups that You Need to Schedule       Ambulatory  Referral to Home Health (Hospital)   As directed      Face to Face Visit Date: 4/27/2024   Follow-up provider for Plan of Care?: I treated the patient in an acute care facility and will not continue treatment after discharge.   Follow-up provider: MARIA ISABEL RANDHAWA [[id number]]   Reason/Clinical Findings: generalized weakness, CHF   Describe mobility limitations that make leaving home difficult: generalized weakness, CHF   Nursing/Therapeutic Services Requested: Physical Therapy Skilled Nursing   Skilled nursing orders: CHF management   PT orders: Strengthening   Frequency: 1 Week 1        Discharge Follow-up with PCP   As directed       Currently Documented PCP:    Maria Isabel Randhawa MD    PCP Phone Number:    [phone]     Follow Up Details: next week                   Ash Marlow MD  04/27/24  11:16 EDT    Time Spent on Discharge Activities: Greater than 30 minutes.

## 2024-04-27 NOTE — PLAN OF CARE
Goal Outcome Evaluation:              Outcome Evaluation: pt states she gets tired easy but feeling better. vss. family at bedside, cont  to monitor                                Letter was picked up by son Karyle Rast on 10/11/23.

## 2024-04-29 ENCOUNTER — TRANSITIONAL CARE MANAGEMENT TELEPHONE ENCOUNTER (OUTPATIENT)
Dept: CALL CENTER | Facility: HOSPITAL | Age: 89
End: 2024-04-29
Payer: MEDICARE

## 2024-04-29 NOTE — OUTREACH NOTE
Call Center TCM Note      Flowsheet Row Responses   [facility] patient discharged from? [place]   Does the patient have one of the following disease processes/diagnoses(primary or secondary)? CHF   TCM attempt successful? Yes   Call start time 0904   Call end time 0908   Discharge diagnosis Acute renal insufficiency   Is patient permission given to speak with other caregiver? Yes   Person spoke with today (if not patient) and relationship dtr Amina Abebe reviewed with patient/caregiver? Yes   Does the patient have all medications ordered at discharge? N/A   Prescription comments No changes to current medications at 04/27/2024 hospital discharge   Is the patient taking all medications as directed (includes completed medication regime)? Yes   Comments Pt/family very much want TCM APPT with PCP Dr Mega Esparza but PCP has no available times I can access to schedule this appt, if office can please call pt to schedule.   Does the patient have an appointment with their PCP within 7-14 days of discharge? No appointments available   Nursing Interventions Routed TCM call to PCP office, PCP office requested to make appointment - message sent   What is the Home health agency?  [facility] --pending   Home health comments [facility] --pending   Pulse Ox monitoring None   Psychosocial issues? No   Did the patient receive a copy of their discharge instructions? Yes   Nursing interventions Reviewed instructions with patient   What is the patient's perception of their health status since discharge? Improving   If the patient is a current smoker, are they able to teach back resources for cessation? Not a smoker   Is the patient/caregiver able to teach back the hierarchy of who to call/visit for symptoms/problems? PCP, Specialist, Home health nurse, Urgent Care, ED, 911 Yes   TCM call completed? Yes   Wrap up additional comments D/C DX: Acute renal insufficiency - Pt admitted 04/23-04/25/2024 for a/c diastolic CHF,  readmitted later same day discharging on 04/27/2024. Per dtr Amina pt does seem to be doing better at this time. Pt/family very much want TCM APPT with PCP Dr Mega Esparza but PCP has no available times I can access to schedule this appt, if office can please call pt to schedule.   Call end time 0908             Janice Oakes MA    4/29/2024, 09:12 EDT

## 2024-04-30 ENCOUNTER — TELEPHONE (OUTPATIENT)
Dept: FAMILY MEDICINE CLINIC | Facility: CLINIC | Age: 89
End: 2024-04-30

## 2024-04-30 NOTE — TELEPHONE ENCOUNTER
Caller: Amina Diana    Relationship: Emergency Contact    Best call back number: [phone]     What was the call regarding: RETURNING CALL

## 2024-04-30 NOTE — PROGRESS NOTES
Called to schedule hospital fu for pt with pts daughter. No answer lvm for pts daughter to give a callback to be scheduled    Hub to relay and xfer into office for scheduling

## 2024-05-06 ENCOUNTER — OFFICE VISIT (OUTPATIENT)
Dept: FAMILY MEDICINE CLINIC | Facility: CLINIC | Age: 89
End: 2024-05-06
Payer: MEDICARE

## 2024-05-06 VITALS
HEIGHT: 64 IN | TEMPERATURE: 97.1 F | SYSTOLIC BLOOD PRESSURE: 106 MMHG | OXYGEN SATURATION: 95 % | HEART RATE: 78 BPM | DIASTOLIC BLOOD PRESSURE: 55 MMHG | WEIGHT: 170 LBS | BODY MASS INDEX: 29.02 KG/M2

## 2024-05-06 DIAGNOSIS — Z09 HOSPITAL DISCHARGE FOLLOW-UP: Primary | ICD-10-CM

## 2024-05-06 DIAGNOSIS — I50.32 CHRONIC DIASTOLIC CONGESTIVE HEART FAILURE: ICD-10-CM

## 2024-05-06 PROCEDURE — 99495 TRANSJ CARE MGMT MOD F2F 14D: CPT | Performed by: FAMILY MEDICINE

## 2024-05-06 PROCEDURE — 1111F DSCHRG MED/CURRENT MED MERGE: CPT | Performed by: FAMILY MEDICINE

## 2024-05-09 RX ORDER — MESALAMINE 0.38 G/1
1.5 CAPSULE, EXTENDED RELEASE ORAL DAILY
Qty: 360 CAPSULE | Refills: 0 | Status: SHIPPED | OUTPATIENT
Start: 2024-05-09

## 2024-05-10 ENCOUNTER — HOSPITAL ENCOUNTER (OUTPATIENT)
Dept: CARDIOLOGY | Facility: HOSPITAL | Age: 89
Discharge: HOME | End: 2024-05-10
Payer: MEDICARE

## 2024-05-10 ENCOUNTER — OFFICE VISIT (OUTPATIENT)
Dept: CARDIOLOGY | Facility: CLINIC | Age: 89
End: 2024-05-10
Payer: MEDICARE

## 2024-05-10 ENCOUNTER — TELEPHONE (OUTPATIENT)
Dept: CARDIOLOGY | Facility: CLINIC | Age: 89
End: 2024-05-10
Payer: MEDICARE

## 2024-05-10 VITALS
BODY MASS INDEX: 30.22 KG/M2 | DIASTOLIC BLOOD PRESSURE: 64 MMHG | HEART RATE: 65 BPM | WEIGHT: 177 LBS | SYSTOLIC BLOOD PRESSURE: 118 MMHG | HEIGHT: 64 IN

## 2024-05-10 DIAGNOSIS — I71.21 ANEURYSM OF ASCENDING AORTA WITHOUT RUPTURE: ICD-10-CM

## 2024-05-10 DIAGNOSIS — I27.20 PULMONARY HYPERTENSION: ICD-10-CM

## 2024-05-10 DIAGNOSIS — I50.32 CHRONIC DIASTOLIC CONGESTIVE HEART FAILURE: ICD-10-CM

## 2024-05-10 DIAGNOSIS — I47.10 PAROXYSMAL SVT (SUPRAVENTRICULAR TACHYCARDIA): ICD-10-CM

## 2024-05-10 DIAGNOSIS — Z95.0 PACEMAKER: ICD-10-CM

## 2024-05-10 DIAGNOSIS — I48.0 PAROXYSMAL ATRIAL FIBRILLATION: ICD-10-CM

## 2024-05-10 DIAGNOSIS — I10 ESSENTIAL HYPERTENSION: Chronic | ICD-10-CM

## 2024-05-10 DIAGNOSIS — I50.32 CHRONIC DIASTOLIC CONGESTIVE HEART FAILURE: Primary | ICD-10-CM

## 2024-05-10 LAB
ANION GAP SERPL CALCULATED.3IONS-SCNC: 8.1 MMOL/L (ref 5–15)
BUN SERPL-MCNC: 13 MG/DL (ref 8–23)
BUN/CREAT SERPL: 14.4 (ref 7–25)
CALCIUM SPEC-SCNC: 10.3 MG/DL (ref 8.6–10.5)
CHLORIDE SERPL-SCNC: 107 MMOL/L (ref 98–107)
CO2 SERPL-SCNC: 24.9 MMOL/L (ref 22–29)
CREAT SERPL-MCNC: 0.9 MG/DL (ref 0.57–1)
EGFRCR SERPLBLD CKD-EPI 2021: 61.2 ML/MIN/1.73
GLUCOSE SERPL-MCNC: 96 MG/DL (ref 65–99)
POTASSIUM SERPL-SCNC: 4.7 MMOL/L (ref 3.5–5.2)
SODIUM SERPL-SCNC: 140 MMOL/L (ref 136–145)

## 2024-05-10 PROCEDURE — 36415 COLL VENOUS BLD VENIPUNCTURE: CPT

## 2024-05-10 PROCEDURE — 80048 BASIC METABOLIC PNL TOTAL CA: CPT | Performed by: INTERNAL MEDICINE

## 2024-05-10 NOTE — PROGRESS NOTES
Date of Office Visit: 05/10/2024  Encounter Provider: Mary Grace Culp MD  Place of Service: [facility] CARDIOLOGY  Patient Name: Brittany Villeda  :1935    Chief complaint  Paroxysmal atrial fibrillation, thoracic aortic aneurysm    History of Present Illness  The patient is a pleasant, 88-year-old female with a history of hypertension with hypertensive heart disease, obstructive sleep apnea (on BiPAP) obesity, immobility, pulmonary hypertension, history of nonsustained ventricular tachycardia, and obstructive sleep apnea.  She has a history of diastolic dysfunction, nonsustained ventricular tachycardia, atrial fibrillation and bradycardia.  She had a stress perfusion study that was negative in 2019.  She eventually underwent pacemaker placement 2018.  Echo 2021 with ejection fraction 60% with mild RV dysfunction negative saline injection with severe biatrial enlargement and no significant valvular heart disease.  RVSP was 38 mmHg. CT angiogram of the chest was on 2023 when she was in the emergency room for pleuritic chest pain.  Not show pulmonary emboli.  It was a limited evaluation of the aorta.  Previously noted to be 4.1 cm aorta.  The aorta was reviewed with the aortic root measuring 3.6 cm acing aorta measuring 3.7 cm and dilated hepatic veins consistent with right-sided heart failure were noted On 2024 patient was admitted with edema and found to have congestive heart failure treated with IV diuretics and discharged home on Lasix.  It was also suggested she start Jardiance.  Echo at that time showed an ejection fraction 60 to 65% indeterminate diastolic function, mild valve regurgitation and mild to moderate tricuspid regurgitation RVSP 38 mmHg. .      Since last visit patient is not using CPAP.  She has had no palpitations dizziness chest pain.  She believes that her shortness of breath has been worse and she has not improved since her last hospitalization.   Her edema has improved with the use of MICHAELA hose.    Past Medical History:   Diagnosis Date    Abdominal aortic aneurysm     Anemia     Arrhythmia     Arthritis     Asthma     Bradycardia     Choledocholithiasis 05/09/2021    Colitis     Diastolic dysfunction     Essential hypertension 05/12/2016    GERD (gastroesophageal reflux disease)     Heart block     Hypertension     Hypertensive heart disease     Hyperthyroidism     REPORTS ENLARGED    Inguinal hernia     Kidney stone     Leukopenia     MGUS (monoclonal gammopathy of unknown significance)     Nephrolithiasis     Pacemaker     Paroxysmal atrial fibrillation     Peptic ulceration     Pressure ulcer     REPORTS ON COCCYX. INSTR TO NOTIFY DR BAIRES'S OFFICE    PSVT (paroxysmal supraventricular tachycardia)     Pulmonary hypertension     Rectal bleed     Sleep apnea     NO DEVICE    Subdural hematoma     Systemic hypertension     Trifascicular block     Ulcerative rectosigmoiditis without complication     Ventricular tachycardia     nonsustained     Past Surgical History:   Procedure Laterality Date    BACK SURGERY      lumbar fusion    DUSTY HOLE Left 08/08/2021    Procedure: Left-sided dusty holes for evacuation of subdural hematoma;  Surgeon: Gustavo Callaway MD;  Location: [facility] MAIN OR;  Service: Neurosurgery;  Laterality: Left;    CARDIAC ELECTROPHYSIOLOGY PROCEDURE N/A 11/07/2018    Procedure: Pacemaker DC new   [place];  Surgeon: Jseus Granda MD;  Location: [facility] CATH INVASIVE LOCATION;  Service: Cardiology    CHOLECYSTECTOMY      COLONOSCOPY  06/16/2014    colitis, cryptitis,  tics, NBIH, TA w/low grade dysplasia    COLONOSCOPY N/A 10/28/2019    Procedure: COLONOSCOPY WITH COLD AND HOT POLYPECTOMIES;  Surgeon: Micaela English MD;  Location: [facility] ENDOSCOPY;  Service: Gastroenterology    ENDOSCOPY N/A 05/13/2021    Procedure: ESOPHAGOGASTRODUODENOSCOPY with BX;  Surgeon: Stefan Mcfadden MD;  Location: [facility] ENDOSCOPY;  Service:  Gastroenterology;  Laterality: N/A;  EPIGASTRIC PAIN  --DUODENAL ULCER, HEMORRHAGIC GASTRITIS, HIATAL HERNIA     ENDOSCOPY N/A 10/11/2022    Procedure: ESOPHAGOGASTRODUODENOSCOPY;  Surgeon: Micaela English MD;  Location:  [facility] ENDOSCOPY;  Service: Gastroenterology;  Laterality: N/A;  PRE- MELENA  POST- ESOPHAGITIS    HEMORRHOIDECTOMY      HERNIA REPAIR      umbilical    HYSTERECTOMY      INGUINAL HERNIA REPAIR Left 9/1/2023    Procedure: INGUINAL HERNIA REPAIR LEFT;  Surgeon: Antonio Foster MD;  Location:  [facility] OR [facility];  Service: General;  Laterality: Left;    JOINT REPLACEMENT Bilateral     KNEES    MYOMECTOMY      PACEMAKER IMPLANTATION      SHOULDER SURGERY      SIGMOIDOSCOPY N/A 11/02/2022    Procedure: SIGMOIDOSCOPY FLEXIBLE WITH COLD BIOPSIES AND ASPIRATE;  Surgeon: Micaela English MD;  [facility];  Service: Gastroenterology;  Laterality: N/A;  PRE- RECTAL BLEEDING  POST- HEMORRHOIDS, DIVERTICULOSIS, COLITIS    SINUS SURGERY      TOE NAIL AMPUTATION  03/04/2019    TONSILLECTOMY       Outpatient Medications Prior to Visit   Medication Sig Dispense Refill    acetaminophen (TYLENOL) 500 MG tablet Take 1 tablet by mouth Every 6 (Six) Hours As Needed for Mild Pain .      clonazePAM (KlonoPIN) 0.5 MG tablet Take 1 tablet by mouth 2 (Two) Times a Day As Needed for Anxiety (Grief related anxiety). 10 tablet 0    furosemide (LASIX) 40 MG tablet Take 1 tablet by mouth Daily. (Patient taking differently: Take 1 tablet by mouth 3 (Three) Times a Week.) 30 tablet 0    lidocaine (XYLOCAINE) 5 % ointment Apply 1 Application topically to the appropriate area as directed Daily.      Magnesium Oxide -Mg Supplement 400 (240 Mg) MG tablet Take 1 tablet by mouth Daily. 90 tablet 1    mesalamine (APRISO) 0.375 g 24 hr capsule TAKE FOUR CAPSULES BY MOUTH DAILY 360 capsule 0    pantoprazole (PROTONIX) 40 MG EC tablet Take 1 tablet by mouth Daily. 90 tablet 1    potassium chloride (KLOR-CON M20) 20 MEQ CR  tablet Take 1 tablet by mouth Daily. 30 tablet 0    tacrolimus (PROTOPIC) 0.1 % ointment Apply 1 Application topically to the appropriate area as directed 2 (Two) Times a Day.      valACYclovir (VALTREX) 1000 MG tablet Take 1 tablet by mouth Daily.      vitamin B-12 (CYANOCOBALAMIN) 1000 MCG tablet Take 1 tablet by mouth Daily. 90 tablet 1    clobetasol (TEMOVATE) 0.05 % ointment Apply 1 Application topically to the appropriate area as directed 2 (Two) Times a Day. (Patient not taking: Reported on 5/10/2024) 60 g 1    nystatin (MYCOSTATIN) 316856 UNIT/GM ointment Apply 1 Application topically to the appropriate area as directed As Needed. (Patient not taking: Reported on 5/10/2024)       No facility-administered medications prior to visit.       Allergies as of 05/10/2024 - Reviewed 05/10/2024   Allergen Reaction Noted    Codeine Hallucinations 2017    Amitriptyline Rash 2016    Amoxicillin-pot clavulanate Rash 2016    Aspirin Unknown - Low Severity 2016    Carisoprodol-aspirin-codeine Palpitations 2016    Iodinated contrast media Rash 2016    Latex Rash 2016    Naproxen Rash 2016    Nsaids Unknown - Low Severity 2016    Soma compound with codeine [carisoprodol-aspirin-codeine] Rash 2016    Sulfa antibiotics Rash 2016    Tramadol Palpitations 2019     Social History     Socioeconomic History    Marital status:     Number of children: 10    Years of education: High School   Tobacco Use    Smoking status: Former     Current packs/day: 0.00     Average packs/day: 1.5 packs/day for 12.0 years (18.0 ttl pk-yrs)     Types: Cigarettes     Start date:      Quit date:      Years since quittin.4     Passive exposure: Past    Smokeless tobacco: Never    Tobacco comments:     QUIT SMOKING    Vaping Use    Vaping status: Never Used   Substance and Sexual Activity    Alcohol use: No     Comment: caffeine - decaf coffee    Drug use:  "Never    Sexual activity: Defer     Family History   Problem Relation Age of Onset    Diabetes Mother     Breast cancer Sister     Kidney cancer Sister     Heart disease Sister     Prostate cancer Brother     Prostate cancer Brother     Prostate cancer Brother     Malig Hyperthermia Neg Hx      Review of Systems   Constitutional: Positive for malaise/fatigue. Negative for chills, fever, weight gain and weight loss.   Cardiovascular:  Positive for dyspnea on exertion and leg swelling.   Respiratory:  Negative for cough, snoring and wheezing.    Hematologic/Lymphatic: Negative for bleeding problem. Does not bruise/bleed easily.   Skin:  Negative for color change.   Musculoskeletal:  Negative for falls, joint pain and myalgias.   Gastrointestinal:  Negative for melena.   Genitourinary:  Negative for hematuria.   Neurological:  Negative for excessive daytime sleepiness.   Psychiatric/Behavioral:  Negative for depression. The patient is not nervous/anxious.         Objective:     Vitals:    05/10/24 1006   BP: 118/64   Pulse: 65   Weight: 80.3 kg (177 lb)   Height: 162.6 cm (64\")     Body mass index is 30.38 kg/m².    Vitals reviewed.   Constitutional:       Appearance: Well-developed. Obese.   Eyes:      General: No scleral icterus.        Right eye: No discharge.      Conjunctiva/sclera: Conjunctivae normal.      Pupils: Pupils are equal, round, and reactive to light.   HENT:      Head: Normocephalic.      Nose: Nose normal.   Neck:      Thyroid: No thyromegaly.      Vascular: No JVD.   Pulmonary:      Effort: Pulmonary effort is normal. No respiratory distress.      Breath sounds: Normal breath sounds. No wheezing. No rales.   Cardiovascular:      Normal rate. Regular rhythm. Normal S1. Normal S2.       Murmurs: There is a grade 1/6 systolic murmur at the URSB and ULSB.      No gallop.    Pulses:     Intact distal pulses.      Carotid: 2+ bilaterally.     Radial: 2+ bilaterally.     Femoral: 2+ bilaterally.     " Popliteal: 2+ bilaterally.     Dorsalis pedis: 2+ bilaterally.     Posterior tibial: 2+ bilaterally.  Edema:     Peripheral edema present.     Pretibial: bilateral trace edema of the pretibial area.     Ankle: bilateral trace edema of the ankle.  Abdominal:      General: Bowel sounds are normal. There is no distension.      Palpations: Abdomen is soft.      Tenderness: There is no abdominal tenderness. There is no rebound.   Musculoskeletal: Normal range of motion.         General: No tenderness.      Cervical back: Normal range of motion and neck supple. Skin:     General: Skin is warm and dry.      Findings: No erythema or rash.   Neurological:      Mental Status: Alert and oriented to person, place, and time.   Psychiatric:         Behavior: Behavior normal.         Thought Content: Thought content normal.         Judgment: Judgment normal.       Lab Review:   Lab Results - Last 18 Months   Lab Units 05/18/24  0954 04/27/24  0314 04/23/24  1845 04/22/24  1011 04/15/23  1818 03/13/23  1130   WBC 10*3/mm3 3.24* 3.46   < > 3.4   < > 3.66   RBC 10*6/mm3 3.55* 3.76*   < > 3.84   < > 3.79   HEMOGLOBIN g/dL 12.6 12.9   < > 13.2   < > 12.2   HEMATOCRIT % 37.8 40.0   < > 39.5   < > 36.5   MCV fL 106.5* 106.4*   < > 103*   < > 96.3   MCH pg 35.5* 34.3*   < > 34.4*   < > 32.2   MCHC g/dL 33.3 32.3   < > 33.4   < > 33.4   RDW % 13.2 13.5   < > 13.2   < > 12.3   PLATELETS 10*3/mm3 138* 115*   < > 127*   < > 126*   NEUTROPHIL % %  --  39.8*  --  35   < > 39.0*   LYMPHOCYTE % %  --  45.1  --  55   < > 52.5*   MONOCYTES % %  --  14.2*  --  10   < > 6.6   EOSINOPHIL % %  --  0.3  --  0   < > 1.1   BASOPHIL % %  --  0.3  --  0   < > 0.5   NEUTROS ABS 10*3/mm3 1.39* 1.38*   < > 1.2*   < > 1.43*   LYMPHS ABS 10*3/mm3  --  1.56  --  1.9   < > 1.92   MONOS ABS 10*3/mm3  --  0.49  --  0.3   < > 0.24   EOS ABS 10*3/mm3 0.00 0.01  --  0.0   < > 0.04   BASOS ABS 10*3/mm3 0.00 0.01   < > 0.0   < > 0.02   IMM GRAN % %  --   --   --  0  --   0.3   IMMATURE GRANS (ABS) x10E3/uL  --   --   --  0.0  --  0.01   RDW-SD fl 51.5 53.7   < >  --    < >  --    MPV fL 9.5 9.1   < >  --    < >  --     < > = values in this interval not displayed.       Lab Results - Last 18 Months   Lab Units 05/18/24  1109 05/10/24  1107 04/26/24  1055 04/26/24  0337   GLUCOSE mg/dL 93 96   < > 104*   BUN mg/dL 12 13   < > 19   CREATININE mg/dL 0.87 0.90   < > 1.07*   SODIUM mmol/L 142 140   < > 138   POTASSIUM mmol/L 4.4 4.7   < > 4.1   CHLORIDE mmol/L 107 107   < > 101   CO2 mmol/L 27.0 24.9   < > 29.0   CALCIUM mg/dL 9.8 10.3   < > 9.8   TOTAL PROTEIN g/dL 6.7  --   --  6.3   ALBUMIN g/dL 3.7  --   --  3.6   ALT (SGPT) U/L 9  --   --  11   AST (SGOT) U/L 11  --   --  13   ALK PHOS U/L 64  --   --  56   BILIRUBIN mg/dL 0.9  --   --  1.7*   GLOBULIN gm/dL 3.0  --   --  2.7   A/G RATIO g/dL 1.2  --   --  1.3   BUN / CREAT RATIO  13.8 14.4   < > 17.8   ANION GAP mmol/L 8.0 8.1   < > 8.0   EGFR mL/min/1.73 63.8 61.2   < > 49.8*    < > = values in this interval not displayed.       Lab Results - Last 18 Months   Lab Units 04/25/24 2054 04/23/24  1845   PROBNP pg/mL 535.0 579.0     Lab Results - Last 18 Months   Lab Units 05/18/24  1304 05/18/24  1109   HSTROP T ng/L 23* 23*     Lab Results - Last 18 Months   Lab Units 04/25/24  0725   TSH uIU/mL 0.817         ECG 12 Lead    Date/Time: 5/10/2024 10:23 AM  Performed by: Mary Grace Culp MD    Authorized by: Mary Grace Culp MD  Comparison: compared with previous ECG   Comparison to previous ECG: PVC is present.  Rhythm: atrial fibrillation  Ectopy: unifocal PVCs  Pacing: ventricular paced rhythm  Clinical impression: abnormal EKG            Diagnosis Plan   1. Chronic diastolic congestive heart failure  ECG 12 Lead    Basic Metabolic Panel      2. Aneurysm of ascending aorta without rupture  ECG 12 Lead    CT Chest Without Contrast      3. Essential hypertension        4. Pacemaker        5. Paroxysmal atrial fibrillation        6. Paroxysmal  SVT (supraventricular tachycardia)        7. Pulmonary hypertension          Plan:       1.  Persistent atrial fibrillation with reasonable rate control.  She is felt to be poor candidate for anticoagulation due to history of GI bleed and decreased mobility.  2.  Diastolic CHF.  Today she has dependent edema but does not appear overtly wet.  Will check a BMP today.  She has been hesitant to add SGLT 2 inhibitor with concerns of UTI effects.  If BMP is stable, can consider increase Lasix a little further.  She is also to see Dr. Win in 2 weeks.  3.  Status post permanent pacemaker placement 2018.  Continue device checks.  4.  History of hyperkalemia.  Recheck labs.  5..  Dilated asending aorta.  Ascending aorta measured 3.7 cm by CT 4/2023.  Not check noncontrast CT.    7.  History of RV dysfunction and pulmonary hypertension now with.  Stable on most recent echocardiogram.  4/2024  8.  Untreated HUGO, untreated  9.  Rectal bleeding with hemorroids.  No recent events  10.  Mediastinal adenopathy.  Followed by Dr. Esparza  11.  Debilitated state.  Slowly ambulating around her home.  12.  Thyromegaly with tracheal mass effect.  Additional recommendations per Dr. Esparza    BMP normal with potassium 4.7, creatinine 0.9.  Will have her increase Lasix to 40 mg in a.m. 20 mg in p.m.  She will keep follow-up with Dr. Calabrese in 2-1/2 weeks    Time Spent: I spent 35 minutes caring for Brittany on this date of service. This time includes time spent by me in the following activities: preparing for the visit, reviewing tests, obtaining and/or reviewing a separately obtained history, performing a medically appropriate examination and/or evaluation, counseling and educating the patient/family/caregiver, ordering medications, tests, or procedures, documenting information in the medical record, and independently interpreting results and communicating that information with the patient/family/caregiver.   I spent 1 minutes on the  separately reported service of ECG. This time is not included in the time used to support the E/M service also reported today.        Your medication list            Accurate as of May 10, 2024 11:59 PM. If you have any questions, ask your nurse or doctor.                CHANGE how you take these medications        Instructions Last Dose Given Next Dose Due   furosemide 40 MG tablet  Commonly known as: LASIX  What changed: when to take this      Take 1 tablet by mouth Daily.              CONTINUE taking these medications        Instructions Last Dose Given Next Dose Due   acetaminophen 500 MG tablet  Commonly known as: TYLENOL      Take 1 tablet by mouth Every 6 (Six) Hours As Needed for Mild Pain .       clonazePAM 0.5 MG tablet  Commonly known as: KlonoPIN      Take 1 tablet by mouth 2 (Two) Times a Day As Needed for Anxiety (Grief related anxiety).       lidocaine 5 % ointment  Commonly known as: XYLOCAINE      Apply 1 Application topically to the appropriate area as directed Daily.       Magnesium Oxide -Mg Supplement 400 (240 Mg) MG tablet      Take 1 tablet by mouth Daily.       mesalamine 0.375 g 24 hr capsule  Commonly known as: APRISO      TAKE FOUR CAPSULES BY MOUTH DAILY       pantoprazole 40 MG EC tablet  Commonly known as: PROTONIX      Take 1 tablet by mouth Daily.       potassium chloride 20 MEQ CR tablet  Commonly known as: KLOR-CON M20      Take 1 tablet by mouth Daily.       tacrolimus 0.1 % ointment  Commonly known as: PROTOPIC      Apply 1 Application topically to the appropriate area as directed 2 (Two) Times a Day.       valACYclovir 1000 MG tablet  Commonly known as: VALTREX      Take 1 tablet by mouth Daily.       vitamin B-12 1000 MCG tablet  Commonly known as: CYANOCOBALAMIN      Take 1 tablet by mouth Daily.                Patient is no longer taking -.  I corrected the med list to reflect this.  I did not stop these medications.      Dictated utilizing Dragon dictation

## 2024-05-11 ENCOUNTER — TELEPHONE (OUTPATIENT)
Dept: CARDIOLOGY | Facility: CLINIC | Age: 89
End: 2024-05-11
Payer: MEDICARE

## 2024-05-11 NOTE — TELEPHONE ENCOUNTER
Please let the patient know that it has been a year since the last scan of the heart to check on the aneurysm.  I have therefore ordered a noncontrast CT scan of the chest and radiology should be calling her in the next several days to arrange.  Please let me know if she does not wish to proceed with this

## 2024-05-13 NOTE — TELEPHONE ENCOUNTER
See other telephone encounter.    Thank you,    Nancy HALL, RN  Triage [facility]  05/13/24 08:05 EDT

## 2024-05-18 ENCOUNTER — APPOINTMENT (OUTPATIENT)
Dept: GENERAL RADIOLOGY | Facility: HOSPITAL | Age: 89
End: 2024-05-18
Payer: MEDICARE

## 2024-05-18 ENCOUNTER — HOSPITAL ENCOUNTER (EMERGENCY)
Facility: HOSPITAL | Age: 89
Discharge: HOME | End: 2024-05-18
Attending: EMERGENCY MEDICINE
Payer: MEDICARE

## 2024-05-18 ENCOUNTER — APPOINTMENT (OUTPATIENT)
Dept: CT IMAGING | Facility: HOSPITAL | Age: 89
End: 2024-05-18
Payer: MEDICARE

## 2024-05-18 VITALS
HEART RATE: 61 BPM | OXYGEN SATURATION: 98 % | SYSTOLIC BLOOD PRESSURE: 132 MMHG | TEMPERATURE: 98.1 F | RESPIRATION RATE: 18 BRPM | BODY MASS INDEX: 31.07 KG/M2 | DIASTOLIC BLOOD PRESSURE: 73 MMHG | HEIGHT: 64 IN | WEIGHT: 182 LBS

## 2024-05-18 DIAGNOSIS — I27.20 PULMONARY HYPERTENSION: ICD-10-CM

## 2024-05-18 DIAGNOSIS — I50.32 CHRONIC DIASTOLIC CONGESTIVE HEART FAILURE: ICD-10-CM

## 2024-05-18 DIAGNOSIS — R07.9 CHEST PAIN, UNSPECIFIED TYPE: Primary | ICD-10-CM

## 2024-05-18 DIAGNOSIS — I71.21 ANEURYSM OF ASCENDING AORTA WITHOUT RUPTURE: Chronic | ICD-10-CM

## 2024-05-18 DIAGNOSIS — I10 ESSENTIAL HYPERTENSION: Chronic | ICD-10-CM

## 2024-05-18 LAB
ALBUMIN SERPL-MCNC: 3.7 G/DL (ref 3.5–5.2)
ALBUMIN/GLOB SERPL: 1.2 G/DL
ALP SERPL-CCNC: 64 U/L (ref 39–117)
ALT SERPL W P-5'-P-CCNC: 9 U/L (ref 1–33)
ANION GAP SERPL CALCULATED.3IONS-SCNC: 8 MMOL/L (ref 5–15)
AST SERPL-CCNC: 11 U/L (ref 1–32)
BASOPHILS # BLD MANUAL: 0 10*3/MM3 (ref 0–0.2)
BASOPHILS NFR BLD MANUAL: 0 % (ref 0–1.5)
BILIRUB SERPL-MCNC: 0.9 MG/DL (ref 0–1.2)
BUN SERPL-MCNC: 12 MG/DL (ref 8–23)
BUN/CREAT SERPL: 13.8 (ref 7–25)
CALCIUM SPEC-SCNC: 9.8 MG/DL (ref 8.6–10.5)
CHLORIDE SERPL-SCNC: 107 MMOL/L (ref 98–107)
CO2 SERPL-SCNC: 27 MMOL/L (ref 22–29)
CREAT SERPL-MCNC: 0.87 MG/DL (ref 0.57–1)
D DIMER PPP FEU-MCNC: 2.69 MCGFEU/ML (ref 0–0.89)
DEPRECATED RDW RBC AUTO: 51.5 FL (ref 37–54)
EGFRCR SERPLBLD CKD-EPI 2021: 63.8 ML/MIN/1.73
EOSINOPHIL # BLD MANUAL: 0 10*3/MM3 (ref 0–0.4)
EOSINOPHIL NFR BLD MANUAL: 0 % (ref 0.3–6.2)
ERYTHROCYTE [DISTWIDTH] IN BLOOD BY AUTOMATED COUNT: 13.2 % (ref 12.3–15.4)
GEN 5 2HR TROPONIN T REFLEX: 23 NG/L
GLOBULIN UR ELPH-MCNC: 3 GM/DL
GLUCOSE SERPL-MCNC: 93 MG/DL (ref 65–99)
HCT VFR BLD AUTO: 37.8 % (ref 34–46.6)
HGB BLD-MCNC: 12.6 G/DL (ref 12–15.9)
INR PPP: 1.18 (ref 0.9–1.1)
LYMPHOCYTES # BLD MANUAL: 1.43 10*3/MM3 (ref 0.7–3.1)
LYMPHOCYTES NFR BLD MANUAL: 13 % (ref 5–12)
MCH RBC QN AUTO: 35.5 PG (ref 26.6–33)
MCHC RBC AUTO-ENTMCNC: 33.3 G/DL (ref 31.5–35.7)
MCV RBC AUTO: 106.5 FL (ref 79–97)
MONOCYTES # BLD: 0.42 10*3/MM3 (ref 0.1–0.9)
NEUTROPHILS # BLD AUTO: 1.39 10*3/MM3 (ref 1.7–7)
NEUTROPHILS NFR BLD MANUAL: 43 % (ref 42.7–76)
NRBC BLD AUTO-RTO: 0 /100 WBC (ref 0–0.2)
PLAT MORPH BLD: NORMAL
PLATELET # BLD AUTO: 138 10*3/MM3 (ref 140–450)
PMV BLD AUTO: 9.5 FL (ref 6–12)
POIKILOCYTOSIS BLD QL SMEAR: ABNORMAL
POTASSIUM SERPL-SCNC: 4.4 MMOL/L (ref 3.5–5.2)
PROT SERPL-MCNC: 6.7 G/DL (ref 6–8.5)
PROTHROMBIN TIME: 15.2 SECONDS (ref 11.7–14.2)
QT INTERVAL: 485 MS
QTC INTERVAL: 485 MS
RBC # BLD AUTO: 3.55 10*6/MM3 (ref 3.77–5.28)
SODIUM SERPL-SCNC: 142 MMOL/L (ref 136–145)
TROPONIN T DELTA: 0 NG/L
TROPONIN T SERPL HS-MCNC: 23 NG/L
VARIANT LYMPHS NFR BLD MANUAL: 44 % (ref 19.6–45.3)
WBC MORPH BLD: NORMAL
WBC NRBC COR # BLD AUTO: 3.24 10*3/MM3 (ref 3.4–10.8)

## 2024-05-18 PROCEDURE — 25510000001 IOPAMIDOL PER 1 ML: Performed by: EMERGENCY MEDICINE

## 2024-05-18 PROCEDURE — 99285 EMERGENCY DEPT VISIT HI MDM: CPT

## 2024-05-18 PROCEDURE — 84484 ASSAY OF TROPONIN QUANT: CPT | Performed by: EMERGENCY MEDICINE

## 2024-05-18 PROCEDURE — 85610 PROTHROMBIN TIME: CPT | Performed by: EMERGENCY MEDICINE

## 2024-05-18 PROCEDURE — 71275 CT ANGIOGRAPHY CHEST: CPT

## 2024-05-18 PROCEDURE — 85379 FIBRIN DEGRADATION QUANT: CPT | Performed by: EMERGENCY MEDICINE

## 2024-05-18 PROCEDURE — 36415 COLL VENOUS BLD VENIPUNCTURE: CPT | Performed by: EMERGENCY MEDICINE

## 2024-05-18 PROCEDURE — 80053 COMPREHEN METABOLIC PANEL: CPT | Performed by: EMERGENCY MEDICINE

## 2024-05-18 PROCEDURE — 71045 X-RAY EXAM CHEST 1 VIEW: CPT

## 2024-05-18 PROCEDURE — 25010000002 DIPHENHYDRAMINE PER 50 MG: Performed by: EMERGENCY MEDICINE

## 2024-05-18 PROCEDURE — 93005 ELECTROCARDIOGRAM TRACING: CPT | Performed by: EMERGENCY MEDICINE

## 2024-05-18 PROCEDURE — 85007 BL SMEAR W/DIFF WBC COUNT: CPT | Performed by: EMERGENCY MEDICINE

## 2024-05-18 PROCEDURE — 85025 COMPLETE CBC W/AUTO DIFF WBC: CPT | Performed by: EMERGENCY MEDICINE

## 2024-05-18 PROCEDURE — 99291 CRITICAL CARE FIRST HOUR: CPT

## 2024-05-18 PROCEDURE — 96374 THER/PROPH/DIAG INJ IV PUSH: CPT

## 2024-05-18 RX ORDER — DIPHENHYDRAMINE HYDROCHLORIDE 50 MG/ML
25 INJECTION INTRAMUSCULAR; INTRAVENOUS ONCE
Status: COMPLETED | OUTPATIENT
Start: 2024-05-18 | End: 2024-05-18

## 2024-05-18 RX ADMIN — IOPAMIDOL 95 ML: 755 INJECTION, SOLUTION INTRAVENOUS at 13:39

## 2024-05-18 RX ADMIN — DIPHENHYDRAMINE HYDROCHLORIDE 25 MG: 50 INJECTION, SOLUTION INTRAMUSCULAR; INTRAVENOUS at 12:09

## 2024-05-18 NOTE — ED NOTES
Pt states she takes her tylenol every morning and at night at bed time. Pt is aware they are as needed, but insists on taking them every morning and at bed time.

## 2024-05-18 NOTE — ED PROVIDER NOTES
EMERGENCY DEPARTMENT ENCOUNTER  Room Number:  09/09  PCP: Mega Esparza MD  Independent Historians: Patient, EMS who brought patient      HPI:  Chief Complaint: had concerns including Chest Pain.     A complete HPI/ROS/PMH/PSH/SH/FH are unobtainable due to:   Chronic or social conditions impacting patient care (Social Determinants of Health):       Context: Brittany Villeda is a 89 y.o. female with a medical history of pulmonary hypertension, CHF, cardiac arrhythmia who presents to the ED c/o acute chest pain.  Pain began this morning about 1 or 2 hours ago.  Pain is sharp in the left chest and worsened with movement or deep breath.  Pain is currently moderate was more severe earlier.  Patient denies history of similar pains.  Denies increased shortness of breath.  Denies fall or injury.      Review of prior external notes (non-ED) -and- Review of prior external test results outside of this encounter:   I reviewed prior medical records including most recent cardiology office note with Dr. Jonh Culp.  Patient with history of pulmonary hypertension, chronic CHF, A-fib status post pacemaker.  Patient not an anticoagulation candidate secondary to GI bleed and fall risk.      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    MGUS (monoclonal gammopathy of unknown significance) 09/16/2016    Ulcerative rectosigmoiditis without complication 11/30/2017    Lichen sclerosus 08/23/2017    Heart block 10/30/2018    Sleep-related hypoxia 12/22/2018     Bradycardia 12/29/2018    History of atrial fibrillation 03/19/2019    Pacemaker 03/19/2019    Paroxysmal SVT (supraventricular tachycardia) 05/08/2019    History of chest pain 05/08/2019    Palpitations 05/08/2019    Paroxysmal atrial fibrillation 10/06/2019    Ascending aortic aneurysm 08/24/2020    Mediastinal lymphadenopathy 09/09/2020    Anemia     Obesity (BMI 30-39.9)     Generalized weakness 04/08/2021    Dysautonomia 04/09/2021    Hypercalcemia 04/11/2021    Hyperparathyroidism 04/28/2021    Choledocholithiasis 05/09/2021    Duodenal ulcer 05/13/2021    Hemorrhagic gastritis 05/13/2021    Exertional dyspnea 06/22/2021    COPD (chronic obstructive pulmonary disease)     Osteoarthritis of glenohumeral joint 07/12/2018    ICH (intracerebral hemorrhage) 08/05/2021    Lower GI bleed 11/01/2022    Thrombocytopenia 11/01/2022    Lichen sclerosus of female genitalia 03/13/2023    Senile atrophic vaginitis 03/13/2023    Left inguinal hernia 08/23/2023    Vulvar pain 02/01/2024    Bilateral lower extremity edema 04/24/2024    Chronic diastolic congestive heart failure 04/24/2024    Acute renal insufficiency 04/25/2024     Resolved Ambulatory Problems     Diagnosis Date Noted    Abnormal weight loss 05/12/2016    Tachycardia 05/12/2016    Sciatica 05/12/2016    Nausea and vomiting 05/12/2016    Hyperthyroidism 05/12/2016    Fatigue 05/12/2016    Dizziness 05/12/2016    Diarrhea 05/12/2016    Abnormal thyroid stimulating hormone (TSH) level 05/12/2016    Abdominal pain 05/12/2016    Abnormal EKG 06/30/2016    Leukopenia 09/12/2016    Other chest pain 12/24/2017    Shoulder arthritis 01/28/2019    Rectal bleeding 10/15/2019    Arthritis 12/13/2019    Immobility 11/20/2020    Left knee pain 11/20/2020    Chronic anticoagulation 11/20/2020    Hemarthrosis of left knee 11/20/2020    Acute UTI (urinary tract infection) 04/08/2021    Weakness of both lower extremities 04/09/2021    Spondylosis of lumbosacral region without  myelopathy or radiculopathy 04/09/2021    Macrocytosis 04/11/2021    Arthritis of right wrist 04/13/2021    Hypomagnesemia 04/13/2021    Essential hypertension 05/10/2021    Hyperglycemia 05/10/2021    Epigastric pain 05/12/2021    Functional quadriplegia 11/20/2020    Hip pain 04/12/2018    Other malaise and fatigue 05/25/2021    Shoulder pain 04/12/2018    Subdural hematoma 08/05/2021    Melena 09/29/2022    GI bleed 11/01/2022    Hypokalemia 04/26/2024     Past Medical History:   Diagnosis Date    Abdominal aortic aneurysm     Arrhythmia     Asthma     Colitis     GERD (gastroesophageal reflux disease)     Hypertension     Hypertensive heart disease     Inguinal hernia     Kidney stone     Nephrolithiasis     Peptic ulceration     Pressure ulcer     PSVT (paroxysmal supraventricular tachycardia)     Rectal bleed     Sleep apnea     Systemic hypertension     Ventricular tachycardia          PAST SURGICAL HISTORY  Past Surgical History:   Procedure Laterality Date    BACK SURGERY      lumbar fusion    DUSTY HOLE Left 08/08/2021    Procedure: Left-sided dusty holes for evacuation of subdural hematoma;  Surgeon: Gustavo Callaway MD;  Location: [facility] MAIN OR;  Service: Neurosurgery;  Laterality: Left;    CARDIAC ELECTROPHYSIOLOGY PROCEDURE N/A 11/07/2018    Procedure: Pacemaker DC new   [place];  Surgeon: Jesus Granda MD;  Location: [facility] CATH INVASIVE LOCATION;  Service: Cardiology    CHOLECYSTECTOMY      COLONOSCOPY  06/16/2014    colitis, cryptitis,  tics, NBIH, TA w/low grade dysplasia    COLONOSCOPY N/A 10/28/2019    Procedure: COLONOSCOPY WITH COLD AND HOT POLYPECTOMIES;  Surgeon: Micaela English MD;  Location: [facility] ENDOSCOPY;  Service: Gastroenterology    ENDOSCOPY N/A 05/13/2021    Procedure: ESOPHAGOGASTRODUODENOSCOPY with BX;  Surgeon: Stefan Mcfadden MD;  Location: [facility] ENDOSCOPY;  Service: Gastroenterology;  Laterality: N/A;  EPIGASTRIC PAIN  --DUODENAL ULCER, HEMORRHAGIC GASTRITIS,  HIATAL HERNIA     ENDOSCOPY N/A 10/11/2022    Procedure: ESOPHAGOGASTRODUODENOSCOPY;  Surgeon: Micaela English MD;  Location:  [facility] ENDOSCOPY;  Service: Gastroenterology;  Laterality: N/A;  PRE- MELENA  POST- ESOPHAGITIS    HEMORRHOIDECTOMY      HERNIA REPAIR      umbilical    HYSTERECTOMY      INGUINAL HERNIA REPAIR Left 2023    Procedure: INGUINAL HERNIA REPAIR LEFT;  Surgeon: Antonio Foster MD;  Location:  [facility] OR [facility];  Service: General;  Laterality: Left;    JOINT REPLACEMENT Bilateral     KNEES    MYOMECTOMY      PACEMAKER IMPLANTATION      SHOULDER SURGERY      SIGMOIDOSCOPY N/A 2022    Procedure: SIGMOIDOSCOPY FLEXIBLE WITH COLD BIOPSIES AND ASPIRATE;  Surgeon: Micaela English MD;  Location:  [facility] ENDOSCOPY;  Service: Gastroenterology;  Laterality: N/A;  PRE- RECTAL BLEEDING  POST- HEMORRHOIDS, DIVERTICULOSIS, COLITIS    SINUS SURGERY      TOE NAIL AMPUTATION  2019    TONSILLECTOMY           FAMILY HISTORY  Family History   Problem Relation Age of Onset    Diabetes Mother     Breast cancer Sister     Kidney cancer Sister     Heart disease Sister     Prostate cancer Brother     Prostate cancer Brother     Prostate cancer Brother     Malig Hyperthermia Neg Hx          SOCIAL HISTORY  Social History     Socioeconomic History    Marital status:     Number of children: 10    Years of education: High School   Tobacco Use    Smoking status: Former     Current packs/day: 0.00     Average packs/day: 1.5 packs/day for 12.0 years (18.0 ttl pk-yrs)     Types: Cigarettes     Start date:      Quit date:      Years since quittin.4     Passive exposure: Past    Smokeless tobacco: Never    Tobacco comments:     QUIT SMOKING    Vaping Use    Vaping status: Never Used   Substance and Sexual Activity    Alcohol use: No     Comment: caffeine - decaf coffee    Drug use: Never    Sexual activity: Defer         ALLERGIES  Codeine, Amitriptyline, Amoxicillin-pot clavulanate,  Aspirin, Carisoprodol-aspirin-codeine, Iodinated contrast media, Latex, Naproxen, Nsaids, Soma compound with codeine [carisoprodol-aspirin-codeine], Sulfa antibiotics, and Tramadol      REVIEW OF SYSTEMS  Review of Systems   Constitutional:  Positive for fatigue. Negative for fever.   Respiratory:  Negative for shortness of breath.    Cardiovascular:  Positive for chest pain (As per HPI) and leg swelling (Chronic, unchanged from baseline).   All other systems reviewed and are negative.    Included in HPI  All systems reviewed and negative except for those discussed in HPI.      PHYSICAL EXAM    I have reviewed the triage vital signs and nursing notes.    ED Triage Vitals [05/18/24 0938]   Temp Heart Rate Resp BP SpO2   98.1 °F (36.7 °C) 62 16 122/72 100 %      Temp src Heart Rate Source Patient Position BP Location FiO2 (%)   -- -- -- -- --       Physical Exam  GENERAL: Alert and pleasant female in no obvious distress.  She is somewhat hard of hearing but answers questions readily and seems to be sharp of mine.  Triage vitals notable for O2 sats 100% on room air.  Temperature, heart rate and blood pressure are benign.  SKIN: Warm, dry  HENT: Normocephalic, atraumatic  EYES: no scleral icterus  CV: regular rhythm, regular rate-no murmur  RESPIRATORY: normal effort, lungs clear-O2 sats 100% room air  ABDOMEN: soft, nontender, nondistended  MUSCULOSKELETAL: Mild to moderate chronic appearing edema of bilateral lower extremities  NEURO: alert, moves all extremities, follows commands      LAB RESULTS  Recent Results (from the past 24 hour(s))   Protime-INR    Collection Time: 05/18/24  9:54 AM    Specimen: Blood   Result Value Ref Range    Protime 15.2 (H) 11.7 - 14.2 Seconds    INR 1.18 (H) 0.90 - 1.10   D-dimer, Quantitative    Collection Time: 05/18/24  9:54 AM    Specimen: Blood   Result Value Ref Range    D-Dimer, Quantitative 2.69 (H) 0.00 - 0.89 MCGFEU/mL   CBC Auto Differential    Collection Time: 05/18/24  9:54  AM    Specimen: Blood   Result Value Ref Range    WBC 3.24 (L) 3.40 - 10.80 10*3/mm3    RBC 3.55 (L) 3.77 - 5.28 10*6/mm3    Hemoglobin 12.6 12.0 - 15.9 g/dL    Hematocrit 37.8 34.0 - 46.6 %    .5 (H) 79.0 - 97.0 fL    MCH 35.5 (H) 26.6 - 33.0 pg    MCHC 33.3 31.5 - 35.7 g/dL    RDW 13.2 12.3 - 15.4 %    RDW-SD 51.5 37.0 - 54.0 fl    MPV 9.5 6.0 - 12.0 fL    Platelets 138 (L) 140 - 450 10*3/mm3    nRBC 0.0 0.0 - 0.2 /100 WBC   Manual Differential    Collection Time: 05/18/24  9:54 AM    Specimen: Blood   Result Value Ref Range    Neutrophil % 43.0 42.7 - 76.0 %    Lymphocyte % 44.0 19.6 - 45.3 %    Monocyte % 13.0 (H) 5.0 - 12.0 %    Eosinophil % 0.0 (L) 0.3 - 6.2 %    Basophil % 0.0 0.0 - 1.5 %    Neutrophils Absolute 1.39 (L) 1.70 - 7.00 10*3/mm3    Lymphocytes Absolute 1.43 0.70 - 3.10 10*3/mm3    Monocytes Absolute 0.42 0.10 - 0.90 10*3/mm3    Eosinophils Absolute 0.00 0.00 - 0.40 10*3/mm3    Basophils Absolute 0.00 0.00 - 0.20 10*3/mm3    Poikilocytes Mod/2+ None Seen    WBC Morphology Normal Normal    Platelet Morphology Normal Normal   ECG 12 Lead Chest Pain    Collection Time: 05/18/24  9:58 AM   Result Value Ref Range    QT Interval 485 ms    QTC Interval 485 ms   High Sensitivity Troponin T    Collection Time: 05/18/24 11:09 AM    Specimen: Blood   Result Value Ref Range    HS Troponin T 23 (H) <14 ng/L   Comprehensive Metabolic Panel    Collection Time: 05/18/24 11:09 AM    Specimen: Blood   Result Value Ref Range    Glucose 93 65 - 99 mg/dL    BUN 12 8 - 23 mg/dL    Creatinine 0.87 0.57 - 1.00 mg/dL    Sodium 142 136 - 145 mmol/L    Potassium 4.4 3.5 - 5.2 mmol/L    Chloride 107 98 - 107 mmol/L    CO2 27.0 22.0 - 29.0 mmol/L    Calcium 9.8 8.6 - 10.5 mg/dL    Total Protein 6.7 6.0 - 8.5 g/dL    Albumin 3.7 3.5 - 5.2 g/dL    ALT (SGPT) 9 1 - 33 U/L    AST (SGOT) 11 1 - 32 U/L    Alkaline Phosphatase 64 39 - 117 U/L    Total Bilirubin 0.9 0.0 - 1.2 mg/dL    Globulin 3.0 gm/dL    A/G Ratio 1.2 g/dL     BUN/Creatinine Ratio 13.8 7.0 - 25.0    Anion Gap 8.0 5.0 - 15.0 mmol/L    eGFR 63.8 >60.0 mL/min/1.73   High Sensitivity Troponin T 2Hr    Collection Time: 05/18/24  1:04 PM    Specimen: Arm, Right; Blood   Result Value Ref Range    HS Troponin T 23 (H) <14 ng/L    Troponin T Delta 0 >=-4 - <+4 ng/L         RADIOLOGY  CT Angiogram Chest    Result Date: 5/18/2024  CT ANGIOGRAM OF THE CHEST. MULTIPLE CORONAL, SAGITTAL, AND 3-D RECONSTRUCTIONS.  HISTORY: 89-year-old female with chest pain and elevated D-dimer.  TECHNIQUE: Radiation dose reduction techniques were utilized, including automated exposure control and exposure modulation based on body size. CT angiogram of the chest was performed following the administration of IV contrast. Multiple coronal, sagittal, and 3-D reconstruction images were obtained. Compared with chest CTA 04/15/2023.  FINDINGS: 1. There is no convincing evidence for pulmonary thromboemboli. Again seen is heterogeneous admixture of contrast of the pulmonary arteries at the right lower lobe, but no pulmonary thromboemboli are seen. Pulmonary arteries are enlarged with the main pulmonary artery measuring 3.8 cm in diameter, pulmonary arterial hypertension.  The thoracic aorta is ectatic at 4.0 cm, stable when measured along the same planes. The diameter tapers to 3 cm at the aortic arch.  2. Stable marked elevation of the right hemidiaphragm with significant compressive atelectasis at the right lower lobe. No new airspace consolidations are seen suspicious for pneumonia. There are no pleural or pericardial effusions. There is cardiomegaly and findings of right-sided cardiac insufficiency, unchanged.  3. Enlarged mediastinal nodes are stable and extensive multinodular goiter appears stable, although not seen in its entirety. No new abnormality seen at the visualized upper abdomen, multiple renal cysts are noted.      XR Chest 1 View    Result Date: 5/18/2024  XR CHEST 1 VW-  HISTORY: Female who  is 89 years-old, chest pain  TECHNIQUE: Frontal view of the chest  COMPARISON: 4/25/2024  FINDINGS: The heart size is borderline. Left-sided pacemaker, cardiac leads are seen. Pulmonary vasculature is mildly congested. No focal pulmonary consolidation, pleural effusion, or pneumothorax. Right hemidiaphragm remains elevated. No acute osseous process.      No focal pulmonary consolidation. Borderline heart size with mild pulmonary vascular congestion. Follow-up as clinical indications persist.  This report was finalized on 5/18/2024 10:11 AM by Dr. Nas Maharaj M.D on Workstation: [id number]         MEDICATIONS GIVEN IN ER  Medications   diphenhydrAMINE (BENADRYL) injection 25 mg (25 mg Intravenous Given 5/18/24 1209)   iopamidol (ISOVUE-370) 76 % injection 100 mL (95 mL Intravenous Given by Other 5/18/24 9661)         ORDERS PLACED DURING THIS VISIT:  Orders Placed This Encounter   Procedures    Critical Care    XR Chest 1 View    CT Angiogram Chest    High Sensitivity Troponin T    Comprehensive Metabolic Panel    Protime-INR    D-dimer, Quantitative    CBC Auto Differential    Manual Differential    High Sensitivity Troponin T 2Hr    ECG 12 Lead Chest Pain    CBC & Differential         OUTPATIENT MEDICATION MANAGEMENT:  No current Epic-ordered facility-administered medications on file.     Current Outpatient Medications Ordered in Epic   Medication Sig Dispense Refill    clonazePAM (KlonoPIN) 0.5 MG tablet Take 1 tablet by mouth 2 (Two) Times a Day As Needed for Anxiety (Grief related anxiety). 10 tablet 0    furosemide (LASIX) 40 MG tablet Take 1 tablet by mouth Daily. (Patient taking differently: Take 1 tablet by mouth 3 (Three) Times a Week.) 30 tablet 0    lidocaine (XYLOCAINE) 5 % ointment Apply 1 Application topically to the appropriate area as directed Daily.      Magnesium Oxide -Mg Supplement 400 (240 Mg) MG tablet Take 1 tablet by mouth Daily. 90 tablet 1    mesalamine (APRISO) 0.375 g 24 hr capsule  TAKE FOUR CAPSULES BY MOUTH DAILY 360 capsule 0    pantoprazole (PROTONIX) 40 MG EC tablet Take 1 tablet by mouth Daily. 90 tablet 1    potassium chloride (KLOR-CON M20) 20 MEQ CR tablet Take 1 tablet by mouth Daily. 30 tablet 0    tacrolimus (PROTOPIC) 0.1 % ointment Apply 1 Application topically to the appropriate area as directed 2 (Two) Times a Day.      valACYclovir (VALTREX) 1000 MG tablet Take 1 tablet by mouth Daily.      vitamin B-12 (CYANOCOBALAMIN) 1000 MCG tablet Take 1 tablet by mouth Daily. 90 tablet 1    acetaminophen (TYLENOL) 500 MG tablet Take 1 tablet by mouth Every 6 (Six) Hours As Needed for Mild Pain .           PROCEDURES  Critical Care    Performed by: Luc Begum MD  Authorized by: Luc Begum MD    Critical care provider statement:     Critical care time (minutes):  37    Critical care time was exclusive of:  Separately billable procedures and treating other patients    Critical care was necessary to treat or prevent imminent or life-threatening deterioration of the following conditions: Chest pain with consideration of acute coronary syndrome, arrhythmia, dissection, pulmonary embolism.    Critical care was time spent personally by me on the following activities:  Evaluation of patient's response to treatment, discussions with primary provider, discussions with consultants, development of treatment plan with patient or surrogate, ordering and review of radiographic studies, ordering and review of laboratory studies, pulse oximetry, re-evaluation of patient's condition, review of old charts, obtaining history from patient or surrogate and examination of patient              PROGRESS, DATA ANALYSIS, CONSULTS, AND MEDICAL DECISION MAKING  All labs have been independently interpreted by me.  All radiology studies have been reviewed by me. All EKG's have been independently viewed and interpreted by me.  Discussion below represents my analysis of pertinent findings related to patient's  "condition, differential diagnosis, treatment plan and final disposition.    Differential diagnosis includes but is not limited to acute coronary syndrome, musculoskeletal pain, GI related pain, pulmonary embolism, aortic dissection.      ED Course as of 05/18/24 1429   Sat May 18, 2024   1005 EKG independently interpreted by me    Time 9:58 AM  Rhythm-ventricular paced 60  P waves-not noted, underlying A-fib  QRS-ventricular paced with IVCD  ST, T waves-nonspecific changes  Not significant change compared to 4/2024 [DB]   1018 Chest x-ray independently interpreted by me shows cardiomegaly, no active disease.    Official reading by Dr. Maharaj is in agreement. [DB]   1044 D-dimer is pretty significantly elevated at 2.69 and could go along with pulmonary embolism or aortic dissection.  Will consider CT angiogram of the chest for further evaluation. [DB]   1045 CBC notable for some chronic neutropenia with a white blood count of 3.24 which is near baseline.  Hemoglobin is normal.  There is mild chronic thrombocytopenia with platelet count of 138. [DB]   1201 Chemistries are benign without hepatic or renal failure. [DB]   1201 High-sensitivity troponin of 23 is similar to prior values and represents likely underlying chronic coronary disease rather than acute coronary syndrome. [DB]   1201 At this point my concern is for pulmonary embolism or possible aortic dissection will get CT angiogram of the chest. [DB]   1205 Feeling better and the pain is lessened.  She complains of occasional \"palpitations\" although she has been in a paced rhythm with a pulse in the 60s throughout much of the ED course.    Will get CT angiogram to help rule out dissection or pulmonary embolism. [DB]   1339 High-sensitivity troponin is stable at 23 and would go against acute coronary syndrome. [DB]   1340 CT scan of the chest independently interpreted by me.  I do not see any obvious pulmonary embolism in the large pulmonary arteries. [DB] "   1424 Official reading of CT angiogram does not show any pulmonary embolism.  Dilation of ascending aorta is stable and not significant change.    I do not see any fractures or pneumonia or other serious causes of chest related pain. [DB]   1424 At this point with stable troponin and reassuring CT angiogram I think her most likely dealing with chest wall pain and would likely just treat supportively.  I do not think this is acute coronary syndrome or pulmonary embolism  or aortic dissection. [DB]      ED Course User Index  [DB] Luc Begum MD             AS OF 14:29 EDT VITALS:    BP - 134/70  HR - 60  TEMP - 98.1 °F (36.7 °C)  O2 SATS - 100%    COMPLEXITY OF CARE  Complicated patient with multiple medical problems including pulmonary hypertension, cardiac arrhythmia status post pacer and known ascending aortic aneurysm presents with chest pain that began this morning.  Pain is sharp and worse with some movements.    Please see ED workup above for my differential diagnosis and independent rotation of ED testing to include EKG, x-ray, CT angiogram of chest and labs.    Ultimately I do not think were dealing with serious acute condition and are comfortable with discharge home.  Patient and daughters are comfortable with this plan and we discussed testing and disposition prior to discharge.      DIAGNOSIS  Final diagnoses:   Chest pain, unspecified type   Essential hypertension   Pulmonary hypertension   Chronic diastolic congestive heart failure   Aneurysm of ascending aorta without rupture         DISPOSITION  ED Disposition       ED Disposition   Discharge    Condition   Stable    Comment   --                Please note that portions of this document were completed with a voice recognition program.    Note Disclaimer: At [facility], we believe that sharing information builds trust and better relationships. You are receiving this note because you recently visited [facility]. It is possible you will see  health information before a provider has talked with you about it. This kind of information can be easy to misunderstand. To help you fully understand what it means for your health, we urge you to discuss this note with your provider.         Luc Begum MD  05/18/24 3590

## 2024-05-18 NOTE — ED NOTES
Pt to ED from home due to L sided CP with breathing (inhaling only). Denies any new illness, denies recent falls or injury. Pt uses walker to ambulate at home but states generalized leg swelling and weakness.   Pt is alert and oriented.

## 2024-05-18 NOTE — DISCHARGE INSTRUCTIONS
CAT scan did not show evidence of blood clots or enlargement of your aortic aneurysm.  Blood testing suggest that this was not related to heart attack or heart blockage.    Cause of pain is unclear but may be related to arthritis or musculoskeletal pain.  Do not hesitate to return for worsening symptoms or as needed.

## 2024-05-29 ENCOUNTER — LAB (OUTPATIENT)
Dept: LAB | Facility: HOSPITAL | Age: 89
End: 2024-05-29
Payer: MEDICARE

## 2024-05-29 ENCOUNTER — HOSPITAL ENCOUNTER (OUTPATIENT)
Dept: CARDIOLOGY | Facility: HOSPITAL | Age: 89
Discharge: HOME | End: 2024-05-29
Payer: MEDICARE

## 2024-05-29 VITALS
OXYGEN SATURATION: 97 % | HEART RATE: 63 BPM | DIASTOLIC BLOOD PRESSURE: 63 MMHG | BODY MASS INDEX: 31.21 KG/M2 | SYSTOLIC BLOOD PRESSURE: 108 MMHG | HEIGHT: 64 IN | WEIGHT: 182.8 LBS

## 2024-05-29 DIAGNOSIS — I27.20 PULMONARY HYPERTENSION: Primary | ICD-10-CM

## 2024-05-29 DIAGNOSIS — I51.9 DYSFUNCTION OF RIGHT CARDIAC VENTRICLE: ICD-10-CM

## 2024-05-29 DIAGNOSIS — I50.32 CHRONIC HEART FAILURE WITH PRESERVED EJECTION FRACTION: ICD-10-CM

## 2024-05-29 DIAGNOSIS — I27.20 PULMONARY HYPERTENSION: ICD-10-CM

## 2024-05-29 LAB
ANION GAP SERPL CALCULATED.3IONS-SCNC: 8.9 MMOL/L (ref 5–15)
BUN SERPL-MCNC: 20 MG/DL (ref 8–23)
BUN/CREAT SERPL: 17.5 (ref 7–25)
CALCIUM SPEC-SCNC: 10.4 MG/DL (ref 8.6–10.5)
CHLORIDE SERPL-SCNC: 102 MMOL/L (ref 98–107)
CO2 SERPL-SCNC: 26.1 MMOL/L (ref 22–29)
CREAT SERPL-MCNC: 1.14 MG/DL (ref 0.57–1)
EGFRCR SERPLBLD CKD-EPI 2021: 46.1 ML/MIN/1.73
GLUCOSE SERPL-MCNC: 107 MG/DL (ref 65–99)
POTASSIUM SERPL-SCNC: 5.6 MMOL/L (ref 3.5–5.2)
SODIUM SERPL-SCNC: 137 MMOL/L (ref 136–145)

## 2024-05-29 PROCEDURE — 36415 COLL VENOUS BLD VENIPUNCTURE: CPT | Performed by: INTERNAL MEDICINE

## 2024-05-29 PROCEDURE — 83521 IG LIGHT CHAINS FREE EACH: CPT | Performed by: INTERNAL MEDICINE

## 2024-05-29 PROCEDURE — 82784 ASSAY IGA/IGD/IGG/IGM EACH: CPT | Performed by: INTERNAL MEDICINE

## 2024-05-29 PROCEDURE — 84155 ASSAY OF PROTEIN SERUM: CPT | Performed by: INTERNAL MEDICINE

## 2024-05-29 PROCEDURE — 94618 PULMONARY STRESS TESTING: CPT

## 2024-05-29 PROCEDURE — 86334 IMMUNOFIX E-PHORESIS SERUM: CPT | Performed by: INTERNAL MEDICINE

## 2024-05-29 PROCEDURE — 80048 BASIC METABOLIC PNL TOTAL CA: CPT

## 2024-05-29 PROCEDURE — 84165 PROTEIN E-PHORESIS SERUM: CPT | Performed by: INTERNAL MEDICINE

## 2024-05-29 RX ORDER — SPIRONOLACTONE 25 MG/1
12.5 TABLET ORAL DAILY
Qty: 30 TABLET | Refills: 0 | Status: SHIPPED | OUTPATIENT
Start: 2024-05-29

## 2024-05-29 NOTE — LETTER
May 29, 2024       No Recipients    Patient: Brittany Villeda   YOB: 1935   Date of Visit: 5/29/2024     Dear Mega Esparza MD:       Thank you for referring Brittany Villeda to me for evaluation. Below are the relevant portions of my assessment and plan of care.    If you have questions, please do not hesitate to call me. I look forward to following Brittany along with you.         Sincerely,        Mark Win MD PhD        CC:   No Recipients    Mark Win MD PhD  05/29/24 1356  Signed  Heart Failure & Pulmonary Arterial Hypertension Clinic  [facility]  Mark Win M.D., Ph.D., [facility]       Mary Grace Culp MD  [address] [address]    Thank you for asking me to see Brittany Villeda.     History of Present Illness    1. PAH  2. HFpEF    Subjective     Brittany Cliftons a 89 y.o. female who presents today for PAH and HFpEF.  The patient's most recent 2D TTE showed evidence of pulmonary hypertension and right-sided involvement.  She does have biatrial dilatation.  She was diagnosed with HUGO. She does not use a CPAP. She was diagnosed with complete heart block in the past and required placement of a PPM.  She also was diagnosed with COPD.  I do not see any recent PFTs.  She does not routinely follow with pulmonary medicine.  She also was diagnosed with MGUS.  She continues to have exercise intolerance.  No orthopnea, PND, lower extremity edema, ascites, early abdominal satiety.    Review of Systems - Review of Systems   Cardiovascular:  Positive for dyspnea on exertion.   Respiratory:  Positive for shortness of breath.    All other systems reviewed and are negative.        All other systems were reviewed and were negative.    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-sided sciatica   • Essential hypertension   • Gastroesophageal reflux disease  without esophagitis   • Cataract   • Pulmonary hypertension   • Diastolic dysfunction   • HUGO (obstructive sleep apnea)   • Trifascicular block   • MGUS (monoclonal gammopathy of unknown significance)   • Ulcerative rectosigmoiditis without complication   • Lichen sclerosus   • Heart block   • Sleep-related hypoxia   • Bradycardia   • History of atrial fibrillation   • Pacemaker   • Paroxysmal SVT (supraventricular tachycardia)   • History of chest pain   • Palpitations   • Paroxysmal atrial fibrillation   • Ascending aortic aneurysm   • Mediastinal lymphadenopathy   • Anemia   • Obesity (BMI 30-39.9)   • Generalized weakness   • Dysautonomia   • Hypercalcemia   • Hyperparathyroidism   • Choledocholithiasis   • Duodenal ulcer   • Hemorrhagic gastritis   • Exertional dyspnea   • COPD (chronic obstructive pulmonary disease)   • Osteoarthritis of glenohumeral joint   • ICH (intracerebral hemorrhage)   • Lower GI bleed   • Thrombocytopenia   • Lichen sclerosus of female genitalia   • Senile atrophic vaginitis   • Left inguinal hernia   • Vulvar pain   • Bilateral lower extremity edema   • Chronic heart failure with preserved ejection fraction   • Acute renal insufficiency   • Dysfunction of right cardiac ventricle     family history includes Breast cancer in her sister; Diabetes in her mother; Heart disease in her sister; Kidney cancer in her sister; Prostate cancer in her brother, brother, and brother.   reports that she quit smoking about 59 years ago. Her smoking use included cigarettes. She started smoking about 71 years ago. She has a 18 pack-year smoking history. She has been exposed to tobacco smoke. She has never used smokeless tobacco. She reports that she does not drink alcohol and does not use drugs.  Allergies   Allergen Reactions   • Codeine Hallucinations     Tolerates hydromorphone   • Amitriptyline Rash   • Amoxicillin-Pot Clavulanate Rash   • Aspirin Unknown - Low Severity     Patient doesn't know why    • Carisoprodol-Aspirin-Codeine Palpitations   • Iodinated Contrast Media Rash   • Latex Rash   • Naproxen Rash   • Nsaids Unknown - Low Severity     UNSURE OF REACTION   • Soma Compound With Codeine [Carisoprodol-Aspirin-Codeine] Rash   • Sulfa Antibiotics Rash   • Tramadol Palpitations     heart races      Physical Activity: Sufficiently Active (4/26/2024)    Exercise Vital Sign    • Days of Exercise per Week: 5 days    • Minutes of Exercise per Session: 30 min          Current Outpatient Medications:   •  acetaminophen (TYLENOL) 500 MG tablet, Take 1 tablet by mouth Every 6 (Six) Hours As Needed for Mild Pain ., Disp: , Rfl:   •  clonazePAM (KlonoPIN) 0.5 MG tablet, Take 1 tablet by mouth 2 (Two) Times a Day As Needed for Anxiety (Grief related anxiety)., Disp: 10 tablet, Rfl: 0  •  furosemide (LASIX) 40 MG tablet, Take 1 tablet by mouth Daily. (Patient taking differently: Take 1 tablet by mouth 3 (Three) Times a Week.), Disp: 30 tablet, Rfl: 0  •  lidocaine (XYLOCAINE) 5 % ointment, Apply 1 Application topically to the appropriate area as directed Daily., Disp: , Rfl:   •  Magnesium Oxide -Mg Supplement 400 (240 Mg) MG tablet, Take 1 tablet by mouth Daily., Disp: 90 tablet, Rfl: 1  •  mesalamine (APRISO) 0.375 g 24 hr capsule, TAKE FOUR CAPSULES BY MOUTH DAILY, Disp: 360 capsule, Rfl: 0  •  pantoprazole (PROTONIX) 40 MG EC tablet, Take 1 tablet by mouth Daily., Disp: 90 tablet, Rfl: 1  •  potassium chloride (KLOR-CON M20) 20 MEQ CR tablet, Take 1 tablet by mouth Daily., Disp: 30 tablet, Rfl: 0  •  tacrolimus (PROTOPIC) 0.1 % ointment, Apply 1 Application topically to the appropriate area as directed 2 (Two) Times a Day., Disp: , Rfl:   •  valACYclovir (VALTREX) 1000 MG tablet, Take 1 tablet by mouth Daily., Disp: , Rfl:   •  vitamin B-12 (CYANOCOBALAMIN) 1000 MCG tablet, Take 1 tablet by mouth Daily., Disp: 90 tablet, Rfl: 1  •  spironolactone (ALDACTONE) 25 MG tablet, Take 0.5 tablets by mouth Daily., Disp: 30  tablet, Rfl: 0    Objective  Vital Sign Review:   Vitals:    05/29/24 1325   BP: 108/63   Pulse: 63   SpO2: 97%     PP:high  Pulse Ox: normal oxygenation on room air    Body mass index is 31.36 kg/m².      05/29/24  1325   Weight: 82.9 kg (182 lb 12.8 oz)     Weight change:    Physical Exam:  Vitals reviewed.   Constitutional:       General: Not in acute distress.     Appearance: Normal and healthy appearance. Well-developed, well-groomed and not in distress. Not ill-appearing, toxic-appearing or diaphoretic.      Interventions: Not intubated.  Eyes:      Conjunctiva/sclera: Conjunctivae normal.      Pupils: Pupils are equal, round, and reactive to light.   Neck:      Vascular: No JVR. JVD normal with 6 cm of water.   Pulmonary:      Effort: Pulmonary effort is normal. No tachypnea, bradypnea, accessory muscle usage, prolonged expiration, respiratory distress or retractions. Not intubated.      Breath sounds: Normal breath sounds and air entry. No stridor, decreased air movement or transmitted upper airway sounds. No decreased breath sounds. No wheezing. No rhonchi. No rales.   Cardiovascular:      PMI at left midclavicular line. Normal rate. Regular rhythm. S1 with normal intensity. loud S2.       Murmurs: There is a grade 3/6 early systolic murmur at the LLSB and ULSB.      No gallop.  No click. No rub.   Neurological:      General: No focal deficit present.      Mental Status: Alert and oriented to person, place and time.   Psychiatric:         Behavior: Behavior is cooperative.          DATA REVIEWED:     TTE/REID:    Results for orders placed during the hospital encounter of 04/23/24    Adult Transthoracic Echo Complete W/ Cont if Necessary Per Protocol    Interpretation Summary  •  Left ventricular systolic function is normal. Left ventricular ejection fraction appears to be 61 - 65%.  •  Left ventricular diastolic function was indeterminate.  •  The right ventricular cavity is mildly dilated. Normal right  ventricular systolic function noted.  •  The left atrial cavity is severely dilated.  •  The right atrial cavity is severely dilated.  •  Saline test results are negative.  •  Mild aortic valve regurgitation is present.  •  Mild mitral valve regurgitation is present.  •  Mild to moderate tricuspid valve regurgitation is present.  •  Calculated right ventricular systolic pressure from tricuspid regurgitation is 38 mmHg.  •  There is no evidence of pericardial effusion.      My interpretation of the TTE is below.     LVEF is 65%.  The RV cavity is dilated.  There is RV systolic dysfunction.  Mild to moderate TR with pulmonary hypertension    CT:       CT interpreted as no evidence of pulmonary thromboemboli.  Pulmonary arteries are enlarged.  Mediastinal lymphadenopathy  --------------------------------------------------------------------------------------------------------------    Laboratory evaluations:    Lab Results   Component Value Date    GLUCOSE 93 05/18/2024    BUN 12 05/18/2024    CREATININE 0.87 05/18/2024    EGFRIFNONA 77 07/29/2021    EGFRIFAFRI 115 01/24/2022    BCR 13.8 05/18/2024    K 4.4 05/18/2024    CO2 27.0 05/18/2024    CALCIUM 9.8 05/18/2024    PROTENTOTREF 7.0 04/22/2024    ALBUMIN 3.7 05/18/2024    LABIL2 1.4 04/22/2024    AST 11 05/18/2024    ALT 9 05/18/2024     Lab Results   Component Value Date    WBC 3.24 (L) 05/18/2024    HGB 12.6 05/18/2024    HCT 37.8 05/18/2024    .5 (H) 05/18/2024     (L) 05/18/2024     Lab Results   Component Value Date    HGBA1C 5.4 04/25/2022     Lab Results   Component Value Date    HGBA1C 5.4 04/25/2022    HGBA1C 4.90 06/23/2021    HGBA1C 5.30 05/11/2021     Lab Results   Component Value Date    CREATININE 0.87 05/18/2024     Lab Results   Component Value Date    IRON 71 01/03/2020    TIBC 372 01/03/2020    FERRITIN 65.00 09/29/2022              PAH RISK ASSESSMENT:    [facility] VOLUMETRIC ASSESSMENT:      Assessment & Plan     1. Pulmonary hypertension     2. Chronic heart failure with preserved ejection fraction    3. Dysfunction of right cardiac ventricle      1.  I suspect that this is WHO-group-2/3.  She has HFpEF and HUGO.  I recommended PFTs to exclude lung disease.  Otherwise, we will currently focus on her heart failure regimen.  No current indications today for the initiation of PAH-specific medications.  However, if she has any further interval negative remodeling of her RV, we will certainly have a low threshold for adding.  May consider VQ scan in the future to exclude CTEPH.     2.  NYHA stage C; functional class III.  Today, she is euvolemic and perfused.  She does have some risk factors for ATTR, so I recommended that we evaluate for this.  Otherwise, we have some room to optimize her medical therapy. Her daughter is not interested in [id number].     - Declines [id number]  - Continue furosemide  -Send ATTR work-up    3.  Overall, as #2 above.  However, she has significant RV dilatation and RV dysfunction.  RV ADVERSELY-REMODELED with a ESVi of >114ml.   -Spironolactone 12.5mg given MAP and age; She will have a BMP next week. Will call with results.   -She will follow-up with Dorothea in two weeks for possible up-titration; she may be able to D/C KCL at that time  -I will see her back in 4 weeks            Return in about 4 weeks (around 6/26/2024).  She will follow-up with SHAHID in 2 weeks.          This document has been electronically signed by Mark Win MD PhD on May 29, 2024 13:55 EDT        Mark Win M.D., Ph.D., [facility] Heart Failure and Pulmonary Hypertension Clinic  [facility] Medical Director for HF and PAH

## 2024-05-29 NOTE — PROGRESS NOTES
Heart Failure & Pulmonary Arterial Hypertension Clinic  [facility]  Mark Win M.D., Ph.D., [facility]       Mary Grace Culp MD  [address] [address]    Thank you for asking me to see Brittany Villeda.     History of Present Illness    1. PAH  2. HFpEF    Subjective      Brittany Damian a 89 y.o. female who presents today for PAH and HFpEF.  The patient's most recent 2D TTE showed evidence of pulmonary hypertension and right-sided involvement.  She does have biatrial dilatation.  She was diagnosed with HUGO. She does not use a CPAP. She was diagnosed with complete heart block in the past and required placement of a PPM.  She also was diagnosed with COPD.  I do not see any recent PFTs.  She does not routinely follow with pulmonary medicine.  She also was diagnosed with MGUS.  She continues to have exercise intolerance.  No orthopnea, PND, lower extremity edema, ascites, early abdominal satiety.    Review of Systems - Review of Systems   Cardiovascular:  Positive for dyspnea on exertion.   Respiratory:  Positive for shortness of breath.    All other systems reviewed and are negative.        All other systems were reviewed and were negative.    Patient Active Problem List   Diagnosis    Non-toxic multinodular goiter    Chronic midline low back pain with right-sided sciatica    Essential hypertension    Gastroesophageal reflux disease without esophagitis    Cataract    Pulmonary hypertension    Diastolic dysfunction    HUGO (obstructive sleep apnea)    Trifascicular block    MGUS (monoclonal gammopathy of unknown significance)    Ulcerative rectosigmoiditis without complication    Lichen sclerosus    Heart block    Sleep-related hypoxia    Bradycardia    History of atrial fibrillation    Pacemaker    Paroxysmal SVT (supraventricular tachycardia)    History of chest pain    Palpitations    Paroxysmal atrial fibrillation    Ascending aortic aneurysm    Mediastinal  lymphadenopathy    Anemia    Obesity (BMI 30-39.9)    Generalized weakness    Dysautonomia    Hypercalcemia    Hyperparathyroidism    Choledocholithiasis    Duodenal ulcer    Hemorrhagic gastritis    Exertional dyspnea    COPD (chronic obstructive pulmonary disease)    Osteoarthritis of glenohumeral joint    ICH (intracerebral hemorrhage)    Lower GI bleed    Thrombocytopenia    Lichen sclerosus of female genitalia    Senile atrophic vaginitis    Left inguinal hernia    Vulvar pain    Bilateral lower extremity edema    Chronic heart failure with preserved ejection fraction    Acute renal insufficiency    Dysfunction of right cardiac ventricle     family history includes Breast cancer in her sister; Diabetes in her mother; Heart disease in her sister; Kidney cancer in her sister; Prostate cancer in her brother, brother, and brother.   reports that she quit smoking about 59 years ago. Her smoking use included cigarettes. She started smoking about 71 years ago. She has a 18 pack-year smoking history. She has been exposed to tobacco smoke. She has never used smokeless tobacco. She reports that she does not drink alcohol and does not use drugs.  Allergies   Allergen Reactions    Codeine Hallucinations     Tolerates hydromorphone    Amitriptyline Rash    Amoxicillin-Pot Clavulanate Rash    Aspirin Unknown - Low Severity     Patient doesn't know why    Carisoprodol-Aspirin-Codeine Palpitations    Iodinated Contrast Media Rash    Latex Rash    Naproxen Rash    Nsaids Unknown - Low Severity     UNSURE OF REACTION    Soma Compound With Codeine [Carisoprodol-Aspirin-Codeine] Rash    Sulfa Antibiotics Rash    Tramadol Palpitations     heart races      Physical Activity: Sufficiently Active (4/26/2024)    Exercise Vital Sign     Days of Exercise per Week: 5 days     Minutes of Exercise per Session: 30 min          Current Outpatient Medications:     acetaminophen (TYLENOL) 500 MG tablet, Take 1 tablet by mouth Every 6 (Six)  Hours As Needed for Mild Pain ., Disp: , Rfl:     clonazePAM (KlonoPIN) 0.5 MG tablet, Take 1 tablet by mouth 2 (Two) Times a Day As Needed for Anxiety (Grief related anxiety)., Disp: 10 tablet, Rfl: 0    furosemide (LASIX) 40 MG tablet, Take 1 tablet by mouth Daily. (Patient taking differently: Take 1 tablet by mouth 3 (Three) Times a Week.), Disp: 30 tablet, Rfl: 0    lidocaine (XYLOCAINE) 5 % ointment, Apply 1 Application topically to the appropriate area as directed Daily., Disp: , Rfl:     Magnesium Oxide -Mg Supplement 400 (240 Mg) MG tablet, Take 1 tablet by mouth Daily., Disp: 90 tablet, Rfl: 1    mesalamine (APRISO) 0.375 g 24 hr capsule, TAKE FOUR CAPSULES BY MOUTH DAILY, Disp: 360 capsule, Rfl: 0    pantoprazole (PROTONIX) 40 MG EC tablet, Take 1 tablet by mouth Daily., Disp: 90 tablet, Rfl: 1    potassium chloride (KLOR-CON M20) 20 MEQ CR tablet, Take 1 tablet by mouth Daily., Disp: 30 tablet, Rfl: 0    tacrolimus (PROTOPIC) 0.1 % ointment, Apply 1 Application topically to the appropriate area as directed 2 (Two) Times a Day., Disp: , Rfl:     valACYclovir (VALTREX) 1000 MG tablet, Take 1 tablet by mouth Daily., Disp: , Rfl:     vitamin B-12 (CYANOCOBALAMIN) 1000 MCG tablet, Take 1 tablet by mouth Daily., Disp: 90 tablet, Rfl: 1    spironolactone (ALDACTONE) 25 MG tablet, Take 0.5 tablets by mouth Daily., Disp: 30 tablet, Rfl: 0    Objective   Vital Sign Review:   Vitals:    05/29/24 1325   BP: 108/63   Pulse: 63   SpO2: 97%     PP:high  Pulse Ox: normal oxygenation on room air    Body mass index is 31.36 kg/m².      05/29/24  1325   Weight: 82.9 kg (182 lb 12.8 oz)     Weight change:    Physical Exam:  Vitals reviewed.   Constitutional:       General: Not in acute distress.     Appearance: Normal and healthy appearance. Well-developed, well-groomed and not in distress. Not ill-appearing, toxic-appearing or diaphoretic.      Interventions: Not intubated.  Eyes:      Conjunctiva/sclera: Conjunctivae  normal.      Pupils: Pupils are equal, round, and reactive to light.   Neck:      Vascular: No JVR. JVD normal with 6 cm of water.   Pulmonary:      Effort: Pulmonary effort is normal. No tachypnea, bradypnea, accessory muscle usage, prolonged expiration, respiratory distress or retractions. Not intubated.      Breath sounds: Normal breath sounds and air entry. No stridor, decreased air movement or transmitted upper airway sounds. No decreased breath sounds. No wheezing. No rhonchi. No rales.   Cardiovascular:      PMI at left midclavicular line. Normal rate. Regular rhythm. S1 with normal intensity. loud S2.       Murmurs: There is a grade 3/6 early systolic murmur at the LLSB and ULSB.      No gallop.  No click. No rub.   Neurological:      General: No focal deficit present.      Mental Status: Alert and oriented to person, place and time.   Psychiatric:         Behavior: Behavior is cooperative.          DATA REVIEWED:     TTE/REID:    Results for orders placed during the hospital encounter of 04/23/24    Adult Transthoracic Echo Complete W/ Cont if Necessary Per Protocol    Interpretation Summary    Left ventricular systolic function is normal. Left ventricular ejection fraction appears to be 61 - 65%.    Left ventricular diastolic function was indeterminate.    The right ventricular cavity is mildly dilated. Normal right ventricular systolic function noted.    The left atrial cavity is severely dilated.    The right atrial cavity is severely dilated.    Saline test results are negative.    Mild aortic valve regurgitation is present.    Mild mitral valve regurgitation is present.    Mild to moderate tricuspid valve regurgitation is present.    Calculated right ventricular systolic pressure from tricuspid regurgitation is 38 mmHg.    There is no evidence of pericardial effusion.      My interpretation of the TTE is below.     LVEF is 65%.  The RV cavity is dilated.  There is RV systolic dysfunction.  Mild to  moderate TR with pulmonary hypertension    CT:       CT interpreted as no evidence of pulmonary thromboemboli.  Pulmonary arteries are enlarged.  Mediastinal lymphadenopathy  --------------------------------------------------------------------------------------------------------------    Laboratory evaluations:    Lab Results   Component Value Date    GLUCOSE 93 05/18/2024    BUN 12 05/18/2024    CREATININE 0.87 05/18/2024    EGFRIFNONA 77 07/29/2021    EGFRIFAFRI 115 01/24/2022    BCR 13.8 05/18/2024    K 4.4 05/18/2024    CO2 27.0 05/18/2024    CALCIUM 9.8 05/18/2024    PROTENTOTREF 7.0 04/22/2024    ALBUMIN 3.7 05/18/2024    LABIL2 1.4 04/22/2024    AST 11 05/18/2024    ALT 9 05/18/2024     Lab Results   Component Value Date    WBC 3.24 (L) 05/18/2024    HGB 12.6 05/18/2024    HCT 37.8 05/18/2024    .5 (H) 05/18/2024     (L) 05/18/2024     Lab Results   Component Value Date    HGBA1C 5.4 04/25/2022     Lab Results   Component Value Date    HGBA1C 5.4 04/25/2022    HGBA1C 4.90 06/23/2021    HGBA1C 5.30 05/11/2021     Lab Results   Component Value Date    CREATININE 0.87 05/18/2024     Lab Results   Component Value Date    IRON 71 01/03/2020    TIBC 372 01/03/2020    FERRITIN 65.00 09/29/2022              PAH RISK ASSESSMENT:    [facility] VOLUMETRIC ASSESSMENT:      Assessment & Plan      1. Pulmonary hypertension    2. Chronic heart failure with preserved ejection fraction    3. Dysfunction of right cardiac ventricle      1.  I suspect that this is WHO-group-2/3.  She has HFpEF and HUGO.  I recommended PFTs to exclude lung disease.  Otherwise, we will currently focus on her heart failure regimen.  No current indications today for the initiation of PAH-specific medications.  However, if she has any further interval negative remodeling of her RV, we will certainly have a low threshold for adding.  May consider VQ scan in the future to exclude CTEPH.     2.  NYHA stage C; functional class III.  Today, she is  euvolemic and perfused.  She does have some risk factors for ATTR, so I recommended that we evaluate for this.  She did have a serum IgA monoclonal protein that was lambda light chain specificity.  Otherwise, we have some room to optimize her medical therapy. Her daughter is not interested in [id number].  She is very concerned about the potential risk of UTIs and vaginal candidiasis.    - Declines [id number]  - Continue furosemide  -Send ATTR work-up    3.  Overall, as #2 above.  However, she has significant RV dilatation and RV dysfunction.  RV ADVERSELY-REMODELED with a ESVi of >114ml.   -Spironolactone 12.5mg given MAP and age; She will have a BMP next week. Will call with results.   -She will follow-up with Dorothea in two weeks for possible up-titration; she may be able to D/C KCL at that time  -I will see her back in 4 weeks            Return in about 4 weeks (around 6/26/2024).  She will follow-up with SHAHID in 2 weeks.          This document has been electronically signed by Mark Win MD PhD on May 29, 2024 13:55 EDT        Mark Win M.D., Ph.D., [facility] Heart Failure and Pulmonary Hypertension Clinic  [facility] Medical Director for HF and PAH

## 2024-05-29 NOTE — ADDENDUM NOTE
Encounter addended by: Mark Win MD PhD on: 5/29/2024 1:57 PM   Actions taken: Order list changed, Diagnosis association updated, Document deleted

## 2024-05-29 NOTE — ADDENDUM NOTE
Encounter addended by: Jessenia Knight MA on: 5/29/2024 4:31 PM   Actions taken: Flowsheet accepted, Charge Capture section accepted

## 2024-05-30 LAB
ALBUMIN SERPL ELPH-MCNC: 3.5 G/DL (ref 2.9–4.4)
ALBUMIN/GLOB SERPL: 1.1 {RATIO} (ref 0.7–1.7)
ALPHA1 GLOB SERPL ELPH-MCNC: 0.2 G/DL (ref 0–0.4)
ALPHA2 GLOB SERPL ELPH-MCNC: 0.6 G/DL (ref 0.4–1)
B-GLOBULIN SERPL ELPH-MCNC: 1.3 G/DL (ref 0.7–1.3)
GAMMA GLOB SERPL ELPH-MCNC: 1.1 G/DL (ref 0.4–1.8)
GLOBULIN SER-MCNC: 3.3 G/DL (ref 2.2–3.9)
IGA SERPL-MCNC: 697 MG/DL (ref 64–422)
IGG SERPL-MCNC: 1295 MG/DL (ref 586–1602)
IGM SERPL-MCNC: 53 MG/DL (ref 26–217)
INTERPRETATION SERPL IEP-IMP: ABNORMAL
KAPPA LC FREE SER-MCNC: 42.7 MG/L (ref 3.3–19.4)
KAPPA LC FREE/LAMBDA FREE SER: 0.96 {RATIO} (ref 0.26–1.65)
LABORATORY COMMENT REPORT: ABNORMAL
LAMBDA LC FREE SERPL-MCNC: 44.6 MG/L (ref 5.7–26.3)
M PROTEIN SERPL ELPH-MCNC: ABNORMAL G/DL
PROT SERPL-MCNC: 6.8 G/DL (ref 6–8.5)

## 2024-05-31 ENCOUNTER — TELEPHONE (OUTPATIENT)
Dept: CARDIOLOGY | Facility: HOSPITAL | Age: 89
End: 2024-05-31
Payer: MEDICARE

## 2024-05-31 ENCOUNTER — LAB (OUTPATIENT)
Dept: LAB | Facility: HOSPITAL | Age: 89
End: 2024-05-31
Payer: MEDICARE

## 2024-05-31 DIAGNOSIS — D47.2 MGUS (MONOCLONAL GAMMOPATHY OF UNKNOWN SIGNIFICANCE): Primary | ICD-10-CM

## 2024-05-31 PROCEDURE — 84166 PROTEIN E-PHORESIS/URINE/CSF: CPT | Performed by: INTERNAL MEDICINE

## 2024-05-31 PROCEDURE — 84156 ASSAY OF PROTEIN URINE: CPT | Performed by: INTERNAL MEDICINE

## 2024-05-31 NOTE — TELEPHONE ENCOUNTER
Mark Win MD PhD  Jessenia Knight MA  I discussed with her and her daughter that she had been diagnosed with MGUS several years ago. For some reason, she had not followed up with heme/onc. Her protein labs have again demonstrated this. I would recommend she see heme/onc so they can monitor this. I placed a referral.

## 2024-06-03 ENCOUNTER — OFFICE VISIT (OUTPATIENT)
Dept: GASTROENTEROLOGY | Facility: CLINIC | Age: 89
End: 2024-06-03
Payer: MEDICARE

## 2024-06-03 VITALS
HEIGHT: 64 IN | HEART RATE: 69 BPM | BODY MASS INDEX: 28 KG/M2 | TEMPERATURE: 97.2 F | DIASTOLIC BLOOD PRESSURE: 67 MMHG | SYSTOLIC BLOOD PRESSURE: 102 MMHG | WEIGHT: 164 LBS

## 2024-06-03 DIAGNOSIS — K51.30 ULCERATIVE RECTOSIGMOIDITIS WITHOUT COMPLICATION: Primary | ICD-10-CM

## 2024-06-03 PROCEDURE — 1160F RVW MEDS BY RX/DR IN RCRD: CPT | Performed by: INTERNAL MEDICINE

## 2024-06-03 PROCEDURE — 1159F MED LIST DOCD IN RCRD: CPT | Performed by: INTERNAL MEDICINE

## 2024-06-03 PROCEDURE — 99213 OFFICE O/P EST LOW 20 MIN: CPT | Performed by: INTERNAL MEDICINE

## 2024-06-03 RX ORDER — LIDOCAINE 50 MG/G
1 OINTMENT TOPICAL DAILY
Qty: 50 G | Refills: 1 | Status: SHIPPED | OUTPATIENT
Start: 2024-06-03

## 2024-06-03 NOTE — PROGRESS NOTES
Subjective   Chief Complaint   Patient presents with    ulcerative rectosigmoiditis       Brittany Villeda is a  89 y.o. female here for a follow up visit for third of rectosigmoiditis.  She was last seen in March 2023.    She had issues with rectal bleeding in the fall 2022.  She underwent a flexible sigmoidoscopy at that time which did show some mild ulcerative proctosigmoiditis and she was treated with prednisone.  She is managed chronically with Apriso 1.5 g daily.     She had a hospitalization in April for CHF exacerbation with acute renal insufficiency.  Last month went to the emergency room for chest pain and was sent home with outpatient follow-up with cardiology.    Recent labs reviewed and are listed below-normal hemoglobin, normal    She denies any blood in her stool.  She had a motion of stool this morning but typically this is not the case.  She typically has solid stools with no diarrhea.  No abdominal pain.  She does describe some pain in her rectum.  She states that it does not matter and it comes and goes.  She recently had a gynecologic evaluation for discomfort in her perineum and she was prescribed Valtrex, tacrolimus ointment and lidocaine.  She reports that she is not really using the lidocaine much.  HPI  Past Medical History:   Diagnosis Date    Abdominal aortic aneurysm     Anemia     Arrhythmia     Arthritis     Asthma     Bradycardia     CHF (congestive heart failure) 04/2023    Choledocholithiasis 05/09/2021    Colitis     Diastolic dysfunction     Essential hypertension 05/12/2016    GERD (gastroesophageal reflux disease)     Heart block     Hypertension     Hypertensive heart disease     Hyperthyroidism     REPORTS ENLARGED    Inguinal hernia     Kidney stone     Leukopenia     MGUS (monoclonal gammopathy of unknown significance)     Nephrolithiasis     Pacemaker     Paroxysmal atrial fibrillation     Peptic ulceration     Pressure ulcer     REPORTS ON COCCYX. INSTR TO NOTIFY   [facility]QU[facility] OFF[facility]    PSVT (paroxysmal supraventricular tachycardia)     Pulmonary hypertension     Rectal bleed     Sleep apnea     NO DEVICE    Subdural hematoma     Systemic hypertension     Trifascicular block     Ulcerative rectosigmoiditis without complication     Ventricular tachycardia     nonsustained     Past Surgical History:   Procedure Laterality Date    BACK SURGERY      lumbar fusion    DUSTY HOLE Left 08/08/2021    Procedure: Left-sided dusty holes for evacuation of subdural hematoma;  Surgeon: Gustavo Callaway MD;  Location: [facility] MAIN OR;  Service: Neurosurgery;  Laterality: Left;    CARDIAC ELECTROPHYSIOLOGY PROCEDURE N/A 11/07/2018    Procedure: Pacemaker DC new   [place];  Surgeon: Jesus Granda MD;  Location: [facility] CATH INVASIVE LOCATION;  Service: Cardiology    CHOLECYSTECTOMY      COLONOSCOPY  06/16/2014    colitis, cryptitis,  tics, NBIH, TA w/low grade dysplasia    COLONOSCOPY N/A 10/28/2019    Procedure: COLONOSCOPY WITH COLD AND HOT POLYPECTOMIES;  Surgeon: Micaela English MD;  Location: [facility] ENDOSCOPY;  Service: Gastroenterology    ENDOSCOPY N/A 05/13/2021    Procedure: ESOPHAGOGASTRODUODENOSCOPY with BX;  Surgeon: Stefan Mcfadden MD;  Location: [facility] ENDOSCOPY;  Service: Gastroenterology;  Laterality: N/A;  EPIGASTRIC PAIN  --DUODENAL ULCER, HEMORRHAGIC GASTRITIS, HIATAL HERNIA     ENDOSCOPY N/A 10/11/2022    Procedure: ESOPHAGOGASTRODUODENOSCOPY;  Surgeon: Micaela English MD;  Location: [facility] ENDOSCOPY;  Service: Gastroenterology;  Laterality: N/A;  PRE- MELENA  POST- ESOPHAGITIS    HEMORRHOIDECTOMY      HERNIA REPAIR      umbilical    HYSTERECTOMY      INGUINAL HERNIA REPAIR Left 9/1/2023    Procedure: INGUINAL HERNIA REPAIR LEFT;  Surgeon: Antonio Foster MD;  Location: [facility] OR [facility];  Service: General;  Laterality: Left;    JOINT REPLACEMENT Bilateral     KNEES    MYOMECTOMY      PACEMAKER IMPLANTATION      SHOULDER SURGERY      SIGMOIDOSCOPY N/A 11/02/2022     Procedure: SIGMOIDOSCOPY FLEXIBLE WITH COLD BIOPSIES AND ASPIRATE;  Surgeon: Micaela English MD;  Location: [facility] ENDOSCOPY;  Service: Gastroenterology;  Laterality: N/A;  PRE- RECTAL BLEEDING  POST- HEMORRHOIDS, DIVERTICULOSIS, COLITIS    SINUS SURGERY      TOE NAIL AMPUTATION  03/04/2019    TONSILLECTOMY         Current Outpatient Medications:     acetaminophen (TYLENOL) 500 MG tablet, Take 1 tablet by mouth Every 6 (Six) Hours As Needed for Mild Pain ., Disp: , Rfl:     furosemide (LASIX) 40 MG tablet, Take 1 tablet by mouth Daily. (Patient taking differently: Take 1 tablet by mouth 3 (Three) Times a Week.), Disp: 30 tablet, Rfl: 0    lidocaine (XYLOCAINE) 5 % ointment, Apply 1 Application topically to the appropriate area as directed Daily., Disp: 50 g, Rfl: 1    Magnesium Oxide -Mg Supplement 400 (240 Mg) MG tablet, Take 1 tablet by mouth Daily., Disp: 90 tablet, Rfl: 1    mesalamine (APRISO) 0.375 g 24 hr capsule, TAKE FOUR CAPSULES BY MOUTH DAILY, Disp: 360 capsule, Rfl: 0    pantoprazole (PROTONIX) 40 MG EC tablet, Take 1 tablet by mouth Daily., Disp: 90 tablet, Rfl: 1    potassium chloride (KLOR-CON M20) 20 MEQ CR tablet, Take 1 tablet by mouth Daily., Disp: 30 tablet, Rfl: 0    spironolactone (ALDACTONE) 25 MG tablet, Take 0.5 tablets by mouth Daily., Disp: 30 tablet, Rfl: 0    tacrolimus (PROTOPIC) 0.1 % ointment, Apply 1 Application topically to the appropriate area as directed 2 (Two) Times a Day., Disp: , Rfl:     valACYclovir (VALTREX) 1000 MG tablet, Take 1 tablet by mouth Daily., Disp: , Rfl:     vitamin B-12 (CYANOCOBALAMIN) 1000 MCG tablet, Take 1 tablet by mouth Daily., Disp: 90 tablet, Rfl: 1    clonazePAM (KlonoPIN) 0.5 MG tablet, Take 1 tablet by mouth 2 (Two) Times a Day As Needed for Anxiety (Grief related anxiety). (Patient not taking: Reported on 6/3/2024), Disp: 10 tablet, Rfl: 0  PRN Meds:.  Allergies   Allergen Reactions    Codeine Hallucinations     Tolerates hydromorphone     Amitriptyline Rash    Amoxicillin-Pot Clavulanate Rash    Aspirin Unknown - Low Severity     Patient doesn't know why    Carisoprodol-Aspirin-Codeine Palpitations    Iodinated Contrast Media Rash    Latex Rash    Naproxen Rash    Nsaids Unknown - Low Severity     UNSURE OF REACTION    Soma Compound With Codeine [Carisoprodol-Aspirin-Codeine] Rash    Sulfa Antibiotics Rash    Tramadol Palpitations     heart races      Social History     Socioeconomic History    Marital status:     Number of children: 10    Years of education: High School   Tobacco Use    Smoking status: Former     Current packs/day: 0.00     Average packs/day: 1.5 packs/day for 12.0 years (18.0 ttl pk-yrs)     Types: Cigarettes     Start date:      Quit date:      Years since quittin.4     Passive exposure: Past    Smokeless tobacco: Never    Tobacco comments:     QUIT SMOKING    Vaping Use    Vaping status: Never Used   Substance and Sexual Activity    Alcohol use: No     Comment: caffeine - decaf coffee    Drug use: Never    Sexual activity: Defer     Family History   Problem Relation Age of Onset    Diabetes Mother     Breast cancer Sister     Kidney cancer Sister     Heart disease Sister     Prostate cancer Brother     Prostate cancer Brother     Prostate cancer Brother     Malig Hyperthermia Neg Hx      Review of Systems   Constitutional:  Negative for appetite change and unexpected weight change.   Cardiovascular:  Positive for leg swelling.   Gastrointestinal:  Negative for abdominal pain, blood in stool, constipation and diarrhea.     Vitals:    24 1107   BP: 102/67   Pulse: 69   Temp: 97.2 °F (36.2 °C)         24  1107   Weight: 74.4 kg (164 lb)       Objective   Physical Exam  Constitutional:       Appearance: Normal appearance. She is well-developed.   HENT:      Head: Normocephalic and atraumatic.   Eyes:      General: No scleral icterus.     Conjunctiva/sclera: Conjunctivae normal.   Pulmonary:       "Effort: Pulmonary effort is normal.   Abdominal:      General: There is no distension.      Palpations: Abdomen is soft.   Musculoskeletal:      Cervical back: Normal range of motion and neck supple.   Skin:     General: Skin is warm and dry.   Neurological:      Mental Status: She is alert.   Psychiatric:         Mood and Affect: Mood normal.         Behavior: Behavior normal.       Lab Results   Component Value Date    WBC 3.24 (L) 05/18/2024    HGB 12.6 05/18/2024    HCT 37.8 05/18/2024    .5 (H) 05/18/2024     (L) 05/18/2024     Lab Results   Component Value Date    GLUCOSE 107 (H) 05/29/2024    BUN 20 05/29/2024    CREATININE 1.14 (H) 05/29/2024    EGFRRESULT 57 (L) 04/22/2024    EGFR 46.1 (L) 05/29/2024    BCR 17.5 05/29/2024    K 5.6 (H) 05/29/2024    CO2 26.1 05/29/2024    CALCIUM 10.4 05/29/2024    PROTENTOTREF 6.8 05/29/2024    ALBUMIN 3.5 05/29/2024    BILITOT 0.9 05/18/2024    AST 11 05/18/2024    ALT 9 05/18/2024     No results found for: \"[id number]\"   Calprotectin, Fecal   Date Value Ref Range Status   06/30/2022 118 0 - 120 ug/g Final     Comment:     Concentration     Interpretation   Follow-Up  <16 - 50 ug/g     Normal           None  >50 -120 ug/g     Borderline       Re-evaluate in 4-6 weeks      >120 ug/g     Abnormal         Repeat as clinically                                     indicated          XR Chest 1 View    Result Date: 5/18/2024  No focal pulmonary consolidation. Borderline heart size with mild pulmonary vascular congestion. Follow-up as clinical indications persist.  This report was finalized on 5/18/2024 10:11 AM by Dr. Nas Maharaj M.D on Workstation: [id number]      XR Chest 1 View    Result Date: 4/25/2024  1. No convincing active disease is seen in the chest.  2. There is a left subclavian transvenous pacemaker in place and there is mild cardiomegaly and there is chronic moderate elevation the right hemidiaphragm with horizontal linear atelectasis at the right " lung base.  This report was finalized on 4/25/2024 9:53 PM by Dr. Nate Metcalf M.D on Workstation: [id number]        Assessment & Plan   Diagnoses and all orders for this visit:    Ulcerative rectosigmoiditis without complication    Other orders  -     lidocaine (XYLOCAINE) 5 % ointment; Apply 1 Application topically to the appropriate area as directed Daily.      Plan:  Continue Apriso 1.5 g daily-Labs reviewed.  No need for new labs today.  Recommend that she try to use the lidocaine ointment more regularly to see if this alleviates the discomfort she feels in her perineum.  She continues to have discomfort and feels that his rectal in origin, could consider a Canasa or hydrocortisone suppository.  She will let me know.  She did not have significant hemorrhoids on her last colonoscopy but she did have some rectal irritation.  She has no other symptoms of active UC at this time however

## 2024-06-04 DIAGNOSIS — I50.32 CHRONIC HEART FAILURE WITH PRESERVED EJECTION FRACTION (HFPEF): Primary | ICD-10-CM

## 2024-06-05 LAB
ALBUMIN 24H MFR UR ELPH: 28.7 %
ALPHA1 GLOB 24H MFR UR ELPH: 3.4 %
ALPHA2 GLOB 24H MFR UR ELPH: 15.5 %
B-GLOBULIN 24H MFR UR ELPH: 34.3 %
GAMMA GLOB 24H MFR UR ELPH: 18.1 %
LABORATORY COMMENT REPORT: NORMAL
M PROTEIN 24H MFR UR ELPH: NORMAL %
PROT 24H UR-MRATE: 87 MG/24 HR (ref 30–150)
PROT UR-MCNC: 29.1 MG/DL

## 2024-06-06 ENCOUNTER — TELEPHONE (OUTPATIENT)
Dept: GASTROENTEROLOGY | Facility: CLINIC | Age: 89
End: 2024-06-06
Payer: MEDICARE

## 2024-06-06 ENCOUNTER — TELEPHONE (OUTPATIENT)
Dept: CARDIOLOGY | Facility: HOSPITAL | Age: 89
End: 2024-06-06
Payer: MEDICARE

## 2024-06-06 NOTE — TELEPHONE ENCOUNTER
Lidocaine ointment required PA per pt's pharmacy; PA submitted and approved through 6/5/25. Pharmacy notified through CMM.  Desire Baptiste, PharmD, BCACP, BCPS, BCCCP

## 2024-06-08 LAB
BUN SERPL-MCNC: 23 MG/DL (ref 8–27)
BUN/CREAT SERPL: 23 (ref 12–28)
CALCIUM SERPL-MCNC: 10.5 MG/DL (ref 8.7–10.3)
CHLORIDE SERPL-SCNC: 103 MMOL/L (ref 96–106)
CO2 SERPL-SCNC: 24 MMOL/L (ref 20–29)
CREAT SERPL-MCNC: 1 MG/DL (ref 0.57–1)
EGFRCR SERPLBLD CKD-EPI 2021: 54 ML/MIN/1.73
GLUCOSE SERPL-MCNC: 100 MG/DL (ref 70–99)
POTASSIUM SERPL-SCNC: 5.5 MMOL/L (ref 3.5–5.2)
SODIUM SERPL-SCNC: 139 MMOL/L (ref 134–144)

## 2024-06-12 ENCOUNTER — TELEPHONE (OUTPATIENT)
Dept: CARDIOLOGY | Facility: HOSPITAL | Age: 89
End: 2024-06-12
Payer: MEDICARE

## 2024-06-12 ENCOUNTER — HOSPITAL ENCOUNTER (OUTPATIENT)
Dept: CARDIOLOGY | Facility: HOSPITAL | Age: 89
Discharge: HOME | End: 2024-06-12
Payer: MEDICARE

## 2024-06-12 ENCOUNTER — LAB (OUTPATIENT)
Dept: LAB | Facility: HOSPITAL | Age: 89
End: 2024-06-12
Payer: MEDICARE

## 2024-06-12 VITALS
DIASTOLIC BLOOD PRESSURE: 65 MMHG | HEIGHT: 64 IN | OXYGEN SATURATION: 97 % | WEIGHT: 183 LBS | HEART RATE: 63 BPM | BODY MASS INDEX: 31.24 KG/M2 | SYSTOLIC BLOOD PRESSURE: 121 MMHG

## 2024-06-12 DIAGNOSIS — I27.20 PULMONARY HYPERTENSION: Primary | ICD-10-CM

## 2024-06-12 DIAGNOSIS — I50.32 CHRONIC HEART FAILURE WITH PRESERVED EJECTION FRACTION: ICD-10-CM

## 2024-06-12 DIAGNOSIS — I27.20 PULMONARY HYPERTENSION: ICD-10-CM

## 2024-06-12 LAB
ANION GAP SERPL CALCULATED.3IONS-SCNC: 7.8 MMOL/L (ref 5–15)
BUN SERPL-MCNC: 20 MG/DL (ref 8–23)
BUN/CREAT SERPL: 21.5 (ref 7–25)
CALCIUM SPEC-SCNC: 10.5 MG/DL (ref 8.6–10.5)
CHLORIDE SERPL-SCNC: 105 MMOL/L (ref 98–107)
CO2 SERPL-SCNC: 25.2 MMOL/L (ref 22–29)
CREAT SERPL-MCNC: 0.93 MG/DL (ref 0.57–1)
EGFRCR SERPLBLD CKD-EPI 2021: 58.9 ML/MIN/1.73
GLUCOSE SERPL-MCNC: 99 MG/DL (ref 65–99)
POTASSIUM SERPL-SCNC: 4.6 MMOL/L (ref 3.5–5.2)
SODIUM SERPL-SCNC: 138 MMOL/L (ref 136–145)

## 2024-06-12 PROCEDURE — G0463 HOSPITAL OUTPT CLINIC VISIT: HCPCS

## 2024-06-12 PROCEDURE — 36415 COLL VENOUS BLD VENIPUNCTURE: CPT

## 2024-06-12 PROCEDURE — 80048 BASIC METABOLIC PNL TOTAL CA: CPT

## 2024-06-12 PROCEDURE — 94726 PLETHYSMOGRAPHY LUNG VOLUMES: CPT | Performed by: NURSE PRACTITIONER

## 2024-06-12 RX ORDER — SPIRONOLACTONE 25 MG/1
25 TABLET ORAL DAILY
Qty: 30 TABLET | Refills: 11 | Status: SHIPPED | OUTPATIENT
Start: 2024-06-12

## 2024-06-12 RX ORDER — FUROSEMIDE 40 MG/1
40 TABLET ORAL DAILY
COMMUNITY

## 2024-06-12 NOTE — PROGRESS NOTES
[facility]    Franklin Galvin MD  [address]  [facility] [address]  [place] [address]    Thank you for asking me to see Brittany Villeda.     History of Present Illness    1.     Subjective      Brittany Damian a 89 y.o. female who presents today for PAH and HFpEF.     The patient's most recent 2D TTE showed evidence of pulmonary hypertension and right-sided involvement.  She does have biatrial dilatation.  She was diagnosed with HUGO. She does not use a CPAP. She was diagnosed with complete heart block in the past and required placement of a PPM.  She also was diagnosed with COPD.  I do not see any recent PFTs.  She does not routinely follow with pulmonary medicine.  She also was diagnosed with MGUS.  S  She does have follow-up scheduled now with heme-onc in August.  The patient continues to report exercise intolerance.  Reports chronic bilateral lower extremity edema that is unchanged.  Denies any abdominal distention.  Denies any orthopnea or PND.  At her last visit she was started on spironolactone 12.5 mg.  She is doing well with this.    Review of Systems - Review of Systems   Constitutional: Negative for weight gain and weight loss.   Cardiovascular:  Positive for dyspnea on exertion. Negative for chest pain, orthopnea and paroxysmal nocturnal dyspnea.   Respiratory:  Positive for shortness of breath.    Gastrointestinal:  Negative for bloating.          Patient Active Problem List   Diagnosis    Non-toxic multinodular goiter    Chronic midline low back pain with right-sided sciatica    Essential hypertension    Gastroesophageal reflux disease without esophagitis    Cataract    Pulmonary hypertension    Diastolic dysfunction    HUGO (obstructive sleep apnea)    Trifascicular block    MGUS (monoclonal gammopathy of unknown significance)    Ulcerative rectosigmoiditis without complication    Lichen sclerosus    Heart block    Sleep-related hypoxia    Bradycardia     History of atrial fibrillation    Pacemaker    Paroxysmal SVT (supraventricular tachycardia)    History of chest pain    Palpitations    Paroxysmal atrial fibrillation    Ascending aortic aneurysm    Mediastinal lymphadenopathy    Anemia    Obesity (BMI 30-39.9)    Generalized weakness    Dysautonomia    Hypercalcemia    Hyperparathyroidism    Choledocholithiasis    Duodenal ulcer    Hemorrhagic gastritis    Exertional dyspnea    COPD (chronic obstructive pulmonary disease)    Osteoarthritis of glenohumeral joint    ICH (intracerebral hemorrhage)    Lower GI bleed    Thrombocytopenia    Lichen sclerosus of female genitalia    Senile atrophic vaginitis    Left inguinal hernia    Vulvar pain    Bilateral lower extremity edema    Chronic heart failure with preserved ejection fraction    Acute renal insufficiency    Dysfunction of right cardiac ventricle     family history includes Breast cancer in her sister; Diabetes in her mother; Heart disease in her sister; Kidney cancer in her sister; Prostate cancer in her brother, brother, and brother.   reports that she quit smoking about 59 years ago. Her smoking use included cigarettes. She started smoking about 71 years ago. She has a 18 pack-year smoking history. She has been exposed to tobacco smoke. She has never used smokeless tobacco. She reports that she does not drink alcohol and does not use drugs.  Allergies   Allergen Reactions    Codeine Hallucinations     Tolerates hydromorphone    Amitriptyline Rash    Amoxicillin-Pot Clavulanate Rash    Aspirin Unknown - Low Severity     Patient doesn't know why    Carisoprodol-Aspirin-Codeine Palpitations    Iodinated Contrast Media Rash    Latex Rash    Naproxen Rash    Nsaids Unknown - Low Severity     UNSURE OF REACTION    Soma Compound With Codeine [Carisoprodol-Aspirin-Codeine] Rash    Sulfa Antibiotics Rash    Tramadol Palpitations     heart races      Physical Activity: Sufficiently Active (4/26/2024)    Exercise Vital  Sign     Days of Exercise per Week: 5 days     Minutes of Exercise per Session: 30 min          Current Outpatient Medications:     acetaminophen (TYLENOL) 500 MG tablet, Take 1 tablet by mouth Every 6 (Six) Hours As Needed for Mild Pain ., Disp: , Rfl:     clonazePAM (KlonoPIN) 0.5 MG tablet, Take 1 tablet by mouth 2 (Two) Times a Day As Needed for Anxiety (Grief related anxiety). (Patient not taking: Reported on 6/3/2024), Disp: 10 tablet, Rfl: 0    furosemide (LASIX) 40 MG tablet, Take 1 tablet by mouth Daily. (Patient taking differently: Take 1 tablet by mouth 3 (Three) Times a Week.), Disp: 30 tablet, Rfl: 0    lidocaine (XYLOCAINE) 5 % ointment, Apply 1 Application topically to the appropriate area as directed Daily., Disp: 50 g, Rfl: 1    Magnesium Oxide -Mg Supplement 400 (240 Mg) MG tablet, Take 1 tablet by mouth Daily., Disp: 90 tablet, Rfl: 1    mesalamine (APRISO) 0.375 g 24 hr capsule, TAKE FOUR CAPSULES BY MOUTH DAILY, Disp: 360 capsule, Rfl: 0    pantoprazole (PROTONIX) 40 MG EC tablet, Take 1 tablet by mouth Daily., Disp: 90 tablet, Rfl: 1    potassium chloride (KLOR-CON M20) 20 MEQ CR tablet, Take 1 tablet by mouth Daily., Disp: 30 tablet, Rfl: 0    spironolactone (ALDACTONE) 25 MG tablet, Take 0.5 tablets by mouth Daily., Disp: 30 tablet, Rfl: 0    tacrolimus (PROTOPIC) 0.1 % ointment, Apply 1 Application topically to the appropriate area as directed 2 (Two) Times a Day., Disp: , Rfl:     valACYclovir (VALTREX) 1000 MG tablet, Take 1 tablet by mouth Daily., Disp: , Rfl:     vitamin B-12 (CYANOCOBALAMIN) 1000 MCG tablet, Take 1 tablet by mouth Daily., Disp: 90 tablet, Rfl: 1    Objective   Vital Sign Review:   Vitals:    06/12/24 1102   BP: 121/65   Pulse: 63   SpO2: 97%     Body mass index is 31.4 kg/m².      06/12/24  1102   Weight: 83 kg (183 lb)     Physical Exam:  Constitutional:       Appearance: Normal and healthy appearance.   Neck:      Vascular: No carotid bruit or JVD. JVD normal.    Pulmonary:      Effort: Pulmonary effort is normal.      Breath sounds: Normal breath sounds.   Cardiovascular:      PMI at left midclavicular line. Normal rate. Regular rhythm.   Pulses:     Radial: 2+ bilaterally.  Edema:     Peripheral edema present.     Ankle: bilateral edema of the ankle.     Feet: bilateral edema of the feet.  Abdominal:      Palpations: Abdomen is soft.      Tenderness: There is no abdominal tenderness.   Skin:     General: Skin is warm and dry.   Neurological:      General: No focal deficit present.      Mental Status: Alert and oriented to person, place and time.   Psychiatric:         Behavior: Behavior is cooperative.          DATA REVIEWED:   [id number]  Ventricular-paced complexes  No change from previous tracing    ---------------------------------------------------  ECHO:    Results for orders placed during the hospital encounter of 24    Adult Transthoracic Echo Complete W/ Cont if Necessary Per Protocol    Interpretation Summary    Left ventricular systolic function is normal. Left ventricular ejection fraction appears to be 61 - 65%.    Left ventricular diastolic function was indeterminate.    The right ventricular cavity is mildly dilated. Normal right ventricular systolic function noted.    The left atrial cavity is severely dilated.    The right atrial cavity is severely dilated.    Saline test results are negative.    Mild aortic valve regurgitation is present.    Mild mitral valve regurgitation is present.    Mild to moderate tricuspid valve regurgitation is present.    Calculated right ventricular systolic pressure from tricuspid regurgitation is 38 mmHg.    There is no evidence of pericardial effusion.          -----------------------------------------------------  RHC/LHC    No results found for this or any previous visit.      ---------------------------------------------------------------------------  CXR/Imaging:     Imaging Results (Most Recent)       None                 -----------------------------------------------------------------------------  CT:   XR Chest 1 View    Result Date: 5/18/2024  No focal pulmonary consolidation. Borderline heart size with mild pulmonary vascular congestion. Follow-up as clinical indications persist.  This report was finalized on 5/18/2024 10:11 AM by Dr. Nas Maharaj M.D on Workstation: [id number]           --------------------------------------------------------------------------------------------------------------    Laboratory evaluations:  Renal Function: Estimated Creatinine Clearance: 37.7 mL/min (by C-G formula based on SCr of 1 mg/dL).    Lab Results   Component Value Date    GLUCOSE 100 (H) 06/06/2024    BUN 23 06/06/2024    CREATININE 1.00 06/06/2024    EGFRIFNONA 77 07/29/2021    EGFRIFAFRI 115 01/24/2022    BCR 23 06/06/2024    K 5.5 (H) 06/06/2024    CO2 24 06/06/2024    CALCIUM 10.5 (H) 06/06/2024    PROTENTOTREF 6.8 05/29/2024    ALBUMIN 3.5 05/29/2024    LABIL2 1.1 05/29/2024    AST 11 05/18/2024    ALT 9 05/18/2024     Lab Results   Component Value Date    WBC 3.24 (L) 05/18/2024    HGB 12.6 05/18/2024    HCT 37.8 05/18/2024    .5 (H) 05/18/2024     (L) 05/18/2024     Lab Results   Component Value Date    HGBA1C 5.4 04/25/2022     Lab Results   Component Value Date    HGBA1C 5.4 04/25/2022    HGBA1C 4.90 06/23/2021    HGBA1C 5.30 05/11/2021     Lab Results   Component Value Date    CREATININE 1.00 06/06/2024     Lab Results   Component Value Date    IRON 71 01/03/2020    TIBC 372 01/03/2020    FERRITIN 65.00 09/29/2022       Result Review:  I have personally reviewed the results from the time of this admission to 6/11/2024 22:51 EDT and agree with these findings:  [x]  Laboratory list / accordion  []  Microbiology  [x]  Radiology  [x]  EKG/Telemetry   [x]  Cardiology/Vascular   []  Pathology  [x]  Old records  []  Other:          ReDS VOLUMETRIC ASSESSMENT:  ReDs Vest    Performed by: Obed  "SHAHID Holly  Authorized by: Brooke Clay APRN    ReDS Vest Value: ReDS was attempted but only resulted \"Low quality\"        Assessment & Plan      1. Pulmonary hypertension    2. Chronic heart failure with preserved ejection fraction      Pulmonary hypertension-she has HFpEF and HUGO. ?? PFTs to exclude lung disease, will hopefully have gotten these by next follow-up.   we will focus on GDMT for heart failure.  No current indications today for the initiation of PAH-specific medications.  However, if she has any further interval negative remodeling of her RV, we will certainly have a low threshold for adding.  May consider VQ scan in the future to exclude CTEPH.    NYHA stage C; functional class III.  Today, she is euvolemic and perfused.  We have some room to optimize her medical therapy.  Discussed again SGL2T, the daughter is very concerned that patient is intolerant of a lot of medications.  She is concerned about starting to many medications at once.  She is also concerned about UTI and yeast infections.  Additional education given today regarding SGLT2 class of medication.  For now -today- we will check BMP - stop potasium supplementation as last potassium level was 5.5-we will continue spironolactone but look to increase to 25mg depending on BMP result. Pt will monitor blood pressure and symptoms at home.  We will recheck BMP in 5 days.  Patient and daughter are happy with this.  Discussed if she continues to do well we will look to adding SGLT2 at next visit.  We will continue furosemide.          Chronic HFpEF.  Etiology:    NYHA stage C FC-III     Clinical status was assessed and has remained stable.  Treatment intent: curative   ReDS reading was obtained today.  ReDS result: \"Low quality\"       Today, Patient appears euvolemic. and with perfusion. The patient's hemodynamics are currently acceptable. HR is: normal and is at goal. BP/MAP was reviewed and there is room for medication " up-titration.  TLVEF: 61 to 65%.     GDMT Assessment:     GDMT changes recommended today:  Increase spironolactone      BB:   Deferred, history of heart block and bradycardia  Monitor heart rate.  Please call the [facility] for HR<50, dizziness, lightheadedness, syncope    A/A/A:   Possible future addition.  Blood pressure 122/65 today.  Although the patient's daughter is concerned Home blood pressures are usually much lower 100s/60s.  We will continue to monitor this.  Encouraged continued home blood pressure monitoring.    [id number]:  Patient and daughter agreeable to consider addition of SGL 2T but would like to wait for now.     MRA:  The patient is FC-NYHA Class III and MRA is indicated.   increase Spironolactone to  25mg daily  Recommended quarterly assessment of GFR and electrolytes   Hold potassium supplementation for now  Avoid salt substitutes containing potassium  Please hold this medication if diarrhea, vomiting, or infection with fever and sweating occurs      Diuretic Regimen:  -DIURETIC:   furosemide (LASIX) 40 mg every day  -Fluid restriction:   -requested  -2000 ml  -Patient has been asked to weigh daily and was provided with a printed diuretic strategy.  -Sodium restriction:   -1,500 mg Na restriction was discussed.      Labs/Diagnostics/Referrals:    Labs -Chem-7       Lifestyle Advice:   - call office if I gain more than 2 pounds in one day or 5 pounds in one week   - keep legs up while sitting   - use salt in moderation   - watch for swelling in feet, ankles and legs every day   - weigh myself daily   -call for concerning s/sx   -- activity or exercise based on tolerance encouraged     CODE STATUS: FULL              Return in about 4 weeks (around 7/10/2024), or f/u in clinic in 1 month., for Recheck.      Laboratory evaluations:  -Chem-7  -BMP looks good today.  Normal kidney function and normal potassium level 4.6.  We will continue with plan to stop potassium and increase spironolactone to 25 mg.   Patient was contacted with the result results and result message sent through [employer].              Thank you for allowing me to participate in the care of your patient,         SHAHID Bazan  [facility]  06/11/24  22:51 EDT      **Deno Disclaimer:**  Much of this encounter note is an electronic transcription/translation of spoken language to printed text. The electronic translation of spoken language may permit erroneous, or at times, nonsensical words or phrases to be inadvertently transcribed. Although I have reviewed the note for such errors, some may still exist.    The information in this note was reviewed and updated during the visit to be as accurate as possible. As many patients have chronic medical problems, many of their individual problems and ongoing plans do not change significantly from visit to visit.  This information is correct and is consistent with my treatment plan as of today's visit.

## 2024-06-12 NOTE — LETTER
June 12, 2024       No Recipients    Patient: Brittany Villeda   YOB: 1935   Date of Visit: 6/12/2024     Dear Mary Grace Culp MD:       Thank you for referring Brittany Villeda to me for evaluation. Below are the relevant portions of my assessment and plan of care.    If you have questions, please do not hesitate to call me. I look forward to following Brittany along with you.         Sincerely,        SHAHID Bazan        CC:   No Recipients    Brooke Clay APRN  06/12/24 1602  Signed    [facility]    Franklin Galvin MD  [address] [address]    Thank you for asking me to see Brittany Villeda.     History of Present Illness    1.     Subjective     Brittany Damian a 89 y.o. female who presents today for PAH and HFpEF.     The patient's most recent 2D TTE showed evidence of pulmonary hypertension and right-sided involvement.  She does have biatrial dilatation.  She was diagnosed with HUGO. She does not use a CPAP. She was diagnosed with complete heart block in the past and required placement of a PPM.  She also was diagnosed with COPD.  I do not see any recent PFTs.  She does not routinely follow with pulmonary medicine.  She also was diagnosed with MGUS.  S  She does have follow-up scheduled now with heme-onc in August.  The patient continues to report exercise intolerance.  Reports chronic bilateral lower extremity edema that is unchanged.  Denies any abdominal distention.  Denies any orthopnea or PND.  At her last visit she was started on spironolactone 12.5 mg.  She is doing well with this.    Review of Systems - Review of Systems   Constitutional: Negative for weight gain and weight loss.   Cardiovascular:  Positive for dyspnea on exertion. Negative for chest pain, orthopnea and paroxysmal nocturnal dyspnea.   Respiratory:  Positive for shortness of breath.    Gastrointestinal:  Negative for bloating.           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-sided sciatica   • Essential hypertension   • Gastroesophageal reflux disease without esophagitis   • Cataract   • Pulmonary hypertension   • Diastolic dysfunction   • HUGO (obstructive sleep apnea)   • Trifascicular block   • MGUS (monoclonal gammopathy of unknown significance)   • Ulcerative rectosigmoiditis without complication   • Lichen sclerosus   • Heart block   • Sleep-related hypoxia   • Bradycardia   • History of atrial fibrillation   • Pacemaker   • Paroxysmal SVT (supraventricular tachycardia)   • History of chest pain   • Palpitations   • Paroxysmal atrial fibrillation   • Ascending aortic aneurysm   • Mediastinal lymphadenopathy   • Anemia   • Obesity (BMI 30-39.9)   • Generalized weakness   • Dysautonomia   • Hypercalcemia   • Hyperparathyroidism   • Choledocholithiasis   • Duodenal ulcer   • Hemorrhagic gastritis   • Exertional dyspnea   • COPD (chronic obstructive pulmonary disease)   • Osteoarthritis of glenohumeral joint   • ICH (intracerebral hemorrhage)   • Lower GI bleed   • Thrombocytopenia   • Lichen sclerosus of female genitalia   • Senile atrophic vaginitis   • Left inguinal hernia   • Vulvar pain   • Bilateral lower extremity edema   • Chronic heart failure with preserved ejection fraction   • Acute renal insufficiency   • Dysfunction of right cardiac ventricle     family history includes Breast cancer in her sister; Diabetes in her mother; Heart disease in her sister; Kidney cancer in her sister; Prostate cancer in her brother, brother, and brother.   reports that she quit smoking about 59 years ago. Her smoking use included cigarettes. She started smoking about 71 years ago. She has a 18 pack-year smoking history. She has been exposed to tobacco smoke. She has never used smokeless tobacco. She reports that she does not drink alcohol and does not use  drugs.  Allergies   Allergen Reactions   • Codeine Hallucinations     Tolerates hydromorphone   • Amitriptyline Rash   • Amoxicillin-Pot Clavulanate Rash   • Aspirin Unknown - Low Severity     Patient doesn't know why   • Carisoprodol-Aspirin-Codeine Palpitations   • Iodinated Contrast Media Rash   • Latex Rash   • Naproxen Rash   • Nsaids Unknown - Low Severity     UNSURE OF REACTION   • Soma Compound With Codeine [Carisoprodol-Aspirin-Codeine] Rash   • Sulfa Antibiotics Rash   • Tramadol Palpitations     heart races      Physical Activity: Sufficiently Active (4/26/2024)    Exercise Vital Sign    • Days of Exercise per Week: 5 days    • Minutes of Exercise per Session: 30 min          Current Outpatient Medications:   •  acetaminophen (TYLENOL) 500 MG tablet, Take 1 tablet by mouth Every 6 (Six) Hours As Needed for Mild Pain ., Disp: , Rfl:   •  clonazePAM (KlonoPIN) 0.5 MG tablet, Take 1 tablet by mouth 2 (Two) Times a Day As Needed for Anxiety (Grief related anxiety). (Patient not taking: Reported on 6/3/2024), Disp: 10 tablet, Rfl: 0  •  furosemide (LASIX) 40 MG tablet, Take 1 tablet by mouth Daily. (Patient taking differently: Take 1 tablet by mouth 3 (Three) Times a Week.), Disp: 30 tablet, Rfl: 0  •  lidocaine (XYLOCAINE) 5 % ointment, Apply 1 Application topically to the appropriate area as directed Daily., Disp: 50 g, Rfl: 1  •  Magnesium Oxide -Mg Supplement 400 (240 Mg) MG tablet, Take 1 tablet by mouth Daily., Disp: 90 tablet, Rfl: 1  •  mesalamine (APRISO) 0.375 g 24 hr capsule, TAKE FOUR CAPSULES BY MOUTH DAILY, Disp: 360 capsule, Rfl: 0  •  pantoprazole (PROTONIX) 40 MG EC tablet, Take 1 tablet by mouth Daily., Disp: 90 tablet, Rfl: 1  •  potassium chloride (KLOR-CON M20) 20 MEQ CR tablet, Take 1 tablet by mouth Daily., Disp: 30 tablet, Rfl: 0  •  spironolactone (ALDACTONE) 25 MG tablet, Take 0.5 tablets by mouth Daily., Disp: 30 tablet, Rfl: 0  •  tacrolimus (PROTOPIC) 0.1 % ointment, Apply 1  Application topically to the appropriate area as directed 2 (Two) Times a Day., Disp: , Rfl:   •  valACYclovir (VALTREX) 1000 MG tablet, Take 1 tablet by mouth Daily., Disp: , Rfl:   •  vitamin B-12 (CYANOCOBALAMIN) 1000 MCG tablet, Take 1 tablet by mouth Daily., Disp: 90 tablet, Rfl: 1    Objective  Vital Sign Review:   Vitals:    24 1102   BP: 121/65   Pulse: 63   SpO2: 97%     Body mass index is 31.4 kg/m².      24  1102   Weight: 83 kg (183 lb)     Physical Exam:  Constitutional:       Appearance: Normal and healthy appearance.   Neck:      Vascular: No carotid bruit or JVD. JVD normal.   Pulmonary:      Effort: Pulmonary effort is normal.      Breath sounds: Normal breath sounds.   Cardiovascular:      PMI at left midclavicular line. Normal rate. Regular rhythm.   Pulses:     Radial: 2+ bilaterally.  Edema:     Peripheral edema present.     Ankle: bilateral edema of the ankle.     Feet: bilateral edema of the feet.  Abdominal:      Palpations: Abdomen is soft.      Tenderness: There is no abdominal tenderness.   Skin:     General: Skin is warm and dry.   Neurological:      General: No focal deficit present.      Mental Status: Alert and oriented to person, place and time.   Psychiatric:         Behavior: Behavior is cooperative.          DATA REVIEWED:   [id number]  Ventricular-paced complexes  No change from previous tracing    ---------------------------------------------------  ECHO:    Results for orders placed during the hospital encounter of 24    Adult Transthoracic Echo Complete W/ Cont if Necessary Per Protocol    Interpretation Summary  •  Left ventricular systolic function is normal. Left ventricular ejection fraction appears to be 61 - 65%.  •  Left ventricular diastolic function was indeterminate.  •  The right ventricular cavity is mildly dilated. Normal right ventricular systolic function noted.  •  The left atrial cavity is severely dilated.  •  The right atrial cavity is  severely dilated.  •  Saline test results are negative.  •  Mild aortic valve regurgitation is present.  •  Mild mitral valve regurgitation is present.  •  Mild to moderate tricuspid valve regurgitation is present.  •  Calculated right ventricular systolic pressure from tricuspid regurgitation is 38 mmHg.  •  There is no evidence of pericardial effusion.          -----------------------------------------------------  RHC/LHC    No results found for this or any previous visit.      ---------------------------------------------------------------------------  CXR/Imaging:     Imaging Results (Most Recent)       None                -----------------------------------------------------------------------------  CT:   XR Chest 1 View    Result Date: 5/18/2024  No focal pulmonary consolidation. Borderline heart size with mild pulmonary vascular congestion. Follow-up as clinical indications persist.  This report was finalized on 5/18/2024 10:11 AM by Dr. Nas Maharaj M.D on Workstation: [id number]           --------------------------------------------------------------------------------------------------------------    Laboratory evaluations:  Renal Function: Estimated Creatinine Clearance: 37.7 mL/min (by C-G formula based on SCr of 1 mg/dL).    Lab Results   Component Value Date    GLUCOSE 100 (H) 06/06/2024    BUN 23 06/06/2024    CREATININE 1.00 06/06/2024    EGFRIFNONA 77 07/29/2021    EGFRIFAFRI 115 01/24/2022    BCR 23 06/06/2024    K 5.5 (H) 06/06/2024    CO2 24 06/06/2024    CALCIUM 10.5 (H) 06/06/2024    PROTENTOTREF 6.8 05/29/2024    ALBUMIN 3.5 05/29/2024    LABIL2 1.1 05/29/2024    AST 11 05/18/2024    ALT 9 05/18/2024     Lab Results   Component Value Date    WBC 3.24 (L) 05/18/2024    HGB 12.6 05/18/2024    HCT 37.8 05/18/2024    .5 (H) 05/18/2024     (L) 05/18/2024     Lab Results   Component Value Date    HGBA1C 5.4 04/25/2022     Lab Results   Component Value Date    HGBA1C 5.4 04/25/2022  "   HGBA1C 4.90 06/23/2021    HGBA1C 5.30 05/11/2021     Lab Results   Component Value Date    CREATININE 1.00 06/06/2024     Lab Results   Component Value Date    IRON 71 01/03/2020    TIBC 372 01/03/2020    FERRITIN 65.00 09/29/2022       Result Review:  I have personally reviewed the results from the time of this admission to 6/11/2024 22:51 EDT and agree with these findings:  [x]  Laboratory list / accordion  []  Microbiology  [x]  Radiology  [x]  EKG/Telemetry   [x]  Cardiology/Vascular   []  Pathology  [x]  Old records  []  Other:          ReDS VOLUMETRIC ASSESSMENT:  ReDs Vest    Performed by: Brooke Clay APRN  Authorized by: Brooke Clay APRN    ReDS Vest Value: ReDS was attempted but only resulted \"Low quality\"        Assessment & Plan     1. Pulmonary hypertension    2. Chronic heart failure with preserved ejection fraction      Pulmonary hypertension-she has HFpEF and HUGO. ?? PFTs to exclude lung disease, will hopefully have gotten these by next follow-up.   we will focus on GDMT for heart failure.  No current indications today for the initiation of PAH-specific medications.  However, if she has any further interval negative remodeling of her RV, we will certainly have a low threshold for adding.  May consider VQ scan in the future to exclude CTEPH.    NYHA stage C; functional class III.  Today, she is euvolemic and perfused.  We have some room to optimize her medical therapy.  Discussed again SGL2T, the daughter is very concerned that patient is intolerant of a lot of medications.  She is concerned about starting to many medications at once.  She is also concerned about UTI and yeast infections.  Additional education given today regarding SGLT2 class of medication.  For now -today- we will check BMP - stop potasium supplementation as last potassium level was 5.5-we will continue spironolactone but look to increase to 25mg depending on BMP result. Pt will monitor blood pressure and " "symptoms at home.  We will recheck BMP in 5 days.  Patient and daughter are happy with this.  Discussed if she continues to do well we will look to adding SGLT2 at next visit.  We will continue furosemide.          Chronic HFpEF.  Etiology:    NYHA stage C FC-III     Clinical status was assessed and has remained stable.  Treatment intent: curative   ReDS reading was obtained today.  ReDS result: \"Low quality\"       Today, Patient appears euvolemic. and with perfusion. The patient's hemodynamics are currently acceptable. HR is: normal and is at goal. BP/MAP was reviewed and there is room for medication up-titration.  TLVEF: 61 to 65%.     GDMT Assessment:     GDMT changes recommended today:  Increase spironolactone      BB:   Deferred, history of heart block and bradycardia  Monitor heart rate.  Please call the [facility] for HR<50, dizziness, lightheadedness, syncope    A/A/A:   Possible future addition.  Blood pressure 122/65 today.  Although the patient's daughter is concerned Home blood pressures are usually much lower 100s/60s.  We will continue to monitor this.  Encouraged continued home blood pressure monitoring.    [id number]:  Patient and daughter agreeable to consider addition of SGL 2T but would like to wait for now.     MRA:  The patient is FC-NYHA Class III and MRA is indicated.   increase Spironolactone to  25mg daily  Recommended quarterly assessment of GFR and electrolytes   Hold potassium supplementation for now  Avoid salt substitutes containing potassium  Please hold this medication if diarrhea, vomiting, or infection with fever and sweating occurs      Diuretic Regimen:  -DIURETIC:   furosemide (LASIX) 40 mg every day  -Fluid restriction:   -requested  -2000 ml  -Patient has been asked to weigh daily and was provided with a printed diuretic strategy.  -Sodium restriction:   -1,500 mg Na restriction was discussed.      Labs/Diagnostics/Referrals:    Labs -Chem-7       Lifestyle Advice:   - call office if I " gain more than 2 pounds in one day or 5 pounds in one week   - keep legs up while sitting   - use salt in moderation   - watch for swelling in feet, ankles and legs every day   - weigh myself daily   -call for concerning s/sx   -- activity or exercise based on tolerance encouraged     CODE STATUS: FULL              Return in about 4 weeks (around 7/10/2024), or f/u in clinic in 1 month., for Recheck.      Laboratory evaluations:  -Chem-7  -BMP looks good today.  Normal kidney function and normal potassium level 4.6.  We will continue with plan to stop potassium and increase spironolactone to 25 mg.  Patient was contacted with the result results and result message sent through [facility].              Thank you for allowing me to participate in the care of your patient,         Brooke ClaySHAHID  [facility]  06/11/24  22:51 EDT      **Deon Disclaimer:**  Much of this encounter note is an electronic transcription/translation of spoken language to printed text. The electronic translation of spoken language may permit erroneous, or at times, nonsensical words or phrases to be inadvertently transcribed. Although I have reviewed the note for such errors, some may still exist.    The information in this note was reviewed and updated during the visit to be as accurate as possible. As many patients have chronic medical problems, many of their individual problems and ongoing plans do not change significantly from visit to visit.  This information is correct and is consistent with my treatment plan as of today's visit.

## 2024-06-12 NOTE — ADDENDUM NOTE
Encounter addended by: Jessenia Knight MA on: 6/12/2024 4:14 PM   Actions taken: Charge Capture section accepted

## 2024-06-12 NOTE — PATIENT INSTRUCTIONS
We are going to hold the potassium for now.   We will check your labs today, and you will get a call from us regarding your lab results.   If your potassium and kidney function are ok, we will increase the spironolactone to 25mg daily.   If we can increase the spironolactone we will get BMP rechecked in 5 days.   Continue plan with follow-up with hematologist/oncologist in August.     Directions for when to call the clinic reviewed with the patient to include weight gain of 2 to 3 pounds in 24 hours, weight gain of 5 to 10 pounds within 7 days; worsening shortness of breath; worsening lower extremity edema or abdominal distention.

## 2024-06-26 RX ORDER — PANTOPRAZOLE SODIUM 40 MG/1
40 TABLET, DELAYED RELEASE ORAL DAILY
Qty: 90 TABLET | Refills: 1 | Status: SHIPPED | OUTPATIENT
Start: 2024-06-26

## 2024-06-26 NOTE — TELEPHONE ENCOUNTER
Rx Refill Note  Requested Prescriptions     Pending Prescriptions Disp Refills    pantoprazole (PROTONIX) 40 MG EC tablet 90 tablet 1     Sig: Take 1 tablet by mouth Daily.      Last office visit with prescribing clinician: 5/6/2024   Last telemedicine visit with prescribing clinician: Visit date not found   Next office visit with prescribing clinician: 6/26/2024                         Would you like a call back once the refill request has been completed: [] Yes [] No    If the office needs to give you a call back, can they leave a voicemail: [] Yes [] No    Gordon Monroy  06/26/24, 14:29 EDT

## 2024-07-05 ENCOUNTER — TELEPHONE (OUTPATIENT)
Dept: GASTROENTEROLOGY | Facility: CLINIC | Age: 89
End: 2024-07-05
Payer: MEDICARE

## 2024-07-05 RX ORDER — MESALAMINE 1000 MG/1
1000 SUPPOSITORY RECTAL NIGHTLY
Qty: 30 SUPPOSITORY | Refills: 0 | Status: SHIPPED | OUTPATIENT
Start: 2024-07-05 | End: 2024-08-16

## 2024-07-05 NOTE — TELEPHONE ENCOUNTER
Would recommend trial of Canasa suppository if she feels like she could insert this.  I have sent this to her pharmacy

## 2024-07-05 NOTE — TELEPHONE ENCOUNTER
----- Message from [facility] [employer] sent at 7/4/2024 11:34 PM EDT -----  Regarding: Blood in my stool  Contact: [phone]  Good evening Dr English  I'm reaching to let you know I've had blood in my stool since Tuesday and I've had some discomfort in my rectum as well.  Please let me know what I should do if anything.  Just letting you know. Or should I reach out to my Pcp Dr Mega Esparza?  Thank Brittany Villeda

## 2024-07-05 NOTE — TELEPHONE ENCOUNTER
Spoke to the pt as well as her daughter    Pt has been experiencing rectal bleeding and rectal pain since Tuesday.  Pt is reporting seeing blood clots attached to her stool.  She is passing normal, formed stool.  Denies seeing the blood changing the toilet water, just with in the stool.  She denies feeling light headed or dizzy.    She is using the lidocaine ointment and the apriso

## 2024-07-08 NOTE — TELEPHONE ENCOUNTER
Dr English's response sent to pt thru [employer].   Advised pt to let us know if she has questions.

## 2024-07-08 NOTE — TELEPHONE ENCOUNTER
Called pt's daughter and advised of Dr English's note. Verb understanding.     She reports her mother is feeling weak. ADvised if her symptoms worsens or she is concerned , advised to take her mother to ER.  Verb understanding.  Update sent to Dr English.

## 2024-07-08 NOTE — TELEPHONE ENCOUNTER
Called pt's daughter and she reports that her mother is still having rectal bleeding.   She states they were unaware of the canasa supp but will pick them up asap. She reports that her mother does not feel well and she reports her mother is now having constipation and her bottom is sore. She is asking what can she take to help her have a bm.  Advised will send message to Dr English.   Verb understanding.

## 2024-07-08 NOTE — TELEPHONE ENCOUNTER
Hub staff attempted to follow warm transfer process and was unsuccessful     Caller: Amina Diana    Relationship to patient: Emergency Contact    Best call back number: [phone]    Patient is needing: PT'S DAUGHTER IS CALLING IN TO LET DR MOSCOSO KNOW HER MOTHER IS STILL BLEEDING FROM THE RECTUM. PLEASE CALL AND ADVISE. IT'S OK TO [facility].

## 2024-07-12 ENCOUNTER — LAB (OUTPATIENT)
Dept: LAB | Facility: HOSPITAL | Age: 89
End: 2024-07-12
Payer: MEDICARE

## 2024-07-12 ENCOUNTER — HOSPITAL ENCOUNTER (OUTPATIENT)
Dept: CARDIOLOGY | Facility: HOSPITAL | Age: 89
Discharge: HOME | End: 2024-07-12
Payer: MEDICARE

## 2024-07-12 VITALS
OXYGEN SATURATION: 99 % | DIASTOLIC BLOOD PRESSURE: 56 MMHG | HEART RATE: 65 BPM | BODY MASS INDEX: 30.4 KG/M2 | WEIGHT: 177.2 LBS | SYSTOLIC BLOOD PRESSURE: 103 MMHG

## 2024-07-12 DIAGNOSIS — I50.32 CHRONIC HEART FAILURE WITH PRESERVED EJECTION FRACTION (HFPEF): ICD-10-CM

## 2024-07-12 DIAGNOSIS — I27.20 PULMONARY HYPERTENSION: Primary | ICD-10-CM

## 2024-07-12 DIAGNOSIS — I50.32 CHRONIC HEART FAILURE WITH PRESERVED EJECTION FRACTION: ICD-10-CM

## 2024-07-12 LAB
ANION GAP SERPL CALCULATED.3IONS-SCNC: 12.2 MMOL/L (ref 5–15)
BUN SERPL-MCNC: 21 MG/DL (ref 8–23)
BUN/CREAT SERPL: 19.1 (ref 7–25)
CALCIUM SPEC-SCNC: 10.5 MG/DL (ref 8.6–10.5)
CHLORIDE SERPL-SCNC: 102 MMOL/L (ref 98–107)
CO2 SERPL-SCNC: 23.8 MMOL/L (ref 22–29)
CREAT SERPL-MCNC: 1.1 MG/DL (ref 0.57–1)
EGFRCR SERPLBLD CKD-EPI 2021: 48.1 ML/MIN/1.73
GLUCOSE SERPL-MCNC: 109 MG/DL (ref 65–99)
POTASSIUM SERPL-SCNC: 4 MMOL/L (ref 3.5–5.2)
SODIUM SERPL-SCNC: 138 MMOL/L (ref 136–145)

## 2024-07-12 PROCEDURE — 80048 BASIC METABOLIC PNL TOTAL CA: CPT

## 2024-07-12 PROCEDURE — 36415 COLL VENOUS BLD VENIPUNCTURE: CPT

## 2024-07-12 NOTE — LETTER
July 14, 2024       No Recipients    Patient: Brittany Villeda   YOB: 1935   Date of Visit: 7/12/2024     Dear Mary Grace Culp MD:       Thank you for referring Brittany Villeda to me for evaluation. Below are the relevant portions of my assessment and plan of care.    If you have questions, please do not hesitate to call me. I look forward to following Brittany along with you.         Sincerely,        SHAHID Bazan        CC:   No Recipients    Brooke Clay APRN  07/14/24 2038  Signed    [facility]    Provider, [place] [address]    Thank you for asking me to see Brittany Villeda.     History of Present Illness    1. Pulmonary hypertension  2. HFpEF    Subjective     Brittany Damian a 89 y.o. female who presents today for PAH and HFpEF.     The patient's most recent 2D TTE showed evidence of pulmonary hypertension and right-sided involvement.  She does have biatrial dilatation.  She was diagnosed with HUGO. She does not use a CPAP. She was diagnosed with complete heart block in the past and required placement of a PPM.  She also was diagnosed with COPD.  She does not routinely follow with pulmonary medicine.  She also was diagnosed with MGUS.    She does have follow-up scheduled now with heme-onc in August.  The patient continues to report exercise intolerance.  Reports chronic bilateral lower extremity edema that is unchanged.  Denies any abdominal distention.    She does report exertional dyspnea/shortness of breath.   Denies any PND.             Review of Systems - Review of Systems   Constitutional: Negative for weight gain and weight loss.   Cardiovascular:  Positive for dyspnea on exertion. Negative for chest pain, orthopnea and paroxysmal nocturnal dyspnea.   Respiratory:  Positive for shortness of breath.    Gastrointestinal:  Negative for bloating.          Patient Active Problem List    Diagnosis   • Non-toxic multinodular goiter   • Chronic midline low back pain with right-sided sciatica   • Essential hypertension   • Gastroesophageal reflux disease without esophagitis   • Cataract   • Pulmonary hypertension   • Diastolic dysfunction   • HUGO (obstructive sleep apnea)   • Trifascicular block   • MGUS (monoclonal gammopathy of unknown significance)   • Ulcerative rectosigmoiditis without complication   • Lichen sclerosus   • Heart block   • Sleep-related hypoxia   • Bradycardia   • History of atrial fibrillation   • Pacemaker   • Paroxysmal SVT (supraventricular tachycardia)   • History of chest pain   • Palpitations   • Paroxysmal atrial fibrillation   • Ascending aortic aneurysm   • Mediastinal lymphadenopathy   • Anemia   • Obesity (BMI 30-39.9)   • Generalized weakness   • Dysautonomia   • Hypercalcemia   • Hyperparathyroidism   • Choledocholithiasis   • Duodenal ulcer   • Hemorrhagic gastritis   • Exertional dyspnea   • COPD (chronic obstructive pulmonary disease)   • Osteoarthritis of glenohumeral joint   • ICH (intracerebral hemorrhage)   • Lower GI bleed   • Thrombocytopenia   • Lichen sclerosus of female genitalia   • Senile atrophic vaginitis   • Left inguinal hernia   • Vulvar pain   • Bilateral lower extremity edema   • Chronic heart failure with preserved ejection fraction   • Acute renal insufficiency   • Dysfunction of right cardiac ventricle     family history includes Breast cancer in her sister; Diabetes in her mother; Heart disease in her sister; Kidney cancer in her sister; Prostate cancer in her brother, brother, and brother.   reports that she quit smoking about 59 years ago. Her smoking use included cigarettes. She started smoking about 71 years ago. She has a 18 pack-year smoking history. She has been exposed to tobacco smoke. She has never used smokeless tobacco. She reports that she does not drink alcohol and does not use drugs.  Allergies   Allergen Reactions   • Codeine  Hallucinations     Tolerates hydromorphone   • Amitriptyline Rash   • Amoxicillin-Pot Clavulanate Rash   • Aspirin Unknown - Low Severity     Patient doesn't know why   • Carisoprodol-Aspirin-Codeine Palpitations   • Iodinated Contrast Media Rash   • Latex Rash   • Naproxen Rash   • Nsaids Unknown - Low Severity     UNSURE OF REACTION   • Soma Compound With Codeine [Carisoprodol-Aspirin-Codeine] Rash   • Sulfa Antibiotics Rash   • Tramadol Palpitations     heart races      Physical Activity: Sufficiently Active (4/26/2024)    Exercise Vital Sign    • Days of Exercise per Week: 5 days    • Minutes of Exercise per Session: 30 min          Current Outpatient Medications:   •  acetaminophen (TYLENOL) 500 MG tablet, Take 1 tablet by mouth Every 6 (Six) Hours As Needed for Mild Pain ., Disp: , Rfl:   •  ELDERBERRY PO, Take 2 tablets by mouth Daily., Disp: , Rfl:   •  furosemide (LASIX) 40 MG tablet, Take 0.5 tablets by mouth Daily. Taking 20mg, Disp: , Rfl:   •  lidocaine (XYLOCAINE) 5 % ointment, Apply 1 Application topically to the appropriate area as directed Daily., Disp: 50 g, Rfl: 1  •  Magnesium Oxide -Mg Supplement 400 (240 Mg) MG tablet, Take 1 tablet by mouth Daily., Disp: 90 tablet, Rfl: 1  •  mesalamine (APRISO) 0.375 g 24 hr capsule, TAKE FOUR CAPSULES BY MOUTH DAILY, Disp: 360 capsule, Rfl: 0  •  mesalamine (CANASA) 1000 MG suppository, Insert 1 suppository into the rectum Every Night for 42 days., Disp: 30 suppository, Rfl: 0  •  pantoprazole (PROTONIX) 40 MG EC tablet, Take 1 tablet by mouth Daily., Disp: 90 tablet, Rfl: 1  •  spironolactone (ALDACTONE) 25 MG tablet, Take 1 tablet by mouth Daily., Disp: 30 tablet, Rfl: 11  •  tacrolimus (PROTOPIC) 0.1 % ointment, Apply 1 Application topically to the appropriate area as directed 2 (Two) Times a Day., Disp: , Rfl:   •  valACYclovir (VALTREX) 1000 MG tablet, Take 1 tablet by mouth Daily., Disp: , Rfl:   •  vitamin B-12 (CYANOCOBALAMIN) 1000 MCG tablet, Take 1  tablet by mouth Daily., Disp: 90 tablet, Rfl: 1    Objective  Vital Sign Review:   Vitals:    24 1105   BP: 103/56   Pulse: 65   SpO2: 99%       Body mass index is 30.4 kg/m².      24  1105   Weight: 80.4 kg (177 lb 3.2 oz)       Physical Exam:  Constitutional:       Appearance: Normal and healthy appearance.   Neck:      Vascular: No carotid bruit or JVD. JVD normal.   Pulmonary:      Effort: Pulmonary effort is normal.      Breath sounds: Normal breath sounds.   Cardiovascular:      PMI at left midclavicular line. Normal rate. Regular rhythm.   Pulses:     Radial: 2+ bilaterally.  Edema:     Peripheral edema present.     Ankle: bilateral edema of the ankle.     Feet: bilateral edema of the feet.  Abdominal:      Palpations: Abdomen is soft.      Tenderness: There is no abdominal tenderness.   Skin:     General: Skin is warm and dry.   Neurological:      General: No focal deficit present.      Mental Status: Alert and oriented to person, place and time.   Psychiatric:         Behavior: Behavior is cooperative.          DATA REVIEWED:   [id number]  Ventricular-paced complexes  No change from previous tracing    ---------------------------------------------------  ECHO:    Results for orders placed during the hospital encounter of 24    Adult Transthoracic Echo Complete W/ Cont if Necessary Per Protocol    Interpretation Summary  •  Left ventricular systolic function is normal. Left ventricular ejection fraction appears to be 61 - 65%.  •  Left ventricular diastolic function was indeterminate.  •  The right ventricular cavity is mildly dilated. Normal right ventricular systolic function noted.  •  The left atrial cavity is severely dilated.  •  The right atrial cavity is severely dilated.  •  Saline test results are negative.  •  Mild aortic valve regurgitation is present.  •  Mild mitral valve regurgitation is present.  •  Mild to moderate tricuspid valve regurgitation is present.  •   Calculated right ventricular systolic pressure from tricuspid regurgitation is 38 mmHg.  •  There is no evidence of pericardial effusion.          -----------------------------------------------------  RHC/LHC    No results found for this or any previous visit.      ---------------------------------------------------------------------------  CXR/Imaging:     Imaging Results (Most Recent)       None                -----------------------------------------------------------------------------  CT:   No radiology results for the last 30 days.        --------------------------------------------------------------------------------------------------------------    Laboratory evaluations:  Renal Function: Estimated Creatinine Clearance: 35.6 mL/min (A) (by C-G formula based on SCr of 1.1 mg/dL (H)).    Lab Results   Component Value Date    GLUCOSE 109 (H) 07/12/2024    BUN 21 07/12/2024    CREATININE 1.10 (H) 07/12/2024    EGFRIFNONA 77 07/29/2021    EGFRIFAFRI 115 01/24/2022    BCR 19.1 07/12/2024    K 4.0 07/12/2024    CO2 23.8 07/12/2024    CALCIUM 10.5 07/12/2024    PROTENTOTREF 6.8 05/29/2024    ALBUMIN 3.5 05/29/2024    LABIL2 1.1 05/29/2024    AST 11 05/18/2024    ALT 9 05/18/2024     Lab Results   Component Value Date    WBC 3.24 (L) 05/18/2024    HGB 12.6 05/18/2024    HCT 37.8 05/18/2024    .5 (H) 05/18/2024     (L) 05/18/2024     Lab Results   Component Value Date    HGBA1C 5.4 04/25/2022     Lab Results   Component Value Date    HGBA1C 5.4 04/25/2022    HGBA1C 4.90 06/23/2021    HGBA1C 5.30 05/11/2021     Lab Results   Component Value Date    CREATININE 1.10 (H) 07/12/2024     Lab Results   Component Value Date    IRON 71 01/03/2020    TIBC 372 01/03/2020    FERRITIN 65.00 09/29/2022       Result Review:  I have personally reviewed the results from the time of this admission to 7/14/2024 20:17 EDT and agree with these findings:  [x]  Laboratory list / accordion  []  Microbiology  [x]   "Radiology  [x]  EKG/Telemetry   [x]  Cardiology/Vascular   []  Pathology  [x]  Old records  []  Other:          ReDS VOLUMETRIC ASSESSMENT:  resulted \"low quality\" ---attempted twice      Assessment & Plan     1. Pulmonary hypertension    2. Chronic heart failure with preserved ejection fraction        Pulmonary hypertension-she has HFpEF and HUGO. PFTs to exclude lung disease do not have appeared to be done yet, we will continue with plan for these.   we will focus on GDMT for heart failure.  No current indications today for the initiation of PAH-specific medications.  However, if she has any further interval negative remodeling of her RV. May consider VQ scan in the future to exclude CTEPH.    NYHA stage C; functional class III.  Today, she is mildly hypervolemic--reporting more shortness of breath, more exertional dyspnea.  We will increase lasix to 40mg for the next 3 days, then return to 20meq daily. We will continue spironolactone at 25mg as she has done well with this so far. BMP checked today, renal function is stable and potassium was 4.0. GDMT as listed below          Chronic HFpEF.  Etiology:    NYHA stage C FC-III     Clinical status was assessed and has remained stable.  Treatment intent: curative   ReDS reading was not obtained today.  ReDS result: would only read low quality       Today, Patient is mildly volume overloaded and with perfusion. The patient's hemodynamics are currently acceptable. HR is: normal and is at goal. BP/MAP was reviewed and there is room for medication up-titration.  TLVEF: 61 to 65%.     GDMT Assessment:     GDMT changes recommended today:  increase lasix for the next 3 days  diuretic.      BB:   Deferred, history of heart block and bradycardia  Monitor heart rate.  Please call the HF for HR<50, dizziness, lightheadedness, syncope    A/A/A:   Hemodynamics do not allow at this time.  Blood pressure 103/56 today.      CMS1K-Q:  Not a candidate due to having severe vaginal yeast " infections.      MRA:  The patient is FC-NYHA Class III and MRA is indicated.   continue Spironolactone to  25mg daily  Recommended quarterly assessment of GFR and electrolytes   Continue to hold potassium supplementation BMP today potassium was 4  Avoid salt substitutes containing potassium  Please hold this medication if diarrhea, vomiting, or infection with fever and sweating occurs      Diuretic Regimen:  -DIURETIC:   furosemide (LASIX) 20 mg every day---she is mildly hypervolemic, we will increase to 40 daily for the next 3 days then return to 20mg daily  -Fluid restriction:   -requested  -2000 ml  -Patient has been asked to weigh daily and was provided with a printed diuretic strategy.  -Sodium restriction:   -1,500 mg Na restriction was discussed.      Labs/Diagnostics/Referrals:    Labs -Chem-7       Lifestyle Advice:   - call office if I gain more than 2 pounds in one day or 5 pounds in one week   - keep legs up while sitting   - use salt in moderation   - watch for swelling in feet, ankles and legs every day   - weigh myself daily   -call for concerning s/sx   -- activity or exercise based on tolerance encouraged     CODE STATUS: FULL              Return in about 1 month (around 8/12/2024) for Next scheduled follow up.      Laboratory evaluations:  -Chem-7              Thank you for allowing me to participate in the care of your patient,         Brooke ClaySHAHID  [facility]  07/14/24  22:51 EDT      **Deon Disclaimer:**  Much of this encounter note is an electronic transcription/translation of spoken language to printed text. The electronic translation of spoken language may permit erroneous, or at times, nonsensical words or phrases to be inadvertently transcribed. Although I have reviewed the note for such errors, some may still exist.    The information in this note was reviewed and updated during the visit to be as accurate as possible. As many patients have chronic medical  problems, many of their individual problems and ongoing plans do not change significantly from visit to visit.  This information is correct and is consistent with my treatment plan as of today's visit.

## 2024-07-12 NOTE — PROGRESS NOTES
[facility]    Provider, [place] [address]    Thank you for asking me to see Brittany Villeda.     History of Present Illness    1. Pulmonary hypertension  2. HFpEF    Subjective      Brittany Damian a 89 y.o. female who presents today for PAH and HFpEF.     The patient's most recent 2D TTE showed evidence of pulmonary hypertension and right-sided involvement.  She does have biatrial dilatation.  She was diagnosed with HUGO. She does not use a CPAP. She was diagnosed with complete heart block in the past and required placement of a PPM.  She also was diagnosed with COPD.  She does not routinely follow with pulmonary medicine.  She also was diagnosed with MGUS.    She does have follow-up scheduled now with heme-onc in August.  The patient continues to report exercise intolerance.  Reports chronic bilateral lower extremity edema that is unchanged.  Denies any abdominal distention.    She does report exertional dyspnea/shortness of breath.   Denies any PND.             Review of Systems - Review of Systems   Constitutional: Negative for weight gain and weight loss.   Cardiovascular:  Positive for dyspnea on exertion. Negative for chest pain, orthopnea and paroxysmal nocturnal dyspnea.   Respiratory:  Positive for shortness of breath.    Gastrointestinal:  Negative for bloating.          Patient Active Problem List   Diagnosis    Non-toxic multinodular goiter    Chronic midline low back pain with right-sided sciatica    Essential hypertension    Gastroesophageal reflux disease without esophagitis    Cataract    Pulmonary hypertension    Diastolic dysfunction    HUGO (obstructive sleep apnea)    Trifascicular block    MGUS (monoclonal gammopathy of unknown significance)    Ulcerative rectosigmoiditis without complication    Lichen sclerosus    Heart block    Sleep-related hypoxia    Bradycardia    History of atrial fibrillation    Pacemaker     Paroxysmal SVT (supraventricular tachycardia)    History of chest pain    Palpitations    Paroxysmal atrial fibrillation    Ascending aortic aneurysm    Mediastinal lymphadenopathy    Anemia    Obesity (BMI 30-39.9)    Generalized weakness    Dysautonomia    Hypercalcemia    Hyperparathyroidism    Choledocholithiasis    Duodenal ulcer    Hemorrhagic gastritis    Exertional dyspnea    COPD (chronic obstructive pulmonary disease)    Osteoarthritis of glenohumeral joint    ICH (intracerebral hemorrhage)    Lower GI bleed    Thrombocytopenia    Lichen sclerosus of female genitalia    Senile atrophic vaginitis    Left inguinal hernia    Vulvar pain    Bilateral lower extremity edema    Chronic heart failure with preserved ejection fraction    Acute renal insufficiency    Dysfunction of right cardiac ventricle     family history includes Breast cancer in her sister; Diabetes in her mother; Heart disease in her sister; Kidney cancer in her sister; Prostate cancer in her brother, brother, and brother.   reports that she quit smoking about 59 years ago. Her smoking use included cigarettes. She started smoking about 71 years ago. She has a 18 pack-year smoking history. She has been exposed to tobacco smoke. She has never used smokeless tobacco. She reports that she does not drink alcohol and does not use drugs.  Allergies   Allergen Reactions    Codeine Hallucinations     Tolerates hydromorphone    Amitriptyline Rash    Amoxicillin-Pot Clavulanate Rash    Aspirin Unknown - Low Severity     Patient doesn't know why    Carisoprodol-Aspirin-Codeine Palpitations    Iodinated Contrast Media Rash    Latex Rash    Naproxen Rash    Nsaids Unknown - Low Severity     UNSURE OF REACTION    Soma Compound With Codeine [Carisoprodol-Aspirin-Codeine] Rash    Sulfa Antibiotics Rash    Tramadol Palpitations     heart races      Physical Activity: Sufficiently Active (4/26/2024)    Exercise Vital Sign     Days of Exercise per Week: 5 days      Minutes of Exercise per Session: 30 min          Current Outpatient Medications:     acetaminophen (TYLENOL) 500 MG tablet, Take 1 tablet by mouth Every 6 (Six) Hours As Needed for Mild Pain ., Disp: , Rfl:     ELDERBERRY PO, Take 2 tablets by mouth Daily., Disp: , Rfl:     furosemide (LASIX) 40 MG tablet, Take 0.5 tablets by mouth Daily. Taking 20mg, Disp: , Rfl:     lidocaine (XYLOCAINE) 5 % ointment, Apply 1 Application topically to the appropriate area as directed Daily., Disp: 50 g, Rfl: 1    Magnesium Oxide -Mg Supplement 400 (240 Mg) MG tablet, Take 1 tablet by mouth Daily., Disp: 90 tablet, Rfl: 1    mesalamine (APRISO) 0.375 g 24 hr capsule, TAKE FOUR CAPSULES BY MOUTH DAILY, Disp: 360 capsule, Rfl: 0    mesalamine (CANASA) 1000 MG suppository, Insert 1 suppository into the rectum Every Night for 42 days., Disp: 30 suppository, Rfl: 0    pantoprazole (PROTONIX) 40 MG EC tablet, Take 1 tablet by mouth Daily., Disp: 90 tablet, Rfl: 1    spironolactone (ALDACTONE) 25 MG tablet, Take 1 tablet by mouth Daily., Disp: 30 tablet, Rfl: 11    tacrolimus (PROTOPIC) 0.1 % ointment, Apply 1 Application topically to the appropriate area as directed 2 (Two) Times a Day., Disp: , Rfl:     valACYclovir (VALTREX) 1000 MG tablet, Take 1 tablet by mouth Daily., Disp: , Rfl:     vitamin B-12 (CYANOCOBALAMIN) 1000 MCG tablet, Take 1 tablet by mouth Daily., Disp: 90 tablet, Rfl: 1    Objective   Vital Sign Review:   Vitals:    07/12/24 1105   BP: 103/56   Pulse: 65   SpO2: 99%       Body mass index is 30.4 kg/m².      07/12/24  1105   Weight: 80.4 kg (177 lb 3.2 oz)       Physical Exam:  Constitutional:       Appearance: Normal and healthy appearance.   Neck:      Vascular: No carotid bruit or JVD. JVD normal.   Pulmonary:      Effort: Pulmonary effort is normal.      Breath sounds: Normal breath sounds.   Cardiovascular:      PMI at left midclavicular line. Normal rate. Regular rhythm.   Pulses:     Radial: 2+  bilaterally.  Edema:     Peripheral edema present.     Ankle: bilateral edema of the ankle.     Feet: bilateral edema of the feet.  Abdominal:      Palpations: Abdomen is soft.      Tenderness: There is no abdominal tenderness.   Skin:     General: Skin is warm and dry.   Neurological:      General: No focal deficit present.      Mental Status: Alert and oriented to person, place and time.   Psychiatric:         Behavior: Behavior is cooperative.          DATA REVIEWED:   [id number]  Ventricular-paced complexes  No change from previous tracing    ---------------------------------------------------  ECHO:    Results for orders placed during the hospital encounter of 24    Adult Transthoracic Echo Complete W/ Cont if Necessary Per Protocol    Interpretation Summary    Left ventricular systolic function is normal. Left ventricular ejection fraction appears to be 61 - 65%.    Left ventricular diastolic function was indeterminate.    The right ventricular cavity is mildly dilated. Normal right ventricular systolic function noted.    The left atrial cavity is severely dilated.    The right atrial cavity is severely dilated.    Saline test results are negative.    Mild aortic valve regurgitation is present.    Mild mitral valve regurgitation is present.    Mild to moderate tricuspid valve regurgitation is present.    Calculated right ventricular systolic pressure from tricuspid regurgitation is 38 mmHg.    There is no evidence of pericardial effusion.          -----------------------------------------------------  RHC/LHC    No results found for this or any previous visit.      ---------------------------------------------------------------------------  CXR/Imaging:     Imaging Results (Most Recent)       None                -----------------------------------------------------------------------------  CT:   No radiology results for the last 30  "days.        --------------------------------------------------------------------------------------------------------------    Laboratory evaluations:  Renal Function: Estimated Creatinine Clearance: 35.6 mL/min (A) (by C-G formula based on SCr of 1.1 mg/dL (H)).    Lab Results   Component Value Date    GLUCOSE 109 (H) 07/12/2024    BUN 21 07/12/2024    CREATININE 1.10 (H) 07/12/2024    EGFRIFNONA 77 07/29/2021    EGFRIFAFRI 115 01/24/2022    BCR 19.1 07/12/2024    K 4.0 07/12/2024    CO2 23.8 07/12/2024    CALCIUM 10.5 07/12/2024    PROTENTOTREF 6.8 05/29/2024    ALBUMIN 3.5 05/29/2024    LABIL2 1.1 05/29/2024    AST 11 05/18/2024    ALT 9 05/18/2024     Lab Results   Component Value Date    WBC 3.24 (L) 05/18/2024    HGB 12.6 05/18/2024    HCT 37.8 05/18/2024    .5 (H) 05/18/2024     (L) 05/18/2024     Lab Results   Component Value Date    HGBA1C 5.4 04/25/2022     Lab Results   Component Value Date    HGBA1C 5.4 04/25/2022    HGBA1C 4.90 06/23/2021    HGBA1C 5.30 05/11/2021     Lab Results   Component Value Date    CREATININE 1.10 (H) 07/12/2024     Lab Results   Component Value Date    IRON 71 01/03/2020    TIBC 372 01/03/2020    FERRITIN 65.00 09/29/2022       Result Review:  I have personally reviewed the results from the time of this admission to 7/14/2024 20:17 EDT and agree with these findings:  [x]  Laboratory list / accordion  []  Microbiology  [x]  Radiology  [x]  EKG/Telemetry   [x]  Cardiology/Vascular   []  Pathology  [x]  Old records  []  Other:          ReDS VOLUMETRIC ASSESSMENT:  resulted \"low quality\" ---attempted twice      Assessment & Plan      1. Pulmonary hypertension    2. Chronic heart failure with preserved ejection fraction        Pulmonary hypertension-she has HFpEF and HUGO. PFTs to exclude lung disease do not have appeared to be done yet, we will continue with plan for these.   we will focus on GDMT for heart failure.  No current indications today for the initiation of " PAH-specific medications.  However, if she has any further interval negative remodeling of her RV. May consider VQ scan in the future to exclude CTEPH.    NYHA stage C; functional class III.  Today, she is mildly hypervolemic--reporting more shortness of breath, more exertional dyspnea.  We will increase lasix to 40mg for the next 3 days, then return to 20meq daily. We will continue spironolactone at 25mg as she has done well with this so far. BMP checked today, renal function is stable and potassium was 4.0. GDMT as listed below          Chronic HFpEF.  Etiology:    NYHA stage C FC-III     Clinical status was assessed and has remained stable.  Treatment intent: curative   ReDS reading was not obtained today.  ReDS result: would only read low quality       Today, Patient is mildly volume overloaded and with perfusion. The patient's hemodynamics are currently acceptable. HR is: normal and is at goal. BP/MAP was reviewed and there is room for medication up-titration.  TLVEF: 61 to 65%.     GDMT Assessment:     GDMT changes recommended today:  increase lasix for the next 3 days  diuretic.      BB:   Deferred, history of heart block and bradycardia  Monitor heart rate.  Please call the [facility] for HR<50, dizziness, lightheadedness, syncope    A/A/A:   Hemodynamics do not allow at this time.  Blood pressure 103/56 today.      DSK1K-B:  Not a candidate due to having severe vaginal yeast infections.      MRA:  The patient is FC-NYHA Class III and MRA is indicated.   continue Spironolactone to  25mg daily  Recommended quarterly assessment of GFR and electrolytes   Continue to hold potassium supplementation BMP today potassium was 4  Avoid salt substitutes containing potassium  Please hold this medication if diarrhea, vomiting, or infection with fever and sweating occurs      Diuretic Regimen:  -DIURETIC:   furosemide (LASIX) 20 mg every day---she is mildly hypervolemic, we will increase to 40 daily for the next 3 days then  return to 20mg daily  -Fluid restriction:   -requested  -2000 ml  -Patient has been asked to weigh daily and was provided with a printed diuretic strategy.  -Sodium restriction:   -1,500 mg Na restriction was discussed.      Labs/Diagnostics/Referrals:    Labs -Chem-7       Lifestyle Advice:   - call office if I gain more than 2 pounds in one day or 5 pounds in one week   - keep legs up while sitting   - use salt in moderation   - watch for swelling in feet, ankles and legs every day   - weigh myself daily   -call for concerning s/sx   -- activity or exercise based on tolerance encouraged     CODE STATUS: FULL              Return in about 1 month (around 8/12/2024) for Next scheduled follow up.      Laboratory evaluations:  -Chem-7              Thank you for allowing me to participate in the care of your patient,         Brooke ClaySHAHID  [facility]  07/14/24  22:51 EDT      **Deon Disclaimer:**  Much of this encounter note is an electronic transcription/translation of spoken language to printed text. The electronic translation of spoken language may permit erroneous, or at times, nonsensical words or phrases to be inadvertently transcribed. Although I have reviewed the note for such errors, some may still exist.    The information in this note was reviewed and updated during the visit to be as accurate as possible. As many patients have chronic medical problems, many of their individual problems and ongoing plans do not change significantly from visit to visit.  This information is correct and is consistent with my treatment plan as of today's visit.

## 2024-07-12 NOTE — PATIENT INSTRUCTIONS
Directions for when to call the clinic reviewed with the patient to include weight gain of 2 to 3 pounds in 24 hours, weight gain of 5 to 10 pounds within 7 days; worsening shortness of breath; worsening lower extremity edema or abdominal distention.    Increase lasix to 40mg daily for the next 3 days, then decrease back to 20mg a day.

## 2024-07-15 NOTE — TELEPHONE ENCOUNTER
Body Location Override (Optional - Billing Will Still Be Based On Selected Body Map Location If Applicable): Right chin The 2 polyp(s) biopsies showed adenomatous change. This is not cancerous but is considered potentially precancerous. Would defer further colonoscopy due to age    Continue apriso for UC- f/u with me in [place]   Mohs Case Number: [id number] Date Of Previous Biopsy (Optional): 5/31/24 Previous Accession (Optional): [id number] Biopsy Photograph Reviewed: Yes Referring Physician (Optional): Cira Biu M.D. Consent Type: Consent 1 (Standard) Eye Shield Used: No Surgeon Performing Repair (Optional): Patrick Santo Jr, MD Initial Size Of Lesion: 0.5 X Size Of Lesion In Cm (Optional): 0.8 Number Of Stages: 2 Primary Defect Length In Cm (Final Defect Size - Required For Flaps/Grafts): 0.9 Primary Defect Width In Cm (Final Defect Size - Required For Flaps/Grafts): 1.4 Primary Defect Depth In Cm (Optional But Required For Some Insurers): 0 Repair Type: Complex Repair Which Instrument Did You Use For Dermabrasion?: Wire Brush Which Eyelid Repair Cpt Are You Using?: 81169 Oculoplastic Surgeon Procedure Text (A): After obtaining clear surgical margins the patient was sent to oculoplastics for surgical repair.  The patient understands they will receive post-surgical care and follow-up from the referring physician's office. Otolaryngologist Procedure Text (A): After obtaining clear surgical margins the patient was sent to otolaryngology for surgical repair.  The patient understands they will receive post-surgical care and follow-up from the referring physician's office. Plastic Surgeon Procedure Text (A): After obtaining clear surgical margins the patient was sent to plastics for surgical repair.  The patient understands they will receive post-surgical care and follow-up from the referring physician's office. Mid-Level Procedure Text (A): After obtaining clear surgical margins the patient was sent to a mid-level provider for surgical repair.  The patient understands they will receive post-surgical care and follow-up from the mid-level provider. Provider Procedure Text (A): After obtaining clear surgical margins the defect was repaired by another provider. Asc Procedure Text (A): After obtaining clear surgical margins the patient was sent to an ASC for surgical repair.  The patient understands they will receive post-surgical care and follow-up from the ASC physician. Simple / Intermediate / Complex Repair - Final Wound Length In Cm: 2.8 Suturegard Retention Suture: 2-0 Nylon Retention Suture Bite Size: 3 mm Length To Time In Minutes Device Was In Place: 10 Number Of Hemigard Strips Per Side: 1 Undermining Type: Entire Wound Debridement Text: The wound edges were debrided prior to proceeding with the closure to facilitate wound healing. Helical Rim Text: The closure involved the helical rim. Vermilion Border Text: The closure involved the vermilion border. Nostril Rim Text: The closure involved the nostril rim. Retention Suture Text: Retention sutures were placed to support the closure and prevent dehiscence. Location Indication Override (Is Already Calculated Based On Selected Body Location): Area H Area H Indication Text: Tumors in this location are included in Area H (eyelids, eyebrows, nose, lips, chin, ear, pre-auricular, post-auricular, temple, genitalia, hands, feet, ankles and areola).  Tissue conservation is critical in these anatomic locations. Area M Indication Text: Tumors in this location are included in Area M (cheek, forehead, scalp, neck, jawline and pretibial skin).  Mohs surgery is indicated for tumors in these anatomic locations. Area L Indication Text: Tumors in this location are included in Area L (trunk and extremities).  Mohs surgery is indicated for larger tumors, or tumors with aggressive histologic features, in these anatomic locations. Tumor Debulked?: curette Depth Of Tumor Invasion (For Histology): dermis Perineural Invasion (For Histology - Be Specific If Possible): absent Surgical Defect Length In Cm (Optional): 0.7 Surgical Defect Width In Cm (Optional): 1.1 Special Stains Stage 1 - Results: Base On Clearance Noted Above Histology Selection Override (Optional- Will Default To Parent Diagnosis If N/A): Infiltrative Basal Cell Carcinoma Stage 2: Additional Anesthesia Type: 1% lidocaine with 1:100,000 epinephrine and a 1:10 solution of 8.4% sodium bicarbonate Staging Info: By selecting yes to the question above you will include information on AJCC 8 tumor staging in your Mohs note. Information on tumor staging will be automatically added for SCCs on the head and neck. AJCC 8 includes tumor size, tumor depth, perineural involvement and bone invasion. Tumor Depth: Less than 6mm from granular layer and no invasion beyond the subcutaneous fat Was The Patient On Physician Recommended Anticoagulation Therapy?: Please Select the Appropriate Response Medical Necessity Statement: Based on my medical judgement, Mohs surgery is the most appropriate treatment for this cancer compared to other treatments. After reviewing the pertinent pathology report, physical exam findings and the various treatment options for skin cancer treatment, standard excision and/or destruction technique methods are not clinically sufficient treatment options. To prevent the risk of compromising surgical cure and reconstruction, prompt microscopic examination of the surgical margins is necessary. Based on the given indications, maximum conservation of healthy tissue, and high cure rate, Mohs surgery is the most appropriate treatment for this cancer. Various treatment options for skin cancer treatment, including but not limited to curettage, excision with frozen sections, excision with permanent sections, photodynamic therapy, radiation therapy, topical chemotherapeutic agents, topical imiquimod, and foregoing treatment were discussed in depth with the patient. Alternatives Discussed Intro (Do Not Add Period): I discussed alternative treatments to Mohs surgery and specifically discussed the risks and benefits of Consent 1/Introductory Paragraph: The rationale for Mohs was explained to the patient. The risks, benefits, and alternatives to therapy were discussed in detail. Specifically, the risks of infection, scarring, bleeding, prolonged wound healing, incomplete removal, allergy to anesthesia, nerve injury, and recurrence were addressed. Prior the procedure, the treatment site was clearly identified and confirmed by the patient. The treatment site was then marked with a sterile surgical marking pen and the patient confirmed that the marked site was correct. The patient voiced agreement that Mohs surgery is the best treatment option for their skin cancer. No guarantees were made or implied to the patient. \\n\\nThe patient had an opportunity to ask questions about their procedure. All questions were answered to the patient's satisfaction. I asked the patient if there were any further questions about the procedure and the patient said \"No.\" All components of Universal Protocol/PAUSE Rule completed. Informed verbal and written consent was obtained. Consent 2/Introductory Paragraph: Mohs surgery was explained to the patient and consent was obtained. The risks, benefits and alternatives to therapy were discussed in detail. Specifically, the risks of infection, scarring, bleeding, prolonged wound healing, incomplete removal, allergy to anesthesia, nerve injury and recurrence were addressed. Prior to the procedure, the treatment site was clearly identified and confirmed by the patient. All components of Universal Protocol/PAUSE Rule completed. Consent 3/Introductory Paragraph: I gave the patient a chance to ask questions they had about the procedure.  Following this I explained the Mohs procedure and consent was obtained. The risks, benefits and alternatives to therapy were discussed in detail. Specifically, the risks of infection, scarring, bleeding, prolonged wound healing, incomplete removal, allergy to anesthesia, nerve injury and recurrence were addressed. Prior to the procedure, the treatment site was clearly identified and confirmed by the patient. All components of Universal Protocol/PAUSE Rule completed. Consent (Temporal Branch)/Introductory Paragraph: The rationale for Mohs was explained to the patient. The risks, benefits, and alternatives to therapy were discussed in detail. Specifically, the risks of infection, scarring, bleeding, prolonged wound healing, incomplete removal, allergy to anesthesia, nerve injury, damage to the temporal branch of the facial nerve, and recurrence were addressed. Prior the procedure, the treatment site was clearly identified and confirmed by the patient. The treatment site was then marked with a sterile surgical marking pen and the patient confirmed that the marked site was correct. The patient voiced agreement that Mohs surgery is the best treatment option for their skin cancer. No guarantees were made or implied to the patient. \\n\\nThe patient had an opportunity to ask questions about their procedure. All questions were answered to the patient's satisfaction. I asked the patient if there were any further questions about the procedure and the patient said \"No.\" All components of Universal Protocol/PAUSE Rule completed. Informed verbal and written consent was obtained. Consent (Marginal Mandibular)/Introductory Paragraph: The rationale for Mohs was explained to the patient. The risks, benefits, and alternatives to therapy were discussed in detail. Specifically, the risks of infection, scarring, bleeding, prolonged wound healing, incomplete removal, allergy to anesthesia, nerve injury, damage to the marginal mandibular branch of the facial nerve, and recurrence were addressed. Prior the procedure, the treatment site was clearly identified and confirmed by the patient. The treatment site was then marked with a sterile surgical marking pen and the patient confirmed that the marked site was correct. The patient voiced agreement that Mohs surgery is the best treatment option for their skin cancer. No guarantees were made or implied to the patient. \\n\\nThe patient had an opportunity to ask questions about their procedure. All questions were answered to the patient's satisfaction. I asked the patient if there were any further questions about the procedure and the patient said \"No.\" All components of Universal Protocol/PAUSE Rule completed. Informed verbal and written consent was obtained. Consent (Spinal Accessory)/Introductory Paragraph: The rationale for Mohs was explained to the patient. The risks, benefits, and alternatives to therapy were discussed in detail. Specifically, the risks of infection, scarring, bleeding, prolonged wound healing, incomplete removal, allergy to anesthesia, nerve injury, damage to the spinal accessory nerve, and recurrence were addressed. Prior the procedure, the treatment site was clearly identified and confirmed by the patient. The treatment site was then marked with a sterile surgical marking pen and the patient confirmed that the marked site was correct. The patient voiced agreement that Mohs surgery is the best treatment option for their skin cancer. No guarantees were made or implied to the patient. \\n\\nThe patient had an opportunity to ask questions about their procedure. All questions were answered to the patient's satisfaction. I asked the patient if there were any further questions about the procedure and the patient said \"No.\" All components of Universal Protocol/PAUSE Rule completed. Informed verbal and written consent was obtained. Consent (Near Eyelid Margin)/Introductory Paragraph: The rationale for Mohs was explained to the patient. The risks, benefits, and alternatives to therapy were discussed in detail. Specifically, the risks of infection, scarring, bleeding, prolonged wound healing, incomplete removal, allergy to anesthesia, nerve injury, asymmetry, cosmetic change, loss of function, ectropion or eyelid deformity, and recurrence were addressed. Prior the procedure, the treatment site was clearly identified and confirmed by the patient. The treatment site was then marked with a sterile surgical marking pen and the patient confirmed that the marked site was correct. The patient voiced agreement that Mohs surgery is the best treatment option for their skin cancer. No guarantees were made or implied to the patient. \\n\\nThe patient had an opportunity to ask questions about their procedure. All questions were answered to the patient's satisfaction. I asked the patient if there were any further questions about the procedure and the patient said \"No.\" All components of Universal Protocol/PAUSE Rule completed. Informed verbal and written consent was obtained. Consent (Ear)/Introductory Paragraph: The rationale for Mohs was explained to the patient. The risks, benefits, and alternatives to therapy were discussed in detail. Specifically, the risks of infection, scarring, bleeding, prolonged wound healing, incomplete removal, allergy to anesthesia, nerve injury, asymmetry, cosmetic change, loss of function, ear deformity, and recurrence were addressed. Prior the procedure, the treatment site was clearly identified and confirmed by the patient. The treatment site was then marked with a sterile surgical marking pen and the patient confirmed that the marked site was correct. The patient voiced agreement that Mohs surgery is the best treatment option for their skin cancer. No guarantees were made or implied to the patient. \\n\\nThe patient had an opportunity to ask questions about their procedure. All questions were answered to the patient's satisfaction. I asked the patient if there were any further questions about the procedure and the patient said \"No.\" All components of Universal Protocol/PAUSE Rule completed. Informed verbal and written consent was obtained. Consent (Nose)/Introductory Paragraph: The rationale for Mohs was explained to the patient. The risks, benefits, and alternatives to therapy were discussed in detail. Specifically, the risks of infection, scarring, bleeding, prolonged wound healing, incomplete removal, allergy to anesthesia, nerve injury, asymmetry, loss of function, \\nnasal deformity, changes in the flow of air through the nose, and recurrence were addressed. Prior the procedure, the treatment site was clearly identified and confirmed by the patient. The treatment site was then marked with a sterile surgical marking pen and the patient confirmed that the marked site was correct. The patient voiced agreement that Mohs surgery is the best treatment option for their skin cancer. No guarantees were made or implied to the patient. \\n\\nThe patient had an opportunity to ask questions about their procedure. All questions were answered to the patient's satisfaction. I asked the patient if there were any further questions about the procedure and the patient said \"No.\" All components of Universal Protocol/PAUSE Rule completed. Informed verbal and written consent was obtained. Consent (Lip)/Introductory Paragraph: The rationale for Mohs was explained to the patient. The risks, benefits, and alternatives to therapy were discussed in detail. Specifically, the risks of infection, scarring, bleeding, prolonged wound healing, incomplete removal, allergy to anesthesia, nerve injury, asymmetry, cosmetic change, loss of function, lip deformity, changes in the oral aperture and recurrence were addressed. Prior the procedure, the treatment site was clearly identified and confirmed by the patient. The treatment site was then marked with a sterile surgical marking pen and the patient confirmed that the marked site was correct. The patient voiced agreement that Mohs surgery is the best treatment option for their skin cancer. No guarantees were made or implied to the patient. \\n\\nThe patient had an opportunity to ask questions about their procedure. All questions were answered to the patient's satisfaction. I asked the patient if there were any further questions about the procedure and the patient said \"No.\" All components of Universal Protocol/PAUSE Rule completed. Informed verbal and written consent was obtained. Consent (Scalp)/Introductory Paragraph: The rationale for Mohs was explained to the patient. The risks, benefits, and alternatives to therapy were discussed in detail. Specifically, the risks of infection, scarring, bleeding, prolonged wound healing, incomplete removal, allergy to anesthesia, nerve injury, changes in hair growth secondary to repair, and recurrence were addressed. Prior the procedure, the treatment site was clearly identified and confirmed by the patient. The treatment site was then marked with a sterile surgical marking pen and the patient confirmed that the marked site was correct. The patient voiced agreement that Mohs surgery is the best treatment option for their skin cancer. No guarantees were made or implied to the patient. \\n\\nThe patient had an opportunity to ask questions about their procedure. All questions were answered to the patient's satisfaction. I asked the patient if there were any further questions about the procedure and the patient said \"No.\" All components of Universal Protocol/PAUSE Rule completed. Informed verbal and written consent was obtained. Detail Level: Detailed Postop Diagnosis: same Surgeon: Patrick Santo Jr., MD Anesthesia Volume In Cc: 1.9 Hemostasis: Electrocautery Estimated Blood Loss (Cc): minimal Repair Anesthesia Method: local infiltration Brow Lift Text: A midfrontal incision was made medially to the defect to allow access to the tissues just superior to the left eyebrow. Following careful dissection inferiorly in a supraperiosteal plane to the level of the left eyebrow, several 3-0 monocryl sutures were used to resuspend the eyebrow orbicularis oculi muscular unit to the superior frontal bone periosteum. This resulted in an appropriate reapproximation of static eyebrow symmetry and correction of the left brow ptosis. Deep Sutures: 4-0 Monocryl Epidermal Sutures: 5-0 Prolene Epidermal Closure: running Suturegard Intro: Intraoperative tissue expansion was performed, utilizing the SUTUREGARD device, in order to reduce wound tension. Suturegard Body: The suture ends were repeatedly re-tightened and re-clamped to achieve the desired tissue expansion. Hemigard Intro: Due to skin fragility and wound tension, it was decided to use HEMIGARD adhesive retention suture devices to permit a linear closure. The skin was cleaned and dried for a 6cm distance away from the wound. Excessive hair, if present, was removed to allow for adhesion. Hemigard Postcare Instructions: The HEMIGARD strips are to remain completely dry for at least 5-7 days. Donor Site Anesthesia Type: same as repair anesthesia Graft Basting Suture (Optional): 5-0 Fast Absorbing Gut Graft Donor Site Bandage (Optional-Leave Blank If You Don't Want In Note): The donor site was cleansed with sterile saline and dried. Vaseline and a pressure bandage with telfa were applied. Closure 2 Information: This tab is for additional flaps and grafts, including complex repair and grafts and complex repair and flaps. You can also specify a different location for the additional defect, if the location is the same you do not need to select a new one. We will insert the automated text for the repair you select below just as we do for solitary flaps and grafts. Please note that at this time if you select a location with a different insurance zone you will need to override the ICD10 and CPT if appropriate. Closure 3 Information: This tab is for additional flaps and grafts above and beyond our usual structured repairs.  Please note if you enter information here it will not currently bill and you will need to add the billing information manually. Wound Care: Vaseline Dressing: pressure dressing with telfa Wound Care (No Sutures): Petrolatum Dressing (No Sutures): dry sterile dressing Suture Removal: 7 days Unna Boot Text: An Unna boot was placed to help immobilize the limb and facilitate more rapid healing. Home Suture Removal Text: Patient was provided instructions on removing sutures and will remove their sutures at home.  If they have any questions or difficulties they will call the office. Post-Care Instructions: The patient tolerated the procedure well without any complications. The patient was provided with detailed written post-operative wound care instructions. These instructions were verbally reviewed in detail with the patient in the office prior to discharge. The patient was provided with my cell phone number and asked to please contact me with any questions or concerns. The patient left the office in good medical condition. Pain Refusal Text: I offered to prescribe pain medication but the patient refused to take this medication. Mauc Instructions: By selecting yes to the question below the MAUC number will be added into the note.  This will be calculated automatically based on the diagnosis chosen, the size entered, the body zone selected (H,M,L) and the specific indications you chose. You will also have the option to override the Mohs AUC if you disagree with the automatically calculated number and this option is found in the Case Summary tab. Where Do You Want The Question To Include Opioid Counseling Located?: Case Summary Tab Eye Protection Verbiage: Before proceeding with the stage, a plastic scleral shield was inserted. The globe was anesthetized with a few drops of 1% lidocaine with 1:100,000 epinephrine. Then, an appropriate sized scleral shield was chosen and coated with lacrilube ointment. The shield was gently inserted and left in place for the duration of each stage. After the stage was completed, the shield was gently removed. Mohs Method Verbiage: An incision at a 45 degree angle following the standard Mohs approach was performed and the specimen was harvested as a microscopically controlled layer. Surgeon/Pathologist Verbiage (Will Incorporate Name Of Surgeon From Intro If Not Blank): operated in two distinct and integrated capacities as the surgeon and pathologist. Mohs Histo Method Verbiage: Each section was then chromacoded and processed in the Mohs lab using the Mohs protocol and submitted for en-face frozen sections. Subsequent Stages Histo Method Verbiage: Using a similar technique to that described above, a thin layer of tissue was removed from all areas where tumor was visible on the previous stage. The tissue was again oriented, mapped, dyed, and processed as above. Mohs Rapid Report Verbiage: The area of clinically evident tumor was marked with skin marking ink and appropriately hatched.  The initial incision was made following the Mohs approach through the skin.  The specimen was taken to the lab, divided into the necessary number of pieces, chromacoded and processed according to the Mohs protocol.  This was repeated in successive stages until a tumor free defect was achieved. Complex Repair Preamble Text (Leave Blank If You Do Not Want): Various closure techniques were discussed with the patient and it was determined that a Complex Layered Closure would best preserve normal anatomic and functional relationships. Prior to surgery, I explained that by repairing the wound in a linear or curvilinear fashion, we would follow the concept of removing Burow's triangles of skin at each end of a circular defect to allow the sutured line to lie flat. I used the example that we must convert a circular defect to a \"football shape\" in order to create a sutured curved or line closure free of bumps at each end. I discussed the need to remove these standing cutaneous deformities (triangles of skin) at each end of a circular defect in order to repair the wound in such a fashion as to avoid bunching of the skin at each end. I explained that we need to \"lengthen\" a wound in order to achieve this more desired and appropriate aesthetic result.\\n\\nThe risks, benefits, and alternatives to therapy were discussed in detail. Specifically, the risks of infection, scarring, bleeding,  prolonged wound healing, nerve injury, asymmetry, cosmetic change, wound dehiscence, ecchymosis, swelling, pain, and hematoma were addressed. Using a sterile surgical marker, an appropriate Complex Closure was drawn incorporating the defect and placing the expected incisions within the relaxed skin tension lines where possible. Care was taken in the design of this Complex Closure to have scar camouflage by placing scar lines in the borders of cosmetic units and within dynamic and static rhytids. Care was taken to avoid disruption of the adjacent free margins and to preserve function.\\n\\nThe surgical site was scrubbed with the surgical preparatory solution and draped with a sterile fenestrated drape. Throughout the procedure, the patient was repeatedly instructed to let us know should any pain or discomfort occur. The edges of the wound were debeveled and the wound defect was recreated. The wound was extensively undermined. Meticulous hemostasis was obtained. Burow's triangles were removed to repair standing cone deformities. The advancing wound edges were then meticulously re-approximated and sewed first with deep sutures and then with superficial sutures. Intermediate Repair Preamble Text (Leave Blank If You Do Not Want): Various closure techniques were discussed with the patient and it was determined that an Intermediate Layered Closure would best preserve normal anatomic and functional relationships. Prior to surgery, I explained that by repairing the wound in a linear or curvilinear fashion, we would follow the concept of removing Burow's triangles of skin at each end of a circular defect to allow the sutured line to lie flat. I used the example that we must convert a circular defect to a \"football shape\" in order to create a sutured curved or line closure free of bumps at each end. I discussed the need to remove these standing cutaneous deformities (triangles of skin) at each end of a circular defect in order to repair the wound in such a fashion as to avoid bunching of the skin at each end. I explained that we need to \"lengthen\" a wound in order to achieve this more desired and appropriate aesthetic result.\\n\\nThe risks, benefits, and alternatives to therapy were discussed in detail. Specifically, the risks of infection, scarring, bleeding,  prolonged wound healing, nerve injury, asymmetry, cosmetic change, wound dehiscence, ecchymosis, swelling, pain, and hematoma were addressed. Using a sterile surgical marker, an appropriate Intermediate Layered Closure was drawn incorporating the defect and placing the expected incisions within the relaxed skin tension lines where possible. Care was taken in the design of this Intermediate Layered Closure to have scar camouflage by placing scar lines in the borders of cosmetic units and within dynamic and static rhytids. \\n\\nThe surgical site was scrubbed with the surgical preparatory solution and draped with a sterile fenestrated drape. Throughout the procedure, the patient was repeatedly instructed to let us know should any pain or discomfort occur. The edges of the wound were debeveled and the wound defect was recreated. The wound was extensively undermined. Meticulous hemostasis was obtained. Burow's triangles were removed to repair standing cone deformities. The advancing wound edges were then meticulously re-approximated and sewed first with deep sutures and then with superficial sutures. Crescentic Complex Repair Preamble Text (Leave Blank If You Do Not Want): Extensive wide undermining was performed. Crescentic Intermediate Repair Preamble Text (Leave Blank If You Do Not Want): Undermining was performed with blunt dissection. Non-Graft Cartilage Fenestration Text: The cartilage was fenestrated with a 2mm punch biopsy to help facilitate healing. Graft Cartilage Fenestration Text: The cartilage was fenestrated with a 2mm punch biopsy to help facilitate graft survival and healing. Secondary Intention Text (Leave Blank If You Do Not Want): All other repair options were considered including linear closure, adjacent tissue transfer, and grafting. Because of the size, depth, and location of the defect, it was decided that allowing the wound to heal by secondary intention is the most ideal reconstruction option at this time. No Repair - Repaired With Adjacent Surgical Defect Text (Leave Blank If You Do Not Want): After obtaining clear surgical margins the defect was repaired concurrently with another surgical defect which was in close approximation. Referred To Oculoplastics For Closure Text (Leave Blank If You Do Not Want): After obtaining clear surgical margins the patient was sent to oculoplastics for surgical repair. The patient understands they will receive post-surgical care and follow-up from the referring physician's office. Referred To Otolaryngology For Closure Text (Leave Blank If You Do Not Want): After obtaining clear surgical margins the patient was sent to otolaryngology for surgical repair. The patient understands they will receive post-surgical care and follow-up from the referring physician's office. Referred To Plastics For Closure Text (Leave Blank If You Do Not Want): After obtaining clear surgical margins the patient was sent to plastics for surgical repair. The patient understands they will receive post-surgical care and follow-up from the referring physician's office. Referred To Asc For Closure Text (Leave Blank If You Do Not Want): After obtaining clear surgical margins the patient was sent to an ASC for surgical repair. The patient understands they will receive post-surgical care and follow-up from the ASC physician. Referred To Mid-Level For Closure Text (Leave Blank If You Do Not Want): After obtaining clear surgical margins the patient was sent to a mid-level provider for surgical repair. The patient understands they will receive post-surgical care and follow-up from the mid-level provider. Adjacent Tissue Transfer Text: The defect edges were debeveled with a #15 scalpel blade.  Given the location of the defect and the proximity to free margins an adjacent tissue transfer was deemed most appropriate.  Using a sterile surgical marker, an appropriate flap was drawn incorporating the defect and placing the expected incisions within the relaxed skin tension lines where possible.    The area thus outlined was incised deep to adipose tissue with a #15 scalpel blade.  The skin margins were undermined to an appropriate distance in all directions utilizing iris scissors. Advancement Flap (Single) Text: All other repair options were considered including secondary intention healing, linear closure, and grafting. Because of the size, depth, and location of the defect, it was decided that an adjacent tissue transfer repair is the most ideal reconstruction option at this time. The risks, benefits, and alternatives to therapy were discussed in detail. Specifically, the risks of infection, scarring, bleeding,  prolonged wound healing, nerve injury, asymmetry, cosmetic change, wound dehiscence, ecchymosis, swelling, pain, and hematoma were addressed.\\n\\nA Flap was designed as follows:\\nType of Flap: Advancement Flap, Flap Subtype: Single Advancement Flap\\n\\nUsing a sterile surgical marker, an appropriate Advancement Flap was drawn incorporating the defect and placing the expected incisions within the relaxed skin tension lines where possible. Care was taken in the design of this advancement flap to have scar camouflage by placing scar lines in the borders of cosmetic units and within dynamic and static rhytids. Care was taken to avoid disruption of the adjacent free margins and to preserve function. The surgical site was scrubbed with the surgical preparatory solution and draped with a sterile fenestrated drape. Throughout the procedure, the patient was repeatedly instructed to let us know should any pain or discomfort occur.\\n\\nDue to geometric and functional constraints, a flap reconstruction was performed to reconstruct the defect. To that end, adjacent tissue was incised and carried over to close the defect in the following manner:\\n\\nThe defect edges were debeveled. The skin margins were undermined to an appropriate distance in all directions utilizing iris scissors. The area thus outlined was incised deep to adipose tissue with a #15 scalpel blade and elevated with iris scissors. The adjacent tissue that was incised was then carried over to close the defect. Meticulous hemostasis was achieved. The advancing wound edges were then meticulously re-approximated and sewed first with deep sutures and then with superficial sutures. Advancement Flap (Double) Text: All other repair options were considered including secondary intention healing, linear closure, and grafting. Because of the size, depth, and location of the defect, it was decided that an adjacent tissue transfer repair is the most ideal reconstruction option at this time. The risks, benefits, and alternatives to therapy were discussed in detail. Specifically, the risks of infection, scarring, bleeding,  prolonged wound healing, nerve injury, asymmetry, cosmetic change, wound dehiscence, ecchymosis, swelling, pain, and hematoma were addressed.\\n\\nA Flap was designed as follows:\\nType of Flap: Advancement Flap, Flap Subtype: Double Advancement Flap\\n\\nUsing a sterile surgical marker, an appropriate Advancement Flap was drawn incorporating the defect and placing the expected incisions within the relaxed skin tension lines where possible. Care was taken in the design of this advancement flap to have scar camouflage by placing scar lines in the borders of cosmetic units and within dynamic and static rhytids. Care was taken to avoid disruption of the adjacent free margins and to preserve function. The surgical site was scrubbed with the surgical preparatory solution and draped with a sterile fenestrated drape. Throughout the procedure, the patient was repeatedly instructed to let us know should any pain or discomfort occur.\\n\\nDue to geometric and functional constraints, a flap reconstruction was performed to reconstruct the defect. To that end, adjacent tissue was incised and carried over to close the defect in the following manner:\\n\\nThe defect edges were debeveled. The skin margins were undermined to an appropriate distance in all directions utilizing iris scissors. The area thus outlined was incised deep to adipose tissue with a #15 scalpel blade and elevated with iris scissors. The adjacent tissue that was incised was then carried over to close the defect. Meticulous hemostasis was achieved. The advancing wound edges were then meticulously re-approximated and sewed first with deep sutures and then with superficial sutures. Burow's Advancement Flap Text: All other repair options were considered including secondary intention healing, linear closure, and grafting. Because of the size, depth, and location of the defect, it was decided that an adjacent tissue transfer repair is the most ideal reconstruction option at this time. The risks, benefits, and alternatives to therapy were discussed in detail. Specifically, the risks of infection, scarring, bleeding,  prolonged wound healing, nerve injury, asymmetry, cosmetic change, wound dehiscence, ecchymosis, swelling, pain, and hematoma were addressed.\\n\\nA Flap was designed as follows:\\nType of Flap: Advancement Flap, Flap Subtype: Burow's Advancement Flap\\n\\nUsing a sterile surgical marker, an appropriate Advancement Flap was drawn incorporating the defect and placing the expected incisions within the relaxed skin tension lines where possible. Care was taken in the design of this advancement flap to have scar camouflage by placing scar lines in the borders of cosmetic units and within dynamic and static rhytids. Care was taken to avoid disruption of the adjacent free margins and to preserve function. The surgical site was scrubbed with the surgical preparatory solution and draped with a sterile fenestrated drape. Throughout the procedure, the patient was repeatedly instructed to let us know should any pain or discomfort occur.\\n\\nDue to geometric and functional constraints, a flap reconstruction was performed to reconstruct the defect. To that end, adjacent tissue was incised and carried over to close the defect in the following manner:\\n\\nThe defect edges were debeveled. The skin margins were undermined to an appropriate distance in all directions utilizing iris scissors. The area thus outlined was incised deep to adipose tissue with a #15 scalpel blade and elevated with iris scissors. The adjacent tissue that was incised was then carried over to close the defect. Meticulous hemostasis was achieved. The advancing wound edges were then meticulously re-approximated and sewed first with deep sutures and then with superficial sutures. Chonodrocutaneous Helical Advancement Flap Text: The defect edges were debeveled with a #15 scalpel blade.  Given the location of the defect and the proximity to free margins a chondrocutaneous helical advancement flap was deemed most appropriate.  Using a sterile surgical marker, the appropriate advancement flap was drawn incorporating the defect and placing the expected incisions within the relaxed skin tension lines where possible.    The area thus outlined was incised deep to adipose tissue with a #15 scalpel blade.  The skin margins were undermined to an appropriate distance in all directions utilizing iris scissors. Crescentic Advancement Flap Text: All other repair options were considered including secondary intention healing, linear closure, and grafting. Because of the size, depth, and location of the defect, it was decided that an adjacent tissue transfer repair is the most ideal reconstruction option at this time. The risks, benefits, and alternatives to therapy were discussed in detail. Specifically, the risks of infection, scarring, bleeding,  prolonged wound healing, nerve injury, asymmetry, cosmetic change, wound dehiscence, ecchymosis, swelling, pain, and hematoma were addressed.\\n\\nA Flap was designed as follows:\\nType of Flap: Advancement Flap, Flap Subtype: Crescentic Advancement Flap\\n\\nUsing a sterile surgical marker, an appropriate Advancement Flap was drawn incorporating the defect and placing the expected incisions within the relaxed skin tension lines where possible. Care was taken in the design of this advancement flap to have scar camouflage by placing scar lines in the borders of cosmetic units and within dynamic and static rhytids. Care was taken to avoid disruption of the adjacent free margins and to preserve function. The surgical site was scrubbed with the surgical preparatory solution and draped with a sterile fenestrated drape. Throughout the procedure, the patient was repeatedly instructed to let us know should any pain or discomfort occur.\\n\\nDue to geometric and functional constraints, a flap reconstruction was performed to reconstruct the defect. To that end, adjacent tissue was incised and carried over to close the defect in the following manner:\\n\\nThe defect edges were debeveled. The skin margins were undermined to an appropriate distance in all directions utilizing iris scissors. The area thus outlined was incised deep to adipose tissue with a #15 scalpel blade and elevated with iris scissors. The adjacent tissue that was incised was then carried over to close the defect. Meticulous hemostasis was obtained. The advancing wound edges were then meticulously re-approximated and sewed first with deep sutures and then with superficial sutures. A-T Advancement Flap Text: All other repair options were considered including secondary intention healing, linear closure, and grafting. Because of the size, depth, and location of the defect, it was decided that an adjacent tissue transfer repair is the most ideal reconstruction option at this time. The risks, benefits, and alternatives to therapy were discussed in detail. Specifically, the risks of infection, scarring, bleeding,  prolonged wound healing, nerve injury, asymmetry, cosmetic change, wound dehiscence, ecchymosis, swelling, pain, and hematoma were addressed.\\n\\nA Flap was designed as follows:\\nType of Flap: Advancement Flap, Flap Subtype: A-T Advancement Flap\\n\\nUsing a sterile surgical marker, an appropriate Advancement Flap was drawn incorporating the defect and placing the expected incisions within the relaxed skin tension lines where possible. Care was taken in the design of this advancement flap to have scar camouflage by placing scar lines in the borders of cosmetic units and within dynamic and static rhytids. Care was taken to avoid disruption of the adjacent free margins and to preserve function. The surgical site was scrubbed with the surgical preparatory solution and draped with a sterile fenestrated drape. Throughout the procedure, the patient was repeatedly instructed to let us know should any pain or discomfort occur.\\n\\nDue to geometric and functional constraints, a flap reconstruction was performed to reconstruct the defect. To that end, adjacent tissue was incised and carried over to close the defect in the following manner:\\n\\nThe defect edges were debeveled. The skin margins were undermined to an appropriate distance in all directions utilizing iris scissors. The area thus outlined was incised deep to adipose tissue with a #15 scalpel blade and elevated with iris scissors. The adjacent tissue that was incised was then carried over to close the defect. Meticulous hemostasis was obtained. The advancing wound edges were then meticulously re-approximated and sewed first with deep sutures and then with superficial sutures. O-T Advancement Flap Text: All other repair options were considered including secondary intention healing, linear closure, and grafting. Because of the size, depth, and location of the defect, it was decided that an adjacent tissue transfer repair is the most ideal reconstruction option at this time. The risks, benefits, and alternatives to therapy were discussed in detail. Specifically, the risks of infection, scarring, bleeding,  prolonged wound healing, nerve injury, asymmetry, cosmetic change, wound dehiscence, ecchymosis, swelling, pain, and hematoma were addressed.\\n\\nA Flap was designed as follows:\\nType of Flap: Advancement Flap, Flap Subtype: O-T Advancement Flap\\n\\nUsing a sterile surgical marker, an appropriate Advancement Flap was drawn incorporating the defect and placing the expected incisions within the relaxed skin tension lines where possible.  Care was taken in the design of this advancement flap to have scar camouflage by placing scar lines in the borders of cosmetic units and within dynamic and static rhytids. Care was taken to avoid disruption of the adjacent free margins and to preserve function. The surgical site was scrubbed with the surgical preparatory solution and draped with a sterile fenestrated drape. Throughout the procedure, the patient was repeatedly instructed to let us know should any pain or discomfort occur.\\n\\nDue to geometric and functional constraints, a flap reconstruction was performed to reconstruct the defect. To that end, adjacent tissue was incised and carried over to close the defect in the following manner:\\n\\nThe defect edges were debeveled. The skin margins were undermined to an appropriate distance in all directions utilizing iris scissors. The area thus outlined was incised deep to adipose tissue with a #15 scalpel blade and elevated with iris scissors. The adjacent tissue that was incised was then carried over to close the defect. Meticulous hemostasis was obtained. The advancing wound edges were then meticulously re-approximated and sewed first with deep sutures and then with superficial sutures. O-L Flap Text: All other repair options were considered including secondary intention healing, linear closure, and grafting. Because of the size, depth, and location of the defect, it was decided that an adjacent tissue transfer repair is the most ideal reconstruction option at this time. The risks, benefits, and alternatives to therapy were discussed in detail. Specifically, the risks of infection, scarring, bleeding,  prolonged wound healing, nerve injury, asymmetry, cosmetic change, wound dehiscence, ecchymosis, swelling, pain, and hematoma were addressed.\\n\\nA Flap was designed as follows:\\nType of Flap: Advancement Flap, Flap Subtype: O-L Advancement Flap\\n\\nUsing a sterile surgical marker, an appropriate Advancement Flap was drawn incorporating the defect and placing the expected incisions within the relaxed skin tension lines where possible. Care was taken in the design of this advancement flap to have scar camouflage by placing scar lines in the borders of cosmetic units and within dynamic and static rhytids. Care was taken to avoid disruption of the adjacent free margins and to preserve function. The surgical site was scrubbed with the surgical preparatory solution and draped with a sterile fenestrated drape. Throughout the procedure, the patient was repeatedly instructed to let us know should any pain or discomfort occur.\\n\\nDue to geometric and functional constraints, a flap reconstruction was performed to reconstruct the defect. To that end, adjacent tissue was incised and carried over to close the defect in the following manner:\\n\\nThe defect edges were debeveled. The skin margins were undermined to an appropriate distance in all directions utilizing iris scissors. \\nThe area thus outlined was incised deep to adipose tissue with a #15 scalpel blade and elevated with iris scissors. The adjacent tissue that was incised was then carried over to close the defect. Meticulous hemostasis was obtained. The advancing wound edges were then meticulously re-approximated and sewed first with deep sutures and then with superficial sutures. O-Z Flap Text: All other repair options were considered including secondary intention healing, linear closure, and grafting. Because of the size, depth, and location of the defect, it was decided that an adjacent tissue transfer repair is the most ideal reconstruction option at this time. The risks, benefits, and alternatives to therapy were discussed in detail. Specifically, the risks of infection, scarring, bleeding,  prolonged wound healing, nerve injury, asymmetry, cosmetic change, wound dehiscence, ecchymosis, swelling, pain, and hematoma were addressed.\\n\\nA Flap was designed as follows:\\nType of flap: Rotation Flap, Flap Subtype: O-Z Rotation Flap\\n\\nUsing a sterile surgical marker, an appropriate Rotation Flap was drawn incorporating the defect and placing the expected incisions within the relaxed skin tension lines where possible.  Care was taken in the design of this rotation flap to have scar camouflage by placing scar lines in the borders of cosmetic units and within dynamic and static rhytids. Care was taken to avoid disruption of the adjacent free margins and to preserve function. The surgical site was scrubbed with the surgical preparatory solution and draped with a sterile fenestrated drape. Throughout the procedure, the patient was repeatedly instructed to let us know should any pain or discomfort occur.\\n\\nDue to geometric and functional constraints, a flap reconstruction was performed to reconstruct the defect. To that end, adjacent tissue was incised and carried over to close the defect in the following manner:\\n\\nThe defect edges were debeveled. The skin margins were undermined to an appropriate distance in all directions utilizing iris scissors. The area thus outlined was incised deep to adipose tissue with a #15 scalpel blade and elevated with iris scissors. The adjacent tissue that was incised was then carried over to close the defect. Meticulous hemostasis was obtained. The advancing wound edges were then meticulously re-approximated and sewed first with deep sutures and then with superficial sutures. Double O-Z Flap Text: All other repair options were considered including secondary intention healing, linear closure, and grafting. Because of the size, depth, and location of the defect, it was decided that an adjacent tissue transfer repair is the most ideal reconstruction option at this time. The risks, benefits, and alternatives to therapy were discussed in detail. Specifically, the risks of infection, scarring, bleeding,  prolonged wound healing, nerve injury, asymmetry, cosmetic change, wound dehiscence, ecchymosis, swelling, pain, and hematoma were addressed.\\n\\nA Flap was designed as follows:\\nType of flap: Rotation Flap, Flap Subtype: Double O-Z Rotation Flap\\n\\nUsing a sterile surgical marker, an appropriate Rotation Flap was drawn incorporating the defect and placing the expected incisions within the relaxed skin tension lines where possible. Care was taken in the design of this rotation flap to have scar camouflage by placing scar lines in the borders of cosmetic units and within dynamic and static rhytids. Care was taken to avoid disruption of the adjacent free margins and to preserve function. The surgical site was scrubbed with the surgical preparatory solution and draped with a sterile fenestrated drape. Throughout the procedure, the patient was repeatedly instructed to let us know should any pain or discomfort occur.\\n\\nDue to geometric and functional constraints, a flap reconstruction was performed to reconstruct the defect. To that end, adjacent tissue was incised and carried over to close the defect in the following manner:\\n\\nThe defect edges were debeveled. The skin margins were undermined to an appropriate distance in all directions utilizing iris scissors. The area thus outlined was incised deep to adipose tissue with a #15 scalpel blade and elevated with iris scissors. Meticulous hemostasis was obtained. The adjacent tissue that was incised was then carried over to close the defect. The advancing wound edges were then meticulously re-approximated and sewed first with deep sutures and then with superficial sutures. V-Y Flap Text: All other repair options were considered including secondary intention healing, linear closure, and grafting. Because of the size, depth, and location of the defect, it was decided that an adjacent tissue transfer repair is the most ideal reconstruction option at this time. The risks, benefits, and alternatives to therapy were discussed in detail. Specifically, the risks of infection, scarring, bleeding,  prolonged wound healing, nerve injury, asymmetry, cosmetic change, wound dehiscence, ecchymosis, swelling, pain, and hematoma were addressed.\\n\\nA Flap was designed as follows:\\nType of Flap: Advancement Flap, Flap Subtype: V-Y Advancement Flap\\n\\nUsing a sterile surgical marker, an appropriate Advancement Flap was drawn incorporating the defect and placing the expected incisions within the relaxed skin tension lines where possible. Care was taken in the design of this advancement flap to have scar camouflage by placing scar lines in the borders of cosmetic units and within dynamic and static rhytids. Care was taken to avoid disruption of the adjacent free margins and to preserve function. The surgical site was scrubbed with the surgical preparatory solution and draped with a sterile fenestrated drape. Throughout the procedure, the patient was repeatedly instructed to let us know should any pain or discomfort occur.\\n\\nDue to geometric and functional constraints, a flap reconstruction was performed to reconstruct the defect. To that end, adjacent tissue was incised and carried over to close the defect in the following manner:\\n\\nThe defect edges were debeveled. The skin margins were undermined to an appropriate distance in all directions utilizing iris scissors. The area thus outlined was incised deep to adipose tissue with a #15 scalpel blade and elevated with iris scissors. The skin margins were undermined to an appropriate distance in all directions around the primary defect and laterally outward around the island pedicle utilizing iris scissors. The adjacent tissue that was incised was then carried over to close the defect. There was minimal undermining beneath the pedicle flap. Meticulous hemostasis was obtained. The advancing wound edges were then meticulously re-approximated and sewed first with deep sutures and then with superficial sutures. Advancement-Rotation Flap Text: All other repair options were considered including secondary intention healing, linear closure, and grafting. Because of the size, depth, and location of the defect, it was decided that an adjacent tissue transfer repair is the most ideal reconstruction option at this time. The risks, benefits, and alternatives to therapy were discussed in detail. Specifically, the risks of infection, scarring, bleeding,  prolonged wound healing, nerve injury, asymmetry, cosmetic change, wound dehiscence, ecchymosis, swelling, pain, and hematoma were addressed.\\n\\nA Flap was designed as follows:\\nType of Flap: Advancement Flap, Flap Subtype: Advancement-Rotation Flap\\n\\nUsing a sterile surgical marker, an appropriate Advancement Flap was drawn incorporating the defect and placing the expected incisions within the relaxed skin tension lines where possible. Care was taken in the design of this advancement rotation flap to have scar camouflage by placing scar lines in the borders of cosmetic units and within dynamic and static rhytids. Care was taken to avoid disruption of the adjacent free margins and to preserve function. The surgical site was scrubbed with the surgical preparatory solution and draped with a sterile fenestrated drape. Throughout the procedure, the patient was repeatedly instructed to let us know should any pain or discomfort occur.\\n\\nDue to geometric and functional constraints, a flap reconstruction was performed to reconstruct the defect. To that end, adjacent tissue was incised and carried over to close the defect in the following manner:\\n\\nThe defect edges were debeveled. The skin margins were undermined to an appropriate distance in all directions utilizing iris scissors. The area thus outlined was incised deep to adipose tissue with a #15 scalpel blade and elevated with iris scissors. The adjacent tissue that was incised was then carried over to close the defect. Meticulous hemostasis was obtained. The advancing wound edges were then meticulously re-approximated and sewed first with deep sutures and then with superficial sutures. Mercedes Flap Text: The defect edges were debeveled with a #15 scalpel blade.  Given the location of the defect, shape of the defect and the proximity to free margins a Mercedes flap was deemed most appropriate. Using a sterile surgical marker, an appropriate advancement flap was drawn incorporating the defect and placing the expected incisions within the relaxed skin tension lines where possible. The area thus outlined was incised deep to adipose tissue with a #15 scalpel blade. The skin margins were undermined to an appropriate distance in all directions utilizing iris scissors. Modified Advancement Flap Text: The defect edges were debeveled with a #15 scalpel blade.  Given the location of the defect, shape of the defect and the proximity to free margins a modified advancement flap was deemed most appropriate. Using a sterile surgical marker, an appropriate advancement flap was drawn incorporating the defect and placing the expected incisions within the relaxed skin tension lines where possible. The area thus outlined was incised deep to adipose tissue with a #15 scalpel blade. The skin margins were undermined to an appropriate distance in all directions utilizing iris scissors. Mucosal Advancement Flap Text: All other repair options were considered including secondary intention healing, linear closure, and grafting. Because of the size, depth, and location of the defect, it was decided that an adjacent tissue transfer repair is the most ideal reconstruction option at this time. The risks, benefits, and alternatives to therapy were discussed in detail. Specifically, the risks of infection, scarring, bleeding,  prolonged wound healing, nerve injury, asymmetry, cosmetic change, wound dehiscence, ecchymosis, swelling, pain, and hematoma were addressed.\\n\\nA Flap was designed as follows:\\nType of Flap: Advancement Flap, Flap Subtype: Mucosal Advancement Flap\\n\\nUsing a sterile surgical marker, an appropriate Advancement Flap was drawn incorporating the defect and placing the expected incisions within the relaxed skin tension lines where possible. Care was taken in the design of this advancement flap to have scar camouflage by placing scar lines in the borders of cosmetic units and within dynamic and static rhytids. Care was taken to avoid disruption of the adjacent free margins and to preserve function. The surgical site was scrubbed with the surgical preparatory solution and draped with a sterile fenestrated drape. Throughout the procedure, the patient was repeatedly instructed to let us know should any pain or discomfort occur.\\n\\nDue to geometric and functional constraints, a flap reconstruction was performed to reconstruct the defect. To that end, adjacent tissue was incised and carried over to close the defect in the following manner:\\n\\nThe surgical site was scrubbed with the surgical preparatory solution and draped with a sterile fenestrated drape. Throughout the procedure, the patient was repeatedly instructed to let us know should any pain or discomfort occur. The defect edges were debeveled. The mucosa was undermined anterior to the minor salivary glands and posterior to the orbicularis oris muscle. The adjacent tissue that was incised was then carried over to close the defect. Meticulous hemostasis was obtained. The advancing wound edges were then meticulously re-approximated and sewed first with deep sutures and then with superficial sutures. Peng Advancement Flap Text: The defect edges were debeveled with a #15 scalpel blade.  Given the location of the defect, shape of the defect and the proximity to free margins a Peng advancement flap was deemed most appropriate.  Using a sterile surgical marker, an appropriate advancement flap was drawn incorporating the defect and placing the expected incisions within the relaxed skin tension lines where possible. The area thus outlined was incised deep to adipose tissue with a #15 scalpel blade.  The skin margins were undermined to an appropriate distance in all directions utilizing iris scissors. Hatchet Flap Text: The defect edges were debeveled with a #15 scalpel blade.  Given the location of the defect, shape of the defect and the proximity to free margins a hatchet flap was deemed most appropriate. Using a sterile surgical marker, an appropriate hatchet flap was drawn incorporating the defect and placing the expected incisions within the relaxed skin tension lines where possible. The area thus outlined was incised deep to adipose tissue with a #15 scalpel blade. The skin margins were undermined to an appropriate distance in all directions utilizing iris scissors. Rotation Flap Text: All other repair options were considered including secondary intention healing, linear closure, and grafting. Because of the size, depth, and location of the defect, it was decided that an adjacent tissue transfer repair is the most ideal reconstruction option at this time. The risks, benefits, and alternatives to therapy were discussed in detail. Specifically, the risks of infection, scarring, bleeding,  prolonged wound healing, nerve injury, asymmetry, cosmetic change, wound dehiscence, ecchymosis, swelling, pain, and hematoma were addressed.\\n\\nA Flap was designed as follows: Rotation Flap\\n\\nUsing a sterile surgical marker, an appropriate Rotation Flap was drawn incorporating the defect and placing the expected incisions within the relaxed skin tension lines where possible. Care was taken in the design of this rotation flap to have scar camouflage by placing scar lines in the borders of cosmetic units and within dynamic and static rhytids. Care was taken to avoid disruption of the adjacent free margins and to preserve function. The surgical site was scrubbed with the surgical preparatory solution and draped with a sterile fenestrated drape. Throughout the procedure, the patient was repeatedly instructed to let us know should any pain or discomfort occur.\\n\\nDue to geometric and functional constraints, a flap reconstruction was performed to reconstruct the defect. To that end, adjacent tissue was incised and carried over to close the defect in the following manner:\\n\\nThe defect edges were debeveled. The skin margins were undermined to an appropriate distance in all directions utilizing iris scissors. The area thus outlined was incised deep to adipose tissue with a #15 scalpel blade and elevated with iris scissors. The adjacent tissue that was incised was then carried over to close the defect. Meticulous hemostasis was obtained. The advancing wound edges were then meticulously re-approximated and sewed first with deep sutures and then with superficial sutures. Bilateral Rotation Flap Text: The defect edges were debeveled with a #15 scalpel blade. Given the location of the defect, shape of the defect and the proximity to free margins a bilateral rotation flap was deemed most appropriate. Using a sterile surgical marker, an appropriate rotation flap was drawn incorporating the defect and placing the expected incisions within the relaxed skin tension lines where possible. The area thus outlined was incised deep to adipose tissue with a #15 scalpel blade. The skin margins were undermined to an appropriate distance in all directions utilizing iris scissors. Following this, the designed flap was carried over into the primary defect and sutured into place. Spiral Flap Text: All other repair options were considered including secondary intention healing, linear closure, and grafting. Because of the size, depth, and location of the defect, it was decided that an adjacent tissue transfer repair is the most ideal reconstruction option at this time. The risks, benefits, and alternatives to therapy were discussed in detail. Specifically, the risks of infection, scarring, bleeding,  prolonged wound healing, nerve injury, asymmetry, cosmetic change, wound dehiscence, ecchymosis, swelling, pain, and hematoma were addressed.\\n\\nA Flap was designed as follows:\\nType of flap: Rotation Flap, Flap Subtype: Spiral Flap\\n\\nUsing a sterile surgical marker, an appropriate Rotation Flap was drawn incorporating the defect and placing the expected incisions within the relaxed skin tension lines where possible. Care was taken in the design of this rotation flap to have scar camouflage by placing scar lines in the borders of cosmetic units and within dynamic and static rhytids. Care was taken to avoid disruption of the adjacent free margins and to preserve function. The surgical site was scrubbed with the surgical preparatory solution and draped with a sterile fenestrated drape. Throughout the procedure, the patient was repeatedly instructed to let us know should any pain or discomfort occur.\\n\\nDue to geometric and functional constraints, a flap reconstruction was performed to reconstruct the defect. To that end, adjacent tissue was incised and carried over to close the defect in the following manner:\\n\\nThe defect edges were debeveled. The skin margins were undermined to an appropriate distance in all directions utilizing iris scissors. The area thus outlined was incised deep to adipose tissue with a #15 scalpel blade and elevated with iris scissors. The adjacent tissue that was incised was then carried over to close the defect.\\nMeticulous hemostasis was obtained. The advancing wound edges were then meticulously re-approximated and sewed first with deep sutures and then with superficial sutures. Staged Advancement Flap Text: The defect edges were debeveled with a #15 scalpel blade.  Given the location of the defect, shape of the defect and the proximity to free margins a staged advancement flap was deemed most appropriate.  Using a sterile surgical marker, an appropriate advancement flap was drawn incorporating the defect and placing the expected incisions within the relaxed skin tension lines where possible. The area thus outlined was incised deep to adipose tissue with a #15 scalpel blade.  The skin margins were undermined to an appropriate distance in all directions utilizing iris scissors. Star Wedge Flap Text: The defect edges were debeveled with a #15 scalpel blade.  Given the location of the defect, shape of the defect and the proximity to free margins a star wedge flap was deemed most appropriate. Using a sterile surgical marker, an appropriate rotation flap was drawn incorporating the defect and placing the expected incisions within the relaxed skin tension lines where possible. The area thus outlined was incised deep to adipose tissue with a #15 scalpel blade. The skin margins were undermined to an appropriate distance in all directions utilizing iris scissors. Transposition Flap Text: All other repair options were considered including secondary intention healing, linear closure, and grafting. Because of the size, depth, and location of the defect, it was decided that an adjacent tissue transfer repair is the most ideal reconstruction option at this time. The risks, benefits, and alternatives to therapy were discussed in detail. Specifically, the risks of infection, scarring, bleeding,  prolonged wound healing, nerve injury, asymmetry, cosmetic change, wound dehiscence, ecchymosis, swelling, pain, and hematoma were addressed.\\n\\nA Flap was designed as follows: Transposition Flap\\n\\nUsing a sterile surgical marker, an appropriate Transposition Flap was drawn incorporating the defect and placing the expected incisions within the relaxed skin tension lines where possible. Care was taken in the design of this transposition flap to have scar camouflage by placing scar lines in the borders of cosmetic units and within dynamic and static rhytids. Care was taken to avoid disruption of the adjacent free margins and to preserve function. The surgical site was scrubbed with the surgical preparatory solution and draped with a sterile fenestrated drape. Throughout the procedure, the patient was repeatedly instructed to let us know should any pain or discomfort occur.\\n\\nDue to geometric and functional constraints, a flap reconstruction was performed to reconstruct the defect. To that end, adjacent tissue was incised and carried over to close the defect in the following manner:\\n\\nThe defect edges were debeveled. The skin margins were undermined to an appropriate distance in all directions utilizing iris scissors. The area thus outlined was incised deep to adipose tissue with a #15 scalpel blade and elevated with iris scissors. The adjacent tissue that was incised was then carried over to close the defect. Meticulous hemostasis was obtained. The advancing wound edges were then meticulously re-approximated and sewed first with deep sutures and then with superficial sutures. Muscle Hinge Flap Text: All other repair options were considered including secondary intention healing, linear closure, and grafting. Because of the size, depth, and location of the defect, it was decided that  a muscular hinge flap was needed prior to graft repair to replace volume and support the overlying graft. It was decided that this is most ideal reconstruction option at this time. The risks, benefits, and alternatives to therapy were discussed in detail. Specifically, the risks of infection, scarring, bleeding,  prolonged wound healing, nerve injury, asymmetry, cosmetic change, wound dehiscence, ecchymosis, swelling, pain, and hematoma were addressed.\\n\\nA Flap was designed as follows: Muscle Hinge Flap\\n\\nUsing a sterile surgical marker, an appropriate Muscle Hinge Flap was drawn incorporating the defect and placing the expected incisions within the relaxed skin tension lines where possible. Care was taken in the design of this muscle hinge flap to have scar camouflage by placing scar lines in the borders of cosmetic units and within dynamic and static rhytids. Care was taken to avoid disruption of the adjacent free margins and to preserve function. The surgical site was scrubbed with the surgical preparatory solution and draped with a sterile fenestrated drape. Throughout the procedure, the patient was repeatedly instructed to let us know should any pain or discomfort occur.\\n\\nDue to geometric and functional constraints, a flap reconstruction was performed to reconstruct the defect. To that end, adjacent tissue was incised and carried over to close the defect in the following manner:\\n\\nThe defect edges were debeveled. The skin margins were undermined to an appropriate distance in all directions utilizing iris scissors. The area thus outlined was incised deep to adipose tissue with a #15 scalpel blade and elevated with iris scissors. Meticulous hemostasis was obtained. The adjacent tissue that was incised was then carried over to close the defect. A muscle hinge flap was incised and folded over onto itself like the page of a book and secured with an absorbable suture. The wound was left open in anticipation of the following FTSG. Mustarde Flap Text: The defect edges were debeveled with a #15 scalpel blade.  Given the size, depth and location of the defect and the proximity to free margins a Mustarde flap was deemed most appropriate.  Using a sterile surgical marker, an appropriate flap was drawn incorporating the defect. The area thus outlined was incised with a #15 scalpel blade.  The skin margins were undermined to an appropriate distance in all directions utilizing iris scissors. Nasal Turnover Hinge Flap Text: The defect edges were debeveled with a #15 scalpel blade.  Given the size, depth, location of the defect and the defect being full thickness a nasal turnover hinge flap was deemed most appropriate.  Using a sterile surgical marker, an appropriate hinge flap was drawn incorporating the defect. The area thus outlined was incised with a #15 scalpel blade. The flap was designed to recreate the nasal mucosal lining and the alar rim. The skin margins were undermined to an appropriate distance in all directions utilizing iris scissors. Nasalis-Muscle-Based Myocutaneous Island Pedicle Flap Text: All other repair options were considered including secondary intention healing, linear closure, and grafting. Because of the size, depth, and location of the defect, it was decided that an adjacent tissue transfer repair is the most ideal reconstruction option at this time. The risks, benefits, and alternatives to therapy were discussed in detail. Specifically, the risks of infection, scarring, bleeding,  prolonged wound healing, nerve injury, asymmetry, cosmetic change, wound dehiscence, ecchymosis, swelling, pain, and hematoma were addressed.\\n\\nA Flap was designed as follows:\\nType of Flap: Advancement Flap, Flap Subtype: Nasalis-Muscle-Based Myocutaneous Island Pedicle Flap\\n\\nUsing a sterile surgical marker, an appropriate Nasalis-Muscle-Based Myocutaneous Island Pedicle Flap was drawn incorporating the defect and placing the expected incisions within the relaxed skin tension lines where possible. Care was taken in the design of this advancement flap to have scar camouflage by placing scar lines in the borders of cosmetic units and within dynamic and static rhytids. Care was taken to avoid disruption of the adjacent free margins and to preserve function. The surgical site was scrubbed with the surgical preparatory solution and draped with a sterile fenestrated drape. Throughout the procedure, the patient was repeatedly instructed to let us know should any pain or discomfort occur.\\n\\nDue to geometric and functional constraints, a flap reconstruction was performed to reconstruct the defect. To that end, adjacent tissue was incised and carried over to close the defect in the following manner:\\n\\nThe defect edges were debeveled. The skin margins were undermined to an appropriate distance in all directions utilizing iris scissors. Using a #15 blade, an incision was made around the donor flap to the level of the nasalis muscle. Wide lateral undermining was then performed in both the subcutaneous plane above the nasalis muscle, and in a submuscular plane just above periosteum. This allowed the formation of a free nasalis muscle axial pedicle (based on the angular artery) which was still attached to the actual cutaneous flap, increasing its mobility and vascular viability. The adjacent tissue that was incised was then carried over to close the defect. Meticulous hemostasis was obtained. The flap was mobilized into position and the pivotal anchor points positioned and stabilized with buried interrupted sutures. The advancing wound edges were then meticulously re-approximated and sewed first with deep sutures and then with superficial sutures. Nasalis Myocutaneous Flap Text: Using a #15 blade, an incision was made around the donor flap to the level of the nasalis muscle. Wide lateral undermining was then performed in both the subcutaneous plane above the nasalis muscle, and in a submuscular plane just above periosteum. This allowed the formation of a free nasalis muscle axial pedicle which was still attached to the actual cutaneous flap, increasing its mobility and vascular viability. Hemostasis was obtained with pinpoint electrocoagulation. The flap was mobilized into position and the pivotal anchor points positioned and stabilized with buried interrupted sutures. Subcutaneous and dermal tissues were closed in a multilayered fashion with sutures. Tissue redundancies were excised, and the epidermal edges were apposed without significant tension and sutured with sutures. Nasolabial Transposition Flap Text: The defect edges were debeveled with a #15 scalpel blade.  Given the size, depth and location of the defect and the proximity to free margins a nasolabial transposition flap was deemed most appropriate. Using a sterile surgical marker, an appropriate flap was drawn incorporating the defect. The area thus outlined was incised with a #15 scalpel blade. The skin margins were undermined to an appropriate distance in all directions utilizing iris scissors. Following this, the designed flap was carried into the primary defect and sutured into place. Orbicularis Oris Muscle Flap Text: The defect edges were debeveled with a #15 scalpel blade.  Given that the defect affected the competency of the oral sphincter an obicularis oris muscle flap was deemed most appropriate to restore this competency and normal muscle function.  Using a sterile surgical marker, an appropriate flap was drawn incorporating the defect. The area thus outlined was incised with a #15 scalpel blade. Melolabial Transposition Flap Text: The defect edges were debeveled with a #15 scalpel blade.  Given the location of the defect and the proximity to free margins a melolabial flap was deemed most appropriate. Using a sterile surgical marker, an appropriate melolabial transposition flap was drawn incorporating the defect. The area thus outlined was incised deep to adipose tissue with a #15 scalpel blade. The skin margins were undermined to an appropriate distance in all directions utilizing iris scissors. Rectangular Flap Text: The defect edges were debeveled with a #15 scalpel blade. Given the location of the defect and the proximity to free margins a rectangular flap was deemed most appropriate. Using a sterile surgical marker, an appropriate rectangular flap was drawn incorporating the defect. The area thus outlined was incised deep to adipose tissue with a #15 scalpel blade. The skin margins were undermined to an appropriate distance in all directions utilizing iris scissors. Following this, the designed flap was carried over into the primary defect and sutured into place. Rhombic Flap Text: All other repair options were considered including secondary intention healing, linear closure, and grafting. Because of the size, depth, and location of the defect, it was decided that an adjacent tissue transfer repair is the most ideal reconstruction option at this time. The risks, benefits, and alternatives to therapy were discussed in detail. Specifically, the risks of infection, scarring, bleeding,  prolonged wound healing, nerve injury, asymmetry, cosmetic change, wound dehiscence, ecchymosis, swelling, pain, and hematoma were addressed.\\n\\nA Flap was designed as follows: Rhombic Flap\\n\\nUsing a sterile surgical marker, an appropriate Rhombic Flap was drawn incorporating the defect and placing the expected incisions within the relaxed skin tension lines where possible. Care was taken in the design of this transposition flap to have scar camouflage by placing scar lines in the borders of cosmetic units and within dynamic and static rhytids. Care was taken to avoid disruption of the adjacent free margins and to preserve function. The surgical site was scrubbed with the surgical preparatory solution and draped with a sterile fenestrated drape. Throughout the procedure, the patient was repeatedly instructed to let us know should any pain or discomfort occur.\\n\\nDue to geometric and functional constraints, a flap reconstruction was performed to reconstruct the defect. To that end, adjacent tissue was incised and carried over to close the defect in the following manner:\\n\\nThe defect edges were debeveled. The skin margins were undermined to an appropriate distance in all directions utilizing iris scissors. The area thus outlined was incised deep to adipose tissue with a #15 scalpel blade and elevated with iris scissors. The adjacent tissue that was incised was then carried over to close the defect. Meticulous hemostasis was obtained. The advancing wound edges were then meticulously re-approximated and sewed first with deep sutures and then with superficial sutures. Rhomboid Transposition Flap Text: The defect edges were debeveled with a #15 scalpel blade.  Given the location of the defect and the proximity to free margins a rhomboid transposition flap was deemed most appropriate. Using a sterile surgical marker, an appropriate rhomboid flap was drawn incorporating the defect. The area thus outlined was incised deep to adipose tissue with a #15 scalpel blade. The skin margins were undermined to an appropriate distance in all directions utilizing iris scissors. Bi-Rhombic Flap Text: The defect edges were debeveled with a #15 scalpel blade.  Given the location of the defect and the proximity to free margins a bi-rhombic flap was deemed most appropriate.  Using a sterile surgical marker, an appropriate rhombic flap was drawn incorporating the defect. The area thus outlined was incised deep to adipose tissue with a #15 scalpel blade.  The skin margins were undermined to an appropriate distance in all directions utilizing iris scissors. Helical Rim Advancement Flap Text: All other repair options were considered including secondary intention healing, linear closure, and grafting. Because of the size, depth, and location of the defect, it was decided that an adjacent tissue transfer repair is the most ideal reconstruction option at this time. The risks, benefits, and alternatives to therapy were discussed in detail. Specifically, the risks of infection, scarring, bleeding,  prolonged wound healing, nerve injury, asymmetry, cosmetic change, wound dehiscence, ecchymosis, swelling, pain, and hematoma were addressed.\\n\\nA Flap was designed as follows:\\nType of flap: Advancement Flap, Flap subtype: Helical Rim Advancement Flap\\n\\nUsing a sterile surgical marker, an appropriate Advancement Flap was drawn incorporating the defect and placing the expected incisions between the helical rim and antihelix where possible. Care was taken in the design of this advancement flap to have scar camouflage by placing scar lines in the borders of cosmetic units and within dynamic and static rhytids. Care was taken to avoid disruption of the adjacent free margins and to preserve function. The surgical site was scrubbed with the surgical preparatory solution and draped with a sterile fenestrated drape. Throughout the procedure, the patient was repeatedly instructed to let us know should any pain or discomfort occur.\\n\\nDue to geometric and functional constraints, a flap reconstruction was performed to reconstruct the defect. To that end, adjacent tissue was incised and carried over to close the defect in the following manner:\\n\\nThe defect edges were debeveled. The skin margins were undermined to an appropriate distance in all directions utilizing iris scissors. The area thus outlined was incised deep to adipose tissue with a #15 scalpel blade and elevated with iris scissors. The adjacent tissue that was incised was then carried over to close the defect. Meticulous hemostasis was obtained. The advancing wound edges were then meticulously re-approximated and sewed first with deep sutures and then with superficial sutures. Bilateral Helical Rim Advancement Flap Text: The defect edges were debeveled with a #15 blade scalpel.  Given the location of the defect and the proximity to free margins (helical rim) a bilateral helical rim advancement flap was deemed most appropriate. Using a sterile surgical marker, the appropriate advancement flaps were drawn incorporating the defect and placing the expected incisions between the helical rim and antihelix where possible. The area thus outlined was incised through and through with a #15 scalpel blade. With a skin hook and iris scissors, the flaps were gently and sharply undermined and freed up. Ear Star Wedge Flap Text: All other repair options were considered including secondary intention healing, linear closure, and grafting. Because of the size, depth, and location of the defect, it was decided that an adjacent tissue transfer repair is the most ideal reconstruction option at this time. The risks, benefits, and alternatives to therapy were discussed in detail. Specifically, the risks of infection, scarring, bleeding,  prolonged wound healing, nerve injury, asymmetry, cosmetic change, wound dehiscence, ecchymosis, swelling, pain, and hematoma were addressed.\\n\\nA Flap was designed as follows: \\nType of Flap: Ear Star Wedge Flap\\n\\nThe defect edges were debeveled with a #15 blade scalpel.  Given the location of the defect and the proximity to free margins (helical rim) an ear star wedge flap was deemed most appropriate.  Using a sterile surgical marker, the appropriate flap was drawn incorporating the defect and placing the expected incisions between the helical rim and antihelix where possible. The surgical site was scrubbed with the surgical preparatory solution and draped with a sterile fenestrated drape. Throughout the procedure, the patient was repeatedly instructed to let us know should any pain or discomfort occur.\\n\\nDue to geometric and functional constraints, a flap reconstruction was performed to reconstruct the defect. To that end, adjacent tissue was incised and carried over to close the defect in the following manner:\\n\\nThe area thus outlined was incised through and through with a #15 scalpel blade. The adjacent tissue that was incised was then carried over to close the defect. Meticulous hemostasis was obtained. The advancing wound edges were then meticulously re-approximated and sewed first with deep sutures and then with superficial sutures. Banner Transposition Flap Text: All other repair options were considered including secondary intention healing, linear closure, and grafting. Because of the size, depth, and location of the defect, it was decided that an adjacent tissue transfer repair is the most ideal reconstruction option at this time. The risks, benefits, and alternatives to therapy were discussed in detail. Specifically, the risks of infection, scarring, bleeding,  prolonged wound healing, nerve injury, asymmetry, cosmetic change, wound dehiscence, ecchymosis, swelling, pain, and hematoma were addressed.\\n\\nA Flap was designed as follows: \\nType of Flap: Transposition Flap, Flap Subtype: Banner Transposition Flap\\n\\nUsing a sterile surgical marker, an appropriate Transposition Flap was drawn incorporating the defect and placing the expected incisions within the relaxed skin tension lines where possible. Care was taken in the design of this transposition flap to have scar camouflage by placing scar lines in the borders of cosmetic units and within dynamic and static rhytids. Care was taken to avoid disruption of the adjacent free margins and to preserve function. The surgical site was scrubbed with the surgical preparatory solution and draped with a sterile fenestrated drape. Throughout the procedure, the patient was repeatedly instructed to let us know should any pain or discomfort occur.\\n\\nDue to geometric and functional constraints, a flap reconstruction was performed to reconstruct the defect. To that end, adjacent tissue was incised and carried over to close the defect in the following manner:\\n\\nThe defect edges were debeveled. The skin margins were undermined to an appropriate distance in all directions utilizing iris scissors. The area thus outlined was incised deep to adipose tissue with a #15 scalpel blade and elevated with iris scissors. The adjacent tissue that was incised was then carried over to close the defect. Meticulous hemostasis was obtained. The advancing wound edges were then meticulously re-approximated and sewed first with deep sutures and then with superficial sutures. Bilobed Flap Text: All other repair options were considered including secondary intention healing, linear closure, and grafting. Because of the size, depth, and location of the defect, it was decided that an adjacent tissue transfer repair is the most ideal reconstruction option at this time. The risks, benefits, and alternatives to therapy were discussed in detail. Specifically, the risks of infection, scarring, bleeding,  prolonged wound healing, nerve injury, asymmetry, cosmetic change, wound dehiscence, ecchymosis, swelling, pain, and hematoma were addressed.\\n\\nA Flap was designed as follows: \\nType of Flap: Transposition Flap, Flap Subtype: Bilobed Flap\\n\\nUsing a sterile surgical marker, an appropriate Transposition Flap was drawn incorporating the defect and placing the expected incisions within the relaxed skin tension lines where possible. Care was taken in the design of this transposition flap to have scar camouflage by placing scar lines in the borders of cosmetic units and within dynamic and static rhytids. Care was taken to avoid disruption of the adjacent free margins and to preserve function. The surgical site was scrubbed with the surgical preparatory solution and draped with a sterile fenestrated drape. Throughout the procedure, the patient was repeatedly instructed to let us know should any pain or discomfort occur.\\n\\nDue to geometric and functional constraints, a flap reconstruction was performed to reconstruct the defect. To that end, adjacent tissue was incised and carried over to close the defect in the following manner:\\n\\nThe defect edges were debeveled. The skin margins were undermined to an appropriate distance in all directions utilizing iris scissors. The area thus outlined was incised deep to adipose tissue with a #15 scalpel blade and elevated with iris scissors. The adjacent tissue that was incised was then carried over to close the defect. Meticulous hemostasis was obtained. The advancing wound edges were then meticulously re-approximated and sewed first with deep sutures and then with superficial sutures. Trilobed Flap Text: All other repair options were considered including secondary intention healing, linear closure, and grafting. Because of the size, depth, and location of the defect, it was decided that an adjacent tissue transfer repair is the most ideal reconstruction option at this time. The risks, benefits, and alternatives to therapy were discussed in detail. Specifically, the risks of infection, scarring, bleeding,  prolonged wound healing, nerve injury, asymmetry, cosmetic change, wound dehiscence, ecchymosis, swelling, pain, and hematoma were addressed.\\n\\nA Flap was designed as follows: \\nType of Flap: Transposition Flap, Flap Subtype: Trilobed Flap\\n\\nUsing a sterile surgical marker, an appropriate Transposition Flap was drawn incorporating the defect and placing the expected incisions within the relaxed skin tension lines where possible. Care was taken in the design of this transposition flap to have scar camouflage by placing scar lines in the borders of cosmetic units and within dynamic and static rhytids. Care was taken to avoid disruption of the adjacent free margins and to preserve function. The surgical site was scrubbed with the surgical preparatory solution and draped with a sterile fenestrated drape. Throughout the procedure, the patient was repeatedly instructed to let us know should any pain or discomfort occur.\\n\\nDue to geometric and functional constraints, a flap reconstruction was performed to reconstruct the defect. To that end, adjacent tissue was incised and carried over to close the defect in the following manner:\\n\\nThe defect edges were debeveled. The skin margins were undermined to an appropriate distance in all directions utilizing iris scissors. The area thus outlined was incised deep to adipose tissue with a #15 scalpel blade and elevated with iris scissors. The adjacent tissue that was incised was then carried over to close the defect. Meticulous hemostasis was obtained. The advancing wound edges were then meticulously re-approximated and sewed first with deep sutures and then with superficial sutures. Dorsal Nasal Flap Text: All other repair options were considered including secondary intention healing, linear closure, and grafting. Because of the size, depth, and location of the defect, it was decided that a dorsal nasal rotation flap repair is the most ideal reconstruction option at this time. The risks, benefits, and alternatives to therapy were discussed in detail. Specifically, the risks of infection, scarring, bleeding,  prolonged wound healing, nerve injury, asymmetry, cosmetic change, wound dehiscence, ecchymosis, swelling, pain, and hematoma were addressed.\\n\\nA Flap was designed as follows: \\nType of Flap: Rotation Flap, Flap Subtype: Dorsal Nasal Rotation Flap\\n\\nUsing a sterile surgical marker, an appropriate Dorsal Nasal Rotation Flap was drawn incorporating the defect and placing the expected incisions within the relaxed skin tension lines where possible. Care was taken in the design of this dorsal nasal rotation flap to have scar camouflage by placing scar lines in the borders of cosmetic units and within dynamic and static rhytids. Care was taken to avoid disruption of the adjacent free margins and to preserve function. The surgical site was scrubbed with the surgical preparatory solution and draped with a sterile fenestrated drape. Throughout the procedure, the patient was repeatedly instructed to let us know should any pain or discomfort occur.\\n\\nDue to geometric and functional constraints, a flap reconstruction was performed to reconstruct the defect. To that end, adjacent tissue was incised and carried over to close the defect in the following manner:\\n\\nThe defect edges were debeveled. The skin margins were undermined to an appropriate distance in all directions utilizing iris scissors. The area thus outlined was incised deep to adipose tissue with a #15 scalpel blade and elevated with iris scissors. The adjacent tissue that was incised was then carried over to close the defect. Meticulous hemostasis was obtained. The advancing wound edges were then meticulously re-approximated and sewed first with deep sutures and then with superficial sutures. Island Pedicle Flap Text: The defect edges were debeveled with a #15 scalpel blade.  Given the location of the defect, shape of the defect and the proximity to free margins an island pedicle advancement flap was deemed most appropriate. Using a sterile surgical marker, an appropriate advancement flap was drawn incorporating the defect, outlining the appropriate donor tissue and placing the expected incisions within the relaxed skin tension lines where possible. The area thus outlined was incised deep to adipose tissue with a #15 scalpel blade. The skin margins were undermined to an appropriate distance in all directions around the primary defect and laterally outward around the island pedicle utilizing iris scissors. There was minimal undermining beneath the pedicle flap. Island Pedicle Flap With Canthal Suspension Text: The defect edges were debeveled with a #15 scalpel blade.  Given the location of the defect, shape of the defect and the proximity to free margins an island pedicle advancement flap was deemed most appropriate.  Using a sterile surgical marker, an appropriate advancement flap was drawn incorporating the defect, outlining the appropriate donor tissue and placing the expected incisions within the relaxed skin tension lines where possible. The area thus outlined was incised deep to adipose tissue with a #15 scalpel blade.  The skin margins were undermined to an appropriate distance in all directions around the primary defect and laterally outward around the island pedicle utilizing iris scissors.  There was minimal undermining beneath the pedicle flap. A suspension suture was placed in the canthal tendon to prevent tension and prevent ectropion. Alar Island Pedicle Flap Text: The defect edges were debeveled with a #15 scalpel blade.  Given the location of the defect, shape of the defect and the proximity to the alar rim an island pedicle advancement flap was deemed most appropriate.  Using a sterile surgical marker, an appropriate advancement flap was drawn incorporating the defect, outlining the appropriate donor tissue and placing the expected incisions within the nasal ala running parallel to the alar rim. The area thus outlined was incised with a #15 scalpel blade.  The skin margins were undermined minimally to an appropriate distance in all directions around the primary defect and laterally outward around the island pedicle utilizing iris scissors.  There was minimal undermining beneath the pedicle flap. Double Island Pedicle Flap Text: The defect edges were debeveled with a #15 scalpel blade.  Given the location of the defect, shape of the defect and the proximity to free margins a double island pedicle advancement flap was deemed most appropriate.  Using a sterile surgical marker, an appropriate advancement flap was drawn incorporating the defect, outlining the appropriate donor tissue and placing the expected incisions within the relaxed skin tension lines where possible.    The area thus outlined was incised deep to adipose tissue with a #15 scalpel blade.  The skin margins were undermined to an appropriate distance in all directions around the primary defect and laterally outward around the island pedicle utilizing iris scissors.  There was minimal undermining beneath the pedicle flap. Island Pedicle Flap-Requiring Vessel Identification Text: The defect edges were debeveled with a #15 scalpel blade.  Given the location of the defect, shape of the defect and the proximity to free margins an island pedicle advancement flap was deemed most appropriate.  Using a sterile surgical marker, an appropriate advancement flap was drawn, based on the axial vessel mentioned above, incorporating the defect, outlining the appropriate donor tissue and placing the expected incisions within the relaxed skin tension lines where possible.    The area thus outlined was incised deep to adipose tissue with a #15 scalpel blade.  The skin margins were undermined to an appropriate distance in all directions around the primary defect and laterally outward around the island pedicle utilizing iris scissors.  There was minimal undermining beneath the pedicle flap. Keystone Flap Text: The defect edges were debeveled with a #15 scalpel blade.  Given the location of the defect, shape of the defect a keystone flap was deemed most appropriate.  Using a sterile surgical marker, an appropriate keystone flap was drawn incorporating the defect, outlining the appropriate donor tissue and placing the expected incisions within the relaxed skin tension lines where possible. The area thus outlined was incised deep to adipose tissue with a #15 scalpel blade.  The skin margins were undermined to an appropriate distance in all directions around the primary defect and laterally outward around the flap utilizing iris scissors. O-T Plasty Text: The defect edges were debeveled with a #15 scalpel blade.  Given the location of the defect, shape of the defect and the proximity to free margins an O-T plasty was deemed most appropriate.  Using a sterile surgical marker, an appropriate O-T plasty was drawn incorporating the defect and placing the expected incisions within the relaxed skin tension lines where possible.    The area thus outlined was incised deep to adipose tissue with a #15 scalpel blade.  The skin margins were undermined to an appropriate distance in all directions utilizing iris scissors. O-Z Plasty Text: The defect edges were debeveled with a #15 scalpel blade.  Given the location of the defect, shape of the defect and the proximity to free margins an O-Z plasty (double transposition flap) was deemed most appropriate.  Using a sterile surgical marker, the appropriate transposition flaps were drawn incorporating the defect and placing the expected incisions within the relaxed skin tension lines where possible.    The area thus outlined was incised deep to adipose tissue with a #15 scalpel blade.  The skin margins were undermined to an appropriate distance in all directions utilizing iris scissors.  Hemostasis was achieved with electrocautery.  The flaps were then transposed into place, one clockwise and the other counterclockwise, and anchored with interrupted buried subcutaneous sutures. Double O-Z Plasty Text: The defect edges were debeveled with a #15 scalpel blade.  Given the location of the defect, shape of the defect and the proximity to free margins a Double O-Z plasty (double transposition flap) was deemed most appropriate.  Using a sterile surgical marker, the appropriate transposition flaps were drawn incorporating the defect and placing the expected incisions within the relaxed skin tension lines where possible. The area thus outlined was incised deep to adipose tissue with a #15 scalpel blade.  The skin margins were undermined to an appropriate distance in all directions utilizing iris scissors.  Hemostasis was achieved with electrocautery.  The flaps were then transposed into place, one clockwise and the other counterclockwise, and anchored with interrupted buried subcutaneous sutures. V-Y Plasty Text: The defect edges were debeveled with a #15 scalpel blade.  Given the location of the defect, shape of the defect and the proximity to free margins an V-Y advancement flap was deemed most appropriate.  Using a sterile surgical marker, an appropriate advancement flap was drawn incorporating the defect and placing the expected incisions within the relaxed skin tension lines where possible.    The area thus outlined was incised deep to adipose tissue with a #15 scalpel blade.  The skin margins were undermined to an appropriate distance in all directions utilizing iris scissors. H Plasty Text: Given the location of the defect, shape of the defect and the proximity to free margins a H-plasty was deemed most appropriate for repair.  Using a sterile surgical marker, the appropriate advancement arms of the H-plasty were drawn incorporating the defect and placing the expected incisions within the relaxed skin tension lines where possible. The area thus outlined was incised deep to adipose tissue with a #15 scalpel blade. The skin margins were undermined to an appropriate distance in all directions utilizing iris scissors.  The opposing advancement arms were then advanced into place in opposite direction and anchored with interrupted buried subcutaneous sutures. W Plasty Text: The lesion was extirpated to the level of the fat with a #15 scalpel blade.  Given the location of the defect, shape of the defect and the proximity to free margins a W-plasty was deemed most appropriate for repair.  Using a sterile surgical marker, the appropriate transposition arms of the W-plasty were drawn incorporating the defect and placing the expected incisions within the relaxed skin tension lines where possible.    The area thus outlined was incised deep to adipose tissue with a #15 scalpel blade.  The skin margins were undermined to an appropriate distance in all directions utilizing iris scissors.  The opposing transposition arms were then transposed into place in opposite direction and anchored with interrupted buried subcutaneous sutures. Z Plasty Text: The lesion was extirpated to the level of the fat with a #15 scalpel blade.  Given the location of the defect, shape of the defect and the proximity to free margins a Z-plasty was deemed most appropriate for repair.  Using a sterile surgical marker, the appropriate transposition arms of the Z-plasty were drawn incorporating the defect and placing the expected incisions within the relaxed skin tension lines where possible.    The area thus outlined was incised deep to adipose tissue with a #15 scalpel blade.  The skin margins were undermined to an appropriate distance in all directions utilizing iris scissors.  The opposing transposition arms were then transposed into place in opposite direction and anchored with interrupted buried subcutaneous sutures. Double Z Plasty Text: The lesion was extirpated to the level of the fat with a #15 scalpel blade. Given the location of the defect, shape of the defect and the proximity to free margins a double Z-plasty was deemed most appropriate for repair. Using a sterile surgical marker, the appropriate transposition arms of the double Z-plasty were drawn incorporating the defect and placing the expected incisions within the relaxed skin tension lines where possible. The area thus outlined was incised deep to adipose tissue with a #15 scalpel blade. The skin margins were undermined to an appropriate distance in all directions utilizing iris scissors. The opposing transposition arms were then transposed and carried over into place in opposite direction and anchored with interrupted buried subcutaneous sutures. Zygomaticofacial Flap Text: Given the location of the defect, shape of the defect and the proximity to free margins a zygomaticofacial flap was deemed most appropriate for repair.  Using a sterile surgical marker, the appropriate flap was drawn incorporating the defect and placing the expected incisions within the relaxed skin tension lines where possible. The area thus outlined was incised deep to adipose tissue with a #15 scalpel blade with preservation of a vascular pedicle.  The skin margins were undermined to an appropriate distance in all directions utilizing iris scissors.  The flap was then placed into the defect and anchored with interrupted buried subcutaneous sutures. Cheek Interpolation Flap Text: Due to geometric and functional constraints, a flap reconstruction was performed to reconstruct the defect. To that end, adjacent tissue was incised and carried over to close the defect in the following manner:\\n\\nA decision was made to reconstruct the defect utilizing an interpolation axial flap and a staged reconstruction.  A telfa template was made of the defect.  This telfa template was then used to outline the Cheek Interpolation flap.  The donor area for the pedicle flap was then injected with anesthesia.  The flap was excised through the skin and subcutaneous tissue down to the layer of the underlying musculature.  The interpolation flap was carefully excised within this deep plane to maintain its blood supply.  The edges of the donor site were undermined.   The donor site was closed in a primary fashion.  The pedicle was then rotated into position and sutured.  Once the tube was sutured into place, adequate blood supply was confirmed with blanching and refill.  The pedicle was then wrapped with xeroform gauze and dressed appropriately with a telfa and gauze bandage to ensure continued blood supply and protect the attached pedicle. Cheek-To-Nose Interpolation Flap Text: Due to geometric and functional constraints, a flap reconstruction was performed to reconstruct the defect. To that end, adjacent tissue was incised and carried over to close the defect in the following manner:\\n\\nA decision was made to reconstruct the defect utilizing an interpolation axial flap and a staged reconstruction.  A telfa template was made of the defect.  This telfa template was then used to outline the Cheek-To-Nose Interpolation flap.  The donor area for the pedicle flap was then injected with anesthesia.  The flap was excised through the skin and subcutaneous tissue down to the layer of the underlying musculature.  The interpolation flap was carefully excised within this deep plane to maintain its blood supply.  The edges of the donor site were undermined.   The donor site was closed in a primary fashion.  The pedicle was then rotated into position and sutured.  Once the tube was sutured into place, adequate blood supply was confirmed with blanching and refill.  The pedicle was then wrapped with xeroform gauze and dressed appropriately with a telfa and gauze bandage to ensure continued blood supply and protect the attached pedicle. Interpolation Flap Text: Due to geometric and functional constraints, a flap reconstruction was performed to reconstruct the defect. To that end, adjacent tissue was incised and carried over to close the defect in the following manner:\\n\\nA decision was made to reconstruct the defect utilizing an interpolation axial flap and a staged reconstruction.  A telfa template was made of the defect.  This telfa template was then used to outline the interpolation flap.  The donor area for the pedicle flap was then injected with anesthesia.  The flap was excised through the skin and subcutaneous tissue down to the layer of the underlying musculature.  The interpolation flap was carefully excised within this deep plane to maintain its blood supply.  The edges of the donor site were undermined.   The donor site was closed in a primary fashion.  The pedicle was then rotated into position and sutured.  Once the tube was sutured into place, adequate blood supply was confirmed with blanching and refill.  The pedicle was then wrapped with xeroform gauze and dressed appropriately with a telfa and gauze bandage to ensure continued blood supply and protect the attached pedicle. Melolabial Interpolation Flap Text: Due to geometric and functional constraints, a flap reconstruction was performed to reconstruct the defect. To that end, adjacent tissue was incised and carried over to close the defect in the following manner:\\n\\nA decision was made to reconstruct the defect utilizing an interpolation axial flap and a staged reconstruction.  A telfa template was made of the defect.  This telfa template was then used to outline the melolabial interpolation flap.  The donor area for the pedicle flap was then injected with anesthesia.  The flap was excised through the skin and subcutaneous tissue down to the layer of the underlying musculature.  The pedicle flap was carefully excised within this deep plane to maintain its blood supply.  The edges of the donor site were undermined.   The donor site was closed in a primary fashion.  The pedicle was then rotated into position and sutured.  Once the tube was sutured into place, adequate blood supply was confirmed with blanching and refill.  The pedicle was then wrapped with xeroform gauze and dressed appropriately with a telfa and gauze bandage to ensure continued blood supply and protect the attached pedicle. Mastoid Interpolation Flap Text: A decision was made to reconstruct the defect utilizing an interpolation axial flap and a staged reconstruction.  A telfa template was made of the defect.  This telfa template was then used to outline the mastoid interpolation flap.  The donor area for the pedicle flap was then injected with anesthesia.  The flap was excised through the skin and subcutaneous tissue down to the layer of the underlying musculature.  The pedicle flap was carefully excised within this deep plane to maintain its blood supply.  The edges of the donor site were undermined.   The donor site was closed in a primary fashion.  The pedicle was then rotated into position and sutured.  Once the tube was sutured into place, adequate blood supply was confirmed with blanching and refill.  The pedicle was then wrapped with xeroform gauze and dressed appropriately with a telfa and gauze bandage to ensure continued blood supply and protect the attached pedicle. Posterior Auricular Interpolation Flap Text: Due to geometric and functional constraints, a flap reconstruction was performed to reconstruct the defect. To that end, adjacent tissue was incised and carried over to close the defect in the following manner:\\n\\nA decision was made to reconstruct the defect utilizing an interpolation axial flap and a staged reconstruction.  A telfa template was made of the defect.  This telfa template was then used to outline the posterior auricular interpolation flap.  The donor area for the pedicle flap was then injected with anesthesia.  The flap was excised through the skin and subcutaneous tissue down to the layer of the underlying musculature.  The pedicle flap was carefully excised within this deep plane to maintain its blood supply.  The edges of the donor site were undermined.   The donor site was closed in a primary fashion.  The pedicle was then rotated into position and sutured.  Once the tube was sutured into place, adequate blood supply was confirmed with blanching and refill.  The pedicle was then wrapped with xeroform gauze and dressed appropriately with a telfa and gauze bandage to ensure continued blood supply and protect the attached pedicle. Paramedian Forehead Flap Text: A decision was made to reconstruct the defect utilizing an interpolation axial flap and a staged reconstruction.  A telfa template was made of the defect.  This telfa template was then used to outline the paramedian forehead pedicle flap.  The donor area for the pedicle flap was then injected with anesthesia.  The flap was excised through the skin and subcutaneous tissue down to the layer of the underlying musculature.  The pedicle flap was carefully excised within this deep plane to maintain its blood supply.  The edges of the donor site were undermined.   The donor site was closed in a primary fashion.  The pedicle was then rotated into position and sutured.  Once the tube was sutured into place, adequate blood supply was confirmed with blanching and refill.  The pedicle was then wrapped with xeroform gauze and dressed appropriately with a telfa and gauze bandage to ensure continued blood supply and protect the attached pedicle. Abbe Flap (Upper To Lower Lip) Text: The defect of the lower lip was assessed and measured.  Given the location and size of the defect, an Abbe flap was deemed most appropriate.  Using a sterile surgical marker, an appropriate Abbe flap was measured and drawn on the upper lip. Local anesthesia was then infiltrated.  A scalpel was then used to incise the upper lip through and through the skin, vermilion, muscle and mucosa, leaving the flap pedicled on the opposite side.  The flap was then rotated and transferred to the lower lip defect.  The flap was then sutured into place with a three layer technique, closing the orbicularis oris muscle layer with subcutaneous buried sutures, followed by a mucosal layer and an epidermal layer. Abbe Flap (Lower To Upper Lip) Text: The defect of the upper lip was assessed and measured.  Given the location and size of the defect, an Abbe flap was deemed most appropriate.  Using a sterile surgical marker, an appropriate Abbe flap was measured and drawn on the lower lip. Local anesthesia was then infiltrated. A scalpel was then used to incise the upper lip through and through the skin, vermilion, muscle and mucosa, leaving the flap pedicled on the opposite side.  The flap was then rotated and transferred to the lower lip defect.  The flap was then sutured into place with a three layer technique, closing the orbicularis oris muscle layer with subcutaneous buried sutures, followed by a mucosal layer and an epidermal layer. Estlander Flap (Upper To Lower Lip) Text: The defect of the lower lip was assessed and measured.  Given the location and size of the defect, an Estlander flap was deemed most appropriate.  Using a sterile surgical marker, an appropriate Estlander flap was measured and drawn on the upper lip. Local anesthesia was then infiltrated. A scalpel was then used to incise the lateral aspect of the flap, through skin, muscle and mucosa, leaving the flap pedicled medially.  The flap was then rotated and positioned to fill the lower lip defect.  The flap was then sutured into place with a three layer technique, closing the orbicularis oris muscle layer with subcutaneous buried sutures, followed by a mucosal layer and an epidermal layer. Cheiloplasty (Less Than 50%) Text: A decision was made to reconstruct the defect with a  cheiloplasty.  The defect was undermined extensively.  Additional obicularis oris muscle was excised with a 15 blade scalpel.  The defect was converted into a full thickness wedge, of less than 50% of the vertical height of the lip, to facilite a better cosmetic result.  Small vessels were then tied off with 5-0 monocyrl. The obicularis oris, superficial fascia, adipose and dermis were then reapproximated.  After the deeper layers were approximated the epidermis was reapproximated with particular care given to realign the vermilion border. Cheiloplasty (Complex) Text: A decision was made to reconstruct the defect with a  cheiloplasty.  The defect was undermined extensively.  Additional obicularis oris muscle was excised with a 15 blade scalpel.  The defect was converted into a full thickness wedge to facilite a better cosmetic result.  Small vessels were then tied off with 5-0 monocyrl. The obicularis oris, superficial fascia, adipose and dermis were then reapproximated.  After the deeper layers were approximated the epidermis was reapproximated with particular care given to realign the vermilion border. Ear Wedge Repair Text: A wedge excision was completed by carrying down an excision through the full thickness of the ear and cartilage with an inward facing Burow's triangle. The wound was then closed in a layered fashion. Full Thickness Lip Wedge Repair (Flap) Text: Due to geometric and functional constraints, a flap reconstruction was performed to reconstruct the defect. To that end, adjacent tissue was incised and carried over to close the defect in the following manner:\\n\\nGiven the location of the defect and the proximity to free margins a full thickness wedge repair was deemed most appropriate. Using a sterile surgical marker, the appropriate repair was drawn incorporating the defect and placing the expected incisions perpendicular to the vermilion border. The vermilion border was also meticulously outlined to ensure appropriate reapproximation during the repair. The area thus outlined was incised through and through with a #15 scalpel blade. The muscularis and dermis were reaproximated with deep sutures following hemostasis. Care was taken to realign the vermilion border before proceeding with the superficial closure. Once the vermilion was realigned the superfical and mucosal closure was finished. Ftsg Text: All other repair options were considered including secondary intention healing, linear closure, and adjacent tissue transfer.  Because of the size, depth, and location of the defect, it was decided that a full thickness skin graft was the best reconstruction option at this time. The risks, benefits, and alternatives to therapy were discussed in detail. Specifically, the risks of infection, scarring, bleeding,  prolonged wound healing, nerve injury, asymmetry, cosmetic change, wound dehiscence, ecchymosis, swelling, pain, and hematoma were addressed.\\n\\nThe size of the donor skin to be harvested was carefully measured. Using a sterile surgical marker, the primary defect shape was transferred to the donor site. The surgical site was scrubbed with the surgical preparatory solution and draped with a sterile fenestrated drape. Throughout the procedure, the patient was repeatedly instructed to let us know should any pain or discomfort occur.\\n\\nThe area thus outlined was incised deep to adipose tissue with a #15 scalpel blade. An ellipse of tissue was harvested in the subdermal plane and placed in sterile saline. Wound edges were widely undermined. Meticulous hemostasis was obtained. The secondary defect was then meticulously re-approximated and sewed first with deep sutures and then with superficial sutures.\\n\\nThe harvested graft was then trimmed of adipose tissue until only dermis and epidermis was left. The skin graft was then placed in the primary defect and oriented appropriately. Split-Thickness Skin Graft Text: The defect edges were debeveled with a #15 scalpel blade.  Given the location of the defect, shape of the defect and the proximity to free margins a split thickness skin graft was deemed most appropriate.  Using a sterile surgical marker, the primary defect shape was transferred to the donor site. The split thickness graft was then harvested.  The skin graft was then placed in the primary defect and oriented appropriately. Pinch Graft Text: The defect edges were debeveled with a #15 scalpel blade. Given the location of the defect, shape of the defect and the proximity to free margins a pinch graft was deemed most appropriate. Using a sterile surgical marker, the primary defect shape was transferred to the donor site. The area thus outlined was incised deep to adipose tissue with a #15 scalpel blade.  The harvested graft was then trimmed of adipose tissue until only dermis and epidermis was left. The skin margins of the secondary defect were undermined to an appropriate distance in all directions utilizing iris scissors.  The secondary defect was closed with interrupted buried subcutaneous sutures.  The skin edges were then re-apposed with running  sutures.  The skin graft was then placed in the primary defect and oriented appropriately. Burow's Graft Text: All other repair options were considered including secondary intention healing, linear closure, and adjacent tissue transfer.  Because of the size, depth, and location of the defect, it was decided that a Burow's full thickness skin graft was the best reconstruction option at this time. The risks, benefits, and alternatives to therapy were discussed in detail. Specifically, the risks of infection, scarring, bleeding,  prolonged wound healing, nerve injury, asymmetry, cosmetic change, wound dehiscence, ecchymosis, swelling, pain, and hematoma were addressed.\\n\\nUsing a sterile surgical marker, an appropriate Burow's FTSG was designed incorporating the defect and placing the expected incisions within the relaxed skin tension lines where possible. The size of the donor skin to be harvested was carefully measured and the primary defect shape was transferred to fit within the designed Burow's triangle. \\n\\nThe surgical site was scrubbed with the surgical preparatory solution and draped with a sterile fenestrated drape. Throughout the procedure, the patient was repeatedly instructed to let us know should any pain or discomfort occur.\\n\\nThe defect edges were debeveled with iris scissors. The area thus outlined was incised deep to adipose tissue with a #15 scalpel blade. The standing cone was removed and this tissue was then placed in sterile saline. Wound edges were widely undermined. Meticulous hemostasis was obtained. The secondary defect was then meticulously re-approximated and sewed first with deep sutures and then with superficial sutures.\\n\\nThe harvested graft was then trimmed to fit the size of the primary defect. The graft was trimmed of adipose tissue until only dermis and epidermis was left. The skin graft was then placed in the primary defect and oriented appropriately. Cartilage Graft Text: All other repair options were considered including secondary intention healing, linear closure, and adjacent tissue transfer. Because of the size, depth, and location of the defect, it was decided that a cartilage strut would need to be utilized to provide structural support to the surgical defect. This was the best reconstruction option at this time. The risks, benefits, and alternatives to therapy were discussed in detail. Specifically, the risks of infection, scarring, bleeding,  prolonged wound healing, nerve injury, asymmetry, cosmetic change, wound dehiscence, ecchymosis, swelling, pain, and hematoma were addressed.\\n\\nThe size of the donor cartilage to be harvested was carefully measured and outlined with a sterile surgical marker. The surgical site was scrubbed with the surgical preparatory solution and draped with a sterile fenestrated drape. Throughout the procedure, the patient was repeatedly instructed to let us know should any pain or discomfort occur.\\n\\nAn ellipse of overlying skin was removed and a cartilage strut was harvested. Meticulous hemostasis was obtained. Repair of the donor site was accomplished with accomplished with superficial sutures.\\n\\nThe cartilage graft was then placed within the defect within a pocket created for each end. It was stabilized with several simple interrupted sutures with 5-0 Monocryl. The wound was left open for the next procedure. Composite Graft Text: The defect edges were debeveled with a #15 scalpel blade.  Given the location of the defect, shape of the defect, the proximity to free margins and the fact the defect was full thickness a composite graft was deemed most appropriate.  The defect was outline and then transferred to the donor site.  A full thickness graft was then excised from the donor site. The graft was then placed in the primary defect, oriented appropriately and then sutured into place.  The secondary defect was then repaired using a primary closure. Epidermal Autograft Text: The defect edges were debeveled with a #15 scalpel blade.  Given the location of the defect, shape of the defect and the proximity to free margins an epidermal autograft was deemed most appropriate.  Using a sterile surgical marker, the primary defect shape was transferred to the donor site. The epidermal graft was then harvested.  The skin graft was then placed in the primary defect and oriented appropriately. Dermal Autograft Text: The defect edges were debeveled with a #15 scalpel blade.  Given the location of the defect, shape of the defect and the proximity to free margins a dermal autograft was deemed most appropriate.  Using a sterile surgical marker, the primary defect shape was transferred to the donor site. The area thus outlined was incised deep to adipose tissue with a #15 scalpel blade.  The harvested graft was then trimmed of adipose and epidermal tissue until only dermis was left.  The skin graft was then placed in the primary defect and oriented appropriately. Skin Substitute Text: The defect edges were debeveled with a #15 scalpel blade.  Given the location of the defect, shape of the defect and the proximity to free margins a skin substitute graft was deemed most appropriate.  The graft material was trimmed to fit the size of the defect. The graft was then placed in the primary defect and oriented appropriately. Tissue Cultured Epidermal Autograft Text: The defect edges were debeveled with a #15 scalpel blade.  Given the location of the defect, shape of the defect and the proximity to free margins a tissue cultured epidermal autograft was deemed most appropriate.  The graft was then trimmed to fit the size of the defect.  The graft was then placed in the primary defect and oriented appropriately. Xenograft Text: The defect edges were debeveled with a #15 scalpel blade.  Given the location of the defect, shape of the defect and the proximity to free margins a xenograft was deemed most appropriate.  The graft was then trimmed to fit the size of the defect.  The graft was then placed in the primary defect and oriented appropriately. Purse String (Simple) Text: Given the location of the defect and the characteristics of the surrounding skin a pursestring closure was deemed most appropriate.  Undermining was performed circumfirentially around the surgical defect.  A purstring suture was then placed and tightened. Purse String (Intermediate) Text: Given the location of the defect and the characteristics of the surrounding skin a pursestring intermediate closure was deemed most appropriate.  Undermining was performed circumfirentially around the surgical defect.  A purstring suture was then placed and tightened. Partial Purse String (Simple) Text: Given the location of the defect and the characteristics of the surrounding skin a simple purse string closure was deemed most appropriate.  Undermining was performed circumfirentially around the surgical defect.  A purse string suture was then placed and tightened. Wound tension only allowed a partial closure of the circular defect. Partial Purse String (Intermediate) Text: Given the location of the defect and the characteristics of the surrounding skin an intermediate purse string closure was deemed most appropriate.  Undermining was performed circumfirentially around the surgical defect.  A purse string suture was then placed and tightened. Wound tension only allowed a partial closure of the circular defect. Localized Dermabrasion With Wire Brush Text: The patient was draped in routine manner.  Localized dermabrasion using 3 x 17 mm wire brush was performed in routine manner to papillary dermis. This spot dermabrasion is being performed to complete skin cancer reconstruction. It also will eliminate the other sun damaged precancerous cells that are known to be part of the regional effect of a lifetime's worth of sun exposure. This localized dermabrasion is therapeutic and should not be considered cosmetic in any regard. Localized Dermabrasion With Sand Papertext: The patient was draped in routine manner.  Localized dermabrasion using sterile sand paper was performed in routine manner to papillary dermis. This spot dermabrasion is being performed to complete skin cancer reconstruction. It also will eliminate the other sun damaged precancerous cells that are known to be part of the regional effect of a lifetime's worth of sun exposure. This localized dermabrasion is therapeutic and should not be considered cosmetic in any regard. Localized Dermabrasion With 15 Blade Text: The patient was draped in routine manner.  Localized dermabrasion using a 15 blade was performed in routine manner to papillary dermis. This spot dermabrasion is being performed to complete skin cancer reconstruction. It also will eliminate the other sun damaged precancerous cells that are known to be part of the regional effect of a lifetime's worth of sun exposure. This localized dermabrasion is therapeutic and should not be considered cosmetic in any regard. Tarsorrhaphy Text: A tarsorrhaphy was performed using Frost sutures. Intermediate Repair And Flap Additional Text (Will Appearing After The Standard Complex Repair Text): The intermediate repair was not sufficient to completely close the primary defect. The remaining additional defect was repaired with the flap mentioned below. Intermediate Repair And Graft Additional Text (Will Appearing After The Standard Complex Repair Text): The intermediate repair was not sufficient to completely close the primary defect. The remaining additional defect was repaired with the graft mentioned below. Complex Repair And Flap Additional Text (Will Appearing After The Standard Complex Repair Text): The complex repair was not sufficient to completely close the primary defect. The remaining additional defect was repaired with the flap mentioned below. Complex Repair And Graft Additional Text (Will Appearing After The Standard Complex Repair Text): The complex repair was not sufficient to completely close the primary defect. The remaining additional defect was repaired with the graft mentioned below. Eyelid Full Thickness Repair - 24023: The eyelid defect was full thickness which required a wedge repair of the eyelid. Special care was taken to ensure that the eyelid margin was realligned when placing sutures. Eyelid Partial Thickness Repair - 37431: The eyelid defect was partial thickness which required a wedge repair of the eyelid. Special care was taken to ensure that the eyelid margin was realligned when placing sutures. Manual Repair Warning Statement: We plan on removing the manually selected variable below in favor of our much easier automatic structured text blocks found in the previous tab. We decided to do this to help make the flow better and give you the full power of structured data. Manual selection is never going to be ideal in our platform and I would encourage you to avoid using manual selection from this point on, especially since I will be sunsetting this feature. It is important that you do one of two things with the customized text below. First, you can save all of the text in a word file so you can have it for future reference. Second, transfer the text to the appropriate area in the Library tab. Lastly, if there is a flap or graft type which we do not have you need to let us know right away so I can add it in before the variable is hidden. No need to panic, we plan to give you roughly 6 months to make the change. Same Histology In Subsequent Stages Text: The pattern and morphology of the tumor is as described in the first stage. No Residual Tumor Seen Histology Text: There were no malignant cells seen in the sections examined. Inflammation Suggestive Of Cancer Camouflage Histology Text: There was a dense lymphocytic infiltrate which prevented adequate histologic evaluation of adjacent structures. Incidental Superficial Basal Cell Carcinoma Histology Text: There are nests of atypical basophilic cells present right below or budding off the epidermis with skip areas. Stromal changes and retraction artifacts are noted, consistent with a superficial basal cell carcinoma. Incidental Squamous Cell Carcinoma In Situ Histology Text: There is full-thickness keratinocytic atypia within the epidermis consistent with squamous cell carcinoma in situ. Incidental Actinic Keratosis Histology Text: There is atypia of the keratinocytes in the basal layer of the epidermis, consistent with an actinic keratosis. Bcc Histology Text: Beneath an irregular epidermis, there are small nodular masses of basaloid neoplastic cells with nuclear pleomorphism attached to the basal layer and surrounded by a loose fibrous stroma and mild inflammation in the dermis. The findings are typical for a nodular basal cell carcinoma. Bcc Infiltrative Histology Text: There are nests and cords of atypical basophilic cells present within the fibrotic dermis displaying an infiltrative pattern of invasion. The findings are typical for an infiltrative basal cell carcinoma. Bcc Infundibulocystic Histology Text: Beneath an irregular epidermis, there are small nodular masses of basaloid neoplastic cells with nuclear pleomorphism attached to the basal layer and surrounded by a loose fibrous stroma and mild inflammation in the dermis. There are also multiple small cysts containing cornified material with differentiation towards the infundibulum. The findings are typical for an infundibulocystic basal cell carcinoma. Bcc Keratotic Histology Text: Beneath an irregular epidermis, there are small nodular masses of basaloid neoplastic cells with nuclear pleomorphism attached to the basal layer and surrounded by a loose fibrous stroma and mild inflammation in the dermis. Areas of keratinization within the neoplastic cells are appreciated. The findings are consistent with a keratinizing basal cell carcinoma. Bcc Micronodular Histology Text: There are nests of atypical basophilic neoplastic cells present within the fibrotic dermis displaying a micronodular pattern of invasion. The findings are typical for a micronodular basal cell carcinoma. Bcc  Morpheaform/Sclerosing Histology Text: There are strands and cords of atypical basophilic neoplastic cells embedded in a fibrous network of connective tissue. The appearance is typical for a morpheaform basal cell carcinoma Bcc  Nodular Histology Text: Beneath an irregular epidermis, there are small nodular masses of basaloid neoplastic cells with nuclear pleomorphism attached to the basal layer and surrounded by a loose fibrous stroma and mild inflammation in the dermis. Stromal changes and retraction artifacts are noted. The findings are typical for a nodular basal cell carcinoma. Bcc Superficial Histology Text: There are nests of atypical basophilic cells present right below or budding off the epidermis with skip areas. Stromal changes and retraction artifacts are noted. The findings are consistent with a superficial basal cell carcinoma. Mixed Nodular And Infiltrative Bcc Histology Text: Beneath an irregular epidermis, there are small nodular masses of basaloid neoplastic cells with nuclear pleomorphism attached to the basal layer and surrounded by a loose fibrous stroma and mild inflammation in the dermis. Stromal changes and retraction artifacts are noted. The findings are typical for a nodular basal cell carcinoma. There are also irregular nests of pleomorphic, hyperchromatic basaloid cells with peripheral palisading infiltrate the tissue in a diffuse fashion in association with sclerotic stroma, consistent with an infiltrative basal cell carcinoma. Scc Histology Text: There are islands of atypical squamous cells invading the dermis in addition to full thickness keratinocytic atypia within the epidermis. The findings are consistent with squamous cell carcinoma. Scc Moderately Differentiated Histology Text: There are islands of atypical squamous cells invading the dermis in addition to full thickness keratinocytic atypia within the epidermis. The neoplastic cells demonstrate increased nuclear and cytoplasmic pleomorphism and are moderately differentiated. The findings are consistent with moderately differentiated squamous cell carcinoma. Scc Poorly Differentiated Histology Text: There are strands and nests of pleomorphic cells present in an infiltrative pattern. Mitotic activity is brisk. There is vascular proliferation and acute and chronic inflammation in the dermis. The findings are those of a poorly differentiated squamous cell carcinoma. Scc Acantholytic Histology Text: There are islands of atypical squamous cells invading the dermis in addition to full thickness keratinocytic atypia within the epidermis. Acantholysis of malignant epithelial cells is appreciated. The findings are consistent with an acantholytic squamous cell carcinoma. Scc Ka Subtype Histology Text: There is a cup-shaped exoendophytic epithelial neoplasm characterized by proliferations of keratinocytes with abundant eosinophilic glossy cytoplasm and nuclei with open chromatin in the papillary and upper reticular dermis. Multiple inclusions containing elastic material are seen within the cytoplasm. The features are of squamous cell carcinoma, keratoacanthoma type. Scc Spindle Histology Text: There are sheets of pleomorphic spindled cell areas showing no evidence of epidermal derivation or squamous differentiation. The findings are consistent with squamous cell carcinoma. Melanoma In Situ Histology Text: On a sun-damaged skin, there is a broad and asymmetrical proliferation of enlarged and atypical melanocytes present continuously as single cells and in small irregular coalescing nests along the dermoepidermal junction. The melanocytes extend into the superficial portions of epithelial structures of adnexa. Pagetoid spread of melanocytes is appreciated. Occasional mitotic figures and individually necrotic melanocytes are also noted. In the underlying papillary dermis, there is mild lymphocytic inflammatory infiltrate with prominent dermal fibroplasia and melanophages. Afx Histology Text: There is a poorly differentiated spindled cell neoplasm composed of fascicles of atypical spindled and epithelioid cells, infiltrating and replacing papillary and reticular dermis. Mitotic activity is brisk, including atypical forms. The lesion is present on a sun-damaged skin. This pattern supports the diagnosis of atypical fibrous xanthoma. Mart-1 - Positive Histology Text: MART-1 staining demonstrates areas of higher density and clustering of melanocytes with Pagetoid spread upwards within the epidermis. The surgical margins are positive for tumor cells. Mart-1 - Negative Histology Text: MART-1 staining demonstrates a normal density and pattern of melanocytes along the dermal-epidermal junction. The surgical margins are negative for tumor cells. Information: Selecting Yes will display possible errors in your note based on the variables you have selected. This validation is only offered as a suggestion for you. PLEASE NOTE THAT THE VALIDATION TEXT WILL BE REMOVED WHEN YOU FINALIZE YOUR NOTE. IF YOU WANT TO FAX A PRELIMINARY NOTE YOU WILL NEED TO TOGGLE THIS TO 'NO' IF YOU DO NOT WANT IT IN YOUR FAXED NOTE. Bill 59 Modifier?: No - Continue to Bill 79 Modifier

## 2024-07-17 RX ORDER — LANOLIN ALCOHOL/MO/W.PET/CERES
1 CREAM (GRAM) TOPICAL DAILY
Qty: 90 TABLET | Refills: 1 | Status: SHIPPED | OUTPATIENT
Start: 2024-07-17

## 2024-07-17 RX ORDER — POTASSIUM CHLORIDE 750 MG/1
10 TABLET, FILM COATED, EXTENDED RELEASE ORAL 2 TIMES DAILY
Qty: 90 TABLET | Refills: 1 | Status: SHIPPED | OUTPATIENT
Start: 2024-07-17

## 2024-07-17 RX ORDER — FUROSEMIDE 40 MG/1
20 TABLET ORAL DAILY
Qty: 90 TABLET | Refills: 1 | Status: SHIPPED | OUTPATIENT
Start: 2024-07-17

## 2024-07-17 NOTE — TELEPHONE ENCOUNTER
Rx Refill Note  Requested Prescriptions     Pending Prescriptions Disp Refills    Magnesium Oxide -Mg Supplement 400 (240 Mg) MG tablet 90 tablet 1     Sig: Take 1 tablet by mouth Daily.    furosemide (LASIX) 40 MG tablet 90 tablet 1     Sig: Take 0.5 tablets by mouth Daily. Taking 20mg    potassium chloride 10 MEQ CR tablet 90 tablet 1     Sig: Take 1 tablet by mouth 2 (Two) Times a Day.      Last office visit with prescribing clinician: 5/6/2024   Last telemedicine visit with prescribing clinician: Visit date not found   Next office visit with prescribing clinician: 9/9/2024                         Would you like a call back once the refill request has been completed: [] Yes [] No    If the office needs to give you a call back, can they leave a voicemail: [] Yes [] No    Gordon Monroy  07/17/24, 11:57 EDT

## 2024-07-23 NOTE — TELEPHONE ENCOUNTER
Met patient during surgical consult. Introduced role of nurse navigator and provided contact info. Confirmed apt with Dr Lindsey for next week. Will follow up.    Itzel Mccracken RN, BSN, OCN  GI Nurse Navigator  [facility]  [phone]     Ok to fill - she has been on this medication (not a new med)

## 2024-07-26 ENCOUNTER — TELEPHONE (OUTPATIENT)
Dept: ONCOLOGY | Facility: CLINIC | Age: 89
End: 2024-07-26
Payer: MEDICARE

## 2024-07-26 NOTE — TELEPHONE ENCOUNTER
Caller: Amina Diana    Relationship to patient: Emergency Contact    Best call back number: [phone]    Type of visit: NEW PT    If rescheduling, when is the original appointment: 8/2     Additional notes:PT IS OUT OF TOWN UNTIL 8/3

## 2024-07-29 NOTE — TELEPHONE ENCOUNTER
Called and spoke w/ daughter Megan- per MD appt can be out a couple of months, set up appt for September and Rosa jang   Bactrim Pregnancy And Lactation Text: This medication is Pregnancy Category D and is known to cause fetal risk.  It is also excreted in breast milk.

## 2024-08-07 ENCOUNTER — TELEPHONE (OUTPATIENT)
Dept: GASTROENTEROLOGY | Facility: CLINIC | Age: 89
End: 2024-08-07
Payer: MEDICARE

## 2024-08-07 NOTE — TELEPHONE ENCOUNTER
"Called pt's daughter and she reports her mother reports that the pain is \"all the time\".  She reports that having a bm does not make any difference.  She does deny bleeding. Advised will update Dr English.   "

## 2024-08-07 NOTE — TELEPHONE ENCOUNTER
When are you having pain?  Is it with a BM or all the time?  Does having a BM give you any relief?  Can you pls reach out to the patient to get more information

## 2024-08-07 NOTE — TELEPHONE ENCOUNTER
----- Message from Gricelda HUNG sent at 8/6/2024  7:44 AM EDT -----  Regarding: FW: Rectal pain  Contact: [phone]    ----- Message -----  From: Brittany Villeda  Sent: 8/5/2024   6:22 PM EDT  To: [facility]  Subject: Rectal pain                                      Dr English   I am still having pain in in my rectum.  There is no bleeding and I make good bms, but the pain is horrible.  I've used the lidocaine cream as you have suggested but it doesn't bring me a good relief.   Please can you assist me or come up with an alternative.   Thank you   Brittany Villeda

## 2024-08-08 RX ORDER — MESALAMINE 1000 MG/1
1000 SUPPOSITORY RECTAL NIGHTLY
Qty: 30 SUPPOSITORY | Refills: 0 | Status: SHIPPED | OUTPATIENT
Start: 2024-08-08 | End: 2024-09-19

## 2024-08-08 NOTE — TELEPHONE ENCOUNTER
Rec trial of canasa suppository - pls schedule office visit with me or adriano in next few weeks to assess response.  Call in interim if not improving

## 2024-08-09 ENCOUNTER — TELEPHONE (OUTPATIENT)
Dept: GASTROENTEROLOGY | Facility: CLINIC | Age: 89
End: 2024-08-09

## 2024-08-09 NOTE — TELEPHONE ENCOUNTER
Called pt and left vm for pt to call back.  Also on vm advised pt that we have sent Dr English's response to her thru mychart.

## 2024-08-09 NOTE — PROGRESS NOTES
Date of Office Visit: 2024  Encounter Provider: SHAHID García  Place of Service: [facility]  Patient Name: Brittany Villeda  :1935    Chief complaint  Paroxysmal atrial fibrillation, thoracic aortic aneurysm     History of Present Illness  Patient is a 89 y.o. year old female  patient of Dr. Culp. Past medical history includes hypertension with hypertensive heart disease, obstructive sleep apnea (on BiPAP) obesity, immobility, pulmonary hypertension, history of nonsustained ventricular tachycardia, and obstructive sleep apnea.  She has a history of diastolic dysfunction, nonsustained ventricular tachycardia, atrial fibrillation and bradycardia.  She had a stress perfusion study that was negative in 2019.  She eventually underwent pacemaker placement 2018.  Echo 2021 with ejection fraction 60% with mild RV dysfunction negative saline injection with severe biatrial enlargement and no significant valvular heart disease.  RVSP was 38 mmHg. CT angiogram of the chest was on 2023 when she was in the emergency room for pleuritic chest pain.  Not show pulmonary emboli.  It was a limited evaluation of the aorta.  Previously noted to be 4.1 cm aorta.  The aorta was reviewed with the aortic root measuring 3.6 cm acing aorta measuring 3.7 cm and dilated hepatic veins consistent with right-sided heart failure were noted On 2024 patient was admitted with edema and found to have congestive heart failure treated with IV diuretics and discharged home on Lasix.  It was also suggested she start Jardiance.  Echo at that time showed an ejection fraction 60 to 65% indeterminate diastolic function, mild valve regurgitation and mild to moderate tricuspid regurgitation RVSP 38 mmHg.    Interval history  Patient presents today for routine follow-up.  I will visit with her today and have reviewed her medical record.  Since last visit she denies palpitations,  shortness of breath, edema, dizziness, chest pain or chest pressure, fatigue, syncope or presyncope.  Blood pressure today is well-controlled and she reports similar readings at home and other providers.  She does not routinely exercise but is able to move slowly around her home with no exertional symptoms.    Past Medical History:   Diagnosis Date    Abdominal aortic aneurysm     Anemia     Arrhythmia     Arthritis     Asthma     Bradycardia     CHF (congestive heart failure) 04/2023    Choledocholithiasis 05/09/2021    Colitis     Diastolic dysfunction     Essential hypertension 05/12/2016    GERD (gastroesophageal reflux disease)     Heart block     Hypertension     Hypertensive heart disease     Hyperthyroidism     REPORTS ENLARGED    Inguinal hernia     Kidney stone     Leukopenia     MGUS (monoclonal gammopathy of unknown significance)     Nephrolithiasis     Pacemaker     Paroxysmal atrial fibrillation     Peptic ulceration     Pressure ulcer     REPORTS ON COCCYX. INSTR TO NOTIFY DR BAIRES'S OFFICE    PSVT (paroxysmal supraventricular tachycardia)     Pulmonary hypertension     Rectal bleed     Sleep apnea     NO DEVICE    Subdural hematoma     Systemic hypertension     Trifascicular block     Ulcerative rectosigmoiditis without complication     Ventricular tachycardia     nonsustained     Past Surgical History:   Procedure Laterality Date    BACK SURGERY      lumbar fusion    PAWAN HOLE Left 08/08/2021    Procedure: Left-sided pawan holes for evacuation of subdural hematoma;  Surgeon: Gustavo Callaway MD;  Location: [facility] MAIN OR;  Service: Neurosurgery;  Laterality: Left;    CARDIAC ELECTROPHYSIOLOGY PROCEDURE N/A 11/07/2018    Procedure: Pacemaker DC new   [place];  Surgeon: Jesus Granda MD;  Location: [facility] CATH INVASIVE LOCATION;  Service: Cardiology    CHOLECYSTECTOMY      COLONOSCOPY  06/16/2014    colitis, cryptitis,  tics, NBIH, TA w/low grade dysplasia    COLONOSCOPY N/A 10/28/2019     Procedure: COLONOSCOPY WITH COLD AND HOT POLYPECTOMIES;  Surgeon: Micaela English MD;  Location: [facility] ENDOSCOPY;  Service: Gastroenterology    ENDOSCOPY N/A 05/13/2021    Procedure: ESOPHAGOGASTRODUODENOSCOPY with BX;  Surgeon: Stefan Mcfadden MD;  Location[facility] ENDOSCOPY;  Service: Gastroenterology;  Laterality: N/A;  EPIGASTRIC PAIN  --DUODENAL ULCER, HEMORRHAGIC GASTRITIS, HIATAL HERNIA     ENDOSCOPY N/A 10/11/2022    Procedure: ESOPHAGOGASTRODUODENOSCOPY;  Surgeon: Micaela English MD;  Location: [facility] ENDOS[facility];  Service: Gastroenterology;  Laterality: N/A;  PRE- MELENA  POST- ESOPHAGITIS    HEMORRHOIDECTOMY      HERNIA REPAIR      umbilical    HYSTERECTOMY      INGUINAL HERNIA REPAIR Left 9/1/2023    Procedure: INGUINAL HERNIA REPAIR LEFT;  Surgeon: Antonio Foster MD;  Location:  [facility];  Service: General;  Laterality: Left;    JOINT REPLACEMENT Bilateral     KNEES    MYOMECTOMY      PACEMAKER IMPLANTATION      SHOULDER SURGERY      SIGMOIDOSCOPY N/A 11/02/2022    Procedure: SIGMOIDOSCOPY FLEXIBLE WITH COLD BIOPSIES AND ASPIRATE;  Surgeon: Micaela English MD;  Location: [facility] ENDOSCOPY;  Service: Gastroenterology;  Laterality: N/A;  PRE- RECTAL BLEEDING  POST- HEMORRHOIDS, DIVERTICULOSIS, COLITIS    SINUS SURGERY      TOE NAIL AMPUTATION  03/04/2019    TONSILLECTOMY       Outpatient Medications Prior to Visit   Medication Sig Dispense Refill    acetaminophen (TYLENOL) 500 MG tablet Take 1 tablet by mouth Every 6 (Six) Hours As Needed for Mild Pain .      ELDERBERRY PO Take 2 tablets by mouth Daily.      furosemide (LASIX) 40 MG tablet Take 0.5 tablets by mouth Daily. Taking 20mg 90 tablet 1    lidocaine (XYLOCAINE) 5 % ointment Apply 1 Application topically to the appropriate area as directed Daily. 50 g 1    Magnesium Oxide -Mg Supplement 400 (240 Mg) MG tablet Take 1 tablet by mouth Daily. 90 tablet 1    mesalamine (APRISO) 0.375 g 24 hr capsule TAKE FOUR CAPSULES BY MOUTH DAILY  360 capsule 0    mesalamine (CANASA) 1000 MG suppository Insert 1 suppository into the rectum Every Night for 42 days. 30 suppository 0    mesalamine (CANASA) 1000 MG suppository Insert 1 suppository into the rectum Every Night for 42 days. 30 suppository 0    pantoprazole (PROTONIX) 40 MG EC tablet Take 1 tablet by mouth Daily. 90 tablet 1    potassium chloride 10 MEQ CR tablet Take 1 tablet by mouth 2 (Two) Times a Day. 90 tablet 1    spironolactone (ALDACTONE) 25 MG tablet Take 1 tablet by mouth Daily. 30 tablet 11    tacrolimus (PROTOPIC) 0.1 % ointment Apply 1 Application topically to the appropriate area as directed 2 (Two) Times a Day.      valACYclovir (VALTREX) 1000 MG tablet Take 1 tablet by mouth Daily.      vitamin B-12 (CYANOCOBALAMIN) 1000 MCG tablet Take 1 tablet by mouth Daily. 90 tablet 1     No facility-administered medications prior to visit.       Allergies as of 2024 - Reviewed 2024   Allergen Reaction Noted    Codeine Hallucinations 2017    Amitriptyline Rash 2016    Amoxicillin-pot clavulanate Rash 2016    Aspirin Unknown - Low Severity 2016    Carisoprodol-aspirin-codeine Palpitations 2016    Iodinated contrast media Rash 2016    Latex Rash 2016    Naproxen Rash 2016    Nsaids Unknown - Low Severity 2016    Soma compound with codeine [carisoprodol-aspirin-codeine] Rash 2016    Sulfa antibiotics Rash 2016    Tramadol Palpitations 2019     Social History     Socioeconomic History    Marital status:     Number of children: 10    Years of education: High School   Tobacco Use    Smoking status: Former     Current packs/day: 0.00     Average packs/day: 1.5 packs/day for 12.0 years (18.0 ttl pk-yrs)     Types: Cigarettes     Start date:      Quit date: 1965     Years since quittin.6     Passive exposure: Past    Smokeless tobacco: Never    Tobacco comments:     QUIT SMOKING    Vaping Use     "Vaping status: Never Used   Substance and Sexual Activity    Alcohol use: No     Comment: caffeine - decaf coffee    Drug use: Never    Sexual activity: Defer     Family History   Problem Relation Age of Onset    Diabetes Mother     Breast cancer Sister     Kidney cancer Sister     Heart disease Sister     Prostate cancer Brother     Prostate cancer Brother     Prostate cancer Brother     Malig Hyperthermia Neg Hx      Review of Systems   Constitutional: Negative for malaise/fatigue.   Cardiovascular:  Negative for chest pain, claudication, dyspnea on exertion, leg swelling, near-syncope, orthopnea, palpitations, paroxysmal nocturnal dyspnea and syncope.   Respiratory:  Negative for shortness of breath.    Neurological:  Negative for brief paralysis, dizziness, headaches and light-headedness.   All other systems reviewed and are negative.       Objective:     Vitals:    08/12/24 1303   BP: 115/68   BP Location: Left arm   Patient Position: Sitting   Cuff Size: Adult   SpO2: 98%   Weight: 72.7 kg (160 lb 3.2 oz)   Height: 162.6 cm (64\")     Body mass index is 27.5 kg/m².    Vitals reviewed.   Constitutional:       General: Not in acute distress.     Appearance: Well-developed and not in distress. Frail. Chronically ill-appearing. Not diaphoretic.   HENT:      Head: Normocephalic.   Pulmonary:      Effort: Pulmonary effort is normal. No respiratory distress.      Breath sounds: Normal breath sounds. No wheezing. No rhonchi. No rales.   Cardiovascular:      Normal rate. Irregularly irregular rhythm.      Murmurs: There is no murmur.   Pulses:     Radial: 2+ bilaterally.  Edema:     Peripheral edema absent.   Skin:     General: Skin is warm and dry. There is no cyanosis.      Findings: No rash.   Neurological:      Mental Status: Alert and oriented to person, place, and time.   Psychiatric:         Behavior: Behavior normal. Behavior is cooperative.         Thought Content: Thought content normal.         Judgment: " Judgment normal.     Lab Review:     Lab Results   Component Value Date     07/12/2024     06/12/2024    K 4.0 07/12/2024    K 4.6 06/12/2024     07/12/2024     06/12/2024    CO2 23.8 07/12/2024    CO2 25.2 06/12/2024    BUN 21 07/12/2024    BUN 20 06/12/2024    CREATININE 1.10 (H) 07/12/2024    CREATININE 0.93 06/12/2024    EGFRIFNONA 77 07/29/2021    EGFRIFNONA 48 (L) 06/19/2020    EGFRIFAFRI 115 01/24/2022    EGFRIFAFRI 90 10/25/2021    GLUCOSE 109 (H) 07/12/2024    GLUCOSE 99 06/12/2024    CALCIUM 10.5 07/12/2024    CALCIUM 10.5 06/12/2024    PROTENTOTREF 6.8 05/29/2024    PROTENTOTREF 7.0 04/22/2024    ALBUMIN 3.5 05/29/2024    ALBUMIN 3.7 05/18/2024    BILITOT 0.9 05/18/2024    BILITOT 1.7 (H) 04/26/2024    AST 11 05/18/2024    AST 13 04/26/2024    ALT 9 05/18/2024    ALT 11 04/26/2024     Lab Results   Component Value Date    WBC 3.24 (L) 05/18/2024    WBC 3.46 04/27/2024    HGB 12.6 05/18/2024    HGB 12.9 04/27/2024    HCT 37.8 05/18/2024    HCT 40.0 04/27/2024    .5 (H) 05/18/2024    .4 (H) 04/27/2024     (L) 05/18/2024     (L) 04/27/2024     Lab Results   Component Value Date    PROBNP 535.0 04/25/2024    PROBNP 579.0 04/23/2024     Lab Results   Component Value Date    CKTOTAL 194 (H) 04/13/2021    TROPONINT 23 (H) 05/18/2024     Lab Results   Component Value Date    TSH 0.817 04/25/2024    TSH 0.854 04/25/2022             ECG 12 Lead    Date/Time: 8/12/2024 1:25 PM  Performed by: Sydney Gilliam APRN    Authorized by: Sydney Gilliam APRN  Comparison: compared with previous ECG   Similar to previous ECG  Rhythm: atrial fibrillation and paced  Rate: normal  BPM: 67  Conduction: right bundle branch block  QRS axis: normal  Comments: Similar to prior      Assessment:       Diagnosis Plan   1. Paroxysmal atrial fibrillation        2. Pulmonary hypertension        3. Chronic heart failure with preserved ejection fraction        4. Essential  hypertension        5. Pacemaker        6. Aneurysm of ascending aorta without rupture        7. MGUS (monoclonal gammopathy of unknown significance)        8. HUGO (obstructive sleep apnea)        9. Presence of cardiac pacemaker        10. AV block, complete          Plan:       1.  Persistent atrial fibrillation with reasonable rate control.  Previously felt to be poor candidate for anticoagulation due to history of GI bleed and decreased mobility.   2.  Diastolic CHF.  Now following with Dr. Win and on Aldactone and furosemide.  She seems to be tolerating this well  3.  Status post permanent pacemaker placement 2018.  Most recent device check with normal device function and good battery life.  4.  History of hyperkalemia.  Stable on current regimen  5.  Dilated asending aorta.  Ascending aorta measured 3.7 cm by CT 4/2023.  6.  History of RV dysfunction and pulmonary hypertension now with.  Stable on most recent echocardiogram in April 2024  7.  Untreated HUGO, untreated.  Unfortunately this remains untreated  8.  Rectal bleeding with hemorroids.  No recent events  9.  Mediastinal adenopathy.  Followed by Dr. Esparza  10.  Debilitated state.  Slowly ambulating around her home.  11.  Thyromegaly with tracheal mass effect.  Additional recommendations per Dr. Esparza      Time Spent: I spent 30 minutes caring for Brittany on this date of service. This time includes time spent by me in the following activities: preparing for the visit, reviewing tests, performing a medically appropriate examination and/or evaluations, counseling and educating the patient/family/caregiver, ordering medications, tests, or procedures, documenting information in the medical record, independently interpreting results and communicating that information with the patient/family/caregiver, and Obtaining an outside history.   I spent 1 minutes on the separately reported service of ECG. This time is not included in the time used to support the  E/M service also reported today.        Your medication list            Accurate as of August 12, 2024  1:25 PM. If you have any questions, ask your nurse or doctor.                CONTINUE taking these medications        Instructions Last Dose Given Next Dose Due   acetaminophen 500 MG tablet  Commonly known as: TYLENOL      Take 1 tablet by mouth Every 6 (Six) Hours As Needed for Mild Pain .       ELDERBERRY PO      Take 2 tablets by mouth Daily.       furosemide 40 MG tablet  Commonly known as: LASIX      Take 0.5 tablets by mouth Daily. Taking 20mg       lidocaine 5 % ointment  Commonly known as: XYLOCAINE      Apply 1 Application topically to the appropriate area as directed Daily.       Magnesium Oxide -Mg Supplement 400 (240 Mg) MG tablet      Take 1 tablet by mouth Daily.       mesalamine 0.375 g 24 hr capsule  Commonly known as: APRISO      TAKE FOUR CAPSULES BY MOUTH DAILY       mesalamine 1000 MG suppository  Commonly known as: CANASA      Insert 1 suppository into the rectum Every Night for 42 days.       mesalamine 1000 MG suppository  Commonly known as: CANASA      Insert 1 suppository into the rectum Every Night for 42 days.       pantoprazole 40 MG EC tablet  Commonly known as: PROTONIX      Take 1 tablet by mouth Daily.       potassium chloride 10 MEQ CR tablet      Take 1 tablet by mouth 2 (Two) Times a Day.       spironolactone 25 MG tablet  Commonly known as: ALDACTONE      Take 1 tablet by mouth Daily.       tacrolimus 0.1 % ointment  Commonly known as: PROTOPIC      Apply 1 Application topically to the appropriate area as directed 2 (Two) Times a Day.       valACYclovir 1000 MG tablet  Commonly known as: VALTREX      Take 1 tablet by mouth Daily.       vitamin B-12 1000 MCG tablet  Commonly known as: CYANOCOBALAMIN      Take 1 tablet by mouth Daily.                Patient is no longer taking -.  I corrected the med list to reflect this.  I did not stop these medications.    No follow-ups on  file.      Dictated utilizing Dragon dictation

## 2024-08-09 NOTE — TELEPHONE ENCOUNTER
Hub staff attempted to follow warm transfer process and was unsuccessful     Caller: Amina Diana    Relationship to patient: Emergency Contact    Best call back number: [phone]     Patient is needing: PATIENTS' DAUGHTER CALLED RETURNING SHARONS' CALL.

## 2024-08-09 NOTE — TELEPHONE ENCOUNTER
Called pt 's daughter and advised of Dr English's note. Verb understanding.     F/u appt made for 09/03 at 830a with Ana Cristina MONSALVE.

## 2024-08-12 ENCOUNTER — OFFICE VISIT (OUTPATIENT)
Dept: CARDIOLOGY | Facility: CLINIC | Age: 89
End: 2024-08-12
Payer: MEDICARE

## 2024-08-12 VITALS
SYSTOLIC BLOOD PRESSURE: 115 MMHG | BODY MASS INDEX: 27.35 KG/M2 | WEIGHT: 160.2 LBS | HEIGHT: 64 IN | DIASTOLIC BLOOD PRESSURE: 68 MMHG | OXYGEN SATURATION: 98 %

## 2024-08-12 DIAGNOSIS — I27.20 PULMONARY HYPERTENSION: ICD-10-CM

## 2024-08-12 DIAGNOSIS — D47.2 MGUS (MONOCLONAL GAMMOPATHY OF UNKNOWN SIGNIFICANCE): ICD-10-CM

## 2024-08-12 DIAGNOSIS — I50.32 CHRONIC HEART FAILURE WITH PRESERVED EJECTION FRACTION: ICD-10-CM

## 2024-08-12 DIAGNOSIS — I71.21 ANEURYSM OF ASCENDING AORTA WITHOUT RUPTURE: ICD-10-CM

## 2024-08-12 DIAGNOSIS — Z95.0 PRESENCE OF CARDIAC PACEMAKER: ICD-10-CM

## 2024-08-12 DIAGNOSIS — I48.0 PAROXYSMAL ATRIAL FIBRILLATION: Primary | ICD-10-CM

## 2024-08-12 DIAGNOSIS — G47.33 OSA (OBSTRUCTIVE SLEEP APNEA): ICD-10-CM

## 2024-08-12 DIAGNOSIS — Z95.0 PACEMAKER: ICD-10-CM

## 2024-08-12 DIAGNOSIS — I10 ESSENTIAL HYPERTENSION: ICD-10-CM

## 2024-08-12 DIAGNOSIS — I44.2 AV BLOCK, COMPLETE: ICD-10-CM

## 2024-08-12 PROCEDURE — 99214 OFFICE O/P EST MOD 30 MIN: CPT | Performed by: NURSE PRACTITIONER

## 2024-08-12 PROCEDURE — 93000 ELECTROCARDIOGRAM COMPLETE: CPT | Performed by: NURSE PRACTITIONER

## 2024-08-28 ENCOUNTER — LAB (OUTPATIENT)
Dept: LAB | Facility: HOSPITAL | Age: 89
End: 2024-08-28
Payer: MEDICARE

## 2024-08-28 ENCOUNTER — HOSPITAL ENCOUNTER (OUTPATIENT)
Dept: CARDIOLOGY | Facility: HOSPITAL | Age: 89
Discharge: HOME | End: 2024-08-28
Payer: MEDICARE

## 2024-08-28 VITALS
HEIGHT: 64 IN | WEIGHT: 159 LBS | SYSTOLIC BLOOD PRESSURE: 111 MMHG | OXYGEN SATURATION: 99 % | HEART RATE: 64 BPM | DIASTOLIC BLOOD PRESSURE: 64 MMHG | BODY MASS INDEX: 27.14 KG/M2

## 2024-08-28 DIAGNOSIS — I50.32 CHRONIC HEART FAILURE WITH PRESERVED EJECTION FRACTION: ICD-10-CM

## 2024-08-28 DIAGNOSIS — I51.9 DYSFUNCTION OF RIGHT CARDIAC VENTRICLE: ICD-10-CM

## 2024-08-28 DIAGNOSIS — I27.20 PULMONARY HYPERTENSION: Primary | ICD-10-CM

## 2024-08-28 LAB
ANION GAP SERPL CALCULATED.3IONS-SCNC: 11 MMOL/L (ref 5–15)
BUN SERPL-MCNC: 25 MG/DL (ref 8–23)
BUN/CREAT SERPL: 18.9 (ref 7–25)
CALCIUM SPEC-SCNC: 10.7 MG/DL (ref 8.6–10.5)
CHLORIDE SERPL-SCNC: 102 MMOL/L (ref 98–107)
CO2 SERPL-SCNC: 23 MMOL/L (ref 22–29)
CREAT SERPL-MCNC: 1.32 MG/DL (ref 0.57–1)
EGFRCR SERPLBLD CKD-EPI 2021: 38.7 ML/MIN/1.73
GLUCOSE SERPL-MCNC: 148 MG/DL (ref 65–99)
POTASSIUM SERPL-SCNC: 5.3 MMOL/L (ref 3.5–5.2)
SODIUM SERPL-SCNC: 136 MMOL/L (ref 136–145)

## 2024-08-28 PROCEDURE — G0463 HOSPITAL OUTPT CLINIC VISIT: HCPCS

## 2024-08-28 PROCEDURE — 80048 BASIC METABOLIC PNL TOTAL CA: CPT

## 2024-08-28 PROCEDURE — 36415 COLL VENOUS BLD VENIPUNCTURE: CPT

## 2024-08-28 PROCEDURE — 94726 PLETHYSMOGRAPHY LUNG VOLUMES: CPT | Performed by: NURSE PRACTITIONER

## 2024-08-28 RX ORDER — SPIRONOLACTONE 25 MG/1
12.5 TABLET ORAL DAILY
Start: 2024-08-28

## 2024-08-28 NOTE — LETTER
August 28, 2024       No Recipients    Patient: Brittany Villeda   YOB: 1935   Date of Visit: 8/28/2024     Dear Mary Grace Culp MD:       Thank you for referring Brittany Villeda to me for evaluation. Below are the relevant portions of my assessment and plan of care.    If you have questions, please do not hesitate to call me. I look forward to following Brittany along with you.         Sincerely,        SHAHID Bazan        CC:   No Recipients    Brooke Clay APRN  08/28/24 1223  Signed    [facility]    Mary Grace Culp MD  [address] [id number]    Thank you for asking me to see Brittany Villeda.     History of Present Illness    1.  Pulmonary hypertension  2.  HFpEF    Subjective     Brittany Damian a 89 y.o. female who presents today for pulmonary hypertension, HFpEF.   The patient's most recent 2D TTE showed evidence of pulmonary hypertension and right-sided involvement.  She does have biatrial dilatation.  She was diagnosed with HUGO. She does not use a CPAP. She was diagnosed with complete heart block in the past and required placement of a PPM.  She also was diagnosed with COPD.  She does not routinely follow with pulmonary medicine.  She also was diagnosed with MGUS.    She does have follow-up scheduled now with heme-onc in August.  The patient continues to report exercise intolerance.  Reports chronic bilateral lower extremity edema that is unchanged.  Denies any abdominal distention.    She does report exertional dyspnea/shortness of breath.   Denies any PND.     Review of Systems - Review of Systems   Constitutional: Negative for weight gain and weight loss.   Cardiovascular:  Negative for chest pain, dyspnea on exertion, leg swelling, orthopnea, paroxysmal nocturnal dyspnea and syncope.   Respiratory:  Negative for cough, shortness of breath, sleep disturbances due to breathing and snoring.     Gastrointestinal:  Negative for bloating.   Neurological:  Negative for dizziness, light-headedness and weakness.          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-sided sciatica   • Essential hypertension   • Gastroesophageal reflux disease without esophagitis   • Cataract   • Pulmonary hypertension   • Diastolic dysfunction   • HUGO (obstructive sleep apnea)   • Trifascicular block   • MGUS (monoclonal gammopathy of unknown significance)   • Ulcerative rectosigmoiditis without complication   • Lichen sclerosus   • Heart block   • Sleep-related hypoxia   • Bradycardia   • History of atrial fibrillation   • Pacemaker   • Paroxysmal SVT (supraventricular tachycardia)   • History of chest pain   • Palpitations   • Paroxysmal atrial fibrillation   • Ascending aortic aneurysm   • Mediastinal lymphadenopathy   • Anemia   • Obesity (BMI 30-39.9)   • Generalized weakness   • Dysautonomia   • Hypercalcemia   • Hyperparathyroidism   • Choledocholithiasis   • Duodenal ulcer   • Hemorrhagic gastritis   • Exertional dyspnea   • COPD (chronic obstructive pulmonary disease)   • Osteoarthritis of glenohumeral joint   • ICH (intracerebral hemorrhage)   • Lower GI bleed   • Thrombocytopenia   • Lichen sclerosus of female genitalia   • Senile atrophic vaginitis   • Left inguinal hernia   • Vulvar pain   • Bilateral lower extremity edema   • Chronic heart failure with preserved ejection fraction   • Acute renal insufficiency   • Dysfunction of right cardiac ventricle     family history includes Breast cancer in her sister; Diabetes in her mother; Heart disease in her sister; Kidney cancer in her sister; Prostate cancer in her brother, brother, and brother.   reports that she quit smoking about 59 years ago. Her smoking use included cigarettes. She started smoking about 71 years ago. She has a 18 pack-year smoking history. She has been exposed to tobacco  smoke. She has never used smokeless tobacco. She reports that she does not drink alcohol and does not use drugs.  Allergies   Allergen Reactions   • Codeine Hallucinations     Tolerates hydromorphone   • Amitriptyline Rash   • Amoxicillin-Pot Clavulanate Rash   • Aspirin Unknown - Low Severity     Patient doesn't know why   • Carisoprodol-Aspirin-Codeine Palpitations   • Iodinated Contrast Media Rash   • Latex Rash   • Naproxen Rash   • Nsaids Unknown - Low Severity     UNSURE OF REACTION   • Soma Compound With Codeine [Carisoprodol-Aspirin-Codeine] Rash   • Sulfa Antibiotics Rash   • Tramadol Palpitations     heart races      Physical Activity: Sufficiently Active (4/26/2024)    Exercise Vital Sign    • Days of Exercise per Week: 5 days    • Minutes of Exercise per Session: 30 min          Current Outpatient Medications:   •  acetaminophen (TYLENOL) 500 MG tablet, Take 1 tablet by mouth Every 6 (Six) Hours As Needed for Mild Pain ., Disp: , Rfl:   •  ELDERBERRY PO, Take 2 tablets by mouth Daily., Disp: , Rfl:   •  furosemide (LASIX) 40 MG tablet, Take 0.5 tablets by mouth Daily. Taking 20mg, Disp: 90 tablet, Rfl: 1  •  lidocaine (XYLOCAINE) 5 % ointment, Apply 1 Application topically to the appropriate area as directed Daily., Disp: 50 g, Rfl: 1  •  Magnesium Oxide -Mg Supplement 400 (240 Mg) MG tablet, Take 1 tablet by mouth Daily., Disp: 90 tablet, Rfl: 1  •  mesalamine (APRISO) 0.375 g 24 hr capsule, TAKE FOUR CAPSULES BY MOUTH DAILY, Disp: 360 capsule, Rfl: 0  •  mesalamine (CANASA) 1000 MG suppository, Insert 1 suppository into the rectum Every Night for 42 days., Disp: 30 suppository, Rfl: 0  •  pantoprazole (PROTONIX) 40 MG EC tablet, Take 1 tablet by mouth Daily., Disp: 90 tablet, Rfl: 1  •  spironolactone (ALDACTONE) 25 MG tablet, Take 0.5 tablets by mouth Daily., Disp: , Rfl:   •  tacrolimus (PROTOPIC) 0.1 % ointment, Apply 1 Application topically to the appropriate area as directed 2 (Two) Times a Day.,  Disp: , Rfl:   •  valACYclovir (VALTREX) 1000 MG tablet, Take 1 tablet by mouth Daily., Disp: , Rfl:   •  vitamin B-12 (CYANOCOBALAMIN) 1000 MCG tablet, Take 1 tablet by mouth Daily., Disp: 90 tablet, Rfl: 1    Objective  Vital Sign Review:   Vitals:    08/28/24 1044   BP: 111/64   Pulse: 64   SpO2: 99%     Body mass index is 27.28 kg/m².      08/28/24  1044   Weight: 72.1 kg (159 lb)     Physical Exam:  Constitutional:       Appearance: Normal and healthy appearance.   Neck:      Vascular: No carotid bruit or JVD. JVD normal.   Pulmonary:      Effort: Pulmonary effort is normal.      Breath sounds: Normal breath sounds.   Cardiovascular:      PMI at left midclavicular line. Normal rate. Regular rhythm.   Pulses:     Intact distal pulses.   Edema:     Peripheral edema absent.   Abdominal:      Palpations: Abdomen is soft.      Tenderness: There is no abdominal tenderness.   Skin:     General: Skin is warm and dry.   Neurological:      General: No focal deficit present.      Mental Status: Alert and oriented to person, place and time.   Psychiatric:         Behavior: Behavior is cooperative.          DATA REVIEWED:   EKG:  ECG 12 Lead     Date/Time: 8/12/2024 1:25 PM  Performed by: Sydney Gilliam APRN     Authorized by: Sydney Gilliam APRN  Comparison: compared with previous ECG   Similar to previous ECG  Rhythm: atrial fibrillation and paced  Rate: normal  BPM: 67  Conduction: right bundle branch block  QRS axis: normal  Comments: Similar to prior    ---------------------------------------------------  ECHO:    Results for orders placed during the hospital encounter of 04/23/24    Adult Transthoracic Echo Complete W/ Cont if Necessary Per Protocol    Interpretation Summary  •  Left ventricular systolic function is normal. Left ventricular ejection fraction appears to be 61 - 65%.  •  Left ventricular diastolic function was indeterminate.  •  The right ventricular cavity is mildly dilated. Normal right  ventricular systolic function noted.  •  The left atrial cavity is severely dilated.  •  The right atrial cavity is severely dilated.  •  Saline test results are negative.  •  Mild aortic valve regurgitation is present.  •  Mild mitral valve regurgitation is present.  •  Mild to moderate tricuspid valve regurgitation is present.  •  Calculated right ventricular systolic pressure from tricuspid regurgitation is 38 mmHg.  •  There is no evidence of pericardial effusion.          -----------------------------------------------------  RHC/LHC    No results found for this or any previous visit.      ---------------------------------------------------------------------------    CT:   No radiology results for the last 30 days.        --------------------------------------------------------------------------------------------------------------    Laboratory evaluations:  Renal Function: Estimated Creatinine Clearance: 28.1 mL/min (A) (by C-G formula based on SCr of 1.32 mg/dL (H)).    Lab Results   Component Value Date    GLUCOSE 148 (H) 08/28/2024    BUN 25 (H) 08/28/2024    CREATININE 1.32 (H) 08/28/2024    EGFRIFNONA 77 07/29/2021    EGFRIFAFRI 115 01/24/2022    BCR 18.9 08/28/2024    K 5.3 (H) 08/28/2024    CO2 23.0 08/28/2024    CALCIUM 10.7 (H) 08/28/2024    PROTENTOTREF 6.8 05/29/2024    ALBUMIN 3.5 05/29/2024    LABIL2 1.1 05/29/2024    AST 11 05/18/2024    ALT 9 05/18/2024     Lab Results   Component Value Date    WBC 3.24 (L) 05/18/2024    HGB 12.6 05/18/2024    HCT 37.8 05/18/2024    .5 (H) 05/18/2024     (L) 05/18/2024     Lab Results   Component Value Date    HGBA1C 5.4 04/25/2022     Lab Results   Component Value Date    HGBA1C 5.4 04/25/2022    HGBA1C 4.90 06/23/2021    HGBA1C 5.30 05/11/2021     Lab Results   Component Value Date    CREATININE 1.32 (H) 08/28/2024     Lab Results   Component Value Date    IRON 71 01/03/2020    TIBC 372 01/03/2020    FERRITIN 65.00 09/29/2022       Result  Review:  I have personally reviewed the results from the time of this admission to 8/28/2024 12:14 EDT and agree with these findings:  [x]  Laboratory list / accordion  []  Microbiology  [x]  Radiology  [x]  EKG/Telemetry   [x]  Cardiology/Vascular   []  Pathology  [x]  Old records  []  Other:  Most notable findings include:   6/2024 device interrogation:   Presenting: AF/@ 60 BPM Battery: 11 months Sensing, impedance, and thresholds reviewed: WNL Events: None since last remote, ongoing persistent AF known to patient OAC contraindicated d/t GI bleeds. Heart rate histograms reviewed Pacing and detection parameters reviewed Planned follow-up: 10/10/24 device check and provider appt. Remote monitoring: Every 91 days and as needed.             ReDS VOLUMETRIC ASSESSMENT:  ReDs Vest    Performed by: Brooke Clay APRN  Authorized by: Brooke Clay APRN    ReDS value:  21  ReDS Value Description:  21-24 (orange) = Low Normal        Assessment & Plan     1. Pulmonary hypertension    2. Chronic heart failure with preserved ejection fraction    3. Dysfunction of right cardiac ventricle        Pulmonary hypertension-she has HFpEF and HUGO. we will focus on GDMT for heart failure.  No current indications today for the initiation of PAH-specific medications.  However, if she has any further interval negative remodeling of her RV. May consider VQ scan in the future to exclude CTEPH. PFTs were re-ordered as they have not yet been done.     HFpEF.  Today, euvolemic.  BMP checked today, slight worsening renal function and potassium was elevated.  Patient was notified to stop potassium and start taking half dose of the spironolactone.  We will recheck BMP in 1 week  GDMT listed below    3. Right ventricular dysfunction--would like to keep patient on spironolactone. Due to slight decline in renal function, spironolactone was decreased to 12.5mg daily.        NYHA stage C FC-III     Clinical status was assessed and  has remained stable.  Treatment intent: curative   ReDS reading was obtained today.  ReDS result: 21       Today, Patient appears euvolemic. and with perfusion. The patient's hemodynamics are currently acceptable. HR is: normal and is at goal. BP/MAP was reviewed and there is not room for medication up-titration.  The patient's clinical course has been impacted by Arrythmia-persistent afib, untreated HUGO. LVEF: 61-65%.     GDMT Assessment:     GDMT changes recommended today:  decrease spironolactone      BB:   Deferred, history of heart block and bradycardia  Monitor heart rate.  Please call the [facility] for HR<50, dizziness, lightheadedness, syncope     A/A/A:   Hemodynamics do not allow at this time.  Blood pressure 103/56 today.       [id number]:  Not a candidate due to having severe vaginal yeast infections.       MRA:  The patient is FC-NYHA Class III and MRA is indicated.   decrease Spironolactone to 12.5mg daily          Diuretic Regimen:  -DIURETIC:   furosemide (LASIX) 20 mg every day  -Fluid restriction:   -requested  -2000 ml  -Patient has been asked to weigh daily and was provided with a printed diuretic strategy.  -Sodium restriction:   -1,500 mg Na restriction was discussed.      Labs/Diagnostics/Referrals:    Labs -Chem-7   Currently Pending: PFTs       Lifestyle Advice:   - call office if I gain more than 2 pounds in one day or 5 pounds in one week   - keep legs up while sitting   - use salt in moderation   - watch for swelling in feet, ankles and legs every day   - weigh myself daily   -call for concerning s/sx   -- activity or exercise based on tolerance encouraged     CODE STATUS: FULL              Return in about 3 months (around 11/28/2024).              Thank you for allowing me to participate in the care of your patient,         SHAHID aBzan  \[facility] [place] [facility]\"" HEART FAILURE  08/28/24  09:12 EDT      **Deon Disclaimer:**  Much of this encounter note is an electronic transcription/translation  of spoken language to printed text. The electronic translation of spoken language may permit erroneous, or at times, nonsensical words or phrases to be inadvertently transcribed. Although I have reviewed the note for such errors, some may still exist.    The information in this note was reviewed and updated during the visit to be as accurate as possible. As many patients have chronic medical problems, many of their individual problems and ongoing plans do not change significantly from visit to visit.  This information is correct and is consistent with my treatment plan as of today's visit.

## 2024-08-28 NOTE — PROGRESS NOTES
[facility]    Mary Grace Culp MD  [address]  [facility] [address]  [place] [address]    Thank you for asking me to see Brittany Villeda.     History of Present Illness    1.  Pulmonary hypertension  2.  HFpEF    Subjective      Brittany Damian a 89 y.o. female who presents today for pulmonary hypertension, HFpEF.   The patient's most recent 2D TTE showed evidence of pulmonary hypertension and right-sided involvement.  She does have biatrial dilatation.  She was diagnosed with HUGO. She does not use a CPAP. She was diagnosed with complete heart block in the past and required placement of a PPM.  She also was diagnosed with COPD.  She does not routinely follow with pulmonary medicine.  She also was diagnosed with MGUS.    She does have follow-up scheduled now with heme-onc in August.  The patient continues to report exercise intolerance.  Reports chronic bilateral lower extremity edema that is unchanged.  Denies any abdominal distention.    She does report exertional dyspnea/shortness of breath.   Denies any PND.     Review of Systems - Review of Systems   Constitutional: Negative for weight gain and weight loss.   Cardiovascular:  Negative for chest pain, dyspnea on exertion, leg swelling, orthopnea, paroxysmal nocturnal dyspnea and syncope.   Respiratory:  Negative for cough, shortness of breath, sleep disturbances due to breathing and snoring.    Gastrointestinal:  Negative for bloating.   Neurological:  Negative for dizziness, light-headedness and weakness.          Patient Active Problem List   Diagnosis    Non-toxic multinodular goiter    Chronic midline low back pain with right-sided sciatica    Essential hypertension    Gastroesophageal reflux disease without esophagitis    Cataract    Pulmonary hypertension    Diastolic dysfunction    HUGO (obstructive sleep apnea)    Trifascicular block    MGUS (monoclonal gammopathy of unknown significance)    Ulcerative  rectosigmoiditis without complication    Lichen sclerosus    Heart block    Sleep-related hypoxia    Bradycardia    History of atrial fibrillation    Pacemaker    Paroxysmal SVT (supraventricular tachycardia)    History of chest pain    Palpitations    Paroxysmal atrial fibrillation    Ascending aortic aneurysm    Mediastinal lymphadenopathy    Anemia    Obesity (BMI 30-39.9)    Generalized weakness    Dysautonomia    Hypercalcemia    Hyperparathyroidism    Choledocholithiasis    Duodenal ulcer    Hemorrhagic gastritis    Exertional dyspnea    COPD (chronic obstructive pulmonary disease)    Osteoarthritis of glenohumeral joint    ICH (intracerebral hemorrhage)    Lower GI bleed    Thrombocytopenia    Lichen sclerosus of female genitalia    Senile atrophic vaginitis    Left inguinal hernia    Vulvar pain    Bilateral lower extremity edema    Chronic heart failure with preserved ejection fraction    Acute renal insufficiency    Dysfunction of right cardiac ventricle     family history includes Breast cancer in her sister; Diabetes in her mother; Heart disease in her sister; Kidney cancer in her sister; Prostate cancer in her brother, brother, and brother.   reports that she quit smoking about 59 years ago. Her smoking use included cigarettes. She started smoking about 71 years ago. She has a 18 pack-year smoking history. She has been exposed to tobacco smoke. She has never used smokeless tobacco. She reports that she does not drink alcohol and does not use drugs.  Allergies   Allergen Reactions    Codeine Hallucinations     Tolerates hydromorphone    Amitriptyline Rash    Amoxicillin-Pot Clavulanate Rash    Aspirin Unknown - Low Severity     Patient doesn't know why    Carisoprodol-Aspirin-Codeine Palpitations    Iodinated Contrast Media Rash    Latex Rash    Naproxen Rash    Nsaids Unknown - Low Severity     UNSURE OF REACTION    Soma Compound With Codeine [Carisoprodol-Aspirin-Codeine] Rash    Sulfa Antibiotics Rash     Tramadol Palpitations     heart races      Physical Activity: Sufficiently Active (4/26/2024)    Exercise Vital Sign     Days of Exercise per Week: 5 days     Minutes of Exercise per Session: 30 min          Current Outpatient Medications:     acetaminophen (TYLENOL) 500 MG tablet, Take 1 tablet by mouth Every 6 (Six) Hours As Needed for Mild Pain ., Disp: , Rfl:     ELDERBERRY PO, Take 2 tablets by mouth Daily., Disp: , Rfl:     furosemide (LASIX) 40 MG tablet, Take 0.5 tablets by mouth Daily. Taking 20mg, Disp: 90 tablet, Rfl: 1    lidocaine (XYLOCAINE) 5 % ointment, Apply 1 Application topically to the appropriate area as directed Daily., Disp: 50 g, Rfl: 1    Magnesium Oxide -Mg Supplement 400 (240 Mg) MG tablet, Take 1 tablet by mouth Daily., Disp: 90 tablet, Rfl: 1    mesalamine (APRISO) 0.375 g 24 hr capsule, TAKE FOUR CAPSULES BY MOUTH DAILY, Disp: 360 capsule, Rfl: 0    mesalamine (CANASA) 1000 MG suppository, Insert 1 suppository into the rectum Every Night for 42 days., Disp: 30 suppository, Rfl: 0    pantoprazole (PROTONIX) 40 MG EC tablet, Take 1 tablet by mouth Daily., Disp: 90 tablet, Rfl: 1    spironolactone (ALDACTONE) 25 MG tablet, Take 0.5 tablets by mouth Daily., Disp: , Rfl:     tacrolimus (PROTOPIC) 0.1 % ointment, Apply 1 Application topically to the appropriate area as directed 2 (Two) Times a Day., Disp: , Rfl:     valACYclovir (VALTREX) 1000 MG tablet, Take 1 tablet by mouth Daily., Disp: , Rfl:     vitamin B-12 (CYANOCOBALAMIN) 1000 MCG tablet, Take 1 tablet by mouth Daily., Disp: 90 tablet, Rfl: 1    Objective   Vital Sign Review:   Vitals:    08/28/24 1044   BP: 111/64   Pulse: 64   SpO2: 99%     Body mass index is 27.28 kg/m².      08/28/24  1044   Weight: 72.1 kg (159 lb)     Physical Exam:  Constitutional:       Appearance: Normal and healthy appearance.   Neck:      Vascular: No carotid bruit or JVD. JVD normal.   Pulmonary:      Effort: Pulmonary effort is normal.      Breath  sounds: Normal breath sounds.   Cardiovascular:      PMI at left midclavicular line. Normal rate. Regular rhythm.   Pulses:     Intact distal pulses.   Edema:     Peripheral edema absent.   Abdominal:      Palpations: Abdomen is soft.      Tenderness: There is no abdominal tenderness.   Skin:     General: Skin is warm and dry.   Neurological:      General: No focal deficit present.      Mental Status: Alert and oriented to person, place and time.   Psychiatric:         Behavior: Behavior is cooperative.          DATA REVIEWED:   EKG:  ECG 12 Lead     Date/Time: 8/12/2024 1:25 PM  Performed by: Sydney Gilliam APRN     Authorized by: Sydney Gilliam APRN  Comparison: compared with previous ECG   Similar to previous ECG  Rhythm: atrial fibrillation and paced  Rate: normal  BPM: 67  Conduction: right bundle branch block  QRS axis: normal  Comments: Similar to prior    ---------------------------------------------------  ECHO:    Results for orders placed during the hospital encounter of 04/23/24    Adult Transthoracic Echo Complete W/ Cont if Necessary Per Protocol    Interpretation Summary    Left ventricular systolic function is normal. Left ventricular ejection fraction appears to be 61 - 65%.    Left ventricular diastolic function was indeterminate.    The right ventricular cavity is mildly dilated. Normal right ventricular systolic function noted.    The left atrial cavity is severely dilated.    The right atrial cavity is severely dilated.    Saline test results are negative.    Mild aortic valve regurgitation is present.    Mild mitral valve regurgitation is present.    Mild to moderate tricuspid valve regurgitation is present.    Calculated right ventricular systolic pressure from tricuspid regurgitation is 38 mmHg.    There is no evidence of pericardial effusion.          -----------------------------------------------------  [facility]    No results found for this or any previous  visit.      ---------------------------------------------------------------------------    CT:   No radiology results for the last 30 days.        --------------------------------------------------------------------------------------------------------------    Laboratory evaluations:  Renal Function: Estimated Creatinine Clearance: 28.1 mL/min (A) (by C-G formula based on SCr of 1.32 mg/dL (H)).    Lab Results   Component Value Date    GLUCOSE 148 (H) 08/28/2024    BUN 25 (H) 08/28/2024    CREATININE 1.32 (H) 08/28/2024    EGFRIFNONA 77 07/29/2021    EGFRIFAFRI 115 01/24/2022    BCR 18.9 08/28/2024    K 5.3 (H) 08/28/2024    CO2 23.0 08/28/2024    CALCIUM 10.7 (H) 08/28/2024    PROTENTOTREF 6.8 05/29/2024    ALBUMIN 3.5 05/29/2024    LABIL2 1.1 05/29/2024    AST 11 05/18/2024    ALT 9 05/18/2024     Lab Results   Component Value Date    WBC 3.24 (L) 05/18/2024    HGB 12.6 05/18/2024    HCT 37.8 05/18/2024    .5 (H) 05/18/2024     (L) 05/18/2024     Lab Results   Component Value Date    HGBA1C 5.4 04/25/2022     Lab Results   Component Value Date    HGBA1C 5.4 04/25/2022    HGBA1C 4.90 06/23/2021    HGBA1C 5.30 05/11/2021     Lab Results   Component Value Date    CREATININE 1.32 (H) 08/28/2024     Lab Results   Component Value Date    IRON 71 01/03/2020    TIBC 372 01/03/2020    FERRITIN 65.00 09/29/2022       Result Review:  I have personally reviewed the results from the time of this admission to 8/28/2024 12:14 EDT and agree with these findings:  [x]  Laboratory list / accordion  []  Microbiology  [x]  Radiology  [x]  EKG/Telemetry   [x]  Cardiology/Vascular   []  Pathology  [x]  Old records  []  Other:  Most notable findings include:   6/2024 device interrogation:   Presenting: AF/@ 60 BPM Battery: 11 months Sensing, impedance, and thresholds reviewed: WNL Events: None since last remote, ongoing persistent AF known to patient OAC contraindicated d/t GI bleeds. Heart rate histograms reviewed Pacing  and detection parameters reviewed Planned follow-up: 10/10/24 device check and provider appt. Remote monitoring: Every 91 days and as needed.             ReDS VOLUMETRIC ASSESSMENT:  ReDs Vest    Performed by: Brooke Clay APRN  Authorized by: Brooke Clay APRN    ReDS value:  21  ReDS Value Description:  21-24 (orange) = Low Normal        Assessment & Plan      1. Pulmonary hypertension    2. Chronic heart failure with preserved ejection fraction    3. Dysfunction of right cardiac ventricle        Pulmonary hypertension-she has HFpEF and HUGO. we will focus on GDMT for heart failure.  No current indications today for the initiation of PAH-specific medications.  However, if she has any further interval negative remodeling of her RV. May consider VQ scan in the future to exclude CTEPH. PFTs were re-ordered as they have not yet been done.     HFpEF.  Today, euvolemic.  BMP checked today, slight worsening renal function and potassium was elevated.  Patient was notified to stop potassium and start taking half dose of the spironolactone.  We will recheck BMP in 1 week  GDMT listed below    3. Right ventricular dysfunction--would like to keep patient on spironolactone. Due to slight decline in renal function, spironolactone was decreased to 12.5mg daily.        NYHA stage C FC-III     Clinical status was assessed and has remained stable.  Treatment intent: curative   ReDS reading was obtained today.  ReDS result: 21       Today, Patient appears euvolemic. and with perfusion. The patient's hemodynamics are currently acceptable. HR is: normal and is at goal. BP/MAP was reviewed and there is not room for medication up-titration.  The patient's clinical course has been impacted by Arrythmia-persistent afib, untreated HUGO. LVEF: 61-65%.     GDMT Assessment:     GDMT changes recommended today:  decrease spironolactone      BB:   Deferred, history of heart block and bradycardia  Monitor heart rate.  Please call  the [facility] for HR<50, dizziness, lightheadedness, syncope     A/A/A:   Hemodynamics do not allow at this time.  Blood pressure 103/56 today.       [id number]:  Not a candidate due to having severe vaginal yeast infections.       MRA:  The patient is FC-NYHA Class III and MRA is indicated.   decrease Spironolactone to 12.5mg daily          Diuretic Regimen:  -DIURETIC:   furosemide (LASIX) 20 mg every day  -Fluid restriction:   -requested  -2000 ml  -Patient has been asked to weigh daily and was provided with a printed diuretic strategy.  -Sodium restriction:   -1,500 mg Na restriction was discussed.      Labs/Diagnostics/Referrals:    Labs -Chem-7   Currently Pending: PFTs       Lifestyle Advice:   - call office if I gain more than 2 pounds in one day or 5 pounds in one week   - keep legs up while sitting   - use salt in moderation   - watch for swelling in feet, ankles and legs every day   - weigh myself daily   -call for concerning s/sx   -- activity or exercise based on tolerance encouraged     CODE STATUS: FULL              Return in about 3 months (around 11/28/2024).              Thank you for allowing me to participate in the care of your patient,         Brooke Clay, SHAHID  [facility] HE[facility] FAILURE  08/28/24  09:12 EDT      **Deon Disclaimer:**  Much of this encounter note is an electronic transcription/translation of spoken language to printed text. The electronic translation of spoken language may permit erroneous, or at times, nonsensical words or phrases to be inadvertently transcribed. Although I have reviewed the note for such errors, some may still exist.    The information in this note was reviewed and updated during the visit to be as accurate as possible. As many patients have chronic medical problems, many of their individual problems and ongoing plans do not change significantly from visit to visit.  This information is correct and is consistent with my treatment plan as of today's visit.

## 2024-08-29 NOTE — PROGRESS NOTES
Attempted to call patient to discuss results. Had to leave voicemail. Provided instructions as outlined.     Rc Hodge, JamarD, BCACP

## 2024-09-11 ENCOUNTER — OFFICE VISIT (OUTPATIENT)
Dept: FAMILY MEDICINE CLINIC | Facility: CLINIC | Age: 89
End: 2024-09-11
Payer: MEDICARE

## 2024-09-11 ENCOUNTER — HOME HEALTH ADMISSION (OUTPATIENT)
Dept: HOME HEALTH SERVICES | Facility: HOME HEALTHCARE | Age: 89
End: 2024-09-11
Payer: MEDICARE

## 2024-09-11 VITALS
OXYGEN SATURATION: 100 % | HEIGHT: 64 IN | BODY MASS INDEX: 29.19 KG/M2 | SYSTOLIC BLOOD PRESSURE: 108 MMHG | DIASTOLIC BLOOD PRESSURE: 63 MMHG | TEMPERATURE: 98 F | WEIGHT: 171 LBS | HEART RATE: 62 BPM

## 2024-09-11 DIAGNOSIS — I50.32 CHRONIC DIASTOLIC CONGESTIVE HEART FAILURE: ICD-10-CM

## 2024-09-11 DIAGNOSIS — L98.492 SKIN ULCER WITH FAT LAYER EXPOSED: ICD-10-CM

## 2024-09-11 DIAGNOSIS — K51.30 ULCERATIVE RECTOSIGMOIDITIS WITHOUT COMPLICATION: Primary | Chronic | ICD-10-CM

## 2024-09-11 PROCEDURE — 1125F AMNT PAIN NOTED PAIN PRSNT: CPT | Performed by: FAMILY MEDICINE

## 2024-09-11 PROCEDURE — 99214 OFFICE O/P EST MOD 30 MIN: CPT | Performed by: FAMILY MEDICINE

## 2024-09-11 RX ORDER — ZINC OXIDE 20 %
1 OINTMENT (GRAM) TOPICAL AS NEEDED
Qty: 425 G | Refills: 1 | Status: SHIPPED | OUTPATIENT
Start: 2024-09-11

## 2024-09-11 RX ORDER — PREDNISONE 10 MG/1
TABLET ORAL
Qty: 18 TABLET | Refills: 0 | Status: SHIPPED | OUTPATIENT
Start: 2024-09-11 | End: 2024-09-20

## 2024-09-11 NOTE — PROGRESS NOTES
"Chief Complaint  Rectal Bleeding (2 weeks ) and Wound Check (On buttocks )    Subjective        Brittany Villeda presents to [facility]  History of Present Illness    Follow-up multimedical problems.    Persistent atrial fibrillation.  High has bled score.  Not a good candidate for chronic anticoagulation given previous GI bleed and fall risk.    Diastolic CHF.  Continues on spironolactone and furosemide.  Followed by CHF clinic.  And cardiology.    Dilated ascending aorta.  Last check May of this year CT scan demonstrated thoracic aorta ectatic at 4 cm.  Tapers to 3 cm at aortic arch.  And described as stable.    Enlarged mediastinal nodes that are stable on last check.  Also large multinodular goiter stable.    Ulcerative rectosigmoiditis.  Followed by GI, Dr. English.  Last check with them June 2024.  She continues mesalamine p.o. and also suppositories.  Also pantoprazole.  History of GI bleed number of years ago on warfarin.  She is having some ongoing rectal bleeding.  Also rectal pain.  Especially the last couple weeks.  She also has a small decubitus ulcer on her buttocks that is been bothering her.  She is using zinc and needs a refill.  No fever.  Otherwise feels well.              Objective   Vital Signs:  /63   Pulse 62   Temp 98 °F (36.7 °C) (Temporal)   Ht 162.6 cm (64\")   Wt 77.6 kg (171 lb)   SpO2 100%   BMI 29.35 kg/m²   Estimated body mass index is 29.35 kg/m² as calculated from the following:    Height as of this encounter: 162.6 cm (64\").    Weight as of this encounter: 77.6 kg (171 lb).          Physical Exam  Constitutional:       Appearance: Normal appearance.   Cardiovascular:      Rate and Rhythm: Normal rate and regular rhythm.      Heart sounds: Normal heart sounds.      Comments: Appears to be in sinus rhythm.  Pulmonary:      Effort: Pulmonary effort is normal.      Breath sounds: Normal breath sounds.   Genitourinary:     Comments: The " external anal exam was unremarkable.  She has a very small 1 cm decubitus ulcer into the subcutaneous tissue and maybe some fat exposed on the left buttocks.  Musculoskeletal:      Right lower leg: No edema.      Left lower leg: No edema.        Result Review :  The following data was reviewed by: Mega Esparza MD on 09/11/2024:  Common labs          6/12/2024    12:42 7/12/2024    10:32 8/28/2024    10:37   Common Labs   Glucose 99  109  148    BUN 20  21  25    Creatinine 0.93  1.10  1.32    Sodium 138  138  136    Potassium 4.6  4.0  5.3    Chloride 105  102  102    Calcium 10.5  10.5  10.7                Assessment and Plan   Diagnoses and all orders for this visit:    1. 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 Skin ulcer with fat layer exposed  -     Ambulatory Referral to Home Health    3. Chronic diastolic congestive heart failure    Other orders  -     zinc oxide ([employer] Zinc Oxide) 20 % ointment; Apply 1 Application topically to the appropriate area as directed As Needed for Irritation. jar  Dispense: 425 g; Refill: 1  -     predniSONE (DELTASONE) 10 MG tablet; Take 3 tablets by mouth Daily for 3 days, THEN 2 tablets Daily for 3 days, THEN 1 tablet Daily for 3 days.  Dispense: 18 tablet; Refill: 0    Ulcerative proctosigmoiditis.  Continue mesalamine.  I am giving her a moderate prednisone taper.  I want her to follow-up with her gastroenterologist.  Side effects of the prednisone discussed.  At this time benefits outweigh risks.  Continue pantoprazole.    Chronic diastolic ingestive heart failure.  She is doing well with the spironolactone and the furosemide.  Her blood pressure is on the low side.  I am holding off adding an ACE inhibitor or a beta-blocker.  And I will leave Entresto up to the cardiologist.  I do not recommend an SGLT2 inhibitor at this time, I believe the glucosuria would add to her skin breakdown.    Small skin ulcer on the buttocks.  I am to see if a wound  care nurse can get out of her house to help with further treatment.  Otherwise continue zinc oxide, I gave her refill.  I will see her back in 6 months or sooner as needed.         Follow Up   No follow-ups on file.  Patient was given instructions and counseling regarding her condition or for health maintenance advice. Please see specific information pulled into the AVS if appropriate.

## 2024-09-16 ENCOUNTER — HOSPITAL ENCOUNTER (OUTPATIENT)
Dept: RESPIRATORY THERAPY | Facility: HOSPITAL | Age: 89
Discharge: HOME | End: 2024-09-16
Admitting: NURSE PRACTITIONER
Payer: MEDICARE

## 2024-09-16 LAB
BDY SITE: NORMAL
HGB BLDA-MCNC: 14.4 G/DL (ref 12–18)

## 2024-09-16 PROCEDURE — 94726 PLETHYSMOGRAPHY LUNG VOLUMES: CPT

## 2024-09-16 PROCEDURE — 94060 EVALUATION OF WHEEZING: CPT

## 2024-09-16 PROCEDURE — 94640 AIRWAY INHALATION TREATMENT: CPT

## 2024-09-16 PROCEDURE — 82820 HEMOGLOBIN-OXYGEN AFFINITY: CPT | Performed by: NURSE PRACTITIONER

## 2024-09-16 PROCEDURE — 94729 DIFFUSING CAPACITY: CPT

## 2024-09-16 RX ORDER — ALBUTEROL SULFATE 0.83 MG/ML
2.5 SOLUTION RESPIRATORY (INHALATION) ONCE
Status: COMPLETED | OUTPATIENT
Start: 2024-09-16 | End: 2024-09-16

## 2024-09-16 RX ADMIN — ALBUTEROL SULFATE 2.5 MG: 2.5 SOLUTION RESPIRATORY (INHALATION) at 11:59

## 2024-09-19 ENCOUNTER — TELEPHONE (OUTPATIENT)
Dept: CARDIOLOGY | Facility: CLINIC | Age: 89
End: 2024-09-19
Payer: MEDICARE

## 2024-09-19 ENCOUNTER — TELEPHONE (OUTPATIENT)
Dept: GASTROENTEROLOGY | Facility: CLINIC | Age: 89
End: 2024-09-19

## 2024-09-19 DIAGNOSIS — R94.2 ABNORMAL PULMONARY FUNCTION TEST: Primary | ICD-10-CM

## 2024-09-19 NOTE — TELEPHONE ENCOUNTER
Provider: DR VANESSA MOSCOSO     Caller: CONCEPCION SOSA     Relationship to Patient: DAUGHTER    Phone Number: [phone]     Reason for Call: PT'S DAUGHTER CALLED AND STATED THAT HER MOTHER HAS RECTAL BLEEDING AND RECTAL PAIN SHE WOULD LIKE TO SEE DR MOSCOSO OR LARRY MONSALVE AND I OFFERED OCTOBER 9 TH ON WAIT LIST BUT SHE WOULD LIKE HER MOTHER TO BE SEEN TOMORROW IN OFFICE PLEASE ADVISE AND CALL DAUGHTER BACK

## 2024-09-20 ENCOUNTER — HOSPITAL ENCOUNTER (EMERGENCY)
Facility: HOSPITAL | Age: 89
Discharge: HOME | End: 2024-09-20
Attending: EMERGENCY MEDICINE
Payer: MEDICARE

## 2024-09-20 ENCOUNTER — APPOINTMENT (OUTPATIENT)
Dept: CT IMAGING | Facility: HOSPITAL | Age: 89
End: 2024-09-20
Payer: MEDICARE

## 2024-09-20 VITALS
TEMPERATURE: 98.4 F | WEIGHT: 175 LBS | HEIGHT: 64 IN | BODY MASS INDEX: 29.88 KG/M2 | SYSTOLIC BLOOD PRESSURE: 113 MMHG | RESPIRATION RATE: 14 BRPM | DIASTOLIC BLOOD PRESSURE: 59 MMHG | OXYGEN SATURATION: 98 % | HEART RATE: 65 BPM

## 2024-09-20 DIAGNOSIS — K59.00 CONSTIPATION, UNSPECIFIED CONSTIPATION TYPE: ICD-10-CM

## 2024-09-20 DIAGNOSIS — K51.311 ULCERATIVE RECTOSIGMOIDITIS WITH RECTAL BLEEDING: Primary | ICD-10-CM

## 2024-09-20 LAB
ALBUMIN SERPL-MCNC: 3.9 G/DL (ref 3.5–5.2)
ALBUMIN/GLOB SERPL: 1.1 G/DL
ALP SERPL-CCNC: 63 U/L (ref 39–117)
ALT SERPL W P-5'-P-CCNC: 8 U/L (ref 1–33)
ANION GAP SERPL CALCULATED.3IONS-SCNC: 7.4 MMOL/L (ref 5–15)
AST SERPL-CCNC: 12 U/L (ref 1–32)
BACTERIA UR QL AUTO: ABNORMAL /HPF
BASOPHILS # BLD AUTO: 0.01 10*3/MM3 (ref 0–0.2)
BASOPHILS NFR BLD AUTO: 0.2 % (ref 0–1.5)
BILIRUB SERPL-MCNC: 1 MG/DL (ref 0–1.2)
BILIRUB UR QL STRIP: NEGATIVE
BUN SERPL-MCNC: 30 MG/DL (ref 8–23)
BUN/CREAT SERPL: 23.6 (ref 7–25)
CALCIUM SPEC-SCNC: 11.1 MG/DL (ref 8.6–10.5)
CHLORIDE SERPL-SCNC: 99 MMOL/L (ref 98–107)
CLARITY UR: ABNORMAL
CO2 SERPL-SCNC: 28.6 MMOL/L (ref 22–29)
COLOR UR: YELLOW
CREAT SERPL-MCNC: 1.27 MG/DL (ref 0.57–1)
DEPRECATED RDW RBC AUTO: 57.1 FL (ref 37–54)
EGFRCR SERPLBLD CKD-EPI 2021: 40.5 ML/MIN/1.73
EOSINOPHIL # BLD AUTO: 0.02 10*3/MM3 (ref 0–0.4)
EOSINOPHIL NFR BLD AUTO: 0.4 % (ref 0.3–6.2)
ERYTHROCYTE [DISTWIDTH] IN BLOOD BY AUTOMATED COUNT: 14 % (ref 12.3–15.4)
GLOBULIN UR ELPH-MCNC: 3.6 GM/DL
GLUCOSE SERPL-MCNC: 133 MG/DL (ref 65–99)
GLUCOSE UR STRIP-MCNC: NEGATIVE MG/DL
HCT VFR BLD AUTO: 40.1 % (ref 34–46.6)
HGB BLD-MCNC: 13 G/DL (ref 12–15.9)
HGB UR QL STRIP.AUTO: NEGATIVE
HOLD SPECIMEN: NORMAL
HYALINE CASTS UR QL AUTO: ABNORMAL /LPF
IMM GRANULOCYTES # BLD AUTO: 0.01 10*3/MM3 (ref 0–0.05)
IMM GRANULOCYTES NFR BLD AUTO: 0.2 % (ref 0–0.5)
KETONES UR QL STRIP: NEGATIVE
LEUKOCYTE ESTERASE UR QL STRIP.AUTO: ABNORMAL
LIPASE SERPL-CCNC: 21 U/L (ref 13–60)
LYMPHOCYTES # BLD AUTO: 1.74 10*3/MM3 (ref 0.7–3.1)
LYMPHOCYTES NFR BLD AUTO: 33.7 % (ref 19.6–45.3)
MCH RBC QN AUTO: 34.9 PG (ref 26.6–33)
MCHC RBC AUTO-ENTMCNC: 32.4 G/DL (ref 31.5–35.7)
MCV RBC AUTO: 107.5 FL (ref 79–97)
MONOCYTES # BLD AUTO: 0.47 10*3/MM3 (ref 0.1–0.9)
MONOCYTES NFR BLD AUTO: 9.1 % (ref 5–12)
NEUTROPHILS NFR BLD AUTO: 2.92 10*3/MM3 (ref 1.7–7)
NEUTROPHILS NFR BLD AUTO: 56.4 % (ref 42.7–76)
NITRITE UR QL STRIP: NEGATIVE
PH UR STRIP.AUTO: 6 [PH] (ref 5–8)
PLATELET # BLD AUTO: 145 10*3/MM3 (ref 140–450)
PMV BLD AUTO: 9 FL (ref 6–12)
POTASSIUM SERPL-SCNC: 4.4 MMOL/L (ref 3.5–5.2)
PROT SERPL-MCNC: 7.5 G/DL (ref 6–8.5)
PROT UR QL STRIP: NEGATIVE
RBC # BLD AUTO: 3.73 10*6/MM3 (ref 3.77–5.28)
RBC # UR STRIP: ABNORMAL /HPF
REF LAB TEST METHOD: ABNORMAL
SODIUM SERPL-SCNC: 135 MMOL/L (ref 136–145)
SP GR UR STRIP: <=1.005 (ref 1–1.03)
SQUAMOUS #/AREA URNS HPF: ABNORMAL /HPF
UROBILINOGEN UR QL STRIP: ABNORMAL
WBC # UR STRIP: ABNORMAL /HPF
WBC NRBC COR # BLD AUTO: 5.17 10*3/MM3 (ref 3.4–10.8)

## 2024-09-20 PROCEDURE — 83690 ASSAY OF LIPASE: CPT

## 2024-09-20 PROCEDURE — 87086 URINE CULTURE/COLONY COUNT: CPT

## 2024-09-20 PROCEDURE — 99284 EMERGENCY DEPT VISIT MOD MDM: CPT

## 2024-09-20 PROCEDURE — 85025 COMPLETE CBC W/AUTO DIFF WBC: CPT

## 2024-09-20 PROCEDURE — 87088 URINE BACTERIA CULTURE: CPT

## 2024-09-20 PROCEDURE — 81001 URINALYSIS AUTO W/SCOPE: CPT

## 2024-09-20 PROCEDURE — 36415 COLL VENOUS BLD VENIPUNCTURE: CPT

## 2024-09-20 PROCEDURE — 74176 CT ABD & PELVIS W/O CONTRAST: CPT

## 2024-09-20 PROCEDURE — 87186 SC STD MICRODIL/AGAR DIL: CPT

## 2024-09-20 PROCEDURE — 80053 COMPREHEN METABOLIC PANEL: CPT

## 2024-09-20 RX ORDER — DOCUSATE SODIUM 100 MG/1
100 CAPSULE, LIQUID FILLED ORAL 2 TIMES DAILY PRN
Qty: 30 CAPSULE | Refills: 0 | Status: SHIPPED | OUTPATIENT
Start: 2024-09-20

## 2024-09-20 RX ORDER — HYDROCODONE BITARTRATE AND ACETAMINOPHEN 5; 325 MG/1; MG/1
1 TABLET ORAL ONCE AS NEEDED
Status: DISCONTINUED | OUTPATIENT
Start: 2024-09-20 | End: 2024-09-20 | Stop reason: HOSPADM

## 2024-09-20 RX ORDER — TRAMADOL HYDROCHLORIDE 50 MG/1
50 TABLET ORAL EVERY 12 HOURS PRN
Qty: 8 TABLET | Refills: 0 | Status: SHIPPED | OUTPATIENT
Start: 2024-09-20 | End: 2024-09-20

## 2024-09-20 RX ORDER — PREDNISONE 10 MG/1
TABLET ORAL
Qty: 40 TABLET | Refills: 0 | Status: SHIPPED | OUTPATIENT
Start: 2024-09-20 | End: 2024-10-06

## 2024-09-20 RX ORDER — SODIUM CHLORIDE 0.9 % (FLUSH) 0.9 %
10 SYRINGE (ML) INJECTION AS NEEDED
Status: DISCONTINUED | OUTPATIENT
Start: 2024-09-20 | End: 2024-09-20 | Stop reason: HOSPADM

## 2024-09-20 RX ADMIN — HYDROCODONE BITARTRATE AND ACETAMINOPHEN 1 TABLET: 5; 325 TABLET ORAL at 14:51

## 2024-09-20 NOTE — TELEPHONE ENCOUNTER
Called pt's daughter on HIPAA and she reports that her mother saw her pcp on Wed 09/11 and he placed her on a prednisone taper.  She has rectal pain and bleeding. The rectal bleeding just started and pt has completed the prednisone.  After completing the prednisone the symptoms have worsened.   She reports that the pain has gotten worse.  Advised her to take her mother to the ER.  She verb understanding.     Update sent to Ana Cristina MONSALVE and to Dr English.

## 2024-09-22 LAB — BACTERIA SPEC AEROBE CULT: ABNORMAL

## 2024-09-24 ENCOUNTER — TELEPHONE (OUTPATIENT)
Dept: ONCOLOGY | Facility: CLINIC | Age: 89
End: 2024-09-24

## 2024-09-24 NOTE — TELEPHONE ENCOUNTER
Caller: Amina Diana    Relationship to patient: Emergency Contact    Best call back number: [phone]    Chief complaint: ANOTHER DR. APPT THAT DAY    Type of visit: NEW LAB & NEW PATIENT APPT    Requested date: CALL TO R/S     If rescheduling, when is the original appointment: 9/27/2024    Additional notes:

## 2024-09-24 NOTE — TELEPHONE ENCOUNTER
Ct reviewed - no evidence of inflammation seen but constipation noted.  Agree with daily stool softener - use prep H supp nightly if possible for bleeding which may be hemorhoidal or related to constipation.  Also noted is masslike finding in her gluteus muscle - uncertain if this is a source of discomfort for her but she should f/u with her pcp re this finding

## 2024-09-25 RX ORDER — CLONAZEPAM 0.5 MG/1
0.5 TABLET ORAL 2 TIMES DAILY PRN
OUTPATIENT
Start: 2024-09-25

## 2024-09-26 RX ORDER — MESALAMINE 1000 MG/1
SUPPOSITORY RECTAL
Qty: 30 SUPPOSITORY | Refills: 0 | Status: SHIPPED | OUTPATIENT
Start: 2024-09-26

## 2024-09-30 RX ORDER — MESALAMINE 0.38 G/1
1.5 CAPSULE, EXTENDED RELEASE ORAL DAILY
Qty: 360 CAPSULE | Refills: 2 | Status: SHIPPED | OUTPATIENT
Start: 2024-09-30

## 2024-10-10 ENCOUNTER — OFFICE VISIT (OUTPATIENT)
Dept: CARDIOLOGY | Facility: CLINIC | Age: 89
End: 2024-10-10
Payer: MEDICARE

## 2024-10-10 ENCOUNTER — CLINICAL SUPPORT NO REQUIREMENTS (OUTPATIENT)
Age: 89
End: 2024-10-10
Payer: MEDICARE

## 2024-10-10 VITALS
HEART RATE: 60 BPM | WEIGHT: 175 LBS | BODY MASS INDEX: 29.88 KG/M2 | SYSTOLIC BLOOD PRESSURE: 110 MMHG | DIASTOLIC BLOOD PRESSURE: 68 MMHG | HEIGHT: 64 IN

## 2024-10-10 DIAGNOSIS — I48.0 PAROXYSMAL ATRIAL FIBRILLATION: Primary | ICD-10-CM

## 2024-10-10 DIAGNOSIS — I10 ESSENTIAL HYPERTENSION: Chronic | ICD-10-CM

## 2024-10-10 DIAGNOSIS — R00.1 BRADYCARDIA: ICD-10-CM

## 2024-10-10 DIAGNOSIS — I71.21 ANEURYSM OF ASCENDING AORTA WITHOUT RUPTURE: Chronic | ICD-10-CM

## 2024-10-10 DIAGNOSIS — I45.9 HEART BLOCK: Primary | ICD-10-CM

## 2024-10-10 DIAGNOSIS — I50.32 CHRONIC HEART FAILURE WITH PRESERVED EJECTION FRACTION: ICD-10-CM

## 2024-10-10 DIAGNOSIS — G90.1 DYSAUTONOMIA: ICD-10-CM

## 2024-10-10 DIAGNOSIS — G47.33 OSA (OBSTRUCTIVE SLEEP APNEA): ICD-10-CM

## 2024-10-10 NOTE — PROGRESS NOTES
Date of Office Visit: 10/10/2024  Encounter Provider: Mary Grace Culp MD  Place of Service: [facility] CARDIOLOGY  Patient Name: Brittany Villeda  :1935    Chief complaint  Paroxysmal atrial fibrillation, thoracic aortic aneurysm    History of Present Illness  The patient is a pleasant, 89-year-old female with a history of hypertension with hypertensive heart disease, obstructive sleep apnea (on BiPAP) obesity, immobility, pulmonary hypertension, history of nonsustained ventricular tachycardia, and obstructive sleep apnea.  She has a history of diastolic dysfunction, nonsustained ventricular tachycardia, atrial fibrillation and bradycardia.  She had a stress perfusion study that was negative in 2019.  She eventually underwent pacemaker placement 2018.  Echo 2021 with ejection fraction 60% with mild RV dysfunction negative saline injection with severe biatrial enlargement and no significant valvular heart disease.  RVSP was 38 mmHg. CT angiogram of the chest was on 2023 when she was in the emergency room for pleuritic chest pain.  Not show pulmonary emboli.  It was a limited evaluation of the aorta.  Previously noted to be 4.1 cm aorta.  The aorta was reviewed with the aortic root measuring 3.6 cm acing aorta measuring 3.7 cm and dilated hepatic veins consistent with right-sided heart failure were noted On 2024 patient was admitted with edema and found to have congestive heart failure treated with IV diuretics and discharged home on Lasix.  It was also suggested she start Jardiance.  Echo at that time showed an ejection fraction 60 to 65% indeterminate diastolic function, mild valve regurgitation and mild to moderate tricuspid regurgitation RVSP 38 mmHg. .  Patient was seen in the emergency room 2024 with ulcerative rectosigmoiditis and rectal bleeding.  She is otherwise now home.  She is not using CPAP.  She has had no chest pain shortness of breath palpitations  syncope near syncope.  She is doing walking at home for exercise.  Her device check demonstrates 8 months to replacement.  No interim events present.  Remains in atrial fibrillation    Since last visit patient feels well has had no chest pain shortness of breath palpitations syncope near syncope.  She is walking at home doing activities at rest.  She has mild edema.  Pacemaker battery is 8 months to replacement.  Will recheck tomorrow frequently    Past Medical History:   Diagnosis Date    Abdominal aortic aneurysm     Anemia     Arrhythmia     Arthritis     Asthma     Bradycardia     CHF (congestive heart failure) 04/2023    Choledocholithiasis 05/09/2021    Colitis     Diastolic dysfunction     Essential hypertension 05/12/2016    GERD (gastroesophageal reflux disease)     Heart block     Hypertension     Hypertensive heart disease     Hyperthyroidism     REPORTS ENLARGED    Inguinal hernia     Kidney stone     Leukopenia     MGUS (monoclonal gammopathy of unknown significance)     Nephrolithiasis     Pacemaker     Paroxysmal atrial fibrillation     Peptic ulceration     Pressure ulcer     REPORTS ON COCCYX. INSTR TO NOTIFY DR BAIRES'S OFFICE    PSVT (paroxysmal supraventricular tachycardia)     Pulmonary hypertension     Rectal bleed     Sleep apnea     NO DEVICE    Subdural hematoma     Systemic hypertension     Trifascicular block     Ulcerative rectosigmoiditis without complication     Ventricular tachycardia     nonsustained     Past Surgical History:   Procedure Laterality Date    BACK SURGERY      lumbar fusion    PAWAN HOLE Left 08/08/2021    Procedure: Left-sided pawan holes for evacuation of subdural hematoma;  Surgeon: Gustavo Callaway MD;  Location: [facility] MAIN OR;  Service: Neurosurgery;  Laterality: Left;    CARDIAC ELECTROPHYSIOLOGY PROCEDURE N/A 11/07/2018    Procedure: Pacemaker DC new   [place];  Surgeon: Jesus Granda MD;  Location: [facility] CATH INVASIVE LOCATION;  Service: Cardiology     CHOLECYSTECTOMY      COLONOSCOPY  06/16/2014    colitis, cryptitis,  tics, NBIH, TA w/low grade dysplasia    COLONOSCOPY N/A 10/28/2019    Procedure: COLONOSCOPY WITH COLD AND HOT POLYPECTOMIES;  Surgeon: Micaela English MD;  Location[facility] ENDOSCOPY;  Service: Gastroenterology    ENDOSCOPY N/A 05/13/2021    Procedure: ESOPHAGOGASTRODUODENOSCOPY with BX;  Surgeon: Stefan Mcfadden MD;  Location: [facility] ENDOSCOPY;  Service: Gastroenterology;  Laterality: N/A;  EPIGASTRIC PAIN  --DUODENAL ULCER, HEMORRHAGIC GASTRITIS, HIATAL HERNIA     ENDOSCOPY N/A 10/11/2022    Procedure: ESOPHAGOGASTRODUODENOSCOPY;  Surgeon: Micaela English MD;  Location[facility] END[facility];  Service: Gastroenterology;  Laterality: N/A;  PRE- MELENA  POST- ESOPHAGITIS    HEMORRHOIDECTOMY      HERNIA REPAIR      umbilical    HYSTERECTOMY      INGUINAL HERNIA REPAIR Left 9/1/2023    Procedure: INGUINAL HERNIA REPAIR LEFT;  Surgeon: Antonio Foster MD;  Location: [facility] OR [facility];  Service: General;  Laterality: Left;    JOINT REPLACEMENT Bilateral     KNEES    MYOMECTOMY      PACEMAKER IMPLANTATION      SHOULDER SURGERY      SIGMOIDOSCOPY N/A 11/02/2022    Procedure: SIGMOIDOSCOPY FLEXIBLE WITH COLD BIOPSIES AND ASPIRATE;  Surgeon: Micaela English MD;  Location: [facility] ENDOSCOPY;  Service: Gastroenterology;  Laterality: N/A;  PRE- RECTAL BLEEDING  POST- HEMORRHOIDS, DIVERTICULOSIS, COLITIS    SINUS SURGERY      TOE NAIL AMPUTATION  03/04/2019    TONSILLECTOMY       Outpatient Medications Prior to Visit   Medication Sig Dispense Refill    acetaminophen (TYLENOL) 500 MG tablet Take 1 tablet by mouth Every 6 (Six) Hours As Needed for Mild Pain .      ELDERBERRY PO Take 2 tablets by mouth Daily.      furosemide (LASIX) 40 MG tablet Take 0.5 tablets by mouth Daily. Taking 20mg 90 tablet 1    Lidocaine 50mg suppository Insert 1 suppository into the rectum Daily As Needed (rectal pain). 14 suppository 0    Magnesium Oxide -Mg Supplement 400 (240  Mg) MG tablet Take 1 tablet by mouth Daily. 90 tablet 1    mesalamine (APRISO) 0.375 g 24 hr capsule TAKE FOUR CAPSULES BY MOUTH DAILY 360 capsule 2    mesalamine (CANASA) 1000 MG suppository UNWRAP AND INSERT 1 SUPPOSITORY RECTALLY EVERY NIGHT AT BEDTIME 30 suppository 0    pantoprazole (PROTONIX) 40 MG EC tablet Take 1 tablet by mouth Daily. 90 tablet 1    spironolactone (ALDACTONE) 25 MG tablet Take 0.5 tablets by mouth Daily.      tacrolimus (PROTOPIC) 0.1 % ointment Apply 1 Application topically to the appropriate area as directed 2 (Two) Times a Day.      valACYclovir (VALTREX) 1000 MG tablet Take 1 tablet by mouth Daily.      vitamin B-12 (CYANOCOBALAMIN) 1000 MCG tablet Take 1 tablet by mouth Daily. 90 tablet 1    zinc oxide ([employer] Zinc Oxide) 20 % ointment Apply 1 Application topically to the appropriate area as directed As Needed for Irritation. jar 425 g 1    docusate sodium (COLACE) 100 MG capsule Take 1 capsule by mouth 2 (Two) Times a Day As Needed for Constipation. 30 capsule 0    lidocaine (XYLOCAINE) 5 % ointment Apply 1 Application topically to the appropriate area as directed Daily. 50 g 1     No facility-administered medications prior to visit.       Allergies as of 10/10/2024 - Reviewed 10/10/2024   Allergen Reaction Noted    Codeine Hallucinations 11/30/2017    Amitriptyline Rash 05/12/2016    Amoxicillin-pot clavulanate Rash 05/12/2016    Aspirin Unknown - Low Severity 05/12/2016    Carisoprodol-aspirin-codeine Palpitations 09/12/2016    Iodinated contrast media Rash 05/12/2016    Latex Rash 09/12/2016    Naproxen Rash 05/12/2016    Nsaids Unknown - Low Severity 05/12/2016    Soma compound with codeine [carisoprodol-aspirin-codeine] Rash 09/12/2016    Sulfa antibiotics Rash 05/12/2016    Tramadol Palpitations 04/12/2019     Social History     Socioeconomic History    Marital status:     Number of children: 10    Years of education: High School   Tobacco Use    Smoking status: Former  "    Current packs/day: 0.00     Average packs/day: 1.5 packs/day for 12.0 years (18.0 ttl pk-yrs)     Types: Cigarettes     Start date:      Quit date:      Years since quittin.8     Passive exposure: Past    Smokeless tobacco: Never    Tobacco comments:     QUIT SMOKING    Vaping Use    Vaping status: Never Used   Substance and Sexual Activity    Alcohol use: No     Comment: caffeine - decaf coffee    Drug use: Never    Sexual activity: Defer     Family History   Problem Relation Age of Onset    Diabetes Mother     Breast cancer Sister     Kidney cancer Sister     Heart disease Sister     Prostate cancer Brother     Prostate cancer Brother     Prostate cancer Brother     Malig Hyperthermia Neg Hx      Review of Systems   Constitutional: Negative for chills, fever, weight gain and weight loss.   Cardiovascular:  Negative for leg swelling.   Respiratory:  Negative for cough, snoring and wheezing.    Hematologic/Lymphatic: Negative for bleeding problem. Does not bruise/bleed easily.   Skin:  Negative for color change.   Musculoskeletal:  Negative for falls, joint pain and myalgias.   Gastrointestinal:  Negative for melena.   Genitourinary:  Negative for hematuria.   Neurological:  Negative for excessive daytime sleepiness.   Psychiatric/Behavioral:  Negative for depression. The patient is not nervous/anxious.         Objective:     Vitals:    10/10/24 0901   BP: 110/68   Pulse: 60   Weight: 79.4 kg (175 lb)   Height: 162.6 cm (64\")     Body mass index is 30.04 kg/m².    Vitals reviewed.   Constitutional:       Appearance: Well-developed. Obese.   Eyes:      General: No scleral icterus.        Right eye: No discharge.      Conjunctiva/sclera: Conjunctivae normal.      Pupils: Pupils are equal, round, and reactive to light.   HENT:      Head: Normocephalic.      Nose: Nose normal.   Neck:      Thyroid: No thyromegaly.      Vascular: No JVD.   Pulmonary:      Effort: Pulmonary effort is normal. No " respiratory distress.      Breath sounds: Normal breath sounds. No wheezing. No rales.   Cardiovascular:      Normal rate. Regular rhythm. Normal S1. Normal S2.       Murmurs: There is no murmur.      No gallop.    Pulses:     Intact distal pulses.      Carotid: 2+ bilaterally.     Radial: 2+ bilaterally.     Femoral: 2+ bilaterally.     Popliteal: 2+ bilaterally.     Dorsalis pedis: 2+ bilaterally.     Posterior tibial: 2+ bilaterally.  Edema:     Peripheral edema absent.   Abdominal:      General: Bowel sounds are normal. There is no distension.      Palpations: Abdomen is soft.      Tenderness: There is no abdominal tenderness. There is no rebound.   Musculoskeletal: Normal range of motion.         General: No tenderness.      Cervical back: Normal range of motion and neck supple. Skin:     General: Skin is warm and dry.      Findings: No erythema or rash.   Neurological:      Mental Status: Alert and oriented to person, place, and time.   Psychiatric:         Behavior: Behavior normal.         Thought Content: Thought content normal.         Judgment: Judgment normal.       Lab Review:   Lab Results - Last 18 Months   Lab Units 10/18/24  1120 09/20/24  1224 04/23/24  1845 04/22/24  1011   WBC 10*3/mm3 3.27* 5.17   < > 3.4   RBC 10*6/mm3 3.52* 3.73*   < > 3.84   HEMOGLOBIN g/dL 12.7 13.0   < > 13.2   HEMATOCRIT % 37.8 40.1   < > 39.5   MCV fL 107.4* 107.5*   < > 103*   MCH pg 36.1* 34.9*   < > 34.4*   MCHC g/dL 33.6 32.4   < > 33.4   RDW % 14.9 14.0   < > 13.2   PLATELETS 10*3/mm3 128* 145   < > 127*   NEUTROPHIL % % 44.7 56.4   < > 35   LYMPHOCYTE % % 42.8 33.7   < > 55   MONOCYTES % % 11.3 9.1   < > 10   EOSINOPHIL % % 0.3 0.4   < > 0   BASOPHIL % % 0.3 0.2   < > 0   NEUTROS ABS 10*3/mm3 1.46* 2.92   < > 1.2*   LYMPHS ABS 10*3/mm3 1.40 1.74   < > 1.9   MONOS ABS 10*3/mm3 0.37 0.47   < > 0.3   EOS ABS 10*3/mm3 0.01 0.02   < > 0.0   BASOS ABS 10*3/mm3 0.01 0.01   < > 0.0   IMM GRAN % %  --   --   --  0   IMMATURE  GRANS (ABS) x10E3/uL  --   --   --  0.0   RDW-SD fl 59.7* 57.1*   < >  --    MPV fL 9.3 9.0   < >  --     < > = values in this interval not displayed.       Lab Results - Last 18 Months   Lab Units 10/18/24  1214 09/20/24  1224   GLUCOSE mg/dL 95 133*   BUN mg/dL 22 30*   CREATININE mg/dL 1.14* 1.27*   SODIUM mmol/L 135* 135*   POTASSIUM mmol/L 4.8 4.4   CHLORIDE mmol/L 101 99   CO2 mmol/L 26.9 28.6   CALCIUM mg/dL 10.1 11.1*   TOTAL PROTEIN g/dL 6.9 7.5   ALBUMIN g/dL 3.7  3.4 3.9   ALT (SGPT) U/L 5 8   AST (SGOT) U/L 15 12   ALK PHOS U/L 61 63   BILIRUBIN mg/dL 1.4* 1.0   GLOBULIN gm/dL 3.2 3.6   A/G RATIO g/dL 1.2 1.1   BUN / CREAT RATIO  19.3 23.6   ANION GAP mmol/L 7.1 7.4   EGFR mL/min/1.73 46.1* 40.5*       Lab Results - Last 18 Months   Lab Units 04/25/24  2054 04/23/24  1845   PROBNP pg/mL 535.0 579.0     Lab Results - Last 18 Months   Lab Units 05/18/24  1304 05/18/24  1109   HSTROP T ng/L 23* 23*     Lab Results - Last 18 Months   Lab Units 04/25/24  0725   TSH uIU/mL 0.817     Lab Results - Last 18 Months   Lab Units 05/18/24  0954   PROTIME Seconds 15.2*       ECG 12 Lead    Date/Time: 10/10/2024 9:19 AM  Performed by: Mary Grace Culp MD    Authorized by: Mary Grace Culp MD  Comparison: compared with previous ECG   Similar to previous ECG  Rhythm: atrial fibrillation  Pacing: ventricular paced rhythm  Clinical impression: abnormal EKG            Diagnosis Plan   1. Aneurysm of ascending aorta without rupture        2. Bradycardia        3. Chronic heart failure with preserved ejection fraction        4. Essential hypertension        5. Paroxysmal atrial fibrillation        6. Dysautonomia        7. HUGO (obstructive sleep apnea)          Plan:       1.  Persistent atrial fibrillation with reasonable rate control.  She is felt to be poor candidate for anticoagulation due to history of GI bleed and decreased mobility.  2.  Diastolic CHF.  Also followed by the heart failure clinic.  Doing quite well on the  current regimen.  Minimal edema.  3.  Pulmonary hypertension.  Being addressed by Dr. Win with plans for VQ scan if she has further remodeling of the RV.  Pulmonary function test will be ordered/2024  3.  Status post permanent pacemaker placement 2018.  Battery replacement anticipated in the next several months.  Being followed by pacemaker clinic  4.  History of hyperkalemia.  Potassium had been continued on spironolactone decreased  5..  Dilated asending aorta.  Ascending aorta measured 3.7 cm by CT 4/2023.  Patient did not complete follow-up imaging as previously recommended though had CT angiogram of the chest when she presented to the emergency room in 5/2024.  This showed an ascending aorta measuring 4 cm.  Pulmonary artery enlarged to 3.8 cm and findings consistent with pulmonary arterial hypertension.  Elevated right hemidiaphragm with compression atelectasis noted.  Mediastinal adenopathy and extensive multinodular goiter noted.  No new changes noted.  7.  History of RV dysfunction and pulmonary hypertension.  Echo on 4/2024 with RVSP 38 mmHg RV dilated but with normal RV function.  Mild valve regurgitation noted with normal systolic function.  Diastolic function indeterminate.  8.  Untreated HUGO, untreated  9.  Rectal bleeding with hemorroids.  No recent events  10.  Mediastinal adenopathy.  Followed by Dr. Esparza stable on most recent CT scan.  11.  Debilitated state.  Slowly ambulating around her home.  12.  Thyromegaly with tracheal mass effect.  Additional recommendations per Dr. Esparza                Your medication list            Accurate as of October 10, 2024 11:59 PM. If you have any questions, ask your nurse or doctor.                CONTINUE taking these medications        Instructions Last Dose Given Next Dose Due   acetaminophen 500 MG tablet  Commonly known as: TYLENOL      Take 1 tablet by mouth Every 6 (Six) Hours As Needed for Mild Pain .       docusate sodium 100 MG capsule  Commonly  known as: COLACE      Take 1 capsule by mouth 2 (Two) Times a Day As Needed for Constipation.       ELDERBERRY PO      Take 2 tablets by mouth Daily.       furosemide 40 MG tablet  Commonly known as: LASIX      Take 0.5 tablets by mouth Daily. Taking 20mg       lidocaine 5 % ointment  Commonly known as: XYLOCAINE      Apply 1 Application topically to the appropriate area as directed Daily.       Lidocaine 50 MG rectal suppository      Insert 1 suppository into the rectum Daily As Needed (rectal pain).       Magnesium Oxide -Mg Supplement 400 (240 Mg) MG tablet      Take 1 tablet by mouth Daily.       mesalamine 1000 MG suppository  Commonly known as: CANASA      UNWRAP AND INSERT 1 SUPPOSITORY RECTALLY EVERY NIGHT AT BEDTIME       mesalamine 0.375 g 24 hr capsule  Commonly known as: APRISO      TAKE FOUR CAPSULES BY MOUTH DAILY       pantoprazole 40 MG EC tablet  Commonly known as: PROTONIX      Take 1 tablet by mouth Daily.       spironolactone 25 MG tablet  Commonly known as: ALDACTONE      Take 0.5 tablets by mouth Daily.       tacrolimus 0.1 % ointment  Commonly known as: PROTOPIC      Apply 1 Application topically to the appropriate area as directed 2 (Two) Times a Day.       valACYclovir 1000 MG tablet  Commonly known as: VALTREX      Take 1 tablet by mouth Daily.       vitamin B-12 1000 MCG tablet  Commonly known as: CYANOCOBALAMIN      Take 1 tablet by mouth Daily.       zinc oxide 20 % ointment  Commonly known as: [employer] Zinc Oxide      Apply 1 Application topically to the appropriate area as directed As Needed for Irritation. jar                Patient is no longer taking -.  I corrected the med list to reflect this.  I did not stop these medications.      Dictated utilizing Dragon dictation

## 2024-10-15 ENCOUNTER — TELEPHONE (OUTPATIENT)
Dept: FAMILY MEDICINE CLINIC | Facility: CLINIC | Age: 89
End: 2024-10-15
Payer: MEDICARE

## 2024-10-15 DIAGNOSIS — L89.159 PRESSURE INJURY OF SKIN OF SACRAL REGION, UNSPECIFIED INJURY STAGE: Primary | ICD-10-CM

## 2024-10-15 RX ORDER — LIDOCAINE 50 MG/G
1 OINTMENT TOPICAL DAILY
Qty: 50 G | Refills: 1 | Status: SHIPPED | OUTPATIENT
Start: 2024-10-15

## 2024-10-15 RX ORDER — DOCUSATE SODIUM 100 MG/1
100 CAPSULE, LIQUID FILLED ORAL 2 TIMES DAILY PRN
Qty: 60 CAPSULE | Refills: 5 | Status: SHIPPED | OUTPATIENT
Start: 2024-10-15

## 2024-10-15 NOTE — TELEPHONE ENCOUNTER
Please advise for treatment I have a home health and a wound care order below but not sure which one to use

## 2024-10-18 ENCOUNTER — CONSULT (OUTPATIENT)
Dept: ONCOLOGY | Facility: CLINIC | Age: 89
End: 2024-10-18
Payer: MEDICARE

## 2024-10-18 ENCOUNTER — LAB (OUTPATIENT)
Dept: LAB | Facility: HOSPITAL | Age: 89
End: 2024-10-18
Payer: MEDICARE

## 2024-10-18 VITALS
WEIGHT: 171.3 LBS | HEIGHT: 64 IN | TEMPERATURE: 98.2 F | DIASTOLIC BLOOD PRESSURE: 59 MMHG | BODY MASS INDEX: 29.24 KG/M2 | SYSTOLIC BLOOD PRESSURE: 95 MMHG | OXYGEN SATURATION: 100 % | RESPIRATION RATE: 18 BRPM | HEART RATE: 66 BPM

## 2024-10-18 DIAGNOSIS — R79.89 ABNORMAL CBC: Primary | ICD-10-CM

## 2024-10-18 DIAGNOSIS — D47.2 MGUS (MONOCLONAL GAMMOPATHY OF UNKNOWN SIGNIFICANCE): Primary | ICD-10-CM

## 2024-10-18 LAB
ALBUMIN SERPL-MCNC: 3.7 G/DL (ref 3.5–5.2)
ALBUMIN/GLOB SERPL: 1.2 G/DL
ALP SERPL-CCNC: 61 U/L (ref 39–117)
ALT SERPL W P-5'-P-CCNC: 5 U/L (ref 1–33)
ANION GAP SERPL CALCULATED.3IONS-SCNC: 7.1 MMOL/L (ref 5–15)
AST SERPL-CCNC: 15 U/L (ref 1–32)
BASOPHILS # BLD AUTO: 0.01 10*3/MM3 (ref 0–0.2)
BASOPHILS NFR BLD AUTO: 0.3 % (ref 0–1.5)
BILIRUB SERPL-MCNC: 1.4 MG/DL (ref 0–1.2)
BUN SERPL-MCNC: 22 MG/DL (ref 8–23)
BUN/CREAT SERPL: 19.3 (ref 7–25)
CALCIUM SPEC-SCNC: 10.1 MG/DL (ref 8.6–10.5)
CHLORIDE SERPL-SCNC: 101 MMOL/L (ref 98–107)
CO2 SERPL-SCNC: 26.9 MMOL/L (ref 22–29)
CREAT SERPL-MCNC: 1.14 MG/DL (ref 0.57–1)
DEPRECATED RDW RBC AUTO: 59.7 FL (ref 37–54)
EGFRCR SERPLBLD CKD-EPI 2021: 46.1 ML/MIN/1.73
EOSINOPHIL # BLD AUTO: 0.01 10*3/MM3 (ref 0–0.4)
EOSINOPHIL NFR BLD AUTO: 0.3 % (ref 0.3–6.2)
ERYTHROCYTE [DISTWIDTH] IN BLOOD BY AUTOMATED COUNT: 14.9 % (ref 12.3–15.4)
GLOBULIN UR ELPH-MCNC: 3.2 GM/DL
GLUCOSE SERPL-MCNC: 95 MG/DL (ref 65–99)
HCT VFR BLD AUTO: 37.8 % (ref 34–46.6)
HGB BLD-MCNC: 12.7 G/DL (ref 12–15.9)
IMM GRANULOCYTES # BLD AUTO: 0.02 10*3/MM3 (ref 0–0.05)
IMM GRANULOCYTES NFR BLD AUTO: 0.6 % (ref 0–0.5)
LYMPHOCYTES # BLD AUTO: 1.4 10*3/MM3 (ref 0.7–3.1)
LYMPHOCYTES NFR BLD AUTO: 42.8 % (ref 19.6–45.3)
MCH RBC QN AUTO: 36.1 PG (ref 26.6–33)
MCHC RBC AUTO-ENTMCNC: 33.6 G/DL (ref 31.5–35.7)
MCV RBC AUTO: 107.4 FL (ref 79–97)
MONOCYTES # BLD AUTO: 0.37 10*3/MM3 (ref 0.1–0.9)
MONOCYTES NFR BLD AUTO: 11.3 % (ref 5–12)
NEUTROPHILS NFR BLD AUTO: 1.46 10*3/MM3 (ref 1.7–7)
NEUTROPHILS NFR BLD AUTO: 44.7 % (ref 42.7–76)
NRBC BLD AUTO-RTO: 0 /100 WBC (ref 0–0.2)
PLATELET # BLD AUTO: 128 10*3/MM3 (ref 140–450)
PMV BLD AUTO: 9.3 FL (ref 6–12)
POTASSIUM SERPL-SCNC: 4.8 MMOL/L (ref 3.5–5.2)
PROT SERPL-MCNC: 6.9 G/DL (ref 6–8.5)
RBC # BLD AUTO: 3.52 10*6/MM3 (ref 3.77–5.28)
SODIUM SERPL-SCNC: 135 MMOL/L (ref 136–145)
WBC NRBC COR # BLD AUTO: 3.27 10*3/MM3 (ref 3.4–10.8)

## 2024-10-18 PROCEDURE — 80053 COMPREHEN METABOLIC PANEL: CPT | Performed by: INTERNAL MEDICINE

## 2024-10-18 PROCEDURE — 85025 COMPLETE CBC W/AUTO DIFF WBC: CPT

## 2024-10-18 PROCEDURE — 36415 COLL VENOUS BLD VENIPUNCTURE: CPT

## 2024-10-18 NOTE — PROGRESS NOTES
[facility]    CONSULTING IN BLOOD DISORDERS & CANCER      REASON FOR CONSULTATION/CHIEF COMPLAINT:     Evaluation and management for MGUS                             REQUESTING PHYSICIAN: Mark Win, *  RECORDS OBTAINED:  Records of the patients history including those from the electronic medical record were reviewed and summarized in detail.    HISTORY OF PRESENT ILLNESS:    The patient is a 89 y.o. year old female with medical history significant for CHF, ulcerative colitis, A-fib s/p pacemaker and pulmonary hypertension comes for MGUS evaluation and management.    Patient was seen by Dr. Win, cardiologist in May 2024 for CHF evaluation.  Given concern for ATTR amyloidosis, SPEP/DMITRIY and UPEP/DMITRIY were performed.  The SPEP/DMITRIY showed a small quantity of monoclonal protein, unable to quantitate the M spike.  On immunofixation, this was an IgA lambda monoclonal protein.  Free light chains elevated (Kappa 42.7, lambda 44.6), however normal FLC ratio.  24-hour UPEP/DMITRIY did not show any M spike.    Patient was referred to hematology for further evaluation and management.  She is in a wheelchair.  Accompanied by her daughter.  Reports of dyspnea on exertion.  Has bilateral lower extremity edema.  On Lasix and spironolactone per cardiology.  Patient went also has chronic ulcerative colitis for which she uses oral and rectal mesalamine.    Past Medical History:   Diagnosis Date    Abdominal aortic aneurysm     Anemia     Arrhythmia     Arthritis     Asthma     Bradycardia     CHF (congestive heart failure) 04/2023    Choledocholithiasis 05/09/2021    Colitis     Diastolic dysfunction     Essential hypertension 05/12/2016    GERD (gastroesophageal reflux disease)     Heart block     Hypertension     Hypertensive heart disease     Hyperthyroidism     REPORTS ENLARGED    Inguinal hernia     Kidney stone     Leukopenia     MGUS (monoclonal gammopathy of unknown significance)     Nephrolithiasis     Pacemaker      Paroxysmal atrial fibrillation     Peptic ulceration     Pressure ulcer     REPORTS ON COCCYX. INSTR TO NOTIFY DR FOSTER'S OFFICE    PSVT (paroxysmal supraventricular tachycardia)     Pulmonary hypertension     Rectal bleed     Sleep apnea     NO DEVICE    Subdural hematoma     Systemic hypertension     Trifascicular block     Ulcerative rectosigmoiditis without complication     Ventricular tachycardia     nonsustained     Past Surgical History:   Procedure Laterality Date    BACK SURGERY      lumbar fusion    DUSTY HOLE Left 08/08/2021    Procedure: Left-sided dusty holes for evacuation of subdural hematoma;  Surgeon: Gustavo Callaway MD;  Location: [facility] MAIN OR;  Service: Neurosurgery;  Laterality: Left;    CARDIAC ELECTROPHYSIOLOGY PROCEDURE N/A 11/07/2018    Procedure: Pacemaker DC new   [place];  Surgeon: Jesus Granda MD;  Location: [facility] CATH INVASIVE LOCATION;  Service: Cardiology    CHOLECYSTECTOMY      COLONOSCOPY  06/16/2014    colitis, cryptitis,  tics, NBIH, TA w/low grade dysplasia    COLONOSCOPY N/A 10/28/2019    Procedure: COLONOSCOPY WITH COLD AND HOT POLYPECTOMIES;  Surgeon: Micaela English MD;  Location: [facility] ENDOSCOPY;  Service: Gastroenterology    ENDOSCOPY N/A 05/13/2021    Procedure: ESOPHAGOGASTRODUODENOSCOPY with BX;  Surgeon: Stefan Mcfadden MD;  Location: [facility] ENDOSCOPY;  Service: Gastroenterology;  Laterality: N/A;  EPIGASTRIC PAIN  --DUODENAL ULCER, HEMORRHAGIC GASTRITIS, HIATAL HERNIA     ENDOSCOPY N/A 10/11/2022    Procedure: ESOPHAGOGASTRODUODENOSCOPY;  Surgeon: Micaela English MD;  [facility];  Service: Gastroenterology;  Laterality: N/A;  PRE- MELENA  POST- ESOPHAGITIS    HEMORRHOIDECTOMY      HERNIA REPAIR      umbilical    HYSTERECTOMY      INGUINAL HERNIA REPAIR Left 9/1/2023    Procedure: INGUINAL HERNIA REPAIR LEFT;  Surgeon: Antonio Foster MD;  [facility];  Service: General;  Laterality: Left;    JOINT REPLACEMENT  Bilateral     KNEES    MYOMECTOMY      PACEMAKER IMPLANTATION      SHOULDER SURGERY      SIGMOIDOSCOPY N/A 11/02/2022    Procedure: SIGMOIDOSCOPY FLEXIBLE WITH COLD BIOPSIES AND ASPIRATE;  Surgeon: Micaela English MD;  Location: [facility] ENDOSCOPY;  Service: Gastroenterology;  Laterality: N/A;  PRE- RECTAL BLEEDING  POST- HEMORRHOIDS, DIVERTICULOSIS, COLITIS    SINUS SURGERY      TOE NAIL AMPUTATION  03/04/2019    TONSILLECTOMY         MEDICATIONS    Current Outpatient Medications:     acetaminophen (TYLENOL) 500 MG tablet, Take 1 tablet by mouth Every 6 (Six) Hours As Needed for Mild Pain ., Disp: , Rfl:     docusate sodium (COLACE) 100 MG capsule, Take 1 capsule by mouth 2 (Two) Times a Day As Needed for Constipation., Disp: 60 capsule, Rfl: 5    ELDERBERRY PO, Take 2 tablets by mouth Daily., Disp: , Rfl:     furosemide (LASIX) 40 MG tablet, Take 0.5 tablets by mouth Daily. Taking 20mg, Disp: 90 tablet, Rfl: 1    lidocaine (XYLOCAINE) 5 % ointment, Apply 1 Application topically to the appropriate area as directed Daily., Disp: 50 g, Rfl: 1    Lidocaine 50mg suppository, Insert 1 suppository into the rectum Daily As Needed (rectal pain)., Disp: 14 suppository, Rfl: 0    Magnesium Oxide -Mg Supplement 400 (240 Mg) MG tablet, Take 1 tablet by mouth Daily., Disp: 90 tablet, Rfl: 1    mesalamine (APRISO) 0.375 g 24 hr capsule, TAKE FOUR CAPSULES BY MOUTH DAILY, Disp: 360 capsule, Rfl: 2    mesalamine (CANASA) 1000 MG suppository, UNWRAP AND INSERT 1 SUPPOSITORY RECTALLY EVERY NIGHT AT BEDTIME, Disp: 30 suppository, Rfl: 0    pantoprazole (PROTONIX) 40 MG EC tablet, Take 1 tablet by mouth Daily., Disp: 90 tablet, Rfl: 1    spironolactone (ALDACTONE) 25 MG tablet, Take 0.5 tablets by mouth Daily., Disp: , Rfl:     tacrolimus (PROTOPIC) 0.1 % ointment, Apply 1 Application topically to the appropriate area as directed 2 (Two) Times a Day., Disp: , Rfl:     valACYclovir (VALTREX) 1000 MG tablet, Take 1 tablet by mouth  Daily., Disp: , Rfl:     vitamin B-12 (CYANOCOBALAMIN) 1000 MCG tablet, Take 1 tablet by mouth Daily., Disp: 90 tablet, Rfl: 1    zinc oxide ([employer] Zinc Oxide) 20 % ointment, Apply 1 Application topically to the appropriate area as directed As Needed for Irritation. jar, Disp: 425 g, Rfl: 1    ALLERGIES:     Allergies   Allergen Reactions    Codeine Hallucinations     Tolerates hydromorphone    Amitriptyline Rash    Amoxicillin-Pot Clavulanate Rash    Aspirin Unknown - Low Severity     Patient doesn't know why    Carisoprodol-Aspirin-Codeine Palpitations    Iodinated Contrast Media Rash    Latex Rash    Naproxen Rash    Nsaids Unknown - Low Severity     UNSURE OF REACTION    Soma Compound With Codeine [Carisoprodol-Aspirin-Codeine] Rash    Sulfa Antibiotics Rash    Tramadol Palpitations     heart races        SOCIAL HISTORY:       Social History     Socioeconomic History    Marital status:     Number of children: 10    Years of education: High School   Tobacco Use    Smoking status: Former     Current packs/day: 0.00     Average packs/day: 1.5 packs/day for 12.0 years (18.0 ttl pk-yrs)     Types: Cigarettes     Start date:      Quit date:      Years since quittin.8     Passive exposure: Past    Smokeless tobacco: Never    Tobacco comments:     QUIT SMOKING    Vaping Use    Vaping status: Never Used   Substance and Sexual Activity    Alcohol use: No     Comment: caffeine - decaf coffee    Drug use: Never    Sexual activity: Defer         FAMILY HISTORY:  Family History   Problem Relation Age of Onset    Diabetes Mother     Breast cancer Sister     Kidney cancer Sister     Heart disease Sister     Prostate cancer Brother     Prostate cancer Brother     Prostate cancer Brother     Malig Hyperthermia Neg Hx        REVIEW OF SYSTEMS:  As per HPI         Vitals:    10/18/24 1133   BP: 95/59   Pulse: 66   Resp: 18   Temp: 98.2 °F (36.8 °C)   TempSrc: Oral   SpO2: 100%   Weight: 77.7 kg (171 lb 4.8  "oz)   Height: 162.6 cm (64.02\")   PainSc: 0-No pain         10/18/2024    11:35 AM   Current Status   ECOG score 2      PHYSICAL EXAM:    CONSTITUTIONAL:  Vital signs reviewed.  No distress, looks comfortable.  EYES:  Conjunctiva and lids unremarkable.   EARS,NOSE,MOUTH,THROAT:  Ears and nose appear unremarkable.  Lips, teeth, gums appear unremarkable.  RESPIRATORY:  Normal respiratory effort.  Lungs clear to auscultation bilaterally.  CARDIOVASCULAR:  Normal S1, S2.  No murmurs rubs or gallops.  Bilateral 1-2+ leg edema  GASTROINTESTINAL: Abdomen appears unremarkable.  Not distended  LYMPHATIC:  No cervical, supraclavicular lymphadenopathy.  NEURO: AAOx3, no focal deficits.  Appears to have equal strength all 4 extremities.  MUSCULOSKELETAL:  Unremarkable digits/nails.  No cyanosis or clubbing.  No apparent joint deformities.  SKIN:  Warm.  No rashes.  PSYCHIATRIC:  Normal judgment and insight.  Normal mood and affect.     RECENT LABS:        Consult on 10/18/2024   Component Date Value Ref Range Status    Glucose 10/18/2024 95  65 - 99 mg/dL Final    BUN 10/18/2024 22  8 - 23 mg/dL Final    Creatinine 10/18/2024 1.14 (H)  0.57 - 1.00 mg/dL Final    Sodium 10/18/2024 135 (L)  136 - 145 mmol/L Final    Potassium 10/18/2024 4.8  3.5 - 5.2 mmol/L Final    Chloride 10/18/2024 101  98 - 107 mmol/L Final    CO2 10/18/2024 26.9  22.0 - 29.0 mmol/L Final    Calcium 10/18/2024 10.1  8.6 - 10.5 mg/dL Final    Total Protein 10/18/2024 6.9  6.0 - 8.5 g/dL Final    Albumin 10/18/2024 3.7  3.5 - 5.2 g/dL Final    ALT (SGPT) 10/18/2024 5  1 - 33 U/L Final    AST (SGOT) 10/18/2024 15  1 - 32 U/L Final    Alkaline Phosphatase 10/18/2024 61  39 - 117 U/L Final    Total Bilirubin 10/18/2024 1.4 (H)  0.0 - 1.2 mg/dL Final    Globulin 10/18/2024 3.2  gm/dL Final    A/G Ratio 10/18/2024 1.2  g/dL Final    BUN/Creatinine Ratio 10/18/2024 19.3  7.0 - 25.0 Final    Anion Gap 10/18/2024 7.1  5.0 - 15.0 mmol/L Final    eGFR 10/18/2024 46.1 " (L)  >60.0 mL/min/1.73 Final   Lab on 10/18/2024   Component Date Value Ref Range Status    WBC 10/18/2024 3.27 (L)  3.40 - 10.80 10*3/mm3 Final    RBC 10/18/2024 3.52 (L)  3.77 - 5.28 10*6/mm3 Final    Hemoglobin 10/18/2024 12.7  12.0 - 15.9 g/dL Final    Hematocrit 10/18/2024 37.8  34.0 - 46.6 % Final    MCV 10/18/2024 107.4 (H)  79.0 - 97.0 fL Final    MCH 10/18/2024 36.1 (H)  26.6 - 33.0 pg Final    MCHC 10/18/2024 33.6  31.5 - 35.7 g/dL Final    RDW 10/18/2024 14.9  12.3 - 15.4 % Final    RDW-SD 10/18/2024 59.7 (H)  37.0 - 54.0 fl Final    MPV 10/18/2024 9.3  6.0 - 12.0 fL Final    Platelets 10/18/2024 128 (L)  140 - 450 10*3/mm3 Final    Neutrophil % 10/18/2024 44.7  42.7 - 76.0 % Final    Lymphocyte % 10/18/2024 42.8  19.6 - 45.3 % Final    Monocyte % 10/18/2024 11.3  5.0 - 12.0 % Final    Eosinophil % 10/18/2024 0.3  0.3 - 6.2 % Final    Basophil % 10/18/2024 0.3  0.0 - 1.5 % Final    Immature Grans % 10/18/2024 0.6 (H)  0.0 - 0.5 % Final    Neutrophils, Absolute 10/18/2024 1.46 (L)  1.70 - 7.00 10*3/mm3 Final    Lymphocytes, Absolute 10/18/2024 1.40  0.70 - 3.10 10*3/mm3 Final    Monocytes, Absolute 10/18/2024 0.37  0.10 - 0.90 10*3/mm3 Final    Eosinophils, Absolute 10/18/2024 0.01  0.00 - 0.40 10*3/mm3 Final    Basophils, Absolute 10/18/2024 0.01  0.00 - 0.20 10*3/mm3 Final    Immature Grans, Absolute 10/18/2024 0.02  0.00 - 0.05 10*3/mm3 Final    nRBC 10/18/2024 0.0  0.0 - 0.2 /100 WBC Final     ASSESSMENT:  Patient is a pleasant 89-year-old -American female with medical history significant for CHF, ulcerative colitis, A-fib s/p pacemaker and pulmonary hypertension comes for MGUS evaluation and management.    # MGUS, IgA lambda:  Patient was seen by Dr. Win, cardiologist in May 2024 for CHF evaluation.  Given concern for ATTR amyloidosis, SPEP/DMITRIY and UPEP/DMITRIY were performed.   The SPEP/DMITRIY showed a small quantity of monoclonal protein, unable to quantitate the M spike.  On immunofixation,  this was an IgA lambda monoclonal protein.   Free light chains elevated (Kappa 42.7, lambda 44.6), however normal FLC ratio.    24-hour UPEP/DMITRIY did not show any M spike.  Patient has mild chronic leukopenia-attributed to ulcerative colitis/autoimmune condition vs benign ethnic neutropenia.  Mild chronic thrombocytopenia (120,000 range)-stable.  No hypercalcemia.  Stable chronic kidney disease.  No hypercalcemia.  Stable chronic kidney disease, baseline creatinine 1.1-1.2.  Informed patient that she appears to have IgA lambda MGUS, very low quantity.  At this level of monoclonal gammopathy, it would be highly unlikely to cause light chain amyloidosis and cardiomyopathy.  Discussed plan to repeat SPEP/DMITRIY and FLC's today.  If stable, repeat again in 6 months time.  Advised to maintain close follow-up with cardiology.    # CHF, cardiomyopathy:  Low suspicion for light chain amyloidosis.   Consider PYP scan if high suspicion for cardiac ATTR amyloidosis  Management per cardiology    # Chronic leukopenia and thrombocytopenia:  Stable.  Suspect underlying autoimmune (UC, ALEC+) disease related.  Monitor for now    PLAN:   -IgA lambda MGUS.  Low-intermediate risk.  Continue active surveillance  -Low suspicion for light chain amyloidosis.  -Repeat SPEP/DMITRIY and FLC's today  -Advised to maintain close follow-up with cardiology and other specialists  -If stable, repeat labs again in 6 months.    Orders Placed This Encounter   Procedures    Comprehensive Metabolic Panel     Order Specific Question:   Release to patient     Answer:   Routine Release [[id number]]    Immunofixation (DMITRIY), Protein Electrophoresis (PE), and Quantitative Free Kappa and Lambda Light Chains (FLC), Serum     Order Specific Question:   Release to patient     Answer:   Routine Release [[id number]]    Immunofixation (DMITRIY), Protein Electrophoresis (PE), and Quantitative Free Kappa and Lambda Light Chains (FLC), Serum     Standing Status:   Future      Standing Expiration Date:   10/18/2025     Order Specific Question:   Release to patient     Answer:   Routine Release [[id number]]    Comprehensive Metabolic Panel     Standing Status:   Future     Standing Expiration Date:   10/18/2025     Order Specific Question:   Release to patient     Answer:   Routine Release [[id number]]    CBC & Differential     Standing Status:   Future     Standing Expiration Date:   10/18/2025     Order Specific Question:   Manual Differential     Answer:   No     Order Specific Question:   Release to patient     Answer:   Routine Release [[id number]]   Total time spent during this patient encounter is 65 minutes. The total time spent with the patient includes: preparing to see the patient by reviewing of tests, prior notes or other relevant information, performing appropriate independent examination & evaluation, counseling, ordering of medications, tests or procedures, documenting clinic information in the electronic medical records or other health records, independently interpreting results of tests and communicating the results to the patient/family or caregiver.

## 2024-10-21 ENCOUNTER — PATIENT ROUNDING (BHMG ONLY) (OUTPATIENT)
Dept: ONCOLOGY | Facility: CLINIC | Age: 89
End: 2024-10-21
Payer: MEDICARE

## 2024-10-21 LAB
ALBUMIN SERPL ELPH-MCNC: 3.4 G/DL (ref 2.9–4.4)
ALBUMIN/GLOB SERPL: 1.1 {RATIO} (ref 0.7–1.7)
ALPHA1 GLOB SERPL ELPH-MCNC: 0.3 G/DL (ref 0–0.4)
ALPHA2 GLOB SERPL ELPH-MCNC: 0.6 G/DL (ref 0.4–1)
B-GLOBULIN SERPL ELPH-MCNC: 1.2 G/DL (ref 0.7–1.3)
GAMMA GLOB SERPL ELPH-MCNC: 1.2 G/DL (ref 0.4–1.8)
GLOBULIN SER-MCNC: 3.2 G/DL (ref 2.2–3.9)
IGA SERPL-MCNC: 628 MG/DL (ref 64–422)
IGG SERPL-MCNC: 1245 MG/DL (ref 586–1602)
IGM SERPL-MCNC: 57 MG/DL (ref 26–217)
INTERPRETATION SERPL IEP-IMP: ABNORMAL
KAPPA LC FREE SER-MCNC: 51.6 MG/L (ref 3.3–19.4)
KAPPA LC FREE/LAMBDA FREE SER: 1.27 {RATIO} (ref 0.26–1.65)
LABORATORY COMMENT REPORT: ABNORMAL
LAMBDA LC FREE SERPL-MCNC: 40.7 MG/L (ref 5.7–26.3)
M PROTEIN SERPL ELPH-MCNC: ABNORMAL G/DL
PROT SERPL-MCNC: 6.6 G/DL (ref 6–8.5)

## 2024-10-22 ENCOUNTER — TELEPHONE (OUTPATIENT)
Dept: FAMILY MEDICINE CLINIC | Facility: CLINIC | Age: 89
End: 2024-10-22

## 2024-10-22 DIAGNOSIS — L89.159 PRESSURE INJURY OF SKIN OF SACRAL REGION, UNSPECIFIED INJURY STAGE: Primary | ICD-10-CM

## 2024-10-28 NOTE — TELEPHONE ENCOUNTER
Thank you Milady a little tough to tell if this was a telehealth visit but now agree with keeping Lasix the same.     Whit, can you let the patient know to stay on the same dose of Lasix unless she has more shortness of breath or edema or weight gain.   well developed, well nourished , in no acute distress , ambulating without difficulty , normal communication ability

## 2024-10-30 ENCOUNTER — OFFICE VISIT (OUTPATIENT)
Dept: WOUND CARE | Facility: HOSPITAL | Age: 89
End: 2024-10-30
Payer: MEDICARE

## 2024-10-30 PROCEDURE — G0463 HOSPITAL OUTPT CLINIC VISIT: HCPCS

## 2024-10-30 PROCEDURE — 97602 WOUND(S) CARE NON-SELECTIVE: CPT

## 2024-11-25 RX ORDER — LIDOCAINE 50 MG/G
OINTMENT TOPICAL
Qty: 35.44 G | Refills: 5 | Status: SHIPPED | OUTPATIENT
Start: 2024-11-25

## 2024-11-27 RX ORDER — FUROSEMIDE 40 MG/1
20 TABLET ORAL DAILY
Qty: 90 TABLET | Refills: 1 | Status: SHIPPED | OUTPATIENT
Start: 2024-11-27

## 2024-11-27 NOTE — TELEPHONE ENCOUNTER
Rx Refill Note  Requested Prescriptions     Pending Prescriptions Disp Refills    furosemide (LASIX) 40 MG tablet 90 tablet 1     Sig: Take 0.5 tablets by mouth Daily. Taking 20mg      Last office visit with prescribing clinician: 9/11/2024   Last telemedicine visit with prescribing clinician: Visit date not found   Next office visit with prescribing clinician: 3/17/2025                         Would you like a call back once the refill request has been completed: [] Yes [] No    If the office needs to give you a call back, can they leave a voicemail: [] Yes [] No    Gordon Monroy  11/27/24, 11:02 EST

## 2024-12-04 ENCOUNTER — HOSPITAL ENCOUNTER (OUTPATIENT)
Dept: CARDIOLOGY | Facility: HOSPITAL | Age: 89
Discharge: HOME | End: 2024-12-04
Payer: MEDICARE

## 2024-12-04 ENCOUNTER — LAB (OUTPATIENT)
Dept: LAB | Facility: HOSPITAL | Age: 89
End: 2024-12-04
Payer: MEDICARE

## 2024-12-04 VITALS
WEIGHT: 162.4 LBS | DIASTOLIC BLOOD PRESSURE: 59 MMHG | SYSTOLIC BLOOD PRESSURE: 94 MMHG | HEIGHT: 64 IN | BODY MASS INDEX: 27.72 KG/M2 | OXYGEN SATURATION: 99 % | HEART RATE: 60 BPM

## 2024-12-04 DIAGNOSIS — I50.32 CHRONIC HEART FAILURE WITH PRESERVED EJECTION FRACTION: ICD-10-CM

## 2024-12-04 DIAGNOSIS — I51.9 DYSFUNCTION OF RIGHT CARDIAC VENTRICLE: ICD-10-CM

## 2024-12-04 DIAGNOSIS — I27.20 PULMONARY HYPERTENSION: Primary | ICD-10-CM

## 2024-12-04 LAB
ANION GAP SERPL CALCULATED.3IONS-SCNC: 8.8 MMOL/L (ref 5–15)
BUN SERPL-MCNC: 23 MG/DL (ref 8–23)
BUN/CREAT SERPL: 19.2 (ref 7–25)
CALCIUM SPEC-SCNC: 10.5 MG/DL (ref 8.6–10.5)
CHLORIDE SERPL-SCNC: 100 MMOL/L (ref 98–107)
CO2 SERPL-SCNC: 27.2 MMOL/L (ref 22–29)
CREAT SERPL-MCNC: 1.2 MG/DL (ref 0.57–1)
EGFRCR SERPLBLD CKD-EPI 2021: 43.4 ML/MIN/1.73
GLUCOSE SERPL-MCNC: 87 MG/DL (ref 65–99)
POTASSIUM SERPL-SCNC: 4.8 MMOL/L (ref 3.5–5.2)
SODIUM SERPL-SCNC: 136 MMOL/L (ref 136–145)

## 2024-12-04 PROCEDURE — 36415 COLL VENOUS BLD VENIPUNCTURE: CPT

## 2024-12-04 PROCEDURE — 80048 BASIC METABOLIC PNL TOTAL CA: CPT

## 2024-12-04 PROCEDURE — G0463 HOSPITAL OUTPT CLINIC VISIT: HCPCS

## 2024-12-04 NOTE — PATIENT INSTRUCTIONS
BMP today-we will call with results.     Monitor BP at home. Please call for any low Bps less than 90/60 or if she is symptomatic with dizziness or lightheadedness

## 2024-12-04 NOTE — LETTER
December 10, 2024     Mary Grace Culp MD  [address] [address]    Patient: Brittany Villeda   YOB: 1935   Date of Visit: 12/4/2024     Dear Mary Grace Culp MD:       Thank you for referring Brittany Villeda to me for evaluation. Below are the relevant portions of my assessment and plan of care.    If you have questions, please do not hesitate to call me. I look forward to following Brittany along with you.         Sincerely,        SHAHID Bazan        CC: MD Obed Calvert, SHAHID Holly  12/10/24 2258  Signed    [facility]    Franklin Galvin MD  [address] [id number]    Thank you for asking me to see Brittany Villeda.     Congestive Heart Failure  Pertinent negatives include no chest pain or shortness of breath.       1.  Pulmonary hypertension  2.  HFpEF    Subjective     Brittany Cliftons a 89 y.o. female who presents today for pulmonary hypertension, HFpEF.   The patient's most recent 2D TTE showed evidence of pulmonary hypertension and right-sided involvement.  She does have biatrial dilatation.  She was diagnosed with HUGO. She does not use a CPAP. She was diagnosed with complete heart block in the past and required placement of a PPM.  She also was diagnosed with COPD.  She does not routinely follow with pulmonary medicine.  She also was diagnosed with MGUS.    She does have follow-up scheduled now with heme-onc in August.  The patient continues to report exercise intolerance.  Reports chronic bilateral lower extremity edema that is unchanged.  Denies any abdominal distention.    She does report exertional dyspnea/shortness of breath.   Denies any PND.     Review of Systems - Review of Systems   Constitutional: Negative for weight gain and weight loss.   Cardiovascular:  Negative for chest pain, dyspnea on exertion, leg swelling, orthopnea, paroxysmal nocturnal dyspnea and  syncope.   Respiratory:  Negative for cough, shortness of breath, sleep disturbances due to breathing and snoring.    Gastrointestinal:  Negative for bloating.   Neurological:  Negative for dizziness, light-headedness and weakness.          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-sided sciatica   • Essential hypertension   • Gastroesophageal reflux disease without esophagitis   • Cataract   • Pulmonary hypertension   • Diastolic dysfunction   • HUGO (obstructive sleep apnea)   • Trifascicular block   • MGUS (monoclonal gammopathy of unknown significance)   • Ulcerative rectosigmoiditis without complication   • Lichen sclerosus   • Heart block   • Sleep-related hypoxia   • Bradycardia   • History of atrial fibrillation   • Pacemaker   • Paroxysmal SVT (supraventricular tachycardia)   • History of chest pain   • Palpitations   • Paroxysmal atrial fibrillation   • Ascending aortic aneurysm   • Mediastinal lymphadenopathy   • Anemia   • Obesity (BMI 30-39.9)   • Generalized weakness   • Dysautonomia   • Hypercalcemia   • Hyperparathyroidism   • Choledocholithiasis   • Duodenal ulcer   • Hemorrhagic gastritis   • Exertional dyspnea   • COPD (chronic obstructive pulmonary disease)   • Osteoarthritis of glenohumeral joint   • ICH (intracerebral hemorrhage)   • Lower GI bleed   • Thrombocytopenia   • Lichen sclerosus of female genitalia   • Senile atrophic vaginitis   • Left inguinal hernia   • Vulvar pain   • Bilateral lower extremity edema   • Chronic heart failure with preserved ejection fraction   • Acute renal insufficiency   • Dysfunction of right cardiac ventricle     family history includes Breast cancer in her sister; Diabetes in her mother; Heart disease in her sister; Kidney cancer in her sister; Prostate cancer in her brother, brother, and brother.   reports that she quit smoking about 59 years ago. Her smoking use included cigarettes.  She started smoking about 71 years ago. She has a 18 pack-year smoking history. She has been exposed to tobacco smoke. She has never used smokeless tobacco. She reports that she does not drink alcohol and does not use drugs.  Allergies   Allergen Reactions   • Codeine Hallucinations     Tolerates hydromorphone   • Amitriptyline Rash   • Amoxicillin-Pot Clavulanate Rash   • Aspirin Unknown - Low Severity     Patient doesn't know why   • Carisoprodol-Aspirin-Codeine Palpitations   • Iodinated Contrast Media Rash   • Latex Rash   • Naproxen Rash   • Nsaids Unknown - Low Severity     UNSURE OF REACTION   • Soma Compound With Codeine [Carisoprodol-Aspirin-Codeine] Rash   • Sulfa Antibiotics Rash   • Tramadol Palpitations     heart races      Physical Activity: Sufficiently Active (4/26/2024)    Exercise Vital Sign    • Days of Exercise per Week: 5 days    • Minutes of Exercise per Session: 30 min          Current Outpatient Medications:   •  acetaminophen (TYLENOL) 500 MG tablet, Take 1 tablet by mouth Every 6 (Six) Hours As Needed for Mild Pain ., Disp: , Rfl:   •  docusate sodium (COLACE) 100 MG capsule, Take 1 capsule by mouth 2 (Two) Times a Day As Needed for Constipation., Disp: 60 capsule, Rfl: 5  •  ELDERBERRY PO, Take 2 tablets by mouth Daily., Disp: , Rfl:   •  furosemide (LASIX) 40 MG tablet, Take 0.5 tablets by mouth Daily. Taking 20mg, Disp: 90 tablet, Rfl: 1  •  lidocaine (XYLOCAINE) 5 % ointment, APPLY 1 A THIN LAYER TOPICALLY DAILY AS DIRECTED, Disp: 35.44 g, Rfl: 5  •  Lidocaine 50mg suppository, Insert 1 suppository into the rectum Daily As Needed (rectal pain)., Disp: 14 suppository, Rfl: 0  •  Magnesium Oxide -Mg Supplement 400 (240 Mg) MG tablet, Take 1 tablet by mouth Daily., Disp: 90 tablet, Rfl: 1  •  mesalamine (APRISO) 0.375 g 24 hr capsule, TAKE FOUR CAPSULES BY MOUTH DAILY, Disp: 360 capsule, Rfl: 2  •  mesalamine (CANASA) 1000 MG suppository, UNWRAP AND INSERT 1 SUPPOSITORY RECTALLY EVERY  NIGHT AT BEDTIME, Disp: 30 suppository, Rfl: 0  •  pantoprazole (PROTONIX) 40 MG EC tablet, Take 1 tablet by mouth Daily., Disp: 90 tablet, Rfl: 1  •  spironolactone (ALDACTONE) 25 MG tablet, Take 0.5 tablets by mouth Daily., Disp: , Rfl:   •  valACYclovir (VALTREX) 1000 MG tablet, Take 1 tablet by mouth Daily., Disp: , Rfl:   •  vitamin B-12 (CYANOCOBALAMIN) 1000 MCG tablet, Take 1 tablet by mouth Daily., Disp: 90 tablet, Rfl: 1    Objective  Vital Sign Review:   Vitals:    12/04/24 1139   BP: 94/59   Pulse: 60   SpO2: 99%     Body mass index is 27.86 kg/m².      12/04/24  1139   Weight: 73.7 kg (162 lb 6.4 oz)       Physical Exam:  Constitutional:       Appearance: Normal and healthy appearance.   Neck:      Vascular: No carotid bruit or JVD. JVD normal.   Pulmonary:      Effort: Pulmonary effort is normal.      Breath sounds: Normal breath sounds.   Cardiovascular:      PMI at left midclavicular line. Normal rate. Regular rhythm.   Pulses:     Intact distal pulses.   Edema:     Peripheral edema absent.   Abdominal:      Palpations: Abdomen is soft.      Tenderness: There is no abdominal tenderness.   Skin:     General: Skin is warm and dry.   Neurological:      General: No focal deficit present.      Mental Status: Alert and oriented to person, place and time.   Psychiatric:         Behavior: Behavior is cooperative.          DATA REVIEWED:   EKG:  ECG 12 Lead     Date/Time: 8/12/2024 1:25 PM  Performed by: Sydney Gilliam APRN     Authorized by: Sydney Gilliam APRN  Comparison: compared with previous ECG   Similar to previous ECG  Rhythm: atrial fibrillation and paced  Rate: normal  BPM: 67  Conduction: right bundle branch block  QRS axis: normal  Comments: Similar to prior    ---------------------------------------------------  ECHO:    Results for orders placed during the hospital encounter of 04/23/24    Adult Transthoracic Echo Complete W/ Cont if Necessary Per Protocol    Interpretation Summary  •   Left ventricular systolic function is normal. Left ventricular ejection fraction appears to be 61 - 65%.  •  Left ventricular diastolic function was indeterminate.  •  The right ventricular cavity is mildly dilated. Normal right ventricular systolic function noted.  •  The left atrial cavity is severely dilated.  •  The right atrial cavity is severely dilated.  •  Saline test results are negative.  •  Mild aortic valve regurgitation is present.  •  Mild mitral valve regurgitation is present.  •  Mild to moderate tricuspid valve regurgitation is present.  •  Calculated right ventricular systolic pressure from tricuspid regurgitation is 38 mmHg.  •  There is no evidence of pericardial effusion.          -----------------------------------------------------  RHC/LHC    No results found for this or any previous visit.      ---------------------------------------------------------------------------    CT:   CTA chest  FINDINGS:  1. There is no convincing evidence for pulmonary thromboemboli. Again  seen is heterogeneous admixture of contrast of the pulmonary arteries at  the right lower lobe, but no pulmonary thromboemboli are seen. Pulmonary  arteries are enlarged with the main pulmonary artery measuring 3.8 cm in  diameter, pulmonary arterial hypertension.     The thoracic aorta is ectatic at 4.0 cm, stable when measured along the  same planes. The diameter tapers to 3 cm at the aortic arch.     2. Stable marked elevation of the right hemidiaphragm with significant  compressive atelectasis at the right lower lobe. No new airspace  consolidations are seen suspicious for pneumonia. There are no pleural  or pericardial effusions. There is cardiomegaly and findings of  right-sided cardiac insufficiency, unchanged.     3. Enlarged mediastinal nodes are stable and extensive multinodular  goiter appears stable, although not seen in its entirety. No new  abnormality seen at the visualized upper abdomen, multiple renal  cysts  are noted.      --------------------------------------------------------------------------------------------------------------    Laboratory evaluations:  Renal Function: CrCl cannot be calculated (Patient's most recent lab result is older than the maximum 30 days allowed.).    Lab Results   Component Value Date    GLUCOSE 95 10/18/2024    BUN 22 10/18/2024    CREATININE 1.14 (H) 10/18/2024    EGFRIFNONA 77 07/29/2021    EGFRIFAFRI 115 01/24/2022    BCR 19.3 10/18/2024    K 4.8 10/18/2024    CO2 26.9 10/18/2024    CALCIUM 10.1 10/18/2024    PROTENTOTREF 6.6 10/18/2024    ALBUMIN 3.7 10/18/2024    ALBUMIN 3.4 10/18/2024    LABIL2 1.1 10/18/2024    AST 15 10/18/2024    ALT 5 10/18/2024     Lab Results   Component Value Date    WBC 3.27 (L) 10/18/2024    HGB 12.7 10/18/2024    HCT 37.8 10/18/2024    .4 (H) 10/18/2024     (L) 10/18/2024     Lab Results   Component Value Date    HGBA1C 5.4 04/25/2022     Lab Results   Component Value Date    HGBA1C 5.4 04/25/2022    HGBA1C 4.90 06/23/2021    HGBA1C 5.30 05/11/2021     Lab Results   Component Value Date    CREATININE 1.14 (H) 10/18/2024     Lab Results   Component Value Date    IRON 71 01/03/2020    TIBC 372 01/03/2020    FERRITIN 65.00 09/29/2022       Result Review:  I have personally reviewed the results from the time of this admission to 12/4/2024 12:16 EST and agree with these findings:  [x]  Laboratory list / accordion  []  Microbiology  [x]  Radiology  [x]  EKG/Telemetry   [x]  Cardiology/Vascular   []  Pathology  [x]  Old records  []  Other:  Most notable findings include:   6/2024 device interrogation:   Presenting: AF/@ 60 BPM Battery: 11 months Sensing, impedance, and thresholds reviewed: WNL Events: None since last remote, ongoing persistent AF known to patient OAC contraindicated d/t GI bleeds. Heart rate histograms reviewed Pacing and detection parameters reviewed Planned follow-up: 10/10/24 device check and provider appt. Remote  monitoring: Every 91 days and as needed.             ReDS VOLUMETRIC ASSESSMENT:  UTO      Assessment & Plan     1. Pulmonary hypertension    2. Chronic heart failure with preserved ejection fraction    3. Dysfunction of right cardiac ventricle          Pulmonary hypertension-she has HFpEF and HUGO. we will focus on GDMT for heart failure.  No current indications today for the initiation of PAH-specific medications.  However, if she has any further interval negative remodeling of her RV. May consider VQ scan in the future to exclude CTEPH.  PFTs showed Mild restrictive lung disease with a moderate diffusion deficit.     HFpEF.  Today, euvolemic.  BMP checked today, stable renal function  Tentative plan to repeat echo in April  F/u in 3 month  BP soft today MAP 71 mm Hg. I will have her monitor her BP at home. We may need to stop spironolactone if continues to be low--instructions given to call for any low BPs  GDMT listed below    3. Right ventricular dysfunction--would like to keep patient on spironolactone 12.5mg daily.       NYHA stage C FC-III     Clinical status was assessed and has remained stable.  Treatment intent: curative   ReDS reading was not obtained today.  ReDS result: UTO       Today, Patient appears euvolemic. and with perfusion. The patient's hemodynamics are currently acceptable. HR is: normal and is at goal. BP/MAP was reviewed and there is not room for medication up-titration.  The patient's clinical course has been impacted by Arrythmia-persistent afib, untreated HUGO. LVEF: 61-65%.     GDMT Assessment:     GDMT changes recommended today: No changes to medication therapy.      BB:   Deferred, history of heart block and bradycardia  Monitor heart rate.  Please call the [facility] for HR<50, dizziness, lightheadedness, syncope     A/A/A:   Hemodynamics do not allow at this time.        [id number]:  Not a candidate due to having severe vaginal yeast infections.       MRA:  The patient is FC-NYHA Class III and  MRA is indicated.   decrease Spironolactone to 12.5mg daily          Diuretic Regimen:  -DIURETIC:   furosemide (LASIX) 20 mg every day  -Fluid restriction:   -requested  -2000 ml  -Patient has been asked to weigh daily and was provided with a printed diuretic strategy.  -Sodium restriction:   -1,500 mg Na restriction was discussed.      Labs/Diagnostics/Referrals:    Labs -Chem-7       Lifestyle Advice:   - call office if I gain more than 2 pounds in one day or 5 pounds in one week   - keep legs up while sitting   - use salt in moderation   - watch for swelling in feet, ankles and legs every day   - weigh myself daily   -call for concerning s/sx   -- activity or exercise based on tolerance encouraged     CODE STATUS: FULL              Return in about 3 months (around 3/4/2025).              Thank you for allowing me to participate in the care of your patient,         SHAHID Bazan  [facility]  12/04/24  09:12 EDT      **Deon Disclaimer:**  Much of this encounter note is an electronic transcription/translation of spoken language to printed text. The electronic translation of spoken language may permit erroneous, or at times, nonsensical words or phrases to be inadvertently transcribed. Although I have reviewed the note for such errors, some may still exist.    The information in this note was reviewed and updated during the visit to be as accurate as possible. As many patients have chronic medical problems, many of their individual problems and ongoing plans do not change significantly from visit to visit.  This information is correct and is consistent with my treatment plan as of today's visit.

## 2024-12-04 NOTE — PROGRESS NOTES
[facility]    Franklin Galvin MD  [address]  [address]  [place] [address]    Thank you for asking me to see Brittany Villeda.     Congestive Heart Failure  Pertinent negatives include no chest pain or shortness of breath.       1.  Pulmonary hypertension  2.  HFpEF    Subjective      Brittany Damian a 89 y.o. female who presents today for pulmonary hypertension, HFpEF.   The patient's most recent 2D TTE showed evidence of pulmonary hypertension and right-sided involvement.  She does have biatrial dilatation.  She was diagnosed with HUGO. She does not use a CPAP. She was diagnosed with complete heart block in the past and required placement of a PPM.  She also was diagnosed with COPD.  She does not routinely follow with pulmonary medicine.  She also was diagnosed with MGUS.    She does have follow-up scheduled now with heme-onc in August.  The patient continues to report exercise intolerance.  Reports chronic bilateral lower extremity edema that is unchanged.  Denies any abdominal distention.    She does report exertional dyspnea/shortness of breath.   Denies any PND.     Review of Systems - Review of Systems   Constitutional: Negative for weight gain and weight loss.   Cardiovascular:  Negative for chest pain, dyspnea on exertion, leg swelling, orthopnea, paroxysmal nocturnal dyspnea and syncope.   Respiratory:  Negative for cough, shortness of breath, sleep disturbances due to breathing and snoring.    Gastrointestinal:  Negative for bloating.   Neurological:  Negative for dizziness, light-headedness and weakness.          Patient Active Problem List   Diagnosis    Non-toxic multinodular goiter    Chronic midline low back pain with right-sided sciatica    Essential hypertension    Gastroesophageal reflux disease without esophagitis    Cataract    Pulmonary hypertension    Diastolic dysfunction    HUGO (obstructive sleep apnea)    Trifascicular block    MGUS  (monoclonal gammopathy of unknown significance)    Ulcerative rectosigmoiditis without complication    Lichen sclerosus    Heart block    Sleep-related hypoxia    Bradycardia    History of atrial fibrillation    Pacemaker    Paroxysmal SVT (supraventricular tachycardia)    History of chest pain    Palpitations    Paroxysmal atrial fibrillation    Ascending aortic aneurysm    Mediastinal lymphadenopathy    Anemia    Obesity (BMI 30-39.9)    Generalized weakness    Dysautonomia    Hypercalcemia    Hyperparathyroidism    Choledocholithiasis    Duodenal ulcer    Hemorrhagic gastritis    Exertional dyspnea    COPD (chronic obstructive pulmonary disease)    Osteoarthritis of glenohumeral joint    ICH (intracerebral hemorrhage)    Lower GI bleed    Thrombocytopenia    Lichen sclerosus of female genitalia    Senile atrophic vaginitis    Left inguinal hernia    Vulvar pain    Bilateral lower extremity edema    Chronic heart failure with preserved ejection fraction    Acute renal insufficiency    Dysfunction of right cardiac ventricle     family history includes Breast cancer in her sister; Diabetes in her mother; Heart disease in her sister; Kidney cancer in her sister; Prostate cancer in her brother, brother, and brother.   reports that she quit smoking about 59 years ago. Her smoking use included cigarettes. She started smoking about 71 years ago. She has a 18 pack-year smoking history. She has been exposed to tobacco smoke. She has never used smokeless tobacco. She reports that she does not drink alcohol and does not use drugs.  Allergies   Allergen Reactions    Codeine Hallucinations     Tolerates hydromorphone    Amitriptyline Rash    Amoxicillin-Pot Clavulanate Rash    Aspirin Unknown - Low Severity     Patient doesn't know why    Carisoprodol-Aspirin-Codeine Palpitations    Iodinated Contrast Media Rash    Latex Rash    Naproxen Rash    Nsaids Unknown - Low Severity     UNSURE OF REACTION    Soma Compound With Codeine  [Carisoprodol-Aspirin-Codeine] Rash    Sulfa Antibiotics Rash    Tramadol Palpitations     heart races      Physical Activity: Sufficiently Active (4/26/2024)    Exercise Vital Sign     Days of Exercise per Week: 5 days     Minutes of Exercise per Session: 30 min          Current Outpatient Medications:     acetaminophen (TYLENOL) 500 MG tablet, Take 1 tablet by mouth Every 6 (Six) Hours As Needed for Mild Pain ., Disp: , Rfl:     docusate sodium (COLACE) 100 MG capsule, Take 1 capsule by mouth 2 (Two) Times a Day As Needed for Constipation., Disp: 60 capsule, Rfl: 5    ELDERBERRY PO, Take 2 tablets by mouth Daily., Disp: , Rfl:     furosemide (LASIX) 40 MG tablet, Take 0.5 tablets by mouth Daily. Taking 20mg, Disp: 90 tablet, Rfl: 1    lidocaine (XYLOCAINE) 5 % ointment, APPLY 1 A THIN LAYER TOPICALLY DAILY AS DIRECTED, Disp: 35.44 g, Rfl: 5    Lidocaine 50mg suppository, Insert 1 suppository into the rectum Daily As Needed (rectal pain)., Disp: 14 suppository, Rfl: 0    Magnesium Oxide -Mg Supplement 400 (240 Mg) MG tablet, Take 1 tablet by mouth Daily., Disp: 90 tablet, Rfl: 1    mesalamine (APRISO) 0.375 g 24 hr capsule, TAKE FOUR CAPSULES BY MOUTH DAILY, Disp: 360 capsule, Rfl: 2    mesalamine (CANASA) 1000 MG suppository, UNWRAP AND INSERT 1 SUPPOSITORY RECTALLY EVERY NIGHT AT BEDTIME, Disp: 30 suppository, Rfl: 0    pantoprazole (PROTONIX) 40 MG EC tablet, Take 1 tablet by mouth Daily., Disp: 90 tablet, Rfl: 1    spironolactone (ALDACTONE) 25 MG tablet, Take 0.5 tablets by mouth Daily., Disp: , Rfl:     valACYclovir (VALTREX) 1000 MG tablet, Take 1 tablet by mouth Daily., Disp: , Rfl:     vitamin B-12 (CYANOCOBALAMIN) 1000 MCG tablet, Take 1 tablet by mouth Daily., Disp: 90 tablet, Rfl: 1    Objective   Vital Sign Review:   Vitals:    12/04/24 1139   BP: 94/59   Pulse: 60   SpO2: 99%     Body mass index is 27.86 kg/m².      12/04/24  1139   Weight: 73.7 kg (162 lb 6.4 oz)       Physical Exam:  Constitutional:        Appearance: Normal and healthy appearance.   Neck:      Vascular: No carotid bruit or JVD. JVD normal.   Pulmonary:      Effort: Pulmonary effort is normal.      Breath sounds: Normal breath sounds.   Cardiovascular:      PMI at left midclavicular line. Normal rate. Regular rhythm.   Pulses:     Intact distal pulses.   Edema:     Peripheral edema absent.   Abdominal:      Palpations: Abdomen is soft.      Tenderness: There is no abdominal tenderness.   Skin:     General: Skin is warm and dry.   Neurological:      General: No focal deficit present.      Mental Status: Alert and oriented to person, place and time.   Psychiatric:         Behavior: Behavior is cooperative.          DATA REVIEWED:   EKG:  ECG 12 Lead     Date/Time: 8/12/2024 1:25 PM  Performed by: Sydney Gilliam APRN     Authorized by: Sydney Gilliam APRN  Comparison: compared with previous ECG   Similar to previous ECG  Rhythm: atrial fibrillation and paced  Rate: normal  BPM: 67  Conduction: right bundle branch block  QRS axis: normal  Comments: Similar to prior    ---------------------------------------------------  ECHO:    Results for orders placed during the hospital encounter of 04/23/24    Adult Transthoracic Echo Complete W/ Cont if Necessary Per Protocol    Interpretation Summary    Left ventricular systolic function is normal. Left ventricular ejection fraction appears to be 61 - 65%.    Left ventricular diastolic function was indeterminate.    The right ventricular cavity is mildly dilated. Normal right ventricular systolic function noted.    The left atrial cavity is severely dilated.    The right atrial cavity is severely dilated.    Saline test results are negative.    Mild aortic valve regurgitation is present.    Mild mitral valve regurgitation is present.    Mild to moderate tricuspid valve regurgitation is present.    Calculated right ventricular systolic pressure from tricuspid regurgitation is 38 mmHg.    There is no  evidence of pericardial effusion.          -----------------------------------------------------  RHC/LHC    No results found for this or any previous visit.      ---------------------------------------------------------------------------    CT:   CTA chest  FINDINGS:  1. There is no convincing evidence for pulmonary thromboemboli. Again  seen is heterogeneous admixture of contrast of the pulmonary arteries at  the right lower lobe, but no pulmonary thromboemboli are seen. Pulmonary  arteries are enlarged with the main pulmonary artery measuring 3.8 cm in  diameter, pulmonary arterial hypertension.     The thoracic aorta is ectatic at 4.0 cm, stable when measured along the  same planes. The diameter tapers to 3 cm at the aortic arch.     2. Stable marked elevation of the right hemidiaphragm with significant  compressive atelectasis at the right lower lobe. No new airspace  consolidations are seen suspicious for pneumonia. There are no pleural  or pericardial effusions. There is cardiomegaly and findings of  right-sided cardiac insufficiency, unchanged.     3. Enlarged mediastinal nodes are stable and extensive multinodular  goiter appears stable, although not seen in its entirety. No new  abnormality seen at the visualized upper abdomen, multiple renal cysts  are noted.      --------------------------------------------------------------------------------------------------------------    Laboratory evaluations:  Renal Function: CrCl cannot be calculated (Patient's most recent lab result is older than the maximum 30 days allowed.).    Lab Results   Component Value Date    GLUCOSE 95 10/18/2024    BUN 22 10/18/2024    CREATININE 1.14 (H) 10/18/2024    EGFRIFNONA 77 07/29/2021    EGFRIFAFRI 115 01/24/2022    BCR 19.3 10/18/2024    K 4.8 10/18/2024    CO2 26.9 10/18/2024    CALCIUM 10.1 10/18/2024    PROTENTOTREF 6.6 10/18/2024    ALBUMIN 3.7 10/18/2024    ALBUMIN 3.4 10/18/2024    LABIL2 1.1 10/18/2024    AST 15  10/18/2024    ALT 5 10/18/2024     Lab Results   Component Value Date    WBC 3.27 (L) 10/18/2024    HGB 12.7 10/18/2024    HCT 37.8 10/18/2024    .4 (H) 10/18/2024     (L) 10/18/2024     Lab Results   Component Value Date    HGBA1C 5.4 04/25/2022     Lab Results   Component Value Date    HGBA1C 5.4 04/25/2022    HGBA1C 4.90 06/23/2021    HGBA1C 5.30 05/11/2021     Lab Results   Component Value Date    CREATININE 1.14 (H) 10/18/2024     Lab Results   Component Value Date    IRON 71 01/03/2020    TIBC 372 01/03/2020    FERRITIN 65.00 09/29/2022       Result Review:  I have personally reviewed the results from the time of this admission to 12/4/2024 12:16 EST and agree with these findings:  [x]  Laboratory list / accordion  []  Microbiology  [x]  Radiology  [x]  EKG/Telemetry   [x]  Cardiology/Vascular   []  Pathology  [x]  Old records  []  Other:  Most notable findings include:   6/2024 device interrogation:   Presenting: AF/@ 60 BPM Battery: 11 months Sensing, impedance, and thresholds reviewed: WNL Events: None since last remote, ongoing persistent AF known to patient OAC contraindicated d/t GI bleeds. Heart rate histograms reviewed Pacing and detection parameters reviewed Planned follow-up: 10/10/24 device check and provider appt. Remote monitoring: Every 91 days and as needed.             ReDS VOLUMETRIC ASSESSMENT:  Procedures      Assessment & Plan      1. Chronic heart failure with preserved ejection fraction          Pulmonary hypertension-she has HFpEF and HUGO. we will focus on GDMT for heart failure.  No current indications today for the initiation of PAH-specific medications.  However, if she has any further interval negative remodeling of her RV. May consider VQ scan in the future to exclude CTEPH. PFTs were re-ordered as they have not yet been done.     HFpEF.  Today, euvolemic.  BMP checked today, slight worsening renal function and potassium was elevated.  Patient was notified to stop  potassium and start taking half dose of the spironolactone.  We will recheck BMP in 1 week  GDMT listed below    3. Right ventricular dysfunction--would like to keep patient on spironolactone. Due to slight decline in renal function, spironolactone was decreased to 12.5mg daily.   Diastolic CHF.  Also followed by the heart failure clinic.  Doing quite well on the current regimen.  Minimal edema.  3.  Pulmonary hypertension.  Being addressed by Dr. Win with plans for VQ scan if she has further remodeling of the RV.  Pulmonary function test will be ordered/2024      Repeat echo in April  F/u in 3 month  BP soft today MAP 71 mm Hg. I will have her monitor her BP at home. We may need to stop spironolactone if continues to be low      NYHA stage C FC-III     Clinical status was assessed and has remained stable.  Treatment intent: curative   ReDS reading was obtained today.  ReDS result: 21       Today, Patient appears euvolemic. and with perfusion. The patient's hemodynamics are currently acceptable. HR is: normal and is at goal. BP/MAP was reviewed and there is not room for medication up-titration.  The patient's clinical course has been impacted by Arrythmia-persistent afib, untreated HUGO. LVEF: 61-65%.     GDMT Assessment:     GDMT changes recommended today:  decrease spironolactone      BB:   Deferred, history of heart block and bradycardia  Monitor heart rate.  Please call the [facility] for HR<50, dizziness, lightheadedness, syncope     A/A/A:   Hemodynamics do not allow at this time.  Blood pressure 103/56 today.       [id number]:  Not a candidate due to having severe vaginal yeast infections.       MRA:  The patient is FC-NYHA Class III and MRA is indicated.   decrease Spironolactone to 12.5mg daily          Diuretic Regimen:  -DIURETIC:   furosemide (LASIX) 20 mg every day  -Fluid restriction:   -requested  -2000 ml  -Patient has been asked to weigh daily and was provided with a printed diuretic strategy.  -Sodium  restriction:   -1,500 mg Na restriction was discussed.      Labs/Diagnostics/Referrals:    Labs -Chem-7   Currently Pending: PFTs       Lifestyle Advice:   - call office if I gain more than 2 pounds in one day or 5 pounds in one week   - keep legs up while sitting   - use salt in moderation   - watch for swelling in feet, ankles and legs every day   - weigh myself daily   -call for concerning s/sx   -- activity or exercise based on tolerance encouraged     CODE STATUS: FULL              No follow-ups on file.              Thank you for allowing me to participate in the care of your patient,         SHAHID Bazan  [facility]  12/04/24  09:12 EDT      **Deon Disclaimer:**  Much of this encounter note is an electronic transcription/translation of spoken language to printed text. The electronic translation of spoken language may permit erroneous, or at times, nonsensical words or phrases to be inadvertently transcribed. Although I have reviewed the note for such errors, some may still exist.    The information in this note was reviewed and updated during the visit to be as accurate as possible. As many patients have chronic medical problems, many of their individual problems and ongoing plans do not change significantly from visit to visit.  This information is correct and is consistent with my treatment plan as of today's visit.

## 2024-12-05 ENCOUNTER — TELEPHONE (OUTPATIENT)
Dept: CARDIOLOGY | Facility: HOSPITAL | Age: 89
End: 2024-12-05
Payer: MEDICARE

## 2024-12-05 NOTE — TELEPHONE ENCOUNTER
----- Message from Brooke Clay sent at 12/5/2024  8:48 AM EST -----  Please notify of results  Kidney function electrolytes are stable

## 2024-12-05 NOTE — TELEPHONE ENCOUNTER
Called and informed patient daughter of lab results per Dorothea KEY    Patient verbalized understanding

## 2024-12-17 NOTE — TELEPHONE ENCOUNTER
Rx Refill Note  Requested Prescriptions     Pending Prescriptions Disp Refills    Magnesium Oxide -Mg Supplement 400 (240 Mg) MG tablet [Pharmacy Med Name: MAGNESIUM OXIDE 400 MG TABLET] 90 tablet 1     Sig: TAKE 1 TABLET BY MOUTH DAILY    vitamin B-12 (CYANOCOBALAMIN) 1000 MCG tablet [Pharmacy Med Name: VITAMIN B-12 1,000 MCG TABLET] 90 tablet 1     Sig: TAKE 1 TABLET BY MOUTH DAILY    pantoprazole (PROTONIX) 40 MG EC tablet [Pharmacy Med Name: PANTOPRAZOLE SOD DR 40 MG TAB] 90 tablet 1     Sig: TAKE 1 TABLET BY MOUTH DAILY      Last office visit with prescribing clinician: 9/11/2024   Last telemedicine visit with prescribing clinician: Visit date not found   Next office visit with prescribing clinician: 3/17/2025                         Would you like a call back once the refill request has been completed: [] Yes [] No    If the office needs to give you a call back, can they leave a voicemail: [] Yes [] No    Gordon Monroy  12/17/24, 16:17 EST

## 2024-12-18 RX ORDER — LANOLIN ALCOHOL/MO/W.PET/CERES
1000 CREAM (GRAM) TOPICAL DAILY
Qty: 90 TABLET | Refills: 1 | Status: SHIPPED | OUTPATIENT
Start: 2024-12-18

## 2024-12-18 RX ORDER — PANTOPRAZOLE SODIUM 40 MG/1
40 TABLET, DELAYED RELEASE ORAL DAILY
Qty: 90 TABLET | Refills: 1 | Status: SHIPPED | OUTPATIENT
Start: 2024-12-18

## 2024-12-18 RX ORDER — LANOLIN ALCOHOL/MO/W.PET/CERES
1 CREAM (GRAM) TOPICAL DAILY
Qty: 90 TABLET | Refills: 1 | Status: SHIPPED | OUTPATIENT
Start: 2024-12-18

## 2024-12-20 ENCOUNTER — APPOINTMENT (OUTPATIENT)
Dept: CT IMAGING | Facility: HOSPITAL | Age: 89
End: 2024-12-20
Payer: MEDICARE

## 2024-12-20 ENCOUNTER — HOSPITAL ENCOUNTER (OUTPATIENT)
Facility: HOSPITAL | Age: 89
Setting detail: OBSERVATION
Discharge: HOME | End: 2024-12-21
Attending: EMERGENCY MEDICINE | Admitting: HOSPITALIST
Payer: MEDICARE

## 2024-12-20 ENCOUNTER — APPOINTMENT (OUTPATIENT)
Dept: GENERAL RADIOLOGY | Facility: HOSPITAL | Age: 89
End: 2024-12-20
Payer: MEDICARE

## 2024-12-20 DIAGNOSIS — E04.9 THYROID GOITER: ICD-10-CM

## 2024-12-20 DIAGNOSIS — E04.2 NON-TOXIC MULTINODULAR GOITER: ICD-10-CM

## 2024-12-20 DIAGNOSIS — U07.1 COVID-19 VIRUS INFECTION: Primary | ICD-10-CM

## 2024-12-20 LAB
ALBUMIN SERPL-MCNC: 3.9 G/DL (ref 3.5–5.2)
ALBUMIN/GLOB SERPL: 1 G/DL
ALP SERPL-CCNC: 67 U/L (ref 39–117)
ALT SERPL W P-5'-P-CCNC: 5 U/L (ref 1–33)
ANION GAP SERPL CALCULATED.3IONS-SCNC: 8.3 MMOL/L (ref 5–15)
ANISOCYTOSIS BLD QL: ABNORMAL
AST SERPL-CCNC: 16 U/L (ref 1–32)
B PARAPERT DNA SPEC QL NAA+PROBE: NOT DETECTED
B PERT DNA SPEC QL NAA+PROBE: NOT DETECTED
BASOPHILS # BLD MANUAL: 0.04 10*3/MM3 (ref 0–0.2)
BASOPHILS NFR BLD MANUAL: 1 % (ref 0–1.5)
BILIRUB SERPL-MCNC: 1.7 MG/DL (ref 0–1.2)
BUN SERPL-MCNC: 21 MG/DL (ref 8–23)
BUN/CREAT SERPL: 19.4 (ref 7–25)
C PNEUM DNA NPH QL NAA+NON-PROBE: NOT DETECTED
CALCIUM SPEC-SCNC: 10.4 MG/DL (ref 8.6–10.5)
CHLORIDE SERPL-SCNC: 102 MMOL/L (ref 98–107)
CO2 SERPL-SCNC: 26.7 MMOL/L (ref 22–29)
CREAT SERPL-MCNC: 1.08 MG/DL (ref 0.57–1)
DEPRECATED RDW RBC AUTO: 49.1 FL (ref 37–54)
EGFRCR SERPLBLD CKD-EPI 2021: 49.2 ML/MIN/1.73
EOSINOPHIL # BLD MANUAL: 0 10*3/MM3 (ref 0–0.4)
EOSINOPHIL NFR BLD MANUAL: 0 % (ref 0.3–6.2)
ERYTHROCYTE [DISTWIDTH] IN BLOOD BY AUTOMATED COUNT: 12 % (ref 12.3–15.4)
FLUAV SUBTYP SPEC NAA+PROBE: NOT DETECTED
FLUBV RNA ISLT QL NAA+PROBE: NOT DETECTED
GEN 5 1HR TROPONIN T REFLEX: 20 NG/L
GLOBULIN UR ELPH-MCNC: 3.9 GM/DL
GLUCOSE SERPL-MCNC: 101 MG/DL (ref 65–99)
HADV DNA SPEC NAA+PROBE: NOT DETECTED
HCOV 229E RNA SPEC QL NAA+PROBE: NOT DETECTED
HCOV HKU1 RNA SPEC QL NAA+PROBE: NOT DETECTED
HCOV NL63 RNA SPEC QL NAA+PROBE: NOT DETECTED
HCOV OC43 RNA SPEC QL NAA+PROBE: NOT DETECTED
HCT VFR BLD AUTO: 36.9 % (ref 34–46.6)
HGB BLD-MCNC: 12.3 G/DL (ref 12–15.9)
HMPV RNA NPH QL NAA+NON-PROBE: NOT DETECTED
HOLD SPECIMEN: NORMAL
HOLD SPECIMEN: NORMAL
HPIV1 RNA ISLT QL NAA+PROBE: NOT DETECTED
HPIV2 RNA SPEC QL NAA+PROBE: NOT DETECTED
HPIV3 RNA NPH QL NAA+PROBE: NOT DETECTED
HPIV4 P GENE NPH QL NAA+PROBE: NOT DETECTED
LYMPHOCYTES # BLD MANUAL: 1.07 10*3/MM3 (ref 0.7–3.1)
LYMPHOCYTES NFR BLD MANUAL: 12.2 % (ref 5–12)
M PNEUMO IGG SER IA-ACNC: NOT DETECTED
MCH RBC QN AUTO: 36.8 PG (ref 26.6–33)
MCHC RBC AUTO-ENTMCNC: 33.3 G/DL (ref 31.5–35.7)
MCV RBC AUTO: 110.5 FL (ref 79–97)
MONOCYTES # BLD: 0.49 10*3/MM3 (ref 0.1–0.9)
NEUTROPHILS # BLD AUTO: 2.43 10*3/MM3 (ref 1.7–7)
NEUTROPHILS NFR BLD MANUAL: 60.2 % (ref 42.7–76)
NRBC BLD AUTO-RTO: 0 /100 WBC (ref 0–0.2)
NT-PROBNP SERPL-MCNC: 1449 PG/ML (ref 0–1800)
PLAT MORPH BLD: NORMAL
PLATELET # BLD AUTO: 105 10*3/MM3 (ref 140–450)
PMV BLD AUTO: 9.6 FL (ref 6–12)
POTASSIUM SERPL-SCNC: 4.4 MMOL/L (ref 3.5–5.2)
PROT SERPL-MCNC: 7.8 G/DL (ref 6–8.5)
QT INTERVAL: 442 MS
QTC INTERVAL: 471 MS
RBC # BLD AUTO: 3.34 10*6/MM3 (ref 3.77–5.28)
RHINOVIRUS RNA SPEC NAA+PROBE: NOT DETECTED
RSV RNA NPH QL NAA+NON-PROBE: NOT DETECTED
SARS-COV-2 RNA NPH QL NAA+NON-PROBE: DETECTED
SODIUM SERPL-SCNC: 137 MMOL/L (ref 136–145)
T4 FREE SERPL-MCNC: 1.28 NG/DL (ref 0.92–1.68)
TROPONIN T % DELTA: -31 %
TROPONIN T NUMERIC DELTA: -9 NG/L
TROPONIN T SERPL HS-MCNC: 29 NG/L
TSH SERPL DL<=0.05 MIU/L-ACNC: 0.35 UIU/ML (ref 0.27–4.2)
VARIANT LYMPHS NFR BLD MANUAL: 26.5 % (ref 19.6–45.3)
WBC MORPH BLD: NORMAL
WBC NRBC COR # BLD AUTO: 4.04 10*3/MM3 (ref 3.4–10.8)
WHOLE BLOOD HOLD COAG: NORMAL
WHOLE BLOOD HOLD SPECIMEN: NORMAL

## 2024-12-20 PROCEDURE — 85025 COMPLETE CBC W/AUTO DIFF WBC: CPT | Performed by: EMERGENCY MEDICINE

## 2024-12-20 PROCEDURE — 80053 COMPREHEN METABOLIC PANEL: CPT

## 2024-12-20 PROCEDURE — G0378 HOSPITAL OBSERVATION PER HR: HCPCS

## 2024-12-20 PROCEDURE — 84443 ASSAY THYROID STIM HORMONE: CPT | Performed by: EMERGENCY MEDICINE

## 2024-12-20 PROCEDURE — 71045 X-RAY EXAM CHEST 1 VIEW: CPT

## 2024-12-20 PROCEDURE — 84484 ASSAY OF TROPONIN QUANT: CPT | Performed by: EMERGENCY MEDICINE

## 2024-12-20 PROCEDURE — 83880 ASSAY OF NATRIURETIC PEPTIDE: CPT

## 2024-12-20 PROCEDURE — 36415 COLL VENOUS BLD VENIPUNCTURE: CPT

## 2024-12-20 PROCEDURE — 84439 ASSAY OF FREE THYROXINE: CPT | Performed by: EMERGENCY MEDICINE

## 2024-12-20 PROCEDURE — 85007 BL SMEAR W/DIFF WBC COUNT: CPT | Performed by: EMERGENCY MEDICINE

## 2024-12-20 PROCEDURE — 84484 ASSAY OF TROPONIN QUANT: CPT

## 2024-12-20 PROCEDURE — 93005 ELECTROCARDIOGRAM TRACING: CPT

## 2024-12-20 PROCEDURE — 99285 EMERGENCY DEPT VISIT HI MDM: CPT

## 2024-12-20 PROCEDURE — 0202U NFCT DS 22 TRGT SARS-COV-2: CPT | Performed by: EMERGENCY MEDICINE

## 2024-12-20 PROCEDURE — 70490 CT SOFT TISSUE NECK W/O DYE: CPT

## 2024-12-20 PROCEDURE — 93005 ELECTROCARDIOGRAM TRACING: CPT | Performed by: EMERGENCY MEDICINE

## 2024-12-20 RX ORDER — ACETAMINOPHEN 325 MG/1
650 TABLET ORAL EVERY 4 HOURS PRN
Status: DISCONTINUED | OUTPATIENT
Start: 2024-12-20 | End: 2024-12-21 | Stop reason: HOSPADM

## 2024-12-20 RX ORDER — SODIUM CHLORIDE 0.9 % (FLUSH) 0.9 %
10 SYRINGE (ML) INJECTION EVERY 12 HOURS SCHEDULED
Status: DISCONTINUED | OUTPATIENT
Start: 2024-12-20 | End: 2024-12-21 | Stop reason: HOSPADM

## 2024-12-20 RX ORDER — AMOXICILLIN 250 MG
2 CAPSULE ORAL 2 TIMES DAILY PRN
Status: DISCONTINUED | OUTPATIENT
Start: 2024-12-20 | End: 2024-12-21 | Stop reason: HOSPADM

## 2024-12-20 RX ORDER — ACETAMINOPHEN 650 MG/1
650 SUPPOSITORY RECTAL EVERY 4 HOURS PRN
Status: DISCONTINUED | OUTPATIENT
Start: 2024-12-20 | End: 2024-12-21 | Stop reason: HOSPADM

## 2024-12-20 RX ORDER — SODIUM CHLORIDE 0.9 % (FLUSH) 0.9 %
10 SYRINGE (ML) INJECTION AS NEEDED
Status: DISCONTINUED | OUTPATIENT
Start: 2024-12-20 | End: 2024-12-21 | Stop reason: HOSPADM

## 2024-12-20 RX ORDER — MESALAMINE 0.38 G/1
1.5 CAPSULE, EXTENDED RELEASE ORAL DAILY
Status: DISCONTINUED | OUTPATIENT
Start: 2024-12-20 | End: 2024-12-21 | Stop reason: HOSPADM

## 2024-12-20 RX ORDER — SODIUM CHLORIDE 9 MG/ML
40 INJECTION, SOLUTION INTRAVENOUS AS NEEDED
Status: DISCONTINUED | OUTPATIENT
Start: 2024-12-20 | End: 2024-12-21 | Stop reason: HOSPADM

## 2024-12-20 RX ORDER — BISACODYL 5 MG/1
5 TABLET, DELAYED RELEASE ORAL DAILY PRN
Status: DISCONTINUED | OUTPATIENT
Start: 2024-12-20 | End: 2024-12-21 | Stop reason: HOSPADM

## 2024-12-20 RX ORDER — MESALAMINE 1000 MG/1
1000 SUPPOSITORY RECTAL NIGHTLY
Status: DISCONTINUED | OUTPATIENT
Start: 2024-12-20 | End: 2024-12-21 | Stop reason: HOSPADM

## 2024-12-20 RX ORDER — FUROSEMIDE 20 MG/1
20 TABLET ORAL DAILY
Status: DISCONTINUED | OUTPATIENT
Start: 2024-12-20 | End: 2024-12-21 | Stop reason: HOSPADM

## 2024-12-20 RX ORDER — ACETAMINOPHEN 160 MG/5ML
650 SOLUTION ORAL EVERY 4 HOURS PRN
Status: DISCONTINUED | OUTPATIENT
Start: 2024-12-20 | End: 2024-12-21 | Stop reason: HOSPADM

## 2024-12-20 RX ORDER — BISACODYL 10 MG
10 SUPPOSITORY, RECTAL RECTAL DAILY PRN
Status: DISCONTINUED | OUTPATIENT
Start: 2024-12-20 | End: 2024-12-21 | Stop reason: HOSPADM

## 2024-12-20 RX ORDER — ONDANSETRON 2 MG/ML
4 INJECTION INTRAMUSCULAR; INTRAVENOUS EVERY 6 HOURS PRN
Status: DISCONTINUED | OUTPATIENT
Start: 2024-12-20 | End: 2024-12-21 | Stop reason: HOSPADM

## 2024-12-20 RX ORDER — PANTOPRAZOLE SODIUM 40 MG/1
40 TABLET, DELAYED RELEASE ORAL DAILY
Status: DISCONTINUED | OUTPATIENT
Start: 2024-12-20 | End: 2024-12-21 | Stop reason: HOSPADM

## 2024-12-20 RX ORDER — ONDANSETRON 4 MG/1
4 TABLET, ORALLY DISINTEGRATING ORAL EVERY 6 HOURS PRN
Status: DISCONTINUED | OUTPATIENT
Start: 2024-12-20 | End: 2024-12-21 | Stop reason: HOSPADM

## 2024-12-20 RX ORDER — MULTIVITAMIN WITH IRON
1000 TABLET ORAL DAILY
Status: DISCONTINUED | OUTPATIENT
Start: 2024-12-20 | End: 2024-12-21 | Stop reason: HOSPADM

## 2024-12-20 RX ORDER — POLYETHYLENE GLYCOL 3350 17 G/17G
17 POWDER, FOR SOLUTION ORAL DAILY PRN
Status: DISCONTINUED | OUTPATIENT
Start: 2024-12-20 | End: 2024-12-21 | Stop reason: HOSPADM

## 2024-12-20 RX ADMIN — MESALAMINE 1.5 G: 0.38 CAPSULE, EXTENDED RELEASE ORAL at 18:11

## 2024-12-20 RX ADMIN — Medication 1000 MCG: at 16:04

## 2024-12-20 RX ADMIN — Medication 12.5 MG: at 16:04

## 2024-12-20 RX ADMIN — MAGNESIUM OXIDE 400 MG (241.3 MG MAGNESIUM) TABLET 200 MG: TABLET at 16:04

## 2024-12-20 RX ADMIN — PANTOPRAZOLE SODIUM 40 MG: 40 TABLET, DELAYED RELEASE ORAL at 16:04

## 2024-12-20 RX ADMIN — FUROSEMIDE 20 MG: 20 TABLET ORAL at 16:04

## 2024-12-20 NOTE — ED PROVIDER NOTES
EMERGENCY DEPARTMENT ENCOUNTER      Room Number:  P693/1  PCP: Mega Esparza MD  Independent Historians: Patient and Family  Patient Care Team:  Mega Esparza MD as PCP - General (Family Medicine)  Micaela English MD as Consulting Physician (Gastroenterology)  Mary Grace Culp MD as Consulting Physician (Cardiology)  Jp Krause Jr., MD as Consulting Physician (Hematology and Oncology)  Yasmeen Pichardo [facility] as Pharmacist  Micaela English MD as Referring Physician (Gastroenterology)  Devon Valadez MD as Consulting Physician (Hematology and Oncology)  Rusty Interiano, JamarD as Pharmacist (Pharmacy)  Catie Jules PA as Physician Assistant (Gastroenterology)  Antonio Foster MD as Surgeon (General Surgery)  Valeria Darling APRN as Nurse Practitioner (Obstetrics and Gynecology)  Mark Win MD PhD as Consulting Physician (Cardiology)  Mark Win MD PhD as Referring Physician (Cardiology)  Carmelo Coates MD as Consulting Physician (Hematology and Oncology)  Carmelo Coates MD as Consulting Physician (Hematology and Oncology)       HPI:  Chief Complaint: Coughing/choking    A complete HPI/ROS/PMH/PSH/SH/FH are unobtainable due to: None    Chronic or social conditions impacting patient care (Social Determinants of Health): None      Context: Brittany Villeda is a 89 y.o. female with a PMH significant for chronic back pain, pulmonary hypertension, diastolic dysfunction, paroxysmal SVT, paroxysmal atrial fibrillation, duodenal ulcers, COPD, intracerebral hemorrhage who presents to the ED c/o acute coughing and choking that began this morning.  The patient reports that she was feeling normal last night and when she woke up this morning but she had an episode of choking that occurred when she took a deep breath shortly after waking up.  She has had persistent discomfort with swallowing since then as well as coughing when she tries to take a deep breath.  No nausea,  vomiting.  She does report some belching this morning as well.      Upon review of prior external notes (non-ED) -and- Review of prior external test results outside of this encounter it appears the patient was evaluated in the office with cardiology for pulmonary hypertension on December 4, 2024.  The patient had a normal lipase on 9/20/2024 and a normal BMP on 5/10/2024.      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    MGUS (monoclonal gammopathy of unknown significance) 09/16/2016    Ulcerative rectosigmoiditis without complication 11/30/2017    Lichen sclerosus 08/23/2017    Heart block 10/30/2018    Sleep-related hypoxia 12/22/2018    Bradycardia 12/29/2018    History of atrial fibrillation 03/19/2019    Pacemaker 03/19/2019    Paroxysmal SVT (supraventricular tachycardia) 05/08/2019    History of chest pain 05/08/2019    Palpitations 05/08/2019    Paroxysmal atrial fibrillation 10/06/2019    Ascending aortic aneurysm 08/24/2020    Mediastinal lymphadenopathy 09/09/2020    Anemia     Obesity (BMI 30-39.9)     Generalized weakness 04/08/2021    Dysautonomia 04/09/2021    Hypercalcemia 04/11/2021    Hyperparathyroidism 04/28/2021    Choledocholithiasis 05/09/2021    Duodenal ulcer 05/13/2021    Hemorrhagic gastritis 05/13/2021    Exertional dyspnea 06/22/2021    COPD (chronic obstructive pulmonary disease)     Osteoarthritis of glenohumeral joint 07/12/2018    ICH (intracerebral hemorrhage) 08/05/2021    Lower GI bleed 11/01/2022    Thrombocytopenia 11/01/2022    Lichen sclerosus of female  genitalia 03/13/2023    Senile atrophic vaginitis 03/13/2023    Left inguinal hernia 08/23/2023    Vulvar pain 02/01/2024    Bilateral lower extremity edema 04/24/2024    Chronic heart failure with preserved ejection fraction 04/24/2024    Acute renal insufficiency 04/25/2024    Dysfunction of right cardiac ventricle 05/29/2024     Resolved Ambulatory Problems     Diagnosis Date Noted    Abnormal weight loss 05/12/2016    Tachycardia 05/12/2016    Sciatica 05/12/2016    Nausea and vomiting 05/12/2016    Hyperthyroidism 05/12/2016    Fatigue 05/12/2016    Dizziness 05/12/2016    Diarrhea 05/12/2016    Abnormal thyroid stimulating hormone (TSH) level 05/12/2016    Abdominal pain 05/12/2016    Abnormal EKG 06/30/2016    Leukopenia 09/12/2016    Other chest pain 12/24/2017    Shoulder arthritis 01/28/2019    Rectal bleeding 10/15/2019    Arthritis 12/13/2019    Immobility 11/20/2020    Left knee pain 11/20/2020    Chronic anticoagulation 11/20/2020    Hemarthrosis of left knee 11/20/2020    Acute UTI (urinary tract infection) 04/08/2021    Weakness of both lower extremities 04/09/2021    Spondylosis of lumbosacral region without myelopathy or radiculopathy 04/09/2021    Macrocytosis 04/11/2021    Arthritis of right wrist 04/13/2021    Hypomagnesemia 04/13/2021    Essential hypertension 05/10/2021    Hyperglycemia 05/10/2021    Epigastric pain 05/12/2021    Functional quadriplegia 11/20/2020    Hip pain 04/12/2018    Other malaise and fatigue 05/25/2021    Shoulder pain 04/12/2018    Subdural hematoma 08/05/2021    Melena 09/29/2022    GI bleed 11/01/2022    Hypokalemia 04/26/2024     Past Medical History:   Diagnosis Date    Abdominal aortic aneurysm     Arrhythmia     Asthma     CHF (congestive heart failure) 04/2023    Colitis     GERD (gastroesophageal reflux disease)     Hypertension     Hypertensive heart disease     Inguinal hernia     Kidney stone     Nephrolithiasis     Peptic ulceration     Pressure ulcer      PSVT (paroxysmal supraventricular tachycardia)     Rectal bleed     Sleep apnea     Systemic hypertension     Ventricular tachycardia          PAST SURGICAL HISTORY  Past Surgical History:   Procedure Laterality Date    BACK SURGERY      lumbar fusion    DUSTY HOLE Left 08/08/2021    Procedure: Left-sided dusty holes for evacuation of subdural hematoma;  Surgeon: Gustavo Callaway MD;  Location: [facility] MAIN OR;  Service: Neurosurgery;  Laterality: Left;    CARDIAC ELECTROPHYSIOLOGY PROCEDURE N/A 11/07/2018    Procedure: Pacemaker DC new   [place];  Surgeon: Jesus Granda MD;  Location: [facility] CATH INVASIVE LOCATION;  Service: Cardiology    CHOLECYSTECTOMY      COLONOSCOPY  06/16/2014    colitis, cryptitis,  tics, NBIH, TA w/low grade dysplasia    COLONOSCOPY N/A 10/28/2019    Procedure: COLONOSCOPY WITH COLD AND HOT POLYPECTOMIES;  Surgeon: Micaela English MD;  Location: [facility] ENDOSCOPY;  Service: Gastroenterology    ENDOSCOPY N/A 05/13/2021    Procedure: ESOPHAGOGASTRODUODENOSCOPY with BX;  Surgeon: Stefan Mcfadden MD;  Location: [facility] ENDOSCOPY;  Service: Gastroenterology;  Laterality: N/A;  EPIGASTRIC PAIN  --DUODENAL ULCER, HEMORRHAGIC GASTRITIS, HIATAL HERNIA     ENDOSCOPY N/A 10/11/2022    Procedure: ESOPHAGOGASTRODUODENOSCOPY;  Surgeon: Micaela English MD;  Location: [facility] ENDOSCOPY;  Service: Gastroenterology;  Laterality: N/A;  PRE- MELENA  POST- ESOPHAGITIS    HEMORRHOIDECTOMY      HERNIA REPAIR      umbilical    HYSTERECTOMY      INGUINAL HERNIA REPAIR Left 9/1/2023    Procedure: INGUINAL HERNIA REPAIR LEFT;  Surgeon: Antonio Foster MD;  Location: [facility] OR OSC;  Service: General;  Laterality: Left;    JOINT REPLACEMENT Bilateral     KNEES    MYOMECTOMY      PACEMAKER IMPLANTATION      SHOULDER SURGERY      SIGMOIDOSCOPY N/A 11/02/2022    Procedure: SIGMOIDOSCOPY FLEXIBLE WITH COLD BIOPSIES AND ASPIRATE;  Surgeon: Micaela English MD;  Location: [facility] ENDOSCOPY;  Service:  Gastroenterology;  Laterality: N/A;  PRE- RECTAL BLEEDING  POST- HEMORRHOIDS, DIVERTICULOSIS, COLITIS    SINUS SURGERY      TOE NAIL AMPUTATION  2019    TONSILLECTOMY           FAMILY HISTORY  Family History   Problem Relation Age of Onset    Diabetes Mother     Breast cancer Sister     Kidney cancer Sister     Heart disease Sister     Prostate cancer Brother     Prostate cancer Brother     Prostate cancer Brother     Malig Hyperthermia Neg Hx          SOCIAL HISTORY  Social History     Socioeconomic History    Marital status:     Number of children: 10    Years of education: High School   Tobacco Use    Smoking status: Former     Current packs/day: 0.00     Average packs/day: 1.5 packs/day for 12.0 years (18.0 ttl pk-yrs)     Types: Cigarettes     Start date:      Quit date:      Years since quittin.0     Passive exposure: Past    Smokeless tobacco: Never    Tobacco comments:     QUIT SMOKING    Vaping Use    Vaping status: Never Used   Substance and Sexual Activity    Alcohol use: No     Comment: caffeine - decaf coffee    Drug use: Never    Sexual activity: Defer         ALLERGIES  Codeine, Amitriptyline, Amoxicillin-pot clavulanate, Aspirin, Carisoprodol-aspirin-codeine, Iodinated contrast media, Latex, Naproxen, Nsaids, Soma compound with codeine [carisoprodol-aspirin-codeine], Sulfa antibiotics, and Tramadol      REVIEW OF SYSTEMS  Included in HPI  All systems reviewed and negative except for those discussed in HPI.      PHYSICAL EXAM    I have reviewed the triage vital signs and nursing notes.    ED Triage Vitals [24 0933]   Temp Heart Rate Resp BP SpO2   98 °F (36.7 °C) 60 19 133/82 100 %      Temp src Heart Rate Source Patient Position BP Location FiO2 (%)   -- -- -- -- --       Physical Exam  Constitutional:       General: She is not in acute distress.     Appearance: She is well-developed. She is not ill-appearing.      Comments: Persistent cough noted on exam when she  tries to take a deep breath   HENT:      Head: Normocephalic and atraumatic.   Eyes:      General: No scleral icterus.     Conjunctiva/sclera: Conjunctivae normal.   Neck:      Trachea: No tracheal deviation.   Cardiovascular:      Rate and Rhythm: Normal rate and regular rhythm.   Pulmonary:      Effort: Pulmonary effort is normal.      Breath sounds: Normal breath sounds.   Abdominal:      Palpations: Abdomen is soft.      Tenderness: There is no abdominal tenderness.   Musculoskeletal:         General: No deformity.      Cervical back: Normal range of motion.   Lymphadenopathy:      Cervical: No cervical adenopathy.   Skin:     General: Skin is warm and dry.   Neurological:      Mental Status: She is alert and oriented to person, place, and time.   Psychiatric:         Behavior: Behavior normal.         Vital signs and nursing notes reviewed.      PPE: I wore a surgical mask throughout my encounters with the pt. I performed hand hygiene on entry into the pt room and upon exit.     LAB RESULTS  Recent Results (from the past 24 hours)   ECG 12 Lead ED Triage Standing Order; SOA    Collection Time: 12/20/24  9:48 AM   Result Value Ref Range    QT Interval 442 ms    QTC Interval 471 ms   Comprehensive Metabolic Panel    Collection Time: 12/20/24  9:55 AM    Specimen: Blood   Result Value Ref Range    Glucose 101 (H) 65 - 99 mg/dL    BUN 21 8 - 23 mg/dL    Creatinine 1.08 (H) 0.57 - 1.00 mg/dL    Sodium 137 136 - 145 mmol/L    Potassium 4.4 3.5 - 5.2 mmol/L    Chloride 102 98 - 107 mmol/L    CO2 26.7 22.0 - 29.0 mmol/L    Calcium 10.4 8.6 - 10.5 mg/dL    Total Protein 7.8 6.0 - 8.5 g/dL    Albumin 3.9 3.5 - 5.2 g/dL    ALT (SGPT) 5 1 - 33 U/L    AST (SGOT) 16 1 - 32 U/L    Alkaline Phosphatase 67 39 - 117 U/L    Total Bilirubin 1.7 (H) 0.0 - 1.2 mg/dL    Globulin 3.9 gm/dL    A/G Ratio 1.0 g/dL    BUN/Creatinine Ratio 19.4 7.0 - 25.0    Anion Gap 8.3 5.0 - 15.0 mmol/L    eGFR 49.2 (L) >60.0 mL/min/1.73   BNP     Collection Time: 12/20/24  9:55 AM    Specimen: Blood   Result Value Ref Range    proBNP 1,449.0 0.0 - 1,800.0 pg/mL   High Sensitivity Troponin T    Collection Time: 12/20/24  9:55 AM    Specimen: Blood   Result Value Ref Range    HS Troponin T 29 (H) <14 ng/L   Green Top (Gel)    Collection Time: 12/20/24  9:55 AM   Result Value Ref Range    Extra Tube Hold for add-ons.    Lavender Top    Collection Time: 12/20/24  9:55 AM   Result Value Ref Range    Extra Tube hold for add-on    Gold Top - SST    Collection Time: 12/20/24  9:55 AM   Result Value Ref Range    Extra Tube Hold for add-ons.    Light Blue Top    Collection Time: 12/20/24  9:55 AM   Result Value Ref Range    Extra Tube Hold for add-ons.    CBC Auto Differential    Collection Time: 12/20/24  9:55 AM    Specimen: Blood   Result Value Ref Range    WBC 4.04 3.40 - 10.80 10*3/mm3    RBC 3.34 (L) 3.77 - 5.28 10*6/mm3    Hemoglobin 12.3 12.0 - 15.9 g/dL    Hematocrit 36.9 34.0 - 46.6 %    .5 (H) 79.0 - 97.0 fL    MCH 36.8 (H) 26.6 - 33.0 pg    MCHC 33.3 31.5 - 35.7 g/dL    RDW 12.0 (L) 12.3 - 15.4 %    RDW-SD 49.1 37.0 - 54.0 fl    MPV 9.6 6.0 - 12.0 fL    Platelets 105 (L) 140 - 450 10*3/mm3    nRBC 0.0 0.0 - 0.2 /100 WBC   Manual Differential    Collection Time: 12/20/24  9:55 AM    Specimen: Blood   Result Value Ref Range    Neutrophil % 60.2 42.7 - 76.0 %    Lymphocyte % 26.5 19.6 - 45.3 %    Monocyte % 12.2 (H) 5.0 - 12.0 %    Eosinophil % 0.0 (L) 0.3 - 6.2 %    Basophil % 1.0 0.0 - 1.5 %    Neutrophils Absolute 2.43 1.70 - 7.00 10*3/mm3    Lymphocytes Absolute 1.07 0.70 - 3.10 10*3/mm3    Monocytes Absolute 0.49 0.10 - 0.90 10*3/mm3    Eosinophils Absolute 0.00 0.00 - 0.40 10*3/mm3    Basophils Absolute 0.04 0.00 - 0.20 10*3/mm3    Anisocytosis Mod/2+ None Seen    WBC Morphology Normal Normal    Platelet Morphology Normal Normal   Respiratory Panel PCR w/COVID-19(SARS-CoV-2) ANJU/HEMA/ZENOBIA/PAD/COR/DANI In-House, NP Swab in UTM/VTM, 2 HR TAT - Swab,  Nasopharynx    Collection Time: 12/20/24  9:56 AM    Specimen: Nasopharynx; Swab   Result Value Ref Range    ADENOVIRUS, PCR Not Detected Not Detected    Coronavirus 229E Not Detected Not Detected    Coronavirus HKU1 Not Detected Not Detected    Coronavirus NL63 Not Detected Not Detected    Coronavirus OC43 Not Detected Not Detected    COVID19 Detected (C) Not Detected - Ref. Range    Human Metapneumovirus Not Detected Not Detected    Human Rhinovirus/Enterovirus Not Detected Not Detected    Influenza A PCR Not Detected Not Detected    Influenza B PCR Not Detected Not Detected    Parainfluenza Virus 1 Not Detected Not Detected    Parainfluenza Virus 2 Not Detected Not Detected    Parainfluenza Virus 3 Not Detected Not Detected    Parainfluenza Virus 4 Not Detected Not Detected    RSV, PCR Not Detected Not Detected    Bordetella pertussis pcr Not Detected Not Detected    Bordetella parapertussis PCR Not Detected Not Detected    Chlamydophila pneumoniae PCR Not Detected Not Detected    Mycoplasma pneumo by PCR Not Detected Not Detected   High Sensitivity Troponin T 1Hr    Collection Time: 12/20/24 11:41 AM    Specimen: Arm, Left; Blood   Result Value Ref Range    HS Troponin T 20 (H) <14 ng/L    Troponin T Numeric Delta -9 ng/L    Troponin T % Delta -31 Abnormal if >/= 20% %   TSH    Collection Time: 12/20/24 11:41 AM    Specimen: Arm, Left; Blood   Result Value Ref Range    TSH 0.346 0.270 - 4.200 uIU/mL   T4, Free    Collection Time: 12/20/24 11:41 AM    Specimen: Arm, Left; Blood   Result Value Ref Range    Free T4 1.28 0.92 - 1.68 ng/dL   Comprehensive Metabolic Panel    Collection Time: 12/21/24  3:12 AM    Specimen: Blood   Result Value Ref Range    Glucose 83 65 - 99 mg/dL    BUN 18 8 - 23 mg/dL    Creatinine 0.93 0.57 - 1.00 mg/dL    Sodium 136 136 - 145 mmol/L    Potassium 4.4 3.5 - 5.2 mmol/L    Chloride 101 98 - 107 mmol/L    CO2 24.6 22.0 - 29.0 mmol/L    Calcium 9.6 8.6 - 10.5 mg/dL    Total Protein 6.4  6.0 - 8.5 g/dL    Albumin 3.3 (L) 3.5 - 5.2 g/dL    ALT (SGPT) 5 1 - 33 U/L    AST (SGOT) 16 1 - 32 U/L    Alkaline Phosphatase 59 39 - 117 U/L    Total Bilirubin 1.6 (H) 0.0 - 1.2 mg/dL    Globulin 3.1 gm/dL    A/G Ratio 1.1 g/dL    BUN/Creatinine Ratio 19.4 7.0 - 25.0    Anion Gap 10.4 5.0 - 15.0 mmol/L    eGFR 58.9 (L) >60.0 mL/min/1.73   Magnesium    Collection Time: 12/21/24  3:12 AM    Specimen: Blood   Result Value Ref Range    Magnesium 1.5 (L) 1.6 - 2.4 mg/dL   Phosphorus    Collection Time: 12/21/24  3:12 AM    Specimen: Blood   Result Value Ref Range    Phosphorus 2.5 2.5 - 4.5 mg/dL   Vitamin B12    Collection Time: 12/21/24  3:12 AM    Specimen: Blood   Result Value Ref Range    Vitamin B-12 1,964 (H) 211 - 946 pg/mL   Folate    Collection Time: 12/21/24  3:12 AM    Specimen: Blood   Result Value Ref Range    Folate 8.62 4.78 - 24.20 ng/mL   PTH, Intact    Collection Time: 12/21/24  3:12 AM    Specimen: Blood   Result Value Ref Range    PTH, Intact 144.0 (H) 15.0 - 65.0 pg/mL   Bilirubin, Direct    Collection Time: 12/21/24  3:12 AM    Specimen: Blood   Result Value Ref Range    Bilirubin, Direct 0.3 0.0 - 0.3 mg/dL         RADIOLOGY  CT Soft Tissue Neck Without Contrast    Result Date: 12/20/2024  CT NECK WITHOUT CONTRAST  HISTORY: Choking sensation, evaluate for foreign body.  COMPARISON: CT chest 5/18/2024. No prior CT examination of the neck is available for comparison.  FINDINGS: The sensitivity of the study is reduced as intravenous contrast was not utilized. There is no evidence of a radiopaque foreign body. The parotid and submandibular glands appear grossly unremarkable. The thyroid is diffusely enlarged and extends into the superior mediastinum consistent with a goiter. Small scattered calcifications are appreciated. The left lobe of the thyroid measures 5.5 x 5.3 x 8.5 cm in size. The right lobe of the thyroid measures approximately 5.5 x 4.0 x 7.3 cm in size.      1.  There is a large goiter  narrowing the trachea. Clinical correlation is recommended. 2.  There is no evidence of a radiopaque foreign body. Further evaluation could be performed with endoscopy as indicated.  The above information was called to and discussed with Todd Arrington. There is narrowing of the upper trachea in the transverse dimension.   Radiation dose reduction techniques were utilized, including automated exposure control and exposure modulation based on body size.   This report was finalized on 12/20/2024 12:01 PM by Dr. Seun Hernandez M.D on Workstation: [id number]      XR Chest 1 View    Result Date: 12/20/2024  XR CHEST 1 VW-   INDICATION: Shortness of air  COMPARISON: Chest radiograph May 18, 2024  TECHNIQUE: 1 view chest  FINDINGS:  Small bibasilar lung opacities. No effusions. Stable mediastinum. Widening of the superior mediastinum. Elevated right hemidiaphragm. Left-sided pacemaker with a right atrial and right ventricular lead. Cholecystectomy clips.       1. Stable chest. 2. Small bibasilar opacities with elevated right hemidiaphragm. Suspect subsegmental atelectasis. 3. Widening of the superior mediastinum, consistent with goiter as seen on CT.  This report was finalized on 12/20/2024 10:20 AM by Dr. Jameel Butler M.D on Workstation: [id number]         MEDICATIONS GIVEN IN ER  Medications   sodium chloride 0.9 % flush 10 mL (has no administration in time range)   furosemide (LASIX) tablet 20 mg (20 mg Oral Given 12/20/24 1604)   magnesium oxide (MAG-OX) tablet 200 mg (200 mg Oral Given 12/20/24 1604)   mesalamine (APRISO) 24 hr capsule 1.5 g (1.5 g Oral Given 12/20/24 1811)   mesalamine (CANASA) suppository 1,000 mg (1,000 mg Rectal Not Given 12/20/24 2125)   pantoprazole (PROTONIX) EC tablet 40 mg (40 mg Oral Given 12/20/24 1604)   spironolactone (ALDACTONE) half tablet 12.5 mg (12.5 mg Oral Given 12/20/24 1604)   vitamin B-12 (CYANOCOBALAMIN) tablet 1,000 mcg (1,000 mcg Oral Given 12/20/24 1604)   sodium  chloride 0.9 % flush 10 mL (10 mL Intravenous Not Given 12/20/24 2125)   sodium chloride 0.9 % flush 10 mL (has no administration in time range)   sodium chloride 0.9 % infusion 40 mL (has no administration in time range)   nirmatrelvir and ritonavir (150mg/100mg) (PAXLOVID) 2 tablet pack- for renal adjustment (2 tablets Oral Not Given 12/20/24 2124)   acetaminophen (TYLENOL) tablet 650 mg (650 mg Oral Not Given 12/20/24 2244)     Or   acetaminophen (TYLENOL) 160 MG/5ML oral solution 650 mg ( Oral Not Given:  See Alt 12/20/24 2244)     Or   acetaminophen (TYLENOL) suppository 650 mg ( Rectal Not Given:  See Alt 12/20/24 2244)   sennosides-docusate (PERICOLACE) 8.6-50 MG per tablet 2 tablet (has no administration in time range)     And   polyethylene glycol (MIRALAX) packet 17 g (has no administration in time range)     And   bisacodyl (DULCOLAX) EC tablet 5 mg (has no administration in time range)     And   bisacodyl (DULCOLAX) suppository 10 mg (has no administration in time range)   ondansetron ODT (ZOFRAN-ODT) disintegrating tablet 4 mg (has no administration in time range)     Or   ondansetron (ZOFRAN) injection 4 mg (has no administration in time range)         ORDERS PLACED DURING THIS VISIT:  Orders Placed This Encounter   Procedures    Respiratory Panel PCR w/COVID-19(SARS-CoV-2) ANJU/HEMA/ZENOBIA/PAD/COR/DANI In-House, NP Swab in UTM/VTM, 2 HR TAT - Swab, Nasopharynx    XR Chest 1 View    CT Soft Tissue Neck Without Contrast    Flanagan Draw    Comprehensive Metabolic Panel    BNP    High Sensitivity Troponin T    CBC Auto Differential    Manual Differential    High Sensitivity Troponin T 1Hr    TSH    T4, Free    Comprehensive Metabolic Panel    Magnesium    Phosphorus    Vitamin B12    Folate    PTH, Intact    Bilirubin, Direct    Diet: Regular/House, Cardiac; Healthy Heart (2-3 Na+); Fluid Consistency: Thin (IDDSI 0)    Undress & Gown    Vital Signs    Discontinue Cardiac Monitoring    Vital Signs    Intake &  Output    Weigh Patient    Oral Care    Saline Lock & Maintain IV Access    Place Sequential Compression Device    Maintain Sequential Compression Device    Activity - Ad Cathi    Continuous Pulse Oximetry    Code Status and Medical Interventions: CPR (Attempt to Resuscitate); Full Support    L[facility] (on-call MD unless specified) Details    Inpatient ENT Consult    Oxygen Therapy- Nasal Cannula; Titrate 1-6 LPM Per SpO2; 90 - 95%    NPPV Settings CPAP; Home Device Available; Home Settings    ECG 12 Lead ED Triage Standing Order; SOA    Insert Peripheral IV    Insert Peripheral IV    Initiate Observation Status    CBC & Differential    Green Top (Gel)    Lavender Top    Gold Top - SST    Light Blue Top         OUTPATIENT MEDICATION MANAGEMENT:  Current Facility-Administered Medications Ordered in Epic   Medication Dose Route Frequency Provider Last Rate Last Admin    acetaminophen (TYLENOL) tablet 650 mg  650 mg Oral Q4H PRN Vargas Jones MD        Or    acetaminophen (TYLENOL) 160 MG/5ML oral solution 650 mg  650 mg Oral Q4H PRN Vargas Jones MD        Or    acetaminophen (TYLENOL) suppository 650 mg  650 mg Rectal Q4H PRN aVrgas Jones MD        sennosides-docusate (PERICOLACE) 8.6-50 MG per tablet 2 tablet  2 tablet Oral BID PRN Vargas Jones MD        And    polyethylene glycol (MIRALAX) packet 17 g  17 g Oral Daily PRN Vargas Jones MD        And    bisacodyl (DULCOLAX) EC tablet 5 mg  5 mg Oral Daily PRN Vargas Jones MD        And    bisacodyl (DULCOLAX) suppository 10 mg  10 mg Rectal Daily PRN Vargas Jones MD        furosemide (LASIX) tablet 20 mg  20 mg Oral Daily Vargas Jones MD   20 mg at 12/20/24 1604    magnesium oxide (MAG-OX) tablet 200 mg  200 mg Oral Daily Vargas Jones MD   200 mg at 12/20/24 1604    mesalamine (APRISO) 24 hr capsule 1.5 g  1.5 g Oral Daily Vargas Jones MD   1.5 g at 12/20/24 1811    mesalamine (CANASA) suppository  1,000 mg  1,000 mg Rectal Nightly Vargas Jones MD        nirmatrelvir and ritonavir (150mg/100mg) (PAXLOVID) 2 tablet pack- for renal adjustment  2 tablet Oral BID Vargas Jones MD        ondansetron ODT (ZOFRAN-ODT) disintegrating tablet 4 mg  4 mg Oral Q6H PRN Vargas Jones MD        Or    ondansetron (ZOFRAN) injection 4 mg  4 mg Intravenous Q6H PRN Vargas Jones MD        pantoprazole (PROTONIX) EC tablet 40 mg  40 mg Oral Daily Vargas Jones MD   40 mg at 12/20/24 1604    sodium chloride 0.9 % flush 10 mL  10 mL Intravenous PRN Vargas Jones MD        sodium chloride 0.9 % flush 10 mL  10 mL Intravenous Q12H Vargas Jones MD        sodium chloride 0.9 % flush 10 mL  10 mL Intravenous PRN Vargas Jones MD        sodium chloride 0.9 % infusion 40 mL  40 mL Intravenous PRN Vargas Jones MD        spironolactone (ALDACTONE) half tablet 12.5 mg  12.5 mg Oral Daily Vargas Jones MD   12.5 mg at 12/20/24 1604    vitamin B-12 (CYANOCOBALAMIN) tablet 1,000 mcg  1,000 mcg Oral Daily Vargas Jones MD   1,000 mcg at 12/20/24 1604     No current [facility]-ordered outpatient medications on file.              PROGRESS, DATA ANALYSIS, CONSULTS, AND MEDICAL DECISION MAKING  All labs have been independently interpreted by me.  All radiology studies have been reviewed by me. All EKG's have been independently viewed and interpreted by me.  Discussion below represents my analysis of pertinent findings related to patient's condition, differential diagnosis, treatment plan and final disposition.    DIFFERENTIAL DIAGNOSIS INCLUDE BUT NOT LIMITED TO:     Epiglottitis, goiter, viral syndrome    Clinical Scores: N/A      ED Course as of 12/21/24 0935   Fri Dec 20, 2024   1044 Per my independent interpretation of the chest x-ray, there is no obvious pneumothorax. [DC]   1047 proBNP: 1,449.0 [DC]   1047 HS Troponin T(!): 29 [DC]   1254 COVID19(!!): Detected [DC]    1306 COVID19(!!): Detected [JR]   1342 Patient reassessed.  She is not hypoxic and she has no stridor.  I explained the concerning findings regarding her large thyroid goiter with some mass effect upon her trachea, as well as COVID-19 infection.  I recommended admission for continued monitoring and involvement of ENT.  Patient and family are agreeable plan. [JR]   1344 EKG          EKG time: 9:48 AM  Rhythm/Rate: A-fib, 68  P waves and NE: N/A  QRS, axis: RBBB  ST and T waves: No acute ischemic changes    Interpreted Contemporaneously by me, independently viewed  Similar compared to prior 5/18/2024       [JR]   1359 Discussed with Dr. Jones, [facility] hospitalist, who agrees to admit to [facility]. [JR]      ED Course User Index  [DC] Luc Arrington, PA  [JR] Tony Castro MD              AS OF 09:35 EST VITALS:    BP - 93/59  HR - 60  TEMP - 99.8 °F (37.7 °C) (Oral)  O2 SATS - 99%    COMPLEXITY OF CARE  The patient requires admission.      DIAGNOSIS  Final diagnoses:   COVID-19 virus infection   Thyroid goiter         DISPOSITION  ED Disposition       ED Disposition   Decision to Admit    Condition   --    Comment   Level of Care: Med/Surg [1]   Diagnosis: COVID-19 virus infection [[id number]]   Admitting Physician: DAVID JONES [[id number]]   Attending Physician: DAVID JONES [[id number]]   Is patient appropriate for Inpatient Observation Unit?: No [0]                  Please note that portions of this document were completed with a voice recognition program.    Note Disclaimer: At [facility], we believe that sharing information builds trust and better relationships. You are receiving this note because you recently visited [facility]. It is possible you will see health information before a provider has talked with you about it. This kind of information can be easy to misunderstand. To help you fully understand what it means for your health, we urge you to discuss this note with your  provider.         Luc Arrington PA  12/21/24 0935

## 2024-12-20 NOTE — ED NOTES
"Nursing report ED to floor  Brittany Villeda  89 y.o.  female    HPI :  HPI  Stated Reason for Visit: cough    Chief Complaint  Chief Complaint   Patient presents with    Cough       Admitting doctor:   Vargas Jones MD    Admitting diagnosis:   The primary encounter diagnosis was COVID-19 virus infection. A diagnosis of Thyroid goiter was also pertinent to this visit.    Code status:   Current Code Status       Date Active Code Status Order ID Comments User Context       Prior            Allergies:   Codeine, Amitriptyline, Amoxicillin-pot clavulanate, Aspirin, Carisoprodol-aspirin-codeine, Iodinated contrast media, Latex, Naproxen, Nsaids, Soma compound with codeine [carisoprodol-aspirin-codeine], Sulfa antibiotics, and Tramadol    Isolation:   Enhanced Droplet/Contact     Intake and Output  No intake or output data in the 24 hours ending 12/20/24 1412    Weight:       12/20/24  1101   Weight: 74.8 kg (165 lb)       Most recent vitals:   Vitals:    12/20/24 0933 12/20/24 1101 12/20/24 1230   BP: 133/82 120/69 120/73   Pulse: 60 60 60   Resp: 19 20 20   Temp: 98 °F (36.7 °C)     SpO2: 100% 96% 98%   Weight:  74.8 kg (165 lb)    Height:  162.6 cm (64\")        Active LDAs/IV Access:   Lines, Drains & Airways       Active LDAs       None                    Labs (abnormal labs have a star):   Labs Reviewed   RESPIRATORY PANEL PCR W/ COVID-19 (SARS-COV-2), NP SWAB IN UTM/VTP, 2 HR TAT - Abnormal; Notable for the following components:       Result Value    COVID19 Detected (*)     All other components within normal limits    Narrative:     In the setting of a positive respiratory panel with a viral infection PLUS a negative procalcitonin without other underlying concern for bacterial infection, consider observing off antibiotics or discontinuation of antibiotics and continue supportive care. If the respiratory panel is positive for atypical bacterial infection (Bordetella pertussis, Chlamydophila pneumoniae, or " Mycoplasma pneumoniae), consider antibiotic de-escalation to target atypical bacterial infection.   COMPREHENSIVE METABOLIC PANEL - Abnormal; Notable for the following components:    Glucose 101 (*)     Creatinine 1.08 (*)     Total Bilirubin 1.7 (*)     eGFR 49.2 (*)     All other components within normal limits    Narrative:     GFR Categories in Chronic Kidney Disease (CKD)      GFR Category          GFR (mL/min/1.73)    Interpretation  G1                     90 or greater         Normal or high (1)  G2                      60-89                Mild decrease (1)  G3a                   45-59                Mild to moderate decrease  G3b                   30-44                Moderate to severe decrease  G4                    15-29                Severe decrease  G5                    14 or less           Kidney failure          (1)In the absence of evidence of kidney disease, neither GFR category G1 or G2 fulfill the criteria for CKD.    eGFR calculation 2021 CKD-EPI creatinine equation, which does not include race as a factor   TROPONIN - Abnormal; Notable for the following components:    HS Troponin T 29 (*)     All other components within normal limits    Narrative:     High Sensitive Troponin T Reference Range:  <14.0 ng/L- Negative Female for AMI  <22.0 ng/L- Negative Male for AMI  >=14 - Abnormal Female indicating possible myocardial injury.  >=22 - Abnormal Male indicating possible myocardial injury.   Clinicians would have to utilize clinical acumen, EKG, Troponin, and serial changes to determine if it is an Acute Myocardial Infarction or myocardial injury due to an underlying chronic condition.        CBC WITH AUTO DIFFERENTIAL - Abnormal; Notable for the following components:    RBC 3.34 (*)     .5 (*)     MCH 36.8 (*)     RDW 12.0 (*)     Platelets 105 (*)     All other components within normal limits   MANUAL DIFFERENTIAL - Abnormal; Notable for the following components:    Monocyte % 12.2 (*)      Eosinophil % 0.0 (*)     All other components within normal limits   HIGH SENSITIVITIY TROPONIN T 1HR - Abnormal; Notable for the following components:    HS Troponin T 20 (*)     All other components within normal limits    Narrative:     High Sensitive Troponin T Reference Range:  <14.0 ng/L- Negative Female for AMI  <22.0 ng/L- Negative Male for AMI  >=14 - Abnormal Female indicating possible myocardial injury.  >=22 - Abnormal Male indicating possible myocardial injury.   Clinicians would have to utilize clinical acumen, EKG, Troponin, and serial changes to determine if it is an Acute Myocardial Infarction or myocardial injury due to an underlying chronic condition.        BNP (IN-HOUSE) - Normal    Narrative:     This assay is used as an aid in the diagnosis of individuals suspected of having heart failure. It can be used as an aid in the diagnosis of acute decompensated heart failure (ADHF) in patients presenting with signs and symptoms of ADHF to the emergency department (ED). In addition, NT-proBNP of <300 pg/mL indicates ADHF is not likely.    Age Range Result Interpretation  NT-proBNP Concentration (pg/mL:      <50             Positive            >450                   Gray                 300-450                    Negative             <300    50-75           Positive            >900                  Gray                300-900                  Negative            <300      >75             Positive            >1800                  Gray                300-1800                  Negative            <300   RAINBOW DRAW    Narrative:     The following orders were created for panel order [place] Draw.  Procedure                               Abnormality         Status                     ---------                               -----------         ------                     Green Top (Gel)[[id number]]                                  Final result               Lavender Top[[id number]]                                      Final result               Gold Top - SST[[id number]]                                   Final result               Light Blue Top[[id number]]                                   Final result                 Please view results for these tests on the individual orders.   TSH   T4, FREE   CBC AND DIFFERENTIAL    Narrative:     The following orders were created for panel order CBC & Differential.  Procedure                               Abnormality         Status                     ---------                               -----------         ------                     CBC Auto Differential[[id number]]        Abnormal            Final result                 Please view results for these tests on the individual orders.   GREEN TOP   LAVENDER TOP   GOLD TOP - SST   LIGHT BLUE TOP       EKG:   ECG 12 Lead ED Triage Standing Order; SOA   Preliminary Result   HEART RATE=68  bpm   RR Rzqudpmw=969  ms   SD Interval=  ms   P Horizontal Axis=  deg   P Front Axis=  deg   QRSD Nqirfblc=763  ms   QT Jlkwjypt=666  ms   WZmK=103  ms   QRS Axis=-57  deg   T Wave Axis=-58  deg   - ABNORMAL ECG -   Atrial fibrillation   Right bundle branch block   Inferior infarct, old   Date and Time of Study:2024-12-20 09:48:27          Meds given in ED:   Medications   sodium chloride 0.9 % flush 10 mL (has no administration in time range)       Imaging results:  CT Soft Tissue Neck Without Contrast    Result Date: 12/20/2024  1.  There is a large goiter narrowing the trachea. Clinical correlation is recommended. 2.  There is no evidence of a radiopaque foreign body. Further evaluation could be performed with endoscopy as indicated.  The above information was called to and discussed with Todd Arrington. There is narrowing of the upper trachea in the transverse dimension.   Radiation dose reduction techniques were utilized, including automated exposure control and exposure modulation based on body size.   This report was finalized on 12/20/2024  12:01 PM by Dr. Seun Hernandez M.D on Workstation: [id number]      XR Chest 1 View    Result Date: 2024   1. Stable chest. 2. Small bibasilar opacities with elevated right hemidiaphragm. Suspect subsegmental atelectasis. 3. Widening of the superior mediastinum, consistent with goiter as seen on CT.  This report was finalized on 2024 10:20 AM by Dr. Jameel Butler M.D on Workstation: [id number]       Ambulatory status:   - with assist     Social issues:   Social History     Socioeconomic History    Marital status:     Number of children: 10    Years of education: High School   Tobacco Use    Smoking status: Former     Current packs/day: 0.00     Average packs/day: 1.5 packs/day for 12.0 years (18.0 ttl pk-yrs)     Types: Cigarettes     Start date:      Quit date:      Years since quittin.0     Passive exposure: Past    Smokeless tobacco: Never    Tobacco comments:     QUIT SMOKING    Vaping Use    Vaping status: Never Used   Substance and Sexual Activity    Alcohol use: No     Comment: caffeine - decaf coffee    Drug use: Never    Sexual activity: Defer       Peripheral Neurovascular  Peripheral Neurovascular (Adult)  Peripheral Neurovascular WDL: .WDL except (pt states edema is normal for her, has been taking her lasix)    Neuro Cognitive  Neuro Cognitive (Adult)  Cognitive/Neuro/Behavioral WDL: WDL    Learning  Learning Assessment  Learning Readiness and Ability: no barriers identified ([place])    Respiratory  Respiratory WDL  Respiratory WDL: .WDL except, cough  Cough Frequency: frequent  Cough Type: fair  Breath Sounds  All Lung Fields Breath Sounds: All Fields  All Lung Fields Breath Sounds: Anterior:, clear, equal bilaterally    Abdominal Pain       Pain Assessments  Pain (Adult)  (0-10) Pain Rating: Rest: 0    NIH Stroke Scale       Brooke Gallardo RN  24 14:12 EST

## 2024-12-20 NOTE — PLAN OF CARE
Goal Outcome Evaluation:  Plan of Care Reviewed With: patient        Progress: no change  Outcome Evaluation: Received pt from ER, admitted w/ COVID 19 and thyroid goiter, A/O, continent of bowel and bladder, VSS, edema to BLE, latex and falls precautions

## 2024-12-20 NOTE — ED PROVIDER NOTES
MD ATTESTATION NOTE    The BRENT and I have discussed this patient's history, physical exam, MDM, and treatment plan.  I have reviewed the documentation and personally had a face to face interaction with the patient. I affirm the documentation and agree with the treatment and plan.  The attached note describes my personal findings.      I provided a substantive portion of the medical decision making.        Brief HPI: Patient with history of COPD, diastolic heart failure, presents with complaint of coughing and choking beginning this morning.  She feels like something stuck in her throat that she cannot get up.  The symptoms just began today.  She denies fever or chest pain.    PHYSICAL EXAM  ED Triage Vitals   Temp Heart Rate Resp BP SpO2   12/20/24 0933 12/20/24 0933 12/20/24 0933 12/20/24 0933 12/20/24 0933   98 °F (36.7 °C) 60 19 133/82 100 %      Temp src Heart Rate Source Patient Position BP Location FiO2 (%)   -- 12/20/24 1101 -- -- --    Monitor            GENERAL:  No acute distress  HENT: nares patent, neck is supple anteriorly without appreciable mass, trachea is midline, no intraoral edema  EYES: no scleral icterus  CV: regular rhythm, normal rate  RESPIRATORY: normal effort, no wheezing or stridor  MUSCULOSKELETAL: no deformity  NEURO: alert, moves all extremities, follows commands  PSYCH:  calm, cooperative  SKIN: warm, dry    Vital signs and nursing notes reviewed.        Medical Decision Making:  ED Course as of 12/20/24 1401   Fri Dec 20, 2024   1044 Per my independent interpretation of the chest x-ray, there is no obvious pneumothorax. [DC]   1047 proBNP: 1,449.0 [DC]   1047 HS Troponin T(!): 29 [DC]   1254 COVID19(!!): Detected [DC]   1306 COVID19(!!): Detected [JR]   1342 Patient reassessed.  She is not hypoxic and she has no stridor.  I explained the concerning findings regarding her large thyroid goiter with some mass effect upon her trachea, as well as COVID-19 infection.  I recommended  admission for continued monitoring and involvement of ENT.  Patient and family are agreeable plan. [JR]   1344 EKG          EKG time: 9:48 AM  Rhythm/Rate: A-fib, 68  P waves and PA: N/A  QRS, axis: RBBB  ST and T waves: No acute ischemic changes    Interpreted Contemporaneously by me, independently viewed  Similar compared to prior 5/18/2024       [JR]   1358 Discussed with Dr. Jones, [facility] hospitalist, who agrees to admit to [facility] bed. [JR]      ED Course User Index  [DC] Luc Arrington, PA  [JR] Tony Castro MD         Final diagnoses:   COVID-19 virus infection   Thyroid goiter       ADMIT       Tony Castro MD  12/20/24 1401

## 2024-12-20 NOTE — H&P
HISTORY AND PHYSICAL   [facility]        Patient Identification:  Name: Brittany Villeda  Age: 89 y.o.  Sex: female  :  1935  MRN: [id number]                     Primary Care Physician: Mega Esparza MD    Chief Complaint: Cough.  Full feeling in throat.    History of Present Illness:   Pleasant 89-year-old female with a multitude of medical comorbidities notes a marked increase in her coughing this morning.  No fever sweats or chills associated with this.  She only feels short of breath when she is actively coughing.  She did note however a full feeling in her throat and a globus feeling with swallowing.  She does have a history of a thyroid goiter but she appears to be unaware of this.      Past Medical History:  Past Medical History:   Diagnosis Date    Abdominal aortic aneurysm     Anemia     Arrhythmia     Arthritis     Asthma     Bradycardia     CHF (congestive heart failure) 2023    Choledocholithiasis 2021    Colitis     Diastolic dysfunction     Essential hypertension 2016    GERD (gastroesophageal reflux disease)     Heart block     Hypertension     Hypertensive heart disease     Hyperthyroidism     REPORTS ENLARGED    Inguinal hernia     Kidney stone     Leukopenia     MGUS (monoclonal gammopathy of unknown significance)     Nephrolithiasis     Pacemaker     Paroxysmal atrial fibrillation     Peptic ulceration     Pressure ulcer     REPORTS ON COCCYX. INSTR TO NOTIFY DR BAIRES'S OFFICE    PSVT (paroxysmal supraventricular tachycardia)     Pulmonary hypertension     Rectal bleed     Sleep apnea     NO DEVICE    Subdural hematoma     Systemic hypertension     Trifascicular block     Ulcerative rectosigmoiditis without complication     Ventricular tachycardia     nonsustained     Past Surgical History:  Past Surgical History:   Procedure Laterality Date    BACK SURGERY      lumbar fusion    PAWAN HOLE Left 2021    Procedure: Left-sided pawan holes for evacuation  of subdural hematoma;  Surgeon: Gustavo Callaway MD;  Location: [facility] MAIN OR;  Service: Neurosurgery;  Laterality: Left;    CARDIAC ELECTROPHYSIOLOGY PROCEDURE N/A 11/07/2018    Procedure: Pacemaker DC new   [place];  Surgeon: Jesus Granda MD;  Location: [facility] CATH INVASIVE LOCATION;  Service: Cardiology    CHOLECYSTECTOMY      COLONOSCOPY  06/16/2014    colitis, cryptitis,  tics, NBIH, TA w/low grade dysplasia    COLONOSCOPY N/A 10/28/2019    Procedure: COLONOSCOPY WITH COLD AND HOT POLYPECTOMIES;  Surgeon: Micaela English MD;  Location: [facility] ENDOSCOPY;  Service: Gastroenterology    ENDOSCOPY N/A 05/13/2021    Procedure: ESOPHAGOGASTRODUODENOSCOPY with BX;  Surgeon: Stefan Mcfadden MD;  Location: [facility] ENDOSCOPY;  Service: Gastroenterology;  Laterality: N/A;  EPIGASTRIC PAIN  --DUODENAL ULCER, HEMORRHAGIC GASTRITIS, HIATAL HERNIA     ENDOSCOPY N/A 10/11/2022    Procedure: ESOPHAGOGASTRODUODENOSCOPY;  Surgeon: Micaela English MD;  Location: [facility] ENDOSCOPY;  Service: Gastroenterology;  Laterality: N/A;  PRE- MELENA  POST- ESOPHAGITIS    HEMORRHOIDECTOMY      HERNIA REPAIR      umbilical    HYSTERECTOMY      INGUINAL HERNIA REPAIR Left 9/1/2023    Procedure: INGUINAL HERNIA REPAIR LEFT;  Surgeon: Antonio Foster MD;  Location: [facility] OR OSC;  Service: General;  Laterality: Left;    JOINT REPLACEMENT Bilateral     KNEES    MYOMECTOMY      PACEMAKER IMPLANTATION      SHOULDER SURGERY      SIGMOIDOSCOPY N/A 11/02/2022    Procedure: SIGMOIDOSCOPY FLEXIBLE WITH COLD BIOPSIES AND ASPIRATE;  Surgeon: Micaela English MD;  Location: [facility] ENDOSCOPY;  Service: Gastroenterology;  Laterality: N/A;  PRE- RECTAL BLEEDING  POST- HEMORRHOIDS, DIVERTICULOSIS, COLITIS    SINUS SURGERY      TOE NAIL AMPUTATION  03/04/2019    TONSILLECTOMY        Home Meds:  (Not in a hospital admission)      Allergies:  Allergies   Allergen Reactions    Codeine Hallucinations     Tolerates hydromorphone     Amitriptyline Rash    Amoxicillin-Pot Clavulanate Rash    Aspirin Unknown - Low Severity     Patient doesn't know why    Carisoprodol-Aspirin-Codeine Palpitations    Iodinated Contrast Media Rash    Latex Rash    Naproxen Rash    Nsaids Unknown - Low Severity     UNSURE OF REACTION    Soma Compound With Codeine [Carisoprodol-Aspirin-Codeine] Rash    Sulfa Antibiotics Rash    Tramadol Palpitations     heart races      Immunizations:  Immunization History   Administered Date(s) Administered    FLUAD TRI 65YR+ 09/10/2019    Fluad Quad 65+ 09/10/2019, 2020    Fluzone High-Dose 65+YRS 10/03/2017, 10/02/2018, 09/10/2019    Fluzone High-Dose 65+yrs 10/22/2021, 2022, 2023    Hepatitis A 2018, 2018    Pneumococcal Conjugate 13-Valent (PCV13) 2017    Pneumococcal Conjugate 20-Valent (PCV20) 2023    Pneumococcal Polysaccharide (PPSV23) 2018    Zostavax 2011     Social History:   Social History     Social History Narrative    Not on file     Social History     Tobacco Use    Smoking status: Former     Current packs/day: 0.00     Average packs/day: 1.5 packs/day for 12.0 years (18.0 ttl pk-yrs)     Types: Cigarettes     Start date:      Quit date: 1965     Years since quittin.0     Passive exposure: Past    Smokeless tobacco: Never    Tobacco comments:     QUIT SMOKING    Substance Use Topics    Alcohol use: No     Comment: caffeine - decaf coffee     Family History:  Family History   Problem Relation Age of Onset    Diabetes Mother     Breast cancer Sister     Kidney cancer Sister     Heart disease Sister     Prostate cancer Brother     Prostate cancer Brother     Prostate cancer Brother     Malig Hyperthermia Neg Hx         Review of Systems  Review of Systems   Constitutional: Negative.    HENT: Negative.     Eyes: Negative.    Respiratory:          As per history of present illness.   Cardiovascular: Negative.    Gastrointestinal:         As per history of  present illness.   Endocrine: Negative.    Genitourinary: Negative.    Musculoskeletal: Negative.    Skin: Negative.    Allergic/Immunologic: Negative.    Neurological: Negative.    Hematological: Negative.    Psychiatric/Behavioral: Negative.         Objective:  T Max 24 hrs: Temp (24hrs), Av °F (36.7 °C), Min:98 °F (36.7 °C), Max:98 °F (36.7 °C)    Vitals Ranges:   Temp:  [98 °F (36.7 °C)] 98 °F (36.7 °C)  Heart Rate:  [60] 60  Resp:  [19-20] 20  BP: (120-133)/(69-82) 120/73      Exam:  Physical Exam  Constitutional:       General: She is not in acute distress.     Appearance: Normal appearance. She is normal weight. She is not ill-appearing, toxic-appearing or diaphoretic.   HENT:      Head: Normocephalic and atraumatic.      Right Ear: External ear normal.      Left Ear: External ear normal.      Nose: Nose normal.   Eyes:      General: No scleral icterus.        Right eye: No discharge.         Left eye: No discharge.      Extraocular Movements: Extraocular movements intact.      Conjunctiva/sclera: Conjunctivae normal.   Cardiovascular:      Rate and Rhythm: Normal rate. Rhythm irregular.   Pulmonary:      Effort: Pulmonary effort is normal. No respiratory distress.      Breath sounds: Normal breath sounds. No stridor. Rhonchi present. No wheezing or rales.      Comments: Rare rhonchi.  Good air movement.  Chest:      Chest wall: No tenderness.   Abdominal:      General: Abdomen is flat. Bowel sounds are normal. There is no distension.      Palpations: Abdomen is soft. There is no mass.      Tenderness: There is no abdominal tenderness. There is no guarding or rebound.   Musculoskeletal:      Cervical back: Neck supple.      Right lower leg: Edema present.      Left lower leg: Edema present.      Comments: Nonpitting edema pedal and above the ankles.  Chronic.   Skin:     General: Skin is warm and dry.   Neurological:      General: No focal deficit present.      Mental Status: She is alert and oriented to  person, place, and time.   Psychiatric:         Mood and Affect: Mood normal.         Behavior: Behavior normal.         Thought Content: Thought content normal.         Judgment: Judgment normal.         Data Review:  All labs and radiology reviewed.    Assessment:  COVID-19: Multiple comorbidities.  No hypoxia.  Initiate Paxlovid.  Thyroid goiter: Euthyroid.  ENT consult for outpatient follow-up.  Paroxysmal atrial fibrillation: Continue rate control.  No anticoagulation secondary to history of intracranial bleed.  Hypertension: Continue home meds.  Monitor.  COPD: No evidence of exacerbation.  Continue home meds.  Monitor.  Ulcerative rectosigmoiditis: Presently asymptomatic.  Continue home meds.  Macrocytosis: Chronic.  Recheck B12, folate.  Peripheral vas disease:  HUGO: May use home CPAP.  Mild hyperparathyroidism: Calcium presently normal.  Continue outpatient follow-up.  Mild hyperbilirubinemia: Likely Gilbert's.  Check fractionated bilirubin.  Chronic lower extremity edema: Stable.        Plan:  Please see above.  Discussed with patient.  Discussed with ER provider.  Likely discharge tomorrow.    Vargas Jones MD  12/20/2024  15:10 EST    EMR Dragon/Transcription disclaimer:   Much of this encounter note is an electronic transcription/translation of spoken language to printed text. The electronic translation of spoken language may permit erroneous, or at times, nonsensical words or phrases to be inadvertently transcribed; Although I have reviewed the note for such errors, some may still exist.

## 2024-12-21 ENCOUNTER — READMISSION MANAGEMENT (OUTPATIENT)
Dept: CALL CENTER | Facility: HOSPITAL | Age: 89
End: 2024-12-21
Payer: MEDICARE

## 2024-12-21 VITALS
HEIGHT: 64 IN | TEMPERATURE: 97.2 F | HEART RATE: 60 BPM | OXYGEN SATURATION: 96 % | SYSTOLIC BLOOD PRESSURE: 96 MMHG | RESPIRATION RATE: 16 BRPM | DIASTOLIC BLOOD PRESSURE: 63 MMHG | WEIGHT: 165 LBS | BODY MASS INDEX: 28.17 KG/M2

## 2024-12-21 LAB
ALBUMIN SERPL-MCNC: 3.3 G/DL (ref 3.5–5.2)
ALBUMIN/GLOB SERPL: 1.1 G/DL
ALP SERPL-CCNC: 59 U/L (ref 39–117)
ALT SERPL W P-5'-P-CCNC: 5 U/L (ref 1–33)
ANION GAP SERPL CALCULATED.3IONS-SCNC: 10.4 MMOL/L (ref 5–15)
AST SERPL-CCNC: 16 U/L (ref 1–32)
BILIRUB CONJ SERPL-MCNC: 0.3 MG/DL (ref 0–0.3)
BILIRUB SERPL-MCNC: 1.6 MG/DL (ref 0–1.2)
BUN SERPL-MCNC: 18 MG/DL (ref 8–23)
BUN/CREAT SERPL: 19.4 (ref 7–25)
CALCIUM SPEC-SCNC: 9.6 MG/DL (ref 8.6–10.5)
CHLORIDE SERPL-SCNC: 101 MMOL/L (ref 98–107)
CO2 SERPL-SCNC: 24.6 MMOL/L (ref 22–29)
CREAT SERPL-MCNC: 0.93 MG/DL (ref 0.57–1)
EGFRCR SERPLBLD CKD-EPI 2021: 58.9 ML/MIN/1.73
FOLATE SERPL-MCNC: 8.62 NG/ML (ref 4.78–24.2)
GLOBULIN UR ELPH-MCNC: 3.1 GM/DL
GLUCOSE SERPL-MCNC: 83 MG/DL (ref 65–99)
MAGNESIUM SERPL-MCNC: 1.5 MG/DL (ref 1.6–2.4)
PHOSPHATE SERPL-MCNC: 2.5 MG/DL (ref 2.5–4.5)
POTASSIUM SERPL-SCNC: 4.4 MMOL/L (ref 3.5–5.2)
PROT SERPL-MCNC: 6.4 G/DL (ref 6–8.5)
PTH-INTACT SERPL-MCNC: 144 PG/ML (ref 15–65)
SODIUM SERPL-SCNC: 136 MMOL/L (ref 136–145)
VIT B12 BLD-MCNC: 1964 PG/ML (ref 211–946)

## 2024-12-21 PROCEDURE — 83735 ASSAY OF MAGNESIUM: CPT | Performed by: HOSPITALIST

## 2024-12-21 PROCEDURE — 82248 BILIRUBIN DIRECT: CPT | Performed by: HOSPITALIST

## 2024-12-21 PROCEDURE — 80053 COMPREHEN METABOLIC PANEL: CPT | Performed by: HOSPITALIST

## 2024-12-21 PROCEDURE — 82607 VITAMIN B-12: CPT | Performed by: HOSPITALIST

## 2024-12-21 PROCEDURE — 83970 ASSAY OF PARATHORMONE: CPT | Performed by: HOSPITALIST

## 2024-12-21 PROCEDURE — 84100 ASSAY OF PHOSPHORUS: CPT | Performed by: HOSPITALIST

## 2024-12-21 PROCEDURE — 82746 ASSAY OF FOLIC ACID SERUM: CPT | Performed by: HOSPITALIST

## 2024-12-21 PROCEDURE — G0378 HOSPITAL OBSERVATION PER HR: HCPCS

## 2024-12-21 RX ORDER — ACETAMINOPHEN 500 MG
500 TABLET ORAL 2 TIMES DAILY
Start: 2024-12-21

## 2024-12-21 RX ADMIN — PANTOPRAZOLE SODIUM 40 MG: 40 TABLET, DELAYED RELEASE ORAL at 10:21

## 2024-12-21 RX ADMIN — FUROSEMIDE 20 MG: 20 TABLET ORAL at 10:20

## 2024-12-21 RX ADMIN — Medication 12.5 MG: at 10:21

## 2024-12-21 RX ADMIN — MESALAMINE 1.5 G: 0.38 CAPSULE, EXTENDED RELEASE ORAL at 10:20

## 2024-12-21 RX ADMIN — Medication 1000 MCG: at 10:20

## 2024-12-21 RX ADMIN — MAGNESIUM OXIDE 400 MG (241.3 MG MAGNESIUM) TABLET 200 MG: TABLET at 10:20

## 2024-12-21 NOTE — NURSING NOTE
"On first med pass, patient family refused the dose of nirmatrelvir and ritonavir stating \"The family would like for her to not take the covid medication and just naturally fight it off.\" And after the vitals were obtained the pt had a temp. Of 101.1. I offered two tylenol (650 mg) and the daughter refused the dose, She stated that she was on a family chat informing other family members of the situation and would like to hold off now and re-evaluate when vitals were obtained again.   I returned to nursing station and Amina the other daughter and caretaker, called and asked about the covid medication, (her sister had  in  from covid and they have concerns IV vs PO), I informed her that I could not answer. I then told her the reason for the tylenol and she told me to give the tylenol and would likely call back about the covid medication.  "

## 2024-12-21 NOTE — NURSING NOTE
Dtr called out stating mother was having difficulty breathing. Arrived to room found HR at 60, pulse Ox at 94. Pt stated she couldn't breath/choking. Elevated head of bed, checked vitals, calmed pt, believe she became choked up trying to clear throat. Offered to call resp. about poss breathing treatment, pt declined. Placed NC with 2LO2 for comfort, after a couple minutes pt calmed/relaxed. Will continue to monitor.

## 2024-12-21 NOTE — OUTREACH NOTE
Prep Survey      Flowsheet Row Responses   [facility] patient discharged from? [facility]   Is LACE score < 7 ? No   Eligibility [facility]   Date of Admission 12/20/24   Date of Discharge 12/21/24   Discharge Disposition Home or Self Care   Discharge diagnosis COVID-19 virus infection   Does the patient have one of the following disease processes/diagnoses(primary or secondary)? Other   Does the patient have Home health ordered? No   Is there a DME ordered? No   Comments regarding appointments Ambulatory Referral to ENT (Otolaryngology)   Prep survey completed? Yes            Nicole GALLO - Registered Nurse

## 2024-12-21 NOTE — CONSULTS
[facility]  ENT CONSULT NOTE  2024    Patient Identification:  Name: Brittany Villeda  Age: 89 y.o.  Sex: female  :  1935  MRN: [id number]                     Date of Admission: 2024            HPI:   89-year-old female with a multitude comorbidities notes a marked increase in her coughing yesterday morning. No fever sweats or chills associated with this. She only feels short of breath when she is actively coughing. She did note however a foreign body sensation in her throat, especially with swallowing. She does have a history of a thyroid goiter. CT scan performed shows some narrowing of the trachea secondary to the goiter but no evidence of critical obstruction. The patient has an active Covid-19 infection. No stridor or active dyspnea while examining the patient at bedside.     ROS:  Review of Systems - History obtained from the patient and family member at bedside    HISTORY      Past Medical History:   Diagnosis Date    Abdominal aortic aneurysm     Anemia     Arrhythmia     Arthritis     Asthma     Bradycardia     CHF (congestive heart failure) 2023    Choledocholithiasis 2021    Colitis     Diastolic dysfunction     Essential hypertension 2016    GERD (gastroesophageal reflux disease)     Heart block     Hypertension     Hypertensive heart disease     Hyperthyroidism     REPORTS ENLARGED    Inguinal hernia     Kidney stone     Leukopenia     MGUS (monoclonal gammopathy of unknown significance)     Nephrolithiasis     Pacemaker     Paroxysmal atrial fibrillation     Peptic ulceration     Pressure ulcer     REPORTS ON COCCYX. INSTR TO NOTIFY DR BAIRES'S OFFICE    PSVT (paroxysmal supraventricular tachycardia)     Pulmonary hypertension     Rectal bleed     Sleep apnea     NO DEVICE    Subdural hematoma     Systemic hypertension     Trifascicular block     Ulcerative rectosigmoiditis without complication     Ventricular tachycardia     nonsustained        Past  Surgical History:   Procedure Laterality Date    BACK SURGERY      lumbar fusion    DUSTY HOLE Left 08/08/2021    Procedure: Left-sided dusty holes for evacuation of subdural hematoma;  Surgeon: Gustavo Callaway MD;  Location: [facility] MAIN OR;  Service: Neurosurgery;  Laterality: Left;    CARDIAC ELECTROPHYSIOLOGY PROCEDURE N/A 11/07/2018    Procedure: Pacemaker DC new   [place];  Surgeon: Jesus Granda MD;  Location: [facility] CATH INVASIVE LOCATION;  Service: Cardiology    CHOLECYSTECTOMY      COLONOSCOPY  06/16/2014    colitis, cryptitis,  tics, NBIH, TA w/low grade dysplasia    COLONOSCOPY N/A 10/28/2019    Procedure: COLONOSCOPY WITH COLD AND HOT POLYPECTOMIES;  Surgeon: Micaela English MD;  Location: [facility] ENDOSCOPY;  Service: Gastroenterology    ENDOSCOPY N/A 05/13/2021    Procedure: ESOPHAGOGASTRODUODENOSCOPY with BX;  Surgeon: Stefan Mcfadden MD;  Location: [facility] ENDOSCOPY;  Service: Gastroenterology;  Laterality: N/A;  EPIGASTRIC PAIN  --DUODENAL ULCER, HEMORRHAGIC GASTRITIS, HIATAL HERNIA     ENDOSCOPY N/A 10/11/2022    Procedure: ESOPHAGOGASTRODUODENOSCOPY;  Surgeon: Micaela English MD;  Location: [facility] ENDOSCOPY;  Service: Gastroenterology;  Laterality: N/A;  PRE- MELENA  POST- ESOPHAGITIS    HEMORRHOIDECTOMY      HERNIA REPAIR      umbilical    HYSTERECTOMY      INGUINAL HERNIA REPAIR Left 9/1/2023    Procedure: INGUINAL HERNIA REPAIR LEFT;  Surgeon: Antonio Foster MD;  Location: [facility] OR OSC;  Service: General;  Laterality: Left;    JOINT REPLACEMENT Bilateral     KNEES    MYOMECTOMY      PACEMAKER IMPLANTATION      SHOULDER SURGERY      SIGMOIDOSCOPY N/A 11/02/2022    Procedure: SIGMOIDOSCOPY FLEXIBLE WITH COLD BIOPSIES AND ASPIRATE;  Surgeon: Micaela English MD;  Location: [facility] ENDOSCOPY;  Service: Gastroenterology;  Laterality: N/A;  PRE- RECTAL BLEEDING  POST- HEMORRHOIDS, DIVERTICULOSIS, COLITIS    SINUS SURGERY      TOE NAIL AMPUTATION  03/04/2019    TONSILLECTOMY           Social History     Socioeconomic History    Marital status:     Number of children: 10    Years of education: High School   Tobacco Use    Smoking status: Former     Current packs/day: 0.00     Average packs/day: 1.5 packs/day for 12.0 years (18.0 ttl pk-yrs)     Types: Cigarettes     Start date:      Quit date:      Years since quittin.0     Passive exposure: Past    Smokeless tobacco: Never    Tobacco comments:     QUIT SMOKING    Vaping Use    Vaping status: Never Used   Substance and Sexual Activity    Alcohol use: No     Comment: caffeine - decaf coffee    Drug use: Never    Sexual activity: Defer        Medications Prior to Admission   Medication Sig Dispense Refill Last Dose/Taking    docusate sodium (COLACE) 100 MG capsule Take 1 capsule by mouth 2 (Two) Times a Day As Needed for Constipation. 60 capsule 5 2024    ELDERBERRY PO Take 2 tablets by mouth Daily.   2024    furosemide (LASIX) 40 MG tablet Take 0.5 tablets by mouth Daily. Taking 20mg 90 tablet 1 2024    lidocaine (XYLOCAINE) 5 % ointment APPLY 1 A THIN LAYER TOPICALLY DAILY AS DIRECTED 35.44 g 5 2024    Magnesium Oxide -Mg Supplement 400 (240 Mg) MG tablet TAKE 1 TABLET BY MOUTH DAILY 90 tablet 1 2024    mesalamine (CANASA) 1000 MG suppository UNWRAP AND INSERT 1 SUPPOSITORY RECTALLY EVERY NIGHT AT BEDTIME 30 suppository 0 2024    pantoprazole (PROTONIX) 40 MG EC tablet TAKE 1 TABLET BY MOUTH DAILY 90 tablet 1 2024    spironolactone (ALDACTONE) 25 MG tablet Take 0.5 tablets by mouth Daily.   2024    vitamin B-12 (CYANOCOBALAMIN) 1000 MCG tablet TAKE 1 TABLET BY MOUTH DAILY 90 tablet 1 2024    [DISCONTINUED] acetaminophen (TYLENOL) 500 MG tablet Take 1 tablet by mouth Every 6 (Six) Hours As Needed for Mild Pain . (Patient taking differently: Take 1 tablet by mouth 2 (Two) Times a Day.)   Patient Taking Differently    Lidocaine 50mg suppository Insert 1 suppository  into the rectum Daily As Needed (rectal pain). 14 suppository 0 More than a month    mesalamine (APRISO) 0.375 g 24 hr capsule TAKE FOUR CAPSULES BY MOUTH DAILY 360 capsule 2     valACYclovir (VALTREX) 1000 MG tablet Take 1 tablet by mouth Daily.   More than a month        Allergies   Allergen Reactions    Codeine Hallucinations     Tolerates hydromorphone    Amitriptyline Rash    Amoxicillin-Pot Clavulanate Rash    Aspirin Unknown - Low Severity     Patient doesn't know why    Carisoprodol-Aspirin-Codeine Palpitations    Iodinated Contrast Media Rash    Latex Rash    Naproxen Rash    Nsaids Unknown - Low Severity     UNSURE OF REACTION    Soma Compound With Codeine [Carisoprodol-Aspirin-Codeine] Rash    Sulfa Antibiotics Rash    Tramadol Palpitations     heart races         Immunization History   Administered Date(s) Administered    FLUAD TRI 65YR+ 09/10/2019    Fluad Quad 65+ 09/10/2019, 09/09/2020    Fluzone High-Dose 65+YRS 10/03/2017, 10/02/2018, 09/10/2019    Fluzone High-Dose 65+yrs 10/22/2021, 11/02/2022, 11/13/2023    Hepatitis A 05/24/2018, 12/11/2018    Pneumococcal Conjugate 13-Valent (PCV13) 05/02/2017    Pneumococcal Conjugate 20-Valent (PCV20) 04/21/2023    Pneumococcal Polysaccharide (PPSV23) 06/13/2018    Zostavax 06/20/2011        Family History   Problem Relation Age of Onset    Diabetes Mother     Breast cancer Sister     Kidney cancer Sister     Heart disease Sister     Prostate cancer Brother     Prostate cancer Brother     Prostate cancer Brother     Malig Hyperthermia Neg Hx           Objective     PE:    Temp:  [97.2 °F (36.2 °C)-101.1 °F (38.4 °C)] 97.2 °F (36.2 °C)  Heart Rate:  [59-60] 60  Resp:  [16-20] 16  BP: ()/(48-73) 96/63   Body mass index is 28.32 kg/m².     General appearance: alert, well appearing, and in no distress.   Ability to Communicate: normal means of communication, clear voice, normal hearing   Ears - bilateral TM's and external ear canals normal.   Nasal exam -  normal and patent, no erythema, discharge or polyps.   Oropharyngeal exam - mucous membranes moist, pharynx normal without lesions.   Neck exam - supple, no significant adenopathy.   CVS exam: normal rate, regular rhythm, normal S1, S2, no murmurs, rubs, clicks or gallops.   Chest: clear to auscultation, no wheezes, rales or rhonchi, symmetric air entry.   Neurological exam reveals alert, oriented, normal speech, no focal findings or movement disorder noted.    DATA      MEDICATIONS     Current Facility-Administered Medications   Medication Dose Route Frequency Provider Last Rate Last Admin    acetaminophen (TYLENOL) tablet 650 mg  650 mg Oral Q4H PRN Vargas Jones MD        Or    acetaminophen (TYLENOL) 160 MG/5ML oral solution 650 mg  650 mg Oral Q4H PRN Vargas Jones MD        Or    acetaminophen (TYLENOL) suppository 650 mg  650 mg Rectal Q4H PRN Vargas Jones MD        sennosides-docusate (PERICOLACE) 8.6-50 MG per tablet 2 tablet  2 tablet Oral BID PRN Vargas Jones MD        And    polyethylene glycol (MIRALAX) packet 17 g  17 g Oral Daily PRN Vargas Jones MD        And    bisacodyl (DULCOLAX) EC tablet 5 mg  5 mg Oral Daily PRN Vargas Jones MD        And    bisacodyl (DULCOLAX) suppository 10 mg  10 mg Rectal Daily PRN Vargas Jones MD        furosemide (LASIX) tablet 20 mg  20 mg Oral Daily Vargas Jones MD   20 mg at 12/21/24 1020    magnesium oxide (MAG-OX) tablet 200 mg  200 mg Oral Daily Vargas Jones MD   200 mg at 12/21/24 1020    mesalamine (APRISO) 24 hr capsule 1.5 g  1.5 g Oral Daily Vargas Jones MD   1.5 g at 12/21/24 1020    mesalamine (CANASA) suppository 1,000 mg  1,000 mg Rectal Nightly Vargas Jones MD        nirmatrelvir and ritonavir (150mg/100mg) (PAXLOVID) 2 tablet pack- for renal adjustment  2 tablet Oral BID Vargas Jones MD        ondansetron ODT (ZOFRAN-ODT) disintegrating tablet 4 mg  4 mg Oral  Q6H PRN Vargas Jones MD        Or    ondansetron (ZOFRAN) injection 4 mg  4 mg Intravenous Q6H PRN Vargas Jones MD        pantoprazole (PROTONIX) EC tablet 40 mg  40 mg Oral Daily Vargas Jones MD   40 mg at 12/21/24 1021    sodium chloride 0.9 % flush 10 mL  10 mL Intravenous PRN Vargas Jones MD        sodium chloride 0.9 % flush 10 mL  10 mL Intravenous Q12H Vargas Jones MD        sodium chloride 0.9 % flush 10 mL  10 mL Intravenous PRN Vargas Jones MD        sodium chloride 0.9 % infusion 40 mL  40 mL Intravenous PRN Vargas Jones MD        spironolactone (ALDACTONE) half tablet 12.5 mg  12.5 mg Oral Daily Vargas Jones MD   12.5 mg at 12/21/24 1021    vitamin B-12 (CYANOCOBALAMIN) tablet 1,000 mcg  1,000 mcg Oral Daily Vargas Jones MD   1,000 mcg at 12/21/24 1020          Intake/Output Summary (Last 24 hours) at 12/21/2024 1041  Last data filed at 12/20/2024 2134  Gross per 24 hour   Intake 240 ml   Output --   Net 240 ml        Labs in chart were reviewed.        Imaging Results (All)       Procedure Component Value Units Date/Time    CT Soft Tissue Neck Without Contrast [[id number]] Collected: 12/20/24 1147     Updated: 12/20/24 1204    Narrative:      CT NECK WITHOUT CONTRAST     HISTORY: Choking sensation, evaluate for foreign body.     COMPARISON: CT chest 5/18/2024. No prior CT examination of the neck is  available for comparison.     FINDINGS: The sensitivity of the study is reduced as intravenous  contrast was not utilized. There is no evidence of a radiopaque foreign  body. The parotid and submandibular glands appear grossly unremarkable.  The thyroid is diffusely enlarged and extends into the superior  mediastinum consistent with a goiter. Small scattered calcifications are  appreciated. The left lobe of the thyroid measures 5.5 x 5.3 x 8.5 cm in  size. The right lobe of the thyroid measures approximately 5.5 x 4.0 x  7.3 cm in size.        Impression:      1.  There is a large goiter narrowing the trachea. Clinical correlation  is recommended.   2.  There is no evidence of a radiopaque foreign body. Further  evaluation could be performed with endoscopy as indicated.     The above information was called to and discussed with Todd Arrington.  There is narrowing of the upper trachea in the transverse dimension.        Radiation dose reduction techniques were utilized, including automated  exposure control and exposure modulation based on body size.        This report was finalized on 12/20/2024 12:01 PM by Dr. Seun Hernandez M.D on Workstation: [id number]       XR Chest 1 View [[id number]] Collected: 12/20/24 1018     Updated: 12/20/24 1023    Narrative:      XR CHEST 1 VW-        INDICATION: Shortness of air     COMPARISON: Chest radiograph May 18, 2024     TECHNIQUE: 1 view chest     FINDINGS:      Small bibasilar lung opacities. No effusions. Stable mediastinum.  Widening of the superior mediastinum. Elevated right hemidiaphragm.  Left-sided pacemaker with a right atrial and right ventricular lead.  Cholecystectomy clips.       Impression:         1. Stable chest.  2. Small bibasilar opacities with elevated right hemidiaphragm. Suspect  subsegmental atelectasis.  3. Widening of the superior mediastinum, consistent with goiter as seen  on CT.     This report was finalized on 12/20/2024 10:20 AM by Dr. Jameel Butler M.D on Workstation: [id number]                 ASSESSMENT        COVID-19 virus infection    Essential hypertension    History of atrial fibrillation    Paroxysmal atrial fibrillation    COPD (chronic obstructive pulmonary disease)    Chronic heart failure with preserved ejection fraction       Multinodular goiter - this appears to be longstanding. I reviewed the noncontrasted CT of the chest in 2019 and see no significant change in its size and affect of the trachea. I believe her current symptoms are likely secondary to the  viral infection and not secondary to the longstanding goiter.    PLAN        Continue with supportive care. If patient continues to have issues as an outpatient, follow up with the otol[facility]ology group of [facility]. I see no need to perform any further workup of the thyroid during this hospitalization.      Jameel Wilkerson MD  12/21/2024  10:41 EST

## 2024-12-21 NOTE — PLAN OF CARE
Goal Outcome Evaluation:  Plan of Care Reviewed With: patient, child           Outcome Evaluation: VSS, isolation maintained, refused covid medication, refused tylenol for elevated temp. see prior notes, daughter at bedside assisting with care, on 2LO2 for comfort, awaiting ENT consult for thyroid goiter later today, will continue to monitor

## 2024-12-21 NOTE — NURSING NOTE
Reviewed AVS with patient and family, all questions answered, patient and family verbalized understanding, iv removed.

## 2024-12-21 NOTE — DISCHARGE SUMMARY
Patient Name: Brittany Villeda  : 1935  MRN: [id number]    Date of Admission: 2024  Date of Discharge:  2024  Primary Care Physician: Mega Esparza MD      Chief Complaint:   Cough      Discharge Diagnoses     Active Hospital Problems    Diagnosis  POA    **COVID-19 virus infection [U07.1]  Yes    Chronic heart failure with preserved ejection fraction [I50.32]  Yes    COPD (chronic obstructive pulmonary disease) [J44.9]  Yes    Paroxysmal atrial fibrillation [I48.0]  Yes    History of atrial fibrillation [Z86.79]  Not Applicable    Essential hypertension [I10]  Yes      Resolved Hospital Problems   No resolved problems to display.        Hospital Course     Ms. Villeda is a 89 y.o. female with a history of PAF, COPD, hypertension, HUGO, MGUS, pulmonary hypertension, nontoxic goiter who presented to [facility] initially complaining of cough and feeling of fullness in throat.  Please see the admitting history and physical for further details.  She was found to have COVID-19 infection and was admitted to the hospital for further evaluation and treatment.  The patient was started on Paxlovid for COVID-19, she did not require remdesivir or steroids as she was not the patient was noted to be hypoxic on admission.  Family/patient are not interested in Paxlovid and this was discontinued.  ENT consulted for patient's fullness in her throat, ENT was able to review imaging from 2019 and compared to CT obtained at this admission, they do not see a significant change in the size or affect of the trachea.  Symptoms are likely secondary to viral infection/COVID-19.  Patient may follow-up with ENT as an outpatient with no further plans for inpatient evaluation.  The patient was monitored overnight, she is saturating well on room air and is stable for discharge.  She should follow-up with her PCP in a week for posthospital follow-up visit as well as ENT, an ambulatory referral was  placed.    Day of Discharge     Subjective:  Resting in bed, daughter at bedside.  She is eating breakfast without issues.    Physical Exam:  Temp:  [97.2 °F (36.2 °C)-101.1 °F (38.4 °C)] 97.2 °F (36.2 °C)  Heart Rate:  [59-60] 60  Resp:  [16-20] 16  BP: ()/(48-73) 96/63  Body mass index is 28.32 kg/m².  Physical Exam  Constitutional:       General: She is not in acute distress.     Appearance: Normal appearance.      Comments: Elderly, frail   Cardiovascular:      Rate and Rhythm: Normal rate and regular rhythm.      Pulses: Normal pulses.      Heart sounds: No murmur heard.  Pulmonary:      Effort: Pulmonary effort is normal. No respiratory distress.      Breath sounds: Normal breath sounds. No wheezing.   Abdominal:      General: Abdomen is flat. Bowel sounds are normal. There is no distension.      Palpations: Abdomen is soft.   Musculoskeletal:         General: No swelling or tenderness.      Right lower leg: No edema.      Left lower leg: No edema.   Skin:     General: Skin is warm and dry.   Neurological:      General: No focal deficit present.      Mental Status: She is alert and oriented to person, place, and time. Mental status is at baseline.         Consultants     Consult Orders (all) (From admission, onward)       Start     Ordered    12/20/24 1532  Inpatient ENT Consult  Once        Specialty:  Otolaryngology  Provider:  Franklyn Vazquez MD    12/20/24 1534    12/20/24 1341  [facility] (on-call MD unless specified) Details  Once        Specialty:  Hospitalist  Provider:  (Not yet assigned)    12/20/24 1341                  Procedures     Imaging Results (All)       Procedure Component Value Units Date/Time    CT Soft Tissue Neck Without Contrast [[id number]] Collected: 12/20/24 1147     Updated: 12/20/24 1204    Narrative:      CT NECK WITHOUT CONTRAST     HISTORY: Choking sensation, evaluate for foreign body.     COMPARISON: CT chest 5/18/2024. No prior CT examination of the neck is  available for  comparison.     FINDINGS: The sensitivity of the study is reduced as intravenous  contrast was not utilized. There is no evidence of a radiopaque foreign  body. The parotid and submandibular glands appear grossly unremarkable.  The thyroid is diffusely enlarged and extends into the superior  mediastinum consistent with a goiter. Small scattered calcifications are  appreciated. The left lobe of the thyroid measures 5.5 x 5.3 x 8.5 cm in  size. The right lobe of the thyroid measures approximately 5.5 x 4.0 x  7.3 cm in size.       Impression:      1.  There is a large goiter narrowing the trachea. Clinical correlation  is recommended.   2.  There is no evidence of a radiopaque foreign body. Further  evaluation could be performed with endoscopy as indicated.     The above information was called to and discussed with Todd Arrington.  There is narrowing of the upper trachea in the transverse dimension.        Radiation dose reduction techniques were utilized, including automated  exposure control and exposure modulation based on body size.        This report was finalized on 12/20/2024 12:01 PM by Dr. Seun Hernandez M.D on Workstation: [id number]       XR Chest 1 View [[id number]] Collected: 12/20/24 1018     Updated: 12/20/24 1023    Narrative:      XR CHEST 1 VW-        INDICATION: Shortness of air     COMPARISON: Chest radiograph May 18, 2024     TECHNIQUE: 1 view chest     FINDINGS:      Small bibasilar lung opacities. No effusions. Stable mediastinum.  Widening of the superior mediastinum. Elevated right hemidiaphragm.  Left-sided pacemaker with a right atrial and right ventricular lead.  Cholecystectomy clips.       Impression:         1. Stable chest.  2. Small bibasilar opacities with elevated right hemidiaphragm. Suspect  subsegmental atelectasis.  3. Widening of the superior mediastinum, consistent with goiter as seen  on CT.     This report was finalized on 12/20/2024 10:20 AM by Dr. Jameel Butler M.D on  "Workstation: [id number]               Pertinent Labs     Results from last 7 days   Lab Units 12/20/24  0955   WBC 10*3/mm3 4.04   HEMOGLOBIN g/dL 12.3   PLATELETS 10*3/mm3 105*     Results from last 7 days   Lab Units 12/21/24  0312 12/20/24  0955   SODIUM mmol/L 136 137   POTASSIUM mmol/L 4.4 4.4   CHLORIDE mmol/L 101 102   CO2 mmol/L 24.6 26.7   BUN mg/dL 18 21   CREATININE mg/dL 0.93 1.08*   GLUCOSE mg/dL 83 101*   Estimated Creatinine Clearance: 40.6 mL/min (by C-G formula based on SCr of 0.93 mg/dL).  Results from last 7 days   Lab Units 12/21/24  0312 12/20/24  0955   ALBUMIN g/dL 3.3* 3.9   BILIRUBIN mg/dL 1.6* 1.7*   ALK PHOS U/L 59 67   AST (SGOT) U/L 16 16   ALT (SGPT) U/L 5 5     Results from last 7 days   Lab Units 12/21/24  0312 12/20/24  0955   CALCIUM mg/dL 9.6 10.4   ALBUMIN g/dL 3.3* 3.9   MAGNESIUM mg/dL 1.5*  --    PHOSPHORUS mg/dL 2.5  --        Results from last 7 days   Lab Units 12/20/24  1141 12/20/24  0955   HSTROP T ng/L 20* 29*   PROBNP pg/mL  --  1,449.0           Invalid input(s): \"LDLCALC\"        Test Results Pending at Discharge       Discharge Details        Discharge Medications        Continue These Medications        Instructions Start Date   acetaminophen 500 MG tablet  Commonly known as: TYLENOL   500 mg, Oral, 2 Times Daily      docusate sodium 100 MG capsule  Commonly known as: COLACE   100 mg, Oral, 2 Times Daily PRN      ELDERBERRY PO   2 tablets, Daily      furosemide 40 MG tablet  Commonly known as: LASIX   20 mg, Oral, Daily, Taking 20mg      lidocaine 5 % ointment  Commonly known as: XYLOCAINE   APPLY 1 A THIN LAYER TOPICALLY DAILY AS DIRECTED      Lidocaine 50 MG rectal suppository   50 mg, Rectal, Daily PRN      Magnesium Oxide -Mg Supplement 400 (240 Mg) MG tablet   400 mg, Oral, Daily      mesalamine 1000 MG suppository  Commonly known as: CANASA   UNWRAP AND INSERT 1 SUPPOSITORY RECTALLY EVERY NIGHT AT BEDTIME      mesalamine 0.375 g 24 hr capsule  Commonly known " as: APRISO   1.5 g, Oral, Daily      pantoprazole 40 MG EC tablet  Commonly known as: PROTONIX   40 mg, Oral, Daily      spironolactone 25 MG tablet  Commonly known as: ALDACTONE   12.5 mg, Oral, Daily      valACYclovir 1000 MG tablet  Commonly known as: VALTREX   1,000 mg, Daily      vitamin B-12 1000 MCG tablet  Commonly known as: CYANOCOBALAMIN   1,000 mcg, Oral, Daily               Allergies   Allergen Reactions    Codeine Hallucinations     Tolerates hydromorphone    Amitriptyline Rash    Amoxicillin-Pot Clavulanate Rash    Aspirin Unknown - Low Severity     Patient doesn't know why    Carisoprodol-Aspirin-Codeine Palpitations    Iodinated Contrast Media Rash    Latex Rash    Naproxen Rash    Nsaids Unknown - Low Severity     UNSURE OF REACTION    Soma Compound With Codeine [Carisoprodol-Aspirin-Codeine] Rash    Sulfa Antibiotics Rash    Tramadol Palpitations     heart races          Discharge Disposition:  Home or Self Care    Discharge Diet:  Diet Order   Procedures    Diet: Regular/House, Cardiac; Healthy Heart (2-3 Na+); Fluid Consistency: Thin (IDDSI 0)       Discharge Activity:   Activity Instructions       Activity as Tolerated              CODE STATUS:    Code Status and Medical Interventions: CPR (Attempt to Resuscitate); Full Support   Ordered at: 12/20/24 1534     Code Status (Patient has no pulse and is not breathing):    CPR (Attempt to Resuscitate)     Medical Interventions (Patient has pulse or is breathing):    Full Support       Future Appointments   Date Time Provider Department Center   2/25/2025 11:30 AM Mark Win MD PhD  [facility]   3/17/2025 11:45 AM Mega Esparza MD [facility]   4/14/2025  9:30 AM Sydney Gilliam APRN [facility]   4/30/2025 12:20 PM VITALS ONLY [facility]  [facility]   4/30/2025 12:40 PM Carmelo Coates MD [facility]   10/13/2025  9:20 AM Mary Grace Culp MD [facility]     Additional Instructions for the Follow-ups  that You Need to Schedule       Discharge Follow-up with PCP   As directed       Currently Documented PCP:    Mega Esparza MD    PCP Phone Number:    [phone]     Follow Up Details: post-hospital follow up        Discharge Follow-up with Specified Provider: ENT recommending outpatient follow up   As directed      To: ENT recommending outpatient follow up               Follow-up Information       Mega Esparza MD .    Specialty: Family Medicine  Why: post-hospital follow up  Contact information:  [address]  [place] [address]  [phone]                             Additional Instructions for the Follow-ups that You Need to Schedule       Discharge Follow-up with PCP   As directed       Currently Documented PCP:    Mega Esparza MD    PCP Phone Number:    [phone]     Follow Up Details: post-hospital follow up        Discharge Follow-up with Specified Provider: ENT recommending outpatient follow up   As directed      To: ENT recommending outpatient follow up            Time Spent on Discharge:  I spent greater than 30 minutes on this discharge activity which included: face-to-face encounter with the patient, reviewing the data in the system, coordination of the care with the nursing staff as well as consultants, documentation, and entering orders.       Lavern Goncalves MD  [facility]  12/21/24  10:33 EST

## 2024-12-22 NOTE — CASE MANAGEMENT/SOCIAL WORK
Case Management Discharge Note      Final Note: home         Selected Continued Care - Discharged on 12/21/2024 Admission date: 12/20/2024 - Discharge disposition: Home or Self Care      Destination    No services have been selected for the patient.                Durable Medical Equipment    No services have been selected for the patient.                Dialysis/Infusion    No services have been selected for the patient.                Home Medical Care    No services have been selected for the patient.                Therapy    No services have been selected for the patient.                Community Resources    No services have been selected for the patient.                Community & DME    No services have been selected for the patient.                    Transportation Services  Private: Car    Final Discharge Disposition Code: 01 - home or self-care

## 2024-12-23 ENCOUNTER — TRANSITIONAL CARE MANAGEMENT TELEPHONE ENCOUNTER (OUTPATIENT)
Dept: CALL CENTER | Facility: HOSPITAL | Age: 89
End: 2024-12-23
Payer: MEDICARE

## 2024-12-23 NOTE — OUTREACH NOTE
Call Center TCM Note      Flowsheet Row Responses   [facility] patient discharged from? [place]   Does the patient have one of the following disease processes/diagnoses(primary or secondary)? Other   TCM attempt successful? Yes   Call start time 1620   Call end time 1626   Discharge diagnosis COVID-19 virus infection   Person spoke with today (if not patient) and relationship Daughter- Amina Abebe reviewed with patient/caregiver? Yes   Does the patient have all medications ordered at discharge? Yes   Is the patient taking all medications as directed (includes completed medication regime)? Yes   Comments No appt available- family/pt report they only see Dr. Esparza please advise.   Does the patient have an appointment with their PCP within 7-14 days of discharge? No appointments available   Nursing Interventions Routed TCM call to PCP office   Has home health visited the patient within 72 hours of discharge? N/A   Psychosocial issues? No   Did the patient receive a copy of their discharge instructions? Yes   Nursing interventions Reviewed instructions with patient   What is the patient's perception of their health status since discharge? Improving   Is the patient/caregiver able to teach back signs and symptoms related to disease process for when to call PCP? Yes   Is the patient/caregiver able to teach back signs and symptoms related to disease process for when to call 911? Yes   Is the patient/caregiver able to teach back the hierarchy of who to call/visit for symptoms/problems? PCP, Specialist, Home health nurse, Urgent Care, ED, 911 Yes   TCM call completed? Yes   Wrap up additional comments Daughter reports patient is doing well. No concerns or questions noted.   Call end time 1626   Would this patient benefit from a Referral to Amb Social Work? No   Is the patient interested in additional calls from an ambulatory ? No            Chely Roque RN    12/23/2024, 16:27 EST

## 2025-01-13 ENCOUNTER — OFFICE VISIT (OUTPATIENT)
Dept: FAMILY MEDICINE CLINIC | Facility: CLINIC | Age: OVER 89
End: 2025-01-13
Payer: MEDICARE

## 2025-01-13 VITALS
HEART RATE: 64 BPM | OXYGEN SATURATION: 99 % | WEIGHT: 169 LBS | TEMPERATURE: 98.2 F | SYSTOLIC BLOOD PRESSURE: 107 MMHG | BODY MASS INDEX: 28.85 KG/M2 | DIASTOLIC BLOOD PRESSURE: 54 MMHG | HEIGHT: 64 IN

## 2025-01-13 DIAGNOSIS — E04.2 NON-TOXIC MULTINODULAR GOITER: ICD-10-CM

## 2025-01-13 DIAGNOSIS — Z09 HOSPITAL DISCHARGE FOLLOW-UP: Primary | ICD-10-CM

## 2025-01-13 PROCEDURE — 99495 TRANSJ CARE MGMT MOD F2F 14D: CPT | Performed by: FAMILY MEDICINE

## 2025-01-13 PROCEDURE — 1111F DSCHRG MED/CURRENT MED MERGE: CPT | Performed by: FAMILY MEDICINE

## 2025-01-13 NOTE — PROGRESS NOTES
Transitional Care Follow Up Visit  Subjective     Brittany Villeda is a 89 y.o. female who presents for a transitional care management visit.    Within 48 business hours after discharge our office contacted her via telephone to coordinate her care and needs.      I reviewed and discussed the details of that call along with the discharge summary, hospital problems, inpatient lab results, inpatient diagnostic studies, and consultation reports with Brittany.     Current outpatient and discharge medications have been reconciled for the patient.  Reviewed by: Mega Esparza MD          12/21/2024     5:41 PM   Date of TCM Phone Call   [facility]   Date of Admission 12/20/2024   Date of Discharge 12/21/2024   Discharge Disposition Home or Self Care     Risk for Readmission (LACE) No data recorded    History of Present Illness   Course During Hospital Stay: Recent brief hospital stay.  Presented to [facility] emergency room complaining of cough.  Found to be positive for COVID-19.  Admitted for evaluation and treatment.  She was started on Paxlovid.  CT scan was done which revealed a large goiter.  Did not require remdesivir or steroids.  No significant hypoxia.  The Paxlovid was then discontinued after family and patient declined further treatment.  ENT was consulted because of a fullness in her throat.  She has a known goiter.  They compared the CT from 2019 and the CT from the admission.  No significant change in size or effect on the trachea.  Symptoms were thought to be secondary to viral infection.  She is here for follow-up.    She describes a slight cough that is not bothersome.  No trouble breathing or swallowing.  She feels good overall.  Reviewed her medication.  The medication list is reconciled.  She is taking Valtrex 1000 mg a day prescribed by dermatology for reported vaginitis vulvitis.  However her daughter states the herpes cultures were negative.  She also takes tacrolimus  "cream topically to the vulva.  I recommended they contact the dermatologist for further clarification.  May not need to be on the Valtrex.     The following portions of the patient's history were reviewed and updated as appropriate: allergies, current medications, past family history, past medical history, past social history, past surgical history, and problem list.    Review of Systems    Objective   /54   Pulse 64   Temp 98.2 °F (36.8 °C) (Temporal)   Ht 162.6 cm (64\")   Wt 76.7 kg (169 lb)   SpO2 99%   BMI 29.01 kg/m²   Physical Exam  Constitutional:       Appearance: Normal appearance.   Neck:      Comments: Thyroid exam is essentially unremarkable.  The goiter is retrosternal.  I did review the actual images from the CT scan of the soft tissue of the neck from the most recent hospitalization.  There is a mild narrowing of the trachea at the level of the goiter.  Cardiovascular:      Rate and Rhythm: Normal rate and regular rhythm.   Pulmonary:      Effort: Pulmonary effort is normal. No respiratory distress.      Breath sounds: Normal breath sounds. No stridor. No wheezing or rales.   Psychiatric:         Mood and Affect: Mood normal.         Assessment & Plan   Diagnoses and all orders for this visit:    1. Hospital discharge follow-up (Primary)    2. Non-toxic multinodular goiter      Hospital discharge follow-up for mild COVID-19 URI syndrome.  Medication list is reconciled.    Goiter.  Substernal.  Not likely causing significant respiratory distress or compromise.  According to the ENT consultation at the hospital, the mild airway stricture is unchanged compared to previous CT scan about 4 5 years ago.  At this time the family declines further ENT evaluation.  This is very reasonable.  The surgery would be very difficult for her, and possibly life-threatening.  If it appears the goiter is getting larger and causing airway compromise, we can try suppression with levothyroxine.  But the adverse " effects of the levothyroxine may be an issue also.  We will continue to monitor.

## 2025-01-18 ENCOUNTER — APPOINTMENT (OUTPATIENT)
Dept: GENERAL RADIOLOGY | Facility: HOSPITAL | Age: OVER 89
End: 2025-01-18
Payer: MEDICARE

## 2025-01-18 ENCOUNTER — HOSPITAL ENCOUNTER (OUTPATIENT)
Facility: HOSPITAL | Age: OVER 89
Setting detail: OBSERVATION
Discharge: HOME | End: 2025-01-20
Attending: EMERGENCY MEDICINE | Admitting: EMERGENCY MEDICINE
Payer: MEDICARE

## 2025-01-18 DIAGNOSIS — I50.9 ACUTE ON CHRONIC CONGESTIVE HEART FAILURE, UNSPECIFIED HEART FAILURE TYPE: Primary | ICD-10-CM

## 2025-01-18 DIAGNOSIS — J44.9 CHRONIC OBSTRUCTIVE PULMONARY DISEASE, UNSPECIFIED COPD TYPE: ICD-10-CM

## 2025-01-18 PROBLEM — R06.02 SHORTNESS OF BREATH: Status: ACTIVE | Noted: 2025-01-18

## 2025-01-18 LAB
ALBUMIN SERPL-MCNC: 3.4 G/DL (ref 3.5–5.2)
ALBUMIN/GLOB SERPL: 0.8 G/DL
ALP SERPL-CCNC: 66 U/L (ref 39–117)
ALT SERPL W P-5'-P-CCNC: 6 U/L (ref 1–33)
ANION GAP SERPL CALCULATED.3IONS-SCNC: 6.6 MMOL/L (ref 5–15)
ANISOCYTOSIS BLD QL: ABNORMAL
AST SERPL-CCNC: 12 U/L (ref 1–32)
B PARAPERT DNA SPEC QL NAA+PROBE: NOT DETECTED
B PERT DNA SPEC QL NAA+PROBE: NOT DETECTED
BASOPHILS # BLD MANUAL: 0 10*3/MM3 (ref 0–0.2)
BASOPHILS NFR BLD MANUAL: 0 % (ref 0–1.5)
BILIRUB SERPL-MCNC: 1 MG/DL (ref 0–1.2)
BUN SERPL-MCNC: 27 MG/DL (ref 8–23)
BUN/CREAT SERPL: 20.1 (ref 7–25)
C PNEUM DNA NPH QL NAA+NON-PROBE: NOT DETECTED
CALCIUM SPEC-SCNC: 10.4 MG/DL (ref 8.6–10.5)
CHLORIDE SERPL-SCNC: 104 MMOL/L (ref 98–107)
CO2 SERPL-SCNC: 27.4 MMOL/L (ref 22–29)
CREAT SERPL-MCNC: 1.34 MG/DL (ref 0.57–1)
D-LACTATE SERPL-SCNC: 1.2 MMOL/L (ref 0.5–2)
DEPRECATED RDW RBC AUTO: 44.6 FL (ref 37–54)
EGFRCR SERPLBLD CKD-EPI 2021: 38 ML/MIN/1.73
EOSINOPHIL # BLD MANUAL: 0 10*3/MM3 (ref 0–0.4)
EOSINOPHIL NFR BLD MANUAL: 0 % (ref 0.3–6.2)
ERYTHROCYTE [DISTWIDTH] IN BLOOD BY AUTOMATED COUNT: 11.4 % (ref 12.3–15.4)
FLUAV SUBTYP SPEC NAA+PROBE: NOT DETECTED
FLUBV RNA ISLT QL NAA+PROBE: NOT DETECTED
GEN 5 1HR TROPONIN T REFLEX: 20 NG/L
GLOBULIN UR ELPH-MCNC: 4.1 GM/DL
GLUCOSE SERPL-MCNC: 124 MG/DL (ref 65–99)
HADV DNA SPEC NAA+PROBE: NOT DETECTED
HCOV 229E RNA SPEC QL NAA+PROBE: NOT DETECTED
HCOV HKU1 RNA SPEC QL NAA+PROBE: NOT DETECTED
HCOV NL63 RNA SPEC QL NAA+PROBE: NOT DETECTED
HCOV OC43 RNA SPEC QL NAA+PROBE: NOT DETECTED
HCT VFR BLD AUTO: 38.7 % (ref 34–46.6)
HGB BLD-MCNC: 13 G/DL (ref 12–15.9)
HMPV RNA NPH QL NAA+NON-PROBE: NOT DETECTED
HPIV1 RNA ISLT QL NAA+PROBE: NOT DETECTED
HPIV2 RNA SPEC QL NAA+PROBE: NOT DETECTED
HPIV3 RNA NPH QL NAA+PROBE: NOT DETECTED
HPIV4 P GENE NPH QL NAA+PROBE: NOT DETECTED
LYMPHOCYTES # BLD MANUAL: 2.6 10*3/MM3 (ref 0.7–3.1)
LYMPHOCYTES NFR BLD MANUAL: 6.1 % (ref 5–12)
M PNEUMO IGG SER IA-ACNC: NOT DETECTED
MACROCYTES BLD QL SMEAR: ABNORMAL
MCH RBC QN AUTO: 36.3 PG (ref 26.6–33)
MCHC RBC AUTO-ENTMCNC: 33.6 G/DL (ref 31.5–35.7)
MCV RBC AUTO: 108.1 FL (ref 79–97)
MONOCYTES # BLD: 0.23 10*3/MM3 (ref 0.1–0.9)
NEUTROPHILS # BLD AUTO: 0.87 10*3/MM3 (ref 1.7–7)
NEUTROPHILS NFR BLD MANUAL: 23.5 % (ref 42.7–76)
NT-PROBNP SERPL-MCNC: 1025 PG/ML (ref 0–1800)
PLAT MORPH BLD: NORMAL
PLATELET # BLD AUTO: 106 10*3/MM3 (ref 140–450)
PMV BLD AUTO: 9.1 FL (ref 6–12)
POLYCHROMASIA BLD QL SMEAR: ABNORMAL
POTASSIUM SERPL-SCNC: 5.5 MMOL/L (ref 3.5–5.2)
PROCALCITONIN SERPL-MCNC: 0.08 NG/ML (ref 0–0.25)
PROT SERPL-MCNC: 7.5 G/DL (ref 6–8.5)
RBC # BLD AUTO: 3.58 10*6/MM3 (ref 3.77–5.28)
RHINOVIRUS RNA SPEC NAA+PROBE: NOT DETECTED
RSV RNA NPH QL NAA+NON-PROBE: NOT DETECTED
SARS-COV-2 RNA NPH QL NAA+NON-PROBE: NOT DETECTED
SODIUM SERPL-SCNC: 138 MMOL/L (ref 136–145)
TROPONIN T % DELTA: -13
TROPONIN T NUMERIC DELTA: -3 NG/L
TROPONIN T SERPL HS-MCNC: 23 NG/L
VARIANT LYMPHS NFR BLD MANUAL: 17.3 % (ref 0–5)
VARIANT LYMPHS NFR BLD MANUAL: 53.1 % (ref 19.6–45.3)
WBC MORPH BLD: NORMAL
WBC NRBC COR # BLD AUTO: 3.69 10*3/MM3 (ref 3.4–10.8)

## 2025-01-18 PROCEDURE — G0378 HOSPITAL OBSERVATION PER HR: HCPCS

## 2025-01-18 PROCEDURE — 93010 ELECTROCARDIOGRAM REPORT: CPT | Performed by: STUDENT IN AN ORGANIZED HEALTH CARE EDUCATION/TRAINING PROGRAM

## 2025-01-18 PROCEDURE — 83880 ASSAY OF NATRIURETIC PEPTIDE: CPT | Performed by: EMERGENCY MEDICINE

## 2025-01-18 PROCEDURE — 93005 ELECTROCARDIOGRAM TRACING: CPT | Performed by: EMERGENCY MEDICINE

## 2025-01-18 PROCEDURE — 36415 COLL VENOUS BLD VENIPUNCTURE: CPT

## 2025-01-18 PROCEDURE — 84145 PROCALCITONIN (PCT): CPT | Performed by: EMERGENCY MEDICINE

## 2025-01-18 PROCEDURE — 80053 COMPREHEN METABOLIC PANEL: CPT | Performed by: EMERGENCY MEDICINE

## 2025-01-18 PROCEDURE — 84484 ASSAY OF TROPONIN QUANT: CPT | Performed by: EMERGENCY MEDICINE

## 2025-01-18 PROCEDURE — 96376 TX/PRO/DX INJ SAME DRUG ADON: CPT

## 2025-01-18 PROCEDURE — 85025 COMPLETE CBC W/AUTO DIFF WBC: CPT | Performed by: EMERGENCY MEDICINE

## 2025-01-18 PROCEDURE — 96374 THER/PROPH/DIAG INJ IV PUSH: CPT

## 2025-01-18 PROCEDURE — 85007 BL SMEAR W/DIFF WBC COUNT: CPT | Performed by: EMERGENCY MEDICINE

## 2025-01-18 PROCEDURE — 99285 EMERGENCY DEPT VISIT HI MDM: CPT

## 2025-01-18 PROCEDURE — 71045 X-RAY EXAM CHEST 1 VIEW: CPT

## 2025-01-18 PROCEDURE — 0202U NFCT DS 22 TRGT SARS-COV-2: CPT | Performed by: EMERGENCY MEDICINE

## 2025-01-18 PROCEDURE — 25010000002 FUROSEMIDE PER 20 MG: Performed by: EMERGENCY MEDICINE

## 2025-01-18 PROCEDURE — 83605 ASSAY OF LACTIC ACID: CPT | Performed by: EMERGENCY MEDICINE

## 2025-01-18 RX ORDER — FUROSEMIDE 10 MG/ML
40 INJECTION INTRAMUSCULAR; INTRAVENOUS ONCE
Status: COMPLETED | OUTPATIENT
Start: 2025-01-18 | End: 2025-01-18

## 2025-01-18 RX ORDER — SODIUM CHLORIDE 0.9 % (FLUSH) 0.9 %
10 SYRINGE (ML) INJECTION AS NEEDED
Status: DISCONTINUED | OUTPATIENT
Start: 2025-01-18 | End: 2025-01-20 | Stop reason: HOSPADM

## 2025-01-18 RX ADMIN — FUROSEMIDE 40 MG: 10 INJECTION, SOLUTION INTRAMUSCULAR; INTRAVENOUS at 23:53

## 2025-01-19 LAB
ANION GAP SERPL CALCULATED.3IONS-SCNC: 7 MMOL/L (ref 5–15)
BUN SERPL-MCNC: 25 MG/DL (ref 8–23)
BUN/CREAT SERPL: 23.8 (ref 7–25)
CALCIUM SPEC-SCNC: 10.3 MG/DL (ref 8.6–10.5)
CHLORIDE SERPL-SCNC: 105 MMOL/L (ref 98–107)
CO2 SERPL-SCNC: 27 MMOL/L (ref 22–29)
CREAT SERPL-MCNC: 1.05 MG/DL (ref 0.57–1)
DEPRECATED RDW RBC AUTO: 46.6 FL (ref 37–54)
EGFRCR SERPLBLD CKD-EPI 2021: 50.9 ML/MIN/1.73
ERYTHROCYTE [DISTWIDTH] IN BLOOD BY AUTOMATED COUNT: 12 % (ref 12.3–15.4)
GLUCOSE SERPL-MCNC: 81 MG/DL (ref 65–99)
HCT VFR BLD AUTO: 36.3 % (ref 34–46.6)
HGB BLD-MCNC: 12 G/DL (ref 12–15.9)
MCH RBC QN AUTO: 35.2 PG (ref 26.6–33)
MCHC RBC AUTO-ENTMCNC: 33.1 G/DL (ref 31.5–35.7)
MCV RBC AUTO: 106.5 FL (ref 79–97)
PLATELET # BLD AUTO: 104 10*3/MM3 (ref 140–450)
PMV BLD AUTO: 9.4 FL (ref 6–12)
POTASSIUM SERPL-SCNC: 4.6 MMOL/L (ref 3.5–5.2)
QT INTERVAL: 463 MS
QTC INTERVAL: 463 MS
RBC # BLD AUTO: 3.41 10*6/MM3 (ref 3.77–5.28)
SODIUM SERPL-SCNC: 139 MMOL/L (ref 136–145)
TROPONIN T SERPL HS-MCNC: 24 NG/L
WBC NRBC COR # BLD AUTO: 2.87 10*3/MM3 (ref 3.4–10.8)

## 2025-01-19 PROCEDURE — G0378 HOSPITAL OBSERVATION PER HR: HCPCS

## 2025-01-19 PROCEDURE — 85027 COMPLETE CBC AUTOMATED: CPT

## 2025-01-19 PROCEDURE — 97161 PT EVAL LOW COMPLEX 20 MIN: CPT

## 2025-01-19 PROCEDURE — 84484 ASSAY OF TROPONIN QUANT: CPT

## 2025-01-19 PROCEDURE — 99214 OFFICE O/P EST MOD 30 MIN: CPT

## 2025-01-19 PROCEDURE — 80048 BASIC METABOLIC PNL TOTAL CA: CPT

## 2025-01-19 PROCEDURE — 25010000002 FUROSEMIDE PER 20 MG

## 2025-01-19 RX ORDER — SODIUM CHLORIDE 0.9 % (FLUSH) 0.9 %
10 SYRINGE (ML) INJECTION EVERY 12 HOURS SCHEDULED
Status: DISCONTINUED | OUTPATIENT
Start: 2025-01-19 | End: 2025-01-20 | Stop reason: HOSPADM

## 2025-01-19 RX ORDER — AMOXICILLIN 250 MG
2 CAPSULE ORAL 2 TIMES DAILY PRN
Status: DISCONTINUED | OUTPATIENT
Start: 2025-01-19 | End: 2025-01-20 | Stop reason: HOSPADM

## 2025-01-19 RX ORDER — MULTIVITAMIN WITH IRON
1000 TABLET ORAL DAILY
Status: DISCONTINUED | OUTPATIENT
Start: 2025-01-19 | End: 2025-01-20 | Stop reason: HOSPADM

## 2025-01-19 RX ORDER — NITROGLYCERIN 0.4 MG/1
0.4 TABLET SUBLINGUAL
Status: DISCONTINUED | OUTPATIENT
Start: 2025-01-19 | End: 2025-01-20 | Stop reason: HOSPADM

## 2025-01-19 RX ORDER — FUROSEMIDE 10 MG/ML
40 INJECTION INTRAMUSCULAR; INTRAVENOUS ONCE
Status: COMPLETED | OUTPATIENT
Start: 2025-01-19 | End: 2025-01-19

## 2025-01-19 RX ORDER — SODIUM CHLORIDE 0.9 % (FLUSH) 0.9 %
10 SYRINGE (ML) INJECTION AS NEEDED
Status: DISCONTINUED | OUTPATIENT
Start: 2025-01-19 | End: 2025-01-20 | Stop reason: HOSPADM

## 2025-01-19 RX ORDER — DOCUSATE SODIUM 100 MG/1
100 CAPSULE, LIQUID FILLED ORAL 2 TIMES DAILY
Status: DISCONTINUED | OUTPATIENT
Start: 2025-01-19 | End: 2025-01-20 | Stop reason: HOSPADM

## 2025-01-19 RX ORDER — VALACYCLOVIR HYDROCHLORIDE 500 MG/1
1000 TABLET, FILM COATED ORAL DAILY
Status: DISCONTINUED | OUTPATIENT
Start: 2025-01-19 | End: 2025-01-20 | Stop reason: HOSPADM

## 2025-01-19 RX ORDER — BISACODYL 10 MG
10 SUPPOSITORY, RECTAL RECTAL DAILY PRN
Status: DISCONTINUED | OUTPATIENT
Start: 2025-01-19 | End: 2025-01-20 | Stop reason: HOSPADM

## 2025-01-19 RX ORDER — PANTOPRAZOLE SODIUM 40 MG/1
40 TABLET, DELAYED RELEASE ORAL NIGHTLY
Status: DISCONTINUED | OUTPATIENT
Start: 2025-01-19 | End: 2025-01-20 | Stop reason: HOSPADM

## 2025-01-19 RX ORDER — SODIUM CHLORIDE 9 MG/ML
40 INJECTION, SOLUTION INTRAVENOUS AS NEEDED
Status: DISCONTINUED | OUTPATIENT
Start: 2025-01-19 | End: 2025-01-20 | Stop reason: HOSPADM

## 2025-01-19 RX ORDER — MESALAMINE 0.38 G/1
1.5 CAPSULE, EXTENDED RELEASE ORAL DAILY
Status: DISCONTINUED | OUTPATIENT
Start: 2025-01-19 | End: 2025-01-20 | Stop reason: HOSPADM

## 2025-01-19 RX ORDER — POLYETHYLENE GLYCOL 3350 17 G/17G
17 POWDER, FOR SOLUTION ORAL DAILY PRN
Status: DISCONTINUED | OUTPATIENT
Start: 2025-01-19 | End: 2025-01-20 | Stop reason: HOSPADM

## 2025-01-19 RX ORDER — GUAIFENESIN 600 MG/1
600 TABLET, EXTENDED RELEASE ORAL EVERY 12 HOURS SCHEDULED
Status: DISCONTINUED | OUTPATIENT
Start: 2025-01-19 | End: 2025-01-20 | Stop reason: HOSPADM

## 2025-01-19 RX ORDER — ACETAMINOPHEN 500 MG
500 TABLET ORAL 2 TIMES DAILY PRN
Status: DISCONTINUED | OUTPATIENT
Start: 2025-01-19 | End: 2025-01-20 | Stop reason: HOSPADM

## 2025-01-19 RX ORDER — BISACODYL 5 MG/1
5 TABLET, DELAYED RELEASE ORAL DAILY PRN
Status: DISCONTINUED | OUTPATIENT
Start: 2025-01-19 | End: 2025-01-20 | Stop reason: HOSPADM

## 2025-01-19 RX ORDER — SPIRONOLACTONE 25 MG/1
12.5 TABLET ORAL DAILY
Status: DISCONTINUED | OUTPATIENT
Start: 2025-01-19 | End: 2025-01-20 | Stop reason: HOSPADM

## 2025-01-19 RX ADMIN — Medication 10 ML: at 08:56

## 2025-01-19 RX ADMIN — MESALAMINE 1.5 G: 0.38 CAPSULE, EXTENDED RELEASE ORAL at 08:56

## 2025-01-19 RX ADMIN — GUAIFENESIN 600 MG: 600 TABLET, EXTENDED RELEASE ORAL at 02:09

## 2025-01-19 RX ADMIN — GUAIFENESIN 600 MG: 600 TABLET, EXTENDED RELEASE ORAL at 21:18

## 2025-01-19 RX ADMIN — GUAIFENESIN 600 MG: 600 TABLET, EXTENDED RELEASE ORAL at 14:51

## 2025-01-19 RX ADMIN — DOCUSATE SODIUM 100 MG: 100 CAPSULE, LIQUID FILLED ORAL at 21:18

## 2025-01-19 RX ADMIN — Medication 1000 MCG: at 08:55

## 2025-01-19 RX ADMIN — PANTOPRAZOLE SODIUM 40 MG: 40 TABLET, DELAYED RELEASE ORAL at 21:18

## 2025-01-19 RX ADMIN — VALACYCLOVIR HYDROCHLORIDE 1000 MG: 500 TABLET, FILM COATED ORAL at 09:06

## 2025-01-19 RX ADMIN — FUROSEMIDE 40 MG: 10 INJECTION, SOLUTION INTRAMUSCULAR; INTRAVENOUS at 10:20

## 2025-01-19 RX ADMIN — Medication 10 ML: at 02:09

## 2025-01-19 RX ADMIN — Medication 10 ML: at 21:18

## 2025-01-19 RX ADMIN — ACETAMINOPHEN 500 MG: 500 TABLET, FILM COATED ORAL at 10:17

## 2025-01-19 RX ADMIN — DOCUSATE SODIUM 100 MG: 100 CAPSULE, LIQUID FILLED ORAL at 08:55

## 2025-01-19 NOTE — PLAN OF CARE
Goal Outcome Evaluation:  Plan of Care Reviewed With: patient, child        Progress: improving  Outcome Evaluation: Pt without complaints since admission. Purewick in place for accurate output measurement. 1650 mL in urine output since admission. Minimal edema to BLE. Cardiology and PT to see today.

## 2025-01-19 NOTE — PROGRESS NOTES
ED OBSERVATION PROGRESS/DISCHARGE SUMMARY    Date of Admission: 1/18/2025   LOS: 0 days   PCP: Mega Esparza MD    Final Diagnosis CHF exacerbation      Subjective     Hospital Outcome:     Brittany Villeda is a 89 y.o. female with medical history significant for HFpEF, GERD, pulmonary hypertension, asthma, MGUS, HUGO, A-fib, trifascicular block with pacemaker placement presenting with worsening dyspnea and persistent productive cough for 3 days.  She denies fevers or sick contact.  She is comfortable at rest however becomes dyspneic with minimal exertion including talking and eating.  Her daughter reports she is prescribed Lasix 20 mg daily and spironolactone 12.5 mg daily and was recently taken off of her potassium supplement due to hyperkalemia.  Normally she prepares her own medicine and her daughter checks in for her, however, this past week her daughter has been sick and she thinks Ms. Villeda may have accidentally taken some potassium supplement and forgot to take her spironolactone.      She follows with Dr. Culp for cardiology care and sees Dr. Win for diuretic management in the heart failure clinic.    1/19/2025: Patient reports that she is feeling better than on admission.  Patient has had good diuresis since admission.  Labs improving.  Cardiology has seen and evaluated the patient and recommending that the patient get another round of IV diuretics this morning and likely be able to switch her back to her home diuretics tomorrow.  Will reevaluate in the a.m.    ROS:  General: no fevers, chills  Respiratory: no cough, dyspnea  Cardiovascular: no chest pain, palpitations  Abdomen: No abdominal pain, nausea, vomiting, or diarrhea  Neurologic: No focal weakness    Objective   Physical Exam:  I have reviewed the vital signs.  Temp:  [96.7 °F (35.9 °C)-97.8 °F (36.6 °C)] 97.8 °F (36.6 °C)  Heart Rate:  [57-61] 60  Resp:  [17-20] 17  BP: ()/(57-67) 99/63  General Appearance:    Alert,  cooperative, no distress, frail, chronically ill-appearing female, nontoxic-appearing  Head:    Normocephalic, atraumatic, hard of hearing  Eyes:    Sclerae anicteric, EOMI  Neck:   Supple, nontender  Lungs: Clear to auscultation bilaterally, respirations unlabored on room air  Heart: Regular rate and rhythm, S1 and S2 normal, no murmur  Abdomen:  Soft, nontender, bowel sounds active, nondistended  Extremities: No clubbing, cyanosis, or edema to lower extremities  Pulses:  2+ and symmetric in distal lower extremities  Skin: No rashes   Neurologic: Oriented x3, Normal strength to extremities    Results Review:    I have reviewed the labs, radiology results and diagnostic studies.    Results from last 7 days   Lab Units 01/19/25  0334   WBC 10*3/mm3 2.87*   HEMOGLOBIN g/dL 12.0   HEMATOCRIT % 36.3   PLATELETS 10*3/mm3 104*     Results from last 7 days   Lab Units 01/19/25  0334 01/18/25  2121   SODIUM mmol/L 139 138   POTASSIUM mmol/L 4.6 5.5*   CHLORIDE mmol/L 105 104   CO2 mmol/L 27.0 27.4   BUN mg/dL 25* 27*   CREATININE mg/dL 1.05* 1.34*   CALCIUM mg/dL 10.3 10.4   BILIRUBIN mg/dL  --  1.0   ALK PHOS U/L  --  66   ALT (SGPT) U/L  --  6   AST (SGOT) U/L  --  12   GLUCOSE mg/dL 81 124*     Imaging Results (Last 24 Hours)       Procedure Component Value Units Date/Time    XR Chest 1 View [[id number]] Collected: 01/18/25 2143     Updated: 01/18/25 2149    Narrative:      SINGLE VIEW OF THE CHEST     HISTORY: Shortness of air     COMPARISON: December 20, 2024     FINDINGS:  There is cardiomegaly. There may be some vascular congestion. There is  elevation right hemidiaphragm, with associated bronchovascular crowding.  No pneumothorax or pleural effusion is seen. Left-sided pacemaker is  present. There is calcification of the aorta. There are background  changes of COPD. There appears to be some scarring at the left lung  base.       Impression:      Cardiomegaly with suspected vascular congestion.     This report was  finalized on 1/18/2025 9:46 PM by Dr. Luna Oleary M.D on Workstation: [id number]               I have reviewed the medications.     Discharge Medications        ASK your doctor about these medications        Instructions Start Date   acetaminophen 500 MG tablet  Commonly known as: TYLENOL   500 mg, Oral, 2 Times Daily      docusate sodium 100 MG capsule  Commonly known as: COLACE   100 mg, Oral, 2 Times Daily PRN      ELDERBERRY PO   2 tablets, Daily      furosemide 40 MG tablet  Commonly known as: LASIX   20 mg, Oral, Daily, Taking 20mg      lidocaine 5 % ointment  Commonly known as: XYLOCAINE   APPLY 1 A THIN LAYER TOPICALLY DAILY AS DIRECTED      Lidocaine 50 MG rectal suppository   50 mg, Rectal, Daily PRN      Magnesium Oxide -Mg Supplement 400 (240 Mg) MG tablet   400 mg, Oral, Daily      mesalamine 1000 MG suppository  Commonly known as: CANASA   UNWRAP AND INSERT 1 SUPPOSITORY RECTALLY EVERY NIGHT AT BEDTIME      mesalamine 0.375 g 24 hr capsule  Commonly known as: APRISO   1.5 g, Oral, Daily      pantoprazole 40 MG EC tablet  Commonly known as: PROTONIX   40 mg, Oral, Daily      spironolactone 25 MG tablet  Commonly known as: ALDACTONE   12.5 mg, Oral, Daily      valACYclovir 1000 MG tablet  Commonly known as: VALTREX   1,000 mg, Daily      vitamin B-12 1000 MCG tablet  Commonly known as: CYANOCOBALAMIN   1,000 mcg, Oral, Daily              ---------------------------------------------------------------------------------------------  Assessment & Plan   Assessment/Problem List    Shortness of breath      Plan:    Shortness of air  HFpEF  -Troponin 23, 28.  EKG shows V paced rhythm with underlying A-fib  -BNP 1025  -RVP negative  -PPM interrogation was unremarkable  -Chest x-ray show cardiomegaly with vascular congestion  -Strict I's and O's  -Patient has had good diuresis since admission  -Cardiology has seen and evaluated the patient and recommends 1 more round of IV diuretics this morning and  likely back onto her p.o. diuretics tomorrow.  Will reevaluate in the morning     Hyperkalemia, improving  -Received one-time IV Lasix in the ED  -Surveillance labs     RACH, improving  -Creatinine 1.34, baseline around 1.0  -Although etiology  -Gentle diuresis  -Surveillance labs    Disposition: Likely discharge in 1 to 2 days    Follow-up after Discharge: Dependent on hospital course    This note will serve as a progress note    Kiah Matthews PA-C 01/19/25 07:33 EST    I have worn appropriate PPE during this patient encounter, sanitized my hands both with entering and exiting patient's room.      56 minutes has been spent by [facility] providers in the care of this patient while under observation status

## 2025-01-19 NOTE — ED NOTES
"Nursing report ED to floor  Brittany Villeda  89 y.o.  female    HPI :  HPI  Stated Reason for Visit: soa x3 days with non-productive cough  History Obtained From: EMS    Chief Complaint  Chief Complaint   Patient presents with    Shortness of Breath    Cough       Admitting doctor:   Rc Garber MD    Admitting diagnosis:   CHF exacerbation    Code status:   Current Code Status       Date Active Code Status Order ID Comments User Context       Prior            Allergies:   Codeine, Amitriptyline, Amoxicillin-pot clavulanate, Aspirin, Carisoprodol-aspirin-codeine, Iodinated contrast media, Latex, Naproxen, Nsaids, Soma compound with codeine [carisoprodol-aspirin-codeine], Sulfa antibiotics, and Tramadol    Isolation:   No active isolations    Intake and Output  No intake or output data in the 24 hours ending 01/18/25 2324    Weight:   There were no vitals filed for this visit.    Most recent vitals:   Vitals:    01/18/25 2125 01/18/25 2131 01/18/25 2133 01/18/25 2140   BP: 111/62 122/60     Pulse:  61  57   Resp:       Temp:       SpO2:  100%  100%   Height:   162.6 cm (64\")        Active LDAs/IV Access:   Lines, Drains & Airways       Active LDAs       Name Placement date Placement time Site Days    Peripheral IV 01/18/25 2123 Right Antecubital 01/18/25 2123  Antecubital  less than 1                    Labs (abnormal labs have a star):   Labs Reviewed   COMPREHENSIVE METABOLIC PANEL - Abnormal; Notable for the following components:       Result Value    Glucose 124 (*)     BUN 27 (*)     Creatinine 1.34 (*)     Potassium 5.5 (*)     Albumin 3.4 (*)     eGFR 38.0 (*)     All other components within normal limits    Narrative:     GFR Categories in Chronic Kidney Disease (CKD)      GFR Category          GFR (mL/min/1.73)    Interpretation  G1                     90 or greater         Normal or high (1)  G2                      60-89                Mild decrease (1)  G3a                   45-59                " Mild to moderate decrease  G3b                   30-44                Moderate to severe decrease  G4                    15-29                Severe decrease  G5                    14 or less           Kidney failure          (1)In the absence of evidence of kidney disease, neither GFR category G1 or G2 fulfill the criteria for CKD.    eGFR calculation 2021 CKD-EPI creatinine equation, which does not include race as a factor   TROPONIN - Abnormal; Notable for the following components:    HS Troponin T 23 (*)     All other components within normal limits    Narrative:     High Sensitive Troponin T Reference Range:  <14.0 ng/L- Negative Female for AMI  <22.0 ng/L- Negative Male for AMI  >=14 - Abnormal Female indicating possible myocardial injury.  >=22 - Abnormal Male indicating possible myocardial injury.   Clinicians would have to utilize clinical acumen, EKG, Troponin, and serial changes to determine if it is an Acute Myocardial Infarction or myocardial injury due to an underlying chronic condition.        CBC WITH AUTO DIFFERENTIAL - Abnormal; Notable for the following components:    RBC 3.58 (*)     .1 (*)     MCH 36.3 (*)     RDW 11.4 (*)     Platelets 106 (*)     All other components within normal limits   MANUAL DIFFERENTIAL - Abnormal; Notable for the following components:    Neutrophil % 23.5 (*)     Lymphocyte % 53.1 (*)     Eosinophil % 0.0 (*)     Atypical Lymphocyte % 17.3 (*)     Neutrophils Absolute 0.87 (*)     All other components within normal limits   HIGH SENSITIVITIY TROPONIN T 1HR - Abnormal; Notable for the following components:    HS Troponin T 20 (*)     All other components within normal limits    Narrative:     High Sensitive Troponin T Reference Range:  <14.0 ng/L- Negative Female for AMI  <22.0 ng/L- Negative Male for AMI  >=14 - Abnormal Female indicating possible myocardial injury.  >=22 - Abnormal Male indicating possible myocardial injury.   Clinicians would have to utilize  "clinical acumen, EKG, Troponin, and serial changes to determine if it is an Acute Myocardial Infarction or myocardial injury due to an underlying chronic condition.        RESPIRATORY PANEL PCR W/ COVID-19 (SARS-COV-2), NP SWAB IN UTM/VTP, 2 HR TAT - Normal    Narrative:     In the setting of a positive respiratory panel with a viral infection PLUS a negative procalcitonin without other underlying concern for bacterial infection, consider observing off antibiotics or discontinuation of antibiotics and continue supportive care. If the respiratory panel is positive for atypical bacterial infection (Bordetella pertussis, Chlamydophila pneumoniae, or Mycoplasma pneumoniae), consider antibiotic de-escalation to target atypical bacterial infection.   LACTIC ACID, PLASMA - Normal   PROCALCITONIN - Normal    Narrative:     As a Marker for Sepsis (Non-Neonates):    1. <0.5 ng/mL represents a low risk of severe sepsis and/or septic shock.  2. >2 ng/mL represents a high risk of severe sepsis and/or septic shock.    As a Marker for Lower Respiratory Tract Infections that require antibiotic therapy:    PCT on Admission    Antibiotic Therapy       6-12 Hrs later    >0.5                Strongly Recommended  >0.25 - <0.5        Recommended   0.1 - 0.25          Discouraged              Remeasure/reassess PCT  <0.1                Strongly Discouraged     Remeasure/reassess PCT    As 28 day mortality risk marker: \"Change in Procalcitonin Result\" (>80% or <=80%) if Day 0 (or Day 1) and Day 4 values are available. Refer to http://[email or web address]-pct-calculator.[email or web address]    Change in PCT <=80%  A decrease of PCT levels below or equal to 80% defines a positive change in PCT test result representing a higher risk for 28-day all-cause mortality of patients diagnosed with severe sepsis for septic shock.    Change in PCT >80%  A decrease of PCT levels of more than 80% defines a negative change in PCT result representing a lower risk for 28-day " all-cause mortality of patients diagnosed with severe sepsis or septic shock.      BNP (IN-HOUSE) - Normal    Narrative:     This assay is used as an aid in the diagnosis of individuals suspected of having heart failure. It can be used as an aid in the diagnosis of acute decompensated heart failure (ADHF) in patients presenting with signs and symptoms of ADHF to the emergency department (ED). In addition, NT-proBNP of <300 pg/mL indicates ADHF is not likely.    Age Range Result Interpretation  NT-proBNP Concentration (pg/mL:      <50             Positive            >450                   Gray                 300-450                    Negative             <300    50-75           Positive            >900                  Gray                300-900                  Negative            <300      >75             Positive            >1800                  Gray                300-1800                  Negative            <300   CBC AND DIFFERENTIAL    Narrative:     The following orders were created for panel order CBC & Differential.  Procedure                               Abnormality         Status                     ---------                               -----------         ------                     CBC Auto Differential[[id number]]        Abnormal            Final result                 Please view results for these tests on the individual orders.       EKG:   ECG 12 Lead Dyspnea   Preliminary Result   HEART RATE=60  bpm   RR Vzokddgc=2094  ms   SD Interval=  ms   P Horizontal Axis=126  deg   P Front Axis=  deg   QRSD Ikqvgmsl=284  ms   QT Ohkfnvxa=375  ms   NTtG=414  ms   QRS Axis=-72  deg   T Wave Axis=100  deg   - ABNORMAL ECG -   Afib/flut and V-paced complexes   No further analysis attempted due to paced rhythm   Date and Time of Study:2025-01-18 21:37:57          Meds given in ED:   Medications   sodium chloride 0.9 % flush 10 mL (has no administration in time range)   furosemide (LASIX) injection 40 mg (has  no administration in time range)       Imaging results:  XR Chest 1 View    Result Date: 2025  Cardiomegaly with suspected vascular congestion.  This report was finalized on 2025 9:46 PM by Dr. Luna Oleary M.D on Workstation: [id number]       Ambulatory status:   - stand by assist    Social issues:   Social History     Socioeconomic History    Marital status:     Number of children: 10    Years of education: High School   Tobacco Use    Smoking status: Former     Current packs/day: 0.00     Average packs/day: 1.5 packs/day for 12.0 years (18.0 ttl pk-yrs)     Types: Cigarettes     Start date:      Quit date:      Years since quittin.0     Passive exposure: Past    Smokeless tobacco: Never    Tobacco comments:     QUIT SMOKING    Vaping Use    Vaping status: Never Used   Substance and Sexual Activity    Alcohol use: No     Comment: caffeine - decaf coffee    Drug use: Never    Sexual activity: Defer       Peripheral Neurovascular  Peripheral Neurovascular (Adult)  Peripheral Neurovascular WDL: WDL    Neuro Cognitive  Neuro Cognitive (Adult)  Cognitive/Neuro/Behavioral WDL: WDL    Learning       Respiratory  Respiratory WDL  Respiratory WDL: WDL    Abdominal Pain       Pain Assessments  Pain (Adult)  (0-10) Pain Rating: Rest: 0  (0-10) Pain Rating: Activity: 0    NIH Stroke Scale       Tony Schofield RN  25 23:24 EST

## 2025-01-19 NOTE — H&P
[facility]   HISTORY AND PHYSICAL    Patient Name: Brittany Villeda  : 1935  MRN: [id number]  Primary Care Physician:  Mega Esparza MD  Date of admission: 2025    Subjective   Subjective     Chief Complaint:   Chief Complaint   Patient presents with    Shortness of Breath    Cough         HPI:    Brittany Villeda is a 89 y.o. female with medical history significant for HFpEF, GERD, pulmonary hypertension, asthma, MGUS, HUGO, A-fib, trifascicular block with pacemaker placement presenting with worsening dyspnea and persistent productive cough for 3 days.  She denies fevers or sick contact.  She is comfortable at rest however becomes dyspneic with minimal exertion including talking and eating.  Her daughter reports she is prescribed Lasix 20 mg daily and spironolactone 12.5 mg daily and was recently taken off of her potassium supplement due to hyperkalemia.  Normally she prepares her own medicine and her daughter checks in for her, however, this past week her daughter has been sick and she thinks Ms. Villeda may have accidentally taken some potassium supplement and forgot to take her spironolactone.     She follows with Dr. Culp for cardiology care and sees Dr. Win for diuretic management in the heart failure clinic.    Review of Systems   All systems were reviewed and negative except for: Productive cough, exertional dyspnea.       Personal History     Past Medical History:   Diagnosis Date    Abdominal aortic aneurysm     Anemia     Arrhythmia     Arthritis     Asthma     Bradycardia     CHF (congestive heart failure) 2023    Choledocholithiasis 2021    Colitis     Diastolic dysfunction     Essential hypertension 2016    GERD (gastroesophageal reflux disease)     Heart block     Hypertension     Hypertensive heart disease     Hyperthyroidism     REPORTS ENLARGED    Inguinal hernia     Kidney stone     Leukopenia     MGUS (monoclonal gammopathy of unknown significance)      Nephrolithiasis     Pacemaker     Paroxysmal atrial fibrillation     Peptic ulceration     Pressure ulcer     REPORTS ON COCCYX. INSTR TO NOTIFY DR FOSTER'S OFFICE    PSVT (paroxysmal supraventricular tachycardia)     Pulmonary hypertension     Rectal bleed     Sleep apnea     NO DEVICE    Subdural hematoma     Systemic hypertension     Trifascicular block     Ulcerative rectosigmoiditis without complication     Ventricular tachycardia     nonsustained       Past Surgical History:   Procedure Laterality Date    BACK SURGERY      lumbar fusion    DUSTY HOLE Left 08/08/2021    Procedure: Left-sided dusty holes for evacuation of subdural hematoma;  Surgeon: Gustavo aCllaway MD;  Location: [facility] MAIN OR;  Service: Neurosurgery;  Laterality: Left;    CARDIAC ELECTROPHYSIOLOGY PROCEDURE N/A 11/07/2018    Procedure: Pacemaker DC new   [place];  Surgeon: Jesus Granda MD;  Location: [facility] CATH INVASIVE LOCATION;  Service: Cardiology    CHOLECYSTECTOMY      COLONOSCOPY  06/16/2014    colitis, cryptitis,  tics, NBIH, TA w/low grade dysplasia    COLONOSCOPY N/A 10/28/2019    Procedure: COLONOSCOPY WITH COLD AND HOT POLYPECTOMIES;  Surgeon: Micaela English MD;  Location: [facility] ENDOSCOPY;  Service: Gastroenterology    ENDOSCOPY N/A 05/13/2021    Procedure: ESOPHAGOGASTRODUODENOSCOPY with BX;  Surgeon: Stefan Mcfadden MD;  Location: [facility] ENDOSCOPY;  Service: Gastroenterology;  Laterality: N/A;  EPIGASTRIC PAIN  --DUODENAL ULCER, HEMORRHAGIC GASTRITIS, HIATAL HERNIA     ENDOSCOPY N/A 10/11/2022    Procedure: ESOPHAGOGASTRODUODENOSCOPY;  Surgeon: Micaela English MD;  Location: [facility] ENDOSCOPY;  Service: Gastroenterology;  Laterality: N/A;  PRE- MELENA  POST- ESOPHAGITIS    HEMORRHOIDECTOMY      HERNIA REPAIR      umbilical    HYSTERECTOMY      INGUINAL HERNIA REPAIR Left 9/1/2023    Procedure: INGUINAL HERNIA REPAIR LEFT;  Surgeon: Antonio Foster MD;  Location: [facility] OR OSC;  Service: General;   Laterality: Left;    JOINT REPLACEMENT Bilateral     KNEES    MYOMECTOMY      PACEMAKER IMPLANTATION      SHOULDER SURGERY      SIGMOIDOSCOPY N/A 11/02/2022    Procedure: SIGMOIDOSCOPY FLEXIBLE WITH COLD BIOPSIES AND ASPIRATE;  Surgeon: Micaela English MD;  Location: [facility] ENDOSCOPY;  Service: Gastroenterology;  Laterality: N/A;  PRE- RECTAL BLEEDING  POST- HEMORRHOIDS, DIVERTICULOSIS, COLITIS    SINUS SURGERY      TOE NAIL AMPUTATION  03/04/2019    TONSILLECTOMY         Family History: family history includes Breast cancer in her sister; Diabetes in her mother; Heart disease in her sister; Kidney cancer in her sister; Prostate cancer in her brother, brother, and brother. Otherwise pertinent FHx was reviewed and not pertinent to current issue.    Social History:  reports that she quit smoking about 60 years ago. Her smoking use included cigarettes. She started smoking about 72 years ago. She has a 18 pack-year smoking history. She has been exposed to tobacco smoke. She has never used smokeless tobacco. She reports that she does not drink alcohol and does not use drugs.    Home Medications:  Elderberry, Lidocaine, Magnesium Oxide -Mg Supplement, acetaminophen, docusate sodium, furosemide, lidocaine, mesalamine, pantoprazole, spironolactone, valACYclovir, and vitamin B-12    Allergies:  Allergies   Allergen Reactions    Codeine Hallucinations     Tolerates hydromorphone    Amitriptyline Rash    Amoxicillin-Pot Clavulanate Rash    Aspirin Unknown - Low Severity     Patient doesn't know why    Carisoprodol-Aspirin-Codeine Palpitations    Iodinated Contrast Media Rash    Latex Rash    Naproxen Rash    Nsaids Unknown - Low Severity     UNSURE OF REACTION    Soma Compound With Codeine [Carisoprodol-Aspirin-Codeine] Rash    Sulfa Antibiotics Rash    Tramadol Palpitations     heart races        Objective   Objective     Vitals:   Temp:  [96.7 °F (35.9 °C)] 96.7 °F (35.9 °C)  Heart Rate:  [57-61] 57  Resp:  [20] 20  BP:  (111-122)/(60-62) 122/60  Flow (L/min) (Oxygen Therapy):  [2] 2   Physical Exam:     Constitutional: Awake, alert.  Frail.   Eyes: PERRL x2, sclerae anicteric, no conjunctival injection. No EOM abnormalities.   HENT: NCAT, mucous membranes moist,   Neck: Supple, no thyromegaly, no lymphadenopathy, trachea midline  Respiratory: Clear to auscultation bilaterally, nonlabored respirations   Cardiovascular: RRR, no murmurs, rubs, or gallops, palpable pedal pulses bilaterally. No appreciable edema. + JVD  Gastrointestinal: Positive bowel sounds, soft, nontender, not distended.   Musculoskeletal: No bilateral ankle edema, no clubbing or cyanosis to extremities. No obvious deformities.   Psychiatric: Appropriate affect, cooperative. Converses appropriately for age.   Neurologic: Oriented x 3, strength symmetric in all extremities. Cranial nerves grossly intact to confrontation, speech clear  Skin: No rashes, skin intact.     Result Review    Result Review:  I have personally reviewed the results from the time of this admission to 1/18/2025 23:16 EST and agree with these findings:  [x]  Laboratory list / accordion  []  Microbiology  []  Radiology  [x]  EKG/Telemetry   [x]  Cardiology/Vascular   []  Pathology  [x]  Old records  []  Other:  Most notable findings include:     Her ED workup is significant for troponin 23 --> 28.  BNP 1025, potassium 5.5, creatinine 1.34, unremarkable CBC, RVP negative, chest x-ray show vascular congestion.    TTE April 2024 show LVEF = 61 to 65%, there is mild RV dilation.  Biatrial severely dilated at, there is mild aortic, mitral and mild to moderate tricuspid regurgitation.  RV systolic pressure is 38 mmHg.    Assessment & Plan   Assessment / Plan     Brief Patient Summary:  Brittany Villeda is a 89 y.o. female who presents with a productive cough and shortness of air for the past 3 to 4 days.  Her chest x-ray shows vascular congestion and family reports she may have accidentally taken  potassium supplement instead of her spironolactone.  She is incidentally noted to have elevated creatinine 1.34.  Baseline around 1.0.    Active Hospital Problems:  Active Hospital Problems    Diagnosis     **Shortness of breath      Plan:     Shortness of air  HFpEF  -Troponin 23, 28.  EKG shows V paced rhythm with underlying A-fib  -BNP 1025  -RVP negative  -PPM interrogation was unremarkable  -Chest x-ray show cardiomegaly with vascular congestion  -IV Lasix received in the ED  -Strict I's and O's  -Continuous cardiac monitoring  -Supplemental O2 for comfort  -Cardiology consultation    Hyperkalemia  -Received one-time IV Lasix in the ED  -Surveillance labs    RACH  -Creatinine 1.34, baseline around 1.0  -Although etiology  -Gentle diuresis  -Surveillance labs    VTE Prophylaxis:  Mechanical VTE prophylaxis orders are present.        CODE STATUS: DNR     Admission Status:  I believe this patient meets observation status.    Electronically signed by SHAHID Xie, 01/18/25, 11:16 PM EST.        75 minutes has been spent by [facility] providers in the care of this patient while under observation status      I have worn appropriate PPE during this patient encounter, sanitized my hands both with entering and exiting patient's room.    I have discussed plan of care with patient including advance care plan and/or surrogate decision maker.  Patient advises that their daughter, will be their primary surrogate decision maker

## 2025-01-19 NOTE — PLAN OF CARE
Goal Outcome Evaluation:  Plan of Care Reviewed With: patient, child      Pt admit from home due to diastolic heart failure, RACH, hyperkalemia, pulmonary HTN, persistent afib. Pt lives with her son with ramp into home. She amb with stc or rwx. Pt min/cga to eob today. STS with rwx and sba/cga. She amb 150' with cga. No lob noted today. Flexed posture, flexed knees, shuffle pattern with pronation noted mai ankles. Pt appeared to tolerate ambulation well. Pt to bsc for Bm following amb today. Pt will likely benefit from cont skilled PT to address general weakness, impaired balance with transfers and amb.           Anticipated Discharge Disposition (PT): home with assist, home with home health

## 2025-01-19 NOTE — ED PROVIDER NOTES
EMERGENCY DEPARTMENT ENCOUNTER  Room Number:  129/1  PCP: Mega Esparza MD  Independent Historians: Patient and EMS      HPI:  Chief Complaint: had concerns including Shortness of Breath and Cough.     A complete HPI/ROS/PMH/PSH/SH/FH are unobtainable due to: None    Chronic or social conditions impacting patient care (Social Determinants of Health): None      Context: Brittany Villeda is a 89 y.o. female with a medical history of hypertension, asthma, hypothyroidism, anemia, GERD, arthritis and sleep apnea who presents to the ED c/o acute cough and dyspnea that has been persistently worsening for the past 3 days.  She denies chest pain.  She has not had fevers.  She has had some generalized fatigue and malaise symptoms.  She says there have been some ill family members that she was exposed to in the past week as well.  When paramedics and fire personnel responded to her home tonight, they say that she was audibly wheezing.  They administered 1 albuterol nebulized therapy prior to arrival.  Patient does not wear oxygen at home.  She was placed on 2 L nasal cannula prior to arrival.      Review of prior external notes (non-ED) -and- Review of prior external test results outside of this encounter: I independently interpreted the family medicine progress note from January 13, 2025.  Patient had hospital follow-up at that time.  Discussed nontoxic multinodular goiter.  Record indicates that she had been hospitalized in December for management of COVID-19 URI symptoms.  ENT documentation around that same time indicated that patient had mild airway stricture which was stable and unchanged compared to prior CT scan from 4 years earlier.    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    MGUS (monoclonal gammopathy of unknown significance) 09/16/2016    Ulcerative rectosigmoiditis without complication 11/30/2017    Lichen sclerosus 08/23/2017    Heart block 10/30/2018    Sleep-related hypoxia 12/22/2018    Bradycardia 12/29/2018    History of atrial fibrillation 03/19/2019    Pacemaker 03/19/2019    Paroxysmal SVT (supraventricular tachycardia) 05/08/2019    History of chest pain 05/08/2019    Palpitations 05/08/2019    Paroxysmal atrial fibrillation 10/06/2019    Ascending aortic aneurysm 08/24/2020    Mediastinal lymphadenopathy 09/09/2020    Anemia     Obesity (BMI 30-39.9)     Generalized weakness 04/08/2021    Dysautonomia 04/09/2021    Hypercalcemia 04/11/2021    Hyperparathyroidism 04/28/2021    Choledocholithiasis 05/09/2021    Duodenal ulcer 05/13/2021    Hemorrhagic gastritis 05/13/2021    Exertional dyspnea 06/22/2021    COPD (chronic obstructive pulmonary disease)     Osteoarthritis of glenohumeral joint 07/12/2018    ICH (intracerebral hemorrhage) 08/05/2021    Lower GI bleed 11/01/2022    Thrombocytopenia 11/01/2022    Lichen sclerosus of female genitalia 03/13/2023    Senile atrophic vaginitis 03/13/2023    Left inguinal hernia 08/23/2023    Vulvar pain 02/01/2024    Bilateral lower extremity edema 04/24/2024    Chronic heart failure with preserved ejection fraction 04/24/2024    Acute renal insufficiency 04/25/2024    Dysfunction of right cardiac ventricle 05/29/2024    COVID-19 virus infection 12/20/2024     Resolved Ambulatory Problems     Diagnosis Date Noted    Abnormal weight loss 05/12/2016    Tachycardia 05/12/2016    Sciatica 05/12/2016    Nausea and vomiting 05/12/2016     Hyperthyroidism 05/12/2016    Fatigue 05/12/2016    Dizziness 05/12/2016    Diarrhea 05/12/2016    Abnormal thyroid stimulating hormone (TSH) level 05/12/2016    Abdominal pain 05/12/2016    Abnormal EKG 06/30/2016    Leukopenia 09/12/2016    Other chest pain 12/24/2017    Shoulder arthritis 01/28/2019    Rectal bleeding 10/15/2019    Arthritis 12/13/2019    Immobility 11/20/2020    Left knee pain 11/20/2020    Chronic anticoagulation 11/20/2020    Hemarthrosis of left knee 11/20/2020    Acute UTI (urinary tract infection) 04/08/2021    Weakness of both lower extremities 04/09/2021    Spondylosis of lumbosacral region without myelopathy or radiculopathy 04/09/2021    Macrocytosis 04/11/2021    Arthritis of right wrist 04/13/2021    Hypomagnesemia 04/13/2021    Essential hypertension 05/10/2021    Hyperglycemia 05/10/2021    Epigastric pain 05/12/2021    Functional quadriplegia 11/20/2020    Hip pain 04/12/2018    Other malaise and fatigue 05/25/2021    Shoulder pain 04/12/2018    Subdural hematoma 08/05/2021    Melena 09/29/2022    GI bleed 11/01/2022    Hypokalemia 04/26/2024     Past Medical History:   Diagnosis Date    Abdominal aortic aneurysm     Arrhythmia     Asthma     CHF (congestive heart failure) 04/2023    Colitis     GERD (gastroesophageal reflux disease)     Hypertension     Hypertensive heart disease     Inguinal hernia     Kidney stone     Nephrolithiasis     Peptic ulceration     Pressure ulcer     PSVT (paroxysmal supraventricular tachycardia)     Rectal bleed     Sleep apnea     Systemic hypertension     Ventricular tachycardia          PAST SURGICAL HISTORY  Past Surgical History:   Procedure Laterality Date    BACK SURGERY      lumbar fusion    PAWAN HOLE Left 08/08/2021    Procedure: Left-sided pawan holes for evacuation of subdural hematoma;  Surgeon: Gustavo Callaway MD;  Location: [facility] OR;  Service: Neurosurgery;  Laterality: Left;    CARDIAC ELECTROPHYSIOLOGY PROCEDURE N/A 11/07/2018     Procedure: Pacemaker DC new   [place];  Surgeon: Jesus Granda MD;  Location: [facility] CATH INVASIVE LOCATION;  Service: Cardiology    CHOLECYSTECTOMY      COLONOSCOPY  06/16/2014    colitis, cryptitis,  tics, NBIH, TA w/low grade dysplasia    COLONOSCOPY N/A 10/28/2019    Procedure: COLONOSCOPY WITH COLD AND HOT POLYPECTOMIES;  Surgeon: Micaela English MD;  Location: [facility] ENDOSCOPY;  Service: Gastroenterology    ENDOSCOPY N/A 05/13/2021    Procedure: ESOPHAGOGASTRODUODENOSCOPY with BX;  Surgeon: Stefan Mcfadden MD;  Location: [facility] ENDOSCOPY;  Service: Gastroenterology;  Laterality: N/A;  EPIGASTRIC PAIN  --DUODENAL ULCER, HEMORRHAGIC GASTRITIS, HIATAL HERNIA     ENDOSCOPY N/A 10/11/2022    Procedure: ESOPHAGOGASTRODUODENOSCOPY;  Surgeon: Micaela English MD;  Location: [facility] ENDOSCOPY;  Service: Gastroenterology;  Laterality: N/A;  PRE- MELENA  POST- ESOPHAGITIS    HEMORRHOIDECTOMY      HERNIA REPAIR      umbilical    HYSTERECTOMY      INGUINAL HERNIA REPAIR Left 9/1/2023    Procedure: INGUINAL HERNIA REPAIR LEFT;  Surgeon: Antonio Foster MD;  Location:  [facility];  Service: General;  Laterality: Left;    JOINT REPLACEMENT Bilateral     KNEES    MYOMECTOMY      PACEMAKER IMPLANTATION      SHOULDER SURGERY      SIGMOIDOSCOPY N/A 11/02/2022    Procedure: SIGMOIDOSCOPY FLEXIBLE WITH COLD BIOPSIES AND ASPIRATE;  Surgeon: Micaela English MD;  Location: [facility] ENDOSCOPY;  Service: Gastroenterology;  Laterality: N/A;  PRE- RECTAL BLEEDING  POST- HEMORRHOIDS, DIVERTICULOSIS, COLITIS    SINUS SURGERY      TOE NAIL AMPUTATION  03/04/2019    TONSILLECTOMY           FAMILY HISTORY  Family History   Problem Relation Age of Onset    Diabetes Mother     Breast cancer Sister     Kidney cancer Sister     Heart disease Sister     Prostate cancer Brother     Prostate cancer Brother     Prostate cancer Brother     Malig Hyperthermia Neg Hx          SOCIAL HISTORY  Social History     Socioeconomic  History    Marital status:     Number of children: 10    Years of education: High School   Tobacco Use    Smoking status: Former     Current packs/day: 0.00     Average packs/day: 1.5 packs/day for 12.0 years (18.0 ttl pk-yrs)     Types: Cigarettes     Start date:      Quit date:      Years since quittin.0     Passive exposure: Past    Smokeless tobacco: Never    Tobacco comments:     QUIT SMOKING    Vaping Use    Vaping status: Never Used   Substance and Sexual Activity    Alcohol use: No     Comment: caffeine - decaf coffee    Drug use: Never    Sexual activity: Defer         ALLERGIES  Codeine, Amitriptyline, Amoxicillin-pot clavulanate, Aspirin, Carisoprodol-aspirin-codeine, Iodinated contrast media, Latex, Naproxen, Nsaids, Soma compound with codeine [carisoprodol-aspirin-codeine], Sulfa antibiotics, and Tramadol      REVIEW OF SYSTEMS  Review of Systems  Included in HPI  All systems reviewed and negative except for those discussed in HPI.      PHYSICAL EXAM    I have reviewed the triage vital signs and nursing notes.    ED Triage Vitals [25]   Temp Heart Rate Resp BP SpO2   96.7 °F (35.9 °C) 61 20 117/62 100 %      Temp src Heart Rate Source Patient Position BP Location FiO2 (%)   -- -- -- -- --       Physical Exam  GENERAL: alert, no acute distress  SKIN: Warm, dry, no rashes  HENT: Normocephalic, atraumatic  EYES: no scleral icterus, normal conjunctivae  CV: Irregular rhythm, regular rate in the 60s.  Normal radial pulses  RESPIRATORY: No stridor.  Slight accessory muscle use is noted during conversation.  Breath sounds are diminished at the bases with few faint crackles and few scattered wheezes.  No coughing during my evaluation.  ABDOMEN: soft, nondistended, nontender  MUSCULOSKELETAL: no deformity, no asymmetry extremities, 1+ edema noted to the bilateral lower extremities  NEURO: alert, moves all extremities, follows commands      LAB RESULTS  Recent Results (from the  past 24 hours)   Comprehensive Metabolic Panel    Collection Time: 01/18/25  9:21 PM    Specimen: Blood   Result Value Ref Range    Glucose 124 (H) 65 - 99 mg/dL    BUN 27 (H) 8 - 23 mg/dL    Creatinine 1.34 (H) 0.57 - 1.00 mg/dL    Sodium 138 136 - 145 mmol/L    Potassium 5.5 (H) 3.5 - 5.2 mmol/L    Chloride 104 98 - 107 mmol/L    CO2 27.4 22.0 - 29.0 mmol/L    Calcium 10.4 8.6 - 10.5 mg/dL    Total Protein 7.5 6.0 - 8.5 g/dL    Albumin 3.4 (L) 3.5 - 5.2 g/dL    ALT (SGPT) 6 1 - 33 U/L    AST (SGOT) 12 1 - 32 U/L    Alkaline Phosphatase 66 39 - 117 U/L    Total Bilirubin 1.0 0.0 - 1.2 mg/dL    Globulin 4.1 gm/dL    A/G Ratio 0.8 g/dL    BUN/Creatinine Ratio 20.1 7.0 - 25.0    Anion Gap 6.6 5.0 - 15.0 mmol/L    eGFR 38.0 (L) >60.0 mL/min/1.73   Lactic Acid, Plasma    Collection Time: 01/18/25  9:21 PM    Specimen: Blood   Result Value Ref Range    Lactate 1.2 0.5 - 2.0 mmol/L   Procalcitonin    Collection Time: 01/18/25  9:21 PM    Specimen: Blood   Result Value Ref Range    Procalcitonin 0.08 0.00 - 0.25 ng/mL   BNP    Collection Time: 01/18/25  9:21 PM    Specimen: Blood   Result Value Ref Range    proBNP 1,025.0 0.0 - 1,800.0 pg/mL   High Sensitivity Troponin T    Collection Time: 01/18/25  9:21 PM    Specimen: Blood   Result Value Ref Range    HS Troponin T 23 (H) <14 ng/L   CBC Auto Differential    Collection Time: 01/18/25  9:21 PM    Specimen: Blood   Result Value Ref Range    WBC 3.69 3.40 - 10.80 10*3/mm3    RBC 3.58 (L) 3.77 - 5.28 10*6/mm3    Hemoglobin 13.0 12.0 - 15.9 g/dL    Hematocrit 38.7 34.0 - 46.6 %    .1 (H) 79.0 - 97.0 fL    MCH 36.3 (H) 26.6 - 33.0 pg    MCHC 33.6 31.5 - 35.7 g/dL    RDW 11.4 (L) 12.3 - 15.4 %    RDW-SD 44.6 37.0 - 54.0 fl    MPV 9.1 6.0 - 12.0 fL    Platelets 106 (L) 140 - 450 10*3/mm3   Manual Differential    Collection Time: 01/18/25  9:21 PM    Specimen: Blood   Result Value Ref Range    Neutrophil % 23.5 (L) 42.7 - 76.0 %    Lymphocyte % 53.1 (H) 19.6 - 45.3 %     Monocyte % 6.1 5.0 - 12.0 %    Eosinophil % 0.0 (L) 0.3 - 6.2 %    Basophil % 0.0 0.0 - 1.5 %    Atypical Lymphocyte % 17.3 (H) 0.0 - 5.0 %    Neutrophils Absolute 0.87 (L) 1.70 - 7.00 10*3/mm3    Lymphocytes Absolute 2.60 0.70 - 3.10 10*3/mm3    Monocytes Absolute 0.23 0.10 - 0.90 10*3/mm3    Eosinophils Absolute 0.00 0.00 - 0.40 10*3/mm3    Basophils Absolute 0.00 0.00 - 0.20 10*3/mm3    Anisocytosis Large/3+ None Seen    Macrocytes Large/3+ None Seen    Polychromasia Slight/1+ None Seen    WBC Morphology Normal Normal    Platelet Morphology Normal Normal   Respiratory Panel PCR w/COVID-19(SARS-CoV-2) [facility] In-House, NP Swab in UTM/VTM, 2 HR TAT - Swab, Nasopharynx    Collection Time: 01/18/25  9:26 PM    Specimen: Nasopharynx; Swab   Result Value Ref Range    ADENOVIRUS, PCR Not Detected Not Detected    Coronavirus 229E Not Detected Not Detected    Coronavirus HKU1 Not Detected Not Detected    Coronavirus NL63 Not Detected Not Detected    Coronavirus OC43 Not Detected Not Detected    COVID19 Not Detected Not Detected - Ref. Range    Human Metapneumovirus Not Detected Not Detected    Human Rhinovirus/Enterovirus Not Detected Not Detected    Influenza A PCR Not Detected Not Detected    Influenza B PCR Not Detected Not Detected    Parainfluenza Virus 1 Not Detected Not Detected    Parainfluenza Virus 2 Not Detected Not Detected    Parainfluenza Virus 3 Not Detected Not Detected    Parainfluenza Virus 4 Not Detected Not Detected    RSV, PCR Not Detected Not Detected    Bordetella pertussis pcr Not Detected Not Detected    Bordetella parapertussis PCR Not Detected Not Detected    Chlamydophila pneumoniae PCR Not Detected Not Detected    Mycoplasma pneumo by PCR Not Detected Not Detected   ECG 12 Lead Dyspnea    Collection Time: 01/18/25  9:37 PM   Result Value Ref Range    QT Interval 463 ms    QTC Interval 463 ms   High Sensitivity Troponin T 1Hr    Collection Time: 01/18/25 10:21 PM    Specimen:  Blood   Result Value Ref Range    HS Troponin T 20 (H) <14 ng/L    Troponin T Numeric Delta -3 ng/L    Troponin T % Delta -13 Abnormal if >/= 20%         RADIOLOGY  XR Chest 1 View    Result Date: 1/18/2025  SINGLE VIEW OF THE CHEST  HISTORY: Shortness of air  COMPARISON: December 20, 2024  FINDINGS: There is cardiomegaly. There may be some vascular congestion. There is elevation right hemidiaphragm, with associated bronchovascular crowding. No pneumothorax or pleural effusion is seen. Left-sided pacemaker is present. There is calcification of the aorta. There are background changes of COPD. There appears to be some scarring at the left lung base.      Cardiomegaly with suspected vascular congestion.  This report was finalized on 1/18/2025 9:46 PM by Dr. Luna Oleary M.D on Workstation: [id number]         MEDICATIONS GIVEN IN ER  Medications   sodium chloride 0.9 % flush 10 mL (has no administration in time range)   sodium chloride 0.9 % flush 10 mL (has no administration in time range)   sodium chloride 0.9 % flush 10 mL (has no administration in time range)   sodium chloride 0.9 % infusion 40 mL (has no administration in time range)   nitroglycerin (NITROSTAT) SL tablet 0.4 mg (has no administration in time range)   sennosides-docusate (PERICOLACE) 8.6-50 MG per tablet 2 tablet (has no administration in time range)     And   polyethylene glycol (MIRALAX) packet 17 g (has no administration in time range)     And   bisacodyl (DULCOLAX) EC tablet 5 mg (has no administration in time range)     And   bisacodyl (DULCOLAX) suppository 10 mg (has no administration in time range)   guaiFENesin (MUCINEX) 12 hr tablet 600 mg (has no administration in time range)   furosemide (LASIX) injection 40 mg (40 mg Intravenous Given 1/18/25 2150)         ORDERS PLACED DURING THIS VISIT:  Orders Placed This Encounter   Procedures    Respiratory Panel PCR w/COVID-19(SARS-CoV-2) [facility] In-House, NP Swab in  UTM/VTM, 2 HR TAT - Swab, Nasopharynx    XR Chest 1 View    Comprehensive Metabolic Panel    Lactic Acid, Plasma    Procalcitonin    BNP    High Sensitivity Troponin T    CBC Auto Differential    Manual Differential    High Sensitivity Troponin T 1Hr    CBC (No Diff)    Basic Metabolic Panel    High Sensitivity Troponin T    Diet: Cardiac; Healthy Heart (2-3 Na+); Fluid Consistency: Thin (IDDSI 0)    Monitor Blood Pressure    Continuous Pulse Oximetry    Vital Signs    Weigh Patient    Oral Care    Place Sequential Compression Device    Maintain Sequential Compression Device    Maintain IV Access    Telemetry - Place Orders & Notify Provider of Results When Patient Experiences Acute Chest Pain, Dysrhythmia or Respiratory Distress    May Be Off Telemetry for Tests    Up With Assistance    Daily Weights    Strict Intake & Output    Device Interrogation. Device type? PPM; Company to contact? [employer] [phone]; Device company contacted already? No    Code Status and Medical Interventions: No CPR (Do Not Attempt to Resuscitate); Full Support    Inpatient Cardiology Consult    ECG 12 Lead Dyspnea    ECG 12 Lead Dyspnea    Insert Peripheral IV    Insert Peripheral IV    Initiate ED Observation Status    CBC & Differential         OUTPATIENT MEDICATION MANAGEMENT:  Current Facility-Administered Medications Ordered in Epic   Medication Dose Route Frequency Provider Last Rate Last Admin    sennosides-docusate (PERICOLACE) 8.6-50 MG per tablet 2 tablet  2 tablet Oral BID PRN Jas Dunn APRN        And    polyethylene glycol (MIRALAX) packet 17 g  17 g Oral Daily PRN Jas Dunn APRN        And    bisacodyl (DULCOLAX) EC tablet 5 mg  5 mg Oral Daily PRN Jas Dunn APRN        And    bisacodyl (DULCOLAX) suppository 10 mg  10 mg Rectal Daily PRN Jas Dunn APRN        guaiFENesin (MUCINEX) 12 hr tablet 600 mg  600 mg Oral Q12H Jas Dunn APRN        nitroglycerin (NITROSTAT) SL tablet 0.4 mg  0.4 mg  Sublingual Q5 Min PRN Jas Dunn APRN        sodium chloride 0.9 % flush 10 mL  10 mL Intravenous PRN Todd Hopkins MD        sodium chloride 0.9 % flush 10 mL  10 mL Intravenous Q12H Jas Dunn APRN        sodium chloride 0.9 % flush 10 mL  10 mL Intravenous PRN Jas Dunn APRN        sodium chloride 0.9 % infusion 40 mL  40 mL Intravenous PRN Jas Dunn APRN         No current [facility]-ordered outpatient medications on file.         PROCEDURES  Procedures        PROGRESS, DATA ANALYSIS, CONSULTS, AND MEDICAL DECISION MAKING  All labs have been independently interpreted by me.  All radiology studies have been reviewed by me. All EKG's have been independently viewed and interpreted by me.  Discussion below represents my analysis of pertinent findings related to patient's condition, differential diagnosis, treatment plan and final disposition.    Differential diagnosis includes but is not limited to congestive heart failure exacerbation, COPD exacerbation, pneumothorax, pneumonia, pulmonary embolism, URI.    Clinical Scores:                   ED Course as of 01/19/25 0054   Sat Jan 18, 2025 2143 EKG         EKG time/Interp time: 2137/2145  Rhythm/Rate: Atrial fibrillation, ventricular paced complexes, 60 bpm  P waves and NH: None  QRS, axis: 175 ms, right bundle branch block  ST and T waves: Interpretation is limited because of the bundle branch block and paced rhythm..  Independently interpreted by me contemporaneously with treatment   [ANA]   2158 I independently interpreted the chest x-ray and my findings are: Cardiomegaly, central vascular congestion.  No pneumothorax. [ANA]   2250 RVP is unremarkable [ANA]   2303 BUN(!): 27 [ANA]   2303 Creatinine(!): 1.34 [ANA]   2305 proBNP: 1,025.0 [ANA]   2305 Procalcitonin: 0.08 [ANA]   2327 I discussed with SHAHID Xie, in the observation unit about this patient.  She agrees to admit the patient for further medical care needs tonight on behalf of Dr. Garber [ANA]       ED Course User Index  [ANA] Todd Hopkins MD             AS OF 00:54 EST VITALS:    BP - 120/67  HR - 57  TEMP - 96.7 °F (35.9 °C)  O2 SATS - 100%    COMPLEXITY OF CARE  The patient requires admission.      DIAGNOSIS  Final diagnoses:   Acute on chronic congestive heart failure, unspecified heart failure type   Chronic obstructive pulmonary disease, unspecified COPD type         DISPOSITION  ED Disposition       ED Disposition   Intended Admit    Condition   --    Comment   --                Please note that portions of this document were completed with a voice recognition program.    Note Disclaimer: At [facility], we believe that sharing information builds trust and better relationships. You are receiving this note because you recently visited [facility]. It is possible you will see health information before a provider has talked with you about it. This kind of information can be easy to misunderstand. To help you fully understand what it means for your health, we urge you to discuss this note with your provider.         Todd Hopkins MD  01/19/25 0054

## 2025-01-19 NOTE — PROGRESS NOTES
MD ATTESTATION NOTE     SHARED VISIT: This visit was performed by BOTH a physician and an APC. The substantive portion of the medical decision making was performed by this attesting physician who made or approved the management plan and takes responsibility for patient management. All studies in the APC note (if performed) were independently interpreted by me.  The BRENT and I have discussed this patient's history, physical exam, and treatment plan. I have reviewed the documentation and personally had a face to face interaction with the patient. I affirm the documentation and agree with the treatment and plan. I provided a substantive portion of the care of the patient.  I personally performed the physical exam in its entirety, and below are my findings.      Brief HPI: Patient states that her shortness of breath has improved.  Denies chest pain or fever.    PHYSICAL EXAM    GENERAL: Awake and alert.  No acute distress  HENT: nares patent  EYES: no scleral icterus  CV: Regular rhythm, normal rate  RESPIRATORY: normal effort, diminished breath sounds at both lung bases  ABDOMEN: soft  MUSCULOSKELETAL: There is 1+ edema in both lower extremities  NEURO: alert, moves all extremities, follows commands  PSYCH:  calm, cooperative  SKIN: warm, dry    Vital signs and nursing notes reviewed.        Plan: Troponin is flat x 3.  Creatinine is 1.05 this morning (improved from 1.34 yesterday).  She has had urine output of 2.85 L since admission.  Cardiology consult is pending

## 2025-01-19 NOTE — PLAN OF CARE
Goal Outcome Evaluation:  Plan of Care Reviewed With: patient        Progress: improving  Outcome Evaluation: Patient is alert and oriented times 4. On room air. Bp has been slightly low, notified Kiah Matthews PA-C, no new orders. Patient is hard of hearing. Patient self turns in the bed. Patient ambulated with physical therapy. Patient received IV lasix once this shift. Monitoring urine output per order. Cardiology following. Patient and daughter are agreeable with plan of care. Daily weights.

## 2025-01-19 NOTE — CONSULTS
Date of Consultation: 25    Referral Provider: Todd Hopkins MD     Reason for Consultation: Shortness of air     Encounter Provider: SHAHID Leo    Group of Service: [facility]     Patient Name: Brittany Villeda    :1935    Chief complaint: Cough and dyspnea      History of Present Illness:  Brittany Villeda is an 89 year old with a past medical history that is significant for hypertension, asthma, hypothyroidism, anemia, GERD, arthritis, and sleep apnea.     She was recently admitted at the end of 2024 with COVID-19. She was evaluated by ENT during that admission and was noted to have a stable mild airway stricture.     She presented to the ED with complaints of acute cough and dyspnea that has been worsening for the past 3 days. She denies fevers but has had generalized fatigue. When paramedics arrived to her home they noted her to be audibly wheezing. She is comfortable at rest but does note dyspnea with mild exertion.     Workup in the ED included: HS troponin 23, 20, then 24. proBNP 1025. Creatinine 1.34. Potassium 5.5. Respiratory viral panel negative. Chest xray with vascular congestion. EKG showed atrial fibrillation, rate 68 bpm.     She received 40 mg of IV furosemide last night with increase in urine output.     On exam today she is resting in bed.  She reports that her breathing has improved.  She still has a cough at times.  She has family at bedside.  She denies chest pain or discomfort, palpitations.    Echocardiogram 24    Left ventricular systolic function is normal. Left ventricular ejection fraction appears to be 61 - 65%.    Left ventricular diastolic function was indeterminate.    The right ventricular cavity is mildly dilated. Normal right ventricular systolic function noted.    The left atrial cavity is severely dilated.    The right atrial cavity is severely dilated.    Saline test results are negative.    Mild aortic valve  regurgitation is present.    Mild mitral valve regurgitation is present.    Mild to moderate tricuspid valve regurgitation is present.    Calculated right ventricular systolic pressure from tricuspid regurgitation is 38 mmHg.    There is no evidence of pericardial effusion.    Past Medical History:   Diagnosis Date    Abdominal aortic aneurysm     Anemia     Arrhythmia     Arthritis     Asthma     Bradycardia     CHF (congestive heart failure) 04/2023    Choledocholithiasis 05/09/2021    Colitis     Diastolic dysfunction     Essential hypertension 05/12/2016    GERD (gastroesophageal reflux disease)     Heart block     Hypertension     Hypertensive heart disease     Hyperthyroidism     REPORTS ENLARGED    Inguinal hernia     Kidney stone     Leukopenia     MGUS (monoclonal gammopathy of unknown significance)     Nephrolithiasis     Pacemaker     Paroxysmal atrial fibrillation     Peptic ulceration     Pressure ulcer     REPORTS ON COCCYX. INSTR TO NOTIFY DR BAIRES'S OFFICE    PSVT (paroxysmal supraventricular tachycardia)     Pulmonary hypertension     Rectal bleed     Sleep apnea     NO DEVICE    Subdural hematoma     Systemic hypertension     Trifascicular block     Ulcerative rectosigmoiditis without complication     Ventricular tachycardia     nonsustained         Past Surgical History:   Procedure Laterality Date    BACK SURGERY      lumbar fusion    DUSTY HOLE Left 08/08/2021    Procedure: Left-sided dusty holes for evacuation of subdural hematoma;  Surgeon: Gustavo Callaway MD;  Location: [facility] MAIN OR;  Service: Neurosurgery;  Laterality: Left;    CARDIAC ELECTROPHYSIOLOGY PROCEDURE N/A 11/07/2018    Procedure: Pacemaker DC new   [place];  Surgeon: Jesus Granda MD;  Location:  [facility] CATH INVASIVE LOCATION;  Service: Cardiology    CHOLECYSTECTOMY      COLONOSCOPY  06/16/2014    colitis, cryptitis,  tics, NBIH, TA w/low grade dysplasia    COLONOSCOPY N/A 10/28/2019    Procedure: COLONOSCOPY WITH COLD  AND HOT POLYPECTOMIES;  Surgeon: Micaela English MD;  [facility] END[facility]COPY;  Service: Gastroenterology    ENDOSCOPY N/A 05/13/2021    Procedure: ESOPHAGOGASTRODUODENOSCOPY with BX;  Surgeon: Stefan Mcfadden MD;  [facility] END[facility];  Service: Gastroenterology;  Laterality: N/A;  EPIGASTRIC PAIN  --DUODENAL ULCER, HEMORRHAGIC GASTRITIS, HIATAL HERNIA     ENDOSCOPY N/A 10/11/2022    Procedure: ESOPHAGOGASTRODUODENOSCOPY;  Surgeon: Micaela English MD;  [facility] END[facility];  Service: Gastroenterology;  Laterality: N/A;  PRE- MELENA  POST- ESOPHAGITIS    HEMORRHOIDECTOMY      HERNIA REPAIR      umbilical    HYSTERECTOMY      INGUINAL HERNIA REPAIR Left 9/1/2023    Procedure: INGUINAL HERNIA REPAIR LEFT;  Surgeon: Antonio Foster MD;  [place]  [facility] [place] [facility] [place]  Service: General;  Laterality: Left;    JOINT REPLACEMENT Bilateral     KNEES    MYOMECTOMY      PACEMAKER IMPLANTATION      SHOULDER SURGERY      SIGMOIDOSCOPY N/A 11/02/2022    Procedure: SIGMOIDOSCOPY FLEXIBLE WITH COLD BIOPSIES AND ASPIRATE;  Surgeon: Micaela English MD;  Location: [facility] ENDOSCOPY;  Service: Gastroenterology;  Laterality: N/A;  PRE- RECTAL BLEEDING  POST- HEMORRHOIDS, DIVERTICULOSIS, COLITIS    SINUS SURGERY      TOE NAIL AMPUTATION  03/04/2019    TONSILLECTOMY           Allergies   Allergen Reactions    Codeine Hallucinations     Tolerates hydromorphone    Amitriptyline Rash    Amoxicillin-Pot Clavulanate Rash    Aspirin Unknown - Low Severity     Patient doesn't know why    Carisoprodol-Aspirin-Codeine Palpitations    Iodinated Contrast Media Rash    Latex Rash    Naproxen Rash    Nsaids Unknown - Low Severity     UNSURE OF REACTION    Soma Compound With Codeine [Carisoprodol-Aspirin-Codeine] Rash    Sulfa Antibiotics Rash    Tramadol Palpitations     heart races          No current facility-administered medications on file prior to encounter.     Current Outpatient Medications on File Prior to Encounter    Medication Sig Dispense Refill    acetaminophen (TYLENOL) 500 MG tablet Take 1 tablet by mouth 2 (Two) Times a Day.      docusate sodium (COLACE) 100 MG capsule Take 1 capsule by mouth 2 (Two) Times a Day As Needed for Constipation. (Patient taking differently: Take 1 capsule by mouth 2 (Two) Times a Day.) 60 capsule 5    ELDERBERRY PO Take 2 tablets by mouth Daily.      lidocaine (XYLOCAINE) 5 % ointment APPLY 1 A THIN LAYER TOPICALLY DAILY AS DIRECTED 35.44 g 5    Magnesium Oxide -Mg Supplement 400 (240 Mg) MG tablet TAKE 1 TABLET BY MOUTH DAILY 90 tablet 1    mesalamine (APRISO) 0.375 g 24 hr capsule TAKE FOUR CAPSULES BY MOUTH DAILY 360 capsule 2    pantoprazole (PROTONIX) 40 MG EC tablet TAKE 1 TABLET BY MOUTH DAILY (Patient taking differently: Take 1 tablet by mouth Every Night.) 90 tablet 1    spironolactone (ALDACTONE) 25 MG tablet Take 0.5 tablets by mouth Daily.      valACYclovir (VALTREX) 1000 MG tablet Take 1 tablet by mouth Daily.      vitamin B-12 (CYANOCOBALAMIN) 1000 MCG tablet TAKE 1 TABLET BY MOUTH DAILY 90 tablet 1    furosemide (LASIX) 40 MG tablet Take 0.5 tablets by mouth Daily. Taking 20mg (Patient taking differently: Take 0.5 tablets by mouth Every Night. Taking 20mg) 90 tablet 1    Lidocaine 50mg suppository Insert 1 suppository into the rectum Daily As Needed (rectal pain). 14 suppository 0    mesalamine (CANASA) 1000 MG suppository UNWRAP AND INSERT 1 SUPPOSITORY RECTALLY EVERY NIGHT AT BEDTIME (Patient taking differently: Insert 1 suppository into the rectum At Night As Needed (pain/bleeding).) 30 suppository 0         Social History     Socioeconomic History    Marital status:     Number of children: 10    Years of education: High School   Tobacco Use    Smoking status: Former     Current packs/day: 0.00     Average packs/day: 1.5 packs/day for 12.0 years (18.0 ttl pk-yrs)     Types: Cigarettes     Start date:      Quit date:      Years since quittin.0     Passive  "exposure: Past    Smokeless tobacco: Never    Tobacco comments:     QUIT SMOKING 1965   Vaping Use    Vaping status: Never Used   Substance and Sexual Activity    Alcohol use: No     Comment: caffeine - decaf coffee    Drug use: Never    Sexual activity: Defer         Family History   Problem Relation Age of Onset    Diabetes Mother     Breast cancer Sister     Kidney cancer Sister     Heart disease Sister     Prostate cancer Brother     Prostate cancer Brother     Prostate cancer Brother     Malig Hyperthermia Neg Hx        REVIEW OF SYSTEMS:   12 point ROS was performed and is negative except as outlined in HPI       Objective:     Vitals:    01/19/25 0035 01/19/25 0327 01/19/25 0600 01/19/25 0700   BP: 120/67 105/57  99/63   BP Location: Left arm Left arm  Left arm   Patient Position: Lying Lying  Lying   Pulse:  58  60   Resp: 18 17  17   Temp: 97.8 °F (36.6 °C) 97.8 °F (36.6 °C)  97.8 °F (36.6 °C)   TempSrc: Oral Oral  Oral   SpO2: 100% 99%  99%   Weight: 78.9 kg (174 lb)  75.6 kg (166 lb 11.2 oz)    Height: 162.6 cm (64\")        Body mass index is 28.61 kg/m².  Flowsheet Rows      Flowsheet Row First Filed Value   Admission Height 162.6 cm (64\") Documented at 01/18/2025 2133   Admission Weight 78.9 kg (174 lb) Documented at 01/19/2025 0035              Physical Exam  Vitals reviewed.   Constitutional:       General: She is not in acute distress.  HENT:      Head: Normocephalic.      Nose: Nose normal.   Eyes:      Extraocular Movements: Extraocular movements intact.      Pupils: Pupils are equal, round, and reactive to light.   Cardiovascular:      Rate and Rhythm: Normal rate and regular rhythm.      Pulses: Normal pulses.      Heart sounds: Normal heart sounds. Heart sounds not distant. No murmur heard.     No friction rub. No gallop. No S3 or S4 sounds.   Pulmonary:      Effort: Pulmonary effort is normal.      Breath sounds: Normal breath sounds.   Abdominal:      General: Abdomen is flat. Bowel sounds are " normal.      Palpations: Abdomen is soft.      Tenderness: There is no abdominal tenderness.   Skin:     General: Skin is warm and dry.   Neurological:      General: No focal deficit present.      Mental Status: She is alert and oriented to person, place, and time. Mental status is at baseline.   Psychiatric:         Mood and Affect: Mood normal.         Behavior: Behavior normal.         Lab Review:                Results from last 7 days   Lab Units 01/19/25  0334   SODIUM mmol/L 139   POTASSIUM mmol/L 4.6   CHLORIDE mmol/L 105   CO2 mmol/L 27.0   BUN mg/dL 25*   CREATININE mg/dL 1.05*   GLUCOSE mg/dL 81   CALCIUM mg/dL 10.3     Results from last 7 days   Lab Units 01/19/25  0334 01/18/25  2221 01/18/25  2121   HSTROP T ng/L 24* 20* 23*     Results from last 7 days   Lab Units 01/19/25  0334   WBC 10*3/mm3 2.87*   HEMOGLOBIN g/dL 12.0   HEMATOCRIT % 36.3   PLATELETS 10*3/mm3 104*                       EKG:      Assessment/Plan:   Acute on chronic HFpEF  There was concern that she may not have been taking her medications regularly as her daughter was unable to assist with her medications due to being ill. Specifically that she was not taking her spironolactone and was instead taking additional potassium.   She received 40 mg of IV furosemide last night with good response.   Today she is feeling better though she does have mild dyspnea after talking with me for a little bit.  Will give another dose of IV furosemide and then I suspect she can transition back to her home medications.  Echocardiogram in April 2024 with preserved LVEF, indeterminate diastolic function, right ventricular dysfunction  GDMT: spironolactone 12.5 mg daily, furosemide 20 mg daily. No BB due to bradycardia at baseline. BP does not allow for A/A/A. Not a candidate for SGLT2 inhibitor due to history of severe vaginal yeast infections.   Acute kidney injury   Creatinine improved with diuresis   Hyperkalemia   Potassium on admission 5.5, improved  to 4.6 today.   Persistent atrial fibrillation  Rate controlled   Poor candidate for anticoagulation given history of GI bleed and decreased mobility   Pulmonary hypertension  Echocardiogram in April 2024 with RVSP 38 mmHg, RV dilated but with normal function.   Followed by Dr. Win   Obstructive sleep apnea  Untreated   Complete heart block status post permanent pacemaker placement   Placed in 2018   Dilated ascending aorta   CT angiogram of the chest in May 2024 showed ascending aorta measuring 4 cm.     Cardiology will follow, thank you for this consult.    Gabriela Dunham, APRN   01/19/25

## 2025-01-19 NOTE — THERAPY EVALUATION
Patient Name: Brittany Villeda  : 1935    MRN: [id number]                              Today's Date: 2025       Admit Date: 2025    Visit Dx:     ICD-10-CM ICD-9-CM   1. Acute on chronic congestive heart failure, unspecified heart failure type  I50.9 428.0   2. Chronic obstructive pulmonary disease, unspecified COPD type  J44.9 496     Patient Active Problem List   Diagnosis    Non-toxic multinodular goiter    Chronic midline low back pain with right-sided sciatica    Essential hypertension    Gastroesophageal reflux disease without esophagitis    Cataract    Pulmonary hypertension    Diastolic dysfunction    HUGO (obstructive sleep apnea)    Trifascicular block    MGUS (monoclonal gammopathy of unknown significance)    Ulcerative rectosigmoiditis without complication    Lichen sclerosus    Heart block    Sleep-related hypoxia    Bradycardia    History of atrial fibrillation    Pacemaker    Paroxysmal SVT (supraventricular tachycardia)    History of chest pain    Palpitations    Paroxysmal atrial fibrillation    Ascending aortic aneurysm    Mediastinal lymphadenopathy    Anemia    Obesity (BMI 30-39.9)    Generalized weakness    Dysautonomia    Hypercalcemia    Hyperparathyroidism    Choledocholithiasis    Duodenal ulcer    Hemorrhagic gastritis    Exertional dyspnea    COPD (chronic obstructive pulmonary disease)    Osteoarthritis of glenohumeral joint    ICH (intracerebral hemorrhage)    Lower GI bleed    Thrombocytopenia    Lichen sclerosus of female genitalia    Senile atrophic vaginitis    Left inguinal hernia    Vulvar pain    Bilateral lower extremity edema    Chronic heart failure with preserved ejection fraction    Acute renal insufficiency    Dysfunction of right cardiac ventricle    COVID-19 virus infection    Shortness of breath     Past Medical History:   Diagnosis Date    Abdominal aortic aneurysm     Anemia     Arrhythmia     Arthritis     Asthma     Bradycardia     CHF (congestive  heart failure) 04/2023    Choledocholithiasis 05/09/2021    Colitis     Diastolic dysfunction     Essential hypertension 05/12/2016    GERD (gastroesophageal reflux disease)     Heart block     Hypertension     Hypertensive heart disease     Hyperthyroidism     REPORTS ENLARGED    Inguinal hernia     Kidney stone     Leukopenia     MGUS (monoclonal gammopathy of unknown significance)     Nephrolithiasis     Pacemaker     Paroxysmal atrial fibrillation     Peptic ulceration     Pressure ulcer     REPORTS ON COCCYX. INSTR TO NOTIFY DR BAIRES'S OFFICE    PSVT (paroxysmal supraventricular tachycardia)     Pulmonary hypertension     Rectal bleed     Sleep apnea     NO DEVICE    Subdural hematoma     Systemic hypertension     Trifascicular block     Ulcerative rectosigmoiditis without complication     Ventricular tachycardia     nonsustained     Past Surgical History:   Procedure Laterality Date    BACK SURGERY      lumbar fusion    DUSTY HOLE Left 08/08/2021    Procedure: Left-sided dusty holes for evacuation of subdural hematoma;  Surgeon: Gustavo Callaway MD;  Location: [facility] MAIN OR;  Service: Neurosurgery;  Laterality: Left;    CARDIAC ELECTROPHYSIOLOGY PROCEDURE N/A 11/07/2018    Procedure: Pacemaker DC new   [place];  Surgeon: Jesus Granda MD;  Location: [facility] CATH INVASIVE LOCATION;  Service: Cardiology    CHOLECYSTECTOMY      COLONOSCOPY  06/16/2014    colitis, cryptitis,  tics, NBIH, TA w/low grade dysplasia    COLONOSCOPY N/A 10/28/2019    Procedure: COLONOSCOPY WITH COLD AND HOT POLYPECTOMIES;  Surgeon: Micaela English MD;  Location: [facility] ENDOSCOPY;  Service: Gastroenterology    ENDOSCOPY N/A 05/13/2021    Procedure: ESOPHAGOGASTRODUODENOSCOPY with BX;  Surgeon: Stefan Mcfadden MD;  Location: [facility] ENDOSCOPY;  Service: Gastroenterology;  Laterality: N/A;  EPIGASTRIC PAIN  --DUODENAL ULCER, HEMORRHAGIC GASTRITIS, HIATAL HERNIA     ENDOSCOPY N/A 10/11/2022    Procedure:  ESOPHAGOGASTRODUODENOSCOPY;  Surgeon: Micaela English MD;  Location: [facility] ENDOSCOPY;  Service: Gastroenterology;  Laterality: N/A;  PRE- MELENA  POST- ESOPHAGITIS    HEMORRHOIDECTOMY      HERNIA REPAIR      umbilical    HYSTERECTOMY      INGUINAL HERNIA REPAIR Left 9/1/2023    Procedure: INGUINAL HERNIA REPAIR LEFT;  Surgeon: Antonio Foster MD;  Location:  [facility];  Service: General;  Laterality: Left;    JOINT REPLACEMENT Bilateral     KNEES    MYOMECTOMY      PACEMAKER IMPLANTATION      SHOULDER SURGERY      SIGMOIDOSCOPY N/A 11/02/2022    Procedure: SIGMOIDOSCOPY FLEXIBLE WITH COLD BIOPSIES AND ASPIRATE;  Surgeon: Micaela English MD;  Location: [facility] ENDOSCOPY;  Service: Gastroenterology;  Laterality: N/A;  PRE- RECTAL BLEEDING  POST- HEMORRHOIDS, DIVERTICULOSIS, COLITIS    SINUS SURGERY      TOE NAIL AMPUTATION  03/04/2019    TONSILLECTOMY        General Information       Row Name 01/19/25 1515          Physical Therapy Time and Intention    Document Type evaluation  -SV     Mode of Treatment individual therapy;physical therapy  -SV       Row Name 01/19/25 1515          General Information    Patient Profile Reviewed yes  -SV     Prior Level of Function independent:;all household mobility  -SV     Existing Precautions/Restrictions fall  -SV     Barriers to Rehab none identified  -SV       Row Name 01/19/25 1515          Living Environment    People in Home child(donaldo), adult  -SV       Row Name 01/19/25 1515          Stairs Within Home, Primary    Stairs, Within Home, Primary pt has a ramp  -SV       Row Name 01/19/25 1515          Cognition    Orientation Status (Cognition) oriented x 3  -SV       Row Name 01/19/25 1515          Safety Issues/Impairments Affecting Functional Mobility    Impairments Affecting Function (Mobility) balance;endurance/activity tolerance  -SV               User Key  (r) = Recorded By, (t) = Taken By, (c) = Cosigned By      Initials Name Provider Type    NIDHI Travis  Aure BURLESON, PT Physical Therapist                   Mobility       Row Name 01/19/25 1751          Bed Mobility    Bed Mobility supine-sit  -SV     Supine-Sit [place] (Bed Mobility) minimum assist (75% patient effort);contact guard  -SV     Assistive Device (Bed Mobility) bed rails;head of bed elevated  -       Row Name 01/19/25 1751          Sit-Stand Transfer    Sit-Stand [place] (Transfers) contact guard;standby assist  -SV     Assistive Device (Sit-Stand Transfers) walker, front-wheeled  -SV       Row Name 01/19/25 1751          Gait/Stairs (Locomotion)    [place] Level (Gait) contact guard  -     Assistive Device (Gait) walker, front-wheeled  -SV     Distance in Feet (Gait) 150  flexed posture shuffle pattern, feet pronated and appear ext roated during stance  -               User Key  (r) = Recorded By, (t) = Taken By, (c) = Cosigned By      Initials Name Provider Type    SV Aure Travis, PT Physical Therapist                   Obj/Interventions       Row Name 01/19/25 1752          Range of Motion Comprehensive    General Range of Motion no range of motion deficits identified  -       Row Name 01/19/25 1752          Strength Comprehensive (MMT)    Comment, General Manual Muscle Testing (MMT) Assessment BUE/BLE appear grossly > 3/5 as obs with rom and mobility today  -       Row Name 01/19/25 1752          Balance    Balance Assessment sitting static balance;standing static balance  -SV     Static Sitting Balance modified independence;supervision  -SV     Static Standing Balance contact guard;standby assist  -SV     Position/Device Used, Standing Balance walker, rolling  -       Row Name 01/19/25 1752          Sensory Assessment (Somatosensory)    Sensory Assessment (Somatosensory) not tested;unable/difficult to assess  -               User Key  (r) = Recorded By, (t) = Taken By, (c) = Cosigned By      Initials Name Provider Type    SV Aure Travis, PT Physical Therapist                    Goals/Plan       Row Name 01/19/25 1753          Bed Mobility Goal 1 (PT)    Activity/Assistive Device (Bed Mobility Goal 1, PT) sit to supine/supine to sit  -SV     [place] Level/Cues Needed (Bed Mobility Goal 1, PT) independent  -SV     Time Frame (Bed Mobility Goal 1, PT) 10 days  -SV       Row Name 01/19/25 1753          Transfer Goal 1 (PT)    Activity/Assistive Device (Transfer Goal 1, PT) sit-to-stand/stand-to-sit;walker, rolling  -SV     [place] Level/Cues Needed (Transfer Goal 1, PT) independent  -SV     Time Frame (Transfer Goal 1, PT) 10 days  -SV       Row Name 01/19/25 1753          Gait Training Goal 1 (PT)    Activity/Assistive Device (Gait Training Goal 1, PT) gait (walking locomotion);walker, rolling  -SV     [place] Level (Gait Training Goal 1, PT) independent  -SV     Distance (Gait Training Goal 1, PT) 150  -SV     Time Frame (Gait Training Goal 1, PT) 10 days  -SV       Row Name 01/19/25 1753          Therapy Assessment/Plan (PT)    Planned Therapy Interventions (PT) balance training;bed mobility training;gait training;home exercise program;patient/family education;strengthening;stair training;transfer training  -SV               User Key  (r) = Recorded By, (t) = Taken By, (c) = Cosigned By      Initials Name Provider Type    Aure Montaño, PT Physical Therapist                   Clinical Impression       Row Name 01/19/25 1752          Pain    Pretreatment Pain Rating 0/10 - no pain  -SV     Posttreatment Pain Rating 0/10 - no pain  -SV     Pre/Posttreatment Pain Comment no sign or report of pain  -SV       Row Name 01/19/25 1752          Plan of Care Review    Plan of Care Reviewed With patient;child  -SV       Row Name 01/19/25 1752          Therapy Assessment/Plan (PT)    Patient/Family Therapy Goals Statement (PT) return home  -SV     Criteria for Skilled Interventions Met (PT) yes  -SV     Therapy Frequency (PT) 6 times/wk  -SV     Predicted Duration of  Therapy Intervention (PT) 1-2 weeks  -SV       Row Name 01/19/25 1752          Vital Signs    O2 Delivery Pre Treatment room air  -SV     O2 Delivery Intra Treatment room air  -SV     O2 Delivery Post Treatment room air  -SV     Pre Patient Position Supine  -SV     Intra Patient Position Standing  -SV     Post Patient Position Sitting  -SV       Row Name 01/19/25 1752          Positioning and Restraints    Pre-Treatment Position in bed  -SV     Post Treatment Position bsc  -SV     On BS commode notified nsg;sitting;call light within reach;encouraged to call for assist;with family/caregiver  -SV               User Key  (r) = Recorded By, (t) = Taken By, (c) = Cosigned By      Initials Name Provider Type    SV Aure Travis, PT Physical Therapist                   Outcome Measures       Row Name 01/19/25 1754          How much help from another person do you currently need...    Turning from your back to your side while in flat bed without using bedrails? 4  -SV     Moving from lying on back to sitting on the side of a flat bed without bedrails? 3  -SV     Moving to and from a bed to a chair (including a wheelchair)? 3  -SV     Standing up from a chair using your arms (e.g., wheelchair, bedside chair)? 3  -SV     Climbing 3-5 steps with a railing? 3  -SV     To walk in hospital room? 3  -SV     AM-PAC 6 Clicks Score (PT) 19  -SV               User Key  (r) = Recorded By, (t) = Taken By, (c) = Cosigned By      Initials Name Provider Type    SV Aure Travis, PT Physical Therapist                                 Physical Therapy Education       Title: PT OT SLP Therapies (Done)       Topic: Physical Therapy (Done)       Point: Mobility training (Done)       Learning Progress Summary            Patient Cheyanne, AUDELIA, VU,NR by SV at 1/19/2025 1755   Family Cheyanne E, VU,NR by  at 1/19/2025 1755                      Point: Home exercise program (Done)       Learning Progress Summary            Patient Cheyanne, E, VU,NR by  SV at 1/19/2025 1755   Family Eager, E, VU,NR by SV at 1/19/2025 1755                                      User Key       Initials Effective Dates Name Provider Type Discipline     07/11/23 -  Aure Travis, PT Physical Therapist PT                  PT Recommendation and Plan  Planned Therapy Interventions (PT): balance training, bed mobility training, gait training, home exercise program, patient/family education, strengthening, stair training, transfer training        Time Calculation:         PT Charges       Row Name 01/19/25 1800             Time Calculation    Start Time 1513  -      Stop Time 1530  -      Time Calculation (min) 17 min  -      PT Received On 01/19/25  -SV      PT - Next Appointment 01/20/25  -      PT Goal Re-Cert Due Date 01/29/25 -SV                User Key  (r) = Recorded By, (t) = Taken By, (c) = Cosigned By      Initials Name Provider Type    SV Aure Travis, PT Physical Therapist                  Therapy Charges for Today       Code Description Service Date Service Provider Modifiers Qty    [id number] HC PT EVAL LOW COMPLEXITY 2 1/19/2025 Aure Travis, PT GP 1            PT G-Codes  AM-PAC 6 Clicks Score (PT): 19  PT Discharge Summary  Anticipated Discharge Disposition (PT): home with assist, home with home health    Aure Travis, PT  1/19/2025

## 2025-01-20 ENCOUNTER — READMISSION MANAGEMENT (OUTPATIENT)
Dept: CALL CENTER | Facility: HOSPITAL | Age: OVER 89
End: 2025-01-20
Payer: MEDICARE

## 2025-01-20 VITALS
BODY MASS INDEX: 28.05 KG/M2 | DIASTOLIC BLOOD PRESSURE: 66 MMHG | RESPIRATION RATE: 16 BRPM | OXYGEN SATURATION: 92 % | SYSTOLIC BLOOD PRESSURE: 106 MMHG | TEMPERATURE: 97.7 F | HEART RATE: 60 BPM | WEIGHT: 164.3 LBS | HEIGHT: 64 IN

## 2025-01-20 LAB
ANION GAP SERPL CALCULATED.3IONS-SCNC: 7.7 MMOL/L (ref 5–15)
BUN SERPL-MCNC: 22 MG/DL (ref 8–23)
BUN/CREAT SERPL: 20.4 (ref 7–25)
CALCIUM SPEC-SCNC: 10.1 MG/DL (ref 8.6–10.5)
CHLORIDE SERPL-SCNC: 103 MMOL/L (ref 98–107)
CO2 SERPL-SCNC: 28.3 MMOL/L (ref 22–29)
CREAT SERPL-MCNC: 1.08 MG/DL (ref 0.57–1)
DEPRECATED RDW RBC AUTO: 44.9 FL (ref 37–54)
EGFRCR SERPLBLD CKD-EPI 2021: 49.2 ML/MIN/1.73
ERYTHROCYTE [DISTWIDTH] IN BLOOD BY AUTOMATED COUNT: 11.5 % (ref 12.3–15.4)
GLUCOSE SERPL-MCNC: 83 MG/DL (ref 65–99)
HCT VFR BLD AUTO: 38.5 % (ref 34–46.6)
HGB BLD-MCNC: 13.3 G/DL (ref 12–15.9)
MCH RBC QN AUTO: 36.6 PG (ref 26.6–33)
MCHC RBC AUTO-ENTMCNC: 34.5 G/DL (ref 31.5–35.7)
MCV RBC AUTO: 106.1 FL (ref 79–97)
PLATELET # BLD AUTO: 112 10*3/MM3 (ref 140–450)
PMV BLD AUTO: 10 FL (ref 6–12)
POTASSIUM SERPL-SCNC: 4.9 MMOL/L (ref 3.5–5.2)
RBC # BLD AUTO: 3.63 10*6/MM3 (ref 3.77–5.28)
SODIUM SERPL-SCNC: 139 MMOL/L (ref 136–145)
WBC NRBC COR # BLD AUTO: 2.96 10*3/MM3 (ref 3.4–10.8)

## 2025-01-20 PROCEDURE — 99214 OFFICE O/P EST MOD 30 MIN: CPT | Performed by: INTERNAL MEDICINE

## 2025-01-20 PROCEDURE — 80048 BASIC METABOLIC PNL TOTAL CA: CPT

## 2025-01-20 PROCEDURE — G0378 HOSPITAL OBSERVATION PER HR: HCPCS

## 2025-01-20 PROCEDURE — 85027 COMPLETE CBC AUTOMATED: CPT

## 2025-01-20 RX ORDER — FUROSEMIDE 20 MG/1
20 TABLET ORAL DAILY
Status: DISCONTINUED | OUTPATIENT
Start: 2025-01-20 | End: 2025-01-20 | Stop reason: HOSPADM

## 2025-01-20 RX ADMIN — VALACYCLOVIR HYDROCHLORIDE 1000 MG: 500 TABLET, FILM COATED ORAL at 12:41

## 2025-01-20 RX ADMIN — GUAIFENESIN 600 MG: 600 TABLET, EXTENDED RELEASE ORAL at 12:41

## 2025-01-20 RX ADMIN — MESALAMINE 1.5 G: 0.38 CAPSULE, EXTENDED RELEASE ORAL at 12:41

## 2025-01-20 RX ADMIN — Medication 1000 MCG: at 12:41

## 2025-01-20 RX ADMIN — SPIRONOLACTONE 12.5 MG: 25 TABLET ORAL at 12:42

## 2025-01-20 RX ADMIN — DOCUSATE SODIUM 100 MG: 100 CAPSULE, LIQUID FILLED ORAL at 12:41

## 2025-01-20 RX ADMIN — FUROSEMIDE 20 MG: 20 TABLET ORAL at 12:41

## 2025-01-20 NOTE — PLAN OF CARE
Goal Outcome Evaluation:  Plan of Care Reviewed With: patient, child        Progress: improving  Outcome Evaluation: Pt reports feelings much better, denies shortness of breath or cough overnight. Remains on room air. Pt hopeful to discharge today. Cardiology and PT following.

## 2025-01-20 NOTE — PLAN OF CARE
Goal Outcome Evaluation:      Pt d/c'd per provider order. Pt is A&Ox4, VSS, respirations regular, and in NAD upon discharge. Pt and family educated on medication administration, potential side-effects, and provided with information regarding follow-up care. Pt and family verbalized understanding. Pt/family w/ no further questions at this time. All belongings sent with patient.

## 2025-01-20 NOTE — OUTREACH NOTE
Prep Survey      Flowsheet Row Responses   [facility] patient discharged from? [place]   Is LACE score < 7 ? No   Eligibility [facility]   Date of Admission 01/18/25   Date of Discharge 01/20/25   Discharge Disposition Home or Self Care   Discharge diagnosis Shortness of breath, CHF exacerbation   Does the patient have one of the following disease processes/diagnoses(primary or secondary)? CHF   Does the patient have Home health ordered? No   Is there a DME ordered? No   Prep survey completed? Yes            Shania POTTS - Registered Nurse

## 2025-01-20 NOTE — PROGRESS NOTES
MD ATTESTATION NOTE - observation progress    The BRENT and I have discussed this patient's history, physical exam, and treatment plan.  I have reviewed the documentation and personally had a face to face interaction with the patient. I affirm the documentation and agree with the treatment and plan.  The attached note describes my personal findings.        SHARED APC FACE TO FACE: I performed a substantive part of the MDM during the patient's E/M visit. I personally evaluated and examined the patient. I personally made or approved the documented management plan and acknowledge its risk of complications.        Brief HPI: Patient admitted the observation unit for congestive heart failure.  Patient states she feels significantly better.  Has no shortness of breath.  Patient has had good diuresis again yesterday.  Kidney function unchanged.        GENERAL: no acute distress  HENT: nares patent  EYES: no scleral icterus  CV: regular rhythm, normal rate  RESPIRATORY: normal effort.  Lungs clear bilaterally  ABDOMEN: soft  MUSCULOSKELETAL: no deformity  NEURO: alert, moves all extremities, follows commands  PSYCH:  calm, cooperative  SKIN: warm, dry    Vital signs and nursing notes reviewed.        Plan: Patient has been seen by cardiology.  Will be discharged home.

## 2025-01-20 NOTE — PROGRESS NOTES
LOS: 0 days   Patient Care Team:  Mega Esparza MD as PCP - General (Family Medicine)  Micaela English MD as Consulting Physician (Gastroenterology)  Mary Grace Culp MD as Consulting Physician (Cardiology)  Jp Krause Jr., MD as Consulting Physician (Hematology and Oncology)  Yasmeen Pichardo [facility] as Pharmacist  Micaela English MD as Referring Physician (Gastroenterology)  Devon Valadez MD as Consulting Physician (Hematology and Oncology)  Rusty Interiano, JamarD as Pharmacist (Pharmacy)  Catie Jules PA as Physician Assistant (Gastroenterology)  Antonio Foster MD as Surgeon (General Surgery)  Valeria Darling APRN as Nurse Practitioner (Obstetrics and Gynecology)  Mark Win MD PhD as Consulting Physician (Cardiology)  Mark Win MD PhD as Referring Physician (Cardiology)  Carmelo Coates MD as Consulting Physician (Hematology and Oncology)  Carmelo Coates MD as Consulting Physician (Hematology and Oncology)    Chief Complaint: Follow-up acute on chronic diastolic CHF, acute on chronic right-sided CHF, persistent atrial fibrillation.     Interval History: Feels better in general.  No chest pain or shortness of breath.  Diuresed well.    Vital Signs:  Temp:  [97.5 °F (36.4 °C)-97.8 °F (36.6 °C)] 97.7 °F (36.5 °C)  Heart Rate:  [60-61] 60  Resp:  [16-18] 16  BP: ()/(44-73) 106/66    Intake/Output Summary (Last 24 hours) at 1/20/2025 1152  Last data filed at 1/20/2025 0058  Gross per 24 hour   Intake 530 ml   Output 2200 ml   Net -1670 ml       Physical Exam:   General Appearance:    No acute distress, alert.   Lungs:     Clear to auscultation bilaterally     Heart:    Regular rhythm and normal rate (paced).  II/VI SM LLSB.    Abdomen:     Soft, nontender, nondistended.    Extremities:   Trace pedal edema.     Results Review:    Results from last 7 days   Lab Units 01/20/25  0544   SODIUM mmol/L 139   POTASSIUM mmol/L 4.9   CHLORIDE mmol/L 103   CO2 mmol/L  28.3   BUN mg/dL 22   CREATININE mg/dL 1.08*   GLUCOSE mg/dL 83   CALCIUM mg/dL 10.1     Results from last 7 days   Lab Units 01/19/25  0334 01/18/25  2221 01/18/25  2121   HSTROP T ng/L 24* 20* 23*     Results from last 7 days   Lab Units 01/20/25  0544   WBC 10*3/mm3 2.96*   HEMOGLOBIN g/dL 13.3   HEMATOCRIT % 38.5   PLATELETS 10*3/mm3 112*                       I reviewed the patient's new clinical results.        Assessment:  1.  Acute on chronic diastolic CHF  2.  Acute on chronic right-sided CHF  3.  Pulmonary hypertension  4.  COPD without acute exacerbation  5.  Persistent atrial fibrillation  6.  Status post [employer] dual-chamber pacemaker in 2018  7.  Obstructive sleep apnea  8.  History of GI bleeding  9.  Hypertension  10.  Thrombocytopenia, chronic  11.  Immobility    Plan:  -She looks and feels much better today.  She diuresed well after IV Lasix yesterday.    -Resume her normal medications.  She is on Lasix 20 mg/day and spironolactone 12.5 mg/day.  She is not on an SGLT2 inhibitor secondary to history of significant vaginal yeast infections.    -She is stable from a cardiac standpoint for discharge on her home medications.  Discussed with her daughter at bedside.  Also discussed with Dr. Burnett.      Franklin Galvin MD  01/20/25  11:52 EST

## 2025-01-20 NOTE — DISCHARGE SUMMARY
ED OBSERVATION PROGRESS/DISCHARGE SUMMARY    Date of Admission: 1/18/2025   LOS: 0 days   PCP: Mega Esparza MD    Final Diagnosis CHF exacerbation      Subjective     Hospital Outcome:     Brittany Villeda is a 89 y.o. female with medical history significant for HFpEF, GERD, pulmonary hypertension, asthma, MGUS, HUGO, A-fib, trifascicular block with pacemaker placement presenting with worsening dyspnea and persistent productive cough for 3 days.  She denies fevers or sick contact.  She is comfortable at rest however becomes dyspneic with minimal exertion including talking and eating.  Her daughter reports she is prescribed Lasix 20 mg daily and spironolactone 12.5 mg daily and was recently taken off of her potassium supplement due to hyperkalemia.  Normally she prepares her own medicine and her daughter checks in for her, however, this past week her daughter has been sick and she thinks Ms. Villeda may have accidentally taken some potassium supplement and forgot to take her spironolactone.      She follows with Dr. Culp for cardiology care and sees Dr. Win for diuretic management in the heart failure clinic.    1/19/2025: Patient reports that she is feeling better than on admission.  Patient has had good diuresis since admission.  Labs improving.  Cardiology has seen and evaluated the patient and recommending that the patient get another round of IV diuretics this morning and likely be able to switch her back to her home diuretics tomorrow.  Will reevaluate in the a.m.     01/20/25  Serum creatinine and potassium normalizing.  3.3 L urine output since starting diuresis on admission.  Patient restarted on home p.o. diuretics.  Cardiology saw and reevaluated the patient this morning and no further inpatient treatment recommended at this time.  Patient okay for discharge home from cardiology standpoint.  All labs and imaging findings discussed with patient as well as specialist recommendations patient is  agreeable for discharge home at this time.    Review of Systems:   Constitutional:  No weight changes, fever, or chills. No night sweats, no fatigue, no malaise.    Cardiovascular:  No chest pain, no palpitations, no edema.      Respiratory:  No cough, no smoke exposure, no dyspnea, no orthopnea.   Gastrointestinal:  No nausea, vomiting, or diarrhea. No constipation, or GI discomfort. No reflux pain, no anorexia, no dysphagia. No hematochezia or melena.    Neuro:  No weakness, no numbness, no paresthesias, no loss of consciousness, no syncope, no dizziness, no headache.     Objective   Physical Exam:   Constitutional: Awake, alert. Well developed for age. Nontoxic appearing.   Eyes: PERRL, sclerae anicteric, no conjunctival injection.  EOMI  HENT: NCAT, mucous membranes moist, hard of hearing  Neck: Supple, nontender, trachea midline  Respiratory: Clear to auscultation bilaterally, nonlabored respirations on room air  Cardiovascular: RRR, no murmurs, palpable pedal pulses bilaterally. No appreciable edema.   Gastrointestinal: Positive bowel sounds, soft, nontender, not distended.   Musculoskeletal: No bilateral ankle edema, no clubbing or cyanosis to extremities. No obvious deformities.   Psychiatric: Appropriate affect, cooperative. Converses appropriately for age.   Neurologic: Oriented x 3, strength symmetric in all extremities. Cranial nerves grossly intact to confrontation, speech clear  Skin: No rashes, skin intact.     Results Review:    I have reviewed the labs, radiology results and diagnostic studies.    Results from last 7 days   Lab Units 01/19/25  0334   WBC 10*3/mm3 2.87*   HEMOGLOBIN g/dL 12.0   HEMATOCRIT % 36.3   PLATELETS 10*3/mm3 104*     Results from last 7 days   Lab Units 01/19/25  0334 01/18/25  2121   SODIUM mmol/L 139 138   POTASSIUM mmol/L 4.6 5.5*   CHLORIDE mmol/L 105 104   CO2 mmol/L 27.0 27.4   BUN mg/dL 25* 27*   CREATININE mg/dL 1.05* 1.34*   CALCIUM mg/dL 10.3 10.4   BILIRUBIN mg/dL   --  1.0   ALK PHOS U/L  --  66   ALT (SGPT) U/L  --  6   AST (SGOT) U/L  --  12   GLUCOSE mg/dL 81 124*     Imaging Results (Last 24 Hours)       ** No results found for the last 24 hours. **            I have reviewed the medications.     Discharge Medications        ASK your doctor about these medications        Instructions Start Date   acetaminophen 500 MG tablet  Commonly known as: TYLENOL   500 mg, Oral, 2 Times Daily      docusate sodium 100 MG capsule  Commonly known as: COLACE   100 mg, Oral, 2 Times Daily PRN      ELDERBERRY PO   2 tablets, Daily      furosemide 40 MG tablet  Commonly known as: LASIX   20 mg, Oral, Daily, Taking 20mg      lidocaine 5 % ointment  Commonly known as: XYLOCAINE   APPLY 1 A THIN LAYER TOPICALLY DAILY AS DIRECTED      Lidocaine 50 MG rectal suppository   50 mg, Rectal, Daily PRN      Magnesium Oxide -Mg Supplement 400 (240 Mg) MG tablet   400 mg, Oral, Daily      mesalamine 1000 MG suppository  Commonly known as: CANASA   UNWRAP AND INSERT 1 SUPPOSITORY RECTALLY EVERY NIGHT AT BEDTIME      mesalamine 0.375 g 24 hr capsule  Commonly known as: APRISO   1.5 g, Oral, Daily      pantoprazole 40 MG EC tablet  Commonly known as: PROTONIX   40 mg, Oral, Daily      spironolactone 25 MG tablet  Commonly known as: ALDACTONE   12.5 mg, Oral, Daily      valACYclovir 1000 MG tablet  Commonly known as: VALTREX   1,000 mg, Daily      vitamin B-12 1000 MCG tablet  Commonly known as: CYANOCOBALAMIN   1,000 mcg, Oral, Daily              ---------------------------------------------------------------------------------------------  Assessment & Plan   Assessment/Problem List    Shortness of breath      Plan:    Shortness of air  HFpEF  -Troponin 23, 28.  EKG shows V paced rhythm with underlying A-fib  -BNP 1025  -RVP negative  -PPM interrogation was unremarkable  -Chest x-ray show cardiomegaly with vascular congestion  -3.3 L urine output since admission  -Patient restarted on her home diuretics on  1/20/2025 AM  -Cardiology saw and reevaluated the patient today and no further inpatient testing or treatment recommended at this time.  Patient appears euvolemic at this time.  Patient okay for discharge from cardiology standpoint.     Hyperkalemia  -Resolved  -Surveillance labs     RACH  -Resolved  -Surveillance labs    Disposition: Discharge to home    Follow-up after Discharge: PCP in 1 to 2 weeks, cardiology in 2 to 3 weeks    This note will serve as a discharge summary    Kiah Matthews PA-C 01/20/25 08:13 EST    I have worn appropriate PPE during this patient encounter, sanitized my hands both with entering and exiting patient's room.      35 minutes has been spent by [facility] providers in the care of this patient while under observation status

## 2025-01-21 ENCOUNTER — TRANSITIONAL CARE MANAGEMENT TELEPHONE ENCOUNTER (OUTPATIENT)
Dept: CALL CENTER | Facility: HOSPITAL | Age: OVER 89
End: 2025-01-21
Payer: MEDICARE

## 2025-01-21 NOTE — OUTREACH NOTE
Call Center TCM Note      Flowsheet Row Responses   [facility] patient discharged from? [place]   Does the patient have one of the following disease processes/diagnoses(primary or secondary)? CHF   TCM attempt successful? Yes   Call start time 0858   Call end time 0900   Discharge diagnosis Shortness of breath, CHF exacerbation   Person spoke with today (if not patient) and relationship Daughter- Amina Abebe reviewed with patient/caregiver? Yes   Is the patient having any side effects they believe may be caused by any medication additions or changes? No   Does the patient have all medications ordered at discharge? Yes   Is the patient taking all medications as directed (includes completed medication regime)? Yes   Comments Pt will call to schedule.   Does the patient have an appointment with their PCP within 7-14 days of discharge? No   Nursing Interventions Patient declined scheduling/rescheduling appointment at this time   Has home health visited the patient within 72 hours of discharge? N/A   Pulse Ox monitoring Intermittent   O2 Sat comments 98% on RA   O2 Sat: education provided Monitoring frequency, Sat levels   O2 Sat education comments Maintain oxygen of 90% or above. If drops below 90% and does not rebound with supplemental oxygen, relaxation and deep breathing needs to seek medical attention immediately.   Psychosocial issues? No   Did the patient receive a copy of their discharge instructions? Yes   Nursing interventions Reviewed instructions with patient   What is the patient's perception of their health status since discharge? Improving   If the patient is a current smoker, are they able to teach back resources for cessation? Not a smoker   Is the patient/caregiver able to teach back the hierarchy of who to call/visit for symptoms/problems? PCP, Specialist, Home health nurse, Urgent Care, ED, 911 Yes   TCM call completed? Yes   Wrap up additional comments Pt doing well. Declined CHF clinic  number says already goes to one.   Call end time 0900             Connie Pratt RN    1/21/2025, 09:02 EST

## 2025-01-27 ENCOUNTER — TELEPHONE (OUTPATIENT)
Dept: FAMILY MEDICINE CLINIC | Facility: CLINIC | Age: OVER 89
End: 2025-01-27
Payer: MEDICARE

## 2025-01-29 ENCOUNTER — HOSPITAL ENCOUNTER (OUTPATIENT)
Dept: CARDIOLOGY | Facility: HOSPITAL | Age: OVER 89
Discharge: HOME | End: 2025-01-29
Admitting: NURSE PRACTITIONER
Payer: MEDICARE

## 2025-01-29 ENCOUNTER — READMISSION MANAGEMENT (OUTPATIENT)
Dept: CALL CENTER | Facility: HOSPITAL | Age: OVER 89
End: 2025-01-29
Payer: MEDICARE

## 2025-01-29 VITALS
HEART RATE: 60 BPM | BODY MASS INDEX: 26.87 KG/M2 | SYSTOLIC BLOOD PRESSURE: 122 MMHG | DIASTOLIC BLOOD PRESSURE: 68 MMHG | WEIGHT: 157.4 LBS | HEIGHT: 64 IN | OXYGEN SATURATION: 97 %

## 2025-01-29 DIAGNOSIS — I27.20 PULMONARY HYPERTENSION: Primary | ICD-10-CM

## 2025-01-29 DIAGNOSIS — I50.32 CHRONIC HEART FAILURE WITH PRESERVED EJECTION FRACTION: ICD-10-CM

## 2025-01-29 DIAGNOSIS — I51.9 DYSFUNCTION OF RIGHT CARDIAC VENTRICLE: ICD-10-CM

## 2025-01-29 PROCEDURE — G0463 HOSPITAL OUTPT CLINIC VISIT: HCPCS

## 2025-01-29 RX ORDER — TACROLIMUS 1 MG/G
OINTMENT TOPICAL
COMMUNITY
Start: 2025-01-20

## 2025-01-29 RX ORDER — DOCUSATE SODIUM 100 MG/1
100 CAPSULE, LIQUID FILLED ORAL DAILY
COMMUNITY

## 2025-01-29 NOTE — OUTREACH NOTE
CHF Week 2 Survey      Flowsheet Row Responses   [facility] patient discharged from? [place]   Does the patient have one of the following disease processes/diagnoses(primary or secondary)? CHF   Week 2 attempt successful? Yes   Call start time 1238   Call end time 1240   Discharge diagnosis Shortness of breath, CHF exacerbation   Person spoke with today (if not patient) and relationship Daughter- Amina   Is the patient taking all medications as directed (includes completed medication regime)? Yes   Medication comments No changes   Does the patient have a primary care provider?  Yes   Has the patient kept scheduled appointments due by today? Yes   Comments Just left HF clinic   Pulse Ox monitoring Intermittent   O2 Sat comments 97% on RA   Psychosocial issues? No   What is the patient's perception of their health status since discharge? Improving   If the patient is a current smoker, are they able to teach back resources for cessation? Not a smoker   CHF Zone this Call Green Zone   Green Zone Patient reports doing well   CHF Week 2 call completed? Yes   Graduated/Revoked comments Doing well with no questions or needs at this time.   Call end time 1240            Ne MCCLAIN - Licensed Nurse

## 2025-01-29 NOTE — LETTER
January 29, 2025     Mary Grace Culp MD  [address] [address]    Patient: Brittany Villeda   YOB: 1935   Date of Visit: 1/29/2025     Dear Mary Grace Culp MD:       Thank you for referring Brittany Villeda to me for evaluation. Below are the relevant portions of my assessment and plan of care.    If you have questions, please do not hesitate to call me. I look forward to following Brittany along with you.         Sincerely,        SHAHID Bazan        CC: MD Obed Calvert, SHAHID Holly  01/29/25 1147  Signed    [facility]    Franklin Galvin MD  [address] [phone]    Thank you for asking me to see Brittany Villeda.     Congestive Heart Failure  Pertinent negatives include no chest pain or shortness of breath.       1.  Pulmonary hypertension  2.  HFpEF    Subjective     Brittany Cliftons a 89 y.o. female who presents today for pulmonary hypertension, HFpEF.   The patient's most recent 2D TTE showed evidence of pulmonary hypertension and right-sided involvement.  She does have biatrial dilatation.  She was diagnosed with HUGO. She does not use a CPAP. She was diagnosed with complete heart block in the past and required placement of a PPM--Status post [employer] dual-chamber pacemaker in 2018 .  She also was diagnosed with COPD.  She does not routinely follow with pulmonary medicine.  She also was diagnosed with MGUS.      Since last visit patient had admission in December 20, 2024 with COVID-19.  She was treated with Paxlovid and was able to be discharged.    Patient had hospitalization 1/18/2025 to 1/20/2025.  Acute decompensated Heart falure Hospitalization.  She received IV Lasix for diuresis with improvement.  She was discharged on home medications-Lasix 20 mg daily, spironolactone 12.5 mg daily. Feels like this was due to not having medicaitons.     Stable exercise  intolerance.  Denies any abdominal distention.    She does report exertional dyspnea/shortness of breath.   Denies any PND.         Review of Systems - Review of Systems   Constitutional: Negative for weight gain and weight loss.   Cardiovascular:  Negative for chest pain, dyspnea on exertion, leg swelling, orthopnea, paroxysmal nocturnal dyspnea and syncope.   Respiratory:  Negative for cough, shortness of breath, sleep disturbances due to breathing and snoring.    Gastrointestinal:  Negative for bloating.   Neurological:  Negative for dizziness, light-headedness and weakness.          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-sided sciatica   • Essential hypertension   • Gastroesophageal reflux disease without esophagitis   • Cataract   • Pulmonary hypertension   • Diastolic dysfunction   • HUGO (obstructive sleep apnea)   • Trifascicular block   • MGUS (monoclonal gammopathy of unknown significance)   • Ulcerative rectosigmoiditis without complication   • Lichen sclerosus   • Heart block   • Sleep-related hypoxia   • Bradycardia   • History of atrial fibrillation   • Pacemaker   • Paroxysmal SVT (supraventricular tachycardia)   • History of chest pain   • Palpitations   • Paroxysmal atrial fibrillation   • Ascending aortic aneurysm   • Mediastinal lymphadenopathy   • Anemia   • Obesity (BMI 30-39.9)   • Generalized weakness   • Dysautonomia   • Hypercalcemia   • Hyperparathyroidism   • Choledocholithiasis   • Duodenal ulcer   • Hemorrhagic gastritis   • Exertional dyspnea   • COPD (chronic obstructive pulmonary disease)   • Osteoarthritis of glenohumeral joint   • ICH (intracerebral hemorrhage)   • Lower GI bleed   • Thrombocytopenia   • Lichen sclerosus of female genitalia   • Senile atrophic vaginitis   • Left inguinal hernia   • Vulvar pain   • Bilateral lower extremity edema   • Chronic heart failure with preserved ejection fraction    • Acute renal insufficiency   • Dysfunction of right cardiac ventricle   • COVID-19 virus infection   • Shortness of breath     family history includes Breast cancer in her sister; Diabetes in her mother; Heart disease in her sister; Kidney cancer in her sister; Prostate cancer in her brother, brother, and brother.   reports that she quit smoking about 60 years ago. Her smoking use included cigarettes. She started smoking about 72 years ago. She has a 18 pack-year smoking history. She has been exposed to tobacco smoke. She has never used smokeless tobacco. She reports that she does not drink alcohol and does not use drugs.  Allergies   Allergen Reactions   • Codeine Hallucinations     Tolerates hydromorphone   • Amitriptyline Rash   • Amoxicillin-Pot Clavulanate Rash   • Aspirin Unknown - Low Severity     Patient doesn't know why   • Carisoprodol-Aspirin-Codeine Palpitations   • Iodinated Contrast Media Rash   • Latex Rash   • Naproxen Rash   • Nsaids Unknown - Low Severity     UNSURE OF REACTION   • Soma Compound With Codeine [Carisoprodol-Aspirin-Codeine] Rash   • Sulfa Antibiotics Rash   • Tramadol Palpitations     heart races      Physical Activity: Sufficiently Active (4/26/2024)    Exercise Vital Sign    • Days of Exercise per Week: 5 days    • Minutes of Exercise per Session: 30 min          Current Outpatient Medications:   •  acetaminophen (TYLENOL) 500 MG tablet, Take 1 tablet by mouth 2 (Two) Times a Day., Disp: , Rfl:   •  docusate sodium (COLACE) 100 MG capsule, Take 1 capsule by mouth Daily., Disp: , Rfl:   •  ELDERBERRY PO, Take 2 tablets by mouth Daily., Disp: , Rfl:   •  furosemide (LASIX) 40 MG tablet, Take 0.5 tablets by mouth Daily. Taking 20mg, Disp: 90 tablet, Rfl: 1  •  lidocaine (XYLOCAINE) 5 % ointment, APPLY 1 A THIN LAYER TOPICALLY DAILY AS DIRECTED, Disp: 35.44 g, Rfl: 5  •  Magnesium Oxide -Mg Supplement 400 (240 Mg) MG tablet, TAKE 1 TABLET BY MOUTH DAILY, Disp: 90 tablet, Rfl: 1  •   mesalamine (APRISO) 0.375 g 24 hr capsule, TAKE FOUR CAPSULES BY MOUTH DAILY, Disp: 360 capsule, Rfl: 2  •  mesalamine (CANASA) 1000 MG suppository, UNWRAP AND INSERT 1 SUPPOSITORY RECTALLY EVERY NIGHT AT BEDTIME (Patient taking differently: Insert 1 suppository into the rectum At Night As Needed (pain/bleeding).), Disp: 30 suppository, Rfl: 0  •  pantoprazole (PROTONIX) 40 MG EC tablet, TAKE 1 TABLET BY MOUTH DAILY, Disp: 90 tablet, Rfl: 1  •  spironolactone (ALDACTONE) 25 MG tablet, Take 0.5 tablets by mouth Daily., Disp: , Rfl:   •  tacrolimus (PROTOPIC) 0.1 % ointment, , Disp: , Rfl:   •  valACYclovir (VALTREX) 1000 MG tablet, Take 1 tablet by mouth Daily., Disp: , Rfl:   •  vitamin B-12 (CYANOCOBALAMIN) 1000 MCG tablet, TAKE 1 TABLET BY MOUTH DAILY, Disp: 90 tablet, Rfl: 1    Objective  Vital Sign Review:   Vitals:    01/29/25 1106   BP: 122/68   Pulse: 60   SpO2: 97%     Body mass index is 27 kg/m².      01/29/25  1106   Weight: 71.4 kg (157 lb 6.4 oz)       Physical Exam:  Constitutional:       Appearance: Normal and healthy appearance.   Neck:      Vascular: No carotid bruit or JVD. JVD normal.   Pulmonary:      Effort: Pulmonary effort is normal.      Breath sounds: Normal breath sounds.   Cardiovascular:      PMI at left midclavicular line. Normal rate. Regular rhythm.   Pulses:     Intact distal pulses.   Edema:     Peripheral edema absent.   Abdominal:      Palpations: Abdomen is soft.      Tenderness: There is no abdominal tenderness.   Skin:     General: Skin is warm and dry.   Neurological:      General: No focal deficit present.      Mental Status: Alert and oriented to person, place and time.   Psychiatric:         Behavior: Behavior is cooperative.          DATA REVIEWED:   EKG:  ECG 12 Lead     Date/Time: 8/12/2024 1:25 PM  Performed by: Sydney Gilliam APRN     Authorized by: Sydney Gilliam APRN  Comparison: compared with previous ECG   Similar to previous ECG  Rhythm: atrial  fibrillation and paced  Rate: normal  BPM: 67  Conduction: right bundle branch block  QRS axis: normal  Comments: Similar to prior    ---------------------------------------------------  ECHO:    Results for orders placed during the hospital encounter of 04/23/24    Adult Transthoracic Echo Complete W/ Cont if Necessary Per Protocol    Interpretation Summary  •  Left ventricular systolic function is normal. Left ventricular ejection fraction appears to be 61 - 65%.  •  Left ventricular diastolic function was indeterminate.  •  The right ventricular cavity is mildly dilated. Normal right ventricular systolic function noted.  •  The left atrial cavity is severely dilated.  •  The right atrial cavity is severely dilated.  •  Saline test results are negative.  •  Mild aortic valve regurgitation is present.  •  Mild mitral valve regurgitation is present.  •  Mild to moderate tricuspid valve regurgitation is present.  •  Calculated right ventricular systolic pressure from tricuspid regurgitation is 38 mmHg.  •  There is no evidence of pericardial effusion.          -----------------------------------------------------  RHC/LHC    No results found for this or any previous visit.      ---------------------------------------------------------------------------    CT:   CTA chest  FINDINGS:  1. There is no convincing evidence for pulmonary thromboemboli. Again  seen is heterogeneous admixture of contrast of the pulmonary arteries at  the right lower lobe, but no pulmonary thromboemboli are seen. Pulmonary  arteries are enlarged with the main pulmonary artery measuring 3.8 cm in  diameter, pulmonary arterial hypertension.     The thoracic aorta is ectatic at 4.0 cm, stable when measured along the  same planes. The diameter tapers to 3 cm at the aortic arch.     2. Stable marked elevation of the right hemidiaphragm with significant  compressive atelectasis at the right lower lobe. No new airspace  consolidations are seen  suspicious for pneumonia. There are no pleural  or pericardial effusions. There is cardiomegaly and findings of  right-sided cardiac insufficiency, unchanged.     3. Enlarged mediastinal nodes are stable and extensive multinodular  goiter appears stable, although not seen in its entirety. No new  abnormality seen at the visualized upper abdomen, multiple renal cysts  are noted.      --------------------------------------------------------------------------------------------------------------    Laboratory evaluations:  Renal Function: Estimated Creatinine Clearance: 34.2 mL/min (A) (by C-G formula based on SCr of 1.08 mg/dL (H)).    Lab Results   Component Value Date    GLUCOSE 83 01/20/2025    BUN 22 01/20/2025    CREATININE 1.08 (H) 01/20/2025    EGFRIFNONA 77 07/29/2021    EGFRIFAFRI 115 01/24/2022    BCR 20.4 01/20/2025    K 4.9 01/20/2025    CO2 28.3 01/20/2025    CALCIUM 10.1 01/20/2025    PROTENTOTREF 6.6 10/18/2024    ALBUMIN 3.4 (L) 01/18/2025    LABIL2 1.1 10/18/2024    AST 12 01/18/2025    ALT 6 01/18/2025     Lab Results   Component Value Date    WBC 2.96 (L) 01/20/2025    HGB 13.3 01/20/2025    HCT 38.5 01/20/2025    .1 (H) 01/20/2025     (L) 01/20/2025     Lab Results   Component Value Date    HGBA1C 5.4 04/25/2022     Lab Results   Component Value Date    HGBA1C 5.4 04/25/2022    HGBA1C 4.90 06/23/2021    HGBA1C 5.30 05/11/2021     Lab Results   Component Value Date    CREATININE 1.08 (H) 01/20/2025     Lab Results   Component Value Date    IRON 71 01/03/2020    TIBC 372 01/03/2020    FERRITIN 65.00 09/29/2022       Result Review:  I have personally reviewed the results from the time of this admission to 1/29/2025 11:46 EST and agree with these findings:  [x]  Laboratory list / accordion  []  Microbiology  [x]  Radiology  [x]  EKG/Telemetry   [x]  Cardiology/Vascular   []  Pathology  [x]  Old records  []  Other:  Most notable findings include:   Pacemaker  interrogation-1/19/2025-Presenting: AF/ @ 60 bpm  Battery: 5 MONTHS!- WATCH FOR RRT!  Sensing, impedance, and thresholds reviewed  Events: No new events; AF known to pt; AC d/c due to hx of GI  bleed and intracranial hemorrhage.  Heart Failure: NA  Heart rate histograms reviewed  Pacing and detection parameters reviewed  Planned follow-up: Remote monitoring only per Chart Notes  Remote monitoring: Pt is connected in [employer].        ReDS VOLUMETRIC ASSESSMENT:  UTO      Assessment & Plan     1. Pulmonary hypertension    2. Chronic heart failure with preserved ejection fraction    3. Dysfunction of right cardiac ventricle        Pulmonary hypertension-she has HFpEF and HUGO. we will focus on GDMT for heart failure.  No current indications today for the initiation of PAH-specific medications.  However, if she has any further interval negative remodeling of her RV. May consider VQ scan in the future to exclude CTEPH.  PFTs showed Mild restrictive lung disease with a moderate diffusion deficit.     HFpEF.  Today, euvolemic.  She did have recent heart failure exacerbation and was admitted.  Diuretics were continued at discharge at normal dose.  Patient's family feels that this exacerbation was related to patient not getting her medications as her daughter/primary caregiver was sick and not managing her medicines at that time.    Labs reviewed from 1/20/2025 and renal function stable.  Tentative plan to repeat echo in April  Patient has been feeling generally fatigued.  We will check iron profile/ferritin with lab draw at next visit  GDMT listed below    3. Right ventricular dysfunction--would like to keep patient on spironolactone 12.5mg daily.       NYHA stage C FC-III     Clinical status was assessed and has remained stable.  Treatment intent: curative   ReDS reading was not obtained today.  ReDS result: UTO       Today, Patient appears euvolemic. and with perfusion. The patient's hemodynamics are currently  acceptable. HR is: normal and is at goal. BP/MAP was reviewed and there is not room for medication up-titration.  The patient's clinical course has been impacted by Arrythmia-persistent afib, untreated HUGO. LVEF: 61-65%.     GDMT Assessment:     GDMT changes recommended today: No changes to medication therapy.      BB:   Rate controlled without BB--possible future addition as patient has pacemaker if needed  Monitor heart rate.  Please call the [facility] for HR<50, dizziness, lightheadedness, syncope     A/A/A:   Hemodynamics have not previously allow--recent issues with hyperkalemia.  Patient was taking extra potassium supplement     [id number]:  Not a candidate due to having severe vaginal yeast infections.       MRA:  The patient is FC-NYHA Class III and MRA is indicated.   continue Spironolactone to 12.5mg daily  BMP at next visit--- order placed          Diuretic Regimen:  -DIURETIC:   furosemide (LASIX) 20 mg every day  -Fluid restriction:   -requested  -2000 ml  -Patient has been asked to weigh daily and was provided with a printed diuretic strategy.  -Sodium restriction:   -1,500 mg Na restriction was discussed.      Labs/Diagnostics/Referrals:    Labs -Chem-7       Lifestyle Advice:   - call office if I gain more than 2 pounds in one day or 5 pounds in one week   - keep legs up while sitting   - use salt in moderation   - watch for swelling in feet, ankles and legs every day   - weigh myself daily   -call for concerning s/sx   -- activity or exercise based on tolerance encouraged     CODE STATUS: FULL              No follow-ups on file.  F/u as scheduled            Thank you for allowing me to participate in the care of your patient,         Brooke Rubio SHAHID Clay  [facility]  01/29/25  09:12 EDT      **Deon Disclaimer:**  Much of this encounter note is an electronic transcription/translation of spoken language to printed text. The electronic translation of spoken language may permit erroneous,  or at times, nonsensical words or phrases to be inadvertently transcribed. Although I have reviewed the note for such errors, some may still exist.    The information in this note was reviewed and updated during the visit to be as accurate as possible. As many patients have chronic medical problems, many of their individual problems and ongoing plans do not change significantly from visit to visit.  This information is correct and is consistent with my treatment plan as of today's visit.

## 2025-01-29 NOTE — PROGRESS NOTES
[facility]    Franklin Galvin MD  [address]  [address]  [place] [address]    Thank you for asking me to see Brittany Villeda.     Congestive Heart Failure  Pertinent negatives include no chest pain or shortness of breath.       1.  Pulmonary hypertension  2.  HFpEF    Subjective      Brittany Damian a 89 y.o. female who presents today for pulmonary hypertension, HFpEF.   The patient's most recent 2D TTE showed evidence of pulmonary hypertension and right-sided involvement.  She does have biatrial dilatation.  She was diagnosed with HUGO. She does not use a CPAP. She was diagnosed with complete heart block in the past and required placement of a PPM--Status post [employer] dual-chamber pacemaker in 2018 .  She also was diagnosed with COPD.  She does not routinely follow with pulmonary medicine.  She also was diagnosed with MGUS.      Since last visit patient had admission in December 20, 2024 with COVID-19.  She was treated with Paxlovid and was able to be discharged.    Patient had hospitalization 1/18/2025 to 1/20/2025.  Acute decompensated Heart falure Hospitalization.  She received IV Lasix for diuresis with improvement.  She was discharged on home medications-Lasix 20 mg daily, spironolactone 12.5 mg daily. Feels like this was due to not having medicaitons.     Stable exercise intolerance.  Denies any abdominal distention.    She does report exertional dyspnea/shortness of breath.   Denies any PND.         Review of Systems - Review of Systems   Constitutional: Negative for weight gain and weight loss.   Cardiovascular:  Negative for chest pain, dyspnea on exertion, leg swelling, orthopnea, paroxysmal nocturnal dyspnea and syncope.   Respiratory:  Negative for cough, shortness of breath, sleep disturbances due to breathing and snoring.    Gastrointestinal:  Negative for bloating.   Neurological:  Negative for dizziness, light-headedness and  weakness.          Patient Active Problem List   Diagnosis    Non-toxic multinodular goiter    Chronic midline low back pain with right-sided sciatica    Essential hypertension    Gastroesophageal reflux disease without esophagitis    Cataract    Pulmonary hypertension    Diastolic dysfunction    HUGO (obstructive sleep apnea)    Trifascicular block    MGUS (monoclonal gammopathy of unknown significance)    Ulcerative rectosigmoiditis without complication    Lichen sclerosus    Heart block    Sleep-related hypoxia    Bradycardia    History of atrial fibrillation    Pacemaker    Paroxysmal SVT (supraventricular tachycardia)    History of chest pain    Palpitations    Paroxysmal atrial fibrillation    Ascending aortic aneurysm    Mediastinal lymphadenopathy    Anemia    Obesity (BMI 30-39.9)    Generalized weakness    Dysautonomia    Hypercalcemia    Hyperparathyroidism    Choledocholithiasis    Duodenal ulcer    Hemorrhagic gastritis    Exertional dyspnea    COPD (chronic obstructive pulmonary disease)    Osteoarthritis of glenohumeral joint    ICH (intracerebral hemorrhage)    Lower GI bleed    Thrombocytopenia    Lichen sclerosus of female genitalia    Senile atrophic vaginitis    Left inguinal hernia    Vulvar pain    Bilateral lower extremity edema    Chronic heart failure with preserved ejection fraction    Acute renal insufficiency    Dysfunction of right cardiac ventricle    COVID-19 virus infection    Shortness of breath     family history includes Breast cancer in her sister; Diabetes in her mother; Heart disease in her sister; Kidney cancer in her sister; Prostate cancer in her brother, brother, and brother.   reports that she quit smoking about 60 years ago. Her smoking use included cigarettes. She started smoking about 72 years ago. She has a 18 pack-year smoking history. She has been exposed to tobacco smoke. She has never used smokeless tobacco. She reports that she does not drink alcohol and does not  use drugs.  Allergies   Allergen Reactions    Codeine Hallucinations     Tolerates hydromorphone    Amitriptyline Rash    Amoxicillin-Pot Clavulanate Rash    Aspirin Unknown - Low Severity     Patient doesn't know why    Carisoprodol-Aspirin-Codeine Palpitations    Iodinated Contrast Media Rash    Latex Rash    Naproxen Rash    Nsaids Unknown - Low Severity     UNSURE OF REACTION    Soma Compound With Codeine [Carisoprodol-Aspirin-Codeine] Rash    Sulfa Antibiotics Rash    Tramadol Palpitations     heart races      Physical Activity: Sufficiently Active (4/26/2024)    Exercise Vital Sign     Days of Exercise per Week: 5 days     Minutes of Exercise per Session: 30 min          Current Outpatient Medications:     acetaminophen (TYLENOL) 500 MG tablet, Take 1 tablet by mouth 2 (Two) Times a Day., Disp: , Rfl:     docusate sodium (COLACE) 100 MG capsule, Take 1 capsule by mouth Daily., Disp: , Rfl:     ELDERBERRY PO, Take 2 tablets by mouth Daily., Disp: , Rfl:     furosemide (LASIX) 40 MG tablet, Take 0.5 tablets by mouth Daily. Taking 20mg, Disp: 90 tablet, Rfl: 1    lidocaine (XYLOCAINE) 5 % ointment, APPLY 1 A THIN LAYER TOPICALLY DAILY AS DIRECTED, Disp: 35.44 g, Rfl: 5    Magnesium Oxide -Mg Supplement 400 (240 Mg) MG tablet, TAKE 1 TABLET BY MOUTH DAILY, Disp: 90 tablet, Rfl: 1    mesalamine (APRISO) 0.375 g 24 hr capsule, TAKE FOUR CAPSULES BY MOUTH DAILY, Disp: 360 capsule, Rfl: 2    mesalamine (CANASA) 1000 MG suppository, UNWRAP AND INSERT 1 SUPPOSITORY RECTALLY EVERY NIGHT AT BEDTIME (Patient taking differently: Insert 1 suppository into the rectum At Night As Needed (pain/bleeding).), Disp: 30 suppository, Rfl: 0    pantoprazole (PROTONIX) 40 MG EC tablet, TAKE 1 TABLET BY MOUTH DAILY, Disp: 90 tablet, Rfl: 1    spironolactone (ALDACTONE) 25 MG tablet, Take 0.5 tablets by mouth Daily., Disp: , Rfl:     tacrolimus (PROTOPIC) 0.1 % ointment, , Disp: , Rfl:     valACYclovir (VALTREX) 1000 MG tablet, Take 1  tablet by mouth Daily., Disp: , Rfl:     vitamin B-12 (CYANOCOBALAMIN) 1000 MCG tablet, TAKE 1 TABLET BY MOUTH DAILY, Disp: 90 tablet, Rfl: 1    Objective   Vital Sign Review:   Vitals:    01/29/25 1106   BP: 122/68   Pulse: 60   SpO2: 97%     Body mass index is 27 kg/m².      01/29/25  1106   Weight: 71.4 kg (157 lb 6.4 oz)       Physical Exam:  Constitutional:       Appearance: Normal and healthy appearance.   Neck:      Vascular: No carotid bruit or JVD. JVD normal.   Pulmonary:      Effort: Pulmonary effort is normal.      Breath sounds: Normal breath sounds.   Cardiovascular:      PMI at left midclavicular line. Normal rate. Regular rhythm.   Pulses:     Intact distal pulses.   Edema:     Peripheral edema absent.   Abdominal:      Palpations: Abdomen is soft.      Tenderness: There is no abdominal tenderness.   Skin:     General: Skin is warm and dry.   Neurological:      General: No focal deficit present.      Mental Status: Alert and oriented to person, place and time.   Psychiatric:         Behavior: Behavior is cooperative.          DATA REVIEWED:   EKG:  ECG 12 Lead     Date/Time: 8/12/2024 1:25 PM  Performed by: Sydney Gilliam APRN     Authorized by: Sydney Gilliam APRN  Comparison: compared with previous ECG   Similar to previous ECG  Rhythm: atrial fibrillation and paced  Rate: normal  BPM: 67  Conduction: right bundle branch block  QRS axis: normal  Comments: Similar to prior    ---------------------------------------------------  ECHO:    Results for orders placed during the hospital encounter of 04/23/24    Adult Transthoracic Echo Complete W/ Cont if Necessary Per Protocol    Interpretation Summary    Left ventricular systolic function is normal. Left ventricular ejection fraction appears to be 61 - 65%.    Left ventricular diastolic function was indeterminate.    The right ventricular cavity is mildly dilated. Normal right ventricular systolic function noted.    The left atrial cavity is  severely dilated.    The right atrial cavity is severely dilated.    Saline test results are negative.    Mild aortic valve regurgitation is present.    Mild mitral valve regurgitation is present.    Mild to moderate tricuspid valve regurgitation is present.    Calculated right ventricular systolic pressure from tricuspid regurgitation is 38 mmHg.    There is no evidence of pericardial effusion.          -----------------------------------------------------  RHC/LHC    No results found for this or any previous visit.      ---------------------------------------------------------------------------    CT:   CTA chest  FINDINGS:  1. There is no convincing evidence for pulmonary thromboemboli. Again  seen is heterogeneous admixture of contrast of the pulmonary arteries at  the right lower lobe, but no pulmonary thromboemboli are seen. Pulmonary  arteries are enlarged with the main pulmonary artery measuring 3.8 cm in  diameter, pulmonary arterial hypertension.     The thoracic aorta is ectatic at 4.0 cm, stable when measured along the  same planes. The diameter tapers to 3 cm at the aortic arch.     2. Stable marked elevation of the right hemidiaphragm with significant  compressive atelectasis at the right lower lobe. No new airspace  consolidations are seen suspicious for pneumonia. There are no pleural  or pericardial effusions. There is cardiomegaly and findings of  right-sided cardiac insufficiency, unchanged.     3. Enlarged mediastinal nodes are stable and extensive multinodular  goiter appears stable, although not seen in its entirety. No new  abnormality seen at the visualized upper abdomen, multiple renal cysts  are noted.      --------------------------------------------------------------------------------------------------------------    Laboratory evaluations:  Renal Function: Estimated Creatinine Clearance: 34.2 mL/min (A) (by C-G formula based on SCr of 1.08 mg/dL (H)).    Lab Results   Component Value  Date    GLUCOSE 83 01/20/2025    BUN 22 01/20/2025    CREATININE 1.08 (H) 01/20/2025    EGFRIFNONA 77 07/29/2021    EGFRIFAFRI 115 01/24/2022    BCR 20.4 01/20/2025    K 4.9 01/20/2025    CO2 28.3 01/20/2025    CALCIUM 10.1 01/20/2025    PROTENTOTREF 6.6 10/18/2024    ALBUMIN 3.4 (L) 01/18/2025    LABIL2 1.1 10/18/2024    AST 12 01/18/2025    ALT 6 01/18/2025     Lab Results   Component Value Date    WBC 2.96 (L) 01/20/2025    HGB 13.3 01/20/2025    HCT 38.5 01/20/2025    .1 (H) 01/20/2025     (L) 01/20/2025     Lab Results   Component Value Date    HGBA1C 5.4 04/25/2022     Lab Results   Component Value Date    HGBA1C 5.4 04/25/2022    HGBA1C 4.90 06/23/2021    HGBA1C 5.30 05/11/2021     Lab Results   Component Value Date    CREATININE 1.08 (H) 01/20/2025     Lab Results   Component Value Date    IRON 71 01/03/2020    TIBC 372 01/03/2020    FERRITIN 65.00 09/29/2022       Result Review:  I have personally reviewed the results from the time of this admission to 1/29/2025 11:46 EST and agree with these findings:  [x]  Laboratory list / accordion  []  Microbiology  [x]  Radiology  [x]  EKG/Telemetry   [x]  Cardiology/Vascular   []  Pathology  [x]  Old records  []  Other:  Most notable findings include:   Pacemaker interrogation-1/19/2025-Presenting: AF/ @ 60 bpm  Battery: 5 MONTHS!- WATCH FOR RRT!  Sensing, impedance, and thresholds reviewed  Events: No new events; AF known to pt; AC d/c due to hx of GI  bleed and intracranial hemorrhage.  Heart Failure: NA  Heart rate histograms reviewed  Pacing and detection parameters reviewed  Planned follow-up: Remote monitoring only per Chart Notes  Remote monitoring: Pt is connected in [employer].        ReDS VOLUMETRIC ASSESSMENT:  UTO      Assessment & Plan      1. Pulmonary hypertension    2. Chronic heart failure with preserved ejection fraction    3. Dysfunction of right cardiac ventricle        Pulmonary hypertension-she has HFpEF and HUGO. we will focus on  GDMT for heart failure.  No current indications today for the initiation of PAH-specific medications.  However, if she has any further interval negative remodeling of her RV. May consider VQ scan in the future to exclude CTEPH.  PFTs showed Mild restrictive lung disease with a moderate diffusion deficit.     HFpEF.  Today, euvolemic.  She did have recent heart failure exacerbation and was admitted.  Diuretics were continued at discharge at normal dose.  Patient's family feels that this exacerbation was related to patient not getting her medications as her daughter/primary caregiver was sick and not managing her medicines at that time.    Labs reviewed from 1/20/2025 and renal function stable.  Tentative plan to repeat echo in April  Patient has been feeling generally fatigued.  We will check iron profile/ferritin with lab draw at next visit  GDMT listed below    3. Right ventricular dysfunction--would like to keep patient on spironolactone 12.5mg daily.       NYHA stage C FC-III     Clinical status was assessed and has remained stable.  Treatment intent: curative   ReDS reading was not obtained today.  ReDS result: UTO       Today, Patient appears euvolemic. and with perfusion. The patient's hemodynamics are currently acceptable. HR is: normal and is at goal. BP/MAP was reviewed and there is not room for medication up-titration.  The patient's clinical course has been impacted by Arrythmia-persistent afib, untreated HUGO. LVEF: 61-65%.     GDMT Assessment:     GDMT changes recommended today: No changes to medication therapy.      BB:   Rate controlled without BB--possible future addition as patient has pacemaker if needed  Monitor heart rate.  Please call the [facility] for HR<50, dizziness, lightheadedness, syncope     A/A/A:   Hemodynamics have not previously allow--recent issues with hyperkalemia.  Patient was taking extra potassium supplement     [id number]:  Not a candidate due to having severe vaginal yeast infections.        MRA:  The patient is FC-NYHA Class III and MRA is indicated.   continue Spironolactone to 12.5mg daily  BMP at next visit--- order placed          Diuretic Regimen:  -DIURETIC:   furosemide (LASIX) 20 mg every day  -Fluid restriction:   -requested  -2000 ml  -Patient has been asked to weigh daily and was provided with a printed diuretic strategy.  -Sodium restriction:   -1,500 mg Na restriction was discussed.      Labs/Diagnostics/Referrals:    Labs -Chem-7       Lifestyle Advice:   - call office if I gain more than 2 pounds in one day or 5 pounds in one week   - keep legs up while sitting   - use salt in moderation   - watch for swelling in feet, ankles and legs every day   - weigh myself daily   -call for concerning s/sx   -- activity or exercise based on tolerance encouraged     CODE STATUS: FULL              No follow-ups on file.  F/u as scheduled            Thank you for allowing me to participate in the care of your patient,         Brooke ClaySHAHID  [facility]  01/29/25  09:12 EDT      **Deon Disclaimer:**  Much of this encounter note is an electronic transcription/translation of spoken language to printed text. The electronic translation of spoken language may permit erroneous, or at times, nonsensical words or phrases to be inadvertently transcribed. Although I have reviewed the note for such errors, some may still exist.    The information in this note was reviewed and updated during the visit to be as accurate as possible. As many patients have chronic medical problems, many of their individual problems and ongoing plans do not change significantly from visit to visit.  This information is correct and is consistent with my treatment plan as of today's visit.

## 2025-01-29 NOTE — ADDENDUM NOTE
Encounter addended by: Jessenia Knight MA on: 1/29/2025 1:58 PM   Actions taken: Charge Capture section accepted

## 2025-02-10 ENCOUNTER — APPOINTMENT (OUTPATIENT)
Dept: CT IMAGING | Facility: HOSPITAL | Age: OVER 89
End: 2025-02-10
Payer: MEDICARE

## 2025-02-10 ENCOUNTER — HOSPITAL ENCOUNTER (OUTPATIENT)
Facility: HOSPITAL | Age: OVER 89
Setting detail: OBSERVATION
Discharge: HOME | End: 2025-02-12
Attending: EMERGENCY MEDICINE | Admitting: STUDENT IN AN ORGANIZED HEALTH CARE EDUCATION/TRAINING PROGRAM
Payer: MEDICARE

## 2025-02-10 DIAGNOSIS — K92.2 ACUTE LOWER GI BLEEDING: ICD-10-CM

## 2025-02-10 DIAGNOSIS — N18.9 CHRONIC RENAL IMPAIRMENT, UNSPECIFIED CKD STAGE: ICD-10-CM

## 2025-02-10 DIAGNOSIS — D69.6 THROMBOCYTOPENIA: ICD-10-CM

## 2025-02-10 DIAGNOSIS — R10.9 ACUTE ABDOMINAL PAIN: Primary | ICD-10-CM

## 2025-02-10 DIAGNOSIS — K51.919 ULCERATIVE COLITIS WITH COMPLICATION, UNSPECIFIED LOCATION: ICD-10-CM

## 2025-02-10 LAB
ABO GROUP BLD: NORMAL
ALBUMIN SERPL-MCNC: 3.7 G/DL (ref 3.5–5.2)
ALBUMIN/GLOB SERPL: 1.2 G/DL
ALP SERPL-CCNC: 60 U/L (ref 39–117)
ALT SERPL W P-5'-P-CCNC: <5 U/L (ref 1–33)
ANION GAP SERPL CALCULATED.3IONS-SCNC: 6 MMOL/L (ref 5–15)
AST SERPL-CCNC: 13 U/L (ref 1–32)
BACTERIA UR QL AUTO: ABNORMAL /HPF
BASOPHILS # BLD AUTO: 0.02 10*3/MM3 (ref 0–0.2)
BASOPHILS NFR BLD AUTO: 0.5 % (ref 0–1.5)
BILIRUB SERPL-MCNC: 0.6 MG/DL (ref 0–1.2)
BILIRUB UR QL STRIP: NEGATIVE
BLD GP AB SCN SERPL QL: NEGATIVE
BUN SERPL-MCNC: 28 MG/DL (ref 8–23)
BUN/CREAT SERPL: 23.1 (ref 7–25)
CALCIUM SPEC-SCNC: 10.2 MG/DL (ref 8.6–10.5)
CHLORIDE SERPL-SCNC: 104 MMOL/L (ref 98–107)
CLARITY UR: ABNORMAL
CO2 SERPL-SCNC: 25 MMOL/L (ref 22–29)
COLOR UR: YELLOW
CREAT SERPL-MCNC: 1.21 MG/DL (ref 0.57–1)
DEPRECATED RDW RBC AUTO: 47 FL (ref 37–54)
EGFRCR SERPLBLD CKD-EPI 2021: 42.9 ML/MIN/1.73
EOSINOPHIL # BLD AUTO: 0.09 10*3/MM3 (ref 0–0.4)
EOSINOPHIL NFR BLD AUTO: 2.3 % (ref 0.3–6.2)
ERYTHROCYTE [DISTWIDTH] IN BLOOD BY AUTOMATED COUNT: 11.8 % (ref 12.3–15.4)
FERRITIN SERPL-MCNC: 87.9 NG/ML (ref 13–150)
GLOBULIN UR ELPH-MCNC: 3.1 GM/DL
GLUCOSE SERPL-MCNC: 90 MG/DL (ref 65–99)
GLUCOSE UR STRIP-MCNC: NEGATIVE MG/DL
HCT VFR BLD AUTO: 35.7 % (ref 34–46.6)
HCT VFR BLD AUTO: 36.1 % (ref 34–46.6)
HGB BLD-MCNC: 11.4 G/DL (ref 12–15.9)
HGB BLD-MCNC: 11.6 G/DL (ref 12–15.9)
HGB UR QL STRIP.AUTO: NEGATIVE
HYALINE CASTS UR QL AUTO: ABNORMAL /LPF
IMM GRANULOCYTES # BLD AUTO: 0 10*3/MM3 (ref 0–0.05)
IMM GRANULOCYTES NFR BLD AUTO: 0 % (ref 0–0.5)
INR PPP: 1.24 (ref 0.9–1.1)
IRON 24H UR-MRATE: 49 MCG/DL (ref 37–145)
IRON SATN MFR SERPL: 15 % (ref 20–50)
KETONES UR QL STRIP: NEGATIVE
LEUKOCYTE ESTERASE UR QL STRIP.AUTO: ABNORMAL
LYMPHOCYTES # BLD AUTO: 1.96 10*3/MM3 (ref 0.7–3.1)
LYMPHOCYTES NFR BLD AUTO: 49.4 % (ref 19.6–45.3)
MCH RBC QN AUTO: 34.4 PG (ref 26.6–33)
MCHC RBC AUTO-ENTMCNC: 31.6 G/DL (ref 31.5–35.7)
MCV RBC AUTO: 109.1 FL (ref 79–97)
MONOCYTES # BLD AUTO: 0.38 10*3/MM3 (ref 0.1–0.9)
MONOCYTES NFR BLD AUTO: 9.6 % (ref 5–12)
NEUTROPHILS NFR BLD AUTO: 1.52 10*3/MM3 (ref 1.7–7)
NEUTROPHILS NFR BLD AUTO: 38.2 % (ref 42.7–76)
NITRITE UR QL STRIP: NEGATIVE
PH UR STRIP.AUTO: 6 [PH] (ref 5–8)
PLATELET # BLD AUTO: 101 10*3/MM3 (ref 140–450)
PMV BLD AUTO: 9.7 FL (ref 6–12)
POTASSIUM SERPL-SCNC: 4.9 MMOL/L (ref 3.5–5.2)
PROT SERPL-MCNC: 6.8 G/DL (ref 6–8.5)
PROT UR QL STRIP: NEGATIVE
PROTHROMBIN TIME: 15.5 SECONDS (ref 11.7–14.2)
RBC # BLD AUTO: 3.31 10*6/MM3 (ref 3.77–5.28)
RBC # UR STRIP: ABNORMAL /HPF
REF LAB TEST METHOD: ABNORMAL
RH BLD: POSITIVE
SODIUM SERPL-SCNC: 135 MMOL/L (ref 136–145)
SP GR UR STRIP: 1.01 (ref 1–1.03)
SQUAMOUS #/AREA URNS HPF: ABNORMAL /HPF
T&S EXPIRATION DATE: NORMAL
TIBC SERPL-MCNC: 328 MCG/DL (ref 298–536)
TRANSFERRIN SERPL-MCNC: 220 MG/DL (ref 200–360)
UROBILINOGEN UR QL STRIP: ABNORMAL
WBC # UR STRIP: ABNORMAL /HPF
WBC NRBC COR # BLD AUTO: 3.97 10*3/MM3 (ref 3.4–10.8)

## 2025-02-10 PROCEDURE — G0378 HOSPITAL OBSERVATION PER HR: HCPCS

## 2025-02-10 PROCEDURE — 86850 RBC ANTIBODY SCREEN: CPT | Performed by: INTERNAL MEDICINE

## 2025-02-10 PROCEDURE — 74176 CT ABD & PELVIS W/O CONTRAST: CPT

## 2025-02-10 PROCEDURE — 99285 EMERGENCY DEPT VISIT HI MDM: CPT

## 2025-02-10 PROCEDURE — 85025 COMPLETE CBC W/AUTO DIFF WBC: CPT | Performed by: EMERGENCY MEDICINE

## 2025-02-10 PROCEDURE — 85610 PROTHROMBIN TIME: CPT | Performed by: EMERGENCY MEDICINE

## 2025-02-10 PROCEDURE — 85014 HEMATOCRIT: CPT | Performed by: INTERNAL MEDICINE

## 2025-02-10 PROCEDURE — 85018 HEMOGLOBIN: CPT | Performed by: INTERNAL MEDICINE

## 2025-02-10 PROCEDURE — 36415 COLL VENOUS BLD VENIPUNCTURE: CPT

## 2025-02-10 PROCEDURE — 86901 BLOOD TYPING SEROLOGIC RH(D): CPT | Performed by: INTERNAL MEDICINE

## 2025-02-10 PROCEDURE — 80053 COMPREHEN METABOLIC PANEL: CPT | Performed by: EMERGENCY MEDICINE

## 2025-02-10 PROCEDURE — 81001 URINALYSIS AUTO W/SCOPE: CPT | Performed by: EMERGENCY MEDICINE

## 2025-02-10 PROCEDURE — 25810000003 SODIUM CHLORIDE 0.9 % SOLUTION: Performed by: INTERNAL MEDICINE

## 2025-02-10 PROCEDURE — 83540 ASSAY OF IRON: CPT | Performed by: INTERNAL MEDICINE

## 2025-02-10 PROCEDURE — 86900 BLOOD TYPING SEROLOGIC ABO: CPT | Performed by: INTERNAL MEDICINE

## 2025-02-10 PROCEDURE — 82728 ASSAY OF FERRITIN: CPT | Performed by: INTERNAL MEDICINE

## 2025-02-10 PROCEDURE — 84466 ASSAY OF TRANSFERRIN: CPT | Performed by: INTERNAL MEDICINE

## 2025-02-10 RX ORDER — ONDANSETRON 4 MG/1
4 TABLET, ORALLY DISINTEGRATING ORAL EVERY 6 HOURS PRN
Status: DISCONTINUED | OUTPATIENT
Start: 2025-02-10 | End: 2025-02-12 | Stop reason: HOSPADM

## 2025-02-10 RX ORDER — FUROSEMIDE 20 MG/1
20 TABLET ORAL DAILY
Status: DISCONTINUED | OUTPATIENT
Start: 2025-02-11 | End: 2025-02-12 | Stop reason: HOSPADM

## 2025-02-10 RX ORDER — ACETAMINOPHEN 500 MG
500 TABLET ORAL ONCE
Status: COMPLETED | OUTPATIENT
Start: 2025-02-10 | End: 2025-02-10

## 2025-02-10 RX ORDER — PANTOPRAZOLE SODIUM 40 MG/1
40 TABLET, DELAYED RELEASE ORAL DAILY
Status: DISCONTINUED | OUTPATIENT
Start: 2025-02-11 | End: 2025-02-12 | Stop reason: HOSPADM

## 2025-02-10 RX ORDER — BISACODYL 10 MG
10 SUPPOSITORY, RECTAL RECTAL DAILY PRN
Status: DISCONTINUED | OUTPATIENT
Start: 2025-02-10 | End: 2025-02-12 | Stop reason: HOSPADM

## 2025-02-10 RX ORDER — MESALAMINE 0.38 G/1
1.5 CAPSULE, EXTENDED RELEASE ORAL DAILY
Status: DISCONTINUED | OUTPATIENT
Start: 2025-02-11 | End: 2025-02-12 | Stop reason: HOSPADM

## 2025-02-10 RX ORDER — LIDOCAINE 50 MG/G
OINTMENT TOPICAL DAILY
Status: DISCONTINUED | OUTPATIENT
Start: 2025-02-10 | End: 2025-02-12 | Stop reason: HOSPADM

## 2025-02-10 RX ORDER — MULTIVITAMIN WITH IRON
1000 TABLET ORAL DAILY
Status: DISCONTINUED | OUTPATIENT
Start: 2025-02-11 | End: 2025-02-12 | Stop reason: HOSPADM

## 2025-02-10 RX ORDER — AMOXICILLIN 250 MG
2 CAPSULE ORAL 2 TIMES DAILY PRN
Status: DISCONTINUED | OUTPATIENT
Start: 2025-02-10 | End: 2025-02-12 | Stop reason: HOSPADM

## 2025-02-10 RX ORDER — BISACODYL 5 MG/1
5 TABLET, DELAYED RELEASE ORAL DAILY PRN
Status: DISCONTINUED | OUTPATIENT
Start: 2025-02-10 | End: 2025-02-12 | Stop reason: HOSPADM

## 2025-02-10 RX ORDER — MESALAMINE 1000 MG/1
1000 SUPPOSITORY RECTAL NIGHTLY PRN
Status: DISCONTINUED | OUTPATIENT
Start: 2025-02-10 | End: 2025-02-12 | Stop reason: HOSPADM

## 2025-02-10 RX ORDER — SODIUM CHLORIDE 9 MG/ML
75 INJECTION, SOLUTION INTRAVENOUS CONTINUOUS
Status: ACTIVE | OUTPATIENT
Start: 2025-02-10 | End: 2025-02-11

## 2025-02-10 RX ORDER — ONDANSETRON 2 MG/ML
4 INJECTION INTRAMUSCULAR; INTRAVENOUS EVERY 6 HOURS PRN
Status: DISCONTINUED | OUTPATIENT
Start: 2025-02-10 | End: 2025-02-12 | Stop reason: HOSPADM

## 2025-02-10 RX ORDER — VALACYCLOVIR HYDROCHLORIDE 500 MG/1
1000 TABLET, FILM COATED ORAL DAILY
Status: DISCONTINUED | OUTPATIENT
Start: 2025-02-11 | End: 2025-02-12 | Stop reason: HOSPADM

## 2025-02-10 RX ORDER — DOCUSATE SODIUM 100 MG/1
100 CAPSULE, LIQUID FILLED ORAL DAILY
Status: DISCONTINUED | OUTPATIENT
Start: 2025-02-11 | End: 2025-02-12 | Stop reason: HOSPADM

## 2025-02-10 RX ORDER — ACETAMINOPHEN 500 MG
1000 TABLET ORAL ONCE
Status: DISCONTINUED | OUTPATIENT
Start: 2025-02-10 | End: 2025-02-10

## 2025-02-10 RX ORDER — POLYETHYLENE GLYCOL 3350 17 G/17G
17 POWDER, FOR SOLUTION ORAL DAILY PRN
Status: DISCONTINUED | OUTPATIENT
Start: 2025-02-10 | End: 2025-02-12 | Stop reason: HOSPADM

## 2025-02-10 RX ADMIN — SODIUM CHLORIDE 75 ML/HR: 9 INJECTION, SOLUTION INTRAVENOUS at 17:47

## 2025-02-10 RX ADMIN — LIDOCAINE: 50 OINTMENT TOPICAL at 22:02

## 2025-02-10 RX ADMIN — ACETAMINOPHEN 500 MG: 500 TABLET, FILM COATED ORAL at 22:45

## 2025-02-10 NOTE — NURSING NOTE
Nursing report ED to floor  Brittany Villeda  89 y.o.  female    HPI :  HPI  Stated Reason for Visit: RLQ abd pain, rectal bleeding    Chief Complaint  Chief Complaint   Patient presents with    Abdominal Pain    Rectal Bleeding       Admitting doctor:   Rosalind Maher MD    Admitting diagnosis:   The primary encounter diagnosis was Acute abdominal pain. Diagnoses of Acute lower GI bleeding, Ulcerative colitis with complication, unspecified location, Chronic renal impairment, unspecified CKD stage, and Thrombocytopenia were also pertinent to this visit.    Code status:   Current Code Status       Date Active Code Status Order ID Comments User Context       Prior            Allergies:   Codeine, Amitriptyline, Amoxicillin-pot clavulanate, Aspirin, Carisoprodol-aspirin-codeine, Iodinated contrast media, Latex, Naproxen, Nsaids, Soma compound with codeine [carisoprodol-aspirin-codeine], Sulfa antibiotics, and Tramadol    Isolation:   No active isolations    Intake and Output  No intake or output data in the 24 hours ending 02/10/25 1620    Weight:   There were no vitals filed for this visit.    Most recent vitals:   Vitals:    02/10/25 1331 02/10/25 1401 02/10/25 1431 02/10/25 1501   BP: 107/53 100/55 113/51 110/59   Pulse: 60 60 60 60   Resp:       Temp:       SpO2: 100% 100% 100% 100%       Active LDAs/IV Access:   Lines, Drains & Airways       Active LDAs       Name Placement date Placement time Site Days    Peripheral IV 02/10/25 1227 Anterior;Left Forearm 02/10/25  1227  Forearm  less than 1                    Labs (abnormal labs have a star):   Labs Reviewed   COMPREHENSIVE METABOLIC PANEL - Abnormal; Notable for the following components:       Result Value    BUN 28 (*)     Creatinine 1.21 (*)     Sodium 135 (*)     eGFR 42.9 (*)     All other components within normal limits    Narrative:     GFR Categories in Chronic Kidney Disease (CKD)      GFR Category          GFR (mL/min/1.73)     Interpretation  G1                     90 or greater         Normal or high (1)  G2                      60-89                Mild decrease (1)  G3a                   45-59                Mild to moderate decrease  G3b                   30-44                Moderate to severe decrease  G4                    15-29                Severe decrease  G5                    14 or less           Kidney failure          (1)In the absence of evidence of kidney disease, neither GFR category G1 or G2 fulfill the criteria for CKD.    eGFR calculation 2021 CKD-EPI creatinine equation, which does not include race as a factor   PROTIME-INR - Abnormal; Notable for the following components:    Protime 15.5 (*)     INR 1.24 (*)     All other components within normal limits   CBC WITH AUTO DIFFERENTIAL - Abnormal; Notable for the following components:    RBC 3.31 (*)     Hemoglobin 11.4 (*)     .1 (*)     MCH 34.4 (*)     RDW 11.8 (*)     Platelets 101 (*)     Neutrophil % 38.2 (*)     Lymphocyte % 49.4 (*)     Neutrophils, Absolute 1.52 (*)     All other components within normal limits   URINALYSIS W/ MICROSCOPIC IF INDICATED (NO CULTURE) - Abnormal; Notable for the following components:    Appearance, UA Cloudy (*)     Leuk Esterase, UA Moderate (2+) (*)     All other components within normal limits   URINALYSIS, MICROSCOPIC ONLY - Abnormal; Notable for the following components:    WBC, UA 6-10 (*)     Bacteria, UA 3+ (*)     Squamous Epithelial Cells, UA 3-6 (*)     All other components within normal limits   CBC AND DIFFERENTIAL    Narrative:     The following orders were created for panel order CBC & Differential.  Procedure                               Abnormality         Status                     ---------                               -----------         ------                     CBC Auto Differential[[id number]]        Abnormal            Final result                 Please view results for these tests on the  individual orders.       EKG:   No orders to display       Meds given in ED:   Medications - No data to display    Imaging results:  No radiology results for the last day    Ambulatory status:   - standby assist    Social issues:   Social History     Socioeconomic History    Marital status:     Number of children: 10    Years of education: High School   Tobacco Use    Smoking status: Former     Current packs/day: 0.00     Average packs/day: 1.5 packs/day for 12.0 years (18.0 ttl pk-yrs)     Types: Cigarettes     Start date:      Quit date:      Years since quittin.1     Passive exposure: Past    Smokeless tobacco: Never    Tobacco comments:     QUIT SMOKING    Vaping Use    Vaping status: Never Used   Substance and Sexual Activity    Alcohol use: No     Comment: caffeine - decaf coffee    Drug use: Never    Sexual activity: Defer       Peripheral Neurovascular  Peripheral Neurovascular (Adult)  Peripheral Neurovascular WDL: WDL    Neuro Cognitive  Neuro Cognitive (Adult)  Cognitive/Neuro/Behavioral WDL: WDL    Learning  Learning Assessment  Learning Readiness and Ability: no barriers identified    Respiratory  Respiratory WDL  Respiratory WDL: WDL    Abdominal Pain       Pain Assessments  Pain (Adult)  (0-10) Pain Rating: Rest: 5  (0-10) Pain Rating: Activity: 5  Pain Location: abdomen  Pain Side/Orientation: right, lower    NIH Stroke Scale       Roberto Carlos Cevallos RN  02/10/25 16:20 EST

## 2025-02-10 NOTE — ED PROVIDER NOTES
EMERGENCY DEPARTMENT ENCOUNTER  Room Number:  25/25  PCP: Mega Esparza MD  Independent Historians: Patient and Family      HPI:  Chief Complaint: had concerns including Abdominal Pain and Rectal Bleeding.     A complete HPI/ROS/PMH/PSH/SH/FH are unobtainable due to: None    Chronic or social conditions impacting patient care (Social Determinants of Health): None      Context: Brittany Villeda is a 89 y.o. female with a medical history of multiple allergies, pulmonary hypertension, anemia, ulcerative rectosigmoid colitis, COPD, lower GI bleed who presents to the ED c/o acute abdominal pain.  The patient reports that she ate breakfast this morning and then developed right sided abdominal pain.  She reports it is severe.  She has had pain since then.  She also told family that she has been having blood in her stool intermittently for the last 2 weeks.  She had it again today.  She is not on blood thinners.      Review of prior external notes (non-ED) -and- Review of prior external test results outside of this encounter:  Discharge summary dated 1/20/2025 with shortness of air and heart failure exacerbation.  She had some hyperkalemia.    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    MGUS (monoclonal gammopathy of unknown significance) 09/16/2016    Ulcerative rectosigmoiditis without complication 11/30/2017    Lichen sclerosus 08/23/2017    Heart block 10/30/2018    Sleep-related hypoxia 12/22/2018    Bradycardia 12/29/2018    History of atrial  fibrillation 03/19/2019    Pacemaker 03/19/2019    Paroxysmal SVT (supraventricular tachycardia) 05/08/2019    History of chest pain 05/08/2019    Palpitations 05/08/2019    Paroxysmal atrial fibrillation 10/06/2019    Ascending aortic aneurysm 08/24/2020    Mediastinal lymphadenopathy 09/09/2020    Anemia     Obesity (BMI 30-39.9)     Generalized weakness 04/08/2021    Dysautonomia 04/09/2021    Hypercalcemia 04/11/2021    Hyperparathyroidism 04/28/2021    Choledocholithiasis 05/09/2021    Duodenal ulcer 05/13/2021    Hemorrhagic gastritis 05/13/2021    Exertional dyspnea 06/22/2021    COPD (chronic obstructive pulmonary disease)     Osteoarthritis of glenohumeral joint 07/12/2018    ICH (intracerebral hemorrhage) 08/05/2021    Lower GI bleed 11/01/2022    Thrombocytopenia 11/01/2022    Lichen sclerosus of female genitalia 03/13/2023    Senile atrophic vaginitis 03/13/2023    Left inguinal hernia 08/23/2023    Vulvar pain 02/01/2024    Bilateral lower extremity edema 04/24/2024    Chronic heart failure with preserved ejection fraction 04/24/2024    Acute renal insufficiency 04/25/2024    Dysfunction of right cardiac ventricle 05/29/2024    COVID-19 virus infection 12/20/2024    Shortness of breath 01/18/2025     Resolved Ambulatory Problems     Diagnosis Date Noted    Abnormal weight loss 05/12/2016    Tachycardia 05/12/2016    Sciatica 05/12/2016    Nausea and vomiting 05/12/2016    Hyperthyroidism 05/12/2016    Fatigue 05/12/2016    Dizziness 05/12/2016    Diarrhea 05/12/2016    Abnormal thyroid stimulating hormone (TSH) level 05/12/2016    Abdominal pain 05/12/2016    Abnormal EKG 06/30/2016    Leukopenia 09/12/2016    Other chest pain 12/24/2017    Shoulder arthritis 01/28/2019    Rectal bleeding 10/15/2019    Arthritis 12/13/2019    Immobility 11/20/2020    Left knee pain 11/20/2020    Chronic anticoagulation 11/20/2020    Hemarthrosis of left knee 11/20/2020    Acute UTI (urinary tract infection) 04/08/2021     Weakness of both lower extremities 04/09/2021    Spondylosis of lumbosacral region without myelopathy or radiculopathy 04/09/2021    Macrocytosis 04/11/2021    Arthritis of right wrist 04/13/2021    Hypomagnesemia 04/13/2021    Essential hypertension 05/10/2021    Hyperglycemia 05/10/2021    Epigastric pain 05/12/2021    Functional quadriplegia 11/20/2020    Hip pain 04/12/2018    Other malaise and fatigue 05/25/2021    Shoulder pain 04/12/2018    Subdural hematoma 08/05/2021    Melena 09/29/2022    GI bleed 11/01/2022    Hypokalemia 04/26/2024     Past Medical History:   Diagnosis Date    Abdominal aortic aneurysm     Arrhythmia     Asthma     CHF (congestive heart failure) 04/2023    Colitis     GERD (gastroesophageal reflux disease)     Hypertension     Hypertensive heart disease     Inguinal hernia     Kidney stone     Nephrolithiasis     Peptic ulceration     Pressure ulcer     PSVT (paroxysmal supraventricular tachycardia)     Rectal bleed     Sleep apnea     Systemic hypertension     Ventricular tachycardia          PAST SURGICAL HISTORY  Past Surgical History:   Procedure Laterality Date    BACK SURGERY      lumbar fusion    DUSTY HOLE Left 08/08/2021    Procedure: Left-sided dusty holes for evacuation of subdural hematoma;  Surgeon: Gustavo Callaway MD;  Location: [facility] MAIN OR;  Service: Neurosurgery;  Laterality: Left;    CARDIAC ELECTROPHYSIOLOGY PROCEDURE N/A 11/07/2018    Procedure: Pacemaker DC new   [place];  Surgeon: Jesus Granda MD;  Location: [facility] CATH INVASIVE LOCATION;  Service: Cardiology    CHOLECYSTECTOMY      COLONOSCOPY  06/16/2014    colitis, cryptitis,  tics, NBIH, TA w/low grade dysplasia    COLONOSCOPY N/A 10/28/2019    Procedure: COLONOSCOPY WITH COLD AND HOT POLYPECTOMIES;  Surgeon: Micaela English MD;  Location: [facility] ENDOSCOPY;  Service: Gastroenterology    ENDOSCOPY N/A 05/13/2021    Procedure: ESOPHAGOGASTRODUODENOSCOPY with BX;  Surgeon: Stefan Mcfadden MD;   Location:  [facility] ENDOSCOPY;  Service: Gastroenterology;  Laterality: N/A;  EPIGASTRIC PAIN  --DUODENAL ULCER, HEMORRHAGIC GASTRITIS, HIATAL HERNIA     ENDOSCOPY N/A 10/11/2022    Procedure: ESOPHAGOGASTRODUODENOSCOPY;  Surgeon: Micaela English MD;  Location:  [facility] ENDOSCOPY;  Service: Gastroenterology;  Laterality: N/A;  PRE- MELENA  POST- ESOPHAGITIS    HEMORRHOIDECTOMY      HERNIA REPAIR      umbilical    HYSTERECTOMY      INGUINAL HERNIA REPAIR Left 2023    Procedure: INGUINAL HERNIA REPAIR LEFT;  Surgeon: Antonio Foster MD;  Location:  [facility] OR [facility];  Service: General;  Laterality: Left;    JOINT REPLACEMENT Bilateral     KNEES    MYOMECTOMY      PACEMAKER IMPLANTATION      SHOULDER SURGERY      SIGMOIDOSCOPY N/A 2022    Procedure: SIGMOIDOSCOPY FLEXIBLE WITH COLD BIOPSIES AND ASPIRATE;  Surgeon: Micaela English MD;  Location:  [facility] ENDOS[facility];  Service: Gastroenterology;  Laterality: N/A;  PRE- RECTAL BLEEDING  POST- HEMORRHOIDS, DIVERTICULOSIS, COLITIS    SINUS SURGERY      TOE NAIL AMPUTATION  2019    TONSILLECTOMY           FAMILY HISTORY  Family History   Problem Relation Age of Onset    Diabetes Mother     Breast cancer Sister     Kidney cancer Sister     Heart disease Sister     Prostate cancer Brother     Prostate cancer Brother     Prostate cancer Brother     Malig Hyperthermia Neg Hx          SOCIAL HISTORY  Social History     Socioeconomic History    Marital status:     Number of children: 10    Years of education: High School   Tobacco Use    Smoking status: Former     Current packs/day: 0.00     Average packs/day: 1.5 packs/day for 12.0 years (18.0 ttl pk-yrs)     Types: Cigarettes     Start date:      Quit date:      Years since quittin.1     Passive exposure: Past    Smokeless tobacco: Never    Tobacco comments:     QUIT SMOKING    Vaping Use    Vaping status: Never Used   Substance and Sexual Activity    Alcohol use: No     Comment: caffeine -  decaf coffee    Drug use: Never    Sexual activity: Defer         ALLERGIES  Codeine, Amitriptyline, Amoxicillin-pot clavulanate, Aspirin, Carisoprodol-aspirin-codeine, Iodinated contrast media, Latex, Naproxen, Nsaids, Soma compound with codeine [carisoprodol-aspirin-codeine], Sulfa antibiotics, and Tramadol      REVIEW OF SYSTEMS  Review of Systems  Included in HPI  All systems reviewed and negative except for those discussed in HPI.      PHYSICAL EXAM    I have reviewed the triage vital signs and nursing notes.    ED Triage Vitals [02/10/25 1227]   Temp Heart Rate Resp BP SpO2   97.7 °F (36.5 °C) 60 18 105/53 99 %      Temp src Heart Rate Source Patient Position BP Location FiO2 (%)   -- -- -- -- --       Physical Exam  GENERAL: Awake, alert, no acute distress  SKIN: Warm, dry  HENT: Normocephalic, atraumatic  EYES: no scleral icterus  CV: regular rhythm, regular rate  RESPIRATORY: normal effort, lungs clear  ABDOMEN: soft, moderate tenderness in the right upper and right lower quadrants, nondistended  MUSCULOSKELETAL: no deformity  NEURO: alert, moves all extremities, follows commands            LAB RESULTS  Recent Results (from the past 24 hours)   Comprehensive Metabolic Panel    Collection Time: 02/10/25 12:39 PM    Specimen: Blood   Result Value Ref Range    Glucose 90 65 - 99 mg/dL    BUN 28 (H) 8 - 23 mg/dL    Creatinine 1.21 (H) 0.57 - 1.00 mg/dL    Sodium 135 (L) 136 - 145 mmol/L    Potassium 4.9 3.5 - 5.2 mmol/L    Chloride 104 98 - 107 mmol/L    CO2 25.0 22.0 - 29.0 mmol/L    Calcium 10.2 8.6 - 10.5 mg/dL    Total Protein 6.8 6.0 - 8.5 g/dL    Albumin 3.7 3.5 - 5.2 g/dL    ALT (SGPT) <5 1 - 33 U/L    AST (SGOT) 13 1 - 32 U/L    Alkaline Phosphatase 60 39 - 117 U/L    Total Bilirubin 0.6 0.0 - 1.2 mg/dL    Globulin 3.1 gm/dL    A/G Ratio 1.2 g/dL    BUN/Creatinine Ratio 23.1 7.0 - 25.0    Anion Gap 6.0 5.0 - 15.0 mmol/L    eGFR 42.9 (L) >60.0 mL/min/1.73   Protime-INR    Collection Time: 02/10/25 12:39  PM    Specimen: Blood   Result Value Ref Range    Protime 15.5 (H) 11.7 - 14.2 Seconds    INR 1.24 (H) 0.90 - 1.10   CBC Auto Differential    Collection Time: 02/10/25 12:39 PM    Specimen: Blood   Result Value Ref Range    WBC 3.97 3.40 - 10.80 10*3/mm3    RBC 3.31 (L) 3.77 - 5.28 10*6/mm3    Hemoglobin 11.4 (L) 12.0 - 15.9 g/dL    Hematocrit 36.1 34.0 - 46.6 %    .1 (H) 79.0 - 97.0 fL    MCH 34.4 (H) 26.6 - 33.0 pg    MCHC 31.6 31.5 - 35.7 g/dL    RDW 11.8 (L) 12.3 - 15.4 %    RDW-SD 47.0 37.0 - 54.0 fl    MPV 9.7 6.0 - 12.0 fL    Platelets 101 (L) 140 - 450 10*3/mm3    Neutrophil % 38.2 (L) 42.7 - 76.0 %    Lymphocyte % 49.4 (H) 19.6 - 45.3 %    Monocyte % 9.6 5.0 - 12.0 %    Eosinophil % 2.3 0.3 - 6.2 %    Basophil % 0.5 0.0 - 1.5 %    Immature Grans % 0.0 0.0 - 0.5 %    Neutrophils, Absolute 1.52 (L) 1.70 - 7.00 10*3/mm3    Lymphocytes, Absolute 1.96 0.70 - 3.10 10*3/mm3    Monocytes, Absolute 0.38 0.10 - 0.90 10*3/mm3    Eosinophils, Absolute 0.09 0.00 - 0.40 10*3/mm3    Basophils, Absolute 0.02 0.00 - 0.20 10*3/mm3    Immature Grans, Absolute 0.00 0.00 - 0.05 10*3/mm3   Urinalysis With Microscopic If Indicated (No Culture) - Urine, Clean Catch    Collection Time: 02/10/25  2:28 PM    Specimen: Urine, Clean Catch   Result Value Ref Range    Color, UA Yellow Yellow, Straw    Appearance, UA Cloudy (A) Clear    pH, UA 6.0 5.0 - 8.0    Specific Gravity, UA 1.014 1.005 - 1.030    Glucose, UA Negative Negative    Ketones, UA Negative Negative    Bilirubin, UA Negative Negative    Blood, UA Negative Negative    Protein, UA Negative Negative    Leuk Esterase, UA Moderate (2+) (A) Negative    Nitrite, UA Negative Negative    Urobilinogen, UA 0.2 E.U./dL 0.2 - 1.0 E.U./dL   Urinalysis, Microscopic Only - Urine, Clean Catch    Collection Time: 02/10/25  2:28 PM    Specimen: Urine, Clean Catch   Result Value Ref Range    RBC, UA None Seen None Seen, 0-2 /HPF    WBC, UA 6-10 (A) None Seen, 0-2 /HPF    Bacteria, UA  3+ (A) None Seen /HPF    Squamous Epithelial Cells, UA 3-6 (A) None Seen, 0-2 /HPF    Hyaline Casts, UA None Seen None Seen /LPF    Methodology Manual Light Microscopy          RADIOLOGY  No Radiology Exams Resulted Within Past 24 Hours      MEDICATIONS GIVEN IN ER  Medications - No data to display      ORDERS PLACED DURING THIS VISIT:  Orders Placed This Encounter   Procedures    CT Abdomen Pelvis Without Contrast    Comprehensive Metabolic Panel    Protime-INR    CBC Auto Differential    Urinalysis With Microscopic If Indicated (No Culture) - Urine, Clean Catch    Urinalysis, Microscopic Only - Urine, Clean Catch    Initiate Observation Status    CBC & Differential         OUTPATIENT MEDICATION MANAGEMENT:  No current Epic-ordered facility-administered medications on file.     Current Outpatient Medications Ordered in Epic   Medication Sig Dispense Refill    acetaminophen (TYLENOL) 500 MG tablet Take 1 tablet by mouth 2 (Two) Times a Day.      docusate sodium (COLACE) 100 MG capsule Take 1 capsule by mouth Daily.      ELDERBERRY PO Take 2 tablets by mouth Daily.      furosemide (LASIX) 40 MG tablet Take 0.5 tablets by mouth Daily. Taking 20mg 90 tablet 1    lidocaine (XYLOCAINE) 5 % ointment APPLY 1 A THIN LAYER TOPICALLY DAILY AS DIRECTED 35.44 g 5    Magnesium Oxide -Mg Supplement 400 (240 Mg) MG tablet TAKE 1 TABLET BY MOUTH DAILY 90 tablet 1    mesalamine (APRISO) 0.375 g 24 hr capsule TAKE FOUR CAPSULES BY MOUTH DAILY 360 capsule 2    mesalamine (CANASA) 1000 MG suppository UNWRAP AND INSERT 1 SUPPOSITORY RECTALLY EVERY NIGHT AT BEDTIME (Patient taking differently: Insert 1 suppository into the rectum At Night As Needed (pain/bleeding).) 30 suppository 0    pantoprazole (PROTONIX) 40 MG EC tablet TAKE 1 TABLET BY MOUTH DAILY 90 tablet 1    spironolactone (ALDACTONE) 25 MG tablet Take 0.5 tablets by mouth Daily.      tacrolimus (PROTOPIC) 0.1 % ointment       valACYclovir (VALTREX) 1000 MG tablet Take 1 tablet  by mouth Daily.      vitamin B-12 (CYANOCOBALAMIN) 1000 MCG tablet TAKE 1 TABLET BY MOUTH DAILY 90 tablet 1         PROCEDURES  Procedures            PROGRESS, DATA ANALYSIS, CONSULTS, AND MEDICAL DECISION MAKING  All labs have been independently interpreted by me.  All radiology studies have been reviewed by me. All EKG's have been independently viewed and interpreted by me.  Discussion below represents my analysis of pertinent findings related to patient's condition, differential diagnosis, treatment plan and final disposition.    Differential diagnosis includes but is not limited to colitis, diverticulitis, UTI, pyelonephritis, GI bleed, ulcerative colitis, anemia.    Clinical Scores:                                       ED Course as of 02/10/25 1608   Mon Feb 10, 2025   1305 Hemoglobin(!): 11.4 [TR]   1543 CT Abdomen Pelvis Without Contrast  My independent interpretation of the imaging study is large right renal cyst [TR]   1554 Platelets(!): 101  Chronically low [TR]   1555 Creatinine(!): 1.21  Chronic renal insufficiency [TR]   1555 Bacteria, UA(!): 3+ [TR]   1555 WBC, UA(!): 6-10 [TR]   1605 Hemoglobin(!): 11.4  Previously 13 [TR]   1607 I reviewed the workup findings with the patient and family at the bedside.  Answered all questions.  Her CT interpretation is pending.  Given her age, history of ulcerative colitis, dropping hemoglobin, bleeding plan admission.  They are agreeable.  I discussed with Dr. Maher with [facility].  She agrees to admit. [TR]      ED Course User Index  [TR] Rishabh Mccracken MD             AS OF 16:08 EST VITALS:    BP - 110/59  HR - 60  TEMP - 97.7 °F (36.5 °C)  O2 SATS - 100%    COMPLEXITY OF CARE  The patient requires admission.      DIAGNOSIS  Final diagnoses:   Acute abdominal pain   Acute lower GI bleeding   Ulcerative colitis with complication, unspecified location   Chronic renal impairment, unspecified CKD stage   Thrombocytopenia         DISPOSITION  ED Disposition       ED  Disposition   Decision to Admit    Condition   --    Comment   Level of Care: Telemetry [5]   Diagnosis: Acute lower GI bleeding [606838]   Admitting Physician: RAMIREZ GORDON [[id number]]   Attending Physician: RAMIREZ GORDON [[id number]]   Is patient appropriate for Inpatient Observation Unit?: Yes [1]                  Please note that portions of this document were completed with a voice recognition program.    Note Disclaimer: At [facility], we believe that sharing information builds trust and better relationships. You are receiving this note because you recently visited [facility]. It is possible you will see health information before a provider has talked with you about it. This kind of information can be easy to misunderstand. To help you fully understand what it means for your health, we urge you to discuss this note with your provider.         Rishabh Mccracken MD  02/10/25 [id number]

## 2025-02-10 NOTE — ED TRIAGE NOTES
Patient to ED via EMS from home c/o RLQ abdominal pain and blood in stool that began at 0700 this morning.

## 2025-02-11 ENCOUNTER — APPOINTMENT (OUTPATIENT)
Dept: GENERAL RADIOLOGY | Facility: HOSPITAL | Age: OVER 89
End: 2025-02-11
Payer: MEDICARE

## 2025-02-11 LAB
ANION GAP SERPL CALCULATED.3IONS-SCNC: 4 MMOL/L (ref 5–15)
BUN SERPL-MCNC: 21 MG/DL (ref 8–23)
BUN/CREAT SERPL: 26.6 (ref 7–25)
CALCIUM SPEC-SCNC: 9.5 MG/DL (ref 8.6–10.5)
CHLORIDE SERPL-SCNC: 112 MMOL/L (ref 98–107)
CO2 SERPL-SCNC: 24 MMOL/L (ref 22–29)
CREAT SERPL-MCNC: 0.79 MG/DL (ref 0.57–1)
DEPRECATED RDW RBC AUTO: 44.4 FL (ref 37–54)
EGFRCR SERPLBLD CKD-EPI 2021: 71.6 ML/MIN/1.73
ERYTHROCYTE [DISTWIDTH] IN BLOOD BY AUTOMATED COUNT: 11.7 % (ref 12.3–15.4)
GLUCOSE SERPL-MCNC: 77 MG/DL (ref 65–99)
HCT VFR BLD AUTO: 32.3 % (ref 34–46.6)
HGB BLD-MCNC: 11.2 G/DL (ref 12–15.9)
MCH RBC QN AUTO: 36.1 PG (ref 26.6–33)
MCHC RBC AUTO-ENTMCNC: 34.7 G/DL (ref 31.5–35.7)
MCV RBC AUTO: 104.2 FL (ref 79–97)
PLATELET # BLD AUTO: 99 10*3/MM3 (ref 140–450)
PMV BLD AUTO: 9.6 FL (ref 6–12)
POTASSIUM SERPL-SCNC: 4.8 MMOL/L (ref 3.5–5.2)
RBC # BLD AUTO: 3.1 10*6/MM3 (ref 3.77–5.28)
SODIUM SERPL-SCNC: 140 MMOL/L (ref 136–145)
WBC NRBC COR # BLD AUTO: 2.36 10*3/MM3 (ref 3.4–10.8)

## 2025-02-11 PROCEDURE — 97161 PT EVAL LOW COMPLEX 20 MIN: CPT

## 2025-02-11 PROCEDURE — 74018 RADEX ABDOMEN 1 VIEW: CPT

## 2025-02-11 PROCEDURE — 99214 OFFICE O/P EST MOD 30 MIN: CPT | Performed by: NURSE PRACTITIONER

## 2025-02-11 PROCEDURE — 85027 COMPLETE CBC AUTOMATED: CPT | Performed by: INTERNAL MEDICINE

## 2025-02-11 PROCEDURE — G0378 HOSPITAL OBSERVATION PER HR: HCPCS

## 2025-02-11 PROCEDURE — 80048 BASIC METABOLIC PNL TOTAL CA: CPT | Performed by: INTERNAL MEDICINE

## 2025-02-11 PROCEDURE — 97530 THERAPEUTIC ACTIVITIES: CPT

## 2025-02-11 RX ORDER — ACETAMINOPHEN 500 MG
500 TABLET ORAL EVERY 6 HOURS PRN
Status: DISCONTINUED | OUTPATIENT
Start: 2025-02-11 | End: 2025-02-12 | Stop reason: HOSPADM

## 2025-02-11 RX ORDER — HYDROCORTISONE ACETATE 25 MG/1
25 SUPPOSITORY RECTAL 2 TIMES DAILY
Status: DISCONTINUED | OUTPATIENT
Start: 2025-02-11 | End: 2025-02-12 | Stop reason: HOSPADM

## 2025-02-11 RX ADMIN — ACETAMINOPHEN 500 MG: 500 TABLET, FILM COATED ORAL at 22:39

## 2025-02-11 RX ADMIN — Medication 1000 MCG: at 09:18

## 2025-02-11 RX ADMIN — Medication 12.5 MG: at 09:19

## 2025-02-11 RX ADMIN — MESALAMINE 1.5 G: 0.38 CAPSULE, EXTENDED RELEASE ORAL at 09:19

## 2025-02-11 RX ADMIN — HYDROCORTISONE ACETATE 25 MG: 25 SUPPOSITORY RECTAL at 11:10

## 2025-02-11 RX ADMIN — DOCUSATE SODIUM 100 MG: 100 CAPSULE, LIQUID FILLED ORAL at 09:20

## 2025-02-11 RX ADMIN — VALACYCLOVIR 1000 MG: 500 TABLET, FILM COATED ORAL at 09:20

## 2025-02-11 RX ADMIN — PANTOPRAZOLE SODIUM 40 MG: 40 TABLET, DELAYED RELEASE ORAL at 09:20

## 2025-02-11 RX ADMIN — FUROSEMIDE 20 MG: 20 TABLET ORAL at 09:18

## 2025-02-11 RX ADMIN — HYDROCORTISONE ACETATE 25 MG: 25 SUPPOSITORY RECTAL at 22:40

## 2025-02-11 NOTE — PLAN OF CARE
Goal Outcome Evaluation:  Plan of Care Reviewed With: patient, child           Outcome Evaluation: Patient is an 89 y.o female who presented to [facility] with an acute lower GI bleed. Patient lives at home with 2 of her children. Patient is independent at baseline and uses a rwx for mobility. Today patient sat up to EOB with SBA. Patient stood with CGA and ambulated around unit with support of rwx. Gait slow but mostly steady with no overt LOB noted. Patient in BR with daughter at end of session. Recommend patient continue ambulating in hallway with nsg at least 3x/day. Acute PT will continue to monitor peripherally. Anticipate return home with assist at d/c.    Anticipated Discharge Disposition (PT): home with assist

## 2025-02-11 NOTE — PLAN OF CARE
Problem: Adult Inpatient Plan of Care  Goal: Plan of Care Review  Outcome: Progressing  Goal: Patient-Specific Goal (Individualized)  Outcome: Progressing  Goal: Absence of Hospital-Acquired Illness or Injury  Outcome: Progressing  Goal: Optimal Comfort and Wellbeing  Outcome: Progressing  Intervention: Provide Person-Centered Care  Recent Flowsheet Documentation  Taken 2/10/2025 1730 by Lisa Valles, RN  Trust Relationship/Rapport:   care explained   choices provided  Goal: Readiness for Transition of Care  Outcome: Progressing   Goal Outcome Evaluation:

## 2025-02-11 NOTE — PROGRESS NOTES
Name: Brittany Villeda ADMIT: 2/10/2025   : 1935  PCP: Mega Esparza MD    MRN: [id number] LOS: 0 days   AGE/SEX: 89 y.o. female  ROOM: [facility]     Subjective   Subjective   Resting in bed, daughter at bedside.  No further rectal bleeding noted.  KUB just completed.  Will follow-up results.       Objective   Objective   Vital Signs  Temp:  [97.5 °F (36.4 °C)-98.1 °F (36.7 °C)] 98.1 °F (36.7 °C)  Heart Rate:  [60-62] 62  Resp:  [15-16] 16  BP: ()/(47-64) 109/59  SpO2:  [96 %-100 %] 98 %  on   ;   Device (Oxygen Therapy): room air  Body mass index is 27.49 kg/m².  Physical Exam  Constitutional:       General: She is not in acute distress.     Appearance: Normal appearance. She is not ill-appearing.   Cardiovascular:      Rate and Rhythm: Normal rate and regular rhythm.   Pulmonary:      Effort: Pulmonary effort is normal. No respiratory distress.      Breath sounds: Normal breath sounds. No wheezing.   Abdominal:      General: Abdomen is flat. Bowel sounds are normal.      Palpations: Abdomen is soft.      Tenderness: There is abdominal tenderness (extending across right and left mid-abdomen).   Musculoskeletal:         General: No swelling or tenderness.      Right lower leg: No edema.      Left lower leg: No edema.   Skin:     General: Skin is warm and dry.   Neurological:      General: No focal deficit present.      Mental Status: She is alert and oriented to person, place, and time. Mental status is at baseline.         Results Review     I reviewed the patient's new clinical results.  Results from last 7 days   Lab Units 02/11/25  0533 02/10/25  2029 02/10/25  1239   WBC 10*3/mm3 2.36*  --  3.97   HEMOGLOBIN g/dL 11.2* 11.6* 11.4*   PLATELETS 10*3/mm3 99*  --  101*     Results from last 7 days   Lab Units 02/11/25  0533 02/10/25  1239   SODIUM mmol/L 140 135*   POTASSIUM mmol/L 4.8 4.9   CHLORIDE mmol/L 112* 104   CO2 mmol/L 24.0 25.0   BUN mg/dL 21 28*   CREATININE mg/dL 0.79 1.21*  "  GLUCOSE mg/dL 77 90   Estimated Creatinine Clearance: 48.9 mL/min (by C-G formula based on SCr of 0.79 mg/dL).  Results from last 7 days   Lab Units 02/10/25  1239   ALBUMIN g/dL 3.7   BILIRUBIN mg/dL 0.6   ALK PHOS U/L 60   AST (SGOT) U/L 13   ALT (SGPT) U/L <5     Results from last 7 days   Lab Units 02/11/25  0533 02/10/25  1239   CALCIUM mg/dL 9.5 10.2   ALBUMIN g/dL  --  3.7       COVID19   Date Value Ref Range Status   01/18/2025 Not Detected Not Detected - Ref. Range Final   12/20/2024 Detected (C) Not Detected - Ref. Range Final     No results found for: \"HGBA1C\", \"POCGLU\"    XR Abdomen KUB  Narrative: XR ABDOMEN KUB-     INDICATIONS: Lower abdominal discomfort     TECHNIQUE: SUPINE VIEWS OF THE ABDOMEN     COMPARISON:  image from CT from 2/10/2025     FINDINGS:      The bowel gas pattern is nonspecific. No supine evidence for free  intraperitoneal gas. A mild volume of stool is apparent in the colon.  Follow-up/further evaluation can be obtained as indications persist.     Impression:    As described.        This report was finalized on 2/11/2025 11:42 AM by Dr. Nas Maharaj M.D on Workstation: [id number]       Scheduled Medications  docusate sodium, 100 mg, Oral, Daily  furosemide, 20 mg, Oral, Daily  hydrocortisone, 25 mg, Rectal, BID  lidocaine, , Topical, Daily  mesalamine, 1.5 g, Oral, Daily  pantoprazole, 40 mg, Oral, Daily  spironolactone, 12.5 mg, Oral, Daily  valACYclovir, 1,000 mg, Oral, Daily  vitamin B-12, 1,000 mcg, Oral, Daily    Infusions  sodium chloride, 75 mL/hr, Last Rate: 75 mL/hr (02/10/25 9406)    Diet  Diet: Liquid; Clear Liquid; Fluid Consistency: Thin (IDDSI 0)         I have personally reviewed:  [x]  Laboratory   []  Microbiology   []  Radiology   [x]  EKG/Telemetry   []  Cardiology/Vascular   []  Pathology   []  Records    Assessment/Plan     Active Hospital Problems    Diagnosis  POA    **Acute lower GI bleeding [K92.2]  Yes    Chronic heart failure with preserved " ejection fraction [I50.32]  Yes    COPD (chronic obstructive pulmonary disease) [J44.9]  Yes    Paroxysmal atrial fibrillation [I48.0]  Yes    History of atrial fibrillation [Z86.79]  Not Applicable    MGUS (monoclonal gammopathy of unknown significance) [D47.2]  Yes    HUGO (obstructive sleep apnea) [G47.33]  Yes    Pulmonary hypertension [I27.20]  Yes    Essential hypertension [I10]  Yes      Resolved Hospital Problems   No resolved problems to display.       89 y.o. female admitted with Acute lower GI bleeding.    Ulcerative colitits  Rectal bleeding  Abnormal CT  -stable fro mprior CT Sept 2024  - GI consulted, continuing home Apriso, starting on steroid suppositories  - KUB today only showed mild stool burden  -GI has cleared for clinically diet, possible sigmoidoscopy if rectal bleeding is persistent    HTN  Persistent atrial fibrillation s/p PPM placement  Chronic diastolic CHF  Chronic right sided heart failure  Pulmonary HTN  - follows with Dr. Culp and Dr. Win in heart failure clinic  - cont home lasix, aldactone, not on SGLT2 due to vaginal candidiasis    HUGO  - ok for home CPAP if available    COPD  - no e/o exacerbation    Immobility syndrome  - PT/OT to see    SCDs for DVT prophylaxis.  DNR.  Discussed with patient, family, nursing staff, and care team on multidisciplinary rounds.  Anticipated discharge Pending PT and/or OT marga, PEGGY Goncalves MD  [facility]  02/11/25  16:15 EST    I wore protective equipment throughout this patient encounter including gloves.  Hand hygiene was performed before donning protective equipment and after removal when leaving the room.    Expected Discharge Date and Time       Expected Discharge Date Expected Discharge Time    Feb 12, 2025              Copied text in this note has been reviewed and is accurate as of 02/11/25.

## 2025-02-11 NOTE — CONSULTS
Gastroenterology   Initial Inpatient Consult Note    Referring Provider: Dr. Goncalves    Reason for Consultation: GI bleed    Subjective     History of present illness:    89 y.o. female with history of ulcerative colitis, cholecystectomy, COPD, pulmonary hypertension, CKD stage III, CHF, and A-fib who presents with rectal bleeding and abdominal discomfort.  Daughter at bedside.    Patient reports some scant BRBPR with her BMs for at least 2 weeks.  Bleeding worsened yesterday so she came to the hospital.  She has seen some blood mixed in her stool.  Hemoglobin was 13.3 three weeks ago, it dropped to 11.4 at admission and remained stable.  She has bilateral lower abdominal discomfort and is tender on exam.  She has history of constipation, currently denies constipation or diarrhea or straining for BMs.  She has 2-3 formed BMs daily.  Denies rectal discomfort.  Denies fever or chills.    Ulcerative colitis managed with Apriso 1.5 g daily, she follows with Dr. English.  She has history of rectal bleeding in 2022 and 2024.  Most recent sigmoidoscopy in 2022 showed UC of the proctosigmoid colon with only mild inflammation.  Rectal bleeding/discomfort in 2024 improved with lidocaine ointment.        Results Reviewed:  Progress Notes by Micaela English MD (06/03/2024 11:15)     CBC (No Diff) (02/11/2025 05:33) Hgb 11.2  Basic Metabolic Panel (02/11/2025 05:33) unremarkable    CT Abdomen Pelvis Without Contrast (02/10/2025 15:27)  Slight asymmetric soft tissue thickening extending inferiorly from the left aspect of the anus through the subcutaneous tissues is present, similar that seen on 9/20/2024, atelectasis vs. PNA, diverticulosis    FLEXIBLE SIGMOIDOSCOPY (11/02/2022 10:53) proctosigmoid ulcerative colitis with mild inflammation, diverticulosis        Past Medical History:  Past Medical History:   Diagnosis Date    Abdominal aortic aneurysm     Anemia     Arrhythmia     Arthritis     Asthma     Bradycardia     CHF  (congestive heart failure) 04/2023    Choledocholithiasis 05/09/2021    Colitis     Diastolic dysfunction     Essential hypertension 05/12/2016    GERD (gastroesophageal reflux disease)     Heart block     Hypertension     Hypertensive heart disease     Hyperthyroidism     REPORTS ENLARGED    Inguinal hernia     Kidney stone     Leukopenia     MGUS (monoclonal gammopathy of unknown significance)     Nephrolithiasis     Pacemaker     Paroxysmal atrial fibrillation     Peptic ulceration     Pressure ulcer     REPORTS ON COCCYX. INSTR TO NOTIFY DR BAIRES'S OFFICE    PSVT (paroxysmal supraventricular tachycardia)     Pulmonary hypertension     Rectal bleed     Sleep apnea     NO DEVICE    Subdural hematoma     Systemic hypertension     Trifascicular block     Ulcerative rectosigmoiditis without complication     Ventricular tachycardia     nonsustained     Past Surgical History:  Past Surgical History:   Procedure Laterality Date    BACK SURGERY      lumbar fusion    DUSTY HOLE Left 08/08/2021    Procedure: Left-sided dusty holes for evacuation of subdural hematoma;  Surgeon: Gustavo Callaway MD;  Location: [facility] MAIN OR;  Service: Neurosurgery;  Laterality: Left;    CARDIAC ELECTROPHYSIOLOGY PROCEDURE N/A 11/07/2018    Procedure: Pacemaker DC new   [place];  Surgeon: Jesus Granda MD;  Location: [facility] CATH INVASIVE LOCATION;  Service: Cardiology    CHOLECYSTECTOMY      COLONOSCOPY  06/16/2014    colitis, cryptitis,  tics, NBIH, TA w/low grade dysplasia    COLONOSCOPY N/A 10/28/2019    Procedure: COLONOSCOPY WITH COLD AND HOT POLYPECTOMIES;  Surgeon: Micaela English MD;  Location: [facility] ENDOSCOPY;  Service: Gastroenterology    ENDOSCOPY N/A 05/13/2021    Procedure: ESOPHAGOGASTRODUODENOSCOPY with BX;  Surgeon: Stefan Mcfadden MD;  Location: [facility] ENDOSCOPY;  Service: Gastroenterology;  Laterality: N/A;  EPIGASTRIC PAIN  --DUODENAL ULCER, HEMORRHAGIC GASTRITIS, HIATAL HERNIA     ENDOSCOPY N/A  10/11/2022    Procedure: ESOPHAGOGASTRODUODENOSCOPY;  Surgeon: Micaela English MD;  Location:  [facility] ENDOSCOPY;  Service: Gastroenterology;  Laterality: N/A;  PRE- MELENA  POST- ESOPHAGITIS    HEMORRHOIDECTOMY      HERNIA REPAIR      umbilical    HYSTERECTOMY      INGUINAL HERNIA REPAIR Left 2023    Procedure: INGUINAL HERNIA REPAIR LEFT;  Surgeon: Antonio Foster MD;  Location:  [facility] OR [place];  Service: General;  Laterality: Left;    JOINT REPLACEMENT Bilateral     KNEES    MYOMECTOMY      PACEMAKER IMPLANTATION      SHOULDER SURGERY      SIGMOIDOSCOPY N/A 2022    Procedure: SIGMOIDOSCOPY FLEXIBLE WITH COLD BIOPSIES AND ASPIRATE;  Surgeon: Micaela English MD;  Location:  [facility] ENDOSCOPY;  Service: Gastroenterology;  Laterality: N/A;  PRE- RECTAL BLEEDING  POST- HEMORRHOIDS, DIVERTICULOSIS, COLITIS    SINUS SURGERY      TOE NAIL AMPUTATION  2019    TONSILLECTOMY        Social History:   Social History     Tobacco Use    Smoking status: Former     Current packs/day: 0.00     Average packs/day: 1.5 packs/day for 12.0 years (18.0 ttl pk-yrs)     Types: Cigarettes     Start date:      Quit date:      Years since quittin.1     Passive exposure: Past    Smokeless tobacco: Never    Tobacco comments:     QUIT SMOKING    Substance Use Topics    Alcohol use: No     Comment: caffeine - decaf coffee      Family History:  Family History   Problem Relation Age of Onset    Diabetes Mother     Breast cancer Sister     Kidney cancer Sister     Heart disease Sister     Prostate cancer Brother     Prostate cancer Brother     Prostate cancer Brother     Malig Hyperthermia Neg Hx        Home Meds:  Medications Prior to Admission   Medication Sig Dispense Refill Last Dose/Taking    acetaminophen (TYLENOL) 500 MG tablet Take 1 tablet by mouth 2 (Two) Times a Day.   2/10/2025    docusate sodium (COLACE) 100 MG capsule Take 1 capsule by mouth Daily.   2/10/2025    ELDERBERRY PO Take 2 tablets by mouth  Daily.   2/10/2025    furosemide (LASIX) 40 MG tablet Take 0.5 tablets by mouth Daily. Taking 20mg 90 tablet 1 2/10/2025    lidocaine (XYLOCAINE) 5 % ointment APPLY 1 A THIN LAYER TOPICALLY DAILY AS DIRECTED 35.44 g 5 2/10/2025    Magnesium Oxide -Mg Supplement 400 (240 Mg) MG tablet TAKE 1 TABLET BY MOUTH DAILY 90 tablet 1 2/10/2025    mesalamine (APRISO) 0.375 g 24 hr capsule TAKE FOUR CAPSULES BY MOUTH DAILY 360 capsule 2 2/10/2025    mesalamine (CANASA) 1000 MG suppository UNWRAP AND INSERT 1 SUPPOSITORY RECTALLY EVERY NIGHT AT BEDTIME (Patient taking differently: Insert 1 suppository into the rectum At Night As Needed (pain/bleeding).) 30 suppository 0 2/10/2025    pantoprazole (PROTONIX) 40 MG EC tablet TAKE 1 TABLET BY MOUTH DAILY 90 tablet 1 2/10/2025    spironolactone (ALDACTONE) 25 MG tablet Take 0.5 tablets by mouth Daily.   2/10/2025    tacrolimus (PROTOPIC) 0.1 % ointment    2/10/2025    valACYclovir (VALTREX) 1000 MG tablet Take 1 tablet by mouth Daily.   2/10/2025    vitamin B-12 (CYANOCOBALAMIN) 1000 MCG tablet TAKE 1 TABLET BY MOUTH DAILY 90 tablet 1 2/10/2025     Current Meds:   docusate sodium, 100 mg, Oral, Daily  furosemide, 20 mg, Oral, Daily  lidocaine, , Topical, Daily  mesalamine, 1.5 g, Oral, Daily  pantoprazole, 40 mg, Oral, Daily  spironolactone, 12.5 mg, Oral, Daily  valACYclovir, 1,000 mg, Oral, Daily  vitamin B-12, 1,000 mcg, Oral, Daily      Allergies:  Allergies   Allergen Reactions    Codeine Hallucinations     Tolerates hydromorphone    Amitriptyline Rash    Amoxicillin-Pot Clavulanate Rash    Aspirin Unknown - Low Severity     Patient doesn't know why    Carisoprodol-Aspirin-Codeine Palpitations    Iodinated Contrast Media Rash    Latex Rash    Naproxen Rash    Nsaids Unknown - Low Severity     UNSURE OF REACTION    Soma Compound With Codeine [Carisoprodol-Aspirin-Codeine] Rash    Sulfa Antibiotics Rash    Tramadol Palpitations     heart races          Review of Systems  Review of  Systems   Constitutional: Negative.    HENT: Negative.     Eyes: Negative.    Respiratory: Negative.     Cardiovascular: Negative.    Gastrointestinal:  Positive for abdominal pain and blood in stool. Negative for constipation, diarrhea, nausea, rectal pain and vomiting.   Endocrine: Negative.    Genitourinary: Negative.    Musculoskeletal: Negative.    Skin: Negative.    Allergic/Immunologic: Negative.    Neurological: Negative.    Hematological: Negative.    Psychiatric/Behavioral: Negative.           Objective     Vital Signs  Temp:  [97.5 °F (36.4 °C)-97.9 °F (36.6 °C)] 97.5 °F (36.4 °C)  Heart Rate:  [60] 60  Resp:  [15-18] 16  BP: ()/(51-63) 108/60      Physical Exam  Vitals reviewed.   Constitutional:       Appearance: Normal appearance.   HENT:      Head: Normocephalic.      Nose: Nose normal.   Eyes:      Pupils: Pupils are equal, round, and reactive to light.   Cardiovascular:      Rate and Rhythm: Normal rate.      Pulses: Normal pulses.   Pulmonary:      Effort: Pulmonary effort is normal.      Breath sounds: Normal breath sounds.   Abdominal:      General: There is no distension.      Palpations: Abdomen is soft.      Tenderness: There is abdominal tenderness.   Genitourinary:     Rectum: Normal.   Musculoskeletal:         General: Normal range of motion.      Cervical back: Normal range of motion.   Skin:     General: Skin is warm and dry.   Neurological:      General: No focal deficit present.      Mental Status: She is alert and oriented to person, place, and time.   Psychiatric:         Mood and Affect: Mood normal.             Results Review:     Results from last 7 days   Lab Units 02/11/25  0533 02/10/25  2029 02/10/25  1239   WBC 10*3/mm3 2.36*  --  3.97   HEMOGLOBIN g/dL 11.2* 11.6* 11.4*   HEMATOCRIT % 32.3* 35.7 36.1   PLATELETS 10*3/mm3 99*  --  101*     Results from last 7 days   Lab Units 02/11/25  0533 02/10/25  1239   SODIUM mmol/L 140 135*   POTASSIUM mmol/L 4.8 4.9   CHLORIDE  mmol/L 112* 104   CO2 mmol/L 24.0 25.0   BUN mg/dL 21 28*   CREATININE mg/dL 0.79 1.21*   CALCIUM mg/dL 9.5 10.2   BILIRUBIN mg/dL  --  0.6   ALK PHOS U/L  --  60   ALT (SGPT) U/L  --  <5   AST (SGOT) U/L  --  13   GLUCOSE mg/dL 77 90     Results from last 7 days   Lab Units 02/10/25  1239   INR  1.24*     Lab Results   Lab Value Date/Time    LIPASE 21 09/20/2024 1224    LIPASE 20 08/21/2023 1608    LIPASE 19 11/01/2022 1810    LIPASE 14 05/09/2021 1109    LIPASE 19 05/04/2019 1239       Radiology:  CT Abdomen Pelvis Without Contrast   Final Result   FINDINGS AND impression:   Please note evaluation is suboptimal without intravenous contrast.   Subcentimeter nodule within the lingula is only partially seen and   corresponds with a 0.7 cm nodule seen since 5/9/2021. Pacemaker wires   only partially seen.       Bibasilar pulmonary opacifications present, right greater than left with   findings of associated volume loss. However, findings are only partially   seen and incompletely evaluated. FINDINGS AT LEAST IN PART ARE   SUGGESTIVE OF ATELECTASIS; HOWEVER, PNEUMONIA CANNOT BE EXCLUDED IN THE   APPROPRIATE CLINICAL CONTEXT. CORRELATION PATIENT HISTORY RECOMMENDED   FOLLOW-UP CHEST CT IF CLINICALLY INDICATED.       No findings of small bowel obstruction. THE APPENDIX IS NOT SEEN AND   CANNOT BE EVALUATED.       Gallbladder surgically absent. The liver, pancreas, spleen and adrenal   glands have an unremarkable noncontrast CT appearance. Subcentimeter   renal lesions are too small to characterize. Larger hypodense lesions   demonstrate density less than 15 [place] lesions are likely benign per   Bosniak 2019 criteria. No hydronephrosis. However, please note the   bilateral ureters are difficult to follow in their entirety due to lack   of intraperitoneal fat.       No definite abdominal pelvic adenopathy by size criteria is seen. Slight   asymmetric soft tissue thickening extending inferiorly from the left   aspect of the  anus through the subcutaneous tissues is present, similar   that seen on 9/20/2024. Uterus surgically absent. There is colonic   diverticulosis. No free intraperitoneal air is definitively seen;   however, please note evaluation is suboptimal due to lack of   intraperitoneal fat and intravenous contrast. No suspicious lytic or   blastic osseous lesion. For the purpose of this dictation, last   well-formed disc base referred to as L5-S1. Postsurgical changes from   fusion of L4-5 with left pedicle screws and mojgan are present.               This report was finalized on 2/10/2025 4:49 PM by Dr. Edgar Lopez M.D   on Workstation: [id number]                Assessment & Plan   Active Hospital Problems    Diagnosis     **Acute lower GI bleeding        Assessment:  Rectal bleeding  Bilateral lower abdominal discomfort  Abnormal CT - Slight asymmetric soft tissue thickening extending inferiorly from the left aspect of the anus through the subcutaneous tissues is present, similar that seen on 9/20/2024  Mild anemia -stable.  History of constipation  Proctosigmoid ulcerative colitis  S/p cholecystectomy  COPD, pulmonary hypertension  CKD stage III  CHF, A-fib -no OAC        Plan:  Check KUB to evaluate stool burden, patient states she has regular bowel movements but she does have history of constipation which could be contributing to her bilateral lower abdominal discomfort  Continue Apriso 1.5 g daily  Begin hydrocortisone suppositories twice daily  Trend Hgb, monitor for further bleeding  Okay for clear liquid diet from GI standpoint  Consider sigmoidoscopy if rectal bleeding persists  Regarding abnormal CT -appears unchanged compared to last CT 9/2024, hold off on surgical evaluation unless patient develops perianal pain or discharge.        I discussed the patients findings and my recommendations with patient, family, nursing staff, and Dr. Mcfadden .             Beryl Farfan, SHAHID  [facility]  [place]  [address] [id number]  Office: [phone]

## 2025-02-11 NOTE — PLAN OF CARE
Goal Outcome Evaluation:   Patient will remain free from falls while on this unit. Patient will utilize the call light when assistance is needed.

## 2025-02-11 NOTE — THERAPY EVALUATION
Patient Name: Brittany Villeda  : 1935    MRN: [id number]                              Today's Date: 2025       Admit Date: 2/10/2025    Visit Dx:     ICD-10-CM ICD-9-CM   1. Acute abdominal pain  R10.9 789.00     338.19   2. Acute lower GI bleeding  K92.2 578.9   3. Ulcerative colitis with complication, unspecified location  K51.919 556.9   4. Chronic renal impairment, unspecified CKD stage  N18.9 585.9   5. Thrombocytopenia  D69.6 287.5     Patient Active Problem List   Diagnosis    Non-toxic multinodular goiter    Chronic midline low back pain with right-sided sciatica    Essential hypertension    Gastroesophageal reflux disease without esophagitis    Cataract    Pulmonary hypertension    Diastolic dysfunction    HUGO (obstructive sleep apnea)    Trifascicular block    MGUS (monoclonal gammopathy of unknown significance)    Ulcerative rectosigmoiditis without complication    Lichen sclerosus    Heart block    Sleep-related hypoxia    Bradycardia    History of atrial fibrillation    Pacemaker    Paroxysmal SVT (supraventricular tachycardia)    History of chest pain    Palpitations    Paroxysmal atrial fibrillation    Ascending aortic aneurysm    Mediastinal lymphadenopathy    Anemia    Obesity (BMI 30-39.9)    Generalized weakness    Dysautonomia    Hypercalcemia    Hyperparathyroidism    Choledocholithiasis    Duodenal ulcer    Hemorrhagic gastritis    Exertional dyspnea    COPD (chronic obstructive pulmonary disease)    Osteoarthritis of glenohumeral joint    ICH (intracerebral hemorrhage)    Lower GI bleed    Thrombocytopenia    Lichen sclerosus of female genitalia    Senile atrophic vaginitis    Left inguinal hernia    Vulvar pain    Bilateral lower extremity edema    Chronic heart failure with preserved ejection fraction    Acute renal insufficiency    Dysfunction of right cardiac ventricle    COVID-19 virus infection    Shortness of breath    Acute lower GI bleeding     Past Medical History:    Diagnosis Date    Abdominal aortic aneurysm     Anemia     Arrhythmia     Arthritis     Asthma     Bradycardia     CHF (congestive heart failure) 04/2023    Choledocholithiasis 05/09/2021    Colitis     Diastolic dysfunction     Essential hypertension 05/12/2016    GERD (gastroesophageal reflux disease)     Heart block     Hypertension     Hypertensive heart disease     Hyperthyroidism     REPORTS ENLARGED    Inguinal hernia     Kidney stone     Leukopenia     MGUS (monoclonal gammopathy of unknown significance)     Nephrolithiasis     Pacemaker     Paroxysmal atrial fibrillation     Peptic ulceration     Pressure ulcer     REPORTS ON COCCYX. INSTR TO NOTIFY DR BAIRES'S OFFICE    PSVT (paroxysmal supraventricular tachycardia)     Pulmonary hypertension     Rectal bleed     Sleep apnea     NO DEVICE    Subdural hematoma     Systemic hypertension     Trifascicular block     Ulcerative rectosigmoiditis without complication     Ventricular tachycardia     nonsustained     Past Surgical History:   Procedure Laterality Date    BACK SURGERY      lumbar fusion    DUSTY HOLE Left 08/08/2021    Procedure: Left-sided dusty holes for evacuation of subdural hematoma;  Surgeon: Gustavo Callaway MD;  Location: [facility] MAIN OR;  Service: Neurosurgery;  Laterality: Left;    CARDIAC ELECTROPHYSIOLOGY PROCEDURE N/A 11/07/2018    Procedure: Pacemaker DC new   [place];  Surgeon: Jesus Granda MD;  Location: [facility] CATH INVASIVE LOCATION;  Service: Cardiology    CHOLECYSTECTOMY      COLONOSCOPY  06/16/2014    colitis, cryptitis,  tics, NBIH, TA w/low grade dysplasia    COLONOSCOPY N/A 10/28/2019    Procedure: COLONOSCOPY WITH COLD AND HOT POLYPECTOMIES;  Surgeon: Micaela English MD;  Location: [facility] ENDOSCOPY;  Service: Gastroenterology    ENDOSCOPY N/A 05/13/2021    Procedure: ESOPHAGOGASTRODUODENOSCOPY with BX;  Surgeon: Stefan Mcfadden MD;  Location: [facility] ENDOSCOPY;  Service: Gastroenterology;  Laterality: N/A;   EPIGASTRIC PAIN  --DUODENAL ULCER, HEMORRHAGIC GASTRITIS, HIATAL HERNIA     ENDOSCOPY N/A 10/11/2022    Procedure: ESOPHAGOGASTRODUODENOSCOPY;  Surgeon: Micaela English MD;  Location: [facility] ENDOSCOPY;  Service: Gastroenterology;  Laterality: N/A;  PRE- MELENA  POST- ESOPHAGITIS    HEMORRHOIDECTOMY      HERNIA REPAIR      umbilical    HYSTERECTOMY      INGUINAL HERNIA REPAIR Left 9/1/2023    Procedure: INGUINAL HERNIA REPAIR LEFT;  Surgeon: Antonio Foster MD;  Location:  [facility];  Service: General;  Laterality: Left;    JOINT REPLACEMENT Bilateral     KNEES    MYOMECTOMY      PACEMAKER IMPLANTATION      SHOULDER SURGERY      SIGMOIDOSCOPY N/A 11/02/2022    Procedure: SIGMOIDOSCOPY FLEXIBLE WITH COLD BIOPSIES AND ASPIRATE;  Surgeon: Micaela English MD;  Location: [facility] ENDOSCOPY;  Service: Gastroenterology;  Laterality: N/A;  PRE- RECTAL BLEEDING  POST- HEMORRHOIDS, DIVERTICULOSIS, COLITIS    SINUS SURGERY      TOE NAIL AMPUTATION  03/04/2019    TONSILLECTOMY        General Information       Row Name 02/11/25 1140          Physical Therapy Time and Intention    Document Type evaluation  -SM     Mode of Treatment individual therapy;physical therapy  -       Row Name 02/11/25 1140          General Information    Patient Profile Reviewed yes  -SM     Prior Level of Function independent:  -SM     Existing Precautions/Restrictions fall  -SM       Row Name 02/11/25 1140          Living Environment    People in Home child(donaldo), adult  -SM       Row Name 02/11/25 1140          Cognition    Orientation Status (Cognition) oriented x 4  -SM       Row Name 02/11/25 1140          Safety Issues/Impairments Affecting Functional Mobility    Impairments Affecting Function (Mobility) endurance/activity tolerance;strength  -SM               User Key  (r) = Recorded By, (t) = Taken By, (c) = Cosigned By      Initials Name Provider Type    SM Shelly Shoemaker PT Physical Therapist                   Mobility       Row  Name 02/11/25 1140          Bed Mobility    Bed Mobility supine-sit  -     Supine-Sit [place] (Bed Mobility) standby assist  -     Comment, (Bed Mobility) In BR with daughter at end of session  -       Row Name 02/11/25 1140          Sit-Stand Transfer    Sit-Stand [place] (Transfers) contact guard  -     Assistive Device (Sit-Stand Transfers) walker, front-wheeled  -     Comment, (Sit-Stand Transfer) bed slightly elevated  -       Row Name 02/11/25 1140          Gait/Stairs (Locomotion)    [place] Level (Gait) contact guard  -     Assistive Device (Gait) walker, front-wheeled  -     Distance in Feet (Gait) 150  -     Comment, (Gait/Stairs) Gait slow but mostly steady with no overt LOB noted.  -               User Key  (r) = Recorded By, (t) = Taken By, (c) = Cosigned By      Initials Name Provider Type     Shelly Shoemaker PT Physical Therapist                   Obj/Interventions       Row Name 02/11/25 1141          Range of Motion Comprehensive    General Range of Motion bilateral lower extremity ROM WFL  -       Row Name 02/11/25 1141          Strength Comprehensive (MMT)    General Manual Muscle Testing (MMT) Assessment lower extremity strength deficits identified  -     Comment, General Manual Muscle Testing (MMT) Assessment Generalized B LE weakness  -       Row Name 02/11/25 1141          Balance    Balance Assessment sitting static balance;sitting dynamic balance;standing static balance;standing dynamic balance  -     Static Sitting Balance supervision  -     Dynamic Sitting Balance standby assist  -     Position, Sitting Balance sitting edge of bed  -     Static Standing Balance standby assist  -     Dynamic Standing Balance contact guard  -     Position/Device Used, Standing Balance supported;walker, front-wheeled  -     Balance Interventions sitting;standing;sit to stand;supported;dynamic;static  -               User Key  (r) = Recorded By, (t)  = Taken By, (c) = Cosigned By      Initials Name Provider Type    Shelly Tamayo PT Physical Therapist                   Goals/Plan       Row Name 02/11/25 1148          Bed Mobility Goal 1 (PT)    Activity/Assistive Device (Bed Mobility Goal 1, PT) bed mobility activities, all  -SM     [place] Level/Cues Needed (Bed Mobility Goal 1, PT) modified independence  -SM     Time Frame (Bed Mobility Goal 1, PT) 1 week  -SM       Row Name 02/11/25 1148          Transfer Goal 1 (PT)    Activity/Assistive Device (Transfer Goal 1, PT) sit-to-stand/stand-to-sit;bed-to-chair/chair-to-bed;walker, rolling  -SM     [place] Level/Cues Needed (Transfer Goal 1, PT) supervision required  -SM     Time Frame (Transfer Goal 1, PT) 1 week  -SM       Row Name 02/11/25 1148          Gait Training Goal 1 (PT)    Activity/Assistive Device (Gait Training Goal 1, PT) gait (walking locomotion);walker, rolling  -SM     [place] Level (Gait Training Goal 1, PT) modified independence  -SM     Distance (Gait Training Goal 1, PT) 200ft  -SM     Time Frame (Gait Training Goal 1, PT) 1 week  -SM               User Key  (r) = Recorded By, (t) = Taken By, (c) = Cosigned By      Initials Name Provider Type     Shelly Shoemaker PT Physical Therapist                   Clinical Impression       Row Name 02/11/25 1144          Pain    Pretreatment Pain Rating 0/10 - no pain  -SM     Posttreatment Pain Rating 0/10 - no pain  -SM       Row Name 02/11/25 1144          Plan of Care Review    Plan of Care Reviewed With patient;child  -     Outcome Evaluation Patient is an 89 y.o female who presented to [facility] with an acute lower GI bleed. Patient lives at home with 2 of her children. Patient is independent at baseline and uses a rwx for mobility. Today patient sat up to EOB with SBA. Patient stood with CGA and ambulated around unit with support of rwx. Gait slow but mostly steady with no overt LOB noted. Patient in BR with daughter at end of  session. Recommend patient continue ambulating in hallway with nsg at least 3x/day. Acute PT will continue to monitor peripherally. Anticipate return home with assist at d/c.  -       Row Name 02/11/25 1144          Therapy Assessment/Plan (PT)    Rehab Potential (PT) good  -     Criteria for Skilled Interventions Met (PT) yes  -     Therapy Frequency (PT) 3 times/wk  -       Row Name 02/11/25 1144          Vital Signs    Pre Patient Position Supine  -     Intra Patient Position Standing  -     Post Patient Position Sitting  -       Row Name 02/11/25 1144          Positioning and Restraints    Pre-Treatment Position in bed  -     Post Treatment Position bathroom  -     Bathroom notified nsg;sitting;call light within reach;encouraged to call for assist;with family/caregiver  -               User Key  (r) = Recorded By, (t) = Taken By, (c) = Cosigned By      Initials Name Provider Type    Shelly Tamayo PT Physical Therapist                   Outcome Measures       Row Name 02/11/25 1148 02/11/25 0800       How much help from another person do you currently need...    Turning from your back to your side while in flat bed without using bedrails? 4  -SM 3  -JJ    Moving from lying on back to sitting on the side of a flat bed without bedrails? 3  -SM 3  -JJ    Moving to and from a bed to a chair (including a wheelchair)? 3  -SM 3  -JJ    Standing up from a chair using your arms (e.g., wheelchair, bedside chair)? 3  -SM 3  -JJ    Climbing 3-5 steps with a railing? 3  -SM 3  -JJ    To walk in hospital room? 3  -SM 3  -JJ    AM-PAC 6 Clicks Score (PT) 19  - 18  -JJ              User Key  (r) = Recorded By, (t) = Taken By, (c) = Cosigned By      Initials Name Provider Type    Shelly Tamayo PT Physical Therapist    Elaine Tripathi, RN Registered Nurse                                 Physical Therapy Education       Title: PT OT SLP Therapies (Done)       Topic: Physical Therapy (Done)        Point: Mobility training (Done)       Learning Progress Summary            Patient Acceptance, E, VU by  at 2/11/2025 1148                      Point: Home exercise program (Done)       Learning Progress Summary            Patient Acceptance, E, VU by  at 2/11/2025 1148                      Point: Body mechanics (Done)       Learning Progress Summary            Patient Acceptance, E, VU by  at 2/11/2025 1148                      Point: Precautions (Done)       Learning Progress Summary            Patient Acceptance, E, VU by  at 2/11/2025 1148                                      User Key       Initials Effective Dates Name Provider Type Discipline     05/02/22 -  Shelly Shoemaker, PT Physical Therapist PT                  PT Recommendation and Plan     Outcome Evaluation: Patient is an 89 y.o female who presented to [facility] with an acute lower GI bleed. Patient lives at home with 2 of her children. Patient is independent at baseline and uses a rwx for mobility. Today patient sat up to EOB with SBA. Patient stood with CGA and ambulated around unit with support of rwx. Gait slow but mostly steady with no overt LOB noted. Patient in BR with daughter at end of session. Recommend patient continue ambulating in hallway with nsg at least 3x/day. Acute PT will continue to monitor peripherally. Anticipate return home with assist at d/c.     Time Calculation:         PT Charges       Row Name 02/11/25 1149             Time Calculation    Start Time 0938  -      Stop Time 0955  -      Time Calculation (min) 17 min  -      PT Received On 02/11/25  -      PT - Next Appointment 02/13/25  -      PT Goal Re-Cert Due Date 02/18/25  -         Time Calculation- PT    Total Timed Code Minutes- PT 12 minute(s)  -         Timed Charges    32794 - PT Therapeutic Activity Minutes 12  -         Total Minutes    Timed Charges Total Minutes 12  -SM       Total Minutes 12  -SM                User Key  (r) =  Recorded By, (t) = Taken By, (c) = Cosigned By      Initials Name Provider Type     Shelly Shoemaker, PT Physical Therapist                  Therapy Charges for Today       Code Description Service Date Service Provider Modifiers Qty    [id number]  PT THERAPEUTIC ACT EA 15 MIN 2/11/2025 Shelly Shoemaker, PT GP 1    [id number] HC PT EVAL LOW COMPLEXITY 3 2/11/2025 Shelly Shoemaker, PT GP 1            PT G-Codes  AM-PAC 6 Clicks Score (PT): 19  PT Discharge Summary  Anticipated Discharge Disposition (PT): home with assist    Shelly Shoemaker PT  2/11/2025

## 2025-02-11 NOTE — H&P
HISTORY AND PHYSICAL   [facility]        Date of Admission: 2/10/2025  Patient Identification:  Name: Brittany Villeda  Age: 89 y.o.  Sex: female  :  1935  MRN: [id number]                     Primary Care Physician: Mega Esparza MD    Chief Complaint:  89 year old female presented to the emergency room with bloody stool for the last two weeks; she has also had abdominal pain; she is followed by dr ruiz for ulcerative colitis; she denies fever or chills; no diarrhea; no sick contacts    History of Present Illness:   As above    Past Medical History:  Past Medical History:   Diagnosis Date    Abdominal aortic aneurysm     Anemia     Arrhythmia     Arthritis     Asthma     Bradycardia     CHF (congestive heart failure) 2023    Choledocholithiasis 2021    Colitis     Diastolic dysfunction     Essential hypertension 2016    GERD (gastroesophageal reflux disease)     Heart block     Hypertension     Hypertensive heart disease     Hyperthyroidism     REPORTS ENLARGED    Inguinal hernia     Kidney stone     Leukopenia     MGUS (monoclonal gammopathy of unknown significance)     Nephrolithiasis     Pacemaker     Paroxysmal atrial fibrillation     Peptic ulceration     Pressure ulcer     REPORTS ON COCCYX. INSTR TO NOTIFY DR BAIRES'S OFFICE    PSVT (paroxysmal supraventricular tachycardia)     Pulmonary hypertension     Rectal bleed     Sleep apnea     NO DEVICE    Subdural hematoma     Systemic hypertension     Trifascicular block     Ulcerative rectosigmoiditis without complication     Ventricular tachycardia     nonsustained     Past Surgical History:  Past Surgical History:   Procedure Laterality Date    BACK SURGERY      lumbar fusion    DUSTY HOLE Left 2021    Procedure: Left-sided dusty holes for evacuation of subdural hematoma;  Surgeon: Gustavo Callaway MD;  Location: [facility];  Service: Neurosurgery;  Laterality: Left;    CARDIAC ELECTROPHYSIOLOGY PROCEDURE  N/A 11/07/2018    Procedure: Pacemaker DC new   [place];  Surgeon: Jesus Granda MD;  Location: [facility] CATH INVASIVE LOCATION;  Service: Cardiology    CHOLECYSTECTOMY      COLONOSCOPY  06/16/2014    colitis, cryptitis,  tics, NBIH, TA w/low grade dysplasia    COLONOSCOPY N/A 10/28/2019    Procedure: COLONOSCOPY WITH COLD AND HOT POLYPECTOMIES;  Surgeon: Micaela English MD;  Location: [facility] ENDOSCOPY;  Service: Gastroenterology    ENDOSCOPY N/A 05/13/2021    Procedure: ESOPHAGOGASTRODUODENOSCOPY with BX;  Surgeon: Stefan Mcfadden MD;  Location: [facility] ENDOSCOPY;  Service: Gastroenterology;  Laterality: N/A;  EPIGASTRIC PAIN  --DUODENAL ULCER, HEMORRHAGIC GASTRITIS, HIATAL HERNIA     ENDOSCOPY N/A 10/11/2022    Procedure: ESOPHAGOGASTRODUODENOSCOPY;  Surgeon: Micaela English MD;  Location: [facility] ENDOSCOPY;  Service: Gastroenterology;  Laterality: N/A;  PRE- MELENA  POST- ESOPHAGITIS    HEMORRHOIDECTOMY      HERNIA REPAIR      umbilical    HYSTERECTOMY      INGUINAL HERNIA REPAIR Left 9/1/2023    Procedure: INGUINAL HERNIA REPAIR LEFT;  Surgeon: Antonio Foster MD;  Location:  [facility] [place];  Service: General;  Laterality: Left;    JOINT REPLACEMENT Bilateral     KNEES    MYOMECTOMY      PACEMAKER IMPLANTATION      SHOULDER SURGERY      SIGMOIDOSCOPY N/A 11/02/2022    Procedure: SIGMOIDOSCOPY FLEXIBLE WITH COLD BIOPSIES AND ASPIRATE;  Surgeon: Micaela English MD;  Location: [facility] ENDOSCOPY;  Service: Gastroenterology;  Laterality: N/A;  PRE- RECTAL BLEEDING  POST- HEMORRHOIDS, DIVERTICULOSIS, COLITIS    SINUS SURGERY      TOE NAIL AMPUTATION  03/04/2019    TONSILLECTOMY        Home Meds:  Medications Prior to Admission   Medication Sig Dispense Refill Last Dose/Taking    acetaminophen (TYLENOL) 500 MG tablet Take 1 tablet by mouth 2 (Two) Times a Day.   2/10/2025    docusate sodium (COLACE) 100 MG capsule Take 1 capsule by mouth Daily.   2/10/2025    ELDERBERRY PO Take 2 tablets by mouth Daily.    2/10/2025    furosemide (LASIX) 40 MG tablet Take 0.5 tablets by mouth Daily. Taking 20mg 90 tablet 1 2/10/2025    lidocaine (XYLOCAINE) 5 % ointment APPLY 1 A THIN LAYER TOPICALLY DAILY AS DIRECTED 35.44 g 5 2/10/2025    Magnesium Oxide -Mg Supplement 400 (240 Mg) MG tablet TAKE 1 TABLET BY MOUTH DAILY 90 tablet 1 2/10/2025    mesalamine (APRISO) 0.375 g 24 hr capsule TAKE FOUR CAPSULES BY MOUTH DAILY 360 capsule 2 2/10/2025    mesalamine (CANASA) 1000 MG suppository UNWRAP AND INSERT 1 SUPPOSITORY RECTALLY EVERY NIGHT AT BEDTIME (Patient taking differently: Insert 1 suppository into the rectum At Night As Needed (pain/bleeding).) 30 suppository 0 2/10/2025    pantoprazole (PROTONIX) 40 MG EC tablet TAKE 1 TABLET BY MOUTH DAILY 90 tablet 1 2/10/2025    spironolactone (ALDACTONE) 25 MG tablet Take 0.5 tablets by mouth Daily.   2/10/2025    tacrolimus (PROTOPIC) 0.1 % ointment    2/10/2025    valACYclovir (VALTREX) 1000 MG tablet Take 1 tablet by mouth Daily.   2/10/2025    vitamin B-12 (CYANOCOBALAMIN) 1000 MCG tablet TAKE 1 TABLET BY MOUTH DAILY 90 tablet 1 2/10/2025       Allergies:  Allergies   Allergen Reactions    Codeine Hallucinations     Tolerates hydromorphone    Amitriptyline Rash    Amoxicillin-Pot Clavulanate Rash    Aspirin Unknown - Low Severity     Patient doesn't know why    Carisoprodol-Aspirin-Codeine Palpitations    Iodinated Contrast Media Rash    Latex Rash    Naproxen Rash    Nsaids Unknown - Low Severity     UNSURE OF REACTION    Soma Compound With Codeine [Carisoprodol-Aspirin-Codeine] Rash    Sulfa Antibiotics Rash    Tramadol Palpitations     heart races      Immunizations:  Immunization History   Administered Date(s) Administered    FLUAD TRI 65YR+ 09/10/2019    Fluad Quad 65+ 09/10/2019, 09/09/2020    Fluzone High-Dose 65+YRS 10/03/2017, 10/02/2018, 09/10/2019    Fluzone High-Dose 65+yrs 10/22/2021, 11/02/2022, 11/13/2023    Hepatitis A 05/24/2018, 12/11/2018    Pneumococcal Conjugate  13-Valent (PCV13) 2017    Pneumococcal Conjugate 20-Valent (PCV20) 2023    Pneumococcal Polysaccharide (PPSV23) 2018    Zostavax 2011     Social History:   Social History     Social History Narrative    Not on file     Social History     Socioeconomic History    Marital status:     Number of children: 10    Years of education: High School   Tobacco Use    Smoking status: Former     Current packs/day: 0.00     Average packs/day: 1.5 packs/day for 12.0 years (18.0 ttl pk-yrs)     Types: Cigarettes     Start date:      Quit date:      Years since quittin.1     Passive exposure: Past    Smokeless tobacco: Never    Tobacco comments:     QUIT SMOKING    Vaping Use    Vaping status: Never Used   Substance and Sexual Activity    Alcohol use: No     Comment: caffeine - decaf coffee    Drug use: Never    Sexual activity: Defer       Family History:  Family History   Problem Relation Age of Onset    Diabetes Mother     Breast cancer Sister     Kidney cancer Sister     Heart disease Sister     Prostate cancer Brother     Prostate cancer Brother     Prostate cancer Brother     Malig Hyperthermia Neg Hx         Review of Systems  See history of present illness and past medical history.  Patient denies headache, dizziness, syncope, falls, trauma, change in vision, change in hearing, change in taste, changes in weight, changes in appetite, focal weakness, numbness, or paresthesia.  Patient denies chest pain, palpitations, dyspnea, orthopnea, PND, cough, sinus pressure, rhinorrhea, epistaxis, hemoptysis, nausea, vomiting,hematemesis, diarrhea, constipation or hematochezia.  Denies cold or heat intolerance, polydipsia, polyuria, polyphagia. Denies hematuria, pyuria, dysuria, hesitancy, frequency or urgency. Denies consumption of raw and under cooked meats foods or change in water source.       Objective:  T Max 24 hrs: Temp (24hrs), Av.8 °F (36.6 °C), Min:97.7 °F (36.5 °C),  "Max:97.9 °F (36.6 °C)    Vitals Ranges:   Temp:  [97.7 °F (36.5 °C)-97.9 °F (36.6 °C)] 97.9 °F (36.6 °C)  Heart Rate:  [60] 60  Resp:  [16-18] 16  BP: ()/(51-61) 111/52      Exam:  /52 (BP Location: Right arm, Patient Position: Sitting)   Pulse 60   Temp 97.9 °F (36.6 °C) (Oral)   Resp 16   Ht 118.1 cm (46.5\")   Wt 136 kg (299 lb 13.2 oz)   LMP  (LMP Unknown)   SpO2 100%   BMI 97.49 kg/m²     General Appearance:    Alert, cooperative, no distress, appears stated age   Head:    Normocephalic, without obvious abnormality, atraumatic   Eyes:    PERRL, conjunctivae/corneas clear, EOM's intact, both eyes   Ears:    Normal external ear canals, both ears   Nose:   Nares normal, septum midline, mucosa normal, no drainage    or sinus tenderness   Throat:   Lips, mucosa, and tongue normal   Neck:   Supple, symmetrical, trachea midline, no adenopathy;     thyroid:  no enlargement/tenderness/nodules; no carotid    bruit or JVD   Back:     Symmetric, no curvature, ROM normal, no CVA tenderness   Lungs:     Decreased breath sounds bilaterally, respirations unlabored   Chest Wall:    No tenderness or deformity    Heart:    Regular rate and rhythm, S1 and S2 normal, no murmur, rub   or gallop   Abdomen:     Soft, nontender, bowel sounds active all four quadrants,     no masses, no hepatomegaly, no splenomegaly   Extremities:   Extremities normal, atraumatic, no cyanosis or edema                       .    Data Review:  Labs in chart were reviewed.  WBC   Date Value Ref Range Status   02/10/2025 3.97 3.40 - 10.80 10*3/mm3 Final     Hemoglobin   Date Value Ref Range Status   02/10/2025 11.4 (L) 12.0 - 15.9 g/dL Final     Hematocrit   Date Value Ref Range Status   02/10/2025 36.1 34.0 - 46.6 % Final     Platelets   Date Value Ref Range Status   02/10/2025 101 (L) 140 - 450 10*3/mm3 Final     Sodium   Date Value Ref Range Status   02/10/2025 135 (L) 136 - 145 mmol/L Final     Potassium   Date Value Ref Range Status "   02/10/2025 4.9 3.5 - 5.2 mmol/L Final     Chloride   Date Value Ref Range Status   02/10/2025 104 98 - 107 mmol/L Final     CO2   Date Value Ref Range Status   02/10/2025 25.0 22.0 - 29.0 mmol/L Final     BUN   Date Value Ref Range Status   02/10/2025 28 (H) 8 - 23 mg/dL Final     Creatinine   Date Value Ref Range Status   02/10/2025 1.21 (H) 0.57 - 1.00 mg/dL Final     Glucose   Date Value Ref Range Status   02/10/2025 90 65 - 99 mg/dL Final     Calcium   Date Value Ref Range Status   02/10/2025 10.2 8.6 - 10.5 mg/dL Final                Imaging Results (All)       Procedure Component Value Units Date/Time    CT Abdomen Pelvis Without Contrast [[id number]] Collected: 02/10/25 1629     Updated: 02/10/25 1652    Narrative:      CT ABDOMEN AND PELVIS WITHOUT IV CONTRAST     HISTORY: Acute right-sided abdominal pain, blood in stool     TECHNIQUE: Radiation dose reduction techniques were utilized, including  automated exposure control and exposure modulation based on body size.   3 mm images were obtained through the abdomen and pelvis without IV  contrast.     COMPARISON: CT abdomen pelvis 9/20/2024       Impression:      FINDINGS AND impression:  Please note evaluation is suboptimal without intravenous contrast.  Subcentimeter nodule within the lingula is only partially seen and  corresponds with a 0.7 cm nodule seen since 5/9/2021. Pacemaker wires  only partially seen.     Bibasilar pulmonary opacifications present, right greater than left with  findings of associated volume loss. However, findings are only partially  seen and incompletely evaluated. FINDINGS AT LEAST IN PART ARE  SUGGESTIVE OF ATELECTASIS; HOWEVER, PNEUMONIA CANNOT BE EXCLUDED IN THE  APPROPRIATE CLINICAL CONTEXT. CORRELATION PATIENT HISTORY RECOMMENDED  FOLLOW-UP CHEST CT IF CLINICALLY INDICATED.     No findings of small bowel obstruction. THE APPENDIX IS NOT SEEN AND  CANNOT BE EVALUATED.     Gallbladder surgically absent. The liver, pancreas,  spleen and adrenal  glands have an unremarkable noncontrast CT appearance. Subcentimeter  renal lesions are too small to characterize. Larger hypodense lesions  demonstrate density less than 15 Hanse lesions are likely benign per  Bosniak 2019 criteria. No hydronephrosis. However, please note the  bilateral ureters are difficult to follow in their entirety due to lack  of intraperitoneal fat.     No definite abdominal pelvic adenopathy by size criteria is seen. Slight  asymmetric soft tissue thickening extending inferiorly from the left  aspect of the anus through the subcutaneous tissues is present, similar  that seen on 9/20/2024. Uterus surgically absent. There is colonic  diverticulosis. No free intraperitoneal air is definitively seen;  however, please note evaluation is suboptimal due to lack of  intraperitoneal fat and intravenous contrast. No suspicious lytic or  blastic osseous lesion. For the purpose of this dictation, last  well-formed disc base referred to as L5-S1. Postsurgical changes from  fusion of L4-5 with left pedicle screws and mojgan are present.           This report was finalized on 2/10/2025 4:49 PM by Dr. Edgar Lopez M.D  on Workstation: [id number]                 Assessment:  Active Hospital Problems    Diagnosis  POA    **Acute lower GI bleeding [K92.2]  Yes      Resolved Hospital Problems   No resolved problems to display.   Ulcerative colitis  Abdominal pain  Pulmonary hypertension  Copd  Ckd3  Chronic diastolic chf  A fib  Sleep apnea  anemia    Plan:  Will ask gi to see her  Trend labs  Monitor on telemetry  Patient is dnr and daughter is nok  Dw patient, daughter and ed provider    Rosalind Maher MD  2/10/2025  19:45 EST

## 2025-02-12 ENCOUNTER — READMISSION MANAGEMENT (OUTPATIENT)
Dept: CALL CENTER | Facility: HOSPITAL | Age: OVER 89
End: 2025-02-12
Payer: MEDICARE

## 2025-02-12 VITALS
SYSTOLIC BLOOD PRESSURE: 120 MMHG | HEIGHT: 65 IN | RESPIRATION RATE: 18 BRPM | TEMPERATURE: 98.1 F | DIASTOLIC BLOOD PRESSURE: 65 MMHG | BODY MASS INDEX: 27.49 KG/M2 | WEIGHT: 165 LBS | OXYGEN SATURATION: 98 % | HEART RATE: 60 BPM

## 2025-02-12 LAB
ANION GAP SERPL CALCULATED.3IONS-SCNC: 6.8 MMOL/L (ref 5–15)
BUN SERPL-MCNC: 14 MG/DL (ref 8–23)
BUN/CREAT SERPL: 19.4 (ref 7–25)
CALCIUM SPEC-SCNC: 9.7 MG/DL (ref 8.6–10.5)
CHLORIDE SERPL-SCNC: 110 MMOL/L (ref 98–107)
CO2 SERPL-SCNC: 21.2 MMOL/L (ref 22–29)
CREAT SERPL-MCNC: 0.72 MG/DL (ref 0.57–1)
EGFRCR SERPLBLD CKD-EPI 2021: 80 ML/MIN/1.73
GLUCOSE SERPL-MCNC: 87 MG/DL (ref 65–99)
POTASSIUM SERPL-SCNC: 4.6 MMOL/L (ref 3.5–5.2)
SODIUM SERPL-SCNC: 138 MMOL/L (ref 136–145)

## 2025-02-12 PROCEDURE — 80048 BASIC METABOLIC PNL TOTAL CA: CPT | Performed by: STUDENT IN AN ORGANIZED HEALTH CARE EDUCATION/TRAINING PROGRAM

## 2025-02-12 PROCEDURE — 99214 OFFICE O/P EST MOD 30 MIN: CPT | Performed by: NURSE PRACTITIONER

## 2025-02-12 PROCEDURE — G0378 HOSPITAL OBSERVATION PER HR: HCPCS

## 2025-02-12 RX ORDER — HYDROCORTISONE 25 MG/G
CREAM TOPICAL 2 TIMES DAILY
Qty: 28 G | Refills: 0 | Status: SHIPPED | OUTPATIENT
Start: 2025-02-12 | End: 2025-02-19

## 2025-02-12 RX ADMIN — HYDROCORTISONE ACETATE 25 MG: 25 SUPPOSITORY RECTAL at 09:31

## 2025-02-12 RX ADMIN — FUROSEMIDE 20 MG: 20 TABLET ORAL at 09:29

## 2025-02-12 RX ADMIN — DOCUSATE SODIUM 100 MG: 100 CAPSULE, LIQUID FILLED ORAL at 09:29

## 2025-02-12 RX ADMIN — Medication 1000 MCG: at 09:28

## 2025-02-12 RX ADMIN — MESALAMINE 1.5 G: 0.38 CAPSULE, EXTENDED RELEASE ORAL at 09:30

## 2025-02-12 RX ADMIN — VALACYCLOVIR 1000 MG: 500 TABLET, FILM COATED ORAL at 09:30

## 2025-02-12 RX ADMIN — LIDOCAINE: 50 OINTMENT TOPICAL at 09:32

## 2025-02-12 RX ADMIN — Medication 12.5 MG: at 09:29

## 2025-02-12 RX ADMIN — PANTOPRAZOLE SODIUM 40 MG: 40 TABLET, DELAYED RELEASE ORAL at 09:32

## 2025-02-12 NOTE — DISCHARGE SUMMARY
Patient Name: Brittany Villeda  : 1935  MRN: [id number]    Date of Admission: 2/10/2025  Date of Discharge:  2025  Primary Care Physician: Mega Esparza MD      Chief Complaint:   Abdominal Pain and Rectal Bleeding      Discharge Diagnoses     Active Hospital Problems    Diagnosis  POA    **Acute lower GI bleeding [K92.2]  Yes    Chronic heart failure with preserved ejection fraction [I50.32]  Yes    COPD (chronic obstructive pulmonary disease) [J44.9]  Yes    Paroxysmal atrial fibrillation [I48.0]  Yes    History of atrial fibrillation [Z86.79]  Not Applicable    MGUS (monoclonal gammopathy of unknown significance) [D47.2]  Yes    HUGO (obstructive sleep apnea) [G47.33]  Yes    Pulmonary hypertension [I27.20]  Yes    Essential hypertension [I10]  Yes      Resolved Hospital Problems   No resolved problems to display.        Hospital Course     Ms. Villeda is a 89 y.o. female with a history of ulcerative colitis, cholecystectomy, COPD, pulmonary hypertension, CKD stage III, CHF, and A-fib who presented to [facility] initially complaining of rectal bleeding and abdominal pain.  Please see the admitting history and physical for further details.  She was admitted to the hospital for further evaluation and treatment.  GI consulted on admission.  KUB showed mild amount of stool burden.  Patient's home mesalamine was continued and she was initiated on hydrocortisone suppositories.  She had no further rectal bleeding and her bilateral lower abdominal discomfort has resolved.  Patient did have a urinalysis on admission which showed some bacteria but also revealed squamous epithelial cells.  She has no lower urinary tract symptoms, dictating asymptomatic bacteria but not a UTI which does not require antibiotics.   GI is recommending discharging with hydrocortisone suppositories and follow-up with Dr. English.  She should also follow-up with her PCP in a week for posthospital follow-up  visit.    Day of Discharge     Subjective:  Resting in bed, daughters at bedside. Abdominal pain is better. GI has cleared for discharge if she tolerates a diet.     Physical Exam:  Temp:  [97.9 °F (36.6 °C)-98.2 °F (36.8 °C)] 98.1 °F (36.7 °C)  Heart Rate:  [60-62] 60  Resp:  [16-18] 18  BP: (106-120)/(58-65) 120/65  Body mass index is 27.49 kg/m².  Physical Exam  Constitutional:       General: She is not in acute distress.     Appearance: Normal appearance. She is not ill-appearing.   Cardiovascular:      Rate and Rhythm: Normal rate and regular rhythm.   Pulmonary:      Effort: Pulmonary effort is normal. No respiratory distress.      Breath sounds: Normal breath sounds. No wheezing.   Abdominal:      General: Abdomen is flat. Bowel sounds are normal.      Palpations: Abdomen is soft.      Tenderness: There is no abdominal tenderness.   Musculoskeletal:         General: No swelling or tenderness.      Right lower leg: No edema.      Left lower leg: No edema.   Skin:     General: Skin is warm and dry.   Neurological:      General: No focal deficit present.      Mental Status: She is alert and oriented to person, place, and time. Mental status is at baseline.         Consultants     Consult Orders (all) (From admission, onward)       Start     Ordered    02/10/25 1659  Inpatient Gastroenterology Consult  Once        Specialty:  Gastroenterology  Provider:  Ramsey Baker MD    02/10/25 1659                  Procedures     Imaging Results (All)       Procedure Component Value Units Date/Time    XR Abdomen KUB [[id number]] Collected: 02/11/25 1140     Updated: 02/11/25 1145    Narrative:      XR ABDOMEN KUB-     INDICATIONS: Lower abdominal discomfort     TECHNIQUE: SUPINE VIEWS OF THE ABDOMEN     COMPARISON:  image from CT from 2/10/2025     FINDINGS:      The bowel gas pattern is nonspecific. No supine evidence for free  intraperitoneal gas. A mild volume of stool is apparent in the  colon.  Follow-up/further evaluation can be obtained as indications persist.       Impression:         As described.        This report was finalized on 2/11/2025 11:42 AM by Dr. Nas Maharaj M.D on Workstation: [id number]       CT Abdomen Pelvis Without Contrast [[id number]] Collected: 02/10/25 1629     Updated: 02/10/25 1652    Narrative:      CT ABDOMEN AND PELVIS WITHOUT IV CONTRAST     HISTORY: Acute right-sided abdominal pain, blood in stool     TECHNIQUE: Radiation dose reduction techniques were utilized, including  automated exposure control and exposure modulation based on body size.   3 mm images were obtained through the abdomen and pelvis without IV  contrast.     COMPARISON: CT abdomen pelvis 9/20/2024       Impression:      FINDINGS AND impression:  Please note evaluation is suboptimal without intravenous contrast.  Subcentimeter nodule within the lingula is only partially seen and  corresponds with a 0.7 cm nodule seen since 5/9/2021. Pacemaker wires  only partially seen.     Bibasilar pulmonary opacifications present, right greater than left with  findings of associated volume loss. However, findings are only partially  seen and incompletely evaluated. FINDINGS AT LEAST IN PART ARE  SUGGESTIVE OF ATELECTASIS; HOWEVER, PNEUMONIA CANNOT BE EXCLUDED IN THE  APPROPRIATE CLINICAL CONTEXT. CORRELATION PATIENT HISTORY RECOMMENDED  FOLLOW-UP CHEST CT IF CLINICALLY INDICATED.     No findings of small bowel obstruction. THE APPENDIX IS NOT SEEN AND  CANNOT BE EVALUATED.     Gallbladder surgically absent. The liver, pancreas, spleen and adrenal  glands have an unremarkable noncontrast CT appearance. Subcentimeter  renal lesions are too small to characterize. Larger hypodense lesions  demonstrate density less than 15 Hanse lesions are likely benign per  Bosniak 2019 criteria. No hydronephrosis. However, please note the  bilateral ureters are difficult to follow in their entirety due to lack  of  "intraperitoneal fat.     No definite abdominal pelvic adenopathy by size criteria is seen. Slight  asymmetric soft tissue thickening extending inferiorly from the left  aspect of the anus through the subcutaneous tissues is present, similar  that seen on 9/20/2024. Uterus surgically absent. There is colonic  diverticulosis. No free intraperitoneal air is definitively seen;  however, please note evaluation is suboptimal due to lack of  intraperitoneal fat and intravenous contrast. No suspicious lytic or  blastic osseous lesion. For the purpose of this dictation, last  well-formed disc base referred to as L5-S1. Postsurgical changes from  fusion of L4-5 with left pedicle screws and mojgan are present.           This report was finalized on 2/10/2025 4:49 PM by Dr. Edgar Lopez M.D  on Workstation: [id number]               Pertinent Labs     Results from last 7 days   Lab Units 02/11/25  0533 02/10/25  2029 02/10/25  1239   WBC 10*3/mm3 2.36*  --  3.97   HEMOGLOBIN g/dL 11.2* 11.6* 11.4*   PLATELETS 10*3/mm3 99*  --  101*     Results from last 7 days   Lab Units 02/12/25  0624 02/11/25  0533 02/10/25  1239   SODIUM mmol/L 138 140 135*   POTASSIUM mmol/L 4.6 4.8 4.9   CHLORIDE mmol/L 110* 112* 104   CO2 mmol/L 21.2* 24.0 25.0   BUN mg/dL 14 21 28*   CREATININE mg/dL 0.72 0.79 1.21*   GLUCOSE mg/dL 87 77 90   Estimated Creatinine Clearance: 53.6 mL/min (by C-G formula based on SCr of 0.72 mg/dL).  Results from last 7 days   Lab Units 02/10/25  1239   ALBUMIN g/dL 3.7   BILIRUBIN mg/dL 0.6   ALK PHOS U/L 60   AST (SGOT) U/L 13   ALT (SGPT) U/L <5     Results from last 7 days   Lab Units 02/12/25  0624 02/11/25  0533 02/10/25  1239   CALCIUM mg/dL 9.7 9.5 10.2   ALBUMIN g/dL  --   --  3.7               Invalid input(s): \"LDLCALC\"        Test Results Pending at Discharge       Discharge Details        Discharge Medications        New Medications        Instructions Start Date   hydrocortisone 25 MG suppository  Commonly " known as: ANUSOL-HC   25 mg, Rectal, 2 Times Daily             Changes to Medications        Instructions Start Date   mesalamine 1000 MG suppository  Commonly known as: CANASA  What changed: See the new instructions.   UNWRAP AND INSERT 1 SUPPOSITORY RECTALLY EVERY NIGHT AT BEDTIME      mesalamine 0.375 g 24 hr capsule  Commonly known as: APRISO  What changed: Another medication with the same name was changed. Make sure you understand how and when to take each.   1.5 g, Oral, Daily             Continue These Medications        Instructions Start Date   acetaminophen 500 MG tablet  Commonly known as: TYLENOL   500 mg, Oral, 2 Times Daily      docusate sodium 100 MG capsule  Commonly known as: COLACE   100 mg, Daily      ELDERBERRY PO   2 tablets, Daily      furosemide 40 MG tablet  Commonly known as: LASIX   20 mg, Oral, Daily, Taking 20mg      lidocaine 5 % ointment  Commonly known as: XYLOCAINE   APPLY 1 A THIN LAYER TOPICALLY DAILY AS DIRECTED      Magnesium Oxide -Mg Supplement 400 (240 Mg) MG tablet   400 mg, Oral, Daily      pantoprazole 40 MG EC tablet  Commonly known as: PROTONIX   40 mg, Oral, Daily      spironolactone 25 MG tablet  Commonly known as: ALDACTONE   12.5 mg, Oral, Daily      tacrolimus 0.1 % ointment  Commonly known as: PROTOPIC       valACYclovir 1000 MG tablet  Commonly known as: VALTREX   1,000 mg, Daily      vitamin B-12 1000 MCG tablet  Commonly known as: CYANOCOBALAMIN   1,000 mcg, Oral, Daily               Allergies   Allergen Reactions    Codeine Hallucinations     Tolerates hydromorphone    Amitriptyline Rash    Amoxicillin-Pot Clavulanate Rash    Aspirin Unknown - Low Severity     Patient doesn't know why    Carisoprodol-Aspirin-Codeine Palpitations    Iodinated Contrast Media Rash    Latex Rash    Naproxen Rash    Nsaids Unknown - Low Severity     UNSURE OF REACTION    Soma Compound With Codeine [Carisoprodol-Aspirin-Codeine] Rash    Sulfa Antibiotics Rash    Tramadol Palpitations      heart races          Discharge Disposition:  Home or Self Care    Discharge Diet:  Diet Order   Procedures    Diet: Gastrointestinal; Fiber-Restricted, Low Irritant; Texture: Soft to Chew (NDD 3); Soft to Chew: Whole Meat; Fluid Consistency: Thin (IDDSI 0)       Discharge Activity:   Activity Instructions       Activity as Tolerated              CODE STATUS:    Code Status and Medical Interventions: No CPR (Do Not Attempt to Resuscitate); Limited Support; No intubation (DNI)   Ordered at: 02/10/25 1952     Medical Intervention Limits:    No intubation (DNI)     Code Status (Patient has no pulse and is not breathing):    No CPR (Do Not Attempt to Resuscitate)     Medical Interventions (Patient has pulse or is breathing):    Limited Support       Future Appointments   Date Time Provider Department Center   2/25/2025 11:30 AM Mark Win MD PhD BH [facility]   3/17/2025 11:45 AM Mega Esparza MD [facility] PC EASPT [facility]   3/26/2025  1:15 PM Micaela English MD [facility] GE EA ELLY [facility]   4/14/2025  9:30 AM Sydney Gilliam APRN [facility] CD LCGKR [facility]   4/30/2025 12:20 PM VITALS ONLY [facility]   4/30/2025 12:40 PM Carmelo Coates MD [facility] CBC [facility]   10/13/2025  9:20 AM Mary Grace Culp MD [facility] CD LCGKR [facility]     Additional Instructions for the Follow-ups that You Need to Schedule       Discharge Follow-up with PCP   As directed       Currently Documented PCP:    Mega Esparza MD    PCP Phone Number:    [phone]     Follow Up Details: post-hospital follow up        Discharge Follow-up with Specified Provider: Dr. English as directed   As directed      To: Dr. English as directed               Follow-up Information       Mega Esparza MD .    Specialty: Family Medicine  Why: post-hospital follow up  Contact information:  [address]  [place] [id number] [phone]                             Additional Instructions for the Follow-ups that You Need to Schedule        Discharge Follow-up with PCP   As directed       Currently Documented PCP:    Mega Esparza MD    PCP Phone Number:    [phone]     Follow Up Details: post-hospital follow up        Discharge Follow-up with Specified Provider: Dr. English as directed   As directed      To: Dr. English as directed            Time Spent on Discharge:  I spent greater than 30 minutes on this discharge activity which included: face-to-face encounter with the patient, reviewing the data in the system, coordination of the care with the nursing staff as well as consultants, documentation, and entering orders.       Lavern Goncalves MD  [facility]  02/12/25  14:58 EST

## 2025-02-12 NOTE — PROGRESS NOTES
Gastroenterology   Inpatient Progress Note    Reason for Follow Up: GI bleed    Subjective     Interval History:   Patient denies any further bleeding or abdominal pain.  No complaints at this time.      Current Facility-Administered Medications:     acetaminophen (TYLENOL) tablet 500 mg, 500 mg, Oral, Q6H PRN, Mohan Glez, APRN, 500 mg at 02/11/25 2239    sennosides-docusate (PERICOLACE) 8.6-50 MG per tablet 2 tablet, 2 tablet, Oral, BID PRN **AND** polyethylene glycol (MIRALAX) packet 17 g, 17 g, Oral, Daily PRN **AND** bisacodyl (DULCOLAX) EC tablet 5 mg, 5 mg, Oral, Daily PRN **AND** bisacodyl (DULCOLAX) suppository 10 mg, 10 mg, Rectal, Daily PRN, Rosalind Maher MD    docusate sodium (COLACE) capsule 100 mg, 100 mg, Oral, Daily, Rosalind Maher MD, 100 mg at 02/11/25 0920    furosemide (LASIX) tablet 20 mg, 20 mg, Oral, Daily, Rosalind Maher MD, 20 mg at 02/11/25 0918    hydrocortisone (ANUSOL-HC) suppository 25 mg, 25 mg, Rectal, BID, Beryl Farfan, APRN, 25 mg at 02/11/25 2240    lidocaine (XYLOCAINE) 5 % ointment, , Topical, Daily, Rosalind Maher MD, Given at 02/10/25 2202    melatonin tablet 2.5 mg, 2.5 mg, Oral, Nightly PRN, Rosalind Maher MD    mesalamine (APRISO) 24 hr capsule 1.5 g, 1.5 g, Oral, Daily, Rosalind Maher MD, 1.5 g at 02/11/25 0919    mesalamine (CANASA) suppository 1,000 mg, 1,000 mg, Rectal, Nightly PRN, Rosalind Maher MD    ondansetron ODT (ZOFRAN-ODT) disintegrating tablet 4 mg, 4 mg, Oral, Q6H PRN **OR** ondansetron (ZOFRAN) injection 4 mg, 4 mg, Intravenous, Q6H PRN, Rosalind Maher MD    pantoprazole (PROTONIX) EC tablet 40 mg, 40 mg, Oral, Daily, Rosalind Maher MD, 40 mg at 02/11/25 0920    spironolactone (ALDACTONE) half tablet 12.5 mg, 12.5 mg, Oral, Daily, Rosalind Maher MD, 12.5 mg at 02/11/25 0919    valACYclovir (VALTREX) tablet 1,000 mg, 1,000 mg, Oral, Daily, Rosalind Maher MD, 1,000  mg at 02/11/25 0920    vitamin B-12 (CYANOCOBALAMIN) tablet 1,000 mcg, 1,000 mcg, Oral, Daily, Rosalind Maher MD, 1,000 mcg at 02/11/25 0918      Review of Systems   Constitutional: Negative.    HENT: Negative.     Eyes: Negative.    Respiratory: Negative.     Cardiovascular: Negative.    Gastrointestinal: Negative.  Negative for abdominal pain and blood in stool.   Endocrine: Negative.    Genitourinary: Negative.    Musculoskeletal: Negative.    Skin: Negative.    Allergic/Immunologic: Negative.    Neurological: Negative.    Hematological: Negative.    Psychiatric/Behavioral: Negative.           Objective     Vital Signs  Temp:  [97.9 °F (36.6 °C)-98.2 °F (36.8 °C)] 97.9 °F (36.6 °C)  Heart Rate:  [60-62] 60  Resp:  [16] 16  BP: ()/(47-64) 107/62  Body mass index is 27.49 kg/m².    Intake/Output Summary (Last 24 hours) at 2/12/2025 0724  Last data filed at 2/12/2025 0530  Gross per 24 hour   Intake --   Output 3600 ml   Net -3600 ml     No intake/output data recorded.       Physical Exam  Vitals reviewed.   Constitutional:       Appearance: Normal appearance.   HENT:      Head: Normocephalic.      Nose: Nose normal.   Eyes:      Pupils: Pupils are equal, round, and reactive to light.   Cardiovascular:      Rate and Rhythm: Normal rate.      Pulses: Normal pulses.   Pulmonary:      Effort: Pulmonary effort is normal.      Breath sounds: Normal breath sounds.   Abdominal:      General: Abdomen is flat. There is no distension.      Palpations: Abdomen is soft.      Tenderness: There is no abdominal tenderness.   Musculoskeletal:         General: Normal range of motion.      Cervical back: Normal range of motion.   Skin:     General: Skin is warm and dry.   Neurological:      General: No focal deficit present.      Mental Status: She is alert and oriented to person, place, and time.   Psychiatric:         Mood and Affect: Mood normal.            Results Review      Results from last 7 days   Lab Units  02/11/25  0533 02/10/25  2029 02/10/25  1239   WBC 10*3/mm3 2.36*  --  3.97   HEMOGLOBIN g/dL 11.2* 11.6* 11.4*   HEMATOCRIT % 32.3* 35.7 36.1   PLATELETS 10*3/mm3 99*  --  101*     Results from last 7 days   Lab Units 02/12/25  0624 02/11/25  0533 02/10/25  1239   SODIUM mmol/L 138 140 135*   POTASSIUM mmol/L 4.6 4.8 4.9   CHLORIDE mmol/L 110* 112* 104   CO2 mmol/L 21.2* 24.0 25.0   BUN mg/dL 14 21 28*   CREATININE mg/dL 0.72 0.79 1.21*   CALCIUM mg/dL 9.7 9.5 10.2   BILIRUBIN mg/dL  --   --  0.6   ALK PHOS U/L  --   --  60   ALT (SGPT) U/L  --   --  <5   AST (SGOT) U/L  --   --  13   GLUCOSE mg/dL 87 77 90     Results from last 7 days   Lab Units 02/10/25  1239   INR  1.24*     Lab Results   Lab Value Date/Time    LIPASE 21 09/20/2024 1224    LIPASE 20 08/21/2023 1608    LIPASE 19 11/01/2022 1810    LIPASE 14 05/09/2021 1109    LIPASE 19 05/04/2019 1239       Radiology:  XR Abdomen KUB   Final Result       As described.           This report was finalized on 2/11/2025 11:42 AM by Dr. Nas Maharaj M.D on Workstation: [id number]          CT Abdomen Pelvis Without Contrast   Final Result   FINDINGS AND impression:   Please note evaluation is suboptimal without intravenous contrast.   Subcentimeter nodule within the lingula is only partially seen and   corresponds with a 0.7 cm nodule seen since 5/9/2021. Pacemaker wires   only partially seen.       Bibasilar pulmonary opacifications present, right greater than left with   findings of associated volume loss. However, findings are only partially   seen and incompletely evaluated. FINDINGS AT LEAST IN PART ARE   SUGGESTIVE OF ATELECTASIS; HOWEVER, PNEUMONIA CANNOT BE EXCLUDED IN THE   APPROPRIATE CLINICAL CONTEXT. CORRELATION PATIENT HISTORY RECOMMENDED   FOLLOW-UP CHEST CT IF CLINICALLY INDICATED.       No findings of small bowel obstruction. THE APPENDIX IS NOT SEEN AND   CANNOT BE EVALUATED.       Gallbladder surgically absent. The liver, pancreas, spleen and  adrenal   glands have an unremarkable noncontrast CT appearance. Subcentimeter   renal lesions are too small to characterize. Larger hypodense lesions   demonstrate density less than 15 Hanse lesions are likely benign per   Bosniak 2019 criteria. No hydronephrosis. However, please note the   bilateral ureters are difficult to follow in their entirety due to lack   of intraperitoneal fat.       No definite abdominal pelvic adenopathy by size criteria is seen. Slight   asymmetric soft tissue thickening extending inferiorly from the left   aspect of the anus through the subcutaneous tissues is present, similar   that seen on 9/20/2024. Uterus surgically absent. There is colonic   diverticulosis. No free intraperitoneal air is definitively seen;   however, please note evaluation is suboptimal due to lack of   intraperitoneal fat and intravenous contrast. No suspicious lytic or   blastic osseous lesion. For the purpose of this dictation, last   well-formed disc base referred to as L5-S1. Postsurgical changes from   fusion of L4-5 with left pedicle screws and mojgan are present.               This report was finalized on 2/10/2025 4:49 PM by Dr. Edgar Lopez M.D   on Workstation: [id number]                Assessment & Plan     Active Hospital Problems    Diagnosis     **Acute lower GI bleeding     Chronic heart failure with preserved ejection fraction     COPD (chronic obstructive pulmonary disease)     Paroxysmal atrial fibrillation     History of atrial fibrillation     MGUS (monoclonal gammopathy of unknown significance)     HUGO (obstructive sleep apnea)     Pulmonary hypertension     Essential hypertension        Assessment:  Rectal bleeding -resolved  Bilateral lower abdominal discomfort -resolved  Abnormal CT - Slight asymmetric soft tissue thickening extending inferiorly from the left aspect of the anus through the subcutaneous tissues is present, similar that seen on 9/20/2024  Mild anemia -stable.  History of  constipation  Proctosigmoid ulcerative colitis  S/p cholecystectomy  COPD, pulmonary hypertension  CKD stage III  CHF, A-fib -no OAC      Results Reviewed:  XR Abdomen KUB (02/11/2025 11:08) mild volume of stool in colon  CT Abdomen Pelvis Without Contrast (02/10/2025 15:27)  Slight asymmetric soft tissue thickening extending inferiorly from the left aspect of the anus through the subcutaneous tissues is present, similar that seen on 9/20/2024, atelectasis vs. PNA, diverticulosis     FLEXIBLE SIGMOIDOSCOPY (11/02/2022 10:53) proctosigmoid ulcerative colitis with mild inflammation, diverticulosis      Plan:  Patient's family member wonders if treatment is needed for UTI, will defer to primary team.  Continue Apriso 1.5 g daily  Continue hydrocortisone suppositories twice daily for 1 week  Trend Hgb, monitor for further bleeding  Advance diet as tolerated  Regarding abnormal CT -appears unchanged compared to last CT 9/2024, hold off on surgical evaluation unless patient develops perianal pain or discharge.  GI symptoms resolved.  We will sign off and arrange outpatient follow-up with Dr. English.  Call with questions.      I discussed the patients findings and my recommendations with patient and family.             SHAHID Martines  [facility] [address] [address]  Office: [phone]

## 2025-02-12 NOTE — OUTREACH NOTE
Prep Survey      Flowsheet Row Responses   [facility] facility patient discharged from? [place]   Is LACE score < 7 ? No   Eligibility [facility]   Date of Admission 02/10/25   Date of Discharge 02/12/25   Discharge Disposition Home or Self Care   Discharge diagnosis Acute lower GI bleeding   Does the patient have one of the following disease processes/diagnoses(primary or secondary)? Other   Does the patient have Home health ordered? No   Is there a DME ordered? No   Prep survey completed? Yes            BRIAN A - Registered Nurse

## 2025-02-13 ENCOUNTER — TRANSITIONAL CARE MANAGEMENT TELEPHONE ENCOUNTER (OUTPATIENT)
Dept: CALL CENTER | Facility: HOSPITAL | Age: OVER 89
End: 2025-02-13
Payer: MEDICARE

## 2025-02-13 RX ORDER — MESALAMINE 1000 MG/1
1000 SUPPOSITORY RECTAL NIGHTLY PRN
Qty: 30 EACH | Refills: 1 | Status: SHIPPED | OUTPATIENT
Start: 2025-02-13

## 2025-02-13 NOTE — OUTREACH NOTE
Call Center TCM Note      Flowsheet Row Responses   [facility] patient discharged from? [place]   Does the patient have one of the following disease processes/diagnoses(primary or secondary)? Other   TCM attempt successful? Yes  [dtrs Amina and Claudia]   Call start time 0839   Call end time 0841   Discharge diagnosis Acute lower GI bleeding   Person spoke with today (if not patient) and relationship Daughter- Amina   Is the patient having any side effects they believe may be caused by any medication additions or changes? No   Does the patient have all medications ordered at discharge? Yes   Is the patient taking all medications as directed (includes completed medication regime)? Yes   Comments Hospital f/u 2/20/25@0930am   Does the patient have an appointment with their PCP within 7-14 days of discharge? Yes   Has home health visited the patient within 72 hours of discharge? N/A   Psychosocial issues? No   Did the patient receive a copy of their discharge instructions? Yes   Nursing interventions Reviewed instructions with patient   What is the patient's perception of their health status since discharge? Improving  [dtr reports that pt is doing well, tolerated intake last night without issues.  Aware to monitor for any s/s bleeding.  Dtr has no questions today.]   Is the patient/caregiver able to teach back signs and symptoms related to disease process for when to call PCP? Yes   TCM call completed? Yes   Call end time 0841            Daria Griffith RN    2/13/2025, 08:46 EST

## 2025-02-25 ENCOUNTER — OFFICE VISIT (OUTPATIENT)
Dept: GASTROENTEROLOGY | Facility: CLINIC | Age: OVER 89
End: 2025-02-25
Payer: MEDICARE

## 2025-02-25 ENCOUNTER — HOSPITAL ENCOUNTER (OUTPATIENT)
Dept: CARDIOLOGY | Facility: HOSPITAL | Age: OVER 89
Discharge: HOME | End: 2025-02-25
Payer: MEDICARE

## 2025-02-25 ENCOUNTER — LAB (OUTPATIENT)
Dept: LAB | Facility: HOSPITAL | Age: OVER 89
End: 2025-02-25
Payer: MEDICARE

## 2025-02-25 VITALS
WEIGHT: 171.6 LBS | BODY MASS INDEX: 29.3 KG/M2 | HEIGHT: 64 IN | HEART RATE: 60 BPM | TEMPERATURE: 98.5 F | DIASTOLIC BLOOD PRESSURE: 66 MMHG | SYSTOLIC BLOOD PRESSURE: 120 MMHG

## 2025-02-25 VITALS
WEIGHT: 172 LBS | HEIGHT: 64 IN | DIASTOLIC BLOOD PRESSURE: 72 MMHG | HEART RATE: 60 BPM | SYSTOLIC BLOOD PRESSURE: 122 MMHG | OXYGEN SATURATION: 98 % | BODY MASS INDEX: 29.37 KG/M2

## 2025-02-25 DIAGNOSIS — K51.30 ULCERATIVE RECTOSIGMOIDITIS WITHOUT COMPLICATION: Primary | ICD-10-CM

## 2025-02-25 DIAGNOSIS — I50.32 CHRONIC HEART FAILURE WITH PRESERVED EJECTION FRACTION: Primary | ICD-10-CM

## 2025-02-25 DIAGNOSIS — I50.32 CHRONIC HEART FAILURE WITH PRESERVED EJECTION FRACTION: ICD-10-CM

## 2025-02-25 DIAGNOSIS — I51.9 DYSFUNCTION OF RIGHT CARDIAC VENTRICLE: ICD-10-CM

## 2025-02-25 DIAGNOSIS — I27.20 PULMONARY HYPERTENSION: Primary | ICD-10-CM

## 2025-02-25 DIAGNOSIS — I27.20 PULMONARY HYPERTENSION: ICD-10-CM

## 2025-02-25 LAB
ANION GAP SERPL CALCULATED.3IONS-SCNC: 7.6 MMOL/L (ref 5–15)
BUN SERPL-MCNC: 21 MG/DL (ref 8–23)
BUN/CREAT SERPL: 16.3 (ref 7–25)
CALCIUM SPEC-SCNC: 10.7 MG/DL (ref 8.6–10.5)
CHLORIDE SERPL-SCNC: 105 MMOL/L (ref 98–107)
CO2 SERPL-SCNC: 26.4 MMOL/L (ref 22–29)
CREAT SERPL-MCNC: 1.29 MG/DL (ref 0.57–1)
CRP SERPL-MCNC: 0.59 MG/DL (ref 0–0.5)
EGFRCR SERPLBLD CKD-EPI 2021: 39.8 ML/MIN/1.73
ERYTHROCYTE [DISTWIDTH] IN BLOOD BY AUTOMATED COUNT: 12.7 % (ref 12.3–15.4)
ERYTHROCYTE [SEDIMENTATION RATE] IN BLOOD BY WESTERGREN METHOD: 37 MM/HR (ref 0–30)
FERRITIN SERPL-MCNC: 122 NG/ML (ref 13–150)
GLUCOSE SERPL-MCNC: 104 MG/DL (ref 65–99)
HCT VFR BLD AUTO: 36 % (ref 34–46.6)
HGB BLD-MCNC: 12.6 G/DL (ref 12–15.9)
IRON 24H UR-MRATE: 106 MCG/DL (ref 37–145)
IRON SATN MFR SERPL: 31 % (ref 20–50)
MCH RBC QN AUTO: 36.3 PG (ref 26.6–33)
MCHC RBC AUTO-ENTMCNC: 35 G/DL (ref 31.5–35.7)
MCV RBC AUTO: 103.7 FL (ref 79–97)
PLATELET # BLD AUTO: 132 10*3/MM3 (ref 140–450)
POTASSIUM SERPL-SCNC: 4.9 MMOL/L (ref 3.5–5.2)
RBC # BLD AUTO: 3.47 10*6/MM3 (ref 3.77–5.28)
SODIUM SERPL-SCNC: 139 MMOL/L (ref 136–145)
TIBC SERPL-MCNC: 338 MCG/DL (ref 298–536)
TRANSFERRIN SERPL-MCNC: 227 MG/DL (ref 200–360)
WBC # BLD AUTO: 3.33 10*3/MM3 (ref 3.4–10.8)

## 2025-02-25 PROCEDURE — 36415 COLL VENOUS BLD VENIPUNCTURE: CPT

## 2025-02-25 PROCEDURE — 80048 BASIC METABOLIC PNL TOTAL CA: CPT

## 2025-02-25 PROCEDURE — 83540 ASSAY OF IRON: CPT

## 2025-02-25 PROCEDURE — 82728 ASSAY OF FERRITIN: CPT

## 2025-02-25 PROCEDURE — 84466 ASSAY OF TRANSFERRIN: CPT

## 2025-02-25 PROCEDURE — 1159F MED LIST DOCD IN RCRD: CPT

## 2025-02-25 PROCEDURE — 99214 OFFICE O/P EST MOD 30 MIN: CPT

## 2025-02-25 PROCEDURE — 1160F RVW MEDS BY RX/DR IN RCRD: CPT

## 2025-02-25 PROCEDURE — G0463 HOSPITAL OUTPT CLINIC VISIT: HCPCS

## 2025-02-25 RX ORDER — SPIRONOLACTONE 25 MG/1
25 TABLET ORAL EVERY OTHER DAY
Qty: 45 TABLET | Refills: 3 | Status: SHIPPED | OUTPATIENT
Start: 2025-02-25

## 2025-02-25 RX ORDER — MESALAMINE 1000 MG/1
1000 SUPPOSITORY RECTAL NIGHTLY PRN
Qty: 30 EACH | Refills: 2 | Status: SHIPPED | OUTPATIENT
Start: 2025-02-25

## 2025-02-25 RX ORDER — LIDOCAINE 50 MG/G
OINTMENT TOPICAL DAILY PRN
Qty: 35.44 G | Refills: 5 | Status: SHIPPED | OUTPATIENT
Start: 2025-02-25

## 2025-02-25 NOTE — PROGRESS NOTES
"Chief Complaint  Abdominal Pain and Rectal Bleeding    Subjective          History of Present Illness    Brittany Villeda is a  89 y.o. female presents for follow-up after recently being seen in the emergency department.  She has a history of ulcerative colitis, status postcholecystectomy, COPD, pulmonary hypertension, CKD stage III, CHF and A-fib who presented to the ED 2/10/2025 for rectal bleeding and abdominal pain.  She continued on home mesalamine and started hydrocortisone suppositories and rectal bleeding resolved.    She reports that since being discharged from the emergency department she has been using a mesalamine suppository daily and has had no further bleeding.  She is also not had any abdominal pain.  She has a good appetite and tolerates diet well.  She denies any unintentional weight loss.  She is having regular formed bowel movements.  She continues to take her Apriso daily.  She reports that they have discussed doing a flexible sigmoidoscopy while she was in the hospital but ultimately decided against it due to her history of COPD.    She is a patient of Dr. English and was last seen in the office 6/3/2024.    She had issues with rectal bleeding in the fall 2022. She underwent a flexible sigmoidoscopy at that time which did show some mild ulcerative proctosigmoiditis and she was treated with prednisone. She is managed chronically with Apriso 1.5 g daily.     CT abdomen pelvis without contrast showed slight asymmetric soft tissue thickening extending inferiorly from the left aspect of the anus through the subcutaneous tissues similar to scan from 9/20/2024.    Objective   Vital Signs:   /66   Pulse 60   Temp 98.5 °F (36.9 °C)   Ht 162.6 cm (64\")   Wt 77.8 kg (171 lb 9.6 oz)   BMI 29.46 kg/m²       Physical Exam  Constitutional:       General: She is not in acute distress.     Appearance: Normal appearance.   Eyes:      General: No scleral icterus.  Pulmonary:      Effort: Pulmonary " effort is normal.   Abdominal:      General: Abdomen is flat. There is no distension.      Tenderness: There is no abdominal tenderness. There is no guarding.   Skin:     Coloration: Skin is not jaundiced.   Neurological:      General: No focal deficit present.      Mental Status: She is alert and oriented to person, place, and time.   Psychiatric:         Mood and Affect: Mood normal.         Behavior: Behavior normal.          Result Review :   The following data was reviewed by: Ana Cristina Zafar PA-C on 02/25/2025:  CMP          2/11/2025    05:33 2/12/2025    06:24 2/25/2025    10:08   CMP   Glucose 77  87  104    BUN 21  14  21    Creatinine 0.79  0.72  1.29    EGFR 71.6  80.0  39.8    Sodium 140  138  139    Potassium 4.8  4.6  4.9    Chloride 112  110  105    Calcium 9.5  9.7  10.7    BUN/Creatinine Ratio 26.6  19.4  16.3    Anion Gap 4.0  6.8  7.6      CBC          1/20/2025    05:44 2/10/2025    12:39 2/10/2025    20:29 2/11/2025    05:33   CBC   WBC 2.96  3.97   2.36    RBC 3.63  3.31   3.10    Hemoglobin 13.3  11.4  11.6  11.2    Hematocrit 38.5  36.1  35.7  32.3    .1  109.1   104.2    MCH 36.6  34.4   36.1    MCHC 34.5  31.6   34.7    RDW 11.5  11.8   11.7    Platelets 112  101   99              Assessment:   Diagnoses and all orders for this visit:    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  Dispense: 35.44 g; Refill: 5  -     Sedimentation Rate  -     C-reactive Protein  -     Calprotectin, Fecal - Stool, Per Rectum  -     CBC (No Diff)  -     mesalamine (CANASA) 1000 MG suppository; Insert 1 suppository into the rectum At Night As Needed (pain/bleeding).  Dispense: 30 each; Refill: 2          Plan:   -Okay to continue Canasa suppositories.  Continue oral mesalamine as well.  She has not had any further bleeding.  She is  hopeful to avoid procedures if at all possible but is open to a flex sig if bleeding returns.  Will recheck CBC and some inflammatory markers.  -Okay to use lidocaine ointment for discomfort that she has in her perineum.       Follow Up   No follow-ups on file.    Dragon dictation used throughout this note.         Ana Cristina Zafar PA-C  [facility]  [address] [place] [address]  Office: [phone]

## 2025-02-25 NOTE — LETTER
March 5, 2025     Mary Grace Culp MD  [address] [address]    Patient: Brittany Villeda   YOB: 1935   Date of Visit: 2/25/2025     Dear Mary Grace Culp MD:       Thank you for referring Brittany Villeda to me for evaluation. Below are the relevant portions of my assessment and plan of care.    If you have questions, please do not hesitate to call me. I look forward to following Brittany along with you.         Sincerely,        SHAHID Bazan        CC: MD Obed Calvert, SHAHID Holly  03/05/25 2226  [facility]    Franklin Galvin MD  [address] [id number]    Thank you for asking me to see Brittany Villeda.     Congestive Heart Failure  Pertinent negatives include no chest pain or shortness of breath.       1.  Pulmonary hypertension  2.  HFpEF    Subjective     Brittany Cliftons a 89 y.o. female who presents today for pulmonary hypertension, HFpEF.   The patient's most recent 2D TTE showed evidence of pulmonary hypertension and right-sided involvement.  She does have biatrial dilatation.  She was diagnosed with HUGO. She does not use a CPAP. She was diagnosed with complete heart block in the past and required placement of a PPM--Status post [employer] dual-chamber pacemaker in 2018 .  She also was diagnosed with COPD.  She does not routinely follow with pulmonary medicine.  She also was diagnosed with MGUS.      Since last visit patient had admission in December 20, 2024 with COVID-19.  She was treated with Paxlovid and was able to be discharged.    Patient had hospitalization 1/18/2025 to 1/20/2025.  Acute decompensated Heart falure Hospitalization.  She received IV Lasix for diuresis with improvement.  She was discharged on home medications-Lasix 20 mg daily, spironolactone 12.5 mg daily. Feels like this was due to not having medicaitons.     Stable exercise  intolerance.  Denies any abdominal distention.    She does report exertional dyspnea/shortness of breath.   Denies any PND.         Review of Systems - Review of Systems   Constitutional: Negative for weight gain and weight loss.   Cardiovascular:  Negative for chest pain, dyspnea on exertion, leg swelling, orthopnea, paroxysmal nocturnal dyspnea and syncope.   Respiratory:  Negative for cough, shortness of breath, sleep disturbances due to breathing and snoring.    Gastrointestinal:  Negative for bloating.   Neurological:  Negative for dizziness, light-headedness and weakness.          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-sided sciatica   • Essential hypertension   • Gastroesophageal reflux disease without esophagitis   • Cataract   • Pulmonary hypertension   • Diastolic dysfunction   • HUGO (obstructive sleep apnea)   • Trifascicular block   • MGUS (monoclonal gammopathy of unknown significance)   • Ulcerative rectosigmoiditis without complication   • Lichen sclerosus   • Heart block   • Sleep-related hypoxia   • Bradycardia   • History of atrial fibrillation   • Pacemaker   • Paroxysmal SVT (supraventricular tachycardia)   • History of chest pain   • Palpitations   • Paroxysmal atrial fibrillation   • Ascending aortic aneurysm   • Mediastinal lymphadenopathy   • Anemia   • Obesity (BMI 30-39.9)   • Generalized weakness   • Dysautonomia   • Hypercalcemia   • Hyperparathyroidism   • Choledocholithiasis   • Duodenal ulcer   • Hemorrhagic gastritis   • Exertional dyspnea   • COPD (chronic obstructive pulmonary disease)   • Osteoarthritis of glenohumeral joint   • ICH (intracerebral hemorrhage)   • Lower GI bleed   • Thrombocytopenia   • Lichen sclerosus of female genitalia   • Senile atrophic vaginitis   • Left inguinal hernia   • Vulvar pain   • Bilateral lower extremity edema   • Chronic heart failure with preserved ejection fraction    • Acute renal insufficiency   • Dysfunction of right cardiac ventricle   • COVID-19 virus infection   • Shortness of breath   • Acute lower GI bleeding     family history includes Breast cancer in her sister; Diabetes in her mother; Heart disease in her sister; Kidney cancer in her sister; Prostate cancer in her brother, brother, and brother.   reports that she quit smoking about 60 years ago. Her smoking use included cigarettes. She started smoking about 72 years ago. She has a 18 pack-year smoking history. She has been exposed to tobacco smoke. She has never used smokeless tobacco. She reports that she does not drink alcohol and does not use drugs.  Allergies   Allergen Reactions   • Codeine Hallucinations     Tolerates hydromorphone   • Amitriptyline Rash   • Amoxicillin-Pot Clavulanate Rash   • Aspirin Unknown - Low Severity     Patient doesn't know why   • Carisoprodol-Aspirin-Codeine Palpitations   • Iodinated Contrast Media Rash   • Latex Rash   • Naproxen Rash   • Nsaids Unknown - Low Severity     UNSURE OF REACTION   • Soma Compound With Codeine [Carisoprodol-Aspirin-Codeine] Rash   • Sulfa Antibiotics Rash   • Tramadol Palpitations     heart races      Physical Activity: Sufficiently Active (4/26/2024)    Exercise Vital Sign    • Days of Exercise per Week: 5 days    • Minutes of Exercise per Session: 30 min          Current Outpatient Medications:   •  acetaminophen (TYLENOL) 500 MG tablet, Take 1 tablet by mouth 2 (Two) Times a Day., Disp: , Rfl:   •  docusate sodium (COLACE) 100 MG capsule, Take 1 capsule by mouth Daily., Disp: , Rfl:   •  ELDERBERRY PO, Take 2 tablets by mouth Daily., Disp: , Rfl:   •  furosemide (LASIX) 40 MG tablet, Take 0.5 tablets by mouth Daily. Taking 20mg, Disp: 90 tablet, Rfl: 1  •  lidocaine (XYLOCAINE) 5 % ointment, Apply  topically to the appropriate area as directed Daily As Needed for Mild Pain (for rectal discomfort)., Disp: 35.44 g, Rfl: 5  •  Magnesium Oxide -Mg  Supplement 400 (240 Mg) MG tablet, TAKE 1 TABLET BY MOUTH DAILY, Disp: 90 tablet, Rfl: 1  •  mesalamine (APRISO) 0.375 g 24 hr capsule, TAKE FOUR CAPSULES BY MOUTH DAILY, Disp: 360 capsule, Rfl: 2  •  pantoprazole (PROTONIX) 40 MG EC tablet, TAKE 1 TABLET BY MOUTH DAILY, Disp: 90 tablet, Rfl: 1  •  spironolactone (ALDACTONE) 25 MG tablet, Take 1 tablet by mouth Every Other Day., Disp: 45 tablet, Rfl: 3  •  tacrolimus (PROTOPIC) 0.1 % ointment, , Disp: , Rfl:   •  valACYclovir (VALTREX) 1000 MG tablet, Take 1 tablet by mouth Daily., Disp: , Rfl:   •  vitamin B-12 (CYANOCOBALAMIN) 1000 MCG tablet, TAKE 1 TABLET BY MOUTH DAILY, Disp: 90 tablet, Rfl: 1  •  mesalamine (CANASA) 1000 MG suppository, Insert 1 suppository into the rectum At Night As Needed (pain/bleeding)., Disp: 30 each, Rfl: 2    Objective  Vital Sign Review:   Vitals:    02/25/25 1029   BP: 122/72   Pulse: 60   SpO2: 98%     Body mass index is 29.51 kg/m².      02/25/25  1029   Weight: 78 kg (172 lb)       Physical Exam:  Constitutional:       Appearance: Normal and healthy appearance.   Neck:      Vascular: No carotid bruit or JVD. JVD normal.   Pulmonary:      Effort: Pulmonary effort is normal.      Breath sounds: Normal breath sounds.   Cardiovascular:      PMI at left midclavicular line. Normal rate. Regular rhythm.   Pulses:     Intact distal pulses.   Edema:     Peripheral edema absent.   Abdominal:      Palpations: Abdomen is soft.      Tenderness: There is no abdominal tenderness.   Skin:     General: Skin is warm and dry.   Neurological:      General: No focal deficit present.      Mental Status: Alert and oriented to person, place and time.   Psychiatric:         Behavior: Behavior is cooperative.          DATA REVIEWED:   EKG:  ECG 12 Lead     Date/Time: 8/12/2024 1:25 PM  Performed by: Sydney Gilliam APRN     Authorized by: Sydney Gilliam APRN  Comparison: compared with previous ECG   Similar to previous ECG  Rhythm: atrial  fibrillation and paced  Rate: normal  BPM: 67  Conduction: right bundle branch block  QRS axis: normal  Comments: Similar to prior    ---------------------------------------------------  ECHO:    Results for orders placed during the hospital encounter of 04/23/24    Adult Transthoracic Echo Complete W/ Cont if Necessary Per Protocol    Interpretation Summary  •  Left ventricular systolic function is normal. Left ventricular ejection fraction appears to be 61 - 65%.  •  Left ventricular diastolic function was indeterminate.  •  The right ventricular cavity is mildly dilated. Normal right ventricular systolic function noted.  •  The left atrial cavity is severely dilated.  •  The right atrial cavity is severely dilated.  •  Saline test results are negative.  •  Mild aortic valve regurgitation is present.  •  Mild mitral valve regurgitation is present.  •  Mild to moderate tricuspid valve regurgitation is present.  •  Calculated right ventricular systolic pressure from tricuspid regurgitation is 38 mmHg.  •  There is no evidence of pericardial effusion.          -----------------------------------------------------  RHC/LHC    No results found for this or any previous visit.      ---------------------------------------------------------------------------    CT:   CTA chest  FINDINGS:  1. There is no convincing evidence for pulmonary thromboemboli. Again  seen is heterogeneous admixture of contrast of the pulmonary arteries at  the right lower lobe, but no pulmonary thromboemboli are seen. Pulmonary  arteries are enlarged with the main pulmonary artery measuring 3.8 cm in  diameter, pulmonary arterial hypertension.     The thoracic aorta is ectatic at 4.0 cm, stable when measured along the  same planes. The diameter tapers to 3 cm at the aortic arch.     2. Stable marked elevation of the right hemidiaphragm with significant  compressive atelectasis at the right lower lobe. No new airspace  consolidations are seen  suspicious for pneumonia. There are no pleural  or pericardial effusions. There is cardiomegaly and findings of  right-sided cardiac insufficiency, unchanged.     3. Enlarged mediastinal nodes are stable and extensive multinodular  goiter appears stable, although not seen in its entirety. No new  abnormality seen at the visualized upper abdomen, multiple renal cysts  are noted.      --------------------------------------------------------------------------------------------------------------    Laboratory evaluations:  Renal Function: Estimated Creatinine Clearance: 29.9 mL/min (A) (by C-G formula based on SCr of 1.29 mg/dL (H)).    Lab Results   Component Value Date    GLUCOSE 104 (H) 02/25/2025    BUN 21 02/25/2025    CREATININE 1.29 (H) 02/25/2025    EGFRIFNONA 77 07/29/2021    EGFRIFAFRI 115 01/24/2022    BCR 16.3 02/25/2025    K 4.9 02/25/2025    CO2 26.4 02/25/2025    CALCIUM 10.7 (H) 02/25/2025    ALBUMIN 3.7 02/10/2025    AST 13 02/10/2025    ALT <5 02/10/2025     Lab Results   Component Value Date    WBC 3.33 (L) 02/25/2025    HGB 12.6 02/25/2025    HCT 36.0 02/25/2025    .7 (H) 02/25/2025     (L) 02/25/2025     Lab Results   Component Value Date    HGBA1C 5.4 04/25/2022     Lab Results   Component Value Date    HGBA1C 5.4 04/25/2022    HGBA1C 4.90 06/23/2021    HGBA1C 5.30 05/11/2021     Lab Results   Component Value Date    CREATININE 1.29 (H) 02/25/2025     Lab Results   Component Value Date    IRON 106 02/25/2025    TIBC 338 02/25/2025    FERRITIN 122.00 02/25/2025       Result Review:  I have personally reviewed the results from the time of this admission to 3/5/2025 22:23 EST and agree with these findings:  [x]  Laboratory list / accordion  []  Microbiology  [x]  Radiology  [x]  EKG/Telemetry   [x]  Cardiology/Vascular   []  Pathology  [x]  Old records  []  Other:  Most notable findings include:   Pacemaker interrogation-1/19/2025-Presenting: AF/ @ 60 bpm  Battery: 5 MONTHS!- WATCH FOR  RRT!  Sensing, impedance, and thresholds reviewed  Events: No new events; AF known to pt; AC d/c due to hx of GI  bleed and intracranial hemorrhage.  Heart Failure: NA  Heart rate histograms reviewed  Pacing and detection parameters reviewed  Planned follow-up: Remote monitoring only per Chart Notes  Remote monitoring: Pt is connected in Lat[place].        ReDS VOLUMETRIC ASSESSMENT:  UTO      Assessment & Plan     1. Pulmonary hypertension    2. Chronic heart failure with preserved ejection fraction    3. Dysfunction of right cardiac ventricle      Pulmonary hypertension-she has HFpEF and HUGO. we will focus on GDMT for heart failure.  No current indications today for the initiation of PAH-specific medications.  However, if she has any further interval negative remodeling of her RV. May consider VQ scan in the future to exclude CTEPH.  PFTs showed Mild restrictive lung disease with a moderate diffusion deficit.     HFpEF.  Today, euvolemic.  She did have recent heart failure exacerbation and was admitted.  Diuretics were continued at discharge at normal dose.  Patient's family feels that this exacerbation was related to patient not getting her medications as her daughter/primary caregiver was sick and not managing her medicines at that time.    BMP done, she has otherwise been stable on medications, we will continue medications and-Creatinine is a little elevated, but creatinine has varied over the past year from 0.7-1.32. Also patient was fasting this morning, we will plan repeat in 2 weeks.  3. Right ventricular dysfunction--would like to keep patient on spironolactone --she was on 12.5mg daily, but cutting the tablet in half has become too difficult, she will start taking a whole tablet(25mg) every other day.           NYHA stage C FC-III     Clinical status was assessed and has remained stable.  Treatment intent: curative   ReDS reading was not obtained today.  ReDS result: UTO       Today, Patient appears  euvolemic. and with perfusion. The patient's hemodynamics are currently acceptable. HR is: normal and is at goal. BP/MAP was reviewed and there is not room for medication up-titration.  The patient's clinical course has been impacted by Arrythmia-persistent afib, untreated HUGO. LVEF: 61-65%.     GDMT Assessment:     GDMT changes recommended today: No changes to medication therapy.      BB:   Rate controlled without BB--possible future addition as patient has pacemaker if needed  Monitor heart rate.  Please call the [facility] for HR<50, dizziness, lightheadedness, syncope     A/A/A:   Hemodynamics have not previously allow--recent issues with hyperkalemia.  Patient was taking extra potassium supplement     FXH6C-M:  Not a candidate due to having severe vaginal yeast infections.       MRA:  The patient is FC-NYHA Class III and MRA is indicated.   continue Spironolactone to 25mg every other day  BMP today          Diuretic Regimen:  -DIURETIC:   furosemide (LASIX) 20 mg every day  -Fluid restriction:   -requested  -2000 ml  -Patient has been asked to weigh daily and was provided with a printed diuretic strategy.  -Sodium restriction:   -1,500 mg Na restriction was discussed.      Labs/Diagnostics/Referrals:    Labs -Chem-7       Lifestyle Advice:   - call office if I gain more than 2 pounds in one day or 5 pounds in one week   - keep legs up while sitting   - use salt in moderation   - watch for swelling in feet, ankles and legs every day   - weigh myself daily   -call for concerning s/sx   -- activity or exercise based on tolerance encouraged     CODE STATUS: FULL              Return in about 3 months (around 5/25/2025).  F/u as scheduled            Thank you for allowing me to participate in the care of your patient,         Brooke Clay SHAHID  [facility]  03/05/25  09:12 EDT      **Deon Disclaimer:**  Much of this encounter note is an electronic transcription/translation of spoken language to  printed text. The electronic translation of spoken language may permit erroneous, or at times, nonsensical words or phrases to be inadvertently transcribed. Although I have reviewed the note for such errors, some may still exist.    The information in this note was reviewed and updated during the visit to be as accurate as possible. As many patients have chronic medical problems, many of their individual problems and ongoing plans do not change significantly from visit to visit.  This information is correct and is consistent with my treatment plan as of today's visit.     Brooke Clay, APRN  02/25/25 1123  [facility]    Franklin Galvin MD  [address] [facility] [address]  [place] [id number]    Thank you for asking me to see Brittany Villeda.     Congestive Heart Failure  Pertinent negatives include no chest pain or shortness of breath.       1.  Pulmonary hypertension  2.  HFpEF    Subjective     Brittanyyuri Damian a 89 y.o. female who presents today for pulmonary hypertension, HFpEF.   The patient's most recent 2D TTE showed evidence of pulmonary hypertension and right-sided involvement.  She does have biatrial dilatation.  She was diagnosed with HUGO. She does not use a CPAP. She was diagnosed with complete heart block in the past and required placement of a PPM--Status post [employer] dual-chamber pacemaker in 2018 .  She also was diagnosed with COPD.  She does not routinely follow with pulmonary medicine.  She also was diagnosed with MGUS.      Since last visit patient had admission in December 20, 2024 with COVID-19.  She was treated with Paxlovid and was able to be discharged.    Patient had hospitalization 1/18/2025 to 1/20/2025.  Acute decompensated Heart falure Hospitalization.  She received IV Lasix for diuresis with improvement.  She was discharged on home medications-Lasix 20 mg daily, spironolactone 12.5 mg daily. Feels like this was  due to not having medicaitons.     Stable exercise intolerance.  Denies any abdominal distention.    She does report exertional dyspnea/shortness of breath.   Denies any PND.         Review of Systems - Review of Systems   Constitutional: Negative for weight gain and weight loss.   Cardiovascular:  Negative for chest pain, dyspnea on exertion, leg swelling, orthopnea, paroxysmal nocturnal dyspnea and syncope.   Respiratory:  Negative for cough, shortness of breath, sleep disturbances due to breathing and snoring.    Gastrointestinal:  Negative for bloating.   Neurological:  Negative for dizziness, light-headedness and weakness.          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-sided sciatica   • Essential hypertension   • Gastroesophageal reflux disease without esophagitis   • Cataract   • Pulmonary hypertension   • Diastolic dysfunction   • HUGO (obstructive sleep apnea)   • Trifascicular block   • MGUS (monoclonal gammopathy of unknown significance)   • Ulcerative rectosigmoiditis without complication   • Lichen sclerosus   • Heart block   • Sleep-related hypoxia   • Bradycardia   • History of atrial fibrillation   • Pacemaker   • Paroxysmal SVT (supraventricular tachycardia)   • History of chest pain   • Palpitations   • Paroxysmal atrial fibrillation   • Ascending aortic aneurysm   • Mediastinal lymphadenopathy   • Anemia   • Obesity (BMI 30-39.9)   • Generalized weakness   • Dysautonomia   • Hypercalcemia   • Hyperparathyroidism   • Choledocholithiasis   • Duodenal ulcer   • Hemorrhagic gastritis   • Exertional dyspnea   • COPD (chronic obstructive pulmonary disease)   • Osteoarthritis of glenohumeral joint   • ICH (intracerebral hemorrhage)   • Lower GI bleed   • Thrombocytopenia   • Lichen sclerosus of female genitalia   • Senile atrophic vaginitis   • Left inguinal hernia   • Vulvar pain   • Bilateral lower extremity edema   •  Chronic heart failure with preserved ejection fraction   • Acute renal insufficiency   • Dysfunction of right cardiac ventricle   • COVID-19 virus infection   • Shortness of breath   • Acute lower GI bleeding     family history includes Breast cancer in her sister; Diabetes in her mother; Heart disease in her sister; Kidney cancer in her sister; Prostate cancer in her brother, brother, and brother.   reports that she quit smoking about 60 years ago. Her smoking use included cigarettes. She started smoking about 72 years ago. She has a 18 pack-year smoking history. She has been exposed to tobacco smoke. She has never used smokeless tobacco. She reports that she does not drink alcohol and does not use drugs.  Allergies   Allergen Reactions   • Codeine Hallucinations     Tolerates hydromorphone   • Amitriptyline Rash   • Amoxicillin-Pot Clavulanate Rash   • Aspirin Unknown - Low Severity     Patient doesn't know why   • Carisoprodol-Aspirin-Codeine Palpitations   • Iodinated Contrast Media Rash   • Latex Rash   • Naproxen Rash   • Nsaids Unknown - Low Severity     UNSURE OF REACTION   • Soma Compound With Codeine [Carisoprodol-Aspirin-Codeine] Rash   • Sulfa Antibiotics Rash   • Tramadol Palpitations     heart races      Physical Activity: Sufficiently Active (4/26/2024)    Exercise Vital Sign    • Days of Exercise per Week: 5 days    • Minutes of Exercise per Session: 30 min          Current Outpatient Medications:   •  acetaminophen (TYLENOL) 500 MG tablet, Take 1 tablet by mouth 2 (Two) Times a Day., Disp: , Rfl:   •  docusate sodium (COLACE) 100 MG capsule, Take 1 capsule by mouth Daily., Disp: , Rfl:   •  ELDERBERRY PO, Take 2 tablets by mouth Daily., Disp: , Rfl:   •  furosemide (LASIX) 40 MG tablet, Take 0.5 tablets by mouth Daily. Taking 20mg, Disp: 90 tablet, Rfl: 1  •  lidocaine (XYLOCAINE) 5 % ointment, Apply  topically to the appropriate area as directed Daily As Needed for Mild Pain (for rectal  discomfort)., Disp: 35.44 g, Rfl: 5  •  Magnesium Oxide -Mg Supplement 400 (240 Mg) MG tablet, TAKE 1 TABLET BY MOUTH DAILY, Disp: 90 tablet, Rfl: 1  •  mesalamine (APRISO) 0.375 g 24 hr capsule, TAKE FOUR CAPSULES BY MOUTH DAILY, Disp: 360 capsule, Rfl: 2  •  mesalamine (CANASA) 1000 MG suppository, Insert 1 suppository into the rectum At Night As Needed (pain/bleeding)., Disp: 30 each, Rfl: 1  •  pantoprazole (PROTONIX) 40 MG EC tablet, TAKE 1 TABLET BY MOUTH DAILY, Disp: 90 tablet, Rfl: 1  •  spironolactone (ALDACTONE) 25 MG tablet, Take 0.5 tablets by mouth Daily., Disp: , Rfl:   •  tacrolimus (PROTOPIC) 0.1 % ointment, , Disp: , Rfl:   •  valACYclovir (VALTREX) 1000 MG tablet, Take 1 tablet by mouth Daily., Disp: , Rfl:   •  vitamin B-12 (CYANOCOBALAMIN) 1000 MCG tablet, TAKE 1 TABLET BY MOUTH DAILY, Disp: 90 tablet, Rfl: 1    Objective  Vital Sign Review:   Vitals:    02/25/25 1029   BP: 122/72   Pulse: 60   SpO2: 98%     Body mass index is 29.51 kg/m².      02/25/25  1029   Weight: 78 kg (172 lb)       Physical Exam:  Constitutional:       Appearance: Normal and healthy appearance.   Neck:      Vascular: No carotid bruit or JVD. JVD normal.   Pulmonary:      Effort: Pulmonary effort is normal.      Breath sounds: Normal breath sounds.   Cardiovascular:      PMI at left midclavicular line. Normal rate. Regular rhythm.   Pulses:     Intact distal pulses.   Edema:     Peripheral edema absent.   Abdominal:      Palpations: Abdomen is soft.      Tenderness: There is no abdominal tenderness.   Skin:     General: Skin is warm and dry.   Neurological:      General: No focal deficit present.      Mental Status: Alert and oriented to person, place and time.   Psychiatric:         Behavior: Behavior is cooperative.          DATA REVIEWED:   EKG:  ECG 12 Lead     Date/Time: 8/12/2024 1:25 PM  Performed by: Sydney Gilliam APRN     Authorized by: Sydney Gilliam APRN  Comparison: compared with previous ECG    Similar to previous ECG  Rhythm: atrial fibrillation and paced  Rate: normal  BPM: 67  Conduction: right bundle branch block  QRS axis: normal  Comments: Similar to prior    ---------------------------------------------------  ECHO:    Results for orders placed during the hospital encounter of 04/23/24    Adult Transthoracic Echo Complete W/ Cont if Necessary Per Protocol    Interpretation Summary  •  Left ventricular systolic function is normal. Left ventricular ejection fraction appears to be 61 - 65%.  •  Left ventricular diastolic function was indeterminate.  •  The right ventricular cavity is mildly dilated. Normal right ventricular systolic function noted.  •  The left atrial cavity is severely dilated.  •  The right atrial cavity is severely dilated.  •  Saline test results are negative.  •  Mild aortic valve regurgitation is present.  •  Mild mitral valve regurgitation is present.  •  Mild to moderate tricuspid valve regurgitation is present.  •  Calculated right ventricular systolic pressure from tricuspid regurgitation is 38 mmHg.  •  There is no evidence of pericardial effusion.          -----------------------------------------------------  RHC/LHC    No results found for this or any previous visit.      ---------------------------------------------------------------------------    CT:   CTA chest  FINDINGS:  1. There is no convincing evidence for pulmonary thromboemboli. Again  seen is heterogeneous admixture of contrast of the pulmonary arteries at  the right lower lobe, but no pulmonary thromboemboli are seen. Pulmonary  arteries are enlarged with the main pulmonary artery measuring 3.8 cm in  diameter, pulmonary arterial hypertension.     The thoracic aorta is ectatic at 4.0 cm, stable when measured along the  same planes. The diameter tapers to 3 cm at the aortic arch.     2. Stable marked elevation of the right hemidiaphragm with significant  compressive atelectasis at the right lower lobe. No  new airspace  consolidations are seen suspicious for pneumonia. There are no pleural  or pericardial effusions. There is cardiomegaly and findings of  right-sided cardiac insufficiency, unchanged.     3. Enlarged mediastinal nodes are stable and extensive multinodular  goiter appears stable, although not seen in its entirety. No new  abnormality seen at the visualized upper abdomen, multiple renal cysts  are noted.      --------------------------------------------------------------------------------------------------------------    Laboratory evaluations:  Renal Function: Estimated Creatinine Clearance: 53.5 mL/min (by C-G formula based on SCr of 0.72 mg/dL).    Lab Results   Component Value Date    GLUCOSE 87 02/12/2025    BUN 14 02/12/2025    CREATININE 0.72 02/12/2025    EGFRIFNONA 77 07/29/2021    EGFRIFAFRI 115 01/24/2022    BCR 19.4 02/12/2025    K 4.6 02/12/2025    CO2 21.2 (L) 02/12/2025    CALCIUM 9.7 02/12/2025    ALBUMIN 3.7 02/10/2025    AST 13 02/10/2025    ALT <5 02/10/2025     Lab Results   Component Value Date    WBC 2.36 (L) 02/11/2025    HGB 11.2 (L) 02/11/2025    HCT 32.3 (L) 02/11/2025    .2 (H) 02/11/2025    PLT 99 (L) 02/11/2025     Lab Results   Component Value Date    HGBA1C 5.4 04/25/2022     Lab Results   Component Value Date    HGBA1C 5.4 04/25/2022    HGBA1C 4.90 06/23/2021    HGBA1C 5.30 05/11/2021     Lab Results   Component Value Date    CREATININE 0.72 02/12/2025     Lab Results   Component Value Date    IRON 49 02/10/2025    TIBC 328 02/10/2025    FERRITIN 87.90 02/10/2025       Result Review:  I have personally reviewed the results from the time of this admission to 2/25/2025 11:10 EST and agree with these findings:  [x]  Laboratory list / accordion  []  Microbiology  [x]  Radiology  [x]  EKG/Telemetry   [x]  Cardiology/Vascular   []  Pathology  [x]  Old records  []  Other:  Most notable findings include:   Pacemaker interrogation-1/19/2025-Presenting: AF/ @ 60  bpm  Battery: 5 MONTHS!- WATCH FOR RRT!  Sensing, impedance, and thresholds reviewed  Events: No new events; AF known to pt; AC d/c due to hx of GI  bleed and intracranial hemorrhage.  Heart Failure: NA  Heart rate histograms reviewed  Pacing and detection parameters reviewed  Planned follow-up: Remote monitoring only per Chart Notes  Remote monitoring: Pt is connected in Lat[facility].        ReDS VOLUMETRIC ASSESSMENT:  UTO      Assessment & Plan     No diagnosis found.      Pulmonary hypertension-she has HFpEF and HUGO. we will focus on GDMT for heart failure.  No current indications today for the initiation of PAH-specific medications.  However, if she has any further interval negative remodeling of her RV. May consider VQ scan in the future to exclude CTEPH.  PFTs showed Mild restrictive lung disease with a moderate diffusion deficit.     HFpEF.  Today, euvolemic.  She did have recent heart failure exacerbation and was admitted.  Diuretics were continued at discharge at normal dose.  Patient's family feels that this exacerbation was related to patient not getting her medications as her daughter/primary caregiver was sick and not managing her medicines at that time.    Labs reviewed from 1/20/2025 and renal function stable.  Tentative plan to repeat echo in April  Patient has been feeling generally fatigued.  We will check iron profile/ferritin with lab draw at next visit  GDMT listed below    3. Right ventricular dysfunction--would like to keep patient on spironolactone 12.5mg daily.       Renal function done today was fasting--has been stable on medications.       NYHA stage C FC-III     Clinical status was assessed and has remained stable.  Treatment intent: curative   ReDS reading was not obtained today.  ReDS result: UTO       Today, Patient appears euvolemic. and with perfusion. The patient's hemodynamics are currently acceptable. HR is: normal and is at goal. BP/MAP was reviewed and there is not room for  medication up-titration.  The patient's clinical course has been impacted by Arrythmia-persistent afib, untreated HUGO. LVEF: 61-65%.     GDMT Assessment:     GDMT changes recommended today: No changes to medication therapy.      BB:   Rate controlled without BB--possible future addition as patient has pacemaker if needed  Monitor heart rate.  Please call the [facility] for HR<50, dizziness, lightheadedness, syncope     A/A/A:   Hemodynamics have not previously allow--recent issues with hyperkalemia.  Patient was taking extra potassium supplement     [id number]:  Not a candidate due to having severe vaginal yeast infections.       MRA:  The patient is FC-NYHA Class III and MRA is indicated.   continue Spironolactone to 12.5mg daily  BMP at next visit--- order placed          Diuretic Regimen:  -DIURETIC:   furosemide (LASIX) 20 mg every day  -Fluid restriction:   -requested  -2000 ml  -Patient has been asked to weigh daily and was provided with a printed diuretic strategy.  -Sodium restriction:   -1,500 mg Na restriction was discussed.      Labs/Diagnostics/Referrals:    Labs -Chem-7       Lifestyle Advice:   - call office if I gain more than 2 pounds in one day or 5 pounds in one week   - keep legs up while sitting   - use salt in moderation   - watch for swelling in feet, ankles and legs every day   - weigh myself daily   -call for concerning s/sx   -- activity or exercise based on tolerance encouraged     CODE STATUS: FULL              No follow-ups on file.  F/u as scheduled            Thank you for allowing me to participate in the care of your patient,         Brooke Rubio [place], SHAHID  [facility] FAIL[facility]  02/25/25  09:12 EDT      **Doen Disclaimer:**  Much of this encounter note is an electronic transcription/translation of spoken language to printed text. The electronic translation of spoken language may permit erroneous, or at times, nonsensical words or phrases to be inadvertently transcribed. Although I  have reviewed the note for such errors, some may still exist.    The information in this note was reviewed and updated during the visit to be as accurate as possible. As many patients have chronic medical problems, many of their individual problems and ongoing plans do not change significantly from visit to visit.  This information is correct and is consistent with my treatment plan as of today's visit.

## 2025-02-25 NOTE — PROGRESS NOTES
[facility]    Franklin Galvin MD  [address]  [address]  [place] [address]    Thank you for asking me to see Brittany Villeda.     Congestive Heart Failure  Pertinent negatives include no chest pain or shortness of breath.       1.  Pulmonary hypertension  2.  HFpEF    Subjective      Brittany Damian a 89 y.o. female who presents today for pulmonary hypertension, HFpEF.   The patient's most recent 2D TTE showed evidence of pulmonary hypertension and right-sided involvement.  She does have biatrial dilatation.  She was diagnosed with HUGO. She does not use a CPAP. She was diagnosed with complete heart block in the past and required placement of a PPM--Status post [employer] dual-chamber pacemaker in 2018 .  She also was diagnosed with COPD.  She does not routinely follow with pulmonary medicine.  She also was diagnosed with MGUS.      Since last visit patient had admission in December 20, 2024 with COVID-19.  She was treated with Paxlovid and was able to be discharged.    Patient had hospitalization 1/18/2025 to 1/20/2025.  Acute decompensated Heart falure Hospitalization.  She received IV Lasix for diuresis with improvement.  She was discharged on home medications-Lasix 20 mg daily, spironolactone 12.5 mg daily. Feels like this was due to not having medicaitons.     Stable exercise intolerance.  Denies any abdominal distention.    She does report exertional dyspnea/shortness of breath.   Denies any PND.         Review of Systems - Review of Systems   Constitutional: Negative for weight gain and weight loss.   Cardiovascular:  Negative for chest pain, dyspnea on exertion, leg swelling, orthopnea, paroxysmal nocturnal dyspnea and syncope.   Respiratory:  Negative for cough, shortness of breath, sleep disturbances due to breathing and snoring.    Gastrointestinal:  Negative for bloating.   Neurological:  Negative for dizziness, light-headedness and  weakness.          Patient Active Problem List   Diagnosis    Non-toxic multinodular goiter    Chronic midline low back pain with right-sided sciatica    Essential hypertension    Gastroesophageal reflux disease without esophagitis    Cataract    Pulmonary hypertension    Diastolic dysfunction    HUGO (obstructive sleep apnea)    Trifascicular block    MGUS (monoclonal gammopathy of unknown significance)    Ulcerative rectosigmoiditis without complication    Lichen sclerosus    Heart block    Sleep-related hypoxia    Bradycardia    History of atrial fibrillation    Pacemaker    Paroxysmal SVT (supraventricular tachycardia)    History of chest pain    Palpitations    Paroxysmal atrial fibrillation    Ascending aortic aneurysm    Mediastinal lymphadenopathy    Anemia    Obesity (BMI 30-39.9)    Generalized weakness    Dysautonomia    Hypercalcemia    Hyperparathyroidism    Choledocholithiasis    Duodenal ulcer    Hemorrhagic gastritis    Exertional dyspnea    COPD (chronic obstructive pulmonary disease)    Osteoarthritis of glenohumeral joint    ICH (intracerebral hemorrhage)    Lower GI bleed    Thrombocytopenia    Lichen sclerosus of female genitalia    Senile atrophic vaginitis    Left inguinal hernia    Vulvar pain    Bilateral lower extremity edema    Chronic heart failure with preserved ejection fraction    Acute renal insufficiency    Dysfunction of right cardiac ventricle    COVID-19 virus infection    Shortness of breath    Acute lower GI bleeding     family history includes Breast cancer in her sister; Diabetes in her mother; Heart disease in her sister; Kidney cancer in her sister; Prostate cancer in her brother, brother, and brother.   reports that she quit smoking about 60 years ago. Her smoking use included cigarettes. She started smoking about 72 years ago. She has a 18 pack-year smoking history. She has been exposed to tobacco smoke. She has never used smokeless tobacco. She reports that she does not  drink alcohol and does not use drugs.  Allergies   Allergen Reactions    Codeine Hallucinations     Tolerates hydromorphone    Amitriptyline Rash    Amoxicillin-Pot Clavulanate Rash    Aspirin Unknown - Low Severity     Patient doesn't know why    Carisoprodol-Aspirin-Codeine Palpitations    Iodinated Contrast Media Rash    Latex Rash    Naproxen Rash    Nsaids Unknown - Low Severity     UNSURE OF REACTION    Soma Compound With Codeine [Carisoprodol-Aspirin-Codeine] Rash    Sulfa Antibiotics Rash    Tramadol Palpitations     heart races      Physical Activity: Sufficiently Active (4/26/2024)    Exercise Vital Sign     Days of Exercise per Week: 5 days     Minutes of Exercise per Session: 30 min          Current Outpatient Medications:     acetaminophen (TYLENOL) 500 MG tablet, Take 1 tablet by mouth 2 (Two) Times a Day., Disp: , Rfl:     docusate sodium (COLACE) 100 MG capsule, Take 1 capsule by mouth Daily., Disp: , Rfl:     ELDERBERRY PO, Take 2 tablets by mouth Daily., Disp: , Rfl:     furosemide (LASIX) 40 MG tablet, Take 0.5 tablets by mouth Daily. Taking 20mg, Disp: 90 tablet, Rfl: 1    lidocaine (XYLOCAINE) 5 % ointment, Apply  topically to the appropriate area as directed Daily As Needed for Mild Pain (for rectal discomfort)., Disp: 35.44 g, Rfl: 5    Magnesium Oxide -Mg Supplement 400 (240 Mg) MG tablet, TAKE 1 TABLET BY MOUTH DAILY, Disp: 90 tablet, Rfl: 1    mesalamine (APRISO) 0.375 g 24 hr capsule, TAKE FOUR CAPSULES BY MOUTH DAILY, Disp: 360 capsule, Rfl: 2    mesalamine (CANASA) 1000 MG suppository, Insert 1 suppository into the rectum At Night As Needed (pain/bleeding)., Disp: 30 each, Rfl: 1    pantoprazole (PROTONIX) 40 MG EC tablet, TAKE 1 TABLET BY MOUTH DAILY, Disp: 90 tablet, Rfl: 1    spironolactone (ALDACTONE) 25 MG tablet, Take 0.5 tablets by mouth Daily., Disp: , Rfl:     tacrolimus (PROTOPIC) 0.1 % ointment, , Disp: , Rfl:     valACYclovir (VALTREX) 1000 MG tablet, Take 1 tablet by mouth  Daily., Disp: , Rfl:     vitamin B-12 (CYANOCOBALAMIN) 1000 MCG tablet, TAKE 1 TABLET BY MOUTH DAILY, Disp: 90 tablet, Rfl: 1    Objective   Vital Sign Review:   Vitals:    02/25/25 1029   BP: 122/72   Pulse: 60   SpO2: 98%     Body mass index is 29.51 kg/m².      02/25/25  1029   Weight: 78 kg (172 lb)       Physical Exam:  Constitutional:       Appearance: Normal and healthy appearance.   Neck:      Vascular: No carotid bruit or JVD. JVD normal.   Pulmonary:      Effort: Pulmonary effort is normal.      Breath sounds: Normal breath sounds.   Cardiovascular:      PMI at left midclavicular line. Normal rate. Regular rhythm.   Pulses:     Intact distal pulses.   Edema:     Peripheral edema absent.   Abdominal:      Palpations: Abdomen is soft.      Tenderness: There is no abdominal tenderness.   Skin:     General: Skin is warm and dry.   Neurological:      General: No focal deficit present.      Mental Status: Alert and oriented to person, place and time.   Psychiatric:         Behavior: Behavior is cooperative.          DATA REVIEWED:   EKG:  ECG 12 Lead     Date/Time: 8/12/2024 1:25 PM  Performed by: Sydney Gilliam APRN     Authorized by: Sydney Gilliam APRN  Comparison: compared with previous ECG   Similar to previous ECG  Rhythm: atrial fibrillation and paced  Rate: normal  BPM: 67  Conduction: right bundle branch block  QRS axis: normal  Comments: Similar to prior    ---------------------------------------------------  ECHO:    Results for orders placed during the hospital encounter of 04/23/24    Adult Transthoracic Echo Complete W/ Cont if Necessary Per Protocol    Interpretation Summary    Left ventricular systolic function is normal. Left ventricular ejection fraction appears to be 61 - 65%.    Left ventricular diastolic function was indeterminate.    The right ventricular cavity is mildly dilated. Normal right ventricular systolic function noted.    The left atrial cavity is severely dilated.     The right atrial cavity is severely dilated.    Saline test results are negative.    Mild aortic valve regurgitation is present.    Mild mitral valve regurgitation is present.    Mild to moderate tricuspid valve regurgitation is present.    Calculated right ventricular systolic pressure from tricuspid regurgitation is 38 mmHg.    There is no evidence of pericardial effusion.          -----------------------------------------------------  RHC/LHC    No results found for this or any previous visit.      ---------------------------------------------------------------------------    CT:   CTA chest  FINDINGS:  1. There is no convincing evidence for pulmonary thromboemboli. Again  seen is heterogeneous admixture of contrast of the pulmonary arteries at  the right lower lobe, but no pulmonary thromboemboli are seen. Pulmonary  arteries are enlarged with the main pulmonary artery measuring 3.8 cm in  diameter, pulmonary arterial hypertension.     The thoracic aorta is ectatic at 4.0 cm, stable when measured along the  same planes. The diameter tapers to 3 cm at the aortic arch.     2. Stable marked elevation of the right hemidiaphragm with significant  compressive atelectasis at the right lower lobe. No new airspace  consolidations are seen suspicious for pneumonia. There are no pleural  or pericardial effusions. There is cardiomegaly and findings of  right-sided cardiac insufficiency, unchanged.     3. Enlarged mediastinal nodes are stable and extensive multinodular  goiter appears stable, although not seen in its entirety. No new  abnormality seen at the visualized upper abdomen, multiple renal cysts  are noted.      --------------------------------------------------------------------------------------------------------------    Laboratory evaluations:  Renal Function: Estimated Creatinine Clearance: 53.5 mL/min (by C-G formula based on SCr of 0.72 mg/dL).    Lab Results   Component Value Date    GLUCOSE 87 02/12/2025     BUN 14 02/12/2025    CREATININE 0.72 02/12/2025    EGFRIFNONA 77 07/29/2021    EGFRIFAFRI 115 01/24/2022    BCR 19.4 02/12/2025    K 4.6 02/12/2025    CO2 21.2 (L) 02/12/2025    CALCIUM 9.7 02/12/2025    ALBUMIN 3.7 02/10/2025    AST 13 02/10/2025    ALT <5 02/10/2025     Lab Results   Component Value Date    WBC 2.36 (L) 02/11/2025    HGB 11.2 (L) 02/11/2025    HCT 32.3 (L) 02/11/2025    .2 (H) 02/11/2025    PLT 99 (L) 02/11/2025     Lab Results   Component Value Date    HGBA1C 5.4 04/25/2022     Lab Results   Component Value Date    HGBA1C 5.4 04/25/2022    HGBA1C 4.90 06/23/2021    HGBA1C 5.30 05/11/2021     Lab Results   Component Value Date    CREATININE 0.72 02/12/2025     Lab Results   Component Value Date    IRON 49 02/10/2025    TIBC 328 02/10/2025    FERRITIN 87.90 02/10/2025       Result Review:  I have personally reviewed the results from the time of this admission to 2/25/2025 11:10 EST and agree with these findings:  [x]  Laboratory list / accordion  []  Microbiology  [x]  Radiology  [x]  EKG/Telemetry   [x]  Cardiology/Vascular   []  Pathology  [x]  Old records  []  Other:  Most notable findings include:   Pacemaker interrogation-1/19/2025-Presenting: AF/ @ 60 bpm  Battery: 5 MONTHS!- WATCH FOR RRT!  Sensing, impedance, and thresholds reviewed  Events: No new events; AF known to pt; AC d/c due to hx of GI  bleed and intracranial hemorrhage.  Heart Failure: NA  Heart rate histograms reviewed  Pacing and detection parameters reviewed  Planned follow-up: Remote monitoring only per Chart Notes  Remote monitoring: Pt is connected in [facility].        ReDS VOLUMETRIC ASSESSMENT:  UTO      Assessment & Plan      No diagnosis found.      Pulmonary hypertension-she has HFpEF and HUGO. we will focus on GDMT for heart failure.  No current indications today for the initiation of PAH-specific medications.  However, if she has any further interval negative remodeling of her RV. May consider VQ scan in the  future to exclude CTEPH.  PFTs showed Mild restrictive lung disease with a moderate diffusion deficit.     HFpEF.  Today, euvolemic.  She did have recent heart failure exacerbation and was admitted.  Diuretics were continued at discharge at normal dose.  Patient's family feels that this exacerbation was related to patient not getting her medications as her daughter/primary caregiver was sick and not managing her medicines at that time.    Labs reviewed from 1/20/2025 and renal function stable.  Tentative plan to repeat echo in April  Patient has been feeling generally fatigued.  We will check iron profile/ferritin with lab draw at next visit  GDMT listed below    3. Right ventricular dysfunction--would like to keep patient on spironolactone 12.5mg daily.       Renal function done today was fasting--has been stable on medications.       NYHA stage C FC-III     Clinical status was assessed and has remained stable.  Treatment intent: curative   ReDS reading was not obtained today.  ReDS result: UTO       Today, Patient appears euvolemic. and with perfusion. The patient's hemodynamics are currently acceptable. HR is: normal and is at goal. BP/MAP was reviewed and there is not room for medication up-titration.  The patient's clinical course has been impacted by Arrythmia-persistent afib, untreated HUGO. LVEF: 61-65%.     GDMT Assessment:     GDMT changes recommended today: No changes to medication therapy.      BB:   Rate controlled without BB--possible future addition as patient has pacemaker if needed  Monitor heart rate.  Please call the [facility] for HR<50, dizziness, lightheadedness, syncope     A/A/A:   Hemodynamics have not previously allow--recent issues with hyperkalemia.  Patient was taking extra potassium supplement     ONT1Y-I:  Not a candidate due to having severe vaginal yeast infections.       MRA:  The patient is FC-NYHA Class III and MRA is indicated.   continue Spironolactone to 12.5mg daily  BMP at next  visit--- order placed          Diuretic Regimen:  -DIURETIC:   furosemide (LASIX) 20 mg every day  -Fluid restriction:   -requested  -2000 ml  -Patient has been asked to weigh daily and was provided with a printed diuretic strategy.  -Sodium restriction:   -1,500 mg Na restriction was discussed.      Labs/Diagnostics/Referrals:    Labs -Chem-7       Lifestyle Advice:   - call office if I gain more than 2 pounds in one day or 5 pounds in one week   - keep legs up while sitting   - use salt in moderation   - watch for swelling in feet, ankles and legs every day   - weigh myself daily   -call for concerning s/sx   -- activity or exercise based on tolerance encouraged     CODE STATUS: FULL              No follow-ups on file.  F/u as scheduled            Thank you for allowing me to participate in the care of your patient,         Brooke Rubio SHAHID [place]  [facility] FAIL[facility]  02/25/25  09:12 EDT      **Deon Disclaimer:**  Much of this encounter note is an electronic transcription/translation of spoken language to printed text. The electronic translation of spoken language may permit erroneous, or at times, nonsensical words or phrases to be inadvertently transcribed. Although I have reviewed the note for such errors, some may still exist.    The information in this note was reviewed and updated during the visit to be as accurate as possible. As many patients have chronic medical problems, many of their individual problems and ongoing plans do not change significantly from visit to visit.  This information is correct and is consistent with my treatment plan as of today's visit.

## 2025-02-27 ENCOUNTER — OFFICE VISIT (OUTPATIENT)
Dept: FAMILY MEDICINE CLINIC | Facility: CLINIC | Age: OVER 89
End: 2025-02-27
Payer: MEDICARE

## 2025-02-27 ENCOUNTER — TELEPHONE (OUTPATIENT)
Dept: FAMILY MEDICINE CLINIC | Facility: CLINIC | Age: OVER 89
End: 2025-02-27

## 2025-02-27 VITALS
BODY MASS INDEX: 27.01 KG/M2 | SYSTOLIC BLOOD PRESSURE: 97 MMHG | WEIGHT: 158.2 LBS | TEMPERATURE: 96.9 F | DIASTOLIC BLOOD PRESSURE: 51 MMHG | HEART RATE: 68 BPM | OXYGEN SATURATION: 97 % | HEIGHT: 64 IN

## 2025-02-27 DIAGNOSIS — K51.30 ULCERATIVE RECTOSIGMOIDITIS WITHOUT COMPLICATION: ICD-10-CM

## 2025-02-27 DIAGNOSIS — Z09 HOSPITAL DISCHARGE FOLLOW-UP: Primary | ICD-10-CM

## 2025-02-27 DIAGNOSIS — I50.32 CHRONIC DIASTOLIC CONGESTIVE HEART FAILURE: ICD-10-CM

## 2025-02-27 NOTE — PROGRESS NOTES
Transitional Care Follow Up Visit  Subjective     Brittany Villeda is a 89 y.o. female who presents for a transitional care management visit.    Within 48 business hours after discharge our office contacted her via telephone to coordinate her care and needs.      I reviewed and discussed the details of that call along with the discharge summary, hospital problems, inpatient lab results, inpatient diagnostic studies, and consultation reports with Brittany.     Current outpatient and discharge medications have been reconciled for the patient.  Reviewed by: Mega Esparza MD          2/12/2025     6:37 PM   Date of TCM Phone Call   [facility]   Date of Admission 2/10/2025   Date of Discharge 2/12/2025   Discharge Disposition Home or Self Care     Risk for Readmission (LACE) Score: 11 (2/12/2025  6:00 AM)      History of Present Illness   Course During Hospital Stay: The patient presented to the emergency room earlier this month complaining of rectal bleeding and abdominal pain with a history of ulcerative colitis.  She was admitted for evaluation treatment and GI consultation.  They continued the home mesalamine and started hydrocortisone suppositories.  Her rectal bleeding improved and the abdominal discomfort resolved.    She was also mid to late January with dyspnea.  Diagnosed with heart failure with preserved ejection fraction.  Chest x-ray showed cardiomegaly with vascular congestion.  She was diuresed with 3.3 L of urine output then.  Home diuretics were restarted.  Cardiology saw her with no further recommendations.  She had a episode of hyperkalemia and acute kidney injury on injury that resolved on discharge.  Her last echocardiogram was in April of last year, with left ventricular systolic function normal but left ventricular diastolic function indeterminate.    Her blood pressure is slightly low today.   Had 1 episode of lightheadedness earlier this week.  But otherwise feeling fine    Recent  "lab work demonstrates a creatinine of 1.29 with normal potassium.    She was seen by both cardiology and GI 2 days ago.  The gastroenterologist continue the Canasa suppositories and oral mesalamine.  The cardiology note is not complete yet.    She is weighing herself daily.  It is stable.  Minimal swelling.  No shortness of breath.     The following portions of the patient's history were reviewed and updated as appropriate: allergies, current medications, past family history, past medical history, past social history, past surgical history, and problem list.    Review of Systems    Objective   BP 97/51   Pulse 68   Temp 96.9 °F (36.1 °C) (Temporal)   Ht 162.6 cm (64.02\")   Wt 71.8 kg (158 lb 3.2 oz)   SpO2 97%   BMI 27.14 kg/m²   Physical Exam  Constitutional:       Appearance: Normal appearance.   Cardiovascular:      Rate and Rhythm: Normal rate and regular rhythm.   Pulmonary:      Effort: Pulmonary effort is normal.      Breath sounds: Normal breath sounds.   Musculoskeletal:      Comments:     +1 bilateral lower extremity edema.  Improved.   Psychiatric:         Mood and Affect: Mood normal.         Assessment & Plan   Diagnoses and all orders for this visit:    1. Hospital discharge follow-up (Primary)  -     CBC & Differential; Future  -     Comprehensive Metabolic Panel; Future  -     Lipid Panel; Future    2. 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    Heart failure with preserved ejection fraction.  Continue spironolactone 25 mg every other day.  Continue furosemide 20 mg daily.  With the jump in weight overnight, increased ankle swelling, or mild shortness of breath increase furosemide to 40 mg daily " and let cardiology know.  Or myself.  Avoid heavy sodium diet.  And see me back this summer.    Ulcerative colitis.  The rectal bleeding has stopped.  She continues to follow with her GI specialist.

## 2025-02-27 NOTE — TELEPHONE ENCOUNTER
Caller: Concepcion Diana    Relationship: Emergency Contact    Best call back number:     [phone]       What was the call regarding: CONCEPCION IS REQUESTING A CALL BACK. SHE IS REQUESTING TO SPEAK TO MARCELLUS

## 2025-03-02 LAB — CALPROTECTIN STL-MCNT: 89 UG/G (ref 0–120)

## 2025-03-11 NOTE — PROGRESS NOTES
Controlled  Continue same medication regimen  Continue low-sodium diet    Orders:    Basic Metabolic Panel; Future    CBC WITHOUT DIFFERENTIAL; Future    MICROALB/CREAT RATIO RAND. UR     No osteoporosis.  There is osteopenia.  No treatment needed at this time.  Other than calcium and vitamin D over-the-counter

## 2025-03-17 ENCOUNTER — TELEPHONE (OUTPATIENT)
Dept: FAMILY MEDICINE CLINIC | Facility: CLINIC | Age: OVER 89
End: 2025-03-17

## 2025-04-14 ENCOUNTER — OFFICE VISIT (OUTPATIENT)
Dept: CARDIOLOGY | Age: OVER 89
End: 2025-04-14
Payer: MEDICAID

## 2025-04-14 VITALS
DIASTOLIC BLOOD PRESSURE: 62 MMHG | HEART RATE: 65 BPM | BODY MASS INDEX: 30.05 KG/M2 | HEIGHT: 64 IN | WEIGHT: 176 LBS | SYSTOLIC BLOOD PRESSURE: 114 MMHG

## 2025-04-14 DIAGNOSIS — I51.9 DYSFUNCTION OF RIGHT CARDIAC VENTRICLE: ICD-10-CM

## 2025-04-14 DIAGNOSIS — G47.33 OSA (OBSTRUCTIVE SLEEP APNEA): ICD-10-CM

## 2025-04-14 DIAGNOSIS — R94.2 ABNORMAL PULMONARY FUNCTION TEST: ICD-10-CM

## 2025-04-14 DIAGNOSIS — I48.0 PAROXYSMAL ATRIAL FIBRILLATION: ICD-10-CM

## 2025-04-14 DIAGNOSIS — I71.21 ANEURYSM OF ASCENDING AORTA WITHOUT RUPTURE: ICD-10-CM

## 2025-04-14 DIAGNOSIS — G90.1 DYSAUTONOMIA: ICD-10-CM

## 2025-04-14 DIAGNOSIS — R00.1 BRADYCARDIA: ICD-10-CM

## 2025-04-14 DIAGNOSIS — I45.9 HEART BLOCK: ICD-10-CM

## 2025-04-14 DIAGNOSIS — I50.32 CHRONIC HEART FAILURE WITH PRESERVED EJECTION FRACTION: Primary | ICD-10-CM

## 2025-04-14 DIAGNOSIS — I10 ESSENTIAL HYPERTENSION: ICD-10-CM

## 2025-04-14 DIAGNOSIS — I27.20 PULMONARY HYPERTENSION: ICD-10-CM

## 2025-04-14 PROCEDURE — 93000 ELECTROCARDIOGRAM COMPLETE: CPT | Performed by: NURSE PRACTITIONER

## 2025-04-14 PROCEDURE — 99214 OFFICE O/P EST MOD 30 MIN: CPT | Performed by: NURSE PRACTITIONER

## 2025-04-14 NOTE — PROGRESS NOTES
Date of Office Visit: 2025  Encounter Provider: SHAHID García  Place of Service: [facility] CARDIOLOGY  Patient Name: Brittany Villeda  :1935    Chief complaint  Paroxysmal atrial fibrillation, thoracic aortic aneurysm     History of Present Illness  Patient is a 89 y.o. year old female  patient of Dr. Culp. Past medical history includes hypertension with hypertensive heart disease, obstructive sleep apnea (on BiPAP) obesity, immobility, pulmonary hypertension, history of nonsustained ventricular tachycardia, and obstructive sleep apnea.  She has a history of diastolic dysfunction, nonsustained ventricular tachycardia, atrial fibrillation and bradycardia.  She had a stress perfusion study that was negative in 2019.  She eventually underwent pacemaker placement 2018.  Echo 2021 with ejection fraction 60% with mild RV dysfunction negative saline injection with severe biatrial enlargement and no significant valvular heart disease.  RVSP was 38 mmHg. CT angiogram of the chest was on 2023 when she was in the emergency room for pleuritic chest pain.  Not show pulmonary emboli.  It was a limited evaluation of the aorta.  Previously noted to be 4.1 cm aorta.  The aorta was reviewed with the aortic root measuring 3.6 cm acing aorta measuring 3.7 cm and dilated hepatic veins consistent with right-sided heart failure were noted On 2024 patient was admitted with edema and found to have congestive heart failure treated with IV diuretics and discharged home on Lasix.  It was also suggested she start Jardiance.  Echo at that time showed an ejection fraction 60 to 65% indeterminate diastolic function, mild valve regurgitation and mild to moderate tricuspid regurgitation RVSP 38 mmHg. .  Patient was seen in the emergency room 2024 with ulcerative rectosigmoiditis and rectal bleeding.      She was recently admitted at the end of 2024 with COVID-19. She  was evaluated by ENT during that admission and was noted to have a stable mild airway stricture.      She presented to the ED 1/19/25 with complaints of acute cough and dyspnea that had been worsening for the prior 3 days. She denies fevers but has had generalized fatigue. When paramedics arrived to her home they noted her to be audibly wheezing. She is comfortable at rest but does note dyspnea with mild exertion. She was initially diuresed with IV furosemide and discharged on home regimen of spironolactone and PO furosemide. It was noted that she was out of medications prior to hospitalization. Recent remote device check on 3/14/2025 showed 3 months to RRT.  No new events.  She continues to remain in atrial fibrillation.    Interval history  Patient presents today for routine follow-up.  I will visit with her today and have reviewed her medical record.  Since last visit she continues to leave sleep apnea untreated.  She denies palpitations, shortness of breath, edema, dizziness, chest pain or chest pressure, syncope or presyncope.  She walks that she is able and uses wheelchair for long distances.  Blood pressure in office today is well-controlled and she reports readings at home are similar.  Weight has been stable.  She continues to follow with heart failure clinic.  She remains off anticoagulation due to risk of bleeding and significant falls.    Past Medical History:   Diagnosis Date    Abdominal aortic aneurysm     Anemia     Arrhythmia     Arthritis     Asthma     Bradycardia     CHF (congestive heart failure) 04/2023    Choledocholithiasis 05/09/2021    Colitis     Diastolic dysfunction     Essential hypertension 05/12/2016    GERD (gastroesophageal reflux disease)     Heart block     Hypertension     Hypertensive heart disease     Hyperthyroidism     REPORTS ENLARGED    Inguinal hernia     Kidney stone     Leukopenia     MGUS (monoclonal gammopathy of unknown significance)     Nephrolithiasis     Pacemaker      Paroxysmal atrial fibrillation     Peptic ulceration     Pressure ulcer     REPORTS ON COCCYX. INSTR TO NOTIFY DR FOSTER'S OFFICE    PSVT (paroxysmal supraventricular tachycardia)     Pulmonary hypertension     Rectal bleed     Sleep apnea     NO DEVICE    Subdural hematoma     Systemic hypertension     Trifascicular block     Ulcerative rectosigmoiditis without complication     Ventricular tachycardia     nonsustained     Past Surgical History:   Procedure Laterality Date    BACK SURGERY      lumbar fusion    DUSTY HOLE Left 08/08/2021    Procedure: Left-sided dusty holes for evacuation of subdural hematoma;  Surgeon: Gustavo Callaway MD;  Location: [facility] MAIN OR;  Service: Neurosurgery;  Laterality: Left;    CARDIAC ELECTROPHYSIOLOGY PROCEDURE N/A 11/07/2018    Procedure: Pacemaker DC new   [place];  Surgeon: Jesus Granda MD;  Location: [facility] CATH INVASIVE LOCATION;  Service: Cardiology    CHOLECYSTECTOMY      COLONOSCOPY  06/16/2014    colitis, cryptitis,  tics, NBIH, TA w/low grade dysplasia    COLONOSCOPY N/A 10/28/2019    Procedure: COLONOSCOPY WITH COLD AND HOT POLYPECTOMIES;  Surgeon: Micaela English MD;  Location: [facility] ENDOSCOPY;  Service: Gastroenterology    ENDOSCOPY N/A 05/13/2021    Procedure: ESOPHAGOGASTRODUODENOSCOPY with BX;  Surgeon: Stefan Mcfadden MD;  Location: [facility] ENDOSCOPY;  Service: Gastroenterology;  Laterality: N/A;  EPIGASTRIC PAIN  --DUODENAL ULCER, HEMORRHAGIC GASTRITIS, HIATAL HERNIA     ENDOSCOPY N/A 10/11/2022    Procedure: ESOPHAGOGASTRODUODENOSCOPY;  Surgeon: Micaela English MD;  [facility];  Service: Gastroenterology;  Laterality: N/A;  PRE- MELENA  POST- ESOPHAGITIS    HEMORRHOIDECTOMY      HERNIA REPAIR      umbilical    HYSTERECTOMY      INGUINAL HERNIA REPAIR Left 09/01/2023    Procedure: INGUINAL HERNIA REPAIR LEFT;  Surgeon: Antonio Foster MD;  [facility];  Service: General;  Laterality: Left;    JOINT REPLACEMENT  Bilateral     KNEES    MYOMECTOMY      PACEMAKER IMPLANTATION      SHOULDER SURGERY      SIGMOIDOSCOPY N/A 11/02/2022    Procedure: SIGMOIDOSCOPY FLEXIBLE WITH COLD BIOPSIES AND ASPIRATE;  Surgeon: Micaela English MD;  Location: [facility] ENDOSCOPY;  Service: Gastroenterology;  Laterality: N/A;  PRE- RECTAL BLEEDING  POST- HEMORRHOIDS, DIVERTICULOSIS, COLITIS    SINUS SURGERY      TOE NAIL AMPUTATION  03/04/2019    TONSILLECTOMY      UPPER GASTROINTESTINAL ENDOSCOPY       Outpatient Medications Prior to Visit   Medication Sig Dispense Refill    acetaminophen (TYLENOL) 500 MG tablet Take 1 tablet by mouth 2 (Two) Times a Day.      docusate sodium (COLACE) 100 MG capsule Take 1 capsule by mouth Daily.      ELDERBERRY PO Take 2 tablets by mouth Daily.      furosemide (LASIX) 40 MG tablet Take 0.5 tablets by mouth Daily. Taking 20mg 90 tablet 1    lidocaine (XYLOCAINE) 5 % ointment Apply  topically to the appropriate area as directed Daily As Needed for Mild Pain (for rectal discomfort). 35.44 g 5    Magnesium Oxide -Mg Supplement 400 (240 Mg) MG tablet TAKE 1 TABLET BY MOUTH DAILY 90 tablet 1    mesalamine (APRISO) 0.375 g 24 hr capsule TAKE FOUR CAPSULES BY MOUTH DAILY 360 capsule 2    mesalamine (CANASA) 1000 MG suppository Insert 1 suppository into the rectum At Night As Needed (pain/bleeding). 30 each 2    pantoprazole (PROTONIX) 40 MG EC tablet TAKE 1 TABLET BY MOUTH DAILY 90 tablet 1    spironolactone (ALDACTONE) 25 MG tablet Take 1 tablet by mouth Every Other Day. 45 tablet 3    tacrolimus (PROTOPIC) 0.1 % ointment       valACYclovir (VALTREX) 1000 MG tablet Take 1 tablet by mouth Daily.      vitamin B-12 (CYANOCOBALAMIN) 1000 MCG tablet TAKE 1 TABLET BY MOUTH DAILY 90 tablet 1     No facility-administered medications prior to visit.       Allergies as of 04/14/2025 - Reviewed 04/14/2025   Allergen Reaction Noted    Codeine Hallucinations 11/30/2017    Amitriptyline Rash 05/12/2016    Amoxicillin-pot clavulanate  "Rash 2016    Aspirin Unknown - Low Severity 2016    Carisoprodol-aspirin-codeine Palpitations 2016    Iodinated contrast media Rash 2016    Latex Rash 2016    Naproxen Rash 2016    Nsaids Unknown - Low Severity 2016    Soma compound with codeine [carisoprodol-aspirin-codeine] Rash 2016    Sulfa antibiotics Rash 2016    Tramadol Palpitations 2019     Social History     Socioeconomic History    Marital status:     Number of children: 10    Years of education: High School   Tobacco Use    Smoking status: Former     Current packs/day: 0.00     Average packs/day: 1.5 packs/day for 12.0 years (18.0 ttl pk-yrs)     Types: Cigarettes     Start date:      Quit date:      Years since quittin.3     Passive exposure: Past    Smokeless tobacco: Never    Tobacco comments:     QUIT SMOKING    Vaping Use    Vaping status: Never Used   Substance and Sexual Activity    Alcohol use: No     Comment: caffeine - decaf coffee    Drug use: Never    Sexual activity: Defer     Family History   Problem Relation Age of Onset    Diabetes Mother     Breast cancer Sister     Kidney cancer Sister     Heart disease Sister     Prostate cancer Brother     Prostate cancer Brother     Prostate cancer Brother     Malig Hyperthermia Neg Hx      Review of Systems   Constitutional: Negative for malaise/fatigue.   Cardiovascular:  Negative for chest pain, claudication, dyspnea on exertion, leg swelling, near-syncope, orthopnea, palpitations, paroxysmal nocturnal dyspnea and syncope.   Respiratory:  Negative for shortness of breath.    Neurological:  Negative for brief paralysis, dizziness, headaches and light-headedness.   All other systems reviewed and are negative.       Objective:     Vitals:    25 0933   BP: 114/62   BP Location: Right arm   Patient Position: Sitting   Cuff Size: Large Adult   Pulse: 65   Weight: 79.8 kg (176 lb)   Height: 162.6 cm (64\")     Body " mass index is 30.21 kg/m².    Vitals reviewed.   Constitutional:       General: Not in acute distress.     Appearance: Well-developed and not in distress. Not diaphoretic.   HENT:      Head: Normocephalic.   Pulmonary:      Effort: Pulmonary effort is normal. No respiratory distress.      Breath sounds: Normal breath sounds. No wheezing. No rhonchi. No rales.   Cardiovascular:      Normal rate. Regular rhythm.      Murmurs: There is no murmur.   Pulses:     Radial: 2+ bilaterally.  Edema:     Peripheral edema absent.   Skin:     General: Skin is warm and dry. There is no cyanosis.      Findings: No rash.   Neurological:      Mental Status: Alert and oriented to person, place, and time.   Psychiatric:         Behavior: Behavior normal. Behavior is cooperative.         Thought Content: Thought content normal.         Judgment: Judgment normal.       Lab Review:     Lab Results   Component Value Date     02/25/2025     02/12/2025    K 4.9 02/25/2025    K 4.6 02/12/2025     02/25/2025     (H) 02/12/2025    CO2 26.4 02/25/2025    CO2 21.2 (L) 02/12/2025    BUN 21 02/25/2025    BUN 14 02/12/2025    CREATININE 1.29 (H) 02/25/2025    CREATININE 0.72 02/12/2025    EGFRIFNONA 77 07/29/2021    EGFRIFNONA 48 (L) 06/19/2020    EGFRIFAFRI 115 01/24/2022    EGFRIFAFRI 90 10/25/2021    GLUCOSE 104 (H) 02/25/2025    GLUCOSE 87 02/12/2025    CALCIUM 10.7 (H) 02/25/2025    CALCIUM 9.7 02/12/2025    ALBUMIN 3.7 02/10/2025    ALBUMIN 3.4 (L) 01/18/2025    BILITOT 0.6 02/10/2025    BILITOT 1.0 01/18/2025    AST 13 02/10/2025    AST 12 01/18/2025    ALT <5 02/10/2025    ALT 6 01/18/2025     Lab Results   Component Value Date    WBC 3.33 (L) 02/25/2025    WBC 2.36 (L) 02/11/2025    HGB 12.6 02/25/2025    HGB 11.2 (L) 02/11/2025    HCT 36.0 02/25/2025    HCT 32.3 (L) 02/11/2025    .7 (H) 02/25/2025    .2 (H) 02/11/2025     (L) 02/25/2025    PLT 99 (L) 02/11/2025     Lab Results   Component Value  Date    PROBNP 1,025.0 01/18/2025    PROBNP 1,449.0 12/20/2024     Lab Results   Component Value Date    CKTOTAL 194 (H) 04/13/2021    TROPONINT 24 (H) 01/19/2025     Lab Results   Component Value Date    TSH 0.346 12/20/2024    TSH 0.817 04/25/2024             ECG 12 Lead    Date/Time: 4/14/2025 9:59 AM  Performed by: Sydney Gilliam APRN    Authorized by: Sydney Gilliam APRN  Comparison: compared with previous ECG   Similar to previous ECG  Rhythm: atrial fibrillation  Rate: normal  BPM: 60  Conduction: right bundle branch block and left anterior fascicular block  QRS axis: normal  Comments: Similar to prior        Assessment:       Diagnosis Plan   1. Chronic heart failure with preserved ejection fraction        2. Pulmonary hypertension        3. Aneurysm of ascending aorta without rupture        4. Bradycardia        5. Paroxysmal atrial fibrillation        6. Essential hypertension        7. HUGO (obstructive sleep apnea)        8. Abnormal pulmonary function test        9. Dysautonomia        10. Dysfunction of right cardiac ventricle        11. Heart block          Plan:       1.  Persistent atrial fibrillation with reasonable rate control.  She is felt to be poor candidate for anticoagulation due to history of recurrent GI bleed and decreased mobility. She appears to be doing well overall.  2.  Diastolic CHF.  Also followed by the heart failure clinic. Had some confusion regarding medications that led to hospitalization in January 2025. No adjustments were made and she appears compensated on exam today with minimal edema.  3.  Pulmonary hypertension.  Being addressed by Dr. Win.   3.  Status post permanent pacemaker placement 2018.  Battery replacement anticipated in the next several months.  Being followed by pacemaker clinic  4.  History of hyperkalemia. Stable at last check. Remains on spironolactone  5..  Dilated asending aorta.  Ascending aorta measured 3.7 cm by CT 4/2023.  Patient did  not complete follow-up imaging as previously recommended though had CT angiogram of the chest when she presented to the emergency room in 5/2024.  This showed an ascending aorta measuring 4 cm.  Pulmonary artery enlarged to 3.8 cm and findings consistent with pulmonary arterial hypertension.  Elevated right hemidiaphragm with compression atelectasis noted.  Mediastinal adenopathy and extensive multinodular goiter noted.  No new changes noted.  7.  History of RV dysfunction and pulmonary hypertension.  Echo on 4/2024 with RVSP 38 mmHg RV dilated but with normal RV function.  Mild valve regurgitation noted with normal systolic function.  Diastolic function indeterminate.  8.  Untreated HUGO, that unfortunately remains untreated. She is uninterested in CPAP   9.  Rectal bleeding with hemorroids.  No recent events  10.  Mediastinal adenopathy.  Followed by Dr. Esparza and appeared stable on most recent CT scan.  11.  Debilitated state.  Slowly ambulating around her home with a walker. She uses wheelchair for long distances outside her home  12.  Thyromegaly with tracheal mass effect.  Additional recommendations per Dr. Esparza. Has been evaluated by ENT as well.      Time Spent: I spent 30 minutes caring for Brittany on this date of service. This time includes time spent by me in the following activities: preparing for the visit, reviewing tests, performing a medically appropriate examination and/or evaluations, counseling and educating the patient/family/caregiver, ordering medications, tests, or procedures, documenting information in the medical record, and independently interpreting results and communicating that information with the patient/family/caregiver.   I spent 1 minutes on the separately reported service of ECG. This time is not included in the time used to support the E/M service also reported today.        Your medication list            Accurate as of April 14, 2025 10:00 AM. If you have any questions, ask your  nurse or doctor.                CONTINUE taking these medications        Instructions Last Dose Given Next Dose Due   acetaminophen 500 MG tablet  Commonly known as: TYLENOL      Take 1 tablet by mouth 2 (Two) Times a Day.       docusate sodium 100 MG capsule  Commonly known as: COLACE      Take 1 capsule by mouth Daily.       ELDERBERRY PO      Take 2 tablets by mouth Daily.       furosemide 40 MG tablet  Commonly known as: LASIX      Take 0.5 tablets by mouth Daily. Taking 20mg       lidocaine 5 % ointment  Commonly known as: XYLOCAINE      Apply  topically to the appropriate area as directed Daily As Needed for Mild Pain (for rectal discomfort).       Magnesium Oxide -Mg Supplement 400 (240 Mg) MG tablet      TAKE 1 TABLET BY MOUTH DAILY       mesalamine 0.375 g 24 hr capsule  Commonly known as: APRISO      TAKE FOUR CAPSULES BY MOUTH DAILY       mesalamine 1000 MG suppository  Commonly known as: CANASA      Insert 1 suppository into the rectum At Night As Needed (pain/bleeding).       pantoprazole 40 MG EC tablet  Commonly known as: PROTONIX      TAKE 1 TABLET BY MOUTH DAILY       spironolactone 25 MG tablet  Commonly known as: ALDACTONE      Take 1 tablet by mouth Every Other Day.       tacrolimus 0.1 % ointment  Commonly known as: PROTOPIC           valACYclovir 1000 MG tablet  Commonly known as: VALTREX      Take 1 tablet by mouth Daily.       vitamin B-12 1000 MCG tablet  Commonly known as: CYANOCOBALAMIN      TAKE 1 TABLET BY MOUTH DAILY                Patient is no longer taking -.  I corrected the med list to reflect this.  I did not stop these medications.    No follow-ups on file.      Dictated utilizing Dragon dictation

## 2025-04-18 ENCOUNTER — TELEPHONE (OUTPATIENT)
Dept: FAMILY MEDICINE CLINIC | Facility: CLINIC | Age: OVER 89
End: 2025-04-18

## 2025-04-18 NOTE — TELEPHONE ENCOUNTER
Called Pt and spoke with Amina (on [employer]) and she stated she will get labs done the week before AWV at a [facility] facility closer to her. Advised they are fasting labs

## 2025-04-18 NOTE — TELEPHONE ENCOUNTER
Caller: Amina Diana    Relationship: Emergency Contact    Best call back number: [phone]       Who are you requesting to speak with (clinical staff, provider,  specific staff member):         What was the call regarding: WANTED TO KNOW IF THE LABS FOR PATIENT ARE FOR HER MEDICARE WELLNESS VISIT IN JUNE?    PLEASE CALL TO ADVISE.

## 2025-04-21 ENCOUNTER — OFFICE VISIT (OUTPATIENT)
Dept: GASTROENTEROLOGY | Facility: CLINIC | Age: OVER 89
End: 2025-04-21
Payer: MEDICAID

## 2025-04-21 VITALS
HEIGHT: 64 IN | SYSTOLIC BLOOD PRESSURE: 110 MMHG | HEART RATE: 61 BPM | WEIGHT: 176 LBS | DIASTOLIC BLOOD PRESSURE: 66 MMHG | BODY MASS INDEX: 30.05 KG/M2 | TEMPERATURE: 97.5 F

## 2025-04-21 DIAGNOSIS — K51.30 ULCERATIVE RECTOSIGMOIDITIS WITHOUT COMPLICATION: ICD-10-CM

## 2025-04-21 PROCEDURE — 99214 OFFICE O/P EST MOD 30 MIN: CPT

## 2025-04-21 PROCEDURE — 1159F MED LIST DOCD IN RCRD: CPT

## 2025-04-21 PROCEDURE — 1160F RVW MEDS BY RX/DR IN RCRD: CPT

## 2025-04-21 RX ORDER — LIDOCAINE 50 MG/G
OINTMENT TOPICAL DAILY PRN
Qty: 50 G | Refills: 5 | Status: SHIPPED | OUTPATIENT
Start: 2025-04-21

## 2025-04-21 RX ORDER — MESALAMINE 1000 MG/1
1000 SUPPOSITORY RECTAL NIGHTLY PRN
Qty: 30 EACH | Refills: 2 | Status: SHIPPED | OUTPATIENT
Start: 2025-04-21

## 2025-04-21 NOTE — PROGRESS NOTES
"Chief Complaint  Ulcerative rectosigmoiditis without complication    Subjective          History of Present Illness    Brittany Villeda is a  89 y.o. female presents for follow up. She is a patient of Dr. English and was last seen by me 2/25/25. She has a history of UC, pulmonary HTN, CKD III, CHF, A Fib and has had her gallbladder removed.     She had issues with rectal bleeding in the fall 2022. She underwent a flexible sigmoidoscopy at that time which did show some mild ulcerative proctosigmoiditis and she was treated with prednisone. She is managed chronically with Apriso 1.5 g daily.     She reports she has been doing well since her last appointment.  She uses a mesalamine suppository nightly.  She has 3 regular formed bowel movements daily.  No black or bloody stool.  She does still occasionally get rectal discomfort for which she uses lidocaine ointments.     CT abdomen pelvis without contrast showed slight asymmetric soft tissue thickening extending inferiorly from the left aspect of the anus through the subcutaneous tissues similar to scan from 9/20/2024.    Objective   Vital Signs:   /66 (BP Location: Left arm, Patient Position: Sitting, Cuff Size: Adult)   Pulse 61   Temp 97.5 °F (36.4 °C) (Temporal)   Ht 162.6 cm (64\")   Wt 79.8 kg (176 lb)   BMI 30.21 kg/m²       Physical Exam  Constitutional:       General: She is not in acute distress.     Appearance: Normal appearance.   Eyes:      General: No scleral icterus.  Pulmonary:      Effort: Pulmonary effort is normal.   Abdominal:      General: Abdomen is flat. There is no distension.      Tenderness: There is no abdominal tenderness. There is no guarding.   Genitourinary:     Comments: Unable to do rectal exam - limited d/t patient mobility   Skin:     Coloration: Skin is not jaundiced.   Neurological:      General: No focal deficit present.      Mental Status: She is alert and oriented to person, place, and time.   Psychiatric:         Mood " and Affect: Mood normal.         Behavior: Behavior normal.          Result Review :   The following data was reviewed by: Ana Cristina Zafar PA-C on 04/21/2025:  CMP          2/11/2025    05:33 2/12/2025    06:24 2/25/2025    10:08   CMP   Glucose 77  87  104    BUN 21  14  21    Creatinine 0.79  0.72  1.29    EGFR 71.6  80.0  39.8    Sodium 140  138  139    Potassium 4.8  4.6  4.9    Chloride 112  110  105    Calcium 9.5  9.7  10.7    BUN/Creatinine Ratio 26.6  19.4  16.3    Anion Gap 4.0  6.8  7.6      CBC          2/10/2025    12:39 2/10/2025    20:29 2/11/2025    05:33 2/25/2025    09:23   CBC   WBC 3.97   2.36  3.33    RBC 3.31   3.10  3.47    Hemoglobin 11.4  11.6  11.2  12.6    Hematocrit 36.1  35.7  32.3  36.0    .1   104.2  103.7    MCH 34.4   36.1  36.3    MCHC 31.6   34.7  35.0    RDW 11.8   11.7  12.7    Platelets 101   99  132              Assessment:   Diagnoses and all orders for this visit:    1. Ulcerative rectosigmoiditis without complication  -     mesalamine (CANASA) 1000 MG suppository; Insert 1 suppository into the rectum At Night As Needed (pain/bleeding).  Dispense: 30 each; Refill: 2  -     Sedimentation Rate  -     C-reactive Protein  -     lidocaine (XYLOCAINE) 5 % ointment; Apply  topically to the appropriate area as directed Daily As Needed for Mild Pain (for rectal discomfort).  Dispense: 50 g; Refill: 5          Plan:   -Continue Apriso daily  -Continue Canasa suppositories nightly  -Okay to use lidocaine as needed for intermittent rectal discomfort.  Can also use RectiCare as needed.  She would like to avoid procedures if possible  -Will check inflammatory markers.  Discussed repeating fecal calprotectin but she states she is doing well at this time and would like to hold off on stool studies for now.  Could consider at follow-up appointment        Follow Up   Return in about 3 months (around 7/21/2025).    Dragon dictation used throughout this note.         Ana Cristina Zafar  [facility]  [address] [place] [address]  Office: [phone]

## 2025-04-22 LAB
CRP SERPL-MCNC: 0.47 MG/DL (ref 0–0.5)
ERYTHROCYTE [SEDIMENTATION RATE] IN BLOOD BY WESTERGREN METHOD: 13 MM/HR (ref 0–30)

## 2025-05-29 ENCOUNTER — HOSPITAL ENCOUNTER (OUTPATIENT)
Dept: CARDIOLOGY | Facility: HOSPITAL | Age: OVER 89
Discharge: HOME | End: 2025-05-29
Payer: MEDICAID

## 2025-05-29 ENCOUNTER — RESULTS FOLLOW-UP (OUTPATIENT)
Dept: CARDIOLOGY | Facility: HOSPITAL | Age: OVER 89
End: 2025-05-29
Payer: MEDICAID

## 2025-05-29 ENCOUNTER — LAB (OUTPATIENT)
Dept: LAB | Facility: HOSPITAL | Age: OVER 89
End: 2025-05-29
Payer: MEDICAID

## 2025-05-29 VITALS
BODY MASS INDEX: 29.74 KG/M2 | OXYGEN SATURATION: 99 % | HEART RATE: 60 BPM | WEIGHT: 174.2 LBS | SYSTOLIC BLOOD PRESSURE: 95 MMHG | DIASTOLIC BLOOD PRESSURE: 58 MMHG | HEIGHT: 64 IN

## 2025-05-29 DIAGNOSIS — I50.32 CHRONIC HEART FAILURE WITH PRESERVED EJECTION FRACTION: ICD-10-CM

## 2025-05-29 DIAGNOSIS — I27.20 PULMONARY HYPERTENSION: Primary | ICD-10-CM

## 2025-05-29 DIAGNOSIS — I51.9 DYSFUNCTION OF RIGHT CARDIAC VENTRICLE: ICD-10-CM

## 2025-05-29 LAB
ANION GAP SERPL CALCULATED.3IONS-SCNC: 8.8 MMOL/L (ref 5–15)
BUN SERPL-MCNC: 23 MG/DL (ref 8–23)
BUN/CREAT SERPL: 20.4 (ref 7–25)
CALCIUM SPEC-SCNC: 10.4 MG/DL (ref 8.2–9.6)
CHLORIDE SERPL-SCNC: 103 MMOL/L (ref 98–107)
CO2 SERPL-SCNC: 24.2 MMOL/L (ref 22–29)
CREAT SERPL-MCNC: 1.13 MG/DL (ref 0.57–1)
EGFRCR SERPLBLD CKD-EPI 2021: 46.3 ML/MIN/1.73
GLUCOSE SERPL-MCNC: 92 MG/DL (ref 65–99)
POTASSIUM SERPL-SCNC: 4.7 MMOL/L (ref 3.5–5.2)
SODIUM SERPL-SCNC: 136 MMOL/L (ref 136–145)

## 2025-05-29 PROCEDURE — G0463 HOSPITAL OUTPT CLINIC VISIT: HCPCS

## 2025-05-29 PROCEDURE — 80048 BASIC METABOLIC PNL TOTAL CA: CPT

## 2025-05-29 PROCEDURE — 36415 COLL VENOUS BLD VENIPUNCTURE: CPT

## 2025-05-29 RX ORDER — FUROSEMIDE 40 MG/1
40 TABLET ORAL DAILY
Start: 2025-05-29

## 2025-05-29 NOTE — PROGRESS NOTES
[facility]    Mary Grace Culp MD  [address]  [facility] [address]  [place] [address]    Thank you for asking me to see Brittany Villeda.     Congestive Heart Failure  Pertinent negatives include no chest pain or shortness of breath.       1.  Pulmonary hypertension  2.  HFpEF    Subjective      Brittany Damian a 89 y.o. female who presents today for pulmonary hypertension, HFpEF.   The patient's most recent 2D TTE showed evidence of pulmonary hypertension and right-sided involvement.  She does have biatrial dilatation.  She was diagnosed with HUGO. She does not use a CPAP. She was diagnosed with complete heart block in the past and required placement of a PPM--Status post [employer] dual-chamber pacemaker in 2018 .  She also was diagnosed with COPD.  She does not routinely follow with pulmonary medicine.  She also was diagnosed with MGUS.      Since last visit patient had admission in December 20, 2024 with COVID-19.  She was treated with Paxlovid and was able to be discharged.    Patient had hospitalization 1/18/2025 to 1/20/2025.  Acute decompensated Heart falure Hospitalization.  She received IV Lasix for diuresis with improvement.  She was discharged on home medications-Lasix 20 mg daily, spironolactone 12.5 mg daily. Feels like this was due to not having medicaitons.     Stable exercise intolerance.  Denies any abdominal distention.    She denies any new or worsening exertional dyspnea/shortness of breath  She does have worsening leg edema and has had weight gain.  Denies any PND.         Review of Systems - Review of Systems   Constitutional: Positive for weight gain. Negative for weight loss.   Cardiovascular:  Positive for leg swelling. Negative for chest pain, dyspnea on exertion, orthopnea, paroxysmal nocturnal dyspnea and syncope.   Respiratory:  Negative for cough, shortness of breath, sleep disturbances due to breathing and snoring.    Gastrointestinal:   Negative for bloating.   Neurological:  Negative for dizziness, light-headedness and weakness.          Patient Active Problem List   Diagnosis    Non-toxic multinodular goiter    Chronic midline low back pain with right-sided sciatica    Essential hypertension    Gastroesophageal reflux disease without esophagitis    Cataract    Pulmonary hypertension    Diastolic dysfunction    HUGO (obstructive sleep apnea)    Trifascicular block    MGUS (monoclonal gammopathy of unknown significance)    Ulcerative rectosigmoiditis without complication    Lichen sclerosus    Heart block    Sleep-related hypoxia    Bradycardia    History of atrial fibrillation    Pacemaker    Paroxysmal SVT (supraventricular tachycardia)    History of chest pain    Palpitations    Paroxysmal atrial fibrillation    Ascending aortic aneurysm    Mediastinal lymphadenopathy    Anemia    Obesity (BMI 30-39.9)    Generalized weakness    Dysautonomia    Hypercalcemia    Hyperparathyroidism    Choledocholithiasis    Duodenal ulcer    Hemorrhagic gastritis    Exertional dyspnea    COPD (chronic obstructive pulmonary disease)    Osteoarthritis of glenohumeral joint    ICH (intracerebral hemorrhage)    Lower GI bleed    Thrombocytopenia    Lichen sclerosus of female genitalia    Senile atrophic vaginitis    Left inguinal hernia    Vulvar pain    Bilateral lower extremity edema    Chronic heart failure with preserved ejection fraction    Acute renal insufficiency    Dysfunction of right cardiac ventricle    COVID-19 virus infection    Shortness of breath    Acute lower GI bleeding     family history includes Breast cancer in her sister; Diabetes in her mother; Heart disease in her sister; Kidney cancer in her sister; Prostate cancer in her brother, brother, and brother.   reports that she quit smoking about 60 years ago. Her smoking use included cigarettes. She started smoking about 72 years ago. She has a 18 pack-year smoking history. She has been exposed to  tobacco smoke. She has never used smokeless tobacco. She reports that she does not drink alcohol and does not use drugs.  Allergies   Allergen Reactions    Codeine Hallucinations     Tolerates hydromorphone    Amitriptyline Rash    Amoxicillin-Pot Clavulanate Rash    Aspirin Unknown - Low Severity     Patient doesn't know why    Carisoprodol-Aspirin-Codeine Palpitations    Iodinated Contrast Media Rash    Latex Rash    Naproxen Rash    Nsaids Unknown - Low Severity     UNSURE OF REACTION    Soma Compound With Codeine [Carisoprodol-Aspirin-Codeine] Rash    Sulfa Antibiotics Rash    Tramadol Palpitations     heart races      Physical Activity: Sufficiently Active (4/26/2024)    Exercise Vital Sign     Days of Exercise per Week: 5 days     Minutes of Exercise per Session: 30 min          Current Outpatient Medications:     acetaminophen (TYLENOL) 500 MG tablet, Take 1 tablet by mouth 2 (Two) Times a Day., Disp: , Rfl:     docusate sodium (COLACE) 100 MG capsule, Take 1 capsule by mouth Daily., Disp: , Rfl:     ELDERBERRY PO, Take 2 tablets by mouth Daily., Disp: , Rfl:     furosemide (LASIX) 40 MG tablet, Take 1 tablet by mouth Daily. Taking 20mg, Disp: , Rfl:     lidocaine (XYLOCAINE) 5 % ointment, Apply  topically to the appropriate area as directed Daily As Needed for Mild Pain (for rectal discomfort)., Disp: 50 g, Rfl: 5    Magnesium Oxide -Mg Supplement 400 (240 Mg) MG tablet, TAKE 1 TABLET BY MOUTH DAILY, Disp: 90 tablet, Rfl: 1    mesalamine (APRISO) 0.375 g 24 hr capsule, TAKE FOUR CAPSULES BY MOUTH DAILY, Disp: 360 capsule, Rfl: 2    mesalamine (CANASA) 1000 MG suppository, Insert 1 suppository into the rectum At Night As Needed (pain/bleeding)., Disp: 30 each, Rfl: 2    pantoprazole (PROTONIX) 40 MG EC tablet, TAKE 1 TABLET BY MOUTH DAILY, Disp: 90 tablet, Rfl: 1    spironolactone (ALDACTONE) 25 MG tablet, Take 1 tablet by mouth Every Other Day. (Patient taking differently: Take 0.5 tablets by mouth Daily.),  Disp: 45 tablet, Rfl: 3    tacrolimus (PROTOPIC) 0.1 % ointment, , Disp: , Rfl:     valACYclovir (VALTREX) 1000 MG tablet, Take 1 tablet by mouth Daily., Disp: , Rfl:     vitamin B-12 (CYANOCOBALAMIN) 1000 MCG tablet, TAKE 1 TABLET BY MOUTH DAILY, Disp: 90 tablet, Rfl: 1    Objective   Vital Sign Review:   Vitals:    05/29/25 1057   BP: 95/58   Pulse: 60   SpO2: 99%       Body mass index is 29.89 kg/m².      05/29/25  1057   Weight: 79 kg (174 lb 3.2 oz) (at home this morning)         Physical Exam:  Constitutional:       Appearance: Normal and healthy appearance.   Neck:      Vascular: No carotid bruit or JVD. JVD normal.   Pulmonary:      Effort: Pulmonary effort is normal.      Breath sounds: Normal breath sounds.   Cardiovascular:      PMI at left midclavicular line. Normal rate. Regular rhythm.   Pulses:     Intact distal pulses.   Edema:     Peripheral edema present.     Pretibial: bilateral 2+ edema of the pretibial area.     Ankle: bilateral 2+ edema of the ankle.  Abdominal:      Palpations: Abdomen is soft.      Tenderness: There is no abdominal tenderness.   Skin:     General: Skin is warm and dry.   Neurological:      General: No focal deficit present.      Mental Status: Alert and oriented to person, place and time.   Psychiatric:         Behavior: Behavior is cooperative.          DATA REVIEWED:   EKG:  ECG 12 Lead     Date/Time: 8/12/2024 1:25 PM  Performed by: Sydney Gilliam APRN     Authorized by: Sydney Gilliam APRN  Comparison: compared with previous ECG   Similar to previous ECG  Rhythm: atrial fibrillation and paced  Rate: normal  BPM: 67  Conduction: right bundle branch block  QRS axis: normal  Comments: Similar to prior    ---------------------------------------------------  ECHO:    Results for orders placed during the hospital encounter of 04/23/24    Adult Transthoracic Echo Complete W/ Cont if Necessary Per Protocol    Interpretation Summary    Left ventricular systolic function  is normal. Left ventricular ejection fraction appears to be 61 - 65%.    Left ventricular diastolic function was indeterminate.    The right ventricular cavity is mildly dilated. Normal right ventricular systolic function noted.    The left atrial cavity is severely dilated.    The right atrial cavity is severely dilated.    Saline test results are negative.    Mild aortic valve regurgitation is present.    Mild mitral valve regurgitation is present.    Mild to moderate tricuspid valve regurgitation is present.    Calculated right ventricular systolic pressure from tricuspid regurgitation is 38 mmHg.    There is no evidence of pericardial effusion.          -----------------------------------------------------  RHC/LHC    No results found for this or any previous visit.      ---------------------------------------------------------------------------    CT:   CTA chest  FINDINGS:  1. There is no convincing evidence for pulmonary thromboemboli. Again  seen is heterogeneous admixture of contrast of the pulmonary arteries at  the right lower lobe, but no pulmonary thromboemboli are seen. Pulmonary  arteries are enlarged with the main pulmonary artery measuring 3.8 cm in  diameter, pulmonary arterial hypertension.     The thoracic aorta is ectatic at 4.0 cm, stable when measured along the  same planes. The diameter tapers to 3 cm at the aortic arch.     2. Stable marked elevation of the right hemidiaphragm with significant  compressive atelectasis at the right lower lobe. No new airspace  consolidations are seen suspicious for pneumonia. There are no pleural  or pericardial effusions. There is cardiomegaly and findings of  right-sided cardiac insufficiency, unchanged.     3. Enlarged mediastinal nodes are stable and extensive multinodular  goiter appears stable, although not seen in its entirety. No new  abnormality seen at the visualized upper abdomen, multiple renal cysts  are  noted.      --------------------------------------------------------------------------------------------------------------    Laboratory evaluations:  Renal Function: CrCl cannot be calculated (Patient's most recent lab result is older than the maximum 30 days allowed.).    Lab Results   Component Value Date    GLUCOSE 104 (H) 02/25/2025    BUN 21 02/25/2025    CREATININE 1.29 (H) 02/25/2025    EGFRIFNONA 77 07/29/2021    EGFRIFAFRI 115 01/24/2022    BCR 16.3 02/25/2025    K 4.9 02/25/2025    CO2 26.4 02/25/2025    CALCIUM 10.7 (H) 02/25/2025    ALBUMIN 3.7 02/10/2025    AST 13 02/10/2025    ALT <5 02/10/2025     Lab Results   Component Value Date    WBC 3.33 (L) 02/25/2025    HGB 12.6 02/25/2025    HCT 36.0 02/25/2025    .7 (H) 02/25/2025     (L) 02/25/2025     Lab Results   Component Value Date    HGBA1C 5.4 04/25/2022     Lab Results   Component Value Date    HGBA1C 5.4 04/25/2022    HGBA1C 4.90 06/23/2021    HGBA1C 5.30 05/11/2021     Lab Results   Component Value Date    CREATININE 1.29 (H) 02/25/2025     Lab Results   Component Value Date    IRON 106 02/25/2025    TIBC 338 02/25/2025    FERRITIN 122.00 02/25/2025       Result Review:  I have personally reviewed the results from the time of this admission to 5/29/2025 11:31 EDT and agree with these findings:  [x]  Laboratory list / accordion  []  Microbiology  [x]  Radiology  [x]  EKG/Telemetry   [x]  Cardiology/Vascular   []  Pathology  [x]  Old records  []  Other:          ReDS VOLUMETRIC ASSESSMENT:  UTO      Assessment & Plan      1. Pulmonary hypertension    2. Chronic heart failure with preserved ejection fraction    3. Dysfunction of right cardiac ventricle      Pulmonary hypertension-she has HFpEF and HUGO. we will focus on GDMT for heart failure.  PFTs showed Mild restrictive lung disease with a moderate diffusion deficit.   Patient will follow-up with Dr Win for PH management--has not been indicated to have PAH specific medications.      HFpEF.  4/2024 LVEF 61 to 65%  Today she appears slightly hypervolemic.  She has worsening leg edema and weight gain, but denies any shortness of breath or dyspnea on exertion.  Denies orthopnea..    Labs reviewed from 2/25/2025 creatinine 1.29, GFR 39.8, sodium 139, potassium 4.9---Creatinine is a little elevated, but creatinine has varied over the past year from 0.7-1.32. Also patient was fasting this morning  We will check BMP today, I will have patient increase Lasix to 40 mg daily, will reevaluate on Monday 6/2 with follow-up visit and possible need for IV diuretics at that time      3. Right ventricular dysfunction--would like to keep patient on spironolactone --she was on 12.5mg daily, but cutting the tablet in half has become too difficult, she will start taking a whole tablet(25mg) every other day.     NYHA stage C FC-III     Clinical status was assessed and has remained stable.  Treatment intent: curative   ReDS reading was not obtained today.  ReDS result: UTO       Today, Patient appears euvolemic. and with perfusion. The patient's hemodynamics are currently acceptable. HR is: normal and is at goal. BP/MAP was reviewed and there is not room for medication up-titration.  The patient's clinical course has been impacted by Arrythmia-persistent afib, untreated HUGO. LVEF: 61-65%.     GDMT Assessment:     GDMT changes recommended today: No changes to medication therapy.      BB:   Rate controlled without BB--possible future addition as patient has pacemaker if needed  Monitor heart rate.  Please call the [facility] for HR<50, dizziness, lightheadedness, syncope     A/A/A:   Hemodynamics have not previously allow--recent issues with hyperkalemia.  Patient was taking extra potassium supplement     [id number]:  Not a candidate due to having severe vaginal yeast infections.       MRA:  The patient is FC-NYHA Class III and MRA is indicated.   continue Spironolactone to 25mg every other day  Quarterly GFR/electrolyte  assessment          Diuretic Regimen:  -DIURETIC:   furosemide (LASIX) 20 mg every day----increase to 40 mg daily until follow-up on 6/2/25  -Fluid restriction:   -requested  -2000 ml  -Patient has been asked to weigh daily and was provided with a printed diuretic strategy.  -Sodium restriction:   -1,500 mg Na restriction was discussed.      Labs/Diagnostics/Referrals:    Labs -Chem-7       Lifestyle Advice:   - call office if I gain more than 2 pounds in one day or 5 pounds in one week   - keep legs up while sitting   - use salt in moderation   - watch for swelling in feet, ankles and legs every day   - weigh myself daily   -call for concerning s/sx   -- activity or exercise based on tolerance encouraged     CODE STATUS: FULL              Return in about 4 days (around 6/2/2025).  F/u as scheduled            Thank you for allowing me to participate in the care of your patient,         Brooke Clay, APRCHRISTIAN  [facility]  05/29/25  09:12 EDT      **Deon Disclaimer:**  Much of this encounter note is an electronic transcription/translation of spoken language to printed text. The electronic translation of spoken language may permit erroneous, or at times, nonsensical words or phrases to be inadvertently transcribed. Although I have reviewed the note for such errors, some may still exist.    The information in this note was reviewed and updated during the visit to be as accurate as possible. As many patients have chronic medical problems, many of their individual problems and ongoing plans do not change significantly from visit to visit.  This information is correct and is consistent with my treatment plan as of today's visit.

## 2025-05-29 NOTE — PATIENT INSTRUCTIONS
Increase lasix to 1 tablet daily until follow-up on Monday.     Directions for when to call the clinic reviewed with the patient to include weight gain of 2 to 3 pounds in 24 hours, weight gain of 5 to 10 pounds within 7 days; worsening shortness of breath; worsening lower extremity edema or abdominal distention.

## 2025-05-29 NOTE — ADDENDUM NOTE
Encounter addended by: Jessenia Knight MA on: 5/29/2025 12:15 PM   Actions taken: Charge Capture section accepted

## 2025-05-29 NOTE — LETTER
May 29, 2025     Mary Grace Culp MD  [address] [place] [id number]    Patient: Brittany Villeda   YOB: 1935   Date of Visit: 5/29/2025     Dear Mary Grace Culp MD:       Thank you for referring Brittany Villeda to me for evaluation. Below are the relevant portions of my assessment and plan of care.    If you have questions, please do not hesitate to call me. I look forward to following Brittany along with you.         Sincerely,        SHAHID Bazan        CC: MD Obed Calvert, SHAHID Holly  05/29/25 1133  Signed    [facility]    Mary Grace Culp MD  [address] [id number]    Thank you for asking me to see Brittany Villeda.     Congestive Heart Failure  Pertinent negatives include no chest pain or shortness of breath.       1.  Pulmonary hypertension  2.  HFpEF    Subjective     Brittany Cliftons a 89 y.o. female who presents today for pulmonary hypertension, HFpEF.   The patient's most recent 2D TTE showed evidence of pulmonary hypertension and right-sided involvement.  She does have biatrial dilatation.  She was diagnosed with HUGO. She does not use a CPAP. She was diagnosed with complete heart block in the past and required placement of a PPM--Status post [employer] dual-chamber pacemaker in 2018 .  She also was diagnosed with COPD.  She does not routinely follow with pulmonary medicine.  She also was diagnosed with MGUS.      Since last visit patient had admission in December 20, 2024 with COVID-19.  She was treated with Paxlovid and was able to be discharged.    Patient had hospitalization 1/18/2025 to 1/20/2025.  Acute decompensated Heart falure Hospitalization.  She received IV Lasix for diuresis with improvement.  She was discharged on home medications-Lasix 20 mg daily, spironolactone 12.5 mg daily. Feels like this was due to not having medicaitons.     Stable exercise intolerance.   Denies any abdominal distention.    She denies any new or worsening exertional dyspnea/shortness of breath  She does have worsening leg edema and has had weight gain.  Denies any PND.         Review of Systems - Review of Systems   Constitutional: Positive for weight gain. Negative for weight loss.   Cardiovascular:  Positive for leg swelling. Negative for chest pain, dyspnea on exertion, orthopnea, paroxysmal nocturnal dyspnea and syncope.   Respiratory:  Negative for cough, shortness of breath, sleep disturbances due to breathing and snoring.    Gastrointestinal:  Negative for bloating.   Neurological:  Negative for dizziness, light-headedness and weakness.          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-sided sciatica   • Essential hypertension   • Gastroesophageal reflux disease without esophagitis   • Cataract   • Pulmonary hypertension   • Diastolic dysfunction   • HUGO (obstructive sleep apnea)   • Trifascicular block   • MGUS (monoclonal gammopathy of unknown significance)   • Ulcerative rectosigmoiditis without complication   • Lichen sclerosus   • Heart block   • Sleep-related hypoxia   • Bradycardia   • History of atrial fibrillation   • Pacemaker   • Paroxysmal SVT (supraventricular tachycardia)   • History of chest pain   • Palpitations   • Paroxysmal atrial fibrillation   • Ascending aortic aneurysm   • Mediastinal lymphadenopathy   • Anemia   • Obesity (BMI 30-39.9)   • Generalized weakness   • Dysautonomia   • Hypercalcemia   • Hyperparathyroidism   • Choledocholithiasis   • Duodenal ulcer   • Hemorrhagic gastritis   • Exertional dyspnea   • COPD (chronic obstructive pulmonary disease)   • Osteoarthritis of glenohumeral joint   • ICH (intracerebral hemorrhage)   • Lower GI bleed   • Thrombocytopenia   • Lichen sclerosus of female genitalia   • Senile atrophic vaginitis   • Left inguinal hernia   • Vulvar pain   • Bilateral  lower extremity edema   • Chronic heart failure with preserved ejection fraction   • Acute renal insufficiency   • Dysfunction of right cardiac ventricle   • COVID-19 virus infection   • Shortness of breath   • Acute lower GI bleeding     family history includes Breast cancer in her sister; Diabetes in her mother; Heart disease in her sister; Kidney cancer in her sister; Prostate cancer in her brother, brother, and brother.   reports that she quit smoking about 60 years ago. Her smoking use included cigarettes. She started smoking about 72 years ago. She has a 18 pack-year smoking history. She has been exposed to tobacco smoke. She has never used smokeless tobacco. She reports that she does not drink alcohol and does not use drugs.  Allergies   Allergen Reactions   • Codeine Hallucinations     Tolerates hydromorphone   • Amitriptyline Rash   • Amoxicillin-Pot Clavulanate Rash   • Aspirin Unknown - Low Severity     Patient doesn't know why   • Carisoprodol-Aspirin-Codeine Palpitations   • Iodinated Contrast Media Rash   • Latex Rash   • Naproxen Rash   • Nsaids Unknown - Low Severity     UNSURE OF REACTION   • Soma Compound With Codeine [Carisoprodol-Aspirin-Codeine] Rash   • Sulfa Antibiotics Rash   • Tramadol Palpitations     heart races      Physical Activity: Sufficiently Active (4/26/2024)    Exercise Vital Sign    • Days of Exercise per Week: 5 days    • Minutes of Exercise per Session: 30 min          Current Outpatient Medications:   •  acetaminophen (TYLENOL) 500 MG tablet, Take 1 tablet by mouth 2 (Two) Times a Day., Disp: , Rfl:   •  docusate sodium (COLACE) 100 MG capsule, Take 1 capsule by mouth Daily., Disp: , Rfl:   •  ELDERBERRY PO, Take 2 tablets by mouth Daily., Disp: , Rfl:   •  furosemide (LASIX) 40 MG tablet, Take 1 tablet by mouth Daily. Taking 20mg, Disp: , Rfl:   •  lidocaine (XYLOCAINE) 5 % ointment, Apply  topically to the appropriate area as directed Daily As Needed for Mild Pain (for  rectal discomfort)., Disp: 50 g, Rfl: 5  •  Magnesium Oxide -Mg Supplement 400 (240 Mg) MG tablet, TAKE 1 TABLET BY MOUTH DAILY, Disp: 90 tablet, Rfl: 1  •  mesalamine (APRISO) 0.375 g 24 hr capsule, TAKE FOUR CAPSULES BY MOUTH DAILY, Disp: 360 capsule, Rfl: 2  •  mesalamine (CANASA) 1000 MG suppository, Insert 1 suppository into the rectum At Night As Needed (pain/bleeding)., Disp: 30 each, Rfl: 2  •  pantoprazole (PROTONIX) 40 MG EC tablet, TAKE 1 TABLET BY MOUTH DAILY, Disp: 90 tablet, Rfl: 1  •  spironolactone (ALDACTONE) 25 MG tablet, Take 1 tablet by mouth Every Other Day. (Patient taking differently: Take 0.5 tablets by mouth Daily.), Disp: 45 tablet, Rfl: 3  •  tacrolimus (PROTOPIC) 0.1 % ointment, , Disp: , Rfl:   •  valACYclovir (VALTREX) 1000 MG tablet, Take 1 tablet by mouth Daily., Disp: , Rfl:   •  vitamin B-12 (CYANOCOBALAMIN) 1000 MCG tablet, TAKE 1 TABLET BY MOUTH DAILY, Disp: 90 tablet, Rfl: 1    Objective  Vital Sign Review:   Vitals:    05/29/25 1057   BP: 95/58   Pulse: 60   SpO2: 99%       Body mass index is 29.89 kg/m².      05/29/25  1057   Weight: 79 kg (174 lb 3.2 oz) (at home this morning)         Physical Exam:  Constitutional:       Appearance: Normal and healthy appearance.   Neck:      Vascular: No carotid bruit or JVD. JVD normal.   Pulmonary:      Effort: Pulmonary effort is normal.      Breath sounds: Normal breath sounds.   Cardiovascular:      PMI at left midclavicular line. Normal rate. Regular rhythm.   Pulses:     Intact distal pulses.   Edema:     Peripheral edema present.     Pretibial: bilateral 2+ edema of the pretibial area.     Ankle: bilateral 2+ edema of the ankle.  Abdominal:      Palpations: Abdomen is soft.      Tenderness: There is no abdominal tenderness.   Skin:     General: Skin is warm and dry.   Neurological:      General: No focal deficit present.      Mental Status: Alert and oriented to person, place and time.   Psychiatric:         Behavior: Behavior is  cooperative.          DATA REVIEWED:   EKG:  ECG 12 Lead     Date/Time: 8/12/2024 1:25 PM  Performed by: Sydney Gilliam APRN     Authorized by: Sydney Gilliam APRN  Comparison: compared with previous ECG   Similar to previous ECG  Rhythm: atrial fibrillation and paced  Rate: normal  BPM: 67  Conduction: right bundle branch block  QRS axis: normal  Comments: Similar to prior    ---------------------------------------------------  ECHO:    Results for orders placed during the hospital encounter of 04/23/24    Adult Transthoracic Echo Complete W/ Cont if Necessary Per Protocol    Interpretation Summary  •  Left ventricular systolic function is normal. Left ventricular ejection fraction appears to be 61 - 65%.  •  Left ventricular diastolic function was indeterminate.  •  The right ventricular cavity is mildly dilated. Normal right ventricular systolic function noted.  •  The left atrial cavity is severely dilated.  •  The right atrial cavity is severely dilated.  •  Saline test results are negative.  •  Mild aortic valve regurgitation is present.  •  Mild mitral valve regurgitation is present.  •  Mild to moderate tricuspid valve regurgitation is present.  •  Calculated right ventricular systolic pressure from tricuspid regurgitation is 38 mmHg.  •  There is no evidence of pericardial effusion.          -----------------------------------------------------  RHC/LHC    No results found for this or any previous visit.      ---------------------------------------------------------------------------    CT:   CTA chest  FINDINGS:  1. There is no convincing evidence for pulmonary thromboemboli. Again  seen is heterogeneous admixture of contrast of the pulmonary arteries at  the right lower lobe, but no pulmonary thromboemboli are seen. Pulmonary  arteries are enlarged with the main pulmonary artery measuring 3.8 cm in  diameter, pulmonary arterial hypertension.     The thoracic aorta is ectatic at 4.0 cm, stable  when measured along the  same planes. The diameter tapers to 3 cm at the aortic arch.     2. Stable marked elevation of the right hemidiaphragm with significant  compressive atelectasis at the right lower lobe. No new airspace  consolidations are seen suspicious for pneumonia. There are no pleural  or pericardial effusions. There is cardiomegaly and findings of  right-sided cardiac insufficiency, unchanged.     3. Enlarged mediastinal nodes are stable and extensive multinodular  goiter appears stable, although not seen in its entirety. No new  abnormality seen at the visualized upper abdomen, multiple renal cysts  are noted.      --------------------------------------------------------------------------------------------------------------    Laboratory evaluations:  Renal Function: CrCl cannot be calculated (Patient's most recent lab result is older than the maximum 30 days allowed.).    Lab Results   Component Value Date    GLUCOSE 104 (H) 02/25/2025    BUN 21 02/25/2025    CREATININE 1.29 (H) 02/25/2025    EGFRIFNONA 77 07/29/2021    EGFRIFAFRI 115 01/24/2022    BCR 16.3 02/25/2025    K 4.9 02/25/2025    CO2 26.4 02/25/2025    CALCIUM 10.7 (H) 02/25/2025    ALBUMIN 3.7 02/10/2025    AST 13 02/10/2025    ALT <5 02/10/2025     Lab Results   Component Value Date    WBC 3.33 (L) 02/25/2025    HGB 12.6 02/25/2025    HCT 36.0 02/25/2025    .7 (H) 02/25/2025     (L) 02/25/2025     Lab Results   Component Value Date    HGBA1C 5.4 04/25/2022     Lab Results   Component Value Date    HGBA1C 5.4 04/25/2022    HGBA1C 4.90 06/23/2021    HGBA1C 5.30 05/11/2021     Lab Results   Component Value Date    CREATININE 1.29 (H) 02/25/2025     Lab Results   Component Value Date    IRON 106 02/25/2025    TIBC 338 02/25/2025    FERRITIN 122.00 02/25/2025       Result Review:  I have personally reviewed the results from the time of this admission to 5/29/2025 11:31 EDT and agree with these findings:  [x]  Laboratory list /  accordion  []  Microbiology  [x]  Radiology  [x]  EKG/Telemetry   [x]  Cardiology/Vascular   []  Pathology  [x]  Old records  []  Other:          ReDS VOLUMETRIC ASSESSMENT:  UTO      Assessment & Plan     1. Pulmonary hypertension    2. Chronic heart failure with preserved ejection fraction    3. Dysfunction of right cardiac ventricle      Pulmonary hypertension-she has HFpEF and HUGO. we will focus on GDMT for heart failure.  PFTs showed Mild restrictive lung disease with a moderate diffusion deficit.   Patient will follow-up with Dr Win for PH management--has not been indicated to have PAH specific medications.     HFpEF.  4/2024 LVEF 61 to 65%  Today she appears slightly hypervolemic.  She has worsening leg edema and weight gain, but denies any shortness of breath or dyspnea on exertion.  Denies orthopnea..    Labs reviewed from 2/25/2025 creatinine 1.29, GFR 39.8, sodium 139, potassium 4.9---Creatinine is a little elevated, but creatinine has varied over the past year from 0.7-1.32. Also patient was fasting this morning  We will check BMP today, I will have patient increase Lasix to 40 mg daily, will reevaluate on Monday 6/2 with follow-up visit and possible need for IV diuretics at that time      3. Right ventricular dysfunction--would like to keep patient on spironolactone --she was on 12.5mg daily, but cutting the tablet in half has become too difficult, she will start taking a whole tablet(25mg) every other day.     NYHA stage C FC-III     Clinical status was assessed and has remained stable.  Treatment intent: curative   ReDS reading was not obtained today.  ReDS result: UTO       Today, Patient appears euvolemic. and with perfusion. The patient's hemodynamics are currently acceptable. HR is: normal and is at goal. BP/MAP was reviewed and there is not room for medication up-titration.  The patient's clinical course has been impacted by Arrythmia-persistent afib, untreated HUGO. LVEF: 61-65%.     GDMT  Assessment:     GDMT changes recommended today: No changes to medication therapy.      BB:   Rate controlled without BB--possible future addition as patient has pacemaker if needed  Monitor heart rate.  Please call the [facility] for HR<50, dizziness, lightheadedness, syncope     A/A/A:   Hemodynamics have not previously allow--recent issues with hyperkalemia.  Patient was taking extra potassium supplement     [id number]:  Not a candidate due to having severe vaginal yeast infections.       MRA:  The patient is FC-NYHA Class III and MRA is indicated.   continue Spironolactone to 25mg every other day  Quarterly GFR/electrolyte assessment          Diuretic Regimen:  -DIURETIC:   furosemide (LASIX) 20 mg every day----increase to 40 mg daily until follow-up on 6/2/25  -Fluid restriction:   -requested  -2000 ml  -Patient has been asked to weigh daily and was provided with a printed diuretic strategy.  -Sodium restriction:   -1,500 mg Na restriction was discussed.      Labs/Diagnostics/Referrals:    Labs -Chem-7       Lifestyle Advice:   - call office if I gain more than 2 pounds in one day or 5 pounds in one week   - keep legs up while sitting   - use salt in moderation   - watch for swelling in feet, ankles and legs every day   - weigh myself daily   -call for concerning s/sx   -- activity or exercise based on tolerance encouraged     CODE STATUS: FULL              Return in about 4 days (around 6/2/2025).  F/u as scheduled            Thank you for allowing me to participate in the care of your patient,         SHAHID Bazan  \[facility] [place] [facility]\"" HEART FAILURE  05/29/25  09:12 EDT      **Deon Disclaimer:**  Much of this encounter note is an electronic transcription/translation of spoken language to printed text. The electronic translation of spoken language may permit erroneous, or at times, nonsensical words or phrases to be inadvertently transcribed. Although I have reviewed the note for such errors, some may still  exist.    The information in this note was reviewed and updated during the visit to be as accurate as possible. As many patients have chronic medical problems, many of their individual problems and ongoing plans do not change significantly from visit to visit.  This information is correct and is consistent with my treatment plan as of today's visit.

## 2025-06-02 ENCOUNTER — HOSPITAL ENCOUNTER (OUTPATIENT)
Dept: CARDIOLOGY | Facility: HOSPITAL | Age: OVER 89
Discharge: HOME | End: 2025-06-02
Admitting: NURSE PRACTITIONER
Payer: MEDICAID

## 2025-06-02 VITALS
SYSTOLIC BLOOD PRESSURE: 133 MMHG | WEIGHT: 172 LBS | HEIGHT: 64 IN | OXYGEN SATURATION: 96 % | BODY MASS INDEX: 29.37 KG/M2 | DIASTOLIC BLOOD PRESSURE: 72 MMHG | HEART RATE: 61 BPM

## 2025-06-02 DIAGNOSIS — I27.20 PULMONARY HYPERTENSION: ICD-10-CM

## 2025-06-02 DIAGNOSIS — R60.0 BILATERAL LEG EDEMA: ICD-10-CM

## 2025-06-02 DIAGNOSIS — I51.9 DYSFUNCTION OF RIGHT CARDIAC VENTRICLE: ICD-10-CM

## 2025-06-02 DIAGNOSIS — I50.32 CHRONIC HEART FAILURE WITH PRESERVED EJECTION FRACTION: Primary | ICD-10-CM

## 2025-06-02 LAB
ANION GAP SERPL CALCULATED.3IONS-SCNC: 9 MMOL/L (ref 5–15)
BUN SERPL-MCNC: 26 MG/DL (ref 8–23)
BUN/CREAT SERPL: 23.2 (ref 7–25)
CALCIUM SPEC-SCNC: 11.1 MG/DL (ref 8.2–9.6)
CHLORIDE SERPL-SCNC: 102 MMOL/L (ref 98–107)
CO2 SERPL-SCNC: 27 MMOL/L (ref 22–29)
CREAT SERPL-MCNC: 1.12 MG/DL (ref 0.57–1)
EGFRCR SERPLBLD CKD-EPI 2021: 46.8 ML/MIN/1.73
GLUCOSE SERPL-MCNC: 96 MG/DL (ref 65–99)
NT-PROBNP SERPL-MCNC: 910 PG/ML (ref 0–1800)
POTASSIUM SERPL-SCNC: 5 MMOL/L (ref 3.5–5.2)
SODIUM SERPL-SCNC: 138 MMOL/L (ref 136–145)

## 2025-06-02 PROCEDURE — 83880 ASSAY OF NATRIURETIC PEPTIDE: CPT | Performed by: NURSE PRACTITIONER

## 2025-06-02 PROCEDURE — G0463 HOSPITAL OUTPT CLINIC VISIT: HCPCS

## 2025-06-02 PROCEDURE — 80048 BASIC METABOLIC PNL TOTAL CA: CPT | Performed by: NURSE PRACTITIONER

## 2025-06-02 PROCEDURE — 99214 OFFICE O/P EST MOD 30 MIN: CPT | Performed by: NURSE PRACTITIONER

## 2025-06-02 NOTE — PATIENT INSTRUCTIONS
We will continue same dose of lasix for now. Checking labs today and we will repeat labs in 2 weeks. We will follow-up in 1 month

## 2025-06-02 NOTE — LETTER
June 2, 2025     Mary Grace Culp MD  [address]  [address]  [facility] [address]    Patient: Brittany Villeda   YOB: 1935   Date of Visit: 6/2/2025     Dear Mary Grace Culp MD:       Thank you for referring Brittany Villeda to me for evaluation. Below are the relevant portions of my assessment and plan of care.    If you have questions, please do not hesitate to call me. I look forward to following Brittany along with you.         Sincerely,        SHAHID Bazan        CC: MD Obed Calvert, SHAHID Holly  06/02/25 1526  Signed    [facility]    Provider, No Known  [facility] [place] [address]    Thank you for asking me to see Brittany Villeda.     Congestive Heart Failure  Pertinent negatives include no chest pain or shortness of breath.       1.  Pulmonary hypertension  2.  HFpEF    Subjective     Brittany Cliftons a 89 y.o. female who presents today for pulmonary hypertension, HFpEF.   The patient's most recent 2D TTE showed evidence of pulmonary hypertension and right-sided involvement.  She does have biatrial dilatation.  She was diagnosed with HUGO. She does not use a CPAP. She was diagnosed with complete heart block in the past and required placement of a PPM--Status post [employer] dual-chamber pacemaker in 2018 .  She also was diagnosed with COPD.  She does not routinely follow with pulmonary medicine.  She also was diagnosed with MGUS.      Since last visit patient had admission in December 20, 2024 with COVID-19.  She was treated with Paxlovid and was able to be discharged.    Patient had hospitalization 1/18/2025 to 1/20/2025.  Acute decompensated Heart falure Hospitalization.  She received IV Lasix for diuresis with improvement.  She was discharged on home medications-Lasix 20 mg daily, spironolactone 12.5 mg daily. Feels like this was due to not having medicaitons.     Stable exercise intolerance.   Denies any abdominal distention.    She denies any new or worsening exertional dyspnea/shortness of breath  At last visit due to increasing leg edema and weight her diuretic was increased.  Denies any PND.   Reports no change in leg edema, did lose 2 pounds since last visit.      Review of Systems - Review of Systems   Constitutional: Positive for weight gain. Negative for weight loss.   Cardiovascular:  Positive for leg swelling. Negative for chest pain, dyspnea on exertion, orthopnea, paroxysmal nocturnal dyspnea and syncope.   Respiratory:  Negative for cough, shortness of breath, sleep disturbances due to breathing and snoring.    Gastrointestinal:  Negative for bloating.   Neurological:  Negative for dizziness, light-headedness and weakness.          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-sided sciatica   • Essential hypertension   • Gastroesophageal reflux disease without esophagitis   • Cataract   • Pulmonary hypertension   • Diastolic dysfunction   • HUGO (obstructive sleep apnea)   • Trifascicular block   • MGUS (monoclonal gammopathy of unknown significance)   • Ulcerative rectosigmoiditis without complication   • Lichen sclerosus   • Heart block   • Sleep-related hypoxia   • Bradycardia   • History of atrial fibrillation   • Pacemaker   • Paroxysmal SVT (supraventricular tachycardia)   • History of chest pain   • Palpitations   • Paroxysmal atrial fibrillation   • Ascending aortic aneurysm   • Mediastinal lymphadenopathy   • Anemia   • Obesity (BMI 30-39.9)   • Generalized weakness   • Dysautonomia   • Hypercalcemia   • Hyperparathyroidism   • Choledocholithiasis   • Duodenal ulcer   • Hemorrhagic gastritis   • Exertional dyspnea   • COPD (chronic obstructive pulmonary disease)   • Osteoarthritis of glenohumeral joint   • ICH (intracerebral hemorrhage)   • Lower GI bleed   • Thrombocytopenia   • Lichen sclerosus of female  genitalia   • Senile atrophic vaginitis   • Left inguinal hernia   • Vulvar pain   • Bilateral lower extremity edema   • Chronic heart failure with preserved ejection fraction   • Acute renal insufficiency   • Dysfunction of right cardiac ventricle   • COVID-19 virus infection   • Shortness of breath   • Acute lower GI bleeding     family history includes Breast cancer in her sister; Diabetes in her mother; Heart disease in her sister; Kidney cancer in her sister; Prostate cancer in her brother, brother, and brother.   reports that she quit smoking about 60 years ago. Her smoking use included cigarettes. She started smoking about 72 years ago. She has a 18 pack-year smoking history. She has been exposed to tobacco smoke. She has never used smokeless tobacco. She reports that she does not drink alcohol and does not use drugs.  Allergies   Allergen Reactions   • Codeine Hallucinations     Tolerates hydromorphone   • Amitriptyline Rash   • Amoxicillin-Pot Clavulanate Rash   • Aspirin Unknown - Low Severity     Patient doesn't know why   • Carisoprodol-Aspirin-Codeine Palpitations   • Iodinated Contrast Media Rash   • Latex Rash   • Naproxen Rash   • Nsaids Unknown - Low Severity     UNSURE OF REACTION   • Soma Compound With Codeine [Carisoprodol-Aspirin-Codeine] Rash   • Sulfa Antibiotics Rash   • Tramadol Palpitations     heart races      Physical Activity: Sufficiently Active (4/26/2024)    Exercise Vital Sign    • Days of Exercise per Week: 5 days    • Minutes of Exercise per Session: 30 min          Current Outpatient Medications:   •  acetaminophen (TYLENOL) 500 MG tablet, Take 1 tablet by mouth 2 (Two) Times a Day., Disp: , Rfl:   •  docusate sodium (COLACE) 100 MG capsule, Take 1 capsule by mouth Daily., Disp: , Rfl:   •  ELDERBERRY PO, Take 2 tablets by mouth Daily., Disp: , Rfl:   •  furosemide (LASIX) 40 MG tablet, Take 1 tablet by mouth Daily. Taking 20mg, Disp: , Rfl:   •  lidocaine (XYLOCAINE) 5 %  ointment, Apply  topically to the appropriate area as directed Daily As Needed for Mild Pain (for rectal discomfort)., Disp: 50 g, Rfl: 5  •  Magnesium Oxide -Mg Supplement 400 (240 Mg) MG tablet, TAKE 1 TABLET BY MOUTH DAILY, Disp: 90 tablet, Rfl: 1  •  mesalamine (APRISO) 0.375 g 24 hr capsule, TAKE FOUR CAPSULES BY MOUTH DAILY, Disp: 360 capsule, Rfl: 2  •  mesalamine (CANASA) 1000 MG suppository, Insert 1 suppository into the rectum At Night As Needed (pain/bleeding)., Disp: 30 each, Rfl: 2  •  pantoprazole (PROTONIX) 40 MG EC tablet, TAKE 1 TABLET BY MOUTH DAILY, Disp: 90 tablet, Rfl: 1  •  spironolactone (ALDACTONE) 25 MG tablet, Take 1 tablet by mouth Every Other Day. (Patient taking differently: Take 0.5 tablets by mouth Daily.), Disp: 45 tablet, Rfl: 3  •  tacrolimus (PROTOPIC) 0.1 % ointment, , Disp: , Rfl:   •  valACYclovir (VALTREX) 1000 MG tablet, Take 1 tablet by mouth Daily., Disp: , Rfl:   •  vitamin B-12 (CYANOCOBALAMIN) 1000 MCG tablet, TAKE 1 TABLET BY MOUTH DAILY, Disp: 90 tablet, Rfl: 1    Objective  Vital Sign Review:   Vitals:    06/02/25 1449   BP: 133/72   Pulse: 61   SpO2: 96%       Body mass index is 31.22 kg/m².      06/02/25  1449   Weight: 82.6 kg (182 lb)         Physical Exam:  Constitutional:       Appearance: Normal and healthy appearance.   Neck:      Vascular: No carotid bruit or JVD. JVD normal.   Pulmonary:      Effort: Pulmonary effort is normal.      Breath sounds: Normal breath sounds.   Cardiovascular:      PMI at left midclavicular line. Normal rate. Regular rhythm.   Pulses:     Intact distal pulses.   Edema:     Peripheral edema present.     Pretibial: bilateral 2+ edema of the pretibial area.     Ankle: bilateral 2+ edema of the ankle.  Abdominal:      Palpations: Abdomen is soft.      Tenderness: There is no abdominal tenderness.   Skin:     General: Skin is warm and dry.   Neurological:      General: No focal deficit present.      Mental Status: Alert and oriented to  person, place and time.   Psychiatric:         Behavior: Behavior is cooperative.          DATA REVIEWED:   EKG:  ECG 12 Lead     Date/Time: 8/12/2024 1:25 PM  Performed by: Sydney Gilliam APRN     Authorized by: Sydney Gilliam APRN  Comparison: compared with previous ECG   Similar to previous ECG  Rhythm: atrial fibrillation and paced  Rate: normal  BPM: 67  Conduction: right bundle branch block  QRS axis: normal  Comments: Similar to prior    ---------------------------------------------------  ECHO:    Results for orders placed during the hospital encounter of 04/23/24    Adult Transthoracic Echo Complete W/ Cont if Necessary Per Protocol    Interpretation Summary  •  Left ventricular systolic function is normal. Left ventricular ejection fraction appears to be 61 - 65%.  •  Left ventricular diastolic function was indeterminate.  •  The right ventricular cavity is mildly dilated. Normal right ventricular systolic function noted.  •  The left atrial cavity is severely dilated.  •  The right atrial cavity is severely dilated.  •  Saline test results are negative.  •  Mild aortic valve regurgitation is present.  •  Mild mitral valve regurgitation is present.  •  Mild to moderate tricuspid valve regurgitation is present.  •  Calculated right ventricular systolic pressure from tricuspid regurgitation is 38 mmHg.  •  There is no evidence of pericardial effusion.          -----------------------------------------------------  RHC/LHC    No results found for this or any previous visit.      ---------------------------------------------------------------------------    CT:   CTA chest  FINDINGS:  1. There is no convincing evidence for pulmonary thromboemboli. Again  seen is heterogeneous admixture of contrast of the pulmonary arteries at  the right lower lobe, but no pulmonary thromboemboli are seen. Pulmonary  arteries are enlarged with the main pulmonary artery measuring 3.8 cm in  diameter, pulmonary arterial  hypertension.     The thoracic aorta is ectatic at 4.0 cm, stable when measured along the  same planes. The diameter tapers to 3 cm at the aortic arch.     2. Stable marked elevation of the right hemidiaphragm with significant  compressive atelectasis at the right lower lobe. No new airspace  consolidations are seen suspicious for pneumonia. There are no pleural  or pericardial effusions. There is cardiomegaly and findings of  right-sided cardiac insufficiency, unchanged.     3. Enlarged mediastinal nodes are stable and extensive multinodular  goiter appears stable, although not seen in its entirety. No new  abnormality seen at the visualized upper abdomen, multiple renal cysts  are noted.      --------------------------------------------------------------------------------------------------------------    Laboratory evaluations:  Renal Function: Estimated Creatinine Clearance: 34.4 mL/min (A) (by C-G formula based on SCr of 1.13 mg/dL (H)).    Lab Results   Component Value Date    GLUCOSE 92 05/29/2025    BUN 23.0 05/29/2025    CREATININE 1.13 (H) 05/29/2025    EGFRIFNONA 77 07/29/2021    EGFRIFAFRI 115 01/24/2022    BCR 20.4 05/29/2025    K 4.7 05/29/2025    CO2 24.2 05/29/2025    CALCIUM 10.4 (H) 05/29/2025    ALBUMIN 3.7 02/10/2025    AST 13 02/10/2025    ALT <5 02/10/2025     Lab Results   Component Value Date    WBC 3.33 (L) 02/25/2025    HGB 12.6 02/25/2025    HCT 36.0 02/25/2025    .7 (H) 02/25/2025     (L) 02/25/2025     Lab Results   Component Value Date    HGBA1C 5.4 04/25/2022     Lab Results   Component Value Date    HGBA1C 5.4 04/25/2022    HGBA1C 4.90 06/23/2021    HGBA1C 5.30 05/11/2021     Lab Results   Component Value Date    CREATININE 1.13 (H) 05/29/2025     Lab Results   Component Value Date    IRON 106 02/25/2025    TIBC 338 02/25/2025    FERRITIN 122.00 02/25/2025       Result Review:  I have personally reviewed the results from the time of this admission to 6/2/2025 15:04 EDT  and agree with these findings:  [x]  Laboratory list / accordion  []  Microbiology  [x]  Radiology  [x]  EKG/Telemetry   [x]  Cardiology/Vascular   []  Pathology  [x]  Old records  []  Other:          ReDS VOLUMETRIC ASSESSMENT:  UTO      Assessment & Plan     1. Chronic heart failure with preserved ejection fraction    2. Pulmonary hypertension    3. Dysfunction of right cardiac ventricle    4. Bilateral leg edema        Pulmonary hypertension-she has HFpEF and HUGO. we will focus on GDMT for heart failure.  PFTs showed Mild restrictive lung disease with a moderate diffusion deficit.   Patient will follow-up with Dr Win for PH management--has not been indicated to have PAH specific medications.     HFpEF.  4/2024 LVEF 61 to 65%  Today she appears to be approaching euvolemia she has lost 2 pounds.  She continues to have slight increase in leg edema, but denies any shortness of breath or dyspnea on exertion.  Denies orthopnea..    She has tolerated the increased dose of Lasix to 40 mg once a day.  Will check BMP today  I will plan to keep the dose of Lasix at 40 mg once a day for the next 2 weeks and we will follow-up with labs in 2 weeks and follow-up in 1 month in clinic    3. Right ventricular dysfunction--would like to keep patient on spironolactone --she was on 12.5mg daily, but cutting the tablet in half has become too difficult, she will start taking a whole tablet(25mg) every other day.     4.  Bilateral leg edema-she continues to have bilateral leg edema, weight is improving I suspect some of her leg edema could be chronic lymphedema. I would like patient to follow-up in lymphedema clinic--because the patient is relatively asymptomatic for fluid overload elsewise I do not want to keep increasing diuretics for leg edema alone.  I have also asked that she start wearing compression socks    NYHA stage C FC-III     Clinical status was assessed and has remained stable.  Treatment intent: curative   ReDS  reading was not obtained today.  ReDS result: UTO       Today, Patient appears euvolemic. and with perfusion. The patient's hemodynamics are currently acceptable. HR is: normal and is at goal. BP/MAP was reviewed and there is not room for medication up-titration.  The patient's clinical course has been impacted by Arrythmia-persistent afib, untreated HUGO. LVEF: 61-65%.     GDMT Assessment:     GDMT changes recommended today: No changes to medication therapy.      BB:   Rate controlled without BB--possible future addition as patient has pacemaker if needed  Monitor heart rate.  Please call the [facility] for HR<50, dizziness, lightheadedness, syncope     A/A/A:   Hemodynamics have not previously allow--recent issues with hyperkalemia.  Patient was taking extra potassium supplement     [id number]:  Not a candidate due to having severe vaginal yeast infections.       MRA:  The patient is FC-NYHA Class III and MRA is indicated.   continue Spironolactone to 25mg every other day  Quarterly GFR/electrolyte assessment          Diuretic Regimen:  -DIURETIC:   furosemide (LASIX) 40 mg daily   -Fluid restriction:   -requested  -2000 ml  -Patient has been asked to weigh daily and was provided with a printed diuretic strategy.  -Sodium restriction:   -1,500 mg Na restriction was discussed.      Labs/Diagnostics/Referrals:    Labs -Chem-7       Lifestyle Advice:   - call office if I gain more than 2 pounds in one day or 5 pounds in one week   - keep legs up while sitting   - use salt in moderation   - watch for swelling in feet, ankles and legs every day   - weigh myself daily   -call for concerning s/sx   -- activity or exercise based on tolerance encouraged     CODE STATUS: FULL              No follow-ups on file.  F/u as scheduled            Thank you for allowing me to participate in the care of your patient,         SHAHID Bazan  [facility] HEART FAILURE  06/02/25  09:12 EDT      **Deon Disclaimer:**  Much of this  encounter note is an electronic transcription/translation of spoken language to printed text. The electronic translation of spoken language may permit erroneous, or at times, nonsensical words or phrases to be inadvertently transcribed. Although I have reviewed the note for such errors, some may still exist.    The information in this note was reviewed and updated during the visit to be as accurate as possible. As many patients have chronic medical problems, many of their individual problems and ongoing plans do not change significantly from visit to visit.  This information is correct and is consistent with my treatment plan as of today's visit.

## 2025-06-02 NOTE — PROGRESS NOTES
[facility]    Provider, [place] [address]    Thank you for asking me to see Brittany Villeda.     Congestive Heart Failure  Pertinent negatives include no chest pain or shortness of breath.       1.  Pulmonary hypertension  2.  HFpEF    Subjective      Brittany Damian a 89 y.o. female who presents today for pulmonary hypertension, HFpEF.   The patient's most recent 2D TTE showed evidence of pulmonary hypertension and right-sided involvement.  She does have biatrial dilatation.  She was diagnosed with HUGO. She does not use a CPAP. She was diagnosed with complete heart block in the past and required placement of a PPM--Status post [employer] dual-chamber pacemaker in 2018 .  She also was diagnosed with COPD.  She does not routinely follow with pulmonary medicine.  She also was diagnosed with MGUS.      Since last visit patient had admission in December 20, 2024 with COVID-19.  She was treated with Paxlovid and was able to be discharged.    Patient had hospitalization 1/18/2025 to 1/20/2025.  Acute decompensated Heart falure Hospitalization.  She received IV Lasix for diuresis with improvement.  She was discharged on home medications-Lasix 20 mg daily, spironolactone 12.5 mg daily. Feels like this was due to not having medicaitons.     Stable exercise intolerance.  Denies any abdominal distention.    She denies any new or worsening exertional dyspnea/shortness of breath  At last visit due to increasing leg edema and weight her diuretic was increased.  Denies any PND.   Reports no change in leg edema, did lose 2 pounds since last visit.      Review of Systems - Review of Systems   Constitutional: Positive for weight gain. Negative for weight loss.   Cardiovascular:  Positive for leg swelling. Negative for chest pain, dyspnea on exertion, orthopnea, paroxysmal nocturnal dyspnea and syncope.   Respiratory:  Negative for cough,  shortness of breath, sleep disturbances due to breathing and snoring.    Gastrointestinal:  Negative for bloating.   Neurological:  Negative for dizziness, light-headedness and weakness.          Patient Active Problem List   Diagnosis    Non-toxic multinodular goiter    Chronic midline low back pain with right-sided sciatica    Essential hypertension    Gastroesophageal reflux disease without esophagitis    Cataract    Pulmonary hypertension    Diastolic dysfunction    HUGO (obstructive sleep apnea)    Trifascicular block    MGUS (monoclonal gammopathy of unknown significance)    Ulcerative rectosigmoiditis without complication    Lichen sclerosus    Heart block    Sleep-related hypoxia    Bradycardia    History of atrial fibrillation    Pacemaker    Paroxysmal SVT (supraventricular tachycardia)    History of chest pain    Palpitations    Paroxysmal atrial fibrillation    Ascending aortic aneurysm    Mediastinal lymphadenopathy    Anemia    Obesity (BMI 30-39.9)    Generalized weakness    Dysautonomia    Hypercalcemia    Hyperparathyroidism    Choledocholithiasis    Duodenal ulcer    Hemorrhagic gastritis    Exertional dyspnea    COPD (chronic obstructive pulmonary disease)    Osteoarthritis of glenohumeral joint    ICH (intracerebral hemorrhage)    Lower GI bleed    Thrombocytopenia    Lichen sclerosus of female genitalia    Senile atrophic vaginitis    Left inguinal hernia    Vulvar pain    Bilateral lower extremity edema    Chronic heart failure with preserved ejection fraction    Acute renal insufficiency    Dysfunction of right cardiac ventricle    COVID-19 virus infection    Shortness of breath    Acute lower GI bleeding     family history includes Breast cancer in her sister; Diabetes in her mother; Heart disease in her sister; Kidney cancer in her sister; Prostate cancer in her brother, brother, and brother.   reports that she quit smoking about 60 years ago. Her smoking use included cigarettes. She started  smoking about 72 years ago. She has a 18 pack-year smoking history. She has been exposed to tobacco smoke. She has never used smokeless tobacco. She reports that she does not drink alcohol and does not use drugs.  Allergies   Allergen Reactions    Codeine Hallucinations     Tolerates hydromorphone    Amitriptyline Rash    Amoxicillin-Pot Clavulanate Rash    Aspirin Unknown - Low Severity     Patient doesn't know why    Carisoprodol-Aspirin-Codeine Palpitations    Iodinated Contrast Media Rash    Latex Rash    Naproxen Rash    Nsaids Unknown - Low Severity     UNSURE OF REACTION    Soma Compound With Codeine [Carisoprodol-Aspirin-Codeine] Rash    Sulfa Antibiotics Rash    Tramadol Palpitations     heart races      Physical Activity: Sufficiently Active (4/26/2024)    Exercise Vital Sign     Days of Exercise per Week: 5 days     Minutes of Exercise per Session: 30 min          Current Outpatient Medications:     acetaminophen (TYLENOL) 500 MG tablet, Take 1 tablet by mouth 2 (Two) Times a Day., Disp: , Rfl:     docusate sodium (COLACE) 100 MG capsule, Take 1 capsule by mouth Daily., Disp: , Rfl:     ELDERBERRY PO, Take 2 tablets by mouth Daily., Disp: , Rfl:     furosemide (LASIX) 40 MG tablet, Take 1 tablet by mouth Daily. Taking 20mg, Disp: , Rfl:     lidocaine (XYLOCAINE) 5 % ointment, Apply  topically to the appropriate area as directed Daily As Needed for Mild Pain (for rectal discomfort)., Disp: 50 g, Rfl: 5    Magnesium Oxide -Mg Supplement 400 (240 Mg) MG tablet, TAKE 1 TABLET BY MOUTH DAILY, Disp: 90 tablet, Rfl: 1    mesalamine (APRISO) 0.375 g 24 hr capsule, TAKE FOUR CAPSULES BY MOUTH DAILY, Disp: 360 capsule, Rfl: 2    mesalamine (CANASA) 1000 MG suppository, Insert 1 suppository into the rectum At Night As Needed (pain/bleeding)., Disp: 30 each, Rfl: 2    pantoprazole (PROTONIX) 40 MG EC tablet, TAKE 1 TABLET BY MOUTH DAILY, Disp: 90 tablet, Rfl: 1    spironolactone (ALDACTONE) 25 MG tablet, Take 1  tablet by mouth Every Other Day. (Patient taking differently: Take 0.5 tablets by mouth Daily.), Disp: 45 tablet, Rfl: 3    tacrolimus (PROTOPIC) 0.1 % ointment, , Disp: , Rfl:     valACYclovir (VALTREX) 1000 MG tablet, Take 1 tablet by mouth Daily., Disp: , Rfl:     vitamin B-12 (CYANOCOBALAMIN) 1000 MCG tablet, TAKE 1 TABLET BY MOUTH DAILY, Disp: 90 tablet, Rfl: 1    Objective   Vital Sign Review:   Vitals:    06/02/25 1449   BP: 133/72   Pulse: 61   SpO2: 96%       Body mass index is 31.22 kg/m².      06/02/25  1449   Weight: 82.6 kg (182 lb)         Physical Exam:  Constitutional:       Appearance: Normal and healthy appearance.   Neck:      Vascular: No carotid bruit or JVD. JVD normal.   Pulmonary:      Effort: Pulmonary effort is normal.      Breath sounds: Normal breath sounds.   Cardiovascular:      PMI at left midclavicular line. Normal rate. Regular rhythm.   Pulses:     Intact distal pulses.   Edema:     Peripheral edema present.     Pretibial: bilateral 2+ edema of the pretibial area.     Ankle: bilateral 2+ edema of the ankle.  Abdominal:      Palpations: Abdomen is soft.      Tenderness: There is no abdominal tenderness.   Skin:     General: Skin is warm and dry.   Neurological:      General: No focal deficit present.      Mental Status: Alert and oriented to person, place and time.   Psychiatric:         Behavior: Behavior is cooperative.          DATA REVIEWED:   EKG:  ECG 12 Lead     Date/Time: 8/12/2024 1:25 PM  Performed by: Sydney Gilliam APRN     Authorized by: Sydney Gilliam APRN  Comparison: compared with previous ECG   Similar to previous ECG  Rhythm: atrial fibrillation and paced  Rate: normal  BPM: 67  Conduction: right bundle branch block  QRS axis: normal  Comments: Similar to prior    ---------------------------------------------------  ECHO:    Results for orders placed during the hospital encounter of 04/23/24    Adult Transthoracic Echo Complete W/ Cont if Necessary Per  Protocol    Interpretation Summary    Left ventricular systolic function is normal. Left ventricular ejection fraction appears to be 61 - 65%.    Left ventricular diastolic function was indeterminate.    The right ventricular cavity is mildly dilated. Normal right ventricular systolic function noted.    The left atrial cavity is severely dilated.    The right atrial cavity is severely dilated.    Saline test results are negative.    Mild aortic valve regurgitation is present.    Mild mitral valve regurgitation is present.    Mild to moderate tricuspid valve regurgitation is present.    Calculated right ventricular systolic pressure from tricuspid regurgitation is 38 mmHg.    There is no evidence of pericardial effusion.          -----------------------------------------------------  RHC/LHC    No results found for this or any previous visit.      ---------------------------------------------------------------------------    CT:   CTA chest  FINDINGS:  1. There is no convincing evidence for pulmonary thromboemboli. Again  seen is heterogeneous admixture of contrast of the pulmonary arteries at  the right lower lobe, but no pulmonary thromboemboli are seen. Pulmonary  arteries are enlarged with the main pulmonary artery measuring 3.8 cm in  diameter, pulmonary arterial hypertension.     The thoracic aorta is ectatic at 4.0 cm, stable when measured along the  same planes. The diameter tapers to 3 cm at the aortic arch.     2. Stable marked elevation of the right hemidiaphragm with significant  compressive atelectasis at the right lower lobe. No new airspace  consolidations are seen suspicious for pneumonia. There are no pleural  or pericardial effusions. There is cardiomegaly and findings of  right-sided cardiac insufficiency, unchanged.     3. Enlarged mediastinal nodes are stable and extensive multinodular  goiter appears stable, although not seen in its entirety. No new  abnormality seen at the visualized upper  abdomen, multiple renal cysts  are noted.      --------------------------------------------------------------------------------------------------------------    Laboratory evaluations:  Renal Function: Estimated Creatinine Clearance: 34.4 mL/min (A) (by C-G formula based on SCr of 1.13 mg/dL (H)).    Lab Results   Component Value Date    GLUCOSE 92 05/29/2025    BUN 23.0 05/29/2025    CREATININE 1.13 (H) 05/29/2025    EGFRIFNONA 77 07/29/2021    EGFRIFAFRI 115 01/24/2022    BCR 20.4 05/29/2025    K 4.7 05/29/2025    CO2 24.2 05/29/2025    CALCIUM 10.4 (H) 05/29/2025    ALBUMIN 3.7 02/10/2025    AST 13 02/10/2025    ALT <5 02/10/2025     Lab Results   Component Value Date    WBC 3.33 (L) 02/25/2025    HGB 12.6 02/25/2025    HCT 36.0 02/25/2025    .7 (H) 02/25/2025     (L) 02/25/2025     Lab Results   Component Value Date    HGBA1C 5.4 04/25/2022     Lab Results   Component Value Date    HGBA1C 5.4 04/25/2022    HGBA1C 4.90 06/23/2021    HGBA1C 5.30 05/11/2021     Lab Results   Component Value Date    CREATININE 1.13 (H) 05/29/2025     Lab Results   Component Value Date    IRON 106 02/25/2025    TIBC 338 02/25/2025    FERRITIN 122.00 02/25/2025       Result Review:  I have personally reviewed the results from the time of this admission to 6/2/2025 15:04 EDT and agree with these findings:  [x]  Laboratory list / accordion  []  Microbiology  [x]  Radiology  [x]  EKG/Telemetry   [x]  Cardiology/Vascular   []  Pathology  [x]  Old records  []  Other:          ReDS VOLUMETRIC ASSESSMENT:  UTO      Assessment & Plan      1. Chronic heart failure with preserved ejection fraction    2. Pulmonary hypertension    3. Dysfunction of right cardiac ventricle    4. Bilateral leg edema        Pulmonary hypertension-she has HFpEF and HUGO. we will focus on GDMT for heart failure.  PFTs showed Mild restrictive lung disease with a moderate diffusion deficit.   Patient will follow-up with Dr Win for PH management--has  not been indicated to have PAH specific medications.     HFpEF.  4/2024 LVEF 61 to 65%  Today she appears to be approaching euvolemia she has lost 2 pounds.  She continues to have slight increase in leg edema, but denies any shortness of breath or dyspnea on exertion.  Denies orthopnea..    She has tolerated the increased dose of Lasix to 40 mg once a day.  Will check BMP today  I will plan to keep the dose of Lasix at 40 mg once a day for the next 2 weeks and we will follow-up with labs in 2 weeks and follow-up in 1 month in clinic    3. Right ventricular dysfunction--would like to keep patient on spironolactone --she was on 12.5mg daily, but cutting the tablet in half has become too difficult, she will start taking a whole tablet(25mg) every other day.     4.  Bilateral leg edema-she continues to have bilateral leg edema, weight is improving I suspect some of her leg edema could be chronic lymphedema. I would like patient to follow-up in lymphedema clinic--because the patient is relatively asymptomatic for fluid overload elsewise I do not want to keep increasing diuretics for leg edema alone.  I have also asked that she start wearing compression socks    NYHA stage C FC-III     Clinical status was assessed and has remained stable.  Treatment intent: curative   ReDS reading was not obtained today.  ReDS result: UTO       Today, Patient appears euvolemic. and with perfusion. The patient's hemodynamics are currently acceptable. HR is: normal and is at goal. BP/MAP was reviewed and there is not room for medication up-titration.  The patient's clinical course has been impacted by Arrythmia-persistent afib, untreated HUGO. LVEF: 61-65%.     GDMT Assessment:     GDMT changes recommended today: No changes to medication therapy.      BB:   Rate controlled without BB--possible future addition as patient has pacemaker if needed  Monitor heart rate.  Please call the [facility] for HR<50, dizziness, lightheadedness, syncope     A/A/A:    Hemodynamics have not previously allow--recent issues with hyperkalemia.  Patient was taking extra potassium supplement     [id number]:  Not a candidate due to having severe vaginal yeast infections.       MRA:  The patient is FC-NYHA Class III and MRA is indicated.   continue Spironolactone to 25mg every other day  Quarterly GFR/electrolyte assessment          Diuretic Regimen:  -DIURETIC:   furosemide (LASIX) 40 mg daily   -Fluid restriction:   -requested  -2000 ml  -Patient has been asked to weigh daily and was provided with a printed diuretic strategy.  -Sodium restriction:   -1,500 mg Na restriction was discussed.      Labs/Diagnostics/Referrals:    Labs -Chem-7       Lifestyle Advice:   - call office if I gain more than 2 pounds in one day or 5 pounds in one week   - keep legs up while sitting   - use salt in moderation   - watch for swelling in feet, ankles and legs every day   - weigh myself daily   -call for concerning s/sx   -- activity or exercise based on tolerance encouraged     CODE STATUS: FULL              No follow-ups on file.  F/u as scheduled            Thank you for allowing me to participate in the care of your patient,         SHAHID Bazan  [facility]  06/02/25  09:12 EDT      **Deon Disclaimer:**  Much of this encounter note is an electronic transcription/translation of spoken language to printed text. The electronic translation of spoken language may permit erroneous, or at times, nonsensical words or phrases to be inadvertently transcribed. Although I have reviewed the note for such errors, some may still exist.    The information in this note was reviewed and updated during the visit to be as accurate as possible. As many patients have chronic medical problems, many of their individual problems and ongoing plans do not change significantly from visit to visit.  This information is correct and is consistent with my treatment plan as of today's visit.

## 2025-06-02 NOTE — ADDENDUM NOTE
Encounter addended by: Jessenia Knight MA on: 6/2/2025 4:04 PM   Actions taken: Charge Capture section accepted

## 2025-06-03 ENCOUNTER — RESULTS FOLLOW-UP (OUTPATIENT)
Dept: CARDIOLOGY | Facility: HOSPITAL | Age: OVER 89
End: 2025-06-03
Payer: MEDICAID

## 2025-06-03 NOTE — TELEPHONE ENCOUNTER
Called and informed patient daughter of lab results per mariely olivera. Amina verbalized understanding

## 2025-06-09 ENCOUNTER — HOSPITAL ENCOUNTER (OUTPATIENT)
Dept: OCCUPATIONAL THERAPY | Facility: HOSPITAL | Age: OVER 89
Setting detail: THERAPIES SERIES
Discharge: HOME | End: 2025-06-09
Payer: MEDICARE

## 2025-06-09 DIAGNOSIS — I89.0 LYMPHEDEMA, NOT ELSEWHERE CLASSIFIED: Primary | ICD-10-CM

## 2025-06-09 PROCEDURE — 97166 OT EVAL MOD COMPLEX 45 MIN: CPT

## 2025-06-09 PROCEDURE — 97535 SELF CARE MNGMENT TRAINING: CPT

## 2025-06-09 NOTE — THERAPY EVALUATION
Outpatient Occupational Therapy Lymphedema Initial Evaluation  [facility]     Patient Name: Brittany Villeda  : 1935  MRN: [id number]  Today's Date: 2025      Visit Date: 2025    Patient Active Problem List   Diagnosis    Non-toxic multinodular goiter    Chronic midline low back pain with right-sided sciatica    Essential hypertension    Gastroesophageal reflux disease without esophagitis    Cataract    Pulmonary hypertension    Diastolic dysfunction    HUGO (obstructive sleep apnea)    Trifascicular block    MGUS (monoclonal gammopathy of unknown significance)    Ulcerative rectosigmoiditis without complication    Lichen sclerosus    Heart block    Sleep-related hypoxia    Bradycardia    History of atrial fibrillation    Pacemaker    Paroxysmal SVT (supraventricular tachycardia)    History of chest pain    Palpitations    Paroxysmal atrial fibrillation    Ascending aortic aneurysm    Mediastinal lymphadenopathy    Anemia    Obesity (BMI 30-39.9)    Generalized weakness    Dysautonomia    Hypercalcemia    Hyperparathyroidism    Choledocholithiasis    Duodenal ulcer    Hemorrhagic gastritis    Exertional dyspnea    COPD (chronic obstructive pulmonary disease)    Osteoarthritis of glenohumeral joint    ICH (intracerebral hemorrhage)    Lower GI bleed    Thrombocytopenia    Lichen sclerosus of female genitalia    Senile atrophic vaginitis    Left inguinal hernia    Vulvar pain    Bilateral lower extremity edema    Chronic heart failure with preserved ejection fraction    Acute renal insufficiency    Dysfunction of right cardiac ventricle    COVID-19 virus infection    Shortness of breath    Acute lower GI bleeding        Past Medical History:   Diagnosis Date    Abdominal aortic aneurysm     Anemia     Arrhythmia     Arthritis     Asthma     Bradycardia     CHF (congestive heart failure) 2023    Choledocholithiasis 2021    Colitis     Diastolic dysfunction     Essential hypertension  05/12/2016    GERD (gastroesophageal reflux disease)     Heart block     Hypertension     Hypertensive heart disease     Hyperthyroidism     REPORTS ENLARGED    Inguinal hernia     Kidney stone     Leukopenia     MGUS (monoclonal gammopathy of unknown significance)     Nephrolithiasis     Pacemaker     Paroxysmal atrial fibrillation     Peptic ulceration     Pressure ulcer     REPORTS ON COCCYX. INSTR TO NOTIFY DR BAIRES'S OFFICE    PSVT (paroxysmal supraventricular tachycardia)     Pulmonary hypertension     Rectal bleed     Sleep apnea     NO DEVICE    Subdural hematoma     Systemic hypertension     Trifascicular block     Ulcerative rectosigmoiditis without complication     Ventricular tachycardia     nonsustained        Past Surgical History:   Procedure Laterality Date    BACK SURGERY      lumbar fusion    DUSTY HOLE Left 08/08/2021    Procedure: Left-sided dusty holes for evacuation of subdural hematoma;  Surgeon: Gustavo Callaway MD;  Location: [facility] MAIN OR;  Service: Neurosurgery;  Laterality: Left;    CARDIAC ELECTROPHYSIOLOGY PROCEDURE N/A 11/07/2018    Procedure: Pacemaker DC new   [place];  Surgeon: Jesus Granda MD;  Location: [facility] CATH INVASIVE LOCATION;  Service: Cardiology    CHOLECYSTECTOMY      COLONOSCOPY  06/16/2014    colitis, cryptitis,  tics, NBIH, TA w/low grade dysplasia    COLONOSCOPY N/A 10/28/2019    Procedure: COLONOSCOPY WITH COLD AND HOT POLYPECTOMIES;  Surgeon: Micaela English MD;  Location: [facility] ENDOSCOPY;  Service: Gastroenterology    ENDOSCOPY N/A 05/13/2021    Procedure: ESOPHAGOGASTRODUODENOSCOPY with BX;  Surgeon: Stefan Mcfadden MD;  Location: [facility] ENDOSCOPY;  Service: Gastroenterology;  Laterality: N/A;  EPIGASTRIC PAIN  --DUODENAL ULCER, HEMORRHAGIC GASTRITIS, HIATAL HERNIA     ENDOSCOPY N/A 10/11/2022    Procedure: ESOPHAGOGASTRODUODENOSCOPY;  Surgeon: Micaela English MD;  Location: [facility] ENDOSCOPY;  Service: Gastroenterology;  Laterality: N/A;   "PRE- MELENA  POST- ESOPHAGITIS    HEMORRHOIDECTOMY      HERNIA REPAIR      umbilical    HYSTERECTOMY      INGUINAL HERNIA REPAIR Left 09/01/2023    Procedure: INGUINAL HERNIA REPAIR LEFT;  Surgeon: Antonio Foster MD;  Location: [facility];  Service: General;  Laterality: Left;    JOINT REPLACEMENT Bilateral     KNEES    MYOMECTOMY      PACEMAKER IMPLANTATION      SHOULDER SURGERY      SIGMOIDOSCOPY N/A 11/02/2022    Procedure: SIGMOIDOSCOPY FLEXIBLE WITH COLD BIOPSIES AND ASPIRATE;  Surgeon: Micaela English MD;  Location: [facility] ENDOSCOPY;  Service: Gastroenterology;  Laterality: N/A;  PRE- RECTAL BLEEDING  POST- HEMORRHOIDS, DIVERTICULOSIS, COLITIS    SINUS SURGERY      TOE NAIL AMPUTATION  03/04/2019    TONSILLECTOMY      UPPER GASTROINTESTINAL ENDOSCOPY           Visit Dx:     ICD-10-CM ICD-9-CM   1. Lymphedema, not elsewhere classified  I89.0 457.1        Patient History       Row Name 06/09/25 1400             History    Chief Complaint Swelling  -CW      Date Current Problem(s) Began --  A long hx LE edema \"on/off\" per daughter  -CW      Brief Description of Current Complaint Pt./daughter reports her LE edema started a long time ago and has been \"on/off\". Hx. pacemaker, arthritis, sleep apnea, immobility, pulmonary HTN, tachycardia, 4/23-dx CHF, aneurysm of ascending aorta. back surgery, Dee hole sx, B TKR.  -CW      Patient/Caregiver Goals Decrease swelling  -CW      How has patient tried to help current problem? compression stockings, elevation, diuretics.  -CW      Results of Clinical Tests No recent blood clots per daughter  -CW         Fall Risk Assessment    Any falls in the past year: No  -CW         Daily Activities    Primary Language English  -CW      Are you able to read Yes  -CW      Are you able to write Yes  -CW      Teaching needs identified Management of Condition;Home Exercise Program  -CW      Patient is concerned about/has problems with Standing;Walking;Transfers (getting out of a " "chair, bed);Performing home management (household chores, shopping, care of dependents);Difficulty with self care (i.e. bathing, dressing, toileting:;Climbing Stairs  -CW      Functional Status mobility issues preventing performance of daily activities  -CW      Explanation of Functional Status Problem Pt. has difficulty with steps, walking (uses R wx.). Reports she can get dressed. Her family is available to assist. Lives with son.  -CW      Pt Participated in POC and Goals Yes  -CW                User Key  (r) = Recorded By, (t) = Taken By, (c) = Cosigned By      Initials Name Provider Type    CW Faina Rees OTR Occupational Therapist                     Lymphedema       Row Name 06/09/25 1400             Subjective Pain    Able to rate subjective pain? yes  -CW      Pre-Treatment Pain Level 4  -CW      Post-Treatment Pain Level 4  -CW         Subjective    Subjective Comments Pt. reports LE pain 4/10 with \"pinching\" pain at times.  -CW         Lymphedema Assessment    Lymphedema Classification RLE:;LLE:;stage 2 (Spontaneously Irreversible)  -CW      Infections or Cellulitis? unspecified  -CW      Lymphedema Precautions CHF  -CW      Lymphedema Assessment Comments RLE edema >LLE  -CW         Posture/Observations    Observations Edema  -CW      Posture/Observations Comments Stands with flexed posture  -CW         General ROM    GENERAL ROM COMMENTS BLE AROM WFL's with tightness ankles/feet.  -CW         MMT (Manual Muscle Testing)    General MMT Comments General BLE 3+/5. Weakness hip flexors.  -CW         Lymphedema Edema Assessment    Ptting Edema Category By grade out of 4  -CW      Pitting Edema Moderate;+ 3/4  -CW      Stemmer Sign bilateral:;positive  -CW      Edema Assessment Comment RLE edema >LLE  -CW         Skin Changes/Observations    Location/Assessment Lower Extremity  -CW      Lower Extremity Conditions bilateral:;intact;dry  -CW      Lower Extremity Skin Details Mild discoloration B lower " legs/feet.  -CW         Lymphedema Sensation    Lymphedema Sensation Reports RLE:;LLE:;normal  -CW         Lymphedema Measurements    Measurement Type(s) Circumferential  -CW      Circumferential Areas Lower extremities  -CW         BLE Circumferential (cm)    Measurement Location 1 10 cm. above knee  -CW      Left 1 55.5 cm  -CW      Right 1 57 cm  -CW      Measurement Location 2 knee  -CW      Left 2 41 cm  -CW      Right 2 41.5 cm  -CW      Measurement Location 3 10 cm below  knee  -CW      Left 3 37 cm  -CW      Right 3 35.5 cm  -CW      Measurement Location 4 20 cm below knee  -CW      Left 4 27 cm  -CW      Right 4 33.5 cm  -CW      Measurement Location 5 30 cm below knee  -CW      Left 5 30 cm  -CW      Right 5 33 cm  -CW      Measurement Location 6 ankle  -CW      Left 6 34 cm  -CW      Right 6 35 cm  -CW      Measurement Location 7 mid foot  -CW      Left 7 25.3 cm  -CW      Right 7 26.2 cm  -CW      LLE Circumferential Total 249.8 cm  -CW      RLE Circumferential Total 261.7 cm  -CW         Compression/Skin Care    Compression/Skin Care compression garment  -CW      Compression Garment Comments Pt. has stockings at home, possibly MICHAELA hose.  -CW                User Key  (r) = Recorded By, (t) = Taken By, (c) = Cosigned By      Initials Name Provider Type    Faina Arnett OTR Occupational Therapist                            Therapy Education  Education Details: Discussed lymph. tx. program and poc. Reviewed long term edema management options. Issued handouts including Healthy habits for edema risk reduction. Reviewed skin care and encouraged BLE elevation  Given: Edema management, Symptoms/condition management  Program: New  How Provided: Verbal  Provided to: Patient, Caregiver  Level of Understanding: Verbalized  16913 - OT Self Care/Mgmt Minutes: 10         OT Goals       Row Name 06/09/25 1400          OT Short Term Goals    STG Date to Achieve 06/23/25  -CW     STG 1 Patient to demonstrate proper  awareness of “What is Lymphedema” and DO’s and Don’ts” for improved prevention, management, care of symptoms, and ease of transition to self-care of condition.  -CW     STG 1 Progress New  -CW     STG 2 Patient independent and compliant with self-wrapping techniques of compression bandages or velcro compression with family member or caregiver as indicated for improved self-management of condition.  -CW     STG 2 Progress New  -CW     STG 3 Patient demonstrate decreased net edema of  BLE's >/5-10 cm. for decreased in edema, symptoms, decreased risk of infection, and improved skin-care/transition to self-care of condition.  -CW     STG 3 Progress New  -     STG 4 Patient independent and compliant with home exercise program (as able) focused on range of motion, flexibility, to improve lymphatic flow and discomfort.  -CW     STG 4 Progress New  -        Long Term Goals    LTG Date to Achieve 07/07/25  -CW     LTG 1 Patient will demonstrate decreased net edema of  BLE's >/=10-20 cm. for decrease in symptoms, decreased risk of infection, and improved skin-care/transition to self-care of condition.  -CW     LTG 1 Progress New  -CW     LTG 2 Patient/family/caregivers independent with self-care techniques for self-management of condition.  -CW     LTG 2 Progress New  -CW     LTG 3 Patient independent and compliant with use and care of compression (example garments, inelastic velcro compression) with assistance of a caregiver as needed to promote self-care independence.  -     LTG 3 Progress New  -        Time Calculation    OT Goal Re-Cert Due Date 09/01/25  -               User Key  (r) = Recorded By, (t) = Taken By, (c) = Cosigned By      Initials Name Provider Type    Faina Arnett OTR Occupational Therapist                     OT Assessment/Plan       Row Name 06/09/25 5545          OT Assessment    Functional Limitations Limitations in functional capacity and performance;Limitations in community  "activities;Limitation in home management;Performance in self-care ADL;Decreased safety during functional activities  -CW     Impairments Integumentary integrity;Pain;Edema;Impaired lymphatic circulation;Impaired venous circulation  -CW     Assessment Comments Pt. presents 89 y/o female with moderate increased edema BLE’s. RLE edema >LLE.  Edema is consistent with secondary stage 2 mostly 3+ feet to knees. Stemmer sign positive. Skin is dry with pitting RLE>LLE toward ankles. Pt./daughter report pt. has a long hx of LE edema \"on/off\". Hx pacemaker, CHF, A-fib. Pt. complains of LE general pain 4/10 with \"pinching\" pain at times. Total circumference .7 cm. and .8cm. No weeping or drainage at present.  Mobility including AROM is limited with stiffness.  Pt. has some difficulty with standing, walking, transfers and uses a Rwx for ambulation. Her family /son is available to assist. Pt. would benefit from full Complete Decongestive Therapy (CDT) to decrease edema, decrease risk of infection, improve skin integrity, and to learn independent self-care edema management. Plan to call MD/APRN regarding clearance for compression considering her hx CHF. Discussed phase 1 acute decongestive phase and phase 2 maitenance phase of lymph. tx. Pt. has compression stockings at home or MICHAELA hose. Discussed velcro compression as another option to help with edema management. Pt. is scheduled for clinic day 6/18 with [facility] rep.  -CW     Please refer to paper survey for additional self-reported information Yes  -CW     OT Rehab Potential Good  -CW     Patient/caregiver participated in establishment of treatment plan and goals Yes  -CW     Patient would benefit from skilled therapy intervention Yes  -CW        OT Plan    OT Frequency 4x/week  -CW     Predicted Duration of Therapy Intervention (OT) 1 month  -CW     Planned CPT's? OT EVAL MOD COMPLEXITY: 68129;OT MANUAL THERAPY EA 15 MIN: 92130;OT THER PROC EA 15 MIN: 42409EZ;OT " SELF CARE/MGMT/TRAIN 15 MIN: 23043  -     Planned Therapy Interventions (Optional Details) home exercise program;manual therapy techniques;patient/family education  -     OT Plan Comments Plan to schedule pt. for tx as advised by MD regarding clearance for compression.  -               User Key  (r) = Recorded By, (t) = Taken By, (c) = Cosigned By      Initials Name Provider Type    CW Faina Rees OTR Occupational Therapist                              Time Calculation:   OT Start Time: 1243  OT Stop Time: 1340  OT Time Calculation (min): 57 min  Total Timed Code Minutes- OT: 10 minute(s)  Timed Charges  04633 - OT Self Care/Mgmt Minutes: 10  Total Minutes  Timed Charges Total Minutes: 10   Total Minutes: 10     Therapy Charges for Today       Code Description Service Date Service Provider Modifiers Qty    08876730886 HC OT SELF CARE/MGMT/TRAIN EA 15 MIN 6/9/2025 Faina Rees OTR GO 1    [id number]  OT EVAL MOD COMPLEXITY 3 6/9/2025 Faina Rees OTR GO 1                      KYLER Hernández  6/9/2025

## 2025-06-10 ENCOUNTER — TELEPHONE (OUTPATIENT)
Dept: CARDIOLOGY | Facility: HOSPITAL | Age: OVER 89
End: 2025-06-10
Payer: MEDICAID

## 2025-06-10 NOTE — TELEPHONE ENCOUNTER
----- Message from Brooke Clay sent at 6/10/2025  8:23 AM EDT -----  Regarding: RE: Compression Stockings  Yes she can  ----- Message -----  From: Rosemary Grove RegSched Rep  Sent: 6/10/2025   8:07 AM EDT  To: SHAHID Bazan  Subject: Compression Stockings                            I got a call at the end of the day yesterday from the Lymphedema clinic regarding this patient. Faina is asking if it is okay for patient to wear compression stockings? She was thinking of the Velcro ones if it is okay.    Thanks,  Rosemary THOMAS [facility]

## 2025-06-10 NOTE — TELEPHONE ENCOUNTER
I called the Lymphedema clinic this morning & let them know that SHAHID Scott Okay'd patient to wear Compression Stockings.    Thanks,  Rosemary THOMAS SAULO

## 2025-06-12 NOTE — PROGRESS NOTES
LOS: 0 days   Patient Care Team:  Mega Esparza MD as PCP - General (Family Medicine)  Micaela English MD as Consulting Physician (Gastroenterology)  Mary Grace Culp MD as Consulting Physician (Cardiology)  Jp Krause Jr., MD as Consulting Physician (Hematology and Oncology)  Yasmeen Pichardo [facility] as Pharmacist  Micaela English MD as Referring Physician (Gastroenterology)  Devon Valadez MD as Consulting Physician (Hematology and Oncology)  Rusty Interiano, JamarD as Pharmacist (Pharmacy)  Catie Jules PA as Physician Assistant (Gastroenterology)  Antonio Foster MD as Surgeon (General Surgery)  Valeria Darling APRN as Nurse Practitioner (Obstetrics and Gynecology)    Chief Complaint: Follow-up lower extremity edema, acute on chronic diastolic CHF, acute on chronic right-sided CHF, persistent atrial fibrillation.    Interval History: She is doing much better today.  Her lower extremity edema has improved with diuresis.  No chest pain or shortness of breath.    Vital Signs:  Temp:  [97.5 °F (36.4 °C)-97.8 °F (36.6 °C)] 97.5 °F (36.4 °C)  Heart Rate:  [59-66] 60  Resp:  [16-17] 16  BP: (107-127)/(58-76) 107/58    Intake/Output Summary (Last 24 hours) at 4/25/2024 1242  Last data filed at 4/25/2024 1008  Gross per 24 hour   Intake 120 ml   Output 5450 ml   Net -5330 ml       Physical Exam:   General Appearance:    No acute distress, alert and oriented x4   Lungs:     Decreased breath sounds at the bases     Heart:    Regular rhythm and normal rate (paced).  II/VI SM LLSB.    Abdomen:     Soft, nontender, nondistended.    Extremities:   1+ lower extremity edema (significantly improved).     Results Review:    Results from last 7 days   Lab Units 04/25/24  0725   SODIUM mmol/L 141   POTASSIUM mmol/L 3.7   CHLORIDE mmol/L 103   CO2 mmol/L 31.4*   BUN mg/dL 17   CREATININE mg/dL 1.00   GLUCOSE mg/dL 100*   CALCIUM mg/dL 10.3         Results from last 7 days   Lab Units 04/25/24  0833   WBC 10*3/mm3 3.31*    HEMOGLOBIN g/dL 12.8   HEMATOCRIT % 38.9   PLATELETS 10*3/mm3 129*                       I reviewed the patient's new clinical results.        Assessment:  1.  Multifactorial lower extremity edema  2.  Acute on chronic diastolic CHF  3.  Acute on chronic right-sided CHF  4.  Pulmonary hypertension  5.  COPD without acute exacerbation  6.  Persistent atrial fibrillation  7.  Status post [employer] dual-chamber pacemaker in 2018  8.  Obstructive sleep apnea  9.  Hypertension  10.  History of GI bleeding  11.  Thrombocytopenia  12.  Hypertension  13.  Immobility    Plan:  -Significant improvement in lower extremity edema.  Start Lasix 40 mg p.o. daily.      -She does qualify for an SGLT2 inhibitor.  I spoke to the patient and her daughter via speaker phone about this.  The patient's daughter is concerned because she is relatively immobile and elderly, and would be at higher risk for urinary tract infection with any incontinence.  She would like to wait on the SGLT2 inhibitor for now and start the Lasix first.  This can be further evaluated as an outpatient.    -I spoke with Dr. Leonard.  The patient is going to be discharged today.  I will arrange for follow-up with SHAHID Cowan, in 1 to 2 weeks.  She can likely have a BMP as an outpatient in 1 to 2 weeks as well.    Franklin Galvin MD  04/25/24  12:42 EDT        none of the above

## 2025-06-13 ENCOUNTER — TELEPHONE (OUTPATIENT)
Dept: OBSTETRICS AND GYNECOLOGY | Age: OVER 89
End: 2025-06-13
Payer: MEDICAID

## 2025-06-13 NOTE — TELEPHONE ENCOUNTER
"Caller: Concepcion Diana    Relationship: Emergency Contact    Best call back number: [phone] (DAUGHTER) - ONLY NUMBER ON FILE     Who is your current provider: Gordy Taylor MD    Is your current provider offboarding? NO    Who would you like your new provider to be: Minna Lara MD     What are your reasons for transferring care: 2ND OPINION     Additional notes:   PT DAUGHTER CONCEPCION DIANA CALLED REQUESTING TO SET MOM (PT) UP AN APPT WITH DR LARA AS THIS IS HER OBGYN AND HER MOTHER AGREED THAT IT WOULD BE A GOOD FIT AND WAY TO FIND A RESOLUTION SINCE SHE HAS BEEN HAVING THIS CONCERN FOR A LONG TIME NOW \"vaginal burning/irritation/pain\"     IF REQUEST IS APPROVED FOR [facility] PLEASE CALL TO ADVISE PT     THANK YOU     "

## 2025-06-13 NOTE — TELEPHONE ENCOUNTER
Rx Refill Note  Requested Prescriptions     Pending Prescriptions Disp Refills    vitamin B-12 (CYANOCOBALAMIN) 1000 MCG tablet [Pharmacy Med Name: VITAMIN B-12 1,000 MCG TABLET] 90 tablet 1     Sig: TAKE 1 TABLET BY MOUTH DAILY    Magnesium Oxide -Mg Supplement 400 (240 Mg) MG tablet [Pharmacy Med Name: MAGNESIUM OXIDE 400 MG TABLET] 90 tablet 1     Sig: TAKE 1 TABLET BY MOUTH DAILY    pantoprazole (PROTONIX) 40 MG EC tablet [Pharmacy Med Name: PANTOPRAZOLE SOD DR 40 MG TAB] 90 tablet 1     Sig: TAKE 1 TABLET BY MOUTH DAILY      Last office visit with prescribing clinician: 2/27/2025   Last telemedicine visit with prescribing clinician: Visit date not found   Next office visit with prescribing clinician: 6/30/2025                         Would you like a call back once the refill request has been completed: [] Yes [] No    If the office needs to give you a call back, can they leave a voicemail: [] Yes [] No    Matty Rose Rep  06/13/25, 12:42 EDT

## 2025-06-16 RX ORDER — PANTOPRAZOLE SODIUM 40 MG/1
40 TABLET, DELAYED RELEASE ORAL DAILY
Qty: 90 TABLET | Refills: 1 | Status: SHIPPED | OUTPATIENT
Start: 2025-06-16

## 2025-06-16 RX ORDER — LANOLIN ALCOHOL/MO/W.PET/CERES
1 CREAM (GRAM) TOPICAL DAILY
Qty: 90 TABLET | Refills: 1 | Status: SHIPPED | OUTPATIENT
Start: 2025-06-16

## 2025-06-16 RX ORDER — LANOLIN ALCOHOL/MO/W.PET/CERES
1000 CREAM (GRAM) TOPICAL DAILY
Qty: 90 TABLET | Refills: 1 | Status: SHIPPED | OUTPATIENT
Start: 2025-06-16

## 2025-06-17 RX ORDER — MESALAMINE 0.38 G/1
1.5 CAPSULE, EXTENDED RELEASE ORAL DAILY
Qty: 360 CAPSULE | Refills: 2 | Status: SHIPPED | OUTPATIENT
Start: 2025-06-17

## 2025-06-18 ENCOUNTER — APPOINTMENT (OUTPATIENT)
Dept: ULTRASOUND IMAGING | Facility: HOSPITAL | Age: OVER 89
End: 2025-06-18
Payer: MEDICARE

## 2025-06-18 ENCOUNTER — HOSPITAL ENCOUNTER (EMERGENCY)
Facility: HOSPITAL | Age: OVER 89
Discharge: HOME | End: 2025-06-18
Attending: STUDENT IN AN ORGANIZED HEALTH CARE EDUCATION/TRAINING PROGRAM | Admitting: STUDENT IN AN ORGANIZED HEALTH CARE EDUCATION/TRAINING PROGRAM
Payer: MEDICARE

## 2025-06-18 ENCOUNTER — TELEPHONE (OUTPATIENT)
Dept: CARDIOLOGY | Facility: HOSPITAL | Age: OVER 89
End: 2025-06-18
Payer: MEDICARE

## 2025-06-18 VITALS
SYSTOLIC BLOOD PRESSURE: 130 MMHG | TEMPERATURE: 98 F | BODY MASS INDEX: 29.19 KG/M2 | RESPIRATION RATE: 16 BRPM | HEIGHT: 64 IN | DIASTOLIC BLOOD PRESSURE: 69 MMHG | OXYGEN SATURATION: 99 % | WEIGHT: 171 LBS | HEART RATE: 60 BPM

## 2025-06-18 DIAGNOSIS — M79.89 PAIN AND SWELLING OF RIGHT LOWER LEG: Primary | ICD-10-CM

## 2025-06-18 DIAGNOSIS — M79.661 PAIN AND SWELLING OF RIGHT LOWER LEG: Primary | ICD-10-CM

## 2025-06-18 DIAGNOSIS — L03.115 CELLULITIS OF RIGHT LOWER LEG: ICD-10-CM

## 2025-06-18 PROCEDURE — 99284 EMERGENCY DEPT VISIT MOD MDM: CPT | Performed by: NURSE PRACTITIONER

## 2025-06-18 PROCEDURE — 93971 EXTREMITY STUDY: CPT

## 2025-06-18 PROCEDURE — 99284 EMERGENCY DEPT VISIT MOD MDM: CPT

## 2025-06-18 RX ORDER — CEPHALEXIN 500 MG/1
500 CAPSULE ORAL 3 TIMES DAILY
Qty: 21 CAPSULE | Refills: 0 | Status: SHIPPED | OUTPATIENT
Start: 2025-06-18 | End: 2025-06-25

## 2025-06-18 NOTE — FS[facility] PROVIDER NOTE
EMERGENCY DEPARTMENT ENCOUNTER    Room Number:  02/02  Date seen:  6/18/2025  Time seen: 14:24 EDT  PCP: Mega Esparza MD  Historian: Patient    Discussed/obtained information from independent historians: n/a    HPI:  Chief complaint: Painful knot to the back of the right lower leg  A complete HPI/ROS/PMH/PSH/SH/FH are unobtainable due to: Not applicable  Context:Brittany Villeda is a 89 y.o. female history of chronic back pain, hypertension, GERD, pulmonary hypertension, MGUS, history of atrial fib, heart failure who presents to the ED with c/o acute pain, mild redness and swelling with a palpable knot noted to the back of the right lower leg.  This was noticed today.  She does wear compression stockings and has done so for a very long time.  She has not had this problem before and it is not made better or worse by anything.  It is very tender.    External (non-ED) record review: I reviewed recent telephone encounter today.  The patient's daughter had called the heart failure clinic stating that the patient just left lymphedema clinic was fitted for compression stocking and they noticed a red knot on her leg they were concerned for blood clot and advised to come to the emergency room.  I also reviewed patient's recent heart failure clinic office visit notes dated 6/2/2025.  It is noted that the patient has bilateral leg edema, pulmonary hypertension and heart failure with preserved EF and obstructive sleep apnea.    Chronic or social conditions impacting care: Multiple comorbidities    ALLERGIES  Codeine, Amitriptyline, Amoxicillin-pot clavulanate, Aspirin, Carisoprodol-aspirin-codeine, Iodinated contrast media, Latex, Naproxen, Nsaids, Soma compound with codeine [carisoprodol-aspirin-codeine], Sulfa antibiotics, and Tramadol    PAST MEDICAL HISTORY  Active Ambulatory Problems     Diagnosis Date Noted    Non-toxic multinodular goiter 05/12/2016    Chronic midline low back pain with right-sided  sciatica 05/12/2016    Essential hypertension 05/12/2016    Gastroesophageal reflux disease without esophagitis 05/12/2016    Cataract 05/12/2016    Pulmonary hypertension 06/30/2016    Diastolic dysfunction 06/30/2016    HUGO (obstructive sleep apnea) 06/30/2016    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    MGUS (monoclonal gammopathy of unknown significance) 09/16/2016    Ulcerative rectosigmoiditis without complication 11/30/2017    Lichen sclerosus 08/23/2017    Heart block 10/30/2018    Sleep-related hypoxia 12/22/2018    Bradycardia 12/29/2018    History of atrial fibrillation 03/19/2019    Pacemaker 03/19/2019    Paroxysmal SVT (supraventricular tachycardia) 05/08/2019    History of chest pain 05/08/2019    Palpitations 05/08/2019    Paroxysmal atrial fibrillation 10/06/2019    Ascending aortic aneurysm 08/24/2020    Mediastinal lymphadenopathy 09/09/2020    Anemia     Obesity (BMI 30-39.9)     Generalized weakness 04/08/2021    Dysautonomia 04/09/2021    Hypercalcemia 04/11/2021    Hyperparathyroidism 04/28/2021    Choledocholithiasis 05/09/2021    Duodenal ulcer 05/13/2021    Hemorrhagic gastritis 05/13/2021    Exertional dyspnea 06/22/2021    COPD (chronic obstructive pulmonary disease)     Osteoarthritis of glenohumeral joint 07/12/2018    ICH (intracerebral hemorrhage) 08/05/2021    Lower GI bleed 11/01/2022    Thrombocytopenia 11/01/2022    Lichen sclerosus of female genitalia 03/13/2023    Senile atrophic vaginitis 03/13/2023    Left inguinal hernia 08/23/2023    Vulvar pain 02/01/2024    Bilateral lower extremity edema 04/24/2024    Chronic heart failure with preserved ejection fraction 04/24/2024    Acute renal insufficiency 04/25/2024    Dysfunction of right cardiac ventricle 05/29/2024    COVID-19 virus infection 12/20/2024    Shortness of breath 01/18/2025    Acute lower GI bleeding 02/10/2025     Resolved Ambulatory Problems     Diagnosis Date Noted    Abnormal weight loss 05/12/2016    Tachycardia  05/12/2016    Sciatica 05/12/2016    Nausea and vomiting 05/12/2016    Hyperthyroidism 05/12/2016    Fatigue 05/12/2016    Dizziness 05/12/2016    Diarrhea 05/12/2016    Abnormal thyroid stimulating hormone (TSH) level 05/12/2016    Abdominal pain 05/12/2016    Abnormal EKG 06/30/2016    Leukopenia 09/12/2016    Other chest pain 12/24/2017    Shoulder arthritis 01/28/2019    Rectal bleeding 10/15/2019    Arthritis 12/13/2019    Immobility 11/20/2020    Left knee pain 11/20/2020    Chronic anticoagulation 11/20/2020    Hemarthrosis of left knee 11/20/2020    Acute UTI (urinary tract infection) 04/08/2021    Weakness of both lower extremities 04/09/2021    Spondylosis of lumbosacral region without myelopathy or radiculopathy 04/09/2021    Macrocytosis 04/11/2021    Arthritis of right wrist 04/13/2021    Hypomagnesemia 04/13/2021    Essential hypertension 05/10/2021    Hyperglycemia 05/10/2021    Epigastric pain 05/12/2021    Functional quadriplegia 11/20/2020    Hip pain 04/12/2018    Other malaise and fatigue 05/25/2021    Shoulder pain 04/12/2018    Subdural hematoma 08/05/2021    Melena 09/29/2022    GI bleed 11/01/2022    Hypokalemia 04/26/2024     Past Medical History:   Diagnosis Date    Abdominal aortic aneurysm     Arrhythmia     Asthma     CHF (congestive heart failure) 04/2023    Colitis     GERD (gastroesophageal reflux disease)     Hypertension     Hypertensive heart disease     Inguinal hernia     Kidney stone     Nephrolithiasis     Peptic ulceration     Pressure ulcer     PSVT (paroxysmal supraventricular tachycardia)     Rectal bleed     Sleep apnea     Systemic hypertension     Ventricular tachycardia        PAST SURGICAL HISTORY  Past Surgical History:   Procedure Laterality Date    BACK SURGERY      lumbar fusion    PAWAN HOLE Left 08/08/2021    Procedure: Left-sided pawan holes for evacuation of subdural hematoma;  Surgeon: Gustavo Callaway MD;  Location: [facility];  Service: Neurosurgery;   Laterality: Left;    CARDIAC ELECTROPHYSIOLOGY PROCEDURE N/A 11/07/2018    Procedure: Pacemaker DC new   [place];  Surgeon: Jesus Granda MD;  Location: [facility] CATH INVASIVE LOCATION;  Service: Cardiology    CHOLECYSTECTOMY      COLONOSCOPY  06/16/2014    colitis, cryptitis,  tics, NBIH, TA w/low grade dysplasia    COLONOSCOPY N/A 10/28/2019    Procedure: COLONOSCOPY WITH COLD AND HOT POLYPECTOMIES;  Surgeon: Micaela English MD;  [facility];  Service: Gastroenterology    ENDOSCOPY N/A 05/13/2021    Procedure: ESOPHAGOGASTRODUODENOSCOPY with BX;  Surgeon: Stefan Mcfadden MD;  [facility];  Service: Gastroenterology;  Laterality: N/A;  EPIGASTRIC PAIN  --DUODENAL ULCER, HEMORRHAGIC GASTRITIS, HIATAL HERNIA     ENDOSCOPY N/A 10/11/2022    Procedure: ESOPHAGOGASTRODUODENOSCOPY;  Surgeon: Micaela English MD;  Location: [facility] ENDOSCOPY;  Service: Gastroenterology;  Laterality: N/A;  PRE- MELENA  POST- ESOPHAGITIS    HEMORRHOIDECTOMY      HERNIA REPAIR      umbilical    HYSTERECTOMY      INGUINAL HERNIA REPAIR Left 09/01/2023    Procedure: INGUINAL HERNIA REPAIR LEFT;  Surgeon: Antonio Foster MD;  Location:  [facility];  Service: General;  Laterality: Left;    JOINT REPLACEMENT Bilateral     KNEES    MYOMECTOMY      PACEMAKER IMPLANTATION      SHOULDER SURGERY      SIGMOIDOSCOPY N/A 11/02/2022    Procedure: SIGMOIDOSCOPY FLEXIBLE WITH COLD BIOPSIES AND ASPIRATE;  Surgeon: Micaela English MD;  Location: [facility] ENDOSCOPY;  Service: Gastroenterology;  Laterality: N/A;  PRE- RECTAL BLEEDING  POST- HEMORRHOIDS, DIVERTICULOSIS, COLITIS    SINUS SURGERY      TOE NAIL AMPUTATION  03/04/2019    TONSILLECTOMY      UPPER GASTROINTESTINAL ENDOSCOPY         FAMILY HISTORY  Family History   Problem Relation Age of Onset    Diabetes Mother     Breast cancer Sister     Kidney cancer Sister     Heart disease Sister     Prostate cancer Brother     Prostate cancer Brother     Prostate  cancer Brother     Ruma Hyperthermia Neg Hx        SOCIAL HISTORY  Social History     Socioeconomic History    Marital status:     Number of children: 10    Years of education: High School   Tobacco Use    Smoking status: Former     Current packs/day: 0.00     Average packs/day: 1.5 packs/day for 12.0 years (18.0 ttl pk-yrs)     Types: Cigarettes     Start date:      Quit date:      Years since quittin.5     Passive exposure: Past    Smokeless tobacco: Never    Tobacco comments:     QUIT SMOKING    Vaping Use    Vaping status: Never Used   Substance and Sexual Activity    Alcohol use: No     Comment: caffeine - decaf coffee    Drug use: Never    Sexual activity: Defer       REVIEW OF SYSTEMS  Review of Systems    All systems reviewed and negative except for those discussed in HPI.     PHYSICAL EXAM    I have reviewed the triage vital signs and nursing notes.  Vitals:    25 1600   BP: 130/69   Pulse: 60   Resp:    Temp:    SpO2: 99%     Physical Exam  Musculoskeletal:        Legs:       Comments: Painful lump as indicated to the back of the right leg.  It is quite superficial.  There is some mild erythema around the area but no obvious streaking.  Her compartments are normal.  She does have some chronic appearing bilateral lower extremity edema.  Distal pulses easily palpated 2+     GENERAL: not distressed, well-dressed and groomed, elderly  HENT: nares patent  EYES: no scleral icterus  NECK: no ROM limitations  CV: regular rhythm, regular rate, no murmur  RESPIRATORY: normal effort  ABDOMEN: soft  : deferred  MUSCULOSKELETAL: See above  NEURO: alert, moves all extremities, follows commands  SKIN: warm, dry    RADIOLOGY RESULTS  US Venous Doppler Lower Extremity Right (duplex)  Result Date: 2025  Patient: PALOMA CAPPS  Time Out: 15:39 Exam(s): US VENOUS RIGHT LOWER EXTREMITY EXAM:   US Duplex Right Lower Extremity Veins CLINICAL HISTORY:    Reason for exam: hardened knot,  redness, concern for blood clot. H o afib but not on anticoagulation. TECHNIQUE:   Real-time duplex ultrasound scan of the right lower extremity veins integrating B-mode two-dimensional vascular structure, Doppler spectral analysis, color flow Doppler imaging and compression. COMPARISON:   No relevant prior studies available. FINDINGS:   Deep veins:  Unremarkable.  No DVT in the visualized common femoral, femoral, proximal deep femoral or popliteal veins.  The veins demonstrate normal color flow, are normally compressible, with normal phasic flow and or augmentation response.   Superficial veins:  Unremarkable.  No thrombus in the visualized great saphenous vein.   Soft tissues:  No acute findings.  No popliteal cyst. IMPRESSION:       Normal right lower extremity duplex venous ultrasound.     Electronically signed by Kristofer Perera MD on 06-18-25 at 1539       Ordered the above noted radiological studies.  Independently interpreted by me.  My findings will be discussed in the medical decision section below.     PROGRESS, DATA ANALYSIS, CONSULTS AND MEDICAL DECISION MAKING    Please note that this section constitutes my independent interpretation of clinical data including lab results, radiology, EKG's.  This constitutes my independent professional opinion regarding differential diagnosis and management of this patient.  It may include any factors such as history from outside sources, review of external records, social determinants of health, management of medications, response to those treatments, and discussions with other providers.    ED Course as of 06/18/25 1624   Wed Jun 18, 2025   1522 Ultrasound here for exam.    [EW]      ED Course User Index  [EW] Shasha Lundberg APRN     Orders placed during this visit:  Orders Placed This Encounter   Procedures    US Venous Doppler Lower Extremity Right (duplex)    Undress and Gown    ED Acknowledgement Form Needed;            Medical Decision Making  Problems  Addressed:  Cellulitis of right lower leg: complicated acute illness or injury  Pain and swelling of right lower leg: complicated acute illness or injury    Risk  Prescription drug management.    Patient presents with painful lump behind the right leg noticed today.  She is concerned for DVT.  Other considerations include a tender nodule, abscess, cellulitis.  Ultrasound study negative for any DVT and does not focally show any fluid collections therefore my suspicion for abscess is quite low.  Discussed pain management with patient to take some Tylenol over-the-counter and also prescribed Keflex to see if this will help her discomfort and redness.        DIAGNOSIS  Final diagnoses:   Pain and swelling of right lower leg   Cellulitis of right lower leg          Medication List        New Prescriptions      cephalexin 500 MG capsule  Commonly known as: KEFLEX  Take 1 capsule by mouth 3 (Three) Times a Day for 7 days.            Changed      spironolactone 25 MG tablet  Commonly known as: ALDACTONE  Take 1 tablet by mouth Every Other Day.  What changed:   how much to take  when to take this               Where to Get Your Medications        These medications were sent to [facility] [id number] - [place], [place] - [address] [facility] - [phone] [facility] [phone] [address], [place] [id number]      Phone: [phone]   cephalexin 500 MG capsule         FOLLOW-UP  Mega Esparza MD  [address]  [facility] [address]  [place] [id number] [phone]    Schedule an appointment as soon as possible for a visit in 3 days  As needed, If symptoms worsen        Latest Documented Vital Signs:  As of 16:24 EDT  BP- 130/69 HR- 60 Temp- 98 °F (36.7 °C) (Oral) O2 sat- 99%    Appropriate PPE utilized throughout this patient encounter to include mask, if indicated, per current protocol. Hand hygiene was performed before donning PPE and after removal when leaving the room.    Please note that  portions of this were completed with a voice recognition program.     Note Disclaimer: At [facility], we believe that sharing information builds trust and better relationships. You are receiving this note because you are receiving care at [facility] or recently visited. It is possible you will see health information before a provider has talked with you about it. This kind of information can be easy to misunderstand. To help you fully understand what it means for your health, we urge you to discuss this note with your provider.

## 2025-06-18 NOTE — DISCHARGE INSTRUCTIONS
Antibiotics as directed    Tylenol for pain    Ultrasound negative for any deep vein clot    Return Precautions    Although you are being discharged from the ED today, I encourage you to return for worsening symptoms.  Things can, and do, change such that treatment at home with medication may not be adequate.      Specifically, return for any of the following:    Chest pain, shortness of breath, pain or nausea and vomiting not controlled by medications provided.    Please make a follow up with your Primary Care Provider for a blood pressure recheck.

## 2025-06-18 NOTE — TELEPHONE ENCOUNTER
Patient's daughter Amina called  Clinic this afternoon. She states that patient just left the Lymphedema clinic & was fitted for compression stockings. They noticed a red knot on her leg, it is warm to touch. She is concerned for a blood clot.  Patient was directed to go to the ER.    Amina verbalized understanding. No questions at this time.    Thanks,  Rosemary THOMAS [facility]

## 2025-06-20 ENCOUNTER — OFFICE VISIT (OUTPATIENT)
Dept: OBSTETRICS AND GYNECOLOGY | Age: OVER 89
End: 2025-06-20
Payer: MEDICARE

## 2025-06-20 ENCOUNTER — DOCUMENTATION (OUTPATIENT)
Dept: OCCUPATIONAL THERAPY | Facility: HOSPITAL | Age: OVER 89
End: 2025-06-20
Payer: MEDICARE

## 2025-06-20 VITALS
DIASTOLIC BLOOD PRESSURE: 68 MMHG | BODY MASS INDEX: 28.89 KG/M2 | HEIGHT: 64 IN | SYSTOLIC BLOOD PRESSURE: 108 MMHG | WEIGHT: 169.2 LBS

## 2025-06-20 DIAGNOSIS — I89.0 LYMPHEDEMA, NOT ELSEWHERE CLASSIFIED: Primary | ICD-10-CM

## 2025-06-20 DIAGNOSIS — Z12.31 SCREENING MAMMOGRAM FOR BREAST CANCER: Primary | ICD-10-CM

## 2025-06-20 DIAGNOSIS — N94.89 VULVAR BURNING: Primary | ICD-10-CM

## 2025-06-20 NOTE — PROGRESS NOTES
Subjective     Chief Complaint   Patient presents with    Gynecologic Exam     NEW Gyn. Patient has some concerns about vaginal dermatitis, it burns and is very comfortable. She was given a prescription for Tacrolimus Ointment, and it is no longer helping.        History of Present Illness    Brittany Villeda is a 89 y.o.  who presents as a new pt for GYN visit. She has not had a breast exam in several years and desires to proceed with this. She also desires mammogram.  She has been evaluated by GYN MD, GYN NP and 2 dermatologists. She has tried topical estrogen, topical steroids, topical anti fungals without any improvement in symptoms. Her daughter notes derm MD has prescribed valtrex, tacrolimus and topical lidocaine. They note this initially provided some relief of irritation but she still has burning on vulva and seeks additional opinion. They note the derm MD did a biopsy. Records are not available.     Obstetric History:  OB History          11    Para   11    Term   11            AB        Living   9         SAB        IAB        Ectopic        Molar        Multiple        Live Births   10               Menstrual History:     No LMP recorded (lmp unknown). Patient has had a hysterectomy.             Received Gardasil immunization: no  Perform regular self breast exam: yes  Family history of uterine or ovarian cancer: no  Family History of colon cancer: no  Family history of breast cancer: no    Mammogram: ordered.  Colonoscopy: not indicated.      Exercise: not active      The following portions of the patient's history were reviewed and updated as appropriate: allergies, current medications, past family history, past medical history, past social history, past surgical history, and problem list.    Review of Systems    Review of Systems   Constitutional: Negative for fever  Respiratory: Negative for acute shortness of breath.    Gastrointestinal: Negative for abdominal pain.  "  Genitourinary: Positive for vulvar burning, Negative for vag bleeding  Psychiatric/Behavioral: Negative for dysphoric mood.         Objective   Physical Exam    /68 (BP Location: Left arm, Patient Position: Sitting, Cuff Size: Adult)   Ht 162.6 cm (64\")   Wt 76.7 kg (169 lb 3.2 oz)   LMP  (LMP Unknown)   BMI 29.04 kg/m²   General:   Alert, in no distress   Heart: regular rate and rhythm   Lungs: Normal respiratory effort   Breast: Inspection is negative. Left breast is without masses, retractions, nipple discharge or axillary adenopathy. Right breast is without masses, retractions, nipple discharge or axillary adenopathy.     Neck: supple   Abdomen: Soft, no tenderness or guarding   Pelvis: External genitalia: hypopigmentation of clitoral mascorro and labia minora, no thickening or ulcerations. No focal lesions noted on posterior perineum at site of symptoms.   Urinary system: urethral meatus normal  Vaginal: atrophic mucosa without focal lesions  Cervix: removed surgically, cuff without lesions  Adnexa: no tenderness/masses noted  Uterus: removed surgically   Extremities: Normal without edema   Neurologic: Alert and oriented   Psychiatric: Normal affect, judgment and mood     Assessment & Plan   Diagnoses and all orders for this visit:    1. Vulvar burning (Primary)      Pt has been treated by gyn MD, NP and derm MD. Her daughter notes some improvement in symptoms after treatment with tacrolimus, valtrex and topical lidocaine. Requested records for review. Reviewed avoidance of all potential allergens and products that can worsen irritation. Pt denies urinary incontinence but has been wearing incontinence products in case she has difficulty getting to bathroom. Recommend cotton underwear and avoiding pads/incontinence products when able and discussed.     Pt desires mammogram and ordered.     All questions answered. Recommend f/u 4 wks.       33 min spent with pt, reviewing chart, examining pt, providing " counseling and documenting visit

## 2025-06-20 NOTE — THERAPY TREATMENT NOTE
Outpatient Occupational Therapy Lymphedema Treatment Note       Patient Name: Brittany Villeda  : 1935  MRN: [id number]  Today's Date: 2025      Visit Date: 2025    Patient Active Problem List   Diagnosis    Non-toxic multinodular goiter    Chronic midline low back pain with right-sided sciatica    Essential hypertension    Gastroesophageal reflux disease without esophagitis    Cataract    Pulmonary hypertension    Diastolic dysfunction    HUGO (obstructive sleep apnea)    Trifascicular block    MGUS (monoclonal gammopathy of unknown significance)    Ulcerative rectosigmoiditis without complication    Lichen sclerosus    Heart block    Sleep-related hypoxia    Bradycardia    History of atrial fibrillation    Pacemaker    Paroxysmal SVT (supraventricular tachycardia)    History of chest pain    Palpitations    Paroxysmal atrial fibrillation    Ascending aortic aneurysm    Mediastinal lymphadenopathy    Anemia    Obesity (BMI 30-39.9)    Generalized weakness    Dysautonomia    Hypercalcemia    Hyperparathyroidism    Choledocholithiasis    Duodenal ulcer    Hemorrhagic gastritis    Exertional dyspnea    COPD (chronic obstructive pulmonary disease)    Osteoarthritis of glenohumeral joint    ICH (intracerebral hemorrhage)    Lower GI bleed    Thrombocytopenia    Lichen sclerosus of female genitalia    Senile atrophic vaginitis    Left inguinal hernia    Vulvar pain    Bilateral lower extremity edema    Chronic heart failure with preserved ejection fraction    Acute renal insufficiency    Dysfunction of right cardiac ventricle    COVID-19 virus infection    Shortness of breath    Acute lower GI bleeding        Past Medical History:   Diagnosis Date    Abdominal aortic aneurysm     Anemia     Arrhythmia     Arthritis     Asthma     Bradycardia     CHF (congestive heart failure) 2023    Choledocholithiasis 2021    Colitis     Diastolic dysfunction     Essential hypertension 2016    GERD  (gastroesophageal reflux disease)     Heart block     Hypertension     Hypertensive heart disease     Hyperthyroidism     REPORTS ENLARGED    Inguinal hernia     Kidney stone     Leukopenia     MGUS (monoclonal gammopathy of unknown significance)     Nephrolithiasis     Pacemaker     Paroxysmal atrial fibrillation     Peptic ulceration     Pressure ulcer     REPORTS ON COCCYX. INSTR TO NOTIFY DR BAIRES'S OFFICE    PSVT (paroxysmal supraventricular tachycardia)     Pulmonary hypertension     Rectal bleed     Sleep apnea     NO DEVICE    Subdural hematoma     Systemic hypertension     Trifascicular block     Ulcerative rectosigmoiditis without complication     Ventricular tachycardia     nonsustained        Past Surgical History:   Procedure Laterality Date    BACK SURGERY      lumbar fusion    DUSTY HOLE Left 08/08/2021    Procedure: Left-sided dusty holes for evacuation of subdural hematoma;  Surgeon: Gustavo Callaway MD;  Location: [facility] MAIN OR;  Service: Neurosurgery;  Laterality: Left;    CARDIAC ELECTROPHYSIOLOGY PROCEDURE N/A 11/07/2018    Procedure: Pacemaker DC new   [place];  Surgeon: Jesus Granda MD;  Location: [facility] CATH INVASIVE LOCATION;  Service: Cardiology    CHOLECYSTECTOMY      COLONOSCOPY  06/16/2014    colitis, cryptitis,  tics, NBIH, TA w/low grade dysplasia    COLONOSCOPY N/A 10/28/2019    Procedure: COLONOSCOPY WITH COLD AND HOT POLYPECTOMIES;  Surgeon: Micaela English MD;  Location: [facility] ENDOSCOPY;  Service: Gastroenterology    ENDOSCOPY N/A 05/13/2021    Procedure: ESOPHAGOGASTRODUODENOSCOPY with BX;  Surgeon: Stefan Mcfadden MD;  Location: [facility] ENDOSCOPY;  Service: Gastroenterology;  Laterality: N/A;  EPIGASTRIC PAIN  --DUODENAL ULCER, HEMORRHAGIC GASTRITIS, HIATAL HERNIA     ENDOSCOPY N/A 10/11/2022    Procedure: ESOPHAGOGASTRODUODENOSCOPY;  Surgeon: Micaela English MD;  Location: [facility] ENDOSCOPY;  Service: Gastroenterology;  Laterality: N/A;  PRE- MELENA  POST-  ESOPHAGITIS    HEMORRHOIDECTOMY      HERNIA REPAIR      umbilical    HYSTERECTOMY      INGUINAL HERNIA REPAIR Left 09/01/2023    Procedure: INGUINAL HERNIA REPAIR LEFT;  Surgeon: Antonio Foster MD;  Location: [facility] OR [facility];  Service: General;  Laterality: Left;    JOINT REPLACEMENT Bilateral     KNEES    MYOMECTOMY      PACEMAKER IMPLANTATION      SHOULDER SURGERY      SIGMOIDOSCOPY N/A 11/02/2022    Procedure: SIGMOIDOSCOPY FLEXIBLE WITH COLD BIOPSIES AND ASPIRATE;  Surgeon: Micaela English MD;  Location: [facility] ENDOSCOPY;  Service: Gastroenterology;  Laterality: N/A;  PRE- RECTAL BLEEDING  POST- HEMORRHOIDS, DIVERTICULOSIS, COLITIS    SINUS SURGERY      TOE NAIL AMPUTATION  03/04/2019    TONSILLECTOMY      UPPER GASTROINTESTINAL ENDOSCOPY           Visit Dx:      ICD-10-CM ICD-9-CM   1. Lymphedema, not elsewhere classified  I89.0 457.1                    OT Assessment/Plan       Row Name 06/20/25 1453          OT Assessment    Assessment Comments For phase 1 acute decongestive phase of treatment leading to phase 2 maintenance phase of lymphedema treatment pt. needs the following:  MTM/RTW  Product  Length  Size  Options  Side  Quantity     RTW  Circaid reduction kit lower leg  short  regular  right  X1     RTW  Circaid reduction kit lower leg  short  regular  left  X1     RTW  Customized interlocking RTW  regular  Right  X1     RTW  Customized interlocking RTW  regular  Left  X1     RTW  Reduction toe caps  medium  right  X2     RTW  Reduction toe caps  medium  Left  X2           A gradient compression wraps lower leg bilaterally with adjustable velcro straps 30-50mmHg are needed for daytime use due to the severity of patient's lymphedema. The interlocking ankle foot wraps are needed to provide adequate compression distally at bilateral ankles and feet due to increased edema accumulation. The Reduction kit toe caps are needed to facilitate bilateral foot/ toe edema reduction because of increased toe edema.  The requested garment wraps are essential to the patient's long term lymphedema management and are a part of the care plan for discharge from treatment.  -MAUYR               User Key  (r) = Recorded By, (t) = Taken By, (c) = Cosigned By      Initials Name Provider Type    Faina Arnett, JONATHANR Occupational Therapist                                               Time Calculation:                         KYLER Hernández  6/20/2025

## 2025-06-30 ENCOUNTER — HOSPITAL ENCOUNTER (OUTPATIENT)
Dept: CARDIOLOGY | Facility: HOSPITAL | Age: OVER 89
Discharge: HOME | End: 2025-06-30
Admitting: NURSE PRACTITIONER
Payer: MEDICARE

## 2025-06-30 ENCOUNTER — OFFICE VISIT (OUTPATIENT)
Dept: FAMILY MEDICINE CLINIC | Facility: CLINIC | Age: OVER 89
End: 2025-06-30
Payer: MEDICARE

## 2025-06-30 VITALS
WEIGHT: 171 LBS | HEIGHT: 64 IN | BODY MASS INDEX: 29.19 KG/M2 | DIASTOLIC BLOOD PRESSURE: 59 MMHG | HEART RATE: 67 BPM | OXYGEN SATURATION: 99 % | SYSTOLIC BLOOD PRESSURE: 95 MMHG

## 2025-06-30 VITALS
HEIGHT: 64 IN | OXYGEN SATURATION: 100 % | WEIGHT: 171 LBS | SYSTOLIC BLOOD PRESSURE: 97 MMHG | TEMPERATURE: 97.5 F | BODY MASS INDEX: 29.19 KG/M2 | DIASTOLIC BLOOD PRESSURE: 55 MMHG | HEART RATE: 61 BPM

## 2025-06-30 DIAGNOSIS — I50.32 CHRONIC DIASTOLIC CONGESTIVE HEART FAILURE: ICD-10-CM

## 2025-06-30 DIAGNOSIS — K51.30 ULCERATIVE RECTOSIGMOIDITIS WITHOUT COMPLICATION: ICD-10-CM

## 2025-06-30 DIAGNOSIS — Z00.00 MEDICARE ANNUAL WELLNESS VISIT, SUBSEQUENT: Primary | ICD-10-CM

## 2025-06-30 DIAGNOSIS — I51.9 DYSFUNCTION OF RIGHT CARDIAC VENTRICLE: ICD-10-CM

## 2025-06-30 DIAGNOSIS — I50.32 CHRONIC HEART FAILURE WITH PRESERVED EJECTION FRACTION: ICD-10-CM

## 2025-06-30 DIAGNOSIS — I27.20 PULMONARY HYPERTENSION: Primary | ICD-10-CM

## 2025-06-30 PROCEDURE — 1126F AMNT PAIN NOTED NONE PRSNT: CPT | Performed by: FAMILY MEDICINE

## 2025-06-30 PROCEDURE — 99214 OFFICE O/P EST MOD 30 MIN: CPT | Performed by: NURSE PRACTITIONER

## 2025-06-30 PROCEDURE — G0463 HOSPITAL OUTPT CLINIC VISIT: HCPCS

## 2025-06-30 PROCEDURE — G2211 COMPLEX E/M VISIT ADD ON: HCPCS | Performed by: FAMILY MEDICINE

## 2025-06-30 PROCEDURE — 99213 OFFICE O/P EST LOW 20 MIN: CPT | Performed by: FAMILY MEDICINE

## 2025-06-30 PROCEDURE — 1170F FXNL STATUS ASSESSED: CPT | Performed by: FAMILY MEDICINE

## 2025-06-30 PROCEDURE — G0439 PPPS, SUBSEQ VISIT: HCPCS | Performed by: FAMILY MEDICINE

## 2025-06-30 PROCEDURE — 96160 PT-FOCUSED HLTH RISK ASSMT: CPT | Performed by: FAMILY MEDICINE

## 2025-06-30 RX ORDER — SPIRONOLACTONE 25 MG/1
12.5 TABLET ORAL DAILY
Start: 2025-06-30

## 2025-06-30 RX ORDER — FUROSEMIDE 40 MG/1
20 TABLET ORAL DAILY
Qty: 90 TABLET | Refills: 3 | Status: SHIPPED | OUTPATIENT
Start: 2025-06-30

## 2025-06-30 NOTE — LETTER
June 30, 2025     Mary Grace Culp MD  [address] [address]    Patient: Brittany Villeda   YOB: 1935   Date of Visit: 6/30/2025     Dear Mary Grace Culp MD:       Thank you for referring Brittany Villeda to me for evaluation. Below are the relevant portions of my assessment and plan of care.    If you have questions, please do not hesitate to call me. I look forward to following Brittany along with you.         Sincerely,        SHAHID Bazan        CC: MD Obed Calvert, SHAHID Holly  06/30/25 1208  Signed    [facility]    Franklin Galvin MD  [address] [phone]    Thank you for asking me to see Brittany Villeda.     Congestive Heart Failure  Pertinent negatives include no chest pain or shortness of breath.       1.  Pulmonary hypertension  2.  HFpEF    Subjective     Brittany Cliftons a 89 y.o. female who presents today for pulmonary hypertension, HFpEF.   The patient's most recent 2D TTE showed evidence of pulmonary hypertension and right-sided involvement.  She does have biatrial dilatation.  She was diagnosed with HUGO. She does not use a CPAP. She was diagnosed with complete heart block in the past and required placement of a PPM--Status post [employer] dual-chamber pacemaker in 2018 .  She also was diagnosed with COPD.  She does not routinely follow with pulmonary medicine.  She also was diagnosed with MGUS.      Since last visit patient had admission in December 20, 2024 with COVID-19.  She was treated with Paxlovid and was able to be discharged.    Patient had hospitalization 1/18/2025 to 1/20/2025.  Acute decompensated Heart falure Hospitalization.  She received IV Lasix for diuresis with improvement.  She was discharged on home medications-Lasix 20 mg daily, spironolactone 12.5 mg daily. Feels like this was due to not having medicaitons.     Stable exercise  intolerance.  Denies any abdominal distention.    She denies any new or worsening exertional dyspnea/shortness of breath  At last visit due to increasing leg edema and weight her diuretic was increased.  Denies any PND.   Reports no change in leg edema, did lose 2 pounds since last visit.      Review of Systems - Review of Systems   Constitutional: Positive for weight gain. Negative for weight loss.   Cardiovascular:  Positive for leg swelling. Negative for chest pain, dyspnea on exertion, orthopnea, paroxysmal nocturnal dyspnea and syncope.   Respiratory:  Negative for cough, shortness of breath, sleep disturbances due to breathing and snoring.    Gastrointestinal:  Negative for bloating.   Neurological:  Negative for dizziness, light-headedness and weakness.          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-sided sciatica   • Essential hypertension   • Gastroesophageal reflux disease without esophagitis   • Cataract   • Pulmonary hypertension   • Diastolic dysfunction   • HUGO (obstructive sleep apnea)   • Trifascicular block   • MGUS (monoclonal gammopathy of unknown significance)   • Ulcerative rectosigmoiditis without complication   • Lichen sclerosus   • Heart block   • Sleep-related hypoxia   • Bradycardia   • History of atrial fibrillation   • Pacemaker   • Paroxysmal SVT (supraventricular tachycardia)   • History of chest pain   • Palpitations   • Paroxysmal atrial fibrillation   • Ascending aortic aneurysm   • Mediastinal lymphadenopathy   • Anemia   • Obesity (BMI 30-39.9)   • Generalized weakness   • Dysautonomia   • Hypercalcemia   • Hyperparathyroidism   • Choledocholithiasis   • Duodenal ulcer   • Hemorrhagic gastritis   • Exertional dyspnea   • COPD (chronic obstructive pulmonary disease)   • Osteoarthritis of glenohumeral joint   • ICH (intracerebral hemorrhage)   • Lower GI bleed   • Thrombocytopenia   • Lichen sclerosus  of female genitalia   • Senile atrophic vaginitis   • Left inguinal hernia   • Vulvar pain   • Bilateral lower extremity edema   • Chronic heart failure with preserved ejection fraction   • Acute renal insufficiency   • Dysfunction of right cardiac ventricle   • COVID-19 virus infection   • Shortness of breath   • Acute lower GI bleeding     family history includes Breast cancer in her sister; Diabetes in her mother; Heart disease in her sister; Kidney cancer in her sister; Prostate cancer in her brother, brother, and brother.   reports that she quit smoking about 60 years ago. Her smoking use included cigarettes. She started smoking about 72 years ago. She has a 18 pack-year smoking history. She has been exposed to tobacco smoke. She has never used smokeless tobacco. She reports that she does not drink alcohol and does not use drugs.  Allergies   Allergen Reactions   • Codeine Hallucinations     Tolerates hydromorphone   • Amitriptyline Rash   • Amoxicillin-Pot Clavulanate Rash   • Aspirin Unknown - Low Severity     Patient doesn't know why   • Carisoprodol-Aspirin-Codeine Palpitations   • Iodinated Contrast Media Rash   • Latex Rash   • Naproxen Rash   • Nsaids Unknown - Low Severity     UNSURE OF REACTION   • Soma Compound With Codeine [Carisoprodol-Aspirin-Codeine] Rash   • Sulfa Antibiotics Rash   • Tramadol Palpitations     heart races      Physical Activity: Sufficiently Active (4/26/2024)    Exercise Vital Sign    • Days of Exercise per Week: 5 days    • Minutes of Exercise per Session: 30 min          Current Outpatient Medications:   •  acetaminophen (TYLENOL) 500 MG tablet, Take 1 tablet by mouth 2 (Two) Times a Day., Disp: , Rfl:   •  docusate sodium (COLACE) 100 MG capsule, Take 1 capsule by mouth Daily., Disp: , Rfl:   •  ELDERBERRY PO, Take 2 tablets by mouth Daily., Disp: , Rfl:   •  furosemide (LASIX) 40 MG tablet, Take 0.5 tablets by mouth Daily. Taking 20mg, Disp: 90 tablet, Rfl: 3  •  lidocaine  (XYLOCAINE) 5 % ointment, Apply  topically to the appropriate area as directed Daily As Needed for Mild Pain (for rectal discomfort)., Disp: 50 g, Rfl: 5  •  Magnesium Oxide -Mg Supplement 400 (240 Mg) MG tablet, TAKE 1 TABLET BY MOUTH DAILY, Disp: 90 tablet, Rfl: 1  •  mesalamine (APRISO) 0.375 g 24 hr capsule, TAKE FOUR CAPSULES BY MOUTH DAILY, Disp: 360 capsule, Rfl: 2  •  mesalamine (CANASA) 1000 MG suppository, Insert 1 suppository into the rectum At Night As Needed (pain/bleeding)., Disp: 30 each, Rfl: 2  •  pantoprazole (PROTONIX) 40 MG EC tablet, TAKE 1 TABLET BY MOUTH DAILY, Disp: 90 tablet, Rfl: 1  •  spironolactone (ALDACTONE) 25 MG tablet, Take 0.5 tablets by mouth Daily., Disp: , Rfl:   •  tacrolimus (PROTOPIC) 0.1 % ointment, , Disp: , Rfl:   •  valACYclovir (VALTREX) 1000 MG tablet, Take 1 tablet by mouth Daily., Disp: , Rfl:   •  vitamin B-12 (CYANOCOBALAMIN) 1000 MCG tablet, TAKE 1 TABLET BY MOUTH DAILY, Disp: 90 tablet, Rfl: 1    Objective  Vital Sign Review:   Vitals:    06/30/25 1114   BP: 95/59   Pulse: 67   SpO2: 99%       Body mass index is 29.34 kg/m².      06/30/25  1114   Weight: 77.6 kg (171 lb)         Physical Exam:  Constitutional:       Appearance: Normal and healthy appearance.   Neck:      Vascular: No carotid bruit or JVD. JVD normal.   Pulmonary:      Effort: Pulmonary effort is normal.      Breath sounds: Normal breath sounds.   Cardiovascular:      PMI at left midclavicular line. Normal rate. Regular rhythm.   Pulses:     Intact distal pulses.   Edema:     Peripheral edema present.     Pretibial: bilateral 2+ edema of the pretibial area.     Ankle: bilateral 2+ edema of the ankle.  Abdominal:      Palpations: Abdomen is soft.      Tenderness: There is no abdominal tenderness.   Skin:     General: Skin is warm and dry.   Neurological:      General: No focal deficit present.      Mental Status: Alert and oriented to person, place and time.   Psychiatric:         Behavior: Behavior  is cooperative.          DATA REVIEWED:   EKG:  ECG 12 Lead     Date/Time: 8/12/2024 1:25 PM  Performed by: Sydney Gilliam APRN     Authorized by: Sydney Gilliam APRN  Comparison: compared with previous ECG   Similar to previous ECG  Rhythm: atrial fibrillation and paced  Rate: normal  BPM: 67  Conduction: right bundle branch block  QRS axis: normal  Comments: Similar to prior    ---------------------------------------------------  ECHO:    Results for orders placed during the hospital encounter of 04/23/24    Adult Transthoracic Echo Complete W/ Cont if Necessary Per Protocol    Interpretation Summary  •  Left ventricular systolic function is normal. Left ventricular ejection fraction appears to be 61 - 65%.  •  Left ventricular diastolic function was indeterminate.  •  The right ventricular cavity is mildly dilated. Normal right ventricular systolic function noted.  •  The left atrial cavity is severely dilated.  •  The right atrial cavity is severely dilated.  •  Saline test results are negative.  •  Mild aortic valve regurgitation is present.  •  Mild mitral valve regurgitation is present.  •  Mild to moderate tricuspid valve regurgitation is present.  •  Calculated right ventricular systolic pressure from tricuspid regurgitation is 38 mmHg.  •  There is no evidence of pericardial effusion.          -----------------------------------------------------  RHC/LHC    No results found for this or any previous visit.      ---------------------------------------------------------------------------    CT:   CTA chest  FINDINGS:  1. There is no convincing evidence for pulmonary thromboemboli. Again  seen is heterogeneous admixture of contrast of the pulmonary arteries at  the right lower lobe, but no pulmonary thromboemboli are seen. Pulmonary  arteries are enlarged with the main pulmonary artery measuring 3.8 cm in  diameter, pulmonary arterial hypertension.     The thoracic aorta is ectatic at 4.0 cm, stable  when measured along the  same planes. The diameter tapers to 3 cm at the aortic arch.     2. Stable marked elevation of the right hemidiaphragm with significant  compressive atelectasis at the right lower lobe. No new airspace  consolidations are seen suspicious for pneumonia. There are no pleural  or pericardial effusions. There is cardiomegaly and findings of  right-sided cardiac insufficiency, unchanged.     3. Enlarged mediastinal nodes are stable and extensive multinodular  goiter appears stable, although not seen in its entirety. No new  abnormality seen at the visualized upper abdomen, multiple renal cysts  are noted.      --------------------------------------------------------------------------------------------------------------    Laboratory evaluations:  Renal Function: Estimated Creatinine Clearance: 28.6 mL/min (A) (by C-G formula based on SCr of 1.32 mg/dL (H)).    Lab Results   Component Value Date    GLUCOSE 98 06/20/2025    BUN 35 06/20/2025    CREATININE 1.32 (H) 06/20/2025    EGFRIFNONA 77 07/29/2021    EGFRIFAFRI 115 01/24/2022    BCR 27 06/20/2025    K 5.0 06/20/2025    CO2 26 06/20/2025    CALCIUM 11.1 (H) 06/20/2025    ALBUMIN 4.2 06/20/2025    AST 16 06/20/2025    ALT 5 06/20/2025     Lab Results   Component Value Date    WBC 2.8 (L) 06/20/2025    HGB 12.9 06/20/2025    HCT 39.9 06/20/2025     (H) 06/20/2025     (L) 06/20/2025     Lab Results   Component Value Date    HGBA1C 5.4 04/25/2022     Lab Results   Component Value Date    HGBA1C 5.4 04/25/2022    HGBA1C 4.90 06/23/2021    HGBA1C 5.30 05/11/2021     Lab Results   Component Value Date    CREATININE 1.32 (H) 06/20/2025     Lab Results   Component Value Date    IRON 106 02/25/2025    TIBC 338 02/25/2025    FERRITIN 122.00 02/25/2025       Result Review:  I have personally reviewed the results from the time of this admission to 6/30/2025 12:07 EDT and agree with these findings:  [x]  Laboratory list / accordion  []   Microbiology  [x]  Radiology  [x]  EKG/Telemetry   [x]  Cardiology/Vascular   []  Pathology  [x]  Old records  []  Other:          ReDS VOLUMETRIC ASSESSMENT:  UTO      Assessment & Plan     1. Pulmonary hypertension    2. Chronic heart failure with preserved ejection fraction    3. Dysfunction of right cardiac ventricle          Pulmonary hypertension-she has HFpEF and HUGO. we will focus on GDMT for heart failure.  PFTs showed Mild restrictive lung disease with a moderate diffusion deficit.   Patient will follow-up with Dr Win for PH management--has not been indicated to have PAH specific medications.     HFpEF.  4/2024 LVEF 61 to 65%  Blood pressure is low normal.  95/56.  She denies any dizziness or lightheadedness.  Labs reviewed from6/20/2025 show creatinine 1.32, GFR 38  Given the low blood pressure and slightly elevated creatinine may be slightly dehydrated, I will have her decrease her Lasix to 20 mg daily.  Encouraged patient to monitor weight and increase Lasix back to 40 mg daily if needed for weight gain.   Bilateral leg edema-she is following with lymphedema clinic.  Leg edema is overall improved.    3. Right ventricular dysfunction--would like to keep patient on spironolactone --stable on 12.5mg daily.         NYHA stage C FC-III     Clinical status was assessed and has remained stable.  Treatment intent: curative   ReDS reading was not obtained today.  ReDS result: UTO       Today, Patient appears euvolemic. and with perfusion. The patient's hemodynamics are currently acceptable. HR is: normal and is at goal. BP/MAP was reviewed and there is not room for medication up-titration.  The patient's clinical course has been impacted by Arrythmia-persistent afib, untreated HUGO. LVEF: 61-65%.     GDMT Assessment:     GDMT changes recommended today: Decrease diuretic.      BB:   Rate controlled without BB--possible future addition as patient has pacemaker if needed  Monitor heart rate.  Please call the  [facility] for HR<50, dizziness, lightheadedness, syncope     A/A/A:   Hemodynamics have not previously allow--recent issues with hyperkalemia.  Patient was taking extra potassium supplement     ADG9V-T:  Not a candidate due to having severe vaginal yeast infections.       MRA:  The patient is FC-NYHA Class III and MRA is indicated.   continue Spironolactone to 12.5mg   Quarterly GFR/electrolyte assessment          Diuretic Regimen:  -DIURETIC:   furosemide (LASIX) 40 mg daily ------decreased to 20mg daily  -Fluid restriction:   -requested  -2000 ml  -Patient has been asked to weigh daily and was provided with a printed diuretic strategy.  -Sodium restriction:   -1,500 mg Na restriction was discussed.          Lifestyle Advice:   - call office if I gain more than 2 pounds in one day or 5 pounds in one week   - keep legs up while sitting   - use salt in moderation   - watch for swelling in feet, ankles and legs every day   - weigh myself daily   -call for concerning s/sx   -- activity or exercise based on tolerance encouraged     CODE STATUS: FULL              Return in about 3 months (around 9/30/2025).              Thank you for allowing me to participate in the care of your patient,         SHAHID Bazan  [facility] FAIL[facility]  06/30/25  09:12 EDT      **Deon Disclaimer:**  Much of this encounter note is an electronic transcription/translation of spoken language to printed text. The electronic translation of spoken language may permit erroneous, or at times, nonsensical words or phrases to be inadvertently transcribed. Although I have reviewed the note for such errors, some may still exist.    The information in this note was reviewed and updated during the visit to be as accurate as possible. As many patients have chronic medical problems, many of their individual problems and ongoing plans do not change significantly from visit to visit.  This information is correct and is consistent with my treatment  plan as of today's visit.

## 2025-06-30 NOTE — PROGRESS NOTES
Subjective   The ABCs of the Annual Wellness Visit  Medicare Wellness Visit      Brittany Villeda is a 89 y.o. patient who presents for a Medicare Wellness Visit.    The following portions of the patient's history were reviewed and   updated as appropriate: allergies, current medications, past family history, past medical history, past social history, past surgical history, and problem list.    Compared to one year ago, the patient's physical   health is the same.  Compared to one year ago, the patient's mental   health is the same.    Recent Hospitalizations:  This patient has had a [facility] admission record on file within the last 365 days.  Current Medical Providers:  Patient Care Team:  Mega Esparza MD as PCP - General (Family Medicine)  Micaela English MD as Consulting Physician (Gastroenterology)  Mary Grace Culp MD as Consulting Physician (Cardiology)  Jp Krause Jr., MD as Consulting Physician (Hematology and Oncology)  Yasmeen Pichardo [facility] as Pharmacist  Micaela English MD as Referring Physician (Gastroenterology)  Devon Valadez MD as Consulting Physician (Hematology and Oncology)  Rusty Interiano, JamarD as Pharmacist (Pharmacy)  Catie Jules PA-C as Physician Assistant (Gastroenterology)  Antonio Foster MD as Surgeon (General Surgery)  Valeria Darling APRN as Nurse Practitioner (Obstetrics and Gynecology)  Mark Win MD PhD as Consulting Physician (Cardiology)  Mark Win MD PhD as Referring Physician (Cardiology)  Carmelo Coates MD as Consulting Physician (Hematology and Oncology)  Carmelo Coates MD as Consulting Physician (Hematology and Oncology)  Franklin Galvin MD as Referring Physician (Cardiology)  Venu Hebert RN as Ambulatory  (Population Health)    Outpatient Medications Prior to Visit   Medication Sig Dispense Refill    acetaminophen (TYLENOL) 500 MG tablet Take 1 tablet by mouth 2 (Two) Times a Day.       docusate sodium (COLACE) 100 MG capsule Take 1 capsule by mouth Daily.      ELDERBERRY PO Take 2 tablets by mouth Daily.      furosemide (LASIX) 40 MG tablet Take 1 tablet by mouth Daily. Taking 20mg      lidocaine (XYLOCAINE) 5 % ointment Apply  topically to the appropriate area as directed Daily As Needed for Mild Pain (for rectal discomfort). 50 g 5    Magnesium Oxide -Mg Supplement 400 (240 Mg) MG tablet TAKE 1 TABLET BY MOUTH DAILY 90 tablet 1    mesalamine (APRISO) 0.375 g 24 hr capsule TAKE FOUR CAPSULES BY MOUTH DAILY 360 capsule 2    mesalamine (CANASA) 1000 MG suppository Insert 1 suppository into the rectum At Night As Needed (pain/bleeding). 30 each 2    pantoprazole (PROTONIX) 40 MG EC tablet TAKE 1 TABLET BY MOUTH DAILY 90 tablet 1    spironolactone (ALDACTONE) 25 MG tablet Take 1 tablet by mouth Every Other Day. (Patient taking differently: Take 0.5 tablets by mouth Daily.) 45 tablet 3    tacrolimus (PROTOPIC) 0.1 % ointment       valACYclovir (VALTREX) 1000 MG tablet Take 1 tablet by mouth Daily.      vitamin B-12 (CYANOCOBALAMIN) 1000 MCG tablet TAKE 1 TABLET BY MOUTH DAILY 90 tablet 1     No facility-administered medications prior to visit.     No opioid medication identified on active medication list. I have reviewed chart for other potential  high risk medication/s and harmful drug interactions in the elderly.      Aspirin is not on active medication list.  Aspirin use is not indicated based on review of current medical condition/s. Risk of harm outweighs potential benefits.  .    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    Lichen sclerosus    Heart block    Sleep-related hypoxia    Bradycardia    History of atrial fibrillation    Pacemaker    Paroxysmal SVT (supraventricular tachycardia)    History of chest pain    Palpitations    Paroxysmal atrial fibrillation    Ascending aortic aneurysm    Mediastinal lymphadenopathy    Anemia    Obesity (BMI 30-39.9)    Generalized weakness    Dysautonomia    Hypercalcemia    Hyperparathyroidism    Choledocholithiasis    Duodenal ulcer    Hemorrhagic gastritis    Exertional dyspnea    COPD (chronic obstructive pulmonary disease)    Osteoarthritis of glenohumeral joint    ICH (intracerebral hemorrhage)    Lower GI bleed    Thrombocytopenia    Lichen sclerosus of female genitalia    Senile atrophic vaginitis    Left inguinal hernia    Vulvar pain    Bilateral lower extremity edema    Chronic heart failure with preserved ejection fraction    Acute renal insufficiency    Dysfunction of right cardiac ventricle    COVID-19 virus infection    Shortness of breath    Acute lower GI bleeding     Advance Care Planning Advance Directive is on file.  ACP discussion was held with the patient during this visit. Patient has an advance directive in EMR which is still valid.             Objective   Vitals:    06/30/25 1004   BP: 97/55   Pulse: 61   Temp: 97.5 °F (36.4 °C)   TempSrc: Temporal   SpO2: 100%   Weight: 77.6 kg (171 lb)   Height: 162.6 cm (64\")       Estimated body mass index is 29.35 kg/m² as calculated from the following:    Height as of this encounter: 162.6 cm (64\").    Weight as of this encounter: 77.6 kg (171 lb).    BMI is >= 25 and <30. (Overweight) The following options were offered after discussion;: none (medical contraindication)           Does the patient have evidence of cognitive impairment? No  Lab Results   Component Value Date    CHLPL 144 06/20/2025    TRIG 56 06/20/2025    HDL 65 06/20/2025    LDL 67 06/20/2025    VLDL 12 06/20/2025                              "                                                                   Health  Risk Assessment    Smoking Status:  Social History     Tobacco Use   Smoking Status Former    Current packs/day: 0.00    Average packs/day: 1.5 packs/day for 12.0 years (18.0 ttl pk-yrs)    Types: Cigarettes    Start date:     Quit date:     Years since quittin.5    Passive exposure: Past   Smokeless Tobacco Never   Tobacco Comments    QUIT SMOKING      Alcohol Consumption:  Social History     Substance and Sexual Activity   Alcohol Use No    Comment: caffeine - decaf coffee       Fall Risk Screen  STEADI Fall Risk Assessment was completed, and patient is at LOW risk for falls.Assessment completed on:2025    Depression Screening   Little interest or pleasure in doing things? Not at all   Feeling down, depressed, or hopeless? Not at all   PHQ-2 Total Score 0      Health Habits and Functional and Cognitive Screenin/30/2025    10:01 AM   Functional & Cognitive Status   Do you have difficulty preparing food and eating? Yes   Do you have difficulty bathing yourself, getting dressed or grooming yourself? No   Do you have difficulty using the toilet? No   Do you have difficulty moving around from place to place? No   Do you have trouble with steps or getting out of a bed or a chair? No   Current Diet Well Balanced Diet   Dental Exam Up to date   Eye Exam Up to date   Exercise (times per week) 2 times per week   Current Exercises Include Walking;No Regular Exercise   Do you need help using the phone?  No   Are you deaf or do you have serious difficulty hearing?  Yes   Do you need help to go to places out of walking distance? Yes   Do you need help shopping? Yes   Do you need help preparing meals?  Yes   Do you need help with housework?  Yes   Do you need help with laundry? Yes   Do you need help taking your medications? No   Do you need help managing money? No   Do you ever drive or ride in a car without wearing a seat  belt? No   Have you felt unusual fatigue (could be tiredness), stress, anger or loneliness in the last month? No   Who do you live with? Child   If you need help, do you have trouble finding someone available to you? No   Have you been bothered in the last four weeks by sexual problems? No   Do you have difficulty concentrating, remembering or making decisions? No           Age-appropriate Screening Schedule:  Refer to the list below for future screening recommendations based on patient's age, sex and/or medical conditions. Orders for these recommended tests are listed in the plan section. The patient has been provided with a written plan.    Health Maintenance List  Health Maintenance   Topic Date Due    RSV Vaccine - Adults (1 - 1-dose 75+ series) Never done    DXA SCAN  09/18/2021    TDAP/TD VACCINES (2 - Td or Tdap) 01/01/2022    COVID-19 Vaccine (1 - 2024-25 season) Never done    ANNUAL WELLNESS VISIT  04/22/2025    INFLUENZA VACCINE  07/01/2025    COLORECTAL CANCER SCREENING  10/28/2029    Pneumococcal Vaccine 50+  Completed    MAMMOGRAM  Discontinued    ZOSTER VACCINE  Discontinued                                                                                                                                                CMS Preventative Services Quick Reference  Risk Factors Identified During Encounter  Immunizations Discussed/Encouraged: RSV (Respiratory Syncytial Virus)    The above risks/problems have been discussed with the patient.  Pertinent information has been shared with the patient in the After Visit Summary.  An After Visit Summary and PPPS were made available to the patient.    Follow Up:   Next Medicare Wellness visit to be scheduled in 1 year.         Additional E&M Note during same encounter follows:  Patient has additional, significant, and separately identifiable condition(s)/problem(s) that require work above and beyond the Medicare Wellness Visit     Chief Complaint  Mass (Small mass   "back of right calf x 1 week /Went to urgent  care on 6/18) and Medicare Wellness-subsequent    Subjective   HPI          Patient was seen in the urgent care because of a lump on the back of the leg noticed when that she was getting fit for compression stockings.  She has history of chronic heart failure with preserved ejection fraction.  She is on furosemide daily and spironolactone 12.5 mg daily.  Her creatinine recently was 1.3 with normal potassium and sodium.  Her CBC was stable with the ongoing mild leukopenia and thrombocytopenia.  Her breathing has been good.  The swelling is much improved.  She went to the urgent care.  Venous Doppler negative for DVT.  Was put on cephalexin.  The redness improved.        Objective   Vital Signs:  BP 97/55   Pulse 61   Temp 97.5 °F (36.4 °C) (Temporal)   Ht 162.6 cm (64\")   Wt 77.6 kg (171 lb)   SpO2 100%   BMI 29.35 kg/m²   Physical Exam  Vitals and nursing note reviewed.   Constitutional:       General: She is not in acute distress.     Appearance: She is well-developed.   Cardiovascular:      Rate and Rhythm: Normal rate and regular rhythm.      Heart sounds: Normal heart sounds.   Pulmonary:      Effort: Pulmonary effort is normal.      Breath sounds: Normal breath sounds.   Musculoskeletal:      Right lower leg: No edema.      Left lower leg: No edema.      Comments: Posterior right Achilles tendon area, small subdermal cystic nodule nontender.  Suggestive of ganglion cyst off the Achilles synovial structure.  There is venous stasis.  No erythema.  No edema today.   Skin:     General: Skin is warm and dry.   Psychiatric:         Mood and Affect: Mood normal.         The following data was reviewed by: Mega Esparza MD on 06/30/2025:    Common labs          5/29/2025    10:50 6/2/2025    16:09 6/20/2025    10:05   Common Labs   Glucose 92  96  98    BUN 23.0  26.0  35    Creatinine 1.13  1.12  1.32    Sodium 136  138  138    Potassium 4.7  5.0  5.0    Chloride " 103  102  100    Calcium 10.4  11.1  11.1    Albumin   4.2    Total Bilirubin   1.5    Alkaline Phosphatase   73    AST (SGOT)   16    ALT (SGPT)   5    WBC   2.8    Hemoglobin   12.9    Hematocrit   39.9    Platelets   118    Total Cholesterol   144    Triglycerides   56    HDL Cholesterol   65    LDL Cholesterol    67      TSH          12/20/2024    11:41   TSH   TSH 0.346           Assessment and Plan     Annual Medicare wellness visit.    Immunizations.  Prevnar 21 and RSV at retail pharmacy.    Chronic diastolic congestive heart failure.  Continues furosemide daily, continues spironolactone 12.5 mg daily.  She has an appointment later today with the heart failure clinic.    Probable small ganglion cyst posterior right Achilles tendon area.  Observation order.    Breast cancer screening.  Patient declined further breast cancer screening.    I will see her back in 6 months.  Sooner as needed.     Medicare annual wellness visit, subsequent         Chronic diastolic congestive heart failure               Ulcerative rectosigmoiditis without complication                 Follow Up   No follow-ups on file.  Patient was given instructions and counseling regarding her condition or for health maintenance advice. Please see specific information pulled into the AVS if appropriate.

## 2025-06-30 NOTE — PROGRESS NOTES
[facility]    Franklin Galvin MD  [address]  [address]  [place] [address]    Thank you for asking me to see Brittany Villeda.     Congestive Heart Failure  Pertinent negatives include no chest pain or shortness of breath.       1.  Pulmonary hypertension  2.  HFpEF    Subjective      Brittany Damian a 89 y.o. female who presents today for pulmonary hypertension, HFpEF.   The patient's most recent 2D TTE showed evidence of pulmonary hypertension and right-sided involvement.  She does have biatrial dilatation.  She was diagnosed with HUGO. She does not use a CPAP. She was diagnosed with complete heart block in the past and required placement of a PPM--Status post [employer] dual-chamber pacemaker in 2018 .  She also was diagnosed with COPD.  She does not routinely follow with pulmonary medicine.  She also was diagnosed with MGUS.      Since last visit patient had admission in December 20, 2024 with COVID-19.  She was treated with Paxlovid and was able to be discharged.    Patient had hospitalization 1/18/2025 to 1/20/2025.  Acute decompensated Heart falure Hospitalization.  She received IV Lasix for diuresis with improvement.  She was discharged on home medications-Lasix 20 mg daily, spironolactone 12.5 mg daily. Feels like this was due to not having medicaitons.     Stable exercise intolerance.  Denies any abdominal distention.    She denies any new or worsening exertional dyspnea/shortness of breath  At last visit due to increasing leg edema and weight her diuretic was increased.  Denies any PND.   Reports no change in leg edema, did lose 2 pounds since last visit.      Review of Systems - Review of Systems   Constitutional: Positive for weight gain. Negative for weight loss.   Cardiovascular:  Positive for leg swelling. Negative for chest pain, dyspnea on exertion, orthopnea, paroxysmal nocturnal dyspnea and syncope.   Respiratory:  Negative for  cough, shortness of breath, sleep disturbances due to breathing and snoring.    Gastrointestinal:  Negative for bloating.   Neurological:  Negative for dizziness, light-headedness and weakness.          Patient Active Problem List   Diagnosis    Non-toxic multinodular goiter    Chronic midline low back pain with right-sided sciatica    Essential hypertension    Gastroesophageal reflux disease without esophagitis    Cataract    Pulmonary hypertension    Diastolic dysfunction    HUGO (obstructive sleep apnea)    Trifascicular block    MGUS (monoclonal gammopathy of unknown significance)    Ulcerative rectosigmoiditis without complication    Lichen sclerosus    Heart block    Sleep-related hypoxia    Bradycardia    History of atrial fibrillation    Pacemaker    Paroxysmal SVT (supraventricular tachycardia)    History of chest pain    Palpitations    Paroxysmal atrial fibrillation    Ascending aortic aneurysm    Mediastinal lymphadenopathy    Anemia    Obesity (BMI 30-39.9)    Generalized weakness    Dysautonomia    Hypercalcemia    Hyperparathyroidism    Choledocholithiasis    Duodenal ulcer    Hemorrhagic gastritis    Exertional dyspnea    COPD (chronic obstructive pulmonary disease)    Osteoarthritis of glenohumeral joint    ICH (intracerebral hemorrhage)    Lower GI bleed    Thrombocytopenia    Lichen sclerosus of female genitalia    Senile atrophic vaginitis    Left inguinal hernia    Vulvar pain    Bilateral lower extremity edema    Chronic heart failure with preserved ejection fraction    Acute renal insufficiency    Dysfunction of right cardiac ventricle    COVID-19 virus infection    Shortness of breath    Acute lower GI bleeding     family history includes Breast cancer in her sister; Diabetes in her mother; Heart disease in her sister; Kidney cancer in her sister; Prostate cancer in her brother, brother, and brother.   reports that she quit smoking about 60 years ago. Her smoking use included cigarettes. She  started smoking about 72 years ago. She has a 18 pack-year smoking history. She has been exposed to tobacco smoke. She has never used smokeless tobacco. She reports that she does not drink alcohol and does not use drugs.  Allergies   Allergen Reactions    Codeine Hallucinations     Tolerates hydromorphone    Amitriptyline Rash    Amoxicillin-Pot Clavulanate Rash    Aspirin Unknown - Low Severity     Patient doesn't know why    Carisoprodol-Aspirin-Codeine Palpitations    Iodinated Contrast Media Rash    Latex Rash    Naproxen Rash    Nsaids Unknown - Low Severity     UNSURE OF REACTION    Soma Compound With Codeine [Carisoprodol-Aspirin-Codeine] Rash    Sulfa Antibiotics Rash    Tramadol Palpitations     heart races      Physical Activity: Sufficiently Active (4/26/2024)    Exercise Vital Sign     Days of Exercise per Week: 5 days     Minutes of Exercise per Session: 30 min          Current Outpatient Medications:     acetaminophen (TYLENOL) 500 MG tablet, Take 1 tablet by mouth 2 (Two) Times a Day., Disp: , Rfl:     docusate sodium (COLACE) 100 MG capsule, Take 1 capsule by mouth Daily., Disp: , Rfl:     ELDERBERRY PO, Take 2 tablets by mouth Daily., Disp: , Rfl:     furosemide (LASIX) 40 MG tablet, Take 0.5 tablets by mouth Daily. Taking 20mg, Disp: 90 tablet, Rfl: 3    lidocaine (XYLOCAINE) 5 % ointment, Apply  topically to the appropriate area as directed Daily As Needed for Mild Pain (for rectal discomfort)., Disp: 50 g, Rfl: 5    Magnesium Oxide -Mg Supplement 400 (240 Mg) MG tablet, TAKE 1 TABLET BY MOUTH DAILY, Disp: 90 tablet, Rfl: 1    mesalamine (APRISO) 0.375 g 24 hr capsule, TAKE FOUR CAPSULES BY MOUTH DAILY, Disp: 360 capsule, Rfl: 2    mesalamine (CANASA) 1000 MG suppository, Insert 1 suppository into the rectum At Night As Needed (pain/bleeding)., Disp: 30 each, Rfl: 2    pantoprazole (PROTONIX) 40 MG EC tablet, TAKE 1 TABLET BY MOUTH DAILY, Disp: 90 tablet, Rfl: 1    spironolactone (ALDACTONE) 25 MG  tablet, Take 0.5 tablets by mouth Daily., Disp: , Rfl:     tacrolimus (PROTOPIC) 0.1 % ointment, , Disp: , Rfl:     valACYclovir (VALTREX) 1000 MG tablet, Take 1 tablet by mouth Daily., Disp: , Rfl:     vitamin B-12 (CYANOCOBALAMIN) 1000 MCG tablet, TAKE 1 TABLET BY MOUTH DAILY, Disp: 90 tablet, Rfl: 1    Objective   Vital Sign Review:   Vitals:    06/30/25 1114   BP: 95/59   Pulse: 67   SpO2: 99%       Body mass index is 29.34 kg/m².      06/30/25  1114   Weight: 77.6 kg (171 lb)         Physical Exam:  Constitutional:       Appearance: Normal and healthy appearance.   Neck:      Vascular: No carotid bruit or JVD. JVD normal.   Pulmonary:      Effort: Pulmonary effort is normal.      Breath sounds: Normal breath sounds.   Cardiovascular:      PMI at left midclavicular line. Normal rate. Regular rhythm.   Pulses:     Intact distal pulses.   Edema:     Peripheral edema present.     Pretibial: bilateral 2+ edema of the pretibial area.     Ankle: bilateral 2+ edema of the ankle.  Abdominal:      Palpations: Abdomen is soft.      Tenderness: There is no abdominal tenderness.   Skin:     General: Skin is warm and dry.   Neurological:      General: No focal deficit present.      Mental Status: Alert and oriented to person, place and time.   Psychiatric:         Behavior: Behavior is cooperative.          DATA REVIEWED:   EKG:  ECG 12 Lead     Date/Time: 8/12/2024 1:25 PM  Performed by: Sydney Gilliam APRN     Authorized by: Sydney Gilliam APRN  Comparison: compared with previous ECG   Similar to previous ECG  Rhythm: atrial fibrillation and paced  Rate: normal  BPM: 67  Conduction: right bundle branch block  QRS axis: normal  Comments: Similar to prior    ---------------------------------------------------  ECHO:    Results for orders placed during the hospital encounter of 04/23/24    Adult Transthoracic Echo Complete W/ Cont if Necessary Per Protocol    Interpretation Summary    Left ventricular systolic  function is normal. Left ventricular ejection fraction appears to be 61 - 65%.    Left ventricular diastolic function was indeterminate.    The right ventricular cavity is mildly dilated. Normal right ventricular systolic function noted.    The left atrial cavity is severely dilated.    The right atrial cavity is severely dilated.    Saline test results are negative.    Mild aortic valve regurgitation is present.    Mild mitral valve regurgitation is present.    Mild to moderate tricuspid valve regurgitation is present.    Calculated right ventricular systolic pressure from tricuspid regurgitation is 38 mmHg.    There is no evidence of pericardial effusion.          -----------------------------------------------------  RHC/LHC    No results found for this or any previous visit.      ---------------------------------------------------------------------------    CT:   CTA chest  FINDINGS:  1. There is no convincing evidence for pulmonary thromboemboli. Again  seen is heterogeneous admixture of contrast of the pulmonary arteries at  the right lower lobe, but no pulmonary thromboemboli are seen. Pulmonary  arteries are enlarged with the main pulmonary artery measuring 3.8 cm in  diameter, pulmonary arterial hypertension.     The thoracic aorta is ectatic at 4.0 cm, stable when measured along the  same planes. The diameter tapers to 3 cm at the aortic arch.     2. Stable marked elevation of the right hemidiaphragm with significant  compressive atelectasis at the right lower lobe. No new airspace  consolidations are seen suspicious for pneumonia. There are no pleural  or pericardial effusions. There is cardiomegaly and findings of  right-sided cardiac insufficiency, unchanged.     3. Enlarged mediastinal nodes are stable and extensive multinodular  goiter appears stable, although not seen in its entirety. No new  abnormality seen at the visualized upper abdomen, multiple renal cysts  are  noted.      --------------------------------------------------------------------------------------------------------------    Laboratory evaluations:  Renal Function: Estimated Creatinine Clearance: 28.6 mL/min (A) (by C-G formula based on SCr of 1.32 mg/dL (H)).    Lab Results   Component Value Date    GLUCOSE 98 06/20/2025    BUN 35 06/20/2025    CREATININE 1.32 (H) 06/20/2025    EGFRIFNONA 77 07/29/2021    EGFRIFAFRI 115 01/24/2022    BCR 27 06/20/2025    K 5.0 06/20/2025    CO2 26 06/20/2025    CALCIUM 11.1 (H) 06/20/2025    ALBUMIN 4.2 06/20/2025    AST 16 06/20/2025    ALT 5 06/20/2025     Lab Results   Component Value Date    WBC 2.8 (L) 06/20/2025    HGB 12.9 06/20/2025    HCT 39.9 06/20/2025     (H) 06/20/2025     (L) 06/20/2025     Lab Results   Component Value Date    HGBA1C 5.4 04/25/2022     Lab Results   Component Value Date    HGBA1C 5.4 04/25/2022    HGBA1C 4.90 06/23/2021    HGBA1C 5.30 05/11/2021     Lab Results   Component Value Date    CREATININE 1.32 (H) 06/20/2025     Lab Results   Component Value Date    IRON 106 02/25/2025    TIBC 338 02/25/2025    FERRITIN 122.00 02/25/2025       Result Review:  I have personally reviewed the results from the time of this admission to 6/30/2025 12:07 EDT and agree with these findings:  [x]  Laboratory list / accordion  []  Microbiology  [x]  Radiology  [x]  EKG/Telemetry   [x]  Cardiology/Vascular   []  Pathology  [x]  Old records  []  Other:          ReDS VOLUMETRIC ASSESSMENT:  UTO      Assessment & Plan      1. Pulmonary hypertension    2. Chronic heart failure with preserved ejection fraction    3. Dysfunction of right cardiac ventricle          Pulmonary hypertension-she has HFpEF and HUGO. we will focus on GDMT for heart failure.  PFTs showed Mild restrictive lung disease with a moderate diffusion deficit.   Patient will follow-up with Dr Win for PH management--has not been indicated to have PAH specific medications.     HFpEF.   4/2024 LVEF 61 to 65%  Blood pressure is low normal.  95/56.  She denies any dizziness or lightheadedness.  Labs reviewed from6/20/2025 show creatinine 1.32, GFR 38  Given the low blood pressure and slightly elevated creatinine may be slightly dehydrated, I will have her decrease her Lasix to 20 mg daily.  Encouraged patient to monitor weight and increase Lasix back to 40 mg daily if needed for weight gain.   Bilateral leg edema-she is following with lymphedema clinic.  Leg edema is overall improved.    3. Right ventricular dysfunction--would like to keep patient on spironolactone --stable on 12.5mg daily.         NYHA stage C FC-III     Clinical status was assessed and has remained stable.  Treatment intent: curative   ReDS reading was not obtained today.  ReDS result: UTO       Today, Patient appears euvolemic. and with perfusion. The patient's hemodynamics are currently acceptable. HR is: normal and is at goal. BP/MAP was reviewed and there is not room for medication up-titration.  The patient's clinical course has been impacted by Arrythmia-persistent afib, untreated HUGO. LVEF: 61-65%.     GDMT Assessment:     GDMT changes recommended today: Decrease diuretic.      BB:   Rate controlled without BB--possible future addition as patient has pacemaker if needed  Monitor heart rate.  Please call the [facility] for HR<50, dizziness, lightheadedness, syncope     A/A/A:   Hemodynamics have not previously allow--recent issues with hyperkalemia.  Patient was taking extra potassium supplement     [id number]:  Not a candidate due to having severe vaginal yeast infections.       MRA:  The patient is FC-NYHA Class III and MRA is indicated.   continue Spironolactone to 12.5mg   Quarterly GFR/electrolyte assessment          Diuretic Regimen:  -DIURETIC:   furosemide (LASIX) 40 mg daily ------decreased to 20mg daily  -Fluid restriction:   -requested  -2000 ml  -Patient has been asked to weigh daily and was provided with a printed diuretic  strategy.  -Sodium restriction:   -1,500 mg Na restriction was discussed.          Lifestyle Advice:   - call office if I gain more than 2 pounds in one day or 5 pounds in one week   - keep legs up while sitting   - use salt in moderation   - watch for swelling in feet, ankles and legs every day   - weigh myself daily   -call for concerning s/sx   -- activity or exercise based on tolerance encouraged     CODE STATUS: FULL              Return in about 3 months (around 9/30/2025).              Thank you for allowing me to participate in the care of your patient,         SHAHID Bazan  [facility] HEART FAILURE  06/30/25  09:12 EDT      **Deon Disclaimer:**  Much of this encounter note is an electronic transcription/translation of spoken language to printed text. The electronic translation of spoken language may permit erroneous, or at times, nonsensical words or phrases to be inadvertently transcribed. Although I have reviewed the note for such errors, some may still exist.    The information in this note was reviewed and updated during the visit to be as accurate as possible. As many patients have chronic medical problems, many of their individual problems and ongoing plans do not change significantly from visit to visit.  This information is correct and is consistent with my treatment plan as of today's visit.

## 2025-07-03 ENCOUNTER — TELEPHONE (OUTPATIENT)
Dept: CARDIOLOGY | Facility: CLINIC | Age: OVER 89
End: 2025-07-03
Payer: MEDICARE

## 2025-07-03 NOTE — TELEPHONE ENCOUNTER
7/3/2025 1322  Patient has tolerated decreased dose of Lasix.  Denies any new or worsening shortness of breath or edema.  Blood pressure is better, yesterday was 106/69  Will continue decreased dose of Lasix 20 mg daily      ----- Message from Brooke Clay sent at 6/30/2025 11:47 AM EDT -----  Call to evaluate decreased lasix to 20mg daily

## 2025-07-06 NOTE — CASE MANAGEMENT/SOCIAL WORK
Discharge Planning Assessment  [facility]     Patient Name: Brittany Villeda  MRN: [id number]  Today's Date: 4/24/2024    Admit Date: 4/23/2024    Plan: Plans to return home at d/c, w/ HH PT if ordered-BHUMI Piper RN   Discharge Needs Assessment       Row Name 04/24/24 1445       Living Environment    People in Home child(donaldo), adult    Name(s) of People in Home Tushar    Current Living Arrangements home    Potentially Unsafe Housing Conditions none    In the past 12 months has the electric, gas, oil, or water company threatened to shut off services in your home? No    Primary Care Provided by self    Provides Primary Care For no one    Family Caregiver if Needed child(donaldo), adult    Family Caregiver Names son Tushar; daughters Amina, Gema, Kiana and Megan    Quality of Family Relationships supportive    Able to Return to Prior Arrangements yes       Resource/Environmental Concerns    Resource/Environmental Concerns none    Transportation Concerns none       Transportation Needs    In the past 12 months, has lack of transportation kept you from medical appointments or from getting medications? no    In the past 12 months, has lack of transportation kept you from meetings, work, or from getting things needed for daily living? No       Food Insecurity    Within the past 12 months, you worried that your food would run out before you got the money to buy more. Never true    Within the past 12 months, the food you bought just didn't last and you didn't have money to get more. Never true       Transition Planning    Patient/Family Anticipates Transition to home with family    Patient/Family Anticipated Services at Transition other (see comments)  ? HH PT    Transportation Anticipated family or friend will provide       Discharge Needs Assessment    Equipment Currently Used at Home rollator;walker, rolling;cane, straight;cane, quad;shower chair    Concerns to be Addressed other (see comments)  TBD     Anticipated Changes Related to Illness none    Equipment Needed After Discharge none    Provided Post Acute Provider List? N/A    Provided Post Acute Provider Quality & Resource List? N/A                   Discharge Plan       Row Name 04/24/24 6408       Plan    Plan Plans to return home at d/c, w/ HH PT if ordered-BHUMI Piper RN    Patient/Family in Agreement with Plan yes    Provided Post Acute Provider List? N/A    Provided Post Acute Provider Quality & Resource List? N/A    Plan Comments Spoke w/ pt, and daughters Gema and Kiana at bedside w/ patient's permission. All info on facesheet, incl PCP as MARYA Esparza, verified. Lives w/ son Tushar in single story home w/ a ramp, and is able to navigate around home w/o difficulty. Independent w/ ADLs. Son and several daughters provide assist as needed; per daughter Gema, pt is never alone. Uses shower chair, rolling walker, rollator, straight cane, quad cane at home. denies need for any other DME. Spoke w/ them re ?possible HH PT as suggested by PT; they are agreeable; has had HH in the past, and herbie Ribeiro will check to see what company was used and will let CM know. Uses [facility] at [facility] and [email or web address] , and is able to obtain and pay for meds. To return home at d/c, w/ familys' assist and ?HH; family will transport; agreeable w/ plan. CM will continue to follow-BHUMI Piper RN                  Continued Care and Services - Admitted Since 4/23/2024    No active coordination exists for this encounter.          Demographic Summary       Row Name 04/24/24 6578       General Information    Admission Type observation    Arrived From emergency department    Required Notices Provided Observation Status Notice    Referral Source admission list    Reason for Consult discharge planning    Preferred Language English       Contact Information    Permission Granted to Share Info With                    Functional Status       Row Name 04/24/24 4203        Functional Status    Usual Activity Tolerance fair    Current Activity Tolerance fair       Functional Status, IADL    Medications independent    IADL Comments family assists w/ other tasks listed above as needed       Mental Status    General Appearance WDL WDL       Mental Status Summary    Recent Changes in Mental Status/Cognitive Functioning no changes                   Psychosocial       Row Name 04/24/24 1444       Behavior WDL    Behavior WDL WDL       Emotion Mood WDL    Emotion/Mood/Affect WDL WDL       Speech WDL    Speech WDL WDL       Perceptual State WDL    Perceptual State WDL WDL       Thought Process WDL    Thought Process WDL WDL       Intellectual Performance WDL    Intellectual Performance WDL WDL                   Abuse/Neglect    No documentation.                  Legal    No documentation.                  Substance Abuse    No documentation.                  Patient Forms    No documentation.                     Renetta Piper RN     - - -

## 2025-07-17 ENCOUNTER — TELEPHONE (OUTPATIENT)
Dept: OBSTETRICS AND GYNECOLOGY | Age: OVER 89
End: 2025-07-17

## 2025-07-17 ENCOUNTER — HOSPITAL ENCOUNTER (OUTPATIENT)
Facility: HOSPITAL | Age: OVER 89
Discharge: HOME | End: 2025-07-17
Payer: MEDICARE

## 2025-07-22 ENCOUNTER — OFFICE VISIT (OUTPATIENT)
Dept: GASTROENTEROLOGY | Facility: CLINIC | Age: OVER 89
End: 2025-07-22
Payer: MEDICARE

## 2025-07-22 VITALS
TEMPERATURE: 97.7 F | SYSTOLIC BLOOD PRESSURE: 107 MMHG | BODY MASS INDEX: 29.5 KG/M2 | HEIGHT: 64 IN | DIASTOLIC BLOOD PRESSURE: 53 MMHG | HEART RATE: 68 BPM | WEIGHT: 172.8 LBS

## 2025-07-22 DIAGNOSIS — K51.30 ULCERATIVE RECTOSIGMOIDITIS WITHOUT COMPLICATION: Primary | ICD-10-CM

## 2025-07-22 PROCEDURE — 99213 OFFICE O/P EST LOW 20 MIN: CPT

## 2025-07-22 PROCEDURE — 1160F RVW MEDS BY RX/DR IN RCRD: CPT

## 2025-07-22 PROCEDURE — 1159F MED LIST DOCD IN RCRD: CPT

## 2025-07-22 RX ORDER — CYCLOSPORINE 0.5 MG/ML
EMULSION OPHTHALMIC
COMMUNITY
Start: 2025-07-15

## 2025-07-22 RX ORDER — LIDOCAINE 50 MG/G
OINTMENT TOPICAL DAILY PRN
Qty: 50 G | Refills: 5 | Status: SHIPPED | OUTPATIENT
Start: 2025-07-22

## 2025-07-22 NOTE — PROGRESS NOTES
"Chief Complaint  Ulcerative rectosigmoiditis without complication    Subjective          History of Present Illness    Brittany Villeda is a  89 y.o. female presents for follow-up.  She is a patient of Dr. English was last seen by me 4/21/2025.  She has a history of UC, pulmonary hypertension, CKD 3, CHF, A-fib and has had her gallbladder removed.    She had issues with rectal bleeding in the fall 2022. She underwent a flexible sigmoidoscopy at that time which did show some mild ulcerative proctosigmoiditis and she was treated with prednisone. She is managed chronically with Apriso 1.5 g daily.     Reports she is doing well on Apriso.  No urgency or black or bloody stool.  She does occasionally get some discomfort around her perineum but this is controlled with lidocaine as needed.  She continues to use Canasa suppositories as needed as well.  No abdominal pain.  Good appetite.  Weight has been stable.    CT abdomen pelvis without contrast showed slight asymmetric soft tissue thickening extending inferiorly from the left aspect of the anus through the subcutaneous tissues similar to scan from 9/20/2024.     Objective   Vital Signs:   /53 (BP Location: Left arm, Patient Position: Sitting, Cuff Size: Large Adult)   Pulse 68   Temp 97.7 °F (36.5 °C) (Temporal)   Ht 162.6 cm (64.02\")   Wt 78.4 kg (172 lb 12.8 oz)   BMI 29.64 kg/m²       Physical Exam  Constitutional:       General: She is not in acute distress.     Appearance: Normal appearance.   Eyes:      General: No scleral icterus.  Pulmonary:      Effort: Pulmonary effort is normal.   Abdominal:      General: Abdomen is flat. There is no distension.      Tenderness: There is no abdominal tenderness. There is no guarding.   Skin:     Coloration: Skin is not jaundiced.   Neurological:      General: No focal deficit present.      Mental Status: She is alert and oriented to person, place, and time.   Psychiatric:         Mood and Affect: Mood normal.    "      Behavior: Behavior normal.          Result Review :   The following data was reviewed by: Ana Cristina Zafar PA-C on 07/22/2025:  CMP          5/29/2025    10:50 6/2/2025    16:09 6/20/2025    10:05   CMP   Glucose 92  96  98    BUN 23.0  26.0  35    Creatinine 1.13  1.12  1.32    EGFR 46.3  46.8  38    Sodium 136  138  138    Potassium 4.7  5.0  5.0    Chloride 103  102  100    Calcium 10.4  11.1  11.1    Total Protein   7.6    Albumin   4.2    Globulin   3.4    Total Bilirubin   1.5    Alkaline Phosphatase   73    AST (SGOT)   16    ALT (SGPT)   5    BUN/Creatinine Ratio 20.4  23.2  27    Anion Gap 8.8  9.0       CBC          2/11/2025    05:33 2/25/2025    09:23 6/20/2025    10:05   CBC   WBC 2.36  3.33  2.8    RBC 3.10  3.47  3.61    Hemoglobin 11.2  12.6  12.9    Hematocrit 32.3  36.0  39.9    .2  103.7  111    MCH 36.1  36.3  35.7    MCHC 34.7  35.0  32.3    RDW 11.7  12.7  12.0    Platelets 99  132  118              Assessment:   Diagnoses and all orders for this visit:    1. 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Primary)  -     Calprotectin, Fecal - Stool, Per Rectum  -     lidocaine (XYLOCAINE) 5 % ointment; Apply  topically to the appropriate area as directed Daily As Needed for Mild Pain (for rectal discomfort).  Dispense: 50 g; Refill: 5          Plan:   - Continue Apriso daily  -Continue Canasa suppositories  -Okay to use lidocaine as needed for intermittent rectal discomfort.  -Check fecal calprotectin.      Follow Up   Return in about 6 months (around 1/22/2026).    Dragon dictation used throughout this note.         Ana Cristina Zafar PA-C  [facility]  [address] [place] [address]  Office: [phone]

## 2025-07-30 LAB — CALPROTECTIN STL-MCNT: 275 UG/G (ref 0–120)

## (undated) DEVICE — ELECTRD ECG CARBON/SNP RL FM A/ 5PK

## (undated) DEVICE — GLV SURG SENSICARE PI LF PF 7.5 GRN STRL

## (undated) DEVICE — TOOL MR8-9AC60M MR8 9CM ACORN 6MM 2FLT: Brand: MIDAS REX MR8

## (undated) DEVICE — SKIN AFFIX SURG ADHESIVE 72/CS 0.55ML: Brand: MEDLINE

## (undated) DEVICE — SENSR O2 OXIMAX FNGR A/ 18IN NONSTR

## (undated) DEVICE — TUBING, SUCTION, 1/4" X 10', STRAIGHT: Brand: MEDLINE

## (undated) DEVICE — CONN TBG Y 5 IN 1 LF STRL

## (undated) DEVICE — SMOKE EVACUATION TUBING WITH 7/8 IN TO 1/4 IN REDUCER: Brand: BUFFALO FILTER

## (undated) DEVICE — MAYFIELD® DISPOSABLE ADULT SKULL PIN (PLASTIC BASE): Brand: MAYFIELD®

## (undated) DEVICE — ADHS SKIN SURG TISS VISC PREMIERPRO EXOFIN HI/VISC FAST/DRY

## (undated) DEVICE — THE TORRENT IRRIGATION SCOPE CONNECTOR IS USED WITH THE TORRENT IRRIGATION TUBING TO PROVIDE IRRIGATION FLUIDS SUCH AS STERILE WATER DURING GASTROINTESTINAL ENDOSCOPIC PROCEDURES WHEN USED IN CONJUNCTION WITH AN IRRIGATION PUMP (OR ELECTROSURGICAL UNIT).: Brand: TORRENT

## (undated) DEVICE — LN SMPL CO2 SHTRM SD STREAM W/M LUER

## (undated) DEVICE — SUT ETHIB 0/0 MO6 I8IN CX45D

## (undated) DEVICE — BITEBLOCK OMNI BLOC

## (undated) DEVICE — KT VLV BIOGUARD SXN BIOP AIR/H20 CONN 4PC DISP

## (undated) DEVICE — TRAP FLD MINIVAC MEGADYNE 100ML

## (undated) DEVICE — GLV SURG BIOGEL LTX PF 8

## (undated) DEVICE — SOL ISO/ALC RUB 70PCT 4OZ

## (undated) DEVICE — GLV SURG SENSICARE PI PF LF 8.5 GRN STRL

## (undated) DEVICE — TOOL MR8-F2/7TA23 MR8 F2/7CM TAPER 2.3MM: Brand: MIDAS REX MR8

## (undated) DEVICE — SUT VIC 5/0 PS2 18IN J495H

## (undated) DEVICE — SUT VIC 3/0 SH 27IN J416H

## (undated) DEVICE — SPNG GZ WOVN 4X4IN 12PLY 10/BX STRL

## (undated) DEVICE — DISPOSABLE BIPOLAR CABLE 12FT. (3.6M): Brand: KIRWAN

## (undated) DEVICE — GLV SURG PREMIERPRO ORTHO LTX PF SZ7.5 BRN

## (undated) DEVICE — Device

## (undated) DEVICE — SUT VIC 3/0 TIES 18IN J110T

## (undated) DEVICE — SUT MNCRYL PLS ANTIB UD 4/0 PS2 18IN

## (undated) DEVICE — STPLR SKIN VISISTAT WD 35CT

## (undated) DEVICE — MSK ENDO PORT O2 POM ELITE CURAPLEX A/

## (undated) DEVICE — SOL IRR NACL 0.9PCT BT 1000ML

## (undated) DEVICE — TUBING, SUCTION, 1/4" X 20', STRAIGHT: Brand: MEDLINE INDUSTRIES, INC.

## (undated) DEVICE — INTRO SHEATH PRELUDE SNAP .038 6F 13CM W/SDPRT

## (undated) DEVICE — PATIENT RETURN ELECTRODE, SINGLE-USE, CONTACT QUALITY MONITORING, ADULT, WITH 9FT CORD, FOR PATIENTS WEIGING OVER 33LBS. (15KG): Brand: MEGADYNE

## (undated) DEVICE — CANN O2 ETCO2 FITS ALL CONN CO2 SMPL A/ 7IN DISP LF

## (undated) DEVICE — PK CRANI 40

## (undated) DEVICE — DRP SLUSH WARMR MACH CIR 44X44IN

## (undated) DEVICE — SINGLE-USE BIOPSY FORCEPS: Brand: RADIAL JAW 4

## (undated) DEVICE — ADAPT CLN BIOGUARD AIR/H2O DISP

## (undated) DEVICE — KT ORCA ORCAPOD DISP STRL

## (undated) DEVICE — CANN NASL CO2 TRULINK W/O2 A/

## (undated) DEVICE — LIMB HOLDER, WRIST/ANKLE: Brand: DEROYAL

## (undated) DEVICE — SINGLE-USE POLYPECTOMY SNARE: Brand: CAPTIVATOR II

## (undated) DEVICE — SOL NS 500ML

## (undated) DEVICE — FRCP BX RADJAW4 NDL 2.8 240CM LG OG BX40

## (undated) DEVICE — SUT NUROLON 4/0 TF18 CR8 I8IN C584D

## (undated) DEVICE — PENCL ES MEGADINE EZ/CLEAN BUTN W/HOLSTR 10FT

## (undated) DEVICE — GLV SURG BIOGEL LTX PF 7 1/2

## (undated) DEVICE — LOU PACE DEFIB: Brand: MEDLINE INDUSTRIES, INC.

## (undated) DEVICE — SKIN PREP TRAY W/CHG: Brand: MEDLINE INDUSTRIES, INC.

## (undated) DEVICE — ST INF PRI SMRTSTE 20DRP 2VLV 24ML 117

## (undated) DEVICE — ELECTRD BLD EZ CLN MOD XLNG 2.75IN

## (undated) DEVICE — 1010 S-DRAPE TOWEL DRAPE 10/BX: Brand: STERI-DRAPE™

## (undated) DEVICE — THE SINGLE USE ETRAP – POLYP TRAP IS USED FOR SUCTION RETRIEVAL OF ENDOSCOPICALLY REMOVED POLYPS.: Brand: ETRAP

## (undated) DEVICE — TBG PENCL TELESCP MEGADYNE SMOKE EVAC 10FT

## (undated) DEVICE — PK PROC MINOR TOWER 40

## (undated) DEVICE — ANTIBACTERIAL UNDYED BRAIDED (POLYGLACTIN 910), SYNTHETIC ABSORBABLE SUTURE: Brand: COATED VICRYL

## (undated) DEVICE — CATH CSF EXT EDV VENTRICLEAR TRANSL 35CM

## (undated) DEVICE — PENROSE DRAIN 18" X 5/8": Brand: CARDINAL HEALTH

## (undated) DEVICE — AIRLIFE™ NASAL OXYGEN CANNULA CURVED, NONFLARED TIP WITH 21 FOOT (6.4 M) CRUSH-RESISTANT TUBING, OVER-THE-EAR STYLE: Brand: AIRLIFE™